# Patient Record
Sex: MALE | Race: WHITE | NOT HISPANIC OR LATINO | Employment: OTHER | URBAN - METROPOLITAN AREA
[De-identification: names, ages, dates, MRNs, and addresses within clinical notes are randomized per-mention and may not be internally consistent; named-entity substitution may affect disease eponyms.]

---

## 2017-01-02 ENCOUNTER — TELEPHONE (OUTPATIENT)
Dept: FAMILY MEDICINE | Facility: CLINIC | Age: 82
End: 2017-01-02

## 2017-01-02 NOTE — TELEPHONE ENCOUNTER
Fax from [facility][id number] requesting     Furosemide 20mg    See TE 11/8/16 - patient was told by dermatology to stop furosemide due to rash.  Medication now on allergy list.    Needs triage - did patient start taking medication again?  Or was this auto-fax from pharmacy?  Perhaps pharmacy needs to be notified that medication was discontinued?    LOV 11-25-16 Dena, 10-7-16 Mely    BP Readings from Last 6 Encounters:   11/25/16 122/66   11/22/16 116/70   10/07/16 159/73   10/05/16 138/70   09/28/16 130/62   09/02/16 128/74       Tran Cortes RT (R)

## 2017-01-02 NOTE — TELEPHONE ENCOUNTER
Called patient's [facility] .    Confirmed RX Furosemide was an automated refill request.    Informed pharmacy that the med has been DC'd and to add to patient's Allergy list.     Evie Mckeon RN, BSN

## 2017-01-04 ENCOUNTER — ANTICOAGULATION THERAPY VISIT (OUTPATIENT)
Dept: NURSING | Facility: CLINIC | Age: 82
End: 2017-01-04
Payer: COMMERCIAL

## 2017-01-04 ENCOUNTER — OFFICE VISIT (OUTPATIENT)
Dept: FAMILY MEDICINE | Facility: CLINIC | Age: 82
End: 2017-01-04
Payer: COMMERCIAL

## 2017-01-04 VITALS
WEIGHT: 182 LBS | HEIGHT: 70 IN | OXYGEN SATURATION: 95 % | SYSTOLIC BLOOD PRESSURE: 123 MMHG | BODY MASS INDEX: 26.05 KG/M2 | TEMPERATURE: 97.4 F | DIASTOLIC BLOOD PRESSURE: 70 MMHG | HEART RATE: 54 BPM

## 2017-01-04 DIAGNOSIS — D12.6 BENIGN NEOPLASM OF COLON, UNSPECIFIED PART OF COLON: ICD-10-CM

## 2017-01-04 DIAGNOSIS — Z79.01 LONG-TERM (CURRENT) USE OF ANTICOAGULANTS: Primary | ICD-10-CM

## 2017-01-04 DIAGNOSIS — I26.99 PULMONARY EMBOLISM, BILATERAL (H): ICD-10-CM

## 2017-01-04 DIAGNOSIS — I48.0 PAROXYSMAL ATRIAL FIBRILLATION (H): ICD-10-CM

## 2017-01-04 DIAGNOSIS — E87.79 OTHER HYPERVOLEMIA: ICD-10-CM

## 2017-01-04 DIAGNOSIS — D62 ANEMIA DUE TO BLOOD LOSS, ACUTE: Primary | ICD-10-CM

## 2017-01-04 DIAGNOSIS — R60.0 BILATERAL EDEMA OF LOWER EXTREMITY: ICD-10-CM

## 2017-01-04 LAB
ERYTHROCYTE [DISTWIDTH] IN BLOOD BY AUTOMATED COUNT: 14.5 % (ref 10–15)
HCT VFR BLD AUTO: 42 % (ref 40–53)
HGB BLD-MCNC: 13.4 G/DL (ref 13.3–17.7)
INR POINT OF CARE: 1.6 (ref 0.86–1.14)
MCH RBC QN AUTO: 28.1 PG (ref 26.5–33)
MCHC RBC AUTO-ENTMCNC: 31.9 G/DL (ref 31.5–36.5)
MCV RBC AUTO: 88 FL (ref 78–100)
PLATELET # BLD AUTO: 205 10E9/L (ref 150–450)
RBC # BLD AUTO: 4.77 10E12/L (ref 4.4–5.9)
WBC # BLD AUTO: 9 10E9/L (ref 4–11)

## 2017-01-04 PROCEDURE — 99207 ZZC NO CHARGE NURSE ONLY: CPT

## 2017-01-04 PROCEDURE — 36416 COLLJ CAPILLARY BLOOD SPEC: CPT

## 2017-01-04 PROCEDURE — 85610 PROTHROMBIN TIME: CPT | Mod: QW

## 2017-01-04 PROCEDURE — 85027 COMPLETE CBC AUTOMATED: CPT | Performed by: INTERNAL MEDICINE

## 2017-01-04 PROCEDURE — 99214 OFFICE O/P EST MOD 30 MIN: CPT | Performed by: INTERNAL MEDICINE

## 2017-01-04 PROCEDURE — 80048 BASIC METABOLIC PNL TOTAL CA: CPT | Performed by: INTERNAL MEDICINE

## 2017-01-04 PROCEDURE — 82728 ASSAY OF FERRITIN: CPT | Performed by: INTERNAL MEDICINE

## 2017-01-04 RX ORDER — BUMETANIDE 0.5 MG/1
0.5 TABLET ORAL DAILY PRN
Qty: 20 TABLET | Refills: 3 | Status: SHIPPED | OUTPATIENT
Start: 2017-01-04 | End: 2017-01-17

## 2017-01-04 RX ORDER — FERROUS SULFATE 325(65) MG
325 TABLET ORAL DAILY
Qty: 90 TABLET | Refills: 1 | Status: CANCELLED | OUTPATIENT
Start: 2017-01-04

## 2017-01-04 NOTE — PROGRESS NOTES
"[facility]  CLINIC PROGRESS NOTE    Subjective:  Skin Rash   Hermilo Velasquez has continue to use steroid cream to help with the rash.  He is not clear if there is a medication reaction, but suspects possibly iron tablets could be causing this.  He previously had taken lasix but that seemed to trigger a larger rash.  Anemia due to blood loss, acute   His last hemoglobin was a bit low, no bleeding identified.  He is taking warfarin.  Bilateral edema of lower extremity   He has worsening swelling at times.  He has cut down his sodium very low.  He is not taking any lasix currently.     Past medical history, medications, allergies, social history, family history reviewed and updated in [facility] as of 1/4/2017 .    ROS  CONSTITUTIONAL: no fatigue, no unexpected change in weight  SKIN: as above   EYES: no acute vision problems or changes  ENT: no ear problems, no mouth problems, no throat problems  RESP: no significant cough, no shortness of breath  CV: no chest pain, no palpitations, no new or worsening peripheral edema  GI: no nausea, no vomiting, no constipation, no diarrhea  : no frequency, no dysuria, no hematuria  MS: knee pain bilaterally  PSYCHIATRIC: no changes in mood or affect      Objective:  Vitals  /70 mmHg  Pulse 54  Temp(Src) 97.4  F (36.3  C) (Oral)  Ht 5' 10\" (1.778 m)  Wt 182 lb (82.555 kg)  BMI 26.11 kg/m2  SpO2 95%  GEN: Alert Oriented x3 NAD  HEENT: Atraumatic, normocephalic, neck supple, no thyromegaly, negative cervical adenopathy  TM: TM bilaterally pearly and grey with normal light reflex  CV: RRR 1/6 systolic murmur  PULM: CTA no wheezes or crackles  ABD: Soft, nontender nondistended, no hepatosplenomegally  SKIN: erythematous rash on upper back  EXT: 1+ edema bilateral lower extremities  NEURO: Gait and station deferred, No focal neurologic deficits  PSYCH: Mood good, affect mood congruent    Results for orders placed or performed in visit on 01/04/17 (from the past 24 " hour(s))   CBC with platelets   Result Value Ref Range    WBC 9.0 4.0 - 11.0 10e9/L    RBC Count 4.77 4.4 - 5.9 10e12/L    Hemoglobin 13.4 13.3 - 17.7 g/dL    Hematocrit 42.0 40.0 - 53.0 %    MCV 88 78 - 100 fl    MCH 28.1 26.5 - 33.0 pg    MCHC 31.9 31.5 - 36.5 g/dL    RDW 14.5 10.0 - 15.0 %    Platelet Count 205 150 - 450 10e9/L       Assessment/Plan:  Patient Instructions   (D62) Anemia due to blood loss, acute  (primary encounter diagnosis)  Comment: We will check complete blood counts today and check iron levels  Plan: CBC with platelets, Ferritin, GASTROENTEROLOGY         ADULT REFERRAL +/- PROCEDURE            (R60.0) Bilateral edema of lower extremity  Comment: check basic metabolic panel today   Plan: Basic metabolic panel            (E87.79) Other hypervolemia  Comment: we will check labs today and start a new medication of bumex - monitor for the onset of a rash as this is closely related to lasix  Plan: bumetanide (BUMEX) 0.5 MG tablet, Basic         metabolic panel            (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 As a result, there may be errors in the script that have gone undetected. Please consider this when interpreting information found in this chart.    Bucky Boone MD  [phone]

## 2017-01-04 NOTE — PATIENT INSTRUCTIONS
(D62) Anemia due to blood loss, acute  (primary encounter diagnosis)  Comment: We will check complete blood counts today and check iron levels  Plan: CBC with platelets, Ferritin, GASTROENTEROLOGY         ADULT REFERRAL +/- PROCEDURE            (R60.0) Bilateral edema of lower extremity  Comment: check basic metabolic panel today   Plan: Basic metabolic panel            (E87.79) Other hypervolemia  Comment: we will check labs today and start a new medication of bumex - monitor for the onset of a rash as this is closely related to lasix  Plan: bumetanide (BUMEX) 0.5 MG tablet, Basic         metabolic panel            (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

## 2017-01-04 NOTE — PROGRESS NOTES
ANTICOAGULATION FOLLOW-UP CLINIC VISIT    Patient Name:  Hermilo Velasquez  Date:  1/4/2017  Contact Type:  Face to Face    SUBJECTIVE:     Patient Findings     Positives Intentional hold of therapy    Comments Had hip injected so held Warfarin X 3 days.           OBJECTIVE    INR PROTIME   Date Value Ref Range Status   01/04/2017 1.6* 0.86 - 1.14 Final       ASSESSMENT / PLAN  INR assessment SUB    Recheck INR In: 2 WEEKS    INR Location Clinic      Anticoagulation Summary as of 1/4/2017     INR goal 2.0-3.0   Selected INR 1.6! (1/4/2017)   Maintenance plan 7.5 mg (5 mg x 1.5) on Mon, Wed, Fri; 5 mg (5 mg x 1) all other days   Full instructions 1/4: 7.5 mg; Otherwise 7.5 mg on Mon, Wed, Fri; 5 mg all other days   Weekly total 42.5 mg   Weekly max dose 45 mg   Plan last modified Mireya Otto RN (1/4/2017)   Next INR check 1/18/2017   Priority INR   Target end date Indefinite    Indications   Long-term (current) use of anticoagulants [Z79.01] [Z79.01]  Pulmonary embolism  bilateral (H) [I26.99]  Paroxysmal atrial fibrillation (H) [I48.0]         Anticoagulation Episode Summary     INR check location [facility]    Preferred lab     Send INR reminders to [facility] ANTICOAGULATION    Comments       Anticoagulation Care Providers     Provider Role Specialty Phone number    StephanieramoneBucky douglas MD [facility] Internal Medicine [phone]            See the Encounter Report to view Anticoagulation Flowsheet and Dosing Calendar (Go to Encounters tab in chart review, and find the Anticoagulation Therapy Visit)    Had hip injected so held Warfarin X 3 days.    Dosage adjustment made based on physician directed care plan.    Mireya Otto RN

## 2017-01-04 NOTE — NURSING NOTE
"Chief Complaint   Patient presents with     Derm Problem     Recheck Medication       Initial /89 mmHg  Pulse 54  Temp(Src) 97.4  F (36.3  C) (Oral)  Ht 5' 10\" (1.778 m)  Wt 182 lb (82.555 kg)  BMI 26.11 kg/m2  SpO2 95% Estimated body mass index is 26.11 kg/(m^2) as calculated from the following:    Height as of this encounter: 5' 10\" (1.778 m).    Weight as of this encounter: 182 lb (82.555 kg).  BP completed using cuff size: regular right arm  Jessica Bluffton- CMA      "

## 2017-01-04 NOTE — MR AVS SNAPSHOT
Hermilo MADRID Eva   1/4/2017 11:15 AM   Anticoagulation Therapy Visit    Description:  84 year old male   Provider:   ANTICOAGULATION CLINIC   Department:   Nurse           INR as of 1/4/2017     Selected INR 1.6! (1/4/2017)      Anticoagulation Summary as of 1/4/2017     INR goal 2.0-3.0   Selected INR 1.6! (1/4/2017)   Full instructions 1/4: 7.5 mg; Otherwise 7.5 mg on Mon, Wed, Fri; 5 mg all other days   Next INR check 1/18/2017    Indications   Long-term (current) use of anticoagulants [Z79.01] [Z79.01]  Pulmonary embolism  bilateral (H) [I26.99]  Paroxysmal atrial fibrillation (H) [I48.0]         Your next Anticoagulation Clinic appointment(s)     Jan 18, 2017 11:15 AM   Anticoagulation Visit with  ANTICOAGULATION CLINIC   [facility] [place] ([facility])    [address]  [place] [address]   [phone]              Contact Numbers     Clinic Number:         January 2017 Details    Sun Mon Tue Wed Thu Fri Sat     1               2               3               4      7.5 mg   See details      5      5 mg         6      7.5 mg         7      5 mg           8      5 mg         9      7.5 mg         10      5 mg         11      7.5 mg         12      5 mg         13      7.5 mg         14      5 mg           15      5 mg         16      7.5 mg         17      5 mg         18            19               20               21                 22               23               24               25               26               27               28                 29               30               31                    Date Details   01/04 This INR check       Date of next INR:  1/18/2017         How to take your warfarin dose     To take:  5 mg Take 1 of the 5 mg tablets.    To take:  7.5 mg Take 1.5 of the 5 mg tablets.

## 2017-01-04 NOTE — MR AVS SNAPSHOT
After Visit Summary   1/4/2017    Hermilo Velasquez    MRN: [id number]           Patient Information     Date Of Birth          11/3/1932        Visit Information        Provider Department      1/4/2017 12:00 PM Bucky Boone MD [facility]        Today's Diagnoses     Anemia due to blood loss, acute    -  1     Bilateral edema of lower extremity         Other hypervolemia         Benign neoplasm of colon, unspecified part of colon           Care Instructions    (D62) Anemia due to blood loss, acute  (primary encounter diagnosis)  Comment: We will check complete blood counts today and check iron levels  Plan: CBC with platelets, Ferritin, GASTROENTEROLOGY         ADULT REFERRAL +/- PROCEDURE            (R60.0) Bilateral edema of lower extremity  Comment: check basic metabolic panel today   Plan: Basic metabolic panel            (E87.79) Other hypervolemia  Comment: we will check labs today and start a new medication of bumex - monitor for the onset of a rash as this is closely related to lasix  Plan: bumetanide (BUMEX) 0.5 MG tablet, Basic         metabolic panel            (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    English    Procedure/Referral: PROCEDURE ONLY - COLONOSCOPY - Reason for procedure: Iron Deficiency Anemia  FHN: [facility] (for [place] and [facility], please use the selections above) - [place] [phone]   [email or web address]      Please be aware that coverage of these services is subject to the terms and limitations of your health insurance plan.  Call member services at your health plan with any benefit or coverage questions.  Any procedures must be performed at a [facility]  "facility OR coordinated by your clinic's referral office.    Please bring the following with you to your appointment:    (1) Any X-Rays, CTs or MRIs which have been performed.  Contact the facility where they were done to arrange for  prior to your scheduled appointment.    (2) List of current medications   (3) This referral request   (4) Any documents/labs given to you for this referral                  Your next 10 appointments already scheduled     Jan 18, 2017 11:15 AM   An[facility]    [address] [facility] [id number] [phone]            Feb 13, 2017 10:45 AM   Return Visit with Mario Kelly MD   [facility] PHYSICIANS [facility] [place] ([facility])    [id number]  [facility] [id number] [phone]              Who to contact     If you have questions or need follow up information about today's clinic visit or your schedule please contact [facility] directly at [phone].  Normal or non-critical lab and imaging results will be communicated to you by StoreAgehart, letter or phone within 4 business days after the clinic has received the results. If you do not hear from us within 7 days, please contact the clinic through StoreAgehart or phone. If you have a critical or abnormal lab result, we will notify you by phone as soon as possible.  Submit refill requests through PassKit or call your pharmacy and they will forward the refill request to us. Please allow 3 business days for your refill to be completed.          Additional Information About Your Visit        MyChart Information     PassKit lets you send messages to your doctor, view your test results, renew your prescriptions, schedule appointments and more. To sign up, go to [email or web address] . Click on \"Log in\" on the left side of the screen, which will take you to the Welcome page. Then click on " "\"Sign up Now\" on the right side of the page.     You will be asked to enter the access code listed below, as well as some personal information. Please follow the directions to create your username and password.     Your access code is: [email or web address]  Expires: 2017 12:51 PM     Your access code will  in 90 days. If you need help or a new code, please call your [place] clinic or [phone].        Care EveryWhere ID     This is your Care EveryWhere ID. This could be used by other organizations to access your [place] medical records  [id number]        Your Vitals Were     Pulse Temperature Height BMI (Body Mass Index) Pulse Oximetry       54 97.4  F (36.3  C) (Oral) 5' 10\" (1.778 m) 26.11 kg/m2 95%        Blood Pressure from Last 3 Encounters:   17 123/70   16 122/66   16 116/70    Weight from Last 3 Encounters:   17 182 lb (82.555 kg)   16 179 lb (81.194 kg)   16 183 lb (83.008 kg)              We Performed the Following     Basic metabolic panel     CBC with platelets     Ferritin     GASTROENTEROLOGY ADULT REFERRAL +/- PROCEDURE          Today's Medication Changes          These changes are accurate as of: 17 12:51 PM.  If you have any questions, ask your nurse or doctor.               Start taking these medicines.        Dose/Directions    bumetanide 0.5 MG tablet   Commonly known as:  BUMEX   Used for:  Other hypervolemia   Started by:  Bucky Boone MD        Dose:  0.5 mg   Take 1 tablet (0.5 mg) by mouth daily as needed If lower extremity swelling   Quantity:  20 tablet   Refills:  3         Stop taking these medicines if you haven't already. Please contact your care team if you have questions.     ferrous sulfate 325 (65 FE) MG tablet   Commonly known as:  IRON   Stopped by:  Bucky Boone MD                Where to get your medicines      These medications were sent to [id number] - [facility] [address] [facility]" [address] [facility] [address]     Phone:  [phone]    - bumetanide 0.5 MG tablet             Primary Care Provider Office Phone # Fax #    Bucky Boone [phone]       [facility] [address]  [facility] [address]        Thank you!     Thank you for choosing [facility]  for your care. Our goal is always to provide you with excellent care. Hearing back from our patients is one way we can continue to improve our services. Please take a few minutes to complete the written survey that you may receive in the mail after your visit with us. Thank you!             Your Updated Medication List - Protect others around you: Learn how to safely use, store and throw away your medicines at www.disposemymeds.org.          This list is accurate as of: 1/4/17 12:51 PM.  Always use your most recent med list.                   Brand Name Dispense Instructions for use    atorvastatin 10 MG tablet    LIPITOR    90 tablet    Take 1 tablet (10 mg) by mouth daily       bumetanide 0.5 MG tablet    BUMEX    20 tablet    Take 1 tablet (0.5 mg) by mouth daily as needed If lower extremity swelling       cetirizine 10 MG tablet    zyrTEC    30 tablet    Take 1 tablet (10 mg) by mouth every evening       fluticasone 50 MCG/ACT spray    FLONASE     Spray 1 spray into both nostrils daily       metoprolol 100 MG 24 hr tablet    TOPROL-XL    180 tablet    Take 1 tablet (100 mg) by mouth daily       OMEPRAZOLE PO      Take 20 mg by mouth daily       predniSONE 20 MG tablet    DELTASONE    5 tablet    Take 1 tablet (20 mg) by mouth daily       sodium chloride 0.65 % nasal spray    OCEAN     Spray 1 spray into both nostrils daily       warfarin 5 MG tablet    COUMADIN    100 tablet    Take 1-1.5 tablets (5-7.5 mg) by mouth See Admin Instructions Take as prescribed by INR Clinic

## 2017-01-05 LAB
ANION GAP SERPL CALCULATED.3IONS-SCNC: 8 MMOL/L (ref 3–14)
BUN SERPL-MCNC: 14 MG/DL (ref 7–30)
CALCIUM SERPL-MCNC: 8.9 MG/DL (ref 8.5–10.1)
CHLORIDE SERPL-SCNC: 105 MMOL/L (ref 94–109)
CO2 SERPL-SCNC: 31 MMOL/L (ref 20–32)
CREAT SERPL-MCNC: 0.81 MG/DL (ref 0.66–1.25)
FERRITIN SERPL-MCNC: 59 NG/ML (ref 26–388)
GFR SERPL CREATININE-BSD FRML MDRD: NORMAL ML/MIN/1.7M2
GLUCOSE SERPL-MCNC: 95 MG/DL (ref 70–99)
POTASSIUM SERPL-SCNC: 4.5 MMOL/L (ref 3.4–5.3)
SODIUM SERPL-SCNC: 144 MMOL/L (ref 133–144)

## 2017-01-16 ENCOUNTER — TELEPHONE (OUTPATIENT)
Dept: FAMILY MEDICINE | Facility: CLINIC | Age: 82
End: 2017-01-16

## 2017-01-16 DIAGNOSIS — L29.9 CHRONIC PRURITUS: Primary | ICD-10-CM

## 2017-01-16 RX ORDER — HYDROXYZINE HYDROCHLORIDE 25 MG/1
25-50 TABLET, FILM COATED ORAL EVERY 6 HOURS PRN
Qty: 120 TABLET | Refills: 3 | Status: SHIPPED | OUTPATIENT
Start: 2017-01-16 | End: 2017-06-06

## 2017-01-17 ENCOUNTER — TELEPHONE (OUTPATIENT)
Dept: FAMILY MEDICINE | Facility: CLINIC | Age: 82
End: 2017-01-17

## 2017-01-17 ENCOUNTER — ANTICOAGULATION THERAPY VISIT (OUTPATIENT)
Dept: NURSING | Facility: CLINIC | Age: 82
End: 2017-01-17
Payer: COMMERCIAL

## 2017-01-17 ENCOUNTER — OFFICE VISIT (OUTPATIENT)
Dept: FAMILY MEDICINE | Facility: CLINIC | Age: 82
End: 2017-01-17
Payer: COMMERCIAL

## 2017-01-17 VITALS
DIASTOLIC BLOOD PRESSURE: 73 MMHG | OXYGEN SATURATION: 93 % | SYSTOLIC BLOOD PRESSURE: 136 MMHG | HEIGHT: 70 IN | HEART RATE: 67 BPM | BODY MASS INDEX: 26.48 KG/M2 | WEIGHT: 185 LBS | TEMPERATURE: 97.2 F

## 2017-01-17 DIAGNOSIS — I48.0 PAROXYSMAL ATRIAL FIBRILLATION (H): ICD-10-CM

## 2017-01-17 DIAGNOSIS — L24.0 CONTACT DERMATITIS AND ECZEMA DUE TO DETERGENTS: ICD-10-CM

## 2017-01-17 DIAGNOSIS — Z79.01 LONG-TERM (CURRENT) USE OF ANTICOAGULANTS: Primary | ICD-10-CM

## 2017-01-17 DIAGNOSIS — I26.99 PULMONARY EMBOLISM, BILATERAL (H): ICD-10-CM

## 2017-01-17 DIAGNOSIS — K21.9 GASTROESOPHAGEAL REFLUX DISEASE WITHOUT ESOPHAGITIS: ICD-10-CM

## 2017-01-17 DIAGNOSIS — R21 RASH AND NONSPECIFIC SKIN ERUPTION: Primary | ICD-10-CM

## 2017-01-17 LAB — INR BLD: 2.2 (ref 0.86–1.14)

## 2017-01-17 PROCEDURE — 36416 COLLJ CAPILLARY BLOOD SPEC: CPT | Performed by: INTERNAL MEDICINE

## 2017-01-17 PROCEDURE — 85610 PROTHROMBIN TIME: CPT | Mod: QW | Performed by: INTERNAL MEDICINE

## 2017-01-17 PROCEDURE — 99213 OFFICE O/P EST LOW 20 MIN: CPT | Performed by: INTERNAL MEDICINE

## 2017-01-17 PROCEDURE — 99207 ZZC NO CHARGE NURSE ONLY: CPT | Performed by: INTERNAL MEDICINE

## 2017-01-17 RX ORDER — CETIRIZINE HYDROCHLORIDE 10 MG/1
10 TABLET ORAL EVERY EVENING
Qty: 30 TABLET | Refills: 11 | Status: SHIPPED | OUTPATIENT
Start: 2017-01-17 | End: 2017-06-06

## 2017-01-17 RX ORDER — TRIAMCINOLONE ACETONIDE 1 MG/G
CREAM TOPICAL 2 TIMES DAILY
COMMUNITY
End: 2018-01-10

## 2017-01-17 NOTE — TELEPHONE ENCOUNTER
Attempted to reach patient. He is not home at the moment.  Spoke with wife Roberta.  I received a copy of patient's INR result that was drawn in lab today 1/17/17.  INR was 2.2 from lab draw after seeing Dr. Boone.  I started Anticoagulation Encounter for INR RN to finish once patient calls back to discuss dosing instructions  Patient is scheduled to come back in tomorrow for INR Clinic. Since INR was drawn today; this appt can be cancelled when patient calls back.     When patient calls back, INR RN please complete the Anticoagulation Encounter that was started today 1/17/17.   Then cancel the patient's scheduled INR appt for tomorrow since it is not needed.     Alisha Meza RN

## 2017-01-17 NOTE — MR AVS SNAPSHOT
After Visit Summary   1/17/2017    Hermilo Velasquez    MRN: [id number]           Patient Information     Date Of Birth          11/3/1932        Visit Information        Provider Department      1/17/2017 2:30 PM Bucky Boone MD [facility] [place]        Today's Diagnoses     Rash and nonspecific skin eruption    -  1     Gastroesophageal reflux disease without esophagitis           Care Instructions    (R21) Rash and nonspecific skin eruption  (primary encounter diagnosis)  Comment: We will continue to avoid diuretic.  We can hold omeprazole and substitute with zantac ( over the counter).  I would recommend follow up in dermatology.  Refill for hydroxyzine given.  Plan: SKIN CARE REFERRAL                    Follow-ups after your visit        Additional Services     SKIN CARE REFERRAL       Your provider has referred you to: [facility]: [facility] [place] [phone]   [email or web address]     Please be aware that coverage of these services is subject to the terms and limitations of your health insurance plan.  Please check with your insurance prior to the appointment to ensure appropriate coverage for any services considered cosmetic in nature or not medically necessary.    Please bring the following with you to your appointment:    (1) Any X-Rays, CTs or MRIs which have been performed.  Contact the facility where they were done to arrange for  prior to your scheduled appointment.  Any new CT, MRI or other procedures ordered by your specialist must be performed at a [facility] facility or coordinated by your clinic's referral office.  (2) List of current medications  (3) This referral request   (4) Any documents/labs given to you for this referral                  Your next 10 appointments already scheduled     Jan 18, 2017 11:15 AM   Anticoagulation Visit with  ANTICOAGULATION CLINIC   [facility] [place] ([place]  [facility])    [address]  [place] [id number] [phone]            2017 10:45 AM   Return Visit with Mario Kelly MD   [facility] [place] ([facility])    [address]  [place] [id number] [phone]              Who to contact     If you have questions or need follow up information about today's clinic visit or your schedule please contact [facility] directly at [phone].  Normal or non-critical lab and imaging results will be communicated to you by M-Fileshart, letter or phone within 4 business days after the clinic has received the results. If you do not hear from us within 7 days, please contact the clinic through M-Fileshart or phone. If you have a critical or abnormal lab result, we will notify you by phone as soon as possible.  Submit refill requests through Picocent or call your pharmacy and they will forward the refill request to us. Please allow 3 business days for your refill to be completed.          Additional Information About Your Visit        MyChart Information     Picocent lets you send messages to your doctor, view your test results, renew your prescriptions, schedule appointments and more. To sign up, go to [email or web address] . Click on \"Log in\" on the left side of the screen, which will take you to the Welcome page. Then click on \"Sign up Now\" on the right side of the page.     You will be asked to enter the access code listed below, as well as some personal information. Please follow the directions to create your username and password.     Your access code is: [email or web address]  Expires: 2017 12:51 PM     Your access code will  in 90 days. If you need help or a new code, please call your [facility] or [phone].        Care EveryWhere ID     This is your Care EveryWhere ID. This could be used by other organizations to access your [facility] medical records  [id number]        Your " "Vitals Were     Pulse Temperature Height BMI (Body Mass Index) Pulse Oximetry       67 97.2  F (36.2  C) 5' 10\" (1.778 m) 26.54 kg/m2 93%        Blood Pressure from Last 3 Encounters:   01/17/17 136/73   01/04/17 123/70   11/25/16 122/66    Weight from Last 3 Encounters:   01/17/17 185 lb (83.915 kg)   01/04/17 182 lb (82.555 kg)   11/25/16 179 lb (81.194 kg)              We Performed the Following     SKIN CARE REFERRAL          Today's Medication Changes          These changes are accurate as of: 1/17/17  3:08 PM.  If you have any questions, ask your nurse or doctor.               Start taking these medicines.        Dose/Directions    ranitidine 150 MG tablet   Commonly known as:  ZANTAC   Used for:  Gastroesophageal reflux disease without esophagitis   Started by:  Bucky Boone MD        Dose:  150 mg   Take 1 tablet (150 mg) by mouth 2 times daily   Quantity:  60 tablet   Refills:  3         Stop taking these medicines if you haven't already. Please contact your care team if you have questions.     bumetanide 0.5 MG tablet   Commonly known as:  BUMEX   Stopped by:  Bucky Boone MD           OMEPRAZOLE PO   Stopped by:  Bucky Boone MD                Where to get your medicines      These medications were sent to [id number] - [place], [place] - [address]  [address] [address]     Phone:  [phone]    - ranitidine 150 MG tablet             Primary Care Provider Office Phone # Fax #    Bucky Boone [phone]       [facility] [address]  [facility] [address]        Thank you!     Thank you for choosing [facility]  for your care. Our goal is always to provide you with excellent care. Hearing back from our patients is one way we can continue to improve our services. Please take a few minutes to complete the written survey that you may receive in the mail after your visit " with us. Thank you!             Your Updated Medication List - Protect others around you: Learn how to safely use, store and throw away your medicines at www.disposemymeds.org.          This list is accurate as of: 1/17/17  3:08 PM.  Always use your most recent med list.                   Brand Name Dispense Instructions for use    atorvastatin 10 MG tablet    LIPITOR    90 tablet    Take 1 tablet (10 mg) by mouth daily       cetirizine 10 MG tablet    zyrTEC    30 tablet    Take 1 tablet (10 mg) by mouth every evening       fluticasone 50 MCG/ACT spray    FLONASE     Spray 1 spray into both nostrils daily       hydrOXYzine 25 MG tablet    ATARAX    120 tablet    Take 1-2 tablets (25-50 mg) by mouth every 6 hours as needed for itching       metoprolol 100 MG 24 hr tablet    TOPROL-XL    180 tablet    Take 1 tablet (100 mg) by mouth daily       ranitidine 150 MG tablet    ZANTAC    60 tablet    Take 1 tablet (150 mg) by mouth 2 times daily       sodium chloride 0.65 % nasal spray    OCEAN     Spray 1 spray into both nostrils daily       warfarin 5 MG tablet    COUMADIN    100 tablet    Take 1-1.5 tablets (5-7.5 mg) by mouth See Admin Instructions Take as prescribed by INR Clinic

## 2017-01-17 NOTE — PATIENT INSTRUCTIONS
(R21) Rash and nonspecific skin eruption  (primary encounter diagnosis)  Comment: We will continue to avoid diuretic.  We can hold omeprazole and substitute with zantac ( over the counter).  I would recommend follow up in dermatology.  Refill for hydroxyzine given.  Plan: SKIN CARE REFERRAL

## 2017-01-17 NOTE — NURSING NOTE
"Chief Complaint   Patient presents with     Derm Problem     on right side of trunk       Initial /73 mmHg  Pulse 67  Temp(Src) 97.2  F (36.2  C)  Ht 5' 10\" (1.778 m)  Wt 185 lb (83.915 kg)  BMI 26.54 kg/m2  SpO2 93% Estimated body mass index is 26.54 kg/(m^2) as calculated from the following:    Height as of this encounter: 5' 10\" (1.778 m).    Weight as of this encounter: 185 lb (83.915 kg).  BP completed using cuff size: haylee BUI CMA      "

## 2017-01-18 NOTE — PROGRESS NOTES
"  ANTICOAGULATION FOLLOW-UP CLINIC VISIT    Patient Name:  Hermilo Velasquez  Date:  1/18/2017  Contact Type:  Telephone    SUBJECTIVE:     Patient Findings     Positives No Problem Findings           OBJECTIVE    INR POINT OF CARE   Date Value Ref Range Status   01/17/2017 2.2* 0.86 - 1.14 Final     Comment:     This test is intended for monitoring Coumadin therapy.  Results are not   accurate   in patients with prolonged INR due to factor deficiency.         ASSESSMENT / PLAN  INR assessment THER    Recheck INR In: 4 WEEKS    INR Location Clinic      Anticoagulation Summary as of 1/17/2017     INR goal 2.0-3.0   Selected INR 2.2 (1/17/2017)   Maintenance plan 7.5 mg (5 mg x 1.5) on Mon, Wed, Fri; 5 mg (5 mg x 1) all other days   Full instructions 7.5 mg on Mon, Wed, Fri; 5 mg all other days   Weekly total 42.5 mg   Weekly max dose 45 mg   No change documented Alisha Meza RN   Plan last modified Mireya Otto RN (1/4/2017)   Next INR check 2/15/2017   Priority INR   Target end date Indefinite    Indications   Long-term (current) use of anticoagulants [Z79.01] [Z79.01]  Pulmonary embolism  bilateral (H) [I26.99]  Paroxysmal atrial fibrillation (H) [I48.0]         Anticoagulation Episode Summary     INR check location [facility]    Preferred lab     Send INR reminders to CS ANTICOAGULATION    Comments       Anticoagulation Care Providers     Provider Role Specialty Phone number    Mely Bucky Munoz MD Responsible Internal Medicine [phone]            See the Encounter Report to view Anticoagulation Flowsheet and Dosing Calendar (Go to Encounters tab in chart review, and find the Anticoagulation Therapy Visit)    Dosage adjustment made based on physician directed care plan. Reached patient.  States he is \"back on track\".  INR was done at office visit yesterday. Continue same dosing and return in 3 weeks. (He insists on 4 week check due to conflicting schedule)    Analisa Vigil RN                 "

## 2017-01-20 NOTE — PROGRESS NOTES
"[facility]  CLINIC PROGRESS NOTE    Subjective:   Rash and nonspecific skin eruption   Hermilo Velasquez presents to the clinic for evaluation of a recent rash. He has had this rash ongoing for approximately 6-12 months. It was initially thought that the rash may be secondary to diuretic and his thiazide diuretic as well as his furosemide have been discontinued he was prescribed a course of Bumex which she has not been taking as he has not had any additional swelling. His rash has continued and he has seen dermatology in the past and was prescribed a topical steroid medication for this. He intends to consider follow-up for a biopsy of this rashes and has not regressed despite his treatment course.  Paroxysmal atrial fibrillation (H)   He has not had any issues with this condition and continues on anticoagulation without any bleeding concerns.      Past medical history, medications, allergies, social history, family history reviewed and updated in [facility] as of 1/20/2017 .    ROS  CONSTITUTIONAL: no fatigue, no unexpected change in weight  SKIN: as above   EYES: no acute vision problems or changes  ENT: no ear problems, no mouth problems, no throat problems  RESP: no significant cough, no shortness of breath  CV:  no new or worsening peripheral edema  GI: no nausea, no vomiting, no constipation, no diarrhea  : no frequency, no dysuria, no hematuria  MS: no claudication, no myalgias, no joint aches  PSYCHIATRIC: no changes in mood or affect      Objective:  Vitals  /73 mmHg  Pulse 67  Temp(Src) 97.2  F (36.2  C)  Ht 5' 10\" (1.778 m)  Wt 185 lb (83.915 kg)  BMI 26.54 kg/m2  SpO2 93%  GEN: Alert Oriented x3 NAD  HEENT: Atraumatic, normocephalic, neck supple, no thyromegaly, negative cervical adenopathy  TM: TM bilaterally pearly and grey with normal light reflex  CV: RRR no murmurs or rubs  PULM: CTA no wheezes or crackles  ABD: Soft, nontender nondistended, no hepatosplenomegally  SKIN: No " visible skin lesion or ulcerations  EXT: trace edema bilateral lower extremities  NEURO: Gait and station deferred, No focal neurologic deficits  PSYCH: Mood good, affect mood congruent    No results found for this or any previous visit (from the past 24 hour(s)).    Assessment/Plan:  Patient Instructions   (R21) Rash and nonspecific skin eruption  (primary encounter diagnosis)  Comment: We will continue to avoid diuretic.  We can hold omeprazole and substitute with zantac ( over the counter).  I would recommend follow up in dermatology.  Refill for hydroxyzine given.  Plan: SKIN CARE REFERRAL                15 minutes spent with patient.  Over 50% of time counseling   Follow up in dermatology    Disclaimer: This note consists of symbols derived from keyboarding, dictation and/or voice recognition software. As a result, there may be errors in the script that have gone undetected. Please consider this when interpreting information found in this chart.    Bucky Boone MD  [phone]

## 2017-01-26 ENCOUNTER — TELEPHONE (OUTPATIENT)
Dept: FAMILY MEDICINE | Facility: CLINIC | Age: 82
End: 2017-01-26

## 2017-01-26 NOTE — TELEPHONE ENCOUNTER
Reason for Call:  Medication or medication refill:    Do you use a [facility]?  Name of the pharmacy and phone number for the current request:    Will need a PA for the medicaiton    Name of the medication requested: hydrOXYzine (ATARAX) 25 MG tablet    Other request: the pharmacy told him that a PA is needed for this medication    Can we leave a detailed message on this number? YES    Phone number patient can be reached at: Home number on file [phone] (home)    Best Time: anytime    Call taken on 1/26/2017 at 2:02 PM by Ember Sloan

## 2017-01-26 NOTE — TELEPHONE ENCOUNTER
KEEP ENCOUNTER OPEN UNTIL PA APPROVED/DENIED FROM INSURANCE  Patient's Insurance Co:   ID #   Phone:   Fax:     Medication and dose requested: Hydroxyzine(atarax) 25mg  Medications patient has tried and failed :       PA request form filed, faxed and sent to [employer] for scanning.   Await approval or denial.      KEEP ENCOUNTER OPEN UNTIL PA APPROVED/DENIED FROM INSURANCE  Patient's Insurance Co: cetirizine (Zyrtec) 10mg  ID #   Phone:   Fax:     Medication and dose requested:   Medications patient has tried and failed :       PA request form filed, faxed and sent to [facility] for [facility].   Await approval or denial.

## 2017-01-30 ENCOUNTER — PRE VISIT (OUTPATIENT)
Dept: CARDIOLOGY | Facility: CLINIC | Age: 82
End: 2017-01-30

## 2017-01-30 DIAGNOSIS — E78.00 PURE HYPERCHOLESTEROLEMIA: Primary | ICD-10-CM

## 2017-01-30 NOTE — Clinical Note
January 30, 2017       TO: Hermilo Velasquez  [address]  [facility] [facility] [address]       Dear Hermilo Velasquez,    We are preparing your chart for your OV 2/13/17 at 10:45 AM with Dr. Kelly. The last lab work for your cholesterol is from 2014. We can try to add a lipid panel to your visit on the same day if you can arrive a hour early; or you can set up another day to just do the fasting lab work.    Please call our clinic at [phone] to schedule your appointment as soon as possible.    If you have any questions, you can call the Team 2 nurse phone at [phone].    Thank you,    Team 2 nurses  [facility]

## 2017-01-30 NOTE — TELEPHONE ENCOUNTER
Patient is taking lipitor, attempted to contact patient to set up lipids with OV 2/13/17. No answer on patient's phone. Mailed a letter to patient's home address requesting that he call to add labs to his visit.

## 2017-02-03 NOTE — TELEPHONE ENCOUNTER
Cetirizine PA not possible, patient will need to pay cash for this. I completed PA for the hydroxyzine and was able to get this approved through [employer] from 1/4/17 - 2/3/18. I called the pharmacy.    Valentin Brian, CMA

## 2017-02-12 ENCOUNTER — HOSPITAL ENCOUNTER (EMERGENCY)
Facility: CLINIC | Age: 82
Discharge: HOME | End: 2017-02-12
Attending: EMERGENCY MEDICINE | Admitting: EMERGENCY MEDICINE
Payer: COMMERCIAL

## 2017-02-12 VITALS
TEMPERATURE: 98.3 F | WEIGHT: 185 LBS | DIASTOLIC BLOOD PRESSURE: 83 MMHG | SYSTOLIC BLOOD PRESSURE: 146 MMHG | BODY MASS INDEX: 26.54 KG/M2 | OXYGEN SATURATION: 94 % | RESPIRATION RATE: 18 BRPM

## 2017-02-12 DIAGNOSIS — M54.6 THORACIC BACK PAIN, UNSPECIFIED BACK PAIN LATERALITY, UNSPECIFIED CHRONICITY: ICD-10-CM

## 2017-02-12 DIAGNOSIS — Z79.01 LONG TERM CURRENT USE OF ANTICOAGULANT THERAPY: ICD-10-CM

## 2017-02-12 DIAGNOSIS — M25.511 BILATERAL SHOULDER PAIN, UNSPECIFIED CHRONICITY: ICD-10-CM

## 2017-02-12 DIAGNOSIS — M25.512 BILATERAL SHOULDER PAIN, UNSPECIFIED CHRONICITY: ICD-10-CM

## 2017-02-12 LAB
INR PPP: 2.76
INR PPP: 2.76 (ref 0.86–1.14)

## 2017-02-12 PROCEDURE — 99283 EMERGENCY DEPT VISIT LOW MDM: CPT

## 2017-02-12 PROCEDURE — 85610 PROTHROMBIN TIME: CPT | Performed by: EMERGENCY MEDICINE

## 2017-02-12 PROCEDURE — 25000132 ZZH RX MED GY IP 250 OP 250 PS 637: Performed by: EMERGENCY MEDICINE

## 2017-02-12 RX ORDER — CYCLOBENZAPRINE HCL 10 MG
10 TABLET ORAL ONCE
Status: COMPLETED | OUTPATIENT
Start: 2017-02-12 | End: 2017-02-12

## 2017-02-12 RX ORDER — CYCLOBENZAPRINE HCL 10 MG
10 TABLET ORAL 3 TIMES DAILY PRN
Qty: 15 TABLET | Refills: 0 | Status: SHIPPED | OUTPATIENT
Start: 2017-02-12 | End: 2017-02-17

## 2017-02-12 RX ORDER — OXYCODONE HYDROCHLORIDE 5 MG/1
5 TABLET ORAL ONCE
Status: COMPLETED | OUTPATIENT
Start: 2017-02-12 | End: 2017-02-12

## 2017-02-12 RX ADMIN — OXYCODONE HYDROCHLORIDE 5 MG: 5 TABLET ORAL at 17:36

## 2017-02-12 RX ADMIN — CYCLOBENZAPRINE HYDROCHLORIDE 10 MG: 10 TABLET, FILM COATED ORAL at 19:51

## 2017-02-12 NOTE — ED PROVIDER NOTES
History     Chief Complaint:  Shoulder pain    HPI   Hermilo Velasquez is a 84 year old male with a history of aortic valve replacement in 2016 on coumadin and left shoulder rotator cuff repair who presents with spouse to the emergency department today for evaluation of shoulder and back pain. Mr. Velasquez reports three days prior he gradually began to experience bilateral upper back pain radiating to bilateral shoulders. No reported falls or injury. Symptoms have gradually worsened and today, patient has limited range of motion prompting visit. Patient took Tylenol with Codeine at 08:00 today with little improvement.  Patient scheduled with cardiologist tomorrow.  He thinks this is a muscular problem.    Lab on 001/04/17:  INR: 1.6 (A)    Allergies:  Lasix  Penicillins    Medications:    Ranitidine (Zantac) 150 Mg Tablet  Cetirizine (Zyrtec) 10 Mg Tablet  Triamcinolone (Kenalog) 0.1 % Cream  Hydroxyzine (Atarax) 25 Mg Tablet  Warfarin (Coumadin) 5 Mg Tablet  Metoprolol (Toprol-Xl) 100 Mg 24 Hr Tablet  Fluticasone (Flonase) 50 Mcg/Act Nasal Spray  Sodium Chloride (Ocean) 0.65 % Nasal Spray  Atorvastatin (Lipitor) 10 Mg Tablet     Past Medical History:    Lower extremity edema   Leg edema, left   S/P total knee arthroplasty   Drainage from wound: left Knee   Rash   Aftercare following left knee joint replacement surgery   Primary osteoarthritis of left knee   Essential hypertension   Non-Hodgkin's lymphoma  Anticoagulated on Coumadin   Other chronic pulmonary embolism   Epistaxis   Status post total left knee   Benign neoplasm of colon   Benign neoplasm of colon, unspecified part of colon   Pulmonary nodules   Long-term (current) use of anticoagulants   S/P IVC filter   Pulmonary embolism, bilateral   Primary osteoarthritis of both knees   Primary osteoarthritis of left hip  Gastrointestinal hemorrhage with melena   Need for SBE (subacute bacterial endocarditis) prophylaxis   RBBB (right bundle branch  block)   Paroxysmal atrial fibrillation (  Anemia due to blood loss, acute   History of colonic polyps   ACP (advance care planning)   Neuropathy   Microscopic hematuria   Non Hodgkin's lymphoma   Nonrheumatic aortic valve stenosis   GERD (gastroesophageal reflux disease)   Ventral hernia   Low back pain     Past Surgical History:    Appendectomy  Tonsillectomy  Knee surgery  Rotator cuff repair   Turp Hernia repair   Spine surgery  Repair ligament ankle  Herniorrhaphy Ventral  As total knee arthroplasty  EGD combined    Family History:    CAD   Emphysema    Social History:  Marital Status:   [2]  Tobacco: Former Smoker  Alcohol: Negative  Presents with spouse.   PCP: Bucky Boone    Review of Systems   Musculoskeletal:        Positive for left shoulder pain.    All other systems reviewed and are negative.    Physical Exam   First Vitals:  BP: 144/71  Heart Rate: 66  Temp: 98.3  F (36.8  C)  Resp: 18  Weight: 83.9 kg (185 lb)  SpO2: 92 %    Physical Exam  General: male sitting upright, wife at bedside  HENT: MMM, no signs of facial trauma  CV:  regular rate, soft compartments in BUE, cap refill normal, normal bilateral radial pulses  Resp: normal effort, clear throughout  GI: abdomen soft,  nontender  MSK:  Spine: no midline tenderness  Moderate reproducible tenderness to bilateral upper back and shoulders with slightly decreased ROM bilateral shoulders. FROM neck.  Good ROM bilat elbows and wrists and fingers.  Skin:   No rash to back, shoulders, or upper chest  Neuro: awake, alert, GCS 15, responds appropriately to commands, good strength in all 4 extremities, ambulatory  Psych: cooperative      Emergency Department Course   Laboratory:  INR: 2.76 (H)    Interventions:  17:36 Oxycodone  5 mg Oral  19:51 Flexeril 10 mg Oral    Emergency Department Course:  Nursing notes and vitals reviewed. I reviewed patient's medical history per EPIC.     17:06 I performed an exam of the patient as documented  above.    17:42 Blood drawn. This was sent to the lab for further testing, results above.    17:36 Oxycodone  5 mg Oral    I looked up this patient's record in the [facility] and found regular Percocet Rx from Dr. You.    19:23 Recheck patient. I personally reviewed the laboratory results with the Patient and spouse and answered all related questions prior to discharge.     19:33 Recheck patient. Plan explained to the Patient and spouse. Patient discharged home with instructions regarding supportive care, medications, and reasons to return. The importance of close follow-up was reviewed.    19:51 Flexeril 10 mg Oral  Impression & Plan    Medical Decision Making:  Herimlo Velasquez is a 84 year old male whose discomfort is very convincingly reproducible and I strongly suspect a benign musculoskeletal cause, as does the patient. He has no neurological findings to suggest acute compression of the cord, such as epidural abscess or hematoma. I considered aortic pathology, but think it is unlikely based on current findings. He is on oral opioids and should continue these. He inquired about injections, which I stated we do not perform in the ED. He specifically requested a muscle relaxer, which I thought was reasonable and this was prescribed. I advised close patient follow-up and return here for any unexpected deterioration.    Diagnosis:    ICD-10-CM    1. Thoracic back pain, unspecified back pain laterality, unspecified chronicity M54.6    2. Bilateral shoulder pain, unspecified chronicity M25.511     M25.512    3. Long term current use of anticoagulant therapy Z79.01        Disposition:  discharged to home    Discharge Medications:  Discharge Medication List as of 2/12/2017  7:52 PM      START taking these medications    Details   cyclobenzaprine (FLEXERIL) 10 MG tablet Take 1 tablet (10 mg) by mouth 3 times daily as needed for muscle spasms, Disp-15 tablet, R-0, Local Print             Scribe Disclosure:  Micki MONK  Oj, am serving as a scribe at 05:06 PM on 2/12/2017 to document services personally performed by Juno Loving MD, based on my observations and the provider's statements to me.  Micki Mcwilliams  2/12/2017    EMERGENCY DEPARTMENT       Juno Loving MD  02/13/17 [id number]

## 2017-02-12 NOTE — ED AVS SNAPSHOT
Emergency Department    [address] [place] [address]    Phone:  [phone]    Fax:  [phone]                                       Hermilo Velasquez   MRN: [id number]    Department:   Emergency Department   Date of Visit:  2/12/2017           After Visit Summary Signature Page     I have received my discharge instructions, and my questions have been answered. I have discussed any challenges I see with this plan with the nurse or doctor.    ..........................................................................................................................................  Patient/Patient Representative Signature      ..........................................................................................................................................  Patient Representative Print Name and Relationship to Patient    ..................................................               ................................................  Date                                            Time    ..........................................................................................................................................  Reviewed by Signature/Title    ...................................................              ..............................................  Date                                                            Time

## 2017-02-12 NOTE — ED AVS SNAPSHOT
Emergency Department    [address] [facility] [address]    Phone:  [phone]    Fax:  [phone]                                       Hermilo Velasquez   MRN: [id number]    Department:   Emergency Department   Date of Visit:  2/12/2017           Patient Information     Date Of Birth          11/3/1932        Your diagnoses for this visit were:     Thoracic back pain, unspecified back pain laterality, unspecified chronicity     Bilateral shoulder pain, unspecified chronicity     Long term current use of anticoagulant therapy        You were seen by Juno Loving MD.      Follow-up Information     Follow up with Bucky Boone MD. Call in 1 day.    Specialty:  Internal Medicine    Contact information:    [facility]  [address] [facility] [address]  [facility] [address] [phone]          Follow up with  Emergency Department.    Specialty:  EMERGENCY MEDICINE    Why:  As needed, If symptoms worsen    Contact information:    [address] [facility] [address] [phone]        Discharge Instructions         Discharge Instructions  Back Pain  You were seen today for back pain. Back pain can have many causes, but most will get better without surgery or other specific treatment. Sometimes there is a herniated ( slipped ) disc. We don t usually do MRI scans to look for these right away, since most herniated discs will get better on their own with time.  Today, we did not find any evidence that your back pain was caused by a serious condition, such as an infection, fracture, or tumor. However, sometimes symptoms develop over time and cannot be found during an emergency visit, so it is very important that you follow up with your primary doctor.  Return to the Emergency Department if:    You develop a fever with your back pain.     You have weakness or change in sensation in one or both legs.    You lose control of your bowels or bladder, or can t  empty your bladder.    Your pain gets much worse.     Follow-up with your doctor:    Unless your pain has completely gone away, please make an appointment with your doctor within one week.  You may need further management of your back pain, such as more pain medication, imaging such as an X-ray or MRI, or physical therapy.    What can I do to help myself?    Remain Active -- People are often afraid that they will hurt their back further or delay recovery by remaining active, but this is one of the best things you can do for your back. In fact, prolonged bed rest is not recommended. Studies have shown that people with low back pain recover faster when they remain active. Movement helps to bring blood flow to the muscles and relieve muscle spasms as well as preventing loss of muscle strength.    Heat -- Using a heating pad can help with low back pain during the first few weeks. Do not sleep with a heating pad, as you can be burned.     Pain medications - You may take a pain medication such as Tylenol  (acetaminophen), Advil , Nuprin  (ibuprofen) or Aleve  (naproxen).  If you have been given a narcotic such as Vicodin  (hydrocodone with acetaminophen), Percocet  (oxycodone with acetaminophen), codeine, or a muscle relaxant such as Flexeril  (cyclobenzaprine) or Soma  (carisoprodol), do not drive for four hours after you have taken it. If the narcotic contains Tylenol  (acetaminophen), do not take Tylenol  with it. All narcotics will cause constipation, so eat a high fiber diet.   If you were given a prescription for medicine here today, be sure to read all of the information (including the package insert) that comes with your prescription.  This will include important information about the medicine, its side effects, and any warnings that you need to know about.  The pharmacist who fills the prescription can provide more information and answer questions you may have about the medicine.  If you have questions or  concerns that the pharmacist cannot address, please call or return to the Emergency Department.   Opioid Medication Information    Pain medications are among the most commonly prescribed medicines, so we are including this information for all our patients. If you did not receive pain medication or get a prescription for pain medicine, you can ignore it.     You may have been given a prescription for an opioid (narcotic) pain medicine and/or have received a pain medicine while here in the Emergency Department. These medicines can make you drowsy or impaired. You must not drive, operate dangerous equipment, or engage in any other dangerous activities while taking these medications. If you drive while taking these medications, you could be arrested for DUI, or driving under the influence. Do not drink any alcohol while you are taking these medications.     Opioid pain medications can cause addiction. If you have a history of chemical dependency of any type, you are at a higher risk of becoming addicted to pain medications.  Only take these prescribed medications to treat your pain when all other options have been tried. Take it for as short a time and as few doses as possible. Store your pain pills in a secure place, as they are frequently stolen and provide a dangerous opportunity for children or visitors in your house to start abusing these powerful medications. We will not replace any lost or stolen medicine.  As soon as your pain is better, you should flush all your remaining medication.     Many prescription pain medications contain Tylenol  (acetaminophen), including Vicodin , Tylenol #3 , Norco , Lortab , and Percocet .  You should not take any extra pills of Tylenol  if you are using these prescription medications or you can get very sick.  Do not ever take more than 3000 mg of acetaminophen in any 24 hour period.    All opioids tend to cause constipation. Drink plenty of water and eat foods that have a lot of  fiber, such as fruits, vegetables, prune juice, apple juice and high fiber cereal.  Take a laxative if you don t move your bowels at least every other day. Miralax , Milk of Magnesia, Colace , or Senna  can be used to keep you regular.      Remember that you can always come back to the Emergency Department if you are not able to see your regular doctor in the amount of time listed above, if you get any new symptoms, or if there is anything that worries you.        Future Appointments        Provider Department Dept Phone Center    2/13/2017 9:50 AM DAMIEN [place] [facility] [place] [phone] [facility]    2/13/2017 10:45 AM Mario Kelly MD [facility] [place] [phone] [facility]    2/15/2017 11:00 AM [facility] [phone]       24 Hour Appointment Hotline       To make an appointment at any [facility], call [phone] ([phone]). If you don't have a family doctor or clinic, we will help you find one. [facility] are conveniently located to serve the needs of you and your family.             Review of your medicines      START taking        Dose / Directions Last dose taken    cyclobenzaprine 10 MG tablet   Commonly known as:  FLEXERIL   Dose:  10 mg   Quantity:  15 tablet        Take 1 tablet (10 mg) by mouth 3 times daily as needed for muscle spasms   Refills:  0          Our records show that you are taking the medicines listed below. If these are incorrect, please call your family doctor or clinic.        Dose / Directions Last dose taken    atorvastatin 10 MG tablet   Commonly known as:  LIPITOR   Dose:  10 mg   Quantity:  90 tablet        Take 1 tablet (10 mg) by mouth daily   Refills:  3        cetirizine 10 MG tablet   Commonly known as:  zyrTEC   Dose:  10 mg   Quantity:  30 tablet        Take 1 tablet (10 mg) by mouth every evening   Refills:  11         fluticasone 50 MCG/ACT spray   Commonly known as:  FLONASE   Dose:  1 spray        Spray 1 spray into both nostrils daily   Refills:  0        hydrOXYzine 25 MG tablet   Commonly known as:  ATARAX   Dose:  25-50 mg   Quantity:  120 tablet        Take 1-2 tablets (25-50 mg) by mouth every 6 hours as needed for itching   Refills:  3        metoprolol 100 MG 24 hr tablet   Commonly known as:  TOPROL-XL   Dose:  100 mg   Quantity:  180 tablet        Take 1 tablet (100 mg) by mouth daily   Refills:  3        ranitidine 150 MG tablet   Commonly known as:  ZANTAC   Dose:  150 mg   Quantity:  60 tablet        Take 1 tablet (150 mg) by mouth 2 times daily   Refills:  3        sodium chloride 0.65 % nasal spray   Commonly known as:  OCEAN   Dose:  1 spray        Spray 1 spray into both nostrils daily   Refills:  0        triamcinolone 0.1 % cream   Commonly known as:  KENALOG        Apply topically 2 times daily   Refills:  0        warfarin 5 MG tablet   Commonly known as:  COUMADIN   Dose:  5-7.5 mg   Quantity:  100 tablet        Take 1-1.5 tablets (5-7.5 mg) by mouth See Admin Instructions Take as prescribed by INR Clinic   Refills:  0                Prescriptions were sent or printed at these locations (1 Prescription)                   Other Prescriptions                Printed at Department/Unit printer (1 of 1)         cyclobenzaprine (FLEXERIL) 10 MG tablet                Procedures and tests performed during your visit     INR      Orders Needing Specimen Collection     None      Pending Results     No orders found from 2/10/2017 to 2/13/2017.            Pending Culture Results     No orders found from 2/10/2017 to 2/13/2017.             Test Results from your hospital stay     2/12/2017  6:24 PM - Interface, [employer] Results      Component Results     Component Value Ref Range & Units Status    INR 2.76 (H) 0.86 - 1.14 Final                Clinical Quality Measure: Blood Pressure Screening     Your blood pressure  "was checked while you were in the emergency department today. The last reading we obtained was  BP: 146/83 . Please read the guidelines below about what these numbers mean and what you should do about them.  If your systolic blood pressure (the top number) is less than 120 and your diastolic blood pressure (the bottom number) is less than 80, then your blood pressure is normal. There is nothing more that you need to do about it.  If your systolic blood pressure (the top number) is 120-139 or your diastolic blood pressure (the bottom number) is 80-89, your blood pressure may be higher than it should be. You should have your blood pressure rechecked within a year by a primary care provider.  If your systolic blood pressure (the top number) is 140 or greater or your diastolic blood pressure (the bottom number) is 90 or greater, you may have high blood pressure. High blood pressure is treatable, but if left untreated over time it can put you at risk for heart attack, stroke, or kidney failure. You should have your blood pressure rechecked by a primary care provider within the next 4 weeks.  If your provider in the emergency department today gave you specific instructions to follow-up with your doctor or provider even sooner than that, you should follow that instruction and not wait for up to 4 weeks for your follow-up visit.        Thank you for choosing [facility]       Thank you for choosing [facility] for your care. Our goal is always to provide you with excellent care. Hearing back from our patients is one way we can continue to improve our services. Please take a few minutes to complete the written survey that you may receive in the mail after you visit with us. Thank you!        [employer] Information     [employer] lets you send messages to your doctor, view your test results, renew your prescriptions, schedule appointments and more. To sign up, go to [email or web address] . Click on \"Log in\" on the left side of the " "screen, which will take you to the Welcome page. Then click on \"Sign up Now\" on the right side of the page.     You will be asked to enter the access code listed below, as well as some personal information. Please follow the directions to create your username and password.     Your access code is: [email or web address]  Expires: 2017 12:51 PM     Your access code will  in 90 days. If you need help or a new code, please call your [facility] clinic or [phone].        Care EveryWhere ID     This is your Care EveryWhere ID. This could be used by other organizations to access your [facility] medical records  [id number]        After Visit Summary       This is your record. Keep this with you and show to your community pharmacist(s) and doctor(s) at your next visit.                  "

## 2017-02-13 ENCOUNTER — OFFICE VISIT (OUTPATIENT)
Dept: CARDIOLOGY | Facility: CLINIC | Age: 82
End: 2017-02-13
Attending: INTERNAL MEDICINE
Payer: COMMERCIAL

## 2017-02-13 VITALS
WEIGHT: 180 LBS | DIASTOLIC BLOOD PRESSURE: 60 MMHG | SYSTOLIC BLOOD PRESSURE: 110 MMHG | HEART RATE: 68 BPM | BODY MASS INDEX: 25.77 KG/M2 | HEIGHT: 70 IN

## 2017-02-13 DIAGNOSIS — I48.0 PAROXYSMAL ATRIAL FIBRILLATION (H): Primary | ICD-10-CM

## 2017-02-13 DIAGNOSIS — E78.00 PURE HYPERCHOLESTEROLEMIA: ICD-10-CM

## 2017-02-13 DIAGNOSIS — Z95.2 S/P AVR: ICD-10-CM

## 2017-02-13 LAB
ALT SERPL W P-5'-P-CCNC: <5 U/L (ref 5–30)
CHOLEST SERPL-MCNC: 126 MG/DL
HDLC SERPL-MCNC: 44 MG/DL
LDLC SERPL CALC-MCNC: 69 MG/DL
NONHDLC SERPL-MCNC: 82 MG/DL
TRIGL SERPL-MCNC: 67 MG/DL

## 2017-02-13 PROCEDURE — 80061 LIPID PANEL: CPT | Performed by: INTERNAL MEDICINE

## 2017-02-13 PROCEDURE — 84460 ALANINE AMINO (ALT) (SGPT): CPT | Performed by: INTERNAL MEDICINE

## 2017-02-13 PROCEDURE — 36415 COLL VENOUS BLD VENIPUNCTURE: CPT | Performed by: INTERNAL MEDICINE

## 2017-02-13 PROCEDURE — 99214 OFFICE O/P EST MOD 30 MIN: CPT | Performed by: INTERNAL MEDICINE

## 2017-02-13 NOTE — PROGRESS NOTES
"HPI and Plan:   See dictation    Orders Placed This Encounter   Procedures     Follow-Up with Cardiologist       No orders of the defined types were placed in this encounter.      There are no discontinued medications.      Encounter Diagnoses   Name Primary?     S/P AVR      Paroxysmal atrial fibrillation (H) Yes       CURRENT MEDICATIONS:  Current Outpatient Prescriptions   Medication Sig Dispense Refill     cyclobenzaprine (FLEXERIL) 10 MG tablet Take 1 tablet (10 mg) by mouth 3 times daily as needed for muscle spasms 15 tablet 0     ranitidine (ZANTAC) 150 MG tablet Take 1 tablet (150 mg) by mouth 2 times daily 60 tablet 3     cetirizine (ZYRTEC) 10 MG tablet Take 1 tablet (10 mg) by mouth every evening 30 tablet 11     triamcinolone (KENALOG) 0.1 % cream Apply topically 2 times daily       hydrOXYzine (ATARAX) 25 MG tablet Take 1-2 tablets (25-50 mg) by mouth every 6 hours as needed for itching 120 tablet 3     warfarin (COUMADIN) 5 MG tablet Take 1-1.5 tablets (5-7.5 mg) by mouth See Admin Instructions Take as prescribed by INR Clinic 100 tablet 0     metoprolol (TOPROL-XL) 100 MG 24 hr tablet Take 1 tablet (100 mg) by mouth daily 180 tablet 3     fluticasone (FLONASE) 50 MCG/ACT nasal spray Spray 1 spray into both nostrils daily       sodium chloride (OCEAN) 0.65 % nasal spray Spray 1 spray into both nostrils daily       atorvastatin (LIPITOR) 10 MG tablet Take 1 tablet (10 mg) by mouth daily 90 tablet 3       ALLERGIES     Allergies   Allergen Reactions     Lasix [Furosemide] Rash     Penicillins Rash     \"broke out from injection\" 60 yrs ago         PAST MEDICAL HISTORY:  Past Medical History   Diagnosis Date     Aortic stenosis      Severe AS, 9/2015 AVR - [facility] TRIFECTA Bovine bioprosthesis 25MM TF-25A     Atrial fibrillation (H)      9/2015 Paroxysmal post op Afib - discharged on Warfarin and a beta blocker     Deep vein thrombosis (H)      GERD (gastroesophageal reflux disease)      Heart murmur      " Monoclonal gammopathy      plasmacyte prominent causing monoclonal gammopathy     Need for SBE (subacute bacterial endocarditis) prophylaxis      Neuropathy (H)      Other and unspecified hyperlipidemia      Other malignant lymphomas      non hodgkin's lymphoma     RBBB (right bundle branch block)      Severe sepsis with acute organ dysfunction (H) 11/16/2015     Unspecified hereditary and idiopathic peripheral neuropathy        PAST SURGICAL HISTORY:  Past Surgical History   Procedure Laterality Date     Appendectomy       Tonsillectomy       Knee surgery       arthroscopic right knee surgery      Rotator cuff repair rt/lt       bilateral     Turp       Hernia repair  2006     Spine surgery       3 spine surgeries     Repair ligament ankle  2/23/2012     Procedure:REPAIR LIGAMENT ANKLE; LEFT TARSAL TUNNEL RELEASE OF KNOT OF ARIEL RELEASE; Surgeon:SAUL PUENTE; Location: OR     Herniorrhaphy ventral  4/17/2013     Procedure: HERNIORRHAPHY VENTRAL;  VENTRAL HERNIA REPAIR WITH MESH;  Surgeon: Patel Guzman MD;  Location:  OR     Replace valve aortic N/A 9/3/2015     Procedure: REPLACE VALVE AORTIC;  Surgeon: Antonino Mitchell MD;  Location:  OR     Esophagoscopy, gastroscopy, duodenoscopy (egd), combined N/A 11/28/2015     Procedure: COMBINED ESOPHAGOSCOPY, GASTROSCOPY, DUODENOSCOPY (EGD);  Surgeon: Danis Castillo MD;  Location:  GI     As total knee arthroplasty         FAMILY HISTORY:  Family History   Problem Relation Age of Onset     C.A.D. Father      Emphysema Father        SOCIAL HISTORY:  Social History     Social History     Marital status:      Spouse name: N/A     Number of children: N/A     Years of education: N/A     Social History Main Topics     Smoking status: Former Smoker     Packs/day: 2.00     Years: 55.00     Quit date: 1/22/1998     Smokeless tobacco: Never Used     Alcohol use 0.0 oz/week     0 Standard drinks or equivalent per week      Comment:  "occassionaly     Drug use: No     Sexual activity: Not Currently     Partners: Female     Other Topics Concern     Caffeine Concern No     occ     Special Diet No     Exercise No     Seat Belt Yes     Social History Narrative    , [place]    Retired  - self employed       Review of Systems:  Skin:  Negative       Eyes:  Positive for glasses    ENT:  Negative      Respiratory:  Negative dyspnea on exertion     Cardiovascular:  Negative for;palpitations;chest pain Positive for;edema better  Gastroenterology: Negative nausea;vomiting;poor appetite    Genitourinary:  not assessed      Musculoskeletal:  Positive for arthritis;joint stiffness;joint pain    Neurologic:  Negative numbness or tingling of feet    Psychiatric:  Negative      Heme/Lymph/Imm:  Negative allergies    Endocrine:  Negative        Physical Exam:  Vitals: /60  Pulse 68  Ht 1.778 m (5' 10\")  Wt 81.6 kg (180 lb)  BMI 25.83 kg/m2    Constitutional:  cooperative;in no acute distress        Skin:  warm and dry to the touch        Head:  normocephalic        Eyes:  sclera white        ENT:  no pallor or cyanosis        Neck:  no stiffness        Chest:  clear to auscultation          Cardiac: regular rhythm       systolic murmur          Abdomen:  abdomen soft        Vascular: pulses full and equal                                        Extremities and Back:  no edema              Neurological:  affect appropriate              CC  Mario Kelly MD   [facility]  [address]  [place] [address]              "

## 2017-02-13 NOTE — PROGRESS NOTES
HISTORY OF PRESENT ILLNESS:  Mr. Velasquez is a pleasant 84-year-old gentleman with a history of minimal coronary artery disease, aortic valvular stenosis for which he underwent aortic valve replacement with a bioprosthetic valve in 2015, paroxysmal atrial fibrillation, history of a GI bleed with an AVM and colonic polyps for which he was not initially anticoagulated, history of bilateral pulmonary emboli secondary to a DVT of the left lower extremity for which he underwent IVC placement and was eventually placed on Coumadin who returns in close clinical followup.      The patient was seen by Evelin Brambila in November and has been doing well since then.  Evelin at that time had clarified his beta blocker dose which was unclear.      The patient has been feeling well from a cardiovascular standpoint, denying any chest pain, chest pressure, shortness of breath, orthopnea or paroxysmal nocturnal dyspnea.      His most recent transthoracic echocardiogram was performed in July and showed a visual ejection fraction of 50-55% with a mildly dilated RV with normal RV function.  The aortic valve was not well seen, but was functioning normally.      Please see my separate note with his full physical examination.       IMPRESSION AND PLAN:  Mr. Velasquez is a pleasant 84-year-old gentleman with a history of a bioprosthetic aortic valve replacement in 2015, minimal coronary artery disease detected at the time of his preoperative evaluation, history of DVT and PE, along with paroxysmal atrial fibrillation, and a history of a GI bleed complicated by AVN and colonic polyps who has been doing well from a cardiovascular standpoint.  He has had no bleeding difficulties on his warfarin.  At this time, given his history of a PE and paroxysmal atrial fibrillation with a CHADS-VASc score of at least 3, I would recommend continued anticoagulation with warfarin.  His blood pressure is well controlled and I will continue his beta  blocker dose unchanged at the present time.  On exam, he appears to be in sinus rhythm.  He has been tolerating his low high-dose atorvastatin well and his most recent lipids that were performed today show an LDL of 69.  I have asked the patient to come back and see me in routine followup in 1 year.         DAMIAN APONTE MD             D: 2017 11:22   T: 2017 13:56   MT: KENNEDY      Name:     CLYDE RODRIGUEZ   MRN:      [id number]        Account:      [id number]   :      1932           Service Date: 2017      Document: [id number]

## 2017-02-13 NOTE — MR AVS SNAPSHOT
"              After Visit Summary   2/13/2017    Hermilo Velasquez    MRN: [id number]           Patient Information     Date Of Birth          11/3/1932        Visit Information        Provider Department      2/13/2017 10:45 AM Mario Kelly MD [facility]        Today's Diagnoses     Paroxysmal atrial fibrillation (H)    -  1    S/P AVR           Follow-ups after your visit        Additional Services     Follow-Up with Cardiologist                 Your next 10 appointments already scheduled     Feb 15, 2017 11:00 AM CST   Anticoagulation Visit with  [facility] CLINIC   [facility]    [address]  [place] [address]   [phone]              Future tests that were ordered for you today     Open Future Orders        Priority Expected Expires Ordered    Follow-Up with Cardiologist Routine 2/13/2018 6/28/2018 2/13/2017            Who to contact     If you have questions or need follow up information about today's clinic visit or your schedule please contact [facility] directly at [phone].  Normal or non-critical lab and imaging results will be communicated to you by MyChart, letter or phone within 4 business days after the clinic has received the results. If you do not hear from us within 7 days, please contact the clinic through [employer] or phone. If you have a critical or abnormal lab result, we will notify you by phone as soon as possible.  Submit refill requests through [employer] or call your pharmacy and they will forward the refill request to us. Please allow 3 business days for your refill to be completed.          Additional Information About Your Visit        [employer] Information     [employer] lets you send messages to your doctor, view your test results, renew your prescriptions, schedule appointments and more. To sign up, go to [email or web address] . Click on \"Log in\" on " "the left side of the screen, which will take you to the Welcome page. Then click on \"Sign up Now\" on the right side of the page.     You will be asked to enter the access code listed below, as well as some personal information. Please follow the directions to create your username and password.     Your access code is: [email or web address]  Expires: 2017 12:51 PM     Your access code will  in 90 days. If you need help or a new code, please call your [place] clinic or [phone].        Care EveryWhere ID     This is your Care EveryWhere ID. This could be used by other organizations to access your [place] medical records  [id number]        Your Vitals Were     Pulse Height BMI (Body Mass Index)             68 1.778 m (5' 10\") 25.83 kg/m2          Blood Pressure from Last 3 Encounters:   17 110/60   17 146/83   17 136/73    Weight from Last 3 Encounters:   17 81.6 kg (180 lb)   17 83.9 kg (185 lb)   17 83.9 kg (185 lb)              We Performed the Following     Follow-Up with Cardiologist        Primary Care Provider Office Phone # Fax #    Bucky Boone [phone]       [facility] [address]  [facility] [address]        Thank you!     Thank you for choosing [facility] [place]  for your care. Our goal is always to provide you with excellent care. Hearing back from our patients is one way we can continue to improve our services. Please take a few minutes to complete the written survey that you may receive in the mail after your visit with us. Thank you!             Your Updated Medication List - Protect others around you: Learn how to safely use, store and throw away your medicines at www.disposemymeds.org.          This list is accurate as of: 17 11:02 AM.  Always use your most recent med list.                   Brand Name Dispense Instructions for use    atorvastatin 10 MG tablet    " LIPITOR    90 tablet    Take 1 tablet (10 mg) by mouth daily       cetirizine 10 MG tablet    zyrTEC    30 tablet    Take 1 tablet (10 mg) by mouth every evening       cyclobenzaprine 10 MG tablet    FLEXERIL    15 tablet    Take 1 tablet (10 mg) by mouth 3 times daily as needed for muscle spasms       fluticasone 50 MCG/ACT spray    FLONASE     Spray 1 spray into both nostrils daily       hydrOXYzine 25 MG tablet    ATARAX    120 tablet    Take 1-2 tablets (25-50 mg) by mouth every 6 hours as needed for itching       metoprolol 100 MG 24 hr tablet    TOPROL-XL    180 tablet    Take 1 tablet (100 mg) by mouth daily       ranitidine 150 MG tablet    ZANTAC    60 tablet    Take 1 tablet (150 mg) by mouth 2 times daily       sodium chloride 0.65 % nasal spray    OCEAN     Spray 1 spray into both nostrils daily       triamcinolone 0.1 % cream    KENALOG     Apply topically 2 times daily       warfarin 5 MG tablet    COUMADIN    100 tablet    Take 1-1.5 tablets (5-7.5 mg) by mouth See Admin Instructions Take as prescribed by INR Clinic

## 2017-02-15 ENCOUNTER — TELEPHONE (OUTPATIENT)
Dept: FAMILY MEDICINE | Facility: CLINIC | Age: 82
End: 2017-02-15

## 2017-02-15 ENCOUNTER — ANTICOAGULATION THERAPY VISIT (OUTPATIENT)
Dept: FAMILY MEDICINE | Facility: CLINIC | Age: 82
End: 2017-02-15
Payer: COMMERCIAL

## 2017-02-15 DIAGNOSIS — Z79.01 LONG-TERM (CURRENT) USE OF ANTICOAGULANTS: ICD-10-CM

## 2017-02-15 DIAGNOSIS — I26.99 PULMONARY EMBOLISM, BILATERAL (H): ICD-10-CM

## 2017-02-15 DIAGNOSIS — I48.0 PAROXYSMAL ATRIAL FIBRILLATION (H): ICD-10-CM

## 2017-02-15 PROCEDURE — 99207 ZZC NO CHARGE NURSE ONLY: CPT | Performed by: INTERNAL MEDICINE

## 2017-02-15 NOTE — PROGRESS NOTES
ANTICOAGULATION FOLLOW-UP CLINIC VISIT    Patient Name:  Hermilo Velasquez  Date:  2/15/2017  Contact Type:  Telephone/ Dose instructions given to patient    SUBJECTIVE:     Patient Findings     Comments Patient seen in ER 2/12 for neck and shoulder pain.  Rx - Flexeril.  INR 2.76.  Follow up with PCP 2/17.           OBJECTIVE    INR   Date Value Ref Range Status   02/12/2017 2.76 (H) 0.86 - 1.14 Final       ASSESSMENT / PLAN  INR assessment THER    Recheck INR In: 4 WEEKS    INR Location Outside lab      Anticoagulation Summary as of 2/15/2017     INR goal 2.0-3.0   Today's INR 2.76 (2/12/2017)   Maintenance plan 7.5 mg (5 mg x 1.5) on Mon, Wed, Fri; 5 mg (5 mg x 1) all other days   Full instructions 7.5 mg on Mon, Wed, Fri; 5 mg all other days   Weekly total 42.5 mg   Weekly max dose 45 mg   No change documented Lin Hood RN   Plan last modified Mireya Otto RN (1/4/2017)   Next INR check 3/13/2017   Priority INR   Target end date Indefinite    Indications   Long-term (current) use of anticoagulants [Z79.01] [Z79.01]  Pulmonary embolism  bilateral (H) [I26.99]  Paroxysmal atrial fibrillation (H) [I48.0]         Anticoagulation Episode Summary     INR check location [facility]    Preferred lab     Send INR reminders to [facility]AG[facility]    Comments       Anticoagulation Care Providers     Provider Role Specialty Phone number    Bucky Boone MD [facility] [phone]            See the Encounter Report to view Anticoagulation Flowsheet and Dosing Calendar (Go to Encounters tab in chart review, and find the Anticoagulation Therapy Visit)    Dosage adjustment made based on physician directed care plan.    Lin Hood, CECILIA

## 2017-02-15 NOTE — TELEPHONE ENCOUNTER
Reason for Call:  INR    Who is calling?  patient    Phone number:      Fax number:      Name of caller:     INR Value: patient called and cancelled INR appointment today, INR  2.76 was drawn  On 2/13 at [facility], patient went in due to pain in neck and shoulders, patient scheduled follow up appt [facility] Dr Boone [facility]  Are there any other concerns:      Can we leave a detailed message on this number? YES    Phone number patient can be reached at: Home number on file [phone] (home)      Call taken on 2/15/2017 at 9:21 AM by Queta Shaikh

## 2017-02-15 NOTE — MR AVS SNAPSHOT
Hermilo Velasquez   2/15/2017   Anticoagulation Therapy Visit    Description:  84 year old male   Provider:  Bucky Boone MD   Department:  [facility]           INR as of 2/15/2017     Today's INR 2.76 (2/12/2017)      Anticoagulation Summary as of 2/15/2017     INR goal 2.0-3.0   Today's INR 2.76 (2/12/2017)   Full instructions 7.5 mg on Mon, Wed, Fri; 5 mg all other days   Next INR check 3/13/2017    Indications   Long-term (current) use of anticoagulants [Z79.01] [Z79.01]  Pulmonary embolism  bilateral (H) [I26.99]  Paroxysmal atrial fibrillation (H) [I48.0]         Your next Anticoagulation Clinic appointment(s)     Mar 13, 2017 11:00 AM CDT   Anticoagulation Visit with  ANTICOAGULATION CLINIC   [facility]    [address]  [place] [address]   [phone]              February 2017 Details    Sun Mon Tue Wed Thu Fri Sat        1               2               3               4                 5               6               7               8               9               10               11                 12               13               14               15      7.5 mg   See details      16      5 mg         17      7.5 mg         18      5 mg           19      5 mg         20      7.5 mg         21      5 mg         22      7.5 mg         23      5 mg         24      7.5 mg         25      5 mg           26      5 mg         27      7.5 mg         28      5 mg              Date Details   02/15 This INR check               How to take your warfarin dose     To take:  5 mg Take 1 of the 5 mg tablets.    To take:  7.5 mg Take 1.5 of the 5 mg tablets.           March 2017 Details    Sun Mon Tue Wed Thu Fri Sat        1      7.5 mg         2      5 mg         3      7.5 mg         4      5 mg           5      5 mg         6      7.5 mg         7      5 mg         8      7.5 mg         9      5 mg         10      7.5 mg         11      5 mg            12      5 mg         13            14               15               16               17               18                 19               20               21               22               23               24               25                 26               27               28               29               30               31                 Date Details   No additional details    Date of next INR:  3/13/2017         How to take your warfarin dose     To take:  5 mg Take 1 of the 5 mg tablets.    To take:  7.5 mg Take 1.5 of the 5 mg tablets.

## 2017-02-17 ENCOUNTER — OFFICE VISIT (OUTPATIENT)
Dept: FAMILY MEDICINE | Facility: CLINIC | Age: 82
End: 2017-02-17
Payer: COMMERCIAL

## 2017-02-17 VITALS
HEART RATE: 97 BPM | DIASTOLIC BLOOD PRESSURE: 81 MMHG | OXYGEN SATURATION: 93 % | TEMPERATURE: 98.3 F | WEIGHT: 185 LBS | SYSTOLIC BLOOD PRESSURE: 147 MMHG | HEIGHT: 70 IN | BODY MASS INDEX: 26.48 KG/M2

## 2017-02-17 DIAGNOSIS — M48.02 CERVICAL STENOSIS OF SPINAL CANAL: Primary | ICD-10-CM

## 2017-02-17 PROCEDURE — 99214 OFFICE O/P EST MOD 30 MIN: CPT | Performed by: INTERNAL MEDICINE

## 2017-02-17 RX ORDER — CYCLOBENZAPRINE HCL 10 MG
10 TABLET ORAL 3 TIMES DAILY PRN
Qty: 90 TABLET | Refills: 0 | Status: SHIPPED | OUTPATIENT
Start: 2017-02-17 | End: 2017-06-06

## 2017-02-17 NOTE — PROGRESS NOTES
SUBJECTIVE:                                                      Patient presents for Hospital Followup Visit:    Hospital:  [facility]      Date of Admission: 02/12/17  Date of Discharge:   Reason(s) for Admission: thoracic back pain            Problems taking medications regularly:  None       Medication changes since discharge: None       Problems adhering to non-medication therapy:  None  Sophia Rivera MA    Subjective  Cervical stenosis  Shoulder Pain   Hermilo Velasquez was recently seen in the emergency room on February twelfth for upper back and shoulder pain which was ultimately felt to be musculoskeletal in nature. He was treated with oral opiates and was given a prescription for a muscle relaxant and recommended follow-up in the clinic here today.    He has been taking the muscle relaxant medications, but not feeling to be too helpful.  He is still limited in his ability to turn his neck from side to side.  He has a history of neck stiffness in the past and was treated with cervical spine epidural.     He has no fever or chills.  He has some stiffness in his shoulders and upper arms.      Past medical history, medications, allergies, social history, family history reviewed and updated in [facility] as of 2/17/2017 .    ROS  CONSTITUTIONAL: no fatigue, no unexpected change in weight  SKIN: no worrisome rashes, no worrisome moles, no worrisome lesions  EYES: no acute vision problems or changes  ENT: no ear problems, no mouth problems, no throat problems  RESP: no significant cough, no shortness of breath  CV: no chest pain, no palpitations, no new or worsening peripheral edema  GI: no nausea, no vomiting, no constipation, no diarrhea  : no frequency, no dysuria, no hematuria  MS: no claudication, no myalgias, no joint aches  PSYCHIATRIC: no changes in mood or affect      Objective:  Vitals  /81 (BP Location: Right arm, Cuff Size: Adult Large)  Pulse 97  Temp 98.3  F (36.8  C)  "(Tympanic)  Ht 5' 10\" (1.778 m)  Wt 185 lb (83.9 kg)  SpO2 93%  BMI 26.54 kg/m2  GEN: Alert Oriented x3 NAD  HEENT: Atraumatic, normocephalic, neck supple, no thyromegaly, negative cervical adenopathy  SKIN: No visible skin lesion or ulcerations  EXT: No edema bilateral lower extremities  NEURO: Strength upper extremity 5/5 and sensation normal upper extremity   PSYCH: Mood good, affect mood congruent    No results found for this or any previous visit (from the past 24 hour(s)).    Assessment/Plan:  Patient Instructions   (M48.02) Cervical stenosis of spinal canal  (primary encounter diagnosis)  Comment: After speaking to Dr. You in neurology it was recommended to pursue an MRI of the cervical spine given previous history of spinal stenosis and we can compare your current MRI to your previous and then determine appropriate course of action.  [phone]  Plan: MR Cervical Spine w/o Contrast              25 minutes spent with patient.  Over 50% of time counseling     Follow up pending MRI    Disclaimer: This note consists of symbols derived from keyboarding, dictation and/or voice recognition software. As a result, there may be errors in the script that have gone undetected. Please consider this when interpreting information found in this chart.    Bucky Boone MD  [phone]        "

## 2017-02-17 NOTE — MR AVS SNAPSHOT
After Visit Summary   2/17/2017    Hermilo Velasquez    MRN: [id number]           Patient Information     Date Of Birth          11/3/1932        Visit Information        Provider Department      2/17/2017 3:30 PM Bucky Boone MD [facility]        Today's Diagnoses     Cervical stenosis of spinal canal    -  1      Care Instructions    (M48.02) Cervical stenosis of spinal canal  (primary encounter diagnosis)  Comment: After speaking to Dr. You in neurology it was recommended to pursue an MRI of the cervical spine given previous history of spinal stenosis and we can compare your current MRI to your previous and then determine appropriate course of action.  [phone]  Plan: MR Cervical Spine w/o Contrast                    Follow-ups after your visit        Your next 10 appointments already scheduled     Mar 13, 2017 11:00 AM CDT   Anticoagulation Visit with [facility] ANTICOAGULATION CLINIC   [facility] [place] ([facility])    [address]  [place] [address] [phone]              Future tests that were ordered for you today     Open Future Orders        Priority Expected Expires Ordered    MR Cervical Spine w/o Contrast Routine  2/17/2018 2/17/2017            Who to contact     If you have questions or need follow up information about today's clinic visit or your schedule please contact [facility] directly at [phone].  Normal or non-critical lab and imaging results will be communicated to you by MyChart, letter or phone within 4 business days after the clinic has received the results. If you do not hear from us within 7 days, please contact the clinic through MyChart or phone. If you have a critical or abnormal lab result, we will notify you by phone as soon as possible.  Submit refill requests through [employer] or call your pharmacy and they will forward the refill request to us. Please allow 3 business days for your refill to be  "completed.          Additional Information About Your Visit        [employer] Information     [employer] lets you send messages to your doctor, view your test results, renew your prescriptions, schedule appointments and more. To sign up, go to [email or web address] . Click on \"Log in\" on the left side of the screen, which will take you to the Welcome page. Then click on \"Sign up Now\" on the right side of the page.     You will be asked to enter the access code listed below, as well as some personal information. Please follow the directions to create your username and password.     Your access code is: [email or web address]  Expires: 2017 12:51 PM     Your access code will  in 90 days. If you need help or a new code, please call your [place] clinic or [phone].        Care EveryWhere ID     This is your [facility] EveryWhere ID. This could be used by other organizations to access your [place] medical records  [id number]        Your Vitals Were     Pulse Temperature Height Pulse Oximetry BMI (Body Mass Index)       97 98.3  F (36.8  C) (Tympanic) 5' 10\" (1.778 m) 93% 26.54 kg/m2        Blood Pressure from Last 3 Encounters:   17 147/81   17 110/60   17 146/83    Weight from Last 3 Encounters:   17 185 lb (83.9 kg)   17 180 lb (81.6 kg)   17 185 lb (83.9 kg)               Primary Care Provider Office Phone # Fax #    Bucky Boone [phone]       [facility] [address]  [facility] [address]        Thank you!     Thank you for choosing [facility]  for your care. Our goal is always to provide you with excellent care. Hearing back from our patients is one way we can continue to improve our services. Please take a few minutes to complete the written survey that you may receive in the mail after your visit with us. Thank you!             Your Updated Medication List - Protect others around you: Learn how to safely use, store and " throw away your medicines at www.disposemymeds.org.          This list is accurate as of: 2/17/17  4:42 PM.  Always use your most recent med list.                   Brand Name Dispense Instructions for use    atorvastatin 10 MG tablet    LIPITOR    90 tablet    Take 1 tablet (10 mg) by mouth daily       cetirizine 10 MG tablet    zyrTEC    30 tablet    Take 1 tablet (10 mg) by mouth every evening       cyclobenzaprine 10 MG tablet    FLEXERIL    15 tablet    Take 1 tablet (10 mg) by mouth 3 times daily as needed for muscle spasms       fluticasone 50 MCG/ACT spray    FLONASE     Spray 1 spray into both nostrils daily       hydrOXYzine 25 MG tablet    ATARAX    120 tablet    Take 1-2 tablets (25-50 mg) by mouth every 6 hours as needed for itching       metoprolol 100 MG 24 hr tablet    TOPROL-XL    180 tablet    Take 1 tablet (100 mg) by mouth daily       ranitidine 150 MG tablet    ZANTAC    60 tablet    Take 1 tablet (150 mg) by mouth 2 times daily       sodium chloride 0.65 % nasal spray    OCEAN     Spray 1 spray into both nostrils daily       triamcinolone 0.1 % cream    KENALOG     Apply topically 2 times daily       warfarin 5 MG tablet    COUMADIN    100 tablet    Take 1-1.5 tablets (5-7.5 mg) by mouth See Admin Instructions Take as prescribed by INR Clinic

## 2017-02-17 NOTE — PATIENT INSTRUCTIONS
(M48.02) Cervical stenosis of spinal canal  (primary encounter diagnosis)  Comment: After speaking to Dr. You in neurology it was recommended to pursue an MRI of the cervical spine given previous history of spinal stenosis and we can compare your current MRI to your previous and then determine appropriate course of action.  [phone]  Plan: MR Cervical Spine w/o Contrast

## 2017-02-17 NOTE — NURSING NOTE
"Chief Complaint   Patient presents with     ER F/U       Initial /81 (BP Location: Right arm, Cuff Size: Adult Large)  Pulse 97  Temp 98.3  F (36.8  C) (Tympanic)  Ht 5' 10\" (1.778 m)  Wt 185 lb (83.9 kg)  SpO2 93%  BMI 26.54 kg/m2 Estimated body mass index is 26.54 kg/(m^2) as calculated from the following:    Height as of this encounter: 5' 10\" (1.778 m).    Weight as of this encounter: 185 lb (83.9 kg).  Medication Reconciliation: complete   Sophia Rivera MA    "

## 2017-02-22 ENCOUNTER — HOSPITAL ENCOUNTER (OUTPATIENT)
Dept: MRI IMAGING | Facility: CLINIC | Age: 82
Discharge: HOME | End: 2017-02-22
Attending: INTERNAL MEDICINE | Admitting: INTERNAL MEDICINE
Payer: COMMERCIAL

## 2017-02-22 DIAGNOSIS — M48.02 CERVICAL STENOSIS OF SPINAL CANAL: ICD-10-CM

## 2017-02-22 PROCEDURE — 72141 MRI NECK SPINE W/O DYE: CPT

## 2017-02-25 ENCOUNTER — TELEPHONE (OUTPATIENT)
Dept: NURSING | Facility: CLINIC | Age: 82
End: 2017-02-25

## 2017-02-26 NOTE — TELEPHONE ENCOUNTER
Call Type: Triage Call    Presenting Problem: Patient calling to report increased nosebleeds  today. Nosebleeds have stopped now but patient on coumadin and  wondering if he should decrease his dose. Explained he would need  INR check first to see if his dose should be adjusted. Reviewed  urgent symptoms and when to call back and transferred to scheduling  to set up INR for Monday.  Triage Note:  Guideline Title: Nosebleed  Recommended Disposition: See Provider within 72 Hours  Original Inclination: Wanted to speak with a nurse  Override Disposition:  Intended Action: Follow advice given  Physician Contacted: No  Nosebleed is now controlled AND individual is taking blood thinning medications OR  has an underlying condition affecting blood clotting ?  YES  Lacerations on nose or face ? NO  New or worsening signs and symptoms that may indicate shock ? NO  Unexplained new bruising on other parts of body or other unexplained bleeding  (from gums, in urine or stool) ? NO  Unconscious now or within last hour OR for more than 5 minutes at time of injury,  after major trauma to head, neck or face ? NO  Recurrent bleeding (3 or more episodes in last week) that stops with 10 minutes of  direct pressure or stops spontaneously ? NO  Nasal bleeding not controlled after 2 attempts of constant direct pressure for a  full 10 minutes by the clock (each attempt) ? NO  Bleeding from one or both nostrils initially controlled with direct pressure AND  another episode of bleeding the same day ? NO  Unbearable headache ? NO  Self measured blood pressure 180/120 or higher ? NO  Blunt and/or penetrating injury to face involving more than one facial structure ?  NO  Nosebleed as a result of trauma to the nose/face ? NO  Blood is draining down back of throat causing choking sensation, difficulty  breathing, or coughing up or vomiting blood ? NO  Unable to control nosebleed (2 attempts of constant direct pressure for a full 10  minutes by a  clock) AND individual is taking blood thinning medications OR has an  underlying condition affecting blood clotting ? NO  Known foreign body in nose and unsuccessful removal attempt ? NO  Physician Instructions:  Care Advice: Call provider if symptoms continue, worsen, or new symptoms  develop.  CAUTIONS  HEALTH PROMOTION / MAINTENANCE  Post Nosebleed Care: * Avoid blowing nose for 12 hours after bleeding  episode. * Do not pick nose. * Do not strain or bend down to lift anything  heavy for 3 days. * Use a cool mist humidifier to moisten room air. *  Elevate head on 3 pillows when lying down for the next 3 days. * Consider  use of nonprescription nasal mucus membrane ointment or spray per label or  pharmacist's instructions

## 2017-02-27 ENCOUNTER — ANTICOAGULATION THERAPY VISIT (OUTPATIENT)
Dept: NURSING | Facility: CLINIC | Age: 82
End: 2017-02-27
Payer: COMMERCIAL

## 2017-02-27 DIAGNOSIS — Z79.01 LONG-TERM (CURRENT) USE OF ANTICOAGULANTS: ICD-10-CM

## 2017-02-27 DIAGNOSIS — I26.99 PULMONARY EMBOLISM, BILATERAL (H): ICD-10-CM

## 2017-02-27 DIAGNOSIS — I48.0 PAROXYSMAL ATRIAL FIBRILLATION (H): ICD-10-CM

## 2017-02-27 LAB — INR POINT OF CARE: 3.3 (ref 0.86–1.14)

## 2017-02-27 PROCEDURE — 99207 ZZC NO CHARGE NURSE ONLY: CPT

## 2017-02-27 PROCEDURE — 36416 COLLJ CAPILLARY BLOOD SPEC: CPT

## 2017-02-27 PROCEDURE — 85610 PROTHROMBIN TIME: CPT | Mod: QW

## 2017-02-27 NOTE — MR AVS SNAPSHOT
Hermilo MADRID Eva   2/27/2017 2:45 PM   Anticoagulation Therapy Visit    Description:  84 year old male   Provider:   ANTICOAGULATION CLINIC   Department:   Nurse           INR as of 2/27/2017     Today's INR 3.3!      Anticoagulation Summary as of 2/27/2017     INR goal 2.0-3.0   Today's INR 3.3!   Full instructions 2/27: 5 mg; Otherwise 7.5 mg on Mon, Wed, Fri; 5 mg all other days   Next INR check 3/13/2017    Indications   Long-term (current) use of anticoagulants [Z79.01] [Z79.01]  Pulmonary embolism  bilateral (H) [I26.99]  Paroxysmal atrial fibrillation (H) [I48.0]         Your next Anticoagulation Clinic appointment(s)     Mar 13, 2017 11:00 AM CDT   Anticoagulation [facility] with  [facility] CLINIC   [facility]    [address]  [place] [address]   [phone]            Mar 13, 2017  2:45 PM CDT   Anticoagulation Visit with  ANTICOAGULATION CLINIC   [facility]    [address]  [place] [address]   [phone]              Contact Numbers     Clinic Number:         February 2017 Details    Sun Mon Tue Wed Thu Fri Sat        1               2               3               4                 5               6               7               8               9               10               11                 12               13               14               15               16               17               18                 19               20               21               22               23               24               25                 26               27      5 mg   See details      28      5 mg              Date Details   02/27 This INR check               How to take your warfarin dose     To take:  5 mg Take 1 of the 5 mg tablets.           March 2017 Details    Sun Mon Tue Wed Thu Fri Sat        1      7.5 mg         2      5 mg         3      7.5 mg         4      5 mg           5      5 mg         6       7.5 mg         7      5 mg         8      7.5 mg         9      5 mg         10      7.5 mg         11      5 mg           12      5 mg         13            14               15               16               17               18                 19               20               21               22               23               24               25                 26               27               28               29               30               31                 Date Details   No additional details    Date of next INR:  3/13/2017         How to take your warfarin dose     To take:  5 mg Take 1 of the 5 mg tablets.    To take:  7.5 mg Take 1.5 of the 5 mg tablets.

## 2017-02-27 NOTE — PROGRESS NOTES
ANTICOAGULATION FOLLOW-UP CLINIC VISIT    Patient Name:  Hermilo Velasquez  Date:  2/27/2017  Contact Type:  Face to Face    SUBJECTIVE:     Patient Findings     Positives Nose bleeds, Bruising    Comments Patient states he had 2 bloody noses a few days ago and also noted extra bruising.           OBJECTIVE    INR Protime   Date Value Ref Range Status   02/27/2017 3.3 (A) 0.86 - 1.14 Final       ASSESSMENT / PLAN  INR assessment SUPRA    Recheck INR In: 2 WEEKS    INR Location Clinic      Anticoagulation Summary as of 2/27/2017     INR goal 2.0-3.0   Today's INR 3.3!   Maintenance plan 7.5 mg (5 mg x 1.5) on Mon, Wed, Fri; 5 mg (5 mg x 1) all other days   Full instructions 2/27: 5 mg; Otherwise 7.5 mg on Mon, Wed, Fri; 5 mg all other days   Weekly total 42.5 mg   Weekly max dose 45 mg   Plan last modified Mireya Otto RN (1/4/2017)   Next INR check 3/13/2017   Priority INR   Target end date Indefinite    Indications   Long-term (current) use of anticoagulants [Z79.01] [Z79.01]  Pulmonary embolism  bilateral (H) [I26.99]  Paroxysmal atrial fibrillation (H) [I48.0]         Anticoagulation Episode Summary     [facility]    Preferred lab     Send INR reminders to [facility] ANTICOAGULATION    Comments       Anticoagulation Care Providers     Provider Role Specialty Phone number    Bucky Boone MD [facility] Internal Medicine [phone]            See the Encounter Report to view Anticoagulation Flowsheet and Dosing Calendar (Go to Encounters tab in chart review, and find the Anticoagulation Therapy Visit)    Patient states he had 2 bloody noses about 2 days ago.  Was able to stop bleeding pretty quickly.  Also states extra bruising noted on his arm.  INR Today is 3.3.  Recommended 1 x coumadin reduction then resume normal dosing. If he noted additional bloody noses or increased bruising to let us know right away.  Patient agrees with plan. Dosing based on [facility] Protocol and Provider  directed care plan.      Janneth Allen RN

## 2017-02-28 ENCOUNTER — TELEPHONE (OUTPATIENT)
Dept: FAMILY MEDICINE | Facility: CLINIC | Age: 82
End: 2017-02-28

## 2017-02-28 NOTE — TELEPHONE ENCOUNTER
Reason for Call:  Request for results:    Name of test or procedure: MR Cervical Spine w/o Contrast    Date of test of procedure: 2/22/17    Location of the test or procedure: [facility].    OK to leave the result message on voice mail or with a family member? YES    Phone number Patient can be reached at:  Home number on file [phone] (home)    Additional comments: Hermilo Velasquez called in wishing to have a call back to advise of the results of the above listed procedure. Please call the patient back to advise. Thank you.    Call taken on 2/28/2017 at 12:03 PM by Therese Fall

## 2017-03-01 ENCOUNTER — TRANSFERRED RECORDS (OUTPATIENT)
Dept: HEALTH INFORMATION MANAGEMENT | Facility: CLINIC | Age: 82
End: 2017-03-01

## 2017-03-01 NOTE — TELEPHONE ENCOUNTER
I phoned patient and also spoke with Dr. You.  Dr. You will be setting up physical therapy in his clinic.    Bucky Boone MD

## 2017-03-07 DIAGNOSIS — I65.29 STENOSIS OF CAROTID ARTERY, UNSPECIFIED LATERALITY: ICD-10-CM

## 2017-03-07 RX ORDER — ATORVASTATIN CALCIUM 10 MG/1
10 TABLET, FILM COATED ORAL DAILY
Qty: 90 TABLET | Refills: 3 | Status: SHIPPED | OUTPATIENT
Start: 2017-03-07 | End: 2018-01-10

## 2017-03-09 ENCOUNTER — ANTICOAGULATION THERAPY VISIT (OUTPATIENT)
Dept: NURSING | Facility: CLINIC | Age: 82
End: 2017-03-09
Payer: COMMERCIAL

## 2017-03-09 DIAGNOSIS — Z79.01 LONG-TERM (CURRENT) USE OF ANTICOAGULANTS: ICD-10-CM

## 2017-03-09 DIAGNOSIS — I26.99 PULMONARY EMBOLISM, BILATERAL (H): ICD-10-CM

## 2017-03-09 DIAGNOSIS — I48.0 PAROXYSMAL ATRIAL FIBRILLATION (H): ICD-10-CM

## 2017-03-09 LAB — INR POINT OF CARE: 3.8 (ref 0.86–1.14)

## 2017-03-09 PROCEDURE — 85610 PROTHROMBIN TIME: CPT | Mod: QW

## 2017-03-09 PROCEDURE — 99207 ZZC NO CHARGE NURSE ONLY: CPT

## 2017-03-09 PROCEDURE — 36416 COLLJ CAPILLARY BLOOD SPEC: CPT

## 2017-03-09 NOTE — PROGRESS NOTES
ANTICOAGULATION FOLLOW-UP CLINIC VISIT    Patient Name:  Hermilo Velasquez  Date:  3/9/2017  Contact Type:  Face to Face    SUBJECTIVE:     Patient Findings     Positives Nose bleeds, Bruising    Comments Patient reports of getting bloody nose on Monday and Wednesday this week.  Has complaint of small bruises showing up on arms as well.            OBJECTIVE    INR Protime   Date Value Ref Range Status   03/09/2017 3.8 (A) 0.86 - 1.14 Final       ASSESSMENT / PLAN  INR assessment SUPRA    Recheck INR In: 2 WEEKS    INR Location Clinic      Anticoagulation Summary as of 3/9/2017     INR goal 2.0-3.0   Today's INR 3.8!   Maintenance plan 7.5 mg (5 mg x 1.5) on Mon, Fri; 5 mg (5 mg x 1) all other days   Full instructions 7.5 mg on Mon, Fri; 5 mg all other days   Weekly total 40 mg   Weekly max dose 45 mg   Plan last modified Alisha Meza RN (3/9/2017)   Next INR check 3/22/2017   Priority INR   Target end date Indefinite    Indications   Long-term (current) use of anticoagulants [Z79.01] [Z79.01]  Pulmonary embolism  bilateral (H) [I26.99]  Paroxysmal atrial fibrillation (H) [I48.0]         Anticoagulation Episode Summary     [facility]    Preferred lab     Send INR reminders to [facility] ANTICOAGUL[facility]    Comments       Anticoagulation Care Providers     Provider Role Specialty Phone number    Bucky Boone MD [facility] Internal Medicine [phone]            See the Encounter Report to view Anticoagulation Flowsheet and Dosing Calendar (Go to Encounters tab in chart review, and find the Anticoagulation Therapy Visit)    Dosage adjustment made based on physician directed care plan.  Patient was added on INR schedule due to complaint of nose bleeds.   I asked about how long they take to stop bleeding and he said that they don't go more than 10 minutes.   Small bruises noted on anterior surface of forearms.   INR slightly elevated. Will bring down maintenance dose by 5%.   7.5 mg Mon,  Fri; 5 mg all other days.   Recheck in 2 weeks.    Alisha Meza RN

## 2017-03-09 NOTE — MR AVS SNAPSHOT
Hermilo Velasquez   3/9/2017 2:30 PM   Anticoagulation Therapy Visit    Description:  84 year old male   Provider:   ANTICOAGULATION CLINIC   Department:   Nurse           INR as of 3/9/2017     Today's INR 3.8!      Anticoagulation Summary as of 3/9/2017     INR goal 2.0-3.0   Today's INR 3.8!   Full instructions 7.5 mg on Mon, Fri; 5 mg all other days   Next INR check 3/22/2017    Indications   Long-term (current) use of anticoagulants [Z79.01] [Z79.01]  Pulmonary embolism  bilateral (H) [I26.99]  Paroxysmal atrial fibrillation (H) [I48.0]         Your next Anticoagulation Clinic appointment(s)     Mar 09, 2017  2:30 PM CST   Anticoagulation Visit with  [facility]ULATION CLINIC   [facility]    [address]  [place] [address]   [phone]            Mar 22, 2017 10:00 AM CDT   Anticoagulation Visit with  ANTICOAGULATION CLINIC   [facility]    [address]  [place] [address]   [phone]              Contact Numbers     Clinic Number:         March 2017 Details    Sun Mon Tue Wed Thu Fri Sat        1               2               3               4                 5               6               7               8               9      5 mg   See details      10      7.5 mg         11      5 mg           12      5 mg         13      7.5 mg         14      5 mg         15      5 mg         16      5 mg         17      7.5 mg         18      5 mg           19      5 mg         20      7.5 mg         21      5 mg         22            23               24               25                 26               27               28               29               30               31                 Date Details   03/09 This INR check       Date of next INR:  3/22/2017         How to take your warfarin dose     To take:  5 mg Take 1 of the 5 mg tablets.    To take:  7.5 mg Take 1.5 of the 5 mg tablets.

## 2017-03-16 ENCOUNTER — TELEPHONE (OUTPATIENT)
Dept: FAMILY MEDICINE | Facility: CLINIC | Age: 82
End: 2017-03-16

## 2017-03-16 NOTE — TELEPHONE ENCOUNTER
Reason for Call: Request for an order or referral:    Order or referral being requested: [facility] Test [id number], [id number]     Date needed: as soon as possible    Has the patient been seen by the PCP for this problem? YES    Additional comments: needing to get these test ran     Phone number Patient can be reached at:  [phone] , Ext [phone] Tommy     Best Time:  Anytime     Can we leave a detailed message on this number?  YES    Call taken on 3/16/2017 at 2:47 PM by Fazal Osborne

## 2017-03-17 NOTE — TELEPHONE ENCOUNTER
Spoke to Patrue, she said the wrong provider was called and to disregard the inquiry.   Sophia Rivera MA

## 2017-03-17 NOTE — TELEPHONE ENCOUNTER
I spoke to patient, he said [facility] is asking for these tests.  Dr. Sinha's office. I put a call into Patrue at [facility].     Sophia Rivera MA

## 2017-03-22 ENCOUNTER — ANTICOAGULATION THERAPY VISIT (OUTPATIENT)
Dept: NURSING | Facility: CLINIC | Age: 82
End: 2017-03-22
Payer: COMMERCIAL

## 2017-03-22 DIAGNOSIS — Z79.01 LONG-TERM (CURRENT) USE OF ANTICOAGULANTS: ICD-10-CM

## 2017-03-22 DIAGNOSIS — I26.99 PULMONARY EMBOLISM, BILATERAL (H): ICD-10-CM

## 2017-03-22 DIAGNOSIS — K92.1 GASTROINTESTINAL HEMORRHAGE WITH MELENA: ICD-10-CM

## 2017-03-22 DIAGNOSIS — M79.673 PAIN OF FOOT, UNSPECIFIED LATERALITY: Primary | ICD-10-CM

## 2017-03-22 DIAGNOSIS — I48.0 PAROXYSMAL ATRIAL FIBRILLATION (H): ICD-10-CM

## 2017-03-22 LAB — INR POINT OF CARE: 2.6 (ref 0.86–1.14)

## 2017-03-22 PROCEDURE — 85610 PROTHROMBIN TIME: CPT | Mod: QW

## 2017-03-22 PROCEDURE — 84550 ASSAY OF BLOOD/URIC ACID: CPT

## 2017-03-22 PROCEDURE — 99207 ZZC NO CHARGE NURSE ONLY: CPT

## 2017-03-22 PROCEDURE — 36416 COLLJ CAPILLARY BLOOD SPEC: CPT

## 2017-03-22 RX ORDER — WARFARIN SODIUM 5 MG/1
5-7.5 TABLET ORAL DAILY
Qty: 140 TABLET | Refills: 0 | Status: SHIPPED | OUTPATIENT
Start: 2017-03-22 | End: 2017-08-17

## 2017-03-22 RX ORDER — WARFARIN SODIUM 5 MG/1
5-7.5 TABLET ORAL SEE ADMIN INSTRUCTIONS
Qty: 140 TABLET | Refills: 0 | Status: SHIPPED | OUTPATIENT
Start: 2017-03-22 | End: 2017-03-22

## 2017-03-22 RX ORDER — NICOTINE POLACRILEX 4 MG/1
20 GUM, CHEWING ORAL DAILY
Qty: 90 TABLET | Refills: 1 | Status: SHIPPED | OUTPATIENT
Start: 2017-03-22 | End: 2017-04-03

## 2017-03-22 NOTE — PROGRESS NOTES
ANTICOAGULATION FOLLOW-UP CLINIC VISIT    Patient Name:  Hermilo Velasquez  Date:  3/22/2017  Contact Type:  Face to Face    SUBJECTIVE:     Patient Findings     Positives No Problem Findings           OBJECTIVE    INR Protime   Date Value Ref Range Status   03/22/2017 2.6 (A) 0.86 - 1.14 Final       ASSESSMENT / PLAN  INR assessment THER    Recheck INR In: 4 WEEKS    INR Location Clinic      Anticoagulation Summary as of 3/22/2017     INR goal 2.0-3.0   Today's INR 2.6   Maintenance plan 7.5 mg (5 mg x 1.5) on Mon, Fri; 5 mg (5 mg x 1) all other days   Full instructions 7.5 mg on Mon, Fri; 5 mg all other days   Weekly total 40 mg   Weekly max dose 45 mg   No change documented Lin Hood RN   Plan last modified Alisha Meza RN (3/9/2017)   Next INR check 4/19/2017   Priority INR   Target end date Indefinite    Indications   Long-term (current) use of anticoagulants [Z79.01] [Z79.01]  Pulmonary embolism  bilateral (H) [I26.99]  Paroxysmal atrial fibrillation (H) [I48.0]         Anticoagulation Episode Summary     [facility]    Preferred lab     Send INR reminders to [facility] ANTICOAGULATION    Comments       Anticoagulation Care Providers     Provider Role Specialty Phone number    Bucky Boone MD [facility] Internal Medicine [phone]            See the Encounter Report to view Anticoagulation Flowsheet and Dosing Calendar (Go to Encounters tab in chart review, and find the Anticoagulation Therapy Visit)    Dosage adjustment made based on physician directed care plan.    Lin Hood RN

## 2017-03-22 NOTE — TELEPHONE ENCOUNTER
Dr. Boone,  Patient in to clinic for INR today (2.6).  States he would like to go back on Omeprazole 20 mg daily.  States Omeprazole was put on hold to see if it  was the cause of a rash but he found out it wasn't the medication.  The Ranitidine is not helping, having heartburn since switching.    Note from 1/17/17 OV:  R21) Rash and nonspecific skin eruption (primary encounter diagnosis)  Comment: We will continue to avoid diuretic. We can hold omeprazole and substitute with zantac ( over the counter). I would recommend follow up in dermatology. Refill for hydroxyzine given.    Order T'd up from medication history list.  Thanks, Janett Hood RN

## 2017-03-22 NOTE — MR AVS SNAPSHOT
Hermilo MADRID Eva   3/22/2017 10:00 AM   Anticoagulation Therapy Visit    Description:  84 year old male   Provider:   ANTICOAGULATION CLINIC   Department:  [facility] Nurse           INR as of 3/22/2017     Today's INR 2.6      Anticoagulation Summary as of 3/22/2017     INR goal 2.0-3.0   Today's INR 2.6   Full instructions 7.5 mg on Mon, Fri; 5 mg all other days   Next INR check 4/19/2017    Indications   Long-term (current) use of anticoagulants [Z79.01] [Z79.01]  Pulmonary embolism  bilateral (H) [I26.99]  Paroxysmal atrial fibrillation (H) [I48.0]         Your next Anticoagulation Clinic appointment(s)     Apr 19, 2017 11:00 AM CDT   Anticoagulation Visit with  ANTICOAGULATION CLINIC   [facility] [place] ([facility])    [address]  [place] [address]   [phone]              Contact Numbers     Clinic Number:         March 2017 Details    Sun Mon Tue Wed Thu Fri Sat        1               2               3               4                 5               6               7               8               9               10               11                 12               13               14               15               16               17               18                 19               20               21               22      5 mg   See details      23      5 mg         24      7.5 mg         25      5 mg           26      5 mg         27      7.5 mg         28      5 mg         29      5 mg         30      5 mg         31      7.5 mg           Date Details   03/22 This INR check               How to take your warfarin dose     To take:  5 mg Take 1 of the 5 mg tablets.    To take:  7.5 mg Take 1.5 of the 5 mg tablets.           April 2017 Details    Sun Mon Tue Wed Thu Fri Sat           1      5 mg           2      5 mg         3      7.5 mg         4      5 mg         5      5 mg         6      5 mg         7      7.5 mg         8      5 mg           9      5 mg         10       7.5 mg         11      5 mg         12      5 mg         13      5 mg         14      7.5 mg         15      5 mg           16      5 mg         17      7.5 mg         18      5 mg         19            20               21               22                 23               24               25               26               27               28               29                 30                      Date Details   No additional details    Date of next INR:  4/19/2017         How to take your warfarin dose     To take:  5 mg Take 1 of the 5 mg tablets.    To take:  7.5 mg Take 1.5 of the 5 mg tablets.

## 2017-03-23 LAB — URATE SERPL-MCNC: 4.2 MG/DL (ref 3.5–7.2)

## 2017-03-29 ENCOUNTER — TRANSFERRED RECORDS (OUTPATIENT)
Dept: HEALTH INFORMATION MANAGEMENT | Facility: CLINIC | Age: 82
End: 2017-03-29

## 2017-03-30 ENCOUNTER — TRANSFERRED RECORDS (OUTPATIENT)
Dept: HEALTH INFORMATION MANAGEMENT | Facility: CLINIC | Age: 82
End: 2017-03-30

## 2017-04-03 ENCOUNTER — TRANSFERRED RECORDS (OUTPATIENT)
Dept: HEALTH INFORMATION MANAGEMENT | Facility: CLINIC | Age: 82
End: 2017-04-03

## 2017-04-03 RX ORDER — NICOTINE POLACRILEX 4 MG/1
40 GUM, CHEWING ORAL DAILY
Qty: 180 TABLET | Refills: 1 | Status: SHIPPED | OUTPATIENT
Start: 2017-04-03 | End: 2017-10-14

## 2017-04-03 NOTE — TELEPHONE ENCOUNTER
Routing refill request to provider for review/approval because:  Patient wanting to take higher dose than previously RX'd.      Historically in epic---Omeprazole 20mg daily.   Patient says he's taking 40mg.  If OK---do you want him to take 20mg BID---or 40mg daily?    Please review and complete RX, if OK .     Thank you,  Evie Mckeon RN, BSN

## 2017-04-03 NOTE — TELEPHONE ENCOUNTER
The patient called after picking up his Rx and said that he is supposed to take 2 tablets daily and   That he still gets Heart burn on 1 tablet  He will run out sooner because he is going to take 2 tablets daily

## 2017-04-04 ENCOUNTER — TELEPHONE (OUTPATIENT)
Dept: FAMILY MEDICINE | Facility: CLINIC | Age: 82
End: 2017-04-04

## 2017-04-04 NOTE — TELEPHONE ENCOUNTER
ON 4/3/17, Dr. Prakash signed the Rx for 180 tablets, refill 1.  Called pt and let him know that he should contact the pharmacy to find out why they only dispensed 90    Sophia Rivera MA

## 2017-04-04 NOTE — TELEPHONE ENCOUNTER
Reason for Call:  Medication or medication refill:  [id number] - [place], [place] - [address] [facility]  Do you use a [facility]?  Name of the pharmacy and phone number for the current request:      Name of the medication requested:  omeprazole 20 MG tablet 180 tablet 1 4/3/2017  No   Sig: Take 2 tablets (40 mg) by mouth daily   Class: E-Prescribe   Route: Oral   Order: [id number]   E-Prescribing Status: Receipt confirmed by pharmacy (4/3/2017  5:23 PM CDT)         Other request:  He stated that the [facility] pharmacy only gave him 90 tablets yesterday and he is need of 90 more tablets   He would like a phone call from the nurse letting him know that it was sent to the pharmacy     Can we leave a detailed message on this number? YES    Phone number patient can be reached at: Home number on file [phone] (home)    Best Time: anytime    Call taken on 4/4/2017 at 12:21 PM by Nikki Corcoran

## 2017-04-17 ENCOUNTER — TRANSFERRED RECORDS (OUTPATIENT)
Dept: HEALTH INFORMATION MANAGEMENT | Facility: CLINIC | Age: 82
End: 2017-04-17

## 2017-04-19 ENCOUNTER — ANTICOAGULATION THERAPY VISIT (OUTPATIENT)
Dept: NURSING | Facility: CLINIC | Age: 82
End: 2017-04-19
Payer: COMMERCIAL

## 2017-04-19 DIAGNOSIS — I26.99 PULMONARY EMBOLISM, BILATERAL (H): ICD-10-CM

## 2017-04-19 DIAGNOSIS — Z79.01 LONG-TERM (CURRENT) USE OF ANTICOAGULANTS: ICD-10-CM

## 2017-04-19 DIAGNOSIS — I48.0 PAROXYSMAL ATRIAL FIBRILLATION (H): ICD-10-CM

## 2017-04-19 LAB — INR POINT OF CARE: 3.8 (ref 0.86–1.14)

## 2017-04-19 PROCEDURE — 85610 PROTHROMBIN TIME: CPT | Mod: QW

## 2017-04-19 PROCEDURE — 36416 COLLJ CAPILLARY BLOOD SPEC: CPT

## 2017-04-19 PROCEDURE — 99207 ZZC NO CHARGE NURSE ONLY: CPT

## 2017-04-19 NOTE — PROGRESS NOTES
ANTICOAGULATION FOLLOW-UP CLINIC VISIT    Patient Name:  Hermilo Velasquez  Date:  4/19/2017  Contact Type:  Face to Face    SUBJECTIVE:     Patient Findings     Positives Change in medications (Dermatologist started patient on Methylprednisolone. Took first dose yesterday.  Will take x 5 days   Will taper down through Saturday 4/22)           OBJECTIVE    INR Protime   Date Value Ref Range Status   04/19/2017 3.8 (A) 0.86 - 1.14 Final       ASSESSMENT / PLAN  INR assessment SUPRA    Recheck INR In: 5 DAYS    INR Location Clinic      Anticoagulation Summary as of 4/19/2017     INR goal 2.0-3.0   Today's INR 3.8!   Maintenance plan 7.5 mg (5 mg x 1.5) on Mon, Fri; 5 mg (5 mg x 1) all other days   Full instructions 4/19: Hold; 4/20: 2.5 mg; 4/21: 5 mg; Otherwise 7.5 mg on Mon, Fri; 5 mg all other days   Weekly total 40 mg   Weekly max dose 45 mg   Plan last modified Alisha Meza RN (3/9/2017)   Next INR check 4/24/2017   Priority INR   Target end date Indefinite    Indications   Long-term (current) use of anticoagulants [Z79.01] [Z79.01]  Pulmonary embolism  bilateral (H) [I26.99]  Paroxysmal atrial fibrillation (H) [I48.0]         Anticoagulation Episode Summary     [facility]    Preferred lab     Send INR reminders to [facility]    Comments       Anticoagulation Care Providers     Provider Role Specialty Phone number    Bucky Boone MD Responsible Internal Medicine [phone]            See the Encounter Report to view Anticoagulation Flowsheet and Dosing Calendar (Go to Encounters tab in chart review, and find the Anticoagulation Therapy Visit)    Dosage adjustment made based on physician directed care plan.    Lin Hood RN

## 2017-04-19 NOTE — MR AVS SNAPSHOT
Hermilo Velasquez   4/19/2017 11:00 AM   Anticoagulation Therapy Visit    Description:  84 year old male   Provider:   ANTICOAGULATION CLINIC   Department:   Nurse           INR as of 4/19/2017     Today's INR 3.8!      Anticoagulation Summary as of 4/19/2017     INR goal 2.0-3.0   Today's INR 3.8!   Full instructions 4/19: Hold; 4/20: 2.5 mg; 4/21: 5 mg; Otherwise 7.5 mg on Mon, Fri; 5 mg all other days   Next INR check 4/24/2017    Indications   Long-term (current) use of anticoagulants [Z79.01] [Z79.01]  Pulmonary embolism  bilateral (H) [I26.99]  Paroxysmal atrial fibrillation (H) [I48.0]         Your next Anticoagulation Clinic appointment(s)     Apr 24, 2017 11:00 AM CDT   Anticoagulation Visit with  ANTICOAGULATION CLINIC   [facility]    [address]  [place] [address]   [phone]              Contact Numbers     Clinic Number:         April 2017 Details    Sun Mon Tue Wed Thu Fri Sat           1                 2               3               4               5               6               7               8                 9               10               11               12               13               14               15                 16               17               18               19      Hold   See details      20      2.5 mg         21      5 mg         22      5 mg           23      5 mg         24            25               26               27               28               29                 30                      Date Details   04/19 This INR check       Date of next INR:  4/24/2017         How to take your warfarin dose     To take:  2.5 mg Take 0.5 of a 5 mg tablet.    To take:  5 mg Take 1 of the 5 mg tablets.    To take:  7.5 mg Take 1.5 of the 5 mg tablets.    Hold Do not take your warfarin dose. See the Details table to the right for additional instructions.

## 2017-04-24 ENCOUNTER — TRANSFERRED RECORDS (OUTPATIENT)
Dept: HEALTH INFORMATION MANAGEMENT | Facility: CLINIC | Age: 82
End: 2017-04-24

## 2017-04-24 ENCOUNTER — ANTICOAGULATION THERAPY VISIT (OUTPATIENT)
Dept: NURSING | Facility: CLINIC | Age: 82
End: 2017-04-24
Payer: COMMERCIAL

## 2017-04-24 ENCOUNTER — RADIANT APPOINTMENT (OUTPATIENT)
Dept: GENERAL RADIOLOGY | Facility: CLINIC | Age: 82
End: 2017-04-24
Attending: INTERNAL MEDICINE
Payer: COMMERCIAL

## 2017-04-24 ENCOUNTER — OFFICE VISIT (OUTPATIENT)
Dept: FAMILY MEDICINE | Facility: CLINIC | Age: 82
End: 2017-04-24
Payer: COMMERCIAL

## 2017-04-24 ENCOUNTER — TELEPHONE (OUTPATIENT)
Dept: FAMILY MEDICINE | Facility: CLINIC | Age: 82
End: 2017-04-24

## 2017-04-24 VITALS
HEIGHT: 70 IN | WEIGHT: 182 LBS | SYSTOLIC BLOOD PRESSURE: 140 MMHG | OXYGEN SATURATION: 91 % | DIASTOLIC BLOOD PRESSURE: 83 MMHG | TEMPERATURE: 97.1 F | BODY MASS INDEX: 26.05 KG/M2 | HEART RATE: 55 BPM

## 2017-04-24 DIAGNOSIS — I26.99 PULMONARY EMBOLISM, BILATERAL (H): ICD-10-CM
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
## 2017-04-24 DIAGNOSIS — C85.90 NON-HODGKIN'S LYMPHOMA, UNSPECIFIED BODY REGION, UNSPECIFIED NON-HODGKIN LYMPHOMA TYPE (H): ICD-10-CM

## 2017-04-24 DIAGNOSIS — R31.29 MICROSCOPIC HEMATURIA: ICD-10-CM

## 2017-04-24 DIAGNOSIS — G62.9 NEUROPATHY: ICD-10-CM

## 2017-04-24 DIAGNOSIS — D62 ANEMIA DUE TO BLOOD LOSS, ACUTE: ICD-10-CM

## 2017-04-24 DIAGNOSIS — Z79.01 LONG-TERM (CURRENT) USE OF ANTICOAGULANTS: ICD-10-CM

## 2017-04-24 DIAGNOSIS — S09.90XA HEAD TRAUMA, INITIAL ENCOUNTER: ICD-10-CM

## 2017-04-24 DIAGNOSIS — K21.9 GASTROESOPHAGEAL REFLUX DISEASE WITHOUT ESOPHAGITIS: ICD-10-CM

## 2017-04-24 DIAGNOSIS — I48.0 PAROXYSMAL ATRIAL FIBRILLATION (H): ICD-10-CM

## 2017-04-24 DIAGNOSIS — D12.6 BENIGN NEOPLASM OF COLON, UNSPECIFIED PART OF COLON: Primary | Chronic | ICD-10-CM

## 2017-04-24 LAB — INR POINT OF CARE: 2 (ref 0.86–1.14)

## 2017-04-24 PROCEDURE — 71101 X-RAY EXAM UNILAT RIBS/CHEST: CPT | Mod: RT

## 2017-04-24 PROCEDURE — 99207 ZZC NO CHARGE NURSE ONLY: CPT

## 2017-04-24 PROCEDURE — 36416 COLLJ CAPILLARY BLOOD SPEC: CPT

## 2017-04-24 PROCEDURE — 85610 PROTHROMBIN TIME: CPT | Mod: QW

## 2017-04-24 PROCEDURE — 72072 X-RAY EXAM THORAC SPINE 3VWS: CPT

## 2017-04-24 PROCEDURE — 99215 OFFICE O/P EST HI 40 MIN: CPT | Performed by: INTERNAL MEDICINE

## 2017-04-24 NOTE — TELEPHONE ENCOUNTER
Reason for Call:  Other appointment    Detailed comments: Hermilo Velasquez called in requesting a same day appointment with his PCP after a fall he had last Friday. Please be advised. Thank you.    Phone Number Patient can be reached at: Home number on file [phone] (home)    Best Time: Before his 1pm dentist appointment     Can we leave a detailed message on this number? YES    Call taken on 4/24/2017 at 9:46 AM by Therese Fall

## 2017-04-24 NOTE — PROGRESS NOTES
SUBJECTIVE:                                                    Hermilo Velasquez is a 84 year old male who presents to clinic today for the following health issues:    Chief Complaint   Patient presents with     Fall     fell on Friday in a parking lot     Patient with history of neuropathy presents to the clinic to follow up on a fall he had on Friday. He states that he was wheeling palettes around to load them onto his truck, when he states that he tripped on something and fell flat on his back and also struck the back of his head. Patient states that a couple hours later he had nausea but that has resolved. He denies losing consciousness, dizziness, confusion, and headaches. He states since the fall has achiness in his right side under the axilla that radiates down to the middle of his back. He states that he took percocet for his pain. Patient states that he does have numbness and tingling in his legs that radiates to his feet, but that it is the same as normal.    Medications reconciled and are unchanged.    Problem list and histories reviewed & adjusted, as indicated.  Additional history: as documented    Current Outpatient Prescriptions   Medication Sig Dispense Refill     omeprazole 20 MG tablet Take 2 tablets (40 mg) by mouth daily 180 tablet 1     warfarin (COUMADIN) 5 MG tablet Take 1-1.5 tablets (5-7.5 mg) by mouth daily Take as prescribed by INR Clinic.  Current dose is 7.5 mg x 2 days and 5 mg x 5 days 140 tablet 0     atorvastatin (LIPITOR) 10 MG tablet Take 1 tablet (10 mg) by mouth daily 90 tablet 3     cyclobenzaprine (FLEXERIL) 10 MG tablet Take 1 tablet (10 mg) by mouth 3 times daily as needed for muscle spasms 90 tablet 0     ranitidine (ZANTAC) 150 MG tablet Take 1 tablet (150 mg) by mouth 2 times daily 60 tablet 3     cetirizine (ZYRTEC) 10 MG tablet Take 1 tablet (10 mg) by mouth every evening 30 tablet 11     triamcinolone (KENALOG) 0.1 % cream Apply topically 2 times daily        "hydrOXYzine (ATARAX) 25 MG tablet Take 1-2 tablets (25-50 mg) by mouth every 6 hours as needed for itching 120 tablet 3     metoprolol (TOPROL-XL) 100 MG 24 hr tablet Take 1 tablet (100 mg) by mouth daily 180 tablet 3     fluticasone (FLONASE) 50 MCG/ACT nasal spray Spray 1 spray into both nostrils daily       sodium chloride (OCEAN) 0.65 % nasal spray Spray 1 spray into both nostrils daily       Allergies   Allergen Reactions     Lasix [Furosemide] Rash     Penicillins Rash     \"broke out from injection\" 60 yrs ago         Reviewed and updated as needed this visit by clinical staff  Tobacco  Allergies  Meds  Soc Hx      ROS:  Constitutional, HEENT, cardiovascular, pulmonary, gi and gu systems are negative, except as otherwise noted.    This document serves as a record of the services and decisions personally performed and made by Willy Harvey MD. It was created on his/her behalf by Pam Aragon, a trained medical scribe. The creation of this document is based the provider's statements to the medical scribe.  Scribpuja Aragon 1:12 PM, April 24, 2017    OBJECTIVE:                                                    /83  Pulse 55  Temp 97.1  F (36.2  C)  Ht 1.778 m (5' 10\")  Wt 82.6 kg (182 lb)  SpO2 91%  BMI 26.11 kg/m2  Body mass index is 26.11 kg/(m^2).   INR 2.0    M[employer], N.B. Previous cataracts surgery, pupils relatively equal, but not quite normal, posterior occipital silver dollar sized contusion, skull was palpably non tender  Neck was supple without adenopathy or thyromegaly his carotids were normal without bruits  Chest clear to auscultation and percussion, tenderness in about the right third rib area  Cardiovascular S1 and S2 are irregularly irregular without murmurs or gallops  Abdomen bowel sounds were normal.  There is no palpable mass or organomegaly  Back he has point tenderness at approximately T12  Deep tendon reflexes patellar normal, achilles were absent, " babinskis were down going  Romberg was negative  Extremities nontender without any edema, contusion of the right knee and left great toe  Pulses pedal pulses are as described otherwise his pulses are bilaterally symmetrical throughout without bruits  Skin without significant abnormality    Diagnostic Test Results:  Results for orders placed or performed in visit on 04/24/17 (from the past 24 hour(s))   INR point of care   Result Value Ref Range    INR Protime 2.0 (A) 0.86 - 1.14     Xray - Preliminary reports negative, CT scan plan for this evening     ASSESSMENT/PLAN:                                                    1. Benign neoplasm of colon, unspecified part of colon    2. Pulmonary embolism, bilateral (H)    3. Paroxysmal atrial fibrillation (H)  - INR    4. Neuropathy (H)  No significant change    5. Non-Hodgkin's lymphoma, unspecified body region, unspecified non-Hodgkin lymphoma type (H)    6. Gastroesophageal reflux disease without esophagitis    7. Microscopic hematuria    8. Anemia due to blood loss, acute    9. Head trauma, initial encounter  - CT Head w/o Contrast; Future    10. Fall, initial encounter  - XR Ribs & Chest Right G/E 3 Views; Future  - XR Thoracic Spine 3 Views; Future    Patient is receiving    The information in this document, created by the medical scribe for me, accurately reflects the services I personally performed and the decisions made by me. I have reviewed and approved this document for accuracy prior to leaving the patient care area.  Willy Harvey MD  11:55 AM, 04/24/17    Willy Harvey MD  [facility]

## 2017-04-24 NOTE — PROGRESS NOTES
ANTICOAGULATION FOLLOW-UP CLINIC VISIT    Patient Name:  Hermilo Velasquez  Date:  4/24/2017  Contact Type:  Face to Face    SUBJECTIVE:     Patient Findings     Positives No Problem Findings    Comments Patient had a recent fall.  Was not seen in ER.  Seeing provider today.  Denies signs of bleeding/bruising.           OBJECTIVE    INR Protime   Date Value Ref Range Status   04/24/2017 2.0 (A) 0.86 - 1.14 Final       ASSESSMENT / PLAN  INR assessment THER    Recheck INR In: 2 WEEKS    INR Location Clinic      Anticoagulation Summary as of 4/24/2017     INR goal 2.0-3.0   Today's INR 2.0   Maintenance plan 7.5 mg (5 mg x 1.5) on Mon, Fri; 5 mg (5 mg x 1) all other days   Full instructions 7.5 mg on Mon, Fri; 5 mg all other days   Weekly total 40 mg   Weekly max dose 45 mg   No change documented Janneth Allen RN   Plan last modified Alisha Meza RN (3/9/2017)   Next INR check 5/8/2017   Priority INR   Target end date Indefinite    Indications   Long-term (current) use of anticoagulants [Z79.01] [Z79.01]  Pulmonary embolism  bilateral (H) [I26.99]  Paroxysmal atrial fibrillation (H) [I48.0]         Anticoagulation Episode Summary     INR check location [facility]    Preferred lab     Send INR reminders to [facility]    Comments       Anticoagulation Care Providers     Provider Role Specialty Phone number    LisaajBucky douglas MD [facility] Internal Medicine [phone]            See the Encounter Report to view Anticoagulation Flowsheet and Dosing Calendar (Go to Encounters tab in chart review, and find the Anticoagulation Therapy Visit)    Recent fall. Denies signs of bruising or bleeding.  Hurt shoulder and right ribs. Seeing provider today.  Dosing based on [facility] Protocol and Provider directed care plan.      Janneth Allen, RN

## 2017-04-24 NOTE — MR AVS SNAPSHOT
Hermilo MADRID Eva   4/24/2017 11:00 AM   Anticoagulation Therapy Visit    Description:  84 year old male   Provider:   ANTICOAGULATION CLINIC   Department:  Cs Nurse           INR as of 4/24/2017     Today's INR 2.0      Anticoagulation Summary as of 4/24/2017     INR goal 2.0-3.0   Today's INR 2.0   Full instructions 7.5 mg on Mon, Fri; 5 mg all other days   Next INR check 5/8/2017    Indications   Long-term (current) use of anticoagulants [Z79.01] [Z79.01]  Pulmonary embolism  bilateral (H) [I26.99]  Paroxysmal atrial fibrillation (H) [I48.0]         Your next Anticoagulation Clinic appointment(s)     May 08, 2017 11:15 AM CDT   Anticoagulation Visit with  ANTICOAGULATION CLINIC   [facility] ([facility])    [address]  [place] [address]   [phone]              Contact Numbers     Clinic Number:         April 2017 Details    Sun Mon Tue Wed Thu Fri Sat           1                 2               3               4               5               6               7               8                 9               10               11               12               13               14               15                 16               17               18               19               20               21               22                 23               24      7.5 mg   See details      25      5 mg         26      5 mg         27      5 mg         28      7.5 mg         29      5 mg           30      5 mg                Date Details   04/24 This INR check               How to take your warfarin dose     To take:  5 mg Take 1 of the 5 mg tablets.    To take:  7.5 mg Take 1.5 of the 5 mg tablets.           May 2017 Details    Sun Mon Tue Wed Thu Fri Sat      1      7.5 mg         2      5 mg         3      5 mg         4      5 mg         5      7.5 mg         6      5 mg           7      5 mg         8            9               10               11               12                13                 14               15               16               17               18               19               20                 21               22               23               24               25               26               27                 28               29               30               31                   Date Details   No additional details    Date of next INR:  5/8/2017         How to take your warfarin dose     To take:  5 mg Take 1 of the 5 mg tablets.    To take:  7.5 mg Take 1.5 of the 5 mg tablets.

## 2017-04-24 NOTE — TELEPHONE ENCOUNTER
On Friday:  Pt was Loading stuff on pickup. Tripped, went right backwards, hit head, had a couple of cuts. Pt on coumadin  Shoulder blade is painful today, but denies any other sx such as blurred vision, n/v, headache, confusion,etc.  Did have nausea for 2 hours immediately after injury on Friday.   Today sx reported: pain in right shoulder.  Head has scabs on it.    Pt reports did not know he needed to go to ER right after fall. Informed pt of this and pt agrees for future falls to go to ER for eval.    PCP - Pt has INR at 11am, would you want to work pt in for fall follow up after this?  Please advise.  Dione Giron RN

## 2017-04-24 NOTE — MR AVS SNAPSHOT
After Visit Summary   4/24/2017    Hermilo Velasquez    MRN: [id number]           Patient Information     Date Of Birth          11/3/1932        Visit Information        Provider Department      4/24/2017 11:30 AM Willy Harvey MD [facility]        Today's Diagnoses     Benign neoplasm of colon, unspecified part of colon    -  1    Pulmonary embolism, bilateral (H)        Paroxysmal atrial fibrillation (H)        Neuropathy (H)        Non-Hodgkin's lymphoma, unspecified body region, unspecified non-Hodgkin lymphoma type (H)        Gastroesophageal reflux disease without esophagitis        Microscopic hematuria        Anemia due to blood loss, acute        Head trauma, initial encounter        Fall, initial encounter           Follow-ups after your visit        Your next 10 appointments already scheduled     Apr 25, 2017  7:30 AM CDT   CT HEAD W/O CONTRAST with SHCT1   [facility] ([facility])    [address] [facility] [id number] [phone]           Please bring any scans or X-rays taken at other hospitals, if similar tests were done. Also bring a list of your medicines, including vitamins, minerals and over-the-counter drugs. It is safest to leave personal items at home.  Be sure to tell your doctor:   If you have any allergies.   If there s any chance you are pregnant.   If you are breastfeeding.   If you have any special needs.  You do not need to do anything special to prepare.  Please wear loose clothing, such as a sweat suit or jogging clothes. Avoid snaps, zippers and other metal. We may ask you to undress and put on a hospital gown.            May 08, 2017 11:15 AM CDT   Anticoagulation Visit with  ANTICOAGULATION CLINIC   [facility] ([facility])    [address]  [facility] [id number] [phone]              Future tests that were ordered for you today     Open Future Orders        Priority Expected  "Expires Ordered    CT Head w/o Contrast ASAP  2018            Who to contact     If you have questions or need follow up information about today's clinic visit or your schedule please contact [facility] directly at [phone].  Normal or non-critical lab and imaging results will be communicated to you by MyChart, letter or phone within 4 business days after the clinic has received the results. If you do not hear from us within 7 days, please contact the clinic through MyChart or phone. If you have a critical or abnormal lab result, we will notify you by phone as soon as possible.  Submit refill requests through [employer] or call your pharmacy and they will forward the refill request to us. Please allow 3 business days for your refill to be completed.          Additional Information About Your Visit        MyChart Information     [employer] lets you send messages to your doctor, view your test results, renew your prescriptions, schedule appointments and more. To sign up, go to [email or web address] . Click on \"Log in\" on the left side of the screen, which will take you to the Welcome page. Then click on \"Sign up Now\" on the right side of the page.     You will be asked to enter the access code listed below, as well as some personal information. Please follow the directions to create your username and password.     Your access code is: [email or web address]  Expires: 2017  4:40 AM     Your access code will  in 90 days. If you need help or a new code, please call your [facility] or [phone].        Care EveryWhere ID     This is your Care EveryWhere ID. This could be used by other organizations to access your [place] medical records  [id number]        Your Vitals Were     Pulse Temperature Height Pulse Oximetry BMI (Body Mass Index)       55 97.1  F (36.2  C) 5' 10\" (1.778 m) 91% 26.11 kg/m2        Blood Pressure from Last 3 Encounters:   17 140/83   17 147/81 "   02/13/17 110/60    Weight from Last 3 Encounters:   04/24/17 182 lb (82.6 kg)   02/17/17 185 lb (83.9 kg)   02/13/17 180 lb (81.6 kg)               Primary Care Provider Office Phone # Fax #    Bucky Boone [phone]       [facility] [address]  [facility] [address]        Thank you!     Thank you for choosing [facility]  for your care. Our goal is always to provide you with excellent care. Hearing back from our patients is one way we can continue to improve our services. Please take a few minutes to complete the written survey that you may receive in the mail after your visit with us. Thank you!             Your Updated Medication List - Protect others around you: Learn how to safely use, store and throw away your medicines at www.disposemymeds.org.          This list is accurate as of: 4/24/17 11:59 PM.  Always use your most recent med list.                   Brand Name Dispense Instructions for use    atorvastatin 10 MG tablet    LIPITOR    90 tablet    Take 1 tablet (10 mg) by mouth daily       cetirizine 10 MG tablet    zyrTEC    30 tablet    Take 1 tablet (10 mg) by mouth every evening       cyclobenzaprine 10 MG tablet    FLEXERIL    90 tablet    Take 1 tablet (10 mg) by mouth 3 times daily as needed for muscle spasms       fluticasone 50 MCG/ACT spray    FLONASE     Spray 1 spray into both nostrils daily       hydrOXYzine 25 MG tablet    ATARAX    120 tablet    Take 1-2 tablets (25-50 mg) by mouth every 6 hours as needed for itching       metoprolol 100 MG 24 hr tablet    TOPROL-XL    180 tablet    Take 1 tablet (100 mg) by mouth daily       omeprazole 20 MG tablet     180 tablet    Take 2 tablets (40 mg) by mouth daily       ranitidine 150 MG tablet    ZANTAC    60 tablet    Take 1 tablet (150 mg) by mouth 2 times daily       sodium chloride 0.65 % nasal spray    OCEAN     Spray 1 spray into both nostrils daily       triamcinolone 0.1 % cream     KENALOG     Apply topically 2 times daily       warfarin 5 MG tablet    COUMADIN    140 tablet    Take 1-1.5 tablets (5-7.5 mg) by mouth daily Take as prescribed by INR Clinic.  Current dose is 7.5 mg x 2 days and 5 mg x 5 days

## 2017-04-25 ENCOUNTER — TELEPHONE (OUTPATIENT)
Dept: FAMILY MEDICINE | Facility: CLINIC | Age: 82
End: 2017-04-25

## 2017-04-25 NOTE — TELEPHONE ENCOUNTER
Follow-up reports: CT of the head showed no osseous changes no evidence of subdural or any other pathological processes.  Patient was warned to continue to follow for changing signs or symptoms to report if he develops any new changes.

## 2017-05-05 ENCOUNTER — TRANSFERRED RECORDS (OUTPATIENT)
Dept: HEALTH INFORMATION MANAGEMENT | Facility: CLINIC | Age: 82
End: 2017-05-05

## 2017-05-05 ENCOUNTER — ANTICOAGULATION THERAPY VISIT (OUTPATIENT)
Dept: NURSING | Facility: CLINIC | Age: 82
End: 2017-05-05
Payer: COMMERCIAL

## 2017-05-05 DIAGNOSIS — Z79.01 LONG-TERM (CURRENT) USE OF ANTICOAGULANTS: ICD-10-CM

## 2017-05-05 DIAGNOSIS — I26.99 PULMONARY EMBOLISM, BILATERAL (H): ICD-10-CM

## 2017-05-05 DIAGNOSIS — I48.0 PAROXYSMAL ATRIAL FIBRILLATION (H): ICD-10-CM

## 2017-05-05 LAB — INR POINT OF CARE: 4.1 (ref 0.86–1.14)

## 2017-05-05 PROCEDURE — 99207 ZZC NO CHARGE NURSE ONLY: CPT

## 2017-05-05 PROCEDURE — 36416 COLLJ CAPILLARY BLOOD SPEC: CPT

## 2017-05-05 PROCEDURE — 85610 PROTHROMBIN TIME: CPT | Mod: QW

## 2017-05-05 NOTE — PROGRESS NOTES
ANTICOAGULATION FOLLOW-UP CLINIC VISIT    Patient Name:  Hermilo Velasquez  Date:  5/5/2017  Contact Type:  Face to Face    SUBJECTIVE:     Patient Findings     Positives Change in medications, Bruising, Inflammation, Antibiotic use or infection    Comments Patient is currently seeing Dermatology for a skin rash and has been on Prednisone and an antibiotic he doesn't know the name of. Has some bruising on left arm a bit more than normal but states he hit his arm on the cupboard. Asked him to get notes from Dermatology for us to review medication changes.           OBJECTIVE    INR Protime   Date Value Ref Range Status   05/05/2017 4.1 (A) 0.86 - 1.14 Final       ASSESSMENT / PLAN  INR assessment SUPRA    Recheck INR In: 10 DAYS    INR Location Clinic      Anticoagulation Summary as of 5/5/2017     INR goal 2.0-3.0   Today's INR 4.1!   Maintenance plan 7.5 mg (5 mg x 1.5) on Mon; 5 mg (5 mg x 1) all other days   Full instructions 5/5: Hold; Otherwise 7.5 mg on Mon; 5 mg all other days   Weekly total 37.5 mg   Weekly max dose 45 mg   Plan last modified Mireya Otto, RN (5/5/2017)   Next INR check 5/15/2017   Priority INR   Target end date Indefinite    Indications   Long-term (current) use of anticoagulants [Z79.01] [Z79.01]  Pulmonary embolism  bilateral (H) [I26.99]  Paroxysmal atrial fibrillation (H) [I48.0]         Anticoagulation Episode Summary     INR check location [facility]    Preferred lab     Send INR reminders to [facility] ANTICOAG[facility]    Comments       Anticoagulation Care Providers     Provider Role Specialty Phone number    Bucky Boone MD [facility] Internal Medicine [phone]            See the Encounter Report to view Anticoagulation Flowsheet and Dosing Calendar (Go to Encounters tab in chart review, and find the Anticoagulation Therapy Visit)    Patient is currently seeing Dermatology for a skin rash and has been on Prednisone and an antibiotic he doesn't know the name of.  Has some bruising on left arm a bit more than normal but states he hit his arm on the cupboard. Asked him to get notes from Dermatology for us to review medication changes.    Dosage adjustment made based on physician directed care plan.    Mireya Otto RN

## 2017-05-05 NOTE — MR AVS SNAPSHOT
Hermilo MADRID Eva   5/5/2017 9:15 AM   Anticoagulation Therapy Visit    Description:  84 year old male   Provider:   ANTICOAGULATION CLINIC   Department:   Nurse           INR as of 5/5/2017     Today's INR 4.1!      Anticoagulation Summary as of 5/5/2017     INR goal 2.0-3.0   Today's INR 4.1!   Full instructions 5/5: Hold; Otherwise 7.5 mg on Mon; 5 mg all other days   Next INR check 5/15/2017    Indications   Long-term (current) use of anticoagulants [Z79.01] [Z79.01]  Pulmonary embolism  bilateral (H) [I26.99]  Paroxysmal atrial fibrillation (H) [I48.0]         Your next Anticoagulation Clinic appointment(s)     May 15, 2017 11:00 AM CDT   Anticoagulation Visit with  ANTICOAGULATION CLINIC   [facility] [place] ([facility])    [address]  [place] [address]   [phone]              Contact Numbers     Clinic Number:         May 2017 Details    Sun Mon Tue Wed Thu Fri Sat      1               2               3               4               5      Hold   See details      6      5 mg           7      5 mg         8      7.5 mg         9      5 mg         10      5 mg         11      5 mg         12      5 mg         13      5 mg           14      5 mg         15            16               17               18               19               20                 21               22               23               24               25               26               27                 28               29               30               31                   Date Details   05/05 This INR check       Date of next INR:  5/15/2017         How to take your warfarin dose     To take:  5 mg Take 1 of the 5 mg tablets.    To take:  7.5 mg Take 1.5 of the 5 mg tablets.    Hold Do not take your warfarin dose. See the Details table to the right for additional instructions.

## 2017-05-09 ENCOUNTER — TELEPHONE (OUTPATIENT)
Dept: FAMILY MEDICINE | Facility: CLINIC | Age: 82
End: 2017-05-09

## 2017-05-09 NOTE — TELEPHONE ENCOUNTER
Reason for Call:  Prior Auth    Detailed comments: Pt states when he went to  Prednisone  He was charged full price- which he paid.     [phone] [facility] phone number for override    Phone Number Patient can be reached at: [phone]    Best Time: anytime    Can we leave a detailed message on this number? YES    Call taken on 5/9/2017 at 11:19 AM by Sophia Sweeney

## 2017-05-10 NOTE — TELEPHONE ENCOUNTER
I do not see we prescribed this for him recently, in fact the last time it was prescribed was back in Nov 2016 for 5 tablets. I left a detailed message for the patient to call me back on this.    Valentin Brian, CMA

## 2017-05-18 ENCOUNTER — ANTICOAGULATION THERAPY VISIT (OUTPATIENT)
Dept: NURSING | Facility: CLINIC | Age: 82
End: 2017-05-18
Payer: COMMERCIAL

## 2017-05-18 DIAGNOSIS — I48.0 PAROXYSMAL ATRIAL FIBRILLATION (H): ICD-10-CM

## 2017-05-18 DIAGNOSIS — Z79.01 LONG-TERM (CURRENT) USE OF ANTICOAGULANTS: ICD-10-CM

## 2017-05-18 DIAGNOSIS — I26.99 PULMONARY EMBOLISM, BILATERAL (H): ICD-10-CM

## 2017-05-18 LAB — INR POINT OF CARE: 5.3 (ref 0.86–1.14)

## 2017-05-18 PROCEDURE — 36416 COLLJ CAPILLARY BLOOD SPEC: CPT

## 2017-05-18 PROCEDURE — 85610 PROTHROMBIN TIME: CPT | Mod: QW

## 2017-05-18 PROCEDURE — 99207 ZZC NO CHARGE NURSE ONLY: CPT

## 2017-05-18 NOTE — MR AVS SNAPSHOT
Hermilo Velasquez   5/18/2017 11:00 AM   Anticoagulation Therapy Visit    Description:  84 year old male   Provider:   ANTICOAGULATION CLINIC   Department:   Nurse           INR as of 5/18/2017     Today's INR 5.3!      Anticoagulation Summary as of 5/18/2017     INR goal 2.0-3.0   Today's INR 5.3!   Full instructions 5/18: Hold; 5/22: 5 mg; Otherwise 7.5 mg on Mon; 5 mg all other days   Next INR check 5/26/2017    Indications   Long-term (current) use of anticoagulants [Z79.01] [Z79.01]  Pulmonary embolism  bilateral (H) [I26.99]  Paroxysmal atrial fibrillation (H) [I48.0]         Your next Anticoagulation Clinic appointment(s)     May 26, 2017 10:30 AM CDT   Anticoagulation Visit with  ANTICOAGULATION CLINIC   [facility] [place] ([facility])    [address]  [place] [address]   [phone]              Contact Numbers     Clinic Number:         May 2017 Details    Sun Mon Tue Wed Thu Fri Sat      1               2               3               4               5               6                 7               8               9               10               11               12               13                 14               15               16               17               18      Hold   See details      19      5 mg         20      5 mg           21      5 mg         22      5 mg         23      5 mg         24      5 mg         25      5 mg         26            27                 28               29               30               31                   Date Details   05/18 This INR check       Date of next INR:  5/26/2017         How to take your warfarin dose     To take:  5 mg Take 1 of the 5 mg tablets.    Hold Do not take your warfarin dose. See the Details table to the right for additional instructions.

## 2017-05-18 NOTE — PROGRESS NOTES
ANTICOAGULATION FOLLOW-UP CLINIC VISIT    Patient Name:  Hermilo Velasquez  Date:  5/18/2017  Contact Type:  Face to Face    SUBJECTIVE:     Patient Findings     Positives Bruising    Comments Patient reports dermatology RX'd Methylprednisonlone x 14 days: has1 day remaining:  Concurrent use of METHYLPREDNISOLONE and WARFARIN may result in increased risk of bleeding or diminished effects of warfarin.  Taking for itchy skin.   Derm Dx:  Bullous Pemphisoid  Fairly new RX---Omeprazole:  Concurrent use of WARFARIN and OMEPRAZOLE may result in elevations of International Normalized Ratio serum values and potentiation of anticoagulant effects           OBJECTIVE    INR Protime   Date Value Ref Range Status   05/18/2017 5.3 (A) 0.86 - 1.14 Final       ASSESSMENT / PLAN  INR assessment SUPRA    Recheck INR In: 8 DAYS    INR Location Clinic      Anticoagulation Summary as of 5/18/2017     INR goal 2.0-3.0   Today's INR 5.3!   Maintenance plan 7.5 mg (5 mg x 1.5) on Mon; 5 mg (5 mg x 1) all other days   Full instructions 5/18: Hold; 5/22: 5 mg; Otherwise 7.5 mg on Mon; 5 mg all other days   Weekly total 37.5 mg   Weekly max dose 45 mg   Plan last modified Mireya Otto, RN (5/5/2017)   Next INR check 5/26/2017   Priority INR   Target end date Indefinite    Indications   Long-term (current) use of anticoagulants [Z79.01] [Z79.01]  Pulmonary embolism  bilateral (H) [I26.99]  Paroxysmal atrial fibrillation (H) [I48.0]         Anticoagulation Episode Summary     [facility]    Preferred lab     Send INR reminders to  ANTICOAGULATION    Comments       Anticoagulation Care Providers     Provider Role Specialty Phone number    Bucky Boone MD [facility] Internal Medicine [phone]            See the Encounter Report to view Anticoagulation Flowsheet and Dosing Calendar (Go to Encounters tab in chart review, and find the Anticoagulation Therapy Visit)    Dosage adjustment made based on  physician directed care plan.    Evie Mckeon RN

## 2017-05-25 ENCOUNTER — TELEPHONE (OUTPATIENT)
Dept: FAMILY MEDICINE | Facility: CLINIC | Age: 82
End: 2017-05-25

## 2017-05-25 ENCOUNTER — ANTICOAGULATION THERAPY VISIT (OUTPATIENT)
Dept: NURSING | Facility: CLINIC | Age: 82
End: 2017-05-25
Payer: COMMERCIAL

## 2017-05-25 DIAGNOSIS — Z79.01 LONG-TERM (CURRENT) USE OF ANTICOAGULANTS: ICD-10-CM

## 2017-05-25 DIAGNOSIS — I26.99 PULMONARY EMBOLISM, BILATERAL (H): ICD-10-CM

## 2017-05-25 DIAGNOSIS — I48.0 PAROXYSMAL ATRIAL FIBRILLATION (H): ICD-10-CM

## 2017-05-25 LAB — INR POINT OF CARE: 3.8 (ref 0.86–1.14)

## 2017-05-25 PROCEDURE — 85610 PROTHROMBIN TIME: CPT | Mod: QW

## 2017-05-25 PROCEDURE — 99207 ZZC NO CHARGE NURSE ONLY: CPT

## 2017-05-25 PROCEDURE — 36416 COLLJ CAPILLARY BLOOD SPEC: CPT

## 2017-05-25 RX ORDER — MYCOPHENOLATE MOFETIL 500 MG/1
500 TABLET ORAL 4 TIMES DAILY
Status: CANCELLED | COMMUNITY
Start: 2017-05-25

## 2017-05-25 RX ORDER — MYCOPHENOLATE MOFETIL 500 MG/1
500 TABLET ORAL 4 TIMES DAILY
COMMUNITY
Start: 2017-05-25 | End: 2018-01-10

## 2017-05-25 NOTE — PROGRESS NOTES
"  ANTICOAGULATION FOLLOW-UP CLINIC VISIT    Patient Name:  Hermilo Velasquez  Date:  5/25/2017  Contact Type:  Face to Face    SUBJECTIVE:     Patient Findings     Comments See anticoag note from 5-18-17:  Patient has continued to take Prednisone from derm for itchy skin.  States he'll cont for \"at least another month\".    Patient reports also taking another RX from Derm---500mg QID---however doesn't know name of that med.    Patient also complaining of increase back pain today---however reports chronic problem.    Preferred to have INR checked again x 1 wk---due to decrease in Warfarin dose going forward for now.  Patient will be out of town.  Scheduled next INR x 10 days from now when back in town.              OBJECTIVE    INR Protime   Date Value Ref Range Status   05/25/2017 3.8 (A) 0.86 - 1.14 Final       ASSESSMENT / PLAN  INR assessment SUPRA    Recheck INR In: 10 DAYS    INR Location Clinic      Anticoagulation Summary as of 5/25/2017     INR goal 2.0-3.0   Today's INR 3.8!   Maintenance plan 5 mg (5 mg x 1) on Mon, Wed, Fri; 2.5 mg (5 mg x 0.5) all other days   Full instructions 5 mg on Mon, Wed, Fri; 2.5 mg all other days   Weekly total 25 mg   Weekly max dose 45 mg   Plan last modified Evie Mckeon RN (5/25/2017)   Next INR check 6/6/2017   Priority INR   Target end date Indefinite    Indications   Long-term (current) use of anticoagulants [Z79.01] [Z79.01]  Pulmonary embolism  bilateral (H) [I26.99]  Paroxysmal atrial fibrillation (H) [I48.0]         Anticoagulation Episode Summary     [facility]    Preferred lab     Send INR reminders to [facility] ANT[facility]AG[facility]    Comments       Anticoagulation Care Providers     Provider Role Specialty Phone number    StephanieramoneBucky douglas MD Responsible Internal Medicine [phone]            See the Encounter Report to view Anticoagulation Flowsheet and Dosing Calendar (Go to Encounters tab in chart review, and find the Anticoagulation " Therapy Visit)    Dosage adjustment made based on physician directed care plan.    Evie Mckeon RN

## 2017-05-25 NOTE — TELEPHONE ENCOUNTER
Called and left message for patient, thanking him for medication information.   Epic med list updated.    Per [employer]---no interaction between Warfarin and Mycophenolate .      Evie Mckeon RN, BSN

## 2017-05-25 NOTE — TELEPHONE ENCOUNTER
Reason for Call:  Other FYI    Detailed comments: Patient calling to give information on medication Mycophenolate 500 mg 4 times per day    Phone Number Patient can be reached at: Home number on file [phone] (home)    Best Time: anytime    Can we leave a detailed message on this number? YES    Call taken on 5/25/2017 at 11:17 AM by Sherley Flores

## 2017-05-25 NOTE — MR AVS SNAPSHOT
Hermilo MADRID Eva   5/25/2017 10:15 AM   Anticoagulation Therapy Visit    Description:  84 year old male   Provider:   ANTICOAGULATION CLINIC   Department:  [facility] Nurse           INR as of 5/25/2017     Today's INR 3.8!      Anticoagulation Summary as of 5/25/2017     INR goal 2.0-3.0   Today's INR 3.8!   Full instructions 5 mg on Mon, Wed, Fri; 2.5 mg all other days   Next INR check 6/6/2017    Indications   Long-term (current) use of anticoagulants [Z79.01] [Z79.01]  Pulmonary embolism  bilateral (H) [I26.99]  Paroxysmal atrial fibrillation (H) [I48.0]         Your next Anticoagulation Clinic appointment(s)     Jun 06, 2017 11:30 AM CDT   Anticoagulation [facility] with  [facility] CLINIC   [facility]    [address]  [place] [address]   [phone]              Contact Numbers     Clinic Number:         May 2017 Details    Sun Mon Tue Wed Thu Fri Sat      1               2               3               4               5               6                 7               8               9               10               11               12               13                 14               15               16               17               18               19               20                 21               22               23               24               25      2.5 mg   See details      26      5 mg         27      2.5 mg           28      2.5 mg         29      5 mg         30      2.5 mg         31      5 mg             Date Details   05/25 This INR check               How to take your warfarin dose     To take:  2.5 mg Take 0.5 of a 5 mg tablet.    To take:  5 mg Take 1 of the 5 mg tablets.           June 2017 Details    Sun Mon Tue Wed Thu Fri Sat         1      2.5 mg         2      5 mg         3      2.5 mg           4      2.5 mg         5      5 mg         6            7               8               9               10                 11               12                13               14               15               16               17                 18               19               20               21               22               23               24                 25               26               27               28               29               30                 Date Details   No additional details    Date of next INR:  6/6/2017         How to take your warfarin dose     To take:  2.5 mg Take 0.5 of a 5 mg tablet.    To take:  5 mg Take 1 of the 5 mg tablets.

## 2017-06-06 ENCOUNTER — RADIANT APPOINTMENT (OUTPATIENT)
Dept: GENERAL RADIOLOGY | Facility: CLINIC | Age: 82
End: 2017-06-06
Attending: INTERNAL MEDICINE
Payer: COMMERCIAL

## 2017-06-06 ENCOUNTER — OFFICE VISIT (OUTPATIENT)
Dept: FAMILY MEDICINE | Facility: CLINIC | Age: 82
End: 2017-06-06
Payer: COMMERCIAL

## 2017-06-06 ENCOUNTER — ANTICOAGULATION THERAPY VISIT (OUTPATIENT)
Dept: NURSING | Facility: CLINIC | Age: 82
End: 2017-06-06
Payer: COMMERCIAL

## 2017-06-06 ENCOUNTER — TELEPHONE (OUTPATIENT)
Dept: FAMILY MEDICINE | Facility: CLINIC | Age: 82
End: 2017-06-06

## 2017-06-06 VITALS
WEIGHT: 170.4 LBS | HEART RATE: 77 BPM | SYSTOLIC BLOOD PRESSURE: 116 MMHG | BODY MASS INDEX: 24.39 KG/M2 | OXYGEN SATURATION: 99 % | HEIGHT: 70 IN | DIASTOLIC BLOOD PRESSURE: 72 MMHG

## 2017-06-06 DIAGNOSIS — M25.551 HIP PAIN, RIGHT: ICD-10-CM

## 2017-06-06 DIAGNOSIS — M25.551 HIP PAIN, RIGHT: Primary | ICD-10-CM

## 2017-06-06 DIAGNOSIS — M48.02 CERVICAL STENOSIS OF SPINAL CANAL: ICD-10-CM

## 2017-06-06 DIAGNOSIS — Z79.01 LONG-TERM (CURRENT) USE OF ANTICOAGULANTS: ICD-10-CM

## 2017-06-06 DIAGNOSIS — I26.99 PULMONARY EMBOLISM, BILATERAL (H): ICD-10-CM

## 2017-06-06 DIAGNOSIS — I48.0 PAROXYSMAL ATRIAL FIBRILLATION (H): ICD-10-CM

## 2017-06-06 LAB — INR POINT OF CARE: 5.4 (ref 0.86–1.14)

## 2017-06-06 PROCEDURE — 72100 X-RAY EXAM L-S SPINE 2/3 VWS: CPT

## 2017-06-06 PROCEDURE — 73502 X-RAY EXAM HIP UNI 2-3 VIEWS: CPT

## 2017-06-06 PROCEDURE — 85610 PROTHROMBIN TIME: CPT | Mod: QW

## 2017-06-06 PROCEDURE — 99213 OFFICE O/P EST LOW 20 MIN: CPT | Performed by: INTERNAL MEDICINE

## 2017-06-06 PROCEDURE — 99207 ZZC NO CHARGE NURSE ONLY: CPT

## 2017-06-06 PROCEDURE — 36416 COLLJ CAPILLARY BLOOD SPEC: CPT

## 2017-06-06 RX ORDER — CYCLOBENZAPRINE HCL 10 MG
10 TABLET ORAL 3 TIMES DAILY PRN
Qty: 90 TABLET | Refills: 0 | Status: SHIPPED | OUTPATIENT
Start: 2017-06-06 | End: 2018-01-10

## 2017-06-06 NOTE — PROGRESS NOTES
"[facility]  CLINIC PROGRESS NOTE    Subjective:  Fall   Hermilo Velasquez was recently picking up a wooden pallet and tripped backward and fell back.  He did hit the back of his head, but did not lose consciousness.  He did see a few starts, no black out.  He did have some bleeding.  He did have some right sided arm/shoulder pain and did see a chiropractor.   He has also had some residual right sided hip pain.  He is able to walk, but gait has been impaired.  He has had a history of degenerative joint disease hip.  He would like to have X-ray hip before preceding with any further therapy.       Past medical history, medications, allergies, social history, family history reviewed and updated in [facility] as of 6/6/2017 .    ROS  CONSTITUTIONAL: no fatigue, no unexpected change in weight  SKIN: no worrisome rashes, no worrisome moles, no worrisome lesions  EYES: no acute vision problems or changes  ENT: no ear problems, no mouth problems, no throat problems  RESP: no significant cough, no shortness of breath  CV: no chest pain, no palpitations, no new or worsening peripheral edema  GI: no nausea, no vomiting, no constipation, no diarrhea  : no frequency, no dysuria, no hematuria  MS: as above   PSYCHIATRIC: no changes in mood or affect      Objective:  Vitals  /72 (BP Location: Right arm, Patient Position: Chair, Cuff Size: Adult Regular)  Pulse 77  Ht 5' 10\" (1.778 m)  Wt 170 lb 6.4 oz (77.3 kg)  SpO2 99%  BMI 24.45 kg/m2  GEN: Alert Oriented x3 NAD  HEENT: Atraumatic, normocephalic, neck supple,    SKIN: No visible skin lesion or ulcerations  EXT: trace edema bilateral lower extremities  NEURO: Gait and station normal, No focal neurologic deficits  MS: Gait antalgic with limited range of motion of the right hip, flexion and extension limited of the hip  PSYCH: Mood good, affect mood congruent    Results for orders placed or performed in visit on 06/06/17 (from the past 24 hour(s))   INR point of " care   Result Value Ref Range    INR Protime 5.4 (A) 0.86 - 1.14       Assessment/Plan:  Patient Instructions   (M25.551) Hip pain, right  (primary encounter diagnosis)  Comment: We will request X-ray of the lumbar spine and hip today and plan to resume chiropractic pain.  If not improving with chiropractic then we will plan to follow up in orthopedics   Plan: XR Lumbar Spine 2/3 Views, XR Pelvis and Hip         Right 1 View            (M48.02) Cervical stenosis of spinal canal  Comment: OK to use flexeril   Plan: cyclobenzaprine (FLEXERIL) 10 MG tablet             15 minutes spent with patient.  Over 50% of time counseling    Follow up in orthopedics    Disclaimer: This note consists of symbols derived from keyboarding, dictation and/or voice recognition software. As a result, there may be errors in the script that have gone undetected. Please consider this when interpreting information found in this chart.    Bucky Boone MD  [phone]

## 2017-06-06 NOTE — PROGRESS NOTES
ANTICOAGULATION FOLLOW-UP CLINIC VISIT    Patient Name:  Hermilo Velasquez  Date:  6/6/2017  Contact Type:  Face to Face    SUBJECTIVE:     Patient Findings     Positives Change in medications, Other complaints    Comments Continues to take Prednisone. He reports that he is taking Prednisone 10 mg BID. Patient also reports that he [place] 2 [place] I advised to try and cut back in alcohol consumption if he can as that may also be affecting INR result. INR elevated today. Will have patient hold X2 nights and then take 2.5 mg Thursday. Recheck on Friday.            OBJECTIVE    INR Protime   Date Value Ref Range Status   06/06/2017 5.4 (A) 0.86 - 1.14 Final       ASSESSMENT / PLAN  INR assessment SUPRA    Recheck INR In: 3 DAYS    INR Location Clinic      Anticoagulation Summary as of 6/6/2017     INR goal 2.0-3.0   Today's INR 5.4!   Maintenance plan 5 mg (5 mg x 1) on Mon, Wed, Fri; 2.5 mg (5 mg x 0.5) all other days   Full instructions 6/6: Hold; 6/7: Hold; Otherwise 5 mg on Mon, Wed, Fri; 2.5 mg all other days   Weekly total 25 mg   Weekly max dose 45 mg   Plan last modified Evie Mckeon RN (5/25/2017)   Next INR check 6/9/2017   Priority INR   Target end date Indefinite    Indications   Long-term (current) use of anticoagulants [Z79.01] [Z79.01]  Pulmonary embolism  bilateral (H) [I26.99]  Paroxysmal atrial fibrillation (H) [I48.0]         Anticoagulation Episode Summary     [facility]    Preferred lab     Send INR reminders to [facility]AG[facility]    Comments       Anticoagulation Care Providers     Provider Role Specialty Phone number    Bucky Boone MD [facility] Internal Medicine [phone]            See the Encounter Report to view Anticoagulation Flowsheet and Dosing Calendar (Go to Encounters tab in chart review, and find the Anticoagulation Therapy Visit)    Dosage adjustment made based on physician directed care  plan.  Continues to take Prednisone. He reports that he is taking Prednisone 10 mg BID.   Patient also reports that he [place] 2 [facility] [facility]   I advised to try and cut back in alcohol consumption if he can as that may also be affecting INR result.   INR elevated today. Will have patient hold X2 nights and then take 2.5 mg Thursday. Recheck on Friday.     Alisha Meza RN

## 2017-06-06 NOTE — MR AVS SNAPSHOT
Hermilo Velasquez   6/6/2017 11:30 AM   Anticoagulation Therapy Visit    Description:  84 year old male   Provider:   ANTICOAGULATION CLINIC   Department:   Nurse           INR as of 6/6/2017     Today's INR 5.4!      Anticoagulation Summary as of 6/6/2017     INR goal 2.0-3.0   Today's INR 5.4!   Full instructions 6/6: Hold; 6/7: Hold; Otherwise 5 mg on Mon, Wed, Fri; 2.5 mg all other days   Next INR check 6/9/2017    Indications   Long-term (current) use of anticoagulants [Z79.01] [Z79.01]  Pulmonary embolism  bilateral (H) [I26.99]  Paroxysmal atrial fibrillation (H) [I48.0]         Your next Anticoagulation Clinic appointment(s)     Jun 09, 2017 11:00 AM CDT   Anticoagulation Visit with  ANTICOAGULATION CLINIC   [facility]    [address]  [place] [address]   [phone]              Contact Numbers     Clinic Number:         June 2017 Details    Sun Mon Tue Wed Thu Fri Sat         1               2               3                 4               5               6      Hold   See details      7      Hold         8      2.5 mg         9            10                 11               12               13               14               15               16               17                 18               19               20               21               22               23               24                 25               26               27               28               29               30                 Date Details   06/06 This INR check       Date of next INR:  6/9/2017         How to take your warfarin dose     To take:  2.5 mg Take 0.5 of a 5 mg tablet.    To take:  5 mg Take 1 of the 5 mg tablets.    Hold Do not take your warfarin dose. See the Details table to the right for additional instructions.

## 2017-06-07 NOTE — TELEPHONE ENCOUNTER
Patient has been informed of below.    He see's Dr. You already.      Radiology:  Patient will need copies of Xray of Lumbar spine and Hips to take with to Dr. You.  Patient will  at  when ready.  Thank you.  Janneth Allen RN

## 2017-06-07 NOTE — TELEPHONE ENCOUNTER
Can we call Hermilo Velasquez and let him know that     Your X-ray does show worsening degenerative disc disease throughout the lumbar spine and I would recommend we refer you to follow up in neurology - Dr. You - as I suspect that your pain is stemming from lumbar spine issue.    Bucky Boone MD

## 2017-06-09 ENCOUNTER — ANTICOAGULATION THERAPY VISIT (OUTPATIENT)
Dept: NURSING | Facility: CLINIC | Age: 82
End: 2017-06-09
Payer: COMMERCIAL

## 2017-06-09 DIAGNOSIS — I26.99 PULMONARY EMBOLISM, BILATERAL (H): ICD-10-CM

## 2017-06-09 DIAGNOSIS — Z79.01 LONG-TERM (CURRENT) USE OF ANTICOAGULANTS: ICD-10-CM

## 2017-06-09 DIAGNOSIS — I48.0 PAROXYSMAL ATRIAL FIBRILLATION (H): ICD-10-CM

## 2017-06-09 LAB — INR POINT OF CARE: 1.6 (ref 0.86–1.14)

## 2017-06-09 PROCEDURE — 85610 PROTHROMBIN TIME: CPT | Mod: QW

## 2017-06-09 PROCEDURE — 99207 ZZC NO CHARGE NURSE ONLY: CPT

## 2017-06-09 PROCEDURE — 36416 COLLJ CAPILLARY BLOOD SPEC: CPT

## 2017-06-09 NOTE — PROGRESS NOTES
ANTICOAGULATION FOLLOW-UP CLINIC VISIT    Patient Name:  Hermilo Velasquez  Date:  6/9/2017  Contact Type:  Face to Face    SUBJECTIVE:     Patient Findings     Positives Intentional hold of therapy    Comments On Prednisone 10 mg bid.           OBJECTIVE    INR Protime   Date Value Ref Range Status   06/09/2017 1.6 (A) 0.86 - 1.14 Final       ASSESSMENT / PLAN  INR assessment SUB    Recheck INR In: 10 DAYS    INR Location Clinic      Anticoagulation Summary as of 6/9/2017     INR goal 2.0-3.0   Today's INR 1.6!   Maintenance plan 5 mg (5 mg x 1) on Mon, Wed, Fri; 2.5 mg (5 mg x 0.5) all other days   Full instructions 5 mg on Mon, Wed, Fri; 2.5 mg all other days   Weekly total 25 mg   Weekly max dose 45 mg   No change documented Analisa Vigil RN   Plan last modified Evie Mckeon RN (5/25/2017)   Next INR check 6/20/2017   Priority INR   Target end date Indefinite    Indications   Long-term (current) use of anticoagulants [Z79.01] [Z79.01]  Pulmonary embolism  bilateral (H) [I26.99]  Paroxysmal atrial fibrillation (H) [I48.0]         Anticoagulation Episode Summary     IN[facility]    Preferred lab     Send INR reminders to [facility]    Comments       Anticoagulation Care Providers     Provider Role Specialty Phone number    Lisaafrica Bucky Munoz MD [facility] Internal Medicine [phone]            See the Encounter Report to view Anticoagulation Flowsheet and Dosing Calendar (Go to Encounters tab in chart review, and find the Anticoagulation Therapy Visit)    Dosage adjustment made based on physician directed care plan. Recent consecutive supra readings with intentional holds.  INR 1.6 today.  Resume 5 mg MWF and 2.5 mg ROW.  Recheck upon return from [facility] on 6/20/17.  Will call  today to find out what his Prednisone tapering orders should be.    Analisa Vigil, CECILIA

## 2017-06-09 NOTE — MR AVS SNAPSHOT
Hermilo MADRID [place]   6/9/2017 11:00 AM   Anticoagulation Therapy Visit    Description:  84 year old male   Provider:   ANTICOAGULATION CLINIC   Department:   Nurse           INR as of 6/9/2017     Today's INR 1.6!      Anticoagulation Summary as of 6/9/2017     INR goal 2.0-3.0   Today's INR 1.6!   Full instructions 5 mg on Mon, Wed, Fri; 2.5 mg all other days   Next INR check 6/20/2017    Indications   Long-term (current) use of anticoagulants [Z79.01] [Z79.01]  Pulmonary embolism  bilateral (H) [I26.99]  Paroxysmal atrial fibrillation (H) [I48.0]         Description     Unable to return for recheck until 6/20      Your next Anticoagulation Clinic appointment(s)     Jun 20, 2017 11:30 AM CDT   Anticoagulation Visit with  ANTICOAGULATION CLINIC   [facility] [place] ([facility])    [address]  [place] [address]   [phone]              Contact Numbers     Clinic Number:         June 2017 Details    Sun Mon Tue Wed Thu Fri Sat         1               2               3                 4               5               6               7               8               9      5 mg   See details      10      2.5 mg           11      2.5 mg         12      5 mg         13      2.5 mg         14      5 mg         15      2.5 mg         16      5 mg         17      2.5 mg           18      2.5 mg         19      5 mg         20            21               22               23               24                 25               26               27               28               29               30                 Date Details   06/09 This INR check       Date of next INR:  6/20/2017         How to take your warfarin dose     To take:  2.5 mg Take 0.5 of a 5 mg tablet.    To take:  5 mg Take 1 of the 5 mg tablets.

## 2017-06-20 ENCOUNTER — ANTICOAGULATION THERAPY VISIT (OUTPATIENT)
Dept: NURSING | Facility: CLINIC | Age: 82
End: 2017-06-20
Payer: COMMERCIAL

## 2017-06-20 DIAGNOSIS — Z79.01 LONG-TERM (CURRENT) USE OF ANTICOAGULANTS: ICD-10-CM

## 2017-06-20 DIAGNOSIS — I26.99 PULMONARY EMBOLISM, BILATERAL (H): ICD-10-CM

## 2017-06-20 DIAGNOSIS — I48.0 PAROXYSMAL ATRIAL FIBRILLATION (H): ICD-10-CM

## 2017-06-20 LAB — INR POINT OF CARE: 1.6 (ref 0.86–1.14)

## 2017-06-20 PROCEDURE — 85610 PROTHROMBIN TIME: CPT | Mod: QW

## 2017-06-20 PROCEDURE — 36416 COLLJ CAPILLARY BLOOD SPEC: CPT

## 2017-06-20 PROCEDURE — 99207 ZZC NO CHARGE NURSE ONLY: CPT

## 2017-06-20 NOTE — MR AVS SNAPSHOT
Hermilo MADRID Eva   6/20/2017 11:30 AM   Anticoagulation Therapy Visit    Description:  84 year old male   Provider:   ANTICOAGULATION CLINIC   Department:   Nurse           INR as of 6/20/2017     Today's INR 1.6!      Anticoagulation Summary as of 6/20/2017     INR goal 2.0-3.0   Today's INR 1.6!   Full instructions 6/25: 5 mg; 7/2: 5 mg; Otherwise 5 mg on Mon, Wed, Fri; 2.5 mg all other days   Next INR check 6/29/2017    Indications   Long-term (current) use of anticoagulants [Z79.01] [Z79.01]  Pulmonary embolism  bilateral (H) [I26.99]  Paroxysmal atrial fibrillation (H) [I48.0]         Your next Anticoagulation Clinic appointment(s)     Jun 29, 2017 11:15 AM CDT   Anticoagulation Visit with  ANTICOAGULATION CLINIC   [facility] [place] ([facility])    [address]  [place] [address]   [phone]              Contact Numbers     Clinic Number:         June 2017 Details    Sun Mon Tue Wed Thu Fri Sat         1               2               3                 4               5               6               7               8               9               10                 11               12               13               14               15               16               17                 18               19               20      2.5 mg   See details      21      5 mg         22      2.5 mg         23      5 mg         24      2.5 mg           25      5 mg         26      5 mg         27      2.5 mg         28      5 mg         29            30                 Date Details   06/20 This INR check       Date of next INR:  6/29/2017         How to take your warfarin dose     To take:  2.5 mg Take 0.5 of a 5 mg tablet.    To take:  5 mg Take 1 of the 5 mg tablets.

## 2017-06-20 NOTE — PROGRESS NOTES
ANTICOAGULATION FOLLOW-UP CLINIC VISIT    Patient Name:  Hermilo Velasquez  Date:  6/20/2017  Contact Type:  Face to Face    SUBJECTIVE:     Patient Findings     Positives Change in medications, Inflammation    Comments 6 days left of Prednisone. [place]. There are 2 large scabs formed around right elbow. Skin is bruised. Scheduled patient with Dr. Boone 6/27/17 for evaluation. Patient declined appt with TEAM at this time. Patient has not followed up with Dr. Espinal (Derm) as instructed from appointment notes with Dr. Espinal on 5/5/17. Patient has 6 tablets prednisone left. Recheck INR 6/29/17.            OBJECTIVE    INR Protime   Date Value Ref Range Status   06/20/2017 1.6 (A) 0.86 - 1.14 Final       ASSESSMENT / PLAN  INR assessment SUB    Recheck INR In: 1 WEEK    INR Location Clinic      Anticoagulation Summary as of 6/20/2017     INR goal 2.0-3.0   Today's INR 1.6!   Maintenance plan 5 mg (5 mg x 1) on Mon, Wed, Fri; 2.5 mg (5 mg x 0.5) all other days   Full instructions 6/25: 5 mg; 7/2: 5 mg; Otherwise 5 mg on Mon, Wed, Fri; 2.5 mg all other days   Weekly total 25 mg   Weekly max dose 45 mg   Plan last modified Alisha Meza RN (6/20/2017)   Next INR check 6/29/2017   Priority INR   Target end date Indefinite    Indications   Long-term (current) use of anticoagulants [Z79.01] [Z79.01]  Pulmonary embolism  bilateral (H) [I26.99]  Paroxysmal atrial fibrillation (H) [I48.0]         Anticoagulation Episode Summary     IN[facility]    Preferred lab     Send INR reminders to [facility] ANTICOAG[facility]ATION    Comments       Anticoagulation Care Providers     Provider Role Specialty Phone number    Bucky Boone MD Responsible Internal Medicine [phone]            See the Encounter Report to view Anticoagulation Flowsheet and Dosing Calendar (Go to Encounters tab in chart review, and find the Anticoagulation Therapy Visit)    Dosage  adjustment made based on physician directed care plan.  6 days left of Prednisone. [place] There are 2 large scabs formed around right elbow. Skin is bruised. Scheduled patient with Dr. Boone 6/27/17 for evaluation. Patient declined appt with TEAM at this time. Patient has not followed up with Dr. Espinal (Derm) as instructed from appointment notes with Dr. Espinal on 5/5/17. Patient has 6 tablets prednisone left. Recheck INR 6/29/17.     Alisha Meza RN

## 2017-06-29 ENCOUNTER — ANTICOAGULATION THERAPY VISIT (OUTPATIENT)
Dept: NURSING | Facility: CLINIC | Age: 82
End: 2017-06-29
Payer: COMMERCIAL

## 2017-06-29 DIAGNOSIS — I26.99 PULMONARY EMBOLISM, BILATERAL (H): ICD-10-CM

## 2017-06-29 DIAGNOSIS — Z79.01 LONG-TERM (CURRENT) USE OF ANTICOAGULANTS: ICD-10-CM

## 2017-06-29 DIAGNOSIS — I48.0 PAROXYSMAL ATRIAL FIBRILLATION (H): ICD-10-CM

## 2017-06-29 LAB — INR POINT OF CARE: 1.5 (ref 0.86–1.14)

## 2017-06-29 PROCEDURE — 85610 PROTHROMBIN TIME: CPT | Mod: QW

## 2017-06-29 PROCEDURE — 99207 ZZC NO CHARGE NURSE ONLY: CPT

## 2017-06-29 PROCEDURE — 36416 COLLJ CAPILLARY BLOOD SPEC: CPT

## 2017-06-29 NOTE — PROGRESS NOTES
ANTICOAGULATION FOLLOW-UP CLINIC VISIT    Patient Name:  Hermilo Velasquez  Date:  6/29/2017  Contact Type:  Face to Face    SUBJECTIVE:     Patient Findings     Positives No Problem Findings           OBJECTIVE    INR Protime   Date Value Ref Range Status   06/29/2017 1.5 (A) 0.86 - 1.14 Final       ASSESSMENT / PLAN  INR assessment SUB    Recheck INR In: 4 DAYS    INR Location Clinic      Anticoagulation Summary as of 6/29/2017     INR goal 2.0-3.0   Today's INR 1.5!   Maintenance plan 5 mg (5 mg x 1) on Mon, Wed, Fri; 2.5 mg (5 mg x 0.5) all other days   Full instructions 6/29: 5 mg; 7/1: 5 mg; 7/2: 5 mg; 7/6: 5 mg; 7/8: 5 mg; 7/9: 5 mg; Otherwise 5 mg on Mon, Wed, Fri; 2.5 mg all other days   Weekly total 25 mg   Weekly max dose 45 mg   Plan last modified Alisha Meza RN (6/20/2017)   Next INR check 7/3/2017   Priority INR   Target end date Indefinite    Indications   Long-term (current) use of anticoagulants [Z79.01] [Z79.01]  Pulmonary embolism  bilateral (H) [I26.99]  Paroxysmal atrial fibrillation (H) [I48.0]         Anticoagulation Episode Summary     [facility]    Preferred lab     Send INR reminders to [facility]    Comments       Anticoagulation Care Providers     Provider Role Specialty Phone number    Mely Bucky Munoz MD Responsible Internal Medicine [phone]            See the Encounter Report to view Anticoagulation Flowsheet and Dosing Calendar (Go to Encounters tab in chart review, and find the Anticoagulation Therapy Visit)    Dosage adjustment made based on physician directed care plan. Patient believes he took 5 mg Sun, but not absolutely sure.  He is leaving town on Monday and agrees to INR BEFORE leaving.  Take 5 mg daily and recheck. He had 27.5 mg/7 days.  On Monday, his weekly dose will be 32.5 mg.    Analisa Vigil RN

## 2017-06-29 NOTE — MR AVS SNAPSHOT
Hermilo Velasquez   6/29/2017 11:15 AM   Anticoagulation Therapy Visit    Description:  84 year old male   Provider:   ANTICOAGULATION CLINIC   Department:   Nurse           INR as of 6/29/2017     Today's INR 1.5!      Anticoagulation Summary as of 6/29/2017     INR goal 2.0-3.0   Today's INR 1.5!   Full instructions 6/29: 5 mg; 7/1: 5 mg; 7/2: 5 mg; 7/6: 5 mg; 7/8: 5 mg; 7/9: 5 mg; Otherwise 5 mg on Mon, Wed, Fri; 2.5 mg all other days   Next INR check 7/3/2017    Indications   Long-term (current) use of anticoagulants [Z79.01] [Z79.01]  Pulmonary embolism  bilateral (H) [I26.99]  Paroxysmal atrial fibrillation (H) [I48.0]         Description     Patient is leaving town and will NOT come back untile 7/12/17.  He knows this is too long to wait.      Your next Anticoagulation Clinic appointment(s)     Jul 03, 2017 11:15 AM CDT   Anticoagulation [facility]  [facility] CLINIC   [facility]    [address] [facility] [address]   [phone]              Contact Numbers     Clinic Number:         June 2017 Details    Sun Mon Tue Wed Thu Fri Sat         1               2               3                 4               5               6               7               8               9               10                 11               12               13               14               15               16               17                 18               19               20               21               22               23               24                 25               26               27               28               29      5 mg   See details      30      5 mg           Date Details   06/29 This INR check               How to take your warfarin dose     To take:  5 mg Take 1 of the 5 mg tablets.           July 2017 Details    Sun Mon Tue Wed Thu Fri Sat           1      5 mg           2      5 mg         3            4               5               6                7               8                 9               10               11               12               13               14               15                 16               17               18               19               20               21               22                 23               24               25               26               27               28               29                 30               31                     Date Details   No additional details    Date of next INR:  7/3/2017         How to take your warfarin dose     To take:  5 mg Take 1 of the 5 mg tablets.

## 2017-07-03 ENCOUNTER — ANTICOAGULATION THERAPY VISIT (OUTPATIENT)
Dept: NURSING | Facility: CLINIC | Age: 82
End: 2017-07-03
Payer: COMMERCIAL

## 2017-07-03 DIAGNOSIS — Z79.01 LONG-TERM (CURRENT) USE OF ANTICOAGULANTS: ICD-10-CM

## 2017-07-03 DIAGNOSIS — I26.99 PULMONARY EMBOLISM, BILATERAL (H): ICD-10-CM

## 2017-07-03 DIAGNOSIS — I48.0 PAROXYSMAL ATRIAL FIBRILLATION (H): ICD-10-CM

## 2017-07-03 LAB — INR POINT OF CARE: 1.6 (ref 0.86–1.14)

## 2017-07-03 PROCEDURE — 85610 PROTHROMBIN TIME: CPT | Mod: QW

## 2017-07-03 PROCEDURE — 99207 ZZC NO CHARGE NURSE ONLY: CPT

## 2017-07-03 PROCEDURE — 36416 COLLJ CAPILLARY BLOOD SPEC: CPT

## 2017-07-03 NOTE — PROGRESS NOTES
ANTICOAGULATION FOLLOW-UP CLINIC VISIT    Patient Name:  Hermilo Velasquez  Date:  7/3/2017  Contact Type:  Face to Face    SUBJECTIVE:     Patient Findings     Positives Unexplained INR or factor level change           OBJECTIVE    INR Protime   Date Value Ref Range Status   07/03/2017 1.6 (A) 0.86 - 1.14 Final       ASSESSMENT / PLAN  INR assessment SUB    Recheck INR In: 2 WEEKS    INR Location Clinic      Anticoagulation Summary as of 7/3/2017     INR goal 2.0-3.0   Today's INR 1.6!   Maintenance plan 5 mg (5 mg x 1) on Mon, Wed, Fri; 2.5 mg (5 mg x 0.5) all other days   Full instructions 7/4: 5 mg; 7/6: 5 mg; 7/8: 5 mg; 7/9: 5 mg; 7/11: 5 mg; 7/13: 5 mg; 7/15: 5 mg; 7/16: 5 mg; Otherwise 5 mg on Mon, Wed, Fri; 2.5 mg all other days   Weekly total 25 mg   Weekly max dose 45 mg   Plan last modified Alisha Meza RN (6/20/2017)   Next INR check 7/18/2017   Priority INR   Target end date Indefinite    Indications   Long-term (current) use of anticoagulants [Z79.01] [Z79.01]  Pulmonary embolism  bilateral (H) [I26.99]  Paroxysmal atrial fibrillation (H) [I48.0]         Anticoagulation Episode Summary     [facility]    Preferred lab     Send INR reminders to [facility]    Comments       Anticoagulation Care Providers     Provider Role Specialty Phone number    Mely Bucky Munoz MD [facility] Internal Medicine [phone]            See the Encounter Report to view Anticoagulation Flowsheet and Dosing Calendar (Go to Encounters tab in chart review, and find the Anticoagulation Therapy Visit)    Patient will be out of town for next 2 weeks.  Unknown reason why patient is sub.  Denies changes with diet, exercise, OTCs, etc.  Dosing based on [facility] Protocol and Provider directed care plan.      Janneth Allen RN

## 2017-07-03 NOTE — MR AVS SNAPSHOT
Hermilo Velasquez   7/3/2017 11:15 AM   Anticoagulation Therapy Visit    Description:  84 year old male   Provider:   ANTICOAGULATION CLINIC   Department:   Nurse           INR as of 7/3/2017     Today's INR 1.6!      Anticoagulation Summary as of 7/3/2017     INR goal 2.0-3.0   Today's INR 1.6!   Full instructions 7/4: 5 mg; 7/6: 5 mg; 7/8: 5 mg; 7/9: 5 mg; 7/11: 5 mg; 7/13: 5 mg; 7/15: 5 mg; 7/16: 5 mg; Otherwise 5 mg on Mon, Wed, Fri; 2.5 mg all other days   Next INR check 7/18/2017    Indications   Long-term (current) use of anticoagulants [Z79.01] [Z79.01]  Pulmonary embolism  bilateral (H) [I26.99]  Paroxysmal atrial fibrillation (H) [I48.0]         Your next Anticoagulation Clinic appointment(s)     Jul 18, 2017 11:30 AM CDT   Anticoagulation Visit with  ANTICOAGULATION CLINIC   [facility] [place] ([facility])    [address]  [place] [address]   [phone]              Contact Numbers     Clinic Number:         July 2017 Details    Sun Mon Tue Wed Thu Fri Sat           1                 2               3      5 mg   See details      4      5 mg         5      5 mg         6      5 mg         7      5 mg         8      5 mg           9      5 mg         10      5 mg         11      5 mg         12      5 mg         13      5 mg         14      5 mg         15      5 mg           16      5 mg         17      5 mg         18            19               20               21               22                 23               24               25               26               27               28               29                 30               31                     Date Details   07/03 This INR check       Date of next INR:  7/18/2017         How to take your warfarin dose     To take:  2.5 mg Take 0.5 of a 5 mg tablet.    To take:  5 mg Take 1 of the 5 mg tablets.

## 2017-07-17 ENCOUNTER — TRANSFERRED RECORDS (OUTPATIENT)
Dept: HEALTH INFORMATION MANAGEMENT | Facility: CLINIC | Age: 82
End: 2017-07-17

## 2017-07-18 ENCOUNTER — TELEPHONE (OUTPATIENT)
Dept: FAMILY MEDICINE | Facility: CLINIC | Age: 82
End: 2017-07-18

## 2017-07-18 ENCOUNTER — ANTICOAGULATION THERAPY VISIT (OUTPATIENT)
Dept: NURSING | Facility: CLINIC | Age: 82
End: 2017-07-18
Payer: COMMERCIAL

## 2017-07-18 DIAGNOSIS — I26.99 PULMONARY EMBOLISM, BILATERAL (H): ICD-10-CM

## 2017-07-18 DIAGNOSIS — L29.9 CHRONIC PRURITUS: ICD-10-CM

## 2017-07-18 DIAGNOSIS — Z79.01 LONG-TERM (CURRENT) USE OF ANTICOAGULANTS: ICD-10-CM

## 2017-07-18 DIAGNOSIS — I48.0 PAROXYSMAL ATRIAL FIBRILLATION (H): ICD-10-CM

## 2017-07-18 LAB — INR POINT OF CARE: 3.2 (ref 0.86–1.14)

## 2017-07-18 PROCEDURE — 85610 PROTHROMBIN TIME: CPT | Mod: QW

## 2017-07-18 PROCEDURE — 36416 COLLJ CAPILLARY BLOOD SPEC: CPT

## 2017-07-18 PROCEDURE — 99207 ZZC NO CHARGE NURSE ONLY: CPT

## 2017-07-18 RX ORDER — HYDROXYZINE HYDROCHLORIDE 25 MG/1
25-50 TABLET, FILM COATED ORAL EVERY 6 HOURS PRN
Qty: 120 TABLET | Refills: 3 | COMMUNITY
Start: 2017-07-18 | End: 2017-10-09

## 2017-07-18 NOTE — MR AVS SNAPSHOT
Hermilo MADRID Eva   7/18/2017 11:30 AM   Anticoagulation Therapy Visit    Description:  84 year old male   Provider:   ANTICOAGULATION CLINIC   Department:  [facility] Nurse           INR as of 7/18/2017     Today's INR 3.2!      Anticoagulation Summary as of 7/18/2017     INR goal 2.0-3.0   Today's INR 3.2!   Full instructions 2.5 mg on Tue, Sat; 5 mg all other days   Next INR check 8/1/2017    Indications   Long-term (current) use of anticoagulants [Z79.01] [Z79.01]  Pulmonary embolism  bilateral (H) [I26.99]  Paroxysmal atrial fibrillation (H) [I48.0]         Your next Anticoagulation Clinic appointment(s)     Aug 02, 2017 11:30 AM CDT   Anticoagulation Visit with  ANTICOAGULATION CLINIC   [facility] [place] ([facility])    [address]  [place] [address]   [phone]              Contact Numbers     Clinic Number:         July 2017 Details    Sun Mon Tue Wed Thu Fri Sat           1                 2               3               4               5               6               7               8                 9               10               11               12               13               14               15                 16               17               18      2.5 mg   See details      19      5 mg         20      5 mg         21      5 mg         22      2.5 mg           23      5 mg         24      5 mg         25      2.5 mg         26      5 mg         27      5 mg         28      5 mg         29      2.5 mg           30      5 mg         31      5 mg               Date Details   07/18 This INR check               How to take your warfarin dose     To take:  2.5 mg Take 0.5 of a 5 mg tablet.    To take:  5 mg Take 1 of the 5 mg tablets.           August 2017 Details    Sun Mon Tue Wed Thu Fri Sat       1            2               3               4               5                 6               7               8               9               10               11                12                 13               14               15               16               17               18               19                 20               21               22               23               24               25               26                 27               28               29               30               31                  Date Details   No additional details    Date of next INR:  8/1/2017         How to take your warfarin dose     To take:  2.5 mg Take 0.5 of a 5 mg tablet.

## 2017-07-18 NOTE — TELEPHONE ENCOUNTER
Reason for Call:  Other Med update    Detailed comments: The patient called to let Dr. Boone know that he has been taking  hydrOXYzine (ATARAX) 25 MG tablet because he has started itching again but he is only taking 2 a day    Phone Number Patient can be reached at: Home number on file [phone] (home)    Best Time: anytime    Can we leave a detailed message on this number? YES    Call taken on 7/18/2017 at 10:37 AM by Neha Darling

## 2017-07-18 NOTE — PROGRESS NOTES
ANTICOAGULATION FOLLOW-UP CLINIC VISIT    Patient Name:  Hermilo Velasquez  Date:  7/18/2017  Contact Type:  Face to Face    SUBJECTIVE:     Patient Findings     Positives Missed doses    Comments Missed 5 mg 3 days ago           OBJECTIVE    INR Protime   Date Value Ref Range Status   07/18/2017 3.2 (A) 0.86 - 1.14 Final       ASSESSMENT / PLAN  INR assessment SUPRA    Recheck INR In: 2 WEEKS    INR Location Clinic      Anticoagulation Summary as of 7/18/2017     INR goal 2.0-3.0   Today's INR 3.2!   Maintenance plan 2.5 mg (5 mg x 0.5) on Tue, Sat; 5 mg (5 mg x 1) all other days   Full instructions 2.5 mg on Tue, Sat; 5 mg all other days   Weekly total 30 mg   Weekly max dose 45 mg   Plan last modified Analisa Vigil RN (7/18/2017)   Next INR check    Priority INR   Target end date Indefinite    Indications   Long-term (current) use of anticoagulants [Z79.01] [Z79.01]  Pulmonary embolism  bilateral (H) [I26.99]  Paroxysmal atrial fibrillation (H) [I48.0]         Anticoagulation Episode Summary     [facility]    Preferred lab     Send INR reminders to [facility]    Comments       Anticoagulation Care Providers     Provider Role Specialty Phone number    Bucky Boone MD Responsible Internal Medicine [phone]            See the Encounter Report to view Anticoagulation Flowsheet and Dosing Calendar (Go to Encounters tab in chart review, and find the Anticoagulation Therapy Visit)    Dosage adjustment made based on physician directed care plan. Recent sub readings.  Dose increase 2 weeks ago. Missed 5 mg this week, so has had 30 mg/7 days. Begin 2.5 mg Tu,Sa and 5 mg ROW. Recheck 2 weeks.    Analisa Vigil RN

## 2017-08-02 ENCOUNTER — ANTICOAGULATION THERAPY VISIT (OUTPATIENT)
Dept: NURSING | Facility: CLINIC | Age: 82
End: 2017-08-02
Payer: COMMERCIAL

## 2017-08-02 DIAGNOSIS — I48.0 PAROXYSMAL ATRIAL FIBRILLATION (H): ICD-10-CM

## 2017-08-02 DIAGNOSIS — Z79.01 LONG-TERM (CURRENT) USE OF ANTICOAGULANTS: ICD-10-CM

## 2017-08-02 DIAGNOSIS — I26.99 PULMONARY EMBOLISM, BILATERAL (H): ICD-10-CM

## 2017-08-02 LAB — INR POINT OF CARE: 1.4 (ref 0.86–1.14)

## 2017-08-02 PROCEDURE — 99207 ZZC NO CHARGE NURSE ONLY: CPT

## 2017-08-02 PROCEDURE — 36416 COLLJ CAPILLARY BLOOD SPEC: CPT

## 2017-08-02 PROCEDURE — 85610 PROTHROMBIN TIME: CPT | Mod: QW

## 2017-08-02 NOTE — PROGRESS NOTES
ANTICOAGULATION FOLLOW-UP CLINIC VISIT    Patient Name:  Hermilo Velasquez  Date:  8/2/2017  Contact Type:  Face to Face    SUBJECTIVE:     Patient Findings     Comments Patient denies any changes or missing any doses even though the  Warfarin was increased, the INR went from 3.0 to 1.4.  Will need to pull him in more frequently.  Not sure about compliance.  He has a valve so does not qualify for NOAC's. (Aortic valve replacement 2015)           OBJECTIVE    INR Protime   Date Value Ref Range Status   08/02/2017 1.4 (A) 0.86 - 1.14 Final       ASSESSMENT / PLAN  INR assessment SUB    Recheck INR In: 5 DAYS    INR Location Clinic      Anticoagulation Summary as of 8/2/2017     INR goal 2.0-3.0   Today's INR 1.4!   Maintenance plan 2.5 mg (5 mg x 0.5) on Tue, Sat; 5 mg (5 mg x 1) all other days   Full instructions 8/2: 10 mg; 8/3: 7.5 mg; Otherwise 2.5 mg on Tue, Sat; 5 mg all other days   Weekly total 30 mg   Weekly max dose 45 mg   Plan last modified Analisa Vigil RN (7/18/2017)   Next INR check 8/7/2017   Priority INR   Target end date Indefinite    Indications   Long-term (current) use of anticoagulants [Z79.01] [Z79.01]  Pulmonary embolism  bilateral (H) [I26.99]  Paroxysmal atrial fibrillation (H) [I48.0]         Anticoagulation Episode Summary     [facility]    Preferred lab     Send INR reminders to [facility]    Comments       Anticoagulation Care Providers     Provider Role Specialty Phone number    Bucky Boone MD [facility] Internal Medicine [phone]            See the Encounter Report to view Anticoagulation Flowsheet and Dosing Calendar (Go to Encounters tab in chart review, and find the Anticoagulation Therapy Visit)    Dosage adjustment made based on physician directed care plan.    Mireya Otto RN

## 2017-08-02 NOTE — MR AVS SNAPSHOT
Hermilo Velasquez   8/2/2017 11:30 AM   Anticoagulation Therapy Visit    Description:  84 year old male   Provider:   ANTICOAGULATION CLINIC   Department:   Nurse           INR as of 8/2/2017     Today's INR 1.4!      Anticoagulation Summary as of 8/2/2017     INR goal 2.0-3.0   Today's INR 1.4!   Full instructions 8/2: 10 mg; 8/3: 7.5 mg; Otherwise 2.5 mg on Tue, Sat; 5 mg all other days   Next INR check 8/7/2017    Indications   Long-term (current) use of anticoagulants [Z79.01] [Z79.01]  Pulmonary embolism  bilateral (H) [I26.99]  Paroxysmal atrial fibrillation (H) [I48.0]         Description     Patient denies any changes or missing any doses even though the  Warfarin was increased, the INR went from 3.0 to 1.4.  Will need to pull him in more frequently.  Not sure about compliance.  He has a valve so does not qualify for NOAC's.      Your next Anticoagulation Clinic appointment(s)     Aug 07, 2017 11:30 AM CDT   Anticoagulation Visit with  ANTICOAGULATION CLINIC   [facility]    [address]  [place] [address]   [phone]              Contact Numbers     Clinic Number:         August 2017 Details    Sun Mon Tue Wed Thu Fri Sat       1               2      10 mg   See details      3      7.5 mg         4      5 mg         5      2.5 mg           6      5 mg         7            8               9               10               11               12                 13               14               15               16               17               18               19                 20               21               22               23               24               25               26                 27               28               29               30               31                  Date Details   08/02 This INR check       Date of next INR:  8/7/2017         How to take your warfarin dose     To take:  2.5 mg Take 0.5 of a 5 mg tablet.    To take:  5 mg  Take 1 of the 5 mg tablets.    To take:  7.5 mg Take 1.5 of the 5 mg tablets.    To take:  10 mg Take 2 of the 5 mg tablets.

## 2017-08-07 ENCOUNTER — TELEPHONE (OUTPATIENT)
Dept: FAMILY MEDICINE | Facility: CLINIC | Age: 82
End: 2017-08-07

## 2017-08-07 ENCOUNTER — ANTICOAGULATION THERAPY VISIT (OUTPATIENT)
Dept: NURSING | Facility: CLINIC | Age: 82
End: 2017-08-07
Payer: COMMERCIAL

## 2017-08-07 DIAGNOSIS — Z79.01 LONG-TERM (CURRENT) USE OF ANTICOAGULANTS: Primary | ICD-10-CM

## 2017-08-07 DIAGNOSIS — I48.0 PAROXYSMAL ATRIAL FIBRILLATION (H): ICD-10-CM

## 2017-08-07 DIAGNOSIS — Z79.01 LONG-TERM (CURRENT) USE OF ANTICOAGULANTS: ICD-10-CM

## 2017-08-07 DIAGNOSIS — I26.99 PULMONARY EMBOLISM, BILATERAL (H): ICD-10-CM

## 2017-08-07 LAB — INR POINT OF CARE: 1.5 (ref 0.86–1.14)

## 2017-08-07 PROCEDURE — 99207 ZZC NO CHARGE NURSE ONLY: CPT

## 2017-08-07 PROCEDURE — 85610 PROTHROMBIN TIME: CPT | Mod: QW

## 2017-08-07 PROCEDURE — 36416 COLLJ CAPILLARY BLOOD SPEC: CPT

## 2017-08-07 NOTE — MR AVS SNAPSHOT
eHrmilo MADRID Eva   8/7/2017 11:30 AM   Anticoagulation Therapy Visit    Description:  84 year old male   Provider:   ANTICOAGULATION CLINIC   Department:   Nurse           INR as of 8/7/2017     Today's INR 1.5!      Anticoagulation Summary as of 8/7/2017     INR goal 2.0-3.0   Today's INR 1.5!   Full instructions 8/7: 10 mg; 8/8: 10 mg; Otherwise 2.5 mg on Tue, Sat; 5 mg all other days   Next INR check 8/10/2017    Indications   Long-term (current) use of anticoagulants [Z79.01] [Z79.01]  Pulmonary embolism  bilateral (H) [I26.99]  Paroxysmal atrial fibrillation (H) [I48.0]         Your next Anticoagulation Clinic appointment(s)     Aug 10, 2017 11:45 AM CDT   Anticoagulation Visit with  ANTICOAGULATION CLINIC   [facility]    [address]  [place] [address]   [phone]              Contact Numbers     Clinic Number:         August 2017 Details    Sun Mon Tue Wed Thu Fri Sat       1               2               3               4               5                 6               7      10 mg   See details      8      10 mg         9      5 mg         10            11               12                 13               14               15               16               17               18               19                 20               21               22               23               24               25               26                 27               28               29               30               31                  Date Details   08/07 This INR check       Date of next INR:  8/10/2017         How to take your warfarin dose     To take:  5 mg Take 1 of the 5 mg tablets.    To take:  10 mg Take 2 of the 5 mg tablets.

## 2017-08-07 NOTE — PROGRESS NOTES
ANTICOAGULATION FOLLOW-UP CLINIC VISIT    Patient Name:  Hermilo Velasquez  Date:  8/7/2017  Contact Type:  Face to Face    SUBJECTIVE:     Patient Findings     Comments Pt denies any missed doses or change in plans.  Pt has reported in past INR enct missed doses.  Pt does not use pill box and advised pt to use pill box. Pt states will try this. Pt also admits to drinking 1 drink (whiskey) a night, but sometimes doesn't drink whole drink. Denies more than this.               OBJECTIVE    INR Protime   Date Value Ref Range Status   08/07/2017 1.5 (A) 0.86 - 1.14 Final       ASSESSMENT / PLAN  INR assessment SUB    Recheck INR In: 3 DAYS    INR Location Clinic      Anticoagulation Summary as of 8/7/2017     INR goal 2.0-3.0   Today's INR 1.5!   Maintenance plan 2.5 mg (5 mg x 0.5) on Tue, Sat; 5 mg (5 mg x 1) all other days   Full instructions 8/7: 10 mg; 8/8: 10 mg; Otherwise 2.5 mg on Tue, Sat; 5 mg all other days   Weekly total 30 mg   Weekly max dose 45 mg   Plan last modified Analisa Vigil RN (7/18/2017)   Next INR check 8/10/2017   Priority INR   Target end date Indefinite    Indications   Long-term (current) use of anticoagulants [Z79.01] [Z79.01]  Pulmonary embolism  bilateral (H) [I26.99]  Paroxysmal atrial fibrillation (H) [I48.0]         Anticoagulation Episode Summary     [facility]    Preferred lab     Send INR reminders to [facility]    Comments       Anticoagulation Care Providers     Provider Role Specialty Phone number    Bucky Boone MD [facility] Internal Medicine [phone]            See the Encounter Report to view Anticoagulation Flowsheet and Dosing Calendar (Go to Encounters tab in chart review, and find the Anticoagulation Therapy Visit)    Dosage adjustment made based on physician directed care plan.    Dione Giron, RN

## 2017-08-07 NOTE — TELEPHONE ENCOUNTER
PCP,    Pended future CMP for review.   Per [place] today, please place correct hepatic labs if appropriate.  Pt does report having 1 drink of whiskey daily at INR today.    Concern that this may be higher due to pt's consistant low INR's.   INR nurse will have pt complete labs Thursday when here in office.    Will also ask pt to switch coumadin to AM dosing.    No need to route if ordered.  Dione Giron RN

## 2017-08-07 NOTE — TELEPHONE ENCOUNTER
Yes, OK for comprehensive metabolic panel  - would recommend he abstain from alcohol for two weeks to see how this affects his INR.  Bucky Boone MD

## 2017-08-10 ENCOUNTER — ANTICOAGULATION THERAPY VISIT (OUTPATIENT)
Dept: NURSING | Facility: CLINIC | Age: 82
End: 2017-08-10
Payer: COMMERCIAL

## 2017-08-10 DIAGNOSIS — Z79.01 LONG-TERM (CURRENT) USE OF ANTICOAGULANTS: ICD-10-CM

## 2017-08-10 DIAGNOSIS — I48.0 PAROXYSMAL ATRIAL FIBRILLATION (H): ICD-10-CM

## 2017-08-10 DIAGNOSIS — I26.99 PULMONARY EMBOLISM, BILATERAL (H): ICD-10-CM

## 2017-08-10 LAB — INR POINT OF CARE: 2.5 (ref 0.86–1.14)

## 2017-08-10 PROCEDURE — 80053 COMPREHEN METABOLIC PANEL: CPT | Performed by: INTERNAL MEDICINE

## 2017-08-10 PROCEDURE — 36416 COLLJ CAPILLARY BLOOD SPEC: CPT

## 2017-08-10 PROCEDURE — 85610 PROTHROMBIN TIME: CPT | Mod: QW

## 2017-08-10 NOTE — PROGRESS NOTES
ANTICOAGULATION FOLLOW-UP CLINIC VISIT    Patient Name:  Hermilo Velasquez  Date:  8/10/2017  Contact Type:  Face to Face    SUBJECTIVE:     Patient Findings     Positives No Problem Findings           OBJECTIVE    INR Protime   Date Value Ref Range Status   08/10/2017 2.5 (A) 0.86 - 1.14 Final       ASSESSMENT / PLAN  INR assessment THER    Recheck INR In: 2 WEEKS    INR Location Clinic      Anticoagulation Summary as of 8/10/2017     INR goal 2.0-3.0   Today's INR 2.5   Maintenance plan 2.5 mg (5 mg x 0.5) on Tue, Sat; 5 mg (5 mg x 1) all other days   Full instructions 8/12: 5 mg; 8/15: 5 mg; 8/19: 5 mg; Otherwise 2.5 mg on Tue, Sat; 5 mg all other days   Weekly total 30 mg   Weekly max dose 45 mg   Plan last modified Analisa Vigil RN (7/18/2017)   Next INR check 8/22/2017   Priority INR   Target end date Indefinite    Indications   Long-term (current) use of anticoagulants [Z79.01] [Z79.01]  Pulmonary embolism  bilateral (H) [I26.99]  Paroxysmal atrial fibrillation (H) [I48.0]         Anticoagulation Episode Summary     [facility]    Preferred lab     Send INR reminders to [facility]    Comments       Anticoagulation Care Providers     Provider Role Specialty Phone number    [place]isa[place] Bucky Munoz MD [facility] Internal Medicine [phone]            See the Encounter Report to view Anticoagulation Flowsheet and Dosing Calendar (Go to Encounters tab in chart review, and find the Anticoagulation Therapy Visit)    Patient [place] to [place] cabin for the next 2 weeks.  He will have INR drawn upon return on 8/22.  He will change and take Coumadin in AM rather than PM.  Dosing based on [facility] Protocol and Provider directed care plan.      Janneth Allen, RN

## 2017-08-10 NOTE — MR AVS SNAPSHOT
Hermilo MADRID Eva   8/10/2017 11:45 AM   Anticoagulation Therapy Visit    Description:  84 year old male   Provider:   ANTICOAGULATION CLINIC   Department:   Nurse           INR as of 8/10/2017     Today's INR 2.5      Anticoagulation Summary as of 8/10/2017     INR goal 2.0-3.0   Today's INR 2.5   Full instructions 8/12: 5 mg; 8/15: 5 mg; 8/19: 5 mg; Otherwise 2.5 mg on Tue, Sat; 5 mg all other days   Next INR check 8/22/2017    Indications   Long-term (current) use of anticoagulants [Z79.01] [Z79.01]  Pulmonary embolism  bilateral (H) [I26.99]  Paroxysmal atrial fibrillation (H) [I48.0]         Your next Anticoagulation Clinic appointment(s)     Aug 22, 2017 11:45 AM CDT   Anticoagulation Visit with  ANTICOAGULATION CLINIC   [facility] [place] ([facility])    [address]  [place] [address]   [phone]              Contact Numbers     Clinic Number:         August 2017 Details    Sun Mon Tue Wed Thu Fri Sat       1               2               3               4               5                 6               7               8               9               10      5 mg   See details      11      5 mg         12      5 mg           13      5 mg         14      5 mg         15      5 mg         16      5 mg         17      5 mg         18      5 mg         19      5 mg           20      5 mg         21      5 mg         22            23               24               25               26                 27               28               29               30               31                  Date Details   08/10 This INR check       Date of next INR:  8/22/2017         How to take your warfarin dose     To take:  2.5 mg Take 0.5 of a 5 mg tablet.    To take:  5 mg Take 1 of the 5 mg tablets.

## 2017-08-11 LAB
ALBUMIN SERPL-MCNC: 3.1 G/DL (ref 3.4–5)
ALP SERPL-CCNC: 91 U/L (ref 40–150)
ALT SERPL W P-5'-P-CCNC: 16 U/L (ref 0–70)
ANION GAP SERPL CALCULATED.3IONS-SCNC: 5 MMOL/L (ref 3–14)
AST SERPL W P-5'-P-CCNC: 16 U/L (ref 0–45)
BILIRUB SERPL-MCNC: 0.4 MG/DL (ref 0.2–1.3)
BUN SERPL-MCNC: 17 MG/DL (ref 7–30)
CALCIUM SERPL-MCNC: 8.6 MG/DL (ref 8.5–10.1)
CHLORIDE SERPL-SCNC: 105 MMOL/L (ref 94–109)
CO2 SERPL-SCNC: 32 MMOL/L (ref 20–32)
CREAT SERPL-MCNC: 0.83 MG/DL (ref 0.66–1.25)
GFR SERPL CREATININE-BSD FRML MDRD: 88 ML/MIN/1.7M2
GLUCOSE SERPL-MCNC: 98 MG/DL (ref 70–99)
POTASSIUM SERPL-SCNC: 3.8 MMOL/L (ref 3.4–5.3)
PROT SERPL-MCNC: 6.3 G/DL (ref 6.8–8.8)
SODIUM SERPL-SCNC: 142 MMOL/L (ref 133–144)

## 2017-08-17 DIAGNOSIS — I26.99 PULMONARY EMBOLISM, BILATERAL (H): ICD-10-CM

## 2017-08-17 DIAGNOSIS — I48.0 PAROXYSMAL ATRIAL FIBRILLATION (H): ICD-10-CM

## 2017-08-17 DIAGNOSIS — Z79.01 LONG-TERM (CURRENT) USE OF ANTICOAGULANTS: ICD-10-CM

## 2017-08-17 RX ORDER — WARFARIN SODIUM 5 MG/1
TABLET ORAL
Qty: 140 TABLET | Refills: 0 | Status: SHIPPED | OUTPATIENT
Start: 2017-08-17 | End: 2017-12-28

## 2017-08-17 NOTE — TELEPHONE ENCOUNTER
Prescription approved per [facility] Refill Protocol.  Janneth Allen RN

## 2017-08-17 NOTE — TELEPHONE ENCOUNTER
warfarin (COUMADIN) 5 MG tablet    Last Written Prescription Date: 3/22/17  Last Fill Qty: 140, # refills: 0  Last Office Visit with [facility] prescribing provider: 6/6/17       Date and Result of Last PT/INR:   Lab Results   Component Value Date    INR 2.5 08/10/2017    INR 1.5 08/07/2017    INR 2.76 02/12/2017    INR 2.76 02/12/2017          Thao ARNDT(R)

## 2017-08-22 ENCOUNTER — ANTICOAGULATION THERAPY VISIT (OUTPATIENT)
Dept: NURSING | Facility: CLINIC | Age: 82
End: 2017-08-22
Payer: COMMERCIAL

## 2017-08-22 DIAGNOSIS — I26.99 PULMONARY EMBOLISM, BILATERAL (H): ICD-10-CM

## 2017-08-22 DIAGNOSIS — I48.0 PAROXYSMAL ATRIAL FIBRILLATION (H): ICD-10-CM

## 2017-08-22 DIAGNOSIS — Z79.01 LONG-TERM (CURRENT) USE OF ANTICOAGULANTS: ICD-10-CM

## 2017-08-22 LAB — INR POINT OF CARE: 1.9 (ref 0.86–1.14)

## 2017-08-22 PROCEDURE — 99207 ZZC NO CHARGE NURSE ONLY: CPT

## 2017-08-22 PROCEDURE — 85610 PROTHROMBIN TIME: CPT | Mod: QW

## 2017-08-22 PROCEDURE — 36416 COLLJ CAPILLARY BLOOD SPEC: CPT

## 2017-08-22 NOTE — PROGRESS NOTES
ANTICOAGULATION FOLLOW-UP CLINIC VISIT    Patient Name:  Hermilo Velasquez  Date:  8/22/2017  Contact Type:  Face to Face    SUBJECTIVE:     Patient Findings     Positives No Problem Findings           OBJECTIVE    INR Protime   Date Value Ref Range Status   08/22/2017 1.9 (A) 0.86 - 1.14 Final       ASSESSMENT / PLAN  INR assessment SUB    Recheck INR In: 3 WEEKS    INR Location Clinic      Anticoagulation Summary as of 8/22/2017     INR goal 2.0-3.0   Today's INR 1.9!   Maintenance plan 5 mg (5 mg x 1) every day   Full instructions 8/22: 7.5 mg; 8/26: 5 mg; 8/29: 5 mg; 9/5: 5 mg; Otherwise 5 mg every day   Weekly total 35 mg   Weekly max dose 45 mg   Plan last modified Janneth Allen RN (8/22/2017)   Next INR check 9/12/2017   Priority INR   Target end date Indefinite    Indications   Long-term (current) use of anticoagulants [Z79.01] [Z79.01]  Pulmonary embolism  bilateral (H) [I26.99]  Paroxysmal atrial fibrillation (H) [I48.0]         Anticoagulation Episode Summary     [facility]    Preferred lab     Send INR reminders to [facility]    Comments       Anticoagulation Care Providers     Provider Role Specialty Phone number    Bucky Boone MD Responsible Internal Medicine [phone]            See the Encounter Report to view Anticoagulation Flowsheet and Dosing Calendar (Go to Encounters tab in chart review, and find the Anticoagulation Therapy Visit)    Dosing based on [facility] Protocol and Provider directed care plan.      Janneth Allen RN

## 2017-08-22 NOTE — MR AVS SNAPSHOT
Hermilo MADRID Leakevin   8/22/2017 11:45 AM   Anticoagulation Therapy Visit    Description:  84 year old male   Provider:   ANTICOAGULATION CLINIC   Department:   Nurse           INR as of 8/22/2017     Today's INR 1.9!      Anticoagulation Summary as of 8/22/2017     INR goal 2.0-3.0   Today's INR 1.9!   Full instructions 8/22: 7.5 mg; 8/26: 5 mg; 8/29: 5 mg; 9/5: 5 mg; Otherwise 5 mg every day   Next INR check 9/12/2017    Indications   Long-term (current) use of anticoagulants [Z79.01] [Z79.01]  Pulmonary embolism  bilateral (H) [I26.99]  Paroxysmal atrial fibrillation (H) [I48.0]         Your next Anticoagulation Clinic appointment(s)     Sep 12, 2017 11:45 AM CDT   Anticoagulation Visit with  ANTICOAGULATION CLINIC   [facility]    [address]  [place] [address]   [phone]              Contact Numbers     Clinic Number:         August 2017 Details    Sun Mon Tue Wed Thu Fri Sat       1               2               3               4               5                 6               7               8               9               10               11               12                 13               14               15               16               17               18               19                 20               21               22      7.5 mg   See details      23      5 mg         24      5 mg         25      5 mg         26      5 mg           27      5 mg         28      5 mg         29      5 mg         30      5 mg         31      5 mg            Date Details   08/22 This INR check               How to take your warfarin dose     To take:  5 mg Take 1 of the 5 mg tablets.    To take:  7.5 mg Take 1.5 of the 5 mg tablets.           September 2017 Details    Sun Mon Tue Wed Thu Fri Sat          1      5 mg         2      5 mg           3      5 mg         4      5 mg         5      5 mg         6      5 mg         7      5 mg         8      5 mg          9      5 mg           10      5 mg         11      5 mg         12            13               14               15               16                 17               18               19               20               21               22               23                 24               25               26               27               28               29               30                Date Details   No additional details    Date of next INR:  9/12/2017         How to take your warfarin dose     To take:  5 mg Take 1 of the 5 mg tablets.

## 2017-08-23 ENCOUNTER — TRANSFERRED RECORDS (OUTPATIENT)
Dept: HEALTH INFORMATION MANAGEMENT | Facility: CLINIC | Age: 82
End: 2017-08-23

## 2017-08-28 ENCOUNTER — ANTICOAGULATION THERAPY VISIT (OUTPATIENT)
Dept: NURSING | Facility: CLINIC | Age: 82
End: 2017-08-28
Payer: COMMERCIAL

## 2017-08-28 DIAGNOSIS — I48.0 PAROXYSMAL ATRIAL FIBRILLATION (H): ICD-10-CM

## 2017-08-28 DIAGNOSIS — I26.99 PULMONARY EMBOLISM, BILATERAL (H): ICD-10-CM

## 2017-08-28 DIAGNOSIS — Z79.01 LONG-TERM (CURRENT) USE OF ANTICOAGULANTS: ICD-10-CM

## 2017-08-28 LAB — INR POINT OF CARE: 1.2 (ref 0.86–1.14)

## 2017-08-28 PROCEDURE — 99207 ZZC NO CHARGE NURSE ONLY: CPT

## 2017-08-28 PROCEDURE — 85610 PROTHROMBIN TIME: CPT | Mod: QW

## 2017-08-28 PROCEDURE — 36416 COLLJ CAPILLARY BLOOD SPEC: CPT

## 2017-08-28 NOTE — PROGRESS NOTES
ANTICOAGULATION FOLLOW-UP CLINIC VISIT    Patient Name:  Hermilo Velasquez  Date:  8/28/2017  Contact Type:  Face to Face    SUBJECTIVE:     Patient Findings     Positives Other complaints, Intentional hold of therapy    Comments Will be having back injection today so held dose x 4 days, will restart tonight           OBJECTIVE    INR Protime   Date Value Ref Range Status   08/28/2017 1.2 (A) 0.86 - 1.14 Final       ASSESSMENT / PLAN  INR assessment SUB    Recheck INR In: 2 WEEKS    INR Location Clinic      Anticoagulation Summary as of 8/28/2017     INR goal 2.0-3.0   Today's INR 1.2!   Maintenance plan 5 mg (5 mg x 1) every day   Full instructions 8/29: 5 mg; 9/5: 5 mg; Otherwise 5 mg every day   Weekly total 35 mg   Weekly max dose 45 mg   Plan last modified Janneth Allen RN (8/22/2017)   Next INR check 9/12/2017   Target end date Indefinite    Indications   Long-term (current) use of anticoagulants [Z79.01] [Z79.01]  Pulmonary embolism  bilateral (H) [I26.99]  Paroxysmal atrial fibrillation (H) [I48.0]         Anticoagulation Episode Summary     [facility]    Preferred lab     Send INR reminders to [facility]    Comments       Anticoagulation Care Providers     Provider Role Specialty Phone number    Bucky Boone MD Responsible Internal Medicine [phone]            See the Encounter Report to view Anticoagulation Flowsheet and Dosing Calendar (Go to Encounters tab in chart review, and find the Anticoagulation Therapy Visit)    Dosage adjustment made based on physician directed care plan.  Pt had intentional hold for spinal injection. Barring any complications, pt will restart coumadin tonight and go back to regular coumadin schedule.       Dione Giron RN

## 2017-08-28 NOTE — MR AVS SNAPSHOT
Hermilo Velasquez   8/28/2017 3:15 PM   Anticoagulation Therapy Visit    Description:  84 year old male   Provider:   ANTICOAGULATION CLINIC   Department:   Nurse           INR as of 8/28/2017     Today's INR 1.2!      Anticoagulation Summary as of 8/28/2017     INR goal 2.0-3.0   Today's INR 1.2!   Full instructions 8/29: 5 mg; 9/5: 5 mg; Otherwise 5 mg every day   Next INR check 9/12/2017    Indications   Long-term (current) use of anticoagulants [Z79.01] [Z79.01]  Pulmonary embolism  bilateral (H) [I26.99]  Paroxysmal atrial fibrillation (H) [I48.0]         Your next Anticoagulation Clinic appointment(s)     Aug 28, 2017  3:15 PM CDT   Anticoagulation Visit with  ANTICOAGULATION CLINIC   [facility]    [address]  [place] [address]   [phone]            Sep 12, 2017 11:45 AM CDT   Anticoagulation Visit with  ANTICOAGULATION CLINIC   [facility]    [address]  [place] [address]   [phone]              Contact Numbers     Clinic Number:         August 2017 Details    Sun Mon Tue Wed Thu Fri Sat       1               2               3               4               5                 6               7               8               9               10               11               12                 13               14               15               16               17               18               19                 20               21               22               23               24               25               26                 27               28      5 mg   See details      29      5 mg         30      5 mg         31      5 mg            Date Details   08/28 This INR check               How to take your warfarin dose     To take:  5 mg Take 1 of the 5 mg tablets.           September 2017 Details    Sun Mon Tue Wed Thu Fri Sat          1      5 mg         2      5 mg           3      5 mg         4       5 mg         5      5 mg         6      5 mg         7      5 mg         8      5 mg         9      5 mg           10      5 mg         11      5 mg         12            13               14               15               16                 17               18               19               20               21               22               23                 24               25               26               27               28               29               30                Date Details   No additional details    Date of next INR:  9/12/2017         How to take your warfarin dose     To take:  5 mg Take 1 of the 5 mg tablets.

## 2017-09-12 ENCOUNTER — ANTICOAGULATION THERAPY VISIT (OUTPATIENT)
Dept: NURSING | Facility: CLINIC | Age: 82
End: 2017-09-12
Payer: COMMERCIAL

## 2017-09-12 DIAGNOSIS — I48.0 PAROXYSMAL ATRIAL FIBRILLATION (H): ICD-10-CM

## 2017-09-12 DIAGNOSIS — I26.99 PULMONARY EMBOLISM, BILATERAL (H): ICD-10-CM

## 2017-09-12 DIAGNOSIS — Z79.01 LONG-TERM (CURRENT) USE OF ANTICOAGULANTS: ICD-10-CM

## 2017-09-12 LAB — INR POINT OF CARE: 1.8 (ref 0.86–1.14)

## 2017-09-12 PROCEDURE — 99207 ZZC NO CHARGE NURSE ONLY: CPT

## 2017-09-12 PROCEDURE — 85610 PROTHROMBIN TIME: CPT | Mod: QW

## 2017-09-12 PROCEDURE — 36416 COLLJ CAPILLARY BLOOD SPEC: CPT

## 2017-09-12 NOTE — PROGRESS NOTES
ANTICOAGULATION FOLLOW-UP CLINIC VISIT    Patient Name:  Hermilo Velasquez  Date:  9/12/2017  Contact Type:  Face to Face    SUBJECTIVE:     Patient Findings     Comments Patient recently held 4 days for procedure. INR is slow to rise. Will increase dose 7.5 Tues; 5 mg AOD for next 2 weeks.   Recheck in 2 weeks.            OBJECTIVE    INR Protime   Date Value Ref Range Status   09/12/2017 1.8 (A) 0.86 - 1.14 Final       ASSESSMENT / PLAN  INR assessment SUB    Recheck INR In: 2 WEEKS    INR Location Clinic      Anticoagulation Summary as of 9/12/2017     INR goal 2.0-3.0   Today's INR 1.8!   Maintenance plan 5 mg (5 mg x 1) every day   Full instructions 9/12: 7.5 mg; 9/19: 7.5 mg; Otherwise 5 mg every day   Weekly total 35 mg   Weekly max dose 45 mg   Plan last modified Janneth Allen RN (8/22/2017)   Next INR check 9/26/2017   Target end date Indefinite    Indications   Long-term (current) use of anticoagulants [Z79.01] [Z79.01]  Pulmonary embolism  bilateral (H) [I26.99]  Paroxysmal atrial fibrillation (H) [I48.0]         Anticoagulation Episode Summary     [facility]    Preferred lab     Send INR reminders to [facility]    Comments       Anticoagulation Care Providers     Provider Role Specialty Phone number    Bucky Boone MD [facility] Internal Medicine [phone]            See the Encounter Report to view Anticoagulation Flowsheet and Dosing Calendar (Go to Encounters tab in chart review, and find the Anticoagulation Therapy Visit)    Dosage adjustment made based on physician directed care plan.  Patient recently held 4 days for procedure. INR is slow to rise. Will increase dose 7.5 Tues; 5 mg AOD for next 2 weeks.   Recheck in 2 weeks.     Alisha Meza, RN

## 2017-09-12 NOTE — MR AVS SNAPSHOT
Hermilo MADRID Eva   9/12/2017 11:45 AM   Anticoagulation Therapy Visit    Description:  84 year old male   Provider:   ANTICOAGULATION CLINIC   Department:   Nurse           INR as of 9/12/2017     Today's INR 1.8!      Anticoagulation Summary as of 9/12/2017     INR goal 2.0-3.0   Today's INR 1.8!   Full instructions 9/12: 7.5 mg; 9/19: 7.5 mg; Otherwise 5 mg every day   Next INR check 9/26/2017    Indications   Long-term (current) use of anticoagulants [Z79.01] [Z79.01]  Pulmonary embolism  bilateral (H) [I26.99]  Paroxysmal atrial fibrillation (H) [I48.0]         Your next Anticoagulation Clinic appointment(s)     Sep 26, 2017  1:30 PM CDT   Anticoagulation Visit with  ANTICOAGULATION CLINIC   [facility]    [address]  [place] [address]   [phone]              Contact Numbers     Clinic Number:         September 2017 Details    Sun Mon Tue Wed Thu Fri Sat          1               2                 3               4               5               6               7               8               9                 10               11               12      7.5 mg   See details      13      5 mg         14      5 mg         15      5 mg         16      5 mg           17      5 mg         18      5 mg         19      7.5 mg         20      5 mg         21      5 mg         22      5 mg         23      5 mg           24      5 mg         25      5 mg         26            27               28               29               30                Date Details   09/12 This INR check       Date of next INR:  9/26/2017         How to take your warfarin dose     To take:  5 mg Take 1 of the 5 mg tablets.    To take:  7.5 mg Take 1.5 of the 5 mg tablets.

## 2017-09-26 ENCOUNTER — ANTICOAGULATION THERAPY VISIT (OUTPATIENT)
Dept: NURSING | Facility: CLINIC | Age: 82
End: 2017-09-26
Payer: COMMERCIAL

## 2017-09-26 DIAGNOSIS — Z79.01 LONG-TERM (CURRENT) USE OF ANTICOAGULANTS: ICD-10-CM

## 2017-09-26 DIAGNOSIS — I26.99 PULMONARY EMBOLISM, BILATERAL (H): ICD-10-CM

## 2017-09-26 DIAGNOSIS — I48.0 PAROXYSMAL ATRIAL FIBRILLATION (H): ICD-10-CM

## 2017-09-26 LAB — INR POINT OF CARE: 2.2 (ref 0.86–1.14)

## 2017-09-26 PROCEDURE — 85610 PROTHROMBIN TIME: CPT | Mod: QW

## 2017-09-26 PROCEDURE — 99207 ZZC NO CHARGE NURSE ONLY: CPT

## 2017-09-26 PROCEDURE — 36416 COLLJ CAPILLARY BLOOD SPEC: CPT

## 2017-09-26 NOTE — PROGRESS NOTES
ANTICOAGULATION FOLLOW-UP CLINIC VISIT    Patient Name:  Hermilo Velasquez  Date:  9/26/2017  Contact Type:  Face to Face    SUBJECTIVE:     Patient Findings     Positives Missed doses    Comments 9/19/17 missed dose. Patient took 7.5 mg the following day after missed dose. INR therapeutic today. Will try again for patient to take 7.5 mg Tuesdays and 5 mg AOD             OBJECTIVE    INR Protime   Date Value Ref Range Status   09/26/2017 2.2 (A) 0.86 - 1.14 Final       ASSESSMENT / PLAN  INR assessment THER    Recheck INR In: 3 WEEKS    INR Location Clinic      Anticoagulation Summary as of 9/26/2017     INR goal 2.0-3.0   Today's INR 2.2   Maintenance plan 5 mg (5 mg x 1) every day   Full instructions 9/26: 7.5 mg; 10/3: 7.5 mg; 10/10: 7.5 mg; Otherwise 5 mg every day   Weekly total 35 mg   Weekly max dose 45 mg   Plan last modified Janneth Allen RN (8/22/2017)   Next INR check 10/17/2017   Target end date Indefinite    Indications   Long-term (current) use of anticoagulants [Z79.01] [Z79.01]  Pulmonary embolism  bilateral (H) [I26.99]  Paroxysmal atrial fibrillation (H) [I48.0]         Anticoagulation Episode Summary     [facility]    Preferred lab     Send INR reminders to [facility]    Comments       Anticoagulation Care Providers     Provider Role Specialty Phone number    Lisaafrica Bucky Munoz MD [facility] Internal Medicine [phone]            See the Encounter Report to view Anticoagulation Flowsheet and Dosing Calendar (Go to Encounters tab in chart review, and find the Anticoagulation Therapy Visit)    Dosage adjustment made based on physician directed care plan.    Alisha Meza, RN

## 2017-09-26 NOTE — MR AVS SNAPSHOT
Hermilo Velasquez   9/26/2017 1:30 PM   Anticoagulation Therapy Visit    Description:  84 year old male   Provider:   ANTICOAGULATION CLINIC   Department:   Nurse           INR as of 9/26/2017     Today's INR 2.2      Anticoagulation Summary as of 9/26/2017     INR goal 2.0-3.0   Today's INR 2.2   Full instructions 9/26: 7.5 mg; 10/3: 7.5 mg; 10/10: 7.5 mg; Otherwise 5 mg every day   Next INR check 10/17/2017    Indications   Long-term (current) use of anticoagulants [Z79.01] [Z79.01]  Pulmonary embolism  bilateral (H) [I26.99]  Paroxysmal atrial fibrillation (H) [I48.0]         Your next Anticoagulation Clinic appointment(s)     Oct 18, 2017  1:45 PM CDT   Anticoagulation Visit with  ANTICOAGULATION CLINIC   [facility]    [address]  [place] [address]   [phone]              Contact Numbers     Clinic Number:         September 2017 Details    Sun Mon Tue Wed Thu Fri Sat          1               2                 3               4               5               6               7               8               9                 10               11               12               13               14               15               16                 17               18               19               20               21               22               23                 24               25               26      7.5 mg   See details      27      5 mg         28      5 mg         29      5 mg         30      5 mg          Date Details   09/26 This INR check               How to take your warfarin dose     To take:  5 mg Take 1 of the 5 mg tablets.    To take:  7.5 mg Take 1.5 of the 5 mg tablets.           October 2017 Details    Sun Mon Tue Wed Thu Fri Sat     1      5 mg         2      5 mg         3      7.5 mg         4      5 mg         5      5 mg         6      5 mg         7      5 mg           8      5 mg         9      5 mg         10      7.5 mg         11       5 mg         12      5 mg         13      5 mg         14      5 mg           15      5 mg         16      5 mg         17            18               19               20               21                 22               23               24               25               26               27               28                 29               30               31                    Date Details   No additional details    Date of next INR:  10/17/2017         How to take your warfarin dose     To take:  5 mg Take 1 of the 5 mg tablets.    To take:  7.5 mg Take 1.5 of the 5 mg tablets.

## 2017-10-09 ENCOUNTER — TELEPHONE (OUTPATIENT)
Dept: FAMILY MEDICINE | Facility: CLINIC | Age: 82
End: 2017-10-09

## 2017-10-09 ENCOUNTER — ANTICOAGULATION THERAPY VISIT (OUTPATIENT)
Dept: NURSING | Facility: CLINIC | Age: 82
End: 2017-10-09
Payer: COMMERCIAL

## 2017-10-09 ENCOUNTER — OFFICE VISIT (OUTPATIENT)
Dept: FAMILY MEDICINE | Facility: CLINIC | Age: 82
End: 2017-10-09
Payer: COMMERCIAL

## 2017-10-09 VITALS
WEIGHT: 170 LBS | HEIGHT: 70 IN | DIASTOLIC BLOOD PRESSURE: 74 MMHG | TEMPERATURE: 97.8 F | BODY MASS INDEX: 24.34 KG/M2 | HEART RATE: 58 BPM | SYSTOLIC BLOOD PRESSURE: 137 MMHG | OXYGEN SATURATION: 96 %

## 2017-10-09 DIAGNOSIS — I26.99 PULMONARY EMBOLISM, BILATERAL (H): ICD-10-CM

## 2017-10-09 DIAGNOSIS — Z79.01 LONG-TERM (CURRENT) USE OF ANTICOAGULANTS: ICD-10-CM

## 2017-10-09 DIAGNOSIS — I48.0 PAROXYSMAL ATRIAL FIBRILLATION (H): ICD-10-CM

## 2017-10-09 DIAGNOSIS — L29.9 CHRONIC PRURITUS: ICD-10-CM

## 2017-10-09 DIAGNOSIS — I27.82 OTHER CHRONIC PULMONARY EMBOLISM WITHOUT ACUTE COR PULMONALE (H): ICD-10-CM

## 2017-10-09 DIAGNOSIS — L24.0 CONTACT DERMATITIS AND ECZEMA DUE TO DETERGENTS: Primary | ICD-10-CM

## 2017-10-09 DIAGNOSIS — I48.0 PAROXYSMAL ATRIAL FIBRILLATION (H): Primary | ICD-10-CM

## 2017-10-09 LAB — INR POINT OF CARE: 2.6 (ref 0.86–1.14)

## 2017-10-09 PROCEDURE — 99207 ZZC NO CHARGE NURSE ONLY: CPT

## 2017-10-09 PROCEDURE — 85610 PROTHROMBIN TIME: CPT | Mod: QW

## 2017-10-09 PROCEDURE — 36416 COLLJ CAPILLARY BLOOD SPEC: CPT

## 2017-10-09 PROCEDURE — 99213 OFFICE O/P EST LOW 20 MIN: CPT | Performed by: INTERNAL MEDICINE

## 2017-10-09 RX ORDER — PREDNISONE 10 MG/1
TABLET ORAL
Qty: 18 TABLET | Refills: 0 | Status: SHIPPED | OUTPATIENT
Start: 2017-10-09 | End: 2018-01-10

## 2017-10-09 RX ORDER — HYDROXYZINE HYDROCHLORIDE 25 MG/1
25-50 TABLET, FILM COATED ORAL EVERY 6 HOURS PRN
Qty: 120 TABLET | Refills: 3 | Status: SHIPPED | OUTPATIENT
Start: 2017-10-09 | End: 2018-01-10

## 2017-10-09 NOTE — PROGRESS NOTES
ANTICOAGULATION FOLLOW-UP CLINIC VISIT    Patient Name:  Hermilo Velasquez  Date:  10/9/2017  Contact Type:  Face to Face    SUBJECTIVE:        OBJECTIVE    INR Protime   Date Value Ref Range Status   10/09/2017 2.6 (A) 0.86 - 1.14 Final       ASSESSMENT / PLAN  INR assessment THER    Recheck INR In: 3 WEEKS    INR Location Clinic      Anticoagulation Summary as of 10/9/2017     INR goal 2.0-3.0   Today's INR 2.6   Maintenance plan 5 mg (5 mg x 1) every day   Full instructions 5 mg every day   Weekly total 35 mg   Weekly max dose 45 mg   Plan last modified Janneth Allen RN (8/22/2017)   Next INR check 10/30/2017   Target end date Indefinite    Indications   Long-term (current) use of anticoagulants [Z79.01] [Z79.01]  Pulmonary embolism  bilateral (H) [I26.99]  Paroxysmal atrial fibrillation (H) [I48.0]         Anticoagulation Episode Summary     [facility]    Preferred lab     Send INR reminders to [facility]    Comments       Anticoagulation Care Providers     Provider Role Specialty Phone number    Bucky Boone MD [facility] Internal Medicine [phone]            See the Encounter Report to view Anticoagulation Flowsheet and Dosing Calendar (Go to Encounters tab in chart review, and find the Anticoagulation Therapy Visit)    Patient placed on prednisone for reaction/rash from lidocaine cream. Dosing based on [facility] Protocol and Provider directed care plan.      Janneth Allen, RN

## 2017-10-09 NOTE — NURSING NOTE
"Chief Complaint   Patient presents with     RECHECK     blisters from lido ointment on feet       Initial /74 (BP Location: Right arm, Patient Position: Sitting, Cuff Size: Adult Regular)  Pulse 58  Temp 97.8  F (36.6  C) (Tympanic)  Ht 5' 10\" (1.778 m)  Wt 170 lb (77.1 kg)  SpO2 96%  BMI 24.39 kg/m2 Estimated body mass index is 24.39 kg/(m^2) as calculated from the following:    Height as of this encounter: 5' 10\" (1.778 m).    Weight as of this encounter: 170 lb (77.1 kg).  Medication Reconciliation: complete   Jessica Rodriguez- MAGDY      "

## 2017-10-09 NOTE — PROGRESS NOTES
"[facility]  CLINIC PROGRESS NOTE    Subjective:  Peripheral Neuropathy    Hermilo Velasquez was recently treated in the pain clinic for degenerative disc disease lumbar spine.  He was also given lidocaine ointment for treatment of his neuropathy and foot pain.  He noticed within two days that he had a severe reaction with blistering all over the feet.  He has had some improvement since stopping the medication about 8 days ago.  He then followed up with dermatology and was recommended to take a steroid cream which soothes the itching and pain.  He did also take hydroxyzine which helped.      Past medical history, medications, allergies, social history, family history reviewed and updated in [facility] as of 10/9/2017 .    ROS  CONSTITUTIONAL: no fatigue, no unexpected change in weight  SKIN: as above   EYES: no acute vision problems or changes  ENT: no ear problems, no mouth problems, no throat problems  RESP: no significant cough, no shortness of breath  CV: no chest pain, no palpitations, no new or worsening peripheral edema  GI: no nausea, no vomiting, no constipation, no diarrhea  : no frequency, no dysuria, no hematuria  MS: no claudication, no myalgias, no joint aches  PSYCHIATRIC: no changes in mood or affect      Objective:  Vitals  /74 (BP Location: Right arm, Patient Position: Sitting, Cuff Size: Adult Regular)  Pulse 58  Temp 97.8  F (36.6  C) (Tympanic)  Ht 5' 10\" (1.778 m)  Wt 170 lb (77.1 kg)  SpO2 96%  BMI 24.39 kg/m2  GEN: Alert Oriented x3 NAD  HEENT: Atraumatic, normocephalic, neck supple, no thyromegaly, negative cervical adenopathy  CV: RRR 2/6 systolic murmur  PULM: CTA no wheezes or crackles  ABD: Soft, nontender nondistende   SKIN: diffuse papular red rash on both feet.  EXT: 2+  edema bilateral lower extremities  NEURO: Gait and station normal, No focal neurologic deficits  PSYCH: Mood good, affect mood congruent    No results found for this or any previous visit (from the " past 24 hour(s)).    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  Monitor for any upset stomach - there is a risk of bleeding peptic ulcer with this medication and taking warfarin.  Plan: predniSONE (DELTASONE) 10 MG tablet                   Follow up as needed     Disclaimer: This note consists of symbols derived from keyboarding, dictation and/or voice recognition software. As a result, there may be errors in the script that have gone undetected. Please consider this when interpreting information found in this chart.    Bucky Boone MD  [phone]

## 2017-10-09 NOTE — PATIENT INSTRUCTIONS
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  Monitor for any upset stomach - there is a risk of bleeding peptic ulcer with this medication and taking warfarin.  Plan: predniSONE (DELTASONE) 10 MG tablet

## 2017-10-09 NOTE — TELEPHONE ENCOUNTER
Reason for Call:  Other Medication question    Detailed comments: Pt started Lidocaine on 9/29 and has blisters on foot. Pt states they are popping.    Phone Number Patient can be reached at: Home number on file [phone] (home)    Best Time: Any    Can we leave a detailed message on this number? YES    Call taken on 10/9/2017 at 11:24 AM by Caroline Scott

## 2017-10-09 NOTE — MR AVS SNAPSHOT
Hermilo MERCY Velasquez   10/9/2017 2:30 PM   Anticoagulation Therapy Visit    Description:  84 year old male   Provider:   ANTICOAGULATION CLINIC   Department:   Nurse           INR as of 10/9/2017     Today's INR 2.6      Anticoagulation Summary as of 10/9/2017     INR goal 2.0-3.0   Today's INR 2.6   Full instructions 5 mg every day   Next INR check 10/30/2017    Indications   Long-term (current) use of anticoagulants [Z79.01] [Z79.01]  Pulmonary embolism  bilateral (H) [I26.99]  Paroxysmal atrial fibrillation (H) [I48.0]         Your next Anticoagulation Clinic appointment(s)     Oct 18, 2017  1:45 PM CDT   An[facility]  [facility]    [address]  [place] [address]   [phone]            Oct 30, 2017 11:00 AM CDT   Anticoagulation Visit with  [facility]ATION CLINIC   [facility]    [address]  [place] [address]   [phone]              Contact Numbers     Clinic Number:         October 2017 Details    Sun Mon Tue Wed Thu Fri Sat     1               2               3               4               5               6               7                 8               9      5 mg   See details      10      5 mg         11      5 mg         12      5 mg         13      5 mg         14      5 mg           15      5 mg         16      5 mg         17      5 mg         18      5 mg         19      5 mg         20      5 mg         21      5 mg           22      5 mg         23      5 mg         24      5 mg         25      5 mg         26      5 mg         27      5 mg         28      5 mg           29      5 mg         30            31                    Date Details   10/09 This INR check       Date of next INR:  10/30/2017         How to take your warfarin dose     To take:  5 mg Take 1 of the 5 mg tablets.

## 2017-10-09 NOTE — TELEPHONE ENCOUNTER
To PCP:  Patient's INR Clinic Referral has .  Please see new INR clinic referral above.  Please sign if appropriate.  Thank you.  Janneth Allen RN

## 2017-10-09 NOTE — MR AVS SNAPSHOT
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  Monitor for any upset stomach - there is a risk of bleeding peptic ulcer with this medication and taking warfarin.  Plan: predniSONE (DELTASONE) 10 MG tablet                     Follow-ups after your visit        Your next 10 appointments already scheduled     Oct 09, 2017  4:30 PM CDT   [facility] Visit with Bucky Boone [facility]   [facility]    [address] [facility] [id number] [phone]           Bring a current list of meds and any records pertaining to this visit. For Physicals, please bring immunization records and any forms needing to be filled out. Please arrive 10 minutes early to complete paperwork.            Oct 18, 2017  1:45 PM CDT   Anticoagulation Visit with [facility]   [facility]    [address]  [facility] [id number] [phone]              Who to contact     If you have questions or need follow up information about today's clinic visit or your schedule please contact [facility] directly at [phone].  Normal or non-critical lab and imaging results will be communicated to you by MyChart, letter or phone within 4 business days after the clinic has received the results. If you do not hear from us within 7 days, please contact the clinic through  "[employer] or phone. If you have a critical or abnormal lab result, we will notify you by phone as soon as possible.  Submit refill requests through [facility] or call your pharmacy and they will forward the refill request to us. Please allow 3 business days for your refill to be completed.          Additional Information About Your Visit        [employer] Information     [facility] lets you send messages to your doctor, view your test results, renew your prescriptions, schedule appointments and more. To sign up, go to [email or web address] . Click on \"Log in\" on the left side of the screen, which will take you to the Welcome page. Then click on \"Sign up Now\" on the right side of the page.     You will be asked to enter the access code listed below, as well as some personal information. Please follow the directions to create your username and password.     Your access code is: [email or web address]  Expires: 2018  2:21 PM     Your access code will  in 90 days. If you need help or a new code, please call your [facility] clinic or [phone].        Care EveryWhere ID     This is your Care EveryWhere ID. This could be used by other organizations to access your [facility] medical records  [id number]        Your Vitals Were     Pulse Temperature Height Pulse Oximetry BMI (Body Mass Index)       58 97.8  F (36.6  C) (Tympanic) 5' 10\" (1.778 m) 96% 24.39 kg/m2        Blood Pressure from Last 3 Encounters:   10/09/17 137/74   17 116/72   17 140/83    Weight from Last 3 Encounters:   10/09/17 170 lb (77.1 kg)   17 170 lb 6.4 oz (77.3 kg)   17 182 lb (82.6 kg)              Today, you had the following     No orders found for display         Today's Medication Changes          These changes are accurate as of: 10/9/17  2:21 PM.  If you have any questions, ask your nurse or doctor.               Start taking these medicines.        Dose/Directions    predniSONE 10 MG tablet   Commonly known as:  DELTASONE   Used " for:  Contact dermatitis and eczema due to detergents   Started by:  Bucky Boone MD        3 po qd x 3 days then 2 po qd x 3 days then 1 qd x 3 days   Quantity:  18 tablet   Refills:  0            Where to get your medicines      These medications were [facility] to [id number] - [place] [place] [facility] [address] [facility]  [address] [facility] [place] [place] [address]     Phone:  [phone]     predniSONE 10 MG tablet                Primary Care Provider Office Phone # Fax #    Bucky Boone [phone] [address]  [facility] [address]        Equal Access to Services     [place] ValleyAUBRIE : Hadii hakeem mancera hadasho Soomaali, waaxda luqadaha, qaybta kaalmada adeegyada, jf salazar . So [facility] [phone].    ATENCIÓN: Si habla español, tiene a randle disposición servicios gratuitos de asistencia lingüística. Llame al [phone].    We comply with applicable federal civil rights laws and [place] laws. We do not discriminate on the basis of race, color, national origin, age, disability, sex, sexual orientation, or gender identity.            Thank you!     Thank you for choosing [facility]  for your care. Our goal is always to provide you with excellent care. Hearing back from our patients is one way we can continue to improve our services. Please take a few minutes to complete the written survey that you may receive in the mail after your visit with us. Thank you!             Your Updated Medication List - Protect others around you: Learn how to safely use, store and throw away your medicines at www.disposemymeds.org.          This list is accurate as of: 10/9/17  2:21 PM.  Always use your most recent med list.                   Brand Name Dispense Instructions for use Diagnosis    atorvastatin 10 MG tablet    LIPITOR    90 tablet    Take 1 tablet (10 mg) by mouth daily    Stenosis of carotid artery, unspecified laterality        cyclobenzaprine 10 MG tablet    FLEXERIL    90 tablet    Take 1 tablet (10 mg) by mouth 3 times daily as needed for muscle spasms    Cervical stenosis of spinal canal       hydrOXYzine 25 MG tablet    ATARAX    120 tablet    Take 1-2 tablets (25-50 mg) by mouth every 6 hours as needed for itching    Chronic pruritus       metoprolol 100 MG 24 hr tablet    TOPROL-XL    180 tablet    Take 1 tablet (100 mg) by mouth daily    Benign essential hypertension       mycophenolate 500 MG tablet    GENERIC EQUIVALENT     Take 1 tablet (500 mg) by mouth 4 times daily        omeprazole 20 MG tablet     180 tablet    Take 2 tablets (40 mg) by mouth daily    Gastrointestinal hemorrhage with melena       predniSONE 10 MG tablet    DELTASONE    18 tablet    3 po qd x 3 days then 2 po qd x 3 days then 1 qd x 3 days    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 TAKE AS PRESCRIBED BY INR CLINIC. CURRENT DOSE IS 7.5 MG FOR 2 DAYS AND 5 MG FOR 5 DAYS.    Long-term (current) use of anticoagulants, Pulmonary embolism, bilateral (H), Paroxysmal atrial fibrillation (H)

## 2017-10-12 ENCOUNTER — TELEPHONE (OUTPATIENT)
Dept: FAMILY MEDICINE | Facility: CLINIC | Age: 82
End: 2017-10-12

## 2017-10-12 NOTE — TELEPHONE ENCOUNTER
Patient is agreeable to seeing derm again () if he is able to get in tomorrow.  I called to expedite appointment; OFFICE IS CLOSED until a.m.    Call early a.m to 's office: [phone] and try to get patient in to see someone in derm Friday.   Patient is aware that we are working on this. Will cancel appt with PCP if we can get him in to derm.  He definitely needs to be seen.    Analisa Vigil RN

## 2017-10-12 NOTE — TELEPHONE ENCOUNTER
Reason for call:  Patient reporting a symptom    Symptom or request: Pt getting blisters, just started medication for foot.    Duration (how long have symptoms been present): A week    Have you been treated for this before? Yes    Additional comments: Pt would like to be contacted to discuss    Phone Number patient can be reached at:  Home number on file [phone] (home)    Best Time:  Anytime    Can we leave a detailed message on this number:  YES    Call taken on 10/12/2017 at 11:53 AM by Caroline Scott

## 2017-10-12 NOTE — TELEPHONE ENCOUNTER
Yes, there is opportunity for infection if blisters are open - and he should bandage these and use topical bacitracin.      I would recommend team office visit either today or tomorrow    Bucky Boone MD

## 2017-10-12 NOTE — TELEPHONE ENCOUNTER
This sounds like something that he might benefit from being seen in the skin care clinic - I think that would make more sense.    Bucky Boone MD

## 2017-10-12 NOTE — TELEPHONE ENCOUNTER
"Spoke with pt:   Saw PCP 10/9/17 for contact dermatitis and eczema - started prednisone  Also Soaking foot with Epson salt 30 mins  Blisters and weeping still present   Sores are wide open on left foot on all toes - blisters with raw skin/drainage   Left foot is worse than right foot   Using Triamcinolone and Gold Bond creams   Only improvement is \"pimples\" have cleared up  Pt is worried about area getting infected because sores are wide open and is wondering if he should be using a topical antibiotic? Also wondering what to do as toes/feet do not seem to be improving.     Please advise.     Yamilex EVERETT RN    "

## 2017-10-12 NOTE — TELEPHONE ENCOUNTER
"Reached patient. He ONLY wants to see  so that he can \"compare\" to his recent OV. (I added on to a.m.schedule)    I advised NO Triamcinolone cream at this time (never use on fragile/open skin). He has Bacitracin per PCP recommendation. Also avoid 30 minute soaks today, as it sounds like the skin is already very friable.  Wrap legs or use clean cotton socks (whatever allows him to wear shoes and walk safely)  Appointment tomorrow.  Analisa Vigil RN      "

## 2017-10-13 ENCOUNTER — TRANSFERRED RECORDS (OUTPATIENT)
Dept: HEALTH INFORMATION MANAGEMENT | Facility: CLINIC | Age: 82
End: 2017-10-13

## 2017-10-13 NOTE — TELEPHONE ENCOUNTER
Patient will see  at 11:15 am today!    Cancelled appointment with PCP.  He states he is marginally better today; feels that stopping the foot soaks have allowed the skin to firm up a bit.    ZAHRA Hernandez.    Analisa Vigil RN

## 2017-10-13 NOTE — TELEPHONE ENCOUNTER
I spoke with Malia at 's office.  She is checking with him about working this patient into schedule today. Await call back.  If they can see him, we will cancel appointment today with .  Patient will need to be notified of NEW appointment by either Malia or [place] nurse.  Analisa Vigil RN

## 2017-10-13 NOTE — TELEPHONE ENCOUNTER
Attempted to reach derm clinic.   Clinic does not open until 8:30am-4:30pm    RECALL    Alisha Meza RN

## 2017-10-14 DIAGNOSIS — K92.1 GASTROINTESTINAL HEMORRHAGE WITH MELENA: ICD-10-CM

## 2017-10-14 NOTE — TELEPHONE ENCOUNTER
omeprazole 20 MG tablet      Last Written Prescription Date: 4/03/17  Last Fill Quantity: 180,  # refills: 1   Last Office Visit with [facility], [facility] or [facility] prescribing provider: 10/09/17                                                 Thao HINTON)

## 2017-10-30 ENCOUNTER — ANTICOAGULATION THERAPY VISIT (OUTPATIENT)
Dept: NURSING | Facility: CLINIC | Age: 82
End: 2017-10-30
Payer: COMMERCIAL

## 2017-10-30 DIAGNOSIS — I48.0 PAROXYSMAL ATRIAL FIBRILLATION (H): ICD-10-CM

## 2017-10-30 DIAGNOSIS — I26.99 PULMONARY EMBOLISM, BILATERAL (H): ICD-10-CM

## 2017-10-30 DIAGNOSIS — Z79.01 LONG-TERM (CURRENT) USE OF ANTICOAGULANTS: ICD-10-CM

## 2017-10-30 LAB — INR POINT OF CARE: 2.5 (ref 0.86–1.14)

## 2017-10-30 PROCEDURE — 36416 COLLJ CAPILLARY BLOOD SPEC: CPT

## 2017-10-30 PROCEDURE — 99207 ZZC NO CHARGE NURSE ONLY: CPT

## 2017-10-30 PROCEDURE — 85610 PROTHROMBIN TIME: CPT | Mod: QW

## 2017-10-30 NOTE — MR AVS SNAPSHOT
Hermilo MADRID Eva   10/30/2017 11:00 AM   Anticoagulation Therapy Visit    Description:  84 year old male   Provider:   ANTICOAGULATION CLINIC   Department:  [facility] Nurse           INR as of 10/30/2017     Today's INR 2.5      Anticoagulation Summary as of 10/30/2017     INR goal 2.0-3.0   Today's INR 2.5   Full instructions 5 mg every day   Next INR check 11/29/2017    Indications   Long-term (current) use of anticoagulants [Z79.01] [Z79.01]  Pulmonary embolism  bilateral (H) [I26.99]  Paroxysmal atrial fibrillation (H) [I48.0]         Your next Anticoagulation Clinic appointment(s)     Nov 29, 2017 11:30 AM CST   Anticoagulation Visit with  ANTICOAGULATION CLINIC   [facility]    [address]  [place] [address]   [phone]              Contact Numbers     Clinic Number:         October 2017 Details    Sun Mon Tue Wed Thu Fri Sat     1               2               3               4               5               6               7                 8               9               10               11               12               13               14                 15               16               17               18               19               20               21                 22               23               24               25               26               27               28                 29               30      5 mg   See details      31      5 mg              Date Details   10/30 This INR check               How to take your warfarin dose     To take:  5 mg Take 1 of the 5 mg tablets.           November 2017 Details    Sun Mon Tue Wed Thu Fri Sat        1      5 mg         2      5 mg         3      5 mg         4      5 mg           5      5 mg         6      5 mg         7      5 mg         8      5 mg         9      5 mg         10      5 mg         11      5 mg           12      5 mg         13      5 mg         14      5 mg         15      5 mg          16      5 mg         17      5 mg         18      5 mg           19      5 mg         20      5 mg         21      5 mg         22      5 mg         23      5 mg         24      5 mg         25      5 mg           26      5 mg         27      5 mg         28      5 mg         29            30                  Date Details   No additional details    Date of next INR:  11/29/2017         How to take your warfarin dose     To take:  5 mg Take 1 of the 5 mg tablets.

## 2017-10-30 NOTE — PROGRESS NOTES
ANTICOAGULATION FOLLOW-UP CLINIC VISIT    Patient Name:  Hermilo Velasquez  Date:  10/30/2017  Contact Type:  Face to Face    SUBJECTIVE:     Patient Findings     Positives No Problem Findings           OBJECTIVE    INR Protime   Date Value Ref Range Status   10/30/2017 2.5 (A) 0.86 - 1.14 Final       ASSESSMENT / PLAN  INR assessment THER    Recheck INR In: 4 WEEKS    INR Location Clinic      Anticoagulation Summary as of 10/30/2017     INR goal 2.0-3.0   Today's INR 2.5   Maintenance plan 5 mg (5 mg x 1) every day   Full instructions 5 mg every day   Weekly total 35 mg   Weekly max dose 45 mg   Plan last modified Janneth Allen RN (8/22/2017)   Next INR check 11/29/2017   Target end date Indefinite    Indications   Long-term (current) use of anticoagulants [Z79.01] [Z79.01]  Pulmonary embolism  bilateral (H) [I26.99]  Paroxysmal atrial fibrillation (H) [I48.0]         Anticoagulation Episode Summary     INR check location [facility]    Preferred lab     Send INR reminders to [facility] ANTICOAGULATION    Comments       Anticoagulation Care Providers     Provider Role Specialty Phone number    Bucky Boone MD [facility] Internal Medicine [phone]            See the Encounter Report to view Anticoagulation Flowsheet and Dosing Calendar (Go to Encounters tab in chart review, and find the Anticoagulation Therapy Visit)    Dosage adjustment made based on physician directed care plan. INR 2.5.  Continue same dosing and recheck 4 weeks.    Analisa Vigil RN

## 2017-11-24 ENCOUNTER — OFFICE VISIT (OUTPATIENT)
Dept: FAMILY MEDICINE | Facility: CLINIC | Age: 82
End: 2017-11-24
Payer: COMMERCIAL

## 2017-11-24 VITALS
TEMPERATURE: 98.2 F | BODY MASS INDEX: 25.41 KG/M2 | HEART RATE: 62 BPM | HEIGHT: 70 IN | WEIGHT: 177.5 LBS | OXYGEN SATURATION: 93 % | SYSTOLIC BLOOD PRESSURE: 113 MMHG | DIASTOLIC BLOOD PRESSURE: 64 MMHG

## 2017-11-24 DIAGNOSIS — L12.0 BULLOUS PEMPHIGOID (H): Primary | ICD-10-CM

## 2017-11-24 DIAGNOSIS — Z79.01 LONG-TERM (CURRENT) USE OF ANTICOAGULANTS: ICD-10-CM

## 2017-11-24 DIAGNOSIS — I27.82 OTHER CHRONIC PULMONARY EMBOLISM WITHOUT ACUTE COR PULMONALE (H): ICD-10-CM

## 2017-11-24 PROCEDURE — 99214 OFFICE O/P EST MOD 30 MIN: CPT | Performed by: INTERNAL MEDICINE

## 2017-11-24 RX ORDER — PREDNISONE 20 MG/1
TABLET ORAL
Qty: 25 TABLET | Refills: 0 | Status: SHIPPED | OUTPATIENT
Start: 2017-11-24 | End: 2018-01-10

## 2017-11-24 RX ORDER — DOXYCYCLINE 100 MG/1
100 CAPSULE ORAL 2 TIMES DAILY
Qty: 60 CAPSULE | Refills: 3 | Status: SHIPPED | OUTPATIENT
Start: 2017-11-24 | End: 2018-01-10

## 2017-11-24 NOTE — PROGRESS NOTES
SUBJECTIVE:   Hermilo Velasquez is a 85 year old male who presents to clinic today for the following health issues:      Rash      Duration: 6 months    Description  Location: bilateral side and back  Itching: moderate    Intensity:  moderate    Accompanying signs and symptoms: None    History (similar episodes/previous evaluation): been going on for awhile     Precipitating or alleviating factors:  New exposures:  None  Recent travel: no      Therapies tried and outcome: Triamcinolone Cream      This is an 85-year-old man who has had a skin rash for 6 months. He has seen several physicians regarding this rash and tried several treatments. He did have a skin biopsy performed by  suggesting bullous pemphigoid. The patient states that he did get symptom relief when taking CellCept for this condition. However, he did not tolerate taking CellCept due to dizziness and other nonspecific side effects. He stopped taking CellCept several months ago. His rash has worsened. Currently, he is treating his rash with triamcinolone cream. The triamcinolone cream improved symptoms, but does not adequately address symptoms. The rash is severely pruritic. The patient denies any oral mucosal involvement of his rash. Rash is localized to his chest, bilateral flanks and back. He remembers trying doxycycline for the rash in the past, but cannot recall why he stopped that drug. He states that he did not have any side effects from the doxycycline. He states that he got the most symptom relief when he took prednisone for his rash. He also saw another dermatologist (Mitzy), who did not feel that his rash was consistent with bullous pemphigoid, and suggested topical steroids for symptom management. He was to follow-up with Dr. Forrester, but he has trouble getting in to see Dr. Forrester.   Of note, the patient is on Coumadin for history of pulmonary embolism.    Problem list and histories reviewed & adjusted, as indicated.  Additional  history: as documented    Patient Active Problem List   Diagnosis     Low back pain     Ventral hernia     Nonrheumatic aortic valve stenosis     GERD (gastroesophageal reflux disease)     Non Hodgkin's lymphoma (H): 2013     Microscopic hematuria     Health Care Home     CARDIOVASCULAR SCREENING; LDL GOAL LESS THAN 130     History of colonic polyps     Neuropathy     Anemia due to blood loss, acute     Physical deconditioning     Paroxysmal atrial fibrillation (H)     Need for SBE (subacute bacterial endocarditis) prophylaxis     RBBB (right bundle branch block)     Gastrointestinal hemorrhage with melena     Primary osteoarthritis of both knees     Primary osteoarthritis of left hip     Pulmonary embolism, bilateral (H)     S/P IVC filter     Long-term (current) use of anticoagulants [Z79.01]     Benign neoplasm of colon, unspecified part of colon     Pulmonary nodules     Benign neoplasm of colon     Primary osteoarthritis of left knee     Essential hypertension with goal blood pressure less than 140/90     Non-Hodgkin's lymphoma, unspecified body region, unspecified non-Hodgkin lymphoma type (H)     Anticoagulated on Coumadin     Other chronic pulmonary embolism without acute cor pulmonale (H)     Epistaxis     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     Aftercare following left knee joint replacement surgery     Rash     Drainage from wound: left Knee     Lower extremity edema     Leg edema, left     S/P total knee arthroplasty     ACP (advance care planning)     Past Surgical History:   Procedure Laterality Date     APPENDECTOMY       AS TOTAL KNEE ARTHROPLASTY       ESOPHAGOSCOPY, GASTROSCOPY, DUODENOSCOPY (EGD), COMBINED N/A 11/28/2015    Procedure: COMBINED ESOPHAGOSCOPY, GASTROSCOPY, DUODENOSCOPY (EGD);  Surgeon: Danis Castillo MD;  Location:  GI     HERNIA REPAIR  2006     HERNIORRHAPHY VENTRAL  4/17/2013    Procedure: HERNIORRHAPHY VENTRAL;  VENTRAL HERNIA REPAIR WITH MESH;  Surgeon:  Patel Guzman MD;  Location: [facility] OR     KNEE SURGERY      arthroscopic right knee surgery      REPAIR LIGAMENT ANKLE  2/23/2012    Procedure:REPAIR LIGAMENT ANKLE; LEFT TARSAL TUNNEL RELEASE OF KNOT OF ARIEL RELEASE; Surgeon:SAUL PUENTE; Location:[facility] OR     REPLACE VALVE AORTIC N/A 9/3/2015    Procedure: REPLACE VALVE AORTIC;  Surgeon: Antonino Mitchell MD;  Location: [facility] OR     ROTATOR CUFF REPAIR RT/LT      bilateral     SPINE SURGERY      3 spine surgeries     TONSILLECTOMY       TURP         Social History   Substance Use Topics     Smoking status: Former Smoker     Packs/day: 2.00     Years: 55.00     Quit date: 1/22/1998     Smokeless tobacco: Never Used     Alcohol use 0.0 oz/week     0 Standard drinks or equivalent per week      Comment: occassionaly     Family History   Problem Relation Age of Onset     C.A.D. Father      Emphysema Father          Current Outpatient Prescriptions   Medication Sig Dispense Refill     predniSONE (DELTASONE) 20 MG tablet 2 tablet once daily for 1 week, then 1 tablet once daily for 1 week, the 0.5 tablets once daily for one week. 25 tablet 0     doxycycline (VIBRAMYCIN) 100 MG capsule Take 1 capsule (100 mg) by mouth 2 times daily 60 capsule 3     omeprazole (PRILOSEC) 20 MG CR capsule TAKE TWO CAPSULES BY MOUTH DAILY  180 capsule 2     predniSONE (DELTASONE) 10 MG tablet 3 po qd x 3 days then 2 po qd x 3 days then 1 qd x 3 days 18 tablet 0     hydrOXYzine (ATARAX) 25 MG tablet Take 1-2 tablets (25-50 mg) by mouth every 6 hours as needed for itching 120 tablet 3     warfarin (COUMADIN) 5 MG tablet TAKE 1 TO 1.5 TABLETS (5 TO 7.5 MG) BY MOUTH DAILY. TAKE AS PRESCRIBED BY INR CLINIC. CURRENT DOSE IS 7.5 MG FOR 2 DAYS AND 5 MG FOR 5 DAYS. 140 tablet 0     cyclobenzaprine (FLEXERIL) 10 MG tablet Take 1 tablet (10 mg) by mouth 3 times daily as needed for muscle spasms 90 tablet 0     mycophenolate (CELLCEPT - GENERIC EQUIVALENT) 500 MG tablet Take 1 tablet  "(500 mg) by mouth 4 times daily       atorvastatin (LIPITOR) 10 MG tablet Take 1 tablet (10 mg) by mouth daily 90 tablet 3     triamcinolone (KENALOG) 0.1 % cream Apply topically 2 times daily       metoprolol (TOPROL-XL) 100 MG 24 hr tablet Take 1 tablet (100 mg) by mouth daily 180 tablet 3     Allergies   Allergen Reactions     Lidocaine Blisters     Allergy to lidocaine ointment     Lasix [Furosemide] Rash     Penicillins Rash     \"broke out from injection\" 60 yrs ago           Reviewed and updated as needed this visit by clinical staff     Reviewed and updated as needed this visit by Provider         ROS:  No chest pains cough, shortness of breath, blood in stools, blood in urine    OBJECTIVE:     /64 (BP Location: Right arm, Cuff Size: Adult Regular)  Pulse 62  Temp 98.2  F (36.8  C) (Oral)  Ht 5' 10\" (1.778 m)  Wt 177 lb 8 oz (80.5 kg)  SpO2 93%  BMI 25.47 kg/m2  Body mass index is 25.47 kg/(m^2).  Gen.: This is a well-appearing elderly man familiar to me from my previous practice. He does not appear toxic. He appears quite comfortable. Skin: There is a diffuse erythematous macular/papular eruption over the chest, back, bilateral flanks. There are no bobby bullea that I can appreciate. I do not see any mucosal involvement.      ASSESSMENT/PLAN:         ICD-10-CM    1. 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 Long-term (current) use of anticoagulants [Z79.01] Z79.01 CANCELED: INR   3. Other chronic pulmonary embolism without acute cor pulmonale (H) I27.82 CANCELED: INR     This is an 85-year-old man with a rash that has been present for 6 months. He has a skin biopsy was consistent with bullous pemphigoid. His rash is not typical for bullous pemphigoid as there are no obvious bullae on exam. However, his symptoms did improve with treatments for bullous pemphigoid in the past. Unfortunately, he does not tolerate mycophenolate. After " some discussion, we decided to restart prednisone for immediate symptom relief given that his symptoms are currently severe. In addition, we decided to restart doxycycline which she tolerated well in the past. Hopefully, after he tapers off prednisone, the doxycycline will provide management of the bullous pemphigoid disease that is necessary upon stopping prednisone. He will follow-up with me in 4-5 weeks to assess therapy, sooner if symptoms worsen. The doxycycline will cause his INR to elevate, as such, I recommended that he have an INR check on Monday and make a dose adjustment to his Coumadin accordingly. He understood these directions. If the doxycycline doesn't help enough, I do think we will need to reinvolve a dermatologist in the management of his bullous pemphigoid disease or to reconfirm the diagnosis and or rule out other diagnoses to explain his persistent rash.    Total time greater than 28 minutes majority in face-to-face contact coordinating care.      FUTURE APPOINTMENTS:       - INR check on Monday, office visit appointment with me in 4-5 weeks or sooner if needed    Karson Bishop MD  [facility]

## 2017-11-24 NOTE — MR AVS SNAPSHOT
After Visit Summary   11/24/2017    Hermilo Velasquez    MRN: [id number]           Patient Information     Date Of Birth          11/3/1932        Visit Information        Provider Department      11/24/2017 3:30 PM Karson Bishop MD [facility]        Today's Diagnoses     Bullous pemphigoid    -  1    Long-term (current) use of anticoagulants [Z79.01]        Other chronic pulmonary embolism without acute cor pulmonale (H)           Follow-ups after your visit        Your next 10 appointments already scheduled     Nov 27, 2017 11:45 AM CST   Anticoagulation Visit with [facility]   [facility] ([facility]    [address] [id number] [phone]            Jak 10, 2018 11:30 AM CST   Office Visit with Karson Bishop MD   [facility] ([facility])    [address] [facility] [id number] [phone]           Bring a current list of meds and any records pertaining to this visit. For Physicals, please bring immunization records and any forms needing to be filled out. Please arrive 10 minutes early to complete paperwork.              Who to contact     If you have questions or need follow up information about today's clinic visit or your schedule please contact [facility] directly at [phone].  Normal or non-critical lab and imaging results will be communicated to you by MyChart, letter or phone within 4 business days after the clinic has received the results. If you do not hear from us within 7 days, please contact the clinic through MyChart or phone. If you have a critical or abnormal lab result, we will notify you by phone as soon as possible.  Submit refill requests through [employer] or call your pharmacy and they will forward the refill request to us. Please allow 3 business days for your refill to be completed.          Additional Information About Your Visit        MyChart Information      "[employer] lets you send messages to your doctor, view your test results, renew your prescriptions, schedule appointments and more. To sign up, go to [email or web address] . Click on \"Log in\" on the left side of the screen, which will take you to the Welcome page. Then click on \"Sign up Now\" on the right side of the page.     You will be asked to enter the access code listed below, as well as some personal information. Please follow the directions to create your username and password.     Your access code is: [email or web address]  Expires: 2018  1:21 PM     Your access code will  in 90 days. If you need help or a new code, please call your [facility] clinic or [phone].        Care EveryWhere ID     This is your Care EveryWhere ID. This could be used by other organizations to access your [facility] medical records  [id number]        Your Vitals Were     Pulse Temperature Height Pulse Oximetry BMI (Body Mass Index)       62 98.2  F (36.8  C) (Oral) 5' 10\" (1.778 m) 93% 25.47 kg/m2        Blood Pressure from Last 3 Encounters:   17 113/64   10/09/17 137/74   17 116/72    Weight from Last 3 Encounters:   17 177 lb 8 oz (80.5 kg)   10/09/17 170 lb (77.1 kg)   17 170 lb 6.4 oz (77.3 kg)              Today, you had the following     No orders found for display         Today's Medication Changes          These changes are accurate as of: 17  4:18 PM.  If you have any questions, ask your nurse or doctor.               Start taking these medicines.        Dose/Directions    doxycycline 100 MG capsule   Commonly known as:  VIBRAMYCIN   Used for:  Bullous pemphigoid   Started by:  Karson Bishop MD        Dose:  100 mg   Take 1 capsule (100 mg) by mouth 2 times daily   Quantity:  60 capsule   Refills:  3         These medicines have changed or have updated prescriptions.        Dose/Directions    * predniSONE 10 MG tablet   Commonly known as:  DELTASONE   This may have changed:  Another " medication with the same name was added. Make sure you understand how and when to take each.   Used for:  Contact dermatitis and eczema due to detergents   Changed by:  Bucky Boone MD        3 po qd x 3 days then 2 po qd x 3 days then 1 qd x 3 days   Quantity:  18 tablet   Refills:  0       * predniSONE 20 MG tablet   Commonly known as:  DELTASONE   This may have changed:  You were already taking a medication with the same name, and this prescription was added. Make sure you understand how and when to take each.   Used for:  Bullous pemphigoid   Changed by:  Karson Bishop MD        2 tablet once daily for 1 week, then 1 tablet once daily for 1 week, the 0.5 tablets once daily for one week.   Quantity:  25 tablet   Refills:  0       * Notice:  This list has 2 medication(s) that are the same as other medications prescribed for you. Read the directions carefully, and ask your doctor or other care provider to review them with you.         Where to get your medicines      These medications were sent to [id number] - [place], [place] - [address]  [address] [address]     Phone:  [phone]     doxycycline 100 MG capsule    predniSONE 20 MG tablet                Primary Care Provider Office Phone # Fax #    Bucky Boone [phone] [address] [address]        Equal Access to Services     [facility] : Hadii hakeem ku hadasho Soahmetali, waaxda luqadaha, qaybta kaalmada adecamilayada, jf salazar . So [facility] [phone].    ATENCIÓN: Si habla español, tiene a randle disposición servicios gratuitos de asistencia lingüística. Swati al [phone].    We comply with applicable federal civil rights laws and [place] laws. We do not discriminate on the basis of race, color, national origin, age, disability, sex, sexual orientation, or gender identity.            Thank you!     Thank you for  choosing [facility]  for your care. Our goal is always to provide you with excellent care. Hearing back from our patients is one way we can continue to improve our services. Please take a few minutes to complete the written survey that you may receive in the mail after your visit with us. Thank you!             Your Updated Medication List - Protect others around you: Learn how to safely use, store and throw away your medicines at www.disposemymeds.org.          This list is accurate as of: 11/24/17  4:18 PM.  Always use your most recent med list.                   Brand Name Dispense Instructions for use Diagnosis    atorvastatin 10 MG tablet    LIPITOR    90 tablet    Take 1 tablet (10 mg) by mouth daily    Stenosis of carotid artery, unspecified laterality       cyclobenzaprine 10 MG tablet    FLEXERIL    90 tablet    Take 1 tablet (10 mg) by mouth 3 times daily as needed for muscle spasms    Cervical stenosis of spinal canal       doxycycline 100 MG capsule    VIBRAMYCIN    60 capsule    Take 1 capsule (100 mg) by mouth 2 times daily    Bullous pemphigoid       hydrOXYzine 25 MG tablet    ATARAX    120 tablet    Take 1-2 tablets (25-50 mg) by mouth every 6 hours as needed for itching    Chronic pruritus       metoprolol 100 MG 24 hr tablet    TOPROL-XL    180 tablet    Take 1 tablet (100 mg) by mouth daily    Benign essential hypertension       mycophenolate 500 MG tablet    GENERIC EQUIVALENT     Take 1 tablet (500 mg) by mouth 4 times daily        omeprazole 20 MG CR capsule    priLOSEC    180 capsule    TAKE TWO CAPSULES BY MOUTH DAILY    Gastrointestinal hemorrhage with melena       * predniSONE 10 MG tablet    DELTASONE    18 tablet    3 po qd x 3 days then 2 po qd x 3 days then 1 qd x 3 days    Contact dermatitis and eczema due to detergents       * predniSONE 20 MG tablet    DELTASONE    25 tablet    2 tablet once daily for 1 week, then 1 tablet once daily for 1 week, the 0.5 tablets once  daily for one week.    Bullous pemphigoid       triamcinolone 0.1 % cream    KENALOG     Apply topically 2 times daily        warfarin 5 MG tablet    COUMADIN    140 tablet    TAKE 1 TO 1.5 TABLETS (5 TO 7.5 MG) BY MOUTH DAILY. TAKE AS PRESCRIBED BY INR CLINIC. CURRENT DOSE IS 7.5 MG FOR 2 DAYS AND 5 MG FOR 5 DAYS.    Long-term (current) use of anticoagulants, Pulmonary embolism, bilateral (H), Paroxysmal atrial fibrillation (H)       * Notice:  This list has 2 medication(s) that are the same as other medications prescribed for you. Read the directions carefully, and ask your doctor or other care provider to review them with you.

## 2017-11-24 NOTE — NURSING NOTE
"Chief Complaint   Patient presents with     Derm Problem       Initial /64 (BP Location: Right arm, Cuff Size: Adult Regular)  Pulse 62  Temp 98.2  F (36.8  C) (Oral)  Ht 5' 10\" (1.778 m)  Wt 177 lb 8 oz (80.5 kg)  SpO2 93%  BMI 25.47 kg/m2 Estimated body mass index is 25.47 kg/(m^2) as calculated from the following:    Height as of this encounter: 5' 10\" (1.778 m).    Weight as of this encounter: 177 lb 8 oz (80.5 kg).  Medication Reconciliation: complete     NAVEED Arzate      "

## 2017-11-27 ENCOUNTER — ANTICOAGULATION THERAPY VISIT (OUTPATIENT)
Dept: NURSING | Facility: CLINIC | Age: 82
End: 2017-11-27
Payer: COMMERCIAL

## 2017-11-27 DIAGNOSIS — I48.0 PAROXYSMAL ATRIAL FIBRILLATION (H): ICD-10-CM

## 2017-11-27 DIAGNOSIS — I26.99 PULMONARY EMBOLISM, BILATERAL (H): ICD-10-CM

## 2017-11-27 DIAGNOSIS — Z79.01 LONG-TERM (CURRENT) USE OF ANTICOAGULANTS: ICD-10-CM

## 2017-11-27 LAB — INR POINT OF CARE: 2.1 (ref 0.86–1.14)

## 2017-11-27 PROCEDURE — 85610 PROTHROMBIN TIME: CPT | Mod: QW

## 2017-11-27 PROCEDURE — 99207 ZZC NO CHARGE NURSE ONLY: CPT

## 2017-11-27 PROCEDURE — 36416 COLLJ CAPILLARY BLOOD SPEC: CPT

## 2017-11-27 NOTE — PROGRESS NOTES
ANTICOAGULATION FOLLOW-UP CLINIC VISIT    Patient Name:  Hermilo Velasquez  Date:  11/27/2017  Contact Type:  Face to Face    SUBJECTIVE:     Patient Findings     Comments Started new meds: Doxycycline and Prednisone 11-24-17 for DX: Bullous pemphigoid   Will recheck INR 12-19-17.   Also scheduled future INR appointment 1-10-18 to coordinate with Dr Edna GALDAMEZ.            OBJECTIVE    INR Protime   Date Value Ref Range Status   11/27/2017 2.1 (A) 0.86 - 1.14 Final       ASSESSMENT / PLAN  INR assessment THER    Recheck INR In: 3 WEEKS    INR Location Clinic      Anticoagulation Summary as of 11/27/2017     INR goal 2.0-3.0   Today's INR 2.1   Maintenance plan 5 mg (5 mg x 1) every day   Full instructions 5 mg every day   Weekly total 35 mg   Weekly max dose 45 mg   No change documented Evie Mckeon RN   Plan last modified Janneth Allen RN (8/22/2017)   Next INR check 12/19/2017   Target end date Indefinite    Indications   Long-term (current) use of anticoagulants [Z79.01] [Z79.01]  Pulmonary embolism  bilateral (H) [I26.99]  Paroxysmal atrial fibrillation (H) [I48.0]         Anticoagulation Episode Summary     INR check location [facility]    Preferred lab     Send INR reminders to [facility]    Comments       Anticoagulation Care Providers     Provider Role Specialty Phone number    Bucky Boone MD [facility] Internal Medicine [phone]            See the Encounter Report to view Anticoagulation Flowsheet and Dosing Calendar (Go to Encounters tab in chart review, and find the Anticoagulation Therapy Visit)    Dosage adjustment made based on physician directed care plan.    Evie Mckeon RN

## 2017-11-27 NOTE — MR AVS SNAPSHOT
Hermilo MERCY Velasquez   11/27/2017 11:45 AM   Anticoagulation Therapy Visit    Description:  85 year old male   Provider:   ANTICOAGULATION CLINIC   Department:   Nurse           INR as of 11/27/2017     Today's INR 2.1      Anticoagulation Summary as of 11/27/2017     INR goal 2.0-3.0   Today's INR 2.1   Full instructions 5 mg every day   Next INR check 12/19/2017    Indications   Long-term (current) use of anticoagulants [Z79.01] [Z79.01]  Pulmonary embolism  bilateral (H) [I26.99]  Paroxysmal atrial fibrillation (H) [I48.0]         Your next Anticoagulation Clinic appointment(s)     Dec 19, 2017 11:15 AM CST   Anticoagulation Visit with  ANT[facility]AGULATION CLINIC   [facility]    [address]  [place] [address]   [phone]            Jak 10, 2018 11:00 AM CST   Anticoagulation Visit with  ANTICOAGULATION CLINIC   [facility]    [address]  [place] [address]   [phone]              Contact Numbers     Clinic Number:         November 2017 Details    Sun Mon Tue Wed Thu Fri Sat        1               2               3               4                 5               6               7               8               9               10               11                 12               13               14               15               16               17               18                 19               20               21               22               23               24               25                 26               27      5 mg   See details      28      5 mg         29      5 mg         30      5 mg            Date Details   11/27 This INR check               How to take your warfarin dose     To take:  5 mg Take 1 of the 5 mg tablets.           December 2017 Details    Sun Mon Tue Wed Thu Fri Sat          1      5 mg         2      5 mg           3      5 mg         4      5 mg         5      5 mg         6      5  mg         7      5 mg         8      5 mg         9      5 mg           10      5 mg         11      5 mg         12      5 mg         13      5 mg         14      5 mg         15      5 mg         16      5 mg           17      5 mg         18      5 mg         19            20               21               22               23                 24               25               26               27               28               29               30                 31                      Date Details   No additional details    Date of next INR:  12/19/2017         How to take your warfarin dose     To take:  5 mg Take 1 of the 5 mg tablets.

## 2017-12-12 ENCOUNTER — TELEPHONE (OUTPATIENT)
Dept: FAMILY MEDICINE | Facility: CLINIC | Age: 82
End: 2017-12-12

## 2017-12-12 DIAGNOSIS — L12.9 PEMPHIGOID (H): Primary | ICD-10-CM

## 2017-12-12 RX ORDER — PREDNISONE 20 MG/1
20 TABLET ORAL DAILY
Qty: 30 TABLET | Refills: 1 | Status: SHIPPED | OUTPATIENT
Start: 2017-12-12 | End: 2018-01-10

## 2017-12-12 NOTE — TELEPHONE ENCOUNTER
Patient has been informed and agreeable to plan.  Last office visit and referral faxed to Dr. Alexander's office.  Janneth Allen RN

## 2017-12-12 NOTE — TELEPHONE ENCOUNTER
Reason for call:  Patient reporting a symptom    Symptom or request: patient has had a bad rash.  Has been taking prednisone and rash improved.  He is now tapering down the medication (1/2 tablet per day), and   The rash is getting worse again.  Wondering if he should increase his daily dosage?    Duration (how long have symptoms been present): ongoing-over 6 months    Have you been treated for this before? Yes    Additional comments: please call to advise    Phone Number patient can be reached at:  Home number on file [phone] (home)    Best Time:  anytime    Can we leave a detailed message on this number:  YES    Call taken on 12/12/2017 at 11:58 AM by Karen Moraes  .

## 2017-12-12 NOTE — TELEPHONE ENCOUNTER
FYI:  Patient is still taking doxy.  He is agreeable to see Dr. Alexander.  Do you want him to continue Doxy until seen by Dr. Alexander?  Thank you.  Janneth Allen RN

## 2017-12-12 NOTE — TELEPHONE ENCOUNTER
To Dr. Hunter:  Patient saw you last for his rash.  Since tapering down to 1/2 tab (10 mg) of prednisone, Rash flared again.  Mostly on patient's trunk.  Very itchy.  Patient is asking if Prednisone should be increased again.  Also, should he see Dermatologist at the [facility]?  Please advise.  Thank you.  Janneth Allen, RN

## 2017-12-12 NOTE — TELEPHONE ENCOUNTER
Is he taking the doxycycline?    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  Usually, medications like cellcept are administered by a specialist.  He could go back to Dr. Forrester or Dr. Sinha, another option is Dr. Rosalia Alexander in our building.  Dr. Alexander is a [facility]  quality dermatologist without the parking hassle of going to the  of .  I placed a referral to Dr. Alexander, you can give him contact information.  Until he sees dermatology, he can stay on prednisone 20mg daily.  Rx sent.  He has an appointment to see me in early January.

## 2017-12-19 ENCOUNTER — ANTICOAGULATION THERAPY VISIT (OUTPATIENT)
Dept: NURSING | Facility: CLINIC | Age: 82
End: 2017-12-19
Payer: COMMERCIAL

## 2017-12-19 DIAGNOSIS — Z79.01 LONG-TERM (CURRENT) USE OF ANTICOAGULANTS: ICD-10-CM

## 2017-12-19 DIAGNOSIS — I26.99 PULMONARY EMBOLISM, BILATERAL (H): ICD-10-CM

## 2017-12-19 DIAGNOSIS — I48.0 PAROXYSMAL ATRIAL FIBRILLATION (H): ICD-10-CM

## 2017-12-19 LAB — INR POINT OF CARE: 2 (ref 0.86–1.14)

## 2017-12-19 PROCEDURE — 85610 PROTHROMBIN TIME: CPT | Mod: QW

## 2017-12-19 PROCEDURE — 36416 COLLJ CAPILLARY BLOOD SPEC: CPT

## 2017-12-19 PROCEDURE — 99207 ZZC NO CHARGE NURSE ONLY: CPT

## 2017-12-19 NOTE — MR AVS SNAPSHOT
Hermilo MADRID Eva   12/19/2017 11:15 AM   Anticoagulation Therapy Visit    Description:  85 year old male   Provider:   ANTICOAGULATION CLINIC   Department:   Nurse           INR as of 12/19/2017     Today's INR 2.0      Anticoagulation Summary as of 12/19/2017     INR goal 2.0-3.0   Today's INR 2.0   Full instructions 5 mg every day   Next INR check 1/16/2018    Indications   Long-term (current) use of anticoagulants [Z79.01] [Z79.01]  Pulmonary embolism  bilateral (H) [I26.99]  Paroxysmal atrial fibrillation (H) [I48.0]         Your next Anticoagulation Clinic appointment(s)     Jak 10, 2018 11:00 AM CST   An[facility]    [address]  [place] [address]   [phone]            Jan 16, 2018 11:15 AM CST   Anticoagulation Visit with  [facility] CLINIC   [facility]    [address]  [place] [address]   [phone]              Contact Numbers     Clinic Number:         December 2017 Details    Sun Mon Tue Wed Thu Fri Sat          1               2                 3               4               5               6               7               8               9                 10               11               12               13               14               15               16                 17               18               19      5 mg   See details      20      5 mg         21      5 mg         22      5 mg         23      5 mg           24      5 mg         25      5 mg         26      5 mg         27      5 mg         28      5 mg         29      5 mg         30      5 mg           31      5 mg                Date Details   12/19 This INR check               How to take your warfarin dose     To take:  5 mg Take 1 of the 5 mg tablets.           January 2018 Details    Sun Mon Tue Wed Thu Fri Sat      1      5 mg         2      5 mg         3      5 mg          4      5 mg         5      5 mg         6      5 mg           7      5 mg         8      5 mg         9      5 mg         10      5 mg         11      5 mg         12      5 mg         13      5 mg           14      5 mg         15      5 mg         16            17               18               19               20                 21               22               23               24               25               26               27                 28               29               30               31                   Date Details   No additional details    Date of next INR:  1/16/2018         How to take your warfarin dose     To take:  5 mg Take 1 of the 5 mg tablets.

## 2017-12-19 NOTE — PROGRESS NOTES
ANTICOAGULATION FOLLOW-UP CLINIC VISIT    Patient Name:  Hermilo Velasquez  Date:  12/19/2017  Contact Type:  Face to Face    SUBJECTIVE:        OBJECTIVE    INR Protime   Date Value Ref Range Status   12/19/2017 2.0 (A) 0.86 - 1.14 Final       ASSESSMENT / PLAN  INR assessment THER    Recheck INR In: 4 WEEKS    INR Location Clinic      Anticoagulation Summary as of 12/19/2017     INR goal 2.0-3.0   Today's INR 2.0   Maintenance plan 5 mg (5 mg x 1) every day   Full instructions 5 mg every day   Weekly total 35 mg   Weekly max dose 45 mg   Plan last modified Janneth Allen RN (8/22/2017)   Next INR check 1/16/2018   Target end date Indefinite    Indications   Long-term (current) use of anticoagulants [Z79.01] [Z79.01]  Pulmonary embolism  bilateral (H) [I26.99]  Paroxysmal atrial fibrillation (H) [I48.0]         Anticoagulation Episode Summary     INR check location [facility]    Preferred lab     Send INR reminders to [facility]    Comments       Anticoagulation Care Providers     Provider Role Specialty Phone number    Bucky Boone MD [facility] [phone]            See the Encounter Report to view Anticoagulation Flowsheet and Dosing Calendar (Go to Encounters tab in chart review, and find the Anticoagulation Therapy Visit)    Patient still on 20 mg of prednisone daily for rash.  Also missed 1 dose of coumadin. Dosing based on [facility] Protocol and Provider directed care plan.      Janneth Allen RN

## 2017-12-22 ENCOUNTER — APPOINTMENT (OUTPATIENT)
Age: 82
Setting detail: DERMATOLOGY
End: 2018-01-05

## 2017-12-22 ENCOUNTER — TRANSFERRED RECORDS (OUTPATIENT)
Dept: HEALTH INFORMATION MANAGEMENT | Facility: CLINIC | Age: 82
End: 2017-12-22

## 2017-12-22 DIAGNOSIS — L12.0 BULLOUS PEMPHIGOID: ICD-10-CM

## 2017-12-22 DIAGNOSIS — L82.0 INFLAMED SEBORRHEIC KERATOSIS: ICD-10-CM

## 2017-12-22 PROBLEM — L30.9 DERMATITIS, UNSPECIFIED: Status: ACTIVE | Noted: 2017-12-22

## 2017-12-22 PROCEDURE — 99203 OFFICE O/P NEW LOW 30 MIN: CPT | Mod: 25

## 2017-12-22 PROCEDURE — OTHER DEFER: OTHER

## 2017-12-22 PROCEDURE — 11101: CPT

## 2017-12-22 PROCEDURE — OTHER BIOPSY BY PUNCH METHOD: OTHER

## 2017-12-22 PROCEDURE — OTHER TREATMENT REGIMEN: OTHER

## 2017-12-22 PROCEDURE — 11100: CPT

## 2017-12-22 PROCEDURE — OTHER MIPS QUALITY: OTHER

## 2017-12-22 PROCEDURE — OTHER COUNSELING: OTHER

## 2017-12-22 ASSESSMENT — LOCATION ZONE DERM
LOCATION ZONE: TRUNK
LOCATION ZONE: NECK

## 2017-12-22 ASSESSMENT — LOCATION DETAILED DESCRIPTION DERM
LOCATION DETAILED: RIGHT CLAVICULAR NECK
LOCATION DETAILED: RIGHT LATERAL ABDOMEN
LOCATION DETAILED: RIGHT INFERIOR MEDIAL MIDBACK
LOCATION DETAILED: LEFT LATERAL ABDOMEN
LOCATION DETAILED: LEFT CLAVICULAR NECK

## 2017-12-22 ASSESSMENT — LOCATION SIMPLE DESCRIPTION DERM
LOCATION SIMPLE: RIGHT ANTERIOR NECK
LOCATION SIMPLE: RIGHT LOWER BACK
LOCATION SIMPLE: LEFT ANTERIOR NECK
LOCATION SIMPLE: ABDOMEN

## 2017-12-22 NOTE — HPI: SECONDARY COMPLAINT
How Severe Is This Condition?: moderate
Additional History: The patient notes that Dr. Lewis froze \"a bunch of these, but they did not go away.\"  His concern is that these are frequently itchy and irritated.

## 2017-12-22 NOTE — PROCEDURE: MIPS QUALITY
Quality 265: Biopsy Follow-Up: Biopsy results reviewed, communicated, tracked, and documented
Detail Level: Detailed
Quality 226: Preventive Care And Screening: Tobacco Use: Screening And Cessation Intervention: Patient screened for tobacco and is an ex-smoker
Quality 110: Preventive Care And Screening: Influenza Immunization: Influenza Immunization previously received during influenza season
Quality 130: Documentation Of Current Medications In The Medical Record: Current Medications Documented
Quality 431: Preventive Care And Screening: Unhealthy Alcohol Use - Screening: Patient screened for unhealthy alcohol use using a single question and scores less than 2 times per year
Quality 131: Pain Assessment And Follow-Up: Pain assessment using a standardized tool is documented as negative, no follow-up plan required

## 2017-12-22 NOTE — HPI: RASH
How Severe Is Your Rash?: moderate
Is This A New Presentation, Or A Follow-Up?: Rash
Additional History: He is on warfarin for a heart valve repair.

## 2017-12-22 NOTE — PROCEDURE: TREATMENT REGIMEN
Detail Level: Simple
Continue Regimen: Continue QD Prednisone 20mg\\nDoxycycline 100mg\\nBID use of Triamcinolone lotion

## 2017-12-22 NOTE — PROCEDURE: BIOPSY BY PUNCH METHOD
Anesthesia Type: 0.5% lidocaine with 1:400,000 epinephrine
Hemostasis: Ligature
Wound Care: Vaseline
Patient Will Remove Sutures At Home?: No
Home Suture Removal Text: Patient was provided a home suture removal kit and will remove their sutures at home.  If they have any questions or difficulties they will call the office.
Render Post-Care Instructions In Note?: yes
Number Of Epidermal Sutures (Optional): 2
Notification Instructions: Patient will be notified of biopsy results. However, patient instructed to call the office if not contacted within 2 weeks.
Suture Removal: 14 days
Post-Care Instructions: I reviewed with the patient in detail post-care instructions. Patient is to keep the biopsy site dry overnight, and then apply H2O2 and vaseline daily until healed.
Anesthesia Volume In Cc (Will Not Render If 0): 0.3
Biopsy Type: H and E
Size Of Lesion In Cm (Optional): 0
Consent: Written consent was obtained and risks were reviewed including but not limited to scarring, infection, bleeding, scabbing, incomplete removal, nerve damage and allergy to anesthesia.
Punch Size In Mm: 3
Epidermal Sutures: 5-0 Monosof
Biopsy Type: DIF
Path Notes (To The Dermatopathologist): Currently on 20mg Prednisone a day.
Billing Type: Third-Party Bill
Path Notes (To The Dermatopathologist): Currently on 20mg Prednisone a day
Body Location Override (Optional - Billing Will Still Be Based On Selected Body Map Location If Applicable): R lateral abdomen
Detail Level: Detailed
Body Location Override (Optional - Billing Will Still Be Based On Selected Body Map Location If Applicable): Right flank
Dressing: pressure dressing with telfa
Post-Care Instructions: I reviewed with the patient in detail post-care instructions. Patient is to keep the biopsy site dry overnight, and then apply H2O2 and vaseline  daily until healed.

## 2017-12-28 DIAGNOSIS — I26.99 PULMONARY EMBOLISM, BILATERAL (H): ICD-10-CM

## 2017-12-28 DIAGNOSIS — Z79.01 LONG-TERM (CURRENT) USE OF ANTICOAGULANTS: ICD-10-CM

## 2017-12-28 DIAGNOSIS — I10 BENIGN ESSENTIAL HYPERTENSION: ICD-10-CM

## 2017-12-28 DIAGNOSIS — I48.0 PAROXYSMAL ATRIAL FIBRILLATION (H): ICD-10-CM

## 2017-12-28 RX ORDER — METOPROLOL SUCCINATE 100 MG/1
100 TABLET, EXTENDED RELEASE ORAL
Qty: 180 TABLET | Refills: 0 | Status: SHIPPED | OUTPATIENT
Start: 2017-12-28 | End: 2018-01-10

## 2017-12-28 NOTE — TELEPHONE ENCOUNTER
Pt calling to check on status of Rx. Informed pt we received request from pharmacy 25 minutes ago. Pt states he only has two pills left.

## 2017-12-28 NOTE — TELEPHONE ENCOUNTER
RN called patient to verify he is taking Metoprolol Succinate  mg two times daily. Patient verified that is the dosing. RN will fill script for 90 days.

## 2017-12-29 NOTE — TELEPHONE ENCOUNTER
Requested Prescriptions   Pending Prescriptions Disp Refills     warfarin (COUMADIN) 5 MG tablet [Pharmacy Med Name: Warfarin Sodium Oral Tablet 5 MG] 140 tablet 0     Sig: take 1 to 1.5 tablets (5 to 7.5mg) by mouth daily as directed by INR clinic. Current dose is 7.5mg for 2 daays and 5mg for 5 days per week.    Vitamin K Antagonists Failed    12/28/2017  1:26 PM       Failed - INR is within goal in the past 6 weeks    Confirm INR is within goal in the past 6 weeks.     Recent Labs   Lab Test 12/19/17   INR  2.0*                      Passed - Recent or future visit with authorizing provider's specialty    Patient had office visit in the last year or has a visit in the next 30 days with authorizing provider.  See chart review.              Passed - Patient is 18 years of age or older        warfarin (COUMADIN) 5 MG tablet 140 tablet 0 8/17/2017     Last Written Prescription Date:  08/17/2017  Last Fill Quantity: 140,  # refills: 0   Last Office Visit with [facility] or [facility] prescribing provider:  11/24/2017   Future Office Visit:  01/10/2018  Next 5 appointments (look out 90 days)     Jak 10, 2018 11:30 AM CST   [facility] Visit with Karson Bishop MD   [facility]    [address] [id number] [phone]            Feb 07, 2018 10:45 AM CST   Return Visit with Tab Grijalva MD   [facility] ([facility]    [address]  [place] [facility] [place] [facility] [address] [phone]

## 2017-12-30 DIAGNOSIS — Z79.01 LONG-TERM (CURRENT) USE OF ANTICOAGULANTS: ICD-10-CM

## 2017-12-30 DIAGNOSIS — I26.99 PULMONARY EMBOLISM, BILATERAL (H): ICD-10-CM

## 2017-12-30 DIAGNOSIS — I48.0 PAROXYSMAL ATRIAL FIBRILLATION (H): ICD-10-CM

## 2017-12-30 DIAGNOSIS — L12.0 BULLOUS PEMPHIGOID (H): ICD-10-CM

## 2017-12-30 RX ORDER — DOXYCYCLINE 100 MG/1
100 CAPSULE ORAL 2 TIMES DAILY
Qty: 60 CAPSULE | Refills: 3 | Status: CANCELLED | OUTPATIENT
Start: 2017-12-30

## 2017-12-30 RX ORDER — WARFARIN SODIUM 5 MG/1
TABLET ORAL
Qty: 90 TABLET | Refills: 0 | Status: SHIPPED | OUTPATIENT
Start: 2017-12-30 | End: 2018-01-10

## 2017-12-30 NOTE — TELEPHONE ENCOUNTER
Requested Prescriptions   Pending Prescriptions Disp Refills     warfarin (COUMADIN) 5 MG tablet  Patient is requesting 90 day supply.     Last Written Prescription Date:  8/17/17  Last Fill Quantity: 140 tablet,  # refills: 0   Last Office Visit with [facility] or [facility] prescribing provider:  11/24/17   Future Office Visit:    Next 5 appointments (look out 90 days)     Jak 10, 2018 11:30 AM CST   Office Visit with Karson Bishop MD   [facility]    [address] [address]   [phone]            Feb 07, 2018 10:45 AM CST   Return Visit with Tab Grijalva MD   [facility] ([facility]    [address] [address] [phone]                140 tablet 0    Vitamin K Antagonists Failed    12/30/2017  9:17 AM       Failed - INR is within goal in the past 6 weeks    Confirm INR is within goal in the past 6 weeks.     Recent Labs   Lab Test 12/19/17   INR  2.0*          Passed - Recent or future visit with authorizing provider's specialty    Patient had office visit in the last year or has a visit in the next 30 days with authorizing provider.  See chart review.          Passed - Patient is 18 years of age or older        doxycycline (VIBRAMYCIN) 100 MG capsule  Patient is requesting 90 day supply.         Last Written Prescription Date:  11/24/17  Last Fill Quantity: 60 capsule,   # refills: 3  Last Office Visit: 11/24/17  Future Office visit:    Next 5 appointments (look out 90 days)     Jak 10, 2018 11:30 AM CST   Office Visit with Karson Bishop MD   [facility]    [address] [address] [phone]            Feb 07, 2018 10:45 AM CST   Return Visit with Tab Grijalva MD   [facility] ([facility])    [id number]  [facility] [address] [phone]                    Routing refill request to provider for review/approval because:  Drug not on the [facility] or [facility] refill protocol or controlled substance   60 capsule 3     Sig: Take 1 capsule (100 mg) by mouth 2 times daily    There is no refill protocol information for this order

## 2018-01-02 RX ORDER — WARFARIN SODIUM 5 MG/1
TABLET ORAL
Qty: 50 TABLET | Refills: 0 | Status: SHIPPED | OUTPATIENT
Start: 2018-01-02 | End: 2018-01-10

## 2018-01-02 NOTE — TELEPHONE ENCOUNTER
The patient called and said that 90 is not enough medication   He needs the 140 tables that we normally send for   The patient did  the 90 and he needs it to be dated for the same date in 2017 for the insurance   Please send the rest of the 140 tablets

## 2018-01-02 NOTE — TELEPHONE ENCOUNTER
BI  Pt would like to get additional 50 tabs of coumadin, even though the last few months he has only been taking 5 mg daily. Has already picked up 90 tabs, would like the additional 50.    Thank you,  Miracle Moraes RN

## 2018-01-04 ENCOUNTER — TRANSFERRED RECORDS (OUTPATIENT)
Dept: HEALTH INFORMATION MANAGEMENT | Facility: CLINIC | Age: 83
End: 2018-01-04

## 2018-01-04 ENCOUNTER — APPOINTMENT (OUTPATIENT)
Age: 83
Setting detail: DERMATOLOGY
End: 2018-01-05

## 2018-01-04 DIAGNOSIS — Z48.02 ENCOUNTER FOR REMOVAL OF SUTURES: ICD-10-CM

## 2018-01-04 DIAGNOSIS — L27.0 GENERALIZED SKIN ERUPTION DUE TO DRUGS AND MEDICAMENTS TAKEN INTERNALLY: ICD-10-CM

## 2018-01-04 PROCEDURE — OTHER SUTURE REMOVAL (GLOBAL PERIOD): OTHER

## 2018-01-04 PROCEDURE — OTHER COUNSELING: OTHER

## 2018-01-04 PROCEDURE — 99213 OFFICE O/P EST LOW 20 MIN: CPT

## 2018-01-04 PROCEDURE — 99024 POSTOP FOLLOW-UP VISIT: CPT

## 2018-01-04 PROCEDURE — OTHER MIPS QUALITY: OTHER

## 2018-01-04 ASSESSMENT — LOCATION ZONE DERM: LOCATION ZONE: TRUNK

## 2018-01-04 ASSESSMENT — BSA RASH: BSA RASH: 5

## 2018-01-04 ASSESSMENT — LOCATION SIMPLE DESCRIPTION DERM
LOCATION SIMPLE: RIGHT LOWER BACK
LOCATION SIMPLE: ABDOMEN

## 2018-01-04 ASSESSMENT — LOCATION DETAILED DESCRIPTION DERM
LOCATION DETAILED: RIGHT LATERAL ABDOMEN
LOCATION DETAILED: RIGHT SUPERIOR LATERAL LOWER BACK

## 2018-01-04 ASSESSMENT — PAIN INTENSITY VAS: HOW INTENSE IS YOUR PAIN 0 BEING NO PAIN, 10 BEING THE MOST SEVERE PAIN POSSIBLE?: NO PAIN

## 2018-01-04 ASSESSMENT — SEVERITY ASSESSMENT: SEVERITY: MILD

## 2018-01-04 NOTE — TELEPHONE ENCOUNTER
This is too soon for refill, and additional qty not needed- as already ran through insurance, too late to change.  Pt takes 5mg day so 90 day rx is appropriate.  Per pharmacy, Pt paid for this 90 day rx.  Next rx would be due in 90 days for 90 tablets.  Explained this to pt, and that if we needed to, we can adjust rx in future, but would not be appropriate to add 50 tabs as has been denied by insurance.  Pt reported cost for 3 month supply was $300, but then upon request for clarification x3, pt states it's $14 for 90 tablets.    No further questions at this time, pt understands plan.  Dione Giron RN

## 2018-01-04 NOTE — PROCEDURE: SUTURE REMOVAL (GLOBAL PERIOD)
Add 33306 Cpt? (Important Note: In 2017 The Use Of 24624 Is Being Tracked By Cms To Determine Future Global Period Reimbursement For Global Periods): yes
Detail Level: Simple

## 2018-01-04 NOTE — TELEPHONE ENCOUNTER
So if possible can an RN call and give a verbal for the Rx from 1/2 to have the same date as the first Rx for Coumadin 12/30/2017  This is for his insurance otherwise this will cost a lot of money out of pocket for the 2nd Rx

## 2018-01-09 RX ORDER — WARFARIN SODIUM 5 MG/1
TABLET ORAL
Qty: 140 TABLET | Refills: 0 | Status: CANCELLED | OUTPATIENT
Start: 2018-01-09

## 2018-01-09 NOTE — TELEPHONE ENCOUNTER
Doxycycline 90 day Rx pended   Routing refill request to provider for review/approval because:  Drug not on the [facility] refill protocol       Yamilex EVERETT RN

## 2018-01-09 NOTE — PROGRESS NOTES
SUBJECTIVE:   Hermilo Velasquez is a 85 year old male who presents to clinic today for the following health issues:      Follow up Rash      Saw Dr. 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  Additional history: as documented    Patient Active Problem List   Diagnosis     Low back pain     Ventral hernia     Nonrheumatic aortic valve stenosis     GERD (gastroesophageal reflux disease)     Non Hodgkin's lymphoma (H): 2013     Microscopic hematuria     Health Care Home     CARDIOVASCULAR SCREENING; LDL GOAL LESS THAN 130     History of colonic polyps     Neuropathy     Anemia due to blood loss, acute     Physical deconditioning     Paroxysmal atrial fibrillation (H)     Need for SBE (subacute bacterial endocarditis) prophylaxis     RBBB (right bundle branch block)     Gastrointestinal hemorrhage with melena     Primary osteoarthritis of both knees     Primary osteoarthritis of left hip     Pulmonary embolism, bilateral (H)     S/P IVC filter     Long-term (current) use of anticoagulants [Z79.01]     Benign neoplasm of colon, unspecified part of colon     Pulmonary nodules     Benign neoplasm of colon     Primary osteoarthritis of left knee     Essential hypertension with goal blood pressure less than 140/90     Non-Hodgkin's lymphoma, unspecified body region, unspecified non-Hodgkin lymphoma type (H)     Anticoagulated on Coumadin     Other chronic pulmonary embolism without acute cor pulmonale (H)     Epistaxis     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     Aftercare following left knee joint replacement surgery     Rash     Drainage from wound: left Knee     Lower extremity edema     Leg edema, left     S/P total knee arthroplasty     ACP (advance care planning)     Hyperlipidemia  LDL goal <130     Past Surgical History:   Procedure Laterality Date     APPENDECTOMY       AS TOTAL KNEE ARTHROPLASTY       ESOPHAGOSCOPY, GASTROSCOPY, DUODENOSCOPY (EGD), COMBINED N/A 11/28/2015    Procedure: COMBINED ESOPHAGOSCOPY, GASTROSCOPY, DUODENOSCOPY (EGD);  Surgeon: Danis Castillo MD;  Location:  GI     HERNIA REPAIR  2006     HERNIORRHAPHY VENTRAL  4/17/2013    Procedure: HERNIORRHAPHY VENTRAL;  VENTRAL HERNIA REPAIR WITH MESH;  Surgeon: Patel Guzman MD;  Location:  OR     KNEE SURGERY      arthroscopic right knee surgery      REPAIR LIGAMENT ANKLE  2/23/2012    Procedure:REPAIR LIGAMENT ANKLE; LEFT TARSAL TUNNEL RELEASE OF KNOT OF ARIEL RELEASE; Surgeon:SAUL PUENTE; Location: OR     REPLACE VALVE AORTIC N/A 9/3/2015    Procedure: REPLACE VALVE AORTIC;  Surgeon: Antonino Mitchell MD;  Location:  OR     ROTATOR CUFF REPAIR RT/LT      bilateral     SPINE SURGERY      3 spine surgeries     TONSILLECTOMY       TURP         Social History   Substance Use Topics     Smoking status: Former Smoker     Packs/day: 2.00     Years: 55.00     Quit date: 1/22/1998     Smokeless tobacco: Never Used     Alcohol use 0.0 oz/week     0 Standard drinks or equivalent per week      Comment: occassionaly     Family History   Problem Relation Age of Onset     C.A.D. Father      Emphysema Father          Current Outpatient Prescriptions   Medication Sig Dispense Refill     metoprolol (TOPROL-XL) 100 MG 24 hr tablet Take 1 tablet (100 mg) by mouth daily 90 tablet 3     warfarin (COUMADIN) 5 MG tablet TAKE 1 TO 1.5 TABLETS (5 TO 7.5 MG) BY MOUTH DAILY. TAKE AS PRESCRIBED BY INR CLINIC. CURRENT DOSE IS 7.5 MG FOR 2 DAYS AND 5 MG FOR 5 DAYS. 140 tablet 0     omeprazole (PRILOSEC) 20 MG CR capsule TAKE TWO CAPSULES BY MOUTH DAILY  180 capsule 2     [DISCONTINUED] warfarin (COUMADIN) 5 MG tablet TAKE 1 TO 1.5 TABLETS (5 TO 7.5 MG) BY MOUTH DAILY. TAKE AS PRESCRIBED BY INR CLINIC. CURRENT  "DOSE IS 7.5 MG FOR 2 DAYS AND 5 MG FOR 5 DAYS. 50 tablet 0     [DISCONTINUED] warfarin (COUMADIN) 5 MG tablet Current dose in 5 mg daily. Dose adjusted per INR 90 tablet 0     [DISCONTINUED] metoprolol (TOPROL-XL) 100 MG 24 hr tablet Take 1 tablet (100 mg) by mouth 2 times daily 180 tablet 0     Allergies   Allergen Reactions     Lidocaine Blisters     Allergy to lidocaine ointment     Lasix [Furosemide] Rash     Penicillins Rash     \"broke out from injection\" 60 yrs ago           Reviewed and updated as needed this visit by clinical staff       Reviewed and updated as needed this visit by Provider         ROS:      He feels dizziness within 20 minutes of taking metoprolol every day  No falls or focal weakness or true vertigo symptoms    Constitutional, HEENT, cardiovascular, pulmonary, gi and gu systems are negative, except as otherwise noted.      OBJECTIVE:   /68 (BP Location: Left arm, Patient Position: Chair, Cuff Size: Adult Regular)  Pulse 58  Temp 97.3  F (36.3  C) (Tympanic)  Ht 5' 10\" (1.778 m)  Wt 186 lb (84.4 kg)  SpO2 93%  BMI 26.69 kg/m2  Body mass index is 26.69 kg/(m^2).  GENERAL: healthy, alert and no distress  SKIN: Improving rash on trunk    Diagnostic Test Results:  none     ASSESSMENT/PLAN:         ICD-10-CM    1. Drug eruption L27.0    2. Benign essential hypertension I10 metoprolol (TOPROL-XL) 100 MG 24 hr tablet   3. Paroxysmal atrial fibrillation (H) I48.0 warfarin (COUMADIN) 5 MG tablet   4. Hyperlipidemia LDL goal <130 E78.5        Patient Instructions   Since your rash did not improve with stopping omeprazole and your heartburn is not controlled with ranitidine (Zantac), I recommend that you restart omeprazole (Prilosec).    Since stopping omeprazole (Prilosec) did not resolve your rash, I think you should try stopping your cholesterol pill atorvastatin (Lipitor) to see if this improves your rash.    Since you feel dizziness after taking metoprolol, I recommend that you cut " your metoprolol dose in half from 100 mg twice daily to 100 mg once daily only.    Return to see me in 4 weeks to recheck your blood pressure and rash after making the above medication changes.      Consider alternative statin if rash improves with stopping atorvastatin  Or perhaps given his age, consider no statin therapy for primary prevention      Karson Bishop MD  [facility]

## 2018-01-10 ENCOUNTER — ANTICOAGULATION THERAPY VISIT (OUTPATIENT)
Dept: NURSING | Facility: CLINIC | Age: 83
End: 2018-01-10
Payer: COMMERCIAL

## 2018-01-10 ENCOUNTER — OFFICE VISIT (OUTPATIENT)
Dept: FAMILY MEDICINE | Facility: CLINIC | Age: 83
End: 2018-01-10
Payer: COMMERCIAL

## 2018-01-10 VITALS
HEART RATE: 58 BPM | SYSTOLIC BLOOD PRESSURE: 129 MMHG | DIASTOLIC BLOOD PRESSURE: 68 MMHG | OXYGEN SATURATION: 93 % | TEMPERATURE: 97.3 F | WEIGHT: 186 LBS | BODY MASS INDEX: 26.63 KG/M2 | HEIGHT: 70 IN

## 2018-01-10 DIAGNOSIS — I10 BENIGN ESSENTIAL HYPERTENSION: ICD-10-CM

## 2018-01-10 DIAGNOSIS — I48.0 PAROXYSMAL ATRIAL FIBRILLATION (H): ICD-10-CM

## 2018-01-10 DIAGNOSIS — E78.5 HYPERLIPIDEMIA LDL GOAL <130: ICD-10-CM

## 2018-01-10 DIAGNOSIS — Z79.01 LONG-TERM (CURRENT) USE OF ANTICOAGULANTS: ICD-10-CM

## 2018-01-10 DIAGNOSIS — L27.0 DRUG ERUPTION: Primary | ICD-10-CM

## 2018-01-10 DIAGNOSIS — I26.99 PULMONARY EMBOLISM, BILATERAL (H): ICD-10-CM

## 2018-01-10 LAB — INR POINT OF CARE: 2.5 (ref 0.86–1.14)

## 2018-01-10 PROCEDURE — 85610 PROTHROMBIN TIME: CPT | Mod: QW

## 2018-01-10 PROCEDURE — 99214 OFFICE O/P EST MOD 30 MIN: CPT | Performed by: INTERNAL MEDICINE

## 2018-01-10 PROCEDURE — 36416 COLLJ CAPILLARY BLOOD SPEC: CPT

## 2018-01-10 PROCEDURE — 99207 ZZC NO CHARGE NURSE ONLY: CPT

## 2018-01-10 RX ORDER — DOXYCYCLINE 100 MG/1
100 CAPSULE ORAL 2 TIMES DAILY
Qty: 180 CAPSULE | Refills: 0 | OUTPATIENT
Start: 2018-01-10

## 2018-01-10 RX ORDER — WARFARIN SODIUM 5 MG/1
TABLET ORAL
Qty: 140 TABLET | Refills: 0 | Status: SHIPPED | OUTPATIENT
Start: 2017-12-30 | End: 2018-07-23

## 2018-01-10 RX ORDER — METOPROLOL SUCCINATE 100 MG/1
100 TABLET, EXTENDED RELEASE ORAL DAILY
Qty: 90 TABLET | Refills: 3 | Status: ON HOLD | OUTPATIENT
Start: 2018-01-10 | End: 2018-07-30

## 2018-01-10 NOTE — NURSING NOTE
"Chief Complaint   Patient presents with     Follow up        Initial /68 (BP Location: Left arm, Patient Position: Chair, Cuff Size: Adult Regular)  Pulse 58  Temp 97.3  F (36.3  C) (Tympanic)  Ht 5' 10\" (1.778 m)  Wt 186 lb (84.4 kg)  SpO2 93%  BMI 26.69 kg/m2 Estimated body mass index is 26.69 kg/(m^2) as calculated from the following:    Height as of this encounter: 5' 10\" (1.778 m).    Weight as of this encounter: 186 lb (84.4 kg)..    BP completed using cuff size: regular  MEDICATIONS REVIEWED  SOCIAL AND FAMILY HX REVIEWED  Kusum Sandoval CMA  "

## 2018-01-10 NOTE — PATIENT INSTRUCTIONS
Since your rash did not improve with stopping omeprazole and your heartburn is not controlled with ranitidine (Zantac), I recommend that you restart omeprazole (Prilosec).    Since stopping omeprazole (Prilosec) did not resolve your rash, I think you should try stopping your cholesterol pill atorvastatin (Lipitor) to see if this improves your rash.    Since you feel dizziness after taking metoprolol, I recommend that you cut your metoprolol dose in half from 100 mg twice daily to 100 mg once daily only.    Return to see me in 4 weeks to recheck your blood pressure and rash after making the above medication changes.

## 2018-01-10 NOTE — TELEPHONE ENCOUNTER
"Notes below on \"90 day refills\" were regarding warfarin - doxy was added to this encounter to clarify.  Tried to call but phone busy, will recall later.  Dione Giron RN    "

## 2018-01-10 NOTE — MR AVS SNAPSHOT
Hermilo Velasquez   1/10/2018 11:15 AM   Anticoagulation Therapy Visit    Description:  85 year old male   Provider:   ANTICOAGULATION CLINIC   Department:  [facility] Nurse           INR as of 1/10/2018     Today's INR 2.5      Anticoagulation Summary as of 1/10/2018     INR goal 2.0-3.0   Today's INR 2.5   Full instructions 5 mg every day   Next INR check 2/21/2018    Indications   Long-term (current) use of anticoagulants [Z79.01] [Z79.01]  Pulmonary embolism  bilateral (H) [I26.99]  Paroxysmal atrial fibrillation (H) [I48.0]         Your next Anticoagulation Clinic appointment(s)     Feb 21, 2018 11:15 AM CST   Anticoagulation Visit with  ANTICOAGULATION CLINIC   [facility]    [address]  [place] [address]   [phone]              Contact Numbers     Clinic Number:         January 2018 Details    Sun Mon Tue Wed Thu Fri Sat      1               2               3               4               5               6                 7               8               9               10      5 mg   See details      11      5 mg         12      5 mg         13      5 mg           14      5 mg         15      5 mg         16      5 mg         17      5 mg         18      5 mg         19      5 mg         20      5 mg           21      5 mg         22      5 mg         23      5 mg         24      5 mg         25      5 mg         26      5 mg         27      5 mg           28      5 mg         29      5 mg         30      5 mg         31      5 mg             Date Details   01/10 This INR check               How to take your warfarin dose     To take:  5 mg Take 1 of the 5 mg tablets.           February 2018 Details    Sun Mon Tue Wed Thu Fri Sat         1      5 mg         2      5 mg         3      5 mg           4      5 mg         5      5 mg         6      5 mg         7      5 mg         8      5 mg         9      5 mg         10      5 mg           11      5 mg          12      5 mg         13      5 mg         14      5 mg         15      5 mg         16      5 mg         17      5 mg           18      5 mg         19      5 mg         20      5 mg         21            22               23               24                 25               26               27               28                   Date Details   No additional details    Date of next INR:  2/21/2018         How to take your warfarin dose     To take:  5 mg Take 1 of the 5 mg tablets.

## 2018-01-10 NOTE — MR AVS SNAPSHOT
After Visit Summary   1/10/2018    Hermilo Velasquez    MRN: [id number]           Patient Information     Date Of Birth          11/3/1932        Visit Information        Provider Department      1/10/2018 11:30 AM Karson Bishop MD [facility]        Today's Diagnoses     Benign essential hypertension    -  1    Drug eruption        Chronic vasomotor rhinitis        Paroxysmal atrial fibrillation (H)        Hyperlipidemia LDL goal <130          Care Instructions    Since your rash did not improve with stopping omeprazole and your heartburn is not controlled with ranitidine (Zantac), I recommend that you restart omeprazole (Prilosec).    Since stopping omeprazole (Prilosec) did not resolve your rash, I think you should try stopping your cholesterol pill atorvastatin (Lipitor) to see if this improves your rash.    Since you feel dizziness after taking metoprolol, I recommend that you cut your metoprolol dose in half from 100 mg twice daily to 100 mg once daily only.    Return to see me in 4 weeks to recheck your blood pressure and rash after making the above medication changes.            Follow-ups after your visit        Follow-up notes from your care team     Return in about 4 weeks (around 2/7/2018).      Your next 10 appointments already scheduled     Feb 07, 2018 10:45 AM CST   Return Visit with Tab Grijalva MD   [facility] ([facility])    [address] [place], [place] [address]  [place] [id number] [phone]            Feb 21, 2018 11:15 AM CST   Anticoagulation Visit with  ANTICOAGULATION CLINIC   [facility] [place] ([facility])    [address]  [place] [id number] [phone]              Who to contact     If you have questions or need follow up information about today's clinic visit or your schedule please contact [facility] directly at [phone].  Normal or non-critical lab and imaging results  "will be communicated to you by MyChart, letter or phone within 4 business days after the clinic has received the results. If you do not hear from us within 7 days, please contact the clinic through Honey or phone. If you have a critical or abnormal lab result, we will notify you by phone as soon as possible.  Submit refill requests through Honey or call your pharmacy and they will forward the refill request to us. Please allow 3 business days for your refill to be completed.          Additional Information About Your Visit        Honey Information     Honey lets you send messages to your doctor, view your test results, renew your prescriptions, schedule appointments and more. To sign up, go to [email or web address] . Click on \"Log in\" on the left side of the screen, which will take you to the Welcome page. Then click on \"Sign up Now\" on the right side of the page.     You will be asked to enter the access code listed below, as well as some personal information. Please follow the directions to create your username and password.     Your access code is: [email or web address]  Expires: 4/10/2018 12:04 PM     Your access code will  in 90 days. If you need help or a new code, please call your [facility] or [phone].        Care EveryWhere ID     This is your Care EveryWhere ID. This could be used by other organizations to access your [facility] medical records  [id number]        Your Vitals Were     Pulse Temperature Height Pulse Oximetry BMI (Body Mass Index)       58 97.3  F (36.3  C) (Tympanic) 5' 10\" (1.778 m) 93% 26.69 kg/m2        Blood Pressure from Last 3 Encounters:   01/10/18 129/68   17 113/64   10/09/17 137/74    Weight from Last 3 Encounters:   01/10/18 186 lb (84.4 kg)   17 177 lb 8 oz (80.5 kg)   10/09/17 170 lb (77.1 kg)              Today, you had the following     No orders found for display         Today's Medication Changes          These changes are accurate as of: 1/10/18 " 12:04 PM.  If you have any questions, ask your nurse or doctor.               These medicines have changed or have updated prescriptions.        Dose/Directions    metoprolol 100 MG 24 hr tablet   Commonly known as:  TOPROL-XL   This may have changed:  when to take this   Used for:  Benign essential hypertension   Changed by:  [place] Bishop MD        Dose:  100 mg   Take 1 tablet (100 mg) by mouth daily   Quantity:  90 tablet   Refills:  3       warfarin 5 MG tablet   Commonly known as:  COUMADIN   This may have changed:  Another medication with the same name was removed. Continue taking this medication, and follow the directions you see here.   Used for:  Long-term (current) use of anticoagulants, Pulmonary embolism, bilateral (H), Paroxysmal atrial fibrillation (H)   Changed by:  Bucky Boone MD        TAKE 1 TO 1.5 TABLETS (5 TO 7.5 MG) BY MOUTH DAILY. TAKE AS PRESCRIBED BY INR CLINIC. CURRENT DOSE IS 7.5 MG FOR 2 DAYS AND 5 MG FOR 5 DAYS.   Quantity:  50 tablet   Refills:  0         Stop taking these medicines if you haven't already. Please contact your care team if you have questions.     atorvastatin 10 MG tablet   Commonly known as:  LIPITOR   Stopped by:  [place]arson Bishop MD           cyclobenzaprine 10 MG tablet   Commonly known as:  FLEXERIL   Stopped by:  [place] Bishop MD           hydrOXYzine 25 MG tablet   Commonly known as:  ATARAX   Stopped by:  Karson Bishop MD           predniSONE 10 MG tablet   Commonly known as:  DELTASONE   Stopped by:  Karson Bishop MD           predniSONE 20 MG tablet   Commonly known as:  DELTASONE   Stopped by:  Karson Bishop MD           triamcinolone 0.1 % cream   Commonly known as:  KENALOG   Stopped by:  Karson Bishop MD                Where to get your medicines      These medications were sent to [id number] - [place], [place] - [address]  [address] [address]     Phone:  [phone]      metoprolol 100 MG 24 hr tablet                Primary Care Provider Office Phone # Fax #    Bucky Boone [phone] [address] [address]        Equal Access to Services     YUE CORDERO : Hadii hakeem ku hadwillo Soomaali, waaxda luqadaha, qaybta kaalmada adeegyada, jf demetrisin hayaan braincamila booker laParulisha . So [facility] [phone].    ATENCIÓN: Si habla español, tiene a randle disposición servicios gratuitos de asistencia lingüística. Llame al [phone].    We comply with applicable federal civil rights laws and [place] laws. We do not discriminate on the basis of race, color, national origin, age, disability, sex, sexual orientation, or gender identity.            Thank you!     Thank you for choosing [facility]  for your care. Our goal is always to provide you with excellent care. Hearing back from our patients is one way we can continue to improve our services. Please take a few minutes to complete the written survey that you may receive in the mail after your visit with us. Thank you!             Your Updated Medication List - Protect others around you: Learn how to safely use, store and throw away your medicines at www.disposemymeds.org.          This list is accurate as of: 1/10/18 12:04 PM.  Always use your most recent med list.                   Brand Name Dispense Instructions for use Diagnosis    metoprolol 100 MG 24 hr tablet    TOPROL-XL    90 tablet    Take 1 tablet (100 mg) by mouth daily    Benign essential hypertension       omeprazole 20 MG CR capsule    priLOSEC    180 capsule    TAKE TWO CAPSULES BY MOUTH DAILY    Gastrointestinal hemorrhage with melena       warfarin 5 MG tablet    COUMADIN    50 tablet    TAKE 1 TO 1.5 TABLETS (5 TO 7.5 MG) BY MOUTH DAILY. TAKE AS PRESCRIBED BY INR CLINIC. CURRENT DOSE IS 7.5 MG FOR 2 DAYS AND 5 MG FOR 5 DAYS.    Long-term (current) use of anticoagulants, Pulmonary embolism, bilateral (H), Paroxysmal  atrial fibrillation (H)

## 2018-01-10 NOTE — TELEPHONE ENCOUNTER
Dr. 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

## 2018-01-10 NOTE — PROGRESS NOTES
ANTICOAGULATION FOLLOW-UP CLINIC VISIT    Patient Name:  Hermilo Velasquez  Date:  1/10/2018  Contact Type:  Face to Face    SUBJECTIVE:     Patient Findings     Positives No Problem Findings           OBJECTIVE    INR Protime   Date Value Ref Range Status   01/10/2018 2.5 (A) 0.86 - 1.14 Final       ASSESSMENT / PLAN  INR assessment THER    Recheck INR In: 6 WEEKS    INR Location Clinic      Anticoagulation Summary as of 1/10/2018     INR goal 2.0-3.0   Today's INR 2.5   Maintenance plan 5 mg (5 mg x 1) every day   Full instructions 5 mg every day   Weekly total 35 mg   Weekly max dose 45 mg   No change documented Lin Hood, RN   Plan last modified Janneth Allen RN (8/22/2017)   Next INR check 2/21/2018   Target end date Indefinite    Indications   Long-term (current) use of anticoagulants [Z79.01] [Z79.01]  Pulmonary embolism  bilateral (H) [I26.99]  Paroxysmal atrial fibrillation (H) [I48.0]         Anticoagulation Episode Summary     INR check location [facility]    Preferred lab     Send INR reminders to [facility] ANTICOAGULATION    Comments       Anticoagulation Care Providers     Provider Role Specialty Phone number    Bucky Boone MD Responsible Internal Medicine [phone]            See the Encounter Report to view Anticoagulation Flowsheet and Dosing Calendar (Go to Encounters tab in chart review, and find the Anticoagulation Therapy Visit)    Dosage adjustment made based on physician directed care plan.    Lin Hood, CECILIA

## 2018-01-11 NOTE — TELEPHONE ENCOUNTER
Patient was seen by PCP yesterday.  Questions regarding doxy have already been addressed.  See notes for details.  Janneth Allen RN

## 2018-01-17 ENCOUNTER — HOSPITAL ENCOUNTER (OUTPATIENT)
Dept: ULTRASOUND IMAGING | Facility: CLINIC | Age: 83
Discharge: HOME | End: 2018-01-17
Attending: ORTHOPAEDIC SURGERY | Admitting: ORTHOPAEDIC SURGERY
Payer: COMMERCIAL

## 2018-01-17 DIAGNOSIS — M25.375 OTHER INSTABILITY, LEFT FOOT: ICD-10-CM

## 2018-01-17 DIAGNOSIS — S93.492S SPRAIN OF OTHER LIGAMENT OF LEFT ANKLE, SEQUELA: ICD-10-CM

## 2018-01-17 DIAGNOSIS — M25.372 OTHER INSTABILITY, LEFT ANKLE: ICD-10-CM

## 2018-01-17 DIAGNOSIS — M72.2 PLANTAR FASCIAL FIBROMATOSIS: ICD-10-CM

## 2018-01-17 DIAGNOSIS — M77.50 ENTHESOPATHY OF ANKLE AND TARSUS: ICD-10-CM

## 2018-01-17 DIAGNOSIS — S93.612S: ICD-10-CM

## 2018-01-17 DIAGNOSIS — M67.02 SHORT ACHILLES TENDON (ACQUIRED), LEFT ANKLE: ICD-10-CM

## 2018-01-17 DIAGNOSIS — M62.472 CONTRACTURE OF MUSCLE, LEFT ANKLE AND FOOT: ICD-10-CM

## 2018-01-17 PROCEDURE — 93924 LWR XTR VASC STDY BILAT: CPT

## 2018-01-25 ENCOUNTER — TELEPHONE (OUTPATIENT)
Dept: FAMILY MEDICINE | Facility: CLINIC | Age: 83
End: 2018-01-25

## 2018-01-25 NOTE — TELEPHONE ENCOUNTER
Reason for call:  Patient reporting a symptom    Symptom or request: Runny nose, fever, sore throat, mucus, slight fever    Duration (how long have symptoms been present): 4 days    Have you been treated for this before? No    Additional comments:     Phone Number patient can be reached at:  Home number on file [phone] (home)    Best Time:  Any    Can we leave a detailed message on this number:  YES    Call taken on 1/25/2018 at 1:39 PM by Mi Reyes

## 2018-01-25 NOTE — TELEPHONE ENCOUNTER
Spoke with patient.  Does not appear to be flu like symptoms.  I explained it may be URI/cold symptoms  Advised Mucinex, fluids, saline nasal spray, warm liquids.   Denies SOB and wheezing  Explained can take 14 days for symptoms to resolve.  Patient was hard to triage as he seemed confused by some of the questions I was asking.   Explained that if symptoms worsens or he develops fever to call clinic back.     Patient wanted me to still send message to Dr. Bishop or Dr. Boone for recommendation.   Anything additional that you would like to add to above recommendations.    Alisha Meza, RN          Influenza-Like Illness (JENNIFER) Protocol    Hermilo Velasquez      Age: 85 year old     YOB: 1932    Are you currently sick or have you had close contact with someone who is currently sick?   Yes, this patient is currently sick. 3-4 days ago    Adult Clinical Evaluation      Does the patient have any of the following?  Measured fever > 100 degrees Does not feel feverish. Does not own thermometer   Chills or feels very warm to the touch No   Cough Yes coughing yellow phlegm with nasal drainage   Sore throat Yes   Muscle/ body aches No   Headaches No   Fatigue (tiredness) Yes         Nursing Plan  Routed chart to provider    Provided home care instructions    General home care instruction:      Avoid contact with people in your household who are at increased risk for more severe complications of influenza (such as pregnant women or people who have a chronic health condition, for example diabetes, heart disease, asthma, or emphysema).    Stay home from work, school, childcare or other public places until your fever (37.8 degrees Celsius [100 degrees Fahrenheit]) has been gone for at least 24 hours, except to seek medical care. (Fever should be gone without the use of fever-reducing medications.) Use a surgical mask if available, or cover your mouth and nose with a tissue if possible if you need to seek medical  care. Contact your work place, school, or  as they may have longer exclusion times.    You may continue to shed virus after your fever is gone. Limit your contact with high-risk individuals for 10 days after your symptoms started and be especially careful to cover your coughs/sneezes and wash your hands.    Cover your cough and wash your hands often, and especially after coughing, sneezing, blowing your nose.    Drink plenty of fluids (such as water, broth, sports drinks, electrolyte beverages for children) to prevent dehydration.    Avoid tobacco and second hand smoke.    Get plenty of rest.    Use over-the-counter pain relievers as needed per  instructions.    Do not give aspirin (acetylsalicylic acid) or products that contain aspirin (e.g. bismuth subsalicylate - Pepto Bismol) to children or teenagers 18 years or younger.    A small number of people with influenza do not have fever. If you have respiratory symptoms and are at increased risk for complications of influenza, contact your health care provider to discuss these symptoms.    For parents of infants:    If possible, only family members who are not sick should care for infants.    Wash your hands with soap and water, or an alcohol-based hand rub (if your hands are not visibly soiled) before caring for your infant.    Cover your mouth and nose with a tissue when coughing or sneezing, and clean your hands.    Contact a health care provider to discuss your illness within 1-2 days if you are    Pregnant    Immunocompromised    Call 911 if you experience:    Difficulty breathing or shortness of breath    Pain or pressure in the chest    Confusion or less responsive than normal    Seizure activity: ongoing or stopped    Severe dehydration or signs of shock     Blue or dusky lips, skin, or nail beds    If further questions/concerns or if new symptoms develop, call your PCP or [facility] as soon as possible.    When to seek medical  attention    Contact your health care provider right away if you experience:    A painful sore throat accompanied by fever persists for more than 48 hours    Ear pain, sinus pain, persistent vomiting and/or diarrhea    Oral temperature greater than 104  Fahrenheit (40  Celsius)    Dehydration (e.g., mouth feeling dry, dizzy when sitting/standing, decreased urine output)    Severe or persistent vomiting; unable to keep fluids down    Improvement in flu-like symptoms (fever and cough or sore throat) but then return of fever and worse cough or sore throat    Not drinking enough fluid    Any other concerns not stated above      Additional educational resources include:    http://www.[employer].com    http://www.cdc.gov/flu/  Alisha Meza

## 2018-01-26 NOTE — TELEPHONE ENCOUNTER
I would agree with conservative treatment and antibiotics may be recommended if sinus infection is accompanied by a high temperature or persists longer than 2 weeks.      Bucky Boone MD

## 2018-01-26 NOTE — TELEPHONE ENCOUNTER
I received a PA request on the hydroxyzine hcl from pharmacy. I see this was d/c on 1/10/18 by you.    Should I just leave this one alone and let the pharmacy know this medication was discontinued? I did not see any note about this drug on the most recent visit note.    Thanks,    Valentin Brian, MAGDY

## 2018-02-06 DIAGNOSIS — L29.9 CHRONIC PRURITUS: ICD-10-CM

## 2018-02-06 RX ORDER — HYDROXYZINE HYDROCHLORIDE 25 MG/1
TABLET, FILM COATED ORAL
Qty: 120 TABLET | Refills: 2 | Status: SHIPPED | OUTPATIENT
Start: 2018-02-06 | End: 2018-07-04

## 2018-02-06 NOTE — TELEPHONE ENCOUNTER
"Requested Prescriptions   Pending Prescriptions Disp Refills     hydrOXYzine (ATARAX) 25 MG tablet [Pharmacy Med Name: HydrOXYzine HCl Oral Tablet 25 MG] 120 tablet 2     Sig: Take 1 to 2 tablets (25-50 mg) by mouth every 6 hours as needed for itching    Antihistamines Protocol Failed    2/6/2018 11:53 AM       Failed - Patient is 3-64 years of age    Apply weight-based dosing for peds patients age 3 - 12 years of age.    Forward request to provider for patients under the age of 3 or over the age of 64.         Passed - Recent or future visit with authorizing provider's specialty    Patient had office visit in the last year or has a visit in the next 30 days with authorizing provider.  See \"Patient Info\" tab in inbasket, or \"Choose Columns\" in Meds & Orders section of the refill encounter.                   Last Written Prescription Date:  ?  Last Fill Quantity: ?,   # refills: ?  Last Office Visit: 1/10/18 Edna  Future Office visit:    Next 5 appointments (look out 90 days)     Feb 07, 2018 10:45 AM CST   Return Visit with Tab Grijalva MD   [facility] ([facility])    [address] [place] [id number] [phone]                   Routing refill request to provider for review/approval because:  Drug not active on patient's medication list    "

## 2018-02-07 ENCOUNTER — OFFICE VISIT (OUTPATIENT)
Dept: CARDIOLOGY | Facility: CLINIC | Age: 83
End: 2018-02-07
Attending: INTERNAL MEDICINE
Payer: COMMERCIAL

## 2018-02-07 VITALS
WEIGHT: 182 LBS | BODY MASS INDEX: 26.05 KG/M2 | HEIGHT: 70 IN | HEART RATE: 71 BPM | DIASTOLIC BLOOD PRESSURE: 70 MMHG | SYSTOLIC BLOOD PRESSURE: 131 MMHG

## 2018-02-07 DIAGNOSIS — I48.0 PAROXYSMAL ATRIAL FIBRILLATION (H): ICD-10-CM

## 2018-02-07 DIAGNOSIS — Z95.2 S/P AVR: ICD-10-CM

## 2018-02-07 PROCEDURE — 99213 OFFICE O/P EST LOW 20 MIN: CPT | Performed by: INTERNAL MEDICINE

## 2018-02-07 NOTE — LETTER
2/7/2018      Karson Bishop MD  [facility] [address]  [facility] [address]      RE: Hermilo Velasquez       Dear Colleague,    I had the pleasure of seeing Hermilo Velasquez in the [facility].    Service Date: 02/07/2018      REASON FOR CLINIC VISIT:  Followup AVR.      HISTORY OF PRESENT ILLNESS:  Mr. Velasquez is a very pleasant 85-year-old gentleman with history of aortic valve stenosis, status post aortic valve replacement with a bioprosthetic valve in 2015 with pre-AVR coronary angiogram showing minimal coronary artery disease with paroxysmal atrial fibrillation, now on Coumadin with history of GI bleed in the past, but has been tolerating Coumadin quite well with no recurrence of GI bleed.  He also has history of bilateral PE, DVT.  At the time of PE, his RV function was reduced and subsequent echocardiogram showed RV function normalized.  The last echocardiogram in 07/2016 showed LVEF of 50-55% with mildly dilated RV, normal RV systolic function with normal gradients across the bioprosthetic aortic valve and no aortic regurgitation.  To be noted, the patient sees Dr. Kelly in our Cardiology Clinic.  I have seen him once in 07/2016 for a perioperative cardiovascular risk assessment for knee surgery.  Today, he is coming to see me for routine followup.  The patient tells me health wise he is feeling well.  He is recovering from an URI.  He denies any chest discomfort or shortness of breath at rest or with physical activity.  He is retired but he is fairly active at home.  He climbs stairs, about 20 steps several times a day with no symptoms.  He is on Toprol- mg daily.  I think the dose was recently decreased by his primary care physician due to dizziness.  He no longer feels dizzy.  His blood pressure is well controlled.  He is on Coumadin, which he is tolerating quite well.  Rhythm appears regular today on exam.  He is aware of predental workup  antibiotic prophylaxis.  He recently had an LEANDRA which was normal.      PHYSICAL EXAMINATION:   VITAL SIGNS:  Blood pressure 131/70, heart rate 71 and regular, weight 182 pounds, BMI 26.17.   GENERAL:  The patient appears pleasant, comfortable.   NECK:  Normal JVP, no bruit.   CARDIOVASCULAR SYSTEM:  S1, S2 normal, no murmur, rub or gallop.   RESPIRATORY SYSTEM:  Clear to auscultation bilaterally.     ABDOMEN:  Soft, nontender.   EXTREMITIES:  No pitting pedal edema.   NEUROLOGICAL:  Alert and oriented x3.   PSYCHIATRIC:  Normal affect.   SKIN:  No obvious rash.     HEENT:  No pallor or icterus.      IMPRESSION AND PLAN:  A pleasant 85-year-old gentleman with history of bioprosthetic aortic valve replacement in 2015 with pre-AVR coronary angiogram showing minimal coronary artery disease.  In fact, angiogram films were today personally reviewed by me as I was unable to find an official cath report.  Cardiac status-wise, he is doing quite well without any symptoms of angina or congestive heart failure.  Cardiac auscultation was benign.  He is tolerating Coumadin quite well.  He has a CHADS2-VASc score of 3 (age, hypertension).  He is aware of predental workup antibiotic prophylaxis.  If he continues to feel well, cardiac status-wise, he can be seen in the Cardiology Clinic in 1 year, sooner if he notices any change in clinical status, especially if any exertional related symptoms.         SANIYA ZEPEDA MD             D: 2018   T: 2018   MT: monty      Name:     CLYDE RODRIGUEZ   MRN:      [id number]        Account:      [id number]   :      1932           Service Date: 2018      Document: [id number]         Outpatient Encounter Prescriptions as of 2018   Medication Sig Dispense Refill     hydrOXYzine (ATARAX) 25 MG tablet Take 1 to 2 tablets (25-50 mg) by mouth every 6 hours as needed for itching 120 tablet 2     metoprolol (TOPROL-XL) 100 MG 24 hr tablet Take 1 tablet (100 mg) by mouth  daily 90 tablet 3     warfarin (COUMADIN) 5 MG tablet TAKE 1 TO 1.5 TABLETS (5 TO 7.5 MG) BY MOUTH DAILY. TAKE AS PRESCRIBED BY INR CLINIC. CURRENT DOSE IS 7.5 MG FOR 2 DAYS AND 5 MG FOR 5 DAYS. 140 tablet 0     omeprazole (PRILOSEC) 20 MG CR capsule TAKE TWO CAPSULES BY MOUTH DAILY  180 capsule 2     No facility-administered encounter medications on file as of 2/7/2018.        Again, thank you for allowing me to participate in the care of your patient.      Sincerely,    Tab Grijalva MD     [facility]

## 2018-02-07 NOTE — PROGRESS NOTES
"HPI and Plan:   See dictation(#[id number])    Orders Placed This Encounter   Procedures     Follow-Up with Cardiologist       No orders of the defined types were placed in this encounter.      There are no discontinued medications.      Encounter Diagnoses   Name Primary?     S/P AVR      Paroxysmal atrial fibrillation (H)        CURRENT MEDICATIONS:  Current Outpatient Prescriptions   Medication Sig Dispense Refill     hydrOXYzine (ATARAX) 25 MG tablet Take 1 to 2 tablets (25-50 mg) by mouth every 6 hours as needed for itching 120 tablet 2     metoprolol (TOPROL-XL) 100 MG 24 hr tablet Take 1 tablet (100 mg) by mouth daily 90 tablet 3     warfarin (COUMADIN) 5 MG tablet TAKE 1 TO 1.5 TABLETS (5 TO 7.5 MG) BY MOUTH DAILY. TAKE AS PRESCRIBED BY INR CLINIC. CURRENT DOSE IS 7.5 MG FOR 2 DAYS AND 5 MG FOR 5 DAYS. 140 tablet 0     omeprazole (PRILOSEC) 20 MG CR capsule TAKE TWO CAPSULES BY MOUTH DAILY  180 capsule 2       ALLERGIES     Allergies   Allergen Reactions     Lidocaine Blisters     Allergy to lidocaine ointment     Lasix [Furosemide] Rash     Penicillins Rash     \"broke out from injection\" 60 yrs ago         PAST MEDICAL HISTORY:  Past Medical History:   Diagnosis Date     Aortic stenosis     Severe AS, 9/2015 AVR - [facility] TRIFECTA Bovine bioprosthesis 25MM TF-25A     Atrial fibrillation (H)     9/2015 Paroxysmal post op Afib - discharged on Warfarin and a beta blocker     Deep vein thrombosis (H)      GERD (gastroesophageal reflux disease)      Heart murmur      Monoclonal gammopathy     plasmacyte prominent causing monoclonal gammopathy     Need for SBE (subacute bacterial endocarditis) prophylaxis      Neuropathy      Other and unspecified hyperlipidemia      Other malignant lymphomas     non hodgkin's lymphoma     RBBB (right bundle branch block)      Severe sepsis with acute organ dysfunction (H) 11/16/2015     Unspecified hereditary and idiopathic peripheral neuropathy        PAST SURGICAL HISTORY:  Past " Surgical History:   Procedure Laterality Date     APPENDECTOMY       AS TOTAL KNEE ARTHROPLASTY       ESOPHAGOSCOPY, GASTROSCOPY, DUODENOSCOPY (EGD), COMBINED N/A 11/28/2015    Procedure: COMBINED ESOPHAGOSCOPY, GASTROSCOPY, DUODENOSCOPY (EGD);  Surgeon: Danis Castillo MD;  Location:  GI     HERNIA REPAIR  2006     HERNIORRHAPHY VENTRAL  4/17/2013    Procedure: HERNIORRHAPHY VENTRAL;  VENTRAL HERNIA REPAIR WITH MESH;  Surgeon: Patel Guzman MD;  Location:  OR     KNEE SURGERY      arthroscopic right knee surgery      REPAIR LIGAMENT ANKLE  2/23/2012    Procedure:REPAIR LIGAMENT ANKLE; LEFT TARSAL TUNNEL RELEASE OF KNOT OF ARIEL RELEASE; Surgeon:SAUL PUENTE; Location: OR     REPLACE VALVE AORTIC N/A 9/3/2015    Procedure: REPLACE VALVE AORTIC;  Surgeon: Antonino Mitchell MD;  Location:  OR     ROTATOR CUFF REPAIR RT/LT      bilateral     SPINE SURGERY      3 spine surgeries     TONSILLECTOMY       TURP         FAMILY HISTORY:  Family History   Problem Relation Age of Onset     C.A.D. Father      Emphysema Father        SOCIAL HISTORY:  Social History     Social History     Marital status:      Spouse name: N/A     Number of children: N/A     Years of education: N/A     Social History Main Topics     Smoking status: Former Smoker     Packs/day: 2.00     Years: 55.00     Quit date: 1/22/1998     Smokeless tobacco: Never Used     Alcohol use 0.0 oz/week     0 Standard drinks or equivalent per week      Comment: occassionaly     Drug use: No     Sexual activity: Not Currently     Partners: Female     Other Topics Concern     Caffeine Concern No     occ     Special Diet No     Exercise No     Seat Belt Yes     Social History Narrative    , [place]    Retired  - self employed       Review of Systems:  Skin:  Negative       Eyes:  Positive for glasses    ENT:  Negative      Respiratory:  Negative dyspnea on exertion    "  Cardiovascular:  Negative for;palpitations;chest pain Positive for;edema better  Gastroenterology: Negative nausea;vomiting;poor appetite    Genitourinary:  not assessed      Musculoskeletal:  Positive for arthritis;joint stiffness;joint pain    Neurologic:  Negative numbness or tingling of feet    Psychiatric:  Negative      Heme/Lymph/Imm:  Negative allergies    Endocrine:  Negative        Physical Exam:  Vitals: /70  Pulse 71  Ht 1.778 m (5' 10\")  Wt 82.6 kg (182 lb)  BMI 26.11 kg/m2    Constitutional:           Skin:             Head:           Eyes:           Lymph:      ENT:           Neck:           Respiratory:            Cardiac:                                                           GI:           Extremities and Muscular Skeletal:                 Neurological:           Psych:             CC  Mario Kelly MD  [address]  [place] [address]                  "

## 2018-02-07 NOTE — MR AVS SNAPSHOT
"              After Visit Summary   2/7/2018    Hermilo Velasquez    MRN: [id number]           Patient Information     Date Of Birth          11/3/1932        Visit Information        Provider Department      2/7/2018 10:45 AM Tab Grijalva MD [facility]        Today's Diagnoses     S/P AVR        Paroxysmal atrial fibrillation (H)           Follow-ups after your visit        Additional Services     Follow-Up with Cardiologist                 Your next 10 appointments already scheduled     Feb 21, 2018 11:15 AM CST   Anticoagulation Visit with  ANTICOAGULATION CLINIC   [facility]    [address]  [facility] [id number] [phone]              Future tests that were ordered for you today     Open Future Orders        Priority Expected Expires Ordered    Follow-Up with Cardiologist Routine 2/7/2019 2/8/2019 2/7/2018            Who to contact     If you have questions or need follow up information about today's clinic visit or your schedule please contact [facility] directly at [phone].  Normal or non-critical lab and imaging results will be communicated to you by [employer], letter or phone within 4 business days after the clinic has received the results. If you do not hear from us within 7 days, please contact the clinic through [employer] or phone. If you have a critical or abnormal lab result, we will notify you by phone as soon as possible.  Submit refill requests through [employer] or call your pharmacy and they will forward the refill request to us. Please allow 3 business days for your refill to be completed.          Additional Information About Your Visit        MyChart Information     [employer] lets you send messages to your doctor, view your test results, renew your prescriptions, schedule appointments and more. To sign up, go to [email or web address] . Click on \"Log in\" on the left side " "of the screen, which will take you to the Welcome page. Then click on \"Sign up Now\" on the right side of the page.     You will be asked to enter the access code listed below, as well as some personal information. Please follow the directions to create your username and password.     Your access code is: [email or web address]  Expires: 4/10/2018 12:04 PM     Your access code will  in 90 days. If you need help or a new code, please call your [facility] clinic or [phone].        Care EveryWhere ID     This is your Care EveryWhere ID. This could be used by other organizations to access your [facility] medical records  [id number]        Your Vitals Were     Pulse Height BMI (Body Mass Index)             71 1.778 m (5' 10\") 26.11 kg/m2          Blood Pressure from Last 3 Encounters:   18 131/70   01/10/18 129/68   17 113/64    Weight from Last 3 Encounters:   18 82.6 kg (182 lb)   01/10/18 84.4 kg (186 lb)   17 80.5 kg (177 lb 8 oz)              We Performed the Following     Follow-Up with Cardiologist        Primary Care Provider Office Phone # Fax #    Karson Bishop [phone]       [facility] [address]  [facility] [address]        Equal Access to Services     [facility]: Hadii aad ku hadasho Soomaali, waaxda luqadaha, qaybta kaalmada adeegyada, jf salazar . So [facility] [phone].    ATENCIÓN: Si habla español, tiene a randle disposición servicios gratuitos de asistencia lingüística. Llame al [phone].    We comply with applicable federal civil rights laws and [place] laws. We do not discriminate on the basis of race, color, national origin, age, disability, sex, sexual orientation, or gender identity.            Thank you!     Thank you for choosing [facility]  for your care. Our goal is always to provide you with excellent care. Hearing back from our patients is one way we can " continue to improve our services. Please take a few minutes to complete the written survey that you may receive in the mail after your visit with us. Thank you!             Your Updated Medication List - Protect others around you: Learn how to safely use, store and throw away your medicines at www.disposemymeds.org.          This list is accurate as of 2/7/18 11:13 AM.  Always use your most recent med list.                   Brand Name Dispense Instructions for use Diagnosis    hydrOXYzine 25 MG tablet    ATARAX    120 tablet    Take 1 to 2 tablets (25-50 mg) by mouth every 6 hours as needed for itching    Chronic pruritus       metoprolol succinate 100 MG 24 hr tablet    TOPROL-XL    90 tablet    Take 1 tablet (100 mg) by mouth daily    Benign essential hypertension       omeprazole 20 MG CR capsule    priLOSEC    180 capsule    TAKE TWO CAPSULES BY MOUTH DAILY    Gastrointestinal hemorrhage with melena       warfarin 5 MG tablet    COUMADIN    140 tablet    TAKE 1 TO 1.5 TABLETS (5 TO 7.5 MG) BY MOUTH DAILY. TAKE AS PRESCRIBED BY INR CLINIC. CURRENT DOSE IS 7.5 MG FOR 2 DAYS AND 5 MG FOR 5 DAYS.    Paroxysmal atrial fibrillation (H)

## 2018-02-07 NOTE — PROGRESS NOTES
Service Date: 02/07/2018      REASON FOR CLINIC VISIT:  Followup AVR.      HISTORY OF PRESENT ILLNESS:  Mr. Velasquez is a very pleasant 85-year-old gentleman with history of aortic valve stenosis, status post aortic valve replacement with a bioprosthetic valve in 2015 with pre-AVR coronary angiogram showing minimal coronary artery disease with paroxysmal atrial fibrillation, now on Coumadin with history of GI bleed in the past, but has been tolerating Coumadin quite well with no recurrence of GI bleed.  He also has history of bilateral PE, DVT.  At the time of PE, his RV function was reduced and subsequent echocardiogram showed RV function normalized.  The last echocardiogram in 07/2016 showed LVEF of 50-55% with mildly dilated RV, normal RV systolic function with normal gradients across the bioprosthetic aortic valve and no aortic regurgitation.  To be noted, the patient sees Dr. Kelly in our Cardiology Clinic.  I have seen him once in 07/2016 for a perioperative cardiovascular risk assessment for knee surgery.  Today, he is coming to see me for routine followup.  The patient tells me health wise he is feeling well.  He is recovering from an URI.  He denies any chest discomfort or shortness of breath at rest or with physical activity.  He is retired but he is fairly active at home.  He climbs stairs, about 20 steps several times a day with no symptoms.  He is on Toprol- mg daily.  I think the dose was recently decreased by his primary care physician due to dizziness.  He no longer feels dizzy.  His blood pressure is well controlled.  He is on Coumadin, which he is tolerating quite well.  Rhythm appears regular today on exam.  He is aware of predental workup antibiotic prophylaxis.  He recently had an LEANDRA which was normal.      PHYSICAL EXAMINATION:   VITAL SIGNS:  Blood pressure 131/70, heart rate 71 and regular, weight 182 pounds, BMI 26.17.   GENERAL:  The patient appears pleasant, comfortable.   NECK:   Normal JVP, no bruit.   CARDIOVASCULAR SYSTEM:  S1, S2 normal, no murmur, rub or gallop.   RESPIRATORY SYSTEM:  Clear to auscultation bilaterally.     ABDOMEN:  Soft, nontender.   EXTREMITIES:  No pitting pedal edema.   NEUROLOGICAL:  Alert and oriented x3.   PSYCHIATRIC:  Normal affect.   SKIN:  No obvious rash.     HEENT:  No pallor or icterus.      IMPRESSION AND PLAN:  A pleasant 85-year-old gentleman with history of bioprosthetic aortic valve replacement in  with pre-AVR coronary angiogram showing minimal coronary artery disease.  In fact, angiogram films were today personally reviewed by me as I was unable to find an official cath report.  Cardiac status-wise, he is doing quite well without any symptoms of angina or congestive heart failure.  Cardiac auscultation was benign.  He is tolerating Coumadin quite well.  He has a CHADS2-VASc score of 3 (age, hypertension).  He is aware of predental workup antibiotic prophylaxis.  If he continues to feel well, cardiac status-wise, he can be seen in the Cardiology Clinic in 1 year, sooner if he notices any change in clinical status, especially if any exertional related symptoms.         SANIYA ZEPEDA MD             D: 2018   T: 2018   MT: monty      Name:     CLYDE RODRIGUEZ   MRN:      [id number]        Account:      [id number]   :      1932           Service Date: 2018      Document: [id number]

## 2018-02-07 NOTE — LETTER
"2/7/2018    Karson Bishop MD  [facility] [address]  [place] [address]    RE: Hermilo Velasquez       Dear Colleague,    I had the pleasure of seeing Hermilo Velasquez in the [facility].    HPI and Plan:   See dictation(#[id number])    Orders Placed This Encounter   Procedures     Follow-Up with Cardiologist       No orders of the defined types were placed in this encounter.      There are no discontinued medications.      Encounter Diagnoses   Name Primary?     S/P AVR      Paroxysmal atrial fibrillation (H)        CURRENT MEDICATIONS:  Current Outpatient Prescriptions   Medication Sig Dispense Refill     hydrOXYzine (ATARAX) 25 MG tablet Take 1 to 2 tablets (25-50 mg) by mouth every 6 hours as needed for itching 120 tablet 2     metoprolol (TOPROL-XL) 100 MG 24 hr tablet Take 1 tablet (100 mg) by mouth daily 90 tablet 3     warfarin (COUMADIN) 5 MG tablet TAKE 1 TO 1.5 TABLETS (5 TO 7.5 MG) BY MOUTH DAILY. TAKE AS PRESCRIBED BY INR CLINIC. CURRENT DOSE IS 7.5 MG FOR 2 DAYS AND 5 MG FOR 5 DAYS. 140 tablet 0     omeprazole (PRILOSEC) 20 MG CR capsule TAKE TWO CAPSULES BY MOUTH DAILY  180 capsule 2       ALLERGIES     Allergies   Allergen Reactions     Lidocaine Blisters     Allergy to lidocaine ointment     Lasix [Furosemide] Rash     Penicillins Rash     \"broke out from injection\" 60 yrs ago         PAST MEDICAL HISTORY:  Past Medical History:   Diagnosis Date     Aortic stenosis     Severe AS, 9/2015 AVR - [facility] TRIFECTA Bovine bioprosthesis 25MM TF-25A     Atrial fibrillation (H)     9/2015 Paroxysmal post op Afib - discharged on Warfarin and a beta blocker     Deep vein thrombosis (H)      GERD (gastroesophageal reflux disease)      Heart murmur      Monoclonal gammopathy     plasmacyte prominent causing monoclonal gammopathy     Need for SBE (subacute bacterial endocarditis) prophylaxis      Neuropathy      Other and unspecified hyperlipidemia      " Other malignant lymphomas     non hodgkin's lymphoma     RBBB (right bundle branch block)      Severe sepsis with acute organ dysfunction (H) 11/16/2015     Unspecified hereditary and idiopathic peripheral neuropathy        PAST SURGICAL HISTORY:  Past Surgical History:   Procedure Laterality Date     APPENDECTOMY       AS TOTAL KNEE ARTHROPLASTY       ESOPHAGOSCOPY, GASTROSCOPY, DUODENOSCOPY (EGD), COMBINED N/A 11/28/2015    Procedure: COMBINED ESOPHAGOSCOPY, GASTROSCOPY, DUODENOSCOPY (EGD);  Surgeon: Danis Castillo MD;  Location:  GI     HERNIA REPAIR  2006     HERNIORRHAPHY VENTRAL  4/17/2013    Procedure: HERNIORRHAPHY VENTRAL;  VENTRAL HERNIA REPAIR WITH MESH;  Surgeon: Patel Guzman MD;  Location:  OR     KNEE SURGERY      arthroscopic right knee surgery      REPAIR LIGAMENT ANKLE  2/23/2012    Procedure:REPAIR LIGAMENT ANKLE; LEFT TARSAL TUNNEL RELEASE OF KNOT OF ARIEL RELEASE; Surgeon:SAUL PUENTE; Location: OR     REPLACE VALVE AORTIC N/A 9/3/2015    Procedure: REPLACE VALVE AORTIC;  Surgeon: Antonino Mitchell MD;  Location:  OR     ROTATOR CUFF REPAIR RT/LT      bilateral     SPINE SURGERY      3 spine surgeries     TONSILLECTOMY       TURP         FAMILY HISTORY:  Family History   Problem Relation Age of Onset     C.A.D. Father      Emphysema Father        SOCIAL HISTORY:  Social History     Social History     Marital status:      Spouse name: N/A     Number of children: N/A     Years of education: N/A     Social History Main Topics     Smoking status: Former Smoker     Packs/day: 2.00     Years: 55.00     Quit date: 1/22/1998     Smokeless tobacco: Never Used     Alcohol use 0.0 oz/week     0 Standard drinks or equivalent per week      Comment: occassionaly     Drug use: No     Sexual activity: Not Currently     Partners: Female     Other Topics Concern     Caffeine Concern No     occ     Special Diet No     Exercise No     Seat Belt Yes     Social History  "Narrative    , 2 adult [place]    Retired  - self employed       Review of Systems:  Skin:  Negative       Eyes:  Positive for glasses    ENT:  Negative      Respiratory:  Negative dyspnea on exertion     Cardiovascular:  Negative for;palpitations;chest pain Positive for;edema better  Gastroenterology: Negative nausea;vomiting;poor appetite    Genitourinary:  not assessed      Musculoskeletal:  Positive for arthritis;joint stiffness;joint pain    Neurologic:  Negative numbness or tingling of feet    Psychiatric:  Negative      Heme/Lymph/Imm:  Negative allergies    Endocrine:  Negative        Physical Exam:  Vitals: /70  Pulse 71  Ht 1.778 m (5' 10\")  Wt 82.6 kg (182 lb)  BMI 26.11 kg/m2    Constitutional:           Skin:             Head:           Eyes:           Lymph:      ENT:           Neck:           Respiratory:            Cardiac:                                                           GI:           Extremities and Muscular Skeletal:                 Neurological:           Psych:             CC  Mario Kelly MD  [address]  [place] [address]                    Thank you for allowing me to participate in the care of your patient.      Sincerely,     Tab Grijalva MD     [facility]    cc:   Mario Kelly MD  [address]  [place] [address]        "

## 2018-02-09 ENCOUNTER — TELEPHONE (OUTPATIENT)
Dept: CARDIOLOGY | Facility: CLINIC | Age: 83
End: 2018-02-09

## 2018-02-09 DIAGNOSIS — E78.2 MIXED HYPERLIPIDEMIA: Primary | ICD-10-CM

## 2018-02-09 NOTE — TELEPHONE ENCOUNTER
Call from [facility] pharmacy [phone] asking about refill for lipitor 10mg daily. Reviewed with pharmacy that lipitor was stopped by PCP Dr. Bishop for a rash concern on 1/10/18.      Message to Dr. Grijalva saw patient  on 2-7-2018  Dr. Bishop held statin due to a rash and found out it was omeprazole do you want to restart lipitor 10mg?

## 2018-02-12 ENCOUNTER — OFFICE VISIT (OUTPATIENT)
Dept: FAMILY MEDICINE | Facility: CLINIC | Age: 83
End: 2018-02-12
Payer: COMMERCIAL

## 2018-02-12 ENCOUNTER — RADIANT APPOINTMENT (OUTPATIENT)
Dept: GENERAL RADIOLOGY | Facility: CLINIC | Age: 83
End: 2018-02-12
Attending: INTERNAL MEDICINE
Payer: COMMERCIAL

## 2018-02-12 VITALS
WEIGHT: 188 LBS | BODY MASS INDEX: 26.92 KG/M2 | TEMPERATURE: 97.8 F | HEART RATE: 71 BPM | OXYGEN SATURATION: 95 % | HEIGHT: 70 IN | DIASTOLIC BLOOD PRESSURE: 60 MMHG | SYSTOLIC BLOOD PRESSURE: 116 MMHG

## 2018-02-12 DIAGNOSIS — J06.9 UPPER RESPIRATORY TRACT INFECTION, UNSPECIFIED TYPE: Primary | ICD-10-CM

## 2018-02-12 DIAGNOSIS — J06.9 UPPER RESPIRATORY TRACT INFECTION, UNSPECIFIED TYPE: ICD-10-CM

## 2018-02-12 DIAGNOSIS — Z79.01 LONG-TERM (CURRENT) USE OF ANTICOAGULANTS: ICD-10-CM

## 2018-02-12 LAB
ERYTHROCYTE [DISTWIDTH] IN BLOOD BY AUTOMATED COUNT: 15.2 % (ref 10–15)
HCT VFR BLD AUTO: 36.5 % (ref 40–53)
HGB BLD-MCNC: 11.1 G/DL (ref 13.3–17.7)
MCH RBC QN AUTO: 27.1 PG (ref 26.5–33)
MCHC RBC AUTO-ENTMCNC: 30.4 G/DL (ref 31.5–36.5)
MCV RBC AUTO: 89 FL (ref 78–100)
PLATELET # BLD AUTO: 246 10E9/L (ref 150–450)
RBC # BLD AUTO: 4.1 10E12/L (ref 4.4–5.9)
WBC # BLD AUTO: 12.5 10E9/L (ref 4–11)

## 2018-02-12 PROCEDURE — 71046 X-RAY EXAM CHEST 2 VIEWS: CPT

## 2018-02-12 PROCEDURE — 85027 COMPLETE CBC AUTOMATED: CPT | Performed by: INTERNAL MEDICINE

## 2018-02-12 PROCEDURE — 80048 BASIC METABOLIC PNL TOTAL CA: CPT | Performed by: INTERNAL MEDICINE

## 2018-02-12 PROCEDURE — 36415 COLL VENOUS BLD VENIPUNCTURE: CPT | Performed by: INTERNAL MEDICINE

## 2018-02-12 PROCEDURE — 99214 OFFICE O/P EST MOD 30 MIN: CPT | Performed by: INTERNAL MEDICINE

## 2018-02-12 RX ORDER — ALBUTEROL SULFATE 90 UG/1
1-2 AEROSOL, METERED RESPIRATORY (INHALATION) EVERY 6 HOURS PRN
Qty: 1 INHALER | Refills: 3 | Status: SHIPPED | OUTPATIENT
Start: 2018-02-12 | End: 2018-03-26

## 2018-02-12 RX ORDER — AZITHROMYCIN 250 MG/1
TABLET, FILM COATED ORAL
Qty: 6 TABLET | Refills: 0 | Status: SHIPPED | OUTPATIENT
Start: 2018-02-12 | End: 2018-03-19

## 2018-02-12 NOTE — PATIENT INSTRUCTIONS
(J06.9) Upper respiratory tract infection, unspecified type  (primary encounter diagnosis)  Comment: we will check chest x-ray and labs.  I would recommend use of azithromycin antibiotic to treat empirically and also use of QVar 2 puffs twice per day scheduled for the next two weeks.  Continue albuterol as needed.  Follow up in INR clinic on Thursday  Plan: albuterol (PROAIR HFA/PROVENTIL HFA/VENTOLIN         HFA) 108 (90 BASE) MCG/ACT Inhaler,         beclomethasone (QVAR) 40 MCG/ACT Inhaler,         azithromycin (ZITHROMAX) 250 MG tablet, XR         Chest 2 Views, CBC with platelets, Basic         metabolic panel            (Z79.01) Long-term (current) use of anticoagulants [Z79.01]  Comment: Follow up in INR clinic Thursday   Plan:

## 2018-02-12 NOTE — MR AVS SNAPSHOT
After Visit Summary   2/12/2018    Hermilo Velasquez    MRN: [id number]           Patient Information     Date Of Birth          11/3/1932        Visit Information        Provider Department      2/12/2018 1:30 PM Bucky Boone MD [facility]        Today's Diagnoses     Upper respiratory tract infection, unspecified type    -  1    Long-term (current) use of anticoagulants [Z79.01]          Care Instructions    (J06.9) Upper respiratory tract infection, unspecified type  (primary encounter diagnosis)  Comment: we will check chest x-ray and labs.  I would recommend use of azithromycin antibiotic to treat empirically and also use of QVar 2 puffs twice per day scheduled for the next two weeks.  Continue albuterol as needed.  Follow up in INR clinic on Thursday  Plan: albuterol (PROAIR HFA/PROVENTIL HFA/VENTOLIN         HFA) 108 (90 BASE) MCG/ACT Inhaler,         beclomethasone (QVAR) 40 MCG/ACT Inhaler,         azithromycin (ZITHROMAX) 250 MG tablet, XR         Chest 2 Views, CBC with platelets, Basic         metabolic panel            (Z79.01) Long-term (current) use of anticoagulants [Z79.01]  Comment: Follow up in INR clinic Thursday   Plan:              Follow-ups after your visit        Your next 10 appointments already scheduled     Feb 21, 2018 11:15 AM CST   Anticoagulation Visit with  ANTICOAGULATION CLINIC   [facility] ([facility])    [address]  [place] [id number] [phone]              Future tests that were ordered for you today     Open Future Orders        Priority Expected Expires Ordered    XR Chest 2 Views Routine 2/12/2018 2/12/2019 2/12/2018            Who to contact     If you have questions or need follow up information about today's clinic visit or your schedule please contact [facility] directly at [phone].  Normal or non-critical lab and imaging results will be communicated to you by MyChart, letter or  "phone within 4 business days after the clinic has received the results. If you do not hear from us within 7 days, please contact the clinic through [employer] or phone. If you have a critical or abnormal lab result, we will notify you by phone as soon as possible.  Submit refill requests through [employer] or call your pharmacy and they will forward the refill request to us. Please allow 3 business days for your refill to be completed.          Additional Information About Your Visit        [employer] Information     [employer] lets you send messages to your doctor, view your test results, renew your prescriptions, schedule appointments and more. To sign up, go to [email or web address] . Click on \"Log in\" on the left side of the screen, which will take you to the Welcome page. Then click on \"Sign up Now\" on the right side of the page.     You will be asked to enter the access code listed below, as well as some personal information. Please follow the directions to create your username and password.     Your access code is: [email or web address]  Expires: 4/10/2018 12:04 PM     Your access code will  in 90 days. If you need help or a new code, please call your [place] clinic or [phone].        Care EveryWhere ID     This is your Care EveryWhere ID. This could be used by other organizations to access your [place] medical records  [id number]        Your Vitals Were     Pulse Temperature Height Pulse Oximetry BMI (Body Mass Index)       71 97.8  F (36.6  C) 5' 10\" (1.778 m) 95% 26.98 kg/m2        Blood Pressure from Last 3 Encounters:   18 116/60   18 131/70   01/10/18 129/68    Weight from Last 3 Encounters:   18 188 lb (85.3 kg)   18 182 lb (82.6 kg)   01/10/18 186 lb (84.4 kg)              We Performed the Following     Basic metabolic panel     CBC with platelets          Today's Medication Changes          These changes are accurate as of 18  2:02 PM.  If you have any questions, ask your " nurse or doctor.               Start taking these medicines.        Dose/Directions    albuterol 108 (90 BASE) MCG/ACT Inhaler   Commonly known as:  PROAIR HFA/PROVENTIL HFA/VENTOLIN HFA   Used for:  Upper respiratory tract infection, unspecified type   Started by:  Bucky Boone MD        Dose:  1-2 puff   Inhale 1-2 puffs into the lungs every 6 hours as needed for shortness of breath / dyspnea or wheezing   Quantity:  1 Inhaler   Refills:  3       azithromycin 250 MG tablet   Commonly known as:  ZITHROMAX   Used for:  Upper respiratory tract infection, unspecified type   Started by:  Bucky Boone MD        Two tablets first day, then one tablet daily for four days.   Quantity:  6 tablet   Refills:  0       beclomethasone 40 MCG/ACT Inhaler   Commonly known as:  QVAR   Used for:  Upper respiratory tract infection, unspecified type   Started by:  Bucky Boone MD        Dose:  2 puff   Inhale 2 puffs into the lungs 2 times daily   Quantity:  1 Inhaler   Refills:  3            Where to get your medicines      These medications were sent to [id number] - [facility] [address]  [address] [facility] [address]     Phone:  [phone]     albuterol 108 (90 BASE) MCG/ACT Inhaler    azithromycin 250 MG tablet    beclomethasone 40 MCG/ACT Inhaler                Primary Care Provider Office Phone # Fax #    Karson Bishop [phone]       [address] [address]        Equal Access to Services     [facility]: Hadii aad ku hadasho Soomaali, waaxda luqadaha, qaybta kaalmada adeegyada, waxay demetrisin mignon salazar . So [facility] [phone].    ATENCIÓN: Si habla español, tiene a randle disposición servicios gratuitos de asistencia lingüística. Llame al [phone].    We comply with applicable federal civil rights laws and [place] laws. We do not discriminate on the basis of race,  color, national origin, age, disability, sex, sexual orientation, or gender identity.            Thank you!     Thank you for choosing [facility]  for your care. Our goal is always to provide you with excellent care. Hearing back from our patients is one way we can continue to improve our services. Please take a few minutes to complete the written survey that you may receive in the mail after your visit with us. Thank you!             Your Updated Medication List - Protect others around you: Learn how to safely use, store and throw away your medicines at www.disposemymeds.org.          This list is accurate as of 2/12/18  2:02 PM.  Always use your most recent med list.                   Brand Name Dispense Instructions for use Diagnosis    albuterol 108 (90 BASE) MCG/ACT Inhaler    PROAIR HFA/PROVENTIL HFA/VENTOLIN HFA    1 Inhaler    Inhale 1-2 puffs into the lungs every 6 hours as needed for shortness of breath / dyspnea or wheezing    Upper respiratory tract infection, unspecified type       azithromycin 250 MG tablet    ZITHROMAX    6 tablet    Two tablets first day, then one tablet daily for four days.    Upper respiratory tract infection, unspecified type       beclomethasone 40 MCG/ACT Inhaler    QVAR    1 Inhaler    Inhale 2 puffs into the lungs 2 times daily    Upper respiratory tract infection, unspecified type       hydrOXYzine 25 MG tablet    ATARAX    120 tablet    Take 1 to 2 tablets (25-50 mg) by mouth every 6 hours as needed for itching    Chronic pruritus       metoprolol succinate 100 MG 24 hr tablet    TOPROL-XL    90 tablet    Take 1 tablet (100 mg) by mouth daily    Benign essential hypertension       omeprazole 20 MG CR capsule    priLOSEC    180 capsule    TAKE TWO CAPSULES BY MOUTH DAILY    Gastrointestinal hemorrhage with melena       warfarin 5 MG tablet    COUMADIN    140 tablet    TAKE 1 TO 1.5 TABLETS (5 TO 7.5 MG) BY MOUTH DAILY. TAKE AS PRESCRIBED BY INR CLINIC. CURRENT  DOSE IS 7.5 MG FOR 2 DAYS AND 5 MG FOR 5 DAYS.    Paroxysmal atrial fibrillation (H)

## 2018-02-12 NOTE — NURSING NOTE
"Chief Complaint   Patient presents with     Cough       Initial /60 (BP Location: Left arm, Patient Position: Chair, Cuff Size: Adult Regular)  Pulse 71  Temp 97.8  F (36.6  C)  Ht 5' 10\" (1.778 m)  Wt 188 lb (85.3 kg)  SpO2 95%  BMI 26.98 kg/m2 Estimated body mass index is 26.98 kg/(m^2) as calculated from the following:    Height as of this encounter: 5' 10\" (1.778 m).    Weight as of this encounter: 188 lb (85.3 kg).  Medication Reconciliation: complete   Mesha Hallman MA     "

## 2018-02-12 NOTE — PROGRESS NOTES
"  SUBJECTIVE:   Hermilo Velasquez is a 85 year old male who presents to clinic today for the following health issues:      Acute Illness   Acute illness concerns: Cough   Onset: x 2 weeks     Fever: no    Chills/Sweats: YES- Chills     Headache (location?): no    Sinus Pressure:YES    Conjunctivitis:  YES: left    Ear Pain: no    Rhinorrhea: no    Congestion: YES    Sore Throat: no     Cough: YES    Wheeze: YES    Decreased Appetite: no    Nausea: no    Vomiting: no    Diarrhea:  no    Dysuria/Freq.: no    Fatigue/Achiness: no    Sick/Strep Exposure: no     Therapies Tried and outcome: Mucinex - shows mild improvement     [facility]  CLINIC PROGRESS NOTE    Subjective:  Cough   Hermilo Velasquez presents today for symptoms of cough with large mucus production.   Occasional shortness of breath.   He is using an albuterol inhaler which has helped a little bit.  He uses it twice per day.  He has not taken any antibiotics recently.   No fevers.        Past medical history, medications, allergies, social history, family history reviewed and updated in [facility] as of 2/12/2018 .    ROS  CONSTITUTIONAL: no fatigue, no unexpected change in weight  SKIN: no worrisome rashes, no worrisome moles, no worrisome lesions  EYES: no acute vision problems or changes  ENT: no ear problems, no mouth problems, no throat problems  RESP: as above   CV: no chest pain, no palpitations, no new or worsening peripheral edema  GI: no nausea, no vomiting, no constipation, no diarrhea  : no frequency, no dysuria, no hematuria  MS: stable back pain   PSYCHIATRIC: no changes in mood or affect      Objective:  Vitals  /60 (BP Location: Left arm, Patient Position: Chair, Cuff Size: Adult Regular)  Pulse 71  Temp 97.8  F (36.6  C)  Ht 5' 10\" (1.778 m)  Wt 188 lb (85.3 kg)  SpO2 95%  BMI 26.98 kg/m2  GEN: Alert Oriented x3 NAD  HEENT: Atraumatic, normocephalic, neck supple,  TM: TM bilaterally pearly and grey with normal light " reflex  CV: RRR no murmurs or rubs  PULM: Course breath sounds + wheezing  ABD: Soft, nontender nondistended, no hepatosplenomegally  SKIN: No visible skin lesion or ulcerations  EXT: 2+ dorsal pedis pulses, no edema bilateral lower extremities  NEURO: Gait and station deferred, No focal neurologic deficits  PSYCH: Mood good, affect mood congruent    No images are attached to the encounter.    No results found for this or any previous visit (from the past 24 hour(s)).    Assessment/Plan:  Patient Instructions   (J06.9) Upper respiratory tract infection, unspecified type  (primary encounter diagnosis)  Comment: we will check chest x-ray and labs.  I would recommend use of azithromycin antibiotic to treat empirically and also use of QVar 2 puffs twice per day scheduled for the next two weeks.  Continue albuterol as needed.  Follow up in INR clinic on Thursday  Plan: albuterol (PROAIR HFA/PROVENTIL HFA/VENTOLIN         HFA) 108 (90 BASE) MCG/ACT Inhaler,         beclomethasone (QVAR) 40 MCG/ACT Inhaler,         azithromycin (ZITHROMAX) 250 MG tablet, XR         Chest 2 Views, CBC with platelets, Basic         metabolic panel            (Z79.01) Long-term (current) use of anticoagulants [Z79.01]  Comment: Follow up in INR clinic Thursday   Plan:        Follow up as needed     Disclaimer: This note consists of symbols derived from keyboarding, dictation and/or voice recognition software. As a result, there may be errors in the script that have gone undetected. Please consider this when interpreting information found in this chart.    Bucky Boone MD  [phone]

## 2018-02-12 NOTE — LETTER
[facility]  [address]  [address]  [place]  [address]  Tel: [phone]    February 13, 2018    Hermilo MADRID Leakevin  [address] [facility] [address]        Dear Brennan Eva,    I have had the opportunity to review your recent labs and they are as follows:    Your labs do show evidence of elevated white blood cell count consistent with acute respiratory infection     Your basic metabolic panel show stable kidney function.  If you have any further questions or problems, please contact our office.      Sincerely,    Bucky Boone MD / elizabeth          Enclosure: Lab Results    Resulted Orders   CBC with platelets   Result Value Ref Range    WBC 12.5 (H) 4.0 - 11.0 10e9/L      Comment:      NEUTROPHILIA SEEN ,VSR    RBC Count 4.10 (L) 4.4 - 5.9 10e12/L    Hemoglobin 11.1 (L) 13.3 - 17.7 g/dL    Hematocrit 36.5 (L) 40.0 - 53.0 %    MCV 89 78 - 100 fl    MCH 27.1 26.5 - 33.0 pg    MCHC 30.4 (L) 31.5 - 36.5 g/dL    RDW 15.2 (H) 10.0 - 15.0 %    Platelet Count 246 150 - 450 10e9/L   Basic metabolic panel   Result Value Ref Range    Sodium 142 133 - 144 mmol/L    Potassium 4.2 3.4 - 5.3 mmol/L    Chloride 106 94 - 109 mmol/L    Carbon Dioxide 32 20 - 32 mmol/L    Anion Gap 4 3 - 14 mmol/L    Glucose 142 (H) 70 - 99 mg/dL    Urea Nitrogen 23 7 - 30 mg/dL    Creatinine 0.93 0.66 - 1.25 mg/dL    GFR Estimate 77 >60 mL/min/1.7m2      Comment:      Non  GFR Calc    GFR Estimate If Black >90 >60 mL/min/1.7m2      Comment:       GFR Calc    Calcium 8.5 8.5 - 10.1 mg/dL

## 2018-02-13 LAB
ANION GAP SERPL CALCULATED.3IONS-SCNC: 4 MMOL/L (ref 3–14)
BUN SERPL-MCNC: 23 MG/DL (ref 7–30)
CALCIUM SERPL-MCNC: 8.5 MG/DL (ref 8.5–10.1)
CHLORIDE SERPL-SCNC: 106 MMOL/L (ref 94–109)
CO2 SERPL-SCNC: 32 MMOL/L (ref 20–32)
CREAT SERPL-MCNC: 0.93 MG/DL (ref 0.66–1.25)
GFR SERPL CREATININE-BSD FRML MDRD: 77 ML/MIN/1.7M2
GLUCOSE SERPL-MCNC: 142 MG/DL (ref 70–99)
POTASSIUM SERPL-SCNC: 4.2 MMOL/L (ref 3.4–5.3)
SODIUM SERPL-SCNC: 142 MMOL/L (ref 133–144)

## 2018-02-13 RX ORDER — ATORVASTATIN CALCIUM 10 MG/1
10 TABLET, FILM COATED ORAL DAILY
Qty: 90 TABLET | Refills: 3 | Status: SHIPPED | OUTPATIENT
Start: 2018-02-13 | End: 2019-02-15

## 2018-02-13 NOTE — TELEPHONE ENCOUNTER
New script placed for Lipitor 10 mg to take daily at bedtime per Dr. Grijalva.     Patient called and updated. Stated he is seeing Dr. Bishop on Thursday 2/15/18 and felt he would review this matter with Dr. Bishop.

## 2018-02-15 ENCOUNTER — TELEPHONE (OUTPATIENT)
Dept: FAMILY MEDICINE | Facility: CLINIC | Age: 83
End: 2018-02-15

## 2018-02-15 ENCOUNTER — ANTICOAGULATION THERAPY VISIT (OUTPATIENT)
Dept: NURSING | Facility: CLINIC | Age: 83
End: 2018-02-15
Payer: COMMERCIAL

## 2018-02-15 ENCOUNTER — OFFICE VISIT (OUTPATIENT)
Dept: FAMILY MEDICINE | Facility: CLINIC | Age: 83
End: 2018-02-15
Payer: COMMERCIAL

## 2018-02-15 VITALS
DIASTOLIC BLOOD PRESSURE: 62 MMHG | SYSTOLIC BLOOD PRESSURE: 104 MMHG | OXYGEN SATURATION: 92 % | WEIGHT: 188 LBS | BODY MASS INDEX: 26.92 KG/M2 | TEMPERATURE: 97.1 F | HEART RATE: 87 BPM | HEIGHT: 70 IN

## 2018-02-15 DIAGNOSIS — Z79.01 LONG-TERM (CURRENT) USE OF ANTICOAGULANTS: ICD-10-CM

## 2018-02-15 DIAGNOSIS — D72.829 LEUKOCYTOSIS, UNSPECIFIED TYPE: ICD-10-CM

## 2018-02-15 DIAGNOSIS — I26.99 PULMONARY EMBOLISM, BILATERAL (H): ICD-10-CM

## 2018-02-15 DIAGNOSIS — C85.90 NON-HODGKIN'S LYMPHOMA, UNSPECIFIED BODY REGION, UNSPECIFIED NON-HODGKIN LYMPHOMA TYPE (H): ICD-10-CM

## 2018-02-15 DIAGNOSIS — I48.0 PAROXYSMAL ATRIAL FIBRILLATION (H): ICD-10-CM

## 2018-02-15 DIAGNOSIS — J18.9 PNEUMONIA DUE TO INFECTIOUS ORGANISM, UNSPECIFIED LATERALITY, UNSPECIFIED PART OF LUNG: Primary | ICD-10-CM

## 2018-02-15 LAB
ERYTHROCYTE [DISTWIDTH] IN BLOOD BY AUTOMATED COUNT: 15.5 % (ref 10–15)
HCT VFR BLD AUTO: 33.6 % (ref 40–53)
HGB BLD-MCNC: 10.2 G/DL (ref 13.3–17.7)
INR POINT OF CARE: 2.9 (ref 0.86–1.14)
MCH RBC QN AUTO: 27.4 PG (ref 26.5–33)
MCHC RBC AUTO-ENTMCNC: 30.4 G/DL (ref 31.5–36.5)
MCV RBC AUTO: 90 FL (ref 78–100)
PLATELET # BLD AUTO: 209 10E9/L (ref 150–450)
RBC # BLD AUTO: 3.72 10E12/L (ref 4.4–5.9)
WBC # BLD AUTO: 10.5 10E9/L (ref 4–11)

## 2018-02-15 PROCEDURE — 99207 ZZC NO CHARGE NURSE ONLY: CPT

## 2018-02-15 PROCEDURE — 36416 COLLJ CAPILLARY BLOOD SPEC: CPT

## 2018-02-15 PROCEDURE — 85610 PROTHROMBIN TIME: CPT | Mod: QW

## 2018-02-15 PROCEDURE — 85027 COMPLETE CBC AUTOMATED: CPT | Performed by: INTERNAL MEDICINE

## 2018-02-15 PROCEDURE — 99214 OFFICE O/P EST MOD 30 MIN: CPT | Performed by: INTERNAL MEDICINE

## 2018-02-15 RX ORDER — METHYLPREDNISOLONE 4 MG
TABLET, DOSE PACK ORAL
Qty: 21 TABLET | Refills: 0 | Status: SHIPPED | OUTPATIENT
Start: 2018-02-15 | End: 2018-03-19

## 2018-02-15 NOTE — MR AVS SNAPSHOT
After Visit Summary   2/15/2018    Hermilo Velasquez    MRN: [id number]           Patient Information     Date Of Birth          11/3/1932        Visit Information        Provider Department      2/15/2018 11:00 AM Karson Bishop MD [facility]        Today's Diagnoses     Pneumonia due to infectious organism, unspecified laterality, unspecified part of lung    -  1    Leukocytosis, unspecified type        Paroxysmal atrial fibrillation (H)        Non-Hodgkin's lymphoma, unspecified body region, unspecified non-Hodgkin lymphoma type (H)          Care Instructions    I think you are coughing from a mild pneumonia that did not appear on your chest x-ray.    The Azithromycin (Z-pack) should kill the bacteria that is causing your pneumonia.    You should use the prescription inhaler albuterol every 4-6 hours to help clear the mucous out of your air ways.  You should use this until your cough resolves.    You can also use the over the counter medication guaifenesin (chest congestion relief) as directed to help thin your mucous.    The chlorpheniramine medication that I sent to your pharmacy will help you cough less and improve your congestion.    I also sent an prescription for Medrol dose pack that will help your chest congestion.    I am rechecking your white blood cell count today.  You also need your INR rechecked today.            Follow-ups after your visit        Your next 10 appointments already scheduled     Feb 21, 2018 11:15 AM CST   Anticoagulation Visit with  ANTICOAGULATION CLINIC   [facility] [place] ([facility])    [address]  [place] [id number] [phone]              Who to contact     If you have questions or need follow up information about today's clinic visit or your schedule please contact [facility] directly at [phone].  Normal or non-critical lab and imaging results will be communicated to you by MyChart, letter or phone  "within 4 business days after the clinic has received the results. If you do not hear from us within 7 days, please contact the clinic through [facility] or phone. If you have a critical or abnormal lab result, we will notify you by phone as soon as possible.  Submit refill requests through [facility] or call your pharmacy and they will forward the refill request to us. Please allow 3 business days for your refill to be completed.          Additional Information About Your Visit        [facility] Information     [facility] lets you send messages to your doctor, view your test results, renew your prescriptions, schedule appointments and more. To sign up, go to [email or web address] . Click on \"Log in\" on the left side of the screen, which will take you to the Welcome page. Then click on \"Sign up Now\" on the right side of the page.     You will be asked to enter the access code listed below, as well as some personal information. Please follow the directions to create your username and password.     Your access code is: [email or web address]  Expires: 4/10/2018 12:04 PM     Your access code will  in 90 days. If you need help or a new code, please call your [place] clinic or [phone].        Care EveryWhere ID     This is your Care EveryWhere ID. This could be used by other organizations to access your [place] medical records  [id number]        Your Vitals Were     Pulse Temperature Height Pulse Oximetry BMI (Body Mass Index)       87 97.1  F (36.2  C) (Oral) 5' 10\" (1.778 m) 92% 26.98 kg/m2        Blood Pressure from Last 3 Encounters:   02/15/18 104/62   18 116/60   18 131/70    Weight from Last 3 Encounters:   02/15/18 188 lb (85.3 kg)   18 188 lb (85.3 kg)   18 182 lb (82.6 kg)              We Performed the Following     CBC with platelets     INR          Today's Medication Changes          These changes are accurate as of 2/15/18 11:37 AM.  If you have any questions, ask your nurse or doctor.    "            Start taking these medicines.        Dose/Directions    Chlorpheniramine-DM 4-30 MG Tabs   Used for:  Pneumonia due to infectious organism, unspecified laterality, unspecified part of lung   Started by:  Karson Bishop MD        Dose:  1 tablet   Take 1 tablet by mouth every 6 hours as needed COUGH   Quantity:  56 tablet   Refills:  3       methylPREDNISolone 4 MG tablet   Commonly known as:  MEDROL DOSEPAK   Used for:  Pneumonia due to infectious organism, unspecified laterality, unspecified part of lung   Started by:  Karson Bishop MD        Follow package instructions   Quantity:  21 tablet   Refills:  0            Where to get your medicines      These medications were sent to [id number] - [place], [place] - [address]  [address] [facility] [address]     Phone:  [phone]     Chlorpheniramine-DM 4-30 MG Tabs    methylPREDNISolone 4 MG tablet                Primary Care Provider Office Phone # Fax #    Karson Bishop [phone]       [facility] [address] [address]        Equal Access to Services     [facility]: Hadii aad ku hadasho Soomaali, waaxda luqadaha, qaybta kaalmada adeegyada, waxay idiin haytedn trae salazar . So [facility] [place] [phone].    ATENCIÓN: Si habla español, tiene a randle disposición servicios gratuitos de asistencia lingüística. Llame al [phone].    We comply with applicable federal civil rights laws and [place] laws. We do not discriminate on the basis of race, color, national origin, age, disability, sex, sexual orientation, or gender identity.            Thank you!     Thank you for choosing [facility]  for your care. Our goal is always to provide you with excellent care. Hearing back from our patients is one way we can continue to improve our services. Please take a few minutes to complete the written survey that you may receive in the mail after your visit with  us. Thank you!             Your Updated Medication List - Protect others around you: Learn how to safely use, store and throw away your medicines at www.disposemymeds.org.          This list is accurate as of 2/15/18 11:37 AM.  Always use your most recent med list.                   Brand Name Dispense Instructions for use Diagnosis    albuterol 108 (90 BASE) MCG/ACT Inhaler    PROAIR HFA/PROVENTIL HFA/VENTOLIN HFA    1 Inhaler    Inhale 1-2 puffs into the lungs every 6 hours as needed for shortness of breath / dyspnea or wheezing    Upper respiratory tract infection, unspecified type       atorvastatin 10 MG tablet    LIPITOR    90 tablet    Take 1 tablet (10 mg) by mouth daily    Mixed hyperlipidemia       azithromycin 250 MG tablet    ZITHROMAX    6 tablet    Two tablets first day, then one tablet daily for four days.    Upper respiratory tract infection, unspecified type       beclomethasone 40 MCG/ACT Inhaler    QVAR    1 Inhaler    Inhale 2 puffs into the lungs 2 times daily    Upper respiratory tract infection, unspecified type       Chlorpheniramine-DM 4-30 MG Tabs     56 tablet    Take 1 tablet by mouth every 6 hours as needed COUGH    Pneumonia due to infectious organism, unspecified laterality, unspecified part of lung       hydrOXYzine 25 MG tablet    ATARAX    120 tablet    Take 1 to 2 tablets (25-50 mg) by mouth every 6 hours as needed for itching    Chronic pruritus       methylPREDNISolone 4 MG tablet    MEDROL DOSEPAK    21 tablet    Follow package instructions    Pneumonia due to infectious organism, unspecified laterality, unspecified part of lung       metoprolol succinate 100 MG 24 hr tablet    TOPROL-XL    90 tablet    Take 1 tablet (100 mg) by mouth daily    Benign essential hypertension       omeprazole 20 MG CR capsule    priLOSEC    180 capsule    TAKE TWO CAPSULES BY MOUTH DAILY    Gastrointestinal hemorrhage with melena       warfarin 5 MG tablet    COUMADIN    140 tablet    TAKE 1 TO 1.5  TABLETS (5 TO 7.5 MG) BY MOUTH DAILY. TAKE AS PRESCRIBED BY INR CLINIC. CURRENT DOSE IS 7.5 MG FOR 2 DAYS AND 5 MG FOR 5 DAYS.    Paroxysmal atrial fibrillation (H)

## 2018-02-15 NOTE — PATIENT INSTRUCTIONS
I think you are coughing from a mild pneumonia that did not appear on your chest x-ray.    The Azithromycin (Z-pack) should kill the bacteria that is causing your pneumonia.    You should use the prescription inhaler albuterol every 4-6 hours to help clear the mucous out of your air ways.  You should use this until your cough resolves.    You can also use the over the counter medication guaifenesin (chest congestion relief) as directed to help thin your mucous.    The chlorpheniramine medication that I sent to your pharmacy will help you cough less and improve your congestion.    I also sent an prescription for Medrol dose pack that will help your chest congestion.    I am rechecking your white blood cell count today.  You also need your INR rechecked today.

## 2018-02-15 NOTE — PROGRESS NOTES
SUBJECTIVE:   Hermilo Velasquez is a 85 year old male who presents to clinic today for the following health issues:      Chief Complaint   Patient presents with     URI recheck     patient coming in to follow-up on URI sx that presented about 2 weeks ago, patient reporting today that there is still no improvement perhaps a little worse even         Pleasant 85-year-old man with a 55-pack-year smoking history presented several days ago with symptoms of cough lasting 2 weeks.  He was started on an azithromycin pack.  Chest x-ray showed no infiltrate.  White blood cell count was noted to be 12.5.  Patient is feeling a little better on the azithromycin pack, but continues to complain of severe cough, thick greenish sputum production.  He denies dyspnea, fatigue or malaise.  He does have a history of non-Hodgkin's lymphoma.  He is followed at [facility] in the past.  However, I do not think he has had recent follow-up.  He has a history of atrial fibrillation and pulmonary emboli for which he is on Coumadin.    Problem list and histories reviewed & adjusted, as indicated.  Additional history: as documented    Patient Active Problem List   Diagnosis     Low back pain     Ventral hernia     Nonrheumatic aortic valve stenosis     GERD (gastroesophageal reflux disease)     Non Hodgkin's lymphoma (H): 2013     Microscopic hematuria     Health Care Home     CARDIOVASCULAR SCREENING; LDL GOAL LESS THAN 130     History of colonic polyps     Neuropathy     Anemia due to blood loss, acute     Physical deconditioning     Paroxysmal atrial fibrillation (H)     Need for SBE (subacute bacterial endocarditis) prophylaxis     RBBB (right bundle branch block)     Gastrointestinal hemorrhage with melena     Primary osteoarthritis of both knees     Primary osteoarthritis of left hip     Pulmonary embolism, bilateral (H)     S/P IVC filter     Long-term (current) use of anticoagulants [Z79.01]     Benign neoplasm of colon,  unspecified part of colon     Pulmonary nodules     Benign neoplasm of colon     Primary osteoarthritis of left knee     Essential hypertension with goal blood pressure less than 140/90     Non-Hodgkin's lymphoma, unspecified body region, unspecified non-Hodgkin lymphoma type (H)     Anticoagulated on Coumadin     Other chronic pulmonary embolism without acute cor pulmonale (H)     Epistaxis     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     Aftercare following left knee joint replacement surgery     Rash     Drainage from wound: left Knee     Lower extremity edema     Leg edema, left     S/P total knee arthroplasty     ACP (advance care planning)     Hyperlipidemia LDL goal <130     Past Surgical History:   Procedure Laterality Date     APPENDECTOMY       AS TOTAL KNEE ARTHROPLASTY       ESOPHAGOSCOPY, GASTROSCOPY, DUODENOSCOPY (EGD), COMBINED N/A 11/28/2015    Procedure: COMBINED ESOPHAGOSCOPY, GASTROSCOPY, DUODENOSCOPY (EGD);  Surgeon: Danis Castillo MD;  Location:  GI     HERNIA REPAIR  2006     HERNIORRHAPHY VENTRAL  4/17/2013    Procedure: HERNIORRHAPHY VENTRAL;  VENTRAL HERNIA REPAIR WITH MESH;  Surgeon: Patel Guzman MD;  Location:  OR     KNEE SURGERY      arthroscopic right knee surgery      REPAIR LIGAMENT ANKLE  2/23/2012    Procedure:REPAIR LIGAMENT ANKLE; LEFT TARSAL TUNNEL RELEASE OF KNOT OF ARIEL RELEASE; Surgeon:SAUL PUENTE; Location: OR     REPLACE VALVE AORTIC N/A 9/3/2015    Procedure: REPLACE VALVE AORTIC;  Surgeon: Antonino Mitchell MD;  Location:  OR     ROTATOR CUFF REPAIR RT/LT      bilateral     SPINE SURGERY      3 spine surgeries     TONSILLECTOMY       TURP         Social History   Substance Use Topics     Smoking status: Former Smoker     Packs/day: 2.00     Years: 55.00     Quit date: 1/22/1998     Smokeless tobacco: Never Used     Alcohol use 0.0 oz/week     0 Standard drinks or equivalent per week      Comment: occassionaly     Family  "History   Problem Relation Age of Onset     C.A.D. Father      Emphysema Father          Current Outpatient Prescriptions   Medication Sig Dispense Refill     methylPREDNISolone (MEDROL DOSEPAK) 4 MG tablet Follow package instructions 21 tablet 0     Chlorpheniramine-DM 4-30 MG TABS Take 1 tablet by mouth every 6 hours as needed COUGH 56 tablet 3     atorvastatin (LIPITOR) 10 MG tablet Take 1 tablet (10 mg) by mouth daily 90 tablet 3     albuterol (PROAIR HFA/PROVENTIL HFA/VENTOLIN HFA) 108 (90 BASE) MCG/ACT Inhaler Inhale 1-2 puffs into the lungs every 6 hours as needed for shortness of breath / dyspnea or wheezing 1 Inhaler 3     beclomethasone (QVAR) 40 MCG/ACT Inhaler Inhale 2 puffs into the lungs 2 times daily 1 Inhaler 3     azithromycin (ZITHROMAX) 250 MG tablet Two tablets first day, then one tablet daily for four days. 6 tablet 0     hydrOXYzine (ATARAX) 25 MG tablet Take 1 to 2 tablets (25-50 mg) by mouth every 6 hours as needed for itching 120 tablet 2     metoprolol (TOPROL-XL) 100 MG 24 hr tablet Take 1 tablet (100 mg) by mouth daily 90 tablet 3     warfarin (COUMADIN) 5 MG tablet TAKE 1 TO 1.5 TABLETS (5 TO 7.5 MG) BY MOUTH DAILY. TAKE AS PRESCRIBED BY INR CLINIC. CURRENT DOSE IS 7.5 MG FOR 2 DAYS AND 5 MG FOR 5 DAYS. 140 tablet 0     omeprazole (PRILOSEC) 20 MG CR capsule TAKE TWO CAPSULES BY MOUTH DAILY  180 capsule 2     Allergies   Allergen Reactions     Lidocaine Blisters     Allergy to lidocaine ointment     Lasix [Furosemide] Rash     Penicillins Rash     \"broke out from injection\" 60 yrs ago         Reviewed and updated as needed this visit by clinical staff       Reviewed and updated as needed this visit by Provider         ROS:  Constitutional, HEENT, cardiovascular, pulmonary, gi and gu systems are negative, except as otherwise noted.    Fortunately his rash and pruritus have improved    OBJECTIVE:     /62  Pulse 87  Temp 97.1  F (36.2  C) (Oral)  Ht 5' 10\" (1.778 m)  Wt 188 lb " (85.3 kg)  SpO2 92%  BMI 26.98 kg/m2  Body mass index is 26.98 kg/(m^2).  GENERAL: healthy, alert and no distress  EYES: Eyes grossly normal to inspection, PERRL and conjunctivae and sclerae normal  HENT: ear canals and TM's normal, nose and mouth without ulcers or lesions  NECK: no adenopathy, no asymmetry, masses, or scars and thyroid normal to palpation  RESP: Scattered mild wheezes and rhonchi in all fields of both lungs, good air movement, no use of accessory muscles, breathing is not labored, no dullness to percussion.  CV: The heart has an irregularly irregular rhythm with a normal rate.   ABDOMEN: soft, nontender, no hepatosplenomegaly, no masses and bowel sounds normal  MS: no gross musculoskeletal defects noted, no edema  NEURO: Normal strength and tone, mentation intact and speech normal  PSYCH: mentation appears normal, affect normal/bright    Diagnostic Test Results:  Results for orders placed or performed in visit on 02/12/18   XR Chest 2 Views    Narrative    CHEST TWO VIEWS February 12, 2018 2:18 PM     HISTORY: Upper respiratory tract infection, unspecified type.    COMPARISON: 4/24/2017.     FINDINGS: There are no acute infiltrates. The cardiac silhouette is  not enlarged. Pulmonary vasculature is unremarkable. There is a  sternotomy and vascular clips consistent with CABG. Artificial valve  also noted.      Impression    IMPRESSION: No acute disease.    FUENTES SIMON MD       ASSESSMENT/PLAN:         ICD-10-CM    1. Pneumonia due to infectious organism, unspecified laterality, unspecified part of lung J18.9 methylPREDNISolone (MEDROL DOSEPAK) 4 MG tablet     Chlorpheniramine-DM 4-30 MG TABS   2. Leukocytosis, unspecified type D72.829 CBC with platelets   3. Paroxysmal atrial fibrillation (H) I48.0 INR   4. Non-Hodgkin's lymphoma, unspecified body region, unspecified non-Hodgkin lymphoma type (H) C85.90      Given his leukocytosis, productive cough, I think that he probably has pneumonia.  There  is no obvious infiltrate on chest x-ray.  The pneumonia is probably mild.  I think that the azithromycin was a good choice.  There is the potential for interaction with Coumadin and he will have his INR checked today because of that.  Leukocytosis might be related to his underlying lymphoma as well, if it remains persistent, then follow-up with hematology/oncology might be necessary.  C patient instructions regarding symptomatic interventions.  He does have a long smoking history, he also has a prescription for inhaled steroids and bronchodilators.  He almost certainly has COPD.  There is likely an element of COPD exacerbation at play here as well given the wheezing on exam.  As such, start Medrol Dosepak.  Formal spirometry might be helpful if symptoms are recurrent.  He was educated on the difference between COPD controlling medications (Qvar) and rescue inhalers albuterol.  It might be helpful to use his rescue inhaler on a scheduled basis until his cough improves to help with airway clearance and I did my best to explain this to him.    Total time more than 26 minutes the majority of which was spent counseling and coordinating care.      Patient Instructions   I think you are coughing from a mild pneumonia that did not appear on your chest x-ray.    The Azithromycin (Z-pack) should kill the bacteria that is causing your pneumonia.    You should use the prescription inhaler albuterol every 4-6 hours to help clear the mucous out of your air ways.  You should use this until your cough resolves.    You can also use the over the counter medication guaifenesin (chest congestion relief) as directed to help thin your mucous.    The chlorpheniramine medication that I sent to your pharmacy will help you cough less and improve your congestion.    I also sent an prescription for Medrol dose pack that will help your chest congestion.    I am rechecking your white blood cell count today.  You also need your INR rechecked  today.        He will return if his symptoms are persistent after 2-3 weeks, sooner if new symptoms develop or symptoms worsen    Karson Bishop MD  [facility]

## 2018-02-15 NOTE — MR AVS SNAPSHOT
Hermilo MADRID Eva   2/15/2018 11:45 AM   Anticoagulation Therapy Visit    Description:  85 year old male   Provider:   ANTICOAGULATION CLINIC   Department:   Nurse           INR as of 2/15/2018     Today's INR 2.9      Anticoagulation Summary as of 2/15/2018     INR goal 2.0-3.0   Today's INR 2.9   Full instructions 5 mg every day   Next INR check 3/29/2018    Indications   Long-term (current) use of anticoagulants [Z79.01] [Z79.01]  Pulmonary embolism  bilateral (H) [I26.99]  Paroxysmal atrial fibrillation (H) [I48.0]         Your next Anticoagulation Clinic appointment(s)     Mar 29, 2018  1:30 PM CDT   Anticoagulation Visit with  ANTICOAGULATION CLINIC   [facility] ([facility]    [address]  [place] [address]   [phone]              Contact Numbers     Clinic Number:         February 2018 Details    Sun Mon Tue Wed Thu Fri Sat         1               2               3                 4               5               6               7               8               9               10                 11               12               13               14               15      5 mg   See details      16      5 mg         17      5 mg           18      5 mg         19      5 mg         20      5 mg         21      5 mg         22      5 mg         23      5 mg         24      5 mg           25      5 mg         26      5 mg         27      5 mg         28      5 mg             Date Details   02/15 This INR check               How to take your warfarin dose     To take:  5 mg Take 1 of the 5 mg tablets.           March 2018 Details    Sun Mon Tue Wed Thu Fri Sat         1      5 mg         2      5 mg         3      5 mg           4      5 mg         5      5 mg         6      5 mg         7      5 mg         8      5 mg         9      5 mg         10      5 mg           11      5 mg         12      5 mg         13      5 mg         14      5 mg         15      5 mg          16      5 mg         17      5 mg           18      5 mg         19      5 mg         20      5 mg         21      5 mg         22      5 mg         23      5 mg         24      5 mg           25      5 mg         26      5 mg         27      5 mg         28      5 mg         29            30               31                Date Details   No additional details    Date of next INR:  3/29/2018         How to take your warfarin dose     To take:  5 mg Take 1 of the 5 mg tablets.

## 2018-02-15 NOTE — LETTER
[facility]  [address] [address]  [address]  [place]  [address]  Tel: [phone]    February 19, 2018    Hermilo MADRID [place]  [address] [facility] [address]        Dear Mr. Velasquez,    The following letter pertains to your most recent diagnostic tests:     Hermilo, the good news is that your white blood cell count is better, which means your lung infection is responding to the azithromycin antibiotic that Dr. Boone prescribed.  I think your cough should get better quickly with the additional medications that I prescribed.       We incidentally discovered new mild anemia (low hemoglobin).  This is not dangerous but needs to be followed up.  When you come in next for your INR (coumadin blood test) (around 3/29/18), I would also have the lab draw another hemoglobin level.  If the hemoglobin stays persistently low, then we may need to look in to it further and treat the anemia.     If you have any further questions or problems, please contact our office.      Sincerely,    Karson Bishop MD/boris          Enclosure: Lab Results                            Results for orders placed or performed in visit on 02/15/18   CBC with platelets   Result Value Ref Range    WBC 10.5 4.0 - 11.0 10e9/L    RBC Count 3.72 (L) 4.4 - 5.9 10e12/L    Hemoglobin 10.2 (L) 13.3 - 17.7 g/dL    Hematocrit 33.6 (L) 40.0 - 53.0 %    MCV 90 78 - 100 fl    MCH 27.4 26.5 - 33.0 pg    MCHC 30.4 (L) 31.5 - 36.5 g/dL    RDW 15.5 (H) 10.0 - 15.0 %    Platelet Count 209 150 - 450 10e9/L

## 2018-02-15 NOTE — PROGRESS NOTES
ANTICOAGULATION FOLLOW-UP CLINIC VISIT    Patient Name:  Hermilo Velasquez  Date:  2/15/2018  Contact Type:  Face to Face    SUBJECTIVE:     Patient Findings     Positives No Problem Findings           OBJECTIVE    INR Protime   Date Value Ref Range Status   02/15/2018 2.9 (A) 0.86 - 1.14 Final       ASSESSMENT / PLAN  INR assessment THER    Recheck INR In: 6 WEEKS    INR Location Clinic      Anticoagulation Summary as of 2/15/2018     INR goal 2.0-3.0   Today's INR 2.9   Maintenance plan 5 mg (5 mg x 1) every day   Full instructions 5 mg every day   Weekly total 35 mg   Weekly max dose 45 mg   No change documented Mireya Otto RN   Plan last modified Janneth Allen RN (8/22/2017)   Next INR check 3/29/2018   Target end date Indefinite    Indications   Long-term (current) use of anticoagulants [Z79.01] [Z79.01]  Pulmonary embolism  bilateral (H) [I26.99]  Paroxysmal atrial fibrillation (H) [I48.0]         Anticoagulation Episode Summary     INR check location [facility]    Preferred lab     Send INR reminders to [facility]    Comments       Anticoagulation Care Providers     Provider Role Specialty Phone number    LisaafricaBucky MD Responsible Internal Medicine [phone]            See the Encounter Report to view Anticoagulation Flowsheet and Dosing Calendar (Go to Encounters tab in chart review, and find the Anticoagulation Therapy Visit)    Dosage adjustment made based on physician directed care plan.    Mireya Otto RN

## 2018-02-15 NOTE — TELEPHONE ENCOUNTER
Prior Authorization Retail Medication Request  Medication/Dose: hydrOXYzine (ATARAX) 25 MG tablet  Diagnosis and ICD code: Chronic pruritus [L29.9]   New/Renewal/Insurance Change PA: New  Previously Tried and Failed Therapies: cetirizine and levocetirizine    Insurance ID (if provided): [id number]  Insurance Phone (if provided): [phone]    Any additional info from fax request:     If you received a fax notification from an outside Pharmacy:  Pharmacy Name:[facility]  Pharmacy #:[phone]  Pharmacy Fax:[phone]

## 2018-02-16 NOTE — TELEPHONE ENCOUNTER
PA Initiation    Medication: hydrOXYzine (ATARAX) 25 MG tablet  Insurance Company: [employer] - Phone [phone] Fax [phone]  Pharmacy Filling the Rx: [id number] - [place], [place] - [address] [facility]  Filling Pharmacy Phone: [phone]  Filling Pharmacy Fax: [phone]  Start Date: 2/16/2018    Central Prior Authorization Team   Phone: [phone]

## 2018-02-18 NOTE — PROGRESS NOTES
The following letter pertains to your most recent diagnostic tests:    Hermilo, the good news is that your white blood cell count is better, which means your lung infection is responding to the azithromycin antibiotic that Dr. Boone prescribed.  I think your cough should get better quickly with the additional medications that I prescribed.      We incidentally discovered new mild anemia (low hemoglobin).  This is not dangerous but needs to be followed up.  When you come in next for your INR (coumadin blood test) (around 3/29/18), I would also have the lab draw another hemoglobin level.  If the hemoglobin stays persistently low, then we may need to look in to it further and treat the anemia.       Sincerely,    Dr. Bishop

## 2018-02-18 NOTE — PROGRESS NOTES
Can you call this patient and review the contents of my lab letter with him?  he may need a little explanation

## 2018-02-19 ENCOUNTER — TRANSFERRED RECORDS (OUTPATIENT)
Dept: HEALTH INFORMATION MANAGEMENT | Facility: CLINIC | Age: 83
End: 2018-02-19

## 2018-02-19 ENCOUNTER — TELEPHONE (OUTPATIENT)
Dept: FAMILY MEDICINE | Facility: CLINIC | Age: 83
End: 2018-02-19

## 2018-02-19 NOTE — TELEPHONE ENCOUNTER
Prior Authorization Approval    Authorization Effective Date: 1/17/2018  Authorization Expiration Date: 2/16/2019  Medication: hydrOXYzine (ATARAX) 25 MG tablet- APPROVED  Approved Dose/Quantity: 120 per 15 days  Reference #: [id number]   Insurance Company: [employer] - Phone [phone] Fax [phone]  Expected CoPay: $20.91     CoPay Card Available:      Foundation Assistance Needed:    Which Pharmacy is filling the prescription (Not needed for infusion/clinic administered): [id number] - [place], [place] [facility] [address]  Pharmacy Notified: YesComment:  per tech medication was already sold and picked up for a $20.91 copay  Patient Notified: Yes

## 2018-02-19 NOTE — TELEPHONE ENCOUNTER
Left VM for patient to call clinic back to discuss most recent result notes (below) from Dr. Bishop.    Alisha Meza, RN      Notes Recorded by Alisha Meza RN on 2/19/2018 at 4:46 PM  Left VM for patient to call clinic back.   Started Telephone Encounter.    Alisha Meza RN    ------    Notes Recorded by Yen Oropeza CMA on 2/19/2018 at 10:49 AM  Letter sent to patient with lab results.    Yen Oropeza MA  ------    Notes Recorded by Karson Bishop MD on 2/18/2018 at 6:18 AM  Can you call this patient and review the contents of my lab letter with him?  he may need a little explanation  ------    Notes Recorded by Karson Bishop MD on 2/18/2018 at 6:17 AM  The following letter pertains to your most recent diagnostic tests:    Hemrilo, the good news is that your white blood cell count is better, which means your lung infection is responding to the azithromycin antibiotic that Dr. Boone prescribed.  I think your cough should get better quickly with the additional medications that I prescribed.      We incidentally discovered new mild anemia (low hemoglobin).  This is not dangerous but needs to be followed up.  When you come in next for your INR (coumadin blood test) (around 3/29/18), I would also have the lab draw another hemoglobin level.  If the hemoglobin stays persistently low, then we may need to look in to it further and treat the anemia.       Sincerely,    Dr. Bishop

## 2018-02-20 NOTE — TELEPHONE ENCOUNTER
Dr Bishop is not in clinic this week but as covering DOD I'm sure that Dr Bishop would support the podiatrists recommendation for additional prednisone therapy

## 2018-02-20 NOTE — TELEPHONE ENCOUNTER
Spoke with patient:     Relayed message below. Pt understands and agrees with plan to recheck HGB when he returns in March for his INR.       To PCP: Podiatry (outside provider) prescribed Prednisone yesterday for foot infection (also having ESR and CRP draw). Patient is concerned because he just completed Medrol Dose Eusebio per Dr. Bishop for PNA. Hoping for an 'OK' to take the Prednisone Rx from Podiatrist from PCP/Covering Provider because he feels it may be too much steroid in a row. Please advise    Thank you,   Chrissy ALICEA RN

## 2018-02-20 NOTE — TELEPHONE ENCOUNTER
I called patient and spoke with him.   Explained that additional prednisone therapy is appropriate per Podiatry.   I did explain that he should have INR drawn tomorrow when he is in for Lab appointment  Scheduled patient for INR check at 11:45am following lab draw at 11:00am.    Patient agreed with plan.     Alisha Meza RN

## 2018-02-21 ENCOUNTER — ANTICOAGULATION THERAPY VISIT (OUTPATIENT)
Dept: NURSING | Facility: CLINIC | Age: 83
End: 2018-02-21
Payer: COMMERCIAL

## 2018-02-21 ENCOUNTER — RESULTS ONLY (OUTPATIENT)
Dept: OTHER | Facility: CLINIC | Age: 83
End: 2018-02-21

## 2018-02-21 DIAGNOSIS — M12.572 TRAUMATIC ARTHROPATHY OF ANKLE AND FOOT, LEFT: ICD-10-CM

## 2018-02-21 DIAGNOSIS — M67.02 SHORTNESS, TENDON, ACHILLES ACQUIRED, LEFT: Primary | ICD-10-CM

## 2018-02-21 DIAGNOSIS — S93.492S SPRAIN OF ANTERIOR TALOFIBULAR LIGAMENT, LEFT, SEQUELA: ICD-10-CM

## 2018-02-21 DIAGNOSIS — S93.612S: ICD-10-CM

## 2018-02-21 DIAGNOSIS — M25.375 INSTABILITY OF FOOT JOINT, LEFT: ICD-10-CM

## 2018-02-21 DIAGNOSIS — M25.372 INSTABILITY OF ANKLE, LEFT: ICD-10-CM

## 2018-02-21 DIAGNOSIS — M62.472 CONTRACTURE OF MUSCLE OF LEFT FOOT: ICD-10-CM

## 2018-02-21 LAB
CRP SERPL-MCNC: 12 MG/L (ref 0–8)
ERYTHROCYTE [DISTWIDTH] IN BLOOD BY AUTOMATED COUNT: 14.9 % (ref 10–15)
ERYTHROCYTE [SEDIMENTATION RATE] IN BLOOD BY WESTERGREN METHOD: 32 MM/H (ref 0–20)
HCT VFR BLD AUTO: 35.4 % (ref 40–53)
HGB BLD-MCNC: 10.7 G/DL (ref 13.3–17.7)
INR POINT OF CARE: 2.5 (ref 0.86–1.14)
MCH RBC QN AUTO: 27.2 PG (ref 26.5–33)
MCHC RBC AUTO-ENTMCNC: 30.2 G/DL (ref 31.5–36.5)
MCV RBC AUTO: 90 FL (ref 78–100)
PLATELET # BLD AUTO: 230 10E9/L (ref 150–450)
RBC # BLD AUTO: 3.93 10E12/L (ref 4.4–5.9)
WBC # BLD AUTO: 10.3 10E9/L (ref 4–11)

## 2018-02-21 PROCEDURE — 86431 RHEUMATOID FACTOR QUANT: CPT | Performed by: ORTHOPAEDIC SURGERY

## 2018-02-21 PROCEDURE — 86140 C-REACTIVE PROTEIN: CPT | Performed by: ORTHOPAEDIC SURGERY

## 2018-02-21 PROCEDURE — 84439 ASSAY OF FREE THYROXINE: CPT | Performed by: ORTHOPAEDIC SURGERY

## 2018-02-21 PROCEDURE — 84550 ASSAY OF BLOOD/URIC ACID: CPT | Performed by: ORTHOPAEDIC SURGERY

## 2018-02-21 PROCEDURE — 81374 HLA I TYPING 1 ANTIGEN LR: CPT | Performed by: ORTHOPAEDIC SURGERY

## 2018-02-21 PROCEDURE — 85610 PROTHROMBIN TIME: CPT | Mod: QW

## 2018-02-21 PROCEDURE — 86038 ANTINUCLEAR ANTIBODIES: CPT | Performed by: ORTHOPAEDIC SURGERY

## 2018-02-21 PROCEDURE — 85652 RBC SED RATE AUTOMATED: CPT | Performed by: ORTHOPAEDIC SURGERY

## 2018-02-21 PROCEDURE — 85027 COMPLETE CBC AUTOMATED: CPT | Performed by: ORTHOPAEDIC SURGERY

## 2018-02-21 PROCEDURE — 36416 COLLJ CAPILLARY BLOOD SPEC: CPT

## 2018-02-21 PROCEDURE — 84443 ASSAY THYROID STIM HORMONE: CPT | Performed by: ORTHOPAEDIC SURGERY

## 2018-02-21 NOTE — PROGRESS NOTES
ANTICOAGULATION FOLLOW-UP CLINIC VISIT    Patient Name:  Hermilo Velasquez  Date:  2/21/2018  Contact Type:  Face to Face    SUBJECTIVE:     Patient Findings     Positives No Problem Findings           OBJECTIVE    INR Protime   Date Value Ref Range Status   02/21/2018 2.5 (A) 0.86 - 1.14 Final       ASSESSMENT / PLAN  INR assessment THER    Recheck INR In: 4 WEEKS    INR Location Clinic      Anticoagulation Summary as of 2/21/2018     INR goal 2.0-3.0   Today's INR 2.5   Maintenance plan 5 mg (5 mg x 1) every day   Full instructions 5 mg every day   Weekly total 35 mg   Weekly max dose 45 mg   No change documented Analisa Vigil RN   Plan last modified Janneth Allen RN (8/22/2017)   Next INR check 3/29/2018   Target end date Indefinite    Indications   Long-term (current) use of anticoagulants [Z79.01] [Z79.01]  Pulmonary embolism  bilateral (H) [I26.99]  Paroxysmal atrial fibrillation (H) [I48.0]         Anticoagulation Episode Summary     INR check location [facility]    Preferred lab     Send INR reminders to [facility]    Comments       Anticoagulation Care Providers     Provider Role Specialty Phone number    Lisaafrica Bucky Munoz MD Responsible Internal Medicine [phone]            See the Encounter Report to view Anticoagulation Flowsheet and Dosing Calendar (Go to Encounters tab in chart review, and find the Anticoagulation Therapy Visit)    Dosage adjustment made based on physician directed care plan. INR 2.5. No change    Analisa Vigil, CECILIA

## 2018-02-21 NOTE — MR AVS SNAPSHOT
Hermilo MADRID Eva   2/21/2018 11:45 AM   Anticoagulation Therapy Visit    Description:  85 year old male   Provider:   ANTICOAGULATION CLINIC   Department:   Nurse           INR as of 2/21/2018     Today's INR 2.5      Anticoagulation Summary as of 2/21/2018     INR goal 2.0-3.0   Today's INR 2.5   Full instructions 5 mg every day   Next INR check 3/29/2018    Indications   Long-term (current) use of anticoagulants [Z79.01] [Z79.01]  Pulmonary embolism  bilateral (H) [I26.99]  Paroxysmal atrial fibrillation (H) [I48.0]         Your next Anticoagulation Clinic appointment(s)     Mar 29, 2018  1:30 PM CDT   Anticoagulation Visit with  ANTICOAGULATION CLINIC   [facility]    [address]  [place] [address]   [phone]              Contact Numbers     Clinic Number:         February 2018 Details    Sun Mon Tue Wed Thu Fri Sat         1               2               3                 4               5               6               7               8               9               10                 11               12               13               14               15               16               17                 18               19               20               21      5 mg   See details      22      5 mg         23      5 mg         24      5 mg           25      5 mg         26      5 mg         27      5 mg         28      5 mg             Date Details   02/21 This INR check               How to take your warfarin dose     To take:  5 mg Take 1 of the 5 mg tablets.           March 2018 Details    Sun Mon Tue Wed Thu Fri Sat         1      5 mg         2      5 mg         3      5 mg           4      5 mg         5      5 mg         6      5 mg         7      5 mg         8      5 mg         9      5 mg         10      5 mg           11      5 mg         12      5 mg         13      5 mg         14      5 mg         15      5 mg         16      5 mg         17       5 mg           18      5 mg         19      5 mg         20      5 mg         21      5 mg         22      5 mg         23      5 mg         24      5 mg           25      5 mg         26      5 mg         27      5 mg         28      5 mg         29            30               31                Date Details   No additional details    Date of next INR:  3/29/2018         How to take your warfarin dose     To take:  5 mg Take 1 of the 5 mg tablets.

## 2018-02-22 LAB
ANA SER QL IF: NEGATIVE
HLA-B27 QL NAA+PROBE: NORMAL
RHEUMATOID FACT SER NEPH-ACNC: <20 IU/ML (ref 0–20)
T4 FREE SERPL-MCNC: 1.21 NG/DL (ref 0.76–1.46)
TSH SERPL DL<=0.005 MIU/L-ACNC: 2.38 MU/L (ref 0.4–4)
URATE SERPL-MCNC: 4.8 MG/DL (ref 3.5–7.2)

## 2018-02-26 LAB
B LOCUS: NORMAL
B27TEST METHOD: NORMAL

## 2018-03-07 ENCOUNTER — TELEPHONE (OUTPATIENT)
Dept: FAMILY MEDICINE | Facility: CLINIC | Age: 83
End: 2018-03-07

## 2018-03-07 DIAGNOSIS — G62.9 PERIPHERAL POLYNEUROPATHY: Primary | ICD-10-CM

## 2018-03-07 RX ORDER — GABAPENTIN 100 MG/1
CAPSULE ORAL
Qty: 270 CAPSULE | Refills: 3 | Status: SHIPPED | OUTPATIENT
Start: 2018-03-07 | End: 2018-03-19

## 2018-03-07 NOTE — TELEPHONE ENCOUNTER
See below, requesting lyrica and reports this has been discussed with PCP.  Last seen 2/15/18     If ordered, pended pharmacy, and please advise how soon for F/U (1 mo?) or with next INR at end of month? .  Or please advise if needing to be seen for RX sooner.    Dione Giron RN

## 2018-03-07 NOTE — TELEPHONE ENCOUNTER
Prescription for gabapentin sent to pharmacy  He can start medication  Recommend follow up visit in 2-3 weeks to assess therapy and titrate dose if needed

## 2018-03-07 NOTE — TELEPHONE ENCOUNTER
Pt has discussed taking Lyrica with  recent OV's and   would like to try it now as his neurapathy has increased.  He's tried everything and pain is getting worse.  [facility] [address] [place].  He may be reached at [phone], ok to leave a message.

## 2018-03-08 NOTE — TELEPHONE ENCOUNTER
I called patient and spoke with him.   Explained that script was sent into pharmacy  Scheduled patient for 3 week med follow-up with Dr. Bishop when patient comes into clinic for INR.     Alisha Meza RN

## 2018-03-19 ENCOUNTER — OFFICE VISIT (OUTPATIENT)
Dept: FAMILY MEDICINE | Facility: CLINIC | Age: 83
End: 2018-03-19
Payer: COMMERCIAL

## 2018-03-19 ENCOUNTER — TELEPHONE (OUTPATIENT)
Dept: FAMILY MEDICINE | Facility: CLINIC | Age: 83
End: 2018-03-19

## 2018-03-19 VITALS
TEMPERATURE: 98.6 F | SYSTOLIC BLOOD PRESSURE: 123 MMHG | OXYGEN SATURATION: 96 % | HEART RATE: 72 BPM | DIASTOLIC BLOOD PRESSURE: 59 MMHG | HEIGHT: 70 IN | WEIGHT: 190 LBS | RESPIRATION RATE: 18 BRPM | BODY MASS INDEX: 27.2 KG/M2

## 2018-03-19 DIAGNOSIS — D64.9 ANEMIA, UNSPECIFIED TYPE: ICD-10-CM

## 2018-03-19 DIAGNOSIS — G62.9 NEUROPATHY: ICD-10-CM

## 2018-03-19 DIAGNOSIS — R60.9 EDEMA, UNSPECIFIED TYPE: Primary | ICD-10-CM

## 2018-03-19 PROCEDURE — 99214 OFFICE O/P EST MOD 30 MIN: CPT | Performed by: INTERNAL MEDICINE

## 2018-03-19 RX ORDER — DULOXETIN HYDROCHLORIDE 30 MG/1
CAPSULE, DELAYED RELEASE ORAL
Qty: 60 CAPSULE | Refills: 2 | Status: SHIPPED | OUTPATIENT
Start: 2018-03-19 | End: 2018-07-23

## 2018-03-19 NOTE — PROGRESS NOTES
SUBJECTIVE:                                                    Hermilo Velasquez is a 85 year old male who presents to clinic today for the following health issues:        Edema      Duration: 3 days    Description (location/character/radiation): bilateral feet-up to knee; Pain in both feet    Intensity:  moderate    Accompanying signs and symptoms: Swelling; Pain    History (similar episodes/previous evaluation): None    Precipitating or alleviating factors: None    Therapies tried and outcome: None       85 year old man with long history of peripheral neuropathy  Evaluation for reversible causes in my previous clinic revealed diagnosis of lymphoma   He contacted me by phone requesting medication to help with symptoms  Gabapentin was prescribed   The gabapentin did not help his symptoms   He noted swelling several in both feet several days after starting  Swelling is now severe   No associated orthopnea, PND or new edema   He is on coumadin for atrial fibrillation and history of DVT/PE        Problem list and histories reviewed & adjusted, as indicated.  Additional history: as documented    Patient Active Problem List   Diagnosis     Low back pain     Ventral hernia     Nonrheumatic aortic valve stenosis     GERD (gastroesophageal reflux disease)     Non Hodgkin's lymphoma (H): 2013     Microscopic hematuria     Health Care Home     CARDIOVASCULAR SCREENING; LDL GOAL LESS THAN 130     History of colonic polyps     Neuropathy     Anemia due to blood loss, acute     Physical deconditioning     Paroxysmal atrial fibrillation (H)     Need for SBE (subacute bacterial endocarditis) prophylaxis     RBBB (right bundle branch block)     Gastrointestinal hemorrhage with melena     Primary osteoarthritis of both knees     Primary osteoarthritis of left hip     Pulmonary embolism, bilateral (H)     S/P IVC filter     Long-term (current) use of anticoagulants [Z79.01]     Benign neoplasm of colon, unspecified part of colon      Pulmonary nodules     Benign neoplasm of colon     Primary osteoarthritis of left knee     Essential hypertension with goal blood pressure less than 140/90     Non-Hodgkin's lymphoma, unspecified body region, unspecified non-Hodgkin lymphoma type (H)     Anticoagulated on Coumadin     Other chronic pulmonary embolism without acute cor pulmonale (H)     Epistaxis     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     Aftercare following left knee joint replacement surgery     Rash     Drainage from wound: left Knee     Lower extremity edema     Leg edema, left     S/P total knee arthroplasty     ACP (advance care planning)     Hyperlipidemia LDL goal <130     Past Surgical History:   Procedure Laterality Date     APPENDECTOMY       AS TOTAL KNEE ARTHROPLASTY       ESOPHAGOSCOPY, GASTROSCOPY, DUODENOSCOPY (EGD), COMBINED N/A 11/28/2015    Procedure: COMBINED ESOPHAGOSCOPY, GASTROSCOPY, DUODENOSCOPY (EGD);  Surgeon: Danis Castillo MD;  Location:  GI     HERNIA REPAIR  2006     HERNIORRHAPHY VENTRAL  4/17/2013    Procedure: HERNIORRHAPHY VENTRAL;  VENTRAL HERNIA REPAIR WITH MESH;  Surgeon: Patel Guzman MD;  Location:  OR     KNEE SURGERY      arthroscopic right knee surgery      REPAIR LIGAMENT ANKLE  2/23/2012    Procedure:REPAIR LIGAMENT ANKLE; LEFT TARSAL TUNNEL RELEASE OF KNOT OF ARIEL RELEASE; Surgeon:SAUL PUENTE; Location: OR     REPLACE VALVE AORTIC N/A 9/3/2015    Procedure: REPLACE VALVE AORTIC;  Surgeon: Antonino Mitchell MD;  Location:  OR     ROTATOR CUFF REPAIR RT/LT      bilateral     SPINE SURGERY      3 spine surgeries     TONSILLECTOMY       TURP         Social History   Substance Use Topics     Smoking status: Former Smoker     Packs/day: 2.00     Years: 55.00     Quit date: 1/22/1998     Smokeless tobacco: Never Used     Alcohol use 0.0 oz/week     0 Standard drinks or equivalent per week      Comment: occassionaly     Family History   Problem Relation  "Age of Onset     C.A.D. Father      Emphysema Father          Current Outpatient Prescriptions   Medication Sig Dispense Refill     DULoxetine (CYMBALTA) 30 MG EC capsule One capsule once daily for one week then increase to two capsules once daily (for neuropathy). 60 capsule 2     Chlorpheniramine-DM 4-30 MG TABS Take 1 tablet by mouth every 6 hours as needed COUGH 56 tablet 3     atorvastatin (LIPITOR) 10 MG tablet Take 1 tablet (10 mg) by mouth daily 90 tablet 3     albuterol (PROAIR HFA/PROVENTIL HFA/VENTOLIN HFA) 108 (90 BASE) MCG/ACT Inhaler Inhale 1-2 puffs into the lungs every 6 hours as needed for shortness of breath / dyspnea or wheezing 1 Inhaler 3     beclomethasone (QVAR) 40 MCG/ACT Inhaler Inhale 2 puffs into the lungs 2 times daily 1 Inhaler 3     hydrOXYzine (ATARAX) 25 MG tablet Take 1 to 2 tablets (25-50 mg) by mouth every 6 hours as needed for itching 120 tablet 2     metoprolol (TOPROL-XL) 100 MG 24 hr tablet Take 1 tablet (100 mg) by mouth daily 90 tablet 3     warfarin (COUMADIN) 5 MG tablet TAKE 1 TO 1.5 TABLETS (5 TO 7.5 MG) BY MOUTH DAILY. TAKE AS PRESCRIBED BY INR CLINIC. CURRENT DOSE IS 7.5 MG FOR 2 DAYS AND 5 MG FOR 5 DAYS. 140 tablet 0     omeprazole (PRILOSEC) 20 MG CR capsule TAKE TWO CAPSULES BY MOUTH DAILY  180 capsule 2     Allergies   Allergen Reactions     Gabapentin      Swelling       Lidocaine Blisters     Allergy to lidocaine ointment     Lasix [Furosemide] Rash     Penicillins Rash     \"broke out from injection\" 60 yrs ago         ROS:  Constitutional, HEENT, cardiovascular, pulmonary, gi and gu systems are negative, except as otherwise noted.    OBJECTIVE:     /59 (BP Location: Right arm, Patient Position: Chair, Cuff Size: Adult Large)  Pulse 72  Temp 98.6  F (37  C) (Tympanic)  Resp 18  Ht 5' 10\" (1.778 m)  Wt 190 lb (86.2 kg)  SpO2 96%  BMI 27.26 kg/m2  Body mass index is 27.26 kg/(m^2).  GENERAL: healthy, alert and no distress  RESP: lungs clear to " auscultation - no rales, rhonchi or wheezes  CV: The heart has an irregularly irregular rhythm with a normal rate  MS: 2+ edema in both legs to just below the knee   NEURO: Normal strength and tone, mentation intact and speech normal  PSYCH: mentation appears normal, affect normal/bright    Diagnostic Test Results:  none     ASSESSMENT/PLAN:       1. Edema, unspecified type  This is probably a gabapentin side effects superimposed on underlying venous insufficiency due to history of previous DVTs  Doubt active DVT due to the fact that he is anticoagulated   Doubt CHF due to no other signs or symptoms of decompensated heart failuer   Stop gabapentin  Both feet wrapped in ACE compression wraps  Educated patient on how to wrap legs daily in AM and remove at bedtime or when he can elevate his legs  Advised elevating legs as much as possible  Advised avoiding diet salt     2. Neuropathy  He would like to try something else for the neuropathy symptom   Discussed options  Will try duloxetine   Side effects and risks discussed  Return in 3 weeks to assess therapy and titrate dose   - DULoxetine (CYMBALTA) 30 MG EC capsule; One capsule once daily for one week then increase to two capsules once daily (for neuropathy).  Dispense: 60 capsule; Refill: 2    Anemia- he does not want to have his blood rechecked today.  He would consider it when he returns to follow up on the cymbalata    Total time > 25 minutes mostly counseling and coordinating care     Follow up in 3 weeks or sooner as needed     Karson Bishop MD  [facility]

## 2018-03-19 NOTE — NURSING NOTE
"Chief Complaint   Patient presents with     Edema       Initial /59 (BP Location: Right arm, Patient Position: Chair, Cuff Size: Adult Large)  Pulse 72  Temp 98.6  F (37  C) (Tympanic)  Resp 18  Ht 5' 10\" (1.778 m)  Wt 190 lb (86.2 kg)  SpO2 96%  BMI 27.26 kg/m2 Estimated body mass index is 27.26 kg/(m^2) as calculated from the following:    Height as of this encounter: 5' 10\" (1.778 m).    Weight as of this encounter: 190 lb (86.2 kg).  Medication Reconciliation: complete   Reyna Ray CMA (AAMA)      "

## 2018-03-19 NOTE — MR AVS SNAPSHOT
"              After Visit Summary   3/19/2018    Hermilo Velasquez    MRN: [id number]           Patient Information     Date Of Birth          11/3/1932        Visit Information        Provider Department      3/19/2018 2:30 PM Karson Bishop MD [facility]        Today's Diagnoses     Edema, unspecified type    -  1    Neuropathy        Anemia, unspecified type           Follow-ups after your visit        Your next 10 appointments already scheduled     Mar 29, 2018  1:30 PM CDT   Anticoagulation Visit with [facility]ULATION [facility]IC   [facility]    [address]  [place] [address] [phone]              Who to contact     If you have questions or need follow up information about today's clinic visit or your schedule please contact [facility] directly at [phone].  Normal or non-critical lab and imaging results will be communicated to you by MyChart, letter or phone within 4 business days after the clinic has received the results. If you do not hear from us within 7 days, please contact the clinic through MyChart or phone. If you have a critical or abnormal lab result, we will notify you by phone as soon as possible.  Submit refill requests through [employer] or call your pharmacy and they will forward the refill request to us. Please allow 3 business days for your refill to be completed.          Additional Information About Your Visit        MyChart Information     [employer] lets you send messages to your doctor, view your test results, renew your prescriptions, schedule appointments and more. To sign up, go to [email or web address] . Click on \"Log in\" on the left side of the screen, which will take you to the Welcome page. Then click on \"Sign up Now\" on the right side of the page.     You will be asked to enter the access code listed below, as well as some personal information. Please follow the directions to create your username and " "password.     Your access code is: [email or web address]  Expires: 4/10/2018  1:04 PM     Your access code will  in 90 days. If you need help or a new code, please call your [facility] or [phone].        Care EveryWhere ID     This is your Care EveryWhere ID. This could be used by other organizations to access your [facility] medical records  [id number]        Your Vitals Were     Pulse Temperature Respirations Height Pulse Oximetry BMI (Body Mass Index)    72 98.6  F (37  C) (Tympanic) 18 5' 10\" (1.778 m) 96% 27.26 kg/m2       Blood Pressure from Last 3 Encounters:   18 123/59   02/15/18 104/62   18 116/60    Weight from Last 3 Encounters:   18 190 lb (86.2 kg)   02/15/18 188 lb (85.3 kg)   18 188 lb (85.3 kg)              Today, you had the following     No orders found for display         Today's Medication Changes          These changes are accurate as of 3/19/18  3:10 PM.  If you have any questions, ask your nurse or doctor.               Start taking these medicines.        Dose/Directions    DULoxetine 30 MG EC capsule   Commonly known as:  CYMBALTA   Used for:  Neuropathy   Started by:  Karson Bishop MD        One capsule once daily for one week then increase to two capsules once daily (for neuropathy).   Quantity:  60 capsule   Refills:  2         Stop taking these medicines if you haven't already. Please contact your care team if you have questions.     gabapentin 100 MG capsule   Commonly known as:  NEURONTIN   Stopped by:  Karson Bishop MD                Where to get your medicines      These medications were sent to [id number] - [place], [place] - [address]  [address] [facility] [address]     Phone:  [phone]     DULoxetine 30 MG EC capsule                Primary Care Provider Office Phone # Fax #    Karson Bishop [phone]       [facility] [place] [address]  [facility] [address]        Equal " Access to Services     [facility]: Hadii aad ku hadwillcornelia Hany, wamaryluda luqadaha, qaybta kaalmaangeles cummings, jf marinelli. So [facility] [phone].    ATENCIÓN: Si habla español, tiene a randle disposición servicios gratuitos de asistencia lingüística. Llame al [phone].    We comply with applicable federal civil rights laws and [place] laws. We do not discriminate on the basis of race, color, national origin, age, disability, sex, sexual orientation, or gender identity.            Thank you!     Thank you for choosing [facility]  for your care. Our goal is always to provide you with excellent care. Hearing back from our patients is one way we can continue to improve our services. Please take a few minutes to complete the written survey that you may receive in the mail after your visit with us. Thank you!             Your Updated Medication List - Protect others around you: Learn how to safely use, store and throw away your medicines at www.disposemymeds.org.          This list is accurate as of 3/19/18  3:10 PM.  Always use your most recent med list.                   Brand Name Dispense Instructions for use Diagnosis    albuterol 108 (90 BASE) MCG/ACT Inhaler    PROAIR HFA/PROVENTIL HFA/VENTOLIN HFA    1 Inhaler    Inhale 1-2 puffs into the lungs every 6 hours as needed for shortness of breath / dyspnea or wheezing    Upper respiratory tract infection, unspecified type       atorvastatin 10 MG tablet    LIPITOR    90 tablet    Take 1 tablet (10 mg) by mouth daily    Mixed hyperlipidemia       beclomethasone 40 MCG/ACT Inhaler    QVAR    1 Inhaler    Inhale 2 puffs into the lungs 2 times daily    Upper respiratory tract infection, unspecified type       Chlorpheniramine-DM 4-30 MG Tabs     56 tablet    Take 1 tablet by mouth every 6 hours as needed COUGH    Pneumonia due to infectious organism, unspecified laterality, unspecified part of lung       DULoxetine 30 MG EC  capsule    CYMBALTA    60 capsule    One capsule once daily for one week then increase to two capsules once daily (for neuropathy).    Neuropathy       hydrOXYzine 25 MG tablet    ATARAX    120 tablet    Take 1 to 2 tablets (25-50 mg) by mouth every 6 hours as needed for itching    Chronic pruritus       metoprolol succinate 100 MG 24 hr tablet    TOPROL-XL    90 tablet    Take 1 tablet (100 mg) by mouth daily    Benign essential hypertension       omeprazole 20 MG CR capsule    priLOSEC    180 capsule    TAKE TWO CAPSULES BY MOUTH DAILY    Gastrointestinal hemorrhage with melena       warfarin 5 MG tablet    COUMADIN    140 tablet    TAKE 1 TO 1.5 TABLETS (5 TO 7.5 MG) BY MOUTH DAILY. TAKE AS PRESCRIBED BY INR CLINIC. CURRENT DOSE IS 7.5 MG FOR 2 DAYS AND 5 MG FOR 5 DAYS.    Paroxysmal atrial fibrillation (H)

## 2018-03-19 NOTE — TELEPHONE ENCOUNTER
Patient needs a follow up with PCP after starting Gabapentin.  Previously scheduled next week. Rescheduled to this afternoon with PCP due to feel swelling.  Analisa Vigil RN

## 2018-03-19 NOTE — TELEPHONE ENCOUNTER
Reason for call:  Patient reporting a symptom    Symptom or request: Swollen Feet (both), not hot to the touch    Duration (how long have symptoms been present): started Wed 3/14    Have you been treated for this before? No    Additional comments: call to discuss    Phone Number patient can be reached at:  Home number on file [phone] (home)    Best Time:  anytime    Can we leave a detailed message on this number:  YES    Call taken on 3/19/2018 at 9:21 AM by Prakash Jones

## 2018-03-26 ENCOUNTER — OFFICE VISIT (OUTPATIENT)
Dept: FAMILY MEDICINE | Facility: CLINIC | Age: 83
End: 2018-03-26
Payer: COMMERCIAL

## 2018-03-26 ENCOUNTER — TELEPHONE (OUTPATIENT)
Dept: FAMILY MEDICINE | Facility: CLINIC | Age: 83
End: 2018-03-26

## 2018-03-26 VITALS
BODY MASS INDEX: 26.92 KG/M2 | HEART RATE: 70 BPM | WEIGHT: 188 LBS | OXYGEN SATURATION: 90 % | DIASTOLIC BLOOD PRESSURE: 62 MMHG | HEIGHT: 70 IN | SYSTOLIC BLOOD PRESSURE: 128 MMHG | TEMPERATURE: 98 F

## 2018-03-26 DIAGNOSIS — R06.00 DYSPNEA, UNSPECIFIED TYPE: Primary | ICD-10-CM

## 2018-03-26 DIAGNOSIS — M17.11 PRIMARY OSTEOARTHRITIS OF RIGHT KNEE: ICD-10-CM

## 2018-03-26 DIAGNOSIS — R60.9 EDEMA, UNSPECIFIED TYPE: ICD-10-CM

## 2018-03-26 DIAGNOSIS — J44.9 CHRONIC OBSTRUCTIVE PULMONARY DISEASE, UNSPECIFIED COPD TYPE (H): ICD-10-CM

## 2018-03-26 LAB
FEF 25/75: NORMAL
FEV-1: NORMAL
FEV1/FVC: NORMAL
FVC: NORMAL

## 2018-03-26 PROCEDURE — 94010 BREATHING CAPACITY TEST: CPT | Performed by: INTERNAL MEDICINE

## 2018-03-26 PROCEDURE — 20610 DRAIN/INJ JOINT/BURSA W/O US: CPT | Performed by: INTERNAL MEDICINE

## 2018-03-26 PROCEDURE — 99214 OFFICE O/P EST MOD 30 MIN: CPT | Mod: 25 | Performed by: INTERNAL MEDICINE

## 2018-03-26 RX ORDER — ALBUTEROL SULFATE 90 UG/1
1-2 AEROSOL, METERED RESPIRATORY (INHALATION) EVERY 6 HOURS PRN
Qty: 1 INHALER | Refills: 11 | Status: SHIPPED | OUTPATIENT
Start: 2018-03-26 | End: 2018-05-10

## 2018-03-26 RX ORDER — TIOTROPIUM BROMIDE 18 UG/1
18 CAPSULE ORAL; RESPIRATORY (INHALATION) DAILY
Qty: 90 CAPSULE | Refills: 3 | Status: SHIPPED | OUTPATIENT
Start: 2018-03-26 | End: 2018-05-10

## 2018-03-26 NOTE — TELEPHONE ENCOUNTER
Reason for call:  Patient reporting a symptom    Symptom or request: Feet are still so swollen he cant put his shoes on, should see doctor again has an INR appt on Thursday, 3/29    Duration (how long have symptoms been present): Weeks    Have you been treated for this before? Yes    Additional comments: Feels he should see doctor again as swollen feet have not gone down.    Phone Number patient can be reached at:  Home number on file [phone] (home)    Best Time:  anytime    Can we leave a detailed message on this number:  NO    Call taken on 3/26/2018 at 9:37 AM by Mae Ha

## 2018-03-26 NOTE — TELEPHONE ENCOUNTER
Returned patient's call:     S: Increased swelling in BLE, F/U call from appointment last week    B: March 19th-Last OV     A:   Left foot > Right foot  Right leg> left leg   Denies pain at rest, discomfort with touch   C/o Shortness of Breath, especially with exertion. O2 level- 89%-91%   Denies orthopnea  Denies CP      BP yesterday- 101/39, , then 95/45  BP Today- 120/56, HR 72    Takes 20mg Lasix once/day- This is not active on his medication list. He had left overs from a previous prescription     Appetite is poor. Pt states he has cut back on salt.     R: No available appointments today. ED/Urgent Care with O2 levels?

## 2018-03-26 NOTE — PROGRESS NOTES
"  SUBJECTIVE:   Hermilo Velasquez is a 85 year old male who presents to clinic today for the following health issues:                  {additional problems for provider to add:202466}    Problem list and histories reviewed & adjusted, as indicated.  Additional history: {NONE - AS DOCUMENTED:294422::\"as documented\"}    {HIST REVIEW/ LINKS 2:566726}    Reviewed and updated as needed this visit by clinical staff       Reviewed and updated as needed this visit by Provider         {PROVIDER CHARTING PREFERENCE:430884}    "

## 2018-03-26 NOTE — MR AVS SNAPSHOT
After Visit Summary   3/26/2018    Hermilo Velasquez    MRN: [id number]           Patient Information     Date Of Birth          11/3/1932        Visit Information        Provider Department      3/26/2018 4:45 PM Karson Bishop MD [facility]        Today's Diagnoses     Dyspnea, unspecified type    -  1    Edema, unspecified type        Chronic obstructive pulmonary disease, unspecified COPD type (H)        Primary osteoarthritis of right knee           Follow-ups after your visit        Follow-up notes from your care team     Return in about 4 weeks (around 4/23/2018).      Your next 10 appointments already scheduled     Mar 29, 2018  1:30 PM CDT   Anticoagulation [facility] with [facility]   [facility]    [address]  [place] [address] [phone]              Future tests that were ordered for you today     Open Future Orders        Priority Expected Expires Ordered    **Hemoglobin FUTURE anytime Routine 3/26/2018 3/26/2019 3/26/2018    General PFT Lab (Please always keep checked) Routine  3/26/2019 3/26/2018    Pulmonary Function Test Routine  3/26/2019 3/26/2018            Who to contact     If you have questions or need follow up information about today's clinic visit or your schedule please contact [facility] directly at [phone].  Normal or non-critical lab and imaging results will be communicated to you by MyChart, letter or phone within 4 business days after the clinic has received the results. If you do not hear from us within 7 days, please contact the clinic through MyChart or phone. If you have a critical or abnormal lab result, we will notify you by phone as soon as possible.  Submit refill requests through appening or call your pharmacy and they will forward the refill request to us. Please allow 3 business days for your refill to be completed.          Additional Information About Your Visit       "  [employer] Information     [employer] lets you send messages to your doctor, view your test results, renew your prescriptions, schedule appointments and more. To sign up, go to [email or web address] . Click on \"Log in\" on the left side of the screen, which will take you to the Welcome page. Then click on \"Sign up Now\" on the right side of the page.     You will be asked to enter the access code listed below, as well as some personal information. Please follow the directions to create your username and password.     Your access code is: [email or web address]  Expires: 4/10/2018  1:04 PM     Your access code will  in 90 days. If you need help or a new code, please call your [place] clinic or [phone].        Care EveryWhere ID     This is your Care EveryWhere ID. This could be used by other organizations to access your [facility] medical records  [id number]        Your Vitals Were     Pulse Temperature Height Pulse Oximetry BMI (Body Mass Index)       70 98  F (36.7  C) (Oral) 5' 10\" (1.778 m) 90% 26.98 kg/m2        Blood Pressure from Last 3 Encounters:   18 128/62   18 123/59   02/15/18 104/62    Weight from Last 3 Encounters:   18 188 lb (85.3 kg)   18 190 lb (86.2 kg)   02/15/18 188 lb (85.3 kg)              We Performed the Following     Spirometry, Breathing Capacity: Normal Order, Clinic Performed          Today's Medication Changes          These changes are accurate as of 3/26/18 11:22 PM.  If you have any questions, ask your nurse or doctor.               Start taking these medicines.        Dose/Directions    tiotropium 18 MCG capsule   Commonly known as:  SPIRIVA HANDIHALER   Used for:  Chronic obstructive pulmonary disease, unspecified COPD type (H)   Started by:  Karson Bishop MD        Dose:  18 mcg   Inhale 1 capsule (18 mcg) into the lungs daily   Quantity:  90 capsule   Refills:  3            Where to get your medicines      These medications were sent to [id number] - [facility] [address]  [address] [facility] [address]     Phone:  [phone]     albuterol 108 (90 BASE) MCG/ACT Inhaler    tiotropium 18 MCG capsule                Primary Care Provider Office Phone # Fax #    Karson Bishop [phone]       [facility] [address]  [facility] [address]        Equal Access to Services     YUE CORDERO : Hadii aad ku hadasho Soomaali, waaxda luqadaha, qaybta kaalmada adeegyada, waxay idiin hayaan adeeg kharash la'aan . So [facility] [phone].    ATENCIÓN: Si habla español, tiene a randle disposición servicios gratuitos de asistencia lingüística. [facility] [phone].    We comply with applicable federal civil rights laws and [place] laws. We do not discriminate on the basis of race, color, national origin, age, disability, sex, sexual orientation, or gender identity.            Thank you!     Thank you for choosing [facility]  for your care. Our goal is always to provide you with excellent care. Hearing back from our patients is one way we can continue to improve our services. Please take a few minutes to complete the written survey that you may receive in the mail after your visit with us. Thank you!             Your Updated Medication List - Protect others around you: Learn how to safely use, store and throw away your medicines at www.disposemymeds.org.          This list is accurate as of 3/26/18 11:22 PM.  Always use your most recent med list.                   Brand Name Dispense Instructions for use Diagnosis    albuterol 108 (90 BASE) MCG/ACT Inhaler    PROAIR HFA/PROVENTIL HFA/VENTOLIN HFA    1 Inhaler    Inhale 1-2 puffs into the lungs every 6 hours as needed for shortness of breath / dyspnea or wheezing        atorvastatin 10 MG tablet    LIPITOR    90 tablet    Take 1 tablet (10 mg) by mouth daily    Mixed hyperlipidemia       beclomethasone 40 MCG/ACT Inhaler    QVAR    1 Inhaler     Inhale 2 puffs into the lungs 2 times daily    Upper respiratory tract infection, unspecified type       Chlorpheniramine-DM 4-30 MG Tabs     56 tablet    Take 1 tablet by mouth every 6 hours as needed COUGH    Pneumonia due to infectious organism, unspecified laterality, unspecified part of lung       DULoxetine 30 MG EC capsule    CYMBALTA    60 capsule    One capsule once daily for one week then increase to two capsules once daily (for neuropathy).    Neuropathy       hydrOXYzine 25 MG tablet    ATARAX    120 tablet    Take 1 to 2 tablets (25-50 mg) by mouth every 6 hours as needed for itching    Chronic pruritus       metoprolol succinate 100 MG 24 hr tablet    TOPROL-XL    90 tablet    Take 1 tablet (100 mg) by mouth daily    Benign essential hypertension       omeprazole 20 MG CR capsule    priLOSEC    180 capsule    TAKE TWO CAPSULES BY MOUTH DAILY    Gastrointestinal hemorrhage with melena       tiotropium 18 MCG capsule    SPIRIVA HANDIHALER    90 capsule    Inhale 1 capsule (18 mcg) into the lungs daily    Chronic obstructive pulmonary disease, unspecified COPD type (H)       warfarin 5 MG tablet    COUMADIN    140 tablet    TAKE 1 TO 1.5 TABLETS (5 TO 7.5 MG) BY MOUTH DAILY. TAKE AS PRESCRIBED BY INR CLINIC. CURRENT DOSE IS 7.5 MG FOR 2 DAYS AND 5 MG FOR 5 DAYS.    Paroxysmal atrial fibrillation (H)

## 2018-03-26 NOTE — LETTER
My COPD Action Plan     Name: Hermilo Velasquez    YOB: 1932   Date: 3/26/2018    My doctor: Karson Bishop MD   My clinic: [address] [address] [phone]  My Controller Medicine: Tiotropium (Spiriva)   Dose: 1 puff once daily EVERY DAY      My Rescue Medicine: Albuterol (Proair/Ventolin/Proventil) inhaler   Dose: 1-2 puffs up to every 4 hours as needed for breathlessness, coughing       My COPD Severity: Severe = FeV1 < 30%-49%      Use of Oxygen: Oxygen Not Prescribed      Make sure you've had your pneumonia   vaccines.          GREEN ZONE       Doing well today      Usual level of activity and exercise    Usual amount of cough and mucus    No shortness of breath    Usual level of health (thinking clearly, sleeping well, feel like eating) Actions:      Take daily medicines    Use oxygen as prescribed    Follow regular exercise and diet plan    Avoid cigarette smoke and other irritants that harm the lungs           YELLOW ZONE          Having a bad day or flare up      Short of breath more than usual    A lot more sputum (mucus) than usual    Sputum looks yellow, green, tan, brown or bloody    More coughing or wheezing    Fever or chills    Less energy; trouble completing activities    Trouble thinking or focusing    Using quick relief inhaler or nebulizer more often    Poor sleep; symptoms wake me up    Do not feel like eating Actions:      Get plenty of rest    Take daily medicines    Use quick relief inhaler every 4 hours    If you use oxygen, call you doctor to see if you should adjust your oxygen    Do breathing exercises or other things to help you relax    Let a loved one, friend or neighbor know you are feeling worse    Call your care team if you have 2 or more symptoms.  Start taking steroids or antibiotics if directed by your care team           RED ZONE       Need medical care now      Severe shortness of breath (feel you can't  breathe)    Fever, chills    Not enough breath to do any activity    Trouble coughing up mucus, walking or talking    Blood in mucus    Frequent coughing   Rescue medicines are not working    Not able to sleep because of breathing    Feel confused or drowsy    Chest pain    Actions:      Call your health care team.  If you cannot reach your care team, call 911 or go to the emergency room.        Annual Reminders:  Meet with Care Team, Flu Shot every Fall  Pharmacy:    [id number] - [place], [place] - [address] [id number] - [facility] [address] [facility]

## 2018-03-26 NOTE — PROGRESS NOTES
"    SUBJECTIVE:                                                    Hermilo Velasquez is a 85 year old male who presents to clinic today for the following health issues:  {Provider please address medication reconciliation discrepancies--rooming staff please delete if no med/rec issues}    {Superlists:305942}    {additional problems for provider to add:440850}    Problem list and histories reviewed & adjusted, as indicated.  Additional history: {NONE - AS DOCUMENTED:269320::\"as documented\"}    {HIST REVIEW/ LINKS 2:337188}    {PROVIDER CHARTING PREFERENCE:208535}      "

## 2018-03-26 NOTE — PROGRESS NOTES
"  SUBJECTIVE:   Hermilo Velasquez is a 85 year old male who presents to clinic today for the following health issues:      Chief Complaint   Patient presents with     Follow Up For     Edema, unspecified type      His edema has not completely resolved from last visit but it has improved  He complained of dyspnea to triage RN  Home sats have been low  With further questioning of patient, dyspnea has been improving since pneumonia in Feb steadily  No current cough or fever  No orthopnea, PND  He stopped using the compression wraps, he is trying to keep his legs elevated  He took several doses of furosemide   He complains of severe right knee pain present for years  His pain has been controlled with knee joint injections   His last injection was about a year ago by his orthopedic surgeon  He requests a knee injection today because his last injection has \"about worn off\" and the knee feels very painful and unstable   He tells me that he has already tried icing his knee and using APAP, he does not want to wait to return to his orthopedist and he demands and injection today   He has a > 100 pack year smoking hx     Problem list and histories reviewed & adjusted, as indicated.  Additional history: as documented    Patient Active Problem List   Diagnosis     Low back pain     Ventral hernia     Nonrheumatic aortic valve stenosis     GERD (gastroesophageal reflux disease)     Non Hodgkin's lymphoma (H): 2013     Microscopic hematuria     Health Care Home     CARDIOVASCULAR SCREENING; LDL GOAL LESS THAN 130     History of colonic polyps     Neuropathy     Anemia due to blood loss, acute     Physical deconditioning     Paroxysmal atrial fibrillation (H)     Need for SBE (subacute bacterial endocarditis) prophylaxis     RBBB (right bundle branch block)     Gastrointestinal hemorrhage with melena     Primary osteoarthritis of both knees     Primary osteoarthritis of left hip     Pulmonary embolism, bilateral (H)     S/P IVC " filter     Long-term (current) use of anticoagulants [Z79.01]     Benign neoplasm of colon, unspecified part of colon     Pulmonary nodules     Benign neoplasm of colon     Primary osteoarthritis of left knee     Essential hypertension with goal blood pressure less than 140/90     Non-Hodgkin's lymphoma, unspecified body region, unspecified non-Hodgkin lymphoma type (H)     Anticoagulated on Coumadin     Other chronic pulmonary embolism without acute cor pulmonale (H)     Epistaxis     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     Aftercare following left knee joint replacement surgery     Rash     Drainage from wound: left Knee     Lower extremity edema     Leg edema, left     S/P total knee arthroplasty     ACP (advance care planning)     Hyperlipidemia LDL goal <130     Chronic obstructive pulmonary disease, unspecified COPD type (H)     Past Surgical History:   Procedure Laterality Date     APPENDECTOMY       AS TOTAL KNEE ARTHROPLASTY       ESOPHAGOSCOPY, GASTROSCOPY, DUODENOSCOPY (EGD), COMBINED N/A 11/28/2015    Procedure: COMBINED ESOPHAGOSCOPY, GASTROSCOPY, DUODENOSCOPY (EGD);  Surgeon: Danis Castillo MD;  Location:  GI     HERNIA REPAIR  2006     HERNIORRHAPHY VENTRAL  4/17/2013    Procedure: HERNIORRHAPHY VENTRAL;  VENTRAL HERNIA REPAIR WITH MESH;  Surgeon: Patel Guzman MD;  Location:  OR     KNEE SURGERY      arthroscopic right knee surgery      REPAIR LIGAMENT ANKLE  2/23/2012    Procedure:REPAIR LIGAMENT ANKLE; LEFT TARSAL TUNNEL RELEASE OF KNOT OF ARIEL RELEASE; Surgeon:SAUL PUENTE; Location: OR     REPLACE VALVE AORTIC N/A 9/3/2015    Procedure: REPLACE VALVE AORTIC;  Surgeon: Antonino Mitchell MD;  Location:  OR     ROTATOR CUFF REPAIR RT/LT      bilateral     SPINE SURGERY      3 spine surgeries     TONSILLECTOMY       TURP         Social History   Substance Use Topics     Smoking status: Former Smoker     Packs/day: 2.00     Years: 55.00     Quit  date: 1/22/1998     Smokeless tobacco: Never Used     Alcohol use 0.0 oz/week     0 Standard drinks or equivalent per week      Comment: occassionaly     Family History   Problem Relation Age of Onset     C.A.D. Father      Emphysema Father          Current Outpatient Prescriptions   Medication Sig Dispense Refill     tiotropium (SPIRIVA HANDIHALER) 18 MCG capsule Inhale 1 capsule (18 mcg) into the lungs daily 90 capsule 3     albuterol (PROAIR HFA/PROVENTIL HFA/VENTOLIN HFA) 108 (90 BASE) MCG/ACT Inhaler Inhale 1-2 puffs into the lungs every 6 hours as needed for shortness of breath / dyspnea or wheezing 1 Inhaler 11     DULoxetine (CYMBALTA) 30 MG EC capsule One capsule once daily for one week then increase to two capsules once daily (for neuropathy). 60 capsule 2     Chlorpheniramine-DM 4-30 MG TABS Take 1 tablet by mouth every 6 hours as needed COUGH 56 tablet 3     atorvastatin (LIPITOR) 10 MG tablet Take 1 tablet (10 mg) by mouth daily 90 tablet 3     beclomethasone (QVAR) 40 MCG/ACT Inhaler Inhale 2 puffs into the lungs 2 times daily 1 Inhaler 3     hydrOXYzine (ATARAX) 25 MG tablet Take 1 to 2 tablets (25-50 mg) by mouth every 6 hours as needed for itching 120 tablet 2     metoprolol (TOPROL-XL) 100 MG 24 hr tablet Take 1 tablet (100 mg) by mouth daily 90 tablet 3     warfarin (COUMADIN) 5 MG tablet TAKE 1 TO 1.5 TABLETS (5 TO 7.5 MG) BY MOUTH DAILY. TAKE AS PRESCRIBED BY INR CLINIC. CURRENT DOSE IS 7.5 MG FOR 2 DAYS AND 5 MG FOR 5 DAYS. 140 tablet 0     omeprazole (PRILOSEC) 20 MG CR capsule TAKE TWO CAPSULES BY MOUTH DAILY  180 capsule 2     [DISCONTINUED] albuterol (PROAIR HFA/PROVENTIL HFA/VENTOLIN HFA) 108 (90 BASE) MCG/ACT Inhaler Inhale 1-2 puffs into the lungs every 6 hours as needed for shortness of breath / dyspnea or wheezing 1 Inhaler 3     Allergies   Allergen Reactions     Gabapentin      Swelling       Lidocaine Blisters     Allergy to lidocaine ointment     Lasix [Furosemide] Rash      "Penicillins Rash     \"broke out from injection\" 60 yrs ago         Reviewed and updated as needed this visit by clinical staff       Reviewed and updated as needed this visit by Provider         ROS:  Constitutional, HEENT, cardiovascular, pulmonary, gi and gu systems are negative, except as otherwise noted.    OBJECTIVE:     /62 (BP Location: Right arm, Cuff Size: Adult Regular)  Pulse 70  Temp 98  F (36.7  C) (Oral)  Ht 5' 10\" (1.778 m)  Wt 188 lb (85.3 kg)  SpO2 90%  BMI 26.98 kg/m2  Body mass index is 26.98 kg/(m^2).     O2 sat drops to 87% with walking, improved to 90% with rest   GENERAL: healthy, alert and no distress  NECK: no adenopathy, no asymmetry, masses, or scars and thyroid normal to palpation  RESP: lungs clear to auscultation - no rales, rhonchi or wheezes  CV: Heart with regular rate and rhythm.   ABDOMEN: soft, nontender, no hepatosplenomegaly, no masses and bowel sounds normal  MS: Improving now very mild edema in both distal lower extremities; right knee joint line tenderness noted, no effusion, full ROM, ligament testing intact   NEURO: Normal strength and tone, mentation intact and speech normal  PSYCH: mentation appears normal, affect normal/bright    Diagnostic Test Results:  Spirometry shows severe obstructive lung disease     ASSESSMENT/PLAN:     1. Dyspnea, unspecified type  He needs a follow up hemoglobin to exclude anemia as cause of dyspnea  He did not get it drawn today  Hopefully we can pick that up at a future visit  His dyspnea is most likely from severe COPD which was diagnosed today  Start tiotropium  I recommended home oxygent based on his low sats with ambulation, he refused, understanding the risks that I explained to him   Hopefully as he gets some distance on his recent URI, his oxygenation improves, the spiriva might help too  Short term follow up in 4 week to reassess oxygen sats with ambulation  - General PFT Lab (Please always keep checked); Future  - " Pulmonary Function Test; Future  - Spirometry, Breathing Capacity: Normal Order, Clinic Performed  - **Hemoglobin FUTURE anytime; Future    2. Edema, unspecified type  I don't think this is from heart failure  This is from the gabapentin and seems to be improving now that he is off that drug  He does not need to keep taking furosemide  He was counseled to keep his legs elevated and watch diet salt and use compression hosiery     3. Chronic obstructive pulmonary disease, unspecified COPD type (H)    - tiotropium (SPIRIVA HANDIHALER) 18 MCG capsule; Inhale 1 capsule (18 mcg) into the lungs daily  Dispense: 90 capsule; Refill: 3    4. Primary osteoarthritis of right knee    Right knee joint cortisone injection  After discussion of risks and benefits of the procedure including bleeding and infection, verbal informed consent was obtained.  Area prepped and draped in sterile fashion.  Local anesthesia was obtained with 1% lidocaine.   The suprapatellar pouch was entered using a lateral approach.  1cc of K40 was injected into the joint space.  The procedure was tolerated well and after care was discussed.          FUTURE APPOINTMENTS:       - Follow-up visit in 4 weeks or sooner as needed     Karson Bishop MD  [facility]

## 2018-03-26 NOTE — NURSING NOTE
"Chief Complaint   Patient presents with     Follow Up For     Edema, unspecified type        Initial /62 (BP Location: Right arm, Cuff Size: Adult Regular)  Pulse 70  Temp 98  F (36.7  C) (Oral)  Ht 5' 10\" (1.778 m)  Wt 188 lb (85.3 kg)  SpO2 90%  BMI 26.98 kg/m2 Estimated body mass index is 26.98 kg/(m^2) as calculated from the following:    Height as of this encounter: 5' 10\" (1.778 m).    Weight as of this encounter: 188 lb (85.3 kg).  Medication Reconciliation: complete    "

## 2018-03-26 NOTE — TELEPHONE ENCOUNTER
Huddled with provider: double book today at 1645. Pt will check in at 1630.     Called patient and he plans to attend appointment.     Chrissy ALICEA RN

## 2018-03-27 ENCOUNTER — TRANSFERRED RECORDS (OUTPATIENT)
Dept: HEALTH INFORMATION MANAGEMENT | Facility: CLINIC | Age: 83
End: 2018-03-27

## 2018-03-29 ENCOUNTER — ANTICOAGULATION THERAPY VISIT (OUTPATIENT)
Dept: NURSING | Facility: CLINIC | Age: 83
End: 2018-03-29
Payer: COMMERCIAL

## 2018-03-29 DIAGNOSIS — I48.0 PAROXYSMAL ATRIAL FIBRILLATION (H): ICD-10-CM

## 2018-03-29 DIAGNOSIS — I26.99 PULMONARY EMBOLISM, BILATERAL (H): ICD-10-CM

## 2018-03-29 DIAGNOSIS — Z79.01 LONG-TERM (CURRENT) USE OF ANTICOAGULANTS: ICD-10-CM

## 2018-03-29 LAB — INR POINT OF CARE: 3.4 (ref 0.86–1.14)

## 2018-03-29 PROCEDURE — 36416 COLLJ CAPILLARY BLOOD SPEC: CPT

## 2018-03-29 PROCEDURE — 99207 ZZC NO CHARGE NURSE ONLY: CPT

## 2018-03-29 PROCEDURE — 85610 PROTHROMBIN TIME: CPT | Mod: QW

## 2018-03-29 NOTE — PROGRESS NOTES
ANTICOAGULATION FOLLOW-UP CLINIC VISIT    Patient Name:  Hermilo Velasquez  Date:  3/29/2018  Contact Type:  Face to Face    SUBJECTIVE:     Patient Findings     Comments Recent Rx abx and Prednisone.  Finished meds.             OBJECTIVE    INR Protime   Date Value Ref Range Status   03/29/2018 3.4 (A) 0.86 - 1.14 Final       ASSESSMENT / PLAN  INR assessment SUPRA    Recheck INR In: 4 WEEKS    INR Location Clinic      Anticoagulation Summary as of 3/29/2018     INR goal 2.0-3.0   Today's INR 3.4!   Maintenance plan 5 mg (5 mg x 1) every day   Full instructions 3/29: 2.5 mg; Otherwise 5 mg every day   Weekly total 35 mg   Weekly max dose 45 mg   Plan last modified Janneth Allen RN (8/22/2017)   Next INR check 4/25/2018   Target end date Indefinite    Indications   Long-term (current) use of anticoagulants [Z79.01] [Z79.01]  Pulmonary embolism  bilateral (H) [I26.99]  Paroxysmal atrial fibrillation (H) [I48.0]         Anticoagulation Episode Summary     INR check location [facility]    Preferred lab     Send INR reminders to  ANTICOAGULATION    Comments       Anticoagulation Care Providers     Provider Role Specialty Phone number    Bucky Boone MD Responsible Internal Medicine [phone]            See the Encounter Report to view Anticoagulation Flowsheet and Dosing Calendar (Go to Encounters tab in chart review, and find the Anticoagulation Therapy Visit)    Dosage adjustment made based on physician directed care plan.    Evie Mckeon RN

## 2018-03-29 NOTE — MR AVS SNAPSHOT
Hermilo MADRID Eva   3/29/2018 1:30 PM   Anticoagulation Therapy Visit    Description:  85 year old male   Provider:   ANTICOAGULATION CLINIC   Department:  Cs Nurse           INR as of 3/29/2018     Today's INR 3.4!      Anticoagulation Summary as of 3/29/2018     INR goal 2.0-3.0   Today's INR 3.4!   Full instructions 3/29: 2.5 mg; Otherwise 5 mg every day   Next INR check 4/25/2018    Indications   Long-term (current) use of anticoagulants [Z79.01] [Z79.01]  Pulmonary embolism  bilateral (H) [I26.99]  Paroxysmal atrial fibrillation (H) [I48.0]         Your next Anticoagulation Clinic appointment(s)     Apr 25, 2018 11:30 AM CDT   Anticoagulation Visit with  ANTICOAGULATION CLINIC   [facility] ([facility]    [address]  [place] [address]   [phone]              Contact Numbers     Clinic Number:         March 2018 Details    Sun Mon Tue Wed Thu Fri Sat         1               2               3                 4               5               6               7               8               9               10                 11               12               13               14               15               16               17                 18               19               20               21               22               23               24                 25               26               27               28               29      2.5 mg   See details      30      5 mg         31      5 mg          Date Details   03/29 This INR check               How to take your warfarin dose     To take:  2.5 mg Take 0.5 of a 5 mg tablet.    To take:  5 mg Take 1 of the 5 mg tablets.           April 2018 Details    Sun Mon Tue Wed Thu Fri Sat     1      5 mg         2      5 mg         3      5 mg         4      5 mg         5      5 mg         6      5 mg         7      5 mg           8      5 mg         9      5 mg         10      5 mg         11      5 mg         12      5 mg          13      5 mg         14      5 mg           15      5 mg         16      5 mg         17      5 mg         18      5 mg         19      5 mg         20      5 mg         21      5 mg           22      5 mg         23      5 mg         24      5 mg         25            26               27               28                 29               30                     Date Details   No additional details    Date of next INR:  4/25/2018         How to take your warfarin dose     To take:  5 mg Take 1 of the 5 mg tablets.

## 2018-04-18 ENCOUNTER — APPOINTMENT (OUTPATIENT)
Age: 83
Setting detail: DERMATOLOGY
End: 2018-04-19

## 2018-04-18 ENCOUNTER — TRANSFERRED RECORDS (OUTPATIENT)
Dept: HEALTH INFORMATION MANAGEMENT | Facility: CLINIC | Age: 83
End: 2018-04-18

## 2018-04-18 DIAGNOSIS — L27.0 GENERALIZED SKIN ERUPTION DUE TO DRUGS AND MEDICAMENTS TAKEN INTERNALLY: ICD-10-CM

## 2018-04-18 PROCEDURE — 99213 OFFICE O/P EST LOW 20 MIN: CPT

## 2018-04-18 PROCEDURE — OTHER TREATMENT REGIMEN: OTHER

## 2018-04-18 PROCEDURE — OTHER PRESCRIPTION: OTHER

## 2018-04-18 PROCEDURE — OTHER MIPS QUALITY: OTHER

## 2018-04-18 PROCEDURE — OTHER COUNSELING: OTHER

## 2018-04-18 RX ORDER — TRIAMCINOLONE ACETONIDE 1 MG/G
0.1% CREAM TOPICAL BID
Qty: 1 | Refills: 3 | Status: ERX | COMMUNITY
Start: 2018-04-18

## 2018-04-18 ASSESSMENT — LOCATION SIMPLE DESCRIPTION DERM
LOCATION SIMPLE: LOWER BACK
LOCATION SIMPLE: RIGHT UPPER BACK
LOCATION SIMPLE: RIGHT UPPER ARM
LOCATION SIMPLE: LEFT UPPER ARM

## 2018-04-18 ASSESSMENT — LOCATION ZONE DERM
LOCATION ZONE: TRUNK
LOCATION ZONE: ARM

## 2018-04-18 ASSESSMENT — LOCATION DETAILED DESCRIPTION DERM
LOCATION DETAILED: RIGHT PROXIMAL POSTERIOR UPPER ARM
LOCATION DETAILED: SUPERIOR LUMBAR SPINE
LOCATION DETAILED: LEFT PROXIMAL POSTERIOR UPPER ARM
LOCATION DETAILED: RIGHT SUPERIOR MEDIAL UPPER BACK

## 2018-04-18 NOTE — PROCEDURE: TREATMENT REGIMEN
Plan: Recheck area in 6-8 weeks; patient was instructed to bring all medications to future appointments
Discontinue Regimen: Omeprazole
Initiate Treatment: QAM:\\nOTC Claritin- one tablet \\nOTC Zantac- one tablet \\nApply Triamcinolone cream to back and flanks \\n\\nQHS:\\nHydroxazine or Benadryl \\nApply Triamcinolone cream to back and flanks
Continue Regimen: Triamcinolone cream 0.1% cream BID to rash
Detail Level: Simple

## 2018-04-18 NOTE — PROCEDURE: MIPS QUALITY
Detail Level: Detailed
Quality 110: Preventive Care And Screening: Influenza Immunization: Influenza Immunization previously received during influenza season
Quality 226: Preventive Care And Screening: Tobacco Use: Screening And Cessation Intervention: Patient screened for tobacco and is an ex-smoker
Quality 130: Documentation Of Current Medications In The Medical Record: Current Medications Documented
Quality 431: Preventive Care And Screening: Unhealthy Alcohol Use - Screening: Patient screened for unhealthy alcohol use using a single question and scores less than 2 times per year

## 2018-04-25 ENCOUNTER — ANTICOAGULATION THERAPY VISIT (OUTPATIENT)
Dept: NURSING | Facility: CLINIC | Age: 83
End: 2018-04-25
Payer: COMMERCIAL

## 2018-04-25 DIAGNOSIS — Z79.01 LONG-TERM (CURRENT) USE OF ANTICOAGULANTS: ICD-10-CM

## 2018-04-25 DIAGNOSIS — I48.0 PAROXYSMAL ATRIAL FIBRILLATION (H): ICD-10-CM

## 2018-04-25 DIAGNOSIS — I26.99 PULMONARY EMBOLISM, BILATERAL (H): ICD-10-CM

## 2018-04-25 LAB — INR POINT OF CARE: 3 (ref 0.86–1.14)

## 2018-04-25 PROCEDURE — 85610 PROTHROMBIN TIME: CPT | Mod: QW

## 2018-04-25 PROCEDURE — 36416 COLLJ CAPILLARY BLOOD SPEC: CPT

## 2018-04-25 PROCEDURE — 99207 ZZC NO CHARGE NURSE ONLY: CPT

## 2018-04-25 NOTE — MR AVS SNAPSHOT
Hermilo MADRID Eva   4/25/2018 11:30 AM   Anticoagulation Therapy Visit    Description:  85 year old male   Provider:   ANTICOAGULATION CLINIC   Department:   Nurse           INR as of 4/25/2018     Today's INR 3.0      Anticoagulation Summary as of 4/25/2018     INR goal 2.0-3.0   Today's INR 3.0   Full instructions 5 mg every day   Next INR check 6/6/2018    Indications   Long-term (current) use of anticoagulants [Z79.01] [Z79.01]  Pulmonary embolism  bilateral (H) [I26.99]  Paroxysmal atrial fibrillation (H) [I48.0]         Your next Anticoagulation Clinic appointment(s)     Jun 06, 2018 11:15 AM CDT   Anticoagulation Visit with  ANTICOAGULATION CLINIC   [facility] ([facility])    [address]  [place] [address]   [phone]              Contact Numbers     Clinic Number:         April 2018 Details    Sun Mon Tue Wed Thu Fri Sat     1               2               3               4               5               6               7                 8               9               10               11               12               13               14                 15               16               17               18               19               20               21                 22               23               24               25      5 mg   See details      26      5 mg         27      5 mg         28      5 mg           29      5 mg         30      5 mg               Date Details   04/25 This INR check               How to take your warfarin dose     To take:  5 mg Take 1 of the 5 mg tablets.           May 2018 Details    Sun Mon Tue Wed Thu Fri Sat       1      5 mg         2      5 mg         3      5 mg         4      5 mg         5      5 mg           6      5 mg         7      5 mg         8      5 mg         9      5 mg         10      5 mg         11      5 mg         12      5 mg           13      5 mg         14      5 mg         15      5 mg         16       5 mg         17      5 mg         18      5 mg         19      5 mg           20      5 mg         21      5 mg         22      5 mg         23      5 mg         24      5 mg         25      5 mg         26      5 mg           27      5 mg         28      5 mg         29      5 mg         30      5 mg         31      5 mg            Date Details   No additional details            How to take your warfarin dose     To take:  5 mg Take 1 of the 5 mg tablets.           June 2018 Details    Sun Mon Tue Wed Thu Fri Sat          1      5 mg         2      5 mg           3      5 mg         4      5 mg         5      5 mg         6            7               8               9                 10               11               12               13               14               15               16                 17               18               19               20               21               22               23                 24               25               26               27               28               29               30                Date Details   No additional details    Date of next INR:  6/6/2018         How to take your warfarin dose     To take:  5 mg Take 1 of the 5 mg tablets.

## 2018-04-25 NOTE — PROGRESS NOTES
ANTICOAGULATION FOLLOW-UP CLINIC VISIT    Patient Name:  Hermilo Velasquez  Date:  4/25/2018  Contact Type:  Face to Face    SUBJECTIVE:     Patient Findings     Positives No Problem Findings           OBJECTIVE    INR Protime   Date Value Ref Range Status   04/25/2018 3.0 (A) 0.86 - 1.14 Final       ASSESSMENT / PLAN  INR assessment THER    Recheck INR In: 6 WEEKS    INR Location Clinic      Anticoagulation Summary as of 4/25/2018     INR goal 2.0-3.0   Today's INR 3.0   Maintenance plan 5 mg (5 mg x 1) every day   Full instructions 5 mg every day   Weekly total 35 mg   Weekly max dose 45 mg   No change documented Lin Hood RN   Plan last modified Janneth Allen RN (8/22/2017)   Next INR check 6/6/2018   Target end date Indefinite    Indications   Long-term (current) use of anticoagulants [Z79.01] [Z79.01]  Pulmonary embolism  bilateral (H) [I26.99]  Paroxysmal atrial fibrillation (H) [I48.0]         Anticoagulation Episode Summary     INR check location [facility]    Preferred lab     Send INR reminders to CS ANTICOAGULATION    Comments       Anticoagulation Care Providers     Provider Role Specialty Phone number    Karson Bishop MD Responsible Internal Medicine [phone]            See the Encounter Report to view Anticoagulation Flowsheet and Dosing Calendar (Go to Encounters tab in chart review, and find the Anticoagulation Therapy Visit)    Dosage adjustment made based on physician directed care plan.    Lin Hood, CECILIA

## 2018-05-01 RX ORDER — LIDOCAINE HYDROCHLORIDE 10 MG/ML
1 INJECTION, SOLUTION INFILTRATION; PERINEURAL ONCE
Qty: 1 ML | Refills: 0 | OUTPATIENT
Start: 2018-05-01 | End: 2018-05-01

## 2018-05-01 RX ORDER — TRIAMCINOLONE ACETONIDE 40 MG/ML
40 INJECTION, SUSPENSION INTRA-ARTICULAR; INTRAMUSCULAR ONCE
Qty: 1 ML | Refills: 0 | OUTPATIENT
Start: 2018-05-01 | End: 2018-05-01

## 2018-05-02 NOTE — TELEPHONE ENCOUNTER
Medication request for Hydroxyzine disp 180 for 90 days -    I don't see medication listed in chart as current or historical.    Please advise on medication request    Maile Ray, CMA    
OK to refill hydroxyzine     Bucky Boone MD   
Reason for Call:  Medication or medication refill:    Do you use a [facility]?  Name of the pharmacy and phone number for the current request:       [id number] - [place], [place] - [address]    Name of the medication requested: Hydroxyzine  25mg 1-2 QD  Patient would like Qty #180/90ds    Other request: Please call once approved    Can we leave a detailed message on this number? YES    Phone number patient can be reached at: Cell number on file:    Telephone Information:   Mobile [phone]       Best Time: Anytime (re-read this phone # back to him)    Call taken on 1/16/2017 at 10:16 AM by Prakash Teran  
yes

## 2018-05-09 DIAGNOSIS — J44.9 CHRONIC OBSTRUCTIVE PULMONARY DISEASE, UNSPECIFIED COPD TYPE (H): Primary | ICD-10-CM

## 2018-05-10 DIAGNOSIS — J44.9 CHRONIC OBSTRUCTIVE PULMONARY DISEASE, UNSPECIFIED COPD TYPE (H): ICD-10-CM

## 2018-05-10 RX ORDER — ALBUTEROL SULFATE 90 UG/1
1-2 AEROSOL, METERED RESPIRATORY (INHALATION) EVERY 6 HOURS PRN
Qty: 1 INHALER | Refills: 11 | Status: SHIPPED | OUTPATIENT
Start: 2018-05-10 | End: 2018-05-15

## 2018-05-10 RX ORDER — TIOTROPIUM BROMIDE 18 UG/1
18 CAPSULE ORAL; RESPIRATORY (INHALATION) DAILY
Qty: 90 CAPSULE | Refills: 3 | Status: SHIPPED | OUTPATIENT
Start: 2018-05-10 | End: 2020-09-18

## 2018-05-10 NOTE — TELEPHONE ENCOUNTER
Prescription approved per [facility] Refill Protocol.  Janneth Allen RN

## 2018-05-10 NOTE — TELEPHONE ENCOUNTER
"Requested Prescriptions   Pending Prescriptions Disp Refills     tiotropium (SPIRIVA HANDIHALER) 18 MCG capsule 90 capsule 3     Sig: Inhale 1 capsule (18 mcg) into the lungs daily    Asthma Maintenance Inhalers - Anticholinergics Passed    5/10/2018 12:29 PM       Passed - Patient is age 12 years or older       Passed - Recent (12 mo) or future (30 days) visit within the authorizing provider's specialty    Patient had office visit in the last 12 months or has a visit in the next 30 days with authorizing provider or within the authorizing provider's specialty.  See \"Patient Info\" tab in inbasket, or \"Choose Columns\" in Meds & Orders section of the refill encounter.            No flowsheet data found.    "

## 2018-05-10 NOTE — TELEPHONE ENCOUNTER
Patient would like sent to [facility]-was sent to [facility] in March  albuterol (PROAIR HFA/PROVENTIL HFA/VENTOLIN HFA) 108 (90 BASE) MCG/ACT Inhaler  Last Written Prescription Date:  3/26/18  Last Fill Quantity: 1 inh,  # refills: 11   Last office visit: 3/26/2018 with prescribing provider:  Edna Rios Office Visit:    Requested Prescriptions   Pending Prescriptions Disp Refills     albuterol (PROAIR HFA/PROVENTIL HFA/VENTOLIN HFA) 108 (90 Base) MCG/ACT Inhaler 1 Inhaler 11     Sig: Inhale 1-2 puffs into the lungs every 6 hours as needed for shortness of breath / dyspnea or wheezing    There is no refill protocol information for this order          Routing refill request to provider for review/approval because:  Drug not on the [facility], [facility] or [facility] refill protocol or controlled substance

## 2018-05-10 NOTE — TELEPHONE ENCOUNTER
Fax from [facility] requesting refill Spiriva Handihaler.      We had sent to local [facility]  In March with a year of refills          Disp Refills Start End CHRIS   tiotropium (SPIRIVA HANDIHALER) 18 MCG capsule 90 capsule 3 3/26/2018  --   Sig: Inhale 1 capsule (18 mcg) into the lungs daily   Class: E-Prescribe   Pharmacy   [id number] - [place], [place] - [address]

## 2018-05-15 ENCOUNTER — TELEPHONE (OUTPATIENT)
Dept: FAMILY MEDICINE | Facility: CLINIC | Age: 83
End: 2018-05-15

## 2018-05-15 DIAGNOSIS — J44.9 CHRONIC OBSTRUCTIVE PULMONARY DISEASE, UNSPECIFIED COPD TYPE (H): ICD-10-CM

## 2018-05-15 DIAGNOSIS — R21 RASH: Primary | ICD-10-CM

## 2018-05-15 RX ORDER — ALBUTEROL SULFATE 90 UG/1
1-2 AEROSOL, METERED RESPIRATORY (INHALATION) EVERY 6 HOURS PRN
Qty: 3 INHALER | Refills: 5 | Status: SHIPPED | OUTPATIENT
Start: 2018-05-15 | End: 2020-09-18

## 2018-05-15 NOTE — TELEPHONE ENCOUNTER
"Pt called back   Red blotches on back Itchy for 6 months   Saw dermatologists (Dr. Alexander, Dr. Forrester, Dr. Sinha) for this - unable to determine cause - tried changing medications. States he is no longer following up with them as \"they all tell me the same thing\"   Did not do any diet changes   On \"salve ointment\" - Triamcinolone 1%   Lately rash is sticking around more   Taking tablets for Hydroxyzine itch - causes sleepiness   States the only thing that was effective for this was prednisone     Pt is wondering if there is anything else he can try? Or someone else he should follow up with?     Please advise     Yamilex EVERETT RN    "

## 2018-05-15 NOTE — TELEPHONE ENCOUNTER
Patient called in stating that Spiriva inhaler and his albuterol inhaler needed prior auths. I will need to call the pharmacy to see what brand of albuterol inhaler is being dispensed. Often times insurance should be covering at least 1 of the three albuterol inhalers available.    As for the Spiriva, insurance usually seems to prefer the Incruse Ellipta for anticholinergic inhalers. I will check further with insurance on this as well.    Valentin Brian, CMA

## 2018-05-15 NOTE — TELEPHONE ENCOUNTER
After talking with insurance I found that the Spiriva Handihaler 18 mcg caps are covered by insurance and do not require a PA. In fact, they tell me that the mail order pharmacy processed a successful claim for this on 5/9/18 so a 90-day supply should be on its way to the patient.     The albuterol inhaler, however, is causing issues with insurance. Looks like the mail order pharmacy is trying to process the Proventil HFA inhaler and insurance does not cover this brand. Proair HFA is the preferred brand.    Discussed all of this with Hermilo so he knows.    Valentin Brian, [facility]

## 2018-05-15 NOTE — TELEPHONE ENCOUNTER
[facility] requires us to send another e-script in for the albuterol inhaler, specifying the Proair brand. I have this pending for signature to [facility].    Valentin Brian, CMA

## 2018-05-15 NOTE — TELEPHONE ENCOUNTER
Reason for Call:  Other call back    Detailed comments: pt called states [facility] is telling him he needs a Prior Auth for tiotropium (SPIRIVA HANDIHALER) 18 MCG capsule and albuterol (PROAIR HFA/PROVENTIL HFA/VENTOLIN HFA) 108 (90 Base) MCG/ACT Inhaler// He is also requesting to get back on Predisone for a rash that he is has had for 6 months    Phone Number Patient can be reached at: Home number on file [phone] (home)    Best Time: anytime    Can we leave a detailed message on this number? YES    Call taken on 5/15/2018 at 10:53 AM by Rosa M Lott

## 2018-05-16 NOTE — TELEPHONE ENCOUNTER
Called pt and discussed PCP's recommendation.     He inquired about an RX for prednisone, advised he call to make an appt with the Dermatologist to discuss per PCP's recommendation.     Pt agrees with plan,   Chrissy ALICEA RN

## 2018-05-16 NOTE — TELEPHONE ENCOUNTER
He could try the [facility] dermatology department  I sent a referral, they may be able to figure this out for him

## 2018-05-18 ENCOUNTER — OFFICE VISIT (OUTPATIENT)
Dept: DERMATOLOGY | Facility: CLINIC | Age: 83
End: 2018-05-18
Payer: COMMERCIAL

## 2018-05-18 VITALS — HEART RATE: 56 BPM | SYSTOLIC BLOOD PRESSURE: 131 MMHG | DIASTOLIC BLOOD PRESSURE: 69 MMHG

## 2018-05-18 DIAGNOSIS — L29.9 PRURITUS: Primary | ICD-10-CM

## 2018-05-18 RX ORDER — FEXOFENADINE HCL 60 MG/1
180 TABLET, FILM COATED ORAL DAILY
Qty: 60 TABLET | Refills: 1 | Status: SHIPPED | OUTPATIENT
Start: 2018-05-18 | End: 2018-07-23

## 2018-05-18 ASSESSMENT — PAIN SCALES - GENERAL: PAINLEVEL: NO PAIN (0)

## 2018-05-18 NOTE — MR AVS SNAPSHOT
After Visit Summary   5/18/2018    Hermilo Velasquez    MRN: [id number]           Patient Information     Date Of Birth          11/3/1932        Visit Information        Provider Department      5/18/2018 1:30 PM En Aviles MD [facility] Dermatology        Today's Diagnoses     Pruritus    -  1      Care Instructions    - Take fexofenadine in the morning  - Take hydoxyzine at night  - Use vanicream lotion twice a day  - Use vanicream soap  - Follow up in 1 month    For wife: minoxidil cream           Follow-ups after your visit        Follow-up notes from your care team     Return in about 4 weeks (around 6/15/2018).      Your next 10 appointments already scheduled     Jun 06, 2018 11:15 AM CDT   Anticoagulation Visit with  ANTICOAGULATION CLINIC   [facility] ([facility])    [address]  [facility] [address]   [phone]            Jun 18, 2018 11:30 AM CDT   (Arrive by 11:15 AM)   Return Visit with En Aviles MD   [facility] Dermatology ([facility])    [address] [place] [address] [phone]              Who to contact     Please call your clinic at [phone] to:    Ask questions about your health    Make or cancel appointments    Discuss your medicines    Learn about your test results    Speak to your doctor            Additional Information About Your Visit        MyChart Information     [employer] is an electronic gateway that provides easy, online access to your medical records. With [employer], you can request a clinic appointment, read your test results, renew a prescription or communicate with your care team.     To sign up for [employer] visit the website at [email or web address]   You will be asked to enter the access code listed below, as well as some personal information. Please follow the directions to create your username and password.     Your access code is: [email or web address]  Expires:  8/15/2018  6:31 AM     Your access code will  in 90 days. If you need help or a new code, please contact your [facility] or call [phone] for assistance.        Care EveryWhere ID     This is your Care EveryWhere ID. This could be used by other organizations to access your [facility] medical records  [id number]        Your Vitals Were     Pulse                   56            Blood Pressure from Last 3 Encounters:   18 131/69   18 128/62   18 123/59    Weight from Last 3 Encounters:   18 85.3 kg (188 lb)   18 86.2 kg (190 lb)   02/15/18 85.3 kg (188 lb)              Today, you had the following     No orders found for display       Primary Care Provider Office Phone # Fax #    Karson Bishop [phone] [address]  [place] [address]        Equal Access to Services     [facility]: Hadii aad ku hadasho Soomaali, waaxda luqadaha, qaybta kaalmada adeegyada, waxay idiin haytedn trae salazar . So [facility] [phone].    ATENCIÓN: Si habla español, tiene a randle disposición servicios gratuitos de asistencia lingüística. Llame al [phone].    We comply with applicable federal civil rights laws and [place] laws. We do not discriminate on the basis of race, color, national origin, age, disability, sex, sexual orientation, or gender identity.            Thank you!     Thank you for choosing [facility] DERMATOLOG[facility]  for your care. Our goal is always to provide you with excellent care. Hearing back from our patients is one way we can continue to improve our services. Please take a few minutes to complete the written survey that you may receive in the mail after your visit with us. Thank you!             Your Updated Medication List - Protect others around you: Learn how to safely use, store and throw away your medicines at www.disposemymeds.org.          This list is accurate as of 18  2:43 PM.  Always use  your most recent med list.                   Brand Name Dispense Instructions for use Diagnosis    albuterol 108 (90 Base) MCG/ACT Inhaler    PROAIR HFA/PROVENTIL HFA/VENTOLIN HFA    3 Inhaler    Inhale 1-2 puffs into the lungs every 6 hours as needed for shortness of breath / dyspnea or wheezing    Chronic obstructive pulmonary disease, unspecified COPD type (H)       atorvastatin 10 MG tablet    LIPITOR    90 tablet    Take 1 tablet (10 mg) by mouth daily    Mixed hyperlipidemia       beclomethasone 40 MCG/ACT Inhaler    QVAR    1 Inhaler    Inhale 2 puffs into the lungs 2 times daily    Upper respiratory tract infection, unspecified type       Chlorpheniramine-DM 4-30 MG Tabs     56 tablet    Take 1 tablet by mouth every 6 hours as needed COUGH    Pneumonia due to infectious organism, unspecified laterality, unspecified part of lung       DULoxetine 30 MG EC capsule    CYMBALTA    60 capsule    One capsule once daily for one week then increase to two capsules once daily (for neuropathy).    Neuropathy       hydrOXYzine 25 MG tablet    ATARAX    120 tablet    Take 1 to 2 tablets (25-50 mg) by mouth every 6 hours as needed for itching    Chronic pruritus       metoprolol succinate 100 MG 24 hr tablet    TOPROL-XL    90 tablet    Take 1 tablet (100 mg) by mouth daily    Benign essential hypertension       omeprazole 20 MG CR capsule    priLOSEC    180 capsule    TAKE TWO CAPSULES BY MOUTH DAILY    Gastrointestinal hemorrhage with melena       tiotropium 18 MCG capsule    SPIRIVA HANDIHALER    90 capsule    Inhale 1 capsule (18 mcg) into the lungs daily    Chronic obstructive pulmonary disease, unspecified COPD type (H)       warfarin 5 MG tablet    COUMADIN    140 tablet    TAKE 1 TO 1.5 TABLETS (5 TO 7.5 MG) BY MOUTH DAILY. TAKE AS PRESCRIBED BY INR CLINIC. CURRENT DOSE IS 7.5 MG FOR 2 DAYS AND 5 MG FOR 5 DAYS.    Paroxysmal atrial fibrillation (H)

## 2018-05-18 NOTE — NURSING NOTE
Dermatology Rooming Note    Hermilo Velasquez's goals for this visit include:   Chief Complaint   Patient presents with     Derm Problem     Hermilo is here to discuss a rash that is on his back. He states that he noticed it about 6 months ago.

## 2018-05-18 NOTE — LETTER
5/18/2018       RE: Hermilo Velasquez  [address]  [facility] [address]     Dear Colleague,    Thank you for referring your patient, Hermilo Velasquez, to the [facility] DERMATOLOGY at [facility]. Please see a copy of my visit note below.    [facility] Dermatology Note      Dermatology Problem List:  1. Pruritus    Encounter Date: May 18, 2018    CC:   Chief Complaint   Patient presents with     Derm Problem     Hermilo is here to discuss a rash that is on his back. He states that he noticed it about 6 months ago.     History of Present Illness:  Mr. Hermilo Velasquez is a 85 year old male who presents for evaluation of pruritus.  Patient says he has had this for 6 months.  No further chart review it appears that patient at one point was on mycophenolate and prednisone for diagnosis of bullous pemphigoid.  He says he has been tapered off of both of those medications as a dermatologist told him he didn't have BP.  He has seen at least 3 different dermatologists in the past and has had multiple skin biopsies but did not bring any record.    He says he is always itchy.  Itching worse after showers.     Extensive PMH and medication hx reviewed: prosthetic AVR, neuropathy, pAfib, hx of PE, on warfarin.     He has tried stopping soap, stopping omeprazole, and stopping atorvastatin at some point in the past without much improvement in itching.     Physical exam:  Vitals: /69 (BP Location: Right arm, Patient Position: Chair, Cuff Size: Adult Regular)  Pulse 56  GEN: This is a well developed, well-nourished male in no acute distress, in a pleasant mood.    SKIN: Focused examination of the upper body was performed.  -There are 100s of waxy stuck on tan to brown papules on the upper body.  - There's dermatographism on the back  - Bruises on arms and legs  -No other lesions of concern on areas examined.     Impression/Plan:  # Generalized pruritus  -  Carries a previous diagnosis of bullous pemphigoid but no evidence of that currently on exam.  Currently there's evidence of 100s of SKs that may be causing some pruritus, some evidence of dermatographism, and some xerosis.  Elevated eosinophil in CBC with diff 2016 (6.5%), will obtain workup for systemic cause of pruritus with CBC with diff, TSH, and hepatic panel.  Will obtain outside biopsy records.  - Use vanicream soap and use vanicream lotion BID  - Use triamcinolone once a day or once every other day  - Start fexofenadine 180mg QAM for itching  - Continue hydroxyzine 25mg QHS for itching    CC Dr. Bishop on close of this encounter.  Follow-up in 4 weeks, earlier for new or changing lesions.     Dr. Aviles staffed the patient.  Staff Physician Comments:  I saw and evaluated the patient with the resident and I agree with the assessment and plan. I was present for the key portions of the above major procedure and examination.    En Aviles MD  Professor  Head of Dermato-Allergy Division  Department of Dermatology  [facility]    Staff Involved:  Resident(Thao Geller)/Staff(as above)

## 2018-05-18 NOTE — PATIENT INSTRUCTIONS
- Take fexofenadine in the morning  - Take hydoxyzine at night  - Use vanicream lotion twice a day  - Use vanicream soap  - Follow up in 1 month    For wife: minoxidil cream

## 2018-05-18 NOTE — PROGRESS NOTES
[facility] Dermatology Note      Dermatology Problem List:  1. Pruritus    Encounter Date: May 18, 2018    CC:   Chief Complaint   Patient presents with     Derm Problem     Hermilo is here to discuss a rash that is on his back. He states that he noticed it about 6 months ago.     History of Present Illness:  Mr. Hermilo Velasquez is a 85 year old male who presents for evaluation of pruritus.  Patient says he has had this for 6 months.  No further chart review it appears that patient at one point was on mycophenolate and prednisone for diagnosis of bullous pemphigoid.  He says he has been tapered off of both of those medications as a dermatologist told him he didn't have BP.  He has seen at least 3 different dermatologists in the past and has had multiple skin biopsies but did not bring any record.    He says he is always itchy.  Itching worse after showers.     Extensive PMH and medication hx reviewed: prosthetic AVR, neuropathy, pAfib, hx of PE, on warfarin.     He has tried stopping soap, stopping omeprazole, and stopping atorvastatin at some point in the past without much improvement in itching.     Physical exam:  Vitals: /69 (BP Location: Right arm, Patient Position: Chair, Cuff Size: Adult Regular)  Pulse 56  GEN: This is a well developed, well-nourished male in no acute distress, in a pleasant mood.    SKIN: Focused examination of the upper body was performed.  -There are 100s of waxy stuck on tan to brown papules on the upper body.  - There's dermatographism on the back  - Bruises on arms and legs  -No other lesions of concern on areas examined.     Impression/Plan:  # Generalized pruritus  - Carries a previous diagnosis of bullous pemphigoid but no evidence of that currently on exam.  Currently there's evidence of 100s of SKs that may be causing some pruritus, some evidence of dermatographism, and some xerosis.  Elevated eosinophil in CBC with diff 2016 (6.5%), will obtain workup  for systemic cause of pruritus with CBC with diff, TSH, and hepatic panel.  Will obtain outside biopsy records.  - Use vanicream soap and use vanicream lotion BID  - Use triamcinolone once a day or once every other day  - Start fexofenadine 180mg QAM for itching  - Continue hydroxyzine 25mg QHS for itching    CC Dr. Bishop on close of this encounter.  Follow-up in 4 weeks, earlier for new or changing lesions.     Dr. Aviles staffed the patient.  Staff Physician Comments:  I saw and evaluated the patient with the resident and I agree with the assessment and plan. I was present for the key portions of the above major procedure and examination.    En Aviles MD  Professor  Head of Dermato-Allergy Division  Department of Dermatology  [facility], [place], [place]    Staff Involved:  Resident(Thao Geller)/Staff(as above)

## 2018-05-29 ENCOUNTER — TELEPHONE (OUTPATIENT)
Dept: DERMATOLOGY | Facility: CLINIC | Age: 83
End: 2018-05-29

## 2018-05-29 DIAGNOSIS — L29.9 PRURITUS: Primary | ICD-10-CM

## 2018-05-29 NOTE — TELEPHONE ENCOUNTER
[facility] Call Center    Phone Message    May a detailed message be left on voicemail: yes    Reason for Call: Other: Pt states that the Rx prescribed to him by Traci is not helping. Pt has been using cream and ointments on his itch and nothing has helped. Pt would like a call back from the clinic to discuss what other options he has and advise pt how to proceed. Please leave detailed vm if pt does not .      Action Taken: Message routed to:  [facility] ([facility]ULT [facility]

## 2018-05-29 NOTE — TELEPHONE ENCOUNTER
Received below message as the resident on call. Called patient to discuss his concerns, as Dr. Aviles is out of town currently.   He doesn't feel like any of the treatments Dr. Aviles started at his last visit are working (dry skin care with vanicream soap and moisturizer, triamcinolone ointment, Allegra, and he had already been previously on hydroxyzine as needed). He wonders if the itching is being caused by one of his chronic medications, such as warfarin. I said I would pass this thought on to Dr. Aviles, and it could be discussed further at his next visit with him.   Discussed with patient that it is still early since he just started these measures <2 weeks ago, and he should continue to give all the above therapies a chance. Will also add camphor-menthol 0.5%-0.5% lotion - patient may apply this as often as desired. Sent prescription to his pharmacy. Discussed that if too expensive to fill, he could also try Sarna OTC which might be cheaper. I also reminded patient to go to any [facility] lab to have his blood drawn, as Dr. Aviles ordered LFTs, HgbA1C, TSH/T4, and CBC and these have not been done yet.   Patient expressed understanding. He has follow up scheduled with Dr. Aviles for July.     Brandee Cuevas MD  Dermatology Resident PGY2

## 2018-06-06 ENCOUNTER — ANTICOAGULATION THERAPY VISIT (OUTPATIENT)
Dept: NURSING | Facility: CLINIC | Age: 83
End: 2018-06-06
Payer: COMMERCIAL

## 2018-06-06 DIAGNOSIS — Z79.01 LONG-TERM (CURRENT) USE OF ANTICOAGULANTS: ICD-10-CM

## 2018-06-06 DIAGNOSIS — I26.99 PULMONARY EMBOLISM, BILATERAL (H): ICD-10-CM

## 2018-06-06 DIAGNOSIS — I48.0 PAROXYSMAL ATRIAL FIBRILLATION (H): ICD-10-CM

## 2018-06-06 LAB — INR POINT OF CARE: 3.2 (ref 0.86–1.14)

## 2018-06-06 PROCEDURE — 99207 ZZC NO CHARGE NURSE ONLY: CPT

## 2018-06-06 PROCEDURE — 85610 PROTHROMBIN TIME: CPT | Mod: QW

## 2018-06-06 PROCEDURE — 36416 COLLJ CAPILLARY BLOOD SPEC: CPT

## 2018-06-06 NOTE — PROGRESS NOTES
ANTICOAGULATION FOLLOW-UP CLINIC VISIT    Patient Name:  Hermilo Velasquez  Date:  6/6/2018  Contact Type:  Face to Face    SUBJECTIVE:     Patient Findings     Positives No Problem Findings           OBJECTIVE    INR Protime   Date Value Ref Range Status   06/06/2018 3.2 (A) 0.86 - 1.14 Final       ASSESSMENT / PLAN  INR assessment SUPRA    Recheck INR In: 4 WEEKS    INR Location Clinic      Anticoagulation Summary as of 6/6/2018     INR goal 2.0-3.0   Today's INR 3.2!   Warfarin maintenance plan 5 mg (5 mg x 1) every day   Full warfarin instructions 5 mg every day   Weekly warfarin total 35 mg   Weekly max warfarin dose 45 mg   No change documented Fernanda Kenny RN   Plan last modified Janneth Allen RN (8/22/2017)   Next INR check 7/5/2018   Target end date Indefinite    Indications   Long-term (current) use of anticoagulants [Z79.01] [Z79.01]  Pulmonary embolism  bilateral (H) [I26.99]  Paroxysmal atrial fibrillation (H) [I48.0]         Anticoagulation Episode Summary     [facility] check [facility]    Preferred lab     Send INR reminders to  ANTICOAGULATION    Comments       Anticoagulation Care Providers     Provider Role Specialty Phone number    Karson Bishop MD Responsible Internal Medicine [phone]            See the Encounter Report to view Anticoagulation Flowsheet and Dosing Calendar (Go to Encounters tab in chart review, and find the Anticoagulation Therapy Visit)    Dosage adjustment made based on physician directed care plan.  Patient unsure why INRs have been SUPRA  Advised a salad or some type of greens today    Fernanda Kenny RN

## 2018-06-06 NOTE — MR AVS SNAPSHOT
Hermilo MADRID Leakevin   6/6/2018 11:15 AM   Anticoagulation Therapy Visit    Description:  85 year old male   Provider:   ANTICOAGULATION CLINIC   Department:   Nurse           INR as of 6/6/2018     Today's INR 3.2!      Anticoagulation Summary as of 6/6/2018     INR goal 2.0-3.0   Today's INR 3.2!   Full warfarin instructions 5 mg every day   Next INR check 7/5/2018    Indications   Long-term (current) use of anticoagulants [Z79.01] [Z79.01]  Pulmonary embolism  bilateral (H) [I26.99]  Paroxysmal atrial fibrillation (H) [I48.0]         Your next Anticoagulation Clinic appointment(s)     Jun 06, 2018 11:15 AM CDT   Anticoagulation Visit with  ANT[facility]AGULATION CLINIC   [facility]    [address]  [place] [address]   [phone]            Jul 05, 2018 11:30 AM CDT   Anticoagulation Visit with  ANTICOAGULATION CLINIC   [facility]    [address]  [place] [address]   [phone]              Contact Numbers     Clinic Number:         June 2018 Details    Sun Mon Tue Wed Thu Fri Sat          1               2                 3               4               5               6      5 mg   See details      7      5 mg         8      5 mg         9      5 mg           10      5 mg         11      5 mg         12      5 mg         13      5 mg         14      5 mg         15      5 mg         16      5 mg           17      5 mg         18      5 mg         19      5 mg         20      5 mg         21      5 mg         22      5 mg         23      5 mg           24      5 mg         25      5 mg         26      5 mg         27      5 mg         28      5 mg         29      5 mg         30      5 mg          Date Details   06/06 This INR check               How to take your warfarin dose     To take:  5 mg Take 1 of the 5 mg tablets.           July 2018 Details    Sun Mon Tue Wed Thu Fri Sat     1      5 mg         2      5 mg          3      5 mg         4      5 mg         5            6               7                 8               9               10               11               12               13               14                 15               16               17               18               19               20               21                 22               23               24               25               26               27               28                 29               30               31                    Date Details   No additional details    Date of next INR:  7/5/2018         How to take your warfarin dose     To take:  5 mg Take 1 of the 5 mg tablets.

## 2018-06-15 ENCOUNTER — TELEPHONE (OUTPATIENT)
Dept: DERMATOLOGY | Facility: CLINIC | Age: 83
End: 2018-06-15

## 2018-06-15 NOTE — TELEPHONE ENCOUNTER
[facility] Call Center    Phone Message    May a detailed message be left on voicemail: yes    Reason for Call: Other: Pt called to cancel his priti for 6/18 @ 11:45 w/ Dr. Aviles. Pt stated he was expecting follow up from Dr. Aviles regarding his itching. Pt stated he was told to stop taking an acid pill, yet he is still itching. Pt did not wish to reschedule the appt at this time as I could not guarantee a call back time from the clinic for him, and requested someone call him back.     Action Taken: Message routed to:  [facility] Derm

## 2018-07-04 DIAGNOSIS — K92.1 GASTROINTESTINAL HEMORRHAGE WITH MELENA: ICD-10-CM

## 2018-07-05 ENCOUNTER — ANTICOAGULATION THERAPY VISIT (OUTPATIENT)
Dept: NURSING | Facility: CLINIC | Age: 83
End: 2018-07-05
Payer: COMMERCIAL

## 2018-07-05 DIAGNOSIS — I48.0 PAROXYSMAL ATRIAL FIBRILLATION (H): ICD-10-CM

## 2018-07-05 DIAGNOSIS — I26.99 PULMONARY EMBOLISM, BILATERAL (H): ICD-10-CM

## 2018-07-05 DIAGNOSIS — Z79.01 LONG-TERM (CURRENT) USE OF ANTICOAGULANTS: ICD-10-CM

## 2018-07-05 LAB — INR POINT OF CARE: 2.1 (ref 0.86–1.14)

## 2018-07-05 PROCEDURE — 99207 ZZC NO CHARGE NURSE ONLY: CPT

## 2018-07-05 PROCEDURE — 85610 PROTHROMBIN TIME: CPT | Mod: QW

## 2018-07-05 PROCEDURE — 36416 COLLJ CAPILLARY BLOOD SPEC: CPT

## 2018-07-05 NOTE — TELEPHONE ENCOUNTER
"omeprazole (PRILOSEC) 20 MG CR capsule 180 capsule 2 10/16/2017     Last Written Prescription Date:  10/16/17  Last Fill Quantity: 180,  # refills: 2   Last office visit: 3/26/2018 with prescribing provider:  Edna   Future Office Visit:  none    Requested Prescriptions   Pending Prescriptions Disp Refills     omeprazole (PRILOSEC) 20 MG CR capsule [Pharmacy Med Name: Omeprazole Oral Capsule Delayed Release 20 MG] 180 capsule 1     Sig: TAKE TWO CAPSULES BY MOUTH DAILY    PPI Protocol Passed    7/4/2018 10:50 AM       Passed - Not on Clopidogrel (unless Pantoprazole ordered)       Passed - No diagnosis of osteoporosis on record       Passed - Recent (12 mo) or future (30 days) visit within the authorizing provider's specialty    Patient had office visit in the last 12 months or has a visit in the next 30 days with authorizing provider or within the authorizing provider's specialty.  See \"Patient Info\" tab in inbasket, or \"Choose Columns\" in Meds & Orders section of the refill encounter.           Passed - Patient is age 18 or older        No flowsheet data found.  "

## 2018-07-05 NOTE — PROGRESS NOTES
ANTICOAGULATION FOLLOW-UP CLINIC VISIT    Patient Name:  Hermilo Velasquez  Date:  7/5/2018  Contact Type:  Face to Face    SUBJECTIVE:     Patient Findings     Positives No Problem Findings           OBJECTIVE    INR Protime   Date Value Ref Range Status   07/05/2018 2.1 (A) 0.86 - 1.14 Final       ASSESSMENT / PLAN  INR assessment THER    Recheck INR In: 6 WEEKS    INR Location Clinic      Anticoagulation Summary as of 7/5/2018     INR goal 2.0-3.0   Today's INR 2.1   Warfarin maintenance plan 5 mg (5 mg x 1) every day   Full warfarin instructions 5 mg every day   Weekly warfarin total 35 mg   Weekly max warfarin dose 45 mg   No change documented Evie Mckeon RN   Plan last modified Janneth Allen RN (8/22/2017)   Next INR check 8/15/2018   Target end date Indefinite    Indications   Long-term (current) use of anticoagulants [Z79.01] [Z79.01]  Pulmonary embolism  bilateral (H) [I26.99]  Paroxysmal atrial fibrillation (H) [I48.0]         Anticoagulation Episode Summary     [facility]    Preferred lab     Send INR reminders to  ANTICOAGULATION    Comments       Anticoagulation Care Providers     Provider Role Specialty Phone number    Karson Bishop MD Responsible Internal Medicine [phone]            See the Encounter Report to view Anticoagulation Flowsheet and Dosing Calendar (Go to Encounters tab in chart review, and find the Anticoagulation Therapy Visit)    Dosage adjustment made based on physician directed care plan.    Evie Mckeon, RN

## 2018-07-05 NOTE — MR AVS SNAPSHOT
Hermilo MADRID Eva   7/5/2018 11:30 AM   Anticoagulation Therapy Visit    Description:  85 year old male   Provider:   ANTICOAGULATION CLINIC   Department:  [facility] Nurse           INR as of 7/5/2018     Today's INR 2.1      Anticoagulation Summary as of 7/5/2018     INR goal 2.0-3.0   Today's INR 2.1   Full warfarin instructions 5 mg every day   Next INR check 8/15/2018    Indications   Long-term (current) use of anticoagulants [Z79.01] [Z79.01]  Pulmonary embolism  bilateral (H) [I26.99]  Paroxysmal atrial fibrillation (H) [I48.0]         Your next Anticoagulation Clinic appointment(s)     Aug 15, 2018 11:30 AM CDT   Anticoagulation Visit with  ANTICOAGULATION CLINIC   [facility] [place] ([facility])    [address]  [place] [address]   [phone]              Contact Numbers     Clinic Number:         July 2018 Details    Sun Mon Tue Wed Thu Fri Sat     1               2               3               4               5      5 mg   See details      6      5 mg         7      5 mg           8      5 mg         9      5 mg         10      5 mg         11      5 mg         12      5 mg         13      5 mg         14      5 mg           15      5 mg         16      5 mg         17      5 mg         18      5 mg         19      5 mg         20      5 mg         21      5 mg           22      5 mg         23      5 mg         24      5 mg         25      5 mg         26      5 mg         27      5 mg         28      5 mg           29      5 mg         30      5 mg         31      5 mg              Date Details   07/05 This INR check               How to take your warfarin dose     To take:  5 mg Take 1 of the 5 mg tablets.           August 2018 Details    Sun Mon Tue Wed Thu Fri Sat        1      5 mg         2      5 mg         3      5 mg         4      5 mg           5      5 mg         6      5 mg         7      5 mg         8      5 mg         9      5 mg         10      5 mg         11       5 mg           12      5 mg         13      5 mg         14      5 mg         15            16               17               18                 19               20               21               22               23               24               25                 26               27               28               29               30               31                 Date Details   No additional details    Date of next INR:  8/15/2018         How to take your warfarin dose     To take:  5 mg Take 1 of the 5 mg tablets.

## 2018-07-06 NOTE — TELEPHONE ENCOUNTER
Prescription approved per [facility] Refill Protocol.    Tu MADRID RN

## 2018-07-23 ENCOUNTER — TELEPHONE (OUTPATIENT)
Dept: FAMILY MEDICINE | Facility: CLINIC | Age: 83
End: 2018-07-23

## 2018-07-23 ENCOUNTER — OFFICE VISIT (OUTPATIENT)
Dept: FAMILY MEDICINE | Facility: CLINIC | Age: 83
End: 2018-07-23
Payer: COMMERCIAL

## 2018-07-23 ENCOUNTER — APPOINTMENT (OUTPATIENT)
Age: 83
Setting detail: DERMATOLOGY
End: 2018-07-24

## 2018-07-23 ENCOUNTER — HOSPITAL ENCOUNTER (INPATIENT)
Facility: CLINIC | Age: 83
LOS: 7 days | Discharge: HOME | DRG: 812 | End: 2018-07-30
Attending: EMERGENCY MEDICINE | Admitting: HOSPITALIST
Payer: COMMERCIAL

## 2018-07-23 ENCOUNTER — APPOINTMENT (OUTPATIENT)
Dept: GENERAL RADIOLOGY | Facility: CLINIC | Age: 83
DRG: 812 | End: 2018-07-23
Attending: EMERGENCY MEDICINE
Payer: COMMERCIAL

## 2018-07-23 VITALS
DIASTOLIC BLOOD PRESSURE: 30 MMHG | HEART RATE: 35 BPM | HEIGHT: 70 IN | WEIGHT: 174 LBS | SYSTOLIC BLOOD PRESSURE: 90 MMHG | OXYGEN SATURATION: 97 % | BODY MASS INDEX: 24.91 KG/M2 | TEMPERATURE: 97.8 F

## 2018-07-23 DIAGNOSIS — D64.9 SYMPTOMATIC ANEMIA: ICD-10-CM

## 2018-07-23 DIAGNOSIS — R42 DIZZINESS: ICD-10-CM

## 2018-07-23 DIAGNOSIS — I95.1 ORTHOSTATIC HYPOTENSION: ICD-10-CM

## 2018-07-23 DIAGNOSIS — R42 LIGHTHEADEDNESS: ICD-10-CM

## 2018-07-23 DIAGNOSIS — M25.561 RIGHT KNEE PAIN, UNSPECIFIED CHRONICITY: Primary | ICD-10-CM

## 2018-07-23 DIAGNOSIS — D50.9 IRON DEFICIENCY ANEMIA, UNSPECIFIED IRON DEFICIENCY ANEMIA TYPE: ICD-10-CM

## 2018-07-23 DIAGNOSIS — R00.1 BRADYCARDIA: ICD-10-CM

## 2018-07-23 DIAGNOSIS — L82.0 INFLAMED SEBORRHEIC KERATOSIS: ICD-10-CM

## 2018-07-23 DIAGNOSIS — L27.0 GENERALIZED SKIN ERUPTION DUE TO DRUGS AND MEDICAMENTS TAKEN INTERNALLY: ICD-10-CM

## 2018-07-23 DIAGNOSIS — I10 BENIGN ESSENTIAL HYPERTENSION: ICD-10-CM

## 2018-07-23 DIAGNOSIS — R00.1 BRADYCARDIA: Primary | ICD-10-CM

## 2018-07-23 LAB
ABO + RH BLD: NORMAL
ABO + RH BLD: NORMAL
ANION GAP SERPL CALCULATED.3IONS-SCNC: 7 MMOL/L (ref 3–14)
BASOPHILS # BLD AUTO: 0 10E9/L (ref 0–0.2)
BASOPHILS NFR BLD AUTO: 0.3 %
BLD GP AB SCN SERPL QL: NORMAL
BLD PROD TYP BPU: NORMAL
BLOOD BANK CMNT PATIENT-IMP: NORMAL
BUN SERPL-MCNC: 24 MG/DL (ref 7–30)
CALCIUM SERPL-MCNC: 8.2 MG/DL (ref 8.5–10.1)
CHLORIDE SERPL-SCNC: 107 MMOL/L (ref 94–109)
CO2 SERPL-SCNC: 26 MMOL/L (ref 20–32)
CREAT SERPL-MCNC: 1.42 MG/DL (ref 0.66–1.25)
DIFFERENTIAL METHOD BLD: ABNORMAL
ELLIPTOCYTES BLD QL SMEAR: SLIGHT
EOSINOPHIL # BLD AUTO: 0.8 10E9/L (ref 0–0.7)
EOSINOPHIL NFR BLD AUTO: 10.6 %
ERYTHROCYTE [DISTWIDTH] IN BLOOD BY AUTOMATED COUNT: 17.4 % (ref 10–15)
FERRITIN SERPL-MCNC: 8 NG/ML (ref 26–388)
GFR SERPL CREATININE-BSD FRML MDRD: 47 ML/MIN/1.7M2
GLUCOSE SERPL-MCNC: 109 MG/DL (ref 70–99)
HCT VFR BLD AUTO: 27.3 % (ref 40–53)
HGB BLD-MCNC: 7.8 G/DL (ref 13.3–17.7)
IMM GRANULOCYTES # BLD: 0 10E9/L (ref 0–0.4)
IMM GRANULOCYTES NFR BLD: 0.1 %
INR PPP: 2.43 (ref 0.86–1.14)
INTERPRETATION ECG - MUSE: NORMAL
IRON SATN MFR SERPL: 4 % (ref 15–46)
IRON SERPL-MCNC: 15 UG/DL (ref 35–180)
LYMPHOCYTES # BLD AUTO: 1 10E9/L (ref 0.8–5.3)
LYMPHOCYTES NFR BLD AUTO: 13.4 %
MAGNESIUM SERPL-MCNC: 2.1 MG/DL (ref 1.6–2.3)
MCH RBC QN AUTO: 19.1 PG (ref 26.5–33)
MCHC RBC AUTO-ENTMCNC: 28.6 G/DL (ref 31.5–36.5)
MCV RBC AUTO: 67 FL (ref 78–100)
MONOCYTES # BLD AUTO: 1 10E9/L (ref 0–1.3)
MONOCYTES NFR BLD AUTO: 13.4 %
NEUTROPHILS # BLD AUTO: 4.8 10E9/L (ref 1.6–8.3)
NEUTROPHILS NFR BLD AUTO: 62.2 %
NRBC # BLD AUTO: 0 10*3/UL
NRBC BLD AUTO-RTO: 0 /100
NUM BPU REQUESTED: 1
OVALOCYTES BLD QL SMEAR: SLIGHT
PHOSPHATE SERPL-MCNC: 3.9 MG/DL (ref 2.5–4.5)
PLATELET # BLD AUTO: 202 10E9/L (ref 150–450)
PLATELET # BLD EST: ABNORMAL 10*3/UL
POTASSIUM SERPL-SCNC: 4.4 MMOL/L (ref 3.4–5.3)
RBC # BLD AUTO: 4.09 10E12/L (ref 4.4–5.9)
RBC INCLUSIONS BLD: SLIGHT
SODIUM SERPL-SCNC: 140 MMOL/L (ref 133–144)
SPECIMEN EXP DATE BLD: NORMAL
TIBC SERPL-MCNC: 368 UG/DL (ref 240–430)
TROPONIN I SERPL-MCNC: <0.015 UG/L (ref 0–0.04)
WBC # BLD AUTO: 7.7 10E9/L (ref 4–11)

## 2018-07-23 PROCEDURE — 99213 OFFICE O/P EST LOW 20 MIN: CPT

## 2018-07-23 PROCEDURE — 85025 COMPLETE CBC W/AUTO DIFF WBC: CPT | Performed by: EMERGENCY MEDICINE

## 2018-07-23 PROCEDURE — 83550 IRON BINDING TEST: CPT | Performed by: EMERGENCY MEDICINE

## 2018-07-23 PROCEDURE — OTHER DIAGNOSIS COMMENT: OTHER

## 2018-07-23 PROCEDURE — 99285 EMERGENCY DEPT VISIT HI MDM: CPT | Mod: 25

## 2018-07-23 PROCEDURE — 93005 ELECTROCARDIOGRAM TRACING: CPT

## 2018-07-23 PROCEDURE — 25000125 ZZHC RX 250: Performed by: HOSPITALIST

## 2018-07-23 PROCEDURE — OTHER MIPS QUALITY: OTHER

## 2018-07-23 PROCEDURE — 83540 ASSAY OF IRON: CPT | Performed by: EMERGENCY MEDICINE

## 2018-07-23 PROCEDURE — 99214 OFFICE O/P EST MOD 30 MIN: CPT | Performed by: INTERNAL MEDICINE

## 2018-07-23 PROCEDURE — 86901 BLOOD TYPING SEROLOGIC RH(D): CPT | Performed by: EMERGENCY MEDICINE

## 2018-07-23 PROCEDURE — 80048 BASIC METABOLIC PNL TOTAL CA: CPT | Performed by: EMERGENCY MEDICINE

## 2018-07-23 PROCEDURE — 86923 COMPATIBILITY TEST ELECTRIC: CPT | Performed by: EMERGENCY MEDICINE

## 2018-07-23 PROCEDURE — 85610 PROTHROMBIN TIME: CPT | Performed by: EMERGENCY MEDICINE

## 2018-07-23 PROCEDURE — 84484 ASSAY OF TROPONIN QUANT: CPT | Performed by: EMERGENCY MEDICINE

## 2018-07-23 PROCEDURE — 71046 X-RAY EXAM CHEST 2 VIEWS: CPT

## 2018-07-23 PROCEDURE — 86900 BLOOD TYPING SEROLOGIC ABO: CPT | Performed by: EMERGENCY MEDICINE

## 2018-07-23 PROCEDURE — OTHER TREATMENT REGIMEN: OTHER

## 2018-07-23 PROCEDURE — OTHER COUNSELING: OTHER

## 2018-07-23 PROCEDURE — 82728 ASSAY OF FERRITIN: CPT | Performed by: EMERGENCY MEDICINE

## 2018-07-23 PROCEDURE — 25000132 ZZH RX MED GY IP 250 OP 250 PS 637: Performed by: HOSPITALIST

## 2018-07-23 PROCEDURE — 12000000 ZZH R&B MED SURG/OB

## 2018-07-23 PROCEDURE — 25000128 H RX IP 250 OP 636: Performed by: HOSPITALIST

## 2018-07-23 PROCEDURE — 86850 RBC ANTIBODY SCREEN: CPT | Performed by: EMERGENCY MEDICINE

## 2018-07-23 PROCEDURE — 84100 ASSAY OF PHOSPHORUS: CPT | Performed by: EMERGENCY MEDICINE

## 2018-07-23 PROCEDURE — 99207 ZZC CDG-MDM COMPONENT: MEETS MODERATE - UP CODED: CPT | Performed by: HOSPITALIST

## 2018-07-23 PROCEDURE — 99223 1ST HOSP IP/OBS HIGH 75: CPT | Mod: AI | Performed by: HOSPITALIST

## 2018-07-23 PROCEDURE — 83735 ASSAY OF MAGNESIUM: CPT | Performed by: EMERGENCY MEDICINE

## 2018-07-23 RX ORDER — TRIAMCINOLONE ACETONIDE 1 MG/G
CREAM TOPICAL 2 TIMES DAILY PRN
COMMUNITY
End: 2020-04-13

## 2018-07-23 RX ORDER — HYDROXYZINE HYDROCHLORIDE 25 MG/1
25 TABLET, FILM COATED ORAL 3 TIMES DAILY PRN
Status: DISCONTINUED | OUTPATIENT
Start: 2018-07-23 | End: 2018-07-30 | Stop reason: HOSPADM

## 2018-07-23 RX ORDER — ALBUTEROL SULFATE 90 UG/1
1-2 AEROSOL, METERED RESPIRATORY (INHALATION) EVERY 6 HOURS PRN
Status: DISCONTINUED | OUTPATIENT
Start: 2018-07-23 | End: 2018-07-30 | Stop reason: HOSPADM

## 2018-07-23 RX ORDER — ONDANSETRON 4 MG/1
4 TABLET, ORALLY DISINTEGRATING ORAL EVERY 6 HOURS PRN
Status: DISCONTINUED | OUTPATIENT
Start: 2018-07-23 | End: 2018-07-30 | Stop reason: HOSPADM

## 2018-07-23 RX ORDER — NALOXONE HYDROCHLORIDE 0.4 MG/ML
.1-.4 INJECTION, SOLUTION INTRAMUSCULAR; INTRAVENOUS; SUBCUTANEOUS
Status: DISCONTINUED | OUTPATIENT
Start: 2018-07-23 | End: 2018-07-30 | Stop reason: HOSPADM

## 2018-07-23 RX ORDER — TRIAMCINOLONE ACETONIDE 1 MG/G
CREAM TOPICAL 2 TIMES DAILY PRN
Status: DISCONTINUED | OUTPATIENT
Start: 2018-07-23 | End: 2018-07-30 | Stop reason: HOSPADM

## 2018-07-23 RX ORDER — ATORVASTATIN CALCIUM 10 MG/1
10 TABLET, FILM COATED ORAL AT BEDTIME
Status: DISCONTINUED | OUTPATIENT
Start: 2018-07-23 | End: 2018-07-30 | Stop reason: HOSPADM

## 2018-07-23 RX ORDER — ONDANSETRON 2 MG/ML
4 INJECTION INTRAMUSCULAR; INTRAVENOUS EVERY 6 HOURS PRN
Status: DISCONTINUED | OUTPATIENT
Start: 2018-07-23 | End: 2018-07-30 | Stop reason: HOSPADM

## 2018-07-23 RX ORDER — ACETAMINOPHEN 325 MG/1
650 TABLET ORAL EVERY 4 HOURS PRN
Status: DISCONTINUED | OUTPATIENT
Start: 2018-07-23 | End: 2018-07-26

## 2018-07-23 RX ORDER — SODIUM CHLORIDE 9 MG/ML
INJECTION, SOLUTION INTRAVENOUS CONTINUOUS
Status: DISCONTINUED | OUTPATIENT
Start: 2018-07-23 | End: 2018-07-27

## 2018-07-23 RX ADMIN — ATORVASTATIN CALCIUM 10 MG: 10 TABLET, FILM COATED ORAL at 21:34

## 2018-07-23 RX ADMIN — FAMOTIDINE 20 MG: 10 INJECTION INTRAVENOUS at 21:34

## 2018-07-23 RX ADMIN — TRIAMCINOLONE ACETONIDE: 1 CREAM TOPICAL at 22:16

## 2018-07-23 RX ADMIN — HYDROXYZINE HYDROCHLORIDE 25 MG: 25 TABLET, FILM COATED ORAL at 21:34

## 2018-07-23 RX ADMIN — SODIUM CHLORIDE: 9 INJECTION, SOLUTION INTRAVENOUS at 20:45

## 2018-07-23 ASSESSMENT — LOCATION DETAILED DESCRIPTION DERM
LOCATION DETAILED: LEFT PROXIMAL POSTERIOR UPPER ARM
LOCATION DETAILED: RIGHT SUPERIOR MEDIAL UPPER BACK
LOCATION DETAILED: INFERIOR THORACIC SPINE
LOCATION DETAILED: SUPERIOR LUMBAR SPINE
LOCATION DETAILED: RIGHT PROXIMAL POSTERIOR UPPER ARM

## 2018-07-23 ASSESSMENT — LOCATION SIMPLE DESCRIPTION DERM
LOCATION SIMPLE: RIGHT UPPER ARM
LOCATION SIMPLE: UPPER BACK
LOCATION SIMPLE: RIGHT UPPER BACK
LOCATION SIMPLE: LOWER BACK
LOCATION SIMPLE: LEFT UPPER ARM

## 2018-07-23 ASSESSMENT — ENCOUNTER SYMPTOMS
HEADACHES: 0
SHORTNESS OF BREATH: 1
VOMITING: 0
COUGH: 0
DIZZINESS: 0
WEAKNESS: 0
CHEST TIGHTNESS: 0
NAUSEA: 0
COLOR CHANGE: 0

## 2018-07-23 ASSESSMENT — ACTIVITIES OF DAILY LIVING (ADL): ADLS_ACUITY_SCORE: 14

## 2018-07-23 ASSESSMENT — LOCATION ZONE DERM
LOCATION ZONE: ARM
LOCATION ZONE: TRUNK

## 2018-07-23 NOTE — TELEPHONE ENCOUNTER
Reason for call:  Patient reporting a symptom    Symptom or request: Low BP Concerns      Duration (how long have symptoms been present): Since yesterday 92/56, 88/48 later yesterday it was 120/50  To day it has been 94/36 and 91/47  Patient also felt Dizzy yesterday     Have you been treated for this before? No    Additional comments: please call to discuss patient wants to see Dr Bishop    Phone Number patient can be reached at:  Home number on file [phone] (home)    Best Time:  anytime    Can we leave a detailed message on this number:  YES    Call taken on 7/23/2018 at 9:52 AM by Neha Darling

## 2018-07-23 NOTE — PROCEDURE: MIPS QUALITY
Detail Level: Detailed
Quality 226: Preventive Care And Screening: Tobacco Use: Screening And Cessation Intervention: Patient screened for tobacco and is an ex-smoker
Quality 130: Documentation Of Current Medications In The Medical Record: Current Medications Documented
Quality 431: Preventive Care And Screening: Unhealthy Alcohol Use - Screening: Patient screened for unhealthy alcohol use using a single question and scores less than 2 times per year
Quality 110: Preventive Care And Screening: Influenza Immunization: Influenza Immunization previously received during influenza season

## 2018-07-23 NOTE — LETTER
Transition Communication Hand-off for Care Transitions to Next Level of Care Provider    Name: Hermilo Velasquez  : 11/3/1932  MRN #: [id number]  Primary Care Provider: Karson Bishop     Primary Clinic: [address]  [place] [address]     Reason for Hospitalization:  Lightheadedness [R42]  Bradycardia [R00.1]  Symptomatic anemia [D64.9]  Admit Date/Time: 2018  5:04 PM  Discharge Date: 18  Payor Source: Payor: UCARE / Plan: [employer] FOR [employer] / Product Type: HMO /     Readmission Assessment Measure (GIOVANNY) Risk Score/category: elevated           Reason for Communication Hand-off Referral: Other Elevated GIOVANNY    Discharge Plan:  Discharge Plan:       Most Recent Value    Concerns To Be Addressed all concerns addressed in this encounter           Concern for non-adherence with plan of care:   Y/N No  Discharge Needs Assessment:  Needs       Most Recent Value    Anticipated Changes Related to Illness none    Equipment Currently Used at Home cane, straight    Transportation Available car          Already enrolled in Tele-monitoring program and name of program: NA  Follow-up plan:  Future Appointments  Date Time Provider Department Center                 2018 11:00 AM CS LAB [facility]   8/3/2018 2:30 PM Karson Bishop MD [facility]    8/15/2018 11:30 AM CS ANTICOAGULATION CLINIC [facility]        Any outstanding tests or procedures:        Referrals     Future Labs/Procedures    MED THERAPY MANAGE REFERRAL     Comments:    Patient has diagnosis of Diabetes, Heart Failure, COPD, or AMI and is on more than 5 medications            Key Recommendations:  Discharged 18. Admitted with hypotension and anemia.  His Hgb was 7.8 on admit and 7.1 on hospital discharge.  He was directed to have his Hgb and INR drawn .  He will need follow up on biopsies from colonoscopy.  He was having issues with R Knee Pain and R great toe pain, it was suspected gout/pseudogout.  Pt's strength greatly improved to  allow safe discharge home instead of rehab placement.  His Metoprolol was discontinued.  Pt is oriented and lives with his spouse.    Thank-you,    Ale Gonsales  RN Care Coordinator  [facility]    AVS/Discharge Summary is the source of truth; this is a helpful guide for improved communication of patient story

## 2018-07-23 NOTE — MR AVS SNAPSHOT
"              After Visit Summary   7/23/2018    Hermilo Velasquez    MRN: [id number]           Patient Information     Date Of Birth          11/3/1932        Visit Information        Provider Department      7/23/2018 4:20 PM Liv Mc MD [facility]        Today's Diagnoses     Bradycardia    -  1    Orthostatic hypotension        Dizziness           Follow-ups after your visit        Your next 10 appointments already scheduled     Aug 15, 2018 11:30 AM CDT   Anticoagulation Visit with [facility]ULATION CLINIC   [facility]    [address]  [place] [address] [phone]              Who to contact     If you have questions or need follow up information about today's clinic visit or your schedule please contact [facility] directly at [phone].  Normal or non-critical lab and imaging results will be communicated to you by MyChart, letter or phone within 4 business days after the clinic has received the results. If you do not hear from us within 7 days, please contact the clinic through MyChart or phone. If you have a critical or abnormal lab result, we will notify you by phone as soon as possible.  Submit refill requests through [employer] or call your pharmacy and they will forward the refill request to us. Please allow 3 business days for your refill to be completed.          Additional Information About Your Visit        Care EveryWhere ID     This is your Care EveryWhere ID. This could be used by other organizations to access your [facility] medical records  [id number]        Your Vitals Were     Pulse Temperature Height Pulse Oximetry BMI (Body Mass Index)       35 97.8  F (36.6  C) (Oral) 5' 10\" (1.778 m) 97% 24.97 kg/m2        Blood Pressure from Last 3 Encounters:   07/23/18 (!) 90/30   05/18/18 131/69   03/26/18 128/62    Weight from Last 3 Encounters:   07/23/18 174 lb (78.9 kg)   03/26/18 188 lb (85.3 kg)   03/19/18 190 lb " (86.2 kg)              Today, you had the following     No orders found for display       Primary Care Provider Office Phone # Fax #    Karson Bishop [phone] [address] [address]        Equal Access to Services     [facility] CenterAUBRIE : Hadii aad ku hadasho Soomaali, waaxda luqadaha, qaybta kaalmada adeegyada, waxay idiin hayaan adeeg ade laParultedmartha . So [facility] [phone].    ATENCIÓN: Si habla español, tiene a randle disposición servicios gratuitos de asistencia lingüística. Llame al [phone].    We comply with applicable federal civil rights laws and [place] laws. We do not discriminate on the basis of race, color, national origin, age, disability, sex, sexual orientation, or gender identity.            Thank you!     Thank you for choosing [facility]  for your care. Our goal is always to provide you with excellent care. Hearing back from our patients is one way we can continue to improve our services. Please take a few minutes to complete the written survey that you may receive in the mail after your visit with us. Thank you!             Your Updated Medication List - Protect others around you: Learn how to safely use, store and throw away your medicines at www.disposemymeds.org.          This list is accurate as of 7/23/18  4:55 PM.  Always use your most recent med list.                   Brand Name Dispense Instructions for use Diagnosis    albuterol 108 (90 Base) MCG/ACT Inhaler    PROAIR HFA/PROVENTIL HFA/VENTOLIN HFA    3 Inhaler    Inhale 1-2 puffs into the lungs every 6 hours as needed for shortness of breath / dyspnea or wheezing    Chronic obstructive pulmonary disease, unspecified COPD type (H)       atorvastatin 10 MG tablet    LIPITOR    90 tablet    Take 1 tablet (10 mg) by mouth daily    Mixed hyperlipidemia       camphor-menthol 0.5-0.5 % Lotn    DERMASARRA    444 mL    Apply a thin layer to itchy areas as often as desired    Pruritus        fexofenadine 60 MG tablet    ALLEGRA    60 tablet    Take 3 tablets (180 mg) by mouth daily    Pruritus       hydrOXYzine 25 MG tablet    ATARAX    120 tablet    Take 1 to 2 tablets (25-50 mg) by mouth every 6 hours as needed for itching    Chronic pruritus       metoprolol succinate 100 MG 24 hr tablet    TOPROL-XL    90 tablet    Take 1 tablet (100 mg) by mouth daily    Benign essential hypertension       omeprazole 20 MG CR capsule    priLOSEC    180 capsule    TAKE TWO CAPSULES BY MOUTH DAILY    Gastrointestinal hemorrhage with melena       tiotropium 18 MCG capsule    SPIRIVA HANDIHALER    90 capsule    Inhale 1 capsule (18 mcg) into the lungs daily    Chronic obstructive pulmonary disease, unspecified COPD type (H)       warfarin 5 MG tablet    COUMADIN    140 tablet    TAKE 1 TO 1.5 TABLETS (5 TO 7.5 MG) BY MOUTH DAILY. TAKE AS PRESCRIBED BY INR CLINIC. CURRENT DOSE IS 7.5 MG FOR 2 DAYS AND 5 MG FOR 5 DAYS.    Paroxysmal atrial fibrillation (H)

## 2018-07-23 NOTE — IP AVS SNAPSHOT
MRN:[id number]                      After Visit Summary   7/23/2018    Hermilo Velasquez    MRN: [id number]           Thank you!     Thank you for choosing [facility] for your care. Our goal is always to provide you with excellent care. Hearing back from our patients is one way we can continue to improve our services. Please take a few minutes to complete the written survey that you may receive in the mail after you visit with us. Thank you!        Patient Information     Date Of Birth          11/3/1932        Designated Caregiver       Most Recent Value    Caregiver    Will someone help with your care after discharge? yes    Name of designated caregiver Roberta Velasquez     Phone number of caregiver [phone]    Caregiver address same as patient.      About your hospital stay     You were admitted on:  July 23, 2018 You last received care in the:  [facility] [facility] [address] [facility]    You were discharged on:  July 30, 2018        Reason for your hospital stay       Evaluation of your low blood counts and dizziness. Additionally, you were noted to develop swelling and pain in your knee and foot which was thought to be due to gout and improved with steroids.                  Who to Call     For medical emergencies, please call 911.  For non-urgent questions about your medical care, please call your primary care provider or clinic, [phone]  For questions related to your surgery, please call your surgery clinic        Attending Provider     Provider Specialty    Trierweiler, Chad A, MD Emergency Medicine    Camron Welsh DO HOSPITALIST       Primary Care Provider Office Phone # Fax #    Karson Bishop [phone]      After Care Instructions     Activity       Your activity upon discharge: activity as tolerated            Diet       Follow this diet upon discharge: Regular                  Follow-up Appointments     Follow-up and recommended labs and tests         1. INR and hemoglobin check at Dr. Bishop's office on 7/31/18.  A lab appointment has been scheduled for you at 11:00am.  2. Follow up with Dr. Bishop in the next week.  An appointment was scheduled for you on Friday 8/3/18 at 2:30pm with Dr Bishop at [facility].                  Your next 10 appointments already scheduled     Jul 31, 2018 11:00 AM CDT   LAB with  LAB   [facility]    [address] [facility] [address] [phone]           Please do not eat 10-12 hours before your appointment if you are coming in fasting for labs on lipids, cholesterol, or glucose (sugar). This does not apply to pregnant women. Water, hot tea and black coffee (with nothing added) are okay. Do not drink other fluids, diet soda or chew gum.            Aug 03, 2018  2:30 PM CDT   Office Visit with Karson Bishop MD   [facility]    [address] [facility] [address] [phone]           Bring a current list of meds and any records pertaining to this visit. For Physicals, please bring immunization records and any forms needing to be filled out. Please arrive 10 minutes early to complete paperwork.            Aug 15, 2018 11:30 AM CDT   Anticoagulation Visit with  ANTICOAGULATION CLINIC   [facility]    [address] [facility] [address] [phone]              Additional Services     MED THERAPY MANAGE REFERRAL       Patient has diagnosis of Diabetes, Heart Failure, COPD, or AMI and is on more than 5 medications                  Warfarin Instruction     You have started taking a medicine called warfarin. This is a blood-thinning medicine (anticoagulant). It helps prevent and treat blood clots.      Before leaving the hospital, make sure you know how much to take and how long to take it.      You will need regular blood tests to make sure your blood is clotting safely. It  "is very important to see your doctor for regular blood tests.    Talk to your doctor before taking any new medicine (this includes over-the-counter drugs and herbal products). Many medicines can interact with warfarin. This may cause more bleeding or too much clotting.     Eating a lot of vitamin K--found in green, leafy vegetables--can change the way warfarin works in your body. Do NOT avoid these foods. Instead, try to eat the same amount each day.     Bleeding is the most common side-effect of warfarin. You may notice bleeding gums, a bloody nose, bruises and bleeding longer when you cut yourself. See a doctor at once if:   o You cough up blood  o You find blood in your stool (poop)  o You have a deep cut, or a cut that bleeds longer than 10 minutes   o You have a bad cut, hard fall, accident or hit your head (go to urgent care or the emergency room).    For women who can get pregnant: This medicine can harm an unborn baby. Be very careful not to get pregnant while taking this medicine. If you think you might be pregnant, call your doctor right away.    For more information, read \"Guide to Warfarin Therapy,  the booklet you received in the hospital.        Pending Results     No orders found from 7/21/2018 to 7/24/2018.            Statement of Approval     Ordered          07/30/18 0936  I have reviewed and agree with all the recommendations and orders detailed in this document.  EFFECTIVE NOW     Approved and electronically signed by:  Echo Pineda DO             Admission Information     Date & Time Provider Department Dept. Phone    7/23/2018 Camron Welsh DO [place] [address] [facility] [phone]      Your Vitals Were     Blood Pressure Pulse Temperature Respirations Height Weight    110/47 108 98.8  F (37.1  C) (Oral) 20 1.778 m (5' 10\") 61.3 kg (135 lb 2.3 oz)    Pulse Oximetry BMI (Body Mass Index)                94% 19.39 kg/m2          Care EveryWhere ID     " This is your Care EveryWhere ID. This could be used by other organizations to access your [facility] medical records  [id number]        Equal Access to Services     YUE CORDERO : Hadii hakeem Leach, portillo miller, johnnycarlie reesemyla cummings, jf demetrisin hayaamartha de la pazcamila tan So [facility] [phone].    ATENCIÓN: Si habla español, tiene a randle disposición servicios gratuitos de asistencia lingüística. Llame al [phone].    We comply with applicable federal civil rights laws and [place] laws. We do not discriminate on the basis of race, color, national origin, age, disability, sex, sexual orientation, or gender identity.               Review of your medicines      START taking        Dose / Directions    acetaminophen 325 MG tablet   Commonly known as:  TYLENOL   Used for:  Right knee pain, unspecified chronicity        Dose:  650 mg   Take 2 tablets (650 mg) by mouth every 4 hours as needed for mild pain   Quantity:  100 tablet   Refills:  0       ferrous sulfate 325 (65 Fe) MG tablet   Commonly known as:  IRON        Dose:  325 mg   Take 1 tablet (325 mg) by mouth 2 times daily   Quantity:  60 tablet   Refills:  0       pantoprazole 40 MG EC tablet   Commonly known as:  PROTONIX        Dose:  40 mg   Start taking on:  7/31/2018   Take 1 tablet (40 mg) by mouth every morning (before breakfast)   Quantity:  30 tablet   Refills:  0       predniSONE 10 MG tablet   Commonly known as:  DELTASONE   Used for:  Right knee pain, unspecified chronicity   Notes to Patient:  Follow directions for tapering dose         Take 20mg (2 tabs) po daily for 5 days, then decrease to 10mg (1 tab) po daily for 5 days, then stop.   Quantity:  15 tablet   Refills:  0         CONTINUE these medicines which may have CHANGED, or have new prescriptions. If we are uncertain of the size of tablets/capsules you have at home, strength may be listed as something that might have changed.        Dose / Directions    metoprolol  succinate 100 MG 24 hr tablet   Commonly known as:  TOPROL-XL   This may have changed:  how much to take   Used for:  Benign essential hypertension        Dose:  50 mg   Take 0.5 tablets (50 mg) by mouth daily   Quantity:  90 tablet   Refills:  3         CONTINUE these medicines which have NOT CHANGED        Dose / Directions    albuterol 108 (90 Base) MCG/ACT Inhaler   Commonly known as:  PROAIR HFA/PROVENTIL HFA/VENTOLIN HFA   Used for:  Chronic obstructive pulmonary disease, unspecified COPD type (H)        Dose:  1-2 puff   Inhale 1-2 puffs into the lungs every 6 hours as needed for shortness of breath / dyspnea or wheezing   Quantity:  3 Inhaler   Refills:  5       atorvastatin 10 MG tablet   Commonly known as:  LIPITOR   Used for:  Mixed hyperlipidemia        Dose:  10 mg   Take 1 tablet (10 mg) by mouth daily   Quantity:  90 tablet   Refills:  3       camphor-menthol 0.5-0.5 % Lotn   Commonly known as:  DERMASARRA   Used for:  Pruritus        Apply a thin layer to itchy areas as often as desired   Quantity:  444 mL   Refills:  11       hydrOXYzine 25 MG tablet   Commonly known as:  ATARAX   Used for:  Chronic pruritus        Take 1 to 2 tablets (25-50 mg) by mouth every 6 hours as needed for itching   Quantity:  120 tablet   Refills:  1       tiotropium 18 MCG capsule   Commonly known as:  SPIRIVA HANDIHALER   Used for:  Chronic obstructive pulmonary disease, unspecified COPD type (H)        Dose:  18 mcg   Inhale 1 capsule (18 mcg) into the lungs daily   Quantity:  90 capsule   Refills:  3       triamcinolone 0.1 % cream   Commonly known as:  KENALOG        Apply topically 2 times daily as needed for irritation   Refills:  0       WARFARIN SODIUM PO        Dose:  5 mg   Take 5 mg by mouth daily   Refills:  0         STOP taking     RANITIDINE HCL PO                Where to get your medicines      These medications were sent to [id number] - [facility] [address] [facility]  [address]  [facility] [address]     Phone:  [phone]     ferrous sulfate 325 (65 Fe) MG tablet    metoprolol succinate 100 MG 24 hr tablet    pantoprazole 40 MG EC tablet    predniSONE 10 MG tablet         Some of these will need a paper prescription and others can be bought over the counter. Ask your nurse if you have questions.     You don't need a prescription for these medications     acetaminophen 325 MG tablet                Protect others around you: Learn how to safely use, store and throw away your medicines at www.disposemymeds.org.             Medication List: This is a list of all your medications and when to take them. Check marks below indicate your daily home schedule. Keep this list as a reference.      Medications           Morning Afternoon Evening Bedtime As Needed    acetaminophen 325 MG tablet   Commonly known as:  TYLENOL   Take 2 tablets (650 mg) by mouth every 4 hours as needed for mild pain   Last time this was given:  650 mg on 7/28/2018  2:06 PM                                   albuterol 108 (90 Base) MCG/ACT Inhaler   Commonly known as:  PROAIR HFA/PROVENTIL HFA/VENTOLIN HFA   Inhale 1-2 puffs into the lungs every 6 hours as needed for shortness of breath / dyspnea or wheezing                                   atorvastatin 10 MG tablet   Commonly known as:  LIPITOR   Take 1 tablet (10 mg) by mouth daily   Last time this was given:  10 mg on 7/29/2018  9:06 PM                                   camphor-menthol 0.5-0.5 % Lotn   Commonly known as:  DERMASARRA   Apply a thin layer to itchy areas as often as desired   Last time this was given:  7/24/2018 11:48 AM                                   ferrous sulfate 325 (65 Fe) MG tablet   Commonly known as:  IRON   Take 1 tablet (325 mg) by mouth 2 times daily                                      hydrOXYzine 25 MG tablet   Commonly known as:  ATARAX   Take 1 to 2 tablets (25-50 mg) by mouth every 6 hours as needed for itching   Last time this was  given:  25 mg on 7/30/2018  9:07 AM                                   metoprolol succinate 100 MG 24 hr tablet   Commonly known as:  TOPROL-XL   Take 0.5 tablets (50 mg) by mouth daily   Last time this was given:  50 mg on 7/30/2018  9:14 AM                                   pantoprazole 40 MG EC tablet   Commonly known as:  PROTONIX   Take 1 tablet (40 mg) by mouth every morning (before breakfast)   Start taking on:  7/31/2018   Last time this was given:  40 mg on 7/30/2018  6:31 AM                                   predniSONE 10 MG tablet   Commonly known as:  DELTASONE   Take 20mg (2 tabs) po daily for 5 days, then decrease to 10mg (1 tab) po daily for 5 days, then stop.   Last time this was given:  20 mg on 7/30/2018  9:07 AM   Notes to Patient:  Follow directions for tapering dose                                    tiotropium 18 MCG capsule   Commonly known as:  SPIRIVA HANDIHALER   Inhale 1 capsule (18 mcg) into the lungs daily                                   triamcinolone 0.1 % cream   Commonly known as:  KENALOG   Apply topically 2 times daily as needed for irritation   Last time this was given:  7/30/2018  9:04 AM                                   WARFARIN SODIUM PO   Take 5 mg by mouth daily   Last time this was given:  5 mg on 7/29/2018  7:00 PM

## 2018-07-23 NOTE — ED PROVIDER NOTES
"  History     Chief Complaint:  Bradycardia     HPI   Hermilo Velasquez is a 85 year old male who presents with bradycardia. The patient reports that he has been feeling \"woozy\" for the past few days as well as experiencing occasional hot flashes when standing. Patient decided to chest his vital signs at home today because of these symptoms and noticed that his vitals were abnormal with low heart rate in the 30's and hypotension at 38 systolic. Patient went to his primary clinic for evaluation of this, and when these vitals were confirmed, patient was sent here for evaluation and treatment. Patient reports that it has been more difficult to breathe as of late, but denies any recent chest pain, shortness of breath, palpations, leg swelling, syncope, lightheadedness, abdominal pain, or any other symptoms.     Allergies:  Gabapentin  Lidocaine  Lasix [Furosemide]  Penicillins     Medications:    Albuterol  Atorvastatin  Dermasarra  Allegra  Hydroxyzine  Metoprolol  Omeprazole  Prilosec  Spirivia  Coumadin     Past Medical History:    Aortic stenosis  Atrial fibrillation  Deep vein thrombosis  GERD  Heart murmur  Monoclonal gammopathy  Neuropathy  Hyperlipidemia  Malignant lymphomas  Right bundle branch block  Severe sepsis with acute organ dysfunction    Past Surgical History:    Appendectomy  Total knee arthroplasty  Esophagoscopy, Gastroscopy, Duodenoscopy, Combined  Herniorrhaphy  Right knee arthroscopy  Left tarsal ligament repair  Replace valve aortic  Bilateral rotator cuff repair  Spine surgery (x3)  Tonsillectomy  TURP    Family History:    Father-coronary artery disease, emphysema    Social History:  Smoking status: Former smoker  Alcohol use: Yes (occasionally)  Marital Status:        Review of Systems   Respiratory: Positive for shortness of breath. Negative for cough and chest tightness.    Cardiovascular: Negative for chest pain.   Gastrointestinal: Negative for nausea and vomiting.   Skin: " "Negative for color change.   Neurological: Negative for dizziness, weakness and headaches.   All other systems reviewed and are negative.    Physical Exam     Patient Vitals for the past 24 hrs:   BP Temp Temp src Pulse Heart Rate Resp SpO2 Height Weight   07/23/18 1946 131/57 97.6  F (36.4  C) Oral - 56 16 91 % 1.778 m (5' 10\") 61.3 kg (135 lb 2.3 oz)   07/23/18 1930 122/69 - - - 51 15 94 % - -   07/23/18 1900 125/74 - - - 64 30 94 % - -   07/23/18 1845 123/61 - - - 58 12 92 % - -   07/23/18 1830 113/88 - - - 52 18 95 % - -   07/23/18 1800 117/68 - - - 56 16 97 % - -   07/23/18 1745 147/79 - - - 50 12 97 % - -   07/23/18 1741 143/66 - - - 46 16 96 % - -   07/23/18 1730 - - - - 57 17 - - -   07/23/18 1715 - - - - 47 16 - - -   07/23/18 1711 - 97.6  F (36.4  C) Oral - - - - - 79.4 kg (175 lb)   07/23/18 1709 115/71 98.2  F (36.8  C) Oral 60 59 22 97 % 1.778 m (5' 10\") 78.5 kg (173 lb)         Physical Exam  Eye:  Pupils are equal, round, and reactive.  Extraocular movements intact.    ENT:  No rhinorrhea.  Moist mucus membranes.  Normal tongue and tonsil.    Cardiac:  Bradycardic with frequent PVCs.  No murmurs, gallops, or rubs.    Pulmonary:  Clear to auscultation bilaterally.  No wheezes, rales, or rhonchi.    Abdomen:  Positive bowel sounds.  Abdomen is soft and non-distended, without focal tenderness.    Musculoskeletal:  Normal movement of all extremities without evidence for deficit.  Minimal lower extremity edema.    Skin:  Warm and dry without rashes.    Neurologic:  Non-focal exam without asymmetric weakness or numbness.     Psychiatric:  Normal affect with appropriate interaction with examiner.      Emergency Department Course   ECG:  @ 1710  Indication: bradycardia  Vent. Rate 52 bpm. GA interval 184 ms. QRS duration 148 ms. QT/QTc 510/474 ms. P-R-T axis 73 6 50.   Sinus bradycardia with premature complexes with aberrant conduction. Right bundle branch block. Abnormal ECG.  No significant change when " compared to previous ECG from 8/8/16   Read @ 1710 by Dr. Trierweiler..    Imaging:  XR Chest 2 views  IMPRESSION: Flattening of the diaphragms. Sternal wires. Otherwise normal. No change.  Report per radiology.     Laboratory:  CBC:  WBC 7.7, HGB 7.8 (L), ,  BMP: Glucose 109 (H), Creatinine 1.42 (H), GFR 47 (L), Calcium 8.2 (L), otherwise WNL  (1715) Troponin I: <0.015  (1715) INR: 2.43 (H)  (1715) Magnesium: 2.1  (1715) Phosphorous: 3.9  (1715) Ferritin: 8 (L)  (1715) Iron and Iron binding capacity: Iron 15 (L), Iron Binding Cap 368, Iron Saturation 4 (L)    ABO/Rh type and screen: A+    Occult blood stool: negative     Emergency Department Course:  Nursing notes and vitals reviewed.  (1709) I performed an exam of the patient as documented above.    (1711) EKG take  (1711) Oral temperature 97.6F    EKG was done, interpretation as above.  Blood was drawn from the patient. This was sent for laboratory testing, findings above.   The patient was sent for a x-ray while in the emergency department, findings above.   Stool sample was obtained and sent for laboratory analysis, findings above.    Findings and plan explained to the patient who consents to admission.   (1829) I discussed the patient with Dr. Castro of the hospitalist service, who will admit the patient to a [facility] bed for further monitoring, evaluation, and treatment.    Impression & Plan    Medical Decision Making:  This delightful 85-year-old presents to us from his primary care clinic with concerns for unstable vital signs including severe bradycardia and hypotension.  The patient notes that for the last several days, he has felt very lightheaded, especially with standing.  He is also felt that his breathing has been somewhat compromised.  However, he notes that when he is at rest that he feels well.  He denies having any black stools.    On exam, the patient has a slower heart rate that ranges between the high 40s to the low 50s.  He does  have frequent PVCs.  He was never significantly hypotensive here.  He denies any chest pain.  His troponin is negative and I do not feel that there is any concern here for ischemia.  However, I do find that he is significantly anemic with a 3 point hemoglobin drop over the last 6 months.  He is anticoagulated for issues of a heart valve and a history of DVTs.    The patient has symptomatic anemia versus symptomatic bradycardia versus a combination of both.  The patient requires admission, and with this I spoke with  of the hospitalist service will admit the patient under inpatient status.  We elected to give the patient a unit of blood considering his low hemoglobin and history of coronary artery disease along with symptomatic anemia.  In the meantime, further workup will be needed to determine the cause of his hemoglobin dropped.  He is on metoprolol, and may simply need to have this stopped.  He will need close monitoring on telemetry overnight.  The patient is amenable to this plan and questions are answered.    Diagnosis:    ICD-10-CM   1. Symptomatic anemia D64.9   2. Bradycardia R00.1   3. Lightheadedness R42       Disposition:  Patient is admitted to a [facility] bed under the care of Dr. Castro .            I, Arturo Acevedo, am serving as a scribe on 7/23/2018 at 5:09 PM to personally document services performed by Dr. Trierweiler based on my observations and the provider's statements to me.       Arturo Acevedo  7/23/2018    EMERGENCY DEPARTMENT       Trierweiler, Chad A, MD  07/24/18 0045

## 2018-07-23 NOTE — IP AVS SNAPSHOT
[facility] [address] [facility]    [address] [place] [address]    Phone:  [phone]                                       After Visit Summary   7/23/2018    Hermilo Velasquez    MRN: [id number]           After Visit Summary Signature Page     I have received my discharge instructions, and my questions have been answered. I have discussed any challenges I see with this plan with the nurse or doctor.    ..........................................................................................................................................  Patient/Patient Representative Signature      ..........................................................................................................................................  Patient Representative Print Name and Relationship to Patient    ..................................................               ................................................  Date                                            Time    ..........................................................................................................................................  Reviewed by Signature/Title    ...................................................              ..............................................  Date                                                            Time

## 2018-07-23 NOTE — PROCEDURE: TREATMENT REGIMEN
Plan: Stressed importance that patient refrain from taking Omeprazole. Instead he should take Zantac (ranitidine) 2 pills twice daily for acid reflux symptoms.
Detail Level: Simple

## 2018-07-23 NOTE — ED NOTES
"[facility]  ED Nurse Handoff Report    ED Chief complaint: Bradycardia (Pt reports self checked HR at home to be in 30s, feels \"hot\" when he gets up and somewhat \"whoosy\".)      ED Diagnosis:   Final diagnoses:   Symptomatic anemia   Bradycardia   Lightheadedness       Code Status: Full Code    Allergies:   Allergies   Allergen Reactions     Gabapentin      Swelling       Lidocaine Blisters     Allergy to lidocaine ointment     Lasix [Furosemide] Rash     Penicillins Rash     \"broke out from injection\" 60 yrs ago         Activity level - Baseline/Home:  Independent    Activity Level - Current:   Stand with Assist     Needed?: No    Isolation: No  Infection: Not Applicable  Bariatric?: No    Vital Signs:   Vitals:    07/23/18 1741 07/23/18 1745 07/23/18 1800 07/23/18 1830   BP: 143/66 147/79 117/68 113/88   Pulse:       Resp: 16 12 16 18   Temp:       TempSrc:       SpO2: 96% 97% 97% 95%   Weight:       Height:           Cardiac Rhythm: ,   Cardiac  Cardiac Rhythm: Sinus bradycardia;Other (Comment) (Occassional PVC)    Pain level: 0-10 Pain Scale: 0    Is this patient confused?: No   [place] - Suicide Severity Rating Scale Completed?  Yes  If yes, what color did the patient score?  White    Patient Report: Initial Complaint:  Bradycardia  Focused Assessment: Patient complaints of feeling lightheaded, \"whoozy\" over last few days.  Today, checked heart rate at home, was in the 30s with BP also low.  Increased fatigue.  Heart rate in ED sinus kapil, rate 45-58; mainly appearing to stay in around 58.    Tests Performed:  Labs, EKG, Chest XR  Abnormal Results:  HGB 7.8  Treatments provided:  IV insertion, no medications or fluids given at this time.     Family Comments:  Spouse with patient.     OBS brochure/video discussed/provided to patient: N/A    ED Medications: Medications - No data to display    Drips infusing?:  No    For the majority of the shift this patient was Green. "   Interventions performed were monitor, provide comfort, reassess.    Severe Sepsis OR Septic Shock Diagnosis Present: No      ED NURSE PHONE NUMBER: [phone]

## 2018-07-23 NOTE — TELEPHONE ENCOUNTER
"Returned call to patient.     Symptoms:  Low BP x 24 hours, feeling \"hazy\".  Vomited x 1 this morning.   Denies pain, fever, diarrhea.   Denies weakness, confusion.   Patient took all morning meds today, including Rx Metoprolol.   Recent BP per [facility] records:  BP Readings from Last 3 Encounters:   05/18/18 131/69   03/26/18 128/62   03/19/18 123/59       Advised patient drink clear liquids.    Scheduled with the only available provider in clinic today.   Advised ED if symptoms worsen prior to appointment.     Patient stated understanding and agreement with advise/plan.    Evie VREA RN,BSN         "

## 2018-07-23 NOTE — PROGRESS NOTES
"  SUBJECTIVE:   Hermilo Velasquez is a 85 year old male who presents to clinic today for the following health issues:      Hypotension Follow-up      Outpatient blood pressures are being checked at home.  Results are 89/50.    Low Salt Diet: not monitoring salt    Amount of exercise or physical activity: 6-7 days/week for an average of 45-60 minutes- stays active with various activities    Problems taking medications regularly: No    Medication side effects: none    Diet: regular (no restrictions)        Current Medications:     Current Outpatient Prescriptions   Medication Sig Dispense Refill     albuterol (PROAIR HFA/PROVENTIL HFA/VENTOLIN HFA) 108 (90 Base) MCG/ACT Inhaler Inhale 1-2 puffs into the lungs every 6 hours as needed for shortness of breath / dyspnea or wheezing 3 Inhaler 5     atorvastatin (LIPITOR) 10 MG tablet Take 1 tablet (10 mg) by mouth daily 90 tablet 3     camphor-menthol (DERMASARRA) 0.5-0.5 % LOTN Apply a thin layer to itchy areas as often as desired 444 mL 11     fexofenadine (ALLEGRA) 60 MG tablet Take 3 tablets (180 mg) by mouth daily 60 tablet 1     hydrOXYzine (ATARAX) 25 MG tablet Take 1 to 2 tablets (25-50 mg) by mouth every 6 hours as needed for itching 120 tablet 1     metoprolol (TOPROL-XL) 100 MG 24 hr tablet Take 1 tablet (100 mg) by mouth daily 90 tablet 3     omeprazole (PRILOSEC) 20 MG CR capsule TAKE TWO CAPSULES BY MOUTH DAILY 180 capsule 0     tiotropium (SPIRIVA HANDIHALER) 18 MCG capsule Inhale 1 capsule (18 mcg) into the lungs daily 90 capsule 3     warfarin (COUMADIN) 5 MG tablet TAKE 1 TO 1.5 TABLETS (5 TO 7.5 MG) BY MOUTH DAILY. TAKE AS PRESCRIBED BY INR CLINIC. CURRENT DOSE IS 7.5 MG FOR 2 DAYS AND 5 MG FOR 5 DAYS. 140 tablet 0         Allergies:      Allergies   Allergen Reactions     Gabapentin      Swelling       Lidocaine Blisters     Allergy to lidocaine ointment     Lasix [Furosemide] Rash     Penicillins Rash     \"broke out from injection\" 60 yrs ago        "       Past Medical History:     Past Medical History:   Diagnosis Date     Aortic stenosis     Severe AS, 9/2015 AVR - [facility] TRIFECTA Bovine bioprosthesis 25MM TF-25A     Atrial fibrillation (H)     9/2015 Paroxysmal post op Afib - discharged on Warfarin and a beta blocker     Deep vein thrombosis (H)      GERD (gastroesophageal reflux disease)      Heart murmur      Monoclonal gammopathy     plasmacyte prominent causing monoclonal gammopathy     Need for SBE (subacute bacterial endocarditis) prophylaxis      Neuropathy      Other and unspecified hyperlipidemia      Other malignant lymphomas     non hodgkin's lymphoma     RBBB (right bundle branch block)      Severe sepsis with acute organ dysfunction (H) 11/16/2015     Unspecified hereditary and idiopathic peripheral neuropathy          Past Surgical History:     Past Surgical History:   Procedure Laterality Date     APPENDECTOMY       AS TOTAL KNEE ARTHROPLASTY       ESOPHAGOSCOPY, GASTROSCOPY, DUODENOSCOPY (EGD), COMBINED N/A 11/28/2015    Procedure: COMBINED ESOPHAGOSCOPY, GASTROSCOPY, DUODENOSCOPY (EGD);  Surgeon: Danis Castillo MD;  Location:  GI     HERNIA REPAIR  2006     HERNIORRHAPHY VENTRAL  4/17/2013    Procedure: HERNIORRHAPHY VENTRAL;  VENTRAL HERNIA REPAIR WITH MESH;  Surgeon: Patel Guzman MD;  Location:  OR     KNEE SURGERY      arthroscopic right knee surgery      REPAIR LIGAMENT ANKLE  2/23/2012    Procedure:REPAIR LIGAMENT ANKLE; LEFT TARSAL TUNNEL RELEASE OF KNOT OF ARIEL RELEASE; Surgeon:SAUL PUENTE; Location: OR     REPLACE VALVE AORTIC N/A 9/3/2015    Procedure: REPLACE VALVE AORTIC;  Surgeon: Antonino Mitchell MD;  Location:  OR     ROTATOR CUFF REPAIR RT/LT      bilateral     SPINE SURGERY      3 spine surgeries     TONSILLECTOMY       TURP           Family Medical History:     Family History   Problem Relation Age of Onset     C.A.D. Father      Emphysema Father      Melanoma No family hx of   "    Skin Cancer No family hx of          Social History:     Social History     Social History     Marital status:      Spouse name: N/A     Number of children: N/A     Years of education: N/A     Occupational History     Not on file.     Social History Main Topics     Smoking status: Former Smoker     Packs/day: 2.00     Years: 55.00     Quit date: 1/22/1998     Smokeless tobacco: Never Used     Alcohol use 0.0 oz/week     0 Standard drinks or equivalent per week      Comment: occassionaly     Drug use: No     Sexual activity: Not Currently     Partners: Female     Other Topics Concern     Caffeine Concern No     occ     Special Diet No     Exercise No     Seat Belt Yes     Social History Narrative    , [place]    Retired  - self employed           Review of System:     Constitutional: Negative for fever or chills  Skin: Negative for rashes  Ears/Nose/Throat: Negative for nasal congestion, sore throat  Respiratory: No shortness of breath, dyspnea on exertion, cough, or hemoptysis  Cardiovascular: Negative for chest pain, positive for bradycardia   Gastrointestinal: Negative for nausea, vomiting  Genitourinary: Negative for dysuria, hematuria  Musculoskeletal: Negative for myalgias  Neurologic: Negative for headaches, positive for hearing loss, positive for dizziness  Psychiatric: Negative for depression, anxiety  Hematologic/Lymphatic/Immunologic: Negative  Endocrine: Negative  Behavioral: Negative for tobacco use       Physical Exam:   BP (!) 90/30 (BP Location: Left arm, Patient Position: Sitting, Cuff Size: Adult Regular)  Pulse (!) 35  Temp 97.8  F (36.6  C) (Oral)  Ht 5' 10\" (1.778 m)  Wt 174 lb (78.9 kg)  SpO2 97%  BMI 24.97 kg/m2    GENERAL: chronically ill appearing elderly male, alert and no distress  EYES: eyes grossly normal to inspection, and conjunctivae and sclerae normal  HENT: Normocephalic atraumatic. Nose and mouth without ulcers or " lesions  NECK: supple  RESP: lungs clear to auscultation   CV: bradycardiac  LYMPH: no peripheral edema   ABDOMEN: nondistended  MS: no gross musculoskeletal defects noted  SKIN: no suspicious lesions or rashes  NEURO: Alert & Oriented x 3. Patient is very hard of hearing.  PSYCH: mentation appears normal, affect normal        Diagnostic Test Results:     Diagnostic Test Results:  Results for orders placed or performed in visit on 07/05/18   INR point of care   Result Value Ref Range    INR Protime 2.1 (A) 0.86 - 1.14       ASSESSMENT/PLAN:     (R42) Dizziness  (R00.1) Bradycardia  (primary encounter diagnosis)  (I95.1) Orthostatic hypotension  Comment: patient's vital signs are not stable with hypotension and bradycardia  Plan: Given the patient's current hypotension and bradycardia with dizziness, I have decided to transfer the patient to the [facility] ER today for further evaluation and management going forward.      Follow Up Plan:     Patient will be transferred from the Internal Medicine clinic to the [facility] ER today for further evaluation and management going forward.        Liv Mc MD  Internal Medicine  [facility]

## 2018-07-23 NOTE — PROCEDURE: COUNSELING
Detail Level: Zone
Patient Specific Counseling (Will Not Stick From Patient To Patient): Drug rash appears resolved. Remaining itch on back is most likely caused by multiple seborrheic keratoses.
Patient Specific Counseling (Will Not Stick From Patient To Patient): He may continue Claritin once daily, hydroxyzine at night and use of Triamcinolone cream twice daily as needed for itch.

## 2018-07-24 ENCOUNTER — APPOINTMENT (OUTPATIENT)
Dept: CARDIOLOGY | Facility: CLINIC | Age: 83
DRG: 812 | End: 2018-07-24
Attending: HOSPITALIST
Payer: COMMERCIAL

## 2018-07-24 LAB
ANION GAP SERPL CALCULATED.3IONS-SCNC: 8 MMOL/L (ref 3–14)
ANISOCYTOSIS BLD QL SMEAR: SLIGHT
BASOPHILS # BLD AUTO: 0 10E9/L (ref 0–0.2)
BASOPHILS NFR BLD AUTO: 0.3 %
BUN SERPL-MCNC: 17 MG/DL (ref 7–30)
CALCIUM SERPL-MCNC: 7.8 MG/DL (ref 8.5–10.1)
CHLORIDE SERPL-SCNC: 111 MMOL/L (ref 94–109)
CO2 SERPL-SCNC: 24 MMOL/L (ref 20–32)
CREAT SERPL-MCNC: 1.26 MG/DL (ref 0.66–1.25)
DIFFERENTIAL METHOD BLD: ABNORMAL
ELLIPTOCYTES BLD QL SMEAR: SLIGHT
EOSINOPHIL # BLD AUTO: 0.8 10E9/L (ref 0–0.7)
EOSINOPHIL NFR BLD AUTO: 10.8 %
ERYTHROCYTE [DISTWIDTH] IN BLOOD BY AUTOMATED COUNT: 17.3 % (ref 10–15)
GFR SERPL CREATININE-BSD FRML MDRD: 54 ML/MIN/1.7M2
GLUCOSE SERPL-MCNC: 83 MG/DL (ref 70–99)
HCT VFR BLD AUTO: 24.4 % (ref 40–53)
HGB BLD-MCNC: 7.2 G/DL (ref 13.3–17.7)
HGB BLD-MCNC: 7.6 G/DL (ref 13.3–17.7)
IMM GRANULOCYTES # BLD: 0 10E9/L (ref 0–0.4)
IMM GRANULOCYTES NFR BLD: 0.1 %
INR PPP: 2.34 (ref 0.86–1.14)
LYMPHOCYTES # BLD AUTO: 1 10E9/L (ref 0.8–5.3)
LYMPHOCYTES NFR BLD AUTO: 14.9 %
MCH RBC QN AUTO: 19.7 PG (ref 26.5–33)
MCHC RBC AUTO-ENTMCNC: 29.5 G/DL (ref 31.5–36.5)
MCV RBC AUTO: 67 FL (ref 78–100)
MONOCYTES # BLD AUTO: 0.7 10E9/L (ref 0–1.3)
MONOCYTES NFR BLD AUTO: 10.3 %
NEUTROPHILS # BLD AUTO: 4.4 10E9/L (ref 1.6–8.3)
NEUTROPHILS NFR BLD AUTO: 63.6 %
NRBC # BLD AUTO: 0 10*3/UL
NRBC BLD AUTO-RTO: 0 /100
NT-PROBNP SERPL-MCNC: 4717 PG/ML (ref 0–1800)
PLATELET # BLD AUTO: 201 10E9/L (ref 150–450)
PLATELET # BLD EST: ABNORMAL 10*3/UL
POTASSIUM SERPL-SCNC: 4.4 MMOL/L (ref 3.4–5.3)
RBC # BLD AUTO: 3.66 10E12/L (ref 4.4–5.9)
RBC INCLUSIONS BLD: SLIGHT
SODIUM SERPL-SCNC: 143 MMOL/L (ref 133–144)
TARGETS BLD QL SMEAR: SLIGHT
WBC # BLD AUTO: 7 10E9/L (ref 4–11)

## 2018-07-24 PROCEDURE — 85018 HEMOGLOBIN: CPT | Performed by: HOSPITALIST

## 2018-07-24 PROCEDURE — 25000132 ZZH RX MED GY IP 250 OP 250 PS 637: Performed by: HOSPITALIST

## 2018-07-24 PROCEDURE — 99207 ZZC CDG-MDM COMPONENT: MEETS LOW - DOWN CODED: CPT | Performed by: HOSPITALIST

## 2018-07-24 PROCEDURE — 36415 COLL VENOUS BLD VENIPUNCTURE: CPT | Performed by: HOSPITALIST

## 2018-07-24 PROCEDURE — 80048 BASIC METABOLIC PNL TOTAL CA: CPT | Performed by: HOSPITALIST

## 2018-07-24 PROCEDURE — 83880 ASSAY OF NATRIURETIC PEPTIDE: CPT | Performed by: HOSPITALIST

## 2018-07-24 PROCEDURE — 85610 PROTHROMBIN TIME: CPT | Performed by: HOSPITALIST

## 2018-07-24 PROCEDURE — 93306 TTE W/DOPPLER COMPLETE: CPT

## 2018-07-24 PROCEDURE — 25000125 ZZHC RX 250: Performed by: HOSPITALIST

## 2018-07-24 PROCEDURE — 25000125 ZZHC RX 250: Performed by: PHYSICIAN ASSISTANT

## 2018-07-24 PROCEDURE — 25000128 H RX IP 250 OP 636: Performed by: HOSPITALIST

## 2018-07-24 PROCEDURE — 99232 SBSQ HOSP IP/OBS MODERATE 35: CPT | Performed by: HOSPITALIST

## 2018-07-24 PROCEDURE — 85025 COMPLETE CBC W/AUTO DIFF WBC: CPT | Performed by: HOSPITALIST

## 2018-07-24 PROCEDURE — 25500064 ZZH RX 255 OP 636: Performed by: HOSPITALIST

## 2018-07-24 PROCEDURE — 93306 TTE W/DOPPLER COMPLETE: CPT | Mod: 26 | Performed by: INTERNAL MEDICINE

## 2018-07-24 PROCEDURE — 12000000 ZZH R&B MED SURG/OB

## 2018-07-24 RX ADMIN — TRIAMCINOLONE ACETONIDE: 1 CREAM TOPICAL at 09:13

## 2018-07-24 RX ADMIN — ATORVASTATIN CALCIUM 10 MG: 10 TABLET, FILM COATED ORAL at 21:13

## 2018-07-24 RX ADMIN — TRIAMCINOLONE ACETONIDE: 1 CREAM TOPICAL at 21:13

## 2018-07-24 RX ADMIN — SODIUM CHLORIDE: 9 INJECTION, SOLUTION INTRAVENOUS at 19:55

## 2018-07-24 RX ADMIN — HYDROXYZINE HYDROCHLORIDE 25 MG: 25 TABLET, FILM COATED ORAL at 10:03

## 2018-07-24 RX ADMIN — HYDROXYZINE HYDROCHLORIDE 25 MG: 25 TABLET, FILM COATED ORAL at 16:46

## 2018-07-24 RX ADMIN — MENTHOL, CAMPHOR: 1.11; 1.11 LOTION TOPICAL at 11:48

## 2018-07-24 RX ADMIN — SODIUM CHLORIDE: 9 INJECTION, SOLUTION INTRAVENOUS at 07:20

## 2018-07-24 RX ADMIN — UMECLIDINIUM 62.5 MCG: 62.5 AEROSOL, POWDER ORAL at 09:16

## 2018-07-24 RX ADMIN — HUMAN ALBUMIN MICROSPHERES AND PERFLUTREN 9 ML: 10; .22 INJECTION, SOLUTION INTRAVENOUS at 11:22

## 2018-07-24 RX ADMIN — FAMOTIDINE 20 MG: 10 INJECTION INTRAVENOUS at 10:03

## 2018-07-24 RX ADMIN — PANTOPRAZOLE SODIUM 40 MG: 40 INJECTION, POWDER, FOR SOLUTION INTRAVENOUS at 11:39

## 2018-07-24 ASSESSMENT — ACTIVITIES OF DAILY LIVING (ADL)
ADLS_ACUITY_SCORE: 9

## 2018-07-24 NOTE — PLAN OF CARE
Problem: Patient Care Overview  Goal: Plan of Care/Patient Progress Review  Outcome: No Change  VSS on RA, still bradycardic but BP stable. On tele. No complaints overnight. No signs of bleeding either. Last Hb 7.8. GI consult pending. Echo also ordered. Nursing will continue to monitor.

## 2018-07-24 NOTE — PLAN OF CARE
Problem: Patient Care Overview  Goal: Plan of Care/Patient Progress Review  Outcome: No Change  Pt is A&Ox4, up with SBA, VSS on RA, denied pain, c/o itchiness to rash on back, PRN Atarax and lotions given with some relief. GI consulted, Plan for NPO at 0000 for possible EGD tomorrow, INR will need to be below 1.5. Hgb 7.2 this AM, recheck at 1800. Blood consent signed, in chart. Tele SB BBB w/ 1* AVB. No BM this shift, Occult lab test cancelled. BS+, Flatus+, Nontender abdomen. Discharge 1-2 days.

## 2018-07-24 NOTE — PROGRESS NOTES
[facility]    Hospitalist Progress Note  Name: Hermilo Velasquez    MRN: [id number]  Provider:  Alonso Mares DO, MPH  Date of Service: 07/24/2018    Summary of Stay: Hermilo Velasquez is a 85 year old male who presents with dizziness for 1 week, found to have low blood pressure and acute worsening of his anemia     Relative hypotension, bradycardia: Underlying rhythm is sinus bradycardia. Improved with fluids and no obvious infection present.  - Hold blood presssure medications  - Work up anemia  - Hold metoprolol  - Telemetry and electrolyte evaluations     Acute on chronic anemia from iron deficiency: Drop from 10.7 and now down to 7s, acute portion likely hemodilution from IVF and volume expansion. Has become microcytic, iron studies consistent with Fe deficiency. No obvious source of bleeding but he is on warfarin. Presumably chronic smolderin GIB, last colonoscopy was 5 years ago and had polyps. Also has severe heartburn, they have been discontinued on the PPI because of allergy by the allergist and so has been taking zantac.  - Type and screen  - Consult GI to consider scope, consider upper given the reflex symptoms  - Allergy to lansoprazole per pateint, severe skin itching so will schedule pepcid IV  - Does not appear actively bleeding, does not need PPI infusion at this time  - Conditional PRBC unit if Hb <7, recheck at 1800 (already consented)     Acute kidney injury: Appears prerenal as improving with IVF  - AM BMP     History of DVT/PE  - Hold coumadin, consider reversal if GI considering EGD or colonoscopy  - will not reverse at this time     H/o Aortive valve replacement, bioprosthetic     GERD  - H2 blocker     History of COPD: CXR clear. No fever. No exacerbation, lungs clear on exam.  - TTE  - BNP  - continue home meds     Pruritus and chronic back rash:  - Resumed creams and vistaril  - Outpatient derm referral    DVT Prophylaxis: Pneumatic Compression Devices  Code Status: Full  Code  Disposition: Expected discharge in 1-2 days to home. Goals prior to discharge include work up as noted above.   Incidental Findings: None.  Family updated today: No     Interval History   Assumed care from previous hospitalist. The history was fully reviewed.  The patient reports doing well. No dizziness. No chest pain or shortness of breath. No nausea, vomiting, diarrhea, constipation. No fevers. No other specific complaints identified.     -Data reviewed today: I personally reviewed all new labs and imaging results over the last 24 hours.     Physical Exam   Temp: 98.4  F (36.9  C) Temp src: Oral BP: 137/66 Pulse: 60 Heart Rate: 54 Resp: 16 SpO2: 92 % O2 Device: None (Room air)    Vitals:    07/23/18 1709 07/23/18 1711 07/23/18 1946   Weight: 78.5 kg (173 lb) 79.4 kg (175 lb) 61.3 kg (135 lb 2.3 oz)     Vital Signs with Ranges  Temp:  [97.6  F (36.4  C)-98.4  F (36.9  C)] 98.4  F (36.9  C)  Pulse:  [35-60] 60  Heart Rate:  [46-64] 54  Resp:  [12-30] 16  BP: ()/(30-88) 137/66  SpO2:  [91 %-97 %] 92 %  I/O last 3 completed shifts:  In: 240 [P.O.:240]  Out: 1075 [Urine:1075]    GENERAL: No apparent distress. Awake, alert, and fully oriented.  HEENT: Normocephalic, atraumatic. Extraocular movements intact.  CARDIOVASCULAR: Regular rate and rhythm without murmurs or rubs. No S3.  PULMONARY: Clear bilaterally.  GASTROINTESTINAL: Soft, non-tender, non-distended. Bowel sounds normoactive.   EXTREMITIES: No cyanosis or clubbing. No edema.  NEUROLOGICAL: CN 2-12 grossly intact, no focal neurological deficits.  DERMATOLOGICAL: No rash, ulcer, bruising, nor jaundice.     Medications     sodium chloride 100 mL/hr at 07/24/18 0720       atorvastatin  10 mg Oral At Bedtime     famotidine  20 mg Intravenous Q12H     umeclidinium  62.5 mcg Inhalation Daily     Data     Laboratory:    Recent Labs  Lab 07/24/18  0805 07/23/18  1715   WBC 7.0 7.7   HGB 7.2* 7.8*   HCT 24.4* 27.3*   MCV 67* 67*    202       Recent  Labs  Lab 07/24/18  0805 07/23/18  1715    140   POTASSIUM 4.4 4.4   CHLORIDE 111* 107   CO2 24 26   ANIONGAP 8 7   GLC 83 109*   BUN 17 24   CR 1.26* 1.42*   GFRESTIMATED 54* 47*   GFRESTBLACK 66 57*   AMRITA 7.8* 8.2*     No results for input(s): CULT in the last 168 hours.    Imaging:  Recent Results (from the past 24 hour(s))   XR Chest 2 Views    Narrative    CHEST TWO VIEWS  7/23/2018 5:39 PM     HISTORY: Chest pain.    COMPARISON: 2/12/2018      Impression    IMPRESSION: Flattening of the diaphragms. Sternal wires. Otherwise  normal. No change.    MD Alonso VENEGAS DO MPH  [facility] Hospitalist  [address] [place] [address]  Pager: [phone]  07/24/2018

## 2018-07-24 NOTE — H&P
[facility]    History and Physical  Hospitalist       Date of Admission:  7/23/2018    Assessment & Plan   Hermilo Velasquez is a 85 year old male who presents with dizziness for 1 week, found to have low blood pressure and acute worsening of his anemia    Relative hypotension, bradycardia  Underlying rhythm is sinus bradycardia  Improved with fluids\no obvious infection present  - hold blood presssure medications  - work up anemia  - hold metoprolol  - telemetry and electrolyte evaluations    Acute on chronic anemia from iron deficiency  Drop from 10.7   Has become microcytic, iron studies consistent with fe deficiency  No obvious source of bleeding, on warfarin  Presumably GI, last colon was 5 years ago and had polyps  Also has severe heartburn, they have been discontinued on the PPI because of allergy by the allergist and so has been taking zantac  - type and screen, hemoccult  - consult GI to consider scope, consider upper given the reflex sxs  - allergy to lansoprazole per pateint, severe skin itching so will schedule pepcid IV  - does not appear actively bleeding, does not need PPI infusion at this time    Acute kidney injury  Likely prerenal  - fluids and recheck in the AM    History of DVT/PE  - will hold coumadin, consider reversal if GI considering EGD or colonoscopy  - will not reverse at this time    H/o Aortive valve replacement, bioprosthetic  - noted    GERD  - zantac    Cough  History of COPD  CXR clear  - echocardiogram, bnp  - monitor  - continue home meds    Pruritus  - resumed home medications      DVT Prophylaxis: Pneumatic Compression Devices  Code Status: Full Code    Disposition: Expected discharge in 2-3 days once workup complete.    Camron Welsh DO    Primary Care Physician   Karson Bishop    Chief Complaint   dizziness    History is obtained from the patient and wife    History of Present Illness   Hermilo Velasquez is a 85 year old male who presents with  lightheadedness, low heart rate, and low blood pressure. He has been feeling this way for a week, domenico with activty, rest makes it better. Seems to be worsening as time goes on. He went to PCP today and was sent to the ED for low blood pressure. Has been having a cough and some swelling in his extremities. He denies any other symptoms, no med changes. No blood loss that he knows of. Uses a cane to walk around, lives independent with wife.    In the ED found to have BP of 90/30 and SB into the 50s. Improved with fluids. Found to have 3 gram drop of his hemoglobin. He is on warfairn.     Past Medical History    I have reviewed this patient's medical history and updated it with pertinent information if needed.   Past Medical History:   Diagnosis Date     Aortic stenosis     Severe AS, 9/2015 AVR - [facility] TRIFECTA Bovine bioprosthesis 25MM TF-25A     Atrial fibrillation (H)     9/2015 Paroxysmal post op Afib - discharged on Warfarin and a beta blocker     Deep vein thrombosis (H)      GERD (gastroesophageal reflux disease)      Heart murmur      Monoclonal gammopathy     plasmacyte prominent causing monoclonal gammopathy     Need for SBE (subacute bacterial endocarditis) prophylaxis      Neuropathy      Other and unspecified hyperlipidemia      Other malignant lymphomas     non hodgkin's lymphoma     RBBB (right bundle branch block)      Severe sepsis with acute organ dysfunction (H) 11/16/2015     Unspecified hereditary and idiopathic peripheral neuropathy        Past Surgical History   I have reviewed this patient's surgical history and updated it with pertinent information if needed.  Past Surgical History:   Procedure Laterality Date     APPENDECTOMY       AS TOTAL KNEE ARTHROPLASTY       ESOPHAGOSCOPY, GASTROSCOPY, DUODENOSCOPY (EGD), COMBINED N/A 11/28/2015    Procedure: COMBINED ESOPHAGOSCOPY, GASTROSCOPY, DUODENOSCOPY (EGD);  Surgeon: Danis Castillo MD;  Location:  GI     HERNIA REPAIR  2006      HERNIORRHAPHY VENTRAL  4/17/2013    Procedure: HERNIORRHAPHY VENTRAL;  VENTRAL HERNIA REPAIR WITH MESH;  Surgeon: Patel Guzman MD;  Location: [facility] OR     KNEE SURGERY      arthroscopic right knee surgery      REPAIR LIGAMENT ANKLE  2/23/2012    Procedure:REPAIR LIGAMENT ANKLE; LEFT TARSAL TUNNEL RELEASE OF KNOT OF ARIEL RELEASE; Surgeon:SAUL PUENTE; Location:[facility] OR     REPLACE VALVE AORTIC N/A 9/3/2015    Procedure: REPLACE VALVE AORTIC;  Surgeon: Antonino Mitchell MD;  Location: [facility] OR     ROTATOR CUFF REPAIR RT/LT      bilateral     SPINE SURGERY      3 spine surgeries     TONSILLECTOMY       TURP         Prior to Admission Medications   Prior to Admission Medications   Prescriptions Last Dose Informant Patient Reported? Taking?   RANITIDINE HCL PO 7/23/2018 at AM Self Yes Yes   Sig: Take 150 mg by mouth 2 times daily   WARFARIN SODIUM PO 7/23/2018 at AM Self Yes Yes   Sig: Take 5 mg by mouth daily   albuterol (PROAIR HFA/PROVENTIL HFA/VENTOLIN HFA) 108 (90 Base) MCG/ACT Inhaler PRN Self No Yes   Sig: Inhale 1-2 puffs into the lungs every 6 hours as needed for shortness of breath / dyspnea or wheezing   atorvastatin (LIPITOR) 10 MG tablet 7/22/2018 at HS Self No Yes   Sig: Take 1 tablet (10 mg) by mouth daily   camphor-menthol (DERMASARRA) 0.5-0.5 % LOTN PRN Self No Yes   Sig: Apply a thin layer to itchy areas as often as desired   hydrOXYzine (ATARAX) 25 MG tablet PRN Self No Yes   Sig: Take 1 to 2 tablets (25-50 mg) by mouth every 6 hours as needed for itching   metoprolol (TOPROL-XL) 100 MG 24 hr tablet 7/23/2018 at AM Self No Yes   Sig: Take 1 tablet (100 mg) by mouth daily   tiotropium (SPIRIVA HANDIHALER) 18 MCG capsule 7/23/2018 at AM Self No Yes   Sig: Inhale 1 capsule (18 mcg) into the lungs daily   triamcinolone (KENALOG) 0.1 % cream PRN Self Yes Yes   Sig: Apply topically 2 times daily as needed for irritation      Facility-Administered Medications: None     Allergies  "  Allergies   Allergen Reactions     Gabapentin      Swelling       Lidocaine Blisters     Allergy to lidocaine ointment     Lasix [Furosemide] Rash     Penicillins Rash     \"broke out from injection\" 60 yrs ago         Social History   I have reviewed this patient's social history and updated it with pertinent information if needed. Hermilo Velasquez  reports that he quit smoking about 20 years ago. He has a 110.00 pack-year smoking history. He has never used smokeless tobacco. He reports that he drinks alcohol. He reports that he does not use illicit drugs.    Family History   I have reviewed this patient's family history and updated it with pertinent information if needed.   Family History   Problem Relation Age of Onset     C.A.D. Father      Emphysema Father      Melanoma No family hx of      Skin Cancer No family hx of        Review of Systems   The 10 point Review of Systems is negative other than noted in the HPI or here.     Physical Exam   Temp: 97.6  F (36.4  C) Temp src: Oral BP: 131/57 Pulse: 60 Heart Rate: 56 Resp: 16 SpO2: 91 % O2 Device: None (Room air)    Vital Signs with Ranges  Temp:  [97.6  F (36.4  C)-98.2  F (36.8  C)] 97.6  F (36.4  C)  Pulse:  [35-60] 60  Heart Rate:  [46-64] 56  Resp:  [12-30] 16  BP: ()/(30-88) 131/57  SpO2:  [91 %-97 %] 91 %  135 lbs 2.27 oz    Constitutional: Awake, alert, cooperative, no apparent distress.  Eyes: Conjunctiva and pupils examined and normal.  HEENT: Moist mucous membranes, normal dentition.  Respiratory: Clear to auscultation bilaterally, no crackles or wheezing. Sternal scar noted  Cardiovascular: Regular rate and rhythm, normal S1 and S2, and no murmur noted.  GI: Soft, non-distended, non-tender, normal bowel sounds.  Lymph/Hematologic: No anterior cervical or supraclavicular adenopathy.  Skin: No rashes, no cyanosis, no edema.  Musculoskeletal: No joint swelling, erythema or tenderness.  Psychiatric: Alert, oriented to person, place and time, no " obvious anxiety or depression.    Data   Data reviewed today:  I personally reviewed the EKG tracing showing sinus kapil wtih bigemity and the chest x-ray image(s) showing flattened diaphargm but clear lungs.    Recent Labs  Lab 07/23/18  1715   WBC 7.7   HGB 7.8*   MCV 67*      INR 2.43*      POTASSIUM 4.4   CHLORIDE 107   CO2 26   BUN 24   CR 1.42*   ANIONGAP 7   AMRITA 8.2*   *   TROPI <0.015       Imaging:  Recent Results (from the past 24 hour(s))   XR Chest 2 Views    Narrative    CHEST TWO VIEWS  7/23/2018 5:39 PM     HISTORY: Chest pain.    COMPARISON: 2/12/2018      Impression    IMPRESSION: Flattening of the diaphragms. Sternal wires. Otherwise  normal. No change.    RAI BLOOD MD

## 2018-07-24 NOTE — ED NOTES
Per Dr Trierweiler, patient ok to go up to admission room with ERT only (no RN) & no cardiac monitoring required for transfer.

## 2018-07-24 NOTE — CONSULTS
GASTROENTEROLOGY CONSULTATION     Hermilo Velasquez   [address] [id number]   85 year old male   Admission Date/Time: 7/23/2018   Encounter Date: 7/24/2018  Primary Care Provider: Karson Bishop     Referring / Attending Physician: Camron Welsh   We were asked to see the patient in consultation by Dr. Welsh for evaluation of iron deficiency anemia.     HPI: Hermilo Velasquez is a 85 year old male with a past medical history significant for severe aortic stenosis status post bovine bioprosthesis in 2015 paroxysmal A. fib, DVT, GERD, non-Hodgkin's lymphoma and monoclonal gammopathy who presented to his primary care provider for evaluation of weakness and dizziness.  He was found to have hypotension so he was directed to the emergency department.  In the emergency department he was found to be bradycardic but had normal blood pressures.  He was noted to have a hemoglobin of 7.8 which is down about 3 g over the last 6 months.  The studies were checked which showed serum iron of 15 with an iron-binding capacity of 368 and iron saturation of 4.  INR was therapeutic at 2.48.  The patient was admitted to the floor for further evaluation.  The patient denies any change in bowel habits.  The patient denies any constipation, diarrhea, hematochezia or melena.  His appetite has been slightly decreased but he denies any weight loss.  He states that his Prevacid was discontinued earlier this year because he developed hives and pimples on his back.  He states that he had been on this medication for about a decade but one of his physicians thought this was a potential allergic reaction.  He has since been taking omeprazole over-the-counter.  He has noticed an increase in heartburn and regurgitation since the Prevacid was discontinued.  He denies any dysphagia or odynophagia.    Past Medical History  Past Medical History:   Diagnosis Date     Aortic stenosis     Severe AS, 9/2015 AVR - [facility]  TRIFECTA Bovine bioprosthesis 25MM TF-25A     Atrial fibrillation (H)     9/2015 Paroxysmal post op Afib - discharged on Warfarin and a beta blocker     Deep vein thrombosis (H)      GERD (gastroesophageal reflux disease)      Heart murmur      Monoclonal gammopathy     plasmacyte prominent causing monoclonal gammopathy     Need for SBE (subacute bacterial endocarditis) prophylaxis      Neuropathy      Other and unspecified hyperlipidemia      Other malignant lymphomas     non hodgkin's lymphoma     RBBB (right bundle branch block)      Severe sepsis with acute organ dysfunction (H) 11/16/2015     Unspecified hereditary and idiopathic peripheral neuropathy        Past Surgical History  Past Surgical History:   Procedure Laterality Date     APPENDECTOMY       AS TOTAL KNEE ARTHROPLASTY       ESOPHAGOSCOPY, GASTROSCOPY, DUODENOSCOPY (EGD), COMBINED N/A 11/28/2015    Procedure: COMBINED ESOPHAGOSCOPY, GASTROSCOPY, DUODENOSCOPY (EGD);  Surgeon: Danis Castillo MD;  Location:  GI     HERNIA REPAIR  2006     HERNIORRHAPHY VENTRAL  4/17/2013    Procedure: HERNIORRHAPHY VENTRAL;  VENTRAL HERNIA REPAIR WITH MESH;  Surgeon: Patel Guzman MD;  Location:  OR     KNEE SURGERY      arthroscopic right knee surgery      REPAIR LIGAMENT ANKLE  2/23/2012    Procedure:REPAIR LIGAMENT ANKLE; LEFT TARSAL TUNNEL RELEASE OF KNOT OF ARIEL RELEASE; Surgeon:SAUL PUENTE; Location: OR     REPLACE VALVE AORTIC N/A 9/3/2015    Procedure: REPLACE VALVE AORTIC;  Surgeon: Antonino Mitchell MD;  Location:  OR     ROTATOR CUFF REPAIR RT/LT      bilateral     SPINE SURGERY      3 spine surgeries     TONSILLECTOMY       TURP         Family History  Family History   Problem Relation Age of Onset     C.A.D. Father      Emphysema Father      Melanoma No family hx of      Skin Cancer No family hx of        Social History  Social History     Social History     Marital status:      Spouse name: N/A     Number  of children: N/A     Years of education: N/A     Occupational History     Not on file.     Social History Main Topics     Smoking status: Former Smoker     Packs/day: 2.00     Years: 55.00     Quit date: 1/22/1998     Smokeless tobacco: Never Used     Alcohol use 0.0 oz/week     0 Standard drinks or equivalent per week      Comment: occassionaly     Drug use: No     Sexual activity: Not Currently     Partners: Female     Other Topics Concern     Caffeine Concern No     occ     Special Diet No     Exercise No     Seat Belt Yes     Social History Narrative    , [place]    Retired  - self employed       Medications  Prior to Admission medications    Medication Sig Start Date End Date Taking? Authorizing Provider   albuterol (PROAIR HFA/PROVENTIL HFA/VENTOLIN HFA) 108 (90 Base) MCG/ACT Inhaler Inhale 1-2 puffs into the lungs every 6 hours as needed for shortness of breath / dyspnea or wheezing 5/15/18  Yes Karson Bishop MD   atorvastatin (LIPITOR) 10 MG tablet Take 1 tablet (10 mg) by mouth daily 2/13/18  Yes Tab Grijalva MD   camphor-menthol (DERMASARRA) 0.5-0.5 % LOTN Apply a thin layer to itchy areas as often as desired 5/29/18  Yes En Aviles MD   hydrOXYzine (ATARAX) 25 MG tablet Take 1 to 2 tablets (25-50 mg) by mouth every 6 hours as needed for itching 7/6/18  Yes Karson iBshop MD   metoprolol (TOPROL-XL) 100 MG 24 hr tablet Take 1 tablet (100 mg) by mouth daily 1/10/18  Yes Karson Bishop MD   RANITIDINE HCL PO Take 150 mg by mouth 2 times daily   Yes Unknown, Entered By History   tiotropium (SPIRIVA HANDIHALER) 18 MCG capsule Inhale 1 capsule (18 mcg) into the lungs daily 5/10/18  Yes Karson Bishop MD   triamcinolone (KENALOG) 0.1 % cream Apply topically 2 times daily as needed for irritation   Yes Unknown, Entered By History   WARFARIN SODIUM PO Take 5 mg by mouth daily   Yes Unknown, Entered By History       Allergies:  Gabapentin; Lidocaine;  "Lasix [furosemide]; and Penicillins    ROS: A ten point review of systems was obtained and negative other than the symptoms noted above in the HPI.     Physical Exam:   /66 (BP Location: Left arm)  Pulse 60  Temp 98.4  F (36.9  C) (Oral)  Resp 16  Ht 1.778 m (5' 10\")  Wt 61.3 kg (135 lb 2.3 oz)  SpO2 92%  BMI 19.39 kg/m2   Constitutional: age appropriate, up in chair, alert, no acute distress  Cardiovascular: regular rate and rhythm, no murmurs,rubs or gallops  Respiratory: clear to auscultation bilaterally  Psychiatric: normal pleasant affect  Head: atraumatic, normocephalic  Neck: supple, no thyromegaly  ENT: mucous membranes are moist, no oral lesions are noted  Abdomen: soft, non-tender, non-distended, normally active bowel sound. No masses or hepatosplenomegaly is appreciated. No rebound tenderness or guarding  Neuro: Neurologically intact grossly  Skin: warm, dry, no rashes are noted    ADDITIONAL COMMENTS:   I reviewed the patient's new clinical lab test results.   Recent Labs   Lab Test  07/24/18   0805 07/23/18   1715 07/05/18 02/21/18   1124   WBC  7.0  7.7   --    --   10.3   HGB  7.2*  7.8*   --    --   10.7*   MCV  67*  67*   --    --   90   PLT  201  202   --    --   230   INR  2.34*  2.43*  2.1*   < >   --     < > = values in this interval not displayed.      Recent Labs   Lab Test  07/24/18   0805  07/23/18   1715  02/12/18   1434   NA  143  140  142   POTASSIUM  4.4  4.4  4.2   CHLORIDE  111*  107  106   CO2  24  26  32   BUN  17  24  23   CR  1.26*  1.42*  0.93   ANIONGAP  8  7  4   AMRITA  7.8*  8.2*  8.5      Recent Labs   Lab Test  08/10/17   1220  02/13/17   0956  05/31/16   1553 03/02/16 02/09/16   2300   09/03/15   0550   07/30/15   1545   05/13/14   1006   ALBUMIN  3.1*   --   2.9*   --   2.9*   < >   --    < >   --    < >  3.7   BILITOTAL  0.4   --   0.4   --   0.3   < >   --    < >   --    < >  0.3   ALT  16  <5*  26  12  21   < >   --    < >   --    < >  14   AST  16   --   " 17  15  12   < >   --    < >   --    < >  18   ALKPHOS  91   --   64   --   92   < >   --    < >   --    < >  92   PROTEIN   --    --    --    --    --    --   Negative   --   10*   --   Negative    < > = values in this interval not displayed.     5/22/12 Colonoscopy ([facility])  Findings:  Polyp location: ascending/transverse colon.  Quantity: 5.  Size:  3-5 mm.  Polyp shape:  sessile.         Maneuver: polypectomy was performed with a cold biopsy forceps and hot snare.       Removal:  complete.  Retrieval: complete.  Bleeding: none.  Polyp location: sigmoid.  Quantity: 1.  Size: 5 mm.  Polyp shape: sessile.         Maneuver: polypectomy was performed with a  hot snare .       Removal: complete.  Retrieval: complete.  Bleeding: none.  1 transverse colon polyp at the site of the previously placed tattoo (removed as described above).  Diverticulosis.  Location: - sigmoid.    Description:  moderate.    Size:  medium.    Quantity:  several.  No inflammation present.  Angioectasia.  Location - ascending.   Number:  few.  No bleeding.     11/27/15 EGD ([place])  Findings:        The esophagus was normal. The z-line was located at 38cm.        The stomach was normal.        The examined duodenum was normal. There were a few traces of red blood        in the duodenum but no identifiable efects.       6/7/16 CT Colonography  IMPRESSION:  1. There are four very small potential colonic polyps identified as  detailed above. The largest is 0.5 cm at the hepatic flexure. The  provided clinical history indicates melena/gastrointestinal  hemorrhage. Please note that CT colonoscopy is intended for screening,  and not intended for diagnostic workup in the setting of a symptomatic  patient. Recommend consideration of colonoscopy, and other modalities  for small bowel workup.  2. An indeterminant right lower lobe base 1.2 cm groundglass nodule.  The overall size is likely not substantially changed since 7/31/2015,  though its internal  density has increased. Therefore, cannot exclude  some degree of progression of a potential pulmonary neoplasm.  Recommend further oncologic workup. If no intervention is to be  performed, recommend short interval followup CT chest in three months.  3. Borderline infrarenal abdominal aortic aneurysm measuring 3 cm.    Assessment: 85-year-old male presenting with weakness found to have new microcytic anemia.  He has not had any visible signs of GI blood loss.  He does have risk factors for peptic ulcer disease.  An AVM or dieulafoy lesion are also within the differential.  He does have known colonic polyps based on the CT colonoscopy from 2016.  These potentially could have grown in size and could be bleeding given his underlying need for anticoagulation, however I would expect to see some visible bleeding if that was the case.  He remains hemodynamically stable at this time.    Plan:   -Regular diet  -Follow hgb and stool output  -Could consider EGD and colonoscopy but will need INR reversed and may require heparin bridging. Will need to discuss with IM  -He has been taking Omeprazole so I doubt that he has an allergy to a PPI and I cannot find documentation about rash/medication reaction so will restart a PPI daily now  -GI following     I discussed the patient's findings and plan with Dr. Toyb Dong who will also independently see and examine the patient.     Charlie Viveros PA-C  [facility]  Cell:  [phone] Monday through Friday 9023-7518  Office: [phone]

## 2018-07-24 NOTE — PLAN OF CARE
Problem: Patient Care Overview  Goal: Plan of Care/Patient Progress Review  Outcome: No Change  Admission    Patient arrives to room 610 via cart from ED   Care plan note: vss, except for bradycardic. Denied dizziness with position changes. Alert x4, denied pain. +BS, lungs clear, on RA. SBA x1. GI consult ordered. Tolerating clear liquid diet. PIV patent and infusing. Clarified MD, no blood transfusion needed unless active bleeding. Current Hgb 7.8. Stool sample pending for occult blood. Tele SB with 1st degree AVB and and occasional PACs. Preexisting rash on upper chest and back. MD aware, pt was already seeing a dermatologist outpatient. Resume PTA meds for itchiness. Calm and cooperative with care. Monitor.     Inpatient nursing criteria listed below were met:    PCD's Documented: Yes  Skin issues/needs documented :Yes  Isolation education started/completed NA  Patient allergies verified with patient: Yes  Verified completion of [place] Risk Assessment Tool:  Yes  Verified completion of Guardianship screening tool: Yes  Fall Prevention: Care plan updated, Education given and documented Yes  Care Plan initiated: Yes  Home medications documented in belongings flowsheet: NA  Patient belongings documented in belongings flowsheet: Yes  Reminder note (belongings/ medications) placed in discharge instructions:Yes  Admission profile/ required documentation complete: Yes

## 2018-07-24 NOTE — PHARMACY-ADMISSION MEDICATION HISTORY
Admission medication history interview status for the 7/23/2018  admission is complete. See EPIC admission navigator for prior to admission medications     Medication history source reliability:Good    Actions taken by pharmacist (provider contacted, etc):Interviewed pt and spouse - Pt did have a written medication list in his wallet     Additional medication history information not noted on PTA med list :None    Medication reconciliation/reorder completed by provider prior to medication history? No    Time spent in this activity: 20 minutes    Prior to Admission medications    Medication Sig Last Dose Taking? Auth Provider   albuterol (PROAIR HFA/PROVENTIL HFA/VENTOLIN HFA) 108 (90 Base) MCG/ACT Inhaler Inhale 1-2 puffs into the lungs every 6 hours as needed for shortness of breath / dyspnea or wheezing PRN Yes Karson Bishop MD   atorvastatin (LIPITOR) 10 MG tablet Take 1 tablet (10 mg) by mouth daily 7/22/2018 at HS Yes Tab Grijalva MD   camphor-menthol (DERMASARRA) 0.5-0.5 % LOTN Apply a thin layer to itchy areas as often as desired PRN Yes En Aviles MD   hydrOXYzine (ATARAX) 25 MG tablet Take 1 to 2 tablets (25-50 mg) by mouth every 6 hours as needed for itching PRN Yes Karson Bishop MD   metoprolol (TOPROL-XL) 100 MG 24 hr tablet Take 1 tablet (100 mg) by mouth daily 7/23/2018 at AM Yes Karson Bishop MD   RANITIDINE HCL PO Take 150 mg by mouth 2 times daily 7/23/2018 at AM Yes Unknown, Entered By History   tiotropium (SPIRIVA HANDIHALER) 18 MCG capsule Inhale 1 capsule (18 mcg) into the lungs daily 7/23/2018 at AM Yes Karson Bishop MD   triamcinolone (KENALOG) 0.1 % cream Apply topically 2 times daily as needed for irritation PRN Yes Unknown, Entered By History   WARFARIN SODIUM PO Take 5 mg by mouth daily 7/23/2018 at AM Yes Unknown, Entered By History

## 2018-07-24 NOTE — PLAN OF CARE
Problem: Anemia (Adult)  Goal: Symptom Improvement  Patient will demonstrate the desired outcomes by discharge/transition of care.   Outcome: No Change  Hgb 7.2 this AM, recheck at 1800, no s/sx of active bleeding.

## 2018-07-24 NOTE — PLAN OF CARE
Problem: Patient Care Overview  Goal: Plan of Care/Patient Progress Review  Outcome: Improving  Pt A/O x4. VSS on RA. Tele SB + BBB. Up with SBA. Pt denies pain. C/O itching (rash on back/chest), PRN atarax and cream administered with relief. Regular diet, tolerated, will be NPO at midnight for possible EGD tomorrow. Voiding adequately. IVF infusing 50ml/hr. Hbg recheck was 7.6. Nursing continue to monitor.

## 2018-07-25 LAB
ANION GAP SERPL CALCULATED.3IONS-SCNC: 4 MMOL/L (ref 3–14)
BASOPHILS # BLD AUTO: 0 10E9/L (ref 0–0.2)
BASOPHILS NFR BLD AUTO: 0.4 %
BUN SERPL-MCNC: 14 MG/DL (ref 7–30)
CALCIUM SERPL-MCNC: 8 MG/DL (ref 8.5–10.1)
CHLORIDE SERPL-SCNC: 112 MMOL/L (ref 94–109)
CO2 SERPL-SCNC: 26 MMOL/L (ref 20–32)
CREAT SERPL-MCNC: 1.13 MG/DL (ref 0.66–1.25)
DIFFERENTIAL METHOD BLD: ABNORMAL
EOSINOPHIL # BLD AUTO: 0.8 10E9/L (ref 0–0.7)
EOSINOPHIL NFR BLD AUTO: 12.1 %
ERYTHROCYTE [DISTWIDTH] IN BLOOD BY AUTOMATED COUNT: 17.6 % (ref 10–15)
GFR SERPL CREATININE-BSD FRML MDRD: 62 ML/MIN/1.7M2
GLUCOSE SERPL-MCNC: 92 MG/DL (ref 70–99)
HCT VFR BLD AUTO: 26.3 % (ref 40–53)
HGB BLD-MCNC: 7.5 G/DL (ref 13.3–17.7)
IMM GRANULOCYTES # BLD: 0 10E9/L (ref 0–0.4)
IMM GRANULOCYTES NFR BLD: 0.4 %
INR PPP: 2.02 (ref 0.86–1.14)
LYMPHOCYTES # BLD AUTO: 1.1 10E9/L (ref 0.8–5.3)
LYMPHOCYTES NFR BLD AUTO: 16.2 %
MCH RBC QN AUTO: 19.4 PG (ref 26.5–33)
MCHC RBC AUTO-ENTMCNC: 28.5 G/DL (ref 31.5–36.5)
MCV RBC AUTO: 68 FL (ref 78–100)
MONOCYTES # BLD AUTO: 0.8 10E9/L (ref 0–1.3)
MONOCYTES NFR BLD AUTO: 11.3 %
NEUTROPHILS # BLD AUTO: 4.1 10E9/L (ref 1.6–8.3)
NEUTROPHILS NFR BLD AUTO: 59.6 %
NRBC # BLD AUTO: 0 10*3/UL
NRBC BLD AUTO-RTO: 0 /100
PLATELET # BLD AUTO: 174 10E9/L (ref 150–450)
POTASSIUM SERPL-SCNC: 4.3 MMOL/L (ref 3.4–5.3)
RBC # BLD AUTO: 3.86 10E12/L (ref 4.4–5.9)
SODIUM SERPL-SCNC: 142 MMOL/L (ref 133–144)
WBC # BLD AUTO: 6.9 10E9/L (ref 4–11)

## 2018-07-25 PROCEDURE — 25000125 ZZHC RX 250: Performed by: PHYSICIAN ASSISTANT

## 2018-07-25 PROCEDURE — 25000128 H RX IP 250 OP 636: Performed by: HOSPITALIST

## 2018-07-25 PROCEDURE — 12000000 ZZH R&B MED SURG/OB

## 2018-07-25 PROCEDURE — 85025 COMPLETE CBC W/AUTO DIFF WBC: CPT | Performed by: HOSPITALIST

## 2018-07-25 PROCEDURE — 85610 PROTHROMBIN TIME: CPT | Performed by: HOSPITALIST

## 2018-07-25 PROCEDURE — 36415 COLL VENOUS BLD VENIPUNCTURE: CPT | Performed by: HOSPITALIST

## 2018-07-25 PROCEDURE — 80048 BASIC METABOLIC PNL TOTAL CA: CPT | Performed by: HOSPITALIST

## 2018-07-25 PROCEDURE — 99232 SBSQ HOSP IP/OBS MODERATE 35: CPT | Performed by: INTERNAL MEDICINE

## 2018-07-25 PROCEDURE — 25000132 ZZH RX MED GY IP 250 OP 250 PS 637: Performed by: HOSPITALIST

## 2018-07-25 PROCEDURE — 25000132 ZZH RX MED GY IP 250 OP 250 PS 637: Performed by: PHYSICIAN ASSISTANT

## 2018-07-25 RX ORDER — POLYETHYLENE GLYCOL 3350 17 G/17G
238 POWDER, FOR SOLUTION ORAL ONCE
Status: COMPLETED | OUTPATIENT
Start: 2018-07-25 | End: 2018-07-25

## 2018-07-25 RX ORDER — MAGNESIUM CARB/ALUMINUM HYDROX 105-160MG
296 TABLET,CHEWABLE ORAL ONCE
Status: COMPLETED | OUTPATIENT
Start: 2018-07-26 | End: 2018-07-26

## 2018-07-25 RX ORDER — MAGNESIUM CARB/ALUMINUM HYDROX 105-160MG
296 TABLET,CHEWABLE ORAL ONCE
Status: DISCONTINUED | OUTPATIENT
Start: 2018-07-26 | End: 2018-07-25

## 2018-07-25 RX ADMIN — TRIAMCINOLONE ACETONIDE: 1 CREAM TOPICAL at 21:51

## 2018-07-25 RX ADMIN — POLYETHYLENE GLYCOL 3350 238 G: 17 POWDER, FOR SOLUTION ORAL at 18:21

## 2018-07-25 RX ADMIN — HYDROXYZINE HYDROCHLORIDE 25 MG: 25 TABLET, FILM COATED ORAL at 10:28

## 2018-07-25 RX ADMIN — PANTOPRAZOLE SODIUM 40 MG: 40 INJECTION, POWDER, FOR SOLUTION INTRAVENOUS at 09:57

## 2018-07-25 RX ADMIN — HYDROXYZINE HYDROCHLORIDE 25 MG: 25 TABLET, FILM COATED ORAL at 16:59

## 2018-07-25 RX ADMIN — TRIAMCINOLONE ACETONIDE: 1 CREAM TOPICAL at 10:03

## 2018-07-25 RX ADMIN — UMECLIDINIUM 62.5 MCG: 62.5 AEROSOL, POWDER ORAL at 10:01

## 2018-07-25 RX ADMIN — ATORVASTATIN CALCIUM 10 MG: 10 TABLET, FILM COATED ORAL at 21:51

## 2018-07-25 RX ADMIN — SODIUM CHLORIDE: 9 INJECTION, SOLUTION INTRAVENOUS at 17:22

## 2018-07-25 ASSESSMENT — ACTIVITIES OF DAILY LIVING (ADL)
ADLS_ACUITY_SCORE: 9

## 2018-07-25 NOTE — PROVIDER NOTIFICATION
Call out for PA Edstrom with GI to clarify order for prep for colonoscopy.  Only mag citrate is currently ordered.

## 2018-07-25 NOTE — PROGRESS NOTES
[facility]    Hospitalist Progress Note    Assessment & Plan   Hermilo Velasquez is a 85 year old male who presents with dizziness for 1 week, found to have low blood pressure and acute worsening of his anemia     Relative hypotension, bradycardia  Underlying rhythm is sinus bradycardia  Improved with fluids\no obvious infection present  - hold blood presssure medications       Acute on chronic anemia from iron deficiency  Drop from 10.7   Has become microcytic, iron studies consistent with fe deficiency  No obvious source of bleeding, on warfarin  Presumably GI, last colon was 5 years ago and had polyps  Also has severe heartburn, they have been discontinued on the PPI because of allergy by the allergist and so has been taking zantac  - appreciate GI input  For colonoscopy tomorrow   Monitor hemoglobin  and transfuse as needed.     Acute kidney injury  Likely prerenal,improving   -  recheck in the AM     History of DVT/PE  - will hold coumadin, consider reversal if GI considering EGD or colonoscopy  - wont need reversal it seems      H/o Aortive valve replacement, bioprosthetic  - noted     GERD  - zantac     Cough  History of COPD  CXR clear  Diastolic heart failure   - echocardiogram noted  - careful with hydration   - continue home meds     Pruritus  - resumed home medications    DVT Prophylaxis: international normalized ratio  2 ,warfarin on hold   Code Status: Full Code     Disposition: Expected discharge following scope     Aishwarya Beaver MD  Text Page   (7am to 6pm)    Interval History   Had 2 BM overnight, no bleeding noted, planning to prep for colonoscopy, international normalized ratio  Is close to 2 , deep vein thrombosis was 2 years ago.    -Data reviewed today: I reviewed all new labs and imaging results over the last 24 hours. I personally reviewed     Physical Exam   Temp: 98.9  F (37.2  C) Temp src: Oral BP: 124/53   Heart Rate: 75 Resp: 16 SpO2: 92 % O2 Device: None (Room air)     Vitals:    07/23/18 1709 07/23/18 1711 07/23/18 1946   Weight: 78.5 kg (173 lb) 79.4 kg (175 lb) 61.3 kg (135 lb 2.3 oz)     Vital Signs with Ranges  Temp:  [97.8  F (36.6  C)-98.9  F (37.2  C)] 98.9  F (37.2  C)  Heart Rate:  [62-75] 75  Resp:  [16] 16  BP: (124-134)/(53-68) 124/53  SpO2:  [92 %-95 %] 92 %  I/O last 3 completed shifts:  In: 3214 [P.O.:1020; I.V.:2194]  Out: 1825 [Urine:1825]    Constitutional:Awake, alert, cooperative, no apparent distress  Respiratory: Clear to auscultation bilaterally, no crackles or wheezing  Cardiovascular: Regular rate and rhythm, normal S1 and S2, and no murmur noted  GI: Normal bowel sounds, soft, non-distended, non-tender  Skin/Integumen: No rashes, no cyanosis, no edema  Neuro : moving all 4 extremities, no focal deficit noted     Medications     sodium chloride 50 mL/hr at 07/24/18 1955       atorvastatin  10 mg Oral At Bedtime     pantoprazole (PROTONIX) IV  40 mg Intravenous Daily with breakfast     umeclidinium  62.5 mcg Inhalation Daily       Data     Recent Labs  Lab 07/25/18  0803 07/24/18  1805 07/24/18  0805 07/23/18  1715   WBC 6.9  --  7.0 7.7   HGB 7.5* 7.6* 7.2* 7.8*   MCV 68*  --  67* 67*     --  201 202   INR 2.02*  --  2.34* 2.43*     --  143 140   POTASSIUM 4.3  --  4.4 4.4   CHLORIDE 112*  --  111* 107   CO2 26  --  24 26   BUN 14  --  17 24   CR 1.13  --  1.26* 1.42*   ANIONGAP 4  --  8 7   AMRITA 8.0*  --  7.8* 8.2*   GLC 92  --  83 109*   TROPI  --   --   --  <0.015       Recent Labs  Lab 07/25/18  0803 07/24/18  0805 07/23/18  1715   [facility] 92 83 109*       Imaging:   No results found for this or any previous visit (from the past 24 hour(s)).

## 2018-07-25 NOTE — PROGRESS NOTES
"GASTROENTEROLOGY PROGRESS NOTE     SUBJECTIVE:  Had 2 brown formed BMs yesterday. Denies nausea, vomiting, abdominal pain, melena, rectal bleeding.      OBJECTIVE:  /53 (BP Location: Left arm)  Pulse 60  Temp 98.9  F (37.2  C) (Oral)  Resp 16  Ht 1.778 m (5' 10\")  Wt 61.3 kg (135 lb 2.3 oz)  SpO2 92%  BMI 19.39 kg/m2  Temp (24hrs), Av.3  F (36.8  C), Min:97.8  F (36.6  C), Max:98.9  F (37.2  C)    Patient Vitals for the past 72 hrs:   Weight   18 1946 61.3 kg (135 lb 2.3 oz)   18 1711 79.4 kg (175 lb)   18 1709 78.5 kg (173 lb)       Intake/Output Summary (Last 24 hours) at 18 1031  Last data filed at 18 0800   Gross per 24 hour   Intake             1697 ml   Output             1850 ml   Net             -153 ml        PHYSICAL EXAM  Gen: alert, oriented, NAD  CV: RRR  Lungs: clear  Abd: soft, NT/ND, +BS     Additional Comments:  ROS, FH, SH: See initial GI consult for details.     I have reviewed the patient's new clinical lab results:     Recent Labs   Lab Test  18   0803  18   1805  18   0805  18   1715   WBC  6.9   --   7.0  7.7   HGB  7.5*  7.6*  7.2*  7.8*   MCV  68*   --   67*  67*   PLT  174   --   201  202   INR  2.02*   --   2.34*  2.43*     Recent Labs   Lab Test  18   0803  18   0805  18   1715   POTASSIUM  4.3  4.4  4.4   CHLORIDE  112*  111*  107   CO2  26  24  26   BUN  14  17  24   ANIONGAP  4  8  7     Recent Labs   Lab Test  08/10/17   1220  17   0956  16   1553 16   2300   09/03/15   0550   07/30/15   1545   14   1006   ALBUMIN  3.1*   --   2.9*   --   2.9*   < >   --    < >   --    < >  3.7   BILITOTAL  0.4   --   0.4   --   0.3   < >   --    < >   --    < >  0.3   ALT  16  <5*  26  12  21   < >   --    < >   --    < >  14   AST  16   --   17  15  12   < >   --    < >   --    < >  18   PROTEIN   --    --    --    --    --    --   Negative   --   10*   --   Negative    < > = " values in this interval not displayed.     /22/12 Colonoscopy ([facility])  Findings:  Polyp location: ascending/transverse colon.  Quantity: 5.  Size:  3-5 mm.  Polyp shape:  sessile.         Maneuver: polypectomy was performed with a cold biopsy forceps and hot snare.       Removal:  complete.  Retrieval: complete.  Bleeding: none.  Polyp location: sigmoid.  Quantity: 1.  Size: 5 mm.  Polyp shape: sessile.         Maneuver: polypectomy was performed with a  hot snare .       Removal: complete.  Retrieval: complete.  Bleeding: none.  1 transverse colon polyp at the site of the previously placed tattoo (removed as described above).  Diverticulosis.  Location: - sigmoid.    Description:  moderate.    Size:  medium.    Quantity:  several.  No inflammation present.  Angioectasia.  Location - ascending.   Number:  few.  No bleeding.     11/27/15 EGD ([facility])  Findings:        The esophagus was normal. The z-line was located at 38cm.        The stomach was normal.        The examined duodenum was normal. There were a few traces of red blood        in the duodenum but no identifiable efects.         6/7/16 CT Colonography  IMPRESSION:  1. There are four very small potential colonic polyps identified as  detailed above. The largest is 0.5 cm at the hepatic flexure. The  provided clinical history indicates melena/gastrointestinal  hemorrhage. Please note that CT colonoscopy is intended for screening,  and not intended for diagnostic workup in the setting of a symptomatic  patient. Recommend consideration of colonoscopy, and other modalities  for small bowel workup.  2. An indeterminant right lower lobe base 1.2 cm groundglass nodule.  The overall size is likely not substantially changed since 7/31/2015,  though its internal density has increased. Therefore, cannot exclude  some degree of progression of a potential pulmonary neoplasm.  Recommend further oncologic workup. If no intervention is to be  performed, recommend short  interval followup CT chest in three months.  3. Borderline infrarenal abdominal aortic aneurysm measuring 3 cm.    Assessment:  85 year old male with past medical history significant for severe aortic stenosis s/p AVR bioprosthesis 2015, paroxysmal A fib, DVT, GERD, non-Hodgkin's lymphoma, and monoclonal gammopathy presenting with weakness/dizziness and found to be hypotensive and declining HGB.     1. Microcytic anemia. HGB down 3 grams over past 6 months, 7.8 on admission. INR initially therapeutic at 2.48. Iron studies consistent with iron deficiency. No clinical signs of active bleeding. HGB stable. Prior CT colonography 6/2016 showing 4 potential colonic polyps, largest at hepatic flexure. Polyps possible source of anemia, otherwise unclear. Most recent colonoscopy 2012, 6 tubular adenomas removed. EGD 2015 normal. INR today 2.02. Needs to be <2 for polypectomy per Dr. Dong.   --Clear liquids, no reds today.   --Likely colon prep this afternoon for EGD/Colon tomorrow.   --NPO midnight.   --INR ordered for tomorrow am.   --Will review with Dr. Dong.     Britany Asencio PA-C  [facility]

## 2018-07-25 NOTE — PLAN OF CARE
Problem: Patient Care Overview  Goal: Plan of Care/Patient Progress Review  Outcome: Improving  A/O x4. VSS on ra. SBA. NPO plans for EGD today. No s/s of weakness overnight. Adequate uo overnight, no bm. NS at 50/hr. Nursing will continue to follow.

## 2018-07-25 NOTE — PROVIDER NOTIFICATION
Paged Dr Dong to clarify order for colonoscopy prep as only mag citrate is ordered.    Addendum: Spoke with HEMAL Asencio and Dr. Dong, Miralax and gatorade order to be placed and started now. Will wait for order to be placed and nurse will start prep ASAP.

## 2018-07-25 NOTE — PLAN OF CARE
Problem: Patient Care Overview  Goal: Plan of Care/Patient Progress Review  Outcome: No Change  NPO this morning until plan determined, then has been on clear liquids and tolerating.  Plan is for NPO after midnight with EGD and Colonoscopy tomorrow.  Continues on IVF at 50/hr.  Voiding per urinal.  Up with SBA to chair; declined walk in bartlett.  PCD's on when in bed, declines at times, rationale for PCD's explained.  Tele SR/BBB/PVC.  Hgb 7.5 stable.  INR 2.02 today.

## 2018-07-26 ENCOUNTER — ANESTHESIA EVENT (OUTPATIENT)
Dept: GASTROENTEROLOGY | Facility: CLINIC | Age: 83
DRG: 812 | End: 2018-07-26
Payer: COMMERCIAL

## 2018-07-26 ENCOUNTER — SURGERY (OUTPATIENT)
Age: 83
End: 2018-07-26

## 2018-07-26 ENCOUNTER — ANESTHESIA (OUTPATIENT)
Dept: GASTROENTEROLOGY | Facility: CLINIC | Age: 83
DRG: 812 | End: 2018-07-26
Payer: COMMERCIAL

## 2018-07-26 LAB
ANION GAP SERPL CALCULATED.3IONS-SCNC: 9 MMOL/L (ref 3–14)
BASOPHILS # BLD AUTO: 0 10E9/L (ref 0–0.2)
BASOPHILS NFR BLD AUTO: 0.3 %
BLD PROD TYP BPU: NORMAL
BLD UNIT ID BPU: 0
BLOOD PRODUCT CODE: NORMAL
BPU ID: NORMAL
BUN SERPL-MCNC: 7 MG/DL (ref 7–30)
CALCIUM SERPL-MCNC: 8.3 MG/DL (ref 8.5–10.1)
CHLORIDE SERPL-SCNC: 109 MMOL/L (ref 94–109)
CO2 SERPL-SCNC: 25 MMOL/L (ref 20–32)
COLONOSCOPY: NORMAL
CREAT SERPL-MCNC: 1.06 MG/DL (ref 0.66–1.25)
DIFFERENTIAL METHOD BLD: ABNORMAL
ELLIPTOCYTES BLD QL SMEAR: SLIGHT
EOSINOPHIL # BLD AUTO: 1 10E9/L (ref 0–0.7)
EOSINOPHIL NFR BLD AUTO: 13.7 %
ERYTHROCYTE [DISTWIDTH] IN BLOOD BY AUTOMATED COUNT: 17.4 % (ref 10–15)
GFR SERPL CREATININE-BSD FRML MDRD: 66 ML/MIN/1.7M2
GLUCOSE SERPL-MCNC: 94 MG/DL (ref 70–99)
HCT VFR BLD AUTO: 26 % (ref 40–53)
HGB BLD-MCNC: 7.6 G/DL (ref 13.3–17.7)
IMM GRANULOCYTES # BLD: 0 10E9/L (ref 0–0.4)
IMM GRANULOCYTES NFR BLD: 0 %
INR PPP: 1.73 (ref 0.86–1.14)
LYMPHOCYTES # BLD AUTO: 0.9 10E9/L (ref 0.8–5.3)
LYMPHOCYTES NFR BLD AUTO: 11.3 %
MCH RBC QN AUTO: 19.4 PG (ref 26.5–33)
MCHC RBC AUTO-ENTMCNC: 29.2 G/DL (ref 31.5–36.5)
MCV RBC AUTO: 67 FL (ref 78–100)
MONOCYTES # BLD AUTO: 1.1 10E9/L (ref 0–1.3)
MONOCYTES NFR BLD AUTO: 14.4 %
NEUTROPHILS # BLD AUTO: 4.5 10E9/L (ref 1.6–8.3)
NEUTROPHILS NFR BLD AUTO: 60.3 %
NRBC # BLD AUTO: 0 10*3/UL
NRBC BLD AUTO-RTO: 0 /100
OVALOCYTES BLD QL SMEAR: SLIGHT
PLATELET # BLD AUTO: 210 10E9/L (ref 150–450)
PLATELET # BLD EST: ABNORMAL 10*3/UL
POTASSIUM SERPL-SCNC: 3.9 MMOL/L (ref 3.4–5.3)
RBC # BLD AUTO: 3.91 10E12/L (ref 4.4–5.9)
RBC INCLUSIONS BLD: SLIGHT
SODIUM SERPL-SCNC: 143 MMOL/L (ref 133–144)
TRANSFUSION STATUS PATIENT QL: NORMAL
TRANSFUSION STATUS PATIENT QL: NORMAL
UPPER GI ENDOSCOPY: NORMAL
WBC # BLD AUTO: 7.5 10E9/L (ref 4–11)

## 2018-07-26 PROCEDURE — 88305 TISSUE EXAM BY PATHOLOGIST: CPT | Mod: 26 | Performed by: INTERNAL MEDICINE

## 2018-07-26 PROCEDURE — 88305 TISSUE EXAM BY PATHOLOGIST: CPT | Performed by: INTERNAL MEDICINE

## 2018-07-26 PROCEDURE — 0DBL8ZZ EXCISION OF TRANSVERSE COLON, VIA NATURAL OR ARTIFICIAL OPENING ENDOSCOPIC: ICD-10-PCS | Performed by: INTERNAL MEDICINE

## 2018-07-26 PROCEDURE — 25000128 H RX IP 250 OP 636: Performed by: NURSE ANESTHETIST, CERTIFIED REGISTERED

## 2018-07-26 PROCEDURE — 37000008 ZZH ANESTHESIA TECHNICAL FEE, 1ST 30 MIN: Performed by: INTERNAL MEDICINE

## 2018-07-26 PROCEDURE — 12000000 ZZH R&B MED SURG/OB

## 2018-07-26 PROCEDURE — 25000132 ZZH RX MED GY IP 250 OP 250 PS 637: Performed by: PHYSICIAN ASSISTANT

## 2018-07-26 PROCEDURE — 85025 COMPLETE CBC W/AUTO DIFF WBC: CPT | Performed by: HOSPITALIST

## 2018-07-26 PROCEDURE — 25000128 H RX IP 250 OP 636: Performed by: HOSPITALIST

## 2018-07-26 PROCEDURE — 36415 COLL VENOUS BLD VENIPUNCTURE: CPT | Performed by: HOSPITALIST

## 2018-07-26 PROCEDURE — 85610 PROTHROMBIN TIME: CPT | Performed by: HOSPITALIST

## 2018-07-26 PROCEDURE — 40000010 ZZH STATISTIC ANES STAT CODE-CRNA PER MINUTE: Performed by: INTERNAL MEDICINE

## 2018-07-26 PROCEDURE — 80048 BASIC METABOLIC PNL TOTAL CA: CPT | Performed by: HOSPITALIST

## 2018-07-26 PROCEDURE — 0DJ08ZZ INSPECTION OF UPPER INTESTINAL TRACT, VIA NATURAL OR ARTIFICIAL OPENING ENDOSCOPIC: ICD-10-PCS | Performed by: INTERNAL MEDICINE

## 2018-07-26 PROCEDURE — 43235 EGD DIAGNOSTIC BRUSH WASH: CPT | Performed by: INTERNAL MEDICINE

## 2018-07-26 PROCEDURE — 25000132 ZZH RX MED GY IP 250 OP 250 PS 637: Performed by: HOSPITALIST

## 2018-07-26 PROCEDURE — 99232 SBSQ HOSP IP/OBS MODERATE 35: CPT | Performed by: INTERNAL MEDICINE

## 2018-07-26 PROCEDURE — 45385 COLONOSCOPY W/LESION REMOVAL: CPT | Performed by: INTERNAL MEDICINE

## 2018-07-26 PROCEDURE — 37000009 ZZH ANESTHESIA TECHNICAL FEE, EACH ADDTL 15 MIN: Performed by: INTERNAL MEDICINE

## 2018-07-26 PROCEDURE — 25000125 ZZHC RX 250: Performed by: PHYSICIAN ASSISTANT

## 2018-07-26 RX ORDER — PROPOFOL 10 MG/ML
INJECTION, EMULSION INTRAVENOUS CONTINUOUS PRN
Status: DISCONTINUED | OUTPATIENT
Start: 2018-07-26 | End: 2018-07-26

## 2018-07-26 RX ORDER — LIDOCAINE 40 MG/G
CREAM TOPICAL
Status: DISCONTINUED | OUTPATIENT
Start: 2018-07-26 | End: 2018-07-26

## 2018-07-26 RX ORDER — ONDANSETRON 2 MG/ML
INJECTION INTRAMUSCULAR; INTRAVENOUS PRN
Status: DISCONTINUED | OUTPATIENT
Start: 2018-07-26 | End: 2018-07-26

## 2018-07-26 RX ORDER — SODIUM CHLORIDE, SODIUM LACTATE, POTASSIUM CHLORIDE, CALCIUM CHLORIDE 600; 310; 30; 20 MG/100ML; MG/100ML; MG/100ML; MG/100ML
INJECTION, SOLUTION INTRAVENOUS CONTINUOUS PRN
Status: DISCONTINUED | OUTPATIENT
Start: 2018-07-26 | End: 2018-07-26

## 2018-07-26 RX ADMIN — HYDROXYZINE HYDROCHLORIDE 25 MG: 25 TABLET, FILM COATED ORAL at 09:25

## 2018-07-26 RX ADMIN — PROPOFOL 100 MCG/KG/MIN: 10 INJECTION, EMULSION INTRAVENOUS at 13:20

## 2018-07-26 RX ADMIN — TRIAMCINOLONE ACETONIDE: 1 CREAM TOPICAL at 19:28

## 2018-07-26 RX ADMIN — SODIUM CHLORIDE, POTASSIUM CHLORIDE, SODIUM LACTATE AND CALCIUM CHLORIDE: 600; 310; 30; 20 INJECTION, SOLUTION INTRAVENOUS at 13:20

## 2018-07-26 RX ADMIN — SODIUM CHLORIDE: 9 INJECTION, SOLUTION INTRAVENOUS at 14:49

## 2018-07-26 RX ADMIN — MAGESIUM CITRATE 296 ML: 1.75 LIQUID ORAL at 05:12

## 2018-07-26 RX ADMIN — UMECLIDINIUM 62.5 MCG: 62.5 AEROSOL, POWDER ORAL at 14:51

## 2018-07-26 RX ADMIN — PANTOPRAZOLE SODIUM 40 MG: 40 INJECTION, POWDER, FOR SOLUTION INTRAVENOUS at 09:12

## 2018-07-26 RX ADMIN — ATORVASTATIN CALCIUM 10 MG: 10 TABLET, FILM COATED ORAL at 22:08

## 2018-07-26 RX ADMIN — ONDANSETRON 4 MG: 2 INJECTION INTRAMUSCULAR; INTRAVENOUS at 13:43

## 2018-07-26 RX ADMIN — HYDROXYZINE HYDROCHLORIDE 25 MG: 25 TABLET, FILM COATED ORAL at 17:06

## 2018-07-26 ASSESSMENT — ACTIVITIES OF DAILY LIVING (ADL)
ADLS_ACUITY_SCORE: 9

## 2018-07-26 ASSESSMENT — COPD QUESTIONNAIRES: COPD: 1

## 2018-07-26 NOTE — PLAN OF CARE
Problem: Patient Care Overview  Goal: Plan of Care/Patient Progress Review  Outcome: Improving  Pt A/O x4. VSS on RA, ex bradycardic. Tele SB with BBB. Up with SBA to bathroom/chair. Pt denies pain. Clear liquid diet, tolerated. Bowel prep initiated for EGD/colonoscopy for tomorrow. Voiding adequately. 2 watery brown BMs this shift. IVF infusing at 50ml/hr. Hbg stable at 7.5.  Nursing continue to monitor.

## 2018-07-26 NOTE — ANESTHESIA CARE TRANSFER NOTE
Patient: Hermilo Velasquez    Procedure(s):  colonoscopy/gastroscopy - Wound Class: II-Clean Contaminated   - Wound Class: II-Clean Contaminated    Diagnosis: anemia  Diagnosis Additional Information: No value filed.    Anesthesia Type:   MAC     Note:  Airway :Nasal Cannula  Patient transferred to:PACU  Comments: At end of procedure, spontaneous respirations, patient alert to voice, able to follow commands. Oxygen via nasal cannula at 2 liters per minute to PACU. Oxygen tubing connected to wall O2 in PACU, SpO2, NiBP, and EKG monitors and alarms on and functioning, Raymond Hugger warmer connected to patient gown, report on patient's clinical status given to PACU RN, RN questions answered.Handoff Report: Identifed the Patient, Identified the Reponsible Provider, Reviewed the pertinent medical history, Discussed the surgical course, Reviewed Intra-OP anesthesia mangement and issues during anesthesia, Set expectations for post-procedure period and Allowed opportunity for questions and acknowledgement of understanding      Vitals: (Last set prior to Anesthesia Care Transfer)    CRNA VITALS  7/26/2018 1329 - 7/26/2018 1402      7/26/2018             NIBP: 120/65    Pulse: 87    NIBP Mean: 89    Ht Rate: 87    SpO2: 90 %    Resp Rate (set): 10                Electronically Signed By: ILYA Bang CRNA  July 26, 2018  2:02 PM

## 2018-07-26 NOTE — ANESTHESIA POSTPROCEDURE EVALUATION
Patient: Hermilo Velasquez    Procedure(s):  colonoscopy/gastroscopy - Wound Class: II-Clean Contaminated   - Wound Class: II-Clean Contaminated    Diagnosis:anemia  Diagnosis Additional Information: No value filed.    Anesthesia Type:  MAC    Note:  Anesthesia Post Evaluation    Patient location during evaluation: PACU  Patient participation: Able to fully participate in evaluation  Level of consciousness: awake and alert  Pain management: adequate  Airway patency: patent  Cardiovascular status: acceptable  Respiratory status: acceptable and unassisted  Hydration status: acceptable  PONV: none             Last vitals:  Vitals:    07/26/18 1420 07/26/18 1430 07/26/18 1445   BP: 137/70 144/87 126/63   Pulse:      Resp: 18 8 18   Temp:   36.9  C (98.4  F)   SpO2: 94% 92% 90%         Electronically Signed By: Chuy Franco MD  July 26, 2018  3:02 PM

## 2018-07-26 NOTE — PLAN OF CARE
Problem: Fall Risk (Adult)  Goal: Absence of Falls  Patient will demonstrate the desired outcomes by discharge/transition of care.   Outcome: No Change  Pt alert, oriented x4.   Returned from Colonoscopy at 1440 on 2L O2 (O2 not previously required).  Vitals otherwise stable.  Up with SBA to bathroom, Voids well, BM's clear due to bowel prep prior.  Denies pain, itchiness managed with atarax PRN.  NPO, PM shift to clarify diet post-colonoscopy.

## 2018-07-26 NOTE — ANESTHESIA PREPROCEDURE EVALUATION
Anesthesia Evaluation     . Pt has had prior anesthetic.     No history of anesthetic complications          ROS/MED HX    ENT/Pulmonary:     (+)COPD, , . .   (-) sleep apnea   Neurologic:     (+)neuropathy     Cardiovascular:     (+) hypertension----. : . . . :. valvular problems/murmurs type: AS s/p AVR:. Previous cardiac testing Echodate:results:The left ventricle is normal in size.  The visual ejection fraction is estimated at 50-55%.  Diastolic Doppler findings (E/E' ratio and/or other parameters) suggest left  ventricular filling pressures are increased.  No regional wall motion abnormalities noted.  The right ventricle is moderately dilated.  Mildly decreased right ventricular systolic function  There is a bioprosthetic aortic valve.  The prosthetic aortic valve appears to open well.  The gradient is normal for this prosthetic aortic valve.date: results: date: results: date: results:          METS/Exercise Tolerance:     Hematologic: Comments: Hb drop from 10 -> 7.6    (+) History of blood clots pt is anticoagulated, Anemia, Other Hematologic Disorder-mul;tiple myeloma      Musculoskeletal:         GI/Hepatic:     (+) GERD Asymptomatic on medication,       Renal/Genitourinary: Comment: ERIKA improved    (+) chronic renal disease, type: ARF,       Endo:     (+) type II DM .      Psychiatric:         Infectious Disease:         Malignancy:         Other:                     Physical Exam      Airway   Mallampati: II  TM distance: >3 FB  Neck ROM: full    Dental   (+) upper dentures    Cardiovascular   Rhythm and rate: regular      Pulmonary (+) rales                       Anesthesia Plan      History & Physical Review  History and physical reviewed and following examination; no interval change.    ASA Status:  3 .    NPO Status:  > 8 hours    Plan for MAC Reason for MAC:  Deep or markedly invasive procedure (G8)  PONV prophylaxis:  Ondansetron (or other 5HT-3)       Postoperative Care  Postoperative pain  management:  IV analgesics.      Consents  Anesthetic plan, risks, benefits and alternatives discussed with:  Patient..                          .

## 2018-07-26 NOTE — OR NURSING
Report called post procedure, pt O2 sat hovering 85 %. Anesthesiologist visits pt, pt expectorates phlegm and oxygen in the 93% range. Transferred with 2LO2.

## 2018-07-26 NOTE — PROGRESS NOTES
[facility]    Hospitalist Progress Note    Assessment & Plan   Hermilo Velasquez is a 85 year old male who presents with dizziness for 1 week, found to have low blood pressure and acute worsening of his anemia     Relative hypotension, bradycardia  Underlying rhythm is sinus bradycardia  Improved with fluids\no obvious infection present  - hold blood presssure medications  -blood pressures continue to be soft still       Acute on chronic anemia from iron deficiency  Drop from 10.7   Has become microcytic, iron studies consistent with fe deficiency  No obvious source of bleeding, on warfarin  Presumably GI, last colon was 5 years ago and had polyps  Also has severe heartburn, they have been discontinued on the PPI because of allergy by the allergist and so has been taking zantac  - appreciate GI input  For colonoscopy today  Monitor hemoglobin  and transfuse as needed.     Acute kidney injury  Likely prerenal,resolved     History of DVT/PE  - will hold coumadin, consider reversal if GI considering EGD or colonoscopy  - wont need reversal  -restart warfarin depending on colonoscopy findings      H/o Aortive valve replacement, bioprosthetic  - noted     GERD  - zantac     Cough  History of COPD  CXR clear  Diastolic heart failure   - echocardiogram noted  - careful with hydration   - continue home meds     Pruritus  - resumed home medications    DVT Prophylaxis: international normalized ratio  2 ,warfarin on hold   Code Status: Full Code     Disposition: Expected discharge following scope     Aishwarya Beaver MD  Text Page   (7am to 6pm)    Interval History   Undergoing bowel prep for procedure today , no other issues today, feeling tired.    -Data reviewed today: I reviewed all new labs and imaging results over the last 24 hours. I personally reviewed     Physical Exam   Temp: 98.6  F (37  C) Temp src: Oral BP: 127/75 Pulse: 61 Heart Rate: 87 Resp: 18 SpO2: 93 % O2 Device: None (Room air)    Vitals:     07/23/18 1709 07/23/18 1711 07/23/18 1946   Weight: 78.5 kg (173 lb) 79.4 kg (175 lb) 61.3 kg (135 lb 2.3 oz)     Vital Signs with Ranges  Temp:  [98  F (36.7  C)-98.6  F (37  C)] 98.6  F (37  C)  Pulse:  [61] 61  Heart Rate:  [] 87  Resp:  [16-18] 18  BP: (118-127)/(72-77) 127/75  SpO2:  [91 %-95 %] 93 %  I/O last 3 completed shifts:  In: 1619 [P.O.:580; I.V.:1039]  Out: 750 [Urine:750]    Constitutional:Awake, alert, cooperative, no apparent distress  Respiratory: Clear to auscultation bilaterally, no crackles or wheezing  Cardiovascular: Regular rate and rhythm, normal S1 and S2, and no murmur noted  GI: Normal bowel sounds, soft, non-distended, non-tender  Skin/Integumen: No rashes, no cyanosis, no edema  Neuro : moving all 4 extremities, no focal deficit noted     Medications     - MEDICATION INSTRUCTIONS -       sodium chloride 50 mL/hr at 07/25/18 1722       atorvastatin  10 mg Oral At Bedtime     pantoprazole (PROTONIX) IV  40 mg Intravenous Daily with breakfast     sodium chloride (PF)  3 mL Intracatheter Q8H     umeclidinium  62.5 mcg Inhalation Daily       Data     Recent Labs  Lab 07/26/18  0808 07/25/18  0803 07/24/18  1805 07/24/18  0805 07/23/18  1715   WBC 7.5 6.9  --  7.0 7.7   HGB 7.6* 7.5* 7.6* 7.2* 7.8*   MCV 67* 68*  --  67* 67*    174  --  201 202   INR 1.73* 2.02*  --  2.34* 2.43*    142  --  143 140   POTASSIUM 3.9 4.3  --  4.4 4.4   CHLORIDE 109 112*  --  111* 107   CO2 25 26  --  24 26   BUN 7 14  --  17 24   CR 1.06 1.13  --  1.26* 1.42*   ANIONGAP 9 4  --  8 7   AMRITA 8.3* 8.0*  --  7.8* 8.2*   GLC 94 92  --  83 109*   TROPI  --   --   --   --  <0.015       Recent Labs  Lab 07/26/18  0808 07/25/18  0803 07/24/18  0805 07/23/18  1715   GLC 94 92 83 109*       Imaging:   No results found for this or any previous visit (from the past 24 hour(s)).

## 2018-07-26 NOTE — PLAN OF CARE
Problem: Patient Care Overview  Goal: Plan of Care/Patient Progress Review  Outcome: Improving  Patient A/OX4. Up SBA to the bathroom. VSS except tachy at times, BP improved and stable. On room air denies SOB, pain or nausea. NPO after midnight. Bowel prep started last evening, stools watery and light brown. NS running at 50ml/hr. EGD/combined colonoscopy scheduled for today. Hgb stable at 7.5. Tele SR with PVC. Discharge pending results of EGD. Nursing continue to monitor.

## 2018-07-27 LAB
ANION GAP SERPL CALCULATED.3IONS-SCNC: 7 MMOL/L (ref 3–14)
BASOPHILS # BLD AUTO: 0 10E9/L (ref 0–0.2)
BASOPHILS NFR BLD AUTO: 0.1 %
BUN SERPL-MCNC: 7 MG/DL (ref 7–30)
CALCIUM SERPL-MCNC: 7.9 MG/DL (ref 8.5–10.1)
CHLORIDE SERPL-SCNC: 108 MMOL/L (ref 94–109)
CO2 SERPL-SCNC: 24 MMOL/L (ref 20–32)
COPATH REPORT: NORMAL
CREAT SERPL-MCNC: 1.12 MG/DL (ref 0.66–1.25)
DIFFERENTIAL METHOD BLD: ABNORMAL
EOSINOPHIL # BLD AUTO: 1.2 10E9/L (ref 0–0.7)
EOSINOPHIL NFR BLD AUTO: 11.3 %
ERYTHROCYTE [DISTWIDTH] IN BLOOD BY AUTOMATED COUNT: 17.5 % (ref 10–15)
GFR SERPL CREATININE-BSD FRML MDRD: 62 ML/MIN/1.7M2
GLUCOSE SERPL-MCNC: 131 MG/DL (ref 70–99)
HCT VFR BLD AUTO: 25.8 % (ref 40–53)
HGB BLD-MCNC: 7.4 G/DL (ref 13.3–17.7)
IMM GRANULOCYTES # BLD: 0 10E9/L (ref 0–0.4)
IMM GRANULOCYTES NFR BLD: 0.2 %
INR PPP: 1.56 (ref 0.86–1.14)
LYMPHOCYTES # BLD AUTO: 0.9 10E9/L (ref 0.8–5.3)
LYMPHOCYTES NFR BLD AUTO: 8.9 %
MCH RBC QN AUTO: 19.5 PG (ref 26.5–33)
MCHC RBC AUTO-ENTMCNC: 28.7 G/DL (ref 31.5–36.5)
MCV RBC AUTO: 68 FL (ref 78–100)
MONOCYTES # BLD AUTO: 1.1 10E9/L (ref 0–1.3)
MONOCYTES NFR BLD AUTO: 10.6 %
NEUTROPHILS # BLD AUTO: 7.1 10E9/L (ref 1.6–8.3)
NEUTROPHILS NFR BLD AUTO: 68.9 %
NRBC # BLD AUTO: 0 10*3/UL
NRBC BLD AUTO-RTO: 0 /100
PLATELET # BLD AUTO: 205 10E9/L (ref 150–450)
POTASSIUM SERPL-SCNC: 4.2 MMOL/L (ref 3.4–5.3)
RBC # BLD AUTO: 3.79 10E12/L (ref 4.4–5.9)
SODIUM SERPL-SCNC: 139 MMOL/L (ref 133–144)
WBC # BLD AUTO: 10.3 10E9/L (ref 4–11)

## 2018-07-27 PROCEDURE — 85025 COMPLETE CBC W/AUTO DIFF WBC: CPT | Performed by: HOSPITALIST

## 2018-07-27 PROCEDURE — 36415 COLL VENOUS BLD VENIPUNCTURE: CPT | Performed by: INTERNAL MEDICINE

## 2018-07-27 PROCEDURE — 25000128 H RX IP 250 OP 636: Performed by: INTERNAL MEDICINE

## 2018-07-27 PROCEDURE — 80048 BASIC METABOLIC PNL TOTAL CA: CPT | Performed by: HOSPITALIST

## 2018-07-27 PROCEDURE — 36415 COLL VENOUS BLD VENIPUNCTURE: CPT | Performed by: HOSPITALIST

## 2018-07-27 PROCEDURE — 99232 SBSQ HOSP IP/OBS MODERATE 35: CPT | Performed by: INTERNAL MEDICINE

## 2018-07-27 PROCEDURE — 25000132 ZZH RX MED GY IP 250 OP 250 PS 637: Performed by: HOSPITALIST

## 2018-07-27 PROCEDURE — 85610 PROTHROMBIN TIME: CPT | Performed by: INTERNAL MEDICINE

## 2018-07-27 PROCEDURE — 12000000 ZZH R&B MED SURG/OB

## 2018-07-27 PROCEDURE — 25000125 ZZHC RX 250: Performed by: PHYSICIAN ASSISTANT

## 2018-07-27 RX ORDER — HEPARIN SODIUM 5000 [USP'U]/.5ML
5000 INJECTION, SOLUTION INTRAVENOUS; SUBCUTANEOUS EVERY 12 HOURS
Status: DISCONTINUED | OUTPATIENT
Start: 2018-07-27 | End: 2018-07-30 | Stop reason: HOSPADM

## 2018-07-27 RX ORDER — PANTOPRAZOLE SODIUM 40 MG/1
40 TABLET, DELAYED RELEASE ORAL
Status: DISCONTINUED | OUTPATIENT
Start: 2018-07-28 | End: 2018-07-30 | Stop reason: HOSPADM

## 2018-07-27 RX ORDER — WARFARIN SODIUM 7.5 MG/1
7.5 TABLET ORAL
Status: COMPLETED | OUTPATIENT
Start: 2018-07-27 | End: 2018-07-27

## 2018-07-27 RX ADMIN — PANTOPRAZOLE SODIUM 40 MG: 40 INJECTION, POWDER, FOR SOLUTION INTRAVENOUS at 08:25

## 2018-07-27 RX ADMIN — UMECLIDINIUM 62.5 MCG: 62.5 AEROSOL, POWDER ORAL at 11:09

## 2018-07-27 RX ADMIN — HEPARIN SODIUM 5000 UNITS: 5000 INJECTION, SOLUTION INTRAVENOUS; SUBCUTANEOUS at 16:09

## 2018-07-27 RX ADMIN — WARFARIN SODIUM 7.5 MG: 7.5 TABLET ORAL at 18:09

## 2018-07-27 RX ADMIN — ATORVASTATIN CALCIUM 10 MG: 10 TABLET, FILM COATED ORAL at 21:33

## 2018-07-27 RX ADMIN — HYDROXYZINE HYDROCHLORIDE 25 MG: 25 TABLET, FILM COATED ORAL at 14:02

## 2018-07-27 RX ADMIN — IRON SUCROSE 300 MG: 20 INJECTION, SOLUTION INTRAVENOUS at 12:08

## 2018-07-27 ASSESSMENT — ACTIVITIES OF DAILY LIVING (ADL)
ADLS_ACUITY_SCORE: 9
ADLS_ACUITY_SCORE: 11
ADLS_ACUITY_SCORE: 12
ADLS_ACUITY_SCORE: 12
ADLS_ACUITY_SCORE: 9
ADLS_ACUITY_SCORE: 9

## 2018-07-27 NOTE — PHARMACY-ANTICOAGULATION SERVICE
Clinical Pharmacy - Warfarin Dosing Consult     Pharmacy has been consulted to manage this patient s warfarin therapy.  Indication: Atrial Fibrillation  Therapy Goal: INR 2-3  Warfarin Prior to Admission: Yes  Warfarin PTA Regimen: 5 mg daily  Recent documented change in oral intake/nutrition: Unknown    INR   Date Value Ref Range Status   07/27/2018 1.56 (H) 0.86 - 1.14 Final   07/26/2018 1.73 (H) 0.86 - 1.14 Final       Recommend warfarin 7.5 mg today.  Pharmacy will monitor Hermilo MADRID Leakevin daily and order warfarin doses to achieve specified goal.      Please contact pharmacy as soon as possible if the warfarin needs to be held for a procedure or if the warfarin goals change.

## 2018-07-27 NOTE — PROGRESS NOTES
Pt A/O x4. LS coarse and wheeze. SBA to bathroom/chair. O2 at 95% with 2L. IVF infusing at 50ml/hr. Denies pain.Vitals stable.Tolerated good regular diet. Hbg stable at 7.5. had kenalog cream on his back for itching. Skin dry and cool. Numbness and tingling on feet. Calm and cooperative with care. Continue to monitor.   Loan Wong

## 2018-07-27 NOTE — PLAN OF CARE
Problem: Patient Care Overview  Goal: Plan of Care/Patient Progress Review  Outcome: No Change  Patient A/Ox4. VSS except tachycardic at times. Up SBA.  LS clear, reports no SOB. On 2LO2 originally dropping down to the mid 80's bumped up to 3LO2 during the night tolerating in the lower 90's . Tele NSR. NS running at 50ml/hr. Tolerating regular diet. Denies pain N/V. BS present. Dry rash on back, no complaints of itchiness this shift. PCD's on. HGB stable at 7.6. No signs of active bleeding. INR 1.73 making improvement. Discharge pending normalized INR level. Nursing continue to monitor.

## 2018-07-27 NOTE — PROGRESS NOTES
Called HEMAL Sharma to clarify when coumadin can be restarted.  He stated it could be restarted today.  Text paged Dr. Beaver with this update.

## 2018-07-27 NOTE — PLAN OF CARE
Problem: Patient Care Overview  Goal: Plan of Care/Patient Progress Review  Outcome: Improving  Requiring oxygen this morning.  Activity increased, walked in bartlett and up to chair most of the day.  Crackles heard on lungs bases.  Denies SOB even with activity.  IVF d/c'd.  Use of incentive spirometer hourly.  Oxygen weaned off and room air sat 90-92%.  Hgb stable at 7.4.  Will restart coumadin this evening and start heparin injection this afternoon.  Good intake.  No BM, is passing flatus.  Denies pain.  Received iron sucrose, BP stable.  Tele SR with BBB.  PT and OT ordered.  Possible discharge tomorrow.

## 2018-07-27 NOTE — PROGRESS NOTES
[facility]    Hospitalist Progress Note    Assessment & Plan   Hermilo Velasquez is a 85 year old male who presents with dizziness for 1 week, found to have low blood pressure and acute worsening of his anemia     Relative hypotension, bradycardia  Underlying rhythm is sinus bradycardia  Improved with fluids\no obvious infection present  - hold blood presssure medications  -blood pressures continue to be soft still       Acute on chronic anemia from iron deficiency  Drop from 10.7   Has become microcytic, iron studies consistent with fe deficiency  No obvious source of bleeding, on warfarin  Presumably GI, last colon was 5 years ago and had polyps  Also has severe heartburn, they have been discontinued on the PPI because of allergy by the allergist and so has been taking zantac  - appreciate GI input  -status post colonoscopy and esophagogastroduodenoscopy  , multiple spots of angiectasia noted, possibly causing bleeding, no active bleeding noted   Monitor hemoglobin  and transfuse as needed.  -cbc in am , will give iv venofer today      Acute kidney injury  Likely prerenal,resolved     History of DVT/PE  - due to recent gastrointestinal blood loss will restart warfarin with out bridging   -continue heparin deep vein thrombosis px dose for now      H/o Aortive valve replacement, bioprosthetic  - noted     GERD  - zantac     Cough  History of COPD  CXR clear  Diastolic heart failure   - echocardiogram noted  - stop hydration ,monitor closely due to recent fluid shift with bowel prep etc   - continue home meds     Pruritus  - resumed home medications    DVT Prophylaxis: will start back on warfarin , international normalized ratio  1.5 today   Code Status: Full Code     Disposition: Expected discharge possibly home 7/28 after  physical therapy/ot evaluation.      Aishwarya Beaver MD  Text Page   (7am to 6pm)    Interval History   Had esophagogastroduodenoscopy  And colonoscopy yesterday, tired and had  mild shortness of breath  Today, hemoglobin  Stable, he is insisting on going home, physical therapy  Evaluation pending, he lives at home with wife .no bleeding overnight, tolerating diet well. few areas of angiectasia noted in scope.     -Data reviewed today: I reviewed all new labs and imaging results over the last 24 hours. I personally reviewed     Physical Exam   Temp: 99.7  F (37.6  C) Temp src: Axillary BP: 116/58   Heart Rate: 99 Resp: 16 SpO2: 90 % O2 Device: None (Room air) Oxygen Delivery: 3 LPM  Vitals:    07/23/18 1709 07/23/18 1711 07/23/18 1946   Weight: 78.5 kg (173 lb) 79.4 kg (175 lb) 61.3 kg (135 lb 2.3 oz)     Vital Signs with Ranges  Temp:  [97.8  F (36.6  C)-99.7  F (37.6  C)] 99.7  F (37.6  C)  Heart Rate:  [] 99  Resp:  [8-24] 16  BP: (115-144)/(58-93) 116/58  SpO2:  [82 %-95 %] 90 %  I/O last 3 completed shifts:  In: 1384.17 [I.V.:1384.17]  Out: 250 [Urine:250]    Constitutional:Awake, alert, cooperative, no apparent distress  Respiratory: Clear to auscultation bilaterally, no crackles or wheezing  Cardiovascular: Regular rate and rhythm, normal S1 and S2, and no murmur noted  GI: Normal bowel sounds, soft, non-distended, non-tender  Skin/Integumen: No rashes, no cyanosis, no edema  Neuro : moving all 4 extremities, no focal deficit noted     Medications     sodium chloride Stopped (07/27/18 0830)     Warfarin Therapy Reminder         atorvastatin  10 mg Oral At Bedtime     [START ON 7/28/2018] pantoprazole  40 mg Oral QAM AC     umeclidinium  62.5 mcg Inhalation Daily       Data     Recent Labs  Lab 07/27/18  0910 07/26/18  0808 07/25/18  0803  07/24/18  0805 07/23/18  1715   WBC 10.3 7.5 6.9  --  7.0 7.7   HGB 7.4* 7.6* 7.5*  < > 7.2* 7.8*   MCV 68* 67* 68*  --  67* 67*    210 174  --  201 202   INR  --  1.73* 2.02*  --  2.34* 2.43*    143 142  --  143 140   POTASSIUM 4.2 3.9 4.3  --  4.4 4.4   CHLORIDE 108 109 112*  --  111* 107   CO2 24 25 26  --  24 26   BUN 7 7 14   --  17 24   CR 1.12 1.06 1.13  --  1.26* 1.42*   ANIONGAP 7 9 4  --  8 7   AMRITA 7.9* 8.3* 8.0*  --  7.8* 8.2*   * 94 92  --  83 109*   TROPI  --   --   --   --   --  <0.015   < > = values in this interval not displayed.    Recent Labs  Lab 07/27/18  0910 07/26/18  0808 07/25/18  0803 07/24/18  0805 07/23/18  1715   * 94 92 83 109*       Imaging:   No results found for this or any previous visit (from the past 24 hour(s)).

## 2018-07-27 NOTE — PLAN OF CARE
Problem: Patient Care Overview  Goal: Plan of Care/Patient Progress Review  Pt A/O x4. VSS on RA. Up with SBA. Pt denies pain. Regular diet. Activity encouraged, ambulated unit x1. I/S encouraged. LS coarse at bases with exp wheeze posteriorly. Voiding adequately. IV SL. Hbg 7.4 today. Warfarin restarted. Anticipated D/C per MD tomorrow after PT eval. Nursing continue to monitor.    Addendum: Placed on 2L of O2 as pt was at 88% while sleeping.

## 2018-07-27 NOTE — PLAN OF CARE
Problem: Patient Care Overview  Goal: Plan of Care/Patient Progress Review  Outcome: Improving  Pt A/O x4. VSS on 2L. LS exp coarse/wheeze. Up with SBA to bathroom/chair for dinner. Pt denies pain/SOB/nausea. Regular diet, tolerating. Voiding adequately. IVF infusing @50ml/hr.Hbg is stable at 7.5. Colonoscopy/EGD completed today. 1930 upon entering room pt has O2 NC off, O2 down to 80%, pt improved back up to 95% on 2L. PRN atarax and Rx cream for itching rash on back, with relief. Nursing continue to monitor.

## 2018-07-27 NOTE — PROGRESS NOTES
"GASTROENTEROLOGY PROGRESS NOTE     SUBJECTIVE: Feeling well. No signs of bleeding overnight. Tolerating diet.      OBJECTIVE:   /58 (BP Location: Left arm)  Pulse 61  Temp 99.7  F (37.6  C) (Axillary)  Resp 16  Ht 1.778 m (5' 10\")  Wt 61.3 kg (135 lb 2.3 oz)  SpO2 94%  BMI 19.39 kg/m2   Temp (24hrs), Av.8  F (37.1  C), Min:97.8  F (36.6  C), Max:99.7  F (37.6  C)     No data found.     Intake/Output Summary (Last 24 hours) at 18 0849  Last data filed at 18 0700   Gross per 24 hour   Intake          1846.17 ml   Output              250 ml   Net          1596.17 ml      PHYSICAL EXAM   Constitutional: Age appropriate, no acute distress  Abdomen: Soft, non-tender, non-distended, normally active bowel sounds. No masses or hepatosplenomegaly appreciated. No guarding or rebound tenderness.    Additional Comments:   ROS, FH, SH: See initial GI consult for details.     I have reviewed the patient's new clinical lab results:   Recent Labs   Lab Test  18   0808  18   0803  18   1805  18   0805   WBC  7.5  6.9   --   7.0   HGB  7.6*  7.5*  7.6*  7.2*   MCV  67*  68*   --   67*   PLT  210  174   --   201   INR  1.73*  2.02*   --   2.34*      Recent Labs   Lab Test  18   0808  18   0803  18   0805   NA  143  142  143   POTASSIUM  3.9  4.3  4.4   CHLORIDE  109  112*  111*   CO2     BUN  7  14  17   CR  1.06  1.13  1.26*   ANIONGAP  9  4  8   AMRITA  8.3*  8.0*  7.8*      Recent Labs   Lab Test  08/10/17   1220  17   0956  16   1553 16   2300   09/03/15   0550   07/30/15   1545   14   1006   ALBUMIN  3.1*   --   2.9*   --   2.9*   < >   --    < >   --    < >  3.7   BILITOTAL  0.4   --   0.4   --   0.3   < >   --    < >   --    < >  0.3   ALT  16  <5*  26  12  21   < >   --    < >   --    < >  14   AST  16   --   17  15  12   < >   --    < >   --    < >  18   ALKPHOS  91   --   64   --   92   < >   --    < >   --    < >  " 92   PROTEIN   --    --    --    --    --    --   Negative   --   10*   --   Negative    < > = values in this interval not displayed.   5/22/12 Colonoscopy ([facility])  Findings:  Polyp location: ascending/transverse colon.  Quantity: 5.  Size:  3-5 mm.  Polyp shape:  sessile.         Maneuver: polypectomy was performed with a cold biopsy forceps and hot snare.       Removal:  complete.  Retrieval: complete.  Bleeding: none.  Polyp location: sigmoid.  Quantity: 1.  Size: 5 mm.  Polyp shape: sessile.         Maneuver: polypectomy was performed with a  hot snare .       Removal: complete.  Retrieval: complete.  Bleeding: none.  1 transverse colon polyp at the site of the previously placed tattoo (removed as described above).  Diverticulosis.  Location: - sigmoid.    Description:  moderate.    Size:  medium.    Quantity:  several.  No inflammation present.  Angioectasia.  Location - ascending.   Number:  few.  No bleeding.     11/27/15 EGD ([facility])  Findings:        The esophagus was normal. The z-line was located at 38cm.        The stomach was normal.        The examined duodenum was normal. There were a few traces of red blood        in the duodenum but no identifiable efects.         6/7/16 CT Colonography  IMPRESSION:  1. There are four very small potential colonic polyps identified as  detailed above. The largest is 0.5 cm at the hepatic flexure. The  provided clinical history indicates melena/gastrointestinal  hemorrhage. Please note that CT colonoscopy is intended for screening,  and not intended for diagnostic workup in the setting of a symptomatic  patient. Recommend consideration of colonoscopy, and other modalities  for small bowel workup.  2. An indeterminant right lower lobe base 1.2 cm groundglass nodule.  The overall size is likely not substantially changed since 7/31/2015,  though its internal density has increased. Therefore, cannot exclude  some degree of progression of a potential pulmonary  neoplasm.  Recommend further oncologic workup. If no intervention is to be  performed, recommend short interval followup CT chest in three months.  3. Borderline infrarenal abdominal aortic aneurysm measuring 3 cm.     7/26/18 Colonoscopy ([facility])  Findings:        Three small angioectasias without bleeding were found in the cecum.        Five sessile polyps were found in the transverse colon. The polyps were        4 to 5 mm in size. These polyps were removed with a cold snare.        Resection and retrieval were complete.        Two sessile polyps were found in the sigmoid colon. The polyps were 5 to        8 mm in size.        The exam was otherwise without abnormality.     7/26/18 Upper Endoscopy ([facility])  Findings:        The esophagus was normal.        A few 1 to 2 mm no bleeding angioectasias were found in the stomach.        Two 2 to 3 mm angioectasias without bleeding were found in the second        portion of the duodenum.     Assessment:  85 year old male with dizziness and hypotension found to have slightly worsening anemia. Hgb stable, no signs of active bleeding. EGD/Colonoscopy as above. Pt found to have multiple angioectasias that were non bleeding but likely are contributing to his anemia since he requires anticoagulation. Would not treat them unless they are actively bleeding.     Plan:    -Diet as tolerated  -IV iron PRN  -PPI daily  -If active bleeding is seen would suggest tagged RBC scan  -Will contact pt with the pathology when available  -Discharge planning?  -No further GI recommendations at this time. Will sign off. Please call with any questions.    Charlie Viveros PA-C  [facility]  Cell:  [phone] Monday through Friday 2844-8256  Office: [phone]

## 2018-07-27 NOTE — PROVIDER NOTIFICATION
Text paged Dr. Beaver.  Patient has been requiring oxygen through the night.  Room air sat this morning is the mid 80's.  Now on 3 lpm nasal cannula with sats low 90's.  Lungs have crackles in bases.  I stopped IVF until clarified.  He is eating and drinking.  Will increase activity and give IS.  Have patient up in chair and will teach IS use.

## 2018-07-28 ENCOUNTER — APPOINTMENT (OUTPATIENT)
Dept: GENERAL RADIOLOGY | Facility: CLINIC | Age: 83
DRG: 812 | End: 2018-07-28
Attending: INTERNAL MEDICINE
Payer: COMMERCIAL

## 2018-07-28 ENCOUNTER — APPOINTMENT (OUTPATIENT)
Dept: OCCUPATIONAL THERAPY | Facility: CLINIC | Age: 83
DRG: 812 | End: 2018-07-28
Attending: INTERNAL MEDICINE
Payer: COMMERCIAL

## 2018-07-28 LAB
ANION GAP SERPL CALCULATED.3IONS-SCNC: 6 MMOL/L (ref 3–14)
BUN SERPL-MCNC: 9 MG/DL (ref 7–30)
CALCIUM SERPL-MCNC: 7.9 MG/DL (ref 8.5–10.1)
CHLORIDE SERPL-SCNC: 106 MMOL/L (ref 94–109)
CO2 SERPL-SCNC: 26 MMOL/L (ref 20–32)
CREAT SERPL-MCNC: 1.02 MG/DL (ref 0.66–1.25)
ERYTHROCYTE [DISTWIDTH] IN BLOOD BY AUTOMATED COUNT: 17.7 % (ref 10–15)
GFR SERPL CREATININE-BSD FRML MDRD: 69 ML/MIN/1.7M2
GLUCOSE SERPL-MCNC: 117 MG/DL (ref 70–99)
HCT VFR BLD AUTO: 24.1 % (ref 40–53)
HGB BLD-MCNC: 7.1 G/DL (ref 13.3–17.7)
INR PPP: 1.4 (ref 0.86–1.14)
MCH RBC QN AUTO: 19.8 PG (ref 26.5–33)
MCHC RBC AUTO-ENTMCNC: 29.5 G/DL (ref 31.5–36.5)
MCV RBC AUTO: 67 FL (ref 78–100)
PLATELET # BLD AUTO: 193 10E9/L (ref 150–450)
POTASSIUM SERPL-SCNC: 4.4 MMOL/L (ref 3.4–5.3)
RBC # BLD AUTO: 3.59 10E12/L (ref 4.4–5.9)
SODIUM SERPL-SCNC: 138 MMOL/L (ref 133–144)
URATE SERPL-MCNC: 4.4 MG/DL (ref 3.5–7.2)
WBC # BLD AUTO: 11 10E9/L (ref 4–11)

## 2018-07-28 PROCEDURE — 80048 BASIC METABOLIC PNL TOTAL CA: CPT | Performed by: HOSPITALIST

## 2018-07-28 PROCEDURE — 99207 ZZC CDG-MDM COMPONENT: MEETS LOW - DOWN CODED: CPT | Performed by: INTERNAL MEDICINE

## 2018-07-28 PROCEDURE — 84550 ASSAY OF BLOOD/URIC ACID: CPT | Performed by: HOSPITALIST

## 2018-07-28 PROCEDURE — 94640 AIRWAY INHALATION TREATMENT: CPT

## 2018-07-28 PROCEDURE — 94640 AIRWAY INHALATION TREATMENT: CPT | Mod: 76

## 2018-07-28 PROCEDURE — 25000132 ZZH RX MED GY IP 250 OP 250 PS 637: Performed by: HOSPITALIST

## 2018-07-28 PROCEDURE — 25000132 ZZH RX MED GY IP 250 OP 250 PS 637: Performed by: INTERNAL MEDICINE

## 2018-07-28 PROCEDURE — 85027 COMPLETE CBC AUTOMATED: CPT | Performed by: HOSPITALIST

## 2018-07-28 PROCEDURE — 40000275 ZZH STATISTIC RCP TIME EA 10 MIN

## 2018-07-28 PROCEDURE — 73660 X-RAY EXAM OF TOE(S): CPT | Mod: RT

## 2018-07-28 PROCEDURE — 97530 THERAPEUTIC ACTIVITIES: CPT | Mod: GO

## 2018-07-28 PROCEDURE — 40000133 ZZH STATISTIC OT WARD VISIT

## 2018-07-28 PROCEDURE — 99232 SBSQ HOSP IP/OBS MODERATE 35: CPT | Performed by: INTERNAL MEDICINE

## 2018-07-28 PROCEDURE — 71046 X-RAY EXAM CHEST 2 VIEWS: CPT

## 2018-07-28 PROCEDURE — 73562 X-RAY EXAM OF KNEE 3: CPT | Mod: RT

## 2018-07-28 PROCEDURE — 12000000 ZZH R&B MED SURG/OB

## 2018-07-28 PROCEDURE — 97166 OT EVAL MOD COMPLEX 45 MIN: CPT | Mod: GO

## 2018-07-28 PROCEDURE — 25000128 H RX IP 250 OP 636: Performed by: INTERNAL MEDICINE

## 2018-07-28 PROCEDURE — 25000132 ZZH RX MED GY IP 250 OP 250 PS 637

## 2018-07-28 PROCEDURE — 85610 PROTHROMBIN TIME: CPT | Performed by: HOSPITALIST

## 2018-07-28 PROCEDURE — 36415 COLL VENOUS BLD VENIPUNCTURE: CPT | Performed by: HOSPITALIST

## 2018-07-28 PROCEDURE — 25000125 ZZHC RX 250: Performed by: INTERNAL MEDICINE

## 2018-07-28 RX ORDER — IPRATROPIUM BROMIDE AND ALBUTEROL SULFATE 2.5; .5 MG/3ML; MG/3ML
3 SOLUTION RESPIRATORY (INHALATION) EVERY 4 HOURS PRN
Status: DISCONTINUED | OUTPATIENT
Start: 2018-07-28 | End: 2018-07-30 | Stop reason: HOSPADM

## 2018-07-28 RX ORDER — WARFARIN SODIUM 5 MG/1
5 TABLET ORAL
Status: COMPLETED | OUTPATIENT
Start: 2018-07-28 | End: 2018-07-28

## 2018-07-28 RX ORDER — METHYLPREDNISOLONE SODIUM SUCCINATE 125 MG/2ML
60 INJECTION, POWDER, LYOPHILIZED, FOR SOLUTION INTRAMUSCULAR; INTRAVENOUS ONCE
Status: COMPLETED | OUTPATIENT
Start: 2018-07-28 | End: 2018-07-28

## 2018-07-28 RX ORDER — ACETAMINOPHEN 325 MG/1
650 TABLET ORAL EVERY 4 HOURS PRN
Status: DISCONTINUED | OUTPATIENT
Start: 2018-07-28 | End: 2018-07-30 | Stop reason: HOSPADM

## 2018-07-28 RX ORDER — ACETAMINOPHEN 650 MG/1
650 SUPPOSITORY RECTAL EVERY 4 HOURS PRN
Status: DISCONTINUED | OUTPATIENT
Start: 2018-07-28 | End: 2018-07-30 | Stop reason: HOSPADM

## 2018-07-28 RX ORDER — HYDROCODONE BITARTRATE AND ACETAMINOPHEN 5; 325 MG/1; MG/1
1 TABLET ORAL EVERY 6 HOURS PRN
Status: DISCONTINUED | OUTPATIENT
Start: 2018-07-28 | End: 2018-07-30 | Stop reason: HOSPADM

## 2018-07-28 RX ADMIN — METHYLPREDNISOLONE SODIUM SUCCINATE 62.5 MG: 125 INJECTION, POWDER, FOR SOLUTION INTRAMUSCULAR; INTRAVENOUS at 18:01

## 2018-07-28 RX ADMIN — HEPARIN SODIUM 5000 UNITS: 5000 INJECTION, SOLUTION INTRAVENOUS; SUBCUTANEOUS at 04:35

## 2018-07-28 RX ADMIN — ACETAMINOPHEN 650 MG: 325 TABLET, FILM COATED ORAL at 09:09

## 2018-07-28 RX ADMIN — WARFARIN SODIUM 5 MG: 5 TABLET ORAL at 18:01

## 2018-07-28 RX ADMIN — PANTOPRAZOLE SODIUM 40 MG: 40 TABLET, DELAYED RELEASE ORAL at 06:33

## 2018-07-28 RX ADMIN — HYDROCODONE BITARTRATE AND ACETAMINOPHEN 1 TABLET: 5; 325 TABLET ORAL at 21:31

## 2018-07-28 RX ADMIN — IPRATROPIUM BROMIDE AND ALBUTEROL SULFATE 3 ML: .5; 3 SOLUTION RESPIRATORY (INHALATION) at 11:58

## 2018-07-28 RX ADMIN — ACETAMINOPHEN 650 MG: 325 TABLET, FILM COATED ORAL at 01:31

## 2018-07-28 RX ADMIN — TRIAMCINOLONE ACETONIDE: 1 CREAM TOPICAL at 11:21

## 2018-07-28 RX ADMIN — HEPARIN SODIUM 5000 UNITS: 5000 INJECTION, SOLUTION INTRAVENOUS; SUBCUTANEOUS at 16:03

## 2018-07-28 RX ADMIN — ATORVASTATIN CALCIUM 10 MG: 10 TABLET, FILM COATED ORAL at 21:28

## 2018-07-28 RX ADMIN — HYDROCODONE BITARTRATE AND ACETAMINOPHEN 1 TABLET: 5; 325 TABLET ORAL at 11:22

## 2018-07-28 RX ADMIN — HYDROXYZINE HYDROCHLORIDE 25 MG: 25 TABLET, FILM COATED ORAL at 09:10

## 2018-07-28 RX ADMIN — HYDROCODONE BITARTRATE AND ACETAMINOPHEN 1 TABLET: 5; 325 TABLET ORAL at 05:09

## 2018-07-28 RX ADMIN — ACETAMINOPHEN 650 MG: 325 TABLET, FILM COATED ORAL at 14:06

## 2018-07-28 RX ADMIN — UMECLIDINIUM 62.5 MCG: 62.5 AEROSOL, POWDER ORAL at 09:08

## 2018-07-28 ASSESSMENT — ACTIVITIES OF DAILY LIVING (ADL)
PREVIOUS_RESPONSIBILITIES: MEAL PREP;HOUSEKEEPING;LAUNDRY;SHOPPING;YARDWORK;MEDICATION MANAGEMENT;FINANCES;DRIVING
ADLS_ACUITY_SCORE: 13
ADLS_ACUITY_SCORE: 11
ADLS_ACUITY_SCORE: 13
ADLS_ACUITY_SCORE: 12
ADLS_ACUITY_SCORE: 12
ADLS_ACUITY_SCORE: 13

## 2018-07-28 NOTE — PLAN OF CARE
Problem: Patient Care Overview  Goal: Plan of Care/Patient Progress Review  PT:  Orders received, hold PT eval. Pt is going to be seen by ortho for R leg pain, not appropriate for PT eval at this time.

## 2018-07-28 NOTE — PLAN OF CARE
Problem: Patient Care Overview  Goal: Plan of Care/Patient Progress Review  Outcome: No Change  A/O x4. VSS on 2L nc overnight. Tele SR with BBB. C/o right great toe pain, pt stated that he stubbed his toe while walking the halls of the unit yesterday. Tylenol and cold pack given and pt slept well for awhile however pain worsened later this morning (see previous note). MD was notified and an xray was done with results pending now. Norco given with adequate relief. On a different note, pt's last Hb 7.4. No signs of bleeding. GI has signed off. Possibly home today after PT and OT see. Nursing will continue to monitor.

## 2018-07-28 NOTE — PROGRESS NOTES
[facility]    Hospitalist Progress Note    Assessment & Plan   Hermilo Velasquez is a 85 year old male who presents with dizziness for 1 week, found to have low blood pressure and acute worsening of his anemia      Relative hypotension, bradycardia  Underlying rhythm is sinus bradycardia  Improved with fluids\no obvious infection present  - hold blood presssure medications  -blood pressures continue to be soft still         Acute on chronic anemia from iron deficiency  Drop from 10.7   Has become microcytic, iron studies consistent with fe deficiency  No obvious source of bleeding, on warfarin  Presumably GI, last colon was 5 years ago and had polyps  Also has severe heartburn, they have been discontinued on the PPI because of allergy by the allergist and so has been taking zantac  - appreciate GI input  -status post colonoscopy and esophagogastroduodenoscopy, multiple spots of angiectasia noted, possibly causing bleeding, no active bleeding noted   Monitor hemoglobin  and transfuse as needed. Hb has been stable in 7 range since asmission  -received dose of IV Venofer 300mg X 1 on 7/27  -close f/u PCP with follow CBC, seek medical attention if bloody stools  - restarted coumadin without bridging  -per GI: If active bleeding is seen in future would suggest tagged RBC scan      Acute kidney injury  Likely prerenal,resolved      History of DVT/PE  - due to recent gastrointestinal blood loss will restart warfarin with out bridging  -INR 1.4 today   -continue heparin deep vein thrombosis px dose for now       H/o Aortive valve replacement, bioprosthetic  - noted      GERD  - zantac      Cough  History of COPD  CXR clear  Diastolic heart failure   - echocardiogram noted: 7/24  The left ventricle is normal in size.  The visual ejection fraction is estimated at 50-55%.  Diastolic Doppler findings (E/E' ratio and/or other parameters) suggest left  ventricular filling pressures are increased.  No regional  wall motion abnormalities noted.  The right ventricle is moderately dilated.  Mildly decreased right ventricular systolic function  There is a bioprosthetic aortic valve.  The prosthetic aortic valve appears to open well.  The gradient is normal for this prosthetic aortic valve.    - stopped hydration ,monitor closely due to recent fluid shift with bowel prep etc   -CXR clear 7/23  - continue home meds      Acute R knee pain  R great toe pain  Imaging XR of R knee and R foot without fracture  + R knee effusion noted  Wbc up but nl. Afebrile. Not appear toxic/ill.  Discussed with ortho  Suspect gout/pseudogout. Possible component of hemarthrosis  inr 1.4. Ortho not feel arthrocentesis indicated at this time  Proceed with steroid Rx for gout/pseudogout  -solumedrol 60mg iv X 1 now then reassess in am. Likely tapering 7-10 course  wbat  PT, OT  Ice tid to qid  Elevate RLE  Disposition per PT, OT. Unable to dc home today given acute knee pain and toe pain    Pruritus  - resumed home medications     DVT Prophylaxis: will start back on warfarin , international normalized ratio  1.4 today     Code Status: Full Code      Dispo: unable to dc home at this time per OT. Has walker at home.       Chris Alvarado MD  Text Page  (7am to 6pm)  Interval History   Stubbed R toe walking yesterday. No fall but had to catch himself and may have turned his right knee  Acute R knee pain with swelling last night and new R great toe pain.   No fever,chills, no abd pain.   No hx of gout  Feeling well otherwise.   Seen by OT  No bleeding.     -Data reviewed today: I reviewed all new labs and imaging results over the last 24 hours. I personally reviewed labs since admission and imaging    Physical Exam   Temp: 98.5  F (36.9  C) Temp src: Oral BP: 111/59   Heart Rate: 77 Resp: 18 SpO2: 92 % O2 Device: Nasal cannula Oxygen Delivery: 2 LPM  Vitals:    07/23/18 1709 07/23/18 1711 07/23/18 1946   Weight: 78.5 kg (173 lb) 79.4 kg (175 lb) 61.3 kg  (135 lb 2.3 oz)     Vital Signs with Ranges  Temp:  [98.2  F (36.8  C)-99.4  F (37.4  C)] 98.5  F (36.9  C)  Heart Rate:  [77-98] 77  Resp:  [16-20] 18  BP: (103-128)/(50-61) 111/59  SpO2:  [90 %-93 %] 92 %  I/O last 3 completed shifts:  In: 940 [P.O.:420; I.V.:520]  Out: 1420 [Urine:1420]    Constitutional: Nad, nontoxic, sitting up in chair, appears comfortable  Respiratory: Distant BS bilaterally, trace wheeze, no crackles.   Cardiovascular: RRR no r/g/m  GI: soft, nt, nd. Nl BS  Skin/Integumen: no rash or edema  Neuro: alert, oriented, nl speech  MSK: R knee with effusion, pain with passive rom  R great toe with tenderness to palplation- red and swollen. No pain with int/ext rotation R hip or rom of ankle or other toes.     Medications     Warfarin Therapy Reminder         atorvastatin  10 mg Oral At Bedtime     heparin  5,000 Units Subcutaneous Q12H     pantoprazole  40 mg Oral QAM AC     umeclidinium  62.5 mcg Inhalation Daily     warfarin  5 mg Oral ONCE at 18:00       Data     Recent Labs  Lab 07/28/18  0851 07/27/18  1119 07/27/18  0910 07/26/18  0808  07/23/18  1715   WBC 11.0  --  10.3 7.5  < > 7.7   HGB 7.1*  --  7.4* 7.6*  < > 7.8*   MCV 67*  --  68* 67*  < > 67*     --  205 210  < > 202   INR 1.40* 1.56*  --  1.73*  < > 2.43*     --  139 143  < > 140   POTASSIUM 4.4  --  4.2 3.9  < > 4.4   CHLORIDE 106  --  108 109  < > 107   CO2 26  --  24 25  < > 26   BUN 9  --  7 7  < > 24   CR 1.02  --  1.12 1.06  < > 1.42*   ANIONGAP 6  --  7 9  < > 7   AMRITA 7.9*  --  7.9* 8.3*  < > 8.2*   *  --  131* 94  < > 109*   TROPI  --   --   --   --   --  <0.015   < > = values in this interval not displayed.    Imaging:   Recent Results (from the past 24 hour(s))   XR Toe Right G/E 2 Views    Narrative    RIGHT FIRST TOE THREE VIEWS   7/28/2018 6:22 AM     HISTORY: Right great toe pain.    COMPARISON: None.      Impression    IMPRESSION: Mild degenerative changes are noted involving the right  first MTP  joint. No evidence for acute fracture or dislocation in the  right great toe. No convincing radiographic evidence for  osteomyelitis.

## 2018-07-28 NOTE — CONSULTS
[facility] Orthopedic Consultation    Hermilo Velasquez MRN# [id number]   Age: 85 year old YOB: 1932     Date of Admission:  7/23/2018    Reason for consult: R knee pain       Requesting physician: Dr. Alvarado                   Assessment and Plan:   Assessment:   - R knee pain and effusion, possible hemarthrosis, likely 2/2 injury, possibly due to pseudogout. ROM limited 2/2 effusion.  - Low suspicion for infection      Plan:   - Recommend prednisone for anti-inflammatory give GI bleed, maintain INR at normal levels to decrease chance of further hemarthrosis   - PT/OT for mobilization and ROM/strengthening  - WBAT  - ice, elevate  - can f/u with me as outpatient. can call my care coordinator Alisha at [phone] to schedule appointment           Chief Complaint:   R knee pain         History of Present Illness:   This patient is a 85 year old male who presents with the following condition requiring a hospital admission:    86 yo M who is admitted to the hospital with GI bleed. On coumadin but INR at 1.5 now. Yesterday he was walking the hallway when his R knee buckled on him. Since then he has developed swelling and pain in his R knee as well as difficulty with ROM. He denies any fevers, chills, night sweats or signs of infection. The patient is not sure if he stubbed his toe at the same time but he also has pain in his 1st MTP of his R great toe with some swelling and erythema since yesterday. No history of gout. Pain improved with ice.           Past Medical History:     Past Medical History:   Diagnosis Date     Aortic stenosis     Severe AS, 9/2015 AVR - [facility] TRIFECTA Bovine bioprosthesis 25MM TF-25A     Atrial fibrillation (H)     9/2015 Paroxysmal post op Afib - discharged on Warfarin and a beta blocker     Deep vein thrombosis (H)      GERD (gastroesophageal reflux disease)      Heart murmur      Monoclonal gammopathy     plasmacyte prominent causing monoclonal gammopathy     Need  for SBE (subacute bacterial endocarditis) prophylaxis      Neuropathy      Other and unspecified hyperlipidemia      Other malignant lymphomas     non hodgkin's lymphoma     RBBB (right bundle branch block)      Severe sepsis with acute organ dysfunction (H) 11/16/2015     Unspecified hereditary and idiopathic peripheral neuropathy              Past Surgical History:     Past Surgical History:   Procedure Laterality Date     APPENDECTOMY       AS TOTAL KNEE ARTHROPLASTY       BACK SURGERY       ESOPHAGOSCOPY, GASTROSCOPY, DUODENOSCOPY (EGD), COMBINED N/A 11/28/2015    Procedure: COMBINED ESOPHAGOSCOPY, GASTROSCOPY, DUODENOSCOPY (EGD);  Surgeon: Danis Castillo MD;  Location:  GI     ESOPHAGOSCOPY, GASTROSCOPY, DUODENOSCOPY (EGD), COMBINED N/A 7/26/2018    Procedure: COMBINED ESOPHAGOSCOPY, GASTROSCOPY, DUODENOSCOPY (EGD);;  Surgeon: Toby Dong DO;  Location:  GI     HERNIA REPAIR  2006     HERNIORRHAPHY VENTRAL  4/17/2013    Procedure: HERNIORRHAPHY VENTRAL;  VENTRAL HERNIA REPAIR WITH MESH;  Surgeon: Patel Guzman MD;  Location:  OR     KNEE SURGERY      arthroscopic right knee surgery      REPAIR LIGAMENT ANKLE  2/23/2012    Procedure:REPAIR LIGAMENT ANKLE; LEFT TARSAL TUNNEL RELEASE OF KNOT OF ARIEL RELEASE; Surgeon:SAUL PUENTE; Location: OR     REPLACE VALVE AORTIC N/A 9/3/2015    Procedure: REPLACE VALVE AORTIC;  Surgeon: Antonino Mitchell MD;  Location:  OR     ROTATOR CUFF REPAIR RT/LT      bilateral     SPINE SURGERY      3 spine surgeries     TONSILLECTOMY       TURP               Social History:     Social History   Substance Use Topics     Smoking status: Former Smoker     Packs/day: 2.00     Years: 55.00     Quit date: 1/22/1998     Smokeless tobacco: Never Used     Alcohol use 0.0 oz/week     0 Standard drinks or equivalent per week      Comment: occassionaly             Family History:     Family History   Problem Relation Age of Onset     C.A.D.  "Father      Emphysema Father      Melanoma No family hx of      Skin Cancer No family hx of              Immunizations:     VACCINE/DOSE   Diptheria   DPT   DTAP   HBIG   Hepatitis A   Hepatitis B   HIB   Influenza   Measles   Meningococcal   MMR   Mumps   Pneumococcal   Polio   Rubella   Small Pox   TDAP   Varicella   Zoster             Allergies:     Allergies   Allergen Reactions     Gabapentin      Swelling       Lidocaine Blisters     Allergy to lidocaine ointment     Lasix [Furosemide] Rash     Penicillins Rash     \"broke out from injection\" 60 yrs ago               Medications:     Current Facility-Administered Medications   Medication     acetaminophen (TYLENOL) tablet 650 mg    Or     acetaminophen (TYLENOL) Suppository 650 mg     albuterol (PROAIR HFA/PROVENTIL HFA/VENTOLIN HFA) Inhaler 1-2 puff     atorvastatin (LIPITOR) tablet 10 mg     camphor-menthol (DERMASARRA) lotion     heparin sodium PF injection 5,000 Units     HYDROcodone-acetaminophen (NORCO) 5-325 MG per tablet 1 tablet     hydrOXYzine (ATARAX) tablet 25 mg     ipratropium - albuterol 0.5 mg/2.5 mg/3 mL (DUONEB) neb solution 3 mL     melatonin tablet 1 mg     naloxone (NARCAN) injection 0.1-0.4 mg     ondansetron (ZOFRAN-ODT) ODT tab 4 mg    Or     ondansetron (ZOFRAN) injection 4 mg     pantoprazole (PROTONIX) EC tablet 40 mg     triamcinolone (KENALOG) 0.1 % cream     umeclidinium (INCRUSE ELLIPTA) 62.5 MCG/INH oral inhaler 62.5 mcg     warfarin (COUMADIN) tablet 5 mg     Warfarin Therapy Reminder (Check START DATE - warfarin may be starting in the FUTURE)             Review of Systems:   All review of systems was performed and was negative except as per hpi            Physical Exam:   All vitals have been reviewed  Patient Vitals for the past 24 hrs:   BP Temp Temp src Heart Rate Resp SpO2   07/28/18 1406 - - - - - 94 %   07/28/18 1158 - - - - - 96 %   07/28/18 1126 - - - - - 90 %   07/28/18 0735 111/59 98.5  F (36.9  C) Oral 77 18 92 % "   07/28/18 0545 - - - - 16 -   07/28/18 0509 - - - - 16 -   07/28/18 0046 - - - - - 92 %   07/28/18 0045 123/61 98.2  F (36.8  C) Oral 94 20 90 %   07/27/18 2223 - - - - - 92 %   07/27/18 1540 108/57 99.4  F (37.4  C) Oral 90 16 92 %       Intake/Output Summary (Last 24 hours) at 07/28/18 1426  Last data filed at 07/28/18 1406   Gross per 24 hour   Intake              420 ml   Output             1145 ml   Net             -725 ml     NAD  nonlabored breathing  No peripheral edema  Skin intact  White sclera  RLE:  +effusion of R knee  Diffuse tenderness to palpation about knee superiorly, medially, laterally, and posteriorly  Stable to v/v stress  ROM   Evaluation of toe demonstrates swelling, erythema, and ttp over the 1st MTP of the R toe  Pain with ROM of toe  Rest of foot wwp            Data:   All laboratory data reviewed  Results for orders placed or performed during the hospital encounter of 07/23/18   UPPER GI ENDOSCOPY   Result Value Ref Range    Upper GI Endoscopy       [facility] Endoscopy Department  _______________________________________________________________________________  Patient Name: Hermilo Velasquez        Procedure Date: 7/26/2018 1:15 PM  MRN: [id number]                       Account Number: [id number]  YOB: 1932              Admit Type: Inpatient  Age: 85                               Room: special procedure room #1  Note Status: Finalized                Attending MD: Toby Dong DO  Total Sedation Time:                  Instrument Name: 314 GIF- Gastroscope  _______________________________________________________________________________     Procedure:                Upper GI endoscopy  Indications:              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   _______________________________________________________________________________  Procedure:                Pre-Anesthesia Assessment:                            - The risks and benefits of the procedure and the                             sedation options and risks were discussed with the                             patient. All questions were answered and informed                             consent was obtained.                            - Patient identification and proposed procedure                             were verified prior to the procedure by the                             physician, the nurse and the anesthetist. The                             procedure was verified in the procedure room.                            After obtaining informed consent, the endoscope was                             passed under direct vision. Throughout the                             procedure, the patient's blood pressure, pulse, and                             oxygen saturations were mon itored continuously. The                             Endoscope was introduced through the mouth, and                             advanced to the second part of duodenum. The upper                             GI endoscopy was accomplished without difficulty.                             The patient tolerated the procedure well.                                                                                   Findings:       The esophagus was normal.       A few 1 to 2 mm no bleeding angioectasias were found in the stomach.       Two 2 to 3 mm angioectasias without bleeding were found in the second        portion of the duodenum.                                                                                   Impression:               - Angioectasias, perhaps contributing to anemia.  Procedure Code(s):       --- Professional ---       36956, Esophagogastroduodenoscopy,  flexible, transoral; diagnostic,        including collection of specimen(s) by brushing or washing, when        performe d (separate procedure)  Diagnosis Code(s):       --- Professional ---       K31.819, Angiodysplasia of stomach and duodenum without bleeding       D50.0, Iron deficiency anemia secondary to blood loss (chronic)    CPT copyright 2017 [employer]. All rights reserved.    The codes documented in this report are preliminary and upon  review may   be revised to meet current compliance requirements.    Toby Dong DO  _________________  Toby Dong DO  7/26/2018 2:02:14 PM  I was physically present for the entire viewing portion of the exam.  Toby Dong DO  Number of Addenda: 0    Note Initiated On: 7/26/2018 1:15 PM  MRN:                      [id number]  Procedure Date:           7/26/2018 1:15:38 PM  Total Procedure Duration: 0 hours 2 minutes 38 seconds   Estimated Blood Loss:       Scope In: 1:25:14 PM  Scope Out: 1:27:52 PM     COLONOSCOPY   Result Value Ref Range    COLONOSCOPY       [facility] Endoscopy Department  _______________________________________________________________________________  Patient Name: Hermilo Velasquez        Procedure Date: 7/26/2018 1:14 PM  MRN: [id number]                       Account Number: [id number]  YOB: 1932              Admit Type: Inpatient  Age: 85                               Room: special procedure room #1  Note Status: Finalized                Attending MD: Toby Dong DO  Total Sedation Time:                  Instrument Name: 323 [id number]   AdultColonoscope  _______________________________________________________________________________     Procedure:                Colonoscopy  Indications:              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 Dunn,  RN  Referring MD:               Medicines:                Monitored Anesthesia Care  Complications:             No immediate complications.  _______________________________________________________________________________  Procedure:                After obtaining informed consent, the colonoscope                             was passed under direct vision. Throughout the                             procedure, the patient's blood pressure, pulse, and                             oxygen saturations were monitored continuously. The                             Colonoscope was introduced through the anus and                             advanced to the cecum, identified by appendiceal                             orifice and ileocecal valve. The colonoscopy was                             performed without difficulty. The patient tolerated                             the procedure well. The quality of the bowel                             preparation was good.                                                                                   Findings:       Three small angioectasias without bleeding wer e found in the cecum.       Five sessile polyps were found in the transverse colon. The polyps were        4 to 5 mm in size. These polyps were removed with a cold snare.        Resection and retrieval were complete.       Two sessile polyps were found in the sigmoid colon. The polyps were 5 to        8 mm in size.       The exam was otherwise without abnormality.                                                                                   Impression:               - Three non-bleeding colonic angioectasias.                            - Five 4 to 5 mm polyps in the transverse colon,                             removed with a cold snare. Resected and retrieved.                            - Two 5 to 8 mm polyps in the sigmoid colon.                            - The examination was otherwise normal.  Recommendation:            - Await biopsy results.                            - Return to hospital [facility], AD[facility].                            - Further colonoscopies are not recommended for                              this patient.                            - Please call with questions. Will sign off.                                                                                   Procedure Code(s):       --- Professional ---       62361, Colonoscopy, flexible; with removal of tumor(s), polyp(s), or        other lesion(s) by snare technique  Diagnosis Code(s):       --- Professional ---       D50.0, Iron deficiency anemia secondary to blood loss (chronic)       D12.5, Benign neoplasm of sigmoid colon       D12.3, Benign neoplasm of transverse colon (hepatic flexure or splenic        flexure)       K55.20, Angiodysplasia of colon without hemorrhage    CPT copyright 2017 [employer] Association. All rights reserved.    The codes documented in this report are preliminary and upon  review may   be revised to meet current compliance requirements.    Toby Dong DO  _________________  Toby Dong DO  7/26/2018 2:08:05 PM  I was physically present for the enti re viewing portion of the exam.  Toby Dong DO  Number of Addenda: 0    Note Initiated On: 7/26/2018 1:14 PM  MRN:                      [id number]  Procedure Date:           7/26/2018 1:14:44 PM  Scope Withdrawal Time: 0 hours 18 minutes 10 seconds   Total Procedure Duration: 0 hours 22 minutes 11 seconds   Estimated Blood Loss:       Scope In: 1:36:22 PM  Scope Out: 1:58:33 PM     XR Chest 2 Views    Narrative    CHEST TWO VIEWS  7/23/2018 5:39 PM     HISTORY: Chest pain.    COMPARISON: 2/12/2018      Impression    IMPRESSION: Flattening of the diaphragms. Sternal wires. Otherwise  normal. No change.    RAI BLOOD MD   XR Toe Right G/E 2 Views    Narrative    RIGHT FIRST TOE THREE VIEWS   7/28/2018 6:22 AM     HISTORY: Right great toe pain.    COMPARISON: None.       Impression    IMPRESSION: Mild degenerative changes are noted involving the right  first MTP joint. No evidence for acute fracture or dislocation in the  right great toe. No convincing radiographic evidence for  osteomyelitis.    GREG GRAJEDA MD   XR Knee Right 3 Views    Narrative    RIGHT KNEE THREE VIEWS  7/28/2018 1:44 PM     HISTORY: Knee pain and swelling. Rule out fracture. Appears to have  effusion.     COMPARISON: None.      Impression    IMPRESSION: Moderate tricompartmental degenerative changes with  marginal osteophyte formation. Moderate joint effusion. Vascular  calcifications. No evidence of acute fracture or subluxation. Small  ossicles adjacent to the tibial tubercle and superior patellar likely  soft tissue calcifications or related to prior injury.    URSULA BOTELLO MD   XR Chest 2 Views    Narrative    CHEST TWO VIEWS  7/28/2018 1:44 PM     HISTORY: History of COPD with mild hypoxia, rule out infiltrate.     COMPARISON: Chest x-ray from 7/23/2018.      Impression    IMPRESSION: Heart size is normal. Pulmonary vasculature is not  distended. Evidence of previous sternotomy and valve surgery. No focal  infiltrates or evidence of pleural fluid. No acute disease.   CBC with platelets differential   Result Value Ref Range    WBC 7.7 4.0 - 11.0 10e9/L    RBC Count 4.09 (L) 4.4 - 5.9 10e12/L    Hemoglobin 7.8 (L) 13.3 - 17.7 g/dL    Hematocrit 27.3 (L) 40.0 - 53.0 %    MCV 67 (L) 78 - 100 fl    MCH 19.1 (L) 26.5 - 33.0 pg    MCHC 28.6 (L) 31.5 - 36.5 g/dL    RDW 17.4 (H) 10.0 - 15.0 %    Platelet Count 202 150 - 450 10e9/L    Diff Method Automated Method     % Neutrophils 62.2 %    % Lymphocytes 13.4 %    % Monocytes 13.4 %    % Eosinophils 10.6 %    % Basophils 0.3 %    % Immature Granulocytes 0.1 %    Nucleated RBCs 0 0 /100    Absolute Neutrophil 4.8 1.6 - 8.3 10e9/L    Absolute Lymphocytes 1.0 0.8 - 5.3 10e9/L    Absolute Monocytes 1.0 0.0 - 1.3 10e9/L    Absolute Eosinophils 0.8 (H) 0.0 - 0.7  10e9/L    Absolute Basophils 0.0 0.0 - 0.2 10e9/L    Abs Immature Granulocytes 0.0 0 - 0.4 10e9/L    Absolute Nucleated RBC 0.0     RBC Fragments Slight     Ovalocytes Slight     Elliptocytes Slight     Platelet Estimate       Automated count confirmed.  Platelet morphology is normal.   Basic metabolic panel   Result Value Ref Range    Sodium 140 133 - 144 mmol/L    Potassium 4.4 3.4 - 5.3 mmol/L    Chloride 107 94 - 109 mmol/L    Carbon Dioxide 26 20 - 32 mmol/L    Anion Gap 7 3 - 14 mmol/L    Glucose 109 (H) 70 - 99 mg/dL    Urea Nitrogen 24 7 - 30 mg/dL    Creatinine 1.42 (H) 0.66 - 1.25 mg/dL    GFR Estimate 47 (L) >60 mL/min/1.7m2    GFR Estimate If Black 57 (L) >60 mL/min/1.7m2    Calcium 8.2 (L) 8.5 - 10.1 mg/dL   Troponin I   Result Value Ref Range    Troponin I ES <0.015 0.000 - 0.045 ug/L   INR   Result Value Ref Range    INR 2.43 (H) 0.86 - 1.14   Ferritin   Result Value Ref Range    Ferritin 8 (L) 26 - 388 ng/mL   Iron and iron binding capacity   Result Value Ref Range    Iron 15 (L) 35 - 180 ug/dL    Iron Binding Cap 368 240 - 430 ug/dL    Iron Saturation Index 4 (L) 15 - 46 %   Magnesium   Result Value Ref Range    Magnesium 2.1 1.6 - 2.3 mg/dL   Phosphorus   Result Value Ref Range    Phosphorus 3.9 2.5 - 4.5 mg/dL   CBC with platelets differential   Result Value Ref Range    WBC 7.0 4.0 - 11.0 10e9/L    RBC Count 3.66 (L) 4.4 - 5.9 10e12/L    Hemoglobin 7.2 (L) 13.3 - 17.7 g/dL    Hematocrit 24.4 (L) 40.0 - 53.0 %    MCV 67 (L) 78 - 100 fl    MCH 19.7 (L) 26.5 - 33.0 pg    MCHC 29.5 (L) 31.5 - 36.5 g/dL    RDW 17.3 (H) 10.0 - 15.0 %    Platelet Count 201 150 - 450 10e9/L    Diff Method Automated Method     % Neutrophils 63.6 %    % Lymphocytes 14.9 %    % Monocytes 10.3 %    % Eosinophils 10.8 %    % Basophils 0.3 %    % Immature Granulocytes 0.1 %    Nucleated RBCs 0 0 /100    Absolute Neutrophil 4.4 1.6 - 8.3 10e9/L    Absolute Lymphocytes 1.0 0.8 - 5.3 10e9/L    Absolute Monocytes 0.7 0.0 - 1.3  10e9/L    Absolute Eosinophils 0.8 (H) 0.0 - 0.7 10e9/L    Absolute Basophils 0.0 0.0 - 0.2 10e9/L    Abs Immature Granulocytes 0.0 0 - 0.4 10e9/L    Absolute Nucleated RBC 0.0     Anisocytosis Slight     RBC Fragments Slight     Elliptocytes Slight     Target Cells Slight     Platelet Estimate       Automated count confirmed.  Platelet morphology is normal.   Basic metabolic panel   Result Value Ref Range    Sodium 143 133 - 144 mmol/L    Potassium 4.4 3.4 - 5.3 mmol/L    Chloride 111 (H) 94 - 109 mmol/L    Carbon Dioxide 24 20 - 32 mmol/L    Anion Gap 8 3 - 14 mmol/L    Glucose 83 70 - 99 mg/dL    Urea Nitrogen 17 7 - 30 mg/dL    Creatinine 1.26 (H) 0.66 - 1.25 mg/dL    GFR Estimate 54 (L) >60 mL/min/1.7m2    GFR Estimate If Black 66 >60 mL/min/1.7m2    Calcium 7.8 (L) 8.5 - 10.1 mg/dL   INR   Result Value Ref Range    INR 2.34 (H) 0.86 - 1.14   Nt probnp inpatient   Result Value Ref Range    N-Terminal Pro BNP Inpatient 4717 (H) 0 - 1800 pg/mL   Hemoglobin   Result Value Ref Range    Hemoglobin 7.6 (L) 13.3 - 17.7 g/dL   CBC with platelets differential   Result Value Ref Range    WBC 6.9 4.0 - 11.0 10e9/L    RBC Count 3.86 (L) 4.4 - 5.9 10e12/L    Hemoglobin 7.5 (L) 13.3 - 17.7 g/dL    Hematocrit 26.3 (L) 40.0 - 53.0 %    MCV 68 (L) 78 - 100 fl    MCH 19.4 (L) 26.5 - 33.0 pg    MCHC 28.5 (L) 31.5 - 36.5 g/dL    RDW 17.6 (H) 10.0 - 15.0 %    Platelet Count 174 150 - 450 10e9/L    Diff Method Automated Method     % Neutrophils 59.6 %    % Lymphocytes 16.2 %    % Monocytes 11.3 %    % Eosinophils 12.1 %    % Basophils 0.4 %    % Immature Granulocytes 0.4 %    Nucleated RBCs 0 0 /100    Absolute Neutrophil 4.1 1.6 - 8.3 10e9/L    Absolute Lymphocytes 1.1 0.8 - 5.3 10e9/L    Absolute Monocytes 0.8 0.0 - 1.3 10e9/L    Absolute Eosinophils 0.8 (H) 0.0 - 0.7 10e9/L    Absolute Basophils 0.0 0.0 - 0.2 10e9/L    Abs Immature Granulocytes 0.0 0 - 0.4 10e9/L    Absolute Nucleated RBC 0.0    Basic metabolic panel   Result  Value Ref Range    Sodium 142 133 - 144 mmol/L    Potassium 4.3 3.4 - 5.3 mmol/L    Chloride 112 (H) 94 - 109 mmol/L    Carbon Dioxide 26 20 - 32 mmol/L    Anion Gap 4 3 - 14 mmol/L    Glucose 92 70 - 99 mg/dL    Urea Nitrogen 14 7 - 30 mg/dL    Creatinine 1.13 0.66 - 1.25 mg/dL    GFR Estimate 62 >60 mL/min/1.7m2    GFR Estimate If Black 75 >60 mL/min/1.7m2    Calcium 8.0 (L) 8.5 - 10.1 mg/dL   INR   Result Value Ref Range    INR 2.02 (H) 0.86 - 1.14   CBC with platelets differential   Result Value Ref Range    WBC 7.5 4.0 - 11.0 10e9/L    RBC Count 3.91 (L) 4.4 - 5.9 10e12/L    Hemoglobin 7.6 (L) 13.3 - 17.7 g/dL    Hematocrit 26.0 (L) 40.0 - 53.0 %    MCV 67 (L) 78 - 100 fl    MCH 19.4 (L) 26.5 - 33.0 pg    MCHC 29.2 (L) 31.5 - 36.5 g/dL    RDW 17.4 (H) 10.0 - 15.0 %    Platelet Count 210 150 - 450 10e9/L    Diff Method Automated Method     % Neutrophils 60.3 %    % Lymphocytes 11.3 %    % Monocytes 14.4 %    % Eosinophils 13.7 %    % Basophils 0.3 %    % Immature Granulocytes 0.0 %    Nucleated RBCs 0 0 /100    Absolute Neutrophil 4.5 1.6 - 8.3 10e9/L    Absolute Lymphocytes 0.9 0.8 - 5.3 10e9/L    Absolute Monocytes 1.1 0.0 - 1.3 10e9/L    Absolute Eosinophils 1.0 (H) 0.0 - 0.7 10e9/L    Absolute Basophils 0.0 0.0 - 0.2 10e9/L    Abs Immature Granulocytes 0.0 0 - 0.4 10e9/L    Absolute Nucleated RBC 0.0     RBC Fragments Slight     Ovalocytes Slight     Elliptocytes Slight     Platelet Estimate       Automated count confirmed.  Platelet morphology is normal.   Basic metabolic panel   Result Value Ref Range    Sodium 143 133 - 144 mmol/L    Potassium 3.9 3.4 - 5.3 mmol/L    Chloride 109 94 - 109 mmol/L    Carbon Dioxide 25 20 - 32 mmol/L    Anion Gap 9 3 - 14 mmol/L    Glucose 94 mg/dL    Urea Nitrogen 7 7 - 30 mg/dL    Creatinine 1.06 0.66 - 1.25 mg/dL    GFR Estimate 66 >60 mL/min/1.7m2    GFR Estimate If Black 80 >60 mL/min/1.7m2    Calcium 8.3 (L) 8.5 - 10.1 mg/dL     *Note: Due to a large number of results  and/or encounters for the requested time period, some results have not been displayed. A complete set of results can be found in Results Review.          Des Aguilar MD

## 2018-07-28 NOTE — PLAN OF CARE
Problem: Patient Care Overview  Goal: Plan of Care/Patient Progress Review  Discharge Planner OT   Patient plan for discharge: go home  Current status: Eval received and completed. Pt presents after having 1 week of dizziness, found to have low HgB, now has extreme R knee pain. At baseline Pt is independent, has a supportive wife, does have a walker at home he can use. Currently Pt can hardly bare any weight on R knee, was MOD A to get to the chair, unsafe with walker and transfer. Completed grooming sitting up in chair, he was able to reach down and don/doff socks. At this time he is unsafe to go home  Barriers to return to prior living situation: decreased mobility, balance and safety  Recommendations for discharge: rehab, if able to ambulate more home with assist and out patient therapy  Rationale for recommendations: Unsafe to return home at this time.        Entered by: Zain Holguin 07/28/2018 10:47 AM

## 2018-07-28 NOTE — PROGRESS NOTES
Xcoverage: paged by RN because pt c/o right big toe pain (new); Tylenol helped for a while, now has severe pain again; no h/o gout; will check Xray right big toe; Norco 1 tab po q6h prn ordered.    Tammy Membreno MD

## 2018-07-28 NOTE — PROGRESS NOTES
07/28/18 1034   Quick Adds   Type of Visit Initial Occupational Therapy Evaluation   Living Environment   Lives With spouse   Living Arrangements house   Home Accessibility bed and bath are not on the first floor   Number of Stairs to Enter Home 1   Number of Stairs Within Home 10   Stair Railings at Home inside, present on right side   Transportation Available car   Living Environment Comment Lives with spouse, one step to enter house. Pt    Self-Care   Dominant Hand right   Usual Activity Tolerance good   Current Activity Tolerance fair   Regular Exercise no   Equipment Currently Used at Home shower chair;raised toilet   Activity/Exercise/Self-Care Comment has a cane   Functional Level Prior   Ambulation 0-->independent   Transferring 0-->independent   Toileting 0-->independent   Bathing 0-->independent   Dressing 0-->independent   Eating 0-->independent   Communication 0-->understands/communicates without difficulty   Swallowing 0-->swallows foods/liquids without difficulty   Cognition 0 - no cognition issues reported   Fall history within last six months no   Prior Functional Level Comment Independent with ADLs prior to admission   General Information   Onset of Illness/Injury or Date of Surgery - Date 07/23/18   Referring Physician Aishwarya Beaver MD   Patient/Family Goals Statement Return home   Additional Occupational Profile Info/Pertinent History of Current Problem Came in with dizzyness and low BP, stubbed his toe, Pt now has severe pain in knee   Precautions/Limitations fall precautions   General Observations Recieved in bed, agreed to therapy.    Cognitive Status Examination   Orientation orientation to person, place and time   Visual Perception   Visual Perception Comments no deficits noted   Pain Assessment   Patient Currently in Pain Yes, see Vital Sign flowsheet   Range of Motion (ROM)   ROM Comment Full range in BUE   Strength   Strength Comments Full strength in BUE   Hand Strength   Hand  Strength Comments good  strength   Bed Mobility Skill: Rolling/Turning   Level of [place] - Bed Mobility Skill Rolling Turning contact guard   Physical Assist/Nonphysical Assist 1 person assist   Bed Mobility Skill: Supine to Sit   Level of [place]: Supine/Sit contact guard   Physical Assist/Nonphysical Assist: Supine/Sit 1 person assist   Transfer Skill: Bed to Chair/Chair to Bed   Level of [place]: Bed to Chair moderate assist (50% patients effort)   Physical Assist/Nonphysical Assist: Bed to Chair 1 person assist   Assistive Device - Transfer Skill Bed to Chair Chair to Bed Rehab Eval standard walker   Transfer Skill: Sit to Stand   Level of [place]: Sit/Stand moderate assist (50% patients effort)   Physical Assist/Nonphysical Assist: Sit/Stand 1 person assist   Assistive Device for Transfer: Sit/Stand standard walker   Transfer Skill: Toilet Transfer   Level of [place]: Toilet moderate assist (50% patients effort)   Balance   Balance Comments Able to sit eob with CGA, could reach down to his feet. Unsafe while standing, R knee buckling-min assist to maintain static standing   Bathing   Level of [place] minimum assist (75% patients effort)   Upper Body Dressing   Level of [place]: Dress Upper Body independent   Lower Body Dressing   Level of [place]: Dress Lower Body minimum assist (75% patients effort)   Grooming   Level of [place]: Grooming stand-by assist   Physical Assist/Nonphysical Assist: Grooming set-up required   Instrumental Activities of Daily Living (IADL)   Previous Responsibilities meal prep;housekeeping;laundry;shopping;yardwork;medication management;finances;driving   Activities of Daily Living Analysis   Impairments Contributing to Impaired Activities of Daily Living balance impaired;strength decreased   General Therapy Interventions   Planned Therapy Interventions ADL retraining;strengthening;transfer training   Clinical Impression   Criteria for  "Skilled Therapeutic Interventions Met yes, treatment indicated   OT Diagnosis Decits in ADLs   Influenced by the following impairments decreased strength, mobility, and pain   Assessment of Occupational Performance 3-5 Performance Deficits   Identified Performance Deficits toileting, bathing, dressing   Clinical Decision Making (Complexity) Moderate complexity   Therapy Frequency daily   Predicted Duration of Therapy Intervention (days/wks) 5   Anticipated Discharge Disposition Acute Rehabilitation Facility;Home with Assist  (pending progress)   Risks and Benefits of Treatment have been explained. Yes   Patient, Family & other staff in agreement with plan of care Yes   [facility] AM-PAC  \"6 Clicks\" Daily Activity Inpatient Short Form   1. Putting on and taking off regular lower body clothing? 3 - A Little   2. Bathing (including washing, rinsing, drying)? 2 - A Lot   3. Toileting, which includes using toilet, bedpan or urinal? 2 - A Lot   4. Putting on and taking off regular upper body clothing? 4 - None   5. Taking care of personal grooming such as brushing teeth? 3 - A Little   6. Eating meals? 4 - None   Daily Activity Raw Score (Score out of 24.Lower scores equate to lower levels of function) 18   Total Evaluation Time   Total Evaluation Time (Minutes) 10     "

## 2018-07-28 NOTE — PROVIDER NOTIFICATION
MD Notification    Notified Person: MD    Notified Person Name: Dr. Membreno    Notification Date/Time: 7/28/18 at 0500    Notification Interaction: Phone conversation.    Purpose of Notification: Pt's pain in right great toe is now getting worse. Pain now from knee down. Wondering if xray needs to be done, and if pt can get something stronger than Tylenol for pain management.    Orders Received: PRN Norco, and xray ordered.    Comments: Thanks!

## 2018-07-28 NOTE — PROGRESS NOTES
07/28/18 1034   Quick Adds   Type of Visit Initial Occupational Therapy Evaluation   Living Environment   Lives With spouse   Living Arrangements house   Home Accessibility bed and bath are not on the first floor   Number of Stairs to Enter Home 1   Number of Stairs Within Home 10   Stair Railings at Home inside, present on right side   Transportation Available car   Living Environment Comment Lives with spouse, one step to enter house. Pt    Self-Care   Dominant Hand right   Usual Activity Tolerance good   Current Activity Tolerance fair   Regular Exercise no   Equipment Currently Used at Home shower chair;raised toilet   Activity/Exercise/Self-Care Comment has a cane   Functional Level Prior   Ambulation 0-->independent   Transferring 0-->independent   Toileting 0-->independent   Bathing 0-->independent   Dressing 0-->independent   Eating 0-->independent   Communication 0-->understands/communicates without difficulty   Swallowing 0-->swallows foods/liquids without difficulty   Cognition 0 - no cognition issues reported   Fall history within last six months no   Prior Functional Level Comment Independent with ADLs prior to admission   General Information   Onset of Illness/Injury or Date of Surgery - Date 07/23/18   Referring Physician Aishwarya Beaver MD   Patient/Family Goals Statement Return home   Additional Occupational Profile Info/Pertinent History of Current Problem Came in with dizzyness and low BP, stubbed his toe, Pt now has severe pain in knee   Precautions/Limitations fall precautions   General Observations Recieved in bed, agreed to therapy.    Cognitive Status Examination   Orientation orientation to person, place and time   Visual Perception   Visual Perception Comments no deficits noted   Pain Assessment   Patient Currently in Pain Yes, see Vital Sign flowsheet   Range of Motion (ROM)   ROM Comment Full range in BUE   Strength   Strength Comments Full strength in BUE   Hand Strength   Hand  Strength Comments good  strength   Bed Mobility Skill: Rolling/Turning   Level of [place] - Bed Mobility Skill Rolling Turning contact guard   Physical Assist/Nonphysical Assist 1 person assist   Bed Mobility Skill: Supine to Sit   Level of [place]: Supine/Sit contact guard   Physical Assist/Nonphysical Assist: Supine/Sit 1 person assist   Transfer Skill: Bed to Chair/Chair to Bed   Level of [place]: Bed to Chair moderate assist (50% patients effort)   Physical Assist/Nonphysical Assist: Bed to Chair 1 person assist   Assistive Device - Transfer Skill Bed to Chair Chair to Bed Rehab Eval standard walker   Transfer Skill: Sit to Stand   Level of [place]: Sit/Stand moderate assist (50% patients effort)   Physical Assist/Nonphysical Assist: Sit/Stand 1 person assist   Assistive Device for Transfer: Sit/Stand standard walker   Transfer Skill: Toilet Transfer   Level of [place]: Toilet moderate assist (50% patients effort)   Balance   Balance Comments Able to sit eob with CGA, could reach down to his feet. Unsafe while standing, R knee buckling-min assist to maintain static standing   Bathing   Level of [place] minimum assist (75% patients effort)   Upper Body Dressing   Level of [place]: Dress Upper Body independent   Lower Body Dressing   Level of [place]: Dress Lower Body minimum assist (75% patients effort)   Grooming   Level of [place]: Grooming stand-by assist   Physical Assist/Nonphysical Assist: Grooming set-up required   Instrumental Activities of Daily Living (IADL)   Previous Responsibilities meal prep;housekeeping;laundry;shopping;yardwork;medication management;finances;driving   Activities of Daily Living Analysis   Impairments Contributing to Impaired Activities of Daily Living balance impaired;strength decreased   General Therapy Interventions   Planned Therapy Interventions ADL retraining;strengthening;transfer training   Clinical Impression   Criteria for  "Skilled Therapeutic Interventions Met yes, treatment indicated   OT Diagnosis Decits in ADLs   Influenced by the following impairments decreased strength, mobility, and pain   Assessment of Occupational Performance 3-5 Performance Deficits   Identified Performance Deficits toileting, bathing, dressing   Clinical Decision Making (Complexity) Moderate complexity   Therapy Frequency daily   Predicted Duration of Therapy Intervention (days/wks) 5   Anticipated Discharge Disposition Acute Rehabilitation Facility;Home with Assist  (pending progress)   Risks and Benefits of Treatment have been explained. Yes   Patient, Family & other staff in agreement with plan of care Yes   [facility] AM-PAC  \"6 Clicks\" Daily Activity Inpatient Short Form   1. Putting on and taking off regular lower body clothing? 3 - A Little   2. Bathing (including washing, rinsing, drying)? 2 - A Lot   3. Toileting, which includes using toilet, bedpan or urinal? 2 - A Lot   4. Putting on and taking off regular upper body clothing? 4 - None   5. Taking care of personal grooming such as brushing teeth? 3 - A Little   6. Eating meals? 4 - None   Daily Activity Raw Score (Score out of 24.Lower scores equate to lower levels of function) 18   Total Evaluation Time   Total Evaluation Time (Minutes) 10     "

## 2018-07-28 NOTE — PLAN OF CARE
Problem: Patient Care Overview  Goal: Plan of Care/Patient Progress Review  Outcome: No Change  Unable to bear much weight on RLE today due to knee and great toe swelling and pain.  Up to chair with assist/GB/walker.  Xray of foot and knee completed; Ortho consult pending.  Tylenol, 1 tab Norco and ice application with little relief.  Off oxygen with sats in low 90's dipping below 90 early today, improved with being up and using incentive spirometer frequently.  INR 1.40; on heparin subcutaneous.

## 2018-07-29 ENCOUNTER — APPOINTMENT (OUTPATIENT)
Dept: OCCUPATIONAL THERAPY | Facility: CLINIC | Age: 83
DRG: 812 | End: 2018-07-29
Payer: COMMERCIAL

## 2018-07-29 ENCOUNTER — APPOINTMENT (OUTPATIENT)
Dept: PHYSICAL THERAPY | Facility: CLINIC | Age: 83
DRG: 812 | End: 2018-07-29
Attending: INTERNAL MEDICINE
Payer: COMMERCIAL

## 2018-07-29 LAB
ANION GAP SERPL CALCULATED.3IONS-SCNC: 9 MMOL/L (ref 3–14)
BUN SERPL-MCNC: 18 MG/DL (ref 7–30)
CALCIUM SERPL-MCNC: 8.3 MG/DL (ref 8.5–10.1)
CHLORIDE SERPL-SCNC: 103 MMOL/L (ref 94–109)
CO2 SERPL-SCNC: 24 MMOL/L (ref 20–32)
CREAT SERPL-MCNC: 1.07 MG/DL (ref 0.66–1.25)
GFR SERPL CREATININE-BSD FRML MDRD: 66 ML/MIN/1.7M2
GLUCOSE SERPL-MCNC: 312 MG/DL (ref 70–99)
INR PPP: 1.55 (ref 0.86–1.14)
POTASSIUM SERPL-SCNC: 4 MMOL/L (ref 3.4–5.3)
SODIUM SERPL-SCNC: 136 MMOL/L (ref 133–144)
URATE SERPL-MCNC: 4.6 MG/DL (ref 3.5–7.2)

## 2018-07-29 PROCEDURE — 40000193 ZZH STATISTIC PT WARD VISIT

## 2018-07-29 PROCEDURE — 94640 AIRWAY INHALATION TREATMENT: CPT

## 2018-07-29 PROCEDURE — 84550 ASSAY OF BLOOD/URIC ACID: CPT | Performed by: HOSPITALIST

## 2018-07-29 PROCEDURE — 25000128 H RX IP 250 OP 636: Performed by: INTERNAL MEDICINE

## 2018-07-29 PROCEDURE — 25000132 ZZH RX MED GY IP 250 OP 250 PS 637

## 2018-07-29 PROCEDURE — 94640 AIRWAY INHALATION TREATMENT: CPT | Mod: 76

## 2018-07-29 PROCEDURE — 97530 THERAPEUTIC ACTIVITIES: CPT | Mod: GO

## 2018-07-29 PROCEDURE — 80048 BASIC METABOLIC PNL TOTAL CA: CPT | Performed by: HOSPITALIST

## 2018-07-29 PROCEDURE — 25000132 ZZH RX MED GY IP 250 OP 250 PS 637: Performed by: INTERNAL MEDICINE

## 2018-07-29 PROCEDURE — 97110 THERAPEUTIC EXERCISES: CPT | Mod: GP

## 2018-07-29 PROCEDURE — 36415 COLL VENOUS BLD VENIPUNCTURE: CPT | Performed by: HOSPITALIST

## 2018-07-29 PROCEDURE — 97161 PT EVAL LOW COMPLEX 20 MIN: CPT | Mod: GP

## 2018-07-29 PROCEDURE — 40000275 ZZH STATISTIC RCP TIME EA 10 MIN

## 2018-07-29 PROCEDURE — 97535 SELF CARE MNGMENT TRAINING: CPT | Mod: GO

## 2018-07-29 PROCEDURE — 85610 PROTHROMBIN TIME: CPT | Performed by: HOSPITALIST

## 2018-07-29 PROCEDURE — 25000125 ZZHC RX 250: Performed by: INTERNAL MEDICINE

## 2018-07-29 PROCEDURE — 25000132 ZZH RX MED GY IP 250 OP 250 PS 637: Performed by: HOSPITALIST

## 2018-07-29 PROCEDURE — 12000000 ZZH R&B MED SURG/OB

## 2018-07-29 PROCEDURE — 40000133 ZZH STATISTIC OT WARD VISIT

## 2018-07-29 PROCEDURE — 99233 SBSQ HOSP IP/OBS HIGH 50: CPT | Performed by: INTERNAL MEDICINE

## 2018-07-29 RX ORDER — IPRATROPIUM BROMIDE AND ALBUTEROL SULFATE 2.5; .5 MG/3ML; MG/3ML
3 SOLUTION RESPIRATORY (INHALATION)
Status: DISCONTINUED | OUTPATIENT
Start: 2018-07-29 | End: 2018-07-30

## 2018-07-29 RX ORDER — PREDNISONE 20 MG/1
20 TABLET ORAL DAILY
Status: DISCONTINUED | OUTPATIENT
Start: 2018-07-30 | End: 2018-07-30 | Stop reason: HOSPADM

## 2018-07-29 RX ORDER — METOPROLOL SUCCINATE 50 MG/1
50 TABLET, EXTENDED RELEASE ORAL DAILY
Status: DISCONTINUED | OUTPATIENT
Start: 2018-07-29 | End: 2018-07-30 | Stop reason: HOSPADM

## 2018-07-29 RX ORDER — SENNOSIDES 8.6 MG
1-2 TABLET ORAL 2 TIMES DAILY PRN
Status: DISCONTINUED | OUTPATIENT
Start: 2018-07-29 | End: 2018-07-30 | Stop reason: HOSPADM

## 2018-07-29 RX ORDER — WARFARIN SODIUM 5 MG/1
5 TABLET ORAL
Status: COMPLETED | OUTPATIENT
Start: 2018-07-29 | End: 2018-07-29

## 2018-07-29 RX ORDER — METHYLPREDNISOLONE SODIUM SUCCINATE 40 MG/ML
40 INJECTION, POWDER, LYOPHILIZED, FOR SOLUTION INTRAMUSCULAR; INTRAVENOUS ONCE
Status: COMPLETED | OUTPATIENT
Start: 2018-07-29 | End: 2018-07-29

## 2018-07-29 RX ADMIN — HYDROCODONE BITARTRATE AND ACETAMINOPHEN 1 TABLET: 5; 325 TABLET ORAL at 08:43

## 2018-07-29 RX ADMIN — METOPROLOL SUCCINATE 50 MG: 50 TABLET, EXTENDED RELEASE ORAL at 10:25

## 2018-07-29 RX ADMIN — TRIAMCINOLONE ACETONIDE: 1 CREAM TOPICAL at 06:44

## 2018-07-29 RX ADMIN — WARFARIN SODIUM 5 MG: 5 TABLET ORAL at 19:00

## 2018-07-29 RX ADMIN — HEPARIN SODIUM 5000 UNITS: 5000 INJECTION, SOLUTION INTRAVENOUS; SUBCUTANEOUS at 05:24

## 2018-07-29 RX ADMIN — METHYLPREDNISOLONE SODIUM SUCCINATE 40 MG: 40 INJECTION, POWDER, FOR SOLUTION INTRAMUSCULAR; INTRAVENOUS at 08:43

## 2018-07-29 RX ADMIN — SENNOSIDES 2 TABLET: 8.6 TABLET, FILM COATED ORAL at 21:07

## 2018-07-29 RX ADMIN — HYDROXYZINE HYDROCHLORIDE 25 MG: 25 TABLET, FILM COATED ORAL at 06:39

## 2018-07-29 RX ADMIN — HEPARIN SODIUM 5000 UNITS: 5000 INJECTION, SOLUTION INTRAVENOUS; SUBCUTANEOUS at 15:57

## 2018-07-29 RX ADMIN — IPRATROPIUM BROMIDE AND ALBUTEROL SULFATE 3 ML: .5; 3 SOLUTION RESPIRATORY (INHALATION) at 07:20

## 2018-07-29 RX ADMIN — PANTOPRAZOLE SODIUM 40 MG: 40 TABLET, DELAYED RELEASE ORAL at 06:39

## 2018-07-29 RX ADMIN — IPRATROPIUM BROMIDE AND ALBUTEROL SULFATE 3 ML: .5; 3 SOLUTION RESPIRATORY (INHALATION) at 15:12

## 2018-07-29 RX ADMIN — IPRATROPIUM BROMIDE AND ALBUTEROL SULFATE 3 ML: .5; 3 SOLUTION RESPIRATORY (INHALATION) at 19:35

## 2018-07-29 RX ADMIN — SENNOSIDES 1 TABLET: 8.6 TABLET, FILM COATED ORAL at 14:27

## 2018-07-29 RX ADMIN — ATORVASTATIN CALCIUM 10 MG: 10 TABLET, FILM COATED ORAL at 21:06

## 2018-07-29 ASSESSMENT — ACTIVITIES OF DAILY LIVING (ADL)
ADLS_ACUITY_SCORE: 14
ADLS_ACUITY_SCORE: 13
ADLS_ACUITY_SCORE: 12
ADLS_ACUITY_SCORE: 12
ADLS_ACUITY_SCORE: 14
ADLS_ACUITY_SCORE: 14

## 2018-07-29 NOTE — PLAN OF CARE
Problem: Patient Care Overview  Goal: Plan of Care/Patient Progress Review  Discharge Planner OT   Patient plan for discharge: go home  Current status: Pt much improved today with ambulation and ADLs, Pt completed toileting adl with CGA, completed grooming at sink with supervision. Ambulated 300 feet with SBA using walker. Pt does not want to go to rehab, with this improvement, discharge home is a possible. Son home to assist with one step  Barriers to return to prior living situation: weakness, decreased mobility   Recommendations for discharge: home with assist, oopt  Rationale for recommendations: Pt does not want to go to rehab. Much improved today, has help at home        Entered by: Zain Holguin 07/29/2018 2:33 PM

## 2018-07-29 NOTE — PLAN OF CARE
Problem: Patient Care Overview  Goal: Plan of Care/Patient Progress Review  Outcome: No Change  Patient is oriented. VSS on 2L nc overnight. Tried to leave on RA but desats consistently into 80s. Up with A1. No c/o pain. Icing and elevating right knee which is swollen. Last Hb 7.1. Last INR 1.40. Discharge pending, OT deemed pt unsafe to go home at this time. TCU placement? RN will continue to monitor.

## 2018-07-29 NOTE — PROGRESS NOTES
SPIRITUAL HEALTH SERVICES Progress Note  [facility] 66    , LOS visit. The patient reported having good support from children and grand children. The patient talked about different experiences of life - [employer], career in construction, marriage, heritage of grand and great grand children. The patient talked about he and his spouse being of different traditions, spouse was Mandaen and he is Jew.  The patient process some scary and troubling experiences when in serving  service in the [facility].      provided non- mental refelctive listening, care in presence, and offered a singing blessing.     Coping with family support.      has no further plans.         Charles Castanon  Chaplain Resident

## 2018-07-29 NOTE — PROGRESS NOTES
07/29/18 0945   Quick Adds   Type of Visit Initial PT Evaluation   Living Environment   Lives With spouse   Living Arrangements house   Home Accessibility stairs to enter home   Number of Stairs to Enter Home 1   Number of Stairs Within Home 10  (to basement, but all living needs on main level)   Stair Railings at Home (none outside)   Self-Care   Usual Activity Tolerance moderate   Current Activity Tolerance fair   Equipment Currently Used at Home cane, straight   Functional Level Prior   Ambulation 1-->assistive equipment   Transferring 1-->assistive equipment   Fall history within last six months no   Which of the above functional risks had a recent onset or change? ambulation;transferring   General Information   Onset of Illness/Injury or Date of Surgery - Date 07/23/18   Referring Physician Aishwarya Beaver MD   Patient/Family Goals Statement return home   Pertinent History of Current Problem (include personal factors and/or comorbidities that impact the POC) Pt admitted due to dizziness and found low blood pressure and acute worsening anemia. PMH includes: aortic stenosis, atrial fibrillation, DVT, GERD, heart murmur, monoclonal gammopathy, neuropathy, hyperlipidemia, malignant lymphomas, right bundle branch block, severe sepsis with acute organ dysfunction   Precautions/Limitations fall precautions   Weight-Bearing Status - LLE full weight-bearing   Weight-Bearing Status - RLE weight-bearing as tolerated   Cognitive Status Examination   Orientation orientation to person, place and time   Level of Consciousness alert   Follows Commands and Answers Questions 100% of the time;able to follow single-step instructions   Personal Safety and Judgment intact   Pain Assessment   Patient Currently in Pain Yes, see Vital Sign flowsheet  (knee pain)   Posture    Posture Kyphosis;Forward head position   Range of Motion (ROM)   ROM Quick Adds No deficits were identified   Strength   Strength Comments hip flexion 3+/5  "bilaterally, knee extension L 4/5, cannot fully assess R knee extension due to pain, ankle dorsiflexion 4+/5   Bed Mobility   Bed Mobility Comments did not assess   Transfer Skills   Transfer Comments sit <> stand with CGA and FWW upon standing   Gait   Gait Comments Pt ambulated 10' in room with CGA and FWW. Pt demonstrated antalgic gait, step-to gait pattern, and heavy UE support on walker. Pt is WBAT on R LE.    Balance   Balance Comments no unsteadiness noted   General Therapy Interventions   Planned Therapy Interventions bed mobility training;gait training;strengthening;transfer training   Clinical Impression   Criteria for Skilled Therapeutic Intervention yes, treatment indicated   PT Diagnosis difficulty ambulating   Influenced by the following impairments decreased strength, pain   Functional limitations due to impairments ambulation, transfers   Clinical Presentation Stable/Uncomplicated   Clinical Presentation Rationale medically stable   Clinical Decision Making (Complexity) Low complexity   Therapy Frequency` daily   Predicted Duration of Therapy Intervention (days/wks) 3 days   Anticipated Discharge Disposition Transitional Care Facility   Risk & Benefits of therapy have been explained Yes   Patient, Family & other staff in agreement with plan of care Yes   [facility] [employer] TM \"6 Clicks\"   2016, Trustees of [facility], under license to [employer].  All rights reserved.   6 Clicks Short Forms Basic Mobility Inpatient Short Form   [facility] AM-PAC  \"6 Clicks\" V.2 Basic Mobility Inpatient Short Form   1. Turning from your back to your side while in a flat bed without using bedrails? 3 - A Little   2. Moving from lying on your back to sitting on the side of a flat bed without using bedrails? 3 - A Little   3. Moving to and from a bed to a chair (including a wheelchair)? 3 - A Little   4. Standing up from a chair using your arms (e.g., wheelchair, or bedside chair)? 3 - A Little   5. " To walk in hospital room? 3 - A Little   6. Climbing 3-5 steps with a railing? 2 - A Lot   Basic Mobility Raw Score (Score out of 24.Lower scores equate to lower levels of function) 17   Total Evaluation Time   Total Evaluation Time (Minutes) 10

## 2018-07-29 NOTE — PROVIDER NOTIFICATION
HR has been in the 110-120's this morning.  BP currently 104/56.  Text page to Dr. Alvarado questioning whether he should restart his PTA Toprol XL.    Addendum:  MD restarted medication

## 2018-07-29 NOTE — PROGRESS NOTES
[facility]    Hospitalist Progress Note    Assessment & Plan   Hermilo Velasquez is a 85 year old male who presents with dizziness for 1 week, found to have low blood pressure and acute worsening of his anemia      Relative hypotension, bradycardia  Underlying rhythm is sinus bradycardia  Improved with fluids\no obvious infection present  - hold blood presssure medications  -blood pressures continue to be soft still         Acute on chronic anemia from iron deficiency  Drop from 10.7   Has become microcytic, iron studies consistent with fe deficiency  No obvious source of bleeding, on warfarin  Presumably GI, last colon was 5 years ago and had polyps  Also has severe heartburn, they have been discontinued on the PPI because of allergy by the allergist and so has been taking zantac  - appreciate GI input  -status post colonoscopy and esophagogastroduodenoscopy, multiple spots of angiectasia noted, possibly causing bleeding, no active bleeding noted   Monitor hemoglobin  and transfuse as needed. Hb has been stable in 7 range since asmission  -received dose of IV Venofer 300mg X 1 on 7/27  -close f/u PCP with follow CBC, seek medical attention if bloody stools  - restarted coumadin without bridging  -per GI: If active bleeding is seen in future would suggest tagged RBC scan  - see pcp in clinic one week with CBC   - will need f/u INR. Discuss with pharmD in am regarding dc dose and timing of next INR.   Unclear if tcu vs home with home PT. OT at this time- making progress toward dc home  Follow up with GI      Acute kidney injury  Likely prerenal,resolved      History of DVT/PE  - due to recent gastrointestinal blood loss will restart warfarin with out bridging  -INR 1.5 today   -am inr  -continue heparin deep vein thrombosis px dose for now       H/o Aortive valve replacement, bioprosthetic  - noted      GERD  - zantac      Cough  History of COPD  CXR clear  Diastolic heart failure   - echocardiogram  noted: 7/24  The left ventricle is normal in size.  The visual ejection fraction is estimated at 50-55%.  Diastolic Doppler findings (E/E' ratio and/or other parameters) suggest left  ventricular filling pressures are increased.  No regional wall motion abnormalities noted.  The right ventricle is moderately dilated.  Mildly decreased right ventricular systolic function  There is a bioprosthetic aortic valve.  The prosthetic aortic valve appears to open well.  The gradient is normal for this prosthetic aortic valve.    - stopped hydration ,monitor closely due to recent fluid shift with bowel prep etc   -CXR clear 7/23    mildy hypoxic.  Long heavy smoking hx.   Not on Rx.  Was prescribed spiriva    Plan:   - IS  - duonebs qid in hospital and wean off oxygen.  This afternoon satting 93% RA.   - discharge on Spiriva and prn albuterol vs qid combivent  - continue home meds      Acute R knee pain  R great toe pain  Imaging XR of R knee and R foot without fracture  + R knee effusion noted  Wbc up but nl. Afebrile. Not appear toxic/ill.  Discussed with ortho  Suspect gout/pseudogout. Possible component of hemarthrosis  inr 1.4.   Ortho not feel arthrocentesis indicated at this time  Proceed with steroid Rx for gout/pseudogout  -solumedrol 60mg iv X 1 on 7/28 with notable improvement in swelling and pain.  -solumedrol 40mg IV X 1 7/29  Prednisone 20mg every day started for 7/30- taper  Likely tapering 7-10 course of prednisone recommended  TCU vs home - to be determine tomorrow  wbat  PT, OT  Ice tid to qid  Elevate RLE  Disposition per PT, OT. Unable to dc home today given acute knee pain and toe pain  Can consider R knee brace (neoprene) if needed at time of discharge (to help reduce pain)    Pruritus  - resumed home medications     DVT Prophylaxis: will start back on warfarin , international normalized ratio  1.55 today     Code Status: Full Code      Dispo: unable to dc home at this time per OT. Has walker at home.    Improved with PT this afternoon.   Will need to see PT in am (discussed with PT today) to reeval home vs TCU. Improved today given less pain in R knee and R great toe.       Chris Alvarado MD  Text Page  (7am to 6pm)  Interval History    Seen this am  No new issues  Pain in R great toe much improved. Pain in R knee also improved  Did much better with PT this afternoon  No change in breathing with steroids  No sob.   Smoked 3 ppd for decades. On spiriva awhile back but stopped after 1 week as not seem to help.     -Data reviewed today: I reviewed all new labs and imaging results over the last 24 hours. I personally reviewed labs since admission and imaging    Physical Exam   Temp: 97.1  F (36.2  C) Temp src: Oral BP: 99/56   Heart Rate: 97 Resp: 16 SpO2: 93 % O2 Device: None (Room air) Oxygen Delivery: 2 LPM  Vitals:    07/23/18 1709 07/23/18 1711 07/23/18 1946   Weight: 78.5 kg (173 lb) 79.4 kg (175 lb) 61.3 kg (135 lb 2.3 oz)     Vital Signs with Ranges  Temp:  [97.1  F (36.2  C)-98.8  F (37.1  C)] 97.1  F (36.2  C)  Heart Rate:  [] 97  Resp:  [16-20] 16  BP: ()/(56-60) 99/56  SpO2:  [88 %-96 %] 93 %  I/O last 3 completed shifts:  In: 690 [P.O.:690]  Out: 1230 [Urine:1230]    Constitutional: Nad, nontoxic, sitting up in chair, appears comfortable  Respiratory: Distant BS bilaterally, trace wheeze, no crackles.   Cardiovascular: RRR no r/g/m  GI: soft, nt, nd. Nl BS  Skin/Integumen: no rash or edema  Neuro: alert, oriented, nl speech  MSK: R knee with effusion-smaller today, pain with passive rom but less today. Less pain with rom of R knee.  R great toe with less redness and swelling- NT today      Medications     Warfarin Therapy Reminder         atorvastatin  10 mg Oral At Bedtime     heparin  5,000 Units Subcutaneous Q12H     ipratropium - albuterol 0.5 mg/2.5 mg/3 mL  3 mL Nebulization 4x daily     metoprolol succinate  50 mg Oral Daily     pantoprazole  40 mg Oral QAM AC     [START ON  7/30/2018] predniSONE  20 mg Oral Daily     warfarin  5 mg Oral ONCE at 18:00       Data     Recent Labs  Lab 07/29/18  1050 07/28/18  0851 07/27/18  1119 07/27/18  0910 07/26/18  0808  07/23/18  1715   WBC  --  11.0  --  10.3 7.5  < > 7.7   HGB  --  7.1*  --  7.4* 7.6*  < > 7.8*   MCV  --  67*  --  68* 67*  < > 67*   PLT  --  193  --  205 210  < > 202   INR 1.55* 1.40* 1.56*  --  1.73*  < > 2.43*    138  --  139 143  < > 140   POTASSIUM 4.0 4.4  --  4.2 3.9  < > 4.4   CHLORIDE 103 106  --  108 109  < > 107   CO2 24 26  --  24 25  < > 26   BUN 18 9  --  7 7  < > 24   CR 1.07 1.02  --  1.12 1.06  < > 1.42*   ANIONGAP 9 6  --  7 9  < > 7   AMRITA 8.3* 7.9*  --  7.9* 8.3*  < > 8.2*   * 117*  --  131* 94  < > 109*   TROPI  --   --   --   --   --   --  <0.015   < > = values in this interval not displayed.    Imaging:   No results found for this or any previous visit (from the past 24 hour(s)).

## 2018-07-29 NOTE — PLAN OF CARE
Problem: Patient Care Overview  Goal: Plan of Care/Patient Progress Review  PT:  Discharge Planner PT   Patient plan for discharge: home  Current status: Orders received, eval completed, treatment initiated. Pt admitted due to dizziness and found to have low blood pressure and acute worsening anemia. Prior to admission pt was living at home with his wife and independent with all mobility. Pt currently transfers with CGA and FWW and ambulated 10' with CGA and FWW. Gait is antalgic and requires heavy UE use on FWW for WBAT on R LE. Pt is limited in ambulation and exercises due to pain. Pt completed seated exercises with tactile and verbal cuing.   Barriers to return to prior living situation: falls risk  Recommendations for discharge: TCU  Rationale for recommendations: Pt will continue to benefit from skilled therapy services in order to increase his strength, activity tolerance, and improve his independence and safety with ambulation and transfers prior to returning home.        Entered by: Sabrina Jacobo 07/29/2018 10:04 AM

## 2018-07-29 NOTE — PLAN OF CARE
Problem: Patient Care Overview  Goal: Plan of Care/Patient Progress Review  Outcome: Improving  Pain and swelling decreasing to right knee and foot with IV steroid, ice application and Norco x 1 for pain.  Unable to walk more than 10 feet this am with PT, this afternoon was able to ambulate in the [place] and OT recommended home with help.  HR 90's after resuming Metorprolol XL with prior -120's.  No BM since colonoscopy prep on the 26th, senna given; states is passing flatus.  Good intake.  Voiding per urinal.  INR 1.55.   Off oxygen, room air sat 95%; using incentive spirometer frequently; lungs sound clear today; also has scheduled nebs.   Discharge is anticipated tomorrow if continues to improve.

## 2018-07-29 NOTE — PLAN OF CARE
Problem: Patient Care Overview  Goal: Plan of Care/Patient Progress Review  Outcome: No Change  A+Ox4, up with 1 assist GB + walker. Tolerating regular diet. Uses IS. LS course crackles. Denies SOB. Hgb 7.1 Norco x1 and ice packs for right knee pain with relief. BS+. Tele: SR with BBB and occasional PVC's. VSS, on RA. Nursing will continue to monitor. Discharge pending TCU placement? D/t therapy needs r/t right knee.

## 2018-07-30 ENCOUNTER — APPOINTMENT (OUTPATIENT)
Dept: PHYSICAL THERAPY | Facility: CLINIC | Age: 83
DRG: 812 | End: 2018-07-30
Payer: COMMERCIAL

## 2018-07-30 ENCOUNTER — DOCUMENTATION ONLY (OUTPATIENT)
Dept: LAB | Facility: CLINIC | Age: 83
End: 2018-07-30

## 2018-07-30 VITALS
HEART RATE: 108 BPM | DIASTOLIC BLOOD PRESSURE: 47 MMHG | RESPIRATION RATE: 20 BRPM | BODY MASS INDEX: 19.35 KG/M2 | OXYGEN SATURATION: 94 % | SYSTOLIC BLOOD PRESSURE: 110 MMHG | WEIGHT: 135.14 LBS | TEMPERATURE: 98.8 F | HEIGHT: 70 IN

## 2018-07-30 DIAGNOSIS — I26.99 PULMONARY EMBOLISM, BILATERAL (H): Primary | ICD-10-CM

## 2018-07-30 PROCEDURE — 97116 GAIT TRAINING THERAPY: CPT | Mod: GP

## 2018-07-30 PROCEDURE — 25000132 ZZH RX MED GY IP 250 OP 250 PS 637: Performed by: HOSPITALIST

## 2018-07-30 PROCEDURE — 40000275 ZZH STATISTIC RCP TIME EA 10 MIN

## 2018-07-30 PROCEDURE — 25000125 ZZHC RX 250: Performed by: INTERNAL MEDICINE

## 2018-07-30 PROCEDURE — 25000132 ZZH RX MED GY IP 250 OP 250 PS 637: Performed by: INTERNAL MEDICINE

## 2018-07-30 PROCEDURE — 99239 HOSP IP/OBS DSCHRG MGMT >30: CPT | Performed by: INTERNAL MEDICINE

## 2018-07-30 PROCEDURE — 40000193 ZZH STATISTIC PT WARD VISIT

## 2018-07-30 PROCEDURE — 25000128 H RX IP 250 OP 636: Performed by: INTERNAL MEDICINE

## 2018-07-30 PROCEDURE — 94640 AIRWAY INHALATION TREATMENT: CPT

## 2018-07-30 RX ORDER — ACETAMINOPHEN 325 MG/1
650 TABLET ORAL EVERY 4 HOURS PRN
Qty: 100 TABLET | Status: ON HOLD | COMMUNITY
Start: 2018-07-30 | End: 2019-03-29

## 2018-07-30 RX ORDER — METOPROLOL SUCCINATE 100 MG/1
50 TABLET, EXTENDED RELEASE ORAL DAILY
Qty: 90 TABLET | Refills: 3 | Status: SHIPPED | OUTPATIENT
Start: 2018-07-30 | End: 2019-11-06

## 2018-07-30 RX ORDER — FERROUS SULFATE 325(65) MG
325 TABLET ORAL 2 TIMES DAILY
Qty: 60 TABLET | Refills: 0 | Status: SHIPPED | OUTPATIENT
Start: 2018-07-30 | End: 2018-08-03

## 2018-07-30 RX ORDER — PANTOPRAZOLE SODIUM 40 MG/1
40 TABLET, DELAYED RELEASE ORAL
Qty: 30 TABLET | Refills: 0 | Status: SHIPPED | OUTPATIENT
Start: 2018-07-31 | End: 2018-08-03

## 2018-07-30 RX ORDER — IPRATROPIUM BROMIDE AND ALBUTEROL SULFATE 2.5; .5 MG/3ML; MG/3ML
3 SOLUTION RESPIRATORY (INHALATION) EVERY 4 HOURS PRN
Status: DISCONTINUED | OUTPATIENT
Start: 2018-07-30 | End: 2018-07-30

## 2018-07-30 RX ORDER — PREDNISONE 10 MG/1
TABLET ORAL
Qty: 15 TABLET | Refills: 0 | Status: SHIPPED | OUTPATIENT
Start: 2018-07-30 | End: 2018-08-13

## 2018-07-30 RX ORDER — BISACODYL 10 MG
10 SUPPOSITORY, RECTAL RECTAL DAILY PRN
Status: DISCONTINUED | OUTPATIENT
Start: 2018-07-30 | End: 2018-07-30 | Stop reason: HOSPADM

## 2018-07-30 RX ADMIN — PREDNISONE 20 MG: 20 TABLET ORAL at 09:07

## 2018-07-30 RX ADMIN — HYDROXYZINE HYDROCHLORIDE 25 MG: 25 TABLET, FILM COATED ORAL at 09:07

## 2018-07-30 RX ADMIN — TRIAMCINOLONE ACETONIDE: 1 CREAM TOPICAL at 09:04

## 2018-07-30 RX ADMIN — PANTOPRAZOLE SODIUM 40 MG: 40 TABLET, DELAYED RELEASE ORAL at 06:31

## 2018-07-30 RX ADMIN — METOPROLOL SUCCINATE 50 MG: 50 TABLET, EXTENDED RELEASE ORAL at 09:14

## 2018-07-30 RX ADMIN — SENNOSIDES 2 TABLET: 8.6 TABLET, FILM COATED ORAL at 09:29

## 2018-07-30 RX ADMIN — IPRATROPIUM BROMIDE AND ALBUTEROL SULFATE 3 ML: .5; 3 SOLUTION RESPIRATORY (INHALATION) at 07:17

## 2018-07-30 RX ADMIN — HEPARIN SODIUM 5000 UNITS: 5000 INJECTION, SOLUTION INTRAVENOUS; SUBCUTANEOUS at 06:30

## 2018-07-30 ASSESSMENT — ACTIVITIES OF DAILY LIVING (ADL)
ADLS_ACUITY_SCORE: 14

## 2018-07-30 NOTE — PLAN OF CARE
Problem: Patient Care Overview  Goal: Plan of Care/Patient Progress Review  PT:  Discharge Planner PT   Patient plan for discharge: Home  Current status: Sit<>stand transfers completed with SBA and use of FWW. Patient ambulated 250 feet with use of FWW and CGA to SBA. Patient with decreased antalgic gait pattern this date and demonstrating near normal ashok; overall steady with use of FWW. Patient states he has a single threshold step to navigate to get into the house and then everything he needs is on the main level; he also utilizes a FWW for all mobility. Patient able to complete 10 standing marches with FWW and SBA; good standing balance noted with AD. Patient sitting in chair at end of session with all needs within reach and chair alarm on.   Barriers to return to prior living situation: weakness  Recommendations for discharge: Home with assistance from spouse  Rationale for recommendations: Patient demonstrating increased ambulation distance this session with good use of FWW and SBA to CGA. Anticipate patient will be safe to discharge home with assistance from his spouse for household tasks. Discussed home set up with patient; patient states he has a single threshold step to navigate to get into the house and then everything he needs is on the main level; he also utilizes a FWW for all mobility. Patient states his son will also be present when he goes home and both his son and his spouse can stand on each side of him and assist in completing single threshold step into the house.        Entered by: Lexi Navarro 07/30/2018 10:54 AM        Physical Therapy Discharge Summary    Reason for therapy discharge:    Discharged to home.    Progress towards therapy goal(s). See goals on Care Plan in [facility] electronic health record for goal details.  Goals not met.  Barriers to achieving goals:   discharge from facility.    Therapy recommendation(s):    No further therapy is recommended.

## 2018-07-30 NOTE — PLAN OF CARE
Spoke with patient about doing some outpatient PT after discharge. Pt states that he is not at all interested in any PT and that he does not need it. RN ambulated with pt this morning and he did very well with walker. Good ROM in LLE, knee still swollen but no pain. Checked O2 at rest on RA (92%) and when ambulating (88%). Pt has COPD. Will continue to monitor.

## 2018-07-30 NOTE — PROGRESS NOTES
Pt is on room air with Sp02 94%. Lung sounds diminished throughout. Neb tx given as schedule.   7/29/2018  Doris Quispe

## 2018-07-30 NOTE — PLAN OF CARE
"Problem: Patient Care Overview  Goal: Plan of Care/Patient Progress Review  OT: pt refused all OT attempts to engage in OT session, pt stated \"I can do all that\" when asked to participate with ADL task. Pt stated he will have lots of help at home for ADLS as needed.       "

## 2018-07-30 NOTE — PLAN OF CARE
Problem: Patient Care Overview  Goal: Plan of Care/Patient Progress Review  Outcome: Adequate for Discharge Date Met: 07/30/18  Discharge    Patient discharged to home via own transporation with wife  Care plan note: vss, alert x4, denied pain. +BS, lungs clear, on RA. IND in room. No BM for 3 days, soap enema given with relief. Pt is to follow up with PCP for INR and Hgb checks. Appointments made. Wife at bedside, aware of discharge plan and instruction.     Listed belongings gathered and returned to patient. Yes  Care Plan and Patient education resolved: Yes  Prescriptions if needed, hard copies sent with patient  NA  Home and hospital acquired medications returned to patient: Yes  Medication Bin checked and emptied on discharge Yes  Follow up appointment made for patient: Yes

## 2018-07-30 NOTE — PLAN OF CARE
Problem: Patient Care Overview  Goal: Plan of Care/Patient Progress Review  Outcome: Improving  Pt slept well. VSS on RA. No c/o pain. Possible discharge today after PT re-eval. Home vs. TCU. IV is SL. INR recheck in am. Last Hb 7.1. No signs of bleeding. Nursing will continue to monitor.

## 2018-07-30 NOTE — DISCHARGE SUMMARY
[facility]    Discharge Summary  Hospitalist    Date of Admission:  7/23/2018  Date of Discharge:  7/30/2018  Discharging Provider: Echo Pineda    Discharge Diagnoses   Relative hypotension and bradycardia: Improved  Acute on Chronic Anemia  Hx of DVT/PE  Hx of aortic stenosis, s/p bioprosthetic AV  Cough  Hx of COPD  Hx of Diastolic HF  GERD  Acute R Knee Pain and R Great Toe Pain dt suspected gout/pseudogout    History of Present Illness   Hermilo Velasquez is a 85 year old male with PMHx of bioprosthetic AV replacement in 2015, hx of DVT/PE on chronic anticoagulation with warfarin, COPD, GERD, monoclonal gammopathy, hx of Non Hodgkins lymphoma and peripheral neuropathy who was admitted on 7/23/2018 with dizziness and findings of hypotension and worsening anemia.    Hospital Course   Hermilo Velasquez was admitted on 7/23/2018.  The following problems were addressed during his hospitalization:    Relative hypotension and bradycardia: Improved  Sinus bradycardia on EKG. HR/BPs improved with fluid boluses this stay. No s/sx of infection. Echo this stay showed intact EF. Metoprolol XL initially held, resumed at decreased dose of 50mg daily (from 100mg daily)     Acute on Chronic Anemia:  Hgb 7.8 on admission. Baseline hgb seems to be around 10-11. Has noted microcytosis, workup consistent with iron deficiency anemia. On chronic anticoagulation with warfarin. No s/sx of GI bleeding noted on admission. GI consulted, underwent evaluation per GI this stay ([facility]). Underwent further evaluation with EGD and colonoscopy this stay (on 7/26). Had multiple spots of angiectasia noted but otherwise no active source of bleeding. Given IV Venofer this stay. Hgb has remained stable around 7 this stay. Changed from H2 blocker to PPI. See some mention of possible allergy to PPI (pruritis?) but not listed in the chart and patient didn't relay this information. Tolerated PPI well this stay. Per GI,  recommended tagged RBC scan if bleeding were to recur. Recommend initiation of oral iron supplement. Should follow up with PCP, if concerns for allergy could change back to H2 blocker.    Hx of DVT/PE:  Chronically anticoagulated with warfarin. INR 2.43 on admission. Allowed to drift down during stay while pursuing GI workup. Warfarin resumed w/o bridging on 7/27 and managed per pharmacy this stay. INR 1.55 on 7/29. Will cont taking warfarin 5mg daily. Next INR check ordered for 7/31 through PCP's clinic.      Hx of Bioprosthetic AV:  Chronic and stable. Echocardiogram this stay showed intact EF 50-55% and no significant valve dysfunction.      Cough  Hx of COPD  Hx of Diastolic HF:  Has longstanding hx of tobacco use. Not on tx for COPD but was reportedly prescribed Spiriva in the past. ProBNP 4700 this stay. 7/23 CXR unrevealing. Briefly required O2 during his stay but was subsequently weaned back to room air. Discharged with plans to continue Spiriva as per prior to admission.      GERD:   Chronic and stable on PPI this stay.     Acute R Knee Pain and R Great Toe Pain dt suspected gout/pseudogout:  Endorsed pain during hospitalization. Xrays of the knee and foot were neg for fracture. Had noted R knee effusion. Afebrile and not acutely ill appearing but had mild leukocytosis. Evaluated per ortho -- did not feel arthrocentesis was needed, suspected gout vs pseudogout, possible hemarthrosis in the setting of anticoagulation. Initiated on treatment for gout/pseudogout with IV steroids. Given solumedrol 60mg IV x1 on 7/28 with noted improvement, tapered to 40mg IV x1 on 7/29. Placed on prednisone 20mg daily with taper on 7/30 (20mg daily x5d, then 10mg daily x5d then stop). Pain and mobility improved with steroids. Initially concerned TCU stay may be needed. Declined PT/OT assessments the day of discharge and elected to dc home, has walker and assistance from his wife. Was not requiring regular use of pain meds on  the day of discharge -- recommend Tylenol prn. Will follow up with PCP.      FRED: Resolved.  Cr 1.4 on admission. Normalized after IVFs this stay.     Pruritis:  Chronic and stable on home meds.     Discharge Pain Plan:  - During his hospitalization, Hermilo experienced pain due to suspected gout vs pseudogout.  The pain plan for discharge was discussed with Hermilo and the plan was created in a collaborative fashion.    - Pharmacologic adjuvants:  Acetaminophen and prednisone    Echo Pineda    Significant Results and Procedures   7/24/18 Echocardiogram:  Interpretation Summary     The left ventricle is normal in size.  The visual ejection fraction is estimated at 50-55%.  Diastolic Doppler findings (E/E' ratio and/or other parameters) suggest left ventricular filling pressures are increased.  No regional wall motion abnormalities noted.  The right ventricle is moderately dilated.  Mildly decreased right ventricular systolic function  There is a bioprosthetic aortic valve.  The prosthetic aortic valve appears to open well.  The gradient is normal for this prosthetic aortic valve.    7/26/18 EGD/Colonoscopy:  EGD:  Findings:        The esophagus was normal.        A few 1 to 2 mm no bleeding angioectasias were found in the stomach.        Two 2 to 3 mm angioectasias without bleeding were found in the second portion of the duodenum.                                                                                   Impression:                 - Angioectasias, perhaps contributing to anemia.       Colonoscopy:  Findings:        Three small angioectasias without bleeding were found in the cecum.        Five sessile polyps were found in the transverse colon. The polyps were 4 to 5 mm in size. These polyps were removed with a cold snare.        Resection and retrieval were complete.        Two sessile polyps were found in the sigmoid colon. The polyps were 5 to 8 mm in size.        The exam was otherwise without  abnormality.                                                                                     Impression:                 - Three non-bleeding colonic angioectasias.   - Five 4 to 5 mm polyps in the transverse colon, removed with a cold snare. Resected and retrieved.   - Two 5 to 8 mm polyps in the sigmoid colon.   - The examination was otherwise normal.     Recommendation:             - Await biopsy results.   - Further colonoscopies are not recommended for this patient.    Code Status   Full Code       Primary Care Physician   Karson Bishop    Physical Exam   Temp: 98.8  F (37.1  C) Temp src: Oral BP: 110/47 Pulse: 108 Heart Rate: 87 Resp: 20 SpO2: 94 % O2 Device: None (Room air)    Vitals:    07/23/18 1709 07/23/18 1711 07/23/18 1946   Weight: 78.5 kg (173 lb) 79.4 kg (175 lb) 61.3 kg (135 lb 2.3 oz)     Vital Signs with Ranges  Temp:  [97.1  F (36.2  C)-98.8  F (37.1  C)] 98.8  F (37.1  C)  Pulse:  [108] 108  Heart Rate:  [] 87  Resp:  [16-20] 20  BP: ()/(47-57) 110/47  SpO2:  [88 %-95 %] 94 %  I/O last 3 completed shifts:  In: 1030 [P.O.:1030]  Out: 845 [Urine:845]      General: Resting comfortably, alert, conversive, NAD  CVS: HRRR, no MGR, no LE edema  Respiratory: CTAB, no wheeze/rales/rhonchi, no increased work of breathing  GI: S, NT, ND, +BS  Skin: Warm/dry, no rashes    Discharge Disposition   Discharged to home  Condition at discharge: Stable    Consultations This Hospital Stay   GASTROENTEROLOGY IP CONSULT  ORTHOPEDIC SURGERY IP CONSULT    Time Spent on this Encounter   I, Echo Pineda, personally saw the patient today and spent greater than 30 minutes discharging this patient.    Discharge Orders     Reason for your hospital stay   Evaluation of your low blood counts and dizziness. Additionally, you were noted to develop swelling and pain in your knee and foot which was thought to be due to gout and improved with steroids.     Follow-up and recommended labs and tests    1.  INR and hemoglobin check at Dr. Bishop's office on 7/31/18.   2. Follow up with Dr. Bishop in the next week.     Activity   Your activity upon discharge: activity as tolerated     Diet   Follow this diet upon discharge: Regular       Discharge Medications   Current Discharge Medication List      START taking these medications    Details   acetaminophen (TYLENOL) 325 MG tablet Take 2 tablets (650 mg) by mouth every 4 hours as needed for mild pain  Qty: 100 tablet    Associated Diagnoses: Right knee pain, unspecified chronicity      ferrous sulfate (IRON) 325 (65 Fe) MG tablet Take 1 tablet (325 mg) by mouth 2 times daily  Qty: 60 tablet, Refills: 0    Associated Diagnoses: Iron deficiency anemia, unspecified iron deficiency anemia type      pantoprazole (PROTONIX) 40 MG EC tablet Take 1 tablet (40 mg) by mouth every morning (before breakfast)  Qty: 30 tablet, Refills: 0    Associated Diagnoses: Iron deficiency anemia, unspecified iron deficiency anemia type      predniSONE (DELTASONE) 10 MG tablet Take 20mg (2 tabs) po daily for 5 days, then decrease to 10mg (1 tab) po daily for 5 days, then stop.  Qty: 15 tablet, Refills: 0    Associated Diagnoses: Right knee pain, unspecified chronicity         CONTINUE these medications which have CHANGED    Details   metoprolol succinate (TOPROL-XL) 100 MG 24 hr tablet Take 0.5 tablets (50 mg) by mouth daily  Qty: 90 tablet, Refills: 3    Associated Diagnoses: Benign essential hypertension         CONTINUE these medications which have NOT CHANGED    Details   albuterol (PROAIR HFA/PROVENTIL HFA/VENTOLIN HFA) 108 (90 Base) MCG/ACT Inhaler Inhale 1-2 puffs into the lungs every 6 hours as needed for shortness of breath / dyspnea or wheezing  Qty: 3 Inhaler, Refills: 5    Comments: PLEASE DISPENSE PROAIR HFA, this is preferred by insurance  Associated Diagnoses: Chronic obstructive pulmonary disease, unspecified COPD type (H)      atorvastatin (LIPITOR) 10 MG tablet Take 1 tablet (10  "mg) by mouth daily  Qty: 90 tablet, Refills: 3    Associated Diagnoses: Mixed hyperlipidemia      camphor-menthol (DERMASARRA) 0.5-0.5 % LOTN Apply a thin layer to itchy areas as often as desired  Qty: 444 mL, Refills: 11    Associated Diagnoses: Pruritus      hydrOXYzine (ATARAX) 25 MG tablet Take 1 to 2 tablets (25-50 mg) by mouth every 6 hours as needed for itching  Qty: 120 tablet, Refills: 1    Associated Diagnoses: Chronic pruritus      tiotropium (SPIRIVA HANDIHALER) 18 MCG capsule Inhale 1 capsule (18 mcg) into the lungs daily  Qty: 90 capsule, Refills: 3    Associated Diagnoses: Chronic obstructive pulmonary disease, unspecified COPD type (H)      triamcinolone (KENALOG) 0.1 % cream Apply topically 2 times daily as needed for irritation      WARFARIN SODIUM PO Take 5 mg by mouth daily         STOP taking these medications       RANITIDINE HCL PO Comments:   Reason for Stopping:             Allergies   Allergies   Allergen Reactions     Gabapentin      Swelling       Lidocaine Blisters     Allergy to lidocaine ointment     Lasix [Furosemide] Rash     Penicillins Rash     \"broke out from injection\" 60 yrs ago       Data   Most Recent 3 CBC's:  Recent Labs   Lab Test  07/28/18   0851  07/27/18   0910  07/26/18   0808   WBC  11.0  10.3  7.5   HGB  7.1*  7.4*  7.6*   MCV  67*  68*  67*   PLT  193  205  210      Most Recent 3 BMP's:  Recent Labs   Lab Test  07/29/18   1050  07/28/18   0851  07/27/18   0910   NA  136  138  139   POTASSIUM  4.0  4.4  4.2   CHLORIDE  103  106  108   CO2  24  26  24   BUN  18  9  7   CR  1.07  1.02  1.12   ANIONGAP  9  6  7   AMRITA  8.3*  7.9*  7.9*   GLC  312*  117*  131*     Most Recent INR's and Anticoagulation Dosing History:  Anticoagulation Dose History     Recent Dosing and Labs Latest Ref Rng & Units 7/23/2018 7/24/2018 7/25/2018 7/26/2018 7/27/2018 7/28/2018 7/29/2018    Warfarin 5 mg - - - - - - 5 mg 5 mg    Warfarin 7.5 mg - - - - - 7.5 mg - -    INR 0.86 - 1.14 2.43(H) " 2.34(H) 2.02(H) 1.73(H) 1.56(H) 1.40(H) 1.55(H)    INR 0.86 - 1.14 - - - - - - -    INR Point of Care 0.86 - 1.14 - - - - - - -        Most Recent TSH, T4 and A1c Labs:  Recent Labs   Lab Test  02/21/18   1124  09/04/15   0407   TSH  2.38   --    T4  1.21   --    A1C   --   5.5     Results for orders placed or performed during the hospital encounter of 07/23/18   XR Chest 2 Views    Narrative    CHEST TWO VIEWS  7/23/2018 5:39 PM     HISTORY: Chest pain.    COMPARISON: 2/12/2018      Impression    IMPRESSION: Flattening of the diaphragms. Sternal wires. Otherwise  normal. No change.    RAI BLOOD MD   XR Toe Right G/E 2 Views    Narrative    RIGHT FIRST TOE THREE VIEWS   7/28/2018 6:22 AM     HISTORY: Right great toe pain.    COMPARISON: None.      Impression    IMPRESSION: Mild degenerative changes are noted involving the right  first MTP joint. No evidence for acute fracture or dislocation in the  right great toe. No convincing radiographic evidence for  osteomyelitis.    GREG GRAJEDA MD   XR Knee Right 3 Views    Narrative    RIGHT KNEE THREE VIEWS  7/28/2018 1:44 PM     HISTORY: Knee pain and swelling. Rule out fracture. Appears to have  effusion.     COMPARISON: None.      Impression    IMPRESSION: Moderate tricompartmental degenerative changes with  marginal osteophyte formation. Moderate joint effusion. Vascular  calcifications. No evidence of acute fracture or subluxation. Small  ossicles adjacent to the tibial tubercle and superior patellar likely  soft tissue calcifications or related to prior injury.    URSULA BOTELLO MD   XR Chest 2 Views    Narrative    CHEST TWO VIEWS  7/28/2018 1:44 PM     HISTORY: History of COPD with mild hypoxia, rule out infiltrate.     COMPARISON: Chest x-ray from 7/23/2018.      Impression    IMPRESSION: Heart size is normal. Pulmonary vasculature is not  distended. Evidence of previous sternotomy and valve surgery. No focal  infiltrates or evidence of pleural fluid. No  acute disease.    URSULA BOTELLO MD

## 2018-07-30 NOTE — PROGRESS NOTES
Patient has lab appointment for INR tomorrow, but no orders are in.  Please place orders for lab, as an INR only in order to be entered in to the system.   Thank you Lab,

## 2018-07-30 NOTE — CONSULTS
"Care Transition Initial Assessment - RN        Met with: Patient.  DATA   Active Problems:    Anemia       Cognitive Status: awake, alert and oriented.        Contact information and PCP information verified: Yes  Lives With: spouse  Living Arrangements: house     Insurance concerns: No Insurance issues identified  ASSESSMENT  Patient currently receives the following services:  No services        Identified issues/concerns regarding health management: No issues have been identified.  Pt has ambulated the halls this AM with good tolerance.  He is no longer having dizziness.  His Hgb and INR are low. Pt is scheduled to have these labs rechecked in his clinic tomorrow.     PLAN  Financial costs for the patient include NA.  Patient given options and choices for discharge: Discussed if he had any other needs that we could help him with, he reported \"No\".  Patient/family is agreeable to the plan?  Yes: Home today.  His spouse will provide  Transportation.  .  Patient anticipates needs for home equipment: No  Plan/Disposition: Home     Appointments:     7/31/18 labs  8/3/18 PCP Dr Bishop    Care  (CTS) will continue to follow as needed.          "

## 2018-07-30 NOTE — PLAN OF CARE
Problem: Patient Care Overview  Goal: Plan of Care/Patient Progress Review  Outcome: Improving  A+Ox4, up with SBA in room. Tolerating regular diet. No BM this shift, senna x1 given. Right knee pain resolved with ice packs. Hgb and INR check in 7/30 AM. Tele: NSR. VSS, on LS clear. RA, denies SOB, n/v. Nursing will continue to monitor. Possible discharge tomorrow 7/30 to home with home PT vs TCU.

## 2018-07-31 ENCOUNTER — TELEPHONE (OUTPATIENT)
Dept: DERMATOLOGY | Facility: CLINIC | Age: 83
End: 2018-07-31

## 2018-07-31 ENCOUNTER — TELEPHONE (OUTPATIENT)
Dept: FAMILY MEDICINE | Facility: CLINIC | Age: 83
End: 2018-07-31

## 2018-07-31 ENCOUNTER — ANTICOAGULATION THERAPY VISIT (OUTPATIENT)
Dept: FAMILY MEDICINE | Facility: CLINIC | Age: 83
End: 2018-07-31

## 2018-07-31 DIAGNOSIS — I26.99 PULMONARY EMBOLISM, BILATERAL (H): ICD-10-CM

## 2018-07-31 DIAGNOSIS — I48.0 PAROXYSMAL ATRIAL FIBRILLATION (H): ICD-10-CM

## 2018-07-31 DIAGNOSIS — Z79.01 LONG-TERM (CURRENT) USE OF ANTICOAGULANTS: ICD-10-CM

## 2018-07-31 DIAGNOSIS — R06.00 DYSPNEA, UNSPECIFIED TYPE: ICD-10-CM

## 2018-07-31 DIAGNOSIS — L29.9 PRURITUS: ICD-10-CM

## 2018-07-31 LAB
ALBUMIN SERPL-MCNC: 2.6 G/DL (ref 3.4–5)
ALP SERPL-CCNC: 65 U/L (ref 40–150)
ALT SERPL W P-5'-P-CCNC: 14 U/L (ref 0–70)
AST SERPL W P-5'-P-CCNC: 11 U/L (ref 0–45)
BASOPHILS # BLD AUTO: 0 10E9/L (ref 0–0.2)
BASOPHILS NFR BLD AUTO: 0.2 %
BILIRUB DIRECT SERPL-MCNC: 0.1 MG/DL (ref 0–0.2)
BILIRUB SERPL-MCNC: 0.3 MG/DL (ref 0.2–1.3)
DIFFERENTIAL METHOD BLD: ABNORMAL
EOSINOPHIL # BLD AUTO: 0.1 10E9/L (ref 0–0.7)
EOSINOPHIL NFR BLD AUTO: 1 %
ERYTHROCYTE [DISTWIDTH] IN BLOOD BY AUTOMATED COUNT: 19.9 % (ref 10–15)
HBA1C MFR BLD: 5.8 % (ref 0–5.6)
HCT VFR BLD AUTO: 25.3 % (ref 40–53)
HGB BLD-MCNC: 7.5 G/DL (ref 13.3–17.7)
INR PPP: 1.9 (ref 0.86–1.14)
LYMPHOCYTES # BLD AUTO: 1 10E9/L (ref 0.8–5.3)
LYMPHOCYTES NFR BLD AUTO: 8.4 %
MCH RBC QN AUTO: 19.9 PG (ref 26.5–33)
MCHC RBC AUTO-ENTMCNC: 29.6 G/DL (ref 31.5–36.5)
MCV RBC AUTO: 67 FL (ref 78–100)
MONOCYTES # BLD AUTO: 1.3 10E9/L (ref 0–1.3)
MONOCYTES NFR BLD AUTO: 10.8 %
NEUTROPHILS # BLD AUTO: 9.2 10E9/L (ref 1.6–8.3)
NEUTROPHILS NFR BLD AUTO: 79.6 %
PLATELET # BLD AUTO: 280 10E9/L (ref 150–450)
PROT SERPL-MCNC: 6.3 G/DL (ref 6.8–8.8)
RBC # BLD AUTO: 3.77 10E12/L (ref 4.4–5.9)
TSH SERPL DL<=0.005 MIU/L-ACNC: 2.88 MU/L (ref 0.4–4)
WBC # BLD AUTO: 11.6 10E9/L (ref 4–11)

## 2018-07-31 PROCEDURE — 85610 PROTHROMBIN TIME: CPT | Performed by: DERMATOLOGY

## 2018-07-31 PROCEDURE — 99207 ZZC NO CHARGE NURSE ONLY: CPT | Performed by: INTERNAL MEDICINE

## 2018-07-31 PROCEDURE — 84443 ASSAY THYROID STIM HORMONE: CPT | Performed by: DERMATOLOGY

## 2018-07-31 PROCEDURE — 36415 COLL VENOUS BLD VENIPUNCTURE: CPT | Performed by: DERMATOLOGY

## 2018-07-31 PROCEDURE — 80076 HEPATIC FUNCTION PANEL: CPT | Performed by: DERMATOLOGY

## 2018-07-31 PROCEDURE — 83036 HEMOGLOBIN GLYCOSYLATED A1C: CPT | Performed by: DERMATOLOGY

## 2018-07-31 PROCEDURE — 85025 COMPLETE CBC W/AUTO DIFF WBC: CPT | Performed by: DERMATOLOGY

## 2018-07-31 NOTE — TELEPHONE ENCOUNTER
The patient was just discharged from the hospital for anemia and that the discharge hemoglobin was 7.1 so 7.5 is a stable hemoglobin, hopefully I will see the patient soon in hospital follow up

## 2018-07-31 NOTE — TELEPHONE ENCOUNTER
Spoke with Criselda BROOKS with [facility]- Dr. Aviles's office   They rom labs today and wanted to notify PCP of critical low Hgb value today    Routing to both PCP and DOD (PCP out of office today)     CBC RESULTS:   Recent Labs   Lab Test  07/31/18   1104   WBC  11.6*   RBC  3.77*   HGB  7.5*   HCT  25.3*   MCV  67*   MCH  19.9*   MCHC  29.6*   RDW  19.9*   PLT  280       Yamilex EVERETT RN

## 2018-07-31 NOTE — TELEPHONE ENCOUNTER
Noted. Patient is scheduled for a hospital follow up with Dr. Bishop   Next 5 appointments (look out 90 days)     Aug 03, 2018  2:30 PM CDT   [facility]   [facility]    [address] [facility] [address]   [phone]                Yamilex EVERETT RN

## 2018-07-31 NOTE — TELEPHONE ENCOUNTER
Symptomatic Anemia, Right Knee Pain, Unspecified Chronicity 07/30/2018 6 mo Ed/IP 0/1  [phone] (home)     Hosp f/u 8/3/18 with PCP

## 2018-07-31 NOTE — PLAN OF CARE
Problem: Patient Care Overview  Goal: Plan of Care/Patient Progress Review  Occupational Therapy Discharge Summary    Reason for therapy discharge:    Discharged to home.    Progress towards therapy goal(s). See goals on Care Plan in [facility] electronic health record for goal details.  Goals not met.  Barriers to achieving goals:   discharge from facility.    Therapy recommendation(s):    No further therapy is recommended.

## 2018-07-31 NOTE — TELEPHONE ENCOUNTER
"INR   Date Value Ref Range Status   07/31/2018 1.90 (H) 0.86 - 1.14 Final     Comment:     This test is intended for monitoring Coumadin therapy.  Results are not   accurate in patients with prolonged INR due to factor deficiency.  Performed at Point of Care              Hospital/TCU/ED for chronic condition Discharge Protocol    \"Hi, my name is Dione Bree, a registered nurse, and I am calling from [facility].  I am calling to follow up and see how things are going for you after your recent emergency visit/hospital/TCU stay.\"    Tell me how you are doing now that you are home?\" Doing good      Discharge Instructions    \"Let's review your discharge instructions.  What is/are the follow-up recommendations?  Pt. Response: Reviewed meds, has appt scheduled, reviewed and dosed INR, no further questions    \"Has an appointment with your primary care provider been scheduled?\"   Yes. (confirm)    \"When you see the provider, I would recommend that you bring your medications with you.\"    Medications    \"Tell me what changed about your medicines when you discharged?\"    Changes to chronic meds?    0-1    \"What questions do you have about your medications?\"    INR dosing     New diagnoses of heart failure, COPD, diabetes, or MI?    No          On warfarin: \"Were you given any recommendations for follow-up with the anticoagulation clinic?\" Yes - need to schedule/reschedule Anticoagulation clinic appointment    Medication reconciliation completed? Yes  Was MTM referral placed (*Make sure to put transitions as reason for referral)?   No    Call Summary    \"What questions or concerns do you have about your recent visit and your follow-up care?\"     none    \"If you have questions or things don't continue to improve, we encourage you contact us through the main clinic number (give number).  Even if the clinic is not open, triage nurses are available 24/7 to help you.     We would like you to know that our clinic has extended " "hours (provide information).  We also have urgent care (provide details on closest location and hours/contact info)\"      \"Thank you for your time and take care!\"             "

## 2018-07-31 NOTE — TELEPHONE ENCOUNTER
[facility] Call Center    Phone Message    May a detailed message be left on voicemail: yes    Reason for Call: Other: Vannessa from  [facility] has a critical value for hemoglobin that she needs to report. Pt's hemoglobin is at 7.5. Vannessa says this was ordered by Dr Aviles.     Action Taken: Message routed to:  [facility] ([facility]

## 2018-07-31 NOTE — TELEPHONE ENCOUNTER
ED / Discharge Outreach Protocol    Patient Contact    Attempt # 1    Was call answered?  No.  Left message on voicemail with information to call me back.  Ana Cardenas RN- Triage [employer]

## 2018-07-31 NOTE — PROGRESS NOTES
ANTICOAGULATION FOLLOW-UP CLINIC VISIT    Patient Name:  Hermilo Velasquez  Date:  7/31/2018  Contact Type:  Telephone/ with pt    SUBJECTIVE:     Patient Findings     Positives Hospital admission    Comments Recent hospitalization, with held coumadin in hospitalization, updated flowsheet with current dosing. Recheck Friday after Dr. Bishop appt.               OBJECTIVE    INR   Date Value Ref Range Status   07/31/2018 1.90 (H) 0.86 - 1.14 Final     Comment:     This test is intended for monitoring Coumadin therapy.  Results are not   accurate in patients with prolonged INR due to factor deficiency.  Performed at Point of Care         ASSESSMENT / PLAN  INR assessment THER    Recheck INR In: 3 DAYS    INR Location Clinic      Anticoagulation Summary as of 7/31/2018     INR goal 2.0-3.0   Today's INR 1.90!   Warfarin maintenance plan 5 mg (5 mg x 1) every day   Full warfarin instructions 5 mg every day   Weekly warfarin total 35 mg   Weekly max warfarin dose 45 mg   No change documented Dione Giron RN   Plan last modified Janneth Allen RN (8/22/2017)   Next INR check 8/3/2018   Target end date Indefinite    Indications   Long-term (current) use of anticoagulants [Z79.01] [Z79.01]  Pulmonary embolism  bilateral (H) [I26.99]  Paroxysmal atrial fibrillation (H) [I48.0]         Anticoagulation Episode Summary     INR check location [facility]    Preferred lab     Send INR reminders to [facility] ANTICOAGULATION    Comments       Anticoagulation Care Providers     Provider Role Specialty Phone number    Karson Bishop MD [facility] [phone]            See the Encounter Report to view Anticoagulation Flowsheet and Dosing Calendar (Go to Encounters tab in chart review, and find the Anticoagulation Therapy Visit)    Dosage adjustment made based on physician directed care plan.    Dione Giron, RN

## 2018-07-31 NOTE — TELEPHONE ENCOUNTER
"\"This patient came in May for evaluation of the pruritus and it seems that the blood tests were only now done. I havent seen the patient since May.     Most important is in this situation that the GP is informed about the low Hb and he will decide if further actions are necessary. It seems this is done.     If necessary we see the patient for his pruritus in the next weeks and go with him through the blood results.     Kind regards     PB\"        Called Dr. Bishop's PCP office and spoke with Yamilex BROOKS. Informed her of Hemaglobin 7.5 g/dL.  She will notify the provider Dr. Bishop.    Criselda Romano RN     "

## 2018-07-31 NOTE — MR AVS SNAPSHOT
Hermilo MADRID Leakevin   7/31/2018   Anticoagulation Therapy Visit    Description:  85 year old male   Provider:  Karson Bishop MD   Department:  [facility]           INR as of 7/31/2018     Today's INR 1.90!      Anticoagulation Summary as of 7/31/2018     INR goal 2.0-3.0   Today's INR 1.90!   Full warfarin instructions 5 mg every day   Next INR check 8/3/2018    Indications   Long-term (current) use of anticoagulants [Z79.01] [Z79.01]  Pulmonary embolism  bilateral (H) [I26.99]  Paroxysmal atrial fibrillation (H) [I48.0]         Your next Anticoagulation Clinic appointment(s)     Aug 03, 2018  3:00 PM CDT   Anticoagulation Visit with  ANTICOAGULATION CLINIC   [facility]    [address]  [place] [address]   [phone]            Aug 15, 2018 11:30 AM CDT   Anticoagulation Visit with  ANTICOAGULATION CLINIC   [facility]    [address]  [place] [address]   [phone]              July 2018 Details    Sun Mon Tue Wed Thu Fri Sat     1               2               3               4               5               6               7                 8               9               10               11               12               13               14                 15               16               17               18               19               20               21                 22               23               24               25               26               27               28                 29               30               31      5 mg   See details           Date Details   07/31 This INR check               How to take your warfarin dose     To take:  5 mg Take 1 of the 5 mg tablets.           August 2018 Details    Sun Mon Tue Wed Thu Fri Sat        1      5 mg         2      5 mg         3            4                 5               6               7               8               9               10                11                 12               13               14               15               16               17               18                 19               20               21               22               23               24               25                 26               27               28               29               30               31                 Date Details   No additional details    Date of next INR:  8/3/2018         How to take your warfarin dose     To take:  5 mg Take 1 of the 5 mg tablets.

## 2018-08-01 ENCOUNTER — PATIENT OUTREACH (OUTPATIENT)
Dept: CARE COORDINATION | Facility: CLINIC | Age: 83
End: 2018-08-01

## 2018-08-01 ASSESSMENT — ACTIVITIES OF DAILY LIVING (ADL): DEPENDENT_IADLS:: INDEPENDENT

## 2018-08-01 NOTE — TELEPHONE ENCOUNTER
" \"This patient came in May for evaluation of the pruritus and it seems that the blood tests were only now done. I havent seen the patient since May.     Most important is in this situation that the GP is informed about the low Hb and he will decide if further actions are necessary. It seems this is done.     If necessary we see the patient for his pruritus in the next weeks and go with him through the blood results.     Kind regards     PB \"    Patient being followed for lab work.   "

## 2018-08-01 NOTE — PROGRESS NOTES
"Clinic Care Coordination Contact    Clinic Care Coordination Contact  OUTREACH    Referral Information:  Referral Source: IP Handoff    Primary Diagnosis: Hematological Disorder    Chief Complaint   Patient presents with     Clinic Care Coordination - Post Hospital     CTS        Universal Utilization:   Difficulty keeping appointments:: No  Utilization    Last refreshed: 7/31/2018  7:04 PM:  No Show Count (past year) 0       Last refreshed: 7/31/2018  7:04 PM:  ED visits 0       Last refreshed: 7/31/2018  7:04 PM:  Hospital admissions 1          Current as of: 7/31/2018  7:04 PM             Clinical Concerns:  Current Medical Concerns:  Patient was recently seen in the hospital for relative hypotension/bradycardia and acute on chronic anemia. Patient was treated and discharged home.    Spoke with the patient today who stated is he feeling \"good\". Patient denied any fatigue or increased weakness. Patient able to discuss medication changes and reported his blood pressure today to writer. Patient denied any concerns. Patient will attend his follow up appointment on Friday 8/3.     Current Behavioral Concerns: No concerns at this time.     Education Provided to patient: Care Coordination.      Pain  Chronic pain (GOAL):: No  Health Maintenance Reviewed: Due/Overdue     Medication Management:  Reviewed medications with the patient. Patient denied any concerns. Patient adhering to his regimen.     Functional Status:  Dependent ADLs:: Ambulation-cane  Dependent IADLs:: Independent  Mobility Status: Independent w/Device  Fallen 2 or more times in the past year?: No    Living Situation:  Current living arrangement:: I live in a private home with spouse    Diet/Exercise/Sleep:  Diet:: No added salt, Low cholesterol, Low saturated fat  Inadequate nutrition (GOAL):: No  Food Insecurity: No  Tube Feeding: No  Exercise:: Currently not exercising  Inadequate activity/exercise (GOAL):: No  Significant changes in sleep pattern " (GOAL): No    Transportation:  Transportation concerns (GOAL):: No  Transportation means:: Regular car, Family     Psychosocial:  Amish or spiritual beliefs that impact treatment:: No  Mental health DX:: No  Mental health management concern (GOAL):: No  Informal Support system:: Family     Financial/Insurance: UCARE for Seniors   Financial/Insurance concerns (GOAL):: No       Resources and Interventions:  Community Resources: None  Supplies used at home:: None  Equipment Currently Used at Home: cane, straight    Advance Care Plan/Directive  Advanced Care Plans/Directives on file:: Yes  Type Advanced Care Plans/Directives: POLST    Referrals Placed: None     Patient/Caregiver understanding: Patient verbalized understanding, engaged in AIDET communication behavior during encounter.         Future Appointments              In 2 days Karson Bishop MD [facility] [place]    [facility]    In 2 weeks  ANTICOAGULATION CLINIC [facility] [place]          Plan:   Patient to attend his follow up appointment with his provider. Patient will call the clinic should any concerns arise. RNCC will do no further outreaches at this time as the patient declined any concerns.     Elsy Sinclair RN  Clinic Care Coordinator  [phone]  [email or web address]

## 2018-08-02 ENCOUNTER — ALLIED HEALTH/NURSE VISIT (OUTPATIENT)
Dept: PHARMACY | Facility: CLINIC | Age: 83
End: 2018-08-02
Attending: INTERNAL MEDICINE
Payer: COMMERCIAL

## 2018-08-02 DIAGNOSIS — I48.0 PAROXYSMAL ATRIAL FIBRILLATION (H): Primary | ICD-10-CM

## 2018-08-02 DIAGNOSIS — D50.9 IRON DEFICIENCY ANEMIA, UNSPECIFIED IRON DEFICIENCY ANEMIA TYPE: ICD-10-CM

## 2018-08-02 DIAGNOSIS — M25.561 RIGHT KNEE PAIN, UNSPECIFIED CHRONICITY: ICD-10-CM

## 2018-08-02 DIAGNOSIS — J44.9 CHRONIC OBSTRUCTIVE PULMONARY DISEASE, UNSPECIFIED COPD TYPE (H): ICD-10-CM

## 2018-08-02 DIAGNOSIS — L29.9 ITCHING: ICD-10-CM

## 2018-08-02 DIAGNOSIS — K21.9 GASTROESOPHAGEAL REFLUX DISEASE WITHOUT ESOPHAGITIS: ICD-10-CM

## 2018-08-02 DIAGNOSIS — Z86.718 PERSONAL HISTORY OF DVT (DEEP VEIN THROMBOSIS): ICD-10-CM

## 2018-08-02 DIAGNOSIS — E78.5 HYPERLIPIDEMIA LDL GOAL <130: ICD-10-CM

## 2018-08-02 DIAGNOSIS — I10 ESSENTIAL HYPERTENSION WITH GOAL BLOOD PRESSURE LESS THAN 140/90: ICD-10-CM

## 2018-08-02 PROCEDURE — 99607 MTMS BY PHARM ADDL 15 MIN: CPT | Performed by: PHARMACIST

## 2018-08-02 PROCEDURE — 99605 MTMS BY PHARM NP 15 MIN: CPT | Performed by: PHARMACIST

## 2018-08-02 RX ORDER — LORATADINE 10 MG/1
10 TABLET ORAL DAILY PRN
Status: ON HOLD | COMMUNITY
End: 2020-08-15

## 2018-08-02 NOTE — LETTER
[facility]  [address] [place] [id number]  [phone]      August 2, 2018    Hermilo Velasquez                                                                                                                [address]  [facility] [address]      Dear Hermilo,    It was nice to speak with you on 8/2/18.  I hope I was able to give you some useful information during our Medication Therapy Management (MTM) visit. The purpose of this visit with a clinical pharmacist was to review the medicines you received when discharged from the hospital. We want to make sure that you know which medicines to take and what they are for. We also want to make sure all your medicines are working, safe, and as easy to take as possible.    Enclosed is a summary of the suggestions we talked about and any other thoughts I had. There is also a list of your medicines included. This information has also been shared with your primary care provider.    Feel free to call if you have any questions or concerns. By working together with you and your doctor, I hope to help you feel confident managing your medicines and improving your quality of life.       Best wishes,         Cruz Ramirez, PharmD, [facility]  Medication Therapy Management Pharmacist  Pager: [phone]

## 2018-08-02 NOTE — PATIENT INSTRUCTIONS
Recommendations from today's MTM visit:                                                    MTM (medication therapy management) is a service provided by a clinical pharmacist designed to help you get the most of out of your medicines.   Today we reviewed what your medicines are for, how to know if they are working, that your medicines are safe and how to make your medicine regimen as easy as possible.     1. You will likely receive an explanation of benefits/notice of services not covered by your [facility] insurance. This is not a bill. This visit was covered today, but charges were submitted to [facility] in order for [facility] to then write them off. You will not be charged for this visit and should not receive a bill.    2. Continue to take your medications as prescribed.     Next MTM visit: As Needed    To schedule another MTM appointment, please call the clinic directly or you may call the MTM scheduling line at [phone] or toll-free at [phone].     My Clinical Pharmacist's contact information:                                                      It was a pleasure talking to you today!  Please feel free to contact me with any questions or concerns you have.      Cruz Ramirez, PharmD, [facility]  Medication Therapy Management Pharmacist  Pager: [phone]    You may receive a survey about the MTM services you received.  I would appreciate your feedback to help me serve you better in the future. Please fill it out and return it when you can. Your comments will be anonymous.

## 2018-08-02 NOTE — MR AVS SNAPSHOT
After Visit Summary   8/2/2018    Hermilo Velasquez    MRN: [id number]           Patient Information     Date Of Birth          11/3/1932        Visit Information        Provider Department      8/2/2018 2:30 PM Giuliano Ramirez, [facility] Instructions    Recommendations from today's MTM visit:                                                    MTM (medication therapy management) is a service provided by a clinical pharmacist designed to help you get the most of out of your medicines.   Today we reviewed what your medicines are for, how to know if they are working, that your medicines are safe and how to make your medicine regimen as easy as possible.     1. You will likely receive an explanation of benefits/notice of services not covered by your [facility] insurance. This is not a bill. This visit was covered today, but charges were submitted to [facility] in order for [facility] to then write them off. You will not be charged for this visit and should not receive a bill.    2. Continue to take your medications as prescribed.     Next MTM visit: As Needed    To schedule another MTM appointment, please call the clinic directly or you may call the MTM scheduling line at [phone] or toll-free at [phone].     My Clinical Pharmacist's contact information:                                                      It was a pleasure talking to you today!  Please feel free to contact me with any questions or concerns you have.      Cruz Ramirez, PharmD, [facility]  Medication Therapy Management Pharmacist  Pager: [phone]    You may receive a survey about the MTM services you received.  I would appreciate your feedback to help me serve you better in the future. Please fill it out and return it when you can. Your comments will be anonymous.                  Follow-ups after your visit        Your next 10 appointments already scheduled     Aug 03, 2018  2:30 PM CDT   Office Visit  with Karson Bishop MD   [facility]    [address] [place] [id number] [phone]           Bring a current list of meds and any records pertaining to this visit. For Physicals, please bring immunization records and any forms needing to be filled out. Please arrive 10 minutes early to complete paperwork.            Aug 03, 2018  3:00 PM CDT   Anticoagulation Visit with CS ANTICOAGULATION CLINIC   [facility] ([facility]    [address]  [place] [address] [phone]            Aug 15, 2018 11:30 AM CDT   Anticoagulation Visit with CS ANTICOAGULATION CLINIC   [facility]    [address]  [place] [address] [phone]              Who to contact     If you have questions or need follow up information about today's clinic visit or your schedule please contact [facility] directly at [phone].  Normal or non-critical lab and imaging results will be communicated to you by MyChart, letter or phone within 4 business days after the clinic has received the results. If you do not hear from us within 7 days, please contact the clinic through MyChart or phone. If you have a critical or abnormal lab result, we will notify you by phone as soon as possible.  Submit refill requests through [employer] or call your pharmacy and they will forward the refill request to us. Please allow 3 business days for your refill to be completed.          Additional Information About Your Visit        Care EveryWhere ID     This is your Care EveryWhere ID. This could be used by other organizations to access your [facility] medical records  [id number]         Blood Pressure from Last 3 Encounters:   07/30/18 110/47   07/23/18 (!) 90/30   05/18/18 131/69    Weight from Last 3 Encounters:   07/23/18 135 lb 2.3 oz (61.3 kg)   07/23/18 174 lb (78.9 kg)   03/26/18 188 lb (85.3 kg)              Today, you had  the following     No orders found for display       Primary Care Provider Office Phone # Fax #    Karson Bishop [phone] [address] [address]        Equal Access to Services     YUE CORDERO : Hadii aad ku hadwillo Soomaali, waaxda luqadaha, qaybta kaalmada adeegyada, waxsamson idiin hayaan trae booker ladominguezmartha . So [facility] [phone].    ATENCIÓN: Si habla español, tiene a randle disposición servicios gratuitos de asistencia lingüística. Llame al [phone].    We comply with applicable federal civil rights laws and [place] laws. We do not discriminate on the basis of race, color, national origin, age, disability, sex, sexual orientation, or gender identity.            Thank you!     Thank you for choosing [facility]  for your care. Our goal is always to provide you with excellent care. Hearing back from our patients is one way we can continue to improve our services. Please take a few minutes to complete the written survey that you may receive in the mail after your visit with us. Thank you!             Your Updated Medication List - Protect others around you: Learn how to safely use, store and throw away your medicines at www.disposemymeds.org.          This list is accurate as of 8/2/18  3:31 PM.  Always use your most recent med list.                   Brand Name Dispense Instructions for use Diagnosis    acetaminophen 325 MG tablet    TYLENOL    100 tablet    Take 2 tablets (650 mg) by mouth every 4 hours as needed for mild pain    Right knee pain, unspecified chronicity       albuterol 108 (90 Base) MCG/ACT Inhaler    PROAIR HFA/PROVENTIL HFA/VENTOLIN HFA    3 Inhaler    Inhale 1-2 puffs into the lungs every 6 hours as needed for shortness of breath / dyspnea or wheezing    Chronic obstructive pulmonary disease, unspecified COPD type (H)       atorvastatin 10 MG tablet    LIPITOR    90 tablet    Take 1 tablet (10 mg) by mouth daily    Mixed  hyperlipidemia       camphor-menthol 0.5-0.5 % Lotn    DERMASARRA    444 mL    Apply a thin layer to itchy areas as often as desired    Pruritus       ferrous sulfate 325 (65 Fe) MG tablet    IRON    60 tablet    Take 1 tablet (325 mg) by mouth 2 times daily    Iron deficiency anemia, unspecified iron deficiency anemia type       hydrOXYzine 25 MG tablet    ATARAX    120 tablet    Take 1 to 2 tablets (25-50 mg) by mouth every 6 hours as needed for itching    Chronic pruritus       loratadine 10 MG tablet    CLARITIN     Take 10 mg by mouth daily        metoprolol succinate 100 MG 24 hr tablet    TOPROL-XL    90 tablet    Take 0.5 tablets (50 mg) by mouth daily    Benign essential hypertension       pantoprazole 40 MG EC tablet    PROTONIX    30 tablet    Take 1 tablet (40 mg) by mouth every morning (before breakfast)    Iron deficiency anemia, unspecified iron deficiency anemia type       predniSONE 10 MG tablet    DELTASONE    15 tablet    Take 20mg (2 tabs) po daily for 5 days, then decrease to 10mg (1 tab) po daily for 5 days, then stop.    Right knee pain, unspecified chronicity       tiotropium 18 MCG capsule    SPIRIVA HANDIHALER    90 capsule    Inhale 1 capsule (18 mcg) into the lungs daily    Chronic obstructive pulmonary disease, unspecified COPD type (H)       triamcinolone 0.1 % cream    KENALOG     Apply topically 2 times daily as needed for irritation        WARFARIN SODIUM PO      Take 5 mg by mouth daily

## 2018-08-02 NOTE — PROGRESS NOTES
SUBJECTIVE/OBJECTIVE:                Hermilo Velasquez is a 85 year old male called for a transitions of care visit.  He was discharged from [facility] on 7/30/18 for hypotension/bradycardia/anemia.     Chief Complaint: Hospital Follow-Up.    Allergies/ADRs: Reviewed in Epic  Tobacco: History of tobacco dependence - quit 1998   Alcohol: 4-6 beverages / week  Caffeine: 2 cups/day of coffee  Activity: Good activity level since home  PMH: Reviewed in Epic    Medication Adherence/Access:  Patient takes medications directly from bottles.  Patient takes medications 2 time(s) per day.  Per patient, misses medication 0 times per week.   Medication barriers: none.   The patient fills medications at [facility]: NO, fills medications at [facility].    AFib/Hx of DVT/Hx of PE/Hypertension: Current medications include metoprolol XL 50 mg daily and warfarin managed by the  anticoagulation service.  Last INR was 1.9. Patient does self-monitor BP. Home BP monitoring in range of 's systolic over 50's diastolic. Pulses have been in the 60 bpm. Patient reports no current medication side effects - dizziness has improved.  Has follow-up with PCP tomorrow.    Anemia/GERD: Current medications include: Protonix (pantoprazole) 40 mg once daily and ferrous sulfate 325 mg twice daily. Pt c/o no current symptoms. Did develop some itchiness on omeprazole Patient feels that current regimen is effective.  Hemoglobin   Date Value Ref Range Status   07/31/2018 7.5 (L) 13.3 - 17.7 g/dL Final     Comment:     Reviewed: OK with previous  Critical Value called to and read back by  GIVEN TO OSMANI ON 7.31.18 AT 1156 BY ARM       COPD: Current medications: Spiriva Handihaler 18 mcg daily and albuterol HFA PRN (rarely).   Pt is not experiencing side effects.   Pt reports the following symptoms: none.  Pt does have an COPD Action Plan on file.   Has spirometry been completed: Yes     Hyperlipidemia: Current therapy includes atorvastatin 10 mg  once daily.  Pt reports no significant myalgias or other side effects.    Itching: Currently taking hydroxyzine 25 mg every 6 hours PRN, triamcinolone 0.1% cream PRN, Camphor-menthol lotion PRN, and loratadine 10 mg daily. Pt finds this to be effective for itching and prevents him from itching skin to the point of bleeding. Pt reports no issues.     Knee Pain: Currently taking prednisone 20 mg for two more days and then decrease to 10 mg daily for five days. Has APAP available for PRN use, but does not take regularly. Pt finds this to be effective. Pt reports denies any issues.     Today's Vitals: There were no vitals taken for this visit. - telephone encounter, no vitals    BP Readings from Last 3 Encounters:   07/30/18 110/47   07/23/18 (!) 90/30   05/18/18 131/69     Last Comprehensive Metabolic Panel:  Sodium   Date Value Ref Range Status   07/29/2018 136 133 - 144 mmol/L Final     Potassium   Date Value Ref Range Status   07/29/2018 4.0 3.4 - 5.3 mmol/L Final     Chloride   Date Value Ref Range Status   07/29/2018 103 94 - 109 mmol/L Final     Carbon Dioxide   Date Value Ref Range Status   07/29/2018 24 20 - 32 mmol/L Final     Anion Gap   Date Value Ref Range Status   07/29/2018 9 3 - 14 mmol/L Final     Glucose   Date Value Ref Range Status   07/29/2018 312 (H) 70 - 99 mg/dL Final     Urea Nitrogen   Date Value Ref Range Status   07/29/2018 18 7 - 30 mg/dL Final     Creatinine   Date Value Ref Range Status   07/29/2018 1.07 0.66 - 1.25 mg/dL Final     GFR Estimate   Date Value Ref Range Status   07/29/2018 66 >60 mL/min/1.7m2 Final     Comment:     Non  GFR Calc     Calcium   Date Value Ref Range Status   07/29/2018 8.3 (L) 8.5 - 10.1 mg/dL Final     Lab Results   Component Value Date    A1C 5.8 07/31/2018    A1C 5.5 09/04/2015    A1C 5.8 07/30/2015     ASSESSMENT:                 Current medications were reviewed today.      Medication Adherence: excellent, no issues identified    AFib/Hx of  DVT/Hx of PE/Hypertension: Improved. Pt's BP is still lower based upon home readings, but pulse and blood pressure is up and patient is not having symptoms. Follow-up in place.     Anemia/GERD: Improved. Plan for follow-up in place.     COPD: Stable.     Hyperlipidemia: Stable. Pt is on moderate intensity statin which is indicated based on 2013 ACC/AHA guidelines for lipid management.      Itching: Improved.     Knee Pain: Improved.    PLAN:                  1. Continue current therapy.    I spent 30 minutes with this patient today. A copy of the visit note was provided to the patient's primary care provider.    Will follow up in 2-4 weeks.    The patient was mailed a summary of these recommendations as an after visit summary.    Cruz Ramirez, EmeliD, BCACP  Medication Therapy Management Pharmacist  Pager: [phone]

## 2018-08-03 ENCOUNTER — OFFICE VISIT (OUTPATIENT)
Dept: FAMILY MEDICINE | Facility: CLINIC | Age: 83
End: 2018-08-03
Payer: COMMERCIAL

## 2018-08-03 ENCOUNTER — ANTICOAGULATION THERAPY VISIT (OUTPATIENT)
Dept: NURSING | Facility: CLINIC | Age: 83
End: 2018-08-03
Payer: COMMERCIAL

## 2018-08-03 VITALS
HEIGHT: 70 IN | TEMPERATURE: 97.8 F | HEART RATE: 62 BPM | BODY MASS INDEX: 25.04 KG/M2 | SYSTOLIC BLOOD PRESSURE: 128 MMHG | DIASTOLIC BLOOD PRESSURE: 67 MMHG | OXYGEN SATURATION: 96 % | WEIGHT: 174.9 LBS

## 2018-08-03 DIAGNOSIS — M25.561 CHRONIC PAIN OF RIGHT KNEE: ICD-10-CM

## 2018-08-03 DIAGNOSIS — K21.9 GASTROESOPHAGEAL REFLUX DISEASE WITHOUT ESOPHAGITIS: ICD-10-CM

## 2018-08-03 DIAGNOSIS — I48.0 PAROXYSMAL ATRIAL FIBRILLATION (H): ICD-10-CM

## 2018-08-03 DIAGNOSIS — G89.29 CHRONIC PAIN OF RIGHT KNEE: ICD-10-CM

## 2018-08-03 DIAGNOSIS — D50.9 IRON DEFICIENCY ANEMIA, UNSPECIFIED IRON DEFICIENCY ANEMIA TYPE: Primary | ICD-10-CM

## 2018-08-03 DIAGNOSIS — I26.99 PULMONARY EMBOLISM, BILATERAL (H): ICD-10-CM

## 2018-08-03 LAB — INR POINT OF CARE: 2.4 (ref 0.86–1.14)

## 2018-08-03 PROCEDURE — 99495 TRANSJ CARE MGMT MOD F2F 14D: CPT | Performed by: INTERNAL MEDICINE

## 2018-08-03 PROCEDURE — 36416 COLLJ CAPILLARY BLOOD SPEC: CPT

## 2018-08-03 PROCEDURE — 85610 PROTHROMBIN TIME: CPT | Mod: QW

## 2018-08-03 PROCEDURE — 99207 ZZC NO CHARGE NURSE ONLY: CPT

## 2018-08-03 RX ORDER — FERROUS SULFATE 325(65) MG
325 TABLET ORAL 2 TIMES DAILY
Qty: 180 TABLET | Refills: 3 | Status: SHIPPED | OUTPATIENT
Start: 2018-08-03 | End: 2018-08-28

## 2018-08-03 RX ORDER — PANTOPRAZOLE SODIUM 40 MG/1
40 TABLET, DELAYED RELEASE ORAL
Qty: 90 TABLET | Refills: 3 | Status: SHIPPED | OUTPATIENT
Start: 2018-08-03 | End: 2018-08-13

## 2018-08-03 NOTE — MR AVS SNAPSHOT
Hermilo MADRID Eva   8/3/2018 3:00 PM   Anticoagulation Therapy Visit    Description:  85 year old male   Provider:   ANTICOAGULATION CLINIC   Department:   Nurse           INR as of 8/3/2018     Today's INR 2.4      Anticoagulation Summary as of 8/3/2018     INR goal 2.0-3.0   Today's INR 2.4   Full warfarin instructions 5 mg every day   Next INR check 8/22/2018    Indications   Long-term (current) use of anticoagulants [Z79.01] [Z79.01]  Pulmonary embolism  bilateral (H) [I26.99]  Paroxysmal atrial fibrillation (H) [I48.0]         Your next Anticoagulation Clinic appointment(s)     Aug 22, 2018  2:45 PM CDT   Anticoagulation Visit with  ANTICOAGULATION CLINIC   [facility] [place] ([facility])    [address]  [place] [address]   [phone]              Contact Numbers     Clinic Number:         August 2018 Details    Sun Mon Tue Wed Thu Fri Sat        1               2               3      5 mg   See details      4      5 mg           5      5 mg         6      5 mg         7      5 mg         8      5 mg         9      5 mg         10      5 mg         11      5 mg           12      5 mg         13      5 mg         14      5 mg         15      5 mg         16      5 mg         17      5 mg         18      5 mg           19      5 mg         20      5 mg         21      5 mg         22            23               24               25                 26               27               28               29               30               31                 Date Details   08/03 This INR check       Date of next INR:  8/22/2018         How to take your warfarin dose     To take:  5 mg Take 1 of the 5 mg tablets.

## 2018-08-03 NOTE — MR AVS SNAPSHOT
After Visit Summary   8/3/2018    Hermilo Velasquez    MRN: [id number]           Patient Information     Date Of Birth          11/3/1932        Visit Information        Provider Department      8/3/2018 2:30 PM Karson Bishop MD [facility]        Today's Diagnoses     Iron deficiency anemia, unspecified iron deficiency anemia type    -  1    Paroxysmal atrial fibrillation (H)        Pulmonary embolism, bilateral (H)        Gastroesophageal reflux disease without esophagitis        Chronic pain of right knee           Follow-ups after your visit        Your next 10 appointments already scheduled     Aug 22, 2018  2:45 PM CDT   Anticoagulation Visit with [facility]   [facility]    [address] [facility] [id number] [phone]            Sep 07, 2018  1:30 PM CDT   Office Visit with Karson Bishop MD   [facility] ([facility]    [address] [facility] [id number] [phone]           Bring a current list of meds and any records pertaining to this visit. For Physicals, please bring immunization records and any forms needing to be filled out. Please arrive 10 minutes early to complete paperwork.              Who to contact     If you have questions or need follow up information about today's clinic visit or your schedule please contact [facility] directly at [phone].  Normal or non-critical lab and imaging results will be communicated to you by MyChart, letter or phone within 4 business days after the clinic has received the results. If you do not hear from us within 7 days, please contact the clinic through MyChart or phone. If you have a critical or abnormal lab result, we will notify you by phone as soon as possible.  Submit refill requests through [employer] or call your pharmacy and they will forward the refill request to us. Please allow 3 business days for your refill to be  "completed.          Additional Information About Your Visit        Care EveryWhere ID     This is your Care EveryWhere ID. This could be used by other organizations to access your [facility] medical records  [id number]        Your Vitals Were     Pulse Temperature Height Pulse Oximetry BMI (Body Mass Index)       62 97.8  F (36.6  C) (Oral) 5' 10\" (1.778 m) 96% 25.1 kg/m2        Blood Pressure from Last 3 Encounters:   08/03/18 128/67   07/30/18 110/47   07/23/18 (!) 90/30    Weight from Last 3 Encounters:   08/03/18 174 lb 14.4 oz (79.3 kg)   07/23/18 135 lb 2.3 oz (61.3 kg)   07/23/18 174 lb (78.9 kg)              We Performed the Following     PAF COMPLETED          Where to get your medicines      These medications were sent to [facility]  [address] [address]     Phone:  [phone]     ferrous sulfate 325 (65 Fe) MG tablet    pantoprazole 40 MG EC tablet          Primary Care Provider Office Phone # Fax #    Karson Bishop [phone] [address] [address]        Equal Access to Services     [facility] : Hadii aad ku hadasho Soomaali, waaxda luqadaha, qaybta kaalmada adeegyada, waxay idiin haytedn trae salazar . So [facility] [phone].    ATENCIÓN: Si habla español, tiene a randle disposición servicios gratuitos de asistencia lingüística. Swati al [phone].    We comply with applicable federal civil rights laws and [place] laws. We do not discriminate on the basis of race, color, national origin, age, disability, sex, sexual orientation, or gender identity.            Thank you!     Thank you for choosing [facility]  for your care. Our goal is always to provide you with excellent care. Hearing back from our patients is one way we can continue to improve our services. Please take a few minutes to complete the written survey that you may receive in the mail after " your visit with us. Thank you!             Your Updated Medication List - Protect others around you: Learn how to safely use, store and throw away your medicines at www.disposemymeds.org.          This list is accurate as of 8/3/18  6:13 PM.  Always use your most recent med list.                   Brand Name Dispense Instructions for use Diagnosis    acetaminophen 325 MG tablet    TYLENOL    100 tablet    Take 2 tablets (650 mg) by mouth every 4 hours as needed for mild pain    Right knee pain, unspecified chronicity       albuterol 108 (90 Base) MCG/ACT Inhaler    PROAIR HFA/PROVENTIL HFA/VENTOLIN HFA    3 Inhaler    Inhale 1-2 puffs into the lungs every 6 hours as needed for shortness of breath / dyspnea or wheezing    Chronic obstructive pulmonary disease, unspecified COPD type (H)       atorvastatin 10 MG tablet    LIPITOR    90 tablet    Take 1 tablet (10 mg) by mouth daily    Mixed hyperlipidemia       camphor-menthol 0.5-0.5 % Lotn    DERMASARRA    444 mL    Apply a thin layer to itchy areas as often as desired    Pruritus       ferrous sulfate 325 (65 Fe) MG tablet    IRON    180 tablet    Take 1 tablet (325 mg) by mouth 2 times daily    Iron deficiency anemia, unspecified iron deficiency anemia type       hydrOXYzine 25 MG tablet    ATARAX    120 tablet    Take 1 to 2 tablets (25-50 mg) by mouth every 6 hours as needed for itching    Chronic pruritus       loratadine 10 MG tablet    CLARITIN     Take 10 mg by mouth daily        metoprolol succinate 100 MG 24 hr tablet    TOPROL-XL    90 tablet    Take 0.5 tablets (50 mg) by mouth daily    Benign essential hypertension       pantoprazole 40 MG EC tablet    PROTONIX    90 tablet    Take 1 tablet (40 mg) by mouth every morning (before breakfast)    Iron deficiency anemia, unspecified iron deficiency anemia type       predniSONE 10 MG tablet    DELTASONE    15 tablet    Take 20mg (2 tabs) po daily for 5 days, then decrease to 10mg (1 tab) po daily for 5 days,  then stop.    Right knee pain, unspecified chronicity       tiotropium 18 MCG capsule    SPIRIVA HANDIHALER    90 capsule    Inhale 1 capsule (18 mcg) into the lungs daily    Chronic obstructive pulmonary disease, unspecified COPD type (H)       triamcinolone 0.1 % cream    KENALOG     Apply topically 2 times daily as needed for irritation        WARFARIN SODIUM PO      Take 5 mg by mouth daily

## 2018-08-03 NOTE — PROGRESS NOTES
SUBJECTIVE:   Hermilo Velasquez is a 85 year old male who presents to clinic today for the following health issues:          Hospital Follow-up Visit:    Hospital/Nursing Home/IP Rehab Facility: [facility]  Date of Admission: 7-  Date of Discharge: 7-  Reason(s) for Admission: Relative hypotension and bradycardia: Improved            Problems taking medications regularly:  None       Medication changes since discharge: None       Problems adhering to non-medication therapy:  None    Summary of hospitalization:  [facility] discharge summary reviewed  Diagnostic Tests/Treatments reviewed.  Follow up needed: none  Other Healthcare Providers Involved in Patient s Care:         None  Update since discharge: improved.     Post Discharge Medication Reconciliation: discharge medications reconciled, continue medications without change.  Plan of care communicated with patient     Coding guidelines for this visit:  Type of Medical   Decision Making Face-to-Face Visit       within 7 Days of discharge Face-to-Face Visit        within 14 days of discharge   Moderate Complexity [id number]   High Complexity [phone] [id number]              Pleasant 85-year-old man with multiple medical problems including lymphoma, atrial fibrillation, history of pulmonary embolism, gastroesophageal reflux disease, hyperlipidemia, peripheral neuropathy, monoclonal gammopathy, osteoarthritis.  He lives in a private home with his wife.  He is a non-smoker.  He was hospitalized for hypotension and bradycardia.  He was found to have significant anemia.  He underwent an upper endoscopy and colonoscopy and was found to have angiectasia without evidence of recent bleeding but this was thought to be his blood loss source.  No other dangerous lesions were identified.  He received intravenous iron and was started on oral iron.  It was recommended that a tagged red blood cell scan be obtained if he has a hemoglobin drop in the  future.  No subsequent colonoscopies were recommended for this patient.  Since hospital discharge, he has been feeling well.  He was restarted on a proton pump inhibitor, but thankfully, the Protonix is not exacerbating his skin condition.  He was also started on prednisone for swelling and stiffness in his right knee that has now resolved.    Problem list and histories reviewed & adjusted, as indicated.  Additional history: as documented    Patient Active Problem List   Diagnosis     Low back pain     Ventral hernia     Nonrheumatic aortic valve stenosis     GERD (gastroesophageal reflux disease)     Non Hodgkin's lymphoma (H): 2013     Microscopic hematuria     [facility]     CARDIOVASCULAR SCREENING; LDL GOAL LESS THAN 130     History of colonic polyps     Neuropathy     Anemia due to blood loss, acute     Physical deconditioning     Paroxysmal atrial fibrillation (H)     Need for SBE (subacute bacterial endocarditis) prophylaxis     RBBB (right bundle branch block)     Gastrointestinal hemorrhage with melena     Primary osteoarthritis of both knees     Primary osteoarthritis of left hip     Pulmonary embolism, bilateral (H)     S/P IVC filter     Long-term (current) use of anticoagulants [Z79.01]     Benign neoplasm of colon, unspecified part of colon     Pulmonary nodules     Benign neoplasm of colon     Primary osteoarthritis of left knee     Essential hypertension with goal blood pressure less than 140/90     Non-Hodgkin's lymphoma, unspecified body region, unspecified non-Hodgkin lymphoma type (H)     Anticoagulated on Coumadin     Other chronic pulmonary embolism without acute cor pulmonale (H)     Epistaxis     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     Aftercare following left knee joint replacement surgery     Rash     Drainage from wound: left Knee     Lower extremity edema     Leg edema, left     S/P total knee arthroplasty     ACP (advance care planning)     Hyperlipidemia LDL goal <130      Chronic obstructive pulmonary disease, unspecified COPD type (H)     Anemia     Past Surgical History:   Procedure Laterality Date     APPENDECTOMY       AS TOTAL KNEE ARTHROPLASTY       BACK SURGERY       ESOPHAGOSCOPY, GASTROSCOPY, DUODENOSCOPY (EGD), COMBINED N/A 11/28/2015    Procedure: COMBINED ESOPHAGOSCOPY, GASTROSCOPY, DUODENOSCOPY (EGD);  Surgeon: Danis Castillo MD;  Location:  GI     ESOPHAGOSCOPY, GASTROSCOPY, DUODENOSCOPY (EGD), COMBINED N/A 7/26/2018    Procedure: COMBINED ESOPHAGOSCOPY, GASTROSCOPY, DUODENOSCOPY (EGD);;  Surgeon: Toby Dong DO;  Location:  GI     HERNIA REPAIR  2006     HERNIORRHAPHY VENTRAL  4/17/2013    Procedure: HERNIORRHAPHY VENTRAL;  VENTRAL HERNIA REPAIR WITH MESH;  Surgeon: Patel Guzman MD;  Location:  OR     KNEE SURGERY      arthroscopic right knee surgery      REPAIR LIGAMENT ANKLE  2/23/2012    Procedure:REPAIR LIGAMENT ANKLE; LEFT TARSAL TUNNEL RELEASE OF KNOT OF ARIEL RELEASE; Surgeon:SAUL PUENTE; Location: OR     REPLACE VALVE AORTIC N/A 9/3/2015    Procedure: REPLACE VALVE AORTIC;  Surgeon: Antonino Mitchell MD;  Location:  OR     ROTATOR CUFF REPAIR RT/LT      bilateral     SPINE SURGERY      3 spine surgeries     TONSILLECTOMY       TURP         Social History   Substance Use Topics     Smoking status: Former Smoker     Packs/day: 2.00     Years: 55.00     Quit date: 1/22/1998     Smokeless tobacco: Never Used     Alcohol use 0.0 oz/week     0 Standard drinks or equivalent per week      Comment: 1 drink per day     Family History   Problem Relation Age of Onset     C.A.D. Father      Emphysema Father      Melanoma No family hx of      Skin Cancer No family hx of          Current Outpatient Prescriptions   Medication Sig Dispense Refill     acetaminophen (TYLENOL) 325 MG tablet Take 2 tablets (650 mg) by mouth every 4 hours as needed for mild pain 100 tablet      albuterol (PROAIR HFA/PROVENTIL HFA/VENTOLIN  "HFA) 108 (90 Base) MCG/ACT Inhaler Inhale 1-2 puffs into the lungs every 6 hours as needed for shortness of breath / dyspnea or wheezing 3 Inhaler 5     atorvastatin (LIPITOR) 10 MG tablet Take 1 tablet (10 mg) by mouth daily 90 tablet 3     camphor-menthol (DERMASARRA) 0.5-0.5 % LOTN Apply a thin layer to itchy areas as often as desired 444 mL 11     ferrous sulfate (IRON) 325 (65 Fe) MG tablet Take 1 tablet (325 mg) by mouth 2 times daily 180 tablet 3     hydrOXYzine (ATARAX) 25 MG tablet Take 1 to 2 tablets (25-50 mg) by mouth every 6 hours as needed for itching 120 tablet 1     loratadine (CLARITIN) 10 MG tablet Take 10 mg by mouth daily       metoprolol succinate (TOPROL-XL) 100 MG 24 hr tablet Take 0.5 tablets (50 mg) by mouth daily 90 tablet 3     pantoprazole (PROTONIX) 40 MG EC tablet Take 1 tablet (40 mg) by mouth every morning (before breakfast) 90 tablet 3     predniSONE (DELTASONE) 10 MG tablet Take 20mg (2 tabs) po daily for 5 days, then decrease to 10mg (1 tab) po daily for 5 days, then stop. 15 tablet 0     tiotropium (SPIRIVA HANDIHALER) 18 MCG capsule Inhale 1 capsule (18 mcg) into the lungs daily 90 capsule 3     triamcinolone (KENALOG) 0.1 % cream Apply topically 2 times daily as needed for irritation       WARFARIN SODIUM PO Take 5 mg by mouth daily       Allergies   Allergen Reactions     Gabapentin      Swelling       Lidocaine Blisters     Allergy to lidocaine ointment     Lasix [Furosemide] Rash     Penicillins Rash     \"broke out from injection\" 60 yrs ago         Reviewed and updated as needed this visit by clinical staff       Reviewed and updated as needed this visit by Provider         ROS:  Constitutional, HEENT, cardiovascular, pulmonary, gi and gu systems are negative, except as otherwise noted.    OBJECTIVE:     /67 (BP Location: Right arm, Cuff Size: Adult Regular)  Pulse 62  Temp 97.8  F (36.6  C) (Oral)  Ht 5' 10\" (1.778 m)  Wt 174 lb 14.4 oz (79.3 kg)  SpO2 96%  BMI " 25.1 kg/m2  Body mass index is 25.1 kg/(m^2).  GENERAL: healthy, alert and no distress  NECK: no adenopathy, no asymmetry, masses, or scars and thyroid normal to palpation  RESP: lungs clear to auscultation - no rales, rhonchi or wheezes  CV: Heart with regular rate and rhythm.   ABDOMEN: soft, nontender, no hepatosplenomegaly, no masses and bowel sounds normal  MS: There is no right knee effusion, the right knee joint has full range of motion, there is moderate joint line tenderness in the right knee.  NEURO: Normal strength and tone, mentation intact and speech normal  PSYCH: mentation appears normal, affect normal/bright    Diagnostic Test Results:  Results for orders placed or performed in visit on 07/31/18   CBC with platelets differential   Result Value Ref Range    WBC 11.6 (H) 4.0 - 11.0 10e9/L    RBC Count 3.77 (L) 4.4 - 5.9 10e12/L    Hemoglobin 7.5 (L) 13.3 - 17.7 g/dL    Hematocrit 25.3 (L) 40.0 - 53.0 %    MCV 67 (L) 78 - 100 fl    MCH 19.9 (L) 26.5 - 33.0 pg    MCHC 29.6 (L) 31.5 - 36.5 g/dL    RDW 19.9 (H) 10.0 - 15.0 %    Platelet Count 280 150 - 450 10e9/L    Diff Method Automated Method     % Neutrophils 79.6 %    % Lymphocytes 8.4 %    % Monocytes 10.8 %    % Eosinophils 1.0 %    % Basophils 0.2 %    Absolute Neutrophil 9.2 (H) 1.6 - 8.3 10e9/L    Absolute Lymphocytes 1.0 0.8 - 5.3 10e9/L    Absolute Monocytes 1.3 0.0 - 1.3 10e9/L    Absolute Eosinophils 0.1 0.0 - 0.7 10e9/L    Absolute Basophils 0.0 0.0 - 0.2 10e9/L   TSH with free T4 reflex   Result Value Ref Range    TSH 2.88 0.40 - 4.00 mU/L   Hemoglobin A1c   Result Value Ref Range    Hemoglobin A1C 5.8 (H) 0 - 5.6 %   Hepatic panel   Result Value Ref Range    Bilirubin Direct 0.1 0.0 - 0.2 mg/dL    Bilirubin Total 0.3 0.2 - 1.3 mg/dL    Albumin 2.6 (L) 3.4 - 5.0 g/dL    Protein Total 6.3 (L) 6.8 - 8.8 g/dL    Alkaline Phosphatase 65 40 - 150 U/L    ALT 14 0 - 70 U/L    AST 11 0 - 45 U/L   INR   Result Value Ref Range    INR 1.90 (H) 0.86 -  1.14       ASSESSMENT/PLAN:               ICD-10-CM    1. Iron deficiency anemia, unspecified iron deficiency anemia type D50.9 pantoprazole (PROTONIX) 40 MG EC tablet     ferrous sulfate (IRON) 325 (65 Fe) MG tablet   2. Paroxysmal atrial fibrillation (H) I48.0    3. Pulmonary embolism, bilateral (H) I26.99    4. Gastroesophageal reflux disease without esophagitis K21.9    5. Chronic pain of right knee M25.561     G89.29        Pleasant man who was observed to have severe iron deficiency anemia probably from slow gastrointestinal blood loss from angiectasia.  He does have a strong indication for anticoagulation given his history of atrial fibrillation and pulmonary emboli.  As such, he will continue on Coumadin but start chronic oral iron replacement.  Again, he received IV iron replacement as well.  He did have a hemoglobin check in his dermatologist office after discharge which demonstrated a hemoglobin that was trending upward.  I told him to return in about 4-6 weeks to recheck the hemoglobin to ensure that it continues to trend upward.  We will check his iron stores at that time to to make sure that he would not benefit from additional intravenous iron if the hemoglobin is not coming up as expected.  With respect to his right knee pain, I think that this is from right knee osteoarthritis.  He did receive some temporary symptom relief with a cortisone injection in March.  I told him that if his knee is still bothering him when he returns in a month, we should do an injection at that time as well.  If he begins to see blood in his stool or dark tarry sticky stool between then and now, he will inform me.  Also, if the dizziness or shortness of breath reoccurs, he will inform me before he sees me in 4 weeks as well.        Karson Bishop MD  [facility]

## 2018-08-03 NOTE — PROGRESS NOTES
"  ANTICOAGULATION FOLLOW-UP CLINIC VISIT    Patient Name:  Hermilo Velasquez  Date:  8/3/2018  Contact Type:  Face to Face    SUBJECTIVE:        OBJECTIVE    INR Protime   Date Value Ref Range Status   08/03/2018 2.4 (A) 0.86 - 1.14 Final       ASSESSMENT / PLAN  INR assessment THER    Recheck INR In: 2 WEEKS    INR Location Clinic      Anticoagulation Summary as of 8/3/2018     INR goal 2.0-3.0   Today's INR 2.4   Warfarin maintenance plan 5 mg (5 mg x 1) every day   Full warfarin instructions 5 mg every day   Weekly warfarin total 35 mg   Weekly max warfarin dose 45 mg   Plan last modified Janneth Allen RN (8/22/2017)   Next INR check 8/22/2018   Target end date Indefinite    Indications   Long-term (current) use of anticoagulants [Z79.01] [Z79.01]  Pulmonary embolism  bilateral (H) [I26.99]  Paroxysmal atrial fibrillation (H) [I48.0]         Anticoagulation Episode Summary     INR check location [facility]    Preferred lab     Send INR reminders to [facility] ANTICOAGULATION    Comments       Anticoagulation Care Providers     Provider Role Specialty Phone number    Karson Bishop MD [facility] Internal Medicine [phone]            See the Encounter Report to view Anticoagulation Flowsheet and Dosing Calendar (Go to Encounters tab in chart review, and find the Anticoagulation Therapy Visit)    Dosage adjustment made based on physician directed care plan. INR 2.4 today after recent hospitalization. Saw PCP and states \"everything is back on track\".  He is leaving for [facility] tomorrow.  Requests INR recheck when back in [facility]. Scheduled for 8/22/18.    Analisa Vigil RN               "

## 2018-08-21 DIAGNOSIS — I48.0 PAROXYSMAL ATRIAL FIBRILLATION (H): ICD-10-CM

## 2018-08-22 ENCOUNTER — ANTICOAGULATION THERAPY VISIT (OUTPATIENT)
Dept: NURSING | Facility: CLINIC | Age: 83
End: 2018-08-22
Payer: COMMERCIAL

## 2018-08-22 DIAGNOSIS — I26.99 PULMONARY EMBOLISM, BILATERAL (H): ICD-10-CM

## 2018-08-22 DIAGNOSIS — L29.9 CHRONIC PRURITUS: ICD-10-CM

## 2018-08-22 DIAGNOSIS — Z79.01 LONG-TERM (CURRENT) USE OF ANTICOAGULANTS: ICD-10-CM

## 2018-08-22 DIAGNOSIS — I48.0 PAROXYSMAL ATRIAL FIBRILLATION (H): ICD-10-CM

## 2018-08-22 LAB — INR POINT OF CARE: 5.3 (ref 0.86–1.14)

## 2018-08-22 PROCEDURE — 85610 PROTHROMBIN TIME: CPT | Mod: QW

## 2018-08-22 PROCEDURE — 99207 ZZC NO CHARGE NURSE ONLY: CPT

## 2018-08-22 PROCEDURE — 36416 COLLJ CAPILLARY BLOOD SPEC: CPT

## 2018-08-22 RX ORDER — WARFARIN SODIUM 5 MG/1
5 TABLET ORAL DAILY
Qty: 90 TABLET | Refills: 1 | Status: SHIPPED | OUTPATIENT
Start: 2018-08-22 | End: 2019-02-14

## 2018-08-22 RX ORDER — HYDROXYZINE HYDROCHLORIDE 25 MG/1
TABLET, FILM COATED ORAL
Qty: 120 TABLET | Refills: 1 | Status: SHIPPED | OUTPATIENT
Start: 2018-08-22 | End: 2018-12-31

## 2018-08-22 NOTE — MR AVS SNAPSHOT
Hermilo Velasquez   8/22/2018 2:45 PM   Anticoagulation Therapy Visit    Description:  85 year old male   Provider:   ANTICOAGULATION CLINIC   Department:   Nurse           INR as of 8/22/2018     Today's INR 5.3!      Anticoagulation Summary as of 8/22/2018     INR goal 2.0-3.0   Today's INR 5.3!   Full warfarin instructions 8/22: Hold; 8/23: 2.5 mg; Otherwise 5 mg every day   Next INR check 8/24/2018    Indications   Long-term (current) use of anticoagulants [Z79.01] [Z79.01]  Pulmonary embolism  bilateral (H) [I26.99]  Paroxysmal atrial fibrillation (H) [I48.0]         Your next Anticoagulation Clinic appointment(s)     Aug 24, 2018 11:30 AM CDT   Anticoagulation Visit with  ANTICOAGULATION CLINIC   [facility]    [address]  [place] [address]   [phone]              Contact Numbers     Clinic Number:         August 2018 Details    Sun Mon Tue Wed Thu Fri Sat        1               2               3               4                 5               6               7               8               9               10               11                 12               13               14               15               16               17               18                 19               20               21               22      Hold   See details      23      2.5 mg         24            25                 26               27               28               29               30               31                 Date Details   08/22 This INR check       Date of next INR:  8/24/2018         How to take your warfarin dose     To take:  2.5 mg Take 0.5 of a 5 mg tablet.    To take:  5 mg Take 1 of the 5 mg tablets.    Hold Do not take your warfarin dose. See the Details table to the right for additional instructions.

## 2018-08-22 NOTE — PROGRESS NOTES
ANTICOAGULATION FOLLOW-UP CLINIC VISIT    Patient Name:  Hermilo Velasquez  Date:  8/22/2018  Contact Type:  Face to Face    SUBJECTIVE:     Patient Findings     Positives Bruising (Multiple bruises on hands/arms. Right hand worse than left. Denies any other signs of bleeding)           OBJECTIVE    INR Protime   Date Value Ref Range Status   08/22/2018 5.3 (A) 0.86 - 1.14 Final       ASSESSMENT / PLAN  INR assessment SUPRA    Recheck INR In: 2 DAYS    INR Location Clinic      Anticoagulation Summary as of 8/22/2018     INR goal 2.0-3.0   Today's INR 5.3!   Warfarin maintenance plan 5 mg (5 mg x 1) every day   Full warfarin instructions 8/22: Hold; 8/23: 2.5 mg; Otherwise 5 mg every day   Weekly warfarin total 35 mg   Weekly max warfarin dose 45 mg   Plan last modified Janneth Allen RN (8/22/2017)   Next INR check 8/24/2018   Target end date Indefinite    Indications   Long-term (current) use of anticoagulants [Z79.01] [Z79.01]  Pulmonary embolism  bilateral (H) [I26.99]  Paroxysmal atrial fibrillation (H) [I48.0]         Anticoagulation Episode Summary     INR check location [facility]    Preferred lab     Send INR reminders to [facility] ANTICOAG[facility]    Comments       Anticoagulation Care Providers     Provider Role Specialty Phone number    Karson Bishop MD [facility] Internal Medicine [phone]            See the Encounter Report to view Anticoagulation Flowsheet and Dosing Calendar (Go to Encounters tab in chart review, and find the Anticoagulation Therapy Visit)    Dosage adjustment made based on physician directed care plan.    Lin Hood RN

## 2018-08-22 NOTE — TELEPHONE ENCOUNTER
"WARFARIN SODIUM PO     --      Sig - Route: Take 5 mg by mouth daily      Last Written Prescription Date:  uncertain  Last Fill Quantity: ?,  # refills: ?   Last office visit: 8/3/2018 with prescribing provider:  Edna   Future Office Visit:   Next 5 appointments (look out 90 days)     Sep 07, 2018  1:30 PM CDT   Office Visit with Karson Bishop MD   [facility]    [address] [facility] [id number] [phone]                 Requested Prescriptions   Pending Prescriptions Disp Refills     warfarin (COUMADIN) 5 MG tablet [Pharmacy Med Name: Warfarin Sodium Oral Tablet 5 MG] 140 tablet 0     Sig: TAKE 1 TO 1.5 TABLETS (5 TO 7.5 MG) BY MOUTH DAILY. TAKE AS PRESCRIBED BY INR CLINIC. CURRENT DOSE IS 7.5 MG FOR 2 DAYS AND 5 MG FOR 5 DAYS.    Vitamin K Antagonists Failed    8/21/2018  5:10 PM       Failed - INR is within goal in the past 6 weeks    Confirm INR is within goal in the past 6 weeks.     Recent Labs   Lab Test 08/03/18   INR  2.4*                      Passed - Recent (12 mo) or future (30 days) visit within the authorizing provider's specialty    Patient had office visit in the last 12 months or has a visit in the next 30 days with authorizing provider or within the authorizing provider's specialty.  See \"Patient Info\" tab in inbasket, or \"Choose Columns\" in Meds & Orders section of the refill encounter.           Passed - Patient is 18 years of age or older        No flowsheet data found.  "

## 2018-08-23 RX ORDER — WARFARIN SODIUM 5 MG/1
5 TABLET ORAL DAILY
Qty: 90 TABLET | Refills: 0 | Status: ON HOLD | OUTPATIENT
Start: 2018-08-23 | End: 2019-03-26

## 2018-08-23 NOTE — TELEPHONE ENCOUNTER
Prescription approved per [facility] Refill Protocol.  Yamilex EVERETT RN

## 2018-08-24 ENCOUNTER — ANTICOAGULATION THERAPY VISIT (OUTPATIENT)
Dept: NURSING | Facility: CLINIC | Age: 83
End: 2018-08-24
Payer: COMMERCIAL

## 2018-08-24 DIAGNOSIS — Z79.01 LONG-TERM (CURRENT) USE OF ANTICOAGULANTS: ICD-10-CM

## 2018-08-24 DIAGNOSIS — I26.99 PULMONARY EMBOLISM, BILATERAL (H): ICD-10-CM

## 2018-08-24 DIAGNOSIS — I48.0 PAROXYSMAL ATRIAL FIBRILLATION (H): ICD-10-CM

## 2018-08-24 LAB — INR POINT OF CARE: 2.4 (ref 0.86–1.14)

## 2018-08-24 PROCEDURE — 36416 COLLJ CAPILLARY BLOOD SPEC: CPT

## 2018-08-24 PROCEDURE — 85610 PROTHROMBIN TIME: CPT | Mod: QW

## 2018-08-24 NOTE — PROGRESS NOTES
ANTICOAGULATION FOLLOW-UP CLINIC VISIT    Patient Name:  Hermilo Velasquez  Date:  8/24/2018  Contact Type:  Face to Face    SUBJECTIVE:     Patient Findings     Positives Unexplained INR or factor level change           OBJECTIVE    INR Protime   Date Value Ref Range Status   08/24/2018 2.4 (A) 0.86 - 1.14 Final       ASSESSMENT / PLAN  INR assessment THER    Recheck INR In: 1 WEEK    INR Location Clinic      Anticoagulation Summary as of 8/24/2018     INR goal 2.0-3.0   Today's INR 2.4   Warfarin maintenance plan 5 mg (5 mg x 1) every day   Full warfarin instructions 8/26: 2.5 mg; 8/29: 2.5 mg; Otherwise 5 mg every day   Weekly warfarin total 35 mg   Weekly max warfarin dose 45 mg   Plan last modified Janneth Allen RN (8/22/2017)   Next INR check 8/30/2018   Target end date Indefinite    Indications   Long-term (current) use of anticoagulants [Z79.01] [Z79.01]  Pulmonary embolism  bilateral (H) [I26.99]  Paroxysmal atrial fibrillation (H) [I48.0]         Anticoagulation Episode Summary     INR check location [facility]    Preferred lab     Send INR reminders to [facility] ANTICOAGULATION    Comments       Anticoagulation Care Providers     Provider Role Specialty Phone number    Karson Bishop MD Responsible Internal Medicine [phone]            See the Encounter Report to view Anticoagulation Flowsheet and Dosing Calendar (Go to Encounters tab in chart review, and find the Anticoagulation Therapy Visit)    Dosage adjustment made based on physician directed care plan.  Unexplained INR change Wed, 7.5 mg held, then pt back within theraputic range.  Gave pt 5 mg more held spread over next week, and recheck Thursday.    Dione Giron RN

## 2018-08-24 NOTE — MR AVS SNAPSHOT
Hermilo MADRID Eva   8/24/2018 11:30 AM   Anticoagulation Therapy Visit    Description:  85 year old male   Provider:   ANTICOAGULATION CLINIC   Department:   Nurse           INR as of 8/24/2018     Today's INR 2.4      Anticoagulation Summary as of 8/24/2018     INR goal 2.0-3.0   Today's INR 2.4   Full warfarin instructions 8/26: 2.5 mg; 8/29: 2.5 mg; Otherwise 5 mg every day   Next INR check 8/30/2018    Indications   Long-term (current) use of anticoagulants [Z79.01] [Z79.01]  Pulmonary embolism  bilateral (H) [I26.99]  Paroxysmal atrial fibrillation (H) [I48.0]         Your next Anticoagulation Clinic appointment(s)     Aug 30, 2018 10:15 AM CDT   Anticoagulation Visit with  ANTICOAGULATION CLINIC   [facility]    [address]  [place] [address]   [phone]              Contact Numbers     Clinic Number:         August 2018 Details    Sun Mon Tue Wed Thu Fri Sat        1               2               3               4                 5               6               7               8               9               10               11                 12               13               14               15               16               17               18                 19               20               21               22               23               24      5 mg   See details      25      5 mg           26      2.5 mg         27      5 mg         28      5 mg         29      2.5 mg         30            31                 Date Details   08/24 This INR check       Date of next INR:  8/30/2018         How to take your warfarin dose     To take:  2.5 mg Take 0.5 of a 5 mg tablet.    To take:  5 mg Take 1 of the 5 mg tablets.

## 2018-08-28 ENCOUNTER — TELEPHONE (OUTPATIENT)
Dept: FAMILY MEDICINE | Facility: CLINIC | Age: 83
End: 2018-08-28

## 2018-08-28 DIAGNOSIS — D50.9 IRON DEFICIENCY ANEMIA, UNSPECIFIED IRON DEFICIENCY ANEMIA TYPE: ICD-10-CM

## 2018-08-28 RX ORDER — FERROUS SULFATE 325(65) MG
325 TABLET ORAL 2 TIMES DAILY
Qty: 180 TABLET | Refills: 3 | Status: SHIPPED | OUTPATIENT
Start: 2018-08-28 | End: 2019-03-20

## 2018-08-28 NOTE — TELEPHONE ENCOUNTER
Reason for Call:  Medication questin    Do you use a [facility]?  Name of the pharmacy and phone number for the current request:         [facility]  [facility] [place], [place] - [address] [place] [address].    Name of the medication requested: ferrous sulfate (IRON) 325 (65 Fe) MG tablet    Other request: patient is wondering If Dr. Bishop wants him to continue taking the iron pills?    If yes, he does need a refill.  He only has 2 pills left.    Please call to advise    Can we leave a detailed message on this number? YES    Phone number patient can be reached at: Home number on file [phone] (home)    Best Time: anytime    Call taken on 8/28/2018 at 11:33 AM by Karen Moraes  .

## 2018-08-28 NOTE — TELEPHONE ENCOUNTER
Rx for iron called in to [facility] as requested. [facility] notified and instructed to cancel the script for iron which was sent electronically.

## 2018-08-30 ENCOUNTER — ANTICOAGULATION THERAPY VISIT (OUTPATIENT)
Dept: NURSING | Facility: CLINIC | Age: 83
End: 2018-08-30
Payer: COMMERCIAL

## 2018-08-30 ENCOUNTER — OFFICE VISIT (OUTPATIENT)
Dept: FAMILY MEDICINE | Facility: CLINIC | Age: 83
End: 2018-08-30
Payer: COMMERCIAL

## 2018-08-30 VITALS
SYSTOLIC BLOOD PRESSURE: 112 MMHG | OXYGEN SATURATION: 96 % | BODY MASS INDEX: 24.62 KG/M2 | HEIGHT: 70 IN | DIASTOLIC BLOOD PRESSURE: 58 MMHG | HEART RATE: 55 BPM | WEIGHT: 172 LBS | TEMPERATURE: 96.6 F

## 2018-08-30 DIAGNOSIS — D50.0 IRON DEFICIENCY ANEMIA DUE TO CHRONIC BLOOD LOSS: Primary | ICD-10-CM

## 2018-08-30 DIAGNOSIS — I26.99 PULMONARY EMBOLISM, BILATERAL (H): ICD-10-CM

## 2018-08-30 DIAGNOSIS — I48.0 PAROXYSMAL ATRIAL FIBRILLATION (H): ICD-10-CM

## 2018-08-30 DIAGNOSIS — M17.11 PRIMARY OSTEOARTHRITIS OF RIGHT KNEE: ICD-10-CM

## 2018-08-30 DIAGNOSIS — Z79.01 LONG-TERM (CURRENT) USE OF ANTICOAGULANTS: ICD-10-CM

## 2018-08-30 DIAGNOSIS — K21.9 GASTROESOPHAGEAL REFLUX DISEASE WITHOUT ESOPHAGITIS: ICD-10-CM

## 2018-08-30 PROBLEM — D64.9 ANEMIA: Status: RESOLVED | Noted: 2018-07-23 | Resolved: 2018-08-30

## 2018-08-30 LAB
ERYTHROCYTE [DISTWIDTH] IN BLOOD BY AUTOMATED COUNT: ABNORMAL % (ref 10–15)
HCT VFR BLD AUTO: 35.5 % (ref 40–53)
HGB BLD-MCNC: 10.6 G/DL (ref 13.3–17.7)
INR POINT OF CARE: 1.2 (ref 0.86–1.14)
MCH RBC QN AUTO: 24.1 PG (ref 26.5–33)
MCHC RBC AUTO-ENTMCNC: 29.9 G/DL (ref 31.5–36.5)
MCV RBC AUTO: 81 FL (ref 78–100)
PLATELET # BLD AUTO: ABNORMAL 10E9/L (ref 150–450)
RBC # BLD AUTO: 4.4 10E12/L (ref 4.4–5.9)
WBC # BLD AUTO: 5.9 10E9/L (ref 4–11)

## 2018-08-30 PROCEDURE — 85610 PROTHROMBIN TIME: CPT | Mod: QW

## 2018-08-30 PROCEDURE — 83540 ASSAY OF IRON: CPT | Performed by: INTERNAL MEDICINE

## 2018-08-30 PROCEDURE — 99213 OFFICE O/P EST LOW 20 MIN: CPT | Mod: 25 | Performed by: INTERNAL MEDICINE

## 2018-08-30 PROCEDURE — 83550 IRON BINDING TEST: CPT | Performed by: INTERNAL MEDICINE

## 2018-08-30 PROCEDURE — 36416 COLLJ CAPILLARY BLOOD SPEC: CPT

## 2018-08-30 PROCEDURE — 99207 ZZC NO CHARGE NURSE ONLY: CPT

## 2018-08-30 PROCEDURE — 85027 COMPLETE CBC AUTOMATED: CPT | Performed by: INTERNAL MEDICINE

## 2018-08-30 PROCEDURE — 20610 DRAIN/INJ JOINT/BURSA W/O US: CPT | Performed by: INTERNAL MEDICINE

## 2018-08-30 NOTE — LETTER
[facility]  [address] [facility]  [address]  [place]  [address]  Tel: [phone]    September 4, 2018    Hermilo M Eva  [address] [facility] [address]        Dear Brennan Tatekevin,    The following letter pertains to your most recent diagnostic tests:    Good news! Your iron tests are normal.  You hemoglobin is improving as we would expect.  The combination of leaky blood vessels in your stomach and intestines and the coumadin causes you to have low iron levels and anemia if you don't take an iron supplement.  Therefore, I would recommend that you continue taking your iron supplement as you are.        Sincerely,    Dr. Bishop / ignacio     Results for orders placed or performed in visit on 08/30/18   Iron and iron binding capacity   Result Value Ref Range    Iron 86 35 - 180 ug/dL    Iron Binding Cap 313 240 - 430 ug/dL    Iron Saturation Index 27 15 - 46 %   CBC with platelets   Result Value Ref Range    WBC 5.9 4.0 - 11.0 10e9/L    RBC Count 4.40 4.4 - 5.9 10e12/L    Hemoglobin 10.6 (L) 13.3 - 17.7 g/dL    Hematocrit 35.5 (L) 40.0 - 53.0 %    MCV 81 78 - 100 fl    MCH 24.1 (L) 26.5 - 33.0 pg    MCHC 29.9 (L) 31.5 - 36.5 g/dL    RDW Not Calculated 10.0 - 15.0 %    Platelet Count Verified by smear review 150 - 450 10e9/L

## 2018-08-30 NOTE — PROGRESS NOTES
SUBJECTIVE:   Hermilo Velasquez is a 85 year old male who presents to clinic today for the following health issues:      Patient presents with:  RECHECK: iron and INR      Pleasant man recently hospitalized for iron deficiency anemia.  His dyspnea symptoms have been improving since he received IV iron infusions and is now on an oral iron supplement.  He is tolerating the oral iron supplement with minimal constipation that is remedied by over-the-counter stool softeners.  He complained of knee pain at his last visit.  His right knee pain remains severe and limiting.  He denies new knee swelling or locking or catching.  An x-ray of his right knee recently showed osteoarthritis changes.  Because he gets pruritus with proton pump inhibitors, he was switched to an H2 blocker, but still has heartburn symptoms on 150 mg of ranitidine twice daily.  He denies dysphagia, odynophagia or weight loss.      Problem list and histories reviewed & adjusted, as indicated.  Additional history: as documented    Patient Active Problem List   Diagnosis     Low back pain     Ventral hernia     Nonrheumatic aortic valve stenosis     GERD (gastroesophageal reflux disease)     Non Hodgkin's lymphoma (H): 2013     Microscopic hematuria     Health Care Home     CARDIOVASCULAR SCREENING; LDL GOAL LESS THAN 130     History of colonic polyps     Neuropathy     Anemia due to blood loss, acute     Physical deconditioning     Paroxysmal atrial fibrillation (H)     Need for SBE (subacute bacterial endocarditis) prophylaxis     RBBB (right bundle branch block)     Gastrointestinal hemorrhage with melena     Primary osteoarthritis of both knees     Primary osteoarthritis of left hip     Pulmonary embolism, bilateral (H)     S/P IVC filter     Long-term (current) use of anticoagulants [Z79.01]     Benign neoplasm of colon, unspecified part of colon     Pulmonary nodules     Benign neoplasm of colon     Primary osteoarthritis of left knee      Essential hypertension with goal blood pressure less than 140/90     Non-Hodgkin's lymphoma, unspecified body region, unspecified non-Hodgkin lymphoma type (H)     Anticoagulated on Coumadin     Other chronic pulmonary embolism without acute cor pulmonale (H)     Epistaxis     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     Aftercare following left knee joint replacement surgery     Rash     Drainage from wound: left Knee     Lower extremity edema     Leg edema, left     S/P total knee arthroplasty     ACP (advance care planning)     Hyperlipidemia LDL goal <130     Chronic obstructive pulmonary disease, unspecified COPD type (H)     Iron deficiency anemia due to chronic blood loss     Past Surgical History:   Procedure Laterality Date     APPENDECTOMY       AS TOTAL KNEE ARTHROPLASTY       BACK SURGERY       ESOPHAGOSCOPY, GASTROSCOPY, DUODENOSCOPY (EGD), COMBINED N/A 11/28/2015    Procedure: COMBINED ESOPHAGOSCOPY, GASTROSCOPY, DUODENOSCOPY (EGD);  Surgeon: Danis Castillo MD;  Location:  GI     ESOPHAGOSCOPY, GASTROSCOPY, DUODENOSCOPY (EGD), COMBINED N/A 7/26/2018    Procedure: COMBINED ESOPHAGOSCOPY, GASTROSCOPY, DUODENOSCOPY (EGD);;  Surgeon: Toby Dong DO;  Location:  GI     HERNIA REPAIR  2006     HERNIORRHAPHY VENTRAL  4/17/2013    Procedure: HERNIORRHAPHY VENTRAL;  VENTRAL HERNIA REPAIR WITH MESH;  Surgeon: Patel Guzman MD;  Location:  OR     KNEE SURGERY      arthroscopic right knee surgery      REPAIR LIGAMENT ANKLE  2/23/2012    Procedure:REPAIR LIGAMENT ANKLE; LEFT TARSAL TUNNEL RELEASE OF KNOT OF ARIEL RELEASE; Surgeon:SAUL PUENTE; Location: OR     REPLACE VALVE AORTIC N/A 9/3/2015    Procedure: REPLACE VALVE AORTIC;  Surgeon: Antonino Mitchell MD;  Location:  OR     ROTATOR CUFF REPAIR RT/LT      bilateral     SPINE SURGERY      3 spine surgeries     TONSILLECTOMY       TURP         Social History   Substance Use Topics     Smoking status:  Former Smoker     Packs/day: 2.00     Years: 55.00     Quit date: 1/22/1998     Smokeless tobacco: Never Used     Alcohol use 0.0 oz/week     0 Standard drinks or equivalent per week      Comment: 1 drink per day     Family History   Problem Relation Age of Onset     C.A.D. Father      Emphysema Father      Melanoma No family hx of      Skin Cancer No family hx of          Current Outpatient Prescriptions   Medication Sig Dispense Refill     acetaminophen (TYLENOL) 325 MG tablet Take 2 tablets (650 mg) by mouth every 4 hours as needed for mild pain 100 tablet      albuterol (PROAIR HFA/PROVENTIL HFA/VENTOLIN HFA) 108 (90 Base) MCG/ACT Inhaler Inhale 1-2 puffs into the lungs every 6 hours as needed for shortness of breath / dyspnea or wheezing 3 Inhaler 5     atorvastatin (LIPITOR) 10 MG tablet Take 1 tablet (10 mg) by mouth daily 90 tablet 3     camphor-menthol (DERMASARRA) 0.5-0.5 % LOTN Apply a thin layer to itchy areas as often as desired 444 mL 11     ferrous sulfate (IRON) 325 (65 Fe) MG tablet Take 1 tablet (325 mg) by mouth 2 times daily 180 tablet 3     hydrOXYzine (ATARAX) 25 MG tablet Take 1 to 2 tablets (25-50 mg) by mouth every 6 hours as needed for itching 120 tablet 1     loratadine (CLARITIN) 10 MG tablet Take 10 mg by mouth daily       metoprolol succinate (TOPROL-XL) 100 MG 24 hr tablet Take 0.5 tablets (50 mg) by mouth daily 90 tablet 3     ranitidine (ZANTAC) 300 MG tablet Take 1 tablet (300 mg) by mouth 2 times daily 180 tablet 3     tiotropium (SPIRIVA HANDIHALER) 18 MCG capsule Inhale 1 capsule (18 mcg) into the lungs daily 90 capsule 3     triamcinolone (KENALOG) 0.1 % cream Apply topically 2 times daily as needed for irritation       warfarin (COUMADIN) 5 MG tablet Take 1 tablet (5 mg) by mouth daily Or as directed by INR clinic 90 tablet 0     warfarin (COUMADIN) 5 MG tablet Take 1 tablet (5 mg) by mouth daily 90 tablet 1     WARFARIN SODIUM PO Take 5 mg by mouth daily       Allergies  "  Allergen Reactions     Gabapentin      Swelling       Lidocaine Blisters     Allergy to lidocaine ointment     Omeprazole Itching     Pantoprazole Itching     Prevacid [Lansoprazole] Itching     Lasix [Furosemide] Rash     Penicillins Rash     \"broke out from injection\" 60 yrs ago         Reviewed and updated as needed this visit by clinical staff       Reviewed and updated as needed this visit by Provider         ROS:  Constitutional, HEENT, cardiovascular, pulmonary, gi and gu systems are negative, except as otherwise noted.    OBJECTIVE:     /58 (BP Location: Left arm, Patient Position: Sitting, Cuff Size: Adult Regular)  Pulse 55  Temp 96.6  F (35.9  C) (Oral)  Ht 5' 10\" (1.778 m)  Wt 172 lb (78 kg)  SpO2 96%  BMI 24.68 kg/m2  Body mass index is 24.68 kg/(m^2).  General: This is a well-appearing man in no acute distress.  He appears improved from his previous visit.  Knee: There is no knee effusion, there is joint line tenderness laterally on the right knee, there is full knee range of motion and ligament and meniscus testing is intact.    Diagnostic Test Results:  CBC and iron studies are pending    ASSESSMENT/PLAN:       1. Iron deficiency anemia due to chronic blood loss  Recheck labs, consider additional IV iron if needed.  Continue oral iron supplement indefinitely.  - Iron and iron binding capacity  - CBC with platelets    2. Gastroesophageal reflux disease without esophagitis  Try increasing the dose of the H2 blocker  - ranitidine (ZANTAC) 300 MG tablet; Take 1 tablet (300 mg) by mouth 2 times daily  Dispense: 180 tablet; Refill: 3    3. Primary osteoarthritis of right knee    Right knee cortisone injection:  After discussion of risks and benefits of the procedure including bleeding and infection, verbal informed consent was obtained.  Area prepped and draped in sterile fashion.  Local anesthesia was obtained with 1% lidocaine.   The suprapatellar pouch was entered using a lateral approach. "  1cc of K40 was injected into the joint space.  The procedure was tolerated well and after care was discussed.      - Large Joint/Bursa injection and/or drainage (Shoulder, Knee)  - Kenalog 40 MG  []  - Lidocaine 1% or 2%     []    FUTURE APPOINTMENTS:       -Pending symptoms and labs    Karson Bishop MD  [facility]

## 2018-08-30 NOTE — MR AVS SNAPSHOT
"              After Visit Summary   8/30/2018    Hermilo Velasquez    MRN: [id number]           Patient Information     Date Of Birth          11/3/1932        Visit Information        Provider Department      8/30/2018 11:00 AM Karson Bishop MD [facility]        Today's Diagnoses     Iron deficiency anemia due to chronic blood loss    -  1    Gastroesophageal reflux disease without esophagitis        Primary osteoarthritis of right knee           Follow-ups after your visit        Your next 10 appointments already scheduled     Sep 12, 2018  1:30 PM CDT   Anticoagulation Visit with  ANTICOAGULATION CLINIC   [facility]    [address]  [place] [id number] [phone]              Who to contact     If you have questions or need follow up information about today's clinic visit or your schedule please contact [facility] directly at [phone].  Normal or non-critical lab and imaging results will be communicated to you by MyChart, letter or phone within 4 business days after the clinic has received the results. If you do not hear from us within 7 days, please contact the clinic through MyChart or phone. If you have a critical or abnormal lab result, we will notify you by phone as soon as possible.  Submit refill requests through [employer] or call your pharmacy and they will forward the refill request to us. Please allow 3 business days for your refill to be completed.          Additional Information About Your Visit        Care EveryWhere ID     This is your Care EveryWhere ID. This could be used by other organizations to access your [facility] medical records  [id number]        Your Vitals Were     Pulse Temperature Height Pulse Oximetry BMI (Body Mass Index)       55 96.6  F (35.9  C) (Oral) 5' 10\" (1.778 m) 96% 24.68 kg/m2        Blood Pressure from Last 3 Encounters:   08/30/18 112/58   08/03/18 128/67   07/30/18 110/47    Weight from Last 3 " Encounters:   08/30/18 172 lb (78 kg)   08/03/18 174 lb 14.4 oz (79.3 kg)   07/23/18 135 lb 2.3 oz (61.3 kg)              We Performed the Following     CBC with platelets     Iron and iron binding capacity     Kenalog 40 MG  []     Large Joint/Bursa injection and/or drainage (Shoulder, Knee)     Lidocaine 1% or 2%     []          Today's Medication Changes          These changes are accurate as of 8/30/18 12:43 PM.  If you have any questions, ask your nurse or doctor.               These medicines have changed or have updated prescriptions.        Dose/Directions    ranitidine 300 MG tablet   Commonly known as:  ZANTAC   This may have changed:    - medication strength  - how much to take   Used for:  Gastroesophageal reflux disease without esophagitis   Changed by:  Karson Bishop MD        Dose:  300 mg   Take 1 tablet (300 mg) by mouth 2 times daily   Quantity:  180 tablet   Refills:  3            Where to get your medicines      [id number] [address] [facility]  [address] [address]     Phone:  [phone]     ranitidine 300 MG tablet                Primary Care Provider Office Phone # Fax #    Karson Bishop [phone] [address] [address]        Equal Access to Services     [facility] Am[facility] HospitalAUBRIE : Hadii aad ku hadasho Soomaali, waaxda luqadaha, qaybta kaalmada adeegyada, waxay aubrey hayisha salazar . So [facility] [phone].    ATENCIÓN: Si habla español, tiene a randle disposición servicios gratuitos de asistencia lingüística. Swati al [phone].    We comply with applicable federal civil rights laws and [place] laws. We do not discriminate on the basis of race, color, national origin, age, disability, sex, sexual orientation, or gender identity.            Thank you!     Thank you for choosing [facility]  for your care. Our goal is always to provide you  with excellent care. Hearing back from our patients is one way we can continue to improve our services. Please take a few minutes to complete the written survey that you may receive in the mail after your visit with us. Thank you!             Your Updated Medication List - Protect others around you: Learn how to safely use, store and throw away your medicines at www.disposemymeds.org.          This list is accurate as of 8/30/18 12:43 PM.  Always use your most recent med list.                   Brand Name Dispense Instructions for use Diagnosis    acetaminophen 325 MG tablet    TYLENOL    100 tablet    Take 2 tablets (650 mg) by mouth every 4 hours as needed for mild pain    Right knee pain, unspecified chronicity       albuterol 108 (90 Base) MCG/ACT inhaler    PROAIR HFA/PROVENTIL HFA/VENTOLIN HFA    3 Inhaler    Inhale 1-2 puffs into the lungs every 6 hours as needed for shortness of breath / dyspnea or wheezing    Chronic obstructive pulmonary disease, unspecified COPD type (H)       atorvastatin 10 MG tablet    LIPITOR    90 tablet    Take 1 tablet (10 mg) by mouth daily    Mixed hyperlipidemia       camphor-menthol 0.5-0.5 % Lotn    DERMASARRA    444 mL    Apply a thin layer to itchy areas as often as desired    Pruritus       ferrous sulfate 325 (65 Fe) MG tablet    IRON    180 tablet    Take 1 tablet (325 mg) by mouth 2 times daily    Iron deficiency anemia, unspecified iron deficiency anemia type       hydrOXYzine 25 MG tablet    ATARAX    120 tablet    Take 1 to 2 tablets (25-50 mg) by mouth every 6 hours as needed for itching    Chronic pruritus       loratadine 10 MG tablet    CLARITIN     Take 10 mg by mouth daily        metoprolol succinate 100 MG 24 hr tablet    TOPROL-XL    90 tablet    Take 0.5 tablets (50 mg) by mouth daily    Benign essential hypertension       ranitidine 300 MG tablet    ZANTAC    180 tablet    Take 1 tablet (300 mg) by mouth 2 times daily    Gastroesophageal reflux disease  without esophagitis       tiotropium 18 MCG capsule    SPIRIVA HANDIHALER    90 capsule    Inhale 1 capsule (18 mcg) into the lungs daily    Chronic obstructive pulmonary disease, unspecified COPD type (H)       triamcinolone 0.1 % cream    KENALOG     Apply topically 2 times daily as needed for irritation        * WARFARIN SODIUM PO      Take 5 mg by mouth daily        * warfarin 5 MG tablet    COUMADIN    90 tablet    Take 1 tablet (5 mg) by mouth daily    Pulmonary embolism, bilateral (H), Long-term (current) use of anticoagulants, Paroxysmal atrial fibrillation (H)       * warfarin 5 MG tablet    COUMADIN    90 tablet    Take 1 tablet (5 mg) by mouth daily Or as directed by INR clinic    Paroxysmal atrial fibrillation (H)       * Notice:  This list has 3 medication(s) that are the same as other medications prescribed for you. Read the directions carefully, and ask your doctor or other care provider to review them with you.

## 2018-08-30 NOTE — MR AVS SNAPSHOT
Hermilo MADRID Eva   8/30/2018 10:15 AM   Anticoagulation Therapy Visit    Description:  85 year old male   Provider:  [facility]   Department:  [facility]           INR as of 8/30/2018     Today's INR 1.2!      Anticoagulation Summary as of 8/30/2018     INR goal 2.0-3.0   Today's INR 1.2!   Full warfarin instructions 8/30: 10 mg; Otherwise 5 mg every day   Next INR check     Indications   Long-term (current) use of anticoagulants [Z79.01] [Z79.01]  Pulmonary embolism  bilateral (H) [I26.99]  Paroxysmal atrial fibrillation (H) [I48.0]         Contact Numbers     Clinic Number:         August 2018 Details    Sun Mon Tue Wed Thu Fri Sat        1               2               3               4                 5               6               7               8               9               10               11                 12               13               14               15               16               17               18                 19               20               21               22               23               24               25                 26               27               28               29               30      10 mg   See details      31      5 mg           Date Details   08/30 This INR check      Date of next INR: No date specified         How to take your warfarin dose     To take:  5 mg Take 1 of the 5 mg tablets.    To take:  10 mg Take 2 of the 5 mg tablets.

## 2018-08-30 NOTE — PROGRESS NOTES
ANTICOAGULATION FOLLOW-UP CLINIC VISIT    Patient Name:  Hermilo Velasquez  Date:  2018  Contact Type:  Face to Face    SUBJECTIVE:     Patient Findings     Comments Warfarin dose was lowered last INR visit, and patient reports eating a lot of spinach lately.   Advised patient to eat dark greens in moderation.   Advising higher dose today due to today's INR 1.2 result today.   Then resume previous Warfarin dosinmg daily.              OBJECTIVE    INR Protime   Date Value Ref Range Status   2018 1.2 (A) 0.86 - 1.14 Final       ASSESSMENT / PLAN  INR assessment SUB    Recheck INR In: 2 WEEKS due to pt being out of town   INR Location Clinic      Anticoagulation Summary as of 2018     INR goal 2.0-3.0   Today's INR 1.2!   Warfarin maintenance plan 5 mg (5 mg x 1) every day   Full warfarin instructions : 10 mg; Otherwise 5 mg every day   Weekly warfarin total 35 mg   Weekly max warfarin dose 45 mg   Plan last modified Janneth Allen RN (2017)   Next INR check    Target end date Indefinite    Indications   Long-term (current) use of anticoagulants [Z79.01] [Z79.01]  Pulmonary embolism  bilateral (H) [I26.99]  Paroxysmal atrial fibrillation (H) [I48.0]         Anticoagulation Episode Summary     [facility]    Preferred lab     Send INR reminders to [facility]    Comments       Anticoagulation Care Providers     Provider Role Specialty Phone number    Karson Bishop MD Responsible Internal Medicine [phone]            See the Encounter Report to view Anticoagulation Flowsheet and Dosing Calendar (Go to Encounters tab in chart review, and find the Anticoagulation Therapy Visit)    Dosage adjustment made based on physician directed care plan.  Pt aware if signs of clotting (pain, tenderness, swelling, color change in leg or arm, SOB) and bleeding occur (blood in stool, urine, large bruising, bleeding gums, nosebleeds) to have INR check sooner. If sx  severe report to ER or concerned for stroke call 911. If general questions or concerns arise, call clinic.      Evie Mckeon RN

## 2018-08-31 LAB
IRON SATN MFR SERPL: 27 % (ref 15–46)
IRON SERPL-MCNC: 86 UG/DL (ref 35–180)
TIBC SERPL-MCNC: 313 UG/DL (ref 240–430)

## 2018-09-01 NOTE — PROGRESS NOTES
The following letter pertains to your most recent diagnostic tests:    Good news! Your iron tests are normal.  You hemoglobin is improving as we would expect.  The combination of leaky blood vessels in your stomach and intestines and the coumadin causes you to have low iron levels and anemia if you don't take an iron supplement.  Therefore, I would recommend that you continue taking your iron supplement as you are.        Sincerely,    Dr. Bishop

## 2018-09-12 ENCOUNTER — ANTICOAGULATION THERAPY VISIT (OUTPATIENT)
Dept: NURSING | Facility: CLINIC | Age: 83
End: 2018-09-12
Payer: COMMERCIAL

## 2018-09-12 DIAGNOSIS — Z79.01 LONG-TERM (CURRENT) USE OF ANTICOAGULANTS: ICD-10-CM

## 2018-09-12 DIAGNOSIS — I48.0 PAROXYSMAL ATRIAL FIBRILLATION (H): ICD-10-CM

## 2018-09-12 DIAGNOSIS — I26.99 PULMONARY EMBOLISM, BILATERAL (H): ICD-10-CM

## 2018-09-12 LAB — INR POINT OF CARE: 1.8 (ref 0.86–1.14)

## 2018-09-12 PROCEDURE — 36416 COLLJ CAPILLARY BLOOD SPEC: CPT

## 2018-09-12 PROCEDURE — 85610 PROTHROMBIN TIME: CPT | Mod: QW

## 2018-09-12 PROCEDURE — 99207 ZZC NO CHARGE NURSE ONLY: CPT

## 2018-09-12 NOTE — PROGRESS NOTES
ANTICOAGULATION FOLLOW-UP CLINIC VISIT    Patient Name:  Hermilo Velasquez  Date:  9/12/2018  Contact Type:  Face to Face    SUBJECTIVE:     Patient Findings     Positives Change in diet/appetite (Eating more greens that usual - thought that would bring his INR up since last INR 1.2 - explained to pt greens make INR lower)           OBJECTIVE    INR Protime   Date Value Ref Range Status   09/12/2018 1.8 (A) 0.86 - 1.14 Final       ASSESSMENT / PLAN  INR assessment SUB    Recheck INR In: 2 WEEKS    INR Location Clinic      Anticoagulation Summary as of 9/12/2018     INR goal 2.0-3.0   Today's INR 1.8!   Warfarin maintenance plan 5 mg (5 mg x 1) every day   Full warfarin instructions 9/12: 7.5 mg; 9/19: 7.5 mg; Otherwise 5 mg every day   Weekly warfarin total 35 mg   Weekly max warfarin dose 45 mg   Plan last modified Janneth Allen RN (8/22/2017)   Next INR check 9/26/2018   Target end date Indefinite    Indications   Long-term (current) use of anticoagulants [Z79.01] [Z79.01]  Pulmonary embolism  bilateral (H) [I26.99]  Paroxysmal atrial fibrillation (H) [I48.0]         Anticoagulation Episode Summary     [facility]    Preferred lab     Send INR reminders to [facility]    Comments       Anticoagulation Care Providers     Provider Role Specialty Phone number    Karson Bishop MD [facility] Internal Medicine [phone]            See the Encounter Report to view Anticoagulation Flowsheet and Dosing Calendar (Go to Encounters tab in chart review, and find the Anticoagulation Therapy Visit)    Dosage adjustment made based on physician directed care plan.    Fernanda Kenny RN

## 2018-09-12 NOTE — MR AVS SNAPSHOT
Hermilo Velasquez   9/12/2018 1:30 PM   Anticoagulation Therapy Visit    Description:  85 year old male   Provider:   [facility]ATION [facility]INIC   Department:   Nurse           INR as of 9/12/2018     Today's INR 1.8!      Anticoagulation Summary as of 9/12/2018     INR goal 2.0-3.0   Today's INR 1.8!   Full warfarin instructions 9/12: 7.5 mg; 9/19: 7.5 mg; Otherwise 5 mg every day   Next INR check 9/26/2018    Indications   Long-term (current) use of anticoagulants [Z79.01] [Z79.01]  Pulmonary embolism  bilateral (H) [I26.99]  Paroxysmal atrial fibrillation (H) [I48.0]         Your next Anticoagulation Clinic appointment(s)     Sep 26, 2018  1:45 PM CDT   Anticoagulation Visit with  ANTICOAGULATION CLINIC   [facility] ([facility])    [address]  [place] [address]   [phone]              Contact Numbers     Clinic Number:         September 2018 Details    Sun Mon Tue Wed Thu Fri Sat           1                 2               3               4               5               6               7               8                 9               10               11               12      7.5 mg   See details      13      5 mg         14      5 mg         15      5 mg           16      5 mg         17      5 mg         18      5 mg         19      7.5 mg         20      5 mg         21      5 mg         22      5 mg           23      5 mg         24      5 mg         25      5 mg         26            27               28               29                 30                      Date Details   09/12 This INR check       Date of next INR:  9/26/2018         How to take your warfarin dose     To take:  5 mg Take 1 of the 5 mg tablets.    To take:  7.5 mg Take 1.5 of the 5 mg tablets.

## 2018-09-26 ENCOUNTER — ANTICOAGULATION THERAPY VISIT (OUTPATIENT)
Dept: NURSING | Facility: CLINIC | Age: 83
End: 2018-09-26
Payer: COMMERCIAL

## 2018-09-26 DIAGNOSIS — I26.99 PULMONARY EMBOLISM, BILATERAL (H): ICD-10-CM

## 2018-09-26 DIAGNOSIS — Z79.01 LONG-TERM (CURRENT) USE OF ANTICOAGULANTS: ICD-10-CM

## 2018-09-26 DIAGNOSIS — I48.0 PAROXYSMAL ATRIAL FIBRILLATION (H): ICD-10-CM

## 2018-09-26 LAB — INR POINT OF CARE: 3.8 (ref 0.86–1.14)

## 2018-09-26 PROCEDURE — 36416 COLLJ CAPILLARY BLOOD SPEC: CPT

## 2018-09-26 PROCEDURE — 99207 ZZC NO CHARGE NURSE ONLY: CPT

## 2018-09-26 PROCEDURE — 85610 PROTHROMBIN TIME: CPT | Mod: QW

## 2018-09-26 NOTE — MR AVS SNAPSHOT
Hermilo MADRID Leakevin   9/26/2018 1:45 PM   Anticoagulation Therapy Visit    Description:  85 year old male   Provider:   ANTICOAGULATION CLINIC   Department:   Nurse           INR as of 9/26/2018     Today's INR 3.8!      Anticoagulation Summary as of 9/26/2018     INR goal 2.0-3.0   Today's INR 3.8!   Full warfarin instructions 5 mg every day   Next INR check 10/3/2018    Indications   Long-term (current) use of anticoagulants [Z79.01] [Z79.01]  Pulmonary embolism  bilateral (H) [I26.99]  Paroxysmal atrial fibrillation (H) [I48.0]         Your next Anticoagulation Clinic appointment(s)     Oct 03, 2018  1:45 PM CDT   Anticoagulation Visit with  ANTICOAGULATION CLINIC   [facility] ([facility])    [address]  [place] [address]   [phone]              Contact Numbers     Clinic Number:         September 2018 Details    Sun Mon Tue Wed Thu Fri Sat           1                 2               3               4               5               6               7               8                 9               10               11               12               13               14               15                 16               17               18               19               20               21               22                 23               24               25               26      5 mg   See details      27      5 mg         28      5 mg         29      5 mg           30      5 mg                Date Details   09/26 This INR check               How to take your warfarin dose     To take:  5 mg Take 1 of the 5 mg tablets.           October 2018 Details    Sun Mon Tue Wed Thu Fri Sat      1      5 mg         2      5 mg         3            4               5               6                 7               8               9               10               11               12               13                 14               15               16               17               18                19               20                 21               22               23               24               25               26               27                 28               29               30               31                   Date Details   No additional details    Date of next INR:  10/3/2018         How to take your warfarin dose     To take:  5 mg Take 1 of the 5 mg tablets.

## 2018-10-03 ENCOUNTER — ANTICOAGULATION THERAPY VISIT (OUTPATIENT)
Dept: NURSING | Facility: CLINIC | Age: 83
End: 2018-10-03
Payer: COMMERCIAL

## 2018-10-03 DIAGNOSIS — I48.0 PAROXYSMAL ATRIAL FIBRILLATION (H): ICD-10-CM

## 2018-10-03 DIAGNOSIS — I26.99 PULMONARY EMBOLISM, BILATERAL (H): ICD-10-CM

## 2018-10-03 LAB — INR POINT OF CARE: 3.5 (ref 0.86–1.14)

## 2018-10-03 PROCEDURE — 36416 COLLJ CAPILLARY BLOOD SPEC: CPT

## 2018-10-03 PROCEDURE — 85610 PROTHROMBIN TIME: CPT | Mod: QW

## 2018-10-03 PROCEDURE — 99207 ZZC NO CHARGE NURSE ONLY: CPT

## 2018-10-03 NOTE — PROGRESS NOTES
ANTICOAGULATION FOLLOW-UP CLINIC VISIT    Patient Name:  Hermilo Velasquez  Date:  10/3/2018  Contact Type:  Face to Face    SUBJECTIVE:     Patient Findings     Positives No Problem Findings           OBJECTIVE    INR Protime   Date Value Ref Range Status   10/03/2018 3.5 (A) 0.86 - 1.14 Final       ASSESSMENT / PLAN  INR assessment SUPRA    Recheck INR In: 2 WEEKS    INR Location Clinic      Anticoagulation Summary as of 10/3/2018     INR goal 2.0-3.0   Today's INR 3.5!   Warfarin maintenance plan 5 mg (5 mg x 1) every day   Full warfarin instructions 10/3: 2.5 mg; Otherwise 5 mg every day   Weekly warfarin total 35 mg   Weekly max warfarin dose 45 mg   Plan last modified Janneth Allen RN (8/22/2017)   Next INR check 10/17/2018   Target end date Indefinite    Indications   Long-term (current) use of anticoagulants [Z79.01] [Z79.01]  Pulmonary embolism  bilateral (H) [I26.99]  Paroxysmal atrial fibrillation (H) [I48.0]         Anticoagulation Episode Summary     [facility]    Preferred lab     Send INR reminders to [facility]    Comments       Anticoagulation Care Providers     Provider Role Specialty Phone number    Karson Bishop MD Responsible Internal Medicine [phone]            See the Encounter Report to view Anticoagulation Flowsheet and Dosing Calendar (Go to Encounters tab in chart review, and find the Anticoagulation Therapy Visit)    Dosage adjustment made based on physician directed care plan. INR 3.5.  Advised 2.5 mg today only, then resume 5 mg daily.  Lengthy discussion re: canned spinach, which he eats once in awhile.  Will be careful with this.    Analisa Vigil RN

## 2018-10-03 NOTE — MR AVS SNAPSHOT
Hermilo MADRID Eva   10/3/2018 1:45 PM   Anticoagulation Therapy Visit    Description:  85 year old male   Provider:   ANTICOAGULATION CLINIC   Department:   Nurse           INR as of 10/3/2018     Today's INR 3.5!      Anticoagulation Summary as of 10/3/2018     INR goal 2.0-3.0   Today's INR 3.5!   Full warfarin instructions 10/3: 2.5 mg; Otherwise 5 mg every day   Next INR check 10/24/2018    Indications   Long-term (current) use of anticoagulants [Z79.01] [Z79.01]  Pulmonary embolism  bilateral (H) [I26.99]  Paroxysmal atrial fibrillation (H) [I48.0]         Your next Anticoagulation Clinic appointment(s)     Oct 24, 2018  1:45 PM CDT   Anticoagulation Visit with  ANTICOAGULATION CLINIC   [facility] [place] ([facility])    [address]  [place] [address]   [phone]              Contact Numbers     Clinic Number:         October 2018 Details    Sun Mon Tue Wed Thu Fri Sat      1               2               3      2.5 mg   See details      4      5 mg         5      5 mg         6      5 mg           7      5 mg         8      5 mg         9      5 mg         10      5 mg         11      5 mg         12      5 mg         13      5 mg           14      5 mg         15      5 mg         16      5 mg         17      5 mg         18      5 mg         19      5 mg         20      5 mg           21      5 mg         22      5 mg         23      5 mg         24            25               26               27                 28               29               30               31                   Date Details   10/03 This INR check       Date of next INR:  10/24/2018         How to take your warfarin dose     To take:  2.5 mg Take 0.5 of a 5 mg tablet.    To take:  5 mg Take 1 of the 5 mg tablets.

## 2018-10-24 ENCOUNTER — ANTICOAGULATION THERAPY VISIT (OUTPATIENT)
Dept: NURSING | Facility: CLINIC | Age: 83
End: 2018-10-24
Payer: COMMERCIAL

## 2018-10-24 ENCOUNTER — TELEPHONE (OUTPATIENT)
Dept: FAMILY MEDICINE | Facility: CLINIC | Age: 83
End: 2018-10-24

## 2018-10-24 DIAGNOSIS — I48.0 PAROXYSMAL ATRIAL FIBRILLATION (H): ICD-10-CM

## 2018-10-24 DIAGNOSIS — I26.99 PULMONARY EMBOLISM, BILATERAL (H): ICD-10-CM

## 2018-10-24 DIAGNOSIS — I27.82 OTHER CHRONIC PULMONARY EMBOLISM WITHOUT ACUTE COR PULMONALE (H): Primary | ICD-10-CM

## 2018-10-24 LAB — INR POINT OF CARE: 1.7 (ref 0.86–1.14)

## 2018-10-24 PROCEDURE — 85610 PROTHROMBIN TIME: CPT | Mod: QW

## 2018-10-24 PROCEDURE — 99207 ZZC NO CHARGE NURSE ONLY: CPT

## 2018-10-24 PROCEDURE — 36416 COLLJ CAPILLARY BLOOD SPEC: CPT

## 2018-10-24 NOTE — PROGRESS NOTES
ANTICOAGULATION FOLLOW-UP CLINIC VISIT    Patient Name:  Hermilo Velasquez  Date:  10/24/2018  Contact Type:  Face to Face    SUBJECTIVE:     Patient Findings     Positives Unexplained INR or factor level change           OBJECTIVE    INR Protime   Date Value Ref Range Status   10/24/2018 1.7 (A) 0.86 - 1.14 Final       ASSESSMENT / PLAN  INR assessment SUB    Recheck INR In: 1 WEEK    INR Location Clinic      Anticoagulation Summary as of 10/24/2018     INR goal 2.0-3.0   Today's INR 1.7!   Warfarin maintenance plan 5 mg (5 mg x 1) every day   Full warfarin instructions 10/24: 7.5 mg; 10/27: 7.5 mg; Otherwise 5 mg every day   Weekly warfarin total 35 mg   Weekly max warfarin dose 45 mg   Plan last modified Janneth Allen RN (8/22/2017)   Next INR check 10/31/2018   Target end date Indefinite    Indications   Long-term (current) use of anticoagulants [Z79.01] [Z79.01]  Pulmonary embolism  bilateral (H) [I26.99]  Paroxysmal atrial fibrillation (H) [I48.0]         Anticoagulation Episode Summary     [facility]    Preferred lab     Send INR reminders to [facility]    Comments       Anticoagulation Care Providers     Provider Role Specialty Phone number    Karson Bishop MD Responsible Internal Medicine [phone]            See the Encounter Report to view Anticoagulation Flowsheet and Dosing Calendar (Go to Encounters tab in chart review, and find the Anticoagulation Therapy Visit)    Dosage adjustment made based on physician directed care plan.    Dione Giron RN

## 2018-10-24 NOTE — MR AVS SNAPSHOT
Hermilo MADRID Le[place]vin   10/24/2018 1:45 PM   Anticoagulation Therapy Visit    Description:  85 year old male   Provider:   ANTICOAGULATION CLINIC   Department:   Nurse           INR as of 10/24/2018     Today's INR 1.7!      Anticoagulation Summary as of 10/24/2018     INR goal 2.0-3.0   Today's INR 1.7!   Full warfarin instructions 10/24: 7.5 mg; 10/27: 7.5 mg; Otherwise 5 mg every day   Next INR check 10/31/2018    Indications   Long-term (current) use of anticoagulants [Z79.01] [Z79.01]  Pulmonary embolism  bilateral (H) [I26.99]  Paroxysmal atrial fibrillation (H) [I48.0]         Your next Anticoagulation Clinic appointment(s)     Oct 31, 2018  1:45 PM CDT   Anticoagulation Visit with  ANTICOAGULATION CLINIC   [facility] [place] ([facility])    [address]  [place] [address]   [phone]              Contact Numbers     Clinic Number:         October 2018 Details    Sun Mon Tue Wed Thu Fri Sat      1               2               3               4               5               6                 7               8               9               10               11               12               13                 14               15               16               17               18               19               20                 21               22               23               24      7.5 mg   See details      25      5 mg         26      5 mg         27      7.5 mg           28      5 mg         29      5 mg         30      5 mg         31                Date Details   10/24 This INR check       Date of next INR:  10/31/2018         How to take your warfarin dose     To take:  5 mg Take 1 of the 5 mg tablets.    To take:  7.5 mg Take 1.5 of the 5 mg tablets.

## 2018-10-26 ENCOUNTER — APPOINTMENT (OUTPATIENT)
Age: 83
Setting detail: DERMATOLOGY
End: 2018-10-27

## 2018-10-26 DIAGNOSIS — L29.8 OTHER PRURITUS: ICD-10-CM

## 2018-10-26 DIAGNOSIS — L30.9 DERMATITIS, UNSPECIFIED: ICD-10-CM

## 2018-10-26 PROCEDURE — OTHER PRESCRIPTION: OTHER

## 2018-10-26 PROCEDURE — OTHER BIOPSY BY PUNCH METHOD: OTHER

## 2018-10-26 PROCEDURE — OTHER COUNSELING: OTHER

## 2018-10-26 PROCEDURE — 99214 OFFICE O/P EST MOD 30 MIN: CPT | Mod: 25

## 2018-10-26 PROCEDURE — 11100: CPT

## 2018-10-26 PROCEDURE — OTHER MIPS QUALITY: OTHER

## 2018-10-26 RX ORDER — PREDNISONE 20 MG/1
20 MG TABLET ORAL QD
Qty: 10 | Refills: 0 | Status: ERX

## 2018-10-26 ASSESSMENT — LOCATION SIMPLE DESCRIPTION DERM
LOCATION SIMPLE: CHEST
LOCATION SIMPLE: ABDOMEN
LOCATION SIMPLE: RIGHT UPPER BACK

## 2018-10-26 ASSESSMENT — LOCATION DETAILED DESCRIPTION DERM
LOCATION DETAILED: EPIGASTRIC SKIN
LOCATION DETAILED: RIGHT SUPERIOR UPPER BACK
LOCATION DETAILED: RIGHT LATERAL SUPERIOR CHEST

## 2018-10-26 ASSESSMENT — LOCATION ZONE DERM: LOCATION ZONE: TRUNK

## 2018-10-26 ASSESSMENT — SEVERITY ASSESSMENT: SEVERITY: MODERATE

## 2018-10-26 ASSESSMENT — BSA RASH: BSA RASH: 37

## 2018-10-26 NOTE — PROCEDURE: BIOPSY BY PUNCH METHOD
Consent: Written consent was obtained and risks were reviewed including but not limited to scarring, infection, bleeding, scabbing, incomplete removal, nerve damage and allergy to anesthesia.
Post-Care Instructions: I reviewed with the patient in detail post-care instructions. Patient is to keep the biopsy site dry overnight, and then apply bacitracin twice daily until healed. Patient may apply hydrogen peroxide soaks to remove any crusting.
Hemostasis: Pressure
Body Location Override (Optional - Billing Will Still Be Based On Selected Body Map Location If Applicable): right mid upper chest
Size Of Lesion In Cm (Optional): 0
Bill 53578 For Specimen Handling/Conveyance To Laboratory?: no
Punch Size In Mm: 4
Dressing: pressure dressing with telfa
Epidermal Sutures: 5-0 Monosof
Biopsy Type: H and E
Home Suture Removal Text: Patient was provided a home suture removal kit and will remove their sutures at home.  If they have any questions or difficulties they will call the office.
Notification Instructions: Patient will be notified of biopsy results. However, patient instructed to call the office if not contacted within 2 weeks.
Anesthesia Volume In Cc (Will Not Render If 0): 1
Anesthesia Type: 1% lidocaine with epinephrine
Detail Level: Simple
Wound Care: Petrolatum
Suture Removal: 14 days
Billing Type: Third-Party Bill
Render Post-Care Instructions In Note?: yes

## 2018-10-26 NOTE — PROCEDURE: MIPS QUALITY
Quality 265: Biopsy Follow-Up: Biopsy results reviewed, communicated, tracked, and documented
Quality 131: Pain Assessment And Follow-Up: Pain assessment using a standardized tool is documented as negative, no follow-up plan required
Detail Level: Detailed
Quality 431: Preventive Care And Screening: Unhealthy Alcohol Use - Screening: Patient screened for unhealthy alcohol use using a single question and scores less than 2 times per year
Quality 130: Documentation Of Current Medications In The Medical Record: Current Medications Documented
Quality 226: Preventive Care And Screening: Tobacco Use: Screening And Cessation Intervention: Patient screened for tobacco and is an ex-smoker
Quality 110: Preventive Care And Screening: Influenza Immunization: Influenza immunization was not ordered or administered, reason not given

## 2018-10-30 DIAGNOSIS — K92.1 GASTROINTESTINAL HEMORRHAGE WITH MELENA: ICD-10-CM

## 2018-10-30 NOTE — TELEPHONE ENCOUNTER
"Last Written Prescription Date:    Last Fill Quantity: ,  # refills:    Last office visit: 8/30/2018 with prescribing provider:  NOT ON PATIENTS MEDS LIST.   Future Office Visit:      Requested Prescriptions   Pending Prescriptions Disp Refills     omeprazole (PRILOSEC) 20 MG CR capsule [Pharmacy Med Name: Omeprazole Oral Capsule Delayed Release 20 MG] 180 capsule 0     Sig: TAKE TWO CAPSULES BY MOUTH DAILY. PLEASE SCHEDULE OFFICE VISIT TO DISCUSS FURTHER REFILLS -[phone].    PPI Protocol Passed    10/30/2018 10:09 AM       Passed - Not on Clopidogrel (unless Pantoprazole ordered)       Passed - No diagnosis of osteoporosis on record       Passed - Recent (12 mo) or future (30 days) visit within the authorizing provider's specialty    Patient had office visit in the last 12 months or has a visit in the next 30 days with authorizing provider or within the authorizing provider's specialty.  See \"Patient Info\" tab in inbasket, or \"Choose Columns\" in Meds & Orders section of the refill encounter.             Passed - Patient is age 18 or older          "

## 2018-10-31 ENCOUNTER — ANTICOAGULATION THERAPY VISIT (OUTPATIENT)
Dept: NURSING | Facility: CLINIC | Age: 83
End: 2018-10-31
Payer: COMMERCIAL

## 2018-10-31 DIAGNOSIS — I48.0 PAROXYSMAL ATRIAL FIBRILLATION (H): ICD-10-CM

## 2018-10-31 DIAGNOSIS — I26.99 PULMONARY EMBOLISM, BILATERAL (H): ICD-10-CM

## 2018-10-31 LAB — INR POINT OF CARE: 2.1 (ref 0.86–1.14)

## 2018-10-31 PROCEDURE — 85610 PROTHROMBIN TIME: CPT | Mod: QW

## 2018-10-31 PROCEDURE — 99207 ZZC NO CHARGE NURSE ONLY: CPT

## 2018-10-31 PROCEDURE — 36416 COLLJ CAPILLARY BLOOD SPEC: CPT

## 2018-10-31 NOTE — MR AVS SNAPSHOT
Hermilo Velasquez   10/31/2018 1:45 PM   Anticoagulation Therapy Visit    Description:  85 year old male   Provider:   ANTICOAGULATION CLINIC   Department:   Nurse           INR as of 10/31/2018     Today's INR 2.1      Anticoagulation Summary as of 10/31/2018     INR goal 2.0-3.0   Today's INR 2.1   Full warfarin instructions 7.5 mg on Wed, Sat; 5 mg all other days   Next INR check 11/20/2018    Indications   Long-term (current) use of anticoagulants [Z79.01] [Z79.01]  Pulmonary embolism  bilateral (H) [I26.99]  Paroxysmal atrial fibrillation (H) [I48.0]         Your next Anticoagulation Clinic appointment(s)     Nov 20, 2018  2:00 PM CST   Anticoagulation Visit with  ANTICOAGULATION CLINIC   [facility] ([facility])    [address]  [place] [address]   [phone]              Contact Numbers     Clinic Number:         October 2018 Details    Sun Mon Tue Wed Thu Fri Sat      1               2               3               4               5               6                 7               8               9               10               11               12               13                 14               15               16               17               18               19               20                 21               22               23               24               25               26               27                 28               29               30               31      7.5 mg   See details          Date Details   10/31 This INR check               How to take your warfarin dose     To take:  7.5 mg Take 1.5 of the 5 mg tablets.           November 2018 Details    Sun Mon Tue Wed Thu Fri Sat         1      5 mg         2      5 mg         3      7.5 mg           4      5 mg         5      5 mg         6      5 mg         7      7.5 mg         8      5 mg         9      5 mg         10      7.5 mg           11      5 mg         12      5 mg         13      5 mg          14      7.5 mg         15      5 mg         16      5 mg         17      7.5 mg           18      5 mg         19      5 mg         20            21               22               23               24                 25               26               27               28               29               30                 Date Details   No additional details    Date of next INR:  11/20/2018         How to take your warfarin dose     To take:  5 mg Take 1 of the 5 mg tablets.    To take:  7.5 mg Take 1.5 of the 5 mg tablets.

## 2018-10-31 NOTE — TELEPHONE ENCOUNTER
Pt in for INR today.  PT reports this was originally discontinued back in July by Derm due to itching, but pt is still itching to same level.  Pt restarted 1 week ago with OTC supply, but is requesting refill as too costly.  Repended and routing to PCP for review.  Dione Giron RN

## 2018-10-31 NOTE — PROGRESS NOTES
ANTICOAGULATION FOLLOW-UP CLINIC VISIT    Patient Name:  Hermilo Velasquez  Date:  10/31/2018  Contact Type:  Face to Face    SUBJECTIVE:     Patient Findings     Positives No Problem Findings           OBJECTIVE    INR Protime   Date Value Ref Range Status   10/31/2018 2.1 (A) 0.86 - 1.14 Final       ASSESSMENT / PLAN  INR assessment THER    Recheck INR In: 3 WEEKS    INR Location Clinic      Anticoagulation Summary as of 10/31/2018     INR goal 2.0-3.0   Today's INR 2.1   Warfarin maintenance plan 7.5 mg (5 mg x 1.5) on Wed, Sat; 5 mg (5 mg x 1) all other days   Full warfarin instructions 7.5 mg on Wed, Sat; 5 mg all other days   Weekly warfarin total 40 mg   Weekly max warfarin dose 45 mg   Plan last modified Dione Giron RN (10/31/2018)   Next INR check 11/20/2018   Target end date Indefinite    Indications   Long-term (current) use of anticoagulants [Z79.01] [Z79.01]  Pulmonary embolism  bilateral (H) [I26.99]  Paroxysmal atrial fibrillation (H) [I48.0]         Anticoagulation Episode Summary     [facility] check [facility]    Preferred lab     Send INR reminders to [facility]    Comments       Anticoagulation Care Providers     Provider Role Specialty Phone number    Karson Bishop MD Responsible Internal Medicine [phone]            See the Encounter Report to view Anticoagulation Flowsheet and Dosing Calendar (Go to Encounters tab in chart review, and find the Anticoagulation Therapy Visit)    Dosage adjustment made based on physician directed care plan.    Advised sooner recheck but [place] and will not be able to come here or elsewhere for recheck sooner than scheduled.   Pt aware if signs of clotting (pain, tenderness, swelling, color change in leg or arm, SOB) and bleeding occur (blood in stool, urine, large bruising, bleeding gums, nosebleeds) to have INR check sooner. If sx severe report to ER or concerned for stroke call 911. If general questions  or concerns arise, call clinic.         Dione Giron RN

## 2018-11-01 ENCOUNTER — APPOINTMENT (OUTPATIENT)
Age: 83
Setting detail: DERMATOLOGY
End: 2018-11-04

## 2018-11-01 DIAGNOSIS — L738 OTHER SPECIFIED DISEASES OF HAIR AND HAIR FOLLICLES: ICD-10-CM

## 2018-11-01 DIAGNOSIS — L663 OTHER SPECIFIED DISEASES OF HAIR AND HAIR FOLLICLES: ICD-10-CM

## 2018-11-01 PROBLEM — L02.92 FURUNCLE, UNSPECIFIED: Status: ACTIVE | Noted: 2018-11-01

## 2018-11-01 PROCEDURE — OTHER PRESCRIPTION: OTHER

## 2018-11-01 PROCEDURE — OTHER COUNSELING: OTHER

## 2018-11-01 RX ORDER — DOXYCYCLINE 100 MG/1
100 MG CAPSULE ORAL BID
Qty: 60 | Refills: 0 | Status: ERX | COMMUNITY
Start: 2018-11-01

## 2018-11-01 ASSESSMENT — SEVERITY ASSESSMENT: SEVERITY: MILD TO MODERATE

## 2018-11-01 ASSESSMENT — BSA RASH: BSA RASH: 50

## 2018-11-08 ENCOUNTER — APPOINTMENT (OUTPATIENT)
Age: 83
Setting detail: DERMATOLOGY
End: 2018-12-10

## 2018-11-08 DIAGNOSIS — L663 OTHER SPECIFIED DISEASES OF HAIR AND HAIR FOLLICLES: ICD-10-CM

## 2018-11-08 DIAGNOSIS — L738 OTHER SPECIFIED DISEASES OF HAIR AND HAIR FOLLICLES: ICD-10-CM

## 2018-11-08 DIAGNOSIS — Z48.02 ENCOUNTER FOR REMOVAL OF SUTURES: ICD-10-CM

## 2018-11-08 PROBLEM — L02.222 FURUNCLE OF BACK [ANY PART, EXCEPT BUTTOCK]: Status: ACTIVE | Noted: 2018-11-08

## 2018-11-08 PROBLEM — L02.221 FURUNCLE OF ABDOMINAL WALL: Status: ACTIVE | Noted: 2018-11-08

## 2018-11-08 PROCEDURE — OTHER DIAGNOSIS COMMENT: OTHER

## 2018-11-08 PROCEDURE — 99213 OFFICE O/P EST LOW 20 MIN: CPT

## 2018-11-08 PROCEDURE — OTHER MIPS QUALITY: OTHER

## 2018-11-08 PROCEDURE — OTHER COUNSELING: OTHER

## 2018-11-08 PROCEDURE — OTHER SUTURE REMOVAL (GLOBAL PERIOD): OTHER

## 2018-11-08 ASSESSMENT — LOCATION SIMPLE DESCRIPTION DERM
LOCATION SIMPLE: RIGHT UPPER BACK
LOCATION SIMPLE: UPPER BACK
LOCATION SIMPLE: CHEST
LOCATION SIMPLE: ABDOMEN
LOCATION SIMPLE: ABDOMEN

## 2018-11-08 ASSESSMENT — LOCATION DETAILED DESCRIPTION DERM
LOCATION DETAILED: EPIGASTRIC SKIN
LOCATION DETAILED: INFERIOR THORACIC SPINE
LOCATION DETAILED: RIGHT INFERIOR MEDIAL UPPER BACK
LOCATION DETAILED: EPIGASTRIC SKIN
LOCATION DETAILED: RIGHT LATERAL SUPERIOR CHEST

## 2018-11-08 ASSESSMENT — LOCATION ZONE DERM
LOCATION ZONE: TRUNK
LOCATION ZONE: TRUNK

## 2018-11-08 ASSESSMENT — SEVERITY ASSESSMENT: SEVERITY: MODERATE

## 2018-11-08 ASSESSMENT — BSA RASH: BSA RASH: 20

## 2018-11-08 NOTE — PROCEDURE: MIPS QUALITY
Quality 131: Pain Assessment And Follow-Up: Pain assessment using a standardized tool is documented as negative, no follow-up plan required
Quality 431: Preventive Care And Screening: Unhealthy Alcohol Use - Screening: Patient screened for unhealthy alcohol use using a single question and scores less than 2 times per year
Detail Level: Detailed
Quality 130: Documentation Of Current Medications In The Medical Record: Current Medications Documented
Quality 110: Preventive Care And Screening: Influenza Immunization: Influenza immunization was not ordered or administered, reason not given
Quality 226: Preventive Care And Screening: Tobacco Use: Screening And Cessation Intervention: Patient screened for tobacco and is an ex-smoker

## 2018-11-08 NOTE — PROCEDURE: SUTURE REMOVAL (GLOBAL PERIOD)
Add 34972 Cpt? (Important Note: In 2017 The Use Of 88681 Is Being Tracked By Cms To Determine Future Global Period Reimbursement For Global Periods): no
Body Location Override (Optional - Billing Will Still Be Based On Selected Body Map Location If Applicable): right mid upper chest
Detail Level: Detailed

## 2018-11-08 NOTE — PROCEDURE: COUNSELING
Detail Level: Zone
Detail Level: Detailed
Patient Specific Counseling (Will Not Stick From Patient To Patient): Patient will continue antibiotic (doxycycline 100mg BID) for full 30 day course. \\n\\Jake the 30 day sommer patient will call clinic to update on if rash is still present or resolved. If present discussed continuing antibiotic for an additional 30days. \\n\\nContinue benzoyl peroxide wash to chest and back. \\nApply Sarna anti itch moisturizer BID to entire body.\\nApply Triamcinolone cream to extremely persistent itchy areas BID.\\n\\nAdvised: avoiding chocolate before bed as it causes his acid reflux, but can consume at other times of the time.

## 2018-11-20 ENCOUNTER — ANTICOAGULATION THERAPY VISIT (OUTPATIENT)
Dept: NURSING | Facility: CLINIC | Age: 83
End: 2018-11-20
Payer: COMMERCIAL

## 2018-11-20 DIAGNOSIS — I26.99 PULMONARY EMBOLISM, BILATERAL (H): ICD-10-CM

## 2018-11-20 DIAGNOSIS — I48.0 PAROXYSMAL ATRIAL FIBRILLATION (H): ICD-10-CM

## 2018-11-20 LAB — INR POINT OF CARE: 2.9 (ref 0.86–1.14)

## 2018-11-20 PROCEDURE — 99207 ZZC NO CHARGE NURSE ONLY: CPT

## 2018-11-20 PROCEDURE — 85610 PROTHROMBIN TIME: CPT | Mod: QW

## 2018-11-20 PROCEDURE — 36416 COLLJ CAPILLARY BLOOD SPEC: CPT

## 2018-11-20 NOTE — PROGRESS NOTES
ANTICOAGULATION FOLLOW-UP CLINIC VISIT    Patient Name:  Hermilo Velasquez  Date:  11/20/2018  Contact Type:  Face to Face    SUBJECTIVE:     Patient Findings     Positives No Problem Findings           OBJECTIVE    INR Protime   Date Value Ref Range Status   11/20/2018 2.9 (A) 0.86 - 1.14 Final       ASSESSMENT / PLAN  INR assessment THER    Recheck INR In: 4 WEEKS    INR Location Clinic      Anticoagulation Summary as of 11/20/2018     INR goal 2.0-3.0   Today's INR 2.9   Warfarin maintenance plan 7.5 mg (5 mg x 1.5) on Wed, Sat; 5 mg (5 mg x 1) all other days   Full warfarin instructions 7.5 mg on Wed, Sat; 5 mg all other days   Weekly warfarin total 40 mg   Weekly max warfarin dose 45 mg   No change documented Analisa Vigil RN   Plan last modified Dione Giron RN (10/31/2018)   Next INR check 12/18/2018   Target end date Indefinite    Indications   Long-term (current) use of anticoagulants [Z79.01] [Z79.01]  Pulmonary embolism  bilateral (H) [I26.99]  Paroxysmal atrial fibrillation (H) [I48.0]         Anticoagulation Episode Summary     [facility]    Preferred lab     Send INR reminders to [facility]    Comments       Anticoagulation Care Providers     Provider Role Specialty Phone number    Karson Bishop MD [facility] Internal Medicine [phone]            See the Encounter Report to view Anticoagulation Flowsheet and Dosing Calendar (Go to Encounters tab in chart review, and find the Anticoagulation Therapy Visit)    Dosage adjustment made based on physician directed care plan. INR 2.9;  Continue same and recheck 4 weeks.    Analisa Vigil RN

## 2018-11-20 NOTE — MR AVS SNAPSHOT
Hermilo MADRID Eva   11/20/2018 2:00 PM   Anticoagulation Therapy Visit    Description:  86 year old male   Provider:   ANTICOAGULATION CLINIC   Department:  [facility] Nurse           INR as of 11/20/2018     Today's INR 2.9      Anticoagulation Summary as of 11/20/2018     INR goal 2.0-3.0   Today's INR 2.9   Full warfarin instructions 7.5 mg on Wed, Sat; 5 mg all other days   Next INR check 12/18/2018    Indications   Long-term (current) use of anticoagulants [Z79.01] [Z79.01]  Pulmonary embolism  bilateral (H) [I26.99]  Paroxysmal atrial fibrillation (H) [I48.0]         Your next Anticoagulation Clinic appointment(s)     Dec 18, 2018  1:30 PM CST   Anticoagulation Visit with  ANTICOAGULATION CLINIC   [facility] ([facility])    [address]  [place] [address]   [phone]              Contact Numbers     Clinic Number:         November 2018 Details    Sun Mon Tue Wed Thu Fri Sat         1               2               3                 4               5               6               7               8               9               10                 11               12               13               14               15               16               17                 18               19               20      5 mg   See details      21      7.5 mg         22      5 mg         23      5 mg         24      7.5 mg           25      5 mg         26      5 mg         27      5 mg         28      7.5 mg         29      5 mg         30      5 mg           Date Details   11/20 This INR check               How to take your warfarin dose     To take:  5 mg Take 1 of the 5 mg tablets.    To take:  7.5 mg Take 1.5 of the 5 mg tablets.           December 2018 Details    Sun Mon Tue Wed Thu Fri Sat           1      7.5 mg           2      5 mg         3      5 mg         4      5 mg         5      7.5 mg         6      5 mg         7      5 mg         8      7.5 mg           9      5 mg          10      5 mg         11      5 mg         12      7.5 mg         13      5 mg         14      5 mg         15      7.5 mg           16      5 mg         17      5 mg         18            19               20               21               22                 23               24               25               26               27               28               29                 30               31                     Date Details   No additional details    Date of next INR:  12/18/2018         How to take your warfarin dose     To take:  5 mg Take 1 of the 5 mg tablets.    To take:  7.5 mg Take 1.5 of the 5 mg tablets.

## 2018-12-04 ENCOUNTER — APPOINTMENT (OUTPATIENT)
Age: 83
Setting detail: DERMATOLOGY
End: 2018-12-30

## 2018-12-04 DIAGNOSIS — L29.8 OTHER PRURITUS: ICD-10-CM

## 2018-12-04 PROCEDURE — OTHER COUNSELING: OTHER

## 2018-12-04 ASSESSMENT — LOCATION ZONE DERM
LOCATION ZONE: TRUNK
LOCATION ZONE: ARM

## 2018-12-04 ASSESSMENT — LOCATION DETAILED DESCRIPTION DERM
LOCATION DETAILED: RIGHT SUPERIOR MEDIAL UPPER BACK
LOCATION DETAILED: LEFT DISTAL POSTERIOR UPPER ARM
LOCATION DETAILED: PERIUMBILICAL SKIN

## 2018-12-04 ASSESSMENT — LOCATION SIMPLE DESCRIPTION DERM
LOCATION SIMPLE: LEFT UPPER ARM
LOCATION SIMPLE: ABDOMEN
LOCATION SIMPLE: RIGHT UPPER BACK

## 2018-12-04 ASSESSMENT — ITCH INTENSITY: HOW SEVERE IS YOUR ITCHING?: 5

## 2018-12-10 ENCOUNTER — APPOINTMENT (OUTPATIENT)
Age: 83
Setting detail: DERMATOLOGY
End: 2018-12-30

## 2018-12-10 DIAGNOSIS — L663 OTHER SPECIFIED DISEASES OF HAIR AND HAIR FOLLICLES: ICD-10-CM

## 2018-12-10 DIAGNOSIS — L738 OTHER SPECIFIED DISEASES OF HAIR AND HAIR FOLLICLES: ICD-10-CM

## 2018-12-10 DIAGNOSIS — L20.89 OTHER ATOPIC DERMATITIS: ICD-10-CM

## 2018-12-10 DIAGNOSIS — L29.8 OTHER PRURITUS: ICD-10-CM

## 2018-12-10 DIAGNOSIS — L85.3 XEROSIS CUTIS: ICD-10-CM

## 2018-12-10 PROBLEM — L02.92 FURUNCLE, UNSPECIFIED: Status: ACTIVE | Noted: 2018-12-10

## 2018-12-10 PROBLEM — L20.84 INTRINSIC (ALLERGIC) ECZEMA: Status: ACTIVE | Noted: 2018-12-10

## 2018-12-10 PROCEDURE — OTHER VENIPUNCTURE: OTHER

## 2018-12-10 PROCEDURE — 99214 OFFICE O/P EST MOD 30 MIN: CPT | Mod: 25

## 2018-12-10 PROCEDURE — OTHER COUNSELING: OTHER

## 2018-12-10 PROCEDURE — OTHER MIPS QUALITY: OTHER

## 2018-12-10 PROCEDURE — OTHER ORDER TESTS: OTHER

## 2018-12-10 PROCEDURE — 36415 COLL VENOUS BLD VENIPUNCTURE: CPT

## 2018-12-10 ASSESSMENT — LOCATION DETAILED DESCRIPTION DERM
LOCATION DETAILED: RIGHT ANTECUBITAL SKIN
LOCATION DETAILED: RIGHT LATERAL DISTAL PRETIBIAL REGION
LOCATION DETAILED: RIGHT ANTERIOR LATERAL PROXIMAL THIGH

## 2018-12-10 ASSESSMENT — LOCATION ZONE DERM
LOCATION ZONE: LEG
LOCATION ZONE: ARM

## 2018-12-10 ASSESSMENT — LOCATION SIMPLE DESCRIPTION DERM
LOCATION SIMPLE: RIGHT THIGH
LOCATION SIMPLE: RIGHT PRETIBIAL REGION
LOCATION SIMPLE: RIGHT ELBOW

## 2018-12-10 ASSESSMENT — ITCH INTENSITY: HOW SEVERE IS YOUR ITCHING?: 5

## 2018-12-10 ASSESSMENT — SEVERITY ASSESSMENT: SEVERITY: CLEAR

## 2018-12-10 NOTE — PROCEDURE: MIPS QUALITY
Quality 131: Pain Assessment And Follow-Up: Pain assessment using a standardized tool is documented as negative, no follow-up plan required
Quality 431: Preventive Care And Screening: Unhealthy Alcohol Use - Screening: Patient screened for unhealthy alcohol use using a single question and scores less than 2 times per year
Detail Level: Detailed
Quality 226: Preventive Care And Screening: Tobacco Use: Screening And Cessation Intervention: Patient screened for tobacco and is an ex-smoker
Quality 110: Preventive Care And Screening: Influenza Immunization: Influenza Immunization previously received during influenza season
Quality 130: Documentation Of Current Medications In The Medical Record: Current Medications Documented

## 2018-12-10 NOTE — PROCEDURE: VENIPUNCTURE
Bill For Individual Tests Below?: no
Venipuncture Paragraph: An alcohol pad was applied to the venipuncture site. Venipuncture was performed using a butterfly needle. Pressure and a bandaid was applied to the site. No complications were noted.
Detail Level: Detailed
Number Of Tubes Drawn: 3

## 2018-12-14 ENCOUNTER — OFFICE VISIT (OUTPATIENT)
Dept: FAMILY MEDICINE | Facility: CLINIC | Age: 83
End: 2018-12-14
Payer: COMMERCIAL

## 2018-12-14 VITALS
WEIGHT: 177 LBS | BODY MASS INDEX: 25.34 KG/M2 | HEIGHT: 70 IN | OXYGEN SATURATION: 94 % | DIASTOLIC BLOOD PRESSURE: 67 MMHG | HEART RATE: 90 BPM | TEMPERATURE: 97 F | SYSTOLIC BLOOD PRESSURE: 120 MMHG

## 2018-12-14 DIAGNOSIS — M54.2 NECK PAIN: Primary | ICD-10-CM

## 2018-12-14 PROCEDURE — 99213 OFFICE O/P EST LOW 20 MIN: CPT | Performed by: NURSE PRACTITIONER

## 2018-12-14 ASSESSMENT — MIFFLIN-ST. JEOR: SCORE: 1489.12

## 2018-12-14 NOTE — PROGRESS NOTES
HPI    SUBJECTIVE:   Hermilo Velasquez is a 86 year old male who presents to clinic today for the following health issues:      Neck Pain      Duration: One week    Description:  Location: Neck  Radiation: none    Intensity:  moderate    Accompanying signs and symptoms: No    History (similar episodes/previous evaluation): None    Precipitating or alleviating factors: None    Therapies tried and outcome: None      Stiff neck for 1 week. Woke up with it and kept getting worse  Really hard to lay down   Had shots in the neck over 10 years ago   No radicular pain or paresthesias     Past Medical History:   Diagnosis Date     Aortic stenosis     Severe AS, 9/2015 AVR - [facility] TRIFECTA Bovine bioprosthesis 25MM TF-25A     Atrial fibrillation (H)     9/2015 Paroxysmal post op Afib - discharged on Warfarin and a beta blocker     Deep vein thrombosis (H)      GERD (gastroesophageal reflux disease)      Heart murmur      Monoclonal gammopathy     plasmacyte prominent causing monoclonal gammopathy     Need for SBE (subacute bacterial endocarditis) prophylaxis      Neuropathy      Other and unspecified hyperlipidemia      Other malignant lymphomas     non hodgkin's lymphoma     RBBB (right bundle branch block)      Severe sepsis with acute organ dysfunction (H) 11/16/2015     Unspecified hereditary and idiopathic peripheral neuropathy      Family History   Problem Relation Age of Onset     C.A.D. Father      Emphysema Father      Melanoma No family hx of      Skin Cancer No family hx of      Past Surgical History:   Procedure Laterality Date     APPENDECTOMY       AS TOTAL KNEE ARTHROPLASTY       BACK SURGERY       ESOPHAGOSCOPY, GASTROSCOPY, DUODENOSCOPY (EGD), COMBINED N/A 11/28/2015    Procedure: COMBINED ESOPHAGOSCOPY, GASTROSCOPY, DUODENOSCOPY (EGD);  Surgeon: Danis Castillo MD;  Location:  GI     ESOPHAGOSCOPY, GASTROSCOPY, DUODENOSCOPY (EGD), COMBINED N/A 7/26/2018    Procedure: COMBINED ESOPHAGOSCOPY,  GASTROSCOPY, DUODENOSCOPY (EGD);;  Surgeon: Toby Dong DO;  Location:  GI     HERNIA REPAIR  2006     HERNIORRHAPHY VENTRAL  2013    Procedure: HERNIORRHAPHY VENTRAL;  VENTRAL HERNIA REPAIR WITH MESH;  Surgeon: Patel Guzman MD;  Location:  OR     KNEE SURGERY      arthroscopic right knee surgery      REPAIR LIGAMENT ANKLE  2012    Procedure:REPAIR LIGAMENT ANKLE; LEFT TARSAL TUNNEL RELEASE OF KNOT OF ARIEL RELEASE; Surgeon:SAUL PUENTE; Location: OR     REPLACE VALVE AORTIC N/A 9/3/2015    Procedure: REPLACE VALVE AORTIC;  Surgeon: Antonino Mitchell MD;  Location:  OR     ROTATOR CUFF REPAIR RT/LT      bilateral     SPINE SURGERY      3 spine surgeries     TONSILLECTOMY       TURP       Social History     Tobacco Use     Smoking status: Former Smoker     Packs/day: 2.00     Years: 55.00     Pack years: 110.00     Last attempt to quit: 1998     Years since quittin.9     Smokeless tobacco: Never Used   Substance Use Topics     Alcohol use: Yes     Alcohol/week: 0.0 oz     Comment: 1 drink per day     Current Outpatient Medications   Medication Sig Dispense Refill     acetaminophen (TYLENOL) 325 MG tablet Take 2 tablets (650 mg) by mouth every 4 hours as needed for mild pain 100 tablet      albuterol (PROAIR HFA/PROVENTIL HFA/VENTOLIN HFA) 108 (90 Base) MCG/ACT Inhaler Inhale 1-2 puffs into the lungs every 6 hours as needed for shortness of breath / dyspnea or wheezing 3 Inhaler 5     atorvastatin (LIPITOR) 10 MG tablet Take 1 tablet (10 mg) by mouth daily 90 tablet 3     camphor-menthol (DERMASARRA) 0.5-0.5 % LOTN Apply a thin layer to itchy areas as often as desired 444 mL 11     ferrous sulfate (IRON) 325 (65 Fe) MG tablet Take 1 tablet (325 mg) by mouth 2 times daily 180 tablet 3     hydrOXYzine (ATARAX) 25 MG tablet Take 1 to 2 tablets (25-50 mg) by mouth every 6 hours as needed for itching 120 tablet 1     loratadine (CLARITIN) 10 MG tablet Take  "10 mg by mouth daily       metoprolol succinate (TOPROL-XL) 100 MG 24 hr tablet Take 0.5 tablets (50 mg) by mouth daily 90 tablet 3     omeprazole (PRILOSEC) 20 MG CR capsule TAKE TWO CAPSULES BY MOUTH DAILY 180 capsule 0     ranitidine (ZANTAC) 300 MG tablet Take 1 tablet (300 mg) by mouth 2 times daily 180 tablet 3     tiotropium (SPIRIVA HANDIHALER) 18 MCG capsule Inhale 1 capsule (18 mcg) into the lungs daily 90 capsule 3     triamcinolone (KENALOG) 0.1 % cream Apply topically 2 times daily as needed for irritation       warfarin (COUMADIN) 5 MG tablet Take 1 tablet (5 mg) by mouth daily Or as directed by INR clinic 90 tablet 0     warfarin (COUMADIN) 5 MG tablet Take 1 tablet (5 mg) by mouth daily 90 tablet 1     WARFARIN SODIUM PO Take 5 mg by mouth daily       Allergies   Allergen Reactions     Gabapentin      Swelling       Lidocaine Blisters     Allergy to lidocaine ointment     Omeprazole Itching     Pantoprazole Itching     Prevacid [Lansoprazole] Itching     Lasix [Furosemide] Rash     Penicillins Rash     \"broke out from injection\" 60 yrs ago         Reviewed and updated as needed this visit by clinical staff and provider     ROS  Detailed as above     /67 (BP Location: Left arm, Patient Position: Sitting, Cuff Size: Adult Regular)   Pulse 90   Temp 97  F (36.1  C) (Oral)   Ht 1.778 m (5' 10\")   Wt 80.3 kg (177 lb)   SpO2 94%   BMI 25.40 kg/m        Physical Exam   Constitutional: He appears well-developed.   HENT:   Head: Normocephalic.   Eyes: Conjunctivae are normal.   Neck:   Severely limited ROM of neck in all axes  Significant muscle spasm of bilateral paraspinal muscles   Pulmonary/Chest: Effort normal.   Neurological: He is alert.   Skin: Skin is warm and dry.   Psychiatric: He has a normal mood and affect. Judgment normal.       Assessment and Plan:       ICD-10-CM    1. Neck pain M54.2 [facility] PT, HAND, AND CHIROPRACTIC REFERRAL       Recommend PT based on findings today. He needs to " work on mobility   Use heat and Tylenol prn   Call or rtc prn       Nelson Mao APRN, CNP  [facility]

## 2018-12-17 ENCOUNTER — ANTICOAGULATION THERAPY VISIT (OUTPATIENT)
Dept: NURSING | Facility: CLINIC | Age: 83
End: 2018-12-17
Payer: COMMERCIAL

## 2018-12-17 ENCOUNTER — OFFICE VISIT (OUTPATIENT)
Dept: FAMILY MEDICINE | Facility: CLINIC | Age: 83
End: 2018-12-17
Payer: COMMERCIAL

## 2018-12-17 VITALS
SYSTOLIC BLOOD PRESSURE: 136 MMHG | OXYGEN SATURATION: 93 % | TEMPERATURE: 97.3 F | DIASTOLIC BLOOD PRESSURE: 76 MMHG | BODY MASS INDEX: 25.34 KG/M2 | HEIGHT: 70 IN | HEART RATE: 81 BPM | WEIGHT: 177 LBS

## 2018-12-17 DIAGNOSIS — M54.2 NECK PAIN: Primary | ICD-10-CM

## 2018-12-17 LAB — INR POINT OF CARE: 4.8 (ref 0.86–1.14)

## 2018-12-17 PROCEDURE — 85610 PROTHROMBIN TIME: CPT | Mod: QW

## 2018-12-17 PROCEDURE — 99213 OFFICE O/P EST LOW 20 MIN: CPT | Performed by: INTERNAL MEDICINE

## 2018-12-17 PROCEDURE — 36416 COLLJ CAPILLARY BLOOD SPEC: CPT

## 2018-12-17 ASSESSMENT — MIFFLIN-ST. JEOR: SCORE: 1489.12

## 2018-12-17 NOTE — PROGRESS NOTES
SUBJECTIVE:   Hermilo Velasquez is a 86 year old male who presents to clinic today for the following health issues:      Chief Complaint   Patient presents with     Follow Up     for Neck Pain         HPI:   Patient Hermilo Velasquez is a very pleasant 86 year old male with history of mechanical neck pains who presents to Internal Medicine clinic today for evaluation of poorly controlled chronic mechanical neck pains. Regarding the patient's neck pains, the patient denies any recnet new trauma, injuries or falls. He is requesting a referral for possible PT and/or chiropractic therapy. He denies any chest pain, headaches, fever or chills.        Current Medications:     Current Outpatient Medications   Medication Sig Dispense Refill     acetaminophen (TYLENOL) 325 MG tablet Take 2 tablets (650 mg) by mouth every 4 hours as needed for mild pain 100 tablet      albuterol (PROAIR HFA/PROVENTIL HFA/VENTOLIN HFA) 108 (90 Base) MCG/ACT Inhaler Inhale 1-2 puffs into the lungs every 6 hours as needed for shortness of breath / dyspnea or wheezing 3 Inhaler 5     atorvastatin (LIPITOR) 10 MG tablet Take 1 tablet (10 mg) by mouth daily 90 tablet 3     camphor-menthol (DERMASARRA) 0.5-0.5 % LOTN Apply a thin layer to itchy areas as often as desired 444 mL 11     ferrous sulfate (IRON) 325 (65 Fe) MG tablet Take 1 tablet (325 mg) by mouth 2 times daily 180 tablet 3     hydrOXYzine (ATARAX) 25 MG tablet Take 1 to 2 tablets (25-50 mg) by mouth every 6 hours as needed for itching 120 tablet 1     loratadine (CLARITIN) 10 MG tablet Take 10 mg by mouth daily       metoprolol succinate (TOPROL-XL) 100 MG 24 hr tablet Take 0.5 tablets (50 mg) by mouth daily 90 tablet 3     omeprazole (PRILOSEC) 20 MG CR capsule TAKE TWO CAPSULES BY MOUTH DAILY 180 capsule 0     ranitidine (ZANTAC) 300 MG tablet Take 1 tablet (300 mg) by mouth 2 times daily 180 tablet 3     tiotropium (SPIRIVA HANDIHALER) 18 MCG capsule Inhale 1 capsule (18 mcg) into  "the lungs daily 90 capsule 3     triamcinolone (KENALOG) 0.1 % cream Apply topically 2 times daily as needed for irritation       warfarin (COUMADIN) 5 MG tablet Take 1 tablet (5 mg) by mouth daily Or as directed by INR clinic 90 tablet 0     warfarin (COUMADIN) 5 MG tablet Take 1 tablet (5 mg) by mouth daily 90 tablet 1         Allergies:      Allergies   Allergen Reactions     Gabapentin      Swelling       Lidocaine Blisters     Allergy to lidocaine ointment     Omeprazole Itching     Pantoprazole Itching     Prevacid [Lansoprazole] Itching     Lasix [Furosemide] Rash     Penicillins Rash     \"broke out from injection\" 60 yrs ago              Past Medical History:     Past Medical History:   Diagnosis Date     Aortic stenosis     Severe AS, 9/2015 AVR - [facility] TRIFECTA Bovine bioprosthesis 25MM TF-25A     Atrial fibrillation (H)     9/2015 Paroxysmal post op Afib - discharged on Warfarin and a beta blocker     Deep vein thrombosis (H)      GERD (gastroesophageal reflux disease)      Heart murmur      Monoclonal gammopathy     plasmacyte prominent causing monoclonal gammopathy     Need for SBE (subacute bacterial endocarditis) prophylaxis      Neuropathy      Other and unspecified hyperlipidemia      Other malignant lymphomas     non hodgkin's lymphoma     RBBB (right bundle branch block)      Severe sepsis with acute organ dysfunction (H) 11/16/2015     Unspecified hereditary and idiopathic peripheral neuropathy          Past Surgical History:     Past Surgical History:   Procedure Laterality Date     APPENDECTOMY       AS TOTAL KNEE ARTHROPLASTY       BACK SURGERY       ESOPHAGOSCOPY, GASTROSCOPY, DUODENOSCOPY (EGD), COMBINED N/A 11/28/2015    Procedure: COMBINED ESOPHAGOSCOPY, GASTROSCOPY, DUODENOSCOPY (EGD);  Surgeon: Danis Casitllo MD;  Location: [facility]     ESOPHAGOSCOPY, GASTROSCOPY, DUODENOSCOPY (EGD), COMBINED N/A 7/26/2018    Procedure: COMBINED ESOPHAGOSCOPY, GASTROSCOPY, DUODENOSCOPY (EGD);;  " Surgeon: Toby Dong DO;  Location:  GI     HERNIA REPAIR  2006     HERNIORRHAPHY VENTRAL  2013    Procedure: HERNIORRHAPHY VENTRAL;  VENTRAL HERNIA REPAIR WITH MESH;  Surgeon: Patel Guzman MD;  Location:  OR     KNEE SURGERY      arthroscopic right knee surgery      REPAIR LIGAMENT ANKLE  2012    Procedure:REPAIR LIGAMENT ANKLE; LEFT TARSAL TUNNEL RELEASE OF KNOT OF ARIEL RELEASE; Surgeon:SAUL PUENTE; Location: OR     REPLACE VALVE AORTIC N/A 9/3/2015    Procedure: REPLACE VALVE AORTIC;  Surgeon: Antonino Mitchell MD;  Location:  OR     ROTATOR CUFF REPAIR RT/LT      bilateral     SPINE SURGERY      3 spine surgeries     TONSILLECTOMY       TURP           Family Medical History:     Family History   Problem Relation Age of Onset     C.A.D. Father      Emphysema Father      Melanoma No family hx of      Skin Cancer No family hx of          Social History:     Social History     Socioeconomic History     Marital status:      Spouse name: Not on file     Number of children: Not on file     Years of education: Not on file     Highest education level: Not on file   Social Needs     Financial resource strain: Not on file     Food insecurity - worry: Not on file     Food insecurity - inability: Not on file     Transportation needs - medical: Not on file     Transportation needs - non-medical: Not on file   Occupational History     Not on file   Tobacco Use     Smoking status: Former Smoker     Packs/day: 2.00     Years: 55.00     Pack years: 110.00     Last attempt to quit: 1998     Years since quittin.9     Smokeless tobacco: Never Used   Substance and Sexual Activity     Alcohol use: Yes     Alcohol/week: 0.0 oz     Comment: 1 drink per day     Drug use: No     Sexual activity: Not Currently     Partners: Female   Other Topics Concern     Parent/sibling w/ CABG, MI or angioplasty before 65F 55M? Not Asked      Service Not Asked     Blood  "Transfusions Not Asked     Caffeine Concern No     Comment: occ     Occupational Exposure Not Asked     Hobby Hazards Not Asked     Sleep Concern Not Asked     Stress Concern Not Asked     Weight Concern Not Asked     Special Diet No     Back Care Not Asked     Exercise No     Bike Helmet Not Asked     Seat Belt Yes     Self-Exams Not Asked   Social History Narrative    , [place]    Retired  - self employed           Review of System:     Constitutional: Negative for fever or chills  Skin: Negative for rashes  Ears/Nose/Throat: Negative for nasal congestion, sore throat  Respiratory: No shortness of breath, dyspnea on exertion, cough, or hemoptysis  Cardiovascular: Negative for chest pain  Gastrointestinal: Negative for nausea, vomiting  Genitourinary: Negative for dysuria, hematuria  Musculoskeletal: Positive for mechanical neck pains  Neurologic: Negative for headaches  Psychiatric: Negative for depression, anxiety  Hematologic/Lymphatic/Immunologic: Negative  Endocrine: Negative  Behavioral: Negative for tobacco use       Physical Exam:   /76 (BP Location: Left arm, Patient Position: Sitting, Cuff Size: Adult Regular)   Pulse 81   Temp 97.3  F (36.3  C) (Oral)   Ht 1.778 m (5' 10\")   Wt 80.3 kg (177 lb)   SpO2 93%   BMI 25.40 kg/m      GENERAL: alert and no distress  EYES: eyes grossly normal to inspection, and conjunctivae and sclerae normal  HENT: Normocephalic atraumatic. Nose and mouth without ulcers or lesions  NECK: supple  RESP: lungs clear to auscultation   CV: regular rate and rhythm, normal S1 S2  LYMPH: no peripheral edema   ABDOMEN: nondistended  MS: mechanical neck pains noted without any signs of trauma, injuries  SKIN: no suspicious lesions or rashes  NEURO: Alert & Oriented x 3.   PSYCH: mentation appears normal, affect normal        Diagnostic Test Results:     Diagnostic Test Results:  Results for orders placed or performed in visit on " 11/20/18   INR point of care   Result Value Ref Range    INR Protime 2.9 (A) 0.86 - 1.14       ASSESSMENT/PLAN:       (M54.2) Neck pain  (primary encounter diagnosis)  Comment: chronic mechanical neck pains not well controlled, without any new trauma or injuries or falls  Plan: [facility] PT, HAND, AND CHIROPRACTIC REFERRAL      Follow Up Plan:     Patient is instructed to return to Internal Medicine clinic for follow-up visit in 1 month.        Liv Mc MD  Internal Medicine  [facility]

## 2018-12-17 NOTE — PROGRESS NOTES
ANTICOAGULATION FOLLOW-UP CLINIC VISIT    Patient Name:  Hermilo Velasquez  Date:  2018  Contact Type:  Face to Face    SUBJECTIVE:     Patient Findings     Positives:   Extra doses           OBJECTIVE    INR Protime   Date Value Ref Range Status   2018 4.8 (A) 0.86 - 1.14 Final       ASSESSMENT / PLAN  INR assessment SUPRA    Recheck INR In: 3 DAYS    INR Location Clinic      Anticoagulation Summary  As of 2018    INR goal:   2.0-3.0   TTR:   47.6 % (2.8 y)   INR used for dosin.8! (2018)   Warfarin maintenance plan:   7.5 mg (5 mg x 1.5) every Wed, Sat; 5 mg (5 mg x 1) all other days   Full warfarin instructions:   : Hold; : 2.5 mg; : 2.5 mg; Otherwise 7.5 mg every Wed, Sat; 5 mg all other days   Weekly warfarin total:   40 mg   Weekly max warfarin dose:   45 mg   Plan last modified:   Dione Giron RN (10/31/2018)   Next INR check:   2018   Target end date:   Indefinite    Indications    Long-term (current) use of anticoagulants [Z79.01] [Z79.01]  Pulmonary embolism  bilateral (H) [I26.99]  Paroxysmal atrial fibrillation (H) [I48.0]             Anticoagulation Episode Summary     INR check location:   [facility]    Preferred lab:       Send INR reminders to:   [facility]AG[facility]    Comments:         Anticoagulation Care Providers     Provider Role Specialty Phone number    Karson Bishop MD [facility] Internal Medicine [phone]            See the Encounter Report to view Anticoagulation Flowsheet and Dosing Calendar (Go to Encounters tab in chart review, and find the Anticoagulation Therapy Visit)    Pt is 4.8 today. Pt reports that he may have gotten mixed up on Saturday 12/15/18. Pt thinks that her took his 7.5 mg then forgot if he took it so he took an extra 2.5 mg which equals 10 mg. Pt also started on a new allergy medication BID X 2 weeks. Pt states that his dermatologist told him to do this. Pt advised to HOLD his warfarin tonight 18 and  take 2.5 mg on Tuesday 12/18/18 and 2.5 mg on Wednesday 12/19/18. Recheck on Thursday 12/20/18.  Nomi aware if signs of clotting (pain, tenderness, swelling, color change in leg or arm, SOB) and bleeding occur (blood in stool, urine, large bruising, bleeding gums, nosebleeds) to have INR check sooner. If sx severe report to ER or concerned for stroke call 911. If general questions or concerns arise, call clinic.         Mesha Bah RN

## 2018-12-18 ENCOUNTER — THERAPY VISIT (OUTPATIENT)
Dept: PHYSICAL THERAPY | Facility: CLINIC | Age: 83
End: 2018-12-18
Payer: COMMERCIAL

## 2018-12-18 DIAGNOSIS — M43.6 NECK STIFFNESS: ICD-10-CM

## 2018-12-18 DIAGNOSIS — M54.2 CERVICAL PAIN: ICD-10-CM

## 2018-12-18 PROCEDURE — G8982 BODY POS GOAL STATUS: HCPCS | Mod: GP | Performed by: PHYSICAL THERAPIST

## 2018-12-18 PROCEDURE — G8981 BODY POS CURRENT STATUS: HCPCS | Mod: GP | Performed by: PHYSICAL THERAPIST

## 2018-12-18 PROCEDURE — 97161 PT EVAL LOW COMPLEX 20 MIN: CPT | Mod: GP | Performed by: PHYSICAL THERAPIST

## 2018-12-18 PROCEDURE — 97140 MANUAL THERAPY 1/> REGIONS: CPT | Mod: GP | Performed by: PHYSICAL THERAPIST

## 2018-12-18 NOTE — PROGRESS NOTES
[facility] Initial Evaluation  Subjective:Hermilo Velasquez is a 86 year old male.    Patient s chief complaints: neck pain on both sides of neck. Awoke with the pain after sleeping with 2 pillows due to acid reflux.  Neuropathy in both feet. 2 to 3 operation in low back.   Condition occurred due to sleeping incorrectly.  Date of Onset: 12/14/18  Location of symptoms is neck .  Symptoms other than pain include: sharp, clicking,    Quality of pain is sharp, aching, throbbing and frequency is constant.    Pain dependence on time of day is: no.   Pain rating is: 7/10.    Symptoms are exacerbated by: movement.    Symptoms are relieved by:  Pain pills with codeine.    Progression of symptoms is that symptoms are:  Thinks he is turning his neck better.  Imaging/Special tests include: none   Previous treatments include: chiropractic many years ago.   Patient reports that general health is: good.   Pertinent medical history includes:  Numbness/tingling feet due to long standing neuropathy.    Medical allergies includes: penicillin.   Surgical history includes: low back surgery many years ago - no hardware.  Current medications include: muscle relaxant and tylenol  Current occupation is: retired from [employer]  Work status is: retired  Primary job tasks include: none  Barriers include: non  Red flags include: none    Patient's expectations for therapy include: Be able to perform everyday activities without neck pain    HPI                    Objective:  System              Cervical/Thoracic Evaluation    AROM:  AROM Cervical:    Flexion:            30 pulling on right  Extension:       40  Rotation:         Left: 20 degrees pulling on right     Right: 20 degrees pain middle of neck  Side Bend:      Left: 15 pulling on right     Right:  20 pulling on left  AROM Thoracic:    Flexion:              Extension:           Rotation:            Left: 10 degrees      Right: 10 degrees      Headaches:  none  Cervical Myotomes:    C1-2 (Neck Flex): Left:  5    Right: 5  C3 (neck side bend): Left: 5    Right: 5  C4 (shrug):  Left: 5    Right: 5  C5 (Deltoid):  Left: 3    Right: 5  C6 (Biceps):  Left: 5    Right: 3  C7 (Triceps):  Left: 5    Right: 5  C8 (Thumb Ext): Left: 5    Right: 5  T1 (Intrinsics): Left: 5    Right: 5    Neural Tension:    Left side:  Radial; Ulnar and Median positive.  Right side:  Radial; Ulnar and Median positive.    Cervical Palpation:    Tenderness present at Left:    Sternocleidomstoid; Scalenes; Rhomboids; Upper Trap; Levator; Erector Spinae and Suboccipitals  Tenderness present at Right:    Sternocleidomstoid; Scalenes; Rhomboids; Upper Trap; Levator; Erector Spinae and Suboccipitals      Spinal Segmental Conclusions:  C3,4,5 rotated to the right.                                                  General     ROS    Assessment/Plan:    Patient is a 86 year old male with cervical complaints.    Patient has the following significant findings with corresponding treatment plan.                Diagnosis 1:  Cervical pain  Pain -  hot/cold therapy, manual therapy, self management, education and home program  Decreased ROM/flexibility - manual therapy, therapeutic exercise, therapeutic activity and home program  Decreased strength - therapeutic exercise, therapeutic activities and home program  Impaired posture - neuro re-education, therapeutic activities and home program    Therapy Evaluation Codes:   1) History comprised of:   Personal factors that impact the plan of care:      Past/current experiences and Time since onset of symptoms.    Comorbidity factors that impact the plan of care are:      None.     Medications impacting care: Muscle relaxant.  2) Examination of Body Systems comprised of:   Body structures and functions that impact the plan of care:      Cervical spine and Lumbar spine.   Activity limitations that impact the plan of care are:      Driving, Lifting, Reading/Computer  work, Sports, Working and Sleeping.  3) Clinical presentation characteristics are:   Stable/Uncomplicated.  4) Decision-Making    Low complexity using standardized patient assessment instrument and/or measureable assessment of functional outcome.  Cumulative Therapy Evaluation is: Low complexity.    Previous and current functional limitations:  (See Goal Flow Sheet for this information)    Short term and Long term goals: (See Goal Flow Sheet for this information)     Communication ability:  Patient appears to be able to clearly communicate and understand verbal and written communication and follow directions correctly.  Treatment Explanation - The following has been discussed with the patient:   RX ordered/plan of care  Anticipated outcomes  Possible risks and side effects  This patient would benefit from PT intervention to resume normal activities.   Rehab potential is excellent.    Frequency:  1 X week, once daily  Duration:  for 8 weeks  Discharge Plan:  Achieve all LTG.  Independent in home treatment program.  Reach maximal therapeutic benefit.    Please refer to the daily flowsheet for treatment today, total treatment time and time spent performing 1:1 timed codes.

## 2018-12-20 ENCOUNTER — ANTICOAGULATION THERAPY VISIT (OUTPATIENT)
Dept: NURSING | Facility: CLINIC | Age: 83
End: 2018-12-20
Payer: COMMERCIAL

## 2018-12-20 DIAGNOSIS — I26.99 PULMONARY EMBOLISM, BILATERAL (H): ICD-10-CM

## 2018-12-20 DIAGNOSIS — I48.0 PAROXYSMAL ATRIAL FIBRILLATION (H): ICD-10-CM

## 2018-12-20 LAB — INR POINT OF CARE: 2.5 (ref 0.86–1.14)

## 2018-12-20 PROCEDURE — 85610 PROTHROMBIN TIME: CPT | Mod: QW

## 2018-12-20 PROCEDURE — 99207 ZZC NO CHARGE NURSE ONLY: CPT

## 2018-12-20 PROCEDURE — 36416 COLLJ CAPILLARY BLOOD SPEC: CPT

## 2018-12-20 NOTE — PROGRESS NOTES
ANTICOAGULATION FOLLOW-UP CLINIC VISIT    Patient Name:  Hermilo Velasquez  Date:  2018  Contact Type:  Face to Face    SUBJECTIVE:     Patient Findings     Positives:   No Problem Findings           OBJECTIVE    INR Protime   Date Value Ref Range Status   2018 2.5 (A) 0.86 - 1.14 Final       ASSESSMENT / PLAN  INR assessment THER    Recheck INR In: 3 WEEKS    INR Location Clinic      Anticoagulation Summary  As of 2018    INR goal:   2.0-3.0   TTR:   47.5 % (2.8 y)   INR used for dosin.5 (2018)   Warfarin maintenance plan:   7.5 mg (5 mg x 1.5) every Wed, Sat; 5 mg (5 mg x 1) all other days   Full warfarin instructions:   7.5 mg every Wed, Sat; 5 mg all other days   Weekly warfarin total:   40 mg   Weekly max warfarin dose:   45 mg   No change documented:   Evie Mckeon RN   Plan last modified:   Dione Giron RN (10/31/2018)   Next INR check:   2019   Target end date:   Indefinite    Indications    Long-term (current) use of anticoagulants [Z79.01] [Z79.01]  Pulmonary embolism  bilateral (H) [I26.99]  Paroxysmal atrial fibrillation (H) [I48.0]             Anticoagulation Episode Summary     INR check location:   [facility]    Preferred lab:       Send INR reminders to:   [facility] ANT[facility]AG[facility]    Comments:         Anticoagulation Care Providers     Provider Role Specialty Phone number    Karson Bishop MD [facility] Internal Medicine [phone]            See the Encounter Report to view Anticoagulation Flowsheet and Dosing Calendar (Go to Encounters tab in chart review, and find the Anticoagulation Therapy Visit)    Dosage adjustment made based on physician directed care plan.    Evie Mckeon, RN

## 2018-12-26 ENCOUNTER — THERAPY VISIT (OUTPATIENT)
Dept: PHYSICAL THERAPY | Facility: CLINIC | Age: 83
End: 2018-12-26
Payer: COMMERCIAL

## 2018-12-26 DIAGNOSIS — M54.2 NECK PAIN: ICD-10-CM

## 2018-12-26 DIAGNOSIS — M43.6 NECK STIFFNESS: ICD-10-CM

## 2018-12-26 DIAGNOSIS — M54.2 CERVICAL PAIN: ICD-10-CM

## 2018-12-26 PROCEDURE — 97035 APP MDLTY 1+ULTRASOUND EA 15: CPT | Mod: GP | Performed by: PHYSICAL THERAPIST

## 2018-12-26 PROCEDURE — 97140 MANUAL THERAPY 1/> REGIONS: CPT | Mod: GP | Performed by: PHYSICAL THERAPIST

## 2018-12-26 PROCEDURE — 97110 THERAPEUTIC EXERCISES: CPT | Mod: GP | Performed by: PHYSICAL THERAPIST

## 2018-12-31 DIAGNOSIS — L29.9 CHRONIC PRURITUS: ICD-10-CM

## 2018-12-31 RX ORDER — HYDROXYZINE HYDROCHLORIDE 25 MG/1
TABLET, FILM COATED ORAL
Qty: 120 TABLET | Refills: 0 | Status: SHIPPED | OUTPATIENT
Start: 2018-12-31 | End: 2019-02-18

## 2019-01-03 ENCOUNTER — TELEPHONE (OUTPATIENT)
Dept: FAMILY MEDICINE | Facility: CLINIC | Age: 84
End: 2019-01-03

## 2019-01-03 NOTE — TELEPHONE ENCOUNTER
Reason for Call: appointment    Detailed comments: PT has apt for 1/10 @4:30 for cortisone shot- neck- is looking for sooner apt please     Phone Number Patient can be reached at: Home number on file [phone] (home)    Best Time: Any     Can we leave a detailed message on this number? YES    Call taken on 1/3/2019 at 11:51 AM by Ailin Canales

## 2019-01-05 NOTE — TELEPHONE ENCOUNTER
He did see Marcio, and Dr. Mc for neck pain, he was referred to physical therapy, I do not see any neck imaging, since I have not clinically evaluated this problem, I am not even certain that an injection is the appropriate treatment particularly since I do not see any imaging results.  The best I can do for this patient is after him same-day slot to see me to evaluate his neck pain.  If the RN could please triage for any dangerous neurological symptoms that might require more prompt evaluation that would be most appropriate.

## 2019-01-07 NOTE — TELEPHONE ENCOUNTER
"Spoke to patient.  He had 2 PT sessions (most recent one week ago).  \"Could have more sessions, but they don't really do much\".Advised that sometimes 1-2 sessions is not enough, unless he is carrying out the exercises recommended. He agrees.    States he had \"imaging\" about 10 years ago. Realizes he is due for more evaluation.  He is not expecting a cortisone shot in office. \"I know I'd have to go to a radiologist for that.\"      He denies any associated symptoms with his neck \"stiffness\".  Feels it started when switched to TWO pillows to elevate his head a few weeks/month ago to alleviate GI/reflux symptoms.    No arm, shoulder, facial tingling or numbness.  No visual changes. Denies weakness, dizziness, other.  No change in his ADL's.      Prefers to keep OV with PCP on 1/10/19.  Rescheduled INR visit to coordinate same day appts.  He is appreciative of call.   Analisa Vigil RN    "

## 2019-01-10 ENCOUNTER — OFFICE VISIT (OUTPATIENT)
Dept: FAMILY MEDICINE | Facility: CLINIC | Age: 84
End: 2019-01-10
Payer: COMMERCIAL

## 2019-01-10 ENCOUNTER — ANCILLARY PROCEDURE (OUTPATIENT)
Dept: GENERAL RADIOLOGY | Facility: CLINIC | Age: 84
End: 2019-01-10
Payer: COMMERCIAL

## 2019-01-10 ENCOUNTER — ANTICOAGULATION THERAPY VISIT (OUTPATIENT)
Dept: NURSING | Facility: CLINIC | Age: 84
End: 2019-01-10
Payer: COMMERCIAL

## 2019-01-10 VITALS
TEMPERATURE: 97 F | HEIGHT: 70 IN | BODY MASS INDEX: 25.62 KG/M2 | HEART RATE: 48 BPM | DIASTOLIC BLOOD PRESSURE: 68 MMHG | SYSTOLIC BLOOD PRESSURE: 114 MMHG | WEIGHT: 179 LBS | OXYGEN SATURATION: 95 %

## 2019-01-10 DIAGNOSIS — I48.0 PAROXYSMAL ATRIAL FIBRILLATION (H): ICD-10-CM

## 2019-01-10 DIAGNOSIS — M17.11 PRIMARY OSTEOARTHRITIS OF RIGHT KNEE: ICD-10-CM

## 2019-01-10 DIAGNOSIS — M54.2 NECK PAIN: Primary | ICD-10-CM

## 2019-01-10 DIAGNOSIS — I26.99 PULMONARY EMBOLISM, BILATERAL (H): ICD-10-CM

## 2019-01-10 LAB
ERYTHROCYTE [SEDIMENTATION RATE] IN BLOOD BY WESTERGREN METHOD: 33 MM/H (ref 0–20)
INR POINT OF CARE: 4.1 (ref 0.86–1.14)

## 2019-01-10 PROCEDURE — 85610 PROTHROMBIN TIME: CPT | Mod: QW

## 2019-01-10 PROCEDURE — 20610 DRAIN/INJ JOINT/BURSA W/O US: CPT | Performed by: INTERNAL MEDICINE

## 2019-01-10 PROCEDURE — 85652 RBC SED RATE AUTOMATED: CPT | Performed by: INTERNAL MEDICINE

## 2019-01-10 PROCEDURE — 72040 X-RAY EXAM NECK SPINE 2-3 VW: CPT

## 2019-01-10 PROCEDURE — 36416 COLLJ CAPILLARY BLOOD SPEC: CPT

## 2019-01-10 PROCEDURE — 99213 OFFICE O/P EST LOW 20 MIN: CPT | Mod: 25 | Performed by: INTERNAL MEDICINE

## 2019-01-10 PROCEDURE — 99207 ZZC NO CHARGE NURSE ONLY: CPT

## 2019-01-10 RX ORDER — LIDOCAINE HYDROCHLORIDE 10 MG/ML
1 INJECTION, SOLUTION INFILTRATION; PERINEURAL ONCE
Status: DISCONTINUED | OUTPATIENT
Start: 2019-01-10 | End: 2019-03-26

## 2019-01-10 RX ORDER — TRIAMCINOLONE ACETONIDE 40 MG/ML
40 INJECTION, SUSPENSION INTRA-ARTICULAR; INTRAMUSCULAR ONCE
Status: DISCONTINUED | OUTPATIENT
Start: 2019-01-10 | End: 2019-03-26

## 2019-01-10 ASSESSMENT — MIFFLIN-ST. JEOR: SCORE: 1498.19

## 2019-01-10 NOTE — PROGRESS NOTES
SUBJECTIVE:   Hermilo Velasquez is a 86 year old male who presents to clinic today for the following health issues:      Stiff neck ongoing for 2 weeks, decreased range of motion  Possible knee injection    This is a pleasant 86-year-old man with multiple medical problems including atrial fibrillation, history of pulmonary embolism, COPD, lymphoma.  He is a non-smoker.  He has osteoarthritis of the left knee status post replacement and recent troubles with osteoarthritis pain in the right knee.  He has derived significant symptom relief with cortisone injections as recently as 3 months ago.  However, his right knee symptoms have reoccurred over the last several weeks.  The symptoms are now severe and he requests another cortisone injection in the right knee.  He does not want to consider right knee total joint arthroplasty.  Also, he complains of a several week history of pain in his neck.  He describes the pain as a stiffness that is worse in the mornings and improves as the day passes.  The pain does not radiate down his arms and there is no new arm numbness or weakness.  There is no significant antecedent injury or trauma.  He did have a MRI of the cervical spine in 2017 demonstrating severe and extensive degenerative changes in the spine but no focal disc herniations.  He does describe what sounds like epidural steroid injections in the cervical spine that occurred over 10 years ago.    Problem list and histories reviewed & adjusted, as indicated.  Additional history: as documented    Patient Active Problem List   Diagnosis     Low back pain     Ventral hernia     Nonrheumatic aortic valve stenosis     GERD (gastroesophageal reflux disease)     Non Hodgkin's lymphoma (H): 2013     Microscopic hematuria     Health Care Home     CARDIOVASCULAR SCREENING; LDL GOAL LESS THAN 130     History of colonic polyps     Neuropathy     Anemia due to blood loss, acute     Physical deconditioning     Paroxysmal atrial  fibrillation (H)     Need for SBE (subacute bacterial endocarditis) prophylaxis     RBBB (right bundle branch block)     Gastrointestinal hemorrhage with melena     Primary osteoarthritis of both knees     Primary osteoarthritis of left hip     Pulmonary embolism, bilateral (H)     S/P IVC filter     Long-term (current) use of anticoagulants [Z79.01]     Benign neoplasm of colon, unspecified part of colon     Pulmonary nodules     Benign neoplasm of colon     Primary osteoarthritis of left knee     Essential hypertension with goal blood pressure less than 140/90     Non-Hodgkin's lymphoma, unspecified body region, unspecified non-Hodgkin lymphoma type (H)     Anticoagulated on Coumadin     Other chronic pulmonary embolism without acute cor pulmonale (H)     Epistaxis     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     Aftercare following left knee joint replacement surgery     Rash     Drainage from wound: left Knee     Lower extremity edema     Leg edema, left     S/P total knee arthroplasty     ACP (advance care planning)     Hyperlipidemia LDL goal <130     Chronic obstructive pulmonary disease, unspecified COPD type (H)     Iron deficiency anemia due to chronic blood loss     Cervical pain     Neck stiffness     Past Surgical History:   Procedure Laterality Date     APPENDECTOMY       AS TOTAL KNEE ARTHROPLASTY       BACK SURGERY       ESOPHAGOSCOPY, GASTROSCOPY, DUODENOSCOPY (EGD), COMBINED N/A 11/28/2015    Procedure: COMBINED ESOPHAGOSCOPY, GASTROSCOPY, DUODENOSCOPY (EGD);  Surgeon: Danis Castillo MD;  Location:  GI     ESOPHAGOSCOPY, GASTROSCOPY, DUODENOSCOPY (EGD), COMBINED N/A 7/26/2018    Procedure: COMBINED ESOPHAGOSCOPY, GASTROSCOPY, DUODENOSCOPY (EGD);;  Surgeon: Toby oDng DO;  Location:  GI     HERNIA REPAIR  2006     HERNIORRHAPHY VENTRAL  4/17/2013    Procedure: HERNIORRHAPHY VENTRAL;  VENTRAL HERNIA REPAIR WITH MESH;  Surgeon: Patel Guzman MD;  Location:  OR      KNEE SURGERY      arthroscopic right knee surgery      REPAIR LIGAMENT ANKLE  2012    Procedure:REPAIR LIGAMENT ANKLE; LEFT TARSAL TUNNEL RELEASE OF KNOT OF ARIEL RELEASE; Surgeon:SAUL PUENTE; Location:[facility] OR     REPLACE VALVE AORTIC N/A 9/3/2015    Procedure: REPLACE VALVE AORTIC;  Surgeon: Antonino Mitchell MD;  Location: [facility] OR     ROTATOR CUFF REPAIR RT/LT      bilateral     SPINE SURGERY      3 spine surgeries     TONSILLECTOMY       TURP         Social History     Tobacco Use     Smoking status: Former Smoker     Packs/day: 2.00     Years: 55.00     Pack years: 110.00     Last attempt to quit: 1998     Years since quittin.9     Smokeless tobacco: Never Used   Substance Use Topics     Alcohol use: Yes     Alcohol/week: 0.0 oz     Comment: 1 drink per day     Family History   Problem Relation Age of Onset     C.A.D. Father      Emphysema Father      Melanoma No family hx of      Skin Cancer No family hx of          Current Outpatient Medications   Medication Sig Dispense Refill     acetaminophen (TYLENOL) 325 MG tablet Take 2 tablets (650 mg) by mouth every 4 hours as needed for mild pain 100 tablet      albuterol (PROAIR HFA/PROVENTIL HFA/VENTOLIN HFA) 108 (90 Base) MCG/ACT Inhaler Inhale 1-2 puffs into the lungs every 6 hours as needed for shortness of breath / dyspnea or wheezing 3 Inhaler 5     atorvastatin (LIPITOR) 10 MG tablet Take 1 tablet (10 mg) by mouth daily 90 tablet 3     camphor-menthol (DERMASARRA) 0.5-0.5 % LOTN Apply a thin layer to itchy areas as often as desired 444 mL 11     ferrous sulfate (IRON) 325 (65 Fe) MG tablet Take 1 tablet (325 mg) by mouth 2 times daily 180 tablet 3     hydrOXYzine (ATARAX) 25 MG tablet Take 1 to 2 tablets (25-50 mg) by mouth every 6 hours as needed for itching  120 tablet 0     loratadine (CLARITIN) 10 MG tablet Take 10 mg by mouth daily       metoprolol succinate (TOPROL-XL) 100 MG 24 hr tablet Take 0.5 tablets (50 mg) by mouth  "daily 90 tablet 3     omeprazole (PRILOSEC) 20 MG CR capsule TAKE TWO CAPSULES BY MOUTH DAILY 180 capsule 0     ranitidine (ZANTAC) 300 MG tablet Take 1 tablet (300 mg) by mouth 2 times daily 180 tablet 3     tiotropium (SPIRIVA HANDIHALER) 18 MCG capsule Inhale 1 capsule (18 mcg) into the lungs daily 90 capsule 3     triamcinolone (KENALOG) 0.1 % cream Apply topically 2 times daily as needed for irritation       warfarin (COUMADIN) 5 MG tablet Take 1 tablet (5 mg) by mouth daily Or as directed by INR clinic 90 tablet 0     warfarin (COUMADIN) 5 MG tablet Take 1 tablet (5 mg) by mouth daily 90 tablet 1     Allergies   Allergen Reactions     Gabapentin      Swelling       Lidocaine Blisters     Allergy to lidocaine ointment     Omeprazole Itching     Pantoprazole Itching     Prevacid [Lansoprazole] Itching     Lasix [Furosemide] Rash     Penicillins Rash     \"broke out from injection\" 60 yrs ago         Reviewed and updated as needed this visit by clinical staff       Reviewed and updated as needed this visit by Provider         ROS:  He denies jaw claudication or vision change    OBJECTIVE:     /68 (BP Location: Left arm, Patient Position: Sitting, Cuff Size: Adult Regular)   Pulse (!) 48   Temp 97  F (36.1  C) (Oral)   Ht 1.778 m (5' 10\")   Wt 81.2 kg (179 lb)   SpO2 95%   BMI 25.68 kg/m    Body mass index is 25.68 kg/m .  General: This is a well-appearing man in no acute distress.  He appears quite comfortable at rest.  Neck: There is no midline cervical spine tenderness, there is full passive range of motion of the neck, there is no meningeal signs, there is symmetric and normal strength in the upper extremities, there are normal upper extremity deep tendon reflexes that are symmetric, sensation to light touch in dermatomes of the upper extremities is normal.  The right knee has no knee effusion, there is crepitus with passive range of motion and joint line tenderness.  There is no Baker's cyst.  " Following the knee joint injection, he describes significant symptom relief in the knee joint.        ASSESSMENT/PLAN:           1. Primary osteoarthritis of right knee    Discussed options for management of right knee pain from osteoarthritis including referral to orthopedic surgery to discuss total knee arthroplasty.  He would like to proceed with a cortisone injection given that his pain is refractory to Tylenol and icing and other conservative interventions.      Right knee joint cortisone injection  After discussion of risks and benefits of the procedure including bleeding and infection, verbal informed consent was obtained.  Area prepped and draped in sterile fashion.  Local anesthesia was obtained with 1% lidocaine.   The suprapatellar pouch was entered using a lateral approach.  1cc of K40 was injected into the joint space.  The procedure was tolerated well and after care was discussed.        - Large Joint/Bursa injection and/or drainage (Shoulder, Knee)  - triamcinolone (KENALOG-40) injection 40 mg; 1 mL (40 mg) by INTRA-ARTICULAR route once  - lidocaine 1 % injection 1 mL; 1 mL by Other route once    2. Neck pain  The etiology of this patient's neck pain is unclear, degenerative joint disease is the probable risk factor for neck pain although we cannot exclude polymyalgia rheumatica.  Since he does not have radicular symptoms, I am doubtful of a new focal disc herniation syndrome.  I did obtain an x-ray to exclude metastatic disease or fracture.  If the x-ray is negative and the sed rate is negative, consider a referral to sports medicine to see if they have any management strategies for what might be myofascial neck pain such as trigger point injections?  If the sed rate is high, consider trial of low-dose prednisone with short-term follow-up.  - XR Cervical Spine 2/3 Views; Future  - ESR: Erythrocyte sedimentation rate        Karson Bishop MD  [facility]

## 2019-01-10 NOTE — LETTER
[facility]  [address] [facility]  [address]  [place]  [address]  Tel: [phone]    January 14, 2019    Hermilo MER[facility] Eva  [address] [facility] [address]        Dear Mr. Velasquez,      The following letter pertains to your most recent diagnostic tests:     Your neck x-ray shows degenerative changes in the spine which is a risk factor for neck pain.  I would recommend that you consult with Dr. Azul who is a sports medicine specialist in our clinic who may be able to help you with your persistent pain with trigger point injections or other interventions that might improve symptoms.  You can call [phone] to schedule an appointment with Dr. Azul.     Your Sed rate blood test is stable and probably normal for someone in your age group.  I think this blood test excludes a muscle inflammatory condition called polymyalgia rheumatica causing your neck pain.    Sincerely,     Dr. Bishop  / ignacio     Component      Latest Ref Rng & Units 1/10/2019   Sed Rate      0 - 20 mm/h 33 (H)       Results for orders placed or performed in visit on 01/10/19   XR Cervical Spine 2/3 Views    Narrative    CERVICAL SPINE 2-3 VIEWS 1/10/2019 5:31 PM     HISTORY: Osteoarthritis. Neck pain.    COMPARISON: MR scan dated 2/22/2017    FINDINGS: There is loss of disc space height at C2-3 and C3-4. Mild  retrolisthesis at the C3-4 level. This is slightly more prominent than  on the MR scan. There is also loss of disc space height at C5-6 and  C6-7. Small anterior osteophytes are seen at these levels..  No  fractures are identified. No soft tissue swelling is identified.      Impression    IMPRESSION:  1. Multilevel degenerative change. Mild retrolisthesis of C3 on C4 due  to the degenerative changes. This is slightly more prominent than on  the MR scan of 2/22/2017.  2. No fractures are identified on these views.    SABA ARREOLA MD

## 2019-01-11 NOTE — PROGRESS NOTES
ANTICOAGULATION FOLLOW-UP CLINIC VISIT    Patient Name:  Hermilo Velasquez  Date:  1/10/2019  Contact Type:  Face to Face    SUBJECTIVE:     Patient Findings     Comments:   Recent knee infection             OBJECTIVE    INR Protime   Date Value Ref Range Status   01/10/2019 4.1 (A) 0.86 - 1.14 Final       ASSESSMENT / PLAN  INR assessment SUPRA    Recheck INR In: 1 WEEK    INR Location Clinic      Anticoagulation Summary  As of 1/10/2019    INR goal:   2.0-3.0   TTR:   47.2 % (2.8 y)   INR used for dosin.1! (1/10/2019)   Warfarin maintenance plan:   7.5 mg (5 mg x 1.5) every Wed, Sat; 5 mg (5 mg x 1) all other days   Full warfarin instructions:   1/10: 2.5 mg; : 5 mg; : 5 mg; Otherwise 7.5 mg every Wed, Sat; 5 mg all other days   Weekly warfarin total:   40 mg   Weekly max warfarin dose:   45 mg   Plan last modified:   Dione Giron RN (10/31/2018)   Next INR check:   2019   Target end date:   Indefinite    Indications    Long-term (current) use of anticoagulants [Z79.01] [Z79.01]  Pulmonary embolism  bilateral (H) [I26.99]  Paroxysmal atrial fibrillation (H) [I48.0]             Anticoagulation Episode Summary     INR check location:   [facility]    Preferred lab:       Send INR reminders to:   [facility] ANTICOAGUL[facility]    Comments:         Anticoagulation Care Providers     Provider Role Specialty Phone number    Karson Bishop MD [facility] [phone]            See the Encounter Report to view Anticoagulation Flowsheet and Dosing Calendar (Go to Encounters tab in chart review, and find the Anticoagulation Therapy Visit)    Dosage adjustment made based on physician directed care plan.  PT running supratheraputic today, does have some signs/symptoms of bleeding/bruising.  Advised pt to hold 2 half doses this week and return on Wed for recheck.  May need to adjust baseline doses as similar number 1 month ago.    Pt aware if signs of clotting (pain, tenderness, swelling,  color change in leg or arm, SOB) and bleeding occur (blood in stool, urine, large bruising, bleeding gums, nosebleeds) to have INR check sooner. If sx severe report to ER or concerned for stroke call 911. If general questions or concerns arise, call clinic.         Dione Giron RN

## 2019-01-13 NOTE — RESULT ENCOUNTER NOTE
The following letter pertains to your most recent diagnostic tests:    Your Sed rate blood test is stable and probably normal for someone in your age group.  I think this blood test excludes a muscle inflammatory condition called polymyalgia rheumatica causing your neck pain.      Sincerely,    Dr. Bishop

## 2019-01-13 NOTE — RESULT ENCOUNTER NOTE
The following letter pertains to your most recent diagnostic tests:    Your neck x-ray shows degenerative changes in the spine which is a risk factor for neck pain.  I would recommend that you consult with Dr. Azul who is a sports medicine specialist in our clinic who may be able to help you with your persistent pain with trigger point injections or other interventions that might improve symptoms.  You can call [phone] to schedule an appointment with Dr. Azul.       Sincerely,    Dr. Bishop

## 2019-01-13 NOTE — RESULT ENCOUNTER NOTE
Can you call Chapito and inform him of the results of his x-ray and blood test and help him set up an appointment with Dr. jones

## 2019-01-14 ENCOUNTER — TELEPHONE (OUTPATIENT)
Dept: FAMILY MEDICINE | Facility: CLINIC | Age: 84
End: 2019-01-14

## 2019-01-14 NOTE — TELEPHONE ENCOUNTER
Karson Bishop MD  [facility] Triage             Can you call Chapito and inform him of the results of his x-ray and blood test and help him set up an appointment with Dr. jones      Result Notes for ESR: Erythrocyte sedimentation rate     Notes recorded by Rabia Bernal CMA on 1/14/2019 at 8:32 AM CST  Letter w/ both lab and imaging results sent to pt.   Lucero COOLEY MA  ------    Notes recorded by Karson Bishop MD on 1/12/2019 at 7:21 PM CST  The following letter pertains to your most recent diagnostic tests:    Your Sed rate blood test is stable and probably normal for someone in your age group.  I think this blood test excludes a muscle inflammatory condition called polymyalgia rheumatica causing your neck pain.      Sincerely,    Dr. Bishop     Study Result     CERVICAL SPINE 2-3 VIEWS 1/10/2019 5:31 PM      HISTORY: Osteoarthritis. Neck pain.     COMPARISON: MR scan dated 2/22/2017     FINDINGS: There is loss of disc space height at C2-3 and C3-4. Mild  retrolisthesis at the C3-4 level. This is slightly more prominent than  on the MR scan. There is also loss of disc space height at C5-6 and  C6-7. Small anterior osteophytes are seen at these levels..  No  fractures are identified. No soft tissue swelling is identified.                                                                      IMPRESSION:  1. Multilevel degenerative change. Mild retrolisthesis of C3 on C4 due  to the degenerative changes. This is slightly more prominent than on  the MR scan of 2/22/2017.  2. No fractures are identified on these views.     SABA ARREOLA MD

## 2019-01-14 NOTE — TELEPHONE ENCOUNTER
Called patient and notified of below results per PCP    Forwarding encounter to pain clinic to schedule patient with Dr. Azul:     [facility] EP Triage ([id number])   Could not locate pool: [facility] ([id number])

## 2019-01-15 NOTE — TELEPHONE ENCOUNTER
The patient did not want to schedule with Dr Azul at this time.     He will call back if he changes his mind.

## 2019-01-16 ENCOUNTER — ANTICOAGULATION THERAPY VISIT (OUTPATIENT)
Dept: NURSING | Facility: CLINIC | Age: 84
End: 2019-01-16
Payer: COMMERCIAL

## 2019-01-16 DIAGNOSIS — I48.0 PAROXYSMAL ATRIAL FIBRILLATION (H): ICD-10-CM

## 2019-01-16 DIAGNOSIS — I26.99 PULMONARY EMBOLISM, BILATERAL (H): ICD-10-CM

## 2019-01-16 LAB — INR POINT OF CARE: 2.3 (ref 0.86–1.14)

## 2019-01-16 PROCEDURE — 99207 ZZC NO CHARGE NURSE ONLY: CPT

## 2019-01-16 PROCEDURE — 36416 COLLJ CAPILLARY BLOOD SPEC: CPT

## 2019-01-16 PROCEDURE — 85610 PROTHROMBIN TIME: CPT | Mod: QW

## 2019-01-16 NOTE — PROGRESS NOTES
ANTICOAGULATION FOLLOW-UP CLINIC VISIT    Patient Name:  Hermilo Velasquez  Date:  2019  Contact Type:  Face to Face    SUBJECTIVE:     Patient Findings     Positives:   No Problem Findings           OBJECTIVE    INR Protime   Date Value Ref Range Status   2019 2.3 (A) 0.86 - 1.14 Final       ASSESSMENT / PLAN  INR assessment THER    Recheck INR In: 2 WEEKS    INR Location Clinic      Anticoagulation Summary  As of 2019    INR goal:   2.0-3.0   TTR:   47.1 % (2.8 y)   INR used for dosin.3 (2019)   Warfarin maintenance plan:   5 mg (5 mg x 1) every day   Full warfarin instructions:   : 5 mg; Otherwise 5 mg every day   Weekly warfarin total:   35 mg   Weekly max warfarin dose:   45 mg   Plan last modified:   Dione Giron RN (2019)   Next INR check:   2019   Target end date:   Indefinite    Indications    Long-term (current) use of anticoagulants [Z79.01] [Z79.01]  Pulmonary embolism  bilateral (H) [I26.99]  Paroxysmal atrial fibrillation (H) [I48.0]             Anticoagulation Episode Summary     [facility] location[facility]   [facility]    Preferred lab:       Send INR reminders to:   [facility] ANTICOAGULATION    Comments:         Anticoagulation Care Providers     Provider Role Specialty Phone number    Karson Bishop MD [facility] [phone]            See the Encounter Report to view Anticoagulation Flowsheet and Dosing Calendar (Go to Encounters tab in chart review, and find the Anticoagulation Therapy Visit)    Dosage adjustment made based on physician directed care plan.    Dione Giron RN

## 2019-01-30 ENCOUNTER — ANTICOAGULATION THERAPY VISIT (OUTPATIENT)
Dept: NURSING | Facility: CLINIC | Age: 84
End: 2019-01-30
Payer: COMMERCIAL

## 2019-01-30 DIAGNOSIS — I48.0 PAROXYSMAL ATRIAL FIBRILLATION (H): ICD-10-CM

## 2019-01-30 DIAGNOSIS — I26.99 PULMONARY EMBOLISM, BILATERAL (H): ICD-10-CM

## 2019-01-30 LAB — INR POINT OF CARE: 2.2 (ref 0.86–1.14)

## 2019-01-30 PROCEDURE — 85610 PROTHROMBIN TIME: CPT | Mod: QW

## 2019-01-30 PROCEDURE — 36416 COLLJ CAPILLARY BLOOD SPEC: CPT

## 2019-01-30 PROCEDURE — 99207 ZZC NO CHARGE NURSE ONLY: CPT

## 2019-01-30 NOTE — PROGRESS NOTES
ANTICOAGULATION FOLLOW-UP CLINIC VISIT    Patient Name:  Hermilo Velasquez  Date:  2019  Contact Type:  Face to Face    SUBJECTIVE:     Patient Findings     Positives:   No Problem Findings           OBJECTIVE    INR Protime   Date Value Ref Range Status   2019 2.2 (A) 0.86 - 1.14 Final       ASSESSMENT / PLAN  INR assessment THER    Recheck INR In: 3 WEEKS    INR Location Clinic      Anticoagulation Summary  As of 2019    INR goal:   2.0-3.0   TTR:   47.8 % (2.9 y)   INR used for dosin.2 (2019)   Warfarin maintenance plan:   5 mg (5 mg x 1) every day   Full warfarin instructions:   5 mg every day   Weekly warfarin total:   35 mg   Weekly max warfarin dose:   45 mg   No change documented:   Analisa Vigil RN   Plan last modified:   Dione Giron RN (2019)   Next INR check:   2019   Target end date:   Indefinite    Indications    Long-term (current) use of anticoagulants [Z79.01] [Z79.01]  Pulmonary embolism  bilateral (H) [I26.99]  Paroxysmal atrial fibrillation (H) [I48.0]             Anticoagulation Episode Summary     INR check location:   [facility]    Preferred lab:       Send INR reminders to:   [facility] ANTICOAGULATION    Comments:         Anticoagulation Care Providers     Provider Role Specialty Phone number    Karson Bishop MD [facility] [phone]            See the Encounter Report to view Anticoagulation Flowsheet and Dosing Calendar (Go to Encounters tab in chart review, and find the Anticoagulation Therapy Visit)    Dosage adjustment made based on physician directed care plan. INR 2.2.  Continue 5 mg daily and recheck 3 weeks.    Analisa Vigil RN

## 2019-02-05 ENCOUNTER — TELEPHONE (OUTPATIENT)
Dept: FAMILY MEDICINE | Facility: CLINIC | Age: 84
End: 2019-02-05

## 2019-02-06 NOTE — TELEPHONE ENCOUNTER
Prior Authorization Retail Medication Request    Medication/Dose: Hydroxyzine HCl 25  ICD code (if different than what is on RX):  L29.9  Previously Tried and Failed:  None  Rationale:  Patient stable on current medication    Insurance Name:  [facility]  Insurance ID:  [id number]      Pharmacy Information (if different than what is on RX)  Name:  [id number]  Phone:  [phone]

## 2019-02-08 ENCOUNTER — APPOINTMENT (OUTPATIENT)
Age: 84
Setting detail: DERMATOLOGY
End: 2019-02-20

## 2019-02-08 DIAGNOSIS — L27.0 GENERALIZED SKIN ERUPTION DUE TO DRUGS AND MEDICAMENTS TAKEN INTERNALLY: ICD-10-CM

## 2019-02-08 DIAGNOSIS — L82.0 INFLAMED SEBORRHEIC KERATOSIS: ICD-10-CM

## 2019-02-08 PROBLEM — L30.9 DERMATITIS, UNSPECIFIED: Status: ACTIVE | Noted: 2019-02-08

## 2019-02-08 PROCEDURE — OTHER MIPS QUALITY: OTHER

## 2019-02-08 PROCEDURE — OTHER DIAGNOSIS COMMENT: OTHER

## 2019-02-08 PROCEDURE — OTHER BIOPSY BY PUNCH METHOD: OTHER

## 2019-02-08 PROCEDURE — OTHER COUNSELING: OTHER

## 2019-02-08 PROCEDURE — 11104 PUNCH BX SKIN SINGLE LESION: CPT | Mod: 59

## 2019-02-08 PROCEDURE — OTHER LIQUID NITROGEN: OTHER

## 2019-02-08 PROCEDURE — OTHER TREATMENT REGIMEN: OTHER

## 2019-02-08 PROCEDURE — 17111 DESTRUCT LESION 15 OR MORE: CPT

## 2019-02-08 ASSESSMENT — LOCATION SIMPLE DESCRIPTION DERM
LOCATION SIMPLE: CHEST
LOCATION SIMPLE: RIGHT ANTERIOR NECK
LOCATION SIMPLE: RIGHT UPPER BACK
LOCATION SIMPLE: RIGHT LOWER BACK
LOCATION SIMPLE: LEFT UPPER BACK
LOCATION SIMPLE: LEFT SHOULDER
LOCATION SIMPLE: LEFT ANTERIOR NECK
LOCATION SIMPLE: ABDOMEN
LOCATION SIMPLE: LEFT LOWER BACK
LOCATION SIMPLE: POSTERIOR NECK
LOCATION SIMPLE: LEFT CLAVICULAR SKIN

## 2019-02-08 ASSESSMENT — LOCATION DETAILED DESCRIPTION DERM
LOCATION DETAILED: RIGHT SUPERIOR LATERAL MIDBACK
LOCATION DETAILED: LEFT POSTERIOR SHOULDER
LOCATION DETAILED: RIGHT LATERAL ABDOMEN
LOCATION DETAILED: LEFT SUPERIOR LATERAL LOWER BACK
LOCATION DETAILED: RIGHT SUPERIOR MEDIAL UPPER BACK
LOCATION DETAILED: LEFT SUPERIOR UPPER BACK
LOCATION DETAILED: LEFT SUPERIOR LATERAL UPPER BACK
LOCATION DETAILED: LEFT LATERAL TRAPEZIAL NECK
LOCATION DETAILED: RIGHT LATERAL SUPERIOR CHEST
LOCATION DETAILED: LEFT CLAVICULAR SKIN
LOCATION DETAILED: LEFT LATERAL SUPERIOR CHEST
LOCATION DETAILED: LEFT CLAVICULAR NECK
LOCATION DETAILED: LEFT SUPERIOR MEDIAL MIDBACK
LOCATION DETAILED: RIGHT CLAVICULAR NECK
LOCATION DETAILED: RIGHT LATERAL UPPER BACK
LOCATION DETAILED: RIGHT SUPERIOR LATERAL UPPER BACK
LOCATION DETAILED: RIGHT SUPERIOR UPPER BACK

## 2019-02-08 ASSESSMENT — LOCATION ZONE DERM
LOCATION ZONE: TRUNK
LOCATION ZONE: NECK
LOCATION ZONE: ARM

## 2019-02-08 NOTE — PROCEDURE: LIQUID NITROGEN
Render Post-Care Instructions In Note?: yes
Add 52 Modifier (Optional): no
Post-Care Instructions: I reviewed with the patient in detail post-care instructions. Patient is to wear sunprotection, and avoid picking at any of the treated lesions. Pt may apply Vaseline to crusted or scabbing areas.
Duration Of Freeze Thaw-Cycle (Seconds): 15-20
Consent: The patient's consent was obtained including but not limited to risks of crusting, scabbing, blistering, scarring, darker or lighter pigmentary change, recurrence, incomplete removal and infection.
Medical Necessity Clause: This procedure was medically necessary because the lesions that were treated were:
Detail Level: Simple
Number Of Freeze-Thaw Cycles: 1 freeze-thaw cycle
Medical Necessity Information: It is in your best interest to select a reason for this procedure from the list below. All of these items fulfill various CMS LCD requirements except the new and changing color options.

## 2019-02-08 NOTE — PROCEDURE: MIPS QUALITY
Detail Level: Detailed
Quality 265: Biopsy Follow-Up: Biopsy results reviewed, communicated, tracked, and documented
Quality 431: Preventive Care And Screening: Unhealthy Alcohol Use - Screening: Patient screened for unhealthy alcohol use using a single question and scores less than 2 times per year
Quality 131: Pain Assessment And Follow-Up: Pain assessment using a standardized tool is documented as negative, no follow-up plan required
Quality 226: Preventive Care And Screening: Tobacco Use: Screening And Cessation Intervention: Patient screened for tobacco and is an ex-smoker
Quality 110: Preventive Care And Screening: Influenza Immunization: Influenza Immunization previously received during influenza season
Quality 130: Documentation Of Current Medications In The Medical Record: Current Medications Documented

## 2019-02-08 NOTE — PROCEDURE: TREATMENT REGIMEN
Detail Level: Detailed
Modify Regimen: Zyrtec 2-3times a day\\nHydroxyzine QHS \\nTriamcinolone cream Twice a day.\\nWear a clean (new daily) Cotton t shirt underneath.\\nTrim/file fingernails

## 2019-02-08 NOTE — PROCEDURE: BIOPSY BY PUNCH METHOD
Was A Bandage Applied: Yes
Patient Will Remove Sutures At Home?: No
X Depth Of Punch In Cm (Optional): 0
Suture Removal: 12 days
Consent: Written consent was obtained and risks were reviewed including but not limited to scarring, infection, bleeding, scabbing, incomplete removal, nerve damage and allergy to anesthesia.
Billing Type: Third-Party Bill
Epidermal Sutures: 4-0 Monosof
Home Suture Removal Text: Patient was provided a home suture removal kit and will remove their sutures at home.  If they have any questions or difficulties they will call the office.
Hemostasis: Pressure
Anesthesia Type: 1% lidocaine with epinephrine and a 1:10 solution of 8.4% sodium bicarbonate
Detail Level: Detailed
Number Of Epidermal Sutures (Optional): 2
Anesthesia Volume In Cc (Will Not Render If 0): 0.4
Body Location Override (Optional - Billing Will Still Be Based On Selected Body Map Location If Applicable): mid upper back
Dressing: Telfa and Tegaderm
Biopsy Type: H and E
Notification Instructions: Patient will be notified of biopsy results. However, patient instructed to call the office if not contacted within 2 weeks.
Punch Size In Mm: 3
Wound Care: Petrolatum
Post-Care Instructions: I reviewed with the patient in detail post-care instructions. Patient is to keep the biopsy site dry overnight, and then apply bacitracin twice daily until healed. Patient may apply hydrogen peroxide soaks to remove any crusting.

## 2019-02-11 NOTE — TELEPHONE ENCOUNTER
Prior Authorization Approval    Authorization Effective Date: 1/12/2019  Authorization Expiration Date: 2/11/2020  Medication: Hydroxyzine HCl 25 - APPROVED  Approved Dose/Quantity: 120 FOR 15  Reference #:     Insurance Company: [employer] - Phone [phone] Fax [phone]  Expected CoPay:       CoPay Card Available:      Foundation Assistance Needed:    Which Pharmacy is filling the prescription (Not needed for infusion/clinic administered): [id number] - [place], [place] - [address] [facility]  Pharmacy Notified: Yes  Patient Notified: Yes

## 2019-02-15 DIAGNOSIS — E78.2 MIXED HYPERLIPIDEMIA: ICD-10-CM

## 2019-02-15 RX ORDER — ATORVASTATIN CALCIUM 10 MG/1
10 TABLET, FILM COATED ORAL DAILY
Qty: 90 TABLET | Refills: 0 | Status: SHIPPED | OUTPATIENT
Start: 2019-02-15 | End: 2019-07-24

## 2019-02-18 ENCOUNTER — APPOINTMENT (OUTPATIENT)
Age: 84
Setting detail: DERMATOLOGY
End: 2019-02-18

## 2019-02-18 DIAGNOSIS — L27.0 GENERALIZED SKIN ERUPTION DUE TO DRUGS AND MEDICAMENTS TAKEN INTERNALLY: ICD-10-CM

## 2019-02-18 DIAGNOSIS — L29.9 CHRONIC PRURITUS: ICD-10-CM

## 2019-02-18 NOTE — TELEPHONE ENCOUNTER
Reason for Call:  Medication or medication refill:    Do you use a [facility]?  Name of the pharmacy and phone number for the current request:   [id number] - [place], [place] - [address]        Name of the medication requested: a substitute for hydrOXYzine (ATARAX) 25 MG tablet    Other request:  Pt states the cost for the HyDroxyzine is too high     Can we leave a detailed message on this number? YES    Phone number patient can be reached at: Home number on file [phone] (home)    Best Time: any    Call taken on 2/18/2019 at 10:54 AM by Sophia Sweeney

## 2019-02-18 NOTE — TELEPHONE ENCOUNTER
"hydrOXYzine (ATARAX) 25 MG tablet 120 tablet 0 12/31/2018  No   Sig: Take 1 to 2 tablets (25-50 mg) by mouth every 6 hours as needed for itching      Last Written Prescription Date:  12/31/2018  Last Fill Quantity: 120,  # refills:  0  Last office visit: 1/10/2019 with prescribing provider:    Future Office Visit:      Requested Prescriptions   Pending Prescriptions Disp Refills     hydrOXYzine (ATARAX) 25 MG tablet 120 tablet 0    Antihistamines Protocol Passed - 2/18/2019 11:14 AM       Passed - Recent (12 mo) or future (30 days) visit within the authorizing provider's specialty    Patient had office visit in the last 12 months or has a visit in the next 30 days with authorizing provider or within the authorizing provider's specialty.  See \"Patient Info\" tab in inbasket, or \"Choose Columns\" in Meds & Orders section of the refill encounter.             Passed - Patient is age 3 or older    Apply age and/or weight-based dosing for peds patients age 3 and older.    Forward request to provider for patients under the age of 3.         Passed - Medication is active on med list            "

## 2019-02-20 RX ORDER — HYDROXYZINE HYDROCHLORIDE 25 MG/1
50 TABLET, FILM COATED ORAL EVERY 6 HOURS PRN
Qty: 120 TABLET | Refills: 3 | Status: SHIPPED | OUTPATIENT
Start: 2019-02-20 | End: 2019-05-13

## 2019-02-20 NOTE — TELEPHONE ENCOUNTER
Routing refill request to provider for review/approval because:  Patient requesting a substitute for hydroxyzine due to cost.    ARCADIO BloomN, RN  [employer] Triage

## 2019-02-22 ENCOUNTER — TRANSFERRED RECORDS (OUTPATIENT)
Dept: HEALTH INFORMATION MANAGEMENT | Facility: CLINIC | Age: 84
End: 2019-02-22

## 2019-02-22 ENCOUNTER — APPOINTMENT (OUTPATIENT)
Age: 84
Setting detail: DERMATOLOGY
End: 2019-02-23

## 2019-02-22 DIAGNOSIS — L82.0 INFLAMED SEBORRHEIC KERATOSIS: ICD-10-CM

## 2019-02-22 DIAGNOSIS — T50.905 ADVERSE EFFECT OF UNSPECIFIED DRUGS, MEDICAMENTS AND BIOLOGICAL SUBSTANCES: ICD-10-CM

## 2019-02-22 PROBLEM — T50.905A ADVERSE EFFECT OF UNSPECIFIED DRUGS, MEDICAMENTS AND BIOLOGICAL SUBSTANCES, INITIAL ENCOUNTER: Status: ACTIVE | Noted: 2019-02-22

## 2019-02-22 PROCEDURE — OTHER PRESCRIPTION: OTHER

## 2019-02-22 PROCEDURE — 17111 DESTRUCT LESION 15 OR MORE: CPT

## 2019-02-22 PROCEDURE — OTHER LIQUID NITROGEN: OTHER

## 2019-02-22 PROCEDURE — 99213 OFFICE O/P EST LOW 20 MIN: CPT | Mod: 25

## 2019-02-22 PROCEDURE — OTHER MIPS QUALITY: OTHER

## 2019-02-22 PROCEDURE — OTHER COUNSELING: OTHER

## 2019-02-22 RX ORDER — TRIAMCINOLONE ACETONIDE 1 MG/G
0.1% CREAM TOPICAL BID
Qty: 454 | Refills: 2 | Status: ERX

## 2019-02-22 ASSESSMENT — LOCATION DETAILED DESCRIPTION DERM
LOCATION DETAILED: LEFT LATERAL UPPER BACK
LOCATION DETAILED: LEFT MEDIAL SUPERIOR CHEST
LOCATION DETAILED: RIGHT INFERIOR UPPER BACK
LOCATION DETAILED: LEFT INFERIOR MEDIAL MIDBACK
LOCATION DETAILED: RIGHT MID-UPPER BACK
LOCATION DETAILED: LEFT SUPERIOR LATERAL MIDBACK
LOCATION DETAILED: LEFT INFERIOR LATERAL UPPER BACK
LOCATION DETAILED: RIGHT INFERIOR LATERAL MIDBACK
LOCATION DETAILED: RIGHT SUPERIOR UPPER BACK
LOCATION DETAILED: RIGHT LATERAL UPPER BACK
LOCATION DETAILED: LEFT SUPERIOR MEDIAL MIDBACK
LOCATION DETAILED: LEFT INFERIOR UPPER BACK
LOCATION DETAILED: LEFT MID-UPPER BACK

## 2019-02-22 ASSESSMENT — LOCATION SIMPLE DESCRIPTION DERM
LOCATION SIMPLE: CHEST
LOCATION SIMPLE: RIGHT LOWER BACK
LOCATION SIMPLE: RIGHT UPPER BACK
LOCATION SIMPLE: LEFT LOWER BACK
LOCATION SIMPLE: LEFT UPPER BACK

## 2019-02-22 ASSESSMENT — LOCATION ZONE DERM: LOCATION ZONE: TRUNK

## 2019-02-22 NOTE — PROCEDURE: LIQUID NITROGEN
Detail Level: Simple
Post-Care Instructions: I reviewed with the patient in detail post-care instructions. Patient is to wear sunprotection, and avoid picking at any of the treated lesions. Pt may apply Vaseline to crusted or scabbing areas.
Medical Necessity Clause: This procedure was medically necessary because the lesions that were treated were:
Add 52 Modifier (Optional): no
Duration Of Freeze Thaw-Cycle (Seconds): 15-20
Medical Necessity Information: It is in your best interest to select a reason for this procedure from the list below. All of these items fulfill various CMS LCD requirements except the new and changing color options.
Consent: The patient's consent was obtained including but not limited to risks of crusting, scabbing, blistering, scarring, darker or lighter pigmentary change, recurrence, incomplete removal and infection.
Number Of Freeze-Thaw Cycles: 1 freeze-thaw cycle
Render Post-Care Instructions In Note?: yes

## 2019-02-25 ENCOUNTER — ANTICOAGULATION THERAPY VISIT (OUTPATIENT)
Dept: NURSING | Facility: CLINIC | Age: 84
End: 2019-02-25
Payer: COMMERCIAL

## 2019-02-25 LAB — INR POINT OF CARE: 2.9 (ref 0.86–1.14)

## 2019-02-25 PROCEDURE — 36416 COLLJ CAPILLARY BLOOD SPEC: CPT

## 2019-02-25 PROCEDURE — 99207 ZZC NO CHARGE NURSE ONLY: CPT

## 2019-02-25 PROCEDURE — 85610 PROTHROMBIN TIME: CPT | Mod: QW

## 2019-02-25 NOTE — PROGRESS NOTES
ANTICOAGULATION FOLLOW-UP CLINIC VISIT    Patient Name:  Hermilo Velasquez  Date:  2019  Contact Type:  Face to Face    SUBJECTIVE:     Patient Findings     Positives:   No Problem Findings           OBJECTIVE    INR Protime   Date Value Ref Range Status   2019 2.9 (A) 0.86 - 1.14 Final       ASSESSMENT / PLAN  INR assessment THER    Recheck INR In: 4 WEEKS    INR Location Clinic      Anticoagulation Summary  As of 2019    INR goal:   2.0-3.0   TTR:   49.1 % (3 y)   INR used for dosin.9 (2019)   Warfarin maintenance plan:   5 mg (5 mg x 1) every day   Full warfarin instructions:   5 mg every day   Weekly warfarin total:   35 mg   Weekly max warfarin dose:   45 mg   No change documented:   Mesha Bah RN   Plan last modified:   Dione Giron RN (2019)   Next INR check:   3/25/2019   Target end date:   Indefinite    Indications    Long-term (current) use of anticoagulants [Z79.01] [Z79.01]  Pulmonary embolism  bilateral (H) [I26.99]  Paroxysmal atrial fibrillation (H) [I48.0]             Anticoagulation Episode Summary     [facility] check location[facility]   [facility]    Preferred lab:       Send INR reminders to:   [facility] ANTICOAG[facility]    Comments:         Anticoagulation Care Providers     Provider Role Specialty Phone number    Karson Bishop MD [facility] [phone]            See the Encounter Report to view Anticoagulation Flowsheet and Dosing Calendar (Go to Encounters tab in chart review, and find the Anticoagulation Therapy Visit)    Pt is 2.9 today. Will have pt continue maintenance dose of 5 mg daily and recheck in 4 weeks. Pt states that he has been having dry and itchy skin. Pt advised to use body lotion especially after bathing. Pt states that he does use lotion. Suggested that pt try a different brand. Pt denies any changes in health,diet,activity or medication. Hermilo aware if signs of clotting (pain, tenderness, swelling, color change in leg or arm,  SOB) and bleeding occur (blood in stool, urine, large bruising, bleeding gums, nosebleeds) to have INR check sooner. If sx severe report to ER or concerned for stroke call 911. If general questions or concerns arise, call clinic.         Mesha aBh RN

## 2019-03-01 ENCOUNTER — OFFICE VISIT (OUTPATIENT)
Dept: FAMILY MEDICINE | Facility: CLINIC | Age: 84
End: 2019-03-01
Payer: COMMERCIAL

## 2019-03-01 VITALS
HEART RATE: 60 BPM | HEIGHT: 70 IN | SYSTOLIC BLOOD PRESSURE: 132 MMHG | TEMPERATURE: 97.1 F | WEIGHT: 177.8 LBS | OXYGEN SATURATION: 96 % | BODY MASS INDEX: 25.45 KG/M2 | DIASTOLIC BLOOD PRESSURE: 62 MMHG

## 2019-03-01 DIAGNOSIS — G62.9 PERIPHERAL POLYNEUROPATHY: ICD-10-CM

## 2019-03-01 DIAGNOSIS — I26.99 PULMONARY EMBOLISM, BILATERAL (H): ICD-10-CM

## 2019-03-01 DIAGNOSIS — J44.9 CHRONIC OBSTRUCTIVE PULMONARY DISEASE, UNSPECIFIED COPD TYPE (H): ICD-10-CM

## 2019-03-01 DIAGNOSIS — L30.8 SPONGIOTIC DERMATITIS: Primary | ICD-10-CM

## 2019-03-01 DIAGNOSIS — R91.8 PULMONARY NODULES: ICD-10-CM

## 2019-03-01 DIAGNOSIS — D50.0 IRON DEFICIENCY ANEMIA DUE TO CHRONIC BLOOD LOSS: ICD-10-CM

## 2019-03-01 DIAGNOSIS — I48.0 PAROXYSMAL ATRIAL FIBRILLATION (H): ICD-10-CM

## 2019-03-01 DIAGNOSIS — C85.90 NON-HODGKIN'S LYMPHOMA, UNSPECIFIED BODY REGION, UNSPECIFIED NON-HODGKIN LYMPHOMA TYPE (H): ICD-10-CM

## 2019-03-01 LAB
ALBUMIN SERPL-MCNC: 3.1 G/DL (ref 3.4–5)
ALP SERPL-CCNC: 91 U/L (ref 40–150)
ALT SERPL W P-5'-P-CCNC: 15 U/L (ref 0–70)
ANION GAP SERPL CALCULATED.3IONS-SCNC: 6 MMOL/L (ref 3–14)
AST SERPL W P-5'-P-CCNC: 20 U/L (ref 0–45)
BILIRUB SERPL-MCNC: 0.2 MG/DL (ref 0.2–1.3)
BUN SERPL-MCNC: 22 MG/DL (ref 7–30)
CALCIUM SERPL-MCNC: 8.8 MG/DL (ref 8.5–10.1)
CHLORIDE SERPL-SCNC: 109 MMOL/L (ref 94–109)
CO2 SERPL-SCNC: 27 MMOL/L (ref 20–32)
CREAT SERPL-MCNC: 1.07 MG/DL (ref 0.66–1.25)
ERYTHROCYTE [DISTWIDTH] IN BLOOD BY AUTOMATED COUNT: 14.2 % (ref 10–15)
FERRITIN SERPL-MCNC: 36 NG/ML (ref 26–388)
GFR SERPL CREATININE-BSD FRML MDRD: 62 ML/MIN/{1.73_M2}
GLUCOSE SERPL-MCNC: 86 MG/DL (ref 70–99)
HCT VFR BLD AUTO: 41.1 % (ref 40–53)
HGB BLD-MCNC: 12.7 G/DL (ref 13.3–17.7)
MCH RBC QN AUTO: 28 PG (ref 26.5–33)
MCHC RBC AUTO-ENTMCNC: 30.9 G/DL (ref 31.5–36.5)
MCV RBC AUTO: 91 FL (ref 78–100)
PLATELET # BLD AUTO: 182 10E9/L (ref 150–450)
POTASSIUM SERPL-SCNC: 4.4 MMOL/L (ref 3.4–5.3)
PROT SERPL-MCNC: 6.5 G/DL (ref 6.8–8.8)
RBC # BLD AUTO: 4.54 10E12/L (ref 4.4–5.9)
SODIUM SERPL-SCNC: 142 MMOL/L (ref 133–144)
WBC # BLD AUTO: 8.1 10E9/L (ref 4–11)

## 2019-03-01 PROCEDURE — 99214 OFFICE O/P EST MOD 30 MIN: CPT | Performed by: INTERNAL MEDICINE

## 2019-03-01 PROCEDURE — 80053 COMPREHEN METABOLIC PANEL: CPT | Performed by: INTERNAL MEDICINE

## 2019-03-01 PROCEDURE — 36415 COLL VENOUS BLD VENIPUNCTURE: CPT | Performed by: INTERNAL MEDICINE

## 2019-03-01 PROCEDURE — 85027 COMPLETE CBC AUTOMATED: CPT | Performed by: INTERNAL MEDICINE

## 2019-03-01 PROCEDURE — 82728 ASSAY OF FERRITIN: CPT | Performed by: INTERNAL MEDICINE

## 2019-03-01 ASSESSMENT — MIFFLIN-ST. JEOR: SCORE: 1492.75

## 2019-03-01 NOTE — PROGRESS NOTES
[facility]  CLINIC PROGRESS NOTE    Subjective:  Spongiotic Dermatitis with Prominent Eosinophils   Hermilo Velasquez has been following with dermatology and recent biopsy showed spongiotic dermatitis.  He has tried reducing medications.  Most recently he stopped omeprazole 20 mg and was switched to ranitidine.  He was getting much more relief from gastroesophageal reflux when he was taking omeprazole.  He does not feel that the itching has improved much either.  He is also taking Tums to help with the symptoms.   Lung nodule   Hermilo Velasquez was noted to have a lung nodule on CT abdomen back in 2016 and was referred to see Dr. Vasques in oncology.  We have no records of his follow up appointment, but he believes he may have had a follow up in oncology as he also has history of Non-hodgkins lymphoma.  He is not experiencing any shortness of breath or chest pain at this time.    Paroxysmal atrial fibrillation (H)   He is taking warfarin and has had occasional bruising.   Pulmonary embolism, bilateral (H)   As above - no concerns.    Chronic obstructive pulmonary disease, unspecified COPD type (H)   He is using the albuterol as needed and did have good results with Spiriva, but not taking this now.    Non-Hodgkin's lymphoma, unspecified body region, unspecified non-Hodgkin lymphoma type (H)   He has been recommended to follow up      Past medical history, medications, allergies, social history, family history reviewed and updated in [facility] as of 3/1/2019 .    ROS  CONSTITUTIONAL: + fatigue, no unexpected change in weight  SKIN: dermatitis as above -   EYES: no acute vision problems or changes  ENT: no ear problems, no mouth problems, no throat problems  RESP: no significant cough, no shortness of breath  CV: no chest pain, no palpitations, no new or worsening peripheral edema  GI: no nausea, no vomiting, no constipation, no diarrhea  : no frequency, no dysuria, no hematuria  MS: stable neuropathy -  "using gabapentin as needed   PSYCHIATRIC: no changes in mood or affect      Objective:  Vitals  /62 (BP Location: Right arm, Cuff Size: Adult Regular)   Pulse 60   Temp 97.1  F (36.2  C) (Oral)   Ht 1.778 m (5' 10\")   Wt 80.6 kg (177 lb 12.8 oz)   SpO2 96%   BMI 25.51 kg/m    GEN: Alert Oriented x3 NAD  HEENT: Atraumatic, normocephalic, neck supple, no thyromegaly, negative cervical adenopathy  TM: TM bilaterally pearly and grey with normal light reflex  CV: RRR no murmurs or rubs  PULM: CTA no wheezes or crackles  ABD: Soft, nontender nondistended, no hepatosplenomegally  SKIN: No visible skin lesion or ulcerations  EXT: 2+ dorsal pedis pulses, no edema bilateral lower extremities  NEURO: Gait and station deferred, No focal neurologic deficits  PSYCH: Mood good, affect mood congruent          Results for orders placed or performed in visit on 03/01/19 (from the past 24 hour(s))   CBC with platelets   Result Value Ref Range    WBC 8.1 4.0 - 11.0 10e9/L    RBC Count 4.54 4.4 - 5.9 10e12/L    Hemoglobin 12.7 (L) 13.3 - 17.7 g/dL    Hematocrit 41.1 40.0 - 53.0 %    MCV 91 78 - 100 fl    MCH 28.0 26.5 - 33.0 pg    MCHC 30.9 (L) 31.5 - 36.5 g/dL    RDW 14.2 10.0 - 15.0 %    Platelet Count 182 150 - 450 10e9/L       Assessment/Plan:  Patient Instructions   (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  Continue to hold omeprazole and treat gastroesophageal reflux with only Ranitidine.  We will ask pharmacist in medical therapeutic management whether it would be reasonable to try adding a medication such as Carafate.  I would also recommend holding gabapentin as this could cause the rash too.  We will also check labs again today as you could possibly stop taking iron replacement.     Plan: CBC with platelets, Comprehensive metabolic         panel, Ferritin            (I48.0) Paroxysmal atrial fibrillation (H)  Comment: Continue warfarin for now " and consider follow up in cardiology   Plan:     (I26.99) Pulmonary embolism, bilateral (H)  Comment: as above   Plan:     (J44.9) Chronic obstructive pulmonary disease, unspecified COPD type (H)  Comment: Continue albuterol.  Spiriva will remain available at the pharmacy, but not filled today  Plan:     (C85.90) Non-Hodgkin's lymphoma, unspecified body region, unspecified non-Hodgkin lymphoma type (H)  Comment: check complete blood counts and comprehensive metabolic panel today   Plan: CBC with platelets, Comprehensive metabolic         panel            (R91.8) Pulmonary nodules  Comment: Noted that it does not appear that you have followed up in oncology for this nodule and a follow up CT scan is recommended - [facility] phone #[phone]   Plan: CT Chest w/o Contrast            (D50.0) Iron deficiency anemia due to chronic blood loss  Comment: Check iron levels and complete blood counts today  Plan: Ferritin            (G62.9) Peripheral polyneuropathy  Comment: Hold gabapentin for rash  Plan:        Follow up in 1 month    Disclaimer: This note consists of symbols derived from keyboarding, dictation and/or voice recognition software. As a result, there may be errors in the script that have gone undetected. Please consider this when interpreting information found in this chart.    Bucky Boone MD  [phone]

## 2019-03-01 NOTE — LETTER
[facility]  [address]  [address]  [place]  [address]  Tel: [phone]    March 4, 2019    Hermilo MADRID Eva  [address] [facility] [address]        Dear Mr. Velasquez,    I had the opportunity to review your recent labs and a summary of your labs reads as follows:    Your complete blood counts show stable anemia, normal white blood cell count and platelets.  Your comprehensive metabolic panel showed normal renal function, normal liver function, and normal fasting blood glucose indicating no evidence of diabetes mellitus.  Your iron levels returned stable - I would recommend continuing your iron replacement    Sincerely,    Bucky Boone MD/SOHA          Enclosure: Lab Results  Results for orders placed or performed in visit on 03/01/19   CBC with platelets   Result Value Ref Range    WBC 8.1 4.0 - 11.0 10e9/L    RBC Count 4.54 4.4 - 5.9 10e12/L    Hemoglobin 12.7 (L) 13.3 - 17.7 g/dL    Hematocrit 41.1 40.0 - 53.0 %    MCV 91 78 - 100 fl    MCH 28.0 26.5 - 33.0 pg    MCHC 30.9 (L) 31.5 - 36.5 g/dL    RDW 14.2 10.0 - 15.0 %    Platelet Count 182 150 - 450 10e9/L   Comprehensive metabolic panel   Result Value Ref Range    Sodium 142 133 - 144 mmol/L    Potassium 4.4 3.4 - 5.3 mmol/L    Chloride 109 94 - 109 mmol/L    Carbon Dioxide 27 20 - 32 mmol/L    Anion Gap 6 3 - 14 mmol/L    Glucose 86 70 - 99 mg/dL    Urea Nitrogen 22 7 - 30 mg/dL    Creatinine 1.07 0.66 - 1.25 mg/dL    GFR Estimate 62 >60 mL/min/[1.73_m2]    GFR Estimate If Black 72 >60 mL/min/[1.73_m2]    Calcium 8.8 8.5 - 10.1 mg/dL    Bilirubin Total 0.2 0.2 - 1.3 mg/dL    Albumin 3.1 (L) 3.4 - 5.0 g/dL    Protein Total 6.5 (L) 6.8 - 8.8 g/dL    Alkaline Phosphatase 91 40 - 150 U/L    ALT 15 0 - 70 U/L    AST 20 0 - 45 U/L   Ferritin   Result Value Ref Range    Ferritin 36 26 - 388 ng/mL

## 2019-03-01 NOTE — PATIENT INSTRUCTIONS
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  Continue to hold omeprazole and treat gastroesophageal reflux with only Ranitidine.  We will ask pharmacist in medical therapeutic management whether it would be reasonable to try adding a medication such as Carafate.  I would also recommend holding gabapentin as this could cause the rash too.  We will also check labs again today as you could possibly stop taking iron replacement.     Plan: CBC with platelets, Comprehensive metabolic         panel, Ferritin            (I48.0) Paroxysmal atrial fibrillation (H)  Comment: Continue warfarin for now and consider follow up in cardiology   Plan:     (I26.99) Pulmonary embolism, bilateral (H)  Comment: as above   Plan:     (J44.9) Chronic obstructive pulmonary disease, unspecified COPD type (H)  Comment: Continue albuterol.  Spiriva will remain available at the pharmacy, but not filled today  Plan:     (C85.90) Non-Hodgkin's lymphoma, unspecified body region, unspecified non-Hodgkin lymphoma type (H)  Comment: check complete blood counts and comprehensive metabolic panel today   Plan: CBC with platelets, Comprehensive metabolic         panel            (R91.8) Pulmonary nodules  Comment: Noted that it does not appear that you have followed up in oncology for this nodule and a follow up CT scan is recommended - [facility] phone #[phone]   Plan: CT Chest w/o Contrast            (D50.0) Iron deficiency anemia due to chronic blood loss  Comment: Check iron levels and complete blood counts today  Plan: Ferritin            (G62.9) Peripheral polyneuropathy  Comment: Hold gabapentin for rash  Plan:

## 2019-03-04 ENCOUNTER — HOSPITAL ENCOUNTER (OUTPATIENT)
Dept: CT IMAGING | Facility: CLINIC | Age: 84
Discharge: HOME | End: 2019-03-04
Attending: INTERNAL MEDICINE | Admitting: INTERNAL MEDICINE
Payer: COMMERCIAL

## 2019-03-04 DIAGNOSIS — R91.8 PULMONARY NODULES: ICD-10-CM

## 2019-03-04 PROCEDURE — 71250 CT THORAX DX C-: CPT

## 2019-03-05 ENCOUNTER — TELEPHONE (OUTPATIENT)
Dept: FAMILY MEDICINE | Facility: CLINIC | Age: 84
End: 2019-03-05

## 2019-03-05 NOTE — TELEPHONE ENCOUNTER
I spoke to Hermilo MERCY Velasquez and informed him of the CT finding of the lung opacity and I also recommended that he be followed up in oncology with Dr. Bradley.  He was agreeable to this plan.  I then phoned the oncology office and asked them to schedule him which they agreed.    He also was concerned about increased itching and asked for a course of steroids which I declined at this time.  I will call his dermatologist and asked them to speak to him and prescribe prednisone if they feel it is appropriate.    Bucky Boone MD

## 2019-03-18 ENCOUNTER — TRANSFERRED RECORDS (OUTPATIENT)
Dept: HEALTH INFORMATION MANAGEMENT | Facility: CLINIC | Age: 84
End: 2019-03-18

## 2019-03-20 ENCOUNTER — MEDICAL CORRESPONDENCE (OUTPATIENT)
Dept: HEALTH INFORMATION MANAGEMENT | Facility: CLINIC | Age: 84
End: 2019-03-20

## 2019-03-20 ENCOUNTER — OFFICE VISIT (OUTPATIENT)
Dept: FAMILY MEDICINE | Facility: CLINIC | Age: 84
End: 2019-03-20
Payer: COMMERCIAL

## 2019-03-20 VITALS
HEART RATE: 66 BPM | TEMPERATURE: 98.5 F | OXYGEN SATURATION: 91 % | HEIGHT: 70 IN | WEIGHT: 180 LBS | SYSTOLIC BLOOD PRESSURE: 137 MMHG | BODY MASS INDEX: 25.77 KG/M2 | DIASTOLIC BLOOD PRESSURE: 66 MMHG

## 2019-03-20 DIAGNOSIS — D47.2 MONOCLONAL PARAPROTEINEMIA: ICD-10-CM

## 2019-03-20 DIAGNOSIS — R91.8 PULMONARY NODULES: ICD-10-CM

## 2019-03-20 DIAGNOSIS — D50.9 IRON DEFICIENCY ANEMIA, UNSPECIFIED IRON DEFICIENCY ANEMIA TYPE: ICD-10-CM

## 2019-03-20 DIAGNOSIS — Z01.818 PREOP GENERAL PHYSICAL EXAM: Primary | ICD-10-CM

## 2019-03-20 LAB — HGB BLD-MCNC: 12.6 G/DL (ref 13.3–17.7)

## 2019-03-20 PROCEDURE — 00000402 ZZHCL STATISTIC TOTAL PROTEIN: Performed by: NURSE PRACTITIONER

## 2019-03-20 PROCEDURE — 85018 HEMOGLOBIN: CPT | Performed by: NURSE PRACTITIONER

## 2019-03-20 PROCEDURE — 93000 ELECTROCARDIOGRAM COMPLETE: CPT | Performed by: NURSE PRACTITIONER

## 2019-03-20 PROCEDURE — 99215 OFFICE O/P EST HI 40 MIN: CPT | Performed by: NURSE PRACTITIONER

## 2019-03-20 PROCEDURE — 82784 ASSAY IGA/IGD/IGG/IGM EACH: CPT | Performed by: NURSE PRACTITIONER

## 2019-03-20 PROCEDURE — 82784 ASSAY IGA/IGD/IGG/IGM EACH: CPT | Mod: 59 | Performed by: NURSE PRACTITIONER

## 2019-03-20 PROCEDURE — 84165 PROTEIN E-PHORESIS SERUM: CPT | Performed by: NURSE PRACTITIONER

## 2019-03-20 PROCEDURE — 36415 COLL VENOUS BLD VENIPUNCTURE: CPT | Performed by: NURSE PRACTITIONER

## 2019-03-20 RX ORDER — FERROUS SULFATE 325(65) MG
325 TABLET ORAL EVERY OTHER DAY
Qty: 60 TABLET | Refills: 3 | Status: SHIPPED | OUTPATIENT
Start: 2019-03-20 | End: 2020-04-13

## 2019-03-20 ASSESSMENT — MIFFLIN-ST. JEOR: SCORE: 1502.72

## 2019-03-20 NOTE — PATIENT INSTRUCTIONS
Before Your Surgery      Call your surgeon if there is any change in your health. This includes signs of a cold or flu (such as a sore throat, runny nose, cough, rash or fever).    Do not smoke, drink alcohol or take over the counter medicine (unless your surgeon or primary care doctor tells you to) for the 24 hours before and after surgery.    If you take prescribed drugs: Follow your doctor s orders about which medicines to take and which to stop until after surgery.    Eating and drinking prior to surgery: follow the instructions from your surgeon    Take a shower or bath the night before surgery. Use the soap your surgeon gave you to gently clean your skin. If you do not have soap from your surgeon, use your regular soap. Do not shave or scrub the surgery site.  Wear clean pajamas and have clean sheets on your bed.     Take the iron 1 pill every other day. This is a change.     Hold your supplements morning of surgery

## 2019-03-20 NOTE — PROGRESS NOTES
[facility]  [address] [place] [id number]  [phone]  Dept: [phone]    PRE-OP EVALUATION:  Today's date: 3/20/2019    Hermilo Velasquez (: 11/3/1932) presents for pre-operative evaluation assessment as requested by Dr. Vasques.  He requires evaluation and anesthesia risk assessment prior to undergoing surgery/procedure for treatment of lump on the lung .    Proposed Surgery/ Procedure: RIGHT THORACOTOMY, WEDGE RESECTION, RIGHT LOWER LOBE LUNG NODULE, POSS RIGHT LOWER LOBECTOMY  Date of Surgery/ Procedure: 3/26/19  Time of Surgery/ Procedure: 9:00am  Hospital/Surgical Facility: [facility]  Fax number for surgical facility: Viewable in epic  Primary Physician: Karson Bishop  Type of Anesthesia Anticipated: General    Patient has a Health Care Directive or Living Will:  NO    1. NO - Do you have a history of heart attack, stroke, stent, bypass or surgery on an artery in the head, neck, heart or legs?  2. NO - Do you ever have any pain or discomfort in your chest?  3. NO - Do you have a history of  Heart Failure?  4. NO - Are you troubled by shortness of breath when: walking on the level, up a slight hill or at night?  5. NO - Do you currently have a cold, bronchitis or other respiratory infection?  6. NO - Do you have a cough, shortness of breath or wheezing?  7. NO - Do you sometimes get pains in the calves of your legs when you walk?  8. NO - Do you or anyone in your family have previous history of blood clots?  9. NO - Do you or does anyone in your family have a serious bleeding problem such as prolonged bleeding following surgeries or cuts?  10. YES - Have you ever had problems with anemia or been told to take iron pills? Currently on iron.  11. NO - Have you had any abnormal blood loss such as black, tarry or bloody stools, or abnormal vaginal bleeding?  12. YES - Have you ever had a blood transfusion? Roughly 3 years ago  13. NO - Have you or any of your relatives ever had  problems with anesthesia?  14. NO - Do you have sleep apnea, excessive snoring or daytime drowsiness?  15. YES - Do you have any prosthetic heart valves? Aortic valve   16. NO - Do you have prosthetic joints?  17. NO - Is there any chance that you may be pregnant?      HPI:     HPI related to upcoming procedure: recently found lung nodule. Going for resection.   Feeling well lately without copmlaints. Breathing is at baseline.       See problem list for active medical problems.  Problems all longstanding and stable, except as noted/documented.  See ROS for pertinent symptoms related to these conditions.                                                                                                                                                          .    MEDICAL HISTORY:     Patient Active Problem List    Diagnosis Date Noted     Spongiotic dermatitis 03/01/2019     Priority: Medium     Cervical pain 12/26/2018     Priority: Medium     Neck stiffness 12/26/2018     Priority: Medium     Iron deficiency anemia due to chronic blood loss 08/30/2018     Priority: Medium     Chronic obstructive pulmonary disease, unspecified COPD type (H) 03/26/2018     Priority: Medium     Hyperlipidemia LDL goal <130 01/10/2018     Priority: Medium     ACP (advance care planning) 10/24/2016     Priority: Medium     Advance Care Planning 10/24/2016: Receipt of ACP document:  Received: Resuscitation Guidelines order which was signed and dated by provider on 8-22-16.  Document previously scanned on 8-22-16.  Order reviewed and found to be valid.  Code Status reflects choices in most recent ACP document.  Confirmed/documented designated decision maker(s).  Added by Heather Reyes RN Advance Care Planning Liaison with [employer]             Lower extremity edema 10/07/2016     Priority: Medium     Leg edema, left 10/07/2016     Priority: Medium     S/P total knee arthroplasty 10/07/2016     Priority: Medium     Drainage from wound:  left Knee 08/30/2016     Priority: Medium     Rash 08/29/2016     Priority: Medium     Aftercare following left knee joint replacement surgery 08/22/2016     Priority: Medium     Primary osteoarthritis of left knee 08/19/2016     Priority: Medium     Essential hypertension with goal blood pressure less than 140/90 08/19/2016     Priority: Medium     Non-Hodgkin's lymphoma, unspecified body region, unspecified non-Hodgkin lymphoma type (H) 08/19/2016     Priority: Medium     Anticoagulated on Coumadin 08/19/2016     Priority: Medium     Other chronic pulmonary embolism without acute cor pulmonale (H) 08/19/2016     Priority: Medium     Epistaxis 08/19/2016     Priority: Medium     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 08/19/2016     Priority: Medium     Benign neoplasm of colon 06/14/2016     Priority: Medium     Benign neoplasm of colon, unspecified part of colon 06/10/2016     Priority: Medium     Pulmonary nodules 06/10/2016     Priority: Medium     Long-term (current) use of anticoagulants [Z79.01] 02/24/2016     Priority: Medium     S/P IVC filter 02/16/2016     Priority: Medium     Removed 10/2016       Pulmonary embolism, bilateral (H) 02/10/2016     Priority: Medium     Primary osteoarthritis of both knees 01/12/2016     Priority: Medium     Primary osteoarthritis of left hip 01/12/2016     Priority: Medium     Gastrointestinal hemorrhage with melena 11/27/2015     Priority: Medium     Need for SBE (subacute bacterial endocarditis) prophylaxis      Priority: Medium     RBBB (right bundle branch block)      Priority: Medium     Physical deconditioning 09/14/2015     Priority: Medium     Paroxysmal atrial fibrillation (H) 09/14/2015     Priority: Medium     Post-op 2015       Anemia due to blood loss, acute 09/11/2015     Priority: Medium     Neuropathy 07/06/2015     Priority: Medium     History of colonic polyps 10/17/2014     Priority: Medium     CARDIOVASCULAR SCREENING; LDL GOAL LESS THAN 130  05/17/2014     Priority: Medium     Health Care Home 08/21/2013     Priority: Medium     Tiana Beatty BS, RN, PHN  [facility] Care Coordinator  [phone]                        Microscopic hematuria 08/19/2013     Priority: Medium     C[facility] 08/2013       Non Hodgkin's lymphoma (H): 2013 06/10/2013     Priority: Medium     Dr. Bradley       Nonrheumatic aortic valve stenosis      Priority: Medium      9/2015 AVR - [facility] TRIFECTA Bovine bioprosthesis 25MM TF-25A         GERD (gastroesophageal reflux disease)      Priority: Medium     Ventral hernia 04/17/2013     Priority: Medium     Low back pain 12/11/2008     Priority: Medium     Diagnosis updated by automated process. Provider to review and confirm.        Past Medical History:   Diagnosis Date     Aortic stenosis     Severe AS, 9/2015 AVR - [facility] TRIFECTA Bovine bioprosthesis 25MM TF-25A     Atrial fibrillation (H)     9/2015 Paroxysmal post op Afib - discharged on Warfarin and a beta blocker     Deep vein thrombosis (H)      GERD (gastroesophageal reflux disease)      Heart murmur      Monoclonal gammopathy     plasmacyte prominent causing monoclonal gammopathy     Need for SBE (subacute bacterial endocarditis) prophylaxis      Neuropathy      Other and unspecified hyperlipidemia      Other malignant lymphomas     non hodgkin's lymphoma     RBBB (right bundle branch block)      Severe sepsis with acute organ dysfunction (H) 11/16/2015     Unspecified hereditary and idiopathic peripheral neuropathy      Past Surgical History:   Procedure Laterality Date     APPENDECTOMY       AS TOTAL KNEE ARTHROPLASTY       BACK SURGERY       ESOPHAGOSCOPY, GASTROSCOPY, DUODENOSCOPY (EGD), COMBINED N/A 11/28/2015    Procedure: COMBINED ESOPHAGOSCOPY, GASTROSCOPY, DUODENOSCOPY (EGD);  Surgeon: Danis Castillo MD;  Location: [facility]     ESOPHAGOSCOPY, GASTROSCOPY, DUODENOSCOPY (EGD), COMBINED N/A 7/26/2018    Procedure: COMBINED ESOPHAGOSCOPY,  GASTROSCOPY, DUODENOSCOPY (EGD);;  Surgeon: Toby Dong DO;  Location:  GI     HERNIA REPAIR  2006     HERNIORRHAPHY VENTRAL  4/17/2013    Procedure: HERNIORRHAPHY VENTRAL;  VENTRAL HERNIA REPAIR WITH MESH;  Surgeon: Patel Guzman MD;  Location:  OR     KNEE SURGERY      arthroscopic right knee surgery      REPAIR LIGAMENT ANKLE  2/23/2012    Procedure:REPAIR LIGAMENT ANKLE; LEFT TARSAL TUNNEL RELEASE OF KNOT OF ARIEL RELEASE; Surgeon:SAUL PEUNTE; Location: OR     REPLACE VALVE AORTIC N/A 9/3/2015    Procedure: REPLACE VALVE AORTIC;  Surgeon: Antonino Mitchell MD;  Location:  OR     ROTATOR CUFF REPAIR RT/LT      bilateral     SPINE SURGERY      3 spine surgeries     TONSILLECTOMY       TURP       Current Outpatient Medications   Medication Sig Dispense Refill     acetaminophen (TYLENOL) 325 MG tablet Take 2 tablets (650 mg) by mouth every 4 hours as needed for mild pain 100 tablet      albuterol (PROAIR HFA/PROVENTIL HFA/VENTOLIN HFA) 108 (90 Base) MCG/ACT Inhaler Inhale 1-2 puffs into the lungs every 6 hours as needed for shortness of breath / dyspnea or wheezing 3 Inhaler 5     atorvastatin (LIPITOR) 10 MG tablet Take 1 tablet (10 mg) by mouth daily 90 tablet 0     camphor-menthol (DERMASARRA) 0.5-0.5 % LOTN Apply a thin layer to itchy areas as often as desired 444 mL 11     ferrous sulfate (IRON) 325 (65 Fe) MG tablet Take 1 tablet (325 mg) by mouth 2 times daily 180 tablet 3     hydrOXYzine (ATARAX) 25 MG tablet Take 2 tablets (50 mg) by mouth every 6 hours as needed for itching 120 tablet 3     loratadine (CLARITIN) 10 MG tablet Take 10 mg by mouth daily       metoprolol succinate (TOPROL-XL) 100 MG 24 hr tablet Take 0.5 tablets (50 mg) by mouth daily 90 tablet 3     ranitidine (ZANTAC) 300 MG tablet Take 1 tablet (300 mg) by mouth 2 times daily 180 tablet 3     tiotropium (SPIRIVA HANDIHALER) 18 MCG capsule Inhale 1 capsule (18 mcg) into the lungs daily 90 capsule  "3     triamcinolone (KENALOG) 0.1 % cream Apply topically 2 times daily as needed for irritation       warfarin (COUMADIN) 5 MG tablet TAKE ONE TABLET BY MOUTH ONE TIME DAILY  90 tablet 0     warfarin (COUMADIN) 5 MG tablet Take 1 tablet (5 mg) by mouth daily Or as directed by INR clinic 90 tablet 0     OTC products: None, except as noted above    Allergies   Allergen Reactions     Gabapentin      Swelling       Lidocaine Blisters     Allergy to lidocaine ointment     Omeprazole Itching     Pantoprazole Itching     Prevacid [Lansoprazole] Itching     Lasix [Furosemide] Rash     Penicillins Rash     \"broke out from injection\" 60 yrs ago        Latex Allergy: NO    Social History     Tobacco Use     Smoking status: Former Smoker     Packs/day: 2.00     Years: 55.00     Pack years: 110.00     Last attempt to quit: 1998     Years since quittin.1     Smokeless tobacco: Never Used   Substance Use Topics     Alcohol use: Yes     Alcohol/week: 0.0 oz     Comment: 1 drink per day     History   Drug Use No       REVIEW OF SYSTEMS:   Constitutional, neuro, ENT, endocrine, pulmonary, cardiac, gastrointestinal, genitourinary, musculoskeletal, integument and psychiatric systems are negative, except as otherwise noted.    EXAM:   /66 (BP Location: Right arm, Patient Position: Chair, Cuff Size: Adult Regular)   Pulse 66   Temp 98.5  F (36.9  C) (Tympanic)   Ht 1.778 m (5' 10\")   Wt 81.6 kg (180 lb)   SpO2 91%   BMI 25.83 kg/m      GENERAL APPEARANCE: healthy, alert and no distress     EYES: EOMI,  PERRL     NECK: no adenopathy, no asymmetry, masses, or scars and thyroid normal to palpation     RESP:diminished throughout with faint scattered crackles     CV: regular rates and rhythm, normal S1 S2, no S3 or S4 and no murmur, click or rub     MS: extremities normal- no gross deformities noted, no evidence of inflammation in joints, FROM in all extremities.     SKIN: no suspicious lesions or rashes     NEURO: " Normal strength and tone, sensory exam grossly normal, mentation intact and speech normal     PSYCH: mentation appears normal. and affect normal/bright     LYMPHATICS: No cervical adenopathy    DIAGNOSTICS:     EKG: Normal Sinus Rhythm, Right Bundle Branch Block, unchanged from previous tracings  Labs Resulted Today:   Results for orders placed or performed in visit on 03/20/19   Hemoglobin   Result Value Ref Range    Hemoglobin 12.6 (L) 13.3 - 17.7 g/dL   Immunoglobulins A G and M   Result Value Ref Range     695 - 1,620 mg/dL     70 - 380 mg/dL    IGM 74 60 - 265 mg/dL   Protein electrophoresis   Result Value Ref Range    Albumin Fraction 3.0 (L) 3.7 - 5.1 g/dL    Alpha 1 Fraction 0.3 0.2 - 0.4 g/dL    Alpha 2 Fraction 0.7 0.5 - 0.9 g/dL    Beta Fraction 0.7 0.6 - 1.0 g/dL    Gamma Fraction 0.7 0.7 - 1.6 g/dL    Monoclonal Peak 0.0 0.0 g/dL    ELP Interpretation:       Hypoalbuminemia. No monoclonal protein seen. Pathologic significance requires clinical   correlation.  KB Walters M.D., Ph.D., Pathologist.          Recent Labs   Lab Test 03/01/19  0928 02/25/19 01/30/19 08/30/18  1207  07/31/18  1104 07/29/18  1050  09/04/15  0407   HGB 12.7*  --   --   --  10.6*  --  7.5*  --    < > 9.4*     --   --   --  Verified by smear review  --  280  --    < > 104*   INR  --  2.9* 2.2*   < >  --    < > 1.90* 1.55*   < >  --      --   --   --   --   --   --  136   < > 143   POTASSIUM 4.4  --   --   --   --   --   --  4.0   < > 4.1   CR 1.07  --   --   --   --   --   --  1.07   < > 0.78   A1C  --   --   --   --   --   --  5.8*  --   --  5.5    < > = values in this interval not displayed.        IMPRESSION:   Reason for surgery/procedure: lung nodule  Diagnosis/reason for consult: medical preop    The proposed surgical procedure is considered INTERMEDIATE risk.    REVISED CARDIAC RISK INDEX  The patient has the following serious cardiovascular risks for perioperative complications such as (MI,  PE, VFib and 3  AV Block):  No serious cardiac risks  INTERPRETATION: 0 risks: Class I (very low risk - 0.4% complication rate)    The patient has the following additional risks for perioperative complications:  No identified additional risks      ICD-10-CM    1. Preop general physical exam Z01.818 EKG 12-lead complete w/read - Clinics     Hemoglobin     Immunoglobulins A G and M     Protein electrophoresis     CANCELED: IgG     CANCELED: IgA   2. Pulmonary nodules R91.8    3. Iron deficiency anemia, unspecified iron deficiency anemia type D50.9 ferrous sulfate (FEROSUL) 325 (65 Fe) MG tablet     Immunoglobulins A G and M     Protein electrophoresis     CANCELED: IgG     CANCELED: IgA   4. 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       --Patient is to take all scheduled medications on the day of surgery EXCEPT for modifications listed below.    Anticoagulant or Antiplatelet Medication Use  WARFARIN: Stopped yesterday 3/19     Hold supplements morning of surgery         APPROVAL GIVEN to proceed with proposed procedure, without further diagnostic evaluation       Signed Electronically by: ILYA Lopez CNP    Copy of this evaluation report is provided to requesting physician.    [facility] Preop Guidelines    Revised Cardiac Risk Index

## 2019-03-21 ENCOUNTER — HOSPITAL ENCOUNTER (OUTPATIENT)
Dept: CARDIAC REHAB | Facility: CLINIC | Age: 84
End: 2019-03-21
Attending: THORACIC SURGERY (CARDIOTHORACIC VASCULAR SURGERY)
Payer: COMMERCIAL

## 2019-03-21 DIAGNOSIS — R06.00 DYSPNEA, UNSPECIFIED TYPE: ICD-10-CM

## 2019-03-21 LAB
ALBUMIN SERPL ELPH-MCNC: 3 G/DL (ref 3.7–5.1)
ALPHA1 GLOB SERPL ELPH-MCNC: 0.3 G/DL (ref 0.2–0.4)
ALPHA2 GLOB SERPL ELPH-MCNC: 0.7 G/DL (ref 0.5–0.9)
B-GLOBULIN SERPL ELPH-MCNC: 0.7 G/DL (ref 0.6–1)
GAMMA GLOB SERPL ELPH-MCNC: 0.7 G/DL (ref 0.7–1.6)
IGA SERPL-MCNC: 194 MG/DL (ref 70–380)
IGG SERPL-MCNC: 758 MG/DL (ref 695–1620)
IGM SERPL-MCNC: 74 MG/DL (ref 60–265)
M PROTEIN SERPL ELPH-MCNC: 0 G/DL
PROT PATTERN SERPL ELPH-IMP: ABNORMAL

## 2019-03-21 PROCEDURE — 94060 EVALUATION OF WHEEZING: CPT

## 2019-03-21 PROCEDURE — 40001038 ZZH STATISTIC RESPIRATORY TESTING VISIT

## 2019-03-21 PROCEDURE — 94726 PLETHYSMOGRAPHY LUNG VOLUMES: CPT

## 2019-03-21 PROCEDURE — 94729 DIFFUSING CAPACITY: CPT

## 2019-03-25 ENCOUNTER — ANTICOAGULATION THERAPY VISIT (OUTPATIENT)
Dept: NURSING | Facility: CLINIC | Age: 84
End: 2019-03-25
Payer: COMMERCIAL

## 2019-03-25 DIAGNOSIS — I48.0 PAROXYSMAL ATRIAL FIBRILLATION (H): ICD-10-CM

## 2019-03-25 DIAGNOSIS — I26.99 PULMONARY EMBOLISM, BILATERAL (H): ICD-10-CM

## 2019-03-25 LAB
DLCOCOR-%PRED-PRE: 48 %
DLCOCOR-PRE: 10.33 ML/MIN/MMHG
DLCOUNC-%PRED-PRE: 45 %
DLCOUNC-PRE: 9.7 ML/MIN/MMHG
DLCOUNC-PRED: 21.12 ML/MIN/MMHG
ERV-%PRED-PRE: 92 %
ERV-PRE: 0.52 L
ERV-PRED: 0.56 L
EXPTIME-PRE: 7.35 SEC
FEF2575-%PRED-POST: 65 %
FEF2575-%PRED-PRE: 36 %
FEF2575-POST: 1.11 L/SEC
FEF2575-PRE: 0.61 L/SEC
FEF2575-PRED: 1.68 L/SEC
FEFMAX-%PRED-PRE: 67 %
FEFMAX-PRE: 3.84 L/SEC
FEFMAX-PRED: 5.67 L/SEC
FEV1-%PRED-PRE: 59 %
FEV1-PRE: 1.44 L
FEV1FEV6-PRE: 56 %
FEV1FEV6-PRED: 75 %
FEV1FVC-PRE: 54 %
FEV1FVC-PRED: 75 %
FEV1SVC-PRE: 48 %
FEV1SVC-PRED: 64 %
FIFMAX-PRE: 2.55 L/SEC
FRCPLETH-%PRED-PRE: 119 %
FRCPLETH-PRE: 4.36 L
FRCPLETH-PRED: 3.64 L
FVC-%PRED-PRE: 81 %
FVC-PRE: 2.68 L
FVC-PRED: 3.27 L
IC-%PRED-PRE: 77 %
IC-PRE: 2.51 L
IC-PRED: 3.22 L
INR POINT OF CARE: 1.1 (ref 0.86–1.14)
RVPLETH-%PRED-PRE: 133 %
RVPLETH-PRE: 3.84 L
RVPLETH-PRED: 2.87 L
TLCPLETH-%PRED-PRE: 107 %
TLCPLETH-PRE: 6.87 L
TLCPLETH-PRED: 6.42 L
VA-%PRED-PRE: 98 %
VA-PRE: 5.38 L
VC-%PRED-PRE: 80 %
VC-PRE: 3.03 L
VC-PRED: 3.78 L

## 2019-03-25 PROCEDURE — 99207 ZZC NO CHARGE NURSE ONLY: CPT

## 2019-03-25 PROCEDURE — 85610 PROTHROMBIN TIME: CPT | Mod: QW

## 2019-03-25 PROCEDURE — 36416 COLLJ CAPILLARY BLOOD SPEC: CPT

## 2019-03-25 RX ORDER — GABAPENTIN 100 MG/1
100 CAPSULE ORAL 2 TIMES DAILY
COMMUNITY
End: 2019-03-25

## 2019-03-25 NOTE — PROGRESS NOTES
ANTICOAGULATION FOLLOW-UP CLINIC VISIT    Patient Name:  Hermilo Velasquez  Date:  3/25/2019  Contact Type:  Face to Face    SUBJECTIVE:     Patient Findings     Positives:   Upcoming invasive procedure (Right lung surgery upcoming - holding warfarin  )           OBJECTIVE    INR Protime   Date Value Ref Range Status   2019 1.1 0.86 - 1.14 Final       ASSESSMENT / PLAN  INR assessment SUB    Recheck INR In: 10 DAYS    INR Location Clinic      Anticoagulation Summary  As of 3/25/2019    INR goal:   2.0-3.0   TTR:   49.1 % (3 y)   INR used for dosin.1! (3/25/2019)   Warfarin maintenance plan:   5 mg (5 mg x 1) every day   Full warfarin instructions:   5 mg every day   Weekly warfarin total:   35 mg   Weekly max warfarin dose:   45 mg   Plan last modified:   Dione Giron RN (2019)   Next INR check:   4/3/2019   Target end date:   Indefinite    Indications    Long-term (current) use of anticoagulants [Z79.01] [Z79.01]  Pulmonary embolism  bilateral (H) [I26.99]  Paroxysmal atrial fibrillation (H) [I48.0]             Anticoagulation Episode Summary     INR check location:   [facility]    Preferred lab:       Send INR reminders to:   [facility] ANTICOAGULATION    Comments:         Anticoagulation Care Providers     Provider Role Specialty Phone number    Karson Bishop MD [facility] [phone]            See the Encounter Report to view Anticoagulation Flowsheet and Dosing Calendar (Go to Encounters tab in chart review, and find the Anticoagulation Therapy Visit)    HOLDING for upcoming lung surgery for tomorrow. Pt anticipates he will be in the hospital post-procedure for several days. If needs to reschedule his INR, he will call.     Pt aware if signs of clotting (pain, tenderness, swelling, color change in leg or arm, SOB) and bleeding occur (blood in stool, urine, large bruising, bleeding gums, nosebleeds) to have INR check sooner. If sx severe report to ER or  concerned for stroke call 911. If general questions or concerns arise, call clinic.    Yamilex Gutierrez RN

## 2019-03-26 ENCOUNTER — HOSPITAL ENCOUNTER (INPATIENT)
Facility: CLINIC | Age: 84
LOS: 3 days | Discharge: HOME | DRG: 164 | End: 2019-03-29
Attending: THORACIC SURGERY (CARDIOTHORACIC VASCULAR SURGERY) | Admitting: THORACIC SURGERY (CARDIOTHORACIC VASCULAR SURGERY)
Payer: COMMERCIAL

## 2019-03-26 ENCOUNTER — APPOINTMENT (OUTPATIENT)
Dept: GENERAL RADIOLOGY | Facility: CLINIC | Age: 84
DRG: 164 | End: 2019-03-26
Attending: THORACIC SURGERY (CARDIOTHORACIC VASCULAR SURGERY)
Payer: COMMERCIAL

## 2019-03-26 ENCOUNTER — ANESTHESIA EVENT (OUTPATIENT)
Dept: SURGERY | Facility: CLINIC | Age: 84
DRG: 164 | End: 2019-03-26
Payer: COMMERCIAL

## 2019-03-26 ENCOUNTER — ANESTHESIA (OUTPATIENT)
Dept: SURGERY | Facility: CLINIC | Age: 84
DRG: 164 | End: 2019-03-26
Payer: COMMERCIAL

## 2019-03-26 DIAGNOSIS — M25.561 RIGHT KNEE PAIN, UNSPECIFIED CHRONICITY: ICD-10-CM

## 2019-03-26 DIAGNOSIS — G89.18 ACUTE POST-OPERATIVE PAIN: Primary | ICD-10-CM

## 2019-03-26 DIAGNOSIS — R91.1 NODULE OF LOWER LOBE OF RIGHT LUNG: ICD-10-CM

## 2019-03-26 LAB
ABO + RH BLD: NORMAL
ABO + RH BLD: NORMAL
ANION GAP SERPL CALCULATED.3IONS-SCNC: 6 MMOL/L (ref 3–14)
BLD GP AB SCN SERPL QL: NORMAL
BLOOD BANK CMNT PATIENT-IMP: NORMAL
BUN SERPL-MCNC: 23 MG/DL (ref 7–30)
CALCIUM SERPL-MCNC: 9.1 MG/DL (ref 8.5–10.1)
CHLORIDE SERPL-SCNC: 111 MMOL/L (ref 94–109)
CO2 SERPL-SCNC: 26 MMOL/L (ref 20–32)
CREAT SERPL-MCNC: 1.08 MG/DL (ref 0.66–1.25)
ERYTHROCYTE [DISTWIDTH] IN BLOOD BY AUTOMATED COUNT: 14.1 % (ref 10–15)
GFR SERPL CREATININE-BSD FRML MDRD: 62 ML/MIN/{1.73_M2}
GLUCOSE BLDC GLUCOMTR-MCNC: 125 MG/DL (ref 70–99)
GLUCOSE SERPL-MCNC: 97 MG/DL (ref 70–99)
HCT VFR BLD AUTO: 39.3 % (ref 40–53)
HGB BLD-MCNC: 12.7 G/DL (ref 13.3–17.7)
INR PPP: 1.02 (ref 0.86–1.14)
MCH RBC QN AUTO: 28.7 PG (ref 26.5–33)
MCHC RBC AUTO-ENTMCNC: 32.3 G/DL (ref 31.5–36.5)
MCV RBC AUTO: 89 FL (ref 78–100)
PLATELET # BLD AUTO: 151 10E9/L (ref 150–450)
POTASSIUM SERPL-SCNC: 4.2 MMOL/L (ref 3.4–5.3)
RBC # BLD AUTO: 4.42 10E12/L (ref 4.4–5.9)
SODIUM SERPL-SCNC: 143 MMOL/L (ref 133–144)
SPECIMEN EXP DATE BLD: NORMAL
WBC # BLD AUTO: 8.3 10E9/L (ref 4–11)

## 2019-03-26 PROCEDURE — 71000012 ZZH RECOVERY PHASE 1 LEVEL 1 FIRST HR: Performed by: THORACIC SURGERY (CARDIOTHORACIC VASCULAR SURGERY)

## 2019-03-26 PROCEDURE — 12000000 ZZH R&B MED SURG/OB

## 2019-03-26 PROCEDURE — 85610 PROTHROMBIN TIME: CPT | Performed by: THORACIC SURGERY (CARDIOTHORACIC VASCULAR SURGERY)

## 2019-03-26 PROCEDURE — 36415 COLL VENOUS BLD VENIPUNCTURE: CPT | Performed by: THORACIC SURGERY (CARDIOTHORACIC VASCULAR SURGERY)

## 2019-03-26 PROCEDURE — 80048 BASIC METABOLIC PNL TOTAL CA: CPT | Performed by: THORACIC SURGERY (CARDIOTHORACIC VASCULAR SURGERY)

## 2019-03-26 PROCEDURE — 25000125 ZZHC RX 250: Performed by: PHYSICIAN ASSISTANT

## 2019-03-26 PROCEDURE — 40000170 ZZH STATISTIC PRE-PROCEDURE ASSESSMENT II: Performed by: THORACIC SURGERY (CARDIOTHORACIC VASCULAR SURGERY)

## 2019-03-26 PROCEDURE — 99232 SBSQ HOSP IP/OBS MODERATE 35: CPT | Performed by: PHYSICIAN ASSISTANT

## 2019-03-26 PROCEDURE — 88331 PATH CONSLTJ SURG 1 BLK 1SPC: CPT | Performed by: THORACIC SURGERY (CARDIOTHORACIC VASCULAR SURGERY)

## 2019-03-26 PROCEDURE — 94799 UNLISTED PULMONARY SVC/PX: CPT

## 2019-03-26 PROCEDURE — 27110038 ZZH RX 271: Performed by: THORACIC SURGERY (CARDIOTHORACIC VASCULAR SURGERY)

## 2019-03-26 PROCEDURE — 88307 TISSUE EXAM BY PATHOLOGIST: CPT | Mod: 26 | Performed by: THORACIC SURGERY (CARDIOTHORACIC VASCULAR SURGERY)

## 2019-03-26 PROCEDURE — 71045 X-RAY EXAM CHEST 1 VIEW: CPT

## 2019-03-26 PROCEDURE — 36000063 ZZH SURGERY LEVEL 4 EA 15 ADDTL MIN: Performed by: THORACIC SURGERY (CARDIOTHORACIC VASCULAR SURGERY)

## 2019-03-26 PROCEDURE — 25800030 ZZH RX IP 258 OP 636: Performed by: NURSE ANESTHETIST, CERTIFIED REGISTERED

## 2019-03-26 PROCEDURE — 25000132 ZZH RX MED GY IP 250 OP 250 PS 637: Performed by: PHYSICIAN ASSISTANT

## 2019-03-26 PROCEDURE — 99207 ZZC CDG-HISTORY COMP: MEETS EXP. PROBLEM FOCUSED - DOWN CODED LACK OF HPI: CPT | Performed by: PHYSICIAN ASSISTANT

## 2019-03-26 PROCEDURE — 86901 BLOOD TYPING SEROLOGIC RH(D): CPT | Performed by: THORACIC SURGERY (CARDIOTHORACIC VASCULAR SURGERY)

## 2019-03-26 PROCEDURE — 25000128 H RX IP 250 OP 636: Performed by: NURSE ANESTHETIST, CERTIFIED REGISTERED

## 2019-03-26 PROCEDURE — 25000125 ZZHC RX 250: Performed by: NURSE ANESTHETIST, CERTIFIED REGISTERED

## 2019-03-26 PROCEDURE — 85027 COMPLETE CBC AUTOMATED: CPT | Performed by: THORACIC SURGERY (CARDIOTHORACIC VASCULAR SURGERY)

## 2019-03-26 PROCEDURE — 37000008 ZZH ANESTHESIA TECHNICAL FEE, 1ST 30 MIN: Performed by: THORACIC SURGERY (CARDIOTHORACIC VASCULAR SURGERY)

## 2019-03-26 PROCEDURE — 25000566 ZZH SEVOFLURANE, EA 15 MIN: Performed by: THORACIC SURGERY (CARDIOTHORACIC VASCULAR SURGERY)

## 2019-03-26 PROCEDURE — 27210794 ZZH OR GENERAL SUPPLY STERILE: Performed by: THORACIC SURGERY (CARDIOTHORACIC VASCULAR SURGERY)

## 2019-03-26 PROCEDURE — 00000146 ZZHCL STATISTIC GLUCOSE BY METER IP

## 2019-03-26 PROCEDURE — 25000128 H RX IP 250 OP 636: Performed by: ANESTHESIOLOGY

## 2019-03-26 PROCEDURE — 25800030 ZZH RX IP 258 OP 636: Performed by: PHYSICIAN ASSISTANT

## 2019-03-26 PROCEDURE — 86850 RBC ANTIBODY SCREEN: CPT | Performed by: THORACIC SURGERY (CARDIOTHORACIC VASCULAR SURGERY)

## 2019-03-26 PROCEDURE — 25000128 H RX IP 250 OP 636: Performed by: THORACIC SURGERY (CARDIOTHORACIC VASCULAR SURGERY)

## 2019-03-26 PROCEDURE — 25800030 ZZH RX IP 258 OP 636: Performed by: ANESTHESIOLOGY

## 2019-03-26 PROCEDURE — 40000275 ZZH STATISTIC RCP TIME EA 10 MIN

## 2019-03-26 PROCEDURE — 88331 PATH CONSLTJ SURG 1 BLK 1SPC: CPT | Mod: 26 | Performed by: THORACIC SURGERY (CARDIOTHORACIC VASCULAR SURGERY)

## 2019-03-26 PROCEDURE — 88307 TISSUE EXAM BY PATHOLOGIST: CPT | Performed by: THORACIC SURGERY (CARDIOTHORACIC VASCULAR SURGERY)

## 2019-03-26 PROCEDURE — 25000125 ZZHC RX 250: Performed by: THORACIC SURGERY (CARDIOTHORACIC VASCULAR SURGERY)

## 2019-03-26 PROCEDURE — 25000128 H RX IP 250 OP 636: Performed by: PHYSICIAN ASSISTANT

## 2019-03-26 PROCEDURE — 86900 BLOOD TYPING SEROLOGIC ABO: CPT | Performed by: THORACIC SURGERY (CARDIOTHORACIC VASCULAR SURGERY)

## 2019-03-26 PROCEDURE — 37000009 ZZH ANESTHESIA TECHNICAL FEE, EACH ADDTL 15 MIN: Performed by: THORACIC SURGERY (CARDIOTHORACIC VASCULAR SURGERY)

## 2019-03-26 PROCEDURE — 36000093 ZZH SURGERY LEVEL 4 1ST 30 MIN: Performed by: THORACIC SURGERY (CARDIOTHORACIC VASCULAR SURGERY)

## 2019-03-26 PROCEDURE — 0BBF0ZZ EXCISION OF RIGHT LOWER LUNG LOBE, OPEN APPROACH: ICD-10-PCS | Performed by: THORACIC SURGERY (CARDIOTHORACIC VASCULAR SURGERY)

## 2019-03-26 PROCEDURE — 71000013 ZZH RECOVERY PHASE 1 LEVEL 1 EA ADDTL HR: Performed by: THORACIC SURGERY (CARDIOTHORACIC VASCULAR SURGERY)

## 2019-03-26 RX ORDER — ACETAMINOPHEN 500 MG
1000 TABLET ORAL ONCE
Status: DISCONTINUED | OUTPATIENT
Start: 2019-03-26 | End: 2019-03-26

## 2019-03-26 RX ORDER — FENTANYL CITRATE 50 UG/ML
25-50 INJECTION, SOLUTION INTRAMUSCULAR; INTRAVENOUS
Status: DISCONTINUED | OUTPATIENT
Start: 2019-03-26 | End: 2019-03-26 | Stop reason: HOSPADM

## 2019-03-26 RX ORDER — EPHEDRINE SULFATE 50 MG/ML
INJECTION, SOLUTION INTRAMUSCULAR; INTRAVENOUS; SUBCUTANEOUS PRN
Status: DISCONTINUED | OUTPATIENT
Start: 2019-03-26 | End: 2019-03-26

## 2019-03-26 RX ORDER — BISACODYL 5 MG
15 TABLET, DELAYED RELEASE (ENTERIC COATED) ORAL DAILY PRN
Status: DISCONTINUED | OUTPATIENT
Start: 2019-03-26 | End: 2019-03-29 | Stop reason: HOSPADM

## 2019-03-26 RX ORDER — FERROUS SULFATE 325(65) MG
325 TABLET ORAL AT BEDTIME
Status: DISCONTINUED | OUTPATIENT
Start: 2019-03-26 | End: 2019-03-29 | Stop reason: HOSPADM

## 2019-03-26 RX ORDER — METOPROLOL SUCCINATE 50 MG/1
50 TABLET, EXTENDED RELEASE ORAL DAILY
Status: DISCONTINUED | OUTPATIENT
Start: 2019-03-27 | End: 2019-03-29 | Stop reason: HOSPADM

## 2019-03-26 RX ORDER — KETOROLAC TROMETHAMINE 15 MG/ML
15 INJECTION, SOLUTION INTRAMUSCULAR; INTRAVENOUS ONCE
Status: COMPLETED | OUTPATIENT
Start: 2019-03-26 | End: 2019-03-26

## 2019-03-26 RX ORDER — NALOXONE HYDROCHLORIDE 0.4 MG/ML
.1-.4 INJECTION, SOLUTION INTRAMUSCULAR; INTRAVENOUS; SUBCUTANEOUS
Status: DISCONTINUED | OUTPATIENT
Start: 2019-03-26 | End: 2019-03-26

## 2019-03-26 RX ORDER — ONDANSETRON 2 MG/ML
4 INJECTION INTRAMUSCULAR; INTRAVENOUS EVERY 6 HOURS PRN
Status: DISCONTINUED | OUTPATIENT
Start: 2019-03-26 | End: 2019-03-29 | Stop reason: HOSPADM

## 2019-03-26 RX ORDER — CLINDAMYCIN PHOSPHATE 900 MG/50ML
900 INJECTION, SOLUTION INTRAVENOUS SEE ADMIN INSTRUCTIONS
Status: DISCONTINUED | OUTPATIENT
Start: 2019-03-26 | End: 2019-03-26 | Stop reason: HOSPADM

## 2019-03-26 RX ORDER — BISACODYL 5 MG
5 TABLET, DELAYED RELEASE (ENTERIC COATED) ORAL DAILY PRN
Status: DISCONTINUED | OUTPATIENT
Start: 2019-03-26 | End: 2019-03-29 | Stop reason: HOSPADM

## 2019-03-26 RX ORDER — CALCIUM CARBONATE 500 MG/1
1000 TABLET, CHEWABLE ORAL 4 TIMES DAILY PRN
Status: DISCONTINUED | OUTPATIENT
Start: 2019-03-26 | End: 2019-03-29 | Stop reason: HOSPADM

## 2019-03-26 RX ORDER — PROCHLORPERAZINE MALEATE 5 MG
5 TABLET ORAL EVERY 6 HOURS PRN
Status: DISCONTINUED | OUTPATIENT
Start: 2019-03-26 | End: 2019-03-29 | Stop reason: HOSPADM

## 2019-03-26 RX ORDER — BISACODYL 10 MG
10 SUPPOSITORY, RECTAL RECTAL DAILY PRN
Status: DISCONTINUED | OUTPATIENT
Start: 2019-03-26 | End: 2019-03-29 | Stop reason: HOSPADM

## 2019-03-26 RX ORDER — NALOXONE HYDROCHLORIDE 0.4 MG/ML
.1-.4 INJECTION, SOLUTION INTRAMUSCULAR; INTRAVENOUS; SUBCUTANEOUS
Status: DISCONTINUED | OUTPATIENT
Start: 2019-03-26 | End: 2019-03-29 | Stop reason: HOSPADM

## 2019-03-26 RX ORDER — AMOXICILLIN 250 MG
2 CAPSULE ORAL 2 TIMES DAILY
Status: DISCONTINUED | OUTPATIENT
Start: 2019-03-26 | End: 2019-03-29 | Stop reason: HOSPADM

## 2019-03-26 RX ORDER — SODIUM CHLORIDE, SODIUM LACTATE, POTASSIUM CHLORIDE, CALCIUM CHLORIDE 600; 310; 30; 20 MG/100ML; MG/100ML; MG/100ML; MG/100ML
INJECTION, SOLUTION INTRAVENOUS CONTINUOUS
Status: DISCONTINUED | OUTPATIENT
Start: 2019-03-26 | End: 2019-03-26 | Stop reason: HOSPADM

## 2019-03-26 RX ORDER — ONDANSETRON 2 MG/ML
INJECTION INTRAMUSCULAR; INTRAVENOUS PRN
Status: DISCONTINUED | OUTPATIENT
Start: 2019-03-26 | End: 2019-03-26

## 2019-03-26 RX ORDER — GABAPENTIN 100 MG/1
100 CAPSULE ORAL 2 TIMES DAILY
Status: DISCONTINUED | OUTPATIENT
Start: 2019-03-26 | End: 2019-03-29 | Stop reason: HOSPADM

## 2019-03-26 RX ORDER — ONDANSETRON 4 MG/1
4 TABLET, ORALLY DISINTEGRATING ORAL EVERY 6 HOURS PRN
Status: DISCONTINUED | OUTPATIENT
Start: 2019-03-26 | End: 2019-03-29 | Stop reason: HOSPADM

## 2019-03-26 RX ORDER — NITROGLYCERIN 0.4 MG/1
0.4 TABLET SUBLINGUAL EVERY 5 MIN PRN
Status: DISCONTINUED | OUTPATIENT
Start: 2019-03-26 | End: 2019-03-27

## 2019-03-26 RX ORDER — HYDROMORPHONE HYDROCHLORIDE 1 MG/ML
.2-.3 INJECTION, SOLUTION INTRAMUSCULAR; INTRAVENOUS; SUBCUTANEOUS
Status: DISCONTINUED | OUTPATIENT
Start: 2019-03-26 | End: 2019-03-29 | Stop reason: HOSPADM

## 2019-03-26 RX ORDER — PROPOFOL 10 MG/ML
INJECTION, EMULSION INTRAVENOUS PRN
Status: DISCONTINUED | OUTPATIENT
Start: 2019-03-26 | End: 2019-03-26

## 2019-03-26 RX ORDER — LIDOCAINE HYDROCHLORIDE 20 MG/ML
INJECTION, SOLUTION INFILTRATION; PERINEURAL PRN
Status: DISCONTINUED | OUTPATIENT
Start: 2019-03-26 | End: 2019-03-26

## 2019-03-26 RX ORDER — DEXAMETHASONE SODIUM PHOSPHATE 4 MG/ML
INJECTION, SOLUTION INTRA-ARTICULAR; INTRALESIONAL; INTRAMUSCULAR; INTRAVENOUS; SOFT TISSUE PRN
Status: DISCONTINUED | OUTPATIENT
Start: 2019-03-26 | End: 2019-03-26

## 2019-03-26 RX ORDER — ONDANSETRON 4 MG/1
4 TABLET, ORALLY DISINTEGRATING ORAL EVERY 30 MIN PRN
Status: DISCONTINUED | OUTPATIENT
Start: 2019-03-26 | End: 2019-03-26 | Stop reason: HOSPADM

## 2019-03-26 RX ORDER — CLINDAMYCIN PHOSPHATE 900 MG/50ML
900 INJECTION, SOLUTION INTRAVENOUS
Status: COMPLETED | OUTPATIENT
Start: 2019-03-26 | End: 2019-03-26

## 2019-03-26 RX ORDER — ACETAMINOPHEN 325 MG/1
650 TABLET ORAL EVERY 4 HOURS PRN
Status: DISCONTINUED | OUTPATIENT
Start: 2019-03-29 | End: 2019-03-29 | Stop reason: HOSPADM

## 2019-03-26 RX ORDER — LORATADINE 10 MG/1
10 TABLET ORAL EVERY MORNING
Status: DISCONTINUED | OUTPATIENT
Start: 2019-03-26 | End: 2019-03-29 | Stop reason: HOSPADM

## 2019-03-26 RX ORDER — HYDROMORPHONE HYDROCHLORIDE 1 MG/ML
.3-.5 INJECTION, SOLUTION INTRAMUSCULAR; INTRAVENOUS; SUBCUTANEOUS EVERY 5 MIN PRN
Status: DISCONTINUED | OUTPATIENT
Start: 2019-03-26 | End: 2019-03-26 | Stop reason: HOSPADM

## 2019-03-26 RX ORDER — HYDROXYZINE HYDROCHLORIDE 25 MG/1
50 TABLET, FILM COATED ORAL AT BEDTIME
Status: DISCONTINUED | OUTPATIENT
Start: 2019-03-26 | End: 2019-03-29 | Stop reason: HOSPADM

## 2019-03-26 RX ORDER — SODIUM CHLORIDE 9 MG/ML
INJECTION, SOLUTION INTRAVENOUS CONTINUOUS
Status: DISCONTINUED | OUTPATIENT
Start: 2019-03-26 | End: 2019-03-29 | Stop reason: HOSPADM

## 2019-03-26 RX ORDER — VECURONIUM BROMIDE 1 MG/ML
INJECTION, POWDER, LYOPHILIZED, FOR SOLUTION INTRAVENOUS PRN
Status: DISCONTINUED | OUTPATIENT
Start: 2019-03-26 | End: 2019-03-26

## 2019-03-26 RX ORDER — AMOXICILLIN 250 MG
1 CAPSULE ORAL 2 TIMES DAILY
Status: DISCONTINUED | OUTPATIENT
Start: 2019-03-26 | End: 2019-03-29 | Stop reason: HOSPADM

## 2019-03-26 RX ORDER — BUPIVACAINE HYDROCHLORIDE 5 MG/ML
INJECTION, SOLUTION PERINEURAL PRN
Status: DISCONTINUED | OUTPATIENT
Start: 2019-03-26 | End: 2019-03-26 | Stop reason: HOSPADM

## 2019-03-26 RX ORDER — ACETAMINOPHEN 325 MG/1
975 TABLET ORAL EVERY 8 HOURS
Status: COMPLETED | OUTPATIENT
Start: 2019-03-26 | End: 2019-03-29

## 2019-03-26 RX ORDER — ONDANSETRON 2 MG/ML
4 INJECTION INTRAMUSCULAR; INTRAVENOUS EVERY 30 MIN PRN
Status: DISCONTINUED | OUTPATIENT
Start: 2019-03-26 | End: 2019-03-26 | Stop reason: HOSPADM

## 2019-03-26 RX ORDER — GABAPENTIN 100 MG/1
100 CAPSULE ORAL 2 TIMES DAILY
COMMUNITY
End: 2019-05-13

## 2019-03-26 RX ORDER — ALBUTEROL SULFATE 0.83 MG/ML
2.5 SOLUTION RESPIRATORY (INHALATION)
Status: DISCONTINUED | OUTPATIENT
Start: 2019-03-26 | End: 2019-03-29 | Stop reason: HOSPADM

## 2019-03-26 RX ORDER — IBUPROFEN 600 MG/1
600 TABLET, FILM COATED ORAL 4 TIMES DAILY
Status: DISCONTINUED | OUTPATIENT
Start: 2019-03-26 | End: 2019-03-26

## 2019-03-26 RX ORDER — ALBUTEROL SULFATE 90 UG/1
1-2 AEROSOL, METERED RESPIRATORY (INHALATION) EVERY 6 HOURS PRN
Status: DISCONTINUED | OUTPATIENT
Start: 2019-03-26 | End: 2019-03-29 | Stop reason: HOSPADM

## 2019-03-26 RX ORDER — ATORVASTATIN CALCIUM 10 MG/1
10 TABLET, FILM COATED ORAL DAILY
Status: DISCONTINUED | OUTPATIENT
Start: 2019-03-26 | End: 2019-03-29 | Stop reason: HOSPADM

## 2019-03-26 RX ORDER — GINSENG 100 MG
CAPSULE ORAL 3 TIMES DAILY
Status: DISCONTINUED | OUTPATIENT
Start: 2019-03-26 | End: 2019-03-29 | Stop reason: HOSPADM

## 2019-03-26 RX ORDER — FENTANYL CITRATE 50 UG/ML
INJECTION, SOLUTION INTRAMUSCULAR; INTRAVENOUS PRN
Status: DISCONTINUED | OUTPATIENT
Start: 2019-03-26 | End: 2019-03-26

## 2019-03-26 RX ORDER — BISACODYL 5 MG
10 TABLET, DELAYED RELEASE (ENTERIC COATED) ORAL DAILY PRN
Status: DISCONTINUED | OUTPATIENT
Start: 2019-03-26 | End: 2019-03-29 | Stop reason: HOSPADM

## 2019-03-26 RX ORDER — POLYETHYLENE GLYCOL 3350 17 G/17G
17 POWDER, FOR SOLUTION ORAL DAILY PRN
Status: DISCONTINUED | OUTPATIENT
Start: 2019-03-26 | End: 2019-03-29 | Stop reason: HOSPADM

## 2019-03-26 RX ADMIN — HYDROMORPHONE HYDROCHLORIDE 0.5 MG: 1 INJECTION, SOLUTION INTRAMUSCULAR; INTRAVENOUS; SUBCUTANEOUS at 10:36

## 2019-03-26 RX ADMIN — HYDROMORPHONE HYDROCHLORIDE 2 MG: 2 TABLET ORAL at 18:12

## 2019-03-26 RX ADMIN — ONDANSETRON 4 MG: 2 INJECTION INTRAMUSCULAR; INTRAVENOUS at 09:54

## 2019-03-26 RX ADMIN — DEXMEDETOMIDINE HYDROCHLORIDE 8 MCG: 100 INJECTION, SOLUTION INTRAVENOUS at 09:24

## 2019-03-26 RX ADMIN — CALCIUM CARBONATE (ANTACID) CHEW TAB 500 MG 1000 MG: 500 CHEW TAB at 20:28

## 2019-03-26 RX ADMIN — PHENYLEPHRINE HYDROCHLORIDE 100 MCG: 10 INJECTION, SOLUTION INTRAMUSCULAR; INTRAVENOUS; SUBCUTANEOUS at 09:14

## 2019-03-26 RX ADMIN — HYDROMORPHONE HYDROCHLORIDE 1 MG: 2 TABLET ORAL at 15:06

## 2019-03-26 RX ADMIN — DEXAMETHASONE SODIUM PHOSPHATE 4 MG: 4 INJECTION, SOLUTION INTRA-ARTICULAR; INTRALESIONAL; INTRAMUSCULAR; INTRAVENOUS; SOFT TISSUE at 09:10

## 2019-03-26 RX ADMIN — SENNOSIDES AND DOCUSATE SODIUM 1 TABLET: 8.6; 5 TABLET ORAL at 20:28

## 2019-03-26 RX ADMIN — PROPOFOL 50 MG: 10 INJECTION, EMULSION INTRAVENOUS at 09:06

## 2019-03-26 RX ADMIN — SUGAMMADEX 100 MG: 100 INJECTION, SOLUTION INTRAVENOUS at 10:06

## 2019-03-26 RX ADMIN — SODIUM CHLORIDE, POTASSIUM CHLORIDE, SODIUM LACTATE AND CALCIUM CHLORIDE: 600; 310; 30; 20 INJECTION, SOLUTION INTRAVENOUS at 11:18

## 2019-03-26 RX ADMIN — ACETAMINOPHEN 975 MG: 325 TABLET, FILM COATED ORAL at 13:09

## 2019-03-26 RX ADMIN — Medication 5 MG: at 09:50

## 2019-03-26 RX ADMIN — SODIUM CHLORIDE, POTASSIUM CHLORIDE, SODIUM LACTATE AND CALCIUM CHLORIDE: 600; 310; 30; 20 INJECTION, SOLUTION INTRAVENOUS at 08:17

## 2019-03-26 RX ADMIN — LIDOCAINE HYDROCHLORIDE 100 MG: 20 INJECTION, SOLUTION INFILTRATION; PERINEURAL at 08:59

## 2019-03-26 RX ADMIN — VECURONIUM BROMIDE 2 MG: 1 INJECTION, POWDER, LYOPHILIZED, FOR SOLUTION INTRAVENOUS at 09:13

## 2019-03-26 RX ADMIN — UMECLIDINIUM 1 PUFF: 62.5 AEROSOL, POWDER ORAL at 15:05

## 2019-03-26 RX ADMIN — KETOROLAC TROMETHAMINE 15 MG: 15 INJECTION, SOLUTION INTRAMUSCULAR; INTRAVENOUS at 10:46

## 2019-03-26 RX ADMIN — FENTANYL CITRATE 50 MCG: 50 INJECTION, SOLUTION INTRAMUSCULAR; INTRAVENOUS at 08:53

## 2019-03-26 RX ADMIN — SUGAMMADEX 50 MG: 100 INJECTION, SOLUTION INTRAVENOUS at 10:02

## 2019-03-26 RX ADMIN — SODIUM CHLORIDE: 9 INJECTION, SOLUTION INTRAVENOUS at 13:10

## 2019-03-26 RX ADMIN — GABAPENTIN 100 MG: 100 CAPSULE ORAL at 20:27

## 2019-03-26 RX ADMIN — SENNOSIDES AND DOCUSATE SODIUM 1 TABLET: 8.6; 5 TABLET ORAL at 13:09

## 2019-03-26 RX ADMIN — DEXMEDETOMIDINE HYDROCHLORIDE 4 MCG: 100 INJECTION, SOLUTION INTRAVENOUS at 09:26

## 2019-03-26 RX ADMIN — CLINDAMYCIN PHOSPHATE 900 MG: 18 INJECTION, SOLUTION INTRAVENOUS at 09:07

## 2019-03-26 RX ADMIN — HYDROMORPHONE HYDROCHLORIDE 2 MG: 2 TABLET ORAL at 21:33

## 2019-03-26 RX ADMIN — ROCURONIUM BROMIDE 10 MG: 10 INJECTION INTRAVENOUS at 09:13

## 2019-03-26 RX ADMIN — PROPOFOL 100 MG: 10 INJECTION, EMULSION INTRAVENOUS at 09:00

## 2019-03-26 RX ADMIN — FENTANYL CITRATE 50 MCG: 50 INJECTION, SOLUTION INTRAMUSCULAR; INTRAVENOUS at 09:16

## 2019-03-26 RX ADMIN — LORATADINE 10 MG: 10 TABLET ORAL at 13:09

## 2019-03-26 RX ADMIN — ROCURONIUM BROMIDE 40 MG: 10 INJECTION INTRAVENOUS at 09:01

## 2019-03-26 RX ADMIN — ATORVASTATIN CALCIUM 10 MG: 10 TABLET, FILM COATED ORAL at 21:32

## 2019-03-26 RX ADMIN — GABAPENTIN 100 MG: 100 CAPSULE ORAL at 13:09

## 2019-03-26 RX ADMIN — SUGAMMADEX 50 MG: 100 INJECTION, SOLUTION INTRAVENOUS at 10:05

## 2019-03-26 RX ADMIN — ACETAMINOPHEN 975 MG: 325 TABLET, FILM COATED ORAL at 21:32

## 2019-03-26 RX ADMIN — RANITIDINE 150 MG: 150 TABLET ORAL at 20:28

## 2019-03-26 RX ADMIN — HYDROXYZINE HYDROCHLORIDE 50 MG: 25 TABLET, FILM COATED ORAL at 21:32

## 2019-03-26 RX ADMIN — FERROUS SULFATE TAB 325 MG (65 MG ELEMENTAL FE) 325 MG: 325 (65 FE) TAB at 21:32

## 2019-03-26 RX ADMIN — BACITRACIN: 500 OINTMENT TOPICAL at 21:37

## 2019-03-26 ASSESSMENT — MIFFLIN-ST. JEOR: SCORE: 1470.97

## 2019-03-26 ASSESSMENT — COPD QUESTIONNAIRES: COPD: 1

## 2019-03-26 ASSESSMENT — ACTIVITIES OF DAILY LIVING (ADL)
ADLS_ACUITY_SCORE: 12
ADLS_ACUITY_SCORE: 12

## 2019-03-26 ASSESSMENT — ENCOUNTER SYMPTOMS
ORTHOPNEA: 0
DYSRHYTHMIAS: 1

## 2019-03-26 NOTE — PROGRESS NOTES
"Admission medication history interview status for the 3/26/2019  admission is complete. See EPIC admission navigator for prior to admission medications     Medication history source reliability:Poor    Medication history interview source(s):Patient    Medication history resources (including written lists, pill bottles, clinic record): mailed in list    Primary pharmacy. [facility] [address]    Additional medication history information not noted on PTA med list :    Called [facility] pharmacy to confirm Zantac dose. Zantac 300mg was last filled in August 2018 and pt said he picked up 150mg from [facility] pharmacy. Couldn't confirm dose     Pt taking Gabapentin 100mg twice a day however, it is listed as an allergy. Pt claims he is fine when he takes it and said \"Doctors sometimes tell you not to take meds and then call back to tell you its okay to take\"    Pt taking differently           Tylenol 325mg every 4 hours prn  (Instead of Tylenol 650mg every 4 hours prn)            Ferrous Sulfate 325mg every evening (instead of Ferrous Sulfate 325mg every other day)            Hydroxyzine 50mg every evening (instead of 50mg every 6 hours)           Ranitidine 150mg every morning ( instead of 300mg twice a day)    Time spent in this activity: 45 mins    Prior to Admission medications    Medication Sig Last Dose Taking? Auth Provider   acetaminophen (TYLENOL) 325 MG tablet Take 2 tablets (650 mg) by mouth every 4 hours as needed for mild pain  Patient taking differently: Take 325 mg by mouth every 4 hours as needed for mild pain  a month ago at prn Yes Echo Pineda,    albuterol (PROAIR HFA/PROVENTIL HFA/VENTOLIN HFA) 108 (90 Base) MCG/ACT Inhaler Inhale 1-2 puffs into the lungs every 6 hours as needed for shortness of breath / dyspnea or wheezing 3/26/2019 at 0530 Yes Karson Bishop MD   atorvastatin (LIPITOR) 10 MG tablet Take 1 tablet (10 mg) by mouth daily 3/25/2019 at 2200 Yes Tab Grijalva MD   camphor-menthol " (DERMASARRA) 0.5-0.5 % LOTN Apply a thin layer to itchy areas as often as desired  Patient taking differently: every morning Apply a thin layer to itchy areas as often as desired 3/26/2019 at 0530 Yes En Aviles MD   ferrous sulfate (FEROSUL) 325 (65 Fe) MG tablet Take 1 tablet (325 mg) by mouth every other day  Patient taking differently: Take 325 mg by mouth At Bedtime  3/25/2019 at 2000 Yes Nelson Mao APRN CNP   gabapentin (NEURONTIN) 100 MG capsule Take 100 mg by mouth 2 times daily 3/25/2019 at 2000 Yes Reported, Patient   hydrOXYzine (ATARAX) 25 MG tablet Take 2 tablets (50 mg) by mouth every 6 hours as needed for itching  Patient taking differently: Take 50 mg by mouth At Bedtime  3/25/2019 at 2000 Yes Karson Bishop MD   loratadine (CLARITIN) 10 MG tablet Take 10 mg by mouth every morning  3/25/2019 at 0800 Yes Reported, Patient   metoprolol succinate (TOPROL-XL) 100 MG 24 hr tablet Take 0.5 tablets (50 mg) by mouth daily 3/26/2019 at 0530 Yes Echo Pineda DO   ranitidine (ZANTAC) 300 MG tablet Take 1 tablet (300 mg) by mouth 2 times daily  Patient taking differently: Take 150 mg by mouth daily  3/26/2019 at 0530 Yes Karson Bishop MD   tiotropium (SPIRIVA HANDIHALER) 18 MCG capsule Inhale 1 capsule (18 mcg) into the lungs daily 3/12/2019 Yes Karson Bishop MD   triamcinolone (KENALOG) 0.1 % cream Apply topically 2 times daily as needed for irritation 3/12/2019 Yes Unknown, Entered By History   warfarin (COUMADIN) 5 MG tablet TAKE ONE TABLET BY MOUTH ONE TIME DAILY  3/19/2019 Yes Karson Bishop MD

## 2019-03-26 NOTE — ANESTHESIA PREPROCEDURE EVALUATION
Anesthesia Pre-Procedure Evaluation    Patient: Hermilo Velasquez   MRN: [id number] : 11/3/1932          Preoperative Diagnosis: RIGHT LOWER LOBE LUNG NODULE    Procedure(s):  RIGHT THORACOTOMY, WEDGE RESECTION, RIGHT LOWER LOBE LUNG NODULE, POSS RIGHT LOWER LOBECTOMY  POSSIBLE LOBECTOMY LUNG    Past Medical History:   Diagnosis Date     Aortic stenosis     Severe AS, 2015 AVR - [facility] TRIFECTA Bovine bioprosthesis 25MM TF-25A     Atrial fibrillation (H)     2015 Paroxysmal post op Afib - discharged on Warfarin and a beta blocker     Deep vein thrombosis (H)      GERD (gastroesophageal reflux disease)      Heart murmur      Monoclonal gammopathy     plasmacyte prominent causing monoclonal gammopathy     Need for SBE (subacute bacterial endocarditis) prophylaxis      Neuropathy      Other and unspecified hyperlipidemia      Other malignant lymphomas     non hodgkin's lymphoma     RBBB (right bundle branch block)      Severe sepsis with acute organ dysfunction (H) 2015     Unspecified hereditary and idiopathic peripheral neuropathy      Past Surgical History:   Procedure Laterality Date     APPENDECTOMY       AS TOTAL KNEE ARTHROPLASTY       BACK SURGERY       ESOPHAGOSCOPY, GASTROSCOPY, DUODENOSCOPY (EGD), COMBINED N/A 2015    Procedure: COMBINED ESOPHAGOSCOPY, GASTROSCOPY, DUODENOSCOPY (EGD);  Surgeon: Danis Castillo MD;  Location:  GI     ESOPHAGOSCOPY, GASTROSCOPY, DUODENOSCOPY (EGD), COMBINED N/A 2018    Procedure: COMBINED ESOPHAGOSCOPY, GASTROSCOPY, DUODENOSCOPY (EGD);;  Surgeon: Toby Dong DO;  Location:  GI     HERNIA REPAIR  2006     HERNIORRHAPHY VENTRAL  2013    Procedure: HERNIORRHAPHY VENTRAL;  VENTRAL HERNIA REPAIR WITH MESH;  Surgeon: Patel Guzman MD;  Location:  OR     KNEE SURGERY      arthroscopic right knee surgery      REPAIR LIGAMENT ANKLE  2012    Procedure:REPAIR LIGAMENT ANKLE; LEFT TARSAL TUNNEL RELEASE OF KNOT OF ARIEL  RELEASE; Surgeon:SAUL PUENTE; Location: OR     REPLACE VALVE AORTIC N/A 9/3/2015    Procedure: REPLACE VALVE AORTIC;  Surgeon: Antonino Mitchell MD;  Location:  OR     ROTATOR CUFF REPAIR RT/LT      bilateral     SPINE SURGERY      3 spine surgeries     TONSILLECTOMY       TURP         EKG - SR, RBBB, LAD    Echo 7/2018  Interpretation Summary     The left ventricle is normal in size.  The visual ejection fraction is estimated at 50-55%.  Diastolic Doppler findings (E/E' ratio and/or other parameters) suggest left  ventricular filling pressures are increased.  No regional wall motion abnormalities noted.  The right ventricle is moderately dilated.  Mildly decreased right ventricular systolic function  There is a bioprosthetic aortic valve.  The prosthetic aortic valve appears to open well.  The gradient is normal for this prosthetic aortic valve.  _____________________________________________________________________________  __        Left Ventricle  The left ventricle is normal in size. There is normal left ventricular wall  thickness. The visual ejection fraction is estimated at 50-55%. Diastolic  Doppler findings (E/E' ratio and/or other parameters) suggest left ventricular  filling pressures are increased. No regional wall motion abnormalities noted.     Right Ventricle  The right ventricle is moderately dilated. Mildly decreased right ventricular  systolic function.     Atria  The left atrium is severely dilated. The right atrium is moderately dilated.  There is no color Doppler evidence of an atrial shunt.     Mitral Valve  The mitral valve leaflets are moderately thickened. There is mild mitral  annular calcification. There is mild (1+) mitral regurgitation.        Tricuspid Valve  There is trace tricuspid regurgitation. Normal IVC (1.5-2.5cm) with >50%  respiratory collapse; right atrial pressure is estimated at 5-10mmHg.     Aortic Valve  There is trace aortic regurgitation. The mean  AoV pressure gradient is 11.2  mmHg. 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 The prosthetic aortic valve  appears to open well. The gradient is normal for this prosthetic aortic valve.     Pulmonic Valve  There is trace pulmonic valvular regurgitation.     Vessels  Normal size aorta. The aortic root is normal size.     Pericardium  There is no pericardial effusion.        Rhythm  Sinus rhythm was noted. The patient exhibited frequent PVCs.  _____________________________________________________________________________  Anesthesia Evaluation     . Pt has had prior anesthetic.     No history of anesthetic complications          ROS/MED HX    ENT/Pulmonary: Comment: Oxygen sat 91% on preop hx and physical    Significant response to bronchodilators on PFT's    (+)COPD, , . Other pulmonary disease right lower lung nodule.   (-) sleep apnea   Neurologic:     (+)neuropathy     Cardiovascular: Comment: S/p AVR for aortic stenosis 9/2015 with bovine bioprothesis    Lower extremity edema    RBBB    (+) Dyslipidemia, hypertension----. Taking blood thinners : . . . :. dysrhythmias a-fib, valvular problems/murmurs type: AS s/p AVR:. Previous cardiac testing Echodate:results:The left ventricle is normal in size.  The visual ejection fraction is estimated at 50-55%.  Diastolic Doppler findings (E/E' ratio and/or other parameters) suggest left  ventricular filling pressures are increased.  No regional wall motion abnormalities noted.  The right ventricle is moderately dilated.  Mildly decreased right ventricular systolic function  There is a bioprosthetic aortic valve.  The prosthetic aortic valve appears to open well.  The gradient is normal for this prosthetic aortic valve.date: results: date: results: date: results:         (-) CHF and orthopnea/PND   METS/Exercise Tolerance:     Hematologic: Comments: S/p IVC filter    (+) History of blood clots (DVT, and bilateral PEs) pt is anticoagulated, Anemia, Other Hematologic  "Disorder-mul;tiple myeloma      Musculoskeletal: Comment: Neck pain/ stiffness  (+) arthritis, , , -       GI/Hepatic: Comment: Hx of GI bleed    (+) GERD Asymptomatic on medication,       Renal/Genitourinary: Comment: ERIKA improved    (+) chronic renal disease, type: ARF,       Endo:     (+) type II DM .      Psychiatric:  - neg psychiatric ROS       Infectious Disease:         Malignancy:         Other:                          Physical Exam  Normal systems: cardiovascular and pulmonary    Airway   Mallampati: II  TM distance: >3 FB  Neck ROM: full    Dental   (+) upper dentures  Comment: Poor dentition lower jaw    Cardiovascular   Rhythm and rate: regular and normal      Pulmonary    breath sounds clear to auscultation            Lab Results   Component Value Date    WBC 8.1 03/01/2019    HGB 12.6 (L) 03/20/2019    HCT 41.1 03/01/2019     03/01/2019    CRP 12.0 (H) 02/21/2018    SED 33 (H) 01/10/2019     03/01/2019    POTASSIUM 4.4 03/01/2019    CHLORIDE 109 03/01/2019    CO2 27 03/01/2019    BUN 22 03/01/2019    CR 1.07 03/01/2019    GLC 86 03/01/2019    AMRITA 8.8 03/01/2019    PHOS 3.9 07/23/2018    MAG 2.1 07/23/2018    ALBUMIN 3.1 (L) 03/01/2019    PROTTOTAL 6.5 (L) 03/01/2019    ALT 15 03/01/2019    AST 20 03/01/2019    ALKPHOS 91 03/01/2019    BILITOTAL 0.2 03/01/2019    PTT 40 (H) 10/05/2016    INR 1.1 03/25/2019    FIBR 252 09/03/2015    TSH 2.88 07/31/2018    T4 1.21 02/21/2018       Preop Vitals  BP Readings from Last 3 Encounters:   03/20/19 137/66   03/01/19 132/62   01/10/19 114/68    Pulse Readings from Last 3 Encounters:   03/20/19 66   03/01/19 60   01/10/19 (!) 48      Resp Readings from Last 3 Encounters:   07/30/18 20   03/19/18 18   02/12/17 18    SpO2 Readings from Last 3 Encounters:   03/20/19 91%   03/01/19 96%   01/10/19 95%      Temp Readings from Last 1 Encounters:   03/20/19 36.9  C (98.5  F) (Tympanic)    Ht Readings from Last 1 Encounters:   03/20/19 1.778 m (5' 10\")    " "  Wt Readings from Last 1 Encounters:   03/20/19 81.6 kg (180 lb)    Estimated body mass index is 25.83 kg/m  as calculated from the following:    Height as of 3/20/19: 1.778 m (5' 10\").    Weight as of 3/20/19: 81.6 kg (180 lb).       Anesthesia Plan      History & Physical Review  History and physical reviewed and following examination; no interval change.    ASA Status:  3 .    NPO Status:  > 8 hours    Plan for General and ETT with Propofol induction. Maintenance will be Balanced.    PONV prophylaxis:  Ondansetron (or other 5HT-3)  Additional equipment: Double Lumen ETT and Fiberoptic bronchoscope      Postoperative Care  Postoperative pain management:  IV analgesics.      Consents  Anesthetic plan, risks, benefits and alternatives discussed with:  Patient..                 Alexander Shook MD  "

## 2019-03-26 NOTE — OP NOTE
Procedure Date: 03/26/2019      SURGEON:  Jose A Vasques MD.       FIRST ASSISTANT:  Olena Hernandez PA-C.       PREOPERATIVE DIAGNOSIS:  Right lower lobe lung nodule.      POSTOPERATIVE DIAGNOSIS:  Nonsmall cell carcinoma, right lower lobe lung.      PROCEDURE:  Limited right thoracotomy with wedge resection of right lower lobe lung nodule.      ANESTHESIA:  General with double-lumen endotracheal tube.      INDICATIONS:  An 86-year-old gentleman with a history of lymphoma.  He has some comorbidity.  He was found to have an enlarging nodule at the base of the right lower lobe lung laterally.  This nodule is suspicious for malignancy.  Options, diagnostic procedure and treatment were reviewed.  It was elected to proceed with limited thoracotomy and wedge resection.      DESCRIPTION OF PROCEDURE:  The patient was brought to the OR and placed in supine position.  Under general anesthesia with double-lumen endotracheal tube, the patient was placed in a left lateral decubitus position.  The right chest was prepared and draped in the usual fashion using ChloraPrep.  Ventilation of the right lung was discontinued.  Limited right posterolateral thoracotomy was made sparing the serratus anterior muscle, entering the pleural space in the fifth intercostal space preserving the integrity of the ribs.  There were some filmy adhesions that were taken down.  The lung was examined and the nodule was clearly identified.  Using multiple applications of Fisher 60 gold stapling device, a generous wedge resection with excellent margin was performed.  Staple line was hemostatic.  The specimen was submitted for frozen section and this revealed a non-small cell carcinoma.  The hilum was dissected posteriorly.  There was no evidence of adenopathy in the subcarinal space or at the inferior pulmonary ligament lymph nodes.  Because of the patient's comorbidity and the more than adequate wedge resection, it was elected not to proceed  any further with a right lower lobectomy.  Hemostasis was verified and was excellent.  The On-Q catheter was placed and 30 mL of Marcaine 0.5% without epinephrine was injected as intercostal block.  Then, through a separate stab wound, a 24-Yi Bjorn drain was placed and sutured to the skin with #2 silk suture.  The incision was closed with pericostal suture of Vicryl #1 and running #1 Vicryl in muscular layer, running 2-0 Vicryl in the subcutaneous tissues, closed with Insorb staples.  Estimated blood loss minimal.  Needle and count was correct.      Olena Hernandez PA-C, was the first assistant.  During the procedure, her role as first assistant was essential and necessary in accomplishing the steps of the procedure as described above, providing exposure and retraction.         BLANQUITA EMMANUEL MD             D: 2019   T: 2019   MT:       Name:     CLYDE RODRIGUEZ   MRN:      [id number]        Account:        [id number]   :      1932           Procedure Date: 2019      Document: [id number]

## 2019-03-26 NOTE — ANESTHESIA POSTPROCEDURE EVALUATION
Patient: Hermilo Velasquez    Procedure(s):  RIGHT THORACOTOMY, WEDGE RESECTION, RIGHT LOWER LOBE LUNG NODULE,  POSSIBLE LOBECTOMY LUNG    Diagnosis:RIGHT LOWER LOBE LUNG NODULE  Diagnosis Additional Information: No value filed.    Anesthesia Type:  General    Note:  Anesthesia Post Evaluation    Patient location during evaluation: PACU  Patient participation: Able to fully participate in evaluation  Level of consciousness: awake  Pain management: adequate  Airway patency: patent  Cardiovascular status: acceptable  Respiratory status: acceptable  Hydration status: acceptable  PONV: none     Anesthetic complications: None          Last vitals:  Vitals:    03/26/19 1600 03/26/19 1800 03/26/19 1812   BP: 103/69  122/64   Pulse: 59 57 59   Resp: 22 20 18   Temp:      SpO2: 94% 94% 95%         Electronically Signed By: Alexander Shook MD  March 26, 2019  6:23 PM

## 2019-03-26 NOTE — ANESTHESIA CARE TRANSFER NOTE
Patient: Hermilo Velasquez    Procedure(s):  RIGHT THORACOTOMY, WEDGE RESECTION, RIGHT LOWER LOBE LUNG NODULE, POSS RIGHT LOWER LOBECTOMY  POSSIBLE LOBECTOMY LUNG    Diagnosis: RIGHT LOWER LOBE LUNG NODULE  Diagnosis Additional Information: No value filed.    Anesthesia Type:   General     Note:  Airway :Face Mask  Patient transferred to:PACU  Comments: Neuromuscular blockade reversed after TOF 4/4, spontaneous respirations, adequate tidal volumes, followed commands to voice, oropharynx suctioned with soft flexible catheter, extubated atraumatically, extubated with suction, airway patent after extubation.  Oxygen via facemask at 10 liters per minute to PACU. Oxygen tubing connected to wall O2 in PACU, SpO2, NiBP, and EKG monitors and alarms on and functioning, Raymond Hugger warmer connected to patient gown, report on patient's clinical status given to PACU RN, RN questions answered. Handoff Report: Identifed the Patient, Identified the Reponsible Provider, Reviewed the pertinent medical history, Discussed the surgical course, Reviewed Intra-OP anesthesia mangement and issues during anesthesia, Set expectations for post-procedure period and Allowed opportunity for questions and acknowledgement of understanding      Vitals: (Last set prior to Anesthesia Care Transfer)    CRNA VITALS  3/26/2019 0943 - 3/26/2019 1018      3/26/2019             Resp Rate (observed):  16    EKG:  Sinus rhythm                Electronically Signed By: ILYA Olmedo CRNA  March 26, 2019  10:18 AM

## 2019-03-26 NOTE — CONSULTS
Consult Date:  03/26/2019      REQUESTING PROVIDER:  Olena Hernandez PA-C      REASON FOR CONSULTATION:  Medical comanagement after surgery.      HISTORY OF PRESENT ILLNESS:  Hermilo Velasquez is an 86-year-old male with a past medical history significant for neuropathy, COPD, a history of severe aortic stenosis, status post bovine bioprosthetic aortic valve replacement, non-Hodgkin's lymphoma with a history of DVT and PE, a history of paroxysmal atrial fibrillation, history of GERD and GI bleed, chronic kidney disease, CHF, and monoclonal gammopathy, who is postoperative day 0 status post a right thoracotomy with wedge resection of a right lower lobe lung nodule.  The patient was noted to have a lung nodule on CT back in 2016.  Imaging repeated earlier this month showed an enlarging right lower lobe nodule, prompting the above procedure.  The procedure was performed by Dr. Vasques under general anesthesia with an estimated blood loss of 25 mL.  No intraoperative complications have been identified.  Frozen section revealed adenocarcinoma.  The patient is presently evaluated in his room on the surgical floor.  He reports he is doing well after surgery.  He reports pain is presently well controlled and denies any shortness of breath.  He was started on a clear liquid diet and has not had much intake thus far.  He denies nausea or vomiting.  He denies dizziness or palpitations.      REVIEW OF SYSTEMS:  A 10-point review of systems was conducted and is negative aside from the information in the HPI.      PAST MEDICAL HISTORY:   1.  Neuropathy.   2.  Right bundle branch block.   3.  History of non-Hodgkin lymphoma, status post chemotherapy.   4.  Dyslipidemia.   5.  History of iron deficiency anemia.   6.  Monoclonal gammopathy.   7.  GERD.   8.  History of GI bleed, thought likely to be related to AV malformation.  Notably, he is ALLERGIC TO PPI and is on Zantac.   9.  History of DVT and PE, 2016, on chronic warfarin.    10.  History of postop AFib after an aortic valve replacement.   11.  History of severe aortic stenosis, status post [employer]sen bovine bioprosthetic aortic valve replacement in 2015.   12.  Chronic obstructive pulmonary disease.   13.  Congestive heart failure.  Last echocardiogram in 07/2018 showed EF of 50%-55% with mildly decreased right ventricular systolic function.      PAST SURGICAL HISTORY:    Past Surgical History:   Procedure Laterality Date     APPENDECTOMY       AS TOTAL KNEE ARTHROPLASTY       BACK SURGERY       ESOPHAGOSCOPY, GASTROSCOPY, DUODENOSCOPY (EGD), COMBINED N/A 11/28/2015    Procedure: COMBINED ESOPHAGOSCOPY, GASTROSCOPY, DUODENOSCOPY (EGD);  Surgeon: Danis Castillo MD;  Location:  GI     ESOPHAGOSCOPY, GASTROSCOPY, DUODENOSCOPY (EGD), COMBINED N/A 7/26/2018    Procedure: COMBINED ESOPHAGOSCOPY, GASTROSCOPY, DUODENOSCOPY (EGD);;  Surgeon: Toby Dong DO;  Location:  GI     HERNIA REPAIR  2006     HERNIORRHAPHY VENTRAL  4/17/2013    Procedure: HERNIORRHAPHY VENTRAL;  VENTRAL HERNIA REPAIR WITH MESH;  Surgeon: Patel Guzman MD;  Location:  OR     KNEE SURGERY      arthroscopic right knee surgery      LOBECTOMY LUNG Right 3/26/2019    Procedure: POSSIBLE LOBECTOMY LUNG;  Surgeon: Jose A Vasques MD;  Location:  OR     REPAIR LIGAMENT ANKLE  2/23/2012    Procedure:REPAIR LIGAMENT ANKLE; LEFT TARSAL TUNNEL RELEASE OF KNOT OF ARIEL RELEASE; Surgeon:SAUL PUENTE; Location: OR     REPLACE VALVE AORTIC N/A 9/3/2015    Procedure: REPLACE VALVE AORTIC;  Surgeon: Antonino Mitchell MD;  Location:  OR     ROTATOR CUFF REPAIR RT/LT      bilateral     SPINE SURGERY      3 spine surgeries     THORACOTOMY, WEDGE RESECTION LUNG, COMBINED Right 3/26/2019    Procedure: RIGHT THORACOTOMY, WEDGE RESECTION, RIGHT LOWER LOBE LUNG NODULE,;  Surgeon: Jose A Vasques MD;  Location:  OR     TONSILLECTOMY       TURP           ALLERGIES:   1.   GABAPENTIN.   2.  LIDOCAINE.   3.  OMEPRAZOLE.   4.  PANTOPRAZOLE.   5.  PREVACID.   6.  LASIX.   7.  PENICILLIN.      [id number] MEDICATIONS:    Prior to Admission medications    Medication Sig Last Dose Taking? Auth Provider   acetaminophen (TYLENOL) 325 MG tablet Take 2 tablets (650 mg) by mouth every 4 hours as needed for mild pain  Patient taking differently: Take 325 mg by mouth every 4 hours as needed for mild pain  a month ago at prn Yes Echo Pineda DO   albuterol (PROAIR HFA/PROVENTIL HFA/VENTOLIN HFA) 108 (90 Base) MCG/ACT Inhaler Inhale 1-2 puffs into the lungs every 6 hours as needed for shortness of breath / dyspnea or wheezing 3/26/2019 at 0530 Yes Karson Bishop MD   atorvastatin (LIPITOR) 10 MG tablet Take 1 tablet (10 mg) by mouth daily 3/25/2019 at 2200 Yes Tab Grijalva MD   camphor-menthol (DERMASARRA) 0.5-0.5 % LOTN Apply a thin layer to itchy areas as often as desired  Patient taking differently: every morning Apply a thin layer to itchy areas as often as desired 3/26/2019 at 0530 Yes En Aviles MD   ferrous sulfate (FEROSUL) 325 (65 Fe) MG tablet Take 1 tablet (325 mg) by mouth every other day  Patient taking differently: Take 325 mg by mouth At Bedtime  3/25/2019 at 2000 Yes Nelson Mao APRN CNP   gabapentin (NEURONTIN) 100 MG capsule Take 100 mg by mouth 2 times daily 3/25/2019 at 2000 Yes Reported, Patient   hydrOXYzine (ATARAX) 25 MG tablet Take 2 tablets (50 mg) by mouth every 6 hours as needed for itching  Patient taking differently: Take 50 mg by mouth At Bedtime  3/25/2019 at 2000 Yes Karson Bishop MD   loratadine (CLARITIN) 10 MG tablet Take 10 mg by mouth every morning  3/25/2019 at 0800 Yes Reported, Patient   metoprolol succinate (TOPROL-XL) 100 MG 24 hr tablet Take 0.5 tablets (50 mg) by mouth daily 3/26/2019 at 0530 Yes Echo Pineda DO   ranitidine (ZANTAC) 300 MG tablet Take 1 tablet (300 mg) by mouth 2 times  daily  Patient taking differently: Take 150 mg by mouth daily  3/26/2019 at 0530 Yes Karson Bishop MD   tiotropium (SPIRIVA HANDIHALER) 18 MCG capsule Inhale 1 capsule (18 mcg) into the lungs daily 3/12/2019 Yes Karson Bishop MD   triamcinolone (KENALOG) 0.1 % cream Apply topically 2 times daily as needed for irritation 3/12/2019 Yes Unknown, Entered By History   warfarin (COUMADIN) 5 MG tablet TAKE ONE TABLET BY MOUTH ONE TIME DAILY  3/19/2019 Yes Karson Bishop MD         SOCIAL HISTORY:  The patient reports rare alcohol use, denies drug use.  He is a former smoker.  He says he smoked 3 packs a day for probably 30 years and quit in 1985.  He lives in a house in [place] and uses a cane at baseline to ambulate.      FAMILY HISTORY:  Father had a history of COPD and coronary artery disease.      LABORATORY DATA:  BMP preop notable for creatinine of 1.08.  His GFR was estimated at 62.  White blood cell count 8.3, hemoglobin 12.7, hematocrit 39.3 and platelets of 151,000.  INR is 1.02.      PHYSICAL EXAMINATION:   VITAL SIGNS:  Temperature 98 degrees, heart rate 60, blood pressure 103/51, respiratory rate 14, oxygen saturation is 92% on 2 liters nasal cannula.   GENERAL:  Alert and oriented gentleman, sitting up in bed, appears comfortable and is appropriately conversant, slightly hard of hearing.   HEENT:  Pupils are equal and reactive to light, EOMI.  Mucous membranes are moist.   CARDIOVASCULAR:  Regular rate and rhythm, no murmurs appreciated.  A chest tube in place on the right.   RESPIRATORY:  Lungs are clear in anterior fields, diminished.  No increased work of breathing or wheezing.   GASTROINTESTINAL:  Positive bowel sounds.   ABDOMEN:  Soft and nontender.   SKIN:  Warm and dry.  Incision is dressed without dressing saturation.     MUSCULOSKELETAL:  The patient moves all 4 extremities.   NEUROLOGICAL:  Cranial nerves II-XII are grossly intact.  No focal deficits.   EXTREMITIES:  Warm, trace pedal  edema.      ASSESSMENT AND PLAN:  Hermilo Velasquez is an 86-year-old gentleman with a history of neuropathy, non-Hodgkin's lymphoma, dyslipidemia, a history of paroxysmal atrial fibrillation, history of deep vein thrombosis and PE, a history of severe aortic stenosis status post aortic valve replacement, chronic obstructive pulmonary disease, a history of gastrointestinal bleed, chronic kidney disease, and congestive heart failure, who is postoperative day 0 status post right thoracotomy with wedge resection for a right lower lobe lung nodule.  The Hospitalist Service was consulted for postoperative medical co-management.   1.  Postoperative day 0 status post limited right thoracotomy with wedge resection of a right lower lobe lung nodule.  Procedure was performed under general anesthesia with an EBL of 25 mL.  Frozen section showing adenocarcinoma.  There were no intraoperative complications.  The patient is hemodynamically stable and pain is well controlled.   -- Primary management per Thoracic Surgery including DVT prophylaxis and pain control.   -- Recommend resumption of warfarin when safe per Surgery.   -- Lovenox 40 mg every 24 hours scheduled to begin 03/27/2019.   -- Ibuprofen discontinued due to CKD and history of GI bleed.   -- NS 75 mL per hour, this should be discontinued when the patient is tolerating adequate p.o. intake.   -- Chest tube management per Surgery.   -- BMP and hemoglobin in the a.m.   2.  History of deep vein thrombosis and PE.  The patient has been holding warfarin prior to surgery as instructed.  Recommend that this be resumed when okay with Surgery.   3.  History of paroxysmal atrial fibrillation, noted postoperatively after the valve replacement surgery.  He was on metoprolol 50 mg daily prior to admission.   -- Continue metoprolol.   -- Telemetry for 24 hours.   -- Resume warfarin when okay with Surgery.   4.  Chronic obstructive pulmonary disease, non-oxygen dependent.  The patient  denies recent shortness of breath or wheezing.  We will continue his njzsj-cn-yyccxgyly Spiriva with albuterol p.r.n.   5.  History of gastrointestinal bleed, gastroesophageal reflux disease.  The patient denies recent melena.  He was on Zantac xthpm-ek-lsmanduyy due to PPI ALLERGY.  I have discontinued scheduled ibuprofen and recommend avoiding further Toradol.  Continue kinub-tt-bgtayufmt Zantac.   6.  History of non-Hodgkin's lymphoma.  Follow up with Oncology as directed.   7.  Neuropathy.  Continue wvugd-ji-afpumzjtr gabapentin.   8.  Dyslipidemia.  Continue qlfgx-qm-ugaktxiza atorvastatin.   9.  History of severe aortic stenosis, status post aortic valve replacement with [employer]sen bovine bioprosthetic valve.  Follow up with Cardiology in the outpatient setting.      The Hospitalist Service will continue to follow along.  We appreciate the consultation.      The patient was seen and examined with Dr. Jaleesa Rodríguez, who agrees with the above plan.         JALEESA RODRÍGUEZ, DO       As dictated by KELLIE SY PA-C            D: 2019   T: 2019   MT: [email or web address]      Name:     CLYDE RODRIGUEZ   MRN:      [id number]        Account:       [id number]   :      1932           Consult Date:  2019      Document: [id number]       cc: KRANTHI RECIO

## 2019-03-26 NOTE — PLAN OF CARE
A&O x4. VSS ex bradycardic on 1 LPM NC. Tele SB w/BBB. CMS intact, baseline neuropathy in left foot. Lungs diminished and coarse w/crackles. CT x1 to suction, intermittent air leak, MD aware, no crepitus, dressing CDI. Thoracotomy dressing w/marked drainage. ON-Q in place, reinforced w/ tegaderm, clamps open, sensors taped. BS+, BM-, flatus-. Tolerating regular diet. Denies N/V. Voiding in urinal. Scheduled Tylenol and PO dilaudid for pain. Up w/2, in chair most of evening.

## 2019-03-26 NOTE — BRIEF OP NOTE
[facility]    Brief Operative Note    Pre-operative diagnosis: RIGHT LOWER LOBE LUNG NODULE  Post-operative diagnosis adenocarcinoma right lower lobe lung  Procedure:   Right thoracotomy, wedge resection right lower lobe lung nodule  Surgeon: Surgeon(s) and Role:     * Jose A Vasques MD - Primary     * Olena Hernandez PA-C - Assisting  Anesthesia: General   Estimated blood loss: 25 cc  Drains: One 24 Bjorn chest tube to right pleural space  Specimens:   ID Type Source Tests Collected by Time Destination   A : right lower lobe lung nodule Tissue Lung, Right Lower Lobe SURGICAL PATHOLOGY EXAM Jose A Vasques MD 3/26/2019  9:32 AM      Findings:   Frozen section of RLL lung nodule revealed adenocarcinoma- await final path and staging  Complications: None.  Implants: None.

## 2019-03-27 ENCOUNTER — APPOINTMENT (OUTPATIENT)
Dept: GENERAL RADIOLOGY | Facility: CLINIC | Age: 84
DRG: 164 | End: 2019-03-27
Attending: PHYSICIAN ASSISTANT
Payer: COMMERCIAL

## 2019-03-27 LAB
ANION GAP SERPL CALCULATED.3IONS-SCNC: 4 MMOL/L (ref 3–14)
BUN SERPL-MCNC: 25 MG/DL (ref 7–30)
CALCIUM SERPL-MCNC: 8.4 MG/DL (ref 8.5–10.1)
CHLORIDE SERPL-SCNC: 109 MMOL/L (ref 94–109)
CO2 SERPL-SCNC: 27 MMOL/L (ref 20–32)
COPATH REPORT: NORMAL
CREAT SERPL-MCNC: 1.06 MG/DL (ref 0.66–1.25)
GFR SERPL CREATININE-BSD FRML MDRD: 63 ML/MIN/{1.73_M2}
GLUCOSE BLDC GLUCOMTR-MCNC: 122 MG/DL (ref 70–99)
GLUCOSE SERPL-MCNC: 112 MG/DL (ref 70–99)
HGB BLD-MCNC: 11.2 G/DL (ref 13.3–17.7)
POTASSIUM SERPL-SCNC: 4.9 MMOL/L (ref 3.4–5.3)
SODIUM SERPL-SCNC: 140 MMOL/L (ref 133–144)

## 2019-03-27 PROCEDURE — 36415 COLL VENOUS BLD VENIPUNCTURE: CPT | Performed by: PHYSICIAN ASSISTANT

## 2019-03-27 PROCEDURE — 85018 HEMOGLOBIN: CPT | Performed by: PHYSICIAN ASSISTANT

## 2019-03-27 PROCEDURE — 25000132 ZZH RX MED GY IP 250 OP 250 PS 637: Performed by: PHYSICIAN ASSISTANT

## 2019-03-27 PROCEDURE — 25000125 ZZHC RX 250: Performed by: PHYSICIAN ASSISTANT

## 2019-03-27 PROCEDURE — 80048 BASIC METABOLIC PNL TOTAL CA: CPT | Performed by: PHYSICIAN ASSISTANT

## 2019-03-27 PROCEDURE — 00000146 ZZHCL STATISTIC GLUCOSE BY METER IP

## 2019-03-27 PROCEDURE — 25000132 ZZH RX MED GY IP 250 OP 250 PS 637

## 2019-03-27 PROCEDURE — 12000000 ZZH R&B MED SURG/OB

## 2019-03-27 PROCEDURE — 71045 X-RAY EXAM CHEST 1 VIEW: CPT

## 2019-03-27 PROCEDURE — 25000128 H RX IP 250 OP 636: Performed by: PHYSICIAN ASSISTANT

## 2019-03-27 PROCEDURE — 25800030 ZZH RX IP 258 OP 636: Performed by: PHYSICIAN ASSISTANT

## 2019-03-27 PROCEDURE — 99232 SBSQ HOSP IP/OBS MODERATE 35: CPT | Performed by: INTERNAL MEDICINE

## 2019-03-27 RX ORDER — DIPHENHYDRAMINE HYDROCHLORIDE, ZINC ACETATE 2; .1 G/100G; G/100G
CREAM TOPICAL 3 TIMES DAILY PRN
Status: DISCONTINUED | OUTPATIENT
Start: 2019-03-27 | End: 2019-03-27 | Stop reason: CLARIF

## 2019-03-27 RX ADMIN — GABAPENTIN 100 MG: 100 CAPSULE ORAL at 20:20

## 2019-03-27 RX ADMIN — HYDROMORPHONE HYDROCHLORIDE 2 MG: 2 TABLET ORAL at 06:34

## 2019-03-27 RX ADMIN — GABAPENTIN 100 MG: 100 CAPSULE ORAL at 08:14

## 2019-03-27 RX ADMIN — HYDROMORPHONE HYDROCHLORIDE 2 MG: 2 TABLET ORAL at 13:36

## 2019-03-27 RX ADMIN — ACETAMINOPHEN 975 MG: 325 TABLET, FILM COATED ORAL at 06:34

## 2019-03-27 RX ADMIN — METOPROLOL SUCCINATE 50 MG: 50 TABLET, EXTENDED RELEASE ORAL at 08:14

## 2019-03-27 RX ADMIN — ATORVASTATIN CALCIUM 10 MG: 10 TABLET, FILM COATED ORAL at 21:05

## 2019-03-27 RX ADMIN — SODIUM CHLORIDE: 9 INJECTION, SOLUTION INTRAVENOUS at 02:32

## 2019-03-27 RX ADMIN — HYDROMORPHONE HYDROCHLORIDE 2 MG: 2 TABLET ORAL at 17:19

## 2019-03-27 RX ADMIN — LORATADINE 10 MG: 10 TABLET ORAL at 08:14

## 2019-03-27 RX ADMIN — CALCIUM CARBONATE (ANTACID) CHEW TAB 500 MG 1000 MG: 500 CHEW TAB at 19:02

## 2019-03-27 RX ADMIN — HYDROXYZINE HYDROCHLORIDE 50 MG: 25 TABLET, FILM COATED ORAL at 21:05

## 2019-03-27 RX ADMIN — SENNOSIDES AND DOCUSATE SODIUM 1 TABLET: 8.6; 5 TABLET ORAL at 08:14

## 2019-03-27 RX ADMIN — ENOXAPARIN SODIUM 40 MG: 40 INJECTION SUBCUTANEOUS at 08:14

## 2019-03-27 RX ADMIN — HYDROMORPHONE HYDROCHLORIDE 2 MG: 2 TABLET ORAL at 20:20

## 2019-03-27 RX ADMIN — ACETAMINOPHEN 975 MG: 325 TABLET, FILM COATED ORAL at 13:36

## 2019-03-27 RX ADMIN — Medication: at 18:43

## 2019-03-27 RX ADMIN — HYDROMORPHONE HYDROCHLORIDE 2 MG: 2 TABLET ORAL at 02:23

## 2019-03-27 RX ADMIN — BACITRACIN: 500 OINTMENT TOPICAL at 17:15

## 2019-03-27 RX ADMIN — ACETAMINOPHEN 975 MG: 325 TABLET, FILM COATED ORAL at 21:05

## 2019-03-27 RX ADMIN — BACITRACIN: 500 OINTMENT TOPICAL at 08:22

## 2019-03-27 RX ADMIN — RANITIDINE 150 MG: 150 TABLET ORAL at 19:02

## 2019-03-27 RX ADMIN — CAMPHOR AND MENTHOL: .6; .6 LOTION TOPICAL at 08:20

## 2019-03-27 RX ADMIN — SENNOSIDES AND DOCUSATE SODIUM 1 TABLET: 8.6; 5 TABLET ORAL at 20:21

## 2019-03-27 RX ADMIN — BACITRACIN: 500 OINTMENT TOPICAL at 21:34

## 2019-03-27 RX ADMIN — FERROUS SULFATE TAB 325 MG (65 MG ELEMENTAL FE) 325 MG: 325 (65 FE) TAB at 21:05

## 2019-03-27 RX ADMIN — POLYETHYLENE GLYCOL 3350 17 G: 17 POWDER, FOR SOLUTION ORAL at 17:25

## 2019-03-27 RX ADMIN — UMECLIDINIUM 1 PUFF: 62.5 AEROSOL, POWDER ORAL at 08:18

## 2019-03-27 RX ADMIN — RANITIDINE 150 MG: 150 TABLET ORAL at 08:14

## 2019-03-27 ASSESSMENT — ACTIVITIES OF DAILY LIVING (ADL)
ADLS_ACUITY_SCORE: 14
ADLS_ACUITY_SCORE: 12
ADLS_ACUITY_SCORE: 14
ADLS_ACUITY_SCORE: 12

## 2019-03-27 NOTE — PROGRESS NOTES
THORACIC SURGERY POD # 1    Doing well  AVSS on RA  Discussed findings and procedure  No air leak  No bleeding    CXR ok    Satisfactory    CT to water seal  Ambulate  resp care ++    BLANQUITA EMMANUEL MD [facility] ONCOLOGY THORACIC SURGERY  CELL:  [phone]  OFFICE: [phone]

## 2019-03-27 NOTE — PLAN OF CARE
VSS ex. bradycardic. A/Ox4. CT Incision dressing changed x2 during shift d/t leakage, output 50mL. CT with minimal air leak, MD aware. Ct tube water seal per MD order. OnQ pump sensors taped, clamps open, incision dressing with minimal drainage. Pain controlled with dilaudid. Up with assist x1, ambulated hallways. Regular diet. BS +, negative flatus. Voiding adequate. LS diminished. IS and Acapella encouraged.

## 2019-03-27 NOTE — PROGRESS NOTES
[facility]    Medicine Progress Note - Hospitalist Service       Date of Admission:  3/26/2019  Assessment & Plan   Hermilo Velasquez is an 86-year-old gentleman with a history of neuropathy, non-Hodgkin's lymphoma, dyslipidemia, a history of paroxysmal atrial fibrillation, history of deep vein thrombosis and PE, a history of severe aortic stenosis status post aortic valve replacement, chronic obstructive pulmonary disease, a history of gastrointestinal bleed, chronic kidney disease, and congestive heart failure, who is postoperative day 0 status post right thoracotomy with wedge resection for a right lower lobe lung nodule.  The Hospitalist Service was consulted for postoperative medical co-management.     Status post limited right thoracotomy with wedge resection of a right lower lobe lung nodule. POD #1  Procedure was performed under general anesthesia with an EBL of 25 mL.  Frozen section showing adenocarcinoma.  There were no intraoperative complications.  The patient is hemodynamically stable and pain is well controlled.   - Primary management per Thoracic Surgery including DVT prophylaxis and pain control    Contact dermatitis  Patient complaining of itching to left shoulder blade.  On exam there is a linear area of erythema with questionable vesicle that appears consistent with contact dermatitis.  This is likely due to adhesives used for sterile dressing from yesterday.   - Topical benadryl    History of paroxysmal atrial fibrillation  Noted postoperatively after the valve replacement surgery.  He was on metoprolol 50 mg daily prior to admission.   - Continue metoprolol  - Resume warfarin when okay with Surgery.     History of deep vein thrombosis and PE  The patient has been holding warfarin prior to surgery as instructed.  Recommend that this be resumed when okay with Surgery.     COPD, non-oxygen dependent  The patient denies recent shortness of breath or wheezing. - Continue PTA Spiriva  with albuterol PRN    History of gastrointestinal bleed  GERD  Patient denies recent melena.  He was on Zantac tqojf-cg-ufgpjgggv due to PPI allergy.   - Avoid NSAIDS  - PTA Zantac     History of non-Hodgkin's lymphoma    - Follow up with Oncology as directed    Neuropathy, Unspecified  - Continue qevvj-zo-julsoluim gabapentin    HLP   - Continue aivlb-zh-haezrnxbz atorvastatin    History of severe aortic stenosis, status post aortic valve replacement with [employer]sen bovine bioprosthetic valve    - Follow up with Cardiology in the outpatient setting      Diet: Advance Diet as Tolerated: Regular Diet Adult    DVT Prophylaxis: Defer to primary service  Suazo Catheter: not present      Disposition Plan   Expected discharge: Defer to primary service   Entered: Jose Miguel Rodríguez DO 03/27/2019, 9:28 AM       The patient's care was discussed with the Patient.    Jose Miguel Rodríguez DO  Hospitalist Service  [facility]    ______________________________________________________________________    Interval History   Patient seen and examined.  Having some post op pain but notes it is fairly well controlled.  Currently the patient is most concerned with itching to his left scapula.  No chest pain or SOB.     Data reviewed today: I reviewed all medications, new labs and imaging results over the last 24 hours. I personally reviewed no images or EKG's today.    Physical Exam   Vital Signs: Temp: 98.6  F (37  C) Temp src: Oral BP: 100/49 Pulse: 52 Heart Rate: 58 Resp: 16 SpO2: 90 % O2 Device: None (Room air) Oxygen Delivery: 1 LPM  Weight: 173 lbs 0 oz  General Appearance: Resting comfortably in chair.  NAD   Respiratory: Clear to auscultation.  No respiratory distress on RA   Cardiovascular: Bradycardiac.  No murmurs  GI: Bowel sounds present.  No tenderness  Skin: Linear line of erythema with questionable vesicles over left scapula near the incision site consistent with contact dermatitis   Other: No edema.  Chest  tube in place     Data   Recent Labs   Lab 03/26/19  0757 03/25/19   WBC 8.3  --    HGB 12.7*  --    MCV 89  --      --    INR 1.02 1.1     --    POTASSIUM 4.2  --    CHLORIDE 111*  --    CO2 26  --    BUN 23  --    CR 1.08  --    ANIONGAP 6  --    AMRITA 9.1  --    GLC 97  --      Recent Results (from the past 24 hour(s))   XR Chest Port 1 View    Narrative    CHEST PORTABLE ONE VIEW March 27, 2019 4:48 AM     HISTORY: Status post right thoracotomy.     COMPARISON: 3/26/2019.        Impression    IMPRESSION: Right-sided chest tube remains in place. Trace right-sided  pneumothorax is present. Right lung appears relatively clear.  Persistent small left pleural effusion and left basilar opacity.  Subcutaneous emphysema seen over the right chest wall.     LAURY NGUYEN MD

## 2019-03-27 NOTE — PLAN OF CARE
Pt is alert and oriented x 4, AVSS on room air. Incision covered dressing CDI. Chest tube has air leak. Pain control with tylenol and dilaudid.

## 2019-03-28 ENCOUNTER — APPOINTMENT (OUTPATIENT)
Dept: GENERAL RADIOLOGY | Facility: CLINIC | Age: 84
DRG: 164 | End: 2019-03-28
Attending: PHYSICIAN ASSISTANT
Payer: COMMERCIAL

## 2019-03-28 LAB — GLUCOSE BLDC GLUCOMTR-MCNC: 83 MG/DL (ref 70–99)

## 2019-03-28 PROCEDURE — 00000146 ZZHCL STATISTIC GLUCOSE BY METER IP

## 2019-03-28 PROCEDURE — 99231 SBSQ HOSP IP/OBS SF/LOW 25: CPT | Performed by: INTERNAL MEDICINE

## 2019-03-28 PROCEDURE — 25000132 ZZH RX MED GY IP 250 OP 250 PS 637: Performed by: PHYSICIAN ASSISTANT

## 2019-03-28 PROCEDURE — 71045 X-RAY EXAM CHEST 1 VIEW: CPT

## 2019-03-28 PROCEDURE — 12000000 ZZH R&B MED SURG/OB

## 2019-03-28 PROCEDURE — 25000128 H RX IP 250 OP 636: Performed by: PHYSICIAN ASSISTANT

## 2019-03-28 RX ADMIN — BACITRACIN: 500 OINTMENT TOPICAL at 05:38

## 2019-03-28 RX ADMIN — SENNOSIDES AND DOCUSATE SODIUM 1 TABLET: 8.6; 5 TABLET ORAL at 08:24

## 2019-03-28 RX ADMIN — BACITRACIN: 500 OINTMENT TOPICAL at 20:12

## 2019-03-28 RX ADMIN — METOPROLOL SUCCINATE 50 MG: 50 TABLET, EXTENDED RELEASE ORAL at 08:25

## 2019-03-28 RX ADMIN — POLYETHYLENE GLYCOL 3350 17 G: 17 POWDER, FOR SOLUTION ORAL at 15:30

## 2019-03-28 RX ADMIN — CAMPHOR AND MENTHOL: .6; .6 LOTION TOPICAL at 08:33

## 2019-03-28 RX ADMIN — ATORVASTATIN CALCIUM 10 MG: 10 TABLET, FILM COATED ORAL at 22:13

## 2019-03-28 RX ADMIN — RANITIDINE 150 MG: 150 TABLET ORAL at 08:24

## 2019-03-28 RX ADMIN — ACETAMINOPHEN 975 MG: 325 TABLET, FILM COATED ORAL at 05:35

## 2019-03-28 RX ADMIN — GABAPENTIN 100 MG: 100 CAPSULE ORAL at 20:05

## 2019-03-28 RX ADMIN — ACETAMINOPHEN 975 MG: 325 TABLET, FILM COATED ORAL at 14:01

## 2019-03-28 RX ADMIN — ENOXAPARIN SODIUM 40 MG: 40 INJECTION SUBCUTANEOUS at 08:29

## 2019-03-28 RX ADMIN — LORATADINE 10 MG: 10 TABLET ORAL at 08:25

## 2019-03-28 RX ADMIN — UMECLIDINIUM 1 PUFF: 62.5 AEROSOL, POWDER ORAL at 08:33

## 2019-03-28 RX ADMIN — RANITIDINE 150 MG: 150 TABLET ORAL at 20:05

## 2019-03-28 RX ADMIN — GABAPENTIN 100 MG: 100 CAPSULE ORAL at 08:25

## 2019-03-28 RX ADMIN — BACITRACIN: 500 OINTMENT TOPICAL at 10:32

## 2019-03-28 RX ADMIN — HYDROXYZINE HYDROCHLORIDE 50 MG: 25 TABLET, FILM COATED ORAL at 22:14

## 2019-03-28 RX ADMIN — ACETAMINOPHEN 975 MG: 325 TABLET, FILM COATED ORAL at 22:13

## 2019-03-28 RX ADMIN — FERROUS SULFATE TAB 325 MG (65 MG ELEMENTAL FE) 325 MG: 325 (65 FE) TAB at 22:13

## 2019-03-28 ASSESSMENT — ACTIVITIES OF DAILY LIVING (ADL)
ADLS_ACUITY_SCORE: 14

## 2019-03-28 ASSESSMENT — MIFFLIN-ST. JEOR: SCORE: 1489.25

## 2019-03-28 NOTE — PLAN OF CARE
Pt vitally stable on room air. Pain 3-7/10 controlled with oral dilaudid. Alert and orientedx 4. Chest tube -20H20 with air leak (MD is aware). Crepitus present. Lung sounds diminished. Dressing saturated with serosang drainage (changed x1). Patient ambulated in hallway x1 on eves and used breathing devices. Pt voiding and tolerating diet. Miralax given to patient. Tpump in place secured with clamps open. Thoracotomy site is C/D/I and open to air. Benadryl used on back for itching.

## 2019-03-28 NOTE — PROGRESS NOTES
THORACIC SURGERY POD # 2    Doing well  avss on RA  Wound OK  No air leak  No bleeding    CXR looks good    Discussed path report  Plan to clamp CT megan EMMANUEL MD [facility] ONCOLOGY THORACIC SURGERY  CELL:  [phone]  OFFICE: [phone]

## 2019-03-28 NOTE — PLAN OF CARE
A&OX4, VSS, denies pain.   Thoracic incision CD&I with steristrips.  OnQ intact, sensors taped, clamps open.  Denies pain.  Up ambulating often this shift.  Up in chair all shift.  CT to water seal with intermittent small AL with cough only. Lungs CTA, denies SOB.  Encouraged frequent use of IS and flutter valve, patient compliant.  Adequate I&Os.

## 2019-03-28 NOTE — PLAN OF CARE
VSS. A/Ox4. CT on water seal with 90mL output. - air leak, - crepitus. CT dressing changed, CDI. Right thoracotomy incision with steri strips, CDI. OnQ pump infusing, clamps opened and sensors taped. Incision. Pain controlled with scheduled tylenol. Up with SBA, ambulated hallways x3. Regular diet. BS active in all quadrants, Flatus +. Voiding adequate. LS diminished. Increased productive cough, encourage. IS to 1500 on 10 reps. PIV removed d/t infiltration.

## 2019-03-28 NOTE — PROGRESS NOTES
[facility]    Medicine Progress Note - Hospitalist Service       Date of Admission:  3/26/2019  Assessment & Plan      Hermilo Velasquez is an 86-year-old gentleman with a history of neuropathy, non-Hodgkin's lymphoma, dyslipidemia, a history of paroxysmal atrial fibrillation, history of deep vein thrombosis and PE, a history of severe aortic stenosis status post aortic valve replacement, chronic obstructive pulmonary disease, a history of gastrointestinal bleed, chronic kidney disease, and congestive heart failure, who is postoperative day 0 status post right thoracotomy with wedge resection for a right lower lobe lung nodule.  The Hospitalist Service was consulted for postoperative medical co-management.     Status post limited right thoracotomy with wedge resection of a right lower lobe lung nodule. POD #2  Procedure was performed under general anesthesia with an EBL of 25 mL.  Frozen section showing adenocarcinoma.  There were no intraoperative complications.  The patient is hemodynamically stable and pain is well controlled.   - Primary management per Thoracic Surgery including DVT prophylaxis and pain control    Contact dermatitis.  Resolved  Patient complaining of itching to left shoulder blade.  On exam there is a linear area of erythema with questionable vesicle that appears consistent with contact dermatitis.  This is likely due to adhesives used for sterile dressing from yesterday.   - Topical benadryl PRN     History of paroxysmal atrial fibrillation  Noted postoperatively after the valve replacement surgery.  He was on metoprolol 50 mg daily prior to admission.   - Continue metoprolol  - Resume warfarin when okay with Surgery.     History of deep vein thrombosis and PE  The patient has been holding warfarin prior to surgery as instructed.  Recommend that this be resumed when okay with Surgery.     COPD, non-oxygen dependent  The patient denies recent shortness of breath or wheezing. -  Continue PTA Spiriva with albuterol PRN    History of gastrointestinal bleed  GERD  Patient denies recent melena.  He was on Zantac vyrnc-xd-ozozccfdw due to PPI allergy.   - Avoid NSAIDS  - PTA Zantac     History of non-Hodgkin's lymphoma    - Follow up with Oncology as directed    Neuropathy, Unspecified  - Continue nousp-rz-vqgyojsxu gabapentin    HLP   - Continue vajrs-nb-wrdwyvwmb atorvastatin    History of severe aortic stenosis, status post aortic valve replacement with [employer] bovine bioprosthetic valve    - Follow up with Cardiology in the outpatient setting        Diet: Advance Diet as Tolerated: Regular Diet Adult    DVT Prophylaxis: Defer to primary service  Suazo Catheter: not present    Disposition Plan   Expected discharge: Defer to primary service.  Likely once chest tube removed.  From medical stand point patient is okay for discharge.   Entered: Jose Miguel Rodríguez DO 03/28/2019, 10:14 AM       The patient's care was discussed with the Patient.    Jose Miguel Rodríguez DO  Hospitalist Service  [facility]    ______________________________________________________________________    Interval History   Patient seen and examined. States his itching has improved. No pain currently.  No difficulty breathing.  No fevers or chills.    Data reviewed today: I reviewed all medications, new labs and imaging results over the last 24 hours. I personally reviewed no images or EKG's today.    Physical Exam   Vital Signs: Temp: 97.3  F (36.3  C) Temp src: Oral BP: 105/59 Pulse: 61 Heart Rate: 72 Resp: 16 SpO2: 93 % O2 Device: None (Room air)    Weight: 177 lbs .47 oz  General Appearance: Resting comfortably.  NAD   Respiratory: Clear to auscultation.  No respiratory distress  Cardiovascular: RRR.  No murmurs  GI: Bowel sounds present  Skin: Contact dermatitis appears to have resolved  Other: Chest tube in place     Data   Recent Labs   Lab 03/27/19  1319 03/26/19  0757 03/25/19   WBC  --  8.3  --     HGB 11.2* 12.7*  --    MCV  --  89  --    PLT  --  151  --    INR  --  1.02 1.1    143  --    POTASSIUM 4.9 4.2  --    CHLORIDE 109 111*  --    CO2 27 26  --    BUN 25 23  --    CR 1.06 1.08  --    ANIONGAP 4 6  --    AMRITA 8.4* 9.1  --    * 97  --      Recent Results (from the past 24 hour(s))   XR Chest Port 1 View    Narrative    CHEST ONE VIEW PORTABLE  3/28/2019 5:35 AM     HISTORY: Status post right thoracotomy.    COMPARISON: 3/27/2019      Impression    IMPRESSION: No significant change. Bilateral basilar opacities likely  represent atelectasis, left worse than right. Right chest tube is  unchanged. Tiny right apical pneumothorax is unchanged. Heart size is  unchanged.    RAI VILLEGAS MD

## 2019-03-29 ENCOUNTER — APPOINTMENT (OUTPATIENT)
Dept: GENERAL RADIOLOGY | Facility: CLINIC | Age: 84
DRG: 164 | End: 2019-03-29
Attending: PHYSICIAN ASSISTANT
Payer: COMMERCIAL

## 2019-03-29 VITALS
TEMPERATURE: 98.5 F | HEIGHT: 70 IN | DIASTOLIC BLOOD PRESSURE: 66 MMHG | RESPIRATION RATE: 16 BRPM | BODY MASS INDEX: 25.38 KG/M2 | SYSTOLIC BLOOD PRESSURE: 133 MMHG | HEART RATE: 69 BPM | WEIGHT: 177.25 LBS | OXYGEN SATURATION: 98 %

## 2019-03-29 LAB
CREAT SERPL-MCNC: 1.05 MG/DL (ref 0.66–1.25)
GFR SERPL CREATININE-BSD FRML MDRD: 64 ML/MIN/{1.73_M2}
PLATELET # BLD AUTO: 178 10E9/L (ref 150–450)

## 2019-03-29 PROCEDURE — 25000128 H RX IP 250 OP 636: Performed by: PHYSICIAN ASSISTANT

## 2019-03-29 PROCEDURE — 71045 X-RAY EXAM CHEST 1 VIEW: CPT

## 2019-03-29 PROCEDURE — 36415 COLL VENOUS BLD VENIPUNCTURE: CPT | Performed by: PHYSICIAN ASSISTANT

## 2019-03-29 PROCEDURE — 85049 AUTOMATED PLATELET COUNT: CPT | Performed by: PHYSICIAN ASSISTANT

## 2019-03-29 PROCEDURE — 25000125 ZZHC RX 250: Performed by: PHYSICIAN ASSISTANT

## 2019-03-29 PROCEDURE — 25000132 ZZH RX MED GY IP 250 OP 250 PS 637: Performed by: PHYSICIAN ASSISTANT

## 2019-03-29 PROCEDURE — 99231 SBSQ HOSP IP/OBS SF/LOW 25: CPT | Performed by: INTERNAL MEDICINE

## 2019-03-29 PROCEDURE — 82565 ASSAY OF CREATININE: CPT | Performed by: PHYSICIAN ASSISTANT

## 2019-03-29 RX ORDER — HYDROCODONE BITARTRATE AND ACETAMINOPHEN 5; 325 MG/1; MG/1
1 TABLET ORAL EVERY 6 HOURS PRN
Qty: 28 TABLET | Refills: 0 | Status: SHIPPED | OUTPATIENT
Start: 2019-03-29 | End: 2019-10-01

## 2019-03-29 RX ORDER — ACETAMINOPHEN 325 MG/1
650 TABLET ORAL EVERY 6 HOURS PRN
Qty: 100 TABLET | COMMUNITY
Start: 2019-03-29 | End: 2019-05-13

## 2019-03-29 RX ADMIN — GABAPENTIN 100 MG: 100 CAPSULE ORAL at 09:00

## 2019-03-29 RX ADMIN — BACITRACIN: 500 OINTMENT TOPICAL at 03:11

## 2019-03-29 RX ADMIN — BACITRACIN: 500 OINTMENT TOPICAL at 09:01

## 2019-03-29 RX ADMIN — METOPROLOL SUCCINATE 50 MG: 50 TABLET, EXTENDED RELEASE ORAL at 09:00

## 2019-03-29 RX ADMIN — Medication: at 03:10

## 2019-03-29 RX ADMIN — BACITRACIN: 500 OINTMENT TOPICAL at 05:46

## 2019-03-29 RX ADMIN — SENNOSIDES AND DOCUSATE SODIUM 2 TABLET: 8.6; 5 TABLET ORAL at 09:00

## 2019-03-29 RX ADMIN — Medication 10 MG: at 10:36

## 2019-03-29 RX ADMIN — HYDROMORPHONE HYDROCHLORIDE 2 MG: 2 TABLET ORAL at 09:04

## 2019-03-29 RX ADMIN — ENOXAPARIN SODIUM 40 MG: 40 INJECTION SUBCUTANEOUS at 09:00

## 2019-03-29 RX ADMIN — LORATADINE 10 MG: 10 TABLET ORAL at 09:00

## 2019-03-29 RX ADMIN — RANITIDINE 150 MG: 150 TABLET ORAL at 09:00

## 2019-03-29 RX ADMIN — UMECLIDINIUM 1 PUFF: 62.5 AEROSOL, POWDER ORAL at 09:01

## 2019-03-29 RX ADMIN — CAMPHOR AND MENTHOL: .6; .6 LOTION TOPICAL at 09:01

## 2019-03-29 RX ADMIN — ACETAMINOPHEN 975 MG: 325 TABLET, FILM COATED ORAL at 05:45

## 2019-03-29 ASSESSMENT — MIFFLIN-ST. JEOR: SCORE: 1490.25

## 2019-03-29 ASSESSMENT — ACTIVITIES OF DAILY LIVING (ADL)
ADLS_ACUITY_SCORE: 14

## 2019-03-29 NOTE — PLAN OF CARE
A&Ox4. VSS on RA. Up SBA. Tolerating regular diet. Chest tube clamped at midnight, unclamp post AM xray. No crepitus, intermittent air leak present, md aware. CT drsg changed x3, serous/serosang drainage. Thoracotomy site with steri strips and dried drainage. On Q pump infusing, sensors taped clamps open. Pain controlled with scheduled tylenol. Benadryl applied for back itching. Encouraged to do AROM for shoulder soreness. Voiding adequately/frequently. +flatus. No IV access, md aware.

## 2019-03-29 NOTE — PLAN OF CARE
A/O x4. VSS. Pain managed with PRN PO dilaudid. Discussed discharge instructions and medications with pt, verbalized understanding. Pt discharged to home with belongings and pain medication script per patient request. Transportation provided by spouse.

## 2019-03-29 NOTE — PROGRESS NOTES
[facility]    Medicine Progress Note - Hospitalist Service       Date of Admission:  3/26/2019  Assessment & Plan   Hermilo Velasquez is an 86-year-old gentleman with a history of neuropathy, non-Hodgkin's lymphoma, dyslipidemia, a history of paroxysmal atrial fibrillation, history of deep vein thrombosis and PE, a history of severe aortic stenosis status post aortic valve replacement, chronic obstructive pulmonary disease, a history of gastrointestinal bleed, chronic kidney disease, and congestive heart failure, who is postoperative day 0 status post right thoracotomy with wedge resection for a right lower lobe lung nodule.  The Hospitalist Service was consulted for postoperative medical co-management.     Status post limited right thoracotomy with wedge resection of a right lower lobe lung nodule. POD #3  Procedure was performed under general anesthesia with an EBL of 25 mL.  Frozen section showing adenocarcinoma.  There were no intraoperative complications.  The patient is hemodynamically stable and pain is well controlled.   - Primary management per Thoracic Surgery including DVT prophylaxis and pain control    Contact dermatitis.  Resolved  Patient complaining of itching to left shoulder blade.  On exam there is a linear area of erythema with questionable vesicle that appears consistent with contact dermatitis.  This is likely due to adhesives used for sterile dressing from yesterday.   - Topical benadryl PRN     History of paroxysmal atrial fibrillation  Noted postoperatively after the valve replacement surgery.  He was on metoprolol 50 mg daily prior to admission.   - Continue metoprolol  - Resume warfarin when okay with Surgery.     History of deep vein thrombosis and PE  The patient has been holding warfarin prior to surgery as instructed.  Recommend that this be resumed when okay with Surgery.     COPD, non-oxygen dependent  The patient denies recent shortness of breath or wheezing. -  Continue PTA Spiriva with albuterol PRN    History of gastrointestinal bleed  GERD  Patient denies recent melena.  He was on Zantac izxfq-or-zwyrozbwr due to PPI allergy.   - Avoid NSAIDS  - PTA Zantac     History of non-Hodgkin's lymphoma    - Follow up with Oncology as directed    Neuropathy, Unspecified  - Continue fjyxf-yi-aeqmcwmov gabapentin    HLP   - Continue ourkt-ch-snyycbduy atorvastatin    History of severe aortic stenosis, status post aortic valve replacement with [employer] bovine bioprosthetic valve    - Follow up with Cardiology in the outpatient setting        Diet: Advance Diet as Tolerated: Regular Diet Adult  Diet    DVT Prophylaxis: Defer to primary service  Suazo Catheter: not present    Disposition Plan   Expected discharge: Defer to primary   Entered: Jose Miguel Rodríguez DO 03/29/2019, 12:15 PM       The patient's care was discussed with the Patient.    Jose Miguel Rodríguez DO  Hospitalist Service  [facility]    ______________________________________________________________________    Interval History   Patient seen and examined.  Chest tube removed this AM without difficulty.  No pain currently  No fevers.     Data reviewed today: I reviewed all medications, new labs and imaging results over the last 24 hours. I personally reviewed no images or EKG's today.    Physical Exam   Vital Signs: Temp: 98.5  F (36.9  C) Temp src: Oral BP: 133/66 Pulse: 69 Heart Rate: 65 Resp: 16 SpO2: 98 % O2 Device: None (Room air)    Weight: 177 lbs 4 oz  General Appearance: Resting comfortably.  NAD   Respiratory: Clear to auscultation.  No respiratory distress  Cardiovascular: RRR.  No murmurs  GI: Bowel sounds present.  No tenderness  Skin: No rashes  Other: No cyanosis      Data   Recent Labs   Lab 03/29/19  0720 03/27/19  1319 03/26/19  0757 03/25/19   WBC  --   --  8.3  --    HGB  --  11.2* 12.7*  --    MCV  --   --  89  --      --  151  --    INR  --   --  1.02 1.1   NA  --  140 143  --     POTASSIUM  --  4.9 4.2  --    CHLORIDE  --  109 111*  --    CO2  --  27 26  --    BUN  --  25 23  --    CR 1.05 1.06 1.08  --    ANIONGAP  --  4 6  --    AMRITA  --  8.4* 9.1  --    GLC  --  112* 97  --      Recent Results (from the past 24 hour(s))   XR Chest Port 1 View    Narrative    CHEST ONE VIEW PORTABLE  3/29/2019 5:35 AM     HISTORY: Status post right thoracotomy.    COMPARISON: 3/25/2019      Impression    IMPRESSION: Left lower lobe aeration has improved. No other  significant change. Right chest tube is unchanged. No significant  pneumothorax. Heart size is unchanged.    RAI VILLEGAS MD

## 2019-03-29 NOTE — DISCHARGE INSTRUCTIONS
"[facility]  Discharge Orders & Follow-up Care  Video-Assisted Thoracoscopy or Thoracotomy      Call Ofe at Dr. Vasques  office at [phone] to make a return appointment with a chest x-ray on_Monday, April 8_.  Your appointment will be with either Olena Hernandez PA-C, who is Dr. Vasques Physician Assistant.        A. Patient Care:  Call Dr Vasques  office @ [phone] if you experience:  *Severe chills or a fever or 101 F or higher on two occasions  *Increased incisional pain that cannot be relieved with rest or pain medications  *Presence of unusual incisional or chest tube site drainage that is odorous, green or yellow in color, or if your incision is warm, red or swollen  *Coughing up bright red blood or greenish-yellow secretions  *Chest pain that gets worse with deep breathing or a significant increase in shortness of breath  *Inability to urinate or have a bowel movement  *New pain or swelling in your legs    In an emergency, call 911 or have someone drive you to the nearest Emergency Department    Pain Relief:  You may have been given a prescription for narcotic pain medicine called Norco (hydrocodone/acetaminophen).  You may also take acetaminophen over the counter.  Recommended dosage is 650 mg of Acetaminophen every 6 hours as needed.  Many patients get good pain relief by \"staggering\" these medications. Do not take more than 4000 mg of acetaminophen in a 24 hour period.    Constipation:  Narcotic pain medication, general anesthesia, and time in the hospital with less activity than normal can all cause constipation. Please take a stool softener (what you have at home or one that was prescribed during hospital discharge, such as Senokot-S, docusate sodium, Miralax, Milk of Magnesia) while you are taking narcotics to prevent constipation. Stop taking the stool softener once you are done taking narcotics or if you begin having loose stools/diarrhea. Please call our clinic nurse, Yamilex, " at [phone] if you are not having success (not having BMs) with your current stool softener.     No driving while on narcotics.     Activity:  _XXX__ No heavy lifting greater than 8-10 pounds on the operative side for 4-6 weeks for a thoracotomy    Wound Care:  *You should look at your incision each day and keep it clean while it heals  *Do not apply any creams, salves such as Bacitracin, or ointments on the incision while it is healing  * Steri strips (thin white paper strips) will be present on the incision(s) and they will peel off as your incision heals-- otherwise, they will be removed at your post-op appointment.    *Remove the dressings covering your chest tube site 48 hours after your discharge from the hospital-- on Sunday, March 31. You may then shower.  Wash the incision and chest tube site(s) daily with soap and water. No bathing or immersing incision underwater for approximately 2 weeks or until the chest tube sites are completely healed.     Place a dry gauze dressing (and tape) over the chest tube site because it is normal to have some drainage for a few days after the chest tube is removed.  Do not be alarmed if a large amount of fluid drains (should be pink or yellow) either spontaneously or with coughing or exertion. If this happens, just place a larger dry gauze dressing over the chest tube site-- it will stop and scab over in about a week or so. Once the drainage stops, you can stop covering the chest tube site with a dressing. Call our office if the drainage is milky or green in color or foul-smelling.    B. Respiratory:  _XXX__ Utilize Incentive spirometer and flutter valve/acapella (if you received one) 10 times in a row every few hours while awake for a few weeks after discharging home from the hospital    C. Activity:  It may take a few months to regain your normal energy level/stamina. It is important during your recovery to get regular physical activity:  *walk each day at a  comfortable pace  *climb stairs as tolerated  *take some rest periods each day but try not to take too many long naps, as this can affect your sleep at night      Revised November 2018

## 2019-03-29 NOTE — PROGRESS NOTES
"Thoracic Surgery POD #3:  /66 (BP Location: Right arm)   Pulse 69   Temp 98.5  F (36.9  C) (Oral)   Resp 16   Ht 1.778 m (5' 10\")   Wt 80.4 kg (177 lb 4 oz)   SpO2 98%   BMI 25.43 kg/m    CXR: no PTX, improved atelectasis, OK  CT: minimal serous output, no air leak  S: No complaints- no SOB- pain controlled- passing gas- eating well. Dicussed pain control, wound care, follow up and reviewed final path once again.  O: Inc.: steris intact, dry, no erythema  CT: DCed without complication.  Occlusive dressing applied.  On-Q: DCed without complicaiton  P: OK to discharge home today- needs to see me with CXR on April 8  Please fill Norco Rx here  OK to resume coumadin now that Chest tube is out    Olena Hernandez PA-C with Dr. [facility]  Cell [phone]      "

## 2019-04-03 ENCOUNTER — ANTICOAGULATION THERAPY VISIT (OUTPATIENT)
Dept: NURSING | Facility: CLINIC | Age: 84
End: 2019-04-03
Payer: COMMERCIAL

## 2019-04-03 DIAGNOSIS — I48.0 PAROXYSMAL ATRIAL FIBRILLATION (H): ICD-10-CM

## 2019-04-03 DIAGNOSIS — I26.99 PULMONARY EMBOLISM, BILATERAL (H): ICD-10-CM

## 2019-04-03 LAB — INR POINT OF CARE: 1.4 (ref 0.86–1.14)

## 2019-04-03 PROCEDURE — 99207 ZZC NO CHARGE NURSE ONLY: CPT

## 2019-04-03 PROCEDURE — 85610 PROTHROMBIN TIME: CPT | Mod: QW

## 2019-04-03 PROCEDURE — 36416 COLLJ CAPILLARY BLOOD SPEC: CPT

## 2019-04-03 NOTE — PROGRESS NOTES
ANTICOAGULATION FOLLOW-UP CLINIC VISIT    Patient Name:  Hermilo Velasquez  Date:  4/3/2019  Contact Type:  Face to Face    SUBJECTIVE:     Patient Findings     Positives:   Hospital admission (Hospitalized for thoracotomy 3/26-3/29. hospital notes indicate pt was given Lovenox but no warfarin dosing indicated)           OBJECTIVE    INR Protime   Date Value Ref Range Status   2019 1.4 (A) 0.86 - 1.14 Final       ASSESSMENT / PLAN  INR assessment SUB    Recheck INR In: 1 WEEK    INR Location Clinic      Anticoagulation Summary  As of 4/3/2019    INR goal:   2.0-3.0   TTR:   49.1 % (3 y)   INR used for dosin.4! (4/3/2019)   Warfarin maintenance plan:   5 mg (5 mg x 1) every day   Full warfarin instructions:   4/3: 7.5 mg; : 7.5 mg; Otherwise 5 mg every day   Weekly warfarin total:   35 mg   Weekly max warfarin dose:   45 mg   Plan last modified:   Dione iGron RN (2019)   Next INR check:   4/10/2019   Target end date:   Indefinite    Indications    Long-term (current) use of anticoagulants [Z79.01] [Z79.01]  Pulmonary embolism  bilateral (H) [I26.99]  Paroxysmal atrial fibrillation (H) [I48.0]             Anticoagulation Episode Summary     INR check location:   [facility]    Preferred lab:       Send INR reminders to:   [facility] ANTICOAGULATION    Comments:         Anticoagulation Care Providers     Provider Role Specialty Phone number    Karson Bishop MD [facility] Internal Medicine [phone]            See the Encounter Report to view Anticoagulation Flowsheet and Dosing Calendar (Go to Encounters tab in chart review, and find the Anticoagulation Therapy Visit)    Recently hospitalized for thoracotomy procedure. ER notes indicate pt was given Lovenox during his hospital stay but hospital [facility] notes do have any warfarin dosing listed. Pt reports he resumed 5mg daily when he got home and was not instructed to do Lovenox at home, only during hospital stay. Dosage adjustment made based on  physician directed care plan. Recheck INR 1 week, sooner if concerns.     Pt aware if signs of clotting (pain, tenderness, swelling, color change in leg or arm, SOB) and bleeding occur (blood in stool, urine, large bruising, bleeding gums, nosebleeds) to have INR check sooner. If sx severe report to ER or concerned for stroke call 911. If general questions or concerns arise, call clinic.    Yamilex Gutierrez RN

## 2019-04-04 ENCOUNTER — TELEPHONE (OUTPATIENT)
Dept: FAMILY MEDICINE | Facility: CLINIC | Age: 84
End: 2019-04-04

## 2019-04-04 NOTE — TELEPHONE ENCOUNTER
Chief Complaint: Nodule Of Lower Lobe Of Right Lung, Right Lower Lobe Lung Nodule,FRI 29-MAR-2019,ed/ip  0 / 1    [phone] (home)

## 2019-04-05 NOTE — TELEPHONE ENCOUNTER
"ED/Discharge Protocol    \"Hi, my name is Fernanda Kenny, a registered nurse, and I am calling on behalf of Dr. Bishop's office at [facility].  I am calling to follow up and see how things are going for you after your recent visit.\"    \"I see that you were in the (ER/UC/IP) on 3/26/2019-3/29/2019.    How are you doing now that you are home?\" patient states he's doing ok - having some pain - has pain pills though - \"not taking them too often.\"    Is patient experiencing symptoms that may require a hospital visit?  No    Discharge Instructions    \"Let's review your discharge instructions.  What is/are the follow-up recommendations?  Pt. Response: F/U with surgeon    \"Were you instructed to make a follow-up appointment?\"  Pt. Response: No.       \"When you see the provider, I would recommend that you bring your discharge instructions with you.    Medications    \"How many new medications are you on since your hospitalization/ED visit?\"    0-1  \"How many of your current medicines changed (dose, timing, name, etc.) while you were in the hospital/ED visit?\"   0-1  \"Do you have questions about your medications?\"   No  \"Were you newly diagnosed with heart failure, COPD, diabetes or did you have a heart attack?\"   No  For patients on insulin: \"Did you start on insulin in the hospital or did you have your insulin dose changed?\"   No    Medication reconciliation completed? Yes    Was MTM referral placed (*Make sure to put transitions as reason for referral)?   No    Call Summary    \"Do you have any questions or concerns about your condition or care plan at the moment?\"    No  Triage nurse advice given: F/U with surgeon as scheduled, callback to f/u with PCP soon too (declined PCP visit at this time)    Patient was in ER two times in the past year (assess appropriateness of ER visits.)      \"If you have questions or things don't continue to improve, we encourage you contact us through the main clinic number,  [phone].  Even if " "the clinic is not open, triage nurses are available 24/7 to help you.     We would like you to know that our clinic has extended hours (provide information).  We also have urgent care (provide details on closest location and hours/contact info)\"      \"Thank you for your time and take care!\"    Fernanda MITCHELL RN        "

## 2019-04-05 NOTE — DISCHARGE SUMMARY
THORACIC SURGERY HOSPITAL DISCHARGE SUMMARY  [facility] ONCOLOGY - THORACIC SURGERY  [address], [address]  [place] [address]  Phone [phone]  [email or web address]    4/5/2019     Karson Bishop   [address] / [place] [address]  Phone: [phone]   Fax: [phone]        Re: Hermilo Velasquez             11/3/1932             [id number]              Dates of Hospitalization: 3/26/2019 - 3/29/2019   Date of Service (when I saw the patient): 3/29/10    Dear Dr. Bishop:     As you are aware, we had the pleasure of caring for your patient,  Hermilo Velasquez here at [facility].  He is an 86-year-old gentleman with a history of lymphoma.  He has some comorbidity.  He was found to have an enlarging nodule at the base of the right lower lobe lung laterally.  This nodule is suspicious for malignancy.  Options, diagnostic procedure and treatment were reviewed.  It was elected to proceed with limited thoracotomy and wedge resection        On 3/26/2019, Dr. Jose A Vasques performed the following:    Procedure/Surgery Information   Procedure: Limited right thoracotomy with wedge resection of right lower lobe lung nodule.    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 2.5 cm in greatest dimension. Surgical margin negative for tumor. JAMILAH present. Final pathologic stage [id number], Final surgical stage IA3.    His post-operative course was unremarkable.      Consultations This Hospital Stay   HOSPITALIST IP CONSULT    Hermilo Velasquez has otherwise  recovered sufficiently to be discharged to home today, 3/29/2019, on post-operative day number three for further convalescence.  His incisions are healing well with no signs or symptoms of infection.  His bowels have moved sufficiently and he is tolerating diet and activity, ambulating and transferring independently.  He is currently afebrile with stable vital signs.     Below, you will find a full discharge medication list and instructions.  We have arranged for Hermilo Velasquez to follow-up with us in our [facility] in 7-10 days with a Chest X-ray prior to that appointment.  We thank you for allowing us to participate in the care of Hermilo Velasquez here at [facility].  Please feel free to contact our office at [phone] with any questions or concerns or if we can be of any further assistance in the care of this patient.    Sincerely,    Dr. Jose A Vasques MD    D/C Summary Prepared by: Olena Hernandez PA-C    Discharge Medications:  Discharge Medication List as of 3/29/2019 12:22 PM      START taking these medications    Details   HYDROcodone-acetaminophen (NORCO) 5-325 MG tablet Take 1 tablet by mouth every 6 hours as needed for moderate to severe pain, Disp-28 tablet, R-0, Local Print         CONTINUE these medications which have CHANGED    Details   acetaminophen (TYLENOL) 325 MG tablet Take 2 tablets (650 mg) by mouth every 6 hours as needed for mild pain, Disp-100 tablet, Historical         CONTINUE these medications which have NOT CHANGED    Details   albuterol (PROAIR HFA/PROVENTIL HFA/VENTOLIN HFA) 108 (90 Base) MCG/ACT Inhaler Inhale 1-2 puffs into the lungs every 6 hours as needed for shortness of breath / dyspnea or wheezing, Disp-3 Inhaler, R-5, E-PrescribePLEASE DISPENSE PROAIR HFA, this is preferred by insurance      atorvastatin (LIPITOR) 10 MG tablet Take 1 tablet (10 mg) by mouth daily, Disp-90 tablet, R-0, E-PrescribeMD visit required for further refills       camphor-menthol (DERMASARRA) 0.5-0.5 % LOTN Apply a thin layer to itchy areas as often as desired, Disp-444 mL, R-11, E-Prescribe      ferrous sulfate (FEROSUL) 325 (65 Fe) MG tablet Take 1 tablet (325 mg) by mouth every other day, Disp-60 tablet, R-3, E-PrescribeDose change      gabapentin (NEURONTIN) 100 MG capsule Take 100 mg by mouth 2 times daily, Historical      hydrOXYzine (ATARAX) 25 MG tablet Take 2 tablets (50 mg) by mouth every 6 hours as needed for itching, Disp-120 tablet, R-3, E-Prescribe      loratadine (CLARITIN) 10 MG tablet Take 10 mg by mouth every morning , Historical      metoprolol succinate (TOPROL-XL) 100 MG 24 hr tablet Take 0.5 tablets (50 mg) by mouth daily, Disp-90 tablet, R-3, E-Prescribe      ranitidine (ZANTAC) 300 MG tablet Take 1 tablet (300 mg) by mouth 2 times daily, Disp-180 tablet, R-3, E-Prescribe      tiotropium (SPIRIVA HANDIHALER) 18 MCG capsule Inhale 1 capsule (18 mcg) into the lungs daily, Disp-90 capsule, R-3, E-Prescribe      triamcinolone (KENALOG) 0.1 % cream Apply topically 2 times daily as needed for irritationHistorical      warfarin (COUMADIN) 5 MG tablet TAKE ONE TABLET BY MOUTH ONE TIME DAILY , Disp-90 tablet, R-0, E-Prescribe               Discharge Instructions:  1) Remove chest tube dressing on 3/31/19 and then it is Ok to shower.  Please wash both incision and chest tube site daily with soap and water.  You may cover the chest tube site daily with a clean band-aid or dry gauze if it continues to drain.  Once it stops draining, leave the site open to air and it will form a scab.  2) Steri-strips can be removed in 1 week or they will fall off when they are ready.  3) Continue daily use of your Incentive Spirometer, set of 10x in a row, every 1-2 hours while you are awake during the day. Also use your flutter valve if you received one during your stay.   4) No lifting, pushing or pulling >8-10 lbs for 4-6 weeks from the day of your surgery.  No driving while  on narcotic pain medications.    Follow-Up Care:  1) Follow up with Olena Hernandez PA-C/Dr. Vasques at the [place] [facility] in [place] ([address], [address], [place] [address]).  Call [facility] at [phone] to schedule the appointment.  2) Follow up with Primary Care Provider, Karson Bishop within 1 month of discharge for routine post-surgical care, wound check and follow up.  Please call [phone] to arrange this appointment.       CC  Patient Care Team:  Karson Bishop MD as PCP - General (Internal Medicine)  Eduardo Aguirre MD as MD (Orthopedics)  Karson Bishop MD as Assigned PCP

## 2019-04-08 ENCOUNTER — TRANSFERRED RECORDS (OUTPATIENT)
Dept: HEALTH INFORMATION MANAGEMENT | Facility: CLINIC | Age: 84
End: 2019-04-08

## 2019-04-10 ENCOUNTER — ANTICOAGULATION THERAPY VISIT (OUTPATIENT)
Dept: NURSING | Facility: CLINIC | Age: 84
End: 2019-04-10
Payer: COMMERCIAL

## 2019-04-10 DIAGNOSIS — I48.0 PAROXYSMAL ATRIAL FIBRILLATION (H): ICD-10-CM

## 2019-04-10 DIAGNOSIS — I26.99 PULMONARY EMBOLISM, BILATERAL (H): ICD-10-CM

## 2019-04-10 LAB — INR POINT OF CARE: 1.9 (ref 0.86–1.14)

## 2019-04-10 PROCEDURE — 36416 COLLJ CAPILLARY BLOOD SPEC: CPT

## 2019-04-10 PROCEDURE — 85610 PROTHROMBIN TIME: CPT | Mod: QW

## 2019-04-10 NOTE — PROGRESS NOTES
ANTICOAGULATION FOLLOW-UP CLINIC VISIT    Patient Name:  Hermilo Velasquez  Date:  4/10/2019  Contact Type:  Face to Face    SUBJECTIVE:        OBJECTIVE    INR Protime   Date Value Ref Range Status   04/10/2019 1.9 (A) 0.86 - 1.14 Final       ASSESSMENT / PLAN  INR assessment THER    Recheck INR In: 1 WEEK    INR Location Clinic      Anticoagulation Summary  As of 4/10/2019    INR goal:   2.0-3.0   TTR:   48.8 % (3.1 y)   INR used for dosin.9! (4/10/2019)   Warfarin maintenance plan:   5 mg (5 mg x 1) every day   Full warfarin instructions:   4/10: 7.5 mg; : 7.5 mg; Otherwise 5 mg every day   Weekly warfarin total:   35 mg   Weekly max warfarin dose:   45 mg   Plan last modified:   Dione Giron RN (2019)   Next INR check:   2019   Target end date:   Indefinite    Indications    Long-term (current) use of anticoagulants [Z79.01] [Z79.01]  Pulmonary embolism  bilateral (H) [I26.99]  Paroxysmal atrial fibrillation (H) [I48.0]             Anticoagulation Episode Summary     INR check location:   Anticoagulation Clinic    Preferred lab:       Send INR reminders to:   CS ANTICOAGULATION    Comments:         Anticoagulation Care Providers     Provider Role Specialty Phone number    Karson Bishop MD [facility] Internal Medicine [phone]            See the Encounter Report to view Anticoagulation Flowsheet and Dosing Calendar (Go to Encounters tab in chart review, and find the Anticoagulation Therapy Visit)    Dosage adjustment made based on physician directed care plan.    Dione Giron RN

## 2019-04-17 ENCOUNTER — ANTICOAGULATION THERAPY VISIT (OUTPATIENT)
Dept: NURSING | Facility: CLINIC | Age: 84
End: 2019-04-17
Payer: COMMERCIAL

## 2019-04-17 DIAGNOSIS — I48.0 PAROXYSMAL ATRIAL FIBRILLATION (H): ICD-10-CM

## 2019-04-17 DIAGNOSIS — I26.99 PULMONARY EMBOLISM, BILATERAL (H): ICD-10-CM

## 2019-04-17 LAB — INR POINT OF CARE: 3.1 (ref 0.86–1.14)

## 2019-04-17 PROCEDURE — 99207 ZZC NO CHARGE NURSE ONLY: CPT

## 2019-04-17 PROCEDURE — 85610 PROTHROMBIN TIME: CPT | Mod: QW

## 2019-04-17 PROCEDURE — 36416 COLLJ CAPILLARY BLOOD SPEC: CPT

## 2019-04-17 NOTE — PROGRESS NOTES
ANTICOAGULATION FOLLOW-UP CLINIC VISIT    Patient Name:  Hermilo Velasquez  Date:  4/17/2019  Contact Type:  Face to Face    SUBJECTIVE:     Patient Findings     Comments:   The patient was assessed for diet, medication, and activity level changes, missed or extra doses, bruising or bleeding, with no problem findings.             OBJECTIVE    INR Protime   Date Value Ref Range Status   04/17/2019 3.1 (A) 0.86 - 1.14 Final       ASSESSMENT / PLAN  INR assessment THER    Recheck INR In: 2 WEEKS    INR Location Clinic      Anticoagulation Summary  As of 4/17/2019    INR goal:   2.0-3.0   TTR:   49.0 % (3.1 y)   INR used for dosing:   3.1! (4/17/2019)   Warfarin maintenance plan:   7.5 mg (5 mg x 1.5) every Sat; 5 mg (5 mg x 1) all other days   Full warfarin instructions:   7.5 mg every Sat; 5 mg all other days   Weekly warfarin total:   37.5 mg   Weekly max warfarin dose:   45 mg   Plan last modified:   Analisa Vigil RN (4/17/2019)   Next INR check:   5/1/2019   Target end date:   Indefinite    Indications    Long-term (current) use of anticoagulants [Z79.01] [Z79.01]  Pulmonary embolism  bilateral (H) [I26.99]  Paroxysmal atrial fibrillation (H) [I48.0]             Anticoagulation Episode Summary     INR check location:   Anticoagulation Clinic    Preferred lab:       Send INR reminders to:   CS ANTICOAGULATION    Comments:         Anticoagulation Care Providers     Provider Role Specialty Phone number    Karson Bishop MD [facility] Internal Medicine [phone]            See the Encounter Report to view Anticoagulation Flowsheet and Dosing Calendar (Go to Encounters tab in chart review, and find the Anticoagulation Therapy Visit)    Dosage adjustment made based on physician directed care plan. INR 3.1 after recent sub(s) following surgery and intentional hold.  Advised 7.5 mg Sa, 5 mg ROW and recheck INR 2 weeks. He insists on 3 weeks; I strongly encouraged two    Analisa Vigil RN

## 2019-05-03 ENCOUNTER — TRANSFERRED RECORDS (OUTPATIENT)
Dept: HEALTH INFORMATION MANAGEMENT | Facility: CLINIC | Age: 84
End: 2019-05-03

## 2019-05-09 ENCOUNTER — ANTICOAGULATION THERAPY VISIT (OUTPATIENT)
Dept: NURSING | Facility: CLINIC | Age: 84
End: 2019-05-09
Payer: COMMERCIAL

## 2019-05-09 DIAGNOSIS — I26.99 PULMONARY EMBOLISM, BILATERAL (H): ICD-10-CM

## 2019-05-09 DIAGNOSIS — I48.0 PAROXYSMAL ATRIAL FIBRILLATION (H): ICD-10-CM

## 2019-05-09 LAB — INR POINT OF CARE: 3.3 (ref 0.86–1.14)

## 2019-05-09 PROCEDURE — 36416 COLLJ CAPILLARY BLOOD SPEC: CPT

## 2019-05-09 PROCEDURE — 99207 ZZC NO CHARGE NURSE ONLY: CPT

## 2019-05-09 PROCEDURE — 85610 PROTHROMBIN TIME: CPT | Mod: QW

## 2019-05-09 NOTE — PROGRESS NOTES
ANTICOAGULATION FOLLOW-UP CLINIC VISIT    Patient Name:  Hermilo Velasquez  Date:  5/9/2019  Contact Type:  Face to Face    SUBJECTIVE:  Patient Findings     Comments:   Patient denies any identifiable changes that caused the supratherapeutic INR.         Clinical Outcomes     Comments:   Patient denies any identifiable changes that caused the supratherapeutic INR.            OBJECTIVE    INR Protime   Date Value Ref Range Status   05/09/2019 3.3 (A) 0.86 - 1.14 Final       ASSESSMENT / PLAN  INR assessment SUPRA    Recheck INR In: 4 WEEKS    INR Location Clinic      Anticoagulation Summary  As of 5/9/2019    INR goal:   2.0-3.0   TTR:   48.1 % (3.1 y)   INR used for dosing:   3.3! (5/9/2019)   Warfarin maintenance plan:   7.5 mg (5 mg x 1.5) every Sat; 5 mg (5 mg x 1) all other days   Full warfarin instructions:   7.5 mg every Sat; 5 mg all other days   Weekly warfarin total:   37.5 mg   Weekly max warfarin dose:   45 mg   Plan last modified:   Analisa Vigil RN (4/17/2019)   Next INR check:   6/5/2019   Target end date:   Indefinite    Indications    Long-term (current) use of anticoagulants [Z79.01] [Z79.01]  Pulmonary embolism  bilateral (H) [I26.99]  Paroxysmal atrial fibrillation (H) [I48.0]             Anticoagulation Episode Summary     INR check location:   Anticoagulation Clinic    Preferred lab:       Send INR reminders to:    ANTICOAGULATION    Comments:         Anticoagulation Care Providers     Provider Role Specialty Phone number    Karson Bishop MD [facility] Internal Medicine [phone]            See the Encounter Report to view Anticoagulation Flowsheet and Dosing Calendar (Go to Encounters tab in chart review, and find the Anticoagulation Therapy Visit)    Dosage adjustment made based on physician directed care plan.    Patient will increase dark greens for the next few days, then resume normal diet.      Some signs and symptoms of bleeding include: Nose bleed or cut that does not stop  bleeding in 10 minutes, bleeding of the gums, vomiting (will look like coffee grounds) or coughing up blood, unusual, easy or large areas of bruising, increased or unexpected vaginal bleeding or increased menstrual flow, red or black stools, red or orange urine, prolonged or severe headache, pale skin, unusual or constant tiredness.  If you have these please call 911 or seek medical care immediately.       Evie Mckeon RN

## 2019-05-13 ENCOUNTER — APPOINTMENT (OUTPATIENT)
Dept: GENERAL RADIOLOGY | Facility: CLINIC | Age: 84
DRG: 872 | End: 2019-05-13
Attending: EMERGENCY MEDICINE
Payer: COMMERCIAL

## 2019-05-13 ENCOUNTER — HOSPITAL ENCOUNTER (INPATIENT)
Facility: CLINIC | Age: 84
LOS: 4 days | Discharge: HOME | DRG: 872 | End: 2019-05-17
Attending: EMERGENCY MEDICINE | Admitting: INTERNAL MEDICINE
Payer: COMMERCIAL

## 2019-05-13 DIAGNOSIS — L03.113 CELLULITIS OF HAND, RIGHT: ICD-10-CM

## 2019-05-13 DIAGNOSIS — L03.113 CELLULITIS OF RIGHT UPPER EXTREMITY: Primary | ICD-10-CM

## 2019-05-13 DIAGNOSIS — M25.461 EFFUSION OF RIGHT KNEE: ICD-10-CM

## 2019-05-13 PROBLEM — L03.90 CELLULITIS: Status: ACTIVE | Noted: 2019-05-13

## 2019-05-13 LAB
ANION GAP SERPL CALCULATED.3IONS-SCNC: 5 MMOL/L (ref 3–14)
APPEARANCE FLD: NORMAL
BASOPHILS # BLD AUTO: 0 10E9/L (ref 0–0.2)
BASOPHILS NFR BLD AUTO: 0.1 %
BUN SERPL-MCNC: 21 MG/DL (ref 7–30)
CALCIUM SERPL-MCNC: 8.6 MG/DL (ref 8.5–10.1)
CHLORIDE SERPL-SCNC: 105 MMOL/L (ref 94–109)
CO2 SERPL-SCNC: 28 MMOL/L (ref 20–32)
COLOR FLD: YELLOW
CREAT SERPL-MCNC: 1.23 MG/DL (ref 0.66–1.25)
CRYSTALS SNV MICRO: NORMAL
DIFFERENTIAL METHOD BLD: ABNORMAL
EOSINOPHIL # BLD AUTO: 0.3 10E9/L (ref 0–0.7)
EOSINOPHIL NFR BLD AUTO: 2.2 %
ERYTHROCYTE [DISTWIDTH] IN BLOOD BY AUTOMATED COUNT: 13.6 % (ref 10–15)
GFR SERPL CREATININE-BSD FRML MDRD: 53 ML/MIN/{1.73_M2}
GLUCOSE FLD-MCNC: 6 MG/DL
GLUCOSE SERPL-MCNC: 107 MG/DL (ref 70–99)
GRAM STN SPEC: NORMAL
HCT VFR BLD AUTO: 37.6 % (ref 40–53)
HGB BLD-MCNC: 12.1 G/DL (ref 13.3–17.7)
IMM GRANULOCYTES # BLD: 0 10E9/L (ref 0–0.4)
IMM GRANULOCYTES NFR BLD: 0.2 %
INR PPP: 2.46 (ref 0.86–1.14)
LACTATE SERPL-SCNC: 0.9 MMOL/L (ref 0.4–2)
LYMPHOCYTES # BLD AUTO: 1.3 10E9/L (ref 0.8–5.3)
LYMPHOCYTES NFR BLD AUTO: 10.3 %
LYMPHOCYTES NFR FLD MANUAL: 3 %
MCH RBC QN AUTO: 27.7 PG (ref 26.5–33)
MCHC RBC AUTO-ENTMCNC: 32.2 G/DL (ref 31.5–36.5)
MCV RBC AUTO: 86 FL (ref 78–100)
MONOCYTES # BLD AUTO: 1.3 10E9/L (ref 0–1.3)
MONOCYTES NFR BLD AUTO: 10.2 %
MONOS+MACROS NFR FLD MANUAL: 1 %
NEUTROPHILS # BLD AUTO: 9.6 10E9/L (ref 1.6–8.3)
NEUTROPHILS NFR BLD AUTO: 77 %
NEUTS BAND NFR FLD MANUAL: 96 %
NRBC # BLD AUTO: 0 10*3/UL
NRBC BLD AUTO-RTO: 0 /100
PLATELET # BLD AUTO: 259 10E9/L (ref 150–450)
POTASSIUM SERPL-SCNC: 4.3 MMOL/L (ref 3.4–5.3)
PROT FLD-MCNC: 2.6 G/DL
RBC # BLD AUTO: 4.37 10E12/L (ref 4.4–5.9)
SODIUM SERPL-SCNC: 138 MMOL/L (ref 133–144)
SPECIMEN SOURCE FLD: NORMAL
SPECIMEN SOURCE SNV: NORMAL
SPECIMEN SOURCE: NORMAL
WBC # BLD AUTO: 12.4 10E9/L (ref 4–11)
WBC # FLD AUTO: NORMAL /UL

## 2019-05-13 PROCEDURE — 82945 GLUCOSE OTHER FLUID: CPT | Performed by: EMERGENCY MEDICINE

## 2019-05-13 PROCEDURE — 25000128 H RX IP 250 OP 636: Performed by: INTERNAL MEDICINE

## 2019-05-13 PROCEDURE — 85025 COMPLETE CBC W/AUTO DIFF WBC: CPT | Performed by: EMERGENCY MEDICINE

## 2019-05-13 PROCEDURE — 99223 1ST HOSP IP/OBS HIGH 75: CPT | Mod: AI | Performed by: INTERNAL MEDICINE

## 2019-05-13 PROCEDURE — 25000132 ZZH RX MED GY IP 250 OP 250 PS 637: Performed by: INTERNAL MEDICINE

## 2019-05-13 PROCEDURE — 83605 ASSAY OF LACTIC ACID: CPT | Performed by: EMERGENCY MEDICINE

## 2019-05-13 PROCEDURE — 80048 BASIC METABOLIC PNL TOTAL CA: CPT | Performed by: EMERGENCY MEDICINE

## 2019-05-13 PROCEDURE — 87205 SMEAR GRAM STAIN: CPT | Performed by: EMERGENCY MEDICINE

## 2019-05-13 PROCEDURE — 87040 BLOOD CULTURE FOR BACTERIA: CPT | Performed by: EMERGENCY MEDICINE

## 2019-05-13 PROCEDURE — 12000000 ZZH R&B MED SURG/OB

## 2019-05-13 PROCEDURE — 0S9C3ZX DRAINAGE OF RIGHT KNEE JOINT, PERCUTANEOUS APPROACH, DIAGNOSTIC: ICD-10-PCS | Performed by: EMERGENCY MEDICINE

## 2019-05-13 PROCEDURE — 73562 X-RAY EXAM OF KNEE 3: CPT | Mod: RT

## 2019-05-13 PROCEDURE — 99285 EMERGENCY DEPT VISIT HI MDM: CPT | Mod: 25

## 2019-05-13 PROCEDURE — 20611 DRAIN/INJ JOINT/BURSA W/US: CPT

## 2019-05-13 PROCEDURE — 25000128 H RX IP 250 OP 636: Performed by: EMERGENCY MEDICINE

## 2019-05-13 PROCEDURE — 89051 BODY FLUID CELL COUNT: CPT | Performed by: EMERGENCY MEDICINE

## 2019-05-13 PROCEDURE — 85610 PROTHROMBIN TIME: CPT | Performed by: EMERGENCY MEDICINE

## 2019-05-13 PROCEDURE — 96374 THER/PROPH/DIAG INJ IV PUSH: CPT

## 2019-05-13 PROCEDURE — 87015 SPECIMEN INFECT AGNT CONCNTJ: CPT | Performed by: EMERGENCY MEDICINE

## 2019-05-13 PROCEDURE — 96361 HYDRATE IV INFUSION ADD-ON: CPT

## 2019-05-13 PROCEDURE — 84157 ASSAY OF PROTEIN OTHER: CPT | Performed by: EMERGENCY MEDICINE

## 2019-05-13 PROCEDURE — 87070 CULTURE OTHR SPECIMN AEROBIC: CPT | Performed by: EMERGENCY MEDICINE

## 2019-05-13 PROCEDURE — 73130 X-RAY EXAM OF HAND: CPT | Mod: RT

## 2019-05-13 PROCEDURE — 89060 EXAM SYNOVIAL FLUID CRYSTALS: CPT | Performed by: EMERGENCY MEDICINE

## 2019-05-13 RX ORDER — OXYCODONE HYDROCHLORIDE 5 MG/1
5-10 TABLET ORAL
Status: DISCONTINUED | OUTPATIENT
Start: 2019-05-13 | End: 2019-05-17 | Stop reason: HOSPADM

## 2019-05-13 RX ORDER — CEFAZOLIN SODIUM 2 G/100ML
2 INJECTION, SOLUTION INTRAVENOUS ONCE
Status: COMPLETED | OUTPATIENT
Start: 2019-05-13 | End: 2019-05-13

## 2019-05-13 RX ORDER — DOXEPIN HYDROCHLORIDE 50 MG/1
50 CAPSULE ORAL DAILY
Status: DISCONTINUED | OUTPATIENT
Start: 2019-05-13 | End: 2019-05-17 | Stop reason: HOSPADM

## 2019-05-13 RX ORDER — WARFARIN SODIUM 5 MG/1
TABLET ORAL
COMMUNITY
End: 2019-05-28

## 2019-05-13 RX ORDER — DOXEPIN HYDROCHLORIDE 50 MG/1
50 CAPSULE ORAL DAILY
COMMUNITY
End: 2019-12-22

## 2019-05-13 RX ORDER — ONDANSETRON 4 MG/1
4 TABLET, ORALLY DISINTEGRATING ORAL EVERY 6 HOURS PRN
Status: DISCONTINUED | OUTPATIENT
Start: 2019-05-13 | End: 2019-05-17 | Stop reason: HOSPADM

## 2019-05-13 RX ORDER — HYDROXYZINE HYDROCHLORIDE 25 MG/1
50 TABLET, FILM COATED ORAL
COMMUNITY
End: 2019-08-28

## 2019-05-13 RX ORDER — GABAPENTIN 300 MG/1
300 CAPSULE ORAL 2 TIMES DAILY
Status: DISCONTINUED | OUTPATIENT
Start: 2019-05-13 | End: 2019-05-17 | Stop reason: HOSPADM

## 2019-05-13 RX ORDER — PANTOPRAZOLE SODIUM 40 MG/1
40 TABLET, DELAYED RELEASE ORAL DAILY
Status: DISCONTINUED | OUTPATIENT
Start: 2019-05-13 | End: 2019-05-17 | Stop reason: HOSPADM

## 2019-05-13 RX ORDER — ACETAMINOPHEN 650 MG/1
650 SUPPOSITORY RECTAL EVERY 4 HOURS PRN
Status: DISCONTINUED | OUTPATIENT
Start: 2019-05-13 | End: 2019-05-17 | Stop reason: HOSPADM

## 2019-05-13 RX ORDER — HYDROXYZINE HYDROCHLORIDE 25 MG/1
50 TABLET, FILM COATED ORAL
Status: DISCONTINUED | OUTPATIENT
Start: 2019-05-13 | End: 2019-05-17 | Stop reason: HOSPADM

## 2019-05-13 RX ORDER — NALOXONE HYDROCHLORIDE 0.4 MG/ML
.1-.4 INJECTION, SOLUTION INTRAMUSCULAR; INTRAVENOUS; SUBCUTANEOUS
Status: DISCONTINUED | OUTPATIENT
Start: 2019-05-13 | End: 2019-05-17 | Stop reason: HOSPADM

## 2019-05-13 RX ORDER — CEFAZOLIN SODIUM 1 G/3ML
1 INJECTION, POWDER, FOR SOLUTION INTRAMUSCULAR; INTRAVENOUS EVERY 8 HOURS
Status: DISCONTINUED | OUTPATIENT
Start: 2019-05-13 | End: 2019-05-14

## 2019-05-13 RX ORDER — TIOTROPIUM BROMIDE 18 UG/1
18 CAPSULE ORAL; RESPIRATORY (INHALATION) DAILY
Status: DISCONTINUED | OUTPATIENT
Start: 2019-05-13 | End: 2019-05-17 | Stop reason: HOSPADM

## 2019-05-13 RX ORDER — ONDANSETRON 2 MG/ML
4 INJECTION INTRAMUSCULAR; INTRAVENOUS EVERY 6 HOURS PRN
Status: DISCONTINUED | OUTPATIENT
Start: 2019-05-13 | End: 2019-05-17 | Stop reason: HOSPADM

## 2019-05-13 RX ORDER — AMOXICILLIN 250 MG
2 CAPSULE ORAL 2 TIMES DAILY PRN
Status: DISCONTINUED | OUTPATIENT
Start: 2019-05-13 | End: 2019-05-17 | Stop reason: HOSPADM

## 2019-05-13 RX ORDER — ACETAMINOPHEN 325 MG/1
650 TABLET ORAL EVERY 4 HOURS PRN
Status: DISCONTINUED | OUTPATIENT
Start: 2019-05-13 | End: 2019-05-17 | Stop reason: HOSPADM

## 2019-05-13 RX ORDER — PANTOPRAZOLE SODIUM 40 MG/1
40 TABLET, DELAYED RELEASE ORAL DAILY
COMMUNITY
End: 2019-11-29

## 2019-05-13 RX ORDER — ATORVASTATIN CALCIUM 10 MG/1
10 TABLET, FILM COATED ORAL DAILY
Status: DISCONTINUED | OUTPATIENT
Start: 2019-05-13 | End: 2019-05-17 | Stop reason: HOSPADM

## 2019-05-13 RX ORDER — GABAPENTIN 300 MG/1
300 CAPSULE ORAL DAILY
COMMUNITY
End: 2020-03-22

## 2019-05-13 RX ORDER — AMOXICILLIN 250 MG
1 CAPSULE ORAL 2 TIMES DAILY PRN
Status: DISCONTINUED | OUTPATIENT
Start: 2019-05-13 | End: 2019-05-17 | Stop reason: HOSPADM

## 2019-05-13 RX ORDER — METOPROLOL SUCCINATE 50 MG/1
50 TABLET, EXTENDED RELEASE ORAL DAILY
Status: DISCONTINUED | OUTPATIENT
Start: 2019-05-13 | End: 2019-05-17 | Stop reason: HOSPADM

## 2019-05-13 RX ADMIN — ATORVASTATIN CALCIUM 10 MG: 10 TABLET, FILM COATED ORAL at 17:35

## 2019-05-13 RX ADMIN — OXYCODONE HYDROCHLORIDE 5 MG: 5 TABLET ORAL at 16:10

## 2019-05-13 RX ADMIN — TIOTROPIUM BROMIDE 18 MCG: 18 CAPSULE ORAL; RESPIRATORY (INHALATION) at 19:33

## 2019-05-13 RX ADMIN — GABAPENTIN 300 MG: 300 CAPSULE ORAL at 21:47

## 2019-05-13 RX ADMIN — RANITIDINE 150 MG: 150 TABLET ORAL at 17:35

## 2019-05-13 RX ADMIN — METOPROLOL SUCCINATE 50 MG: 50 TABLET, EXTENDED RELEASE ORAL at 17:35

## 2019-05-13 RX ADMIN — PANTOPRAZOLE SODIUM 40 MG: 40 TABLET, DELAYED RELEASE ORAL at 17:35

## 2019-05-13 RX ADMIN — OXYCODONE HYDROCHLORIDE 5 MG: 5 TABLET ORAL at 19:32

## 2019-05-13 RX ADMIN — CEFAZOLIN 1 G: 1 INJECTION, POWDER, FOR SOLUTION INTRAMUSCULAR; INTRAVENOUS at 19:33

## 2019-05-13 RX ADMIN — DOXEPIN HYDROCHLORIDE 50 MG: 50 CAPSULE ORAL at 19:32

## 2019-05-13 RX ADMIN — CEFAZOLIN SODIUM 2 G: 2 INJECTION, SOLUTION INTRAVENOUS at 11:50

## 2019-05-13 RX ADMIN — SODIUM CHLORIDE 1000 ML: 9 INJECTION, SOLUTION INTRAVENOUS at 11:50

## 2019-05-13 ASSESSMENT — ENCOUNTER SYMPTOMS
FEVER: 0
WOUND: 1
SHORTNESS OF BREATH: 0
WEAKNESS: 0
COLOR CHANGE: 1
ARTHRALGIAS: 1
VOMITING: 0

## 2019-05-13 ASSESSMENT — ACTIVITIES OF DAILY LIVING (ADL)
RETIRED_COMMUNICATION: 0-->UNDERSTANDS/COMMUNICATES WITHOUT DIFFICULTY
TOILETING: 0-->INDEPENDENT
ADLS_ACUITY_SCORE: 14
ADLS_ACUITY_SCORE: 14
SWALLOWING: 0-->SWALLOWS FOODS/LIQUIDS WITHOUT DIFFICULTY
BATHING: 0-->INDEPENDENT
WHICH_OF_THE_ABOVE_FUNCTIONAL_RISKS_HAD_A_RECENT_ONSET_OR_CHANGE?: AMBULATION
DRESS: 0-->INDEPENDENT
RETIRED_EATING: 0-->INDEPENDENT

## 2019-05-13 ASSESSMENT — MIFFLIN-ST. JEOR: SCORE: 1502.72

## 2019-05-13 NOTE — ED PROVIDER NOTES
"  History     Chief Complaint:  Finger pain    HPI   Hermilo Velasquez is a right handed 86 year old male, with a history of COPD, HLD, PE, atrial fibrillation, and Non-hodgkin's lymphoma, who is on Warfarin, who presents with his son to the emergency department for evaluation of right index finger pain. The patient reports he was cutting metal gutters using tin snips three days prior to evaluation and received a small wound in the webbing between his right index and middle fingers. He now reports warmth, pain, and redness to his index finger. He does not believe there is metal in the actual wound. He also reports right knee pain that he has been experiencing for \"50 years\". He notes he is now unable to walk on his right leg due to pain. He denies any chest pain, shortness of breath, nausea, fever, or weakness.    Allergies:  Lidocaine: blisters  Omeprazole: itching  Pantoprazole: itching  Prevacid: itching  Lasix: rash  Penicillins: rash     Medications:    Albuterol  Lipitor  Gabapentin  Hydroxyzine  Metoprolol  Ranitidine  Spiriva  Warfarin    Past Medical History:    Spongiotic dermatitis  Anemia  COPD  HLD  HTN  Non-hodgkin's lymphoma  PE  Osteoarthritis  Atrial fibrillation  GERD  Hernia  Aortic stenosis  DVT  Heart murmur  Monoclonal gammopathy  Neuropathy  Malignant lymphomas  RBBB  Severe sepsis with acute organ dysfunction    Past Surgical History:    Appendectomy  Back surgery  Hernia repair  Herniorrhaphy  Lobectomy  Left ankle ligament repair  Replace aortic valve  Bilateral rotator cuff repair  Spine surgery x3  Thoracotomy  Tonsillectomy  TURP    Family History:    CAD  Emphysema    Social History:  Smoking status: Former smoker of 2.0 ppd for 55 years. Quit date: 1/22/1998  Alcohol use: Yes, daily  The patient presents to the emergency department with his son.  Marital Status:   [2]     Review of Systems   Constitutional: Negative for fever.   Respiratory: Negative for shortness of breath.  " "  Cardiovascular: Negative for chest pain.   Gastrointestinal: Negative for vomiting.   Musculoskeletal: Positive for arthralgias.   Skin: Positive for color change and wound.   Neurological: Negative for weakness.   All other systems reviewed and are negative.      Physical Exam   Patient Vitals for the past 24 hrs:   BP Temp Temp src Pulse Resp SpO2 Height Weight   05/13/19 1022 99/53 98.8  F (37.1  C) Oral 102 16 99 % 1.778 m (5' 10\") 81.6 kg (180 lb)     Physical Exam  General: Resting comfortably on the gurney  Head:  The scalp, face, and head appear normal  Eyes:  The pupils are equal, round, and reactive to light    There is no nystagmus    Extraocular muscles are intact    Conjunctivae and sclerae are normal  ENT:    The nose is normal    Pinnae are normal    The oropharynx is normal    Uvula is in the midline  Neck:  Normal range of motion    There is no rigidity noted    There is no midline cervical spine pain/tenderness    Trachea is in the midline    No mass is detected  CV:  Regular rate     Normal S1/S2    No pathological murmur detected  Resp:  Lungs are clear    There is no tachypnea    Non-labored    No rales    Trace chronic expiratory wheezing.  GI:  Abdomen is soft, there is no rigidity    No distension    No tympani    No rebound tenderness     Non-surgical without peritoneal features  MS:  Normal muscular tone    Symmetric motor strength    Extensive swelling to right index finger.     No definitive septic joint    Appears to be cellulitis to index finger dorsum of the hand and 1/3 of the dorsal radial distal forearm.    Right knee no prior surgery. Tenderness with movement and mildly warm, possible small palpable effusion. No redness noted, the knee appears arthritic.    No asymmetric leg swelling, no calf tenderness  Skin:  Right Hand: Skin is tender, red and hot. No clear lymphangitic streaking up right arm.     Prior left knee surgical scar totally healed  Neuro: Speech is normal and " fluent  Psych:  Awake. Alert.      Normal affect.  Appropriate interactions.  Lymph: No anterior cervical lymphadenopathy noted    Emergency Department Course   Imaging:  Radiographic findings were communicated with the patient who voiced understanding of the findings.    XR Knee Right 3 Views  IMPRESSION: Degenerative change. No acute abnormality.  As read by Radiology.    XR Hand Right G/E 3 Views  IMPRESSION: No radiopaque foreign body.    Laboratory:  CBC: WBC 12.4 (H), HGB 12.1 (L) o/w WNL ()  BMP: Glucose 107 (H), GFR Estimate 53 (L) o/w WNL (Creatinine 1.23)  Blood cultures: pending    Lactic Acid (1128): 0.9   INR: 2.46 (H)    Labs Ordered and Resulted from Time of ED Arrival Up to the Time of Departure from the ED   CBC WITH PLATELETS DIFFERENTIAL - Abnormal; Notable for the following components:       Result Value    WBC 12.4 (*)     RBC Count 4.37 (*)     Hemoglobin 12.1 (*)     Hematocrit 37.6 (*)     Absolute Neutrophil 9.6 (*)     All other components within normal limits   BASIC METABOLIC PANEL - Abnormal; Notable for the following components:    Glucose 107 (*)     GFR Estimate 53 (*)     All other components within normal limits   INR - Abnormal; Notable for the following components:    INR 2.46 (*)     All other components within normal limits   Cell count involving the right knee revealed 27,000 white blood cells with 96% neutrophils.  Glucose is 6.  Gram stain negative.  Culture pending.  Preliminary crystals are negative.    Procedures:  Arthrocentesis  After consent was obtained, using sterile technique the right knee was prepped and plain 0.5% bupivacaine plain was used as local anesthetic. The joint was entered and 18 cc's of yellow colored fluid was withdrawn and sent for culture. The procedure was well tolerated.  All fluid was sent to the laboratory for analysis.    Interventions:  1150 NS 1L IV Bolus  1150 Ancef 2 g IV    Emergency Department Course:  Past medical records, nursing  notes, and vitals reviewed.  1047: I performed an exam of the patient and obtained history, as documented above.     IV inserted and blood drawn.    The patient was sent for a knee and hand xrays while in the emergency department, findings above.    1239: I performed an arthrocentesis, as stated above.     Findings and plan explained to the Patient who consents to admission.     1309: Discussed the patient with Dr. Rodríguez, who will admit the patient to a medical bed for further monitoring, evaluation, and treatment.   Impression & Plan    Medical Decision Making:  This patient presents with a red warm tender right hand and in particular the right index finger is significantly swollen.  There is no definitive extensor or flexor involvement.  The finger and hand appear significantly cellulitic.  There is no definitive lymphangitic streaking up the arm.  In the several hours in the emergency department, the infection did seem to advance slightly.  There is low likelihood of deep venous thrombosis, the patient is anticoagulated.  Patient was also noted to have a right knee effusion with new onset pain.  He does have a history of osteoarthritis involving the right knee and history of left total knee arthroplasty.  There was increased pain with motion on the knee and palpable effusion.  The effusion was seen radiographically.  The joint itself is not red.  I decided to perform an arthrocentesis of the right knee in case this had become seeded hematogenously from the right hand infection.  Patient is noted to have significant inflammatory changes to the joint fluid in the range of 20,000 white blood cells with a left shift.  This could indicate inflammatory arthropathy or early bacterial infection.  Culture is pending at this time and empiric antibiotics have been administered for strep and staph.  Patient will be admitted to the hospital at this time for intravenous antibiotics to cover the finger and hand and also to  empirically cover the knee.  Infectious disease consultation is suggested.  The left total knee arthroplasty, although asymptomatic at this time, will need to be monitored closely.        Diagnosis:    ICD-10-CM   1. Cellulitis of hand, right L03.113   2. Effusion of right knee M25.461     Disposition:  Admitted to a medical bed.  Ella Taylor  5/13/2019    EMERGENCY DEPARTMENT  Scribe Disclosure:  I, Ella Taylor, am serving as a scribe at 10:47 AM on 5/13/2019 to document services personally performed by Alexander Charlton MD based on my observations and the provider's statements to me.      Alexander Charlton MD  05/13/19 1953

## 2019-05-13 NOTE — H&P
[facility]    History and Physical - Hospitalist Service       Date of Admission:  5/13/2019    Assessment & Plan   Hermilo Velasquez is a 86 year old male with a history of COPD, atrial fibrillation, PE and non-hodgkin's lymphoma who presented to the ED on 5/13/2019 with right hand swelling, erythema and pain concerning for cellulitis     Right hand cellulitis  Sepsis   On Friday patient was working with tin cutters when he cut himself on some metal at the 2nd webspace on the right.  Since then he has had worsening swelling, erythema and pain to the right index finger extending up in to the hand and forearm.    Patient did meet 2/4 SIRS criteria with leukocytosis and tachycardiac on presentation.  Fortunately his lactate was normal and no evidence of organ dysfunction to suggest severe sepsis.  Cultures were obtained and the patient was started on IV Ancef  - Admission to medical bed  - Skin outlined and monitor  - Follow cultures   - Continue IV Ancef  - Orthopedic surgery consulted and appreciate their assistance     Right knee osteoarthritis   Also worsening right knee pain over the weekend.  Has known osteoarthritis of the knee and actually had a steroid injection approximately 6 months ago.  In the ED the joint was aspirated  - Awaiting fluid studies  - Follow cultures   - Orthopedic surgery consult     H/o Paroxysmal atrial fibrillation   Prior PE   H/o AS s/p bioprosthetic AV  On Coumadin chronically and INR therapeutic today.    - PTA Metoprolol for rate control   - Holding Coumadin due to surgical evaluation    H/o COPD  Not currently in exacerbation   - PTA Tiotropium     Non-Hodgkins lymphoma   No issues currently  - Follow up as outpatient      Diet: Regular diet   DVT Prophylaxis: Pneumatic Compression Devices  Sauzo Catheter: not present  Code Status: Full Code     Disposition Plan   Expected discharge: 2 - 3 days, recommended to prior living arrangement once SIRS/Sepsis  treated.  Entered: Jose Miguel Rodríguez DO 05/13/2019, 1:29 PM     The patient's care was discussed with the Patient, Patient's Family and ED staff.    Jose Miguel RodríguezDO  [facility]    ______________________________________________________________________    Chief Complaint   Right hand swelling and erythema     History is obtained from the patient    History of Present Illness   Hermilo Velasquez is a 86 year old male who presented with right hand swelling, erythema and pain.  On Friday patient was working with tin cutters when he cut himself on some metal at the 2nd webspace on the right.  Since then he has had worsening swelling, erythema, warmth and pain to the right index finger extending up in to the hand and forearm.  He denies any fevers, chills, diaphoresis.  No chest pain, SOB, abdominal pain, N/V/D, problems with urination, leg pain or leg swelling.      He also does note worsening right knee pain.  He states that this has been present for years and Dr. Bishop actually did a steroid injection 6 months ago with good relief up until recently.  He has already had his left knee replaced    Review of Systems    The 10 point Review of Systems is negative other than noted in the HPI    Past Medical History    I have reviewed this patient's medical history and updated it with pertinent information if needed.   Past Medical History:   Diagnosis Date     Aortic stenosis     Severe AS, 9/2015 AVR - [facility] TRIFECTA Bovine bioprosthesis 25MM TF-25A     Atrial fibrillation (H)     9/2015 Paroxysmal post op Afib - discharged on Warfarin and a beta blocker     Deep vein thrombosis (H)      GERD (gastroesophageal reflux disease)      Heart murmur      Monoclonal gammopathy     plasmacyte prominent causing monoclonal gammopathy     Need for SBE (subacute bacterial endocarditis) prophylaxis      Neuropathy      Other and unspecified hyperlipidemia      Other malignant lymphomas     non hodgkin's  lymphoma     RBBB (right bundle branch block)      Severe sepsis with acute organ dysfunction (H) 11/16/2015     Unspecified hereditary and idiopathic peripheral neuropathy        Past Surgical History   I have reviewed this patient's surgical history and updated it with pertinent information if needed.  Past Surgical History:   Procedure Laterality Date     APPENDECTOMY       AS TOTAL KNEE ARTHROPLASTY       BACK SURGERY       ESOPHAGOSCOPY, GASTROSCOPY, DUODENOSCOPY (EGD), COMBINED N/A 11/28/2015    Procedure: COMBINED ESOPHAGOSCOPY, GASTROSCOPY, DUODENOSCOPY (EGD);  Surgeon: Danis Castillo MD;  Location:  GI     ESOPHAGOSCOPY, GASTROSCOPY, DUODENOSCOPY (EGD), COMBINED N/A 7/26/2018    Procedure: COMBINED ESOPHAGOSCOPY, GASTROSCOPY, DUODENOSCOPY (EGD);;  Surgeon: Toby Dong DO;  Location:  GI     HERNIA REPAIR  2006     HERNIORRHAPHY VENTRAL  4/17/2013    Procedure: HERNIORRHAPHY VENTRAL;  VENTRAL HERNIA REPAIR WITH MESH;  Surgeon: Patel Guzman MD;  Location:  OR     KNEE SURGERY      arthroscopic right knee surgery      LOBECTOMY LUNG Right 3/26/2019    Procedure: POSSIBLE LOBECTOMY LUNG;  Surgeon: Jose A Vasques MD;  Location:  OR     REPAIR LIGAMENT ANKLE  2/23/2012    Procedure:REPAIR LIGAMENT ANKLE; LEFT TARSAL TUNNEL RELEASE OF KNOT OF ARIEL RELEASE; Surgeon:SAUL PUENTE; Location: OR     REPLACE VALVE AORTIC N/A 9/3/2015    Procedure: REPLACE VALVE AORTIC;  Surgeon: Antonino Mitchell MD;  Location:  OR     ROTATOR CUFF REPAIR RT/LT      bilateral     SPINE SURGERY      3 spine surgeries     THORACOTOMY, WEDGE RESECTION LUNG, COMBINED Right 3/26/2019    Procedure: RIGHT THORACOTOMY, WEDGE RESECTION, RIGHT LOWER LOBE LUNG NODULE,;  Surgeon: Jose A Vasques MD;  Location:  OR     TONSILLECTOMY       TURP         Social History   I have reviewed this patient's social history and updated it with pertinent information if needed.  Social  History     Tobacco Use     Smoking status: Former Smoker     Packs/day: 2.00     Years: 55.00     Pack years: 110.00     Last attempt to quit: 1998     Years since quittin.3     Smokeless tobacco: Never Used   Substance Use Topics     Alcohol use: Yes     Alcohol/week: 0.0 oz     Comment: 1 drink per day     Drug use: No       Family History   I have reviewed this patient's family history and updated it with pertinent information if needed.   Family History   Problem Relation Age of Onset     C.A.D. Father      Emphysema Father      Melanoma No family hx of      Skin Cancer No family hx of        Prior to Admission Medications   Prior to Admission Medications   Prescriptions Last Dose Informant Patient Reported? Taking?   HYDROcodone-acetaminophen (NORCO) 5-325 MG tablet   No No   Sig: Take 1 tablet by mouth every 6 hours as needed for moderate to severe pain   acetaminophen (TYLENOL) 325 MG tablet   Yes No   Sig: Take 2 tablets (650 mg) by mouth every 6 hours as needed for mild pain   albuterol (PROAIR HFA/PROVENTIL HFA/VENTOLIN HFA) 108 (90 Base) MCG/ACT Inhaler  Self No No   Sig: Inhale 1-2 puffs into the lungs every 6 hours as needed for shortness of breath / dyspnea or wheezing   atorvastatin (LIPITOR) 10 MG tablet  Self No No   Sig: Take 1 tablet (10 mg) by mouth daily   camphor-menthol (DERMASARRA) 0.5-0.5 % LOTN  Self No No   Sig: Apply a thin layer to itchy areas as often as desired   Patient taking differently: every morning Apply a thin layer to itchy areas as often as desired   ferrous sulfate (FEROSUL) 325 (65 Fe) MG tablet  Self No No   Sig: Take 1 tablet (325 mg) by mouth every other day   Patient taking differently: Take 325 mg by mouth At Bedtime    gabapentin (NEURONTIN) 100 MG capsule  Self Yes No   Sig: Take 100 mg by mouth 2 times daily   hydrOXYzine (ATARAX) 25 MG tablet  Self No No   Sig: Take 2 tablets (50 mg) by mouth every 6 hours as needed for itching   Patient taking  "differently: Take 50 mg by mouth At Bedtime    loratadine (CLARITIN) 10 MG tablet  Self Yes No   Sig: Take 10 mg by mouth every morning    metoprolol succinate (TOPROL-XL) 100 MG 24 hr tablet  Self No No   Sig: Take 0.5 tablets (50 mg) by mouth daily   ranitidine (ZANTAC) 300 MG tablet  Self No No   Sig: Take 1 tablet (300 mg) by mouth 2 times daily   Patient taking differently: Take 150 mg by mouth daily    tiotropium (SPIRIVA HANDIHALER) 18 MCG capsule  Self No No   Sig: Inhale 1 capsule (18 mcg) into the lungs daily   triamcinolone (KENALOG) 0.1 % cream  Self Yes No   Sig: Apply topically 2 times daily as needed for irritation   warfarin (COUMADIN) 5 MG tablet  Self No No   Sig: TAKE ONE TABLET BY MOUTH ONE TIME DAILY       Facility-Administered Medications: None     Allergies   Allergies   Allergen Reactions     Lidocaine Blisters     Allergy to lidocaine ointment     Omeprazole Itching     Pantoprazole Itching     Prevacid [Lansoprazole] Itching     Lasix [Furosemide] Rash     Penicillins Rash     \"broke out from injection\" 60 yrs ago         Physical Exam   Vital Signs: Temp: 98.8  F (37.1  C) Temp src: Oral BP: 99/53 Pulse: 102   Resp: 16 SpO2: 99 % O2 Device: None (Room air)    Weight: 180 lbs 0 oz    General Appearance: Resting comfortably.  NAD  Eyes: EOMI.  Normal conjunctiva  HEENT: NC/AT.  Moist mucous membranes  Respiratory: Clear to auscultation.  No respiratory distress  Cardiovascular: RRR.  No obvious murmurs  GI: Bowel sounds present.  No tenderness   Skin: Right index finger is erythematous, swollen and warm.  The erythema extends up the dorsum of the hand to mid forearm  Musculoskeletal: No lower extremity edema.  No calf tenderness  Neurologic: No focal deficits.   CN intact  Psychiatric: Alert and oriented    Data   Data reviewed today: I reviewed all medications, new labs and imaging results over the last 24 hours. I personally reviewed   X-ray right knee:  Changes consistent with " osteoarthritis  X-ray right hand:  No obvious foreign bodies     Recent Labs   Lab 05/13/19  1128 05/09/19   WBC 12.4*  --    HGB 12.1*  --    MCV 86  --      --    INR 2.46* 3.3*     --    POTASSIUM 4.3  --    CHLORIDE 105  --    CO2 28  --    BUN 21  --    CR 1.23  --    ANIONGAP 5  --    AMRITA 8.6  --    *  --      Recent Results (from the past 24 hour(s))   XR Hand Right G/E 3 Views    Narrative    XR HAND RT G/E 3 VW 5/13/2019 11:43 AM    HISTORY: Possible metallic foreign body in the thumb or index finger.  Cellulitis.    COMPARISON: None.    FINDINGS: No radiopaque foreign body. Chronic arthropathy throughout  the hand and radial aspect of the wrist. Second finger soft tissue  swelling. No focal cortical erosion to specifically suggest  osteomyelitis.      Impression    IMPRESSION: No radiopaque foreign body.    URSULA BRANHAM MD   XR Knee Right 3 Views    Narrative    XR KNEE RT 3 VW 5/13/2019 11:43 AM    HISTORY: Pain, joint effusion.    COMPARISON: 7/28/2018    FINDINGS: Prominent joint effusion on the lateral view. Moderate  degenerative changes characterized by loss of joint space and moderate  marginal osteophyte formation. Chondrocalcinosis in the tibiofemoral  compartment. Patchy vascular calcification.      Impression    IMPRESSION: Degenerative change. No acute abnormality.    URSULA BRANHAM MD

## 2019-05-13 NOTE — CONSULTS
"[facility]    Orthopedic Consultation    Hermilo Velasquez MRN# [id number]   Age: 86 year old YOB: 1932     Date of Admission: 5/13/2019    Reason for consult: Right hand cellulitis  Right knee osteoarthritis       Requesting physician: Jose Miguel Rodríguez DO       Level of consult: Consult, follow and place orders           Assessment and Plan:   Assessment:   Right hand cellulitis involving the right index finger  Right knee advanced medial compartment osteoarthritis       Plan:   Continue IV antibiotics, Ancef as ordered in hopes this will resolve without surgical intervention.   Elevate Right arm above chest height when patient resting in bed  Will place patient NPO at midnight and recheck in am  Hold evening coumadin dose.     In regard to the right knee, this does not appear to be a septic joint.    Will continue to monitor culture results.  Out-patient cortisone injection would likely be beneficial once hand infection has resolved.    Ice right knee as needed  Minimize use of narcotic for knee pain.            Chief Complaint:   Right hand pain and right knee pain         History of Present Illness:   Hermilo Velasquez is a right handed 86 year old male, with a history of COPD, HLD, PE, atrial fibrillation, and Non-hodgkin's lymphoma, who is on Warfarin, who presented to the emergency department for evaluation of right index finger pain.   The patient reports he was cutting metal gutters using tin snips three days prior to evaluation and received a small wound in the webbing between his right index and middle fingers. He now reports warmth, pain, and redness to his index finger. He does not believe there is metal in the actual wound but does note that he did have a small amount of bleeding between the web of his index and long finger approximately 3 days ago.     He also reports right knee pain that he has been experiencing for \"50 years\". He notes he is now unable to walk on his " "right leg due to pain. The knee was aspirated in the emergency department and sent for cultures.  Patient states he has been getting injections in his knee for \"years\" by her primary provider.    Orthopedics was consulted for evaluation of his right hand and right knee.    Synovial fluid Cell count:  [id number]  WBC 12.4  INR 2.46          Past Medical History:     Past Medical History:   Diagnosis Date     Aortic stenosis     Severe AS, 9/2015 AVR - [facility] TRIFECTA Bovine bioprosthesis 25MM TF-25A     Atrial fibrillation (H)     9/2015 Paroxysmal post op Afib - discharged on Warfarin and a beta blocker     Deep vein thrombosis (H)      GERD (gastroesophageal reflux disease)      Heart murmur      Monoclonal gammopathy     plasmacyte prominent causing monoclonal gammopathy     Need for SBE (subacute bacterial endocarditis) prophylaxis      Neuropathy      Other and unspecified hyperlipidemia      Other malignant lymphomas     non hodgkin's lymphoma     RBBB (right bundle branch block)      Severe sepsis with acute organ dysfunction (H) 11/16/2015     Unspecified hereditary and idiopathic peripheral neuropathy              Past Surgical History:     Past Surgical History:   Procedure Laterality Date     APPENDECTOMY       AS TOTAL KNEE ARTHROPLASTY       BACK SURGERY       ESOPHAGOSCOPY, GASTROSCOPY, DUODENOSCOPY (EGD), COMBINED N/A 11/28/2015    Procedure: COMBINED ESOPHAGOSCOPY, GASTROSCOPY, DUODENOSCOPY (EGD);  Surgeon: Danis Castillo MD;  Location:  GI     ESOPHAGOSCOPY, GASTROSCOPY, DUODENOSCOPY (EGD), COMBINED N/A 7/26/2018    Procedure: COMBINED ESOPHAGOSCOPY, GASTROSCOPY, DUODENOSCOPY (EGD);;  Surgeon: Toby Dong DO;  Location:  GI     HERNIA REPAIR  2006     HERNIORRHAPHY VENTRAL  4/17/2013    Procedure: HERNIORRHAPHY VENTRAL;  VENTRAL HERNIA REPAIR WITH MESH;  Surgeon: Patel Guzman MD;  Location:  OR     KNEE SURGERY      arthroscopic right knee surgery      LOBECTOMY LUNG Right " "3/26/2019    Procedure: POSSIBLE LOBECTOMY LUNG;  Surgeon: Jose A Vasques MD;  Location: [facility] OR     REPAIR LIGAMENT ANKLE  2012    Procedure:REPAIR LIGAMENT ANKLE; LEFT TARSAL TUNNEL RELEASE OF KNOT OF ARIEL RELEASE; Surgeon:SAUL PUENTE; Location:[facility] OR     REPLACE VALVE AORTIC N/A 9/3/2015    Procedure: REPLACE VALVE AORTIC;  Surgeon: Antonino Mitchell MD;  Location: [facility] OR     ROTATOR CUFF REPAIR RT/LT      bilateral     SPINE SURGERY      3 spine surgeries     THORACOTOMY, WEDGE RESECTION LUNG, COMBINED Right 3/26/2019    Procedure: RIGHT THORACOTOMY, WEDGE RESECTION, RIGHT LOWER LOBE LUNG NODULE,;  Surgeon: Jose A Vasques MD;  Location: [facility] OR     TONSILLECTOMY       TURP               Social History:     Social History     Tobacco Use     Smoking status: Former Smoker     Packs/day: 2.00     Years: 55.00     Pack years: 110.00     Last attempt to quit: 1998     Years since quittin.3     Smokeless tobacco: Never Used   Substance Use Topics     Alcohol use: Yes     Alcohol/week: 0.0 oz     Comment: 1 drink per day             Family History:     Family History   Problem Relation Age of Onset     C.A.D. Father      Emphysema Father      Melanoma No family hx of      Skin Cancer No family hx of              Immunizations:     VACCINE/DOSE   Diptheria   DPT   DTAP   HBIG   Hepatitis A   Hepatitis B   HIB   Influenza   Measles   Meningococcal   MMR   Mumps   Pneumococcal   Polio   Rubella   Small Pox   TDAP   Varicella   Zoster             Allergies:     Allergies   Allergen Reactions     Lidocaine Blisters     Allergy to lidocaine ointment     Omeprazole Itching     Pantoprazole Itching     Prevacid [Lansoprazole] Itching     Lasix [Furosemide] Rash     Penicillins Rash     \"broke out from injection\" 60 yrs ago               Medications:     Current Facility-Administered Medications   Medication     acetaminophen (TYLENOL) Suppository 650 mg     acetaminophen " "(TYLENOL) tablet 650 mg     ceFAZolin (ANCEF) 1 g vial to attach to  ml bag for ADULT or 50 ml bag for PEDS     melatonin tablet 1 mg     naloxone (NARCAN) injection 0.1-0.4 mg     ondansetron (ZOFRAN-ODT) ODT tab 4 mg    Or     ondansetron (ZOFRAN) injection 4 mg     oxyCODONE (ROXICODONE) tablet 5-10 mg     senna-docusate (SENOKOT-S/PERICOLACE) 8.6-50 MG per tablet 1 tablet    Or     senna-docusate (SENOKOT-S/PERICOLACE) 8.6-50 MG per tablet 2 tablet             Review of Systems:   CV: NEGATIVE for chest pain, palpitations or peripheral edema  C: NEGATIVE for fever, chills, change in weight  E/M: NEGATIVE for ear, mouth and throat problems  R: NEGATIVE for significant cough or SOB          Physical Exam:   All vitals have been reviewed  Patient Vitals for the past 24 hrs:   BP Temp Temp src Pulse Resp SpO2 Height Weight   05/13/19 1447 139/69 98.7  F (37.1  C) Oral 102 20 92 % -- --   05/13/19 1022 99/53 98.8  F (37.1  C) Oral 102 16 99 % 1.778 m (5' 10\") 81.6 kg (180 lb)     No intake or output data in the 24 hours ending 05/13/19 5959    On physical exam the patient's right hand, he does have global erythema of the right index finger and along the dorsal aspect of hand and medial wrist.  Joint deformities noted from arthritic change at the DIP and PIP of the index finger.  He has mild pain with passive wrist flexion and extension.  Patient is tender to palpation along the extensor tendon of the index finger.  There is no tenderness along the flexor tendon.  Limited motion due to pain and swelling.  No pain with thumb flexion or extension.  Distal pulses are intact and equal bilaterally.  Capillary refill is less than 2 seconds.    On physical exam the patient's right knee, he does have a moderate joint effusion present.  There is no erythema noted.  He denies pain with passive flexion and extension of his knee.  Denies pain with hip rotation.  Patient is neurovascularly intact in both lower extremities " with distal pulses present.  Stable with valgus and varus stress testing and anterior drawer.          Data:   All laboratory data reviewed  Results for orders placed or performed during the hospital encounter of 05/13/19   XR Hand Right G/E 3 Views    Narrative    XR HAND RT G/E 3 VW 5/13/2019 11:43 AM    HISTORY: Possible metallic foreign body in the thumb or index finger.  Cellulitis.    COMPARISON: None.    FINDINGS: No radiopaque foreign body. Chronic arthropathy throughout  the hand and radial aspect of the wrist. Second finger soft tissue  swelling. No focal cortical erosion to specifically suggest  osteomyelitis.      Impression    IMPRESSION: No radiopaque foreign body.    URSULA BRANHAM MD   XR Knee Right 3 Views    Narrative    XR KNEE RT 3 VW 5/13/2019 11:43 AM    HISTORY: Pain, joint effusion.    COMPARISON: 7/28/2018    FINDINGS: Prominent joint effusion on the lateral view. Moderate  degenerative changes characterized by loss of joint space and moderate  marginal osteophyte formation. Chondrocalcinosis in the tibiofemoral  compartment. Patchy vascular calcification.      Impression    IMPRESSION: Degenerative change. No acute abnormality.    URSULA BRANHAM MD   CBC with platelets differential   Result Value Ref Range    WBC 12.4 (H) 4.0 - 11.0 10e9/L    RBC Count 4.37 (L) 4.4 - 5.9 10e12/L    Hemoglobin 12.1 (L) 13.3 - 17.7 g/dL    Hematocrit 37.6 (L) 40.0 - 53.0 %    MCV 86 78 - 100 fl    MCH 27.7 26.5 - 33.0 pg    MCHC 32.2 31.5 - 36.5 g/dL    RDW 13.6 10.0 - 15.0 %    Platelet Count 259 150 - 450 10e9/L    Diff Method Automated Method     % Neutrophils 77.0 %    % Lymphocytes 10.3 %    % Monocytes 10.2 %    % Eosinophils 2.2 %    % Basophils 0.1 %    % Immature Granulocytes 0.2 %    Nucleated RBCs 0 0 /100    Absolute Neutrophil 9.6 (H) 1.6 - 8.3 10e9/L    Absolute Lymphocytes 1.3 0.8 - 5.3 10e9/L    Absolute Monocytes 1.3 0.0 - 1.3 10e9/L    Absolute Eosinophils 0.3 0.0 - 0.7 10e9/L    Absolute  Basophils 0.0 0.0 - 0.2 10e9/L    Abs Immature Granulocytes 0.0 0 - 0.4 10e9/L    Absolute Nucleated RBC 0.0    Basic metabolic panel   Result Value Ref Range    Sodium 138 133 - 144 mmol/L    Potassium 4.3 3.4 - 5.3 mmol/L    Chloride 105 94 - 109 mmol/L    Carbon Dioxide 28 20 - 32 mmol/L    Anion Gap 5 3 - 14 mmol/L    Glucose 107 (H) 70 - 99 mg/dL    Urea Nitrogen 21 7 - 30 mg/dL    Creatinine 1.23 0.66 - 1.25 mg/dL    GFR Estimate 53 (L) >60 mL/min/[1.73_m2]    GFR Estimate If Black 61 >60 mL/min/[1.73_m2]    Calcium 8.6 8.5 - 10.1 mg/dL   Lactic acid   Result Value Ref Range    Lactic Acid 0.9 0.4 - 2.0 mmol/L   INR   Result Value Ref Range    INR 2.46 (H) 0.86 - 1.14   Blood culture   Result Value Ref Range    Specimen Description Blood Left Arm     Special Requests Aerobic and anaerobic bottles received     Culture Micro No growth after 1 hour    Blood culture   Result Value Ref Range    Specimen Description Blood Left Arm     Special Requests Aerobic and anaerobic bottles received     Culture Micro No growth after 1 hour    Cell count with differential fluid   Result Value Ref Range    Body Fluid Analysis Source Right Knee     % Neutrophils Fluid 96 %    % Lymphocytes Fluid 3 %    % Mono/Macro Fluid 1 %    Color Fluid Yellow     Appearance Fluid Cloudy     WBC Fluid 27205 /uL   Protein fluid   Result Value Ref Range    Protein Total Fluid Source Right Knee     Protein Total Fluid 2.6 g/dL   Gram stain   Result Value Ref Range    Specimen Description      Gram Stain No organisms seen     Gram Stain       Gram stain result is preliminary and awaits review of Microbiology Staff.    Gram Stain       Preliminary Gram stain report called to and read back by  [facility] AT 1442.SO     Crystal ID Synovial Fluid   Result Value Ref Range    Synovial Fluid Source Right Knee     Crystal Analysis No clincally significant crystals seen.  NOCRYS^No clincally significant crystals seen.   Glucose fluid   Result Value Ref  Range    Glucose Fluid Source Right Knee     Glucose Fluid 6 mg/dL          Attestation:  I have reviewed today's vital signs, notes, medications, labs and imaging with Dr. Srivastava.  Amount of time performed on this consult: 30 minutes.    Alisha Zelaya PA-C

## 2019-05-13 NOTE — PHARMACY-ADMISSION MEDICATION HISTORY
Admission medication history interview status for the 5/13/2019  admission is complete. See EPIC admission navigator for prior to admission medications     Medication history source reliability:Moderate    Actions taken by pharmacist (provider contacted, etc):  Spoke w/ patient.  Reviewed recent fill history through [facility].       Additional medication history information not noted on PTA med list :   - Patient states that he is taking Metoprolol Succinate 100 mg PO daily; however, prescription written for 50 mg PO daily.  Patient states that he does not cut any of his medications in half.     - Of note patient has not filled Metoprolol Succinate since 12/31/2018 which was filled for a 90 day supply.      Medication reconciliation/reorder completed by provider prior to medication history? No    Time spent in this activity: 20 minutes    Prior to Admission medications    Medication Sig Last Dose Taking? Auth Provider   albuterol (PROAIR HFA/PROVENTIL HFA/VENTOLIN HFA) 108 (90 Base) MCG/ACT Inhaler Inhale 1-2 puffs into the lungs every 6 hours as needed for shortness of breath / dyspnea or wheezing  at prn Yes Karson Bishop MD   atorvastatin (LIPITOR) 10 MG tablet Take 1 tablet (10 mg) by mouth daily 5/12/2019 at Unknown time Yes Tab Grijalva MD   camphor-menthol (DERMASARRA) 0.5-0.5 % LOTN Apply a thin layer to itchy areas as often as desired  at prn Yes En Aviles MD   doxepin (SINEQUAN) 50 MG capsule Take 50 mg by mouth daily 5/12/2019 at Unknown time Yes Unknown, Entered By History   ferrous sulfate (FEROSUL) 325 (65 Fe) MG tablet Take 1 tablet (325 mg) by mouth every other day 5/12/2019 at Unknown time Yes Nelson Mao APRN CNP   gabapentin (NEURONTIN) 300 MG capsule Take 300 mg by mouth 2 times daily 5/12/2019 at pm Yes Unknown, Entered By History   HYDROcodone-acetaminophen (NORCO) 5-325 MG tablet Take 1 tablet by mouth every 6 hours as needed for moderate to severe pain  at prn Yes  Olena Hernandez, PA-C   hydrOXYzine (ATARAX) 25 MG tablet Take 50 mg by mouth nightly as needed (sleep)  at prn Yes Unknown, Entered By History   loratadine (CLARITIN) 10 MG tablet Take 10 mg by mouth daily as needed for allergies   at prn Yes Reported, Patient   metoprolol succinate (TOPROL-XL) 100 MG 24 hr tablet Take 0.5 tablets (50 mg) by mouth daily Past Month at Unknown time Yes Echo Pineda,    pantoprazole (PROTONIX) 40 MG EC tablet Take 40 mg by mouth daily 5/12/2019 at Unknown time Yes Unknown, Entered By History   ranitidine (ZANTAC) 150 MG tablet Take 150 mg by mouth daily 5/12/2019 at Unknown time Yes Unknown, Entered By History   tiotropium (SPIRIVA HANDIHALER) 18 MCG capsule Inhale 1 capsule (18 mcg) into the lungs daily 5/12/2019 at Unknown time Yes Karson Bishop MD   triamcinolone (KENALOG) 0.1 % cream Apply topically 2 times daily as needed for irritation  at prn Yes Unknown, Entered By History   warfarin (COUMADIN) 5 MG tablet 7.5 mg Saturday  5 mg AOD 5/12/2019 at pm Yes Unknown, Entered By History     Brenda Aviles, PharmD, BCPS

## 2019-05-13 NOTE — ED NOTES
"[facility]  ED Nurse Handoff Report    ED Chief complaint: Hand Pain (right hand pain and swelling - since friday after cutting metal - ) and Knee Pain (right knee pain and swelling since friday)      ED Diagnosis:   Final diagnoses:   Cellulitis of hand, right   Effusion of right knee       Code Status: Full Code    Allergies:   Allergies   Allergen Reactions     Lidocaine Blisters     Allergy to lidocaine ointment     Omeprazole Itching     Pantoprazole Itching     Prevacid [Lansoprazole] Itching     Lasix [Furosemide] Rash     Penicillins Rash     \"broke out from injection\" 60 yrs ago         Activity level - Baseline/Home:  Independent    Activity Level - Current:   Independent     Needed?: No    Isolation: No  Infection: Not Applicable  Bariatric?: No    Vital Signs:   Vitals:    05/13/19 1022   BP: 99/53   Pulse: 102   Resp: 16   Temp: 98.8  F (37.1  C)   TempSrc: Oral   SpO2: 99%   Weight: 81.6 kg (180 lb)   Height: 1.778 m (5' 10\")       Cardiac Rhythm: ,        Pain level: 0-10 Pain Scale: 8    Is this patient confused?: No   Does this patient have a guardian?  No         If yes, is there guardianship documents in the Epic \"Code/ACP\" activity?  N/A         Guardian Notified?  N/A  [place] - Suicide Severity Rating Scale Completed?  Yes  If yes, what color did the patient score?  White    Patient Report: Initial Complaint: Redness and swelling of Right hand and arm  Focused Assessment: Pt has been working at home cutting metal pipes and cleaning out gutters. Pt noticed redness swelling and very painful. Pt was started on IV antibiotics and fluids.   Tests Performed: imaging labs  Abnormal Results: see results  Treatments provided: IV fluids and antibiotics    Family Comments: son in law at bedside but no additional information.     OBS brochure/video discussed/provided to patient/family: N/A              Name of person given brochure if not patient: na              Relationship " to patient: na    ED Medications:   Medications   ceFAZolin (ANCEF) intermittent infusion 2 g in 100 mL dextrose PRE-MIX (2 g Intravenous Given 5/13/19 1150)   0.9% sodium chloride BOLUS (0 mLs Intravenous Stopped 5/13/19 1253)       Drips infusing?:  No    For the majority of the shift this patient was Green.   Interventions performed were none.    Severe Sepsis OR Septic Shock Diagnosis Present: No    To be done/followed up on inpatient unit:  continue to monitor infection for redness. Treat with antibiotics and fluids    ED NURSE PHONE NUMBER: [phone]

## 2019-05-14 LAB
CRP SERPL-MCNC: 182 MG/L (ref 0–8)
ERYTHROCYTE [DISTWIDTH] IN BLOOD BY AUTOMATED COUNT: 13.8 % (ref 10–15)
ERYTHROCYTE [SEDIMENTATION RATE] IN BLOOD BY WESTERGREN METHOD: 68 MM/H (ref 0–20)
HCT VFR BLD AUTO: 33.7 % (ref 40–53)
HGB BLD-MCNC: 10.8 G/DL (ref 13.3–17.7)
INR PPP: 2.42 (ref 0.86–1.14)
MCH RBC QN AUTO: 27.6 PG (ref 26.5–33)
MCHC RBC AUTO-ENTMCNC: 32 G/DL (ref 31.5–36.5)
MCV RBC AUTO: 86 FL (ref 78–100)
PLATELET # BLD AUTO: 248 10E9/L (ref 150–450)
RBC # BLD AUTO: 3.92 10E12/L (ref 4.4–5.9)
WBC # BLD AUTO: 10.8 10E9/L (ref 4–11)

## 2019-05-14 PROCEDURE — 25000128 H RX IP 250 OP 636: Performed by: PHYSICIAN ASSISTANT

## 2019-05-14 PROCEDURE — 36415 COLL VENOUS BLD VENIPUNCTURE: CPT | Performed by: INTERNAL MEDICINE

## 2019-05-14 PROCEDURE — 25800030 ZZH RX IP 258 OP 636: Performed by: INTERNAL MEDICINE

## 2019-05-14 PROCEDURE — 12000000 ZZH R&B MED SURG/OB

## 2019-05-14 PROCEDURE — 85652 RBC SED RATE AUTOMATED: CPT | Performed by: PHYSICIAN ASSISTANT

## 2019-05-14 PROCEDURE — 85610 PROTHROMBIN TIME: CPT | Performed by: PHYSICIAN ASSISTANT

## 2019-05-14 PROCEDURE — 25000132 ZZH RX MED GY IP 250 OP 250 PS 637: Performed by: INTERNAL MEDICINE

## 2019-05-14 PROCEDURE — 85027 COMPLETE CBC AUTOMATED: CPT | Performed by: INTERNAL MEDICINE

## 2019-05-14 PROCEDURE — 86140 C-REACTIVE PROTEIN: CPT | Performed by: PHYSICIAN ASSISTANT

## 2019-05-14 PROCEDURE — 25000125 ZZHC RX 250: Performed by: PHYSICIAN ASSISTANT

## 2019-05-14 PROCEDURE — 94640 AIRWAY INHALATION TREATMENT: CPT

## 2019-05-14 PROCEDURE — 40000275 ZZH STATISTIC RCP TIME EA 10 MIN

## 2019-05-14 PROCEDURE — 25000128 H RX IP 250 OP 636: Performed by: INTERNAL MEDICINE

## 2019-05-14 PROCEDURE — 36415 COLL VENOUS BLD VENIPUNCTURE: CPT | Performed by: PHYSICIAN ASSISTANT

## 2019-05-14 PROCEDURE — 99232 SBSQ HOSP IP/OBS MODERATE 35: CPT | Performed by: PHYSICIAN ASSISTANT

## 2019-05-14 RX ORDER — IPRATROPIUM BROMIDE AND ALBUTEROL SULFATE 2.5; .5 MG/3ML; MG/3ML
3 SOLUTION RESPIRATORY (INHALATION) ONCE
Status: COMPLETED | OUTPATIENT
Start: 2019-05-14 | End: 2019-05-14

## 2019-05-14 RX ORDER — CEFAZOLIN SODIUM 1 G/50ML
1250 SOLUTION INTRAVENOUS EVERY 24 HOURS
Status: DISCONTINUED | OUTPATIENT
Start: 2019-05-14 | End: 2019-05-15

## 2019-05-14 RX ORDER — ALBUTEROL SULFATE 0.83 MG/ML
2.5 SOLUTION RESPIRATORY (INHALATION)
Status: DISCONTINUED | OUTPATIENT
Start: 2019-05-14 | End: 2019-05-17 | Stop reason: HOSPADM

## 2019-05-14 RX ADMIN — PANTOPRAZOLE SODIUM 40 MG: 40 TABLET, DELAYED RELEASE ORAL at 09:40

## 2019-05-14 RX ADMIN — ACETAMINOPHEN 650 MG: 325 TABLET, FILM COATED ORAL at 18:53

## 2019-05-14 RX ADMIN — METOPROLOL SUCCINATE 50 MG: 50 TABLET, EXTENDED RELEASE ORAL at 09:40

## 2019-05-14 RX ADMIN — CEFAZOLIN 1 G: 1 INJECTION, POWDER, FOR SOLUTION INTRAMUSCULAR; INTRAVENOUS at 12:22

## 2019-05-14 RX ADMIN — ATORVASTATIN CALCIUM 10 MG: 10 TABLET, FILM COATED ORAL at 09:39

## 2019-05-14 RX ADMIN — DOXEPIN HYDROCHLORIDE 50 MG: 50 CAPSULE ORAL at 09:39

## 2019-05-14 RX ADMIN — RANITIDINE 150 MG: 150 TABLET ORAL at 09:40

## 2019-05-14 RX ADMIN — GABAPENTIN 300 MG: 300 CAPSULE ORAL at 09:39

## 2019-05-14 RX ADMIN — VANCOMYCIN HYDROCHLORIDE 1250 MG: 5 INJECTION, POWDER, LYOPHILIZED, FOR SOLUTION INTRAVENOUS at 15:04

## 2019-05-14 RX ADMIN — CEFAZOLIN 1 G: 1 INJECTION, POWDER, FOR SOLUTION INTRAMUSCULAR; INTRAVENOUS at 03:58

## 2019-05-14 RX ADMIN — ACETAMINOPHEN 650 MG: 325 TABLET, FILM COATED ORAL at 23:58

## 2019-05-14 RX ADMIN — TIOTROPIUM BROMIDE 18 MCG: 18 CAPSULE ORAL; RESPIRATORY (INHALATION) at 09:50

## 2019-05-14 RX ADMIN — GABAPENTIN 300 MG: 300 CAPSULE ORAL at 21:01

## 2019-05-14 RX ADMIN — SODIUM CHLORIDE 1000 ML: 9 INJECTION, SOLUTION INTRAVENOUS at 20:19

## 2019-05-14 RX ADMIN — IPRATROPIUM BROMIDE AND ALBUTEROL SULFATE 3 ML: .5; 3 SOLUTION RESPIRATORY (INHALATION) at 18:03

## 2019-05-14 RX ADMIN — OXYCODONE HYDROCHLORIDE 5 MG: 5 TABLET ORAL at 09:49

## 2019-05-14 ASSESSMENT — ACTIVITIES OF DAILY LIVING (ADL)
ADLS_ACUITY_SCORE: 16
ADLS_ACUITY_SCORE: 15
ADLS_ACUITY_SCORE: 14
ADLS_ACUITY_SCORE: 16

## 2019-05-14 NOTE — PHARMACY-VANCOMYCIN DOSING SERVICE
Pharmacy Vancomycin Initial Note  Date of Service May 14, 2019  Patient's  11/3/1932  86 year old, male  ABW = 81.6 kg    Indication: R hand cellulitis    Current estimated CrCl = Estimated Creatinine Clearance: 49.8 mL/min (based on SCr of 1.23 mg/dL).    Creatinine for last 3 days  2019: 11:28 AM Creatinine 1.23 mg/dL    Recent Vancomycin Level(s) for last 3 days  No results found for requested labs within last 72 hours.      Vancomycin IV Administrations (past 72 hours)      No vancomycin orders with administrations in past 72 hours.                Nephrotoxins and other renal medications (From now, onward)    Start     Dose/Rate Route Frequency Ordered Stop    19 1500  vancomycin (VANCOCIN) 1,250 mg in sodium chloride 0.9 % 250 mL intermittent infusion      1,250 mg  over 90 Minutes Intravenous EVERY 24 HOURS 19 1423            Contrast Orders - past 72 hours (72h ago, onward)    None                Plan:  1.  Start vancomycin  1250 mg IV q24h.   2.  Goal Trough Level: 10-15 mg/L   3.  Pharmacy will check trough levels as appropriate in 1-3 Days or sooner if SCr worsens.  4. Serum creatinine levels will be ordered a minimum of twice weekly.    5. Welch method utilized to dose vancomycin therapy: Method 1    Garima Carrasquillo, EmeliD

## 2019-05-14 NOTE — PROVIDER NOTIFICATION
MD Notification    Notified Person: JESÚS    Notified Person Name: Alisha Kellerkadenlorenbarbara JESÚS    Notification Date/Time: 5/14/2019 at 1115    Notification Interaction: Phone call    Purpose of Notification: CRP, sed rate, and INR results are available for viewing and are abnormal.    Orders Received: Per Alisha, will continue to trend these labs.    Comments:

## 2019-05-14 NOTE — PROVIDER NOTIFICATION
MD Notification    Notified Person: JESÚS    Notified Person Name: Zeenat CansecoJESÚS rainey    Notification Date/Time: 5/14/2019 at 1359    Notification Interaction: Web page    Purpose of Notification: O2 increased from 1L to 2L via NC. O2 sat 87-91%. LS diminished with expiratory wheezing at times. Do you want to order PRN neb? Hx COPD.    Orders Received: See order for one time dose neb tx.    Comments:

## 2019-05-14 NOTE — PROVIDER NOTIFICATION
MD Notification    Notified Person: JESÚS    Notified Person Name: Joanna Barthell, JESÚS    Notification Date/Time: 5/14/2019 at 1745    Notification Interaction: Web page    Purpose of Notification: Please place order for supplemental O2 with parameters.    Orders Received:    Comments:

## 2019-05-14 NOTE — PROGRESS NOTES
[facility]  Hospitalist Progress Note    Assessment & Plan   Hermilo Velasquez is a 86 year old male with a history of non-Hodgkins lymphoma, COPD, chronic PE, and pAF who presented with right hand swelling/erythema/pain found to have cellulitis concerning for infectious tenosynovitis.     Sepsis 2/2 to right hand cellulitis.  Accidentally cut himself with tin cutters on some metal at the 2nd webspace on the right 3 days PTA.  Progressive swelling/erythema/pain to right index finger extending up in to the hand and forearm. SIRS criteria with leukocytosis and tachycardiac on adm; lactate wnl and no evidence organ dysfunction to suggest severe sepsis. CRP and ESR elevated 182 and 68 respectively. Last tdap 2012.  - Monitor demarcated skin.  - Blood ctx 5/13 NGTD.  - Ancef changed to vancomycin for MRSA coverage per ortho request, abx initiated 5/13.  - Appreciate Orthopedic surgery consultation -- at present no surgical plans and continue to monitor, WBAT RUE and RLE, elevate RUE when in bed.  - NPO at midnight.     Chronic right knee pain/osteoarthritis.  Knee pain x 50 years, worse over wknd. Last steroid injection approx 6 months ago. Aspirated in ED, does not suggest infection or crystalopathy. Clinically does not appear infected.  - Fluid ctx 5/13 NGTD.  - Consider cortisone injection when infection fully resolved.  - APAP PRN and ice.     Paroxysmal atrial fibrillation. Believe in setting AVR 2015. No hx CVA.  Hx PE (2016) on chronic anticoagulation.  Severe AS s/p bioprosthetic AV (2015).  Rate controlled.  - Continue PTA metoprolol.  - Hold PTA coumadin in event of surgical intervention.  - INR 2.42.     COPD. PFTs 3/2019 indicated moderate airflow obstruction mixed asthmatic/copd. Former 2 pack per day smoker for many years, quit in 1990s. Denies SOB/LAROSE, not on O2 at baseline. Mild diffuse exp wheezing.  - Continue PTA Tiotropium.  - PRN albuterol nebs available.     Non-Hodgkins lymphoma.  No issues currently.  - Follow as outpatient.    Diet: Regular Diet Adult  Suazo Catheter: not present    DVT Prophylaxis: Pneumatic Compression Devices  Code Status: Full Code    This patient was discussed with Dr. Stevenson of the Hospitalist Service who agrees with current plans as outlined above.    Disposition: Expected discharge in >2 days pending ortho recs and ctx results.    JoAnna K. Barthell, PA-C    Interval History   Pain controlled. Redness reportedly improved compared to yesterday.    -Data reviewed today: I reviewed all new labs and imaging results over the last 24 hours. I personally reviewed no images or EKG's today.    Physical Exam   Temp: 98.7  F (37.1  C) Temp src: Oral BP: 112/59 Pulse: 81 Heart Rate: 89 Resp: 16 SpO2: 95 % O2 Device: Nasal cannula Oxygen Delivery: 1 LPM  Vitals:    05/13/19 1022   Weight: 81.6 kg (180 lb)   Constitutional: Appears stated age, no acute distress.  Respiratory: Breath sounds mild diffuse expiratory wheezing. No increased work of breathing on 1L NC.  Cardiovascular: RRR, no rub or murmur. No peripheral edema.  GI: Soft, non-tender, non-distended. Bowel sounds present.  Skin: Warm, dry. Right hand moderately swollen and erythematous, mild TTP. Right index with marked arthritis deformity, ROM intact. Erythema demarcated. Right knee mildly swollen and tender; no erythema.    Medications       atorvastatin  10 mg Oral Daily     ceFAZolin  1 g Intravenous Q8H     doxepin  50 mg Oral Daily     gabapentin  300 mg Oral BID     metoprolol succinate ER  50 mg Oral Daily     pantoprazole  40 mg Oral Daily     ranitidine  150 mg Oral Daily     tiotropium  18 mcg Inhalation Daily       Data   Recent Labs   Lab 05/14/19  0605 05/13/19  1128 05/09/19   WBC 10.8 12.4*  --    HGB 10.8* 12.1*  --    MCV 86 86  --     259  --    INR  --  2.46* 3.3*   NA  --  138  --    POTASSIUM  --  4.3  --    CHLORIDE  --  105  --    CO2  --  28  --    BUN  --  21  --    CR  --  1.23  --     ANIONGAP  --  5  --    AMRITA  --  8.6  --    GLC  --  107*  --        Imaging:  Recent Results (from the past 24 hour(s))   XR Hand Right G/E 3 Views    Narrative    XR HAND RT G/E 3 VW 5/13/2019 11:43 AM    HISTORY: Possible metallic foreign body in the thumb or index finger.  Cellulitis.    COMPARISON: None.    FINDINGS: No radiopaque foreign body. Chronic arthropathy throughout  the hand and radial aspect of the wrist. Second finger soft tissue  swelling. No focal cortical erosion to specifically suggest  osteomyelitis.      Impression    IMPRESSION: No radiopaque foreign body.    URSULA BRANHAM MD   XR Knee Right 3 Views    Narrative    XR KNEE RT 3 VW 5/13/2019 11:43 AM    HISTORY: Pain, joint effusion.    COMPARISON: 7/28/2018    FINDINGS: Prominent joint effusion on the lateral view. Moderate  degenerative changes characterized by loss of joint space and moderate  marginal osteophyte formation. Chondrocalcinosis in the tibiofemoral  compartment. Patchy vascular calcification.      Impression    IMPRESSION: Degenerative change. No acute abnormality.    URSULA BRANHAM MD

## 2019-05-14 NOTE — PLAN OF CARE
A&Ox1. Disoriented to time, place, and situation at times. Unclear if patient is [place]. Garbled/incoherent speech at times, may be due to dentures not fitting properly. VSS on 2L O2 via. O2 sat 88-94%. C/o R leg pain. Given PRN oxycodone x 1 and Tylenol with decrease in pain. RUE with edema, redness (unchanged), warmth. CMS intact otherwise. RUE elevated with pillows. Continues on IV abx. LS diminished with slight expiratory wheezing. Given one time neb tx. No complaints of SOB. Up with assist of 1. Did not get OOB with nursing staff this shift, patient refused. Sat at edge of bed. Voiding. Regular diet, poor appetite. Needs encouragement to eat. RLE with mild edema, elevated with pillows. IV SL. Nursing will continue to monitor.

## 2019-05-14 NOTE — PROGRESS NOTES
Orthopedic Surgery  Hermilo Velasquez  2019  Admit Date:  2019  Right hand cellulitis  Right knee osteoarthritis    Alert and orient to person, place, and time.  Patient resting comfortably in bed with arm down at side.   Reports slight improvement in pain within right hand.   Tolerating oral intake.    Denies nausea or vomiting  Denies chest pain or shortness of breath    Vital Sign Ranges  Temperature Temp  Av.7  F (37.1  C)  Min: 98.4  F (36.9  C)  Max: 99  F (37.2  C)   Blood pressure Systolic (24hrs), Av , Min:99 , Max:139        Diastolic (24hrs), Av, Min:47, Max:69      Pulse Pulse  Av.1  Min: 67  Max: 102   Respirations Resp  Av.4  Min: 16  Max: 20   Pulse oximetry SpO2  Av %  Min: 92 %  Max: 99 %       On physical exam the patient's right hand, there is a slight decrease of the global erythema and swelling of the right index finger and along the dorsal aspect of hand and medial wrist.  Joint deformities noted from arthritic change at the DIP and PIP of the index finger and long finger PIP joint.  He has decreased pain with passive wrist flexion and extension.  Patient remains tender to palpation along the extensor tendon of the index finger, however less pain with passive flexion and extension vs yesterday.  There is no tenderness along the flexor tendon of the index finger.  No pain with thumb flexion or extension.  Distal pulses are intact and equal bilaterally.  Capillary refill is less than 2 seconds.     On physical exam the patient's right knee, he continues to have a moderate joint effusion present.  There is no erythema noted.  He denies pain with passive flexion and extension of his knee.  Denies pain with hip rotation.  Patient is neurovascularly intact in both lower extremities with distal pulses present.    Labs:  Recent Labs   Lab Test 19  1128 19  1319 19  0757   POTASSIUM 4.3 4.9 4.2     Recent Labs   Lab Test 19  0605  05/13/19  1128 03/27/19  1319   HGB 10.8* 12.1* 11.2*     Recent Labs   Lab Test 05/13/19  1128 05/09/19 04/17/19   INR 2.46* 3.3* 3.1*     Recent Labs   Lab Test 05/14/19  0605 05/13/19  1128 03/29/19  0720    259 178     WBC:  12.4 ---> 10.8  Knee aspirate:  No crystals, Cell count [id number], Gram Stain:  No orgs    A/P  1. PLAN:   Recommend continue to monitor right hand without surgical intervention as it is slightly improved today.    Will hold coumadin again overnight pending possible I&D.     Will again have patient be NPO effective midnight   ESR and CRP ordered   WBAT RUE and RLE.     Continue current pain regiment.   Order placed to continue right UE elevation when in bed   Recheck hand in am.          In regard to the right knee, this does not appear to be a septic joint.         Will continue to monitor culture results.         Out-patient cortisone injection would likely be beneficial once hand infection has resolved.         Ice right knee as needed    2. Disposition   Anticipate d/c to home when medically cleared and progressing in PT.    Alisha Zelaya PA-C

## 2019-05-14 NOTE — PLAN OF CARE
Pt's (R) hand/lower arm is hot and swollen that is marked. (R) knee is sensitive and mildly swollen. Pain managed with oxycodone. Up and stood at edge of the bed with quite difficulty. Po intake and voiding (urinal) fair amt. Will be NPO after midnight. Remains afebrile. Will cont to monitor.

## 2019-05-15 LAB
APPEARANCE FLD: NORMAL
COLOR FLD: YELLOW
CREAT SERPL-MCNC: 1 MG/DL (ref 0.66–1.25)
CRP SERPL-MCNC: 184 MG/L (ref 0–8)
ERYTHROCYTE [SEDIMENTATION RATE] IN BLOOD BY WESTERGREN METHOD: 83 MM/H (ref 0–20)
GFR SERPL CREATININE-BSD FRML MDRD: 68 ML/MIN/{1.73_M2}
INR PPP: 2.72 (ref 0.86–1.14)
LYMPHOCYTES NFR FLD MANUAL: 5 %
MONOS+MACROS NFR FLD MANUAL: 9 %
NEUTS BAND NFR FLD MANUAL: 86 %
SPECIMEN SOURCE FLD: NORMAL
WBC # FLD AUTO: 5770 /UL

## 2019-05-15 PROCEDURE — 85652 RBC SED RATE AUTOMATED: CPT | Performed by: PHYSICIAN ASSISTANT

## 2019-05-15 PROCEDURE — 99207 ZZC CDG-MDM COMPONENT: MEETS LOW - DOWN CODED: CPT | Performed by: INTERNAL MEDICINE

## 2019-05-15 PROCEDURE — 25000132 ZZH RX MED GY IP 250 OP 250 PS 637: Performed by: INTERNAL MEDICINE

## 2019-05-15 PROCEDURE — 89051 BODY FLUID CELL COUNT: CPT | Performed by: PHYSICIAN ASSISTANT

## 2019-05-15 PROCEDURE — 82565 ASSAY OF CREATININE: CPT | Performed by: PHYSICIAN ASSISTANT

## 2019-05-15 PROCEDURE — 36415 COLL VENOUS BLD VENIPUNCTURE: CPT | Performed by: PHYSICIAN ASSISTANT

## 2019-05-15 PROCEDURE — 0S9C3ZZ DRAINAGE OF RIGHT KNEE JOINT, PERCUTANEOUS APPROACH: ICD-10-PCS | Performed by: PHYSICIAN ASSISTANT

## 2019-05-15 PROCEDURE — 87070 CULTURE OTHR SPECIMN AEROBIC: CPT | Performed by: PHYSICIAN ASSISTANT

## 2019-05-15 PROCEDURE — 12000000 ZZH R&B MED SURG/OB

## 2019-05-15 PROCEDURE — 86140 C-REACTIVE PROTEIN: CPT | Performed by: PHYSICIAN ASSISTANT

## 2019-05-15 PROCEDURE — 25000128 H RX IP 250 OP 636: Performed by: INTERNAL MEDICINE

## 2019-05-15 PROCEDURE — 99232 SBSQ HOSP IP/OBS MODERATE 35: CPT | Performed by: INTERNAL MEDICINE

## 2019-05-15 PROCEDURE — 85610 PROTHROMBIN TIME: CPT | Performed by: PHYSICIAN ASSISTANT

## 2019-05-15 RX ORDER — CEFAZOLIN SODIUM 2 G/100ML
2 INJECTION, SOLUTION INTRAVENOUS EVERY 8 HOURS
Status: DISCONTINUED | OUTPATIENT
Start: 2019-05-15 | End: 2019-05-17

## 2019-05-15 RX ORDER — BUPIVACAINE HYDROCHLORIDE 2.5 MG/ML
5 INJECTION, SOLUTION INFILTRATION; PERINEURAL ONCE
Status: DISCONTINUED | OUTPATIENT
Start: 2019-05-15 | End: 2019-05-17 | Stop reason: HOSPADM

## 2019-05-15 RX ADMIN — PANTOPRAZOLE SODIUM 40 MG: 40 TABLET, DELAYED RELEASE ORAL at 08:39

## 2019-05-15 RX ADMIN — ACETAMINOPHEN 650 MG: 325 TABLET, FILM COATED ORAL at 04:22

## 2019-05-15 RX ADMIN — ACETAMINOPHEN 650 MG: 325 TABLET, FILM COATED ORAL at 08:40

## 2019-05-15 RX ADMIN — ATORVASTATIN CALCIUM 10 MG: 10 TABLET, FILM COATED ORAL at 08:39

## 2019-05-15 RX ADMIN — DOXEPIN HYDROCHLORIDE 50 MG: 50 CAPSULE ORAL at 08:39

## 2019-05-15 RX ADMIN — GABAPENTIN 300 MG: 300 CAPSULE ORAL at 08:40

## 2019-05-15 RX ADMIN — TIOTROPIUM BROMIDE 18 MCG: 18 CAPSULE ORAL; RESPIRATORY (INHALATION) at 08:44

## 2019-05-15 RX ADMIN — RANITIDINE 150 MG: 150 TABLET ORAL at 08:40

## 2019-05-15 RX ADMIN — CEFAZOLIN SODIUM 2 G: 2 INJECTION, SOLUTION INTRAVENOUS at 16:36

## 2019-05-15 RX ADMIN — GABAPENTIN 300 MG: 300 CAPSULE ORAL at 20:58

## 2019-05-15 ASSESSMENT — ACTIVITIES OF DAILY LIVING (ADL)
ADLS_ACUITY_SCORE: 15
ADLS_ACUITY_SCORE: 16

## 2019-05-15 NOTE — PHARMACY-ANTICOAGULATION SERVICE
Clinical Pharmacy - Warfarin Dosing Consult     Pharmacy has been consulted to manage this patient s warfarin therapy.  Indication: Atrial Fibrillation  Therapy Goal: INR 2-3  Warfarin Prior to Admission: Yes  Warfarin PTA Regimen: 7.5 mg Sat: 5 mg AOD  Recent documented change in oral intake/nutrition: Unknown    INR   Date Value Ref Range Status   05/15/2019 2.72 (H) 0.86 - 1.14 Final   05/14/2019 2.42 (H) 0.86 - 1.14 Final       Recommend warfarin 0 mg today.  Pharmacy will monitor Hermilo MADRID Eva daily and order warfarin doses to achieve specified goal.      Please contact pharmacy as soon as possible if the warfarin needs to be held for a procedure or if the warfarin goals change.

## 2019-05-15 NOTE — PROGRESS NOTES
Orthopedic Surgery  Hermilo Velasquez  5/15/2019  Admit Date:  2019  Right hand cellulitis  Right knee effusion with osteoarthritis    Answering questions appropriately this am  Patient resting comfortably in bed.    Pain improving in right hand.  Increasing pain in right knee with motion and WB.    Tolerating oral intake.    Denies nausea or vomiting  Denies chest pain or shortness of breath    Vital Sign Ranges  Temperature Temp  Av  F (36.7  C)  Min: 97.5  F (36.4  C)  Max: 98.3  F (36.8  C)   Blood pressure Systolic (24hrs), Av , Min:81 , Max:115        Diastolic (24hrs), Av, Min:42, Max:59      Pulse No data recorded   Respirations Resp  Av.5  Min: 16  Max: 18   Pulse oximetry SpO2  Av.6 %  Min: 89 %  Max: 94 %       On physical exam the patient's right hand, decreasing global erythema and swelling of the right index finger and along the dorsal aspect of hand and medial wrist.  Joint deformities noted from arthritic change at the DIP and PIP of the index finger and long finger PIP joint.  He has decreased pain with passive wrist flexion and extension.  Patient has decreased tenderness along the extensor tendon of the index finger and denies pain with passive motion this am.  There is no tenderness along the flexor tendon of the index finger.  No pain with thumb flexion or extension.  Distal pulses are intact and equal bilaterally.  Capillary refill is less than 2 seconds.     On physical exam the patient's right knee, larger joint effusion present today.  There is no erythema noted.  He denies pain with passive flexion and extension of his knee but motion is limited due to effusion.  Denies pain with hip rotation.  Patient is neurovascularly intact in both lower extremities with distal pulses present.          Labs:  Recent Labs   Lab Test 19  1128 19  1319 19  0757   POTASSIUM 4.3 4.9 4.2     Recent Labs   Lab Test 19  0605 19  1128 19  1319    HGB 10.8* 12.1* 11.2*     Recent Labs   Lab Test 05/14/19  1021 05/13/19  1128 05/09/19   INR 2.42* 2.46* 3.3*     Recent Labs   Lab Test 05/14/19  0605 05/13/19  1128 03/29/19  0720    259 178     Knee aspirate:  No growth to date, no crystals, cell count [id number]  ESR:  68--->83  CRP:  182 ---> 184    A/P  1. PLAN:   Continue Vancomycin    Hand is improving daily.  Much improved vs 2 days ago.  Not recommending I&D   Unsure of source for increasing ESR, CRP   WBAT RUE and RLE   Elevation of RUE     Right knee does not appear septic, no growth on cultures   Will discuss re-aspirate with trauma surgeon today to relieve pressure and pain in knee.  This is likely due to osteoarthritis not infection   Out-patient cortisone injection once discharge and infection resolved   Ice right knee as needed    2. Disposition   Continue cares.     Alisha Zelaya PA-C

## 2019-05-15 NOTE — PROGRESS NOTES
"Orthopedics:    Following sterile skin prep with Betadine and obtaining the patient's consent, patient's right knee was injected with 3 ml of 0.25% Marcaine.  A 20-gauge needle was introduced and 85 cc of clear yellow synovial fluid was aspirated.  No complications with the procedure.  The fluid will be sent to the lab for repeat cultures.    The right knee was then wrapped with a 6\" ace. Able to remove per patient request.     Alisha Zelaya PA-C on 5/15/2019 at 12:44 PM      "

## 2019-05-15 NOTE — PLAN OF CARE
A/Ox3-4, can be forgetful at times. Soft BPs, 1L fluid bolus given. Other VSS on 2L per nasal cannula. Redness blanchable, +2 edema, and pain to RUE, borders improving. R knee pain, +2 edema, no discoloration. Keeping RUE and RLE elevated. Baseline neuropathy BLE. Tylenol PRN given for pain. Up with 2A/gb/walker to bedside commode, needs encouragement to get out of bed. L PIV S/L. NPO since 0000. Tele ordered for low BPs, NSR with occasional PVCs. CRP, sed rate, INR elevated. Plan pending. Continue to monitor.

## 2019-05-15 NOTE — PROGRESS NOTES
Cross cover:    Paged regarding hypotension.  BP of 81/51 and 84/48.    --Telemetry due to history of paroxysmal atrial fib.    --IVF bolus 1 L over 2 hour.    --Hold parameters placed for Toprol-XL.  --Monitor.      Allan Bullock PA-C

## 2019-05-15 NOTE — PROGRESS NOTES
[facility]  Hospitalist Progress Note    Assessment & Plan   Hermilo Velasquez is a 86 year old male with a history of non-Hodgkins lymphoma, COPD, chronic PE, and pAF who presented with right hand swelling/erythema/pain found to have cellulitis concerning for infectious tenosynovitis.     Sepsis 2/2 to right hand cellulitis.  Accidentally cut himself with tin cutters on some metal at the 2nd webspace on the right 3 days PTA.  Progressive swelling/erythema/pain to right index finger extending up in to the hand and forearm. SIRS criteria with leukocytosis and tachycardiac on adm; lactate wnl and no evidence organ dysfunction to suggest severe sepsis. CRP and ESR elevated 182 and 68 respectively. Last tdap 2012.  - Monitor demarcated skin.cellulitis much improved from yesterday   - Blood ctx 5/13 NGTD.  - stop vancomycin and start IV cefazolin today   No need for I&D per Ortho today      Chronic right knee pain/osteoarthritis.  Knee pain x 50 years, worse over wknd. Last steroid injection approx 6 months ago. Aspirated in ED, does not suggest infection or crystalopathy. Clinically does not appear infected.  - Fluid ctx 5/13 NGTD.  -fluid aspirated from right knee by Ortho today   - APAP PRN and ice.     Paroxysmal atrial fibrillation. Believe in setting AVR 2015. No hx CVA.  Hx PE (2016) on chronic anticoagulation.  Severe AS s/p bioprosthetic AV (2015).  Rate controlled.  - Continue PTA metoprolol.  Restart coumadin today as no surgical plans per ORtho   - INR 2.42.     COPD. PFTs 3/2019 indicated moderate airflow obstruction mixed asthmatic/copd. Former 2 pack per day smoker for many years, quit in 1990s. Denies SOB/LAROSE, not on O2 at baseline. Mild diffuse exp wheezing.  - Continue PTA Tiotropium.  - PRN albuterol nebs available.     Non-Hodgkins lymphoma. No issues currently.  - Follow as outpatient.    Diet: Regular Diet Adult  Suazo Catheter: not present    DVT Prophylaxis: Pneumatic Compression  Devices  Code Status: Full Code        Disposition: Expected discharge in 1-2 days     Deana Stevenson MD   Pager [phone]    Interval History   Pain controlled. Right hand cellulitis improving. Right knee effusion aspirated by Ortho today     -Data reviewed today: I reviewed all new labs and imaging results over the last 24 hours. I personally reviewed no images or EKG's today.    Physical Exam   Temp: 97.5  F (36.4  C) Temp src: Oral BP: 102/54   Heart Rate: 60 Resp: 16 SpO2: 94 % O2 Device: Nasal cannula Oxygen Delivery: 2 LPM  Vitals:    05/13/19 1022   Weight: 81.6 kg (180 lb)   Constitutional: Appears stated age, no acute distress.  Respiratory: Breath sounds CTA today . No increased work of breathing on 1L NC.  Cardiovascular: RRR, no rub or murmur. No peripheral edema.  GI: Soft, non-tender, non-distended. Bowel sounds present.  Skin: Warm, dry. Right hand cellulitis improving     Medications     Warfarin Therapy Reminder         atorvastatin  10 mg Oral Daily     bupivacaine  5 mL INTRA-ARTICULAR Once     ceFAZolin  2 g Intravenous Q8H     doxepin  50 mg Oral Daily     gabapentin  300 mg Oral BID     metoprolol succinate ER  50 mg Oral Daily     pantoprazole  40 mg Oral Daily     ranitidine  150 mg Oral Daily     tiotropium  18 mcg Inhalation Daily     warfarin-No DOSE today  1 each Does not apply no dose today (warfarin)       Data   Recent Labs   Lab 05/15/19  1312 05/15/19  0700 05/14/19  1021 05/14/19  0605 05/13/19  1128   WBC  --   --   --  10.8 12.4*   HGB  --   --   --  10.8* 12.1*   MCV  --   --   --  86 86   PLT  --   --   --  248 259   INR 2.72*  --  2.42*  --  2.46*   NA  --   --   --   --  138   POTASSIUM  --   --   --   --  4.3   CHLORIDE  --   --   --   --  105   CO2  --   --   --   --  28   BUN  --   --   --   --  21   CR  --  1.00  --   --  1.23   ANIONGAP  --   --   --   --  5   AMRITA  --   --   --   --  8.6   GLC  --   --   --   --  107*       Imaging:  No results found for this or any  previous visit (from the past 24 hour(s)).

## 2019-05-15 NOTE — PLAN OF CARE
A&OX4.  Turned and repositioned in bed.  Voiding using urinal.  Oxygen 93% on 2L via nasal cannula.  2+ edema to right hand/redness noted, elevated on pillows and swelling has improved throughout the day.  RIght knee 2+ edema, Ortho aspirated fluid from the right knee today and applied ace wrap.  Patient has Denied pain or has stated minimal pain since aspiration.  PRN tylenol given X1.  Tolerating regular diet.  Vanco discontinued , first dose of Ancef given.  Plan for PT consult tomorrow.

## 2019-05-15 NOTE — PROVIDER NOTIFICATION
MD Notification    Notified Person: PA    Notified Person Name: Rajinder Bullock    Notification Date/Time: 5/14/19 8:00 PM    Notification Interaction: Phone call    Purpose of Notification: BP 81/51    Orders Received: Telemetry, IVF    Comments: Thank you!

## 2019-05-16 ENCOUNTER — APPOINTMENT (OUTPATIENT)
Dept: PHYSICAL THERAPY | Facility: CLINIC | Age: 84
DRG: 872 | End: 2019-05-16
Attending: INTERNAL MEDICINE
Payer: COMMERCIAL

## 2019-05-16 LAB
CRP SERPL-MCNC: 127 MG/L (ref 0–8)
ERYTHROCYTE [DISTWIDTH] IN BLOOD BY AUTOMATED COUNT: 13.6 % (ref 10–15)
ERYTHROCYTE [SEDIMENTATION RATE] IN BLOOD BY WESTERGREN METHOD: 89 MM/H (ref 0–20)
HCT VFR BLD AUTO: 34.6 % (ref 40–53)
HGB BLD-MCNC: 11 G/DL (ref 13.3–17.7)
INR PPP: 2.2 (ref 0.86–1.14)
MCH RBC QN AUTO: 27.4 PG (ref 26.5–33)
MCHC RBC AUTO-ENTMCNC: 31.8 G/DL (ref 31.5–36.5)
MCV RBC AUTO: 86 FL (ref 78–100)
PLATELET # BLD AUTO: 266 10E9/L (ref 150–450)
RBC # BLD AUTO: 4.02 10E12/L (ref 4.4–5.9)
WBC # BLD AUTO: 9.2 10E9/L (ref 4–11)

## 2019-05-16 PROCEDURE — 85652 RBC SED RATE AUTOMATED: CPT | Performed by: PHYSICIAN ASSISTANT

## 2019-05-16 PROCEDURE — 99232 SBSQ HOSP IP/OBS MODERATE 35: CPT | Performed by: INTERNAL MEDICINE

## 2019-05-16 PROCEDURE — 85027 COMPLETE CBC AUTOMATED: CPT | Performed by: PHYSICIAN ASSISTANT

## 2019-05-16 PROCEDURE — 99207 ZZC CDG-MDM COMPONENT: MEETS LOW - DOWN CODED: CPT | Performed by: INTERNAL MEDICINE

## 2019-05-16 PROCEDURE — 25000132 ZZH RX MED GY IP 250 OP 250 PS 637: Performed by: INTERNAL MEDICINE

## 2019-05-16 PROCEDURE — 86140 C-REACTIVE PROTEIN: CPT | Performed by: PHYSICIAN ASSISTANT

## 2019-05-16 PROCEDURE — 97116 GAIT TRAINING THERAPY: CPT | Mod: GP

## 2019-05-16 PROCEDURE — 25000128 H RX IP 250 OP 636: Performed by: INTERNAL MEDICINE

## 2019-05-16 PROCEDURE — 25000132 ZZH RX MED GY IP 250 OP 250 PS 637: Performed by: PHYSICIAN ASSISTANT

## 2019-05-16 PROCEDURE — 85610 PROTHROMBIN TIME: CPT | Performed by: PHYSICIAN ASSISTANT

## 2019-05-16 PROCEDURE — 36415 COLL VENOUS BLD VENIPUNCTURE: CPT | Performed by: PHYSICIAN ASSISTANT

## 2019-05-16 PROCEDURE — 12000000 ZZH R&B MED SURG/OB

## 2019-05-16 PROCEDURE — 97161 PT EVAL LOW COMPLEX 20 MIN: CPT | Mod: GP

## 2019-05-16 PROCEDURE — 97530 THERAPEUTIC ACTIVITIES: CPT | Mod: GP

## 2019-05-16 RX ORDER — TRIAMCINOLONE ACETONIDE 1 MG/G
CREAM TOPICAL 2 TIMES DAILY PRN
Status: DISCONTINUED | OUTPATIENT
Start: 2019-05-16 | End: 2019-05-17 | Stop reason: HOSPADM

## 2019-05-16 RX ORDER — WARFARIN SODIUM 5 MG/1
5 TABLET ORAL
Status: COMPLETED | OUTPATIENT
Start: 2019-05-16 | End: 2019-05-16

## 2019-05-16 RX ADMIN — RANITIDINE 150 MG: 150 TABLET ORAL at 08:31

## 2019-05-16 RX ADMIN — METOPROLOL SUCCINATE 50 MG: 50 TABLET, EXTENDED RELEASE ORAL at 08:30

## 2019-05-16 RX ADMIN — GABAPENTIN 300 MG: 300 CAPSULE ORAL at 08:30

## 2019-05-16 RX ADMIN — ATORVASTATIN CALCIUM 10 MG: 10 TABLET, FILM COATED ORAL at 08:30

## 2019-05-16 RX ADMIN — TIOTROPIUM BROMIDE 18 MCG: 18 CAPSULE ORAL; RESPIRATORY (INHALATION) at 08:35

## 2019-05-16 RX ADMIN — GABAPENTIN 300 MG: 300 CAPSULE ORAL at 20:47

## 2019-05-16 RX ADMIN — CEFAZOLIN SODIUM 2 G: 2 INJECTION, SOLUTION INTRAVENOUS at 16:09

## 2019-05-16 RX ADMIN — ACETAMINOPHEN 650 MG: 325 TABLET, FILM COATED ORAL at 08:31

## 2019-05-16 RX ADMIN — PANTOPRAZOLE SODIUM 40 MG: 40 TABLET, DELAYED RELEASE ORAL at 08:30

## 2019-05-16 RX ADMIN — DOXEPIN HYDROCHLORIDE 50 MG: 50 CAPSULE ORAL at 08:30

## 2019-05-16 RX ADMIN — CEFAZOLIN SODIUM 2 G: 2 INJECTION, SOLUTION INTRAVENOUS at 08:30

## 2019-05-16 RX ADMIN — WARFARIN SODIUM 5 MG: 5 TABLET ORAL at 20:47

## 2019-05-16 RX ADMIN — CEFAZOLIN SODIUM 2 G: 2 INJECTION, SOLUTION INTRAVENOUS at 01:18

## 2019-05-16 ASSESSMENT — ACTIVITIES OF DAILY LIVING (ADL)
ADLS_ACUITY_SCORE: 15
ADLS_ACUITY_SCORE: 14
ADLS_ACUITY_SCORE: 13
ADLS_ACUITY_SCORE: 15

## 2019-05-16 NOTE — PROGRESS NOTES
"   05/16/19 0900   Quick Adds   Type of Visit Initial PT Evaluation   Living Environment   Lives With spouse   Living Arrangements house   Home Accessibility stairs to enter home   Number of Stairs, Main Entrance 1  (may be large enough to fit walker)   Stair Railings, Main Entrance railing on right side (ascending)  (pt reports he has a \"handicap handle\" installed on R)   Transportation Anticipated family or friend will provide   Living Environment Comment all needs met main level; bathroom set up includes tub shower- has shower chair   Self-Care   Usual Activity Tolerance good   Current Activity Tolerance moderate   Activity/Exercise/Self-Care Comment Pt reports he has an exercise bike he uses \"when I feel like it\"   Functional Level Prior   Ambulation 0-->independent   Transferring 0-->independent   Toileting 0-->independent   Bathing 1-->assistive equipment  (shower chair, grab bar)   Fall history within last six months no   General Information   Onset of Illness/Injury or Date of Surgery - Date 05/13/19   Referring Physician Deana Stevenson MD   Patient/Family Goals Statement to go home   Pertinent History of Current Problem (include personal factors and/or comorbidities that impact the POC)  Pt is 86 y.o. M admitted 5/13 with R hand cellulitis concerning for tenosynovitis, also with increased R knee pain- aspirated in ED and 5/15. PMH includes non Hodgkins lymphoma, COPD, chronic PE, and pAF.   Precautions/Limitations fall precautions;oxygen therapy device and L/min   Weight-Bearing Status - LUE full weight-bearing   Weight-Bearing Status - RUE weight-bearing as tolerated   Weight-Bearing Status - LLE full weight-bearing   Weight-Bearing Status - RLE weight-bearing as tolerated   General Info Comments Activity: Up ad delmy   Cognitive Status Examination   Orientation orientation to person, place and time   Level of Consciousness alert   Follows Commands and Answers Questions 100% of the time;able to follow " multistep instructions   Personal Safety and Judgment intact   Pain Assessment   Patient Currently in Pain Yes, see Vital Sign flowsheet  (4/10 R knee, denies hand/wrist pain)   Integumentary/Edema   Integumentary/Edema Comments Some R knee edema noted. R knee wrapped.    Posture    Posture Forward head position;Protracted shoulders   Posture Comments flexed posture   Range of Motion (ROM)   ROM Comment R Knee lacking full extension, R knee flexion appears at least 90 with transfers and sitting in chair. L LE ROM WFL   Strength   Strength Comments 4/5 B hip flexion, 4+/5 L knee extension, R knee extension not tested due to pain. 4+/5 B knee flexion, ankle PF/DF WFL   Bed Mobility   Bed Mobility Comments CGA supine>sit, flat bed with no rail   Transfer Skills   Transfer Comments Sit>stand CGA with FWW, uses momentum to propel into standing   Gait   Gait Comments Ambulated 10' with FWW and CGA, antalgic R with partial step-to pattern, short step length, increased reliance on UE support due to painful WB R   Balance   Balance Comments Good balance with UE support on FWW   Sensory Examination   Sensory Perception Comments Pt reports baseline neuropathy B feet/ankles   Modality Interventions   Planned Modality Interventions Cryotherapy   General Therapy Interventions   Planned Therapy Interventions balance training;bed mobility training;gait training;neuromuscular re-education;ROM;strengthening;stretching;transfer training;home program guidelines;progressive activity/exercise   Clinical Impression   Criteria for Skilled Therapeutic Intervention yes, treatment indicated   PT Diagnosis impaired gait   Influenced by the following impairments pain, decreased R knee extension, decreased strength   Functional limitations due to impairments difficulty with bed mobility, transfers, and ambulation   Clinical Presentation Stable/Uncomplicated   Clinical Presentation Rationale clinical judgement   Clinical Decision Making  "(Complexity) Low complexity   Therapy Frequency` daily   Predicted Duration of Therapy Intervention (days/wks) 3 days   Anticipated Equipment Needs at Discharge   (none, pt owns FWW)   Anticipated Discharge Disposition Home with Assist  (SBA of wife for step to enter home)   Risk & Benefits of therapy have been explained Yes   Patient, Family & other staff in agreement with plan of care Yes   Clinical Impression Comments Anticipate that patient will progress with continued therapy in order for safe discharge home.    [facility] [employer] TM \"6 Clicks\"   2016, Trustees of [facility], under license to [employer].  All rights reserved.   6 Clicks Short Forms Basic Mobility Inpatient Short Form   [facility] AM-PAC  \"6 Clicks\" V.2 Basic Mobility Inpatient Short Form   1. Turning from your back to your side while in a flat bed without using bedrails? 4 - None   2. Moving from lying on your back to sitting on the side of a flat bed without using bedrails? 3 - A Little   3. Moving to and from a bed to a chair (including a wheelchair)? 3 - A Little   4. Standing up from a chair using your arms (e.g., wheelchair, or bedside chair)? 3 - A Little   5. To walk in hospital room? 3 - A Little   6. Climbing 3-5 steps with a railing? 3 - A Little   Basic Mobility Raw Score (Score out of 24.Lower scores equate to lower levels of function) 19     "

## 2019-05-16 NOTE — PROGRESS NOTES
[facility]  Hospitalist Progress Note    Assessment & Plan   Hermilo Velasquez is a 86 year old male with a history of non-Hodgkins lymphoma, COPD, chronic PE, and pAF who presented with right hand swelling/erythema/pain found to have cellulitis concerning for infectious tenosynovitis.     Sepsis 2/2 to right hand cellulitis.  Accidentally cut himself with tin cutters on some metal at the 2nd webspace on the right 3 days PTA.  Progressive swelling/erythema/pain to right index finger extending up in to the hand and forearm. SIRS criteria with leukocytosis and tachycardiac on adm; lactate wnl and no evidence organ dysfunction to suggest severe sepsis. CRP and ESR elevated 182 and 68 respectively. Last tdap 2012.  - Monitor demarcated skin.cellulitis much improved from yesterday   - Blood ctx 5/13 NGTD.  - stop vancomycin and started IV cefazolin on 5/15    No need for I&D per Ortho      Chronic right knee pain/osteoarthritis.  Knee pain x 50 years, worse over wknd. Last steroid injection approx 6 months ago. Aspirated in ED, does not suggest infection or crystalopathy. Clinically does not appear infected.  - Fluid ctx 5/13 NGTD.  -fluid aspirated from right knee by Ortho on 5/15. Able to ambulate on the knee today   - APAP PRN and ice.     Paroxysmal atrial fibrillation. Believe in setting AVR 2015. No hx CVA.  Hx PE (2016) on chronic anticoagulation.  Severe AS s/p bioprosthetic AV (2015).  Rate controlled.  - Continue PTA metoprolol.  Restarted coumadin     COPD. PFTs 3/2019 indicated moderate airflow obstruction mixed asthmatic/copd. Former 2 pack per day smoker for many years, quit in 1990s. Denies SOB/LAROSE, not on O2 at baseline. Mild diffuse exp wheezing.  - Continue PTA Tiotropium.  - PRN albuterol nebs available.     Non-Hodgkins lymphoma. No issues currently.  - Follow as outpatient.    Diet: Regular Diet Adult  Suazo Catheter: not present    DVT Prophylaxis: Pneumatic Compression  Devices  Code Status: Full Code        Disposition: Expected discharge tomorrow   Deana Stevenson MD   Pager [phone]    Interval History   Pain controlled. Right hand cellulitis improving. Right knee effusion much improved and able to ambulate with PT today   -Data reviewed today: I reviewed all new labs and imaging results over the last 24 hours. I personally reviewed no images or EKG's today.    Physical Exam   Temp: 98.1  F (36.7  C) Temp src: Oral BP: 103/61 Pulse: 115 Heart Rate: 82 Resp: 16 SpO2: 93 % O2 Device: Nasal cannula Oxygen Delivery: 1 LPM  Vitals:    05/13/19 1022   Weight: 81.6 kg (180 lb)   Constitutional: Appears stated age, no acute distress.  Respiratory: Breath sounds CTA today . No increased work of breathing on 1L NC.  Cardiovascular: RRR, no rub or murmur. No peripheral edema.  GI: Soft, non-tender, non-distended. Bowel sounds present.  Skin: Warm, dry. Right hand cellulitis improving     Medications     Warfarin Therapy Reminder         atorvastatin  10 mg Oral Daily     bupivacaine  5 mL INTRA-ARTICULAR Once     ceFAZolin  2 g Intravenous Q8H     doxepin  50 mg Oral Daily     gabapentin  300 mg Oral BID     metoprolol succinate ER  50 mg Oral Daily     pantoprazole  40 mg Oral Daily     ranitidine  150 mg Oral Daily     tiotropium  18 mcg Inhalation Daily     warfarin  5 mg Oral ONCE at 18:00       Data   Recent Labs   Lab 05/16/19  0641 05/15/19  1312 05/15/19  0700 05/14/19  1021 05/14/19  0605 05/13/19  1128   WBC 9.2  --   --   --  10.8 12.4*   HGB 11.0*  --   --   --  10.8* 12.1*   MCV 86  --   --   --  86 86     --   --   --  248 259   INR 2.20* 2.72*  --  2.42*  --  2.46*   NA  --   --   --   --   --  138   POTASSIUM  --   --   --   --   --  4.3   CHLORIDE  --   --   --   --   --  105   CO2  --   --   --   --   --  28   BUN  --   --   --   --   --  21   CR  --   --  1.00  --   --  1.23   ANIONGAP  --   --   --   --   --  5   AMRITA  --   --   --   --   --  8.6   GLC  --   --    --   --   --  107*       Imaging:  No results found for this or any previous visit (from the past 24 hour(s)).

## 2019-05-16 NOTE — PROGRESS NOTES
Orthopedic Surgery  Hermilo Velasquez  2019  Admit Date:  2019  Right hand cellulites and right knee osteoarthritis with effusion    Alert and orient to person, place, and time.  Patient resting comfortably in chair.  States right hand and knee pain improved. Able to walk more comfortably.   Pain controlled.  Tolerating oral intake.    Denies nausea or vomiting  Remains on O2 cannula    Vital Sign Ranges  Temperature Temp  Av.8  F (36.6  C)  Min: 97.4  F (36.3  C)  Max: 98.1  F (36.7  C)   Blood pressure Systolic (24hrs), Av , Min:108 , Max:127        Diastolic (24hrs), Av, Min:61, Max:71      Pulse Pulse  Av.5  Min: 73  Max: 115   Respirations Resp  Av  Min: 16  Max: 16   Pulse oximetry SpO2  Av.8 %  Min: 90 %  Max: 95 %       On physical exam the patient's right hand, continues decrease of global erythema and swelling of the right index finger and along the dorsal aspect of hand and medial wrist.  Joint deformities noted from arthritic change at the DIP and PIP of the index finger and long finger PIP joint.  He has decreased pain with passive wrist flexion and extension.  Patient has decreased tenderness along the extensor tendon of the index finger and denies pain with passive motion this am.  Improved AROM of fingers. There is no tenderness along the flexor tendon of the index finger.  No pain with thumb flexion or extension.  Distal pulses are intact and equal bilaterally.  Capillary refill is less than 2 seconds.     On physical exam the patient's right knee, joint effusion decreased today.  There is no erythema noted.  He denies pain with passive flexion and extension of his knee but motion is limited due to effusion.  Denies pain with hip rotation.  Patient is neurovascularly intact in both lower extremities with distal pulses present.          Labs:  Recent Labs   Lab Test 19  1128 19  1319 19  0757   POTASSIUM 4.3 4.9 4.2     Recent Labs   Lab Test  05/16/19  0641 05/14/19  0605 05/13/19  1128   HGB 11.0* 10.8* 12.1*     Recent Labs   Lab Test 05/16/19  0641 05/15/19  1312 05/14/19  1021   INR 2.20* 2.72* 2.42*     Recent Labs   Lab Test 05/16/19  0641 05/14/19  0605 05/13/19  1128    248 259     Knee aspirate from 5/15:  Cell count 5770  ESR:  68--->83--->89  CRP:  182--->184--->127    A/P  1. PLAN:   Continue recommended antibiotics per hospitalist              Hand is improving daily.  Much improved vs 3 days ago.  Not recommending surgical intervention              WBAT RUE and RLE              Elevation of RUE                 Right knee does not appear septic, no growth on cultures              Out-patient cortisone injection once discharge and infection resolved and off antibiotics (2-3 weeks)              Ice right knee as needed    2. Disposition   Anticipate d/c when medically cleared and progressing in PT.  Ok from ortho standpoint.     Alisha Zelaya PA-C

## 2019-05-16 NOTE — PLAN OF CARE
"Discharge Planner PT   Patient plan for discharge: home  Current status: PT orders received, eval completed, treatment initiated. Pt is 86 y.o. M admitted 5/13 with R hand cellulitis concerning for tenosynovitis, also with increased R knee pain- aspirated in ED and 5/15. PMH includes non Hodgkins lymphoma, COPD, chronic PE, and pAF. Patient lives with wife in house with 1 step to enter. Pt previously independent, does own FWW and SEC but does not use. Bathroom set up includes tub shower with grab bar and shower chair- pt does use.     Currently, pt rec supine in bed, states he is expecting his lunch to arrive soon but is agreeable to PT until lunch arrives. Pt on 1L O2 initially with SpO2 noted to be 87; no shortness of breath noted and patient removes NC with SpO2 remaining at 87. Supine>sit CGA from flat bed without rail. Sit>stand with FWW and CGA, pt uses momentum to proprel into standing. Ambulated 160' with FWW and CGA-SBA; antalgic R with short step length and partial step-through pattern initially, progressing to step through pattern with normal step length. Increased reliance on UE support with ambulation due to painful R knee. Stand>sit at WC with FWW and CGA, cues for arm placement. Some shortness of breath noted with ambulation, SpO2 82 seated post gait on RA, recovering to 90 within 30\" with 1 L O2 applied. Pt states his baseline O2 sat at home is around  upper 80s-90 at rest, 85 after ambulating. Initiated LE exercises for strengthening. Pt seated in WC in room eating lunch upon departure of PT.  Barriers to return to prior living situation: Current level of assist, stairs- not yet assessed.   Recommendations for discharge: Home   Rationale for recommendations: Anticipate that patient will progress with continued therapy in order for safe discharge home.        Entered by: Rubina Wolf 05/16/2019 9:41 AM       "

## 2019-05-16 NOTE — PLAN OF CARE
A/Ox4. Up Ax2 with Gaitbelt and walker. Voiding adequately per urinal. VSS on 2L O2. Tele. CMS intact with baseline neuropathy. Minimal swelling and redness to RUE. R knee swollen. Denies pain. ACE wrap in place around R knee. Progressing per POC. Will cont to monitor.

## 2019-05-17 ENCOUNTER — APPOINTMENT (OUTPATIENT)
Dept: PHYSICAL THERAPY | Facility: CLINIC | Age: 84
DRG: 872 | End: 2019-05-17
Payer: COMMERCIAL

## 2019-05-17 VITALS
HEIGHT: 70 IN | RESPIRATION RATE: 16 BRPM | WEIGHT: 180 LBS | HEART RATE: 115 BPM | TEMPERATURE: 98.5 F | SYSTOLIC BLOOD PRESSURE: 109 MMHG | OXYGEN SATURATION: 91 % | DIASTOLIC BLOOD PRESSURE: 78 MMHG | BODY MASS INDEX: 25.77 KG/M2

## 2019-05-17 LAB — INR PPP: 1.6 (ref 0.86–1.14)

## 2019-05-17 PROCEDURE — 25000132 ZZH RX MED GY IP 250 OP 250 PS 637: Performed by: PHYSICIAN ASSISTANT

## 2019-05-17 PROCEDURE — 85610 PROTHROMBIN TIME: CPT | Performed by: INTERNAL MEDICINE

## 2019-05-17 PROCEDURE — 97116 GAIT TRAINING THERAPY: CPT | Mod: GP

## 2019-05-17 PROCEDURE — 36415 COLL VENOUS BLD VENIPUNCTURE: CPT | Performed by: INTERNAL MEDICINE

## 2019-05-17 PROCEDURE — 97530 THERAPEUTIC ACTIVITIES: CPT | Mod: GP

## 2019-05-17 PROCEDURE — 25000128 H RX IP 250 OP 636: Performed by: INTERNAL MEDICINE

## 2019-05-17 PROCEDURE — 25000132 ZZH RX MED GY IP 250 OP 250 PS 637: Performed by: INTERNAL MEDICINE

## 2019-05-17 PROCEDURE — 99239 HOSP IP/OBS DSCHRG MGMT >30: CPT | Performed by: INTERNAL MEDICINE

## 2019-05-17 RX ORDER — CEPHALEXIN 500 MG/1
500 CAPSULE ORAL EVERY 6 HOURS SCHEDULED
Status: DISCONTINUED | OUTPATIENT
Start: 2019-05-17 | End: 2019-05-17 | Stop reason: HOSPADM

## 2019-05-17 RX ORDER — CEPHALEXIN 500 MG/1
500 CAPSULE ORAL EVERY 6 HOURS
Qty: 28 CAPSULE | Refills: 0 | Status: SHIPPED | OUTPATIENT
Start: 2019-05-17 | End: 2019-08-28

## 2019-05-17 RX ADMIN — CEFAZOLIN SODIUM 2 G: 2 INJECTION, SOLUTION INTRAVENOUS at 00:11

## 2019-05-17 RX ADMIN — PANTOPRAZOLE SODIUM 40 MG: 40 TABLET, DELAYED RELEASE ORAL at 09:00

## 2019-05-17 RX ADMIN — CEPHALEXIN 500 MG: 500 CAPSULE ORAL at 11:51

## 2019-05-17 RX ADMIN — TIOTROPIUM BROMIDE 18 MCG: 18 CAPSULE ORAL; RESPIRATORY (INHALATION) at 09:07

## 2019-05-17 RX ADMIN — RANITIDINE 150 MG: 150 TABLET ORAL at 09:00

## 2019-05-17 RX ADMIN — ATORVASTATIN CALCIUM 10 MG: 10 TABLET, FILM COATED ORAL at 09:00

## 2019-05-17 RX ADMIN — GABAPENTIN 300 MG: 300 CAPSULE ORAL at 09:01

## 2019-05-17 RX ADMIN — METOPROLOL SUCCINATE 50 MG: 50 TABLET, EXTENDED RELEASE ORAL at 09:00

## 2019-05-17 RX ADMIN — CEFAZOLIN SODIUM 2 G: 2 INJECTION, SOLUTION INTRAVENOUS at 09:01

## 2019-05-17 RX ADMIN — DOXEPIN HYDROCHLORIDE 50 MG: 50 CAPSULE ORAL at 09:00

## 2019-05-17 ASSESSMENT — ACTIVITIES OF DAILY LIVING (ADL)
ADLS_ACUITY_SCORE: 14
ADLS_ACUITY_SCORE: 14
ADLS_ACUITY_SCORE: 15
ADLS_ACUITY_SCORE: 14

## 2019-05-17 NOTE — DISCHARGE SUMMARY
[facility]  Discharge Summary        Hermilo Velasquez MRN# [id number]   YOB: 1932 Age: 86 year old     Date of Admission:  5/13/2019  Date of Discharge:  5/17/2019  Admitting Physician:  Jose Miguel Rodríguez DO  Discharge Physician: Deana Stevenson MD  Discharging Service: Hospitalist     Primary Provider: Karson Bishop  Primary Care Physician Phone Number: [phone]         Discharge Diagnoses/Problem Oriented Hospital Course (Providers):    Hermilo Velasquez was admitted on 5/13/2019 by Jose Miguel Rodríguez DO and I would refer you to their history and physical.  The following problems were addressed during his hospitalization:  Assessment & Plan     Hermilo Velasquez is a 86 year old male with a history of non-Hodgkins lymphoma, COPD, chronic PE, and pAF who presented with right hand swelling/erythema/pain found to have cellulitis concerning for infectious tenosynovitis.     Sepsis 2/2 to right hand cellulitis.  Accidentally cut himself with tin cutters on some metal at the 2nd webspace on the right 3 days PTA.  Progressive swelling/erythema/pain to right index finger extending up in to the hand and forearm. SIRS criteria with leukocytosis and tachycardiac on adm; lactate wnl and no evidence organ dysfunction to suggest severe sepsis. CRP and ESR elevated 182 and 68 respectively. Last tdap 2012.  - Monitor demarcated skin.cellulitis much improved from yesterday   - Blood ctx 5/13 NGTD.  - stop vancomycin and started IV cefazolin on 5/15    No need for I&D per Ortho   Will discharge with oral keflex      Chronic right knee pain/osteoarthritis.  Knee pain x 50 years, worse over wknd. Last steroid injection approx 6 months ago. Aspirated in ED, does not suggest infection or crystalopathy. Clinically does not appear infected.  - Fluid ctx 5/13 NGTD.  -fluid aspirated from right knee by Ortho on 5/15. Able to ambulate on the knee today   - APAP PRN and ice.   knee fluid  cultures remain negative   Paroxysmal atrial fibrillation. Believe in setting AVR 2015. No hx CVA.  Hx PE (2016) on chronic anticoagulation.  Severe AS s/p bioprosthetic AV (2015).  Rate controlled.  - Continue PTA metoprolol.  Restarted coumadin      COPD. PFTs 3/2019 indicated moderate airflow obstruction mixed asthmatic/copd. Former 2 pack per day smoker for many years, quit in 1990s. Denies SOB/LAROSE, not on O2 at baseline. Mild diffuse exp wheezing.  - Continue PTA Tiotropium.  - PRN albuterol nebs available.     Non-Hodgkins lymphoma. No issues currently.  - Follow as outpatient.     Diet: Regular Diet Adult  Suazo Catheter: not present     DVT Prophylaxis: Pneumatic Compression Devices  Code Status: Full Code           Disposition home today   Deana Stevenson MD   Pager [phone]                 Code Status:      Full Code        Brief Hospital Stay Summary Sent Home With Patient in AVS:        Reason for your hospital stay      Right hand infection and Right knee pain                 Important Results:      See below         Pending Results:        Unresulted Labs Ordered in the Past 30 Days of this Admission     Date and Time Order Name Status Description    5/15/2019 1242 Fluid Culture Aerobic Bacterial Preliminary     5/13/2019 1254 Fluid Culture Aerobic Bacterial Preliminary     5/13/2019 1100 Blood culture Preliminary     5/13/2019 1100 Blood culture Preliminary             Discharge Instructions and Follow-Up:      Follow-up Appointments     Follow-up and recommended labs and tests       Follow up with primary care provider, Karson Bishop, within 7 days for   hospital follow- up. INR recheck on Monday               Discharge Disposition:      Discharged to home        Discharge Medications:        Current Discharge Medication List      START taking these medications    Details   cephALEXin (KEFLEX) 500 MG capsule Take 1 capsule (500 mg) by mouth every 6 hours for 7 days  Qty: 28 capsule, Refills: 0     Associated Diagnoses: Cellulitis of right upper extremity         CONTINUE these medications which have NOT CHANGED    Details   albuterol (PROAIR HFA/PROVENTIL HFA/VENTOLIN HFA) 108 (90 Base) MCG/ACT Inhaler Inhale 1-2 puffs into the lungs every 6 hours as needed for shortness of breath / dyspnea or wheezing  Qty: 3 Inhaler, Refills: 5    Comments: PLEASE DISPENSE PROAIR HFA, this is preferred by insurance  Associated Diagnoses: Chronic obstructive pulmonary disease, unspecified COPD type (H)      atorvastatin (LIPITOR) 10 MG tablet Take 1 tablet (10 mg) by mouth daily  Qty: 90 tablet, Refills: 0    Comments: MD visit required for further refills  Associated Diagnoses: Mixed hyperlipidemia      camphor-menthol (DERMASARRA) 0.5-0.5 % LOTN Apply a thin layer to itchy areas as often as desired  Qty: 444 mL, Refills: 11    Associated Diagnoses: Pruritus      doxepin (SINEQUAN) 50 MG capsule Take 50 mg by mouth daily      ferrous sulfate (FEROSUL) 325 (65 Fe) MG tablet Take 1 tablet (325 mg) by mouth every other day  Qty: 60 tablet, Refills: 3    Comments: Dose change  Associated Diagnoses: Iron deficiency anemia, unspecified iron deficiency anemia type      gabapentin (NEURONTIN) 300 MG capsule Take 300 mg by mouth 2 times daily      HYDROcodone-acetaminophen (NORCO) 5-325 MG tablet Take 1 tablet by mouth every 6 hours as needed for moderate to severe pain  Qty: 28 tablet, Refills: 0    Associated Diagnoses: Acute post-operative pain; Nodule of lower lobe of right lung      hydrOXYzine (ATARAX) 25 MG tablet Take 50 mg by mouth nightly as needed (sleep)      loratadine (CLARITIN) 10 MG tablet Take 10 mg by mouth daily as needed for allergies       metoprolol succinate (TOPROL-XL) 100 MG 24 hr tablet Take 0.5 tablets (50 mg) by mouth daily  Qty: 90 tablet, Refills: 3    Associated Diagnoses: Benign essential hypertension      pantoprazole (PROTONIX) 40 MG EC tablet Take 40 mg by mouth daily      ranitidine (ZANTAC) 150  "MG tablet Take 150 mg by mouth daily      tiotropium (SPIRIVA HANDIHALER) 18 MCG capsule Inhale 1 capsule (18 mcg) into the lungs daily  Qty: 90 capsule, Refills: 3    Associated Diagnoses: Chronic obstructive pulmonary disease, unspecified COPD type (H)      triamcinolone (KENALOG) 0.1 % cream Apply topically 2 times daily as needed for irritation      warfarin (COUMADIN) 5 MG tablet 7.5 mg Saturday  5 mg AOD               Allergies:         Allergies   Allergen Reactions     Lidocaine Blisters     Allergy to lidocaine ointment     Omeprazole Itching     Pantoprazole Itching     Prevacid [Lansoprazole] Itching     Lasix [Furosemide] Rash     Penicillins Rash     \"broke out from injection\" 60 yrs ago             Consultations This Hospital Stay:      Consultation during this admission received from orthopedics        Condition and Physical on Discharge:      Discharge condition: Stable   Vitals: Blood pressure 109/78, pulse 115, temperature 98.5  F (36.9  C), temperature source Oral, resp. rate 16, height 1.778 m (5' 10\"), weight 81.6 kg (180 lb), SpO2 91 %.     Constitutional: Alert and awake    Lungs: CTA   Cardiovascular: RRR   Abdomen: Soft, NT, ND, BS+   Skin: RIght hand cellulitis improved    Other:          Discharge Time:      Greater than 30 minutes.        Image Results From This Hospital Stay (For Non-EPIC Providers):        Results for orders placed or performed during the hospital encounter of 05/13/19   XR Hand Right G/E 3 Views    Narrative    XR HAND RT G/E 3 VW 5/13/2019 11:43 AM    HISTORY: Possible metallic foreign body in the thumb or index finger.  Cellulitis.    COMPARISON: None.    FINDINGS: No radiopaque foreign body. Chronic arthropathy throughout  the hand and radial aspect of the wrist. Second finger soft tissue  swelling. No focal cortical erosion to specifically suggest  osteomyelitis.      Impression    IMPRESSION: No radiopaque foreign body.    URSULA BRANHAM MD   XR Knee Right 3 Views "    Narrative    XR KNEE RT 3 VW 5/13/2019 11:43 AM    HISTORY: Pain, joint effusion.    COMPARISON: 7/28/2018    FINDINGS: Prominent joint effusion on the lateral view. Moderate  degenerative changes characterized by loss of joint space and moderate  marginal osteophyte formation. Chondrocalcinosis in the tibiofemoral  compartment. Patchy vascular calcification.      Impression    IMPRESSION: Degenerative change. No acute abnormality.    URSULA BRANHAM MD             Most Recent Lab Results In EPIC (For Non-EPIC Providers):    Most Recent 3 CBC's:  Recent Labs   Lab Test 05/16/19  0641 05/14/19  0605 05/13/19  1128   WBC 9.2 10.8 12.4*   HGB 11.0* 10.8* 12.1*   MCV 86 86 86    248 259      Most Recent 3 BMP's:  Recent Labs   Lab Test 05/15/19  0700 05/13/19  1128 03/29/19  0720 03/27/19  1319 03/26/19  0757   NA  --  138  --  140 143   POTASSIUM  --  4.3  --  4.9 4.2   CHLORIDE  --  105  --  109 111*   CO2  --  28  --  27 26   BUN  --  21  --  25 23   CR 1.00 1.23 1.05 1.06 1.08   ANIONGAP  --  5  --  4 6   AMRITA  --  8.6  --  8.4* 9.1   GLC  --  107*  --  112* 97     Most Recent 3 Troponin's:  Recent Labs   Lab Test 07/23/18  1715 02/10/16  1042 02/09/16  2300   TROPI <0.015 0.054* 0.061*     Most Recent 3 INR's:  Recent Labs   Lab Test 05/17/19  0734 05/16/19  0641 05/15/19  1312   INR 1.60* 2.20* 2.72*     Most Recent 2 LFT's:  Recent Labs   Lab Test 03/01/19  0928 07/31/18  1104   AST 20 11   ALT 15 14   ALKPHOS 91 65   BILITOTAL 0.2 0.3     Most Recent Cholesterol Panel:  Recent Labs   Lab Test 02/13/17  0956   CHOL 126   LDL 69   HDL 44   TRIG 67     Most Recent 6 Bacteria Isolates From Any Culture (See EPIC Reports for Culture Details):  Recent Labs   Lab Test 05/15/19  1237 05/13/19  1250 05/13/19  1128 05/13/19  1106 11/24/15  1043 11/24/15  1036   CULT Culture negative monitoring continues Culture negative monitoring continues No growth after 4 days No growth after 4 days No growth No growth     Most  Recent TSH, T4 and HgbA1c:   Recent Labs   Lab Test 07/31/18  1104 02/21/18  1124   TSH 2.88 2.38   T4  --  1.21   A1C 5.8*  --

## 2019-05-17 NOTE — PLAN OF CARE
A&OX4. VSS. Rtele- SR with PVCs, but sinus tachy with PVCs at times. Oxygen stat dropped down to 86% at bedtime. 92% with 2L O2. Numbness present with baseline neuropathy. Denies any pain. R hand- redness blanchable- stayed within marked border. R Knee swelling improving, dressing CDI. Continue to monitor.

## 2019-05-17 NOTE — PLAN OF CARE
"Pt ding well today, up with SBA of 1 and walker.  Pt states that his baseline CMS \"I have neuropothy!\"  Gave pt his discharge instructions and pt is anxious to go home.  Medication reviewed and medication given to pt.  Pt understands how to take his medicines.  Pt's wife came in and picked pt up.  Pt has met all of his goals.    "

## 2019-05-17 NOTE — PLAN OF CARE
A&OX4.  Up with 1 and walker.  Voiding using the urinal.  Right hand has improved, trace edema and still some redness noted.  Ace-wrap to right knee, edema has improved from yesterday after they aspirated fluid.  Patient denies pain.  PRN tylenol given X1.  Plan to discharge tomorrow.  Oxygen 88% on room air, this is baseline for patient and he denies SOB.

## 2019-05-17 NOTE — PROGRESS NOTES
Orthopedic Surgery  Hermilo Velasquez  2019  Admit Date:  2019  Right hand cellulitis   Right knee osteoarthritis with effusion      Alert and orient to person, place, and time.  Patient resting comfortably in chair.    Pain continues to improve  Tolerating oral intake.    Denies nausea or vomiting  Denies chest pain or shortness of breath:  Remains on O2 cannula    Vital Sign Ranges  Temperature Temp  Av  F (36.7  C)  Min: 97.7  F (36.5  C)  Max: 98.5  F (36.9  C)   Blood pressure Systolic (24hrs), Av , Min:103 , Max:121        Diastolic (24hrs), Av, Min:61, Max:80      Pulse No data recorded   Respirations Resp  Av  Min: 16  Max: 16   Pulse oximetry SpO2  Av.8 %  Min: 86 %  Max: 93 %       On physical exam the patient's right hand, much improved.  Patient grabbing grapes in a cup this am with nearly full return of motion.  continued decrease of global erythema and swelling of the right index finger and along the dorsal aspect of hand and medial wrist.  Joint deformities noted from arthritic change at the DIP and PIP of the index finger and long finger PIP joint.    Patient has denies tenderness along the extensor tendon of the index finger and denies pain with passive motion.  Improved AROM of fingers. There is no tenderness along the flexor tendon of the index finger.  No pain with thumb flexion or extension.  Distal pulses are intact and equal bilaterally.  Capillary refill is less than 2 seconds.     On physical exam the patient's right knee, joint effusion decreased today.  There is no erythema noted.  He denies pain with passive flexion and extension of his knee but motion is limited due to effusion.  Denies pain with hip rotation.  Patient is neurovascularly intact in both lower extremities with distal pulses present.          Labs:  Recent Labs   Lab Test 19  1128 19  1319 19  0757   POTASSIUM 4.3 4.9 4.2     Recent Labs   Lab Test 19  0641  05/14/19  0605 05/13/19  1128   HGB 11.0* 10.8* 12.1*     Recent Labs   Lab Test 05/17/19  0734 05/16/19  0641 05/15/19  1312   INR 1.60* 2.20* 2.72*     Recent Labs   Lab Test 05/16/19  0641 05/14/19  0605 05/13/19  1128    248 259       A/P  1. PLAN:   Continue recommended antibiotics per hospitalist              WBAT RUE and RLE              Elevation of RUE                 Right knee does not appear septic, no growth on cultures              Out-patient cortisone injection once discharge and infection resolved and off antibiotics (2-3 weeks)              Ice and ace wrap right knee as needed    2. Disposition   Anticipate d/c to home today if medically cleared and progressing in PT.    Alisha Zelaya PA-C

## 2019-05-18 LAB
BACTERIA SPEC CULT: NO GROWTH
SPECIMEN SOURCE: NORMAL

## 2019-05-19 LAB
BACTERIA SPEC CULT: NO GROWTH
BACTERIA SPEC CULT: NO GROWTH
Lab: NORMAL
Lab: NORMAL
SPECIMEN SOURCE: NORMAL
SPECIMEN SOURCE: NORMAL

## 2019-05-20 ENCOUNTER — TELEPHONE (OUTPATIENT)
Dept: FAMILY MEDICINE | Facility: CLINIC | Age: 84
End: 2019-05-20

## 2019-05-20 ENCOUNTER — ANTICOAGULATION THERAPY VISIT (OUTPATIENT)
Dept: NURSING | Facility: CLINIC | Age: 84
End: 2019-05-20
Payer: COMMERCIAL

## 2019-05-20 DIAGNOSIS — I48.0 PAROXYSMAL ATRIAL FIBRILLATION (H): ICD-10-CM

## 2019-05-20 DIAGNOSIS — I26.99 PULMONARY EMBOLISM, BILATERAL (H): ICD-10-CM

## 2019-05-20 LAB
BACTERIA SPEC CULT: NO GROWTH
INR POINT OF CARE: 2.6 (ref 0.86–1.14)
SPECIMEN SOURCE: NORMAL

## 2019-05-20 PROCEDURE — 85610 PROTHROMBIN TIME: CPT | Mod: QW

## 2019-05-20 PROCEDURE — 99207 ZZC NO CHARGE NURSE ONLY: CPT

## 2019-05-20 PROCEDURE — 36416 COLLJ CAPILLARY BLOOD SPEC: CPT

## 2019-05-20 NOTE — PROGRESS NOTES
ANTICOAGULATION FOLLOW-UP CLINIC VISIT    Patient Name:  Hermilo Velasquez  Date:  2019  Contact Type:  Face to Face    SUBJECTIVE:  Patient Findings     Positives:   Emergency department visit (was in ER for cellulitis -on Keflex for this)        Clinical Outcomes     Negatives:   Major bleeding event, Thromboembolic event, Anticoagulation-related hospital admission, Anticoagulation-related ED visit, Anticoagulation-related fatality           OBJECTIVE    INR Protime   Date Value Ref Range Status   2019 2.6 (A) 0.86 - 1.14 Final       ASSESSMENT / PLAN  INR assessment THER    Recheck INR In: 2 WEEKS    INR Location Clinic      Anticoagulation Summary  As of 2019    INR goal:   2.0-3.0   TTR:   48.1 % (3.1 y)   INR used for dosin.6 (2019)   Warfarin maintenance plan:   7.5 mg (5 mg x 1.5) every Sat; 5 mg (5 mg x 1) all other days   Full warfarin instructions:   7.5 mg every Sat; 5 mg all other days   Weekly warfarin total:   37.5 mg   Weekly max warfarin dose:   45 mg   No change documented:   Yamilex Gutierrez RN   Plan last modified:   Analisa Vigil RN (2019)   Next INR check:   2019   Target end date:   Indefinite    Indications    Long-term (current) use of anticoagulants [Z79.01] [Z79.01]  Pulmonary embolism  bilateral (H) [I26.99]  Paroxysmal atrial fibrillation (H) [I48.0]             Anticoagulation Episode Summary     INR check location:   Anticoagulation Clinic    Preferred lab:       Send INR reminders to:    ANTICOAGULATION    Comments:         Anticoagulation Care Providers     Provider Role Specialty Phone number    Karson Bishop MD [facility] [phone]            See the Encounter Report to view Anticoagulation Flowsheet and Dosing Calendar (Go to Encounters tab in chart review, and find the Anticoagulation Therapy Visit)    Pt recently hospitalized for cellulitis and started on Keflex. Dosage adjustment made based on physician directed  care plan.     Pt aware if signs of clotting (pain, tenderness, swelling, color change in leg or arm, SOB) and bleeding occur (blood in stool, urine, large bruising, bleeding gums, nosebleeds) to have INR check sooner. If sx severe report to ER or concerned for stroke call 911. If general questions or concerns arise, call clinic.    Yamilex Gutierrez RN

## 2019-05-20 NOTE — TELEPHONE ENCOUNTER
Chief Complaint: Cellulitis Of Right Upper Extremity, Cellulitis Of Hand, Right,FRI 17-MAY-2019 ,ed/ip 0 / 2    [phone] (home)

## 2019-05-21 ENCOUNTER — PATIENT OUTREACH (OUTPATIENT)
Dept: CARE COORDINATION | Facility: CLINIC | Age: 84
End: 2019-05-21

## 2019-05-21 ASSESSMENT — ACTIVITIES OF DAILY LIVING (ADL): DEPENDENT_IADLS:: INDEPENDENT

## 2019-05-21 NOTE — LETTER
[facility]  Complex Care Plan  About Me:    Patient Name:  Hermilo Velasquez    YOB: 1932  Age:         86 year old   [place] MRN:    [id number] Telephone Information:  Home Phone [phone]   Mobile none       Address:  [address]  [facility] [address] Email address:  No e-mail address on record      Emergency Contact(s)    Name Relationship Lgl Grd Work Phone Home Phone Mobile Phone   1. YVES, RAND Spouse No  [phone]   2. JENNIFER, MARC Son No  [phone]           Primary language:  English     needed? No   [place] [employer]:  [phone] op. 1  Other communication barriers: None  Preferred Method of Communication:  Mail  Current living arrangement: I live in a private home   Mobility Status/ Medical Equipment: Independent w/Device    Health Maintenance  Health Maintenance Reviewed: Not assessed    My Access Plan  Medical Emergency 911   Primary Clinic Line [facility] - [phone]   24 Hour Appointment Line [phone] or  [phone]) (toll-free)   24 Hour Nurse Line [phone] (toll-free)   Preferred Urgent Care [facility], [phone]   Preferred Hospital [facility]  [phone]   Preferred Pharmacy [id number] - [place], [place] - [address] Behavioral Health Crisis Line The National Suicide Prevention Lifeline at 1-601.957.1873 or 911     My Care Team Members  Patient Care Team       Relationship Specialty Notifications Start End    Karson Bishop MD PCP - General Internal Medicine  2/6/18     Phone: [phone] Pager: [phone] Fax: [phone] [address] [place] [address]    Margarita Aguirre MD MD Orthopedics  8/30/16     Phone: [phone] Fax: [phone]         MARGARITA AGUIRRE MD [address] [place] [address]    Karson Bishop MD Assigned PCP    "4/26/19     Phone: [phone] Pager: [phone] Fax: [phone] [address] [place] [address]    Charisse Carrero, RN Lead Care Coordinator Primary Care - CC  5/21/19             My Care Plans  Self Management and Treatment Plan  Goals and (Comments)  Goals        General    1. Medical (pt-stated)     Notes - Note edited  5/21/2019 12:14 PM by Charisse Carrero RN    Goal Statement: \"I need help getting a brace for my knee.\"  Measure of Success: Patient will have a knee brace to use within a month.  Supportive Steps to Achieve:     RN Care Coordinator will collaborate with PCP to have an order in place.    RN Care Coordinator will provide resources to patient.    Patient will obtain the durable medical equipment.    RN CC will assist patient throughout along the way, as needed.  Barriers: None.  Strengths: Patient is pro-active.  Date to Achieve By: 6/21/2019  Patient expressed understanding of goal: Yes.               Advance Care Plans/Directives Type:   Type Advanced Care Plans/Directives: POLST    My Medical and Care Information  Problem List   Patient Active Problem List   Diagnosis     Low back pain     Ventral hernia     Nonrheumatic aortic valve stenosis     GERD (gastroesophageal reflux disease)     Non Hodgkin's lymphoma (H): 2013     Microscopic hematuria     Health Care Home     CARDIOVASCULAR SCREENING; LDL GOAL LESS THAN 130     History of colonic polyps     Neuropathy     Anemia due to blood loss, acute     Physical deconditioning     Paroxysmal atrial fibrillation (H)     Need for SBE (subacute bacterial endocarditis) prophylaxis     RBBB (right bundle branch block)     Gastrointestinal hemorrhage with melena     Primary osteoarthritis of both knees     Primary osteoarthritis of left hip     Pulmonary embolism, bilateral (H)     S/P IVC filter     Long-term (current) use of anticoagulants [Z79.01]     Benign neoplasm of colon, unspecified part of colon     Pulmonary nodules "     Benign neoplasm of colon     Primary osteoarthritis of left knee     Essential hypertension with goal blood pressure less than 140/90     Non-Hodgkin's lymphoma, unspecified body region, unspecified non-Hodgkin lymphoma type (H)     Anticoagulated on Coumadin     Other chronic pulmonary embolism without acute cor pulmonale (H)     Epistaxis     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     Aftercare following left knee joint replacement surgery     Rash     Drainage from wound: left Knee     Lower extremity edema     Leg edema, left     S/P total knee arthroplasty     ACP (advance care planning)     Hyperlipidemia LDL goal <130     Chronic obstructive pulmonary disease, unspecified COPD type (H)     Iron deficiency anemia due to chronic blood loss     Cervical pain     Neck stiffness     Spongiotic dermatitis     Nodule of lower lobe of right lung     Cellulitis      Current Medications and Allergies:  See printed Medication Report.    Care Coordination Start Date: 5/21/2019   Frequency of Care Coordination: monthly   Form Last Updated: 05/29/2019

## 2019-05-21 NOTE — LETTER
[facility] CARE COORDINATION  [address] [facility] [address]  [place] [facility] [address]    May 21, 2019    Hermilo Velasquez  [address]  [place] [address]      Dear Hermilo,    I am a clinic care coordinator who works at [facility] [employer]. I wanted to thank you for spending time to talk to me today. I also wanted to provide you with my contact information so that you can call me with questions or concerns about your health care. Below is a description of clinic care coordination and how I can further assist you.     The goal of clinic care coordination is to help you manage your health and improve access to the [facility] system in the most efficient manner. The registered nurse can assist you in meeting your health care goals by providing education, coordinating services, and strengthening the communication among your providers. The  can assist you with financial, behavioral, psychosocial, chemical dependency, counseling, and/or psychiatric resources.    Please feel free to contact me at [phone]. We at [facility] are focused on providing you with the highest-quality healthcare experience possible and that all starts with you.     Sincerely,         Charisse Carrero RN Care Coordinator  [facility]  Phone:  [phone] (Mondays, Wednesdays & Fridays)  Phone:  [phone] (Tuesdays & Thursdays)  Email: [email or web address]      Enclosed: I have enclosed a copy of the Access Care & Complex Care Plans. These have helpful information and goals that we have talked about. Please keep this in an easy to access place to use as needed.

## 2019-05-21 NOTE — PROGRESS NOTES
Clinic Care Coordination Contact  OUTREACH    Referral Information:  Referral Source: IP Report    Primary Diagnosis: Orthopedic    Chief Complaint   Patient presents with     Clinic Care Coordination - Post Hospital     Discharge follow-up     RN Care Coordinator contacted patient and engaged in AIDET communication during encounter.     Universal Utilization: Patient was hospitalized at [facility] from 5/13/19 to 5/17/19 for right hand swelling/erythema/pain.  Clinic Utilization  Difficulty keeping appointments:: No  Compliance Concerns: No  No-Show Concerns: No  No PCP office visit in Past Year: No  Utilization    Last refreshed: 5/20/2019  8:51 PM:  Hospital Admissions 3           Last refreshed: 5/20/2019  8:51 PM:  ED Visits 0           Last refreshed: 5/20/2019  8:51 PM:  No Show Count (past year) 0              Current as of: 5/20/2019  8:51 PM            Clinical Concerns:  Current Medical Concerns:  Patient was diagnosed with sepsis 2/2 to right hand cellulitis and given antibiotics. He was discharged with oral Keflex and instructions to follow up with PCP within 7 days.  Other medical history at discharge:    Chronic right knee pain/osteoarthritis    COPD    Non Hodgkins lymphoma  Current Behavioral Concerns: None. Patient was alert, had bright affect and appropriate responses to questions.    Education Provided to patient: about care coordination role.      Health Maintenance Reviewed: Not assessed  Clinical Pathway: None    Medication Management:  No questions or concerns about antibiotic prescribed at discharge.    Functional Status:  Dependent ADLs:: Ambulation-cane; walker  Dependent IADLs:: Independent  Bed or wheelchair confined:: No  Mobility Status: Independent w/Device  Fallen 2 or more times in the past year?: No  Any fall with injury in the past year?: No    Living Situation:  Current living arrangement:: I live in a private home with spouse  Type of residence:: Private home -  "stairs    Diet/Exercise/Sleep:  Diet:: No added salt, Low cholesterol, Low saturated fat  Inadequate nutrition (GOAL):: No  Food Insecurity: No  Tube Feeding: No  Exercise:: Currently not exercising  Inadequate activity/exercise (GOAL):: No  Significant changes in sleep pattern (GOAL): No    Transportation:  Transportation concerns (GOAL):: No  Transportation means:: Regular car, Family     Psychosocial:  Tenriism or spiritual beliefs that impact treatment:: No  Mental health DX:: No  Mental health management concern (GOAL):: No  Informal Support system:: Family, Spouse     Financial/Insurance: UCARE/MEDICARE  Financial/Insurance concerns (GOAL):: No     Resources and Interventions:  Community Resources: None  Supplies used at home:: None  Equipment Currently Used at Home: cane, straight; walker.    Advance Care Plan/Directive  Advanced Care Plans/Directives on file:: Yes  Type Advanced Care Plans/Directives: POLST  Advanced Care Plan/Directive Status: Not Applicable    Referrals Placed: Other (DME)     Goals:   Goals        General    1. Medical (pt-stated)     Notes - Note edited  5/21/2019 12:14 PM by Charisse Carrero RN    Goal Statement: \"I need help getting a brace for my knee.\"  Measure of Success: Patient will have a knee brace to use within a month.  Supportive Steps to Achieve:     RN Care Coordinator will collaborate with PCP to have an order in place.    RN Care Coordinator will provide resources to patient.    Patient will obtain the durable medical equipment.    RN CC will assist patient throughout along the way, as needed.  Barriers: None.  Strengths: Patient is pro-active.  Date to Achieve By: 6/21/2019  Patient expressed understanding of goal: Yes.            Patient/Caregiver understanding: Yes    Outreach Frequency: monthly  Future Appointments              In 3 days CS ANTICOAGULATION CLINIC [facility] Foster    In 3 days Karson Bishop MD [facility]    In 2 weeks CS " ANTICOAGULATION CLINIC [facility]           Plan: As stated on the above goal.  RN Clinic Care Coordinator will mail care coordination introduction letter, Access Care Plan and current medication list to patient. RN Clinic Care Coordinator will follow up with patient in a month.    Charisse Carrero RN Care Coordinator  [facility]  Phone:  [phone] (Mondays, Wednesdays & Fridays)  Phone:  [phone] (Tuesdays & Thursdays)  Email: [email or web address]

## 2019-05-24 ENCOUNTER — ANTICOAGULATION THERAPY VISIT (OUTPATIENT)
Dept: NURSING | Facility: CLINIC | Age: 84
End: 2019-05-24
Payer: COMMERCIAL

## 2019-05-24 ENCOUNTER — OFFICE VISIT (OUTPATIENT)
Dept: FAMILY MEDICINE | Facility: CLINIC | Age: 84
End: 2019-05-24
Payer: COMMERCIAL

## 2019-05-24 VITALS
WEIGHT: 176.8 LBS | TEMPERATURE: 97.8 F | DIASTOLIC BLOOD PRESSURE: 54 MMHG | BODY MASS INDEX: 25.31 KG/M2 | SYSTOLIC BLOOD PRESSURE: 104 MMHG | OXYGEN SATURATION: 93 % | HEIGHT: 70 IN | HEART RATE: 65 BPM

## 2019-05-24 DIAGNOSIS — M25.561 CHRONIC PAIN OF RIGHT KNEE: Primary | ICD-10-CM

## 2019-05-24 DIAGNOSIS — I48.0 PAROXYSMAL ATRIAL FIBRILLATION (H): ICD-10-CM

## 2019-05-24 DIAGNOSIS — I26.99 PULMONARY EMBOLISM, BILATERAL (H): ICD-10-CM

## 2019-05-24 DIAGNOSIS — G89.29 CHRONIC PAIN OF RIGHT KNEE: Primary | ICD-10-CM

## 2019-05-24 LAB — INR POINT OF CARE: 3 (ref 0.86–1.14)

## 2019-05-24 PROCEDURE — 99207 ZZC NO CHARGE NURSE ONLY: CPT

## 2019-05-24 PROCEDURE — 36416 COLLJ CAPILLARY BLOOD SPEC: CPT

## 2019-05-24 PROCEDURE — 20610 DRAIN/INJ JOINT/BURSA W/O US: CPT | Performed by: INTERNAL MEDICINE

## 2019-05-24 PROCEDURE — 99495 TRANSJ CARE MGMT MOD F2F 14D: CPT | Mod: 25 | Performed by: INTERNAL MEDICINE

## 2019-05-24 PROCEDURE — 85610 PROTHROMBIN TIME: CPT | Mod: QW

## 2019-05-24 RX ORDER — TRIAMCINOLONE ACETONIDE 40 MG/ML
40 INJECTION, SUSPENSION INTRA-ARTICULAR; INTRAMUSCULAR ONCE
Status: DISCONTINUED | OUTPATIENT
Start: 2019-05-24 | End: 2019-11-06 | Stop reason: CLARIF

## 2019-05-24 ASSESSMENT — MIFFLIN-ST. JEOR: SCORE: 1488.21

## 2019-05-24 NOTE — PROGRESS NOTES
Subjective     Hermilo Velasquez is a 86 year old male who presents to clinic today for the following health issues:    HPI       Hospital Follow-up Visit:    Hospital/Nursing Home/IP Rehab Facility: [facility]  Date of Admission: 5-  Date of Discharge: 5-  Reason(s) for Admission: Sepsis 2/2 to right hand cellulitis.            Problems taking medications regularly:  None       Medication changes since discharge: None       Problems adhering to non-medication therapy:  None    Summary of hospitalization:  [facility] discharge summary reviewed  Diagnostic Tests/Treatments reviewed.  Follow up needed: none  Other Healthcare Providers Involved in Patient s Care:         None  Update since discharge: improved.     Post Discharge Medication Reconciliation: discharge medications reconciled, continue medications without change.  Plan of care communicated with patient     Coding guidelines for this visit:  Type of Medical   Decision Making Face-to-Face Visit       within 7 Days of discharge Face-to-Face Visit        within 14 days of discharge   Moderate Complexity [id number]   High Complexity 55980 [id number]            86-year-old former smoker who I am have known for many years now has a history of atrial fibrillation, lymphoma, recently excised early stage lung cancer, pulmonary embolism, multi articular osteoarthritis, hyperlipidemia, peripheral neuropathy, chronic pruritic rash.  He was hospitalized for cellulitis of the right wrist occurring after he cut his skin with a tool.  He also complained of a swollen, stiff, painful right knee at the time.  He has had chronic knee pain probably from osteoarthritis that has been managed with intermittent cortisone injections for several years now.  He was treated with initially IV and then oral antibiotics for his wrist cellulitis.  He was evaluated by orthopedic surgery who felt that an incision and drainage was not needed.  His wrist  "redness, pain and swelling has resolved completely.  He is completing the course of oral antibiotics as directed.  He had a knee arthrocentesis and had a large volume of joint fluid aspirated.  The fluid was negative for gout crystals and the culture remain negative.  He had a subsequent arthrocentesis by orthopedic surgery during his hospital stay and the joint fluid aspirated was culture negative again.  He complains of severe pain and stiffness in the right knee without redness, fevers, chills.  He states that the aspirations helped a little, but he requests a cortisone injection in the knee joint today.  He states that he can \"barely walk\".  In the past, for symptoms like this, a cortisone injection had been very helpful for many months.  He does not wish to have knee surgery given that he is only recently recovering from his lung cancer resection.    Patient Active Problem List   Diagnosis     Low back pain     Ventral hernia     Nonrheumatic aortic valve stenosis     GERD (gastroesophageal reflux disease)     Non Hodgkin's lymphoma (H): 2013     Microscopic hematuria     Health Care Home     CARDIOVASCULAR SCREENING; LDL GOAL LESS THAN 130     History of colonic polyps     Neuropathy     Anemia due to blood loss, acute     Physical deconditioning     Paroxysmal atrial fibrillation (H)     Need for SBE (subacute bacterial endocarditis) prophylaxis     RBBB (right bundle branch block)     Gastrointestinal hemorrhage with melena     Primary osteoarthritis of both knees     Primary osteoarthritis of left hip     Pulmonary embolism, bilateral (H)     S/P IVC filter     Long-term (current) use of anticoagulants [Z79.01]     Benign neoplasm of colon, unspecified part of colon     Pulmonary nodules     Benign neoplasm of colon     Primary osteoarthritis of left knee     Essential hypertension with goal blood pressure less than 140/90     Non-Hodgkin's lymphoma, unspecified body region, unspecified non-Hodgkin lymphoma " type (H)     Anticoagulated on Coumadin     Other chronic pulmonary embolism without acute cor pulmonale (H)     Epistaxis     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     Aftercare following left knee joint replacement surgery     Rash     Drainage from wound: left Knee     Lower extremity edema     Leg edema, left     S/P total knee arthroplasty     ACP (advance care planning)     Hyperlipidemia LDL goal <130     Chronic obstructive pulmonary disease, unspecified COPD type (H)     Iron deficiency anemia due to chronic blood loss     Cervical pain     Neck stiffness     Spongiotic dermatitis     Nodule of lower lobe of right lung     Cellulitis     Past Surgical History:   Procedure Laterality Date     APPENDECTOMY       AS TOTAL KNEE ARTHROPLASTY       BACK SURGERY       ESOPHAGOSCOPY, GASTROSCOPY, DUODENOSCOPY (EGD), COMBINED N/A 11/28/2015    Procedure: COMBINED ESOPHAGOSCOPY, GASTROSCOPY, DUODENOSCOPY (EGD);  Surgeon: Danis Castillo MD;  Location:  GI     ESOPHAGOSCOPY, GASTROSCOPY, DUODENOSCOPY (EGD), COMBINED N/A 7/26/2018    Procedure: COMBINED ESOPHAGOSCOPY, GASTROSCOPY, DUODENOSCOPY (EGD);;  Surgeon: Toby Dong DO;  Location:  GI     HERNIA REPAIR  2006     HERNIORRHAPHY VENTRAL  4/17/2013    Procedure: HERNIORRHAPHY VENTRAL;  VENTRAL HERNIA REPAIR WITH MESH;  Surgeon: Patel Guzman MD;  Location:  OR     KNEE SURGERY      arthroscopic right knee surgery      LOBECTOMY LUNG Right 3/26/2019    Procedure: POSSIBLE LOBECTOMY LUNG;  Surgeon: Jose A Vasques MD;  Location:  OR     REPAIR LIGAMENT ANKLE  2/23/2012    Procedure:REPAIR LIGAMENT ANKLE; LEFT TARSAL TUNNEL RELEASE OF KNOT OF ARIEL RELEASE; Surgeon:SAUL PUENTE; Location: OR     REPLACE VALVE AORTIC N/A 9/3/2015    Procedure: REPLACE VALVE AORTIC;  Surgeon: Antonino Mitchell MD;  Location:  OR     ROTATOR CUFF REPAIR RT/LT      bilateral     SPINE SURGERY      3 spine surgeries      THORACOTOMY, WEDGE RESECTION LUNG, COMBINED Right 3/26/2019    Procedure: RIGHT THORACOTOMY, WEDGE RESECTION, RIGHT LOWER LOBE LUNG NODULE,;  Surgeon: Jose A Vasques MD;  Location: [facility] OR     TONSILLECTOMY       TURP         Social History     Tobacco Use     Smoking status: Former Smoker     Packs/day: 2.00     Years: 55.00     Pack years: 110.00     Last attempt to quit: 1998     Years since quittin.3     Smokeless tobacco: Never Used   Substance Use Topics     Alcohol use: Yes     Alcohol/week: 0.0 oz     Comment: 1 drink per day     Family History   Problem Relation Age of Onset     C.A.D. Father      Emphysema Father      Melanoma No family hx of      Skin Cancer No family hx of          Current Outpatient Medications   Medication Sig Dispense Refill     albuterol (PROAIR HFA/PROVENTIL HFA/VENTOLIN HFA) 108 (90 Base) MCG/ACT Inhaler Inhale 1-2 puffs into the lungs every 6 hours as needed for shortness of breath / dyspnea or wheezing 3 Inhaler 5     atorvastatin (LIPITOR) 10 MG tablet Take 1 tablet (10 mg) by mouth daily 90 tablet 0     camphor-menthol (DERMASARRA) 0.5-0.5 % LOTN Apply a thin layer to itchy areas as often as desired 444 mL 11     cephALEXin (KEFLEX) 500 MG capsule Take 1 capsule (500 mg) by mouth every 6 hours for 7 days 28 capsule 0     doxepin (SINEQUAN) 50 MG capsule Take 50 mg by mouth daily       ferrous sulfate (FEROSUL) 325 (65 Fe) MG tablet Take 1 tablet (325 mg) by mouth every other day 60 tablet 3     gabapentin (NEURONTIN) 300 MG capsule Take 300 mg by mouth 2 times daily       hydrOXYzine (ATARAX) 25 MG tablet Take 50 mg by mouth nightly as needed (sleep)       loratadine (CLARITIN) 10 MG tablet Take 10 mg by mouth daily as needed for allergies        metoprolol succinate (TOPROL-XL) 100 MG 24 hr tablet Take 0.5 tablets (50 mg) by mouth daily 90 tablet 3     order for DME Equipment being ordered: Right neoprene knee brace 1 each 0     pantoprazole (PROTONIX) 40 MG  "EC tablet Take 40 mg by mouth daily       ranitidine (ZANTAC) 150 MG tablet Take 150 mg by mouth daily       tiotropium (SPIRIVA HANDIHALER) 18 MCG capsule Inhale 1 capsule (18 mcg) into the lungs daily 90 capsule 3     triamcinolone (KENALOG) 0.1 % cream Apply topically 2 times daily as needed for irritation       warfarin (COUMADIN) 5 MG tablet 7.5 mg Saturday  5 mg AOD       HYDROcodone-acetaminophen (NORCO) 5-325 MG tablet Take 1 tablet by mouth every 6 hours as needed for moderate to severe pain (Patient not taking: Reported on 5/24/2019) 28 tablet 0     Allergies   Allergen Reactions     Lidocaine Blisters     Allergy to lidocaine ointment     Omeprazole Itching     Pantoprazole Itching     Prevacid [Lansoprazole] Itching     Lasix [Furosemide] Rash     Penicillins Rash     \"broke out from injection\" 60 yrs ago           Reviewed and updated as needed this visit by Provider         Review of Systems   ROS COMP: Constitutional, HEENT, cardiovascular, pulmonary, gi and gu systems are negative, except as otherwise noted.      Objective    /54 (BP Location: Right arm, Patient Position: Sitting, Cuff Size: Adult Regular)   Pulse 65   Temp 97.8  F (36.6  C) (Oral)   Ht 1.778 m (5' 10\")   Wt 80.2 kg (176 lb 12.8 oz)   SpO2 93%   BMI 25.37 kg/m    Body mass index is 25.37 kg/m .  Physical Exam   General: This is a well-appearing man in no acute distress.  He does not appear toxic.  Musculoskeletal: There is no swelling or redness notable on the right wrist or distal upper extremity.  There is normal  strength.  There are well-healing laceration scars in the webspaces of the finger.  The right knee has full range of motion, there is no warmth to touch, there is no significant knee effusion, no Baker's cyst, there is severe joint line tenderness, the patient is walking with a cane and using a neoprene knee brace.    Diagnostic Test Results:  Labs reviewed in Epic        Assessment & Plan     1. Chronic " pain of right knee  The patient demands a cortisone injection in the right knee joint today.  We discussed options for managing his chronic right knee pain.  I suggested that he could see an orthopedic surgeon, because there may be more than just osteoarthritis contributing to his persistent knee pain and his large knee effusion that was aspirated in the emergency department.  There is no evidence for gout based on crystal analysis.  There is no evidence for infection based on culture data after 2 separate aspirations.  As such, I do not think there is any contraindication to a cortisone injection and if there is a soft tissue abnormality such as a meniscal tear contributing to his chronic pain, the cortisone injection may provide temporary relief.  An MRI of the knee might provide more information, but since the patient is reluctant to consider any surgical intervention, it may not change the management strategy in any meaningful way.  He really does not want to go see a orthopedic surgeon regarding his knee pain.  He would much prefer to just have a cortisone injection today and see if it addresses his symptoms.  After discussion of potential risks including the risk of infection and bleeding, we decided to proceed with a intra-articular cortisone injection today.    Right knee cortisone injection  After discussion of risks and benefits of the procedure including bleeding and infection, verbal informed consent was obtained.  Area prepped and draped in sterile fashion.  Local anesthesia was obtained with 1% lidocaine.   The suprapatellar pouch was entered using a lateral approach.  1cc of K40 was injected into the joint space.  The procedure was tolerated well and after care was discussed.    Following the procedure, the patient reported a significant reduction in knee pain and a significant improvement in his ability to walk.      - order for DME; Equipment being ordered: Right neoprene knee brace  Dispense: 1  "each; Refill: 0    His upper extremity cellulitis seems to be resolved.  He will complete the course of Keflex as directed.  BMI:   Estimated body mass index is 25.37 kg/m  as calculated from the following:    Height as of this encounter: 1.778 m (5' 10\").    Weight as of this encounter: 80.2 kg (176 lb 12.8 oz).   Weight management plan: Discussed healthy diet and exercise guidelines            Return in about 3 months (around 8/24/2019) for Pain Check.    Karson Bishop MD  [facility]      "

## 2019-05-24 NOTE — PROGRESS NOTES
ANTICOAGULATION FOLLOW-UP CLINIC VISIT    Patient Name:  Hermilo Velasquez  Date:  5/24/2019  Contact Type:  Face to Face    SUBJECTIVE:         OBJECTIVE    INR Protime   Date Value Ref Range Status   05/24/2019 3.0 (A) 0.86 - 1.14 Final       ASSESSMENT / PLAN  INR assessment THER    Recheck INR In: 2 WEEKS    INR Location Clinic      Anticoagulation Summary  As of 5/24/2019    INR goal:   2.0-3.0   TTR:   48.3 % (3.2 y)   INR used for dosing:   3.0 (5/24/2019)   Warfarin maintenance plan:   7.5 mg (5 mg x 1.5) every Sat; 5 mg (5 mg x 1) all other days   Full warfarin instructions:   7.5 mg every Sat; 5 mg all other days   Weekly warfarin total:   37.5 mg   Weekly max warfarin dose:   45 mg   No change documented:   Analisa Vigil RN   Plan last modified:   Analisa Vigil RN (4/17/2019)   Next INR check:   6/5/2019   Target end date:   Indefinite    Indications    Long-term (current) use of anticoagulants [Z79.01] [Z79.01]  Pulmonary embolism  bilateral (H) [I26.99]  Paroxysmal atrial fibrillation (H) [I48.0]             Anticoagulation Episode Summary     INR check location:   Anticoagulation Clinic    Preferred lab:       Send INR reminders to:   CS ANTICOAGULATION    Comments:         Anticoagulation Care Providers     Provider Role Specialty Phone number    Karson Bishop MD [facility] [phone]            See the Encounter Report to view Anticoagulation Flowsheet and Dosing Calendar (Go to Encounters tab in chart review, and find the Anticoagulation Therapy Visit)    Dosage adjustment made based on physician directed care plan.  INR 3.0.  Continue same plan and recheck INR 2 weeks.    Analisa Vigil RN

## 2019-05-28 ENCOUNTER — PATIENT OUTREACH (OUTPATIENT)
Dept: CARE COORDINATION | Facility: CLINIC | Age: 84
End: 2019-05-28

## 2019-05-28 ENCOUNTER — TELEPHONE (OUTPATIENT)
Dept: FAMILY MEDICINE | Facility: CLINIC | Age: 84
End: 2019-05-28

## 2019-05-28 DIAGNOSIS — I27.82 OTHER CHRONIC PULMONARY EMBOLISM WITHOUT ACUTE COR PULMONALE (H): Primary | ICD-10-CM

## 2019-05-28 RX ORDER — WARFARIN SODIUM 5 MG/1
5-7.5 TABLET ORAL DAILY
Qty: 135 TABLET | Refills: 0 | Status: SHIPPED | OUTPATIENT
Start: 2019-05-28 | End: 2019-10-12

## 2019-05-28 NOTE — PROGRESS NOTES
Clinic Care Coordination Contact  [facility]/Voicemail       Clinical Data: Care Coordinator Outreach  Patient is enrolled in Care Coordination. He called and left a message inquiring about the knee brace.    Outreach attempted x 1.  Left message on voicemail with call back information and requested return call. Left information on voicemail that Dr. Bishop ordered the brace to his pharmacy of preference ([facility] [address]).    Plan: Care Coordinator will try to reach patient again in 1-2 business days.    Charisse Carrero RN Care Coordinator  [facility]  Phone:  [phone] (Mondays, Wednesdays & Fridays)  Phone:  [phone] (Tuesdays & Thursdays)  Email: [email or web address]

## 2019-05-28 NOTE — TELEPHONE ENCOUNTER
Prescription approved per [facility] Refill Protocol.  Yamilex EVERETT RN

## 2019-05-28 NOTE — TELEPHONE ENCOUNTER
Reason for Call:  Medication or medication refill:    Do you use a [facility]?  Name of the pharmacy and phone number for the current request:     [id number] [place], [place] - [address]    Name of the medication requested: warfarin (COUMADIN) 7.5 MG tablet on Saturdays  warfarin (COUMADIN) 5 MG tablet for Sun-Fri    Other request: is requesting a 3 month supply    Can we leave a detailed message on this number? YES    Phone number patient can be reached at: Home number on file [phone] (home)    Best Time: any    Call taken on 5/28/2019 at 10:47 AM by Candace Hernandez

## 2019-05-29 ASSESSMENT — ACTIVITIES OF DAILY LIVING (ADL): DEPENDENT_IADLS:: INDEPENDENT

## 2019-05-29 NOTE — PROGRESS NOTES
"Clinic Care Coordination Contact  OUTREACH    Referral Information: Report   Primary Diagnosis: Orthopedic    Chief Complaint   Patient presents with     Clinic Care Coordination - Follow-up     DME order follow-up     RN Care Coordinator called patient and engaged in AIDET communication. Patient stated he did not listen to writer's voicemail. RN CC informed patient that Dr. Bishop had sent the knee brace order to his pharmacy.  He wanted to know if his insurance will cover. RN CC instructed patient to call pharmacy to verify that. RN CC also told patient that if it does not cover it, he should call writer back so we can verify other resources available.    Financial/Insurance: [employer] MEDICARE  Financial/Insurance concerns (GOAL):: No     Resources:  Equipment Currently Used at Home: cane, knee brace (old one)       Goals:   Goals        General    1. Medical (pt-stated)     Notes - Note edited  5/21/2019 12:14 PM by Charisse Carrero RN    Goal Statement: \"I need help getting a brace for my knee.\"  Measure of Success: Patient will have a knee brace to use within a month.  Supportive Steps to Achieve:     RN Care Coordinator will collaborate with PCP to have an order in place.    RN Care Coordinator will provide resources to patient.    Patient will obtain the durable medical equipment.    RN CC will assist patient throughout along the way, as needed.  Barriers: None.  Strengths: Patient is pro-active.  Date to Achieve By: 6/21/2019  Patient expressed understanding of goal: Yes.            As of today's date 5/29/2019 goal is met at 26 - 50%.   Goal Status:  Showing progress    Outreach Frequency: monthly    Plan: Patient will contact his pharmacy of choice to verify insurance coverage for knee brace that was prescribed.  RN Care Coordinator will follow up with patient in 3 weeks.    Charisse Carrero RN Care Coordinator  [facility]  Phone:  [phone] (Mondays, Wednesdays & " Fridays)  Phone:  [phone] (Tuesdays & Thursdays)  Email: [email or web address]

## 2019-06-04 ENCOUNTER — PATIENT OUTREACH (OUTPATIENT)
Dept: CARE COORDINATION | Facility: CLINIC | Age: 84
End: 2019-06-04

## 2019-06-04 NOTE — LETTER
June 4, 2019      Hermilo Velasquez  [address] [place] [address]        Dear Hermilo,       According to our phone conversation today, your local pharmacy does not have the neoprene knee brace Dr. Bishop has given you a prescription for.    Please find enclosed information on three other resources for Durable Medical Equipment (DME), which is what the knee brace is considered:    1. [facility]  2. [facility]  3. [facility]    I hope one of those will be able to supply you the knee brace. You will have to contact them with your insurance information to see about coverage.    Please let me know if you need further assistance from me.    Sincerely,        Charisse Carrero RN Care Coordinator  [facility]  Phone:  [phone] (Mondays, Wednesdays & Fridays)  Phone:  [phone] (Tuesdays & Thursdays)  Email: [email or web address]        Enclosed: 6 pages with information on the DME suppliers mentioned above.

## 2019-06-05 ENCOUNTER — ANTICOAGULATION THERAPY VISIT (OUTPATIENT)
Dept: NURSING | Facility: CLINIC | Age: 84
End: 2019-06-05
Payer: COMMERCIAL

## 2019-06-05 DIAGNOSIS — I26.99 PULMONARY EMBOLISM, BILATERAL (H): ICD-10-CM

## 2019-06-05 DIAGNOSIS — I48.0 PAROXYSMAL ATRIAL FIBRILLATION (H): ICD-10-CM

## 2019-06-05 LAB — INR POINT OF CARE: 4.3 (ref 0.86–1.14)

## 2019-06-05 PROCEDURE — 36416 COLLJ CAPILLARY BLOOD SPEC: CPT

## 2019-06-05 PROCEDURE — 85610 PROTHROMBIN TIME: CPT | Mod: QW

## 2019-06-05 PROCEDURE — 99207 ZZC NO CHARGE NURSE ONLY: CPT

## 2019-06-05 NOTE — PROGRESS NOTES
ANTICOAGULATION FOLLOW-UP CLINIC VISIT    Patient Name:  Hermilo Velasquez  Date:  2019  Contact Type:  Face to Face    SUBJECTIVE:  Patient Findings     Comments:   Patient denies any identifiable changes that caused the Supratherapeutic INR.           Clinical Outcomes     Negatives:   Major bleeding event, Thromboembolic event, Anticoagulation-related hospital admission, Anticoagulation-related ED visit, Anticoagulation-related fatality    Comments:   Patient denies any identifiable changes that caused the Supratherapeutic INR.              OBJECTIVE    INR Protime   Date Value Ref Range Status   2019 4.3 (A) 0.86 - 1.14 Final       ASSESSMENT / PLAN  No question data found.  Anticoagulation Summary  As of 2019    INR goal:   2.0-3.0   TTR:   47.8 % (3.2 y)   INR used for dosin.3! (2019)   Warfarin maintenance plan:   7.5 mg (5 mg x 1.5) every Sat; 5 mg (5 mg x 1) all other days   Full warfarin instructions:   : Hold; Otherwise 7.5 mg every Sat; 5 mg all other days   Weekly warfarin total:   37.5 mg   Weekly max warfarin dose:   45 mg   Plan last modified:   Analisa Vigil RN (2019)   Next INR check:   2019   Target end date:   Indefinite    Indications    Long-term (current) use of anticoagulants [Z79.01] [Z79.01]  Pulmonary embolism  bilateral (H) [I26.99]  Paroxysmal atrial fibrillation (H) [I48.0]             Anticoagulation Episode Summary     INR check location:   Anticoagulation Clinic    Preferred lab:       Send INR reminders to:   [facility] ANT[facility]AG[facility]    Comments:         Anticoagulation Care Providers     Provider Role Specialty Phone number    Karson Bishop MD [facility] Internal Medicine [phone]            See the Encounter Report to view Anticoagulation Flowsheet and Dosing Calendar (Go to Encounters tab in chart review, and find the Anticoagulation Therapy Visit)        Tammy Peters RN

## 2019-06-11 ENCOUNTER — TRANSFERRED RECORDS (OUTPATIENT)
Dept: HEALTH INFORMATION MANAGEMENT | Facility: CLINIC | Age: 84
End: 2019-06-11

## 2019-06-17 ENCOUNTER — TELEPHONE (OUTPATIENT)
Dept: FAMILY MEDICINE | Facility: CLINIC | Age: 84
End: 2019-06-17

## 2019-06-17 NOTE — TELEPHONE ENCOUNTER
Last INR check was on 6/5/19. Pt was 4.3. Pt canceled his appointment for recheck on 6/19/19 and rescheduled for 6/24/19.

## 2019-06-24 ENCOUNTER — ANTICOAGULATION THERAPY VISIT (OUTPATIENT)
Dept: NURSING | Facility: CLINIC | Age: 84
End: 2019-06-24
Payer: COMMERCIAL

## 2019-06-24 LAB — INR POINT OF CARE: 3.5 (ref 0.86–1.14)

## 2019-06-24 PROCEDURE — 85610 PROTHROMBIN TIME: CPT | Mod: QW

## 2019-06-24 PROCEDURE — 36416 COLLJ CAPILLARY BLOOD SPEC: CPT

## 2019-06-24 NOTE — PROGRESS NOTES
ANTICOAGULATION FOLLOW-UP CLINIC VISIT    Patient Name:  Hermilo Velasquez  Date:  6/24/2019  Contact Type:  Face to Face    SUBJECTIVE:  Patient Findings     Comments:   Patient denies any identifiable changes that caused the increase in INR.         Clinical Outcomes     Comments:   Patient denies any identifiable changes that caused the increase in INR.            OBJECTIVE    INR Protime   Date Value Ref Range Status   06/24/2019 3.5 (A) 0.86 - 1.14 Final       ASSESSMENT / PLAN  INR assessment SUPRA    Recheck INR In: 2 WEEKS    INR Location Clinic      Anticoagulation Summary  As of 6/24/2019    INR goal:   2.0-3.0   TTR:   47.1 % (3.2 y)   INR used for dosing:   3.5! (6/24/2019)   Warfarin maintenance plan:   5 mg (5 mg x 1) every day   Full warfarin instructions:   5 mg every day   Weekly warfarin total:   35 mg   Weekly max warfarin dose:   45 mg   Plan last modified:   Mesha Bah RN (6/24/2019)   Next INR check:   7/10/2019   Target end date:   Indefinite    Indications    Long-term (current) use of anticoagulants [Z79.01] [Z79.01]  Pulmonary embolism  bilateral (H) [I26.99]  Paroxysmal atrial fibrillation (H) [I48.0]             Anticoagulation Episode Summary     INR check location:   Anticoagulation Clinic    Preferred lab:       Send INR reminders to:   TERRIE JACKSON    Comments:         Anticoagulation Care Providers     Provider Role Specialty Phone number    Karson Bishop MD [facility] Internal Medicine [phone]            See the Encounter Report to view Anticoagulation Flowsheet and Dosing Calendar (Go to Encounters tab in chart review, and find the Anticoagulation Therapy Visit)    Pt INR is 3.5 today. Pt INR was elevated 2 weeks ago as well. Pt denies any changes in diet,activity or medication that could have caused this. Maintenance dose decreased. Pt advised to take 5 mg daily. Recheck in 2 weeks. Pt mentioned that he fell about 1 week ago and got a skin tear on his right  forearm. Pt has been dressing it with a pad and gauze. Pt has been keeping it clean and dry. The wound looks good. No drainage. A tegaderm applied to the healing wound. Pt advised to keep it on for a few days then it will start to peel off. Pt to remove it while in the shower or while the dressing is underwater. Pt given extra tegaderms for reapplication as needed. Hermilo aware if signs of clotting (pain, tenderness, swelling, color change in leg or arm, SOB) and bleeding occur (blood in stool, urine, large bruising, bleeding gums, nosebleeds) to have INR check sooner. If sx severe report to ER or concerned for stroke call 911. If general questions or concerns arise, call clinic.         Mesha Bah RN

## 2019-06-25 ENCOUNTER — TELEPHONE (OUTPATIENT)
Dept: FAMILY MEDICINE | Facility: CLINIC | Age: 84
End: 2019-06-25

## 2019-06-25 DIAGNOSIS — M25.561 CHRONIC PAIN OF RIGHT KNEE: ICD-10-CM

## 2019-06-25 DIAGNOSIS — G89.29 CHRONIC PAIN OF RIGHT KNEE: ICD-10-CM

## 2019-06-25 NOTE — TELEPHONE ENCOUNTER
Reason for Call:  Medication or medication refill:    Do you use a [facility]?  Name of the pharmacy and phone number for the current request:  [facility] ph.[phone]      Name of the medication requested: Sig: Equipment being ordered: Right neoprene knee brace    Other request: pt needs rx faxed to this dme to get his knee brace . Please fax to [phone]    Can we leave a detailed message on this number? YES    Phone number patient can be reached at: Home number on file [phone] (home)    Best Time: any    Call taken on 6/25/2019 at 12:42 PM by Jona Stauffer

## 2019-06-25 NOTE — TELEPHONE ENCOUNTER
Knee brace Rx was printed at 5-24-19 office visit   but pended again to print and fax to [facility]  fax to [phone].       (not sure why fax # so different from ph #)

## 2019-07-03 ENCOUNTER — TRANSFERRED RECORDS (OUTPATIENT)
Dept: HEALTH INFORMATION MANAGEMENT | Facility: CLINIC | Age: 84
End: 2019-07-03

## 2019-07-08 ENCOUNTER — ANTICOAGULATION THERAPY VISIT (OUTPATIENT)
Dept: NURSING | Facility: CLINIC | Age: 84
End: 2019-07-08
Payer: COMMERCIAL

## 2019-07-08 LAB — INR POINT OF CARE: 1.7 (ref 0.86–1.14)

## 2019-07-08 PROCEDURE — 85610 PROTHROMBIN TIME: CPT | Mod: QW

## 2019-07-08 PROCEDURE — 36416 COLLJ CAPILLARY BLOOD SPEC: CPT

## 2019-07-08 PROCEDURE — 99207 ZZC NO CHARGE NURSE ONLY: CPT

## 2019-07-08 NOTE — PROGRESS NOTES
ANTICOAGULATION FOLLOW-UP CLINIC VISIT    Patient Name:  Hermilo Velasquez  Date:  2019  Contact Type:  Face to Face    SUBJECTIVE:  Patient Findings     Positives:   Missed doses    Comments:   Pt states that he may have missed a dose on 19. Pt denies a cause for the fluctuation is INR.        Clinical Outcomes     Comments:   Pt states that he may have missed a dose on 19. Pt denies a cause for the fluctuation is INR.           OBJECTIVE    INR Protime   Date Value Ref Range Status   2019 1.7 (A) 0.86 - 1.14 Final       ASSESSMENT / PLAN  INR assessment SUB    Recheck INR In: 2 WEEKS    INR Location Clinic      Anticoagulation Summary  As of 2019    INR goal:   2.0-3.0   TTR:   47.2 % (3.3 y)   INR used for dosin.7! (2019)   Warfarin maintenance plan:   5 mg (5 mg x 1) every day   Full warfarin instructions:   : 7.5 mg; : 7.5 mg; Otherwise 5 mg every day   Weekly warfarin total:   35 mg   Weekly max warfarin dose:   45 mg   Plan last modified:   Mesha Bah RN (2019)   Next INR check:   2019   Target end date:   Indefinite    Indications    Long-term (current) use of anticoagulants [Z79.01] [Z79.01]  Pulmonary embolism  bilateral (H) [I26.99]  Paroxysmal atrial fibrillation (H) [I48.0]             Anticoagulation Episode Summary     INR check location:   Anticoagulation Clinic    Preferred lab:       Send INR reminders to:   TERRIE JACKSON    Comments:         Anticoagulation Care Providers     Provider Role Specialty Phone number    Karson Bishop MD [facility] Internal Medicine [phone]            See the Encounter Report to view Anticoagulation Flowsheet and Dosing Calendar (Go to Encounters tab in chart review, and find the Anticoagulation Therapy Visit)    Pt INR is 1.7 today. Pt sates that he may have missed a dose on 19. Pt states that he has about 1 alcoholic beverage a day. Pt denies having more than that which can cause fluctuation in INR.  Pt advised to take 7.5 mg today 7/8/19 then 7.5 mg next week on 7/17/19 then 5 mg all the other days. Recheck in 2 weeks. Hermilo aware if signs of clotting (pain, tenderness, swelling, color change in leg or arm, SOB) and bleeding occur (blood in stool, urine, large bruising, bleeding gums, nosebleeds) to have INR check sooner. If sx severe report to ER or concerned for stroke call 911. If general questions or concerns arise, call clinic.         Mesha Bah RN

## 2019-07-11 ENCOUNTER — PATIENT OUTREACH (OUTPATIENT)
Dept: CARE COORDINATION | Facility: CLINIC | Age: 84
End: 2019-07-11

## 2019-07-11 ASSESSMENT — ACTIVITIES OF DAILY LIVING (ADL): DEPENDENT_IADLS:: INDEPENDENT

## 2019-07-11 NOTE — PROGRESS NOTES
"Clinic Care Coordination Contact    [facility]/Voicemail  Pro-Active Outreach/Care Coordinator Outreach    Clinical Data:   Patient has a medical history of:    Chronic right knee pain/osteoarthritis    COPD    Non Hodgkins lymphoma    Assessment:  Outreach attempted x 2.  Left message on voicemail with new RN Care Coordination information for patient to call back as needed.        Goals:   Goals        General    1. Medical (pt-stated)     Notes - Note edited  5/21/2019 12:14 PM by Charisse Carrero RN    Goal Statement: \"I need help getting a brace for my knee.\"  Measure of Success: Patient will have a knee brace to use within a month.  Supportive Steps to Achieve:     RN Care Coordinator will collaborate with PCP to have an order in place.    RN Care Coordinator will provide resources to patient.    Patient will obtain the durable medical equipment.    RN CC will assist patient throughout along the way, as needed.  Barriers: None.  Strengths: Patient is pro-active.  Date to Achieve By: 6/21/2019  Patient expressed understanding of goal: Yes.            As of today's date 7/11/2019 goal is met at 76 - 100%.   Goal Status:  Complete    Plan:   Patient will follow up with his scheduled appointments.  Future Appointments              In 1 week  ANTICOAGULATION CLINIC [facility]     In 1 month Karson Bishop MD [facility]        Care Coordinator will do no further outreaches at this time.    Charisse Carrero RN Care Coordinator  [facility]  Phone:  [phone] (Mondays, Wednesdays & Fridays)  Phone:  [phone] (Tuesdays & Thursdays)  Email: [email or web address]        "

## 2019-07-22 ENCOUNTER — ANTICOAGULATION THERAPY VISIT (OUTPATIENT)
Dept: NURSING | Facility: CLINIC | Age: 84
End: 2019-07-22
Payer: COMMERCIAL

## 2019-07-22 LAB — INR POINT OF CARE: 3.3 (ref 0.86–1.14)

## 2019-07-22 PROCEDURE — 36416 COLLJ CAPILLARY BLOOD SPEC: CPT

## 2019-07-22 PROCEDURE — 99207 ZZC NO CHARGE NURSE ONLY: CPT

## 2019-07-22 PROCEDURE — 85610 PROTHROMBIN TIME: CPT | Mod: QW

## 2019-07-22 NOTE — PROGRESS NOTES
ANTICOAGULATION FOLLOW-UP CLINIC VISIT    Patient Name:  Hermilo Velasquez  Date:  7/22/2019  Contact Type:  Face to Face    SUBJECTIVE:  Patient Findings     Positives:   Change in health    Comments:   Pt reports right knee pain. Pt seeing  (ortho) on 7/23/19        Clinical Outcomes     Comments:   Pt reports right knee pain. Pt seeing  (ortho) on 7/23/19           OBJECTIVE    INR Protime   Date Value Ref Range Status   07/22/2019 3.3 (A) 0.86 - 1.14 Final       ASSESSMENT / PLAN  INR assessment SUPRA    Recheck INR In: 3 WEEKS    INR Location Clinic      Anticoagulation Summary  As of 7/22/2019    INR goal:   2.0-3.0   TTR:   47.3 % (3.3 y)   INR used for dosing:   3.3! (7/22/2019)   Warfarin maintenance plan:   5 mg (5 mg x 1) every day   Full warfarin instructions:   5 mg every day   Weekly warfarin total:   35 mg   Weekly max warfarin dose:   45 mg   Plan last modified:   Mesha Bah RN (7/8/2019)   Next INR check:   8/13/2019   Target end date:   Indefinite    Indications    Long-term (current) use of anticoagulants [Z79.01] [Z79.01]  Pulmonary embolism  bilateral (H) [I26.99]  Paroxysmal atrial fibrillation (H) [I48.0]             Anticoagulation Episode Summary     INR check location:   Anticoagulation Clinic    Preferred lab:       Send INR reminders to:   TERRIE JACKSON    Comments:         Anticoagulation Care Providers     Provider Role Specialty Phone number    Karson Bishop MD [facility] Internal Medicine [phone]            See the Encounter Report to view Anticoagulation Flowsheet and Dosing Calendar (Go to Encounters tab in chart review, and find the Anticoagulation Therapy Visit)    Pt INR is 3.3 today. Pt INR has been fluctuating. Pt advised to take 5 mg daily. Pt reports that he has alcohol about once a day but sometimes not at all. Pt states that he would like to recheck in 3 weeks so [place] longer. Pt scheduled for INR recheck on 8/13/19.  Hermilo aware if signs of clotting (pain, tenderness, swelling, color change in leg or arm, SOB) and bleeding occur (blood in stool, urine, large bruising, bleeding gums, nosebleeds) to have INR check sooner. If sx severe report to ER or concerned for stroke call 911. If general questions or concerns arise, call clinic.         Mesha Bah RN

## 2019-07-23 ENCOUNTER — TRANSFERRED RECORDS (OUTPATIENT)
Dept: HEALTH INFORMATION MANAGEMENT | Facility: CLINIC | Age: 84
End: 2019-07-23

## 2019-07-23 ENCOUNTER — TELEPHONE (OUTPATIENT)
Dept: CARDIOLOGY | Facility: CLINIC | Age: 84
End: 2019-07-23

## 2019-07-23 DIAGNOSIS — E78.2 MIXED HYPERLIPIDEMIA: ICD-10-CM

## 2019-07-23 NOTE — LETTER
July 23, 2019       TO: Hermilo Velasquez  [address]  [place] [address]       Dear Hermilo Velasquez,    You have requested a medication refill and our records indicate that you have not been seen by one of our providers for your annual office visit.  We sent you a letter with your last refill in February, 2019, informing you that you are overdue for an office visit and encouraging you to schedule an appointment.    For your safety, we cannot authorize further refills at this time until you schedule an office visit.     Please call our clinic at [phone] to schedule your appointment as soon as possible.    Thank you,    [facility]

## 2019-07-23 NOTE — TELEPHONE ENCOUNTER
"Last Written Prescription Date:  2/15/19  Last Fill Quantity: 90 tablet,  # refills: 0   Last office visit: 5/24/2019 with prescribing provider:  Edna   Future Office Visit:   Next 5 appointments (look out 90 days)    Aug 28, 2019  1:30 PM CDT  [facility] Visit with Karson Bishop MD  [facility] [address] [facility] [address] [phone]         Requested Prescriptions   Pending Prescriptions Disp Refills     atorvastatin (LIPITOR) 10 MG tablet 90 tablet 0     Sig: Take 1 tablet (10 mg) by mouth daily       Statins Protocol Failed - 7/23/2019 11:02 AM        Failed - LDL on file in past 12 months     Recent Labs   Lab Test 02/13/17  0956   LDL 69             Passed - No abnormal creatine kinase in past 12 months     No lab results found.             Passed - Recent (12 mo) or future (30 days) visit within the authorizing provider's specialty     Patient had office visit in the last 12 months or has a visit in the next 30 days with authorizing provider or within the authorizing provider's specialty.  See \"Patient Info\" tab in inbasket, or \"Choose Columns\" in Meds & Orders section of the refill encounter.              Passed - Medication is active on med list        Passed - Patient is age 18 or older          "

## 2019-07-23 NOTE — TELEPHONE ENCOUNTER
Previously Rx'd by cardiology - they have redirected to Dr Bishop as patient has not followed up with them.     Tran Cortes, RT (R)

## 2019-07-23 NOTE — TELEPHONE ENCOUNTER
Received fax from [facility] in [place] requesting refill on atorvastatin 10 mg tabs. Patient overdue for OV (as of 2/2019). Letter sent with last refill 2/2019.     Attempted to call patient, number on file is out of service.    At this time, refill is not authorized until patient schedules OV with cardiology. Letter sent and [facility] notified via return fax.   Yanira Cohen RN Care Coordinator

## 2019-07-24 DIAGNOSIS — E78.2 MIXED HYPERLIPIDEMIA: ICD-10-CM

## 2019-07-24 RX ORDER — ATORVASTATIN CALCIUM 10 MG/1
10 TABLET, FILM COATED ORAL DAILY
Qty: 90 TABLET | Refills: 0 | Status: SHIPPED | OUTPATIENT
Start: 2019-07-24 | End: 2019-07-24

## 2019-07-24 RX ORDER — ATORVASTATIN CALCIUM 10 MG/1
10 TABLET, FILM COATED ORAL DAILY
Qty: 30 TABLET | Refills: 0 | Status: CANCELLED | OUTPATIENT
Start: 2019-07-24

## 2019-07-24 RX ORDER — ATORVASTATIN CALCIUM 10 MG/1
10 TABLET, FILM COATED ORAL DAILY
Qty: 90 TABLET | Refills: 0 | Status: SHIPPED | OUTPATIENT
Start: 2019-07-24 | End: 2019-11-06

## 2019-07-24 NOTE — TELEPHONE ENCOUNTER
Routing refill request to provider for review/approval because:  Labs not current:    LDL Cholesterol Calculated   Date Value Ref Range Status   02/13/2017 69 <100 mg/dL Final     Comment:     Desirable:       <100 mg/dl     Evie VERA RN,BSN

## 2019-07-31 DIAGNOSIS — J44.9 CHRONIC OBSTRUCTIVE PULMONARY DISEASE, UNSPECIFIED COPD TYPE (H): ICD-10-CM

## 2019-07-31 NOTE — TELEPHONE ENCOUNTER
"tiotropium (SPIRIVA HANDIHALER) 18 MCG capsule 90 capsule 3 5/10/2018     Last Written Prescription Date:  5/10/2018  Last Fill Quantity: 90,  # refills: 3   Last office visit: 5/24/2019 with prescribing provider: REECE Bishop   Future Office Visit:   Next 5 appointments (look out 90 days)    Aug 28, 2019  1:30 PM CDT  Office Visit with Karson Bishop MD  [facility] ([facility] [address] [facility] [id number] [phone]         Requested Prescriptions   Pending Prescriptions Disp Refills     SPIRIVA HANDIHALER 18 MCG inhaled capsule [Pharmacy Med Name: Spiriva HandiHaler Inhalation Capsule 18 MCG] 30 capsule 2     Sig: Inhale 1 capsule (18 mcg) into the lungs daily       Asthma Maintenance Inhalers - Anticholinergics Passed - 7/31/2019 11:13 AM        Passed - Patient is age 12 years or older        Passed - Recent (12 mo) or future (30 days) visit within the authorizing provider's specialty     Patient had office visit in the last 12 months or has a visit in the next 30 days with authorizing provider or within the authorizing provider's specialty.  See \"Patient Info\" tab in inbasket, or \"Choose Columns\" in Meds & Orders section of the refill encounter.              Passed - Medication is active on med list         "

## 2019-08-01 RX ORDER — TIOTROPIUM BROMIDE 18 UG/1
CAPSULE ORAL; RESPIRATORY (INHALATION)
Qty: 90 CAPSULE | Refills: 2 | Status: SHIPPED | OUTPATIENT
Start: 2019-08-01 | End: 2019-11-06

## 2019-08-13 ENCOUNTER — ANTICOAGULATION THERAPY VISIT (OUTPATIENT)
Dept: NURSING | Facility: CLINIC | Age: 84
End: 2019-08-13
Payer: COMMERCIAL

## 2019-08-13 LAB — INR POINT OF CARE: 1.8 (ref 0.86–1.14)

## 2019-08-13 PROCEDURE — 36416 COLLJ CAPILLARY BLOOD SPEC: CPT

## 2019-08-13 PROCEDURE — 99207 ZZC NO CHARGE NURSE ONLY: CPT

## 2019-08-13 PROCEDURE — 85610 PROTHROMBIN TIME: CPT | Mod: QW

## 2019-08-13 NOTE — PROGRESS NOTES
ANTICOAGULATION FOLLOW-UP CLINIC VISIT    Patient Name:  Hermilo Velasquez  Date:  2019  Contact Type:  Face to Face    SUBJECTIVE:  Patient Findings     Positives:   Missed doses    Comments:   Pt states that he missed a dose on 19.         Clinical Outcomes     Comments:   Pt states that he missed a dose on 19.            OBJECTIVE    INR Protime   Date Value Ref Range Status   2019 1.8 (A) 0.86 - 1.14 Final       ASSESSMENT / PLAN  INR assessment SUB    Recheck INR In: 2 WEEKS    INR Location Clinic      Anticoagulation Summary  As of 2019    INR goal:   2.0-3.0   TTR:   47.7 % (3.4 y)   INR used for dosin.8! (2019)   Warfarin maintenance plan:   5 mg (5 mg x 1) every day   Full warfarin instructions:   : 7.5 mg; : 7.5 mg; Otherwise 5 mg every day   Weekly warfarin total:   35 mg   Weekly max warfarin dose:   45 mg   Plan last modified:   Mesha Bah RN (2019)   Next INR check:   2019   Target end date:   Indefinite    Indications    Long-term (current) use of anticoagulants [Z79.01] [Z79.01]  Pulmonary embolism  bilateral (H) [I26.99]  Paroxysmal atrial fibrillation (H) [I48.0]             Anticoagulation Episode Summary     INR check location:   Anticoagulation Clinic    Preferred lab:       Send INR reminders to:   TERRIE JACKSON    Comments:         Anticoagulation Care Providers     Provider Role Specialty Phone number    Karson Bishop MD [facility] Internal Medicine [phone]            See the Encounter Report to view Anticoagulation Flowsheet and Dosing Calendar (Go to Encounters tab in chart review, and find the Anticoagulation Therapy Visit)    Pt INR is 1.8 today. Pt reports that he missed a dose of warfarin on 19. Pt advised to take 7.5 mg today 19 and 7.5 mg tomorrow 19 then 5 mg daily. Pt will recheck INR on 19 before his appointment with  that day. Pt reports that his right knee  pain is better. He had a cortisone shot and wears a knee brace which helps. Pt denies any changes in health, diet, activity or medication. Hermilo aware if signs of clotting (pain, tenderness, swelling, color change in leg or arm, SOB) and bleeding occur (blood in stool, urine, large bruising, bleeding gums, nosebleeds) to have INR check sooner. If sx severe report to ER or concerned for stroke call 911. If general questions or concerns arise, call clinic.         Mesha Bah RN

## 2019-08-28 ENCOUNTER — OFFICE VISIT (OUTPATIENT)
Dept: FAMILY MEDICINE | Facility: CLINIC | Age: 84
End: 2019-08-28
Payer: COMMERCIAL

## 2019-08-28 ENCOUNTER — ANTICOAGULATION THERAPY VISIT (OUTPATIENT)
Dept: NURSING | Facility: CLINIC | Age: 84
End: 2019-08-28
Payer: COMMERCIAL

## 2019-08-28 VITALS
HEART RATE: 48 BPM | HEIGHT: 70 IN | WEIGHT: 166.4 LBS | OXYGEN SATURATION: 96 % | SYSTOLIC BLOOD PRESSURE: 153 MMHG | DIASTOLIC BLOOD PRESSURE: 68 MMHG | BODY MASS INDEX: 23.82 KG/M2 | TEMPERATURE: 97.6 F

## 2019-08-28 DIAGNOSIS — C34.90 MALIGNANT NEOPLASM OF LUNG, UNSPECIFIED LATERALITY, UNSPECIFIED PART OF LUNG (H): ICD-10-CM

## 2019-08-28 DIAGNOSIS — L29.9 CHRONIC PRURITUS: ICD-10-CM

## 2019-08-28 DIAGNOSIS — R42 VERTIGO: Primary | ICD-10-CM

## 2019-08-28 DIAGNOSIS — I26.99 PULMONARY EMBOLISM, BILATERAL (H): ICD-10-CM

## 2019-08-28 DIAGNOSIS — I48.0 PAROXYSMAL ATRIAL FIBRILLATION (H): ICD-10-CM

## 2019-08-28 DIAGNOSIS — Z79.01 LONG TERM CURRENT USE OF ANTICOAGULANT THERAPY: ICD-10-CM

## 2019-08-28 LAB — INR POINT OF CARE: 2.1 (ref 0.86–1.14)

## 2019-08-28 PROCEDURE — 99207 C PAF COMPLETED  NO CHARGE: CPT | Performed by: INTERNAL MEDICINE

## 2019-08-28 PROCEDURE — 85610 PROTHROMBIN TIME: CPT | Mod: QW

## 2019-08-28 PROCEDURE — 99214 OFFICE O/P EST MOD 30 MIN: CPT | Performed by: INTERNAL MEDICINE

## 2019-08-28 PROCEDURE — 99207 ZZC NO CHARGE NURSE ONLY: CPT

## 2019-08-28 PROCEDURE — 36416 COLLJ CAPILLARY BLOOD SPEC: CPT

## 2019-08-28 RX ORDER — HYDROXYZINE HYDROCHLORIDE 25 MG/1
50 TABLET, FILM COATED ORAL EVERY 6 HOURS PRN
Qty: 360 TABLET | Refills: 3 | Status: ON HOLD | OUTPATIENT
Start: 2019-08-28 | End: 2020-09-14

## 2019-08-28 ASSESSMENT — MIFFLIN-ST. JEOR: SCORE: 1441.04

## 2019-08-28 NOTE — PROGRESS NOTES
Subjective     Hermilo Velasquez is a 86 year old male who presents to clinic today for the following health issues:    HPI   Chief Complaint   Patient presents with     Recheck Medication      Pleasant 86-year-old man with multiple problems including lymphoma, lung cancer, osteoarthritis, hypertension, chronic pruritus, COPD, hyperlipidemia, gastroesophageal reflux disease, history of pulmonary embolism and atrial fibrillation.  He presents for routine follow-up of these conditions.  He feels well.  He did get a knee brace from his knee orthopedic surgeon which is helping a lot.  He needs a refill on his hydroxyzine that he takes multiple times per day to help with chronic pruritus for which she has seen multiple dermatologists.  He complains of a several week history of dizziness reminiscent of the dizziness that he experienced prior to visiting the [facility] in the past.  His past similar dizziness improved with canalith repositioning maneuvers that were performed there.  His current symptoms are moderate to severe and seem to improve when he takes meclizine.  Again, he underwent a partial pneumonectomy for stage I lung adenocarcinoma last spring.    Patient Active Problem List   Diagnosis     Low back pain     Ventral hernia     Nonrheumatic aortic valve stenosis     GERD (gastroesophageal reflux disease)     Non Hodgkin's lymphoma (H): 2013     Microscopic hematuria     Health Care Home     CARDIOVASCULAR SCREENING; LDL GOAL LESS THAN 130     History of colonic polyps     Neuropathy     Anemia due to blood loss, acute     Physical deconditioning     Paroxysmal atrial fibrillation (H)     Need for SBE (subacute bacterial endocarditis) prophylaxis     RBBB (right bundle branch block)     Gastrointestinal hemorrhage with melena     Primary osteoarthritis of both knees     Primary osteoarthritis of left hip     Pulmonary embolism, bilateral (H)     S/P IVC filter     Long-term (current)  use of anticoagulants [Z79.01]     Benign neoplasm of colon, unspecified part of colon     Pulmonary nodules     Benign neoplasm of colon     Primary osteoarthritis of left knee     Essential hypertension with goal blood pressure less than 140/90     Non-Hodgkin's lymphoma, unspecified body region, unspecified non-Hodgkin lymphoma type (H)     Anticoagulated on Coumadin     Other chronic pulmonary embolism without acute cor pulmonale (H)     Epistaxis     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     Aftercare following left knee joint replacement surgery     Rash     Drainage from wound: left Knee     Lower extremity edema     Leg edema, left     S/P total knee arthroplasty     ACP (advance care planning)     Hyperlipidemia LDL goal <130     Chronic obstructive pulmonary disease, unspecified COPD type (H)     Iron deficiency anemia due to chronic blood loss     Cervical pain     Neck stiffness     Spongiotic dermatitis     Nodule of lower lobe of right lung     Cellulitis     Past Surgical History:   Procedure Laterality Date     APPENDECTOMY       AS TOTAL KNEE ARTHROPLASTY       BACK SURGERY       ESOPHAGOSCOPY, GASTROSCOPY, DUODENOSCOPY (EGD), COMBINED N/A 11/28/2015    Procedure: COMBINED ESOPHAGOSCOPY, GASTROSCOPY, DUODENOSCOPY (EGD);  Surgeon: Danis Castillo MD;  Location:  GI     ESOPHAGOSCOPY, GASTROSCOPY, DUODENOSCOPY (EGD), COMBINED N/A 7/26/2018    Procedure: COMBINED ESOPHAGOSCOPY, GASTROSCOPY, DUODENOSCOPY (EGD);;  Surgeon: Toby Dong DO;  Location:  GI     HERNIA REPAIR  2006     HERNIORRHAPHY VENTRAL  4/17/2013    Procedure: HERNIORRHAPHY VENTRAL;  VENTRAL HERNIA REPAIR WITH MESH;  Surgeon: Patel Guzman MD;  Location:  OR     KNEE SURGERY      arthroscopic right knee surgery      LOBECTOMY LUNG Right 3/26/2019    Procedure: POSSIBLE LOBECTOMY LUNG;  Surgeon: Jose A Vasques MD;  Location:  OR     REPAIR LIGAMENT ANKLE  2/23/2012    Procedure:REPAIR  LIGAMENT ANKLE; LEFT TARSAL TUNNEL RELEASE OF KNOT OF ARIEL RELEASE; Surgeon:SAUL PUENTE; Location:[facility] OR     REPLACE VALVE AORTIC N/A 9/3/2015    Procedure: REPLACE VALVE AORTIC;  Surgeon: Antonino Mitchell MD;  Location: [facility] OR     ROTATOR CUFF REPAIR RT/LT      bilateral     SPINE SURGERY      3 spine surgeries     THORACOTOMY, WEDGE RESECTION LUNG, COMBINED Right 3/26/2019    Procedure: RIGHT THORACOTOMY, WEDGE RESECTION, RIGHT LOWER LOBE LUNG NODULE,;  Surgeon: Jose A Vasques MD;  Location: [facility] OR     TONSILLECTOMY       TURP         Social History     Tobacco Use     Smoking status: Former Smoker     Packs/day: 2.00     Years: 55.00     Pack years: 110.00     Last attempt to quit: 1998     Years since quittin.6     Smokeless tobacco: Never Used   Substance Use Topics     Alcohol use: Yes     Alcohol/week: 0.0 oz     Comment: 1 drink per day     Family History   Problem Relation Age of Onset     C.A.D. Father      Emphysema Father      Melanoma No family hx of      Skin Cancer No family hx of          Current Outpatient Medications   Medication Sig Dispense Refill     atorvastatin (LIPITOR) 10 MG tablet Take 1 tablet (10 mg) by mouth daily 90 tablet 0     ferrous sulfate (FEROSUL) 325 (65 Fe) MG tablet Take 1 tablet (325 mg) by mouth every other day 60 tablet 3     gabapentin (NEURONTIN) 300 MG capsule Take 300 mg by mouth 2 times daily       hydrOXYzine (ATARAX) 25 MG tablet Take 2 tablets (50 mg) by mouth every 6 hours as needed 360 tablet 3     metoprolol succinate (TOPROL-XL) 100 MG 24 hr tablet Take 0.5 tablets (50 mg) by mouth daily 90 tablet 3     ranitidine (ZANTAC) 150 MG tablet Take 150 mg by mouth daily       warfarin (COUMADIN) 5 MG tablet Take 1-1.5 tablets (5-7.5 mg) by mouth daily Or as directed by INR nurses 135 tablet 0     albuterol (PROAIR HFA/PROVENTIL HFA/VENTOLIN HFA) 108 (90 Base) MCG/ACT Inhaler Inhale 1-2 puffs into the lungs every 6 hours as needed  "for shortness of breath / dyspnea or wheezing (Patient not taking: Reported on 8/28/2019) 3 Inhaler 5     doxepin (SINEQUAN) 50 MG capsule Take 50 mg by mouth daily       HYDROcodone-acetaminophen (NORCO) 5-325 MG tablet Take 1 tablet by mouth every 6 hours as needed for moderate to severe pain (Patient not taking: Reported on 5/24/2019) 28 tablet 0     loratadine (CLARITIN) 10 MG tablet Take 10 mg by mouth daily as needed for allergies        order for DME Equipment being ordered: Right neoprene knee brace.  ( Fax to [facility] fax  [phone]) (Patient not taking: Reported on 8/28/2019) 1 each 0     pantoprazole (PROTONIX) 40 MG EC tablet Take 40 mg by mouth daily       SPIRIVA HANDIHALER 18 MCG inhaled capsule Inhale 1 capsule (18 mcg) into the lungs daily  (Patient not taking: Reported on 8/28/2019) 90 capsule 2     tiotropium (SPIRIVA HANDIHALER) 18 MCG capsule Inhale 1 capsule (18 mcg) into the lungs daily (Patient not taking: Reported on 8/28/2019) 90 capsule 3     triamcinolone (KENALOG) 0.1 % cream Apply topically 2 times daily as needed for irritation       Allergies   Allergen Reactions     Lidocaine Blisters     Allergy to lidocaine ointment     Omeprazole Itching     Pantoprazole Itching     Prevacid [Lansoprazole] Itching     Lasix [Furosemide] Rash     Penicillins Rash     \"broke out from injection\" 60 yrs ago         Reviewed and updated as needed this visit by Provider         Review of Systems   ROS COMP: Constitutional, HEENT, cardiovascular, pulmonary, gi and gu systems are negative, except as otherwise noted.      Objective    BP (!) 153/68 (BP Location: Right arm, Patient Position: Sitting, Cuff Size: Adult Regular)   Pulse (!) 48   Temp 97.6  F (36.4  C) (Oral)   Ht 1.778 m (5' 10\")   Wt 75.5 kg (166 lb 6.4 oz)   SpO2 96%   BMI 23.88 kg/m    Body mass index is 23.88 kg/m .  Physical Exam   General: This is an unchanged appearing, comfortable appearing elderly man in no acute " distress.  He fails to get up and go test, but he uses a cane, his Romberg test is negative, he has symmetric strength, cranial nerves II to XII appear grossly intact, no pronator drift, I did not perform a Clayton-Hallpike maneuver            Assessment & Plan     1. Vertigo  Because of his history of lung cancer, we decided to obtain an MRI of the brain to make sure that he does not have metastatic disease.  If PET scan is negative, [facility] maneuvers that were successful to treat similar symptoms in the past.  - NEUROLOGY ADULT REFERRAL  - MR Brain w/o & w Contrast; Future    2. Malignant neoplasm of lung, unspecified laterality, unspecified part of lung (H)      3. Chronic pruritus  Refill hydroxyzine  - hydrOXYzine (ATARAX) 25 MG tablet; Take 2 tablets (50 mg) by mouth every 6 hours as needed  Dispense: 360 tablet; Refill: 3           Return in about 3 months (around 11/28/2019) for recheck .  Or return sooner pending MRI scan findings or symptoms    Karson Bishop MD  [facility]

## 2019-08-28 NOTE — PROGRESS NOTES
ANTICOAGULATION FOLLOW-UP CLINIC VISIT    Patient Name:  Hermilo Velasquez  Date:  2019  Contact Type:  Face to Face    SUBJECTIVE:  Patient Findings     Comments:   The patient was assessed for diet, medication, and activity level changes, missed or extra doses, bruising or bleeding, with no problem findings.          Clinical Outcomes     Negatives:   Major bleeding event, Thromboembolic event, Anticoagulation-related hospital admission, Anticoagulation-related ED visit, Anticoagulation-related fatality    Comments:   The patient was assessed for diet, medication, and activity level changes, missed or extra doses, bruising or bleeding, with no problem findings.             OBJECTIVE    INR Protime   Date Value Ref Range Status   2019 2.1 (A) 0.86 - 1.14 Final       ASSESSMENT / PLAN  No question data found.  Anticoagulation Summary  As of 2019    INR goal:   2.0-3.0   TTR:   47.5 % (3.4 y)   INR used for dosin.1 (2019)   Warfarin maintenance plan:   5 mg (5 mg x 1) every day   Full warfarin instructions:   5 mg every day   Weekly warfarin total:   35 mg   Weekly max warfarin dose:   45 mg   Plan last modified:   Mesha Bah RN (2019)   Next INR check:   2019   Target end date:   Indefinite    Indications    Long-term (current) use of anticoagulants [Z79.01] [Z79.01]  Pulmonary embolism  bilateral (H) [I26.99]  Paroxysmal atrial fibrillation (H) [I48.0]             Anticoagulation Episode Summary     INR check location:   Anticoagulation Clinic    Preferred lab:       Send INR reminders to:   TERRIE JACKSON    Comments:         Anticoagulation Care Providers     Provider Role Specialty Phone number    Karson Bishop MD [facility] [phone]            See the Encounter Report to view Anticoagulation Flowsheet and Dosing Calendar (Go to Encounters tab in chart review, and find the Anticoagulation Therapy Visit)    Patient is having 2 teeth extracted  on 9/5/19 and dentist is requesting a copy of patients last 3 INR.  Results faxed to Dr. Rowe [phone].  Patient will contact [facility] if dentist requires any further directions with warfarin.      Tammy Peters RN

## 2019-08-30 ENCOUNTER — TRANSFERRED RECORDS (OUTPATIENT)
Dept: HEALTH INFORMATION MANAGEMENT | Facility: CLINIC | Age: 84
End: 2019-08-30

## 2019-09-12 ENCOUNTER — TELEPHONE (OUTPATIENT)
Dept: FAMILY MEDICINE | Facility: CLINIC | Age: 84
End: 2019-09-12

## 2019-09-12 ENCOUNTER — TRANSFERRED RECORDS (OUTPATIENT)
Dept: HEALTH INFORMATION MANAGEMENT | Facility: CLINIC | Age: 84
End: 2019-09-12

## 2019-09-12 NOTE — TELEPHONE ENCOUNTER
Reason for Call:  Request for results:    Name of test or procedure: MRI    Date of test of procedure: 8/30    Location of the test [facility]     OK to leave the result message on voice mail or with a family member? YES    Phone number Patient can be reached at:  Home number on file [phone] (home)    Additional comments: Please call with MRI results    Call taken on 9/12/2019 at 1:10 PM by Neha Darling

## 2019-09-12 NOTE — TELEPHONE ENCOUNTER
Can you call Chapito and tell him that his MRI looked fine.  No evidence of for cancer in the brain.  No evidence of for strokes or bleeds in the brain.  Normal brain MRI for a loren in his age group.  Nothing to explain his vertigo symptoms.  They are probably coming from his inner ear and not dangerous, just annoying.

## 2019-09-13 ENCOUNTER — ANTICOAGULATION THERAPY VISIT (OUTPATIENT)
Dept: NURSING | Facility: CLINIC | Age: 84
End: 2019-09-13
Payer: COMMERCIAL

## 2019-09-13 DIAGNOSIS — I48.0 PAROXYSMAL ATRIAL FIBRILLATION (H): ICD-10-CM

## 2019-09-13 DIAGNOSIS — Z79.01 LONG TERM CURRENT USE OF ANTICOAGULANT THERAPY: ICD-10-CM

## 2019-09-13 DIAGNOSIS — I26.99 PULMONARY EMBOLISM, BILATERAL (H): ICD-10-CM

## 2019-09-13 LAB — INR POINT OF CARE: 1.9 (ref 0.86–1.14)

## 2019-09-13 PROCEDURE — 36416 COLLJ CAPILLARY BLOOD SPEC: CPT

## 2019-09-13 PROCEDURE — 99207 ZZC NO CHARGE NURSE ONLY: CPT

## 2019-09-13 PROCEDURE — 85610 PROTHROMBIN TIME: CPT | Mod: QW

## 2019-09-13 NOTE — PROGRESS NOTES
ANTICOAGULATION FOLLOW-UP CLINIC VISIT    Patient Name:  Hermilo Velasquez  Date:  2019  Contact Type:  Face to Face    SUBJECTIVE:  Patient Findings     Comments:   Bleeding Signs/Symptoms: NO  Thromboembolic Signs/Symptoms: NO     Medication Changes:  NO  Dietary Changes:  NO  Bacterial/Viral Infection: NO     Missed Coumadin Doses: NO  Other Concerns:  NO          Clinical Outcomes     Negatives:   Major bleeding event, Thromboembolic event, Anticoagulation-related hospital admission, Anticoagulation-related ED visit, Anticoagulation-related fatality    Comments:   Bleeding Signs/Symptoms: NO  Thromboembolic Signs/Symptoms: NO     Medication Changes:  NO  Dietary Changes:  NO  Bacterial/Viral Infection: NO     Missed Coumadin Doses: NO  Other Concerns:  NO             OBJECTIVE    INR Protime   Date Value Ref Range Status   2019 1.9 (A) 0.86 - 1.14 Final       ASSESSMENT / PLAN  No question data found.  Anticoagulation Summary  As of 2019    INR goal:   2.0-3.0   TTR:   47.5 % (3.5 y)   INR used for dosin.9! (2019)   Warfarin maintenance plan:   5 mg (5 mg x 1) every day   Full warfarin instructions:   5 mg every day   Weekly warfarin total:   35 mg   Weekly max warfarin dose:   45 mg   Plan last modified:   Mesha Bah RN (2019)   Next INR check:   10/2/2019   Target end date:   Indefinite    Indications    Long-term (current) use of anticoagulants [Z79.01] [Z79.01]  Pulmonary embolism  bilateral (H) [I26.99]  Paroxysmal atrial fibrillation (H) [I48.0]             Anticoagulation Episode Summary     INR check location:   Anticoagulation Clinic    Preferred lab:       Send INR reminders to:   TERRIE JACKSON    Comments:         Anticoagulation Care Providers     Provider Role Specialty Phone number    Karson Bishop MD [facility] [phone]            See the Encounter Report to view Anticoagulation Flowsheet and Dosing Calendar (Go to Encounters tab in  chart review, and find the Anticoagulation Therapy Visit)        Cris Sharif RN

## 2019-09-13 NOTE — PATIENT INSTRUCTIONS
Anticoagulation Clinic:  Please call [phone] with any problems or questions regarding your Warfarin therapy.

## 2019-09-23 PROBLEM — M54.2 CERVICAL PAIN: Status: RESOLVED | Noted: 2018-12-26 | Resolved: 2019-09-23

## 2019-09-23 PROBLEM — M43.6 NECK STIFFNESS: Status: RESOLVED | Noted: 2018-12-26 | Resolved: 2019-09-23

## 2019-09-26 DIAGNOSIS — K21.9 GASTROESOPHAGEAL REFLUX DISEASE WITHOUT ESOPHAGITIS: ICD-10-CM

## 2019-09-26 NOTE — TELEPHONE ENCOUNTER
"ranitidine (ZANTAC) 150 MG tablet    Last Written Prescription Date:  Unknown  Last Fill Quantity: Unknown,  # refills: Unknown    Last office visit: 8/28/2019 with prescribing provider: REECE Bishop    Future Office Visit:   Next 5 appointments (look out 90 days)    Nov 06, 2019  9:45 AM CST  Return Visit with Tab Grijalva MD  [facility] ([facility]) [address]  [facility] [address] [phone] OPT 2   Nov 29, 2019  1:30 PM CST  Office Visit with Karson Bishop MD  [facility] [address] [id number] [phone]         Requested Prescriptions   Pending Prescriptions Disp Refills     ranitidine (ZANTAC) 150 MG tablet [Pharmacy Med Name: RANITIDINE TABS 150MG] 180 tablet 4     Sig: TAKE 1 TABLET TWICE A DAY       H2 Blockers Protocol Passed - 9/26/2019 12:41 PM        Passed - Patient is age 12 or older        Passed - Recent (12 mo) or future (30 days) visit within the authorizing provider's specialty     Patient had office visit in the last 12 months or has a visit in the next 30 days with authorizing provider or within the authorizing provider's specialty.  See \"Patient Info\" tab in inbasket, or \"Choose Columns\" in Meds & Orders section of the refill encounter.              Passed - Medication is active on med list          "

## 2019-09-27 NOTE — TELEPHONE ENCOUNTER
Routing refill request to provider for review/approval because:    Last Rx 8/13/18, #180 with 1 refill - 1 tablet BID     Rx is now historical on med list as 150mg every day     Yamilex EVERETT RN

## 2019-09-30 DIAGNOSIS — K21.9 GASTROESOPHAGEAL REFLUX DISEASE WITHOUT ESOPHAGITIS: ICD-10-CM

## 2019-10-01 ENCOUNTER — ANCILLARY PROCEDURE (OUTPATIENT)
Dept: GENERAL RADIOLOGY | Facility: CLINIC | Age: 84
End: 2019-10-01
Attending: INTERNAL MEDICINE
Payer: COMMERCIAL

## 2019-10-01 ENCOUNTER — OFFICE VISIT (OUTPATIENT)
Dept: FAMILY MEDICINE | Facility: CLINIC | Age: 84
End: 2019-10-01
Payer: COMMERCIAL

## 2019-10-01 ENCOUNTER — ANTICOAGULATION THERAPY VISIT (OUTPATIENT)
Dept: NURSING | Facility: CLINIC | Age: 84
End: 2019-10-01
Payer: COMMERCIAL

## 2019-10-01 VITALS
DIASTOLIC BLOOD PRESSURE: 78 MMHG | TEMPERATURE: 97 F | WEIGHT: 166 LBS | SYSTOLIC BLOOD PRESSURE: 143 MMHG | HEART RATE: 68 BPM | BODY MASS INDEX: 23.77 KG/M2 | OXYGEN SATURATION: 97 % | HEIGHT: 70 IN
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
## 2019-10-01 DIAGNOSIS — S49.92XA INJURY OF LEFT SHOULDER, INITIAL ENCOUNTER: ICD-10-CM

## 2019-10-01 DIAGNOSIS — R07.81 RIB PAIN ON LEFT SIDE: ICD-10-CM

## 2019-10-01 DIAGNOSIS — M25.522 LEFT ELBOW PAIN: ICD-10-CM

## 2019-10-01 DIAGNOSIS — I26.99 PULMONARY EMBOLISM, BILATERAL (H): ICD-10-CM

## 2019-10-01 DIAGNOSIS — Z79.01 LONG TERM CURRENT USE OF ANTICOAGULANT THERAPY: ICD-10-CM

## 2019-10-01 DIAGNOSIS — I48.0 PAROXYSMAL ATRIAL FIBRILLATION (H): ICD-10-CM
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
## 2019-10-01 LAB — INR POINT OF CARE: 2.1 (ref 0.86–1.14)

## 2019-10-01 PROCEDURE — 36416 COLLJ CAPILLARY BLOOD SPEC: CPT

## 2019-10-01 PROCEDURE — 99207 ZZC NO CHARGE NURSE ONLY: CPT

## 2019-10-01 PROCEDURE — 71101 X-RAY EXAM UNILAT RIBS/CHEST: CPT | Mod: LT

## 2019-10-01 PROCEDURE — 85610 PROTHROMBIN TIME: CPT | Mod: QW

## 2019-10-01 PROCEDURE — 73070 X-RAY EXAM OF ELBOW: CPT | Mod: LT

## 2019-10-01 PROCEDURE — 73030 X-RAY EXAM OF SHOULDER: CPT | Mod: LT

## 2019-10-01 PROCEDURE — 99214 OFFICE O/P EST MOD 30 MIN: CPT | Performed by: INTERNAL MEDICINE

## 2019-10-01 RX ORDER — HYDROCODONE BITARTRATE AND ACETAMINOPHEN 5; 325 MG/1; MG/1
1 TABLET ORAL EVERY 6 HOURS PRN
Qty: 18 TABLET | Refills: 0 | Status: SHIPPED | OUTPATIENT
Start: 2019-10-01 | End: 2019-10-07

## 2019-10-01 ASSESSMENT — ENCOUNTER SYMPTOMS
VOMITING: 0
LIGHT-HEADEDNESS: 0
ABDOMINAL PAIN: 0
NUMBNESS: 0
SHORTNESS OF BREATH: 0
DIZZINESS: 0
NAUSEA: 0
PALPITATIONS: 0
NECK PAIN: 0
BACK PAIN: 0
WEAKNESS: 0
HEADACHES: 0

## 2019-10-01 ASSESSMENT — MIFFLIN-ST. JEOR: SCORE: 1439.22

## 2019-10-01 NOTE — PROGRESS NOTES
"Subjective     Hermilo Velasquez is a 86 year old male who presents to clinic today for the following health issues:    HPI     4 days prior to consultation, the patient was at the patio at his home when he felt his \"knee give way\" and he subsequently fell on his left side hitting his left shoulder, elbow, and chest on the concrete.  He did not hit his head.  There was no loss of consciousness.  Patient subsequently developed pain at the left shoulder, elbow, and left lower ribs.  He rates the pain as a 7/10 at rest and can be close to 10/10 with certain movements/positions.  The pain disrupts his sleep.  Has tried over-the-counter Tylenol without sufficient relief; he has had a prescription for Norco in the past and it was reportedly effective and was tolerated.  Denies numbness or tingling at the left arm.  No severe swelling at the affected areas.  Denies syncope or lightheadedness.  He denies shortness of breath other than the chronic shortness of breath that he has from his COPD.  He is on anticoagulation treatment with warfarin for history of pulmonary embolism.      Past Medical History:   Diagnosis Date     Aortic stenosis     Severe AS, 9/2015 AVR - [facility] TRIFECTA Bovine bioprosthesis 25MM TF-25A     Atrial fibrillation (H)     9/2015 Paroxysmal post op Afib - discharged on Warfarin and a beta blocker     Deep vein thrombosis (H)      GERD (gastroesophageal reflux disease)      Heart murmur      Monoclonal gammopathy     plasmacyte prominent causing monoclonal gammopathy     Need for SBE (subacute bacterial endocarditis) prophylaxis      Neuropathy      Other and unspecified hyperlipidemia      Other malignant lymphomas     non hodgkin's lymphoma     RBBB (right bundle branch block)      Severe sepsis with acute organ dysfunction (H) 11/16/2015     Unspecified hereditary and idiopathic peripheral neuropathy        Review of Systems   Respiratory: Negative for shortness of breath.    Cardiovascular: " "Negative for palpitations.   Gastrointestinal: Negative for abdominal pain, nausea and vomiting.   Musculoskeletal: Negative for back pain and neck pain.   Neurological: Negative for dizziness, syncope, weakness, light-headedness, numbness and headaches.       BP (!) 143/78   Pulse 68   Temp 97  F (36.1  C) (Oral)   Ht 1.778 m (5' 10\")   Wt 75.3 kg (166 lb)   SpO2 97%   BMI 23.82 kg/m        Physical Exam  Vitals signs reviewed.   Constitutional:       General: He is not in acute distress.  HENT:      Head: Atraumatic.   Neck:      Musculoskeletal: Normal range of motion and neck supple.   Cardiovascular:      Rate and Rhythm: Normal rate and regular rhythm.      Heart sounds: Normal heart sounds.   Pulmonary:      Effort: Pulmonary effort is normal. No respiratory distress.      Comments: Bilateral tight air entry but otherwise normal breath sounds  Abdominal:      Palpations: Abdomen is soft.      Tenderness: There is no tenderness.   Musculoskeletal:         General: Tenderness (On palpation of posterior lateral aspect of left shoulder, point of left elbow, and left lower ribs more towards the lateral side ) present. No swelling or deformity.   Skin:     Comments: Small uninfected skin tear at point of left elbow   Neurological:      Mental Status: He is alert and oriented to person, place, and time.      Sensory: No sensory deficit.      Motor: No weakness.      Coordination: Coordination normal.   Psychiatric:         Mood and Affect: Mood normal.         Behavior: Behavior normal.         ICD-10-CM    1. Fall, initial encounter W19.XXXA XR Shoulder Left G/E 3 Views     XR Ribs & Chest Left G/E 3 Views     XR Elbow Left 2 Views   2. Injury of left shoulder, initial encounter S49.92XA XR Shoulder Left G/E 3 Views     order for [facility] [place]     ORTHOPEDICS ADULT REFERRAL     HYDROcodone-acetaminophen (NORCO) 5-325 MG tablet   3. Rib pain on left side R07.81 XR Ribs & Chest Left G/E 3 Views     " HYDROcodone-acetaminophen (NORCO) 5-325 MG tablet   4. Left elbow pain M25.522 XR Elbow Left 2 Views     ORTHOPEDICS ADULT REFERRAL     HYDROcodone-acetaminophen (NORCO) 5-325 MG tablet       Patient Instructions   May take Norco as needed for severe pain.  CAUTION: may cause sedation and constipation.  Call doctor if you develop any side effects from the medication.    Wear shoulder sling as directed.    Follow-up with Dr. Azul (orthopedics).    Seek immediate medical attention if you develop worsening pain or numbness/weakness at the left arm.

## 2019-10-01 NOTE — TELEPHONE ENCOUNTER
"ranitidine (ZANTAC) 150 MG tablet     --   Sig - Route: Take 150 mg by mouth daily - Oral   Class: Historical     Last Written Prescription Date:  Uncertain  Last Fill Quantity: ?,  # refills: ?   Last office visit: 8/28/2019 with prescribing provider:  Edna   Future Office Visit:   Next 5 appointments (look out 90 days)    Nov 06, 2019  9:45 AM CST  Return Visit with Tab Grijalva MD  [facility] ([facility]) [address] [id number] [phone] OPT 2   Nov 29, 2019  1:30 PM CST  Office Visit with Karson Bishop MD  [facility] ([facility]) [address] [id number] [phone]         HISTORICAL    Requested Prescriptions   Pending Prescriptions Disp Refills     ranitidine (ZANTAC) 300 MG tablet [Pharmacy Med Name: raNITIdine HCl Oral Tablet 300 MG] 180 tablet 2     Sig: Take 1 tablet (300 mg) by mouth 2 times daily       H2 Blockers Protocol Passed - 9/30/2019  5:14 PM        Passed - Patient is age 12 or older        Passed - Recent (12 mo) or future (30 days) visit within the authorizing provider's specialty     Patient has had an office visit with the authorizing provider or a provider within the authorizing providers department within the previous 12 mos or has a future within next 30 days. See \"Patient Info\" tab in inbasket, or \"Choose Columns\" in Meds & Orders section of the refill encounter.              Passed - Medication is active on med list        No flowsheet data found.    "

## 2019-10-01 NOTE — PROGRESS NOTES
ANTICOAGULATION FOLLOW-UP CLINIC VISIT    Patient Name:  Hermilo Velasquez  Date:  10/1/2019  Contact Type:  Face to Face    SUBJECTIVE:  Patient Findings     Comments:   Patient fell 4 days ago.         Clinical Outcomes     Negatives:   Major bleeding event, Thromboembolic event, Anticoagulation-related hospital admission, Anticoagulation-related ED visit, Anticoagulation-related fatality    Comments:   Patient fell 4 days ago.            OBJECTIVE    INR Protime   Date Value Ref Range Status   10/01/2019 2.1 (A) 0.86 - 1.14 Final       ASSESSMENT / PLAN  No question data found.  Anticoagulation Summary  As of 10/1/2019    INR goal:   2.0-3.0   TTR:   47.6 % (3.5 y)   INR used for dosin.1 (10/1/2019)   Warfarin maintenance plan:   5 mg (5 mg x 1) every day   Full warfarin instructions:   5 mg every day   Weekly warfarin total:   35 mg   Weekly max warfarin dose:   45 mg   No change documented:   Cris Sharif RN   Plan last modified:   Mesha Bah RN (2019)   Next INR check:   10/29/2019   Target end date:   Indefinite    Indications    Long-term (current) use of anticoagulants [Z79.01] [Z79.01]  Pulmonary embolism  bilateral (H) [I26.99]  Paroxysmal atrial fibrillation (H) [I48.0]             Anticoagulation Episode Summary     INR check location:   Anticoagulation Clinic    Preferred lab:       Send INR reminders to:   TERRIE JACKSON    Comments:         Anticoagulation Care Providers     Provider Role Specialty Phone number    Karson Bishop MD [facility] Internal Medicine [phone]            See the Encounter Report to view Anticoagulation Flowsheet and Dosing Calendar (Go to Encounters tab in chart review, and find the Anticoagulation Therapy Visit)        Cris Sharif RN

## 2019-10-01 NOTE — PATIENT INSTRUCTIONS
Anticoagulation Clinic:  Please call [phone] with any problems or questions regarding your Warfarin therapy.

## 2019-10-01 NOTE — PATIENT INSTRUCTIONS
May take Norco as needed for severe pain.  CAUTION: may cause sedation and constipation.  Call doctor if you develop any side effects from the medication.    Wear shoulder sling as directed.    Follow-up with Dr. Azul (orthopedics).    Seek immediate medical attention if you develop worsening pain or numbness/weakness at the left arm.

## 2019-10-07 ENCOUNTER — TELEPHONE (OUTPATIENT)
Dept: FAMILY MEDICINE | Facility: CLINIC | Age: 84
End: 2019-10-07

## 2019-10-07 DIAGNOSIS — S49.92XA INJURY OF LEFT SHOULDER, INITIAL ENCOUNTER: ICD-10-CM

## 2019-10-07 DIAGNOSIS — R07.81 RIB PAIN ON LEFT SIDE: ICD-10-CM

## 2019-10-07 DIAGNOSIS — M25.522 LEFT ELBOW PAIN: ICD-10-CM

## 2019-10-07 RX ORDER — HYDROCODONE BITARTRATE AND ACETAMINOPHEN 5; 325 MG/1; MG/1
1 TABLET ORAL EVERY 6 HOURS PRN
Qty: 18 TABLET | Refills: 0 | Status: ON HOLD | OUTPATIENT
Start: 2019-10-07 | End: 2020-08-15

## 2019-10-07 NOTE — TELEPHONE ENCOUNTER
"TO PCP:     Pt saw Dr. Dennis on 10/1/19 post-fall, was given #18 [place] tabs     States his shoulder pain is much better, but still having quite a bit of rib pain, went through all the Norco tabs     APAP does \"nothing\" - can't taking other OTC pain meds due to warfarin therapy     Iced area yesterday, didn't help. Discussed using warmth. Pt has a hot pad he can try     Pt asking if PCP has any recommendations for medication he can take other than the APAP?     Please advise   Yamilex EVERETT RN    "

## 2019-10-07 NOTE — TELEPHONE ENCOUNTER
Reason for Call:  Other call back    Detailed comments:   Pt took a fall about two weeks ago.  He is on coumadin and he is experiencing rib pain.  He is inquiring about what can be used to relieve the rib pain over the counter.      Phone Number Patient can be reached at: Home number on file [phone] (home)    Best Time: Any     Call taken on 10/7/2019 at 10:22 AM by Jeannette Brunson

## 2019-10-11 ENCOUNTER — TELEPHONE (OUTPATIENT)
Dept: FAMILY MEDICINE | Facility: CLINIC | Age: 84
End: 2019-10-11

## 2019-10-11 ENCOUNTER — OFFICE VISIT (OUTPATIENT)
Dept: FAMILY MEDICINE | Facility: CLINIC | Age: 84
End: 2019-10-11
Payer: COMMERCIAL

## 2019-10-11 VITALS
HEIGHT: 70 IN | TEMPERATURE: 96.8 F | HEART RATE: 65 BPM | WEIGHT: 167.4 LBS | BODY MASS INDEX: 23.96 KG/M2 | OXYGEN SATURATION: 98 % | DIASTOLIC BLOOD PRESSURE: 76 MMHG | SYSTOLIC BLOOD PRESSURE: 133 MMHG

## 2019-10-11 DIAGNOSIS — Z23 NEED FOR PROPHYLACTIC VACCINATION AND INOCULATION AGAINST INFLUENZA: ICD-10-CM

## 2019-10-11 DIAGNOSIS — J44.9 CHRONIC OBSTRUCTIVE PULMONARY DISEASE, UNSPECIFIED COPD TYPE (H): Primary | ICD-10-CM

## 2019-10-11 DIAGNOSIS — S93.402A MILD SPRAIN OF LEFT ANKLE, INITIAL ENCOUNTER: ICD-10-CM

## 2019-10-11 PROCEDURE — 90662 IIV NO PRSV INCREASED AG IM: CPT | Performed by: NURSE PRACTITIONER

## 2019-10-11 PROCEDURE — 99213 OFFICE O/P EST LOW 20 MIN: CPT | Mod: 25 | Performed by: NURSE PRACTITIONER

## 2019-10-11 PROCEDURE — G0008 ADMIN INFLUENZA VIRUS VAC: HCPCS | Performed by: NURSE PRACTITIONER

## 2019-10-11 RX ORDER — TIOTROPIUM BROMIDE 18 UG/1
18 CAPSULE ORAL; RESPIRATORY (INHALATION) DAILY
Qty: 1 CAPSULE | Refills: 3 | Status: SHIPPED | OUTPATIENT
Start: 2019-10-11 | End: 2019-11-06

## 2019-10-11 RX ORDER — TIOTROPIUM BROMIDE 18 UG/1
18 CAPSULE ORAL; RESPIRATORY (INHALATION) DAILY
Qty: 90 CAPSULE | Refills: 3 | Status: SHIPPED | OUTPATIENT
Start: 2019-10-11 | End: 2019-11-06

## 2019-10-11 ASSESSMENT — MIFFLIN-ST. JEOR: SCORE: 1445.57

## 2019-10-11 NOTE — TELEPHONE ENCOUNTER
"WORK-IN APPT TODAY with PCP ?  No TEAM appointments available in clinic today.         Symptoms:  Swollen. \" left ankle (foot and lower end of calf also).  Slight redness.  Difficulty wearing shoes---\"feels tight on top of foot\".    Denies fever.      Onset:  3 days.     Patient takes Warfarin for A Fib and Hx of PE  Last INR 2.1 on 10-1-19.     All Uric Acid levels in epic are within normal limits.      Please advise.     Evie VERA RN,BSN        "

## 2019-10-11 NOTE — TELEPHONE ENCOUNTER
Reason for call:  Patient reporting a symptom    Symptom or request: swollen ankle     Duration (how long have symptoms been present): 3 days     Have you been treated for this before? Yes    Additional comments: Pt has had this issue before and took prednisone. Pt would like to know if he should take it again for this    Phone Number patient can be reached at:  Home number on file [phone] (home)    Best Time:  Any     Can we leave a detailed message on this number:  YES    Call taken on 10/11/2019 at 10:02 AM by Arun So

## 2019-10-11 NOTE — PROGRESS NOTES
Subjective     Hermilo Velasquez is a 86 year old male who presents to clinic today for the following health issues:    HPI   Musculoskeletal problem/pain      Duration: 3 days    Description  Location: left ankle swelling but no pain - does have neuropathy, may have twisted it 2 weeks ago ;     Intensity:  moderate    Accompanying signs and symptoms: swelling    History  Previous similar problem: YES  Previous evaluation:  x-ray    Precipitating or alleviating factors: fell on 9/26 because the right knee gave out and he sustained left sided injuries including ribs and flank and twisted the ankle; was given rx for norco #18 for the pain of the ribs and flank    Is on coumadin  Trauma or overuse: YES- has been out doing some painting and on his feet a lot   Aggravating factors include: walking    Therapies tried and outcome: ice    Patient Active Problem List   Diagnosis     Ventral hernia     Nonrheumatic aortic valve stenosis     GERD (gastroesophageal reflux disease)     Non Hodgkin's lymphoma (H): 2013     Microscopic hematuria     Health Care Home     CARDIOVASCULAR SCREENING; LDL GOAL LESS THAN 130     History of colonic polyps     Neuropathy     Anemia due to blood loss, acute     Physical deconditioning     Paroxysmal atrial fibrillation (H)     Need for SBE (subacute bacterial endocarditis) prophylaxis     RBBB (right bundle branch block)     Gastrointestinal hemorrhage with melena     Primary osteoarthritis of both knees     Primary osteoarthritis of left hip     Pulmonary embolism, bilateral (H)     S/P IVC filter     Long-term (current) use of anticoagulants [Z79.01]     Benign neoplasm of colon, unspecified part of colon     Pulmonary nodules     Benign neoplasm of colon     Primary osteoarthritis of left knee     Essential hypertension with goal blood pressure less than 140/90     Non-Hodgkin's lymphoma, unspecified body region, unspecified non-Hodgkin lymphoma type (H)     Anticoagulated on Coumadin      Other chronic pulmonary embolism without acute cor pulmonale (H)     Epistaxis     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     Aftercare following left knee joint replacement surgery     Rash     Drainage from wound: left Knee     Lower extremity edema     Leg edema, left     S/P total knee arthroplasty     ACP (advance care planning)     Hyperlipidemia LDL goal <130     Chronic obstructive pulmonary disease, unspecified COPD type (H)     Iron deficiency anemia due to chronic blood loss     Spongiotic dermatitis     Nodule of lower lobe of right lung     Cellulitis     Past Surgical History:   Procedure Laterality Date     APPENDECTOMY       AS TOTAL KNEE ARTHROPLASTY       BACK SURGERY       ESOPHAGOSCOPY, GASTROSCOPY, DUODENOSCOPY (EGD), COMBINED N/A 11/28/2015    Procedure: COMBINED ESOPHAGOSCOPY, GASTROSCOPY, DUODENOSCOPY (EGD);  Surgeon: Danis Castillo MD;  Location:  GI     ESOPHAGOSCOPY, GASTROSCOPY, DUODENOSCOPY (EGD), COMBINED N/A 7/26/2018    Procedure: COMBINED ESOPHAGOSCOPY, GASTROSCOPY, DUODENOSCOPY (EGD);;  Surgeon: Toby Dong DO;  Location:  GI     HERNIA REPAIR  2006     HERNIORRHAPHY VENTRAL  4/17/2013    Procedure: HERNIORRHAPHY VENTRAL;  VENTRAL HERNIA REPAIR WITH MESH;  Surgeon: Patel Guzman MD;  Location:  OR     KNEE SURGERY      arthroscopic right knee surgery      LOBECTOMY LUNG Right 3/26/2019    Procedure: POSSIBLE LOBECTOMY LUNG;  Surgeon: Jose A Vasques MD;  Location:  OR     REPAIR LIGAMENT ANKLE  2/23/2012    Procedure:REPAIR LIGAMENT ANKLE; LEFT TARSAL TUNNEL RELEASE OF KNOT OF ARIEL RELEASE; Surgeon:SAUL PUENTE; Location: OR     REPLACE VALVE AORTIC N/A 9/3/2015    Procedure: REPLACE VALVE AORTIC;  Surgeon: Antonino Mitchell MD;  Location:  OR     ROTATOR CUFF REPAIR RT/LT      bilateral     SPINE SURGERY      3 spine surgeries     THORACOTOMY, WEDGE RESECTION LUNG, COMBINED Right 3/26/2019    Procedure:  RIGHT THORACOTOMY, WEDGE RESECTION, RIGHT LOWER LOBE LUNG NODULE,;  Surgeon: Jose A Vasques MD;  Location: [facility] OR     TONSILLECTOMY       TURP         Social History     Tobacco Use     Smoking status: Former Smoker     Packs/day: 2.00     Years: 55.00     Pack years: 110.00     Last attempt to quit: 1998     Years since quittin.7     Smokeless tobacco: Never Used   Substance Use Topics     Alcohol use: Yes     Alcohol/week: 0.0 standard drinks     Comment: 1 drink per day     Family History   Problem Relation Age of Onset     C.A.D. Father      Emphysema Father      Melanoma No family hx of      Skin Cancer No family hx of          Current Outpatient Medications   Medication Sig Dispense Refill     albuterol (PROAIR HFA/PROVENTIL HFA/VENTOLIN HFA) 108 (90 Base) MCG/ACT Inhaler Inhale 1-2 puffs into the lungs every 6 hours as needed for shortness of breath / dyspnea or wheezing 3 Inhaler 5     atorvastatin (LIPITOR) 10 MG tablet Take 1 tablet (10 mg) by mouth daily 90 tablet 0     doxepin (SINEQUAN) 50 MG capsule Take 50 mg by mouth daily       ferrous sulfate (FEROSUL) 325 (65 Fe) MG tablet Take 1 tablet (325 mg) by mouth every other day 60 tablet 3     gabapentin (NEURONTIN) 300 MG capsule Take 300 mg by mouth 2 times daily       HYDROcodone-acetaminophen (NORCO) 5-325 MG tablet Take 1 tablet by mouth every 6 hours as needed for pain 18 tablet 0     hydrOXYzine (ATARAX) 25 MG tablet Take 2 tablets (50 mg) by mouth every 6 hours as needed 360 tablet 3     loratadine (CLARITIN) 10 MG tablet Take 10 mg by mouth daily as needed for allergies        metoprolol succinate (TOPROL-XL) 100 MG 24 hr tablet Take 0.5 tablets (50 mg) by mouth daily 90 tablet 3     order for DME Equipment being ordered: shoulder sling, use as directed 1 Device 0     order for DME Equipment being ordered: Right neoprene knee brace.  ( Fax to [facility] fax  [phone]) 1 each 0     pantoprazole (PROTONIX) 40 MG EC tablet  "Take 40 mg by mouth daily       ranitidine (ZANTAC) 150 MG tablet Take 1 tablet (150 mg) by mouth daily 90 tablet 3     ranitidine (ZANTAC) 150 MG tablet Take 150 mg by mouth daily       SPIRIVA HANDIHALER 18 MCG inhaled capsule Inhale 1 capsule (18 mcg) into the lungs daily  90 capsule 2     tiotropium (SPIRIVA HANDIHALER) 18 MCG capsule Inhale 1 capsule (18 mcg) into the lungs daily 90 capsule 3     tiotropium (SPIRIVA) 18 MCG inhaled capsule Inhale 1 capsule (18 mcg) into the lungs daily 1 capsule 3     tiotropium (SPIRIVA) 18 MCG inhaled capsule Inhale 1 capsule (18 mcg) into the lungs daily 90 capsule 3     triamcinolone (KENALOG) 0.1 % cream Apply topically 2 times daily as needed for irritation       warfarin (COUMADIN) 5 MG tablet Take 1-1.5 tablets (5-7.5 mg) by mouth daily Or as directed by INR nurses 135 tablet 0     Allergies   Allergen Reactions     Lidocaine Blisters     Allergy to lidocaine ointment     Omeprazole Itching     Pantoprazole Itching     Prevacid [Lansoprazole] Itching     Lasix [Furosemide] Rash     Penicillins Rash     \"broke out from injection\" 60 yrs ago         Reviewed and updated as needed this visit by Provider  Allergies         Review of Systems   ROS COMP: Constitutional, HEENT, cardiovascular, pulmonary, gi and gu systems are negative, except as otherwise noted.      Objective    /76 (BP Location: Left arm, Patient Position: Sitting, Cuff Size: Adult Regular)   Pulse 65   Temp 96.8  F (36  C) (Oral)   Ht 1.778 m (5' 10\")   Wt 75.9 kg (167 lb 6.4 oz)   SpO2 98%   BMI 24.02 kg/m      Physical Exam   GENERAL:  alert and no distress  EYES: Eyes grossly normal to inspection, PERRL and conjunctivae and sclerae normal  RESP: lungs; very little air movement in lower 2/3 of lungs  CV: regular rate and rhythm, normal S1 S2, no S3 or S4, no murmur, click or rub, no significant peripheral edema, feet cool and cyanotic  ABDOMEN: soft, nontender, no hepatosplenomegaly, no " masses and bowel sounds normal  MS: no gross musculoskeletal defects noted, no edema, mild lateral ankle swelling without discoloration , even tandem gait with full weight bearing, no calf tenderness   SKIN: no suspicious rashes, ecchymosis of left lateral flank with mild tenderness  PSYCH: mentation appears normal, affect normal/bright    Diagnostic Test Results:  Labs reviewed in Epic        Assessment & Plan       ICD-10-CM    1. Chronic obstructive pulmonary disease, unspecified COPD type (H) J44.9 tiotropium (SPIRIVA) 18 MCG inhaled capsule     tiotropium (SPIRIVA) 18 MCG inhaled capsule   2. Need for prophylactic vaccination and inoculation against influenza Z23 INFLUENZA (HIGH DOSE) 3 VALENT VACCINE [56622]     Vaccine Administration, Initial [87723]     ADMIN INFLUENZA (For MEDICARE Patients ONLY) []   3. Mild sprain of left ankle, initial encounter S93.402A      ILYA Ceja [facility] ([facility]

## 2019-10-12 DIAGNOSIS — K21.9 GASTROESOPHAGEAL REFLUX DISEASE WITHOUT ESOPHAGITIS: ICD-10-CM

## 2019-10-12 NOTE — TELEPHONE ENCOUNTER
"Last Written Prescription Date:  9/27/19  Last Fill Quantity: 90 tablet,  # refills: 3   Last office visit: 8/28/2019 with prescribing provider:  Edna   Future Office Visit:   Next 5 appointments (look out 90 days)    Nov 06, 2019  9:45 AM CST  Return Visit with Tab Grijalva MD  [facility] ([facility]) [address]  [place] [id number] [phone] OPT 2   Nov 29, 2019  1:30 PM CST  Office Visit with Karson Bishop MD  [facility] ([facility]) [address] [id number] [phone]         Requested Prescriptions   Pending Prescriptions Disp Refills     ranitidine (ZANTAC) 300 MG tablet [Pharmacy Med Name: raNITIdine HCl Oral Tablet 300 MG] 180 tablet 2     Sig: Take 1 tablet (300 mg) by mouth 2 times daily       H2 Blockers Protocol Passed - 10/12/2019 11:40 AM        Passed - Patient is age 12 or older        Passed - Recent (12 mo) or future (30 days) visit within the authorizing provider's specialty     Patient has had an office visit with the authorizing provider or a provider within the authorizing providers department within the previous 12 mos or has a future within next 30 days. See \"Patient Info\" tab in inbasket, or \"Choose Columns\" in Meds & Orders section of the refill encounter.              Passed - Medication is active on med list          "

## 2019-10-25 DIAGNOSIS — L29.9 PRURITUS: ICD-10-CM

## 2019-10-28 NOTE — TELEPHONE ENCOUNTER
Last Clinic Visit:    5/18/18   NV: NONE  Scheduling has been notified to contact the pt for appointment.

## 2019-10-29 ENCOUNTER — TELEPHONE (OUTPATIENT)
Dept: FAMILY MEDICINE | Facility: CLINIC | Age: 84
End: 2019-10-29

## 2019-10-29 ENCOUNTER — ANTICOAGULATION THERAPY VISIT (OUTPATIENT)
Dept: NURSING | Facility: CLINIC | Age: 84
End: 2019-10-29
Payer: COMMERCIAL

## 2019-10-29 DIAGNOSIS — I27.82 OTHER CHRONIC PULMONARY EMBOLISM WITHOUT ACUTE COR PULMONALE (H): Primary | ICD-10-CM

## 2019-10-29 LAB — INR POINT OF CARE: 1.5 (ref 0.86–1.14)

## 2019-10-29 PROCEDURE — 36416 COLLJ CAPILLARY BLOOD SPEC: CPT

## 2019-10-29 PROCEDURE — 99207 ZZC NO CHARGE NURSE ONLY: CPT

## 2019-10-29 PROCEDURE — 85610 PROTHROMBIN TIME: CPT | Mod: QW

## 2019-10-29 NOTE — PROGRESS NOTES
ANTICOAGULATION FOLLOW-UP CLINIC VISIT    Patient Name:  Hermilo Velasquez  Date:  10/29/2019  Contact Type:  Face to Face    SUBJECTIVE:  Patient Findings     Positives:   Missed doses    Comments:   Pt states that he missed a dose a couple weeks ago but denies missing any doses recently.        Clinical Outcomes     Comments:   Pt states that he missed a dose a couple weeks ago but denies missing any doses recently.           OBJECTIVE    INR Protime   Date Value Ref Range Status   10/29/2019 1.5 (A) 0.86 - 1.14 Final       ASSESSMENT / PLAN  INR assessment SUB    Recheck INR In: 2 WEEKS    INR Location Clinic      Anticoagulation Summary  As of 10/29/2019    INR goal:   2.0-3.0   TTR:   46.9 % (3.6 y)   INR used for dosin.5! (10/29/2019)   Warfarin maintenance plan:   5 mg (5 mg x 1) every day   Full warfarin instructions:   10/29: 10 mg; Otherwise 5 mg every day   Weekly warfarin total:   35 mg   Weekly max warfarin dose:   45 mg   Plan last modified:   Mesha Bah RN (2019)   Next INR check:   2019   Target end date:   Indefinite    Indications    Long-term (current) use of anticoagulants [Z79.01] [Z79.01]  Pulmonary embolism  bilateral (H) [I26.99]  Paroxysmal atrial fibrillation (H) [I48.0]             Anticoagulation Episode Summary     INR check location:   Anticoagulation Clinic    Preferred lab:       Send INR reminders to:   TERRIE JACKSON    Comments:         Anticoagulation Care Providers     Provider Role Specialty Phone number    Karson Bishop MD [facility] Internal Medicine [phone]            See the Encounter Report to view Anticoagulation Flowsheet and Dosing Calendar (Go to Encounters tab in chart review, and find the Anticoagulation Therapy Visit)    Pt INR is 1.5 today. Pt denies any changes in health,diet,activity or medication. Sometimes he misses a dose here and there. Pt advised to take 10 mg today 10/29/19 then continue 5 mg daily. Recheck INR in 2 weeks. If  pt is sub-therapeutic then without any missed doses, then may need to increase the maintenance dose. Hermilo aware if signs of clotting (pain, tenderness, swelling, color change in leg or arm, SOB) and bleeding occur (blood in stool, urine, large bruising, bleeding gums, nosebleeds) to have INR check sooner. If sx severe report to ER or concerned for stroke call 911. If general questions or concerns arise, call clinic.         Mesha Bah RN

## 2019-10-29 NOTE — TELEPHONE ENCOUNTER
Patient will call back to schedule appointment.      is strictly seeing Allergy patients, pt can see any MD.

## 2019-11-06 ENCOUNTER — OFFICE VISIT (OUTPATIENT)
Dept: CARDIOLOGY | Facility: CLINIC | Age: 84
End: 2019-11-06
Payer: COMMERCIAL

## 2019-11-06 VITALS
HEIGHT: 70 IN | BODY MASS INDEX: 24.62 KG/M2 | DIASTOLIC BLOOD PRESSURE: 72 MMHG | SYSTOLIC BLOOD PRESSURE: 109 MMHG | HEART RATE: 69 BPM | WEIGHT: 172 LBS

## 2019-11-06 DIAGNOSIS — I10 BENIGN ESSENTIAL HYPERTENSION: ICD-10-CM

## 2019-11-06 DIAGNOSIS — Z95.2 S/P AVR: ICD-10-CM

## 2019-11-06 DIAGNOSIS — E78.2 MIXED HYPERLIPIDEMIA: Primary | ICD-10-CM

## 2019-11-06 DIAGNOSIS — I48.0 PAROXYSMAL ATRIAL FIBRILLATION (H): ICD-10-CM

## 2019-11-06 PROCEDURE — 99213 OFFICE O/P EST LOW 20 MIN: CPT | Performed by: INTERNAL MEDICINE

## 2019-11-06 RX ORDER — METOPROLOL SUCCINATE 100 MG/1
50 TABLET, EXTENDED RELEASE ORAL DAILY
Qty: 45 TABLET | Refills: 3 | Status: ON HOLD | OUTPATIENT
Start: 2019-11-06 | End: 2020-04-02

## 2019-11-06 RX ORDER — ATORVASTATIN CALCIUM 10 MG/1
10 TABLET, FILM COATED ORAL DAILY
Qty: 90 TABLET | Refills: 3 | Status: SHIPPED | OUTPATIENT
Start: 2019-11-06 | End: 2020-11-16

## 2019-11-06 ASSESSMENT — MIFFLIN-ST. JEOR: SCORE: 1461.44

## 2019-11-06 NOTE — LETTER
11/6/2019    Karson Bishop MD  [address]  [place] [address]    RE: Hermilo Velasquez       Dear Colleague,    I had the pleasure of seeing Hermilo Velasquez in the [facility].    HPI and Plan:   See dictation(#[id number])    Orders Placed This Encounter   Procedures     Lipid Profile     ALT     Follow-Up with Cardiologist     Echocardiogram Complete       Orders Placed This Encounter   Medications     atorvastatin (LIPITOR) 10 MG tablet     Sig: Take 1 tablet (10 mg) by mouth daily     Dispense:  90 tablet     Refill:  3     metoprolol succinate ER (TOPROL-XL) 100 MG 24 hr tablet     Sig: Take 0.5 tablets (50 mg) by mouth daily     Dispense:  45 tablet     Refill:  3       Medications Discontinued During This Encounter   Medication Reason     ranitidine (ZANTAC) 150 MG tablet Medication Reconciliation Clean Up     SPIRIVA HANDIHALER 18 MCG inhaled capsule Medication Reconciliation Clean Up     tiotropium (SPIRIVA) 18 MCG inhaled capsule Medication Reconciliation Clean Up     tiotropium (SPIRIVA) 18 MCG inhaled capsule Medication Reconciliation Clean Up     triamcinolone (KENALOG-40) injection 40 mg Error     atorvastatin (LIPITOR) 10 MG tablet Reorder     metoprolol succinate (TOPROL-XL) 100 MG 24 hr tablet Reorder         Encounter Diagnoses   Name Primary?     Mixed hyperlipidemia Yes     Paroxysmal atrial fibrillation (H)      Benign essential hypertension      S/P AVR        CURRENT MEDICATIONS:  Current Outpatient Medications   Medication Sig Dispense Refill     albuterol (PROAIR HFA/PROVENTIL HFA/VENTOLIN HFA) 108 (90 Base) MCG/ACT Inhaler Inhale 1-2 puffs into the lungs every 6 hours as needed for shortness of breath / dyspnea or wheezing 3 Inhaler 5     atorvastatin (LIPITOR) 10 MG tablet Take 1 tablet (10 mg) by mouth daily 90 tablet 3     ferrous sulfate (FEROSUL) 325 (65 Fe) MG tablet Take 1 tablet (325 mg) by mouth every other day 60 tablet 3     gabapentin  "(NEURONTIN) 300 MG capsule Take 300 mg by mouth 2 times daily       hydrOXYzine (ATARAX) 25 MG tablet Take 2 tablets (50 mg) by mouth every 6 hours as needed 360 tablet 3     loratadine (CLARITIN) 10 MG tablet Take 10 mg by mouth daily as needed for allergies        metoprolol succinate ER (TOPROL-XL) 100 MG 24 hr tablet Take 0.5 tablets (50 mg) by mouth daily 45 tablet 3     pantoprazole (PROTONIX) 40 MG EC tablet Take 40 mg by mouth daily       ranitidine (ZANTAC) 300 MG tablet Take 1 tablet (300 mg) by mouth 2 times daily  180 tablet 2     tiotropium (SPIRIVA HANDIHALER) 18 MCG capsule Inhale 1 capsule (18 mcg) into the lungs daily 90 capsule 3     warfarin ANTICOAGULANT (COUMADIN) 5 MG tablet Take 1 tablet (5mg) daily Or as directed by INR nurses 90 tablet 0     doxepin (SINEQUAN) 50 MG capsule Take 50 mg by mouth daily       HYDROcodone-acetaminophen (NORCO) 5-325 MG tablet Take 1 tablet by mouth every 6 hours as needed for pain (Patient not taking: Reported on 11/6/2019) 18 tablet 0     order for DME Equipment being ordered: shoulder sling, use as directed 1 Device 0     order for DME Equipment being ordered: Right neoprene knee brace.  ( Fax to [facility] fax  [phone]) 1 each 0     triamcinolone (KENALOG) 0.1 % cream Apply topically 2 times daily as needed for irritation         ALLERGIES     Allergies   Allergen Reactions     Lidocaine Blisters     Allergy to lidocaine ointment     Omeprazole Itching     Pantoprazole Itching     Prevacid [Lansoprazole] Itching     Lasix [Furosemide] Rash     Penicillins Rash     \"broke out from injection\" 60 yrs ago         PAST MEDICAL HISTORY:  Past Medical History:   Diagnosis Date     Aortic stenosis     Severe AS, 9/2015 AVR - [facility] TRIFECTA Bovine bioprosthesis 25MM TF-25A     Atrial fibrillation (H)     9/2015 Paroxysmal post op Afib - discharged on Warfarin and a beta blocker     Deep vein thrombosis (H)      GERD (gastroesophageal reflux disease)      " Heart murmur      Monoclonal gammopathy     plasmacyte prominent causing monoclonal gammopathy     Need for SBE (subacute bacterial endocarditis) prophylaxis      Neuropathy      Other and unspecified hyperlipidemia      Other malignant lymphomas     non hodgkin's lymphoma     RBBB (right bundle branch block)      Severe sepsis with acute organ dysfunction (H) 11/16/2015     Unspecified hereditary and idiopathic peripheral neuropathy        PAST SURGICAL HISTORY:  Past Surgical History:   Procedure Laterality Date     APPENDECTOMY       AS TOTAL KNEE ARTHROPLASTY       BACK SURGERY       ESOPHAGOSCOPY, GASTROSCOPY, DUODENOSCOPY (EGD), COMBINED N/A 11/28/2015    Procedure: COMBINED ESOPHAGOSCOPY, GASTROSCOPY, DUODENOSCOPY (EGD);  Surgeon: Danis Castillo MD;  Location:  GI     ESOPHAGOSCOPY, GASTROSCOPY, DUODENOSCOPY (EGD), COMBINED N/A 7/26/2018    Procedure: COMBINED ESOPHAGOSCOPY, GASTROSCOPY, DUODENOSCOPY (EGD);;  Surgeon: Toby Dong DO;  Location:  GI     HERNIA REPAIR  2006     HERNIORRHAPHY VENTRAL  4/17/2013    Procedure: HERNIORRHAPHY VENTRAL;  VENTRAL HERNIA REPAIR WITH MESH;  Surgeon: Patel Guzman MD;  Location:  OR     KNEE SURGERY      arthroscopic right knee surgery      LOBECTOMY LUNG Right 3/26/2019    Procedure: POSSIBLE LOBECTOMY LUNG;  Surgeon: Jose A Vasques MD;  Location:  OR     REPAIR LIGAMENT ANKLE  2/23/2012    Procedure:REPAIR LIGAMENT ANKLE; LEFT TARSAL TUNNEL RELEASE OF KNOT OF ARIEL RELEASE; Surgeon:SAUL PUENTE; Location: OR     REPLACE VALVE AORTIC N/A 9/3/2015    Procedure: REPLACE VALVE AORTIC;  Surgeon: Antonino Mitchell MD;  Location:  OR     ROTATOR CUFF REPAIR RT/LT      bilateral     SPINE SURGERY      3 spine surgeries     THORACOTOMY, WEDGE RESECTION LUNG, COMBINED Right 3/26/2019    Procedure: RIGHT THORACOTOMY, WEDGE RESECTION, RIGHT LOWER LOBE LUNG NODULE,;  Surgeon: Jose A Vasques MD;  Location:   OR     TONSILLECTOMY       TURP         FAMILY HISTORY:  Family History   Problem Relation Age of Onset     C.A.D. Father      Emphysema Father      Melanoma No family hx of      Skin Cancer No family hx of        SOCIAL HISTORY:  Social History     Socioeconomic History     Marital status:      Spouse name: None     Number of children: None     Years of education: None     Highest education level: None   Occupational History     None   Social Needs     Financial resource strain: None     Food insecurity:     Worry: None     Inability: None     Transportation needs:     Medical: None     Non-medical: None   Tobacco Use     Smoking status: Former Smoker     Packs/day: 2.00     Years: 55.00     Pack years: 110.00     Last attempt to quit: 1998     Years since quittin.8     Smokeless tobacco: Never Used   Substance and Sexual Activity     Alcohol use: Yes     Alcohol/week: 0.0 standard drinks     Comment: 1 drink per day     Drug use: No     Sexual activity: Not Currently     Partners: Female   Lifestyle     Physical activity:     Days per week: None     Minutes per session: None     Stress: None   Relationships     Social connections:     Talks on phone: None     Gets together: None     Attends Worship service: None     Active member of club or organization: None     Attends meetings of clubs or organizations: None     Relationship status: None     Intimate partner violence:     Fear of current or ex partner: None     Emotionally abused: None     Physically abused: None     Forced sexual activity: None   Other Topics Concern     Parent/sibling w/ CABG, MI or angioplasty before 65F 55M? Not Asked      Service Not Asked     Blood Transfusions Not Asked     Caffeine Concern No     Comment: occ     Occupational Exposure Not Asked     Hobby Hazards Not Asked     Sleep Concern Not Asked     Stress Concern Not Asked     Weight Concern Not Asked     Special Diet No     Back Care Not Asked      "Exercise No     Bike Helmet Not Asked     Seat Belt Yes     Self-Exams Not Asked   Social History Narrative    , [place]    Retired  - self employed       Review of Systems:  Skin:  Negative       Eyes:  Positive for glasses    ENT:  Negative      Respiratory:  Negative       Cardiovascular:    Positive for;dizziness;lightheadedness;edema    Gastroenterology: Negative      Genitourinary:  Negative      Musculoskeletal:  Positive for arthritis    Neurologic:  Negative      Psychiatric:  Negative      Heme/Lymph/Imm:  Positive for allergies    Endocrine:  Negative        Physical Exam:  Vitals: /72   Pulse 69   Ht 1.778 m (5' 10\")   Wt 78 kg (172 lb)   BMI 24.68 kg/m       Constitutional:           Skin:             Head:           Eyes:           Lymph:      ENT:           Neck:           Respiratory:            Cardiac:                                                           GI:           Extremities and Muscular Skeletal:                 Neurological:           Psych:             CC  Karson Bishop MD  [address]  [place] [address]                    Thank you for allowing me to participate in the care of your patient.      Sincerely,     Tab Grijalva MD     [facility]    cc:   Karson Bishop MD  [address]  [place] [address]        "

## 2019-11-06 NOTE — PROGRESS NOTES
HPI and Plan:   See dictation(#[id number])    Orders Placed This Encounter   Procedures     Lipid Profile     ALT     Follow-Up with Cardiologist     Echocardiogram Complete       Orders Placed This Encounter   Medications     atorvastatin (LIPITOR) 10 MG tablet     Sig: Take 1 tablet (10 mg) by mouth daily     Dispense:  90 tablet     Refill:  3     metoprolol succinate ER (TOPROL-XL) 100 MG 24 hr tablet     Sig: Take 0.5 tablets (50 mg) by mouth daily     Dispense:  45 tablet     Refill:  3       Medications Discontinued During This Encounter   Medication Reason     ranitidine (ZANTAC) 150 MG tablet Medication Reconciliation Clean Up     SPIRIVA HANDIHALER 18 MCG inhaled capsule Medication Reconciliation Clean Up     tiotropium (SPIRIVA) 18 MCG inhaled capsule Medication Reconciliation Clean Up     tiotropium (SPIRIVA) 18 MCG inhaled capsule Medication Reconciliation Clean Up     triamcinolone (KENALOG-40) injection 40 mg Error     atorvastatin (LIPITOR) 10 MG tablet Reorder     metoprolol succinate (TOPROL-XL) 100 MG 24 hr tablet Reorder         Encounter Diagnoses   Name Primary?     Mixed hyperlipidemia Yes     Paroxysmal atrial fibrillation (H)      Benign essential hypertension      S/P AVR        CURRENT MEDICATIONS:  Current Outpatient Medications   Medication Sig Dispense Refill     albuterol (PROAIR HFA/PROVENTIL HFA/VENTOLIN HFA) 108 (90 Base) MCG/ACT Inhaler Inhale 1-2 puffs into the lungs every 6 hours as needed for shortness of breath / dyspnea or wheezing 3 Inhaler 5     atorvastatin (LIPITOR) 10 MG tablet Take 1 tablet (10 mg) by mouth daily 90 tablet 3     ferrous sulfate (FEROSUL) 325 (65 Fe) MG tablet Take 1 tablet (325 mg) by mouth every other day 60 tablet 3     gabapentin (NEURONTIN) 300 MG capsule Take 300 mg by mouth 2 times daily       hydrOXYzine (ATARAX) 25 MG tablet Take 2 tablets (50 mg) by mouth every 6 hours as needed 360 tablet 3     loratadine (CLARITIN) 10 MG tablet Take 10 mg by mouth  "daily as needed for allergies        metoprolol succinate ER (TOPROL-XL) 100 MG 24 hr tablet Take 0.5 tablets (50 mg) by mouth daily 45 tablet 3     pantoprazole (PROTONIX) 40 MG EC tablet Take 40 mg by mouth daily       ranitidine (ZANTAC) 300 MG tablet Take 1 tablet (300 mg) by mouth 2 times daily  180 tablet 2     tiotropium (SPIRIVA HANDIHALER) 18 MCG capsule Inhale 1 capsule (18 mcg) into the lungs daily 90 capsule 3     warfarin ANTICOAGULANT (COUMADIN) 5 MG tablet Take 1 tablet (5mg) daily Or as directed by INR nurses 90 tablet 0     doxepin (SINEQUAN) 50 MG capsule Take 50 mg by mouth daily       HYDROcodone-acetaminophen (NORCO) 5-325 MG tablet Take 1 tablet by mouth every 6 hours as needed for pain (Patient not taking: Reported on 11/6/2019) 18 tablet 0     order for DME Equipment being ordered: shoulder sling, use as directed 1 Device 0     order for DME Equipment being ordered: Right neoprene knee brace.  ( Fax to [facility] [employer] fax  [phone]) 1 each 0     triamcinolone (KENALOG) 0.1 % cream Apply topically 2 times daily as needed for irritation         ALLERGIES     Allergies   Allergen Reactions     Lidocaine Blisters     Allergy to lidocaine ointment     Omeprazole Itching     Pantoprazole Itching     Prevacid [Lansoprazole] Itching     Lasix [Furosemide] Rash     Penicillins Rash     \"broke out from injection\" 60 yrs ago         PAST MEDICAL HISTORY:  Past Medical History:   Diagnosis Date     Aortic stenosis     Severe AS, 9/2015 AVR - [facility] TRIFECTA Bovine bioprosthesis 25MM TF-25A     Atrial fibrillation (H)     9/2015 Paroxysmal post op Afib - discharged on Warfarin and a beta blocker     Deep vein thrombosis (H)      GERD (gastroesophageal reflux disease)      Heart murmur      Monoclonal gammopathy     plasmacyte prominent causing monoclonal gammopathy     Need for SBE (subacute bacterial endocarditis) prophylaxis      Neuropathy      Other and unspecified hyperlipidemia      Other " malignant lymphomas     non hodgkin's lymphoma     RBBB (right bundle branch block)      Severe sepsis with acute organ dysfunction (H) 11/16/2015     Unspecified hereditary and idiopathic peripheral neuropathy        PAST SURGICAL HISTORY:  Past Surgical History:   Procedure Laterality Date     APPENDECTOMY       AS TOTAL KNEE ARTHROPLASTY       BACK SURGERY       ESOPHAGOSCOPY, GASTROSCOPY, DUODENOSCOPY (EGD), COMBINED N/A 11/28/2015    Procedure: COMBINED ESOPHAGOSCOPY, GASTROSCOPY, DUODENOSCOPY (EGD);  Surgeon: Danis Castillo MD;  Location:  GI     ESOPHAGOSCOPY, GASTROSCOPY, DUODENOSCOPY (EGD), COMBINED N/A 7/26/2018    Procedure: COMBINED ESOPHAGOSCOPY, GASTROSCOPY, DUODENOSCOPY (EGD);;  Surgeon: Toby Dong DO;  Location:  GI     HERNIA REPAIR  2006     HERNIORRHAPHY VENTRAL  4/17/2013    Procedure: HERNIORRHAPHY VENTRAL;  VENTRAL HERNIA REPAIR WITH MESH;  Surgeon: Patel Guzman MD;  Location:  OR     KNEE SURGERY      arthroscopic right knee surgery      LOBECTOMY LUNG Right 3/26/2019    Procedure: POSSIBLE LOBECTOMY LUNG;  Surgeon: Jose A Vasques MD;  Location:  OR     REPAIR LIGAMENT ANKLE  2/23/2012    Procedure:REPAIR LIGAMENT ANKLE; LEFT TARSAL TUNNEL RELEASE OF KNOT OF ARIEL RELEASE; Surgeon:SAUL PUENTE; Location: OR     REPLACE VALVE AORTIC N/A 9/3/2015    Procedure: REPLACE VALVE AORTIC;  Surgeon: Antonino Mitchell MD;  Location:  OR     ROTATOR CUFF REPAIR RT/LT      bilateral     SPINE SURGERY      3 spine surgeries     THORACOTOMY, WEDGE RESECTION LUNG, COMBINED Right 3/26/2019    Procedure: RIGHT THORACOTOMY, WEDGE RESECTION, RIGHT LOWER LOBE LUNG NODULE,;  Surgeon: Jose A Vasques MD;  Location:  OR     TONSILLECTOMY       TURP         FAMILY HISTORY:  Family History   Problem Relation Age of Onset     C.A.D. Father      Emphysema Father      Melanoma No family hx of      Skin Cancer No family hx of        SOCIAL  HISTORY:  Social History     Socioeconomic History     Marital status:      Spouse name: None     Number of children: None     Years of education: None     Highest education level: None   Occupational History     None   Social Needs     Financial resource strain: None     Food insecurity:     Worry: None     Inability: None     Transportation needs:     Medical: None     Non-medical: None   Tobacco Use     Smoking status: Former Smoker     Packs/day: 2.00     Years: 55.00     Pack years: 110.00     Last attempt to quit: 1998     Years since quittin.8     Smokeless tobacco: Never Used   Substance and Sexual Activity     Alcohol use: Yes     Alcohol/week: 0.0 standard drinks     Comment: 1 drink per day     Drug use: No     Sexual activity: Not Currently     Partners: Female   Lifestyle     Physical activity:     Days per week: None     Minutes per session: None     Stress: None   Relationships     Social connections:     Talks on phone: None     Gets together: None     Attends Denominational service: None     Active member of club or organization: None     Attends meetings of clubs or organizations: None     Relationship status: None     Intimate partner violence:     Fear of current or ex partner: None     Emotionally abused: None     Physically abused: None     Forced sexual activity: None   Other Topics Concern     Parent/sibling w/ CABG, MI or angioplasty before 65F 55M? Not Asked      Service Not Asked     Blood Transfusions Not Asked     Caffeine Concern No     Comment: occ     Occupational Exposure Not Asked     Hobby Hazards Not Asked     Sleep Concern Not Asked     Stress Concern Not Asked     Weight Concern Not Asked     Special Diet No     Back Care Not Asked     Exercise No     Bike Helmet Not Asked     Seat Belt Yes     Self-Exams Not Asked   Social History Narrative    , [place]    Retired  - self employed       Review of Systems:  Skin:   "Negative       Eyes:  Positive for glasses    ENT:  Negative      Respiratory:  Negative       Cardiovascular:    Positive for;dizziness;lightheadedness;edema    Gastroenterology: Negative      Genitourinary:  Negative      Musculoskeletal:  Positive for arthritis    Neurologic:  Negative      Psychiatric:  Negative      Heme/Lymph/Imm:  Positive for allergies    Endocrine:  Negative        Physical Exam:  Vitals: /72   Pulse 69   Ht 1.778 m (5' 10\")   Wt 78 kg (172 lb)   BMI 24.68 kg/m      Constitutional:           Skin:             Head:           Eyes:           Lymph:      ENT:           Neck:           Respiratory:            Cardiac:                                                           GI:           Extremities and Muscular Skeletal:                 Neurological:           Psych:             CC  Karson Bishop MD  [address]  [place] [address]                  "

## 2019-11-06 NOTE — PROGRESS NOTES
Service Date: 11/06/2019      OFFICE PROGRESS NOTE       REASON FOR CLINIC VISIT:  Followup AVR.      HISTORY OF PRESENT ILLNESS:  Mr. Velasquez is a very pleasant 87-year-old gentleman with history of aortic valve stenosis status post bioprosthetic aortic valve replacement in 2015 with pre-AVR coronary angiogram showing minimal coronary artery disease with history of paroxysmal atrial fibrillation, on Coumadin, with history of GI bleed in the past but has been tolerating Coumadin quite well without any recurrence of GI bleed with also history of bilateral PE and DVT.  Today, he is coming for routine followup.  The patient has no specific cardiac complaints.  He denies any chest discomfort or shortness of breath, dizziness, presyncope or syncope.  He had an echocardiogram done last year that showed LVEF of 50%-55%, normal functioning bioprosthetic aortic valve.  He does have history of decreased RV function and increased size since the time of PE.  Last echocardiogram also showed mildly decreased RV systolic function.  Clinically, he has never been in congestive heart failure or either right or left-sided.  The patient is tolerating Coumadin quite well without any issues.  He is on low-dose statin and beta blocker.  Historically, LDL has been well controlled.      PHYSICAL EXAMINATION:   VITAL SIGNS:  Blood pressure 109/72, heart rate 69 and regular, weight 172 pounds, BMI 24.68.   GENERAL:  The patient appears pleasant, comfortable.   NECK:  Normal JVP, no bruit.   CARDIOVASCULAR SYSTEM:  S1, S2 normal, no murmur, rub or gallop.   RESPIRATORY SYSTEM:  Clear to auscultation bilaterally.   GASTROINTESTINAL SYSTEM:  Abdomen soft, nontender.   EXTREMITIES:  No pitting pedal edema.   NEUROLOGICAL:  Alert, oriented x3.   PSYCHIATRIC:  Normal affect.   SKIN:  No obvious rash.    HEENT:  No pallor or icterus.      IMPRESSION AND PLAN:  A very pleasant 87-year-old gentleman with history of bioprosthetic aortic valve,  history of paroxysmal atrial fibrillation, CHADS2-VASc score of 3, on Coumadin for stroke prophylaxis, with echocardiogram showing LVEF around 50%-55% last year with normal functioning bioprosthetic valve.  Cardiac auscultation was benign today.  Overall, I think he is doing quite well without any symptoms of angina or congestive heart failure.  We discussed importance of predental workup antibiotic prophylaxis.  If he continues to feel stable cardiac status-wise, we can see him back in 1 year, sooner if he notes any change in clinical status, especially if any exertional-related symptoms.         SANIYA ZEPEDA MD             D: 2019   T: 2019   MT: DULCE      Name:     CLYDE RODRIGUEZ   MRN:      [id number]        Account:      [id number]   :      1932           Service Date: 2019      Document: [id number]

## 2019-11-06 NOTE — LETTER
11/6/2019      Karson Bishpo MD  [address]  [facility] [address]      RE: Hermilo Tatekevin       Dear Colleague,    I had the pleasure of seeing Hermilo Velasquez in the [facility].    Service Date: 11/06/2019      OFFICE PROGRESS NOTE       REASON FOR CLINIC VISIT:  Followup AVR.      HISTORY OF PRESENT ILLNESS:  Mr. Velasquez is a very pleasant 87-year-old gentleman with history of aortic valve stenosis status post bioprosthetic aortic valve replacement in 2015 with pre-AVR coronary angiogram showing minimal coronary artery disease with history of paroxysmal atrial fibrillation, on Coumadin, with history of GI bleed in the past but has been tolerating Coumadin quite well without any recurrence of GI bleed with also history of bilateral PE and DVT.  Today, he is coming for routine followup.  The patient has no specific cardiac complaints.  He denies any chest discomfort or shortness of breath, dizziness, presyncope or syncope.  He had an echocardiogram done last year that showed LVEF of 50%-55%, normal functioning bioprosthetic aortic valve.  He does have history of decreased RV function and increased size since the time of PE.  Last echocardiogram also showed mildly decreased RV systolic function.  Clinically, he has never been in congestive heart failure or either right or left-sided.  The patient is tolerating Coumadin quite well without any issues.  He is on low-dose statin and beta blocker.  Historically, LDL has been well controlled.      PHYSICAL EXAMINATION:   VITAL SIGNS:  Blood pressure 109/72, heart rate 69 and regular, weight 172 pounds, BMI 24.68.   GENERAL:  The patient appears pleasant, comfortable.   NECK:  Normal JVP, no bruit.   CARDIOVASCULAR SYSTEM:  S1, S2 normal, no murmur, rub or gallop.   RESPIRATORY SYSTEM:  Clear to auscultation bilaterally.   GASTROINTESTINAL SYSTEM:  Abdomen soft, nontender.   EXTREMITIES:  No pitting pedal edema.    NEUROLOGICAL:  Alert, oriented x3.   PSYCHIATRIC:  Normal affect.   SKIN:  No obvious rash.    HEENT:  No pallor or icterus.      IMPRESSION AND PLAN:  A very pleasant 87-year-old gentleman with history of bioprosthetic aortic valve, history of paroxysmal atrial fibrillation, CHADS2-VASc score of 3, on Coumadin for stroke prophylaxis, with echocardiogram showing LVEF around 50%-55% last year with normal functioning bioprosthetic valve.  Cardiac auscultation was benign today.  Overall, I think he is doing quite well without any symptoms of angina or congestive heart failure.  We discussed importance of predental workup antibiotic prophylaxis.  If he continues to feel stable cardiac status-wise, we can see him back in 1 year, sooner if he notes any change in clinical status, especially if any exertional-related symptoms.         SANIYA ZEPEDA MD             D: 2019   T: 2019   MT: DULCE      Name:     CLYDE RODRIGUEZ   MRN:      [id number]        Account:      [id number]   :      1932           Service Date: 2019      Document: [id number]         Outpatient Encounter Medications as of 2019   Medication Sig Dispense Refill     albuterol (PROAIR HFA/PROVENTIL HFA/VENTOLIN HFA) 108 (90 Base) MCG/ACT Inhaler Inhale 1-2 puffs into the lungs every 6 hours as needed for shortness of breath / dyspnea or wheezing 3 Inhaler 5     atorvastatin (LIPITOR) 10 MG tablet Take 1 tablet (10 mg) by mouth daily 90 tablet 3     ferrous sulfate (FEROSUL) 325 (65 Fe) MG tablet Take 1 tablet (325 mg) by mouth every other day 60 tablet 3     gabapentin (NEURONTIN) 300 MG capsule Take 300 mg by mouth 2 times daily       hydrOXYzine (ATARAX) 25 MG tablet Take 2 tablets (50 mg) by mouth every 6 hours as needed 360 tablet 3     loratadine (CLARITIN) 10 MG tablet Take 10 mg by mouth daily as needed for allergies        metoprolol succinate ER (TOPROL-XL) 100 MG 24 hr tablet Take 0.5 tablets (50 mg) by mouth  daily 45 tablet 3     pantoprazole (PROTONIX) 40 MG EC tablet Take 40 mg by mouth daily       ranitidine (ZANTAC) 300 MG tablet Take 1 tablet (300 mg) by mouth 2 times daily  180 tablet 2     tiotropium (SPIRIVA HANDIHALER) 18 MCG capsule Inhale 1 capsule (18 mcg) into the lungs daily 90 capsule 3     warfarin ANTICOAGULANT (COUMADIN) 5 MG tablet Take 1 tablet (5mg) daily Or as directed by INR nurses 90 tablet 0     doxepin (SINEQUAN) 50 MG capsule Take 50 mg by mouth daily       HYDROcodone-acetaminophen (NORCO) 5-325 MG tablet Take 1 tablet by mouth every 6 hours as needed for pain (Patient not taking: Reported on 11/6/2019) 18 tablet 0     order for DME Equipment being ordered: shoulder sling, use as directed 1 Device 0     order for DME Equipment being ordered: Right neoprene knee brace.  ( Fax to [facility] fax  [phone]) 1 each 0     triamcinolone (KENALOG) 0.1 % cream Apply topically 2 times daily as needed for irritation       [DISCONTINUED] atorvastatin (LIPITOR) 10 MG tablet Take 1 tablet (10 mg) by mouth daily 90 tablet 0     [DISCONTINUED] metoprolol succinate (TOPROL-XL) 100 MG 24 hr tablet Take 0.5 tablets (50 mg) by mouth daily 90 tablet 3     [DISCONTINUED] ranitidine (ZANTAC) 150 MG tablet Take 1 tablet (150 mg) by mouth daily 90 tablet 3     [DISCONTINUED] SPIRIVA HANDIHALER 18 MCG inhaled capsule Inhale 1 capsule (18 mcg) into the lungs daily  90 capsule 2     [DISCONTINUED] tiotropium (SPIRIVA) 18 MCG inhaled capsule Inhale 1 capsule (18 mcg) into the lungs daily 1 capsule 3     [DISCONTINUED] tiotropium (SPIRIVA) 18 MCG inhaled capsule Inhale 1 capsule (18 mcg) into the lungs daily 90 capsule 3     [DISCONTINUED] triamcinolone (KENALOG-40) injection 40 mg        No facility-administered encounter medications on file as of 11/6/2019.            Again, thank you for allowing me to participate in the care of your patient.      Sincerely,    Tab Grijalva MD     [facility]  [facility]

## 2019-11-12 ENCOUNTER — ANTICOAGULATION THERAPY VISIT (OUTPATIENT)
Dept: NURSING | Facility: CLINIC | Age: 84
End: 2019-11-12
Payer: COMMERCIAL

## 2019-11-12 LAB — INR POINT OF CARE: 2.9 (ref 0.86–1.14)

## 2019-11-12 PROCEDURE — 36416 COLLJ CAPILLARY BLOOD SPEC: CPT

## 2019-11-12 PROCEDURE — 85610 PROTHROMBIN TIME: CPT | Mod: QW

## 2019-11-12 PROCEDURE — 99207 ZZC NO CHARGE NURSE ONLY: CPT

## 2019-11-12 NOTE — PROGRESS NOTES
ANTICOAGULATION FOLLOW-UP CLINIC VISIT    Patient Name:  Hermilo Velasquez  Date:  2019  Contact Type:  Face to Face    SUBJECTIVE:  Patient Findings     Comments:   The patient was assessed for diet, medication, and activity level changes, missed or extra doses, bruising or bleeding, with no problem findings.        Clinical Outcomes     Negatives:   Major bleeding event, Thromboembolic event, Anticoagulation-related hospital admission, Anticoagulation-related ED visit, Anticoagulation-related fatality    Comments:   The patient was assessed for diet, medication, and activity level changes, missed or extra doses, bruising or bleeding, with no problem findings.           OBJECTIVE    INR Protime   Date Value Ref Range Status   2019 2.9 (A) 0.86 - 1.14 Final       ASSESSMENT / PLAN  INR assessment THER    Recheck INR In: 4 WEEKS    INR Location Clinic      Anticoagulation Summary  As of 2019    INR goal:   2.0-3.0   TTR:   47.1 % (3.6 y)   INR used for dosin.9 (2019)   Warfarin maintenance plan:   5 mg (5 mg x 1) every day   Full warfarin instructions:   5 mg every day   Weekly warfarin total:   35 mg   Weekly max warfarin dose:   45 mg   No change documented:   Mesha Bah RN   Plan last modified:   Mesha Bah RN (2019)   Next INR check:   12/10/2019   Target end date:   Indefinite    Indications    Long-term (current) use of anticoagulants [Z79.01] [Z79.01]  Pulmonary embolism  bilateral (H) [I26.99]  Paroxysmal atrial fibrillation (H) [I48.0]             Anticoagulation Episode Summary     INR check location:   Anticoagulation Clinic    Preferred lab:       Send INR reminders to:   TERRIE JACKSON    Comments:         Anticoagulation Care Providers     Provider Role Specialty Phone number    Karson Bishop MD [facility] Internal Medicine [phone]            See the Encounter Report to view Anticoagulation Flowsheet and Dosing Calendar (Go to Encounters tab in  chart review, and find the Anticoagulation Therapy Visit)    Pt INR is 2.9 today. Pt did not miss any doses. Pt advised to continue taking 5 mg daily. Recheck INR in 4 weeks or sooner as needed. Hermilo aware if signs of clotting (pain, tenderness, swelling, color change in leg or arm, SOB) and bleeding occur (blood in stool, urine, large bruising, bleeding gums, nosebleeds) to have INR check sooner. If sx severe report to ER or concerned for stroke call 911. If general questions or concerns arise, call clinic.         Mesha Bah RN

## 2019-11-29 ENCOUNTER — OFFICE VISIT (OUTPATIENT)
Dept: FAMILY MEDICINE | Facility: CLINIC | Age: 84
End: 2019-11-29
Payer: COMMERCIAL

## 2019-11-29 VITALS
DIASTOLIC BLOOD PRESSURE: 60 MMHG | HEIGHT: 70 IN | WEIGHT: 175.5 LBS | HEART RATE: 55 BPM | SYSTOLIC BLOOD PRESSURE: 126 MMHG | TEMPERATURE: 96.8 F | BODY MASS INDEX: 25.13 KG/M2 | OXYGEN SATURATION: 93 %

## 2019-11-29 DIAGNOSIS — I10 ESSENTIAL HYPERTENSION WITH GOAL BLOOD PRESSURE LESS THAN 140/90: ICD-10-CM

## 2019-11-29 DIAGNOSIS — K21.9 GASTROESOPHAGEAL REFLUX DISEASE WITHOUT ESOPHAGITIS: ICD-10-CM

## 2019-11-29 DIAGNOSIS — M25.512 ACUTE PAIN OF LEFT SHOULDER: Primary | ICD-10-CM

## 2019-11-29 PROCEDURE — 99213 OFFICE O/P EST LOW 20 MIN: CPT | Performed by: INTERNAL MEDICINE

## 2019-11-29 RX ORDER — FAMOTIDINE 40 MG/1
40 TABLET, FILM COATED ORAL 2 TIMES DAILY PRN
Qty: 180 TABLET | Refills: 3 | Status: SHIPPED | OUTPATIENT
Start: 2019-11-29 | End: 2020-03-12

## 2019-11-29 ASSESSMENT — MIFFLIN-ST. JEOR: SCORE: 1477.31

## 2019-11-29 NOTE — PROGRESS NOTES
Subjective     Hermilo Velasquez is a 87 year old male who presents to clinic today for the following health issues:    HPI     Fall on out streched left arm in September   Moderate to severe associated pain with rasing left arm since then   X-rays in September did not show fracture  Remote history of bilateral rotator cuff repairs with Dr. Wills  Needs an alternative to ranitidine to manage gastroesophageal reflux disease symptoms.  Has frequent heartburn, denies weight loss, odynophagia, dysphagia, blood in stools or melena.  Symptoms have been unchanged for many years.  EGD in 2017 was normal.        Patient Active Problem List   Diagnosis     Ventral hernia     Nonrheumatic aortic valve stenosis     GERD (gastroesophageal reflux disease)     Non Hodgkin's lymphoma (H): 2013     Microscopic hematuria     Health Care Home     CARDIOVASCULAR SCREENING; LDL GOAL LESS THAN 130     History of colonic polyps     Neuropathy     Anemia due to blood loss, acute     Physical deconditioning     Paroxysmal atrial fibrillation (H)     Need for SBE (subacute bacterial endocarditis) prophylaxis     RBBB (right bundle branch block)     Gastrointestinal hemorrhage with melena     Primary osteoarthritis of both knees     Primary osteoarthritis of left hip     Pulmonary embolism, bilateral (H)     S/P IVC filter     Long-term (current) use of anticoagulants [Z79.01]     Benign neoplasm of colon, unspecified part of colon     Pulmonary nodules     Benign neoplasm of colon     Primary osteoarthritis of left knee     Essential hypertension with goal blood pressure less than 140/90     Non-Hodgkin's lymphoma, unspecified body region, unspecified non-Hodgkin lymphoma type (H)     Anticoagulated on Coumadin     Other chronic pulmonary embolism without acute cor pulmonale (H)     Epistaxis     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     Aftercare following left knee joint replacement surgery     Rash     Drainage from wound:  left Knee     Lower extremity edema     Leg edema, left     S/P total knee arthroplasty     ACP (advance care planning)     Hyperlipidemia LDL goal <130     Chronic obstructive pulmonary disease, unspecified COPD type (H)     Iron deficiency anemia due to chronic blood loss     Spongiotic dermatitis     Nodule of lower lobe of right lung     Cellulitis     Past Surgical History:   Procedure Laterality Date     APPENDECTOMY       AS TOTAL KNEE ARTHROPLASTY       BACK SURGERY       ESOPHAGOSCOPY, GASTROSCOPY, DUODENOSCOPY (EGD), COMBINED N/A 11/28/2015    Procedure: COMBINED ESOPHAGOSCOPY, GASTROSCOPY, DUODENOSCOPY (EGD);  Surgeon: Danis Castillo MD;  Location:  GI     ESOPHAGOSCOPY, GASTROSCOPY, DUODENOSCOPY (EGD), COMBINED N/A 7/26/2018    Procedure: COMBINED ESOPHAGOSCOPY, GASTROSCOPY, DUODENOSCOPY (EGD);;  Surgeon: Toby Dong DO;  Location:  GI     HERNIA REPAIR  2006     HERNIORRHAPHY VENTRAL  4/17/2013    Procedure: HERNIORRHAPHY VENTRAL;  VENTRAL HERNIA REPAIR WITH MESH;  Surgeon: Patel Guzman MD;  Location:  OR     KNEE SURGERY      arthroscopic right knee surgery      LOBECTOMY LUNG Right 3/26/2019    Procedure: POSSIBLE LOBECTOMY LUNG;  Surgeon: Jose A Vasques MD;  Location:  OR     REPAIR LIGAMENT ANKLE  2/23/2012    Procedure:REPAIR LIGAMENT ANKLE; LEFT TARSAL TUNNEL RELEASE OF KNOT OF ARIEL RELEASE; Surgeon:SAUL PUENTE; Location: OR     REPLACE VALVE AORTIC N/A 9/3/2015    Procedure: REPLACE VALVE AORTIC;  Surgeon: Antonino Mitchell MD;  Location:  OR     ROTATOR CUFF REPAIR RT/LT      bilateral     SPINE SURGERY      3 spine surgeries     THORACOTOMY, WEDGE RESECTION LUNG, COMBINED Right 3/26/2019    Procedure: RIGHT THORACOTOMY, WEDGE RESECTION, RIGHT LOWER LOBE LUNG NODULE,;  Surgeon: Jose A Vasques MD;  Location:  OR     TONSILLECTOMY       TURP         Social History     Tobacco Use     Smoking status: Former Smoker      Packs/day: 2.00     Years: 55.00     Pack years: 110.00     Last attempt to quit: 1998     Years since quittin.8     Smokeless tobacco: Never Used   Substance Use Topics     Alcohol use: Yes     Alcohol/week: 0.0 standard drinks     Comment: 1 drink per day     Family History   Problem Relation Age of Onset     C.A.D. Father      Emphysema Father      Melanoma No family hx of      Skin Cancer No family hx of          Current Outpatient Medications   Medication Sig Dispense Refill     albuterol (PROAIR HFA/PROVENTIL HFA/VENTOLIN HFA) 108 (90 Base) MCG/ACT Inhaler Inhale 1-2 puffs into the lungs every 6 hours as needed for shortness of breath / dyspnea or wheezing 3 Inhaler 5     atorvastatin (LIPITOR) 10 MG tablet Take 1 tablet (10 mg) by mouth daily 90 tablet 3     doxepin (SINEQUAN) 50 MG capsule Take 50 mg by mouth daily       famotidine (PEPCID) 40 MG tablet Take 1 tablet (40 mg) by mouth 2 times daily as needed (heartburn) 180 tablet 3     ferrous sulfate (FEROSUL) 325 (65 Fe) MG tablet Take 1 tablet (325 mg) by mouth every other day 60 tablet 3     gabapentin (NEURONTIN) 300 MG capsule Take 300 mg by mouth 2 times daily       HYDROcodone-acetaminophen (NORCO) 5-325 MG tablet Take 1 tablet by mouth every 6 hours as needed for pain 18 tablet 0     hydrOXYzine (ATARAX) 25 MG tablet Take 2 tablets (50 mg) by mouth every 6 hours as needed 360 tablet 3     loratadine (CLARITIN) 10 MG tablet Take 10 mg by mouth daily as needed for allergies        metoprolol succinate ER (TOPROL-XL) 100 MG 24 hr tablet Take 0.5 tablets (50 mg) by mouth daily 45 tablet 3     order for DME Equipment being ordered: Right neoprene knee brace.  ( Fax to [facility] fax  [phone]) 1 each 0     tiotropium (SPIRIVA HANDIHALER) 18 MCG capsule Inhale 1 capsule (18 mcg) into the lungs daily 90 capsule 3     triamcinolone (KENALOG) 0.1 % cream Apply topically 2 times daily as needed for irritation       warfarin ANTICOAGULANT  "(COUMADIN) 5 MG tablet Take 1 tablet (5mg) daily Or as directed by INR nurses 90 tablet 0     Allergies   Allergen Reactions     Lidocaine Blisters     Allergy to lidocaine ointment     Omeprazole Itching     Pantoprazole Itching     Prevacid [Lansoprazole] Itching     Lasix [Furosemide] Rash     Penicillins Rash     \"broke out from injection\" 60 yrs ago           Reviewed and updated as needed this visit by Provider         Review of Systems   ROS COMP: Constitutional, HEENT, cardiovascular, pulmonary, gi and gu systems are negative, except as otherwise noted.      Objective    /60 (BP Location: Right arm, Cuff Size: Adult Regular)   Pulse 55   Temp 96.8  F (36  C) (Oral)   Ht 1.778 m (5' 10\")   Wt 79.6 kg (175 lb 8 oz)   SpO2 93%   BMI 25.18 kg/m    Body mass index is 25.18 kg/m .  Physical Exam   General: This is a well-appearing, comfortable appearing man in no acute distress.  Shoulder: There is an impingement sign on the left side, there is good strength of all the muscles of the rotator cuff on both shoulders, there are scars from previous shoulder surgeries on both shoulders            Assessment & Plan     1. Acute pain of left shoulder  Suspect rotator cuff injury without rotator cuff rupture contributing to left shoulder pain, might benefit from physical therapy preceded by a cortisone injection, I recommended an ultrasound-guided injection with Dr. Azul.  Hopefully, he can be seen next week.  - SPORTS MEDICINE REFERRAL    2. Gastroesophageal reflux disease without esophagitis  Can try Pepcid in lieu of ranitidine for heartburn management  - famotidine (PEPCID) 40 MG tablet; Take 1 tablet (40 mg) by mouth 2 times daily as needed (heartburn)  Dispense: 180 tablet; Refill: 3    3. Essential hypertension with goal blood pressure less than 140/90  Blood pressure under good control, continue current medications                Return in about 1 week (around 12/6/2019) for consult with Dr." Jorge Alberto .    Karson Bishop MD  [facility]

## 2019-12-04 ENCOUNTER — TELEPHONE (OUTPATIENT)
Dept: FAMILY MEDICINE | Facility: CLINIC | Age: 84
End: 2019-12-04

## 2019-12-04 ENCOUNTER — TELEPHONE (OUTPATIENT)
Dept: ORTHOPEDICS | Facility: CLINIC | Age: 84
End: 2019-12-04

## 2019-12-04 ENCOUNTER — OFFICE VISIT (OUTPATIENT)
Dept: ORTHOPEDICS | Facility: CLINIC | Age: 84
End: 2019-12-04
Attending: INTERNAL MEDICINE
Payer: COMMERCIAL

## 2019-12-04 VITALS
HEIGHT: 70 IN | DIASTOLIC BLOOD PRESSURE: 54 MMHG | BODY MASS INDEX: 25.05 KG/M2 | WEIGHT: 175 LBS | SYSTOLIC BLOOD PRESSURE: 116 MMHG

## 2019-12-04 DIAGNOSIS — M25.512 ACUTE SHOULDER PAIN DUE TO TRAUMA, LEFT: Primary | ICD-10-CM

## 2019-12-04 DIAGNOSIS — M67.911 ROTATOR CUFF DYSFUNCTION, RIGHT: ICD-10-CM

## 2019-12-04 DIAGNOSIS — Z98.890 S/P ROTATOR CUFF SURGERY: ICD-10-CM

## 2019-12-04 DIAGNOSIS — G89.11 ACUTE SHOULDER PAIN DUE TO TRAUMA, LEFT: Primary | ICD-10-CM

## 2019-12-04 PROCEDURE — 99204 OFFICE O/P NEW MOD 45 MIN: CPT | Mod: 25 | Performed by: FAMILY MEDICINE

## 2019-12-04 PROCEDURE — 20610 DRAIN/INJ JOINT/BURSA W/O US: CPT | Mod: RT | Performed by: FAMILY MEDICINE

## 2019-12-04 RX ADMIN — LIDOCAINE HYDROCHLORIDE 4 ML: 10 INJECTION, SOLUTION INFILTRATION; PERINEURAL at 12:51

## 2019-12-04 RX ADMIN — TRIAMCINOLONE ACETONIDE 40 MG: 40 INJECTION, SUSPENSION INTRA-ARTICULAR; INTRAMUSCULAR at 12:51

## 2019-12-04 RX ADMIN — LIDOCAINE HYDROCHLORIDE 5 ML: 10 INJECTION, SOLUTION INFILTRATION; PERINEURAL at 12:51

## 2019-12-04 ASSESSMENT — MIFFLIN-ST. JEOR: SCORE: 1475.04

## 2019-12-04 NOTE — PROGRESS NOTES
[facility]   Clinic Visit s Dec 4, 2019    PCP: Karson Bishop      Hermilo is a 87 year old male who is seen in consultation at the request of Dr. Bishop for   Chief Complaint   Patient presents with     Left Shoulder - Pain       Injury: he fell in Sept 2019     Right hand dominant    Location of Pain: left shoulder anterior , nonradiating   Duration of Pain: acute, 3 week(s),   Rating of Pain at worst: 7/10  Rating of Pain Currently: 3/10  Pain is better with: activity avoidance   Pain is worse with: ADLs:  all activities, lifting and overhead movement  Treatment so far consists of: tylenol  Associated symptoms: no distal numbness or tingling; denies swelling or warmth  Recent imaging completed: X-rays completed 10/1/19.  Prior History of related problems: RTC surgery to bilateral shoulder     He goes on to state he is got pain in both shoulders, the left is worse than the right.    Social History: retired     Past Medical History:   Diagnosis Date     Aortic stenosis     Severe AS, 9/2015 AVR - [facility] TRIFECTA Bovine bioprosthesis 25MM TF-25A     Atrial fibrillation (H)     9/2015 Paroxysmal post op Afib - discharged on Warfarin and a beta blocker     Deep vein thrombosis (H)      GERD (gastroesophageal reflux disease)      Heart murmur      Monoclonal gammopathy     plasmacyte prominent causing monoclonal gammopathy     Need for SBE (subacute bacterial endocarditis) prophylaxis      Neuropathy      Other and unspecified hyperlipidemia      Other malignant lymphomas     non hodgkin's lymphoma     RBBB (right bundle branch block)      Severe sepsis with acute organ dysfunction (H) 11/16/2015     Unspecified hereditary and idiopathic peripheral neuropathy        Patient Active Problem List    Diagnosis Date Noted     Cellulitis 05/13/2019     Priority: Medium     Nodule of lower lobe of right lung 03/26/2019     Priority: Medium     Spongiotic dermatitis 03/01/2019     Priority:  Medium     Iron deficiency anemia due to chronic blood loss 08/30/2018     Priority: Medium     Chronic obstructive pulmonary disease, unspecified COPD type (H) 03/26/2018     Priority: Medium     Hyperlipidemia LDL goal <130 01/10/2018     Priority: Medium     ACP (advance care planning) 10/24/2016     Priority: Medium     Advance Care Planning 10/24/2016: Receipt of ACP document:  Received: Resuscitation Guidelines order which was signed and dated by provider on 8-22-16.  Document previously scanned on 8-22-16.  Order reviewed and found to be valid.  Code Status reflects choices in most recent ACP document.  Confirmed/documented designated decision maker(s).  Added by Heather Reyes RN Advance Care Planning Liaison with [employer]             Lower extremity edema 10/07/2016     Priority: Medium     Leg edema, left 10/07/2016     Priority: Medium     S/P total knee arthroplasty 10/07/2016     Priority: Medium     Drainage from wound: left Knee 08/30/2016     Priority: Medium     Rash 08/29/2016     Priority: Medium     Aftercare following left knee joint replacement surgery 08/22/2016     Priority: Medium     Primary osteoarthritis of left knee 08/19/2016     Priority: Medium     Essential hypertension with goal blood pressure less than 140/90 08/19/2016     Priority: Medium     Non-Hodgkin's lymphoma, unspecified body region, unspecified non-Hodgkin lymphoma type (H) 08/19/2016     Priority: Medium     Anticoagulated on Coumadin 08/19/2016     Priority: Medium     Other chronic pulmonary embolism without acute cor pulmonale (H) 08/19/2016     Priority: Medium     Epistaxis 08/19/2016     Priority: Medium     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 08/19/2016     Priority: Medium     Benign neoplasm of colon 06/14/2016     Priority: Medium     Benign neoplasm of colon, unspecified part of colon 06/10/2016     Priority: Medium     Pulmonary nodules 06/10/2016     Priority: Medium     Long-term  (current) use of anticoagulants [Z79.01] 02/24/2016     Priority: Medium     S/P IVC filter 02/16/2016     Priority: Medium     Removed 10/2016       Pulmonary embolism, bilateral (H) 02/10/2016     Priority: Medium     Primary osteoarthritis of both knees 01/12/2016     Priority: Medium     Primary osteoarthritis of left hip 01/12/2016     Priority: Medium     Gastrointestinal hemorrhage with melena 11/27/2015     Priority: Medium     Need for SBE (subacute bacterial endocarditis) prophylaxis      Priority: Medium     RBBB (right bundle branch block)      Priority: Medium     Physical deconditioning 09/14/2015     Priority: Medium     Paroxysmal atrial fibrillation (H) 09/14/2015     Priority: Medium     Post-op 2015       Anemia due to blood loss, acute 09/11/2015     Priority: Medium     Neuropathy 07/06/2015     Priority: Medium     History of colonic polyps 10/17/2014     Priority: Medium     CARDIOVASCULAR SCREENING; LDL GOAL LESS THAN 130 05/17/2014     Priority: Medium     Health Care Home 08/21/2013     Priority: Medium     Tiana Beatty BS, RN, PHN  [facility] Care Coordinator  [phone]                        Microscopic hematuria 08/19/2013     Priority: Medium     [facility] 08/2013       Non Hodgkin's lymphoma (H): 2013 06/10/2013     Priority: Medium     Dr. Bradley       Nonrheumatic aortic valve stenosis      Priority: Medium      9/2015 AVR - [facility] TRIFECTA Bovine bioprosthesis 25MM TF-25A         GERD (gastroesophageal reflux disease)      Priority: Medium     Ventral hernia 04/17/2013     Priority: Medium       Family History   Problem Relation Age of Onset     C.A.D. Father      Emphysema Father      Melanoma No family hx of      Skin Cancer No family hx of        Social History     Socioeconomic History     Marital status:      Spouse name: Not on file     Number of children: Not on file     Years of education: Not on file     Highest education level: Not on file    Occupational History     Not on file   Social Needs     Financial resource strain: Not on file     Food insecurity:     Worry: Not on file     Inability: Not on file     Transportation needs:     Medical: Not on file     Non-medical: Not on file   Tobacco Use     Smoking status: Former Smoker     Packs/day: 2.00     Years: 55.00     Pack years: 110.00     Last attempt to quit: 1998     Years since quittin.8     Smokeless tobacco: Never Used   Substance and Sexual Activity     Alcohol use: Yes     Alcohol/week: 0.0 standard drinks     Comment: 1 drink per day     Drug use: No     Sexual activity: Not Currently     Partners: Female   Lifestyle     Physical activity:     Days per week: Not on file     Minutes per session: Not on file     Stress: Not on file   Relationships     Social connections:     Talks on phone: Not on file     Gets together: Not on file     Attends [employer] service: Not on file     Active member of club or organization: Not on file     Attends meetings of clubs or organizations: Not on file     Relationship status: Not on file     Intimate partner violence:     Fear of current or ex partner: Not on file     Emotionally abused: Not on file     Physically abused: Not on file     Forced sexual activity: Not on file   Other Topics Concern     Parent/sibling w/ CABG, MI or angioplasty before 65F 55M? Not Asked      Service Not Asked     Blood Transfusions Not Asked     Caffeine Concern No     Comment: occ     Occupational Exposure Not Asked     Hobby Hazards Not Asked     Sleep Concern Not Asked     Stress Concern Not Asked     Weight Concern Not Asked     Special Diet No     Back Care Not Asked     Exercise No     Bike Helmet Not Asked     Seat Belt Yes     Self-Exams Not Asked   Social History Narrative    , [place]    Retired  - self employed       Past Surgical History:   Procedure Laterality Date     APPENDECTOMY       AS TOTAL KNEE  "ARTHROPLASTY       BACK SURGERY       ESOPHAGOSCOPY, GASTROSCOPY, DUODENOSCOPY (EGD), COMBINED N/A 11/28/2015    Procedure: COMBINED ESOPHAGOSCOPY, GASTROSCOPY, DUODENOSCOPY (EGD);  Surgeon: Danis Castillo MD;  Location:  GI     ESOPHAGOSCOPY, GASTROSCOPY, DUODENOSCOPY (EGD), COMBINED N/A 7/26/2018    Procedure: COMBINED ESOPHAGOSCOPY, GASTROSCOPY, DUODENOSCOPY (EGD);;  Surgeon: Toby Dong DO;  Location:  GI     HERNIA REPAIR  2006     HERNIORRHAPHY VENTRAL  4/17/2013    Procedure: HERNIORRHAPHY VENTRAL;  VENTRAL HERNIA REPAIR WITH MESH;  Surgeon: Patel Guzman MD;  Location:  OR     KNEE SURGERY      arthroscopic right knee surgery      LOBECTOMY LUNG Right 3/26/2019    Procedure: POSSIBLE LOBECTOMY LUNG;  Surgeon: Jose A Vasques MD;  Location:  OR     REPAIR LIGAMENT ANKLE  2/23/2012    Procedure:REPAIR LIGAMENT ANKLE; LEFT TARSAL TUNNEL RELEASE OF KNOT OF ARIEL RELEASE; Surgeon:SAUL PUENTE; Location: OR     REPLACE VALVE AORTIC N/A 9/3/2015    Procedure: REPLACE VALVE AORTIC;  Surgeon: Antonino Mitchell MD;  Location:  OR     ROTATOR CUFF REPAIR RT/LT      bilateral     SPINE SURGERY      3 spine surgeries     THORACOTOMY, WEDGE RESECTION LUNG, COMBINED Right 3/26/2019    Procedure: RIGHT THORACOTOMY, WEDGE RESECTION, RIGHT LOWER LOBE LUNG NODULE,;  Surgeon: Jose A Vasques MD;  Location:  OR     TONSILLECTOMY       TURP             Review of Systems   Musculoskeletal: Positive for joint pain.   All other systems reviewed and are negative.        Physical Exam  /54   Ht 1.778 m (5' 10\")   Wt 79.4 kg (175 lb)   BMI 25.11 kg/m    Constitutional:well-developed, well-nourished, and in no distress.   Cardiovascular: Intact distal pulses.    Neurological: alert. Gait Normal:   Gait, station, stance, and balance appear normal for age  Skin: Skin is warm and dry.   Psychiatric: Mood and affect normal.   Respiratory: unlabored, speaks in " full sentences  Lymph: no LAD, no lymphangitis        Right Shoulder Exam     Tenderness   The patient is experiencing no tenderness.    Range of Motion   Active abduction: 170   Passive abduction: 170   Extension: normal   External rotation: normal   Forward flexion: 170   Internal rotation 0 degrees: Sacrum     Muscle Strength   Abduction: 5/5   Internal rotation: 5/5   External rotation: 5/5   Supraspinatus: 5/5   Subscapularis: 5/5   Biceps: 5/5     Tests   Gaviria test: positive  Impingement: positive  Drop arm: negative    Other   Erythema: absent  Scars: present  Sensation: normal  Pulse: present      Left Shoulder Exam     Tenderness   The patient is experiencing no tenderness.     Range of Motion   Active abduction: 160   Passive abduction: 170   Extension: normal   External rotation: normal   Forward flexion: 160   Internal rotation 0 degrees: Sacrum     Muscle Strength   Abduction: 4/5   Internal rotation: 5/5   External rotation: 3/5   Supraspinatus: 3/5   Subscapularis: 5/5   Biceps: 5/5     Tests   Gaviria test: positive  Impingement: positive  Drop arm: positive    Other   Erythema: absent  Scars: absent  Sensation: normal  Pulse: present           X-ray images Previously done and independently reviewed by me in the office today with the patient. X-ray shows:    LEFT SHOULDER THREE VIEWS October 1, 2019 9:49 AM     HISTORY: Left shoulder pain after falling.     COMPARISON: None.     FINDINGS: No fracture or acute abnormality is seen. There is a  surgical suture anchor in the humeral head. There are moderate  degenerative changes of the acromioclavicular joint. Irregularity of  the distal clavicle may be related to an old injury. There are mild  degenerative changes of the glenohumeral articulation. No other  abnormality is seen.                                                                      IMPRESSION: Surgical and degenerative changes with no acute  abnormality seen.      PENNY MAK,  MD      ASSESSMENT/PLAN    ICD-10-CM    1. Acute shoulder pain due to trauma, left M25.512 MR Shoulder Left w/o Contrast    G89.11    2. S/P rotator cuff surgery Z98.890 MR Shoulder Left w/o Contrast   3. Rotator cuff dysfunction, right M67.911 Large Joint Injection/Arthocentesis: R subacromial bursa     Significant pain and weakness about the left shoulder, raises concern for rotator cuff tear.  Potential surgical options exist therefore MRI recommended.  Patient agrees to proceed.  Right shoulder is painful but has reasonable strength, therefore a subacromial injection is offered for pain control patient readily agreed.  He will return after the shoulder MRI to review results and determine next labs.      Large Joint Injection/Arthocentesis: R subacromial bursa  Date/Time: 12/4/2019 12:51 PM  Performed by: Eliot Azul MD  Authorized by: Eliot Azul MD     Indications:  Pain  Needle Size:  25 G  Guidance: landmark guided    Approach:  Posterolateral  Location:  Shoulder      Site:  R subacromial bursa  Medications:  5 mL lidocaine 1 %; 4 mL lidocaine 1 %; 40 mg triamcinolone 40 MG/ML  Outcome:  Tolerated well, no immediate complications  Procedure discussed: discussed risks, benefits, and alternatives    Timeout: timeout called immediately prior to procedure    Prep: patient was prepped and draped in usual sterile fashion

## 2019-12-04 NOTE — TELEPHONE ENCOUNTER
Reason for Call:  Other     Detailed comments: [facility]     Phone Number Patient can be reached at: Home number on file [phone] (home)    Best Time: Anytime     Can we leave a detailed message on this number? YES    Call taken on 12/4/2019 at 1:38 PM by Joshua Martinez

## 2019-12-04 NOTE — TELEPHONE ENCOUNTER
LVM requesting his MRI order be sent to [facility]. It would be more convenient for him. Would like a call back.    Radha Fisher ATC

## 2019-12-04 NOTE — LETTER
12/4/2019         RE: Hermilo Velasquez  [address]  [facility] [address]        Dear Colleague,    Thank you for referring your patient, Hermilo Velasquez, to the  [facility]. Please see a copy of my visit note below.    [facility]   Clinic Visit s Dec 4, 2019    PCP: Karson Bishop      Hermilo is a 87 year old male who is seen in consultation at the request of Dr. Bishpo for   Chief Complaint   Patient presents with     Left Shoulder - Pain       Injury: he fell in Sept 2019     Right hand dominant    Location of Pain: left shoulder anterior , nonradiating   Duration of Pain: acute, 3 week(s),   Rating of Pain at worst: 7/10  Rating of Pain Currently: 3/10  Pain is better with: activity avoidance   Pain is worse with: ADLs:  all activities, lifting and overhead movement  Treatment so far consists of: tylenol  Associated symptoms: no distal numbness or tingling; denies swelling or warmth  Recent imaging completed: X-rays completed 10/1/19.  Prior History of related problems: RTC surgery to bilateral shoulder     He goes on to state he is got pain in both shoulders, the left is worse than the right.    Social History: retired     Past Medical History:   Diagnosis Date     Aortic stenosis     Severe AS, 9/2015 AVR - [facility] TRIFECTA Bovine bioprosthesis 25MM TF-25A     Atrial fibrillation (H)     9/2015 Paroxysmal post op Afib - discharged on Warfarin and a beta blocker     Deep vein thrombosis (H)      GERD (gastroesophageal reflux disease)      Heart murmur      Monoclonal gammopathy     plasmacyte prominent causing monoclonal gammopathy     Need for SBE (subacute bacterial endocarditis) prophylaxis      Neuropathy      Other and unspecified hyperlipidemia      Other malignant lymphomas     non hodgkin's lymphoma     RBBB (right bundle branch block)      Severe sepsis with acute organ dysfunction (H) 11/16/2015     Unspecified hereditary and idiopathic  peripheral neuropathy        Patient Active Problem List    Diagnosis Date Noted     Cellulitis 05/13/2019     Priority: Medium     Nodule of lower lobe of right lung 03/26/2019     Priority: Medium     Spongiotic dermatitis 03/01/2019     Priority: Medium     Iron deficiency anemia due to chronic blood loss 08/30/2018     Priority: Medium     Chronic obstructive pulmonary disease, unspecified COPD type (H) 03/26/2018     Priority: Medium     Hyperlipidemia LDL goal <130 01/10/2018     Priority: Medium     ACP (advance care planning) 10/24/2016     Priority: Medium     Advance Care Planning 10/24/2016: Receipt of ACP document:  Received: Resuscitation Guidelines order which was signed and dated by provider on 8-22-16.  Document previously scanned on 8-22-16.  Order reviewed and found to be valid.  Code Status reflects choices in most recent ACP document.  Confirmed/documented designated decision maker(s).  Added by Heather Reyes RN Advance Care Planning Liaison with [employer]             Lower extremity edema 10/07/2016     Priority: Medium     Leg edema, left 10/07/2016     Priority: Medium     S/P total knee arthroplasty 10/07/2016     Priority: Medium     Drainage from wound: left Knee 08/30/2016     Priority: Medium     Rash 08/29/2016     Priority: Medium     Aftercare following left knee joint replacement surgery 08/22/2016     Priority: Medium     Primary osteoarthritis of left knee 08/19/2016     Priority: Medium     Essential hypertension with goal blood pressure less than 140/90 08/19/2016     Priority: Medium     Non-Hodgkin's lymphoma, unspecified body region, unspecified non-Hodgkin lymphoma type (H) 08/19/2016     Priority: Medium     Anticoagulated on Coumadin 08/19/2016     Priority: Medium     Other chronic pulmonary embolism without acute cor pulmonale (H) 08/19/2016     Priority: Medium     Epistaxis 08/19/2016     Priority: Medium     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  08/19/2016     Priority: Medium     Benign neoplasm of colon 06/14/2016     Priority: Medium     Benign neoplasm of colon, unspecified part of colon 06/10/2016     Priority: Medium     Pulmonary nodules 06/10/2016     Priority: Medium     Long-term (current) use of anticoagulants [Z79.01] 02/24/2016     Priority: Medium     S/P IVC filter 02/16/2016     Priority: Medium     Removed 10/2016       Pulmonary embolism, bilateral (H) 02/10/2016     Priority: Medium     Primary osteoarthritis of both knees 01/12/2016     Priority: Medium     Primary osteoarthritis of left hip 01/12/2016     Priority: Medium     Gastrointestinal hemorrhage with melena 11/27/2015     Priority: Medium     Need for SBE (subacute bacterial endocarditis) prophylaxis      Priority: Medium     RBBB (right bundle branch block)      Priority: Medium     Physical deconditioning 09/14/2015     Priority: Medium     Paroxysmal atrial fibrillation (H) 09/14/2015     Priority: Medium     Post-op 2015       Anemia due to blood loss, acute 09/11/2015     Priority: Medium     Neuropathy 07/06/2015     Priority: Medium     History of colonic polyps 10/17/2014     Priority: Medium     CARDIOVASCULAR SCREENING; LDL GOAL LESS THAN 130 05/17/2014     Priority: Medium     Health Care Home 08/21/2013     Priority: Medium     Tiana Beatty, BS, RN, PHN  hospitals Care Coordinator  [phone]                        Microscopic hematuria 08/19/2013     Priority: Medium     [facility] 08/2013       Non Hodgkin's lymphoma (H): 2013 06/10/2013     Priority: Medium     Dr. Bradley       Nonrheumatic aortic valve stenosis      Priority: Medium      9/2015 AVR - [facility] TRIFECTA Bovine bioprosthesis 25MM TF-25A         GERD (gastroesophageal reflux disease)      Priority: Medium     Ventral hernia 04/17/2013     Priority: Medium       Family History   Problem Relation Age of Onset     C.A.D. Father      Emphysema Father      Melanoma No family hx of       Skin Cancer No family hx of        Social History     Socioeconomic History     Marital status:      Spouse name: Not on file     Number of children: Not on file     Years of education: Not on file     Highest education level: Not on file   Occupational History     Not on file   Social Needs     Financial resource strain: Not on file     Food insecurity:     Worry: Not on file     Inability: Not on file     Transportation needs:     Medical: Not on file     Non-medical: Not on file   Tobacco Use     Smoking status: Former Smoker     Packs/day: 2.00     Years: 55.00     Pack years: 110.00     Last attempt to quit: 1998     Years since quittin.8     Smokeless tobacco: Never Used   Substance and Sexual Activity     Alcohol use: Yes     Alcohol/week: 0.0 standard drinks     Comment: 1 drink per day     Drug use: No     Sexual activity: Not Currently     Partners: Female   Lifestyle     Physical activity:     Days per week: Not on file     Minutes per session: Not on file     Stress: Not on file   Relationships     Social connections:     Talks on phone: Not on file     Gets together: Not on file     Attends Gnosticism service: Not on file     Active member of club or organization: Not on file     Attends meetings of clubs or organizations: Not on file     Relationship status: Not on file     Intimate partner violence:     Fear of current or ex partner: Not on file     Emotionally abused: Not on file     Physically abused: Not on file     Forced sexual activity: Not on file   Other Topics Concern     Parent/sibling w/ CABG, MI or angioplasty before 65F 55M? Not Asked      Service Not Asked     Blood Transfusions Not Asked     Caffeine Concern No     Comment: occ     Occupational Exposure Not Asked     Hobby Hazards Not Asked     Sleep Concern Not Asked     Stress Concern Not Asked     Weight Concern Not Asked     Special Diet No     Back Care Not Asked     Exercise No     Bike Helmet Not Asked      "Seat Belt Yes     Self-Exams Not Asked   Social History Narrative    , [place]    Retired  - self employed       Past Surgical History:   Procedure Laterality Date     APPENDECTOMY       AS TOTAL KNEE ARTHROPLASTY       BACK SURGERY       ESOPHAGOSCOPY, GASTROSCOPY, DUODENOSCOPY (EGD), COMBINED N/A 11/28/2015    Procedure: COMBINED ESOPHAGOSCOPY, GASTROSCOPY, DUODENOSCOPY (EGD);  Surgeon: Danis Castillo MD;  Location:  GI     ESOPHAGOSCOPY, GASTROSCOPY, DUODENOSCOPY (EGD), COMBINED N/A 7/26/2018    Procedure: COMBINED ESOPHAGOSCOPY, GASTROSCOPY, DUODENOSCOPY (EGD);;  Surgeon: Toby Dong DO;  Location:  GI     HERNIA REPAIR  2006     HERNIORRHAPHY VENTRAL  4/17/2013    Procedure: HERNIORRHAPHY VENTRAL;  VENTRAL HERNIA REPAIR WITH MESH;  Surgeon: Patel Guzman MD;  Location:  OR     KNEE SURGERY      arthroscopic right knee surgery      LOBECTOMY LUNG Right 3/26/2019    Procedure: POSSIBLE LOBECTOMY LUNG;  Surgeon: Jose A Vasques MD;  Location:  OR     REPAIR LIGAMENT ANKLE  2/23/2012    Procedure:REPAIR LIGAMENT ANKLE; LEFT TARSAL TUNNEL RELEASE OF KNOT OF ARIEL RELEASE; Surgeon:SAUL PUENTE; Location: OR     REPLACE VALVE AORTIC N/A 9/3/2015    Procedure: REPLACE VALVE AORTIC;  Surgeon: Antonino Mitchell MD;  Location:  OR     ROTATOR CUFF REPAIR RT/LT      bilateral     SPINE SURGERY      3 spine surgeries     THORACOTOMY, WEDGE RESECTION LUNG, COMBINED Right 3/26/2019    Procedure: RIGHT THORACOTOMY, WEDGE RESECTION, RIGHT LOWER LOBE LUNG NODULE,;  Surgeon: Jose A Vasques MD;  Location:  OR     TONSILLECTOMY       TURP             Review of Systems   Musculoskeletal: Positive for joint pain.   All other systems reviewed and are negative.        Physical Exam  /54   Ht 1.778 m (5' 10\")   Wt 79.4 kg (175 lb)   BMI 25.11 kg/m     Constitutional:well-developed, well-nourished, and in no " distress.   Cardiovascular: Intact distal pulses.    Neurological: alert. Gait Normal:   Gait, station, stance, and balance appear normal for age  Skin: Skin is warm and dry.   Psychiatric: Mood and affect normal.   Respiratory: unlabored, speaks in full sentences  Lymph: no LAD, no lymphangitis        Right Shoulder Exam     Tenderness   The patient is experiencing no tenderness.    Range of Motion   Active abduction: 170   Passive abduction: 170   Extension: normal   External rotation: normal   Forward flexion: 170   Internal rotation 0 degrees: Sacrum     Muscle Strength   Abduction: 5/5   Internal rotation: 5/5   External rotation: 5/5   Supraspinatus: 5/5   Subscapularis: 5/5   Biceps: 5/5     Tests   Gaviria test: positive  Impingement: positive  Drop arm: negative    Other   Erythema: absent  Scars: present  Sensation: normal  Pulse: present      Left Shoulder Exam     Tenderness   The patient is experiencing no tenderness.     Range of Motion   Active abduction: 160   Passive abduction: 170   Extension: normal   External rotation: normal   Forward flexion: 160   Internal rotation 0 degrees: Sacrum     Muscle Strength   Abduction: 4/5   Internal rotation: 5/5   External rotation: 3/5   Supraspinatus: 3/5   Subscapularis: 5/5   Biceps: 5/5     Tests   Gaviria test: positive  Impingement: positive  Drop arm: positive    Other   Erythema: absent  Scars: absent  Sensation: normal  Pulse: present           X-ray images Previously done and independently reviewed by me in the office today with the patient. X-ray shows:    LEFT SHOULDER THREE VIEWS October 1, 2019 9:49 AM     HISTORY: Left shoulder pain after falling.     COMPARISON: None.     FINDINGS: No fracture or acute abnormality is seen. There is a  surgical suture anchor in the humeral head. There are moderate  degenerative changes of the acromioclavicular joint. Irregularity of  the distal clavicle may be related to an old injury. There are  mild  degenerative changes of the glenohumeral articulation. No other  abnormality is seen.                                                                      IMPRESSION: Surgical and degenerative changes with no acute  abnormality seen.      PENNY MAK MD      ASSESSMENT/PLAN    ICD-10-CM    1. Acute shoulder pain due to trauma, left M25.512 MR Shoulder Left w/o Contrast    G89.11    2. S/P rotator cuff surgery Z98.890 MR Shoulder Left w/o Contrast   3. Rotator cuff dysfunction, right M67.911 Large Joint Injection/Arthocentesis: R subacromial bursa     Significant pain and weakness about the left shoulder, raises concern for rotator cuff tear.  Potential surgical options exist therefore MRI recommended.  Patient agrees to proceed.  Right shoulder is painful but has reasonable strength, therefore a subacromial injection is offered for pain control patient readily agreed.  He will return after the shoulder MRI to review results and determine next labs.      Large Joint Injection/Arthocentesis: R subacromial bursa  Date/Time: 12/4/2019 12:51 PM  Performed by: Eliot Azul MD  Authorized by: Eliot Azul MD     Indications:  Pain  Needle Size:  25 G  Guidance: landmark guided    Approach:  Posterolateral  Location:  Shoulder      Site:  R subacromial bursa  Medications:  5 mL lidocaine 1 %; 4 mL lidocaine 1 %; 40 mg triamcinolone 40 MG/ML  Outcome:  Tolerated well, no immediate complications  Procedure discussed: discussed risks, benefits, and alternatives    Timeout: timeout called immediately prior to procedure    Prep: patient was prepped and draped in usual sterile fashion            Again, thank you for allowing me to participate in the care of your patient.        Sincerely,        Eliot Azul MD

## 2019-12-04 NOTE — TELEPHONE ENCOUNTER
Spoke with patient - he said this has been taken care of by Dr. Azul's team - he ordered the imaging   Yamilex Gutierrez RN on 12/4/2019 at 3:04 PM

## 2019-12-04 NOTE — PATIENT INSTRUCTIONS
Plan:  - Today's Plan of Care:  Steroid injection of the right shoulder: subacromial space was performed today in clinic    MRI at [facility]: call [phone] to schedule  and Please make a follow up appointment in the clinic at least 2 days following your MRI by calling [phone].    Follow Up: Please make a follow up appointment in the clinic at least 2 days following your MRI by calling [phone].     If you have any further questions for your physician or physician s care team you can call [phone] and use option 2 then 3 to leave a voice message.

## 2019-12-08 RX ORDER — LIDOCAINE HYDROCHLORIDE 10 MG/ML
5 INJECTION, SOLUTION INFILTRATION; PERINEURAL
Status: DISCONTINUED | OUTPATIENT
Start: 2019-12-04 | End: 2019-12-22

## 2019-12-08 RX ORDER — LIDOCAINE HYDROCHLORIDE 10 MG/ML
4 INJECTION, SOLUTION INFILTRATION; PERINEURAL
Status: DISCONTINUED | OUTPATIENT
Start: 2019-12-04 | End: 2019-12-22

## 2019-12-08 RX ORDER — TRIAMCINOLONE ACETONIDE 40 MG/ML
40 INJECTION, SUSPENSION INTRA-ARTICULAR; INTRAMUSCULAR
Status: DISCONTINUED | OUTPATIENT
Start: 2019-12-04 | End: 2019-12-22

## 2019-12-09 ENCOUNTER — TRANSFERRED RECORDS (OUTPATIENT)
Dept: HEALTH INFORMATION MANAGEMENT | Facility: CLINIC | Age: 84
End: 2019-12-09

## 2019-12-10 ENCOUNTER — ANTICOAGULATION THERAPY VISIT (OUTPATIENT)
Dept: NURSING | Facility: CLINIC | Age: 84
End: 2019-12-10
Payer: COMMERCIAL

## 2019-12-10 LAB — INR POINT OF CARE: 2.5 (ref 0.86–1.14)

## 2019-12-10 PROCEDURE — 36416 COLLJ CAPILLARY BLOOD SPEC: CPT

## 2019-12-10 PROCEDURE — 99207 ZZC NO CHARGE NURSE ONLY: CPT

## 2019-12-10 PROCEDURE — 85610 PROTHROMBIN TIME: CPT | Mod: QW

## 2019-12-10 NOTE — PROGRESS NOTES
ANTICOAGULATION FOLLOW-UP CLINIC VISIT    Patient Name:  Hermilo Velasquez  Date:  12/10/2019  Contact Type:  Face to Face    SUBJECTIVE:  Patient Findings     Comments:   The patient was assessed for diet, medication, and activity level changes, missed or extra doses, bruising or bleeding, with no problem findings.        Clinical Outcomes     Negatives:   Major bleeding event, Thromboembolic event, Anticoagulation-related hospital admission, Anticoagulation-related ED visit, Anticoagulation-related fatality    Comments:   The patient was assessed for diet, medication, and activity level changes, missed or extra doses, bruising or bleeding, with no problem findings.           OBJECTIVE    INR Protime   Date Value Ref Range Status   12/10/2019 2.5 (A) 0.86 - 1.14 Final       ASSESSMENT / PLAN  INR assessment THER    Recheck INR In: 6 WEEKS    INR Location Clinic      Anticoagulation Summary  As of 12/10/2019    INR goal:   2.0-3.0   TTR:   49.0 % (11.6 mo)   INR used for dosin.5 (12/10/2019)   Warfarin maintenance plan:   5 mg (5 mg x 1) every day   Full warfarin instructions:   5 mg every day   Weekly warfarin total:   35 mg   Weekly max warfarin dose:   45 mg   No change documented:   Mesha Bah RN   Plan last modified:   Mesha Bah RN (2019)   Next INR check:   2020   Target end date:   Indefinite    Indications    Long-term (current) use of anticoagulants [Z79.01] [Z79.01]  Pulmonary embolism  bilateral (H) [I26.99]  Paroxysmal atrial fibrillation (H) [I48.0]             Anticoagulation Episode Summary     INR check location:   Anticoagulation Clinic    Preferred lab:       Send INR reminders to:   TERRIE JACKSON    Comments:         Anticoagulation Care Providers     Provider Role Specialty Phone number    Karson Bishop MD [facility] Internal Medicine [phone]            See the Encounter Report to view Anticoagulation Flowsheet and Dosing Calendar (Go to Encounters tab in  chart review, and find the Anticoagulation Therapy Visit)    Pt INR is 2.5 today. Pt advised to continue taking 5 mg daily. Recheck INR in 6 weeks or sooner as needed. Hermilo aware if signs of clotting (pain, tenderness, swelling, color change in leg or arm, SOB) and bleeding occur (blood in stool, urine, large bruising, bleeding gums, nosebleeds) to have INR check sooner. If sx severe report to ER or concerned for stroke call 911. If general questions or concerns arise, call clinic.         Mesha Bah RN

## 2019-12-16 ENCOUNTER — OFFICE VISIT (OUTPATIENT)
Dept: ORTHOPEDICS | Facility: CLINIC | Age: 84
End: 2019-12-16
Payer: COMMERCIAL

## 2019-12-16 VITALS
WEIGHT: 175 LBS | BODY MASS INDEX: 25.05 KG/M2 | HEIGHT: 70 IN | SYSTOLIC BLOOD PRESSURE: 127 MMHG | DIASTOLIC BLOOD PRESSURE: 70 MMHG

## 2019-12-16 DIAGNOSIS — M75.82 ROTATOR CUFF TENDINITIS, LEFT: Primary | ICD-10-CM

## 2019-12-16 DIAGNOSIS — Z98.890 S/P ROTATOR CUFF SURGERY: ICD-10-CM

## 2019-12-16 PROCEDURE — 20610 DRAIN/INJ JOINT/BURSA W/O US: CPT | Mod: LT | Performed by: FAMILY MEDICINE

## 2019-12-16 PROCEDURE — 99213 OFFICE O/P EST LOW 20 MIN: CPT | Mod: 25 | Performed by: FAMILY MEDICINE

## 2019-12-16 RX ADMIN — LIDOCAINE HYDROCHLORIDE 5 ML: 10 INJECTION, SOLUTION INFILTRATION; PERINEURAL at 14:27

## 2019-12-16 RX ADMIN — TRIAMCINOLONE ACETONIDE 40 MG: 40 INJECTION, SUSPENSION INTRA-ARTICULAR; INTRAMUSCULAR at 14:27

## 2019-12-16 RX ADMIN — LIDOCAINE HYDROCHLORIDE 4 ML: 10 INJECTION, SOLUTION INFILTRATION; PERINEURAL at 14:27

## 2019-12-16 ASSESSMENT — MIFFLIN-ST. JEOR: SCORE: 1475.04

## 2019-12-16 NOTE — LETTER
12/16/2019         RE: Hermilo Velasquez  [address]  [facility] [address]        Dear Colleague,    Thank you for referring your patient, Hermilo Velasquez, to the  [facility]. Please see a copy of my visit note below.    Musculoskeletal Problem          [facility]   Follow-up Visit s Dec 16, 2019    PCP: Karson Bishop      Subjective:  Hermilo is a 87 year old male who is seen in follow up for evaluation of   Chief Complaint   Patient presents with     Left Shoulder - Pain     His last visit was on 12/4/2019.  Since that time, symptoms have been better than before in right shoulder, same in left shoulder. Hermilo Velasquez is accompanied today by self.       Patient had a shoulder MRI since last visit.      Patient had a right shoulder: subacromial space Cortisone injection on 12/4/2019 that provided 100% relief.      Patient has noticed improved symptoms with injection treatment.    Pain is located left shoulder, persistent.  Patient is using cane.    Patient denies any new injuries.    Patient's past medical, surgical, social and family histories are reviewed today.    History from previous visit on 12/4/2019  Injury: he fell in Sept 2019     Right hand dominant    Location of Pain: left shoulder anterior , nonradiating   Duration of Pain: acute, 3 week(s),   Rating of Pain at worst: 7/10  Rating of Pain Currently: 3/10  Pain is better with: activity avoidance   Pain is worse with: ADLs:  all activities, lifting and overhead movement  Treatment so far consists of: tylenol  Associated symptoms: no distal numbness or tingling; denies swelling or warmth  Recent imaging completed: X-rays completed 10/1/19.  Prior History of related problems: RTC surgery to bilateral shoulder     He goes on to state he is got pain in both shoulders, the left is worse than the right.    Social History: retired     Past Medical History:   Diagnosis Date     Aortic stenosis     Severe  AS, 9/2015 AVR - [facility] TRIFECTA Bovine bioprosthesis 25MM TF-25A     Atrial fibrillation (H)     9/2015 Paroxysmal post op Afib - discharged on Warfarin and a beta blocker     Deep vein thrombosis (H)      GERD (gastroesophageal reflux disease)      Heart murmur      Monoclonal gammopathy     plasmacyte prominent causing monoclonal gammopathy     Need for SBE (subacute bacterial endocarditis) prophylaxis      Neuropathy      Other and unspecified hyperlipidemia      Other malignant lymphomas     non hodgkin's lymphoma     RBBB (right bundle branch block)      Severe sepsis with acute organ dysfunction (H) 11/16/2015     Unspecified hereditary and idiopathic peripheral neuropathy        Patient Active Problem List    Diagnosis Date Noted     Cellulitis 05/13/2019     Priority: Medium     Nodule of lower lobe of right lung 03/26/2019     Priority: Medium     Spongiotic dermatitis 03/01/2019     Priority: Medium     Iron deficiency anemia due to chronic blood loss 08/30/2018     Priority: Medium     Chronic obstructive pulmonary disease, unspecified COPD type (H) 03/26/2018     Priority: Medium     Hyperlipidemia LDL goal <130 01/10/2018     Priority: Medium     ACP (advance care planning) 10/24/2016     Priority: Medium     Advance Care Planning 10/24/2016: Receipt of ACP document:  Received: Resuscitation Guidelines order which was signed and dated by provider on 8-22-16.  Document previously scanned on 8-22-16.  Order reviewed and found to be valid.  Code Status reflects choices in most recent ACP document.  Confirmed/documented designated decision maker(s).  Added by Heather Reyes RN Advance Care Planning Liaison with [employer]             Lower extremity edema 10/07/2016     Priority: Medium     Leg edema, left 10/07/2016     Priority: Medium     S/P total knee arthroplasty 10/07/2016     Priority: Medium     Drainage from wound: left Knee 08/30/2016     Priority: Medium     Rash 08/29/2016     Priority:  Medium     Aftercare following left knee joint replacement surgery 08/22/2016     Priority: Medium     Primary osteoarthritis of left knee 08/19/2016     Priority: Medium     Essential hypertension with goal blood pressure less than 140/90 08/19/2016     Priority: Medium     Non-Hodgkin's lymphoma, unspecified body region, unspecified non-Hodgkin lymphoma type (H) 08/19/2016     Priority: Medium     Anticoagulated on Coumadin 08/19/2016     Priority: Medium     Other chronic pulmonary embolism without acute cor pulmonale (H) 08/19/2016     Priority: Medium     Epistaxis 08/19/2016     Priority: Medium     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 08/19/2016     Priority: Medium     Benign neoplasm of colon 06/14/2016     Priority: Medium     Benign neoplasm of colon, unspecified part of colon 06/10/2016     Priority: Medium     Pulmonary nodules 06/10/2016     Priority: Medium     Long-term (current) use of anticoagulants [Z79.01] 02/24/2016     Priority: Medium     S/P IVC filter 02/16/2016     Priority: Medium     Removed 10/2016       Pulmonary embolism, bilateral (H) 02/10/2016     Priority: Medium     Primary osteoarthritis of both knees 01/12/2016     Priority: Medium     Primary osteoarthritis of left hip 01/12/2016     Priority: Medium     Gastrointestinal hemorrhage with melena 11/27/2015     Priority: Medium     Need for SBE (subacute bacterial endocarditis) prophylaxis      Priority: Medium     RBBB (right bundle branch block)      Priority: Medium     Physical deconditioning 09/14/2015     Priority: Medium     Paroxysmal atrial fibrillation (H) 09/14/2015     Priority: Medium     Post-op 2015       Anemia due to blood loss, acute 09/11/2015     Priority: Medium     Neuropathy 07/06/2015     Priority: Medium     History of colonic polyps 10/17/2014     Priority: Medium     CARDIOVASCULAR SCREENING; LDL GOAL LESS THAN 130 05/17/2014     Priority: Medium     Health Care Home 08/21/2013     Priority:  Medium     ARCADIO Cabral, RN, PHN  A Care Coordinator  [phone]                        Microscopic hematuria 2013     Priority: Medium     [facility] 2013       Non Hodgkin's lymphoma (H): 2013 06/10/2013     Priority: Medium     Dr. Bradley       Nonrheumatic aortic valve stenosis      Priority: Medium      2015 AVR - [facility] TRIFECTA Bovine bioprosthesis 25MM TF-25A         GERD (gastroesophageal reflux disease)      Priority: Medium     Ventral hernia 2013     Priority: Medium       Family History   Problem Relation Age of Onset     C.A.D. Father      Emphysema Father      Melanoma No family hx of      Skin Cancer No family hx of        Social History     Socioeconomic History     Marital status:      Spouse name: Not on file     Number of children: Not on file     Years of education: Not on file     Highest education level: Not on file   Occupational History     Not on file   Social Needs     Financial resource strain: Not on file     Food insecurity:     Worry: Not on file     Inability: Not on file     Transportation needs:     Medical: Not on file     Non-medical: Not on file   Tobacco Use     Smoking status: Former Smoker     Packs/day: 2.00     Years: 55.00     Pack years: 110.00     Last attempt to quit: 1998     Years since quittin.8     Smokeless tobacco: Never Used   Substance and Sexual Activity     Alcohol use: Yes     Alcohol/week: 0.0 standard drinks     Comment: 1 drink per day     Drug use: No     Sexual activity: Not Currently     Partners: Female   Lifestyle     Physical activity:     Days per week: Not on file     Minutes per session: Not on file     Stress: Not on file   Relationships     Social connections:     Talks on phone: Not on file     Gets together: Not on file     Attends Jewish service: Not on file     Active member of club or organization: Not on file     Attends meetings of clubs or organizations: Not on file      Relationship status: Not on file     Intimate partner violence:     Fear of current or ex partner: Not on file     Emotionally abused: Not on file     Physically abused: Not on file     Forced sexual activity: Not on file   Other Topics Concern     Parent/sibling w/ CABG, MI or angioplasty before 65F 55M? Not Asked      Service Not Asked     Blood Transfusions Not Asked     Caffeine Concern No     Comment: occ     Occupational Exposure Not Asked     Hobby Hazards Not Asked     Sleep Concern Not Asked     Stress Concern Not Asked     Weight Concern Not Asked     Special Diet No     Back Care Not Asked     Exercise No     Bike Helmet Not Asked     Seat Belt Yes     Self-Exams Not Asked   Social History Narrative    , [place]    Retired  - self employed       Past Surgical History:   Procedure Laterality Date     APPENDECTOMY       AS TOTAL KNEE ARTHROPLASTY       BACK SURGERY       ESOPHAGOSCOPY, GASTROSCOPY, DUODENOSCOPY (EGD), COMBINED N/A 11/28/2015    Procedure: COMBINED ESOPHAGOSCOPY, GASTROSCOPY, DUODENOSCOPY (EGD);  Surgeon: Danis Castillo MD;  Location:  GI     ESOPHAGOSCOPY, GASTROSCOPY, DUODENOSCOPY (EGD), COMBINED N/A 7/26/2018    Procedure: COMBINED ESOPHAGOSCOPY, GASTROSCOPY, DUODENOSCOPY (EGD);;  Surgeon: Toby Dong DO;  Location:  GI     HERNIA REPAIR  2006     HERNIORRHAPHY VENTRAL  4/17/2013    Procedure: HERNIORRHAPHY VENTRAL;  VENTRAL HERNIA REPAIR WITH MESH;  Surgeon: Patel Guzman MD;  Location:  OR     KNEE SURGERY      arthroscopic right knee surgery      LOBECTOMY LUNG Right 3/26/2019    Procedure: POSSIBLE LOBECTOMY LUNG;  Surgeon: Jose A Vasques MD;  Location:  OR     REPAIR LIGAMENT ANKLE  2/23/2012    Procedure:REPAIR LIGAMENT ANKLE; LEFT TARSAL TUNNEL RELEASE OF KNOT OF ARIEL RELEASE; Surgeon:SAUL PUENTE; Location: OR     REPLACE VALVE AORTIC N/A 9/3/2015    Procedure: REPLACE VALVE  "AORTIC;  Surgeon: Antonino Mitchell MD;  Location: [facility] OR     ROTATOR CUFF REPAIR RT/LT      bilateral     SPINE SURGERY      3 spine surgeries     THORACOTOMY, WEDGE RESECTION LUNG, COMBINED Right 3/26/2019    Procedure: RIGHT THORACOTOMY, WEDGE RESECTION, RIGHT LOWER LOBE LUNG NODULE,;  Surgeon: Jose A Vasques MD;  Location: [facility] OR     TONSILLECTOMY       TURP             Review of Systems   Musculoskeletal: Positive for joint pain.   All other systems reviewed and are negative.        Physical Exam  /70   Ht 1.778 m (5' 10\")   Wt 79.4 kg (175 lb)   BMI 25.11 kg/m     Constitutional:well-developed, well-nourished, and in no distress.   Cardiovascular: Intact distal pulses.    Neurological: alert. Gait Normal:   Gait, station, stance, and balance appear normal for age  Skin: Skin is warm and dry.   Psychiatric: Mood and affect normal.   Respiratory: unlabored, speaks in full sentences  Lymph: no LAD, no lymphangitis        Left Shoulder Exam     Tenderness   The patient is experiencing no tenderness.     Range of Motion   Active abduction: 160   Passive abduction: 170   Extension: normal   External rotation: normal   Forward flexion: 160   Internal rotation 0 degrees: Sacrum     Muscle Strength   Abduction: 4/5   Internal rotation: 5/5   External rotation: 3/5   Supraspinatus: 3/5   Subscapularis: 5/5   Biceps: 5/5     Tests   Gaviria test: positive  Impingement: positive  Drop arm: positive    Other   Erythema: absent  Scars: absent  Sensation: normal  Pulse: present             Final Report  MR MRI SHOULDER WITHOUT CONTRAST    Show Printer-Friendly Version   Patient Name: Hermilo Velasquez  :   ID: [id number]([facility])  Study Date: Dec- 14:53  EXAM: MRI SHOULDER WITHOUT CONTRAST LEFT  LOCATION: [facility]  DATE/TIME: 2019 3:10 PM     INDICATION: Acute left shoulder pain due to trauma. Prior rotator   cuff repair. Evaluate for re-tear of rotator " cuff.  COMPARISON: Radiographs on 10/01/2019.  TECHNIQUE: Unenhanced.     FINDINGS:     ROTATOR CUFF:  There are surgical changes of a rotator cuff repair involving the   anterior aspect of the supraspinatus tendon. A portion of the   adjacent subscapularis is obscured by surgical artifact and there may   have been some repair of this portion of the tendon is well.   Increased signal within the adjacent tendons may be related to   surgical change or mild tendinosis. No current rotator cuff tear is   seen. No fatty atrophy of the musculature is demonstrated.     CORACOACROMIAL ARCH: There is been a subacromial decompression. No   subacromial spurring. No superior subluxation of the humeral head. No   fluid in the subacromial subdeltoid bursa.     ACROMIOCLAVICULAR JOINT: Surgical changes with mild arthrosis.      BICEPS TENDON: Proximal long head biceps tendon intact. No   tendinopathy or tenosynovitis. No subluxation.     GLENOHUMERAL JOINT AND LABRUM: No labral tear identified. Mild   degenerative changes of the glenohumeral articulation. No effusion.     BONES: There are surgical changes of a suture anchor in the lateral   aspect of the humeral head. No other abnormal marrow signal intensity   is seen.     SOFT TISSUES: Normal.     IMPRESSION:  1.  Surgical changes of a prior rotator cuff repair. Mild increased   signal within the supraspinatus and subscapularis may be related to   surgery or mild tendinosis. No current rotator cuff tear is seen.  2.  Subacromial decompression.  3.  Mild degenerative changes of the acromioclavicular joint and   glenohumeral joint.       ASSESSMENT/PLAN    ICD-10-CM    1. Rotator cuff tendinitis, left M75.82    2. S/P rotator cuff surgery Z98.890      Reassurance, rotator cuff intact.  Weakness on exam apparently related to pain with tendinopathy.  He requests left shoulder injection, having had good relief on his right shoulder last week.  Declines physical therapy.  I advised him  that therapy may be needed to achieve full relief.  He deferred for now, opting instead just for the injection for pain control.    Large Joint Injection/Arthocentesis: L subacromial bursa  Date/Time: 12/16/2019 2:27 PM  Performed by: Eliot Azul MD  Authorized by: Eliot Azul MD     Indications:  Pain  Needle Size:  25 G  Guidance: landmark guided    Approach:  Posterolateral  Location:  Shoulder      Site:  L subacromial bursa  Medications:  4 mL lidocaine 1 %; 5 mL lidocaine 1 %; 40 mg triamcinolone 40 MG/ML  Outcome:  Tolerated well, no immediate complications  Procedure discussed: discussed risks, benefits, and alternatives    Timeout: timeout called immediately prior to procedure    Prep: patient was prepped and draped in usual sterile fashion            Again, thank you for allowing me to participate in the care of your patient.        Sincerely,        Eliot Azul MD

## 2019-12-16 NOTE — PROGRESS NOTES
Musculoskeletal Problem          [facility]   Follow-up Visit s Dec 16, 2019    PCP: Karson Bishop      Subjective:  Hermilo is a 87 year old male who is seen in follow up for evaluation of   Chief Complaint   Patient presents with     Left Shoulder - Pain     His last visit was on 12/4/2019.  Since that time, symptoms have been better than before in right shoulder, same in left shoulder. Hermilo Velasquez is accompanied today by self.       Patient had a shoulder MRI since last visit.      Patient had a right shoulder: subacromial space Cortisone injection on 12/4/2019 that provided 100% relief.      Patient has noticed improved symptoms with injection treatment.    Pain is located left shoulder, persistent.  Patient is using cane.    Patient denies any new injuries.    Patient's past medical, surgical, social and family histories are reviewed today.    History from previous visit on 12/4/2019  Injury: he fell in Sept 2019     Right hand dominant    Location of Pain: left shoulder anterior , nonradiating   Duration of Pain: acute, 3 week(s),   Rating of Pain at worst: 7/10  Rating of Pain Currently: 3/10  Pain is better with: activity avoidance   Pain is worse with: ADLs:  all activities, lifting and overhead movement  Treatment so far consists of: tylenol  Associated symptoms: no distal numbness or tingling; denies swelling or warmth  Recent imaging completed: X-rays completed 10/1/19.  Prior History of related problems: RTC surgery to bilateral shoulder     He goes on to state he is got pain in both shoulders, the left is worse than the right.    Social History: retired     Past Medical History:   Diagnosis Date     Aortic stenosis     Severe AS, 9/2015 AVR - [facility] TRIFECTA Bovine bioprosthesis 25MM TF-25A     Atrial fibrillation (H)     9/2015 Paroxysmal post op Afib - discharged on Warfarin and a beta blocker     Deep vein thrombosis (H)      GERD (gastroesophageal reflux disease)       Heart murmur      Monoclonal gammopathy     plasmacyte prominent causing monoclonal gammopathy     Need for SBE (subacute bacterial endocarditis) prophylaxis      Neuropathy      Other and unspecified hyperlipidemia      Other malignant lymphomas     non hodgkin's lymphoma     RBBB (right bundle branch block)      Severe sepsis with acute organ dysfunction (H) 11/16/2015     Unspecified hereditary and idiopathic peripheral neuropathy        Patient Active Problem List    Diagnosis Date Noted     Cellulitis 05/13/2019     Priority: Medium     Nodule of lower lobe of right lung 03/26/2019     Priority: Medium     Spongiotic dermatitis 03/01/2019     Priority: Medium     Iron deficiency anemia due to chronic blood loss 08/30/2018     Priority: Medium     Chronic obstructive pulmonary disease, unspecified COPD type (H) 03/26/2018     Priority: Medium     Hyperlipidemia LDL goal <130 01/10/2018     Priority: Medium     ACP (advance care planning) 10/24/2016     Priority: Medium     Advance Care Planning 10/24/2016: Receipt of ACP document:  Received: Resuscitation Guidelines order which was signed and dated by provider on 8-22-16.  Document previously scanned on 8-22-16.  Order reviewed and found to be valid.  Code Status reflects choices in most recent ACP document.  Confirmed/documented designated decision maker(s).  Added by Heather Reyes RN Advance Care Planning Liaison with [employer]             Lower extremity edema 10/07/2016     Priority: Medium     Leg edema, left 10/07/2016     Priority: Medium     S/P total knee arthroplasty 10/07/2016     Priority: Medium     Drainage from wound: left Knee 08/30/2016     Priority: Medium     Rash 08/29/2016     Priority: Medium     Aftercare following left knee joint replacement surgery 08/22/2016     Priority: Medium     Primary osteoarthritis of left knee 08/19/2016     Priority: Medium     Essential hypertension with goal blood pressure less than 140/90  08/19/2016     Priority: Medium     Non-Hodgkin's lymphoma, unspecified body region, unspecified non-Hodgkin lymphoma type (H) 08/19/2016     Priority: Medium     Anticoagulated on Coumadin 08/19/2016     Priority: Medium     Other chronic pulmonary embolism without acute cor pulmonale (H) 08/19/2016     Priority: Medium     Epistaxis 08/19/2016     Priority: Medium     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 08/19/2016     Priority: Medium     Benign neoplasm of colon 06/14/2016     Priority: Medium     Benign neoplasm of colon, unspecified part of colon 06/10/2016     Priority: Medium     Pulmonary nodules 06/10/2016     Priority: Medium     Long-term (current) use of anticoagulants [Z79.01] 02/24/2016     Priority: Medium     S/P IVC filter 02/16/2016     Priority: Medium     Removed 10/2016       Pulmonary embolism, bilateral (H) 02/10/2016     Priority: Medium     Primary osteoarthritis of both knees 01/12/2016     Priority: Medium     Primary osteoarthritis of left hip 01/12/2016     Priority: Medium     Gastrointestinal hemorrhage with melena 11/27/2015     Priority: Medium     Need for SBE (subacute bacterial endocarditis) prophylaxis      Priority: Medium     RBBB (right bundle branch block)      Priority: Medium     Physical deconditioning 09/14/2015     Priority: Medium     Paroxysmal atrial fibrillation (H) 09/14/2015     Priority: Medium     Post-op 2015       Anemia due to blood loss, acute 09/11/2015     Priority: Medium     Neuropathy 07/06/2015     Priority: Medium     History of colonic polyps 10/17/2014     Priority: Medium     CARDIOVASCULAR SCREENING; LDL GOAL LESS THAN 130 05/17/2014     Priority: Medium     Health Care Home 08/21/2013     Priority: Medium     ARCADIO Cabral, RN, PHN  [facility] Care Coordinator  [phone]                        Microscopic hematuria 08/19/2013     Priority: Medium     [facility] 08/2013       Non Hodgkin's lymphoma (H): 2013  06/10/2013     Priority: Medium     Dr. Bradley       Nonrheumatic aortic valve stenosis      Priority: Medium      2015 AVR - [facility] TRIFECTA Bovine bioprosthesis 25MM TF-25A         GERD (gastroesophageal reflux disease)      Priority: Medium     Ventral hernia 2013     Priority: Medium       Family History   Problem Relation Age of Onset     C.A.D. Father      Emphysema Father      Melanoma No family hx of      Skin Cancer No family hx of        Social History     Socioeconomic History     Marital status:      Spouse name: Not on file     Number of children: Not on file     Years of education: Not on file     Highest education level: Not on file   Occupational History     Not on file   Social Needs     Financial resource strain: Not on file     Food insecurity:     Worry: Not on file     Inability: Not on file     Transportation needs:     Medical: Not on file     Non-medical: Not on file   Tobacco Use     Smoking status: Former Smoker     Packs/day: 2.00     Years: 55.00     Pack years: 110.00     Last attempt to quit: 1998     Years since quittin.8     Smokeless tobacco: Never Used   Substance and Sexual Activity     Alcohol use: Yes     Alcohol/week: 0.0 standard drinks     Comment: 1 drink per day     Drug use: No     Sexual activity: Not Currently     Partners: Female   Lifestyle     Physical activity:     Days per week: Not on file     Minutes per session: Not on file     Stress: Not on file   Relationships     Social connections:     Talks on phone: Not on file     Gets together: Not on file     Attends Taoism service: Not on file     Active member of club or organization: Not on file     Attends meetings of clubs or organizations: Not on file     Relationship status: Not on file     Intimate partner violence:     Fear of current or ex partner: Not on file     Emotionally abused: Not on file     Physically abused: Not on file     Forced sexual activity: Not on file   Other  Topics Concern     Parent/sibling w/ CABG, MI or angioplasty before 65F 55M? Not Asked      Service Not Asked     Blood Transfusions Not Asked     Caffeine Concern No     Comment: occ     Occupational Exposure Not Asked     Hobby Hazards Not Asked     Sleep Concern Not Asked     Stress Concern Not Asked     Weight Concern Not Asked     Special Diet No     Back Care Not Asked     Exercise No     Bike Helmet Not Asked     Seat Belt Yes     Self-Exams Not Asked   Social History Narrative    , [place]    Retired  - self employed       Past Surgical History:   Procedure Laterality Date     APPENDECTOMY       AS TOTAL KNEE ARTHROPLASTY       BACK SURGERY       ESOPHAGOSCOPY, GASTROSCOPY, DUODENOSCOPY (EGD), COMBINED N/A 11/28/2015    Procedure: COMBINED ESOPHAGOSCOPY, GASTROSCOPY, DUODENOSCOPY (EGD);  Surgeon: Danis Castillo MD;  Location:  GI     ESOPHAGOSCOPY, GASTROSCOPY, DUODENOSCOPY (EGD), COMBINED N/A 7/26/2018    Procedure: COMBINED ESOPHAGOSCOPY, GASTROSCOPY, DUODENOSCOPY (EGD);;  Surgeon: Toby Dong DO;  Location:  GI     HERNIA REPAIR  2006     HERNIORRHAPHY VENTRAL  4/17/2013    Procedure: HERNIORRHAPHY VENTRAL;  VENTRAL HERNIA REPAIR WITH MESH;  Surgeon: Patel Guzman MD;  Location:  OR     KNEE SURGERY      arthroscopic right knee surgery      LOBECTOMY LUNG Right 3/26/2019    Procedure: POSSIBLE LOBECTOMY LUNG;  Surgeon: Jose A Vasques MD;  Location:  OR     REPAIR LIGAMENT ANKLE  2/23/2012    Procedure:REPAIR LIGAMENT ANKLE; LEFT TARSAL TUNNEL RELEASE OF KNOT OF ARIEL RELEASE; Surgeon:SAUL PUENTE; Location: OR     REPLACE VALVE AORTIC N/A 9/3/2015    Procedure: REPLACE VALVE AORTIC;  Surgeon: Antonino Mitchell MD;  Location:  OR     ROTATOR CUFF REPAIR RT/LT      bilateral     SPINE SURGERY      3 spine surgeries     THORACOTOMY, WEDGE RESECTION LUNG, COMBINED Right 3/26/2019    Procedure:  "RIGHT THORACOTOMY, WEDGE RESECTION, RIGHT LOWER LOBE LUNG NODULE,;  Surgeon: Jose A Vasques MD;  Location: [facility] OR     TONSILLECTOMY       TURP             Review of Systems   Musculoskeletal: Positive for joint pain.   All other systems reviewed and are negative.        Physical Exam  /70   Ht 1.778 m (5' 10\")   Wt 79.4 kg (175 lb)   BMI 25.11 kg/m    Constitutional:well-developed, well-nourished, and in no distress.   Cardiovascular: Intact distal pulses.    Neurological: alert. Gait Normal:   Gait, station, stance, and balance appear normal for age  Skin: Skin is warm and dry.   Psychiatric: Mood and affect normal.   Respiratory: unlabored, speaks in full sentences  Lymph: no LAD, no lymphangitis        Left Shoulder Exam     Tenderness   The patient is experiencing no tenderness.     Range of Motion   Active abduction: 160   Passive abduction: 170   Extension: normal   External rotation: normal   Forward flexion: 160   Internal rotation 0 degrees: Sacrum     Muscle Strength   Abduction: 4/5   Internal rotation: 5/5   External rotation: 3/5   Supraspinatus: 3/5   Subscapularis: 5/5   Biceps: 5/5     Tests   Gaviria test: positive  Impingement: positive  Drop arm: positive    Other   Erythema: absent  Scars: absent  Sensation: normal  Pulse: present             Final Report  MR MRI SHOULDER WITHOUT CONTRAST    Show Printer-Friendly Version   Patient Name: Hermilo Velasquez  :   ID: [id number]([facility])  Study Date: Dec- 14:53  EXAM: MRI SHOULDER WITHOUT CONTRAST LEFT  LOCATION: [facility]  DATE/TIME: 2019 3:10 PM     INDICATION: Acute left shoulder pain due to trauma. Prior rotator   cuff repair. Evaluate for re-tear of rotator cuff.  COMPARISON: Radiographs on 10/01/2019.  TECHNIQUE: Unenhanced.     FINDINGS:     ROTATOR CUFF:  There are surgical changes of a rotator cuff repair involving the   anterior aspect of the supraspinatus tendon. A portion of the "   adjacent subscapularis is obscured by surgical artifact and there may   have been some repair of this portion of the tendon is well.   Increased signal within the adjacent tendons may be related to   surgical change or mild tendinosis. No current rotator cuff tear is   seen. No fatty atrophy of the musculature is demonstrated.     CORACOACROMIAL ARCH: There is been a subacromial decompression. No   subacromial spurring. No superior subluxation of the humeral head. No   fluid in the subacromial subdeltoid bursa.     ACROMIOCLAVICULAR JOINT: Surgical changes with mild arthrosis.      BICEPS TENDON: Proximal long head biceps tendon intact. No   tendinopathy or tenosynovitis. No subluxation.     GLENOHUMERAL JOINT AND LABRUM: No labral tear identified. Mild   degenerative changes of the glenohumeral articulation. No effusion.     BONES: There are surgical changes of a suture anchor in the lateral   aspect of the humeral head. No other abnormal marrow signal intensity   is seen.     SOFT TISSUES: Normal.     IMPRESSION:  1.  Surgical changes of a prior rotator cuff repair. Mild increased   signal within the supraspinatus and subscapularis may be related to   surgery or mild tendinosis. No current rotator cuff tear is seen.  2.  Subacromial decompression.  3.  Mild degenerative changes of the acromioclavicular joint and   glenohumeral joint.       ASSESSMENT/PLAN    ICD-10-CM    1. Rotator cuff tendinitis, left M75.82    2. S/P rotator cuff surgery Z98.890      Reassurance, rotator cuff intact.  Weakness on exam apparently related to pain with tendinopathy.  He requests left shoulder injection, having had good relief on his right shoulder last week.  Declines physical therapy.  I advised him that therapy may be needed to achieve full relief.  He deferred for now, opting instead just for the injection for pain control.    Large Joint Injection/Arthocentesis: L subacromial bursa  Date/Time: 12/16/2019 2:27 PM  Performed  by: Eliot Azul MD  Authorized by: Eliot Azul MD     Indications:  Pain  Needle Size:  25 G  Guidance: landmark guided    Approach:  Posterolateral  Location:  Shoulder      Site:  L subacromial bursa  Medications:  4 mL lidocaine 1 %; 5 mL lidocaine 1 %; 40 mg triamcinolone 40 MG/ML  Outcome:  Tolerated well, no immediate complications  Procedure discussed: discussed risks, benefits, and alternatives    Timeout: timeout called immediately prior to procedure    Prep: patient was prepped and draped in usual sterile fashion

## 2019-12-17 PROBLEM — M75.82 ROTATOR CUFF TENDINITIS, LEFT: Status: ACTIVE | Noted: 2019-12-17

## 2019-12-17 PROBLEM — Z98.890 S/P ROTATOR CUFF SURGERY: Status: ACTIVE | Noted: 2019-12-17

## 2019-12-17 RX ORDER — LIDOCAINE HYDROCHLORIDE 10 MG/ML
4 INJECTION, SOLUTION INFILTRATION; PERINEURAL
Status: DISCONTINUED | OUTPATIENT
Start: 2019-12-16 | End: 2019-12-22

## 2019-12-17 RX ORDER — TRIAMCINOLONE ACETONIDE 40 MG/ML
40 INJECTION, SUSPENSION INTRA-ARTICULAR; INTRAMUSCULAR
Status: DISCONTINUED | OUTPATIENT
Start: 2019-12-16 | End: 2019-12-22

## 2019-12-17 RX ORDER — LIDOCAINE HYDROCHLORIDE 10 MG/ML
5 INJECTION, SOLUTION INFILTRATION; PERINEURAL
Status: DISCONTINUED | OUTPATIENT
Start: 2019-12-16 | End: 2019-12-22

## 2019-12-22 ENCOUNTER — HOSPITAL ENCOUNTER (INPATIENT)
Facility: CLINIC | Age: 84
LOS: 3 days | Discharge: HOME | DRG: 193 | End: 2019-12-25
Attending: EMERGENCY MEDICINE | Admitting: STUDENT IN AN ORGANIZED HEALTH CARE EDUCATION/TRAINING PROGRAM
Payer: COMMERCIAL

## 2019-12-22 ENCOUNTER — APPOINTMENT (OUTPATIENT)
Dept: GENERAL RADIOLOGY | Facility: CLINIC | Age: 84
DRG: 193 | End: 2019-12-22
Attending: EMERGENCY MEDICINE
Payer: COMMERCIAL

## 2019-12-22 DIAGNOSIS — A41.9 SEPSIS WITHOUT ACUTE ORGAN DYSFUNCTION, DUE TO UNSPECIFIED ORGANISM (H): ICD-10-CM

## 2019-12-22 DIAGNOSIS — J18.9 PNEUMONIA OF BOTH LUNGS DUE TO INFECTIOUS ORGANISM, UNSPECIFIED PART OF LUNG: ICD-10-CM

## 2019-12-22 PROBLEM — L03.90 CELLULITIS: Status: RESOLVED | Noted: 2019-05-13 | Resolved: 2019-12-22

## 2019-12-22 LAB
ALBUMIN SERPL-MCNC: 2.8 G/DL (ref 3.4–5)
ALBUMIN UR-MCNC: 10 MG/DL
ALP SERPL-CCNC: 86 U/L (ref 40–150)
ALT SERPL W P-5'-P-CCNC: 11 U/L (ref 0–70)
ANION GAP SERPL CALCULATED.3IONS-SCNC: <1 MMOL/L (ref 3–14)
APPEARANCE UR: CLEAR
AST SERPL W P-5'-P-CCNC: 11 U/L (ref 0–45)
BASOPHILS # BLD AUTO: 0 10E9/L (ref 0–0.2)
BASOPHILS NFR BLD AUTO: 0.1 %
BILIRUB SERPL-MCNC: 0.7 MG/DL (ref 0.2–1.3)
BILIRUB UR QL STRIP: NEGATIVE
BUN SERPL-MCNC: 19 MG/DL (ref 7–30)
CALCIUM SERPL-MCNC: 8.3 MG/DL (ref 8.5–10.1)
CHLORIDE SERPL-SCNC: 111 MMOL/L (ref 94–109)
CO2 BLDCOV-SCNC: 29 MMOL/L (ref 21–28)
CO2 SERPL-SCNC: 33 MMOL/L (ref 20–32)
COLOR UR AUTO: YELLOW
CREAT SERPL-MCNC: 0.95 MG/DL (ref 0.66–1.25)
DIFFERENTIAL METHOD BLD: ABNORMAL
EOSINOPHIL # BLD AUTO: 0.4 10E9/L (ref 0–0.7)
EOSINOPHIL NFR BLD AUTO: 1.6 %
ERYTHROCYTE [DISTWIDTH] IN BLOOD BY AUTOMATED COUNT: 14 % (ref 10–15)
FLUAV+FLUBV AG SPEC QL: NEGATIVE
FLUAV+FLUBV AG SPEC QL: NEGATIVE
GFR SERPL CREATININE-BSD FRML MDRD: 72 ML/MIN/{1.73_M2}
GLUCOSE SERPL-MCNC: 108 MG/DL (ref 70–99)
GLUCOSE UR STRIP-MCNC: NEGATIVE MG/DL
HCT VFR BLD AUTO: 37.7 % (ref 40–53)
HGB BLD-MCNC: 11.9 G/DL (ref 13.3–17.7)
HGB UR QL STRIP: NEGATIVE
IMM GRANULOCYTES # BLD: 0.1 10E9/L (ref 0–0.4)
IMM GRANULOCYTES NFR BLD: 0.2 %
INR PPP: 1.9 (ref 0.86–1.14)
KETONES UR STRIP-MCNC: NEGATIVE MG/DL
LACTATE BLD-SCNC: 0.8 MMOL/L (ref 0.7–2.1)
LEUKOCYTE ESTERASE UR QL STRIP: NEGATIVE
LYMPHOCYTES # BLD AUTO: 0.7 10E9/L (ref 0.8–5.3)
LYMPHOCYTES NFR BLD AUTO: 2.7 %
MCH RBC QN AUTO: 28.7 PG (ref 26.5–33)
MCHC RBC AUTO-ENTMCNC: 31.6 G/DL (ref 31.5–36.5)
MCV RBC AUTO: 91 FL (ref 78–100)
MONOCYTES # BLD AUTO: 2.2 10E9/L (ref 0–1.3)
MONOCYTES NFR BLD AUTO: 8.9 %
MUCOUS THREADS #/AREA URNS LPF: PRESENT /LPF
NEUTROPHILS # BLD AUTO: 21.4 10E9/L (ref 1.6–8.3)
NEUTROPHILS NFR BLD AUTO: 86.5 %
NITRATE UR QL: NEGATIVE
PCO2 BLDV: 59 MM HG (ref 40–50)
PH BLDV: 7.29 PH (ref 7.32–7.43)
PH UR STRIP: 5.5 PH (ref 5–7)
PLATELET # BLD AUTO: 177 10E9/L (ref 150–450)
PO2 BLDV: 18 MM HG (ref 25–47)
POTASSIUM SERPL-SCNC: 4.4 MMOL/L (ref 3.4–5.3)
PROT SERPL-MCNC: 5.9 G/DL (ref 6.8–8.8)
RBC # BLD AUTO: 4.15 10E12/L (ref 4.4–5.9)
RBC #/AREA URNS AUTO: <1 /HPF (ref 0–2)
SAO2 % BLDV FROM PO2: 21 %
SODIUM SERPL-SCNC: 142 MMOL/L (ref 133–144)
SOURCE: ABNORMAL
SP GR UR STRIP: 1.02 (ref 1–1.03)
SPECIMEN SOURCE: NORMAL
SQUAMOUS #/AREA URNS AUTO: <1 /HPF (ref 0–1)
UROBILINOGEN UR STRIP-MCNC: NORMAL MG/DL (ref 0–2)
WBC # BLD AUTO: 24.7 10E9/L (ref 4–11)
WBC #/AREA URNS AUTO: <1 /HPF (ref 0–5)

## 2019-12-22 PROCEDURE — 83605 ASSAY OF LACTIC ACID: CPT

## 2019-12-22 PROCEDURE — 25000128 H RX IP 250 OP 636: Performed by: EMERGENCY MEDICINE

## 2019-12-22 PROCEDURE — 87040 BLOOD CULTURE FOR BACTERIA: CPT | Performed by: EMERGENCY MEDICINE

## 2019-12-22 PROCEDURE — 96375 TX/PRO/DX INJ NEW DRUG ADDON: CPT

## 2019-12-22 PROCEDURE — 25800030 ZZH RX IP 258 OP 636: Performed by: STUDENT IN AN ORGANIZED HEALTH CARE EDUCATION/TRAINING PROGRAM

## 2019-12-22 PROCEDURE — 87077 CULTURE AEROBIC IDENTIFY: CPT | Performed by: EMERGENCY MEDICINE

## 2019-12-22 PROCEDURE — 99223 1ST HOSP IP/OBS HIGH 75: CPT | Mod: AI | Performed by: STUDENT IN AN ORGANIZED HEALTH CARE EDUCATION/TRAINING PROGRAM

## 2019-12-22 PROCEDURE — 71046 X-RAY EXAM CHEST 2 VIEWS: CPT

## 2019-12-22 PROCEDURE — 25800030 ZZH RX IP 258 OP 636: Performed by: EMERGENCY MEDICINE

## 2019-12-22 PROCEDURE — 80053 COMPREHEN METABOLIC PANEL: CPT | Performed by: EMERGENCY MEDICINE

## 2019-12-22 PROCEDURE — 25000132 ZZH RX MED GY IP 250 OP 250 PS 637: Performed by: STUDENT IN AN ORGANIZED HEALTH CARE EDUCATION/TRAINING PROGRAM

## 2019-12-22 PROCEDURE — 85025 COMPLETE CBC W/AUTO DIFF WBC: CPT | Performed by: EMERGENCY MEDICINE

## 2019-12-22 PROCEDURE — 96361 HYDRATE IV INFUSION ADD-ON: CPT

## 2019-12-22 PROCEDURE — 85610 PROTHROMBIN TIME: CPT | Performed by: EMERGENCY MEDICINE

## 2019-12-22 PROCEDURE — 87804 INFLUENZA ASSAY W/OPTIC: CPT | Performed by: EMERGENCY MEDICINE

## 2019-12-22 PROCEDURE — 87186 SC STD MICRODIL/AGAR DIL: CPT | Performed by: EMERGENCY MEDICINE

## 2019-12-22 PROCEDURE — 87800 DETECT AGNT MULT DNA DIREC: CPT | Performed by: EMERGENCY MEDICINE

## 2019-12-22 PROCEDURE — 81001 URINALYSIS AUTO W/SCOPE: CPT | Performed by: EMERGENCY MEDICINE

## 2019-12-22 PROCEDURE — 99285 EMERGENCY DEPT VISIT HI MDM: CPT | Mod: 25

## 2019-12-22 PROCEDURE — 25000128 H RX IP 250 OP 636: Performed by: STUDENT IN AN ORGANIZED HEALTH CARE EDUCATION/TRAINING PROGRAM

## 2019-12-22 PROCEDURE — 12000000 ZZH R&B MED SURG/OB

## 2019-12-22 PROCEDURE — 82803 BLOOD GASES ANY COMBINATION: CPT

## 2019-12-22 PROCEDURE — 96365 THER/PROPH/DIAG IV INF INIT: CPT

## 2019-12-22 RX ORDER — SODIUM CHLORIDE, SODIUM LACTATE, POTASSIUM CHLORIDE, CALCIUM CHLORIDE 600; 310; 30; 20 MG/100ML; MG/100ML; MG/100ML; MG/100ML
INJECTION, SOLUTION INTRAVENOUS CONTINUOUS
Status: ACTIVE | OUTPATIENT
Start: 2019-12-22 | End: 2019-12-23

## 2019-12-22 RX ORDER — GABAPENTIN 300 MG/1
600 CAPSULE ORAL AT BEDTIME
COMMUNITY
End: 2020-03-22

## 2019-12-22 RX ORDER — ONDANSETRON 2 MG/ML
4 INJECTION INTRAMUSCULAR; INTRAVENOUS EVERY 6 HOURS PRN
Status: DISCONTINUED | OUTPATIENT
Start: 2019-12-22 | End: 2019-12-25 | Stop reason: HOSPADM

## 2019-12-22 RX ORDER — ATORVASTATIN CALCIUM 10 MG/1
10 TABLET, FILM COATED ORAL DAILY
Status: DISCONTINUED | OUTPATIENT
Start: 2019-12-23 | End: 2019-12-25 | Stop reason: HOSPADM

## 2019-12-22 RX ORDER — GABAPENTIN 600 MG/1
600 TABLET ORAL EVERY EVENING
Status: DISCONTINUED | OUTPATIENT
Start: 2019-12-22 | End: 2019-12-25 | Stop reason: HOSPADM

## 2019-12-22 RX ORDER — WARFARIN SODIUM 5 MG/1
5 TABLET ORAL
Status: COMPLETED | OUTPATIENT
Start: 2019-12-22 | End: 2019-12-22

## 2019-12-22 RX ORDER — ACETAMINOPHEN 325 MG/1
650 TABLET ORAL EVERY 4 HOURS PRN
Status: DISCONTINUED | OUTPATIENT
Start: 2019-12-22 | End: 2019-12-25 | Stop reason: HOSPADM

## 2019-12-22 RX ORDER — FAMOTIDINE 20 MG/1
40 TABLET, FILM COATED ORAL 2 TIMES DAILY PRN
Status: DISCONTINUED | OUTPATIENT
Start: 2019-12-22 | End: 2019-12-25 | Stop reason: HOSPADM

## 2019-12-22 RX ORDER — AZITHROMYCIN 500 MG/1
500 INJECTION, POWDER, LYOPHILIZED, FOR SOLUTION INTRAVENOUS EVERY 24 HOURS
Status: DISCONTINUED | OUTPATIENT
Start: 2019-12-23 | End: 2019-12-25 | Stop reason: HOSPADM

## 2019-12-22 RX ORDER — GABAPENTIN 300 MG/1
300 CAPSULE ORAL EVERY MORNING
Status: DISCONTINUED | OUTPATIENT
Start: 2019-12-23 | End: 2019-12-25 | Stop reason: HOSPADM

## 2019-12-22 RX ORDER — METOPROLOL SUCCINATE 50 MG/1
50 TABLET, EXTENDED RELEASE ORAL DAILY
Status: DISCONTINUED | OUTPATIENT
Start: 2019-12-23 | End: 2019-12-25 | Stop reason: HOSPADM

## 2019-12-22 RX ORDER — LIDOCAINE 40 MG/G
CREAM TOPICAL
Status: DISCONTINUED | OUTPATIENT
Start: 2019-12-22 | End: 2019-12-22

## 2019-12-22 RX ORDER — NALOXONE HYDROCHLORIDE 0.4 MG/ML
.1-.4 INJECTION, SOLUTION INTRAMUSCULAR; INTRAVENOUS; SUBCUTANEOUS
Status: DISCONTINUED | OUTPATIENT
Start: 2019-12-22 | End: 2019-12-25 | Stop reason: HOSPADM

## 2019-12-22 RX ORDER — AZITHROMYCIN 500 MG/1
500 INJECTION, POWDER, LYOPHILIZED, FOR SOLUTION INTRAVENOUS ONCE
Status: COMPLETED | OUTPATIENT
Start: 2019-12-22 | End: 2019-12-22

## 2019-12-22 RX ORDER — CEFTRIAXONE 1 G/1
1 INJECTION, POWDER, FOR SOLUTION INTRAMUSCULAR; INTRAVENOUS EVERY 24 HOURS
Status: DISCONTINUED | OUTPATIENT
Start: 2019-12-22 | End: 2019-12-25 | Stop reason: HOSPADM

## 2019-12-22 RX ORDER — ONDANSETRON 4 MG/1
4 TABLET, ORALLY DISINTEGRATING ORAL EVERY 6 HOURS PRN
Status: DISCONTINUED | OUTPATIENT
Start: 2019-12-22 | End: 2019-12-25 | Stop reason: HOSPADM

## 2019-12-22 RX ADMIN — CEFTRIAXONE SODIUM 1 G: 1 INJECTION, POWDER, FOR SOLUTION INTRAMUSCULAR; INTRAVENOUS at 17:29

## 2019-12-22 RX ADMIN — GABAPENTIN 600 MG: 600 TABLET, FILM COATED ORAL at 20:07

## 2019-12-22 RX ADMIN — WARFARIN SODIUM 5 MG: 5 TABLET ORAL at 17:29

## 2019-12-22 RX ADMIN — SODIUM CHLORIDE, POTASSIUM CHLORIDE, SODIUM LACTATE AND CALCIUM CHLORIDE 500 ML: 600; 310; 30; 20 INJECTION, SOLUTION INTRAVENOUS at 14:30

## 2019-12-22 RX ADMIN — AZITHROMYCIN MONOHYDRATE 500 MG: 500 INJECTION, POWDER, LYOPHILIZED, FOR SOLUTION INTRAVENOUS at 16:09

## 2019-12-22 RX ADMIN — SODIUM CHLORIDE, POTASSIUM CHLORIDE, SODIUM LACTATE AND CALCIUM CHLORIDE: 600; 310; 30; 20 INJECTION, SOLUTION INTRAVENOUS at 17:29

## 2019-12-22 RX ADMIN — CEFEPIME HYDROCHLORIDE 2 G: 2 INJECTION, POWDER, FOR SOLUTION INTRAVENOUS at 15:18

## 2019-12-22 ASSESSMENT — ENCOUNTER SYMPTOMS
SPEECH DIFFICULTY: 1
SHORTNESS OF BREATH: 0
VOMITING: 0
DIFFICULTY URINATING: 0
FATIGUE: 1
TREMORS: 1
WEAKNESS: 1
DIARRHEA: 0
FEVER: 1

## 2019-12-22 ASSESSMENT — ACTIVITIES OF DAILY LIVING (ADL): ADLS_ACUITY_SCORE: 18

## 2019-12-22 ASSESSMENT — MIFFLIN-ST. JEOR: SCORE: 1497.72

## 2019-12-22 NOTE — ED PROVIDER NOTES
History     Chief Complaint:  Fever     HPI  Hermilo Velasquez is a 87 year old male with history of atrial fibrillation on Coumadin, non-Hodgkin's lymphoma, COPD who presents today with a fever. The patient states this morning at 0830 he had trouble getting out of bed secondary to weakness and tremors of extremities. His wife states that his speech was slurred, but here in the ED it has improved following supplemental oxygen.  He notes left leg swelling and a productive cough in the last week. Here in the ED he has a fever of 100.5F. He states prior to bed last night he was baseline. He denies chest pain, shortness of breath, vomiting, urine issues or diarrhea. He states he received a flu shot this year.    Allergies:  Lidocaine  Omeprazole  Pantoprazole  Prevacid [Lansoprazole]  Lasix [Furosemide]  Penicillins     Medications:    Albuterol  Lipitor  Gabapentin  Hydroxyzine  Metoprolol  Ranitidine  Spiriva  Warfarin     Past Medical History:    Spongiotic dermatitis  Anemia  COPD  HLD  HTN  Non-hodgkin's lymphoma  PE  Osteoarthritis  Atrial fibrillation  GERD  Hernia  Aortic stenosis  DVT  Heart murmur  Monoclonal gammopathy  Neuropathy  Malignant lymphomas  RBBB  Severe sepsis with acute organ dysfunction     Past Surgical History:    Appendectomy  Back surgery  Hernia repair  Herniorrhaphy  Lobectomy  Left ankle ligament repair  Replace aortic valve  Bilateral rotator cuff repair  Spine surgery x3  Thoracotomy  Tonsillectomy  TURP     Family History:    Father - CAD, emphysema    Social History:  The patient was accompanied to the ED with his wife.  Smoking Status: Former  Smokeless Tobacco: Never  Alcohol Use: Yes   Drug Use: No   PCP: Karson Bishop   Marital Status:     Review of Systems   Constitutional: Positive for fatigue and fever.   Respiratory: Negative for shortness of breath.    Cardiovascular: Negative for chest pain.   Gastrointestinal: Negative for diarrhea and vomiting.   Genitourinary:  "Negative for difficulty urinating.   Neurological: Positive for tremors, speech difficulty and weakness.   All other systems reviewed and are negative.       Physical Exam     Patient Vitals for the past 24 hrs:   BP Temp Temp src Pulse Heart Rate Resp SpO2 Height Weight   12/22/19 1615 -- 99.8  F (37.7  C) Oral -- -- -- -- -- --   12/22/19 1600 117/57 -- -- 89 -- -- 93 % -- --   12/22/19 1500 114/50 -- -- 76 -- -- 100 % -- --   12/22/19 1400 -- -- -- -- -- -- 97 % -- --   12/22/19 1335 -- -- -- -- -- -- 95 % -- --   12/22/19 1330 109/74 100.5  F (38.1  C) Oral 85 85 20 (!) 87 % 1.778 m (5' 10\") 81.6 kg (180 lb)        Physical Exam     Gen: Pleasant, elderly and chronically ill male.  Accompanied by wife.    Eye:   Pupils are equal, round, and reactive.     Sclera non-injected.    ENT:   Dry mucus membranes.     Normal tongue.    Oropharynx without lesions.    Cardiac:     Normal rate and irregular rhythm.    No murmurs, gallops, or rubs.    Pulmonary:     Clear to auscultation bilaterally.    No wheezes, rales, or rhonchi.    Abdomen:     Normal active bowel sounds.     Abdomen is soft and non-distended, without focal tenderness.    Musculoskeletal:     Normal movement of all extremities without evidence for deficit.   Calves are non-tender.    Extremities:    No edema.  Appear symmetric bilaterally.    Skin:   Warm and dry.    Neurologic:    Non-focal exam without asymmetric weakness or numbness.    Normal tone   Speech sounds slurred, though baseline per wife.    Psychiatric:     Normal affect with appropriate interaction with examiner.    Emergency Department Course     Imaging:  Radiology results were communicated with the patient who voiced understanding of the findings.    XR Chest 2 views:   IMPRESSION: Suggestion of new patchy bibasilar opacities. Pneumonia is  a possibility. Hypoinflated lungs. Stable sternotomy. Cardiac  silhouette appears mildly larger, as per radiology.    Laboratory:  Laboratory " results were communicated with the patient who voiced understanding of the findings.    Blood cultures: pending x2    Influenza A/B Antigen: A/B negative    CBC: WBC: 24.7 (H), HGB: 11.9 (L), PLT: 177  BMP: Glucose: 108 (H), Chloride: 111 (H), CO2: 33 (H), Anion Gap: <1 (L), calcium: 8.3 (L), o/w WNL (Creatinine: 0.95)  INR: 1.90 (H)    ISTAT VBG: pH 7.29 (H) / PCO2 59 (H) / PO2 18 (L) / Bicarb 29 (H) / O2 sat 21 / lactic acid 0.8    Interventions:  1430 Lactated Bolus 500 mLs IV   1518 Maxipime 2 g IV    Emergency Department Course:  Nursing notes and vitals reviewed. (1:39 PM) I performed an exam of the patient as documented above.     IV inserted, blood and Influenza swab sent to the lab for further testing, results above.     Medicine administered as documented above.    The patient was sent for XR while in the emergency department, results above.      1524 I consulted with Dr. Fajardo of the hospitalist services. They are in agreement to accept the patient for admission.    1616 I rechecked the patient and discussed the results of their workup thus far.    Findings and plan explained to the Patient who consents to admission. Discussed the patient with Dr. Fajardo, who will admit the patient to a med/surg bed for further monitoring, evaluation, and treatment.    Impression & Plan       Medical Decision Making:  Hermilo Tyson is an 87-year-old male with history of atrial fibrillation on Coumadin, non-Hodgkin's lymphoma, COPD who presents today with difficulty breathing, hypoxia, and generalized weakness.  On arrival to the ED, he was febrile to 100.5 with hypoxia.  He has not been hospitalized for the last 3 months.  Chest radiograph demonstrates bilateral infiltrates continue consistent with pneumonia.  He has a normal lactate, however oxygen on his VBG was quite low.  He has a market leukocytosis with left shift consistent with a bacterial pneumonia.  He was started on broad-spectrum antibiotics with cefepime and  azithromycin for atypical isms.  He is remained hemodynamically stable here in the ED.  Given normal lactate, he was not aggressively fluid resuscitated.  Blood pressures have remained stable.  He is saturating well on 3-1/2 L.  He is tachypneic but otherwise not with severe the increased work of breathing.  At this point, I do not think BiPAP is indicated.  He will be brought in for continued resuscitation, IV antibiotics, and supplemental oxygen.    Diagnosis:    ICD-10-CM    1. Pneumonia of both lungs due to infectious organism, unspecified part of lung J18.9 Influenza A/B antigen     Comprehensive Panel Addition     INR   2. Sepsis without acute organ dysfunction, due to unspecified organism (H) A41.9       Disposition:  Admitted to Dr. Fajardo.     Scribe Disclosure:  I, Kenan Dobbins, am serving as a scribe at 1:39 PM on 12/22/2019 to document services personally performed by Monica Russell MD based on my observations and the provider's statements to me.      12/22/2019    EMERGENCY DEPARTMENT       Monica Russell MD  12/22/19 [id number]

## 2019-12-22 NOTE — PHARMACY-ADMISSION MEDICATION HISTORY
Pharmacy Medication History  Admission medication history interview status for the 12/22/2019  admission is complete. See EPIC admission navigator for prior to admission medications     Medication history sources: Patient and Surescripts  Medication history source reliability: Poor, patient and his wife disagreed on many of the medications.  The patient answered yes to multiple questions.  Prescriptions were reviewed as best we could with clinic notes.   Adherence assessment: Poor patient did not have insight into his medications.     Significant changes made to the medication list:  - Doxepin was not recognized by either the wife or the patient so it was discontinued.  Removed from the med list, last dispense was 5/2019.       Additional medication history information:   - Patient takes warfarin 5 mg every day.   - Clarification of the dose of gabapentin was difficult. Patient states he takes 300 mg in the morning and 600 mg in the afternoon, wife states that she thinks that he takes 900 at bedtime.     Medication reconciliation completed by provider prior to medication history? No    Time spent in this activity: 45 minutes    Prior to Admission medications    Medication Sig Last Dose Taking? Auth Provider   albuterol (PROAIR HFA/PROVENTIL HFA/VENTOLIN HFA) 108 (90 Base) MCG/ACT Inhaler Inhale 1-2 puffs into the lungs every 6 hours as needed for shortness of breath / dyspnea or wheezing Past Month at Unknown time Yes Karson Bishop MD   atorvastatin (LIPITOR) 10 MG tablet Take 1 tablet (10 mg) by mouth daily 12/22/2019 at Unknown time Yes Tab Grijalva MD   famotidine (PEPCID) 40 MG tablet Take 1 tablet (40 mg) by mouth 2 times daily as needed (heartburn) 12/22/2019 at Unknown time Yes Karson Bishop MD   ferrous sulfate (FEROSUL) 325 (65 Fe) MG tablet Take 1 tablet (325 mg) by mouth every other day 12/22/2019 at Unknown time Yes Nelson Mao APRN CNP   gabapentin (NEURONTIN) 300 MG capsule Take 600  mg by mouth At Bedtime Takes 300 mg in the morning and 600 in the evening 12/21/2019 at Unknown time Yes Unknown, Entered By History   gabapentin (NEURONTIN) 300 MG capsule Take 300 mg by mouth daily Takes 300 mg in the morning and 600 in the evening 12/22/2019 at Unknown time Yes Unknown, Entered By History   HYDROcodone-acetaminophen (NORCO) 5-325 MG tablet Take 1 tablet by mouth every 6 hours as needed for pain Past Week at Unknown time Yes Karson Bishop MD   hydrOXYzine (ATARAX) 25 MG tablet Take 2 tablets (50 mg) by mouth every 6 hours as needed Past Week at Unknown time Yes Karson Bishop MD   loratadine (CLARITIN) 10 MG tablet Take 10 mg by mouth daily as needed for allergies  12/22/2019 at Unknown time Yes Reported, Patient   metoprolol succinate ER (TOPROL-XL) 100 MG 24 hr tablet Take 0.5 tablets (50 mg) by mouth daily 12/22/2019 at Unknown time Yes Tab Grijalva MD   tiotropium (SPIRIVA HANDIHALER) 18 MCG capsule Inhale 1 capsule (18 mcg) into the lungs daily 12/22/2019 at Unknown time Yes Karson Bishop MD   triamcinolone (KENALOG) 0.1 % cream Apply topically 2 times daily as needed for irritation Past Month at Unknown time Yes Unknown, Entered By History   warfarin ANTICOAGULANT (COUMADIN) 5 MG tablet Take 1 tablet (5mg) daily Or as directed by INR nurses 12/21/2019 at Unknown time Yes Karson Bishop MD   order for DME Equipment being ordered: Right neoprene knee brace.  ( Fax to [facility] fax  [phone]) DME at [facility] [place]  Karson Bishop MD

## 2019-12-22 NOTE — H&P
[facility]    History and Physical - Hospitalist Service       Date of Admission:  12/22/2019    Assessment & Plan   Hermilo Velasquez is a 87 year old male admitted on 12/22/2019. He presents with fever.       CAP    Acute Hypoxic Respiratory Failure    Chronic obstructive pulmonary disease, unspecified COPD type (H)    Assessment: presents with fever 100.5F in ED, along with generalized weakness. CXR   shows new patchy bibasilar opacities, likely new pneumonia. Hemodynamics stable. No wheezing on exam.     Plan:   - admit to inpatient  - IV Ceftriaxone and Azithromycin  - Supplemental O2 as needed  - Follow vitals/temp  - Follow CBC/fever curve  - Broaden antibiotics as needed  - Duonebs QID and as needed      GERD (gastroesophageal reflux disease)    Assessment/Plan: PPI as needed      Non Hodgkin's lymphoma (H): 2013    Assessment/Plan: stable, follow as outpatient      Paroxysmal atrial fibrillation (H)    Other chronic pulmonary embolism without acute cor pulmonale (H)    Assessment/Plan: continue warfarin, pharmacy to dose. Continue PTA BB      Essential hypertension with goal blood pressure less than 140/90    Assessment: PTA patient is on BB    Plan:   - Continue PTA BB in AM      Hyperlipidemia LDL goal <130    Assessment/Plan: continue PTA Lipitor        Diet: Combination Diet Regular Diet Adult    DVT Prophylaxis: Warfarin  Suaoz Catheter: not present  Code Status: Full Code      Disposition Plan   Expected discharge: 2 - 3 days, recommended to prior living arrangement once antibiotic plan established and O2 use less than 2 liters/minute.  Entered: Quintin Fajardo MD 12/22/2019, 4:57 PM     The patient's care was discussed with the Patient and ED Provider.    Quintin Fajardo MD  [facility]    ______________________________________________________________________    Chief Complaint     Fever    History is obtained from the patient    History of Present Illness      Hermilo MADRID  Eva is a 87 year old male with past medical history of non-Hodgkin's lymphoma, COPD, atrial fibrillation on warfarin, hypertension, hyperlipidemia who presents for evaluation of fever and generalized weakness.    Patient ports that on the morning admission, he had significant generalized weakness that prohibited him from getting out of bed.  He also reports of developing chills and rigors.  His wife noticed that his speech was slurred, thus brought the patient into the emergency department for further evaluation.  He does endorse a productive cough that has worsened over the past week.  He otherwise denies any chest pain.  He denies any shortness of breath actually.  He denies any recent diarrhea, blood in stool, weight loss or night sweats.  He denies any recent sick contacts.  Denies any history of dysphasia or recent episodes of aspiration.  He denies any worsening lower extremity edema, he does have baseline left lower extremity edema.  He denies any PND/orthopnea.  He is not on supplemental oxygen at baseline.  He denies any wheezing.  He denies any dysuria, urinary urgency or frequency.  He otherwise has no other complaints this time.    Review of Systems    The 10 point Review of Systems is negative other than noted in the HPI or here.     Past Medical History    I have reviewed this patient's medical history and updated it with pertinent information if needed.   Past Medical History:   Diagnosis Date     Aortic stenosis     Severe AS, 9/2015 AVR - [facility] TRIFECTA Bovine bioprosthesis 25MM TF-[id number]     Atrial fibrillation (H)     9/2015 Paroxysmal post op Afib - discharged on Warfarin and a beta blocker     Deep vein thrombosis (H)      GERD (gastroesophageal reflux disease)      Heart murmur      Monoclonal gammopathy     plasmacyte prominent causing monoclonal gammopathy     Need for SBE (subacute bacterial endocarditis) prophylaxis      Neuropathy      Other and unspecified hyperlipidemia      Other  malignant lymphomas     non hodgkin's lymphoma     RBBB (right bundle branch block)      Severe sepsis with acute organ dysfunction (H) 11/16/2015     Unspecified hereditary and idiopathic peripheral neuropathy        Past Surgical History   I have reviewed this patient's surgical history and updated it with pertinent information if needed.  Past Surgical History:   Procedure Laterality Date     APPENDECTOMY       AS TOTAL KNEE ARTHROPLASTY       BACK SURGERY       ESOPHAGOSCOPY, GASTROSCOPY, DUODENOSCOPY (EGD), COMBINED N/A 11/28/2015    Procedure: COMBINED ESOPHAGOSCOPY, GASTROSCOPY, DUODENOSCOPY (EGD);  Surgeon: Danis Castillo MD;  Location:  GI     ESOPHAGOSCOPY, GASTROSCOPY, DUODENOSCOPY (EGD), COMBINED N/A 7/26/2018    Procedure: COMBINED ESOPHAGOSCOPY, GASTROSCOPY, DUODENOSCOPY (EGD);;  Surgeon: Toby Dong DO;  Location:  GI     HERNIA REPAIR  2006     HERNIORRHAPHY VENTRAL  4/17/2013    Procedure: HERNIORRHAPHY VENTRAL;  VENTRAL HERNIA REPAIR WITH MESH;  Surgeon: Patel Guzman MD;  Location:  OR     KNEE SURGERY      arthroscopic right knee surgery      LOBECTOMY LUNG Right 3/26/2019    Procedure: POSSIBLE LOBECTOMY LUNG;  Surgeon: Jose A Vasques MD;  Location:  OR     REPAIR LIGAMENT ANKLE  2/23/2012    Procedure:REPAIR LIGAMENT ANKLE; LEFT TARSAL TUNNEL RELEASE OF KNOT OF ARIEL RELEASE; Surgeon:SAUL PUENTE; Location: OR     REPLACE VALVE AORTIC N/A 9/3/2015    Procedure: REPLACE VALVE AORTIC;  Surgeon: Antonino Mitchell MD;  Location:  OR     ROTATOR CUFF REPAIR RT/LT      bilateral     SPINE SURGERY      3 spine surgeries     THORACOTOMY, WEDGE RESECTION LUNG, COMBINED Right 3/26/2019    Procedure: RIGHT THORACOTOMY, WEDGE RESECTION, RIGHT LOWER LOBE LUNG NODULE,;  Surgeon: Jose A Vasques MD;  Location:  OR     TONSILLECTOMY       TURP         Social History   I have reviewed this patient's social history and updated it with  pertinent information if needed.  Social History     Tobacco Use     Smoking status: Former Smoker     Packs/day: 2.00     Years: 55.00     Pack years: 110.00     Last attempt to quit: 1998     Years since quittin.9     Smokeless tobacco: Never Used   Substance Use Topics     Alcohol use: Yes     Alcohol/week: 0.0 standard drinks     Comment: 1 drink per day     Drug use: No       Family History   I have reviewed this patient's family history and updated it with pertinent information if needed.   Family History   Problem Relation Age of Onset     C.A.D. Father      Emphysema Father      Melanoma No family hx of      Skin Cancer No family hx of      Prior to Admission Medications   Prior to Admission Medications   Prescriptions Last Dose Informant Patient Reported? Taking?   HYDROcodone-acetaminophen (NORCO) 5-325 MG tablet Past Week at Unknown time  No Yes   Sig: Take 1 tablet by mouth every 6 hours as needed for pain   albuterol (PROAIR HFA/PROVENTIL HFA/VENTOLIN HFA) 108 (90 Base) MCG/ACT Inhaler Past Month at Unknown time Self No Yes   Sig: Inhale 1-2 puffs into the lungs every 6 hours as needed for shortness of breath / dyspnea or wheezing   atorvastatin (LIPITOR) 10 MG tablet 2019 at Unknown time  No Yes   Sig: Take 1 tablet (10 mg) by mouth daily   famotidine (PEPCID) 40 MG tablet 2019 at Unknown time  No Yes   Sig: Take 1 tablet (40 mg) by mouth 2 times daily as needed (heartburn)   ferrous sulfate (FEROSUL) 325 (65 Fe) MG tablet 2019 at Unknown time Self No Yes   Sig: Take 1 tablet (325 mg) by mouth every other day   gabapentin (NEURONTIN) 300 MG capsule 2019 at Unknown time Self Yes Yes   Sig: Take 300 mg by mouth daily Takes 300 mg in the morning and 600 in the evening   gabapentin (NEURONTIN) 300 MG capsule 2019 at Unknown time  Yes Yes   Sig: Take 600 mg by mouth At Bedtime Takes 300 mg in the morning and 600 in the evening   hydrOXYzine (ATARAX) 25 MG tablet Past  "Week at Unknown time  No Yes   Sig: Take 2 tablets (50 mg) by mouth every 6 hours as needed   loratadine (CLARITIN) 10 MG tablet 12/22/2019 at Unknown time Self Yes Yes   Sig: Take 10 mg by mouth daily as needed for allergies    metoprolol succinate ER (TOPROL-XL) 100 MG 24 hr tablet 12/22/2019 at Unknown time  No Yes   Sig: Take 0.5 tablets (50 mg) by mouth daily   order for DME DME at DME  No No   Sig: Equipment being ordered: Right neoprene knee brace.  ( Fax to [facility] fax  [phone])   tiotropium (SPIRIVA HANDIHALER) 18 MCG capsule 12/22/2019 at Unknown time Self No Yes   Sig: Inhale 1 capsule (18 mcg) into the lungs daily   triamcinolone (KENALOG) 0.1 % cream Past Month at Unknown time Self Yes Yes   Sig: Apply topically 2 times daily as needed for irritation   warfarin ANTICOAGULANT (COUMADIN) 5 MG tablet 12/21/2019 at Unknown time  No Yes   Sig: Take 1 tablet (5mg) daily Or as directed by INR nurses      Facility-Administered Medications: None     Allergies   Allergies   Allergen Reactions     Lidocaine Blisters     Allergy to lidocaine ointment     Omeprazole Itching     Pantoprazole Itching     Prevacid [Lansoprazole] Itching     Lasix [Furosemide] Rash     Penicillins Rash     \"broke out from injection\" 60 yrs ago         Physical Exam   Vital Signs: Temp: 98.9  F (37.2  C) Temp src: Oral BP: 108/54 Pulse: 80 Heart Rate: 85 Resp: 16 SpO2: 93 % O2 Device: Nasal cannula Oxygen Delivery: 3 LPM  Weight: 180 lbs 0 oz    Constitutional: awake, alert, cooperative, no apparent distress.   Eyes: Lids and lashes normal, pupils equal, round and reactive to light   ENT: Normocephalic, without obvious abnormality, atraumatic, sinuses nontender on palpation   Hematologic / Lymphatic: no cervical lymphadenopathy   Respiratory: diminished at bases, otherwise clear.   Cardiovascular: RRR with no m/r/g   GI: Normal bowel sounds, soft, non-distended, non-tender.   Skin: normal skin color, texture, turgor "   Musculoskeletal: There is no redness, warmth, or swelling of the joints. Full range of motion noted.   Neurologic: Awake, alert, oriented to name, place and time. Cranial nerves II-XII are grossly intact. Motor is 5 out of 5 bilaterally. Sensory is intact.   Neuropsychiatric: normal mood and affect    Data   Data reviewed today: I reviewed all medications, new labs and imaging results over the last 24 hours. I personally reviewed the chest x-ray image(s) showing see below.    Most Recent 3 CBC's:  Recent Labs   Lab Test 12/22/19  1342 05/16/19  0641 05/14/19  0605   WBC 24.7* 9.2 10.8   HGB 11.9* 11.0* 10.8*   MCV 91 86 86    266 248     Most Recent 3 BMP's:  Recent Labs   Lab Test 12/22/19  1342 05/15/19  0700 05/13/19  1128  03/27/19  1319     --  138  --  140   POTASSIUM 4.4  --  4.3  --  4.9   CHLORIDE 111*  --  105  --  109   CO2 33*  --  28  --  27   BUN 19  --  21  --  25   CR 0.95 1.00 1.23   < > 1.06   ANIONGAP <1*  --  5  --  4   AMRITA 8.3*  --  8.6  --  8.4*   *  --  107*  --  112*    < > = values in this interval not displayed.     Recent Results (from the past 24 hour(s))   XR Chest 2 Views    Narrative    XR CHEST TWO VIEWS   12/22/2019 2:23 PM     HISTORY: Fever.    COMPARISON: 10/1/2019.      Impression    IMPRESSION: Suggestion of new patchy bibasilar opacities. Pneumonia is  a possibility. Hypoinflated lungs. Stable sternotomy. Cardiac  silhouette appears mildly larger.    VERONIKA FREY MD

## 2019-12-22 NOTE — ED NOTES
Bed: ED04  Expected date:   Expected time:   Means of arrival:   Comments:  448-87m Weakness/Low Bp

## 2019-12-22 NOTE — ED NOTES
"[facility]  ED Nurse Handoff Report    ED Chief complaint: Fever      ED Diagnosis:   Final diagnoses:   Pneumonia of both lungs due to infectious organism, unspecified part of lung   Sepsis without acute organ dysfunction, due to unspecified organism (H)       Code Status: Full Code    Allergies:   Allergies   Allergen Reactions     Lidocaine Blisters     Allergy to lidocaine ointment     Omeprazole Itching     Pantoprazole Itching     Prevacid [Lansoprazole] Itching     Lasix [Furosemide] Rash     Penicillins Rash     \"broke out from injection\" 60 yrs ago         Activity level - Baseline/Home:  Independent  Activity Level - Current:   Stand with Assist    Patient's Preferred language: english   Needed?: No    Isolation: No  Infection: Not Applicable  Bariatric?: No    Vital Signs:   Vitals:    12/22/19 1330 12/22/19 1335 12/22/19 1400 12/22/19 1500   BP: 109/74   114/50   Pulse: 85   76   Resp: 20      Temp: 100.5  F (38.1  C)      TempSrc: Oral      SpO2: (!) 87% 95% 97% 100%   Weight: 81.6 kg (180 lb)      Height: 1.778 m (5' 10\")          Cardiac Rhythm: ,        Pain level:      Is this patient confused?: No   Does this patient have a guardian?  No         If yes, is there guardianship documents in the Epic \"Code/ACP\" activity?  N/A         Guardian Notified?  N/A  [place] - Suicide Severity Rating Scale Completed?  Yes  If yes, what color did the patient score?  White    Patient Report: Initial Complaint: fever /sob  Focused Assessment: 87 year old male with history of atrial fibrillation on Coumadin, non-Hodgkin's lymphoma, COPD who presents today with a fever. The patient states this morning at 0830 he had trouble getting out of bed secondary to weakness and tremors of extremities. His wife states that his speech was slurred, but here in the ED it is improved. He states left leg swelling and a productive cough in the last week. Here in the ED he has a fever of 100.5.Has needed " 3L NC to keep sats above 92%; normally does not use oxygen.  A&O   Tests Performed: labs, xray  Abnormal Results:   Results for orders placed or performed during the hospital encounter of 12/22/19   CBC with platelets differential     Status: Abnormal   Result Value Ref Range    WBC 24.7 (H) 4.0 - 11.0 10e9/L    RBC Count 4.15 (L) 4.4 - 5.9 10e12/L    Hemoglobin 11.9 (L) 13.3 - 17.7 g/dL    Hematocrit 37.7 (L) 40.0 - 53.0 %    MCV 91 78 - 100 fl    MCH 28.7 26.5 - 33.0 pg    MCHC 31.6 31.5 - 36.5 g/dL    RDW 14.0 10.0 - 15.0 %    Platelet Count 177 150 - 450 10e9/L    Diff Method Automated Method     % Neutrophils 86.5 %    % Lymphocytes 2.7 %    % Monocytes 8.9 %    % Eosinophils 1.6 %    % Basophils 0.1 %    % Immature Granulocytes 0.2 %    Absolute Neutrophil 21.4 (H) 1.6 - 8.3 10e9/L    Absolute Lymphocytes 0.7 (L) 0.8 - 5.3 10e9/L    Absolute Monocytes 2.2 (H) 0.0 - 1.3 10e9/L    Absolute Eosinophils 0.4 0.0 - 0.7 10e9/L    Absolute Basophils 0.0 0.0 - 0.2 10e9/L    Abs Immature Granulocytes 0.1 0 - 0.4 10e9/L   Basic metabolic panel     Status: Abnormal   Result Value Ref Range    Sodium 142 133 - 144 mmol/L    Potassium 4.4 3.4 - 5.3 mmol/L    Chloride 111 (H) 94 - 109 mmol/L    Carbon Dioxide 33 (H) 20 - 32 mmol/L    Anion Gap <1 (L) 3 - 14 mmol/L    Glucose 108 (H) 70 - 99 mg/dL    Urea Nitrogen 19 7 - 30 mg/dL    Creatinine 0.95 0.66 - 1.25 mg/dL    GFR Estimate 72 >60 mL/min/[1.73_m2]    GFR Estimate If Black 83 >60 mL/min/[1.73_m2]    Calcium 8.3 (L) 8.5 - 10.1 mg/dL   Comprehensive Panel Addition     Status: Abnormal   Result Value Ref Range    Bilirubin Total 0.7 0.2 - 1.3 mg/dL    ALT 11 0 - 70 U/L    AST 11 0 - 45 U/L    Protein Total 5.9 (L) 6.8 - 8.8 g/dL    Alkaline Phosphatase 86 40 - 150 U/L    Albumin 2.8 (L) 3.4 - 5.0 g/dL   INR     Status: Abnormal   Result Value Ref Range    INR 1.90 (H) 0.86 - 1.14   ISTAT gases lactate tito POCT     Status: Abnormal   Result Value Ref Range    Ph Venous  7.29 (L) 7.32 - 7.43 pH    PCO2 Venous 59 (H) 40 - 50 mm Hg    PO2 Venous 18 (L) 25 - 47 mm Hg    Bicarbonate Venous 29 (H) 21 - 28 mmol/L    O2 Sat Venous 21 %    Lactic Acid 0.8 0.7 - 2.1 mmol/L   Influenza A/B antigen     Status: None   Result Value Ref Range    Influenza A/B Agn Specimen Nasopharyngeal     Influenza A Negative NEG^Negative    Influenza B Negative NEG^Negative       Treatments provided: bolus LR, cefepime,   Family Comments: spouse    OBS brochure/video discussed/provided to patient/family: N/A              Name of person given brochure if not patient: x              Relationship to patient: x    ED Medications:   Medications   sodium chloride (PF) 0.9% PF flush 3 mL (has no administration in time range)   sodium chloride (PF) 0.9% PF flush 3 mL (has no administration in time range)   azithromycin (ZITHROMAX) 500 mg vial to attach to  mL bag (has no administration in time range)   lactated ringers BOLUS 500 mL (0 mLs Intravenous Stopped 12/22/19 1525)   ceFEPIme (MAXIPIME) 2 g vial to attach to  ml bag for ADULTS or 50 ml bag for PEDS (2 g Intravenous New Bag 12/22/19 1518)       Drips infusing?:  No    For the majority of the shift this patient was Green.   Interventions performed were x.    Severe Sepsis OR Septic Shock Diagnosis Present: No    To be done/followed up on inpatient unit:  x    ED NURSE PHONE NUMBER: x

## 2019-12-22 NOTE — PHARMACY-ANTICOAGULATION SERVICE
Clinical Pharmacy - Warfarin Dosing Consult     Pharmacy has been consulted to manage this patient s warfarin therapy.  Indication: Atrial Fibrillation  Therapy Goal: INR 2-3  Warfarin Prior to Admission: Yes  Warfarin PTA Regimen: 5mg daily  Significant drug interactions: Azithromycin    INR   Date Value Ref Range Status   12/22/2019 1.90 (H) 0.86 - 1.14 Final     INR Protime   Date Value Ref Range Status   12/10/2019 2.5 (A) 0.86 - 1.14 Final       Recommend warfarin 5 mg today.  Pharmacy will monitor Hermilo MADRID Leakevin daily and order warfarin doses to achieve specified goal.      Please contact pharmacy as soon as possible if the warfarin needs to be held for a procedure or if the warfarin goals change.

## 2019-12-22 NOTE — ED TRIAGE NOTES
Too weak to get out of bed this am.  Wheezing.  Hypoxic; 80% RA for EMS, improved with O2 mask.  Family thought speech somewhat slurred.  Febrile on arrival

## 2019-12-22 NOTE — PROGRESS NOTES
RECEIVING UNIT ED HANDOFF REVIEW    ED Nurse Handoff Report was reviewed by: Tiana Quintana RN on December 22, 2019 at 4:14 PM

## 2019-12-23 ENCOUNTER — APPOINTMENT (OUTPATIENT)
Dept: PHYSICAL THERAPY | Facility: CLINIC | Age: 84
DRG: 193 | End: 2019-12-23
Attending: STUDENT IN AN ORGANIZED HEALTH CARE EDUCATION/TRAINING PROGRAM
Payer: COMMERCIAL

## 2019-12-23 LAB
ANION GAP SERPL CALCULATED.3IONS-SCNC: 3 MMOL/L (ref 3–14)
BUN SERPL-MCNC: 15 MG/DL (ref 7–30)
CALCIUM SERPL-MCNC: 8 MG/DL (ref 8.5–10.1)
CHLORIDE SERPL-SCNC: 108 MMOL/L (ref 94–109)
CO2 SERPL-SCNC: 28 MMOL/L (ref 20–32)
CREAT SERPL-MCNC: 0.93 MG/DL (ref 0.66–1.25)
ERYTHROCYTE [DISTWIDTH] IN BLOOD BY AUTOMATED COUNT: 14.1 % (ref 10–15)
GFR SERPL CREATININE-BSD FRML MDRD: 73 ML/MIN/{1.73_M2}
GLUCOSE SERPL-MCNC: 95 MG/DL (ref 70–99)
HCT VFR BLD AUTO: 35.2 % (ref 40–53)
HGB BLD-MCNC: 10.8 G/DL (ref 13.3–17.7)
INR PPP: 2.09 (ref 0.86–1.14)
MCH RBC QN AUTO: 28.3 PG (ref 26.5–33)
MCHC RBC AUTO-ENTMCNC: 30.7 G/DL (ref 31.5–36.5)
MCV RBC AUTO: 92 FL (ref 78–100)
PLATELET # BLD AUTO: 169 10E9/L (ref 150–450)
POTASSIUM SERPL-SCNC: 4.2 MMOL/L (ref 3.4–5.3)
RBC # BLD AUTO: 3.82 10E12/L (ref 4.4–5.9)
SODIUM SERPL-SCNC: 139 MMOL/L (ref 133–144)
WBC # BLD AUTO: 15.5 10E9/L (ref 4–11)

## 2019-12-23 PROCEDURE — 85027 COMPLETE CBC AUTOMATED: CPT | Performed by: STUDENT IN AN ORGANIZED HEALTH CARE EDUCATION/TRAINING PROGRAM

## 2019-12-23 PROCEDURE — 25800030 ZZH RX IP 258 OP 636: Performed by: STUDENT IN AN ORGANIZED HEALTH CARE EDUCATION/TRAINING PROGRAM

## 2019-12-23 PROCEDURE — 12000000 ZZH R&B MED SURG/OB

## 2019-12-23 PROCEDURE — 25000128 H RX IP 250 OP 636: Performed by: STUDENT IN AN ORGANIZED HEALTH CARE EDUCATION/TRAINING PROGRAM

## 2019-12-23 PROCEDURE — 36415 COLL VENOUS BLD VENIPUNCTURE: CPT | Performed by: STUDENT IN AN ORGANIZED HEALTH CARE EDUCATION/TRAINING PROGRAM

## 2019-12-23 PROCEDURE — 97161 PT EVAL LOW COMPLEX 20 MIN: CPT | Mod: GP | Performed by: PHYSICAL THERAPIST

## 2019-12-23 PROCEDURE — 25000132 ZZH RX MED GY IP 250 OP 250 PS 637: Performed by: STUDENT IN AN ORGANIZED HEALTH CARE EDUCATION/TRAINING PROGRAM

## 2019-12-23 PROCEDURE — 85610 PROTHROMBIN TIME: CPT | Performed by: STUDENT IN AN ORGANIZED HEALTH CARE EDUCATION/TRAINING PROGRAM

## 2019-12-23 PROCEDURE — 99232 SBSQ HOSP IP/OBS MODERATE 35: CPT | Performed by: STUDENT IN AN ORGANIZED HEALTH CARE EDUCATION/TRAINING PROGRAM

## 2019-12-23 PROCEDURE — 97530 THERAPEUTIC ACTIVITIES: CPT | Mod: GP | Performed by: PHYSICAL THERAPIST

## 2019-12-23 PROCEDURE — 80048 BASIC METABOLIC PNL TOTAL CA: CPT | Performed by: STUDENT IN AN ORGANIZED HEALTH CARE EDUCATION/TRAINING PROGRAM

## 2019-12-23 PROCEDURE — 97116 GAIT TRAINING THERAPY: CPT | Mod: GP | Performed by: PHYSICAL THERAPIST

## 2019-12-23 RX ORDER — WARFARIN SODIUM 5 MG/1
5 TABLET ORAL
Status: COMPLETED | OUTPATIENT
Start: 2019-12-23 | End: 2019-12-23

## 2019-12-23 RX ADMIN — AZITHROMYCIN MONOHYDRATE 500 MG: 500 INJECTION, POWDER, LYOPHILIZED, FOR SOLUTION INTRAVENOUS at 16:31

## 2019-12-23 RX ADMIN — METOPROLOL SUCCINATE 50 MG: 50 TABLET, EXTENDED RELEASE ORAL at 08:38

## 2019-12-23 RX ADMIN — GABAPENTIN 300 MG: 300 CAPSULE ORAL at 08:38

## 2019-12-23 RX ADMIN — WARFARIN SODIUM 5 MG: 5 TABLET ORAL at 17:52

## 2019-12-23 RX ADMIN — CEFTRIAXONE SODIUM 1 G: 1 INJECTION, POWDER, FOR SOLUTION INTRAMUSCULAR; INTRAVENOUS at 15:57

## 2019-12-23 RX ADMIN — GABAPENTIN 600 MG: 600 TABLET, FILM COATED ORAL at 20:13

## 2019-12-23 RX ADMIN — SODIUM CHLORIDE, POTASSIUM CHLORIDE, SODIUM LACTATE AND CALCIUM CHLORIDE: 600; 310; 30; 20 INJECTION, SOLUTION INTRAVENOUS at 03:39

## 2019-12-23 RX ADMIN — ATORVASTATIN CALCIUM 10 MG: 10 TABLET, FILM COATED ORAL at 08:38

## 2019-12-23 RX ADMIN — UMECLIDINIUM 1 PUFF: 62.5 AEROSOL, POWDER ORAL at 08:38

## 2019-12-23 ASSESSMENT — ACTIVITIES OF DAILY LIVING (ADL)
ADLS_ACUITY_SCORE: 17
ADLS_ACUITY_SCORE: 18

## 2019-12-23 ASSESSMENT — MIFFLIN-ST. JEOR
SCORE: 1038.68
SCORE: 1489.56

## 2019-12-23 NOTE — PROGRESS NOTES
12/23/19 1532   Quick Adds   Type of Visit Initial PT Evaluation   Living Environment   Lives With spouse   Living Arrangements house   Home Accessibility stairs to enter home;stairs within home   Number of Stairs, Main Entrance 1   Stair Railings, Main Entrance   (Grab bar)   Number of Stairs, Within Home, Primary   (10)   Stair Railings, Within Home, Primary railing on left side (ascending)   Transportation Anticipated car, drives self   Self-Care   Usual Activity Tolerance good   Current Activity Tolerance moderate   Regular Exercise No   Equipment Currently Used at Home cane, straight   Activity/Exercise/Self-Care Comment Pt owns FWW.   Functional Level Prior   Ambulation 1-->assistive equipment   Transferring 1-->assistive equipment   Fall history within last six months yes   Number of times patient has fallen within last six months 1   General Information   Onset of Illness/Injury or Date of Surgery - Date 12/22/19   Referring Physician Quintin Fajardo MD   Patient/Family Goals Statement Return home.   Pertinent History of Current Problem (include personal factors and/or comorbidities that impact the POC) 86 y/o male admitted with fever, found to have CAP. PMH including GERD, non Hodgkin's lymphoma, paroxysmal atrial fibrillation, hyperlipidemia.    Precautions/Limitations fall precautions   General Observations Pt in supine upon arrival of therapist on RA.    General Info Comments Up with assist.    Cognitive Status Examination   Orientation person;place   Level of Consciousness alert   Follows Commands and Answers Questions 100% of the time   Personal Safety and Judgment intact   Pain Assessment   Patient Currently in Pain No   Integumentary/Edema   Integumentary/Edema Comments No deficits noted.    Posture    Posture Comments Noted forward head and shoulder posture upon sitting EOB and standing at FWW.    Range of Motion (ROM)   ROM Comment B LEs WFL.    Strength   Strength Comments Not formally assessed,  "pt demonstrated at least 3/5 grossly in B LEs with functional mobility.    Bed Mobility   Bed Mobility Comments Supine <> sit with SBA.    Transfer Skills   Transfer Comments Sit <> stand with FWW and CGA.    Gait   Gait Comments Pt amb 5' with FWW and CGA.    Balance   Balance Comments Noted good seated and standing balance at FWW.    Sensory Examination   Sensory Perception Comments Pt denies numbness/tingling in B LEs and UEs.    General Therapy Interventions   Planned Therapy Interventions bed mobility training;gait training;ROM;strengthening;transfer training   Clinical Impression   Criteria for Skilled Therapeutic Intervention yes, treatment indicated   PT Diagnosis Difficulty with gait.   Influenced by the following impairments Generalized weakness, SOB, Decreased activity tolerance   Functional limitations due to impairments Limited functional mobility requiring AD and assist.    Clinical Presentation Stable/Uncomplicated   Clinical Presentation Rationale Based on PMH, current presentation, and social support.    Clinical Decision Making (Complexity) Low complexity   Therapy Frequency Daily   Predicted Duration of Therapy Intervention (days/wks) 3 days   Anticipated Discharge Disposition Home with Assist   Risk & Benefits of therapy have been explained Yes   Patient, Family & other staff in agreement with plan of care Yes   [facility] [employer] TM \"6 Clicks\"   2016, Trustees of [facility], under license to [employer].  All rights reserved.   6 Clicks Short Forms Basic Mobility Inpatient Short Form   [facility]  \"6 Clicks\" V.2 Basic Mobility Inpatient Short Form   1. Turning from your back to your side while in a flat bed without using bedrails? 4 - None   2. Moving from lying on your back to sitting on the side of a flat bed without using bedrails? 4 - None   3. Moving to and from a bed to a chair (including a wheelchair)? 3 - A Little   4. Standing up from a chair using your arms " (e.g., wheelchair, or bedside chair)? 3 - A Little   5. To walk in hospital room? 3 - A Little   6. Climbing 3-5 steps with a railing? 3 - A Little   Basic Mobility Raw Score (Score out of 24.Lower scores equate to lower levels of function) 20   Total Evaluation Time   Total Evaluation Time (Minutes) 5

## 2019-12-23 NOTE — PLAN OF CARE
Discharge Planner PT   Patient plan for discharge: Return home.  Current status: Orders received, evaluation completed, and treatment initiated. Patient is a 86 y/o male admitted with a fever, found to have CAP. Pt lives with spouse, 1 step to enter/exit and a flight of stairs to access bedroom. Pt is independent at baseline with use of SEC. Pt owns FWW. SpO2 on RA at rest >90%. Pt performed supine <> sit with SBA. Sit <> stand with FWW and CGA. Noted SpO2 on RA upon sitting EOB <90% and pt unable to maintain >90% with cues for PLB. SpO2 improved to >90% with 2 L/min. Pt ambulated 175' with FWW and CGA on 2 L/min, SpO2: 88-90%. Pt returned to supine at end of session.  Barriers to return to prior living situation: Decreased activity tolerance, SOB  Recommendations for discharge: Return home with assist from spouse for household tasks until SOB improves  Rationale for recommendations: Anticipate pt will continue to progress towards independence in order to safely return home.       Entered by: Amaya Pulido 12/23/2019 4:15 PM

## 2019-12-23 NOTE — PLAN OF CARE
Arrived to floor around 1645. A/Ox4. VSS on 3L O2, satting low 90s. Denies SOB but slightly dyspneic with exertion. Regular diet, good appetite. L PIV infusing LR 100ml/hr, intermittent abx. Last BM 12/21. Noted some hesitancy with urination, but voiding adequately. Has not ambulated yet, reports weakness and has not ambulated since going to bed last night. Anticipating Ax1-2 gb/walker. Continue to monitor respiratory status.

## 2019-12-23 NOTE — PROGRESS NOTES
[facility]    Medicine Progress Note - Hospitalist Service       Date of Admission:  12/22/2019  Date of Service: 12/23/2019    Assessment & Plan   Hermilo Velasquez is a 87 year old male admitted on 12/22/2019. He presents with fever.       CAP    Acute Hypoxic Respiratory Failure    Chronic obstructive pulmonary disease, unspecified COPD type (H)    Assessment: presents with fever 100.5F in ED, along with generalized weakness. CXR   shows new patchy bibasilar opacities, likely new pneumonia. Hemodynamics stable. No wheezing on exam. Improving, wbc normalized.     Plan:   - IV Ceftriaxone and Azithromycin  - Supplemental O2 as needed  - Follow vitals/temp  - Follow CBC/fever curve  - Broaden antibiotics as needed  - Duonebs QID and as needed      GERD (gastroesophageal reflux disease)    Assessment/Plan: PPI as needed      Non Hodgkin's lymphoma (H): 2013    Assessment/Plan: stable, follow as outpatient      Paroxysmal atrial fibrillation (H)    Other chronic pulmonary embolism without acute cor pulmonale (H)    Assessment/Plan: continue warfarin, pharmacy to dose. Continue PTA BB      Essential hypertension with goal blood pressure less than 140/90    Assessment: PTA patient is on BB    Plan:   - Continue PTA BB in AM      Hyperlipidemia LDL goal <130    Assessment/Plan: continue PTA Lipitor        Diet: Combination Diet Regular Diet Adult    DVT Prophylaxis: Low Risk/Ambulatory with no VTE prophylaxis indicated  Suazo Catheter: not present  Code Status: Full Code      Disposition Plan   Expected discharge: Tomorrow, recommended to prior living arrangement once antibiotic plan established.  Entered: Quintin Fajardo MD 12/23/2019, 1:34 PM       The patient's care was discussed with the Bedside Nurse and Patient.    Quintin Fajardo MD  Hospitalist Service  [facility]    ______________________________________________________________________    Interval History     Feels improved, no fevers  or chills,  Still needed supplemental oxygen  No nausea/vomiting or abdominal pain  No new complaints today.     Data reviewed today: I reviewed all medications, new labs and imaging results over the last 24 hours. I personally reviewed no images or EKG's today.    Physical Exam   Vital Signs: Temp: 98  F (36.7  C) Temp src: Oral BP: 130/51 Pulse: 79 Heart Rate: 63 Resp: 18 SpO2: 90 % O2 Device: Nasal cannula Oxygen Delivery: 2 LPM  Weight: 178 lbs 3.2 oz     Constitutional: awake, alert, cooperative, no apparent distress.   Respiratory: diminished at bases, otherwise clear.   Cardiovascular: RRR with no m/r/g   GI: Normal bowel sounds, soft, non-distended, non-tender.   Neurologic: Awake, alert, oriented to name, place and time. Moving all extremities.   Neuropsychiatric: normal mood and affect  Data   Recent Labs   Lab 12/23/19  0717 12/22/19  1342   WBC 15.5* 24.7*   HGB 10.8* 11.9*   MCV 92 91    177   INR 2.09* 1.90*    142   POTASSIUM 4.2 4.4   CHLORIDE 108 111*   CO2 28 33*   BUN 15 19   CR 0.93 0.95   ANIONGAP 3 <1*   AMRITA 8.0* 8.3*   GLC 95 108*   ALBUMIN  --  2.8*   PROTTOTAL  --  5.9*   BILITOTAL  --  0.7   ALKPHOS  --  86   ALT  --  11   AST  --  11     Recent Results (from the past 24 hour(s))   XR Chest 2 Views    Narrative    XR CHEST TWO VIEWS   12/22/2019 2:23 PM     HISTORY: Fever.    COMPARISON: 10/1/2019.      Impression    IMPRESSION: Suggestion of new patchy bibasilar opacities. Pneumonia is  a possibility. Hypoinflated lungs. Stable sternotomy. Cardiac  silhouette appears mildly larger.    VERONIKA FREY MD     Medications     - MEDICATION INSTRUCTIONS -       Warfarin Therapy Reminder         atorvastatin  10 mg Oral Daily     azithromycin  500 mg Intravenous Q24H     cefTRIAXone  1 g Intravenous Q24H     gabapentin  300 mg Oral QAM     gabapentin  600 mg Oral QPM     metoprolol succinate ER  50 mg Oral Daily     sodium chloride (PF)  3 mL Intracatheter Q8H     umeclidinium  1 puff  Inhalation Daily     warfarin ANTICOAGULANT  5 mg Oral ONCE at 18:00

## 2019-12-23 NOTE — PLAN OF CARE
A&Ox4. VSS - weaned O2 to 1L (low 90s). Denies pain. LS diminished. Encouraging use of IS - up to 500. IVF infusing, intermittent iv abx. Productive cough at times. Regular diet. Has not ambulated this shift- pt reports that he used to use a walker to ambulate. Voiding in urinal. Baseline neuropathy. Continue to monitor.

## 2019-12-24 LAB
ANION GAP SERPL CALCULATED.3IONS-SCNC: 5 MMOL/L (ref 3–14)
BUN SERPL-MCNC: 14 MG/DL (ref 7–30)
CALCIUM SERPL-MCNC: 8.4 MG/DL (ref 8.5–10.1)
CHLORIDE SERPL-SCNC: 104 MMOL/L (ref 94–109)
CO2 SERPL-SCNC: 28 MMOL/L (ref 20–32)
CREAT SERPL-MCNC: 0.84 MG/DL (ref 0.66–1.25)
ERYTHROCYTE [DISTWIDTH] IN BLOOD BY AUTOMATED COUNT: 13.6 % (ref 10–15)
GFR SERPL CREATININE-BSD FRML MDRD: 78 ML/MIN/{1.73_M2}
GLUCOSE SERPL-MCNC: 95 MG/DL (ref 70–99)
HCT VFR BLD AUTO: 35.4 % (ref 40–53)
HGB BLD-MCNC: 11 G/DL (ref 13.3–17.7)
INR PPP: 1.8 (ref 0.86–1.14)
MCH RBC QN AUTO: 28.3 PG (ref 26.5–33)
MCHC RBC AUTO-ENTMCNC: 31.1 G/DL (ref 31.5–36.5)
MCV RBC AUTO: 91 FL (ref 78–100)
NT-PROBNP SERPL-MCNC: 3392 PG/ML (ref 0–1800)
PLATELET # BLD AUTO: 184 10E9/L (ref 150–450)
POTASSIUM SERPL-SCNC: 4.1 MMOL/L (ref 3.4–5.3)
RBC # BLD AUTO: 3.89 10E12/L (ref 4.4–5.9)
SODIUM SERPL-SCNC: 137 MMOL/L (ref 133–144)
WBC # BLD AUTO: 13.4 10E9/L (ref 4–11)

## 2019-12-24 PROCEDURE — 25000132 ZZH RX MED GY IP 250 OP 250 PS 637: Performed by: STUDENT IN AN ORGANIZED HEALTH CARE EDUCATION/TRAINING PROGRAM

## 2019-12-24 PROCEDURE — 36415 COLL VENOUS BLD VENIPUNCTURE: CPT | Performed by: STUDENT IN AN ORGANIZED HEALTH CARE EDUCATION/TRAINING PROGRAM

## 2019-12-24 PROCEDURE — 12000000 ZZH R&B MED SURG/OB

## 2019-12-24 PROCEDURE — 85610 PROTHROMBIN TIME: CPT | Performed by: STUDENT IN AN ORGANIZED HEALTH CARE EDUCATION/TRAINING PROGRAM

## 2019-12-24 PROCEDURE — 99232 SBSQ HOSP IP/OBS MODERATE 35: CPT | Performed by: STUDENT IN AN ORGANIZED HEALTH CARE EDUCATION/TRAINING PROGRAM

## 2019-12-24 PROCEDURE — 85027 COMPLETE CBC AUTOMATED: CPT | Performed by: STUDENT IN AN ORGANIZED HEALTH CARE EDUCATION/TRAINING PROGRAM

## 2019-12-24 PROCEDURE — 25000128 H RX IP 250 OP 636: Performed by: STUDENT IN AN ORGANIZED HEALTH CARE EDUCATION/TRAINING PROGRAM

## 2019-12-24 PROCEDURE — 80048 BASIC METABOLIC PNL TOTAL CA: CPT | Performed by: STUDENT IN AN ORGANIZED HEALTH CARE EDUCATION/TRAINING PROGRAM

## 2019-12-24 PROCEDURE — 83880 ASSAY OF NATRIURETIC PEPTIDE: CPT | Performed by: STUDENT IN AN ORGANIZED HEALTH CARE EDUCATION/TRAINING PROGRAM

## 2019-12-24 RX ORDER — BUMETANIDE 0.25 MG/ML
1 INJECTION INTRAMUSCULAR; INTRAVENOUS ONCE
Status: COMPLETED | OUTPATIENT
Start: 2019-12-24 | End: 2019-12-24

## 2019-12-24 RX ADMIN — UMECLIDINIUM 1 PUFF: 62.5 AEROSOL, POWDER ORAL at 07:51

## 2019-12-24 RX ADMIN — ACETAMINOPHEN 650 MG: 325 TABLET, FILM COATED ORAL at 17:12

## 2019-12-24 RX ADMIN — AZITHROMYCIN MONOHYDRATE 500 MG: 500 INJECTION, POWDER, LYOPHILIZED, FOR SOLUTION INTRAVENOUS at 18:31

## 2019-12-24 RX ADMIN — ACETAMINOPHEN 650 MG: 325 TABLET, FILM COATED ORAL at 11:06

## 2019-12-24 RX ADMIN — GABAPENTIN 300 MG: 300 CAPSULE ORAL at 07:51

## 2019-12-24 RX ADMIN — CEFTRIAXONE SODIUM 1 G: 1 INJECTION, POWDER, FOR SOLUTION INTRAMUSCULAR; INTRAVENOUS at 16:28

## 2019-12-24 RX ADMIN — METOPROLOL SUCCINATE 50 MG: 50 TABLET, EXTENDED RELEASE ORAL at 07:51

## 2019-12-24 RX ADMIN — GABAPENTIN 600 MG: 600 TABLET, FILM COATED ORAL at 19:43

## 2019-12-24 RX ADMIN — ATORVASTATIN CALCIUM 10 MG: 10 TABLET, FILM COATED ORAL at 07:51

## 2019-12-24 RX ADMIN — BUMETANIDE 1 MG: 0.25 INJECTION INTRAMUSCULAR; INTRAVENOUS at 12:21

## 2019-12-24 RX ADMIN — WARFARIN SODIUM 7.5 MG: 2.5 TABLET ORAL at 17:12

## 2019-12-24 ASSESSMENT — ACTIVITIES OF DAILY LIVING (ADL)
ADLS_ACUITY_SCORE: 18
ADLS_ACUITY_SCORE: 18
ADLS_ACUITY_SCORE: 19
ADLS_ACUITY_SCORE: 19
ADLS_ACUITY_SCORE: 18
ADLS_ACUITY_SCORE: 18

## 2019-12-24 ASSESSMENT — MIFFLIN-ST. JEOR: SCORE: 1478.22

## 2019-12-24 NOTE — PLAN OF CARE
A&O. VSS on 2.5L O2, LAROSE. LS diminished. IS encouraged. Pt reports right knee pain, decreased with tylenol and knee brace from home. Regular diet, pt does not like the hospital food, wife brought lunch. Up with A1 GB and walker. Voiding in urinal. IV SL. Discharge postponed until tomorrow for diuresis. IV bumex today.

## 2019-12-24 NOTE — PLAN OF CARE
A&Ox4. VSS on 2L O2, increased d/t desating while sleeping. Denies SOB but is LAROSE. Denied pain. Regular diet. Assist of 1+gb+w. Voiding in urinal, adequate output. PIV SL+intermittent antibiotics. Possible discharge back home today, possibly with O2.

## 2019-12-24 NOTE — PROGRESS NOTES
[facility]    Medicine Progress Note - Hospitalist Service       Date of Admission:  12/22/2019  Date of Service: 12/24/2019    Assessment & Plan   Hermilo Velasquez is a 87 year old male admitted on 12/22/2019. He presents with fever.       CAP    Acute Hypoxic Respiratory Failure    Chronic obstructive pulmonary disease, unspecified COPD type (H)    Assessment: presents with fever 100.5F in ED, along with generalized weakness. CXR   shows new patchy bibasilar opacities, likely new pneumonia. Hemodynamics stable. No wheezing on exam. Improving, wbc trending down.     Plan:   - IV Ceftriaxone and Azithromycin  - Supplemental O2 as needed  - Follow vitals/temp  - Follow CBC/fever curve  - Broaden antibiotics as needed  - Duonebs QID and as needed    CHF  Assessment: pro BNP elevated with trace crackles at bases. Chest pain free. Has allergy to Lasix.   Plan:  - Bumex 1 mg today  - Follow daily weights  - ECHO as outpatient      GERD (gastroesophageal reflux disease)    Assessment/Plan: PPI as needed      Non Hodgkin's lymphoma (H): 2013    Assessment/Plan: stable, follow as outpatient      Paroxysmal atrial fibrillation (H)    Other chronic pulmonary embolism without acute cor pulmonale (H)    Assessment/Plan: continue warfarin, pharmacy to dose. Continue PTA BB      Essential hypertension with goal blood pressure less than 140/90    Assessment: PTA patient is on BB    Plan:   - Continue PTA BB in AM      Hyperlipidemia LDL goal <130    Assessment/Plan: continue PTA Lipitor        Diet: Combination Diet Regular Diet Adult  Diet    DVT Prophylaxis: Low Risk/Ambulatory with no VTE prophylaxis indicated  Suazo Catheter: not present  Code Status: Full Code      Disposition Plan   Expected discharge: Tomorrow, recommended to prior living arrangement once antibiotic plan established.  Entered: Quintin Fajardo MD 12/24/2019, 10:23 AM       The patient's care was discussed with the Bedside Nurse and  Patient.    Quintin Fajardo MD  Hospitalist Service  [facility]    ______________________________________________________________________    Interval History     Feels unchanged today, no fevers or chills,  Still needing supplemental oxygen  No nausea/vomiting or abdominal pain  No new complaints today.     Data reviewed today: I reviewed all medications, new labs and imaging results over the last 24 hours. I personally reviewed no images or EKG's today.    Physical Exam   Vital Signs: Temp: 97.5  F (36.4  C) Temp src: Oral BP: 110/57 Pulse: 94 Heart Rate: 77 Resp: 18 SpO2: 92 % O2 Device: Nasal cannula Oxygen Delivery: 2.5 LPM  Weight: 175 lbs 11.2 oz     Constitutional: awake, alert, cooperative, no apparent distress.   Respiratory: diminished at bases with some trace crackles, otherwise clear.   Cardiovascular: RRR with no m/r/g   GI: Normal bowel sounds, soft, non-distended, non-tender.   Neurologic: Awake, alert, oriented to name, place and time. Moving all extremities.   Neuropsychiatric: normal mood and affect  Data   Recent Labs   Lab 12/24/19  0721 12/23/19  0717 12/22/19  1342   WBC 13.4* 15.5* 24.7*   HGB 11.0* 10.8* 11.9*   MCV 91 92 91    169 177   INR 1.80* 2.09* 1.90*    139 142   POTASSIUM 4.1 4.2 4.4   CHLORIDE 104 108 111*   CO2 28 28 33*   BUN 14 15 19   CR 0.84 0.93 0.95   ANIONGAP 5 3 <1*   AMRITA 8.4* 8.0* 8.3*   GLC 95 95 108*   ALBUMIN  --   --  2.8*   PROTTOTAL  --   --  5.9*   BILITOTAL  --   --  0.7   ALKPHOS  --   --  86   ALT  --   --  11   AST  --   --  11     No results found for this or any previous visit (from the past 24 hour(s)).  Medications     - MEDICATION INSTRUCTIONS -       Warfarin Therapy Reminder         atorvastatin  10 mg Oral Daily     azithromycin  500 mg Intravenous Q24H     bumetanide  1 mg Intravenous Once     cefTRIAXone  1 g Intravenous Q24H     gabapentin  300 mg Oral QAM     gabapentin  600 mg Oral QPM     metoprolol succinate ER  50 mg  Oral Daily     sodium chloride (PF)  3 mL Intracatheter Q8H     umeclidinium  1 puff Inhalation Daily

## 2019-12-24 NOTE — CONSULTS
"Care Coordination:    Care Transition Initial Assessment - RN        Met with: Patient.  DATA   Active Problems:    GERD (gastroesophageal reflux disease)    Non Hodgkin's lymphoma (H): 2013    Paroxysmal atrial fibrillation (H)    Essential hypertension with goal blood pressure less than 140/90    Other chronic pulmonary embolism without acute cor pulmonale (H)    Hyperlipidemia LDL goal <130    Chronic obstructive pulmonary disease, unspecified COPD type (H)    CAP (community acquired pneumonia)       Cognitive Status: awake, alert and oriented.  [facility] Name: [facility]  Primary Care MD Name: Edna  Contact information and PCP information verified: Yes  Lives With: spouse   Living Arrangements: house                 Insurance concerns: No Insurance issues identified  ASSESSMENT  Patient currently receives the following services:  Pt lives with wife. Indep.  Uses inhalers, reports he has saturation machine.  Pt reports he was on oxygen \"many years ago\"  Pt drives, Indep in home, sees Dr. Bishop        Identified issues/concerns regarding health management: Pt admitted with pneumonia, fever.  Pt on IV abx and o2.  Nursing attempting to wean.  PT recommends home with family.  Met with patient and explained role. Pt realizes he may need oxygen at home.  Wants to go with [place].  MD now states that he is going to diuresis patient today and hoping patient will not need oxygen at discharge. Plan for discharge 12/25 once o2 needs known and antibiotic plan established.  Patient does not feel he needs help at home.     PLAN  Financial costs for the patient include TBD .  Patient given options and choices for discharge yes .  Patient/family is agreeable to the plan?  Yes:   Patient anticipates discharging to home .        Patient anticipates needs for home equipment: No  Transportation/person available to transport on day of discharge  is wife and have they been notified/set up call  Plan/Disposition: Home "   Appointments: Dec 30, 2019  3:30 PM CST  Office Visit with Karson Bishop MD  [facility] ([facility])    Added to AVS  Care  (CTS) will continue to follow as needed.      Paz Ceja RN BSN  Inpatient Care Coordination  [facility]  [phone]

## 2019-12-24 NOTE — PLAN OF CARE
PT: Patient declined participating in PT session. Patient reports he has increased knee pain and wants to wait until his wife brings his knee brace. Encouraged patient to mobilize with nursing this PM once brace arrives.

## 2019-12-24 NOTE — PROVIDER NOTIFICATION
Provider notification: Positive blood culture from 12/22 right arm, gram positive cocci in clusters

## 2019-12-25 VITALS
SYSTOLIC BLOOD PRESSURE: 116 MMHG | BODY MASS INDEX: 24.51 KG/M2 | DIASTOLIC BLOOD PRESSURE: 61 MMHG | TEMPERATURE: 98.1 F | RESPIRATION RATE: 18 BRPM | OXYGEN SATURATION: 90 % | WEIGHT: 171.2 LBS | HEIGHT: 70 IN | HEART RATE: 72 BPM

## 2019-12-25 LAB
ANION GAP SERPL CALCULATED.3IONS-SCNC: 1 MMOL/L (ref 3–14)
BUN SERPL-MCNC: 14 MG/DL (ref 7–30)
CALCIUM SERPL-MCNC: 8.7 MG/DL (ref 8.5–10.1)
CHLORIDE SERPL-SCNC: 104 MMOL/L (ref 94–109)
CO2 SERPL-SCNC: 33 MMOL/L (ref 20–32)
CREAT SERPL-MCNC: 0.82 MG/DL (ref 0.66–1.25)
ERYTHROCYTE [DISTWIDTH] IN BLOOD BY AUTOMATED COUNT: 13.3 % (ref 10–15)
GFR SERPL CREATININE-BSD FRML MDRD: 79 ML/MIN/{1.73_M2}
GLUCOSE SERPL-MCNC: 112 MG/DL (ref 70–99)
HCT VFR BLD AUTO: 34.9 % (ref 40–53)
HGB BLD-MCNC: 11.1 G/DL (ref 13.3–17.7)
INR PPP: 2.24 (ref 0.86–1.14)
MCH RBC QN AUTO: 28.7 PG (ref 26.5–33)
MCHC RBC AUTO-ENTMCNC: 31.8 G/DL (ref 31.5–36.5)
MCV RBC AUTO: 90 FL (ref 78–100)
PLATELET # BLD AUTO: 174 10E9/L (ref 150–450)
POTASSIUM SERPL-SCNC: 4.3 MMOL/L (ref 3.4–5.3)
RBC # BLD AUTO: 3.87 10E12/L (ref 4.4–5.9)
SODIUM SERPL-SCNC: 138 MMOL/L (ref 133–144)
WBC # BLD AUTO: 11.1 10E9/L (ref 4–11)

## 2019-12-25 PROCEDURE — 25000128 H RX IP 250 OP 636: Performed by: STUDENT IN AN ORGANIZED HEALTH CARE EDUCATION/TRAINING PROGRAM

## 2019-12-25 PROCEDURE — 85027 COMPLETE CBC AUTOMATED: CPT | Performed by: STUDENT IN AN ORGANIZED HEALTH CARE EDUCATION/TRAINING PROGRAM

## 2019-12-25 PROCEDURE — 25000132 ZZH RX MED GY IP 250 OP 250 PS 637: Performed by: STUDENT IN AN ORGANIZED HEALTH CARE EDUCATION/TRAINING PROGRAM

## 2019-12-25 PROCEDURE — 99239 HOSP IP/OBS DSCHRG MGMT >30: CPT | Performed by: STUDENT IN AN ORGANIZED HEALTH CARE EDUCATION/TRAINING PROGRAM

## 2019-12-25 PROCEDURE — 80048 BASIC METABOLIC PNL TOTAL CA: CPT | Performed by: STUDENT IN AN ORGANIZED HEALTH CARE EDUCATION/TRAINING PROGRAM

## 2019-12-25 PROCEDURE — 36415 COLL VENOUS BLD VENIPUNCTURE: CPT | Performed by: STUDENT IN AN ORGANIZED HEALTH CARE EDUCATION/TRAINING PROGRAM

## 2019-12-25 PROCEDURE — 85610 PROTHROMBIN TIME: CPT | Performed by: STUDENT IN AN ORGANIZED HEALTH CARE EDUCATION/TRAINING PROGRAM

## 2019-12-25 RX ORDER — AZITHROMYCIN 250 MG/1
250 TABLET, FILM COATED ORAL DAILY
Qty: 4 TABLET | Refills: 0 | Status: SHIPPED | OUTPATIENT
Start: 2019-12-25 | End: 2020-01-13

## 2019-12-25 RX ORDER — WARFARIN SODIUM 5 MG/1
5 TABLET ORAL
Status: DISCONTINUED | OUTPATIENT
Start: 2019-12-25 | End: 2019-12-25 | Stop reason: HOSPADM

## 2019-12-25 RX ORDER — BUMETANIDE 0.25 MG/ML
1 INJECTION INTRAMUSCULAR; INTRAVENOUS ONCE
Status: COMPLETED | OUTPATIENT
Start: 2019-12-25 | End: 2019-12-25

## 2019-12-25 RX ORDER — CEFUROXIME AXETIL 500 MG/1
500 TABLET ORAL 2 TIMES DAILY
Qty: 14 TABLET | Refills: 0 | Status: SHIPPED | OUTPATIENT
Start: 2019-12-25 | End: 2020-01-13

## 2019-12-25 RX ADMIN — ATORVASTATIN CALCIUM 10 MG: 10 TABLET, FILM COATED ORAL at 09:34

## 2019-12-25 RX ADMIN — BUMETANIDE 1 MG: 0.25 INJECTION INTRAMUSCULAR; INTRAVENOUS at 12:52

## 2019-12-25 RX ADMIN — GABAPENTIN 300 MG: 300 CAPSULE ORAL at 09:34

## 2019-12-25 RX ADMIN — METOPROLOL SUCCINATE 50 MG: 50 TABLET, EXTENDED RELEASE ORAL at 09:34

## 2019-12-25 RX ADMIN — UMECLIDINIUM 1 PUFF: 62.5 AEROSOL, POWDER ORAL at 09:40

## 2019-12-25 ASSESSMENT — MIFFLIN-ST. JEOR: SCORE: 1457.81

## 2019-12-25 ASSESSMENT — ACTIVITIES OF DAILY LIVING (ADL)
ADLS_ACUITY_SCORE: 18

## 2019-12-25 NOTE — PROGRESS NOTES
LUIS  I: LUIS called  [facility] to arrange Home O2 for patient at discharge. LUIS awaits a c/b from the on-call staff to help assist with obtaining a new Home O2 set-up. LUIS will update patient once O2 is arranged. [facility] will provide portable tank for patient to discharge home with.     ADDENDUM  I: LU[facility] [place] [facility] and [facility] will [facility] shortly once order is placed. LUIS updated MD and patient.       P: LUIS will continue to follow and assist as needed.    Yecenia Prado, PIPO, LGSW  [phone]  [facility]

## 2019-12-25 NOTE — PLAN OF CARE
Pt A/Ox4.  Attempted to wean off O2, but desatted to mid-80's on RA at rest.  Requires 2L O2 at rest and 4L O2 with activity.  Discharged home with home O2, instructions given to pt and wife.  Medications filled at this pharmacy and given to pt.  All questions were answered at this time.  Pt discharged off unit via wheelchair with belongings.

## 2019-12-25 NOTE — PLAN OF CARE
Pt is A/Ox4. VSS, on 2L O2 via NC. C/o pain to right knee (brace in place), tylenol and ice effective. LS diminished, LAROSE, using IS, encouraging ambulation. BNP elevated: IV bumex given today, voiding frequently in urinal. Regular diet, eats food from home. Right PIV SL w/ intermittent abx. Up w/ assist x1 + GB/walker. PT following. Will likely discharge to home tomorrow.

## 2019-12-25 NOTE — DISCHARGE INSTRUCTIONS
You will need 2L O2 while at rest and 4L O2 with activity.  If you are using your pulse oximetry sensor at home, your oxygen should be at least 90%.

## 2019-12-25 NOTE — PROGRESS NOTES
Patient has been assessed for Home Oxygen needs. Oxygen readings:    *Pulse oximetry (SpO2) = 86% on room air at rest while awake.    *SpO2 improved to 92% on 2liters/minute at rest.    *SpO2 = 84% on room air during activity/with exercise.    *SpO2 improved to  91% on 4liters/minute during activity/with exercise.

## 2019-12-25 NOTE — PLAN OF CARE
VSS, 2.5L O2 via NC. A&Ox4. [place]. LS diminished, LAROSE. Voiding per urinal with AUO. Assist of 1 with walker/GB. Regular diet. Knee brace on RLE, has some mild pain managed with prn tylenol & ice. Possible discharge home today.

## 2019-12-25 NOTE — DISCHARGE SUMMARY
[facility]  Hospitalist Discharge Summary       Date of Admission:  12/22/2019  Date of Discharge:  12/25/2019  Discharging Provider: Quintin Fajardo MD      Discharge Diagnoses     CAP  Acute Hypoxic Respiratory Failure  Chronic obstructive pulmonary disease, unspecified COPD type (H)    Follow-ups Needed After Discharge   Follow-up Appointments     Follow-up and recommended labs and tests       Follow up with primary care provider, Karson Bishop, within 7 days for   hospital follow- up.  The following labs/tests are recommended: None.           Unresulted Labs Ordered in the Past 30 Days of this Admission     Date and Time Order Name Status Description    12/22/2019 1400 Blood culture Preliminary     12/22/2019 1400 Blood culture Preliminary         Discharge Disposition   Discharged to home  Condition at discharge: Stable    Hospital Course   Hermilo Velasquez is a 87 year old male admitted on 12/22/2019. He presents with fever.       CAP    Acute Hypoxic Respiratory Failure    Chronic obstructive pulmonary disease, unspecified COPD type (H)    Assessment: presents with fever 100.5F in ED, along with generalized weakness. CXR   shows new patchy bibasilar opacities, likely new pneumonia. Hemodynamics stable. No wheezing on exam. Improving, wbc trending down. Also improved with gentle diuresis.     Plan:   - Ceftin with Azithromycin at discharge  - Supplemental O2 as needed  - Follow up WBC with PCP      GERD (gastroesophageal reflux disease)    Assessment/Plan: PPI as needed      Non Hodgkin's lymphoma (H): 2013    Assessment/Plan: stable, follow as outpatient      Paroxysmal atrial fibrillation (H)    Other chronic pulmonary embolism without acute cor pulmonale (H)    Assessment/Plan: continue warfarin, pharmacy to dose. Continue PTA BB      Essential hypertension with goal blood pressure less than 140/90    Assessment: PTA patient is on BB    Plan:   - Continue PTA BB       Hyperlipidemia LDL  goal <130    Assessment/Plan: continue PTA Lipitor     Consultations This Hospital Stay   PHARMACY TO DOSE WARFARIN  PHYSICAL THERAPY ADULT IP CONSULT  CARE TRANSITION RN/SW IP CONSULT    Code Status   Full Code    Time Spent on this Encounter   I, Quintin Fajardo MD, personally saw the patient today and spent greater than 30 minutes discharging this patient.       Quintin Fajardo MD  [facility]  ______________________________________________________________________    Physical Exam   Vital Signs: Temp: 98.1  F (36.7  C) Temp src: Oral BP: 116/61 Pulse: 72 Heart Rate: 92 Resp: 18 SpO2: 90 % O2 Device: Nasal cannula Oxygen Delivery: 2.5 LPM  Weight: 171 lbs 3.2 oz    Primary Care Physician   Karson Bishop    Discharge Orders      Reason for your hospital stay    You had a fever from likely pneumonia.     Follow-up and recommended labs and tests     Follow up with primary care provider, Karson Bishop, within 7 days for hospital follow- up.  The following labs/tests are recommended: None.     Activity    Your activity upon discharge: activity as tolerated     Oxygen Adult    [place] [facility] Order 2.5 liter(s) by nasal cannula continuously with use of portable tank. Expected treatment length is indefinite (99 months).. Test on conserving device as applicable.    Patients who qualify for home O2 coverage under the CMS guidelines require ABG tests or O2 sat readings obtained closest to, but no earlier than 2 days prior to the discharge, as evidence of the need for home oxygen therapy. Testing must be performed while patient is in the chronic stable state. See notes for O2 sats.    I certify that this patient, Hermilo Velasquez has been under my care and that I, or a nurse practitioner or physician's assistant working with me, had a face-to-face encounter that meets the face-to-face encounter requirements with this patient on 12/24/2019. The patient, Hermilo Velasquez was evaluated or treated in whole, or in  part, for the following medical condition, which necessitates the use of the ordered oxygen. Treatment Diagnosis: COPD    Attending Provider: Quintin Fajardo MD  Physician signature: See electronic signature associated with these discharge orders  Date of Order: December 24, 2019     Diet    Follow this diet upon discharge: Orders Placed This Encounter      Combination Diet Regular Diet Adult       Significant Results and Procedures   Most Recent 3 CBC's:  Recent Labs   Lab Test 12/25/19  0757 12/24/19  0721 12/23/19  0717   WBC 11.1* 13.4* 15.5*   HGB 11.1* 11.0* 10.8*   MCV 90 91 92    184 169     Most Recent 3 BMP's:  Recent Labs   Lab Test 12/25/19  0757 12/24/19  0721 12/23/19  0717    137 139   POTASSIUM 4.3 4.1 4.2   CHLORIDE 104 104 108   CO2 33* 28 28   BUN 14 14 15   CR 0.82 0.84 0.93   ANIONGAP 1* 5 3   AMRITA 8.7 8.4* 8.0*   * 95 95     Most Recent 2 LFT's:  Recent Labs   Lab Test 12/22/19  1342 03/01/19  0928   AST 11 20   ALT 11 15   ALKPHOS 86 91   BILITOTAL 0.7 0.2     Most Recent 3 Troponin's:  Recent Labs   Lab Test 07/23/18  1715 02/10/16  1042 02/09/16  2300   TROPI <0.015 0.054* 0.061*     Most Recent 3 BNP's:  Recent Labs   Lab Test 12/24/19  0721 07/24/18  0805 11/27/15  1545  10/17/14  1655   NTBNPI 3,392* 4,717* 1,568   < >  --    NTBNP  --   --   --   --  1,363*    < > = values in this interval not displayed.   ,   Results for orders placed or performed during the hospital encounter of 12/22/19   XR Chest 2 Views    Narrative    XR CHEST TWO VIEWS   12/22/2019 2:23 PM     HISTORY: Fever.    COMPARISON: 10/1/2019.      Impression    IMPRESSION: Suggestion of new patchy bibasilar opacities. Pneumonia is  a possibility. Hypoinflated lungs. Stable sternotomy. Cardiac  silhouette appears mildly larger.    VERONIKA FREY MD       Discharge Medications   Current Discharge Medication List      START taking these medications    Details   azithromycin (ZITHROMAX) 250 MG tablet Take 1 tablet  (250 mg) by mouth daily for 4 days  Qty: 4 tablet, Refills: 0    Associated Diagnoses: Pneumonia of both lungs due to infectious organism, unspecified part of lung      cefuroxime (CEFTIN) 500 MG tablet Take 1 tablet (500 mg) by mouth 2 times daily for 7 days  Qty: 14 tablet, Refills: 0    Associated Diagnoses: Pneumonia of both lungs due to infectious organism, unspecified part of lung         CONTINUE these medications which have NOT CHANGED    Details   albuterol (PROAIR HFA/PROVENTIL HFA/VENTOLIN HFA) 108 (90 Base) MCG/ACT Inhaler Inhale 1-2 puffs into the lungs every 6 hours as needed for shortness of breath / dyspnea or wheezing  Qty: 3 Inhaler, Refills: 5    Comments: PLEASE DISPENSE PROAIR HFA, this is preferred by insurance  Associated Diagnoses: Chronic obstructive pulmonary disease, unspecified COPD type (H)      atorvastatin (LIPITOR) 10 MG tablet Take 1 tablet (10 mg) by mouth daily  Qty: 90 tablet, Refills: 3    Associated Diagnoses: Mixed hyperlipidemia      famotidine (PEPCID) 40 MG tablet Take 1 tablet (40 mg) by mouth 2 times daily as needed (heartburn)  Qty: 180 tablet, Refills: 3    Associated Diagnoses: Gastroesophageal reflux disease without esophagitis      ferrous sulfate (FEROSUL) 325 (65 Fe) MG tablet Take 1 tablet (325 mg) by mouth every other day  Qty: 60 tablet, Refills: 3    Comments: Dose change  Associated Diagnoses: Iron deficiency anemia, unspecified iron deficiency anemia type      !! gabapentin (NEURONTIN) 300 MG capsule Take 600 mg by mouth At Bedtime Takes 300 mg in the morning and 600 in the evening      !! gabapentin (NEURONTIN) 300 MG capsule Take 300 mg by mouth daily Takes 300 mg in the morning and 600 in the evening      HYDROcodone-acetaminophen (NORCO) 5-325 MG tablet Take 1 tablet by mouth every 6 hours as needed for pain  Qty: 18 tablet, Refills: 0    Associated Diagnoses: Injury of left shoulder, initial encounter; Rib pain on left side; Left elbow pain     "  hydrOXYzine (ATARAX) 25 MG tablet Take 2 tablets (50 mg) by mouth every 6 hours as needed  Qty: 360 tablet, Refills: 3    Associated Diagnoses: Chronic pruritus      loratadine (CLARITIN) 10 MG tablet Take 10 mg by mouth daily as needed for allergies       metoprolol succinate ER (TOPROL-XL) 100 MG 24 hr tablet Take 0.5 tablets (50 mg) by mouth daily  Qty: 45 tablet, Refills: 3    Associated Diagnoses: Benign essential hypertension      tiotropium (SPIRIVA HANDIHALER) 18 MCG capsule Inhale 1 capsule (18 mcg) into the lungs daily  Qty: 90 capsule, Refills: 3    Associated Diagnoses: Chronic obstructive pulmonary disease, unspecified COPD type (H)      triamcinolone (KENALOG) 0.1 % cream Apply topically 2 times daily as needed for irritation      warfarin ANTICOAGULANT (COUMADIN) 5 MG tablet Take 1 tablet (5mg) daily Or as directed by INR nurses  Qty: 90 tablet, Refills: 0    Comments: 5mg daily is the most recent dose Hermilo has been taking for several months.  Associated Diagnoses: Other chronic pulmonary embolism without acute cor pulmonale (H)      order for DME Equipment being ordered: Right neoprene knee brace.  ( Fax to [facility] fax  [phone])  Qty: 1 each, Refills: 0    Associated Diagnoses: Chronic pain of right knee       !! - Potential duplicate medications found. Please discuss with provider.        Allergies   Allergies   Allergen Reactions     Lidocaine Blisters     Allergy to lidocaine ointment     Omeprazole Itching     Pantoprazole Itching     Prevacid [Lansoprazole] Itching     Lasix [Furosemide] Rash     Penicillins Rash     \"broke out from injection\" 60 yrs ago       "

## 2019-12-26 ENCOUNTER — TELEPHONE (OUTPATIENT)
Dept: FAMILY MEDICINE | Facility: CLINIC | Age: 84
End: 2019-12-26

## 2019-12-26 LAB
BACTERIA SPEC CULT: ABNORMAL
Lab: ABNORMAL
SPECIMEN SOURCE: ABNORMAL

## 2019-12-26 NOTE — PLAN OF CARE
Physical Therapy Discharge Summary    Reason for therapy discharge:    Discharged to home.    Progress towards therapy goal(s). See goals on Care Plan in [facility] electronic health record for goal details.  Goals not met.  Barriers to achieving goals:   discharge from facility.    Therapy recommendation(s):    No further therapy is recommended.

## 2019-12-26 NOTE — TELEPHONE ENCOUNTER
"Next 5 appointments (look out 90 days)    Dec 30, 2019  3:30 PM CST  Office Visit with Karson Bishop MD  [facility] [address] [facility] [id number] [phone]        ED / Discharge Outreach Protocol    Patient Contact    Attempt # 1    Was call answered?  Yes.  \"May I please speak with <patient name>\"  Is patient available?   Yes    ED/Discharge Protocol    \"Hi, my name is Yamilex Gutierrez RN, a registered nurse, and I am calling on behalf of Dr. Bishop's office at [facility].  I am calling to follow up and see how things are going for you after your recent visit.\"    \"I see that you were in the (ER/UC/IP) on 12/22-12/25.    How are you doing now that you are home?\" doing okay     Is patient experiencing symptoms that may require a hospital visit?  No     Discharge Instructions    \"Let's review your discharge instructions.  What is/are the follow-up recommendations?  Pt. Response: follow up with PCP     \"Were you instructed to make a follow-up appointment?\"  Pt. Response: Yes.  Has appointment been made?   Yes      \"When you see the provider, I would recommend that you bring your discharge instructions with you.    Medications    \"How many new medications are you on since your hospitalization/ED visit?\"    0-1  \"How many of your current medicines changed (dose, timing, name, etc.) while you were in the hospital/ED visit?\"   0-1  \"Do you have questions about your medications?\"   No  \"Were you newly diagnosed with heart failure, COPD, diabetes or did you have a heart attack?\"   No  For patients on insulin: \"Did you start on insulin in the hospital or did you have your insulin dose changed?\"   No  Post Discharge Medication Reconciliation Status: discharge medications reconciled, continue medications without change.    Was MTM referral placed (*Make sure to put transitions as reason for referral)?   No    Call Summary    \"Do you have any questions or concerns about your " "condition or care plan at the moment?\"    No  Triage nurse advice given: n/a    Patient was in ER 3x in the past year (assess appropriateness of ER visits.)      \"If you have questions or things don't continue to improve, we encourage you contact us through the main clinic number,  ([phone]).  Even if the clinic is not open, triage nurses are available 24/7 to help you.     We would like you to know that our clinic has extended hours (provide information).  We also have urgent care (provide details on closest location and hours/contact info)\"      \"Thank you for your time and take care!\"    Yamilex EVERETT RN      "

## 2019-12-26 NOTE — TELEPHONE ENCOUNTER
Chief Complaint: Pneumonia Of Both Lungs Due To Infectious Organism, Unspecified Part Of Lung,  WED 25-DEC-2019  0 / 1    [phone] (home)

## 2019-12-28 LAB
BACTERIA SPEC CULT: NO GROWTH
Lab: NORMAL
SPECIMEN SOURCE: NORMAL

## 2020-01-13 ENCOUNTER — OFFICE VISIT (OUTPATIENT)
Dept: FAMILY MEDICINE | Facility: CLINIC | Age: 85
End: 2020-01-13
Payer: COMMERCIAL

## 2020-01-13 ENCOUNTER — ANTICOAGULATION THERAPY VISIT (OUTPATIENT)
Dept: NURSING | Facility: CLINIC | Age: 85
End: 2020-01-13
Payer: COMMERCIAL

## 2020-01-13 VITALS
WEIGHT: 180 LBS | OXYGEN SATURATION: 93 % | SYSTOLIC BLOOD PRESSURE: 131 MMHG | HEIGHT: 70 IN | HEART RATE: 62 BPM | DIASTOLIC BLOOD PRESSURE: 65 MMHG | TEMPERATURE: 98.4 F | BODY MASS INDEX: 25.77 KG/M2

## 2020-01-13 DIAGNOSIS — J18.9 COMMUNITY ACQUIRED PNEUMONIA, UNSPECIFIED LATERALITY: ICD-10-CM

## 2020-01-13 DIAGNOSIS — I48.0 PAROXYSMAL ATRIAL FIBRILLATION (H): ICD-10-CM

## 2020-01-13 DIAGNOSIS — I10 ESSENTIAL HYPERTENSION WITH GOAL BLOOD PRESSURE LESS THAN 140/90: ICD-10-CM

## 2020-01-13 DIAGNOSIS — C34.90 MALIGNANT NEOPLASM OF LUNG, UNSPECIFIED LATERALITY, UNSPECIFIED PART OF LUNG (H): ICD-10-CM

## 2020-01-13 DIAGNOSIS — J44.9 CHRONIC OBSTRUCTIVE PULMONARY DISEASE, UNSPECIFIED COPD TYPE (H): ICD-10-CM

## 2020-01-13 DIAGNOSIS — I27.82 OTHER CHRONIC PULMONARY EMBOLISM WITHOUT ACUTE COR PULMONALE (H): ICD-10-CM

## 2020-01-13 DIAGNOSIS — J96.01 ACUTE RESPIRATORY FAILURE WITH HYPOXIA (H): Primary | ICD-10-CM

## 2020-01-13 PROBLEM — I50.9 HEART FAILURE, UNSPECIFIED HF CHRONICITY, UNSPECIFIED HEART FAILURE TYPE (H): Status: ACTIVE | Noted: 2020-01-13

## 2020-01-13 PROBLEM — I50.9 HEART FAILURE, UNSPECIFIED HF CHRONICITY, UNSPECIFIED HEART FAILURE TYPE (H): Status: RESOLVED | Noted: 2020-01-13 | Resolved: 2020-01-13

## 2020-01-13 LAB — INR POINT OF CARE: 1.9 (ref 0.86–1.14)

## 2020-01-13 PROCEDURE — 85610 PROTHROMBIN TIME: CPT | Mod: QW

## 2020-01-13 PROCEDURE — 99214 OFFICE O/P EST MOD 30 MIN: CPT | Performed by: INTERNAL MEDICINE

## 2020-01-13 PROCEDURE — 36416 COLLJ CAPILLARY BLOOD SPEC: CPT

## 2020-01-13 PROCEDURE — 99207 ZZC NO CHARGE NURSE ONLY: CPT

## 2020-01-13 RX ORDER — WARFARIN SODIUM 5 MG/1
TABLET ORAL
Qty: 100 TABLET | Refills: 0 | Status: SHIPPED | OUTPATIENT
Start: 2020-01-13 | End: 2020-03-22

## 2020-01-13 ASSESSMENT — MIFFLIN-ST. JEOR: SCORE: 1497.72

## 2020-01-13 NOTE — PROGRESS NOTES
ANTICOAGULATION FOLLOW-UP CLINIC VISIT    Patient Name:  Hermilo Velasquez  Date:  2020  Contact Type:  Face to Face    SUBJECTIVE:  Patient Findings     Positives:   Change in activity, Hospital admission    Comments:   Pt was recently hospitalized with pneumonia. Pt saw  today and the portable oxygen was discontinued. INR is slightly low today at 1.9. Pt may need more warfarin as he feels better.        Clinical Outcomes     Comments:   Pt was recently hospitalized with pneumonia. Pt saw  today and the portable oxygen was discontinued. INR is slightly low today at 1.9. Pt may need more warfarin as he feels better.           OBJECTIVE    INR Protime   Date Value Ref Range Status   2020 1.9 (A) 0.86 - 1.14 Final       ASSESSMENT / PLAN  INR assessment SUB    Recheck INR In: 10 DAYS    INR Location Clinic      Anticoagulation Summary  As of 2020    INR goal:   2.0-3.0   TTR:   53.2 % (10.9 mo)   INR used for dosin.9! (2020)   Warfarin maintenance plan:   5 mg (5 mg x 1) every day   Full warfarin instructions:   : 7.5 mg; Otherwise 5 mg every day   Weekly warfarin total:   35 mg   Weekly max warfarin dose:   45 mg   Plan last modified:   Mesha Bah RN (2019)   Next INR check:   2020   Target end date:   Indefinite    Indications    Long-term (current) use of anticoagulants [Z79.01] [Z79.01]  Pulmonary embolism  bilateral (H) [I26.99]  Paroxysmal atrial fibrillation (H) [I48.0]             Anticoagulation Episode Summary     INR check location:   Anticoagulation Clinic    Preferred lab:       Send INR reminders to:   TERRIE JACKSON    Comments:         Anticoagulation Care Providers     Provider Role Specialty Phone number    Karson Bishop MD [facility] Internal Medicine [phone]            See the Encounter Report to view Anticoagulation Flowsheet and Dosing Calendar (Go to Encounters tab in chart review, and find the Anticoagulation Therapy  Visit)    Pt INR is 1.9 today. See findings. Pt advised to take 7.5 mg today 1/13/20 then 5 mg daily. Recheck INR in 10 days or sooner as needed.Refill of warfarin sent to pt's preferred pharmacy. Hermilo aware if signs of clotting (pain, tenderness, swelling, color change in leg or arm, SOB) and bleeding occur (blood in stool, urine, large bruising, bleeding gums, nosebleeds) to have INR check sooner. If sx severe report to ER or concerned for stroke call 911. If general questions or concerns arise, call clinic.         Mesha Bah RN

## 2020-01-13 NOTE — PROGRESS NOTES
Subjective     Hermilo Velasquez is a 87 year old male who presents to clinic today for the following health issues:    HPI       Hospital Follow-up Visit:    Hospital/Nursing Home/IP Rehab Facility: [facility]  Date of Admission: 12/22/2019  Date of Discharge: 12/25/2019  Reason(s) for Admission:     CAP  Acute Hypoxic Respiratory Failure  Chronic obstructive pulmonary disease, unspecified COPD type (H)            Problems taking medications regularly:  None       Medication changes since discharge: Finished antibiotics       Problems adhering to non-medication therapy:  None    Summary of hospitalization:  [facility] discharge summary reviewed  Diagnostic Tests/Treatments reviewed.  Follow up needed: none  Other Healthcare Providers Involved in Patient s Care:         None  Update since discharge: improved.     Post Discharge Medication Reconciliation: discharge medications reconciled, continue medications without change.  Plan of care communicated with patient     Coding guidelines for this visit:  Type of Medical   Decision Making Face-to-Face Visit       within 7 Days of discharge Face-to-Face Visit        within 14 days of discharge   Moderate Complexity [id number]   High Complexity [id number]            Hospitalized with hypoxia and bibasilar opacities  Treated for pneumonia with azithromycin and Ceftin  No current cough / fevers or chills  Feels well and no complaint     Patient Active Problem List   Diagnosis     Ventral hernia     Nonrheumatic aortic valve stenosis     GERD (gastroesophageal reflux disease)     Non Hodgkin's lymphoma (H): 2013     Microscopic hematuria     Health Care Home     CARDIOVASCULAR SCREENING; LDL GOAL LESS THAN 130     History of colonic polyps     Neuropathy     Anemia due to blood loss, acute     Physical deconditioning     Paroxysmal atrial fibrillation (H)     Need for SBE (subacute bacterial endocarditis) prophylaxis     RBBB (right bundle branch  block)     Gastrointestinal hemorrhage with melena     Primary osteoarthritis of both knees     Primary osteoarthritis of left hip     Pulmonary embolism, bilateral (H)     S/P IVC filter     Long-term (current) use of anticoagulants [Z79.01]     Benign neoplasm of colon, unspecified part of colon     Pulmonary nodules     Benign neoplasm of colon     Primary osteoarthritis of left knee     Essential hypertension with goal blood pressure less than 140/90     Non-Hodgkin's lymphoma, unspecified body region, unspecified non-Hodgkin lymphoma type (H)     Anticoagulated on Coumadin     Other chronic pulmonary embolism without acute cor pulmonale (H)     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     Aftercare following left knee joint replacement surgery     Drainage from wound: left Knee     Lower extremity edema     Leg edema, left     S/P total knee arthroplasty     ACP (advance care planning)     Hyperlipidemia LDL goal <130     Chronic obstructive pulmonary disease, unspecified COPD type (H)     Iron deficiency anemia due to chronic blood loss     Spongiotic dermatitis     Nodule of lower lobe of right lung     Rotator cuff tendinitis, left     S/P rotator cuff surgery     CAP (community acquired pneumonia)     Malignant neoplasm of lung, unspecified laterality, unspecified part of lung (H)     Acute respiratory failure with hypoxia (H)     Past Surgical History:   Procedure Laterality Date     APPENDECTOMY       AS TOTAL KNEE ARTHROPLASTY       BACK SURGERY       ESOPHAGOSCOPY, GASTROSCOPY, DUODENOSCOPY (EGD), COMBINED N/A 11/28/2015    Procedure: COMBINED ESOPHAGOSCOPY, GASTROSCOPY, DUODENOSCOPY (EGD);  Surgeon: Danis Castillo MD;  Location:  GI     ESOPHAGOSCOPY, GASTROSCOPY, DUODENOSCOPY (EGD), COMBINED N/A 7/26/2018    Procedure: COMBINED ESOPHAGOSCOPY, GASTROSCOPY, DUODENOSCOPY (EGD);;  Surgeon: Toby Dong DO;  Location:  GI     HERNIA REPAIR  2006     HERNIORRHAPHY VENTRAL  4/17/2013     Procedure: HERNIORRHAPHY VENTRAL;  VENTRAL HERNIA REPAIR WITH MESH;  Surgeon: Patel Guzman MD;  Location: [facility] OR     KNEE SURGERY      arthroscopic right knee surgery      LOBECTOMY LUNG Right 3/26/2019    Procedure: POSSIBLE LOBECTOMY LUNG;  Surgeon: Jose A Vasques MD;  Location: [facility] OR     REPAIR LIGAMENT ANKLE  2012    Procedure:REPAIR LIGAMENT ANKLE; LEFT TARSAL TUNNEL RELEASE OF KNOT OF ARIEL RELEASE; Surgeon:SAUL PUENTE; Location:[facility] OR     REPLACE VALVE AORTIC N/A 9/3/2015    Procedure: REPLACE VALVE AORTIC;  Surgeon: Antonino Mitchell MD;  Location: [facility] OR     ROTATOR CUFF REPAIR RT/LT      bilateral     SPINE SURGERY      3 spine surgeries     THORACOTOMY, WEDGE RESECTION LUNG, COMBINED Right 3/26/2019    Procedure: RIGHT THORACOTOMY, WEDGE RESECTION, RIGHT LOWER LOBE LUNG NODULE,;  Surgeon: Jose A Vasques MD;  Location: [facility] OR     TONSILLECTOMY       TURP         Social History     Tobacco Use     Smoking status: Former Smoker     Packs/day: 2.00     Years: 55.00     Pack years: 110.00     Last attempt to quit: 1998     Years since quittin.9     Smokeless tobacco: Never Used   Substance Use Topics     Alcohol use: Yes     Alcohol/week: 0.0 standard drinks     Comment: 1 drink per day     Family History   Problem Relation Age of Onset     C.A.D. Father      Emphysema Father      Melanoma No family hx of      Skin Cancer No family hx of          Current Outpatient Medications   Medication Sig Dispense Refill     albuterol (PROAIR HFA/PROVENTIL HFA/VENTOLIN HFA) 108 (90 Base) MCG/ACT Inhaler Inhale 1-2 puffs into the lungs every 6 hours as needed for shortness of breath / dyspnea or wheezing 3 Inhaler 5     atorvastatin (LIPITOR) 10 MG tablet Take 1 tablet (10 mg) by mouth daily 90 tablet 3     famotidine (PEPCID) 40 MG tablet Take 1 tablet (40 mg) by mouth 2 times daily as needed (heartburn) 180 tablet 3     ferrous sulfate (FEROSUL) 325 (65 Fe)  "MG tablet Take 1 tablet (325 mg) by mouth every other day 60 tablet 3     gabapentin (NEURONTIN) 300 MG capsule Take 600 mg by mouth At Bedtime Takes 300 mg in the morning and 600 in the evening       gabapentin (NEURONTIN) 300 MG capsule Take 300 mg by mouth daily Takes 300 mg in the morning and 600 in the evening       HYDROcodone-acetaminophen (NORCO) 5-325 MG tablet Take 1 tablet by mouth every 6 hours as needed for pain 18 tablet 0     hydrOXYzine (ATARAX) 25 MG tablet Take 2 tablets (50 mg) by mouth every 6 hours as needed 360 tablet 3     loratadine (CLARITIN) 10 MG tablet Take 10 mg by mouth daily as needed for allergies        metoprolol succinate ER (TOPROL-XL) 100 MG 24 hr tablet Take 0.5 tablets (50 mg) by mouth daily 45 tablet 3     order for DME Equipment being ordered: Right neoprene knee brace.  ( Fax to [facility] [employer] fax  [phone]) 1 each 0     tiotropium (SPIRIVA HANDIHALER) 18 MCG capsule Inhale 1 capsule (18 mcg) into the lungs daily 90 capsule 3     triamcinolone (KENALOG) 0.1 % cream Apply topically 2 times daily as needed for irritation       warfarin ANTICOAGULANT (COUMADIN) 5 MG tablet Take 1 tablet (5mg) daily Or as directed by INR nurses 90 tablet 0     Allergies   Allergen Reactions     Lidocaine Blisters     Allergy to lidocaine ointment     Omeprazole Itching     Pantoprazole Itching     Prevacid [Lansoprazole] Itching     Lasix [Furosemide] Rash     Penicillins Rash     \"broke out from injection\" 60 yrs ago         Reviewed and updated as needed this visit by Provider         Review of Systems   ROS COMP: Constitutional, HEENT, cardiovascular, pulmonary, gi and gu systems are negative, except as otherwise noted.      Objective    /65 (BP Location: Right arm, Cuff Size: Adult Regular)   Pulse 62   Temp 98.4  F (36.9  C) (Tympanic)   Ht 1.778 m (5' 10\")   Wt 81.6 kg (180 lb)   SpO2 93%   BMI 25.83 kg/m    Body mass index is 25.83 kg/m .     O2 sat remained > 91 % with " ambulation in the hallway of the clinic with Dr. Bishop      Physical Exam   GENERAL: healthy, alert and no distress  RESP: lungs clear to auscultation - no rales, rhonchi or wheezes; right thoracotomy changes noted   CV: The heart has an irregularly irregular rhythm with a normal rate.   MS: no gross musculoskeletal defects noted, no edema  NEURO: Normal strength and tone, mentation intact and speech normal  PSYCH: mentation appears normal, affect normal/bright    Labs reviewed in Epic         Assessment & Plan     1. Acute respiratory failure with hypoxia (H)  Resolved, no need for supplement oxygen, this can be discontinued     2. Malignant neoplasm of lung, unspecified laterality, unspecified part of lung (H)  Has been stable since thoracotomy; follow up with CT surgery and oncology as directed     3. Chronic obstructive pulmonary disease, unspecified COPD type (H)  Stable     4. Other chronic pulmonary embolism without acute cor pulmonale (H)  On coumadin     5. Paroxysmal atrial fibrillation (H)  On coumadin , rate controlled     6. Community acquired pneumonia, unspecified laterality  Completed antibiotics; clinically improve d    7. Essential hypertension with goal blood pressure less than 140/90  Well controlled           Return in about 6 months (around 7/13/2020) for Recheck .    Karson Bishop MD  [facility]

## 2020-01-15 ENCOUNTER — TRANSFERRED RECORDS (OUTPATIENT)
Dept: HEALTH INFORMATION MANAGEMENT | Facility: CLINIC | Age: 85
End: 2020-01-15

## 2020-01-23 ENCOUNTER — ANTICOAGULATION THERAPY VISIT (OUTPATIENT)
Dept: NURSING | Facility: CLINIC | Age: 85
End: 2020-01-23
Payer: COMMERCIAL

## 2020-01-23 DIAGNOSIS — I26.99 PULMONARY EMBOLISM, BILATERAL (H): ICD-10-CM

## 2020-01-23 DIAGNOSIS — Z79.01 LONG TERM CURRENT USE OF ANTICOAGULANT THERAPY: ICD-10-CM

## 2020-01-23 DIAGNOSIS — I48.0 PAROXYSMAL ATRIAL FIBRILLATION (H): ICD-10-CM

## 2020-01-23 LAB — INR POINT OF CARE: 1.7 (ref 0.86–1.14)

## 2020-01-23 PROCEDURE — 36416 COLLJ CAPILLARY BLOOD SPEC: CPT

## 2020-01-23 PROCEDURE — 85610 PROTHROMBIN TIME: CPT | Mod: QW

## 2020-01-23 PROCEDURE — 99207 ZZC NO CHARGE NURSE ONLY: CPT

## 2020-02-06 ENCOUNTER — ANTICOAGULATION THERAPY VISIT (OUTPATIENT)
Dept: NURSING | Facility: CLINIC | Age: 85
End: 2020-02-06
Payer: COMMERCIAL

## 2020-02-06 DIAGNOSIS — Z79.01 LONG TERM CURRENT USE OF ANTICOAGULANT THERAPY: ICD-10-CM

## 2020-02-06 DIAGNOSIS — I48.0 PAROXYSMAL ATRIAL FIBRILLATION (H): ICD-10-CM

## 2020-02-06 DIAGNOSIS — I26.99 PULMONARY EMBOLISM, BILATERAL (H): ICD-10-CM

## 2020-02-06 LAB — INR POINT OF CARE: 2.1 (ref 0.86–1.14)

## 2020-02-06 PROCEDURE — 99207 ZZC NO CHARGE NURSE ONLY: CPT

## 2020-02-06 PROCEDURE — 36416 COLLJ CAPILLARY BLOOD SPEC: CPT

## 2020-02-06 PROCEDURE — 85610 PROTHROMBIN TIME: CPT | Mod: QW

## 2020-02-12 NOTE — LETTER
----- Message from Candida Chaudhari sent at 2/12/2020 12:36 PM CST -----  Contact: Omid [phone]  Would like to receive medical advice.     Would they like a call back or a response via [facility]:  Call back    Additional information:  Calling to speak with the nurse regarding pt flu test results. Omid is requesting a call back with results.       2/13/2017    Bucky Boone MD  [facility]   [address]  [place] [address]    RE: Hermilo Tatekevin       Dear Colleague,    I had the pleasure of seeing Hermilo Velasquez in the [facility].    Mr. Velasquez is a pleasant 84-year-old gentleman with a history of minimal coronary artery disease, aortic valvular stenosis for which he underwent aortic valve replacement with a bioprosthetic valve in 2015, paroxysmal atrial fibrillation, history of a GI bleed with an AVM and colonic polyps for which he was not initially anticoagulated, history of bilateral pulmonary emboli secondary to a DVT of the left lower extremity for which he underwent IVC placement and was eventually placed on Coumadin who returns in close clinical followup.      The patient was seen by Evelin Brambila in November and has been doing well since then.  Evelin at that time had clarified his beta blocker dose which was unclear.      The patient has been feeling well from a cardiovascular standpoint, denying any chest pain, chest pressure, shortness of breath, orthopnea or paroxysmal nocturnal dyspnea.      His most recent transthoracic echocardiogram was performed in July and showed a visual ejection fraction of 50-55% with a mildly dilated RV with normal RV function.  The aortic valve was not well seen, but was functioning normally.      Please see my separate note with his full physical examination.      Outpatient Encounter Prescriptions as of 2/13/2017   Medication Sig Dispense Refill     [DISCONTINUED] cyclobenzaprine (FLEXERIL) 10 MG tablet Take 1 tablet (10 mg) by mouth 3 times daily as needed for muscle spasms 15 tablet 0     ranitidine (ZANTAC) 150 MG tablet Take 1 tablet (150 mg) by mouth 2 times daily 60 tablet 3     cetirizine (ZYRTEC) 10 MG tablet Take 1 tablet (10 mg) by mouth every evening 30 tablet 11     triamcinolone (KENALOG) 0.1 % cream Apply topically 2 times  daily       hydrOXYzine (ATARAX) 25 MG tablet Take 1-2 tablets (25-50 mg) by mouth every 6 hours as needed for itching 120 tablet 3     warfarin (COUMADIN) 5 MG tablet Take 1-1.5 tablets (5-7.5 mg) by mouth See Admin Instructions Take as prescribed by INR Clinic 100 tablet 0     metoprolol (TOPROL-XL) 100 MG 24 hr tablet Take 1 tablet (100 mg) by mouth daily 180 tablet 3     fluticasone (FLONASE) 50 MCG/ACT nasal spray Spray 1 spray into both nostrils daily       sodium chloride (OCEAN) 0.65 % nasal spray Spray 1 spray into both nostrils daily       [DISCONTINUED] atorvastatin (LIPITOR) 10 MG tablet Take 1 tablet (10 mg) by mouth daily 90 tablet 3     No facility-administered encounter medications on file as of 2/13/2017.       IMPRESSION AND PLAN:  Mr. Velasquez is a pleasant 84-year-old gentleman with a history of a bioprosthetic aortic valve replacement in 2015, minimal coronary artery disease detected at the time of his preoperative evaluation, history of DVT and PE, along with paroxysmal atrial fibrillation, and a history of a GI bleed complicated by AVN and colonic polyps who has been doing well from a cardiovascular standpoint.  He has had no bleeding difficulties on his warfarin.  At this time, given his history of a PE and paroxysmal atrial fibrillation with a CHADS-VASc score of at least 3, I would recommend continued anticoagulation with warfarin.  His blood pressure is well controlled and I will continue his beta blocker dose unchanged at the present time.  On exam, he appears to be in sinus rhythm.  He has been tolerating his low high-dose atorvastatin well and his most recent lipids that were performed today show an LDL of 69.  I have asked the patient to come back and see me in routine followup in 1 year.     Again, thank you for allowing me to participate in the care of your patient.      Sincerely,    Mario Kelly MD     [facility]

## 2020-02-27 ENCOUNTER — ANTICOAGULATION THERAPY VISIT (OUTPATIENT)
Dept: NURSING | Facility: CLINIC | Age: 85
End: 2020-02-27
Payer: COMMERCIAL

## 2020-02-27 DIAGNOSIS — I26.99 PULMONARY EMBOLISM, BILATERAL (H): ICD-10-CM

## 2020-02-27 DIAGNOSIS — I48.0 PAROXYSMAL ATRIAL FIBRILLATION (H): ICD-10-CM

## 2020-02-27 DIAGNOSIS — Z79.01 LONG TERM CURRENT USE OF ANTICOAGULANT THERAPY: ICD-10-CM

## 2020-02-27 LAB — INR POINT OF CARE: 2 (ref 0.86–1.14)

## 2020-02-27 PROCEDURE — 85610 PROTHROMBIN TIME: CPT | Mod: QW

## 2020-02-27 PROCEDURE — 36416 COLLJ CAPILLARY BLOOD SPEC: CPT

## 2020-02-27 PROCEDURE — 99207 ZZC NO CHARGE NURSE ONLY: CPT

## 2020-02-27 NOTE — PROGRESS NOTES
ANTICOAGULATION FOLLOW-UP CLINIC VISIT    Patient Name:  Hermilo Velasquez  Date:  2020  Contact Type:  Face to Face    SUBJECTIVE:  Patient Findings     Comments:   The patient was assessed for diet, medication, and activity level changes, missed or extra doses, bruising or bleeding, with no problem findings.          Clinical Outcomes     Negatives:   Major bleeding event, Thromboembolic event, Anticoagulation-related hospital admission, Anticoagulation-related ED visit, Anticoagulation-related fatality    Comments:   The patient was assessed for diet, medication, and activity level changes, missed or extra doses, bruising or bleeding, with no problem findings.             OBJECTIVE    INR Protime   Date Value Ref Range Status   2020 2.0 (A) 0.86 - 1.14 Final       ASSESSMENT / PLAN  No question data found.  Anticoagulation Summary  As of 2020    INR goal:   2.0-3.0   TTR:   47.1 % (10.9 mo)   INR used for dosin.0 (2020)   Warfarin maintenance plan:   7.5 mg (5 mg x 1.5) every Thu; 5 mg (5 mg x 1) all other days   Full warfarin instructions:   7.5 mg every Thu; 5 mg all other days   Weekly warfarin total:   37.5 mg   Weekly max warfarin dose:   45 mg   Plan last modified:   Tammy Peters RN (2020)   Next INR check:   3/26/2020   Priority:   Maintenance   Target end date:   Indefinite    Indications    Long-term (current) use of anticoagulants [Z79.01] [Z79.01]  Pulmonary embolism  bilateral (H) [I26.99]  Paroxysmal atrial fibrillation (H) [I48.0]             Anticoagulation Episode Summary     INR check location:   Anticoagulation Clinic    Preferred lab:       Send INR reminders to:   TERRIE JACKSON    Comments:         Anticoagulation Care Providers     Provider Role Specialty Phone number    Karson Bishop MD Responsible Internal Medicine [phone]            See the Encounter Report to view Anticoagulation Flowsheet and Dosing Calendar (Go to Encounters tab in chart  review, and find the Anticoagulation Therapy Visit)    Dosage adjustment made based on physician directed care plan.    Patient states that he thinks that he may have missed his dose on Monday.  Will continue same dosing and recheck in 4 weeks    Tammy Peters RN

## 2020-03-11 DIAGNOSIS — K21.9 GASTROESOPHAGEAL REFLUX DISEASE WITHOUT ESOPHAGITIS: ICD-10-CM

## 2020-03-11 NOTE — TELEPHONE ENCOUNTER
"famotidine (PEPCID) 40 MG tablet  180 tablet  3  11/29/2019          Last Written Prescription Date:  11/29/2019  Last Fill Quantity: 180,  # refills: 3   Last office visit: 1/13/2020 with prescribing provider:     Future Office Visit:  Unknown    Requested Prescriptions   Pending Prescriptions Disp Refills     famotidine (PEPCID) 40 MG tablet 180 tablet 3     Sig: Take 1 tablet (40 mg) by mouth 2 times daily as needed (heartburn)       H2 Blockers Protocol Passed - 3/11/2020 10:35 AM        Passed - Patient is age 12 or older        Passed - Recent (12 mo) or future (30 days) visit within the authorizing provider's specialty     Patient has had an office visit with the authorizing provider or a provider within the authorizing providers department within the previous 12 mos or has a future within next 30 days. See \"Patient Info\" tab in inbasket, or \"Choose Columns\" in Meds & Orders section of the refill encounter.              Passed - Medication is active on med list             "

## 2020-03-11 NOTE — TELEPHONE ENCOUNTER
Reason for Call:  Other prescription    Detailed comments: will like doctor to verify rx for famotidine (PEPCID) 40 MG tablet. Please call [phone]    Phone Number Patient can be reached at: Home number on file [phone] (home)    Best Time: any    Can we leave a detailed message on this number? YES    Call taken on 3/11/2020 at 10:18 AM by Rose Davis

## 2020-03-12 RX ORDER — FAMOTIDINE 40 MG/1
40 TABLET, FILM COATED ORAL 2 TIMES DAILY PRN
Qty: 180 TABLET | Refills: 1 | Status: ON HOLD | OUTPATIENT
Start: 2020-03-12 | End: 2020-08-18

## 2020-03-18 ENCOUNTER — OFFICE VISIT (OUTPATIENT)
Dept: FAMILY MEDICINE | Facility: CLINIC | Age: 85
End: 2020-03-18
Payer: COMMERCIAL

## 2020-03-18 ENCOUNTER — TELEPHONE (OUTPATIENT)
Dept: FAMILY MEDICINE | Facility: CLINIC | Age: 85
End: 2020-03-18

## 2020-03-18 VITALS
HEART RATE: 75 BPM | DIASTOLIC BLOOD PRESSURE: 54 MMHG | WEIGHT: 181 LBS | BODY MASS INDEX: 25.91 KG/M2 | TEMPERATURE: 98.6 F | OXYGEN SATURATION: 93 % | SYSTOLIC BLOOD PRESSURE: 155 MMHG | HEIGHT: 70 IN

## 2020-03-18 DIAGNOSIS — S51.012A SKIN TEAR OF LEFT ELBOW WITHOUT COMPLICATION, INITIAL ENCOUNTER: Primary | ICD-10-CM

## 2020-03-18 DIAGNOSIS — L03.114 CELLULITIS OF LEFT UPPER EXTREMITY: ICD-10-CM

## 2020-03-18 PROCEDURE — 99214 OFFICE O/P EST MOD 30 MIN: CPT | Performed by: INTERNAL MEDICINE

## 2020-03-18 RX ORDER — CEPHALEXIN 500 MG/1
500 CAPSULE ORAL 2 TIMES DAILY
Qty: 14 CAPSULE | Refills: 0 | Status: ON HOLD | OUTPATIENT
Start: 2020-03-18 | End: 2020-04-02

## 2020-03-18 ASSESSMENT — MIFFLIN-ST. JEOR: SCORE: 1502.26

## 2020-03-18 NOTE — PROGRESS NOTES
"Subjective     Hermilo Velasquez is a 87 year old male who presents to clinic today for the following health issues:    HPI       LEFT ARM INJURY/SKIN TEAR/POSSIBLE INFECTION    Patient reports he fell out of bed on Saturday and hit left elbow on dresser.   5\" x 3\" tear on elbow.      Symptoms today:  From left elbow to left hand---red, swollen, hot to touch, mildly painful.  Tear on elbow draining yellow pus.      Patient not sure if he has fever, however states \" I feel lousy\".                   Patient Active Problem List   Diagnosis     Ventral hernia     Nonrheumatic aortic valve stenosis     GERD (gastroesophageal reflux disease)     Non Hodgkin's lymphoma (H): 2013     Microscopic hematuria     Health Care Home     CARDIOVASCULAR SCREENING; LDL GOAL LESS THAN 130     History of colonic polyps     Neuropathy     Anemia due to blood loss, acute     Physical deconditioning     Paroxysmal atrial fibrillation (H)     Need for SBE (subacute bacterial endocarditis) prophylaxis     RBBB (right bundle branch block)     Gastrointestinal hemorrhage with melena     Primary osteoarthritis of both knees     Primary osteoarthritis of left hip     Pulmonary embolism, bilateral (H)     S/P IVC filter     Long-term (current) use of anticoagulants [Z79.01]     Benign neoplasm of colon, unspecified part of colon     Pulmonary nodules     Benign neoplasm of colon     Primary osteoarthritis of left knee     Essential hypertension with goal blood pressure less than 140/90     Non-Hodgkin's lymphoma, unspecified body region, unspecified non-Hodgkin lymphoma type (H)     Anticoagulated on Coumadin     Other chronic pulmonary embolism without acute cor pulmonale (H)     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     Aftercare following left knee joint replacement surgery     Drainage from wound: left Knee     Lower extremity edema     Leg edema, left     S/P total knee arthroplasty     ACP (advance care planning)     " Hyperlipidemia LDL goal <130     Chronic obstructive pulmonary disease, unspecified COPD type (H)     Iron deficiency anemia due to chronic blood loss     Spongiotic dermatitis     Nodule of lower lobe of right lung     Rotator cuff tendinitis, left     S/P rotator cuff surgery     CAP (community acquired pneumonia)     Malignant neoplasm of lung, unspecified laterality, unspecified part of lung (H)     Acute respiratory failure with hypoxia (H)     Past Surgical History:   Procedure Laterality Date     APPENDECTOMY       AS TOTAL KNEE ARTHROPLASTY       BACK SURGERY       ESOPHAGOSCOPY, GASTROSCOPY, DUODENOSCOPY (EGD), COMBINED N/A 11/28/2015    Procedure: COMBINED ESOPHAGOSCOPY, GASTROSCOPY, DUODENOSCOPY (EGD);  Surgeon: Danis Castillo MD;  Location:  GI     ESOPHAGOSCOPY, GASTROSCOPY, DUODENOSCOPY (EGD), COMBINED N/A 7/26/2018    Procedure: COMBINED ESOPHAGOSCOPY, GASTROSCOPY, DUODENOSCOPY (EGD);;  Surgeon: Toby Dong DO;  Location:  GI     HERNIA REPAIR  2006     HERNIORRHAPHY VENTRAL  4/17/2013    Procedure: HERNIORRHAPHY VENTRAL;  VENTRAL HERNIA REPAIR WITH MESH;  Surgeon: Patel Guzman MD;  Location:  OR     KNEE SURGERY      arthroscopic right knee surgery      LOBECTOMY LUNG Right 3/26/2019    Procedure: POSSIBLE LOBECTOMY LUNG;  Surgeon: Jose A Vasques MD;  Location:  OR     REPAIR LIGAMENT ANKLE  2/23/2012    Procedure:REPAIR LIGAMENT ANKLE; LEFT TARSAL TUNNEL RELEASE OF KNOT OF ARIEL RELEASE; Surgeon:SAUL PUENTE; Location: OR     REPLACE VALVE AORTIC N/A 9/3/2015    Procedure: REPLACE VALVE AORTIC;  Surgeon: Antonino Mitchell MD;  Location:  OR     ROTATOR CUFF REPAIR RT/LT      bilateral     SPINE SURGERY      3 spine surgeries     THORACOTOMY, WEDGE RESECTION LUNG, COMBINED Right 3/26/2019    Procedure: RIGHT THORACOTOMY, WEDGE RESECTION, RIGHT LOWER LOBE LUNG NODULE,;  Surgeon: Jose A Vasques MD;  Location:  OR      TONSILLECTOMY       TURP         Social History     Tobacco Use     Smoking status: Former Smoker     Packs/day: 2.00     Years: 55.00     Pack years: 110.00     Last attempt to quit: 1998     Years since quittin.1     Smokeless tobacco: Never Used   Substance Use Topics     Alcohol use: Yes     Alcohol/week: 0.0 standard drinks     Comment: 1 drink per day     Family History   Problem Relation Age of Onset     C.A.D. Father      Emphysema Father      Melanoma No family hx of      Skin Cancer No family hx of          Current Outpatient Medications   Medication Sig Dispense Refill     albuterol (PROAIR HFA/PROVENTIL HFA/VENTOLIN HFA) 108 (90 Base) MCG/ACT Inhaler Inhale 1-2 puffs into the lungs every 6 hours as needed for shortness of breath / dyspnea or wheezing 3 Inhaler 5     atorvastatin (LIPITOR) 10 MG tablet Take 1 tablet (10 mg) by mouth daily 90 tablet 3     cephALEXin (KEFLEX) 500 MG capsule Take 1 capsule (500 mg) by mouth 2 times daily 14 capsule 0     famotidine (PEPCID) 40 MG tablet Take 1 tablet (40 mg) by mouth 2 times daily as needed (heartburn) 180 tablet 1     ferrous sulfate (FEROSUL) 325 (65 Fe) MG tablet Take 1 tablet (325 mg) by mouth every other day 60 tablet 3     gabapentin (NEURONTIN) 300 MG capsule Take 600 mg by mouth At Bedtime Takes 300 mg in the morning and 600 in the evening       gabapentin (NEURONTIN) 300 MG capsule Take 300 mg by mouth daily Takes 300 mg in the morning and 600 in the evening       HYDROcodone-acetaminophen (NORCO) 5-325 MG tablet Take 1 tablet by mouth every 6 hours as needed for pain 18 tablet 0     hydrOXYzine (ATARAX) 25 MG tablet Take 2 tablets (50 mg) by mouth every 6 hours as needed 360 tablet 3     loratadine (CLARITIN) 10 MG tablet Take 10 mg by mouth daily as needed for allergies        metoprolol succinate ER (TOPROL-XL) 100 MG 24 hr tablet Take 0.5 tablets (50 mg) by mouth daily 45 tablet 3     order for DME Equipment being ordered: Right  "neoprene knee brace.  ( Fax to [facility] fax  [phone]) 1 each 0     tiotropium (SPIRIVA HANDIHALER) 18 MCG capsule Inhale 1 capsule (18 mcg) into the lungs daily 90 capsule 3     triamcinolone (KENALOG) 0.1 % cream Apply topically 2 times daily as needed for irritation       warfarin ANTICOAGULANT (COUMADIN) 5 MG tablet Take 1 tablet (5mg) daily Or as directed by INR nurses 100 tablet 0     Allergies   Allergen Reactions     Lidocaine Blisters     Allergy to lidocaine ointment     Omeprazole Itching     Pantoprazole Itching     Prevacid [Lansoprazole] Itching     Lasix [Furosemide] Rash     Penicillins Rash     \"broke out from injection\" 60 yrs ago         Reviewed and updated as needed this visit by Provider         Review of Systems   ROS COMP: no fevers or chills, no numbness or tingling in hand/fingers      Objective    BP (!) 155/54 (BP Location: Right arm, Cuff Size: Adult Regular)   Pulse 75   Temp 98.6  F (37  C) (Tympanic)   Ht 1.778 m (5' 10\")   Wt 82.1 kg (181 lb)   SpO2 93%   BMI 25.97 kg/m    Body mass index is 25.97 kg/m .  Physical Exam   General: This is a nontoxic-appearing elderly man in no acute distress.  Skin: There is a large skin tear overlying the left elbow with a small area of mildly tender erythema extending from the margins of the skin tear both proximally and distally.  There is no areas of fluctuance.  There is full range of motion of the elbow joint and no bony tenderness.  Distal circulation, sensation and motor function is intact.          Assessment & Plan       ICD-10-CM    1. Skin tear of left elbow without complication, initial encounter  S51.012A    2. Cellulitis of left upper extremity  L03.114 cephALEXin (KEFLEX) 500 MG capsule        Skin tear with associated mild cellulitis, skin tear dressed with Vaseline gauze and patient educated on how to do daily dressing changes on himself, start oral antibiotics because of mild surrounding cellulitis, call if symptoms " persist beyond 7 days of antibiotics, call sooner if symptoms worsen despite antibiotics.  Otherwise, return in July for regularly scheduled preventive exam provided that the viral pandemic is more stabilized then.        Return in about 4 months (around 7/18/2020) for Preventive Visit.    Karson Bishop MD  [facility]

## 2020-03-18 NOTE — TELEPHONE ENCOUNTER
Reason for call:  Symptom   Symptom or request:Pt has 3 x 5 inch scrape on left elbow from fall Johnny 3/15/2020. Red, yellow pus.Appt scheduled with  today at 4:30 but informed pt we may have to change the time or another provider.    Duration (how long have symptoms been present): last Sunday  Have you been treated for this before? No    Additional comments: Pt thinks it may be infected    Phone number to reach patient:  Home number on file [phone] (home)    Best Time:  anytime    Can we leave a detailed message on this number?  YES

## 2020-03-18 NOTE — TELEPHONE ENCOUNTER
"Called and spoke with patient.     Patient reports he fell out of bed on Saturday and hit left elbow on dresser.   5\" x 3\" tear on elbow.     Symptoms today:  From left elbow to left hand---red, swollen, hot to touch, painful.  Tear on elbow draining yellow pus.     Patient not sure if he has fever, however states \" I feel lousy\".      Currently scheduled with Dr Bishop in clinic today at 4:30pm.      Will huddle with PCP.     Evie VERA RN,BSN      "

## 2020-03-22 ENCOUNTER — APPOINTMENT (OUTPATIENT)
Dept: CT IMAGING | Facility: CLINIC | Age: 85
DRG: 871 | End: 2020-03-22
Attending: NURSE PRACTITIONER
Payer: COMMERCIAL

## 2020-03-22 ENCOUNTER — HOSPITAL ENCOUNTER (INPATIENT)
Facility: CLINIC | Age: 85
LOS: 11 days | Discharge: HOME HEALTH | DRG: 871 | End: 2020-04-02
Attending: EMERGENCY MEDICINE | Admitting: HOSPITALIST
Payer: COMMERCIAL

## 2020-03-22 DIAGNOSIS — N17.9 ACUTE RENAL FAILURE, UNSPECIFIED ACUTE RENAL FAILURE TYPE (H): ICD-10-CM

## 2020-03-22 DIAGNOSIS — I10 BENIGN ESSENTIAL HYPERTENSION: ICD-10-CM

## 2020-03-22 DIAGNOSIS — I21.4 NSTEMI (NON-ST ELEVATED MYOCARDIAL INFARCTION) (H): ICD-10-CM

## 2020-03-22 DIAGNOSIS — D62 ANEMIA DUE TO BLOOD LOSS, ACUTE: ICD-10-CM

## 2020-03-22 DIAGNOSIS — A41.9 SEPTIC SHOCK (H): ICD-10-CM

## 2020-03-22 DIAGNOSIS — K92.2 GASTROINTESTINAL HEMORRHAGE, UNSPECIFIED GASTROINTESTINAL HEMORRHAGE TYPE: ICD-10-CM

## 2020-03-22 DIAGNOSIS — I26.99 PULMONARY EMBOLISM, BILATERAL (H): Primary | ICD-10-CM

## 2020-03-22 DIAGNOSIS — R65.21 SEPTIC SHOCK (H): ICD-10-CM

## 2020-03-22 DIAGNOSIS — J44.9 CHRONIC OBSTRUCTIVE PULMONARY DISEASE, UNSPECIFIED COPD TYPE (H): ICD-10-CM

## 2020-03-22 PROBLEM — K92.0 HEMATEMESIS: Status: ACTIVE | Noted: 2020-03-22

## 2020-03-22 LAB
ABO + RH BLD: NORMAL
ABO + RH BLD: NORMAL
ALBUMIN SERPL-MCNC: 2.6 G/DL (ref 3.4–5)
ALBUMIN UR-MCNC: 10 MG/DL
ALP SERPL-CCNC: 80 U/L (ref 40–150)
ALT SERPL W P-5'-P-CCNC: 27 U/L (ref 0–70)
ANION GAP SERPL CALCULATED.3IONS-SCNC: 9 MMOL/L (ref 3–14)
APPEARANCE UR: CLEAR
APTT PPP: 30 SEC (ref 22–37)
AST SERPL W P-5'-P-CCNC: 101 U/L (ref 0–45)
BASOPHILS # BLD AUTO: 0 10E9/L (ref 0–0.2)
BASOPHILS NFR BLD AUTO: 0 %
BILIRUB SERPL-MCNC: 0.6 MG/DL (ref 0.2–1.3)
BILIRUB UR QL STRIP: NEGATIVE
BLD GP AB SCN SERPL QL: NORMAL
BLD PROD TYP BPU: NORMAL
BLD PROD TYP BPU: NORMAL
BLD UNIT ID BPU: 0
BLOOD BANK CMNT PATIENT-IMP: NORMAL
BLOOD PRODUCT CODE: NORMAL
BPU ID: NORMAL
BUN SERPL-MCNC: 33 MG/DL (ref 7–30)
CALCIUM SERPL-MCNC: 8.4 MG/DL (ref 8.5–10.1)
CHLORIDE SERPL-SCNC: 103 MMOL/L (ref 94–109)
CO2 SERPL-SCNC: 26 MMOL/L (ref 20–32)
COLOR UR AUTO: YELLOW
CREAT SERPL-MCNC: 2.57 MG/DL (ref 0.66–1.25)
CREAT UR-MCNC: 107 MG/DL
DIFFERENTIAL METHOD BLD: ABNORMAL
EOSINOPHIL # BLD AUTO: 0 10E9/L (ref 0–0.7)
EOSINOPHIL NFR BLD AUTO: 0 %
ERYTHROCYTE [DISTWIDTH] IN BLOOD BY AUTOMATED COUNT: 14.1 % (ref 10–15)
FLUAV+FLUBV AG SPEC QL: NEGATIVE
FLUAV+FLUBV AG SPEC QL: NEGATIVE
FRACT EXCRET NA UR+SERPL-RTO: 1.2 %
GFR SERPL CREATININE-BSD FRML MDRD: 21 ML/MIN/{1.73_M2}
GLUCOSE BLDC GLUCOMTR-MCNC: 117 MG/DL (ref 70–99)
GLUCOSE SERPL-MCNC: 147 MG/DL (ref 70–99)
GLUCOSE UR STRIP-MCNC: NEGATIVE MG/DL
HCT VFR BLD AUTO: 41.3 % (ref 40–53)
HEMOCCULT STL QL: POSITIVE
HGB BLD-MCNC: 12.3 G/DL (ref 13.3–17.7)
HGB BLD-MCNC: 12.8 G/DL (ref 13.3–17.7)
HGB UR QL STRIP: NEGATIVE
INR PPP: 1.93 (ref 0.86–1.14)
INTERPRETATION ECG - MUSE: NORMAL
KETONES UR STRIP-MCNC: NEGATIVE MG/DL
LACTATE BLD-SCNC: 3.8 MMOL/L (ref 0.7–2)
LACTATE BLD-SCNC: 8.1 MMOL/L (ref 0.7–2)
LEUKOCYTE ESTERASE UR QL STRIP: NEGATIVE
LIPASE SERPL-CCNC: 54 U/L (ref 73–393)
LYMPHOCYTES # BLD AUTO: 0.6 10E9/L (ref 0.8–5.3)
LYMPHOCYTES NFR BLD AUTO: 2 %
MCH RBC QN AUTO: 27.6 PG (ref 26.5–33)
MCHC RBC AUTO-ENTMCNC: 31 G/DL (ref 31.5–36.5)
MCV RBC AUTO: 89 FL (ref 78–100)
METAMYELOCYTES # BLD: 2.2 10E9/L
METAMYELOCYTES NFR BLD MANUAL: 7 %
MONOCYTES # BLD AUTO: 1.3 10E9/L (ref 0–1.3)
MONOCYTES NFR BLD AUTO: 4 %
MUCOUS THREADS #/AREA URNS LPF: PRESENT /LPF
NEUTROPHILS # BLD AUTO: 27.9 10E9/L (ref 1.6–8.3)
NEUTROPHILS NFR BLD AUTO: 87 %
NITRATE UR QL: NEGATIVE
NUM BPU REQUESTED: 1
PH UR STRIP: 6 PH (ref 5–7)
PLATELET # BLD AUTO: 214 10E9/L (ref 150–450)
PLATELET # BLD EST: ABNORMAL 10*3/UL
POTASSIUM SERPL-SCNC: 4.7 MMOL/L (ref 3.4–5.3)
PROT SERPL-MCNC: 6.2 G/DL (ref 6.8–8.8)
RBC # BLD AUTO: 4.63 10E12/L (ref 4.4–5.9)
RBC #/AREA URNS AUTO: <1 /HPF (ref 0–2)
RBC MORPH BLD: ABNORMAL
SODIUM SERPL-SCNC: 138 MMOL/L (ref 133–144)
SODIUM UR-SCNC: 68 MMOL/L
SOURCE: ABNORMAL
SP GR UR STRIP: 1.01 (ref 1–1.03)
SPECIMEN EXP DATE BLD: NORMAL
SPECIMEN SOURCE: NORMAL
TOXIC GRANULES BLD QL SMEAR: PRESENT
TRANSFUSION STATUS PATIENT QL: NORMAL
TRANSFUSION STATUS PATIENT QL: NORMAL
TROPONIN I SERPL-MCNC: 0.58 UG/L (ref 0–0.04)
UROBILINOGEN UR STRIP-MCNC: NORMAL MG/DL (ref 0–2)
WBC # BLD AUTO: 32.1 10E9/L (ref 4–11)
WBC #/AREA URNS AUTO: <1 /HPF (ref 0–5)

## 2020-03-22 PROCEDURE — 96368 THER/DIAG CONCURRENT INF: CPT

## 2020-03-22 PROCEDURE — 86900 BLOOD TYPING SEROLOGIC ABO: CPT | Performed by: NURSE PRACTITIONER

## 2020-03-22 PROCEDURE — 82272 OCCULT BLD FECES 1-3 TESTS: CPT | Performed by: NURSE PRACTITIONER

## 2020-03-22 PROCEDURE — 83605 ASSAY OF LACTIC ACID: CPT | Performed by: NURSE PRACTITIONER

## 2020-03-22 PROCEDURE — 86923 COMPATIBILITY TEST ELECTRIC: CPT | Performed by: NURSE PRACTITIONER

## 2020-03-22 PROCEDURE — 25000128 H RX IP 250 OP 636: Performed by: HOSPITALIST

## 2020-03-22 PROCEDURE — 83605 ASSAY OF LACTIC ACID: CPT | Performed by: EMERGENCY MEDICINE

## 2020-03-22 PROCEDURE — 96375 TX/PRO/DX INJ NEW DRUG ADDON: CPT

## 2020-03-22 PROCEDURE — 93005 ELECTROCARDIOGRAM TRACING: CPT

## 2020-03-22 PROCEDURE — 84300 ASSAY OF URINE SODIUM: CPT | Performed by: EMERGENCY MEDICINE

## 2020-03-22 PROCEDURE — 86901 BLOOD TYPING SEROLOGIC RH(D): CPT | Performed by: NURSE PRACTITIONER

## 2020-03-22 PROCEDURE — 25000128 H RX IP 250 OP 636: Performed by: NURSE PRACTITIONER

## 2020-03-22 PROCEDURE — C9113 INJ PANTOPRAZOLE SODIUM, VIA: HCPCS | Performed by: NURSE PRACTITIONER

## 2020-03-22 PROCEDURE — 00000146 ZZHCL STATISTIC GLUCOSE BY METER IP

## 2020-03-22 PROCEDURE — 96374 THER/PROPH/DIAG INJ IV PUSH: CPT | Mod: 59

## 2020-03-22 PROCEDURE — U0001 2019-NCOV DIAGNOSTIC P: HCPCS | Performed by: NURSE PRACTITIONER

## 2020-03-22 PROCEDURE — 87804 INFLUENZA ASSAY W/OPTIC: CPT | Performed by: NURSE PRACTITIONER

## 2020-03-22 PROCEDURE — 85025 COMPLETE CBC W/AUTO DIFF WBC: CPT | Performed by: NURSE PRACTITIONER

## 2020-03-22 PROCEDURE — 36430 TRANSFUSION BLD/BLD COMPNT: CPT

## 2020-03-22 PROCEDURE — 20000003 ZZH R&B ICU

## 2020-03-22 PROCEDURE — 51702 INSERT TEMP BLADDER CATH: CPT

## 2020-03-22 PROCEDURE — 84484 ASSAY OF TROPONIN QUANT: CPT | Performed by: NURSE PRACTITIONER

## 2020-03-22 PROCEDURE — 96365 THER/PROPH/DIAG IV INF INIT: CPT

## 2020-03-22 PROCEDURE — P9016 RBC LEUKOCYTES REDUCED: HCPCS | Performed by: NURSE PRACTITIONER

## 2020-03-22 PROCEDURE — 99223 1ST HOSP IP/OBS HIGH 75: CPT | Mod: AI | Performed by: HOSPITALIST

## 2020-03-22 PROCEDURE — 81001 URINALYSIS AUTO W/SCOPE: CPT | Performed by: EMERGENCY MEDICINE

## 2020-03-22 PROCEDURE — 85730 THROMBOPLASTIN TIME PARTIAL: CPT | Performed by: NURSE PRACTITIONER

## 2020-03-22 PROCEDURE — 87086 URINE CULTURE/COLONY COUNT: CPT | Performed by: EMERGENCY MEDICINE

## 2020-03-22 PROCEDURE — 87800 DETECT AGNT MULT DNA DIREC: CPT | Performed by: NURSE PRACTITIONER

## 2020-03-22 PROCEDURE — 83690 ASSAY OF LIPASE: CPT | Performed by: NURSE PRACTITIONER

## 2020-03-22 PROCEDURE — 25000128 H RX IP 250 OP 636: Performed by: EMERGENCY MEDICINE

## 2020-03-22 PROCEDURE — 84145 PROCALCITONIN (PCT): CPT | Performed by: HOSPITALIST

## 2020-03-22 PROCEDURE — 96366 THER/PROPH/DIAG IV INF ADDON: CPT

## 2020-03-22 PROCEDURE — 25800030 ZZH RX IP 258 OP 636: Performed by: HOSPITALIST

## 2020-03-22 PROCEDURE — 99292 CRITICAL CARE ADDL 30 MIN: CPT

## 2020-03-22 PROCEDURE — 85018 HEMOGLOBIN: CPT | Performed by: HOSPITALIST

## 2020-03-22 PROCEDURE — 82570 ASSAY OF URINE CREATININE: CPT | Performed by: EMERGENCY MEDICINE

## 2020-03-22 PROCEDURE — 80053 COMPREHEN METABOLIC PANEL: CPT | Performed by: NURSE PRACTITIONER

## 2020-03-22 PROCEDURE — 87186 SC STD MICRODIL/AGAR DIL: CPT | Performed by: NURSE PRACTITIONER

## 2020-03-22 PROCEDURE — 99291 CRITICAL CARE FIRST HOUR: CPT | Mod: 25

## 2020-03-22 PROCEDURE — 85610 PROTHROMBIN TIME: CPT | Performed by: NURSE PRACTITIONER

## 2020-03-22 PROCEDURE — 87040 BLOOD CULTURE FOR BACTERIA: CPT | Performed by: NURSE PRACTITIONER

## 2020-03-22 PROCEDURE — 36415 COLL VENOUS BLD VENIPUNCTURE: CPT | Performed by: HOSPITALIST

## 2020-03-22 PROCEDURE — 87077 CULTURE AEROBIC IDENTIFY: CPT | Performed by: NURSE PRACTITIONER

## 2020-03-22 PROCEDURE — 25800030 ZZH RX IP 258 OP 636: Performed by: NURSE PRACTITIONER

## 2020-03-22 PROCEDURE — 86850 RBC ANTIBODY SCREEN: CPT | Performed by: NURSE PRACTITIONER

## 2020-03-22 PROCEDURE — 71250 CT THORAX DX C-: CPT

## 2020-03-22 RX ORDER — METHYLPREDNISOLONE SODIUM SUCCINATE 125 MG/2ML
125 INJECTION, POWDER, LYOPHILIZED, FOR SOLUTION INTRAMUSCULAR; INTRAVENOUS ONCE
Status: COMPLETED | OUTPATIENT
Start: 2020-03-22 | End: 2020-03-22

## 2020-03-22 RX ORDER — SODIUM CHLORIDE 9 MG/ML
INJECTION, SOLUTION INTRAVENOUS CONTINUOUS
Status: DISCONTINUED | OUTPATIENT
Start: 2020-03-22 | End: 2020-03-24

## 2020-03-22 RX ORDER — GABAPENTIN 300 MG/1
CAPSULE ORAL SEE ADMIN INSTRUCTIONS
Status: ON HOLD | COMMUNITY
End: 2020-09-15

## 2020-03-22 RX ORDER — ONDANSETRON 2 MG/ML
4 INJECTION INTRAMUSCULAR; INTRAVENOUS EVERY 6 HOURS PRN
Status: DISCONTINUED | OUTPATIENT
Start: 2020-03-22 | End: 2020-04-02 | Stop reason: HOSPADM

## 2020-03-22 RX ORDER — ALBUTEROL SULFATE 90 UG/1
1-2 AEROSOL, METERED RESPIRATORY (INHALATION) EVERY 4 HOURS PRN
Status: DISCONTINUED | OUTPATIENT
Start: 2020-03-22 | End: 2020-03-24

## 2020-03-22 RX ORDER — WARFARIN SODIUM 5 MG/1
5 TABLET ORAL DAILY
Status: ON HOLD | COMMUNITY
End: 2020-04-01

## 2020-03-22 RX ORDER — GABAPENTIN 300 MG/1
300 CAPSULE ORAL EVERY MORNING
Status: ON HOLD | COMMUNITY
End: 2020-08-15

## 2020-03-22 RX ORDER — ONDANSETRON 4 MG/1
4 TABLET, ORALLY DISINTEGRATING ORAL EVERY 6 HOURS PRN
Status: DISCONTINUED | OUTPATIENT
Start: 2020-03-22 | End: 2020-04-02 | Stop reason: HOSPADM

## 2020-03-22 RX ORDER — NALOXONE HYDROCHLORIDE 0.4 MG/ML
.1-.4 INJECTION, SOLUTION INTRAMUSCULAR; INTRAVENOUS; SUBCUTANEOUS
Status: DISCONTINUED | OUTPATIENT
Start: 2020-03-22 | End: 2020-04-02 | Stop reason: HOSPADM

## 2020-03-22 RX ORDER — WARFARIN SODIUM 5 MG/1
7.5 TABLET ORAL WEEKLY
Status: ON HOLD | COMMUNITY
End: 2020-04-01

## 2020-03-22 RX ORDER — CEFEPIME HYDROCHLORIDE 1 G/1
1 INJECTION, POWDER, FOR SOLUTION INTRAMUSCULAR; INTRAVENOUS EVERY 24 HOURS
Status: DISCONTINUED | OUTPATIENT
Start: 2020-03-23 | End: 2020-03-26

## 2020-03-22 RX ORDER — ACETAMINOPHEN 650 MG/1
650 SUPPOSITORY RECTAL EVERY 4 HOURS PRN
Status: DISCONTINUED | OUTPATIENT
Start: 2020-03-22 | End: 2020-04-02 | Stop reason: HOSPADM

## 2020-03-22 RX ORDER — DIPHENHYDRAMINE HYDROCHLORIDE 50 MG/ML
25 INJECTION INTRAMUSCULAR; INTRAVENOUS ONCE
Status: COMPLETED | OUTPATIENT
Start: 2020-03-22 | End: 2020-03-22

## 2020-03-22 RX ADMIN — DIPHENHYDRAMINE HYDROCHLORIDE 25 MG: 50 INJECTION, SOLUTION INTRAMUSCULAR; INTRAVENOUS at 20:10

## 2020-03-22 RX ADMIN — METHYLPREDNISOLONE SODIUM SUCCINATE 125 MG: 125 INJECTION, POWDER, FOR SOLUTION INTRAMUSCULAR; INTRAVENOUS at 20:08

## 2020-03-22 RX ADMIN — SODIUM CHLORIDE 80 MG: 9 INJECTION, SOLUTION INTRAVENOUS at 20:12

## 2020-03-22 RX ADMIN — PHYTONADIONE 2 MG: 2 INJECTION, EMULSION INTRAMUSCULAR; INTRAVENOUS; SUBCUTANEOUS at 23:56

## 2020-03-22 RX ADMIN — SODIUM CHLORIDE: 9 INJECTION, SOLUTION INTRAVENOUS at 23:56

## 2020-03-22 RX ADMIN — VANCOMYCIN HYDROCHLORIDE 2000 MG: 5 INJECTION, POWDER, LYOPHILIZED, FOR SOLUTION INTRAVENOUS at 20:46

## 2020-03-22 RX ADMIN — CEFEPIME HYDROCHLORIDE 2 G: 2 INJECTION, POWDER, FOR SOLUTION INTRAVENOUS at 20:08

## 2020-03-22 RX ADMIN — SODIUM CHLORIDE 1000 ML: 9 INJECTION, SOLUTION INTRAVENOUS at 19:51

## 2020-03-22 RX ADMIN — SODIUM CHLORIDE 1000 ML: 9 INJECTION, SOLUTION INTRAVENOUS at 20:08

## 2020-03-22 RX ADMIN — SODIUM CHLORIDE 8 MG/HR: 9 INJECTION, SOLUTION INTRAVENOUS at 20:26

## 2020-03-22 ASSESSMENT — ENCOUNTER SYMPTOMS
DIARRHEA: 1
BLOOD IN STOOL: 1
FEVER: 0
SHORTNESS OF BREATH: 0
ABDOMINAL PAIN: 0
VOMITING: 1
COUGH: 0

## 2020-03-22 ASSESSMENT — MIFFLIN-ST. JEOR
SCORE: 1513.25
SCORE: 1497.72

## 2020-03-23 LAB
ALBUMIN SERPL-MCNC: 2.2 G/DL (ref 3.4–5)
ALP SERPL-CCNC: 55 U/L (ref 40–150)
ALT SERPL W P-5'-P-CCNC: 30 U/L (ref 0–70)
ANION GAP SERPL CALCULATED.3IONS-SCNC: 5 MMOL/L (ref 3–14)
AST SERPL W P-5'-P-CCNC: 126 U/L (ref 0–45)
BACTERIA SPEC CULT: NO GROWTH
BILIRUB SERPL-MCNC: 0.6 MG/DL (ref 0.2–1.3)
BUN SERPL-MCNC: 42 MG/DL (ref 7–30)
CALCIUM SERPL-MCNC: 7.5 MG/DL (ref 8.5–10.1)
CHLORIDE SERPL-SCNC: 111 MMOL/L (ref 94–109)
CO2 SERPL-SCNC: 23 MMOL/L (ref 20–32)
CREAT SERPL-MCNC: 2.03 MG/DL (ref 0.66–1.25)
ERYTHROCYTE [DISTWIDTH] IN BLOOD BY AUTOMATED COUNT: 14.3 % (ref 10–15)
GFR SERPL CREATININE-BSD FRML MDRD: 29 ML/MIN/{1.73_M2}
GLUCOSE BLDC GLUCOMTR-MCNC: 131 MG/DL (ref 70–99)
GLUCOSE SERPL-MCNC: 148 MG/DL (ref 70–99)
HCT VFR BLD AUTO: 36.8 % (ref 40–53)
HGB BLD-MCNC: 11.6 G/DL (ref 13.3–17.7)
INR PPP: 1.68 (ref 0.86–1.14)
LACTATE BLD-SCNC: 1.1 MMOL/L (ref 0.7–2)
Lab: NORMAL
MCH RBC QN AUTO: 27.4 PG (ref 26.5–33)
MCHC RBC AUTO-ENTMCNC: 31.5 G/DL (ref 31.5–36.5)
MCV RBC AUTO: 87 FL (ref 78–100)
MRSA DNA SPEC QL NAA+PROBE: NEGATIVE
PLATELET # BLD AUTO: 165 10E9/L (ref 150–450)
POTASSIUM SERPL-SCNC: 5 MMOL/L (ref 3.4–5.3)
PROCALCITONIN SERPL-MCNC: 84.35 NG/ML
PROT SERPL-MCNC: 5.4 G/DL (ref 6.8–8.8)
RBC # BLD AUTO: 4.24 10E12/L (ref 4.4–5.9)
SODIUM SERPL-SCNC: 139 MMOL/L (ref 133–144)
SPECIMEN SOURCE: NORMAL
SPECIMEN SOURCE: NORMAL
TROPONIN I SERPL-MCNC: 0.74 UG/L (ref 0–0.04)
WBC # BLD AUTO: 28.1 10E9/L (ref 4–11)

## 2020-03-23 PROCEDURE — 99233 SBSQ HOSP IP/OBS HIGH 50: CPT | Performed by: HOSPITALIST

## 2020-03-23 PROCEDURE — 25800030 ZZH RX IP 258 OP 636: Performed by: HOSPITALIST

## 2020-03-23 PROCEDURE — 84484 ASSAY OF TROPONIN QUANT: CPT | Performed by: HOSPITALIST

## 2020-03-23 PROCEDURE — 87640 STAPH A DNA AMP PROBE: CPT | Performed by: HOSPITALIST

## 2020-03-23 PROCEDURE — C9113 INJ PANTOPRAZOLE SODIUM, VIA: HCPCS | Performed by: HOSPITALIST

## 2020-03-23 PROCEDURE — 25000128 H RX IP 250 OP 636: Performed by: HOSPITALIST

## 2020-03-23 PROCEDURE — 83605 ASSAY OF LACTIC ACID: CPT | Performed by: HOSPITALIST

## 2020-03-23 PROCEDURE — 36415 COLL VENOUS BLD VENIPUNCTURE: CPT | Performed by: HOSPITALIST

## 2020-03-23 PROCEDURE — 87641 MR-STAPH DNA AMP PROBE: CPT | Performed by: HOSPITALIST

## 2020-03-23 PROCEDURE — 00000146 ZZHCL STATISTIC GLUCOSE BY METER IP

## 2020-03-23 PROCEDURE — 85027 COMPLETE CBC AUTOMATED: CPT | Performed by: HOSPITALIST

## 2020-03-23 PROCEDURE — 80053 COMPREHEN METABOLIC PANEL: CPT | Performed by: HOSPITALIST

## 2020-03-23 PROCEDURE — 25000125 ZZHC RX 250: Performed by: HOSPITALIST

## 2020-03-23 PROCEDURE — 25000132 ZZH RX MED GY IP 250 OP 250 PS 637: Performed by: HOSPITALIST

## 2020-03-23 PROCEDURE — 12000000 ZZH R&B MED SURG/OB

## 2020-03-23 PROCEDURE — 85610 PROTHROMBIN TIME: CPT | Performed by: HOSPITALIST

## 2020-03-23 RX ORDER — CLINDAMYCIN PHOSPHATE 600 MG/50ML
600 INJECTION, SOLUTION INTRAVENOUS EVERY 8 HOURS
Status: DISCONTINUED | OUTPATIENT
Start: 2020-03-23 | End: 2020-03-26

## 2020-03-23 RX ADMIN — CEFEPIME HYDROCHLORIDE 1 G: 1 INJECTION, POWDER, FOR SOLUTION INTRAMUSCULAR; INTRAVENOUS at 18:46

## 2020-03-23 RX ADMIN — SODIUM CHLORIDE: 9 INJECTION, SOLUTION INTRAVENOUS at 09:03

## 2020-03-23 RX ADMIN — CLINDAMYCIN PHOSPHATE 600 MG: 600 INJECTION, SOLUTION INTRAVENOUS at 20:46

## 2020-03-23 RX ADMIN — PHYTONADIONE 2 MG: 2 INJECTION, EMULSION INTRAMUSCULAR; INTRAVENOUS; SUBCUTANEOUS at 13:40

## 2020-03-23 RX ADMIN — UMECLIDINIUM 1 PUFF: 62.5 AEROSOL, POWDER ORAL at 13:39

## 2020-03-23 RX ADMIN — PANTOPRAZOLE SODIUM 40 MG: 40 INJECTION, POWDER, FOR SOLUTION INTRAVENOUS at 09:02

## 2020-03-23 RX ADMIN — CLINDAMYCIN PHOSPHATE 600 MG: 600 INJECTION, SOLUTION INTRAVENOUS at 14:15

## 2020-03-23 RX ADMIN — PANTOPRAZOLE SODIUM 40 MG: 40 INJECTION, POWDER, FOR SOLUTION INTRAVENOUS at 20:46

## 2020-03-23 ASSESSMENT — MIFFLIN-ST. JEOR: SCORE: 1513.25

## 2020-03-23 ASSESSMENT — ACTIVITIES OF DAILY LIVING (ADL)
ADLS_ACUITY_SCORE: 18
ADLS_ACUITY_SCORE: 21
ADLS_ACUITY_SCORE: 18
ADLS_ACUITY_SCORE: 18

## 2020-03-23 NOTE — H&P
[facility]    History and Physical - Hospitalist Service       Date of Admission:  3/22/2020    Assessment & Plan   Hermilo Velasquez is a 87 year old male admitted on 3/22/2020 with septic vs hypovolemic shock likely secondary to hematemesis and aspiration. He is improving thus far and will be admitted to the ICU for tonight. Gastroenterology consultation is requested in addition to intensivist given his initial lactic of 8 and SBP in the 70s. He is at high risk for deterioration and may require pressors overnight.    Hematemesis   Concern of UGIB - in setting of anticoagulation with warfarin   Patient's wife found him in bed covered in dark emesis with coffee-ground appearance and also had diarrhea.  Hemoglobin 12.8 in ED.  Patient was given 1 unit PRBC in ED.  Of note he had a upper endoscopy July 2018 with normal esophagus, a few 1 to 2 mm nonbleeding angiectasias in the stomach as well as two 2 to 3 mm angiectasias in the second portion of the duodenum.  Colonoscopy at that time also showed non-bleeding angioid ectasias and several polyps.  - typed and crossed in ED, status post 1 unit.  C  - PPI IV twice daily (Protonix drip was started in the ED -when this bag runs out can be stopped)  - Maintain large-bore IV access   - recheck hemoglobin in a.m., check sooner if emesis or other signs of bleeding occur  - [facility] consultation  - N.p.o. at midnight  - INR 1.93 on admission, will reverse with 2 mg iv VIT K since he has improved considerably and no active bleeding is obvious  - ADDENDUM - 22:22 - informed by RN of large coffee ground emesis - will place NG order and recheck hgb    Shock, likely septic vs hypovolemic - improving  Lactic acidosis - improving  Aspiration pneumonia, bilateral  COVID rule out (seems less likely)  May be due to aspiration pneumonia given CT chest findings of bilateral opacities (not felt to be congruent with Covid 19) vs GI bleed although his hgb is stable (may  be falsely high due to hemoconcentration). Initial systolic blood pressure in the 70s, stabilized with 30 mg/kg IV fluid bolus and now steady in the low 100s.  Initial lactic 8, improved to 3.8 with IVF resuscitation.  WBC 32, blood cultures and urine cultures are pending, procalcitonin pending. UA not indicative of UTI. Influenza and covid were also sent from the ED. Afebrile on presentation and denies recent F/C/SOB prior to today, though he is a poor historian.  - Cefepime and vancomycin started in ED, continued empirically for now  - Contact and droplet precaution given Covid rule out - airborne if needed per protocol or if nebs given    Acute kidney injury  Baseline creatinine appears normal, on admission creatinine 2.67.  - Status post sepsis IV fluid resuscitation, will continue maintenance fluid overnight  - Recheck metabolic panel in the morning    NSTEMI, likely type 2 - demand ischemia  Patient denies any recent chest pain, palpitations, or shortness of breath however he is a poor historian.  Per his wife he had been feeling at baseline up until this morning.  He reports compliance with his home medications.  Initial troponin 0.58, EKG without signs of overt ischemia.  -Trend troponin  -Telemetry monitoring  - Likely secondary to acute illness therefore will monitor clinically and trend enzymes -we will not consult cardiology at this point    Paroxysmal atrial fibrillation on warfarin  - HR is now nearly WNL after fluid resuscitation  - INR 1.9 on adm, hold warfarin given concern of bleeding, reverse with 2 mg IV Vit K  - PTA metoprolol to continue when BP improves with holding parameters    COPD  Stable on RA currently, other findings as above  - PTA albuterol and spiriva to continue  - prn albuterol inhaler - can switch to nebs if necessary but will then need to switch to airborne precautions due to covid rule out    Non-Hodgkins Lymphoma  - no current treatment - follow as  outpatient    Hyperlipidemia  PTA statin to continue    History of GERD  On famotidine at home, getting PPI for now    Chronic pruritus  PTA atarax held for now      Diet: NPO for Medical/Clinical Reasons Except for: No Exceptions    DVT Prophylaxis: Pneumatic Compression Devices  Suazo Catheter: in place, indication:    Code Status: Full Code    Disposition Plan   Expected discharge: 3+ days pending improvement, covid results, stabilization, GI eval/scopes  Entered: Charlie Taylor MD 03/22/2020, 9:32 PM     The patient's care was discussed with the Bedside Nurse, Patient and ED MD.    Charlie Taylor MD  [facility]    ______________________________________________________________________    Chief Complaint   Hematemesis    History is obtained from the patient and ED MD and spouse    History of Present Illness   Hermilo Velasquez is a 87 year old male with history of atrial fibrillation on Coumadin, PE/DVT, non-Hodgkin's lymphoma, COPD, chronic pruritus, and prior GI bleed who presented to the ED via EMS with concern of hematemesis and melena.  Patient is a poor historian and is unable to relay the events of today.  However his wife states that he was at his baseline until this morning when she found him in bed covered in dark emesis as well as diarrhea.  There was apparently also appear to be blood on the wall and all over the floor.  He was lethargic and unable to get out of bed.  Prior to this he seems to have been at baseline and taking his medications as directed.  From what I can gather he had not been experiencing any symptoms before this morning-no fevers, chills, shortness breath, chest pain, abdominal pain, nausea, vomiting, or diarrhea.    In the ED he was seen by Dr. Pineda and Ana M Brown CNP with whom I discussed the case.  Patient is afebrile, currently hemodynamically stable with SBP in the low 100s but initially in the 70s, mildly tachycardic in the low 100s, saturating  normally on room air.  Multiple lab abnormalities are noted-initial lactic acid 8.1 this is improved to 3.8 after IV fluid resuscitation.  Is a leukocytosis of 32 and hemoglobin of 12.8.  Metabolic panel notable for creatinine of 2.67 (baseline appears to be normal) and BUN of 33.  , ALT 27, total bili 0.6.  Lipase 54.  Occult blood positive.  Initial troponin 0.580.  INR 1.93.  He was typed and crossed and given the history ER staff transfused 1 unit PRBC.  UA not indicative of infection, blood cultures urine culture, procalcitonin, influenza, and Covid testing pending.  He is at high risk for deterioration and although he has stabilized and somewhat improved will admit him to the ICU.  I have consulted gastroenterology as well as intensivist service.    Review of Systems    The 10 point Review of Systems is negative other than noted in the HPI or here.     Past Medical History    I have reviewed this patient's medical history and updated it with pertinent information if needed.   Past Medical History:   Diagnosis Date     Aortic stenosis     Severe AS, 9/2015 AVR - [facility] TRIFECTA Bovine bioprosthesis 25MM [id number]     Atrial fibrillation (H)     9/2015 Paroxysmal post op Afib - discharged on Warfarin and a beta blocker     Deep vein thrombosis (H)      GERD (gastroesophageal reflux disease)      Heart murmur      Monoclonal gammopathy     plasmacyte prominent causing monoclonal gammopathy     Need for SBE (subacute bacterial endocarditis) prophylaxis      Neuropathy      Other and unspecified hyperlipidemia      Other malignant lymphomas     non hodgkin's lymphoma     RBBB (right bundle branch block)      Severe sepsis with acute organ dysfunction (H) 11/16/2015     Unspecified hereditary and idiopathic peripheral neuropathy        Past Surgical History   I have reviewed this patient's surgical history and updated it with pertinent information if needed.  Past Surgical History:   Procedure Laterality Date      APPENDECTOMY       AS TOTAL KNEE ARTHROPLASTY       BACK SURGERY       ESOPHAGOSCOPY, GASTROSCOPY, DUODENOSCOPY (EGD), COMBINED N/A 2015    Procedure: COMBINED ESOPHAGOSCOPY, GASTROSCOPY, DUODENOSCOPY (EGD);  Surgeon: Danis Castillo MD;  Location:  GI     ESOPHAGOSCOPY, GASTROSCOPY, DUODENOSCOPY (EGD), COMBINED N/A 2018    Procedure: COMBINED ESOPHAGOSCOPY, GASTROSCOPY, DUODENOSCOPY (EGD);;  Surgeon: Toby Dong DO;  Location:  GI     HERNIA REPAIR  2006     HERNIORRHAPHY VENTRAL  2013    Procedure: HERNIORRHAPHY VENTRAL;  VENTRAL HERNIA REPAIR WITH MESH;  Surgeon: Patel Guzman MD;  Location:  OR     KNEE SURGERY      arthroscopic right knee surgery      LOBECTOMY LUNG Right 3/26/2019    Procedure: POSSIBLE LOBECTOMY LUNG;  Surgeon: Jose A Vasques MD;  Location:  OR     REPAIR LIGAMENT ANKLE  2012    Procedure:REPAIR LIGAMENT ANKLE; LEFT TARSAL TUNNEL RELEASE OF KNOT OF ARIEL RELEASE; Surgeon:SAUL PUENTE; Location: OR     REPLACE VALVE AORTIC N/A 9/3/2015    Procedure: REPLACE VALVE AORTIC;  Surgeon: Antonino Mitchell MD;  Location:  OR     ROTATOR CUFF REPAIR RT/LT      bilateral     SPINE SURGERY      3 spine surgeries     THORACOTOMY, WEDGE RESECTION LUNG, COMBINED Right 3/26/2019    Procedure: RIGHT THORACOTOMY, WEDGE RESECTION, RIGHT LOWER LOBE LUNG NODULE,;  Surgeon: Jose A Vasques MD;  Location:  OR     TONSILLECTOMY       TURP         Social History   I have reviewed this patient's social history and updated it with pertinent information if needed.  Social History     Tobacco Use     Smoking status: Former Smoker     Packs/day: 2.00     Years: 55.00     Pack years: 110.00     Last attempt to quit: 1998     Years since quittin.1     Smokeless tobacco: Never Used   Substance Use Topics     Alcohol use: Yes     Alcohol/week: 0.0 standard drinks     Comment: 1 drink per day     Drug use: No        Family History   I have reviewed this patient's family history and updated it with pertinent information if needed.   Family History   Problem Relation Age of Onset     C.A.D. Father      Emphysema Father      Melanoma No family hx of      Skin Cancer No family hx of        Prior to Admission Medications   Prior to Admission Medications   Prescriptions Last Dose Informant Patient Reported? Taking?   HYDROcodone-acetaminophen (NORCO) 5-325 MG tablet prn at prn  No Yes   Sig: Take 1 tablet by mouth every 6 hours as needed for pain   albuterol (PROAIR HFA/PROVENTIL HFA/VENTOLIN HFA) 108 (90 Base) MCG/ACT Inhaler prn at prn Self No Yes   Sig: Inhale 1-2 puffs into the lungs every 6 hours as needed for shortness of breath / dyspnea or wheezing   atorvastatin (LIPITOR) 10 MG tablet 3/21/2020 at pm  No Yes   Sig: Take 1 tablet (10 mg) by mouth daily   cephALEXin (KEFLEX) 500 MG capsule 3/21/2020 at Unknown time  No Yes   Sig: Take 1 capsule (500 mg) by mouth 2 times daily   famotidine (PEPCID) 40 MG tablet 3/21/2020 at Unknown time  No Yes   Sig: Take 1 tablet (40 mg) by mouth 2 times daily as needed (heartburn)   ferrous sulfate (FEROSUL) 325 (65 Fe) MG tablet 3/21/2020 at Unknown time Self No Yes   Sig: Take 1 tablet (325 mg) by mouth every other day   gabapentin (NEURONTIN) 300 MG capsule 3/21/2020 at am  Yes Yes   Sig: Take 300 mg by mouth every morning   gabapentin (NEURONTIN) 300 MG capsule 3/21/2020 at pm  Yes Yes   Sig: Take 900 mg by mouth At Bedtime   hydrOXYzine (ATARAX) 25 MG tablet Past Week at Unknown time  No Yes   Sig: Take 2 tablets (50 mg) by mouth every 6 hours as needed   loratadine (CLARITIN) 10 MG tablet unknown at unknown Self Yes Yes   Sig: Take 10 mg by mouth daily as needed for allergies    metoprolol succinate ER (TOPROL-XL) 100 MG 24 hr tablet 3/21/2020 at Unknown time  No Yes   Sig: Take 0.5 tablets (50 mg) by mouth daily   order for DME dme at dme  No Yes   Sig: Equipment being ordered:  "Right neoprene knee brace.  ( Fax to [facility] fax  [phone])   tiotropium (SPIRIVA HANDIHALER) 18 MCG capsule 3/21/2020 at Unknown time Self No Yes   Sig: Inhale 1 capsule (18 mcg) into the lungs daily   triamcinolone (KENALOG) 0.1 % cream prn at prn Self Yes Yes   Sig: Apply topically 2 times daily as needed for irritation   warfarin ANTICOAGULANT (COUMADIN) 5 MG tablet unknown at unknown  Yes Yes   Sig: Take 7.5 mg by mouth in the morning, Mon and Thur   warfarin ANTICOAGULANT (COUMADIN) 5 MG tablet unknown at prn  Yes Yes   Sig: Take 5 mg by mouth daily All other days      Facility-Administered Medications: None     Allergies   Allergies   Allergen Reactions     Lidocaine Blisters     Allergy to lidocaine ointment     Omeprazole Itching     Pantoprazole Itching     Prevacid [Lansoprazole] Itching     Lasix [Furosemide] Rash     Penicillins Rash     \"broke out from injection\" 60 yrs ago         Physical Exam   Vital Signs: Temp: 98.6  F (37  C) Temp src: Temporal BP: 106/60 Pulse: 104 Heart Rate: 101 Resp: 25 SpO2: 99 % O2 Device: None (Room air)    Weight: 180 lbs 0 oz    Gen: NAD, pleasant, ill and elderly  HEENT: Normocephalic, EOMI, MMM  Resp: no crackles,  no wheezes, no increased work of resp  CV: S1S2 heard, irreg irreg rhythm, mildly tachy rate, no pedal edema  Abdo: soft, nontender, nondistended, bowel sounds present  Ext: calves nontender, well perfused  Neuro: AAOx3, CN grossly intact, no facial asymmetry, able to commands    Data   Data reviewed today: I reviewed all medications, new labs and imaging results over the last 24 hours. I personally reviewed no images or EKG's today.    Recent Labs   Lab 03/22/20  1914   WBC 32.1*   HGB 12.8*   MCV 89      INR 1.93*      POTASSIUM 4.7   CHLORIDE 103   CO2 26   BUN 33*   CR 2.57*   ANIONGAP 9   AMRITA 8.4*   *   ALBUMIN 2.6*   PROTTOTAL 6.2*   BILITOTAL 0.6   ALKPHOS 80   ALT 27   *   LIPASE 54*   TROPI 0.580*     Recent " Results (from the past 24 hour(s))   CT Chest Abdomen Pelvis w/o Contrast    Narrative    CT CHEST ABDOMEN PELVIS W/O CONTRAST 3/22/2020 8:09 PM    CLINICAL HISTORY: Sepsis    TECHNIQUE: CT scan of the chest, abdomen, and pelvis was performed  without IV contrast. Multiplanar reformats were obtained. Dose  reduction techniques were used.   CONTRAST: None.    COMPARISON: CT chest 3/4/2019, 3/25/2016    FINDINGS:     Evaluation is somewhat limited due to motion artifact.    LUNGS AND PLEURA: Nonocclusive secretions in the trachea. Subsegmental  mucus impaction in the left lower lobe and lingula. There are  postsurgical changes of a peripheral right lower lobe wedge resection.  Moderate centrilobular emphysema. There are reticular, groundglass and  nodular opacities in the left upper and left lower lobe with a few  tiny groundglass and nodular opacities in the dependent right upper  lobe and right lower lobes. Small right pleural effusion. No left  pleural effusion or pneumothorax.    MEDIASTINUM/AXILLAE: Heart is normal without significant pericardial  effusion. Median sternotomy and aortic valvular repair. Coronary  artery calcifications are noted. The thoracic aorta is normal in  caliber for age with mild calcified atheromatous plaque. No axillary,  mediastinal or obvious hilar lymphadenopathy, though evaluation is  limited in the absence of intravenous contrast. There is a mildly  patulous fluid-filled esophagus.    HEPATOBILIARY: Stable subcentimeter hypodensity in the inferior right  lobe of the liver since at least 2016 and of doubtful clinical  significance. Gallbladder grossly unremarkable.    PANCREAS: Mild fatty atrophy.    SPLEEN: Normal.    ADRENAL GLANDS: Normal.    KIDNEYS/BLADDER: No hydronephrosis. Urinary bladder decompressed about  a Suazo catheter.    BOWEL: No bowel obstruction and.    LYMPH NODES: No abdominal or pelvic lymphadenopathy.    VASCULATURE: Increase in left projecting saccular  infrarenal aortic  aneurysm measuring 3.6 x 2.5 cm. Moderate calcified atheromatous  plaque of the abdominal aorta and major branch vessels.    PELVIC ORGANS: Grossly unremarkable.    OTHER: No free fluid or pneumoperitoneum.    MUSCULOSKELETAL: Osteopenia. Mild L5 compression deformity without  significant bony retropulsion. Anterior flowing thoracic osteophytes  of the thoracic spine consistent with DISH.      Impression    IMPRESSION:  1.  Bilateral reticular, groundglass and nodular opacities consistent  with multifocal infectious inflammatory pneumonitis. The distribution  is commonly seen with aspiration. Findings would be considered  uncommon for a novel Coronavirus (COVID-19).   2.  Patulous esophagus.  3.  Trace bilateral pleural effusions  4.  No acute abdominal or pelvic process.  5.  Slight increase in saccular infrarenal abdominal aortic aneurysm  measuring up to 3.6 cm.    KULWINDER SALVADOR MD

## 2020-03-23 NOTE — PHARMACY-ADMISSION MEDICATION HISTORY
Pharmacy Medication History  Admission medication history interview status for the 3/22/2020  admission is complete. See EPIC admission navigator for prior to admission medications     Medication history sources: Patient's family/friend (wife)  Medication history source reliability: Moderate  Adherence assessment: n/a    Significant changes made to the medication list:  Changed Gabapentin dose, clarified warfarin dose.      Additional medication history information:   Per MD patient is confused. Called pt's wife, she happened to be next to his medication counter and was able to provide some information. She said pt does his own medications and that he last took his meds yesterday 03/21/20. She said he started Keflex Friday for an accidental cut on his arm/hand. She recollects he took 1 capsule Friday and then took two on Saturday. She also read off a slightly different SIG for gabapentin( per wife it is 1 cap po qam and 3cap po at bedtime) per INR clinic (last 02/27/20) patient is to take 7.5mg warfarin every Thursday and 5mg on all other days. Patient to be reassessed at the clinic 4 weeks following the last visit 02/27/20. Wife is a somewhat a poor historian. She did mention they gave the med list to paramedics that they wrote down, but there are no records outside pt's room.     Medication reconciliation completed by provider prior to medication history? No    Time spent in this activity: 35min      Prior to Admission medications    Medication Sig Last Dose Taking? Auth Provider   albuterol (PROAIR HFA/PROVENTIL HFA/VENTOLIN HFA) 108 (90 Base) MCG/ACT Inhaler Inhale 1-2 puffs into the lungs every 6 hours as needed for shortness of breath / dyspnea or wheezing prn at prn Yes Karson Bishop MD   atorvastatin (LIPITOR) 10 MG tablet Take 1 tablet (10 mg) by mouth daily 3/21/2020 at pm Yes Tab Grijalva MD   cephALEXin (KEFLEX) 500 MG capsule Take 1 capsule (500 mg) by mouth 2 times daily 3/21/2020 at Unknown time  Yes Karson Bishop MD   famotidine (PEPCID) 40 MG tablet Take 1 tablet (40 mg) by mouth 2 times daily as needed (heartburn) 3/21/2020 at Unknown time Yes Karson Bishop MD   ferrous sulfate (FEROSUL) 325 (65 Fe) MG tablet Take 1 tablet (325 mg) by mouth every other day 3/21/2020 at Unknown time Yes Nelson Mao, ILYA BLEVINS   gabapentin (NEURONTIN) 300 MG capsule Take 300 mg by mouth every morning 3/21/2020 at am Yes Unknown, Entered By History   gabapentin (NEURONTIN) 300 MG capsule Take 900 mg by mouth At Bedtime 3/21/2020 at pm Yes Unknown, Entered By History   HYDROcodone-acetaminophen (NORCO) 5-325 MG tablet Take 1 tablet by mouth every 6 hours as needed for pain prn at prn Yes Karson Bishop MD   hydrOXYzine (ATARAX) 25 MG tablet Take 2 tablets (50 mg) by mouth every 6 hours as needed Past Week at Unknown time Yes Karson Bishop MD   loratadine (CLARITIN) 10 MG tablet Take 10 mg by mouth daily as needed for allergies  unknown at unknown Yes Reported, Patient   metoprolol succinate ER (TOPROL-XL) 100 MG 24 hr tablet Take 0.5 tablets (50 mg) by mouth daily 3/21/2020 at Unknown time Yes Tab Grijalva MD   order for DME Equipment being ordered: Right neoprene knee brace.  ( Fax to [facility] fax  [phone]) dme at dme Yes Karson Bishop MD   tiotropium (SPIRIVA HANDIHALER) 18 MCG capsule Inhale 1 capsule (18 mcg) into the lungs daily 3/21/2020 at Unknown time Yes Karson Bishop MD   triamcinolone (KENALOG) 0.1 % cream Apply topically 2 times daily as needed for irritation prn at prn Yes Unknown, Entered By History   warfarin ANTICOAGULANT (COUMADIN) 5 MG tablet Take 7.5 mg by mouth once weekly on Thursday unknown at unknown Yes Unknown, Entered By History   warfarin ANTICOAGULANT (COUMADIN) 5 MG tablet Take 5 mg by mouth daily All other days unknown at prn Yes Unknown, Entered By History

## 2020-03-23 NOTE — ED NOTES
Emergency Department Attending Supervision Note  3/22/2020  7:20 PM      I evaluated this patient in conjunction with Ana M Brown DNP.    Briefly, the patient is a 87-year-old male with a history of atrial fibrillation on chronic anticoagulation with Coumadin, non-Hodgkin's lymphoma, COPD, and prior GI bleeds who presents with hematemesis and melanotic stools.  Patient is a poor historian, although wife notes that he had an episode of vomiting coffee-ground appearing material this morning as well as loose diarrheal stools.  Stools did appear slightly darkened in nature.  Patient denies chest pain or shortness of breath but has significant wheezing.  He denies any fevers.    On my exam,   General: Alert, appears elderly, critically ill. Cooperative.     In mild distress  HEENT:  Head:  Atraumatic  Ears:  External ears are normal  Mouth/Throat:  Oropharynx is without erythema or exudate and mucous membranes are dry.   Eyes:   Conjunctivae normal and EOM are normal. No scleral icterus.  CV:  Tachycardic rate, regular rhythm, normal heart sounds and radial pulses are 2+ and symmetric.   Resp:  Breath sounds are coarse bilaterally with diffuse expiratory wheezing.     Mild laboring, no retractions or accessory muscle use  GI:  Abdomen is soft, no distension, no tenderness. No rebound or guarding.  No CVA tenderness bilaterally  MS:  Normal range of motion. Trace edema.    Normal strength in all 4 extremities.     Back atraumatic.  Skin:  Warm and dry.  No rash or lesions noted.  Neuro:  Alert. Normal strength.  GCS: 15  Psych:  Normal mood and affect.    ECG (19:09:56):  Rate 108 bpm. ID interval 186. QRS duration 130. QT/QTc 364/487. P-R-T axes 70 -33 68. Sinus tachycardia. Left axis deviation. Right bundle branch block. Interpreted at 1920 by Herrera Pineda MD.    Results:  Labs Ordered and Resulted from Time of ED Arrival Up to the Time of Departure from the ED   CBC WITH PLATELETS DIFFERENTIAL - Abnormal; Notable  for the following components:       Result Value    WBC 32.1 (*)     Hemoglobin 12.8 (*)     MCHC 31.0 (*)     Absolute Neutrophil 27.9 (*)     Absolute Lymphocytes 0.6 (*)     Absolute Metamyelocytes 2.2 (*)     All other components within normal limits   COMPREHENSIVE METABOLIC PANEL - Abnormal; Notable for the following components:    Glucose 147 (*)     Urea Nitrogen 33 (*)     Creatinine 2.57 (*)     GFR Estimate 21 (*)     GFR Estimate If Black 25 (*)     Calcium 8.4 (*)     Albumin 2.6 (*)     Protein Total 6.2 (*)      (*)     All other components within normal limits   LIPASE - Abnormal; Notable for the following components:    Lipase 54 (*)     All other components within normal limits   TROPONIN I - Abnormal; Notable for the following components:    Troponin I ES 0.580 (*)     All other components within normal limits   LACTIC ACID WHOLE BLOOD - Abnormal; Notable for the following components:    Lactic Acid 8.1 (*)     All other components within normal limits   INR - Abnormal; Notable for the following components:    INR 1.93 (*)     All other components within normal limits   OCCULT BLOOD STOOL - Abnormal; Notable for the following components:    Occult Blood Positive (*)     All other components within normal limits   ROUTINE UA WITH MICROSCOPIC - Abnormal; Notable for the following components:    Protein Albumin Urine 10 (*)     Mucous Urine Present (*)     All other components within normal limits   LACTIC ACID WHOLE BLOOD - Abnormal; Notable for the following components:    Lactic Acid 3.8 (*)     All other components within normal limits   PARTIAL THROMBOPLASTIN TIME   FRACTIONAL EXCRETION OF SODIUM   CARDIAC CONTINUOUS MONITORING   PERIPHERAL IV CATHETER   PERIPHERAL IV CATHETER   ABO/RH TYPE AND SCREEN   RED BLOOD CELL PREPARE ORDER UNIT   COVID-19 VIRUS (CORONAVIRUS) PCR TO [place] DEPT [facility]   INFLUENZA A/B ANTIGEN     CT Chest Abdomen Pelvis w/o Contrast   Final Result   IMPRESSION:   1.   Bilateral reticular, groundglass and nodular opacities consistent   with multifocal infectious inflammatory pneumonitis. The distribution   is commonly seen with aspiration. Findings would be considered   uncommon for a novel Coronavirus (COVID-19).    2.  Patulous esophagus.   3.  Trace bilateral pleural effusions   4.  No acute abdominal or pelvic process.   5.  Slight increase in saccular infrarenal abdominal aortic aneurysm   measuring up to 3.6 cm.      KULWINDER SALVADOR MD        My impression is:  Patient is 87-year-old male with a complex past medical history who presents with concerns for hematemesis, and melanotic stools.  Patient may have a small GI bleed component for his presentation, but much more concerning for septic shock with significant lactate elevation and leukocytosis on arrival.  I believe the patient presents with a mixed picture.      We resuscitated the patient with 2 L of IV fluids as well as 1 unit of PRBCs.  Additionally patient received Protonix bolus and a subsequent drip.  Due to prior history of itching with Protonix he was given a dose of Benadryl.  The patient does have wheezing bilaterally and CT imaging concerning for bilateral reticular ground glass and nodular opacities consistent with multifocal infectious inflammatory pneumonitis.  He was given broad-spectrum antibiotics after initial lactate returned elevated confirming septic shock.  He was given cefepime and vancomycin.  Patient's blood pressure improved significantly after IV fluid resuscitation.  We also provided a single unit of PRBCs due to the reported history from wife that he had vomited coffee-ground emesis and has had dark stools over the recent days.  Patient's labs concerning for acute renal failure with a creatinine of 2.57.  His baseline is a normal function at 0.8.  Patient also had a mild troponin elevation likely secondary to demand ischemia because there are no concerning new ischemic changes on his EKG in  comparison with prior EKGs or NSTEMI.  Did not provide heparin at this time due to concern of GI bleed confounding the above picture.  His INR is 1.93, near baseline for history of Coumadin use.  As he is more stable at this time, would not immediately reverse coumadin.  Will defer to inpatient team for further management.  Patient remains critically ill and due to the mixed picture of suspected bilateral community-acquired pneumonia and potential GI bleed and acute renal failure he will be admitted to the ICU.  We spoke with Dr. Taylor of the hospital service who agreed to admission.    Of note patient does have a COVID 19 swab in process at time of admission.     Critical Care time was 45 minutes for this patient excluding procedures.    Diagnosis    ICD-10-CM    1. Septic shock (H)  A41.9 CBC with platelets differential    R65.21 ABO/Rh type and screen     Occult blood stool (ED Lab POCT Only 3-11)     Blood culture     Blood culture     UA with Microscopic     Urine Culture     COVID-19 Virus (Coronavirus) PCR to [place] [facility] Nasopharyngeal (NP) Swab in [facility]     Influenza A & B Antigen     Fractional excretion of sodium     Blood component     Blood component     Lactic acid     Glucose by meter     Glucose by meter     Hemoglobin     Methicillin Resist/Sens S. aureus PCR     Procalcitonin     Procalcitonin     CANCELED: Procalcitonin   2. NSTEMI (non-ST elevated myocardial infarction) (H)  I21.4    3. Gastrointestinal hemorrhage, unspecified gastrointestinal hemorrhage type  K92.2    4. Acute renal failure, unspecified acute renal failure type (H)  N17.9          Edwin Silverman MD      Scribe Disclosure:  Amira MONK, am serving as a scribe at 7:20 PM on 3/22/2020 to document services personally performed by Herrera Pineda MD, based on my observations and the provider's statements to me.         Herrera Pineda MD  03/23/20 0106

## 2020-03-23 NOTE — PLAN OF CARE
Pt arrived in ICU from ED @ 2215. A&Ox4, slightly [place]. Upper dentures reportedly at home, hard to understand at times. -115's. BP stable overnight. Pt remained on 5L oxymask, LS diminished. Denies SOB, but is LAROSE. 300mL black emesis x1. NG placed LIS, 500mL black output. NPO. Glucose 117-148. Suazo patent with adequate UOP. Plan for GI consult.

## 2020-03-23 NOTE — PHARMACY-VANCOMYCIN DOSING SERVICE
Pharmacy Vancomycin Initial Note  Date of Service 2020  Patient's  11/3/1932  87 year old, male    Indication: Pneumonia    Current estimated CrCl = Estimated Creatinine Clearance: 23.8 mL/min (A) (based on SCr of 2.57 mg/dL (H)).    Creatinine for last 3 days  3/22/2020:  7:14 PM Creatinine 2.57 mg/dL    Recent Vancomycin Level(s) for last 3 days  No results found for requested labs within last 72 hours.      Vancomycin IV Administrations (past 72 hours)                   vancomycin 2000 mg in 0.9% NaCl 500 ml intermittent infusion 2,000 mg (mg) 2,000 mg Given 20                Nephrotoxins and other renal medications (From now, onward)    None          Contrast Orders - past 72 hours (72h ago, onward)    None                Plan:  1.  Received vancomycin  2000 mg IV once in ED - continue intermittent dosing per levels.   2.  Goal Trough Level: 15-20 mg/L   3.  Pharmacy will check trough levels as appropriate in 24 hr.    4. Serum creatinine levels will be ordered daily for the first week of therapy and at least twice weekly for subsequent weeks.    5. Rising Star method utilized to dose vancomycin therapy: Method 2    Rabia Crespo, [facility]

## 2020-03-23 NOTE — ED NOTES
Bed: ED25  Expected date:   Expected time:   Means of arrival:   Comments:  [facility] [place] [facility] [place] 87 [address]

## 2020-03-23 NOTE — CONSULTS
GASTROENTEROLOGY CONSULTATION     Hermilo Velasquez  [address] [facility] [id number]  87 year old male    Admission Date/Time: 3/22/2020  Primary Care Provider: Karson Bishop  Referring / Attending Physician:  Tesha    We were asked to see the patient in consultation by Dr. Rodney  for evaluation of Hematemesis.  He is also being evaluated for pneumonia COVID vs aspiration vs community acquired.          PAST MEDICAL HISTORY:  Patient Active Problem List    Diagnosis Date Noted     Hematemesis 03/22/2020     Priority: Medium     Malignant neoplasm of lung, unspecified laterality, unspecified part of lung (H) 01/13/2020     Priority: Medium     Acute respiratory failure with hypoxia (H) 01/13/2020     Priority: Medium     CAP (community acquired pneumonia) 12/22/2019     Priority: Medium     Rotator cuff tendinitis, left 12/17/2019     Priority: Medium     S/P rotator cuff surgery 12/17/2019     Priority: Medium     Nodule of lower lobe of right lung 03/26/2019     Priority: Medium     Spongiotic dermatitis 03/01/2019     Priority: Medium     Iron deficiency anemia due to chronic blood loss 08/30/2018     Priority: Medium     Chronic obstructive pulmonary disease, unspecified COPD type (H) 03/26/2018     Priority: Medium     Hyperlipidemia LDL goal <130 01/10/2018     Priority: Medium     ACP (advance care planning) 10/24/2016     Priority: Medium     Advance Care Planning 10/24/2016: Receipt of ACP document:  Received: Resuscitation Guidelines order which was signed and dated by provider on 8-22-16.  Document previously scanned on 8-22-16.  Order reviewed and found to be valid.  Code Status reflects choices in most recent ACP document.  Confirmed/documented designated decision maker(s).  Added by Heather Reyes RN Advance Care Planning Liaison with [employer]             Lower extremity edema 10/07/2016     Priority: Medium     Leg edema, left 10/07/2016     Priority: Medium     S/P total  knee arthroplasty 10/07/2016     Priority: Medium     Drainage from wound: left Knee 08/30/2016     Priority: Medium     Aftercare following left knee joint replacement surgery 08/22/2016     Priority: Medium     Primary osteoarthritis of left knee 08/19/2016     Priority: Medium     Essential hypertension with goal blood pressure less than 140/90 08/19/2016     Priority: Medium     Non-Hodgkin's lymphoma, unspecified body region, unspecified non-Hodgkin lymphoma type (H) 08/19/2016     Priority: Medium     Anticoagulated on Coumadin 08/19/2016     Priority: Medium     Other chronic pulmonary embolism without acute cor pulmonale (H) 08/19/2016     Priority: Medium     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 08/19/2016     Priority: Medium     Benign neoplasm of colon 06/14/2016     Priority: Medium     Benign neoplasm of colon, unspecified part of colon 06/10/2016     Priority: Medium     Pulmonary nodules 06/10/2016     Priority: Medium     Long-term (current) use of anticoagulants [Z79.01] 02/24/2016     Priority: Medium     S/P IVC filter 02/16/2016     Priority: Medium     Removed 10/2016       Pulmonary embolism, bilateral (H) 02/10/2016     Priority: Medium     Primary osteoarthritis of both knees 01/12/2016     Priority: Medium     Primary osteoarthritis of left hip 01/12/2016     Priority: Medium     Gastrointestinal hemorrhage with melena 11/27/2015     Priority: Medium     Need for SBE (subacute bacterial endocarditis) prophylaxis      Priority: Medium     RBBB (right bundle branch block)      Priority: Medium     Physical deconditioning 09/14/2015     Priority: Medium     Paroxysmal atrial fibrillation (H) 09/14/2015     Priority: Medium     Post-op 2015       Anemia due to blood loss, acute 09/11/2015     Priority: Medium     Neuropathy 07/06/2015     Priority: Medium     History of colonic polyps 10/17/2014     Priority: Medium     CARDIOVASCULAR SCREENING; LDL GOAL LESS THAN 130 05/17/2014      "Priority: Medium     Health Care Home 2013     Priority: Medium     Tiana Beatty BS, RN, PHN  \[facility]\"" Care Coordinator  [phone]                        Microscopic hematuria 2013     Priority: Medium     C[facility] 2013       Non Hodgkin's lymphoma (H): 2013 06/10/2013     Priority: Medium     Dr. [facility]       Nonrheumatic aortic valve stenosis      Priority: Medium      2015 AVR - [facility] TRIFECTA Bovine bioprosthesis 25MM TF-25A         GERD (gastroesophageal reflux disease)      Priority: Medium     Ventral hernia 2013     Priority: Medium     HPI:  Hermilo Velasquez is a 87 year old male whose  wife found him in bed covered with coffee ground emesis and diarrhea . He was found in the ER to have hypovolemia and pneumonia.    Patient has had a longstanding iron deficiency anemia with last EGD and colonoscopy in  significant for AVM's . Normal hemoglobin runs in the 11-12 range. He had been on a PPI for reflux symptoms .  In late  he was transitioned to a H2 blocker.  PMH is also significant for a new diagnosis of malung malignancy with lung resection in 2020    ROS: A ten point review of systems was obtained by review of chart and negative other than the symptoms noted above in the HPI.      MEDICATIONS:   No current outpatient medications on file.       ALLERGIES:   Allergies   Allergen Reactions     Lidocaine Blisters     Allergy to lidocaine ointment     Omeprazole Itching     Pantoprazole Itching     Prevacid [Lansoprazole] Itching     Lasix [Furosemide] Rash     Penicillins Rash     \"broke out from injection\" 60 yrs ago         SOCIAL HISTORY:  Social History     Tobacco Use     Smoking status: Former Smoker     Packs/day: 2.00     Years: 55.00     Pack years: 110.00     Last attempt to quit: 1998     Years since quittin.1     Smokeless tobacco: Never Used   Substance Use Topics     Alcohol use: Yes     Alcohol/week: 0.0 standard " "drinks     Comment: 1 drink per day     Drug use: No       FAMILY HISTORY:  Family History   Problem Relation Age of Onset     C.A.D. Father      Emphysema Father      Melanoma No family hx of      Skin Cancer No family hx of        PHYSICAL EXAM: No physical contact with patient because he is being evaluated for COVID 19  General  WN gentleman   /66   Pulse 114   Temp 98.5  F (36.9  C) (Oral)   Resp 14   Ht 1.778 m (5' 10\")   Wt 83.2 kg (183 lb 6.8 oz)   SpO2 (!) 89%   BMI 26.32 kg/m      PHYSICAL EXAM:  Per primary care exam      ADDITIONAL COMMENTS:   I reviewed the patient's new clinical lab test results.   Recent Labs   Lab Test 03/23/20 0459 03/22/20 2328 03/22/20 1914 02/27/20 12/25/19  0757   WBC 28.1*  --  32.1*  --   --  11.1*   HGB 11.6* 12.3* 12.8*  --   --  11.1*   MCV 87  --  89  --   --  90     --  214  --   --  174   INR 1.68*  --  1.93* 2.0*   < > 2.24*    < > = values in this interval not displayed.     Recent Labs   Lab Test 03/23/20 0459 03/22/20 1914 12/25/19  0757   POTASSIUM 5.0 4.7 4.3   CHLORIDE 111* 103 104   CO2 23 26 33*   BUN 42* 33* 14   ANIONGAP 5 9 1*     Recent Labs   Lab Test 03/23/20 0459 03/22/20  1950 03/22/20 1914 12/22/19  1614 12/22/19  1342  09/03/15  0550   ALBUMIN 2.2*  --  2.6*  --  2.8*   < >  --    BILITOTAL 0.6  --  0.6  --  0.7   < >  --    ALT 30  --  27  --  11   < >  --    *  --  101*  --  11   < >  --    PROTEIN  --  10*  --  10*  --   --  Negative   LIPASE  --   --  54*  --   --   --   --     < > = values in this interval not displayed.       I reviewed the patient's new imaging results.        CONSULTATION ASSESSMENT AND PLAN:    Active Problems:    Hematemesis    Assessment/   The differential diagnosis of this patient's hematemesis has to include AVM, shon wei tear , acid peptic disease and  metastatic small call cancer of the lung.   His hemoglobin is not really changed from baseline and he is not having any further " hematemesis nor black stools.    Plan/  Okay to wait on EGD until pneumonia better and COVID 19 results return.  Continue PPI IV. Clear liquids  No need to further correct INR would observe for now   Will continue to follow      Hina Page MD  [facility]  Office

## 2020-03-23 NOTE — ED NOTES
DATE:  3/22/2020   TIME OF RECEIPT FROM LAB:  7:27 PM  LAB TEST:  Lactate: 8.1  LAB VALUE:  See above  RESULTS GIVEN WITH READ-BACK TO (PROVIDER):  Ana M Brown CNP  TIME LAB VALUE REPORTED TO PROVIDER:   7:27 PM

## 2020-03-23 NOTE — PROGRESS NOTES
Pt oriented x3-4, but is forgetful about situation. [place], needs glasses and speech is garbled without dentures.   Tele ST. -130s. AFebrile, denies pain, SOB, dizziness.   4L NC. Coarse LS.   NG removed, no output this shift. No stools, advanced diet. Tolerated jello, water and pudding with no nausea/vomiting.   Heavy assist x1, or assist x2. Some baseline tremors noted.   Wife updated via telephone.     Sent to 66 with belongings (clothes/glasses/dentures)

## 2020-03-23 NOTE — PROVIDER NOTIFICATION
"MD Notification    Notified Person: MD    Notified Person Name: Dr Rodney    Notification Date/Time: 3/23/2020 1728    Notification Interaction: Sent text message    Purpose of Notification: Requested \"suave\" for back itching    Orders Received:    Comments:  "

## 2020-03-23 NOTE — PROGRESS NOTES
[facility]    Medicine Progress Note - Hospitalist Service       Date of Admission:  3/22/2020  Assessment & Plan     Hermilo Velasquez is a 87 year old male admitted to the intensive care unit on 3/22/2020 with septic vs hypovolemic shock likely secondary to hematemesis and aspiration pneumonia.     Hypovolemic shock   Lactic acidosis   Resolved with intravenous fluid and packed red blood cells    Hematemesis due to acute upper gastrointestinal hemorrhage in the setting of anticoagulation with warfarin   Acute blood loss anemia   History of gastroesophageal reflux disease, gastric and duodenal angiectasias   Patient's wife found him in bed covered in dark emesis with coffee-ground appearance and also had diarrhea.  Hemoglobin 12.8 in ED.  Patient was given 1 unit PRBC in ED.  He also received vitamin K and intravenous proton pump inhibitor.  Of note he had a upper endoscopy July 2018 with normal esophagus, a few 1 to 2 mm nonbleeding angiectasias in the stomach as well as two 2 to 3 mm angiectasias in the second portion of the duodenum.  Colonoscopy at that time also showed non-bleeding angioid ectasias and several polyps.  Hemoglobin is stable and there is no blood coming from the nasogastric tube or per rectum.    Discussed with [facility].    Remove nasogastric tube     Clear liquids     Continue intravenous proton pump inhibitor bid     Monitor hemoglobin     Intravenous vitamin K again today for elevated INR    Deferring esophagogastroduodenoscopy until stable from a respiratory point of view so long as he does not re-bleed.    Likely aspiration pneumonia, bilateral, rule out COVID-19  Acute respiratory failure, hypoxic   Chronic obstructive lung disease   Respiratory status is stable. Pro-calcitonin was 85.    Wean oxygen as able     Continue cefepime and add clindamycin for anaerobic coverage (PCN allergy)    Follow up COVID-19 PCR  Continue isolation    Inhaled bronchodilators as needed;  continue umeclidinium    Acute kidney injury with probable stage 2 chronic kidney disease   Creatinine   Date Value Ref Range Status   03/23/2020 2.03 (H) 0.66 - 1.25 mg/dL Final     Continue Suazo     Monitor urine output, renal function     NSTEMI, likely type 2 - demand ischemia  Hypertension on metoprolol succinate  Patient denies any recent chest pain, palpitations, or shortness of breath however he is a poor historian.  Per his wife he had been feeling at baseline up until this morning.  He reports compliance with his home medications.  Initial troponin 0.58, EKG without signs of overt ischemia.    Continue telemetry monitoring    Transthoracic echocardiogram when out of isolation     Resume metoprolol with the short-acting formulation for now     Paroxysmal atrial fibrillation on warfarin  INR   Date Value Ref Range Status   03/23/2020 1.68 (H) 0.86 - 1.14 Final     Continue to hold warfarin     Intravenous vitamin K today    Hyperlipidemia  Continue PTA statin     Chronic pruritus    PTA Atarax held for now    History of lung cancer and NHL in remission     Diet: Clear Liquid Diet    DVT Prophylaxis: Pneumatic Compression Devices  Suazo Catheter: in place, indication: Strict 1-2 Hour I&O  Code Status: Full Code      Disposition Plan    Transfer out of the intensive care unit.  Expected discharge: 3-5 days to home versus transitional care unit when hemoglobin stable and respiratory status has improved.  Entered: Bennie Rodney MD 03/23/2020, 3:02 PM       The patient's care was discussed with the Patient and nurse     Bennie Rodney MD  Hospitalist Service  [facility]    ______________________________________________________________________    Interval History       Data reviewed today: I reviewed all medications, new labs and imaging results over the last 24 hours. I personally reviewed no images or EKG's today.    Physical Exam   Vital Signs: Temp: 98.4  F (36.9  C) Temp  src: Oral BP: 135/69 Pulse: 108 Heart Rate: 107 Resp: 30 SpO2: 92 % O2 Device: Nasal cannula Oxygen Delivery: 4 LPM  Weight: 183 lbs 6.76 oz  Constitutional: awake, alert, cooperative, no apparent distress  ENT: Normocephalic, without obvious abnormality, atraumatic; + nasogastric tube   Respiratory: No increased work of breathing, good air exchange, clear to auscultation bilaterally, no crackles or wheezing  Cardiovascular: tachycardic, normal S1 and S2, no S3 or S4, and no murmur noted  GI: normal bowel sounds, soft, non-distended, non-tender, no masses palpated  Skin: no rashes and no jaundice  Neuropsychiatric: General: normal, calm and normal eye contact    Data   Recent Labs   Lab 03/23/20  0459 03/22/20  2328 03/22/20  1914   WBC 28.1*  --  32.1*   HGB 11.6* 12.3* 12.8*   MCV 87  --  89     --  214   INR 1.68*  --  1.93*     --  138   POTASSIUM 5.0  --  4.7   CHLORIDE 111*  --  103   CO2 23  --  26   BUN 42*  --  33*   CR 2.03*  --  2.57*   ANIONGAP 5  --  9   AMRITA 7.5*  --  8.4*   *  --  147*   ALBUMIN 2.2*  --  2.6*   PROTTOTAL 5.4*  --  6.2*   BILITOTAL 0.6  --  0.6   ALKPHOS 55  --  80   ALT 30  --  27   *  --  101*   LIPASE  --   --  54*   TROPI 0.738*  --  0.580*     Recent Results (from the past 24 hour(s))   CT Chest Abdomen Pelvis w/o Contrast    Narrative    CT CHEST ABDOMEN PELVIS W/O CONTRAST 3/22/2020 8:09 PM    CLINICAL HISTORY: Sepsis    TECHNIQUE: CT scan of the chest, abdomen, and pelvis was performed  without IV contrast. Multiplanar reformats were obtained. Dose  reduction techniques were used.   CONTRAST: None.    COMPARISON: CT chest 3/4/2019, 3/25/2016    FINDINGS:     Evaluation is somewhat limited due to motion artifact.    LUNGS AND PLEURA: Nonocclusive secretions in the trachea. Subsegmental  mucus impaction in the left lower lobe and lingula. There are  postsurgical changes of a peripheral right lower lobe wedge resection.  Moderate centrilobular emphysema.  There are reticular, groundglass and  nodular opacities in the left upper and left lower lobe with a few  tiny groundglass and nodular opacities in the dependent right upper  lobe and right lower lobes. Small right pleural effusion. No left  pleural effusion or pneumothorax.    MEDIASTINUM/AXILLAE: Heart is normal without significant pericardial  effusion. Median sternotomy and aortic valvular repair. Coronary  artery calcifications are noted. The thoracic aorta is normal in  caliber for age with mild calcified atheromatous plaque. No axillary,  mediastinal or obvious hilar lymphadenopathy, though evaluation is  limited in the absence of intravenous contrast. There is a mildly  patulous fluid-filled esophagus.    HEPATOBILIARY: Stable subcentimeter hypodensity in the inferior right  lobe of the liver since at least 2016 and of doubtful clinical  significance. Gallbladder grossly unremarkable.    PANCREAS: Mild fatty atrophy.    SPLEEN: Normal.    ADRENAL GLANDS: Normal.    KIDNEYS/BLADDER: No hydronephrosis. Urinary bladder decompressed about  a Suazo catheter.    BOWEL: No bowel obstruction and.    LYMPH NODES: No abdominal or pelvic lymphadenopathy.    VASCULATURE: Increase in left projecting saccular infrarenal aortic  aneurysm measuring 3.6 x 2.5 cm. Moderate calcified atheromatous  plaque of the abdominal aorta and major branch vessels.    PELVIC ORGANS: Grossly unremarkable.    OTHER: No free fluid or pneumoperitoneum.    MUSCULOSKELETAL: Osteopenia. Mild L5 compression deformity without  significant bony retropulsion. Anterior flowing thoracic osteophytes  of the thoracic spine consistent with DISH.      Impression    IMPRESSION:  1.  Bilateral reticular, groundglass and nodular opacities consistent  with multifocal infectious inflammatory pneumonitis. The distribution  is commonly seen with aspiration. Findings would be considered  uncommon for a novel Coronavirus (COVID-19).   2.  Patulous esophagus.  3.   Trace bilateral pleural effusions  4.  No acute abdominal or pelvic process.  5.  Slight increase in saccular infrarenal abdominal aortic aneurysm  measuring up to 3.6 cm.    KULWINDER SALVADOR MD     Medications     sodium chloride 75 mL/hr at 03/23/20 1226       ceFEPIme (MAXIPIME) IV  1 g Intravenous Q24H     clindamycin  600 mg Intravenous Q8H     pantoprazole (PROTONIX) IV  40 mg Intravenous BID     umeclidinium  1 puff Inhalation Daily

## 2020-03-23 NOTE — PROVIDER NOTIFICATION
Paged Dr Mitchell (hospitalist) for critical lab: Procal 84.35    No new orders, continue to monitor.

## 2020-03-23 NOTE — ED PROVIDER NOTES
"  History     Chief Complaint:  Rectal Bleeding      HPI   Hermilo Velasquez is a 87 year old male, currently on Coumadin, with a history of Non Hodgkin's lymphoma, COPD, and GI bleed who presents with rectal bleeding. The patient is a somewhat poor historian, which limits history. He reports that prior to arrival today, he had an episode of coffee-ground emesis as well as diarrhea. This prompted his arrival in the ED. The patient reports a history of these symptoms previously, most recently two months ago. He denies fever, abdominal pain, SOB, cough, or fever. The patient denies any recent NSAID use.     Further history was obtained from the wife via telephone. She reported that the patient was doing \"ok\" until this morning, when he couldn't get out of bed. She reports finding him in bed with a large amount of dark emesis covering him. This was also in the bed and had shot onto the bedroom wall She reported seeing diarrhea in the bed as well but reports that this was not black/bloody.     Of note, review of the patient's chart shows that his INR was last checked on 2/27/2020 and was found to be 2.0. He had a cardiac ECHO completed on 7/23/2018, and his EF was 50-55% at this time. Additional review shows he has undergone an upper endoscopy and colonoscopy previously, results as below.     Upper GI Endoscopy, 7/23/0218:  Findings: The esophagus was normal. A few 1 to 2 mm no bleeding angioectasias were found in the stomach. Two 2 to 3 mm angioectasias without bleeding were found in the second portion of the duodenum.     Colonoscopy 7/23/2018:  Impression: Three non-bleeding colonic angioectasias. Five 4 to 5 mm polyps in the transverse colon, removed with a cold snare. Resected and retrieved. Two 5 to 8 mm polyps in the sigmoid colon. The examination was otherwise normal.     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   -Due to " peripheral neuropathy, he received single agent Rituximab given weekly for four doses every six months. Completed in June 2012.   -A Chest XR today, 1/15/2020, revealed a small right pleural effusion without malignancy.   -We will plan serial observation with a chest CT every six months for three years, then annually.       Allergies:  Lidocaine  Omeprazole  Pantoprazole  Prevacid   Lasix   Penicillins    Medications:    Albuterol  Lipitor  Keflex  Iron  Pepcid  Gabapentin  Norco  Hydroxyzine  Claritin   Metoprolol  Spiriva  Coumadin     Past Medical History:    Aortic stenosis   Atrial fibrillation   DVT  GERD  Heart murmur  Monoclonal gammopathy   Neuropathy   Hyperlipidemia  Right bundle branch block  Severe sepsis   Ventral hernia   Non rheumatic aortic valve stenosis   Non Hodgkin's lymphoma   Microscopic hematuria   GI hemorrhage with melena  Osteoarthritis  PE  Pulmonary nodules   Benign neoplasm of colon   Lower extremity edema   COPD   Nodule of lower lobe of right lung   Rotator cuff tendinitis, left  Spongiotic dermatitis   Iron deficiency anemia     Past Surgical History:    Appendectomy   Total Knee Arthroplasty   EGD  Hernia Repair  Right knee arthroscopy   Lung lobectomy   Ankle surgery   Aortic valve replacement  Rotator cuff repair, bilateral  Spine surgery   Thoracotomy   Tonsillectomy   TURP    Family History:    The patient reports a paternal history of CAD and emphysema. The patient denies any other relevant family history.     Social History:  The patient is  and lives at home with his wife. He reports former tobacco use, 2.00 packs per day, and alcohol use of one drink per day. He denies drug use.     Review of Systems   Constitutional: Negative for fever.   Respiratory: Negative for cough and shortness of breath.    Gastrointestinal: Positive for blood in stool, diarrhea and vomiting. Negative for abdominal pain.   All other systems reviewed and are negative.    Physical Exam  "  Vitals:  Patient Vitals for the past 24 hrs:   BP Temp Temp src Pulse Heart Rate Resp SpO2 Height Weight   03/22/20 2031 107/57 -- -- 105 104 27 100 % -- --   03/22/20 2030 108/53 -- -- 104 102 24 100 % -- --   03/22/20 2027 97/56 98.6  F (37  C) -- 105 105 12 100 % -- --   03/22/20 2025 -- -- -- -- 104 (!) 35 100 % -- --   03/22/20 2020 -- -- -- 108 108 21 99 % -- --   03/22/20 1915 (!) 73/44 98.8  F (37.1  C) Temporal 105 105 -- 91 % 1.778 m (5' 10\") 81.6 kg (180 lb)        Physical Exam  Nursing notes reviewed. Vitals reviewed.  General: Appears critically ill.  Follows commands. Black dry body fluid noted on beard.   Eyes:  Conjunctiva non-injected, non-icteric.  Neck/Throat: dry mucous membranes  Normal voice.  Cardiac: tachycardic, regular rate. 2+ radial pulses. Trace lower extremity edema.   Pulmonary: bilateral expiratory wheezing course breath sounds.  Abdomen: Soft. Non-distended. Non-tender to palpation. No masses. No guarding or rebound.  Musculoskeletal: Normal gross range of motion of all 4 extremities.    Neurological: Alert and oriented to person only.  Skin: Warm and dry without purpura or petechiae.  Psych: answers questions.  Good eye contact.     Emergency Department Course   ECG:  Indication: Rectal Bleeding  Findings: sinus tachycardia. Left axis deviation. Right bundle branch block. ECG read at 7:20PM by Dr. Pineda.  Ventricular rate: 108 bpm  AL Interval: 186 ms  QRS duration: 130 ms  QT/Qtc:364/487 ms   R-wave axis: -33     Imaging:  Radiographic findings were communicated with the patient who voiced understanding of the findings.    CT Abdomen Pelvis w/ contrast: IMPRESSION: 1. Bilateral reticular, groundglass and nodular opacities consistent with multifocal infectious inflammatory pneumonitis. The distribution  is commonly seen with aspiration. Findings would be considered  uncommon for a novel Coronavirus (COVID-19).  2.  Patulous esophagus. 3.  Trace bilateral pleural effusions 4.  No " acute abdominal or pelvic process. 5.  Slight increase in saccular infrarenal abdominal aortic aneurysm measuring up to 3.6 cm. Report per radiology.     Laboratory:  CBC: WBC 32.1 (high), HGB 12.8 (low) o/w WNL. ()   CMP: Glucose 147 (high), BUN 33 (low), Creatinine 2.57 (high), GFR 21 (low), Calcium 8.4 (low), Albumin 2.6 (low), Protein Total 6.2 (low),  (high) o/w WNL   INR: 1.93 (high)  PTT: 30  Lipase: 54 (low)  Troponin I (7:14 PM): 0.580 (high)  Lactic Acid (7:14 PM): 8.1 (high)  Lactic Acid (8:55 PM): 3.8 (high)  Influenza A/B Antigen: Negative  Occult blood, stool: Positive  UA: Clear, yellow urine. Protein Albumin 10. Mucous present, o/w WNL.   Fractional Excretion of Sodium: WNL    Urine Culture: Pending  COVID 19 PCR: Pending   Blood Cultures: Pending x2    Interventions:  Normal Saline 1L IV injection x2  Benadryl 25 mg IV injection   Vancomycin 2000 mg IV injection   Protonix 80 mg IV injection   Protonix 80 mg IV infusion   Cefepime 2 g IV injection   Solu-Medrol 125 mg IV injection   Red Blood Cells, 1 Unit     Emergency Department Course:  Nursing notes and vitals reviewed. I performed an exam of the patient as documented above.   IV inserted and blood drawn. The patient was placed on continuous cardiac monitoring and pulse oximetry.  7:13 PM Discussed the case with Dr. Pineda, who agrees to staff the patient in the ED.   7:51 PM The patient was given NLS IV, dosage as noted above.    8:08 PM The patient was given additional NLS IV, Cefepime IV, and Solu-Medrol IV, dosages as noted above.    8:09 PM Placed a call to the patient's spouse, Roberta. Discussed patient's condition. Additional history obtained. She notes he was feeling normal at bedtime but did not get up this morning and when she went to check on him he had dark coffee ground emesis all over his face and clothing and on the wall opposite his bed.  She notes he was also incontinent of dark diarrhea.  She states he was unable to  get out of bed due to generalized weakness.   8:10 PM The patient was given Benadryl IV, dosage as noted above.    8:12 PM Patient was given a Protonix IV injection, dosage as above, and was subsequently started on a Protonix IV infusion.  8:19 PM Patient received one unit of blood in the ED.    8:23 PM Discussed the case with Dr. Taylor, on call for hospitalist services.   8:25 PM CT scan obtained.  Results as above.    8:46 PM The patient was given Vancomycin IV, dosage as noted above.    Findings and plan explained to the Patient and spouse via telephone who consents to admission. Discussed the patient with Dr. Taylor, who will admit the patient for further monitoring, evaluation, and treatment.     Impression & Plan    CMS Diagnoses:   The patient has signs of Septic Shock The patient has signs of septic shock as evidenced by:  1. Presence of Sepsis, AND  2. Lactic Acid level greater than or equal to 4    Time septic shock diagnosis confirmed = 7:14 PM  03/22/20 as this was the time when Lactate was resulted and the level was greater than or equal to 4    3 Hour Septic Shock Bundle Completion:  1. Initial Lactic Acid Result:   Recent Labs   Lab Test 03/22/20 2055 03/22/20 1914 12/22/19  1339   LACT 3.8* 8.1* 0.8     2. Blood Cultures before Antibiotics: Yes  3. Broad Spectrum Antibiotics Administered:  Yes       Anti-infectives (From admission through now)    Start     Dose/Rate Route Frequency Ordered Stop    03/22/20 2013  vancomycin 2000 mg in 0.9% NaCl 500 ml intermittent infusion 2,000 mg      2,000 mg  over 90 Minutes Intravenous ONCE 03/22/20 2012 03/22/20 2156 03/1935  ceFEPIme (MAXIPIME) 2 g vial to attach to  ml bag for ADULTS or 50 ml bag for PEDS      2 g  over 30 Minutes Intravenous ONCE 03/22/20 1934 03/22/20 2038          4. IF patient is in septic shock within 6 hours of time zero, as defined by:  -Initial Hypotension:  2 low BP readings (SBP <90, MAP <65, or decrease > 40 from  baseline due to infection) within 3 hrs of each other during the time period of 6hrs before and 6 hrs  after time zero  -Lactate > or = 4  THEN: Full 30 mL/kg bolus given (see amount below).    BMI Readings from Last 1 Encounters:   03/22/20 26.32 kg/m      30 mL/kg fluids based on weight: 2,450 mL  30 mL/kg fluids based on IBW (must be >= 60 inches tall): 2,190 mL     2055: repeat lactate: 3.8  2055: I performed a repeat perfusion exam and the patient was improved as demonstrated by 107/57 and decreased confusion.    Medical Decision Making:    Hermilo Velasquez is a 87 year old male with a history significant for Non Hodgkin's lymphoma currently in remission, aortic bovine prosthesis in 2015, paroxysmal atrial fibrillation, anticoagulated on Warfarin, gastrointestinal hemorrhage, pulmonary embolism, COPD, and recent hospitalization in 12/2019 for acute hypoxic respiratory failure secondary to community-acquired pneumonia. On exam, the patient is oriented to person but otherwise has confusion to place and situation. He does answer questions appropriately and can follow commands. He has no focal weakness of any extremities. He was noted to be initially hypotensive with a blood pressure of 73/44 mmHg. IV fluids were immediately initiated, and blood was administered. On auscultation, he has bilateral expiratory wheezing. Oxygen saturations are 91% on room air and went up to 99% two liters nasal cannula. Initial WBC returns at 32.1 with a lactate 8.1. The patient was immediately given a second liter of IV fluids and was started on Cefepime, Vancomycin, and Solu-Medrol. With initial IV fluids, his blood pressure did improve to 97/56 mmHg. Hemoglobin is stable at 12.8 with an INR of 1.93. His occult is positive. He has had no stools or hematemesis while in the ED. He also has an acute kidney injury with a creatinine of 2.57, up from a baseline of 0.82. ECG shows sinus tachycardia with a rate of 108 bpm and a right  bundle branch block. Troponin does return elevated at 0.580, likely secondary due to demand ischemia. I spoke with the patient's wife regarding his condition and discussed that he would be admitted. She is in agreement with this plan. CT returned showing ground gross and nodular opacities consistent with inflammatory infectious pneumonitis. This is consistent with aspiration. Findings are uncommon for coronavirus. COVID 19 and influenza testing was sent, and influenza was negative. He does have a slight increase in the saccular AAA, measuring up to 3.6 cm. I spoke with Dr. Taylor, hospitalist, who will admit the patient to the ICU. At this time, with no active bleeding, we will not reverse his anticoagulation. He was started on a Protonix bolus and drip.     Diagnosis:    ICD-10-CM   1. Septic shock (H)  A41.9    R65.21   2. NSTEMI (non-ST elevated myocardial infarction) (H)  I21.4   3. Acute kidney injury (H)  N17.9       Disposition:  Admitted to Dr. Taylor    I, Aurelia Thompson, am serving as a scribe at 7:05 PM on 3/22/2020 to document services personally performed by Ana M Brown CNP, based on my observations and the provider's statements to me.         Ana M Brown CNP  03/22/20 7014

## 2020-03-24 ENCOUNTER — APPOINTMENT (OUTPATIENT)
Dept: CARDIOLOGY | Facility: CLINIC | Age: 85
DRG: 871 | End: 2020-03-24
Attending: HOSPITALIST
Payer: COMMERCIAL

## 2020-03-24 LAB
ANION GAP SERPL CALCULATED.3IONS-SCNC: 5 MMOL/L (ref 3–14)
BASOPHILS # BLD AUTO: 0 10E9/L (ref 0–0.2)
BASOPHILS NFR BLD AUTO: 0.1 %
BUN SERPL-MCNC: 32 MG/DL (ref 7–30)
CALCIUM SERPL-MCNC: 7.8 MG/DL (ref 8.5–10.1)
CHLORIDE SERPL-SCNC: 112 MMOL/L (ref 94–109)
CO2 SERPL-SCNC: 26 MMOL/L (ref 20–32)
CREAT SERPL-MCNC: 1.16 MG/DL (ref 0.66–1.25)
DIFFERENTIAL METHOD BLD: ABNORMAL
EOSINOPHIL # BLD AUTO: 0 10E9/L (ref 0–0.7)
EOSINOPHIL NFR BLD AUTO: 0.1 %
ERYTHROCYTE [DISTWIDTH] IN BLOOD BY AUTOMATED COUNT: 14.5 % (ref 10–15)
GFR SERPL CREATININE-BSD FRML MDRD: 56 ML/MIN/{1.73_M2}
GLUCOSE BLDC GLUCOMTR-MCNC: 120 MG/DL (ref 70–99)
GLUCOSE BLDC GLUCOMTR-MCNC: 121 MG/DL (ref 70–99)
GLUCOSE SERPL-MCNC: 104 MG/DL (ref 70–99)
HCT VFR BLD AUTO: 32.9 % (ref 40–53)
HGB BLD-MCNC: 10.4 G/DL (ref 13.3–17.7)
IMM GRANULOCYTES # BLD: 0 10E9/L (ref 0–0.4)
IMM GRANULOCYTES NFR BLD: 0.2 %
INR PPP: 1.28 (ref 0.86–1.14)
LYMPHOCYTES # BLD AUTO: 0.5 10E9/L (ref 0.8–5.3)
LYMPHOCYTES NFR BLD AUTO: 2.6 %
MCH RBC QN AUTO: 27.6 PG (ref 26.5–33)
MCHC RBC AUTO-ENTMCNC: 31.6 G/DL (ref 31.5–36.5)
MCV RBC AUTO: 87 FL (ref 78–100)
MONOCYTES # BLD AUTO: 1.4 10E9/L (ref 0–1.3)
MONOCYTES NFR BLD AUTO: 7.5 %
NEUTROPHILS # BLD AUTO: 17 10E9/L (ref 1.6–8.3)
NEUTROPHILS NFR BLD AUTO: 89.5 %
NRBC # BLD AUTO: 0 10*3/UL
NRBC BLD AUTO-RTO: 0 /100
PLATELET # BLD AUTO: 145 10E9/L (ref 150–450)
POTASSIUM SERPL-SCNC: 4.4 MMOL/L (ref 3.4–5.3)
PROCALCITONIN SERPL-MCNC: 54.51 NG/ML
RBC # BLD AUTO: 3.77 10E12/L (ref 4.4–5.9)
SODIUM SERPL-SCNC: 143 MMOL/L (ref 133–144)
WBC # BLD AUTO: 19 10E9/L (ref 4–11)

## 2020-03-24 PROCEDURE — 85610 PROTHROMBIN TIME: CPT | Performed by: HOSPITALIST

## 2020-03-24 PROCEDURE — 25800030 ZZH RX IP 258 OP 636: Performed by: HOSPITALIST

## 2020-03-24 PROCEDURE — 36415 COLL VENOUS BLD VENIPUNCTURE: CPT | Performed by: HOSPITALIST

## 2020-03-24 PROCEDURE — 25000125 ZZHC RX 250: Performed by: HOSPITALIST

## 2020-03-24 PROCEDURE — 00000146 ZZHCL STATISTIC GLUCOSE BY METER IP

## 2020-03-24 PROCEDURE — 99232 SBSQ HOSP IP/OBS MODERATE 35: CPT | Performed by: HOSPITALIST

## 2020-03-24 PROCEDURE — 25000132 ZZH RX MED GY IP 250 OP 250 PS 637: Performed by: HOSPITALIST

## 2020-03-24 PROCEDURE — 84145 PROCALCITONIN (PCT): CPT | Performed by: HOSPITALIST

## 2020-03-24 PROCEDURE — 85025 COMPLETE CBC W/AUTO DIFF WBC: CPT | Performed by: HOSPITALIST

## 2020-03-24 PROCEDURE — 80048 BASIC METABOLIC PNL TOTAL CA: CPT | Performed by: HOSPITALIST

## 2020-03-24 PROCEDURE — 12000000 ZZH R&B MED SURG/OB

## 2020-03-24 PROCEDURE — 93306 TTE W/DOPPLER COMPLETE: CPT | Mod: 26 | Performed by: INTERNAL MEDICINE

## 2020-03-24 PROCEDURE — 25000128 H RX IP 250 OP 636: Performed by: HOSPITALIST

## 2020-03-24 PROCEDURE — C9113 INJ PANTOPRAZOLE SODIUM, VIA: HCPCS | Performed by: HOSPITALIST

## 2020-03-24 PROCEDURE — 25500064 ZZH RX 255 OP 636: Performed by: HOSPITALIST

## 2020-03-24 PROCEDURE — 93306 TTE W/DOPPLER COMPLETE: CPT

## 2020-03-24 RX ORDER — METOPROLOL TARTRATE 25 MG/1
25 TABLET, FILM COATED ORAL 2 TIMES DAILY
Status: DISCONTINUED | OUTPATIENT
Start: 2020-03-24 | End: 2020-03-26

## 2020-03-24 RX ORDER — FAMOTIDINE 20 MG/1
40 TABLET, FILM COATED ORAL 2 TIMES DAILY PRN
Status: DISCONTINUED | OUTPATIENT
Start: 2020-03-24 | End: 2020-04-02 | Stop reason: HOSPADM

## 2020-03-24 RX ORDER — GABAPENTIN 300 MG/1
300 CAPSULE ORAL EVERY MORNING
Status: DISCONTINUED | OUTPATIENT
Start: 2020-03-24 | End: 2020-04-02 | Stop reason: HOSPADM

## 2020-03-24 RX ORDER — ATORVASTATIN CALCIUM 10 MG/1
10 TABLET, FILM COATED ORAL DAILY
Status: DISCONTINUED | OUTPATIENT
Start: 2020-03-24 | End: 2020-04-02 | Stop reason: HOSPADM

## 2020-03-24 RX ORDER — GABAPENTIN 300 MG/1
900 CAPSULE ORAL AT BEDTIME
Status: DISCONTINUED | OUTPATIENT
Start: 2020-03-24 | End: 2020-04-02 | Stop reason: HOSPADM

## 2020-03-24 RX ADMIN — SODIUM CHLORIDE: 9 INJECTION, SOLUTION INTRAVENOUS at 04:15

## 2020-03-24 RX ADMIN — PANTOPRAZOLE SODIUM 40 MG: 40 INJECTION, POWDER, FOR SOLUTION INTRAVENOUS at 09:02

## 2020-03-24 RX ADMIN — HUMAN ALBUMIN MICROSPHERES AND PERFLUTREN 9 ML: 10; .22 INJECTION, SOLUTION INTRAVENOUS at 15:00

## 2020-03-24 RX ADMIN — UMECLIDINIUM 1 PUFF: 62.5 AEROSOL, POWDER ORAL at 09:03

## 2020-03-24 RX ADMIN — PANTOPRAZOLE SODIUM 40 MG: 40 INJECTION, POWDER, FOR SOLUTION INTRAVENOUS at 21:52

## 2020-03-24 RX ADMIN — CLINDAMYCIN PHOSPHATE 600 MG: 600 INJECTION, SOLUTION INTRAVENOUS at 04:17

## 2020-03-24 RX ADMIN — METOPROLOL TARTRATE 25 MG: 25 TABLET ORAL at 09:02

## 2020-03-24 RX ADMIN — CEFEPIME HYDROCHLORIDE 1 G: 1 INJECTION, POWDER, FOR SOLUTION INTRAMUSCULAR; INTRAVENOUS at 19:10

## 2020-03-24 RX ADMIN — METOPROLOL TARTRATE 25 MG: 25 TABLET ORAL at 21:52

## 2020-03-24 RX ADMIN — CLINDAMYCIN PHOSPHATE 600 MG: 600 INJECTION, SOLUTION INTRAVENOUS at 19:46

## 2020-03-24 RX ADMIN — GABAPENTIN 300 MG: 300 CAPSULE ORAL at 09:02

## 2020-03-24 RX ADMIN — GABAPENTIN 900 MG: 300 CAPSULE ORAL at 21:52

## 2020-03-24 RX ADMIN — GABAPENTIN 900 MG: 300 CAPSULE ORAL at 01:55

## 2020-03-24 RX ADMIN — ATORVASTATIN CALCIUM 10 MG: 10 TABLET, FILM COATED ORAL at 09:02

## 2020-03-24 RX ADMIN — CLINDAMYCIN PHOSPHATE 600 MG: 600 INJECTION, SOLUTION INTRAVENOUS at 11:43

## 2020-03-24 RX ADMIN — Medication 2 MG: at 00:34

## 2020-03-24 ASSESSMENT — ACTIVITIES OF DAILY LIVING (ADL)
ADLS_ACUITY_SCORE: 18

## 2020-03-24 ASSESSMENT — MIFFLIN-ST. JEOR: SCORE: 1509.25

## 2020-03-24 NOTE — PROGRESS NOTES
DATE & TIME: 3/23 0438-7011    Cognitive Concerns/ Orientation : A&Ox4; forgetful   BEHAVIOR & AGGRESSION TOOL COLOR: green  CIWA SCORE: na   ABNL VS/O2: VSS on 4L; tachy at times  MOBILITY: In bed throughout shift, per report A1 with GB  PAIN MANAGMENT: denied on shift   DIET: clear liquids   BOWEL/BLADDER: Suazo, no BM on shift   ABNL LAB/BG: creat 2.03 Trop 0.738 WBC 28.1 Procal 84.35   DRAIN/DEVICES: Suazo, PIV infusing NS  TELEMETRY RHYTHM: SR  SKIN: bruises, tear on L arm, mepilex replaced; CDI  TESTS/PROCEDURES: na  D/C DAY/GOALS/PLACE: 3-5 days when hemoglobin stable and respiratory status improves per MD note  OTHER IMPORTANT INFO: Covid-19 negative. BLE neuropathy

## 2020-03-24 NOTE — CONSULTS
"Care Transition Initial Assessment - RN        Met with: Patient and Spoke with wife Roberta via phone.  DATA   Active Problems:    Hematemesis       Cognitive Status: alert and oriented.  Primary Care Clinic Name: [facility]  Primary Care MD Name:  Karson Bishop  Contact information and PCP information verified: Yes  Lives With: spouse      Quality of Family Relationships: supportive, helpful              Insurance concerns: No Insurance issues identified  ASSESSMENT  Patient currently receives the following services:  None.        Identified issues/concerns regarding health management: Safe discharge to home.  Pt is currently requiring assistance of one.    Also pt is requiring O2 and having slow improvement of his respiratory status.  Both Hermilo and his wife Roberta want him to go home and neither of them want to have home care.  When I explained to Hermilo we are obligated to create a safe discharge plan he stated \"oh okay I understand that.\"  PLAN  Financial costs for the patient include co-pays .  Patient given options and choices for discharge:  Both pt and his wife do not want to discuss home care and choices unless it is recommended.    Patient/family is agreeable to the plan?  Both the pt and his wife do not want the pt to go to TCU and neither of them are keen on the idea of home care services.  PT/OT orders are in for them to evaluate the pt.  Patient anticipates discharging to hopefully home/TBD .        Patient anticipates needs for home equipment: No, pt states he has a cane, walker, and grab bars in the bathroom at home.   Transportation/person available to transport on day of discharge  is family and have they been notified/set up TBD  Plan/Disposition: TBD   Appointments: TBD      Care  (CTS) will continue to follow as needed.        Ember Bourgeois RN, BSN Care Coordinator  [facility]  Mobile: [phone]    "

## 2020-03-24 NOTE — PLAN OF CARE
DATE & TIME: 3/24/220 8110-7017                    Cognitive Concerns/ Orientation : A&Ox4; forgetful   BEHAVIOR & AGGRESSION TOOL COLOR: Green  CIWA SCORE: na      ABNL VS/O2: VSS on 4L NC  MOBILITY: Up with 1 gb and walker  PAIN MANAGMENT: Denies  DIET: Mech soft  BOWEL/BLADDER: Continent, urinal at bedside  ABNL LAB/BG: WBC 19.0 Hgb 10.4 Plt 145 Procal 54.51 MD aware INR 1.28  DRAIN/DEVICES: PIV SL  TELEMETRY RHYTHM: NSR  SKIN: bruises, skin tear on L arm, mepilex  CDI  TESTS/PROCEDURES: N/A  D/C DAY/GOALS/PLACE: Pending progress  OTHER IMPORTANT INFO: BLE neuropathy. PT/OT consulted. GI following.

## 2020-03-24 NOTE — PLAN OF CARE
DATE & TIME: 3/23/2020 8976-8486    Cognitive Concerns/ Orientation : A+Ox4   BEHAVIOR & AGGRESSION TOOL COLOR: Green; calm & cooperative  CIWA SCORE: NA   ABNL VS/O2: RA   MOBILITY: Assist x1 GB  PAIN MANAGMENT: Denies  DIET: Clears  BOWEL/BLADDER: Suazo, bathroom for BM  ABNL LAB/BG: Lactate, Hgb  DRAIN/DEVICES: Suazo  TELEMETRY RHYTHM: R BBB  SKIN: Bruises, tear on L arm  TESTS/PROCEDURES: None  D/C DAY/GOALS/PLACE: TBD  OTHER IMPORTANT INFO: COVID neg

## 2020-03-24 NOTE — PROGRESS NOTES
[facility]    Medicine Progress Note - Hospitalist Service       Date of Admission:  3/22/2020  Assessment & Plan     Hermilo Velasquez is a 87 year old male admitted to the intensive care unit on 3/22/2020 with septic vs hypovolemic shock likely secondary to hematemesis and aspiration pneumonia.     Hypovolemic shock   Lactic acidosis   Resolved with intravenous fluid and packed red blood cells    Hematemesis due to acute upper gastrointestinal hemorrhage in the setting of anticoagulation with warfarin   Acute blood loss anemia   History of gastroesophageal reflux disease, gastric and duodenal angiectasias   Patient's wife found him in bed covered in dark emesis with coffee-ground appearance and also had diarrhea.  Hemoglobin 12.8 in ED.  Patient was given 1 unit PRBC in ED.  He also received vitamin K and intravenous proton pump inhibitor.  Of note he had a upper endoscopy July 2018 with normal esophagus, a few 1 to 2 mm nonbleeding angiectasias in the stomach as well as two 2 to 3 mm angiectasias in the second portion of the duodenum.  Colonoscopy at that time also showed non-bleeding angioid ectasias and several polyps.  Hemoglobin down 1 gram overnight but no overt bleeding.    Advance diet     Continue intravenous proton pump inhibitor bid     Monitor hemoglobin     INR today     Deferring esophagogastroduodenoscopy per GI     Likely aspiration pneumonia, bilateral, rule out COVID-19  Acute respiratory failure, hypoxic   Chronic obstructive lung disease   COVID PCR negative.  Respiratory status is stable. Pro-calcitonin was 85 and is down to 50's today.  He has no fever but remains on supplemental oxygen.    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  Per his wife he had been feeling at baseline up until this morning.  He reports compliance with his home medications.  Initial troponin 0.58, EKG without signs of overt ischemia.    Continue telemetry monitoring    Transthoracic echocardiogram    Continue bid metoprolol    No aspirin due to upper gastrointestinal hemorrhage      Paroxysmal atrial fibrillation on warfarin  INR   Date Value Ref Range Status   03/23/2020 1.68 (H) 0.86 - 1.14 Final   He has received 2 doses of vitamin K.  INR today    Continue to hold warfarin- would not resume unless we have an endoscopic diagnosis    Hyperlipidemia    Continue PTA statin     Chronic pruritus    PTA Atarax held for now    History of lung cancer and NHL in remission     Diet: Mechanical/Dental Soft Diet    DVT Prophylaxis: Pneumatic Compression Devices  Suazo Catheter: in place, indication: Retention  Code Status: Full Code      Disposition Plan    Transfer out of the intensive care unit.  Expected discharge: 2-3 days to home versus transitional care unit when hemoglobin stable and respiratory status has improved.  Entered: Bennie Rodney MD 03/24/2020, 11:55 AM       The patient's care was discussed with the Patient and nurse     Bennie Rodney MD  Hospitalist Service  [facility]    ______________________________________________________________________    Interval History   Stable overnight. No pain or complaints.  He is hungry- no abdominal pain    Data reviewed today: I reviewed all medications, new labs and imaging results over the last 24 hours. I personally reviewed no images or EKG's today.    Physical Exam   Vital Signs: Temp: 97.8   F (36.6  C) Temp src: Oral BP: 119/58 Pulse: 108 Heart Rate: 77 Resp: 18 SpO2: 91 % O2 Device: Nasal cannula Oxygen Delivery: 5 LPM  Weight: 182 lbs 8.65 oz  Constitutional: awake, alert, cooperative, no apparent distress  ENT: Normocephalic, without obvious abnormality, atraumatic  Respiratory: No increased work of breathing, good air exchange, clear to auscultation bilaterally, no crackles or wheezing  Cardiovascular: RR normal S1 and S2, no S3 or S4, and no murmur noted  GI: normal bowel sounds, soft, non-distended, non-tender, no masses palpated  Skin: no rashes and no jaundice  Neuropsychiatric: General: normal, calm and normal eye contact    Data   Recent Labs   Lab 03/24/20  0854 03/23/20  0459 03/22/20  2328 03/22/20  1914   WBC 19.0* 28.1*  --  32.1*   HGB 10.4* 11.6* 12.3* 12.8*   MCV 87 87  --  89   * 165  --  214   INR  --  1.68*  --  1.93*    139  --  138   POTASSIUM 4.4 5.0  --  4.7   CHLORIDE 112* 111*  --  103   CO2 26 23  --  26   BUN 32* 42*  --  33*   CR 1.16 2.03*  --  2.57*   ANIONGAP 5 5  --  9   AMRITA 7.8* 7.5*  --  8.4*   * 148*  --  147*   ALBUMIN  --  2.2*  --  2.6*   PROTTOTAL  --  5.4*  --  6.2*   BILITOTAL  --  0.6  --  0.6   ALKPHOS  --  55  --  80   ALT  --  30  --  27   AST  --  126*  --  101*   LIPASE  --   --   --  54*   TROPI  --  0.738*  --  0.580*     No results found for this or any previous visit (from the past 24 hour(s)).  Medications     sodium chloride 75 mL/hr at 03/24/20 0415       atorvastatin  10 mg Oral Daily     ceFEPIme (MAXIPIME) IV  1 g Intravenous Q24H     clindamycin  600 mg Intravenous Q8H     gabapentin  300 mg Oral QAM     gabapentin  900 mg Oral At Bedtime     metoprolol tartrate  25 mg Oral BID     pantoprazole (PROTONIX) IV  40 mg Intravenous BID     umeclidinium  1 puff Inhalation Daily

## 2020-03-24 NOTE — PROGRESS NOTES
"GASTROENTEROLOGY PROGRESS NOTE    SUBJECTIVE: No complaints. Does not remember ever vomiting. Per nurse, stool on bedpan today was like \"melted chocolate\". No melena or red blood. Pt denies abdominal pain, chest pain, and SOB.    OBJECTIVE: Sitting in chair. Disheveled. On 2L oxygen.  /58 (BP Location: Right arm)   Pulse 108   Temp 97.8  F (36.6  C) (Oral)   Resp 18   Ht 1.778 m (5' 10\")   Wt 82.8 kg (182 lb 8.7 oz)   SpO2 91%   BMI 26.19 kg/m    Temp (24hrs), Av.9  F (36.6  C), Min:97.5  F (36.4  C), Max:98.7  F (37.1  C)    Patient Vitals for the past 72 hrs:   Weight   20 0558 82.8 kg (182 lb 8.7 oz)   20 0400 83.2 kg (183 lb 6.8 oz)   20 83.2 kg (183 lb 6.8 oz)   20 81.6 kg (180 lb)       Intake/Output Summary (Last 24 hours) at 3/24/2020 1241  Last data filed at 3/23/2020 2041  Gross per 24 hour   Intake 800.83 ml   Output 550 ml   Net 250.83 ml     PHYSICAL EXAM  Constitutional: NAD, comfortable  Cardiovascular: Tachy, no edema  Abdomen: Soft, non-tender, nondistended  Skin: Scattered bruising, no jaundice    I have reviewed the patient's new clinical lab results:    Recent Labs   Lab Test 20  0854 20   WBC 19.0* 28.1*  --  32.1*  --    HGB 10.4* 11.6* 12.3* 12.8*  --    MCV 87 87  --  89  --    * 165  --  214  --    INR  --  1.68*  --  1.93* 2.0*     Recent Labs   Lab Test 20  0854 20    139 138   POTASSIUM 4.4 5.0 4.7   CHLORIDE 112* 111* 103   CO2 26 23 26   BUN 32* 42* 33*   CR 1.16 2.03* 2.57*   ANIONGAP 5 5 9   AMRITA 7.8* 7.5* 8.4*     Recent Labs   Lab Test 20  0459 20  1950 20  1914 19  1614 19  1342  09/03/15  0550   ALBUMIN 2.2*  --  2.6*  --  2.8*   < >  --    BILITOTAL 0.6  --  0.6  --  0.7   < >  --    ALT 30  --  27  --  11   < >  --    *  --  101*  --  11   < >  --    ALKPHOS 55  --  80  --  86   < >  --  "   PROTEIN  --  10*  --  10*  --   --  Negative   LIPASE  --   --  54*  --   --   --   --     < > = values in this interval not displayed.     Active Problems:  COFFEE GROUND EMESIS    Assessment: 87 year old male with afib on Coumadin, lung cancer s/p resection 1/2020, and non hodgkin's lymphoma admitted after his wife found him laying in dark emesis and stool. Suspected aspiration pneumonia. Troponin 0.7. Procalcitonin 84. Cr 2.5. INR ~2. Hgb 12-->10.    Last EGD and colonoscopy in 2018 (for PATIENCE) significant for AVMs. Normal hemoglobin runs in the 11-12 range.He has been on a PPI for reflux symptoms. In late 2019, he was transitioned to a H2 blocker.    Differential diagnosis includes AVM, esophagitis/gastritis, alethea lesions, shon wei tear, PUD, malignancy.               Hgb has drifted a bit, but stool is brown so no EGD planned (risk is greater than benefit).      Plan:   -Continue PPI BID x 4 weeks then daily  -H2 blocker prn for break through refulx  -ADAT  -No plans for EGD unless there is obvious bleeding with hgb drop    GI will sign off. Please call if we can be of assistance.    Mara Valdez PA-C  Cell: [phone]  [facility]  Office: [phone]

## 2020-03-25 ENCOUNTER — APPOINTMENT (OUTPATIENT)
Dept: OCCUPATIONAL THERAPY | Facility: CLINIC | Age: 85
DRG: 871 | End: 2020-03-25
Attending: HOSPITALIST
Payer: COMMERCIAL

## 2020-03-25 ENCOUNTER — APPOINTMENT (OUTPATIENT)
Dept: PHYSICAL THERAPY | Facility: CLINIC | Age: 85
DRG: 871 | End: 2020-03-25
Attending: HOSPITALIST
Payer: COMMERCIAL

## 2020-03-25 LAB
ANION GAP SERPL CALCULATED.3IONS-SCNC: 2 MMOL/L (ref 3–14)
BASOPHILS # BLD AUTO: 0 10E9/L (ref 0–0.2)
BASOPHILS NFR BLD AUTO: 0.1 %
BUN SERPL-MCNC: 27 MG/DL (ref 7–30)
CALCIUM SERPL-MCNC: 8.2 MG/DL (ref 8.5–10.1)
CHLORIDE SERPL-SCNC: 109 MMOL/L (ref 94–109)
CO2 SERPL-SCNC: 27 MMOL/L (ref 20–32)
CREAT SERPL-MCNC: 0.94 MG/DL (ref 0.66–1.25)
DIFFERENTIAL METHOD BLD: ABNORMAL
EOSINOPHIL # BLD AUTO: 0.6 10E9/L (ref 0–0.7)
EOSINOPHIL NFR BLD AUTO: 4.3 %
ERYTHROCYTE [DISTWIDTH] IN BLOOD BY AUTOMATED COUNT: 14.3 % (ref 10–15)
GFR SERPL CREATININE-BSD FRML MDRD: 72 ML/MIN/{1.73_M2}
GLUCOSE SERPL-MCNC: 114 MG/DL (ref 70–99)
HCT VFR BLD AUTO: 33.8 % (ref 40–53)
HGB BLD-MCNC: 10.6 G/DL (ref 13.3–17.7)
IMM GRANULOCYTES # BLD: 0 10E9/L (ref 0–0.4)
IMM GRANULOCYTES NFR BLD: 0.2 %
LYMPHOCYTES # BLD AUTO: 0.6 10E9/L (ref 0.8–5.3)
LYMPHOCYTES NFR BLD AUTO: 4.6 %
MCH RBC QN AUTO: 27.5 PG (ref 26.5–33)
MCHC RBC AUTO-ENTMCNC: 31.4 G/DL (ref 31.5–36.5)
MCV RBC AUTO: 88 FL (ref 78–100)
MONOCYTES # BLD AUTO: 1.1 10E9/L (ref 0–1.3)
MONOCYTES NFR BLD AUTO: 8.1 %
NEUTROPHILS # BLD AUTO: 10.8 10E9/L (ref 1.6–8.3)
NEUTROPHILS NFR BLD AUTO: 82.7 %
NRBC # BLD AUTO: 0 10*3/UL
NRBC BLD AUTO-RTO: 0 /100
PLATELET # BLD AUTO: 134 10E9/L (ref 150–450)
POTASSIUM SERPL-SCNC: 4.3 MMOL/L (ref 3.4–5.3)
PROCALCITONIN SERPL-MCNC: 32.08 NG/ML
RBC # BLD AUTO: 3.86 10E12/L (ref 4.4–5.9)
SODIUM SERPL-SCNC: 138 MMOL/L (ref 133–144)
WBC # BLD AUTO: 13.1 10E9/L (ref 4–11)

## 2020-03-25 PROCEDURE — 25000128 H RX IP 250 OP 636: Performed by: HOSPITALIST

## 2020-03-25 PROCEDURE — 25000125 ZZHC RX 250: Performed by: HOSPITALIST

## 2020-03-25 PROCEDURE — 80048 BASIC METABOLIC PNL TOTAL CA: CPT | Performed by: HOSPITALIST

## 2020-03-25 PROCEDURE — 97165 OT EVAL LOW COMPLEX 30 MIN: CPT | Mod: GO

## 2020-03-25 PROCEDURE — G0463 HOSPITAL OUTPT CLINIC VISIT: HCPCS

## 2020-03-25 PROCEDURE — 85025 COMPLETE CBC W/AUTO DIFF WBC: CPT | Performed by: HOSPITALIST

## 2020-03-25 PROCEDURE — 25000132 ZZH RX MED GY IP 250 OP 250 PS 637: Performed by: HOSPITALIST

## 2020-03-25 PROCEDURE — C9113 INJ PANTOPRAZOLE SODIUM, VIA: HCPCS | Performed by: HOSPITALIST

## 2020-03-25 PROCEDURE — 36415 COLL VENOUS BLD VENIPUNCTURE: CPT | Performed by: HOSPITALIST

## 2020-03-25 PROCEDURE — 97535 SELF CARE MNGMENT TRAINING: CPT | Mod: GO

## 2020-03-25 PROCEDURE — 12000000 ZZH R&B MED SURG/OB

## 2020-03-25 PROCEDURE — 97162 PT EVAL MOD COMPLEX 30 MIN: CPT | Mod: GP

## 2020-03-25 PROCEDURE — 97116 GAIT TRAINING THERAPY: CPT | Mod: GP

## 2020-03-25 PROCEDURE — 99233 SBSQ HOSP IP/OBS HIGH 50: CPT | Performed by: INTERNAL MEDICINE

## 2020-03-25 PROCEDURE — 84145 PROCALCITONIN (PCT): CPT | Performed by: HOSPITALIST

## 2020-03-25 RX ADMIN — GABAPENTIN 900 MG: 300 CAPSULE ORAL at 20:19

## 2020-03-25 RX ADMIN — CLINDAMYCIN PHOSPHATE 600 MG: 600 INJECTION, SOLUTION INTRAVENOUS at 20:19

## 2020-03-25 RX ADMIN — METOPROLOL TARTRATE 25 MG: 25 TABLET ORAL at 08:29

## 2020-03-25 RX ADMIN — CLINDAMYCIN PHOSPHATE 600 MG: 600 INJECTION, SOLUTION INTRAVENOUS at 11:49

## 2020-03-25 RX ADMIN — ATORVASTATIN CALCIUM 10 MG: 10 TABLET, FILM COATED ORAL at 08:29

## 2020-03-25 RX ADMIN — METOPROLOL TARTRATE 25 MG: 25 TABLET ORAL at 20:21

## 2020-03-25 RX ADMIN — PANTOPRAZOLE SODIUM 40 MG: 40 INJECTION, POWDER, FOR SOLUTION INTRAVENOUS at 20:22

## 2020-03-25 RX ADMIN — UMECLIDINIUM 1 PUFF: 62.5 AEROSOL, POWDER ORAL at 08:29

## 2020-03-25 RX ADMIN — PANTOPRAZOLE SODIUM 40 MG: 40 INJECTION, POWDER, FOR SOLUTION INTRAVENOUS at 08:29

## 2020-03-25 RX ADMIN — CEFEPIME HYDROCHLORIDE 1 G: 1 INJECTION, POWDER, FOR SOLUTION INTRAMUSCULAR; INTRAVENOUS at 18:13

## 2020-03-25 RX ADMIN — GABAPENTIN 300 MG: 300 CAPSULE ORAL at 08:29

## 2020-03-25 RX ADMIN — CLINDAMYCIN PHOSPHATE 600 MG: 600 INJECTION, SOLUTION INTRAVENOUS at 04:13

## 2020-03-25 ASSESSMENT — ACTIVITIES OF DAILY LIVING (ADL)
ADLS_ACUITY_SCORE: 18
ADLS_ACUITY_SCORE: 18
ADLS_ACUITY_SCORE: 17
ADLS_ACUITY_SCORE: 17
ADLS_ACUITY_SCORE: 18
ADLS_ACUITY_SCORE: 17

## 2020-03-25 ASSESSMENT — MIFFLIN-ST. JEOR: SCORE: 1516.25

## 2020-03-25 NOTE — PROGRESS NOTES
03/25/20 0853   Quick Adds   Type of Visit Initial Occupational Therapy Evaluation   Living Environment   Lives With spouse   Living Arrangements house  (Two story home )   Home Accessibility stairs to enter home;stairs within home   Transportation Anticipated family or friend will provide  (Pt typically drives )   Living Environment Comment Spouse can assist as needed at home.    Self-Care   Usual Activity Tolerance good   Current Activity Tolerance moderate   Regular Exercise No   Equipment Currently Used at Home cane, straight   Activity/Exercise/Self-Care Comment At baseline: Pt reported independence in mobility within the home. Uses a cane outside of the home.    Functional Level   Ambulation 1-->assistive equipment   Transferring 1-->assistive equipment   Toileting 0-->independent   Bathing 0-->independent   Dressing 0-->independent   Fall history within last six months yes   Number of times patient has fallen within last six months 1   General Information   Onset of Illness/Injury or Date of Surgery - Date 03/22/20   Referring Physician Bennie Rodney MD    Patient/Family Goals Statement Home    Additional Occupational Profile Info/Pertinent History of Current Problem Per chart: Hermilo Velasquez is a 87 year old male admitted to the intensive care unit on 3/22/2020 with septic vs hypovolemic shock likely secondary to hematemesis and aspiration pneumonia   Cognitive Status Examination   Orientation orientation to person, place and time   Level of Consciousness alert   Pain Assessment   Patient Currently in Pain Yes, see Vital Sign flowsheet   Transfer Skills   Transfer Transfer Skill: Stand to Sit;Transfer Safety Analysis Bed/Chair;Transfer Safety Analysis Sit/Stand   Transfer Skill: Bed to Chair/Chair to Bed   Level of [place]: Bed to Chair contact guard   Physical Assist/Nonphysical Assist: Bed to Chair verbal cues;set-up required;1 person assist   Transfer Skill: Sit to Stand   Level of  "[place]: Sit/Stand contact guard   Physical Assist/Nonphysical Assist: Sit/Stand set-up required;verbal cues;1 person assist   Toilet Transfer   Toilet Transfer Toilet Transfer Skill;Toilet Transfer Safety Analysis   Transfer Skill: Toilet Transfer   Level of [place]: Toilet minimum assist (75% patients effort)   Physical Assist/Nonphysical Assist: Toilet set-up required;verbal cues;1 person assist   Lower Body Dressing   Level of [place]: Dress Lower Body contact guard   Physical Assist/Nonphysical Assist: Dress Lower Body set-up required;1 person assist;verbal cues   Grooming   Level of [place]: Grooming minimum assist (75% patients effort)   Physical Assist/Nonphysical Assist: Grooming set-up required;verbal cues;1 person assist   Instrumental Activities of Daily Living (IADL)   Previous Responsibilities meal prep;housekeeping;laundry;yardwork;finances;medication management;driving   General Therapy Interventions   Planned Therapy Interventions IADL retraining;ADL retraining;cognition;strengthening;transfer training   Clinical Impression   Criteria for Skilled Therapeutic Interventions Met yes, treatment indicated   OT Diagnosis Decreased independence in I/ADLs   Influenced by the following impairments Decreased independence in I/ADLs   Assessment of Occupational Performance 1-3 Performance Deficits   Identified Performance Deficits Decreased independence in I/ADLs (Dressing, bathing, toileting)   Clinical Decision Making (Complexity) Low complexity   Therapy Frequency Daily   Predicted Duration of Therapy Intervention (days/wks) 4 days   Anticipated Discharge Disposition Transitional Care Facility   Risks and Benefits of Treatment have been explained. Yes   Patient, Family & other staff in agreement with plan of care Yes   [facility] AM-PAC TM \"6 Clicks\"   2016, Trustees of [facility], under license to [employer].  All rights reserved.   6 Clicks Short Forms Daily Activity " "Inpatient Short Form   [facility] AM-PAC  \"6 Clicks\" Daily Activity Inpatient Short Form   1. Putting on and taking off regular lower body clothing? 3 - A Little   2. Bathing (including washing, rinsing, drying)? 2 - A Lot   3. Toileting, which includes using toilet, bedpan or urinal? 3 - A Little   4. Putting on and taking off regular upper body clothing? 4 - None   5. Taking care of personal grooming such as brushing teeth? 3 - A Little   6. Eating meals? 4 - None   Daily Activity Raw Score (Score out of 24.Lower scores equate to lower levels of function) 19   Total Evaluation Time   Total Evaluation Time (Minutes) 8     "

## 2020-03-25 NOTE — PLAN OF CARE
DATE & TIME: 3/24/20 1790-1103                   Cognitive Concerns/ Orientation : A&Ox4; forgetful   BEHAVIOR & AGGRESSION TOOL COLOR: Green  CIWA SCORE: na      ABNL VS/O2: VSS on 4L NC  MOBILITY: Ax1 with gb and walker. Up in chair for some time this afternoon.   PAIN MANAGMENT: Denies  DIET: [facility] soft  BOWEL/BLADDER: Continent, urinal at bedside  ABNL LAB/BG:  and 120, WBC 19.0,  Hgb 10.4,  Plt 145, Procal 54.51 MD aware INR 1.28  DRAIN/DEVICES: PIV SL  TELEMETRY RHYTHM: NSR  SKIN: Bruises. Skin tear on L arm that I scabbing, pink/red, sanguineous drainage,  mepilex  dressing changed. WOC consulted.   TESTS/PROCEDURES: Echo today and showed no new changes since his last Echo in 2018.   D/C DAY/GOALS/PLACE: 2-3 days pending progress  OTHER IMPORTANT INFO: BLE neuropathy. PT/OT consulted. GI following.

## 2020-03-25 NOTE — PLAN OF CARE
OT- Evaluation and treatment initiated. Pt lives in a multi level home with spouse. Prior pt independent in all I/ADLs.   Discharge Planner OT   Patient plan for discharge: Home   Current status: Pt ambulated to the bathroom with CGA-minimum assist and walker and transferred to/from the toilet with minimum assist. Pt completed 1 hygiene task while standing at the sink with CGA-minimum assist. Pt demonstrated unsteadiness with functional mobility, transfers, and ADLs during session. Oxygen destats with activity 85% on nasal canula (see vitals flow sheet for details).   Barriers to return to prior living situation: Current level of A for I/ADLs and functional mobility   Recommendations for discharge: TCU  Rationale for recommendations: Pt is below baseline in I/ADLs and functional mobility. Pt will benefit from skilled therapy to address safety and independence in I/ADLs. If pt returns home, pt will need increased assist in all I/ADLs and home OT.        Entered by: Beatriz Delcid 03/25/2020 9:43 AM

## 2020-03-25 NOTE — PROGRESS NOTES
03/25/20 1330   Quick Adds   Type of Visit Initial PT Evaluation   Living Environment   Lives With spouse   Living Arrangements house   Home Accessibility stairs to enter home;stairs within home   Number of Stairs, Main Entrance 3   Number of Stairs, Within Home, Primary 10   Stair Railings, Within Home, Primary railing on right side (ascending)   Self-Care   Usual Activity Tolerance good   Current Activity Tolerance moderate   Regular Exercise No   Equipment Currently Used at Home cane, straight   Functional Level Prior   Ambulation 1-->assistive equipment   Transferring 1-->assistive equipment   Fall history within last six months yes   Number of times patient has fallen within last six months 1   Which of the above functional risks had a recent onset or change? ambulation;transferring   General Information   Onset of Illness/Injury or Date of Surgery - Date 03/22/20   Referring Physician Bennie Rodney MD   Patient/Family Goals Statement Return home   Pertinent History of Current Problem (include personal factors and/or comorbidities that impact the POC) Pt admitted with hematemesis, septic shock, B pneumonia, FRED, NSTEMI thought to be due to demand ischemia, anemia, acute hypoxic respiratory failure. PMH: COPD, afib, non-Hodgkins lymphoma   Precautions/Limitations fall precautions;oxygen therapy device and L/min  (none baseline, 2.5L currently)   Weight-Bearing Status - LLE full weight-bearing   Weight-Bearing Status - RLE full weight-bearing   Cognitive Status Examination   Orientation orientation to person, place and time   Level of Consciousness alert   Follows Commands and Answers Questions 100% of the time;able to follow single-step instructions   Personal Safety and Judgment intact   Pain Assessment   Patient Currently in Pain No   Posture    Posture Forward head position;Protracted shoulders   Range of Motion (ROM)   ROM Quick Adds No deficits were identified   Strength   Strength Comments B hip  "flex 3+/5, knee ext 5/5, DF 4+/5   Bed Mobility   Bed Mobility Comments NT, pt in the chair   Transfer Skills   Transfer Comments Sit to stand with SBA and FWW   Gait   Gait Comments Pt ambulated 10 ft wtih FWW and CGA, dec in balance noted   Balance   Balance Comments Balance dec with mobility wtih FWW.   Sensory Examination   Sensory Perception Comments Baseline neuropathy in feet and ankles. States both numbness and pins and needles sensation   General Therapy Interventions   Planned Therapy Interventions bed mobility training;gait training;transfer training;neuromuscular re-education;strengthening   Clinical Impression   Criteria for Skilled Therapeutic Intervention yes, treatment indicated   PT Diagnosis Difficulty ambulating   Influenced by the following impairments Dec strength, balance, activity tolearnce, SOB   Functional limitations due to impairments Difficulty ambulating and transferring   Clinical Presentation Stable/Uncomplicated   Clinical Presentation Rationale medically improving   Clinical Decision Making (Complexity) Moderate complexity   Therapy Frequency Daily   Predicted Duration of Therapy Intervention (days/wks) 4 days   Anticipated Discharge Disposition Transitional Care Facility   Risk & Benefits of therapy have been explained Yes   Patient, Family & other staff in agreement with plan of care Yes   [facility] CATASYS TM \"6 Clicks\"   2016, Trustees of [facility], under license to [employer].  All rights reserved.   6 Clicks Short Forms Basic Mobility Inpatient Short Form   [facility] [employer]  \"6 Clicks\" V.2 Basic Mobility Inpatient Short Form   1. Turning from your back to your side while in a flat bed without using bedrails? 3 - A Little   2. Moving from lying on your back to sitting on the side of a flat bed without using bedrails? 3 - A Little   3. Moving to and from a bed to a chair (including a wheelchair)? 3 - A Little   4. Standing up from a chair using your arms " (e.g., wheelchair, or bedside chair)? 3 - A Little   5. To walk in hospital room? 3 - A Little   6. Climbing 3-5 steps with a railing? 3 - A Little   Basic Mobility Raw Score (Score out of 24.Lower scores equate to lower levels of function) 18   Total Evaluation Time   Total Evaluation Time (Minutes) 15

## 2020-03-25 NOTE — PROGRESS NOTES
[facility]  Hospitalist Progress Note  En Malagon MD  03/25/2020    Assessment & Plan   Hermilo Velasquez is a 87 year old male admitted to the intensive care unit on 3/22/2020 with septic vs hypovolemic shock likely secondary to hematemesis and aspiration pneumonia.     Hypovolemic shock   Lactic acidosis   -- Resolved with intravenous fluid and packed red blood cells  -- stable bp and not tachycardic     Hematemesis due to acute upper gastrointestinal hemorrhage in the setting of anticoagulation with warfarin   Acute blood loss anemia   History of gastroesophageal reflux disease, gastric and duodenal angiectasias   Patient's wife found him in bed covered in dark emesis with coffee-ground appearance and also had diarrhea.  Hemoglobin 12.8 in ED.  Patient was given 1 unit PRBC in ED.  He also received vitamin K and intravenous proton pump inhibitor.  Of note he had a upper endoscopy July 2018 with normal esophagus, a few 1 to 2 mm nonbleeding angiectasias in the stomach as well as two 2 to 3 mm angiectasias in the second portion of the duodenum.  Colonoscopy at that time also showed non-bleeding angioid ectasias and several polyps.  Hemoglobin down 1 gram overnight but no overt bleeding.    Tolerating regular diet    Change to po proton pump inhibitor bid for 4 weeks than daily, prn H2 blocker    Monitor hemoglobin, stabilized around mid 10 range    INR 1.28    Deferring esophagogastroduodenoscopy per GI      Likely aspiration pneumonia, bilateral, rule out COVID-19  Acute respiratory failure, hypoxic   Chronic obstructive lung disease   COVID PCR negative.  Respiratory status is stable. Pro-calcitonin was 85 and is down to 50's today.  He has no fever but remains on supplemental oxygen.    Wean oxygen as able     Continue intravenous cefepime and clindamycin (PCN allergy)    Inhaled bronchodilators as needed; continue umeclidinium     Acute kidney injury     Remove Suazo    Creatinine  "improved 2 > 1.1.6 > 0.94     NSTEMI, likely type 2 - demand ischemia  Hypertension on metoprolol succinate prior to admission   Patient denies any recent chest pain, palpitations, or shortness of breath however he is a poor historian.  Per his wife he had been feeling at baseline up until this morning.  He reports compliance with his home medications.  Initial troponin 0.58, EKG without signs of overt ischemia.    discontinue telemetry monitoring    Transthoracic echocardiogram shows normal LVEF 55-60%, milld LVH, mild LAE    Continue bid metoprolol    No aspirin due to upper gastrointestinal hemorrhage      Paroxysmal atrial fibrillation on warfarin    Continue to hold warfarin- would not resume unless we have an endoscopic diagnosis    INR 1.29     Hyperlipidemia    Continue PTA statin     Chronic pruritus    PTA Atarax       History of lung cancer and NHL in remission      Diet: Mechanical/Dental Soft Diet    DVT Prophylaxis: Pneumatic Compression Devices  Suazo Catheter: in place, indication: Retention  Code Status: Full Code        Disposition Plan  -- therapies working, recommending TCU, but patient wants to go home with wife and refusing home care.  -- anticipate on 3/26      Interval History   -- chart reviewed  -- discussed with RN  -- all labs are improving and hgb stable    -Data reviewed today: I reviewed all new labs and imaging over the last 24 hours. I personally reviewed no images or EKG's today.    Physical Exam   Heart Rate: 58, Blood pressure 112/58, pulse 77, temperature 97.4  F (36.3  C), temperature source Oral, resp. rate 18, height 1.778 m (5' 10\"), weight 83.5 kg (184 lb 1.4 oz), SpO2 (!) 85 %.  Vitals:    03/23/20 0400 03/24/20 0558 03/25/20 0615   Weight: 83.2 kg (183 lb 6.8 oz) 82.8 kg (182 lb 8.7 oz) 83.5 kg (184 lb 1.4 oz)     Vital Signs with Ranges  Temp:  [97.4  F (36.3  C)-97.9  F (36.6  C)] 97.4  F (36.3  C)  Pulse:  [77] 77  Heart Rate:  [58-77] 58  Resp:  [16-18] 18  BP: " (100-120)/(49-71) 112/58  SpO2:  [85 %-94 %] 85 %  I/O's Last 24 hours  I/O last 3 completed shifts:  In: 0   Out: 1350 [Urine:1350]    Constitutional: Awake, alert, cooperative, no apparent distress  Respiratory: Clear to auscultation bilaterally, no crackles or wheezing  Cardiovascular: Regular rate and rhythm, normal S1 and S2, and no murmur noted  GI: Normal bowel sounds, soft, non-distended, non-tender  Skin/Integumen: No rashes, no cyanosis, no edema  Other:      Medications   All medications were reviewed.      atorvastatin  10 mg Oral Daily     ceFEPIme (MAXIPIME) IV  1 g Intravenous Q24H     clindamycin  600 mg Intravenous Q8H     gabapentin  300 mg Oral QAM     gabapentin  900 mg Oral At Bedtime     metoprolol tartrate  25 mg Oral BID     pantoprazole (PROTONIX) IV  40 mg Intravenous BID     umeclidinium  1 puff Inhalation Daily        Data   Recent Labs   Lab 03/25/20  0740 03/24/20  1201 03/24/20  0854 03/23/20  0459  03/22/20  1914   WBC 13.1*  --  19.0* 28.1*  --  32.1*   HGB 10.6*  --  10.4* 11.6*   < > 12.8*   MCV 88  --  87 87  --  89   *  --  145* 165  --  214   INR  --  1.28*  --  1.68*  --  1.93*     --  143 139  --  138   POTASSIUM 4.3  --  4.4 5.0  --  4.7   CHLORIDE 109  --  112* 111*  --  103   CO2 27  --  26 23  --  26   BUN 27  --  32* 42*  --  33*   CR 0.94  --  1.16 2.03*  --  2.57*   ANIONGAP 2*  --  5 5  --  9   AMRITA 8.2*  --  7.8* 7.5*  --  8.4*   *  --  104* 148*  --  147*   ALBUMIN  --   --   --  2.2*  --  2.6*   PROTTOTAL  --   --   --  5.4*  --  6.2*   BILITOTAL  --   --   --  0.6  --  0.6   ALKPHOS  --   --   --  55  --  80   ALT  --   --   --  30  --  27   AST  --   --   --  126*  --  101*   LIPASE  --   --   --   --   --  54*   TROPI  --   --   --  0.738*  --  0.580*    < > = values in this interval not displayed.       Recent Results (from the past 24 hour(s))   Echocardiogram Complete    Narrative     [id number]  Formerly [facility]  [id number]  [id number]^CK^MARIA G^D           [facility]  Echocardiography Laboratory  [address] [place] [address]        Name: CLYDE RODRIGUEZ  MRN: [id number]  : 1932  Study Date: 2020 01:44 PM  Age: 87 yrs  Gender: Male  Patient Location: [facility]  Reason For Study: SOB  Ordering Physician: MARIA G STOVER  Referring Physician: MARIA G STOVER  Performed By: JORDYN Holland     BSA: 2.0 m2  Height: 70 in  Weight: 182 lb  HR: 69  BP: 119/58 mmHg  _____________________________________________________________________________  __        Procedure  Complete Portable Echo Adult. Optison (NDC #[id number]) given intravenously.  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  The gradient is normal for this prosthetic aortic valve.  Left ventricular systolic function is normal.  The visual ejection fraction is estimated at 55-60%.  There is mild concentric left ventricular hypertrophy.  The left ventricle is normal in size.  The left atrium is mild to moderately dilated.     No significant change since 2018.     _____________________________________________________________________________  __        Left Ventricle  The left ventricle is normal in size. There is mild concentric left  ventricular hypertrophy. Left ventricular systolic function is normal. The  visual ejection fraction is estimated at 55-60%. Grade II or moderate  diastolic dysfunction. No regional wall motion abnormalities noted. There is  no thrombus seen in the left ventricle.     Right Ventricle  The right ventricle is normal in structure, function and size. There is no  mass or thrombus in the right ventricle.     Atria  The left atrium is mild to moderately dilated. Right atrial size is normal.  There is no atrial shunt seen. The left atrial appendage is not  well  visualized.     Mitral Valve  There is moderate mitral annular calcification. There is no mitral  regurgitation noted. There is no mitral valve stenosis.        Tricuspid Valve  Normal tricuspid valve. No tricuspid regurgitation. Right ventricular systolic  pressure could not be approximated due to inadequate tricuspid regurgitation.  There is no tricuspid stenosis.     Aortic Valve  No aortic regurgitation is present. The mean AoV pressure gradient is 12.2  mmHg. 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 The gradient is normal for this  prosthetic aortic valve.     Pulmonic Valve  The pulmonic valve is not well visualized. There is no pulmonic valvular  regurgitation. There is no pulmonic valvular stenosis.     Vessels  The aortic root is normal size. Normal size ascending aorta. Inferior vena  cava not well visualized for estimation of right atrial pressure. The  pulmonary artery is normal size.     Pericardium  The pericardium appears normal. There is no pleural effusion.        Rhythm  Sinus rhythm was noted.  _____________________________________________________________________________  __  MMode/2D Measurements & Calculations  IVSd: 1.3 cm     LVIDd: 4.4 cm  LVIDs: 3.2 cm  LVPWd: 1.4 cm  FS: 26.7 %  LV mass(C)d: 236.7 grams  LV mass(C)dI: 118.0 grams/m2  Ao root diam: 3.1 cm  LA dimension: 4.6 cm  asc Aorta Diam: 3.3 cm  LA/Ao: 1.5  LVOT diam: 2.2 cm  LVOT area: 3.9 cm2  LA Volume (BP): 77.9 ml  LA Volume Indexed (AL/bp): 36.3 ml/m2  RWT: 0.66           Doppler Measurements & Calculations  MV E max earlene: 93.4 cm/sec  MV A max earlene: 65.1 cm/sec  MV E/A: 1.4  MV max P.3 mmHg  MV mean P.5 mmHg  MV V2 VTI: 38.3 cm  MVA(VTI): 2.0 cm2  MV dec time: 0.25 sec  Ao V2 max: 229.3 cm/sec  Ao max P.0 mmHg  Ao V2 mean: 166.7 cm/sec  Ao mean P.2 mmHg  Ao V2 VTI: 50.5 cm  АННА(I,D): 1.5 cm2  АННА(V,D): 1.5 cm2  LV V1 max PG: 3.3 mmHg  LV V1 max: 90.4 cm/sec  LV V1 VTI: 19.4 cm  SV(LVOT): 74.9 ml  SI(LVOT):  37.4 ml/m2  PA acc time: 0.12 sec  AV Santi Ratio (DI): 0.39  АННА Index (cm2/m2): 0.74  E/E' avg: 15.5  Lateral E/e': 8.9  Medial E/e': 22.2              _____________________________________________________________________________  __        Report approved by: Dr. Alexander Moses 03/24/2020 04:33 PM          En Malagon MD  Text Page  (7am to 6pm)

## 2020-03-25 NOTE — PLAN OF CARE
"PT:  Discharge Planner PT   Patient plan for discharge: Return home  Current status: Currently pt requires SBA sit to/from stand with FWW, CGA for gait of 250 ft with FWW with dec in balance noted, weaving gait and antalgic due to R knee pain. Pt endorses feeling unsteady. O2 sats on 2.5L 93% pre and 82% post on 3L. Pt demonstrates SOB, dec strength, balance, activity tolerance and difficulty ambulating and transferring and would benefit from skilled PT services in order to improve this.  Barriers to return to prior living situation: Falls risk, needs assist with mobility, new O2 need, needing a more supportive AD than baseline  Recommendations for discharge: TCU  Rationale for recommendations: Pt is currently refusing TCU and states he plans to return home, will \"think about\" having home PT. Pt would benefit from continued PT while admitted and at discharge to improve strength, balance, mobility to increase independence, reduce falls risk before returning home. If pt continues to refuse TCU, recommend discharge to home with home PT and use of FWW, supervision for stairs.       Entered by: Lin Qureshi 03/25/2020 2:15 PM       "

## 2020-03-25 NOTE — PLAN OF CARE
DATE & TIME: 3/25 AM                   Cognitive Concerns/ Orientation : A&Ox4; forgetful   BEHAVIOR & AGGRESSION TOOL COLOR: green  CIWA SCORE: na      ABNL VS/O2: VSS on 4L NC, was on 2L for awhile today but did have to be turned back up  MOBILITY: Ax1 with gb and walker, independently repositioning in bed   PAIN MANAGMENT: denied on shift   DIET: [facility] soft   BOWEL/BLADDER: continent of B/B, medium BM on shift no signs of bleeding - using urinal at bedside, good output  ABNL LAB/BG: see lab results  DRAIN/DEVICES: PIV SL  TELEMETRY RHYTHM: NSR, now discontinued  SKIN: Skin tear on L arm, mepilex CDI - WOC consulted. Intact, scattered bruising.   TESTS/PROCEDURES: Echo 3/24 no new changes since last echo in 2018  D/C DAY/GOALS/PLACE: 2-3 days pending progress  OTHER IMPORTANT INFO: BLE neuropathy.

## 2020-03-25 NOTE — PROGRESS NOTES
DATE & TIME: 3/24 2526-4845    Cognitive Concerns/ Orientation : A&Ox4; forgetful   BEHAVIOR & AGGRESSION TOOL COLOR: green  CIWA SCORE: na   ABNL VS/O2: VSS on 4L NC  MOBILITY: Ax1 with gb and walker, independently repositioning in bed   PAIN MANAGMENT: denied on shift   DIET: [facility] soft   BOWEL/BLADDER: continent of B/B, no BM on shift - using urinal at bedside, good output  ABNL LAB/BG: WBC 19.0,  Hgb 10.4,  Plt 145, Procal 54.51, INR 1.28  DRAIN/DEVICES: PIV SL  TELEMETRY RHYTHM: NSR  SKIN: Skin tear on L arm, mepilex CDI - WOC consulted. Intact, scattered bruising.   TESTS/PROCEDURES: Echo 3/24 no new changes since last echo in 2018  D/C DAY/GOALS/PLACE: 2-3 days pending progress  OTHER IMPORTANT INFO: BLE neuropathy. PT/OT consulted. GI following.

## 2020-03-26 ENCOUNTER — APPOINTMENT (OUTPATIENT)
Dept: GENERAL RADIOLOGY | Facility: CLINIC | Age: 85
DRG: 871 | End: 2020-03-26
Attending: INTERNAL MEDICINE
Payer: COMMERCIAL

## 2020-03-26 ENCOUNTER — APPOINTMENT (OUTPATIENT)
Dept: PHYSICAL THERAPY | Facility: CLINIC | Age: 85
DRG: 871 | End: 2020-03-26
Payer: COMMERCIAL

## 2020-03-26 ENCOUNTER — APPOINTMENT (OUTPATIENT)
Dept: OCCUPATIONAL THERAPY | Facility: CLINIC | Age: 85
DRG: 871 | End: 2020-03-26
Payer: COMMERCIAL

## 2020-03-26 LAB
ANION GAP SERPL CALCULATED.3IONS-SCNC: 4 MMOL/L (ref 3–14)
BUN SERPL-MCNC: 20 MG/DL (ref 7–30)
CALCIUM SERPL-MCNC: 8.5 MG/DL (ref 8.5–10.1)
CHLORIDE SERPL-SCNC: 106 MMOL/L (ref 94–109)
CO2 SERPL-SCNC: 27 MMOL/L (ref 20–32)
CREAT SERPL-MCNC: 0.93 MG/DL (ref 0.66–1.25)
ERYTHROCYTE [DISTWIDTH] IN BLOOD BY AUTOMATED COUNT: 14.1 % (ref 10–15)
GFR SERPL CREATININE-BSD FRML MDRD: 74 ML/MIN/{1.73_M2}
GLUCOSE SERPL-MCNC: 106 MG/DL (ref 70–99)
HCT VFR BLD AUTO: 37.5 % (ref 40–53)
HGB BLD-MCNC: 11.6 G/DL (ref 13.3–17.7)
LACTATE BLD-SCNC: 0.9 MMOL/L (ref 0.7–2)
MCH RBC QN AUTO: 27 PG (ref 26.5–33)
MCHC RBC AUTO-ENTMCNC: 30.9 G/DL (ref 31.5–36.5)
MCV RBC AUTO: 87 FL (ref 78–100)
PLATELET # BLD AUTO: 142 10E9/L (ref 150–450)
POTASSIUM SERPL-SCNC: 4.3 MMOL/L (ref 3.4–5.3)
PROCALCITONIN SERPL-MCNC: 11.41 NG/ML
RBC # BLD AUTO: 4.29 10E12/L (ref 4.4–5.9)
SODIUM SERPL-SCNC: 137 MMOL/L (ref 133–144)
WBC # BLD AUTO: 9.7 10E9/L (ref 4–11)

## 2020-03-26 PROCEDURE — 25000132 ZZH RX MED GY IP 250 OP 250 PS 637: Performed by: HOSPITALIST

## 2020-03-26 PROCEDURE — 85027 COMPLETE CBC AUTOMATED: CPT | Performed by: INTERNAL MEDICINE

## 2020-03-26 PROCEDURE — 25000128 H RX IP 250 OP 636: Performed by: HOSPITALIST

## 2020-03-26 PROCEDURE — 99232 SBSQ HOSP IP/OBS MODERATE 35: CPT | Performed by: INTERNAL MEDICINE

## 2020-03-26 PROCEDURE — 71045 X-RAY EXAM CHEST 1 VIEW: CPT

## 2020-03-26 PROCEDURE — 25800030 ZZH RX IP 258 OP 636: Performed by: INTERNAL MEDICINE

## 2020-03-26 PROCEDURE — 25000128 H RX IP 250 OP 636: Performed by: INTERNAL MEDICINE

## 2020-03-26 PROCEDURE — 25000131 ZZH RX MED GY IP 250 OP 636 PS 637: Performed by: INTERNAL MEDICINE

## 2020-03-26 PROCEDURE — 12000000 ZZH R&B MED SURG/OB

## 2020-03-26 PROCEDURE — 99207 ZZC CDG-MDM COMPONENT: MEETS LOW - DOWN CODED: CPT | Performed by: INTERNAL MEDICINE

## 2020-03-26 PROCEDURE — 97530 THERAPEUTIC ACTIVITIES: CPT | Mod: GP

## 2020-03-26 PROCEDURE — C9113 INJ PANTOPRAZOLE SODIUM, VIA: HCPCS | Performed by: HOSPITALIST

## 2020-03-26 PROCEDURE — 97116 GAIT TRAINING THERAPY: CPT | Mod: GP

## 2020-03-26 PROCEDURE — 25000132 ZZH RX MED GY IP 250 OP 250 PS 637: Performed by: INTERNAL MEDICINE

## 2020-03-26 PROCEDURE — 25000125 ZZHC RX 250: Performed by: INTERNAL MEDICINE

## 2020-03-26 PROCEDURE — 97535 SELF CARE MNGMENT TRAINING: CPT | Mod: GO | Performed by: OCCUPATIONAL THERAPY ASSISTANT

## 2020-03-26 PROCEDURE — 80048 BASIC METABOLIC PNL TOTAL CA: CPT | Performed by: INTERNAL MEDICINE

## 2020-03-26 PROCEDURE — 84145 PROCALCITONIN (PCT): CPT | Performed by: INTERNAL MEDICINE

## 2020-03-26 PROCEDURE — 25000125 ZZHC RX 250: Performed by: HOSPITALIST

## 2020-03-26 PROCEDURE — 36415 COLL VENOUS BLD VENIPUNCTURE: CPT | Performed by: INTERNAL MEDICINE

## 2020-03-26 PROCEDURE — 83605 ASSAY OF LACTIC ACID: CPT | Performed by: INTERNAL MEDICINE

## 2020-03-26 RX ORDER — CEFEPIME HYDROCHLORIDE 1 G/1
1 INJECTION, POWDER, FOR SOLUTION INTRAMUSCULAR; INTRAVENOUS EVERY 24 HOURS
Status: DISCONTINUED | OUTPATIENT
Start: 2020-03-26 | End: 2020-03-28

## 2020-03-26 RX ORDER — PANTOPRAZOLE SODIUM 40 MG/1
40 TABLET, DELAYED RELEASE ORAL
Status: DISCONTINUED | OUTPATIENT
Start: 2020-03-26 | End: 2020-04-02 | Stop reason: HOSPADM

## 2020-03-26 RX ORDER — PREDNISONE 20 MG/1
20 TABLET ORAL DAILY
Status: DISCONTINUED | OUTPATIENT
Start: 2020-03-26 | End: 2020-04-02 | Stop reason: HOSPADM

## 2020-03-26 RX ORDER — LEVOFLOXACIN 500 MG/1
500 TABLET, FILM COATED ORAL DAILY
Status: DISCONTINUED | OUTPATIENT
Start: 2020-03-26 | End: 2020-03-26

## 2020-03-26 RX ORDER — IPRATROPIUM BROMIDE AND ALBUTEROL SULFATE 2.5; .5 MG/3ML; MG/3ML
3 SOLUTION RESPIRATORY (INHALATION)
Status: DISCONTINUED | OUTPATIENT
Start: 2020-03-26 | End: 2020-04-02 | Stop reason: HOSPADM

## 2020-03-26 RX ORDER — CLINDAMYCIN PHOSPHATE 900 MG/50ML
900 INJECTION, SOLUTION INTRAVENOUS EVERY 8 HOURS
Status: DISCONTINUED | OUTPATIENT
Start: 2020-03-26 | End: 2020-03-28

## 2020-03-26 RX ADMIN — CLINDAMYCIN PHOSPHATE 900 MG: 900 INJECTION, SOLUTION INTRAVENOUS at 20:20

## 2020-03-26 RX ADMIN — CLINDAMYCIN PHOSPHATE 900 MG: 900 INJECTION, SOLUTION INTRAVENOUS at 14:58

## 2020-03-26 RX ADMIN — IPRATROPIUM BROMIDE AND ALBUTEROL SULFATE 3 ML: .5; 3 SOLUTION RESPIRATORY (INHALATION) at 13:01

## 2020-03-26 RX ADMIN — PREDNISONE 20 MG: 20 TABLET ORAL at 12:41

## 2020-03-26 RX ADMIN — IPRATROPIUM BROMIDE AND ALBUTEROL SULFATE 3 ML: .5; 3 SOLUTION RESPIRATORY (INHALATION) at 20:20

## 2020-03-26 RX ADMIN — GABAPENTIN 300 MG: 300 CAPSULE ORAL at 08:22

## 2020-03-26 RX ADMIN — UMECLIDINIUM 1 PUFF: 62.5 AEROSOL, POWDER ORAL at 08:22

## 2020-03-26 RX ADMIN — SODIUM CHLORIDE 500 ML: 9 INJECTION, SOLUTION INTRAVENOUS at 12:34

## 2020-03-26 RX ADMIN — PANTOPRAZOLE SODIUM 40 MG: 40 INJECTION, POWDER, FOR SOLUTION INTRAVENOUS at 08:22

## 2020-03-26 RX ADMIN — GABAPENTIN 900 MG: 300 CAPSULE ORAL at 20:21

## 2020-03-26 RX ADMIN — ATORVASTATIN CALCIUM 10 MG: 10 TABLET, FILM COATED ORAL at 08:22

## 2020-03-26 RX ADMIN — METOPROLOL TARTRATE 12.5 MG: 25 TABLET, FILM COATED ORAL at 20:21

## 2020-03-26 RX ADMIN — CLINDAMYCIN PHOSPHATE 600 MG: 600 INJECTION, SOLUTION INTRAVENOUS at 05:22

## 2020-03-26 RX ADMIN — PANTOPRAZOLE SODIUM 40 MG: 40 TABLET, DELAYED RELEASE ORAL at 17:23

## 2020-03-26 RX ADMIN — CEFEPIME HYDROCHLORIDE 1 G: 1 INJECTION, POWDER, FOR SOLUTION INTRAMUSCULAR; INTRAVENOUS at 12:38

## 2020-03-26 ASSESSMENT — ACTIVITIES OF DAILY LIVING (ADL)
ADLS_ACUITY_SCORE: 17

## 2020-03-26 ASSESSMENT — MIFFLIN-ST. JEOR: SCORE: 1513.6

## 2020-03-26 NOTE — PLAN OF CARE
Cognitive Concerns/ Orientation : A&Ox4; forgetful   BEHAVIOR & AGGRESSION TOOL COLOR: green  ABNL VS/O2: increase oxygen to 8 L Oxymask then weaned back down to 5 L NC by 1300.   MOBILITY: Ax1 with gb and walker, up in the chair, encouraging shifting eight while in bed  PAIN MANAGMENT: denies  DIET: regular-good appetite  BOWEL/BLADDER: continent of B/B, no BM on shift - using urinal at bedside, good output  ABNL LAB/BG: WBC 9, Hgb 11.6, Procal 11, lactic acid 0.9  DRAIN/DEVICES: PIV SL  TELEMETRY RHYTHM: ST  SKIN: Skin tear on L arm, mepilex CDI - WOC consulted. Intact, scattered bruising.   TESTS/PROCEDURES: none  D/C DAY/GOALS/PLACE: 2 days pending progress  OTHER IMPORTANT INFO: BLE edema. Diminished lung sounds. Occasional productive cough. LAROSE. Started prednisone, nebs, changed abx and gave 500c bolus.

## 2020-03-26 NOTE — PLAN OF CARE
Discharge Planner OT   Patient plan for discharge: Home   Current status: pt completed supine to sit EOB SBA, Yodit sit to stand, amb with FWW CGA/Yodit to/from bathroom, toilet transfer with grab bars min/mod A as toilet is lower. At sink ADLS CGA. Pt on 6L with pt destat to 78% asymptomatic, ~ 1 min recovery to >90% with PLB.  Completed cognitive assessment SLUMS with pt score of 11/30 indicates dementia level, impairments noted with orientation, STM, attention and executive functions.   Barriers to return to prior living situation: Current level of A for I/ADLs and functional mobility   Recommendations for discharge: TCU per plan established by the Occupational Therapist  Rationale for recommendations: Pt is below baseline in I/ADLs and functional mobility. Pt will benefit from skilled therapy to address safety and independence in I/ADLs. If pt returns home, pt will need increased assist in all I/ADLs and home OT.        Entered by: Radha Almendarez 03/26/2020 8:27 AM

## 2020-03-26 NOTE — PROVIDER NOTIFICATION
Paged Dr Malagon regarding an update on blood cultures, results are showing a staph species. Needing increased oxygen needs, Continue to monitor.

## 2020-03-26 NOTE — PROVIDER NOTIFICATION
MD Notification    Notified Person: MD    Notified Person Name: Dr Stephen Garcia     Notification Date/Time: 03/26 at 0311 am     Notification Interaction: Text [paged and spoke with Dr Mitchell via phone.     Purpose of Notification: Blood culture on 3/22 at 1914 from R arm Gram + Cocci in cluster.     Orders Received: No new order recommended at this time.     Comments: Pt currently receiving Clindamycin and Cefepime that will ganna cover.

## 2020-03-26 NOTE — PROGRESS NOTES
WOC Focused Assessment    S: WOC consulted for wound to L elbow/forearm    B: Hermilo Velasquez is a 87 year old male admitted to the intensive care unit on 3/22/2020 with septic vs hypovolemic shock likely secondary to hematemesis and aspiration pneumonia    A: Pt has a partial thickness wound from skin tear to L elbow. Wound with no remaining skin flap and lots of dried crusty serosang drainage crusted onto the wound edges. Pt reports he  Just wants to leave it open so has been taking it bandage off. WOC educated on benefits of keeping area covered to promote faster healing. Pt has 5.3cm x 2.2cm x 0.1cm area, wound base with pink moist tissue significant adherent crusting at edges. No acute signs or symptoms of infection. Will use polymem to help clean area up and promote healing.    R:  Wound care to R Elbow: Every other day (odd days)  1. Cleanse with saline or cleanser  2. Apply Polymem foam to wound bed  3. Time/Date/Initial       WOC nurse signing off      Cristhian MOORE

## 2020-03-26 NOTE — PLAN OF CARE
DATE & TIME: 3/25 from 1900 to 0730    Cognitive Concerns/ Orientation : A&O x 4, forgetful  BEHAVIOR & AGGRESSION TOOL COLOR: Green, calm and cooperative for cares  CIWA SCORE: N/A   ABNL VS/O2: VSS on 6 litters oxymask saturating low 90's , at times dips to 88%   MOBILITY: Up x 1 assist with walker/GB  PAIN MANAGMENT: Denied  DIET: Mechanical soft diet  BOWEL/BLADDER: Continent, uses urinal at bedside, voiding well  ABNL LAB/BG: Prolactin 32.08, , WBC 13.1, Hgb 10.6, Platelet 134, INR 1.28, Coumadin on hold.   DRAIN/DEVICES: PIV saline lock, intermittent IV abx  TELEMETRY RHYTHM: N/A  SKIN: Red/Isak with scattered bruising, abrasion/skin tear on on left elbow, dressing CDI. WOC consulted   TESTS/PROCEDURES: N/A  D/C DAY/GOALS/PLACE: Discharge 2 - 3 days pending in progress for weaning oxygen and respiratory status improvement.   OTHER IMPORTANT INFO: Lung sound diminished throughout, LAROSE, unable to wean oxygen. BLE neuropathy. Hgb 10.6, No sign of bleeding observed.

## 2020-03-26 NOTE — PROGRESS NOTES
Critical lab result taken for primary RN. Procalcitonin level 11.41. Dr. Malagon at nursing station and verbally notified of result. Primary RN updated.

## 2020-03-26 NOTE — PROGRESS NOTES
[facility]  Hospitalist Progress Note  En Malagon MD  03/26/2020    Assessment & Plan   Hermilo Velasquez is a 87 year old male admitted to the intensive care unit on 3/22/2020 with septic vs hypovolemic shock likely secondary to hematemesis and aspiration pneumonia.     Hypovolemic shock   Soft blood pressures   Lactic acidosis   -- Resolved with intravenous fluid and packed red blood cells  -- stable bp and not tachycardic  -- will give NS bolus 500 cc     Hematemesis due to acute upper gastrointestinal hemorrhage in the setting of anticoagulation with warfarin   Acute blood loss anemia   History of gastroesophageal reflux disease, gastric and duodenal angiectasias   Patient's wife found him in bed covered in dark emesis with coffee-ground appearance and also had diarrhea.  Hemoglobin 12.8 in ED.  Patient was given 1 unit PRBC in ED.  He also received vitamin K and intravenous proton pump inhibitor.  Of note he had a upper endoscopy July 2018 with normal esophagus, a few 1 to 2 mm nonbleeding angiectasias in the stomach as well as two 2 to 3 mm angiectasias in the second portion of the duodenum.  Colonoscopy at that time also showed non-bleeding angioid ectasias and several polyps.  Hemoglobin down 1 gram overnight but no overt bleeding.    Advance to regular diet    Change to po proton pump inhibitor bid for 4 weeks than daily, prn H2 blocker    Monitor hemoglobin, stabilized and improving 10.6 >> 11.6    INR 1.28, will repeat    GI Deferring esophagogastroduodenoscopy if patient's GI bleed improving.     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  Respiratory status is stable. Pro-calcitonin was 85 and is down to 50's today.  He has no fever but remains on supplemental oxygen.    Wean oxygen as able, his oxygen requirement increased to 8L, will  check CXR    Was treated with intravenous cefepime and clindamycin (PCN allergy) for past 4 days, will consider discontinuing tomorrow    Blood cultures 1/2 CNS    Inhaled bronchodilators as needed; continue umeclidinium    Start prednisone, scheduled nebs,       Acute kidney injury     Remove Suazo    Creatinine improved 2 > 1.1.6 > 0.94     NSTEMI, likely type 2 - demand ischemia  Hypertension on metoprolol succinate prior to admission   Patient denies any recent chest pain, palpitations, or shortness of breath however he is a poor historian.  Per his wife he had been feeling at baseline up until this morning.  He reports compliance with his home medications.  Initial troponin 0.58, EKG without signs of overt ischemia.    Transthoracic echocardiogram shows normal LVEF 55-60%, milld LVH, mild LAE    Continue bid metoprolol, will decrease dose with soft bp    No aspirin due to upper gastrointestinal hemorrhage      Paroxysmal atrial fibrillation on warfarin    Continue to hold warfarin temporarily    would not resume unless we have an endoscopic diagnosis, he has hx of AVM    INR 1.29     Hyperlipidemia    Continue PTA statin     Chronic pruritus    PTA Atarax       History of lung cancer and NHL in remission      Diet: advance to regular  DVT Prophylaxis: Pneumatic Compression Devices  Suazo Catheter: in place, indication: Retention  Code Status: Full Code        Disposition Plan  -- therapies working, recommending TCU, but patient wants to go home with wife and refusing home care.  -- anticipate on 3/28      Interval History   -- noted softer blood pressures  -- increased oxygen requirements  -- prior CT of chest abd pelvis reviewed    -Data reviewed today: I reviewed all new labs and imaging over the last 24 hours. I personally reviewed no images or EKG's today.    Physical Exam   Heart Rate: 105, Blood pressure (!) 88/52, pulse 83, temperature 98  F (36.7  C), temperature source Axillary, resp. rate 18, height  "1.778 m (5' 10\"), weight 83.2 kg (183 lb 8 oz), SpO2 92 %.  Vitals:    03/24/20 0558 03/25/20 0615 03/26/20 0600   Weight: 82.8 kg (182 lb 8.7 oz) 83.5 kg (184 lb 1.4 oz) 83.2 kg (183 lb 8 oz)     Vital Signs with Ranges  Temp:  [97.9  F (36.6  C)-98  F (36.7  C)] 98  F (36.7  C)  Pulse:  [83] 83  Heart Rate:  [] 105  Resp:  [18] 18  BP: ()/(47-68) 88/52  SpO2:  [86 %-92 %] 92 %  I/O's Last 24 hours  I/O last 3 completed shifts:  In: 480 [P.O.:480]  Out: 1275 [Urine:1275]    Constitutional: Awake, alert, cooperative, no apparent distress  Respiratory: Clear to auscultation bilaterally, no crackles or wheezing  Cardiovascular: Regular rate and rhythm, normal S1 and S2, and no murmur noted  GI: Normal bowel sounds, soft, non-distended, non-tender  Skin/Integumen: No rashes, no cyanosis, no edema  Other:      Medications   All medications were reviewed.      atorvastatin  10 mg Oral Daily     gabapentin  300 mg Oral QAM     gabapentin  900 mg Oral At Bedtime     ipratropium - albuterol 0.5 mg/2.5 mg/3 mL  3 mL Nebulization 4x daily     levofloxacin  500 mg Oral Daily     metoprolol tartrate  12.5 mg Oral BID     pantoprazole  40 mg Oral BID AC     predniSONE  20 mg Oral Daily     umeclidinium  1 puff Inhalation Daily        Data   Recent Labs   Lab 03/26/20  0812 03/25/20  0740 03/24/20  1201 03/24/20  0854 03/23/20  0459  03/22/20  1914   WBC 9.7 13.1*  --  19.0* 28.1*  --  32.1*   HGB 11.6* 10.6*  --  10.4* 11.6*   < > 12.8*   MCV 87 88  --  87 87  --  89   * 134*  --  145* 165  --  214   INR  --   --  1.28*  --  1.68*  --  1.93*    138  --  143 139  --  138   POTASSIUM 4.3 4.3  --  4.4 5.0  --  4.7   CHLORIDE 106 109  --  112* 111*  --  103   CO2 27 27  --  26 23  --  26   BUN 20 27  --  32* 42*  --  33*   CR 0.93 0.94  --  1.16 2.03*  --  2.57*   ANIONGAP 4 2*  --  5 5  --  9   AMRITA 8.5 8.2*  --  7.8* 7.5*  --  8.4*   * 114*  --  104* 148*  --  147*   ALBUMIN  --   --   --   --  2.2*  " --  2.6*   PROTTOTAL  --   --   --   --  5.4*  --  6.2*   BILITOTAL  --   --   --   --  0.6  --  0.6   ALKPHOS  --   --   --   --  55  --  80   ALT  --   --   --   --  30  --  27   AST  --   --   --   --  126*  --  101*   LIPASE  --   --   --   --   --   --  54*   TROPI  --   --   --   --  0.738*  --  0.580*    < > = values in this interval not displayed.       No results found for this or any previous visit (from the past 24 hour(s)).    En Malagon MD  Text Page  (7am to 6pm)

## 2020-03-26 NOTE — PROGRESS NOTES
Antimicrobial Stewardship Team Note    Antimicrobial Stewardship Program - A joint venture between [facility] and [facility] to optimize antibiotic management.     Patient: Hermilo Velasquez  MRN: [id number]  Allergies: Lidocaine; Omeprazole; Pantoprazole; Prevacid [lansoprazole]; Lasix [furosemide]; and Penicillins    Brief Summary: 87 year old male admitted to the intensive care unit on 3/22/2020 with septic vs hypovolemic shock likely secondary to hematemesis and aspiration pneumonia.    Likely aspiration pneumonia, bilateral, rule out COVID-19  Acute respiratory failure, hypoxic   Chronic obstructive lung disease   COVID PCR negative.  Respiratory status is stable. Pro-calcitonin was 85 and is down to 30's.  He has no fever but remains on supplemental oxygen.  Wean oxygen as able   On intravenous cefepime and clindamycin (PCN allergy)       Active Anti-infective Medications   (From admission, onward)                Start     Stop    03/22/20   ceFEPIme  1 g,   Intravenous,   EVERY 24 HOURS     Aspiration Pneumonia, Sepsis        --    03/22/20   clindamycin  600 mg,   Intravenous,   100 mL/hr,   EVERY 8 HOURS     Aspiration Pneumonia        --          Assessment: Consider discontinuation of all antibiotics (cefepime and clindamycin).  Today is day 5 of therapy and would consider treatment course completed for a potential aspiration pneumonia.    Recommendations:  Medication Change: discontinue one or more antibiotic(s) -  .cefepime and clindamycin.  Consult Recommendation:     Recommended labs:     Other Recommendation(s): define duration of therapy -      Pharmacy took the following actions: Sticky note reminder created, Electronic note created.    Discussed with ID Staff Dr. Carmelo Kapoor, PharmD, BCPS    Vital Signs/Clinical Features:  Vitals         03/24 0700  -  03/25 0659 03/25 0700  -  03/26 0659 03/26 0700  -  03/26 1209   Most Recent    Temp ( F)  97.8 -  97.9    97.4 -  98    97.9 -  98     98 (36.7)    Pulse   77      83       83    Heart Rate 69 -  82    58 -  83    54 -  110     95    Resp 16 -  18      18      18     18    /49 -  120/66    106/56 -  145/68    82/47 -  96/58     96/58    SpO2 (%) 86 -  94    85 -  92    89 -  93     93            Labs  Estimated Creatinine Clearance: 65.9 mL/min (based on SCr of 0.93 mg/dL).  Recent Labs   Lab Test 12/25/19  0757 03/22/20  1914 03/23/20  0459 03/24/20  0854 03/25/20  0740 03/26/20  0812   CR 0.82 2.57* 2.03* 1.16 0.94 0.93       Recent Labs   Lab Test 07/31/18  1104  05/13/19  1128  12/22/19  1342  12/25/19  0757 03/22/20  1914 03/22/20  2328 03/23/20  0459 03/24/20  0854 03/25/20  0740 03/26/20  0812   WBC 11.6*   < > 12.4*   < > 24.7*   < > 11.1* 32.1*  --  28.1* 19.0* 13.1* 9.7   ANEU 9.2*  --  9.6*  --  21.4*  --   --  27.9*  --   --  17.0* 10.8*  --    ALYM 1.0  --  1.3  --  0.7*  --   --  0.6*  --   --  0.5* 0.6*  --    KODY 1.3  --  1.3  --  2.2*  --   --  1.3  --   --  1.4* 1.1  --    AEOS 0.1  --  0.3  --  0.4  --   --  0.0  --   --  0.0 0.6  --    HGB 7.5*   < > 12.1*   < > 11.9*   < > 11.1* 12.8* 12.3* 11.6* 10.4* 10.6* 11.6*   HCT 25.3*   < > 37.6*   < > 37.7*   < > 34.9* 41.3  --  36.8* 32.9* 33.8* 37.5*   MCV 67*   < > 86   < > 91   < > 90 89  --  87 87 88 87      < > 259   < > 177   < > 174 214  --  165 145* 134* 142*    < > = values in this interval not displayed.       Recent Labs   Lab Test 08/10/17  1220 07/31/18  1104 03/01/19  0928 12/22/19  1342 03/22/20  1914 03/23/20  0459   BILITOTAL 0.4 0.3 0.2 0.7 0.6 0.6   ALKPHOS 91 65 91 86 80 55   ALBUMIN 3.1* 2.6* 3.1* 2.8* 2.6* 2.2*   AST 16 11 20 11 101* 126*   ALT 16 14 15 11 27 30       Recent Labs   Lab Test 11/17/15  0538  11/24/15  2225 02/21/18  1124 01/10/19  1731 05/13/19  1128 05/14/19  1021 05/15/19  0700 05/16/19  0641 12/22/19  1339 03/22/20  1914 03/22/20  2055 03/22/20  2328 03/23/20  0459 03/24/20  0854  03/25/20  0740   PCAL 5.24  --   --   --   --   --   --   --   --   --   --   --  84.35*  --  54.51* 32.08*   LACT  --    < > 0.6  --   --  0.9  --   --   --  0.8 8.1* 3.8*  --  1.1  --   --    CRP  --   --   --  12.0*  --   --  182.0* 184.0* 127.0*  --   --   --   --   --   --   --    SED  --   --   --  32* 33*  --  68* 83* 89*  --   --   --   --   --   --   --     < > = values in this interval not displayed.       Recent Labs   Lab Test 11/24/15  1036   VANCOMYCIN 19.8       Culture Results:  7-Day Micro Results       Procedure Component Value Units Date/Time    Methicillin Resist/Sens S. aureus PCR [[id number]] Collected:  03/23/20 0033    Order Status:  Completed Lab Status:  Final result Updated:  03/23/20 0346    Specimen:  Nares      Specimen Description Nares     Methicillin Resist/Sens S. aureus PCR Negative     Comment: MRSA Negative: SA Negative  MRSA and Staphylococcus aureus target DNA not   detected, presumed negative for MRSA and SA colonization or the number of   bacteria present may be below the limit of detection for the assay. FDA   approved assay performed using [employer] GeneXpert(R) real-time PCR.         Urine Culture [[id number]] Collected:  03/22/20 1950    Order Status:  Completed Lab Status:  Final result Updated:  03/23/20 2156    Specimen:  Catheterized Urine      Specimen Description Catheterized Urine     Special Requests Specimen received in preservative     Culture Micro No growth    COVID-19 Virus (Coronavirus) PCR to [place] [facility] Nasopharyngeal [[id number]] Collected:  03/22/20 1943    Order Status:  Completed Lab Status:  Final result Updated:  03/24/20 0648    Specimen:  Nasopharyngeal      Lab Scanned Result COVID-19 VIRUS PCR-Scanned    Influenza A & B Antigen [[id number]] Collected:  03/22/20 1943    Order Status:  Completed Lab Status:  Edited Result - FINAL Updated:  03/22/20 2019    Specimen:  Nasopharyngeal      Influenza A/B Agn Specimen Nasopharyngeal     Influenza A Negative      Influenza B Negative     Comment: Test results must be correlated with clinical data. If necessary, results   should be confirmed by a molecular assay or viral culture.         Blood culture [[id number]]  (Abnormal) Collected:  03/22/20 1941    Order Status:  Completed Lab Status:  Preliminary result Updated:  03/26/20 0727    Specimen:  Blood      Specimen Description Blood Right Arm     Culture Micro Cultured on the 4th day of incubation:  Gram positive cocci in clusters        Critical Value/Significant Value, preliminary result only, called to and read back by  ANNABELLE CABRERA RN  03.26.20 CF        (Note)  POSITIVE for Staphylococci other than S.aureus, S.epidermidis and  S.lugdunensis, by [employer] multiplex nucleic acid test.  Coagulase-negative staphylococci are the most common venipuncture or  collection associated skin CONTAMINANTS grown in blood cultures.  Final identification and antimicrobial susceptibility testing will be  verified by standard methods.    Specimen tested with Verigene multiplex, gram-positive blood culture  nucleic acid test for the following targets: Staph aureus, Staph  epidermidis, Staph lugdunensis, other Staph species, Enterococcus  faecalis, Enterococcus faecium, Streptococcus species, S. agalactiae,  S. anginosus grp., S. pneumoniae, S. pyogenes, Listeria sp., mecA  (methicillin resistance) and Samanta/B (vancomycin resistance).    Critical Value/Significant Value called to and read back by Doreen Silverman RN on 3.26.20 at 0727.       Blood culture [[id number]] Collected:  03/1935    Order Status:  Completed Lab Status:  Preliminary result Updated:  03/26/20 0042    Specimen:  Blood      Specimen Description Blood Left Arm     Culture Micro No growth after 4 days            Recent Labs   Lab Test 05/13/14  1006 07/30/15  1545 09/03/15  0550 12/22/19  1614 03/22/20  1950   URINEPH 6.0 5.5 5.5 5.5 6.0   NITRITE Negative Negative Negative Negative Negative   LEUKEST Negative  Negative Negative Negative Negative   WBCU O - 2 1 2 <1 <1                         Imaging: Echocardiogram Complete    Result Date: 3/24/2020  [id number]^CK^MARIA G^D    [facility] [address] [place] [address]   Name: CLYDE RODRIGUEZ MRN: [id number] : 1932 Study Date: 2020 01:44 PM Age: 87 yrs Gender: Male Patient Location: [facility] Reason For Study: SOB Ordering Physician: MARIA G STOVER Referring Physician: MARIA G STOVER Performed By: MORIS Holland  BSA: 2.0 m2 Height: 70 in Weight: 182 lb HR: 69 BP: 119/58 mmHg _____________________________________________________________________________ __   Procedure Complete Portable Echo Adult. Optison (NDC #[id number]) given intravenously. 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 The gradient is normal for this prosthetic aortic valve. Left ventricular systolic function is normal. The visual ejection fraction is estimated at 55-60%. There is mild concentric left ventricular hypertrophy. The left ventricle is normal in size. The left atrium is mild to moderately dilated.  No significant change since 2018.  _____________________________________________________________________________ __   Left Ventricle The left ventricle is normal in size. There is mild concentric left ventricular hypertrophy. Left ventricular systolic function is normal. The visual ejection fraction is estimated at 55-60%. Grade II or moderate diastolic dysfunction. No regional wall motion abnormalities noted. There is no thrombus seen in the left ventricle.  Right Ventricle The right ventricle is normal in structure, function and size. There is no mass or thrombus in the right ventricle.  Atria The left atrium is mild to moderately dilated. Right atrial size is normal. There is no  atrial shunt seen. The left atrial appendage is not well visualized.  Mitral Valve There is moderate mitral annular calcification. There is no mitral regurgitation noted. There is no mitral valve stenosis.   Tricuspid Valve Normal tricuspid valve. No tricuspid regurgitation. Right ventricular systolic pressure could not be approximated due to inadequate tricuspid regurgitation. There is no tricuspid stenosis.  Aortic Valve No aortic regurgitation is present. The mean AoV pressure gradient is 12.2 mmHg. 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 The gradient is normal for this prosthetic aortic valve.  Pulmonic Valve The pulmonic valve is not well visualized. There is no pulmonic valvular regurgitation. There is no pulmonic valvular stenosis.  Vessels The aortic root is normal size. Normal size ascending aorta. Inferior vena cava not well visualized for estimation of right atrial pressure. The pulmonary artery is normal size.  Pericardium The pericardium appears normal. There is no pleural effusion.   Rhythm Sinus rhythm was noted. _____________________________________________________________________________ __ MMode/2D Measurements & Calculations IVSd: 1.3 cm  LVIDd: 4.4 cm LVIDs: 3.2 cm LVPWd: 1.4 cm FS: 26.7 % LV mass(C)d: 236.7 grams LV mass(C)dI: 118.0 grams/m2 Ao root diam: 3.1 cm LA dimension: 4.6 cm asc Aorta Diam: 3.3 cm LA/Ao: 1.5 LVOT diam: 2.2 cm LVOT area: 3.9 cm2 LA Volume (BP): 77.9 ml LA Volume Indexed (AL/bp): 36.3 ml/m2 RWT: 0.66    Doppler Measurements & Calculations MV E max santi: 93.4 cm/sec MV A max santi: 65.1 cm/sec MV E/A: 1.4 MV max P.3 mmHg MV mean P.5 mmHg MV V2 VTI: 38.3 cm MVA(VTI): 2.0 cm2 MV dec time: 0.25 sec Ao V2 max: 229.3 cm/sec Ao max P.0 mmHg Ao V2 mean: 166.7 cm/sec Ao mean P.2 mmHg Ao V2 VTI: 50.5 cm АННА(I,D): 1.5 cm2 АННА(V,D): 1.5 cm2 LV V1 max PG: 3.3 mmHg LV V1 max: 90.4 cm/sec LV V1 VTI: 19.4 cm SV(LVOT): 74.9 ml SI(LVOT): 37.4 ml/m2 PA acc time: 0.12 sec AV Santi  Ratio (DI): 0.39 АННА Index (cm2/m2): 0.74 E/E' avg: 15.5 Lateral E/e': 8.9 Medial E/e': 22.2     _____________________________________________________________________________ __   Report approved by: Dr. Alexander Moses 03/24/2020 04:33 PM      Ct Chest Abdomen Pelvis W/o Contrast    Result Date: 3/22/2020  CT CHEST ABDOMEN PELVIS W/O CONTRAST 3/22/2020 8:09 PM CLINICAL HISTORY: Sepsis TECHNIQUE: CT scan of the chest, abdomen, and pelvis was performed without IV contrast. Multiplanar reformats were obtained. Dose reduction techniques were used. CONTRAST: None. COMPARISON: CT chest 3/4/2019, 3/25/2016 FINDINGS: Evaluation is somewhat limited due to motion artifact. LUNGS AND PLEURA: Nonocclusive secretions in the trachea. Subsegmental mucus impaction in the left lower lobe and lingula. There are postsurgical changes of a peripheral right lower lobe wedge resection. Moderate centrilobular emphysema. There are reticular, groundglass and nodular opacities in the left upper and left lower lobe with a few tiny groundglass and nodular opacities in the dependent right upper lobe and right lower lobes. Small right pleural effusion. No left pleural effusion or pneumothorax. MEDIASTINUM/AXILLAE: Heart is normal without significant pericardial effusion. Median sternotomy and aortic valvular repair. Coronary artery calcifications are noted. The thoracic aorta is normal in caliber for age with mild calcified atheromatous plaque. No axillary, mediastinal or obvious hilar lymphadenopathy, though evaluation is limited in the absence of intravenous contrast. There is a mildly patulous fluid-filled esophagus. HEPATOBILIARY: Stable subcentimeter hypodensity in the inferior right lobe of the liver since at least 2016 and of doubtful clinical significance. Gallbladder grossly unremarkable. PANCREAS: Mild fatty atrophy. SPLEEN: Normal. ADRENAL GLANDS: Normal. KIDNEYS/BLADDER: No hydronephrosis. Urinary bladder decompressed about a Suazo  catheter. BOWEL: No bowel obstruction and. LYMPH NODES: No abdominal or pelvic lymphadenopathy. VASCULATURE: Increase in left projecting saccular infrarenal aortic aneurysm measuring 3.6 x 2.5 cm. Moderate calcified atheromatous plaque of the abdominal aorta and major branch vessels. PELVIC ORGANS: Grossly unremarkable. OTHER: No free fluid or pneumoperitoneum. MUSCULOSKELETAL: Osteopenia. Mild L5 compression deformity without significant bony retropulsion. Anterior flowing thoracic osteophytes of the thoracic spine consistent with DISH.     IMPRESSION: 1.  Bilateral reticular, groundglass and nodular opacities consistent with multifocal infectious inflammatory pneumonitis. The distribution is commonly seen with aspiration. Findings would be considered uncommon for a novel Coronavirus (COVID-19). 2.  Patulous esophagus. 3.  Trace bilateral pleural effusions 4.  No acute abdominal or pelvic process. 5.  Slight increase in saccular infrarenal abdominal aortic aneurysm measuring up to 3.6 cm. KULWINDER SALVADOR MD

## 2020-03-26 NOTE — PROVIDER NOTIFICATION
Paged Dr Malagon regarding BP 82/47, , increased oxygen to 8 L Oxymask, sepsis protocal was alerted. Waiting for return call and lactic acid results.

## 2020-03-26 NOTE — PROVIDER NOTIFICATION
MD Notification    Notified Person: MD    Notified Person Name: Dr. Malagon    Notification Date/Time: 3/26/2020 1218    Notification Interaction: In person notification. MD at station 66 nursing station.     Purpose of Notification: Critical lab result, procalcitonin level 11.41    Orders Received: NA    Comments:

## 2020-03-26 NOTE — PLAN OF CARE
Discharge Planner PT   Patient plan for discharge: Home  Current status:     Pt on 5L supp O2 via NC at rest, sats stable. STS with FWW and CGA x 2. Braces BLE against chair for improved stability. Pt Ambulated 80' in hallway with FWW on 6L supp O2, sats dropped to low 80's. Upon seated rest sats increased > 90% in < 30 seconds. HR increased 115-130 bpm during ambulation. Pt denied fatigue or SOB during session. Reviewed seated exercises     Barriers to return to prior living situation: Level of assist, activity tolerance, fall risk   Recommendations for discharge: tcu  Rationale for recommendations: Pt will benefit from continued skilled PT at TCU to improve strength, balance, gait, endurance to improve functional mobility prior to return home as pt currently below baseline for functional mobility    If pt declines TCU and DCs to home recommend A x 1 with FWW transfers, gait. HHPT          Entered by: Valrey Corona 03/26/2020 4:26 PM

## 2020-03-27 ENCOUNTER — APPOINTMENT (OUTPATIENT)
Dept: OCCUPATIONAL THERAPY | Facility: CLINIC | Age: 85
DRG: 871 | End: 2020-03-27
Payer: COMMERCIAL

## 2020-03-27 ENCOUNTER — APPOINTMENT (OUTPATIENT)
Dept: PHYSICAL THERAPY | Facility: CLINIC | Age: 85
DRG: 871 | End: 2020-03-27
Payer: COMMERCIAL

## 2020-03-27 LAB
ANION GAP SERPL CALCULATED.3IONS-SCNC: 2 MMOL/L (ref 3–14)
BUN SERPL-MCNC: 20 MG/DL (ref 7–30)
CALCIUM SERPL-MCNC: 8 MG/DL (ref 8.5–10.1)
CHLORIDE SERPL-SCNC: 109 MMOL/L (ref 94–109)
CO2 SERPL-SCNC: 29 MMOL/L (ref 20–32)
CREAT SERPL-MCNC: 0.86 MG/DL (ref 0.66–1.25)
ERYTHROCYTE [DISTWIDTH] IN BLOOD BY AUTOMATED COUNT: 14 % (ref 10–15)
GFR SERPL CREATININE-BSD FRML MDRD: 78 ML/MIN/{1.73_M2}
GLUCOSE SERPL-MCNC: 106 MG/DL (ref 70–99)
HCT VFR BLD AUTO: 34.4 % (ref 40–53)
HGB BLD-MCNC: 10.5 G/DL (ref 13.3–17.7)
MCH RBC QN AUTO: 26.7 PG (ref 26.5–33)
MCHC RBC AUTO-ENTMCNC: 30.5 G/DL (ref 31.5–36.5)
MCV RBC AUTO: 88 FL (ref 78–100)
PLATELET # BLD AUTO: 139 10E9/L (ref 150–450)
POTASSIUM SERPL-SCNC: 4.6 MMOL/L (ref 3.4–5.3)
RBC # BLD AUTO: 3.93 10E12/L (ref 4.4–5.9)
SODIUM SERPL-SCNC: 140 MMOL/L (ref 133–144)
WBC # BLD AUTO: 10.7 10E9/L (ref 4–11)

## 2020-03-27 PROCEDURE — 94640 AIRWAY INHALATION TREATMENT: CPT

## 2020-03-27 PROCEDURE — 25000128 H RX IP 250 OP 636: Performed by: INTERNAL MEDICINE

## 2020-03-27 PROCEDURE — 99232 SBSQ HOSP IP/OBS MODERATE 35: CPT | Performed by: INTERNAL MEDICINE

## 2020-03-27 PROCEDURE — 25000132 ZZH RX MED GY IP 250 OP 250 PS 637: Performed by: INTERNAL MEDICINE

## 2020-03-27 PROCEDURE — 25000125 ZZHC RX 250: Performed by: INTERNAL MEDICINE

## 2020-03-27 PROCEDURE — 12000000 ZZH R&B MED SURG/OB

## 2020-03-27 PROCEDURE — 97116 GAIT TRAINING THERAPY: CPT | Mod: GP

## 2020-03-27 PROCEDURE — 97110 THERAPEUTIC EXERCISES: CPT | Mod: GO

## 2020-03-27 PROCEDURE — 97530 THERAPEUTIC ACTIVITIES: CPT | Mod: GP

## 2020-03-27 PROCEDURE — 97535 SELF CARE MNGMENT TRAINING: CPT | Mod: GO

## 2020-03-27 PROCEDURE — 80048 BASIC METABOLIC PNL TOTAL CA: CPT | Performed by: INTERNAL MEDICINE

## 2020-03-27 PROCEDURE — 94640 AIRWAY INHALATION TREATMENT: CPT | Mod: 76

## 2020-03-27 PROCEDURE — 25000132 ZZH RX MED GY IP 250 OP 250 PS 637: Performed by: HOSPITALIST

## 2020-03-27 PROCEDURE — 85027 COMPLETE CBC AUTOMATED: CPT | Performed by: INTERNAL MEDICINE

## 2020-03-27 PROCEDURE — 40000275 ZZH STATISTIC RCP TIME EA 10 MIN

## 2020-03-27 PROCEDURE — 25000131 ZZH RX MED GY IP 250 OP 636 PS 637: Performed by: INTERNAL MEDICINE

## 2020-03-27 PROCEDURE — 36415 COLL VENOUS BLD VENIPUNCTURE: CPT | Performed by: INTERNAL MEDICINE

## 2020-03-27 RX ADMIN — ATORVASTATIN CALCIUM 10 MG: 10 TABLET, FILM COATED ORAL at 08:36

## 2020-03-27 RX ADMIN — PANTOPRAZOLE SODIUM 40 MG: 40 TABLET, DELAYED RELEASE ORAL at 16:22

## 2020-03-27 RX ADMIN — IPRATROPIUM BROMIDE AND ALBUTEROL SULFATE 3 ML: .5; 3 SOLUTION RESPIRATORY (INHALATION) at 19:27

## 2020-03-27 RX ADMIN — IPRATROPIUM BROMIDE AND ALBUTEROL SULFATE 3 ML: .5; 3 SOLUTION RESPIRATORY (INHALATION) at 11:21

## 2020-03-27 RX ADMIN — METOPROLOL TARTRATE 12.5 MG: 25 TABLET, FILM COATED ORAL at 08:36

## 2020-03-27 RX ADMIN — UMECLIDINIUM 1 PUFF: 62.5 AEROSOL, POWDER ORAL at 12:29

## 2020-03-27 RX ADMIN — CLINDAMYCIN PHOSPHATE 900 MG: 900 INJECTION, SOLUTION INTRAVENOUS at 04:00

## 2020-03-27 RX ADMIN — METOPROLOL TARTRATE 12.5 MG: 25 TABLET, FILM COATED ORAL at 20:09

## 2020-03-27 RX ADMIN — GABAPENTIN 300 MG: 300 CAPSULE ORAL at 08:36

## 2020-03-27 RX ADMIN — PANTOPRAZOLE SODIUM 40 MG: 40 TABLET, DELAYED RELEASE ORAL at 06:37

## 2020-03-27 RX ADMIN — PREDNISONE 20 MG: 20 TABLET ORAL at 08:36

## 2020-03-27 RX ADMIN — IPRATROPIUM BROMIDE AND ALBUTEROL SULFATE 3 ML: .5; 3 SOLUTION RESPIRATORY (INHALATION) at 16:22

## 2020-03-27 RX ADMIN — GABAPENTIN 900 MG: 300 CAPSULE ORAL at 20:09

## 2020-03-27 RX ADMIN — CLINDAMYCIN PHOSPHATE 900 MG: 900 INJECTION, SOLUTION INTRAVENOUS at 11:43

## 2020-03-27 RX ADMIN — CEFEPIME HYDROCHLORIDE 1 G: 1 INJECTION, POWDER, FOR SOLUTION INTRAMUSCULAR; INTRAVENOUS at 12:29

## 2020-03-27 RX ADMIN — CLINDAMYCIN PHOSPHATE 900 MG: 900 INJECTION, SOLUTION INTRAVENOUS at 18:56

## 2020-03-27 ASSESSMENT — ACTIVITIES OF DAILY LIVING (ADL)
ADLS_ACUITY_SCORE: 17

## 2020-03-27 ASSESSMENT — MIFFLIN-ST. JEOR: SCORE: 1514.25

## 2020-03-27 NOTE — PLAN OF CARE
DATE & TIME: 3/26 marlin    Cognitive Concerns/ Orientation : A&Ox4; forgetful   BEHAVIOR & AGGRESSION TOOL COLOR: green, calm/coop  ABNL VS/O2: 5L  per Oxymask  MOBILITY: Ax1 with GB/walker up in the chair  PAIN MANAGMENT: denies  DIET: regular-good appetite  BOWEL/BLADDER: continent of B/B, no BM tonight, using urinal at bedside, good output  ABNL LAB/BG: WBC 9  Hgb 11, Procal 11/trending down, lactic acid 0.9  DRAIN/DEVICES: PIV SL  SKIN: Skin tear on L arm, mepilex CDI -WOC consulted, scattered bruising.   TESTS/PROCEDURES: none  D/C DAY/GOALS/PLACE: to TCU in 1-2 days pending progress/placement  OTHER IMPORTANT INFO: BLE trace edema, LS-diminished, occasional productive cough, LAROSE, continues on IV Abxs/Nebs.

## 2020-03-27 NOTE — PLAN OF CARE
Discharge Planner PT   Patient plan for discharge: Home   Current status: Patient on 4L O2 this date, completing transfers with FWW and CGA, bracing legs on chair for support. Patient ambulated 150 feet with FWW and CGA to SBA, some path deviations present but no overt LOB. Patient requiring 2 standing rest breaks due to SOB. SpO2 after gait noted as 83%, recovering to 91% within 1 minute with cues for PLB. Patient in chair at end of session, all needs in reach. Recommended continued walking with nursing staff throughout the day, patient in agreement.   Barriers to return to prior living situation: Current level of A, decreased activity tolerance, stairs, falls risk   Recommendations for discharge: TCU  Rationale for recommendations: Patient requiring increased level of A for all mobility/ambulation, continues to be limited by decreased tolerance to activity, increased O2 needs. Patient would benefit from continued skilled therapy to further improve strength, balance, and independence with mobility and ambulation to address functional limitations and decrease falls risk.      If patient declines TCU, would require A x 1 with FWW for all mobility/gait with HH PT to further improve above functional limitations. Patient qualifies for HH PT as he requires use of AD to leave the home.        Entered by: Lexi Navarro 03/27/2020 10:07 AM

## 2020-03-27 NOTE — PLAN OF CARE
Discharge Planner OT   Patient plan for discharge: home. reports being open to home therapy  Current status: Pt educated re: adaptive equipment recommendations, fall prevention strategies, energy conservation strategies, pursed lip breathing technique and was given educational handouts. Donned depends and donned/doffed socks with SBA. Bluish feet, pt reports this is baseline. Brushed teeth standing at sink, dropped toothbrush. Ambulated 100 ft in [place] with FWW and cues for posture using 4L supplemental O2. O2 sat at 85% despite cues for PLB.  Barriers to return to prior living situation: Current level of A for I/ADLs and functional mobility, fall risk, decreased activity tolerance, impaired insight, decreased cognition (11/30 on SLUMS)  Recommendations for discharge: TCU   Rationale for recommendations: Pt is below baseline in I/ADLs and functional mobility. Pt will benefit from skilled therapy to address safety and independence in I/ADLs. If pt returns home, pt will need increased assist in all I/ADLs and home OT.        Entered by: Lin Aragon 03/27/2020 3:49 PM

## 2020-03-27 NOTE — PROGRESS NOTES
DATE & TIME: 3/26 7494-8424    Cognitive Concerns/ Orientation : A&Ox4; forgetful   BEHAVIOR & AGGRESSION TOOL COLOR: green  CIWA SCORE: na   ABNL VS/O2: 4 L nasal cannula   MOBILITY: Ax1 with gb and walker  PAIN MANAGMENT: denied   DIET: regular, tolerating   BOWEL/BLADDER: continent of B/B - no BM on shift. Using urinal at bedside   ABNL LAB/BG: Hgb 11.6, Procal 11.41 - trending down.   DRAIN/DEVICES: PIV SL  TELEMETRY RHYTHM: na  SKIN: Skin tear on L arm, mepilex CDI. Scattered bruising.   TESTS/PROCEDURES: na  D/C DAY/GOALS/PLACE: TCU 1-2 days pending progress and treatment   OTHER IMPORTANT INFO: LS diminished. IV antibiotics. LAROSE.

## 2020-03-27 NOTE — PROGRESS NOTES
[facility]  Hospitalist Progress Note  En Malagon MD  03/27/2020    Assessment & Plan   Hermilo Velasquez is a 87 year old male admitted to the intensive care unit on 3/22/2020 with septic vs hypovolemic shock likely secondary to hematemesis and aspiration pneumonia.     Hypovolemic shock   Soft blood pressures   Lactic acidosis   -- Resolved with intravenous fluid and packed red blood cells  -- stable bp and not tachycardic  -- will give NS bolus 500 cc     Hematemesis due to acute upper gastrointestinal hemorrhage in the setting of anticoagulation with warfarin   Acute blood loss anemia   History of gastroesophageal reflux disease, gastric and duodenal angiectasias   Patient's wife found him in bed covered in dark emesis with coffee-ground appearance and also had diarrhea.  Hemoglobin 12.8 in ED.  Patient was given 1 unit PRBC in ED.  He also received vitamin K and intravenous proton pump inhibitor.  Of note he had a upper endoscopy July 2018 with normal esophagus, a few 1 to 2 mm nonbleeding angiectasias in the stomach as well as two 2 to 3 mm angiectasias in the second portion of the duodenum.  Colonoscopy at that time also showed non-bleeding angioid ectasias and several polyps.  Hemoglobin down 1 gram overnight but no overt bleeding.    Advance to regular diet    Change to po proton pump inhibitor bid for 4 weeks than daily, prn H2 blocker    Monitor hemoglobin, stabilized and improving 10.6 >> 11.6    INR 1.28, will repeat    GI Deferring esophagogastroduodenoscopy if patient's GI bleed improving.     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  Respiratory status is stable. Pro-calcitonin was 85 and is down to 50's today.  He has no fever but remains on supplemental oxygen.    Was treated with intravenous cefepime and clindamycin (PCN  allergy) for past 4 days, will consider discontinuing tomorrow    Blood cultures 1/2 CNS    Inhaled bronchodilators as needed; continue umeclidinium    continue prednisone, scheduled nebs, oxygen requirements down to 4L from 8L      Repeat cxr 3/26 showed RLL atelectasis vs infiltrate, continue spirometry     Acute kidney injury     Remove Suazo    Creatinine improved 2 > 1.1.6 > 0.94     NSTEMI, likely type 2 - demand ischemia  Hypertension on metoprolol succinate prior to admission   Patient denies any recent chest pain, palpitations, or shortness of breath however he is a poor historian.  Per his wife he had been feeling at baseline up until this morning.  He reports compliance with his home medications.  Initial troponin 0.58, EKG without signs of overt ischemia.    Transthoracic echocardiogram shows normal LVEF 55-60%, milld LVH, mild LAE    Continue bid metoprolol, will decrease dose with soft bp    No aspirin due to upper gastrointestinal hemorrhage      Paroxysmal atrial fibrillation on warfarin    Continue to hold warfarin temporarily    would not resume unless we have an endoscopic diagnosis, he has hx of AVM    INR 1.29     Hyperlipidemia    Continue PTA statin     Chronic pruritus    PTA Atarax       History of lung cancer and NHL in remission      Diet: advance to regular  DVT Prophylaxis: Pneumatic Compression Devices  Suazo Catheter: in place, indication: Retention  Code Status: Full Code        Disposition Plan  -- therapies working, recommending TCU, but patient wants to go home with wife and refusing home care.  -- anticipate on 3/28 vs 3/29      Interval History   -- improved blood pressures  -- now decreasing oxygen requirements  -- eating better    -Data reviewed today: I reviewed all new labs and imaging over the last 24 hours. I personally reviewed no images or EKG's today.    Physical Exam   Heart Rate: 84, Blood pressure 122/58, pulse 93, temperature 97.8  F (36.6  C), temperature source  "Oral, resp. rate 16, height 1.778 m (5' 10\"), weight 83.3 kg (183 lb 10.3 oz), SpO2 92 %.  Vitals:    03/25/20 0615 03/26/20 0600 03/27/20 0500   Weight: 83.5 kg (184 lb 1.4 oz) 83.2 kg (183 lb 8 oz) 83.3 kg (183 lb 10.3 oz)     Vital Signs with Ranges  Temp:  [97.8  F (36.6  C)-98.2  F (36.8  C)] 97.8  F (36.6  C)  Pulse:  [93-96] 93  Heart Rate:  [84-96] 84  Resp:  [16-18] 16  BP: ()/(51-60) 122/58  SpO2:  [92 %-97 %] 92 %  I/O's Last 24 hours  I/O last 3 completed shifts:  In: 1340 [P.O.:1340]  Out: 1445 [Urine:1445]    Constitutional: Awake, alert, cooperative, no apparent distress  Respiratory: Right lung base diminished, improved wheezing  Cardiovascular: Regular rate and rhythm, normal S1 and S2, and no murmur noted  GI: Normal bowel sounds, soft, non-distended, non-tender  Skin/Integumen: No rashes, no cyanosis, no edema  Other:      Medications   All medications were reviewed.      atorvastatin  10 mg Oral Daily     ceFEPIme (MAXIPIME) IV  1 g Intravenous Q24H     clindamycin  900 mg Intravenous Q8H     gabapentin  300 mg Oral QAM     gabapentin  900 mg Oral At Bedtime     ipratropium - albuterol 0.5 mg/2.5 mg/3 mL  3 mL Nebulization 4x daily     metoprolol tartrate  12.5 mg Oral BID     pantoprazole  40 mg Oral BID AC     predniSONE  20 mg Oral Daily     umeclidinium  1 puff Inhalation Daily        Data   Recent Labs   Lab 03/27/20  0804 03/26/20  0812 03/25/20  0740 03/24/20  1201  03/23/20  0459  03/22/20  1914   WBC 10.7 9.7 13.1*  --    < > 28.1*  --  32.1*   HGB 10.5* 11.6* 10.6*  --    < > 11.6*   < > 12.8*   MCV 88 87 88  --    < > 87  --  89   * 142* 134*  --    < > 165  --  214   INR  --   --   --  1.28*  --  1.68*  --  1.93*    137 138  --    < > 139  --  138   POTASSIUM 4.6 4.3 4.3  --    < > 5.0  --  4.7   CHLORIDE 109 106 109  --    < > 111*  --  103   CO2 29 27 27  --    < > 23  --  26   BUN 20 20 27  --    < > 42*  --  33*   CR 0.86 0.93 0.94  --    < > 2.03*  --  2.57* "   ANIONGAP 2* 4 2*  --    < > 5  --  9   AMRITA 8.0* 8.5 8.2*  --    < > 7.5*  --  8.4*   * 106* 114*  --    < > 148*  --  147*   ALBUMIN  --   --   --   --   --  2.2*  --  2.6*   PROTTOTAL  --   --   --   --   --  5.4*  --  6.2*   BILITOTAL  --   --   --   --   --  0.6  --  0.6   ALKPHOS  --   --   --   --   --  55  --  80   ALT  --   --   --   --   --  30  --  27   AST  --   --   --   --   --  126*  --  101*   LIPASE  --   --   --   --   --   --   --  54*   TROPI  --   --   --   --   --  0.738*  --  0.580*    < > = values in this interval not displayed.       Recent Results (from the past 24 hour(s))   XR Chest Port 1 View    Narrative    CHEST ONE VIEW  3/26/2020 12:20 PM     HISTORY: Increase oxygen requirement.    COMPARISON: December 22, 2019      Impression    IMPRESSION: Mild left lower lobe atelectasis and/or infiltrate. Right  lung clear.    MD En MENEZES MD  Text Page  (7am to 6pm)

## 2020-03-27 NOTE — PLAN OF CARE
Cognitive Concerns/ Orientation : A&Ox4; forgetful   BEHAVIOR & AGGRESSION TOOL COLOR: green  CIWA SCORE: na   ABNL VS/O2: 4 L nasal cannula attempting to wean  MOBILITY: Ax1 with gb and walker  PAIN MANAGMENT: denied   DIET: regular, tolerating   BOWEL/BLADDER: continent of B/B   ABNL LAB/BG:   DRAIN/DEVICES: PIV SL  TELEMETRY RHYTHM: na  SKIN: Skin tear on L arm, mepilex CDI. Scattered bruising.   TESTS/PROCEDURES: na  D/C DAY/GOALS/PLACE: discharge possible tomorrow  OTHER IMPORTANT INFO: LS diminished. Minimal nonproductive cough. Using IS.IV antibiotics. LAROSE. desats on room air to 76%, recovered in 2 minutes to 91% on 3 L NC.

## 2020-03-28 ENCOUNTER — APPOINTMENT (OUTPATIENT)
Dept: ULTRASOUND IMAGING | Facility: CLINIC | Age: 85
DRG: 871 | End: 2020-03-28
Attending: INTERNAL MEDICINE
Payer: COMMERCIAL

## 2020-03-28 ENCOUNTER — APPOINTMENT (OUTPATIENT)
Dept: CT IMAGING | Facility: CLINIC | Age: 85
DRG: 871 | End: 2020-03-28
Attending: INTERNAL MEDICINE
Payer: COMMERCIAL

## 2020-03-28 ENCOUNTER — APPOINTMENT (OUTPATIENT)
Dept: PHYSICAL THERAPY | Facility: CLINIC | Age: 85
DRG: 871 | End: 2020-03-28
Payer: COMMERCIAL

## 2020-03-28 LAB
ANION GAP SERPL CALCULATED.3IONS-SCNC: 1 MMOL/L (ref 3–14)
BACTERIA SPEC CULT: ABNORMAL
BACTERIA SPEC CULT: NO GROWTH
BUN SERPL-MCNC: 17 MG/DL (ref 7–30)
CALCIUM SERPL-MCNC: 8.2 MG/DL (ref 8.5–10.1)
CHLORIDE SERPL-SCNC: 105 MMOL/L (ref 94–109)
CO2 SERPL-SCNC: 30 MMOL/L (ref 20–32)
CREAT SERPL-MCNC: 0.87 MG/DL (ref 0.66–1.25)
ERYTHROCYTE [DISTWIDTH] IN BLOOD BY AUTOMATED COUNT: 14.1 % (ref 10–15)
GFR SERPL CREATININE-BSD FRML MDRD: 77 ML/MIN/{1.73_M2}
GLUCOSE SERPL-MCNC: 95 MG/DL (ref 70–99)
HCT VFR BLD AUTO: 32.4 % (ref 40–53)
HGB BLD-MCNC: 10.2 G/DL (ref 13.3–17.7)
INR PPP: 1.03 (ref 0.86–1.14)
MCH RBC QN AUTO: 27.3 PG (ref 26.5–33)
MCHC RBC AUTO-ENTMCNC: 31.5 G/DL (ref 31.5–36.5)
MCV RBC AUTO: 87 FL (ref 78–100)
PLATELET # BLD AUTO: 192 10E9/L (ref 150–450)
POTASSIUM SERPL-SCNC: 4.4 MMOL/L (ref 3.4–5.3)
RADIOLOGIST FLAGS: ABNORMAL
RBC # BLD AUTO: 3.73 10E12/L (ref 4.4–5.9)
SODIUM SERPL-SCNC: 136 MMOL/L (ref 133–144)
SPECIMEN SOURCE: ABNORMAL
SPECIMEN SOURCE: NORMAL
WBC # BLD AUTO: 11.3 10E9/L (ref 4–11)

## 2020-03-28 PROCEDURE — 25000132 ZZH RX MED GY IP 250 OP 250 PS 637: Performed by: INTERNAL MEDICINE

## 2020-03-28 PROCEDURE — 12000000 ZZH R&B MED SURG/OB

## 2020-03-28 PROCEDURE — 99233 SBSQ HOSP IP/OBS HIGH 50: CPT | Performed by: INTERNAL MEDICINE

## 2020-03-28 PROCEDURE — 85027 COMPLETE CBC AUTOMATED: CPT | Performed by: INTERNAL MEDICINE

## 2020-03-28 PROCEDURE — 36415 COLL VENOUS BLD VENIPUNCTURE: CPT | Performed by: INTERNAL MEDICINE

## 2020-03-28 PROCEDURE — 97530 THERAPEUTIC ACTIVITIES: CPT | Mod: GP

## 2020-03-28 PROCEDURE — 25000125 ZZHC RX 250: Performed by: INTERNAL MEDICINE

## 2020-03-28 PROCEDURE — 94640 AIRWAY INHALATION TREATMENT: CPT | Mod: 76

## 2020-03-28 PROCEDURE — 25000132 ZZH RX MED GY IP 250 OP 250 PS 637: Performed by: HOSPITALIST

## 2020-03-28 PROCEDURE — 93970 EXTREMITY STUDY: CPT

## 2020-03-28 PROCEDURE — 80048 BASIC METABOLIC PNL TOTAL CA: CPT | Performed by: INTERNAL MEDICINE

## 2020-03-28 PROCEDURE — 25000125 ZZHC RX 250: Performed by: HOSPITALIST

## 2020-03-28 PROCEDURE — 94640 AIRWAY INHALATION TREATMENT: CPT

## 2020-03-28 PROCEDURE — 25000131 ZZH RX MED GY IP 250 OP 636 PS 637: Performed by: INTERNAL MEDICINE

## 2020-03-28 PROCEDURE — 71260 CT THORAX DX C+: CPT

## 2020-03-28 PROCEDURE — 40000275 ZZH STATISTIC RCP TIME EA 10 MIN

## 2020-03-28 PROCEDURE — 25000128 H RX IP 250 OP 636: Performed by: HOSPITALIST

## 2020-03-28 PROCEDURE — 85610 PROTHROMBIN TIME: CPT | Performed by: INTERNAL MEDICINE

## 2020-03-28 PROCEDURE — 97116 GAIT TRAINING THERAPY: CPT | Mod: GP

## 2020-03-28 RX ORDER — CEFEPIME HYDROCHLORIDE 1 G/1
1 INJECTION, POWDER, FOR SOLUTION INTRAMUSCULAR; INTRAVENOUS EVERY 12 HOURS
Status: DISCONTINUED | OUTPATIENT
Start: 2020-03-28 | End: 2020-03-28

## 2020-03-28 RX ORDER — IOPAMIDOL 755 MG/ML
80 INJECTION, SOLUTION INTRAVASCULAR ONCE
Status: COMPLETED | OUTPATIENT
Start: 2020-03-28 | End: 2020-03-28

## 2020-03-28 RX ADMIN — PANTOPRAZOLE SODIUM 40 MG: 40 TABLET, DELAYED RELEASE ORAL at 18:26

## 2020-03-28 RX ADMIN — IOPAMIDOL 80 ML: 755 INJECTION, SOLUTION INTRAVENOUS at 12:06

## 2020-03-28 RX ADMIN — IPRATROPIUM BROMIDE AND ALBUTEROL SULFATE 3 ML: .5; 3 SOLUTION RESPIRATORY (INHALATION) at 18:58

## 2020-03-28 RX ADMIN — GABAPENTIN 900 MG: 300 CAPSULE ORAL at 21:18

## 2020-03-28 RX ADMIN — IPRATROPIUM BROMIDE AND ALBUTEROL SULFATE 3 ML: .5; 3 SOLUTION RESPIRATORY (INHALATION) at 15:41

## 2020-03-28 RX ADMIN — SODIUM CHLORIDE 80 ML: 9 INJECTION, SOLUTION INTRAVENOUS at 12:06

## 2020-03-28 RX ADMIN — PANTOPRAZOLE SODIUM 40 MG: 40 TABLET, DELAYED RELEASE ORAL at 06:38

## 2020-03-28 RX ADMIN — Medication 1 SPRAY: at 17:55

## 2020-03-28 RX ADMIN — GABAPENTIN 300 MG: 300 CAPSULE ORAL at 08:24

## 2020-03-28 RX ADMIN — UMECLIDINIUM 1 PUFF: 62.5 AEROSOL, POWDER ORAL at 08:26

## 2020-03-28 RX ADMIN — IPRATROPIUM BROMIDE AND ALBUTEROL SULFATE 3 ML: .5; 3 SOLUTION RESPIRATORY (INHALATION) at 11:21

## 2020-03-28 RX ADMIN — IPRATROPIUM BROMIDE AND ALBUTEROL SULFATE 3 ML: .5; 3 SOLUTION RESPIRATORY (INHALATION) at 07:24

## 2020-03-28 RX ADMIN — PREDNISONE 20 MG: 20 TABLET ORAL at 08:24

## 2020-03-28 RX ADMIN — ATORVASTATIN CALCIUM 10 MG: 10 TABLET, FILM COATED ORAL at 08:24

## 2020-03-28 RX ADMIN — METOPROLOL TARTRATE 12.5 MG: 25 TABLET, FILM COATED ORAL at 21:18

## 2020-03-28 RX ADMIN — CLINDAMYCIN PHOSPHATE 900 MG: 900 INJECTION, SOLUTION INTRAVENOUS at 03:27

## 2020-03-28 ASSESSMENT — ACTIVITIES OF DAILY LIVING (ADL)
ADLS_ACUITY_SCORE: 17
ADLS_ACUITY_SCORE: 18
ADLS_ACUITY_SCORE: 18
ADLS_ACUITY_SCORE: 17

## 2020-03-28 NOTE — PROGRESS NOTES
DATE & TIME: 3/27 5063-3227   Cognitive Concerns/ Orientation : A&Ox4; forgetful   BEHAVIOR & AGGRESSION TOOL COLOR: green  ABNL VS/O2: VSS on 4L, bumped to 4L during night shift due to desatting while sleeping   MOBILITY: Ax1 gb and walker  PAIN MANAGMENT: denied   DIET: regular, tolerating well  BOWEL/BLADDER: continent of B/B using urinal at bedside   ABNL LAB/BG: Platelet 139 Hgb 10.5   DRAIN/DEVICES: PIV SL - L arm; intermittent IV abx   TELEMETRY RHYTHM: na  SKIN: Skin tear L arm, CDI. Scattered bruising   TESTS/PROCEDURES: na  D/C DAY/GOALS/PLACE: possibly today or tomorrow, wanting to return home with wife and refusing home care per MD note   OTHER IMPORTANT INFO: RAMIREZ barba. LAROSE.

## 2020-03-28 NOTE — PROGRESS NOTES
MD Notification    Notified Person: MD    Notified Person Name: Dr. Malagon     Notification Date/Time: 03/28/20 1230    Notification Interaction: Web page     Purpose of Notification: Call from Radiologist PE RLL    Orders Received: Spoke verbally w/ MD, plan to US legs.     Comments:

## 2020-03-28 NOTE — PROGRESS NOTES
[facility]  Hospitalist Progress Note  En Malagon MD  03/28/2020    Assessment & Plan   Hermilo Velasquez is a 87 year old male admitted to the intensive care unit on 3/22/2020 with septic vs hypovolemic shock likely secondary to hematemesis and aspiration pneumonia.     Persistent hypoxemia with hx of COPD, NHL and lung cancer  RLE PE  -- has hx of COPD  -- oxygen requirements remain around 5 liters  -- cxr shows mild basilar atelcectasis  -- CT of chest + RLL PE  -- will check bilateral LE US and if + may need temporary IVC filter in setting of severe GI bleed and hx of AVM and angioectasia  -- set up for home oxygen     Hypovolemic shock   Soft blood pressures   Lactic acidosis   -- Resolved with intravenous fluid and packed red blood cells  -- stable bp and not tachycardic  -- will give NS bolus 500 ml  -- s/p treatment with cefepime and clindamycin for poss aspiration pneumonia     Hematemesis due to acute upper gastrointestinal hemorrhage in the setting of anticoagulation with warfarin   Acute blood loss anemia   History of gastroesophageal reflux disease, gastric and duodenal angiectasias   Patient's wife found him in bed covered in dark emesis with coffee-ground appearance and also had diarrhea.  Hemoglobin 12.8 in ED.  Patient was given 1 unit PRBC in ED.  He also received vitamin K and intravenous proton pump inhibitor.  Of note he had a upper endoscopy July 2018 with normal esophagus, a few 1 to 2 mm nonbleeding angiectasias in the stomach as well as two 2 to 3 mm angiectasias in the second portion of the duodenum.  Colonoscopy at that time also showed non-bleeding angioid ectasias and several polyps.  Hemoglobin down 1 gram overnight but no overt bleeding.    Advance to regular diet    Change to po proton pump inhibitor bid for 4 weeks than daily, prn H2 blocker    Monitor hemoglobin, stabilized and improving 10.6 >> 11.6 > 10.2    INR 1.28,  Repeat INR 1.03    GI Deferring  esophagogastroduodenoscopy if patient's GI bleed improving.     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  Respiratory status is stable. Pro-calcitonin was 85 and is down to 50's today.  He has no fever but remains on supplemental oxygen.    Was treated with intravenous cefepime and clindamycin (PCN allergy) for past 5 days, will discontinue    Blood cultures 1/2 CNS    Inhaled bronchodilators as needed; continue umeclidinium    continue prednisone, scheduled nebs, oxygen requirements down to 4L from 8L      Repeat cxr 3/26 showed RLL atelectasis vs infiltrate, continue spirometry     Acute kidney injury     Remove Suazo    Creatinine improved 2 > 1.1.6 > 0.94     NSTEMI, likely type 2 - demand ischemia  Hypertension on metoprolol succinate prior to admission   Patient denies any recent chest pain, palpitations, or shortness of breath however he is a poor historian.  Per his wife he had been feeling at baseline up until this morning.  He reports compliance with his home medications.  Initial troponin 0.58, EKG without signs of overt ischemia.    Transthoracic echocardiogram shows normal LVEF 55-60%, milld LVH, mild LAE    Continue bid metoprolol, will decrease dose with soft bp    No aspirin due to upper gastrointestinal hemorrhage      Paroxysmal atrial fibrillation on warfarin    Continue to hold warfarin temporarily    would not resume unless we have an endoscopic diagnosis, he has hx of AVM    INR 1.06     Hyperlipidemia    Continue PTA statin     Chronic pruritus    PTA Atarax       History of lung cancer and NHL in remission      Diet: advance to regular  DVT Prophylaxis: Pneumatic Compression Devices  Suazo Catheter: in place, indication: Retention  Code Status: Full Code        Disposition Plan  -- therapies working, recommending TCU, but patient wants to go home  "with wife and refusing home care.  -- anticipate on  3/29 with home oxygen      Interval History   -- now decreasing oxygen requirements  -- eating better    -Data reviewed today: I reviewed all new labs and imaging over the last 24 hours. I personally reviewed no images or EKG's today.    Physical Exam   Heart Rate: 91, Blood pressure 97/56, pulse 100, temperature 98.1  F (36.7  C), temperature source Oral, resp. rate 16, height 1.778 m (5' 10\"), weight 83.3 kg (183 lb 10.3 oz), SpO2 90 %.  Vitals:    03/25/20 0615 03/26/20 0600 03/27/20 0500   Weight: 83.5 kg (184 lb 1.4 oz) 83.2 kg (183 lb 8 oz) 83.3 kg (183 lb 10.3 oz)     Vital Signs with Ranges  Temp:  [98.1  F (36.7  C)-98.2  F (36.8  C)] 98.1  F (36.7  C)  Pulse:  [100] 100  Heart Rate:  [] 91  Resp:  [16] 16  BP: ()/(52-60) 97/56  SpO2:  [76 %-95 %] 90 %  I/O's Last 24 hours  I/O last 3 completed shifts:  In: 2320 [P.O.:2320]  Out: 2250 [Urine:2250]    Constitutional: Awake, alert, cooperative, no apparent distress  Respiratory: Right lung base diminished, improved wheezing  Cardiovascular: Regular rate and rhythm, normal S1 and S2, and no murmur noted  GI: Normal bowel sounds, soft, non-distended, non-tender  Skin/Integumen: No rashes, no cyanosis, no edema  Other:      Medications   All medications were reviewed.      atorvastatin  10 mg Oral Daily     gabapentin  300 mg Oral QAM     gabapentin  900 mg Oral At Bedtime     ipratropium - albuterol 0.5 mg/2.5 mg/3 mL  3 mL Nebulization 4x daily     metoprolol tartrate  12.5 mg Oral BID     pantoprazole  40 mg Oral BID AC     predniSONE  20 mg Oral Daily     umeclidinium  1 puff Inhalation Daily        Data   Recent Labs   Lab 03/28/20  0727 03/27/20  0804 03/26/20  0812  03/24/20  1201  03/23/20  0459  03/22/20  1914   WBC 11.3* 10.7 9.7   < >  --    < > 28.1*  --  32.1*   HGB 10.2* 10.5* 11.6*   < >  --    < > 11.6*   < > 12.8*   MCV 87 88 87   < >  --    < > 87  --  89    139* 142*   < > "  --    < > 165  --  214   INR 1.03  --   --   --  1.28*  --  1.68*  --  1.93*    140 137   < >  --    < > 139  --  138   POTASSIUM 4.4 4.6 4.3   < >  --    < > 5.0  --  4.7   CHLORIDE 105 109 106   < >  --    < > 111*  --  103   CO2 30 29 27   < >  --    < > 23  --  26   BUN 17 20 20   < >  --    < > 42*  --  33*   CR 0.87 0.86 0.93   < >  --    < > 2.03*  --  2.57*   ANIONGAP 1* 2* 4   < >  --    < > 5  --  9   AMRITA 8.2* 8.0* 8.5   < >  --    < > 7.5*  --  8.4*   GLC 95 106* 106*   < >  --    < > 148*  --  147*   ALBUMIN  --   --   --   --   --   --  2.2*  --  2.6*   PROTTOTAL  --   --   --   --   --   --  5.4*  --  6.2*   BILITOTAL  --   --   --   --   --   --  0.6  --  0.6   ALKPHOS  --   --   --   --   --   --  55  --  80   ALT  --   --   --   --   --   --  30  --  27   AST  --   --   --   --   --   --  126*  --  101*   LIPASE  --   --   --   --   --   --   --   --  54*   TROPI  --   --   --   --   --   --  0.738*  --  0.580*    < > = values in this interval not displayed.       No results found for this or any previous visit (from the past 24 hour(s)).    En Malagon MD  Text Page  (7am to 6pm)

## 2020-03-28 NOTE — PLAN OF CARE
OT: Attempted intervention. Pt initially on the phone. Pt then with nursing discussing current medical plan with new PE discovered. Pt awaiting RAMYA HUNT. RN in agreement to wait with OT intervention at this time.

## 2020-03-28 NOTE — PLAN OF CARE
Cognitive Concerns/ Orientation : A&Ox4; forgetful   BEHAVIOR & AGGRESSION TOOL COLOR: green  CIWA SCORE: na         ABNL VS/O2: 3 L nasal cannula from 4,  @ 95% O2 saturation while upright in chair  MOBILITY: Ax1 with gb and walker  PAIN MANAGMENT: denied   DIET: regular, 100% intake for dinner  BOWEL/BLADDER: continent of B/B , voiding adequately   ABNL LAB/BG: Platelet 139, Hgb 10.5, Calcium 8  DRAIN/DEVICES: PIV SL, Left arm   TELEMETRY RHYTHM: na  SKIN: Skin tear on L arm, CDI. bruised skin   TESTS/PROCEDURES: na  D/C DAY/GOALS/PLACE: For possible discharge  tomorrow with [facility]  OTHER IMPORTANT INFO: LS diminished. Minimal nonproductive cough. Using IS.IV antibiotics. LAROSE.     Ambulated patient in hallway, @ 2ltrs O2, saturation went down to 81%. Recovered and improved to 93% 2ltrs O2 @ rest, while sitting upright  in less than 1 minute.     With baseline neuropathy BLE  I and O 1 to 2 hrs  Declined PCDs  Needed to increase O2 supplement from to 2 to 3 ltrs when patient was in bed saturating at low 90's

## 2020-03-28 NOTE — PLAN OF CARE
"DATE & TIME: 03/28/20 7-3 pm  Cognitive Concerns/ Orientation : A & O x3, disorientated to situation. Forgetful. [place].    BEHAVIOR & AGGRESSION TOOL COLOR: Green   ABNL VS/O2: VSS on 5L nasal cannula, soft BP this am Metoprolol held.   MOBILITY: SBA w/ gait belt and walker.   PAIN MANAGMENT: Denies  DIET: Regular  BOWEL/BLADDER: continent of B/B using urinal at bedside. No BM \" in a couple days\" per pt report.   ABNL LAB/BG: WBC 11.2, Hgb 10.2, and INR 1.03.   DRAIN/DEVICES: PIV SL - L arm  TELEMETRY RHYTHM: N/A  SKIN: Skin tear L arm, CDI. Scattered bruising. Flushed/warm skin.   TESTS/PROCEDURES: na  D/C DAY/GOALS/PLACE: Pending   OTHER IMPORTANT INFO: LS diminished. Denies chest pain/SOB. PE discovered in RLL, denies any discomfort. US completed of BLE. Ambulated in hallway x1, oxygen saturation drops w/ activity. Pt does not appear dyspneic.   "

## 2020-03-28 NOTE — PLAN OF CARE
Discharge Planner PT   Patient plan for discharge: Home with spouse   Current status: Patient demos improved gait and tolerance to activity this date. Patient on 5L O2 at rest with SpO2 noted as 94%. Completing sit<>stand transfers with SBA and FWW, gait for 300 feet with FWW and SBA/supervision. Patient on 6L O2 for gait (no 5L setting on portable tank). SpO2 throughout gait dipping to below 90% after 100 feet, standing rest breaks with cues for PLB with patient able to recover in under 1 minute each time. Completed standing marches with FWW in room to simulate navigation of threshold step. Patient states he has 1 step to enter and then all needs met on single level of home. Has FWW for at home use. Patient in chair at end of session, all needs in reach and chair alarm on.   Barriers to return to prior living situation: Increased O2 needs, decreased tolerance to activity   Recommendations for discharge: Home with A from spouse for household tasks, use of FWW, HH PT   Rationale for recommendations: Patient mobilizing/ambulating well with FWW and SBA/supervision, limited most be decreased tolerance to activity and increased O2 needs. Patient safe to discharge home, once medically able, with A from spouse for household tasks and use of FWW at all times. Patient wound benefit from HH PT to further progress activity tolerance and improve strength. Patient qualifies for HH PT as he requires use of AD to leave the home.        Entered by: Lexi Navarro 03/28/2020 9:27 AM

## 2020-03-29 ENCOUNTER — APPOINTMENT (OUTPATIENT)
Dept: OCCUPATIONAL THERAPY | Facility: CLINIC | Age: 85
DRG: 871 | End: 2020-03-29
Payer: COMMERCIAL

## 2020-03-29 ENCOUNTER — APPOINTMENT (OUTPATIENT)
Dept: PHYSICAL THERAPY | Facility: CLINIC | Age: 85
DRG: 871 | End: 2020-03-29
Payer: COMMERCIAL

## 2020-03-29 LAB
ANION GAP SERPL CALCULATED.3IONS-SCNC: 1 MMOL/L (ref 3–14)
BUN SERPL-MCNC: 17 MG/DL (ref 7–30)
CALCIUM SERPL-MCNC: 8.8 MG/DL (ref 8.5–10.1)
CHLORIDE SERPL-SCNC: 106 MMOL/L (ref 94–109)
CO2 SERPL-SCNC: 33 MMOL/L (ref 20–32)
CREAT SERPL-MCNC: 0.8 MG/DL (ref 0.66–1.25)
ERYTHROCYTE [DISTWIDTH] IN BLOOD BY AUTOMATED COUNT: 14.2 % (ref 10–15)
GFR SERPL CREATININE-BSD FRML MDRD: 80 ML/MIN/{1.73_M2}
GLUCOSE BLDC GLUCOMTR-MCNC: 103 MG/DL (ref 70–99)
GLUCOSE SERPL-MCNC: 91 MG/DL (ref 70–99)
HCT VFR BLD AUTO: 35 % (ref 40–53)
HGB BLD-MCNC: 10.9 G/DL (ref 13.3–17.7)
MCH RBC QN AUTO: 27.1 PG (ref 26.5–33)
MCHC RBC AUTO-ENTMCNC: 31.1 G/DL (ref 31.5–36.5)
MCV RBC AUTO: 87 FL (ref 78–100)
PLATELET # BLD AUTO: 220 10E9/L (ref 150–450)
POTASSIUM SERPL-SCNC: 4.5 MMOL/L (ref 3.4–5.3)
RBC # BLD AUTO: 4.02 10E12/L (ref 4.4–5.9)
SODIUM SERPL-SCNC: 140 MMOL/L (ref 133–144)
WBC # BLD AUTO: 11 10E9/L (ref 4–11)

## 2020-03-29 PROCEDURE — 97535 SELF CARE MNGMENT TRAINING: CPT | Mod: GO

## 2020-03-29 PROCEDURE — 94640 AIRWAY INHALATION TREATMENT: CPT | Mod: 76

## 2020-03-29 PROCEDURE — 97116 GAIT TRAINING THERAPY: CPT | Mod: GP | Performed by: PHYSICAL THERAPIST

## 2020-03-29 PROCEDURE — 80048 BASIC METABOLIC PNL TOTAL CA: CPT | Performed by: INTERNAL MEDICINE

## 2020-03-29 PROCEDURE — 94640 AIRWAY INHALATION TREATMENT: CPT

## 2020-03-29 PROCEDURE — 40000275 ZZH STATISTIC RCP TIME EA 10 MIN

## 2020-03-29 PROCEDURE — 99233 SBSQ HOSP IP/OBS HIGH 50: CPT | Performed by: INTERNAL MEDICINE

## 2020-03-29 PROCEDURE — 12000000 ZZH R&B MED SURG/OB

## 2020-03-29 PROCEDURE — 85027 COMPLETE CBC AUTOMATED: CPT | Performed by: INTERNAL MEDICINE

## 2020-03-29 PROCEDURE — 00000146 ZZHCL STATISTIC GLUCOSE BY METER IP

## 2020-03-29 PROCEDURE — 36415 COLL VENOUS BLD VENIPUNCTURE: CPT | Performed by: INTERNAL MEDICINE

## 2020-03-29 PROCEDURE — 25000131 ZZH RX MED GY IP 250 OP 636 PS 637: Performed by: INTERNAL MEDICINE

## 2020-03-29 PROCEDURE — 25000132 ZZH RX MED GY IP 250 OP 250 PS 637: Performed by: INTERNAL MEDICINE

## 2020-03-29 PROCEDURE — 25000125 ZZHC RX 250: Performed by: INTERNAL MEDICINE

## 2020-03-29 PROCEDURE — 25000132 ZZH RX MED GY IP 250 OP 250 PS 637: Performed by: HOSPITALIST

## 2020-03-29 PROCEDURE — 97530 THERAPEUTIC ACTIVITIES: CPT | Mod: GP | Performed by: PHYSICAL THERAPIST

## 2020-03-29 RX ORDER — HYDROXYZINE HYDROCHLORIDE 25 MG/1
25 TABLET, FILM COATED ORAL EVERY 6 HOURS PRN
Status: DISCONTINUED | OUTPATIENT
Start: 2020-03-29 | End: 2020-04-02 | Stop reason: HOSPADM

## 2020-03-29 RX ADMIN — HYDROXYZINE HYDROCHLORIDE 25 MG: 25 TABLET, FILM COATED ORAL at 18:23

## 2020-03-29 RX ADMIN — PREDNISONE 20 MG: 20 TABLET ORAL at 09:30

## 2020-03-29 RX ADMIN — IPRATROPIUM BROMIDE AND ALBUTEROL SULFATE 3 ML: .5; 3 SOLUTION RESPIRATORY (INHALATION) at 14:55

## 2020-03-29 RX ADMIN — IPRATROPIUM BROMIDE AND ALBUTEROL SULFATE 3 ML: .5; 3 SOLUTION RESPIRATORY (INHALATION) at 19:12

## 2020-03-29 RX ADMIN — IPRATROPIUM BROMIDE AND ALBUTEROL SULFATE 3 ML: .5; 3 SOLUTION RESPIRATORY (INHALATION) at 07:03

## 2020-03-29 RX ADMIN — PANTOPRAZOLE SODIUM 40 MG: 40 TABLET, DELAYED RELEASE ORAL at 07:09

## 2020-03-29 RX ADMIN — IPRATROPIUM BROMIDE AND ALBUTEROL SULFATE 3 ML: .5; 3 SOLUTION RESPIRATORY (INHALATION) at 11:04

## 2020-03-29 RX ADMIN — GABAPENTIN 300 MG: 300 CAPSULE ORAL at 09:30

## 2020-03-29 RX ADMIN — METOPROLOL TARTRATE 12.5 MG: 25 TABLET, FILM COATED ORAL at 20:11

## 2020-03-29 RX ADMIN — GABAPENTIN 900 MG: 300 CAPSULE ORAL at 20:10

## 2020-03-29 RX ADMIN — METOPROLOL TARTRATE 12.5 MG: 25 TABLET, FILM COATED ORAL at 09:30

## 2020-03-29 RX ADMIN — PANTOPRAZOLE SODIUM 40 MG: 40 TABLET, DELAYED RELEASE ORAL at 17:16

## 2020-03-29 RX ADMIN — UMECLIDINIUM 1 PUFF: 62.5 AEROSOL, POWDER ORAL at 09:31

## 2020-03-29 RX ADMIN — ATORVASTATIN CALCIUM 10 MG: 10 TABLET, FILM COATED ORAL at 09:30

## 2020-03-29 ASSESSMENT — ACTIVITIES OF DAILY LIVING (ADL)
ADLS_ACUITY_SCORE: 17

## 2020-03-29 ASSESSMENT — MIFFLIN-ST. JEOR: SCORE: 1497.72

## 2020-03-29 NOTE — PLAN OF CARE
Patient arrived to unit from station 66 ~1600. A&Ox4, forgetful. On 5L O2 n/c. Up with assist of 1 and walker. Adequate I/O. Denies pain.

## 2020-03-29 NOTE — PROGRESS NOTES
[facility]  Hospitalist Progress Note  En Malagon MD  03/29/2020    Assessment & Plan   Hermilo Velasquez is a 87 year old male admitted to the intensive care unit on 3/22/2020 with septic vs hypovolemic shock likely secondary to hematemesis and aspiration pneumonia.     Persistent hypoxemia with hx of COPD, NHL and lung cancer  RLE PE  -- has hx of COPD  -- oxygen requirements remain around 5 - 6 liters  -- cxr shows mild basilar atelcectasis  -- CT of chest + RLL PE  -- bilateral LE US negative for DVT  -- hold anticoagulation in setting of recent severe GI bleed.  -- will consult vascular medicine for assistance on timing to resume anticoagulation    Hypovolemic shock   Soft blood pressures   Lactic acidosis   -- Resolved with intravenous fluid and packed red blood cells  -- stable bp and not tachycardic  -- will give NS bolus 500 ml  -- s/p treatment with cefepime and clindamycin for poss aspiration pneumonia     Hematemesis due to acute upper gastrointestinal hemorrhage in the setting of anticoagulation with warfarin   Acute blood loss anemia   History of gastroesophageal reflux disease, gastric and duodenal angiectasias   Patient's wife found him in bed covered in dark emesis with coffee-ground appearance and also had diarrhea.  Hemoglobin 12.8 in ED.  Patient was given 1 unit PRBC in ED.  He also received vitamin K and intravenous proton pump inhibitor.  Of note he had a upper endoscopy July 2018 with normal esophagus, a few 1 to 2 mm nonbleeding angiectasias in the stomach as well as two 2 to 3 mm angiectasias in the second portion of the duodenum.  Colonoscopy at that time also showed non-bleeding angioid ectasias and several polyps.  Hemoglobin down 1 gram overnight but no overt bleeding.    Advance to regular diet    Change to po proton pump inhibitor bid for 4 weeks than daily, prn H2 blocker    Monitor hemoglobin, stabilized and improving 10.6 >> 11.6 > 10.2    INR 1.28,   Repeat INR 1.03    GI Deferring esophagogastroduodenoscopy as patient's GI bleed improving.     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  Respiratory status is stable. Pro-calcitonin was 85 and is down to 50's today.  He has no fever but remains on supplemental oxygen.    Was treated with intravenous cefepime and clindamycin (PCN allergy) for past 5 days, will discontinue    Blood cultures 1/2 CNS    Inhaled bronchodilators as needed; continue umeclidinium    continue prednisone, scheduled nebs, oxygen requirements down to 4L from 8L      Repeat cxr 3/26 showed RLL atelectasis vs infiltrate, continue spirometry     Acute kidney injury     Remove Suazo    Creatinine improved 2 > 1.1.6 > 0.94     NSTEMI, likely type 2 - demand ischemia  Hypertension on metoprolol succinate prior to admission   Patient denies any recent chest pain, palpitations, or shortness of breath however he is a poor historian.  Per his wife he had been feeling at baseline up until this morning.  He reports compliance with his home medications.  Initial troponin 0.58, EKG without signs of overt ischemia.    Transthoracic echocardiogram shows normal LVEF 55-60%, milld LVH, mild LAE    Continue bid metoprolol, will decrease dose with soft bp    No aspirin due to upper gastrointestinal hemorrhage      Paroxysmal atrial fibrillation on warfarin    Continue to hold warfarin temporarily    would not resume unless we have an endoscopic diagnosis, he has hx of AVM    INR 1.06     Hyperlipidemia    Continue PTA statin     Chronic pruritus    PTA Atarax       History of lung cancer and NHL in remission      Diet: advance to regular  DVT Prophylaxis: Pneumatic Compression Devices  Suazo Catheter: in place, indication: Retention  Code Status: Full Code        Disposition Plan  -- therapies working, recommending  "TCU, but patient wants to go home with wife and refusing home care.  -- anticipate on  3/30 or 3/31 with home oxygen      Interval History   -- needing 5-6L oxygen  -- has some dyspnea with ambulation  -- working with therapies    -Data reviewed today: I reviewed all new labs and imaging over the last 24 hours. I personally reviewed no images or EKG's today.    Physical Exam   Heart Rate: 102, Blood pressure 117/68, pulse 95, temperature 98.1  F (36.7  C), temperature source Oral, resp. rate 20, height 1.778 m (5' 10\"), weight 81.6 kg (180 lb), SpO2 95 %.  Vitals:    03/26/20 0600 03/27/20 0500 03/29/20 0654   Weight: 83.2 kg (183 lb 8 oz) 83.3 kg (183 lb 10.3 oz) 81.6 kg (180 lb)     Vital Signs with Ranges  Temp:  [97.9  F (36.6  C)-98.2  F (36.8  C)] 98.1  F (36.7  C)  Pulse:  [86-95] 95  Heart Rate:  [] 102  Resp:  [20-22] 20  BP: (117-134)/(66-75) 117/68  SpO2:  [93 %-98 %] 95 %  I/O's Last 24 hours  I/O last 3 completed shifts:  In: 980 [P.O.:980]  Out: 2610 [Urine:2610]    Constitutional: Awake, alert, cooperative, no apparent distress  Respiratory: Right lung base diminished   Cardiovascular: Regular rate and rhythm, normal S1 and S2, and no murmur noted  GI: Normal bowel sounds, soft, non-distended, non-tender  Skin/Integumen: No rashes, no cyanosis, no edema  Other:      Medications   All medications were reviewed.      atorvastatin  10 mg Oral Daily     gabapentin  300 mg Oral QAM     gabapentin  900 mg Oral At Bedtime     ipratropium - albuterol 0.5 mg/2.5 mg/3 mL  3 mL Nebulization 4x daily     metoprolol tartrate  12.5 mg Oral BID     pantoprazole  40 mg Oral BID AC     predniSONE  20 mg Oral Daily     umeclidinium  1 puff Inhalation Daily        Data   Recent Labs   Lab 03/29/20  0611 03/28/20  0727 03/27/20  0804  03/24/20  1201  03/23/20  0459  03/22/20  1914   WBC 11.0 11.3* 10.7   < >  --    < > 28.1*  --  32.1*   HGB 10.9* 10.2* 10.5*   < >  --    < > 11.6*   < > 12.8*   MCV 87 87 88   < >  " --    < > 87  --  89    192 139*   < >  --    < > 165  --  214   INR  --  1.03  --   --  1.28*  --  1.68*  --  1.93*    136 140   < >  --    < > 139  --  138   POTASSIUM 4.5 4.4 4.6   < >  --    < > 5.0  --  4.7   CHLORIDE 106 105 109   < >  --    < > 111*  --  103   CO2 33* 30 29   < >  --    < > 23  --  26   BUN 17 17 20   < >  --    < > 42*  --  33*   CR 0.80 0.87 0.86   < >  --    < > 2.03*  --  2.57*   ANIONGAP 1* 1* 2*   < >  --    < > 5  --  9   AMRITA 8.8 8.2* 8.0*   < >  --    < > 7.5*  --  8.4*   GLC 91 95 106*   < >  --    < > 148*  --  147*   ALBUMIN  --   --   --   --   --   --  2.2*  --  2.6*   PROTTOTAL  --   --   --   --   --   --  5.4*  --  6.2*   BILITOTAL  --   --   --   --   --   --  0.6  --  0.6   ALKPHOS  --   --   --   --   --   --  55  --  80   ALT  --   --   --   --   --   --  30  --  27   AST  --   --   --   --   --   --  126*  --  101*   LIPASE  --   --   --   --   --   --   --   --  54*   TROPI  --   --   --   --   --   --  0.738*  --  0.580*    < > = values in this interval not displayed.       Recent Results (from the past 24 hour(s))   US Lower Extremity Venous Duplex Bilateral    Narrative    ULTRASOUND BILATERAL LOWER EXTREMITY VENOUS DUPLEX  3/28/2020 1:21 PM     HISTORY: Positive PE on chest CT. Evaluate for source of thrombus.     FINDINGS: The deep veins in the right and left lower extremity are  compressible throughout. The deep veins demonstrate normal venous  augmentation, waveforms and color Doppler flow. No evidence of  superficial thrombophlebitis.      Impression    IMPRESSION: No evidence of right or left lower extremity DVT.    MD En SURESH MD  Text Page  (7am to 6pm)

## 2020-03-29 NOTE — PLAN OF CARE
Patient up with assist of 1 and walker, moving well. Unable to wean patient off 5L O2. Adequate I/O. Denies pain.    Arabic

## 2020-03-29 NOTE — PROGRESS NOTES
SP[facility] Progress Note  [facility]    Initiated visit due to LOS.  Pt was in room alone.  Pt declined visit/prayer at this time.  SH remains available upon pt request.      Lenny Elder  Chaplain Resident

## 2020-03-29 NOTE — PLAN OF CARE
[id number] Pt A/Ox4. VSS on 5 L NC. LS diminished, RLL wheezy. IS encouraged.  Baseline numbness in BLE. Denies pain. Continue to monitor.

## 2020-03-29 NOTE — PLAN OF CARE
Discharge Planner OT   Patient plan for discharge: Home tomorrow    Current status: Pt required SBA with FWW for morning ADL routine standing at sink, retrieval of ADL items within room, toileting task, and functional transfers. O2 sats dropped to 86% on 5L during activity. Slow recovery with seated rest and breathing techniques.     Barriers to return to prior living situation: Stairs; Fall risk; Supplemental O2 needs; Bathtub    Recommendations for discharge: Home with A from wife for bathing and IADLs, [place] transportation, home RN, and home OT    Rationale for recommendations: Pt making gains with daily OT intervention; however, still limited by impaired cognition, safety, balance, and activity tolerance. If level of A is not available at home, pt will need TCU stay.        Entered by: Chuck Armando 03/29/2020 12:42 PM

## 2020-03-29 NOTE — PLAN OF CARE
A&Ox4, forgetful at times. VSS, 5L NC with humidifier, changed to oxymask 5L when sleeping due to mouth breather. Lung sounds diminished, RLL wheezing. PIV SL. A1 with GB and walker. Denied pain. Regular diet. Discharge TBD.

## 2020-03-29 NOTE — PLAN OF CARE
Discharge Planner PT   Patient plan for discharge: Pt did not state  Current status: Pt seated in chair using 5L O2 with sats in 94%. Pt stood from chair wth SBA and used FWW for support. Pt ambulated in bartlett with FWW and SBA using 6L O2 ( no setting at 5L). Pt O2 sats ranged from 86-94%. Pt ambulated 300-350ft with no deviations or LOB. Pt tolerated well. O2 sats 87% after gait and rapidly recovered to 92%. Pt completed seated ex and repeated sit to stand with O2 87-92 with rests to breathe.  Barriers to return to prior living situation: Decreased endurance with high supplemental O2 needs,   Recommendations for discharge: TCU  Rationale for recommendations:Pt continues to have O2 drop with activity while on 6L O2 . Pt needs continued rehab to increase pulmonary endurance to be functional and independent at home. If pt declines TCU, pt needs the necessary support for all mobiltiy and ADLs and uses walker regularly as well as home PT       Entered by: Rabia Urbina 03/29/2020 4:33 PM

## 2020-03-29 NOTE — PROGRESS NOTES
"BRIEF NUTRITION ASSESSMENT    REASON FOR ASSESSMENT:  Hermilo Velasquez is a 87 year old male seen by Registered Dietitian for LOS    NUTRITION HISTORY:  - Called into room per current departmental protocol during COVID-19 Pandemic  - Patient lives at home w/ spouse. She does the cooking. He normally eats meals TID. No issues w/ appetite changes or weight changes.       CURRENT DIET AND INTAKE:  Diet: Regular       Eating % of adequate meals since 3/25. Per review or meals, pt has received 6789-9396 kcal and 77-97 g pro daily over the past three days.     ANTHROPOMETRICS:  Height: 5' 10\"  Weight:  180 lbs 0 oz (81.6 kg)   Body mass index is 25.83 kg/m .   Weight Status: Overweight BMI 25-29.9  IBW:  75.5 kg  %IBW: 108%  Weight History:   Wt Readings from Last 10 Encounters:   03/29/20 81.6 kg (180 lb)   03/18/20 82.1 kg (181 lb)   01/13/20 81.6 kg (180 lb)   12/25/19 77.7 kg (171 lb 3.2 oz)   12/16/19 79.4 kg (175 lb)   12/04/19 79.4 kg (175 lb)   11/29/19 79.6 kg (175 lb 8 oz)   11/06/19 78 kg (172 lb)   10/11/19 75.9 kg (167 lb 6.4 oz)   10/01/19 75.3 kg (166 lb)     LABS:  Reviewed    MALNUTRITION:  Visual Nutrition Focused Physical Assessment (NFPA) deferred. Patient does not meet two of the criteria necessary for diagnosing malnutrition.     NUTRITION INTERVENTION:  Nutrition Diagnosis:  No nutrition diagnosis at this time.    Implementation:  Nutrition Education:  None needed. Pt declined.     FOLLOW UP/MONITORING:   Will re-evaluate in 7 - 10 days, or sooner, if re-consulted.      Nessa Sutton RD, [facility], [address], [facility]   Pager: [phone]  Weekend Pager: [phone]      "

## 2020-03-29 NOTE — PROGRESS NOTES
Patient is on a 5L NC with SpO2 in the low to mid 90's. BS diminished. All nebs were given as ordered.  Will cont to follow.  3/29/2020  Maria C Blunt, RT

## 2020-03-30 ENCOUNTER — APPOINTMENT (OUTPATIENT)
Dept: PHYSICAL THERAPY | Facility: CLINIC | Age: 85
DRG: 871 | End: 2020-03-30
Payer: COMMERCIAL

## 2020-03-30 ENCOUNTER — APPOINTMENT (OUTPATIENT)
Dept: OCCUPATIONAL THERAPY | Facility: CLINIC | Age: 85
DRG: 871 | End: 2020-03-30
Payer: COMMERCIAL

## 2020-03-30 ENCOUNTER — APPOINTMENT (OUTPATIENT)
Dept: CT IMAGING | Facility: CLINIC | Age: 85
DRG: 871 | End: 2020-03-30
Attending: INTERNAL MEDICINE
Payer: COMMERCIAL

## 2020-03-30 LAB
D DIMER PPP FEU-MCNC: 4.6 UG/ML FEU (ref 0–0.5)
NT-PROBNP SERPL-MCNC: 1470 PG/ML (ref 0–1800)

## 2020-03-30 PROCEDURE — 12000000 ZZH R&B MED SURG/OB

## 2020-03-30 PROCEDURE — 25000125 ZZHC RX 250: Performed by: HOSPITALIST

## 2020-03-30 PROCEDURE — 83880 ASSAY OF NATRIURETIC PEPTIDE: CPT | Performed by: INTERNAL MEDICINE

## 2020-03-30 PROCEDURE — 36415 COLL VENOUS BLD VENIPUNCTURE: CPT | Performed by: INTERNAL MEDICINE

## 2020-03-30 PROCEDURE — 94640 AIRWAY INHALATION TREATMENT: CPT

## 2020-03-30 PROCEDURE — 94640 AIRWAY INHALATION TREATMENT: CPT | Mod: 76

## 2020-03-30 PROCEDURE — 97535 SELF CARE MNGMENT TRAINING: CPT | Mod: GO | Performed by: OCCUPATIONAL THERAPY ASSISTANT

## 2020-03-30 PROCEDURE — 25000128 H RX IP 250 OP 636: Performed by: HOSPITALIST

## 2020-03-30 PROCEDURE — 40000275 ZZH STATISTIC RCP TIME EA 10 MIN

## 2020-03-30 PROCEDURE — 85379 FIBRIN DEGRADATION QUANT: CPT | Performed by: INTERNAL MEDICINE

## 2020-03-30 PROCEDURE — 99223 1ST HOSP IP/OBS HIGH 75: CPT | Performed by: INTERNAL MEDICINE

## 2020-03-30 PROCEDURE — 82784 ASSAY IGA/IGD/IGG/IGM EACH: CPT | Performed by: INTERNAL MEDICINE

## 2020-03-30 PROCEDURE — 83883 ASSAY NEPHELOMETRY NOT SPEC: CPT | Performed by: INTERNAL MEDICINE

## 2020-03-30 PROCEDURE — 70470 CT HEAD/BRAIN W/O & W/DYE: CPT

## 2020-03-30 PROCEDURE — 25000125 ZZHC RX 250: Performed by: INTERNAL MEDICINE

## 2020-03-30 PROCEDURE — 97116 GAIT TRAINING THERAPY: CPT | Mod: GP | Performed by: PHYSICAL THERAPY ASSISTANT

## 2020-03-30 PROCEDURE — 97530 THERAPEUTIC ACTIVITIES: CPT | Mod: GO | Performed by: OCCUPATIONAL THERAPY ASSISTANT

## 2020-03-30 PROCEDURE — 99233 SBSQ HOSP IP/OBS HIGH 50: CPT | Performed by: INTERNAL MEDICINE

## 2020-03-30 PROCEDURE — 25000132 ZZH RX MED GY IP 250 OP 250 PS 637: Performed by: HOSPITALIST

## 2020-03-30 PROCEDURE — 97530 THERAPEUTIC ACTIVITIES: CPT | Mod: GP | Performed by: PHYSICAL THERAPY ASSISTANT

## 2020-03-30 PROCEDURE — 86334 IMMUNOFIX E-PHORESIS SERUM: CPT | Performed by: INTERNAL MEDICINE

## 2020-03-30 PROCEDURE — 25000131 ZZH RX MED GY IP 250 OP 636 PS 637: Performed by: INTERNAL MEDICINE

## 2020-03-30 PROCEDURE — 25000132 ZZH RX MED GY IP 250 OP 250 PS 637: Performed by: INTERNAL MEDICINE

## 2020-03-30 RX ORDER — IOPAMIDOL 755 MG/ML
50 INJECTION, SOLUTION INTRAVASCULAR ONCE
Status: COMPLETED | OUTPATIENT
Start: 2020-03-30 | End: 2020-03-30

## 2020-03-30 RX ADMIN — PANTOPRAZOLE SODIUM 40 MG: 40 TABLET, DELAYED RELEASE ORAL at 16:32

## 2020-03-30 RX ADMIN — GABAPENTIN 900 MG: 300 CAPSULE ORAL at 20:33

## 2020-03-30 RX ADMIN — PREDNISONE 20 MG: 20 TABLET ORAL at 08:03

## 2020-03-30 RX ADMIN — SODIUM CHLORIDE 60 ML: 9 INJECTION, SOLUTION INTRAVENOUS at 18:57

## 2020-03-30 RX ADMIN — ATORVASTATIN CALCIUM 10 MG: 10 TABLET, FILM COATED ORAL at 08:03

## 2020-03-30 RX ADMIN — UMECLIDINIUM 1 PUFF: 62.5 AEROSOL, POWDER ORAL at 08:04

## 2020-03-30 RX ADMIN — METOPROLOL TARTRATE 12.5 MG: 25 TABLET, FILM COATED ORAL at 08:03

## 2020-03-30 RX ADMIN — IPRATROPIUM BROMIDE AND ALBUTEROL SULFATE 3 ML: .5; 3 SOLUTION RESPIRATORY (INHALATION) at 11:29

## 2020-03-30 RX ADMIN — IPRATROPIUM BROMIDE AND ALBUTEROL SULFATE 3 ML: .5; 3 SOLUTION RESPIRATORY (INHALATION) at 07:11

## 2020-03-30 RX ADMIN — IPRATROPIUM BROMIDE AND ALBUTEROL SULFATE 3 ML: .5; 3 SOLUTION RESPIRATORY (INHALATION) at 19:35

## 2020-03-30 RX ADMIN — GABAPENTIN 300 MG: 300 CAPSULE ORAL at 08:03

## 2020-03-30 RX ADMIN — METOPROLOL TARTRATE 12.5 MG: 25 TABLET, FILM COATED ORAL at 20:30

## 2020-03-30 RX ADMIN — IOPAMIDOL 50 ML: 755 INJECTION, SOLUTION INTRAVENOUS at 18:57

## 2020-03-30 RX ADMIN — IPRATROPIUM BROMIDE AND ALBUTEROL SULFATE 3 ML: .5; 3 SOLUTION RESPIRATORY (INHALATION) at 15:20

## 2020-03-30 RX ADMIN — PANTOPRAZOLE SODIUM 40 MG: 40 TABLET, DELAYED RELEASE ORAL at 06:59

## 2020-03-30 ASSESSMENT — ACTIVITIES OF DAILY LIVING (ADL)
ADLS_ACUITY_SCORE: 17

## 2020-03-30 ASSESSMENT — MIFFLIN-ST. JEOR: SCORE: 1504.53

## 2020-03-30 NOTE — PROVIDER NOTIFICATION
Spoke to GI Dr. Israel he would like Dr. Luque to call him to discuss plan of care for patient.  His number [phone]    Paged Dr. Luque to update with the above information.

## 2020-03-30 NOTE — PLAN OF CARE
"DATE & TIME: 3/29-30/2020 9016-8662  Cognitive Concerns/ Orientation : A & Ox4  Forgetful. [place].    BEHAVIOR & AGGRESSION TOOL COLOR: Green   ABNL VS/O2: VSS on 3.5-4L NC and oxy mask overnight, mouth breather when sleeping  MOBILITY: SBA w/ gait belt and walker.   PAIN MANAGMENT: Denies  DIET: Regular  BOWEL/BLADDER: continent of B/B using urinal at bedside. No BM \" in a couple days\" per pt report.   ABNL LAB/BG:   DRAIN/DEVICES: PIV SL - L arm  TELEMETRY RHYTHM: N/A  SKIN: Skin tear L arm, CDI. Scattered bruising. Flushed/warm skin.   TESTS/PROCEDURES: na  D/C DAY/GOALS/PLACE: Pending   OTHER IMPORTANT INFO: LS diminished. Denies chest pain/SOB.     Shift Note:  Pt A&Ox4, forgetful at times. VSS on 3-4L NC/Oxymask, ex tachycardic at times and htn. Lung sounds diminished with ex wheezing. PIV SL. Tolerating Regular diet. Up A+1 with GB/W. Denies pain. Continent of B/B. Discharge pending. Continue to monitor.         "

## 2020-03-30 NOTE — PROGRESS NOTES
Shift summar  Hermilo Velasquez with COPD, NHL and lung cancer. 3/28-RLL P.E per chest CT.   Respiratory history: see kasey  Currently on  5 lpm LMP, 02 sats - 97-98%, RR-16.   LUNGS:  Diminished, course on the left    A/p  - continue nebs as ordered   - monitor and wean 02 as tolerated   Christoph Rosnethal, RT

## 2020-03-30 NOTE — PROGRESS NOTES
"Care Coordination:    Following for discharge planning.  Per notes indicates recommendations for TCU vs home w/ home care.  Met with patient and explained role and recommendation. Pt refuses TCU and home care.  \"My wife and I can manage.\"  GI and vascular medicine still following and working on anticoagulation plan. Pt still on oxygen. May need o2 at discharge. Pt is agreeable to this if needed.    CC to follow for oxygen needs at discharge.   Pt voices no other concerns for discharge.     Paz Ceja RN BSN  Inpatient Care Coordination  [facility]    "

## 2020-03-30 NOTE — PROGRESS NOTES
Update from CT that they are aware of patient's active order for head CT, unable to take patient now but will call when able.

## 2020-03-30 NOTE — PROGRESS NOTES
[facility]    Hospitalist Progress Note    Interval History   - Feeling well today, denies any complaints other than slight dyspnea on exertion. Requires O2 at rest and with activity. Denies any bloody or black stools. Denies any pain. He is wanting to return home if possibly.    Assessment & Plan   Summary: Hermilo Velasquez is a 87 year old male with PMH paroxysmal atrial fibrillation, , hx NHL, HLD, GERD, who was admitted on 3/22/2020 with septic versus hypovolemic shock, hematemesis. GI was consulted and this was treated conservatively without EGD as patient had brown stools. Patient treated for aspiration pneumonia as well. COVID-19 was ruled out. Patient with persistent hypoxia and underwent a CT on 3/29/2020 which showed a RLL pulmonary embolism.    Hematemesis due to acute upper GI bleed in setting of anticoagulation with warfarin  Acute blood loss anemia  Hypovolemic shock, resolved  Lactic acidosis, resolved  Patient's wife found him in bed covered in dark emesis with coffee-ground appearance and also had diarrhea.  Hemoglobin 12.8 in ED.  Patient was given 1 unit PRBC in ED.  He also received vitamin K and intravenous proton pump inhibitor.  Of note he had a upper endoscopy July 2018 with normal esophagus, a few 1 to 2 mm nonbleeding angiectasias in the stomach as well as two 2 to 3 mm angiectasias in the second portion of the duodenum.  Colonoscopy at that time also showed non-bleeding angioid ectasias and several polyps. GI consulted this stay, they recommended against repeat EGD. Hgb stable ~11.  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 CT 3/28/2020 showed prior RLL resection, persistent LLL consolidation. Unable to wean off oxygen as of 3/30, probably combination of pneumonia and  pulmonary embolism.Pro-calcitonin was 85, now downtrending. Completed 7 day course of Cefepime, [place] from 3/22 to 3/28.  - PTA prednisone  - Wean O2 as able    RLL pulmonary embolism: Seen on CT 3/28/2020. Was on warfarin prior to this admission which was stopped due to his presentation for UGIB. He did not get an EGD. Risk of rebleed seems high without knowing etiology of UGIB.  - Will ask GI to see patient again given new PE  - Vascular medicine consult pending  - Consider IVC filter    Chronic/Stable/Resolved    NSTEMI, likely type 2 - demand ischemia  Hypertension on metoprolol succinate prior to admission   Patient denies any recent chest pain, palpitations, or shortness of breath however he is a poor historian.  Per his wife he had been feeling at baseline up until this morning.  He reports compliance with his home medications.  Initial troponin 0.58, EKG without signs of overt ischemia. Echo shows normal LVEF 55-60%, milld LVH, mild LAE.  - Metoprolol  - Avoid ASA due to UGIB     Acute kidney injury, resolved.    Paroxysmal atrial fibrillation on warfarin: See above for management  Hyperlipidemia: Continue PTA statin  Chronic pruritus: PTA Atarax    History of lung cancer and NHL in remission      DVT Prophylaxis: Pneumatic Compression Devices  Code Status: Full Code  PT/OT: ordered--rec'd TCU, patient wants to go home  Diet: Regular Diet Adult      Disposition: Expected discharge home in 1-2 days pending anticoagulation decisions    Cecil Luque MD  Text Page  (7am to 6pm)  -Data reviewed today: I reviewed all new labs and imaging results over the last 24 hours.    Physical Exam   Temp: 98.1  F (36.7  C) Temp src: Oral BP: 116/68 Pulse: 92 Heart Rate: 69 Resp: 18 SpO2: 90 % O2 Device: Nasal cannula Oxygen Delivery: 6 LPM  Vitals:    03/27/20 0500 03/29/20 0654 03/30/20 0542   Weight: 83.3 kg (183 lb 10.3 oz) 81.6 kg (180 lb) 82.3 kg (181 lb 8 oz)     Vital Signs with Ranges  Temp:  [97.6  F (36.4   C)-98.1  F (36.7  C)] 98.1  F (36.7  C)  Pulse:  [92] 92  Heart Rate:  [69-96] 69  Resp:  [18] 18  BP: (110-150)/(56-83) 116/68  SpO2:  [86 %-98 %] 90 %  I/O last 3 completed shifts:  In: 640 [P.O.:640]  Out: 1150 [Urine:1150]  O2 requirements: yes    Constitutional: Male in NAD  HEENT: Eyes nonicteric, oral mucosa moist  Cardiovascular: RRR, normal S1/2, no m/r/g  Respiratory: Decreased LLL lung sounds, mild basilar crackles  Vascular: No LE pitting edema  GI: Normoactive bowel sounds, nontender  Skin/Integumen: No rashes  Neuro/Psych: Appropriate affect and mood. A&Ox3, moves all extremities    Medications       atorvastatin  10 mg Oral Daily     gabapentin  300 mg Oral QAM     gabapentin  900 mg Oral At Bedtime     ipratropium - albuterol 0.5 mg/2.5 mg/3 mL  3 mL Nebulization 4x daily     metoprolol tartrate  12.5 mg Oral BID     pantoprazole  40 mg Oral BID AC     predniSONE  20 mg Oral Daily     umeclidinium  1 puff Inhalation Daily       Data   Recent Labs   Lab 03/29/20  0611 03/28/20  0727 03/27/20  0804  03/24/20  1201   WBC 11.0 11.3* 10.7   < >  --    HGB 10.9* 10.2* 10.5*   < >  --    MCV 87 87 88   < >  --     192 139*   < >  --    INR  --  1.03  --   --  1.28*    136 140   < >  --    POTASSIUM 4.5 4.4 4.6   < >  --    CHLORIDE 106 105 109   < >  --    CO2 33* 30 29   < >  --    BUN 17 17 20   < >  --    CR 0.80 0.87 0.86   < >  --    ANIONGAP 1* 1* 2*   < >  --    AMRITA 8.8 8.2* 8.0*   < >  --    GLC 91 95 106*   < >  --     < > = values in this interval not displayed.       Imaging:   No results found for this or any previous visit (from the past 24 hour(s)).

## 2020-03-30 NOTE — PROGRESS NOTES
GI     Called by Dr. Luque with the thoughtful question about timing of EGD for this patient, currently requiring supplemental O2, h/o possible GIB, known diminutive AVMs in the stomach and duodenum in 2018 and recent dx of RLL PE 3/28.  When he presented (3/22) to the hospital there was question of acute GIB with coffee grd emesis without melena, however my colleague at the time did not recommend EGD given lack of clear GIB and pulm status.  Presented on warfarin with INR of 1.9 and since has dropped Hgb ~ 2gm.  Plan for PE currently includes attempt at anticoagulation vs filter placement.  I spoke with Dr. Suazo at the request of Dr. Luque and at least initially, we agree starting anticoagulation trial while inpt would be preferable.  EGD could then be performed if acute/subacute bleed develops.  The unfortunate issue with endoscopic therapy, presumably to treat AVMs, is we would need MAC vs general anesthesia in the setting of PE/increase FiO2 and dramatically increase risk for bleeding with thermal therapy for at least a period of 72 hours.  Dr. Suazo plans to formally review the chart today and we remain available for additional input and will follow to reassess need/appropriateness of EGD.      Please call with questions.     Toby Dong,    [facility]  Cell [phone]

## 2020-03-30 NOTE — PROVIDER NOTIFICATION
Paged GI per hospitalist request, he is asking GI to reaccess if the patient needs EGD before discharging, d/t PE in RLL, talk of IVC filter being placed.  Awaiting call back.

## 2020-03-30 NOTE — PLAN OF CARE
Discharge Planner PT   Patient plan for discharge: Prefers to discharge home  Current status: Pt performed sit to/from stand transfer with CGA and cues for safety.  Gait training x 200 ft using wheeled walker and SBA.  Cues for pacing.  Mild SOB noted following gait but pt reported feeling good.  Pt's O2 sats were 90% pre-gait, dropped to 87% during gait, and recovered to 90% post-gait within 1 minute of resting.  Pt was on 6L O2 throughout session.  Barriers to return to prior living situation: Decreased endurance with high supplemental O2 needs  Recommendations for discharge: TCU per plan established by the PT.   Rationale for recommendations:Pt continues to have O2 drop with activity while on 6L O2 . Pt needs continued rehab to increase pulmonary endurance to be functional and independent at home. If pt declines TCU, pt needs the necessary support for all mobiltiy and ADLs and uses walker regularly as well as home PT       Entered by: Crissy Loaiza 03/30/2020 10:59 AM

## 2020-03-30 NOTE — CONSULTS
Consult Date:  03/30/2020      REQUESTING PHYSICIAN:  Dr. Luque.      REASON FOR VASCULAR MEDICINE CONSULT REQUEST:  Evaluation and management of anticoagulation versus interventional (filter placement) approach in a patient with his second lifetime VTE presenting as recurrent pulmonary emboli without currently documented bilateral lower extremity DVT on current duplex without obvious signs of right heart strain, who has a lifelong indication for anticoagulation secondary to paroxysmal atrial fibrillation who presented with bilateral pulmonary emboli while barely subtherapeutically anticoagulated with warfarin and who has hypercoagulability of malignancy secondary to non-small cell lung carcinoma (theoretically entirely resected) as well as lymphoplasmacytic lymphoma, and additionally has 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  Further complicating matters is the fact that the patient has a bioprosthetic aortic valve replacement, and was admitted with symptoms of GI bleeding but has maintained a stable hgb in the 12 range..      IMPRESSION:     1.  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      The patient was admitted 03/22/2020.  He was ruled out for influenza A, influenza B, as well as coronavirus.  He had persistent hypoxia.  CT of the chest was done.  This revealed, on 03/28/2020, a right lower lobe pulmonary embolism.  He has no evidence of right heart strain on CT scan.  Echocardiogram obtained on 03/24 (when the patient did likely have pulmonary embolus) revealed no evidence of right heart strain.  Venous duplex undertaken on 03/28/2020 reveals absence of DVT bilaterally.  In 2016 the patient had bilateral lower extremity DVT leading to pulmonary embolus.  The  patient was admitted with hematemesis.  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  His hemoglobin has been stable.  GI has consulted on the patient.  Endoscopic therapy to treat AVMs would need MAC versus general anesthesia, which is not optimal in the setting of his current PE.  This would also dramatically increase his risk for bleeding from the thermal therapy for a period of 72 hours.  At present, I therefore would favor the anticoagulation.      My plan is as follows:       a.  Overnight tonight, do not anticoagulate the patient.     b.  Check CT scan of head with and without contrast to rule out intracranial metastases.  An MRI of his brain done 7 months ago apparently revealed no evidence of metastatic disease.  Intracranial bleeding would be much more difficult to deal with than gastrointestinal bleeding.  As such, I favor holding anticoagulation until intracranial metastases could be ruled out.  Once intracranial metastases are ruled out, I would favor anticoagulation.  If the patient develops acute gastrointestinal bleeding then at that time he would have forced our hand clinically to perform endoscopy.  Given the fact that there is a not insignificant risk of recurrent bleeding with thermal therapy, I would prefer to avoid this if at all possible.  Furthermore, clinically, the patient has not shown signs of acute gastrointestinal hemorrhage.  While he has been here for over 6 days, his hemoglobin has been stable in the 12 range.       c.  The choice of anticoagulation is difficult with this patient.  He has already developed a PE while mildly subtherapeutically anticoagulated on warfarin.  The ideal anticoagulant of choice in individuals with hypercoagulability of malignancy is Lovenox.  Recently, Xarelto and Eliquis have been mentioned as reasonable alternatives to Lovenox by the International Society of Thrombosis and Hemostasis as well as the National  [employer].  However, of those 2 NOACs, Xarelto is the preferred agent of choice in hypercoagulability of malignancy.  However, Xarelto causes higher rates of gastrointestinal bleeding compared to warfarin.  In an individual predisposed to gastrointestinal bleeding, I would prefer to have the patient have a lower risk of gastrointestinal bleeding.  Eliquis when compared directly to warfarin has lower rates of gastrointestinal bleeding.  Eliquis is also superior to warfarin in preventing strokes in individuals with nonvalvular AFib.  This patient, however, has a biomechanical prosthetic aortic valve.  This would make Xarelto or Eliquis a less optimal choice compared to Lovenox.  I have not had the opportunity to discuss with the patient whether he would agree to injectable Lovenox.  If he declines injectable Lovenox, then given the multiple comorbidities at play in the same patient simultaneously, I would favor anticoagulation with Eliquis.  Anticoagulating the patient is, however, predicated upon absence of intracranial metastases.  Further statements will be made as to the choice of anticoagulation in this patient when he is seen tomorrow.     d.  This patient does not have DVT presently.  He furthermore has no evidence of right heart strain, presently.  I would therefore not recommend IVC filter placement unless while on anticoagulation as delineated above, the patient sustains acute gastrointestinal hemorrhage.  Also, it should be noted that the apical surface of IVC filter is itself thrombogenic.  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      CHIEF COMPLAINT:  Shortness of breath.      HISTORY OF PRESENT ILLNESS:  As delineated above, the patient who has a history of bioprosthetic aortic valve and chronic atrial fibrillation had been anticoagulated with  warfarin for his paroxysmal atrial fibrillation.  He also has a history of bilateral lower extremity DVTs, leading to PEs in 2016.  Nonetheless, he was maintained on anticoagulation because of his atrial fibrillation.  He presented subtherapeutically anticoagulated with shortness of breath.  He was tested for and ruled out for influenza A, influenza B, and coronavirus.  His shortness of breath persisted.  He was therefore CT scanned and he was found to have a small right lower lobe pulmonary embolus without evidence of right heart strain.  Our input was sought regarding anticoagulation versus IVC filter placement.        REVIEW OF SYSTEMS:  The patient currently denies chest pain, shortness of breath, nausea, vomiting or diarrhea while seated.  The remainder of his 14-point review of systems is within normal limits.      PREVIOUS MEDICAL HISTORY:   1.  Monoclonal gammopathy of unknown significance.   2.  Non-Hodgkin's lymphoma, stage lymphoplasmacytic lymphoma.   3.  Non-small cell lung cancer, stage I Status post pulmonary wedge resection 03/2019.     4.  PE 2016 secondary to bilateral DVTs.   5.  Peripheral neuropathy.   6.  Severe aortic stenosis, status post [employer] Trifecta bovine bioprosthetic 25 mm valve replacement.   7.  Chronic atrial fibrillation.      PREVIOUS SURGICAL HISTORY:   1.  Appendectomy.   2.  Total knee arthroplasty.   3.  Back surgery.   4.  EGD 11/2015, AVMs not cauterized.   5.  EGD 07/26/2018, AVMs not cauterized.   6.  Ventral herniorrhaphy 2013 with mesh.   7.  Right knee arthroscopic surgery.   8.  Right lung lobectomy 03/26/2019.   9.  Ligament repair left tarsal tunnel release 2012.   10.  Aortic valve replacement with bioprosthetic valve 09/2015.   11.  Tonsillectomy.   12.  TURP.      SOCIAL HISTORY:  The patient is a former smoker with a 110-pack-year smoking history who quit smoking in 1998.  He drinks approximately 2 whiskeys per day at home.      FAMILY HISTORY:  Father with  coronary disease and emphysema.      MEDICATIONS PRIOR TO ADMISSION:   1.  Norco.   2.  Albuterol.   3.  Lipitor.   4.  Keflex.   5.  Pepcid.   6.  Iron sulfate.   7.  Neurontin.   8.  Atarax.   9.  Claritin.   10.  Toprol-XL.   11.  Spiriva.   12.  Triamcinolone.   13.  Warfarin.      ALLERGIES:  LIDOCAINE CAUSES BLISTERS, OMEPRAZOLE AND PANTOPRAZOLE CAUSE ITCHING AS DOES PREVACID.  LASIX CAUSES A RASH.  PENICILLIN CAUSES A RASH      PHYSICAL EXAMINATION:   GENERAL:  The patient is awake, alert and oriented.   VITAL SIGNS:  Blood pressure is 116/68, heart rate is 69 and irregularly irregular.  Respiratory rate is 18, pulse ox is 92% on 5 liters OxyMask.  Temperature is 98.1 degrees Fahrenheit.   HEENT:  Oropharynx is within normal limits.   NECK:  No JVD, thyromegaly, lymphadenopathy, or carotid bruits.   LUNGS:  Clear to auscultation bilaterally without rales, wheezes or rhonchi.   HEART:  Irregularly irregular, 1/6 bioprosthetic aortic valve murmur.   GASTROINTESTINAL:  Normoactive bowel sounds, soft, nondistended, nontender.   EXTREMITIES:  Without cyanosis, clubbing or edema.   NEUROLOGIC EXAMINATION:  Nonfocal.   PSYCHIATRIC:  Appropriate mood and affect.      LABORATORY DATA:  D-dimer and N-terminal proBNP currently pending.  Basic metabolic panel with normal electrolytes, normal glucose, normal BUN and creatinine.  White count 11, hemoglobin 10.9, MCV 87, platelet count 220.  INR 1.03.      ASSESMENT AND PLAN:  Please see recommendations as above.         MARIA G CAUSEY MD             D: 2020   T: 2020   MT: JARRED      Name:     CLYDE RODRIGUEZ   MRN:      [id number]        Account:       [id number]   :      1932           Consult Date:  2020      Document: [id number]

## 2020-03-30 NOTE — PLAN OF CARE
Discharge Planner OT   Patient plan for discharge: Home     Current status: pt completed sit to stands and amb with FWW SBA to/from bathroom, toilet transfer with RTS SBA, SBA at sink ADLS. Pt on 6L with O2 destat to 82% with activity, quick recovery to 90% with PLB ~ 20 sec.    Barriers to return to prior living situation: Stairs; Fall risk; Supplemental O2 needs; Bathtub     Recommendations for discharge: Home with A from wife for bathing and IADLs, [place] transportation, home RN, and home OT per plan established by the Occupational Therapist     Rationale for recommendations: Pt making gains with daily OT intervention; however, still limited by impaired cognition, safety, balance, and activity tolerance. If level of A is not available at home, pt will need TCU stay.        Entered by: Radha Almendarez 03/30/2020 11:50 AM

## 2020-03-31 ENCOUNTER — APPOINTMENT (OUTPATIENT)
Dept: PHYSICAL THERAPY | Facility: CLINIC | Age: 85
DRG: 871 | End: 2020-03-31
Payer: COMMERCIAL

## 2020-03-31 ENCOUNTER — APPOINTMENT (OUTPATIENT)
Dept: OCCUPATIONAL THERAPY | Facility: CLINIC | Age: 85
DRG: 871 | End: 2020-03-31
Payer: COMMERCIAL

## 2020-03-31 LAB
IGA SERPL-MCNC: 270 MG/DL (ref 84–499)
IGG SERPL-MCNC: 871 MG/DL (ref 610–1616)
IGM SERPL-MCNC: 85 MG/DL (ref 35–242)
KAPPA LC UR-MCNC: 3.7 MG/DL (ref 0.33–1.94)
KAPPA LC/LAMBDA SER: 1.91 {RATIO} (ref 0.26–1.65)
LAMBDA LC SERPL-MCNC: 1.94 MG/DL (ref 0.57–2.63)
PROT PATTERN SERPL IFE-IMP: NORMAL

## 2020-03-31 PROCEDURE — 25000128 H RX IP 250 OP 636: Performed by: INTERNAL MEDICINE

## 2020-03-31 PROCEDURE — 94640 AIRWAY INHALATION TREATMENT: CPT | Mod: 76

## 2020-03-31 PROCEDURE — 12000000 ZZH R&B MED SURG/OB

## 2020-03-31 PROCEDURE — 99232 SBSQ HOSP IP/OBS MODERATE 35: CPT | Performed by: INTERNAL MEDICINE

## 2020-03-31 PROCEDURE — 25000125 ZZHC RX 250: Performed by: INTERNAL MEDICINE

## 2020-03-31 PROCEDURE — 25000131 ZZH RX MED GY IP 250 OP 636 PS 637: Performed by: INTERNAL MEDICINE

## 2020-03-31 PROCEDURE — 97110 THERAPEUTIC EXERCISES: CPT | Mod: GP | Performed by: PHYSICAL THERAPY ASSISTANT

## 2020-03-31 PROCEDURE — 99233 SBSQ HOSP IP/OBS HIGH 50: CPT | Performed by: INTERNAL MEDICINE

## 2020-03-31 PROCEDURE — 97535 SELF CARE MNGMENT TRAINING: CPT | Mod: GO

## 2020-03-31 PROCEDURE — 94640 AIRWAY INHALATION TREATMENT: CPT

## 2020-03-31 PROCEDURE — 25000132 ZZH RX MED GY IP 250 OP 250 PS 637: Performed by: INTERNAL MEDICINE

## 2020-03-31 PROCEDURE — 25000132 ZZH RX MED GY IP 250 OP 250 PS 637: Performed by: HOSPITALIST

## 2020-03-31 PROCEDURE — 36415 COLL VENOUS BLD VENIPUNCTURE: CPT | Performed by: INTERNAL MEDICINE

## 2020-03-31 PROCEDURE — 97116 GAIT TRAINING THERAPY: CPT | Mod: GP | Performed by: PHYSICAL THERAPY ASSISTANT

## 2020-03-31 PROCEDURE — 85810 BLOOD VISCOSITY EXAMINATION: CPT | Performed by: INTERNAL MEDICINE

## 2020-03-31 PROCEDURE — 40000275 ZZH STATISTIC RCP TIME EA 10 MIN

## 2020-03-31 RX ADMIN — PREDNISONE 20 MG: 20 TABLET ORAL at 08:50

## 2020-03-31 RX ADMIN — UMECLIDINIUM 1 PUFF: 62.5 AEROSOL, POWDER ORAL at 08:51

## 2020-03-31 RX ADMIN — IPRATROPIUM BROMIDE AND ALBUTEROL SULFATE 3 ML: .5; 3 SOLUTION RESPIRATORY (INHALATION) at 07:12

## 2020-03-31 RX ADMIN — GABAPENTIN 900 MG: 300 CAPSULE ORAL at 21:03

## 2020-03-31 RX ADMIN — PANTOPRAZOLE SODIUM 40 MG: 40 TABLET, DELAYED RELEASE ORAL at 06:22

## 2020-03-31 RX ADMIN — ENOXAPARIN SODIUM 80 MG: 80 INJECTION SUBCUTANEOUS at 16:26

## 2020-03-31 RX ADMIN — ATORVASTATIN CALCIUM 10 MG: 10 TABLET, FILM COATED ORAL at 08:50

## 2020-03-31 RX ADMIN — IPRATROPIUM BROMIDE AND ALBUTEROL SULFATE 3 ML: .5; 3 SOLUTION RESPIRATORY (INHALATION) at 19:45

## 2020-03-31 RX ADMIN — PANTOPRAZOLE SODIUM 40 MG: 40 TABLET, DELAYED RELEASE ORAL at 16:26

## 2020-03-31 RX ADMIN — IPRATROPIUM BROMIDE AND ALBUTEROL SULFATE 3 ML: .5; 3 SOLUTION RESPIRATORY (INHALATION) at 15:06

## 2020-03-31 RX ADMIN — METOPROLOL TARTRATE 12.5 MG: 25 TABLET, FILM COATED ORAL at 21:03

## 2020-03-31 RX ADMIN — METOPROLOL TARTRATE 12.5 MG: 25 TABLET, FILM COATED ORAL at 08:50

## 2020-03-31 RX ADMIN — GABAPENTIN 300 MG: 300 CAPSULE ORAL at 08:50

## 2020-03-31 RX ADMIN — IPRATROPIUM BROMIDE AND ALBUTEROL SULFATE 3 ML: .5; 3 SOLUTION RESPIRATORY (INHALATION) at 11:24

## 2020-03-31 ASSESSMENT — ACTIVITIES OF DAILY LIVING (ADL)
ADLS_ACUITY_SCORE: 18
ADLS_ACUITY_SCORE: 17
ADLS_ACUITY_SCORE: 18
ADLS_ACUITY_SCORE: 18

## 2020-03-31 ASSESSMENT — MIFFLIN-ST. JEOR: SCORE: 1503.17

## 2020-03-31 NOTE — PLAN OF CARE
Discharge Planner OT   Patient plan for discharge: Home   Current status: Pt completed clothing item retrieval with SBA/CGA and walker. Pt completed LE dressing with SBA. Educated in depth on energy conservation techniques for I/ADls.   Barriers to return to prior living situation: Cognition, current level of A for I/ADLs  Recommendations for discharge:  Home with 24/7 A/supervision for all I/ADLs (including medication management, bathing, transportation etc) and home OT and home RN.    Rationale for recommendations: Pt is limited by impaired cognition, safety, and activity tolerance. If level of recommended A is not available at home, TCU should be considered.        Entered by: Beatriz Delcid 03/31/2020 3:54 PM

## 2020-03-31 NOTE — PROGRESS NOTES
"[facility] Progress Note     IMPRESSION:  Anemia, established diminutive AVMs in the stomach, duodenum on EGD 2018, possible source of hematemesis last week in the setting of INR 1.9.  Since presentation, was found to have RLL PE, without associated DVT.  Appreciate thoughtful note from Dr. Suazo yesterday regarding LMWH.  Dr. Suazo and I spoke about the patient yesterday, agreeing that we should proceed with anticoagulation for PE, and deal with any bleeding, rather than proceed with endoscopic therapy presumably for AVMs which would further delay anticoagulation for at least another 72 hours.    Therefore, we are following the patient for any evidence of active GI bleeding.     Please also see full consultation from Dr. Page, 3/23.      RECOMMENDATIONS:  Continue diet, PPI, LMWH, check Hgb in the AM    Toby Dong DO   [facility]  Cell [phone]  ________________________________________________________________________      SUBJECTIVE:    Seated, eating lunch.  Feeling well without abd pain or other complaints.  No stool today.  Normal BM yesterday.  Down to 3L FiO2.       OBJECTIVE:  /53 (BP Location: Left arm)   Pulse 92   Temp 97.8  F (36.6  C) (Oral)   Resp 18   Ht 1.778 m (5' 10\")   Wt 82.2 kg (181 lb 3.2 oz)   SpO2 96%   BMI 26.00 kg/m    Temp (24hrs), Av.8  F (36.6  C), Min:97.5  F (36.4  C), Max:98  F (36.7  C)    Patient Vitals for the past 72 hrs:   Weight   20 0654 82.2 kg (181 lb 3.2 oz)   20 0542 82.3 kg (181 lb 8 oz)   20 0654 81.6 kg (180 lb)       Intake/Output Summary (Last 24 hours) at 3/31/2020 1324  Last data filed at 3/31/2020 0852  Gross per 24 hour   Intake 360 ml   Output 1025 ml   Net -665 ml        PHYSICAL EXAM  GEN: Alert, oriented x3, communicative and in NAD.    ABD: ND, +BS, no guarding or pain to palpation, no rebound, no HSM.    Additional Data:  I have reviewed the patient's new clinical lab results:     Recent Labs "   Lab Test 03/29/20  0611 03/28/20  0727 03/27/20  0804  03/24/20  1201  03/23/20  0459   WBC 11.0 11.3* 10.7   < >  --    < > 28.1*   HGB 10.9* 10.2* 10.5*   < >  --    < > 11.6*   MCV 87 87 88   < >  --    < > 87    192 139*   < >  --    < > 165   INR  --  1.03  --   --  1.28*  --  1.68*    < > = values in this interval not displayed.     Recent Labs   Lab Test 03/29/20  0611 03/28/20 0727 03/27/20  0804   POTASSIUM 4.5 4.4 4.6   CHLORIDE 106 105 109   CO2 33* 30 29   BUN 17 17 20   ANIONGAP 1* 1* 2*     Recent Labs   Lab Test 03/23/20  0459 03/22/20  1950 03/22/20  1914 12/22/19  1614 12/22/19  1342  09/03/15  0550   ALBUMIN 2.2*  --  2.6*  --  2.8*   < >  --    BILITOTAL 0.6  --  0.6  --  0.7   < >  --    ALT 30  --  27  --  11   < >  --    *  --  101*  --  11   < >  --    PROTEIN  --  10*  --  10*  --   --  Negative   LIPASE  --   --  54*  --   --   --   --     < > = values in this interval not displayed.

## 2020-03-31 NOTE — PLAN OF CARE
Discharge Planner PT   Patient plan for discharge: Prefers to discharge home  Current status: Pt performed sit to/from stand transfers with SBA and cues for safe hand placement.  Gait training x 450 ft using wheeled walker and SBA.  Mild SOB noted with activity but pt reports feeling good.  O2 sats were 96% at rest.  Unable to get an accurate reading post gait due to lack of signal with the sensor.  Pt was on 3L O2 and appeared to tolerate activity well.  Discussed with nurse.  Barriers to return to prior living situation: Continues to require O2  Recommendations for discharge: Changed to Home with Home PT, after collaboration with the PT.  Rationale for recommendations: Pt is progressing well with mobility and tolerance to activity.  Pt would benefit from Home PT to progress strength and activity tolerance.  Pt requires use of assistive device and would be taxing effort for pt to go out of the house at this point.       Entered by: Crissy Loaiza 03/31/2020 10:05 AM

## 2020-03-31 NOTE — PROGRESS NOTES
[facility]  Vascular Medicine Progress Note         1.  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       Comments:    -Ruled out for influenza A, influenza B, as well as coronavirus.     -CT chest 03/28/2020: RLL pulmonary embolism. No evidence of Rt heart strain on CT scan or TTE obtained 03/24/2020. No biochemical evidence of RT heart strain as BNP is normal. Venous duplex 03/28/2020: absence of DVT bilaterally.      -In 2016 the patient had bilateral lower extremity DVT leading to pulmonary embolus.     -Admitted with hematemesis.  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  Hgb since admission has been stable.  GI has consulted on the patient. Endoscopic therapy to treat AVMs would need MAC versus general anesthesia, which is not optimal in the setting of his current PE.  This would also dramatically increase his risk for bleeding from the thermal therapy for a period of 72 hours.     -CT Head yesterday evening done b/o h/o NSCLCa revealed no intracranial mets.      Plan:     -At present, risk/benefit ratio favors anticoagulation.     A. He understands given recurrent thrombosis in setting of hypercoagulability of two malignancies (lymphoma, NSCLCa), and potential hyperviscosity of MGUS he is likely to thrombose again if not anticoagulated.   B. He furthermore understands that if ACd, he has an increased risk of bleeding from as yet undetermined GI source. He understands his hgb has been stable, and that procedural risks of current EGD with thermal treatment itself places at increased risk of bleeding from the procedure itself, and would also require MAC or GETA which would be suboptimal to put a recent PE patient  through.  C. He also understands that if he develops acute UGIB while ACd, then at that time he would have forced our hand clinically to perform endoscopy.  been here for over 6 days, his hemoglobin has been stable in the 12 range.        -Choice of anticoagulation is difficult with this patient.      A. Ideal AC of choice in individuals with HCOM is Lovenox.  Recently, Xarelto and Eliquis have been mentioned as reasonable alternatives to Lovenox by the ISTH and NCCN.  However, of those 2 NOACs, Xarelto is the preferred agent of choice in HCOM.  However, Xarelto causes higher rates of gastrointestinal bleeding compared to warfarin. Eliquis when compared directly to warfarin has lower rates of gastrointestinal bleeding.  Eliquis is also superior to warfarin in preventing strokes in individuals with nonvalvular AFib.  This patient, however, has a biomechanical prosthetic aortic valve.  This would make Xarelto or Eliquis a less optimal choice compared to Lovenox.    B.He agrees to injectable Lovenox.  Start now, monitor over next 24 hours. If hgb stable , and patient feels well and is functional, could discharge to home tomorrow.    -Duration of AC:    A. Lifelong as long as he is not bleeding. This is due to PAF as well as recurrent VTE. B. Subsequent therapeutic AC if bleeds int the future will have to be tailored to specific risks of rebleeding if ACd based upon characteristics of identified future bleeding sources.     -Regarding IVC filter placement:    A.This patient does not have DVT presently.    B. He does not have right heart strain.    C. I would therefore not recommend IVC filter placement unless while on anticoagulation as delineated above, the patient sustains acute gastrointestinal hemorrhage such that future risk of subsequent bleeding form AC exceeds antithrombotic benefit of AC.  Also, it should be noted that the apical surface of IVC filter is itself thrombogenic.  I would be reluctant in somebody  that has hypercoagulability of 2 malignancies and hyperviscosity secondary to a paraproteinemia to place an IVC filter unless absolutely necessary.                      Interval History:   doing well; no cp, sob, n/v/d, or abd pain, wants to leave .              Review of Systems:   The 10 point Review of Systems is negative other than noted in the HPI             Medications:       atorvastatin  10 mg Oral Daily     gabapentin  300 mg Oral QAM     gabapentin  900 mg Oral At Bedtime     ipratropium - albuterol 0.5 mg/2.5 mg/3 mL  3 mL Nebulization 4x daily     metoprolol tartrate  12.5 mg Oral BID     pantoprazole  40 mg Oral BID AC     predniSONE  20 mg Oral Daily     umeclidinium  1 puff Inhalation Daily                  Physical Exam:     Patient Vitals for the past 24 hrs:   BP Temp Temp src Heart Rate Resp SpO2 Weight   03/31/20 0712 -- -- -- -- -- 96 % --   03/31/20 0654 -- -- -- -- -- -- 181 lb 3.2 oz (82.2 kg)   03/31/20 0104 138/83 97.5  F (36.4  C) Oral 100 16 97 % --   03/30/20 2030 116/52 -- -- 93 -- -- --   03/30/20 1555 128/56 98  F (36.7  C) Oral 69 16 94 % --   03/30/20 0814 -- -- -- -- -- 90 % --   03/30/20 0813 -- -- -- -- -- (!) 89 % --   03/30/20 0812 -- -- -- -- -- (!) 86 % --     Wt Readings from Last 4 Encounters:   03/31/20 181 lb 3.2 oz (82.2 kg)   03/18/20 181 lb (82.1 kg)   01/13/20 180 lb (81.6 kg)   12/25/19 171 lb 3.2 oz (77.7 kg)       Intake/Output Summary (Last 24 hours) at 3/31/2020 4136  Last data filed at 3/31/2020 042  Gross per 24 hour   Intake 1000 ml   Output 1350 ml   Net -350 ml     Constitutional: normal  Eyes: normal  ENT: normal  Neck: Supple, symmetrical, trachea midline, no adenopathy, thyroid symmetric, not enlarged and no tenderness, skin normal.  Hematologic / Lymphatic: normal  Back: normal  Lungs: No increased work of breathing, good air exchange, clear to auscultation bilaterally, no crackles or wheezing.  Cardiovascular: Regular rate and rhythm, normal S1 and  S2, no S3 or S4, and no murmur noted.  Chest / Breast: normal  Abdomen: normal  Genitourinary: normal  Musculoskeletal: No redness, warmth, or swelling of the joints.  Full range of motion noted.  Motor strength is 5 out of 5 all extremities bilaterally.  Tone is normal.  Neurologic: normal  Neuropsychiatric: normal  Skin: normal           Data:     Results for orders placed or performed during the hospital encounter of 03/22/20 (from the past 24 hour(s))   D dimer quantitative   Result Value Ref Range    D Dimer 4.6 (H) 0.0 - 0.50 ug/ml FEU   NT proBNP inpatient and ED   Result Value Ref Range    N-Terminal Pro BNP Inpatient 1,470 0 - 1,800 pg/mL   CT Head w/o & w Contrast    Narrative    CT SCAN OF THE HEAD WITHOUT AND WITH CONTRAST   3/30/2020 7:30 PM     HISTORY: Twelve months status post RLL resection for stage I NSCLCa.  Now has PE; evaluate for new intracranial mets.    TECHNIQUE:  Axial images of the head and coronal reformations without  and with 50mL Isovue-370. Radiation dose for this scan was reduced  using automated exposure control, adjustment of the mA and/or kV  according to patient size, or iterative reconstruction technique.    COMPARISON: None.    FINDINGS: There is no evidence of intracranial hemorrhage, mass, acute  infarct or anomaly. The ventricles are normal in size, shape and  configuration. Mild diffuse parenchymal volume loss. Mild patchy  periventricular white matter hypodensities which are nonspecific, but  likely related to chronic microvascular ischemic disease.     No abnormal contrast enhancement appreciated.     The visualized portions of the sinuses and mastoids appear normal. The  bony calvarium and bones of the skull base appear intact.       Impression    IMPRESSION:     1. No evidence of acute intracranial hemorrhage, mass, or herniation.  2. Mild diffuse parenchymal volume loss and white matter changes  likely due to chronic microvascular ischemic disease.   3. No abnormal  enhancing intracranial lesions to suggest intracranial  metastatic disease.     LAURY NGUYEN MD

## 2020-03-31 NOTE — PLAN OF CARE
A&OX4.  Up with 1 and walker.  Dyspnea on exertion.  Unable to wean O2 today, currently on 5 liters via nasal cannula ranging from 87% to 95%, he fluctuates frequently.  Incentive spirometer used frequently throughout the day.  States he has very mild SOB at rest.  Patient had head CT this evening, results pending.  Plan to restart anticoagulant tomorrow pending CT results.

## 2020-03-31 NOTE — PLAN OF CARE
DATE & TIME: 3/30/2020 7993-2742    Cognitive Concerns/ Orientation : AOx4  Forgetful. [place].    BEHAVIOR & AGGRESSION TOOL COLOR: Green   ABNL VS/O2: VSS on 6L NC and oxy mask overnight, mouth breather when sleeping  MOBILITY: SBA w/ gait belt and walker.   PAIN MANAGMENT: Denies  DIET: Regular  BOWEL/BLADDER: continent of B/B using urinal at bedside.  ABNL LAB/BG:   DRAIN/DEVICES: PIV SL - L arm  TELEMETRY RHYTHM: N/A  SKIN: Skin tear L arm, CDI. Scattered bruising. Flushed/warm skin.   TESTS/PROCEDURES: CT today (3/30)  D/C DAY/GOALS/PLACE: Pending, pt wants to go home, has oxygen needs    OTHER IMPORTANT INFO: LS diminished. Denies chest pain/SOB. Aox4. Pt slept all night, VSS. Was on 6L oxy max at night sating 97%. SBA with walker. Contient BB using urinal overnight.

## 2020-03-31 NOTE — PROGRESS NOTES
[facility]    Hospitalist Progress Note    Interval History   - No acute complaints today, oxygen requirements are down to 3L  - Patient okay with starting Lovenox, will defer starting to Vascular Medicine, monitor for one day, consider discharge if remains stable. Addendum: Lovenox started    Assessment & Plan   Summary: Hermilo Velasquez is a 87 year old male with PMH paroxysmal atrial fibrillation, , hx NHL, HLD, GERD, who was admitted on 3/22/2020 with septic versus hypovolemic shock, hematemesis. GI was consulted and this was treated conservatively without EGD as patient had brown stools. Patient treated for aspiration pneumonia as well. COVID-19 was ruled out. Patient with persistent hypoxia and underwent a CT on 3/29/2020 which showed a RLL pulmonary embolism.    Hematemesis due to acute upper GI bleed in setting of anticoagulation with warfarin  Acute blood loss anemia  Hypovolemic shock, resolved  Lactic acidosis, resolved  Patient's wife found him in bed covered in dark emesis with coffee-ground appearance and also had diarrhea. Hemoglobin 12.8 in ED.  Patient was given 1 unit PRBC in ED.  He also received vitamin K and intravenous proton pump inhibitor.  Of note he had a upper endoscopy July 2018 with normal esophagus, a few 1 to 2 mm nonbleeding angiectasias in the stomach as well as two 2 to 3 mm angiectasias in the second portion of the duodenum.  Colonoscopy at that time also showed non-bleeding angioid ectasias and several polyps. GI consulted this stay, they recommended against repeat EGD. Hgb stable ~11.  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 CT 3/28/2020 showed prior RLL resection, persistent LLL consolidation. Unable to wean off oxygen as of 3/30, probably combination of  pneumonia and pulmonary embolism. Pro-calcitonin was 85, now downtrending. Completed 7 day course of Cefepime, [place] from 3/22 to 3/28.  - PTA prednisone  - Wean O2 as able    RLL pulmonary embolism: Seen on CT 3/28/2020. Was on warfarin prior to this admission which was stopped due to his presentation for UGIB. Has hx of malignancy and bioprosthetic valve which complicates his anticoagulation options. Vascular medicine was consulted and they are recommending Lovenox first, Eliquis as a backup. GI was reconsulted, they think his risk for rebleeding and current O2 needs to high to reconsider EGD.  - Appreciate vascular medicine consult   - Possibly start Lovenox today defer to them    Chronic/Stable/Resolved    NSTEMI, likely type 2 - demand ischemia  Hypertension on metoprolol succinate prior to admission   Patient denies any recent chest pain, palpitations, or shortness of breath however he is a poor historian.  Per his wife he had been feeling at baseline up until this morning.  He reports compliance with his home medications.  Initial troponin 0.58, EKG without signs of overt ischemia. Echo shows normal LVEF 55-60%, milld LVH, mild LAE.  - Metoprolol  - Avoid ASA due to recent bleed     Acute kidney injury, resolved.    Paroxysmal atrial fibrillation on warfarin: See above for management  Hyperlipidemia: Continue PTA statin  Chronic pruritus: PTA Atarax    History of lung cancer and NHL in remission      DVT Prophylaxis: Pneumatic Compression Devices  Code Status: Full Code  PT/OT: ordered--rec'd TCU, patient wants to go home  Diet: Regular Diet Adult      Disposition: Expected discharge home in 1-2 days pending anticoagulation decisions, monitoring    Cecil Luque MD  Text Page  (7am to 6pm)  -Data reviewed today: I reviewed all new labs and imaging results over the last 24 hours.    Physical Exam   Temp: 97.8  F (36.6  C) Temp src: Oral BP: 102/53   Heart Rate: 93 Resp: 18 SpO2: 96 % O2 Device: Nasal  cannula with humidification Oxygen Delivery: 3 LPM  Vitals:    03/29/20 0654 03/30/20 0542 03/31/20 0654   Weight: 81.6 kg (180 lb) 82.3 kg (181 lb 8 oz) 82.2 kg (181 lb 3.2 oz)     Vital Signs with Ranges  Temp:  [97.5  F (36.4  C)-98  F (36.7  C)] 97.8  F (36.6  C)  Heart Rate:  [] 93  Resp:  [16-18] 18  BP: (102-138)/(52-83) 102/53  SpO2:  [94 %-97 %] 96 %  I/O last 3 completed shifts:  In: 1000 [P.O.:1000]  Out: 1845 [Urine:1845]  O2 requirements: yes    Constitutional: Male in NAD  HEENT: Eyes nonicteric, oral mucosa moist  Cardiovascular: RRR, normal S1/2, no m/r/g  Respiratory: Decreased LLL lung sounds, mild basilar crackles  Vascular: 1+ pedal pitting edema  GI: Normoactive bowel sounds, nontender  Skin/Integumen: No rashes  Neuro/Psych: Appropriate affect and mood. A&Ox3, moves all extremities    Medications       atorvastatin  10 mg Oral Daily     gabapentin  300 mg Oral QAM     gabapentin  900 mg Oral At Bedtime     ipratropium - albuterol 0.5 mg/2.5 mg/3 mL  3 mL Nebulization 4x daily     metoprolol tartrate  12.5 mg Oral BID     pantoprazole  40 mg Oral BID AC     predniSONE  20 mg Oral Daily     umeclidinium  1 puff Inhalation Daily       Data   Recent Labs   Lab 03/29/20  0611 03/28/20  0727 03/27/20  0804  03/24/20  1201   WBC 11.0 11.3* 10.7   < >  --    HGB 10.9* 10.2* 10.5*   < >  --    MCV 87 87 88   < >  --     192 139*   < >  --    INR  --  1.03  --   --  1.28*    136 140   < >  --    POTASSIUM 4.5 4.4 4.6   < >  --    CHLORIDE 106 105 109   < >  --    CO2 33* 30 29   < >  --    BUN 17 17 20   < >  --    CR 0.80 0.87 0.86   < >  --    ANIONGAP 1* 1* 2*   < >  --    AMRITA 8.8 8.2* 8.0*   < >  --    GLC 91 95 106*   < >  --     < > = values in this interval not displayed.       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  Now has PE; evaluate for new intracranial mets.    TECHNIQUE:  Axial images of the head and coronal reformations without  and with 50mL Isovue-370. Radiation dose for this scan was reduced  using automated exposure control, adjustment of the mA and/or kV  according to patient size, or iterative reconstruction technique.    COMPARISON: None.    FINDINGS: There is no evidence of intracranial hemorrhage, mass, acute  infarct or anomaly. The ventricles are normal in size, shape and  configuration. Mild diffuse parenchymal volume loss. Mild patchy  periventricular white matter hypodensities which are nonspecific, but  likely related to chronic microvascular ischemic disease.     No abnormal contrast enhancement appreciated.     The visualized portions of the sinuses and mastoids appear normal. The  bony calvarium and bones of the skull base appear intact.       Impression    IMPRESSION:     1. No evidence of acute intracranial hemorrhage, mass, or herniation.  2. Mild diffuse parenchymal volume loss and white matter changes  likely due to chronic microvascular ischemic disease.   3. No abnormal enhancing intracranial lesions to suggest intracranial  metastatic disease.     LAURY NGUYEN MD

## 2020-04-01 ENCOUNTER — DOCUMENTATION ONLY (OUTPATIENT)
Dept: MEDSURG UNIT | Facility: CLINIC | Age: 85
End: 2020-04-01

## 2020-04-01 ENCOUNTER — APPOINTMENT (OUTPATIENT)
Dept: OCCUPATIONAL THERAPY | Facility: CLINIC | Age: 85
DRG: 871 | End: 2020-04-01
Payer: COMMERCIAL

## 2020-04-01 ENCOUNTER — APPOINTMENT (OUTPATIENT)
Dept: PHYSICAL THERAPY | Facility: CLINIC | Age: 85
DRG: 871 | End: 2020-04-01
Payer: COMMERCIAL

## 2020-04-01 LAB
ANION GAP SERPL CALCULATED.3IONS-SCNC: <1 MMOL/L (ref 3–14)
BASOPHILS # BLD AUTO: 0 10E9/L (ref 0–0.2)
BASOPHILS NFR BLD AUTO: 0.2 %
BUN SERPL-MCNC: 25 MG/DL (ref 7–30)
CALCIUM SERPL-MCNC: 8.3 MG/DL (ref 8.5–10.1)
CHLORIDE SERPL-SCNC: 106 MMOL/L (ref 94–109)
CO2 SERPL-SCNC: 32 MMOL/L (ref 20–32)
COVID-19 VIRUS PCR RESULT FROM MDH: NEGATIVE
CREAT SERPL-MCNC: 0.96 MG/DL (ref 0.66–1.25)
DIFFERENTIAL METHOD BLD: ABNORMAL
EOSINOPHIL # BLD AUTO: 0.5 10E9/L (ref 0–0.7)
EOSINOPHIL NFR BLD AUTO: 4.3 %
ERYTHROCYTE [DISTWIDTH] IN BLOOD BY AUTOMATED COUNT: 14.5 % (ref 10–15)
GFR SERPL CREATININE-BSD FRML MDRD: 71 ML/MIN/{1.73_M2}
GLUCOSE SERPL-MCNC: 96 MG/DL (ref 70–99)
HCT VFR BLD AUTO: 33.1 % (ref 40–53)
HGB BLD-MCNC: 10.3 G/DL (ref 13.3–17.7)
IMM GRANULOCYTES # BLD: 0.3 10E9/L (ref 0–0.4)
IMM GRANULOCYTES NFR BLD: 2.2 %
LAB SCANNED RESULT: NORMAL
LYMPHOCYTES # BLD AUTO: 1.2 10E9/L (ref 0.8–5.3)
LYMPHOCYTES NFR BLD AUTO: 10.8 %
MCH RBC QN AUTO: 27.2 PG (ref 26.5–33)
MCHC RBC AUTO-ENTMCNC: 31.1 G/DL (ref 31.5–36.5)
MCV RBC AUTO: 88 FL (ref 78–100)
MONOCYTES # BLD AUTO: 1.4 10E9/L (ref 0–1.3)
MONOCYTES NFR BLD AUTO: 12.4 %
NEUTROPHILS # BLD AUTO: 7.9 10E9/L (ref 1.6–8.3)
NEUTROPHILS NFR BLD AUTO: 70.1 %
NRBC # BLD AUTO: 0 10*3/UL
NRBC BLD AUTO-RTO: 0 /100
PLATELET # BLD AUTO: 277 10E9/L (ref 150–450)
POTASSIUM SERPL-SCNC: 4.2 MMOL/L (ref 3.4–5.3)
RBC # BLD AUTO: 3.78 10E12/L (ref 4.4–5.9)
SODIUM SERPL-SCNC: 138 MMOL/L (ref 133–144)
VISC SER: 1.5 CP (ref 1.4–1.8)
WBC # BLD AUTO: 11.3 10E9/L (ref 4–11)

## 2020-04-01 PROCEDURE — 36415 COLL VENOUS BLD VENIPUNCTURE: CPT | Performed by: INTERNAL MEDICINE

## 2020-04-01 PROCEDURE — 97530 THERAPEUTIC ACTIVITIES: CPT | Mod: GO | Performed by: OCCUPATIONAL THERAPY ASSISTANT

## 2020-04-01 PROCEDURE — 99233 SBSQ HOSP IP/OBS HIGH 50: CPT | Performed by: INTERNAL MEDICINE

## 2020-04-01 PROCEDURE — 25000125 ZZHC RX 250: Performed by: INTERNAL MEDICINE

## 2020-04-01 PROCEDURE — 85025 COMPLETE CBC W/AUTO DIFF WBC: CPT | Performed by: INTERNAL MEDICINE

## 2020-04-01 PROCEDURE — 97116 GAIT TRAINING THERAPY: CPT | Mod: GP | Performed by: PHYSICAL THERAPY ASSISTANT

## 2020-04-01 PROCEDURE — 25000128 H RX IP 250 OP 636: Performed by: INTERNAL MEDICINE

## 2020-04-01 PROCEDURE — 80048 BASIC METABOLIC PNL TOTAL CA: CPT | Performed by: INTERNAL MEDICINE

## 2020-04-01 PROCEDURE — 94640 AIRWAY INHALATION TREATMENT: CPT

## 2020-04-01 PROCEDURE — 97535 SELF CARE MNGMENT TRAINING: CPT | Mod: GO | Performed by: OCCUPATIONAL THERAPY ASSISTANT

## 2020-04-01 PROCEDURE — 97110 THERAPEUTIC EXERCISES: CPT | Mod: GP | Performed by: PHYSICAL THERAPY ASSISTANT

## 2020-04-01 PROCEDURE — 25000132 ZZH RX MED GY IP 250 OP 250 PS 637: Performed by: INTERNAL MEDICINE

## 2020-04-01 PROCEDURE — 94640 AIRWAY INHALATION TREATMENT: CPT | Mod: 76

## 2020-04-01 PROCEDURE — 40000275 ZZH STATISTIC RCP TIME EA 10 MIN

## 2020-04-01 PROCEDURE — 12000000 ZZH R&B MED SURG/OB

## 2020-04-01 PROCEDURE — 25000131 ZZH RX MED GY IP 250 OP 636 PS 637: Performed by: INTERNAL MEDICINE

## 2020-04-01 PROCEDURE — 25000132 ZZH RX MED GY IP 250 OP 250 PS 637: Performed by: HOSPITALIST

## 2020-04-01 RX ADMIN — ENOXAPARIN SODIUM 80 MG: 80 INJECTION SUBCUTANEOUS at 04:57

## 2020-04-01 RX ADMIN — IPRATROPIUM BROMIDE AND ALBUTEROL SULFATE 3 ML: .5; 3 SOLUTION RESPIRATORY (INHALATION) at 07:04

## 2020-04-01 RX ADMIN — IPRATROPIUM BROMIDE AND ALBUTEROL SULFATE 3 ML: .5; 3 SOLUTION RESPIRATORY (INHALATION) at 10:56

## 2020-04-01 RX ADMIN — GABAPENTIN 300 MG: 300 CAPSULE ORAL at 08:32

## 2020-04-01 RX ADMIN — ENOXAPARIN SODIUM 80 MG: 80 INJECTION SUBCUTANEOUS at 17:16

## 2020-04-01 RX ADMIN — ATORVASTATIN CALCIUM 10 MG: 10 TABLET, FILM COATED ORAL at 08:32

## 2020-04-01 RX ADMIN — PREDNISONE 20 MG: 20 TABLET ORAL at 08:32

## 2020-04-01 RX ADMIN — IPRATROPIUM BROMIDE AND ALBUTEROL SULFATE 3 ML: .5; 3 SOLUTION RESPIRATORY (INHALATION) at 15:18

## 2020-04-01 RX ADMIN — IPRATROPIUM BROMIDE AND ALBUTEROL SULFATE 3 ML: .5; 3 SOLUTION RESPIRATORY (INHALATION) at 19:16

## 2020-04-01 RX ADMIN — METOPROLOL TARTRATE 12.5 MG: 25 TABLET, FILM COATED ORAL at 08:32

## 2020-04-01 RX ADMIN — PANTOPRAZOLE SODIUM 40 MG: 40 TABLET, DELAYED RELEASE ORAL at 07:14

## 2020-04-01 RX ADMIN — PANTOPRAZOLE SODIUM 40 MG: 40 TABLET, DELAYED RELEASE ORAL at 17:16

## 2020-04-01 RX ADMIN — UMECLIDINIUM 1 PUFF: 62.5 AEROSOL, POWDER ORAL at 08:33

## 2020-04-01 RX ADMIN — METOPROLOL TARTRATE 12.5 MG: 25 TABLET, FILM COATED ORAL at 20:05

## 2020-04-01 RX ADMIN — GABAPENTIN 900 MG: 300 CAPSULE ORAL at 20:05

## 2020-04-01 ASSESSMENT — ACTIVITIES OF DAILY LIVING (ADL)
ADLS_ACUITY_SCORE: 18
ADLS_ACUITY_SCORE: 17
ADLS_ACUITY_SCORE: 18

## 2020-04-01 NOTE — PLAN OF CARE
Discharge Planner PT   Patient plan for discharge: Home  Current status: Pt performed sit to/from stand transfers with SBA.  Cues for safe hand placement needed.  Gait training x 450 ft using wheeled walker and SBA.  Step length and velocity improving.  Mild SOB noted but pt reports feeling good following gait.  Declined further distance.  Pt's O2 sats were 96% pre-gait, dropped to 89% post-gait, and recovered to 91% after about 1 minute of resting with PLB.    Barriers to return to prior living situation: Continues to require O2  Recommendations for discharge: Home with Home PT, after collaboration with the PT.  Rationale for recommendations: Pt is progressing well with mobility and tolerance to activity.  Pt would benefit from Home PT to progress strength and activity tolerance.  Pt requires use of assistive device and would be taxing effort for pt to go out of the house at this point.       Entered by: Crissy Loaiza 04/01/2020 10:28 AM

## 2020-04-01 NOTE — PROGRESS NOTES
Patient has been assessed for Home Oxygen needs. Oxygen readings:    *Pulse oximetry (SpO2) = 90% on room air at rest while awake.    *SpO2 improved to 96% on 3liters/minute at rest.    *SpO2 = 85% on room air during activity/with exercise.    *SpO2 improved to 92% on 6liters/minute during activity/with exercise.

## 2020-04-01 NOTE — PROGRESS NOTES
[facility]  Hospitalist Progress Note  En Malagon MD  04/01/2020    Assessment & Plan   Hermilo Velasquez is a 87 year old male admitted to the intensive care unit on 3/22/2020 with septic vs hypovolemic shock likely secondary to hematemesis and aspiration pneumonia.     Persistent hypoxemia with hx of COPD, NHL and lung cancer  RLE PE  -- has hx of COPD  -- oxygen requirements coming down to 3L at rest from a few days  -- cxr shows mild basilar atelcectasis  -- CT of chest + RLL PE  -- bilateral LE US negative for DVT  -- seen by vascular medicine, in setting of prior DVT, malignancy, and bioprosthetic heart valve, he has been started on lovenox on 3/31  -- will monitor for blood in stools and serial hgb next 24-48 hrs  He is having brown stools currently       Hematemesis due to acute upper gastrointestinal hemorrhage in the setting of anticoagulation with warfarin   Acute blood loss anemia   History of gastroesophageal reflux disease, gastric and duodenal angiectasias   Patient's wife found him in bed covered in dark emesis with coffee-ground appearance and also had diarrhea.  Hemoglobin 12.8 in ED.  Patient was given 1 unit PRBC in ED.  He also received vitamin K and intravenous proton pump inhibitor.  Of note he had a upper endoscopy July 2018 with normal esophagus, a few 1 to 2 mm nonbleeding angiectasias in the stomach as well as two 2 to 3 mm angiectasias in the second portion of the duodenum.  Colonoscopy at that time also showed non-bleeding angioid ectasias and several polyps.  Hemoglobin down 1 gram overnight but no overt bleeding.    on regular diet    on po proton pump inhibitor bid for 4 weeks than daily, prn H2 blocker    Monitor hemoglobin, stabilized around 10 with brown stools    INR 1.28,  Repeat INR normalized    GI Deferring esophagogastroduodenoscopy as patient's GI bleed improving.     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  Respiratory status is stable. Pro-calcitonin was 85 and is down to 50's today.  He has no fever but remains on supplemental oxygen.    Was treated with intravenous cefepime and clindamycin (PCN allergy) for past 5 days, will discontinue    Blood cultures 1/2 CNS    Inhaled bronchodilators as needed; continue umeclidinium    continue prednisone, scheduled nebs, oxygen requirements down to 3L      Repeat cxr 3/26 showed RLL atelectasis vs infiltrate, CT showed PE     Acute kidney injury     Remove Suazo    Creatinine improved 2 > 1.1.6 > 0.94     NSTEMI, likely type 2 - demand ischemia  Hypertension on metoprolol succinate prior to admission   Patient denies any recent chest pain, palpitations, or shortness of breath however he is a poor historian.  Per his wife he had been feeling at baseline up until this morning.  He reports compliance with his home medications.  Initial troponin 0.58, EKG without signs of overt ischemia.    Transthoracic echocardiogram shows normal LVEF 55-60%, milld LVH, mild LAE    Continue bid metoprolol, will decrease dose with soft bp     Hx of PE  Paroxysmal atrial fibrillation on warfarin    Now changed to lovenox     Hyperlipidemia    Continue PTA statin     Chronic pruritus    PTA Atarax       History of lung cancer and NHL in remission      Diet: advance to regular  DVT Prophylaxis: on Lovenox  Suazo Catheter: in place, indication: Retention  Code Status: Full Code        Disposition Plan  -- started on lovenox  -- monitor hgb for stability and stools  -- anticipate discharge on 4/2 vs 4/3      Interval History   -- chart reviewed  -- GI, vasc medicine consuls reviewed  -- patient ambulating well with walker   -- on 3L at rest.    -Data reviewed today: I reviewed all new labs and imaging over the last 24 hours. I personally reviewed no images or EKG's today.    Physical Exam  "  Heart Rate: 95, Blood pressure 104/63, pulse 96, temperature 98.8  F (37.1  C), temperature source Oral, resp. rate 18, height 1.778 m (5' 10\"), weight 82.2 kg (181 lb 3.2 oz), SpO2 97 %.  Vitals:    03/29/20 0654 03/30/20 0542 03/31/20 0654   Weight: 81.6 kg (180 lb) 82.3 kg (181 lb 8 oz) 82.2 kg (181 lb 3.2 oz)     Vital Signs with Ranges  Temp:  [97.4  F (36.3  C)-98.8  F (37.1  C)] 98.8  F (37.1  C)  Pulse:  [96] 96  Heart Rate:  [82-97] 95  Resp:  [18] 18  BP: ()/(51-63) 104/63  SpO2:  [85 %-98 %] 97 %  I/O's Last 24 hours  I/O last 3 completed shifts:  In: 960 [P.O.:960]  Out: 100 [Urine:100]    Constitutional: Awake, alert, cooperative, no apparent distress  Respiratory: Right lung base diminished   Cardiovascular: Regular rate and rhythm, normal S1 and S2, and no murmur noted  GI: Normal bowel sounds, soft, non-distended, non-tender  Skin/Integumen: No rashes, no cyanosis, no edema  Other:      Medications   All medications were reviewed.      atorvastatin  10 mg Oral Daily     enoxaparin ANTICOAGULANT  1 mg/kg Subcutaneous Q12H     gabapentin  300 mg Oral QAM     gabapentin  900 mg Oral At Bedtime     ipratropium - albuterol 0.5 mg/2.5 mg/3 mL  3 mL Nebulization 4x daily     metoprolol tartrate  12.5 mg Oral BID     pantoprazole  40 mg Oral BID AC     predniSONE  20 mg Oral Daily     umeclidinium  1 puff Inhalation Daily        Data   Recent Labs   Lab 04/01/20  0710 03/29/20  0611 03/28/20  0727   WBC 11.3* 11.0 11.3*   HGB 10.3* 10.9* 10.2*   MCV 88 87 87    220 192   INR  --   --  1.03    140 136   POTASSIUM 4.2 4.5 4.4   CHLORIDE 106 106 105   CO2 32 33* 30   BUN 25 17 17   CR 0.96 0.80 0.87   ANIONGAP <1* 1* 1*   AMRITA 8.3* 8.8 8.2*   GLC 96 91 95       No results found for this or any previous visit (from the past 24 hour(s)).    En Malagon MD  Text Page  (7am to 6pm)       "

## 2020-04-01 NOTE — PROVIDER NOTIFICATION
"Care Coordination:    Continuing discharge planning for this patient.  He has refused TCU and Home Care.  Noted he remains on 3L O2, and has not previously required home oxygen.  Potentially could discharge today.  Sent message to Hospitalist, \"Please add Home Oxygen orders if expected to discharge today, so that this can be arranged in a timely manner. Thank you!\"  And requested \".HomeO2\" documentation from nursing.      ADDENDUM: per MD pt to discharge in 1-2 days.     Provided heads up to:    [facility]   [place] at [address] [place] [address]  Phone: [phone] Toll-free: [phone]        Yamilex Springer RN, BSN, PHN  [facility]   Mobile: [phone]    "

## 2020-04-01 NOTE — PROGRESS NOTES
Received intake for home O2 at 11:31AM from my coworker Cristhian. I called patient's care coordinator Yamilex at 11:40am to let her know I would need F2F and signed order. She told me patient is not discharging until tomorrow, and that she will have provider put them in. I told her I would keep an eye on patient chart to watch for documentation.

## 2020-04-01 NOTE — PLAN OF CARE
Discharge Planner OT   Patient plan for discharge: Home   Current status: pt completed sit to stand SBA, amb with FWW SBA to/from bathroom, toilet transfer with RTS mod I, clothing management with toileting independent, pt stood at sink with SBA, a little unsteady when eyes closed to wash face but no LOB noted. Pt able to doff/don slipper socks independent. Pt tolerated activity well today.   Barriers to return to prior living situation: Cognition, current level of A for I/ADLs  Recommendations for discharge:  Home with 24/7 A/supervision for all I/ADLs (including medication management, bathing, transportation etc) and home OT and home RN per plan established by the Occupational Therapist  Rationale for recommendations: Pt is limited by impaired cognition, safety, and activity tolerance. If level of recommended A is not available at home, TCU should be considered       Entered by: Radha Almendarez 04/01/2020 9:56 AM

## 2020-04-01 NOTE — PROGRESS NOTES
"[facility]  [facility]  Gastroenterology Progress note    Interval History:    No acute events.  Hemoglobin stable at 10.3.  BM without signs of bleeding.    Vital Signs:      /63 (BP Location: Left arm)   Pulse 96   Temp 98.8  F (37.1  C) (Oral)   Resp 18   Ht 1.778 m (5' 10\")   Wt 82.2 kg (181 lb 3.2 oz)   SpO2 96%   BMI 26.00 kg/m    Temp (24hrs), Av.1  F (36.7  C), Min:97.4  F (36.3  C), Max:98.8  F (37.1  C)    Patient Vitals for the past 72 hrs:   Weight   20 0654 82.2 kg (181 lb 3.2 oz)   20 0542 82.3 kg (181 lb 8 oz)       Intake/Output Summary (Last 24 hours) at 2020 0822  Last data filed at 3/31/2020 1520  Gross per 24 hour   Intake 960 ml   Output 100 ml   Net 860 ml         Constitutional: NAD, comfortable  Cardiovascular: RRR, normal S1, S2   Respiratory: CTAB  Abdomen: soft, non-tender, nondistended    Additional Comments:  ROS, FH, SH: See initial GI consult for details.    Laboratory Data:  Recent Labs   Lab Test 20  0710 20  0611 20  0727  20  1201  20  0459   WBC 11.3* 11.0 11.3*   < >  --    < > 28.1*   HGB 10.3* 10.9* 10.2*   < >  --    < > 11.6*   MCV 88 87 87   < >  --    < > 87    220 192   < >  --    < > 165   INR  --   --  1.03  --  1.28*  --  1.68*    < > = values in this interval not displayed.     Recent Labs   Lab Test 20  0710 20  0611 20  0727    140 136   POTASSIUM 4.2 4.5 4.4   CHLORIDE 106 106 105   CO2 32 33* 30   BUN 25 17 17   CR 0.96 0.80 0.87   ANIONGAP <1* 1* 1*   AMRITA 8.3* 8.8 8.2*     Recent Labs   Lab Test 20  0459 20  1950 20  1914 19  1614 19  1342  18  1104  09/03/15  0550 07/30/15  1551   ALBUMIN 2.2*  --  2.6*  --  2.8*   < > 2.6*   < >  --  3.4   BILITOTAL 0.6  --  0.6  --  0.7   < > 0.3   < >  --  0.3   DBIL  --   --   --   --   --   --  0.1  --   --  <0.1   ALT 30  --  27  --  11   < > 14   < >  --  " 23   *  --  101*  --  11   < > 11   < >  --  11   ALKPHOS 55  --  80  --  86   < > 65   < >  --  84   PROTEIN  --  10*  --  10*  --   --   --   --  Negative  --    LIPASE  --   --  54*  --   --   --   --   --   --   --     < > = values in this interval not displayed.         Assessment:  86 yo male with anemia and established history of gastric and duodenal arteriovenous malformations on EGD 2018.  Episode of hematemesis last week in setting of INR 1.9.    Patient found to have RLL PE without associated DVT on presentation.  Decision made to proceed with anticoagulation for PE and watch for signs of active gastrointestinal bleeding.    Plan:  -  Diet as tolerated.  -  Continue PPI.  -  LMWH for treatment of PE.  -  Monitor H/H.  Currently stable.      MARK Carlton  [facility]  Office:  [phone] call if needed after 5PM  Cell:  [phone], not available after 5PM at this number

## 2020-04-01 NOTE — PLAN OF CARE
Pt A/Ox4, forgetful. VSS on 3L NC. Denies pain. Up w  SBA+ Walker. Voiding in urinal.will continue to monitor

## 2020-04-01 NOTE — PROGRESS NOTES
[facility]  Vascular Medicine Progress Note         1.  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       Comments:    -Ruled out for influenza A, influenza B, as well as coronavirus.     -CT chest 03/28/2020: RLL pulmonary embolism. No evidence of Rt heart strain on CT scan or TTE obtained 03/24/2020. No biochemical evidence of RT heart strain as BNP is normal. Venous duplex 03/28/2020: absence of DVT bilaterally.      -In 2016 the patient had bilateral lower extremity DVT leading to pulmonary embolus.     -Admitted with hematemesis.  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  Hgb since admission has been stable.  GI has consulted on the patient. Endoscopic therapy to treat AVMs would need MAC versus general anesthesia, which is not optimal in the setting of his current PE.  This would also dramatically increase his risk for bleeding from the thermal therapy for a period of 72 hours.     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      Plan:     -At present, risk/benefit ratio favors anticoagulation.     A. He understands given recurrent thrombosis in setting of hypercoagulability of two malignancies (lymphoma, NSCLCa), and potential hyperviscosity of MGUS he is likely to thrombose again if not anticoagulated.   B. He furthermore understands that if ACd, he has an increased risk of bleeding from as yet undetermined GI source. He understands his hgb has been stable, and that procedural risks of current EGD with thermal treatment itself places at increased risk of bleeding from the procedure itself, and would also require MAC or GETA which would be suboptimal to put a recent PE patient through.  C. He  also understands that if he develops acute UGIB while ACd, then at that time he would have forced our hand clinically to perform endoscopy.  been here for over 6 days, his hemoglobin has been stable in the 10 range. Currently within that range since starting Lovenox.       -Stable for discharge from my perspective. Appropriate to monitor in hospital one more day to follow hgb. He should see me in the [facility] in June. Order in discharge navigator. We will sign off. Please call [phone] if our input would be needed again.     -Duration of AC:    A. Lifelong as long as he is not bleeding due to PAF as well as recurrent VTE.        -IVC filter placement not presently indicated.                       Interval History:   doing well; no cp, sob, n/v/d, or abd pain, wants to leave .              Review of Systems:   The 10 point Review of Systems is negative other than noted in the HPI             Medications:       atorvastatin  10 mg Oral Daily     enoxaparin ANTICOAGULANT  1 mg/kg Subcutaneous Q12H     gabapentin  300 mg Oral QAM     gabapentin  900 mg Oral At Bedtime     ipratropium - albuterol 0.5 mg/2.5 mg/3 mL  3 mL Nebulization 4x daily     metoprolol tartrate  12.5 mg Oral BID     pantoprazole  40 mg Oral BID AC     predniSONE  20 mg Oral Daily     umeclidinium  1 puff Inhalation Daily                  Physical Exam:     Patient Vitals for the past 24 hrs:   BP Temp Temp src Pulse Heart Rate Resp SpO2   04/01/20 0809 104/63 98.8  F (37.1  C) Oral 96 95 18 96 %   04/01/20 0704 -- -- -- -- -- -- 92 %   04/01/20 0100 113/61 97.4  F (36.3  C) Oral -- 97 18 95 %   03/31/20 1945 -- -- -- -- -- -- 96 %   03/31/20 1940 109/62 98  F (36.7  C) Oral -- 92 18 94 %   03/31/20 1518 95/51 98.5  F (36.9  C) Oral -- 82 18 98 %   03/31/20 1506 -- -- -- -- -- -- 93 %   03/31/20 1124 -- -- -- -- -- -- 96 %   03/31/20 0854 -- -- -- -- -- -- 96 %   03/31/20 0849 102/53 97.8  F (36.6  C) Oral -- 93 18 --     Wt Readings from Last 4  Encounters:   03/31/20 82.2 kg (181 lb 3.2 oz)   03/18/20 82.1 kg (181 lb)   01/13/20 81.6 kg (180 lb)   12/25/19 77.7 kg (171 lb 3.2 oz)       Intake/Output Summary (Last 24 hours) at 3/31/2020 0756  Last data filed at 3/31/2020 0429  Gross per 24 hour   Intake 1000 ml   Output 1350 ml   Net -350 ml     Constitutional: normal  Eyes: normal  ENT: normal  Neck: Supple, symmetrical, trachea midline, no adenopathy, thyroid symmetric, not enlarged and no tenderness, skin normal.  Hematologic / Lymphatic: normal  Back: normal  Lungs: No increased work of breathing, good air exchange, clear to auscultation bilaterally, no crackles or wheezing.  Cardiovascular: Regular rate and rhythm, normal S1 and S2, no S3 or S4, and no murmur noted.  Chest / Breast: normal  Abdomen: normal  Genitourinary: normal  Musculoskeletal: No redness, warmth, or swelling of the joints.  Full range of motion noted.  Motor strength is 5 out of 5 all extremities bilaterally.  Tone is normal.  Neurologic: normal  Neuropsychiatric: normal  Skin: normal           Data:     Results for orders placed or performed during the hospital encounter of 03/22/20 (from the past 24 hour(s))   CBC with platelets differential   Result Value Ref Range    WBC 11.3 (H) 4.0 - 11.0 10e9/L    RBC Count 3.78 (L) 4.4 - 5.9 10e12/L    Hemoglobin 10.3 (L) 13.3 - 17.7 g/dL    Hematocrit 33.1 (L) 40.0 - 53.0 %    MCV 88 78 - 100 fl    MCH 27.2 26.5 - 33.0 pg    MCHC 31.1 (L) 31.5 - 36.5 g/dL    RDW 14.5 10.0 - 15.0 %    Platelet Count 277 150 - 450 10e9/L    Diff Method Automated Method     % Neutrophils 70.1 %    % Lymphocytes 10.8 %    % Monocytes 12.4 %    % Eosinophils 4.3 %    % Basophils 0.2 %    % Immature Granulocytes 2.2 %    Nucleated RBCs 0 0 /100    Absolute Neutrophil 7.9 1.6 - 8.3 10e9/L    Absolute Lymphocytes 1.2 0.8 - 5.3 10e9/L    Absolute Monocytes 1.4 (H) 0.0 - 1.3 10e9/L    Absolute Eosinophils 0.5 0.0 - 0.7 10e9/L    Absolute Basophils 0.0 0.0 - 0.2  10e9/L    Abs Immature Granulocytes 0.3 0 - 0.4 10e9/L    Absolute Nucleated RBC 0.0    Basic metabolic panel   Result Value Ref Range    Sodium 138 133 - 144 mmol/L    Potassium 4.2 3.4 - 5.3 mmol/L    Chloride 106 94 - 109 mmol/L    Carbon Dioxide 32 20 - 32 mmol/L    Anion Gap <1 (L) 3 - 14 mmol/L    Glucose 96 70 - 99 mg/dL    Urea Nitrogen 25 7 - 30 mg/dL    Creatinine 0.96 0.66 - 1.25 mg/dL    GFR Estimate 71 >60 mL/min/[1.73_m2]    GFR Estimate If Black 82 >60 mL/min/[1.73_m2]    Calcium 8.3 (L) 8.5 - 10.1 mg/dL

## 2020-04-02 ENCOUNTER — APPOINTMENT (OUTPATIENT)
Dept: OCCUPATIONAL THERAPY | Facility: CLINIC | Age: 85
DRG: 871 | End: 2020-04-02
Payer: COMMERCIAL

## 2020-04-02 ENCOUNTER — APPOINTMENT (OUTPATIENT)
Dept: PHYSICAL THERAPY | Facility: CLINIC | Age: 85
DRG: 871 | End: 2020-04-02
Payer: COMMERCIAL

## 2020-04-02 ENCOUNTER — DOCUMENTATION ONLY (OUTPATIENT)
Dept: MEDSURG UNIT | Facility: CLINIC | Age: 85
End: 2020-04-02

## 2020-04-02 ENCOUNTER — DOCUMENTATION ONLY (OUTPATIENT)
Dept: FAMILY MEDICINE | Facility: CLINIC | Age: 85
End: 2020-04-02

## 2020-04-02 VITALS
BODY MASS INDEX: 26.24 KG/M2 | HEART RATE: 89 BPM | SYSTOLIC BLOOD PRESSURE: 109 MMHG | OXYGEN SATURATION: 94 % | HEIGHT: 70 IN | TEMPERATURE: 97.7 F | RESPIRATION RATE: 16 BRPM | WEIGHT: 183.3 LBS | DIASTOLIC BLOOD PRESSURE: 64 MMHG

## 2020-04-02 LAB — HGB BLD-MCNC: 10.1 G/DL (ref 13.3–17.7)

## 2020-04-02 PROCEDURE — 25000128 H RX IP 250 OP 636: Performed by: INTERNAL MEDICINE

## 2020-04-02 PROCEDURE — 85018 HEMOGLOBIN: CPT | Performed by: INTERNAL MEDICINE

## 2020-04-02 PROCEDURE — 25000131 ZZH RX MED GY IP 250 OP 636 PS 637: Performed by: INTERNAL MEDICINE

## 2020-04-02 PROCEDURE — 25000125 ZZHC RX 250: Performed by: INTERNAL MEDICINE

## 2020-04-02 PROCEDURE — 97110 THERAPEUTIC EXERCISES: CPT | Mod: GP | Performed by: PHYSICAL THERAPIST

## 2020-04-02 PROCEDURE — 36415 COLL VENOUS BLD VENIPUNCTURE: CPT | Performed by: INTERNAL MEDICINE

## 2020-04-02 PROCEDURE — 99239 HOSP IP/OBS DSCHRG MGMT >30: CPT | Performed by: INTERNAL MEDICINE

## 2020-04-02 PROCEDURE — 94640 AIRWAY INHALATION TREATMENT: CPT

## 2020-04-02 PROCEDURE — 40000275 ZZH STATISTIC RCP TIME EA 10 MIN

## 2020-04-02 PROCEDURE — 97535 SELF CARE MNGMENT TRAINING: CPT | Mod: GO | Performed by: OCCUPATIONAL THERAPY ASSISTANT

## 2020-04-02 PROCEDURE — 97116 GAIT TRAINING THERAPY: CPT | Mod: GP | Performed by: PHYSICAL THERAPIST

## 2020-04-02 PROCEDURE — 25000132 ZZH RX MED GY IP 250 OP 250 PS 637: Performed by: HOSPITALIST

## 2020-04-02 PROCEDURE — 25000132 ZZH RX MED GY IP 250 OP 250 PS 637: Performed by: INTERNAL MEDICINE

## 2020-04-02 RX ORDER — PREDNISONE 5 MG/1
TABLET ORAL
Qty: 25 TABLET | Refills: 0 | Status: SHIPPED | OUTPATIENT
Start: 2020-04-03 | End: 2020-04-13

## 2020-04-02 RX ORDER — PANTOPRAZOLE SODIUM 40 MG/1
40 TABLET, DELAYED RELEASE ORAL
Qty: 60 TABLET | Refills: 1 | Status: SHIPPED | OUTPATIENT
Start: 2020-04-02 | End: 2020-09-18

## 2020-04-02 RX ORDER — METOPROLOL SUCCINATE 25 MG/1
12.5 TABLET, EXTENDED RELEASE ORAL DAILY
Qty: 30 TABLET | Refills: 0 | Status: SHIPPED | OUTPATIENT
Start: 2020-04-02 | End: 2020-09-18

## 2020-04-02 RX ADMIN — UMECLIDINIUM 1 PUFF: 62.5 AEROSOL, POWDER ORAL at 08:39

## 2020-04-02 RX ADMIN — PANTOPRAZOLE SODIUM 40 MG: 40 TABLET, DELAYED RELEASE ORAL at 06:39

## 2020-04-02 RX ADMIN — ENOXAPARIN SODIUM 80 MG: 80 INJECTION SUBCUTANEOUS at 04:57

## 2020-04-02 RX ADMIN — ATORVASTATIN CALCIUM 10 MG: 10 TABLET, FILM COATED ORAL at 08:39

## 2020-04-02 RX ADMIN — METOPROLOL TARTRATE 12.5 MG: 25 TABLET, FILM COATED ORAL at 08:39

## 2020-04-02 RX ADMIN — PREDNISONE 20 MG: 20 TABLET ORAL at 08:39

## 2020-04-02 RX ADMIN — GABAPENTIN 300 MG: 300 CAPSULE ORAL at 08:39

## 2020-04-02 RX ADMIN — IPRATROPIUM BROMIDE AND ALBUTEROL SULFATE 3 ML: .5; 3 SOLUTION RESPIRATORY (INHALATION) at 07:58

## 2020-04-02 ASSESSMENT — ACTIVITIES OF DAILY LIVING (ADL)
ADLS_ACUITY_SCORE: 17

## 2020-04-02 ASSESSMENT — MIFFLIN-ST. JEOR: SCORE: 1512.69

## 2020-04-02 NOTE — PLAN OF CARE
Discharge Planner OT   Patient plan for discharge: Home   Current status:  pt participated with assessment of medication management. Pt was able to complete 6/6 correctly with extra time to read instructions on pill bottle, double check to ensure accuracy with placing pills in correct slot with pill cases.   Barriers to return to prior living situation: Cognition, current level of A for I/ADLs  Recommendations for discharge:  after collaboration with OTR Home with assist as needed for I/ADLs   Rationale for recommendations: pt spouse available to assist pt as needed with ADLS/IADLS. Pt doing well and safe to discharge home with spouse         Entered by: Radha Almendarez 04/02/2020 10:51 AM      Pt to discharge home today, GOALS MET, see discharge summary

## 2020-04-02 NOTE — PLAN OF CARE
VSS on 3L NC. A/Ox4. CMS intact ex doppler bilateral DP pulses. Denies pain. Up with SBA and walker. Tolerating regular diet. Denies N&V. +gas. Voiding adequately. LS diminished. Will continue to monitor.

## 2020-04-02 NOTE — PROGRESS NOTES
Oxygen Documentation:   I certify that this patient, Hermilo Velasquez has been under my care (or a nurse practitioner or physican's assistant working with me). This is the face-to-face encounter for oxygen medical necessity.      Hermilo Velasquez is now in a chronic stable state and continues to require supplemental oxygen. Patient has continued oxygen desaturation due to COPD J44.9.    Alternative treatment(s) tried or considered and deemed clinically infective for treatment of COPD J44.9 include nebulizers, inhalers and steroids.  If portability is ordered, is the patient mobile within the home? yes    **Patients who qualify for home O2 coverage under the CMS guidelines require ABG tests or O2 sat readings obtained closest to, but no earlier than 2 days prior to the discharge, as evidence of the need for home oxygen therapy. Testing must be performed while patient is in the chronic stable state. See notes for O2 sats.**

## 2020-04-02 NOTE — PROGRESS NOTES
"[facility]  [facility]  Gastroenterology Progress note    Interval History:    Tolerating regular diet.  No pain, nausea, vomiting.  No signs of bleeding.    Vital Signs:      /64 (BP Location: Left arm)   Pulse 89   Temp 97.7  F (36.5  C) (Oral)   Resp 16   Ht 1.778 m (5' 10\")   Wt 83.1 kg (183 lb 4.8 oz)   SpO2 94%   BMI 26.30 kg/m    Temp (24hrs), Av.9  F (36.6  C), Min:97.7  F (36.5  C), Max:98.1  F (36.7  C)    Patient Vitals for the past 72 hrs:   Weight   20 0615 83.1 kg (183 lb 4.8 oz)   20 0654 82.2 kg (181 lb 3.2 oz)       Intake/Output Summary (Last 24 hours) at 2020 0847  Last data filed at 2020 0834  Gross per 24 hour   Intake 480 ml   Output 1725 ml   Net -1245 ml         Constitutional: NAD, comfortable  Cardiovascular: RRR, normal S1, S2   Respiratory: CTAB  Abdomen: soft, non-tender, nondistended    Additional Comments:  ROS, FH, SH: See initial GI consult for details.    Laboratory Data:  Recent Labs   Lab Test 20  0702 20  0710 20  0611 20  0727  20  1201  20  0459   WBC  --  11.3* 11.0 11.3*   < >  --    < > 28.1*   HGB 10.1* 10.3* 10.9* 10.2*   < >  --    < > 11.6*   MCV  --  88 87 87   < >  --    < > 87   PLT  --  277 220 192   < >  --    < > 165   INR  --   --   --  1.03  --  1.28*  --  1.68*    < > = values in this interval not displayed.     Recent Labs   Lab Test 20  0710 03/29/20  0611 20  0727    140 136   POTASSIUM 4.2 4.5 4.4   CHLORIDE 106 106 105   CO2 32 33* 30   BUN 25 17 17   CR 0.96 0.80 0.87   ANIONGAP <1* 1* 1*   AMRITA 8.3* 8.8 8.2*     Recent Labs   Lab Test 20  0459 20  1950 20  1914 19  1614 19  1342  18  1104  09/03/15  0550 07/30/15  1551   ALBUMIN 2.2*  --  2.6*  --  2.8*   < > 2.6*   < >  --  3.4   BILITOTAL 0.6  --  0.6  --  0.7   < > 0.3   < >  --  0.3   DBIL  --   --   --   --   --   --  0.1  --   --  <0.1 "   ALT 30  --  27  --  11   < > 14   < >  --  23   *  --  101*  --  11   < > 11   < >  --  11   ALKPHOS 55  --  80  --  86   < > 65   < >  --  84   PROTEIN  --  10*  --  10*  --   --   --   --  Negative  --    LIPASE  --   --  54*  --   --   --   --   --   --   --     < > = values in this interval not displayed.         Assessment:  88 yo male with anemia and established history of gastric and duodenal arteriovenous malformations on EGD 2018.  Episode of hematemesis last week in setting of INR 1.9.    Patient found to have RLL PE without associated DVT on presentation.  Decision made to proceed with anticoagulation for PE and watch for signs of active gastrointestinal bleeding.    No further signs of gastrointestinal bleeding.  Hemoglobin remains stable at 10.1.    Plan:  -  Diet as tolerated.  -  Continue PPI.  -  LMWH for treatment of PE.  -  Monitor H/H.  Currently stable.  -  Will sign off.  Patient should follow up in GI clinic as outpatient for further steps.  Our office will call to arrange.      MARK Carlton  [facility]  Office:  [phone] call if needed after 5PM  Cell:  [phone], not available after 5PM at this number

## 2020-04-02 NOTE — PLAN OF CARE
VSS. 3L NC baseline. A&Ox4. Denies pain. Up SBA walker. Tolerating diet. Some LAROSE, much improved. discharge instructions explained and discharge meds given to pt. discharge home with family at 1410.

## 2020-04-02 NOTE — PROGRESS NOTES
Care Coordination:    Following for discharge planning. Per MD Pt is now agreeable to Home care.   Pt/family was provided with the Medicare Compare list for Home Care.  Discussed associated Medicare star ratings to assist with choice for referrals/discharge planning Yes    Education was given to pt/family that star ratings are updated/maintained by Medicare and can be reviewed by visiting www.medicare.gov Yes    Noted patient has discharge orders/plans for home care including Home (RN/OT/PT)  Patient was given choice in selecting homecare and chose [facility]  Referral sent (4/2) and confirmed.  Provided contact information on AVS for pt to call if they have questions for ([facility] [phone])    Patient also needs home oxygen.  [facility] following for oxygen.  Signed order and face 2 face verified by Jeannette from [facility].  She will be delivering oxygen to the room later today.    Following appointments made and added to AVS.     Apr 13, 2020  2:00 PM CDT   [facility]phone [facility]   [facility]  [address] [address] [phone]    Note: this is not an onsite visit; there is no need to come to the facility.     [facility] at [phone] to schedule a hospital f/u appointment with Dr. Suazo for after June 1, 2020. His office will call you to set.  Talked with Ember at [facility] who will put in to contact patient for follow up at [facility].    Per notes GI will contact patient to arrange follow up.  Numbers added to AVS.    Pt voices no other needs for discharge. Family to  when oxygen arrives.  Bedside nurse updated.       Paz Ceja, RN BSN  Inpatient Care Coordination  [facility]  [phone]

## 2020-04-02 NOTE — PLAN OF CARE
Discharge Planner PT   Patient plan for discharge: Home with HHPT  Current status: pt is independent with transfers, mod independence with gait x350 feet on 4L O2, desats post gait and needs to sit and perform PLB to rebound back to the low 90's. Able to navigate 1 step with a rail to enter home.   Barriers to return to prior living situation: stairs, will now need O2  Recommendations for discharge: Home with HHPT  Rationale for recommendations: Benefit from skilled therapist to assess home environment and manage O2 Lines and assist with monitoring sats.       Entered by: Roxann Lazcano 04/02/2020 10:41 AM      Physical Therapy Discharge Summary    Reason for therapy discharge:    Discharged to home with home therapy.    Progress towards therapy goal(s). See goals on Care Plan in [facility] electronic health record for goal details.  Goals met    Therapy recommendation(s):    Continued therapy is recommended.  Rationale/Recommendations:  continue to monitor activity and O2 sats, will be new to wearing O2 at home.

## 2020-04-02 NOTE — DISCHARGE SUMMARY
[facility]    Discharge Summary  Hospitalist    Date of Admission:  3/22/2020  Date of Discharge:  4/2/2020  Discharging Provider: Charlie Mñuiz  Date of Service (when I saw the patient): 04/02/20      History of Present Illness  From admission H&P  Hermilo Velasquez is a 87 year old male with history of atrial fibrillation on Coumadin, PE/DVT, non-Hodgkin's lymphoma, COPD, chronic pruritus, and prior GI bleed who presented to the ED via EMS with concern of hematemesis and melena.  Patient is a poor historian and is unable to relay the events of today.  However his wife states that he was at his baseline until this morning when she found him in bed covered in dark emesis as well as diarrhea.  There was apparently also appear to be blood on the wall and all over the floor.  He was lethargic and unable to get out of bed.  Prior to this he seems to have been at baseline and taking his medications as directed.  From what I can gather he had not been experiencing any symptoms before this morning-no fevers, chills, shortness breath, chest pain, abdominal pain, nausea, vomiting, or diarrhea.     In the ED he was seen by Dr. Pineda and Ana M Brown CNP with whom I discussed the case.  Patient is afebrile, currently hemodynamically stable with SBP in the low 100s but initially in the 70s, mildly tachycardic in the low 100s, saturating normally on room air.  Multiple lab abnormalities are noted-initial lactic acid 8.1 this is improved to 3.8 after IV fluid resuscitation.  Is a leukocytosis of 32 and hemoglobin of 12.8.  Metabolic panel notable for creatinine of 2.67 (baseline appears to be normal) and BUN of 33.  , ALT 27, total bili 0.6.  Lipase 54.  Occult blood positive.  Initial troponin 0.580.  INR 1.93.  He was typed and crossed and given the history ER staff transfused 1 unit PRBC.  UA not indicative of infection, blood cultures urine culture, procalcitonin, influenza, and Covid testing  pending.  He is at high risk for deterioration and although he has stabilized and somewhat improved will admit him to the ICU.  I have consulted gastroenterology as well as intensivist service.    Hospital Course   Hermilo Velasquez was admitted on 3/22/2020.  The following problems were addressed during his hospitalization:    Persistent hypoxemia with hx of COPD, NHL and lung cancer  RLL PE  Confirmed with CT of the chest, now on O2 given this acute issue and underlying COPE.  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   COVID PCR negative.  compeleted a course of cefepime and clindamycin.  Newly needing supplemental oxygen due to the acute PE and his underlying COPD.   -will discharge on 3L O2 via NC - follow up with PCP  -prednisone taper, 10mg daily x 5 days, then 5mg daily x 5 days then stop  -continue PTA inhalers     Hematemesis due to acute upper gastrointestinal hemorrhage likely due to AVMs and chronic anticoagulation with coumadin  Acute blood loss anemia   History of gastroesophageal reflux disease, gastric and duodenal angiectasias   Hypovolemic shock due to GI hemorrhage  Patient's wife found him in bed covered in dark emesis with coffee-ground appearance and also had diarrhea.  Hemoglobin 12.8 in ED.  Patient was given 1 unit PRBC in ED.  He also received vitamin K and intravenous proton pump inhibitor.  Of note he had a upper endoscopy July 2018 with normal esophagus, a few  1 to 2 mm nonbleeding angiectasias in the stomach as well as two 2 to 3 mm angiectasias in the second portion of the duodenum.  Colonoscopy at that time also showed non-bleeding angioid ectasias and several polyps.  GI Deferring esophagogastroduodenoscopy as patient's GI bleed improving  -on po proton pump inhibitor bid for 4 weeks than daily, prn H2 blocker  -follow up with PCP next week     Acute kidney injury creatinine returned to baseline of 0.9.  Peak creatinine of 2.57      NSTEMI, likely type 2 - demand ischemia  Hypertension on metoprolol succinate prior to admission   Patient denies any recent chest pain, palpitations, or shortness of breath however he is a poor historian.  He reports compliance with his home medications.  Initial troponin 0.58, EKG without signs of overt ischemia.Transthoracic echocardiogram shows normal LVEF 55-60%, milld LVH, mild LAE  -Continue bid metoprolol at reduced dose of 12.5 mg BID     Paroxysmal atrial fibrillation on warfarin  -Now changed to lovenox given PE and GI bleed above  -continue metoprolol     Hyperlipidemia: Continue PTA statin     Chronic pruritus :PTA Atarax       History of lung cancer and NHL in remission     Charlie Muñiz, DO        Pending Results   Unresulted Labs Ordered in the Past 30 Days of this Admission     No orders found from 2/21/2020 to 3/23/2020.          Code Status   Full Code       Primary Care Physician   Karson Bishop    General: sitting in chair, appears comfortable  Cardiovascular: RRR  Pulmonary: minimal wheeze at the bases  GI: +BS, soft, NT/ND  Lymphatics: no edema  Skin: no rash    Discharge Disposition   Discharged to home  Condition at discharge: Stable    Consultations This Hospital Stay   PHARMACY TO DOSE VANCO  INTENSIVIST IP CONSULT  PHARMACY TO DOSE VANCO  GASTROENTEROLOGY IP CONSULT  PHYSICAL THERAPY ADULT IP CONSULT  OCCUPATIONAL THERAPY ADULT IP CONSULT  CARE TRANSITION RN/SW IP CONSULT  WOUND OSTOMY CONTINENCE NURSE   "IP CONSULT  [facility] IP CONSULT    Time Spent on this Encounter   I, Charlie Muñzi DO, personally saw the patient today and spent greater than 30 minutes discharging this patient.    Discharge Orders      Home care nursing referral      Home Care PT Referral for Hospital Discharge      Home Care OT Referral for Hospital Discharge      Discharge Instructions    Prior to discharge, discharging RN to please call [facility] at [phone] to schedule a hospital f/u appointment with Dr. Suazo for after June 1, 2020. If Dr. Suazo's RN is unable to schedule the above presently due to COVID-19 restrictions, please ask that Dr. Suazo's RN fill out an \"orange sheet\" so that the patient is scheduled for the above once COVID-19 restrictions are lifted.     Reason for your hospital stay    Admitted with respiratory issues thought due to pneumonia as well as a new pulmonary embolism.  You were treated with antibiotics and started on blood thinners.  You also had anemia and were seen by GI with recommendations for an acid blocker to continue for at least the next 2 months.     Follow-up and recommended labs and tests     Follow up with PCP in 1 week ++ GI will call to schedule outpatient follow up     Activity    Your activity upon discharge: activity as tolerated     When to contact your care team    If you have worsening shortness of breath, start developing bloody or black stool, throwing up blood.     MD face to face encounter    Documentation of Face to Face and Certification for Home Health Services    I certify that patient: Hermilo Velasquez is under my care and that I, or a nurse practitioner or physician's assistant working with me, had a face-to-face encounter that meets the physician face-to-face encounter requirements with this patient on: 4/2/2020.    This encounter with the patient was in whole, or in part, for the following medical condition, which is the primary reason for home health " care: COPD, PE.    I certify that, based on my findings, the following services are medically necessary home health services: Nursing, Occupational Therapy and Physical Therapy.    My clinical findings support the need for the above services because: Nurse is needed: To assess blood pressure and oxygenation after changes in medications or other medical regimen.., Occupational Therapy Services are needed to assess and treat cognitive ability and address ADL safety due to impairment in cognition and safety. and Physical Therapy Services are needed to assess and treat the following functional impairments: deconditioning.    Further, I certify that my clinical findings support that this patient is homebound (i.e. absences from home require considerable and taxing effort and are for medical reasons or [employer] services or infrequently or of short duration when for other reasons) because: Requires assistance of another person or specialized equipment to access medical services because patient: Is unable to exit home safely on own due to: needing O2...    Based on the above findings. I certify that this patient is confined to the home and needs intermittent skilled nursing care, physical therapy and/or speech therapy.  The patient is under my care, and I have initiated the establishment of the plan of care.  This patient will be followed by a physician who will periodically review the plan of care.  Physician/Provider to provide follow up care: Karson Bishop    Attending hospital physician (the Medicare certified [facility] provider): Charlie Muñiz DO  Physician Signature: See electronic signature associated with these discharge orders.  Date: 4/2/2020     Full Code     Oxygen Adult/Peds    Oxygen Documentation:   I certify that this patient, Hermilo Velasquez has been under my care (or a nurse practitioner or physican's assistant working with me). This is the face-to-face encounter for oxygen medical necessity.       Hermilo Velasquez is now in a chronic stable state and continues to require supplemental oxygen. Patient has continued oxygen desaturation due to COPD J44.9.    Alternative treatment(s) tried or considered and deemed clinically infective for treatment of COPD J44.9 include nebulizers, inhalers and steroids.  If portability is ordered, is the patient mobile within the home? yes    **Patients who qualify for home O2 coverage under the CMS guidelines require ABG tests or O2 sat readings obtained closest to, but no earlier than 2 days prior to the discharge, as evidence of the need for home oxygen therapy. Testing must be performed while patient is in the chronic stable state. See notes for O2 sats.**     Diet    Follow this diet upon discharge: Orders Placed This Encounter      Regular Diet Adult     Discharge Medications   Current Discharge Medication List      START taking these medications    Details   enoxaparin ANTICOAGULANT (LOVENOX) 80 MG/0.8ML syringe Inject 0.8 mLs (80 mg) Subcutaneous every 12 hours  Qty: 60 Syringe, Refills: 3    Associated Diagnoses: Pulmonary embolism, bilateral (H)      pantoprazole (PROTONIX) 40 MG EC tablet Take 1 tablet (40 mg) by mouth 2 times daily (before meals)  Qty: 60 tablet, Refills: 1    Associated Diagnoses: Anemia due to blood loss, acute      predniSONE (DELTASONE) 5 MG tablet Take 2 tablets (10 mg) by mouth daily for 5 days, THEN 1 tablet (5 mg) daily for 5 days.  Qty: 25 tablet, Refills: 0    Associated Diagnoses: Chronic obstructive pulmonary disease, unspecified COPD type (H)         CONTINUE these medications which have CHANGED    Details   metoprolol succinate ER (TOPROL-XL) 25 MG 24 hr tablet Take 0.5 tablets (12.5 mg) by mouth daily  Qty: 30 tablet, Refills: 0    Associated Diagnoses: Benign essential hypertension         CONTINUE these medications which have NOT CHANGED    Details   albuterol (PROAIR HFA/PROVENTIL HFA/VENTOLIN HFA) 108 (90 Base) MCG/ACT Inhaler  Inhale 1-2 puffs into the lungs every 6 hours as needed for shortness of breath / dyspnea or wheezing  Qty: 3 Inhaler, Refills: 5    Comments: PLEASE DISPENSE PROAIR HFA, this is preferred by insurance  Associated Diagnoses: Chronic obstructive pulmonary disease, unspecified COPD type (H)      atorvastatin (LIPITOR) 10 MG tablet Take 1 tablet (10 mg) by mouth daily  Qty: 90 tablet, Refills: 3    Associated Diagnoses: Mixed hyperlipidemia      famotidine (PEPCID) 40 MG tablet Take 1 tablet (40 mg) by mouth 2 times daily as needed (heartburn)  Qty: 180 tablet, Refills: 1    Associated Diagnoses: Gastroesophageal reflux disease without esophagitis      ferrous sulfate (FEROSUL) 325 (65 Fe) MG tablet Take 1 tablet (325 mg) by mouth every other day  Qty: 60 tablet, Refills: 3    Comments: Dose change  Associated Diagnoses: Iron deficiency anemia, unspecified iron deficiency anemia type      !! gabapentin (NEURONTIN) 300 MG capsule Take 300 mg by mouth every morning      !! gabapentin (NEURONTIN) 300 MG capsule Take 900 mg by mouth At Bedtime      HYDROcodone-acetaminophen (NORCO) 5-325 MG tablet Take 1 tablet by mouth every 6 hours as needed for pain  Qty: 18 tablet, Refills: 0    Associated Diagnoses: Injury of left shoulder, initial encounter; Rib pain on left side; Left elbow pain      hydrOXYzine (ATARAX) 25 MG tablet Take 2 tablets (50 mg) by mouth every 6 hours as needed  Qty: 360 tablet, Refills: 3    Associated Diagnoses: Chronic pruritus      loratadine (CLARITIN) 10 MG tablet Take 10 mg by mouth daily as needed for allergies       order for DME Equipment being ordered: Right neoprene knee brace.  ( Fax to [facility] fax  [phone])  Qty: 1 each, Refills: 0    Associated Diagnoses: Chronic pain of right knee      tiotropium (SPIRIVA HANDIHALER) 18 MCG capsule Inhale 1 capsule (18 mcg) into the lungs daily  Qty: 90 capsule, Refills: 3    Associated Diagnoses: Chronic obstructive pulmonary disease,  "unspecified COPD type (H)      triamcinolone (KENALOG) 0.1 % cream Apply topically 2 times daily as needed for irritation       !! - Potential duplicate medications found. Please discuss with provider.      STOP taking these medications       cephALEXin (KEFLEX) 500 MG capsule Comments:   Reason for Stopping:         warfarin ANTICOAGULANT (COUMADIN) 5 MG tablet Comments:   Reason for Stopping:         warfarin ANTICOAGULANT (COUMADIN) 5 MG tablet Comments:   Reason for Stopping:             Allergies   Allergies   Allergen Reactions     Lidocaine Blisters     Allergy to lidocaine ointment     Omeprazole Itching     Pantoprazole Itching     Prevacid [Lansoprazole] Itching     Lasix [Furosemide] Rash     Penicillins Rash     \"broke out from injection\" 60 yrs ago       Data   Most Recent 3 CBC's:  Recent Labs   Lab Test 04/02/20  0702 04/01/20  0710 03/29/20  0611 03/28/20  0727   WBC  --  11.3* 11.0 11.3*   HGB 10.1* 10.3* 10.9* 10.2*   MCV  --  88 87 87   PLT  --  277 220 192      Most Recent 3 BMP's:  Recent Labs   Lab Test 04/01/20  0710 03/29/20  0611 03/28/20  0727    140 136   POTASSIUM 4.2 4.5 4.4   CHLORIDE 106 106 105   CO2 32 33* 30   BUN 25 17 17   CR 0.96 0.80 0.87   ANIONGAP <1* 1* 1*   AMRITA 8.3* 8.8 8.2*   GLC 96 91 95     Most Recent 2 LFT's:  Recent Labs   Lab Test 03/23/20  0459 03/22/20  1914   * 101*   ALT 30 27   ALKPHOS 55 80   BILITOTAL 0.6 0.6     Most Recent INR's and Anticoagulation Dosing History:  Anticoagulation Dose History     Recent Dosing and Labs Latest Ref Rng & Units 1/23/2020 2/6/2020 2/27/2020 3/22/2020 3/23/2020 3/24/2020 3/28/2020    INR 0.86 - 1.14 - - - 1.93(H) 1.68(H) 1.28(H) 1.03    INR 0.86 - 1.14 1.7(A) 2.1(A) 2.0(A) - - - -    INR Point of Care 0.86 - 1.14 - - - - - - -        Most Recent 3 Troponin's:  Recent Labs   Lab Test 03/23/20  0459 03/22/20  1914 07/23/18  1715   TROPI 0.738* 0.580* <0.015     Most Recent Cholesterol Panel:  Recent Labs   Lab Test " 02/13/17  0956   CHOL 126   LDL 69   HDL 44   TRIG 67     Most Recent 6 Bacteria Isolates From Any Culture (See EPIC Reports for Culture Details):  Recent Labs   Lab Test 03/22/20  1950 03/22/20  1941 03/22/20  1935/19  1428 12/22/19  1342 05/15/19  1237   CULT No growth Cultured on the 4th day of incubation:  Staphylococcus capitis  Identification obtained by MALDI-TOF mass spectrometry research use only database. Test   characteristics determined and verified by the [facility]   ([facility]) [place], [place].  *  Critical Value/Significant Value, preliminary result only, called to and read back by  ANNABELLE CABRERA RN  03.26.20 CF    (Note)  POSITIVE for Staphylococci other than S.aureus, S.epidermidis and  S.lugdunensis, by Verigene multiplex nucleic acid test.  Coagulase-negative staphylococci are the most common venipuncture or  collection associated skin CONTAMINANTS grown in blood cultures.  Final identification and antimicrobial susceptibility testing will be  verified by standard methods.    Specimen tested with Verigene multiplex, gram-positive blood culture  nucleic acid test for the following targets: Staph aureus, Staph  epidermidis, Staph lugdunensis, other Staph species, Enterococcus  faecalis, Enterococcus faecium, Streptococcus species, S. agalactiae,  S. anginosus grp., S. pneumoniae, S. pyogenes, Listeria sp., mecA  (methicillin resistance) and Samanta/B (vancomycin resistance).    Critical Value/Significant Value called to and read back by Doreen Silverman RN on 3.26.20 at 0727. bw   No growth No growth Cultured on the 2nd day of incubation:  Staphylococcus hominis  *  Critical Value/Significant Value, preliminary result only, called to and read back by  DAVID GARCIA RN @1019 12/24/19. SCG    (Note)  POSITIVE for Staphylococci other than S.aureus, S.epidermidis and  S.lugdunensis, by Verigene multiplex nucleic acid test.  Coagulase-negative staphylococci  are the most common venipuncture or  collection associated skin CONTAMINANTS grown in blood cultures.  Final identification and antimicrobial susceptibility testing will be  verified by standard methods.    Specimen tested with Verigene multiplex, gram-positive blood culture  nucleic acid test for the following targets: Staph aureus, Staph  epidermidis, Staph lugdunensis, other Staph species, Enterococcus  faecalis, Enterococcus faecium, Streptococcus species, S. agalactiae,  S. anginosus grp., S. pneumoniae, S. pyogenes, Listeria sp., mecA  (methicillin resistance) and Samanta/B (vancomycin resistance).    Critical Value/Significant Value called to and read back by Aurora Giron RN at 1305 12.24.19.DK   No growth     Most Recent TSH, T4 and A1c Labs:  Recent Labs   Lab Test 07/31/18  1104 02/21/18  1124   TSH 2.88 2.38   T4  --  1.21   A1C 5.8*  --      Results for orders placed or performed during the hospital encounter of 03/22/20   CT Chest Abdomen Pelvis w/o Contrast    Narrative    CT CHEST ABDOMEN PELVIS W/O CONTRAST 3/22/2020 8:09 PM    CLINICAL HISTORY: Sepsis    TECHNIQUE: CT scan of the chest, abdomen, and pelvis was performed  without IV contrast. Multiplanar reformats were obtained. Dose  reduction techniques were used.   CONTRAST: None.    COMPARISON: CT chest 3/4/2019, 3/25/2016    FINDINGS:     Evaluation is somewhat limited due to motion artifact.    LUNGS AND PLEURA: Nonocclusive secretions in the trachea. Subsegmental  mucus impaction in the left lower lobe and lingula. There are  postsurgical changes of a peripheral right lower lobe wedge resection.  Moderate centrilobular emphysema. There are reticular, groundglass and  nodular opacities in the left upper and left lower lobe with a few  tiny groundglass and nodular opacities in the dependent right upper  lobe and right lower lobes. Small right pleural effusion. No left  pleural effusion or pneumothorax.    MEDIASTINUM/AXILLAE: Heart is normal  without significant pericardial  effusion. Median sternotomy and aortic valvular repair. Coronary  artery calcifications are noted. The thoracic aorta is normal in  caliber for age with mild calcified atheromatous plaque. No axillary,  mediastinal or obvious hilar lymphadenopathy, though evaluation is  limited in the absence of intravenous contrast. There is a mildly  patulous fluid-filled esophagus.    HEPATOBILIARY: Stable subcentimeter hypodensity in the inferior right  lobe of the liver since at least 2016 and of doubtful clinical  significance. Gallbladder grossly unremarkable.    PANCREAS: Mild fatty atrophy.    SPLEEN: Normal.    ADRENAL GLANDS: Normal.    KIDNEYS/BLADDER: No hydronephrosis. Urinary bladder decompressed about  a Suazo catheter.    BOWEL: No bowel obstruction and.    LYMPH NODES: No abdominal or pelvic lymphadenopathy.    VASCULATURE: Increase in left projecting saccular infrarenal aortic  aneurysm measuring 3.6 x 2.5 cm. Moderate calcified atheromatous  plaque of the abdominal aorta and major branch vessels.    PELVIC ORGANS: Grossly unremarkable.    OTHER: No free fluid or pneumoperitoneum.    MUSCULOSKELETAL: Osteopenia. Mild L5 compression deformity without  significant bony retropulsion. Anterior flowing thoracic osteophytes  of the thoracic spine consistent with DISH.      Impression    IMPRESSION:  1.  Bilateral reticular, groundglass and nodular opacities consistent  with multifocal infectious inflammatory pneumonitis. The distribution  is commonly seen with aspiration. Findings would be considered  uncommon for a novel Coronavirus (COVID-19).   2.  Patulous esophagus.  3.  Trace bilateral pleural effusions  4.  No acute abdominal or pelvic process.  5.  Slight increase in saccular infrarenal abdominal aortic aneurysm  measuring up to 3.6 cm.    KULWINDER SALVADOR MD   XR Chest Port 1 View    Narrative    CHEST ONE VIEW  3/26/2020 12:20 PM     HISTORY: Increase oxygen  requirement.    COMPARISON: December 22, 2019      Impression    IMPRESSION: Mild left lower lobe atelectasis and/or infiltrate. Right  lung clear.    FUENTES SIMON MD   CT Chest w Contrast     Value    Radiologist flags Pulmonary embolism (AA)    Narrative    CT CHEST WITH CONTRAST  3/28/2020 12:17 PM     HISTORY: Dyspnea, chronic, negative or nondiagnostic x-ray.    COMPARISON: Noncontrast CT chest 3/22/2020.    TECHNIQUE: Thin section axial images are performed from the thoracic  inlet to the lung bases utilizing 79 mL of Isovue 370 IV contrast  without adverse event. Coronal reformatted images are also generated.  Radiation dose for this scan was reduced using automated exposure  control, adjustment of the mA and/or kV according to patient size, or  iterative reconstruction technique.    FINDINGS:  Emphysema is present. Postsurgical changes right lung base  are noted. Posterior right lung base consolidation is noted.  Previously noted left upper lobe and left lower lobe infiltrate have  otherwise improved. Right lung remains clear. Small right and trace  left pleural effusions are stable. No pericardial fluid. Heart is  normal in size. Prior aortic valve replacement. No enlarged axillary  lymph nodes. A few small mediastinal nodes are present but are not  enlarged by CT criteria. Thoracic aorta demonstrates calcified plaque  without aneurysm or dissection. Right lower lobe pulmonary artery  filling defect is consistent with right lower lobe pulmonary embolism.  No other emboli are appreciated. Limited images upper abdomen  demonstrate a few indeterminate subcentimeter low-attenuation liver  lesions, probably cysts but are too small to definitively  characterize. Upper abdomen is otherwise unremarkable. Degenerative  spine changes are present. Bones appear osteopenic.      Impression    IMPRESSION:  1. Right lower lobe pulmonary embolism. No other emboli are  appreciated. Thoracic aorta is unremarkable.  2.  Postop changes related to partial right lower lobe lung resection  and aortic valve replacement.  3. Persistent posterior left lung base consolidation with significant  improvement in the left lung infiltrate seen on prior CT. Small right  and trace left pleural effusions are stable.  4. Indeterminate subcentimeter low-attenuation liver lesions, probably  cysts but too small to characterize on CT.    [Critical Result: Pulmonary embolism]    Finding was identified on 3/28/2020 12:18 PM.     The patient's nurse Tessie was contacted by me on 3/28/2020 12:25 PM and  verbalized understanding of the critical result.     JOE HEART MD   US Lower Extremity Venous Duplex Bilateral    Narrative    ULTRASOUND BILATERAL LOWER EXTREMITY VENOUS DUPLEX  3/28/2020 1:21 PM     HISTORY: Positive PE on chest CT. Evaluate for source of thrombus.     FINDINGS: The deep veins in the right and left lower extremity are  compressible throughout. The deep veins demonstrate normal venous  augmentation, waveforms and color Doppler flow. No evidence of  superficial thrombophlebitis.      Impression    IMPRESSION: No evidence of right or left lower extremity DVT.    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  Now has PE; evaluate for new intracranial mets.    TECHNIQUE:  Axial images of the head and coronal reformations without  and with 50mL Isovue-370. Radiation dose for this scan was reduced  using automated exposure control, adjustment of the mA and/or kV  according to patient size, or iterative reconstruction technique.    COMPARISON: None.    FINDINGS: There is no evidence of intracranial hemorrhage, mass, acute  infarct or anomaly. The ventricles are normal in size, shape and  configuration. Mild diffuse parenchymal volume loss. Mild patchy  periventricular white matter hypodensities which  are nonspecific, but  likely related to chronic microvascular ischemic disease.     No abnormal contrast enhancement appreciated.     The visualized portions of the sinuses and mastoids appear normal. The  bony calvarium and bones of the skull base appear intact.       Impression    IMPRESSION:     1. No evidence of acute intracranial hemorrhage, mass, or herniation.  2. Mild diffuse parenchymal volume loss and white matter changes  likely due to chronic microvascular ischemic disease.   3. No abnormal enhancing intracranial lesions to suggest intracranial  metastatic disease.     LAURY NGUYEN MD   Echocardiogram Complete    Narrative    [id number]  [id number]  [id number]^CK^MARIA G^ОЛЬГА           [facility]  Echocardiography Laboratory  [address] [place] [address]        Name: CLYDE RODRIGUEZ  MRN: [id number]  : 1932  Study Date: 2020 01:44 PM  Age: 87 yrs  Gender: Male  Patient Location: [facility]  Reason For Study: SOB  Ordering Physician: MARIA G STOVER  Referring Physician: MARIA G STOVER  Performed By: JORDYN Holland     BSA: 2.0 m2  Height: 70 in  Weight: 182 lb  HR: 69  BP: 119/58 mmHg  _____________________________________________________________________________  __        Procedure  Complete Portable Echo Adult. Optison (NDC #[id number]) given intravenously.  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  The gradient is normal for this prosthetic aortic valve.  Left ventricular systolic function is normal.  The visual ejection fraction is estimated at 55-60%.  There is mild concentric left ventricular hypertrophy.  The left ventricle is normal in size.  The left atrium is mild to moderately dilated.     No significant change since 2018.     _____________________________________________________________________________  __         Left Ventricle  The left ventricle is normal in size. There is mild concentric left  ventricular hypertrophy. Left ventricular systolic function is normal. The  visual ejection fraction is estimated at 55-60%. Grade II or moderate  diastolic dysfunction. No regional wall motion abnormalities noted. There is  no thrombus seen in the left ventricle.     Right Ventricle  The right ventricle is normal in structure, function and size. There is no  mass or thrombus in the right ventricle.     Atria  The left atrium is mild to moderately dilated. Right atrial size is normal.  There is no atrial shunt seen. The left atrial appendage is not well  visualized.     Mitral Valve  There is moderate mitral annular calcification. There is no mitral  regurgitation noted. There is no mitral valve stenosis.        Tricuspid Valve  Normal tricuspid valve. No tricuspid regurgitation. Right ventricular systolic  pressure could not be approximated due to inadequate tricuspid regurgitation.  There is no tricuspid stenosis.     Aortic Valve  No aortic regurgitation is present. The mean AoV pressure gradient is 12.2  mmHg. 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 The gradient is normal for this  prosthetic aortic valve.     Pulmonic Valve  The pulmonic valve is not well visualized. There is no pulmonic valvular  regurgitation. There is no pulmonic valvular stenosis.     Vessels  The aortic root is normal size. Normal size ascending aorta. Inferior vena  cava not well visualized for estimation of right atrial pressure. The  pulmonary artery is normal size.     Pericardium  The pericardium appears normal. There is no pleural effusion.        Rhythm  Sinus rhythm was noted.  _____________________________________________________________________________  __  MMode/2D Measurements & Calculations  IVSd: 1.3 cm     LVIDd: 4.4 cm  LVIDs: 3.2 cm  LVPWd: 1.4 cm  FS: 26.7 %  LV mass(C)d: 236.7 grams  LV mass(C)dI: 118.0 grams/m2  Ao root diam: 3.1  cm  LA dimension: 4.6 cm  asc Aorta Diam: 3.3 cm  LA/Ao: 1.5  LVOT diam: 2.2 cm  LVOT area: 3.9 cm2  LA Volume (BP): 77.9 ml  LA Volume Indexed (AL/bp): 36.3 ml/m2  RWT: 0.66           Doppler Measurements & Calculations  MV E max santi: 93.4 cm/sec  MV A max santi: 65.1 cm/sec  MV E/A: 1.4  MV max P.3 mmHg  MV mean P.5 mmHg  MV V2 VTI: 38.3 cm  MVA(VTI): 2.0 cm2  MV dec time: 0.25 sec  Ao V2 max: 229.3 cm/sec  Ao max P.0 mmHg  Ao V2 mean: 166.7 cm/sec  Ao mean P.2 mmHg  Ao V2 VTI: 50.5 cm  АННА(I,D): 1.5 cm2  АННА(V,D): 1.5 cm2  LV V1 max PG: 3.3 mmHg  LV V1 max: 90.4 cm/sec  LV V1 VTI: 19.4 cm  SV(LVOT): 74.9 ml  SI(LVOT): 37.4 ml/m2  PA acc time: 0.12 sec  AV Santi Ratio (DI): 0.39  АННА Index (cm2/m2): 0.74  E/E' avg: 15.5  Lateral E/e': 8.9  Medial E/e': 22.2              _____________________________________________________________________________  __        Report approved by: Dr. Alexander Moses 2020 04:33 PM

## 2020-04-02 NOTE — DISCHARGE INSTRUCTIONS
Oxygen Provider:  Arranged through [employer], contact number [phone]. If you have any questions or concerns please call the oxygen company directly.

## 2020-04-02 NOTE — PROGRESS NOTES
ANTICOAGULATION  MANAGEMENT: Discharge Review    Hermilo Velasquez chart reviewed for anticoagulation continuity of care    Hospital Admission on 3/27/20 for pneumonia and PE .    Discharge disposition: Home with Home Care    Results:    Recent Labs   Lab Test 03/28/20  0727   INR 1.03       Anticoagulation inpatient management:     held warfarin due to GI bleed      Anticoagulation discharge instructions:     Warfarin dosing: warfarin discontinued   Bridging: bridging with enoxaparin (Lovenox)   INR goal change: Yes: taken of warfarin at this time       Medication changes affecting anticoagulation: No    Additional factors affecting anticoagulation: No    Plan     No adjustment to anticoagulation plan needed    Patient not contacted    patient is off warfarin at this time due to GI Bleed and PE    Tammy Peters RN

## 2020-04-02 NOTE — PROGRESS NOTES
Called patient's care coordinator Aymilex who I spoke to yesterday about home O2 intake at 9:44AM. She said she needed to track patient down because he was transferred to a different floor.  10:17AM- Yamilex called my direct line back and said patient's care coordinator for today is Paz. I called Paz and lvm for her to call my direct line back to check in about documentation needed to bill for O2   10:22AM- Paz called back, we discussed F2F and signed order being needed.  10:51AM- All documentation is in patient chart, confirmed patient discharge for today.   10:55AM- Called patient to offer choice, he is ok with [employer] [facility] setting him up. He chose portable oxygen concentrator. I told patient I would be to bedside within the hour to deliver POC.  11:45AM- POC delivered to patient at bedside.

## 2020-04-03 ENCOUNTER — TELEPHONE (OUTPATIENT)
Dept: FAMILY MEDICINE | Facility: CLINIC | Age: 85
End: 2020-04-03

## 2020-04-03 NOTE — TELEPHONE ENCOUNTER
Chief Complaint: Septic Shock (H), Pulmonary Embolism, Bilateral (H),  U 02-APR-2020 1 / 2    [phone] (home)

## 2020-04-03 NOTE — TELEPHONE ENCOUNTER
"ED / Discharge Outreach Protocol    Patient Contact    Attempt # 1    Was call answered?  No.  Unable to leave message. Phone just rings.     Fernanda MITCHELL, RN    Discharge Orders            Home care nursing referral       Home Care PT Referral for Hospital Discharge       Home Care OT Referral for Hospital Discharge       Discharge Instructions     Prior to discharge, discharging RN to please call [facility] at [phone] to schedule a hospital f/u appointment with Dr. Suazo for after June 1, 2020. If Dr. Suazo's RN is unable to schedule the above presently due to COVID-19 restrictions, please ask that Dr. Suazo's RN fill out an \"orange sheet\" so that the patient is scheduled for the above once COVID-19 restrictions are lifted.          Reason for your hospital stay     Admitted with respiratory issues thought due to pneumonia as well as a new pulmonary embolism.  You were treated with antibiotics and started on blood thinners.  You also had anemia and were seen by GI with recommendations for an acid blocker to continue for at least the next 2 months.          Follow-up and recommended labs and tests      Follow up with PCP in 1 week ++ GI will call to schedule outpatient follow up          Activity     Your activity upon discharge: activity as tolerated          When to contact your care team     If you have worsening shortness of breath, start developing bloody or black stool, throwing up blood.          MD face to face encounter     Documentation of Face to Face and Certification for Home Health Services     I certify that patient: Hermilo Velasquez is under my care and that I, or a nurse practitioner or physician's assistant working with me, had a face-to-face encounter that meets the physician face-to-face encounter requirements with this patient on: 4/2/2020.     This encounter with the patient was in whole, or in part, for the following medical condition, which is the primary reason for home health care: COPD, " PE.     I certify that, based on my findings, the following services are medically necessary home health services: Nursing, Occupational Therapy and Physical Therapy.     My clinical findings support the need for the above services because: Nurse is needed: To assess blood pressure and oxygenation after changes in medications or other medical regimen.., Occupational Therapy Services are needed to assess and treat cognitive ability and address ADL safety due to impairment in cognition and safety. and Physical Therapy Services are needed to assess and treat the following functional impairments: deconditioning.     Further, I certify that my clinical findings support that this patient is homebound (i.e. absences from home require considerable and taxing effort and are for medical reasons or Worship services or infrequently or of short duration when for other reasons) because: Requires assistance of another person or specialized equipment to access medical services because patient: Is unable to exit home safely on own due to: needing O2...     Based on the above findings. I certify that this patient is confined to the home and needs intermittent skilled nursing care, physical therapy and/or speech therapy.  The patient is under my care, and I have initiated the establishment of the plan of care.  This patient will be followed by a physician who will periodically review the plan of care.  Physician/Provider to provide follow up care: Karson Bishop     Attending hospital physician (the Medicare certified [facility] provider): Charlie Muñiz DO  Physician Signature: See electronic signature associated with these discharge orders.  Date: 4/2/2020      Full Code          Oxygen Adult/Peds     Oxygen Documentation:   I certify that this patient, Hermilo Velasquez has been under my care (or a nurse practitioner or physican's assistant working with me). This is the face-to-face encounter for oxygen medical necessity.        Hermilo Velasquez is now in a chronic stable state and continues to require supplemental oxygen. Patient has continued oxygen desaturation due to COPD J44.9.     Alternative treatment(s) tried or considered and deemed clinically infective for treatment of COPD J44.9 include nebulizers, inhalers and steroids.  If portability is ordered, is the patient mobile within the home? yes     **Patients who qualify for home O2 coverage under the CMS guidelines require ABG tests or O2 sat readings obtained closest to, but no earlier than 2 days prior to the discharge, as evidence of the need for home oxygen therapy. Testing must be performed while patient is in the chronic stable state. See notes for O2 sats.**          Diet     Follow this diet upon discharge: Orders Placed This Encounter      Regular Diet Adult        Discharge Medications           Current Discharge Medication List             START taking these medications     Details   enoxaparin ANTICOAGULANT (LOVENOX) 80 MG/0.8ML syringe Inject 0.8 mLs (80 mg) Subcutaneous every 12 hours  Qty: 60 Syringe, Refills: 3     Associated Diagnoses: Pulmonary embolism, bilateral (H)       pantoprazole (PROTONIX) 40 MG EC tablet Take 1 tablet (40 mg) by mouth 2 times daily (before meals)  Qty: 60 tablet, Refills: 1     Associated Diagnoses: Anemia due to blood loss, acute       predniSONE (DELTASONE) 5 MG tablet Take 2 tablets (10 mg) by mouth daily for 5 days, THEN 1 tablet (5 mg) daily for 5 days.  Qty: 25 tablet, Refills: 0     Associated Diagnoses: Chronic obstructive pulmonary disease, unspecified COPD type (H)

## 2020-04-03 NOTE — TELEPHONE ENCOUNTER
Called Maximilian with  HC to provide verbal orders for SN, PT, and OT as requested    Tu MADRID RN

## 2020-04-03 NOTE — TELEPHONE ENCOUNTER
Reason for Call:  Home Health Care    Maximilian with [facility] called regarding (reason for call):     Orders are needed for this patient.     PT: OT: eval, treat    Skilled Nursing: 2x2 weeks, 1x3 weeks, 4 prn     Pt Provider: Pt is on Lovenox and requesting an end date (was on warfarin)    Phone Number Homecare Nurse can be reached at: [phone]    Can we leave a detailed message on this number? YES    Best Time: any    Call taken on 4/3/2020 at 1:50 PM by Candace Hernandez

## 2020-04-06 ENCOUNTER — TELEPHONE (OUTPATIENT)
Dept: FAMILY MEDICINE | Facility: CLINIC | Age: 85
End: 2020-04-06

## 2020-04-06 NOTE — TELEPHONE ENCOUNTER
PCP,    Pt prescribed Protonix after hospital discharge for GI bleed (see hospital admission note). This is on his allergy list as itching. He is not having any side effect symptoms yet. Would you like him to continue or switch to something else? Or does the benefit outweigh the risk?    Please advise    Thank you,  Tu MADRID RN

## 2020-04-06 NOTE — TELEPHONE ENCOUNTER
ED / Discharge Outreach Protocol    Patient Contact    Attempt # 2    Was call answered?  No.  Unable to leave message.    Fernanda MITCHELL RN

## 2020-04-06 NOTE — TELEPHONE ENCOUNTER
Reason for Call:  Medication or medication refill:    Do you use a [facility]?  Name of the pharmacy and phone number for the current request:     [facility] - [place], [place] - [address]    Name of the medication requested: enoxaparin ANTICOAGULANT (LOVENOX) 80 MG/0.8ML syringe    Other request: Pt was started on this at the hospital    Can we leave a detailed message on this number? YES    Phone number patient can be reached at: Home number on file [phone] (home)    Best Time: any    Call taken on 4/6/2020 at 4:13 PM by Candace Hernandez

## 2020-04-06 NOTE — TELEPHONE ENCOUNTER
I would recommend he stays on PPI due to benefits outweigh risk with his recent GI bleed AND Hospitalization, especially he is not having symptoms now since he has been taking medication from 3/22,   It is listed patient is  allergic to omeprazole , lansoprazole , and pantoprazole  all causing itching, and also has h/o chronic pruritis [itching] ,    Would re-consider changing medication [pantoprazole] if he starts having any symptoms [related to medication including rash or itching.. ]and patient/caregiver is advised  to notify PCP immediately for any such symptoms if they occur .

## 2020-04-06 NOTE — TELEPHONE ENCOUNTER
Reason for Call:  Medication or medication refill:    Do you use a [facility]?  Name of the pharmacy and phone number for the current request:  NA    Name of the medication requested: pantoprazole (PROTONIX) 40 MG EC tablet     Other request: Home Care states that pantoprazole is on pt's allergy list but it was prescribed in the hospital. Pt is not exhibiting Sx yet. Home care would like to know if they should continue to administer.     Can we leave a detailed message on this number? YES    Phone number Maximilian - [facility] can be reached at: [phone]    Best Time: any    Call taken on 4/6/2020 at 1:50 PM by Candace Hernandez

## 2020-04-06 NOTE — TELEPHONE ENCOUNTER
[facility] and the pharmacy technician stated a profile is not showing up for this patient in their system. CAlled patient and he states he was [facility].  Patient states he [facility] not [facility] before, which is why [facility] tech did not have information on the patient.    Advised patient to call the [facility] tomorrow morning, phone number given as well as the  pharmacy the current prescription for lovenox is at.  Patient stated understanding and was agreeable with plan.    KEIRA Bloom, RN  [employer] [facility]

## 2020-04-07 NOTE — TELEPHONE ENCOUNTER
Called [facility] home care RN:     Left a detailed VM for RN with message from Provider below.     Contact back with any questions/concerns and immediately with concerns for reaction to medication     Chrissy ALICEA RN

## 2020-04-13 ENCOUNTER — VIRTUAL VISIT (OUTPATIENT)
Dept: FAMILY MEDICINE | Facility: CLINIC | Age: 85
End: 2020-04-13
Payer: COMMERCIAL

## 2020-04-13 VITALS
WEIGHT: 180 LBS | SYSTOLIC BLOOD PRESSURE: 119 MMHG | OXYGEN SATURATION: 94 % | HEART RATE: 76 BPM | BODY MASS INDEX: 25.83 KG/M2 | DIASTOLIC BLOOD PRESSURE: 68 MMHG

## 2020-04-13 DIAGNOSIS — L29.9 CHRONIC PRURITUS: ICD-10-CM

## 2020-04-13 DIAGNOSIS — I26.99 PULMONARY EMBOLISM, BILATERAL (H): ICD-10-CM

## 2020-04-13 DIAGNOSIS — J18.9 PNEUMONIA OF BOTH LUNGS DUE TO INFECTIOUS ORGANISM, UNSPECIFIED PART OF LUNG: ICD-10-CM

## 2020-04-13 DIAGNOSIS — K92.2 UPPER GI BLEEDING: Primary | ICD-10-CM

## 2020-04-13 DIAGNOSIS — J96.01 ACUTE RESPIRATORY FAILURE WITH HYPOXIA (H): ICD-10-CM

## 2020-04-13 PROCEDURE — 99214 OFFICE O/P EST MOD 30 MIN: CPT | Mod: 95 | Performed by: INTERNAL MEDICINE

## 2020-04-13 RX ORDER — ACETAMINOPHEN 325 MG/1
2 TABLET ORAL EVERY 4 HOURS PRN
Status: ON HOLD | COMMUNITY
End: 2020-09-14

## 2020-04-13 RX ORDER — TRIAMCINOLONE ACETONIDE 1 MG/G
CREAM TOPICAL 2 TIMES DAILY PRN
Qty: 453.6 G | Refills: 3 | Status: SHIPPED | OUTPATIENT
Start: 2020-04-13 | End: 2022-07-22

## 2020-04-13 NOTE — PROGRESS NOTES
"Hermilo Velasquez is a 87 year old male who is being evaluated via a billable telephone visit.      The patient has been notified of following:     \"This telephone visit will be conducted via a call between you and your physician/provider. We have found that certain health care needs can be provided without the need for a physical exam.  This service lets us provide the care you need with a short phone conversation.  If a prescription is necessary we can send it directly to your pharmacy.  If lab work is needed we can place an order for that and you can then stop by our lab to have the test done at a later time.    Telephone visits are billed at different rates depending on your insurance coverage. During this emergency period, for some insurers they may be billed the same as an in-person visit.  Please reach out to your insurance provider with any questions.    If during the course of the call the physician/provider feels a telephone visit is not appropriate, you will not be charged for this service.\"    Patient has given verbal consent for Telephone visit?  Yes    How would you like to obtain your AVS? 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, Acute right lower lobe PE, Hematemesis due to acute upper gastrointestinal hemorrhage likely due to AVMs and chronic anticoagulation with coumadin  Acute blood loss anemia, History of gastroesophageal reflux disease, gastric and duodenal " angiectasias, Hypovolemic shock due to GI hemorrhage,Acute kidney injury,NSTEMI, likely type 2 - demand ischemia, Hypertension on metoprolol succinate prior to admission,            Problems taking medications regularly:  None       Medication changes since discharge: None       Problems adhering to non-medication therapy:  None    Summary of hospitalization:  [facility] discharge summary reviewed  Diagnostic Tests/Treatments reviewed.  Follow up needed: vascular medicine  Other Healthcare Providers Involved in Patient s Care:         Homecare  Update since discharge: improved.     Post Discharge Medication Reconciliation: discharge medications reconciled and changed, per note/orders (see AVS).  Plan of care communicated with patient     Coding guidelines for this visit:  Type of Medical   Decision Making Face-to-Face Visit       within 7 Days of discharge Face-to-Face Visit        within 14 days of discharge   Moderate Complexity [id number]   High Complexity 37386 [id number]            Hospitalized for hematemesis and melena  Found to have bilateral pneumonia and pulmonary embolism  Coumadin stopped   Lovenox BID started    He feels better  No dyspnea   No cough  No fevers  Stools are brown  No nausea or vomiting    He does not want to give himself twice daily lovenox injections for the rest of his life   He remains on supplemental oxygen  Home RN is visiting      Patient Active Problem List   Diagnosis     Ventral hernia     Nonrheumatic aortic valve stenosis     GERD (gastroesophageal reflux disease)     Non Hodgkin's lymphoma (H): 2013     Microscopic hematuria     Health Care Home     CARDIOVASCULAR SCREENING; LDL GOAL LESS THAN 130     History of colonic polyps     Neuropathy     Anemia due to blood loss, acute     Physical deconditioning     Paroxysmal atrial fibrillation (H)     Need for SBE (subacute bacterial endocarditis) prophylaxis     RBBB (right bundle branch block)     Gastrointestinal hemorrhage  with melena     Primary osteoarthritis of both knees     Primary osteoarthritis of left hip     Pulmonary embolism, bilateral (H)     S/P IVC filter     Long-term (current) use of anticoagulants [Z79.01]     Benign neoplasm of colon, unspecified part of colon     Pulmonary nodules     Benign neoplasm of colon     Primary osteoarthritis of left knee     Essential hypertension with goal blood pressure less than 140/90     Non-Hodgkin's lymphoma, unspecified body region, unspecified non-Hodgkin lymphoma type (H)     Anticoagulated on Coumadin     Other chronic pulmonary embolism without acute cor pulmonale (H)     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     Aftercare following left knee joint replacement surgery     Drainage from wound: left Knee     Lower extremity edema     Leg edema, left     S/P total knee arthroplasty     ACP (advance care planning)     Hyperlipidemia LDL goal <130     Chronic obstructive pulmonary disease, unspecified COPD type (H)     Iron deficiency anemia due to chronic blood loss     Spongiotic dermatitis     Nodule of lower lobe of right lung     Rotator cuff tendinitis, left     S/P rotator cuff surgery     CAP (community acquired pneumonia)     Malignant neoplasm of lung, unspecified laterality, unspecified part of lung (H)     Acute respiratory failure with hypoxia (H)     Hematemesis     Past Surgical History:   Procedure Laterality Date     APPENDECTOMY       AS TOTAL KNEE ARTHROPLASTY       BACK SURGERY       ESOPHAGOSCOPY, GASTROSCOPY, DUODENOSCOPY (EGD), COMBINED N/A 11/28/2015    Procedure: COMBINED ESOPHAGOSCOPY, GASTROSCOPY, DUODENOSCOPY (EGD);  Surgeon: Danis Castillo MD;  Location:  GI     ESOPHAGOSCOPY, GASTROSCOPY, DUODENOSCOPY (EGD), COMBINED N/A 7/26/2018    Procedure: COMBINED ESOPHAGOSCOPY, GASTROSCOPY, DUODENOSCOPY (EGD);;  Surgeon: Toby Dong DO;  Location:  GI     HERNIA REPAIR  2006     HERNIORRHAPHY VENTRAL  4/17/2013    Procedure:  HERNIORRHAPHY VENTRAL;  VENTRAL HERNIA REPAIR WITH MESH;  Surgeon: Patel Guzman MD;  Location: [facility] OR     KNEE SURGERY      arthroscopic right knee surgery      LOBECTOMY LUNG Right 3/26/2019    Procedure: POSSIBLE LOBECTOMY LUNG;  Surgeon: Jose A Vasques MD;  Location: [facility] OR     REPAIR LIGAMENT ANKLE  2012    Procedure:REPAIR LIGAMENT ANKLE; LEFT TARSAL TUNNEL RELEASE OF KNOT OF ARIEL RELEASE; Surgeon:SAUL PUENTE; Location:[facility] OR     REPLACE VALVE AORTIC N/A 9/3/2015    Procedure: REPLACE VALVE AORTIC;  Surgeon: Antonino Mitchell MD;  Location: [facility] OR     ROTATOR CUFF REPAIR RT/LT      bilateral     SPINE SURGERY      3 spine surgeries     THORACOTOMY, WEDGE RESECTION LUNG, COMBINED Right 3/26/2019    Procedure: RIGHT THORACOTOMY, WEDGE RESECTION, RIGHT LOWER LOBE LUNG NODULE,;  Surgeon: Jose A Vasques MD;  Location: [facility] OR     TONSILLECTOMY       TURP         Social History     Tobacco Use     Smoking status: Former Smoker     Packs/day: 2.00     Years: 55.00     Pack years: 110.00     Last attempt to quit: 1998     Years since quittin.2     Smokeless tobacco: Never Used   Substance Use Topics     Alcohol use: Yes     Alcohol/week: 0.0 standard drinks     Comment: 1 drink per day     Family History   Problem Relation Age of Onset     C.A.D. Father      Emphysema Father      Melanoma No family hx of      Skin Cancer No family hx of          Current Outpatient Medications   Medication Sig Dispense Refill     albuterol (PROAIR HFA/PROVENTIL HFA/VENTOLIN HFA) 108 (90 Base) MCG/ACT Inhaler Inhale 1-2 puffs into the lungs every 6 hours as needed for shortness of breath / dyspnea or wheezing 3 Inhaler 5     atorvastatin (LIPITOR) 10 MG tablet Take 1 tablet (10 mg) by mouth daily 90 tablet 3     enoxaparin ANTICOAGULANT (LOVENOX) 120 MG/0.8ML syringe Inject 0.8 mLs (120 mg) Subcutaneous daily 90 Syringe 3     enoxaparin ANTICOAGULANT (LOVENOX) 80 MG/0.8ML  "syringe Inject 0.8 mLs (80 mg) Subcutaneous every 12 hours 60 Syringe 3     famotidine (PEPCID) 40 MG tablet Take 1 tablet (40 mg) by mouth 2 times daily as needed (heartburn) 180 tablet 1     gabapentin (NEURONTIN) 300 MG capsule Take 300 mg by mouth every morning       gabapentin (NEURONTIN) 300 MG capsule Take 900 mg by mouth At Bedtime       HYDROcodone-acetaminophen (NORCO) 5-325 MG tablet Take 1 tablet by mouth every 6 hours as needed for pain 18 tablet 0     hydrOXYzine (ATARAX) 25 MG tablet Take 2 tablets (50 mg) by mouth every 6 hours as needed 360 tablet 3     loratadine (CLARITIN) 10 MG tablet Take 10 mg by mouth daily as needed for allergies        metoprolol succinate ER (TOPROL-XL) 25 MG 24 hr tablet Take 0.5 tablets (12.5 mg) by mouth daily 30 tablet 0     order for DME Equipment being ordered: Right neoprene knee brace.  ( Fax to [facility] fax  [phone]) 1 each 0     pantoprazole (PROTONIX) 40 MG EC tablet Take 1 tablet (40 mg) by mouth 2 times daily (before meals) 60 tablet 1     tiotropium (SPIRIVA HANDIHALER) 18 MCG capsule Inhale 1 capsule (18 mcg) into the lungs daily 90 capsule 3     triamcinolone (KENALOG) 0.1 % external cream Apply topically 2 times daily as needed for irritation 453.6 g 3     acetaminophen (TYLENOL) 500 MG tablet Take 2 tablets by mouth every 4 hours       Allergies   Allergen Reactions     Lidocaine Blisters     Allergy to lidocaine ointment     Omeprazole Itching     Pantoprazole Itching     Prevacid [Lansoprazole] Itching     Lasix [Furosemide] Rash     Penicillins Rash     \"broke out from injection\" 60 yrs ago         Reviewed and updated as needed this visit by Provider         Review of Systems   ROS COMP: Constitutional, HEENT, cardiovascular, pulmonary, gi and gu systems are negative, except as otherwise noted.       Objective           Assessment/Plan:  1. Upper GI bleeding  GI elected to not do endoscopies, but bleeding seems to have stabilized  Now back " on anticoagulants with brown stools and no signs of ongoing bleeding    2. Pulmonary embolism, bilateral (H)  Needs to be anticoagulated for this   Presumed coumadin failure  Not apparently a candidate for NOACs due to valvular heart disease?  However, a NOAC may be a better long term solution rather than lifetime BID lovenox injections?  Patient does not think he can do twice daily injections for the rest of his life  As a temporizing measure we could change to once daily 1.5mg/kg dosing to at least require only one shot per day rather than two; patient wants to do this as soon as possible   Regarding long -term management, I think he should reconvene with vascular medicine specialist to discuss long-term anticoagulation strategy now that he seems to be stable from his GI bleed; this could be done by telephone; will send vascular medicine consult      Sent prescription for 1.5mg/kg lovenox syringes to be dosed once daily; he will stay on 1mg/kg BID dosing until he gets the new syringes     3. Acute respiratory failure with hypoxia (H)  Still on oxygen; hopefully this can weaned with help of home RN     4. Pneumonia of both lungs due to infectious organism, unspecified part of lung  Completed antibiotics and without reports of fevers or cough     5. Chronic pruritus  He needed refill on his topical steroid   - triamcinolone (KENALOG) 0.1 % external cream; Apply topically 2 times daily as needed for irritation  Dispense: 453.6 g; Refill: 3    Return in about 3 weeks (around 5/4/2020) for Recheck GI bleed, recheck need for oxygen, recheck anticoagulants .      Phone call duration:  28 minutes    Karson Bishop MD

## 2020-04-14 ENCOUNTER — TELEPHONE (OUTPATIENT)
Dept: OTHER | Facility: CLINIC | Age: 85
End: 2020-04-14

## 2020-04-14 NOTE — TELEPHONE ENCOUNTER
"Pt referred to [facility] by Dr. Bishop for bilateral PE, discuss long-term anticoagulation.    Per Dr. Bishop's 4/13/20 note:  \"2. Pulmonary embolism, bilateral (H)  Needs to be anticoagulated for this   Presumed coumadin failure  Not apparently a candidate for NOACs due to valvular heart disease?  However, a NOAC may be a better long term solution rather than lifetime BID lovenox injections?  Patient does not think he can do twice daily injections for the rest of his life  As a temporizing measure we could change to once daily 1.5mg/kg dosing to at least require only one shot per day rather than two\"...    Discussed with Dr. Ospina.    Will route to scheduling to contact pt and coordinate evisit/telephone consult with Dr. Ospina at next available.    Amirah Lew, ARCADION, RN-[facility]      "

## 2020-04-14 NOTE — TELEPHONE ENCOUNTER
April 14, 2020    Patient is scheduled for New Patient Telephone Consult appointment on 4/20/2020 with Dr. Ospina.     [facility] EdmarThe [facility] Hospital[facility]    [facility]  Office: [phone]  Fax [phone]

## 2020-04-16 ENCOUNTER — TELEPHONE (OUTPATIENT)
Dept: FAMILY MEDICINE | Facility: CLINIC | Age: 85
End: 2020-04-16

## 2020-04-16 DIAGNOSIS — I26.99 PULMONARY EMBOLISM, BILATERAL (H): ICD-10-CM

## 2020-04-16 NOTE — TELEPHONE ENCOUNTER
Spoke with patient and questions answered.  He states that the RX needs to be faxed to [phone].  Would PCP be able to print script and sign and manually fax to listed fax number?    Routed to PCP.

## 2020-04-16 NOTE — TELEPHONE ENCOUNTER
I faxed prescription for enoxaparin (lovenox) 120 mg/0.8 ml syringe to [phone] per Qiana Fernandes, original placed in [facility]. Leanne Jacobo, CMA

## 2020-04-16 NOTE — TELEPHONE ENCOUNTER
Reason for Call:  Medication or medication refill:    Do you use a [facility]?  Name of the pharmacy and phone number for the current request:  NA    Name of the medication requested: enoxaparin ANTICOAGULANT (LOVENOX) 120     Other request: will like a call back on correct amount to take    Can we leave a detailed message on this number? YES    Phone number patient can be reached at: Home number on file [phone] (home)    Best Time: Any    Call taken on 4/16/2020 at 1:34 PM by Rose Davis

## 2020-04-20 ENCOUNTER — VIRTUAL VISIT (OUTPATIENT)
Dept: OTHER | Facility: CLINIC | Age: 85
End: 2020-04-20
Attending: INTERNAL MEDICINE
Payer: COMMERCIAL

## 2020-04-20 VITALS
DIASTOLIC BLOOD PRESSURE: 60 MMHG | WEIGHT: 180 LBS | BODY MASS INDEX: 25.77 KG/M2 | HEIGHT: 70 IN | SYSTOLIC BLOOD PRESSURE: 106 MMHG

## 2020-04-20 DIAGNOSIS — I26.99 ACUTE PULMONARY EMBOLISM WITHOUT ACUTE COR PULMONALE, UNSPECIFIED PULMONARY EMBOLISM TYPE (H): Primary | ICD-10-CM

## 2020-04-20 PROCEDURE — 99213 OFFICE O/P EST LOW 20 MIN: CPT | Mod: 95 | Performed by: INTERNAL MEDICINE

## 2020-04-20 ASSESSMENT — MIFFLIN-ST. JEOR: SCORE: 1497.72

## 2020-04-20 NOTE — PROGRESS NOTES
Vascular Medicine Progress Note     Hermilo Velasquez is a 87 year old male who was interviewed via billable telephone consult for follow-up status post discharge from the hospital for PE, patient was anticoagulated by Lovenox patient has history of lung cancer and non-Hodgkin lymphoma    Interval History   When asked about the history of lung cancer and non-Hodgkin lymphoma patient mentioned that the last treatment he received was about a year ago and that the cancer was taken out completely    Patient was discharged on Lovenox dose was weight-based and he takes the treatment twice a day he has what is enough for 26 days    Patient reports no complication from treatment no history of bleeding, no signs or symptoms suggestive of upper or lower GI bleed    Thrombosis/VTE risks outweighs bleeding risk so decision was to continue with anticoagulation    Physical Exam       BP: 106/60              Vitals:    04/20/20 1148   Weight: 81.6 kg (180 lb)     Vital Signs with Ranges  BP: (106)/(60) 106/60  [unfilled]        Medications         Data   No results found for this or any previous visit (from the past 24 hour(s)).    Assessment & Plan   No diagnosis found.      Summary: Continue with current anticoagulation which is Lovenox weight-based    Check CBC with differential count    Continue with same current medications    Follow-up with the patient in 3 weeks from now with CBC with differential count    Jamie Ospina MD

## 2020-04-20 NOTE — PROGRESS NOTES
"Hermilo Velasquez is a 87 year old male who is being evaluated via a billable telephone visit.      The patient has been notified of following:     \"This telephone visit will be conducted via a call between you and your physician/provider. We have found that certain health care needs can be provided without the need for a physical exam.  This service lets us provide the care you need with a short phone conversation.  If a prescription is necessary we can send it directly to your pharmacy.  If lab work is needed we can place an order for that and you can then stop by our lab to have the test done at a later time.    Telephone visits are billed at different rates depending on your insurance coverage. During this emergency period, for some insurers they may be billed the same as an in-person visit.  Please reach out to your insurance provider with any questions.    If during the course of the call the physician/provider feels a telephone visit is not appropriate, you will not be charged for this service.\"    Patient has given verbal consent for Telephone visit?  Yes   "

## 2020-05-01 ENCOUNTER — TELEPHONE (OUTPATIENT)
Dept: FAMILY MEDICINE | Facility: CLINIC | Age: 85
End: 2020-05-01

## 2020-05-01 NOTE — TELEPHONE ENCOUNTER
Reason for call:  Patient reporting a symptom    Symptom or request: hypotension    Duration (how long have symptoms been present): 05.01.2020    Have you been treated for this before? No    Additional comments: Pt reports a BP readings of   108/44; pulse 81  94/48; pulse 76    Phone Number patient can be reached at:  Home number on file [phone] (home)    Best Time:  any    Can we leave a detailed message on this number:  YES    Call taken on 5/1/2020 at 11:44 AM by Candace Hernandez

## 2020-05-01 NOTE — TELEPHONE ENCOUNTER
I would start by stopping tamsulosin, schedule virtual visit if that does not address symptoms adequately

## 2020-05-01 NOTE — TELEPHONE ENCOUNTER
Called pt    He feels lightheaded when getting out of bed  Denies dizziness or lightheadedness at the moment  [facility]- Started last night  Last couple mornings he's felt dizzy when getting up  BP typically 100s/60s before starting tamsulosin    He's also states he's more tired lately  He has been taking gabapentin for a while he states and thinks the drowsiness could be from that  He takes 900 mg at night and 300 mg during the day    Please advise if dose changes.  Pended pharm    Thank you,  Tu MADRID, RN

## 2020-05-01 NOTE — TELEPHONE ENCOUNTER
Called pt    He has appt scheduled Monday- he will stop taking tamsulosin over the weekend and update PCP on Monday    Tu MADRID RN

## 2020-05-04 ENCOUNTER — VIRTUAL VISIT (OUTPATIENT)
Dept: FAMILY MEDICINE | Facility: CLINIC | Age: 85
End: 2020-05-04
Payer: COMMERCIAL

## 2020-05-04 DIAGNOSIS — C85.90 NON-HODGKIN'S LYMPHOMA, UNSPECIFIED BODY REGION, UNSPECIFIED NON-HODGKIN LYMPHOMA TYPE (H): ICD-10-CM

## 2020-05-04 DIAGNOSIS — R35.1 NOCTURIA: ICD-10-CM

## 2020-05-04 DIAGNOSIS — G62.9 NEUROPATHY: ICD-10-CM

## 2020-05-04 DIAGNOSIS — C34.90 MALIGNANT NEOPLASM OF LUNG, UNSPECIFIED LATERALITY, UNSPECIFIED PART OF LUNG (H): ICD-10-CM

## 2020-05-04 DIAGNOSIS — I27.82 OTHER CHRONIC PULMONARY EMBOLISM WITHOUT ACUTE COR PULMONALE (H): Primary | ICD-10-CM

## 2020-05-04 PROCEDURE — 99214 OFFICE O/P EST MOD 30 MIN: CPT | Mod: 95 | Performed by: INTERNAL MEDICINE

## 2020-05-04 RX ORDER — TAMSULOSIN HYDROCHLORIDE 0.4 MG/1
0.4 CAPSULE ORAL DAILY
Status: ON HOLD | COMMUNITY
End: 2020-08-15

## 2020-05-04 NOTE — PROGRESS NOTES
"Hermilo Velasquez is a 87 year old male who is being evaluated via a billable telephone visit.      The patient has been notified of following:     \"This telephone visit will be conducted via a call between you and your physician/provider. We have found that certain health care needs can be provided without the need for a physical exam.  This service lets us provide the care you need with a short phone conversation.  If a prescription is necessary we can send it directly to your pharmacy.  If lab work is needed we can place an order for that and you can then stop by our lab to have the test done at a later time.    Telephone visits are billed at different rates depending on your insurance coverage. During this emergency period, for some insurers they may be billed the same as an in-person visit.  Please reach out to your insurance provider with any questions.    If during the course of the call the physician/provider feels a telephone visit is not appropriate, you will not be charged for this service.\"    Patient has given verbal consent for Telephone visit?  Yes    What phone number would you like to be contacted at? [phone]    How would you like to obtain your AVS? Mail a copy    Subjective     Hermilo Velasquez is a 87 year old male who presents to clinic today for the following health issues:    HPI    Follow up GIB, neuropathy, BPH with nocturia, Lung cancer, Lymphoma, history of PE, valvular heart disease, former smoker  Gabapentin makes him feel too sleepy during the day  [facility] started flomax, but it makes him feel dizziness  Prior to starting he had 1X per night nocturia   He is using 1 liter of supplemental O2 and sats drop to 88% with walking up staris  He otherwise feels well  No dyspnea complaints  Stools are brown, no hematemesis or hemoptysis          Patient Active Problem List   Diagnosis     Ventral hernia     Nonrheumatic aortic valve stenosis     GERD (gastroesophageal reflux disease)     Non " Hodgkin's lymphoma (H): 2013     Microscopic hematuria     Health Care Home     CARDIOVASCULAR SCREENING; LDL GOAL LESS THAN 130     History of colonic polyps     Neuropathy     Anemia due to blood loss, acute     Physical deconditioning     Paroxysmal atrial fibrillation (H)     Need for SBE (subacute bacterial endocarditis) prophylaxis     RBBB (right bundle branch block)     Gastrointestinal hemorrhage with melena     Primary osteoarthritis of both knees     Primary osteoarthritis of left hip     Pulmonary embolism, bilateral (H)     S/P IVC filter     Long-term (current) use of anticoagulants [Z79.01]     Benign neoplasm of colon, unspecified part of colon     Pulmonary nodules     Benign neoplasm of colon     Primary osteoarthritis of left knee     Essential hypertension with goal blood pressure less than 140/90     Non-Hodgkin's lymphoma, unspecified body region, unspecified non-Hodgkin lymphoma type (H)     Anticoagulated on Coumadin     Other chronic pulmonary embolism without acute cor pulmonale (H)     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     Aftercare following left knee joint replacement surgery     Drainage from wound: left Knee     Lower extremity edema     Leg edema, left     S/P total knee arthroplasty     ACP (advance care planning)     Hyperlipidemia LDL goal <130     Chronic obstructive pulmonary disease, unspecified COPD type (H)     Iron deficiency anemia due to chronic blood loss     Spongiotic dermatitis     Nodule of lower lobe of right lung     Rotator cuff tendinitis, left     S/P rotator cuff surgery     CAP (community acquired pneumonia)     Malignant neoplasm of lung, unspecified laterality, unspecified part of lung (H)     Acute respiratory failure with hypoxia (H)     Hematemesis     Past Surgical History:   Procedure Laterality Date     APPENDECTOMY       AS TOTAL KNEE ARTHROPLASTY       BACK SURGERY       ESOPHAGOSCOPY, GASTROSCOPY, DUODENOSCOPY (EGD), COMBINED N/A  2015    Procedure: COMBINED ESOPHAGOSCOPY, GASTROSCOPY, DUODENOSCOPY (EGD);  Surgeon: Danis Castillo MD;  Location:  GI     ESOPHAGOSCOPY, GASTROSCOPY, DUODENOSCOPY (EGD), COMBINED N/A 2018    Procedure: COMBINED ESOPHAGOSCOPY, GASTROSCOPY, DUODENOSCOPY (EGD);;  Surgeon: Toby Dong DO;  Location:  GI     HERNIA REPAIR  2006     HERNIORRHAPHY VENTRAL  2013    Procedure: HERNIORRHAPHY VENTRAL;  VENTRAL HERNIA REPAIR WITH MESH;  Surgeon: Patel Guzman MD;  Location:  OR     KNEE SURGERY      arthroscopic right knee surgery      LOBECTOMY LUNG Right 3/26/2019    Procedure: POSSIBLE LOBECTOMY LUNG;  Surgeon: Jose A Vasques MD;  Location:  OR     REPAIR LIGAMENT ANKLE  2012    Procedure:REPAIR LIGAMENT ANKLE; LEFT TARSAL TUNNEL RELEASE OF KNOT OF ARIEL RELEASE; Surgeon:SAUL PUENTE; Location: OR     REPLACE VALVE AORTIC N/A 9/3/2015    Procedure: REPLACE VALVE AORTIC;  Surgeon: Antonino Mitchell MD;  Location:  OR     ROTATOR CUFF REPAIR RT/LT      bilateral     SPINE SURGERY      3 spine surgeries     THORACOTOMY, WEDGE RESECTION LUNG, COMBINED Right 3/26/2019    Procedure: RIGHT THORACOTOMY, WEDGE RESECTION, RIGHT LOWER LOBE LUNG NODULE,;  Surgeon: Jose A Vasques MD;  Location:  OR     TONSILLECTOMY       TURP         Social History     Tobacco Use     Smoking status: Former Smoker     Packs/day: 2.00     Years: 55.00     Pack years: 110.00     Last attempt to quit: 1998     Years since quittin.2     Smokeless tobacco: Never Used   Substance Use Topics     Alcohol use: Yes     Alcohol/week: 0.0 standard drinks     Comment: 1 drink per day     Family History   Problem Relation Age of Onset     C.A.D. Father      Emphysema Father      Melanoma No family hx of      Skin Cancer No family hx of          Current Outpatient Medications   Medication Sig Dispense Refill     acetaminophen (TYLENOL) 500 MG tablet Take 2  tablets by mouth every 4 hours       albuterol (PROAIR HFA/PROVENTIL HFA/VENTOLIN HFA) 108 (90 Base) MCG/ACT Inhaler Inhale 1-2 puffs into the lungs every 6 hours as needed for shortness of breath / dyspnea or wheezing 3 Inhaler 5     atorvastatin (LIPITOR) 10 MG tablet Take 1 tablet (10 mg) by mouth daily 90 tablet 3     enoxaparin ANTICOAGULANT (LOVENOX) 120 MG/0.8ML syringe Inject 0.8 mLs (120 mg) Subcutaneous daily 90 Syringe 3     enoxaparin ANTICOAGULANT (LOVENOX) 80 MG/0.8ML syringe Inject 0.8 mLs (80 mg) Subcutaneous every 12 hours 60 Syringe 3     famotidine (PEPCID) 40 MG tablet Take 1 tablet (40 mg) by mouth 2 times daily as needed (heartburn) 180 tablet 1     gabapentin (NEURONTIN) 300 MG capsule Take 300 mg by mouth every morning       gabapentin (NEURONTIN) 300 MG capsule Take 900 mg by mouth At Bedtime       HYDROcodone-acetaminophen (NORCO) 5-325 MG tablet Take 1 tablet by mouth every 6 hours as needed for pain 18 tablet 0     hydrOXYzine (ATARAX) 25 MG tablet Take 2 tablets (50 mg) by mouth every 6 hours as needed 360 tablet 3     loratadine (CLARITIN) 10 MG tablet Take 10 mg by mouth daily as needed for allergies        metoprolol succinate ER (TOPROL-XL) 25 MG 24 hr tablet Take 0.5 tablets (12.5 mg) by mouth daily 30 tablet 0     order for DME Equipment being ordered: Right neoprene knee brace.  ( Fax to [facility] fax  [phone]) 1 each 0     pantoprazole (PROTONIX) 40 MG EC tablet Take 1 tablet (40 mg) by mouth 2 times daily (before meals) 60 tablet 1     tamsulosin (FLOMAX) 0.4 MG capsule Take 0.4 mg by mouth daily       tiotropium (SPIRIVA HANDIHALER) 18 MCG capsule Inhale 1 capsule (18 mcg) into the lungs daily 90 capsule 3     triamcinolone (KENALOG) 0.1 % external cream Apply topically 2 times daily as needed for irritation 453.6 g 3     Allergies   Allergen Reactions     Lidocaine Blisters     Allergy to lidocaine ointment     Omeprazole Itching     Pantoprazole Itching     Prevacid  "[Lansoprazole] Itching     Lasix [Furosemide] Rash     Penicillins Rash     \"broke out from injection\" 60 yrs ago         Reviewed and updated as needed this visit by Provider         Review of Systems   ROS COMP: Constitutional, HEENT, cardiovascular, pulmonary, gi and gu systems are negative, except as otherwise noted.       Objective   Reported vitals:  There were no vitals taken for this visit.     PSYCH: Alert and oriented times 3; coherent speech, normal   rate and volume, able to articulate logical thoughts, able   to abstract reason, no tangential thoughts, no hallucinations   or delusions  His affect is normal  RESP: No cough, no audible wheezing, able to talk in full sentences  Remainder of exam unable to be completed due to telephone visits            Assessment/Plan:  1. Other chronic pulmonary embolism without acute cor pulmonale (H)  Vascular medicine did not offer an oral alternative to long term use of lovenox  He is now taking it once daily at 1.5mg/kg dose    2. Malignant neoplasm of lung, unspecified laterality, unspecified part of lung (H)      3. Non-Hodgkin's lymphoma, unspecified body region, unspecified non-Hodgkin lymphoma type (H)      4. Neuropathy  Stop gabapentin during the day due to sleepiness side effect, can use at night as needed     5. Nocturia  Stop flomax and monitor nocturia symptoms; restart if needed; short term follow up to assess     Goal O2 sat is > 88 %; if he maintains this oxygen saturation with activity monitoring with his home meter, then he can stop the last 1 liter of supplemental O2; he will check occassionally and we will touch base about his home reading in 2 weeks     Return in about 2 weeks (around 5/18/2020) for recheck.  Patient instructed to return to clinic or contact us sooner if symptoms worsen or new symptoms develop.       Phone call duration:  18:30 minutes    Karson Bishop MD        "

## 2020-05-08 ENCOUNTER — PATIENT OUTREACH (OUTPATIENT)
Dept: CARE COORDINATION | Facility: CLINIC | Age: 85
End: 2020-05-08

## 2020-05-08 DIAGNOSIS — K92.1 GASTROINTESTINAL HEMORRHAGE WITH MELENA: ICD-10-CM

## 2020-05-08 DIAGNOSIS — I26.99 PULMONARY EMBOLISM, BILATERAL (H): Primary | ICD-10-CM

## 2020-05-08 NOTE — LETTER
[facility] Home  Complex Care Plan  About Me:    Patient Name:  Hermilo Velasquez    YOB: 1932  Age:         87 year old   [place] MRN:    [id number] Telephone Information:  Home Phone [phone]   Mobile none       Address:  [address]  [facility] [address] Email address:  [email or web address]      Emergency Contact(s)    Name Relationship Lgl Grd Work Phone Home Phone Mobile Phone   1. RAND MARINELLI Spouse No   [phone]   2. NERIS, MARC Son No   [phone]           Primary language:  English     needed? No   [place] [employer]:  [phone] op. 1  Other communication barriers: None  Preferred Method of Communication:  Mail  Current living arrangement: I live in a private home with spouse  Mobility Status/ Medical Equipment: Independent w/Device    Health Maintenance  Health Maintenance Reviewed: Due/Overdue   Health Maintenance Due   Topic Date Due     URINE DRUG SCREEN  11/03/1932     DTAP/TDAP/TD IMMUNIZATION (1 - Tdap) 11/03/1957     MEDICARE ANNUAL WELLNESS VISIT  11/03/1997     OP ANNUAL INR REFERRAL  10/24/2019       My Access Plan  Medical Emergency 911   Primary Clinic Line [facility] - [phone]   24 Hour Appointment Line [phone] or  [phone]) (toll-free)   24 Hour Nurse Line [phone] (toll-free)   Preferred Urgent Care [facility], [phone]   Preferred Hospital [facility]  [phone]   Preferred Pharmacy [facility] [place], [place] - [address] Behavioral Health Crisis Line The National Suicide Prevention Lifeline at 1-150.634.1282 or 911             My Care Team Members  Patient Care Team       Relationship Specialty Notifications Start End    Karson Bishop MD PCP - General Internal Medicine  2/6/18     Phone: [phone] Pager: [phone] Fax: [phone] [address] [facility] [address]     Margarita Lisa MD MD Orthopedics  8/30/16     Phone: [phone] Fax: [phone]         MARGARITA LISA MD [address] [place] [address]    Karson Bishop MD Assigned PCP   2/18/18     Phone: [phone] Pager: [phone] Fax: [phone] [address] [place] [address]    Augusta Swanson, RN Lead Care Coordinator Primary Care - CC Admissions 5/8/20     Phone: [phone]                 My Care Plans  Self Management and Treatment Plan  Goals and (Comments)  Goals        General    Monitoring (pt-stated)     Notes - Note created  5/12/2020  2:04 PM by Augusta Swanson, RN    Goal Statement: I will seek medical help if I have blood in my stool  Date Goal set: 5/12/2020  Barriers: None identified   Strengths: Agrees with the plan  Date to Achieve By: 6/12/2020  Patient expressed understanding of goal: Yes  Action steps to achieve this goal:  1. I will make a future appointment with the Gastroenterologist   2. Care coordinator will follow up in 1-2 weeks   As of today's date 5/12/2020 goal is met at 0 - 25%.   Goal Status:  Active              Action Plans on File:         COPD             Advance Care Plans/Directives Type:   Type Advanced Care Plans/Directives: (Resucandi laughlin)    My Medical and Care Information  Problem List   Patient Active Problem List   Diagnosis     Ventral hernia     Nonrheumatic aortic valve stenosis     GERD (gastroesophageal reflux disease)     Non Hodgkin's lymphoma (H): 2013     Microscopic hematuria     Health Care Home     CARDIOVASCULAR SCREENING; LDL GOAL LESS THAN 130     History of colonic polyps     Neuropathy     Anemia due to blood loss, acute     Physical deconditioning     Paroxysmal atrial fibrillation (H)     Need for SBE (subacute bacterial endocarditis) prophylaxis     RBBB (right bundle branch block)     Gastrointestinal hemorrhage with melena     Primary osteoarthritis of both knees     Primary osteoarthritis of left hip      Pulmonary embolism, bilateral (H)     S/P IVC filter     Long-term (current) use of anticoagulants [Z79.01]     Benign neoplasm of colon, unspecified part of colon     Pulmonary nodules     Benign neoplasm of colon     Primary osteoarthritis of left knee     Essential hypertension with goal blood pressure less than 140/90     Non-Hodgkin's lymphoma, unspecified body region, unspecified non-Hodgkin lymphoma type (H)     Anticoagulated on Coumadin     Other chronic pulmonary embolism without acute cor pulmonale (H)     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     Aftercare following left knee joint replacement surgery     Drainage from wound: left Knee     Lower extremity edema     Leg edema, left     S/P total knee arthroplasty     ACP (advance care planning)     Hyperlipidemia LDL goal <130     Chronic obstructive pulmonary disease, unspecified COPD type (H)     Iron deficiency anemia due to chronic blood loss     Spongiotic dermatitis     Nodule of lower lobe of right lung     Rotator cuff tendinitis, left     S/P rotator cuff surgery     CAP (community acquired pneumonia)     Malignant neoplasm of lung, unspecified laterality, unspecified part of lung (H)     Acute respiratory failure with hypoxia (H)     Hematemesis      Current Medications and Allergies:    Current Outpatient Medications   Medication     acetaminophen (TYLENOL) 500 MG tablet     albuterol (PROAIR HFA/PROVENTIL HFA/VENTOLIN HFA) 108 (90 Base) MCG/ACT Inhaler     atorvastatin (LIPITOR) 10 MG tablet     enoxaparin ANTICOAGULANT (LOVENOX) 120 MG/0.8ML syringe     enoxaparin ANTICOAGULANT (LOVENOX) 80 MG/0.8ML syringe     famotidine (PEPCID) 40 MG tablet     gabapentin (NEURONTIN) 300 MG capsule     gabapentin (NEURONTIN) 300 MG capsule     HYDROcodone-acetaminophen (NORCO) 5-325 MG tablet     hydrOXYzine (ATARAX) 25 MG tablet     loratadine (CLARITIN) 10 MG tablet     metoprolol succinate ER (TOPROL-XL) 25 MG 24 hr tablet     order for  DME     pantoprazole (PROTONIX) 40 MG EC tablet     tamsulosin (FLOMAX) 0.4 MG capsule     tiotropium (SPIRIVA HANDIHALER) 18 MCG capsule     triamcinolone (KENALOG) 0.1 % external cream     No current facility-administered medications for this visit.    .    Care Coordination Start Date: 5/12/2020   Frequency of Care Coordination: 2 weeks   Form Last Updated: 05/12/2020

## 2020-05-08 NOTE — LETTER
[facility] CARE COORDINATION  [address] [facility] [address]  [facility] [address]    May 12, 2020    Hermilo Velasquez  [address]  [facility] [address]      Dear Hermilo,    I am a clinic care coordinator who works with Karson Bishop MD at [facility]. I wanted to thank you for spending the time to talk with me.  Below is a description of clinic care coordination and how I can further assist you.      The clinic care coordination team is made up of a registered nurse,  and community health worker who understand the health care system. The goal of clinic care coordination is to help you manage your health and improve access to the health care system in the most efficient manner. The team can assist you in meeting your health care goals by providing education, coordinating services, strengthening the communication among your providers and supporting you with any resource needs.    Please feel free to contact me at [phone] with any questions or concerns. We are focused on providing you with the highest-quality healthcare experience possible and that all starts with you.     Sincerely,     [facility]     Augusta Swanson RN Care Coordinator   [facility] / [facility]   Phone: [phone]  Email :  [email or web address]      Enclosed: I have enclosed a copy of the Complex Care Plan. This has helpful information and goals that we have talked about. Please keep this in an easy to access place to use as needed.

## 2020-05-08 NOTE — PROGRESS NOTES
Clinic Care Coordination Contact  [facility]/Voicemail    Referral Source: Home Care discharge follow up  [facility]     Discharge Summary  Hospitalist     Date of Admission:  3/22/2020  Date of Discharge:  4/2/2020  Discharging Provider: Charlie Muñiz  Date of Service (when I saw the patient): 04/02/20  Prior GI bleed hematemesis and melena  Admitted with respiratory issues thought due to pneumonia as well as a new pulmonary embolism.  You were treated with antibiotics and started on blood thinners.  You also had anemia and were seen by GI with recommendations for an acid blocker to continue for at least the next 2 months.  5/4/2020 PCP visit     Clinical Data: Care Coordinator Outreach  Outreach attempted x 1.  Unable to hear patient due to crackling sound on the phone.  I informed patient CC will call back next week.    Plan: . Care Coordinator will try to reach patient again in 1-2 business days.  [facility]     Augusta Swanson RN Care Coordinator   [facility] / [facility]   Phone: [phone]  Email :  [email or web address]

## 2020-05-12 SDOH — SOCIAL STABILITY: SOCIAL NETWORK: HOW OFTEN DO YOU ATTENT MEETINGS OF THE CLUB OR ORGANIZATION YOU BELONG TO?: NEVER

## 2020-05-12 SDOH — ECONOMIC STABILITY: FOOD INSECURITY: WITHIN THE PAST 12 MONTHS, THE FOOD YOU BOUGHT JUST DIDN'T LAST AND YOU DIDN'T HAVE MONEY TO GET MORE.: NEVER TRUE

## 2020-05-12 SDOH — ECONOMIC STABILITY: INCOME INSECURITY: HOW HARD IS IT FOR YOU TO PAY FOR THE VERY BASICS LIKE FOOD, HOUSING, MEDICAL CARE, AND HEATING?: NOT HARD AT ALL

## 2020-05-12 SDOH — ECONOMIC STABILITY: TRANSPORTATION INSECURITY
IN THE PAST 12 MONTHS, HAS LACK OF TRANSPORTATION KEPT YOU FROM MEETINGS, WORK, OR FROM GETTING THINGS NEEDED FOR DAILY LIVING?: NO

## 2020-05-12 SDOH — SOCIAL STABILITY: SOCIAL INSECURITY: WITHIN THE LAST YEAR, HAVE YOU BEEN AFRAID OF YOUR PARTNER OR EX-PARTNER?: NO

## 2020-05-12 SDOH — SOCIAL STABILITY: SOCIAL INSECURITY
WITHIN THE LAST YEAR, HAVE YOU BEEN KICKED, HIT, SLAPPED, OR OTHERWISE PHYSICALLY HURT BY YOUR PARTNER OR EX-PARTNER?: NO

## 2020-05-12 SDOH — SOCIAL STABILITY: SOCIAL NETWORK: HOW OFTEN DO YOU ATTEND CHURCH OR RELIGIOUS SERVICES?: NEVER

## 2020-05-12 SDOH — HEALTH STABILITY: PHYSICAL HEALTH: ON AVERAGE, HOW MANY MINUTES DO YOU ENGAGE IN EXERCISE AT THIS LEVEL?: 0 MIN

## 2020-05-12 SDOH — ECONOMIC STABILITY: TRANSPORTATION INSECURITY
IN THE PAST 12 MONTHS, HAS THE LACK OF TRANSPORTATION KEPT YOU FROM MEDICAL APPOINTMENTS OR FROM GETTING MEDICATIONS?: NO

## 2020-05-12 SDOH — HEALTH STABILITY: MENTAL HEALTH
STRESS IS WHEN SOMEONE FEELS TENSE, NERVOUS, ANXIOUS, OR CAN'T SLEEP AT NIGHT BECAUSE THEIR MIND IS TROUBLED. HOW STRESSED ARE YOU?: NOT AT ALL

## 2020-05-12 SDOH — SOCIAL STABILITY: SOCIAL NETWORK: HOW OFTEN DO YOU GET TOGETHER WITH FRIENDS OR RELATIVES?: TWICE A WEEK

## 2020-05-12 SDOH — SOCIAL STABILITY: SOCIAL NETWORK: IN A TYPICAL WEEK, HOW MANY TIMES DO YOU TALK ON THE PHONE WITH FAMILY, FRIENDS, OR NEIGHBORS?: TWICE A WEEK

## 2020-05-12 SDOH — SOCIAL STABILITY: SOCIAL INSECURITY: WITHIN THE LAST YEAR, HAVE YOU BEEN HUMILIATED OR EMOTIONALLY ABUSED IN OTHER WAYS BY YOUR PARTNER OR EX-PARTNER?: NO

## 2020-05-12 SDOH — SOCIAL STABILITY: SOCIAL NETWORK
DO YOU BELONG TO ANY CLUBS OR ORGANIZATIONS SUCH AS CHURCH GROUPS UNIONS, FRATERNAL OR ATHLETIC GROUPS, OR SCHOOL GROUPS?: NO

## 2020-05-12 SDOH — ECONOMIC STABILITY: FOOD INSECURITY: WITHIN THE PAST 12 MONTHS, YOU WORRIED THAT YOUR FOOD WOULD RUN OUT BEFORE YOU GOT MONEY TO BUY MORE.: NEVER TRUE

## 2020-05-12 SDOH — SOCIAL STABILITY: SOCIAL NETWORK: ARE YOU MARRIED, WIDOWED, DIVORCED, SEPARATED, NEVER MARRIED, OR LIVING WITH A PARTNER?: MARRIED

## 2020-05-12 SDOH — SOCIAL STABILITY: SOCIAL INSECURITY
WITHIN THE LAST YEAR, HAVE TO BEEN RAPED OR FORCED TO HAVE ANY KIND OF SEXUAL ACTIVITY BY YOUR PARTNER OR EX-PARTNER?: NO

## 2020-05-12 SDOH — HEALTH STABILITY: PHYSICAL HEALTH: ON AVERAGE, HOW MANY DAYS PER WEEK DO YOU ENGAGE IN MODERATE TO STRENUOUS EXERCISE (LIKE A BRISK WALK)?: 0 DAYS

## 2020-05-12 ASSESSMENT — ACTIVITIES OF DAILY LIVING (ADL): DEPENDENT_IADLS:: INDEPENDENT

## 2020-05-12 NOTE — PROGRESS NOTES
Clinic Care Coordination Contact    Clinic Care Coordination Contact  OUTREACH    Referral Information:  Referral Source: Home Care    Primary Diagnosis: Respiratory Disorders - other    Chief Complaint   Patient presents with     Clinic Care Coordination - Initial     Clinic Care Coordination RN        [place] Utilization: [facility]     Discharge Summary  Hospitalist     Date of Admission:  3/22/2020  Date of Discharge:  4/2/2020  Discharging Provider: Charlie Muñiz  Date of Service (when I saw the patient): 04/02/20  Prior GI bleed hematemesis and melena  Admitted with respiratory issues thought due to pneumonia as well as a new pulmonary embolism.  You were treated with antibiotics and started on blood thinners.  You also had anemia and were seen by GI with recommendations for an acid blocker to continue for at least the next 2 months.  Clinic Utilization  Difficulty keeping appointments:: No  Compliance Concerns: No  No-Show Concerns: No  No PCP office visit in Past Year: No  Utilization    Last refreshed: 5/12/2020  1:47 PM:  Hospital Admissions 3           Last refreshed: 5/12/2020  1:47 PM:  ED Visits 3           Last refreshed: 5/12/2020  1:47 PM:  No Show Count (past year) 0              Current as of: 5/12/2020  1:47 PM              Clinical Concerns:  Current Medical Concerns:  Patient denies any further blood in the stool  Patient uses oxygen at night at 2 liters  Oximeter can read 89% in the morning but quickly goes over 90%   Current Behavioral Concerns: Not discussed   Education Provided to patient: CC introductory letter and care plan e-mailed to patient     Health Maintenance Reviewed: Not assessed  Clinical Pathway: None    Medication Management:  Medication reconciliation status: Medications reviewed and reconciled.  Changed medications per patient report.     Functional Status:  Dependent ADLs:: Ambulation-cane  Bed or wheelchair confined:: No  Mobility Status:  Independent w/Device    Living Situation:  Current living arrangement:: I live in a private home with spouse    Lifestyle & Psychosocial Needs:  Lifestyle     Physical activity     Days per week: 0 days     Minutes per session: 0 min     Stress: Not at all     Social Needs     Financial resource strain: Not hard at all     Food insecurity     Worry: Never true     Inability: Never true     Transportation needs     Medical: No     Non-medical: No              Mental health DX:: No  Mental health management concern (GOAL):: No  Informal Support system:: Spouse   Socioeconomic History     Marital status:      Spouse name: Not on file     Number of children: Not on file     Years of education: Not on file     Highest education level: Not on file   Relationships     Social connections     Talks on phone: Twice a week     Gets together: Twice a week     Attends Sabianist service: Never     Active member of club or organization: No     Attends meetings of clubs or organizations: Never     Relationship status:      Intimate partner violence     Fear of current or ex partner: No     Emotionally abused: No     Physically abused: No     Forced sexual activity: No     Tobacco Use     Smoking status: Former Smoker     Packs/day: 2.00     Years: 55.00     Pack years: 110.00     Last attempt to quit: 1998     Years since quittin.3     Smokeless tobacco: Never Used   Substance and Sexual Activity     Alcohol use: Yes     Alcohol/week: 0.0 standard drinks     Comment: 1 drink per day     Drug use: No     Sexual activity: Not Currently     Partners: Female             Resources and Interventions:  Current Resources:            Equipment Currently Used at Home: cane, straight    Advance Care Plan/Directive  Advanced Care Plans/Directives on file:: Yes  Type Advanced Care Plans/Directives: (June laughlin)  Advanced Care Plan/Directive Status: Not Applicable    Referrals Placed: None     Goals:   Goals         General    Monitoring (pt-stated)     Notes - Note created  5/12/2020  2:04 PM by Augusta Swanson, RN    Goal Statement: I will seek medical help if I have blood in my stool  Date Goal set: 5/12/2020  Barriers: None identified   Strengths: Agrees with the plan  Date to Achieve By: 6/12/2020  Patient expressed understanding of goal: Yes  Action steps to achieve this goal:  1. I will make a future appointment with the Gastroenterologist   2. Care coordinator will follow up in 1-2 weeks   As of today's date 5/12/2020 goal is met at 0 - 25%.   Goal Status:  Active             Patient/Caregiver understanding: Expresses good understanding of discharge instructions     Outreach Frequency: 2 weeks  Future Appointments              In 6 days Karson Bishop MD [facility],           Plan:   Patient will call if experiencing blood in the stool  Patient will make a future GI appointment  CC will follow up in 1-2 weeks  [facility]     Augusta Swanson RN Care Coordinator   [facility] / [facility]   Phone: [phone]  Email :  [email or web address]

## 2020-05-13 ENCOUNTER — TELEPHONE (OUTPATIENT)
Dept: OTHER | Facility: CLINIC | Age: 85
End: 2020-05-13

## 2020-05-13 NOTE — TELEPHONE ENCOUNTER
Patient called stating that he would prefer to wait another week or so before scheduling the lab and virtual visit due to COVID-19. I asked that he continue on Lovenox which he reports he has enough of. He will call back and schedule in a week or so.    Petra CROCKETT, RN    [facility]  [facility]  Office: [phone]  Fax: [phone]

## 2020-05-18 ENCOUNTER — VIRTUAL VISIT (OUTPATIENT)
Dept: FAMILY MEDICINE | Facility: CLINIC | Age: 85
End: 2020-05-18
Payer: COMMERCIAL

## 2020-05-18 DIAGNOSIS — I27.82 OTHER CHRONIC PULMONARY EMBOLISM WITHOUT ACUTE COR PULMONALE (H): ICD-10-CM

## 2020-05-18 DIAGNOSIS — G62.9 NEUROPATHY: ICD-10-CM

## 2020-05-18 DIAGNOSIS — J96.01 ACUTE RESPIRATORY FAILURE WITH HYPOXIA (H): Primary | ICD-10-CM

## 2020-05-18 DIAGNOSIS — R42 DIZZINESS: ICD-10-CM

## 2020-05-18 PROCEDURE — 99214 OFFICE O/P EST MOD 30 MIN: CPT | Mod: 95 | Performed by: INTERNAL MEDICINE

## 2020-05-18 NOTE — PROGRESS NOTES
"Hermilo Velasquez is a 87 year old male who is being evaluated via a billable telephone visit.      The patient has been notified of following:     \"This telephone visit will be conducted via a call between you and your physician/provider. We have found that certain health care needs can be provided without the need for a physical exam.  This service lets us provide the care you need with a short phone conversation.  If a prescription is necessary we can send it directly to your pharmacy.  If lab work is needed we can place an order for that and you can then stop by our lab to have the test done at a later time.    Telephone visits are billed at different rates depending on your insurance coverage. During this emergency period, for some insurers they may be billed the same as an in-person visit.  Please reach out to your insurance provider with any questions.    If during the course of the call the physician/provider feels a telephone visit is not appropriate, you will not be charged for this service.\"    Patient has given verbal consent for Telephone visit?  Yes    What phone number would you like to be contacted at? [phone]    How would you like to obtain your AVS? Mail a copy    Subjective     Hermilo Velasquez is a 87 year old male who presents via phone visit today for the following health issues:    HPI    Follow up pulmonary emboli, neuropathy, BPH, dizziness, chronic hypoxic respiratory failure     He is doing OK off flomax  Improved dizziness symptoms  No significant worsening of urinary symptoms    Neuropathic pain is manageable off daytime gabapentin and less fatigue    He continues with lovenox injections, but hopes to be able to switch to oral anticoagulants at some point.  He delayed his follow up with vascular medicine due to COVID which I think is reasonable    He continues to have peripheral oxygen saturation drop below 88 with ambulation, so he continues require supplemental " oxygen    Patient Active Problem List   Diagnosis     Ventral hernia     Nonrheumatic aortic valve stenosis     GERD (gastroesophageal reflux disease)     Non Hodgkin's lymphoma (H): 2013     Microscopic hematuria     Health Care Home     CARDIOVASCULAR SCREENING; LDL GOAL LESS THAN 130     History of colonic polyps     Neuropathy     Anemia due to blood loss, acute     Physical deconditioning     Paroxysmal atrial fibrillation (H)     Need for SBE (subacute bacterial endocarditis) prophylaxis     RBBB (right bundle branch block)     Gastrointestinal hemorrhage with melena     Primary osteoarthritis of both knees     Primary osteoarthritis of left hip     Pulmonary embolism, bilateral (H)     S/P IVC filter     Long-term (current) use of anticoagulants [Z79.01]     Benign neoplasm of colon, unspecified part of colon     Pulmonary nodules     Benign neoplasm of colon     Primary osteoarthritis of left knee     Essential hypertension with goal blood pressure less than 140/90     Non-Hodgkin's lymphoma, unspecified body region, unspecified non-Hodgkin lymphoma type (H)     Anticoagulated on Coumadin     Other chronic pulmonary embolism without acute cor pulmonale (H)     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     Aftercare following left knee joint replacement surgery     Drainage from wound: left Knee     Lower extremity edema     Leg edema, left     S/P total knee arthroplasty     ACP (advance care planning)     Hyperlipidemia LDL goal <130     Chronic obstructive pulmonary disease, unspecified COPD type (H)     Iron deficiency anemia due to chronic blood loss     Spongiotic dermatitis     Nodule of lower lobe of right lung     Rotator cuff tendinitis, left     S/P rotator cuff surgery     CAP (community acquired pneumonia)     Malignant neoplasm of lung, unspecified laterality, unspecified part of lung (H)     Acute respiratory failure with hypoxia (H)     Hematemesis     Past Surgical History:   Procedure  Laterality Date     APPENDECTOMY       AS TOTAL KNEE ARTHROPLASTY       BACK SURGERY       ESOPHAGOSCOPY, GASTROSCOPY, DUODENOSCOPY (EGD), COMBINED N/A 2015    Procedure: COMBINED ESOPHAGOSCOPY, GASTROSCOPY, DUODENOSCOPY (EGD);  Surgeon: Danis Castillo MD;  Location:  GI     ESOPHAGOSCOPY, GASTROSCOPY, DUODENOSCOPY (EGD), COMBINED N/A 2018    Procedure: COMBINED ESOPHAGOSCOPY, GASTROSCOPY, DUODENOSCOPY (EGD);;  Surgeon: Toby Dong DO;  Location:  GI     HERNIA REPAIR  2006     HERNIORRHAPHY VENTRAL  2013    Procedure: HERNIORRHAPHY VENTRAL;  VENTRAL HERNIA REPAIR WITH MESH;  Surgeon: Patel Guzman MD;  Location:  OR     KNEE SURGERY      arthroscopic right knee surgery      LOBECTOMY LUNG Right 3/26/2019    Procedure: POSSIBLE LOBECTOMY LUNG;  Surgeon: Jose A Vasques MD;  Location:  OR     REPAIR LIGAMENT ANKLE  2012    Procedure:REPAIR LIGAMENT ANKLE; LEFT TARSAL TUNNEL RELEASE OF KNOT OF ARIEL RELEASE; Surgeon:SAUL PUENTE; Location: OR     REPLACE VALVE AORTIC N/A 9/3/2015    Procedure: REPLACE VALVE AORTIC;  Surgeon: Antonino Mitchell MD;  Location:  OR     ROTATOR CUFF REPAIR RT/LT      bilateral     SPINE SURGERY      3 spine surgeries     THORACOTOMY, WEDGE RESECTION LUNG, COMBINED Right 3/26/2019    Procedure: RIGHT THORACOTOMY, WEDGE RESECTION, RIGHT LOWER LOBE LUNG NODULE,;  Surgeon: Jose A Vasques MD;  Location:  OR     TONSILLECTOMY       TURP         Social History     Tobacco Use     Smoking status: Former Smoker     Packs/day: 2.00     Years: 55.00     Pack years: 110.00     Last attempt to quit: 1998     Years since quittin.3     Smokeless tobacco: Never Used   Substance Use Topics     Alcohol use: Yes     Alcohol/week: 0.0 standard drinks     Comment: 1 drink per day     Family History   Problem Relation Age of Onset     C.A.D. Father      Emphysema Father      Melanoma No family hx of       Skin Cancer No family hx of          Current Outpatient Medications   Medication Sig Dispense Refill     acetaminophen (TYLENOL) 500 MG tablet Take 2 tablets by mouth every 4 hours       albuterol (PROAIR HFA/PROVENTIL HFA/VENTOLIN HFA) 108 (90 Base) MCG/ACT Inhaler Inhale 1-2 puffs into the lungs every 6 hours as needed for shortness of breath / dyspnea or wheezing 3 Inhaler 5     atorvastatin (LIPITOR) 10 MG tablet Take 1 tablet (10 mg) by mouth daily 90 tablet 3     enoxaparin ANTICOAGULANT (LOVENOX) 120 MG/0.8ML syringe Inject 0.8 mLs (120 mg) Subcutaneous daily 90 Syringe 3     enoxaparin ANTICOAGULANT (LOVENOX) 80 MG/0.8ML syringe Inject 0.8 mLs (80 mg) Subcutaneous every 12 hours 60 Syringe 3     famotidine (PEPCID) 40 MG tablet Take 1 tablet (40 mg) by mouth 2 times daily as needed (heartburn) 180 tablet 1     gabapentin (NEURONTIN) 300 MG capsule Take 300 mg by mouth every morning       gabapentin (NEURONTIN) 300 MG capsule Take 900 mg by mouth At Bedtime       HYDROcodone-acetaminophen (NORCO) 5-325 MG tablet Take 1 tablet by mouth every 6 hours as needed for pain 18 tablet 0     hydrOXYzine (ATARAX) 25 MG tablet Take 2 tablets (50 mg) by mouth every 6 hours as needed 360 tablet 3     loratadine (CLARITIN) 10 MG tablet Take 10 mg by mouth daily as needed for allergies        metoprolol succinate ER (TOPROL-XL) 25 MG 24 hr tablet Take 0.5 tablets (12.5 mg) by mouth daily 30 tablet 0     order for DME Equipment being ordered: Right neoprene knee brace.  ( Fax to [facility] fax  [phone]) 1 each 0     pantoprazole (PROTONIX) 40 MG EC tablet Take 1 tablet (40 mg) by mouth 2 times daily (before meals) 60 tablet 1     tamsulosin (FLOMAX) 0.4 MG capsule Take 0.4 mg by mouth daily       tiotropium (SPIRIVA HANDIHALER) 18 MCG capsule Inhale 1 capsule (18 mcg) into the lungs daily 90 capsule 3     triamcinolone (KENALOG) 0.1 % external cream Apply topically 2 times daily as needed for irritation 453.6 g 3  "    Allergies   Allergen Reactions     Lidocaine Blisters     Allergy to lidocaine ointment     Omeprazole Itching     Pantoprazole Itching     Prevacid [Lansoprazole] Itching     Lasix [Furosemide] Rash     Penicillins Rash     \"broke out from injection\" 60 yrs ago         Reviewed and updated as needed this visit by Provider         Review of Systems          Objective   Reported vitals:  There were no vitals taken for this visit.     PSYCH: Alert and oriented times 3; a little forgetful, coherent speech, normal   rate and volume, able to articulate logical thoughts.  His affect is normal  RESP: No cough, no audible wheezing, able to talk in full sentences  Remainder of exam unable to be completed due to telephone visits            Assessment/Plan:  1. Acute respiratory failure with hypoxia (H)  Continue to try to wean supplemental oxygen   He may require supplemental oxygen indefinitely due to his pneumonectomy, it will be easier to determine his need for oxygen when it is safer to see him face to face and check with and without oxygen sats with rest and ambulation.  Will schedule him for a face to face office visit as soon as it safer with respect to COVID; addressing COPD may be an actionable target for decreasing supplemental oxygen requirement     2. Other chronic pulmonary embolism without acute cor pulmonale (H)  Meet with vascular medicine as soon as safer re COVID to determine if he is a candidate for oral anticoagulants     3. Neuropathy  Improved fatigue and dizziness symptoms off day time gabapentin ; symptoms tolerable     4. Dizziness  Improved of Flomax without worsening or intolerable LUTS      Return in about 2 months (around 7/18/2020) for recheck.  Patient instructed to return to clinic or contact us sooner if symptoms worsen or new symptoms develop.     Phone call duration:  18 minutes    Karson Bishop MD        "

## 2020-05-29 ENCOUNTER — MEDICAL CORRESPONDENCE (OUTPATIENT)
Dept: HEALTH INFORMATION MANAGEMENT | Facility: CLINIC | Age: 85
End: 2020-05-29

## 2020-06-17 ENCOUNTER — PATIENT OUTREACH (OUTPATIENT)
Dept: CARE COORDINATION | Facility: CLINIC | Age: 85
End: 2020-06-17

## 2020-06-17 DIAGNOSIS — K92.0 HEMATEMESIS: Primary | ICD-10-CM

## 2020-06-17 DIAGNOSIS — K92.1 GASTROINTESTINAL HEMORRHAGE WITH MELENA: ICD-10-CM

## 2020-06-17 ASSESSMENT — ACTIVITIES OF DAILY LIVING (ADL): DEPENDENT_IADLS:: INDEPENDENT

## 2020-06-17 NOTE — PROGRESS NOTES
Clinic Care Coordination Contact  [facility]/Voicemail    Referral Source: Home Care  [place] Utilization: [facility]     Discharge Summary  Hospitalist     Date of Admission:  3/22/2020  Date of Discharge:  4/2/2020  Discharging Provider: Charlie Muñiz  Date of Service (when I saw the patient): 04/02/20  Prior GI bleed hematemesis and melena  Admitted with respiratory issues thought due to pneumonia as well as a new pulmonary embolism.  You were treated with antibiotics and started on blood thinners.  You also had anemia and were seen by GI with recommendations for an acid blocker to continue for at least the next 2 months.    Clinical Data: Care Coordinator Outreach  Outreach attempted x 1.  No answer and no VM to leave a message .  Plan:  Care Coordinator will try to reach patient again in 3-5 business days.  [facility]     Augusta Swanson RN Care Coordinator   [facility] / [facility] -[facility]   Phone: [phone]  Email :  [email or web address]

## 2020-06-18 DIAGNOSIS — E78.2 MIXED HYPERLIPIDEMIA: ICD-10-CM

## 2020-06-18 DIAGNOSIS — I26.99 ACUTE PULMONARY EMBOLISM WITHOUT ACUTE COR PULMONALE, UNSPECIFIED PULMONARY EMBOLISM TYPE (H): ICD-10-CM

## 2020-06-18 DIAGNOSIS — Z95.2 S/P AVR: ICD-10-CM

## 2020-06-18 LAB
BASOPHILS # BLD AUTO: 0 10E9/L (ref 0–0.2)
BASOPHILS NFR BLD AUTO: 0.4 %
DIFFERENTIAL METHOD BLD: ABNORMAL
EOSINOPHIL # BLD AUTO: 1.7 10E9/L (ref 0–0.7)
EOSINOPHIL NFR BLD AUTO: 21.1 %
ERYTHROCYTE [DISTWIDTH] IN BLOOD BY AUTOMATED COUNT: 14.7 % (ref 10–15)
HCT VFR BLD AUTO: 30.2 % (ref 40–53)
HGB BLD-MCNC: 9.1 G/DL (ref 13.3–17.7)
LYMPHOCYTES # BLD AUTO: 1.1 10E9/L (ref 0.8–5.3)
LYMPHOCYTES NFR BLD AUTO: 13.6 %
MCH RBC QN AUTO: 24.5 PG (ref 26.5–33)
MCHC RBC AUTO-ENTMCNC: 30.1 G/DL (ref 31.5–36.5)
MCV RBC AUTO: 81 FL (ref 78–100)
MONOCYTES # BLD AUTO: 1 10E9/L (ref 0–1.3)
MONOCYTES NFR BLD AUTO: 13.2 %
NEUTROPHILS # BLD AUTO: 4.1 10E9/L (ref 1.6–8.3)
NEUTROPHILS NFR BLD AUTO: 51.7 %
PLATELET # BLD AUTO: 281 10E9/L (ref 150–450)
RBC # BLD AUTO: 3.72 10E12/L (ref 4.4–5.9)
WBC # BLD AUTO: 7.9 10E9/L (ref 4–11)

## 2020-06-18 PROCEDURE — 36415 COLL VENOUS BLD VENIPUNCTURE: CPT | Performed by: INTERNAL MEDICINE

## 2020-06-18 PROCEDURE — 85025 COMPLETE CBC W/AUTO DIFF WBC: CPT | Performed by: INTERNAL MEDICINE

## 2020-06-18 PROCEDURE — 84460 ALANINE AMINO (ALT) (SGPT): CPT | Performed by: INTERNAL MEDICINE

## 2020-06-18 PROCEDURE — 80061 LIPID PANEL: CPT | Performed by: INTERNAL MEDICINE

## 2020-06-19 ENCOUNTER — TELEPHONE (OUTPATIENT)
Dept: CARDIOLOGY | Facility: CLINIC | Age: 85
End: 2020-06-19

## 2020-06-19 LAB
ALT SERPL W P-5'-P-CCNC: 14 U/L (ref 0–70)
CHOLEST SERPL-MCNC: 107 MG/DL
HDLC SERPL-MCNC: 32 MG/DL
LDLC SERPL CALC-MCNC: 53 MG/DL
NONHDLC SERPL-MCNC: 75 MG/DL
TRIGL SERPL-MCNC: 109 MG/DL

## 2020-06-19 NOTE — TELEPHONE ENCOUNTER
Left message with patient that his lipid profile from yesterday came back and his LDL of 53 on Lipitor 10 MG is at goal. I asked that he call us back to discuss and answer any questions he might have. He has no scheduled return visit.

## 2020-06-19 NOTE — TELEPHONE ENCOUNTER
Patient returned call and I explained to him that his cholesterol numbers look good ( LDL 53) on Lipitor 10 MG. He states that he is feeling good. This coming November he will have an echocardiogram and a visit with Dr. Grijalva. All questions answered and I will mail copy of report to him today.       Ref Range & Units  1d ago 3yr ago 6yr ago   Cholesterol  <200 mg/dL  107   126   152 CM     Triglycerides  <150 mg/dL  109   67 CM   71 R     HDL Cholesterol  >39 mg/dL  32Low     44   48 R     LDL Cholesterol Calculated  <100 mg/dL  53   69 CM   90 R, CM     Comment: Desirable:       <100 mg/dl    Non HDL Cholesterol  <130 mg/dL  75   82      Resulting Agency   [facility]

## 2020-06-26 ENCOUNTER — VIRTUAL VISIT (OUTPATIENT)
Dept: OTHER | Facility: CLINIC | Age: 85
End: 2020-06-26
Attending: INTERNAL MEDICINE
Payer: COMMERCIAL

## 2020-06-26 DIAGNOSIS — I26.99 ACUTE PULMONARY EMBOLISM WITHOUT ACUTE COR PULMONALE, UNSPECIFIED PULMONARY EMBOLISM TYPE (H): ICD-10-CM

## 2020-06-26 PROCEDURE — 99213 OFFICE O/P EST LOW 20 MIN: CPT | Mod: 95 | Performed by: INTERNAL MEDICINE

## 2020-06-26 RX ORDER — WARFARIN SODIUM 5 MG/1
1 TABLET ORAL EVERY 24 HOURS
COMMUNITY
End: 2020-06-26 | Stop reason: ALTCHOICE

## 2020-06-26 NOTE — PROGRESS NOTES
"Hermilo Velasquez is a 87 year old male who is being evaluated via a billable telephone visit.      The patient has been notified of following:     \"This telephone visit will be conducted via a call between you and your physician/provider. We have found that certain health care needs can be provided without the need for a physical exam.  This service lets us provide the care you need with a short phone conversation.  If a prescription is necessary we can send it directly to your pharmacy.  If lab work is needed we can place an order for that and you can then stop by our lab to have the test done at a later time.    Telephone visits are billed at different rates depending on your insurance coverage. During this emergency period, for some insurers they may be billed the same as an in-person visit.  Please reach out to your insurance provider with any questions.    If during the course of the call the physician/provider feels a telephone visit is not appropriate, you will not be charged for this service.\"    Patient has given verbal consent for Telephone visit?  Yes    What phone number would you like to be contacted at?  [phone]    How would you like to obtain your AVS? Mail a copy    Patient does not take vitals at home      "

## 2020-06-26 NOTE — PROGRESS NOTES
Vascular Medicine Progress Note     Hermilo Velasquez is a 87 year old male who interviewed over the phone for 12 minutes    Interval History   Patient is here for or was interviewed over the phone for follow-up on recently diagnosed acute PE, patient has a longstanding history of lung CA, last treatment was received almost a year ago, patient has no evidence of metastases or new lesions, patient was started on Lovenox and has been on Lovenox for a long time he has not been able to follow-up secondary to COVID-19 situation  Patient has no evidence of bleeding, bruising, no evidence of GI bleed  We will switch the patient to Xarelto 20 mg p.o. daily with food will continue for another 3 months  Follow-up in 3 months  We will check CBC with differential count prior to starting the Xarelto then will follow-up with another CBC with differential count for fear of occult GI bleed   Physical Exam                      There were no vitals filed for this visit.  Vital Signs with Ranges  BP: ()/()   Arterial Line BP: ()/()   [unfilled]    .     Medications         Data   No results found for this or any previous visit (from the past 24 hour(s)).    Assessment & Plan   (I26.99) Acute pulmonary embolism without acute cor pulmonale, unspecified pulmonary embolism type (H)  Comment: Xarelto 20 mg p.o. daily          Summary: Follow-up in 3 months    Jamie Ospina MD

## 2020-06-29 ENCOUNTER — TELEPHONE (OUTPATIENT)
Dept: OTHER | Facility: CLINIC | Age: 85
End: 2020-06-29

## 2020-06-29 NOTE — TELEPHONE ENCOUNTER
[facility]    Who is the name of the provider?:  Bhavesh      What is the location you see this provider at?: [place]    Reason for call:  Concern re Xarelto.  Rx info states not to use if you have a valve replacement, which he does.     Can we leave a detailed message on this number?  YES

## 2020-06-29 NOTE — TELEPHONE ENCOUNTER
Patient has history bioprosthetic aortic valve.  Discussed with Dr. Ospina and he states patient should be on Xarelto and his valve is prosthetic not mechanical which would require coumadin.  He verbalized understanding.     Petra CROCKETT, RN    [facility]  Office: [phone]  Fax: [phone]

## 2020-06-30 DIAGNOSIS — I26.99 ACUTE PULMONARY EMBOLISM WITHOUT ACUTE COR PULMONALE, UNSPECIFIED PULMONARY EMBOLISM TYPE (H): ICD-10-CM

## 2020-06-30 NOTE — TELEPHONE ENCOUNTER
Patient called requesting his Xarelto RX be faxed to [phone] along with last office note. RX faxed.    Petra CROCKETT, RN    [facility]  Office: [phone]  Fax: [phone]

## 2020-07-01 ENCOUNTER — PATIENT OUTREACH (OUTPATIENT)
Dept: CARE COORDINATION | Facility: CLINIC | Age: 85
End: 2020-07-01

## 2020-07-01 ASSESSMENT — ACTIVITIES OF DAILY LIVING (ADL): DEPENDENT_IADLS:: INDEPENDENT

## 2020-07-01 NOTE — PROGRESS NOTES
Clinic Care Coordination Contact  [facility]/Voicemail    Date of Admission:  3/22/2020  Date of Discharge:  4/2/2020  Discharging Provider: Charlie Muñiz  Date of Service (when I saw the patient): 04/02/20  Prior GI bleed hematemesis and melena  Admitted with respiratory issues thought due to pneumonia as well as a new pulmonary embolism.  You were treated with antibiotics and started on blood thinners.  You also had anemia and were seen by GI with recommendations for an acid blocker to continue for at least the next 2 months.    Referral Source: Home Care    Previous goal  Goal Statement: I will seek medical help if I have blood in my stool  Date Goal set: 5/12/2020  Barriers: None identified   Strengths: Agrees with the plan  Date to Achieve By: 8/12/2020  Patient expressed understanding of goal: Yes  Action steps to achieve this goal:  1. I will make a future appointment with the Gastroenterologist   2. Care coordinator will follow up in 1-2 weeks   As of today's date 5/12/2020 goal is met at 0 - 25%.   Goal Status:  Active     Clinical Data: Care Coordinator Outreach  Outreach attempted x 1.  Left message on Wife voicemail with call back information and requested return call.  Plan: Care Coordinator will await a return call from the patient  [facility] Nory Swanson RN Care Coordinator   [facility] Nory / [facility] -[facility]   Phone: [phone]  Email :  [email or web address]

## 2020-07-02 ENCOUNTER — PATIENT OUTREACH (OUTPATIENT)
Dept: NURSING | Facility: CLINIC | Age: 85
End: 2020-07-02
Payer: COMMERCIAL

## 2020-07-02 DIAGNOSIS — K92.0 HEMATEMESIS: Primary | ICD-10-CM

## 2020-07-02 ASSESSMENT — ACTIVITIES OF DAILY LIVING (ADL): DEPENDENT_IADLS:: INDEPENDENT

## 2020-07-02 NOTE — LETTER
"[place] CARE COORDINATION  [address]  [place] [address]    July 2, 2020    Hermilo Velasquez  [address]  [place] [address]    Dear Hermilo,  Your Care Team congratulates you on your journey to maintain wellness. This document will help guide you on your journey to maintain a healthy lifestyle.  You can use this to help you overcome any barriers you may encounter.  If you should have any questions or concerns, you can contact the members of your Care Team or contact your Primary Care Clinic for assistance.     Health Maintenance  Health Maintenance Reviewed: Not assessed    My Access Plan  Medical Emergency 911   Primary Clinic Line [facility] - [phone]   24 Hour Appointment Line [phone] or  [phone]) (toll-free)   24 Hour Nurse Line [phone] (toll-free)   Preferred Urgent Care [facility], [phone]   Preferred Hospital [facility] [phone]   Preferred Pharmacy [id number] - [place], [place] - [address] Behavioral Health Crisis Line The National Suicide Prevention Lifeline at 1-597.961.8971 or 911     My Care Team Members  Patient Care Team       Relationship Specialty Notifications Start End    Karson Bishop MD PCP - General Internal Medicine  2/6/18     Phone: [phone] Pager: [phone] Fax: [phone] [address] [place] [address]    Margarita Aguirre MD MD Orthopedics  8/30/16     Phone: [phone] Fax: [phone]         MARGARITA AGUIRRE MD [address] [place] [address]    Karson Bishop MD Assigned PCP   2/18/18     Phone: [phone] Pager: [phone] Fax: [phone] [address] [place] [address]              Goals       COMPLETED: Medical (pt-stated)      \"I would like to learn more about fluid overload and ways to improve fatigue\"    I will work with care coordination to review signs and symptoms " of fluid overload.  We will discuss diet, exercise, lifestyle and medication management.    As of today's date 1/27/2016 goal is met at 76 - 100%.   Goal Status:  Discontinued               Goal Statement: I will seek medical help if I have blood in my stool  Date Goal set: 5/12/2020  Barriers: None identified   Strengths: Agrees with the plan  Date to Achieve By: 8/12/2020  Patient expressed understanding of goal: Yes  Action steps to achieve this goal:  1. I will make a future appointment with the Gastroenterologist   2. Care coordinator will follow up in 1-2 weeks   As of today's date 7/2/2020 goal is met at 76 - 100%.   Goal Status:  Complete     Advance Care Plans/Directives Type:   Type Advanced Care Plans/Directives: (June laughlin)  Thank you for providing us with your Advance Directive. We will keep this on file and recommend that it be updated every 2 years, if your health situation changes, if there is a death of one of your health care agents, and/or if there are changes in your marital status.    It has been your Clinic Care Team's pleasure to work with you on your goals.    Regards,  Your Clinic Care Team  [facility] Swanson RN Care Coordinator   [facility] / [facility] -[facility]   Phone: [phone]  Email :  [email or web address]

## 2020-07-02 NOTE — PROGRESS NOTES
Clinic Care Coordination Contact    Follow Up Progress Note  Home care discharge call previously  [place] Utilization: [facility]     Discharge Summary  Hospitalist     Date of Admission:  3/22/2020  Date of Discharge:  4/2/2020  Discharging Provider: Charlie Muñiz  Date of Service (when I saw the patient): 04/02/20  Prior GI bleed hematemesis and melena  Admitted with respiratory issues thought due to pneumonia as well as a new pulmonary embolism.  You were treated with antibiotics and started on blood thinners.  You also had anemia and were seen by GI with recommendations for an acid blocker to continue for at least the next 2 months.      Assessment: Denies any further episodes of blood in the stool or Hematemesis since he switched from Lovenox to Xarelto   Patient will make a future appointment with PCP at the end of the month.  Using oxygen at 2 liters as needed.  Pacing activity    Goals addressed this encounter:   Goals Addressed                 This Visit's Progress      Monitoring (pt-stated)        Goal Statement: I will seek medical help if I have blood in my stool  Date Goal set: 5/12/2020  Barriers: None identified   Strengths: Agrees with the plan  Date to Achieve By: 8/12/2020  Patient expressed understanding of goal: Yes  Action steps to achieve this goal:  1. I will make a future appointment with the Gastroenterologist   2. Care coordinator will follow up in 1-2 weeks   As of today's date 7/2/2020 goal is met at 76 - 100%.   Goal Status:  Complete              Social Determinants of Health      Tobacco Use  Medium Risk     Alcohol Use  Not on file        Financial Resource Strain  Low Risk      Food Insecurity  No Food Insecurity        Transportation Needs  No Transportation Needs     Physical Activity  Inactive        Stress  No Stress Concern Present     Social Connections  Somewhat Isolated        Intimate Partner Violence  Not At Risk     Depression  Not at risk         Housing Stability  Not on file             Find community resources          Intervention/Education provided during outreach: Graduation letter e-mailed to patient           Plan:   No unmet needs,no further care coordination needed at this time   [facility]     Augusta Swanson  RN Care Coordinator   [facility] / [facility]   Phone: [phone]  Email :  [email or web address]

## 2020-07-07 ENCOUNTER — TRANSFERRED RECORDS (OUTPATIENT)
Dept: HEALTH INFORMATION MANAGEMENT | Facility: CLINIC | Age: 85
End: 2020-07-07

## 2020-07-07 LAB
ALT SERPL-CCNC: 8 U/L
AST SERPL-CCNC: 18 U/L (ref 17–59)
CREAT SERPL-MCNC: 1.37 MG/DL (ref 0.66–1.25)
GFR SERPL CREATININE-BSD FRML MDRD: 49 ML/MIN/1.73M2
GLUCOSE SERPL-MCNC: 139 MG/DL (ref 75–100)
POTASSIUM SERPL-SCNC: 4.6 MMOL/L (ref 3.5–5.1)

## 2020-07-08 ENCOUNTER — TELEPHONE (OUTPATIENT)
Dept: OTHER | Facility: CLINIC | Age: 85
End: 2020-07-08

## 2020-07-08 DIAGNOSIS — I26.99 PULMONARY EMBOLISM, BILATERAL (H): Primary | ICD-10-CM

## 2020-07-08 NOTE — TELEPHONE ENCOUNTER
Returned call to [facility]  Anticoagulation Clinic [phone].   [facility] states they are recommending changing the patient from Xarelto to Eliquis due to patients hx of GI bleed. I stated I will discuss with Dr. Ospina Friday and call back at that time.    Petra CROCKETT, RN    [facility]  Vascular [facility]  Office: [phone]  Fax: [phone]

## 2020-07-08 NOTE — TELEPHONE ENCOUNTER
[facility]    Who is the name of the provider?:  Bhavesh      What is the location you see this provider at?: [place]    Reason for call:  Question re Xarelto, can they substitute Eliquis instead due to recent GI bleed.    Can we leave a detailed message on this number?  YES

## 2020-07-13 ENCOUNTER — TELEPHONE (OUTPATIENT)
Dept: FAMILY MEDICINE | Facility: CLINIC | Age: 85
End: 2020-07-13

## 2020-07-13 NOTE — TELEPHONE ENCOUNTER
Called Pt to discuss and switch to telephone visit tomorrow (from in clinic appt) at 2:30pm- appt has been changed.     No answer, phone rings and rings    Marking RECALL

## 2020-07-13 NOTE — TELEPHONE ENCOUNTER
Reason for call:  Patient reporting a symptom    Symptom or request: feet swelling post hospitalization    Duration (how long have symptoms been present):  6 days    Have you been treated for this before? No    Additional comments: started having feet swelling while in the [facility] [place] [facility] is working on changing some medication    Phone Number patient can be reached at:  Home number on file [phone] (home)    Best Time:  any    Can we leave a detailed message on this number:  YES    Call taken on 7/13/2020 at 11:44 AM by Haydee Canales

## 2020-07-13 NOTE — TELEPHONE ENCOUNTER
"To PCP: Please see below- pt is concerned he won't get his shoes on for Hosp F/u Visit tomorrow- states he may have to come in his \"stockings' No means to do Video Visit    Please advise, if there are recommendations today, other than to elevate.     Thank you,   Chrissy ALICEA RN      Spoke with Pt:     S: Edema noted after hospital stay     Likely fluid-overload (see BNP)     B:  7/7- 7/10  Hospitalization for acute blood loss anemia     Received 2 units PRBCs, iron, abx for pna, likely quite a bit of IVF.     PRO-BNP (07/09/2020 6:50 AM CDT)  Only the most recent of 2 results within the time period is included.    PRO-BNP (07/09/2020 6:50 AM CDT)   Component Value Ref Range Performed At Pathologist Signature   PRO-BNP [facility] 5,050 (H) 5 - 450 pg/mL [facility] LABORATORY      Also had CT for SOB - Small right pleural effusion with associated compressive atelectasis.    A:   LE Edema  Onset: after hospital stay     R>L (there is note of a right foot xray during hospital stay- \"marked soft tissue swelling over the forefoot\" \"no acute osseous injury\"     Feels slightly numb \"used to pins and needles\"     Breathing is OK, now, at home was able to lie flat to sleep last night    Denies redness/warmth   Denies fever     We discussed that Pt likely received quite a bit of fluids in the hospital    R: Hosp f/u with PCP scheduled 7/14/2020- ELEVATE legs as much as able. Call with worsening symptoms.         Other notes about hospital stay/follow-up (Cardiology)     -They are not sure where the blood loss was coming from-- needs repeat colonoscopy \"as soon as I call them\"     - Switched from Xarelto to Eliquis by Cardiology on F/U  ([facility] and then he will begin to take Eliquis and STOP Xarelto)                "

## 2020-07-14 ENCOUNTER — VIRTUAL VISIT (OUTPATIENT)
Dept: FAMILY MEDICINE | Facility: CLINIC | Age: 85
End: 2020-07-14
Payer: COMMERCIAL

## 2020-07-14 VITALS — HEART RATE: 64 BPM | SYSTOLIC BLOOD PRESSURE: 120 MMHG | DIASTOLIC BLOOD PRESSURE: 57 MMHG

## 2020-07-14 DIAGNOSIS — E87.70 HYPERVOLEMIA, UNSPECIFIED HYPERVOLEMIA TYPE: Primary | ICD-10-CM

## 2020-07-14 DIAGNOSIS — J18.9 PNEUMONIA DUE TO INFECTIOUS ORGANISM, UNSPECIFIED LATERALITY, UNSPECIFIED PART OF LUNG: ICD-10-CM

## 2020-07-14 DIAGNOSIS — D50.9 IRON DEFICIENCY ANEMIA, UNSPECIFIED IRON DEFICIENCY ANEMIA TYPE: ICD-10-CM

## 2020-07-14 PROCEDURE — 99214 OFFICE O/P EST MOD 30 MIN: CPT | Mod: 95 | Performed by: INTERNAL MEDICINE

## 2020-07-14 RX ORDER — CEFDINIR 300 MG/1
CAPSULE ORAL
Status: ON HOLD | COMMUNITY
Start: 2020-07-11 | End: 2020-08-15

## 2020-07-14 RX ORDER — TORSEMIDE 10 MG/1
10 TABLET ORAL DAILY
Qty: 7 TABLET | Refills: 0 | Status: SHIPPED | OUTPATIENT
Start: 2020-07-14 | End: 2020-07-22

## 2020-07-14 NOTE — PROGRESS NOTES
"Hermilo Velasquez is a 87 year old male who is being evaluated via a billable telephone visit.      The patient has been notified of following:     \"This telephone visit will be conducted via a call between you and your physician/provider. We have found that certain health care needs can be provided without the need for a physical exam.  This service lets us provide the care you need with a short phone conversation.  If a prescription is necessary we can send it directly to your pharmacy.  If lab work is needed we can place an order for that and you can then stop by our lab to have the test done at a later time.    Telephone visits are billed at different rates depending on your insurance coverage. During this emergency period, for some insurers they may be billed the same as an in-person visit.  Please reach out to your insurance provider with any questions.    If during the course of the call the physician/provider feels a telephone visit is not appropriate, you will not be charged for this service.\"    Patient has given verbal consent for Telephone visit?  Yes    What phone number would you like to be contacted at? [phone]     How would you like to obtain your AVS? Mail a copy    Subjective     Hermilo Velasquez is a 87 year old male who presents via phone visit today for the following health issues:    [facility]    Hospital Follow-up Visit:    Hospital/Nursing Home/ Rehab Facility: [facility]  Date of Admission: 7/7/2020  Date of Discharge: 7/10/2020  Reason(s) for Admission: edema in both feet      Was your hospitalization related to COVID-19? No   Problems taking medications regularly:  None  Medication changes since discharge: None  Problems adhering to non-medication therapy:  None    Summary of hospitalization:  [facility] hospital discharge summary reviewed  Diagnostic Tests/Treatments reviewed.  Follow up needed: possible additional iron infusions recommended  Other Healthcare Providers " Involved in Patient s Care:         None  Update since discharge: improved. Post Discharge Medication Reconciliation: discharge medications reconciled and changed, per note/orders (see AVS).  Plan of care communicated with patient               Hospitalized for weakness and fever  Diagnosed with pneumonia  His fevers are better, he does not have a cough  He was noted to have anemia  His EGD was negative  He was restarted on Eliquis  He feels OK  He has swollen feet  He denies dyspnea, orthopnea, or PND  He got a lot of IVFs      Patient Active Problem List   Diagnosis     Ventral hernia     Nonrheumatic aortic valve stenosis     GERD (gastroesophageal reflux disease)     Non Hodgkin's lymphoma (H): 2013     Microscopic hematuria     Health Care Home     CARDIOVASCULAR SCREENING; LDL GOAL LESS THAN 130     History of colonic polyps     Neuropathy     Anemia due to blood loss, acute     Physical deconditioning     Paroxysmal atrial fibrillation (H)     Need for SBE (subacute bacterial endocarditis) prophylaxis     RBBB (right bundle branch block)     Gastrointestinal hemorrhage with melena     Primary osteoarthritis of both knees     Primary osteoarthritis of left hip     Pulmonary embolism, bilateral (H)     S/P IVC filter     Long-term (current) use of anticoagulants [Z79.01]     Benign neoplasm of colon, unspecified part of colon     Pulmonary nodules     Benign neoplasm of colon     Primary osteoarthritis of left knee     Essential hypertension with goal blood pressure less than 140/90     Non-Hodgkin's lymphoma, unspecified body region, unspecified non-Hodgkin lymphoma type (H)     Anticoagulated on Coumadin     Other chronic pulmonary embolism without acute cor pulmonale (H)     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     Aftercare following left knee joint replacement surgery     Drainage from wound: left Knee     Lower extremity edema     Leg edema, left     S/P total knee arthroplasty     ACP (advance  care planning)     Hyperlipidemia LDL goal <130     Chronic obstructive pulmonary disease, unspecified COPD type (H)     Iron deficiency anemia due to chronic blood loss     Spongiotic dermatitis     Nodule of lower lobe of right lung     Rotator cuff tendinitis, left     S/P rotator cuff surgery     CAP (community acquired pneumonia)     Malignant neoplasm of lung, unspecified laterality, unspecified part of lung (H)     Acute respiratory failure with hypoxia (H)     Hematemesis     Past Surgical History:   Procedure Laterality Date     APPENDECTOMY       AS TOTAL KNEE ARTHROPLASTY       BACK SURGERY       ESOPHAGOSCOPY, GASTROSCOPY, DUODENOSCOPY (EGD), COMBINED N/A 11/28/2015    Procedure: COMBINED ESOPHAGOSCOPY, GASTROSCOPY, DUODENOSCOPY (EGD);  Surgeon: Danis Castillo MD;  Location:  GI     ESOPHAGOSCOPY, GASTROSCOPY, DUODENOSCOPY (EGD), COMBINED N/A 7/26/2018    Procedure: COMBINED ESOPHAGOSCOPY, GASTROSCOPY, DUODENOSCOPY (EGD);;  Surgeon: Toby Dong DO;  Location:  GI     HERNIA REPAIR  2006     HERNIORRHAPHY VENTRAL  4/17/2013    Procedure: HERNIORRHAPHY VENTRAL;  VENTRAL HERNIA REPAIR WITH MESH;  Surgeon: Patel Guzman MD;  Location:  OR     KNEE SURGERY      arthroscopic right knee surgery      LOBECTOMY LUNG Right 3/26/2019    Procedure: POSSIBLE LOBECTOMY LUNG;  Surgeon: Jose A Vasques MD;  Location:  OR     REPAIR LIGAMENT ANKLE  2/23/2012    Procedure:REPAIR LIGAMENT ANKLE; LEFT TARSAL TUNNEL RELEASE OF KNOT OF ARIEL RELEASE; Surgeon:SAUL PUENTE; Location: OR     REPLACE VALVE AORTIC N/A 9/3/2015    Procedure: REPLACE VALVE AORTIC;  Surgeon: Antonino Mitchell MD;  Location:  OR     ROTATOR CUFF REPAIR RT/LT      bilateral     SPINE SURGERY      3 spine surgeries     THORACOTOMY, WEDGE RESECTION LUNG, COMBINED Right 3/26/2019    Procedure: RIGHT THORACOTOMY, WEDGE RESECTION, RIGHT LOWER LOBE LUNG NODULE,;  Surgeon: Jose A Vasques,  MD;  Location: [facility] OR     TONSILLECTOMY       TURP         Social History     Tobacco Use     Smoking status: Former Smoker     Packs/day: 2.00     Years: 55.00     Pack years: 110.00     Last attempt to quit: 1998     Years since quittin.4     Smokeless tobacco: Never Used   Substance Use Topics     Alcohol use: Yes     Alcohol/week: 0.0 standard drinks     Comment: 1 drink per day     Family History   Problem Relation Age of Onset     C.A.D. Father      Emphysema Father      Melanoma No family hx of      Skin Cancer No family hx of          Current Outpatient Medications   Medication Sig Dispense Refill     acetaminophen (TYLENOL) 500 MG tablet Take 2 tablets by mouth every 4 hours       albuterol (PROAIR HFA/PROVENTIL HFA/VENTOLIN HFA) 108 (90 Base) MCG/ACT Inhaler Inhale 1-2 puffs into the lungs every 6 hours as needed for shortness of breath / dyspnea or wheezing 3 Inhaler 5     apixaban ANTICOAGULANT (ELIQUIS ANTICOAGULANT) 5 MG tablet 20 mg daily       apixaban ANTICOAGULANT (ELIQUIS ANTICOAGULANT) 5 MG tablet Take 1 tablet (5 mg) by mouth 2 times daily 60 tablet 11     atorvastatin (LIPITOR) 10 MG tablet Take 1 tablet (10 mg) by mouth daily 90 tablet 3     cefdinir (OMNICEF) 300 MG capsule Take 1 capsule by mouth every 12 hours for 7 days. Indications: a lower respiratory infection       famotidine (PEPCID) 40 MG tablet Take 1 tablet (40 mg) by mouth 2 times daily as needed (heartburn) 180 tablet 1     gabapentin (NEURONTIN) 300 MG capsule Take 300 mg by mouth every morning       gabapentin (NEURONTIN) 300 MG capsule Take 900 mg by mouth At Bedtime       HYDROcodone-acetaminophen (NORCO) 5-325 MG tablet Take 1 tablet by mouth every 6 hours as needed for pain 18 tablet 0     hydrOXYzine (ATARAX) 25 MG tablet Take 2 tablets (50 mg) by mouth every 6 hours as needed 360 tablet 3     loratadine (CLARITIN) 10 MG tablet Take 10 mg by mouth daily as needed for allergies        metoprolol succinate ER  "(TOPROL-XL) 25 MG 24 hr tablet Take 0.5 tablets (12.5 mg) by mouth daily 30 tablet 0     omeprazole (PRILOSEC) 20 MG DR capsule Take 1 capsule by mouth every 24 hours       order for DME Equipment being ordered: Right neoprene knee brace.  ( Fax to [facility] fax  [phone]) 1 each 0     pantoprazole (PROTONIX) 40 MG EC tablet Take 1 tablet (40 mg) by mouth 2 times daily (before meals) 60 tablet 1     rivaroxaban ANTICOAGULANT (XARELTO ANTICOAGULANT) 20 MG TABS tablet Take 20 mg by mouth       tamsulosin (FLOMAX) 0.4 MG capsule Take 0.4 mg by mouth daily       tiotropium (SPIRIVA HANDIHALER) 18 MCG capsule Inhale 1 capsule (18 mcg) into the lungs daily 90 capsule 3     torsemide (DEMADEX) 10 MG tablet Take 1 tablet (10 mg) by mouth daily 7 tablet 0     triamcinolone (KENALOG) 0.1 % external cream Apply topically 2 times daily as needed for irritation 453.6 g 3     Allergies   Allergen Reactions     Lidocaine Blisters     Allergy to lidocaine ointment     Omeprazole Itching     Pantoprazole Itching     Prevacid [Lansoprazole] Itching     Lasix [Furosemide] Rash     Penicillins Rash     \"broke out from injection\" 60 yrs ago         Reviewed and updated as needed this visit by Provider         Review of Systems   Constitutional, HEENT, cardiovascular, pulmonary, gi and gu systems are negative, except as otherwise noted.       Objective   Reported vitals:  /57   Pulse 64    healthy, alert and no distress  PSYCH: Alert and oriented times 3; coherent speech, normal   rate and volume, able to articulate logical thoughts, able   to abstract reason, no tangential thoughts, no hallucinations   or delusions  His affect is normal  RESP: No cough, no audible wheezing, able to talk in full sentences  Remainder of exam unable to be completed due to telephone visits    Diagnostic Test Results:  Labs reviewed in Epic        Assessment/Plan:  1. Hypervolemia, unspecified hypervolemia type  Probably iatrogenic, hard to " know for sure by phone  Try one week of demadex, risks discussed ; furosemide allergy (rash), so instructed to stop demedex and inform us if rash ensues   Short term follow up to make sure it worker   - torsemide (DEMADEX) 10 MG tablet; Take 1 tablet (10 mg) by mouth daily  Dispense: 7 tablet; Refill: 0    2. Pneumonia due to infectious organism, unspecified laterality, unspecified part of lung  Complete antibiotics as prescribed, he seems to be responding    3. Iron deficiency anemia, unspecified iron deficiency anemia type  If we can get his swelling improved to the point were he can wear shoes and come in, we can recheck iron stores and hemoglobin and refer for additional IV iron if needed; again we will follow up next week  I agree with xarelto to Eliquis switch, less risk for GI blood loss      Return in about 1 week (around 7/21/2020) for telephone follow up swelling .      Phone call duration:  15 minutes    Karson Bishop MD

## 2020-07-16 ENCOUNTER — TELEPHONE (OUTPATIENT)
Dept: FAMILY MEDICINE | Facility: CLINIC | Age: 85
End: 2020-07-16

## 2020-07-16 DIAGNOSIS — K90.89 POOR IRON ABSORPTION: ICD-10-CM

## 2020-07-16 DIAGNOSIS — D50.9 IRON DEFICIENCY ANEMIA, UNSPECIFIED IRON DEFICIENCY ANEMIA TYPE: Primary | ICD-10-CM

## 2020-07-16 NOTE — TELEPHONE ENCOUNTER
"TO PCP:     Per visit notes 7/14/2020     \"Iron deficiency anemia, unspecified iron deficiency anemia type  If we can get his swelling improved to the point were he can wear shoes and come in, we can recheck iron stores and hemoglobin and refer for additional IV iron if needed; again we will follow up next week  I agree with xarelto to Eliquis switch, less risk for GI blood loss\"     Called patient - states he is feeling better - shoes fitting better  He read over the discharge paperwork and wanted to make sure PCP addressed his iron deficiency  Does not want to come in this week, but thinks he could come in for lab check early next week     Please advise - should we schedule lab-only visit for early next week?   Pt would need lab orders placed    Thank you  Yamilex EVERETT RN      "

## 2020-07-16 NOTE — TELEPHONE ENCOUNTER
Reason for Call: Additional Discharge ordrs    Order or referral being requested: Additional orders from Dr Davis for Iron infusion that he forgot to mention during his visit on 7/14/2020    Date needed: as soon as possible    Has the patient been seen by the PCP for this problem? Not Applicable    Additional comments: Patient wonders if this is something he should still be planning to do, where would he go?    Phone number Patient can be reached at:  Home number on file [phone] (home)    Best Time:  any    Can we leave a detailed message on this number?  YES    Call taken on 7/16/2020 at 1:28 PM by Haydee Canales

## 2020-07-20 ENCOUNTER — TELEPHONE (OUTPATIENT)
Dept: FAMILY MEDICINE | Facility: CLINIC | Age: 85
End: 2020-07-20

## 2020-07-20 DIAGNOSIS — D50.9 IRON DEFICIENCY ANEMIA, UNSPECIFIED IRON DEFICIENCY ANEMIA TYPE: ICD-10-CM

## 2020-07-20 LAB — HGB BLD-MCNC: 9.8 G/DL (ref 13.3–17.7)

## 2020-07-20 PROCEDURE — 36415 COLL VENOUS BLD VENIPUNCTURE: CPT | Performed by: INTERNAL MEDICINE

## 2020-07-20 PROCEDURE — 83550 IRON BINDING TEST: CPT | Performed by: INTERNAL MEDICINE

## 2020-07-20 PROCEDURE — 85018 HEMOGLOBIN: CPT | Performed by: INTERNAL MEDICINE

## 2020-07-20 PROCEDURE — 83540 ASSAY OF IRON: CPT | Performed by: INTERNAL MEDICINE

## 2020-07-20 NOTE — TELEPHONE ENCOUNTER
,    Can you give the [facility] clinic an update on this pt? Is this pt off warfarin indefinitely? Shall we discharge him from the [facility] clinic? Please advise      Thank you

## 2020-07-21 ENCOUNTER — TELEPHONE (OUTPATIENT)
Dept: FAMILY MEDICINE | Facility: CLINIC | Age: 85
End: 2020-07-21

## 2020-07-21 DIAGNOSIS — K90.89 POOR IRON ABSORPTION: ICD-10-CM

## 2020-07-21 DIAGNOSIS — D50.9 IRON DEFICIENCY ANEMIA, UNSPECIFIED IRON DEFICIENCY ANEMIA TYPE: Primary | ICD-10-CM

## 2020-07-21 LAB
IRON SATN MFR SERPL: 6 % (ref 15–46)
IRON SERPL-MCNC: 22 UG/DL (ref 35–180)
TIBC SERPL-MCNC: 354 UG/DL (ref 240–430)

## 2020-07-21 RX ORDER — HEPARIN SODIUM (PORCINE) LOCK FLUSH IV SOLN 100 UNIT/ML 100 UNIT/ML
5 SOLUTION INTRAVENOUS
Status: CANCELLED | OUTPATIENT
Start: 2020-07-21

## 2020-07-21 RX ORDER — HEPARIN SODIUM,PORCINE 10 UNIT/ML
5 VIAL (ML) INTRAVENOUS
Status: CANCELLED | OUTPATIENT
Start: 2020-07-21

## 2020-07-21 NOTE — RESULT ENCOUNTER NOTE
The iron stores remain low  I would recommend an additional IV iron infusion   Can orders for this be placed and I will sign them  And can we contact [facility] and set up an appointment for him to receive the infusion      Also can we set up a hemoglobin and iron study  Lab appointment 1-2 week after infusion   Lab orders placed

## 2020-07-21 NOTE — TELEPHONE ENCOUNTER
To PCP:     Admit/Therapy plan is pended under Admit/Therapy Plan tab     Please notify Triage when signed.     Thank you,   Chrissy ALICEA RN          ----- Message from Karson Bishop MD sent at 7/21/2020  1:07 PM CDT -----  The iron stores remain low  I would recommend an additional IV iron infusion   Can orders for this be placed and I will sign them  And can we contact [facility] and set up an appointment for him to receive the infusion      Also can we set up a hemoglobin and iron study  Lab appointment 1-2 week after infusion   Lab orders placed

## 2020-07-21 NOTE — TELEPHONE ENCOUNTER
ANTICOAGULATION  MANAGEMENT    Hermilo Velasquez is being discharged from the [facility] Anticoagulation Management Program ([facility]).    Reason for discharge: warfarin replaced by alternate therapy, Camila    Anticoagulation episode resolved, [facility] referral closed and INR Standing order discontinued    If patient needs warfarin management in the future, please send a new referral

## 2020-07-22 ENCOUNTER — VIRTUAL VISIT (OUTPATIENT)
Dept: FAMILY MEDICINE | Facility: CLINIC | Age: 85
End: 2020-07-22
Payer: COMMERCIAL

## 2020-07-22 VITALS — DIASTOLIC BLOOD PRESSURE: 58 MMHG | SYSTOLIC BLOOD PRESSURE: 127 MMHG

## 2020-07-22 DIAGNOSIS — D50.9 IRON DEFICIENCY ANEMIA, UNSPECIFIED IRON DEFICIENCY ANEMIA TYPE: ICD-10-CM

## 2020-07-22 DIAGNOSIS — E87.70 HYPERVOLEMIA, UNSPECIFIED HYPERVOLEMIA TYPE: ICD-10-CM

## 2020-07-22 DIAGNOSIS — D50.9 IRON DEFICIENCY ANEMIA, UNSPECIFIED IRON DEFICIENCY ANEMIA TYPE: Primary | ICD-10-CM

## 2020-07-22 PROCEDURE — 99214 OFFICE O/P EST MOD 30 MIN: CPT | Mod: 95 | Performed by: INTERNAL MEDICINE

## 2020-07-22 RX ORDER — FERROUS SULFATE 325(65) MG
325 TABLET, DELAYED RELEASE (ENTERIC COATED) ORAL
Qty: 36 TABLET | Refills: 3 | Status: SHIPPED | OUTPATIENT
Start: 2020-07-22 | End: 2020-07-22

## 2020-07-22 RX ORDER — FERROUS SULFATE 325(65) MG
325 TABLET, DELAYED RELEASE (ENTERIC COATED) ORAL
Qty: 36 TABLET | Refills: 3 | Status: ON HOLD | OUTPATIENT
Start: 2020-07-22 | End: 2020-09-15

## 2020-07-22 NOTE — TELEPHONE ENCOUNTER
Catalina-     Are you willing to please sign the injectafer orders per PCP's message below?     Thank you,  Chrissy ALICEA RN

## 2020-07-22 NOTE — TELEPHONE ENCOUNTER
To PCP:     There really is no good way to print a therapy plan from [facility]    However, found the order for 750mg Ferric Carboxymaltose  (INJECTAFER) IV Piggy back in 115ml NS in database     This is pended    Please review/sign (or OK for DOD to sign in clinic) we will [facility] this [facility] and [facility]     They can then enter the therapy plan per their protocol, but will have the order from us     Please revise sig if Pt is to have 1 infusion of 750mg and then 7 days have a 2nd infusion of 750mg (recommended dosing for >50kg)       Please review,   Thank you,   Chrissy ALICEA RN

## 2020-07-22 NOTE — TELEPHONE ENCOUNTER
Spoke to patient - provided [facility] fax # for orders.    [facility]  Fax# [phone]  Provider: MD Keisha Wood MA on 7/22/2020 at 2:22 PM

## 2020-07-22 NOTE — TELEPHONE ENCOUNTER
Pt is going to check on copay, as [facility].  Pt will  Let us know this afternoon if he needs [facility] faxed to [facility].  Dione Mcgregor RN

## 2020-07-22 NOTE — PROGRESS NOTES
"Hemrilo Velasquez is a 87 year old male who is being evaluated via a billable telephone visit.      The patient has been notified of following:     \"This telephone visit will be conducted via a call between you and your physician/provider. We have found that certain health care needs can be provided without the need for a physical exam.  This service lets us provide the care you need with a short phone conversation.  If a prescription is necessary we can send it directly to your pharmacy.  If lab work is needed we can place an order for that and you can then stop by our lab to have the test done at a later time.    Telephone visits are billed at different rates depending on your insurance coverage. During this emergency period, for some insurers they may be billed the same as an in-person visit.  Please reach out to your insurance provider with any questions.    If during the course of the call the physician/provider feels a telephone visit is not appropriate, you will not be charged for this service.\"    Patient has given verbal consent for Telephone visit?  Yes    What phone number would you like to be contacted at? [phone]    How would you like to obtain your AVS? Mail a copy    Subjective     Hermilo Velasquez is a 87 year old male who presents via phone visit today for the following health issues:    HPI    Chief Complaint   Patient presents with     Follow Up     Edema             CC:  Follow up edema  We scheduled telephone follow up for his leg edema that I presumed was from IVFs from when he was hospitalized in [place] for GI  Bleeding  His swelling is better after taking 7 days of torsemide  He used up all the pills  He had many questions about his iron infusions  I think he is concerned about the $20 copay if he has the infusion at [facility]  [facility] the [facility]  He denies blood in stools or melena      Patient Active Problem List   Diagnosis     Ventral hernia     " Nonrheumatic aortic valve stenosis     GERD (gastroesophageal reflux disease)     Non Hodgkin's lymphoma (H): 2013     Microscopic hematuria     Health Care Home     CARDIOVASCULAR SCREENING; LDL GOAL LESS THAN 130     History of colonic polyps     Neuropathy     Anemia due to blood loss, acute     Physical deconditioning     Paroxysmal atrial fibrillation (H)     Need for SBE (subacute bacterial endocarditis) prophylaxis     RBBB (right bundle branch block)     Gastrointestinal hemorrhage with melena     Primary osteoarthritis of both knees     Primary osteoarthritis of left hip     Pulmonary embolism, bilateral (H)     S/P IVC filter     Long-term (current) use of anticoagulants [Z79.01]     Benign neoplasm of colon, unspecified part of colon     Pulmonary nodules     Benign neoplasm of colon     Primary osteoarthritis of left knee     Essential hypertension with goal blood pressure less than 140/90     Non-Hodgkin's lymphoma, unspecified body region, unspecified non-Hodgkin lymphoma type (H)     Anticoagulated on Coumadin     Other chronic pulmonary embolism without acute cor pulmonale (H)     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     Aftercare following left knee joint replacement surgery     Drainage from wound: left Knee     Lower extremity edema     Leg edema, left     S/P total knee arthroplasty     ACP (advance care planning)     Hyperlipidemia LDL goal <130     Chronic obstructive pulmonary disease, unspecified COPD type (H)     Iron deficiency anemia due to chronic blood loss     Spongiotic dermatitis     Nodule of lower lobe of right lung     Rotator cuff tendinitis, left     S/P rotator cuff surgery     CAP (community acquired pneumonia)     Malignant neoplasm of lung, unspecified laterality, unspecified part of lung (H)     Acute respiratory failure with hypoxia (H)     Hematemesis     Past Surgical History:   Procedure Laterality Date     APPENDECTOMY       AS TOTAL KNEE ARTHROPLASTY        BACK SURGERY       ESOPHAGOSCOPY, GASTROSCOPY, DUODENOSCOPY (EGD), COMBINED N/A 2015    Procedure: COMBINED ESOPHAGOSCOPY, GASTROSCOPY, DUODENOSCOPY (EGD);  Surgeon: Danis Castillo MD;  Location:  GI     ESOPHAGOSCOPY, GASTROSCOPY, DUODENOSCOPY (EGD), COMBINED N/A 2018    Procedure: COMBINED ESOPHAGOSCOPY, GASTROSCOPY, DUODENOSCOPY (EGD);;  Surgeon: Toby Dong DO;  Location:  GI     HERNIA REPAIR  2006     HERNIORRHAPHY VENTRAL  2013    Procedure: HERNIORRHAPHY VENTRAL;  VENTRAL HERNIA REPAIR WITH MESH;  Surgeon: Patel Guzman MD;  Location:  OR     KNEE SURGERY      arthroscopic right knee surgery      LOBECTOMY LUNG Right 3/26/2019    Procedure: POSSIBLE LOBECTOMY LUNG;  Surgeon: Jose A Vasques MD;  Location:  OR     REPAIR LIGAMENT ANKLE  2012    Procedure:REPAIR LIGAMENT ANKLE; LEFT TARSAL TUNNEL RELEASE OF KNOT OF ARIEL RELEASE; Surgeon:SAUL PUENTE; Location: OR     REPLACE VALVE AORTIC N/A 9/3/2015    Procedure: REPLACE VALVE AORTIC;  Surgeon: Antonino Mitchell MD;  Location:  OR     ROTATOR CUFF REPAIR RT/LT      bilateral     SPINE SURGERY      3 spine surgeries     THORACOTOMY, WEDGE RESECTION LUNG, COMBINED Right 3/26/2019    Procedure: RIGHT THORACOTOMY, WEDGE RESECTION, RIGHT LOWER LOBE LUNG NODULE,;  Surgeon: Jose A Vasques MD;  Location:  OR     TONSILLECTOMY       TURP         Social History     Tobacco Use     Smoking status: Former Smoker     Packs/day: 2.00     Years: 55.00     Pack years: 110.00     Last attempt to quit: 1998     Years since quittin.5     Smokeless tobacco: Never Used   Substance Use Topics     Alcohol use: Yes     Alcohol/week: 0.0 standard drinks     Comment: 1 drink per day     Family History   Problem Relation Age of Onset     C.A.D. Father      Emphysema Father      Melanoma No family hx of      Skin Cancer No family hx of          Current Outpatient Medications    Medication Sig Dispense Refill     acetaminophen (TYLENOL) 500 MG tablet Take 2 tablets by mouth every 4 hours       albuterol (PROAIR HFA/PROVENTIL HFA/VENTOLIN HFA) 108 (90 Base) MCG/ACT Inhaler Inhale 1-2 puffs into the lungs every 6 hours as needed for shortness of breath / dyspnea or wheezing 3 Inhaler 5     apixaban ANTICOAGULANT (ELIQUIS ANTICOAGULANT) 5 MG tablet 20 mg daily       apixaban ANTICOAGULANT (ELIQUIS ANTICOAGULANT) 5 MG tablet Take 1 tablet (5 mg) by mouth 2 times daily 60 tablet 11     atorvastatin (LIPITOR) 10 MG tablet Take 1 tablet (10 mg) by mouth daily 90 tablet 3     cefdinir (OMNICEF) 300 MG capsule Take 1 capsule by mouth every 12 hours for 7 days. Indications: a lower respiratory infection       famotidine (PEPCID) 40 MG tablet Take 1 tablet (40 mg) by mouth 2 times daily as needed (heartburn) 180 tablet 1     ferrous sulfate (FE TABS) 325 (65 Fe) MG EC tablet Take 1 tablet (325 mg) by mouth three times a week 36 tablet 3     gabapentin (NEURONTIN) 300 MG capsule Take 300 mg by mouth every morning       gabapentin (NEURONTIN) 300 MG capsule Take 900 mg by mouth At Bedtime       HYDROcodone-acetaminophen (NORCO) 5-325 MG tablet Take 1 tablet by mouth every 6 hours as needed for pain 18 tablet 0     hydrOXYzine (ATARAX) 25 MG tablet Take 2 tablets (50 mg) by mouth every 6 hours as needed 360 tablet 3     loratadine (CLARITIN) 10 MG tablet Take 10 mg by mouth daily as needed for allergies        metoprolol succinate ER (TOPROL-XL) 25 MG 24 hr tablet Take 0.5 tablets (12.5 mg) by mouth daily 30 tablet 0     omeprazole (PRILOSEC) 20 MG DR capsule Take 1 capsule by mouth every 24 hours       order for DME Equipment being ordered: Right neoprene knee brace.  ( Fax to [facility] fax  [phone]) 1 each 0     pantoprazole (PROTONIX) 40 MG EC tablet Take 1 tablet (40 mg) by mouth 2 times daily (before meals) 60 tablet 1     rivaroxaban ANTICOAGULANT (XARELTO ANTICOAGULANT) 20 MG TABS  "tablet Take 20 mg by mouth       tamsulosin (FLOMAX) 0.4 MG capsule Take 0.4 mg by mouth daily       tiotropium (SPIRIVA HANDIHALER) 18 MCG capsule Inhale 1 capsule (18 mcg) into the lungs daily 90 capsule 3     triamcinolone (KENALOG) 0.1 % external cream Apply topically 2 times daily as needed for irritation 453.6 g 3     Allergies   Allergen Reactions     Lidocaine Blisters     Allergy to lidocaine ointment     Omeprazole Itching     Pantoprazole Itching     Prevacid [Lansoprazole] Itching     Lasix [Furosemide] Rash     Penicillins Rash     \"broke out from injection\" 60 yrs ago         Reviewed and updated as needed this visit by Provider         Review of Systems   Constitutional, HEENT, cardiovascular, pulmonary, gi and gu systems are negative, except as otherwise noted.       Objective   Reported vitals:  /58    healthy, alert and no distress  PSYCH: Alert and oriented times 3; coherent speech, normal   rate and volume, able to articulate logical thoughts, able   to abstract reason, no tangential thoughts, no hallucinations   or delusions  His affect is normal  RESP: No cough, no audible wheezing, able to talk in full sentences  Remainder of exam unable to be completed due to telephone visits    Diagnostic Test Results:  Labs reviewed in Epic        Assessment/Plan:    1. Iron deficiency anemia, unspecified iron deficiency anemia type  After a lengthy discussion, we finally decided to try a trial of oral iron rather than proceeding straight to IV iron due to concerns over costs as stated above  We discussed that oral iron can cause constipation and nausea  We will try it  He [facility]  We will arrange for follow up iron and hemoglobin testing in 2 months   - ferrous sulfate (FE TABS) 325 (65 Fe) MG EC tablet; Take 1 tablet (325 mg) by mouth three times a week  Dispense: 36 tablet; Refill: 3    2. Hypervolemia, unspecified hypervolemia type  Edema improved " after short term course of oral torsemide       Return in about 2 months (around 9/22/2020) for telephone appt with Dr. Bishop 2-3 days after non-fasting lab appt to recheck iron studies and hgb.      Phone call duration:  21 minutes    Karson Bishop MD

## 2020-07-22 NOTE — TELEPHONE ENCOUNTER
It is ok for DOD to sign  I am not sure this will work  [facility] can provide [facility], but I am not sure about infusions  Chapito is worried about the $20 copay for the infusions  We discussed oral iron as an alternative if the [facility] cannot provide the infusions

## 2020-07-24 NOTE — TELEPHONE ENCOUNTER
Karson Bishop MD  P [facility] Triage               See note, cancel IV iron infusion, we are trying oral iron      Cancelled Iron infusion in chart    Yamilex EVERETT RN

## 2020-08-11 ENCOUNTER — OFFICE VISIT (OUTPATIENT)
Dept: FAMILY MEDICINE | Facility: CLINIC | Age: 85
End: 2020-08-11
Payer: COMMERCIAL

## 2020-08-11 ENCOUNTER — TELEPHONE (OUTPATIENT)
Dept: OTHER | Facility: CLINIC | Age: 85
End: 2020-08-11

## 2020-08-11 VITALS
HEIGHT: 70 IN | BODY MASS INDEX: 25.01 KG/M2 | OXYGEN SATURATION: 96 % | TEMPERATURE: 97.1 F | DIASTOLIC BLOOD PRESSURE: 50 MMHG | WEIGHT: 174.7 LBS | SYSTOLIC BLOOD PRESSURE: 97 MMHG | HEART RATE: 74 BPM

## 2020-08-11 DIAGNOSIS — K21.9 GASTROESOPHAGEAL REFLUX DISEASE WITHOUT ESOPHAGITIS: ICD-10-CM

## 2020-08-11 DIAGNOSIS — I26.99 PULMONARY EMBOLISM, BILATERAL (H): Primary | ICD-10-CM

## 2020-08-11 DIAGNOSIS — I48.0 PAROXYSMAL ATRIAL FIBRILLATION (H): ICD-10-CM

## 2020-08-11 DIAGNOSIS — J44.9 CHRONIC OBSTRUCTIVE PULMONARY DISEASE, UNSPECIFIED COPD TYPE (H): ICD-10-CM

## 2020-08-11 DIAGNOSIS — C85.90 NON-HODGKIN'S LYMPHOMA, UNSPECIFIED BODY REGION, UNSPECIFIED NON-HODGKIN LYMPHOMA TYPE (H): ICD-10-CM

## 2020-08-11 DIAGNOSIS — D50.0 IRON DEFICIENCY ANEMIA DUE TO CHRONIC BLOOD LOSS: ICD-10-CM

## 2020-08-11 DIAGNOSIS — I50.33 ACUTE ON CHRONIC DIASTOLIC CONGESTIVE HEART FAILURE (H): ICD-10-CM

## 2020-08-11 DIAGNOSIS — Z95.828 S/P IVC FILTER: ICD-10-CM

## 2020-08-11 DIAGNOSIS — M17.11 PRIMARY OSTEOARTHRITIS OF RIGHT KNEE: ICD-10-CM

## 2020-08-11 DIAGNOSIS — I87.8 VENOUS STASIS: ICD-10-CM

## 2020-08-11 DIAGNOSIS — Z96.652 STATUS POST TOTAL LEFT KNEE REPLACEMENT: ICD-10-CM

## 2020-08-11 DIAGNOSIS — C34.90 MALIGNANT NEOPLASM OF LUNG, UNSPECIFIED LATERALITY, UNSPECIFIED PART OF LUNG (H): ICD-10-CM

## 2020-08-11 LAB
ERYTHROCYTE [DISTWIDTH] IN BLOOD BY AUTOMATED COUNT: 17.3 % (ref 10–15)
HCT VFR BLD AUTO: 24.7 % (ref 40–53)
HGB BLD-MCNC: 7.4 G/DL (ref 13.3–17.7)
MCH RBC QN AUTO: 24.8 PG (ref 26.5–33)
MCHC RBC AUTO-ENTMCNC: 30 G/DL (ref 31.5–36.5)
MCV RBC AUTO: 83 FL (ref 78–100)
NT-PROBNP SERPL-MCNC: 1593 PG/ML (ref 0–450)
PLATELET # BLD AUTO: 255 10E9/L (ref 150–450)
RBC # BLD AUTO: 2.98 10E12/L (ref 4.4–5.9)
WBC # BLD AUTO: 7.5 10E9/L (ref 4–11)

## 2020-08-11 PROCEDURE — 99214 OFFICE O/P EST MOD 30 MIN: CPT | Mod: 25 | Performed by: INTERNAL MEDICINE

## 2020-08-11 PROCEDURE — 83550 IRON BINDING TEST: CPT | Performed by: INTERNAL MEDICINE

## 2020-08-11 PROCEDURE — 85027 COMPLETE CBC AUTOMATED: CPT | Performed by: INTERNAL MEDICINE

## 2020-08-11 PROCEDURE — 83880 ASSAY OF NATRIURETIC PEPTIDE: CPT | Performed by: INTERNAL MEDICINE

## 2020-08-11 PROCEDURE — 36415 COLL VENOUS BLD VENIPUNCTURE: CPT | Performed by: INTERNAL MEDICINE

## 2020-08-11 PROCEDURE — 83540 ASSAY OF IRON: CPT | Performed by: INTERNAL MEDICINE

## 2020-08-11 PROCEDURE — 20610 DRAIN/INJ JOINT/BURSA W/O US: CPT | Performed by: INTERNAL MEDICINE

## 2020-08-11 PROCEDURE — 80048 BASIC METABOLIC PNL TOTAL CA: CPT | Performed by: INTERNAL MEDICINE

## 2020-08-11 RX ORDER — TORSEMIDE 20 MG/1
20 TABLET ORAL DAILY
Qty: 30 TABLET | Refills: 3 | Status: ON HOLD | OUTPATIENT
Start: 2020-08-11 | End: 2020-08-15

## 2020-08-11 RX ORDER — FAMOTIDINE 40 MG/1
40 TABLET, FILM COATED ORAL 2 TIMES DAILY
Qty: 60 TABLET | Refills: 1 | Status: SHIPPED | OUTPATIENT
Start: 2020-08-11 | End: 2020-09-18

## 2020-08-11 ASSESSMENT — MIFFLIN-ST. JEOR: SCORE: 1473.68

## 2020-08-11 NOTE — PROGRESS NOTES
Subjective     Hermilo Velasquez is a 87 year old male who presents to clinic today for the following health issues:    HPI       Musculoskeletal problem/pain      Duration: 2 weeks on and off    Description  Location: bilateral feet=    Intensity:  mild    Accompanying signs and symptoms: numbness, warmth and swelling    History  Previous similar problem: YES  Previous evaluation:  none    Precipitating or alleviating factors:  Trauma or overuse: no   Aggravating factors include: walking    Therapies tried and outcome: heat, ice and massage    Patient was in his usual state of health managing his COPD until approximately 1 month ago when he was hospitalized at the [facility] for upper GI bleeding.  His routine Coumadin therapy was discontinued.  His upper GI bleeding was reportedly stabilized and as he was discharged his hemoglobin was 9.8.  He was bridged with Lovenox and started on Eliquis.  History of pulmonary embolus on 618.    After his hospitalization he was treated with iron infusion and has been having noted increased leg swelling that was improved with Demadex for 1 week however the swelling has returned and he presents for reevaluation.    His medications were reviewed with?'s and therapy especially for his GI bleeding prophylaxis.    Ongoing pain in his right knee treated with a knee brace and a cane.  Patient complains of ongoing associated pain with subsequent instability.  He notes that in the past.  Injections by Dr. Bishop have been helpful    Current Outpatient Medications   Medication Sig Dispense Refill     acetaminophen (TYLENOL) 500 MG tablet Take 2 tablets by mouth every 4 hours       albuterol (PROAIR HFA/PROVENTIL HFA/VENTOLIN HFA) 108 (90 Base) MCG/ACT Inhaler Inhale 1-2 puffs into the lungs every 6 hours as needed for shortness of breath / dyspnea or wheezing 3 Inhaler 5     apixaban ANTICOAGULANT (ELIQUIS ANTICOAGULANT) 5 MG tablet 20 mg daily       apixaban ANTICOAGULANT (ELIQUIS  ANTICOAGULANT) 5 MG tablet Take 1 tablet (5 mg) by mouth 2 times daily 60 tablet 11     atorvastatin (LIPITOR) 10 MG tablet Take 1 tablet (10 mg) by mouth daily 90 tablet 3     cefdinir (OMNICEF) 300 MG capsule Take 1 capsule by mouth every 12 hours for 7 days. Indications: a lower respiratory infection       famotidine (PEPCID) 40 MG tablet Take 1 tablet (40 mg) by mouth 2 times daily 60 tablet 1     famotidine (PEPCID) 40 MG tablet Take 1 tablet (40 mg) by mouth 2 times daily as needed (heartburn) 180 tablet 1     ferrous sulfate (FE TABS) 325 (65 Fe) MG EC tablet Take 1 tablet (325 mg) by mouth three times a week 36 tablet 3     gabapentin (NEURONTIN) 300 MG capsule Take 300 mg by mouth every morning       gabapentin (NEURONTIN) 300 MG capsule Take 900 mg by mouth At Bedtime       HYDROcodone-acetaminophen (NORCO) 5-325 MG tablet Take 1 tablet by mouth every 6 hours as needed for pain 18 tablet 0     hydrOXYzine (ATARAX) 25 MG tablet Take 2 tablets (50 mg) by mouth every 6 hours as needed 360 tablet 3     loratadine (CLARITIN) 10 MG tablet Take 10 mg by mouth daily as needed for allergies        metoprolol succinate ER (TOPROL-XL) 25 MG 24 hr tablet Take 0.5 tablets (12.5 mg) by mouth daily 30 tablet 0     omeprazole (PRILOSEC) 20 MG DR capsule Take 1 capsule by mouth every 24 hours       order for DME Equipment being ordered: Right neoprene knee brace.  ( Fax to [facility] fax  [phone]) 1 each 0     pantoprazole (PROTONIX) 40 MG EC tablet Take 1 tablet (40 mg) by mouth 2 times daily (before meals) 60 tablet 1     rivaroxaban ANTICOAGULANT (XARELTO ANTICOAGULANT) 20 MG TABS tablet Take 20 mg by mouth       tamsulosin (FLOMAX) 0.4 MG capsule Take 0.4 mg by mouth daily       tiotropium (SPIRIVA HANDIHALER) 18 MCG capsule Inhale 1 capsule (18 mcg) into the lungs daily 90 capsule 3     torsemide (DEMADEX) 20 MG tablet Take 1 tablet (20 mg) by mouth daily 30 tablet 3     triamcinolone (KENALOG) 0.1 % external  "cream Apply topically 2 times daily as needed for irritation 453.6 g 3     Allergies   Allergen Reactions     Lidocaine Blisters     Allergy to lidocaine ointment     Omeprazole Itching     Pantoprazole Itching     Prevacid [Lansoprazole] Itching     Lasix [Furosemide] Rash     Penicillins Rash     \"broke out from injection\" 60 yrs ago         Reviewed and updated as needed this visit by Provider         Review of Systems   Constitutional, HEENT, cardiovascular, pulmonary, GI, , musculoskeletal, neuro, skin, endocrine and psych systems are negative, except as otherwise noted.      Objective    There were no vitals taken for this visit.  There is no height or weight on file to calculate BMI.  Physical Exam BP 97/50 (BP Location: Right arm, Patient Position: Sitting, Cuff Size: Adult Regular)   Pulse 74   Temp 97.1  F (36.2  C) (Temporal)   Ht 1.778 m (5' 10\")   Wt 79.2 kg (174 lb 11.2 oz)   SpO2 96%   BMI 25.07 kg/m      GENERAL: healthy, alert and no distress  NECK: no adenopathy, no asymmetry, masses, or scars and thyroid normal to palpation  RESP: lungs clear to auscultation, faint expiratory wheezing in the supine position- no rales, rhonchi   CV: regular rate and rhythm, normal S1 S2, no S3 or S4, no murmur, click or rub, no peripheral edema and peripheral pulses strong  ABDOMEN: soft, nontender, no hepatosplenomegaly, no masses and bowel sounds normal  MS: no gross musculoskeletal defects noted, left knee replacement, right knee with brace in place minor knee effusion with no significant Baker's cyst, 2+ pretibial edema on the right 1+ on the left no palpable cords or tenderness  Positive HJR    Procedure right knee:  The lateral superior landmarks were marked on his skin the skin was prepped with Betadine and alcohol and a 21-gauge 1-1/2 inch needle was inserted into the knee joint without difficulty.  Synovial fluid was present but there was no significant fluid extracted and Medrol 60 mg with " "lidocaine and Marcaine were injected without difficulty.        Assessment & Plan     1. Pulmonary embolism, bilateral (H)  Continue Eliquis    2. Paroxysmal atrial fibrillation (H)  Rate controlled on chronic Eliquis    3. Non-Hodgkin's lymphoma, unspecified body region, unspecified non-Hodgkin lymphoma type (H)      4. Chronic obstructive pulmonary disease, unspecified COPD type (H)      5. Malignant neoplasm of lung, unspecified laterality, unspecified part of lung (H)      6. S/P IVC filter      7. 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 Iron deficiency anemia due to chronic blood loss    - CBC with platelets  - Basic metabolic panel  - Iron and iron binding capacity  - famotidine (PEPCID) 40 MG tablet; Take 1 tablet (40 mg) by mouth 2 times daily  Dispense: 60 tablet; Refill: 1    9. Gastroesophageal reflux disease without esophagitis  No ongoing symptoms    10. Venous stasis  Resume torsemide and evaluate for right-sided heart failure versus ongoing venous stasis with a history of recent pulmonary embolus  - torsemide (DEMADEX) 20 MG tablet; Take 1 tablet (20 mg) by mouth daily  Dispense: 30 tablet; Refill: 3    11. Acute on chronic diastolic congestive heart failure (H)  As noted above  - BNP-N terminal pro     BMI:   Estimated body mass index is 25.07 kg/m  as calculated from the following:    Height as of this encounter: 1.778 m (5' 10\").    Weight as of this encounter: 79.2 kg (174 lb 11.2 oz).           FUTURE APPOINTMENTS:       - Follow-up visit in 1  wk    No follow-ups on file.    Willy Harvey MD  [facility]  Addendum: Lab reports returned with his hemoglobin at 7.4 down from 9.8 at discharge from recent hospitalization.  The patient was called at home to we review his medications.  He reportedly is taking his Protonix twice daily as recommended and it is unclear at this time but if he is taking his Pepcid as well.    Patient will call back to review his medications and " the possible need for a Pepcid prescription.  Depending on his condition will be seen either later this week and definitely next week.

## 2020-08-11 NOTE — LETTER
August 13, 2020      Hermilo Velasquez  [address] [place] [facility] [address]        Dear ,    We are writing to inform you of your test results.    Susie     Enclosed your lab reports from your recent office exam.     The BNP test is a test to help us to evaluate the fluids in your system.  It tells us that the fluid retention and swelling that you have in your lower legs is not related to congestive heart failure which is good news.   The basic metabolic panel showed that your minerals and electrolytes and kidney function tests were all normal.     As we had discussed your CBC showed your hemoglobin was lower than it was when you were discharged from the hospital.  Associated with this your iron stores are low and it looks like you need another iron infusion.  I will arrange for this next week after I see you on Tuesday.     Resulted Orders   CBC with platelets   Result Value Ref Range    WBC 7.5 4.0 - 11.0 10e9/L    RBC Count 2.98 (L) 4.4 - 5.9 10e12/L    Hemoglobin 7.4 (LL) 13.3 - 17.7 g/dL      Comment:      Critical Value called to and read back by  GIVEN TO BRUCE AT 1155 BY ARM ON 8.11.2020  Results confirmed by repeat test      Hematocrit 24.7 (L) 40.0 - 53.0 %    MCV 83 78 - 100 fl    MCH 24.8 (L) 26.5 - 33.0 pg    MCHC 30.0 (L) 31.5 - 36.5 g/dL    RDW 17.3 (H) 10.0 - 15.0 %    Platelet Count 255 150 - 450 10e9/L   Basic metabolic panel   Result Value Ref Range    Sodium 140 133 - 144 mmol/L    Potassium 4.4 3.4 - 5.3 mmol/L    Chloride 108 94 - 109 mmol/L    Carbon Dioxide 24 20 - 32 mmol/L    Anion Gap 8 3 - 14 mmol/L    Glucose 98 70 - 99 mg/dL    Urea Nitrogen 18 7 - 30 mg/dL    Creatinine 1.17 0.66 - 1.25 mg/dL    GFR Estimate 55 (L) >60 mL/min/[1.73_m2]      Comment:      Non  GFR Calc  Starting 12/18/2018, serum creatinine based estimated GFR (eGFR) will be   calculated using the Chronic Kidney Disease Epidemiology Collaboration   (CKD-EPI) equation.      GFR  Estimate If Black 64 >60 mL/min/[1.73_m2]      Comment:       GFR Calc  Starting 12/18/2018, serum creatinine based estimated GFR (eGFR) will be   calculated using the Chronic Kidney Disease Epidemiology Collaboration   (CKD-EPI) equation.      Calcium 8.7 8.5 - 10.1 mg/dL   Iron and iron binding capacity   Result Value Ref Range    Iron 16 (L) 35 - 180 ug/dL    Iron Binding Cap 322 240 - 430 ug/dL    Iron Saturation Index 5 (L) 15 - 46 %   BNP-N terminal pro   Result Value Ref Range    N-Terminal Pro Bnp 1,593 (H) 0 - 450 pg/mL      Comment:         Reference range shown and results flagged as abnormal are for the outpatient,   non acute settings. Establishing a baseline value for each individual patient   is useful for follow-up.  Suggested inpatient cut points for confirming diagnosis of CHF in an acute   setting are:   >450 pg/mL (age 18 to less than 50)   >900 pg/mL (age 50 to less than 75)   >1800 pg/mL (75 yrs and older)  An inpatient or emergency department NT-proPBNP <300 pg/mL effectively rules   out acute CHF, with 99% negative predictive value.          If you have any questions or concerns, please call the clinic at the number listed above.       Sincerely,        Willy Harvey MD

## 2020-08-12 LAB
ANION GAP SERPL CALCULATED.3IONS-SCNC: 8 MMOL/L (ref 3–14)
BUN SERPL-MCNC: 18 MG/DL (ref 7–30)
CALCIUM SERPL-MCNC: 8.7 MG/DL (ref 8.5–10.1)
CHLORIDE SERPL-SCNC: 108 MMOL/L (ref 94–109)
CO2 SERPL-SCNC: 24 MMOL/L (ref 20–32)
CREAT SERPL-MCNC: 1.17 MG/DL (ref 0.66–1.25)
GFR SERPL CREATININE-BSD FRML MDRD: 55 ML/MIN/{1.73_M2}
GLUCOSE SERPL-MCNC: 98 MG/DL (ref 70–99)
IRON SATN MFR SERPL: 5 % (ref 15–46)
IRON SERPL-MCNC: 16 UG/DL (ref 35–180)
POTASSIUM SERPL-SCNC: 4.4 MMOL/L (ref 3.4–5.3)
SODIUM SERPL-SCNC: 140 MMOL/L (ref 133–144)
TIBC SERPL-MCNC: 322 UG/DL (ref 240–430)

## 2020-08-13 ENCOUNTER — TELEPHONE (OUTPATIENT)
Dept: FAMILY MEDICINE | Facility: CLINIC | Age: 85
End: 2020-08-13

## 2020-08-13 RX ORDER — HEPARIN SODIUM (PORCINE) LOCK FLUSH IV SOLN 100 UNIT/ML 100 UNIT/ML
5 SOLUTION INTRAVENOUS
Status: CANCELLED | OUTPATIENT
Start: 2020-08-19

## 2020-08-13 RX ORDER — HEPARIN SODIUM,PORCINE 10 UNIT/ML
5 VIAL (ML) INTRAVENOUS
Status: CANCELLED | OUTPATIENT
Start: 2020-08-19

## 2020-08-13 NOTE — TELEPHONE ENCOUNTER
Verbal order .  Patient needs Injectafer infusion after 8/18/20.    PENDED therapy plan for  review and sign.  Notify RNs when signed; we will contact infusion center.   Thank you,  Analisa Vigil RN on 8/13/2020 at 8:16 AM      Recent Results (from the past 240 hour(s))   CBC with platelets    Collection Time: 08/11/20 11:41 AM   Result Value Ref Range    WBC 7.5 4.0 - 11.0 10e9/L    RBC Count 2.98 (L) 4.4 - 5.9 10e12/L    Hemoglobin 7.4 (LL) 13.3 - 17.7 g/dL    Hematocrit 24.7 (L) 40.0 - 53.0 %    MCV 83 78 - 100 fl    MCH 24.8 (L) 26.5 - 33.0 pg    MCHC 30.0 (L) 31.5 - 36.5 g/dL    RDW 17.3 (H) 10.0 - 15.0 %    Platelet Count 255 150 - 450 10e9/L   Basic metabolic panel    Collection Time: 08/11/20 11:41 AM   Result Value Ref Range    Sodium 140 133 - 144 mmol/L    Potassium 4.4 3.4 - 5.3 mmol/L    Chloride 108 94 - 109 mmol/L    Carbon Dioxide 24 20 - 32 mmol/L    Anion Gap 8 3 - 14 mmol/L    Glucose 98 70 - 99 mg/dL    Urea Nitrogen 18 7 - 30 mg/dL    Creatinine 1.17 0.66 - 1.25 mg/dL    GFR Estimate 55 (L) >60 mL/min/[1.73_m2]    GFR Estimate If Black 64 >60 mL/min/[1.73_m2]    Calcium 8.7 8.5 - 10.1 mg/dL   Iron and iron binding capacity    Collection Time: 08/11/20 11:41 AM   Result Value Ref Range    Iron 16 (L) 35 - 180 ug/dL    Iron Binding Cap 322 240 - 430 ug/dL    Iron Saturation Index 5 (L) 15 - 46 %   BNP-N terminal pro    Collection Time: 08/11/20 12:47 PM   Result Value Ref Range    N-Terminal Pro Bnp 1,593 (H) 0 - 450 pg/mL

## 2020-08-13 NOTE — TELEPHONE ENCOUNTER
PCP signed order - asked that infusion be scheduled after this coming Tuesday     Thank you  Yamilex EVERETT RN

## 2020-08-14 NOTE — TELEPHONE ENCOUNTER
Per review of [facility], infusion is scheduled 8/20/20.    See new encounter re: low BP.  aware and has responded in that encounter.    Analisa Vigil RN on 8/14/2020 at 1:10 PM

## 2020-08-15 ENCOUNTER — HOSPITAL ENCOUNTER (INPATIENT)
Facility: CLINIC | Age: 85
LOS: 3 days | Discharge: HOME | DRG: 811 | End: 2020-08-18
Attending: PHYSICIAN ASSISTANT | Admitting: STUDENT IN AN ORGANIZED HEALTH CARE EDUCATION/TRAINING PROGRAM
Payer: COMMERCIAL

## 2020-08-15 ENCOUNTER — NURSE TRIAGE (OUTPATIENT)
Dept: NURSING | Facility: CLINIC | Age: 85
End: 2020-08-15

## 2020-08-15 ENCOUNTER — APPOINTMENT (OUTPATIENT)
Dept: GENERAL RADIOLOGY | Facility: CLINIC | Age: 85
DRG: 811 | End: 2020-08-15
Attending: PHYSICIAN ASSISTANT
Payer: COMMERCIAL

## 2020-08-15 DIAGNOSIS — R19.5 OCCULT BLOOD POSITIVE STOOL: ICD-10-CM

## 2020-08-15 DIAGNOSIS — R53.1 GENERALIZED WEAKNESS: ICD-10-CM

## 2020-08-15 DIAGNOSIS — D64.9 ANEMIA: ICD-10-CM

## 2020-08-15 PROBLEM — Z95.4 HISTORY OF AORTIC VALVE REPLACEMENT WITH TISSUE GRAFT: Status: ACTIVE | Noted: 2020-08-15

## 2020-08-15 PROBLEM — J43.2 CENTRILOBULAR EMPHYSEMA (H): Status: ACTIVE | Noted: 2020-07-07

## 2020-08-15 PROBLEM — J18.9 CAP (COMMUNITY ACQUIRED PNEUMONIA): Status: RESOLVED | Noted: 2019-12-22 | Resolved: 2020-08-15

## 2020-08-15 PROBLEM — L12.0 BULLOUS PEMPHIGOID (H): Status: ACTIVE | Noted: 2017-05-22

## 2020-08-15 PROBLEM — K92.0 HEMATEMESIS: Status: RESOLVED | Noted: 2020-03-22 | Resolved: 2020-08-15

## 2020-08-15 PROBLEM — M25.519 SHOULDER JOINT PAIN: Status: ACTIVE | Noted: 2020-08-15

## 2020-08-15 PROBLEM — D50.0 IRON DEFICIENCY ANEMIA DUE TO CHRONIC BLOOD LOSS: Status: ACTIVE | Noted: 2018-08-30

## 2020-08-15 PROBLEM — J96.01 ACUTE RESPIRATORY FAILURE WITH HYPOXIA (H): Status: RESOLVED | Noted: 2020-01-13 | Resolved: 2020-08-15

## 2020-08-15 PROBLEM — C34.91: Status: ACTIVE | Noted: 2020-08-15

## 2020-08-15 PROBLEM — Z85.72 HISTORY OF NON-HODGKIN'S LYMPHOMA: Status: ACTIVE | Noted: 2020-08-15

## 2020-08-15 LAB
ABO + RH BLD: NORMAL
ABO + RH BLD: NORMAL
ALBUMIN SERPL-MCNC: 2.9 G/DL (ref 3.4–5)
ALP SERPL-CCNC: 100 U/L (ref 40–150)
ALT SERPL W P-5'-P-CCNC: 10 U/L (ref 0–70)
ANION GAP SERPL CALCULATED.3IONS-SCNC: 4 MMOL/L (ref 3–14)
AST SERPL W P-5'-P-CCNC: 7 U/L (ref 0–45)
BASOPHILS # BLD AUTO: 0 10E9/L (ref 0–0.2)
BASOPHILS NFR BLD AUTO: 0.1 %
BILIRUB SERPL-MCNC: 0.4 MG/DL (ref 0.2–1.3)
BLD GP AB SCN SERPL QL: NORMAL
BLD PROD TYP BPU: NORMAL
BLD PROD TYP BPU: NORMAL
BLD UNIT ID BPU: 0
BLOOD BANK CMNT PATIENT-IMP: NORMAL
BLOOD PRODUCT CODE: NORMAL
BPU ID: NORMAL
BUN SERPL-MCNC: 28 MG/DL (ref 7–30)
CALCIUM SERPL-MCNC: 8.2 MG/DL (ref 8.5–10.1)
CHLORIDE SERPL-SCNC: 101 MMOL/L (ref 94–109)
CO2 SERPL-SCNC: 34 MMOL/L (ref 20–32)
CREAT SERPL-MCNC: 1.71 MG/DL (ref 0.66–1.25)
DIFFERENTIAL METHOD BLD: ABNORMAL
EOSINOPHIL # BLD AUTO: 0.8 10E9/L (ref 0–0.7)
EOSINOPHIL NFR BLD AUTO: 9.1 %
ERYTHROCYTE [DISTWIDTH] IN BLOOD BY AUTOMATED COUNT: 17.1 % (ref 10–15)
GFR SERPL CREATININE-BSD FRML MDRD: 35 ML/MIN/{1.73_M2}
GLUCOSE SERPL-MCNC: 133 MG/DL (ref 70–99)
HCT VFR BLD AUTO: 24.9 % (ref 40–53)
HEMOCCULT STL QL: POSITIVE
HGB BLD-MCNC: 7.4 G/DL (ref 13.3–17.7)
IMM GRANULOCYTES # BLD: 0 10E9/L (ref 0–0.4)
IMM GRANULOCYTES NFR BLD: 0.2 %
INTERPRETATION ECG - MUSE: NORMAL
LACTATE BLD-SCNC: 1.2 MMOL/L (ref 0.7–2)
LYMPHOCYTES # BLD AUTO: 1.2 10E9/L (ref 0.8–5.3)
LYMPHOCYTES NFR BLD AUTO: 13.4 %
MCH RBC QN AUTO: 23.7 PG (ref 26.5–33)
MCHC RBC AUTO-ENTMCNC: 29.7 G/DL (ref 31.5–36.5)
MCV RBC AUTO: 80 FL (ref 78–100)
MONOCYTES # BLD AUTO: 1 10E9/L (ref 0–1.3)
MONOCYTES NFR BLD AUTO: 10.8 %
NEUTROPHILS # BLD AUTO: 6.1 10E9/L (ref 1.6–8.3)
NEUTROPHILS NFR BLD AUTO: 66.4 %
NUM BPU REQUESTED: 1
PLATELET # BLD AUTO: 294 10E9/L (ref 150–450)
POTASSIUM SERPL-SCNC: 3 MMOL/L (ref 3.4–5.3)
POTASSIUM SERPL-SCNC: 3.4 MMOL/L (ref 3.4–5.3)
PROT SERPL-MCNC: 6.7 G/DL (ref 6.8–8.8)
RBC # BLD AUTO: 3.12 10E12/L (ref 4.4–5.9)
SODIUM SERPL-SCNC: 139 MMOL/L (ref 133–144)
SPECIMEN EXP DATE BLD: NORMAL
TRANSFUSION STATUS PATIENT QL: NORMAL
TRANSFUSION STATUS PATIENT QL: NORMAL
TROPONIN I SERPL-MCNC: <0.015 UG/L (ref 0–0.04)
WBC # BLD AUTO: 9.2 10E9/L (ref 4–11)

## 2020-08-15 PROCEDURE — 86901 BLOOD TYPING SEROLOGIC RH(D): CPT | Performed by: PHYSICIAN ASSISTANT

## 2020-08-15 PROCEDURE — 96374 THER/PROPH/DIAG INJ IV PUSH: CPT

## 2020-08-15 PROCEDURE — 86850 RBC ANTIBODY SCREEN: CPT | Performed by: PHYSICIAN ASSISTANT

## 2020-08-15 PROCEDURE — 99285 EMERGENCY DEPT VISIT HI MDM: CPT | Mod: 25

## 2020-08-15 PROCEDURE — P9016 RBC LEUKOCYTES REDUCED: HCPCS | Performed by: PHYSICIAN ASSISTANT

## 2020-08-15 PROCEDURE — 99223 1ST HOSP IP/OBS HIGH 75: CPT | Mod: AI | Performed by: STUDENT IN AN ORGANIZED HEALTH CARE EDUCATION/TRAINING PROGRAM

## 2020-08-15 PROCEDURE — U0003 INFECTIOUS AGENT DETECTION BY NUCLEIC ACID (DNA OR RNA); SEVERE ACUTE RESPIRATORY SYNDROME CORONAVIRUS 2 (SARS-COV-2) (CORONAVIRUS DISEASE [COVID-19]), AMPLIFIED PROBE TECHNIQUE, MAKING USE OF HIGH THROUGHPUT TECHNOLOGIES AS DESCRIBED BY CMS-2020-01-R: HCPCS | Performed by: PHYSICIAN ASSISTANT

## 2020-08-15 PROCEDURE — 83605 ASSAY OF LACTIC ACID: CPT | Performed by: PHYSICIAN ASSISTANT

## 2020-08-15 PROCEDURE — 25800030 ZZH RX IP 258 OP 636: Performed by: PHYSICIAN ASSISTANT

## 2020-08-15 PROCEDURE — C9113 INJ PANTOPRAZOLE SODIUM, VIA: HCPCS | Performed by: PHYSICIAN ASSISTANT

## 2020-08-15 PROCEDURE — 71046 X-RAY EXAM CHEST 2 VIEWS: CPT

## 2020-08-15 PROCEDURE — 12000000 ZZH R&B MED SURG/OB

## 2020-08-15 PROCEDURE — 25800030 ZZH RX IP 258 OP 636: Performed by: STUDENT IN AN ORGANIZED HEALTH CARE EDUCATION/TRAINING PROGRAM

## 2020-08-15 PROCEDURE — 84484 ASSAY OF TROPONIN QUANT: CPT | Performed by: PHYSICIAN ASSISTANT

## 2020-08-15 PROCEDURE — 85025 COMPLETE CBC W/AUTO DIFF WBC: CPT | Performed by: PHYSICIAN ASSISTANT

## 2020-08-15 PROCEDURE — 82272 OCCULT BLD FECES 1-3 TESTS: CPT | Performed by: PHYSICIAN ASSISTANT

## 2020-08-15 PROCEDURE — 86923 COMPATIBILITY TEST ELECTRIC: CPT | Performed by: PHYSICIAN ASSISTANT

## 2020-08-15 PROCEDURE — 25000132 ZZH RX MED GY IP 250 OP 250 PS 637: Performed by: PHYSICIAN ASSISTANT

## 2020-08-15 PROCEDURE — 36415 COLL VENOUS BLD VENIPUNCTURE: CPT | Performed by: STUDENT IN AN ORGANIZED HEALTH CARE EDUCATION/TRAINING PROGRAM

## 2020-08-15 PROCEDURE — C9803 HOPD COVID-19 SPEC COLLECT: HCPCS

## 2020-08-15 PROCEDURE — 84132 ASSAY OF SERUM POTASSIUM: CPT | Performed by: STUDENT IN AN ORGANIZED HEALTH CARE EDUCATION/TRAINING PROGRAM

## 2020-08-15 PROCEDURE — 80053 COMPREHEN METABOLIC PANEL: CPT | Performed by: PHYSICIAN ASSISTANT

## 2020-08-15 PROCEDURE — 25000128 H RX IP 250 OP 636: Performed by: PHYSICIAN ASSISTANT

## 2020-08-15 PROCEDURE — 87040 BLOOD CULTURE FOR BACTERIA: CPT | Performed by: PHYSICIAN ASSISTANT

## 2020-08-15 PROCEDURE — 86900 BLOOD TYPING SEROLOGIC ABO: CPT | Performed by: PHYSICIAN ASSISTANT

## 2020-08-15 PROCEDURE — 96375 TX/PRO/DX INJ NEW DRUG ADDON: CPT

## 2020-08-15 PROCEDURE — 93005 ELECTROCARDIOGRAM TRACING: CPT

## 2020-08-15 PROCEDURE — 96361 HYDRATE IV INFUSION ADD-ON: CPT

## 2020-08-15 RX ORDER — ACETAMINOPHEN 500 MG
1000 TABLET ORAL EVERY 4 HOURS PRN
Status: DISCONTINUED | OUTPATIENT
Start: 2020-08-15 | End: 2020-08-18 | Stop reason: HOSPADM

## 2020-08-15 RX ORDER — POTASSIUM CHLORIDE 7.45 MG/ML
10 INJECTION INTRAVENOUS
Status: DISCONTINUED | OUTPATIENT
Start: 2020-08-15 | End: 2020-08-18 | Stop reason: HOSPADM

## 2020-08-15 RX ORDER — POTASSIUM CHLORIDE 1.5 G/1.58G
20-40 POWDER, FOR SOLUTION ORAL
Status: DISCONTINUED | OUTPATIENT
Start: 2020-08-15 | End: 2020-08-18 | Stop reason: HOSPADM

## 2020-08-15 RX ORDER — GABAPENTIN 300 MG/1
300 CAPSULE ORAL EVERY MORNING
Status: DISCONTINUED | OUTPATIENT
Start: 2020-08-16 | End: 2020-08-15 | Stop reason: CLARIF

## 2020-08-15 RX ORDER — POTASSIUM CL/LIDO/0.9 % NACL 10MEQ/0.1L
10 INTRAVENOUS SOLUTION, PIGGYBACK (ML) INTRAVENOUS
Status: DISCONTINUED | OUTPATIENT
Start: 2020-08-15 | End: 2020-08-18 | Stop reason: HOSPADM

## 2020-08-15 RX ORDER — POTASSIUM CHLORIDE 1.5 G/1.58G
20 POWDER, FOR SOLUTION ORAL ONCE
Status: COMPLETED | OUTPATIENT
Start: 2020-08-15 | End: 2020-08-15

## 2020-08-15 RX ORDER — PROCHLORPERAZINE MALEATE 5 MG
5 TABLET ORAL EVERY 6 HOURS PRN
Status: DISCONTINUED | OUTPATIENT
Start: 2020-08-15 | End: 2020-08-18 | Stop reason: HOSPADM

## 2020-08-15 RX ORDER — GABAPENTIN 300 MG/1
900 CAPSULE ORAL AT BEDTIME
Status: DISCONTINUED | OUTPATIENT
Start: 2020-08-15 | End: 2020-08-18 | Stop reason: HOSPADM

## 2020-08-15 RX ORDER — ONDANSETRON 2 MG/ML
4 INJECTION INTRAMUSCULAR; INTRAVENOUS EVERY 6 HOURS PRN
Status: DISCONTINUED | OUTPATIENT
Start: 2020-08-15 | End: 2020-08-18 | Stop reason: HOSPADM

## 2020-08-15 RX ORDER — POTASSIUM CHLORIDE 1500 MG/1
20-40 TABLET, EXTENDED RELEASE ORAL
Status: DISCONTINUED | OUTPATIENT
Start: 2020-08-15 | End: 2020-08-18 | Stop reason: HOSPADM

## 2020-08-15 RX ORDER — SODIUM CHLORIDE, SODIUM LACTATE, POTASSIUM CHLORIDE, CALCIUM CHLORIDE 600; 310; 30; 20 MG/100ML; MG/100ML; MG/100ML; MG/100ML
INJECTION, SOLUTION INTRAVENOUS CONTINUOUS
Status: ACTIVE | OUTPATIENT
Start: 2020-08-15 | End: 2020-08-16

## 2020-08-15 RX ORDER — HYDROXYZINE HYDROCHLORIDE 25 MG/1
50 TABLET, FILM COATED ORAL EVERY 6 HOURS PRN
Status: DISCONTINUED | OUTPATIENT
Start: 2020-08-15 | End: 2020-08-18 | Stop reason: HOSPADM

## 2020-08-15 RX ORDER — ONDANSETRON 4 MG/1
4 TABLET, ORALLY DISINTEGRATING ORAL EVERY 6 HOURS PRN
Status: DISCONTINUED | OUTPATIENT
Start: 2020-08-15 | End: 2020-08-18 | Stop reason: HOSPADM

## 2020-08-15 RX ORDER — PROCHLORPERAZINE 25 MG
12.5 SUPPOSITORY, RECTAL RECTAL EVERY 12 HOURS PRN
Status: DISCONTINUED | OUTPATIENT
Start: 2020-08-15 | End: 2020-08-18 | Stop reason: HOSPADM

## 2020-08-15 RX ORDER — ATORVASTATIN CALCIUM 10 MG/1
10 TABLET, FILM COATED ORAL DAILY
Status: DISCONTINUED | OUTPATIENT
Start: 2020-08-16 | End: 2020-08-18 | Stop reason: HOSPADM

## 2020-08-15 RX ORDER — LIDOCAINE 40 MG/G
CREAM TOPICAL
Status: DISCONTINUED | OUTPATIENT
Start: 2020-08-15 | End: 2020-08-18 | Stop reason: HOSPADM

## 2020-08-15 RX ORDER — PANTOPRAZOLE SODIUM 40 MG/1
40 TABLET, DELAYED RELEASE ORAL
Status: DISCONTINUED | OUTPATIENT
Start: 2020-08-16 | End: 2020-08-15

## 2020-08-15 RX ORDER — NALOXONE HYDROCHLORIDE 0.4 MG/ML
.1-.4 INJECTION, SOLUTION INTRAMUSCULAR; INTRAVENOUS; SUBCUTANEOUS
Status: DISCONTINUED | OUTPATIENT
Start: 2020-08-15 | End: 2020-08-18 | Stop reason: HOSPADM

## 2020-08-15 RX ORDER — POTASSIUM CHLORIDE 29.8 MG/ML
20 INJECTION INTRAVENOUS
Status: DISCONTINUED | OUTPATIENT
Start: 2020-08-15 | End: 2020-08-18 | Stop reason: HOSPADM

## 2020-08-15 RX ORDER — TAMSULOSIN HYDROCHLORIDE 0.4 MG/1
0.4 CAPSULE ORAL DAILY
Status: DISCONTINUED | OUTPATIENT
Start: 2020-08-16 | End: 2020-08-15

## 2020-08-15 RX ORDER — HYDROXYZINE HYDROCHLORIDE 25 MG/1
50 TABLET, FILM COATED ORAL AT BEDTIME
Status: ON HOLD | COMMUNITY
End: 2020-09-14

## 2020-08-15 RX ORDER — POTASSIUM CHLORIDE 7.45 MG/ML
10 INJECTION INTRAVENOUS ONCE
Status: COMPLETED | OUTPATIENT
Start: 2020-08-15 | End: 2020-08-15

## 2020-08-15 RX ADMIN — SODIUM CHLORIDE, POTASSIUM CHLORIDE, SODIUM LACTATE AND CALCIUM CHLORIDE: 600; 310; 30; 20 INJECTION, SOLUTION INTRAVENOUS at 19:48

## 2020-08-15 RX ADMIN — SODIUM CHLORIDE 80 MG: 9 INJECTION, SOLUTION INTRAVENOUS at 17:58

## 2020-08-15 RX ADMIN — POTASSIUM CHLORIDE 10 MEQ: 7.46 INJECTION, SOLUTION INTRAVENOUS at 15:57

## 2020-08-15 RX ADMIN — POTASSIUM CHLORIDE 20 MEQ: 1.5 POWDER, FOR SOLUTION ORAL at 15:55

## 2020-08-15 RX ADMIN — SODIUM CHLORIDE 1000 ML: 9 INJECTION, SOLUTION INTRAVENOUS at 14:55

## 2020-08-15 ASSESSMENT — ENCOUNTER SYMPTOMS
FEVER: 0
COUGH: 1
LIGHT-HEADEDNESS: 1
ABDOMINAL PAIN: 0
WEAKNESS: 1
DYSURIA: 0
ROS GI COMMENTS: BLACK STOOLS
SPEECH DIFFICULTY: 0
HEADACHES: 0
FATIGUE: 1
SHORTNESS OF BREATH: 0

## 2020-08-15 ASSESSMENT — ACTIVITIES OF DAILY LIVING (ADL): ADLS_ACUITY_SCORE: 17

## 2020-08-15 NOTE — PROGRESS NOTES
RECEIVING UNIT ED HANDOFF REVIEW    ED Nurse Handoff Report was reviewed by: Carol Dawson RN on August 15, 2020 at 6:00 PM

## 2020-08-15 NOTE — TELEPHONE ENCOUNTER
S: Hypotensive.  B: About one month ago was hospitalized for a GI bleed.  Today B/P 87/44 HR 56.   8/11 Hbg 7.4. At times is dizzy sits down and the dizziness goes away.  A: Per guidelines to go to ED.  R: Will go to [facility] ED immediately. .  Elva Rodriguez RN, [facility]      Reason for Disposition    [1] Systolic BP < 90 AND [2] dizzy, lightheaded, or weak    Protocols used: LOW BLOOD PRESSURE-A-AH    COVID 19 Nurse Triage Plan/Patient Instructions    Please be aware that novel coronavirus (COVID-19) may be circulating in the community. If you develop symptoms such as fever, cough, or SOB or if you have concerns about the presence of another infection including coronavirus (COVID-19), please contact your health care provider or visit [email or web address].     Disposition/Instructions    ED Visit recommended. Follow protocol based instructions.     Bring Your Own Device:  Please also bring your smart device(s) (smart phones, tablets, laptops) and their charging cables for your personal use and to communicate with your care team during your visit.    Thank you for taking steps to prevent the spread of this virus.  o Limit your contact with others.  o Wear a simple mask to cover your cough.  o Wash your hands well and often.    Resources    [facility]: About COVID-19: [email or web address]    CDC: What to Do If You're Sick: www.cdc.gov/coronavirus/2019-ncov/about/steps-when-sick.html    CDC: Ending Home Isolation: www.cdc.gov/coronavirus/2019-ncov/hcp/disposition-in-home-patients.html     CDC: Caring for Someone: www.cdc.gov/coronavirus/2019-ncov/if-you-are-sick/care-for-someone.html     [facility]: Interim Guidance for Hospital Discharge to Home: www.health.[facility].mn.us/diseases/coronavirus/hcp/hospdischarge.pdf    [facility] clinical trials (COVID-19 research studies): [email or web address]-[email or web address]-trials     Below are the COVID-19 hotlines at the [facility]  Health ([facility]). Interpreters are available.   o For health questions: Call [phone] or [phone] (7 a.m. to 7 p.m.)  o For questions about schools and childcare: Call [phone] or 1-166.715.7506 (7 a.m. to 7 p.m.)

## 2020-08-15 NOTE — ED PROVIDER NOTES
"  History     Chief Complaint:  Generalized Weakness       The history is provided by the patient. History limited by: poor historian.      Hermilo Velasquez is a 87 year old male with a history of hypertension, chronic iron deficiency anemia due to chronic blood loss, and chronic PE, Non-Hodgkin's lymphoma, right lung cancer, paroxysmal atrial fibrillation, anticoagulated on Eliquis, who presents for evaluation of generalized weakness. Per chart review, the patient was hospitalized at [facility] in [place], [place] from 7/7/2020-7/10/2020 for weakness and fever. He was found to have pneumonia of the right middle lobe, and was placed on IV ceftriaxone and azithromycin. He had questionable black stool with a positive heoccult at admission. His hemoglobin decreased went down to 5.9, which increased to 8.7 after a RBC transfusion. An EGD was pursued, although no source of bleeding was found. He then got iron and antibiotics. His COVID-19 results were negative at this time.     This morning, the patient had a blood pressure of 86/44 and was feeling light-headed. He notes he has been feeling generally weak and fatigued for about one week. He also endorses black stools. The patient states he \"had a mild cough the other day,\" but this has subsided. He denies any abdominal pain, chest pain, shortness of breath, dysuria, fever, headaches, speech difficulty, or one-sided weakness. He states he usually takes his blood pressure daily, and runs around 119 systolic. The patient is not on chemotherapy. He was previously on Lasix, although he was taken off because his blood pressure was dropping.       Allergies:  Lidocaine  Omeprazole  Pantoprazole  Prevacid [Lansoprazole]  Lasix [Furosemide]  Penicillins    Medications:   Albuterol inhaler  Lipitor  Eliquis  Omnicef  Pepcid  Gabapentin  [place]  Atarax  Claritin  Toprol-XL  Omeprazole  Flomax  Demadex   Spiriva handihaler   Protonix     Past Medical History:  "   Aortic stenosis  Paroxysmal atrial fibrillation   Deep vein thrombosis  GERD  Heart murmur  Monoclonal gammopathy  Neuropathy   Hypertension   Hyperlipidemia  Right bundle branch block   Severe sepsis   Idiopathic peripheral neuropathy  Ventral hernia  Non Hodgkins lymphoma   Anemia due to chronic blood loss   Right bundle branch block   Benign neoplasm of colon   Gastrointestinal hemorrhage with melena  Osteoarthritis of bilateral knees   Leg edema, left  Chronic obstructive pulmonary disease  Nodule of lower lobe of right lung   Community acquired pneumonia  Malignant neoplasm of lung  Acute respiratory failure with hypoxia   Chronic PE    Past Surgical History:    Appendectomy   Total knee arthroplasty, right  Back surgery  EGD x2  Herniorrhaphy ventral  Lobectomy lung, right  Repair ligament ankle, left  Replace valve aortic   Rotator cuff repair bilateral  3 spine surgeries  Thoracotomy, wedge resection lung, combined, right  Tonsillectomy   TURP   IVC filter    Family History:    Father: CAD, emphysema    Social History:  The patient presents unaccompanied to the ED.  PCP: Karson Bishop  Smoking status: Former Smoker, quit 22.5 years ago  Smokeless tobacco: Never Used  Alcohol use: Positive, 1 drink per day  Drug use: Negative  Lives with his wife    Review of Systems   Constitutional: Positive for fatigue. Negative for fever.   Respiratory: Positive for cough. Negative for shortness of breath.    Cardiovascular: Negative for chest pain.   Gastrointestinal: Negative for abdominal pain.        Black stools   Genitourinary: Negative for dysuria.   Neurological: Positive for weakness and light-headedness. Negative for speech difficulty and headaches.   All other systems reviewed and are negative.      Physical Exam     Patient Vitals for the past 24 hrs:   BP Temp Temp src Pulse Heart Rate Resp SpO2   08/15/20 1730 130/73 -- -- 83 -- -- (!) 77 %   08/15/20 1722 116/64 97.5  F (36.4  C) Oral -- 82 16 96 %    08/15/20 1700 131/70 -- -- 73 -- -- --   08/15/20 1654 131/70 97.8  F (36.6  C) -- 73 -- 16 --   08/15/20 1407 124/54 98  F (36.7  C) Temporal -- 83 18 95 %     Orthostatics (taken at 1617)  Lying Orthostatic BP: 120/68   Lying Orthostatic Pulse: 70 bpm    Sitting Orthostatic BP: 114/62   Sitting Orthostatic Pulse: 79 bpm    Standing Orthostatic BP: 101/50   Standing Orthostatic Pulse: 100 bpm    Physical Exam  General: Well appearing, well nourished. Normal mood and affect.  Skin: Good turgor, no rash, no unusual bruising or prominent lesions.  HEENT: Head: Normocephalic, atraumatic, no visible masses.   Eyes: Conjunctiva clear.  Throat/pharynx: Mucous membranes moist, no mucosal lesions.   Cardiac: Normal rate and regular rhythm, no murmur or gallop.   Lungs: Clear to auscultation.  Abdomen: Abdomen soft, non-tender. No rebound tenderness of guarding.   Musculoskeletal: Normal gait and station. No calf tenderness or swelling.   Neurologic: Oriented x 3. GCS: 15.  Psychiatric: Intact recent and remote memory, judgment and insight, normal mood and affect.    Rectal: Normal sphincter tone, no hemorrhoids or masses palpable.  No obvious melena.    Emergency Department Course   ECG   Time: 1510  Vent. Rate 68 bpm. MS interval 204. QRS duration 142. QT/QTc 446/474. P-R-T axis 37 15 50.  Normal sinus rhythm   Right bundle branch block  Abnormal ECG  Read time: 1510    Imaging:  Radiographic findings were communicated with the patient who voiced understanding of the findings.    XR Chest 2 Views  IMPRESSION: Minimal pleural fluid or pleural thickening on the right.  Stable minimal interstitial changes. Left lung clear. The cardiac  silhouette is not enlarged. Pulmonary vasculature is unremarkable. Reading per radiology.     Laboratory:  Laboratory findings were communicated with the patient who voiced understanding of the findings.    CBC: RBC 3.12 (L), HGB 7.4 (L), Hematocrit 7.4 (L), MCH 23.7 (L), MCHC 29.7 (L), RDW  17.1 (H), Absolute Eosinophils 0.8 (H), o/w WNL. (WBC 9.2, )    CMP: Potassium 3.0 (L), Carbon Dioxide 34 (H), Glucose 133 (H), Creatinine 1.71 (H), GFR Estimate 35 (L), Calcium 8.2 (L), Albumin 2.9 (L), Protein Total 6.7 (L), o/w WNL    Lactic Acid (1451): 1.2     Troponin (Collected 1450): <0.015    ABO/Rh type and screen: Units Ordered 1, ABO A, RH (D) Pos, Antibody Screen Negative    UA: pending     Occult Blood stool: positive    Asymptomatic COVID-19 Virus (Coronavirus) by PCR: pending    Interventions:  1455 NS 1000 mL IV Bolus   1555 Potassium chloride 20 mEq PO  1557 Potassium chloride 10 mEq IV     Emergency Department Course:  Past medical records, nursing notes, and vitals reviewed.   1431 I performed an exam of the patient and obtained history, as documented above.    EKG obtained in the ED, see results above.      IV was inserted and blood was drawn for laboratory testing, results above.     The patient was sent for a XR Chest while in the emergency department, results above.      1532 I performed a rectal exam of the patient with a male chaperone.     The patient provided a stool sample here in the emergency department. This was sent for laboratory testing, findings above.     1613 I rechecked and explained findings to the patient.    1641 I rechecked and updated the patient.     I shared service with Dr. Montiel, who agreed to see this patient; see his note.     1707 I spoke with Dr. Locke, hospitalist, who agreed to admit the patient.     Findings and plan explained to the patient who consents to admission. Discussed the patient with Dr. Fajardo, who will admit the patient to a Adult Med/Surg bed for further monitoring, evaluation, and treatment.    I personally reviewed the laboratory and imaging results with the patient and answered all related questions prior to admission.     Impression & Plan      Medical Decision Making:  Hermilo Velasquez is a 87 year old male who presents the ED today  for evaluation of generalized weakness and concern for low hemoglobin.  Details of the patient's history can be noted in the HPI.  Differentials considered included GI bleed, anemia due to blood loss, anemia due to iron deficiency, sepsis, pneumonia, COPD exacerbation, electrolyte disturbance, arrhythmia, ACS, PE, amongst others.  On my exam, patient who appeared well and was neurologically intact.  Basic labs obtained showing no leukocytosis and lactate within normal limits.  Anemia noted at 7.4.  Mild hypokalemia at 3.  Creatinine mildly elevated from baseline at 1.71.  This was supplemented IV and orally.  Very minimal pleural fluid noted on right side of chest x-ray.  Troponin within normal limits.  ECG shows no change from previous.  Orthostatics completed, showing increase in heart rate and decrease in blood pressure.  He was mildly symptomatic with this as well.  Stool occult positive.      Given his symptomatic anemia and complex past medical history, I do feel that he warrants admission for further monitoring and care.  We have blood type and screened him and have ordered blood products here.  Protonix also initiated.  He would would benefit from GI consult and monitoring of hemoglobin.  Patient notes that he has been taking his PPI at home.  Of note, he had an upper endoscopy within the last month that did not show any active source of bleeding, but has had GI bleed within the last year when he was on Coumadin.  Previous colonoscopy (2018) has shown non-bleeding angioid ectasias and several polyps.  Given his presentation, I did consult with Dr. Locke who agreed to admit the patient into the hospital for further monitoring and care.  All questions were answered prior to admission.  Patient was in agreement with the treatment plan as stated above.    Diagnosis:    ICD-10-CM    1. Anemia  D64.9 Blood component     Asymptomatic COVID-19 Virus (Coronavirus) by PCR   2. Generalized weakness  R53.1    3. Occult  blood positive stool  R19.5       Disposition:  Admitted to Med/Surg.      Scribe Disclosure:  I, Bina Harry, am serving as a scribe at 2:31 PM on 8/15/2020 to document services personally performed by Holly Malagon PA based on my observations and the provider's statements to me.     This was created at least in part with a voice recognition software. Mistakes/typos may be present.      8/15/2020   Holly Culver PA  08/15/20 1805

## 2020-08-15 NOTE — ED PROVIDER NOTES
Emergency Department Attending Supervision Note  8/15/2020  4:01 PM      I evaluated this patient in conjunction with HEMAL Quevedo       Briefly, the patient with a recent history of GI bleed requiring transfusion presented with weakness    On my exam,  General: Alert, No distress. Nontoxic appearance  Head: No signs of trauma.   Mouth/Throat: Oropharynx moist.   Eyes: Conjunctivae are normal. Pupils are equal..   Neck: Normal range of motion.    CV: Appears well perfused.  Resp:No respiratory distress.   MSK: Normal range of motion. No obvious deformity.   Neuro: The patient is alert and interactive. FINN. Speech normal. GCS 15  Skin: No lesion or sign of trauma noted.   Psych: normal mood and affect. behavior is normal.     Results:  ECG   Time: 1510  Vent. Rate 68 bpm. HI interval 204. QRS duration 142. QT/QTc 446/474. P-R-T axis 37 15 50.  Normal sinus rhythm , Right bundle branch block   Imaging:  XR Chest 2 Views  IMPRESSION: Minimal pleural fluid or pleural thickening on the right. Stable minimal interstitial changes. Left lung clear. The cardiac silhouette is not enlarged. Pulmonary vasculature is unremarkable.      Laboratory:  CBC: RBC 3.12 (L), HGB 7.4 (L), Hematocrit 7.4 (L), MCH 23.7 (L), MCHC 29.7 (L), RDW 17.1 (H), Absolute Eosinophils 0.8 (H), o/w WNL. (WBC 9.2, )    CMP: Potassium 3.0 (L), Carbon Dioxide 34 (H), Glucose 133 (H), Creatinine 1.71 (H), GFR Estimate 35 (L), Calcium 8.2 (L), Albumin 2.9 (L), Protein Total 6.7 (L), o/w WNL  Lactic Acid (1451): 1.2   Troponin (Collected 1450): <0.015  ABO/Rh type and screen: Units Ordered 1, ABO A, RH (D) Pos, Antibody Screen Negative  UA: pending      Occult Blood stool: positive     Asymptomatic COVID-19 Virus (Coronavirus) by PCR: pending    ED course:  1455    NS 1000 mL IV Bolus   1555    Potassium chloride 20 mEq PO  1557    Potassium chloride 10 mEq IV     My impression is   Diagnosis    ICD-10-CM    1. Anemia  D64.9 Blood culture      Blood culture     Blood component     Asymptomatic COVID-19 Virus (Coronavirus) by PCR     Potassium   2. Generalized weakness  R53.1    3. Occult blood positive stool  R19.5      Plan: Admit     Emery Montiel MD  08/15/20 0816

## 2020-08-15 NOTE — ED NOTES
"[facility]  ED Nurse Handoff Report    ED Chief complaint: Generalized Weakness      ED Diagnosis:   Final diagnoses:   Anemia   Generalized weakness   Occult blood positive stool       Code Status: Full Code    Allergies:   Allergies   Allergen Reactions     Lidocaine Blisters     Allergy to lidocaine ointment     Omeprazole Itching     Pantoprazole Itching     Prevacid [Lansoprazole] Itching     Lasix [Furosemide] Rash     Penicillins Rash     \"broke out from injection\" 60 yrs ago         Patient Story: 87 year old male  anticoagulated on Eliquis, who presents for evaluation of generalized weakness. Was hospitalized in early July with pneumonia and low Hgb. His COVID-19 results were negative at that time.      This morning, the patient had a blood pressure of 86/44 and was feeling light-headed. He notes he has been feeling generally weak and fatigued for about one week. He also endorses black stools. He denies any abdominal pain, chest pain, shortness of breath, dysuria, fever, headaches, speech difficulty, or one-sided   .  Focused Assessment:  A/O x4, very [place], ambulates with a cane, no complaints of pain, gets slightly symptomatic with position changes,no focal weakness    Treatments and/or interventions provided:   Medications   potassium chloride 10 mEq in 100 mL sterile water intermittent infusion (premix) (10 mEq Intravenous New Bag 8/15/20 1557)   0.9% sodium chloride BOLUS (0 mLs Intravenous Stopped 8/15/20 1536)   potassium chloride (KLOR-CON) Packet 20 mEq (20 mEq Oral Given 8/15/20 1555)     Ordered dinner while waiting for blood and waiting for room placement    Patient's response to treatments and/or interventions: resting quietly    To be done/followed up on inpatient unit:  see in-patient orders    Does this patient have any cognitive concerns?: none    Activity level - Baseline/Home:  Cane  Activity Level - Current:   Cane    Patient's Preferred language: English   " Needed?: No    Isolation: None. Asymptomatic covid swab sent to lab  Infection: Not Applicable  Bariatric?: No    Vital Signs:   Vitals:    08/15/20 1407   BP: 124/54   Resp: 18   Temp: 98  F (36.7  C)   TempSrc: Temporal   SpO2: 95%       Cardiac Rhythm:     Was the PSS-3 completed:   Yes  What interventions are required if any?               Family Comments: none present  OBS brochure/video discussed/provided to patient/family: Yes              Name of person given brochure if not patient:               Relationship to patient:     For the majority of the shift this patient's behavior was Green.   Behavioral interventions performed were.    ED NURSE PHONE NUMBER: *[phone]

## 2020-08-15 NOTE — H&P
[facility]    History and Physical - Hospitalist Service       Date of Admission:  8/15/2020    Assessment & Plan   Hermilo Velasquez is a 87 year old male admitted on 8/15/2020. He presents with generalized weakness.     Acute Blood Loss Anemia  History of gastroesophageal reflux disease, gastric and duodenal angiectasias   Assessment: Presents with increasing generalized weakness and fatigue, his hemoglobin roughly 1 month prior to mission was 9.8, on admission it is currently at 7.4.  No direct endorsements of melena or bright red blood per rectum, but do worry that given his history of AVMs, gastric and duodenal angiectasia's, and he is anticoagulated that he is having a possible acute GI bleed.  He is otherwise hemodynamically stable, overall nontoxic-appearing.  Plan:  - admit to inpatient  - Follow vitals/temp  - [facility] consult  - hold PTA Eliquis  - Transfuse hgb < 7.0  - N.p.o. at midnight    Hx of RLL PE  Assessment: PTA on Eliquis  Plan:  - hold eliquis.      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 On admission Cr 1.71, suspect this is pre-renal in etiology.   Plan:   - IVF  - Follow Cr/electrolytes     CAD  Patient denies any recent chest pain, palpitations, or shortness of breath. He reports compliance with his home medications.   Plan:  -Continue bid metoprolol at 12.5 mg BID     Paroxysmal atrial fibrillation on Eliquis   Assessment/Plan: continue metoprolol and hold Eliquis     Hyperlipidemia: Continue PTA statin     Chronic pruritus :PTA Atarax       History of lung cancer and NHL in remission     Bullous pemphigoid  Assessment/Plan: stable.            Diet: Clear Liquid diet  DVT Prophylaxis: Pneumatic Compression Devices  Suazo Catheter: not present  Code Status: FULL CODE         Disposition Plan   Expected discharge: 2 - 3 days, recommended  to prior living arrangement once hemoglobin stable.  Entered: Quintin Fajardo MD 08/15/2020, 5:22 PM     The patient's care was discussed with the Patient and ED Provider.    Quintin Fajardo MD  [facility]    ______________________________________________________________________    Chief Complaint     Generalized Weakness    History is obtained from the patient    History of Present Illness   Hermilo Velasquez is a 87 year old male with past medical history of AVMs, peptic ulcer disease, coronary disease, paroxysmal A. fib on Eliquis, PE, hyperlipidemia, hypertension who presents for evaluation of generalized weakness.    Patient was admitted TO [facility] in [place], [place] from 7/7/2020-7/10/2020 for weakness and fever. He was found to have pneumonia of the right middle lobe, and was started on IV ceftriaxone and azithromycin. He had questionable black stool with a positive heoccult at admission. His hemoglobin decreased went down to 5.9, which increased to 8.7 after a RBC transfusion. An EGD was pursued, but no source of bleeding was found. His COVID-19 results were negative at this time.     Patient reports that he woke up on the morning of admission feeling somewhat lightheaded, and he reports over the last week, he said increasing generalized weakness and fatigue.  He denies any bright red blood, but does notice that his stools have been darker.  He denies any abdominal pain, he denies any nausea/vomiting, hemoptysis or hematemesis.  He denies any chest pain or shortness of breath.  He denies any fevers or chills, no cough, no headaches, no slurred speech or localized weakness or numbness of extremities.  He is not currently on any chemotherapy, he is no longer on a diuretic due to lower blood pressures in the past.  He reports that he was told by his primary care provider that his hemoglobin is dropping on a recent blood test and he was recommended to seek further evaluation  emergency department.  He otherwise denies any hematuria, he denies any dysuria.  He is currently on Eliquis.  He otherwise has no other complaints this time.    Review of Systems    The 10 point Review of Systems is negative other than noted in the HPI or here.     Past Medical History    I have reviewed this patient's medical history and updated it with pertinent information if needed.   Past Medical History:   Diagnosis Date     Aortic stenosis     Severe AS, 9/2015 AVR - [facility] TRIFECTA Bovine bioprosthesis 25MM TF-25A     Atrial fibrillation (H)     9/2015 Paroxysmal post op Afib - discharged on Warfarin and a beta blocker     Deep vein thrombosis (H)      GERD (gastroesophageal reflux disease)      Heart murmur      Monoclonal gammopathy     plasmacyte prominent causing monoclonal gammopathy     Need for SBE (subacute bacterial endocarditis) prophylaxis      Neuropathy      Other and unspecified hyperlipidemia      Other malignant lymphomas     non hodgkin's lymphoma     RBBB (right bundle branch block)      Severe sepsis with acute organ dysfunction (H) 11/16/2015     Unspecified hereditary and idiopathic peripheral neuropathy        Past Surgical History   I have reviewed this patient's surgical history and updated it with pertinent information if needed.  Past Surgical History:   Procedure Laterality Date     APPENDECTOMY       AS TOTAL KNEE ARTHROPLASTY       BACK SURGERY       ESOPHAGOSCOPY, GASTROSCOPY, DUODENOSCOPY (EGD), COMBINED N/A 11/28/2015    Procedure: COMBINED ESOPHAGOSCOPY, GASTROSCOPY, DUODENOSCOPY (EGD);  Surgeon: Danis Castillo MD;  Location:  GI     ESOPHAGOSCOPY, GASTROSCOPY, DUODENOSCOPY (EGD), COMBINED N/A 7/26/2018    Procedure: COMBINED ESOPHAGOSCOPY, GASTROSCOPY, DUODENOSCOPY (EGD);;  Surgeon: Toby Dong DO;  Location:  GI     HERNIA REPAIR  2006     HERNIORRHAPHY VENTRAL  4/17/2013    Procedure: HERNIORRHAPHY VENTRAL;  VENTRAL HERNIA REPAIR WITH MESH;  Surgeon:  Patel Guzman MD;  Location:  OR     KNEE SURGERY      arthroscopic right knee surgery      LOBECTOMY LUNG Right 3/26/2019    Procedure: POSSIBLE LOBECTOMY LUNG;  Surgeon: Jose A Vasques MD;  Location:  OR     REPAIR LIGAMENT ANKLE  2012    Procedure:REPAIR LIGAMENT ANKLE; LEFT TARSAL TUNNEL RELEASE OF KNOT OF ARIEL RELEASE; Surgeon:SAUL PUENTE; Location: OR     REPLACE VALVE AORTIC N/A 9/3/2015    Procedure: REPLACE VALVE AORTIC;  Surgeon: Antonino Mitchell MD;  Location:  OR     ROTATOR CUFF REPAIR RT/LT      bilateral     SPINE SURGERY      3 spine surgeries     THORACOTOMY, WEDGE RESECTION LUNG, COMBINED Right 3/26/2019    Procedure: RIGHT THORACOTOMY, WEDGE RESECTION, RIGHT LOWER LOBE LUNG NODULE,;  Surgeon: Jose A Vasques MD;  Location:  OR     TONSILLECTOMY       TURP         Social History   I have reviewed this patient's social history and updated it with pertinent information if needed.  Social History     Tobacco Use     Smoking status: Former Smoker     Packs/day: 2.00     Years: 55.00     Pack years: 110.00     Last attempt to quit: 1998     Years since quittin.5     Smokeless tobacco: Never Used   Substance Use Topics     Alcohol use: Yes     Alcohol/week: 0.0 standard drinks     Comment: 1 drink per day     Drug use: No       Family History   I have reviewed this patient's family history and updated it with pertinent information if needed.  Family History   Problem Relation Age of Onset     C.A.D. Father      Emphysema Father      Melanoma No family hx of      Skin Cancer No family hx of        Prior to Admission Medications   Prior to Admission Medications   Prescriptions Last Dose Informant Patient Reported? Taking?   HYDROcodone-acetaminophen (NORCO) 5-325 MG tablet   No No   Sig: Take 1 tablet by mouth every 6 hours as needed for pain   acetaminophen (TYLENOL) 500 MG tablet   Yes No   Sig: Take 2 tablets by mouth every 4  hours   albuterol (PROAIR HFA/PROVENTIL HFA/VENTOLIN HFA) 108 (90 Base) MCG/ACT Inhaler  Self No No   Sig: Inhale 1-2 puffs into the lungs every 6 hours as needed for shortness of breath / dyspnea or wheezing   apixaban ANTICOAGULANT (ELIQUIS ANTICOAGULANT) 5 MG tablet   No No   Sig: Take 1 tablet (5 mg) by mouth 2 times daily   apixaban ANTICOAGULANT (ELIQUIS ANTICOAGULANT) 5 MG tablet   Yes No   Si mg daily   atorvastatin (LIPITOR) 10 MG tablet   No No   Sig: Take 1 tablet (10 mg) by mouth daily   cefdinir (OMNICEF) 300 MG capsule   Yes No   Sig: Take 1 capsule by mouth every 12 hours for 7 days. Indications: a lower respiratory infection   famotidine (PEPCID) 40 MG tablet   No No   Sig: Take 1 tablet (40 mg) by mouth 2 times daily as needed (heartburn)   famotidine (PEPCID) 40 MG tablet   No No   Sig: Take 1 tablet (40 mg) by mouth 2 times daily   ferrous sulfate (FE TABS) 325 (65 Fe) MG EC tablet   No No   Sig: Take 1 tablet (325 mg) by mouth three times a week   gabapentin (NEURONTIN) 300 MG capsule   Yes No   Sig: Take 300 mg by mouth every morning   gabapentin (NEURONTIN) 300 MG capsule   Yes No   Sig: Take 900 mg by mouth At Bedtime   hydrOXYzine (ATARAX) 25 MG tablet   No No   Sig: Take 2 tablets (50 mg) by mouth every 6 hours as needed   loratadine (CLARITIN) 10 MG tablet  Self Yes No   Sig: Take 10 mg by mouth daily as needed for allergies    metoprolol succinate ER (TOPROL-XL) 25 MG 24 hr tablet   No No   Sig: Take 0.5 tablets (12.5 mg) by mouth daily   omeprazole (PRILOSEC) 20 MG DR capsule   Yes No   Sig: Take 1 capsule by mouth every 24 hours   order for DME   No No   Sig: Equipment being ordered: Right neoprene knee brace.  ( Fax to [facility] fax  [phone])   pantoprazole (PROTONIX) 40 MG EC tablet   No No   Sig: Take 1 tablet (40 mg) by mouth 2 times daily (before meals)   rivaroxaban ANTICOAGULANT (XARELTO ANTICOAGULANT) 20 MG TABS tablet   Yes No   Sig: Take 20 mg by mouth  "  tamsulosin (FLOMAX) 0.4 MG capsule   Yes No   Sig: Take 0.4 mg by mouth daily   tiotropium (SPIRIVA HANDIHALER) 18 MCG capsule  Self No No   Sig: Inhale 1 capsule (18 mcg) into the lungs daily   torsemide (DEMADEX) 20 MG tablet   No No   Sig: Take 1 tablet (20 mg) by mouth daily   triamcinolone (KENALOG) 0.1 % external cream   No No   Sig: Apply topically 2 times daily as needed for irritation      Facility-Administered Medications: None     Allergies   Allergies   Allergen Reactions     Lidocaine Blisters     Allergy to lidocaine ointment     Omeprazole Itching     Pantoprazole Itching     Prevacid [Lansoprazole] Itching     Lasix [Furosemide] Rash     Penicillins Rash     \"broke out from injection\" 60 yrs ago         Physical Exam   Vital Signs: Temp: 97.8  F (36.6  C) Temp src: Temporal BP: 131/70 Pulse: 73 Heart Rate: 83 Resp: 16 SpO2: 95 % O2 Device: None (Room air)    Weight: 0 lbs 0 oz  Constitutional: awake, alert, cooperative, no apparent distress.   Eyes: Lids and lashes normal, pupils equal, round and reactive to light   ENT: Normocephalic, without obvious abnormality, atraumatic, sinuses nontender on palpation   Hematologic / Lymphatic: no cervical lymphadenopathy   Respiratory: CTABL   Cardiovascular: RRR with no m/r/g   GI: Normal bowel sounds, soft, non-distended, non-tender. Skin: normal skin color, texture, turgor   Musculoskeletal: There is no redness, warmth, or swelling of the joints. Full range of motion noted.   Neurologic: Awake, alert, oriented to name, place and time. Cranial nerves II-XII are grossly intact. Motor is 5 out of 5 bilaterally. Sensory is intact.   Neuropsychiatric: normal mood and affect      Data   Data reviewed today: I reviewed all medications, new labs and imaging results over the last 24 hours. I personally reviewed the EKG tracing showing NSR with RBBB and the chest x-ray image(s) showing see below.    Most Recent 3 CBC's:  Recent Labs   Lab Test 08/15/20  1450 " 08/11/20  1141 07/20/20  1327 06/18/20  1144   WBC 9.2 7.5  --  7.9   HGB 7.4* 7.4* 9.8* 9.1*   MCV 80 83  --  81    255  --  281     Most Recent 3 BMP's:  Recent Labs   Lab Test 08/15/20  1450 08/11/20  1141 07/07/20 04/01/20  0710    140  --  138   POTASSIUM 3.0* 4.4 4.6 4.2   CHLORIDE 101 108  --  106   CO2 34* 24  --  32   BUN 28 18  --  25   CR 1.71* 1.17 1.37* 0.96   ANIONGAP 4 8  --  <1*   AMRITA 8.2* 8.7  --  8.3*   * 98 139* 96     Most Recent 2 LFT's:  Recent Labs   Lab Test 08/15/20  1450 07/07/20 03/23/20  0459   AST 7 18  --  126*   ALT 10 8   < > 30   ALKPHOS 100  --   --  55   BILITOTAL 0.4  --   --  0.6    < > = values in this interval not displayed.     Most Recent 3 INR's:  Recent Labs   Lab Test 03/28/20  0727 03/24/20  1201 03/23/20  0459   INR 1.03 1.28* 1.68*     Recent Results (from the past 24 hour(s))   XR Chest 2 Views    Narrative    CHEST TWO VIEWS 8/15/2020 3:10 PM     HISTORY: Weakness.    COMPARISON: 3/26/2020       Impression    IMPRESSION: Minimal pleural fluid or pleural thickening on the right.  Stable minimal interstitial changes. Left lung clear. The cardiac  silhouette is not enlarged. Pulmonary vasculature is unremarkable.    FUENTES SIMON MD

## 2020-08-16 ENCOUNTER — APPOINTMENT (OUTPATIENT)
Dept: PHYSICAL THERAPY | Facility: CLINIC | Age: 85
DRG: 811 | End: 2020-08-16
Attending: STUDENT IN AN ORGANIZED HEALTH CARE EDUCATION/TRAINING PROGRAM
Payer: COMMERCIAL

## 2020-08-16 ENCOUNTER — APPOINTMENT (OUTPATIENT)
Dept: CT IMAGING | Facility: CLINIC | Age: 85
DRG: 811 | End: 2020-08-16
Attending: INTERNAL MEDICINE
Payer: COMMERCIAL

## 2020-08-16 LAB
ALBUMIN SERPL-MCNC: 2.3 G/DL (ref 3.4–5)
ALBUMIN UR-MCNC: NEGATIVE MG/DL
ALP SERPL-CCNC: 82 U/L (ref 40–150)
ALT SERPL W P-5'-P-CCNC: 8 U/L (ref 0–70)
ANION GAP SERPL CALCULATED.3IONS-SCNC: 1 MMOL/L (ref 3–14)
APPEARANCE UR: CLEAR
AST SERPL W P-5'-P-CCNC: 7 U/L (ref 0–45)
BILIRUB DIRECT SERPL-MCNC: 0.2 MG/DL (ref 0–0.2)
BILIRUB SERPL-MCNC: 0.5 MG/DL (ref 0.2–1.3)
BILIRUB UR QL STRIP: NEGATIVE
BUN SERPL-MCNC: 18 MG/DL (ref 7–30)
CALCIUM SERPL-MCNC: 8 MG/DL (ref 8.5–10.1)
CHLORIDE SERPL-SCNC: 110 MMOL/L (ref 94–109)
CO2 SERPL-SCNC: 32 MMOL/L (ref 20–32)
COLOR UR AUTO: YELLOW
CREAT SERPL-MCNC: 1.2 MG/DL (ref 0.66–1.25)
ERYTHROCYTE [DISTWIDTH] IN BLOOD BY AUTOMATED COUNT: 16.7 % (ref 10–15)
GFR SERPL CREATININE-BSD FRML MDRD: 54 ML/MIN/{1.73_M2}
GLUCOSE SERPL-MCNC: 104 MG/DL (ref 70–99)
GLUCOSE UR STRIP-MCNC: NEGATIVE MG/DL
HCT VFR BLD AUTO: 25.7 % (ref 40–53)
HGB BLD-MCNC: 7.5 G/DL (ref 13.3–17.7)
HGB UR QL STRIP: NEGATIVE
INR PPP: 1.3 (ref 0.86–1.14)
KETONES UR STRIP-MCNC: NEGATIVE MG/DL
LEUKOCYTE ESTERASE UR QL STRIP: NEGATIVE
MCH RBC QN AUTO: 23.7 PG (ref 26.5–33)
MCHC RBC AUTO-ENTMCNC: 29.2 G/DL (ref 31.5–36.5)
MCV RBC AUTO: 81 FL (ref 78–100)
NITRATE UR QL: NEGATIVE
PH UR STRIP: 7 PH (ref 5–7)
PLATELET # BLD AUTO: 272 10E9/L (ref 150–450)
POTASSIUM SERPL-SCNC: 3.8 MMOL/L (ref 3.4–5.3)
PROT SERPL-MCNC: 5.3 G/DL (ref 6.8–8.8)
RBC # BLD AUTO: 3.17 10E12/L (ref 4.4–5.9)
RBC #/AREA URNS AUTO: 0 /HPF (ref 0–2)
SODIUM SERPL-SCNC: 143 MMOL/L (ref 133–144)
SOURCE: NORMAL
SP GR UR STRIP: 1.01 (ref 1–1.03)
UROBILINOGEN UR STRIP-MCNC: NORMAL MG/DL (ref 0–2)
WBC # BLD AUTO: 9.5 10E9/L (ref 4–11)
WBC #/AREA URNS AUTO: <1 /HPF (ref 0–5)

## 2020-08-16 PROCEDURE — 12000000 ZZH R&B MED SURG/OB

## 2020-08-16 PROCEDURE — 97161 PT EVAL LOW COMPLEX 20 MIN: CPT | Mod: GP

## 2020-08-16 PROCEDURE — 99232 SBSQ HOSP IP/OBS MODERATE 35: CPT | Performed by: INTERNAL MEDICINE

## 2020-08-16 PROCEDURE — 25000132 ZZH RX MED GY IP 250 OP 250 PS 637: Performed by: STUDENT IN AN ORGANIZED HEALTH CARE EDUCATION/TRAINING PROGRAM

## 2020-08-16 PROCEDURE — 85027 COMPLETE CBC AUTOMATED: CPT | Performed by: STUDENT IN AN ORGANIZED HEALTH CARE EDUCATION/TRAINING PROGRAM

## 2020-08-16 PROCEDURE — 81001 URINALYSIS AUTO W/SCOPE: CPT | Performed by: STUDENT IN AN ORGANIZED HEALTH CARE EDUCATION/TRAINING PROGRAM

## 2020-08-16 PROCEDURE — 80048 BASIC METABOLIC PNL TOTAL CA: CPT | Performed by: STUDENT IN AN ORGANIZED HEALTH CARE EDUCATION/TRAINING PROGRAM

## 2020-08-16 PROCEDURE — 36415 COLL VENOUS BLD VENIPUNCTURE: CPT | Performed by: STUDENT IN AN ORGANIZED HEALTH CARE EDUCATION/TRAINING PROGRAM

## 2020-08-16 PROCEDURE — 85610 PROTHROMBIN TIME: CPT | Performed by: STUDENT IN AN ORGANIZED HEALTH CARE EDUCATION/TRAINING PROGRAM

## 2020-08-16 PROCEDURE — 99207 ZZC MOONLIGHTING INDICATOR: CPT | Performed by: INTERNAL MEDICINE

## 2020-08-16 PROCEDURE — 97110 THERAPEUTIC EXERCISES: CPT | Mod: GP

## 2020-08-16 PROCEDURE — 99207 ZZC CDG-MDM COMPONENT: MEETS LOW - DOWN CODED: CPT | Performed by: INTERNAL MEDICINE

## 2020-08-16 PROCEDURE — C9113 INJ PANTOPRAZOLE SODIUM, VIA: HCPCS | Performed by: STUDENT IN AN ORGANIZED HEALTH CARE EDUCATION/TRAINING PROGRAM

## 2020-08-16 PROCEDURE — 80076 HEPATIC FUNCTION PANEL: CPT | Performed by: STUDENT IN AN ORGANIZED HEALTH CARE EDUCATION/TRAINING PROGRAM

## 2020-08-16 PROCEDURE — 25000128 H RX IP 250 OP 636: Performed by: STUDENT IN AN ORGANIZED HEALTH CARE EDUCATION/TRAINING PROGRAM

## 2020-08-16 PROCEDURE — 74176 CT ABD & PELVIS W/O CONTRAST: CPT

## 2020-08-16 RX ADMIN — GABAPENTIN 900 MG: 300 CAPSULE ORAL at 20:25

## 2020-08-16 RX ADMIN — GABAPENTIN 900 MG: 300 CAPSULE ORAL at 00:14

## 2020-08-16 RX ADMIN — METOPROLOL SUCCINATE 12.5 MG: 25 TABLET, EXTENDED RELEASE ORAL at 09:30

## 2020-08-16 RX ADMIN — PANTOPRAZOLE SODIUM 40 MG: 40 INJECTION, POWDER, FOR SOLUTION INTRAVENOUS at 20:25

## 2020-08-16 RX ADMIN — PANTOPRAZOLE SODIUM 40 MG: 40 INJECTION, POWDER, FOR SOLUTION INTRAVENOUS at 00:16

## 2020-08-16 RX ADMIN — ATORVASTATIN CALCIUM 10 MG: 10 TABLET, FILM COATED ORAL at 09:31

## 2020-08-16 RX ADMIN — UMECLIDINIUM 1 PUFF: 62.5 AEROSOL, POWDER ORAL at 09:31

## 2020-08-16 RX ADMIN — PANTOPRAZOLE SODIUM 40 MG: 40 INJECTION, POWDER, FOR SOLUTION INTRAVENOUS at 09:31

## 2020-08-16 ASSESSMENT — ACTIVITIES OF DAILY LIVING (ADL)
ADLS_ACUITY_SCORE: 17

## 2020-08-16 NOTE — CONSULTS
Consult Date:  08/16/2020      An 87-year-old man who we are asked to see because of anemia by history.  He has known prior AVMs in the intestinal tract, as well as coronary artery disease, paroxysmal A-fib, hyperlipidemia, high blood pressure, as well as prior lung cancer, having had surgery in the past.  He had last been seen through our office in the hospital 2 years ago when endoscopy had revealed evidence of AVMs, both in the upper GI tract, as well as lower GI tract.  At that time, it was felt that he was not an ideal candidate for further colonoscopy.      He states he had been doing reasonably well, but was readmitted this time with increasing weakness.  He was found to have a hemoglobin down to 5.9 and with this, we are seeing him.      He relates that he was evaluated in [place].  An upper endoscopy performed 1 month ago showed no abnormality.      MEDICATIONS:  He is taking Eliquis.  He is also on Tylenol, atorvastatin, famotidine, iron, hydroxyzine, metoprolol, pantoprazole, inhalers, gabapentin.      PAST MEDICAL HISTORY:  Reviewed.  Medical problems include paroxysmal A-fib, DVT, reflux, monoclonal gammopathy, neuropathy, lymphoma.      PRIOR SURGERIES:  Include aortic valve replacement, thoracotomy for lung cancer, several orthopedic procedures, ventral hernia repair, back surgery, and appendectomy.        SOCIAL HISTORY:  The patient is not a smoker or drinker, having quit in the past.      FAMILY HISTORY:  Noncontributory.      REVIEW OF SYSTEMS:  He had had increasing shortness of breath.  No fevers or chills.      PHYSICAL EXAMINATION:   GENERAL:  He is a pleasant man, appearing his stated 87 years of age.     SKIN:  He has pallor, no jaundice.   HEENT:  Pharynx is clear.   NECK:  There is no adenopathy in the neck.   LUNGS:  Occasional wheeze bilaterally.   HEART:  S1, S2, slightly irregular.  There is a slight murmur.   ABDOMEN:  Flat, soft, nontender.  His liver is enlarged, felt a few  centimeters with inspiration below the right costal margin.  He does not have liver stigmata.   EXTREMITIES:  No peripheral edema.   NEUROLOGIC:  He is oriented to person, place and time.      LABORATORY DATA:  From admission, his electrolytes were intact.  Creatinine was 1.7.  LFTs were normal.  Albumin was 2.9, total protein was 6.7.  BNP was elevated at 1593.  His hemoglobin is 7.4, MCV is 80.      IMPRESSION:  This is a patient with multiple medical problems, including lung cancer, question monoclonal gammopathy, question lymphoma, who has had arteriovenous malformations throughout the GI tract in the past.  At this point, the etiology for his anemia seems multifactorial and he has not had overt clinical bleeding.      Still concern regarding the gammopathy.  Also of note is that his liver is increased on exam and it is noted that his PSA is elevated as well.        At the present time, he is not an ideal candidate for endoscopy in that he still has some wheezes and it may be safer to proceed with this tomorrow.      I would recommend a CAT scan today because of the liver enlargement, looking for evidence of possible neoplasia.  He can have clear liquids and we can decide on endoscopy in the morning.      Ultimately, the risks of ongoing anticoagulation may be higher than the benefits to him, given the recurrent anemia.  This, however, would be a question that would have to be answered in conjunction with Cardiology, given the valve and atrial arrhythmia.      These findings will be discussed with the Hospitalist Service.  We appreciate assisting in this gentleman's care.         BROOKLYN WORTHINGTON MD             D: 2020   T: 2020   MT: RAMIREZ      Name:     CLYDE RODRIGUEZ   MRN:      [id number]        Account:       [id number]   :      1932           Consult Date:  2020      Document: [id number]

## 2020-08-16 NOTE — PLAN OF CARE
DATE & TIME: 08/16/2020 0831-1009   Cognitive Concerns/ Orientation : Alert/Oriented x 4. Hard of Hearing. Dentures. Glasses at bedside.  BEHAVIOR & AGGRESSION TOOL COLOR: green  CIWA SCORE: n/a   ABNL VS/O2: VSS, room air  MOBILITY: Assist-1 cane, gait belt  PAIN MANAGMENT: Denies this shift  DIET: clear liquids. NPO at midnight  BOWEL/BLADDER: Using urinal at bedside. No BM this shift  ABNL LAB/BG: Hgb 7.5, hematocrit 25.7, INR 1.3, GFR 54.  DRAIN/DEVICES: L PIV SL, R PIV SL  TELEMETRY RHYTHM: n/a  SKIN: pale, bruised, scabs  TESTS/PROCEDURES: GI consulted. CT of abdomen completed today. EGD on Monday 8/17.  D/C DAY/GOALS/PLACE: Pending Hgb stability  OTHER IMPORTANT INFO: Eliquis held.   MD/RN ROUNDING SIGNED OFF D/E SHIFT: No  COMMIT TO SIT DONE AND SIGNED OFF YES

## 2020-08-16 NOTE — PROGRESS NOTES
08/16/20 1500   Quick Adds   Type of Visit Initial PT Evaluation   Living Environment   Lives With spouse   Living Arrangements house   Home Accessibility stairs to enter home   Number of Stairs, Main Entrance 1   Stair Railings, Main Entrance railing on right side (ascending)   Self-Care   Usual Activity Tolerance moderate   Current Activity Tolerance moderate   Equipment Currently Used at Home cane, straight;walker, rolling   Activity/Exercise/Self-Care Comment uses can primarily   Functional Level Prior   Ambulation 1-->assistive equipment   Transferring 1-->assistive equipment   Toileting 0-->independent   Bathing 0-->independent   Fall history within last six months no   Which of the above functional risks had a recent onset or change? ambulation   General Information   Onset of Illness/Injury or Date of Surgery - Date 08/15/20   Referring Physician Quintin Fajardo MD    Patient/Family Goals Statement home with his wife. discussed option of possible pt in OP or HH setting after hospital and he declines any therapy ouside of hospital stay   Pertinent History of Current Problem (include personal factors and/or comorbidities that impact the POC) Pt presented to [facility] with generalized weakness and low Hgb.  He has history of COPD and peripheral neuropathy. Other PMH t oinclude:  prior AVMs in the intestinal tract, as well as coronary artery disease, paroxysmal A-fib, hyperlipidemia, high blood pressure, as well as prior lung cancer, having had surgery in the past.    Precautions/Limitations fall precautions   General Observations slightly [place]   Cognitive Status Examination   Orientation orientation to person, place and time   Level of Consciousness alert   Follows Commands and Answers Questions 100% of the time   Personal Safety and Judgment intact  (demonstrated appropraite safety during assessment today)   Memory intact   Pain Assessment   Patient Currently in Pain No   Integumentary/Edema    Integumentary/Edema no deficits were identifed   Posture    Posture Forward head position   Range of Motion (ROM)   ROM Comment WNL BLE/BUEs   Strength   Strength Comments MMT 4+/5 grossly BLEs   Bed Mobility   Bed Mobility Comments IND supine<>sit flat bed   Transfer Skills   Transfer Comments SBA with cane from chair and EOB   Gait   Gait Comments CGA with straight cane 100ft with unsteady gait, but likely unsteady at baseline due to neuropathy   Balance   Balance Comments decreased stability in standing requiring AD for safety   Sensory Examination   Sensory Perception Comments neuropathy in both feet   General Therapy Interventions   Planned Therapy Interventions gait training;strengthening   Clinical Impression   Criteria for Skilled Therapeutic Intervention yes, treatment indicated   PT Diagnosis impaired gait   Influenced by the following impairments functional weakness, impaired balance, neuropathy   Functional limitations due to impairments decreased IND and safety with gait   Clinical Presentation Stable/Uncomplicated   Clinical Decision Making (Complexity) Low complexity   Therapy Frequency Daily   Predicted Duration of Therapy Intervention (days/wks) 3days   Anticipated Discharge Disposition Home with Assist  (from wife)   Risk & Benefits of therapy have been explained Yes   Patient, Family & other staff in agreement with plan of care Yes   Total Evaluation Time   Total Evaluation Time (Minutes) 15

## 2020-08-16 NOTE — PLAN OF CARE
DATE & TIME: 08/16/2020 8222-4012  Cognitive Concerns/ Orientation : Alert/Oriented x 4. [place]. Dentures and glasses at bedside.  BEHAVIOR & AGGRESSION TOOL COLOR: Green  CIWA SCORE: n/a   ABNL VS/O2: VSS on room air  MOBILITY: Assistx1 cane, gait belt  PAIN MANAGMENT: Denies  DIET: Clear liquids. NPO at midnight  BOWEL/BLADDER: Using urinal at bedside. No BM this shift  ABNL LAB/BG: Hgb 7.5, hematocrit 25.7, INR 1.3, GFR 54.  DRAIN/DEVICES: L PIV SL, R PIV SL  TELEMETRY RHYTHM: n/a  SKIN: pale, bruised, scabs  TESTS/PROCEDURES: GI consulted. CT of abdomen completed today. EGD on Monday 8/17.  D/C DAY/GOALS/PLACE: Pending Hgb stability  OTHER IMPORTANT INFO: Eliquis held. Transfuse if Hgb <7. NPO at midnight for EGD 8/17.   COMMIT TO SIT DONE AND SIGNED OFF: YES

## 2020-08-16 NOTE — PROVIDER NOTIFICATION
MD Notification    Notified Person: MD    Notified Person Name: Rebel    Notification Date/Time: 8/15/20 10:38 pm    Notification Interaction: Page    Purpose of Notification: Lab called, need new urine sample order if want one. Can't use order from ED.     Orders Received:    Comments:

## 2020-08-16 NOTE — PHARMACY-ADMISSION MEDICATION HISTORY
Pharmacy Medication History  Admission medication history interview status for the 8/15/2020  admission is complete. See EPIC admission navigator for prior to admission medications     Medication history sources: Patient and sure scripts   Medication history source reliability: Moderate  Adherence assessment: Moderate    Significant changes made to the medication list:  Discontinued: omeprazole, torsemide, tamsulosin   New:  Changed: gabapentin         Additional medication history information:       Medication reconciliation completed by provider prior to medication history? Yes    Time spent in this activity: 30min       Prior to Admission medications    Medication Sig Last Dose Taking? Auth Provider   acetaminophen (TYLENOL) 325 MG tablet Take 2 tablets by mouth every 4 hours as needed for mild pain  Past Month at Unknown time Yes Reported, Patient   albuterol (PROAIR HFA/PROVENTIL HFA/VENTOLIN HFA) 108 (90 Base) MCG/ACT Inhaler Inhale 1-2 puffs into the lungs every 6 hours as needed for shortness of breath / dyspnea or wheezing Past Month at Unknown time Yes Karson Bishop MD   apixaban ANTICOAGULANT (ELIQUIS ANTICOAGULANT) 5 MG tablet Take 1 tablet (5 mg) by mouth 2 times daily 8/15/2020 at Unknown time Yes Jamie Ospina MD   atorvastatin (LIPITOR) 10 MG tablet Take 1 tablet (10 mg) by mouth daily 8/15/2020 at Unknown time Yes Tab Grijalva MD   famotidine (PEPCID) 40 MG tablet Take 1 tablet (40 mg) by mouth 2 times daily 8/15/2020 at Unknown time Yes Willy Harvey MD   famotidine (PEPCID) 40 MG tablet Take 1 tablet (40 mg) by mouth 2 times daily as needed (heartburn) Past Month at Unknown time Yes Karson Bishop MD   ferrous sulfate (FE TABS) 325 (65 Fe) MG EC tablet Take 1 tablet (325 mg) by mouth three times a week 8/15/2020 at Unknown time Yes Catalina Leahy PA-C   hydrOXYzine (ATARAX) 25 MG tablet Take 50 mg by mouth At Bedtime  Yes Unknown, Entered By History   metoprolol succinate ER  (TOPROL-XL) 25 MG 24 hr tablet Take 0.5 tablets (12.5 mg) by mouth daily 8/15/2020 at Unknown time Yes Charlie Muñiz, DO   pantoprazole (PROTONIX) 40 MG EC tablet Take 1 tablet (40 mg) by mouth 2 times daily (before meals) 8/15/2020 at Unknown time Yes Charlie Muñiz, DO   tiotropium (SPIRIVA HANDIHALER) 18 MCG capsule Inhale 1 capsule (18 mcg) into the lungs daily 8/15/2020 at Unknown time Yes Karson Bishop MD   triamcinolone (KENALOG) 0.1 % external cream Apply topically 2 times daily as needed for irritation Past Month at Unknown time Yes Karson Bishop MD   gabapentin (NEURONTIN) 300 MG capsule Take 900 mg by mouth At Bedtime   Unknown, Entered By History   hydrOXYzine (ATARAX) 25 MG tablet Take 2 tablets (50 mg) by mouth every 6 hours as needed   Karson Bishop MD   order for DME Equipment being ordered: Right neoprene knee brace.  ( Fax to [facility] fax  [phone])   Karson Bishop MD Marci Jacobson PharmD

## 2020-08-16 NOTE — PROVIDER NOTIFICATION
MD Notification    Notified Person: MD    Notified Person Name: Dr. Rudd (GI)    Notification Date/Time: 8/16/2020 8350    Notification Interaction: telephone call to pager number    Purpose of Notification: GFR was trending down yesterday, CT called before today's labs resulted and asked if Dr. Rudd still wants a CT with contrast. Lab came and the GFR today was 54. Notified Dr. Rudd about the result still being below normal range.     Orders Received: Dr. Rudd said to do the CT without contrast.     Comments: Notified CT to do the CT without contrast. CT said they will change the order, and they did.

## 2020-08-16 NOTE — PROGRESS NOTES
Admission    Patient arrives to room 615-2 via cart from ED.  Care plan note: Concern for GI bleed, low Hgb    Inpatient nursing criteria listed below were met:    PCD's Documented: Yes  Skin issues/needs documented :Yes  Isolation education started/completed NA  Patient allergies verified with patient: Yes  Verified completion of [place] Risk Assessment Tool:  Yes  Verified completion of Guardianship screening tool: Yes  Fall Prevention: Care plan updated, Education given and documented Yes  Care Plan initiated: Yes  Home medications documented in belongings flowsheet: NA  Patient belongings documented in belongings flowsheet: Yes  Reminder note (belongings/ medications) placed in discharge instructions:NA  Admission profile/ required documentation complete: Yes  Bedside Report Letter given and explained to patient Yes

## 2020-08-16 NOTE — CONSULTS
GI dictated    Complicated 88 yo with OHD(AS with valve), ? 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 Admitted with anemia.. No clinical bleeding but suspect GI as well as other causes. P/E now with wheezes and liver enlargement. Would ideally--> EGD but defer to Monday as safer with anesthesia back up. Plz keep on clears and NPO at [place]. Will check CT with increase liver(not noted in past)      Francisco Javier Rudd MD  [facility]   Office: [phone]   Pager: [phone]  Monday-Thursday 8am to 5pm, Fridays 8am to 4pm

## 2020-08-16 NOTE — PROGRESS NOTES
DATE & TIME: 08/15/2020 Night    Cognitive Concerns/ Orientation : Alert/Oriented x 4. Hard of Hearing. Dentures at bedside   BEHAVIOR & AGGRESSION TOOL COLOR: green  CIWA SCORE: n/a   ABNL VS/O2: VSS, room air  MOBILITY: Assist-1 cane, gait belt  PAIN MANAGMENT: Denies this shift  DIET: NPO since midnight  BOWEL/BLADDER: Using urinal at bedside. No BM this shift  ABNL LAB/BG: Potassium 3.4 (replaced in ED); Hgb 7.4 (Replaced in ED-recheck in am); Hematocrit 24.9  DRAIN/DEVICES: L PIV infusing Lactated Ringers at 75 mL/hour; R PIV saline locked  TELEMETRY RHYTHM: n/a  SKIN: pale, bruised, scabs  TESTS/PROCEDURES: Urine sample sent to lab; GI consult   D/C DAY/GOALS/PLACE: Pending Hgb stability  OTHER IMPORTANT INFO: Eliquis held.   MD/RN ROUNDING SIGNED OFF D/E SHIFT: n/a  COMMIT TO SIT DONE AND SIGNED OFF YES

## 2020-08-16 NOTE — PROGRESS NOTES
[facility]    Hospitalist Progress Note    Assessment & Plan   Hermilo Velasquez is a 87 year old male admitted on 8/15/2020. He presents with generalized weakness.      Acute Blood Loss Anemia  History of gastroesophageal reflux disease, gastric and duodenal angiectasias   Assessment: Presents with increasing generalized weakness and fatigue, his hemoglobin roughly 1 month prior to mission was 9.8, on admission it is currently at 7.4.  No direct endorsements of melena or bright red blood per rectum, but do worry that given his history of AVMs, gastric and duodenal angiectasia's, and he is anticoagulated that he is having a possible acute GI bleed.  He is otherwise hemodynamically stable, overall nontoxic-appearing.  Plan:  - Follow vitals/temp  - [facility] consult - Would ideally--> EGD but defer to Monday as safer with anesthesia back up. Will keep on clears and NPO at [place]. Ordered CT of abdomen.   - hold PTA Eliquis  - Transfuse hgb < 7.0  - N.p.o. at midnight     Hx of RLL PE  Assessment: PTA on Eliquis  Plan:  - hold eliquis.      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 On admission Cr 1.71, suspect this is pre-renal in etiology.   Plan:   - IVF  - Follow Cr/electrolytes     CAD  Patient denies any recent chest pain, palpitations, or shortness of breath. He reports compliance with his home medications.   Plan:  -Continue bid metoprolol at 12.5 mg BID     Paroxysmal atrial fibrillation on Eliquis   Assessment/Plan: continue metoprolol and hold Eliquis     Hyperlipidemia: Continue PTA statin     Chronic pruritus :PTA Atarax       History of lung cancer and NHL in remission      Bullous pemphigoid  Assessment/Plan: stable.     Jules Bowden MD   Text Page (7am to 6pm)    Interval History   Plan for EGD likely tomm. CT scan ordered of abdomen. Seen by GI. Unchanged  symptoms.     -Data reviewed today: I reviewed all new labs and imaging results over the last 24 hours. I personally reviewed no images or EKG's today.    Physical Exam   Temp: 98.4  F (36.9  C) Temp src: Oral BP: 110/61 Pulse: 87 Heart Rate: 86 Resp: 18 SpO2: 90 % O2 Device: None (Room air)    Vitals:    08/16/20 0527   Weight: 75.7 kg (166 lb 14.2 oz)     Vital Signs with Ranges  Temp:  [97.2  F (36.2  C)-98.4  F (36.9  C)] 98.4  F (36.9  C)  Pulse:  [73-92] 87  Heart Rate:  [82-95] 86  Resp:  [16-18] 18  BP: (101-135)/(54-74) 110/61  SpO2:  [77 %-96 %] 90 %  I/O last 3 completed shifts:  In: 2133 [I.V.:833; IV Piggyback:1000]  Out: 750 [Urine:750]    Constitutional: awake, alert, cooperative, no apparent distress.   Eyes: Lids and lashes normal, pupils equal, round and reactive to light   ENT: Normocephalic, without obvious abnormality, atraumatic, sinuses nontender on palpation   Hematologic / Lymphatic: no cervical lymphadenopathy   Respiratory: CTABL   Cardiovascular: RRR with no m/r/g   GI: Normal bowel sounds, soft, non-distended, non-tender. Skin: normal skin color, texture, turgor   Musculoskeletal: There is no redness, warmth, or swelling of the joints. Full range of motion noted.   Neurologic: Awake, alert, oriented to name, place and time. Cranial nerves II-XII are grossly intact. Motor is 5 out of 5 bilaterally. Sensory is intact.   Neuropsychiatric: normal mood and affect    Medications       atorvastatin  10 mg Oral Daily     gabapentin  900 mg Oral At Bedtime     metoprolol succinate ER  12.5 mg Oral Daily     pantoprazole (PROTONIX) IV  40 mg Intravenous BID     sodium chloride (PF)  3 mL Intracatheter Q8H     umeclidinium  1 puff Inhalation Daily       Data   Recent Labs   Lab 08/16/20  0829 08/15/20  2109 08/15/20  1450 08/11/20  1141   WBC 9.5  --  9.2 7.5   HGB 7.5*  --  7.4* 7.4*   MCV 81  --  80 83     --  294 255   INR 1.30*  --   --   --      --  139 140   POTASSIUM 3.8 3.4  3.0* 4.4   CHLORIDE 110*  --  101 108   CO2 32  --  34* 24   BUN 18  --  28 18   CR 1.20  --  1.71* 1.17   ANIONGAP 1*  --  4 8   AMRITA 8.0*  --  8.2* 8.7   *  --  133* 98   ALBUMIN 2.3*  --  2.9*  --    PROTTOTAL 5.3*  --  6.7*  --    BILITOTAL 0.5  --  0.4  --    ALKPHOS 82  --  100  --    ALT 8  --  10  --    AST 7  --  7  --    TROPI  --   --  <0.015  --        Imaging:   Recent Results (from the past 24 hour(s))   XR Chest 2 Views    Narrative    CHEST TWO VIEWS 8/15/2020 3:10 PM     HISTORY: Weakness.    COMPARISON: 3/26/2020       Impression    IMPRESSION: Minimal pleural fluid or pleural thickening on the right.  Stable minimal interstitial changes. Left lung clear. The cardiac  silhouette is not enlarged. Pulmonary vasculature is unremarkable.    FUENTES SIMON MD

## 2020-08-16 NOTE — PLAN OF CARE
Summary:     DATE & TIME: 8/15/20 4018-1417  Cognitive Concerns/ Orientation : A&Ox4   BEHAVIOR & AGGRESSION TOOL COLOR: Green  CIWA SCORE: NA   ABNL VS/O2: VSS on room air  MOBILITY: Ax1, GB, cane  PAIN MANAGMENT: Denies  DIET: Clears, NPO at midnight   BOWEL/BLADDER: Continent, up to BR and urinal at bedside   ABNL LAB/BG: K 3.0, replaced in ED, recheck 3.4; Hgb 7.4, 1 unit PRBS given; Pro BNP 1593; Cr 1.71; WBC 9.2  DRAIN/DEVICES: One PIV SL, other infusing LR @ 75 ml/hr  TELEMETRY RHYTHM: NA  SKIN: Bruised, scabs  TESTS/PROCEDURES: Might need a urine sample?  D/C DAY/GOALS/PLACE: 2-3 days once Hgb stable   OTHER IMPORTANT INFO: [facility] consulted, denies any melena or bight red blood in stool, will continue to monitor stool given history, Eliquis held. NPO at midnight.

## 2020-08-16 NOTE — PLAN OF CARE
Discharge Planner PT   Patient plan for discharge: home with wife  Current status: Chart reviewed and eval completed with treatment initiated.  Pt presented with generalized weakness and low Hgb.  He reports living with his wife in a home with 1 step to enter and no steps inside the home.  He has a cane and a walker, but primarily uses the cane.  He has history of neuropathy in both feet and reports impaired sensation which appears to affect his baseline balance/stability with gait, but denies any recent falling.  Pt is IND with bed mobility, SBA for sit<>stand transfers, and CGA during ambulation with a cane.  1 episode of knee buckling and unsteady gait noted throughout.  He has adequate MMT, but appears to have decreased functional strength limiting his safety with mobility along with his balance deficits.  Pt instructed in seated LE strengthening exercises completing about 15reps each (see flowsheet) and was encouraged to try them again later tonight.  Barriers to return to prior living situation: fall risk, stair to enter not yet assessed.  Recommendations for discharge: home with wife  Rationale for recommendations: Pt has unsteady gait but may not be far off baseline therefore would be able to return home.  No fall history despite unsteady gait.  He will benefit from continued skilled PT in the IP setting to improve safety with mobility and strengthen with HEP to promote safety in the home.       Entered by: Crissy Hampton 08/16/2020 4:37 PM

## 2020-08-17 ENCOUNTER — ANESTHESIA (OUTPATIENT)
Dept: GASTROENTEROLOGY | Facility: CLINIC | Age: 85
DRG: 811 | End: 2020-08-17
Payer: COMMERCIAL

## 2020-08-17 ENCOUNTER — APPOINTMENT (OUTPATIENT)
Dept: ULTRASOUND IMAGING | Facility: CLINIC | Age: 85
DRG: 811 | End: 2020-08-17
Attending: INTERNAL MEDICINE
Payer: COMMERCIAL

## 2020-08-17 ENCOUNTER — ANESTHESIA EVENT (OUTPATIENT)
Dept: GASTROENTEROLOGY | Facility: CLINIC | Age: 85
DRG: 811 | End: 2020-08-17
Payer: COMMERCIAL

## 2020-08-17 LAB
ANION GAP SERPL CALCULATED.3IONS-SCNC: 1 MMOL/L (ref 3–14)
BUN SERPL-MCNC: 12 MG/DL (ref 7–30)
CALCIUM SERPL-MCNC: 8.3 MG/DL (ref 8.5–10.1)
CHLORIDE SERPL-SCNC: 108 MMOL/L (ref 94–109)
CO2 SERPL-SCNC: 31 MMOL/L (ref 20–32)
CREAT SERPL-MCNC: 1.09 MG/DL (ref 0.66–1.25)
ERYTHROCYTE [DISTWIDTH] IN BLOOD BY AUTOMATED COUNT: 17 % (ref 10–15)
GFR SERPL CREATININE-BSD FRML MDRD: 60 ML/MIN/{1.73_M2}
GLUCOSE SERPL-MCNC: 92 MG/DL (ref 70–99)
HCT VFR BLD AUTO: 26.8 % (ref 40–53)
HGB BLD-MCNC: 7.7 G/DL (ref 13.3–17.7)
INR PPP: 1.21 (ref 0.86–1.14)
MCH RBC QN AUTO: 23.5 PG (ref 26.5–33)
MCHC RBC AUTO-ENTMCNC: 28.7 G/DL (ref 31.5–36.5)
MCV RBC AUTO: 82 FL (ref 78–100)
PLATELET # BLD AUTO: 273 10E9/L (ref 150–450)
POTASSIUM SERPL-SCNC: 3.9 MMOL/L (ref 3.4–5.3)
RBC # BLD AUTO: 3.28 10E12/L (ref 4.4–5.9)
SARS-COV-2 RNA SPEC QL NAA+PROBE: NOT DETECTED
SODIUM SERPL-SCNC: 140 MMOL/L (ref 133–144)
SPECIMEN SOURCE: NORMAL
UPPER GI ENDOSCOPY: NORMAL
WBC # BLD AUTO: 8.9 10E9/L (ref 4–11)

## 2020-08-17 PROCEDURE — 99233 SBSQ HOSP IP/OBS HIGH 50: CPT | Performed by: HOSPITALIST

## 2020-08-17 PROCEDURE — 25000128 H RX IP 250 OP 636: Performed by: HOSPITALIST

## 2020-08-17 PROCEDURE — 40000010 ZZH STATISTIC ANES STAT CODE-CRNA PER MINUTE: Performed by: INTERNAL MEDICINE

## 2020-08-17 PROCEDURE — 36415 COLL VENOUS BLD VENIPUNCTURE: CPT | Performed by: NURSE PRACTITIONER

## 2020-08-17 PROCEDURE — 99221 1ST HOSP IP/OBS SF/LOW 40: CPT | Performed by: INTERNAL MEDICINE

## 2020-08-17 PROCEDURE — C9113 INJ PANTOPRAZOLE SODIUM, VIA: HCPCS | Performed by: STUDENT IN AN ORGANIZED HEALTH CARE EDUCATION/TRAINING PROGRAM

## 2020-08-17 PROCEDURE — 25800030 ZZH RX IP 258 OP 636: Performed by: NURSE ANESTHETIST, CERTIFIED REGISTERED

## 2020-08-17 PROCEDURE — 85027 COMPLETE CBC AUTOMATED: CPT | Performed by: NURSE PRACTITIONER

## 2020-08-17 PROCEDURE — 12000000 ZZH R&B MED SURG/OB

## 2020-08-17 PROCEDURE — 25800030 ZZH RX IP 258 OP 636: Performed by: HOSPITALIST

## 2020-08-17 PROCEDURE — 80048 BASIC METABOLIC PNL TOTAL CA: CPT | Performed by: NURSE PRACTITIONER

## 2020-08-17 PROCEDURE — 25000132 ZZH RX MED GY IP 250 OP 250 PS 637: Performed by: STUDENT IN AN ORGANIZED HEALTH CARE EDUCATION/TRAINING PROGRAM

## 2020-08-17 PROCEDURE — 43235 EGD DIAGNOSTIC BRUSH WASH: CPT | Performed by: INTERNAL MEDICINE

## 2020-08-17 PROCEDURE — 85610 PROTHROMBIN TIME: CPT | Performed by: NURSE PRACTITIONER

## 2020-08-17 PROCEDURE — 25000128 H RX IP 250 OP 636: Performed by: NURSE ANESTHETIST, CERTIFIED REGISTERED

## 2020-08-17 PROCEDURE — 0DJ08ZZ INSPECTION OF UPPER INTESTINAL TRACT, VIA NATURAL OR ARTIFICIAL OPENING ENDOSCOPIC: ICD-10-PCS | Performed by: INTERNAL MEDICINE

## 2020-08-17 PROCEDURE — 37000008 ZZH ANESTHESIA TECHNICAL FEE, 1ST 30 MIN: Performed by: INTERNAL MEDICINE

## 2020-08-17 PROCEDURE — 25000128 H RX IP 250 OP 636: Performed by: STUDENT IN AN ORGANIZED HEALTH CARE EDUCATION/TRAINING PROGRAM

## 2020-08-17 PROCEDURE — 93970 EXTREMITY STUDY: CPT

## 2020-08-17 RX ORDER — FLUMAZENIL 0.1 MG/ML
0.2 INJECTION, SOLUTION INTRAVENOUS
Status: ACTIVE | OUTPATIENT
Start: 2020-08-17 | End: 2020-08-18

## 2020-08-17 RX ORDER — NALOXONE HYDROCHLORIDE 0.4 MG/ML
.1-.4 INJECTION, SOLUTION INTRAMUSCULAR; INTRAVENOUS; SUBCUTANEOUS
Status: CANCELLED | OUTPATIENT
Start: 2020-08-17 | End: 2020-08-18

## 2020-08-17 RX ORDER — SODIUM CHLORIDE, SODIUM LACTATE, POTASSIUM CHLORIDE, CALCIUM CHLORIDE 600; 310; 30; 20 MG/100ML; MG/100ML; MG/100ML; MG/100ML
INJECTION, SOLUTION INTRAVENOUS CONTINUOUS PRN
Status: DISCONTINUED | OUTPATIENT
Start: 2020-08-17 | End: 2020-08-17

## 2020-08-17 RX ORDER — LIDOCAINE HYDROCHLORIDE 20 MG/ML
INJECTION, SOLUTION INFILTRATION; PERINEURAL PRN
Status: DISCONTINUED | OUTPATIENT
Start: 2020-08-17 | End: 2020-08-17

## 2020-08-17 RX ORDER — DEXAMETHASONE SODIUM PHOSPHATE 4 MG/ML
4 INJECTION, SOLUTION INTRA-ARTICULAR; INTRALESIONAL; INTRAMUSCULAR; INTRAVENOUS; SOFT TISSUE
Status: CANCELLED | OUTPATIENT
Start: 2020-08-17

## 2020-08-17 RX ORDER — ONDANSETRON 4 MG/1
4 TABLET, ORALLY DISINTEGRATING ORAL EVERY 30 MIN PRN
Status: CANCELLED | OUTPATIENT
Start: 2020-08-17

## 2020-08-17 RX ORDER — FENTANYL CITRATE 50 UG/ML
25-50 INJECTION, SOLUTION INTRAMUSCULAR; INTRAVENOUS
Status: CANCELLED | OUTPATIENT
Start: 2020-08-17

## 2020-08-17 RX ORDER — NALOXONE HYDROCHLORIDE 0.4 MG/ML
.1-.4 INJECTION, SOLUTION INTRAMUSCULAR; INTRAVENOUS; SUBCUTANEOUS
Status: ACTIVE | OUTPATIENT
Start: 2020-08-17 | End: 2020-08-18

## 2020-08-17 RX ORDER — ONDANSETRON 2 MG/ML
4 INJECTION INTRAMUSCULAR; INTRAVENOUS EVERY 30 MIN PRN
Status: CANCELLED | OUTPATIENT
Start: 2020-08-17

## 2020-08-17 RX ORDER — SODIUM CHLORIDE, SODIUM LACTATE, POTASSIUM CHLORIDE, CALCIUM CHLORIDE 600; 310; 30; 20 MG/100ML; MG/100ML; MG/100ML; MG/100ML
INJECTION, SOLUTION INTRAVENOUS CONTINUOUS
Status: CANCELLED | OUTPATIENT
Start: 2020-08-17

## 2020-08-17 RX ORDER — PROPOFOL 10 MG/ML
INJECTION, EMULSION INTRAVENOUS CONTINUOUS PRN
Status: DISCONTINUED | OUTPATIENT
Start: 2020-08-17 | End: 2020-08-17

## 2020-08-17 RX ORDER — HYDROMORPHONE HYDROCHLORIDE 1 MG/ML
.3-.5 INJECTION, SOLUTION INTRAMUSCULAR; INTRAVENOUS; SUBCUTANEOUS EVERY 5 MIN PRN
Status: CANCELLED | OUTPATIENT
Start: 2020-08-17

## 2020-08-17 RX ORDER — PROPOFOL 10 MG/ML
INJECTION, EMULSION INTRAVENOUS PRN
Status: DISCONTINUED | OUTPATIENT
Start: 2020-08-17 | End: 2020-08-17

## 2020-08-17 RX ORDER — ALBUTEROL SULFATE 0.83 MG/ML
2.5 SOLUTION RESPIRATORY (INHALATION) EVERY 6 HOURS PRN
Status: DISCONTINUED | OUTPATIENT
Start: 2020-08-17 | End: 2020-08-18 | Stop reason: HOSPADM

## 2020-08-17 RX ORDER — SODIUM CHLORIDE 9 MG/ML
INJECTION, SOLUTION INTRAVENOUS CONTINUOUS
Status: DISCONTINUED | OUTPATIENT
Start: 2020-08-17 | End: 2020-08-17

## 2020-08-17 RX ADMIN — PANTOPRAZOLE SODIUM 40 MG: 40 INJECTION, POWDER, FOR SOLUTION INTRAVENOUS at 21:11

## 2020-08-17 RX ADMIN — PROPOFOL 125 MCG/KG/MIN: 10 INJECTION, EMULSION INTRAVENOUS at 12:38

## 2020-08-17 RX ADMIN — ATORVASTATIN CALCIUM 10 MG: 10 TABLET, FILM COATED ORAL at 08:32

## 2020-08-17 RX ADMIN — SODIUM CHLORIDE: 9 INJECTION, SOLUTION INTRAVENOUS at 08:35

## 2020-08-17 RX ADMIN — UMECLIDINIUM 1 PUFF: 62.5 AEROSOL, POWDER ORAL at 09:22

## 2020-08-17 RX ADMIN — PANTOPRAZOLE SODIUM 40 MG: 40 INJECTION, POWDER, FOR SOLUTION INTRAVENOUS at 08:32

## 2020-08-17 RX ADMIN — SODIUM CHLORIDE, POTASSIUM CHLORIDE, SODIUM LACTATE AND CALCIUM CHLORIDE: 600; 310; 30; 20 INJECTION, SOLUTION INTRAVENOUS at 12:34

## 2020-08-17 RX ADMIN — METOPROLOL SUCCINATE 12.5 MG: 25 TABLET, EXTENDED RELEASE ORAL at 08:32

## 2020-08-17 RX ADMIN — IRON SUCROSE 300 MG: 20 INJECTION, SOLUTION INTRAVENOUS at 15:22

## 2020-08-17 RX ADMIN — GABAPENTIN 900 MG: 300 CAPSULE ORAL at 21:11

## 2020-08-17 RX ADMIN — PROPOFOL 20 MG: 10 INJECTION, EMULSION INTRAVENOUS at 12:38

## 2020-08-17 ASSESSMENT — COPD QUESTIONNAIRES: COPD: 1

## 2020-08-17 ASSESSMENT — ACTIVITIES OF DAILY LIVING (ADL)
ADLS_ACUITY_SCORE: 17

## 2020-08-17 ASSESSMENT — LIFESTYLE VARIABLES: TOBACCO_USE: 0

## 2020-08-17 ASSESSMENT — ENCOUNTER SYMPTOMS: DYSRHYTHMIAS: 1

## 2020-08-17 NOTE — PROVIDER NOTIFICATION
MD Notification    Notified Person: MD Johnson    Notified Person Name:    Notification Date/Time: 8/17/2020 0756    Notification Interaction: text page     Purpose of Notification: Good morning Dr pt is NPO and does not have any peripheral fluids infusing. Is that something you would like him to have to maintain hydration? Please advise, thanks!    Orders Received:    Update: orders received to infuse fluids    Comments:

## 2020-08-17 NOTE — CONSULTS
"[facility]    Vascular Medicine Progress Note     Hermilo Velasquez is a 87 year old male who was admitted on 8/15/2020 for generalized weakness which turned out to be secondary to acute blood loss anemia, patient is on Eliquis for recurrent PE    Interval History        Hermilo Velasquez is a 87 year old male with a history of hypertension, chronic iron deficiency anemia due to chronic blood loss, and chronic PE, Non-Hodgkin's lymphoma, right lung cancer, paroxysmal atrial fibrillation, anticoagulated on Eliquis, who presents for evaluation of generalized weakness. Per chart review, the patient was hospitalized at [facility] in [place], [place] from 7/7/2020-7/10/2020 for weakness and fever. He was found to have pneumonia of the right middle lobe, and was placed on IV ceftriaxone and azithromycin. He had questionable black stool with a positive heoccult at admission. His hemoglobin decreased went down to 5.9, which increased to 8.7 after a RBC transfusion. An EGD was pursued, although no source of bleeding was found. He then got iron and antibiotics. His COVID-19 results were negative at this time.      Patient had a blood pressure of 86/44 and was feeling light-headed. He notes he has been feeling generally weak and fatigued for about one week. He also endorses black stools. The patient states he \"had a mild cough the other day,\" but this has subsided. He denies any abdominal pain, chest pain, shortness of breath, dysuria, fever, headaches, speech difficulty, or one-sided weakness. He states he usually takes his blood pressure daily, and runs around 119 systolic. The patient is not on chemotherapy. He was previously on Lasix, although he was taken off because his blood pressure was dropping.     Recurrent second lifetime VTE presenting as bilateral subsegmental pulmonary emboli without hemodynamic or respiratory compromise presently in the setting of hypercoagulability of " malignancy as referenced above who is additionally having an undefined source of gastrointestinal bleeding.      Patient has past medical history of lung cancer and non-Hodgkin lymphoma patient mentioned that the last treatment he received was about a year and a half ago and that the cancer was taken out completely     Patient was discharged on Lovenox dose was weight-based and he takes the treatment twice a day.     CT chest 03/28/2020: RLL pulmonary embolism. No evidence of Rt heart strain on CT scan or TTE obtained 03/24/2020. No biochemical evidence of RT heart strain as BNP is normal. Venous duplex 03/28/2020: absence of DVT bilaterally.       In 2016 the patient had bilateral lower extremity DVT leading to pulmonary embolus.      Admitted back in April hematemesis.  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  Hgb since admission has been stable.  GI has consulted on the patient. Endoscopic therapy to treat AVMs would need MAC versus general anesthesia, which is not optimal in the setting of his PE.        Physical Exam   Temp: 98  F (36.7  C) Temp src: Oral BP: 115/59 Pulse: 97 Heart Rate: 90 Resp: 18 SpO2: 95 % O2 Device: None (Room air) Oxygen Delivery: 2 LPM  Vitals:    08/16/20 0527 08/17/20 0651   Weight: 75.7 kg (166 lb 14.2 oz) 75.6 kg (166 lb 10.7 oz)     Vital Signs with Ranges  Temp:  [97.4  F (36.3  C)-98.3  F (36.8  C)] 98  F (36.7  C)  Pulse:  [82-97] 97  Heart Rate:  [81-95] 90  Resp:  [16-40] 18  BP: (103-120)/(56-64) 115/59  SpO2:  [85 %-95 %] 95 %  I/O last 3 completed shifts:  In: 100 [I.V.:100]  Out: 1200 [Urine:1200]    Constitutional: awake, alert, cooperative, no apparent distress, and appears stated age  Eyes: Lids and lashes normal, pupils equal, round and reactive to light, extra ocular muscles intact, sclera clear, conjunctiva normal  ENT: normocepalic, without obvious abnormality, oropharynx pink and moist  Hematologic / Lymphatic: no  lymphadenopathy  Respiratory: No increased work of breathing, good air exchange, clear to auscultation bilaterally, no crackles or wheezing  Cardiovascular: regular rate and rhythm, normal S1 and S2 and no murmur noted  GI: Normal bowel sounds, soft, non-distended, non-tender  Skin: no redness, warmth, or swelling, no rashes and no lesions  Musculoskeletal: There is no redness, warmth, or swelling of the joints.  Full range of motion noted.  Motor strength is 5 out of 5 all extremities bilaterally.  Tone is normal.  Neurologic: Awake, alert, oriented to name, place and time.  Cranial nerves II-XII are grossly intact.  Motor is 5 out of 5 bilaterally.    Neuropsychiatric:  Normal affect, memory, insight.  Pulses: Intact. No carotid bruits appreciated.     Medications     - MEDICATION INSTRUCTIONS -         atorvastatin  10 mg Oral Daily     gabapentin  900 mg Oral At Bedtime     metoprolol succinate ER  12.5 mg Oral Daily     pantoprazole (PROTONIX) IV  40 mg Intravenous BID     sodium chloride (PF)  3 mL Intracatheter Q8H     umeclidinium  1 puff Inhalation Daily       Data   No results found for this or any previous visit (from the past 24 hour(s)).    Assessment & Plan   (D64.9) Anemia and generalized weakness  Comment: Patient hemoglobin was around 10 about a month ago on admission and currently it is 7.4 no evidence of lower GI bleed but given the fact that the patient has a history of AVMs gastric and duodenal and  angiectasia's patient had GI bleed ( Heyde Syndrome)  Plan: Continue with hemoglobin and hematocrit monitoring  IV iron on intermittent basis based on his hemoglobin and hematocrit  We will resume Eliquis as the patient has extensive history of VTE (PE x2 and history of DVT)  Follow-up with vascular medicine clinic within a month from discharge          Summary: 87-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

## 2020-08-17 NOTE — PROGRESS NOTES
[facility] note    Discussed EGD with Dr. Johnson, we do not recommend any further GI work-up at this time. Would consider regular hgb checks and IV iron intermittent or scheduled and if Eliquis can be stopped, though does have strong indications.    We will sign off, please call with any questions or concerns.    Herrera Henry MD   [facility]  Cell [phone]  After 5 PM, please call [phone]

## 2020-08-17 NOTE — PROGRESS NOTES
GASTROENTEROLOGY PROGRESS NOTE     SUBJECTIVE: Feels well. No abd pain, nausea, vomiting, melena, or hematochezia. Labs pending today.      OBJECTIVE:   /56 (BP Location: Right arm)   Pulse 82   Temp 98  F (36.7  C) (Oral)   Resp 18   Wt 75.6 kg (166 lb 10.7 oz)   SpO2 92%   BMI 23.91 kg/m     Temp (24hrs), Av.9  F (36.6  C), Min:97.4  F (36.3  C), Max:98.3  F (36.8  C)     Patient Vitals for the past 72 hrs:   Weight   20 0651 75.6 kg (166 lb 10.7 oz)   20 0527 75.7 kg (166 lb 14.2 oz)        Intake/Output Summary (Last 24 hours) at 2020 1025  Last data filed at 2020 0749  Gross per 24 hour   Intake 600 ml   Output 1175 ml   Net -575 ml      PHYSICAL EXAM   Constitutional: Sitting up in the chair, no acute distress  Cardiovascular: Regular rate and rhythm  Respiratory: Clear to auscultation bilaterally  Abdomen: Soft, non-tender, non-distended, normally active bowel sounds. No guarding or rebound tenderness.    ROS, FH, SH: See initial GI consult for details.     I have reviewed the patient's new clinical lab results:   Recent Labs   Lab Test 20  0829 08/15/20  1450 20  1141  20  0727  20  1201   WBC 9.5 9.2 7.5   < > 11.3*   < >  --    HGB 7.5* 7.4* 7.4*   < > 10.2*   < >  --    MCV 81 80 83   < > 87   < >  --     294 255   < > 192   < >  --    INR 1.30*  --   --   --  1.03  --  1.28*    < > = values in this interval not displayed.      Recent Labs   Lab Test 20  0829 08/15/20  2109 08/15/20  1450 20  1141     --  139 140   POTASSIUM 3.8 3.4 3.0* 4.4   CHLORIDE 110*  --  101 108   CO2 32  --  34* 24   BUN 18  --  28 18   CR 1.20  --  1.71* 1.17   ANIONGAP 1*  --  4 8   AMRITA 8.0*  --  8.2* 8.7      Recent Labs   Lab Test 20  0829 20  0400 08/15/20  1450 20  0459 20  1950 20  1914 19  1614   ALBUMIN 2.3*  --  2.9*  --   --  2.2*  --  2.6*  --    BILITOTAL 0.5  --  0.4  --   --  0.6  --   0.6  --    ALT 8  --  10 8   < > 30  --  27  --    AST 7  --  7 18  --  126*  --  101*  --    ALKPHOS 82  --  100  --   --  55  --  80  --    PROTEIN  --  Negative  --   --   --   --  10*  --  10*   LIPASE  --   --   --   --   --   --   --  54*  --     < > = values in this interval not displayed.      IMAGING   CT 8/16/2020  IMPRESSION:  1. No acute pathology in the abdomen or pelvis.  2. Small right pleural effusion and associated basilar  atelectasis/consolidation.  3. Cholelithiasis without CT evidence of acute cholecystitis.  4. Scattered subcentimeter hypoattenuating foci in the liver are too  small to characterize on this noncontrast CT. No intra or extrahepatic  biliary dilatation.    ENDOSCOPIC WORKUP  EGD 7/23/2018  Findings:        The esophagus was normal.        A few 1 to 2 mm no bleeding angioectasias were found in the stomach.        Two 2 to 3 mm angioectasias without bleeding were found in the second        portion of the duodenum.                                                                                     Impression:               - Angioectasias, perhaps contributing to anemia.     Colonoscopy 7/23/2018   Impression:               - Three non-bleeding colonic angioectasias.                             - Five 4 to 5 mm polyps in the transverse colon,                             removed with a cold snare. Resected and retrieved.                             - Two 5 to 8 mm polyps in the sigmoid colon.                             - The examination was otherwise normal.   Recommendation:           - Await biopsy results.                             - Return to hospital [facility], [facility].                             - Further colonoscopies are not recommended for                             this patient.                         IMPRESSION: 87 year old with multiple medical problems, including COPD, lung cancer, CAD, a-fib on Eliquis, hyperlipidemia, question monoclonal gammopathy, question lymphoma,  who has had arteriovenous malformations throughout the GI tract in the past.  At this point, the etiology for his anemia seems multifactorial suspect slow bleeding from known AVMs in setting of chronic anticoagulation. +occult positive. Labs pending today. EGD x1 month ago at outside facility.     PLAN/RECOMMENDATIONS:    -Keep NPO  -COVID 19 pending  -Monitor hgb, transfuse as needed hgb >7  -Monitor stools     Addendum: COVID 19 negative. Will proceed with EGD with MAC this afternoon.     Catalina Rosa CNP  [facility]   Cell: [phone]   Office: [phone]

## 2020-08-17 NOTE — PROGRESS NOTES
[facility]    Hospitalist Progress Note    Assessment & Plan   Hermilo Velasquez is a 87 year old male admitted on 8/15/2020. He presents with generalized weakness.      Acute Blood Loss Anemia  Chronic iron deficiency anemia  History of gastroesophageal reflux disease, gastric and duodenal angiectasias   Assessment: Presents with increasing generalized weakness and fatigue, his hemoglobin roughly 1 month prior to mission was 9.8, on admission it is currently at 7.4.  No direct endorsements of melena or bright red blood per rectum, but do worry that given his history of AVMs, gastric and duodenal angiectasia's, and he is anticoagulated that he is having a possible acute GI bleed.  He is otherwise hemodynamically stable, overall nontoxic-appearing.    Appears patient was hospitalized elsewhere in July with pneumonia as well as concern for GI bleed with black stools.  He did have a EGD and colon at that time and received IV iron infusion as well as PRBC transfusion in the hospital.  Resumed anticoagulation at discharge.    Plan:  -Patient kept n.p.o. with IV fluids.  [facility] consulted.  Patient underwent EGD on 8/17 with no active bleeding identified.  A single nonbleeding angiectasia seen in the duodenum.  Discussed with GI provider, Dr. Henry.  Likely source of anemia is AVMs in the GI tract, seen on prior EGD/colon and likely has small bowel AVMs as well.  Discussed ability to stop anticoagulation.  However patient has strong indications for lifelong anticoagulation.  [See below].  Per GI, patient could have hemoglobin checked perhaps monthly and pursue IV iron infusions to keep his hemoglobin up.  If this strategy fails, then refer back to [facility].  - hold PTA Eliquis at this time.  Will consult vascular medicine for further recommendations.  - Transfuse hgb < 7.0  -Iron stores rechecked on 8/11 and are still low.  Will order Venofer infusion.     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  Patient had second PE in March this year.  Hospitalized with GI bleed in April.  Was seen by vascular medicine at that time.  Was discharged on Lovenox.  Lifelong anticoagulation recommended.  Switched later on to Eliquis.  Plan:  - hold eliquis.   Consult vascular medicine for opinion regarding anticoagulation,?  Any role of IVC filter.     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  On admission Cr 1.71, suspect this is pre-renal in etiology.   Plan:   -FRED resolved with IV fluids.  - Follow Cr/electrolytes     CAD  Patient denies any recent chest pain, palpitations, or shortness of breath. He reports compliance with his home medications.   Plan:  -Continue bid metoprolol at 12.5 mg BID     Paroxysmal atrial fibrillation on Eliquis   Assessment/Plan: continue metoprolol and hold Eliquis     Hyperlipidemia: Continue PTA statin     Chronic pruritus :PTA Atarax       Bullous pemphigoid  Assessment/Plan: stable.     DVT prophylaxis: PCD's  CODE STATUS: Full code  Disposition: Possible discharge home tomorrow if hemoglobin stable, vascular medicine consult is pending.      Megan Johnson MD   Text Page (7am to 6pm)    Interval History   Stable overnight.  No overt melena or hematemesis.  No abdominal pain.  He is very impatiently waiting for EGD.  Frustrated that \"his time is being wasted\".  He does not seem to have a very good understanding of his medical issues.  No nausea or vomiting.    -Data reviewed today: I reviewed all new labs and imaging results over the last 24 hours. I personally reviewed no images or EKG's today.    Physical Exam   Temp: 98  F (36.7  C) Temp src: Oral BP: 108/57 Pulse: 97 Heart Rate: 86 Resp: 28 SpO2: 92 % O2 Device: Nasal cannula Oxygen Delivery: 2 LPM  Vitals:    08/16/20 " 0527 08/17/20 0651   Weight: 75.7 kg (166 lb 14.2 oz) 75.6 kg (166 lb 10.7 oz)     Vital Signs with Ranges  Temp:  [97.4  F (36.3  C)-98.3  F (36.8  C)] 98  F (36.7  C)  Pulse:  [82-97] 97  Heart Rate:  [81-95] 86  Resp:  [16-40] 28  BP: (103-120)/(56-64) 108/57  SpO2:  [85 %-93 %] 92 %  I/O last 3 completed shifts:  In: 600 [P.O.:600]  Out: 1175 [Urine:1175]    Constitutional: awake, alert, cooperative, no apparent distress.   ENT: Normocephalic, without obvious abnormality  Respiratory: CTABL   Cardiovascular: RRR with no m/r/g   GI: Normal bowel sounds, soft, non-distended, non-tender.   Skin: normal skin color, texture, turgor   Neurologic: Awake, alert, oriented to name, place and time.  Moving all extremities.  Neuropsychiatric: normal mood and affect    Medications     - MEDICATION INSTRUCTIONS -       - MEDICATION INSTRUCTIONS -       sodium chloride 75 mL/hr at 08/17/20 0835       atorvastatin  10 mg Oral Daily     gabapentin  900 mg Oral At Bedtime     metoprolol succinate ER  12.5 mg Oral Daily     pantoprazole (PROTONIX) IV  40 mg Intravenous BID     sodium chloride (PF)  3 mL Intracatheter Q8H     sodium chloride (PF)  3 mL Intracatheter Q8H     umeclidinium  1 puff Inhalation Daily       Data   Recent Labs   Lab 08/17/20  0948 08/16/20  0829 08/15/20  2109 08/15/20  1450   WBC 8.9 9.5  --  9.2   HGB 7.7* 7.5*  --  7.4*   MCV 82 81  --  80    272  --  294   INR 1.21* 1.30*  --   --     143  --  139   POTASSIUM 3.9 3.8 3.4 3.0*   CHLORIDE 108 110*  --  101   CO2 31 32  --  34*   BUN 12 18  --  28   CR 1.09 1.20  --  1.71*   ANIONGAP 1* 1*  --  4   AMRITA 8.3* 8.0*  --  8.2*   GLC 92 104*  --  133*   ALBUMIN  --  2.3*  --  2.9*   PROTTOTAL  --  5.3*  --  6.7*   BILITOTAL  --  0.5  --  0.4   ALKPHOS  --  82  --  100   ALT  --  8  --  10   AST  --  7  --  7   TROPI  --   --   --  <0.015       Imaging:   No results found for this or any previous visit (from the past 24 hour(s)).

## 2020-08-17 NOTE — ANESTHESIA PREPROCEDURE EVALUATION
Anesthesia Pre-Procedure Evaluation    Patient: Hemrilo Velasquez   MRN: [id number] : 11/3/1932          Preoperative Diagnosis: Anemia [D64.9]    Procedure(s):  ESOPHAGOGASTRODUODENOSCOPY (EGD)    Past Medical History:   Diagnosis Date     Aortic stenosis     Severe AS, 2015 AVR - [facility] TRIFECTA Bovine bioprosthesis 25MM TF-25A     Atrial fibrillation (H)     2015 Paroxysmal post op Afib - discharged on Warfarin and a beta blocker     Deep vein thrombosis (H)      GERD (gastroesophageal reflux disease)      Heart murmur      Monoclonal gammopathy     plasmacyte prominent causing monoclonal gammopathy     Need for SBE (subacute bacterial endocarditis) prophylaxis      Neuropathy      Other and unspecified hyperlipidemia      Other malignant lymphomas     non hodgkin's lymphoma     RBBB (right bundle branch block)      Severe sepsis with acute organ dysfunction (H) 2015     Unspecified hereditary and idiopathic peripheral neuropathy      Past Surgical History:   Procedure Laterality Date     APPENDECTOMY       AS TOTAL KNEE ARTHROPLASTY       BACK SURGERY       ESOPHAGOSCOPY, GASTROSCOPY, DUODENOSCOPY (EGD), COMBINED N/A 2015    Procedure: COMBINED ESOPHAGOSCOPY, GASTROSCOPY, DUODENOSCOPY (EGD);  Surgeon: Danis Castillo MD;  Location:  GI     ESOPHAGOSCOPY, GASTROSCOPY, DUODENOSCOPY (EGD), COMBINED N/A 2018    Procedure: COMBINED ESOPHAGOSCOPY, GASTROSCOPY, DUODENOSCOPY (EGD);;  Surgeon: Toby Dong DO;  Location:  GI     HERNIA REPAIR  2006     HERNIORRHAPHY VENTRAL  2013    Procedure: HERNIORRHAPHY VENTRAL;  VENTRAL HERNIA REPAIR WITH MESH;  Surgeon: Patel Guzman MD;  Location:  OR     KNEE SURGERY      arthroscopic right knee surgery      LOBECTOMY LUNG Right 3/26/2019    Procedure: POSSIBLE LOBECTOMY LUNG;  Surgeon: Jose A Vasques MD;  Location:  OR     REPAIR LIGAMENT ANKLE  2012    Procedure:REPAIR LIGAMENT ANKLE; LEFT TARSAL TUNNEL  RELEASE OF KNOT OF ARIEL RELEASE; Surgeon:SAUL PUENTE; Location: OR     REPLACE VALVE AORTIC N/A 9/3/2015    Procedure: REPLACE VALVE AORTIC;  Surgeon: Antonino Mitchell MD;  Location:  OR     ROTATOR CUFF REPAIR RT/LT      bilateral     SPINE SURGERY      3 spine surgeries     THORACOTOMY, WEDGE RESECTION LUNG, COMBINED Right 3/26/2019    Procedure: RIGHT THORACOTOMY, WEDGE RESECTION, RIGHT LOWER LOBE LUNG NODULE,;  Surgeon: Jose A Vasques MD;  Location:  OR     TONSILLECTOMY       TURP         Anesthesia Evaluation     . Pt has had prior anesthetic. Type: General    No history of anesthetic complications          ROS/MED HX    ENT/Pulmonary:     (+)COPD (low resting sats 90-92%), , . .   (-) tobacco use and asthma   Neurologic:     (+)neuropathy    (-) CVA and TIA   Cardiovascular: Comment: S/p AVR 2015    (+) hypertension----. : . . . :. dysrhythmias a-fib, .      (-) CAD, irregular heartbeat/palpitations and stent   METS/Exercise Tolerance:     Hematologic:        (-) anemia   Musculoskeletal:         GI/Hepatic:     (+) GERD      (-) liver disease   Renal/Genitourinary:      (-) renal disease   Endo:      (-) Type I DM, Type II DM and thyroid disease   Psychiatric:         Infectious Disease:  - neg infectious disease ROS       Malignancy:         Other:                          Physical Exam  Normal systems: cardiovascular, pulmonary and dental    Airway   Mallampati: II  TM distance: >3 FB  Neck ROM: full    Dental     Cardiovascular       Pulmonary (+) wheezes               Lab Results   Component Value Date    WBC 8.9 08/17/2020    HGB 7.7 (L) 08/17/2020    HCT 26.8 (L) 08/17/2020     08/17/2020    .0 (H) 05/16/2019    SED 89 (H) 05/16/2019     08/17/2020    POTASSIUM 3.9 08/17/2020    CHLORIDE 108 08/17/2020    CO2 31 08/17/2020    BUN 12 08/17/2020    CR 1.09 08/17/2020    GLC 92 08/17/2020    AMRITA 8.3 (L) 08/17/2020    PHOS 3.9 07/23/2018    MAG 2.1  "07/23/2018    ALBUMIN 2.3 (L) 08/16/2020    PROTTOTAL 5.3 (L) 08/16/2020    ALT 8 08/16/2020    AST 7 08/16/2020    ALKPHOS 82 08/16/2020    BILITOTAL 0.5 08/16/2020    LIPASE 54 (L) 03/22/2020    PTT 30 03/22/2020    INR 1.21 (H) 08/17/2020    FIBR 252 09/03/2015    TSH 2.88 07/31/2018    T4 1.21 02/21/2018       Preop Vitals  BP Readings from Last 3 Encounters:   08/17/20 118/64   08/11/20 97/50   07/22/20 127/58    Pulse Readings from Last 3 Encounters:   08/17/20 82   08/11/20 74   07/14/20 64      Resp Readings from Last 3 Encounters:   08/17/20 16   04/02/20 16   12/25/19 18    SpO2 Readings from Last 3 Encounters:   08/17/20 92%   08/11/20 96%   04/13/20 94%      Temp Readings from Last 1 Encounters:   08/17/20 36.7  C (98  F) (Oral)    Ht Readings from Last 1 Encounters:   08/11/20 1.778 m (5' 10\")      Wt Readings from Last 1 Encounters:   08/17/20 75.6 kg (166 lb 10.7 oz)    Estimated body mass index is 23.91 kg/m  as calculated from the following:    Height as of 8/11/20: 1.778 m (5' 10\").    Weight as of this encounter: 75.6 kg (166 lb 10.7 oz).       Anesthesia Plan      History & Physical Review  History and physical reviewed and following examination; no interval change.    ASA Status:  3 .    NPO Status:  > 6 hours    Plan for MAC (with GA backup) with Intravenous induction. Maintenance will be TIVA.        Neb prior to procedure        Postoperative Care  Postoperative pain management:  Oral pain medications.      Consents  Anesthetic plan, risks, benefits and alternatives discussed with:  Patient (Including possibility of intraoperative awareness or recall.)..                 Lam Marina MD  "

## 2020-08-17 NOTE — PLAN OF CARE
Pt here with generalized weakness/anemia. A&O. VSS. Clear liquid diet, NPO at [facility]. Up with A1, GB, cane. Denies pain. Pt scoring green on the Aggression Stop Light Tool. Plan for EGD tomorrow.

## 2020-08-17 NOTE — ANESTHESIA POSTPROCEDURE EVALUATION
Patient: Hermilo Velasquez    Procedure(s):  ESOPHAGOGASTRODUODENOSCOPY (EGD)    Diagnosis:Anemia [D64.9]  Diagnosis Additional Information: No value filed.    Anesthesia Type:  MAC    Note:  Anesthesia Post Evaluation    Patient location during evaluation: Phase 2 and Endoscopy Recovery  Patient participation: Able to fully participate in evaluation  Level of consciousness: awake and alert  Pain management: adequate  Airway patency: patent  Cardiovascular status: acceptable  Respiratory status: acceptable  Hydration status: acceptable  PONV: none             Last vitals:  Vitals:    08/17/20 1305 08/17/20 1312 08/17/20 1336   BP:  111/63 115/59   Pulse:      Resp: 28 28 22   Temp:      SpO2: 92% 92% 95%         Electronically Signed By: Lam Marina MD  August 17, 2020  3:02 PM

## 2020-08-17 NOTE — PLAN OF CARE
POD #0 from an EGD. A&Ox4, [employer] [place]  [employer] [place], glasses and dentures with patient. CMS baseline numbness and tingling. Bowel sounds audible, passing flatus, voiding adequately in urinal, tolerating 2g Na/low fat diet. VSS. Up with assist x1 with patient's own cane. Denies chest pain, SOB. Pt scoring green on the Aggression Stop Light Tool. Plan pending bilateral lower extremity ultrasound.

## 2020-08-17 NOTE — PROGRESS NOTES
"DATE & TIME: 08/16/2020 Night    Cognitive Concerns/ Orientation : Alert/Oriented x 4. Dentures/Glasses at bedside   BEHAVIOR & AGGRESSION TOOL COLOR: Green  CIWA SCORE: n/a   ABNL VS/O2: VSS, room air  MOBILITY: Assist-1 cane, gait belt  PAIN MANAGMENT: Denies pain this shift  DIET: NPO since midnight  BOWEL/BLADDER: Urinal at bedside. No BM this shift  ABNL LAB/BG: Hgb 7.5, Hematocrit 25.7  DRAIN/DEVICES: L and R PIVs saline locked  TELEMETRY RHYTHM: n/a  SKIN: pale, bruised, scabs  TESTS/PROCEDURES: EGD on Monday 8/14. Abdominal CT negative for \"acute pathology.\"  D/C DAY/GOALS/PLACE: Pending  OTHER IMPORTANT INFO: Eliquis held. Transfuse if Hgb < 7.  MD/RN ROUNDING SIGNED OFF D/E SHIFT: n/a  COMMIT TO SIT DONE AND SIGNED OFF YES        "

## 2020-08-17 NOTE — ANESTHESIA CARE TRANSFER NOTE
Patient: Hermilo Velasquez    Procedure(s):  ESOPHAGOGASTRODUODENOSCOPY (EGD)    Diagnosis: Anemia [D64.9]  Diagnosis Additional Information: No value filed.    Anesthesia Type:   MAC     Note:  Airway :Nasal Cannula  Patient transferred to:Phase II  Comments: At end of procedure, spontaneous respirations, patient alert to voice, able to follow commands. Oxygen via nasal cannula at 2 liters per minute to PACU. Oxygen tubing connected to wall O2 in PACU, SpO2, NiBP, and EKG monitors and alarms on and functioning, report on patient's clinical status given to PACU RN, RN questions answered.Handoff Report: Identifed the Patient, Identified the Reponsible Provider, Reviewed the pertinent medical history, Discussed the surgical course, Reviewed Intra-OP anesthesia mangement and issues during anesthesia, Set expectations for post-procedure period and Allowed opportunity for questions and acknowledgement of understanding      Vitals: (Last set prior to Anesthesia Care Transfer)    CRNA VITALS  8/17/2020 1221 - 8/17/2020 1254      8/17/2020             Resp Rate (set):  10                Electronically Signed By: ILYA Barnes CRNA  August 17, 2020  12:54 PM

## 2020-08-18 VITALS
TEMPERATURE: 97.4 F | DIASTOLIC BLOOD PRESSURE: 54 MMHG | WEIGHT: 173.1 LBS | RESPIRATION RATE: 24 BRPM | BODY MASS INDEX: 24.84 KG/M2 | OXYGEN SATURATION: 95 % | SYSTOLIC BLOOD PRESSURE: 103 MMHG | HEART RATE: 91 BPM

## 2020-08-18 LAB — HGB BLD-MCNC: 7.9 G/DL (ref 13.3–17.7)

## 2020-08-18 PROCEDURE — 25000128 H RX IP 250 OP 636: Performed by: STUDENT IN AN ORGANIZED HEALTH CARE EDUCATION/TRAINING PROGRAM

## 2020-08-18 PROCEDURE — 25800030 ZZH RX IP 258 OP 636: Performed by: HOSPITALIST

## 2020-08-18 PROCEDURE — C9113 INJ PANTOPRAZOLE SODIUM, VIA: HCPCS | Performed by: STUDENT IN AN ORGANIZED HEALTH CARE EDUCATION/TRAINING PROGRAM

## 2020-08-18 PROCEDURE — 36415 COLL VENOUS BLD VENIPUNCTURE: CPT | Performed by: HOSPITALIST

## 2020-08-18 PROCEDURE — 99239 HOSP IP/OBS DSCHRG MGMT >30: CPT | Performed by: HOSPITALIST

## 2020-08-18 PROCEDURE — 25000132 ZZH RX MED GY IP 250 OP 250 PS 637: Performed by: HOSPITALIST

## 2020-08-18 PROCEDURE — 25000128 H RX IP 250 OP 636: Performed by: HOSPITALIST

## 2020-08-18 PROCEDURE — 25000132 ZZH RX MED GY IP 250 OP 250 PS 637: Performed by: STUDENT IN AN ORGANIZED HEALTH CARE EDUCATION/TRAINING PROGRAM

## 2020-08-18 PROCEDURE — 85018 HEMOGLOBIN: CPT | Performed by: HOSPITALIST

## 2020-08-18 RX ADMIN — IRON SUCROSE 300 MG: 20 INJECTION, SOLUTION INTRAVENOUS at 11:19

## 2020-08-18 RX ADMIN — METOPROLOL SUCCINATE 12.5 MG: 25 TABLET, EXTENDED RELEASE ORAL at 08:53

## 2020-08-18 RX ADMIN — PANTOPRAZOLE SODIUM 40 MG: 40 INJECTION, POWDER, FOR SOLUTION INTRAVENOUS at 08:53

## 2020-08-18 RX ADMIN — ATORVASTATIN CALCIUM 10 MG: 10 TABLET, FILM COATED ORAL at 08:53

## 2020-08-18 RX ADMIN — APIXABAN 5 MG: 5 TABLET, FILM COATED ORAL at 11:16

## 2020-08-18 RX ADMIN — UMECLIDINIUM 1 PUFF: 62.5 AEROSOL, POWDER ORAL at 08:53

## 2020-08-18 ASSESSMENT — ACTIVITIES OF DAILY LIVING (ADL)
ADLS_ACUITY_SCORE: 17

## 2020-08-18 NOTE — PROVIDER NOTIFICATION
MD Notification    Notified Person: MD Johnson     Notified Person Name:    Notification Date/Time: 8/18/20 1132    Notification Interaction: text page     Purpose of Notification: Hi Dr, O2: 91% room air at rest,mid 70's-80's% room air ambulating. Rest few minutes increases to 91% room air    Orders Received:    Comments:

## 2020-08-18 NOTE — PLAN OF CARE
Physical Therapy Discharge Summary    Reason for therapy discharge:    Discharged to home.    Progress towards therapy goal(s). See goals on Care Plan in [facility] electronic health record for goal details.  Goals partially met.  Barriers to achieving goals:   discharge from facility.    Therapy recommendation(s):    No further therapy is recommended.

## 2020-08-18 NOTE — PLAN OF CARE
Pt given discharge instructions. Questions answered. Discharge medications given and reviewed with patient. Follow up appointment recommended, pt aware. Pt to DC home with all personal belongings at time of discharge.

## 2020-08-18 NOTE — DISCHARGE SUMMARY
Discharge Summary  Hospitalist    Date of Admission:  8/15/2020  Date of Discharge:  8/18/2020  Discharging Provider: Megan Johnson MD  Date of Service (when I saw the patient): 08/18/20    Discharge Diagnoses   Acute Blood Loss Anemia, most likely due to slow GI bleed  Chronic iron deficiency anemia  History of gastroesophageal reflux disease, gastric and duodenal angiectasias    History of lung cancer and NHL in remission   History of recurrent PE  Chronic obstructive lung disease/emphysema on chronic oxygen at night  Acute kidney injury, resolved  CAD  Paroxysmal atrial fibrillation on Eliquis     History of Present Illness   Please refer H & P for details.      Hospital Course   Hermilo Velasquez is a 87 year old male admitted on 8/15/2020. He presents with generalized weakness.      Acute Blood Loss Anemia  Chronic iron deficiency anemia  History of gastroesophageal reflux disease, gastric and duodenal angiectasias   Assessment: Presents with increasing generalized weakness and fatigue, his hemoglobin roughly 1 month prior to mission was 9.8, on admission it is currently at 7.4.  No direct endorsements of melena or bright red blood per rectum, but do worry that given his history of AVMs, gastric and duodenal angiectasia's, and he is anticoagulated that he is having a possible acute GI bleed.  He is otherwise hemodynamically stable, overall nontoxic-appearing.     Appears patient was hospitalized elsewhere in July with pneumonia as well as concern for GI bleed with black stools.  He did have a EGD and colon at that time and received IV iron infusion as well as PRBC transfusion in the hospital.  Resumed anticoagulation at discharge.    Plan:  -Patient kept n.p.o. with IV fluids.  Eliquis held. [facility] consulted.  Patient underwent EGD on 8/17 with no active bleeding identified.  A single nonbleeding angiectasia seen in the duodenum.  Discussed with GI provider, Dr. Henry.  Likely source of anemia is AVMs  in the GI tract, seen on prior EGD/colon and likely has small bowel AVMs as well.  Discussed ability to stop anticoagulation.  However patient has strong indications for lifelong anticoagulation.  [See below].  Per GI, patient could have hemoglobin checked perhaps monthly and pursue IV iron infusions to keep his hemoglobin up.  If this strategy fails, then refer back to [facility].  -Vascular medicine consulted.  Recommended to resume Eliquis given several risk factors for clotting.  -Eliquis restarted.  Given Venofer infusion x2.  Hemoglobin remained stable between 7-7.5 without need for any PRBC transfusion.  -Resumed oral iron supplement at discharge.      History of lung cancer and NHL in remission   History of recurrent PE  Assessment: PTA on Eliquis.  Patient had second PE in March this year.  Hospitalized with GI bleed in April.  Was seen by vascular medicine at that time.  Was discharged on Lovenox.  Lifelong anticoagulation recommended.  Switched later on to Eliquis.  Plan:  -Held Eliquis initially.  See above.  Vascular medicine consulted.  Resumed Eliquis after EGD.  Bilateral lower extremity ultrasound negative for DVT.  No indication for IVC filter at this time.    Dusky appearance of left foot noted, not painful.  He has noticed this intermittently over few weeks/months.  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  Bilateral DP pulses palpable.  Feet are warm.  He has vascular medicine follow-up in 1 month.  Advised patient to discuss further with them in case of worsening appearance of foot or pain.     Chronic obstructive lung disease/emphysema on chronic oxygen at night  Assessment:   Plan:  -continue PTA inhalers  -Patient states that he uses 1 L oxygen at night but not consistently.  He was noted to desat to mid 70s to 80s with ambulation but recovered within a few minutes to low 90s at rest.  Hence advised to continue oxygen use as previously prescribed at home.  No changes made.     Acute  kidney injury, resolved  Assessment: Normal creatinine at baseline.  On admission Cr 1.71, suspect this is pre-renal in etiology.   Plan:   -FRED resolved with IV fluids.  - Follow Cr/electrolytes     CAD  Patient denies any recent chest pain, palpitations, or shortness of breath. He reports compliance with his home medications.   Plan:  -Continue bid metoprolol at 12.5 mg BID     Paroxysmal atrial fibrillation on Eliquis   Assessment/Plan: continue metoprolol and hold Eliquis     Hyperlipidemia: Continue PTA statin     Chronic pruritus :PTA Atarax       Bullous pemphigoid  Assessment/Plan: stable.       Stable at discharge home.  Follow-up with PCP and vascular medicine.    Megan Johnson MD, MD      Pending Results   These results will be followed up by Hospitalist team.  Unresulted Labs Ordered in the Past 30 Days of this Admission     Date and Time Order Name Status Description    8/15/2020 1423 Blood culture Preliminary     8/15/2020 1423 Blood culture Preliminary           Code Status   Full Code       Primary Care Physician   Karson Bishop    Follow-ups Needed After Discharge   Follow-up Appointments     Follow-up and recommended labs and tests       Follow up with primary care provider, Karson Bishop, within 7 days for   hospital follow- up.  The following labs/tests are recommended: Hemoglobin   in 1 week.  Follow-up with vascular medicine clinic in 1 month.             Physical Exam   Temp: 97.4  F (36.3  C) Temp src: Oral BP: 103/54 Pulse: 91 Heart Rate: 104 Resp: 24 SpO2: 95 % O2 Device: Nasal cannula Oxygen Delivery: 1 LPM  Vitals:    08/16/20 0527 08/17/20 0651 08/18/20 0618   Weight: 75.7 kg (166 lb 14.2 oz) 75.6 kg (166 lb 10.7 oz) 78.5 kg (173 lb 1.6 oz)     Vital Signs with Ranges  Temp:  [97.3  F (36.3  C)-98.2  F (36.8  C)] 97.4  F (36.3  C)  Pulse:  [80-97] 91  Heart Rate:  [] 104  Resp:  [18-40] 24  BP: ()/(50-63) 103/54  SpO2:  [80 %-97 %] 95 %  I/O last 3 completed  shifts:  In: 103 [I.V.:103]  Out: 1275 [Urine:1275]      Constitutional: awake, alert, cooperative, no apparent distress.   ENT: Normocephalic, without obvious abnormality  Respiratory: CTABL   Cardiovascular: RRR with no m/r/g   GI: Normal bowel sounds, soft, non-distended, non-tender.   Skin: normal skin color, texture, turgor   Neurologic: Awake, alert, oriented to name, place and time.  Moving all extremities.  Neuropsychiatric: normal mood and affect  MSK: Left foot noted to be dusky in appearance compared to the right.  Foot is warm, DP pulses palpable.  Nails on both feet with onychomycosis    Discharge Disposition   Discharged to home  Condition at discharge: Stable    Consultations This Hospital Stay   PHYSICAL THERAPY ADULT IP CONSULT  GASTROENTEROLOGY IP CONSULT  [facility] IP CONSULT    Time Spent on this Encounter   IMegan MD, personally saw the patient today and spent greater than 30 minutes discharging this patient.    Discharge Orders      Reason for your hospital stay    You were     Follow-up and recommended labs and tests     Follow up with primary care provider, Karson Bishop, within 7 days for hospital follow- up.  The following labs/tests are recommended: Hemoglobin in 1 week.  Follow-up with vascular medicine clinic in 1 month.     Activity    Your activity upon discharge: activity as tolerated     When to contact your care team    Call your primary doctor if you have any of the following: Worsening fatigue, dizziness, shortness of breath, bloody stools, bleeding issues, fever, leg swelling or pain, abdominal pain     Full Code     Diet    Follow this diet upon discharge: Orders Placed This Encounter      Low Saturated Fat Na <2400 mg     Discharge Medications   Current Discharge Medication List      CONTINUE these medications which have NOT CHANGED    Details   acetaminophen (TYLENOL) 325 MG tablet Take 2 tablets by mouth every 4 hours as needed for mild pain        albuterol (PROAIR HFA/PROVENTIL HFA/VENTOLIN HFA) 108 (90 Base) MCG/ACT Inhaler Inhale 1-2 puffs into the lungs every 6 hours as needed for shortness of breath / dyspnea or wheezing  Qty: 3 Inhaler, Refills: 5    Comments: PLEASE DISPENSE PROAIR HFA, this is preferred by insurance  Associated Diagnoses: Chronic obstructive pulmonary disease, unspecified COPD type (H)      apixaban ANTICOAGULANT (ELIQUIS ANTICOAGULANT) 5 MG tablet Take 1 tablet (5 mg) by mouth 2 times daily  Qty: 60 tablet, Refills: 11    Associated Diagnoses: Pulmonary embolism, bilateral (H)      atorvastatin (LIPITOR) 10 MG tablet Take 1 tablet (10 mg) by mouth daily  Qty: 90 tablet, Refills: 3    Associated Diagnoses: Mixed hyperlipidemia      famotidine (PEPCID) 40 MG tablet Take 1 tablet (40 mg) by mouth 2 times daily  Qty: 60 tablet, Refills: 1    Associated Diagnoses: Iron deficiency anemia due to chronic blood loss      ferrous sulfate (FE TABS) 325 (65 Fe) MG EC tablet Take 1 tablet (325 mg) by mouth three times a week  Qty: 36 tablet, Refills: 3    Associated Diagnoses: Iron deficiency anemia, unspecified iron deficiency anemia type      !! hydrOXYzine (ATARAX) 25 MG tablet Take 50 mg by mouth At Bedtime      metoprolol succinate ER (TOPROL-XL) 25 MG 24 hr tablet Take 0.5 tablets (12.5 mg) by mouth daily  Qty: 30 tablet, Refills: 0    Associated Diagnoses: Benign essential hypertension      pantoprazole (PROTONIX) 40 MG EC tablet Take 1 tablet (40 mg) by mouth 2 times daily (before meals)  Qty: 60 tablet, Refills: 1    Associated Diagnoses: Anemia due to blood loss, acute      tiotropium (SPIRIVA HANDIHALER) 18 MCG capsule Inhale 1 capsule (18 mcg) into the lungs daily  Qty: 90 capsule, Refills: 3    Associated Diagnoses: Chronic obstructive pulmonary disease, unspecified COPD type (H)      triamcinolone (KENALOG) 0.1 % external cream Apply topically 2 times daily as needed for irritation  Qty: 453.6 g, Refills: 3    Associated  "Diagnoses: Chronic pruritus      gabapentin (NEURONTIN) 300 MG capsule Take 900 mg by mouth At Bedtime      !! hydrOXYzine (ATARAX) 25 MG tablet Take 2 tablets (50 mg) by mouth every 6 hours as needed  Qty: 360 tablet, Refills: 3    Associated Diagnoses: Chronic pruritus      order for DME Equipment being ordered: Right neoprene knee brace.  ( Fax to [facility] fax  [phone])  Qty: 1 each, Refills: 0    Associated Diagnoses: Chronic pain of right knee       !! - Potential duplicate medications found. Please discuss with provider.        Allergies   Allergies   Allergen Reactions     Lidocaine Blisters     Allergy to lidocaine ointment     Omeprazole Itching     Pantoprazole Itching     Prevacid [Lansoprazole] Itching     Lasix [Furosemide] Rash     Penicillins Rash     \"broke out from injection\" 60 yrs ago       Data   Most Recent 3 CBC's:  Recent Labs   Lab Test 08/18/20  0829 08/17/20  0948 08/16/20  0829 08/15/20  1450   WBC  --  8.9 9.5 9.2   HGB 7.9* 7.7* 7.5* 7.4*   MCV  --  82 81 80   PLT  --  273 272 294      Most Recent 3 BMP's:  Recent Labs   Lab Test 08/17/20  0948 08/16/20  0829 08/15/20  2109 08/15/20  1450    143  --  139   POTASSIUM 3.9 3.8 3.4 3.0*   CHLORIDE 108 110*  --  101   CO2 31 32  --  34*   BUN 12 18  --  28   CR 1.09 1.20  --  1.71*   ANIONGAP 1* 1*  --  4   AMRITA 8.3* 8.0*  --  8.2*   GLC 92 104*  --  133*     Most Recent 2 LFT's:  Recent Labs   Lab Test 08/16/20  0829 08/15/20  1450   AST 7 7   ALT 8 10   ALKPHOS 82 100   BILITOTAL 0.5 0.4     Most Recent INR's and Anticoagulation Dosing History:  Anticoagulation Dose History     Recent Dosing and Labs Latest Ref Rng & Units 2/27/2020 3/22/2020 3/23/2020 3/24/2020 3/28/2020 8/16/2020 8/17/2020    INR 0.86 - 1.14 - 1.93(H) 1.68(H) 1.28(H) 1.03 1.30(H) 1.21(H)    INR 0.86 - 1.14 2.0(A) - - - - - -    INR Point of Care 0.86 - 1.14 - - - - - - -        Most Recent 3 Troponin's:  Recent Labs   Lab Test 08/15/20  1450 03/23/20  0459 " 03/22/20  1914   TROPI <0.015 0.738* 0.580*     Most Recent Cholesterol Panel:  Recent Labs   Lab Test 06/18/20  1144   CHOL 107   LDL 53   HDL 32*   TRIG 109     Most Recent 6 Bacteria Isolates From Any Culture (See EPIC Reports for Culture Details):  Recent Labs   Lab Test 08/15/20  1451 03/22/20  1950 03/22/20  1941 03/22/20  1935/19  1428 12/22/19  1342   CULT No growth after 3 days  No growth after 3 days No growth Cultured on the 4th day of incubation:  Staphylococcus capitis  Identification obtained by MALDI-TOF mass spectrometry research use only database. Test   characteristics determined and verified by the [facility]   ([facility]) [place], [place].  *  Critical Value/Significant Value, preliminary result only, called to and read back by  ANNABELLE CABRERA RN  03.26.20 CF    (Note)  POSITIVE for Staphylococci other than S.aureus, S.epidermidis and  S.lugdunensis, by Verigene multiplex nucleic acid test.  Coagulase-negative staphylococci are the most common venipuncture or  collection associated skin CONTAMINANTS grown in blood cultures.  Final identification and antimicrobial susceptibility testing will be  verified by standard methods.    Specimen tested with Verigene multiplex, gram-positive blood culture  nucleic acid test for the following targets: Staph aureus, Staph  epidermidis, Staph lugdunensis, other Staph species, Enterococcus  faecalis, Enterococcus faecium, Streptococcus species, S. agalactiae,  S. anginosus grp., S. pneumoniae, S. pyogenes, Listeria sp., mecA  (methicillin resistance) and Samanta/B (vancomycin resistance).    Critical Value/Significant Value called to and read back by Doreen Silverman RN on 3.26.20 at 0727. bw   No growth No growth Cultured on the 2nd day of incubation:  Staphylococcus hominis  *  Critical Value/Significant Value, preliminary result only, called to and read back by  DAVID GARCIA RN @1019 12/24/19. [facility]     (Note)  POSITIVE for Staphylococci other than S.aureus, S.epidermidis and  S.lugdunensis, by Verigene multiplex nucleic acid test.  Coagulase-negative staphylococci are the most common venipuncture or  collection associated skin CONTAMINANTS grown in blood cultures.  Final identification and antimicrobial susceptibility testing will be  verified by standard methods.    Specimen tested with Verigene multiplex, gram-positive blood culture  nucleic acid test for the following targets: Staph aureus, Staph  epidermidis, Staph lugdunensis, other Staph species, Enterococcus  faecalis, Enterococcus faecium, Streptococcus species, S. agalactiae,  S. anginosus grp., S. pneumoniae, S. pyogenes, Listeria sp., mecA  (methicillin resistance) and Samanta/B (vancomycin resistance).    Critical Value/Significant Value called to and read back by Aurora Giron RN at 1305 12.24.19.DK       Most Recent TSH, T4 and A1c Labs:  Recent Labs   Lab Test 07/31/18  1104 02/21/18  1124   TSH 2.88 2.38   T4  --  1.21   A1C 5.8*  --        Results for orders placed or performed during the hospital encounter of 08/15/20   XR Chest 2 Views    Narrative    CHEST TWO VIEWS 8/15/2020 3:10 PM     HISTORY: Weakness.    COMPARISON: 3/26/2020       Impression    IMPRESSION: Minimal pleural fluid or pleural thickening on the right.  Stable minimal interstitial changes. Left lung clear. The cardiac  silhouette is not enlarged. Pulmonary vasculature is unremarkable.    FUENTES SIMON MD   CT Abdomen Pelvis w/o Contrast    Narrative    CT ABDOMEN AND PELVIS WITHOUT CONTRAST   8/16/2020 10:55 AM     HISTORY: Abdominal distension; increased liver on exam.    TECHNIQUE: Noncontrast CT abdomen and pelvis was performed. Radiation  dose for this scan was reduced using automated exposure control,  adjustment of the mA and/or kV according to patient size, or iterative  reconstruction technique.    COMPARISON: CT chest/abdomen/pelvis on 3/22/2020.    FINDINGS:   Lower  chest: Small right pleural effusion and associated basilar  atelectasis/consolidation. Mild left basilar atelectasis.  Hypoattenuating appearance of the intracardiac blood, likely due to  low hemoglobin.    Abdomen/pelvis: Limited evaluation of the abdominal organs due to lack  of IV contrast. Cholelithiasis without CT evidence of acute  cholecystitis. A few scattered subcentimeter hypoattenuating foci in  the liver, for example, in the inferior aspect of the right hepatic  lobe (series 4 image 51), are too small to characterize. The  unenhanced spleen, pancreas and adrenal glands are grossly  unremarkable. No radiodense kidney stones or hydronephrosis in either  kidney.    No abnormally dilated bowel loops. Moderate amount of stool throughout  the colon. No significant free fluid in the abdomen or pelvis. No free  peritoneal or portal venous gas. Post surgical changes of TURP.  Moderate atherosclerotic vascular calcification predominantly of the  abdominal aorta. 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    Bones and soft tissue: Multilevel degenerative changes of the spine.  No suspicious osseous lesion.      Impression    IMPRESSION:  1. No acute pathology in the abdomen or pelvis.  2. Small right pleural effusion and associated basilar  atelectasis/consolidation.  3. Cholelithiasis without CT evidence of acute cholecystitis.  4. Scattered subcentimeter hypoattenuating foci in the liver are too  small to characterize on this noncontrast CT. No intra or extrahepatic  biliary dilatation.    KIRSTY HOLDEN MD   US Lower Extremity Venous Duplex Bilateral    Narrative    EXAM: US LOWER EXTREMITY VENOUS DUPLEX BILATERAL  LOCATION: [facility]  DATE/TIME: 8/17/2020 10:04 PM    INDICATION: Bilateral lower extremity swelling and history of pulmonary embolism.  COMPARISON:  None.  TECHNIQUE: Venous Duplex ultrasound of bilateral lower extremities with and without compression, augmentation and duplex. Color flow and spectral Doppler with waveform analysis performed.    FINDINGS: Exam includes the common femoral, femoral, popliteal veins as well as segmentally visualized deep calf veins and greater saphenous vein.     RIGHT: No deep vein thrombosis. No superficial thrombophlebitis. No popliteal cyst.    LEFT: No deep vein thrombosis. No superficial thrombophlebitis. No popliteal cyst.      Impression    IMPRESSION:  1.  No deep venous thrombosis in the bilateral lower extremities.

## 2020-08-18 NOTE — PLAN OF CARE
DATE & TIME: 08/17/2020 1900-2330    Cognitive Concerns/ Orientation : Alert/Oriented x 4. Dentures/Glasses at bedside   BEHAVIOR & AGGRESSION TOOL COLOR: Green  CIWA SCORE: n/a   ABNL VS/O2: VSS, room air  MOBILITY: Assist-1 cane, gait belt  PAIN MANAGMENT: Denies pain this shift  DIET: 2g Na.   BOWEL/BLADDER: Urinal at bedside. 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 Transfuse if Hgb < 7.0, VSS on RA, Up with GB and cane, denies pain, will continue to monitor

## 2020-08-18 NOTE — PLAN OF CARE
DATE & TIME: 08/18 2300-0730  Cognitive Concerns/ Orientation : A&O x 4 Dentures/Glasses at bedside   BEHAVIOR & AGGRESSION TOOL COLOR: Green  CIWA SCORE: n/a   ABNL VS/O2: O2 sat low 80's, placed on 3L NC , tachypneic, tachycardic, LAROSE  MOBILITY: Assist-1 cane, gait belt  PAIN MANAGMENT: Denies   DIET: 2g Na.  Low sat fat.   BOWEL/BLADDER: Urinal at bedside. 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   D/C DAY/GOALS/PLACE: Pending  OTHER IMPORTANT INFO: Transfuse if Hgb < 7.0

## 2020-08-18 NOTE — PLAN OF CARE
POD #1 from a EGD. A&Ox4, [place], glasses and dentures with patient. CMS baseline numbness and tingling. Bowel sounds audible, passing flatus, tolerating 2g Na/low fat diet. VS-O2 1L NC, O2 dips to mid 70's-80's with ambulating-provider notified, pt has home O2. Up with assist x1 with gb and own cane. Restarted Eliquis. Denies chest pain. Pt scoring green on the Aggression Stop Light Tool. Plan discharge home this afternoon.

## 2020-08-19 ENCOUNTER — TELEPHONE (OUTPATIENT)
Dept: FAMILY MEDICINE | Facility: CLINIC | Age: 85
End: 2020-08-19

## 2020-08-19 ENCOUNTER — PATIENT OUTREACH (OUTPATIENT)
Dept: NURSING | Facility: CLINIC | Age: 85
End: 2020-08-19
Payer: COMMERCIAL

## 2020-08-19 DIAGNOSIS — K92.2 GI BLEED: ICD-10-CM

## 2020-08-19 DIAGNOSIS — R53.1 GENERALIZED WEAKNESS: Primary | ICD-10-CM

## 2020-08-19 DIAGNOSIS — R91.1 NODULE OF LOWER LOBE OF RIGHT LUNG: ICD-10-CM

## 2020-08-19 ASSESSMENT — ACTIVITIES OF DAILY LIVING (ADL): DEPENDENT_IADLS:: INDEPENDENT

## 2020-08-19 NOTE — LETTER
[place] CARE COORDINATION  [address] [facility] [address]  [facility] [address]    August 19, 2020    Hermilo Velasquez  [address]  [place] [address]      Dear Hermilo,    I am a clinic care coordinator who works with Karson Bishop MD at [facility] . I wanted to thank you for spending the time to talk with me.  Below is a description of clinic care coordination and how I can further assist you.      The clinic care coordination team is made up of a registered nurse,  and community health worker who understand the health care system. The goal of clinic care coordination is to help you manage your health and improve access to the health care system in the most efficient manner. The team can assist you in meeting your health care goals by providing education, coordinating services, strengthening the communication among your providers and supporting you with any resource needs.    Please feel free to contact me at [phone] with any questions or concerns. We are focused on providing you with the highest-quality healthcare experience possible and that all starts with you.     Sincerely,     [facility]     Augusta Swanson RN Care Coordinator   [facility] / [facility]   Phone: [phone]  Email :  [email or web address]      Enclosed: I have enclosed a copy of the Complex Care Plan. This has helpful information and goals that we have talked about. Please keep this in an easy to access place to use as needed.

## 2020-08-19 NOTE — PROGRESS NOTES
Clinic Care Coordination Contact    Clinic Care Coordination Contact  OUTREACH    Referral Information:  Referral Source: IP Report    Primary Diagnosis: GI Disorders    Chief Complaint   Patient presents with     Clinic Care Coordination - Post Hospital     Clinic Care CoordinationRN     Clinic Care Coordination - Initial        Universal Utilization: Hospital admission 8/15-8/18/2020   Discharge Diagnoses     Acute Blood Loss Anemia, most likely due to slow GI bleed  Chronic iron deficiency anemia  History of gastroesophageal reflux disease, gastric and duodenal angiectasias    History of lung cancer and NHL in remission   History of recurrent PE  Chronic obstructive lung disease/emphysema on chronic oxygen at night  Acute kidney injury, resolved  CAD  Paroxysmal atrial fibrillation on Eliquis   Clinic Utilization  Difficulty keeping appointments:: No  Compliance Concerns: No  No-Show Concerns: No  No PCP office visit in Past Year: No  Utilization    Last refreshed: 8/19/2020  9:48 AM:  Hospital Admissions 3           Last refreshed: 8/19/2020  9:48 AM:  ED Visits 3           Last refreshed: 8/19/2020  9:48 AM:  No Show Count (past year) 1              Current as of: 8/19/2020  9:48 AM              Clinical Concerns:  Current Medical Concerns:  Patient reports he is feeling stronger since he as been taking the iron. Denies any blood in the stool  Patient agrees to call if experiencing ,increased weakness,blood in the stool, SOB, fever or abdominal pain   Uses Oxygen at 1 liter at night as needed  Current Behavioral Concerns: Not discussed Education Provided to patient: CC introductory letter and care plan sent via e-mail   Pain  Pain (GOAL):: No  Health Maintenance Reviewed: Due/Overdue   Health Maintenance Due   Topic Date Due     HF ACTION PLAN  11/03/1932     URINE DRUG SCREEN  11[birth year]03/1932     DTAP/TDAP/TD IMMUNIZATION (1 - Tdap) 11/03/1957     MEDICARE ANNUAL WELLNESS VISIT  11/03/1997     Clinical Pathway:  None    Medication Management:  Medication reconciliation status: Medications reviewed and reconciled.  Changed medications per patient report.    Functional Status:  Dependent ADLs:: Ambulation-cane, Ambulation-walker  Dependent IADLs:: Independent  Bed or wheelchair confined:: No  Mobility Status: Independent w/Device    Living Situation:  Current living arrangement:: I live in a private home with spouse    Lifestyle & Psychosocial Needs:  Lifestyle     Physical activity     Days per week: 0 days     Minutes per session: 0 min     Stress: Not at all     Social Needs     Financial resource strain: Not hard at all     Food insecurity     Worry: Never true     Inability: Never true     Transportation needs     Medical: No     Non-medical: No     Diet:: Regular  Inadequate nutrition (GOAL):: No  Tube Feeding: No  Inadequate activity/exercise (GOAL):: No  Significant changes in sleep pattern (GOAL): No  Transportation means:: Accessible car     Tenriism or spiritual beliefs that impact treatment:: No  Mental health DX:: No  Mental health management concern (GOAL):: No  Informal Support system:: Spouse   Socioeconomic History     Marital status:      Spouse name: Not on file     Number of children: Not on file     Years of education: Not on file     Highest education level: Not on file   Relationships     Social connections     Talks on phone: Twice a week     Gets together: Twice a week     Attends Voodoo service: Never     Active member of club or organization: No     Attends meetings of clubs or organizations: Never     Relationship status:      Intimate partner violence     Fear of current or ex partner: No     Emotionally abused: No     Physically abused: No     Forced sexual activity: No     Tobacco Use     Smoking status: Former Smoker     Packs/day: 2.00     Years: 55.00     Pack years: 110.00     Last attempt to quit: 1998     Years since quittin.5     Smokeless tobacco: Never Used    Substance and Sexual Activity     Alcohol use: Yes     Alcohol/week: 0.0 standard drinks     Comment: 1 drink per day     Drug use: No     Sexual activity: Not Currently     Partners: Female        Resources and Interventions:  Current Resources:      Community Resources: None  Supplies used at home:: Oxygen Tubing/Supplies  Equipment Currently Used at Home: cane, straight, walker, rolling    Advance Care Plan/Directive  Advanced Care Plans/Directives on file:: Yes  Type Advanced Care Plans/Directives: (Resuscitation guideline)  Advanced Care Plan/Directive Status: Not Applicable    Referrals Placed: None     Goals:   Goals        General    Monitoring (pt-stated)     Notes - Note created  8/19/2020  9:58 AM by Augusta Swanson RN    Goal Statement: I will seek medical help if experiencing ,Dizzines,shortness of breath,blood in stool ,abdominal pain or fever   Date Goal set: 8/19/2020  Barriers: None identified   Strengths: feeling stronger now that he is on iron   Date to Achieve By: 10/19/2020  Patient expressed understanding of goal: Yes  Action steps to achieve this goal:  1. I will *take iron as directed   2. I will keep future appointments with the Gastroenterologist   3. CC will follow up in 2-4 weeks             Patient/Caregiver understanding: Expresses good understanding of discharge instructions     Outreach Frequency: monthly  Future Appointments              Tomorrow  [facility]    In 3 weeks CS LAB [facility] MORIS Foster    In 1 month Karson Bishop MD [facility] MORIS Foster          Plan: Patient will continue to take iron as directed  Patient will make a future appointment with the GI provider in 1 month and keep   PCP hospital follow up 9/18  Agrees to call with increased symptoms of concern such as blood in stool,fever,SOB or  abdominal pain  CC will follow up in 2-4 weeks   [facility]     Augusta Swanson RN Care  Coordinator   [facility]   Phone: [phone]  Email :  [email or web address]

## 2020-08-19 NOTE — LETTER
Called by monitor room with sustaining HR in the 150s.  In to check on patient, patient standing, talking on phone seemed upset.  Asked patient to stop conversation, which he did, sat down, HR coming down in the 140s.  Patient denied shortness of breath, lightheadedness, chest discomfort.  Advised to call for RN if any of the aboe symptoms occures.  Dr. Joyner at bedside and made aware.   [facility]  Complex Care Plan  About Me:    Patient Name:  Hermilo Rodriguez    YOB: 1932  Age:         87 year old   [place] MRN:    [id number] Telephone Information:  Home Phone [phone]   Mobile [phone]       Address:  [address]  [facility] [address] Email address:  [email or web address]      Emergency Contact(s)    Name Relationship Lgl Grd Work Phone Home Phone Mobile Phone   1. RAND MARINELLI Spouse No   [phone]   2. MARC RODRIGUEZ Son No   [phone]           Primary language:  English     needed? No   [place] [employer]:  [phone] op. 1  Other communication barriers: None  Preferred Method of Communication:  Mail  Current living arrangement: I live in a private home with spouse  Mobility Status/ Medical Equipment: Independent w/Device    Health Maintenance  Health Maintenance Reviewed: Due/Overdue   Health Maintenance Due   Topic Date Due     HF ACTION PLAN  11/03/1932     URINE DRUG SCREEN  11/03/1932     DTAP/TDAP/TD IMMUNIZATION (1 - Tdap) 11/03/1957     MEDICARE ANNUAL WELLNESS VISIT  11/03/1997       My Access Plan  Medical Emergency 911   Primary Clinic Line [facility] - [phone]   24 Hour Appointment Line [phone] or  [phone]) (toll-free)   24 Hour Nurse Line [phone] (toll-free)   Preferred Urgent Care [facility], [phone]   Preferred Hospital [facility] [place] [phone]   Preferred Pharmacy [id number] [place], [place] - [address] Behavioral Health Crisis Line The National Suicide Prevention Lifeline at 1-653.366.9029 or 911       My Care Team Members  Patient Care Team       Relationship Specialty Notifications Start End    Karson Bishop MD PCP - General Internal Medicine  2/6/18     Phone: [phone] Pager: [phone] Fax: [phone] [address] [place] [address]    Carla  Margarita Hope MD MD Orthopedics  8/30/16     Phone: [phone] Fax: [phone]         MARGARITA LISA MD [address] [place] [address]    Karson Bishop MD Assigned PCP   2/18/18     Phone: [phone] Pager: [phone] Fax: [phone] [address] [place] [address]    Augusta Swanson, RN Lead Care Coordinator Primary Care - CC Admissions 8/19/20     Phone: [phone]                 My Care Plans  Self Management and Treatment Plan  Goals and (Comments)  Goals        General    Monitoring (pt-stated)     Notes - Note created  8/19/2020  9:58 AM by Augusta Swanson, RN    Goal Statement: I will seek medical help if experiencing ,Dizzines,shortness of breath,blood in stool ,abdominal pain or fever   Date Goal set: 8/19/2020  Barriers: None identified   Strengths: feeling stronger now that he is on iron   Date to Achieve By: 10/19/2020  Patient expressed understanding of goal: Yes  Action steps to achieve this goal:  1. I will *take iron as directed   2. I will keep future appointments with the Gastroenterologist   3. CC will follow up in 2-4 weeks              Action Plans on File:      COPD      Advance Care Plans/Directives Type:   Type Advanced Care Plans/Directives: (Resucitation truman)    My Medical and Care Information  Problem List   Patient Active Problem List   Diagnosis     Ventral hernia     Nonrheumatic aortic valve stenosis     GERD (gastroesophageal reflux disease)     Non Hodgkin's lymphoma (H): 2013     Microscopic hematuria     Health Care Home     CARDIOVASCULAR SCREENING; LDL GOAL LESS THAN 130     History of colonic polyps     Neuropathy     Anemia due to blood loss, acute     Paroxysmal atrial fibrillation (H)     Need for SBE (subacute bacterial endocarditis) prophylaxis     RBBB (right bundle branch block)     Gastrointestinal hemorrhage with melena     Primary osteoarthritis of both knees     Primary osteoarthritis of left hip     Pulmonary embolism,  bilateral (H)     S/P IVC filter     Long-term (current) use of anticoagulants [Z79.01]     Benign neoplasm of colon, unspecified part of colon     Pulmonary nodules     Benign neoplasm of colon     Primary osteoarthritis of left knee     Essential hypertension with goal blood pressure less than 140/90     Non-Hodgkin's lymphoma, unspecified body region, unspecified non-Hodgkin lymphoma type (H)     Other chronic pulmonary embolism without acute cor pulmonale (H)     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     Aftercare following left knee joint replacement surgery     Drainage from wound: left Knee     Lower extremity edema     Leg edema, left     S/P total knee arthroplasty     ACP (advance care planning)     Hyperlipidemia LDL goal <130     Chronic obstructive pulmonary disease, unspecified COPD type (H)     Iron deficiency anemia due to chronic blood loss     Spongiotic dermatitis     Nodule of lower lobe of right lung     Rotator cuff tendinitis, left     S/P rotator cuff surgery     Malignant neoplasm of lung, unspecified laterality, unspecified part of lung (H)     Bullous pemphigoid     Centrilobular emphysema (H)     Heart murmur, systolic     History of non-Hodgkin's lymphoma     Left hip pain     Primary malignant neoplasm of right lung (H)     History of aortic valve replacement with tissue graft     Shoulder joint pain     Generalized weakness      Current Medications and Allergies:    Current Outpatient Medications   Medication     acetaminophen (TYLENOL) 325 MG tablet     albuterol (PROAIR HFA/PROVENTIL HFA/VENTOLIN HFA) 108 (90 Base) MCG/ACT Inhaler     apixaban ANTICOAGULANT (ELIQUIS ANTICOAGULANT) 5 MG tablet     atorvastatin (LIPITOR) 10 MG tablet     famotidine (PEPCID) 40 MG tablet     ferrous sulfate (FE TABS) 325 (65 Fe) MG EC tablet     gabapentin (NEURONTIN) 300 MG capsule     hydrOXYzine (ATARAX) 25 MG tablet     hydrOXYzine (ATARAX) 25 MG tablet     metoprolol succinate ER  (TOPROL-XL) 25 MG 24 hr tablet     order for DME     pantoprazole (PROTONIX) 40 MG EC tablet     tiotropium (SPIRIVA HANDIHALER) 18 MCG capsule     triamcinolone (KENALOG) 0.1 % external cream     No current facility-administered medications for this visit.        Care Coordination Start Date: 8/19/2020   Frequency of Care Coordination: monthly   Form Last Updated: 08/19/2020

## 2020-08-19 NOTE — TELEPHONE ENCOUNTER
See care coordination encounter.  This encounter closed  [facility]     Augusta Swanson  RN Care Coordinator   [facility] [place]   Phone: [phone]  Email :  [email or web address]

## 2020-08-20 ENCOUNTER — INFUSION THERAPY VISIT (OUTPATIENT)
Dept: INFUSION THERAPY | Facility: CLINIC | Age: 85
End: 2020-08-20
Attending: INTERNAL MEDICINE
Payer: COMMERCIAL

## 2020-08-20 VITALS
DIASTOLIC BLOOD PRESSURE: 52 MMHG | HEART RATE: 78 BPM | TEMPERATURE: 98.2 F | OXYGEN SATURATION: 94 % | RESPIRATION RATE: 20 BRPM | SYSTOLIC BLOOD PRESSURE: 105 MMHG

## 2020-08-20 DIAGNOSIS — D50.0 IRON DEFICIENCY ANEMIA DUE TO CHRONIC BLOOD LOSS: Primary | ICD-10-CM

## 2020-08-20 DIAGNOSIS — D62 ANEMIA DUE TO BLOOD LOSS, ACUTE: ICD-10-CM

## 2020-08-20 PROCEDURE — 96365 THER/PROPH/DIAG IV INF INIT: CPT

## 2020-08-20 PROCEDURE — 25800030 ZZH RX IP 258 OP 636: Performed by: INTERNAL MEDICINE

## 2020-08-20 PROCEDURE — 25000128 H RX IP 250 OP 636: Performed by: INTERNAL MEDICINE

## 2020-08-20 RX ORDER — HEPARIN SODIUM,PORCINE 10 UNIT/ML
5 VIAL (ML) INTRAVENOUS
Status: CANCELLED | OUTPATIENT
Start: 2020-08-27

## 2020-08-20 RX ORDER — HEPARIN SODIUM (PORCINE) LOCK FLUSH IV SOLN 100 UNIT/ML 100 UNIT/ML
5 SOLUTION INTRAVENOUS
Status: CANCELLED | OUTPATIENT
Start: 2020-08-27

## 2020-08-20 RX ADMIN — FERRIC CARBOXYMALTOSE INJECTION 750 MG: 50 INJECTION, SOLUTION INTRAVENOUS at 14:22

## 2020-08-20 RX ADMIN — SODIUM CHLORIDE 250 ML: 9 INJECTION, SOLUTION INTRAVENOUS at 14:22

## 2020-08-20 ASSESSMENT — PAIN SCALES - GENERAL: PAINLEVEL: NO PAIN (0)

## 2020-08-20 NOTE — PROGRESS NOTES
Infusion Nursing Note:  Hermilo [facility] presents today for Injectafer.    Patient seen by provider today: No   present during visit today: Not Applicable.    Note: N/A.    Intravenous Access:  Peripheral IV placed.    Treatment Conditions:  Not Applicable.      Post Infusion Assessment:  Patient tolerated infusion without incident.  Patient observed for 30 minutes post Injectafer per protocol.  Blood return noted pre and post infusion.  Site patent and intact, free from redness, edema or discomfort.  No evidence of extravasations.  Access discontinued per protocol.       Discharge Plan:   Patient declined prescription refills.  Discharge instructions reviewed with: Patient.  Patient verbalized understanding of discharge instructions and all questions answered.  Copy of AVS reviewed with patient.  Patient will return as scheduled for next appointment.  Patient discharged in stable condition accompanied by: self.  Departure Mode: Ambulatory.    Crissy May RN

## 2020-08-21 LAB
BACTERIA SPEC CULT: NO GROWTH
BACTERIA SPEC CULT: NO GROWTH
SPECIMEN SOURCE: NORMAL
SPECIMEN SOURCE: NORMAL

## 2020-08-25 ENCOUNTER — TELEPHONE (OUTPATIENT)
Dept: OTHER | Facility: CLINIC | Age: 85
End: 2020-08-25

## 2020-08-25 NOTE — TELEPHONE ENCOUNTER
Patient called stating that he is continuing to experience bilateral lower extremity leg swelling. He is not complaining of pain. He states he is not wearing his compression stockings but is elevate his legs. I asked that he wear his compression stockings and continue to elevate his legs and call back with increased concerns of pain.    Petra CROCKETT, RN    [facility]  [facility]ascular [facility]  Office: [phone]  Fax: [phone]

## 2020-08-26 NOTE — TELEPHONE ENCOUNTER
Hermilo called back with increased concerns of leg swelling.  Discussed with Evi WHITMAN PA-C who is willing to see patient Friday to assess leg swelling. Patient is scheduled.     Petra CROCKETT, RN    [facility]  Vascular [facility]  Office: [phone]  Fax: [phone]

## 2020-08-28 ENCOUNTER — OFFICE VISIT (OUTPATIENT)
Dept: OTHER | Facility: CLINIC | Age: 85
End: 2020-08-28
Attending: PHYSICIAN ASSISTANT
Payer: COMMERCIAL

## 2020-08-28 ENCOUNTER — TELEPHONE (OUTPATIENT)
Dept: NURSING | Facility: CLINIC | Age: 85
End: 2020-08-28

## 2020-08-28 ENCOUNTER — HOSPITAL ENCOUNTER (OUTPATIENT)
Dept: LAB | Facility: CLINIC | Age: 85
End: 2020-08-28
Attending: PHYSICIAN ASSISTANT
Payer: COMMERCIAL

## 2020-08-28 VITALS
HEIGHT: 70 IN | SYSTOLIC BLOOD PRESSURE: 109 MMHG | DIASTOLIC BLOOD PRESSURE: 65 MMHG | WEIGHT: 172 LBS | BODY MASS INDEX: 24.62 KG/M2 | OXYGEN SATURATION: 93 % | RESPIRATION RATE: 14 BRPM | HEART RATE: 56 BPM

## 2020-08-28 DIAGNOSIS — I26.99 ACUTE PULMONARY EMBOLISM WITHOUT ACUTE COR PULMONALE, UNSPECIFIED PULMONARY EMBOLISM TYPE (H): ICD-10-CM

## 2020-08-28 DIAGNOSIS — D50.9 IRON DEFICIENCY ANEMIA, UNSPECIFIED IRON DEFICIENCY ANEMIA TYPE: ICD-10-CM

## 2020-08-28 DIAGNOSIS — I89.0 LYMPHEDEMA: Primary | ICD-10-CM

## 2020-08-28 DIAGNOSIS — K90.89 POOR IRON ABSORPTION: ICD-10-CM

## 2020-08-28 DIAGNOSIS — I89.0 LYMPHEDEMA: ICD-10-CM

## 2020-08-28 LAB
ANISOCYTOSIS BLD QL SMEAR: ABNORMAL
DIFFERENTIAL METHOD BLD: ABNORMAL
ELLIPTOCYTES BLD QL SMEAR: SLIGHT
EOSINOPHIL # BLD AUTO: 0.9 10E9/L (ref 0–0.7)
EOSINOPHIL NFR BLD AUTO: 10 %
ERYTHROCYTE [DISTWIDTH] IN BLOOD BY AUTOMATED COUNT: 20.7 % (ref 10–15)
HCT VFR BLD AUTO: 29.1 % (ref 40–53)
HGB BLD-MCNC: 8.5 G/DL (ref 13.3–17.7)
IRON SATN MFR SERPL: 26 % (ref 15–46)
IRON SERPL-MCNC: 70 UG/DL (ref 35–180)
LYMPHOCYTES # BLD AUTO: 1.1 10E9/L (ref 0.8–5.3)
LYMPHOCYTES NFR BLD AUTO: 12 %
MCH RBC QN AUTO: 25.4 PG (ref 26.5–33)
MCHC RBC AUTO-ENTMCNC: 29.2 G/DL (ref 31.5–36.5)
MCV RBC AUTO: 87 FL (ref 78–100)
MONOCYTES # BLD AUTO: 0.9 10E9/L (ref 0–1.3)
MONOCYTES NFR BLD AUTO: 10 %
NEUTROPHILS # BLD AUTO: 6.1 10E9/L (ref 1.6–8.3)
NEUTROPHILS NFR BLD AUTO: 68 %
NT-PROBNP SERPL-MCNC: 1233 PG/ML (ref 0–450)
PLATELET # BLD AUTO: 248 10E9/L (ref 150–450)
RBC # BLD AUTO: 3.34 10E12/L (ref 4.4–5.9)
RBC INCLUSIONS BLD: SLIGHT
TIBC SERPL-MCNC: 265 UG/DL (ref 240–430)
WBC # BLD AUTO: 9 10E9/L (ref 4–11)

## 2020-08-28 PROCEDURE — 83540 ASSAY OF IRON: CPT | Performed by: INTERNAL MEDICINE

## 2020-08-28 PROCEDURE — 83880 ASSAY OF NATRIURETIC PEPTIDE: CPT | Performed by: INTERNAL MEDICINE

## 2020-08-28 PROCEDURE — 83550 IRON BINDING TEST: CPT | Performed by: INTERNAL MEDICINE

## 2020-08-28 PROCEDURE — 99214 OFFICE O/P EST MOD 30 MIN: CPT | Mod: ZP | Performed by: PHYSICIAN ASSISTANT

## 2020-08-28 PROCEDURE — 36415 COLL VENOUS BLD VENIPUNCTURE: CPT | Performed by: INTERNAL MEDICINE

## 2020-08-28 PROCEDURE — G0463 HOSPITAL OUTPT CLINIC VISIT: HCPCS

## 2020-08-28 PROCEDURE — 85025 COMPLETE CBC W/AUTO DIFF WBC: CPT | Performed by: INTERNAL MEDICINE

## 2020-08-28 RX ORDER — TORSEMIDE 10 MG/1
10 TABLET ORAL DAILY
Qty: 30 TABLET | Refills: 0 | Status: SHIPPED | OUTPATIENT
Start: 2020-08-28 | End: 2020-09-01 | Stop reason: SINTOL

## 2020-08-28 ASSESSMENT — MIFFLIN-ST. JEOR: SCORE: 1461.44

## 2020-08-28 NOTE — PROGRESS NOTES
"[facility] VASCULAR [facility] CENTER  VASCULAR MEDICINE     FOLLOW-UP VISIT      PRIMARY HEALTH CARE PROVIDER:  Karson Bishop      REASON FOR VISIT:  Bilateral lower extremity edema      HPI: Hermilo Velasquez is a 87 year old male who has followed in our [facility] for some time now due to history of bilateral lower extremity DVT/PE. 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 Given his history of recurrent PE, with his most recent being in 3/2020, it was felt that his risk from PE was much greater than a slow GI bleed and his Eliquis was resumed. He has been doing well and is tolerating his Eliquis with no signs of bleeding.     He was due to follow up with Dr. Ospina of our Vascular Medicine team on 8/31/20. However, he called in wanting to be seen sooner as he was worried about bilateral lower extremity (mainly feet and ankle) swelling. He states his swelling has been present for the past 6-8 months, relatively unchanged. He was started on Gabapentin for peripheral neuropathy this past year, which he describes as pins and needles and numbness in both feet. He was also prescribed a \"water pill\" by his primary care clinic which had helped the swelling but this was stopped when he developed hypotension. He follows with Dr. Grijalva for Cardiology, last seen 11/2019 and not due to see again until 11/2020. Most recent echocardiogram 3/2020 showed normal EF, good gradient of bioprosthetic aortic valve, and mild LV hypertrophy, all unchanged from 2018. His swelling seems to improve some at night when legs are elevated. He told me he is wearing his compression stockings but told the nurses yesterday that he is not. He has " a history of bilateral lower extremity DVT in 2016.       PAST MEDICAL HISTORY  Past Medical History:   Diagnosis Date     Aortic stenosis     Severe AS, 9/2015 AVR - [facility] TRIFECTA Bovine bioprosthesis 25MM TF-25A     Atrial fibrillation (H)     9/2015 Paroxysmal post op Afib - discharged on Warfarin and a beta blocker     Deep vein thrombosis (H)      GERD (gastroesophageal reflux disease)      Heart murmur      Monoclonal gammopathy     plasmacyte prominent causing monoclonal gammopathy     Need for SBE (subacute bacterial endocarditis) prophylaxis      Neuropathy      Other and unspecified hyperlipidemia      Other malignant lymphomas     non hodgkin's lymphoma     RBBB (right bundle branch block)      Severe sepsis with acute organ dysfunction (H) 11/16/2015     Unspecified hereditary and idiopathic peripheral neuropathy        CURRENT MEDICATIONS  acetaminophen (TYLENOL) 325 MG tablet, Take 2 tablets by mouth every 4 hours as needed for mild pain   albuterol (PROAIR HFA/PROVENTIL HFA/VENTOLIN HFA) 108 (90 Base) MCG/ACT Inhaler, Inhale 1-2 puffs into the lungs every 6 hours as needed for shortness of breath / dyspnea or wheezing  apixaban ANTICOAGULANT (ELIQUIS ANTICOAGULANT) 5 MG tablet, Take 1 tablet (5 mg) by mouth 2 times daily  atorvastatin (LIPITOR) 10 MG tablet, Take 1 tablet (10 mg) by mouth daily  famotidine (PEPCID) 40 MG tablet, Take 1 tablet (40 mg) by mouth 2 times daily  ferrous sulfate (FE TABS) 325 (65 Fe) MG EC tablet, Take 1 tablet (325 mg) by mouth three times a week  gabapentin (NEURONTIN) 300 MG capsule, Take 900 mg by mouth At Bedtime  hydrOXYzine (ATARAX) 25 MG tablet, Take 50 mg by mouth At Bedtime  hydrOXYzine (ATARAX) 25 MG tablet, Take 2 tablets (50 mg) by mouth every 6 hours as needed  metoprolol succinate ER (TOPROL-XL) 25 MG 24 hr tablet, Take 0.5 tablets (12.5 mg) by mouth daily  order for DME, Equipment being ordered: Right neoprene knee brace.  ( Fax to [facility] fax   [phone])  pantoprazole (PROTONIX) 40 MG EC tablet, Take 1 tablet (40 mg) by mouth 2 times daily (before meals)  tiotropium (SPIRIVA HANDIHALER) 18 MCG capsule, Inhale 1 capsule (18 mcg) into the lungs daily  triamcinolone (KENALOG) 0.1 % external cream, Apply topically 2 times daily as needed for irritation    No current facility-administered medications on file prior to visit.       PAST SURGICAL HISTORY:  Past Surgical History:   Procedure Laterality Date     APPENDECTOMY       AS TOTAL KNEE ARTHROPLASTY       BACK SURGERY       ESOPHAGOSCOPY, GASTROSCOPY, DUODENOSCOPY (EGD), COMBINED N/A 11/28/2015    Procedure: COMBINED ESOPHAGOSCOPY, GASTROSCOPY, DUODENOSCOPY (EGD);  Surgeon: Danis Castillo MD;  Location:  GI     ESOPHAGOSCOPY, GASTROSCOPY, DUODENOSCOPY (EGD), COMBINED N/A 7/26/2018    Procedure: COMBINED ESOPHAGOSCOPY, GASTROSCOPY, DUODENOSCOPY (EGD);;  Surgeon: Toby Dong DO;  Location:  GI     ESOPHAGOSCOPY, GASTROSCOPY, DUODENOSCOPY (EGD), COMBINED N/A 8/17/2020    Procedure: ESOPHAGOGASTRODUODENOSCOPY (EGD);  Surgeon: Herrera Henry MD;  Location:  GI     HERNIA REPAIR  2006     HERNIORRHAPHY VENTRAL  4/17/2013    Procedure: HERNIORRHAPHY VENTRAL;  VENTRAL HERNIA REPAIR WITH MESH;  Surgeon: Patel Guzman MD;  Location:  OR     KNEE SURGERY      arthroscopic right knee surgery      LOBECTOMY LUNG Right 3/26/2019    Procedure: POSSIBLE LOBECTOMY LUNG;  Surgeon: Jose A Vasques MD;  Location:  OR     REPAIR LIGAMENT ANKLE  2/23/2012    Procedure:REPAIR LIGAMENT ANKLE; LEFT TARSAL TUNNEL RELEASE OF KNOT OF ARIEL RELEASE; Surgeon:SAUL PUENTE; Location: OR     REPLACE VALVE AORTIC N/A 9/3/2015    Procedure: REPLACE VALVE AORTIC;  Surgeon: Antonino Mitchell MD;  Location:  OR     ROTATOR CUFF REPAIR RT/LT      bilateral     SPINE SURGERY      3 spine surgeries     THORACOTOMY, WEDGE RESECTION LUNG, COMBINED Right 3/26/2019    Procedure:  "RIGHT THORACOTOMY, WEDGE RESECTION, RIGHT LOWER LOBE LUNG NODULE,;  Surgeon: Jose A Vasques MD;  Location: [facility] OR     TONSILLECTOMY       TURP         ALLERGIES     Allergies   Allergen Reactions     Lidocaine Blisters     Allergy to lidocaine ointment     Omeprazole Itching     Pantoprazole Itching     Prevacid [Lansoprazole] Itching     Lasix [Furosemide] Rash     Penicillins Rash     \"broke out from injection\" 60 yrs ago         FAMILY HISTORY  Family History   Problem Relation Age of Onset     C.A.D. Father      Emphysema Father      Melanoma No family hx of      Skin Cancer No family hx of        SOCIAL HISTORY  Social History     Socioeconomic History     Marital status:      Spouse name: Not on file     Number of children: Not on file     Years of education: Not on file     Highest education level: Not on file   Occupational History     Not on file   Social Needs     Financial resource strain: Not hard at all     Food insecurity     Worry: Never true     Inability: Never true     Transportation needs     Medical: No     Non-medical: No   Tobacco Use     Smoking status: Former Smoker     Packs/day: 2.00     Years: 55.00     Pack years: 110.00     Last attempt to quit: 1998     Years since quittin.6     Smokeless tobacco: Never Used   Substance and Sexual Activity     Alcohol use: Yes     Alcohol/week: 0.0 standard drinks     Comment: 1 drink per day     Drug use: No     Sexual activity: Not Currently     Partners: Female   Lifestyle     Physical activity     Days per week: 0 days     Minutes per session: 0 min     Stress: Not at all   Relationships     Social connections     Talks on phone: Twice a week     Gets together: Twice a week     Attends Nondenominational service: Never     Active member of club or organization: No     Attends meetings of clubs or organizations: Never     Relationship status:      Intimate partner violence     Fear of current or ex partner: No     Emotionally " abused: No     Physically abused: No     Forced sexual activity: No   Other Topics Concern     Parent/sibling w/ CABG, MI or angioplasty before 65F 55M? Not Asked      Service Not Asked     Blood Transfusions Not Asked     Caffeine Concern No     Comment: occ     Occupational Exposure Not Asked     Hobby Hazards Not Asked     Sleep Concern Not Asked     Stress Concern Not Asked     Weight Concern Not Asked     Special Diet No     Back Care Not Asked     Exercise No     Bike Helmet Not Asked     Seat Belt Yes     Self-Exams Not Asked   Social History Narrative    , [place]    Retired  - self employed       ROS:   General: No change in weight, sleep or appetite.  Normal energy.  No fever or chills  Eyes: Negative for vision changes or eye problems  ENT: No problems with ears, nose or throat.  No difficulty swallowing.  Resp: No coughing, wheezing or shortness of breath  CV: No chest pains or palpitations  GI: No nausea, vomiting,  heartburn, abdominal pain, diarrhea, constipation or change in bowel habits  : No urinary frequency or dysuria, bladder or kidney problems  Musculoskeletal: No significant muscle or joint pains  Neurologic: No headaches, numbness, tingling, weakness, problems with balance or coordination. Does have numbness and tingling in the bottom of both feet (peripheral neuropathy).   Psychiatric: No problems with anxiety, depression or mental health  Heme/immune/allergy: No history of bleeding or clotting problems or anemia.  No allergies or immune system problems  Endocrine: No history of thyroid disease, diabetes or other endocrine disorders  Skin: No rashes,worrisome lesions or skin problems  Vascular:  No claudication, lifestyle limiting or otherwise; no ischemic rest pain; no non-healing ulcers. No weakness, No loss of sensation    EXAM:  /65 (BP Location: Left arm, Patient Position: Chair, Cuff Size: Adult Regular)   Pulse 56   Resp 14   " Ht 1.778 m (5' 10\")   Wt 78 kg (172 lb)   SpO2 93%   BMI 24.68 kg/m    In general, the patient is a pleasant male in no apparent distress.    HEENT: NC/AT.  PERRLA.  EOMI.  Sclerae white, not injected.  Nares clear.  Pharynx without erythema or exudate.  Dentition intact.    Neck: No adenopathy. Carotids +2/2 bilaterally without bruits.   Heart: RRR. Normal S1, S2 splits physiologically. No murmur, rub, click, or gallop.   Lungs: CTA.  No ronchi, wheezes, rales.    Abdomen: Soft, nontender, nondistended.   Extremities: Bilateral lower extremity/bipedal edema. Stemmer sign bilateral feet. Some discoloration of the feet/legs from chronic venous insufficiency. Palpable DP pulses bilaterally.       Labs:  LIPID RESULTS:  Lab Results   Component Value Date    CHOL 107 06/18/2020    HDL 32 (L) 06/18/2020    LDL 53 06/18/2020    TRIG 109 06/18/2020    CHOLHDLRATIO 3.2 05/13/2014       LIVER ENZYME RESULTS:  Lab Results   Component Value Date    AST 7 08/16/2020    ALT 8 08/16/2020       CBC RESULTS:  Lab Results   Component Value Date    WBC 9.0 08/28/2020    RBC 3.34 (L) 08/28/2020    HGB 8.5 (L) 08/28/2020    HCT 29.1 (L) 08/28/2020    MCV 87 08/28/2020    MCH 25.4 (L) 08/28/2020    MCHC 29.2 (L) 08/28/2020    RDW 20.7 (H) 08/28/2020     08/28/2020       BMP RESULTS:  Lab Results   Component Value Date     08/17/2020    POTASSIUM 3.9 08/17/2020    CHLORIDE 108 08/17/2020    CO2 31 08/17/2020    ANIONGAP 1 (L) 08/17/2020    GLC 92 08/17/2020    BUN 12 08/17/2020    CR 1.09 08/17/2020    GFRESTIMATED 60 (L) 08/17/2020    GFRESTBLACK 70 08/17/2020    AMRITA 8.3 (L) 08/17/2020        A1C RESULTS:  Lab Results   Component Value Date    A1C 5.8 (H) 07/31/2018       THYROID RESULTS:  Lab Results   Component Value Date    TSH 2.88 07/31/2018           Assessment and Plan:   Bilateral lower extremity edema in setting of known history of bilateral lower extremity DVT    Comment:   -Swelling present for at least 6-8 " months.   -This could represent post-thrombotic syndrome changes and resultant lymphedema.   -Last echo 3/2020 unchanged from 2018.   -lower extremity venous duplex 8/17/20 with no DVT.  -BNP elevated to 1,500 on 8/11/20, rechecked today and 1,300.   -Unclear if he is truly wearing compression stockings.   -Responded well to Demedex 10 mg daily for a week 7/14/20-7/22/20, then when restarted on this but at higher dose of 20 mg daily 8/11/15 had to stop from hypotension.     Plan:   -Continue Eliquis.   -Will restart Demadex due to symptomatic relief he did get from this, but at the lower dose he seemed to tolerate of 10 mg daily. He should monitor his blood pressure at home and call us if systolic number is consistently above 100.  -Encouraged him to elevate legs and utilize compression stockings.   -Refer on to Lymphedema clinic.   -Follow up with his PCP Dr. Bishop as already scheduled.   -Will push back his scheduled appointment with Dr. Ospina on 8/31/20 to 9/28/20.    Discussed plan with Dr. Ospina.   Patient was advised to call with any questions or concerns.     Evi Anthony PA-C

## 2020-08-28 NOTE — LETTER
August 31, 2020      Hermilo Velasquez  [address] [facility] [address]        Dear ,    The following letter pertains to your most recent diagnostic tests:     Your iron stores are adequate.  The hemoglobin is improving.   Additional iron infusion are not likely to help.  Your body needs more time to recover the blood cells lost when you were bleeding.  This can take up to 3 months.  Provided your are feeling OK, it would make sense to recheck your hemoglobin in November or December.  I have an appointment with you in few weeks so we can discuss further then too.       Resulted Orders   Iron and iron binding capacity   Result Value Ref Range    Iron 70 35 - 180 ug/dL    Iron Binding Cap 265 240 - 430 ug/dL    Iron Saturation Index 26 15 - 46 %       If you have any questions or concerns, please call the clinic at the number listed above.       Sincerely,     Dr. Bishop

## 2020-08-28 NOTE — PATIENT INSTRUCTIONS
1. Go downstairs to the lab and have your labs drawn. We will call you with results if they need attention.     2. A referral was placed for the lymphedema clinic. Please see them for the swelling in your feet.     3. Start taking Demadex (Torsemide) 10 mg daily for the swelling in your feet. Please continue to monitor your blood pressure at home and call if the top number is consistently below 100.     4. Follow up with Dr. Bishop as scheduled.     5. Follow up with Dr. Ospina on 9/28/20.

## 2020-08-28 NOTE — PROGRESS NOTES
"Hermilo Velasquez is a 87 year old male who presents for:  Chief Complaint   Patient presents with     RECHECK     [id number]; [place] patient to be seen for increased leg swelling. time ok by Evi. Wellness screen complete. 8/26/20 co        Vitals:    Vitals:    08/28/20 1000 08/28/20 1001   BP: 113/52 109/65   BP Location: Right arm Left arm   Patient Position: Chair Chair   Cuff Size: Adult Regular Adult Regular   Pulse: 56    Resp: 14    SpO2: 93%    Weight: 172 lb (78 kg)    Height: 5' 10\" (1.778 m)        BMI:  Estimated body mass index is 24.68 kg/m  as calculated from the following:    Height as of this encounter: 5' 10\" (1.778 m).    Weight as of this encounter: 172 lb (78 kg).    Pain Score:  Data Unavailable        Jazmin Giordano CMA    "

## 2020-08-29 NOTE — RESULT ENCOUNTER NOTE
The following letter pertains to your most recent diagnostic tests:    Your iron stores are adequate.  The hemoglobin is improving.   Additional iron infusion are not likely to help.  Your body needs more time to recover the blood cells lost when you were bleeding.  This can take up to 3 months.  Provided your are feeling OK, it would make sense to recheck your hemoglobin in November or December.  I have an appointment with you in few weeks so we can discuss further then too.      Sincerely,    Dr. Bishop

## 2020-08-29 NOTE — TELEPHONE ENCOUNTER
"Clinic Action Needed:ZAHRA  Reason for Call:\"I was seen today and she wanted me to monitor my blood pressure and call back. Tonight it was 74/43 P 83. I feel fine. I will continue to monitor it.\" See OV note today per epic.  Patient Recommendations/Teaching:Will route message to PCP. No triage was needed  Routed to:PCP  Tran Rondon RN [facility]    COVID 19 Nurse Triage Plan/Patient Instructions    Please be aware that novel coronavirus (COVID-19) may be circulating in the community. If you develop symptoms such as fever, cough, or SOB or if you have concerns about the presence of another infection including coronavirus (COVID-19), please contact your health care provider or visit [email or web address].     Disposition/Instructions    Additional COVID19 information to add for patients.   How can I protect others?  If you have symptoms (fever, cough, body aches or trouble breathing): Stay home and away from others (self-isolate) until:    At least 10 days have passed since your symptoms started. And     You ve had no fever--and no medicine that reduces fever--for 1 full day (24 hours). And      Your other symptoms have resolved (gotten better).     If you don t have symptoms, but a test showed that you have COVID-19 (you tested positive):    Stay home and away from others (self-isolate) until at least 10 days have passed since the date of your first positive COVID-19 test.    During this time:    Stay in your own room, even for meals. Use your own bathroom if you can.     Stay away from others in your home. No hugging, kissing or shaking hands. No visitors.    Don t go to work, school or anywhere else.     Clean  high touch  surfaces often (doorknobs, counters, handles, etc.). Use a household cleaning spray or wipes. You ll find a full list on the EPA website:  www.epa.gov/pesticide-registration/list-n-disinfectants-use-against-sars-cov-2.    Cover your mouth and nose with a mask, tissue or washcloth to " avoid spreading germs.    Wash your hands and face often. Use soap and water.    Caregivers in these groups are at risk for severe illness due to COVID-19:  o People 65 years and older  o People who live in a nursing home or long-term care facility  o People with chronic disease (lung, heart, cancer, diabetes, kidney, liver, immunologic)  o People who have a weakened immune system, including those who:  - Are in cancer treatment  - Take medicine that weakens the immune system, such as corticosteroids  - Had a bone marrow or organ transplant  - Have an immune deficiency  - Have poorly controlled HIV or AIDS  - Are obese (body mass index of 40 or higher)  - Smoke regularly    Caregivers should wear gloves while washing dishes, handling laundry and cleaning bedrooms and bathrooms.    Use caution when washing and drying laundry: Don t shake dirty laundry, and use the warmest water setting that you can.    For more tips, go to www.cdc.gov/coronavirus/2019-ncov/downloads/10Things.pdf.    How can I take care of myself?  1. Get lots of rest. Drink extra fluids (unless a doctor has told you not to).     2. Take Tylenol (acetaminophen) for fever or pain. If you have liver or kidney problems, ask your family doctor if it s okay to take Tylenol.     Adults can take either:     650 mg (two 325 mg pills) every 4 to 6 hours, or     1,000 mg (two 500 mg pills) every 8 hours as needed.     Note: Don t take more than 3,000 mg in one day.   Acetaminophen is found in many medicines (both prescribed and over-the-counter medicines). Read all labels to be sure you don t take too much.     For children, check the Tylenol bottle for the right dose. The dose is based on the child s age or weight.    3. If you have other health problems (like cancer, heart failure, an organ transplant or severe kidney disease): Call your specialty clinic if you don t feel better in the next 2 days.    4. Know when to call 911: Emergency warning signs  include:    Trouble breathing or shortness of breath    Pain or pressure in the chest that doesn t go away    Feeling confused like you haven t felt before, or not being able to wake up    Bluish-colored lips or face    What are the symptoms of COVID-19?     The most common symptoms are cough, fever and trouble breathing.     Less common symptoms include body aches, chills, diarrhea (loose, watery poops), fatigue (feeling very tired), headache, runny nose, sore throat and loss of smell.    COVID-19 can cause severe coughing (bronchitis) and lung infection (pneumonia).    How does it spread?     The virus may spread when a person coughs or sneezes into the air. The virus can travel about 6 feet this way, and it can live on surfaces.      Common  (household disinfectants) will kill the virus.    Who is at risk?  Anyone can catch COVID-19 if they re around someone who has the virus.    How can others protect themselves?     Stay away from people who have COVID-19 (or symptoms of COVID-19).    Wash hands often with soap and water. Or, use hand  with at least 60% alcohol.    Avoid touching the eyes, nose or mouth.     Wear a face mask when you go out in public, when sick or when caring for a sick person.    Where can I get more information?    [facility]: About COVID-19: [email or web address]    CDC: What to Do If You re Sick: www.cdc.gov/coronavirus/2019-ncov/about/steps-when-sick.html    CDC: Ending Home Isolation: www.cdc.gov/coronavirus/2019-ncov/hcp/disposition-in-home-patients.html     CDC: Caring for Someone: www.cdc.gov/coronavirus/2019-ncov/if-you-are-sick/care-for-someone.html     [facility]: Interim Guidance for Hospital Discharge to Home: www.health.[facility].[place].us/diseases/coronavirus/hcp/hospdischarge.pdf    [facility] clinical trials (COVID-19 research studies): clinicalaffairs.[facility]/n-clinical-trials     Below are the COVID-19 hotlines at the [facility]  [facility] ([facility]). Interpreters are available.   o For health questions: Call 690-728-3144 or 1-386.387.4262 (7 a.m. to 7 p.m.)  o For questions about schools and childcare: Call 322-561-6420 or 1-256.984.4743 (7 a.m. to 7 p.m.)          Thank you for taking steps to prevent the spread of this virus.  o Limit your contact with others.  o Wear a simple mask to cover your cough.  o Wash your hands well and often.    Resources    [facility]: About COVID-19: [email or web address]    CDC: What to Do If You're Sick: www.cdc.gov/coronavirus/2019-ncov/about/steps-when-sick.html    CDC: Ending Home Isolation: www.cdc.gov/coronavirus/2019-ncov/hcp/disposition-in-home-patients.html     CDC: Caring for Someone: www.cdc.gov/coronavirus/2019-ncov/if-you-are-sick/care-for-someone.html     [facility]: Interim Guidance for Hospital Discharge to Home: www.health.[facility].mn./diseases/coronavirus/hcp/hospdischarge.pdf    [facility] clinical trials (COVID-19 research studies): clinicalaffairs.[facility]/n-clinical-trials     Below are the COVID-19 hotlines at the [place] [facility] ([facility]). Interpreters are available.   o For health questions: Call 683-267-3396 or 1-496.691.1432 (7 a.m. to 7 p.m.)  o For questions about schools and childcare: Call 874-669-1496 or 1-752.269.6803 (7 a.m. to 7 p.m.)

## 2020-09-01 ENCOUNTER — TELEPHONE (OUTPATIENT)
Dept: OTHER | Facility: CLINIC | Age: 85
End: 2020-09-01

## 2020-09-01 NOTE — TELEPHONE ENCOUNTER
Patient called to report his BP at 11 am was 84/46 and that he has not taken torsemide (DEMADEX) 10 MG tablet today.  He reports is feeling slightly light headed but has not had LOC with no other symptoms. I advised him to present to the ER for worsening symptoms of lightheaded, LOC, nausea, headache or falls. He agrees. We discussed he stop torsemide (DEMADEX) 10 MG tablet and follow up with his PCP as scheduled. He verbalized understanding. I asked he call me tomorrow if his BP remains low.    Petra ERVINN, RN    [facility]  Office: [phone]  Fax: [phone]

## 2020-09-01 NOTE — TELEPHONE ENCOUNTER
Patient had just been restarted on a lower dose of Torsemide of 10 mg daily. 20 mg daily resulted in hypotension. Advised that he hold this, follow all other instructions from the visit, and follow up with Dr. Bishop (PCP) as scheduled.     Evi Anthony PA-C

## 2020-09-02 ENCOUNTER — TRANSFERRED RECORDS (OUTPATIENT)
Dept: HEALTH INFORMATION MANAGEMENT | Facility: CLINIC | Age: 85
End: 2020-09-02

## 2020-09-03 ENCOUNTER — TELEPHONE (OUTPATIENT)
Dept: OTHER | Facility: CLINIC | Age: 85
End: 2020-09-03

## 2020-09-03 NOTE — TELEPHONE ENCOUNTER
Called patient, left message for him to return my call.    History:   hospitalization for GI bleed 8/15/20 and acute blood loss anemia while on on anticoagulation  Demadex stopped 9/2/20 when BP was 84/46.    Per chart review, patient called nurse triage line at Dr. Bishop's office.  His BP was noted to improve to 96/43 after drinking a large glass of water.    He was advised to stay hydrated, monitor BP, and report to ED for any symptoms of progressive hypotension.  He will be calling Dr. Ye's office to obtain results of Hgb drawn yesterday.      Sammie BROOKS BSN  [facility]  [phone]

## 2020-09-03 NOTE — TELEPHONE ENCOUNTER
[facility]    Who is the name of the provider?:  Bhavesh      What is the location you see this provider at?: [place]    Reason for call:  Was told to call if bp remains low, 78/44.    Can we leave a detailed message on this number?  YES

## 2020-09-09 ENCOUNTER — NURSE TRIAGE (OUTPATIENT)
Dept: FAMILY MEDICINE | Facility: CLINIC | Age: 85
End: 2020-09-09

## 2020-09-09 DIAGNOSIS — K92.2 UPPER GI BLEEDING: ICD-10-CM

## 2020-09-09 DIAGNOSIS — D50.0 IRON DEFICIENCY ANEMIA DUE TO CHRONIC BLOOD LOSS: Primary | ICD-10-CM

## 2020-09-09 NOTE — TELEPHONE ENCOUNTER
"Recheck BP: 98/58  HR 64    Denies lightheaded    He inquires about a \"medicine for low blood pressure\"     Discussed that this should be discussed with Dr. Bishop at his visit-- he would like to ask , now since it has been a week since he has been dealing with these low numbers.     Please advise,    Thank you,   Chrissy ALICEA RN          Triage callback: [phone]  "

## 2020-09-09 NOTE — TELEPHONE ENCOUNTER
Patient notified   He will come tomorrow to check BP and lab   Will continue monitoring in the interim     Yamilex EVERETT RN

## 2020-09-09 NOTE — TELEPHONE ENCOUNTER
"To PCP: Pt has ongoing concerns with hypotension, some dizziness when standing/walking- resolved by sitting     With recent hx of GI bleed- would you recommend to return to ER?     BP and symptoms recovered with supine position with legs elevated.     He originally states that he is not taking BP Meds, but then does note that he takes a half a heart pill (Metoprolol 50mg?) took a dose this morning    Thank you,   Chrissy ALICEA RN      S: Hypotension reading before call 78/37    B: Recurrent concerns with hypotension, recent occult positive stool and low hgb levels     A:    Sit in recliner with legs elevated, drink a large glass of water-    Recheck while on the phone   1110: 104/41  HR 60    Recheck 1114  108/48  HR 68    O2 92%      He notes he took his 1/2 tablet of Metoprolol (50mg) this morning     Denies chest pains   Denies SOB     Discussed that his stools remain BLACK- takes Iron M, W, F- stools do not have a maroon or redness on the TP. He did not notice the bleeding the last time     Denies lightheaded/dizziness with sitting     Some lightheadedness when he walked from his table to the recliner     R: sit in recliner with legs elevated, drink a large glass of water- will route to PCP to review     Call with worsening symptoms           Reason for Disposition    Systolic BP < 80 and NOT dizzy, lightheaded or weak (feels normal)    Additional Information    Systolic BP  while taking blood pressure medications and NOT dizzy, lightheaded or weak    Answer Assessment - Initial Assessment Questions  1. BLOOD PRESSURE: \"What is the blood pressure?\" \"Did you take at least two measurements 5 minutes apart?\"        78/37    2. ONSET: \"When did you take your blood pressure?\"      Just now     3. HOW: \"How did you obtain the blood pressure?\" (e.g., visiting nurse, automatic home BP monitor)    Automatic home BP monitor     4. HISTORY: \"Do you have a history of low blood pressure?\" \"What is your blood pressure " "normally?\"       Yes, has been low     5. MEDICATIONS: \"Are you taking any medications for blood pressure?\" If yes: \"Have they been changed recently?\"    None    6. PULSE RATE: \"Do you know what your pulse rate is?\"       7. OTHER SYMPTOMS: \"Have you been sick recently?\" \"Have you had a recent injury?\"      *No Answer*    Protocols used: LOW BLOOD PRESSURE-A-OH    "

## 2020-09-09 NOTE — TELEPHONE ENCOUNTER
If symptoms are severe and persistent, then he should go to ER  If symptoms are intermittent, then he could have a hemoglobin check today or tomorrow   Lab ordered, help him schedule lab appointment

## 2020-09-09 NOTE — TELEPHONE ENCOUNTER
Reason for call:  Patient reporting a symptom    Symptom or request: low BP 78/37    Duration (how long have symptoms been present): 1 week    Have you been treated for this before? No    Additional comments: pt would like to see Dr roberts or know what to do    Phone Number patient can be reached at:  Home number on file [phone] (home)    Best Time:  any    Can we leave a detailed message on this number:  YES    Call taken on 9/9/2020 at 11:00 AM by Arun So

## 2020-09-09 NOTE — TELEPHONE ENCOUNTER
I don't really have a magic pill low blood pressure   I would recommend getting the blood test to make sure that he is not having ongoing blood loss that could be causing the low blood pressure   If the hemoglobin is stable we could consider a trial of fludrocortisone, but we would need to discuss potential side effects and risks   We do have an appointment on 9/18

## 2020-09-10 ENCOUNTER — ALLIED HEALTH/NURSE VISIT (OUTPATIENT)
Dept: NURSING | Facility: CLINIC | Age: 85
End: 2020-09-10
Payer: COMMERCIAL

## 2020-09-10 VITALS — SYSTOLIC BLOOD PRESSURE: 114 MMHG | DIASTOLIC BLOOD PRESSURE: 51 MMHG

## 2020-09-10 DIAGNOSIS — D50.9 IRON DEFICIENCY ANEMIA, UNSPECIFIED IRON DEFICIENCY ANEMIA TYPE: ICD-10-CM

## 2020-09-10 DIAGNOSIS — Z01.30 BP CHECK: Primary | ICD-10-CM

## 2020-09-10 LAB
HGB BLD-MCNC: 8 G/DL (ref 13.3–17.7)
IRON SATN MFR SERPL: 11 % (ref 15–46)
IRON SERPL-MCNC: 31 UG/DL (ref 35–180)
TIBC SERPL-MCNC: 280 UG/DL (ref 240–430)

## 2020-09-10 PROCEDURE — 36415 COLL VENOUS BLD VENIPUNCTURE: CPT | Performed by: INTERNAL MEDICINE

## 2020-09-10 PROCEDURE — 85018 HEMOGLOBIN: CPT | Performed by: INTERNAL MEDICINE

## 2020-09-10 PROCEDURE — 83550 IRON BINDING TEST: CPT | Performed by: INTERNAL MEDICINE

## 2020-09-10 PROCEDURE — 99207 ZZC NO CHARGE NURSE ONLY: CPT

## 2020-09-10 PROCEDURE — 83540 ASSAY OF IRON: CPT | Performed by: INTERNAL MEDICINE

## 2020-09-10 NOTE — LETTER
September 11, 2020      Hermilo Velasquez  [address] [facility] [address]        Dear ,    The following letter pertains to your most recent diagnostic tests:     The hemoglobin has dropped a bit since last check and now the iron stores are dropping again.  I think we should arrange for another iron infusion.  I will have my staff reach out to you to arrange for this.       Bottom line:  When I see you on 9/18 we can discuss strategies on how to stop the hemoglobin from dropping.         Sincerely,     Dr. Bishop     Resulted Orders   Hemoglobin   Result Value Ref Range    Hemoglobin 8.0 (L) 13.3 - 17.7 g/dL   Iron and iron binding capacity   Result Value Ref Range    Iron 31 (L) 35 - 180 ug/dL    Iron Binding Cap 280 240 - 430 ug/dL    Iron Saturation Index 11 (L) 15 - 46 %

## 2020-09-11 ENCOUNTER — TELEPHONE (OUTPATIENT)
Dept: FAMILY MEDICINE | Facility: CLINIC | Age: 85
End: 2020-09-11

## 2020-09-11 DIAGNOSIS — I10 BENIGN ESSENTIAL HYPERTENSION: ICD-10-CM

## 2020-09-11 PROBLEM — D50.9 IRON DEFICIENCY ANEMIA, UNSPECIFIED IRON DEFICIENCY ANEMIA TYPE: Status: ACTIVE | Noted: 2020-09-11

## 2020-09-11 NOTE — RESULT ENCOUNTER NOTE
Difficult scenario:  High risk for recurrent VTE and stroke in setting of atrial fibrillation.  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  I think returning to [facility] to discuss treatment of the AVM's seems like the most logical strategy initially.  If they cannot address the AVM's then we would need to think about the filter and Watchman

## 2020-09-11 NOTE — TELEPHONE ENCOUNTER
Fax from [facility] requesting    Metoprolol Succinate 25mg daily    Chart has 12.5mg daily    See 9/9/20 triage encounter - patient states he's using 50mg daily maybe? Unsure.      Pharmacy needing refill.    Patient was in on 9/10 for labs - he called today 9/11 seeking results.    Next OV 9-18-20 [place]    Need to figure out what dose patient is really taking; should be taking.    Tran Cortes, RT (R)

## 2020-09-11 NOTE — TELEPHONE ENCOUNTER
"To PCP:: Injectafer is pended under \"Therapy Plan 2\"     Please review, authorize and route back to Triage to notify [facility]    Thank you,   Chrissy ALICEA RN    "

## 2020-09-11 NOTE — RESULT ENCOUNTER NOTE
Can we set Chapito up for another iron infusion at [facility], if you pend the orders I will sign them

## 2020-09-11 NOTE — TELEPHONE ENCOUNTER
"4/2/2020 Hosp Discharge notes:  Continue bid metoprolol at reduced dose of 12.5 mg BID     9-3-2020: patient reported to triage nurse, \"not taking Metoprolol lately\"  Dr Weiner's advise was OK for patient to wait for the scheduled appointment with Dr Weinre.      9-9-2020:  \"He originally states that he is not taking BP Meds, but then does note that he takes a half a heart pill (Metoprolol 50mg?) took a dose this morning\"    Called patient to ask him what dose of Metoprolol he is currently taking.   Left message on patient's voice mail to return call to triage to review patient's current dose of Metoprolol.     Evie VERA RN,BSN          "

## 2020-09-11 NOTE — TELEPHONE ENCOUNTER
Reason for Call:  Request for results:    Name of test or procedure: Labs hemoglobin    Date of test of procedure: 9/10/2020    Location of the test or procedure: [facility]    OK to leave the result message on voice mail or with a family member? YES    Phone number Patient can be reached at:  Home number on file [phone] (home)    Additional comments:     Call taken on 9/11/2020 at 11:41 AM by Amira Raphael

## 2020-09-11 NOTE — RESULT ENCOUNTER NOTE
The following letter pertains to your most recent diagnostic tests:    The hemoglobin has dropped a bit since last check and now the iron stores are dropping again.  I think we should arrange for another iron infusion.  I will have my staff reach out to you to arrange for this.      Bottom line:  When I see you on 9/18 we can discuss strategies on how to stop the hemoglobin from dropping.        Sincerely,    Dr. Bishop

## 2020-09-13 ENCOUNTER — NURSE TRIAGE (OUTPATIENT)
Dept: NURSING | Facility: CLINIC | Age: 85
End: 2020-09-13

## 2020-09-13 ENCOUNTER — APPOINTMENT (OUTPATIENT)
Dept: CT IMAGING | Facility: CLINIC | Age: 85
DRG: 193 | End: 2020-09-13
Attending: EMERGENCY MEDICINE
Payer: COMMERCIAL

## 2020-09-13 ENCOUNTER — HOSPITAL ENCOUNTER (INPATIENT)
Facility: CLINIC | Age: 85
LOS: 3 days | Discharge: HOME | DRG: 193 | End: 2020-09-16
Attending: EMERGENCY MEDICINE | Admitting: INTERNAL MEDICINE
Payer: COMMERCIAL

## 2020-09-13 DIAGNOSIS — G62.9 NEUROPATHY: Primary | ICD-10-CM

## 2020-09-13 DIAGNOSIS — D50.9 IRON DEFICIENCY ANEMIA, UNSPECIFIED IRON DEFICIENCY ANEMIA TYPE: ICD-10-CM

## 2020-09-13 DIAGNOSIS — J18.9 PNEUMONIA DUE TO INFECTIOUS ORGANISM, UNSPECIFIED LATERALITY, UNSPECIFIED PART OF LUNG: ICD-10-CM

## 2020-09-13 DIAGNOSIS — J44.1 COPD EXACERBATION (H): ICD-10-CM

## 2020-09-13 PROBLEM — J96.92 RESPIRATORY FAILURE WITH HYPOXIA AND HYPERCAPNIA (H): Status: ACTIVE | Noted: 2020-09-13

## 2020-09-13 PROBLEM — J96.91 RESPIRATORY FAILURE WITH HYPOXIA AND HYPERCAPNIA (H): Status: ACTIVE | Noted: 2020-09-13

## 2020-09-13 LAB
ACANTHOCYTES BLD QL SMEAR: SLIGHT
ALBUMIN SERPL-MCNC: 2.6 G/DL (ref 3.4–5)
ALP SERPL-CCNC: 95 U/L (ref 40–150)
ALT SERPL W P-5'-P-CCNC: 9 U/L (ref 0–70)
ANION GAP SERPL CALCULATED.3IONS-SCNC: 6 MMOL/L (ref 3–14)
AST SERPL W P-5'-P-CCNC: 16 U/L (ref 0–45)
BASE DEFICIT BLDV-SCNC: 0.7 MMOL/L
BASOPHILS # BLD AUTO: 0 10E9/L (ref 0–0.2)
BASOPHILS NFR BLD AUTO: 0 %
BILIRUB SERPL-MCNC: 0.7 MG/DL (ref 0.2–1.3)
BUN SERPL-MCNC: 23 MG/DL (ref 7–30)
CALCIUM SERPL-MCNC: 7.8 MG/DL (ref 8.5–10.1)
CHLORIDE SERPL-SCNC: 109 MMOL/L (ref 94–109)
CO2 SERPL-SCNC: 25 MMOL/L (ref 20–32)
CREAT SERPL-MCNC: 1.55 MG/DL (ref 0.66–1.25)
DIFFERENTIAL METHOD BLD: ABNORMAL
EOSINOPHIL # BLD AUTO: 0.3 10E9/L (ref 0–0.7)
EOSINOPHIL NFR BLD AUTO: 1 %
ERYTHROCYTE [DISTWIDTH] IN BLOOD BY AUTOMATED COUNT: 18 % (ref 10–15)
GFR SERPL CREATININE-BSD FRML MDRD: 39 ML/MIN/{1.73_M2}
GLUCOSE SERPL-MCNC: 119 MG/DL (ref 70–99)
HCO3 BLDV-SCNC: 27 MMOL/L (ref 21–28)
HCT VFR BLD AUTO: 27.9 % (ref 40–53)
HGB BLD-MCNC: 7.9 G/DL (ref 13.3–17.7)
HGB BLD-MCNC: 8.1 G/DL (ref 13.3–17.7)
HGB BLD-MCNC: 8.4 G/DL (ref 13.3–17.7)
LABORATORY COMMENT REPORT: NORMAL
LACTATE BLD-SCNC: 1.7 MMOL/L (ref 0.7–2)
LACTATE BLD-SCNC: 1.9 MMOL/L (ref 0.7–2)
LYMPHOCYTES # BLD AUTO: 1 10E9/L (ref 0.8–5.3)
LYMPHOCYTES NFR BLD AUTO: 3 %
MCH RBC QN AUTO: 27.5 PG (ref 26.5–33)
MCHC RBC AUTO-ENTMCNC: 30.1 G/DL (ref 31.5–36.5)
MCV RBC AUTO: 91 FL (ref 78–100)
MONOCYTES # BLD AUTO: 3.6 10E9/L (ref 0–1.3)
MONOCYTES NFR BLD AUTO: 11 %
NEUTROPHILS # BLD AUTO: 27.5 10E9/L (ref 1.6–8.3)
NEUTROPHILS NFR BLD AUTO: 85 %
O2/TOTAL GAS SETTING VFR VENT: 8 %
OXYHGB MFR BLDV: 51 %
PCO2 BLDV: 59 MM HG (ref 40–50)
PH BLDV: 7.27 PH (ref 7.32–7.43)
PLATELET # BLD AUTO: 223 10E9/L (ref 150–450)
PLATELET # BLD EST: ABNORMAL 10*3/UL
PO2 BLDV: 32 MM HG (ref 25–47)
POTASSIUM SERPL-SCNC: 4.2 MMOL/L (ref 3.4–5.3)
PROT SERPL-MCNC: 6 G/DL (ref 6.8–8.8)
RBC # BLD AUTO: 3.06 10E12/L (ref 4.4–5.9)
SARS-COV-2 RNA SPEC QL NAA+PROBE: NEGATIVE
SARS-COV-2 RNA SPEC QL NAA+PROBE: NORMAL
SODIUM SERPL-SCNC: 140 MMOL/L (ref 133–144)
SPECIMEN SOURCE: NORMAL
SPECIMEN SOURCE: NORMAL
WBC # BLD AUTO: 32.3 10E9/L (ref 4–11)

## 2020-09-13 PROCEDURE — 85025 COMPLETE CBC W/AUTO DIFF WBC: CPT | Performed by: EMERGENCY MEDICINE

## 2020-09-13 PROCEDURE — 25000128 H RX IP 250 OP 636: Performed by: INTERNAL MEDICINE

## 2020-09-13 PROCEDURE — 25800030 ZZH RX IP 258 OP 636: Performed by: EMERGENCY MEDICINE

## 2020-09-13 PROCEDURE — 82805 BLOOD GASES W/O2 SATURATION: CPT | Performed by: EMERGENCY MEDICINE

## 2020-09-13 PROCEDURE — C9803 HOPD COVID-19 SPEC COLLECT: HCPCS

## 2020-09-13 PROCEDURE — 71275 CT ANGIOGRAPHY CHEST: CPT

## 2020-09-13 PROCEDURE — 25000125 ZZHC RX 250: Performed by: EMERGENCY MEDICINE

## 2020-09-13 PROCEDURE — 96361 HYDRATE IV INFUSION ADD-ON: CPT

## 2020-09-13 PROCEDURE — 25000128 H RX IP 250 OP 636: Performed by: EMERGENCY MEDICINE

## 2020-09-13 PROCEDURE — 25800030 ZZH RX IP 258 OP 636: Performed by: INTERNAL MEDICINE

## 2020-09-13 PROCEDURE — 85018 HEMOGLOBIN: CPT | Performed by: INTERNAL MEDICINE

## 2020-09-13 PROCEDURE — C9113 INJ PANTOPRAZOLE SODIUM, VIA: HCPCS | Performed by: INTERNAL MEDICINE

## 2020-09-13 PROCEDURE — 99223 1ST HOSP IP/OBS HIGH 75: CPT | Mod: AI | Performed by: INTERNAL MEDICINE

## 2020-09-13 PROCEDURE — 36415 COLL VENOUS BLD VENIPUNCTURE: CPT | Performed by: INTERNAL MEDICINE

## 2020-09-13 PROCEDURE — 12000011 ZZH R&B MS OVERFLOW

## 2020-09-13 PROCEDURE — 99291 CRITICAL CARE FIRST HOUR: CPT | Mod: 25

## 2020-09-13 PROCEDURE — 96375 TX/PRO/DX INJ NEW DRUG ADDON: CPT

## 2020-09-13 PROCEDURE — 96365 THER/PROPH/DIAG IV INF INIT: CPT | Mod: 59

## 2020-09-13 PROCEDURE — 80053 COMPREHEN METABOLIC PANEL: CPT | Performed by: EMERGENCY MEDICINE

## 2020-09-13 PROCEDURE — 87040 BLOOD CULTURE FOR BACTERIA: CPT | Performed by: EMERGENCY MEDICINE

## 2020-09-13 PROCEDURE — U0003 INFECTIOUS AGENT DETECTION BY NUCLEIC ACID (DNA OR RNA); SEVERE ACUTE RESPIRATORY SYNDROME CORONAVIRUS 2 (SARS-COV-2) (CORONAVIRUS DISEASE [COVID-19]), AMPLIFIED PROBE TECHNIQUE, MAKING USE OF HIGH THROUGHPUT TECHNOLOGIES AS DESCRIBED BY CMS-2020-01-R: HCPCS | Performed by: EMERGENCY MEDICINE

## 2020-09-13 PROCEDURE — 83605 ASSAY OF LACTIC ACID: CPT | Performed by: EMERGENCY MEDICINE

## 2020-09-13 PROCEDURE — 83605 ASSAY OF LACTIC ACID: CPT | Performed by: INTERNAL MEDICINE

## 2020-09-13 PROCEDURE — 25000125 ZZHC RX 250: Performed by: INTERNAL MEDICINE

## 2020-09-13 PROCEDURE — 25000132 ZZH RX MED GY IP 250 OP 250 PS 637: Performed by: INTERNAL MEDICINE

## 2020-09-13 PROCEDURE — 94640 AIRWAY INHALATION TREATMENT: CPT

## 2020-09-13 RX ORDER — ALBUTEROL SULFATE 90 UG/1
1-2 AEROSOL, METERED RESPIRATORY (INHALATION)
Status: DISCONTINUED | OUTPATIENT
Start: 2020-09-13 | End: 2020-09-14

## 2020-09-13 RX ORDER — FERROUS SULFATE 325(65) MG
325 TABLET, DELAYED RELEASE (ENTERIC COATED) ORAL
Status: DISCONTINUED | OUTPATIENT
Start: 2020-09-14 | End: 2020-09-13

## 2020-09-13 RX ORDER — NALOXONE HYDROCHLORIDE 0.4 MG/ML
.1-.4 INJECTION, SOLUTION INTRAMUSCULAR; INTRAVENOUS; SUBCUTANEOUS
Status: DISCONTINUED | OUTPATIENT
Start: 2020-09-13 | End: 2020-09-16 | Stop reason: HOSPADM

## 2020-09-13 RX ORDER — METHYLPREDNISOLONE SODIUM SUCCINATE 125 MG/2ML
125 INJECTION, POWDER, LYOPHILIZED, FOR SOLUTION INTRAMUSCULAR; INTRAVENOUS ONCE
Status: COMPLETED | OUTPATIENT
Start: 2020-09-13 | End: 2020-09-13

## 2020-09-13 RX ORDER — FERROUS SULFATE 325(65) MG
325 TABLET ORAL
Status: DISCONTINUED | OUTPATIENT
Start: 2020-09-14 | End: 2020-09-14

## 2020-09-13 RX ORDER — HYDROXYZINE HYDROCHLORIDE 25 MG/1
50 TABLET, FILM COATED ORAL EVERY 6 HOURS PRN
Status: DISCONTINUED | OUTPATIENT
Start: 2020-09-13 | End: 2020-09-16 | Stop reason: HOSPADM

## 2020-09-13 RX ORDER — POTASSIUM CHLORIDE 1.5 G/1.58G
20-40 POWDER, FOR SOLUTION ORAL
Status: DISCONTINUED | OUTPATIENT
Start: 2020-09-13 | End: 2020-09-14

## 2020-09-13 RX ORDER — ONDANSETRON 2 MG/ML
4 INJECTION INTRAMUSCULAR; INTRAVENOUS EVERY 6 HOURS PRN
Status: DISCONTINUED | OUTPATIENT
Start: 2020-09-13 | End: 2020-09-16 | Stop reason: HOSPADM

## 2020-09-13 RX ORDER — METHYLPREDNISOLONE SODIUM SUCCINATE 125 MG/2ML
60 INJECTION, POWDER, LYOPHILIZED, FOR SOLUTION INTRAMUSCULAR; INTRAVENOUS EVERY 12 HOURS
Status: DISCONTINUED | OUTPATIENT
Start: 2020-09-13 | End: 2020-09-14

## 2020-09-13 RX ORDER — AZITHROMYCIN 500 MG/1
500 INJECTION, POWDER, LYOPHILIZED, FOR SOLUTION INTRAVENOUS ONCE
Status: COMPLETED | OUTPATIENT
Start: 2020-09-13 | End: 2020-09-13

## 2020-09-13 RX ORDER — HYDROXYZINE HYDROCHLORIDE 25 MG/1
50 TABLET, FILM COATED ORAL AT BEDTIME
Status: DISCONTINUED | OUTPATIENT
Start: 2020-09-13 | End: 2020-09-16 | Stop reason: HOSPADM

## 2020-09-13 RX ORDER — AMOXICILLIN 250 MG
2 CAPSULE ORAL 2 TIMES DAILY PRN
Status: DISCONTINUED | OUTPATIENT
Start: 2020-09-13 | End: 2020-09-16 | Stop reason: HOSPADM

## 2020-09-13 RX ORDER — GABAPENTIN 300 MG/1
900 CAPSULE ORAL AT BEDTIME
Status: DISCONTINUED | OUTPATIENT
Start: 2020-09-13 | End: 2020-09-16 | Stop reason: HOSPADM

## 2020-09-13 RX ORDER — FAMOTIDINE 20 MG/1
20 TABLET, FILM COATED ORAL 2 TIMES DAILY
Status: DISCONTINUED | OUTPATIENT
Start: 2020-09-13 | End: 2020-09-16 | Stop reason: HOSPADM

## 2020-09-13 RX ORDER — AZITHROMYCIN 500 MG/1
500 INJECTION, POWDER, LYOPHILIZED, FOR SOLUTION INTRAVENOUS EVERY 24 HOURS
Status: DISCONTINUED | OUTPATIENT
Start: 2020-09-14 | End: 2020-09-14

## 2020-09-13 RX ORDER — POTASSIUM CHLORIDE 29.8 MG/ML
20 INJECTION INTRAVENOUS
Status: DISCONTINUED | OUTPATIENT
Start: 2020-09-13 | End: 2020-09-14

## 2020-09-13 RX ORDER — POTASSIUM CHLORIDE 1500 MG/1
20-40 TABLET, EXTENDED RELEASE ORAL
Status: DISCONTINUED | OUTPATIENT
Start: 2020-09-13 | End: 2020-09-14

## 2020-09-13 RX ORDER — NITROGLYCERIN 0.4 MG/1
0.4 TABLET SUBLINGUAL EVERY 5 MIN PRN
Status: DISCONTINUED | OUTPATIENT
Start: 2020-09-13 | End: 2020-09-16 | Stop reason: HOSPADM

## 2020-09-13 RX ORDER — ONDANSETRON 4 MG/1
4 TABLET, ORALLY DISINTEGRATING ORAL EVERY 6 HOURS PRN
Status: DISCONTINUED | OUTPATIENT
Start: 2020-09-13 | End: 2020-09-16 | Stop reason: HOSPADM

## 2020-09-13 RX ORDER — ACETAMINOPHEN 325 MG/1
650 TABLET ORAL EVERY 4 HOURS PRN
Status: DISCONTINUED | OUTPATIENT
Start: 2020-09-13 | End: 2020-09-16 | Stop reason: HOSPADM

## 2020-09-13 RX ORDER — SODIUM CHLORIDE 9 MG/ML
INJECTION, SOLUTION INTRAVENOUS CONTINUOUS
Status: DISCONTINUED | OUTPATIENT
Start: 2020-09-13 | End: 2020-09-14

## 2020-09-13 RX ORDER — IOPAMIDOL 755 MG/ML
66 INJECTION, SOLUTION INTRAVASCULAR ONCE
Status: COMPLETED | OUTPATIENT
Start: 2020-09-13 | End: 2020-09-13

## 2020-09-13 RX ORDER — POTASSIUM CHLORIDE 7.45 MG/ML
10 INJECTION INTRAVENOUS
Status: DISCONTINUED | OUTPATIENT
Start: 2020-09-13 | End: 2020-09-14

## 2020-09-13 RX ORDER — AMOXICILLIN 250 MG
1 CAPSULE ORAL 2 TIMES DAILY PRN
Status: DISCONTINUED | OUTPATIENT
Start: 2020-09-13 | End: 2020-09-16 | Stop reason: HOSPADM

## 2020-09-13 RX ADMIN — FAMOTIDINE 20 MG: 20 TABLET ORAL at 21:12

## 2020-09-13 RX ADMIN — CEFEPIME HYDROCHLORIDE 2 G: 2 INJECTION, POWDER, FOR SOLUTION INTRAVENOUS at 14:12

## 2020-09-13 RX ADMIN — HYDROXYZINE HYDROCHLORIDE 50 MG: 25 TABLET, FILM COATED ORAL at 21:12

## 2020-09-13 RX ADMIN — GABAPENTIN 900 MG: 300 CAPSULE ORAL at 21:12

## 2020-09-13 RX ADMIN — IPRATROPIUM BROMIDE AND ALBUTEROL 6 PUFF: 20; 100 SPRAY, METERED RESPIRATORY (INHALATION) at 13:06

## 2020-09-13 RX ADMIN — METHYLPREDNISOLONE SODIUM SUCCINATE 125 MG: 125 INJECTION, POWDER, FOR SOLUTION INTRAMUSCULAR; INTRAVENOUS at 13:25

## 2020-09-13 RX ADMIN — IOPAMIDOL 66 ML: 755 INJECTION, SOLUTION INTRAVENOUS at 13:21

## 2020-09-13 RX ADMIN — SODIUM CHLORIDE: 9 INJECTION, SOLUTION INTRAVENOUS at 16:37

## 2020-09-13 RX ADMIN — SODIUM CHLORIDE 1000 ML: 9 INJECTION, SOLUTION INTRAVENOUS at 13:05

## 2020-09-13 RX ADMIN — IPRATROPIUM BROMIDE AND ALBUTEROL 1 PUFF: 20; 100 SPRAY, METERED RESPIRATORY (INHALATION) at 16:36

## 2020-09-13 RX ADMIN — SODIUM CHLORIDE 90 ML: 9 INJECTION, SOLUTION INTRAVENOUS at 13:22

## 2020-09-13 RX ADMIN — METHYLPREDNISOLONE SODIUM SUCCINATE 62.5 MG: 125 INJECTION, POWDER, FOR SOLUTION INTRAMUSCULAR; INTRAVENOUS at 16:37

## 2020-09-13 RX ADMIN — PANTOPRAZOLE SODIUM 40 MG: 40 INJECTION, POWDER, FOR SOLUTION INTRAVENOUS at 21:12

## 2020-09-13 RX ADMIN — AZITHROMYCIN MONOHYDRATE 500 MG: 500 INJECTION, POWDER, LYOPHILIZED, FOR SOLUTION INTRAVENOUS at 14:52

## 2020-09-13 ASSESSMENT — ENCOUNTER SYMPTOMS
WEAKNESS: 1
COUGH: 0
ABDOMINAL PAIN: 0
FEVER: 0
WHEEZING: 1
SHORTNESS OF BREATH: 1

## 2020-09-13 ASSESSMENT — ACTIVITIES OF DAILY LIVING (ADL): ADLS_ACUITY_SCORE: 13

## 2020-09-13 NOTE — ED PROVIDER NOTES
History     Chief Complaint:    Generalized Weakness and Hypotension      The history is provided by the patient.      Hermilo Velasquez is a 87 year old male who presents via EMS from home with generalized weakness. Per EMS, his wife found him upstairs on the bed with his feet on the ground, per the wife's report he had dark emesis nad review of records indicates concern for possible GI bleed. Upon transfer to ambulance he had bilateral lower extremity weakness. The patient is 70% on room air. The patient uses a nasal cannula at home as needed but is not enirely dependent. He was noted to have a low BP en route. The patient was given 1 Duoneb on the way. EMS did not start an IV. The patient denies chest pain, fever, cough, or abdominal pain.     Allergies:  Lidocaine  Omeprazole  Pantoprazole  Prevacid [Lansoprazole]  Lasix [Furosemide]  Penicillins    Medications:    Albuterol  Eliquis  Lipitor  Pepcid  Neurontin  Atarax  Toprol  Protonix  Spiriva    Past Medical History:    Nonrheumatic aortic valve stenosis  Atrial fibrillation  DVT  GERD  Heart murmur  Monoclonal gammopathy  SBE prophylaxis  Neuropathy  Hyperlipidemia  RBBB  Severe sepsis with acute organ dysfunction  Ventral hernia  Non Hodgkin's lymphoma   Colonic polyps  Gastrointestinal hemorrhage with melena  Pulmonary embolism, bilateral  Benign neoplasm of colon  Bullous pemphigoid  Primary malignant neoplasm of right lung  Centrilobular emphysema    Past Surgical History:    Appendectomy  Total knee arthroplasty  EGD combined x3  Herniorrhaphy ventral  Lobectomy lung  Ankle ligament repair  Replace aortic valve  Rotator cuff repair  3 spine surgeries  Thoracotomy  Tonsillectomy  Turp    Family History:    Father: CAD, Emphysema    Social History:  The patient was accompanied to the ED via EMS.  Smoking Status: Former Smoker  Smokeless Tobacco: Never Used  Alcohol Use: Positive  Drug Use: Negative  PCP: Karson Bishop    Marital Status:        Review of Systems   Constitutional: Negative for fever.   Respiratory: Positive for shortness of breath and wheezing. Negative for cough.    Cardiovascular: Negative for chest pain.   Gastrointestinal: Negative for abdominal pain.   Neurological: Positive for weakness.     10 systems reviewed and negative except as above and in HPI.    Physical Exam     Patient Vitals for the past 24 hrs:   BP Temp Temp src Pulse Resp SpO2   09/13/20 1345 95/44 -- -- 96 15 100 %   09/13/20 1330 99/43 -- -- 98 17 100 %   09/13/20 1300 91/49 -- -- 98 28 100 %   09/13/20 1243 99/55 99.3  F (37.4  C) Temporal 101 17 100 %       Physical Exam  General: Resting on the gurney, appears very uncomfortable  Head:  The scalp, face, and head appear normal  Mouth/Throat: Mucus membranes are moist. Speech is somewhat thick but no facial asymmetry  CV:  Regular rate    Normal S1 and S2  No pathological murmur   Resp:  Breath sounds wheezy and coarse, but equal bilaterally    Short of breath with expiratory wheeze  GI:  Abdomen is soft, no rigidity    No tenderness to palpation  MS:  Normal motor assessment of all extremities.    Good capillary refill noted.  Skin:   No rash or lesions noted.  Neuro: Speech is somewhat thick but no facial asymmetry     Strength and sensation intact x4.  Psych:  Awake. Alert.  Normal affect.      Appropriate interactions.    Emergency Department Course     Imaging:  Radiology findings were communicated with the patient and Admitting MD who voiced understanding of the findings.    CT Chest w/ IV contrast - PE protocol:   1.  No lobar or segmental pulmonary emboli. Evaluation for small  peripheral pulmonary emboli at the lung bases is limited by breathing  motion. Resolution of previously seen right lower lobe segmental  pulmonary embolus.  2.  New patchy airspace opacity in the right upper and lower lobes  concerning for developing infection. Stable small right pleural  effusion.  3.  Resolution of left lung  pneumonia and resolved trace left pleural  effusion.  4.  New mildly enlarged 1.2 cm right hilar lymph node. This is  nonspecific, likely reactive. As per radiology.     Laboratory:  Laboratory findings were communicated with the patient and Admitting MD who voiced understanding of the findings.    CBC: WBC: 32.3 (H), HGB: 8.4 (L), PLT: 223    CMP: Glucose 119 (H), Creatinine: 1.55 (H), GFR: 39 (L), Calcium: 7.8 (L), Albumin: 2.6 (L), Protein: 6.0 (L), o/w WNL     VBG: pH: 7.27 (L), PCO2: 59 (H), PO2: 32, Bicarbonate: 27, o/w WNL    1256 Lactic Acid: 1.9    Blood Culture x2: Pending    COVID-19 Virus (Coronavirus), PCR NP Swab: Pending     Sputum Culture Aerobic Bacterial: Ordered     Interventions:  1305 NS 1L IV  1306 Combivent Respimat 6 puffs Inhalation  1325 Solu-Medrol 125 mg IV  1412 Maxipime 2 g IV    Emergency Department Course:  Past medical records, nursing notes, and vitals reviewed.    1239 I performed an exam of the patient as documented above.     IV was inserted and blood was drawn for laboratory testing, results above.    The patient was sent for a Chest CT while in the emergency department, results above.     1401 I rechecked the patient and discussed the results of his workup thus far.     1426 I consulted with Dr. Rodney, Hospitalist, regarding the patient's history and presentation here in the emergency department.    Findings and plan explained to the Patient who consents to admission. Discussed the patient with Dr. Rodney, who will admit the patient to a Medical bed for further monitoring, evaluation, and treatment.    I personally reviewed the laboratory and imaging results with the Patient and answered all related questions prior to admission.     Impression & Plan     Covid-19  Hermilo Velasquez was evaluated during a global COVID-19 pandemic, which necessitated consideration that the patient might be at risk for infection with the SARS-CoV-2 virus that causes COVID-19.   Applicable  protocols for evaluation were followed during the patient's care.   COVID-19 was considered as part of the patient's evaluation. The plan for testing is:  a test was obtained during this visit.      Medical Decision Making:  Hermilo Velasquez is a 87 year old male who presents for evaluation of shortness of breath and generalized weakness. History, physical exam and imaging studies are consistent with pneumonia.  The first dose of antibiotics was given in ED. there was concern for possible GI bleed, however, there is been no evidence of this in her evaluation today, his hemoglobin is stable, and the symptoms are most likely respiratory.  He did look clinically dry and his Cr slightly elevated and therefore was given IV fluids.  Breathing improved with MDI, steroids given as well.  Minimal LE ED, CHF unliekly.  Given hx of CA, CT PE obtained which showed pna, which is being treated to cover resistant organisms as well.    Critical Care Time: was 35 minutes for this patient excluding procedures    Diagnosis:    ICD-10-CM    1. Pneumonia due to infectious organism, unspecified laterality, unspecified part of lung  J18.9        Disposition:  Admitted to Dr. Joe    Scribe Disclosure:  I, Richie Espinoza, am serving as a scribe at 12:47 PM on 9/13/2020 to document services personally performed by Meme Navarro MD based on my observations and the provider's statements to me.        Meme Navarro MD  09/13/20 [id number]

## 2020-09-13 NOTE — PROGRESS NOTES
RECEIVING UNIT ED HANDOFF REVIEW    ED Nurse Handoff Report was reviewed by: Joellen Reyes RN on September 13, 2020 at 3:21 PM

## 2020-09-13 NOTE — H&P
[facility]    History and Physical  Hospitalist       Date of Admission:  9/13/2020  Date of Service (when I saw the patient): 09/13/20    Assessment & Plan   Hermilo Velasquez is a 87 year old male who presents with shortness of breath, dark vomitus    Note patient is somewhat of a poor historian.    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  PTA albuterol prn, spiriva  With weakness and decreased LOC at home. EMS contacted and found to have O2 sats 70% on room air. Hypotensive en route, ED ' systolic (previous discharge /54). Sats 100% initially on 8L, able to decrease to 3L. Afebrile.  Diffuse wheezing on exam but moving air. CT chest on admission with new opacities in R upper and lower lobes concerning for infection. WBC 32.3. lactate normal. VBG 7.27/59/32.   - continue cefepime, azithro (PCN allergy)  - blood and sputum cultures  - IV fluids  - scheduled combivent inhaler q4 hours  - prn albuterol inhaler q2 hours  - solumedrol 60 IV q12 for now  - COVID-19 pending  - PT once cleared from COVID    Acute Blood Loss Anemia, most likely due to slow GI bleed  Chronic iron deficiency anemia  H/o GERD, gastric and duodenal angiectasias   PTA famotidine 40 mg BID, FeSO4 3x weekly, pantoprazole 40 mg BID  On chronic anticoagulation as below. Recently hospitalized for weakness and fatigue with hgb 7.4. Has h/o AVM's and gastric/ duodenal angioectasias. Underwent EGD on 8/17 with single nonbleeding angioectasia seen. Also had recent previous EGD and colonoscopy with AVM's noted. Concern on admission for dark emesis, pt states black but in ED didn't see this on clothes. hgb stable on admission 8.4 (8.0 3 days prior). Recent iron studies with iron deficiency 9/10.   - q6 hour hgb  - continue pantoprazole IV, famotidine   - continue oral iron   -  telemetry  - keep NPO for now, likely can eat in am if stable hgb and no evidence of hematemesis  - will need GI involvement if further emesis    History of recurrent PE  H/o PE x 2, last 3/2020. Currently on Eliquis 5 mg BID. No new PE seen on admission   - hold PTA Eliquis tonight, likely restart in am/ ASAP    Acute kidney injury  Baseline creatinine appears to be ~0.9-1.0. Creatinine 1.5 on admission. Suspect prerenal   - IV fluids  - avoid nephrotoxins  - repeat labs in the am    CAD  Paroxysmal atrial fibrillation  H/o AoVR  HLD  PTA Eliquis, metoprolol 12.5 mg daily  - hold Eliquis for now  - continue metoprolol 12.5 mg daily, needs med rec  - resume statin when med rec    History of lung cancer and NHL in remission   He follows with [facility] for this. diagnosed 5/2010 NHL, treated with rituxan. 3/2019 with thoracotomy for lung nodule revealing 2.5 cm adenocarcinoma, staged [id number]. Plans noted per oncology to monitor lung cancer with serial CT's.   - as per oncology    Chronic pruritus  PTA atarax, continue    DVT Prophylaxis: on apixaban  Code Status: Full Code    Disposition: Expected discharge in 3-4 days pending improvement in breathing    Camron Joe MD  [phone] (P)  Text Page     Primary Care Physician   Dr. Karson Bishop    Chief Complaint   Black vomitus, weakness    History is obtained from the patient and medical records.  Note patient is a relatively poor historian    History of Present Illness   Hermilo Velasquez is a 87 year old male who presents with possible black vomitus and weakness.  He apparently was at home today when he threw up twice.  This report that the vomitus was black.  In the emergency department the vomitus did not appear black.  He apparently also had weakness at home.  Not able to get further history on this from patient.  Here he denies any chest pain or shortness of breath.  Denies abdominal pain.  Denies nausea or vomiting.  Denies black or bloody  stools.    Past Medical History    I have reviewed this patient's medical history and updated it with pertinent information if needed.   Past Medical History:   Diagnosis Date     Aortic stenosis     Severe AS, 9/2015 AVR - [facility] TRIFECTA Bovine bioprosthesis 25MM TF-[id number]     Atrial fibrillation (H)     9/2015 Paroxysmal post op Afib - discharged on Warfarin and a beta blocker     Deep vein thrombosis (H)      GERD (gastroesophageal reflux disease)      Heart murmur      Monoclonal gammopathy     plasmacyte prominent causing monoclonal gammopathy     Need for SBE (subacute bacterial endocarditis) prophylaxis      Neuropathy      Other and unspecified hyperlipidemia      Other malignant lymphomas     non hodgkin's lymphoma     RBBB (right bundle branch block)      Severe sepsis with acute organ dysfunction (H) 11/16/2015     Unspecified hereditary and idiopathic peripheral neuropathy        Past Surgical History   I have reviewed this patient's surgical history and updated it with pertinent information if needed.  Past Surgical History:   Procedure Laterality Date     APPENDECTOMY       AS TOTAL KNEE ARTHROPLASTY       BACK SURGERY       ESOPHAGOSCOPY, GASTROSCOPY, DUODENOSCOPY (EGD), COMBINED N/A 11/28/2015    Procedure: COMBINED ESOPHAGOSCOPY, GASTROSCOPY, DUODENOSCOPY (EGD);  Surgeon: Danis Castillo MD;  Location:  GI     ESOPHAGOSCOPY, GASTROSCOPY, DUODENOSCOPY (EGD), COMBINED N/A 7/26/2018    Procedure: COMBINED ESOPHAGOSCOPY, GASTROSCOPY, DUODENOSCOPY (EGD);;  Surgeon: Toby Dong DO;  Location:  GI     ESOPHAGOSCOPY, GASTROSCOPY, DUODENOSCOPY (EGD), COMBINED N/A 8/17/2020    Procedure: ESOPHAGOGASTRODUODENOSCOPY (EGD);  Surgeon: Herrera Henry MD;  Location:  GI     HERNIA REPAIR  2006     HERNIORRHAPHY VENTRAL  4/17/2013    Procedure: HERNIORRHAPHY VENTRAL;  VENTRAL HERNIA REPAIR WITH MESH;  Surgeon: Patel Guzman MD;  Location:  OR     KNEE SURGERY      arthroscopic  right knee surgery      LOBECTOMY LUNG Right 3/26/2019    Procedure: POSSIBLE LOBECTOMY LUNG;  Surgeon: Jose A Vasques MD;  Location: [facility] OR     REPAIR LIGAMENT ANKLE  2/23/2012    Procedure:REPAIR LIGAMENT ANKLE; LEFT TARSAL TUNNEL RELEASE OF KNOT OF ARIEL RELEASE; Surgeon:SAUL PUENTE; Location:[facility] OR     REPLACE VALVE AORTIC N/A 9/3/2015    Procedure: REPLACE VALVE AORTIC;  Surgeon: Antonino Mitchell MD;  Location: [facility] OR     ROTATOR CUFF REPAIR RT/LT      bilateral     SPINE SURGERY      3 spine surgeries     THORACOTOMY, WEDGE RESECTION LUNG, COMBINED Right 3/26/2019    Procedure: RIGHT THORACOTOMY, WEDGE RESECTION, RIGHT LOWER LOBE LUNG NODULE,;  Surgeon: Jose A Vasques MD;  Location: [facility] OR     TONSILLECTOMY       TURP         Prior to Admission Medications   Prior to Admission Medications   Prescriptions Last Dose Informant Patient Reported? Taking?   acetaminophen (TYLENOL) 325 MG tablet  Self Yes No   Sig: Take 2 tablets by mouth every 4 hours as needed for mild pain    albuterol (PROAIR HFA/PROVENTIL HFA/VENTOLIN HFA) 108 (90 Base) MCG/ACT Inhaler  Self No No   Sig: Inhale 1-2 puffs into the lungs every 6 hours as needed for shortness of breath / dyspnea or wheezing   apixaban ANTICOAGULANT (ELIQUIS ANTICOAGULANT) 5 MG tablet  Self No No   Sig: Take 1 tablet (5 mg) by mouth 2 times daily   atorvastatin (LIPITOR) 10 MG tablet  Self No No   Sig: Take 1 tablet (10 mg) by mouth daily   famotidine (PEPCID) 40 MG tablet  Self No No   Sig: Take 1 tablet (40 mg) by mouth 2 times daily   ferrous sulfate (FE TABS) 325 (65 Fe) MG EC tablet  Self No No   Sig: Take 1 tablet (325 mg) by mouth three times a week   gabapentin (NEURONTIN) 300 MG capsule  Self Yes No   Sig: Take 900 mg by mouth At Bedtime   hydrOXYzine (ATARAX) 25 MG tablet  Self No No   Sig: Take 2 tablets (50 mg) by mouth every 6 hours as needed   hydrOXYzine (ATARAX) 25 MG tablet  Self Yes No   Sig: Take 50 mg by mouth  "At Bedtime   metoprolol succinate ER (TOPROL-XL) 25 MG 24 hr tablet  Self No No   Sig: Take 0.5 tablets (12.5 mg) by mouth daily   order for DME  Self No No   Sig: Equipment being ordered: Right neoprene knee brace.  ( Fax to [facility] fax  [phone])   pantoprazole (PROTONIX) 40 MG EC tablet  Self No No   Sig: Take 1 tablet (40 mg) by mouth 2 times daily (before meals)   tiotropium (SPIRIVA HANDIHALER) 18 MCG capsule  Self No No   Sig: Inhale 1 capsule (18 mcg) into the lungs daily   triamcinolone (KENALOG) 0.1 % external cream  Self No No   Sig: Apply topically 2 times daily as needed for irritation      Facility-Administered Medications: None     Allergies   Allergies   Allergen Reactions     Lidocaine Blisters     Allergy to lidocaine ointment     Omeprazole Itching     Pantoprazole Itching     Prevacid [Lansoprazole] Itching     Lasix [Furosemide] Rash     Penicillins Rash     \"broke out from injection\" 60 yrs ago         Social History   I have reviewed this patient's social history and updated it with pertinent information if needed. Hermilo Velasquez  reports that he quit smoking about 22 years ago. He has a 110.00 pack-year smoking history. He has never used smokeless tobacco. He reports current alcohol use. He reports that he does not use drugs.    Family History   I have reviewed this patient's family history and updated it with pertinent information if needed.   Family History   Problem Relation Age of Onset     C.A.D. Father      Emphysema Father      Melanoma No family hx of      Skin Cancer No family hx of        Review of Systems   The 10 point Review of Systems is negative other than noted in the HPI or here.     Physical Exam   Temp: 99.3  F (37.4  C) Temp src: Temporal BP: 101/44 Pulse: 93   Resp: 15 SpO2: 97 % O2 Device: Oxymask Oxygen Delivery: 3 LPM  Vital Signs with Ranges  0 lbs 0 oz    Constitutional: alert, oriented and in no acute distress  Eyes: EOMI, PERRL  HEENT: OP " clear  Respiratory: diffuse wheezing but appears to be moving air well  Cardiovascular: irregular, distant heart sounds. No edema  GI: soft, nontender, nondistended, no HSM  Lymph/Hematologic: no cervical LAD  Genitourinary: deferred  Skin: no rashes or lesions grossly  Musculoskeletal: no deformities or arthritis  Neurologic: CN II-XII, FINN, sensation grossly intact  Psychiatric: mood and affect wnl    Data   Data reviewed today:  I personally reviewed the chest CT image(s) showing pneumonia.  Recent Labs   Lab 09/13/20  1256 09/10/20  1425   WBC 32.3*  --    HGB 8.4* 8.0*   MCV 91  --      --      --    POTASSIUM 4.2  --    CHLORIDE 109  --    CO2 25  --    BUN 23  --    CR 1.55*  --    ANIONGAP 6  --    AMRITA 7.8*  --    *  --    ALBUMIN 2.6*  --    PROTTOTAL 6.0*  --    BILITOTAL 0.7  --    ALKPHOS 95  --    ALT 9  --    AST 16  --        Recent Results (from the past 24 hour(s))   CT Chest Pulmonary Embolism w Contrast    Narrative    CT CHEST PULMONARY EMBOLISM W CONTRAST 9/13/2020 1:30 PM    CLINICAL HISTORY: sob, lung CA  TECHNIQUE: CT angiogram chest during arterial phase injection IV  contrast. 2D and 3D MIP reconstructions were performed by the CT  technologist. Dose reduction techniques were used.     CONTRAST: 66 mL Isovue-370        COMPARISON: 3/22/2020, 3/28/2020    FINDINGS:  ANGIOGRAM CHEST: No central, lobar, segmental pulmonary emboli or  subsegmental pulmonary emboli. Resolution of previously seen right  lower lobe segmental pulmonary emboli. Evaluation of small peripheral  pulmonary emboli at the lung bases is limited by breathing motion.  Aortic valvular prosthesis. CABG. No thoracic aortic dissection.    LUNGS AND PLEURA: Right lower lobe wedge resection. New patchy and  nodular airspace opacities most prominent in the right upper and lower  lobes concerning for infection. Small right pleural effusion, not  significantly changed. There is a trace left pleural  effusion.  Previously seen left lung airspace opacities have resolved. Mild left  basilar atelectasis. Centrilobular emphysema.    MEDIASTINUM/AXILLAE: Interval enlargement of right hilar lymph node  measuring 1.3 cm (series 5, image 60). Stable small mediastinal lymph  nodes. Small hiatal hernia. No pericardial effusion.    UPPER ABDOMEN: Stable small hypodense lesions in the liver, too small  to characterize but possibly cysts.    MUSCULOSKELETAL: Old healed rib fractures. Degenerative changes in the  spine.      Impression    IMPRESSION:  1.  No lobar or segmental pulmonary emboli. Evaluation for small  peripheral pulmonary emboli at the lung bases is limited by breathing  motion. Resolution of previously seen right lower lobe segmental  pulmonary embolus.  2.  New patchy airspace opacity in the right upper and lower lobes  concerning for developing infection. Stable small right pleural  effusion.  3.  Resolution of left lung pneumonia and resolved trace left pleural  effusion.  4.  New mildly enlarged 1.2 cm right hilar lymph node. This is  nonspecific, likely reactive.    KALIE CLAIRE MD

## 2020-09-13 NOTE — ED TRIAGE NOTES
Pt found lying down in bed, unable to get up. Upon EMS arrival, patient c/o generalized weakness and found to have low O2 sats. Per report, patient uses @home oxygen as needed.

## 2020-09-13 NOTE — ED NOTES
"[facility]  ED Nurse Handoff Report    ED Chief complaint: Generalized Weakness and Hypotension      ED Diagnosis:   Final diagnoses:   Pneumonia due to infectious organism, unspecified laterality, unspecified part of lung       Code Status: Full Code    Allergies:   Allergies   Allergen Reactions     Lidocaine Blisters     Allergy to lidocaine ointment     Omeprazole Itching     Pantoprazole Itching     Prevacid [Lansoprazole] Itching     Lasix [Furosemide] Rash     Penicillins Rash     \"broke out from injection\" 60 yrs ago         Patient Story: Pt was found half in bed this afternoon by wife. Pt was unable to get up unassisted. EMS called. Pt found to have low sats on room air with low BP. Hx of COPD, PE and lung CA.  Focused Assessment:  Pt alert, difficult to understand. Denies CP. C/o mild SOB. Low O2 sats on RA. Improved to 98% on 3L oxymask. CT indicates pneumonia. IV abx started. BP stable after 1L NS bolus.    Treatments and/or interventions provided: see mar  Patient's response to treatments and/or interventions:     To be done/followed up on inpatient unit:      Does this patient have any cognitive concerns?: none    Activity level - Baseline/Home:  Independent  Activity Level - Current:   Total Care    Patient's Preferred language: English   Needed?: No    Isolation: Special COVID-19 precautions  Infection: Not Applicable  COVID r/o and special precautions  Bariatric?: No    Vital Signs:   Vitals:    09/13/20 1330 09/13/20 1345 09/13/20 1400 09/13/20 1415   BP: 99/43 95/44 100/49 101/44   Pulse: 98 96 95 93   Resp: 17 15 15 15   Temp:       TempSrc:       SpO2: 100% 100% 100% 97%       Cardiac Rhythm:Cardiac Rhythm: Sinus tachycardia    Was the PSS-3 completed:   Yes  What interventions are required if any?               Family Comments: n/a  OBS brochure/video discussed/provided to patient/family: No              Name of person given brochure if not patient: n/a              " Relationship to patient: n/a    For the majority of the shift this patient's behavior was Green.   Behavioral interventions performed were n/a.    ED NURSE PHONE NUMBER: *[phone]

## 2020-09-13 NOTE — TELEPHONE ENCOUNTER
Wife calls to says patient can't sit up, has vomited some dark material and O2 is at 73%.  FNA advised she needs should call 911.  Caller verbalizes understanding.        Reason for Disposition    Difficult to awaken or acting confused (e.g., disoriented, slurred speech)    Additional Information    Negative: [1] Breathing stopped AND [2] hasn't returned    Negative: Choking on something    Negative: Severe difficulty breathing (e.g., struggling for each breath, speaks in single words)    Negative: Bluish (or gray) lips or face now    Protocols used: BREATHING DIFFICULTY-A-AH

## 2020-09-13 NOTE — ED NOTES
Bed: [facility]  Expected date: 9/13/20  Expected time: 12:29 PM  Means of arrival: Ambulance  Comments:  418  Low bp, low sats

## 2020-09-14 PROBLEM — M75.82 ROTATOR CUFF TENDINITIS, LEFT: Status: RESOLVED | Noted: 2019-12-17 | Resolved: 2020-09-14

## 2020-09-14 PROBLEM — N17.9 AKI (ACUTE KIDNEY INJURY) (H): Status: ACTIVE | Noted: 2020-09-14

## 2020-09-14 PROBLEM — K31.819 GASTRIC AVM: Status: ACTIVE | Noted: 2020-09-14

## 2020-09-14 PROBLEM — Z98.890 S/P ROTATOR CUFF SURGERY: Status: RESOLVED | Noted: 2019-12-17 | Resolved: 2020-09-14

## 2020-09-14 PROBLEM — M25.519 SHOULDER JOINT PAIN: Status: RESOLVED | Noted: 2020-08-15 | Resolved: 2020-09-14

## 2020-09-14 PROBLEM — R53.1 GENERALIZED WEAKNESS: Status: RESOLVED | Noted: 2020-08-15 | Resolved: 2020-09-14

## 2020-09-14 PROBLEM — J44.1 COPD EXACERBATION (H): Status: ACTIVE | Noted: 2018-03-26

## 2020-09-14 LAB
ANION GAP SERPL CALCULATED.3IONS-SCNC: 3 MMOL/L (ref 3–14)
BUN SERPL-MCNC: 32 MG/DL (ref 7–30)
CALCIUM SERPL-MCNC: 7.5 MG/DL (ref 8.5–10.1)
CHLORIDE SERPL-SCNC: 115 MMOL/L (ref 94–109)
CO2 SERPL-SCNC: 25 MMOL/L (ref 20–32)
CREAT SERPL-MCNC: 1.21 MG/DL (ref 0.66–1.25)
ERYTHROCYTE [DISTWIDTH] IN BLOOD BY AUTOMATED COUNT: 18.2 % (ref 10–15)
GFR SERPL CREATININE-BSD FRML MDRD: 53 ML/MIN/{1.73_M2}
GLUCOSE SERPL-MCNC: 177 MG/DL (ref 70–99)
HCT VFR BLD AUTO: 25.7 % (ref 40–53)
HGB BLD-MCNC: 7.5 G/DL (ref 13.3–17.7)
HGB BLD-MCNC: 7.5 G/DL (ref 13.3–17.7)
MCH RBC QN AUTO: 26.5 PG (ref 26.5–33)
MCHC RBC AUTO-ENTMCNC: 29.2 G/DL (ref 31.5–36.5)
MCV RBC AUTO: 91 FL (ref 78–100)
PLATELET # BLD AUTO: 181 10E9/L (ref 150–450)
POTASSIUM SERPL-SCNC: 4.3 MMOL/L (ref 3.4–5.3)
RBC # BLD AUTO: 2.83 10E12/L (ref 4.4–5.9)
SODIUM SERPL-SCNC: 143 MMOL/L (ref 133–144)
WBC # BLD AUTO: 22.5 10E9/L (ref 4–11)

## 2020-09-14 PROCEDURE — 25000132 ZZH RX MED GY IP 250 OP 250 PS 637: Performed by: INTERNAL MEDICINE

## 2020-09-14 PROCEDURE — 25800030 ZZH RX IP 258 OP 636: Performed by: INTERNAL MEDICINE

## 2020-09-14 PROCEDURE — 25000128 H RX IP 250 OP 636: Performed by: INTERNAL MEDICINE

## 2020-09-14 PROCEDURE — 12000000 ZZH R&B MED SURG/OB

## 2020-09-14 PROCEDURE — 85027 COMPLETE CBC AUTOMATED: CPT | Performed by: INTERNAL MEDICINE

## 2020-09-14 PROCEDURE — C9113 INJ PANTOPRAZOLE SODIUM, VIA: HCPCS | Performed by: INTERNAL MEDICINE

## 2020-09-14 PROCEDURE — 85018 HEMOGLOBIN: CPT | Performed by: INTERNAL MEDICINE

## 2020-09-14 PROCEDURE — 80048 BASIC METABOLIC PNL TOTAL CA: CPT | Performed by: INTERNAL MEDICINE

## 2020-09-14 PROCEDURE — 36415 COLL VENOUS BLD VENIPUNCTURE: CPT | Performed by: INTERNAL MEDICINE

## 2020-09-14 PROCEDURE — 99233 SBSQ HOSP IP/OBS HIGH 50: CPT | Performed by: INTERNAL MEDICINE

## 2020-09-14 RX ORDER — PREDNISONE 20 MG/1
40 TABLET ORAL DAILY
Status: DISCONTINUED | OUTPATIENT
Start: 2020-09-15 | End: 2020-09-16 | Stop reason: HOSPADM

## 2020-09-14 RX ORDER — HYDROCODONE BITARTRATE AND ACETAMINOPHEN 5; 325 MG/1; MG/1
1 TABLET ORAL EVERY 6 HOURS PRN
Status: ON HOLD | COMMUNITY
End: 2020-09-15

## 2020-09-14 RX ORDER — METHYLPREDNISOLONE SODIUM SUCCINATE 40 MG/ML
40 INJECTION, POWDER, LYOPHILIZED, FOR SOLUTION INTRAMUSCULAR; INTRAVENOUS EVERY 12 HOURS
Status: DISCONTINUED | OUTPATIENT
Start: 2020-09-14 | End: 2020-09-14

## 2020-09-14 RX ORDER — TORSEMIDE 10 MG/1
10 TABLET ORAL DAILY
COMMUNITY
End: 2020-11-23

## 2020-09-14 RX ORDER — CARBOXYMETHYLCELLULOSE SODIUM 5 MG/ML
2 SOLUTION/ DROPS OPHTHALMIC
Status: DISCONTINUED | OUTPATIENT
Start: 2020-09-14 | End: 2020-09-16 | Stop reason: HOSPADM

## 2020-09-14 RX ORDER — AZITHROMYCIN 250 MG/1
500 TABLET, FILM COATED ORAL ONCE
Status: COMPLETED | OUTPATIENT
Start: 2020-09-15 | End: 2020-09-15

## 2020-09-14 RX ORDER — FERROUS SULFATE 325(65) MG
325 TABLET ORAL 2 TIMES DAILY
Status: DISCONTINUED | OUTPATIENT
Start: 2020-09-14 | End: 2020-09-16 | Stop reason: HOSPADM

## 2020-09-14 RX ORDER — CEFEPIME HYDROCHLORIDE 1 G/1
1 INJECTION, POWDER, FOR SOLUTION INTRAMUSCULAR; INTRAVENOUS EVERY 12 HOURS
Status: DISCONTINUED | OUTPATIENT
Start: 2020-09-15 | End: 2020-09-15

## 2020-09-14 RX ORDER — PREDNISONE 20 MG/1
60 TABLET ORAL DAILY
Status: DISCONTINUED | OUTPATIENT
Start: 2020-09-15 | End: 2020-09-14

## 2020-09-14 RX ORDER — ALBUTEROL SULFATE 90 UG/1
2 AEROSOL, METERED RESPIRATORY (INHALATION)
Status: DISCONTINUED | OUTPATIENT
Start: 2020-09-14 | End: 2020-09-16 | Stop reason: HOSPADM

## 2020-09-14 RX ORDER — HYDROXYZINE PAMOATE 50 MG/1
50 CAPSULE ORAL EVERY 6 HOURS PRN
Status: ON HOLD | COMMUNITY
End: 2022-04-11

## 2020-09-14 RX ADMIN — FERROUS SULFATE TAB 325 MG (65 MG ELEMENTAL FE) 325 MG: 325 (65 FE) TAB at 06:57

## 2020-09-14 RX ADMIN — PANTOPRAZOLE SODIUM 40 MG: 40 INJECTION, POWDER, FOR SOLUTION INTRAVENOUS at 09:05

## 2020-09-14 RX ADMIN — METHYLPREDNISOLONE SODIUM SUCCINATE 62.5 MG: 125 INJECTION, POWDER, FOR SOLUTION INTRAMUSCULAR; INTRAVENOUS at 03:34

## 2020-09-14 RX ADMIN — HYDROXYZINE HYDROCHLORIDE 50 MG: 25 TABLET, FILM COATED ORAL at 22:57

## 2020-09-14 RX ADMIN — IRON SUCROSE 300 MG: 20 INJECTION, SOLUTION INTRAVENOUS at 14:38

## 2020-09-14 RX ADMIN — CEFEPIME HYDROCHLORIDE 2 G: 2 INJECTION, POWDER, FOR SOLUTION INTRAVENOUS at 00:19

## 2020-09-14 RX ADMIN — SODIUM CHLORIDE: 9 INJECTION, SOLUTION INTRAVENOUS at 12:33

## 2020-09-14 RX ADMIN — FAMOTIDINE 20 MG: 20 TABLET ORAL at 20:30

## 2020-09-14 RX ADMIN — GABAPENTIN 900 MG: 300 CAPSULE ORAL at 22:56

## 2020-09-14 RX ADMIN — IPRATROPIUM BROMIDE AND ALBUTEROL 1 PUFF: 20; 100 SPRAY, METERED RESPIRATORY (INHALATION) at 12:41

## 2020-09-14 RX ADMIN — METOPROLOL SUCCINATE 12.5 MG: 25 TABLET, EXTENDED RELEASE ORAL at 09:06

## 2020-09-14 RX ADMIN — FERROUS SULFATE TAB 325 MG (65 MG ELEMENTAL FE) 325 MG: 325 (65 FE) TAB at 20:30

## 2020-09-14 RX ADMIN — SODIUM CHLORIDE: 9 INJECTION, SOLUTION INTRAVENOUS at 03:22

## 2020-09-14 RX ADMIN — IPRATROPIUM BROMIDE AND ALBUTEROL 1 PUFF: 20; 100 SPRAY, METERED RESPIRATORY (INHALATION) at 09:08

## 2020-09-14 RX ADMIN — IPRATROPIUM BROMIDE AND ALBUTEROL 1 PUFF: 20; 100 SPRAY, METERED RESPIRATORY (INHALATION) at 03:27

## 2020-09-14 RX ADMIN — CEFEPIME HYDROCHLORIDE 2 G: 2 INJECTION, POWDER, FOR SOLUTION INTRAVENOUS at 12:30

## 2020-09-14 ASSESSMENT — ACTIVITIES OF DAILY LIVING (ADL)
ADLS_ACUITY_SCORE: 13

## 2020-09-14 NOTE — TELEPHONE ENCOUNTER
I think matias is in the hospital right now, so this may need to be back burnered until he is discharged pending their findings

## 2020-09-14 NOTE — PLAN OF CARE
IMC status. A/Ox4, sleepy. VSS, ex.3L NC, SBP 90s-100s. Tele SR with 1st degree AVB. CMS intact. LS dim with intermittent expiratory wheezes, LAROSE. Pt not OOB this shift but repositions independently. IVF , IV Abx given x1, Solu-Medrol given x1. Denies pain. NPO ex. Meds and ice chips. Denies nausea, no emesis. Hgb 7.9, q6 next check pending. Lactic fire, result 1.7. Voiding in urinal, adequate UOP. Plan for transfer to  today. Discharge pending 3-4 days. Continue to monitor.

## 2020-09-14 NOTE — TELEPHONE ENCOUNTER
FYI PCP: Pt has been admitted to [facility]     Thank you,   Chrissy ALICEA RN

## 2020-09-14 NOTE — TELEPHONE ENCOUNTER
To PCP:     The infusion center staff has reached out to Triage. The new Injectafer orders are not signed.     Under Therapy Plan 2 (within Admit/Therapy Orders tab)- there is an unsigned order.      Please review and authorize if appropriate,     Thank you,   Chrissy CURIEL RN

## 2020-09-14 NOTE — PROGRESS NOTES
MD Notification    Notified Person: MD    Notified Person Name: Heath    Notification Date/Time: 09/13/2020 at 1929    Notification Interaction: Web page    Purpose of Notification: COVID NEGATIVE. Can we discontinue isolation? If needed can we transfer patient?     Orders Received: Isolation discontinued and IMC orders ordered.    Comments:

## 2020-09-14 NOTE — PROVIDER NOTIFICATION
MD Notification    Notified Person: MD    Notified Person Name: Heath    Notification Date/Time: 09/13/2020 at 1956    Notification Interaction: Web page    Purpose of Notification: Reasoning behind IMC status?    Orders Received:    Comments:

## 2020-09-14 NOTE — PROGRESS NOTES
[facility]    Hospitalist Progress Note    Assessment & Plan   87 year old male who presents with shortness of breath, dark vomitus     Impression:   Principal Problem:    Respiratory failure with hypoxia and hypercapnia       COPD exacerbation       Possible CAP in RUL and RLL    Leukocytosis    -- on Cefepime and Azithromax, and WBC improving       Gastric AVM's -- on EGD 8/17/20     Possible UGI bleeding     Anemia, iron deficiency   -- Suspect ongoing blood loss related to AVM's and Eliquis, will hold Eliquis for 1 week, replace iron, and check hgb in clinic.       FRED (acute kidney injury) -- improved, suspect dehydration     Active Problems:    Paroxysmal atrial fib -- on Eliquis       Hx of Bilateral PE 2016 -- S/P IVC filter      Essential hypertension with goal blood pressure less than 140/90       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        Plan:  Discussed with patient, wean O2 as able, anticipate home in 1-2 days depending on SOB.      DVT Prophylaxis: Eliquis  Code Status: Full Code    Disposition: Expected discharge to home in 1-2 days     Giuliano Sharif MD  Pager [phone]  Cell Phone [phone]  Text Page (7am to 6pm)  (35 min total)    Interval History   Feels better today, has dry cough.  Does feel jittery -- ?related to nebs vs Prednisone.      Physical Exam   Temp: 97.4  F (36.3  C) Temp src: Oral BP: 97/50 Pulse: 77   Resp: 15 SpO2: 94 % O2 Device: Nasal cannula Oxygen Delivery: 3 LPM  There were no vitals filed for this visit.  Vital Signs with Ranges  Temp:  [96.1  F (35.6  C)-97.4  F (36.3  C)] 97.4  F (36.3  C)  Pulse:  [] 77  Resp:  [13-23] 15  BP: ()/(42-57) 97/50  SpO2:  [92 %-100 %] 94 %  I/O last 3 completed shifts:  In: 1758.33 [P.O.:320; I.V.:1438.33]  Out: 700 [Urine:700]    # Pain Assessment:  Current Pain Score 9/14/2020   Patient currently in pain? denies   Pain score (0-10) -   Pain location -   Pain descriptors -    Hermilo s pain level was assessed and he currently denies pain.        Constitutional: Awake, alert, cooperative, no apparent distress  Respiratory: Clear to auscultation bilaterally, mild bilateral expiratory wheezing   Cardiovascular: Regular rate and rhythm, normal S1 and S2, and no murmur noted  GI: Normal bowel sounds, soft, non-distended, non-tender  Extrem: No calf tenderness, trace ankle edema  Neuro: Ox3, no focal motor or sensory deficits    Medications       [START ON 9/15/2020] azithromycin  500 mg Oral Once     [START ON 9/15/2020] ceFEPIme (MAXIPIME) IV  1 g Intravenous Q12H     famotidine  20 mg Oral BID     ferrous sulfate  325 mg Oral BID     gabapentin  900 mg Oral At Bedtime     hydrOXYzine  50 mg Oral At Bedtime     ipratropium-albuterol  1 puff Inhalation Q4H     iron sucrose (VENOFER) intermittent infusion  300 mg Intravenous Daily     metoprolol succinate ER  12.5 mg Oral Daily     [START ON 9/15/2020] predniSONE  40 mg Oral Daily     sodium chloride (PF)  3 mL Intracatheter Q8H     sodium chloride (PF)  3 mL Intracatheter Q8H       Data   Recent Labs   Lab 09/14/20  1153 09/14/20  0606 09/13/20  2249  09/13/20  1256   WBC  --  22.5*  --   --  32.3*   HGB 7.5* 7.5* 7.9*   < > 8.4*   MCV  --  91  --   --  91   PLT  --  181  --   --  223   NA  --  143  --   --  140   POTASSIUM  --  4.3  --   --  4.2   CHLORIDE  --  115*  --   --  109   CO2  --  25  --   --  25   BUN  --  32*  --   --  23   CR  --  1.21  --   --  1.55*   ANIONGAP  --  3  --   --  6   AMRITA  --  7.5*  --   --  7.8*   GLC  --  177*  --   --  119*   ALBUMIN  --   --   --   --  2.6*   PROTTOTAL  --   --   --   --  6.0*   BILITOTAL  --   --   --   --  0.7   ALKPHOS  --   --   --   --  95   ALT  --   --   --   --  9   AST  --   --   --   --  16    < > = values in this interval not displayed.       Imaging:   Recent Results (from the past 24 hour(s))   CT Chest Pulmonary Embolism w Contrast    Narrative    CT CHEST PULMONARY EMBOLISM W  CONTRAST 9/13/2020 1:30 PM    CLINICAL HISTORY: sob, lung CA  TECHNIQUE: CT angiogram chest during arterial phase injection IV  contrast. 2D and 3D MIP reconstructions were performed by the CT  technologist. Dose reduction techniques were used.     CONTRAST: 66 mL Isovue-370        COMPARISON: 3/22/2020, 3/28/2020    FINDINGS:  ANGIOGRAM CHEST: No central, lobar, segmental pulmonary emboli or  subsegmental pulmonary emboli. Resolution of previously seen right  lower lobe segmental pulmonary emboli. Evaluation of small peripheral  pulmonary emboli at the lung bases is limited by breathing motion.  Aortic valvular prosthesis. CABG. No thoracic aortic dissection.    LUNGS AND PLEURA: Right lower lobe wedge resection. New patchy and  nodular airspace opacities most prominent in the right upper and lower  lobes concerning for infection. Small right pleural effusion, not  significantly changed. There is a trace left pleural effusion.  Previously seen left lung airspace opacities have resolved. Mild left  basilar atelectasis. Centrilobular emphysema.    MEDIASTINUM/AXILLAE: Interval enlargement of right hilar lymph node  measuring 1.3 cm (series 5, image 60). Stable small mediastinal lymph  nodes. Small hiatal hernia. No pericardial effusion.    UPPER ABDOMEN: Stable small hypodense lesions in the liver, too small  to characterize but possibly cysts.    MUSCULOSKELETAL: Old healed rib fractures. Degenerative changes in the  spine.      Impression    IMPRESSION:  1.  No lobar or segmental pulmonary emboli. Evaluation for small  peripheral pulmonary emboli at the lung bases is limited by breathing  motion. Resolution of previously seen right lower lobe segmental  pulmonary embolus.  2.  New patchy airspace opacity in the right upper and lower lobes  concerning for developing infection. Stable small right pleural  effusion.  3.  Resolution of left lung pneumonia and resolved trace left pleural  effusion.  4.  New mildly  enlarged 1.2 cm right hilar lymph node. This is  nonspecific, likely reactive.    KALIE CLAIRE MD

## 2020-09-14 NOTE — PHARMACY-ADMISSION MEDICATION HISTORY
"Pharmacy Medication History  Admission medication history interview status for the 9/13/2020  admission is complete. See EPIC admission navigator for prior to admission medications     Medication history sources: Patient's family/friend (Wife, Roberta), Surescripts, Pharmacy ([facility] [address] [place]) and Care Everywhere  Adherence assessment: Unable to Assess    Significant changes made to the medication list:  - Removed hydroxyzine (duplicate)  - Added torsemide, though do not think patient is taking (see below)  - Added Norco - last filled 10/2019, wife says he still takes as needed.    Additional medication history information:   - Gabapentin written for 300 mg every morning and 900 mg HS. Patient's wife does not know how he takes this; she says there's a \"pm\" on the bottle, so he may just take at night.  - Metoprolol succinate 100 mg (take 50 mg daily) written 6/2020 for 90 day supply, but patient's wife only reported that he's taking the 25 mg tablets, 12.5 mg daily (this 25 mg prescription is from the [facility]).  - Torsemide 10 mg daily ordered to be restarted 8/14/2020 per clinic note (20 mg dose was initially stopped due to hypotension). Prescription was last filled 9/7/2020, but wife reports he is not taking this.    Medication reconciliation completed by provider prior to medication history? Yes    Time spent in this activity: 45 minutes      Prior to Admission medications    Medication Sig Last Dose Taking? Auth Provider   albuterol (PROAIR HFA/PROVENTIL HFA/VENTOLIN HFA) 108 (90 Base) MCG/ACT Inhaler Inhale 1-2 puffs into the lungs every 6 hours as needed for shortness of breath / dyspnea or wheezing prn Yes Karson Bishop MD   apixaban ANTICOAGULANT (ELIQUIS ANTICOAGULANT) 5 MG tablet Take 1 tablet (5 mg) by mouth 2 times daily  Yes Jamie Ospina MD   atorvastatin (LIPITOR) 10 MG tablet Take 1 tablet (10 mg) by mouth daily  Yes Tab Grijalva MD   famotidine (PEPCID) 40 MG " tablet Take 1 tablet (40 mg) by mouth 2 times daily  Yes Willy Harvey MD   ferrous sulfate (FE TABS) 325 (65 Fe) MG EC tablet Take 1 tablet (325 mg) by mouth three times a week  Yes Catalina Leahy PA-C   gabapentin (NEURONTIN) 300 MG capsule Take by mouth See Admin Instructions Rx written for 300 mg in the morning and 900 mg at bedtime, but unclear how patient is taking at home (possibly just taking 1 capsule in the evening)  Yes Unknown, Entered By History   HYDROcodone-acetaminophen (NORCO) 5-325 MG tablet Take 1 tablet by mouth every 6 hours as needed for severe pain Last filled 10/7/2019 #18 for 5 Day supply prn Yes Unknown, Entered By History   hydrOXYzine (VISTARIL) 50 MG capsule Take 50 mg by mouth every 6 hours as needed for itching prn Yes Unknown, Entered By History   metoprolol succinate ER (TOPROL-XL) 25 MG 24 hr tablet Take 0.5 tablets (12.5 mg) by mouth daily  Yes Charlie Muñiz,    pantoprazole (PROTONIX) 40 MG EC tablet Take 1 tablet (40 mg) by mouth 2 times daily (before meals)  Yes Charlie Muñiz,    tiotropium (SPIRIVA HANDIHALER) 18 MCG capsule Inhale 1 capsule (18 mcg) into the lungs daily  Yes Karson Bishop MD   triamcinolone (KENALOG) 0.1 % external cream Apply topically 2 times daily as needed for irritation prn Yes Karson Bishop MD   order for DME Equipment being ordered: Right neoprene knee brace.  ( Fax to [facility] fax  [phone])   Karson Bishop MD   torsemide (DEMADEX) 10 MG tablet Take 10 mg by mouth daily Ordered 9/7/2020 for 30 day supply. Stopped by patient. Also had a prescription for torsemide 20 mg daily picked up 8/11/2020 for 30 day supply. STOPPED BY PATIENT  Unknown, Entered By History

## 2020-09-15 LAB
ANION GAP SERPL CALCULATED.3IONS-SCNC: 2 MMOL/L (ref 3–14)
BUN SERPL-MCNC: 35 MG/DL (ref 7–30)
CALCIUM SERPL-MCNC: 7.9 MG/DL (ref 8.5–10.1)
CHLORIDE SERPL-SCNC: 117 MMOL/L (ref 94–109)
CO2 SERPL-SCNC: 25 MMOL/L (ref 20–32)
CREAT SERPL-MCNC: 1.16 MG/DL (ref 0.66–1.25)
ERYTHROCYTE [DISTWIDTH] IN BLOOD BY AUTOMATED COUNT: 18.1 % (ref 10–15)
GFR SERPL CREATININE-BSD FRML MDRD: 56 ML/MIN/{1.73_M2}
GLUCOSE BLDC GLUCOMTR-MCNC: 163 MG/DL (ref 70–99)
GLUCOSE SERPL-MCNC: 148 MG/DL (ref 70–99)
HCT VFR BLD AUTO: 25.2 % (ref 40–53)
HGB BLD-MCNC: 7.4 G/DL (ref 13.3–17.7)
MCH RBC QN AUTO: 26.9 PG (ref 26.5–33)
MCHC RBC AUTO-ENTMCNC: 29.4 G/DL (ref 31.5–36.5)
MCV RBC AUTO: 92 FL (ref 78–100)
PLATELET # BLD AUTO: 164 10E9/L (ref 150–450)
POTASSIUM SERPL-SCNC: 4.3 MMOL/L (ref 3.4–5.3)
RBC # BLD AUTO: 2.75 10E12/L (ref 4.4–5.9)
SODIUM SERPL-SCNC: 144 MMOL/L (ref 133–144)
WBC # BLD AUTO: 21.2 10E9/L (ref 4–11)

## 2020-09-15 PROCEDURE — 25800030 ZZH RX IP 258 OP 636: Performed by: INTERNAL MEDICINE

## 2020-09-15 PROCEDURE — 00000146 ZZHCL STATISTIC GLUCOSE BY METER IP

## 2020-09-15 PROCEDURE — 85027 COMPLETE CBC AUTOMATED: CPT | Performed by: INTERNAL MEDICINE

## 2020-09-15 PROCEDURE — 99232 SBSQ HOSP IP/OBS MODERATE 35: CPT | Performed by: INTERNAL MEDICINE

## 2020-09-15 PROCEDURE — 25000131 ZZH RX MED GY IP 250 OP 636 PS 637: Performed by: INTERNAL MEDICINE

## 2020-09-15 PROCEDURE — 25000128 H RX IP 250 OP 636: Performed by: INTERNAL MEDICINE

## 2020-09-15 PROCEDURE — 12000000 ZZH R&B MED SURG/OB

## 2020-09-15 PROCEDURE — 80048 BASIC METABOLIC PNL TOTAL CA: CPT | Performed by: INTERNAL MEDICINE

## 2020-09-15 PROCEDURE — 25000132 ZZH RX MED GY IP 250 OP 250 PS 637: Performed by: INTERNAL MEDICINE

## 2020-09-15 PROCEDURE — 36415 COLL VENOUS BLD VENIPUNCTURE: CPT | Performed by: INTERNAL MEDICINE

## 2020-09-15 RX ORDER — FERROUS SULFATE 325(65) MG
325 TABLET, DELAYED RELEASE (ENTERIC COATED) ORAL DAILY
Qty: 36 TABLET | Refills: 3
Start: 2020-09-15 | End: 2020-09-25

## 2020-09-15 RX ORDER — CEFUROXIME AXETIL 500 MG/1
500 TABLET ORAL EVERY 12 HOURS SCHEDULED
Status: DISCONTINUED | OUTPATIENT
Start: 2020-09-15 | End: 2020-09-16 | Stop reason: HOSPADM

## 2020-09-15 RX ORDER — GABAPENTIN 300 MG/1
600 CAPSULE ORAL AT BEDTIME
Refills: 0
Start: 2020-09-15 | End: 2020-09-15

## 2020-09-15 RX ORDER — PREDNISONE 10 MG/1
TABLET ORAL
Qty: 18 TABLET | Refills: 0 | Status: SHIPPED | OUTPATIENT
Start: 2020-09-15 | End: 2020-10-15

## 2020-09-15 RX ORDER — CEFUROXIME AXETIL 250 MG/1
500 TABLET ORAL 2 TIMES DAILY
Qty: 14 TABLET | Refills: 0 | Status: SHIPPED | OUTPATIENT
Start: 2020-09-15 | End: 2020-09-22

## 2020-09-15 RX ORDER — GABAPENTIN 300 MG/1
900 CAPSULE ORAL AT BEDTIME
Refills: 0
Start: 2020-09-15 | End: 2020-09-15

## 2020-09-15 RX ORDER — GABAPENTIN 300 MG/1
600 CAPSULE ORAL AT BEDTIME
Refills: 0
Start: 2020-09-15 | End: 2021-04-13

## 2020-09-15 RX ADMIN — FERROUS SULFATE TAB 325 MG (65 MG ELEMENTAL FE) 325 MG: 325 (65 FE) TAB at 21:27

## 2020-09-15 RX ADMIN — PREDNISONE 40 MG: 20 TABLET ORAL at 08:19

## 2020-09-15 RX ADMIN — METOPROLOL SUCCINATE 12.5 MG: 25 TABLET, EXTENDED RELEASE ORAL at 08:19

## 2020-09-15 RX ADMIN — FAMOTIDINE 20 MG: 20 TABLET ORAL at 21:26

## 2020-09-15 RX ADMIN — CEFUROXIME AXETIL 500 MG: 500 TABLET ORAL at 22:11

## 2020-09-15 RX ADMIN — HYDROXYZINE HYDROCHLORIDE 50 MG: 25 TABLET, FILM COATED ORAL at 21:26

## 2020-09-15 RX ADMIN — AZITHROMYCIN MONOHYDRATE 500 MG: 250 TABLET ORAL at 08:19

## 2020-09-15 RX ADMIN — CEFEPIME HYDROCHLORIDE 1 G: 1 INJECTION, POWDER, FOR SOLUTION INTRAMUSCULAR; INTRAVENOUS at 10:58

## 2020-09-15 RX ADMIN — GABAPENTIN 900 MG: 300 CAPSULE ORAL at 21:26

## 2020-09-15 RX ADMIN — CEFEPIME HYDROCHLORIDE 1 G: 1 INJECTION, POWDER, FOR SOLUTION INTRAMUSCULAR; INTRAVENOUS at 01:07

## 2020-09-15 RX ADMIN — IRON SUCROSE 300 MG: 20 INJECTION, SOLUTION INTRAVENOUS at 08:22

## 2020-09-15 RX ADMIN — FERROUS SULFATE TAB 325 MG (65 MG ELEMENTAL FE) 325 MG: 325 (65 FE) TAB at 08:19

## 2020-09-15 RX ADMIN — FAMOTIDINE 20 MG: 20 TABLET ORAL at 08:19

## 2020-09-15 ASSESSMENT — ACTIVITIES OF DAILY LIVING (ADL)
ADLS_ACUITY_SCORE: 13

## 2020-09-15 NOTE — PROGRESS NOTES
Pt AO4, forgetful.  [place].  A1 GB W.  2L NC.  Voiding per urinal.  Intermittent IV antibiotics.  No complaints throughout shift.

## 2020-09-15 NOTE — ED PROVIDER NOTES
A/O x 4 with forgetfulness, vss, a-febrile, denies shortness of breath, on oxygen at 2 L NC, up with one assist, GB and walker to bathroom, uses bed side, tolerated diet well, skin is intact except bruising.

## 2020-09-15 NOTE — PLAN OF CARE
Shift Note: A&O FADIA, A1 GB walker, 2L NC (baseline), LS diminished, LAROSE, IV anbx, voiding per urinal, discharge to home pending SOB

## 2020-09-15 NOTE — PROGRESS NOTES
[facility]    Hospitalist Progress Note    Assessment & Plan   87 year old male who presents with shortness of breath, dark vomitus     Impression:   Principal Problem:    Respiratory failure with hypoxia and hypercapnia       COPD exacerbation -- improving with steroids    -- was on 1 LPM at night prior to admission, suspect will need O2 continuously now      Possible CAP in RUL and RLL    Leukocytosis -- ?partly 2nd to steroids    -- on Cefepime and Azithromax, and WBC improving    -- home with Ceftin 500 mg bid for another week      Gastric AVM's -- on EGD 8/17/20     Possible UGI bleeding     Anemia, iron deficiency   -- Suspect ongoing blood loss related to AVM's and Eliquis, will hold Eliquis for 1 week, replace iron, and check hgb in clinic on follow-up       FRED (acute kidney injury) -- improved, suspect dehydration     Active Problems:    Paroxysmal atrial fib -- on Eliquis    -- will hold 2nd to persistent Iron def anemia and gastric AVM's       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  Need to order O2 for discharge yet .      DVT Prophylaxis: Eliquis  Code Status: Full Code    Disposition: Expected discharge to home tomorrow.     Giuliano Sharif MD  Pager [phone]  Cell Phone [phone]  Text Page (7am to 6pm)  (35 min total)    Interval History   Feels better today, has dry cough.  No longer jittery.      Physical Exam   Temp: 95.9  F (35.5  C) Temp src: Oral BP: 105/52 Pulse: 88   Resp: 18 SpO2: 95 % O2 Device: Nasal cannula Oxygen Delivery: 3 LPM(thought he choked on some food)  There were no vitals filed for this visit.  Vital Signs with Ranges  Temp:  [95.9  F (35.5  C)-98  F (36.7  C)] 95.9  F (35.5  C)  Pulse:  [79-88] 88  Resp:  [18-20] 18  BP:  (100-118)/(48-52) 105/52  SpO2:  [92 %-95 %] 95 %  I/O last 3 completed shifts:  In: 240 [P.O.:240]  Out: 950 [Urine:950]    # Pain Assessment:  Current Pain Score 9/14/2020   Patient currently in pain? denies   Pain score (0-10) -   Pain location -   Pain descriptors -   Hermilo barrios pain level was assessed and he currently denies pain.        Constitutional: Awake, alert, cooperative, no apparent distress  Respiratory: Clear to auscultation bilaterally  Cardiovascular: Regular rate and rhythm, normal S1 and S2, and no murmur noted  GI: Normal bowel sounds, soft, non-distended, non-tender  Extrem: No calf tenderness, trace ankle edema  Neuro: Ox3, no focal motor or sensory deficits    Medications       ceFEPIme (MAXIPIME) IV  1 g Intravenous Q12H     famotidine  20 mg Oral BID     ferrous sulfate  325 mg Oral BID     gabapentin  900 mg Oral At Bedtime     hydrOXYzine  50 mg Oral At Bedtime     ipratropium-albuterol  1 puff Inhalation 4x Daily AC & HS     metoprolol succinate ER  12.5 mg Oral Daily     predniSONE  40 mg Oral Daily     sodium chloride (PF)  3 mL Intracatheter Q8H     sodium chloride (PF)  3 mL Intracatheter Q8H       Data   Recent Labs   Lab 09/15/20  0735 09/14/20  1153 09/14/20  0606  09/13/20  1256   WBC 21.2*  --  22.5*  --  32.3*   HGB 7.4* 7.5* 7.5*   < > 8.4*   MCV 92  --  91  --  91     --  181  --  223     --  143  --  140   POTASSIUM 4.3  --  4.3  --  4.2   CHLORIDE 117*  --  115*  --  109   CO2 25  --  25  --  25   BUN 35*  --  32*  --  23   CR 1.16  --  1.21  --  1.55*   ANIONGAP 2*  --  3  --  6   AMRITA 7.9*  --  7.5*  --  7.8*   *  --  177*  --  119*   ALBUMIN  --   --   --   --  2.6*   PROTTOTAL  --   --   --   --  6.0*   BILITOTAL  --   --   --   --  0.7   ALKPHOS  --   --   --   --  95   ALT  --   --   --   --  9   AST  --   --   --   --  16    < > = values in this interval not displayed.       Imaging:   No results found for this or any previous visit (from the past 24  hour(s)).

## 2020-09-16 VITALS
DIASTOLIC BLOOD PRESSURE: 43 MMHG | TEMPERATURE: 96.8 F | SYSTOLIC BLOOD PRESSURE: 106 MMHG | RESPIRATION RATE: 16 BRPM | OXYGEN SATURATION: 85 % | HEART RATE: 73 BPM

## 2020-09-16 LAB — LACTATE BLD-SCNC: 0.5 MMOL/L (ref 0.7–2)

## 2020-09-16 PROCEDURE — 83605 ASSAY OF LACTIC ACID: CPT | Performed by: HOSPITALIST

## 2020-09-16 PROCEDURE — 36415 COLL VENOUS BLD VENIPUNCTURE: CPT | Performed by: HOSPITALIST

## 2020-09-16 PROCEDURE — 25000132 ZZH RX MED GY IP 250 OP 250 PS 637: Performed by: INTERNAL MEDICINE

## 2020-09-16 PROCEDURE — 25000131 ZZH RX MED GY IP 250 OP 636 PS 637: Performed by: INTERNAL MEDICINE

## 2020-09-16 RX ADMIN — FERROUS SULFATE TAB 325 MG (65 MG ELEMENTAL FE) 325 MG: 325 (65 FE) TAB at 08:05

## 2020-09-16 RX ADMIN — PREDNISONE 40 MG: 20 TABLET ORAL at 08:05

## 2020-09-16 RX ADMIN — CEFUROXIME AXETIL 500 MG: 500 TABLET ORAL at 08:05

## 2020-09-16 RX ADMIN — FAMOTIDINE 20 MG: 20 TABLET ORAL at 08:05

## 2020-09-16 RX ADMIN — METOPROLOL SUCCINATE 12.5 MG: 25 TABLET, EXTENDED RELEASE ORAL at 08:05

## 2020-09-16 ASSESSMENT — ACTIVITIES OF DAILY LIVING (ADL)
ADLS_ACUITY_SCORE: 11
ADLS_ACUITY_SCORE: 13

## 2020-09-16 NOTE — PROGRESS NOTES
Patient has been assessed for Home Oxygen needs. Oxygen readings:    *Pulse oximetry (SpO2) = 89 % on room air at rest while awake.    *SpO2 improved to 92 % on 2 liters/minute at rest.    *SpO2 = 79 % on room air during activity/with exercise.    *SpO2 improved to 85 % on 4 liters/minute during activity/with exercise.

## 2020-09-16 NOTE — PROGRESS NOTES
Writer called the patient as he discharged to home with new O2 needs.  Per patient he stated he has O2 at home and will call his O2 provider if he needs anything else.

## 2020-09-16 NOTE — PLAN OF CARE
A&O.  VSS, 3L NC.  CMS intact.  LS wheezing on exp.  Denies pain.  Up with 1 and walker.  Started on po abx.  Hgb 7.4. iron infused in AM shift, recheck in AM.  Possible discharge tomorrow.

## 2020-09-16 NOTE — DISCHARGE SUMMARY
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      These results will be followed up by none    Discharge Disposition   Discharged to home  Condition at discharge: Stable     Hospital Course   Hermilo Olson is an 87 year old male who presented with shortness of breath, dark vomitus.        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     Possible UGI bleeding     Anemia, iron deficiency              -- Suspect ongoing blood loss related to AVM's and Eliquis, will hold Eliquis for 1 week, replace iron, and check hgb in clinic on follow-up        FRED (acute kidney injury) -- improved, suspect dehydration      Active Problems:    Paroxysmal atrial fib -- on Eliquis               -- Holding secondary to persistent Iron def anemia and gastric AVM's        Hx of Bilateral PE  2016 -- S/P IVC filter       Essential hypertension with goal blood pressure less than 140/90       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       Areli Davis MD  [facility]  ______________________________________________________________________    Physical Exam   Vital Signs:                   Weight: 0 lbs 0 oz    General: AAOx3, up in chair and appears comfortable.  HEENT: PERRLA EOMI. Mucosa moist.   Lungs: diffusely diminished breath sounds, occ wheezing, normal work of breathing.   CVS: S1S2 regular, no tachycardia or murmur.   Abdomen: Soft, NT, ND. BS heard.  MSK: Trace ankle edema or deformities.  Neuro: AAOX3. CN 2-12 normal. Strength symmetrical.  Skin: No rash.          Primary Care Physician   Karson Bishop    Discharge Orders      Reason for your hospital stay    COPD exacerbation and pneumonia.     Follow-up and recommended labs and tests     Follow up with primary care provider, Karson Bishop, in 1 week, check O2 sat on room air at that time, and check CBC     Activity    Your activity upon discharge: activity as tolerated, would be good to walk daily.     Discharge Instructions    Call Dr. Rodriguez if any medical questions at Cell Phone [phone].     Discharge Instructions    To use supplemental oxygen at 2LPM at night and 5 LPM with activity, use pulse oxymetry at home at try to keep SPO2 >88%     Full Code     Diet    Follow this diet upon discharge: Orders Placed This Encounter      3 Gram Sodium Diet       Significant Results and Procedures   Most Recent 3 CBC's:  Recent Labs   Lab Test 09/28/20  1201 09/25/20  1435 09/15/20  0735 09/14/20  0606 09/14/20  0606   WBC 10.5  --  21.2*  --  22.5*   HGB 9.3* 9.2* 7.4*   < > 7.5*    MCV 92  --  92  --  91     --  164  --  181    < > = values in this interval not displayed.     Most Recent 3 BMP's:  Recent Labs   Lab Test 09/15/20  0735 09/14/20  0606 09/13/20  1256    143 140   POTASSIUM 4.3 4.3 4.2   CHLORIDE 117* 115* 109   CO2 25 25 25   BUN 35* 32* 23   CR 1.16 1.21 1.55*   ANIONGAP 2* 3 6   AMRITA 7.9* 7.5* 7.8*   * 177* 119*     Most Recent 2 LFT's:  Recent Labs   Lab Test 09/13/20  1256 08/16/20  0829   AST 16 7   ALT 9 8   ALKPHOS 95 82   BILITOTAL 0.7 0.5     Most Recent 3 INR's:  Recent Labs   Lab Test 08/17/20  0948 08/16/20  0829 03/28/20  0727   INR 1.21* 1.30* 1.03     Most Recent 3 Troponin's:  Recent Labs   Lab Test 08/15/20  1450 03/23/20  0459 03/22/20  1914   TROPI <0.015 0.738* 0.580*     Most Recent 3 BNP's:  Recent Labs   Lab Test 08/28/20  1145 08/11/20  1247 03/30/20  1500 12/24/19  0721 07/24/18  0805 10/17/14  1655 10/17/14  1655   NTBNPI  --   --  1,470 3,392* 4,717*   < >  --    NTBNP 1,233* 1,593*  --   --   --   --  1,363*    < > = values in this interval not displayed.     Most Recent D-dimer:  Recent Labs   Lab Test 03/30/20  1500   DD 4.6*     Most Recent 6 Bacteria Isolates From Any Culture (See EPIC Reports for Culture Details):  Recent Labs   Lab Test 09/13/20  1329 09/13/20  1256 08/15/20  1451 03/22/20  1950 03/22/20  1941 03/22/20  1935   CULT No growth No growth No growth  No growth No growth Cultured on the 4th day of incubation:  Staphylococcus capitis  Identification obtained by MALDI-TOF mass spectrometry research use only database. Test   characteristics determined and verified by the [facility]   ([facility]) [place], [place].  *  Critical Value/Significant Value, preliminary result only, called to and read back by  ANNABELLE CABRERA RN [phone] 03.26.20 CF    (Note)  POSITIVE for Staphylococci other than S.aureus, S.epidermidis and  S.lugdunensis, by [employer] [place] multiplex nucleic acid  test.  Coagulase-negative staphylococci are the most common venipuncture or  collection associated skin CONTAMINANTS grown in blood cultures.  Final identification and antimicrobial susceptibility testing will be  verified by standard methods.    Specimen tested with [employer] multiplex, gram-positive blood culture  nucleic acid test for the following targets: Staph aureus, Staph  epidermidis, Staph lugdunensis, other Staph species, Enterococcus  faecalis, Enterococcus faecium, Streptococcus species, S. agalactiae,  S. anginosus grp., S. pneumoniae, S. pyogenes, Listeria sp., mecA  (methicillin resistance) and Samanta/B (vancomycin resistance).    Critical Value/Significant Value called to and read back by Doreen Silverman RN on 3.26.20 at 0727. bw   No growth     Most Recent TSH and T4:  Recent Labs   Lab Test 07/31/18  1104 02/21/18  1124   TSH 2.88 2.38   T4  --  1.21     Most Recent 6 glucoses:  Recent Labs   Lab Test 09/15/20  0735 09/14/20  0606 09/13/20  1256 08/17/20  0948 08/16/20  0829 08/15/20  1450   * 177* 119* 92 104* 133*     Most Recent Urinalysis:  Recent Labs   Lab Test 08/16/20  0400 05/13/14  1006 05/13/14  1006   COLOR Yellow   < > Yellow   APPEARANCE Clear   < > Clear   URINEGLC Negative   < > Negative   URINEBILI Negative   < > Negative   URINEKETONE Negative   < > Negative   SG 1.011   < > 1.015   UBLD Negative   < > Small*   URINEPH 7.0   < > 6.0   PROTEIN Negative   < > Negative   UROBILINOGEN  --   --  0.2   NITRITE Negative   < > Negative   LEUKEST Negative   < > Negative   RBCU 0   < > O - 2   WBCU <1   < > O - 2    < > = values in this interval not displayed.   ,   Results for orders placed or performed during the hospital encounter of 09/13/20   CT Chest Pulmonary Embolism w Contrast    Narrative    CT CHEST PULMONARY EMBOLISM W CONTRAST 9/13/2020 1:30 PM    CLINICAL HISTORY: sob, lung CA  TECHNIQUE: CT angiogram chest during arterial phase injection IV  contrast. 2D and 3D MIP  reconstructions were performed by the CT  technologist. Dose reduction techniques were used.     CONTRAST: 66 mL Isovue-370        COMPARISON: 3/22/2020, 3/28/2020    FINDINGS:  ANGIOGRAM CHEST: No central, lobar, segmental pulmonary emboli or  subsegmental pulmonary emboli. Resolution of previously seen right  lower lobe segmental pulmonary emboli. Evaluation of small peripheral  pulmonary emboli at the lung bases is limited by breathing motion.  Aortic valvular prosthesis. CABG. No thoracic aortic dissection.    LUNGS AND PLEURA: Right lower lobe wedge resection. New patchy and  nodular airspace opacities most prominent in the right upper and lower  lobes concerning for infection. Small right pleural effusion, not  significantly changed. There is a trace left pleural effusion.  Previously seen left lung airspace opacities have resolved. Mild left  basilar atelectasis. Centrilobular emphysema.    MEDIASTINUM/AXILLAE: Interval enlargement of right hilar lymph node  measuring 1.3 cm (series 5, image 60). Stable small mediastinal lymph  nodes. Small hiatal hernia. No pericardial effusion.    UPPER ABDOMEN: Stable small hypodense lesions in the liver, too small  to characterize but possibly cysts.    MUSCULOSKELETAL: Old healed rib fractures. Degenerative changes in the  spine.      Impression    IMPRESSION:  1.  No lobar or segmental pulmonary emboli. Evaluation for small  peripheral pulmonary emboli at the lung bases is limited by breathing  motion. Resolution of previously seen right lower lobe segmental  pulmonary embolus.  2.  New patchy airspace opacity in the right upper and lower lobes  concerning for developing infection. Stable small right pleural  effusion.  3.  Resolution of left lung pneumonia and resolved trace left pleural  effusion.  4.  New mildly enlarged 1.2 cm right hilar lymph node. This is  nonspecific, likely reactive.    KALIE CLAIRE MD       Discharge Medications   Discharge Medication List as  of 9/16/2020 12:53 PM      START taking these medications    Details   cefuroxime (CEFTIN) 250 MG tablet Take 2 tablets (500 mg) by mouth 2 times daily for 7 days, Disp-14 tablet,R-0, Local Print      predniSONE (DELTASONE) 10 MG tablet 3 pills daily for 3 days, then 2 pills daily for 3 days, then 1 pill daily for 3 days., Disp-18 tablet,R-0, Local Print         CONTINUE these medications which have CHANGED    Details   gabapentin (NEURONTIN) 300 MG capsule Take 2 capsules (600 mg) by mouth At Bedtime Rx written for 300 mg in the morning and 900 mg at bedtime, but unclear how patient is taking at home (possibly just taking 1 capsule in the evening), R-0, No Print Out      ferrous sulfate (FE TABS) 325 (65 Fe) MG EC tablet Take 1 tablet (325 mg) by mouth daily, Disp-36 tablet,R-3, No Print Out         CONTINUE these medications which have NOT CHANGED    Details   atorvastatin (LIPITOR) 10 MG tablet Take 1 tablet (10 mg) by mouth daily, Disp-90 tablet,R-3, E-Prescribe      hydrOXYzine (VISTARIL) 50 MG capsule Take 50 mg by mouth every 6 hours as needed for itching, Historical      triamcinolone (KENALOG) 0.1 % external cream Apply topically 2 times daily as needed for irritationDisp-453.6 g,[id number]      albuterol (PROAIR HFA/PROVENTIL HFA/VENTOLIN HFA) 108 (90 Base) MCG/ACT Inhaler Inhale 1-2 puffs into the lungs every 6 hours as needed for shortness of breath / dyspnea or wheezing, Disp-3 Inhaler,R-5, E-PrescribePLEASE DISPENSE PROAIR HFA, this is preferred by insurance      famotidine (PEPCID) 40 MG tablet Take 1 tablet (40 mg) by mouth 2 times daily, Disp-60 tablet,R-1, E-Prescribe      metoprolol succinate ER (TOPROL-XL) 25 MG 24 hr tablet Take 0.5 tablets (12.5 mg) by mouth daily, Disp-30 tablet,R-0, E-Prescribe      pantoprazole (PROTONIX) 40 MG EC tablet Take 1 tablet (40 mg) by mouth 2 times daily (before meals), Disp-60 tablet,R-1, E-Prescribe      tiotropium (SPIRIVA HANDIHALER) 18 MCG capsule Inhale  "1 capsule (18 mcg) into the lungs daily, Disp-90 capsule,R-3, E-Prescribe      order for DME Equipment being ordered: Right neoprene knee brace.  ( Fax to [facility] fax  [phone])Disp-1 each,R-0, Local Print      torsemide (DEMADEX) 10 MG tablet Take 10 mg by mouth daily Ordered 9/7/2020 for 30 day supply. Stopped by patient., Historical         STOP taking these medications       acetaminophen (TYLENOL) 325 MG tablet Comments:   Reason for Stopping:         apixaban ANTICOAGULANT (ELIQUIS ANTICOAGULANT) 5 MG tablet Comments:   Reason for Stopping:         HYDROcodone-acetaminophen (NORCO) 5-325 MG tablet Comments:   Reason for Stopping:         hydrOXYzine (ATARAX) 25 MG tablet Comments:   Reason for Stopping:         hydrOXYzine (ATARAX) 25 MG tablet Comments:   Reason for Stopping:             Allergies   Allergies   Allergen Reactions     Lidocaine Blisters     Allergy to lidocaine ointment     Omeprazole Itching     Pantoprazole Itching     Prevacid [Lansoprazole] Itching     Lasix [Furosemide] Rash     Penicillins Rash     \"broke out from injection\" 60 yrs ago       "

## 2020-09-16 NOTE — PROGRESS NOTES
Discharge instructions reviewed with pt and questions answered.  IV removed.  Discharge medications given to pt.  Pt left the unit via wheelchair to awaiting private transportation provided by wife at 1430.  Pt denied pain at time of discharge.

## 2020-09-16 NOTE — PLAN OF CARE
A&Ox4. Up w/Ax1 w/GB. 3G sodium diet. VSS on 2L NC which is baseline. Denies pain. RPIV SL. Patient receiving PO ABX currently. HgB check in AM, low HgB likely due to iron levels. Exp wheezes, discharge possible in AM pending possible sputum collection and Hgb levels

## 2020-09-17 ENCOUNTER — PATIENT OUTREACH (OUTPATIENT)
Dept: NURSING | Facility: CLINIC | Age: 85
End: 2020-09-17
Payer: COMMERCIAL

## 2020-09-17 ENCOUNTER — TELEPHONE (OUTPATIENT)
Dept: FAMILY MEDICINE | Facility: CLINIC | Age: 85
End: 2020-09-17

## 2020-09-17 DIAGNOSIS — J44.1 COPD EXACERBATION (H): ICD-10-CM

## 2020-09-17 DIAGNOSIS — D50.9 ANEMIA, IRON DEFICIENCY: Primary | ICD-10-CM

## 2020-09-17 DIAGNOSIS — J18.9 PNEUMONIA: ICD-10-CM

## 2020-09-17 ASSESSMENT — ACTIVITIES OF DAILY LIVING (ADL): DEPENDENT_IADLS:: INDEPENDENT

## 2020-09-17 NOTE — TELEPHONE ENCOUNTER
ED / Discharge Outreach Protocol    Patient Contact    Attempt # 1    Was call answered?  No.  Left message on voicemail with information to call triage back.    Yamilex EVERETT RN

## 2020-09-17 NOTE — PROGRESS NOTES
Clinic Care Coordination Contact    Clinic Care Coordination Contact  OUTREACH    Referral Information:  Referral Source: IP Report         Chief Complaint   Patient presents with     Clinic Care Coordination - Post Hospital     Hospital follow up     Clinic Care Coordination - Initial        [place] Utilization:   [facility]  Hospitalist Discharge Summary       Date of Admission:  9/13/2020  Date of Discharge:  9/16/2020  Discharging Provider: Areli Davis MD           Discharge Diagnoses       Respiratory failure with hypoxia and hypercapnia     COPD exacerbation     Possible CAP in RUL and RLL    Gastric AVM's -- on EGD 8/17/20     Possible UGI bleeding     Anemia, iron deficiency    Acute kidney injury    Paroxysmal A fib    H/o b/l PE s/p IVC filter    Essential HTN    [facility]        Utilization    Last refreshed: 9/17/2020  8:03 AM:  Hospital Admissions 4           Last refreshed: 9/17/2020  8:03 AM:  ED Visits 4           Last refreshed: 9/17/2020  8:03 AM:  No Show Count (past year) 2              Current as of: 9/17/2020  8:03 AM              Clinical Concerns:  Current Medical Concerns:  Patient denies blood in his stool or in emesis  Patient continue to use iron as directed  Patient talked to the GI provider today over the phone   Patient is on 2L of oxygen with a 93% O2 Sat  Patient denies any wheezing or productive cough   Current Behavioral Concerns: Not discussed     Education Provided to patient: Se above      Health Maintenance Reviewed:    Clinical Pathway: Clinic Care Coordination Pneumonia Assessment  Discharge:    Day of hospital discharge: 9/13/20    What recommendations were made for follow up after your recent  hospitalization? Steriod and antibiotic     Have the follow up appointments been scheduled? Yes    If not, can I help you set up these appointments? No         Is there a referral/need for Pulmonary Rehab? No    Is the patient enrolled in [facility]  Tele-Assurance program? No    Symptom Review:    How have you been feeling now that you are home?  Better    Are you having any increased shortness of breath? No    Are you having any fevers? No    When you cough, are you coughing up anything? No  Medications:     Were you started on any new medications?  Yes, Ceftin Delasone  Eliquis stopped for 1 week    Were any of your previous medications changed? Yes, Eliquis stopped     Do you have all of your medications? Yes,     Have you had trouble filling your prescriptions? No    Are your medications effective in controlling your symptoms? Yes,     Are you currently on Prednisone (* does pt understand the tapering instructions)?  Yes    Medication reconciliation completed? Yes    Oxygen/DME    Are you currently on oxygen? Yes     Is this new for you? No     Is it set up at home? Yes     What liter flow were you instructed to use and how often do you use it? 2 L at night 5L with increased activity Oximeter 93% with 2L of oxygen    Review with patient how to use/maintain nebulizers and inhalers: Yes  Activity:    How much activity can you do before you are SOB? Minimal    Have you had to reduce your activities because of dyspnea or other symptoms?  Pacing activity with no concerns    Were you instructed on how to cough and deep breathe? Yes,   Diet:    Are there any current diet restrictions or changes per discharge instructions? 3 Gm sodium  Emotions/Lifestyle:      Do you smoke? reports that he quit smoking about 22 years ago. He has a 110.00 pack-year smoking history. He has never used smokeless tobacco.  Medication Management:  Medication reconciliation status: Medications reviewed and reconciled.  Changed medications per patient report.     Functional Status:  Dependent ADLs:: Ambulation-cane, Ambulation-walker  Dependent IADLs:: Independent  Bed or wheelchair confined:: No  Mobility Status: Independent w/Device    Living Situation:  Current living arrangement:: I  live in a private home with spouse    Lifestyle & Psychosocial Needs:  Lifestyle     Physical activity     Days per week: 0 days     Minutes per session: 0 min     Stress: Not at all     Social Needs     Financial resource strain: Not hard at all     Food insecurity     Worry: Never true     Inability: Never true     Transportation needs     Medical: No     Non-medical: No              Muslim or spiritual beliefs that impact treatment:: No  Mental health DX:: No  Mental health management concern (GOAL):: No  Informal Support system:: Spouse   Socioeconomic History     Marital status:      Spouse name: Not on file     Number of children: Not on file     Years of education: Not on file     Highest education level: Not on file   Relationships     Social connections     Talks on phone: Twice a week     Gets together: Twice a week     Attends Taoism service: Never     Active member of club or organization: No     Attends meetings of clubs or organizations: Never     Relationship status:      Intimate partner violence     Fear of current or ex partner: No     Emotionally abused: No     Physically abused: No     Forced sexual activity: No     Tobacco Use     Smoking status: Former Smoker     Packs/day: 2.00     Years: 55.00     Pack years: 110.00     Last attempt to quit: 1998     Years since quittin.6     Smokeless tobacco: Never Used   Substance and Sexual Activity     Alcohol use: Yes     Alcohol/week: 0.0 standard drinks     Comment: 1 drink per day     Drug use: No     Sexual activity: Not Currently     Partners: Female               Resources and Interventions:  Current Resources:      Community Resources: None  Supplies used at home:: Oxygen Tubing/Supplies  Equipment Currently Used at Home: cane, straight, walker, rolling    Advance Care Plan/Directive  Advanced Care Plans/Directives on file:: Yes  Type Advanced Care Plans/Directives: (Resuscitation guideline)  Advanced Care  Plan/Directive Status: Not Applicable    Referrals Placed: None     Goals:   Goals        General    Improve chronic symptoms (pt-stated)     Notes - Note created  9/17/2020 11:26 AM by Augusta Swanson RN    Goal Statement: I will seek medical help if experiencing increased shortness of breat episodes,fever,wheezing or coughing up a colored sputum  Date Goal set: 9/17/2020  Barriers: Recent hospitalization  Strengths: Supportive wife   Date to Achieve By: 11/17/2020  Patient expressed understanding of goal: *Yes  Action steps to achieve this goal:  1. I will Follow the COPD Action Plan  2. I will finish the antibiotic and Deltisone  3. I will use oxygen at 2 L at night and 5L with increased activity  4. I will keep my oxygen saturation at 88%  CC will follow up in 1-2 weeks            Monitoring (pt-stated)     Notes - Note created  8/19/2020  9:58 AM by Augusta Swanson, CECILIA    Goal Statement: I will seek medical help if experiencing ,Dizzines,shortness of breath,blood in stool ,abdominal pain or fever   Date Goal set: 8/19/2020  Barriers: None identified   Strengths: feeling stronger now that he is on iron   Date to Achieve By: 10/19/2020  Patient expressed understanding of goal: Yes  Action steps to achieve this goal:  1. I will *take iron as directed   2. I will keep future appointments with the Gastroenterologist   3. CC will follow up in 2-4 weeks             Patient/Caregiver understanding: Expresses good understanding of discharge instructions     Outreach Frequency: weekly  Future Appointments              Today Rn, Cs [facility] MORIS Foster    Tomorrow Karson Bishop MD [facility] MORIS Foster    In 1 week Jamie Ospina MD [facility] [place], [facility]          Plan:  Patient will finish course of antibiotic and steroid and stop Eliquis for 1 week   CC will follow up in 3-5 business days

## 2020-09-17 NOTE — TELEPHONE ENCOUNTER
Chief Complaint: Pneumonia Due To Infectious Organism, Unspecified Laterality, Unspecified Part Of Lung, Neuropathy,  WED 16-SEP-2020  1 / 3    [phone] (home)

## 2020-09-18 ENCOUNTER — VIRTUAL VISIT (OUTPATIENT)
Dept: FAMILY MEDICINE | Facility: CLINIC | Age: 85
End: 2020-09-18
Payer: COMMERCIAL

## 2020-09-18 ENCOUNTER — TELEPHONE (OUTPATIENT)
Dept: FAMILY MEDICINE | Facility: CLINIC | Age: 85
End: 2020-09-18

## 2020-09-18 DIAGNOSIS — K31.819 GASTRIC AVM: ICD-10-CM

## 2020-09-18 DIAGNOSIS — D50.9 IRON DEFICIENCY ANEMIA, UNSPECIFIED IRON DEFICIENCY ANEMIA TYPE: ICD-10-CM

## 2020-09-18 DIAGNOSIS — I26.99 PULMONARY EMBOLISM, BILATERAL (H): ICD-10-CM

## 2020-09-18 DIAGNOSIS — D50.0 IRON DEFICIENCY ANEMIA DUE TO CHRONIC BLOOD LOSS: ICD-10-CM

## 2020-09-18 DIAGNOSIS — D62 ANEMIA DUE TO BLOOD LOSS, ACUTE: ICD-10-CM

## 2020-09-18 DIAGNOSIS — J44.1 COPD EXACERBATION (H): ICD-10-CM

## 2020-09-18 DIAGNOSIS — I48.0 PAROXYSMAL ATRIAL FIBRILLATION (H): ICD-10-CM

## 2020-09-18 DIAGNOSIS — J96.02 ACUTE RESPIRATORY FAILURE WITH HYPOXIA AND HYPERCAPNIA (H): Primary | ICD-10-CM

## 2020-09-18 DIAGNOSIS — J96.01 ACUTE RESPIRATORY FAILURE WITH HYPOXIA AND HYPERCAPNIA (H): Primary | ICD-10-CM

## 2020-09-18 DIAGNOSIS — J44.9 CHRONIC OBSTRUCTIVE PULMONARY DISEASE, UNSPECIFIED COPD TYPE (H): ICD-10-CM

## 2020-09-18 PROCEDURE — 99496 TRANSJ CARE MGMT HIGH F2F 7D: CPT | Mod: 95 | Performed by: INTERNAL MEDICINE

## 2020-09-18 RX ORDER — ALBUTEROL SULFATE 90 UG/1
1-2 AEROSOL, METERED RESPIRATORY (INHALATION) EVERY 6 HOURS PRN
Qty: 3 INHALER | Refills: 5 | Status: SHIPPED | OUTPATIENT
Start: 2020-09-18 | End: 2022-07-22

## 2020-09-18 RX ORDER — TIOTROPIUM BROMIDE 18 UG/1
18 CAPSULE ORAL; RESPIRATORY (INHALATION) DAILY
Qty: 90 CAPSULE | Refills: 3 | Status: ON HOLD | OUTPATIENT
Start: 2020-09-18 | End: 2022-04-11

## 2020-09-18 RX ORDER — FAMOTIDINE 40 MG/1
40 TABLET, FILM COATED ORAL 2 TIMES DAILY
Qty: 60 TABLET | Refills: 1 | Status: SHIPPED | OUTPATIENT
Start: 2020-09-18 | End: 2020-12-23

## 2020-09-18 RX ORDER — METOPROLOL SUCCINATE 25 MG/1
12.5 TABLET, EXTENDED RELEASE ORAL DAILY
Qty: 45 TABLET | Refills: 0 | Status: SHIPPED | OUTPATIENT
Start: 2020-09-18 | End: 2022-07-22

## 2020-09-18 RX ORDER — PANTOPRAZOLE SODIUM 40 MG/1
40 TABLET, DELAYED RELEASE ORAL
Qty: 60 TABLET | Refills: 1 | Status: SHIPPED | OUTPATIENT
Start: 2020-09-18 | End: 2020-09-25

## 2020-09-18 NOTE — TELEPHONE ENCOUNTER
FYI PCP:     Pt has been discharged - has phone visit for hospital follow up today    Yamilex EVERETT RN

## 2020-09-18 NOTE — TELEPHONE ENCOUNTER
Routing refill request to provider for review/approval because:  Last Rx from [facility] discharge notes have to take 1/2 tab of 25mg tablet daily     Yamilex EVERETT RN

## 2020-09-18 NOTE — PROGRESS NOTES
"Hermilo Velasquez is a 87 year old male who is being evaluated via a billable telephone visit.      The patient has been notified of following:     \"This telephone visit will be conducted via a call between you and your physician/provider. We have found that certain health care needs can be provided without the need for a physical exam.  This service lets us provide the care you need with a short phone conversation.  If a prescription is necessary we can send it directly to your pharmacy.  If lab work is needed we can place an order for that and you can then stop by our lab to have the test done at a later time.    Telephone visits are billed at different rates depending on your insurance coverage. During this emergency period, for some insurers they may be billed the same as an in-person visit.  Please reach out to your insurance provider with any questions.    If during the course of the call the physician/provider feels a telephone visit is not appropriate, you will not be charged for this service.\"    Patient has given verbal consent for Telephone visit?  Yes    What phone number would you like to be contacted at? [phone]    How would you like to obtain your AVS? Mail a copy    Subjective     Hermilo Velasquez is a 87 year old male who presents via phone visit today for the following health issues:    [facility]      Hospital Follow-up Visit:    Hospital/Nursing Home/IP Rehab Facility: [facility]  Date of Admission: 9-  Date of Discharge: 9-  Reason(s) for Admission: Respiratory failure with hypoxia and hypercapnia       Was your hospitalization related to COVID-19? No   Problems taking medications regularly:  None  Medication changes since discharge: None  Problems adhering to non-medication therapy:  None    Summary of hospitalization:  [facility] discharge summary reviewed  Diagnostic Tests/Treatments reviewed.  Follow up needed: JACOB, Me  Other Healthcare Providers " Involved in Patient s Care:         None  Update since discharge: improved. Post Discharge Medication Reconciliation: discharge medications reconciled and changed, per note/orders.  Plan of care communicated with patient               87-year-old man with multiple complex medical problems with marginal medical literacy who was hospitalized for dyspnea.  He was apparently treated for pneumonia and a COPD exacerbation.  His CT showed some opacities concerning for emerging infection.  He feels a little bit better.  There is no documentation in the discharge summary, but Chapito himself tells me that he received 2 iron infusions while he was in the hospital.  He was to receive an iron infusion from this clinic prior to him presenting to the emergency department.  Of note, he was hospitalized earlier in the summer with GI bleed.  The discharging plan per the gastroenterologist was to try to continue to keep his hemoglobin up with iron infusions if anticoagulation was considered necessary.  The vascular medicine consultants deemed it necessary to continue with anticoagulants given his history of pulmonary embolism and atrial fibrillation.  The hospital physician on his most recent hospitalization suggested holding the oral anticoagulant for 1 week along with oral iron supplementation to support his hemoglobin.  His discharge hemoglobin was 7.5.  He continues to feel dyspnea.  He is taking the antibiotics and prednisone for suspected COPD exacerbation as directed.  He asked that I write prescriptions for his inhalers and [facility].  There was notation in his earlier in the summer hospitalization that if the hemoglobin could not be maintained with iron infusions, that he should have his AVMs which are thought to be the source of his ongoing GI blood loss addressed.  I think that this would be the more prudent plan.  Chapito agrees with me.    Review of Systems    Constitutional, HEENT, cardiovascular, pulmonary, gi and gu systems are negative, except as otherwise noted.       Objective   Vitals - Patient Reported  SpO2 (Patient Reported): 95  Pulse (Patient Reported): 68      Vitals:  No vitals were obtained today due to virtual visit.    healthy, alert and no distress  PSYCH: Alert and oriented times 3; coherent speech, normal   rate and volume, able to articulate logical thoughts, able   to abstract reason, no tangential thoughts, no hallucinations   or delusions  His affect is normal  RESP: No cough, no audible wheezing, able to talk in full sentences  Remainder of exam unable to be completed due to telephone visits    Labs reviewed         Assessment/Plan:    Assessment & Plan     Hermilo was seen today for hospital f/u.    Diagnoses and all orders for this visit:    Acute respiratory failure with hypoxia and hypercapnia (H)    Iron deficiency anemia, unspecified iron deficiency anemia type  -     GASTROENTEROLOGY ADULT REF CONSULT ONLY; Future  -     **Hemoglobin FUTURE anytime; Future  -     **Iron and iron binding capacity FUTURE anytime; Future    Gastric AVM's -- on EGD 8/17/20  -     GASTROENTEROLOGY ADULT REF CONSULT ONLY; Future    Hx of Bilateral PE 2016 -- S/P IVC filter    Paroxysmal atrial fib -- on Eliquis     COPD exacerbation (H)    Anemia due to blood loss, acute  -     pantoprazole (PROTONIX) 40 MG EC tablet; Take 1 tablet (40 mg) by mouth 2 times daily (before meals)    Chronic obstructive pulmonary disease, unspecified COPD type (H)  -     tiotropium (SPIRIVA HANDIHALER) 18 MCG inhaled capsule; Inhale 1 capsule (18 mcg) into the lungs daily  -     albuterol (PROAIR HFA/PROVENTIL HFA/VENTOLIN HFA) 108 (90 Base) MCG/ACT inhaler; Inhale 1-2 puffs into the lungs every 6 hours as needed for shortness of breath / dyspnea or wheezing    Iron deficiency anemia due to chronic blood loss  -     famotidine (PEPCID) 40 MG tablet; Take 1 tablet (40 mg) by mouth 2  times daily      This is quite a complicated situation.  I think he needs to return to [facility] and they need to consider whether to do some definitive therapy on his previously identified AVMs to stop his chronic GI blood loss.  I do not think long-term cessation of his oral anticoagulant is appropriate given his history of pulmonary embolism and atrial fibrillation.  In fact, I advised Hermilo to restart his Eliquis today.  He can continue with oral iron.  I will arrange for him to have his hemoglobin and iron studies rechecked in my clinic next week.  I think it would be a lot to ask Hermilo to navigate the healthcare system and arrange for an appointment at [facility] himself.  I will asked my nursing staff to kindly arrange for an appointment for him to be seen there to address his upper gastrointestinal AVMs.  This will take a fair amount of care coordination.  [facility].        Return in about 1 week (around 9/25/2020) for Non-fasting Lab Only Visit next week for hgb and iron stores; telephone visit with Dr. Bsihop 3-4 wk.  Patient instructed to return to clinic or contact us sooner if symptoms worsen or new symptoms develop.     Karson Bishop MD  [facility]    Phone call duration:  25 minutes

## 2020-09-18 NOTE — TELEPHONE ENCOUNTER
FYI only PCP:     Karson Bishop MD  [facility] Triage               Can we please call [facility] arrange for him to be seen by Dr. Gr or colleague to address ongoing GI blood loss and severe anemia refractory to iron supplementation from gastric AVM's diagnosed at [facility] in August.   This will need to be in the next 2-3 weeks.  I can speak to an MD there if needed.  I placed an order in Epic.      Called [facility] scheduling - they are currently doing visits via phone (or video) - scheduled phone visit for:     Tues Sept 22nd - 4pm -Dr Dipti Lucas - phone visit  They will be calling him From a blocked number - make sure to answer phone     Discussed with patient - he is agreeable to this     Yamilex EVERETT RN

## 2020-09-18 NOTE — Clinical Note
Can we please call [facility] arrange for him to be seen by Dr. Gr or colleague to address ongoing GI blood loss and severe anemia refractory to iron supplementation from gastric AVM's diagnosed at [facility] in August.   This will need to be in the next 2-3 weeks.  I can speak to an MD there if needed.  I placed an order in Epic.

## 2020-09-22 ENCOUNTER — TRANSFERRED RECORDS (OUTPATIENT)
Dept: HEALTH INFORMATION MANAGEMENT | Facility: CLINIC | Age: 85
End: 2020-09-22

## 2020-09-25 ENCOUNTER — TELEPHONE (OUTPATIENT)
Dept: FAMILY MEDICINE | Facility: CLINIC | Age: 85
End: 2020-09-25

## 2020-09-25 DIAGNOSIS — D50.0 IRON DEFICIENCY ANEMIA DUE TO CHRONIC BLOOD LOSS: ICD-10-CM

## 2020-09-25 DIAGNOSIS — D62 ANEMIA DUE TO BLOOD LOSS, ACUTE: ICD-10-CM

## 2020-09-25 DIAGNOSIS — D50.9 IRON DEFICIENCY ANEMIA, UNSPECIFIED IRON DEFICIENCY ANEMIA TYPE: ICD-10-CM

## 2020-09-25 LAB — HGB BLD-MCNC: 9.2 G/DL (ref 13.3–17.7)

## 2020-09-25 PROCEDURE — 83540 ASSAY OF IRON: CPT | Performed by: INTERNAL MEDICINE

## 2020-09-25 PROCEDURE — 85018 HEMOGLOBIN: CPT | Performed by: INTERNAL MEDICINE

## 2020-09-25 PROCEDURE — 83550 IRON BINDING TEST: CPT | Performed by: INTERNAL MEDICINE

## 2020-09-25 PROCEDURE — 36415 COLL VENOUS BLD VENIPUNCTURE: CPT | Performed by: INTERNAL MEDICINE

## 2020-09-25 RX ORDER — FERROUS SULFATE 325(65) MG
325 TABLET, DELAYED RELEASE (ENTERIC COATED) ORAL DAILY
Qty: 36 TABLET | Refills: 3 | Status: SHIPPED | OUTPATIENT
Start: 2020-09-25 | End: 2020-10-13

## 2020-09-25 RX ORDER — PANTOPRAZOLE SODIUM 40 MG/1
40 TABLET, DELAYED RELEASE ORAL
Qty: 180 TABLET | Refills: 3 | Status: ON HOLD | OUTPATIENT
Start: 2020-09-25 | End: 2021-04-16

## 2020-09-25 RX ORDER — PANTOPRAZOLE SODIUM 40 MG/1
40 TABLET, DELAYED RELEASE ORAL
Qty: 60 TABLET | Refills: 1 | Status: SHIPPED | OUTPATIENT
Start: 2020-09-25 | End: 2020-09-25

## 2020-09-25 NOTE — TELEPHONE ENCOUNTER
Reason for Call:  Medication or medication refill:    Do you use a [facility]?  Name of the pharmacy and phone number for the current request:      [facility] -[id number] [place] [address]      Name of the medication requested: Ferous Sulfate (325 mg ec tab)     PANTOPRAZOLE (PROTONIX)     Other request: Hermilo calling in to get meds refilled. He said that he called [facility] too. Couldn't confirm if [facility] will send us refill or if they weren't. He wasn't clear nor answering question.     He's questioning the Ferous Sulfate (325 mg ec tabs) whether he should stick with the Monday, Wed, Friday inake or should take it every day.     Please call him back.    Can we leave a detailed message on this number? YES    Phone number patient can be reached at: Home number on file [phone] (home)    Best Time: any    Call taken on 9/25/2020 at 10:55 AM by Calvin Paiz

## 2020-09-25 NOTE — TELEPHONE ENCOUNTER
Left detailed message on patient's voice mail (permission per patient during incoming call to clinic) with information/advise from Dr Bishop.     Prescriptions approved per [facility].    Evie VERA RN,BSN

## 2020-09-25 NOTE — TELEPHONE ENCOUNTER
Pt is requesting refills of;    Pantoprazole 40 mg    Ferrous Sulfate: Pt is unsure of when to take these. He thinks it may be Mon Wed Fri, but not sure.    [facility] on [address] in [place]    Please call him at:[phone].    Thank you!!!

## 2020-09-25 NOTE — TELEPHONE ENCOUNTER
Routing to Dr Bishop for advise.     Patient asking if he to continue taking Ferrous Sulfate 325 mg tabs Mon,Wed,Fri---or daily?   Per epic med list----take 1 tab daily.     Rx for Ferrous Sulfate pended for 1 tab daily.     Patient also requesting RX Pantoprazole to local pharmacy.  [facility]    Prescription approved per [facility].    Evie VERA RN,BSN

## 2020-09-25 NOTE — LETTER
September 28, 2020      Hermilo Velasquez  [address] [place] [address]        Dear ,    The following letter pertains to your most recent diagnostic tests:     The hemoglobin is improving and the iron stores are now healthy.  This is good news.  If you can take the iron supplement everyday, hopefully these numbers will stay stable.  I recommend another check in 4 weeks.  You can schedule a lab appointment for that purpose.             Sincerely,     Dr. Bishop    Resulted Orders   **Hemoglobin FUTURE anytime   Result Value Ref Range    Hemoglobin 9.2 (L) 13.3 - 17.7 g/dL   **Iron and iron binding capacity FUTURE anytime   Result Value Ref Range    Iron 74 35 - 180 ug/dL    Iron Binding Cap 246 240 - 430 ug/dL    Iron Saturation Index 30 15 - 46 %

## 2020-09-26 LAB
IRON SATN MFR SERPL: 30 % (ref 15–46)
IRON SERPL-MCNC: 74 UG/DL (ref 35–180)
TIBC SERPL-MCNC: 246 UG/DL (ref 240–430)

## 2020-09-28 ENCOUNTER — OFFICE VISIT (OUTPATIENT)
Dept: OTHER | Facility: CLINIC | Age: 85
End: 2020-09-28
Attending: INTERNAL MEDICINE
Payer: COMMERCIAL

## 2020-09-28 ENCOUNTER — HOSPITAL ENCOUNTER (OUTPATIENT)
Dept: LAB | Facility: CLINIC | Age: 85
End: 2020-09-28
Attending: INTERNAL MEDICINE
Payer: COMMERCIAL

## 2020-09-28 VITALS
SYSTOLIC BLOOD PRESSURE: 128 MMHG | WEIGHT: 177 LBS | HEART RATE: 89 BPM | HEIGHT: 70 IN | OXYGEN SATURATION: 91 % | BODY MASS INDEX: 25.34 KG/M2 | RESPIRATION RATE: 16 BRPM | DIASTOLIC BLOOD PRESSURE: 56 MMHG

## 2020-09-28 DIAGNOSIS — D62 ANEMIA DUE TO BLOOD LOSS, ACUTE: Primary | ICD-10-CM

## 2020-09-28 DIAGNOSIS — N18.30 CKD (CHRONIC KIDNEY DISEASE) STAGE 3, GFR 30-59 ML/MIN (H): ICD-10-CM

## 2020-09-28 DIAGNOSIS — I26.99 ACUTE PULMONARY EMBOLISM WITHOUT ACUTE COR PULMONALE, UNSPECIFIED PULMONARY EMBOLISM TYPE (H): ICD-10-CM

## 2020-09-28 DIAGNOSIS — D62 ANEMIA DUE TO BLOOD LOSS, ACUTE: ICD-10-CM

## 2020-09-28 LAB
ERYTHROCYTE [DISTWIDTH] IN BLOOD BY AUTOMATED COUNT: 18.9 % (ref 10–15)
HCT VFR BLD AUTO: 31 % (ref 40–53)
HGB BLD-MCNC: 9.3 G/DL (ref 13.3–17.7)
LDH SERPL L TO P-CCNC: 216 U/L (ref 85–227)
MCH RBC QN AUTO: 27.5 PG (ref 26.5–33)
MCHC RBC AUTO-ENTMCNC: 30 G/DL (ref 31.5–36.5)
MCV RBC AUTO: 92 FL (ref 78–100)
PLATELET # BLD AUTO: 176 10E9/L (ref 150–450)
RBC # BLD AUTO: 3.38 10E12/L (ref 4.4–5.9)
RETICS # AUTO: 74 10E9/L (ref 25–95)
RETICS/RBC NFR AUTO: 2.2 % (ref 0.5–2)
WBC # BLD AUTO: 10.5 10E9/L (ref 4–11)

## 2020-09-28 PROCEDURE — 99214 OFFICE O/P EST MOD 30 MIN: CPT | Mod: ZP | Performed by: INTERNAL MEDICINE

## 2020-09-28 PROCEDURE — 85045 AUTOMATED RETICULOCYTE COUNT: CPT | Performed by: INTERNAL MEDICINE

## 2020-09-28 PROCEDURE — 83615 LACTATE (LD) (LDH) ENZYME: CPT | Performed by: INTERNAL MEDICINE

## 2020-09-28 PROCEDURE — 36415 COLL VENOUS BLD VENIPUNCTURE: CPT | Performed by: INTERNAL MEDICINE

## 2020-09-28 PROCEDURE — G0463 HOSPITAL OUTPT CLINIC VISIT: HCPCS

## 2020-09-28 PROCEDURE — 83010 ASSAY OF HAPTOGLOBIN QUANT: CPT | Performed by: INTERNAL MEDICINE

## 2020-09-28 PROCEDURE — 85027 COMPLETE CBC AUTOMATED: CPT | Performed by: INTERNAL MEDICINE

## 2020-09-28 ASSESSMENT — MIFFLIN-ST. JEOR: SCORE: 1484.12

## 2020-09-28 NOTE — RESULT ENCOUNTER NOTE
The following letter pertains to your most recent diagnostic tests:    The hemoglobin is improving and the iron stores are now healthy.  This is good news.  If you can take the iron supplement everyday, hopefully these numbers will stay stable.  I recommend another check in 4 weeks.  You can schedule a lab appointment for that purpose.          Sincerely,    Dr. Bishop

## 2020-09-28 NOTE — PROGRESS NOTES
"Hermilo Velasquez is a 87 year old male who presents for:  Chief Complaint   Patient presents with     RECHECK     3 month follow up to 6/26/20; apt changed 8/28/2020 per Evi Anthony. *vg        Vitals:    Vitals:    09/28/20 1039 09/28/20 1040   BP: (!) 123/37 128/56   BP Location: Right arm Left arm   Patient Position: Chair Chair   Cuff Size: Adult Regular Adult Regular   Pulse: 89    Resp: 16    SpO2: 91%    Weight: 177 lb (80.3 kg)    Height: 5' 10\" (1.778 m)        BMI:  Estimated body mass index is 25.4 kg/m  as calculated from the following:    Height as of this encounter: 5' 10\" (1.778 m).    Weight as of this encounter: 177 lb (80.3 kg).    Pain Score:  Data Unavailable        Jazmin Giordano [facility]    "

## 2020-09-29 LAB — HAPTOGLOB SERPL-MCNC: 146 MG/DL (ref 32–197)

## 2020-10-01 ENCOUNTER — PATIENT OUTREACH (OUTPATIENT)
Dept: NURSING | Facility: CLINIC | Age: 85
End: 2020-10-01
Payer: COMMERCIAL

## 2020-10-01 DIAGNOSIS — D50.9 ANEMIA, IRON DEFICIENCY: Primary | ICD-10-CM

## 2020-10-01 ASSESSMENT — ACTIVITIES OF DAILY LIVING (ADL): DEPENDENT_IADLS:: INDEPENDENT

## 2020-10-01 NOTE — PROGRESS NOTES
Clinic Care Coordination Contact    Follow Up Progress Note  Date of Admission:  9/13/2020  Date of Discharge:  9/16/2020  Discharging Provider: Areli Davis MD           Discharge Diagnoses       Respiratory failure with hypoxia and hypercapnia     COPD exacerbation     Possible CAP in RUL and RLL    Gastric AVM's -- on EGD 8/17/20     Possible UGI bleeding     Anemia, iron deficiency    Acute kidney injury    Paroxysmal A fib    H/o b/l PE s/p IVC filter    Essential HTN    [facility]         Assessment: Patient denies any further blood in emesis or stool.  Patient continues to take the iron daily.  Patient wants a clarification on directions for iron  PCP instructed Monday Wed Friday and the hospital provider instructed daily  Patient uses oxygen at night at 2 liters and oxygen saturation rate was 93%  Goals addressed this encounter:   Goals Addressed                 This Visit's Progress      Monitoring (pt-stated)   50%     Goal Statement: I will seek medical help if experiencing ,Dizzines,shortness of breath,blood in stool ,abdominal pain or fever   Date Goal set: 8/19/2020  Barriers: None identified   Strengths: feeling stronger now that he is on iron   Date to Achieve By: 11/19/2020  Patient expressed understanding of goal: Yes  Action steps to achieve this goal:  1. I will take iron as directed   2. I will keep future appointments with the Gastroenterologist   3. CC will follow up in 2-4 weeks           Social Determinants of Health      Tobacco Use  Medium Risk     Alcohol Use  Not on file        Financial Resource Strain  Low Risk      Food Insecurity  No Food Insecurity        Transportation Needs  No Transportation Needs     Physical Activity  Inactive        Stress  No Stress Concern Present     Social Connections  Somewhat Isolated        Intimate Partner Violence  Not At Risk     Depression  Not at risk        Housing Stability  Not on file             Find community resources              Intervention/Education provided during outreach: Continue monitoring Oxygen saturation and blood in the stool and call with concerns     Outreach Frequency: 2 weeks    Plan:   Patient will make a future PCP and Gastroenterology appointment in 1 month  Care Coordinator will follow up in 1-2 weeks   [facility] Nory Swanson RN Care Coordinator   [facility] / [facility]   Phone: [phone]  Email :  [email or web address]

## 2020-10-01 NOTE — PROGRESS NOTES
Dr Bishop advises taking iron  daily if no constipation or nausea.  Patient informed and agrees with the plan  [facility]     Augusta Swanson  RN Care Coordinator   [facility] / [facility] -[facility]   Phone: [phone]  Email :  [email or web address]

## 2020-10-12 ENCOUNTER — TELEPHONE (OUTPATIENT)
Dept: FAMILY MEDICINE | Facility: CLINIC | Age: 85
End: 2020-10-12

## 2020-10-12 DIAGNOSIS — J96.02 ACUTE RESPIRATORY FAILURE WITH HYPOXIA AND HYPERCAPNIA (H): Primary | ICD-10-CM

## 2020-10-12 DIAGNOSIS — J96.01 ACUTE RESPIRATORY FAILURE WITH HYPOXIA AND HYPERCAPNIA (H): Primary | ICD-10-CM

## 2020-10-12 NOTE — TELEPHONE ENCOUNTER
PCP,    Pt needs new O2 tank order.  Pended    Please review and authorize if appropriate.    Thank you,   Tu MADRID RN

## 2020-10-12 NOTE — TELEPHONE ENCOUNTER
Reason for Call:  Medication or medication refill:    Do you use a [facility]?  Name of the pharmacy and phone number for the current request:    [facility] [place]     Name of the medication requested:  Pt needs a new rx for o2 tank    Other request:  Pt had pay out of pocket . Please clarify what type of new this is pt wasn't very clear about what it was     Can we leave a detailed message on this number? YES    Phone number patient can be reached at: Home number on file [phone] (home)    Best Time: any    Call taken on 10/12/2020 at 12:48 PM by Jona Stauffer

## 2020-10-13 ENCOUNTER — TELEPHONE (OUTPATIENT)
Dept: FAMILY MEDICINE | Facility: CLINIC | Age: 85
End: 2020-10-13

## 2020-10-13 DIAGNOSIS — J44.9 CHRONIC OBSTRUCTIVE PULMONARY DISEASE, UNSPECIFIED COPD TYPE (H): Primary | ICD-10-CM

## 2020-10-13 DIAGNOSIS — D50.9 IRON DEFICIENCY ANEMIA, UNSPECIFIED IRON DEFICIENCY ANEMIA TYPE: ICD-10-CM

## 2020-10-13 DIAGNOSIS — D62 ANEMIA DUE TO BLOOD LOSS, ACUTE: ICD-10-CM

## 2020-10-13 NOTE — TELEPHONE ENCOUNTER
Reason for Call:  Medication or medication refill:    Do you use a [facility]?  Name of the pharmacy and phone number for the current request:   N/A        Name of the medication requested:   ferrous sulfate (FE TABS) 325 (65 Fe) MG EC tablet 36 tablet       Other request: Patient also called regarding his O2 tank, [facility] Neal fax [phone]    Also would like a nurse to call him regarding the ferrous sulfate, not sure if he needs a refill or clarify dosing    Can we leave a detailed message on this number? YES    Phone number patient can be reached at: Home number on file [phone] (home)    Best Time: any    Call taken on 10/13/2020 at 2:14 PM by Haydee Canales

## 2020-10-13 NOTE — TELEPHONE ENCOUNTER
Spoke with Pt:     1) Oxygen tank RX He has to give back his oxygen through home medical equipment- he bought a portable concentrator from [employer] and needs a prescription that says he has COPD and that he needs an oxygen concentrator     States he needs the RX sent to him and then he can submit to [facility].     He states that on RA he is still dropping to 70-80's-- with 3L O2, he quickly recovers to >90%     Note: Please mail to Pt's home address.     2) Regarding [facility]     [facility]     Please review and authorize if appropriate    Thank you,   Chrissy CURIEL RN

## 2020-10-14 RX ORDER — FERROUS SULFATE 325(65) MG
325 TABLET, DELAYED RELEASE (ENTERIC COATED) ORAL DAILY
Qty: 90 TABLET | Refills: 3 | Status: SHIPPED | OUTPATIENT
Start: 2020-10-14 | End: 2022-07-22

## 2020-10-14 NOTE — TELEPHONE ENCOUNTER
"TO PCP:     Spoke with patient     Forgot to mention - recently bilateral ankle edema/equal, denies SOB or other concerns. Denies recent weight gain. Scheduled with team visit for tomorrow    Pt would like to  O2 orders tomorrow when he come in     He requested new DME order signed that specifically has \"[employer] concentrator\" listed on it    Also needs a discontinuation order for tank for  home medical to     *added note to pended order to  durbin and give concentrator instead     Yamilex EVERETT RN     "

## 2020-10-14 NOTE — TELEPHONE ENCOUNTER
Next 5 appointments (look out 90 days)    Oct 15, 2020  1:30 PM  Office Visit with ILYA Lopez CNP  [facility] ([facility] [address] [facility] [address] [phone]   Oct 26, 2020 11:40 AM  Return Visit with Jamie Ospina MD  [facility] ([facility] [address]  [facility] [id number] [phone]        ZAHRA Damon - patient requesting copy of printed O2 orders (under other orders tab) when he comes in for visit tomorrow     Yamilex EVERETT RN

## 2020-10-15 ENCOUNTER — OFFICE VISIT (OUTPATIENT)
Dept: FAMILY MEDICINE | Facility: CLINIC | Age: 85
End: 2020-10-15
Payer: COMMERCIAL

## 2020-10-15 VITALS
HEIGHT: 70 IN | OXYGEN SATURATION: 97 % | SYSTOLIC BLOOD PRESSURE: 120 MMHG | TEMPERATURE: 96.8 F | DIASTOLIC BLOOD PRESSURE: 57 MMHG | HEART RATE: 68 BPM | WEIGHT: 173 LBS | BODY MASS INDEX: 24.77 KG/M2

## 2020-10-15 DIAGNOSIS — R60.0 BILATERAL LOWER EXTREMITY EDEMA: Primary | ICD-10-CM

## 2020-10-15 DIAGNOSIS — D62 ANEMIA DUE TO BLOOD LOSS, ACUTE: Primary | ICD-10-CM

## 2020-10-15 DIAGNOSIS — D50.0 IRON DEFICIENCY ANEMIA DUE TO CHRONIC BLOOD LOSS: ICD-10-CM

## 2020-10-15 DIAGNOSIS — Z23 NEED FOR PROPHYLACTIC VACCINATION AND INOCULATION AGAINST INFLUENZA: ICD-10-CM

## 2020-10-15 LAB — HGB BLD-MCNC: 6.9 G/DL (ref 13.3–17.7)

## 2020-10-15 PROCEDURE — 90662 IIV NO PRSV INCREASED AG IM: CPT | Performed by: NURSE PRACTITIONER

## 2020-10-15 PROCEDURE — 83550 IRON BINDING TEST: CPT | Performed by: INTERNAL MEDICINE

## 2020-10-15 PROCEDURE — 99213 OFFICE O/P EST LOW 20 MIN: CPT | Mod: 25 | Performed by: NURSE PRACTITIONER

## 2020-10-15 PROCEDURE — 85018 HEMOGLOBIN: CPT | Performed by: INTERNAL MEDICINE

## 2020-10-15 PROCEDURE — G0008 ADMIN INFLUENZA VIRUS VAC: HCPCS | Performed by: NURSE PRACTITIONER

## 2020-10-15 PROCEDURE — 36415 COLL VENOUS BLD VENIPUNCTURE: CPT | Performed by: INTERNAL MEDICINE

## 2020-10-15 PROCEDURE — 83540 ASSAY OF IRON: CPT | Performed by: INTERNAL MEDICINE

## 2020-10-15 ASSESSMENT — MIFFLIN-ST. JEOR: SCORE: 1465.97

## 2020-10-15 NOTE — PROGRESS NOTES
Chapito's Hemoglobin is back down to 6.9  Can we arrange for him to have a transfusion on 10/16 at [facility]  1 unit PRBCs  If you pend orders I will sign them   If they cannot transfuse him tomorrow, then he will have to go to ER, because we cannot have him go over the weekend with a hemoglobin less than 7    Also tell Chapito that I want him to get a second opinion regarding his ongoing stomach bleeding from a different gastroenterologist  I want him to see Dr. Florentino, I placed a referral in Epic   Can you help him make an appointment   We should recheck his hemoglobin next week  He should schedule a lab appointment for that

## 2020-10-15 NOTE — PROGRESS NOTES
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 Vasques      Aortic stenosis     Severe AS, 9/2015 AVR - [facility] TRIFECTA Bovine bioprosthesis 25MM TF-25A     Atrial fibrillation (H)     9/2015 Paroxysmal post op Afib - discharged on Warfarin and a beta blocker     Deep vein thrombosis (H)      GERD (gastroesophageal reflux disease)      Heart murmur      Monoclonal gammopathy     plasmacyte prominent causing monoclonal gammopathy     Need for SBE (subacute bacterial endocarditis) prophylaxis      Neuropathy      Other and unspecified hyperlipidemia      Other malignant lymphomas     non hodgkin's lymphoma     RBBB (right bundle branch block)      Severe sepsis with acute organ dysfunction (H) 11/16/2015     Unspecified hereditary and idiopathic peripheral neuropathy      Family History   Problem Relation Age of Onset     C.A.D. Father      Emphysema Father      Melanoma No family hx of      Skin Cancer No family hx of      Past Surgical History:   Procedure Laterality Date     APPENDECTOMY       AS TOTAL KNEE ARTHROPLASTY       BACK SURGERY       ESOPHAGOSCOPY, GASTROSCOPY, DUODENOSCOPY (EGD), COMBINED N/A 11/28/2015    Procedure: COMBINED ESOPHAGOSCOPY, GASTROSCOPY, DUODENOSCOPY (EGD);  Surgeon: Danis Castillo MD;  Location:  GI     ESOPHAGOSCOPY, GASTROSCOPY, DUODENOSCOPY (EGD), COMBINED N/A 7/26/2018    Procedure: COMBINED ESOPHAGOSCOPY, GASTROSCOPY, " DUODENOSCOPY (EGD);;  Surgeon: Toby Dong DO;  Location:  GI     ESOPHAGOSCOPY, GASTROSCOPY, DUODENOSCOPY (EGD), COMBINED N/A 2020    Procedure: ESOPHAGOGASTRODUODENOSCOPY (EGD);  Surgeon: Herrera Henry MD;  Location:  GI     HERNIA REPAIR  2006     HERNIORRHAPHY VENTRAL  2013    Procedure: HERNIORRHAPHY VENTRAL;  VENTRAL HERNIA REPAIR WITH MESH;  Surgeon: Patel Guzman MD;  Location:  OR     KNEE SURGERY      arthroscopic right knee surgery      LOBECTOMY LUNG Right 3/26/2019    POSSIBLE LOBECTOMY LUNG per Dr. Vasques at [facility]     REPAIR LIGAMENT ANKLE  2012    Procedure:REPAIR LIGAMENT ANKLE; LEFT TARSAL TUNNEL RELEASE OF KNOT OF ARIEL RELEASE; Surgeon:SAUL PUENTE; Location: OR     REPLACE VALVE AORTIC N/A 9/3/2015    Procedure: REPLACE VALVE AORTIC;  Surgeon: Antonino Mitchell MD;  Location:  OR     ROTATOR CUFF REPAIR RT/LT      bilateral     SPINE SURGERY      3 spine surgeries     THORACOTOMY, WEDGE RESECTION LUNG, COMBINED Right 3/26/2019    RIGHT THORACOTOMY, WEDGE RESECTION, RIGHT LOWER LOBE LUNG NODULE,;  Surgeon: Jose A Vasques MD;  Location:  OR     TONSILLECTOMY       TURP       Social History     Tobacco Use     Smoking status: Former Smoker     Packs/day: 2.00     Years: 55.00     Pack years: 110.00     Quit date: 1998     Years since quittin.7     Smokeless tobacco: Never Used   Substance Use Topics     Alcohol use: Yes     Alcohol/week: 0.0 standard drinks     Comment: 1 drink per day     Current Outpatient Medications   Medication Sig Dispense Refill     albuterol (PROAIR HFA/PROVENTIL HFA/VENTOLIN HFA) 108 (90 Base) MCG/ACT inhaler Inhale 1-2 puffs into the lungs every 6 hours as needed for shortness of breath / dyspnea or wheezing 3 Inhaler 5     atorvastatin (LIPITOR) 10 MG tablet Take 1 tablet (10 mg) by mouth daily 90 tablet 3     famotidine (PEPCID) 40 MG tablet Take 1 tablet (40 mg) by mouth 2 times  "daily 60 tablet 1     ferrous sulfate (FE TABS) 325 (65 Fe) MG EC tablet Take 1 tablet (325 mg) by mouth daily 90 tablet 3     gabapentin (NEURONTIN) 300 MG capsule Take 2 capsules (600 mg) by mouth At Bedtime Rx written for 300 mg in the morning and 900 mg at bedtime, but unclear how patient is taking at home (possibly just taking 1 capsule in the evening)  0     hydrOXYzine (VISTARIL) 50 MG capsule Take 50 mg by mouth every 6 hours as needed for itching       metoprolol succinate ER (TOPROL-XL) 25 MG 24 hr tablet Take 0.5 tablets (12.5 mg) by mouth daily 45 tablet 0     order for DME Equipment being ordered: Right neoprene knee brace.  ( Fax to [facility] fax  [phone]) 1 each 0     pantoprazole (PROTONIX) 40 MG EC tablet Take 1 tablet (40 mg) by mouth 2 times daily (before meals) 180 tablet 3     tiotropium (SPIRIVA HANDIHALER) 18 MCG inhaled capsule Inhale 1 capsule (18 mcg) into the lungs daily 90 capsule 3     torsemide (DEMADEX) 10 MG tablet Take 10 mg by mouth daily Ordered 9/7/2020 for 30 day supply. Stopped by patient.       triamcinolone (KENALOG) 0.1 % external cream Apply topically 2 times daily as needed for irritation 453.6 g 3     Allergies   Allergen Reactions     Lidocaine Blisters     Allergy to lidocaine ointment     Omeprazole Itching     Pantoprazole Itching     Prevacid [Lansoprazole] Itching     Lasix [Furosemide] Rash     Penicillins Rash     \"broke out from injection\" 60 yrs ago         Reviewed and updated as needed this visit by clinical staff and provider       Review of Systems   Detailed as above       Objective    /57 (BP Location: Left arm, Patient Position: Sitting, Cuff Size: Adult Regular)   Pulse 68   Temp 96.8  F (36  C) (Temporal)   Ht 1.778 m (5' 10\")   Wt 78.5 kg (173 lb)   SpO2 97%   BMI 24.82 kg/m    There is no height or weight on file to calculate BMI.  Physical Exam  Constitutional:       Appearance: Normal appearance.   HENT:      Head: " Normocephalic.   Eyes:      Conjunctiva/sclera: Conjunctivae normal.   Cardiovascular:      Rate and Rhythm: Normal rate and regular rhythm.      Heart sounds: Normal heart sounds. No murmur.   Pulmonary:      Effort: Pulmonary effort is normal. No respiratory distress.      Breath sounds: Normal breath sounds.   Musculoskeletal:      Right lower leg: Edema present.      Left lower leg: Edema present.      Comments: 1-2+ pitting edema    Skin:     General: Skin is warm and dry.   Neurological:      Mental Status: He is alert.   Psychiatric:         Mood and Affect: Mood normal.                Assessment & Plan     Assessment and Plan:       ICD-10-CM    1. Bilateral lower extremity edema  R60.0    2. Need for prophylactic vaccination and inoculation against influenza  Z23 FLUZONE HIGH DOSE 65+  [95353]     ADMIN INFLUENZA (For MEDICARE Patients ONLY) []       SHANTE for the last month or so. Suspect this is multifactorial: anemia, low albumin, among other possibilities. As his anemia was improved on last check, that is likely why the edema is better today. Will rechecklasb as was previously ordered by his pcp. He has follow-up already scheduled for next week. Instructed to elevate when at home. May consider compression stockings if worsening. Ultimately, addressing the anemia and low albumin will be best, which is being well-managed by his pcp.        ILYA Lopez CNP  [facility]

## 2020-10-15 NOTE — PATIENT INSTRUCTIONS
I think there are a few things causing the swelling today. I think the anemia (the blood loss) is a large part. The swelling will probably     Try to keep your legs elevated when you are at home sitting     If your legs stay swollen, then you can consider compression stockings that you can just  at the pharmacy.

## 2020-10-16 LAB
IRON SATN MFR SERPL: 18 % (ref 15–46)
IRON SERPL-MCNC: 52 UG/DL (ref 35–180)
TIBC SERPL-MCNC: 283 UG/DL (ref 240–430)

## 2020-10-16 NOTE — PROGRESS NOTES
To PCP:     Please see signed order for (NON-irradiated) PRBCs (1 unit)     Please route back to Triage once signed and we will call Infusion Center and Nomi SWAN upon opening     Thank you,   Chrissy ALICEA RN

## 2020-10-20 ENCOUNTER — PATIENT OUTREACH (OUTPATIENT)
Dept: NURSING | Facility: CLINIC | Age: 85
End: 2020-10-20
Payer: COMMERCIAL

## 2020-10-20 ASSESSMENT — ACTIVITIES OF DAILY LIVING (ADL): DEPENDENT_IADLS:: INDEPENDENT

## 2020-10-20 NOTE — PROGRESS NOTES
Clinic Care Coordination Contact    Follow Up Progress Note      Assessment: Denies any further blood in the stool.  Continues to take iron daily.  Patient uses his oxygen at 2 liters at night only   Leg edema has gone down and is elevating legs     Goals addressed this encounter:   Goals Addressed                 This Visit's Progress      Improve chronic symptoms (pt-stated)   50%     Goal Statement: I will seek medical help if experiencing increased shortness of breat episodes,fever,wheezing or coughing up a colored sputum  Date Goal set: 9/17/2020  Barriers: Recent hospitalization  Strengths: Supportive wife   Date to Achieve By: 11/17/2020  Patient expressed understanding of goal: *Yes  Action steps to achieve this goal:  1. I will Follow the COPD Action Plan  2. I will finish the antibiotic and Deltisone  3. I will use oxygen at 2 L at night and 5L with increased activity  4. I will keep my oxygen saturation at 88%  CC will follow up in 1-2 weeks              Monitoring (pt-stated)   60%     Goal Statement: I will seek medical help if experiencing ,Dizzines,shortness of breath,blood in stool ,abdominal pain or fever   Date Goal set: 8/19/2020  Barriers: None identified   Strengths: feeling stronger now that he is on iron   Date to Achieve By: 11/19/2020  Patient expressed understanding of goal: Yes  Action steps to achieve this goal:  1. I will take iron as directed   2. I will keep future appointments with the Gastroenterologist   3. CC will follow up in 2-4 weeks              Intervention/Education provided during outreach: Call CC for any future questions or concerns   Outreach Frequency: 2 weeks    Plan:   Patient will continue to monitor blood in the stool,take iron daily and elevate legs as much as possible   Patient will keep future appointments with PCP and vascular surgeon next week   Care Coordinator will follow up in 1-2 weeks  [facility] Nory Swanson RN Care Coordinator   [facility]  [facility] / [facility]   Phone: [phone]  Email :  [email or web address]

## 2020-10-23 ENCOUNTER — HOSPITAL ENCOUNTER (INPATIENT)
Facility: CLINIC | Age: 85
LOS: 3 days | Discharge: HOME | DRG: 378 | End: 2020-10-26
Attending: EMERGENCY MEDICINE | Admitting: INTERNAL MEDICINE
Payer: COMMERCIAL

## 2020-10-23 ENCOUNTER — VIRTUAL VISIT (OUTPATIENT)
Dept: FAMILY MEDICINE | Facility: CLINIC | Age: 85
End: 2020-10-23
Payer: COMMERCIAL

## 2020-10-23 DIAGNOSIS — D50.0 IRON DEFICIENCY ANEMIA DUE TO CHRONIC BLOOD LOSS: Primary | ICD-10-CM

## 2020-10-23 DIAGNOSIS — D64.9 ANEMIA, UNSPECIFIED TYPE: ICD-10-CM

## 2020-10-23 DIAGNOSIS — K31.819 GASTRIC AVM: ICD-10-CM

## 2020-10-23 DIAGNOSIS — K92.2 GASTROINTESTINAL HEMORRHAGE, UNSPECIFIED GASTROINTESTINAL HEMORRHAGE TYPE: ICD-10-CM

## 2020-10-23 LAB
ABO + RH BLD: NORMAL
ABO + RH BLD: NORMAL
ALBUMIN SERPL-MCNC: 2.6 G/DL (ref 3.4–5)
ALP SERPL-CCNC: 115 U/L (ref 40–150)
ALT SERPL W P-5'-P-CCNC: 10 U/L (ref 0–70)
ANION GAP SERPL CALCULATED.3IONS-SCNC: 5 MMOL/L (ref 3–14)
APTT PPP: 36 SEC (ref 22–37)
AST SERPL W P-5'-P-CCNC: 12 U/L (ref 0–45)
BASOPHILS # BLD AUTO: 0 10E9/L (ref 0–0.2)
BASOPHILS NFR BLD AUTO: 0.1 %
BILIRUB SERPL-MCNC: 0.1 MG/DL (ref 0.2–1.3)
BLD GP AB SCN SERPL QL: NORMAL
BLD PROD TYP BPU: NORMAL
BLOOD BANK CMNT PATIENT-IMP: NORMAL
BUN SERPL-MCNC: 22 MG/DL (ref 7–30)
CALCIUM SERPL-MCNC: 7.9 MG/DL (ref 8.5–10.1)
CHLORIDE SERPL-SCNC: 112 MMOL/L (ref 94–109)
CO2 SERPL-SCNC: 25 MMOL/L (ref 20–32)
CREAT SERPL-MCNC: 1.25 MG/DL (ref 0.66–1.25)
DIFFERENTIAL METHOD BLD: ABNORMAL
EOSINOPHIL # BLD AUTO: 0.7 10E9/L (ref 0–0.7)
EOSINOPHIL NFR BLD AUTO: 8.3 %
ERYTHROCYTE [DISTWIDTH] IN BLOOD BY AUTOMATED COUNT: 15.2 % (ref 10–15)
GFR SERPL CREATININE-BSD FRML MDRD: 51 ML/MIN/{1.73_M2}
GLUCOSE SERPL-MCNC: 95 MG/DL (ref 70–99)
HCT VFR BLD AUTO: 22.5 % (ref 40–53)
HEMOCCULT STL QL: POSITIVE
HGB BLD-MCNC: 6.6 G/DL (ref 13.3–17.7)
IMM GRANULOCYTES # BLD: 0 10E9/L (ref 0–0.4)
IMM GRANULOCYTES NFR BLD: 0.3 %
INR PPP: 1.39 (ref 0.86–1.14)
LABORATORY COMMENT REPORT: NORMAL
LYMPHOCYTES # BLD AUTO: 0.9 10E9/L (ref 0.8–5.3)
LYMPHOCYTES NFR BLD AUTO: 11 %
MCH RBC QN AUTO: 26 PG (ref 26.5–33)
MCHC RBC AUTO-ENTMCNC: 29.3 G/DL (ref 31.5–36.5)
MCV RBC AUTO: 89 FL (ref 78–100)
MONOCYTES # BLD AUTO: 1 10E9/L (ref 0–1.3)
MONOCYTES NFR BLD AUTO: 11.9 %
NEUTROPHILS # BLD AUTO: 5.5 10E9/L (ref 1.6–8.3)
NEUTROPHILS NFR BLD AUTO: 68.4 %
NRBC # BLD AUTO: 0 10*3/UL
NRBC BLD AUTO-RTO: 0 /100
NUM BPU REQUESTED: 2
PLATELET # BLD AUTO: 279 10E9/L (ref 150–450)
POTASSIUM SERPL-SCNC: 3.9 MMOL/L (ref 3.4–5.3)
PROT SERPL-MCNC: 5.8 G/DL (ref 6.8–8.8)
RBC # BLD AUTO: 2.54 10E12/L (ref 4.4–5.9)
SARS-COV-2 RNA SPEC QL NAA+PROBE: NEGATIVE
SARS-COV-2 RNA SPEC QL NAA+PROBE: NORMAL
SODIUM SERPL-SCNC: 142 MMOL/L (ref 133–144)
SPECIMEN EXP DATE BLD: NORMAL
SPECIMEN SOURCE: NORMAL
SPECIMEN SOURCE: NORMAL
WBC # BLD AUTO: 8 10E9/L (ref 4–11)

## 2020-10-23 PROCEDURE — 86901 BLOOD TYPING SEROLOGIC RH(D): CPT | Performed by: EMERGENCY MEDICINE

## 2020-10-23 PROCEDURE — 99285 EMERGENCY DEPT VISIT HI MDM: CPT | Mod: 25

## 2020-10-23 PROCEDURE — 96365 THER/PROPH/DIAG IV INF INIT: CPT

## 2020-10-23 PROCEDURE — 120N000001 HC R&B MED SURG/OB

## 2020-10-23 PROCEDURE — 96366 THER/PROPH/DIAG IV INF ADDON: CPT

## 2020-10-23 PROCEDURE — 86900 BLOOD TYPING SEROLOGIC ABO: CPT | Performed by: EMERGENCY MEDICINE

## 2020-10-23 PROCEDURE — 250N000011 HC RX IP 250 OP 636: Performed by: INTERNAL MEDICINE

## 2020-10-23 PROCEDURE — 86850 RBC ANTIBODY SCREEN: CPT | Performed by: EMERGENCY MEDICINE

## 2020-10-23 PROCEDURE — C9803 HOPD COVID-19 SPEC COLLECT: HCPCS

## 2020-10-23 PROCEDURE — 85730 THROMBOPLASTIN TIME PARTIAL: CPT | Performed by: EMERGENCY MEDICINE

## 2020-10-23 PROCEDURE — C9113 INJ PANTOPRAZOLE SODIUM, VIA: HCPCS | Performed by: EMERGENCY MEDICINE

## 2020-10-23 PROCEDURE — 258N000003 HC RX IP 258 OP 636: Performed by: EMERGENCY MEDICINE

## 2020-10-23 PROCEDURE — 80053 COMPREHEN METABOLIC PANEL: CPT | Performed by: EMERGENCY MEDICINE

## 2020-10-23 PROCEDURE — 258N000003 HC RX IP 258 OP 636: Performed by: INTERNAL MEDICINE

## 2020-10-23 PROCEDURE — P9016 RBC LEUKOCYTES REDUCED: HCPCS | Performed by: EMERGENCY MEDICINE

## 2020-10-23 PROCEDURE — 250N000011 HC RX IP 250 OP 636: Performed by: EMERGENCY MEDICINE

## 2020-10-23 PROCEDURE — U0003 INFECTIOUS AGENT DETECTION BY NUCLEIC ACID (DNA OR RNA); SEVERE ACUTE RESPIRATORY SYNDROME CORONAVIRUS 2 (SARS-COV-2) (CORONAVIRUS DISEASE [COVID-19]), AMPLIFIED PROBE TECHNIQUE, MAKING USE OF HIGH THROUGHPUT TECHNOLOGIES AS DESCRIBED BY CMS-2020-01-R: HCPCS | Performed by: EMERGENCY MEDICINE

## 2020-10-23 PROCEDURE — 86923 COMPATIBILITY TEST ELECTRIC: CPT | Performed by: EMERGENCY MEDICINE

## 2020-10-23 PROCEDURE — 82272 OCCULT BLD FECES 1-3 TESTS: CPT | Performed by: EMERGENCY MEDICINE

## 2020-10-23 PROCEDURE — 36430 TRANSFUSION BLD/BLD COMPNT: CPT

## 2020-10-23 PROCEDURE — C9113 INJ PANTOPRAZOLE SODIUM, VIA: HCPCS | Performed by: INTERNAL MEDICINE

## 2020-10-23 PROCEDURE — 85610 PROTHROMBIN TIME: CPT | Performed by: EMERGENCY MEDICINE

## 2020-10-23 PROCEDURE — 96376 TX/PRO/DX INJ SAME DRUG ADON: CPT

## 2020-10-23 PROCEDURE — 250N000013 HC RX MED GY IP 250 OP 250 PS 637: Performed by: INTERNAL MEDICINE

## 2020-10-23 PROCEDURE — 85025 COMPLETE CBC W/AUTO DIFF WBC: CPT | Performed by: EMERGENCY MEDICINE

## 2020-10-23 PROCEDURE — 99223 1ST HOSP IP/OBS HIGH 75: CPT | Mod: AI | Performed by: INTERNAL MEDICINE

## 2020-10-23 PROCEDURE — 99215 OFFICE O/P EST HI 40 MIN: CPT | Mod: 95 | Performed by: INTERNAL MEDICINE

## 2020-10-23 RX ORDER — NALOXONE HYDROCHLORIDE 0.4 MG/ML
.1-.4 INJECTION, SOLUTION INTRAMUSCULAR; INTRAVENOUS; SUBCUTANEOUS
Status: DISCONTINUED | OUTPATIENT
Start: 2020-10-23 | End: 2020-10-26 | Stop reason: HOSPADM

## 2020-10-23 RX ORDER — ONDANSETRON 2 MG/ML
4 INJECTION INTRAMUSCULAR; INTRAVENOUS EVERY 6 HOURS PRN
Status: DISCONTINUED | OUTPATIENT
Start: 2020-10-23 | End: 2020-10-26 | Stop reason: HOSPADM

## 2020-10-23 RX ORDER — SODIUM CHLORIDE 9 MG/ML
INJECTION, SOLUTION INTRAVENOUS CONTINUOUS
Status: DISCONTINUED | OUTPATIENT
Start: 2020-10-23 | End: 2020-10-25

## 2020-10-23 RX ORDER — HYDROXYZINE PAMOATE 25 MG/1
50 CAPSULE ORAL EVERY 6 HOURS PRN
Status: DISCONTINUED | OUTPATIENT
Start: 2020-10-23 | End: 2020-10-24

## 2020-10-23 RX ORDER — ACETAMINOPHEN 325 MG/1
650 TABLET ORAL EVERY 4 HOURS PRN
Status: DISCONTINUED | OUTPATIENT
Start: 2020-10-23 | End: 2020-10-26 | Stop reason: HOSPADM

## 2020-10-23 RX ORDER — GABAPENTIN 300 MG/1
600 CAPSULE ORAL AT BEDTIME
Status: DISCONTINUED | OUTPATIENT
Start: 2020-10-23 | End: 2020-10-26 | Stop reason: HOSPADM

## 2020-10-23 RX ORDER — ACETAMINOPHEN 650 MG/1
650 SUPPOSITORY RECTAL EVERY 4 HOURS PRN
Status: DISCONTINUED | OUTPATIENT
Start: 2020-10-23 | End: 2020-10-26 | Stop reason: HOSPADM

## 2020-10-23 RX ORDER — FERROUS SULFATE 325(65) MG
325 TABLET ORAL DAILY
Status: DISCONTINUED | OUTPATIENT
Start: 2020-10-24 | End: 2020-10-26 | Stop reason: HOSPADM

## 2020-10-23 RX ORDER — LIDOCAINE 40 MG/G
CREAM TOPICAL
Status: DISCONTINUED | OUTPATIENT
Start: 2020-10-23 | End: 2020-10-26 | Stop reason: HOSPADM

## 2020-10-23 RX ORDER — HYDROCODONE BITARTRATE AND ACETAMINOPHEN 5; 325 MG/1; MG/1
1-2 TABLET ORAL EVERY 4 HOURS PRN
Status: DISCONTINUED | OUTPATIENT
Start: 2020-10-23 | End: 2020-10-24

## 2020-10-23 RX ORDER — HEPARIN SODIUM,PORCINE 10 UNIT/ML
5 VIAL (ML) INTRAVENOUS
Status: CANCELLED | OUTPATIENT
Start: 2020-10-23

## 2020-10-23 RX ORDER — HEPARIN SODIUM (PORCINE) LOCK FLUSH IV SOLN 100 UNIT/ML 100 UNIT/ML
5 SOLUTION INTRAVENOUS
Status: CANCELLED | OUTPATIENT
Start: 2020-10-23

## 2020-10-23 RX ORDER — ATORVASTATIN CALCIUM 10 MG/1
10 TABLET, FILM COATED ORAL DAILY
Status: DISCONTINUED | OUTPATIENT
Start: 2020-10-24 | End: 2020-10-26 | Stop reason: HOSPADM

## 2020-10-23 RX ORDER — MORPHINE SULFATE 2 MG/ML
1 INJECTION, SOLUTION INTRAMUSCULAR; INTRAVENOUS
Status: DISCONTINUED | OUTPATIENT
Start: 2020-10-23 | End: 2020-10-24

## 2020-10-23 RX ORDER — ONDANSETRON 4 MG/1
4 TABLET, ORALLY DISINTEGRATING ORAL EVERY 6 HOURS PRN
Status: DISCONTINUED | OUTPATIENT
Start: 2020-10-23 | End: 2020-10-26 | Stop reason: HOSPADM

## 2020-10-23 RX ORDER — ALBUTEROL SULFATE 90 UG/1
1-2 AEROSOL, METERED RESPIRATORY (INHALATION) EVERY 6 HOURS PRN
Status: DISCONTINUED | OUTPATIENT
Start: 2020-10-23 | End: 2020-10-26 | Stop reason: HOSPADM

## 2020-10-23 RX ADMIN — SODIUM CHLORIDE: 9 INJECTION, SOLUTION INTRAVENOUS at 22:45

## 2020-10-23 RX ADMIN — PANTOPRAZOLE SODIUM 80 MG: 40 INJECTION, POWDER, FOR SOLUTION INTRAVENOUS at 16:57

## 2020-10-23 RX ADMIN — GABAPENTIN 600 MG: 300 CAPSULE ORAL at 21:13

## 2020-10-23 RX ADMIN — PANTOPRAZOLE SODIUM 40 MG: 40 INJECTION, POWDER, FOR SOLUTION INTRAVENOUS at 22:27

## 2020-10-23 RX ADMIN — SODIUM CHLORIDE 8 MG/HR: 9 INJECTION, SOLUTION INTRAVENOUS at 16:57

## 2020-10-23 ASSESSMENT — ENCOUNTER SYMPTOMS
FATIGUE: 1
WEAKNESS: 1
SHORTNESS OF BREATH: 1
LIGHT-HEADEDNESS: 1

## 2020-10-23 NOTE — PROGRESS NOTES
RECEIVING UNIT ED HANDOFF REVIEW    ED Nurse Handoff Report was reviewed by: Arianna Valdez RN on October 23, 2020 at 6:50 PM

## 2020-10-23 NOTE — PROGRESS NOTES
"Hermilo Velasquez is a 87 year old male who is being evaluated via a billable telephone visit.      The patient has been notified of following:     \"This telephone visit will be conducted via a call between you and your physician/provider. We have found that certain health care needs can be provided without the need for a physical exam.  This service lets us provide the care you need with a short phone conversation.  If a prescription is necessary we can send it directly to your pharmacy.  If lab work is needed we can place an order for that and you can then stop by our lab to have the test done at a later time.    Telephone visits are billed at different rates depending on your insurance coverage. During this emergency period, for some insurers they may be billed the same as an in-person visit.  Please reach out to your insurance provider with any questions.    If during the course of the call the physician/provider feels a telephone visit is not appropriate, you will not be charged for this service.\"    Patient has given verbal consent for Telephone visit?  Yes    What phone number would you like to be contacted at? [phone]    How would you like to obtain your AVS? Mail a copy    Subjective     Hermilo Velasquez is a 87 year old male who presents via phone visit today for the following health issues:    HPI     Follow Up     I'm not sure what happened, but he did not get contacted about his hemoglobin last week and  He feels mildly short of breath, no chest pain  His legs are swollen which is his primary concern  He stools are black, but no different than baseline since he is on oral iron    Review of Systems   Constitutional, HEENT, cardiovascular, pulmonary, gi and gu systems are negative, except as otherwise noted.       Objective          Vitals:  No vitals were obtained today due to virtual visit.    healthy, alert and no distress  PSYCH: Alert and oriented times 3; coherent speech, normal   rate and " volume, able to articulate logical thoughts, able   to abstract reason, no tangential thoughts, no hallucinations   or delusions  His affect is normal  RESP: No cough, no audible wheezing, able to talk in full sentences  Remainder of exam unable to be completed due to telephone visits    Again hemoglobin 6.9        Assessment/Plan:    Assessment & Plan     Hermilo was seen today for follow up.    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 Florentino for a second opinion on how to manage his ongoing GI blood loss; this referral was placed last week  Of course the transfusion is the most pressing issue  The swelling can be addressed when the sever anemia is stabilized               Return today (on 10/23/2020) for Transfusion ASAP .    Karson Bishop MD  [facility]    Phone call duration:  11 minutes          I called [facility] infusion [facility]  I asked if they could provide the transfusion tonight  Despite my pressing they said first available is first week of Nov  I called Chapito  I strongly recommended ER tonight for transfusion  Perhaps an inpatient GI consult would be prudent to address the AVMs?

## 2020-10-23 NOTE — ED PROVIDER NOTES
History     Chief Complaint:  Abnormal Labs     HPI:  Hermilo Velasquez is a 87 year old male on eliquis with a history of DVT and iron deficiency anemia due to chronic GI bleeding from gastric AVMs who presents with abnormal labs. Patient was seen at primary last week on 10/15/2020 at which time hemoglobin was noted to be low (6.9). He had a virtual visit today and was advised to come into the ED for a transfusion. Here patient reports weakness, lightheadedness, and mild fatigue for the last few days. Patient also reports some shortness of breath with exertion for the last week, which he states is worse than his COPD flares.    Endoscopy 8/17/2020  IMPRESSION:  - Normal esophagus.   - 3 cm hiatal hernia.    - Erythematous mucosa in the antrum.    - Normal duodenal bulb and second portion of the   duodenum.    - A single non-bleeding angioectasia in the   duodenum.    - No specimens collected.   - Likely source of anemia is AVMs in the GI tract,    seen on prior EGD/Colon and likely has small bowel   AVMs, as well. At this point, if Eliquis can be   stopped the recurrent anemia may stop. Patient   could have hgb checked on a regular basis and    pursue IV iron infusions to try to keep his hgb up.    if unable to keep hgb up with intermittent IV iron,   then contact [facility].     Allergies:  Lidocaine  Omeprazole  Pantoprazole  Prevacid   Lasix  Penicillins  Eliquis     Medications:    Albuterol inhaler  Lipitor  Pepcid  Ferrous sulfate  Gabapentin  Hydroxyzine  Toprol  Protonix  Spiriva inhaler  Demadex  Flomax  Xarelto    Past Medical History:    Adenocarcinoma, right lung  Aortic stenosis  Atrial fibrillation  DVT  GERD  Heart murmur  Monoclonal gammopathy  Neuropathy  Hyperlipidemia  Right bundle branch block  Sepsis  Non Hodgkin's lymphoma  Pulmonary nodules  Hypertension  COPD  Centrilobular emphysema  Bullous pemphigoid  Stage 3 CKD  Gastric AVMs     Past Surgical History:    Appendectomy  TKA  Back  "surgery  EGD x3  Herniorrhaphy  Right knee arthroscopy  Lung lobectomy  Left tarsal tunnel release  Replace aortic valve  Bilateral rotator cuff repair  Spine surgery x3  Thoracotomy, wedge resection lung  Tonsillectomy  TURP     Family History:    CAD  Emphysema     Social History:  Smoking status: former, quit 1998  Alcohol use: yes  Drug use: no  Patient presents alone.  PCP: Karson Bishop    Marital Status:      Review of Systems   Constitutional: Positive for fatigue.   Respiratory: Positive for shortness of breath.    Neurological: Positive for weakness and light-headedness.   All other systems reviewed and are negative.        Physical Exam     Vitals:  Patient Vitals for the past 24 hrs:   BP Temp Temp src Pulse Resp SpO2 Height   10/23/20 1826 -- 97.7  F (36.5  C) Oral -- 17 -- --   10/23/20 1800 109/54 97.4  F (36.3  C) Oral 74 18 100 % --   10/23/20 1730 -- -- -- -- -- 100 % --   10/23/20 1700 106/42 -- -- 69 -- -- --   10/23/20 1630 102/44 -- -- -- -- -- --   10/23/20 1600 109/56 -- -- 94 -- (!) 75 % --   10/23/20 1547 (!) 95/39 97.5  F (36.4  C) Oral 63 20 95 % 1.778 m (5' 10\")       Physical Exam:   General: Alert, interactive in mild distress  Head:  Scalp is atraumatic  Eyes:  The pupils are equal, round, and reactive to light    EOM's intact    No scleral icterus  ENT:      Nose:  The external nose is normal  Ears:  External ears are normal  Mouth/Throat: The oropharynx is normal    Mucus membranes are moist dry      Neck:  Normal range of motion.      There is no rigidity.    Trachea is in the midline         CV:  Regular rate and rhythm    2/6 systolic murmur.    Resp:  Breath sounds are clear bilaterally    Non-labored, no retractions or accessory muscle use      GI:  Abdomen is soft, no distension, no tenderness.       MS:  Normal strength in all 4 extremities, Trace pedal edema.    Skin:  Warm and dry, No rash or lesions noted.  Neuro: Strength 5/5 x4.  Sensation intact  In all 4 " extremities.      Hard of hearing.    Psych:  Awake. Alert.  Normal affect.      Appropriate interactions.    Emergency Department Course     Laboratory:  CBC: WBC 8.0, HGB 6.6 (LL),     CMP: Chloride 112 (H), GFR 51 (L), Calcium 7.9 (L), Bilirubin 0.1 (L), Albumin 2.6 (L), Protein total 5.8 (L) o/w WNL (Creatinine 1.25)    PTT: 36    ABO/RH: ABO A, Rh Pos, Antibody Neg    INR: 1.39 (H)    Occult blood stool: positive (A)    Blood component: RBC, leukocyte reduced    Asymptomatic COVID 19 virus: pending    Interventions:  1657 Protonix, 80 mg, IV  1657 Protonix, 8 mg/hr, IV  pRBC's 2 units    Emergency Department Course:  1554  Past medical records, nursing notes, and vitals reviewed.    I performed an exam of the patient and obtained history, as documented above.    1622 IV was inserted and blood was drawn for laboratory testing, results above.   The patient provided a stool sample here in the emergency department. This was sent for laboratory testing, findings above.    1639 Findings and plan explained to the Patient who consents to admission. Discussed the patient with Dr. Garcia, who will admit the patient to for further monitoring, evaluation, and treatment.    I personally reviewed the laboratory results with the Patient who voiced understanding of the findings. I answered all related questions prior to admission.     Impression & Plan     Covid-19  Hermilo Velasquez was evaluated during a global COVID-19 pandemic, which necessitated consideration that the patient might be at risk for infection with the SARS-CoV-2 virus that causes COVID-19.   Applicable protocols for evaluation were followed during the patient's care.   COVID-19 was considered as part of the patient's evaluation. The plan for testing is:  a test was obtained during this visit.    Medical Decision Making:  Hermilo Velasquez was seen and evaluated. Findings are consistent with a GI bleed, likely a slow, chronic process. He received  Protonix and blood transfusion here. He will likely require endoscopy. He remained hemodynamically stable throughout his stay in the ED. As laboratory workup is otherwise stable from prior, there is no signs of acute infectious etiology. I spoke with Dr. Garcia who is in agreement with admission for further evaluation and treatment.    Diagnosis:    ICD-10-CM   1. Gastrointestinal hemorrhage, unspecified gastrointestinal hemorrhage type  K92.2   2. Anemia, unspecified type  D64.9        Disposition:  Admitted to Dr. Garcia.    Scribe Disclosure:  I, Georgie Baldwin, am serving as a scribe at 3:54 PM on 10/23/2020 to document services personally performed by Lane Moctezuma MD based on my observations and the provider's statements to me.       Georgie Baldwin  10/23/2020     Lane Moctezuma MD  10/23/20 2130       Lane Moctezuma MD  10/23/20 2147

## 2020-10-23 NOTE — ED NOTES
"[facility]  ED Nurse Handoff Report    ED Chief complaint: Abnormal Labs      ED Diagnosis:   Final diagnoses:   Gastrointestinal hemorrhage, unspecified gastrointestinal hemorrhage type   Anemia, unspecified type       Code Status: Full Code    Allergies:   Allergies   Allergen Reactions     Lidocaine Blisters     Allergy to lidocaine ointment     Omeprazole Itching     Pantoprazole Itching     Prevacid [Lansoprazole] Itching     Lasix [Furosemide] Rash     Penicillins Rash     \"broke out from injection\" 60 yrs ago         Patient Story: Pt presents with Low Hgb. Pt has been feeling weak and sob for past few days. Pt went to PCP for appt, HGB 6.9. Sent to ED.   Focused Assessment:  +weakness, SOB with movement, and blood in stool.     Treatments and/or interventions provided: +IVF and start 1 Unit of RBC  Patient's response to treatments and/or interventions: Slight Improvement.     To be done/followed up on inpatient unit:  Cont to Monitor    Does this patient have any cognitive concerns?: NONE, KADIE    Activity level - Baseline/Home:  Independent  Activity Level - Current:   Independent    Patient's Preferred language: English   Needed?: No    Isolation: None  Infection: Not Applicable  Patient tested for COVID 19 prior to admission: YES  Bariatric?: No    Vital Signs:   Vitals:    10/23/20 1547 10/23/20 1600 10/23/20 1800   BP: (!) 95/39 109/56 109/54   Pulse: 63 94 74   Resp: 20  18   Temp: 97.5  F (36.4  C)  97.4  F (36.3  C)   TempSrc: Oral  Oral   SpO2: 95% (!) 75% 100%   Height: 1.778 m (5' 10\")         Cardiac Rhythm:     Was the PSS-3 completed:   Yes  What interventions are required if any?      None required          Family Comments: Wife available by phone  OBS brochure/video discussed/provided to patient/family: N/A              Name of person given brochure if not patient: NA              Relationship to patient: NA    For the majority of the shift this patient's behavior " was Davion.   Behavioral interventions performed were NA.    ED NURSE PHONE NUMBER: [id number]

## 2020-10-24 LAB
ANION GAP SERPL CALCULATED.3IONS-SCNC: 3 MMOL/L (ref 3–14)
BLD PROD TYP BPU: NORMAL
BLD UNIT ID BPU: 0
BLOOD PRODUCT CODE: NORMAL
BPU ID: NORMAL
BUN SERPL-MCNC: 16 MG/DL (ref 7–30)
CALCIUM SERPL-MCNC: 7.9 MG/DL (ref 8.5–10.1)
CHLORIDE SERPL-SCNC: 113 MMOL/L (ref 94–109)
CO2 SERPL-SCNC: 25 MMOL/L (ref 20–32)
CREAT SERPL-MCNC: 1.19 MG/DL (ref 0.66–1.25)
GFR SERPL CREATININE-BSD FRML MDRD: 54 ML/MIN/{1.73_M2}
GLUCOSE SERPL-MCNC: 86 MG/DL (ref 70–99)
HGB BLD-MCNC: 7.3 G/DL (ref 13.3–17.7)
HGB BLD-MCNC: 7.4 G/DL (ref 13.3–17.7)
POTASSIUM SERPL-SCNC: 4.2 MMOL/L (ref 3.4–5.3)
SODIUM SERPL-SCNC: 141 MMOL/L (ref 133–144)
TRANSFUSION STATUS PATIENT QL: NORMAL
TRANSFUSION STATUS PATIENT QL: NORMAL
UPPER GI ENDOSCOPY: NORMAL

## 2020-10-24 PROCEDURE — G0500 MOD SEDAT ENDO SERVICE >5YRS: HCPCS | Performed by: INTERNAL MEDICINE

## 2020-10-24 PROCEDURE — 250N000011 HC RX IP 250 OP 636: Performed by: INTERNAL MEDICINE

## 2020-10-24 PROCEDURE — 99232 SBSQ HOSP IP/OBS MODERATE 35: CPT | Performed by: INTERNAL MEDICINE

## 2020-10-24 PROCEDURE — 43235 EGD DIAGNOSTIC BRUSH WASH: CPT | Performed by: INTERNAL MEDICINE

## 2020-10-24 PROCEDURE — 0DJ08ZZ INSPECTION OF UPPER INTESTINAL TRACT, VIA NATURAL OR ARTIFICIAL OPENING ENDOSCOPIC: ICD-10-PCS | Performed by: INTERNAL MEDICINE

## 2020-10-24 PROCEDURE — 120N000001 HC R&B MED SURG/OB

## 2020-10-24 PROCEDURE — 36415 COLL VENOUS BLD VENIPUNCTURE: CPT | Performed by: INTERNAL MEDICINE

## 2020-10-24 PROCEDURE — 85018 HEMOGLOBIN: CPT | Performed by: INTERNAL MEDICINE

## 2020-10-24 PROCEDURE — 80048 BASIC METABOLIC PNL TOTAL CA: CPT | Performed by: INTERNAL MEDICINE

## 2020-10-24 PROCEDURE — 250N000013 HC RX MED GY IP 250 OP 250 PS 637: Performed by: INTERNAL MEDICINE

## 2020-10-24 PROCEDURE — C9113 INJ PANTOPRAZOLE SODIUM, VIA: HCPCS | Performed by: INTERNAL MEDICINE

## 2020-10-24 RX ORDER — POLYETHYLENE GLYCOL 3350 17 G/17G
238 POWDER, FOR SOLUTION ORAL ONCE
Status: COMPLETED | OUTPATIENT
Start: 2020-10-24 | End: 2020-10-24

## 2020-10-24 RX ORDER — NALOXONE HYDROCHLORIDE 0.4 MG/ML
.1-.4 INJECTION, SOLUTION INTRAMUSCULAR; INTRAVENOUS; SUBCUTANEOUS
Status: DISCONTINUED | OUTPATIENT
Start: 2020-10-24 | End: 2020-10-24

## 2020-10-24 RX ORDER — HYDROXYZINE PAMOATE 25 MG/1
50 CAPSULE ORAL EVERY 8 HOURS PRN
Status: DISCONTINUED | OUTPATIENT
Start: 2020-10-24 | End: 2020-10-26 | Stop reason: HOSPADM

## 2020-10-24 RX ORDER — FENTANYL CITRATE 50 UG/ML
INJECTION, SOLUTION INTRAMUSCULAR; INTRAVENOUS PRN
Status: DISCONTINUED | OUTPATIENT
Start: 2020-10-24 | End: 2020-10-24 | Stop reason: HOSPADM

## 2020-10-24 RX ADMIN — POLYETHYLENE GLYCOL 3350 238 G: 17 POWDER, FOR SOLUTION ORAL at 16:33

## 2020-10-24 RX ADMIN — PANTOPRAZOLE SODIUM 40 MG: 40 INJECTION, POWDER, FOR SOLUTION INTRAVENOUS at 08:24

## 2020-10-24 RX ADMIN — GABAPENTIN 600 MG: 300 CAPSULE ORAL at 21:11

## 2020-10-24 RX ADMIN — ATORVASTATIN CALCIUM 10 MG: 10 TABLET, FILM COATED ORAL at 08:27

## 2020-10-24 RX ADMIN — FERROUS SULFATE TAB 325 MG (65 MG ELEMENTAL FE) 325 MG: 325 (65 FE) TAB at 08:26

## 2020-10-24 RX ADMIN — UMECLIDINIUM 1 PUFF: 62.5 AEROSOL, POWDER ORAL at 08:27

## 2020-10-24 RX ADMIN — PANTOPRAZOLE SODIUM 40 MG: 40 INJECTION, POWDER, FOR SOLUTION INTRAVENOUS at 21:11

## 2020-10-24 ASSESSMENT — ACTIVITIES OF DAILY LIVING (ADL)
ADLS_ACUITY_SCORE: 12

## 2020-10-24 NOTE — CONSULTS
[facility]  Gastroenterology Consultation         Hermilo Velasquez  [address]  [facility] [address]  87 year old male    Admission Date/Time: 10/23/2020  Primary Care Provider: Karson Bishop  Referring / Attending Physician: Dr. Garcia    We were asked to see the patient in consultation by Dr. Garcia for evaluation of acute on chronic anemia.      CC: Acute on chronic anemia.    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  Patient has history of gastroesophageal reflux disease.  Patient is currently on Eliquis.  Patient has history of chronic kidney disease iron deficiency anemia on iron supplements.  Patient does not have any clinical signs of active bleeding patient denies history of hematemesis melena rectal bleeding bruising or any otherSigns or symptoms consistent with GI blood loss.  Patient has previous endoscopy and colonoscopy with findings suggestive of AVMs however no signs of active bleeding.    ROS: A comprehensive ten point review of systems was negative aside from those in mentioned in the HPI.      PAST MED HX:  I have reviewed this patient's medical history and updated it with pertinent information if needed.   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 Vasques      Aortic stenosis     Severe AS, 9/2015 AVR - [facility] TRIFECTA Bovine bioprosthesis 25MM [id number]     Atrial fibrillation (H)     9/2015 Paroxysmal post op Afib -  discharged on Warfarin and a beta blocker     COPD (chronic obstructive pulmonary disease) (H)      Deep vein thrombosis (H)      GERD (gastroesophageal reflux disease)      Heart murmur      Monoclonal gammopathy     plasmacyte prominent causing monoclonal gammopathy     Need for SBE (subacute bacterial endocarditis) prophylaxis      Neuropathy      Other and unspecified hyperlipidemia      Other malignant lymphomas     non hodgkin's lymphoma     RBBB (right bundle branch block)      Severe sepsis with acute organ dysfunction (H) 11/16/2015     Unspecified hereditary and idiopathic peripheral neuropathy        MEDICATIONS:   Prior to Admission Medications   Prescriptions Last Dose Informant Patient Reported? Taking?   albuterol (PROAIR HFA/PROVENTIL HFA/VENTOLIN HFA) 108 (90 Base) MCG/ACT inhaler PRN at PRN Self No No   Sig: Inhale 1-2 puffs into the lungs every 6 hours as needed for shortness of breath / dyspnea or wheezing   atorvastatin (LIPITOR) 10 MG tablet 10/23/2020 at AM Self No Yes   Sig: Take 1 tablet (10 mg) by mouth daily   famotidine (PEPCID) 40 MG tablet 10/23/2020 at AM Self No Yes   Sig: Take 1 tablet (40 mg) by mouth 2 times daily   ferrous sulfate (FE TABS) 325 (65 Fe) MG EC tablet 10/23/2020 at AM Self No Yes   Sig: Take 1 tablet (325 mg) by mouth daily   gabapentin (NEURONTIN) 300 MG capsule 10/22/2020 at HS Self No Yes   Sig: Take 2 capsules (600 mg) by mouth At Bedtime Rx written for 300 mg in the morning and 900 mg at bedtime, but unclear how patient is taking at home (possibly just taking 1 capsule in the evening)   hydrOXYzine (VISTARIL) 50 MG capsule PRN at PRN Self Yes No   Sig: Take 50 mg by mouth every 6 hours as needed for itching   metoprolol succinate ER (TOPROL-XL) 25 MG 24 hr tablet 10/23/2020 at AM Self No Yes   Sig: Take 0.5 tablets (12.5 mg) by mouth daily   order for DME  Self No No   Sig: Equipment being ordered: Right neoprene knee brace.  ( Fax to [facility] "[employer]" fax   "[phone])   pantoprazole (PROTONIX) 40 MG EC tablet 10/23/2020 at AM Self No Yes   Sig: Take 1 tablet (40 mg) by mouth 2 times daily (before meals)   tiotropium (SPIRIVA HANDIHALER) 18 MCG inhaled capsule 10/23/2020 at AM Self No Yes   Sig: Inhale 1 capsule (18 mcg) into the lungs daily   torsemide (DEMADEX) 10 MG tablet 10/23/2020 at AM Self Yes Yes   Sig: Take 10 mg by mouth daily Ordered 2020 for 30 day supply. Stopped by patient.   triamcinolone (KENALOG) 0.1 % external cream PRN at PRN Self No No   Sig: Apply topically 2 times daily as needed for irritation      Facility-Administered Medications: None       ALLERGIES:   Allergies   Allergen Reactions     Lidocaine Blisters     Allergy to lidocaine ointment     Omeprazole Itching     Pantoprazole Itching     Prevacid [Lansoprazole] Itching     Lasix [Furosemide] Rash     Penicillins Rash     \"broke out from injection\" 60 yrs ago         SOCIAL HISTORY:  Social History     Tobacco Use     Smoking status: Former Smoker     Packs/day: 2.00     Years: 55.00     Pack years: 110.00     Quit date: 1998     Years since quittin.7     Smokeless tobacco: Never Used   Substance Use Topics     Alcohol use: Yes     Alcohol/week: 0.0 standard drinks     Comment: 1 drink per day     Drug use: No       FAMILY HISTORY:  Family History   Problem Relation Age of Onset     C.A.D. Father      Emphysema Father      Melanoma No family hx of      Skin Cancer No family hx of        PHYSICAL EXAM:   General awake alert responding appropriately  Vital Signs with Ranges  Temp: 97.2  F (36.2  C) Temp src: Oral BP: 109/54 Pulse: 68   Resp: 16 SpO2: 94 % O2 Device: None (Room air) Oxygen Delivery: 3 LPM  I/O last 3 completed shifts:  In: 620 [P.O.:120]  Out: 425 [Urine:425]    Constitutional: healthy, alert and no distress   Cardiovascular: negative, PMI normal. No lifts, heaves, or thrills. RRR. No murmurs, clicks gallops or rub  Respiratory: negative, Percussion normal. " Good diaphragmatic excursion. Lungs clear  Neck: Neck supple. No adenopathy. Thyroid symmetric, normal size,, Carotids without bruits.  Abdomen: Abdomen soft, non-tender. BS normal. No masses, organomegaly  SKIN: no suspicious lesions or rashes          ADDITIONAL COMMENTS:   I reviewed the patient's new clinical lab test results.   Recent Labs   Lab Test 10/24/20  0823 10/24/20  0037 10/23/20  1617 09/28/20  1201 09/28/20  1201 09/15/20  0735 09/15/20  0735 08/17/20  0948 08/17/20  0948 08/16/20  0829   WBC  --   --  8.0  --  10.5  --  21.2*   < > 8.9 9.5   HGB 7.4* 7.3* 6.6*   < > 9.3*   < > 7.4*   < > 7.7* 7.5*   MCV  --   --  89  --  92  --  92   < > 82 81   PLT  --   --  279  --  176  --  164   < > 273 272   INR  --   --  1.39*  --   --   --   --   --  1.21* 1.30*    < > = values in this interval not displayed.     Recent Labs   Lab Test 10/24/20  0823 10/23/20  1617 09/15/20  0735   POTASSIUM 4.2 3.9 4.3   CHLORIDE 113* 112* 117*   CO2 25 25 25   BUN 16 22 35*   ANIONGAP 3 5 2*     Recent Labs   Lab Test 10/23/20  1617 09/13/20  1256 08/16/20  0829 08/16/20  0400 03/22/20  1950 03/22/20  1950 03/22/20  1914 12/22/19  1614   ALBUMIN 2.6* 2.6* 2.3*  --    < >  --  2.6*  --    BILITOTAL 0.1* 0.7 0.5  --    < >  --  0.6  --    ALT 10 9 8  --    < >  --  27  --    AST 12 16 7  --    < >  --  101*  --    PROTEIN  --   --   --  Negative  --  10*  --  10*   LIPASE  --   --   --   --   --   --  54*  --     < > = values in this interval not displayed.       I reviewed the patient's new imaging results.        CONSULTATION ASSESSMENT AND PLAN:    Principal Problem:    Gastrointestinal hemorrhage, unspecified gastrointestinal hemorrhage type    Assessment:     Very pleasant 87-year-old gentleman with history of complicated multiple chronic health problems in the past including history of acute on chronic anemia chronic kidney disease lung cancer status post wedge resection of right lower lobe along with history of  bilateral PE patient is currently on Eliquis due to A. fib now patient presented again with drop in hemoglobin to 6.6 which was 9.3 in September.  Patient findings are quite suggestive of chronic low-grade GI blood loss most likely due to AVMs.  Possible differential can include neoplastic process chronic kidney disease anemia of chronic disease.  Patient has been on supplement iron.  Patient's last MCV was 90.  Patient has blood transfusion.  I will recommend the patient to be reevaluated with upper GI endoscopy if negative then colonoscopy possible argon treatment for AVMs if any signs of bleeding.  However patient findings are quite worrisome for possible anemia of chronic disease I will recommend patient to be evaluated further in hematology for further evaluation and management.  Patient Eliquis also is probably contributing into his slow chronic blood loss.  Risk-benefit plan discussed in detail with patient.            Vick Florentino MD, [facility]  [facility].  Office: [phone]  Cell :     Chart reviewed.  Acute on chronic anemia.  Serial Hb.  I/V Protonix  Hold [facility]  Plan for EGD in AM.  NPO after midnight.    Vick Florentino GI

## 2020-10-24 NOTE — PROGRESS NOTES
DATE & TIME: 10/24/2020 5461-2913  Cognitive Concerns/ Orientation : A&Ox4  BEHAVIOR & AGGRESSION TOOL COLOR: Green   CIWA SCORE: N/A  ABNL VS/O2: VSS on 2-3L NC  MOBILITY: Ax1 GB/cane  PAIN MANAGMENT: Denies  DIET: Clear liquid, NPO at midnight for colonoscopy tomorrow.  BOWEL/BLADDER: Incontinent at times  ABNL LAB/BG: Hgb 7.4  DRAIN/DEVICES: 2 PIVs. IVF infusing NS at 50 ml/hr. Difficult stick.   TELEMETRY RHYTHM: N/A  SKIN: WDL  TESTS/PROCEDURES: Colonoscopy tomorrow.  D/C DAY/GOALS/PLACE: Pending GI workup.   OTHER IMPORTANT INFO:

## 2020-10-24 NOTE — PROGRESS NOTES
[facility]    Hospitalist Progress Note    Date of Service (when I saw the patient): 10/24/2020    Assessment & Plan   Hermilo Velasquez is a pleasant 87 year old gentleman admitted on 10/23/2020 after presenting to the ED for evaluation of abnormal labs and possible GI bleed.  Current problems include:     Gastrointestinal bleed; unclear source.    - Dr. Florentino GI to consult later this morning  - continue monitoring hemoglobin  - NPO    Anemia due to acute blood loss; stable. Hgb improved.  - transfuse to keep hemoglobin greater than 7 g/dL  - continue protonix 40 mg IV BID    GERD; stable and symptoms controlled  - continue PPI  - GI consult requested    Paroxysmal A-fib; rate controlled.  - holding Eliquis     Non-Hodgkin's lymphoma  Adeno CA Lung; RLL wedge resection  3/26/2019  - followed by Dr. Bradley    Dyslipidemia; stable  - continue atorvastatin    CKD stage 3; stable  - repeat BMP in a.m.      DVT Prophylaxis: Pneumatic Compression Devices and Ambulate every shift  Code Status: Full Code    Disposition: Expected discharge in 1-2 days.      YASIR Hale MD, [facility]     Internal Medicine Hospitalist  Text Page (7am - 6pm)    Interval History   BP's lower this a.m.: 88/48; no new orthostatic Sx. No f/c/SOB; no chest or abdominal pain.    Data reviewed today: I reviewed all new labs and imaging results over the last 24 hours. I personally reviewed all recent labs/imaging results.    Physical Exam   Temp: 98.1  F (36.7  C) Temp src: Oral BP: (!) 88/48 Pulse: 69   Resp: 18 SpO2: 91 % O2 Device: Nasal cannula Oxygen Delivery: 2 LPM  There were no vitals filed for this visit.  Vital Signs with Ranges  Temp:  [97.4  F (36.3  C)-98.1  F (36.7  C)] 98.1  F (36.7  C)  Pulse:  [63-94] 69  Resp:  [16-20] 18  BP: ()/(39-56) 88/48  FiO2 (%):  [2 %] 2 %  SpO2:  [75 %-100 %] 91 %  I/O last 3 completed shifts:  In: 500   Out: 425 [Urine:425]     Constitutional: awake, no apparent distress;  lying in bed  HEENT: sclerae clear; MM's moist  Respiratory: good a/e bilaterally, no wheezing or rhonchi  Cardiovascular: Regular rate and rhythm, S1, S2 noted; no m/r/g  GI: abdomen flat, + bowel sounds; soft, non-tender, non-distended  Skin/Integumen: no rashes, no cyanosis, no jaundice  Musculoskeletal: no edema  Neurologic: follows directions well; no focal deficits      Medications     sodium chloride 50 mL/hr at 10/23/20 2245       atorvastatin  10 mg Oral Daily     ferrous sulfate  325 mg Oral Daily     gabapentin  600 mg Oral At Bedtime     pantoprazole (PROTONIX) IV  40 mg Intravenous Q12H     sodium chloride (PF)  3 mL Intracatheter Q8H     umeclidinium  1 puff Inhalation Daily       Data   Recent Labs   Lab 10/24/20  0823 10/24/20  0037 10/23/20  1617   WBC  --   --  8.0   HGB 7.4* 7.3* 6.6*   MCV  --   --  89   PLT  --   --  279   INR  --   --  1.39*     --  142   POTASSIUM 4.2  --  3.9   CHLORIDE 113*  --  112*   CO2 25  --  25   BUN 16  --  22   CR 1.19  --  1.25   ANIONGAP 3  --  5   AMRITA 7.9*  --  7.9*   GLC 86  --  95   ALBUMIN  --   --  2.6*   PROTTOTAL  --   --  5.8*   BILITOTAL  --   --  0.1*   ALKPHOS  --   --  115   ALT  --   --  10   AST  --   --  12

## 2020-10-24 NOTE — PLAN OF CARE
DATE & TIME: 10/23/2020 0404-7264  Cognitive Concerns/ Orientation : A&Ox4  BEHAVIOR & AGGRESSION TOOL COLOR: Green   CIWA SCORE: N/A  ABNL VS/O2: VSS on 2L NC- Per pt, baseline uses 2L oxygen at night, usually none during the day.   MOBILITY: Ax1 GB/cane  PAIN MANAGMENT: Denies  DIET: NPO since midnight for EGD in AM  BOWEL/BLADDER: Continent  ABNL LAB/BG: Hgb 6.6, one unit RBCs given- recheck 7.3. Next Hgb check 0800.  DRAIN/DEVICES: 2 PIVs. IVF infusing NS at 50 ml/hr. Difficult stick.   TELEMETRY RHYTHM: N/A  SKIN: WDL. +1 edema BLE.   TESTS/PROCEDURES: EGD in AM per GI note  D/C DAY/GOALS/PLACE: Pending GI workup.   OTHER IMPORTANT INFO:

## 2020-10-24 NOTE — PROGRESS NOTES
MD Notification    Notified Person: MD    Notified Person Name:  David     Notification Date/Time: 10/23/2020 2220    Notification Interaction: Page/phone conversation    Purpose of Notification: There's a blood order that states give 2 units of blood (ED order), one unit already infused (started in ED). Recheck Hgb in two hours or give second unit of blood?     Orders Received: Recheck Hgb in 2 hours.     Comments:

## 2020-10-24 NOTE — PROGRESS NOTES
Admission    Patient arrives to room 633-2 via cart from ED.  Care plan note: A&Ox4, VSS on 2L NC. Hgb 6.6, PIV infusing 1 unit RBCs on admission to floor, recheck 0000. Denies pain. Up Ax1 GB/cane. Fall risk, GI bleed, commode at bedside.     Inpatient nursing criteria listed below were met:    PCD's Documented: No  Skin issues/needs documented :Yes  Isolation education started/completed NA  Patient allergies verified with patient: Yes  Verified completion of [place] Risk Assessment Tool:  Yes  Verified completion of Guardianship screening tool: Yes  Fall Prevention: Care plan updated, Education given and documented Yes  Care Plan initiated: Yes  Home medications documented in belongings flowsheet: NA  Patient belongings documented in belongings flowsheet: Yes  Reminder note (belongings/ medications) placed in discharge instructions:Yes  Admission profile/ required documentation complete: Yes  Bedside Report Letter given and explained to patient Yes  Visitor Designated? Yes  If patient is a 72 hour hold/Commitment are belongings removed from room and locked up? NA

## 2020-10-24 NOTE — PHARMACY-ADMISSION MEDICATION HISTORY
Pharmacy Medication History  Admission medication history interview status for the 10/23/2020  admission is complete. See EPIC admission navigator for prior to admission medications       Medication history sources: Patient  Location of interview: Patient Room  Medication history source reliability: Good  Adherence assessment: Good  Medication reconciliation completed by provider prior to medication history? No    Time spent in this activity: 10 minutes      Prior to Admission medications    Medication Sig Last Dose Taking? Auth Provider   atorvastatin (LIPITOR) 10 MG tablet Take 1 tablet (10 mg) by mouth daily 10/23/2020 at AM Yes Tab Grijalva MD   famotidine (PEPCID) 40 MG tablet Take 1 tablet (40 mg) by mouth 2 times daily 10/23/2020 at AM Yes Karson Bishop MD   ferrous sulfate (FE TABS) 325 (65 Fe) MG EC tablet Take 1 tablet (325 mg) by mouth daily 10/23/2020 at AM Yes Karson Bishop MD   gabapentin (NEURONTIN) 300 MG capsule Take 2 capsules (600 mg) by mouth At Bedtime Rx written for 300 mg in the morning and 900 mg at bedtime, but unclear how patient is taking at home (possibly just taking 1 capsule in the evening) 10/22/2020 at HS Yes Giuliano Rodriguez MD   metoprolol succinate ER (TOPROL-XL) 25 MG 24 hr tablet Take 0.5 tablets (12.5 mg) by mouth daily 10/23/2020 at AM Yes Karson Bishop MD   pantoprazole (PROTONIX) 40 MG EC tablet Take 1 tablet (40 mg) by mouth 2 times daily (before meals) 10/23/2020 at AM Yes Karson Bishop MD   tiotropium (SPIRIVA HANDIHALER) 18 MCG inhaled capsule Inhale 1 capsule (18 mcg) into the lungs daily 10/23/2020 at AM Yes Karson Bishop MD   torsemide (DEMADEX) 10 MG tablet Take 10 mg by mouth daily Ordered 9/7/2020 for 30 day supply. Stopped by patient. 10/23/2020 at AM Yes Unknown, Entered By History   albuterol (PROAIR HFA/PROVENTIL HFA/VENTOLIN HFA) 108 (90 Base) MCG/ACT inhaler Inhale 1-2 puffs into the lungs every 6 hours as needed for shortness of  breath / dyspnea or wheezing PRN at PRN  Karson Bishop MD   hydrOXYzine (VISTARIL) 50 MG capsule Take 50 mg by mouth every 6 hours as needed for itching PRN at PRN  Unknown, Entered By History   order for DME Equipment being ordered: Right neoprene knee brace.  ( Fax to [facility] fax  [phone])   Karson Bishop MD   triamcinolone (KENALOG) 0.1 % external cream Apply topically 2 times daily as needed for irritation PRN at PRN  Karson Bishop MD

## 2020-10-24 NOTE — H&P
[facility]    History and Physical - Hospitalist Service       Date of Admission:  10/23/2020    Assessment & Plan   Hermilo Velasquez is a 87 year old male admitted on 10/23/2020. 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  He was recently admitted for GI bleed and follow-up labs showed anemia.  He was directed to come to the emergency department for transfusion and further work-up for GI bleed.      Principal Problem:    Gastrointestinal hemorrhage, unspecified gastrointestinal hemorrhage type    Anemia due to blood loss, acute    Admit as inpatient    Transfuse to maintain hemoglobin greater than or equal to 7 g/dL    Continue proton pump inhibitor    GI consult requested, Dr. Bishop specifically mentions that he requested consultation from Dr. Florentino    Follow serial hemoglobin  Active Problems:    GERD (gastroesophageal reflux disease)    See above, continue PPI    GI consult requested    Paroxysmal atrial fib -- on Eliquis     Eliquis has been suspended due to GI bleed    Hx of Bilateral PE 2016 -- S/P IVC filter    Noted    Non-Hodgkin's lymphoma, unspecified body region, unspecified non-Hodgkin lymphoma type (H)    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    Sees Dr. Bradley    Hyperlipidemia LDL goal <130    Continue statu    Iron deficiency anemia due to chronic blood loss    Continue iron supplements      CKD (chronic kidney disease) stage 3, GFR 30-59 ml/min    Follow urine output, electrolytes and creatinine        Diet: NPO for Medical/Clinical Reasons Except for: No Exceptions    DVT Prophylaxis: Pneumatic Compression Devices  Suazo Catheter: not present  Code Status:   Full         Disposition Plan  "  Expected discharge: 2 days, recommended to prior living arrangement once transfusions complete and GI bleed treated.  Entered: Suha Garcia MD 10/23/2020, 7:03 PM     The patient's care was discussed with the Patient and ED MD.    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    Hermilo went to see his primary doctor on 10/15/2020 complaining of lower extremity edema.  They felt his edema was multifactorial, related to chronic anemia, low albumin and advised elevating the legs, do some labs.  Hemoglobin on 9/28/2020 was 9.3 g/dL and on 10/15/2020 was 6.9 g/dL.  It is unclear why he was not contacted regarding his low hemoglobin but ultimately Dr. Liston called the patient today and advised him to come to the emergency department for transfusion.  Today, hemoglobin is 6.6 g/dL    With regard to GI bleed, he is on PTA famotidine 40 mg BID, FeSO4 3x weekly, pantoprazole 40 mg BI.  Recently hospitalized for weakness and fatigue with hgb 7.4. Has h/o AVM's and gastric/ duodenal angioectasias. Underwent EGD on 8/17 with single nonbleeding angioectasia seen. Also had recent previous EGD and colonoscopy with AVM's noted.    Hermilo complains of a pins-and-needles feeling in his feet, this is chronic.  He feels a bit short of breath with exertion, \"I am always puttering " "around.\"  He denies chest pain.  He is not sure if he has had black stools, \"it is not like I sit there and look\" though he does acknowledge that that he takes iron and this makes his stools black.  He is admitted for further evaluation and treatment of anemia due to GI bleed.    Review of Systems    The 10 point Review of Systems is negative other than noted in the HPI or here.     Past Medical History    I have reviewed this patient's medical history and updated it with pertinent information if needed.   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 Vasques      Aortic stenosis     Severe AS, 9/2015 AVR - [facility] TRIFECTA Bovine bioprosthesis 25MM [id number]     Atrial fibrillation (H)     9/2015 Paroxysmal post op Afib - discharged on Warfarin and a beta blocker     Deep vein thrombosis (H)      GERD (gastroesophageal reflux disease)      Heart murmur      Monoclonal gammopathy     plasmacyte prominent causing monoclonal gammopathy     Need for SBE (subacute bacterial endocarditis) prophylaxis      Neuropathy      Other and unspecified hyperlipidemia      Other malignant lymphomas     non hodgkin's lymphoma     RBBB (right bundle branch block)      Severe sepsis with acute organ dysfunction (H) 11/16/2015     Unspecified hereditary and idiopathic peripheral neuropathy        Past Surgical History   I have reviewed this patient's surgical history and updated it with pertinent information if needed.  Past Surgical History:   Procedure Laterality Date     APPENDECTOMY       AS TOTAL KNEE ARTHROPLASTY       BACK SURGERY       ESOPHAGOSCOPY, GASTROSCOPY, DUODENOSCOPY (EGD), COMBINED N/A 11/28/2015    Procedure: COMBINED ESOPHAGOSCOPY, GASTROSCOPY, DUODENOSCOPY (EGD);  Surgeon: Danis Castillo MD;  Location: [facility]     ESOPHAGOSCOPY, GASTROSCOPY, DUODENOSCOPY (EGD), COMBINED N/A 7/26/2018    Procedure: COMBINED ESOPHAGOSCOPY, GASTROSCOPY, " DUODENOSCOPY (EGD);;  Surgeon: Toby Dong DO;  Location:  GI     ESOPHAGOSCOPY, GASTROSCOPY, DUODENOSCOPY (EGD), COMBINED N/A 2020    Procedure: ESOPHAGOGASTRODUODENOSCOPY (EGD);  Surgeon: Herrera Henry MD;  Location:  GI     HERNIA REPAIR  2006     HERNIORRHAPHY VENTRAL  2013    Procedure: HERNIORRHAPHY VENTRAL;  VENTRAL HERNIA REPAIR WITH MESH;  Surgeon: Patel Guzman MD;  Location:  OR     KNEE SURGERY      arthroscopic right knee surgery      LOBECTOMY LUNG Right 3/26/2019    POSSIBLE LOBECTOMY LUNG per Dr. [facility]ques [facility]     REPAIR LIGAMENT ANKLE  2012    Procedure:REPAIR LIGAMENT ANKLE; LEFT TARSAL TUNNEL RELEASE OF KNOT OF ARIEL RELEASE; Surgeon:SAUL PUENTE; Location: OR     REPLACE VALVE AORTIC N/A 9/3/2015    Procedure: REPLACE VALVE AORTIC;  Surgeon: Antonino Mitchell MD;  Location:  OR     ROTATOR CUFF REPAIR RT/LT      bilateral     SPINE SURGERY      3 spine surgeries     THORACOTOMY, WEDGE RESECTION LUNG, COMBINED Right 3/26/2019    RIGHT THORACOTOMY, WEDGE RESECTION, RIGHT LOWER LOBE LUNG NODULE,;  Surgeon: Jose A Vasquse MD;  Location:  OR     TONSILLECTOMY       TURP         Social History   I have reviewed this patient's social history and updated it with pertinent information if needed.  Social History     Tobacco Use     Smoking status: Former Smoker     Packs/day: 2.00     Years: 55.00     Pack years: 110.00     Quit date: 1998     Years since quittin.7     Smokeless tobacco: Never Used   Substance Use Topics     Alcohol use: Yes     Alcohol/week: 0.0 standard drinks     Comment: 1 drink per day     Drug use: No       Family History   I have reviewed this patient's family history and updated it with pertinent information if needed.  Family History   Problem Relation Age of Onset     C.A.D. Father      Emphysema Father      Melanoma No family hx of      Skin Cancer No family hx of        Prior to  Admission Medications   Prior to Admission Medications   Prescriptions Last Dose Informant Patient Reported? Taking?   albuterol (PROAIR HFA/PROVENTIL HFA/VENTOLIN HFA) 108 (90 Base) MCG/ACT inhaler   No No   Sig: Inhale 1-2 puffs into the lungs every 6 hours as needed for shortness of breath / dyspnea or wheezing   atorvastatin (LIPITOR) 10 MG tablet  Spouse/Significant Other No No   Sig: Take 1 tablet (10 mg) by mouth daily   famotidine (PEPCID) 40 MG tablet   No No   Sig: Take 1 tablet (40 mg) by mouth 2 times daily   ferrous sulfate (FE TABS) 325 (65 Fe) MG EC tablet   No No   Sig: Take 1 tablet (325 mg) by mouth daily   gabapentin (NEURONTIN) 300 MG capsule   No No   Sig: Take 2 capsules (600 mg) by mouth At Bedtime Rx written for 300 mg in the morning and 900 mg at bedtime, but unclear how patient is taking at home (possibly just taking 1 capsule in the evening)   hydrOXYzine (VISTARIL) 50 MG capsule  Spouse/Significant Other Yes No   Sig: Take 50 mg by mouth every 6 hours as needed for itching   metoprolol succinate ER (TOPROL-XL) 25 MG 24 hr tablet   No No   Sig: Take 0.5 tablets (12.5 mg) by mouth daily   order for DME   No No   Sig: Equipment being ordered: Right neoprene knee brace.  ( Fax to [facility] fax  [phone])   pantoprazole (PROTONIX) 40 MG EC tablet   No No   Sig: Take 1 tablet (40 mg) by mouth 2 times daily (before meals)   tiotropium (SPIRIVA HANDIHALER) 18 MCG inhaled capsule   No No   Sig: Inhale 1 capsule (18 mcg) into the lungs daily   torsemide (DEMADEX) 10 MG tablet  Spouse/Significant Other Yes No   Sig: Take 10 mg by mouth daily Ordered 9/7/2020 for 30 day supply. Stopped by patient.   triamcinolone (KENALOG) 0.1 % external cream  Spouse/Significant Other No No   Sig: Apply topically 2 times daily as needed for irritation      Facility-Administered Medications: None     Allergies   Allergies   Allergen Reactions     Lidocaine Blisters     Allergy to lidocaine ointment      "Omeprazole Itching     Pantoprazole Itching     Prevacid [Lansoprazole] Itching     Lasix [Furosemide] Rash     Penicillins Rash     \"broke out from injection\" 60 yrs ago         Physical Exam   Vital Signs: Temp: 97.7  F (36.5  C) Temp src: Oral BP: 109/54 Pulse: 74   Resp: 17 SpO2: 100 % O2 Device: Nasal cannula    Weight: 0 lbs 0 oz    Constitutional: Awake, alert, cooperative, no apparent distress.  Eyes: Conjunctiva and pupils examined and normal.  HEENT: Moist mucous membranes, normal dentition.  Respiratory: Decreased breath sounds throughout.  He has a barrel chest, suggesting emphysema  Cardiovascular: Regular rate and rhythm  GI: Soft, non-distended, non-tender, normal bowel sounds.  Skin: There is profile and, bilateral lower extremity edema.  Musculoskeletal: No joint swelling, erythema or tenderness.  Neurologic: Moves both arms and both legs, exam is nonfocal  Psychiatric: Alert, oriented to person, place and time, no obvious anxiety or depression.  Seems slightly upset at having to be in the hospital.      Data   Data reviewed today: I reviewed all medications, new labs and imaging results over the last 24 hours. I personally reviewed no images or EKG's today.    Recent Labs   Lab 10/23/20  1617   WBC 8.0   HGB 6.6*   MCV 89      INR 1.39*      POTASSIUM 3.9   CHLORIDE 112*   CO2 25   BUN 22   CR 1.25   ANIONGAP 5   AMRITA 7.9*   GLC 95   ALBUMIN 2.6*   PROTTOTAL 5.8*   BILITOTAL 0.1*   ALKPHOS 115   ALT 10   AST 12     No results found for this or any previous visit (from the past 24 hour(s)).  "

## 2020-10-25 LAB
ALBUMIN SERPL-MCNC: 2.6 G/DL (ref 3.4–5)
ALP SERPL-CCNC: 127 U/L (ref 40–150)
ALT SERPL W P-5'-P-CCNC: 11 U/L (ref 0–70)
ANION GAP SERPL CALCULATED.3IONS-SCNC: 4 MMOL/L (ref 3–14)
AST SERPL W P-5'-P-CCNC: 17 U/L (ref 0–45)
BILIRUB SERPL-MCNC: 0.5 MG/DL (ref 0.2–1.3)
BUN SERPL-MCNC: 10 MG/DL (ref 7–30)
CALCIUM SERPL-MCNC: 8.3 MG/DL (ref 8.5–10.1)
CHLORIDE SERPL-SCNC: 111 MMOL/L (ref 94–109)
CO2 SERPL-SCNC: 25 MMOL/L (ref 20–32)
COLONOSCOPY: NORMAL
CREAT SERPL-MCNC: 1.09 MG/DL (ref 0.66–1.25)
ERYTHROCYTE [DISTWIDTH] IN BLOOD BY AUTOMATED COUNT: 14.9 % (ref 10–15)
GFR SERPL CREATININE-BSD FRML MDRD: 60 ML/MIN/{1.73_M2}
GLUCOSE SERPL-MCNC: 87 MG/DL (ref 70–99)
HCT VFR BLD AUTO: 31.6 % (ref 40–53)
HGB BLD-MCNC: 7.5 G/DL (ref 13.3–17.7)
HGB BLD-MCNC: 9.4 G/DL (ref 13.3–17.7)
MCH RBC QN AUTO: 26.9 PG (ref 26.5–33)
MCHC RBC AUTO-ENTMCNC: 29.7 G/DL (ref 31.5–36.5)
MCV RBC AUTO: 91 FL (ref 78–100)
PLATELET # BLD AUTO: 268 10E9/L (ref 150–450)
POTASSIUM SERPL-SCNC: 4.3 MMOL/L (ref 3.4–5.3)
PROT SERPL-MCNC: 6 G/DL (ref 6.8–8.8)
RBC # BLD AUTO: 3.49 10E12/L (ref 4.4–5.9)
SODIUM SERPL-SCNC: 140 MMOL/L (ref 133–144)
WBC # BLD AUTO: 7 10E9/L (ref 4–11)

## 2020-10-25 PROCEDURE — 80053 COMPREHEN METABOLIC PANEL: CPT | Performed by: HOSPITALIST

## 2020-10-25 PROCEDURE — G0500 MOD SEDAT ENDO SERVICE >5YRS: HCPCS | Performed by: INTERNAL MEDICINE

## 2020-10-25 PROCEDURE — 250N000013 HC RX MED GY IP 250 OP 250 PS 637: Performed by: INTERNAL MEDICINE

## 2020-10-25 PROCEDURE — C9113 INJ PANTOPRAZOLE SODIUM, VIA: HCPCS | Performed by: INTERNAL MEDICINE

## 2020-10-25 PROCEDURE — 250N000011 HC RX IP 250 OP 636: Performed by: INTERNAL MEDICINE

## 2020-10-25 PROCEDURE — 99153 MOD SED SAME PHYS/QHP EA: CPT | Performed by: INTERNAL MEDICINE

## 2020-10-25 PROCEDURE — 99232 SBSQ HOSP IP/OBS MODERATE 35: CPT | Performed by: HOSPITALIST

## 2020-10-25 PROCEDURE — 36415 COLL VENOUS BLD VENIPUNCTURE: CPT | Performed by: HOSPITALIST

## 2020-10-25 PROCEDURE — 120N000001 HC R&B MED SURG/OB

## 2020-10-25 PROCEDURE — 85027 COMPLETE CBC AUTOMATED: CPT | Performed by: HOSPITALIST

## 2020-10-25 PROCEDURE — 85018 HEMOGLOBIN: CPT | Performed by: HOSPITALIST

## 2020-10-25 PROCEDURE — 0W3P8ZZ CONTROL BLEEDING IN GASTROINTESTINAL TRACT, VIA NATURAL OR ARTIFICIAL OPENING ENDOSCOPIC: ICD-10-PCS | Performed by: INTERNAL MEDICINE

## 2020-10-25 PROCEDURE — 45388 COLONOSCOPY W/ABLATION: CPT | Performed by: INTERNAL MEDICINE

## 2020-10-25 RX ORDER — DEXAMETHASONE SODIUM PHOSPHATE 4 MG/ML
4 INJECTION, SOLUTION INTRA-ARTICULAR; INTRALESIONAL; INTRAMUSCULAR; INTRAVENOUS; SOFT TISSUE
Status: DISCONTINUED | OUTPATIENT
Start: 2020-10-25 | End: 2020-10-25 | Stop reason: HOSPADM

## 2020-10-25 RX ORDER — FENTANYL CITRATE 50 UG/ML
INJECTION, SOLUTION INTRAMUSCULAR; INTRAVENOUS PRN
Status: DISCONTINUED | OUTPATIENT
Start: 2020-10-25 | End: 2020-10-25 | Stop reason: HOSPADM

## 2020-10-25 RX ORDER — NALOXONE HYDROCHLORIDE 0.4 MG/ML
.1-.4 INJECTION, SOLUTION INTRAMUSCULAR; INTRAVENOUS; SUBCUTANEOUS
Status: ACTIVE | OUTPATIENT
Start: 2020-10-25 | End: 2020-10-26

## 2020-10-25 RX ORDER — FENTANYL CITRATE 50 UG/ML
25-50 INJECTION, SOLUTION INTRAMUSCULAR; INTRAVENOUS
Status: DISCONTINUED | OUTPATIENT
Start: 2020-10-25 | End: 2020-10-25 | Stop reason: HOSPADM

## 2020-10-25 RX ORDER — SODIUM CHLORIDE, SODIUM LACTATE, POTASSIUM CHLORIDE, CALCIUM CHLORIDE 600; 310; 30; 20 MG/100ML; MG/100ML; MG/100ML; MG/100ML
INJECTION, SOLUTION INTRAVENOUS CONTINUOUS
Status: DISCONTINUED | OUTPATIENT
Start: 2020-10-25 | End: 2020-10-25 | Stop reason: HOSPADM

## 2020-10-25 RX ORDER — ONDANSETRON 2 MG/ML
4 INJECTION INTRAMUSCULAR; INTRAVENOUS EVERY 30 MIN PRN
Status: DISCONTINUED | OUTPATIENT
Start: 2020-10-25 | End: 2020-10-25 | Stop reason: HOSPADM

## 2020-10-25 RX ORDER — HYDROMORPHONE HYDROCHLORIDE 1 MG/ML
.3-.5 INJECTION, SOLUTION INTRAMUSCULAR; INTRAVENOUS; SUBCUTANEOUS EVERY 5 MIN PRN
Status: DISCONTINUED | OUTPATIENT
Start: 2020-10-25 | End: 2020-10-25 | Stop reason: HOSPADM

## 2020-10-25 RX ORDER — ONDANSETRON 4 MG/1
4 TABLET, ORALLY DISINTEGRATING ORAL EVERY 30 MIN PRN
Status: DISCONTINUED | OUTPATIENT
Start: 2020-10-25 | End: 2020-10-25 | Stop reason: HOSPADM

## 2020-10-25 RX ORDER — FLUMAZENIL 0.1 MG/ML
0.2 INJECTION, SOLUTION INTRAVENOUS
Status: ACTIVE | OUTPATIENT
Start: 2020-10-25 | End: 2020-10-26

## 2020-10-25 RX ADMIN — ATORVASTATIN CALCIUM 10 MG: 10 TABLET, FILM COATED ORAL at 08:34

## 2020-10-25 RX ADMIN — PANTOPRAZOLE SODIUM 40 MG: 40 INJECTION, POWDER, FOR SOLUTION INTRAVENOUS at 21:00

## 2020-10-25 RX ADMIN — GABAPENTIN 600 MG: 300 CAPSULE ORAL at 21:10

## 2020-10-25 RX ADMIN — FERROUS SULFATE TAB 325 MG (65 MG ELEMENTAL FE) 325 MG: 325 (65 FE) TAB at 08:34

## 2020-10-25 RX ADMIN — PANTOPRAZOLE SODIUM 40 MG: 40 INJECTION, POWDER, FOR SOLUTION INTRAVENOUS at 08:34

## 2020-10-25 RX ADMIN — UMECLIDINIUM 1 PUFF: 62.5 AEROSOL, POWDER ORAL at 08:34

## 2020-10-25 RX ADMIN — HYDROXYZINE PAMOATE 50 MG: 25 CAPSULE ORAL at 17:48

## 2020-10-25 ASSESSMENT — ACTIVITIES OF DAILY LIVING (ADL)
ADLS_ACUITY_SCORE: 14

## 2020-10-25 NOTE — PLAN OF CARE
DATE & TIME: 10/25/2020 7a-3p  Cognitive Concerns/ Orientation : A&Ox4  BEHAVIOR & AGGRESSION TOOL COLOR: Green   CIWA SCORE: N/A  ABNL VS/O2: VSS on 2-3L NC not baseline while awake, wears 2L at bedtime  MOBILITY: Ax1 GB/cane to commode   PAIN MANAGMENT: Denies  DIET: okayed for mech soft diet- tolerated lunch- no nausea.  BOWEL/BLADDER:continent, stool was watery black prior to colonoscopy  ABNL LAB/BG: Hgb 7.4 improved to 9.4  DRAIN/DEVICES: right arm PIV went bad, left hand PIV is working and is SL. IVF now completed.  TELEMETRY RHYTHM: N/A  SKIN: WDL-BLLE edema +1-2  TESTS/PROCEDURES: Colonoscopy today with multiple interventions, see GI note. Pt okayed for mech soft diet.  D/C DAY/GOALS/PLACE: Pending stable hgb and improved oxygen needs.  OTHER IMPORTANT INFO: LS wheezy on expiration at times.

## 2020-10-25 NOTE — PROGRESS NOTES
[facility]  Gastroenterology Progress Note     Hermilo Velasquez MRN# [id number]   YOB: 1932 Age: 87 year old          Assessment and Plan:     Gastrointestinal hemorrhage, unspecified gastrointestinal hemorrhage type    GERD (gastroesophageal reflux disease)    Anemia due to blood loss, acute    Paroxysmal atrial fib -- on Eliquis     Hx of Bilateral PE 2016 -- S/P IVC filter    Non-Hodgkin's lymphoma, unspecified body region, unspecified non-Hodgkin lymphoma type (H)    Hyperlipidemia LDL goal <130    Iron deficiency anemia due to chronic blood loss    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    Gastric AVM's -- on EGD 8/17/20    CKD (chronic kidney disease) stage 3, GFR 30-59 ml/min    Anemia, unspecified type  Patient did well no new GI complaint patient's hemoglobin has improved after transfusion.  Plan for upper GI endoscopy this morning.  EGD was completely unremarkable no signs of AVM or GI blood loss noticed at the time of endoscopy.  Plan to proceed with colonoscopy tomorrow.            Gastrointestinal hemorrhage, unspecified gastrointestinal hemorrhage type  Anemia, unspecified type      Interval History:   doing well; no cp, sob, n/v/d, or abd pain.              Review of Systems:   C: NEGATIVE for fever, chills, change in weight  E/M: NEGATIVE for ear, mouth and throat problems  R: NEGATIVE for significant cough or SOB  CV: NEGATIVE for chest pain, palpitations or peripheral edema             Medications:   I have reviewed this patient's current medications    atorvastatin  10 mg Oral Daily     ferrous sulfate  325 mg Oral Daily     gabapentin  600 mg Oral At Bedtime     pantoprazole (PROTONIX) IV  40 mg Intravenous Q12H     sodium chloride (PF)  3 mL Intracatheter Q8H     umeclidinium  1 puff Inhalation Daily                  Physical Exam:   Vitals were reviewed  Vital Signs with Ranges  Temp:  [97.2  F (36.2  C)-98.2  F (36.8  C)] 97.2  F (36.2   C)  Pulse:  [54-77] 68  Resp:  [13-35] 16  BP: ()/(48-61) 109/54  SpO2:  [88 %-100 %] 94 %  I/O last 3 completed shifts:  In: 620 [P.O.:120]  Out: 425 [Urine:425]  Constitutional: healthy, alert and no distress   Cardiovascular: negative, PMI normal. No lifts, heaves, or thrills. RRR. No murmurs, clicks gallops or rub  Respiratory: negative, Percussion normal. Good diaphragmatic excursion. Lungs clear  Neck: Neck supple. No adenopathy. Thyroid symmetric, normal size,, Carotids without bruits.  Abdomen: Abdomen soft, non-tender. BS normal. No masses, organomegaly  SKIN: no suspicious lesions or rashes             Data:   I reviewed the patient's new clinical lab test results.   Recent Labs   Lab Test 10/24/20  0823 10/24/20  0037 10/23/20  1617 09/28/20  1201 09/28/20  1201 09/15/20  0735 09/15/20  0735 08/17/20  0948 08/17/20  0948 08/16/20  0829   WBC  --   --  8.0  --  10.5  --  21.2*   < > 8.9 9.5   HGB 7.4* 7.3* 6.6*   < > 9.3*   < > 7.4*   < > 7.7* 7.5*   MCV  --   --  89  --  92  --  92   < > 82 81   PLT  --   --  279  --  176  --  164   < > 273 272   INR  --   --  1.39*  --   --   --   --   --  1.21* 1.30*    < > = values in this interval not displayed.     Recent Labs   Lab Test 10/24/20  0823 10/23/20  1617 09/15/20  0735   POTASSIUM 4.2 3.9 4.3   CHLORIDE 113* 112* 117*   CO2 25 25 25   BUN 16 22 35*   ANIONGAP 3 5 2*     Recent Labs   Lab Test 10/23/20  1617 09/13/20  1256 08/16/20  0829 08/16/20  0400 03/22/20  1950 03/22/20  1950 03/22/20  1914 12/22/19  1614   ALBUMIN 2.6* 2.6* 2.3*  --    < >  --  2.6*  --    BILITOTAL 0.1* 0.7 0.5  --    < >  --  0.6  --    ALT 10 9 8  --    < >  --  27  --    AST 12 16 7  --    < >  --  101*  --    PROTEIN  --   --   --  Negative  --  10*  --  10*   LIPASE  --   --   --   --   --   --  54*  --     < > = values in this interval not displayed.       I reviewed the patient's new imaging results.    All laboratory data reviewed  All imaging studies reviewed by  me.    Vick Florentino MD,  10/24/2020  [facility]  Office : [phone]  Cell: [phone]

## 2020-10-25 NOTE — PROGRESS NOTES
[facility]  Hospitalist Progress Note   10/25/2020          Assessment and Plan:       Hermilo Velasquez is a 87 year old male admitted on 10/23/2020 with abnormal labs.    GI bleed.  Gastric AVM's -- on EGD 8/17/20   Acute on chronic iron deficiency anemia.  Patient had been to his PCP on 10/15 with lower extremity edema and hemoglobin was 6.9.  Unclear why he was not contacted regarding his low hemoglobin, recommended by his provider to come into the ED 10/23 with hemoglobin of 6.6.  1 unit PRBC transfusion with which hemoglobin improved to 7.3.  PTA famotidine on hold, started on pantoprazole IV.  To continue.   GI following, EGD unremarkable no signs of AVM or GI blood loss noticed at the time of endoscopy.  Plan for colonoscopy today.  Appreciate recommendation.  Monitor hemoglobin levels every 12 hours   We will transfuse if hemoglobin less than 7 or hemodynamically unstable.    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 Lifelong anticoagulation recommended.   Discussed with Dr. Florentino, hold eliquis atleast for a week, would need to revisit long-term anticoagulation.    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  Continue home oxygen.    CKD stage III.  Creatinine at around baseline, monitor.     CAD  Continue PTA Lipitor.  PTA metoprolol on hold given GI bleed.  Hold PTA Demadex.     Paroxysmal atrial fibrillation on Eliquis   Hold PTA metoprolol and hold Eliquis     Hyperlipidemia:   Continue PTA statin     Chronic pruritus :  PTA Atarax       Bullous pemphigoid  stable.     Physical deconditioning.  Assist of 1 with cane to commode.  PT evaluation prior to discharge disposition.        Orders Placed This Encounter      NPO for Medical/Clinical Reasons Except for: Meds      DVT Prophylaxis: SCD.   Ambulate.  Code Status: Full Code  Disposition: Expected discharge  likely tomorrow pending clinical improvement.    Discussed with patient, bedside RN, gastroenterology Dr. Chadd Bates MD        Interval History:      Sitting up in bed.  No further episodes of bleeding.  Has been having colonoscopy prep, dark-colored stools per RN report.  Has been n.p.o. from midnight.  Denies any chest pain or shortness of breath.  Complains of pain in the pelvic area.         Physical Exam:        Physical Exam   Temp:  [97.2  F (36.2  C)-98.5  F (36.9  C)] 98.5  F (36.9  C)  Pulse:  [54-89] 80  Resp:  [13-35] 18  BP: ()/(52-61) 107/53  SpO2:  [88 %-100 %] 95 %    Intake/Output Summary (Last 24 hours) at 10/25/2020 0821  Last data filed at 10/25/2020 0800  Gross per 24 hour   Intake 466 ml   Output 1175 ml   Net -709 ml     PHYSICAL EXAM  GENERAL: Patient is in no distress. Alert and oriented.  HEART: Regular rate and rhythm. S1S2. No murmurs  LUNGS: Respirations unlabored  NEURO: Moving all extremities, no focal weakness.  Abdomen soft nontender bowel sounds heard, suprapubic tenderness present.  EXTREMITIES: No pedal edema  SKIN: Warm, dry. No rash or bruising.  PSYCHIATRY Cooperative       Medications:          atorvastatin  10 mg Oral Daily     ferrous sulfate  325 mg Oral Daily     gabapentin  600 mg Oral At Bedtime     pantoprazole (PROTONIX) IV  40 mg Intravenous Q12H     sodium chloride (PF)  3 mL Intracatheter Q8H     umeclidinium  1 puff Inhalation Daily     acetaminophen, acetaminophen, albuterol, hydrOXYzine, lidocaine 4%, lidocaine (buffered or not buffered), melatonin, naloxone, ondansetron **OR** ondansetron, sodium chloride (PF)         Data:      All new lab and imaging data was reviewed.

## 2020-10-25 NOTE — PLAN OF CARE
DATE & TIME: 10.24.20 PM    Cognitive Concerns/ Orientation : AOx4   BEHAVIOR & AGGRESSION TOOL COLOR: Green  CIWA SCORE: NA   ABNL VS/O2: VSS on 2L- (2L baseline at night)  MOBILITY: Ax1 w/ BG and cane  PAIN MANAGMENT: Denied any pain this marlin  DIET: Clears. NPO at midnight.   BOWEL/BLADDER: Continent of B/B- BSC   ABNL LAB/BG: NA  DRAIN/DEVICES: RAC PIV infusing and L hand PIV SL  TELEMETRY RHYTHM: NA  SKIN: WDL  TESTS/PROCEDURES: Colonoscopy tomorrow  D/C DAY/GOALS/PLACE: Plan pending pt progress  OTHER IMPORTANT INFO: NA

## 2020-10-25 NOTE — PLAN OF CARE
DATE & TIME: 10/24/2020 2659-3453  Cognitive Concerns/ Orientation : A&Ox4  BEHAVIOR & AGGRESSION TOOL COLOR: Green   CIWA SCORE: N/A  ABNL VS/O2: VSS on 2-3L NC  MOBILITY: Ax1 GB/cane  PAIN MANAGMENT: Denies  DIET: NPO at midnight for colonoscopy today  BOWEL/BLADDER: Incontinent at times, no BM this shift, previous shift reported multiple loose watery stools with some brown   ABNL LAB/BG: Hgb 7.4  DRAIN/DEVICES: 2 PIVs. IVF infusing NS at 50 ml/hr.  TELEMETRY RHYTHM: N/A  SKIN: WDL  TESTS/PROCEDURES: Colonoscopy today  D/C DAY/GOALS/PLACE: Pending GI workup.   OTHER IMPORTANT INFO:

## 2020-10-26 ENCOUNTER — APPOINTMENT (OUTPATIENT)
Dept: PHYSICAL THERAPY | Facility: CLINIC | Age: 85
DRG: 378 | End: 2020-10-26
Attending: HOSPITALIST
Payer: COMMERCIAL

## 2020-10-26 VITALS
TEMPERATURE: 98.1 F | HEIGHT: 70 IN | HEART RATE: 90 BPM | BODY MASS INDEX: 24.82 KG/M2 | OXYGEN SATURATION: 93 % | RESPIRATION RATE: 18 BRPM | DIASTOLIC BLOOD PRESSURE: 53 MMHG | SYSTOLIC BLOOD PRESSURE: 105 MMHG

## 2020-10-26 LAB
ERYTHROCYTE [DISTWIDTH] IN BLOOD BY AUTOMATED COUNT: 14.9 % (ref 10–15)
HCT VFR BLD AUTO: 25.1 % (ref 40–53)
HGB BLD-MCNC: 7.5 G/DL (ref 13.3–17.7)
HGB BLD-MCNC: 8.7 G/DL (ref 13.3–17.7)
MCH RBC QN AUTO: 26.8 PG (ref 26.5–33)
MCHC RBC AUTO-ENTMCNC: 29.9 G/DL (ref 31.5–36.5)
MCV RBC AUTO: 90 FL (ref 78–100)
PLATELET # BLD AUTO: 228 10E9/L (ref 150–450)
RBC # BLD AUTO: 2.8 10E12/L (ref 4.4–5.9)
WBC # BLD AUTO: 9.4 10E9/L (ref 4–11)

## 2020-10-26 PROCEDURE — 97110 THERAPEUTIC EXERCISES: CPT | Mod: GP | Performed by: PHYSICAL THERAPIST

## 2020-10-26 PROCEDURE — 97161 PT EVAL LOW COMPLEX 20 MIN: CPT | Mod: GP | Performed by: PHYSICAL THERAPIST

## 2020-10-26 PROCEDURE — C9113 INJ PANTOPRAZOLE SODIUM, VIA: HCPCS | Performed by: INTERNAL MEDICINE

## 2020-10-26 PROCEDURE — 97530 THERAPEUTIC ACTIVITIES: CPT | Mod: GP | Performed by: PHYSICAL THERAPIST

## 2020-10-26 PROCEDURE — 85027 COMPLETE CBC AUTOMATED: CPT | Performed by: INTERNAL MEDICINE

## 2020-10-26 PROCEDURE — 85018 HEMOGLOBIN: CPT | Performed by: HOSPITALIST

## 2020-10-26 PROCEDURE — 99239 HOSP IP/OBS DSCHRG MGMT >30: CPT | Performed by: HOSPITALIST

## 2020-10-26 PROCEDURE — 250N000011 HC RX IP 250 OP 636: Performed by: INTERNAL MEDICINE

## 2020-10-26 PROCEDURE — 36415 COLL VENOUS BLD VENIPUNCTURE: CPT | Performed by: INTERNAL MEDICINE

## 2020-10-26 PROCEDURE — 36415 COLL VENOUS BLD VENIPUNCTURE: CPT | Performed by: HOSPITALIST

## 2020-10-26 PROCEDURE — 250N000013 HC RX MED GY IP 250 OP 250 PS 637: Performed by: INTERNAL MEDICINE

## 2020-10-26 PROCEDURE — 97116 GAIT TRAINING THERAPY: CPT | Mod: GP | Performed by: PHYSICAL THERAPIST

## 2020-10-26 RX ADMIN — UMECLIDINIUM 1 PUFF: 62.5 AEROSOL, POWDER ORAL at 08:43

## 2020-10-26 RX ADMIN — PANTOPRAZOLE SODIUM 40 MG: 40 INJECTION, POWDER, FOR SOLUTION INTRAVENOUS at 08:42

## 2020-10-26 RX ADMIN — FERROUS SULFATE TAB 325 MG (65 MG ELEMENTAL FE) 325 MG: 325 (65 FE) TAB at 08:42

## 2020-10-26 RX ADMIN — ATORVASTATIN CALCIUM 10 MG: 10 TABLET, FILM COATED ORAL at 08:42

## 2020-10-26 ASSESSMENT — ACTIVITIES OF DAILY LIVING (ADL)
ADLS_ACUITY_SCORE: 14
ADLS_ACUITY_SCORE: 15
ADLS_ACUITY_SCORE: 15
ADLS_ACUITY_SCORE: 14
ADLS_ACUITY_SCORE: 14

## 2020-10-26 NOTE — PLAN OF CARE
Discharge    Patient discharged to Home via w/c with spouse  Care plan note  DATE & TIME: 10/26/2020 4916-9249  Cognitive Concerns/ Orientation : A&Ox4  BEHAVIOR & AGGRESSION TOOL COLOR: Green   CIWA SCORE: N/A  ABNL VS/O2: VSS on 3L O2, sating 93%. Baseline 2-3L at night and using O2 as needed during days. .   MOBILITY: Ax1 GB/cane to commode   PAIN MANAGMENT: Denies  DIET: Mech soft diet. Tolerating well.   BOWEL/BLADDER: Continent. No BM since colonoscopy.   ABNL LAB/BG: Hgb 8.7 (7.5 this AM) no signs of active bleeding  DRAIN/DEVICES: L hand PIV removed  TELEMETRY RHYTHM: N/A  SKIN: WDL-BLLE trace edema   TESTS/PROCEDURES: Colonoscopy 10/26 with multiple interventions, see GI note. Pt okayed for mech soft diet.  D/C DAY/GOALS/PLACE: Discharged home today 10/26/20 at 1650   OTHER IMPORTANT INFO: LS wheezy on expiration at times. BLE neuropathy, baseline. PT recommended discharge home. Went through AVS with the pt, verbalized understanding. No new medications ordered. Per GI order, instructed to hold Eliquis for 1 week until follow up with PCP/GI. Resuming all other PTA meds, clarified with MD prior to discharge. Belongings packed and sent with the pt.    Listed belongings gathered and returned to patient. Yes  Care Plan and Patient education resolved: Yes  Prescriptions if needed, hard copies sent with patient  NA  Home and hospital acquired medications returned to patient: Yes  Medication Bin checked and emptied on discharge Yes  Follow up appointment made for patient: Yes

## 2020-10-26 NOTE — PROGRESS NOTES
10/26/20 1100   Quick Adds   Type of Visit Initial PT Evaluation   Living Environment   People in home spouse   Current Living Arrangements house   Home Accessibility stairs to enter home   Number of Stairs, Main Entrance 2   Stair Railings, Main Entrance railings on both sides of stairs   Transportation Anticipated car, drives self;family or friend will provide   Self-Care   Usual Activity Tolerance good   Current Activity Tolerance good   Regular Exercise Yes   Activity/Exercise Type strength training;walking   Exercise Amount/Frequency 3-5 times/wk   Equipment Currently Used at Home cane, straight   Disability/Function   Hearing Difficulty or Deaf yes   Patient's preferred means of communication verbal   Describe hearing loss bilateral hearing loss   Use of hearing assistive devices none   Walking or Climbing Stairs Difficulty yes   Walking or Climbing Stairs ambulation difficulty, requires equipment;stair climbing difficulty, requires equipment;transferring difficulty, requires equipment   Mobility Management use of SEC for mob, SOB with increased activity on RA with mask   Fall history within last six months no   General Information   Onset of Illness/Injury or Date of Surgery 10/25/20   Referring Physician Cecil Bates   Patient/Family Therapy Goals Statement (PT) Pt plans on dc to home   Pertinent History of Current Problem (include personal factors and/or comorbidities that impact the POC) Hermilo Olson is a pleasant 87 year old male with lower GI bleed. 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 Cauterized. Patient had previously been on Eliquis- currently held.   Existing Precautions/Restrictions oxygen therapy device and L/min  (on 3 LPM, pt states mostly uses at night)   Weight-Bearing Status - LUE full weight-bearing   Weight-Bearing Status - RUE full weight-bearing   Weight-Bearing Status - LLE full weight-bearing   Weight-Bearing Status - RLE full weight-bearing   Heart Disease Risk  Factors Family history   Cognition   Orientation Status (Cognition) oriented x 3   Affect/Mental Status (Cognition) WFL   Follows Commands (Cognition) WFL  (rep for [place])   Pain Assessment   Patient Currently in Pain No   Range of Motion (ROM)   ROM Comment WFL's   Strength   Strength Comments able to lift antigravity, sit to stand with SBA, functionally appears to be at baseline   Bed Mobility   Comment (Bed Mobility) bed mob with SBA    Transfers   Transfer Safety Comments Sit to stand with cues for hand placement for safety, SBA with SEC   Gait/Stairs (Locomotion)   [place] Level (Gait) supervision   Assistive Device (Gait) cane, straight   Distance in Feet (Required for LE Total Joints) 400'   Pattern (Gait) swing-through   Negotiation (Stairs) other (see comments)  (declined)   Comment (Gait/Stairs) declined   Balance   Balance Comments good with SEC, able to complete head turns and change of direction   Clinical Impression   Criteria for Skilled Therapeutic Intervention yes, treatment indicated   PT Diagnosis (PT) decreased act yoli   Influenced by the following impairments SOB, blood loss anemia   Functional limitations due to impairments impaired functional mob yoli and safety   Clinical Presentation Stable/Uncomplicated   Clinical Presentation Rationale clinical judgement   Clinical Decision Making (Complexity) low complexity   Therapy Frequency (PT) One time eval and treatment only   Planned Therapy Interventions (PT) gait training;home exercise program;transfer training   Risk & Benefits of therapy have been explained evaluation/treatment results reviewed;care plan/treatment goals reviewed;risks/benefits reviewed;patient   PT Discharge Planning    PT Discharge Recommendation (DC Rec) home   PT Rationale for DC Rec Pt appears to be at or near baseline for funcitonal mob I and safety, some decreased yoli with SOB on RA and with mask, but pt does use O2 at home   PT Brief overview of current status  Pt mod  "I with bed mob, transfers and amb with SEC   [facility] AM-PAC TM \"6 Clicks\"   2016, Trustees of [facility], under license to [employer].  All rights reserved.   6 Clicks Short Forms Basic Mobility Inpatient Short Form   [facility] AM-PAC  \"6 Clicks\" V.2 Basic Mobility Inpatient Short Form   1. Turning from your back to your side while in a flat bed without using bedrails? 4 - None   2. Moving from lying on your back to sitting on the side of a flat bed without using bedrails? 4 - None   3. Moving to and from a bed to a chair (including a wheelchair)? 4 - None   4. Standing up from a chair using your arms (e.g., wheelchair, or bedside chair)? 4 - None   5. To walk in hospital room? 4 - None   6. Climbing 3-5 steps with a railing? 4 - None   Basic Mobility Raw Score (Score out of 24.Lower scores equate to lower levels of function) 24   Total Evaluation Time   Total Evaluation Time (Minutes) 10     "

## 2020-10-26 NOTE — PLAN OF CARE
Physical Therapy Discharge Summary    Reason for therapy discharge:    All goals and outcomes met, no further needs identified.    Progress towards therapy goal(s). See goals on Care Plan in [facility] electronic health record for goal details.  Goals met    Therapy recommendation(s):    Continue home exercise program. Rec cont amb with nursing supervision in halls while hospitalized

## 2020-10-26 NOTE — PROGRESS NOTES
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  Colonoscopy findings consistent with multiple AVMs in the cecum with 1 AVM actively oozing.  Hemostasis was achieved with argon plasma coagulation.  I will recommend to hold Eliquis for 1 week.  I will recommend further evaluation with video capsule endoscopy as an outpatient.  Restart full liquid diet and advance as tolerated.              Gastrointestinal hemorrhage, unspecified gastrointestinal hemorrhage type  Anemia, unspecified type      Interval History:   doing well; no cp, sob, n/v/d, or abd pain.              Review of Systems:   C: NEGATIVE for fever, chills, change in weight  E/M: NEGATIVE for ear, mouth and throat problems  R: NEGATIVE for significant cough or SOB  CV: NEGATIVE for chest pain, palpitations or peripheral edema             Medications:   I have reviewed this patient's current medications    atorvastatin  10 mg Oral Daily     ferrous sulfate  325 mg Oral Daily     gabapentin  600 mg Oral At Bedtime     pantoprazole (PROTONIX) IV  40 mg Intravenous Q12H     sodium chloride (PF)  3 mL  Intracatheter Q8H     umeclidinium  1 puff Inhalation Daily                  Physical Exam:   Vitals were reviewed  Vital Signs with Ranges  Temp:  [97.6  F (36.4  C)-98.5  F (36.9  C)] 98  F (36.7  C)  Pulse:  [58-89] 58  Resp:  [12-37] 18  BP: (107-132)/(46-77) 114/57  SpO2:  [84 %-100 %] 98 %  I/O last 3 completed shifts:  In: 346 [I.V.:346]  Out: 1175 [Urine:1175]  Constitutional: healthy, alert and no distress   Cardiovascular: negative, PMI normal. No lifts, heaves, or thrills. RRR. No murmurs, clicks gallops or rub  Respiratory: negative, Percussion normal. Good diaphragmatic excursion. Lungs clear  Neck: Neck supple. No adenopathy. Thyroid symmetric, normal size,, Carotids without bruits.  Abdomen: Abdomen soft, non-tender. BS normal. No masses, organomegaly  NEURO: Gait normal. Reflexes normal and symmetric. Sensation grossly WNL.           Data:   I reviewed the patient's new clinical lab test results.   Recent Labs   Lab Test 10/25/20  1145 10/24/20  0823 10/24/20  0037 10/23/20  1617 09/28/20  1201 09/28/20  1201 08/17/20  0948 08/17/20  0948 08/16/20  0829   WBC 7.0  --   --  8.0  --  10.5   < > 8.9 9.5   HGB 9.4* 7.4* 7.3* 6.6*   < > 9.3*   < > 7.7* 7.5*   MCV 91  --   --  89  --  92   < > 82 81     --   --  279  --  176   < > 273 272   INR  --   --   --  1.39*  --   --   --  1.21* 1.30*    < > = values in this interval not displayed.     Recent Labs   Lab Test 10/25/20  1145 10/24/20  0823 10/23/20  1617   POTASSIUM 4.3 4.2 3.9   CHLORIDE 111* 113* 112*   CO2 25 25 25   BUN 10 16 22   ANIONGAP 4 3 5     Recent Labs   Lab Test 10/25/20  1145 10/23/20  1617 09/13/20  1256 08/16/20  0400 08/16/20  0400 03/22/20  1950 03/22/20  1950 03/22/20  1914 12/22/19  1614   ALBUMIN 2.6* 2.6* 2.6*   < >  --    < >  --  2.6*  --    BILITOTAL 0.5 0.1* 0.7   < >  --    < >  --  0.6  --    ALT 11 10 9   < >  --    < >  --  27  --    AST 17 12 16   < >  --    < >  --  101*  --    PROTEIN  --   --   --   --   Negative  --  10*  --  10*   LIPASE  --   --   --   --   --   --   --  54*  --     < > = values in this interval not displayed.       I reviewed the patient's new imaging results.    All laboratory data reviewed  All imaging studies reviewed by me.    Vick Florentino MD,  10/25/2020  [facility]  Office : [phone]  Cell: [phone]

## 2020-10-26 NOTE — PLAN OF CARE
DATE & TIME: 10/25/2020 1847-6803  Cognitive Concerns/ Orientation : A&Ox4  BEHAVIOR & AGGRESSION TOOL COLOR: Green   CIWA SCORE: N/A  ABNL VS/O2: VSS on 3L o2. Baseline for sleeping at home.   MOBILITY: Ax1 GB/cane to commode   PAIN MANAGMENT: Denies  DIET: [facility] soft diet. Tolerating well.   BOWEL/BLADDER: Continent. No BM since colonoscopy.   ABNL LAB/BG: Hgb checks q12h. Last checked 2345, 7.4. 2.0 drop in 12 hours. Scheduled AM draw for 0600 to monitor at an earlier time. 0530 Hgb also 7.5.   DRAIN/DEVICES: L hand PIV SL  TELEMETRY RHYTHM: N/A  SKIN: WDL-BLLE edema +1-2  TESTS/PROCEDURES: Colonoscopy 10/26 with multiple interventions, see GI note. Pt okayed for [facility] soft diet.  D/C DAY/GOALS/PLACE: Pending stable hgb and improved oxygen needs.  OTHER IMPORTANT INFO: LS wheezy on expiration at times. BLE neuropathy, baseline.

## 2020-10-26 NOTE — DISCHARGE SUMMARY
Discharge Summary  Hospitalist    Date of Admission:  10/23/2020  Date of Discharge:  10/26/2020  Discharging Provider: Cat Bates MD    Primary Care Physician   Karson Bishop  Primary Care Provider Phone Number: [phone]  Primary Care Provider Fax Number: [phone]    PRINCIPAL DIAGNOSIS  Acute on chronic GI bleed.  Acute on chronic iron deficiency anemia.    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 Vasques      Aortic stenosis     Severe AS, 9/2015 AVR - [facility] TRIFECTA Bovine bioprosthesis 25MM TF-[id number]     Atrial fibrillation (H)     9/2015 Paroxysmal post op Afib - discharged on Warfarin and a beta blocker     COPD (chronic obstructive pulmonary disease) (H)      Deep vein thrombosis (H)      GERD (gastroesophageal reflux disease)      Heart murmur      Monoclonal gammopathy     plasmacyte prominent causing monoclonal gammopathy     Need for SBE (subacute bacterial endocarditis) prophylaxis      Neuropathy      Other and unspecified hyperlipidemia      Other malignant lymphomas     non hodgkin's lymphoma     RBBB (right bundle branch block)      Severe sepsis with acute organ dysfunction (H) 11/16/2015     Unspecified hereditary and idiopathic peripheral neuropathy        History of Present Illness   Hermilo Velasquez is an 87 year old male who presented with anemia.    Hospital Course   Hermilo Velasquez is a 87 year old male admitted on 10/23/2020 with abnormal labs.     Acute on chronic GI bleed.  Acute on chronic iron deficiency anemia.  Status post colonoscopy 10/25/2020 with AVM.  Gastric AVM's -- on EGD 8/17/20   Patient had been to his PCP on 10/15 with lower extremity edema and hemoglobin was 6.9.  Unclear why he was not contacted regarding his low hemoglobin, recommended by his provider to come into the ED 10/23 with hemoglobin of 6.6.  Received 1 unit blood transfusion.  Hemoglobin has been stable in the  7 range.  Was followed by [facility]. EGD unremarkable no signs of AVM or GI blood loss noticed.  Colonoscopy findings consistent with multiple AVMs in the cecum with 1 AVM actively oozing.  Hemostasis was achieved with argon plasma coagulation.  Was on IV pantoprazole, switched to oral PTA pantoprazole on discharge.  PTA also on famotidine, to continue. Discuss with PCP need for pantoprazole and famotidine.  GI recommend to hold Eliquis for 1 week.  Follow-up with outpatient Dr. Selby gastroenterology in 1 to 2 weeks.  Advance diet as able to.  Hemoglobin level in 1 week.  If symptomatic.     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 lifelong anticoagulation recommended.   Discussed with Dr. Florentino, hold eliquis atleast for a week.  Check hemoglobin level on PCP visit in 1 week and consider restarting anticoagulation.     Paroxysmal atrial fibrillation   Restarted PTA metoprolol on discharge.  Recommended to hold PTA Eliquis until PCP evaluation [lab draw] in 1 week.    CAD  Continue PTA Lipitor.  Restarted PTA metoprolol, Demadex at the time of discharge.     Hyperlipidemia:   Continue PTA statin    Chronic obstructive lung disease/emphysema- chronic oxygen at night  Continue PTA inhalers  Reports uses 2 L of oxygen at night, not consistent.  Continue home oxygen.     CKD stage III.  Creatinine at around baseline, monitor.     Chronic pruritus :  PTA Atarax       Bullous pemphigoid  stable.      Physical deconditioning.  Assist of 1 with cane to commode.  PT recommended discharge home.     Cat Bates MD, MD    Pending Results   Unresulted Labs Ordered in the Past 30 Days of this Admission     No orders found from 9/23/2020 to 10/24/2020.             Physical Exam   There were no vitals filed for this visit.  Vital Signs with Ranges  Temp:  [98  F (36.7  C)-98.7  F (37.1  C)] 98.1  F (36.7  C)  Pulse:  [58-90] 90  Resp:  [18-20] 18  BP:  (105-120)/(53-61) 105/53  SpO2:  [93 %-98 %] 93 %  I/O last 3 completed shifts:  In: 540 [P.O.:540]  Out: 1250 [Urine:1250]  PHYSICAL EXAM  GENERAL: Patient is in no distress. Alert and oriented.  HEART: Regular rate and rhythm. S1S2. No murmurs  LUNGS: Bilateral decreased breath sounds.  No crackles no wheezing.  Respirations unlabored  ABDOMEN: Soft, no abdominal tenderness, bowel sounds heard   NEURO: Moving all extremities, no focal weakness.  EXTREMITIES: No pedal edema.  SKIN: Warm, dry  PSYCHIATRY Cooperativ  )Consultations This Hospital Stay   GASTROENTEROLOGY IP CONSULT  PHYSICAL THERAPY ADULT IP CONSULT  CARE MANAGEMENT / SOCIAL WORK IP CONSULT    Time Spent on this Encounter   ICat MD, personally saw the patient today and spent greater than 30 minutes discharging this patient.  Discussed with patient, bedside RN.    Discharge Orders      Reason for your hospital stay    Been admitted to the hospital with anemia, received a blood transfusion.  Underwent EGD colonoscopy by Dr. Florentino gastroenterology.  Recommend to hold off anticoagulation for 1 week     Follow-up and recommended labs and tests     Follow up with primary care provider, Karson Bishop, within 7 days for hospital follow- up.  The following labs/tests are recommended: CBC, CMP.    Follow up in 1-2 weeks with [facility].  Follow-up with primary care provider and have labs drawn prior to restarting Eliquis.     Discharge Instructions    Hold Eliquis for 1 week from discharge.  Follow-up with primary care provider and have labs drawn prior to restarting Eliquis.  Advance diet gradually as able to tolerate.  Continue home oxygen at nighttime.  Aggressive incentive spirometry.     Activity    Your activity upon discharge: activity as tolerated     Diet    Follow this diet upon discharge: Orders Placed This Encounter      Mechanical/Dental Soft Diet       Discharge Medications   Current Discharge Medication List      CONTINUE these  medications which have NOT CHANGED    Details   atorvastatin (LIPITOR) 10 MG tablet Take 1 tablet (10 mg) by mouth daily  Qty: 90 tablet, Refills: 3    Associated Diagnoses: Mixed hyperlipidemia      famotidine (PEPCID) 40 MG tablet Take 1 tablet (40 mg) by mouth 2 times daily  Qty: 60 tablet, Refills: 1    Associated Diagnoses: Iron deficiency anemia due to chronic blood loss      ferrous sulfate (FE TABS) 325 (65 Fe) MG EC tablet Take 1 tablet (325 mg) by mouth daily  Qty: 90 tablet, Refills: 3    Associated Diagnoses: Iron deficiency anemia, unspecified iron deficiency anemia type; Anemia due to blood loss, acute      gabapentin (NEURONTIN) 300 MG capsule Take 2 capsules (600 mg) by mouth At Bedtime Rx written for 300 mg in the morning and 900 mg at bedtime, but unclear how patient is taking at home (possibly just taking 1 capsule in the evening)  Refills: 0    Associated Diagnoses: Neuropathy      metoprolol succinate ER (TOPROL-XL) 25 MG 24 hr tablet Take 0.5 tablets (12.5 mg) by mouth daily  Qty: 45 tablet, Refills: 0    Associated Diagnoses: Benign essential hypertension      pantoprazole (PROTONIX) 40 MG EC tablet Take 1 tablet (40 mg) by mouth 2 times daily (before meals)  Qty: 180 tablet, Refills: 3    Associated Diagnoses: Anemia due to blood loss, acute      tiotropium (SPIRIVA HANDIHALER) 18 MCG inhaled capsule Inhale 1 capsule (18 mcg) into the lungs daily  Qty: 90 capsule, Refills: 3    Associated Diagnoses: Chronic obstructive pulmonary disease, unspecified COPD type (H)      torsemide (DEMADEX) 10 MG tablet Take 10 mg by mouth daily Ordered 9/7/2020 for 30 day supply. Stopped by patient.      albuterol (PROAIR HFA/PROVENTIL HFA/VENTOLIN HFA) 108 (90 Base) MCG/ACT inhaler Inhale 1-2 puffs into the lungs every 6 hours as needed for shortness of breath / dyspnea or wheezing  Qty: 3 Inhaler, Refills: 5    Comments: Pharmacy may dispense brand covered by insurance (Proair, or proventil or ventolin or  "generic albuterol inhaler)  Associated Diagnoses: Chronic obstructive pulmonary disease, unspecified COPD type (H)      hydrOXYzine (VISTARIL) 50 MG capsule Take 50 mg by mouth every 6 hours as needed for itching      order for DME Equipment being ordered: Right neoprene knee brace.  ( Fax to [facility] fax  [phone])  Qty: 1 each, Refills: 0    Associated Diagnoses: Chronic pain of right knee      triamcinolone (KENALOG) 0.1 % external cream Apply topically 2 times daily as needed for irritation  Qty: 453.6 g, Refills: 3    Associated Diagnoses: Chronic pruritus           Allergies   Allergies   Allergen Reactions     Lidocaine Blisters     Allergy to lidocaine ointment     Omeprazole Itching     Pantoprazole Itching     Prevacid [Lansoprazole] Itching     Lasix [Furosemide] Rash     Penicillins Rash     \"broke out from injection\" 60 yrs ago         Discharge Disposition   Discharged to home  Condition at discharge: Good    DATA  Most Recent 3 CBC's:  Recent Labs   Lab Test 10/26/20  1415 10/26/20  0533 10/25/20  2341 10/25/20  1145 10/23/20  1617 10/23/20  1617   WBC  --  9.4  --  7.0  --  8.0   HGB 8.7* 7.5* 7.5* 9.4*   < > 6.6*   MCV  --  90  --  91  --  89   PLT  --  228  --  268  --  279    < > = values in this interval not displayed.      Most Recent 3 BMP's:  Recent Labs   Lab Test 10/25/20  1145 10/24/20  0823 10/23/20  1617    141 142   POTASSIUM 4.3 4.2 3.9   CHLORIDE 111* 113* 112*   CO2 25 25 25   BUN 10 16 22   CR 1.09 1.19 1.25   ANIONGAP 4 3 5   AMRITA 8.3* 7.9* 7.9*   GLC 87 86 95     Most Recent 2 LFT's:  Recent Labs   Lab Test 10/25/20  1145 10/23/20  1617   AST 17 12   ALT 11 10   ALKPHOS 127 115   BILITOTAL 0.5 0.1*     Most Recent TSH, T4 and A1c Labs:  Recent Labs   Lab Test 07/31/18  1104 02/21/18  1124   TSH 2.88 2.38   T4  --  1.21   A1C 5.8*  --          "

## 2020-10-26 NOTE — CONSULTS
Care Management Assessment and Discharge Consult    General Information  Assessment completed with:: PatientHermilo  Type of CM/SW Visit: Offer D/C Planning  Primary Care Provider verified and updated as needed?: Yes  Readmission Within the Last 30 Days: no previous admission in last 30 days        Advance Care Planning: Advance Care Planning Reviewed: no concerns identified          Communication Assessment  Patient's communication style: spoken language (English or Bilingual)  Hearing Difficulty or Deaf: yes Wear Glasses or Blind: yes    Cognitive  Cognitive/Neuro/Behavioral: WDL                      Living Environment:   People in home: spouse  Name(s) of People in Home: Roberta (wife)  Current living Arrangements: house          Family/Social Support:  Care provided by: self, spouse/significant other  Provides care for:    Marital Status:   Who is your support system?: Wife     Description of Support System: Supportive  Description of Support System: Supportive  Adequate family and caregiver support        Description of Support System: Supportive  Support Assessment: Adequate family and caregiver support    Current Resources:   Skilled Home Care Services:    Community Resources:    Equipment currently used at home: cane, straight  Supplies currently used at home:   None    Employment:  Employment Status: retired        Financial/Environmental Concerns: No concerns identified          Lifestyle & Psychosocial Needs:  Lifestyle     Physical activity     Days per week: 0 days     Minutes per session: 0 min     Stress: Not at all     Social Needs     Financial resource strain: Not hard at all     Food insecurity     Worry: Never true     Inability: Never true     Transportation needs     Medical: No     Non-medical: No     Socioeconomic History     Marital status:      Spouse name: Not on file     Number of children: Not on file     Years of education: Not on file     Highest education level: Not on  file   Relationships     Social connections     Talks on phone: Twice a week     Gets together: Twice a week     Attends Buddhism service: Never     Active member of club or organization: No     Attends meetings of clubs or organizations: Never     Relationship status:      Intimate partner violence     Fear of current or ex partner: No     Emotionally abused: No     Physically abused: No     Forced sexual activity: No     Tobacco Use     Smoking status: Former Smoker     Packs/day: 2.00     Years: 55.00     Pack years: 110.00     Quit date: 1998     Years since quittin.7     Smokeless tobacco: Never Used   Substance and Sexual Activity     Alcohol use: Yes     Alcohol/week: 0.0 standard drinks     Comment: 1 drink per day     Drug use: No     Sexual activity: Not Currently     Partners: Female       Functional Status:  Prior to admission patient needed assistance:          Mental Health Status:  WDL        Values/Beliefs:  Spiritual, Cultural Beliefs, Alevism Practices, Values that affect care:                   Discharge Planning:  Expected Discharge Date: 10/25/20     Concerns to be Addressed: adjustment to diagnosis/illness, care coordination/care conferences, discharge planning(ambulation difficulty, requires equipment;stair climbing difficulty, requires equipment;transferring difficulty, requires equipment)       Anticipated Discharge Disposition:    Anticipated Discharge Services:    Anticipated Discharge DME:      Patient/family educated on Medicare website which has current facility and service quality ratings:    Referrals Placed by CM/SW:    Education Provided on the Discharge Plan:    Patient/Family in Agreement with the Plan:  yes         Selected Continued Care - Admitted Since 10/23/2020    No services have been selected for the patient.         Additional Information: Discussed discharge plans with patient. Patient stating wife could provide transportation. Patient is on oxygen  and satting in the high 90s . Spouse was asked to bring oxygen from home for transportation.     Savannah Sarah RN  Inpatient Care Coordinator  [facility] Nory/Padmini  #[phone]

## 2020-10-26 NOTE — PLAN OF CARE
DATE & TIME: 10/26/2020 9882-6798  Cognitive Concerns/ Orientation : A&Ox4  BEHAVIOR & AGGRESSION TOOL COLOR: Green   CIWA SCORE: N/A  ABNL VS/O2: VSS on 3L O2, sating 93%. Baseline 2-3L at night and using O2 as needed during days. .   MOBILITY: Ax1 GB/cane to commode   PAIN MANAGMENT: Denies  DIET: [facility] soft diet. Tolerating well.   BOWEL/BLADDER: Continent. No BM since colonoscopy.   ABNL LAB/BG: Hgb 7.5 this AM, recheck at 1400.   DRAIN/DEVICES: L hand PIV SL  TELEMETRY RHYTHM: N/A  SKIN: WDL-BLLE trace edema   TESTS/PROCEDURES: Colonoscopy 10/26 with multiple interventions, see GI note. Pt okayed for mech soft diet.  D/C DAY/GOALS/PLACE: possible discharge home later today, pending stable hgb at 1400.   OTHER IMPORTANT INFO: LS wheezy on expiration at times. BLE neuropathy, baseline. PT recommended discharge home.

## 2020-10-26 NOTE — PROGRESS NOTES
[facility]  Gastroenterology Progress Note     Hermilo Velasquez MRN# [id number]   YOB: 1932 Age: 87 year old          Assessment and Plan:   Interval History: Patient has had stable hemoglobin over 2 draws. No further rectal bleeding. Tolerating diet    Hermilo Olson is a pleasant 87 year old male with lower GI bleed. 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 Cauterized. Patient had previously been on Eliquis- currently held.    Anemia  GI bleed- AVM of cecum  Patient had blood in stools. EGD negative for active GI bleed. Colonoscopy noted bleeding AVM of cecum. Hemoglobin stable around 7.5. 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              Review of Systems:   C: NEGATIVE for fever, chills, change in weight  E/M: NEGATIVE for ear, mouth and throat problems  R: NEGATIVE for significant cough or SOB  CV: NEGATIVE for chest pain, palpitations or peripheral edema             Medications:   I have reviewed this patient's current medications    atorvastatin  10 mg Oral Daily     ferrous sulfate  325 mg Oral Daily     gabapentin  600 mg Oral At Bedtime     pantoprazole (PROTONIX) IV  40 mg Intravenous Q12H     sodium chloride (PF)  3 mL Intracatheter Q8H     umeclidinium  1 puff  Inhalation Daily                  Physical Exam:   Vitals were reviewed  Vital Signs with Ranges  Temp:  [97.6  F (36.4  C)-98.7  F (37.1  C)] 98.1  F (36.7  C)  Pulse:  [58-90] 90  Resp:  [12-37] 18  BP: (105-132)/(46-77) 105/53  SpO2:  [84 %-100 %] 93 %  I/O last 3 completed shifts:  In: 180 [P.O.:180]  Out: 1250 [Urine:1250]  Constitutional: healthy, alert and no distress   Cardiovascular: negative, PMI normal. No lifts, heaves, or thrills. RRR. No murmurs, clicks gallops or rub  Respiratory: negative, Percussion normal. Good diaphragmatic excursion. Lungs clear  Head: Normocephalic. No masses, lesions, tenderness or abnormalities  Neck: Neck supple. No adenopathy. Thyroid symmetric, normal size,, Carotids without bruits.  Abdomen: Abdomen soft, non-tender. BS normal. No masses, organomegaly           Data:   I reviewed the patient's new clinical lab test results.   Recent Labs   Lab Test 10/26/20  0533 10/25/20  2341 10/25/20  1145 10/23/20  1617 10/23/20  1617 08/17/20  0948 08/17/20  0948 08/16/20  0829   WBC 9.4  --  7.0  --  8.0   < > 8.9 9.5   HGB 7.5* 7.5* 9.4*   < > 6.6*   < > 7.7* 7.5*   MCV 90  --  91  --  89   < > 82 81     --  268  --  279   < > 273 272   INR  --   --   --   --  1.39*  --  1.21* 1.30*    < > = values in this interval not displayed.     Recent Labs   Lab Test 10/25/20  1145 10/24/20  0823 10/23/20  1617   POTASSIUM 4.3 4.2 3.9   CHLORIDE 111* 113* 112*   CO2 25 25 25   BUN 10 16 22   ANIONGAP 4 3 5     Recent Labs   Lab Test 10/25/20  1145 10/23/20  1617 09/13/20  1256 08/16/20 0400 08/16/20 0400 03/22/20  1950 03/22/20  1950 03/22/20  1914 12/22/19  1614   ALBUMIN 2.6* 2.6* 2.6*   < >  --    < >  --  2.6*  --    BILITOTAL 0.5 0.1* 0.7   < >  --    < >  --  0.6  --    ALT 11 10 9   < >  --    < >  --  27  --    AST 17 12 16   < >  --    < >  --  101*  --    PROTEIN  --   --   --   --  Negative  --  10*  --  10*   LIPASE  --   --   --   --   --   --   --  54*  --     < > =  values in this interval not displayed.       I reviewed the patient's new imaging results.    All laboratory data reviewed  All imaging studies reviewed by me.    Sammie Cox PA-C,  10/26/2020  [facility]  Office : [phone]  Cell: [phone] (Dr. Florentino)  Cell: [phone] (Sammie Cox PA-C)

## 2020-10-27 ENCOUNTER — PATIENT OUTREACH (OUTPATIENT)
Dept: NURSING | Facility: CLINIC | Age: 85
End: 2020-10-27
Payer: COMMERCIAL

## 2020-10-27 ENCOUNTER — TELEPHONE (OUTPATIENT)
Dept: FAMILY MEDICINE | Facility: CLINIC | Age: 85
End: 2020-10-27

## 2020-10-27 DIAGNOSIS — D64.9 ANEMIA: Primary | ICD-10-CM

## 2020-10-27 LAB
BLD PROD TYP BPU: NORMAL
BLD UNIT ID BPU: 0
BLOOD PRODUCT CODE: NORMAL
BPU ID: NORMAL
TRANSFUSION STATUS PATIENT QL: NORMAL
TRANSFUSION STATUS PATIENT QL: NORMAL

## 2020-10-27 ASSESSMENT — ACTIVITIES OF DAILY LIVING (ADL): DEPENDENT_IADLS:: INDEPENDENT

## 2020-10-27 NOTE — TELEPHONE ENCOUNTER
Chief Complaint: Gastrointestinal Hemorrhage, Unspecified Gastrointestinal Hemorrhage Type,  MON 26-OCT-2020  0 / 3    [phone] (home)     
ED / Discharge Outreach Protocol    Patient Contact    Attempt # 1    Was call answered?  No.  Unable to leave message. Eduardo Recall     Yamilex EVERETT RN      
See care coordinator encounter .  This encounter closed     [facility]     Augusta Swanson  RN Care Coordinator   [facility] / [facility]   Phone: [phone]  Email :  [email or web address]    
Benefits, risks, and possible complications of procedure explained to patient/caregiver who verbalized understanding and gave verbal consent.

## 2020-10-27 NOTE — PROGRESS NOTES
Clinic Care Coordination Contact    Clinic Care Coordination Contact  OUTREACH    Referral Information:  Referral Source: IP Report    Primary Diagnosis: GI Disorders    Chief Complaint   Patient presents with     Clinic Care Coordination - Post Hospital     Clinic Care Coordination RN        Universal Utilization:   Discharge Summary  Hospitalist     Date of Admission:  10/23/2020  Date of Discharge:  10/26/2020  Discharging Provider: Cat Bates MD        Primary Care Physician      Karson Bishop  Primary Care Provider Phone Number: [phone]  Primary Care Provider Fax Number: [phone]     PRINCIPAL DIAGNOSIS  Acute on chronic GI bleed.  Acute on chronic iron deficiency anemia.     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 Vasques      Aortic stenosis       Severe AS, 9/2015 AVR - [facility] TRIFECTA Bovine bioprosthesis 25MM TF-25A     Atrial fibrillation (H)       9/2015 Paroxysmal post op Afib - discharged on Warfarin and a beta blocker     COPD (chronic obstructive pulmonary disease) (H)       Deep vein thrombosis (H)       GERD (gastroesophageal reflux disease)       Heart murmur       Monoclonal gammopathy       plasmacyte prominent causing monoclonal gammopathy     Need for SBE (subacute bacterial endocarditis) prophylaxis       Neuropathy       Other and unspecified hyperlipidemia       Other malignant lymphomas       non hodgkin's lymphoma     RBBB (right bundle branch block)       Severe sepsis with acute organ dysfunction (H) 11/16/2015     Unspecified hereditary and idiopathic peripheral neuropathy                    History of Present Illness     Hermilo Velasquez is an 87 year old male who presented with anemia.    Clinic Utilization  Difficulty keeping appointments:: No  Compliance Concerns: No  No-Show Concerns: No  No PCP office visit in Past Year: No  Utilization    Last  refreshed: 10/27/2020  9:31 AM: Hospital Admissions 5           Last refreshed: 10/27/2020  9:31 AM: ED Visits 5           Last refreshed: 10/27/2020  9:31 AM: No Show Count (past year) 2              Current as of: 10/27/2020  9:31 AM              Clinical Concerns:  Current Medical Concerns:  Patient reports he continues to be a little tired due to not enough sleep in the hospital.  Patient will pace his activity through out the day.  Patient has a future appointment with Dr Florentino 11/12/2020  And encouraged to make a hospital follow up appointment with PCP  Patient agrees with the plan     Current Behavioral Concerns: Not discussed     Education Provided to patient: Monitor for any blood in the stool or emesis    Pain  Pain (GOAL):: No  Health Maintenance Reviewed: Due/Overdue   Health Maintenance Due   Topic Date Due     HF ACTION PLAN  11/03/1932     URINE DRUG SCREEN  11/03/1932     MEDICARE ANNUAL WELLNESS VISIT  11/03/1997     Clinical Pathway: None    Medication Management:  Medication reconciliation status: Medications reviewed and reconciled.  Changed medications per patient report.     Functional Status:  Dependent ADLs:: Ambulation-cane, Ambulation-walker  Dependent IADLs:: Independent  Bed or wheelchair confined:: No  Mobility Status: Independent w/Device    Living Situation:  Current living arrangement:: I live in a private home with spouse    Lifestyle & Psychosocial Needs:  Lifestyle     Physical activity     Days per week: 0 days     Minutes per session: 0 min     Stress: Not at all     Social Needs     Financial resource strain: Not hard at all     Food insecurity     Worry: Never true     Inability: Never true     Transportation needs     Medical: No     Non-medical: No     Diet:: Regular  Inadequate nutrition (GOAL):: No  Tube Feeding: No  Inadequate activity/exercise (GOAL):: No  Significant changes in sleep pattern (GOAL): No  Transportation means:: Accessible car     Yarsanism or spiritual  beliefs that impact treatment:: No  Mental health DX:: No  Mental health management concern (GOAL):: No  Chemical Dependency Status: Not Applicable  Informal Support system:: Spouse   Socioeconomic History     Marital status:      Spouse name: Not on file     Number of children: Not on file     Years of education: Not on file     Highest education level: Not on file   Relationships     Social connections     Talks on phone: Twice a week     Gets together: Twice a week     Attends Restorationist service: Never     Active member of club or organization: No     Attends meetings of clubs or organizations: Never     Relationship status:      Intimate partner violence     Fear of current or ex partner: No     Emotionally abused: No     Physically abused: No     Forced sexual activity: No     Tobacco Use     Smoking status: Former Smoker     Packs/day: 2.00     Years: 55.00     Pack years: 110.00     Quit date: 1998     Years since quittin.7     Smokeless tobacco: Never Used   Substance and Sexual Activity     Alcohol use: Yes     Alcohol/week: 0.0 standard drinks     Comment: 1 drink per day     Drug use: No     Sexual activity: Not Currently     Partners: Female               Resources and Interventions:  Current Resources:      Community Resources: None  Supplies used at home:: Oxygen Tubing/Supplies  Equipment Currently Used at Home: cane, straight, walker, rolling  Employment Status: retired)   )    Advance Care Plan/Directive  Advanced Care Plans/Directives on file:: Yes  Type Advanced Care Plans/Directives: (Resuscitation guideline)  Advanced Care Plan/Directive Status: Not Applicable    Referrals Placed: None     Goals:   Goals        General    Improve chronic symptoms (pt-stated)     Notes - Note created  2020 11:26 AM by Augusta Swanson, RN    Goal Statement: I will seek medical help if experiencing increased shortness of breat episodes,fever,wheezing or coughing up a colored sputum  Date  Goal set: 9/17/2020  Barriers: Recent hospitalization  Strengths: Supportive wife   Date to Achieve By: 11/17/2020  Patient expressed understanding of goal: *Yes  Action steps to achieve this goal:  1. I will Follow the COPD Action Plan  2. I will finish the antibiotic and Deltisone  3. I will use oxygen at 2 L at night and 5L with increased activity  4. I will keep my oxygen saturation at 88%  CC will follow up in 1-2 weeks            Monitoring (pt-stated)     Notes - Note edited  10/1/2020 10:59 AM by Augusta Swanson, RN    Goal Statement: I will seek medical help if experiencing ,Dizzines,shortness of breath,blood in stool ,abdominal pain or fever   Date Goal set: 8/19/2020  Barriers: None identified   Strengths: feeling stronger now that he is on iron   Date to Achieve By: 11/19/2020  Patient expressed understanding of goal: Yes  Action steps to achieve this goal:  1. I will take iron as directed   2. I will keep future appointments with the Gastroenterologist   3. CC will follow up in 2-4 weeks             Patient/Caregiver understanding: Expresses good understanding of discharge instructions     Outreach Frequency: 2 weeks      Plan:  Patient will make a hospital follow up with PCP and keep future appointment with Dr. Florentino /Gastroenterologist    CC will follow up in 1-2 weeks   [facility] Nory Swanson  RN Care Coordinator   [facility]   Phone: [phone]  Email :  [email or web address]

## 2020-11-06 ENCOUNTER — VIRTUAL VISIT (OUTPATIENT)
Dept: FAMILY MEDICINE | Facility: CLINIC | Age: 85
End: 2020-11-06
Payer: COMMERCIAL

## 2020-11-06 DIAGNOSIS — I26.99 PULMONARY EMBOLISM, BILATERAL (H): ICD-10-CM

## 2020-11-06 DIAGNOSIS — I48.0 PAROXYSMAL ATRIAL FIBRILLATION (H): ICD-10-CM

## 2020-11-06 DIAGNOSIS — D64.9 ANEMIA, UNSPECIFIED TYPE: ICD-10-CM

## 2020-11-06 DIAGNOSIS — K92.2 GASTROINTESTINAL HEMORRHAGE, UNSPECIFIED GASTROINTESTINAL HEMORRHAGE TYPE: Primary | ICD-10-CM

## 2020-11-06 PROCEDURE — 99214 OFFICE O/P EST MOD 30 MIN: CPT | Mod: 95 | Performed by: INTERNAL MEDICINE

## 2020-11-06 NOTE — PROGRESS NOTES
"Hermilo Velasquez is a 88 year old male who is being evaluated via a billable telephone visit.      The patient has been notified of following:     \"This telephone visit will be conducted via a call between you and your physician/provider. We have found that certain health care needs can be provided without the need for a physical exam.  This service lets us provide the care you need with a short phone conversation.  If a prescription is necessary we can send it directly to your pharmacy.  If lab work is needed we can place an order for that and you can then stop by our lab to have the test done at a later time.    Telephone visits are billed at different rates depending on your insurance coverage. During this emergency period, for some insurers they may be billed the same as an in-person visit.  Please reach out to your insurance provider with any questions.    If during the course of the call the physician/provider feels a telephone visit is not appropriate, you will not be charged for this service.\"    Patient has given verbal consent for Telephone visit?  Yes    What phone number would you like to be contacted at? [phone]    How would you like to obtain your AVS? Mail a copy    Subjective     Hermilo Velasquez is a 88 year old male who presents via phone visit today for the following health issues:    [facility]       Hospital Follow-up Visit:    Hospital/Nursing Home/IP Rehab Facility: [facility]  Date of Admission: 10-  Date of Discharge: 10-  Reason(s) for Admission: Gastrointestinal hemorrhage, unspecified gastrointestinal hemorrhage type      Was your hospitalization related to COVID-19? No   Problems taking medications regularly:  None  Medication changes since discharge: None  Problems adhering to non-medication therapy:  None    Summary of hospitalization:  [facility] discharge summary reviewed  Diagnostic Tests/Treatments reviewed.  Follow up needed: GI   Other " Healthcare Providers Involved in Patient s Care:         None  Update since discharge: improved. Post Discharge Medication Reconciliation: discharge medications reconciled and changed, per note/orders.  Plan of care communicated with patient          No blood in stools  He feels much better  He has not restarted Eliquis , yet  He has a follow up appointment with Dr. Florentino       Review of Systems   Constitutional, HEENT, cardiovascular, pulmonary, gi and gu systems are negative, except as otherwise noted.       Objective   Vitals - Patient Reported  Weight (Patient Reported): 80.3 kg (177 lb)  Temperature (Patient Reported): 98  F (36.7  C)      Vitals:  No vitals were obtained today due to virtual visit.    healthy, alert and no distress  PSYCH: Alert and oriented times 3; coherent speech, normal   rate and volume, able to articulate logical thoughts, able   to abstract reason, no tangential thoughts, no hallucinations   or delusions  His affect is normal  RESP: No cough, no audible wheezing, able to talk in full sentences  Remainder of exam unable to be completed due to telephone visits    Discharge hemoglobin 8.7        Assessment/Plan:    Assessment & Plan     Hermilo was seen today for hospital f/u.    Diagnoses and all orders for this visit:    Gastrointestinal hemorrhage, unspecified gastrointestinal hemorrhage type    Anemia, unspecified type    Paroxysmal atrial fib -- on Eliquis     Hx of Bilateral PE 2016 -- S/P IVC filter  -     apixaban ANTICOAGULANT (ELIQUIS ANTICOAGULANT) 5 MG tablet; Take 1 tablet (5 mg) by mouth 2 times daily    Pulmonary embolism, bilateral (H)  -     apixaban ANTICOAGULANT (ELIQUIS ANTICOAGULANT) 5 MG tablet; Take 1 tablet (5 mg) by mouth 2 times daily          No signs or symptoms of recurrent bleeding  I think he should restart Eliquis and he was advised to do so   He will follow up with Dr. Florentino as directed       Return in about 1 month (around 12/6/2020) for recheck.  Patient  instructed to return to clinic or contact us sooner if symptoms worsen or new symptoms develop.     Karson Bishop MD  [facility]    Phone call duration:  15 minutes

## 2020-11-09 ENCOUNTER — DOCUMENTATION ONLY (OUTPATIENT)
Dept: FAMILY MEDICINE | Facility: CLINIC | Age: 85
End: 2020-11-09

## 2020-11-09 ENCOUNTER — TRANSFERRED RECORDS (OUTPATIENT)
Dept: HEALTH INFORMATION MANAGEMENT | Facility: CLINIC | Age: 85
End: 2020-11-09

## 2020-11-09 DIAGNOSIS — K92.2 GASTROINTESTINAL HEMORRHAGE, UNSPECIFIED GASTROINTESTINAL HEMORRHAGE TYPE: ICD-10-CM

## 2020-11-09 DIAGNOSIS — K92.2 GASTROINTESTINAL HEMORRHAGE, UNSPECIFIED GASTROINTESTINAL HEMORRHAGE TYPE: Primary | ICD-10-CM

## 2020-11-09 PROCEDURE — 85018 HEMOGLOBIN: CPT | Performed by: INTERNAL MEDICINE

## 2020-11-09 PROCEDURE — 36415 COLL VENOUS BLD VENIPUNCTURE: CPT | Performed by: INTERNAL MEDICINE

## 2020-11-09 NOTE — LETTER
November 10, 2020      Hermilo MADRID Leakevin  [address] [facility] [address]        Dear ,    Good news! Your hemoglobin is stable.  You can restart Eliquis (blood thinner).  Please follow up with Dr. Florentino as directed.             Sincerely,     Dr. Bishop     Resulted Orders   **Hemoglobin FUTURE anytime   Result Value Ref Range    Hemoglobin 8.8 (L) 13.3 - 17.7 g/dL

## 2020-11-09 NOTE — PROGRESS NOTES
Patient came to lab and had no orders. Lab rom three tubes (Green Gel, Red Gel, Purple) and urine please place orders as needed.    Patient has an upcomming appointment with you.

## 2020-11-10 ENCOUNTER — TELEPHONE (OUTPATIENT)
Dept: FAMILY MEDICINE | Facility: CLINIC | Age: 85
End: 2020-11-10

## 2020-11-10 LAB — HGB BLD-MCNC: 8.8 G/DL (ref 13.3–17.7)

## 2020-11-10 NOTE — RESULT ENCOUNTER NOTE
The following letter pertains to your most recent diagnostic tests:    Good news! Your hemoglobin is stable.  You can restart Eliquis (blood thinner).  Please follow up with Dr. Florentino as directed.            Sincerely,    Dr. Bishop

## 2020-11-10 NOTE — RESULT ENCOUNTER NOTE
Can you please call Chapito and inform him that his hemoglobin is stable.  He can restart Eliquis and he should follow up with Dr. Florentino as directed.

## 2020-11-10 NOTE — TELEPHONE ENCOUNTER
----- Message from Karson Bishop MD sent at 11/10/2020 12:21 PM CST -----  Can you please call Chapito and inform him that his hemoglobin is stable.  He can restart Eliquis and he should follow up with Dr. Florentino as directed.

## 2020-11-11 NOTE — TELEPHONE ENCOUNTER
Left detailed message on bolded VM number.  To ensure patient got message, I asked that he call back to confirm.  Will leave encounter open and recall him if we don't hear soon    Analisa Vigil RN on 11/11/2020 at 2:00 PM

## 2020-11-12 NOTE — TELEPHONE ENCOUNTER
"Reached patient and discussed message below.  He saw  yesterday.  Is scheduled for a \"pill\" cam procedure in [place].  Hoping to have this done closer to [place], but understands that the test may be limited to certain locations.    Analisa Vigil RN on 11/12/2020 at 10:05 AM    "

## 2020-11-16 DIAGNOSIS — E78.2 MIXED HYPERLIPIDEMIA: ICD-10-CM

## 2020-11-16 RX ORDER — ATORVASTATIN CALCIUM 10 MG/1
10 TABLET, FILM COATED ORAL DAILY
Qty: 90 TABLET | Refills: 0 | Status: SHIPPED | OUTPATIENT
Start: 2020-11-16 | End: 2021-02-01

## 2020-11-19 ENCOUNTER — TELEPHONE (OUTPATIENT)
Dept: FAMILY MEDICINE | Facility: CLINIC | Age: 85
End: 2020-11-19

## 2020-11-19 ENCOUNTER — PATIENT OUTREACH (OUTPATIENT)
Dept: NURSING | Facility: CLINIC | Age: 85
End: 2020-11-19
Payer: COMMERCIAL

## 2020-11-19 DIAGNOSIS — D64.9 ANEMIA, UNSPECIFIED TYPE: Primary | ICD-10-CM

## 2020-11-19 DIAGNOSIS — K92.2 GASTROINTESTINAL HEMORRHAGE, UNSPECIFIED GASTROINTESTINAL HEMORRHAGE TYPE: ICD-10-CM

## 2020-11-19 ASSESSMENT — ACTIVITIES OF DAILY LIVING (ADL): DEPENDENT_IADLS:: INDEPENDENT

## 2020-11-19 NOTE — TELEPHONE ENCOUNTER
Reason for call:  Patient reporting a symptom    Symptom or request: NO Symptom but low BP    Duration (how long have symptoms been present): just this afternoon    Have you been treated for this before?     Additional comments: Pt call and stated the he check his BP this afternoon and the number look low for him     80/40   77/38     Phone Number patient can be reached at:  Cell number on file:    Telephone Information:   Mobile [phone]       Best Time:  anytime    Can we leave a detailed message on this number:  YES     Pass to a Triage Nurse     Call taken on 11/19/2020 at 12:19 PM by Iman Mcrae

## 2020-11-19 NOTE — TELEPHONE ENCOUNTER
Can't stop the los dose of metoprolol due to Afib  Make sure he does not take torsemide   Make sure he takes in plenty of fluids and does not skip meals  If he develops symptoms from the low blood pressure he should go back to ER, but no need for that if he is without symptoms of dizziness/near syncope fevers or chills etc

## 2020-11-19 NOTE — TELEPHONE ENCOUNTER
Left detailed message of below, repeated twice.  Advised pt to call back to clinic with any questions.  If avail, possibly triage could check tomorrow on pt.

## 2020-11-19 NOTE — TELEPHONE ENCOUNTER
"Spoke with Pt:     Calls to report BP Readin/40   77/38       In kitchen initially- inquired if he could make it to his reclining chair    He denied dizziness, even upon standing (carefully) and walking into living room to go to the Recliner     Directed to use reclining chair and bring legs up as much as possible, use pillows on reclining chair- he got his legs \"way up..they're above my heart\" (used a \"sponge\")     Denies shortness of breath \"I am not short of breath\"   Had used oximeter earlier which read 92%     Recheck with legs elevated:   /37  HR 69     Recheck again 5 minutes later:   106/48      Denies signs of bleeding \"no nothing\"     BM: \"it looked good the last time I saw\" 2 days ago   Light brown in color     Takes 1/2 tablet of Metoprolol Xl     Denies taking torsemide     Last Hgb :     Hemoglobin   Date Value Ref Range Status   2020 8.8 (L) 13.3 - 17.7 g/dL Final       Advised to restart Eliquis     Routing to PCP to please advise-- pharmacy is pended, if needed     Chrissy ALICEA RN      "

## 2020-11-19 NOTE — PROGRESS NOTES
Clinic Care Coordination Contact    Follow Up Progress Note    Date of Admission:  10/23/2020  Date of Discharge:  10/26/2020  Anemia   Acute on chronic GI bleed.  Acute on chronic iron deficiency anemia.  Status post colonoscopy 10/25/2020 with AVM.  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  Patient took his blood pressure while we were on the call and it was 80/40  Denies any lightheadedness and no blood seen in his stool.  Patient was instructed to rest in the recliner with his feet up and drink plenty of water.  Patient replaced the batteries in he blood pressure machine about a month ago.  Patient states in the past he has brought meter into the clinic to be checked and it was accurate  Patient is wondering if he might need another blood transfusion   Patient has a follow up appointment with Dr Florentino/Gastroeneterologist in the next 2 weeks     Goals addressed this encounter:   Goals Addressed                 This Visit's Progress      Monitoring (pt-stated)   80%     Goal Statement: I will seek medical help if experiencing ,Dizzines,shortness of breath,blood in stool ,abdominal pain or fever   Date Goal set: 8/19/2020  Barriers: None identified   Strengths: feeling stronger now that he is on iron   Date to Achieve By: 11/19/2020  Patient expressed understanding of goal: Yes  Action steps to achieve this goal:  1. I will take iron as directed   2. I will keep future appointments with the Gastroenterologist   3. I will monitor my blood pressure daily   4,. CC will follow up in 2-4 weeks              Intervention/Education provided during outreach: Continue to monitor blood pressure and to call if lightheaded      Outreach Frequency: 2 weeks    Plan:   Patient awaits a return call from the clinic in regards to his low blood pressure reading   Care Coordinator will follow up in 3=5 business days   [facility]      Augusta Swanson RN Care Coordinator   [facility] / [facility] -[facility]   Phone: [phone]  Email :  [email or web address]

## 2020-11-23 NOTE — TELEPHONE ENCOUNTER
"Reached patient.  His BP remains low, but somewhat better 88/46 most recent.    Admits that he doesn't drink enough water.  Advised filling a 48 oz.container with water and work on drinking that throughout today.    He stopped Torsemide \"quite awhile ago\"  Continues to take 1/2 tablet Metoprolol daily.    No dizziness. Very talkative; states he feels pretty good.    Close observation; wife is present and will monitor for any signs of weakness, dizziness, other.    Call clinic with any concerns.  Analisa Vigil RN on 11/23/2020 at 10:19 AM    "

## 2020-12-07 ENCOUNTER — PATIENT OUTREACH (OUTPATIENT)
Dept: NURSING | Facility: CLINIC | Age: 85
End: 2020-12-07
Payer: COMMERCIAL

## 2020-12-07 ENCOUNTER — VIRTUAL VISIT (OUTPATIENT)
Dept: FAMILY MEDICINE | Facility: CLINIC | Age: 85
End: 2020-12-07
Payer: COMMERCIAL

## 2020-12-07 DIAGNOSIS — M17.11 PRIMARY OSTEOARTHRITIS OF RIGHT KNEE: ICD-10-CM

## 2020-12-07 DIAGNOSIS — K92.2 GASTROINTESTINAL HEMORRHAGE, UNSPECIFIED GASTROINTESTINAL HEMORRHAGE TYPE: Primary | ICD-10-CM

## 2020-12-07 DIAGNOSIS — I10 ESSENTIAL HYPERTENSION WITH GOAL BLOOD PRESSURE LESS THAN 140/90: ICD-10-CM

## 2020-12-07 DIAGNOSIS — D64.9 ANEMIA, UNSPECIFIED TYPE: ICD-10-CM

## 2020-12-07 DIAGNOSIS — I48.0 PAROXYSMAL ATRIAL FIBRILLATION (H): ICD-10-CM

## 2020-12-07 DIAGNOSIS — D64.9 ANEMIA, UNSPECIFIED TYPE: Primary | ICD-10-CM

## 2020-12-07 PROCEDURE — 99443 PR PHYSICIAN TELEPHONE EVALUATION 21-30 MIN: CPT | Mod: 95 | Performed by: INTERNAL MEDICINE

## 2020-12-07 ASSESSMENT — ACTIVITIES OF DAILY LIVING (ADL): DEPENDENT_IADLS:: INDEPENDENT

## 2020-12-07 NOTE — PROGRESS NOTES
"Hermilo Velasquez is a 88 year old male who is being evaluated via a billable telephone visit.      The patient has been notified of following:     \"This telephone visit will be conducted via a call between you and your physician/provider. We have found that certain health care needs can be provided without the need for a physical exam.  This service lets us provide the care you need with a short phone conversation.  If a prescription is necessary we can send it directly to your pharmacy.  If lab work is needed we can place an order for that and you can then stop by our lab to have the test done at a later time.    Telephone visits are billed at different rates depending on your insurance coverage. During this emergency period, for some insurers they may be billed the same as an in-person visit.  Please reach out to your insurance provider with any questions.    If during the course of the call the physician/provider feels a telephone visit is not appropriate, you will not be charged for this service.\"    Patient has given verbal consent for Telephone visit?  Yes    What phone number would you like to be contacted at? [phone]    How would you like to obtain your AVS? Mail a copy    Subjective     Hermilo Velasquez is a 88 year old male who presents via phone visit today for the following health issues:    HPI     Chief Complaint   Patient presents with     Follow Up            Follow up GI bleeding from AVMs, history of pulmonary embolism, atrial fibrillation, acute blood loss anemia  Has cold intolerance for several months mild to moderate  Rare intermittent dizziness dating back for years, but blood pressures checked at home remain at the low end of normal  He had many questions about his oxygen machine  He had questions about a capsule endoscopy recommended by his GI doc  He has had knee pain for years that has responded to cortisone injections  His pain has been worse for several years and he desires " another shot  He is not sure his blood pressure cuff is accurate     Review of Systems          Objective   Vitals - Patient Reported  Systolic (Patient Reported): 100  Diastolic (Patient Reported): 44  Weight (Patient Reported): 80.7 kg (178 lb)  Pulse (Patient Reported): 67      Vitals:  No vitals were obtained today due to virtual visit.    healthy, alert and no distress  PSYCH: Alert and oriented times 3; coherent speech, normal   rate and volume, able to articulate logical thoughts, able   to abstract reason, no tangential thoughts, no hallucinations   or delusions  His affect is normal  RESP: No cough, no audible wheezing, able to talk in full sentences  Remainder of exam unable to be completed due to telephone visits            Assessment/Plan:    Assessment & Plan     Gastrointestinal hemorrhage, unspecified gastrointestinal hemorrhage type  Recheck hemoglobin when he returns     Anemia, unspecified type  Could cause cold intolerance plan to recheck hemoglobin in clinic     Essential hypertension with goal blood pressure less than 140/90  Recheck blood pressure with face to face appointment       Primary osteoarthritis of right knee  Plan for face to face visit to consider injection  Check blood pressure and blood pressure cuff then too along with hemoglobin and TSH for cold intolerance complaints               Return in about 2 weeks (around 12/21/2020) for face to face visit as soon as appt available for knee injection, bp check and labs .  Patient instructed to return to clinic or contact us sooner if symptoms worsen or new symptoms develop.     Karson Bishop MD  [facility]    Phone call duration:  23 minutes

## 2020-12-07 NOTE — PROGRESS NOTES
Clinic Care Coordination Contact    Follow Up Progress Note   Date of Admission:  10/23/2020  Date of Discharge:  10/26/2020  Discharging Provider: Cat Bates MD        Primary Care Physician      Karson Bishop  Primary Care Provider Phone Number: [phone]  Primary Care Provider Fax Number: [phone]     PRINCIPAL DIAGNOSIS  Acute on chronic GI bleed.  Acute on chronic iron deficiency anemia.     Assessment: Brief conversation with the patient today .    Patient bought a new blood pressure machine and his readings continue to be low  Below 100 systolic and 48 diastolic.  Just short periods of intermittent dizziness .  Patient is concentrating on drinking more water and describes as two tall glasses a day   Patient has a telephone visit this afternoon with PCP to discuss further   Goals addressed this encounter:   Goals Addressed                 This Visit's Progress      Improve chronic symptoms (pt-stated)   70%     Goal Statement: I will seek medical help if experiencing increased shortness of breath episodes,fever,wheezing or coughing up a colored sputum  Date Goal set: 9/17/2020  Barriers: Recent hospitalization  Strengths: Supportive wife   Date to Achieve By: 1/7/2021  Patient expressed understanding of goal: *Yes  Action steps to achieve this goal:  1. I will Follow the COPD Action Plan  2. I will finish the antibiotic and Deltisone  3. I will use oxygen at 2 L at night and 5L with increased activity  4. I will keep my oxygen saturation at 88%  5. CC will follow up in monthly              Monitoring (pt-stated)   80%     Goal Statement: I will seek medical help if experiencing ,Dizzines,shortness of breath,blood in stool ,abdominal pain or fever   Date Goal set: 8/19/2020  Barriers: None identified   Strengths: feeling stronger now that he is on iron   Date to Achieve By: 1/7/2021  Patient expressed understanding of goal: Yes  Action steps to achieve this goal:  1. I will take iron as directed    2. I will keep future appointments with the Gastroenterologist   3. I will monitor my blood pressure daily   4.. I will drink 6-8 glasses of water daily   5.. CC will follow up monthly             Intervention/Education provided during outreach: Continue daily blood pressures and continue to hydrate by drinking plenty of water      Outreach Frequency: 2 weeks    Plan:     Care Coordinator will follow up in 1-2 weeks     [facility]     Augusta Swanson RN Care Coordinator   [facility] / [facility] -[facility] -[facility]   Phone: [phone]  Email :  [email or web address]

## 2020-12-16 ENCOUNTER — APPOINTMENT (OUTPATIENT)
Dept: URBAN - METROPOLITAN AREA CLINIC 255 | Age: 85
Setting detail: DERMATOLOGY
End: 2020-12-21

## 2020-12-16 DIAGNOSIS — L85.3 XEROSIS CUTIS: ICD-10-CM

## 2020-12-16 DIAGNOSIS — L82.0 INFLAMED SEBORRHEIC KERATOSIS: ICD-10-CM

## 2020-12-16 PROCEDURE — OTHER LIQUID NITROGEN: OTHER

## 2020-12-16 PROCEDURE — OTHER MIPS QUALITY: OTHER

## 2020-12-16 PROCEDURE — OTHER COUNSELING: OTHER

## 2020-12-16 PROCEDURE — 99213 OFFICE O/P EST LOW 20 MIN: CPT | Mod: 25

## 2020-12-16 PROCEDURE — 17111 DESTRUCT LESION 15 OR MORE: CPT

## 2020-12-16 ASSESSMENT — LOCATION DETAILED DESCRIPTION DERM
LOCATION DETAILED: LEFT INFERIOR LATERAL UPPER BACK
LOCATION DETAILED: RIGHT SUPERIOR LATERAL LOWER BACK
LOCATION DETAILED: RIGHT CLAVICULAR NECK
LOCATION DETAILED: RIGHT SUPERIOR UPPER BACK
LOCATION DETAILED: RIGHT RIB CAGE
LOCATION DETAILED: LEFT INFERIOR UPPER BACK
LOCATION DETAILED: LEFT LATERAL UPPER BACK
LOCATION DETAILED: RIGHT LATERAL ABDOMEN
LOCATION DETAILED: LEFT MEDIAL UPPER BACK
LOCATION DETAILED: LEFT RIB CAGE
LOCATION DETAILED: LEFT MID-UPPER BACK
LOCATION DETAILED: RIGHT MEDIAL UPPER BACK
LOCATION DETAILED: LEFT SUPERIOR LATERAL MIDBACK
LOCATION DETAILED: INFERIOR THORACIC SPINE
LOCATION DETAILED: LEFT INFERIOR LATERAL MIDBACK
LOCATION DETAILED: RIGHT SUPERIOR LATERAL MIDBACK
LOCATION DETAILED: RIGHT INFERIOR LATERAL MIDBACK
LOCATION DETAILED: RIGHT SUPERIOR MEDIAL UPPER BACK
LOCATION DETAILED: LEFT CLAVICULAR NECK
LOCATION DETAILED: LEFT INFERIOR LATERAL NECK
LOCATION DETAILED: RIGHT MID-UPPER BACK
LOCATION DETAILED: RIGHT INFERIOR MEDIAL MIDBACK

## 2020-12-16 ASSESSMENT — LOCATION SIMPLE DESCRIPTION DERM
LOCATION SIMPLE: RIGHT UPPER BACK
LOCATION SIMPLE: ABDOMEN
LOCATION SIMPLE: LEFT UPPER BACK
LOCATION SIMPLE: UPPER BACK
LOCATION SIMPLE: RIGHT LOWER BACK
LOCATION SIMPLE: LEFT ANTERIOR NECK
LOCATION SIMPLE: RIGHT ANTERIOR NECK
LOCATION SIMPLE: LEFT LOWER BACK

## 2020-12-16 ASSESSMENT — LOCATION ZONE DERM
LOCATION ZONE: TRUNK
LOCATION ZONE: NECK

## 2020-12-16 NOTE — PROCEDURE: COUNSELING
Patient Specific Counseling (Will Not Stick From Patient To Patient): Recommended to use Aveeno body wash to cleanse his skin . \\nDiscontinue bar soap. \\nContinue using Triamcinolone cream, can increase to twice a day application.
Detail Level: Simple
Detail Level: Generalized

## 2020-12-17 ENCOUNTER — PATIENT OUTREACH (OUTPATIENT)
Dept: NURSING | Facility: CLINIC | Age: 85
End: 2020-12-17
Payer: COMMERCIAL

## 2020-12-17 DIAGNOSIS — D64.9 ANEMIA, UNSPECIFIED TYPE: Primary | ICD-10-CM

## 2020-12-17 ASSESSMENT — ACTIVITIES OF DAILY LIVING (ADL): DEPENDENT_IADLS:: INDEPENDENT

## 2020-12-17 NOTE — PROGRESS NOTES
Clinic Care Coordination Contact    Follow Up Progress Note   Date of Admission:  10/23/2020  Date of Discharge:  10/26/2020  Acute on chronic GI bleed.  Acute on chronic iron deficiency anemia.  Status post colonoscopy 10/25/2020 with AVM.  Gastric AVM's -- on EGD 8/17/20      Assessment:Patient report his blood pressure reading continue to be low  95/50 Reports Intermittent dizziness .  PCP is aware of theses symptoms and he has a follow up appointment 12/21   Patient continues to wear his oxygen at 2 liter continuously  Pulse oximeter is 91-93%   Patient continues to take iron daily and has a future appointment with the Gastroenterologist 12/28/2020  Patient admits he only drinks 1 gals of water a day.  Agrees to increase water intake.  Recommended 6-8 glasses a day.  This could increased blood pressure readings   Goals addressed this encounter:   Goals Addressed                 This Visit's Progress      Improve chronic symptoms (pt-stated)   80%     Goal Statement: I will seek medical help if experiencing increased shortness of breath episodes,fever,wheezing or coughing up a colored sputum  Date Goal set: 9/17/2020  Barriers: Recent hospitalization  Strengths: Supportive wife   Date to Achieve By: 2/7/2021  Patient expressed understanding of goal: *Yes  Action steps to achieve this goal:  1.  I will Follow the COPD Action Plan  2.. I will use oxygen at 2 L at night and 5L with increased activity  3.. I will keep my oxygen saturation at 90% and above   4.  Care Coordinator will follow up in monthly              Monitoring (pt-stated)   90%     Goal Statement: I will seek medical help if experiencing ,Dizzines,shortness of breath,blood in stool ,abdominal pain or fever   Date Goal set: 8/19/2020  Barriers: None identified   Strengths: feeling stronger now that he is on iron   Date to Achieve By: 2/7/2021  Patient expressed understanding of goal: Yes  Action steps to achieve this goal:  1. I will take iron as  directed   2. I will keep future appointments with the Gastroenterologist   3. I will monitor my blood pressure daily   4.. I will drink 6-8 glasses of water daily   5.. Care Coordinator will follow up monthly             Intervention/Education provided during outreach: CC informed patient he will have a new RN Care Coordinator effective 12/21/2020     Outreach Frequency: monthly    Plan:   Patient will keep future PCP and GI appointments   Patient will continue to monitor blood pressure and pulse oximeter daily and will seek medical help with increase SOB or any other increased symptoms of concern   Care Coordinator will follow up monthly    [facility] Sw[facility]  RN Care Coordinator   [facility] / [facility] -[place]. \[facility].\""   Phone: [phone]  Email :  [email or web address]

## 2020-12-21 ENCOUNTER — TELEPHONE (OUTPATIENT)
Dept: FAMILY MEDICINE | Facility: CLINIC | Age: 85
End: 2020-12-21

## 2020-12-21 ENCOUNTER — OFFICE VISIT (OUTPATIENT)
Dept: FAMILY MEDICINE | Facility: CLINIC | Age: 85
End: 2020-12-21
Payer: COMMERCIAL

## 2020-12-21 VITALS
HEART RATE: 74 BPM | SYSTOLIC BLOOD PRESSURE: 124 MMHG | DIASTOLIC BLOOD PRESSURE: 63 MMHG | WEIGHT: 169.1 LBS | HEIGHT: 70 IN | BODY MASS INDEX: 24.21 KG/M2 | TEMPERATURE: 95.7 F | OXYGEN SATURATION: 92 %

## 2020-12-21 DIAGNOSIS — I10 ESSENTIAL HYPERTENSION WITH GOAL BLOOD PRESSURE LESS THAN 140/90: ICD-10-CM

## 2020-12-21 DIAGNOSIS — K92.2 GASTROINTESTINAL HEMORRHAGE, UNSPECIFIED GASTROINTESTINAL HEMORRHAGE TYPE: ICD-10-CM

## 2020-12-21 DIAGNOSIS — M17.11 PRIMARY OSTEOARTHRITIS OF RIGHT KNEE: Primary | ICD-10-CM

## 2020-12-21 DIAGNOSIS — D50.9 IRON DEFICIENCY ANEMIA, UNSPECIFIED IRON DEFICIENCY ANEMIA TYPE: ICD-10-CM

## 2020-12-21 PROCEDURE — 99214 OFFICE O/P EST MOD 30 MIN: CPT | Mod: 25 | Performed by: INTERNAL MEDICINE

## 2020-12-21 PROCEDURE — 20610 DRAIN/INJ JOINT/BURSA W/O US: CPT | Mod: RT | Performed by: INTERNAL MEDICINE

## 2020-12-21 RX ORDER — TRIAMCINOLONE ACETONIDE 40 MG/ML
40 INJECTION, SUSPENSION INTRA-ARTICULAR; INTRAMUSCULAR ONCE
Status: DISCONTINUED | OUTPATIENT
Start: 2020-12-21 | End: 2021-04-16

## 2020-12-21 ASSESSMENT — MIFFLIN-ST. JEOR: SCORE: 1443.28

## 2020-12-21 NOTE — TELEPHONE ENCOUNTER
Called and spoke to patient - will come at 3:15 pm for knee inj.     Keisha Todd MA on 12/21/2020 at 1:32 PM

## 2020-12-21 NOTE — PROGRESS NOTES
"Subjective     Hermilo Velasquez is a 88 year old male who presents to clinic today for the following health issues:    HPI         Chief Complaint   Patient presents with     Imm/Inj     Knee inj       Hermilo is an 88-year-old man with multiple problems including iron deficiency anemia from chronic gastrointestinal blood loss, history of pulmonary embolism, history of atrial fibrillation, history of non-Hodgkin's lymphoma, history of lung cancer status post lobectomy, hypertension with recent low blood pressure readings, COPD, chronic kidney disease.  He lives independently.  He also has bilateral knee osteoarthritis.  He had a knee replacement of his left knee many years ago.  He has been having more pain in his right knee over the last several months.  He had a cortisone injection many months ago that provided him with about 5 to 6 months of symptomatic relief.  He requests a cortisone injection today.  Also of note, he recently had some low blood pressure readings at home.  These have been improving.  He no longer has any dizziness or lightheadedness.  He wished to know whether his home blood pressure cuff was accurate?  His most recent stools have been brown and his hemoglobin is improving.    Review of Systems   Pertinent +/-'s in HPI       Objective    /63 (BP Location: Right arm, Patient Position: Sitting, Cuff Size: Adult Regular)   Pulse 74   Temp 95.7  F (35.4  C) (Temporal)   Ht 1.778 m (5' 10\")   Wt 76.7 kg (169 lb 1.6 oz)   SpO2 92%   BMI 24.26 kg/m    Body mass index is 24.26 kg/m .  Physical Exam   We compared his home blood pressure cuff to our manual cuff and found that the readings correlated accurately.  General: He appears improved in general appearance from previous exams.  Knee: There is no obvious right knee effusion, there is moderate to severe right knee joint line tenderness, there is a small Baker's cyst in the right knee.  There is full range of motion.            Assessment " & Plan     Primary osteoarthritis of right knee    I think that performing another knee cortisone injection is indicated because he is not a great candidate for knee arthroplasty given his comorbid medical conditions.  We discussed the risks and benefits of the procedure today.    After discussion of risks and benefits of the procedure including bleeding and infection, verbal informed consent was obtained.  Area prepped and draped in sterile fashion.  Local anesthesia was obtained with 1% lidocaine.   The suprapatellar pouch was entered using a lateral approach.  1cc of K40 was injected into the joint space.  The procedure was tolerated well and after care was discussed.        - Drain/Inject Large Joint/Bursa  (Shoulder, Knee)  - triamcinolone (KENALOG-40) injection 40 mg    Gastrointestinal hemorrhage, unspecified gastrointestinal hemorrhage type  This seems to be stabilizing after the procedures performed by gastroenterology recently.  His most recent stools have been brown.  His hemoglobin is trending upward.  Clinically, overall, he appears to be much improved.    Iron deficiency anemia, unspecified iron deficiency anemia type  Continue oral iron supplementation    Essential hypertension with goal blood pressure less than 140/90  Seemingly under good control.  Also, he can rely on his home blood pressure cuff for accurate home readings as it seems to correlate with readings on our manual cuff in clinic.              Return in about 3 months (around 3/21/2021) for recheck w hemoglobin.  Patient instructed to return to clinic or contact us sooner if symptoms worsen or new symptoms develop.     Karson Bishop MD  [facility]

## 2020-12-21 NOTE — TELEPHONE ENCOUNTER
Pt called saying he accidentally missed his appointment/had time wrong.  Was wondering if any options to have work in, as does need to see Dr Bishop for Knee injection    Call back number:     [phone]    Ok to leave detailed message.  Dione Mcgregor RN  [facility] RN Triage Team

## 2020-12-23 DIAGNOSIS — D50.0 IRON DEFICIENCY ANEMIA DUE TO CHRONIC BLOOD LOSS: ICD-10-CM

## 2020-12-23 RX ORDER — FAMOTIDINE 40 MG/1
40 TABLET, FILM COATED ORAL 2 TIMES DAILY
Qty: 60 TABLET | Refills: 10 | Status: SHIPPED | OUTPATIENT
Start: 2020-12-23 | End: 2022-01-07

## 2020-12-28 ENCOUNTER — TRANSFERRED RECORDS (OUTPATIENT)
Dept: HEALTH INFORMATION MANAGEMENT | Facility: CLINIC | Age: 85
End: 2020-12-28

## 2021-01-19 ENCOUNTER — TRANSFERRED RECORDS (OUTPATIENT)
Dept: HEALTH INFORMATION MANAGEMENT | Facility: CLINIC | Age: 86
End: 2021-01-19

## 2021-01-21 ENCOUNTER — OFFICE VISIT (OUTPATIENT)
Dept: FAMILY MEDICINE | Facility: CLINIC | Age: 86
End: 2021-01-21
Payer: COMMERCIAL

## 2021-01-21 VITALS
HEART RATE: 70 BPM | DIASTOLIC BLOOD PRESSURE: 60 MMHG | WEIGHT: 164 LBS | OXYGEN SATURATION: 95 % | SYSTOLIC BLOOD PRESSURE: 127 MMHG | BODY MASS INDEX: 23.48 KG/M2 | HEIGHT: 70 IN | TEMPERATURE: 98.2 F

## 2021-01-21 DIAGNOSIS — Z87.19 H/O: GI BLEED: ICD-10-CM

## 2021-01-21 DIAGNOSIS — D50.0 IRON DEFICIENCY ANEMIA DUE TO CHRONIC BLOOD LOSS: Primary | ICD-10-CM

## 2021-01-21 LAB — HGB BLD-MCNC: 8.7 G/DL (ref 13.3–17.7)

## 2021-01-21 PROCEDURE — 36415 COLL VENOUS BLD VENIPUNCTURE: CPT | Performed by: NURSE PRACTITIONER

## 2021-01-21 PROCEDURE — 85018 HEMOGLOBIN: CPT | Performed by: NURSE PRACTITIONER

## 2021-01-21 PROCEDURE — 99213 OFFICE O/P EST LOW 20 MIN: CPT | Performed by: NURSE PRACTITIONER

## 2021-01-21 ASSESSMENT — MIFFLIN-ST. JEOR: SCORE: 1420.15

## 2021-01-21 NOTE — PROGRESS NOTES
"  Assessment & Plan     Iron deficiency anemia due to chronic blood loss  - Hemoglobin  Reassured Hgb is stable      Qiana Fernandes, ILYA CNP  [facility] RENETTA Akhtar is a 88 year old who presents to clinic today for the following health issues    HPI   Wants to have his hgb checked again  -checking BP at home and reports the lowest reading was 100/42. Has been feeling dizzy off and on which he attributes to gabapentin which he takes for neuropathy  Had colonoscopy in Oct and had 6 recently bleeding lesions ablated; also polypectomy  Seen by PCP in November and hgb trending up with normotensive BP reading  He has not seen any blood in stools and no black stools, no abd pain , no N/V  He is taking iron supplement daily and would like to know his current hgb.   On long term anticoagualtion; hx porcine AVR 5 years ago  No SOB or new exercise tolerance     Review of Systems   Constitutional, HEENT, cardiovascular, pulmonary, gi and gu systems are negative, except as otherwise noted.      Objective    /60 (BP Location: Right arm, Patient Position: Sitting)   Pulse 70   Temp 98.2  F (36.8  C) (Temporal)   Ht 1.778 m (5' 10\")   Wt 74.4 kg (164 lb)   SpO2 95%   BMI 23.53 kg/m    Body mass index is 23.53 kg/m .  Physical Exam   GENERAL: healthy, alert and no distress, seems energetic and color good  EYES: Eyes grossly normal to inspection, PERRL and conjunctivae and sclerae normal  NECK: no adenopathy, no asymmetry, masses, or scars and thyroid normal to palpation  RESP: lungs clear to auscultation - no rales, rhonchi or wheezes  CV: regular rate and rhythm, normal S1 S2, no S3 or S4, no murmur, click or rub, no peripheral edema and peripheral pulses strong  ABDOMEN: soft, nontender, no hepatosplenomegaly, no masses and bowel sounds normal  MS: no gross musculoskeletal defects noted, no edema    Results for orders placed or performed in visit on 01/21/21   Hemoglobin     Status: " Abnormal   Result Value Ref Range    Hemoglobin 8.7 (L) 13.3 - 17.7 g/dL

## 2021-01-21 NOTE — LETTER
January 22, 2021      Hermilo [facility]  [address] [facility] [address] [address]        Dear ,    As we discussed yesterday, Hermilo , the hemoglobin is stable.  You should continue taking the iron supplement every other day with a little bit of orange juice.       Resulted Orders   Hemoglobin   Result Value Ref Range    Hemoglobin 8.7 (L) 13.3 - 17.7 g/dL       If you have any questions or concerns, please call the clinic at the number listed above.       Sincerely,      ILYA Ceja CNP    elizabeth

## 2021-01-22 NOTE — RESULT ENCOUNTER NOTE
As we discussed yesterday, Hermilo , the hemoglobin is stable.  You should continue taking the iron supplement every other day with a little bit of orange juice.

## 2021-01-22 NOTE — RESULT ENCOUNTER NOTE
Letter and lab results mailed to patient.      Sophia Rivera MA     NURSING DISCHARGE NOTE    Discharged Home via Wheelchair. Accompanied by Support staff  Belongings Taken by patient/family. IV dc'd with catheter intact. Tele discontinued. Pt denies CP, SOB, dizziness, numbness/tingling. Pt denies calf tenderness.

## 2021-02-01 ENCOUNTER — TELEPHONE (OUTPATIENT)
Dept: CARDIOLOGY | Facility: CLINIC | Age: 86
End: 2021-02-01

## 2021-02-01 DIAGNOSIS — E78.2 MIXED HYPERLIPIDEMIA: ICD-10-CM

## 2021-02-01 RX ORDER — ATORVASTATIN CALCIUM 10 MG/1
10 TABLET, FILM COATED ORAL DAILY
Qty: 90 TABLET | Refills: 1 | Status: SHIPPED | OUTPATIENT
Start: 2021-02-01 | End: 2021-09-24

## 2021-02-11 ENCOUNTER — PATIENT OUTREACH (OUTPATIENT)
Dept: CARE COORDINATION | Facility: CLINIC | Age: 86
End: 2021-02-11

## 2021-02-11 ASSESSMENT — ACTIVITIES OF DAILY LIVING (ADL): DEPENDENT_IADLS:: INDEPENDENT

## 2021-02-11 NOTE — LETTER
[facility] CARE COORDINATION  [facility]  [address], [place] [address]    February 23, 2021    Hermilo Velasquez  [address]  [facility] [address]      Dear Hermilo,    I have been unsuccessful in reaching you since our last contact. At this time the Care Coordination team will make no further attempts to reach you, however this does not change your ability to continue receiving care from your providers at your primary care clinic. If you need additional support from a care coordinator in the future please contact Bree Read, Community Health Worker, at [phone].    All of us at the [facility] are invested in your health and are here to assist you in meeting your goals.     Sincerely,    Cristhian Mendez, RN  Clinic Care Coordinator  [facility]  [facility]ristin Chery, [facility]  Ph: [phone]

## 2021-02-11 NOTE — PROGRESS NOTES
"Clinic Care Coordination Contact  OUTREACH by patient on 6/4/19    Referral Information: IP Report    Chief Complaint   Patient presents with     Clinic Care Coordination - Follow-up     DME     Clinical Concerns:  Current Medical Concerns:  Patient contacted writer to request assistance with the knee brace purchase.  The pharmacy he usually goes to does not carry the product.  Patient also stated he will be seeing Dr. Bishop on 6/5/19 at the clinic and will bring the prescription/order with him.     Financial/Insurance: [employer]ARE      Resources:  Current Resources: [id number] - [place], [place] - [address]    Interventions:  Writer informed patient that other locations of Durable Medical Equipment will be available for him when he comes to see PCP tomorrow.      Goals        General    1. Medical (pt-stated)     Notes - Note edited  5/21/2019 12:14 PM by Charisse Carrero RN    Goal Statement: \"I need help getting a brace for my knee.\"  Measure of Success: Patient will have a knee brace to use within a month.  Supportive Steps to Achieve:     RN Care Coordinator will collaborate with PCP to have an order in place.    RN Care Coordinator will provide resources to patient.    Patient will obtain the durable medical equipment.    RN CC will assist patient throughout along the way, as needed.  Barriers: None.  Strengths: Patient is pro-active.  Date to Achieve By: 6/21/2019  Patient expressed understanding of goal: Yes.            As of today's date 6/4/2019 goal is met at 26 - 50%.   Goal Status:  Ongoing      Plan: RN Care Coordinator will obtain and print information on three (3) other DME providers, with phone numbers and addresses, to hand in to the patient on 6/5/19.    Charisse Carrero RN Care Coordinator  [facility]  Phone:  [phone] (Mondays, Wednesdays & Fridays)  Phone:  [phone] (Tuesdays & Thursdays)  Email: [email or web address]       "
full weight-bearing

## 2021-02-11 NOTE — PROGRESS NOTES
Clinic Care Coordination Contact  [facility]/Voicemail    Referral Source: IP Report  Clinical Data: Care Coordinator Outreach  CC RN outreach to get updates on patient status, assess goal progress, and provide support and additional resources as needed.    Outreach attempted x 1.  Left message on patient's voicemail with call back information and requested return call.    Plan: Care Coordinator will try to reach patient again in 3-5 business days.    Cristhian Mendez RN  Clinic Care Coordinator  [facility], [place]  Ph: [phone]

## 2021-02-22 ENCOUNTER — OFFICE VISIT (OUTPATIENT)
Dept: FAMILY MEDICINE | Facility: CLINIC | Age: 86
End: 2021-02-22
Payer: COMMERCIAL

## 2021-02-22 VITALS
OXYGEN SATURATION: 94 % | BODY MASS INDEX: 23.05 KG/M2 | SYSTOLIC BLOOD PRESSURE: 129 MMHG | HEIGHT: 70 IN | HEART RATE: 99 BPM | WEIGHT: 161 LBS | TEMPERATURE: 98.2 F | DIASTOLIC BLOOD PRESSURE: 67 MMHG

## 2021-02-22 DIAGNOSIS — H61.22 IMPACTED CERUMEN OF LEFT EAR: Primary | ICD-10-CM

## 2021-02-22 PROCEDURE — 69209 REMOVE IMPACTED EAR WAX UNI: CPT | Mod: LT | Performed by: NURSE PRACTITIONER

## 2021-02-22 ASSESSMENT — MIFFLIN-ST. JEOR: SCORE: 1406.54

## 2021-02-22 NOTE — PROGRESS NOTES
"  Assessment & Plan     Impacted cerumen of left ear    ILYA Ceja CNP  [facility] RENET[facility] Akhtar is a 88 year old who presents for the following health issues      HPI   Plugged Ears; Hermilo has been experiencing decreased hearing and went to audiology.  He was told he needed to have cerumen removed from his left ear canal    He denies ear pain    Review of Systems   Constitutional, HEENT, cardiovascular, pulmonary, gi and gu systems are negative, except as otherwise noted.      Objective    /67 (BP Location: Left arm, Patient Position: Chair, Cuff Size: Adult Regular)   Pulse 99   Temp 98.2  F (36.8  C) (Tympanic)   Ht 1.778 m (5' 10\")   Wt 73 kg (161 lb)   SpO2 94%   BMI 23.10 kg/m    Body mass index is 23.1 kg/m .  Physical Exam   GENERAL: healthy, alert and no distress  EYES: Eyes grossly normal to inspection, PERRL and conjunctivae and sclerae normal  HENT: right ear canal is clear and left canal is cerumen occluded , lavaged by MA and TM's normal, nose and mouth without ulcers or lesions  PSYCH: mentation appears normal, affect normal/bright                "

## 2021-02-23 ASSESSMENT — ACTIVITIES OF DAILY LIVING (ADL): DEPENDENT_IADLS:: INDEPENDENT

## 2021-02-23 NOTE — PROGRESS NOTES
Clinic Care Coordination Contact  [facility]/Voicemail    Referral Source: IP Report  Clinical Data: Care Coordinator Outreach  CC RN outreach to get updates on patient status, assess goal progress, and provide support and additional resources as needed.    Outreach attempted x 2.  Left message on patient's voicemail with call back information and requested return call.    Plan: Care Coordinator will send disenrollment letter with care coordinator contact information via mail. Care Coordinator will do no further outreaches at this time.    Cristhian Mendez, RN  Clinic Care Coordinator  [facility]  [facility]  Ph: [phone]

## 2021-02-24 NOTE — PROGRESS NOTES
Patient identified using two patient identifiers.  Ear exam showing wax occlusion completed by provider.  Solution: warm water was placed in the left ear(s) via Syringe.  Kusum Sandoval CMA

## 2021-03-05 ENCOUNTER — TRANSFERRED RECORDS (OUTPATIENT)
Dept: HEALTH INFORMATION MANAGEMENT | Facility: CLINIC | Age: 86
End: 2021-03-05

## 2021-03-08 DIAGNOSIS — G62.9 NEUROPATHY: ICD-10-CM

## 2021-03-08 RX ORDER — DULOXETIN HYDROCHLORIDE 30 MG/1
CAPSULE, DELAYED RELEASE ORAL
Qty: 60 CAPSULE | Refills: 2 | Status: SHIPPED | OUTPATIENT
Start: 2021-03-08 | End: 2021-04-13

## 2021-03-10 ENCOUNTER — TELEPHONE (OUTPATIENT)
Dept: FAMILY MEDICINE | Facility: CLINIC | Age: 86
End: 2021-03-10

## 2021-03-10 NOTE — LETTER
March 10, 2021      Hermilo Velasquez  [address]  [place] [facility] [address]        Dear Dr. Edna Akhtar has recommended you schedule a Medication Therapy Management (MTM) appointment. MTM is designed to help you get the most of out of your medicines.     During an MTM appointment a specially trained pharmacist will review all of your medicines, both prescription and over-the-counter. They will make sure your medicines are the best choice for you and are safe and convenient for you.  MTM pharmacists work together with you and your doctor to help you understand your medicines, solve any problems related to your medicines and help you get the best results from taking your medicines.     At [facility], we strongly believe in a team approach to health care. We want to help you understand your medicines and health conditions. To learn more about how you might benefit from MTM services, watch the patient video at [email or web address].     To make an appointment, please call the clinic at [phone] or the MTM scheduling line at [phone] (toll-free at [phone]).    We look forward to hearing from you!        Adriane Blanc PharmD, [facility]  Medication Therapy Management Provider  Pager: [phone]     Salina Hernadez PharmD  Medication Therapy Management Resident  Pager: [phone]

## 2021-03-10 NOTE — TELEPHONE ENCOUNTER
Sent referral letter.    Emeli MotleyD, [facility]  Medication Therapy Management Provider  Pager: [phone]

## 2021-03-10 NOTE — TELEPHONE ENCOUNTER
MTM referral from: [facility] visit (referral by provider)    MTM referral outreach attempt #2 on March 10, 2021 at 1:52 PM      Outcome: Patient not reachable after several attempts, will route to MTM Pharmacist/Provider as an FYI. Thank you for the referral.    Ronni Hawley, MTM coordinator

## 2021-03-23 ENCOUNTER — TELEPHONE (OUTPATIENT)
Dept: PHARMACY | Facility: CLINIC | Age: 86
End: 2021-03-23

## 2021-03-23 NOTE — TELEPHONE ENCOUNTER
Patient was scheduled for [facility] visit at 11am today via telephone.  I called him at 11am and again at 11:13 AM - there was no answer either time.  I left him a VM asking him to call the clinic to re-schedule.    Emeli MotleyD, [facility]  Medication Therapy Management Provider  Pager: [phone]

## 2021-04-09 ENCOUNTER — TELEPHONE (OUTPATIENT)
Dept: FAMILY MEDICINE | Facility: CLINIC | Age: 86
End: 2021-04-09

## 2021-04-09 NOTE — TELEPHONE ENCOUNTER
Reason for Call:  Medication or medication refill:    Do you use a [facility]?  Name of the pharmacy and phone number for the current request:  [facility]    Name of the medication requested: portable O2 concentrator     Other request: Pt states it makes it's own O2, has wheels, but is not a tank. His previous one broke and he has no back up    Can we leave a detailed message on this number? YES    Phone number patient can be reached at: Home number on file [phone] (home)    Best Time: any    Call taken on 4/9/2021 at 3:00 PM by Candace Hernandez

## 2021-04-13 ENCOUNTER — HOSPITAL ENCOUNTER (INPATIENT)
Facility: CLINIC | Age: 86
LOS: 3 days | Discharge: HOME HEALTH | DRG: 871 | End: 2021-04-16
Attending: EMERGENCY MEDICINE | Admitting: HOSPITALIST
Payer: COMMERCIAL

## 2021-04-13 ENCOUNTER — APPOINTMENT (OUTPATIENT)
Dept: GENERAL RADIOLOGY | Facility: CLINIC | Age: 86
DRG: 871 | End: 2021-04-13
Attending: EMERGENCY MEDICINE
Payer: COMMERCIAL

## 2021-04-13 ENCOUNTER — NURSE TRIAGE (OUTPATIENT)
Dept: NURSING | Facility: CLINIC | Age: 86
End: 2021-04-13

## 2021-04-13 DIAGNOSIS — R79.89 ELEVATED TROPONIN: ICD-10-CM

## 2021-04-13 DIAGNOSIS — J18.9 PNEUMONIA OF LEFT LOWER LOBE DUE TO INFECTIOUS ORGANISM: ICD-10-CM

## 2021-04-13 DIAGNOSIS — N28.9 RENAL INSUFFICIENCY: ICD-10-CM

## 2021-04-13 DIAGNOSIS — J44.1 COPD EXACERBATION (H): Primary | ICD-10-CM

## 2021-04-13 DIAGNOSIS — J44.0 CHRONIC OBSTRUCTIVE PULMONARY DISEASE WITH ACUTE LOWER RESPIRATORY INFECTION (H): ICD-10-CM

## 2021-04-13 DIAGNOSIS — D64.9 ANEMIA, UNSPECIFIED TYPE: ICD-10-CM

## 2021-04-13 LAB
ALBUMIN SERPL-MCNC: 2.6 G/DL (ref 3.4–5)
ALP SERPL-CCNC: 82 U/L (ref 40–150)
ALT SERPL W P-5'-P-CCNC: 10 U/L (ref 0–70)
ANION GAP SERPL CALCULATED.3IONS-SCNC: 4 MMOL/L (ref 3–14)
AST SERPL W P-5'-P-CCNC: 10 U/L (ref 0–45)
BASOPHILS # BLD AUTO: 0 10E9/L (ref 0–0.2)
BASOPHILS NFR BLD AUTO: 0.2 %
BILIRUB SERPL-MCNC: 0.5 MG/DL (ref 0.2–1.3)
BUN SERPL-MCNC: 21 MG/DL (ref 7–30)
CALCIUM SERPL-MCNC: 8 MG/DL (ref 8.5–10.1)
CHLORIDE SERPL-SCNC: 111 MMOL/L (ref 94–109)
CO2 SERPL-SCNC: 26 MMOL/L (ref 20–32)
CREAT SERPL-MCNC: 1.6 MG/DL (ref 0.66–1.25)
DIFFERENTIAL METHOD BLD: ABNORMAL
EOSINOPHIL # BLD AUTO: 0 10E9/L (ref 0–0.7)
EOSINOPHIL NFR BLD AUTO: 0.2 %
ERYTHROCYTE [DISTWIDTH] IN BLOOD BY AUTOMATED COUNT: 15.1 % (ref 10–15)
FLUAV RNA RESP QL NAA+PROBE: NEGATIVE
FLUBV RNA RESP QL NAA+PROBE: NEGATIVE
GFR SERPL CREATININE-BSD FRML MDRD: 38 ML/MIN/{1.73_M2}
GLUCOSE SERPL-MCNC: 130 MG/DL (ref 70–99)
HCT VFR BLD AUTO: 25.3 % (ref 40–53)
HGB BLD-MCNC: 7 G/DL (ref 13.3–17.7)
IMM GRANULOCYTES # BLD: 0.1 10E9/L (ref 0–0.4)
IMM GRANULOCYTES NFR BLD: 0.4 %
INTERPRETATION ECG - MUSE: NORMAL
LABORATORY COMMENT REPORT: NORMAL
LACTATE BLD-SCNC: 1.1 MMOL/L (ref 0.7–2)
LACTATE BLD-SCNC: 2.6 MMOL/L (ref 0.7–2)
LYMPHOCYTES # BLD AUTO: 0.8 10E9/L (ref 0.8–5.3)
LYMPHOCYTES NFR BLD AUTO: 3.4 %
MCH RBC QN AUTO: 22.4 PG (ref 26.5–33)
MCHC RBC AUTO-ENTMCNC: 27.7 G/DL (ref 31.5–36.5)
MCV RBC AUTO: 81 FL (ref 78–100)
MONOCYTES # BLD AUTO: 1.7 10E9/L (ref 0–1.3)
MONOCYTES NFR BLD AUTO: 6.8 %
NEUTROPHILS # BLD AUTO: 21.9 10E9/L (ref 1.6–8.3)
NEUTROPHILS NFR BLD AUTO: 89 %
NRBC # BLD AUTO: 0 10*3/UL
NRBC BLD AUTO-RTO: 0 /100
PLATELET # BLD AUTO: 290 10E9/L (ref 150–450)
POTASSIUM SERPL-SCNC: 4.3 MMOL/L (ref 3.4–5.3)
PROCALCITONIN SERPL-MCNC: 2.3 NG/ML
PROT SERPL-MCNC: 6.1 G/DL (ref 6.8–8.8)
RBC # BLD AUTO: 3.12 10E12/L (ref 4.4–5.9)
RSV RNA SPEC QL NAA+PROBE: NORMAL
SARS-COV-2 RNA RESP QL NAA+PROBE: NEGATIVE
SODIUM SERPL-SCNC: 141 MMOL/L (ref 133–144)
SPECIMEN SOURCE: NORMAL
TROPONIN I SERPL-MCNC: 0.33 UG/L (ref 0–0.04)
WBC # BLD AUTO: 24.6 10E9/L (ref 4–11)

## 2021-04-13 PROCEDURE — 36415 COLL VENOUS BLD VENIPUNCTURE: CPT

## 2021-04-13 PROCEDURE — C9803 HOPD COVID-19 SPEC COLLECT: HCPCS

## 2021-04-13 PROCEDURE — 84484 ASSAY OF TROPONIN QUANT: CPT | Performed by: EMERGENCY MEDICINE

## 2021-04-13 PROCEDURE — 120N000001 HC R&B MED SURG/OB

## 2021-04-13 PROCEDURE — 83605 ASSAY OF LACTIC ACID: CPT | Performed by: EMERGENCY MEDICINE

## 2021-04-13 PROCEDURE — 99291 CRITICAL CARE FIRST HOUR: CPT | Mod: 25

## 2021-04-13 PROCEDURE — 87040 BLOOD CULTURE FOR BACTERIA: CPT | Performed by: EMERGENCY MEDICINE

## 2021-04-13 PROCEDURE — 99292 CRITICAL CARE ADDL 30 MIN: CPT

## 2021-04-13 PROCEDURE — 87636 SARSCOV2 & INF A&B AMP PRB: CPT | Performed by: EMERGENCY MEDICINE

## 2021-04-13 PROCEDURE — 96367 TX/PROPH/DG ADDL SEQ IV INF: CPT

## 2021-04-13 PROCEDURE — 258N000003 HC RX IP 258 OP 636: Performed by: EMERGENCY MEDICINE

## 2021-04-13 PROCEDURE — 85025 COMPLETE CBC W/AUTO DIFF WBC: CPT | Performed by: EMERGENCY MEDICINE

## 2021-04-13 PROCEDURE — 80053 COMPREHEN METABOLIC PANEL: CPT | Performed by: EMERGENCY MEDICINE

## 2021-04-13 PROCEDURE — 71045 X-RAY EXAM CHEST 1 VIEW: CPT

## 2021-04-13 PROCEDURE — 96365 THER/PROPH/DIAG IV INF INIT: CPT

## 2021-04-13 PROCEDURE — 93005 ELECTROCARDIOGRAM TRACING: CPT

## 2021-04-13 PROCEDURE — 99223 1ST HOSP IP/OBS HIGH 75: CPT | Mod: AI | Performed by: HOSPITALIST

## 2021-04-13 PROCEDURE — 84145 PROCALCITONIN (PCT): CPT | Performed by: EMERGENCY MEDICINE

## 2021-04-13 PROCEDURE — 250N000011 HC RX IP 250 OP 636: Performed by: EMERGENCY MEDICINE

## 2021-04-13 RX ORDER — SODIUM CHLORIDE 9 MG/ML
INJECTION, SOLUTION INTRAVENOUS CONTINUOUS
Status: DISCONTINUED | OUTPATIENT
Start: 2021-04-13 | End: 2021-04-14

## 2021-04-13 RX ORDER — GABAPENTIN 300 MG/1
600 CAPSULE ORAL AT BEDTIME
Status: ON HOLD | COMMUNITY
End: 2022-09-08

## 2021-04-13 RX ORDER — AZITHROMYCIN 500 MG/1
500 INJECTION, POWDER, LYOPHILIZED, FOR SOLUTION INTRAVENOUS ONCE
Status: COMPLETED | OUTPATIENT
Start: 2021-04-13 | End: 2021-04-13

## 2021-04-13 RX ORDER — LIDOCAINE 40 MG/G
CREAM TOPICAL
Status: CANCELLED | OUTPATIENT
Start: 2021-04-13

## 2021-04-13 RX ORDER — TAMSULOSIN HYDROCHLORIDE 0.4 MG/1
0.4 CAPSULE ORAL DAILY
COMMUNITY
End: 2021-07-14

## 2021-04-13 RX ORDER — DULOXETIN HYDROCHLORIDE 60 MG/1
60 CAPSULE, DELAYED RELEASE ORAL DAILY
COMMUNITY
End: 2022-08-23

## 2021-04-13 RX ADMIN — AZITHROMYCIN MONOHYDRATE 500 MG: 500 INJECTION, POWDER, LYOPHILIZED, FOR SOLUTION INTRAVENOUS at 22:28

## 2021-04-13 RX ADMIN — SODIUM CHLORIDE: 9 INJECTION, SOLUTION INTRAVENOUS at 23:26

## 2021-04-13 RX ADMIN — CEFEPIME HYDROCHLORIDE 2 G: 2 INJECTION, POWDER, FOR SOLUTION INTRAVENOUS at 21:54

## 2021-04-13 RX ADMIN — SODIUM CHLORIDE 2325 ML: 9 INJECTION, SOLUTION INTRAVENOUS at 21:32

## 2021-04-13 ASSESSMENT — ENCOUNTER SYMPTOMS
SHORTNESS OF BREATH: 1
DIARRHEA: 0
MYALGIAS: 0
VOMITING: 0

## 2021-04-13 NOTE — TELEPHONE ENCOUNTER
ZAHRA     Spoke with patient     States to disregard request     He had his machine fixed    Yamilex EVERETT RN

## 2021-04-14 ENCOUNTER — APPOINTMENT (OUTPATIENT)
Dept: CARDIOLOGY | Facility: CLINIC | Age: 86
DRG: 871 | End: 2021-04-14
Attending: HOSPITALIST
Payer: COMMERCIAL

## 2021-04-14 LAB
ABO + RH BLD: NORMAL
ABO + RH BLD: NORMAL
ALBUMIN SERPL-MCNC: 2.1 G/DL (ref 3.4–5)
ALP SERPL-CCNC: 62 U/L (ref 40–150)
ALT SERPL W P-5'-P-CCNC: 8 U/L (ref 0–70)
ANION GAP SERPL CALCULATED.3IONS-SCNC: 3 MMOL/L (ref 3–14)
AST SERPL W P-5'-P-CCNC: 7 U/L (ref 0–45)
BACTERIA SPEC CULT: ABNORMAL
BASOPHILS # BLD AUTO: 0 10E9/L (ref 0–0.2)
BASOPHILS NFR BLD AUTO: 0.2 %
BILIRUB SERPL-MCNC: 0.4 MG/DL (ref 0.2–1.3)
BLD GP AB SCN SERPL QL: NORMAL
BLD PROD TYP BPU: NORMAL
BLD UNIT ID BPU: 0
BLD UNIT ID BPU: 0
BLOOD BANK CMNT PATIENT-IMP: NORMAL
BLOOD PRODUCT CODE: NORMAL
BLOOD PRODUCT CODE: NORMAL
BPU ID: NORMAL
BPU ID: NORMAL
BUN SERPL-MCNC: 24 MG/DL (ref 7–30)
CALCIUM SERPL-MCNC: 7 MG/DL (ref 8.5–10.1)
CHLORIDE SERPL-SCNC: 114 MMOL/L (ref 94–109)
CO2 SERPL-SCNC: 25 MMOL/L (ref 20–32)
CREAT SERPL-MCNC: 1.34 MG/DL (ref 0.66–1.25)
DIFFERENTIAL METHOD BLD: ABNORMAL
EOSINOPHIL # BLD AUTO: 0.2 10E9/L (ref 0–0.7)
EOSINOPHIL NFR BLD AUTO: 1.1 %
ERYTHROCYTE [DISTWIDTH] IN BLOOD BY AUTOMATED COUNT: 15.1 % (ref 10–15)
ERYTHROCYTE [DISTWIDTH] IN BLOOD BY AUTOMATED COUNT: 15.2 % (ref 10–15)
FERRITIN SERPL-MCNC: 18 NG/ML (ref 26–388)
GFR SERPL CREATININE-BSD FRML MDRD: 47 ML/MIN/{1.73_M2}
GLUCOSE SERPL-MCNC: 113 MG/DL (ref 70–99)
GRAM STN SPEC: ABNORMAL
HCT VFR BLD AUTO: 20.6 % (ref 40–53)
HCT VFR BLD AUTO: 21.2 % (ref 40–53)
HGB BLD-MCNC: 5.6 G/DL (ref 13.3–17.7)
HGB BLD-MCNC: 5.8 G/DL (ref 13.3–17.7)
HGB BLD-MCNC: 8 G/DL (ref 13.3–17.7)
IMM GRANULOCYTES # BLD: 0.1 10E9/L (ref 0–0.4)
IMM GRANULOCYTES NFR BLD: 0.5 %
IRON SATN MFR SERPL: 2 % (ref 15–46)
IRON SERPL-MCNC: 6 UG/DL (ref 35–180)
LYMPHOCYTES # BLD AUTO: 0.8 10E9/L (ref 0.8–5.3)
LYMPHOCYTES NFR BLD AUTO: 4.7 %
Lab: ABNORMAL
Lab: ABNORMAL
MCH RBC QN AUTO: 22.3 PG (ref 26.5–33)
MCH RBC QN AUTO: 22.3 PG (ref 26.5–33)
MCHC RBC AUTO-ENTMCNC: 27.2 G/DL (ref 31.5–36.5)
MCHC RBC AUTO-ENTMCNC: 27.4 G/DL (ref 31.5–36.5)
MCV RBC AUTO: 82 FL (ref 78–100)
MCV RBC AUTO: 82 FL (ref 78–100)
MONOCYTES # BLD AUTO: 1.2 10E9/L (ref 0–1.3)
MONOCYTES NFR BLD AUTO: 6.7 %
NEUTROPHILS # BLD AUTO: 15.2 10E9/L (ref 1.6–8.3)
NEUTROPHILS NFR BLD AUTO: 86.8 %
NRBC # BLD AUTO: 0 10*3/UL
NRBC BLD AUTO-RTO: 0 /100
NT-PROBNP SERPL-MCNC: 6429 PG/ML (ref 0–1800)
NUM BPU REQUESTED: 2
PLATELET # BLD AUTO: 224 10E9/L (ref 150–450)
PLATELET # BLD AUTO: 244 10E9/L (ref 150–450)
POTASSIUM SERPL-SCNC: 4.2 MMOL/L (ref 3.4–5.3)
PROCALCITONIN SERPL-MCNC: 1.63 NG/ML
PROT SERPL-MCNC: 4.8 G/DL (ref 6.8–8.8)
RBC # BLD AUTO: 2.51 10E12/L (ref 4.4–5.9)
RBC # BLD AUTO: 2.6 10E12/L (ref 4.4–5.9)
SODIUM SERPL-SCNC: 142 MMOL/L (ref 133–144)
SPECIMEN EXP DATE BLD: NORMAL
SPECIMEN SOURCE: ABNORMAL
SPECIMEN SOURCE: ABNORMAL
TIBC SERPL-MCNC: 298 UG/DL (ref 240–430)
TRANSFUSION STATUS PATIENT QL: NORMAL
TROPONIN I SERPL-MCNC: 0.51 UG/L (ref 0–0.04)
TROPONIN I SERPL-MCNC: 0.53 UG/L (ref 0–0.04)
WBC # BLD AUTO: 17.3 10E9/L (ref 4–11)
WBC # BLD AUTO: 17.6 10E9/L (ref 4–11)

## 2021-04-14 PROCEDURE — 86923 COMPATIBILITY TEST ELECTRIC: CPT | Performed by: HOSPITALIST

## 2021-04-14 PROCEDURE — 83540 ASSAY OF IRON: CPT | Performed by: HOSPITALIST

## 2021-04-14 PROCEDURE — 250N000009 HC RX 250: Performed by: INTERNAL MEDICINE

## 2021-04-14 PROCEDURE — 258N000003 HC RX IP 258 OP 636: Performed by: HOSPITALIST

## 2021-04-14 PROCEDURE — 87070 CULTURE OTHR SPECIMN AEROBIC: CPT | Performed by: HOSPITALIST

## 2021-04-14 PROCEDURE — 85025 COMPLETE CBC W/AUTO DIFF WBC: CPT | Performed by: HOSPITALIST

## 2021-04-14 PROCEDURE — 85018 HEMOGLOBIN: CPT | Performed by: INTERNAL MEDICINE

## 2021-04-14 PROCEDURE — 80053 COMPREHEN METABOLIC PANEL: CPT | Performed by: HOSPITALIST

## 2021-04-14 PROCEDURE — 84145 PROCALCITONIN (PCT): CPT | Performed by: HOSPITALIST

## 2021-04-14 PROCEDURE — 85027 COMPLETE CBC AUTOMATED: CPT | Performed by: HOSPITALIST

## 2021-04-14 PROCEDURE — 86850 RBC ANTIBODY SCREEN: CPT | Performed by: HOSPITALIST

## 2021-04-14 PROCEDURE — 99222 1ST HOSP IP/OBS MODERATE 55: CPT | Mod: 25 | Performed by: INTERNAL MEDICINE

## 2021-04-14 PROCEDURE — 94640 AIRWAY INHALATION TREATMENT: CPT | Mod: 76

## 2021-04-14 PROCEDURE — 258N000003 HC RX IP 258 OP 636: Performed by: INTERNAL MEDICINE

## 2021-04-14 PROCEDURE — 250N000011 HC RX IP 250 OP 636: Performed by: INTERNAL MEDICINE

## 2021-04-14 PROCEDURE — 93306 TTE W/DOPPLER COMPLETE: CPT | Mod: 26 | Performed by: INTERNAL MEDICINE

## 2021-04-14 PROCEDURE — 250N000009 HC RX 250: Performed by: HOSPITALIST

## 2021-04-14 PROCEDURE — 83550 IRON BINDING TEST: CPT | Performed by: HOSPITALIST

## 2021-04-14 PROCEDURE — 84484 ASSAY OF TROPONIN QUANT: CPT | Performed by: HOSPITALIST

## 2021-04-14 PROCEDURE — 86901 BLOOD TYPING SEROLOGIC RH(D): CPT | Performed by: HOSPITALIST

## 2021-04-14 PROCEDURE — 99233 SBSQ HOSP IP/OBS HIGH 50: CPT | Performed by: INTERNAL MEDICINE

## 2021-04-14 PROCEDURE — 36415 COLL VENOUS BLD VENIPUNCTURE: CPT | Performed by: HOSPITALIST

## 2021-04-14 PROCEDURE — 250N000011 HC RX IP 250 OP 636: Performed by: HOSPITALIST

## 2021-04-14 PROCEDURE — 36415 COLL VENOUS BLD VENIPUNCTURE: CPT | Performed by: INTERNAL MEDICINE

## 2021-04-14 PROCEDURE — 250N000013 HC RX MED GY IP 250 OP 250 PS 637: Performed by: INTERNAL MEDICINE

## 2021-04-14 PROCEDURE — 93306 TTE W/DOPPLER COMPLETE: CPT

## 2021-04-14 PROCEDURE — 86900 BLOOD TYPING SEROLOGIC ABO: CPT | Performed by: HOSPITALIST

## 2021-04-14 PROCEDURE — 999N000157 HC STATISTIC RCP TIME EA 10 MIN

## 2021-04-14 PROCEDURE — 87205 SMEAR GRAM STAIN: CPT | Performed by: HOSPITALIST

## 2021-04-14 PROCEDURE — 83880 ASSAY OF NATRIURETIC PEPTIDE: CPT | Performed by: HOSPITALIST

## 2021-04-14 PROCEDURE — P9016 RBC LEUKOCYTES REDUCED: HCPCS | Performed by: HOSPITALIST

## 2021-04-14 PROCEDURE — 82728 ASSAY OF FERRITIN: CPT | Performed by: HOSPITALIST

## 2021-04-14 PROCEDURE — 120N000001 HC R&B MED SURG/OB

## 2021-04-14 PROCEDURE — 94640 AIRWAY INHALATION TREATMENT: CPT

## 2021-04-14 RX ORDER — LANOLIN ALCOHOL/MO/W.PET/CERES
3 CREAM (GRAM) TOPICAL
Status: DISCONTINUED | OUTPATIENT
Start: 2021-04-14 | End: 2021-04-16 | Stop reason: HOSPADM

## 2021-04-14 RX ORDER — AMOXICILLIN 250 MG
1 CAPSULE ORAL 2 TIMES DAILY PRN
Status: DISCONTINUED | OUTPATIENT
Start: 2021-04-14 | End: 2021-04-16 | Stop reason: HOSPADM

## 2021-04-14 RX ORDER — ONDANSETRON 2 MG/ML
4 INJECTION INTRAMUSCULAR; INTRAVENOUS EVERY 6 HOURS PRN
Status: DISCONTINUED | OUTPATIENT
Start: 2021-04-14 | End: 2021-04-16 | Stop reason: HOSPADM

## 2021-04-14 RX ORDER — ACETAMINOPHEN 650 MG/1
650 SUPPOSITORY RECTAL EVERY 4 HOURS PRN
Status: DISCONTINUED | OUTPATIENT
Start: 2021-04-14 | End: 2021-04-16 | Stop reason: HOSPADM

## 2021-04-14 RX ORDER — GABAPENTIN 300 MG/1
600 CAPSULE ORAL AT BEDTIME
Status: DISCONTINUED | OUTPATIENT
Start: 2021-04-14 | End: 2021-04-16 | Stop reason: HOSPADM

## 2021-04-14 RX ORDER — AMOXICILLIN 250 MG
2 CAPSULE ORAL 2 TIMES DAILY PRN
Status: DISCONTINUED | OUTPATIENT
Start: 2021-04-14 | End: 2021-04-16 | Stop reason: HOSPADM

## 2021-04-14 RX ORDER — IPRATROPIUM BROMIDE AND ALBUTEROL SULFATE 2.5; .5 MG/3ML; MG/3ML
3 SOLUTION RESPIRATORY (INHALATION)
Status: DISCONTINUED | OUTPATIENT
Start: 2021-04-14 | End: 2021-04-16 | Stop reason: HOSPADM

## 2021-04-14 RX ORDER — ATORVASTATIN CALCIUM 10 MG/1
10 TABLET, FILM COATED ORAL DAILY
Status: DISCONTINUED | OUTPATIENT
Start: 2021-04-14 | End: 2021-04-16 | Stop reason: HOSPADM

## 2021-04-14 RX ORDER — FAMOTIDINE 20 MG/1
40 TABLET, FILM COATED ORAL DAILY
Status: DISCONTINUED | OUTPATIENT
Start: 2021-04-15 | End: 2021-04-15

## 2021-04-14 RX ORDER — ONDANSETRON 4 MG/1
4 TABLET, ORALLY DISINTEGRATING ORAL EVERY 6 HOURS PRN
Status: DISCONTINUED | OUTPATIENT
Start: 2021-04-14 | End: 2021-04-16 | Stop reason: HOSPADM

## 2021-04-14 RX ORDER — ALBUTEROL SULFATE 0.83 MG/ML
3 SOLUTION RESPIRATORY (INHALATION)
Status: DISCONTINUED | OUTPATIENT
Start: 2021-04-14 | End: 2021-04-16 | Stop reason: HOSPADM

## 2021-04-14 RX ORDER — AZITHROMYCIN 500 MG/1
500 INJECTION, POWDER, LYOPHILIZED, FOR SOLUTION INTRAVENOUS EVERY 24 HOURS
Status: DISCONTINUED | OUTPATIENT
Start: 2021-04-14 | End: 2021-04-14

## 2021-04-14 RX ORDER — POLYETHYLENE GLYCOL 3350 17 G/17G
17 POWDER, FOR SOLUTION ORAL DAILY PRN
Status: DISCONTINUED | OUTPATIENT
Start: 2021-04-14 | End: 2021-04-16 | Stop reason: HOSPADM

## 2021-04-14 RX ORDER — TAMSULOSIN HYDROCHLORIDE 0.4 MG/1
0.4 CAPSULE ORAL DAILY
Status: DISCONTINUED | OUTPATIENT
Start: 2021-04-14 | End: 2021-04-16 | Stop reason: HOSPADM

## 2021-04-14 RX ORDER — ALBUTEROL SULFATE 90 UG/1
1-2 AEROSOL, METERED RESPIRATORY (INHALATION) EVERY 6 HOURS PRN
Status: DISCONTINUED | OUTPATIENT
Start: 2021-04-14 | End: 2021-04-16 | Stop reason: HOSPADM

## 2021-04-14 RX ORDER — BISACODYL 10 MG
10 SUPPOSITORY, RECTAL RECTAL DAILY PRN
Status: DISCONTINUED | OUTPATIENT
Start: 2021-04-14 | End: 2021-04-16 | Stop reason: HOSPADM

## 2021-04-14 RX ORDER — ACETAMINOPHEN 325 MG/1
650 TABLET ORAL EVERY 4 HOURS PRN
Status: DISCONTINUED | OUTPATIENT
Start: 2021-04-14 | End: 2021-04-16 | Stop reason: HOSPADM

## 2021-04-14 RX ORDER — FAMOTIDINE 20 MG/1
40 TABLET, FILM COATED ORAL 2 TIMES DAILY
Status: DISCONTINUED | OUTPATIENT
Start: 2021-04-14 | End: 2021-04-14

## 2021-04-14 RX ORDER — HYDROXYZINE PAMOATE 25 MG/1
50 CAPSULE ORAL EVERY 6 HOURS PRN
Status: DISCONTINUED | OUTPATIENT
Start: 2021-04-14 | End: 2021-04-16 | Stop reason: HOSPADM

## 2021-04-14 RX ORDER — DULOXETIN HYDROCHLORIDE 60 MG/1
60 CAPSULE, DELAYED RELEASE ORAL DAILY
Status: DISCONTINUED | OUTPATIENT
Start: 2021-04-14 | End: 2021-04-16 | Stop reason: HOSPADM

## 2021-04-14 RX ADMIN — DULOXETINE HYDROCHLORIDE 60 MG: 60 CAPSULE, DELAYED RELEASE ORAL at 15:52

## 2021-04-14 RX ADMIN — IPRATROPIUM BROMIDE AND ALBUTEROL SULFATE 3 ML: .5; 3 SOLUTION RESPIRATORY (INHALATION) at 01:26

## 2021-04-14 RX ADMIN — FLUTICASONE FUROATE AND VILANTEROL TRIFENATATE 1 PUFF: 200; 25 POWDER RESPIRATORY (INHALATION) at 19:12

## 2021-04-14 RX ADMIN — IPRATROPIUM BROMIDE AND ALBUTEROL SULFATE 3 ML: .5; 3 SOLUTION RESPIRATORY (INHALATION) at 19:50

## 2021-04-14 RX ADMIN — SODIUM CHLORIDE: 9 INJECTION, SOLUTION INTRAVENOUS at 05:31

## 2021-04-14 RX ADMIN — ATORVASTATIN CALCIUM 10 MG: 10 TABLET, FILM COATED ORAL at 15:52

## 2021-04-14 RX ADMIN — CEFEPIME HYDROCHLORIDE 2 G: 2 INJECTION, POWDER, FOR SOLUTION INTRAVENOUS at 21:49

## 2021-04-14 RX ADMIN — FAMOTIDINE 40 MG: 10 INJECTION, SOLUTION INTRAVENOUS at 09:52

## 2021-04-14 RX ADMIN — IRON SUCROSE 300 MG: 20 INJECTION, SOLUTION INTRAVENOUS at 10:35

## 2021-04-14 RX ADMIN — GABAPENTIN 600 MG: 300 CAPSULE ORAL at 21:41

## 2021-04-14 RX ADMIN — CEFEPIME HYDROCHLORIDE 2 G: 2 INJECTION, POWDER, FOR SOLUTION INTRAVENOUS at 09:52

## 2021-04-14 RX ADMIN — METOPROLOL SUCCINATE 12.5 MG: 25 TABLET, EXTENDED RELEASE ORAL at 15:53

## 2021-04-14 RX ADMIN — UMECLIDINIUM 1 PUFF: 62.5 AEROSOL, POWDER ORAL at 19:11

## 2021-04-14 RX ADMIN — TAMSULOSIN HYDROCHLORIDE 0.4 MG: 0.4 CAPSULE ORAL at 15:53

## 2021-04-14 RX ADMIN — AZITHROMYCIN MONOHYDRATE 250 MG: 500 INJECTION, POWDER, LYOPHILIZED, FOR SOLUTION INTRAVENOUS at 20:38

## 2021-04-14 RX ADMIN — IPRATROPIUM BROMIDE AND ALBUTEROL SULFATE 3 ML: .5; 3 SOLUTION RESPIRATORY (INHALATION) at 15:56

## 2021-04-14 RX ADMIN — IPRATROPIUM BROMIDE AND ALBUTEROL SULFATE 3 ML: .5; 3 SOLUTION RESPIRATORY (INHALATION) at 07:04

## 2021-04-14 ASSESSMENT — ACTIVITIES OF DAILY LIVING (ADL)
WALKING_OR_CLIMBING_STAIRS_DIFFICULTY: NO
PREVIOUS_RESPONSIBILITIES: YARDWORK;MEDICATION MANAGEMENT;DRIVING
DOING_ERRANDS_INDEPENDENTLY_DIFFICULTY: NO
VISION_MANAGEMENT: GLASSES
DIFFICULTY_EATING/SWALLOWING: NO
ADLS_ACUITY_SCORE: 16
TOILETING_ISSUES: NO
DRESSING/BATHING_DIFFICULTY: NO
DIFFICULTY_COMMUNICATING: NO
FALL_HISTORY_WITHIN_LAST_SIX_MONTHS: NO
CONCENTRATING,_REMEMBERING_OR_MAKING_DECISIONS_DIFFICULTY: NO
ADLS_ACUITY_SCORE: 14
DEPENDENT_IADLS:: INDEPENDENT
PATIENT_/_FAMILY_COMMUNICATION_STYLE: SPOKEN LANGUAGE (ENGLISH OR BILINGUAL)
ADLS_ACUITY_SCORE: 14
ADLS_ACUITY_SCORE: 14
HEARING_DIFFICULTY_OR_DEAF: NO
WEAR_GLASSES_OR_BLIND: YES
EQUIPMENT_CURRENTLY_USED_AT_HOME: CANE, STRAIGHT
ADLS_ACUITY_SCORE: 14

## 2021-04-14 NOTE — ED PROVIDER NOTES
History   Chief Complaint:  Shortness of Breath     The history is provided by the patient and the EMS personnel.      Hermilo Velasquez is a 88 year old male with a complicated history of aortic stenosis, atrial fibrillation, adenocarcinoma, hyperlipidemia, COPD, and chronic kidney disease who presents via EMS with shortness of breath. EMS reports that the patient called EMS for difficulty breathing and was found with shortness of breath, tremors, and O2 saturation in the low 80s on room air. When EMS took the patient to the ambulance they placed the patient on 15 L of high flow oxygen and his O2 returned to high 80s. EMS says the patient's initial blood pressure was 60/30 and a 20 gauge IV was placed in the right arm where 500 mL of IV fluids was given. His blood glucose was 145 and his temperature was 102 orally en route. EMS states his latest blood pressure was 80/40 and he had clear lung sounds bilaterally. Here, the patient reports he is unsure of how long he has been sick. He reports using 2L of supplemental oxygen at home during the night and albuterol inhalers. Denies pain, vomiting, diarrhea, known COVID-19 exposure, or use of a CPAP machine. He reports getting both doses of the COVID-19 vaccines.     Review of Systems   Respiratory: Positive for shortness of breath.    Gastrointestinal: Negative for diarrhea and vomiting.   Musculoskeletal: Negative for myalgias.   All other systems reviewed and are negative.    Allergies:  Lidocaine  Omeprazole  Pantoprazole  Prevacid   Lasix   Penicillins    Medications:  Lipitor  Neurontin  Metoprolol succinate  Protonix  Flomax  Xarelto  Spiriva  Atarax  Eliquis  Pepcid  Vistaril  Protonix    Past Medical History:    Adenocarcinoma, lung, right   Aortic stenosis   Atrial fibrillation  COPD   Deep vein thrombosis   GERD   Heart murmur   Monoclonal gammopathy   Need for SBE (subacute bacterial endocarditis) prophylaxis   Neuropathy   Hyperlipidemia   Malignant  lymphomas   RBBB    Severe sepsis with acute organ dysfunction    Unspecified hereditary and idiopathic peripheral neuropathy   Anemia  Chronic kidney disease  Centrilobular emphysema  Acute kidney injury  Spongiotic dermatitis  Nodule of right lower lung    Past Surgical History:    Appendectomy  Back surgery  Total knee arthroscopy  EGD  Hernia repair  Knee surgery, right  Replace aortic valve  Bilateral rotator cuff repair  Spinal surgery, x3  Thoracotomy, right lung resection  Tonsillectomy  TURP    Family History:    Coronary artery disease  Emphysema    Social History:  Presents via EMS. Presents alone.     Physical Exam     Patient Vitals for the past 24 hrs:   BP Temp Temp src Pulse Resp SpO2 Weight   04/13/21 2115 105/48 -- -- 87 19 -- --   04/13/21 2110 105/56 -- -- 81 (!) 56 90 % --   04/13/21 2105 92/55 -- -- 82 19 95 % --   04/13/21 2100 95/47 -- -- 86 15 95 % --   04/13/21 2055 (!) 85/48 -- -- 89 19 90 % --   04/13/21 2050 (!) 89/54 -- -- 92 25 100 % --   04/13/21 2045 90/41 -- -- 86 23 94 % --   04/13/21 2040 90/41 -- -- 85 25 95 % --   04/13/21 2035 -- -- -- 90 22 -- --   04/13/21 2034 (!) 80/43 -- -- 93 24 100 % --   04/13/21 2029 110/65 99.2  F (37.3  C) Temporal 96 (!) 31 -- 77.5 kg (170 lb 13.7 oz)       Physical Exam  GENERAL: well developed, pleasant. Looks comfortable.   HEAD: atraumatic  EYES: pupils reactive, extraocular muscles intact, conjunctivae normal  ENT:  mucus membranes moist. Hard of hearing.  NECK:  trachea midline, normal range of motion  RESPIRATORY:. No use of accessory muscles. No wheezing, no significant tachypnea, faint wheezing in the bases, left more than right  CVS: normal S1/S2, no murmurs, intact distal pulses. No peripheral edema.  ABDOMEN: soft, nontender, nondistention  MUSCULOSKELETAL: no deformities  SKIN: warm and dry, no acute rashes or ulceration  NEURO: GCS 15, cranial nerves intact, alert and oriented x3  PSYCH:  Mood/affect normal    Emergency Department  Course   ECG  ECG taken at 2027, ECG read at 2028  Normal sinus rhythm  Right bundle branch block  Abnormal ECG   No significant change as compared to dated 08/15/2020  Rate 91 bpm. HI interval 182 ms. QRS duration 130 ms.   QT/QTc 362/445 ms. P-R-T axes 79 25 68.     Imaging:  XR Chest Port 1 View  Emphysematous appearing lungs. Patchy opacity in the left lower lobe could represent developing pneumonia. No pneumothorax or pleural effusion.  CHRISTINA MCKEON MD  Reading per radiology    Laboratory:  CBC: WBC 24.6(H), HGB 7.0(L),    CMP: Chloride 111(H), Glucose 130(H), Creatinine 1.60(H), GFR 38(L), Calcium 8.0(L), Albumin 2.6(L), Protein Total 6.1(L) o/w WNL   Lactic acid (Resulted: 2057) 2.6(H)  Lactic acid(Resulted 2242): 1.1  Procalcitonin: 2.30  Blood culture x2: In process  Troponin (Collected 2038): 0.331(HH)    Symptomatic Influenza A/B & SARS-CoV2 (COVID19) Virus PCR Multiplex: Negative    Emergency Department Course:    Reviewed:  I reviewed nursing notes, vitals, past medical history and care everywhere    Assessments:  2023 The patient arrived to [facility] via EMS. EMS gave report    2025 I performed an assessment and obtained a history from the patient.     2028 Labs were taken from right peripheral IV placed by the EMS. Temperature reported of 99.2 degrees.     2028 I ordered the patient to be taken off High Flow oxygen    2125 The patient was moved from [facility] to ED15    2132 NS 2,325 mL IV    2154 Maxipime 2 g IV    2207 D/W hospitalist, Dr. Hernandez    2228 Zithromax 500 mg IV    2336  mL/hr IV    Disposition:  The patient was admitted to the hospital under the care of Dr. Hernandez.     Impression & Plan       Medical Decision Making:  Patient presents with fever cough hypoxia and hypotension.  Per records his blood pressure normally runs around 100.  He was given IV fluids with improvement of his blood pressure.  Blood cultures Covid and x-ray were obtained showing possible infiltrate or  pneumonia in the left base.  Certainly broad differential is considered versus pneumonia, COPD exacerbation, acute coronary syndrome, Covid amongst others.  With his elevated white count infiltrate and symptoms looks to be most consistent with pneumonia.  He was given antibiotics.  He was initially in our critical care area but improved with supportive care as above with fluids.  He is currently on 3 L of nasal cannula and looks to be tolerating this well.  Last admission his blood pressure was running around 105 systolic.  Spoke with the hospitalist regarding admission.  Troponin is slight elevation likely stress response.  Lactic acid was slightly elevated but improved and repeat is normal.    Critical Care Time: was 35 minutes for this patient excluding procedures    Covid-19  Hermilo Velasquez was evaluated during a global COVID-19 pandemic, which necessitated consideration that the patient might be at risk for infection with the SARS-CoV-2 virus that causes COVID-19.   Applicable protocols for evaluation were followed during the patient's care. COVID-19 was considered as part of the patient's evaluation. The plan for testing is: a test was obtained during this visit.    Diagnosis:    ICD-10-CM    1. Pneumonia of left lower lobe due to infectious organism  J18.9    2. Anemia, unspecified type  D64.9    3. Renal insufficiency  N28.9    4. Elevated troponin  R77.8      Scribe Disclosure:  Leeann MONK, am serving as a scribe at 8:31 PM on 4/13/2021 to document services personally performed by John West MD based on my observations and the provider's statements to me.              John West MD  04/14/21 0016

## 2021-04-14 NOTE — ED NOTES
Bed: ST  Expected date: 4/13/21  Expected time: 8:25 PM  Means of arrival: Ambulance  Comments:  HEMS 439 88M hypotensive, fever, sob

## 2021-04-14 NOTE — PROGRESS NOTES
[facility]    Hospitalist Progress Note    Date of Service (when I saw the patient): 04/14/2021    Assessment & Plan   Hermilo Velasquez is a 88 year old male who was admitted on 4/13/2021.  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     PLAN     Severe sepsis due to Left  lower lobe pneumonia causing acute COPD exacerbation:   We note that he has a history of lung adencarcinoma status post right lower lobe wedge resection in 2019. He also has COPD and at times in the past has required 2 L O2 to use at night; it is currently unclear to me if he continues to use that. He presents for shaking tremors which could have been rigors. He initially was hypotensive and requires 3 L O2 to keep sats above 90%. He has leukocytosis and elevated Lactate.Overall we suspect severe sepsis due to LLL pneumonia where an infiltrate is seen on CXR, leading to acute COPD exacerbation. IVF has improved the BP and repeate Lactate has normalized.  He is at risk for Pseudomonas.    --  Inpatient. Continue empiric antibiotics with Cefepime and Azithromycin (noting report of prior rash to Penicillins).    Duonebs scheduled q4 hours, Albuterol nebs every 2 hours as needed.. Monitor oxygenation. Robitussin ordered as needed for cough.   Patient is on 3 L of oxygen by nasal cannula today and satting 94 to 98%  Reduce IV fluids to 60 mL/h today  WBC count much improved from 24.6K on admission to 17.3K  Today  Procalcitonin was elevated at 2.3  Lactate improved from  2.6-1.1 today with hydration and antibiotics    Elevated troponin secondary to type II MI from demand ischemia in the setting of severe sepsis and left lower lobe pneumonia  Acute on chronic anemia  : We note that he has a history of TAVR and bioprosthetic valve replacement for Aortic Stenosis. TTE 3/2020 showed preserved LVEF with LVH. His myonecrosis currently could be due to demand ischemia from sepsis.    -- Telemetry, serial enzymes.  Troponins trended up 0.3-0.5  TTE done today showed EF of 55 to 60%.  Right ventricle is normal in structure function and size.  Left atrium is moderately dilated.  There is a bioprosthetic aortic valve valve is poorly visualized.  In comparison with the prior study the gradient across the bioprosthetic aortic valve have increased previously at 12 mm of which he currently increased to 25 mmHg  Cardiology consultation requested  Recommended medical management of the pneumonia and correction of the anemia  Cardiology currently signed off.  Appreciate assistance    Acute on chronic anemia while on anticoagulation with Eliquis;  Iron deficiency anemia with mixed picture  Patient's hemoglobin was low at 7 on admission  Hemoglobin dropped to 5.6 today  2 units of PRBCs ordered in the setting of elevated troponin  Follow hemoglobin closely  Eliquis on hold due to the worsening of anemia during this hospitalization  GI consultation requested  Patient would prefer to avoid EGD and colonoscopy during this hospitalization as per GI discussion with the patient  Patient's iron is low at the 6 ferritin is low at 18 TIBC is normal at 298 with a saturation index of 2  Started on IV iron daily 3 doses during this hospitalization    Suspected FRED: Cr 1.60 and was 1.09 on 10/2020. We suspect this could be due to hypovolemia.  Creatinine improved to 1.34 from 1.60 yesterday     PAF;  With acute anemia Eliquis is on hold  Blood pressure is soft today systolics in the 90s       Recurrent PE:  Noted  Anticoagulation on hold due to the worsening of anemia  Plan on restarting Eliquis tomorrow if hemoglobin stays stable     Dyslipidemia: Resumed Lipitor      Non-Hodgkins Lymphoma and MGUS: I am currently unsure of the status of these conditions. From what I can ascertain from a brief review of his extensive medical records, the cancer might seem currently to be in remission.     Acute on  Chronic anemia: Due to PATIENCE. HGB 7.0 and was 8.7 on 1/21/2021. 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     -- Monitor CBC closely while hospitalized and on DOAC    -- Resume PPI when verified  As noted above      Bullous pemphigoid and chronic pruritus: By history; noted.    Generalized weakness;  PT OT consultations requested     Rule Out COVID-19 infection  This patient was evaluated during a global COVID-19 pandemic, which necessitated consideration that the patient might be at risk for infection with the SARS-CoV-2 virus that causes COVID-19. Applicable protocols for evaluation were followed during the patient's care. Low suspicion for infection.   -negative COVID-19 PCR test result  -no current indication for precautions    DVT Prophylaxis: Pneumatic Compression Devices  Code Status: Full Code    Disposition: Expected discharge in 2 to 3 days after improvement of sepsis and pneumonia and hemoglobin stays stable    Greater than 35 minutes were spent in reviewing the chart today, counseling the patient, collaboration of care    Discussed with bedside RN, patient, gastroenterology today  Deana Stevenson MD  [phone] (P)      Interval History   Patient is resting comfortably in bed.  Denies any shortness of breath or chest pain currently.  Hemoglobin dropped to 5.6 this morning getting 2 units of PRBCs transfusion today.  Echo showed normal EF.   Creatinine is improving.  Start on clear liquid diet to advance as tolerated per GI today    -Data reviewed today: I reviewed all new labs and imaging results over the last 24 hours. I personally reviewed no images or EKG's today.    Physical Exam   Temp: 98  F (36.7  C) Temp src: Oral BP: 120/59 Pulse: 83   Resp: 20 SpO2: 98 % O2 Device: Nasal cannula Oxygen Delivery: 3 LPM  Vitals:    04/13/21 2029   Weight: 77.5 kg (170 lb 13.7 oz)     Vital Signs with Ranges  Temp:  [97.3  F (36.3  C)-99.2  F (37.3  C)] 98  F (36.7  C)  Pulse:  [80-98] 83  Resp:  [15-56] 20  BP: ()/(41-65) 120/59  SpO2:  [90 %-100 %] 98 %  No intake/output data recorded.    Constitutional: Awake, alert, cooperative, no apparent distress  Respiratory: Bilateral occasional wheezing present on exam today  Cardiovascular: Regular rate and rhythm, normal S1 and S2, and no murmur noted  GI: Normal bowel sounds, soft, non-distended, non-tender  Skin/Integumen: No rashes, no cyanosis, no edema  Other:     Medications     sodium chloride 60 mL/hr at 04/14/21 1157       sodium chloride 0.9%  1,000 mL Intravenous Once     azithromycin  250 mg Intravenous Q24H     ceFEPIme (MAXIPIME) IV  2 g Intravenous Q12H     famotidine  40 mg Intravenous Daily     ipratropium - albuterol 0.5 mg/2.5 mg/3 mL  3 mL Nebulization Q4H While awake     iron sucrose (VENOFER) intermittent infusion  300 mg Intravenous Daily     sodium chloride (PF)  3 mL Intracatheter Q8H       Data   Recent Labs   Lab 04/14/21  0604 04/14/21  0506 04/14/21  0039 04/13/21 2038   WBC 17.3* 17.6*  --  24.6*   HGB 5.6* 5.8*  --  7.0*   MCV 82 82  --  81    244  --  290   NA  --  142  --  141   POTASSIUM  --  4.2  --  4.3   CHLORIDE  --  114*  --  111*   CO2  --  25  --  26   BUN  --  24  --  21   CR  --  1.34*  --  1.60*   ANIONGAP  --  3  --  4   AMRITA  --  7.0*  --  8.0*   GLC  --  113*  --  130*   ALBUMIN  --  2.1*  --  2.6*   PROTTOTAL  --  4.8*  --  6.1*   BILITOTAL  --  0.4  --   0.5   ALKPHOS  --  62  --  82   ALT  --  8  --  10   AST  --  7  --  10   TROPI  --  0.513* 0.529* 0.331*       Recent Results (from the past 24 hour(s))   XR Chest Port 1 View    Narrative    CHEST ONE VIEW PORTABLE   2021 9:20 PM     HISTORY: Fever, cough.    COMPARISON: 2020      Impression    IMPRESSION: Emphysematous appearing lungs. Patchy opacity in the left  lower lobe could represent developing pneumonia. No pneumothorax or  pleural effusion.    CHRISTINA MCKEON MD   Echocardiogram Complete    Narrative    [id number]  [id number]  [id number]  [id number]^ADDIE^BRISA^JONATHAN     [facility]  Echocardiography Laboratory  [address] [place] [address]     Name: CLYDE RODRIGUEZ  MRN: [id number]  : 1932  Study Date: 2021 10:59 AM  Age: 88 yrs  Gender: Male  Patient Location: [facility]  Reason For Study: CHF  Ordering Physician: BRISA REAL  Referring Physician: ALISON DOZIER  Performed By: Cinthia Vo     BSA: 1.9 m2  Height: 70 in  Weight: 170 lb  HR: 88  BP: 96/46 mmHg  ______________________________________________________________________________  Procedure  Complete Portable Echo Adult.  ______________________________________________________________________________  Interpretation Summary     1. Left ventricular systolic function is normal. The visual ejection fraction  is estimated at 55-60%.  2. No regional wall motion abnormalities noted.  3. The right ventricle is normal in structure, function and size.  4. The left atrium is moderately dilated.  5. 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 The valve is poorly visualized.  There is increased gradients across the valve consistent with mild to moderate  prosthetic valve stenosis; mean gradient 25 mmHg, Peak velocity 3.3 m/sec,  calculated valve area 1.4 cm2.  7. In comparison with the prior study, the gradients across the bioprosthetic  aortic valve have increased (previously 12  mmHg).  ______________________________________________________________________________  Left Ventricle  The left ventricle is normal in size. There is normal left ventricular wall  thickness. Left ventricular systolic function is normal. The visual ejection  fraction is estimated at 55-60%. Diastolic Doppler findings (E/E' ratio and/or  other parameters) suggest left ventricular filling pressures are increased. No  regional wall motion abnormalities noted.     Right Ventricle  The right ventricle is normal in structure, function and size.     Atria  The left atrium is moderately dilated. The right atrium is moderately dilated.  There is no color Doppler evidence of an atrial shunt.     Mitral Valve  The mitral valve is normal in structure and function.     Tricuspid Valve  The tricuspid valve is normal in structure and function. There is trace  tricuspid regurgitation.     Aortic Valve  The aortic valve is not well visualized. There is a bioprosthetic aortic  valve.     Pulmonic Valve  The pulmonic valve is not well seen, but is grossly normal.     Vessels  Normal size aorta. IVC diameter <2.1 cm collapsing >50% with sniff suggests a  normal RA pressure of 3 mmHg.     Pericardium  There is no pericardial effusion.     ______________________________________________________________________________  MMode/2D Measurements & Calculations  IVSd: 1.1 cm  LVIDd: 4.6 cm  LVIDs: 3.5 cm  LVPWd: 1.1 cm  FS: 24.2 %  LV mass(C)d: 179.0 grams  LV mass(C)dI: 91.9 grams/m2     Ao root diam: 3.6 cm  LA dimension: 4.2 cm  LA/Ao: 1.2  LVOT diam: 2.1 cm  LVOT area: 3.6 cm2  LA Volume (BP): 87.1 ml  LA Volume Index (BP): 44.7 ml/m2  RWT: 0.47     Doppler Measurements & Calculations  MV E max earlene: 140.0 cm/sec  MV A max earlene: 157.3 cm/sec  MV E/A: 0.89  MV max P.2 mmHg  MV mean P.3 mmHg  MV V2 VTI: 36.7 cm  MVA(VTI): 2.7 cm2  MV P1/2t max earlene: 170.7 cm/sec  MV P1/2t: 49.7 msec  MVA(P1/2t): 4.4 cm2  MV dec slope: 1007 cm/sec2  MV  dec time: 0.14 sec     Ao V2 max: 334.5 cm/sec  Ao max P.0 mmHg  Ao V2 mean: 234.1 cm/sec  Ao mean P.0 mmHg  Ao V2 VTI: 70.9 cm  АННА(I,D): 1.4 cm2  АННА(V,D): 1.5 cm2  LV V1 max P.7 mmHg  LV V1 max: 138.9 cm/sec  LV V1 VTI: 27.7 cm  SV(LVOT): 99.1 ml  SI(LVOT): 50.9 ml/m2  PA V2 max: 90.8 cm/sec  PA max PG: 3.3 mmHg  AV Santi Ratio (DI): 0.42  АННА Index (cm2/m2): 0.72  E/E' av.1  Lateral E/e': 11.6  Medial E/e': 20.6     ______________________________________________________________________________  Report approved by: Erika Perez 2021 12:14 PM

## 2021-04-14 NOTE — PROGRESS NOTES
RECEIVING UNIT ED HANDOFF REVIEW    ED Nurse Handoff Report was reviewed by: Maxim Dietrich RN on April 13, 2021 at 11:34 PM

## 2021-04-14 NOTE — PLAN OF CARE
Summary:     DATE & TIME: 4/14/21  Cognitive Concerns/ Orientation : Alert and Oriented x4, Hard of Hearing.     BEHAVIOR & AGGRESSION TOOL COLOR: Green   CIWA SCORE: NA   ABNL VS/O2: VSS except for SBP in 90's improving from 80's in the ED.    MOBILITY: Patient didn't get out of bed, thought to be an assist of one.    PAIN MANAGMENT: Denies   DIET: Low Fat   BOWEL/BLADDER: Continent   ABNL LAB/BG: Trop 0.529 and Hgb 5.8, Dr. Hernandez notified and ordered a recheck.  Recheck was 5.6.    DRAIN/DEVICES: PIV infusing at 150  TELEMETRY RHYTHM: NA   SKIN: Blanchable redness on buttocks.  Bruises scattered.  Dusky lower extremities.    TESTS/PROCEDURES: NA   D/C DAY/GOALS/PLACE: Pending improved O2 need.    OTHER IMPORTANT INFO: Morning Hgb 5.6- consent obtained for blood transfusion.  Dr. Hernandez ordered patient to transfuse one unit of blood and be NPO and for a GI consult.

## 2021-04-14 NOTE — ED NOTES
"[facility]  ED Nurse Handoff Report    ED Chief complaint: Shortness of Breath      ED Diagnosis:   Final diagnoses:   Pneumonia of left lower lobe due to infectious organism   Anemia, unspecified type   Renal insufficiency   Elevated troponin       Code Status: Full Code; per EPIC    Allergies:   Allergies   Allergen Reactions     Lidocaine Blisters     Allergy to lidocaine ointment     Omeprazole Itching     Pantoprazole Itching     Prevacid [Lansoprazole] Itching     Lasix [Furosemide] Rash     Penicillins Rash     \"broke out from injection\" 60 yrs ago         Patient Story: Patient called EMS for dyspnea, EMS reported 80% on room air.  Patient initially placed on NRB 15L and weaned down to NC 3L maintaining at 92-94%.  Productive cough; thick yellow sputum.  Patient has a history of COPD, uses supplemental oxygen and albuterol inhalers at home.  Patient is a poor historian of symptom onset.      Focused Assessment:  Hypoxia, Hypotension; improving while in ED.    Treatments and/or interventions provided: Elevated WBC and elevated lactic acid 2.6; IVF approx 2,300 and Oxygen.  Antibiotics given in ED; see MAR for details.  2 sets of blood cultures obtained in the ED and pending.  Repeat lactic acid pending as of 10:31 PM    Labs Ordered and Resulted from Time of ED Arrival Up to the Time of Departure from the ED   CBC WITH PLATELETS DIFFERENTIAL - Abnormal; Notable for the following components:       Result Value    WBC 24.6 (*)     RBC Count 3.12 (*)     Hemoglobin 7.0 (*)     Hematocrit 25.3 (*)     MCH 22.4 (*)     MCHC 27.7 (*)     RDW 15.1 (*)     Absolute Neutrophil 21.9 (*)     Absolute Monocytes 1.7 (*)     All other components within normal limits   COMPREHENSIVE METABOLIC PANEL - Abnormal; Notable for the following components:    Chloride 111 (*)     Glucose 130 (*)     Creatinine 1.60 (*)     GFR Estimate 38 (*)     GFR Estimate If Black 44 (*)     Calcium 8.0 (*)     All other " components within normal limits   LACTIC ACID WHOLE BLOOD - Abnormal; Notable for the following components:    Lactic Acid 2.6 (*)     All other components within normal limits   PROCALCITONIN   INFLUENZA A/B & SARS-COV2 PCR MULTIPLEX   TROPONIN I   PERIPHERAL IV CATHETER   PULSE OXIMETRY NURSING   BLOOD CULTURE   BLOOD CULTURE     XR Chest Port 1 View   Preliminary Result   IMPRESSION: Emphysematous appearing lungs. Patchy opacity in the left   lower lobe could represent developing pneumonia. No pneumothorax or   pleural effusion.          Patient's response to treatments and/or interventions: Tolerated well    To be done/followed up on inpatient unit:  Inpatient orders.    Does this patient have any cognitive concerns?: Alert et oriented x4.  Very hard of hearing; reports having hearing aides at home.  Patient reports wearing glasses however does not have them in ED.    Activity level - Baseline/Home:  Independent     Activity Level - Current:   Independent    Patient's Preferred language: English      Needed?: No    Isolation: None and COVID r/o and special precautions; Patient has received both covid vaccinations.    Infection: Not Applicable    Patient tested for COVID 19 prior to admission: YES; Swab pending.    Bariatric?: No    Vital Signs:   Vitals:    04/13/21 2100 04/13/21 2105 04/13/21 2110 04/13/21 2115   BP: 95/47 92/55 105/56 105/48   Pulse: 86 82 81 87   Resp: 15 19 (!) 56 19   Temp:       TempSrc:       SpO2: 95% 95% 90%    Weight:           Cardiac Rhythm:Cardiac Rhythm: Normal sinus rhythm.  H/o A-fib (takes warfarin) and RBB.    Was the PSS-3 completed:   Yes    What interventions are required if any?  None Required.    Family Comments: Patient lives at home with wife of 65 years.    OBS brochure/video discussed/provided to patient/family: No              Name of person given brochure if not patient: N/A              Relationship to patient: N/A    For the majority of the shift this  patient's behavior was Green.     Behavioral interventions performed were N/A.    ED NURSE PHONE NUMBER: *[id number]

## 2021-04-14 NOTE — H&P
[facility]    History and Physical  Hospitalist       Date of Admission:  4/13/2021  Date of Service (when I saw the patient): 04/13/21    ASSESSMENT  Hermilo Velasquez is a markedly pleasant 88 year old gentleman former smoker with extensive past medical history that is most notable for COPD, aortic stenosis status post AVR and bioprosthetic valve replacement, paroxysmal atrial fibrillation and recurrent PE on Apixaban, prior lung cancer, and prior Non-Hodgkins Lymphoma, among multiple others; who presents with rigors and dyspnea and is found to have severe sepsis due to right lower lobe pneumonia causing acute COPD exacerbation.    PLAN    Severe sepsis due to right lower lobe pneumonia causing acute COPD exacerbation: We note that he has a history of lung adencarcinoma status post right lower lobe wedge resection in 2019. He also has COPD and at times in the past has required 2 L O2 to use at night; it is currently unclear to me if he continues to use that. He presents for shaking tremors which could have been rigors. He initially was hypotensive and requires 3 L O2 to keep sats above 90%. He has leukocytosis and elevated Lactate.Overall we suspect severe sepsis due to LLL pneumonia where an infiltrate is seen on CXR, leading to acute COPD exacerbation. IVF has improved the BP and repeate Lactate has normalized.  He is at risk for Pseudomonas.    --  Inpatient. Continue empiric antibiotics with Cefepime and Azithromycin (noting report of prior rash to Penicillins). Follow up cultures and check Pro-calcitonin. Duonebs scheduled q4 hours, Albuterol nebs every 2 hours as needed. IV NS continued aggressively overnight 150 ml/hour. Monitor oxygenation. Robitussin ordered as needed for cough.     -- If wheezing persists in AM, could add steroids.    Myonecrosis: We note that he has a history of TAVR and bioprosthetic valve replacement for Aortic Stenosis. TTE 3/2020 showed preserved LVEF with  LVH. His myonecrosis currently could be due to demand ischemia from sepsis.    -- Telemetry, serial enzymes. TTE.    Suspected FRED: Cr 1.60 and was 1.09 on 10/2020. We suspect this could be due to hypovolemia.    -- Repeat labs after IV fluid given overnight    PAF: Resume Apixaban and Toprol when verified    Recurrent PE: Noted    Dyslipidemia: Resume Lipitor when verified    Non-Hodgkins Lymphoma and MGUS: I am currently unsure of the status of these conditions. From what I can ascertain from a brief review of his extensive medical records, the cancer might seem currently to be in remission.    Chronic anemia: Due to PATIENCE. HGB 7.0 and was 8.7 on 1/21/2021. 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     -- Monitor CBC closely while hospitalized and on DOAC    -- Resume PPI when verified    Bullous pemphigoid and chronic pruritus: By history; noted.    Rule Out COVID-19 infection  This patient was evaluated during a global COVID-19 pandemic, which necessitated consideration that the patient might be at risk for infection with the SARS-CoV-2 virus that causes COVID-19. Applicable protocols for evaluation were followed during the patient's care. Low suspicion for infection.   -negative COVID-19 PCR test result  -no current indication for precautions    Chief Complaint   Shaking    History is obtained from the patient and the ED physician whom I have spoken with    History of Present Illness   Hermilo Velasquez is a markedly pleasant 88 year old gentleman who presents with generalized shaking. He says this started earlier today and became progressively worsened through the evening to the point that he called EMS; who found him to have markedly labored breathing and started supplemental oxygen. He says he feels better now. He denies recent cough  "or fever or chest pain, or nausea, or diarrhea, light headedness or sweating, or any other acute complaints other than associated thirst.    In the ED, Blood pressure 105/48, pulse 87, temperature 99.2  F (37.3  C), temperature source Temporal, resp. rate 19, weight 77.5 kg (170 lb 13.7 oz), SpO2 90 %.    Initial BP 80/43 in the ED, improved after 2 L IV fluid given.  He requires 3 liters O2 by nasal cannula to keep sats above 90% in the ED.     CBC was notable for WBC 24.6, HGB 7.0; PLT were 290. Lactate was 2.6. CMP was notable for Cl 111, BUN 21, Cr 1.60, Ca 8.0, alb 2.6, tprot 6.1, otherwise was within the normal reference range. Glucose was 130. Troponin was 0.331. EKG showed NSR with RBBB. COVID and Influenza were negative. Blood cultures were sent.     CXR showed: \"IMPRESSION: Emphysematous appearing lungs. Patchy opacity in the left lower lobe could represent developing pneumonia. No pneumothorax or pleural effusion.\"    He was given Cefepime and Azithromycin in the ED.    PHYSICAL EXAM  Blood pressure 105/48, pulse 87, temperature 99.2  F (37.3  C), temperature source Temporal, resp. rate 19, weight 77.5 kg (170 lb 13.7 oz), SpO2 90 %.  Constitutional: Alert and oriented to person, place; no apparent distress; appears thin and frail  HEENT: normocephalic moist mucus membranes  Respiratory: lungs have loud bilateral wheezing o auscultation bilaterally  Cardiovascular: Irregular S1 S2  GI: abdomen soft non tender non distended bowel sounds positive  Lymph/Hematologic: pale, no cervical lymphadenopathy  Skin: no rash, good turgor  Musculoskeletal: no clubbing, cyanosis or edema  Neurologic: extra-ocular muscles intact; moves all four extremities  Psychiatric: appropriate affect, speech tangential at times     DVT Prophylaxis: DOAC  Code Status: Full Code by POLST from 2016 and confirmed again with him tonight    Disposition: Expected discharge in 2-4 days    Nadeem Hernandez MD    Past Medical History  "   I have reviewed this patient's medical history and updated it with pertinent information if needed.   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 Vasques      Aortic stenosis     Severe AS, 9/2015 AVR - [facility] TRIFECTA Bovine bioprosthesis 25MM TF-25A     Atrial fibrillation (H)     9/2015 Paroxysmal post op Afib - discharged on Warfarin and a beta blocker     COPD (chronic obstructive pulmonary disease) (H)      Deep vein thrombosis (H)      GERD (gastroesophageal reflux disease)      Heart murmur      Monoclonal gammopathy     plasmacyte prominent causing monoclonal gammopathy     Need for SBE (subacute bacterial endocarditis) prophylaxis      Neuropathy      Other and unspecified hyperlipidemia      Other malignant lymphomas     non hodgkin's lymphoma     RBBB (right bundle branch block)      Severe sepsis with acute organ dysfunction (H) 11/16/2015     Unspecified hereditary and idiopathic peripheral neuropathy        Past Surgical History   I have reviewed this patient's surgical history and updated it with pertinent information if needed.  Past Surgical History:   Procedure Laterality Date     APPENDECTOMY       AS TOTAL KNEE ARTHROPLASTY       BACK SURGERY       ESOPHAGOSCOPY, GASTROSCOPY, DUODENOSCOPY (EGD), COMBINED N/A 11/28/2015    Procedure: COMBINED ESOPHAGOSCOPY, GASTROSCOPY, DUODENOSCOPY (EGD);  Surgeon: Danis Castillo MD;  Location:  GI     ESOPHAGOSCOPY, GASTROSCOPY, DUODENOSCOPY (EGD), COMBINED N/A 7/26/2018    Procedure: COMBINED ESOPHAGOSCOPY, GASTROSCOPY, DUODENOSCOPY (EGD);;  Surgeon: Toby Dong DO;  Location:  GI     ESOPHAGOSCOPY, GASTROSCOPY, DUODENOSCOPY (EGD), COMBINED N/A 8/17/2020    Procedure: ESOPHAGOGASTRODUODENOSCOPY (EGD);  Surgeon: Herrera Henry MD;  Location:  GI     ESOPHAGOSCOPY, GASTROSCOPY, DUODENOSCOPY (EGD), COMBINED N/A 10/24/2020    Procedure:  ESOPHAGOGASTRODUODENOSCOPY (EGD);  Surgeon: Vick Florentino MD;  Location:  GI     HERNIA REPAIR  2006     HERNIORRHAPHY VENTRAL  4/17/2013    Procedure: HERNIORRHAPHY VENTRAL;  VENTRAL HERNIA REPAIR WITH MESH;  Surgeon: Patel Guzman MD;  Location:  OR     KNEE SURGERY      arthroscopic right knee surgery      LOBECTOMY LUNG Right 3/26/2019    POSSIBLE LOBECTOMY LUNG per Dr. Vasques at [facility]     REPAIR LIGAMENT ANKLE  2/23/2012    Procedure:REPAIR LIGAMENT ANKLE; LEFT TARSAL TUNNEL RELEASE OF KNOT OF ARIEL RELEASE; Surgeon:SAUL PUENTE; Location: OR     REPLACE VALVE AORTIC N/A 9/3/2015    Procedure: REPLACE VALVE AORTIC;  Surgeon: Antonino Mitchell MD;  Location:  OR     ROTATOR CUFF REPAIR RT/LT      bilateral     SPINE SURGERY      3 spine surgeries     THORACOTOMY, WEDGE RESECTION LUNG, COMBINED Right 3/26/2019    RIGHT THORACOTOMY, WEDGE RESECTION, RIGHT LOWER LOBE LUNG NODULE,;  Surgeon: Jose A Vasques MD;  Location:  OR     TONSILLECTOMY       TURP         Prior to Admission Medications   Prior to Admission Medications   Prescriptions Last Dose Informant Patient Reported? Taking?   DULoxetine (CYMBALTA) 30 MG capsule   No No   Sig: One capsule once daily for one week then increase to two capsules once daily (for neuropathy).   albuterol (PROAIR HFA/PROVENTIL HFA/VENTOLIN HFA) 108 (90 Base) MCG/ACT inhaler  Self No No   Sig: Inhale 1-2 puffs into the lungs every 6 hours as needed for shortness of breath / dyspnea or wheezing   apixaban ANTICOAGULANT (ELIQUIS ANTICOAGULANT) 5 MG tablet   No No   Sig: Take 1 tablet (5 mg) by mouth 2 times daily   atorvastatin (LIPITOR) 10 MG tablet   No No   Sig: Take 1 tablet (10 mg) by mouth daily   famotidine (PEPCID) 40 MG tablet   No No   Sig: Take 1 tablet (40 mg) by mouth 2 times daily   ferrous sulfate (FE TABS) 325 (65 Fe) MG EC tablet  Self No No   Sig: Take 1 tablet (325 mg) by mouth daily   gabapentin (NEURONTIN)  "300 MG capsule  Self No No   Sig: Take 2 capsules (600 mg) by mouth At Bedtime Rx written for 300 mg in the morning and 900 mg at bedtime, but unclear how patient is taking at home (possibly just taking 1 capsule in the evening)   hydrOXYzine (VISTARIL) 50 MG capsule  Self Yes No   Sig: Take 50 mg by mouth every 6 hours as needed for itching   metoprolol succinate ER (TOPROL-XL) 25 MG 24 hr tablet  Self No No   Sig: Take 0.5 tablets (12.5 mg) by mouth daily   order for DME  Self No No   Sig: Equipment being ordered: Right neoprene knee brace.  ( Fax to [facility] fax  [phone])   pantoprazole (PROTONIX) 40 MG EC tablet  Self No No   Sig: Take 1 tablet (40 mg) by mouth 2 times daily (before meals)   tiotropium (SPIRIVA HANDIHALER) 18 MCG inhaled capsule  Self No No   Sig: Inhale 1 capsule (18 mcg) into the lungs daily   triamcinolone (KENALOG) 0.1 % external cream  Self No No   Sig: Apply topically 2 times daily as needed for irritation      Facility-Administered Medications Last Administration Doses Remaining   triamcinolone (KENALOG-40) injection 40 mg None recorded 1        Allergies   Allergies   Allergen Reactions     Lidocaine Blisters     Allergy to lidocaine ointment     Omeprazole Itching     Pantoprazole Itching     Prevacid [Lansoprazole] Itching     Lasix [Furosemide] Rash     Penicillins Rash     \"broke out from injection\" 60 yrs ago         Social History   I have reviewed this patient's social history and updated it with pertinent information if needed. Hermilo Velasquez  reports that he quit smoking about 23 years ago. He has a 110.00 pack-year smoking history. He has never used smokeless tobacco. He reports current alcohol use. He reports that he does not use drugs.    Family History   Family history assessed and, except as above, is non-contributory.    Family History   Problem Relation Age of Onset     C.A.D. Father      Emphysema Father      Melanoma No family hx of      Skin Cancer No " family hx of        Review of Systems   The 10 point Review of Systems is negative other than noted in the HPI or here.     Primary Care Physician   Karson Bishop    Data   Labs Ordered and Resulted from Time of ED Arrival Up to the Time of Departure from the ED   CBC WITH PLATELETS DIFFERENTIAL - Abnormal; Notable for the following components:       Result Value    WBC 24.6 (*)     RBC Count 3.12 (*)     Hemoglobin 7.0 (*)     Hematocrit 25.3 (*)     MCH 22.4 (*)     MCHC 27.7 (*)     RDW 15.1 (*)     Absolute Neutrophil 21.9 (*)     Absolute Monocytes 1.7 (*)     All other components within normal limits   COMPREHENSIVE METABOLIC PANEL - Abnormal; Notable for the following components:    Chloride 111 (*)     Glucose 130 (*)     Creatinine 1.60 (*)     GFR Estimate 38 (*)     GFR Estimate If Black 44 (*)     Calcium 8.0 (*)     Albumin 2.6 (*)     Protein Total 6.1 (*)     All other components within normal limits   LACTIC ACID WHOLE BLOOD - Abnormal; Notable for the following components:    Lactic Acid 2.6 (*)     All other components within normal limits   TROPONIN I - Abnormal; Notable for the following components:    Troponin I ES 0.331 (*)     All other components within normal limits   PROCALCITONIN   INFLUENZA A/B & SARS-COV2 PCR MULTIPLEX   LACTIC ACID WHOLE BLOOD   PERIPHERAL IV CATHETER   PULSE OXIMETRY NURSING   BLOOD CULTURE   BLOOD CULTURE       Data reviewed today:  I personally reviewed the EKG tracing showing NSR with RBBB and the chest x-ray image(s) showing LLL opacification.    Recent Results (from the past 24 hour(s))   XR Chest Port 1 View    Narrative    CHEST ONE VIEW PORTABLE   4/13/2021 9:20 PM     HISTORY: Fever, cough.    COMPARISON: 9/13/2020      Impression    IMPRESSION: Emphysematous appearing lungs. Patchy opacity in the left  lower lobe could represent developing pneumonia. No pneumothorax or  pleural effusion.    CHRISTINA MCKEON MD

## 2021-04-14 NOTE — CONSULTS
[facility]  Gastroenterology Consultation         Hermilo Velasquez  [address] [id number]  88 year old male    Admission Date/Time: 4/13/2021  Primary Care Provider: Karson Bishop  Referring / Attending Physician:  Dr. Nadeem Hernandez    We were asked to see the patient in consultation by Dr. Nadeem Hernandez for evaluation of acute on chronic anemia.      CC: dyspnea and rigors    HPI:  Hermilo Velasquez is a 88 year old male who presents with generalized shaking and chills with c/o shortness of breath. Yesterday he noted shaking became worse throughout day prompting an EMS call. He was noted to have labored breathing and was started on supplemental oxygen. Currently on 3 LPM and symptoms have improved. His workup has included labs and chest xray and noted to have left lobe pneumonia.Hemoglobin on presentation was 7.0 and dropped to 5.6. A unit of blood is currently being transfused.H e denies cough, fever, chest pain, nausea, diarrhea, melena, bright red rectal bleeding, or light headedness.     He has had previous episodes of acute on chronic anemia. His last workup was 5 months ago. He underwent and EGD and colonoscopy in 10/2020. EGD noted no abnormal findings. Colonoscopy noted six recently bleeding colonic angiodysplastic lesions and treated with APC. Noted one 10 mm polyp in sigmoid colon. NO specimens collected. He also had similar presentation in 2018 and underwent EGD and colon noting non bleeding colonic and gastric angioectasias.    Patient has significant h/o COPD, aortic stenosis status post AVR and bioprosthetic valve replacement, paroxysmal atrial fibrillation and recurrent PE on Apixaban, prior lung cancer, and prior Non-Hodgkins Lymphoma.    As noted he has a hemoglobin in mid 5 range. WBC elevated 24.6 and dropped to 17.3. Troponin 0.331->0.529-> 0.513. Lactic acid 1.26->1.1. Creatinine 1.34Sputum culture pending. COVID negative.    ROS: A  comprehensive ten point review of systems was negative aside from those in mentioned in the HPI.      PAST MED HX:  I have reviewed this patient's medical history and updated it with pertinent information if needed.   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 Vasques      Aortic stenosis     Severe AS, 9/2015 AVR - [facility] TRIFECTA Bovine bioprosthesis 25MM TF-25A     Atrial fibrillation (H)     9/2015 Paroxysmal post op Afib - discharged on Warfarin and a beta blocker     COPD (chronic obstructive pulmonary disease) (H)      Deep vein thrombosis (H)      GERD (gastroesophageal reflux disease)      Heart murmur      Monoclonal gammopathy     plasmacyte prominent causing monoclonal gammopathy     Need for SBE (subacute bacterial endocarditis) prophylaxis      Neuropathy      Other and unspecified hyperlipidemia      Other malignant lymphomas     non hodgkin's lymphoma     RBBB (right bundle branch block)      Severe sepsis with acute organ dysfunction (H) 11/16/2015     Unspecified hereditary and idiopathic peripheral neuropathy        MEDICATIONS:   Prior to Admission Medications   Prescriptions Last Dose Informant Patient Reported? Taking?   DULoxetine (CYMBALTA) 30 MG capsule   Yes Yes   Sig: Take 60 mg by mouth daily   albuterol (PROAIR HFA/PROVENTIL HFA/VENTOLIN HFA) 108 (90 Base) MCG/ACT inhaler  Self No Yes   Sig: Inhale 1-2 puffs into the lungs every 6 hours as needed for shortness of breath / dyspnea or wheezing   apixaban ANTICOAGULANT (ELIQUIS ANTICOAGULANT) 5 MG tablet   No Yes   Sig: Take 1 tablet (5 mg) by mouth 2 times daily   atorvastatin (LIPITOR) 10 MG tablet   No Yes   Sig: Take 1 tablet (10 mg) by mouth daily   famotidine (PEPCID) 40 MG tablet   No Yes   Sig: Take 1 tablet (40 mg) by mouth 2 times daily   ferrous sulfate (FE TABS) 325 (65 Fe) MG EC tablet  Self No Yes   Sig: Take 1 tablet (325 mg) by mouth daily  "  gabapentin (NEURONTIN) 300 MG capsule   Yes Yes   Sig: Take 600 mg by mouth At Bedtime   hydrOXYzine (VISTARIL) 50 MG capsule  Self Yes Yes   Sig: Take 50 mg by mouth every 6 hours as needed for itching   metoprolol succinate ER (TOPROL-XL) 25 MG 24 hr tablet  Self No Yes   Sig: Take 0.5 tablets (12.5 mg) by mouth daily   pantoprazole (PROTONIX) 40 MG EC tablet  Self No Yes   Sig: Take 1 tablet (40 mg) by mouth 2 times daily (before meals)   tamsulosin (FLOMAX) 0.4 MG capsule   Yes Yes   Sig: Take 0.4 mg by mouth daily   tiotropium (SPIRIVA HANDIHALER) 18 MCG inhaled capsule  Self No Yes   Sig: Inhale 1 capsule (18 mcg) into the lungs daily   triamcinolone (KENALOG) 0.1 % external cream  Self No Yes   Sig: Apply topically 2 times daily as needed for irritation      Facility-Administered Medications Last Administration Doses Remaining   triamcinolone (KENALOG-40) injection 40 mg None recorded 1          ALLERGIES:   Allergies   Allergen Reactions     Lidocaine Blisters     Allergy to lidocaine ointment     Omeprazole Itching     Pantoprazole Itching     Prevacid [Lansoprazole] Itching     Lasix [Furosemide] Rash     Penicillins Rash     \"broke out from injection\" 60 yrs ago         SOCIAL HISTORY:  Social History     Tobacco Use     Smoking status: Former Smoker     Packs/day: 2.00     Years: 55.00     Pack years: 110.00     Quit date: 1998     Years since quittin.2     Smokeless tobacco: Never Used   Substance Use Topics     Alcohol use: Yes     Alcohol/week: 0.0 standard drinks     Comment: 1 drink per day     Drug use: No       FAMILY HISTORY:  Family History   Problem Relation Age of Onset     C.A.D. Father      Emphysema Father      Melanoma No family hx of      Skin Cancer No family hx of        PHYSICAL EXAM:   General  Alert, oriented and comfortable  Vital Signs with Ranges  Temp: 98  F (36.7  C) Temp src: Oral BP: 101/49 Pulse: 94   Resp: 18 SpO2: 96 % O2 Device: Nasal cannula Oxygen Delivery: 3 " LPM  No intake/output data recorded.    Constitutional: healthy, alert and no distress   Cardiovascular: negative, PMI normal. No lifts, heaves, or thrills. RRR. No murmurs, clicks gallops or rub  Respiratory: negative, Percussion normal. Good diaphragmatic excursion. Lungs clear  Head: Normocephalic. No masses, lesions, tenderness or abnormalities  Neck: Neck supple. No adenopathy. Thyroid symmetric, normal size,, Carotids without bruits.  Abdomen: Abdomen soft, non-tender. BS normal. No masses, organomegaly          ADDITIONAL COMMENTS:   I reviewed the patient's new clinical lab test results.   Recent Labs   Lab Test 04/14/21  0604 04/14/21  0506 04/13/21  2038 10/23/20  1617 10/23/20  1617 08/17/20  0948 08/17/20  0948 08/16/20  0829   WBC 17.3* 17.6* 24.6*   < > 8.0   < > 8.9 9.5   HGB 5.6* 5.8* 7.0*   < > 6.6*   < > 7.7* 7.5*   MCV 82 82 81   < > 89   < > 82 81    244 290   < > 279   < > 273 272   INR  --   --   --   --  1.39*  --  1.21* 1.30*    < > = values in this interval not displayed.     Recent Labs   Lab Test 04/14/21  0506 04/13/21  2038 10/25/20  1145   POTASSIUM 4.2 4.3 4.3   CHLORIDE 114* 111* 111*   CO2 25 26 25   BUN 24 21 10   ANIONGAP 3 4 4     Recent Labs   Lab Test 04/14/21  0506 04/13/21  2038 10/25/20  1145 08/16/20  0400 08/16/20  0400 03/22/20  1950 03/22/20  1950 03/22/20  1914 12/22/19  1614   ALBUMIN 2.1* 2.6* 2.6*   < >  --    < >  --  2.6*  --    BILITOTAL 0.4 0.5 0.5   < >  --    < >  --  0.6  --    ALT 8 10 11   < >  --    < >  --  27  --    AST 7 10 17   < >  --    < >  --  101*  --    PROTEIN  --   --   --   --  Negative  --  10*  --  10*   LIPASE  --   --   --   --   --   --   --  54*  --     < > = values in this interval not displayed.       I reviewed the patient's new imaging results.        CONSULTATION ASSESSMENT AND PLAN:    Hermilo Gan is a 88 year old male with OPD, aortic stenosis status post AVR and bioprosthetic valve replacement, paroxysmal atrial  fibrillation and recurrent PE on Apixaban, prior lung cancer, and prior Non-Hodgkins Lymphoma and has been admitted with left lobe pneumonia, sepsis and acute on chronic anemia. GI consulted on 4/14/21.    Active Problems:    Anemia, unspecified type    Pneumonia of left lower lobe due to infectious organism    Elevated troponin    Patient has no evidence of GI bleed. Stools brown. Has chronic anemia due to multitude of medical problems as well as takes daily Apixiban. Has h/o prior GI bleed on at least 2 occassions. Last in 10/2020 noting bleeding angioectasias in colon.  He is being treated for left lobe pneumonia with zosyn and WBC trending down. Currently requiring 3 LPM. BP  systolic. Troponin elevated.  Patient likely has had drop in hemoglobin due to sepsis vs dilutional vs GI bleed Troponin likely elevated due to blood loss ischemia.  I have discussed with patient that given current pneumonia and elevated troponin would recommend to stabilize him from cardiorespiratory standpoint. I have discussed option of an EGD with possible colonoscopy during this hospitalization. Patient states he would prefer to avoid these procedures.     -- Will recommend to start clear liquid diet and advance as tolerated  -- Serial hemoglobin and transfuse for hemoglobin of 7 or less  -- Continue with IV pantoprazole 40 mg BID  -- Hold Apixiban   -- May consider cards consult with elevated troponin if EGD recommended              [facility].  Office: [phone]  Cell : [phone] (Dr. Florentino)  Cell: [phone] (Sammie Cox PA-C)

## 2021-04-14 NOTE — PLAN OF CARE
DATE & TIME: 4/14/21  Cognitive Concerns/ Orientation : A&O x4, pleasant, [place].    BEHAVIOR & AGGRESSION TOOL COLOR: Green   CIWA SCORE: NA   ABNL VS/O2: VSS x SBP range 's, on 3L. Reports wearing 2L at noc at home.   MOBILITY: Up with 1 with GB/walker, chair x1, turn/repos in bed.   PAIN MANAGMENT: Denies   DIET: ADAT, yoli clear liq, trying full liq for lunch.   BOWEL/BLADDER: Continent. No BM this shift.  ABNL LAB/BG: Hgb 5.8, trop 0.513.  DRAIN/DEVICES: R PIV x2  TELEMETRY RHYTHM: NA   SKIN: Intact, pale, scattered bruising on arms.   TESTS/PROCEDURES: NA   D/C DAY/GOALS/PLACE: Pend progress  OTHER IMPORTANT INFO: IV iron and 2 units PRBC tx, GI following, started on diet, Hgb q8h, no active s/s of bleeding, started on IV pepcid. Cards saw for elevated trop, no cardiac work up needed. LS exp wheeze, productive cough, sputum cancelled d/t contamination, need to re-collet, yoli 3L O2, receiving nebs and IV maxipime.

## 2021-04-14 NOTE — TELEPHONE ENCOUNTER
RN Triage:    Wife calling.    Hermilo has been in bed all day and hasn't eaten or had anything to drink all day.  Has not urinated.  Is weak and can't get out of bed, and can't stay sitting up in bed.  Is usually up and around.  Wife unsure if he is acting confused.  911 advised.    Divina Trevino RN 04/13/21 7:25 PM  [facility] Nurse Advisor        Additional Information    Negative: Severe difficulty breathing (e.g., struggling for each breath, speaks in single words)    Negative: Shock suspected (e.g., cold/pale/clammy skin, too weak to stand, low BP, rapid pulse)    Negative: Difficult to awaken or acting confused (e.g., disoriented, slurred speech)    Negative: Fainted > 15 minutes ago and still feels too weak or dizzy to stand    Negative: SEVERE weakness (i.e., unable to walk or barely able to walk, requires support) and new onset or worsening    Negative: Sounds like a life-threatening emergency to the triager    Negative: Weakness of the face, arm or leg on one side of the body    Negative: Has diabetes and weakness from low blood sugar (i.e., < 60 mg/dL or 3.5 mmol/L)    Negative: Recent heat exposure, suspected cause of weakness    Negative: Vomiting is the main symptom    Negative: Diarrhea is the main symptom    Negative: Difficulty breathing    Negative: Heart beating < 50 beats per minute OR > 140 beats per minute    Negative: Extra heartbeats OR irregular heart beating (i.e., 'palpitations')    Negative: Follows bleeding (e.g., from vomiting, rectum, vagina)    Negative: Bloody, black, or tarry bowel movements    Negative: MODERATE weakness from poor fluid intake with no improvement after 2 hours of rest and fluids    Drinking very little and dehydration suspected (e.g., no urine > 12 hours, very dry mouth, very lightheaded)    Protocols used: WEAKNESS (GENERALIZED) AND FATIGUE-A-OH

## 2021-04-14 NOTE — CONSULTS
[facility]    Cardiology Consultation     Date of Admission:  4/13/2021    Assessment & Plan   Hermilo Velasquez is a 88 year old male who was admitted on 4/13/2021.    1. Pneumonia with possible sepsis    2. COPD exacerbation with hypoxia, oxygen saturations down to 87% at home on room air.    3. Severe anemia, likely due to GI bleed and chronic iron deficiency. Hemoglobin down to 5.6 prior to transfusion.    4. History of bioprosthetic aortic valve replacement 2015. Coronary angiogram prior to surgery was completely normal with no evidence of coronary artery disease whatsoever. Echocardiogram today is normal showing normal bioprosthetic aortic valve function and normal left ventricular function without wall motion abnormality.    5. Minor troponin elevation with stable pattern, not consistent with acute coronary syndrome. Patient has no symptoms of angina. Likely due to severe anemia and hypoxia. Highly doubt underlying coronary artery disease.    6. History of postoperative atrial fibrillation. All recent ECGs on record show sinus rhythm. Based on this patient's chronic GI bleeding, I would not recommend anticoagulation for stroke prevention with atrial fibrillation. However, the patient also has a history of recurrent pulmonary emboli and may require anticoagulation for that indication.    Recommendations:    Transfusion of red blood cells and iron infusion underway. Correction of anemia should resolve the issue related to the minor troponin elevation.    Appropriate medical therapy for blood pressure control is recommended.    No further cardiac work-up recommended    Cardiology will sign off    RAI BOWDEN MD    Primary Care Physician   Karson Bishop    Reason for Consult   Reason for consult: I was asked by Dr. Stevenson to evaluate this patient for elevated troponin in the setting of severe anemia and pneumonia with COPD exacerbation and hypoxia.    History of Present Illness    Hermilo Velasquez is a 88 year old male who presents with generalized weakness, shaking chills, coughing, and decreased oxygen saturations. He normally uses oxygen only at night for treatment of chronic severe COPD. Over the last couple of days he has noticed his oxygen saturation declined from the low 90s on room air down to 87%. During this time he had episodes of shaking chills and was much more tired. He was unable to stand due to generalized weakness. He recalls one episode of black stools recently but generally has not noticed any black tarry stools or bleeding. He denies chest discomfort and lower extremity swelling.    During evaluation in the emergency room he was found to have an elevated procalcitonin consistent with a pneumonia and started on antibiotics. He was actively wheezing consistent with COPD exacerbation. For no clear reason, troponin levels were checked and found to be mildly elevated. However the pattern is flat, with troponin of 0.3, 0.5, and 0.5. ECG shows sinus rhythm with right bundle branch block and no ischemic changes. He has a history of bioprosthetic aortic valve replacement in 2015 but did not require bypass surgery or other revascularization at that time. His coronary angiogram prior to valve replacement showed normal coronary arteries (left dominant system).    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 His admission hemoglobin was 7.0, down from 8.7 in January. His hemoglobin subsequently dropped to 5.6 after several hours, but he still did not have chest pain symptoms. He is now receiving transfusion and iron therapy.    Patient Active Problem List   Diagnosis     Nonrheumatic aortic valve stenosis     GERD (gastroesophageal reflux disease)     Non Hodgkin's lymphoma (H): 2013     Microscopic hematuria     Health Care Home     CARDIOVASCULAR SCREENING; LDL GOAL LESS THAN 130     Anemia due to blood loss, acute     Paroxysmal atrial  fib -- on Eliquis      Need for SBE (subacute bacterial endocarditis) prophylaxis     Hx of Bilateral PE 2016 -- S/P IVC filter     Pulmonary nodules     Essential hypertension with goal blood pressure less than 140/90     Non-Hodgkin's lymphoma, unspecified body region, unspecified non-Hodgkin lymphoma type (H)     Other chronic pulmonary embolism without acute cor pulmonale (H)     Hyperlipidemia LDL goal <130     COPD exacerbation (H)     Iron deficiency anemia due to chronic blood loss     Spongiotic dermatitis     Nodule of lower lobe of right lung     Malignant neoplasm of lung, unspecified laterality, unspecified part of lung (H)     Bullous pemphigoid     Centrilobular emphysema (H)     Heart murmur, systolic     History of non-Hodgkin's lymphoma     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     History of aortic valve replacement with tissue graft     Anemia, iron deficiency     Respiratory failure with hypoxia and hypercapnia (H)     FRED (acute kidney injury) (H)     Gastric AVM's -- on EGD 8/17/20     CKD (chronic kidney disease) stage 3, GFR 30-59 ml/min     Gastrointestinal hemorrhage, unspecified gastrointestinal hemorrhage type     Anemia, unspecified type     Renal insufficiency     Elevated troponin     Pneumonia of left lower lobe due to infectious organism       Past Medical History   I have reviewed this patient's medical history and updated it with pertinent information if needed.   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 Vasques      Aortic stenosis     Severe AS, 9/2015 AVR - [facility] TRIFECTA Bovine bioprosthesis 25MM [id number]     Atrial fibrillation (H)     9/2015 Paroxysmal post op Afib - discharged on Warfarin and a beta blocker     COPD (chronic obstructive pulmonary disease) (H)      Deep vein thrombosis (H)      GERD (gastroesophageal reflux disease)      Heart murmur      Monoclonal gammopathy      plasmacyte prominent causing monoclonal gammopathy     Need for SBE (subacute bacterial endocarditis) prophylaxis      Neuropathy      Other and unspecified hyperlipidemia      Other malignant lymphomas     non hodgkin's lymphoma     RBBB (right bundle branch block)      Severe sepsis with acute organ dysfunction (H) 11/16/2015     Unspecified hereditary and idiopathic peripheral neuropathy        Past Surgical History   I have reviewed this patient's surgical history and updated it with pertinent information if needed.  Past Surgical History:   Procedure Laterality Date     APPENDECTOMY       AS TOTAL KNEE ARTHROPLASTY       BACK SURGERY       ESOPHAGOSCOPY, GASTROSCOPY, DUODENOSCOPY (EGD), COMBINED N/A 11/28/2015    Procedure: COMBINED ESOPHAGOSCOPY, GASTROSCOPY, DUODENOSCOPY (EGD);  Surgeon: Danis Castillo MD;  Location:  GI     ESOPHAGOSCOPY, GASTROSCOPY, DUODENOSCOPY (EGD), COMBINED N/A 7/26/2018    Procedure: COMBINED ESOPHAGOSCOPY, GASTROSCOPY, DUODENOSCOPY (EGD);;  Surgeon: Toby Dong DO;  Location:  GI     ESOPHAGOSCOPY, GASTROSCOPY, DUODENOSCOPY (EGD), COMBINED N/A 8/17/2020    Procedure: ESOPHAGOGASTRODUODENOSCOPY (EGD);  Surgeon: Herrera Henry MD;  Location:  GI     ESOPHAGOSCOPY, GASTROSCOPY, DUODENOSCOPY (EGD), COMBINED N/A 10/24/2020    Procedure: ESOPHAGOGASTRODUODENOSCOPY (EGD);  Surgeon: Vick Florentino MD;  Location:  GI     HERNIA REPAIR  2006     HERNIORRHAPHY VENTRAL  4/17/2013    Procedure: HERNIORRHAPHY VENTRAL;  VENTRAL HERNIA REPAIR WITH MESH;  Surgeon: Patel Guzman MD;  Location:  OR     KNEE SURGERY      arthroscopic right knee surgery      LOBECTOMY LUNG Right 3/26/2019    POSSIBLE LOBECTOMY LUNG per Dr. [facility]ques [facility]     REPAIR LIGAMENT ANKLE  2/23/2012    Procedure:REPAIR LIGAMENT ANKLE; LEFT TARSAL TUNNEL RELEASE OF KNOT OF ARIEL RELEASE; Surgeon:SAUL PUENTE; Location: OR     REPLACE VALVE AORTIC N/A 9/3/2015     Procedure: REPLACE VALVE AORTIC;  Surgeon: Antonino Mitchell MD;  Location: [facility] OR     ROTATOR CUFF REPAIR RT/LT      bilateral     SPINE SURGERY      3 spine surgeries     THORACOTOMY, WEDGE RESECTION LUNG, COMBINED Right 3/26/2019    RIGHT THORACOTOMY, WEDGE RESECTION, RIGHT LOWER LOBE LUNG NODULE,;  Surgeon: Jose A Vasques MD;  Location: [facility] OR     TONSILLECTOMY       TURP         Prior to Admission Medications   Prior to Admission Medications   Prescriptions Last Dose Informant Patient Reported? Taking?   DULoxetine (CYMBALTA) 30 MG capsule   Yes Yes   Sig: Take 60 mg by mouth daily   albuterol (PROAIR HFA/PROVENTIL HFA/VENTOLIN HFA) 108 (90 Base) MCG/ACT inhaler  Self No Yes   Sig: Inhale 1-2 puffs into the lungs every 6 hours as needed for shortness of breath / dyspnea or wheezing   apixaban ANTICOAGULANT (ELIQUIS ANTICOAGULANT) 5 MG tablet   No Yes   Sig: Take 1 tablet (5 mg) by mouth 2 times daily   atorvastatin (LIPITOR) 10 MG tablet   No Yes   Sig: Take 1 tablet (10 mg) by mouth daily   famotidine (PEPCID) 40 MG tablet   No Yes   Sig: Take 1 tablet (40 mg) by mouth 2 times daily   ferrous sulfate (FE TABS) 325 (65 Fe) MG EC tablet  Self No Yes   Sig: Take 1 tablet (325 mg) by mouth daily   gabapentin (NEURONTIN) 300 MG capsule   Yes Yes   Sig: Take 600 mg by mouth At Bedtime   hydrOXYzine (VISTARIL) 50 MG capsule  Self Yes Yes   Sig: Take 50 mg by mouth every 6 hours as needed for itching   metoprolol succinate ER (TOPROL-XL) 25 MG 24 hr tablet  Self No Yes   Sig: Take 0.5 tablets (12.5 mg) by mouth daily   pantoprazole (PROTONIX) 40 MG EC tablet  Self No Yes   Sig: Take 1 tablet (40 mg) by mouth 2 times daily (before meals)   tamsulosin (FLOMAX) 0.4 MG capsule   Yes Yes   Sig: Take 0.4 mg by mouth daily   tiotropium (SPIRIVA HANDIHALER) 18 MCG inhaled capsule  Self No Yes   Sig: Inhale 1 capsule (18 mcg) into the lungs daily   triamcinolone (KENALOG) 0.1 % external cream  Self No Yes   Sig:  "Apply topically 2 times daily as needed for irritation      Facility-Administered Medications Last Administration Doses Remaining   triamcinolone (KENALOG-40) injection 40 mg None recorded 1        Current Facility-Administered Medications   Medication Dose Route Frequency     sodium chloride 0.9%  1,000 mL Intravenous Once     azithromycin  250 mg Intravenous Q24H     ceFEPIme (MAXIPIME) IV  2 g Intravenous Q12H     famotidine  40 mg Intravenous Daily     ipratropium - albuterol 0.5 mg/2.5 mg/3 mL  3 mL Nebulization Q4H While awake     iron sucrose (VENOFER) intermittent infusion  300 mg Intravenous Daily     sodium chloride (PF)  3 mL Intracatheter Q8H     Current Facility-Administered Medications   Medication Last Rate     sodium chloride 60 mL/hr at 04/14/21 1157     Allergies   Allergies   Allergen Reactions     Lidocaine Blisters     Allergy to lidocaine ointment     Omeprazole Itching     Pantoprazole Itching     Prevacid [Lansoprazole] Itching     Lasix [Furosemide] Rash     Penicillins Rash     \"broke out from injection\" 60 yrs ago         Social History    reports that he quit smoking about 23 years ago. He has a 110.00 pack-year smoking history. He has never used smokeless tobacco. He reports current alcohol use. He reports that he does not use drugs.    Family History   Family History   Problem Relation Age of Onset     C.A.D. Father      Emphysema Father      Melanoma No family hx of      Skin Cancer No family hx of        Review of Systems   The comprehensive 10 point Review of Systems is negative other than noted in the HPI or here.     Physical Exam   Vital Signs with Ranges  Temp:  [97.3  F (36.3  C)-99.2  F (37.3  C)] 97.5  F (36.4  C)  Pulse:  [81-98] 97  Resp:  [15-56] 18  BP: ()/(41-65) 102/56  SpO2:  [90 %-100 %] 93 %  Wt Readings from Last 4 Encounters:   04/13/21 77.5 kg (170 lb 13.7 oz)   02/22/21 73 kg (161 lb)   01/21/21 74.4 kg (164 lb)   12/21/20 76.7 kg (169 lb 1.6 oz)     No " intake/output data recorded.      Vitals: /56   Pulse 97   Temp 97.5  F (36.4  C) (Oral)   Resp 18   Wt 77.5 kg (170 lb 13.7 oz)   SpO2 93%   BMI 24.52 kg/m      Constitutional: Awake, alert, cooperative, no apparent distress, and appears stated age.  Eyes: Lids and lashes normal, pupils equal, round and reactive to light, extra ocular muscles intact, sclera clear, conjunctiva normal.  ENT: Normocephalic, without obvious abnormality, atramatic,  external ears without lesions, oral pharynx with moist mucus membranes, , gums normal and good dentition.  Neck: Supple, symmetrical, trachea midline, no adenopathy, skin normal.  Lungs: No increased work of breathing, good air exchange, diffuse expiratory wheezes bilaterally, .  Cardiovascular: Regular rate and rhythm, normal S1 and S2, no S3 or S4, and no murmur noted.  Abdomen: No scars, normal bowel sounds, soft, non-distended, non-tender, no masses palpated, no hepatosplenomegally.  Musculoskeletal: No redness, warmth, or swelling of the joints.   Tone is normal.  Neurologic: Awake, alert, oriented to name, place and time.    Neuropsychiatric: Normal affect, mood, orientation, memory and insight.  Skin: No rashes, erythema, pallor, petechia or purpura.  No LE edema.    Recent Labs   Lab 04/14/21  0506 04/14/21  0039 04/13/21 2038   TROPI 0.513* 0.529* 0.331*       Recent Labs   Lab 04/14/21  0604 04/14/21  0506 04/14/21  0039 04/13/21 2038   WBC 17.3* 17.6*  --  24.6*   HGB 5.6* 5.8*  --  7.0*   MCV 82 82  --  81    244  --  290   NA  --  142  --  141   POTASSIUM  --  4.2  --  4.3   CHLORIDE  --  114*  --  111*   CO2  --  25  --  26   BUN  --  24  --  21   CR  --  1.34*  --  1.60*   GFRESTIMATED  --  47*  --  38*   GFRESTBLACK  --  54*  --  44*   ANIONGAP  --  3  --  4   AMRITA  --  7.0*  --  8.0*   GLC  --  113*  --  130*   ALBUMIN  --  2.1*  --  2.6*   PROTTOTAL  --  4.8*  --  6.1*   BILITOTAL  --  0.4  --  0.5   ALKPHOS  --  62  --  82   ALT  --  8   --  10   AST  --  7  --  10   TROPI  --  0.513* 0.529* 0.331*     Recent Labs   Lab Test 20  1144 17  0956 14  1006   CHOL 107 126 152   HDL 32* 44 48   LDL 53 69 90   TRIG 109 67 71   CHOLHDLRATIO  --   --  3.2     Recent Labs   Lab 21  0604 21  0506 21   WBC 17.3* 17.6* 24.6*   HGB 5.6* 5.8* 7.0*   HCT 20.6* 21.2* 25.3*   MCV 82 82 81    244 290   IRON  --  6*  --    IRONSAT  --  2*  --    FEB  --  298  --      No results for input(s): PH, PHV, PO2, PO2V, SAT, PCO2, PCO2V, HCO3, HCO3V in the last 168 hours.  Recent Labs   Lab 21  0506   NTBNPI 6,429*     No results for input(s): DD in the last 168 hours.  No results for input(s): SED, CRP in the last 168 hours.  Recent Labs   Lab 21  0604 21  0506 21    244 290     No results for input(s): TSH in the last 168 hours.  No results for input(s): COLOR, APPEARANCE, URINEGLC, URINEBILI, URINEKETONE, SG, UBLD, URINEPH, PROTEIN, UROBILINOGEN, NITRITE, LEUKEST, RBCU, WBCU in the last 168 hours.    Imaging:  Recent Results (from the past 48 hour(s))   XR Chest Port 1 View    Narrative    CHEST ONE VIEW PORTABLE   2021 9:20 PM     HISTORY: Fever, cough.    COMPARISON: 2020      Impression    IMPRESSION: Emphysematous appearing lungs. Patchy opacity in the left  lower lobe could represent developing pneumonia. No pneumothorax or  pleural effusion.    CHRISTINA MCKEON MD   Echocardiogram Complete    Narrative    [id number]  [id number]  [id number]  [id number]^ADDIE^BRISA^JONATHAN     [facility]  Echocardiography Laboratory  [address] [place] [id number]     Name: CLYDE RODRIGUEZ  MRN: [id number]  : 1932  Study Date: 2021 10:59 AM  Age: 88 yrs  Gender: Male  Patient Location: [facility]  Reason For Study: CHF  Ordering Physician: BRISA REAL  Referring Physician: ALISON DOZIER  Performed By: Cinthia Vo     BSA: 1.9 m2  Height: 70  in  Weight: 170 lb  HR: 88  BP: 96/46 mmHg  ______________________________________________________________________________  Procedure  Complete Portable Echo Adult.  ______________________________________________________________________________  Interpretation Summary     1. Left ventricular systolic function is normal. The visual ejection fraction  is estimated at 55-60%.  2. No regional wall motion abnormalities noted.  3. The right ventricle is normal in structure, function and size.  4. The left atrium is moderately dilated.  5. 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 The valve is poorly visualized.  There is increased gradients across the valve consistent with mild to moderate  prosthetic valve stenosis; mean gradient 25 mmHg, Peak velocity 3.3 m/sec,  calculated valve area 1.4 cm2.  7. In comparison with the prior study, the gradients across the bioprosthetic  aortic valve have increased (previously 12 mmHg).  ______________________________________________________________________________  Left Ventricle  The left ventricle is normal in size. There is normal left ventricular wall  thickness. Left ventricular systolic function is normal. The visual ejection  fraction is estimated at 55-60%. Diastolic Doppler findings (E/E' ratio and/or  other parameters) suggest left ventricular filling pressures are increased. No  regional wall motion abnormalities noted.     Right Ventricle  The right ventricle is normal in structure, function and size.     Atria  The left atrium is moderately dilated. The right atrium is moderately dilated.  There is no color Doppler evidence of an atrial shunt.     Mitral Valve  The mitral valve is normal in structure and function.     Tricuspid Valve  The tricuspid valve is normal in structure and function. There is trace  tricuspid regurgitation.     Aortic Valve  The aortic valve is not well visualized. There is a bioprosthetic aortic  valve.     Pulmonic Valve  The pulmonic valve is not  well seen, but is grossly normal.     Vessels  Normal size aorta. IVC diameter <2.1 cm collapsing >50% with sniff suggests a  normal RA pressure of 3 mmHg.     Pericardium  There is no pericardial effusion.     ______________________________________________________________________________  MMode/2D Measurements & Calculations  IVSd: 1.1 cm  LVIDd: 4.6 cm  LVIDs: 3.5 cm  LVPWd: 1.1 cm  FS: 24.2 %  LV mass(C)d: 179.0 grams  LV mass(C)dI: 91.9 grams/m2     Ao root diam: 3.6 cm  LA dimension: 4.2 cm  LA/Ao: 1.2  LVOT diam: 2.1 cm  LVOT area: 3.6 cm2  LA Volume (BP): 87.1 ml  LA Volume Index (BP): 44.7 ml/m2  RWT: 0.47     Doppler Measurements & Calculations  MV E max santi: 140.0 cm/sec  MV A max santi: 157.3 cm/sec  MV E/A: 0.89  MV max P.2 mmHg  MV mean P.3 mmHg  MV V2 VTI: 36.7 cm  MVA(VTI): 2.7 cm2  MV P1/2t max santi: 170.7 cm/sec  MV P1/2t: 49.7 msec  MVA(P1/2t): 4.4 cm2  MV dec slope: 1007 cm/sec2  MV dec time: 0.14 sec     Ao V2 max: 334.5 cm/sec  Ao max P.0 mmHg  Ao V2 mean: 234.1 cm/sec  Ao mean P.0 mmHg  Ao V2 VTI: 70.9 cm  АННА(I,D): 1.4 cm2  АННА(V,D): 1.5 cm2  LV V1 max P.7 mmHg  LV V1 max: 138.9 cm/sec  LV V1 VTI: 27.7 cm  SV(LVOT): 99.1 ml  SI(LVOT): 50.9 ml/m2  PA V2 max: 90.8 cm/sec  PA max PG: 3.3 mmHg  AV Santi Ratio (DI): 0.42  АННА Index (cm2/m2): 0.72  E/E' av.1  Lateral E/e': 11.6  Medial E/e': 20.6     ______________________________________________________________________________  Report approved by: Erika Perez 2021 12:14 PM             Echo:  No results found for this or any previous visit (from the past 4320 hour(s)).

## 2021-04-14 NOTE — PHARMACY-ADMISSION MEDICATION HISTORY
Pharmacy Medication History  Admission medication history interview status for the 4/13/2021  admission is complete. See EPIC admission navigator for prior to admission medications     Location of Interview: Patient room  Medication history sources: Surescripts, Care Everywhere and [facility]    Significant changes made to the medication list:  Added tamsulosin    In the past week, patient estimated taking medication this percent of the time: Karthikeyan    Additional medication history information:   Difficult time communicating with patient, unable to hear me. [facility] per [facility] fill history. Unknown last doses or compliance status    Medication reconciliation completed by provider prior to medication history? No    Time spent in this activity: 25 minutes    Prior to Admission medications    Medication Sig Last Dose Taking? Auth Provider   albuterol (PROAIR HFA/PROVENTIL HFA/VENTOLIN HFA) 108 (90 Base) MCG/ACT inhaler Inhale 1-2 puffs into the lungs every 6 hours as needed for shortness of breath / dyspnea or wheezing  Yes Karson Bishop MD   apixaban ANTICOAGULANT (ELIQUIS ANTICOAGULANT) 5 MG tablet Take 1 tablet (5 mg) by mouth 2 times daily  Yes Karson Bishop MD   atorvastatin (LIPITOR) 10 MG tablet Take 1 tablet (10 mg) by mouth daily  Yes Karson Bishop MD   DULoxetine (CYMBALTA) 30 MG capsule Take 60 mg by mouth daily  Yes Unknown, Entered By History   famotidine (PEPCID) 40 MG tablet Take 1 tablet (40 mg) by mouth 2 times daily  Yes Karson Bishop MD   ferrous sulfate (FE TABS) 325 (65 Fe) MG EC tablet Take 1 tablet (325 mg) by mouth daily  Yes Karson Bishop MD   gabapentin (NEURONTIN) 300 MG capsule Take 600 mg by mouth At Bedtime  Yes Unknown, Entered By History   hydrOXYzine (VISTARIL) 50 MG capsule Take 50 mg by mouth every 6 hours as needed for itching  Yes Unknown, Entered By History   metoprolol succinate ER (TOPROL-XL) 25 MG 24 hr tablet Take 0.5 tablets (12.5 mg) by mouth  daily  Yes Karson Bishop MD   pantoprazole (PROTONIX) 40 MG EC tablet Take 1 tablet (40 mg) by mouth 2 times daily (before meals)  Yes Karson Bishop MD   tamsulosin (FLOMAX) 0.4 MG capsule Take 0.4 mg by mouth daily  Yes Unknown, Entered By History   tiotropium (SPIRIVA HANDIHALER) 18 MCG inhaled capsule Inhale 1 capsule (18 mcg) into the lungs daily  Yes Karson Bishop MD   triamcinolone (KENALOG) 0.1 % external cream Apply topically 2 times daily as needed for irritation  Yes Karson Bishop MD       The information provided in this note is only as accurate as the sources available at the time of update(s)     Earlene Munroe, PharmD  Inpatient Clinical Pharmacist  [phone]

## 2021-04-14 NOTE — CONSULTS
Care Management Initial Consult    General Information  Assessment completed with: Patient         Primary Care Provider verified and updated as needed: Yes   Readmission within the last 30 days: no previous admission in last 30 days      Reason for Consult: discharge planning, other (see comments)  Advance Care Planning: Advance Care Planning Reviewed: education/resources on health care directives provided     General Information Comments: High readmission risk    Communication Assessment  Patient's communication style: spoken language (English or Bilingual)    Hearing Difficulty or Deaf: no   Wear Glasses or Blind: yes    Cognitive  Cognitive/Neuro/Behavioral: WDL                      Living Environment:   People in home: spouse     Current living Arrangements: house      Able to return to prior arrangements: other (see comments)(PT/OT consults pending, pt feels home care is not beneficial)       Family/Social Support:  Care provided by: self  Provides care for: no one  Marital Status:   Wife, Children          Description of Support System:   Pt reports his children and grandchildren are very helpful        Current Resources:   Patient receiving home care services: No     Community Resources: None  Equipment currently used at home: cane, straight       Employment/Financial:  Employment Status: retired     Employment/ Comments: Pt had a [employer] company  Financial Concerns: No concerns identified           Lifestyle & Psychosocial Needs:  Lifestyle     Physical activity     Days per week: 0 days     Minutes per session: 0 min     Stress: Not at all     Social Needs     Financial resource strain: Not hard at all     Food insecurity     Worry: Never true     Inability: Never true     Transportation needs     Medical: No     Non-medical: No     Socioeconomic History     Marital status:      Spouse name: Not on file     Number of children: Not on file     Years of education: Not on file      Highest education level: Not on file   Relationships     Social connections     Talks on phone: Twice a week     Gets together: Twice a week     Attends Buddhist service: Never     Active member of club or organization: No     Attends meetings of clubs or organizations: Never     Relationship status:      Intimate partner violence     Fear of current or ex partner: No     Emotionally abused: No     Physically abused: No     Forced sexual activity: No     Tobacco Use     Smoking status: Former Smoker     Packs/day: 2.00     Years: 55.00     Pack years: 110.00     Quit date: 1998     Years since quittin.2     Smokeless tobacco: Never Used   Substance and Sexual Activity     Alcohol use: Yes     Alcohol/week: 0.0 standard drinks     Comment: 1 drink per day     Drug use: No     Sexual activity: Not Currently     Partners: Female       Functional Status:  Prior to admission patient needed assistance:   Dependent ADLs:: Independent  Dependent IADLs:: Independent  Pt and spouse both drive    Mental Health Status:  Mental Health Status: No Current Concerns       Chemical Dependency Status:  Chemical Dependency Status: No Current Concerns             Values/Beliefs:  Spiritual, Cultural Beliefs, Yazidism Practices, Values that affect care:  No               Additional Information:    Will await PT/OT evaluations.  It is doubtful that pt will be open to having any services, as he noted he has had home care in the past and felt it was not of benefit to him.  Pt uses O2 at night via an Inogen.  Writer has checked with [facility], as pt was on there service in the past for home O2.  Pt then purchased an Inogen and wanted to use this instead of the home O2 set-up he had been using for nighttime use.  There was communication with pt from [facility]] concerning the Inogen would not work for mouth breathers and most people are mouth breathers in there sleep.  Pt then ended service with [facility] in October 2020.  Nursing should assess pt's breathing while asleep.  Pt does not have a home nebulizer.  He does feel the neb treatments he is currently receiving are helping.  Pt does not have a Pulmonologist.  He does use a rescue inhaler.    Ale Gonsales, RN   Inpatient Care Management  [phone]

## 2021-04-14 NOTE — PROVIDER NOTIFICATION
MD Notification    Notified Person: MD    Notified Person Name:  David     Notification Date/Time: 4/14/2021 0581    Notification Interaction: Phone     Purpose of Notification: Hgb 5.8 and Trops trending up.      Orders Received:  Recheck the CBC and trops are fairly flat.      Comments:

## 2021-04-14 NOTE — ED NOTES
DATE:  4/13/2021   TIME OF RECEIPT FROM LAB:  2139  LAB TEST:  troponin  LAB VALUE:  0.331  RESULTS GIVEN WITH READ-BACK TO (PROVIDER):  John West MD  TIME LAB VALUE REPORTED TO PROVIDER:   2139

## 2021-04-14 NOTE — ED TRIAGE NOTES
Patient BIBA from home, CC dyspnea.  EMS found patient 80% on room air; placed on NRB @ 15L with saturation improvement to 100% upon arrival.  Initial BP 60/30, improving upon ED arrival with 200ml IVF bolus and laying patient flat.  EMS reports temporal 102.

## 2021-04-14 NOTE — PROGRESS NOTES
Pt to receive first unit of PRBC. Label does not scan in epic, tried all known troubleshooting issues. Called blood bank, no issues noted, instructed to use downtime form. Please see form in chart. First unit transfused without complications.     Second unit PRBC barcodes not recognized. Charting completed on downtime form with first unit PRBC.

## 2021-04-14 NOTE — PROGRESS NOTES
Admission    Patient arrives to room 601-2 via cart from ED.  Care plan note:     Inpatient nursing criteria listed below were met:    PCD's Documented: Yes  Skin issues/needs documented :Yes  Isolation education started/completed NA  Patient allergies verified with patient: Yes  Verified completion of [place] Risk Assessment Tool:  Yes  Verified completion of Guardianship screening tool: Yes  Fall Prevention: Care plan updated, Education given and documented Yes  Care Plan initiated: Yes  Home medications documented in belongings flowsheet: NA  Patient belongings documented in belongings flowsheet: Yes  Reminder note (belongings/ medications) placed in discharge instructions:NA  Admission profile/ required documentation complete: Yes  Bedside Report Letter given and explained to patient No  Visitor Designated? No  If patient is a 72 hour hold/Commitment are belongings removed from room and locked up? NA

## 2021-04-14 NOTE — PROGRESS NOTES
Paged for AM HGB 5.8 in the absence of evident bleeding overnight. Unclear if genuine. Repeat HGB ordered for this AM.    Addendum: Repeat 5.6; will make him NPO, carefully order one unit transfusion packed RBC's, and will request GI consultation for further evaluation today.

## 2021-04-15 ENCOUNTER — APPOINTMENT (OUTPATIENT)
Dept: OCCUPATIONAL THERAPY | Facility: CLINIC | Age: 86
DRG: 871 | End: 2021-04-15
Attending: INTERNAL MEDICINE
Payer: COMMERCIAL

## 2021-04-15 LAB
ANION GAP SERPL CALCULATED.3IONS-SCNC: 2 MMOL/L (ref 3–14)
BUN SERPL-MCNC: 19 MG/DL (ref 7–30)
CALCIUM SERPL-MCNC: 7.8 MG/DL (ref 8.5–10.1)
CHLORIDE SERPL-SCNC: 112 MMOL/L (ref 94–109)
CO2 SERPL-SCNC: 26 MMOL/L (ref 20–32)
CREAT SERPL-MCNC: 0.98 MG/DL (ref 0.66–1.25)
ERYTHROCYTE [DISTWIDTH] IN BLOOD BY AUTOMATED COUNT: 15.5 % (ref 10–15)
GFR SERPL CREATININE-BSD FRML MDRD: 68 ML/MIN/{1.73_M2}
GLUCOSE SERPL-MCNC: 94 MG/DL (ref 70–99)
HCT VFR BLD AUTO: 27 % (ref 40–53)
HGB BLD-MCNC: 7.9 G/DL (ref 13.3–17.7)
MCH RBC QN AUTO: 24 PG (ref 26.5–33)
MCHC RBC AUTO-ENTMCNC: 29.3 G/DL (ref 31.5–36.5)
MCV RBC AUTO: 82 FL (ref 78–100)
PLATELET # BLD AUTO: 209 10E9/L (ref 150–450)
POTASSIUM SERPL-SCNC: 4.3 MMOL/L (ref 3.4–5.3)
RBC # BLD AUTO: 3.29 10E12/L (ref 4.4–5.9)
SODIUM SERPL-SCNC: 140 MMOL/L (ref 133–144)
WBC # BLD AUTO: 11.1 10E9/L (ref 4–11)

## 2021-04-15 PROCEDURE — 94640 AIRWAY INHALATION TREATMENT: CPT | Mod: 76

## 2021-04-15 PROCEDURE — 80048 BASIC METABOLIC PNL TOTAL CA: CPT | Performed by: INTERNAL MEDICINE

## 2021-04-15 PROCEDURE — 36415 COLL VENOUS BLD VENIPUNCTURE: CPT | Performed by: INTERNAL MEDICINE

## 2021-04-15 PROCEDURE — 97165 OT EVAL LOW COMPLEX 30 MIN: CPT | Mod: GO

## 2021-04-15 PROCEDURE — 120N000001 HC R&B MED SURG/OB

## 2021-04-15 PROCEDURE — 999N000157 HC STATISTIC RCP TIME EA 10 MIN

## 2021-04-15 PROCEDURE — 99233 SBSQ HOSP IP/OBS HIGH 50: CPT | Mod: GC | Performed by: INTERNAL MEDICINE

## 2021-04-15 PROCEDURE — 250N000013 HC RX MED GY IP 250 OP 250 PS 637: Performed by: INTERNAL MEDICINE

## 2021-04-15 PROCEDURE — 250N000009 HC RX 250: Performed by: HOSPITALIST

## 2021-04-15 PROCEDURE — 97535 SELF CARE MNGMENT TRAINING: CPT | Mod: GO

## 2021-04-15 PROCEDURE — 94640 AIRWAY INHALATION TREATMENT: CPT

## 2021-04-15 PROCEDURE — 85027 COMPLETE CBC AUTOMATED: CPT | Performed by: INTERNAL MEDICINE

## 2021-04-15 PROCEDURE — 250N000011 HC RX IP 250 OP 636: Performed by: HOSPITALIST

## 2021-04-15 PROCEDURE — 258N000003 HC RX IP 258 OP 636: Performed by: INTERNAL MEDICINE

## 2021-04-15 PROCEDURE — 250N000013 HC RX MED GY IP 250 OP 250 PS 637: Performed by: HOSPITALIST

## 2021-04-15 PROCEDURE — 250N000011 HC RX IP 250 OP 636: Performed by: INTERNAL MEDICINE

## 2021-04-15 RX ORDER — CALCIUM CARBONATE 500 MG/1
1000 TABLET, CHEWABLE ORAL EVERY 4 HOURS PRN
Status: DISCONTINUED | OUTPATIENT
Start: 2021-04-15 | End: 2021-04-16 | Stop reason: HOSPADM

## 2021-04-15 RX ORDER — FAMOTIDINE 20 MG/1
40 TABLET, FILM COATED ORAL 2 TIMES DAILY
Status: DISCONTINUED | OUTPATIENT
Start: 2021-04-15 | End: 2021-04-16 | Stop reason: HOSPADM

## 2021-04-15 RX ORDER — AZITHROMYCIN 250 MG/1
250 TABLET, FILM COATED ORAL DAILY
Status: DISCONTINUED | OUTPATIENT
Start: 2021-04-16 | End: 2021-04-15

## 2021-04-15 RX ORDER — CEFUROXIME AXETIL 500 MG/1
500 TABLET ORAL EVERY 12 HOURS SCHEDULED
Status: DISCONTINUED | OUTPATIENT
Start: 2021-04-15 | End: 2021-04-16 | Stop reason: HOSPADM

## 2021-04-15 RX ORDER — AZITHROMYCIN 250 MG/1
250 TABLET, FILM COATED ORAL DAILY
Status: DISCONTINUED | OUTPATIENT
Start: 2021-04-15 | End: 2021-04-16 | Stop reason: HOSPADM

## 2021-04-15 RX ADMIN — CALCIUM CARBONATE 1000 MG: 500 TABLET ORAL at 17:42

## 2021-04-15 RX ADMIN — APIXABAN 5 MG: 5 TABLET, FILM COATED ORAL at 20:25

## 2021-04-15 RX ADMIN — TAMSULOSIN HYDROCHLORIDE 0.4 MG: 0.4 CAPSULE ORAL at 08:06

## 2021-04-15 RX ADMIN — GABAPENTIN 600 MG: 300 CAPSULE ORAL at 21:21

## 2021-04-15 RX ADMIN — CALCIUM CARBONATE 1000 MG: 500 TABLET ORAL at 22:25

## 2021-04-15 RX ADMIN — AZITHROMYCIN MONOHYDRATE 250 MG: 250 TABLET ORAL at 11:30

## 2021-04-15 RX ADMIN — UMECLIDINIUM 1 PUFF: 62.5 AEROSOL, POWDER ORAL at 09:40

## 2021-04-15 RX ADMIN — Medication 140 MG: at 12:47

## 2021-04-15 RX ADMIN — ATORVASTATIN CALCIUM 10 MG: 10 TABLET, FILM COATED ORAL at 08:05

## 2021-04-15 RX ADMIN — FLUTICASONE FUROATE AND VILANTEROL TRIFENATATE 1 PUFF: 200; 25 POWDER RESPIRATORY (INHALATION) at 09:40

## 2021-04-15 RX ADMIN — IPRATROPIUM BROMIDE AND ALBUTEROL SULFATE 3 ML: .5; 3 SOLUTION RESPIRATORY (INHALATION) at 10:34

## 2021-04-15 RX ADMIN — METOPROLOL SUCCINATE 12.5 MG: 25 TABLET, EXTENDED RELEASE ORAL at 08:06

## 2021-04-15 RX ADMIN — IPRATROPIUM BROMIDE AND ALBUTEROL SULFATE 3 ML: .5; 3 SOLUTION RESPIRATORY (INHALATION) at 15:50

## 2021-04-15 RX ADMIN — FAMOTIDINE 40 MG: 20 TABLET ORAL at 20:25

## 2021-04-15 RX ADMIN — IPRATROPIUM BROMIDE AND ALBUTEROL SULFATE 3 ML: .5; 3 SOLUTION RESPIRATORY (INHALATION) at 20:09

## 2021-04-15 RX ADMIN — CEFUROXIME AXETIL 500 MG: 500 TABLET ORAL at 20:25

## 2021-04-15 RX ADMIN — DULOXETINE HYDROCHLORIDE 60 MG: 60 CAPSULE, DELAYED RELEASE ORAL at 08:06

## 2021-04-15 RX ADMIN — CEFUROXIME AXETIL 500 MG: 500 TABLET ORAL at 11:30

## 2021-04-15 RX ADMIN — FAMOTIDINE 40 MG: 20 TABLET ORAL at 08:05

## 2021-04-15 ASSESSMENT — ACTIVITIES OF DAILY LIVING (ADL)
ADLS_ACUITY_SCORE: 14
ADLS_ACUITY_SCORE: 16
ADLS_ACUITY_SCORE: 15
ADLS_ACUITY_SCORE: 14
ADLS_ACUITY_SCORE: 15
ADLS_ACUITY_SCORE: 14

## 2021-04-15 NOTE — PLAN OF CARE
4/14/21 8830-0288    Cognitive Concerns/ Orientation: Alert and oriented x 4  BEHAVIOR & AGGRESSION TOOL COLOR:  Green  ABNL VS/O2: VSS on 3 L nasal cannula - baseline 2L at home. Expiratory wheezes heard on auscultation  MOBILITY: Up with 1A; GB and walker. Not OOB this shift  PAIN MANAGMENT: Denies pain  DIET: Regular - tolerating well  BOWEL/BLADDER: Continent of B&B; urinal at bedside  ABNL LAB/BG: Trop 0.513 - MD aware, echo completed 4/14, cardiology saw and signed off; hgb 8.0 - recheck in AM  DRAIN/DEVICES: R PIV SL  TELEMETRY RHYTHM: N/A  SKIN: Intact with scattered bruising on BL forearms; blanchable redness on coccyx  TESTS/PROCEDURES: N/A  D/C DAY/GOALS/PLACE: Pending respiratory improvement.  OTHER IMPORTANT INFO: [place]. Dyspnic on exertion; bilateral wheezes - nebs q 4 while awake and IV ABX. GI following. No signs of bleeding. Recollect sputum sample - cup at bedside.

## 2021-04-15 NOTE — PLAN OF CARE
Summary: PNA and anemia   DATE & TIME: 4/14/21 7122-0461  Cognitive Concerns/ Orientation : A&O x4, pleasant, [place].    BEHAVIOR & AGGRESSION TOOL COLOR: Green   CIWA SCORE: NA   ABNL VS/O2: VSS on 3L. Reports wearing 2L at noc at home.   MOBILITY: Up with 1 with GB/walker, chair x1    PAIN MANAGMENT: Denies   DIET: Regular- tolerating  BOWEL/BLADDER: Continent   ABNL LAB/BG: Hgb 8, trop 0.513.  DRAIN/DEVICES: R PIV x1 SL  TELEMETRY RHYTHM: NA   SKIN: Intact, scattered bruising on arms.   TESTS/PROCEDURES: NA   D/C DAY/GOALS/PLACE: Pend progress  OTHER IMPORTANT INFO:  GI following, started on diet, Hgb q8h. Cards saw for trop, no cardiac work up needed. PT/OT ordered. LS exp wheeze, LAROSE, receiving nebs and IV maxipime, recollect sputum (none this shift), first sample contaminated.

## 2021-04-15 NOTE — PROGRESS NOTES
"   04/15/21 1700   Quick Adds   Type of Visit Initial Occupational Therapy Evaluation   Living Environment   People in home spouse   Current Living Arrangements house   Home Accessibility stairs to enter home;stairs within home   Number of Stairs, Main Entrance 2   Number of Stairs, Within Home, Primary 10  (to the basement)   Living Environment Comments All primary needs on main level, pt has basement but seldom uses it.   Self-Care   Usual Activity Tolerance moderate   Current Activity Tolerance fair   Regular Exercise No   Equipment Currently Used at Home raised toilet seat;cane, straight;walker, standard;grab bar, toilet   Activity/Exercise/Self-Care Comment Independent in I/ADLs, manages medications, wife does shopping, wife does laundry, pt completes yardwork . Pt is a poor historian and often states \"yeah\" but it is unclear whether pt heard/understood question.   Disability/Function   Hearing Difficulty or Deaf yes   Patient's preferred means of communication   (Pt appears to hear better with speaker on R side. )   Describe hearing loss bilateral hearing loss   Fall history within last six months no   Change in Functional Status Since Onset of Current Illness/Injury yes   General Information   Onset of Illness/Injury or Date of Surgery 04/13/21   Referring Physician Deana Stevenson MD   Patient/Family Therapy Goal Statement (OT) Patient wants to return home at OF   Additional Occupational Profile Info/Pertinent History of Current Problem Hermilo Velasquez is a markedly pleasant 88 year old gentleman former smoker with extensive past medical history that is most notable for COPD, aortic stenosis status post AVR and bioprosthetic valve replacement, paroxysmal atrial fibrillation and 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   Existing Precautions/Restrictions oxygen therapy device and L/min;fall  (2L/min baseline)   Limitations/Impairments hearing;safety/cognitive   General Observations and Info Pt does not appear to have full insight into safety hazards and is very [place]. Pt has poor endurance and desaturates with light activity.   Cognitive Status Examination   Orientation Status orientation to person, place and time   Affect/Mental Status (Cognitive) WFL  (Annoyed)   Follows Commands follows one-step commands;follows two-step commands;25-49% accuracy;repetition of directions required   Safety Deficit impulsivity;safety precautions awareness;moderate deficit;ability to follow commands   Executive Function Deficit insight/awareness of deficits   Cognitive Status Comments Pt demos increased impulsivity when frustrated or annoyed   Visual Perception   Visual Impairment/Limitations WFL   Sensory   Sensory Quick Adds Other (describe)   Sensory Comments 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   Pain Assessment   Patient Currently in Pain No   Integumentary/Edema   Integumentary/Edema other (describe)   Integumentary/Edema Comments BLE pitting edema present   Posture   Posture forward head position   Range of Motion Comprehensive   General Range of Motion bilateral upper extremity ROM WFL   Strength Comprehensive (MMT)   General Manual Muscle Testing (MMT) Assessment other (see comments)   Comment, General Manual Muscle Testing (MMT) Assessment   (Pt has poor endurance but no obvious limitations in BUE MMT )   Muscle Tone Assessment   Muscle Tone Quick Adds No deficits were identified   Coordination   Upper Extremity Coordination Left UE impaired;Right UE impaired  (Pt had difficulty with thumb to finger fine motor movements )   Fine Motor Coordination Difficulty with fingertip to thumb   Transfers   Transfers sit-stand transfer   Sit-Stand Transfer   Sit-Stand [place] (Transfers) supervision   Assistive Device (Sit-Stand Transfers) walker,  standard   Sit/Stand Transfer Comments Pt impulsive with transfers but verbalized knowing to feel for chair when stand>sit   Activities of Daily Living   BADL Assessment/Intervention upper body dressing;lower body dressing   Upper Body Dressing Assessment/Training   [place] Level (Upper Body Dressing) minimum assist (75% patient effort)   Position (Upper Body Dressing) unsupported sitting   Lower Body Dressing Assessment/Training   [place] Level (Lower Body Dressing) supervision   Comment (Lower Body Dressing) with socks only, pants not accessed at eval   Instrumental Activities of Daily Living (IADL)   Previous Responsibilities yardwork;medication management;driving   Clinical Impression   Criteria for Skilled Therapeutic Interventions Met (OT) yes   OT Diagnosis ADL/IADL impairment   OT Problem List-Impairments impacting ADL balance;activity tolerance impaired;hearing;coordination;strength   ADL comments/analysis Patient is able to complete dressing, functional ambulation, and transfers with SBA to min A but is limited by poor activity tolerance    Assessment of Occupational Performance 3-5 Performance Deficits   Identified Performance Deficits toileting, dynamic standing activities, IADLs   Planned Therapy Interventions (OT) ADL retraining;IADL retraining;cognition;strengthening;home program guidelines;progressive activity/exercise;risk factor education   Clinical Decision Making Complexity (OT) moderate complexity   Therapy Frequency (OT) Daily   Predicted Duration of Therapy 1 week   Anticipated Equipment Needs Upon Discharge (OT)   (Patient owns all necessary equipment and is planning on jessica)   Risk & Benefits of therapy have been explained evaluation/treatment results reviewed;care plan/treatment goals reviewed;risks/benefits reviewed;current/potential barriers reviewed;participants voiced agreement with care plan   OT Discharge Planning    OT Discharge Recommendation (DC Rec) home with home care  occupational therapy;Home with assist   OT Rationale for DC Rec Patient would benefit from continued endurance training and strengthening as he is was previously independent with ADLs/IADLs and is now below his PLOF. Pt reports wife is able to assist with IADLs as needed.   OT Brief overview of current status  Patient required encouragement to participate in therapy. Able to ambulate, transfer @ SBA. Pt is limited by SOB and fatigue after 5-10 minutes of activity.   Total Evaluation Time (Minutes)   Total Evaluation Time (Minutes) 10   Skin WDL   Skin WDL X   Skin Integrity/Characteristics bruised

## 2021-04-15 NOTE — PLAN OF CARE
DATE & TIME: 4/15/21   Cognitive Concerns/ Orientation: A&Ox4, [place].   BEHAVIOR & AGGRESSION TOOL COLOR: Green  ABNL VS/O2: VSS on 3 L nasal cannula - baseline 2L at night at home.   MOBILITY: Up with 1 assist with GB/walker, chair for meals. Turn/repos when in bed.   PAIN MANAGMENT: Denies pain  DIET: Regular - tolerating well  BOWEL/BLADDER: Continent of B&B; urinal at bedside.  ABNL LAB/BG: Hgb 7.9, recheck in AM.   DRAIN/DEVICES: None needed per MD.  TELEMETRY RHYTHM: N/A  SKIN: Intact with scattered bruising on BL forearms/R upper arm; blanchable redness on coccyx.  TESTS/PROCEDURES: N/A  D/C DAY/GOALS/PLACE: Pend progress  OTHER IMPORTANT INFO: Sputum sample needed if able to produce. Yellow sputum, unable to get into cup for sample. LS richard fine crackles in bases, yoli 3L, LAROSE, using IS ind, changed to PO abx. Hgb 7.9, changed to PO iron, GI following. PT/OT.

## 2021-04-15 NOTE — PROGRESS NOTES
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 GI consulted on 4/14/21.     Active Problems:    Anemia, unspecified type    Pneumonia of left lower lobe due to infectious organism    Elevated troponin  Patient has no evidence of GI bleed. Stools brown. Has chronic anemia due to multitude of medical problems as well as takes daily Apixiban. Has h/o prior GI bleed on at least 2 occassions. Last in 10/2020 noting bleeding angioectasias in colon.  He is being treated for left lobe pneumonia with zosyn and WBC trending down. Currently requiring 3 LPM. BP  systolic.   Patient likely has had drop in hemoglobin due to sepsis vs dilutional vs GI bleed Troponin likely elevated due to blood loss ischemia.  I have discussed with patient that given current pneumonia and elevated troponin would recommend to stabilize him from cardiorespiratory standpoint. I have discussed option of an EGD with possible colonoscopy during this hospitalization. Patient states he would prefer to avoid these procedures.   Hemoglobin stable at 8.0/7.9 over last 2 draws 12 hours apart. No signs of GI bleed.    -- Regular diet  -- Daily hemoglobin  -- Allergic to PPI- unable to start  -- Plan outpatient capsule endoscopy for anemia  -- If restart Apixiban will need to watch hemoglobin closely            Interval History:   no new complaints, doing well, denies chest pain, denies shortness of breath, denies abdominal pain, alert, oriented to person, place and time and doing well; no cp, sob, n/v/d, or abd  pain.              Review of Systems:   C: NEGATIVE for fever, chills, change in weight  E/M: NEGATIVE for ear, mouth and throat problems  R: NEGATIVE for significant cough or SOB  CV: NEGATIVE for chest pain, palpitations or peripheral edema             Medications:   I have reviewed this patient's current medications    sodium chloride 0.9%  1,000 mL Intravenous Once     atorvastatin  10 mg Oral Daily     azithromycin  250 mg Intravenous Q24H     ceFEPIme (MAXIPIME) IV  2 g Intravenous Q12H     DULoxetine  60 mg Oral Daily     famotidine  40 mg Oral Daily     fluticasone-vilanterol  1 puff Inhalation Daily     gabapentin  600 mg Oral At Bedtime     ipratropium - albuterol 0.5 mg/2.5 mg/3 mL  3 mL Nebulization Q4H While awake     iron sucrose (VENOFER) intermittent infusion  300 mg Intravenous Daily     metoprolol succinate ER  12.5 mg Oral Daily     sodium chloride (PF)  3 mL Intracatheter Q8H     tamsulosin  0.4 mg Oral Daily     umeclidinium  1 puff Inhalation Daily                  Physical Exam:   Vitals were reviewed  Vital Signs with Ranges  Temp:  [97.5  F (36.4  C)-98.3  F (36.8  C)] 98  F (36.7  C)  Pulse:  [70-97] 80  Resp:  [18-20] 20  BP: ()/(47-66) 115/66  SpO2:  [91 %-98 %] 91 %  I/O last 3 completed shifts:  In: 400 [P.O.:400]  Out: 975 [Urine:975]  Constitutional: healthy, alert and no distress   Cardiovascular: negative, PMI normal. No lifts, heaves, or thrills. RRR. No murmurs, clicks gallops or rub  Respiratory: negative, Percussion normal. Good diaphragmatic excursion. Lungs clear  Head: Normocephalic. No masses, lesions, tenderness or abnormalities  Neck: Neck supple. No adenopathy. Thyroid symmetric, normal size,, Carotids without bruits.  Abdomen: Abdomen soft, non-tender. BS normal. No masses, organomegaly           Data:   I reviewed the patient's new clinical lab test results.   Recent Labs   Lab Test 04/15/21  0813 04/14/21  1611 04/14/21  0604 04/14/21  0506 10/23/20  1617  10/23/20  1617 08/17/20  0948 08/17/20  0948 08/16/20  0829   WBC 11.1*  --  17.3* 17.6*   < > 8.0   < > 8.9 9.5   HGB 7.9* 8.0* 5.6* 5.8*   < > 6.6*   < > 7.7* 7.5*   MCV 82  --  82 82   < > 89   < > 82 81     --  224 244   < > 279   < > 273 272   INR  --   --   --   --   --  1.39*  --  1.21* 1.30*    < > = values in this interval not displayed.     Recent Labs   Lab Test 04/15/21  0813 04/14/21  0506 04/13/21  2038   POTASSIUM 4.3 4.2 4.3   CHLORIDE 112* 114* 111*   CO2 26 25 26   BUN 19 24 21   ANIONGAP 2* 3 4     Recent Labs   Lab Test 04/14/21  0506 04/13/21  2038 10/25/20  1145 08/16/20  0400 08/16/20  0400 03/22/20  1950 03/22/20  1950 03/22/20  1914 12/22/19  1614   ALBUMIN 2.1* 2.6* 2.6*   < >  --    < >  --  2.6*  --    BILITOTAL 0.4 0.5 0.5   < >  --    < >  --  0.6  --    ALT 8 10 11   < >  --    < >  --  27  --    AST 7 10 17   < >  --    < >  --  101*  --    PROTEIN  --   --   --   --  Negative  --  10*  --  10*   LIPASE  --   --   --   --   --   --   --  54*  --     < > = values in this interval not displayed.       I reviewed the patient's new imaging results.    All laboratory data reviewed  All imaging studies reviewed by me.    Sammie Cox PA-C,  4/15/2021  [facility]  Office : [phone]  Cell: [phone] (Dr. Florentino)  Cell: [phone] (Sammie Cox PA-C)

## 2021-04-15 NOTE — PROGRESS NOTES
[facility]    Hospitalist Progress Note    Date of Service (when I saw the patient): 04/15/2021    Assessment & Plan   Hermilo Velasquez is a 88 year old male who was admitted on 4/13/2021.  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     PLAN     Severe sepsis due to Left  lower lobe pneumonia causing acute COPD exacerbation:   We note that he has a history of lung adencarcinoma status post right lower lobe wedge resection in 2019. He also has COPD and at times in the past has required 2 L O2 to use at night; it is currently unclear to me if he continues to use that. He presents for shaking tremors which could have been rigors. He initially was hypotensive and requires 3 L O2 to keep sats above 90%. He has leukocytosis and elevated Lactate.Overall we suspect severe sepsis due to LLL pneumonia where an infiltrate is seen on CXR, leading to acute COPD exacerbation. IVF has improved the BP and repeate Lactate has normalized.  He is at risk for Pseudomonas.    Continue empiric antibiotics with Cefepime and Azithromycin (noting report of prior rash to Penicillins).   We will switch oral antibiotics to Ceftin twice daily and Zithromax daily   Duonebs scheduled q4 hours, Albuterol nebs every 2 hours as needed.. Monitor oxygenation. Robitussin ordered as needed for cough.  Added Breo Ellipta and Incruse inhalers to help with acute COPD exacerbation  Wheezing improved today with the addition of the inhalers no need for steroids   Patient is on 3 L  of oxygen by nasal cannula today and satting 94 to 98%  WBC count much improved from 24.6K on admission to 17.3K -11.1 Today  Procalcitonin was elevated at 2.3  Lactate improved from 2.6-1.1 today with hydration and antibiotics  Overall much improved since admission sepsis resolved  Stopped IV fluids today on 4/15/2021  Elevated troponin secondary to type II MI from demand ischemia in the setting of severe sepsis and left lower lobe pneumonia  Acute on chronic anemia  : We note that he has a history of TAVR and bioprosthetic valve replacement for Aortic Stenosis. TTE 3/2020 showed preserved LVEF with LVH. His myonecrosis currently could be due to demand ischemia from sepsis.    -- Telemetry, serial enzymes.  Troponins trended up 0.3-0.5  TTE done today showed EF of 55 to 60%.  Right ventricle is normal in structure function and size.  Left atrium is moderately dilated.  There is a bioprosthetic aortic valve valve is poorly visualized.  In comparison with the prior study the gradient across the bioprosthetic aortic valve have increased previously at 12 mm of which he currently increased to 25 mmHg  Cardiology consultation requested  Recommended medical management of the pneumonia and correction of the anemia  Cardiology currently signed off.  Appreciate assistance    Acute on chronic anemia while on anticoagulation with Eliquis;  Iron deficiency anemia with mixed picture  Patient's hemoglobin was low at 7 on admission  Hemoglobin dropped to 5.6 on 4/14/21  2 units of PRBCs ordered in the setting of elevated troponin  Follow hemoglobin closely  Eliquis on hold due to the worsening of anemia during this hospitalization  GI consultation requested  Patient would prefer to avoid EGD and colonoscopy during this hospitalization as per GI discussion with the patient  Patient's iron is low at the 6 ferritin is low at 18 TIBC is normal at 298 with a saturation index of 2  Started on IV iron daily   Hemoglobin is stable at  8-7.9 today  We will switch to oral iron daily today  Restart Eliquis today with close monitoring of hemoglobin    Suspected FRED: Cr 1.60 and was 1.09 on 10/2020. We suspect this could be due to hypovolemia.  Creatinine improved to 0.98-  1.34 from 1.60 on admission  Stopped IV fluids      PAF;  Restart Eliquis  Restarted low-dose Toprol-XL 12.5 mg p.o. daily     Recurrent PE: Noted  Restart Eliquis today     Dyslipidemia: Resumed Lipitor      Non-Hodgkins Lymphoma and MGUS: I am currently unsure of the status of these conditions. From what I can ascertain from a brief review of his extensive medical records, the cancer might seem currently to be in remission.     Acute on  Chronic anemia: Due to PATIENCE. HGB 7.0 and was 8.7 on 1/21/2021. 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     -- Monitor CBC closely while hospitalized and on DOAC    As noted above      Bullous pemphigoid and chronic pruritus: By history; noted.    Generalized weakness;  PT OT consultations requested     Rule Out COVID-19 infection  This patient was evaluated during a global COVID-19 pandemic, which necessitated consideration that the patient might be at risk for infection with the SARS-CoV-2 virus that causes COVID-19. Applicable protocols for evaluation were followed during the patient's care. Low suspicion for infection.   -negative COVID-19 PCR test result  -no current indication for precautions    DVT Prophylaxis: Pneumatic Compression Devices  Code Status: Full Code    Disposition: Expected discharge in 1-2 days after improvement of sepsis and pneumonia and hemoglobin stays stable.  PT OT consultation requested    Greater than 35 minutes were spent in reviewing the chart today, counseling the patient, collaboration of care    Discussed with bedside RN, patient,    Tried to reach his wife over the phone  Today left voicemail to call us back     eDana Stevenson MD  [phone] (P)      Interval History      Patient is resting comfortably in bed.  Denies any shortness of breath or chest pain currently.  Hemoglobin improved to 7.9 with the transfusion.  No active signs of bleeding since admission.  Hemoglobin drop could be dilutional as patient received aggressive IV fluid hydration on admission.  Restart Eliquis today      -Data reviewed today: I reviewed all new labs and imaging results over the last 24 hours. I personally reviewed no images or EKG's today.    Physical Exam   Temp: 98  F (36.7  C) Temp src: Oral BP: 115/66 Pulse: 80   Resp: 20 SpO2: 91 % O2 Device: Nasal cannula Oxygen Delivery: 3 LPM  Vitals:    04/13/21 2029 04/15/21 0629   Weight: 77.5 kg (170 lb 13.7 oz) 79.9 kg (176 lb 1.6 oz)     Vital Signs with Ranges  Temp:  [97.9  F (36.6  C)-98  F (36.7  C)] 98  F (36.7  C)  Pulse:  [70-85] 80  Resp:  [18-20] 20  BP: (106-120)/(54-66) 115/66  SpO2:  [91 %-98 %] 91 %  I/O last 3 completed shifts:  In: 400 [P.O.:400]  Out: 975 [Urine:975]    Constitutional: Awake, alert, cooperative, no apparent distress  Respiratory: Clear to auscultation today no wheezing  Cardiovascular: Regular rate and rhythm, normal S1 and S2, and no murmur noted  GI: Normal bowel sounds, soft, non-distended, non-tender  Skin/Integumen: No rashes, no cyanosis, no edema  Other:     Medications       sodium chloride 0.9%  1,000 mL Intravenous Once     apixaban ANTICOAGULANT  5 mg Oral BID     atorvastatin  10 mg Oral Daily     azithromycin  250 mg Oral Daily     cefuroxime  500 mg Oral Q12H [facility]     DULoxetine  60 mg Oral Daily     famotidine  40 mg Oral BID     ferrous sulfate  140 mg Oral Daily     fluticasone-vilanterol  1 puff Inhalation Daily     gabapentin  600 mg Oral At Bedtime     ipratropium - albuterol 0.5 mg/2.5 mg/3 mL  3 mL Nebulization Q4H While awake     metoprolol succinate ER  12.5  mg Oral Daily     sodium chloride (PF)  3 mL Intracatheter Q8H     tamsulosin  0.4 mg Oral Daily     umeclidinium  1 puff Inhalation Daily       Data   Recent Labs   Lab 04/15/21  0813 04/14/21  1611 04/14/21  0604 04/14/21  0506 04/14/21  0039 04/13/21 2038   WBC 11.1*  --  17.3* 17.6*  --  24.6*   HGB 7.9* 8.0* 5.6* 5.8*  --  7.0*   MCV 82  --  82 82  --  81     --  224 244  --  290     --   --  142  --  141   POTASSIUM 4.3  --   --  4.2  --  4.3   CHLORIDE 112*  --   --  114*  --  111*   CO2 26  --   --  25  --  26   BUN 19  --   --  24  --  21   CR 0.98  --   --  1.34*  --  1.60*   ANIONGAP 2*  --   --  3  --  4   AMRITA 7.8*  --   --  7.0*  --  8.0*   GLC 94  --   --  113*  --  130*   ALBUMIN  --   --   --  2.1*  --  2.6*   PROTTOTAL  --   --   --  4.8*  --  6.1*   BILITOTAL  --   --   --  0.4  --  0.5   ALKPHOS  --   --   --  62  --  82   ALT  --   --   --  8  --  10   AST  --   --   --  7  --  10   TROPI  --   --   --  0.513* 0.529* 0.331*       No results found for this or any previous visit (from the past 24 hour(s)).

## 2021-04-15 NOTE — PLAN OF CARE
PT: Pt refusing PT at this time. PT discussed the importance and benefits of mobility and pt continued to refuse PT.

## 2021-04-16 ENCOUNTER — APPOINTMENT (OUTPATIENT)
Dept: PHYSICAL THERAPY | Facility: CLINIC | Age: 86
DRG: 871 | End: 2021-04-16
Attending: INTERNAL MEDICINE
Payer: COMMERCIAL

## 2021-04-16 VITALS
RESPIRATION RATE: 20 BRPM | SYSTOLIC BLOOD PRESSURE: 108 MMHG | BODY MASS INDEX: 24.28 KG/M2 | OXYGEN SATURATION: 92 % | WEIGHT: 169.6 LBS | DIASTOLIC BLOOD PRESSURE: 61 MMHG | HEART RATE: 81 BPM | HEIGHT: 70 IN | TEMPERATURE: 97.9 F

## 2021-04-16 LAB
ERYTHROCYTE [DISTWIDTH] IN BLOOD BY AUTOMATED COUNT: 16 % (ref 10–15)
HCT VFR BLD AUTO: 26.8 % (ref 40–53)
HGB BLD-MCNC: 7.9 G/DL (ref 13.3–17.7)
MCH RBC QN AUTO: 24.2 PG (ref 26.5–33)
MCHC RBC AUTO-ENTMCNC: 29.5 G/DL (ref 31.5–36.5)
MCV RBC AUTO: 82 FL (ref 78–100)
PLATELET # BLD AUTO: 240 10E9/L (ref 150–450)
RBC # BLD AUTO: 3.27 10E12/L (ref 4.4–5.9)
WBC # BLD AUTO: 9.9 10E9/L (ref 4–11)

## 2021-04-16 PROCEDURE — 250N000013 HC RX MED GY IP 250 OP 250 PS 637: Performed by: INTERNAL MEDICINE

## 2021-04-16 PROCEDURE — 94640 AIRWAY INHALATION TREATMENT: CPT

## 2021-04-16 PROCEDURE — 97161 PT EVAL LOW COMPLEX 20 MIN: CPT | Mod: GP | Performed by: PHYSICAL THERAPIST

## 2021-04-16 PROCEDURE — 85027 COMPLETE CBC AUTOMATED: CPT | Performed by: INTERNAL MEDICINE

## 2021-04-16 PROCEDURE — 250N000009 HC RX 250: Performed by: HOSPITALIST

## 2021-04-16 PROCEDURE — 97530 THERAPEUTIC ACTIVITIES: CPT | Mod: GP | Performed by: PHYSICAL THERAPIST

## 2021-04-16 PROCEDURE — 36415 COLL VENOUS BLD VENIPUNCTURE: CPT | Performed by: INTERNAL MEDICINE

## 2021-04-16 PROCEDURE — 99239 HOSP IP/OBS DSCHRG MGMT >30: CPT | Performed by: INTERNAL MEDICINE

## 2021-04-16 PROCEDURE — 250N000013 HC RX MED GY IP 250 OP 250 PS 637: Performed by: HOSPITALIST

## 2021-04-16 PROCEDURE — 97116 GAIT TRAINING THERAPY: CPT | Mod: GP | Performed by: PHYSICAL THERAPIST

## 2021-04-16 PROCEDURE — 94640 AIRWAY INHALATION TREATMENT: CPT | Mod: 76

## 2021-04-16 PROCEDURE — 97110 THERAPEUTIC EXERCISES: CPT | Mod: GP | Performed by: PHYSICAL THERAPIST

## 2021-04-16 RX ORDER — IPRATROPIUM BROMIDE AND ALBUTEROL SULFATE 2.5; .5 MG/3ML; MG/3ML
3 SOLUTION RESPIRATORY (INHALATION) 4 TIMES DAILY
Qty: 360 ML | Refills: 0 | Status: SHIPPED | OUTPATIENT
Start: 2021-04-16 | End: 2021-05-17

## 2021-04-16 RX ORDER — AZITHROMYCIN 250 MG/1
250 TABLET, FILM COATED ORAL DAILY
Qty: 3 TABLET | Refills: 0 | Status: SHIPPED | OUTPATIENT
Start: 2021-04-17 | End: 2021-04-20

## 2021-04-16 RX ORDER — BUMETANIDE 0.25 MG/ML
2 INJECTION INTRAMUSCULAR; INTRAVENOUS ONCE
Status: DISCONTINUED | OUTPATIENT
Start: 2021-04-16 | End: 2021-04-16

## 2021-04-16 RX ORDER — CEFUROXIME AXETIL 500 MG/1
500 TABLET ORAL EVERY 12 HOURS
Qty: 10 TABLET | Refills: 0 | Status: SHIPPED | OUTPATIENT
Start: 2021-04-16 | End: 2021-04-21

## 2021-04-16 RX ORDER — BUMETANIDE 1 MG/1
2 TABLET ORAL ONCE
Status: COMPLETED | OUTPATIENT
Start: 2021-04-16 | End: 2021-04-16

## 2021-04-16 RX ADMIN — Medication 140 MG: at 08:45

## 2021-04-16 RX ADMIN — METOPROLOL SUCCINATE 12.5 MG: 25 TABLET, EXTENDED RELEASE ORAL at 08:45

## 2021-04-16 RX ADMIN — UMECLIDINIUM 1 PUFF: 62.5 AEROSOL, POWDER ORAL at 08:49

## 2021-04-16 RX ADMIN — CEFUROXIME AXETIL 500 MG: 500 TABLET ORAL at 08:45

## 2021-04-16 RX ADMIN — BUMETANIDE 2 MG: 1 TABLET ORAL at 11:53

## 2021-04-16 RX ADMIN — SENNOSIDES AND DOCUSATE SODIUM 1 TABLET: 8.6; 5 TABLET ORAL at 10:53

## 2021-04-16 RX ADMIN — DULOXETINE HYDROCHLORIDE 60 MG: 60 CAPSULE, DELAYED RELEASE ORAL at 08:45

## 2021-04-16 RX ADMIN — FLUTICASONE FUROATE AND VILANTEROL TRIFENATATE 1 PUFF: 200; 25 POWDER RESPIRATORY (INHALATION) at 08:49

## 2021-04-16 RX ADMIN — AZITHROMYCIN MONOHYDRATE 250 MG: 250 TABLET ORAL at 08:45

## 2021-04-16 RX ADMIN — IPRATROPIUM BROMIDE AND ALBUTEROL SULFATE 3 ML: .5; 3 SOLUTION RESPIRATORY (INHALATION) at 11:04

## 2021-04-16 RX ADMIN — APIXABAN 5 MG: 5 TABLET, FILM COATED ORAL at 08:45

## 2021-04-16 RX ADMIN — ATORVASTATIN CALCIUM 10 MG: 10 TABLET, FILM COATED ORAL at 08:45

## 2021-04-16 RX ADMIN — TAMSULOSIN HYDROCHLORIDE 0.4 MG: 0.4 CAPSULE ORAL at 08:45

## 2021-04-16 RX ADMIN — FAMOTIDINE 40 MG: 20 TABLET ORAL at 08:45

## 2021-04-16 RX ADMIN — IPRATROPIUM BROMIDE AND ALBUTEROL SULFATE 3 ML: .5; 3 SOLUTION RESPIRATORY (INHALATION) at 07:22

## 2021-04-16 ASSESSMENT — MIFFLIN-ST. JEOR: SCORE: 1445.55

## 2021-04-16 ASSESSMENT — ACTIVITIES OF DAILY LIVING (ADL)
ADLS_ACUITY_SCORE: 15

## 2021-04-16 NOTE — PROGRESS NOTES
I certify that this patient, Hermilo Velasquez has been under my care (or a nurse practitioner or physican's assistant working with me). This is the face-to-face encounter for oxygen medical necessity.      Hermilo Velasquez is now in a chronic stable state and continues to require supplemental oxygen. Patient has continued oxygen desaturation due to COPD J44.9.    Alternative treatment(s) tried or considered and deemed clinically infective for treatment of COPD J44.9 include nebulizers, inhalers and pulmonary toileting.  If portability is ordered, is the patient mobile within the home? yes    **Patients who qualify for home O2 coverage under the CMS guidelines require ABG tests or O2 sat readings obtained closest to, but no earlier than 2 days prior to the discharge, as evidence of the need for home oxygen therapy. Testing must be performed while patient is in the chronic stable state. See notes for O2 sats      Patient will need a Nebulizer for treatment of COPD as he continuous to need NEB treatments

## 2021-04-16 NOTE — PROGRESS NOTES
sPatient has been assessed for Home Oxygen needs. Oxygen readings:    *Pulse oximetry (SpO2) = 84% on room air at rest while awake.    *SpO2 improved to 92% on 3liters/minute at rest.    *SpO2 = 78% on room air during activity/with exercise.    *SpO2 improved to 95% on 4liters/minute during activity/with exercise.

## 2021-04-16 NOTE — PROGRESS NOTES
"   04/16/21 0800   Quick Adds   Type of Visit Initial PT Evaluation   Living Environment   People in home spouse   Current Living Arrangements house   Home Accessibility stairs to enter home;stairs within home   Number of Stairs, Main Entrance 2   Number of Stairs, Within Home, Primary 10   Stair Railings, Within Home, Primary railing on right side (ascending)   Living Environment Comments All primary needs on main level, pt has basement but seldom uses it.   Self-Care   Usual Activity Tolerance moderate   Current Activity Tolerance poor   Regular Exercise No   Equipment Currently Used at Home raised toilet seat;cane, straight;walker, standard;grab bar, toilet   Activity/Exercise/Self-Care Comment Independent in I/ADLs, manages medications, wife does shopping, wife does laundry, pt completes yardwork . Pt is a poor historian and often states \"yeah\" but it is unclear whether pt heard/understood question.   Disability/Function   Hearing Difficulty or Deaf yes   Describe hearing loss bilateral hearing loss   Walking or Climbing Stairs Difficulty no   Dressing/Bathing Difficulty no   Toileting issues no   Doing Errands Independently Difficulty (such as shopping) other (see comments)  (A from spouse)   Fall history within last six months no   Change in Functional Status Since Onset of Current Illness/Injury yes   General Information   Onset of Illness/Injury or Date of Surgery 04/14/21   Referring Physician Deana Stevenson   Patient/Family Therapy Goals Statement (PT) Pt would like to discharge to home   Pertinent History of Current Problem (include personal factors and/or comorbidities that impact the POC) Hermilo Velasquez is a markedly pleasant 88 year old gentleman former smoker with extensive past medical history that is most notable for COPD, aortic stenosis status post AVR and bioprosthetic valve replacement, paroxysmal atrial fibrillation and recurrent PE on Apixaban, prior lung cancer, and prior Non-Hodgkins " Lymphoma, among multiple others; who presents with rigors and dyspnea and is found to have severe sepsis due to left  lower lobe pneumonia causing acute COPD exacerbation.   Existing Precautions/Restrictions fall;oxygen therapy device and L/min  (on 4 LPM)   Weight-Bearing Status - LUE full weight-bearing   Weight-Bearing Status - RUE full weight-bearing   Weight-Bearing Status - LLE full weight-bearing   Weight-Bearing Status - RLE full weight-bearing   Cognition   Orientation Status (Cognition) oriented x 3   Affect/Mental Status (Cognition) WFL   Follows Commands (Cognition) WFL   Safety Deficit (Cognition) impulsivity;safety precautions awareness;safety precautions follow-through/compliance   Memory Deficit (Cognition) recall, recent events   Pain Assessment   Patient Currently in Pain No   Range of Motion (ROM)   ROM Quick Adds ROM WFL   Strength   Strength Comments limited mm endurance for functional mob , I sit to stand    Bed Mobility   Comment (Bed Mobility) I bed mob    Transfers   Transfer Safety Comments sit to stand with FWW with cues for safety   Gait/Stairs (Locomotion)   [place] Level (Gait) contact guard   Assistive Device (Gait) walker, front-wheeled   Distance in Feet (Required for LE Total Joints) 220'   Pattern (Gait) swing-through   Comment (Gait/Stairs) SOB, on 6lpm for amb also wearing mask   Balance   Balance Comments decreased standing dynamic balance requires B UE support   Clinical Impression   Criteria for Skilled Therapeutic Intervention yes, treatment indicated   PT Diagnosis (PT) difficulty walking   Influenced by the following impairments decreased strength, balance and SOB   Functional limitations due to impairments impaired functional mob I and safety   Clinical Presentation Stable/Uncomplicated   Clinical Presentation Rationale clinical judgement   Clinical Decision Making (Complexity) low complexity   Therapy Frequency (PT) Daily   Predicted Duration of Therapy Intervention  (days/wks) 3 days   Planned Therapy Interventions (PT) gait training;stair training;transfer training;strengthening   Risk & Benefits of therapy have been explained evaluation/treatment results reviewed;care plan/treatment goals reviewed;risks/benefits reviewed;current/potential barriers reviewed;participants voiced agreement with care plan;participants included;patient   PT Discharge Planning    PT Discharge Recommendation (DC Rec) home with assist   PT Rationale for DC Rec Anticipate with cont inpt therapy pt will return to baseline I with mob and self cares, spouse to A for shopping and laundry as prior, pt on 2 lpm at home, mostly at night   PT Brief overview of current status  Pt currently requires CGA to SBA for amb, on 6 lpm, sats 92-94% after amb. amb 220' in [place], yoli LE ex in sitting with O2 at 4 lpm, sats 91-92%   Total Evaluation Time   Total Evaluation Time (Minutes) 10

## 2021-04-16 NOTE — DISCHARGE SUMMARY
[facility]  Discharge Summary        Hermilo Velasquez MRN# [id number]   YOB: 1932 Age: 88 year old     Date of Admission:  4/13/2021  Date of Discharge:  4/16/2021  Admitting Physician:  No admitting provider for patient encounter.  Discharge Physician: Deana Stevenson MD  Discharging Service: Hospitalist     Primary Provider: Karson Bishop  Primary Care Physician Phone Number: [phone]         Discharge Diagnoses/Problem Oriented Hospital Course (Providers):    Hermilo Velasquez was admitted on 4/13/2021 by No admitting provider for patient encounter. and I would refer you to their history and physical.  The following problems were addressed during his hospitalization:  Assessment & Plan     Hermilo Velasquez is a 88 year old male who was admitted on 4/13/2021.  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     PLAN     Severe sepsis due to Left  lower lobe pneumonia causing acute COPD exacerbation:   We note that he has a history of lung adencarcinoma status post right lower lobe wedge resection in 2019. He also has COPD and at times in the past has required 2 L O2 to use at night; it is currently unclear to me if he continues to use that. He presents for shaking tremors which could have been rigors. He initially was hypotensive and requires 3 L O2 to keep sats above 90%. He has leukocytosis and elevated Lactate.Overall we suspect severe sepsis due to LLL pneumonia where an infiltrate is seen on CXR, leading to acute COPD  exacerbation. IVF has improved the BP and repeate Lactate has normalized.  He is at risk for Pseudomonas.    Continue empiric antibiotics with Cefepime and Azithromycin (noting report of prior rash to Penicillins).   We will switch oral antibiotics to Ceftin twice daily and Zithromax daily   Duonebs scheduled q4 hours, Albuterol nebs every 2 hours as needed.. Monitor oxygenation. Robitussin ordered as needed for cough.  Added Breo Ellipta and Incruse inhalers to help with acute COPD exacerbation  Wheezing improved today with the addition of the inhalers no need for steroids   Patient is on 3 L of oxygen by nasal cannula today and satting 94 to 98%  WBC count much improved from 24.6K on admission to 17.3K -11.1 Today  Procalcitonin was elevated at 2.3  Lactate improved from 2.6-1.1  with hydration and antibiotics  Overall much improved since admission sepsis resolved  Stopped IV fluids today on 4/15/2021  We will discharge home with oral Ceftin and Zithromax  Elevated troponin secondary to type II MI from demand ischemia in the setting of severe sepsis and left lower lobe pneumonia  Acute on chronic anemia  : We note that he has a history of TAVR and bioprosthetic valve replacement for Aortic Stenosis. TTE 3/2020 showed preserved LVEF with LVH. His myonecrosis currently could be due to demand ischemia from sepsis.    -- Telemetry, serial enzymes.  Troponins trended up 0.3-0.5  TTE done during this hospitalization showed EF of 55 to 60%.  Right ventricle is normal in structure function and size.  Left atrium is moderately dilated.  There is a bioprosthetic aortic valve valve is poorly visualized.  In comparison with the prior study the gradient across the bioprosthetic aortic valve have increased previously at 12 mm of which he currently increased to 25 mmHg  Cardiology consultation requested  Recommended medical management of the pneumonia and correction of the anemia  Cardiology currently signed off.  Appreciate  assistance     Acute on chronic anemia while on anticoagulation with Eliquis;  Iron deficiency anemia with mixed picture  Patient's hemoglobin was low at 7 on admission  Hemoglobin dropped to 5.6 on 4/14/21  2 units of PRBCs ordered in the setting of elevated troponin  Follow hemoglobin closely  Eliquis held due to the worsening of anemia during this hospitalization  GI consultation requested  Patient would prefer to avoid EGD and colonoscopy during this hospitalization as per GI discussion with the patient  Patient's iron is low at the 6 ferritin is low at 18 TIBC is normal at 298 with a saturation index of 2  Started on IV iron during hospitalization    Hemoglobin is stable at 8-7.9-7.9  today    Restarted  Eliquis on 4/15/2021  Recheck CBC in 4 days  As per GI  Plan outpatient capsule endoscopy for anemia    Suspected FRED: Cr 1.60 and was 1.09 on 10/2020. We suspect this could be due to hypovolemia.  Creatinine improved to 0.98-  1.34 from 1.60 on admission  Stopped IV fluids      PAF;  Restarted  Eliquis  Restarted low-dose Toprol-XL 12.5 mg p.o. daily     Recurrent PE: Noted  Restart Eliquis on 4/15/2021     Dyslipidemia: Resumed Lipitor      Non-Hodgkins Lymphoma and MGUS: I am currently unsure of the status of these conditions. From what I can ascertain from a brief review of his extensive medical records, the cancer might seem currently to be in remission.     Acute on  Chronic anemia: Due to PATIENCE. HGB 7.0 and was 8.7 on 1/21/2021. 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     -- Monitor CBC closely while hospitalized and on DOAC     As noted above      Bullous pemphigoid and chronic pruritus: By history; noted.     Generalized weakness;  PT OT consultations requested     Rule Out COVID-19 infection  This patient was evaluated during  a global COVID-19 pandemic, which necessitated consideration that the patient might be at risk for infection with the SARS-CoV-2 virus that causes COVID-19. Applicable protocols for evaluation were followed during the patient's care. Low suspicion for infection.   -negative COVID-19 PCR test result  -no current indication for precautions     DVT Prophylaxis: Pneumatic Compression Devices  Code Status: Full Code     Disposition:  Patient refused to go to TCU he wants to go home he refused home care services and refused home oxygen.  As he has a different set up off oxygen at home       greater than 35 minutes were spent in reviewing the chart today, counseling the patient, collaboration of care     Discussed with bedside RN, patient and care coordinator today    Deana Stevenson MD  [phone] (P)              Code Status:      Full Code        Brief Hospital Stay Summary Sent Home With Patient in AVS:        Reason for your hospital stay      Sepsis secondary to Pneumonia and Anemia                 Important Results:      See below        Pending Results:        Unresulted Labs Ordered in the Past 30 Days of this Admission     Date and Time Order Name Status Description    4/13/2021 2033 Blood culture Preliminary     4/13/2021 2033 Blood culture Preliminary             Discharge Instructions and Follow-Up:      Follow-up Appointments     Follow-up and recommended labs and tests       Follow up with primary care provider, Karson Bishop, within 7 days for   hospital follow- up.  The following labs/tests are recommended: recheck   CBC in 4days .               Discharge Disposition:      Discharged to home        Discharge Medications:        Current Discharge Medication List      START taking these medications    Details   azithromycin (ZITHROMAX) 250 MG tablet Take 1 tablet (250 mg) by mouth daily for 3 days  Qty: 3 tablet, Refills: 0    Associated Diagnoses: Pneumonia of left lower lobe due to infectious organism       cefuroxime (CEFTIN) 500 MG tablet Take 1 tablet (500 mg) by mouth every 12 hours for 5 days  Qty: 10 tablet, Refills: 0    Associated Diagnoses: Pneumonia of left lower lobe due to infectious organism; COPD exacerbation (H)      fluticasone-vilanterol (BREO ELLIPTA) 200-25 MCG/INH inhaler Inhale 1 puff into the lungs daily  Qty: 1 each, Refills: 0    Associated Diagnoses: COPD exacerbation (H)      guaiFENesin (ROBITUSSIN) 20 mg/mL SOLN solution Take 10 mLs by mouth every 4 hours as needed for other (secretion expectorant)  Qty: 118 mL, Refills: 0    Associated Diagnoses: COPD exacerbation (H)      ipratropium - albuterol 0.5 mg/2.5 mg/3 mL (DUONEB) 0.5-2.5 (3) MG/3ML neb solution Take 1 vial (3 mLs) by nebulization 4 times daily  Qty: 360 mL, Refills: 0    Associated Diagnoses: COPD exacerbation (H)         CONTINUE these medications which have NOT CHANGED    Details   albuterol (PROAIR HFA/PROVENTIL HFA/VENTOLIN HFA) 108 (90 Base) MCG/ACT inhaler Inhale 1-2 puffs into the lungs every 6 hours as needed for shortness of breath / dyspnea or wheezing  Qty: 3 Inhaler, Refills: 5    Comments: Pharmacy may dispense brand covered by insurance (Proair, or proventil or ventolin or generic albuterol inhaler)  Associated Diagnoses: Chronic obstructive pulmonary disease, unspecified COPD type (H)      apixaban ANTICOAGULANT (ELIQUIS ANTICOAGULANT) 5 MG tablet Take 1 tablet (5 mg) by mouth 2 times daily  Qty: 180 tablet, Refills: 3    Associated Diagnoses: Pulmonary embolism, bilateral (H)      atorvastatin (LIPITOR) 10 MG tablet Take 1 tablet (10 mg) by mouth daily  Qty: 90 tablet, Refills: 1    Comments: primary  Associated Diagnoses: Mixed hyperlipidemia      DULoxetine (CYMBALTA) 30 MG capsule Take 60 mg by mouth daily      famotidine (PEPCID) 40 MG tablet Take 1 tablet (40 mg) by mouth 2 times daily  Qty: 60 tablet, Refills: 10    Associated Diagnoses: Iron deficiency anemia due to chronic blood loss      ferrous sulfate  "(FE TABS) 325 (65 Fe) MG EC tablet Take 1 tablet (325 mg) by mouth daily  Qty: 90 tablet, Refills: 3    Associated Diagnoses: Iron deficiency anemia, unspecified iron deficiency anemia type; Anemia due to blood loss, acute      gabapentin (NEURONTIN) 300 MG capsule Take 600 mg by mouth At Bedtime      hydrOXYzine (VISTARIL) 50 MG capsule Take 50 mg by mouth every 6 hours as needed for itching      metoprolol succinate ER (TOPROL-XL) 25 MG 24 hr tablet Take 0.5 tablets (12.5 mg) by mouth daily  Qty: 45 tablet, Refills: 0    Associated Diagnoses: Benign essential hypertension      tamsulosin (FLOMAX) 0.4 MG capsule Take 0.4 mg by mouth daily      tiotropium (SPIRIVA HANDIHALER) 18 MCG inhaled capsule Inhale 1 capsule (18 mcg) into the lungs daily  Qty: 90 capsule, Refills: 3    Associated Diagnoses: Chronic obstructive pulmonary disease, unspecified COPD type (H)      triamcinolone (KENALOG) 0.1 % external cream Apply topically 2 times daily as needed for irritation  Qty: 453.6 g, Refills: 3    Associated Diagnoses: Chronic pruritus         STOP taking these medications       pantoprazole (PROTONIX) 40 MG EC tablet Comments:   Reason for Stopping:                 Allergies:         Allergies   Allergen Reactions     Lidocaine Blisters     Allergy to lidocaine ointment     Omeprazole Itching     Pantoprazole Itching     Prevacid [Lansoprazole] Itching     Lasix [Furosemide] Rash     Penicillins Rash     \"broke out from injection\" 60 yrs ago             Consultations This Hospital Stay:      Consultation during this admission received from cardiology and gastroenterology        Condition and Physical on Discharge:      Discharge condition: Stable   Vitals: Blood pressure 108/61, pulse 81, temperature 97.9  F (36.6  C), temperature source Oral, resp. rate 20, height 1.778 m (5' 10\"), weight 76.9 kg (169 lb 9.6 oz), SpO2 92 %.     Constitutional:  Alert awake, not in acute distress   Lungs:  Bilateral wheezing heard on " auscultation.  Good air entry, no respite distress   Cardiovascular:  Normal rate rhythm regular, no murmurs   Abdomen:  Soft, nontender, nondistended, no hepatosplenomegaly   Skin:  Warm and dry   Other:          Discharge Time:      Greater than 30 minutes.        Image Results From This Hospital Stay (For Non-EPIC Providers):        Results for orders placed or performed during the hospital encounter of 21   XR Chest Port 1 View    Narrative    CHEST ONE VIEW PORTABLE   2021 9:20 PM     HISTORY: Fever, cough.    COMPARISON: 2020      Impression    IMPRESSION: Emphysematous appearing lungs. Patchy opacity in the left  lower lobe could represent developing pneumonia. No pneumothorax or  pleural effusion.    CHRISTINA MCKEON MD   Echocardiogram Complete    Narrative    [id number]  [id number]  [id number]  [id number]^ADDIE^BRISA^JONATHAN     [facility]  Echocardiography Laboratory  [address] [place] [address]     Name: CLYDE RODRIGUEZ  MRN: [id number]  : 1932  Study Date: 2021 10:59 AM  Age: 88 yrs  Gender: Male  Patient Location: [facility]  Reason For Study: CHF  Ordering Physician: BRISA REAL  Referring Physician: ALISON DOZIER  Performed By: Cinthia Vo     BSA: 1.9 m2  Height: 70 in  Weight: 170 lb  HR: 88  BP: 96/46 mmHg  ______________________________________________________________________________  Procedure  Complete Portable Echo Adult.  ______________________________________________________________________________  Interpretation Summary     1. Left ventricular systolic function is normal. The visual ejection fraction  is estimated at 55-60%.  2. No regional wall motion abnormalities noted.  3. The right ventricle is normal in structure, function and size.  4. The left atrium is moderately dilated.  5. 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 The valve is poorly visualized.  There is increased gradients across the valve consistent with mild to  moderate  prosthetic valve stenosis; mean gradient 25 mmHg, Peak velocity 3.3 m/sec,  calculated valve area 1.4 cm2.  7. In comparison with the prior study, the gradients across the bioprosthetic  aortic valve have increased (previously 12 mmHg).  ______________________________________________________________________________  Left Ventricle  The left ventricle is normal in size. There is normal left ventricular wall  thickness. Left ventricular systolic function is normal. The visual ejection  fraction is estimated at 55-60%. Diastolic Doppler findings (E/E' ratio and/or  other parameters) suggest left ventricular filling pressures are increased. No  regional wall motion abnormalities noted.     Right Ventricle  The right ventricle is normal in structure, function and size.     Atria  The left atrium is moderately dilated. The right atrium is moderately dilated.  There is no color Doppler evidence of an atrial shunt.     Mitral Valve  The mitral valve is normal in structure and function.     Tricuspid Valve  The tricuspid valve is normal in structure and function. There is trace  tricuspid regurgitation.     Aortic Valve  The aortic valve is not well visualized. There is a bioprosthetic aortic  valve.     Pulmonic Valve  The pulmonic valve is not well seen, but is grossly normal.     Vessels  Normal size aorta. IVC diameter <2.1 cm collapsing >50% with sniff suggests a  normal RA pressure of 3 mmHg.     Pericardium  There is no pericardial effusion.     ______________________________________________________________________________  MMode/2D Measurements & Calculations  IVSd: 1.1 cm  LVIDd: 4.6 cm  LVIDs: 3.5 cm  LVPWd: 1.1 cm  FS: 24.2 %  LV mass(C)d: 179.0 grams  LV mass(C)dI: 91.9 grams/m2     Ao root diam: 3.6 cm  LA dimension: 4.2 cm  LA/Ao: 1.2  LVOT diam: 2.1 cm  LVOT area: 3.6 cm2  LA Volume (BP): 87.1 ml  LA Volume Index (BP): 44.7 ml/m2  RWT: 0.47     Doppler Measurements & Calculations  MV E max earlene: 140.0  cm/sec  MV A max santi: 157.3 cm/sec  MV E/A: 0.89  MV max P.2 mmHg  MV mean P.3 mmHg  MV V2 VTI: 36.7 cm  MVA(VTI): 2.7 cm2  MV P1/2t max santi: 170.7 cm/sec  MV P1/2t: 49.7 msec  MVA(P1/2t): 4.4 cm2  MV dec slope: 1007 cm/sec2  MV dec time: 0.14 sec     Ao V2 max: 334.5 cm/sec  Ao max P.0 mmHg  Ao V2 mean: 234.1 cm/sec  Ao mean P.0 mmHg  Ao V2 VTI: 70.9 cm  АННА(I,D): 1.4 cm2  АННА(V,D): 1.5 cm2  LV V1 max P.7 mmHg  LV V1 max: 138.9 cm/sec  LV V1 VTI: 27.7 cm  SV(LVOT): 99.1 ml  SI(LVOT): 50.9 ml/m2  PA V2 max: 90.8 cm/sec  PA max PG: 3.3 mmHg  AV Santi Ratio (DI): 0.42  АННА Index (cm2/m2): 0.72  E/E' av.1  Lateral E/e': 11.6  Medial E/e': 20.6     ______________________________________________________________________________  Report approved by: Erika Perez 2021 12:14 PM                   Most Recent Lab Results In EPIC (For Non-EPIC Providers):    Most Recent 3 CBC's:  Recent Labs   Lab Test 21  0853 04/15/21  0813 21  1611 21  0604   WBC 9.9 11.1*  --  17.3*   HGB 7.9* 7.9* 8.0* 5.6*   MCV 82 82  --  82    209  --  224      Most Recent 3 BMP's:  Recent Labs   Lab Test 04/15/21  0813 21  0506 21    142 141   POTASSIUM 4.3 4.2 4.3   CHLORIDE 112* 114* 111*   CO2    BUN    CR 0.98 1.34* 1.60*   ANIONGAP 2* 3 4   AMRITA 7.8* 7.0* 8.0*   GLC 94 113* 130*     Most Recent 3 Troponin's:  Recent Labs   Lab Test 21  0506 21  0039 21   TROPI 0.513* 0.529* 0.331*     Most Recent 3 INR's:  Recent Labs   Lab Test 10/23/20  1617 20  0948 20  0829   INR 1.39* 1.21* 1.30*     Most Recent 2 LFT's:  Recent Labs   Lab Test 21  0506 21   AST 7 10   ALT 8 10   ALKPHOS 62 82   BILITOTAL 0.4 0.5     Most Recent Cholesterol Panel:  Recent Labs   Lab Test 20  1144   CHOL 107   LDL 53   HDL 32*   TRIG 109     Most Recent 6 Bacteria Isolates From Any Culture (See EPIC Reports for  Culture Details):  Recent Labs   Lab Test 04/14/21  0815 04/13/21 2054 04/13/21  2038 09/13/20  1329 09/13/20  1256 08/15/20  1451   CULT Canceled, Test credited  >10 Squamous epithelial cells/low power field indicates oral contamination. Please   recollect.  *  Notification of test cancellation was given to  Elva Ospina RN 1139 4/14/20 by AM   No growth after 3 days No growth after 3 days No growth No growth No growth  No growth     Most Recent TSH, T4 and HgbA1c:   Recent Labs   Lab Test 07/31/18  1104 02/21/18  1124   TSH 2.88 2.38   T4  --  1.21   A1C 5.8*  --

## 2021-04-16 NOTE — PLAN OF CARE
Summary: PNA and anemia   DATE & TIME: 4/16/21   Cognitive Concerns/ Orientation: A&Ox4, [place], pleasant.  BEHAVIOR & AGGRESSION TOOL COLOR: Green  ABNL VS/O2: VSS on 3L at rest, 4-6L when amb in [place]/to BR.   MOBILITY: Up with 1 assist with GB/walker, amb in [place] x2, to BR x1, chair x2.   PAIN MANAGMENT: Denies pain  DIET: Regular.  BOWEL/BLADDER: Continent. C/O constipation, senna x1 PRN given as requested.   ABNL LAB/BG: NA  DRAIN/DEVICES: No IV access. MD aware.  TELEMETRY RHYTHM: N/A  SKIN: Intact with scattered bruising on BL forearms/R upper arm; blanchable redness on coccyx.  TESTS/PROCEDURES: N/A  D/C DAY/GOALS/PLACE: Home today with wife.   OTHER IMPORTANT INFO:  LS dim, coarse, sched nebs an inhalers, bumex x1. Encouraged IS use when awake. Hgb 7.9, GI following, Eliquis restarted yesterday. PT/OT rec home with assist, pt declining home care services.     Discharge    Patient discharged to home with wife. Declined discharge med, Breo inhaler, d/t cost. Inpt inhaler sent with pt as he has enough doses to use until his follow up appt. [facility]   Listed belongings gathered and returned to patient. Yes  Belongings returned to patient from security/pharmacy Yes  Care Plan and Patient education resolved: Yes  Prescriptions if needed, hard copies sent with patient  NA  Home and hospital acquired medications returned to patient: Yes  Medication Bin checked and emptied on discharge Yes  Follow up appointment made for patient: Yes

## 2021-04-16 NOTE — PROGRESS NOTES
[facility]  Gastroenterology Progress Note     Hermilo Velasquez MRN# [id number]   YOB: 1932 Age: 88 year old          Assessment and Plan:   Hermilo Gan is a 88 year old male with OPD, aortic stenosis status post AVR and bioprosthetic valve replacement, paroxysmal atrial fibrillation and recurrent PE on Apixaban, prior lung cancer, and prior Non-Hodgkins Lymphoma and has been admitted with left lobe pneumonia, sepsis and acute on chronic anemia. GI consulted on 4/14/21.     Active Problems:    Anemia, unspecified type    Pneumonia of left lower lobe due to infectious organism    Elevated troponin  Patient has no evidence of GI bleed. Stools brown. Has chronic anemia due to multitude of medical problems as well as takes daily Apixiban. Has h/o prior GI bleed on at least 2 occassions. Last in 10/2020 noting bleeding angioectasias in colon.  He is being treated for left lobe pneumonia with zosyn and WBC trending down. Currently requiring 3 LPM. BP  systolic.   Patient likely has had drop in hemoglobin due to sepsis vs dilutional vs GI bleed Troponin likely elevated due to blood loss ischemia.    I have discussed with patient that given current pneumonia and elevated troponin would recommend to stabilize him from cardiorespiratory standpoint.  Hemoglobin stable at 8.0/7.9 over last 4 draws . No signs of GI bleed.  Therefore no plan for EGD at this time.    -- Regular diet  -- Daily hemoglobin  -- Continue pepcid 40 mg daily- allergic to PPI  -- Plan outpatient capsule endoscopy for anemia  -- Restarted Apixiban will need to watch hemoglobin closely                Interval History:   no new complaints, doing well, denies chest pain, denies shortness of breath, denies abdominal pain, alert, oriented to person, place and time and doing well; no cp, sob, n/v/d, or abd pain.              Review of Systems:   C: NEGATIVE for fever, chills, change in weight  E/M: NEGATIVE  for ear, mouth and throat problems  R: NEGATIVE for significant cough or SOB  CV: NEGATIVE for chest pain, palpitations or peripheral edema             Medications:   I have reviewed this patient's current medications    sodium chloride 0.9%  1,000 mL Intravenous Once     apixaban ANTICOAGULANT  5 mg Oral BID     atorvastatin  10 mg Oral Daily     azithromycin  250 mg Oral Daily     cefuroxime  500 mg Oral Q12H BJ     DULoxetine  60 mg Oral Daily     famotidine  40 mg Oral BID     ferrous sulfate  140 mg Oral Daily     fluticasone-vilanterol  1 puff Inhalation Daily     gabapentin  600 mg Oral At Bedtime     ipratropium - albuterol 0.5 mg/2.5 mg/3 mL  3 mL Nebulization Q4H While awake     metoprolol succinate ER  12.5 mg Oral Daily     sodium chloride (PF)  3 mL Intracatheter Q8H     tamsulosin  0.4 mg Oral Daily     umeclidinium  1 puff Inhalation Daily                  Physical Exam:   Vitals were reviewed  Vital Signs with Ranges  Temp:  [97.7  F (36.5  C)-98.3  F (36.8  C)] 97.9  F (36.6  C)  Pulse:  [69-81] 81  Resp:  [18-20] 20  BP: (108-129)/(51-67) 108/61  SpO2:  [90 %-98 %] 92 %  I/O last 3 completed shifts:  In: 500 [P.O.:500]  Out: 1450 [Urine:1450]  Constitutional: healthy, alert and no distress   Cardiovascular: negative, PMI normal. No lifts, heaves, or thrills. RRR. No murmurs, clicks gallops or rub  Respiratory: negative, Percussion normal. Good diaphragmatic excursion. Lungs clear  Head: Normocephalic. No masses, lesions, tenderness or abnormalities  Neck: Neck supple. No adenopathy. Thyroid symmetric, normal size,, Carotids without bruits.  Abdomen: Abdomen soft, non-tender. BS normal. No masses, organomegaly           Data:   I reviewed the patient's new clinical lab test results.   Recent Labs   Lab Test 04/16/21  0853 04/15/21  0813 04/14/21  1611 04/14/21  0604 10/23/20  1617 10/23/20  1617 08/17/20  0948 08/17/20  0948 08/16/20  0829   WBC 9.9 11.1*  --  17.3*   < > 8.0   < > 8.9 9.5   HGB  7.9* 7.9* 8.0* 5.6*   < > 6.6*   < > 7.7* 7.5*   MCV 82 82  --  82   < > 89   < > 82 81    209  --  224   < > 279   < > 273 272   INR  --   --   --   --   --  1.39*  --  1.21* 1.30*    < > = values in this interval not displayed.     Recent Labs   Lab Test 04/15/21  0813 04/14/21  0506 04/13/21  2038   POTASSIUM 4.3 4.2 4.3   CHLORIDE 112* 114* 111*   CO2 26 25 26   BUN 19 24 21   ANIONGAP 2* 3 4     Recent Labs   Lab Test 04/14/21  0506 04/13/21  2038 10/25/20  1145 08/16/20  0400 08/16/20  0400 03/22/20  1950 03/22/20  1950 03/22/20  1914 12/22/19  1614   ALBUMIN 2.1* 2.6* 2.6*   < >  --    < >  --  2.6*  --    BILITOTAL 0.4 0.5 0.5   < >  --    < >  --  0.6  --    ALT 8 10 11   < >  --    < >  --  27  --    AST 7 10 17   < >  --    < >  --  101*  --    PROTEIN  --   --   --   --  Negative  --  10*  --  10*   LIPASE  --   --   --   --   --   --   --  54*  --     < > = values in this interval not displayed.       I reviewed the patient's new imaging results.    All laboratory data reviewed  All imaging studies reviewed by me.    Sammie oCx PA-C,  4/16/2021  [facility]  Office : [phone]  Cell: [phone] (Dr. Florentino)  Cell: [phone] (Sammie Cox PA-C)

## 2021-04-16 NOTE — PROGRESS NOTES
Care Management Discharge Note    Discharge Date: 04/17/21       Discharge Disposition: Home    Discharge Services:Pt is declining home care PT/OT    Discharge DME: Pt is not interested in O2 service at home wants to continue with his Inogen, he is in agreement for a nebulizer    Discharge Transportation: family or friend will provide    Private pay costs discussed: Not applicable    PAS Confirmation Code:NA    Patient/family educated on Medicare website which has current facility and service quality ratings: no    Education Provided on the Discharge Plan: yes   Persons Notified of Discharge Plans: Pt, Nsg  Patient/Family in Agreement with the Plan: yes for discharge; not in agreement to have O2 set-up or home care    Handoff Referral Completed: Yes    Additional Information:  As noted, pt does not want O2 set-up.  He has an Inogen device that he paid $2100 for as he felt this was cheaper than spending $38.00 a month co-pay for home O2 for the rest of his life.  Tried to explain the difference in delivery of O2, but pt feels the Inogen is all he needs.    As noted before, pt has had home care in the past and feels this was not of any benefit, therefore he is declining this.  PCP follow up is scheduled for 4/23/21.    Dr Stevenson was updated and will change the discharge orders.    PCP follow up has been scheduled for an in person appointment on 4/23/21    The home nebulizer has been ordered through [employer] and will be delivered to pt prior to discharge.  Pt's spouse will be providing transportation home.      Ale Gonsales, RN   Inpatient Care Management  [phone]

## 2021-04-16 NOTE — PLAN OF CARE
Cognitive Concerns/ Orientation: A&Ox4, [place]. Calm and cooperative. Calls as needed.   BEHAVIOR & AGGRESSION TOOL COLOR: Green  ABNL VS/O2: VSS on 3 L nasal cannula - baseline 2L at night at home. LAROSE. Infreq dry cough. Unable to produce sputum.   MOBILITY: Up with 1 assist with GB/walker, chair for meals. Assited with repositioning. Pt tends to slump down in the bed.   PAIN MANAGMENT: Denies pain  DIET: Regular, good appetite and oral intake.   BOWEL/BLADDER: Continent of B&B; urinal at bedside. Calls as needed.   ABNL LAB/BG: Hgb 7.9, WBC 11.1, recheck in AM.   DRAIN/DEVICES: No IV access. MD aware. Abx switched to oral.   TELEMETRY RHYTHM: N/A  SKIN: Intact with scattered bruising on BL forearms/R upper arm; blanchable redness on coccyx.  TESTS/PROCEDURES: N/A  D/C DAY/GOALS/PLACE: Pending 1-2 days if Hgb stable and cleared of sepsis.   OTHER IMPORTANT INFO:  LS diminished with slight exp wheezing, scheduled nebs. Encouraged IS use when awake. Hgb 7.9, changed to PO iron, GI following. Eliquis restarted today. PT/OT. Spouse at bedside this afternoon, Pt up in the chair freq during the day. Bed and chair alarms for safety. Rounded on freq.

## 2021-04-16 NOTE — PLAN OF CARE
Occupational Therapy Discharge Summary    Reason for therapy discharge:    Discharged to home.    Progress towards therapy goal(s). See goals on Care Plan in [facility] electronic health record for goal details.  Goals not met.  Barriers to achieving goals:   discharge from facility.    Therapy recommendation(s):    No further therapy is recommended.

## 2021-04-16 NOTE — PLAN OF CARE
Summary: PNA and anemia   DATE & TIME: 4/15/21-4/16/21 1075-2780  Cognitive Concerns/ Orientation: A&Ox4, [place]. Calm and cooperative. Calls as needed.   BEHAVIOR & AGGRESSION TOOL COLOR: Green  ABNL VS/O2: VSS on 3 L nasal cannula sats at 90% overnight- baseline 2L at night at home. LAROSE. Infreq dry cough. Unable to produce sputum.   MOBILITY: Up with 1 assist with GB/walker. Not OOB this shift. Assisted with repositioning.   PAIN MANAGMENT: Denies pain  DIET: Regular.  BOWEL/BLADDER: Continent of B&B; urinal at bedside. Calls as needed.   ABNL LAB/BG: NA  DRAIN/DEVICES: No IV access. MD aware. Abx switched to oral.   TELEMETRY RHYTHM: N/A  SKIN: Intact with scattered bruising on BL forearms/R upper arm; blanchable redness on coccyx.  TESTS/PROCEDURES: N/A  D/C DAY/GOALS/PLACE: Possible discharge today if Hgb stable and cleared of sepsis.   OTHER IMPORTANT INFO:  LS diminished with slight exp wheezing, scheduled nebs. Encouraged IS use when awake. On PO iron. GI following. Eliquis restarted yesterday, closely monitoring Hgb. PT/OT.

## 2021-04-17 NOTE — PLAN OF CARE
Physical Therapy Discharge Summary    Reason for therapy discharge:    Discharged to home.    Progress towards therapy goal(s). See goals on Care Plan in [facility] electronic health record for goal details.  Goals not met.  Barriers to achieving goals:   discharge on same date as initial evaluation.    Therapy recommendation(s):    Ambulation program.

## 2021-04-19 ENCOUNTER — DOCUMENTATION ONLY (OUTPATIENT)
Dept: LAB | Facility: CLINIC | Age: 86
End: 2021-04-19

## 2021-04-19 ENCOUNTER — TELEPHONE (OUTPATIENT)
Dept: FAMILY MEDICINE | Facility: CLINIC | Age: 86
End: 2021-04-19

## 2021-04-19 ENCOUNTER — PATIENT OUTREACH (OUTPATIENT)
Dept: CARE COORDINATION | Facility: CLINIC | Age: 86
End: 2021-04-19

## 2021-04-19 DIAGNOSIS — J44.9 CHRONIC OBSTRUCTIVE PULMONARY DISEASE, UNSPECIFIED COPD TYPE (H): Primary | ICD-10-CM

## 2021-04-19 DIAGNOSIS — D62 ANEMIA DUE TO BLOOD LOSS, ACUTE: Primary | ICD-10-CM

## 2021-04-19 NOTE — TELEPHONE ENCOUNTER
"Next 5 appointments (look out 90 days)    Apr 23, 2021 10:00 AM  Office Visit with Karson Bishop MD  [facility] ([facility] ) [address] [facility] [address] [phone]        ED / Discharge Outreach Protocol    Patient Contact    Attempt # 1    Was call answered?  Yes.  \"May I please speak with <Hermilo>\"  Is patient available?   Yes    ED/Discharge Protocol    \"Hi, my name is Yamilex Gutierrez RN, a registered nurse, and I am calling on behalf of Dr. Bishop's office at [facility].  I am calling to follow up and see how things are going for you after your recent visit.\"    \"I see that you were in the (ER/UC/IP) on 4/13/21.    How are you doing now that you are home?\" doing well     Is patient experiencing symptoms that may require a hospital visit?  No     Discharge Instructions    \"Let's review your discharge instructions.  What is/are the follow-up recommendations?  Pt. Response: follow up with PCP     \"Were you instructed to make a follow-up appointment?\"  Pt. Response: Yes.  Has appointment been made?   Yes      \"When you see the provider, I would recommend that you bring your discharge instructions with you.    Medications    \"How many new medications are you on since your hospitalization/ED visit?\"    2 or more   \"How many of your current medicines changed (dose, timing, name, etc.) while you were in the hospital/ED visit?\"   0-1  \"Do you have questions about your medications?\"   No  \"Were you newly diagnosed with heart failure, COPD, diabetes or did you have a heart attack?\"   No  For patients on insulin: \"Did you start on insulin in the hospital or did you have your insulin dose changed?\"   No  Post Discharge Medication Reconciliation Status: discharge medications reconciled, continue medications without change.    Was MTM referral placed (*Make sure to put transitions as reason for referral)?   No    Call Summary    \"Do you have any questions or concerns " "about your condition or care plan at the moment?\"    No  Triage nurse advice given    Patient was in ER 6x in the past year (assess appropriateness of ER visits.)      \"If you have questions or things don't continue to improve, we encourage you contact us through the main clinic number,  ([phone])  .  Even if the clinic is not open, triage nurses are available 24/7 to help you.     We would like you to know that our clinic has extended hours (provide information).  We also have urgent care (provide details on closest location and hours/contact info)\"      \"Thank you for your time and take care!\"    Yamilex EVERETT RN    "

## 2021-04-19 NOTE — PROGRESS NOTES
Clinic Care Coordination Contact  [facility]/Voicemail       Clinical Data: Care Coordinator Outreach  Outreach attempted x 1.  Left message on patient's voicemail with call back information and requested return call.  Plan: Care Coordinator will try to reach patient again in 1-2 business days.

## 2021-04-19 NOTE — PROGRESS NOTES
"Pt coming in for labs on 4/20/21, appt notes indicate \"CBC for Edna\". If necessary, please place order.   "

## 2021-04-19 NOTE — TELEPHONE ENCOUNTER
TO PCP:     Called pt for hospital follow up     Pt is scheduled to see you Friday    But he is requesting a nebulizer machine (was given [facility]bulizer medication at [facility]) [facility]     See pended DME order    Yamilex EVERETT RN

## 2021-04-19 NOTE — TELEPHONE ENCOUNTER
Copd Exacerbation (H), Pneumonia Of Left Lower Lobe Due To Infectious Organism,[facility]RI 16-APR-2021,ed/ip  0 / 2    [phone] (home)

## 2021-04-19 NOTE — LETTER
[facility] CARE COORDINATION  [address]  [facility] [address]    April 20, 2021    Hermilo Velasquez  [address] [facility] [address]      Dear Hermilo,    I am a clinic community health worker who works with Karson Bishop MD at [facility]. I have been trying to reach you recently to introduce Clinic Care Coordination and to see if there was anything I could assist you with.  I recently tried to call and was unable to reach you. Below is a description of clinic care coordination and how I can further assist you.      The clinic care coordination team is made up of a registered nurse,  and community health worker who understand the health care system. The goal of clinic care coordination is to help you manage your health and improve access to the health care system in the most efficient manner. The team can assist you in meeting your health care goals by providing education, coordinating services, strengthening the communication among your providers and supporting you with any resource needs.    Please feel free to contact the Community Health Worker at [phone] with any questions or concerns. We are focused on providing you with the highest-quality healthcare experience possible and that all starts with you.     Sincerely,     Daiana Davies   Community Health Worker   Ph: [phone]  Fx: [phone]

## 2021-04-20 DIAGNOSIS — D62 ANEMIA DUE TO BLOOD LOSS, ACUTE: ICD-10-CM

## 2021-04-20 LAB
ERYTHROCYTE [DISTWIDTH] IN BLOOD BY AUTOMATED COUNT: 18.1 % (ref 10–15)
HCT VFR BLD AUTO: 29.7 % (ref 40–53)
HGB BLD-MCNC: 8.9 G/DL (ref 13.3–17.7)
MCH RBC QN AUTO: 25.1 PG (ref 26.5–33)
MCHC RBC AUTO-ENTMCNC: 30 G/DL (ref 31.5–36.5)
MCV RBC AUTO: 84 FL (ref 78–100)
PLATELET # BLD AUTO: 279 10E9/L (ref 150–450)
RBC # BLD AUTO: 3.54 10E12/L (ref 4.4–5.9)
WBC # BLD AUTO: 8.9 10E9/L (ref 4–11)

## 2021-04-20 PROCEDURE — 85027 COMPLETE CBC AUTOMATED: CPT | Performed by: INTERNAL MEDICINE

## 2021-04-20 PROCEDURE — 36415 COLL VENOUS BLD VENIPUNCTURE: CPT | Performed by: INTERNAL MEDICINE

## 2021-04-20 NOTE — LETTER
April 20, 2021      Hermilo Velasquez  [address] [facility] [address]        Dear ,    The following letter pertains to your most recent diagnostic tests:     Good news! The hemoglobin is stable.     Resulted Orders   **CBC with platelets FUTURE anytime   Result Value Ref Range    WBC 8.9 4.0 - 11.0 10e9/L    RBC Count 3.54 (L) 4.4 - 5.9 10e12/L    Hemoglobin 8.9 (L) 13.3 - 17.7 g/dL    Hematocrit 29.7 (L) 40.0 - 53.0 %    MCV 84 78 - 100 fl    MCH 25.1 (L) 26.5 - 33.0 pg    MCHC 30.0 (L) 31.5 - 36.5 g/dL    RDW 18.1 (H) 10.0 - 15.0 %    Platelet Count 279 150 - 450 10e9/L       If you have any questions or concerns, please call the clinic at the number listed above.       Sincerely,      Karson Bishop MD        elizabeth

## 2021-04-20 NOTE — PROGRESS NOTES
Clinic Care Coordination Contact  [facility]/Voicemail       Clinical Data: Care Coordinator Outreach  Outreach attempted x 2.  Left message on patient's voicemail with call back information and requested return call.  Plan: Care Coordinator will send care coordination introduction letter with care coordinator contact information and explanation of care coordination services via mail. Care Coordinator will do no further outreaches at this time.

## 2021-04-20 NOTE — TELEPHONE ENCOUNTER
[facility] to [facility], notified [facility].  Dione Mcgregor, RN  [facility] RN Triage Team

## 2021-04-20 NOTE — RESULT ENCOUNTER NOTE
The following letter pertains to your most recent diagnostic tests:    Good news! The hemoglobin is stable.      Sincerely,    Dr. Bishop

## 2021-04-23 ENCOUNTER — OFFICE VISIT (OUTPATIENT)
Dept: FAMILY MEDICINE | Facility: CLINIC | Age: 86
End: 2021-04-23
Payer: COMMERCIAL

## 2021-04-23 VITALS
TEMPERATURE: 97.7 F | WEIGHT: 168 LBS | OXYGEN SATURATION: 93 % | HEART RATE: 70 BPM | HEIGHT: 70 IN | SYSTOLIC BLOOD PRESSURE: 123 MMHG | DIASTOLIC BLOOD PRESSURE: 54 MMHG | BODY MASS INDEX: 24.05 KG/M2

## 2021-04-23 DIAGNOSIS — D50.0 IRON DEFICIENCY ANEMIA DUE TO CHRONIC BLOOD LOSS: Primary | ICD-10-CM

## 2021-04-23 PROCEDURE — 99495 TRANSJ CARE MGMT MOD F2F 14D: CPT | Performed by: INTERNAL MEDICINE

## 2021-04-23 ASSESSMENT — MIFFLIN-ST. JEOR: SCORE: 1438.29

## 2021-04-23 NOTE — PROGRESS NOTES
"  SUBJECTIVE:                                                      Patient presents for Hospital Followup Visit:    Hospital:  [facility]      Date of Admission: 4/13/21  Date of Discharge: 4/16/21  Transitional Care Management Phone Call: yes  Reason(s) for Admission: COPD exacerbation, pneumonia and anemia             Problems taking medications regularly:  None       Medication changes since discharge: None       Problems adhering to non-medication therapy:  None    Summary of hospitalization:  [facility] discharge summary reviewed  Diagnostic Tests/Treatments reviewed.  Follow up needed: none  Other Healthcare Providers Involved in Patient s Care:         None  Update since discharge: improved.     Hospitalized for cough and wheezing treated for COPD exacerbation, pneumonia, severe anemia received 2 units of blood      He feels better now  He completed his antibiotics  His current stools are observed to be brown    OBJECTIVE:                                                      /54 (BP Location: Right arm, Patient Position: Chair, Cuff Size: Adult Regular)   Pulse 70   Temp 97.7  F (36.5  C) (Temporal)   Ht 1.778 m (5' 10\")   Wt 76.2 kg (168 lb)   SpO2 93%   BMI 24.11 kg/m      General: This is a well-appearing man in no acute distress who speaks in full sentences and does not appear toxic  Pulmonary: The lungs are clear to auscultation bilaterally, breathing is not labored, no wheezing  Cardiovascular: The heart has an irregularly irregular rhythm with a normal rate  Neurological: Alert and oriented to person place and time, hard of hearing    ASSESSMENT/PLAN:                                                      1. Iron deficiency anemia due to chronic blood loss  It seems that he has recovered well from his COPD exacerbation, pneumonia.  His ongoing GI blood loss is problematic.  I think that we do need closer follow-up of his hemoglobin to avoid significant drops.  He is on " an oral iron supplement.  I asked him to return in about 1 month's time with a CBC before the visit to monitor the hemoglobin closely.  I discussed the close, short-term follow-up of the blood counts will be necessary to prevent significant anemia that might require hospitalization etc.  He seemed to understand.  [facility] as well.  - **CBC with platelets FUTURE anytime; Future       Post Discharge Medication Reconciliation: discharge medications reconciled, continue medications without change.  Issues to address: No issues identified.  Plan of care communicated with patient      Type of Medical Decision Making Face-to-Face Visit within 7 Days Face-to-Face Visit within 14 days   Moderate Complexity [id number]   High Complexity [id number]

## 2021-05-17 ENCOUNTER — TELEPHONE (OUTPATIENT)
Dept: FAMILY MEDICINE | Facility: CLINIC | Age: 86
End: 2021-05-17

## 2021-05-17 DIAGNOSIS — J44.1 COPD EXACERBATION (H): ICD-10-CM

## 2021-05-17 RX ORDER — IPRATROPIUM BROMIDE AND ALBUTEROL SULFATE 2.5; .5 MG/3ML; MG/3ML
3 SOLUTION RESPIRATORY (INHALATION) 4 TIMES DAILY
Qty: 360 ML | Refills: 0 | Status: SHIPPED | OUTPATIENT
Start: 2021-05-17 | End: 2021-05-17

## 2021-05-17 RX ORDER — IPRATROPIUM BROMIDE AND ALBUTEROL SULFATE 2.5; .5 MG/3ML; MG/3ML
3 SOLUTION RESPIRATORY (INHALATION) 4 TIMES DAILY
Qty: 360 ML | Refills: 0 | Status: SHIPPED | OUTPATIENT
Start: 2021-05-17 | End: 2021-05-27

## 2021-05-17 NOTE — TELEPHONE ENCOUNTER
Disregard, error.  Dione Mcgregor, RN  [facility] RN Triage Team

## 2021-05-27 ENCOUNTER — OFFICE VISIT (OUTPATIENT)
Dept: FAMILY MEDICINE | Facility: CLINIC | Age: 86
End: 2021-05-27
Payer: COMMERCIAL

## 2021-05-27 VITALS
DIASTOLIC BLOOD PRESSURE: 62 MMHG | HEIGHT: 70 IN | OXYGEN SATURATION: 96 % | TEMPERATURE: 96.9 F | SYSTOLIC BLOOD PRESSURE: 121 MMHG | WEIGHT: 163 LBS | HEART RATE: 65 BPM | BODY MASS INDEX: 23.34 KG/M2

## 2021-05-27 DIAGNOSIS — D50.0 IRON DEFICIENCY ANEMIA DUE TO CHRONIC BLOOD LOSS: ICD-10-CM

## 2021-05-27 DIAGNOSIS — I50.32 CHRONIC DIASTOLIC CONGESTIVE HEART FAILURE (H): ICD-10-CM

## 2021-05-27 DIAGNOSIS — N18.30 STAGE 3 CHRONIC KIDNEY DISEASE, UNSPECIFIED WHETHER STAGE 3A OR 3B CKD (H): ICD-10-CM

## 2021-05-27 DIAGNOSIS — D50.0 IRON DEFICIENCY ANEMIA DUE TO CHRONIC BLOOD LOSS: Primary | ICD-10-CM

## 2021-05-27 DIAGNOSIS — Z86.711 HISTORY OF PULMONARY EMBOLISM: ICD-10-CM

## 2021-05-27 DIAGNOSIS — J44.9 CHRONIC OBSTRUCTIVE PULMONARY DISEASE, UNSPECIFIED COPD TYPE (H): ICD-10-CM

## 2021-05-27 DIAGNOSIS — M17.11 PRIMARY LOCALIZED OSTEOARTHROSIS OF RIGHT LOWER LEG: ICD-10-CM

## 2021-05-27 DIAGNOSIS — C34.91 PRIMARY MALIGNANT NEOPLASM OF RIGHT LUNG (H): ICD-10-CM

## 2021-05-27 PROBLEM — I50.33 ACUTE ON CHRONIC DIASTOLIC CONGESTIVE HEART FAILURE (H): Status: ACTIVE | Noted: 2021-05-27

## 2021-05-27 PROBLEM — J96.91 RESPIRATORY FAILURE WITH HYPOXIA AND HYPERCAPNIA (H): Status: RESOLVED | Noted: 2020-09-13 | Resolved: 2021-05-27

## 2021-05-27 PROBLEM — J96.92 RESPIRATORY FAILURE WITH HYPOXIA AND HYPERCAPNIA (H): Status: RESOLVED | Noted: 2020-09-13 | Resolved: 2021-05-27

## 2021-05-27 LAB
ERYTHROCYTE [DISTWIDTH] IN BLOOD BY AUTOMATED COUNT: 17.6 % (ref 10–15)
HCT VFR BLD AUTO: 36.3 % (ref 40–53)
HGB BLD-MCNC: 11.6 G/DL (ref 13.3–17.7)
MCH RBC QN AUTO: 26.3 PG (ref 26.5–33)
MCHC RBC AUTO-ENTMCNC: 32 G/DL (ref 31.5–36.5)
MCV RBC AUTO: 82 FL (ref 78–100)
PLATELET # BLD AUTO: 243 10E9/L (ref 150–450)
RBC # BLD AUTO: 4.41 10E12/L (ref 4.4–5.9)
WBC # BLD AUTO: 7.7 10E9/L (ref 4–11)

## 2021-05-27 PROCEDURE — 99214 OFFICE O/P EST MOD 30 MIN: CPT | Mod: 25 | Performed by: INTERNAL MEDICINE

## 2021-05-27 PROCEDURE — 20610 DRAIN/INJ JOINT/BURSA W/O US: CPT | Performed by: INTERNAL MEDICINE

## 2021-05-27 PROCEDURE — 36415 COLL VENOUS BLD VENIPUNCTURE: CPT | Performed by: INTERNAL MEDICINE

## 2021-05-27 PROCEDURE — 85027 COMPLETE CBC AUTOMATED: CPT | Performed by: INTERNAL MEDICINE

## 2021-05-27 RX ORDER — ALBUTEROL SULFATE 0.83 MG/ML
2.5 SOLUTION RESPIRATORY (INHALATION) EVERY 6 HOURS PRN
Qty: 180 ML | Refills: 11 | Status: SHIPPED | OUTPATIENT
Start: 2021-05-27 | End: 2022-06-01

## 2021-05-27 RX ORDER — TRIAMCINOLONE ACETONIDE 40 MG/ML
40 INJECTION, SUSPENSION INTRA-ARTICULAR; INTRAMUSCULAR ONCE
Status: DISCONTINUED | OUTPATIENT
Start: 2021-05-27 | End: 2022-04-10

## 2021-05-27 RX ORDER — LIDOCAINE HYDROCHLORIDE 10 MG/ML
3 INJECTION, SOLUTION INFILTRATION; PERINEURAL ONCE
Status: DISCONTINUED | OUTPATIENT
Start: 2021-05-27 | End: 2022-04-10

## 2021-05-27 ASSESSMENT — MIFFLIN-ST. JEOR: SCORE: 1415.61

## 2021-05-27 NOTE — PROGRESS NOTES
Assessment & Plan     Iron deficiency anemia due to chronic blood loss  Stable   - **CBC with platelets FUTURE anytime; Future    Chronic obstructive pulmonary disease, unspecified COPD type (H)  Albuterol only neb is probably ok since he is on spiriva   - albuterol (PROVENTIL) (2.5 MG/3ML) 0.083% neb solution; Take 1 vial (2.5 mg) by nebulization every 6 hours as needed for shortness of breath / dyspnea or wheezing    Primary malignant neoplasm of right lung (H)  Post surgical excision     Chronic diastolic congestive heart failure (H)  Stable     Stage 3 chronic kidney disease, unspecified whether stage 3a or 3b CKD      History of pulmonary embolism      Primary localized osteoarthrosis of right lower leg    Right leg cortisone injection   After discussion of risks and benefits of the procedure including bleeding and infection, verbal informed consent was obtained.  Area prepped and draped in sterile fashion.  Local anesthesia was obtained with 1% lidocaine.   The suprapatellar pouch was entered using a lateral approach.  1cc of K40 was injected into the joint space.  The procedure was tolerated well and after care was discussed.      - Large Joint/Bursa injection and/or drainage (Shoulder, Knee)  - triamcinolone (KENALOG-40) injection 40 mg  - lidocaine 1 % injection 3 mL      31 minutes spent on the date of the encounter doing chart review, history and exam, documentation and further activities per the note           Return in about 2 months (around 7/27/2021) for recheck anemia, pre-visit lab appt to check hgb before visit .  Patient instructed to return to clinic or contact us sooner if symptoms worsen or new symptoms develop.     Karson Bishop MD  [facility] RENETTA Akhtar is a 88 year old who presents for the following health issues     HPI     Follow up Anemia, COPD, knee OA,history of pulmonary embolism, chronic CHF, CKD    No blood in stools  No melena  Breathing feels  "OK  Has questions about nebulizer  [facility] al[facility] only [facility] rather than duonebs  He takes spriva too  His knee is bothering him again and he requests another knee injection which has helped in the past     Review of Systems         Objective    /62 (BP Location: Right arm, Cuff Size: Adult Regular)   Pulse 65   Temp 96.9  F (36.1  C) (Temporal)   Ht 1.778 m (5' 10\")   Wt 73.9 kg (163 lb)   SpO2 96%   BMI 23.39 kg/m    Body mass index is 23.39 kg/m .  Physical Exam   GENERAL: healthy, alert and no distress  RESP: lungs clear to auscultation - no rales, rhonchi or wheezes  CV: Heart with regular rate and rhythm.   NEURO: Very hard of hearing   PSYCH: mentation appears normal, affect normal/bright                "

## 2021-06-22 ENCOUNTER — TRANSFERRED RECORDS (OUTPATIENT)
Dept: HEALTH INFORMATION MANAGEMENT | Facility: CLINIC | Age: 86
End: 2021-06-22

## 2021-06-23 ENCOUNTER — TRANSFERRED RECORDS (OUTPATIENT)
Dept: HEALTH INFORMATION MANAGEMENT | Facility: CLINIC | Age: 86
End: 2021-06-23

## 2021-07-13 ENCOUNTER — TELEPHONE (OUTPATIENT)
Dept: FAMILY MEDICINE | Facility: CLINIC | Age: 86
End: 2021-07-13

## 2021-07-13 NOTE — TELEPHONE ENCOUNTER
Sunday was standing up about 7 got dizzy and blacked out and fell has a gash on left arm and welt on butt,   Wife came in when heard the bang, found patient on floor, patient got up by self. Nurse asked why patient did not call 911 - patient states cannot afford to go to ED just wants to schedule appointment with primary.   Patient requesting an appointment.  No more dizzy, has a bruise on butt and can feel a lump in buttock , is able to walk, when sits and then gets up gets stiff.   Patient alert and oriented on the phone,   Is able to walk/bear weight, bruise just hurts when sits down. No vision changes.   Admits does not drink much water had 2 glasses yesterday - nurse advised patient drink at least 6 to 8 eight ounce glasses of water per day, change positions slowly. Call 911 if the dizziness occurs again. Patient verbalized understanding and agreement with plan and had no questions.         Scheduled appointment for tomorrow with patient's PCP.           Marixa Lucas RN  [facility]

## 2021-07-14 ENCOUNTER — OFFICE VISIT (OUTPATIENT)
Dept: FAMILY MEDICINE | Facility: CLINIC | Age: 86
End: 2021-07-14
Payer: COMMERCIAL

## 2021-07-14 ENCOUNTER — ANCILLARY PROCEDURE (OUTPATIENT)
Dept: GENERAL RADIOLOGY | Facility: CLINIC | Age: 86
End: 2021-07-14
Attending: INTERNAL MEDICINE
Payer: COMMERCIAL

## 2021-07-14 VITALS
HEIGHT: 70 IN | HEART RATE: 81 BPM | WEIGHT: 158 LBS | SYSTOLIC BLOOD PRESSURE: 124 MMHG | OXYGEN SATURATION: 99 % | DIASTOLIC BLOOD PRESSURE: 73 MMHG | TEMPERATURE: 97.5 F | BODY MASS INDEX: 22.62 KG/M2

## 2021-07-14 DIAGNOSIS — M25.552 HIP PAIN, LEFT: Primary | ICD-10-CM

## 2021-07-14 DIAGNOSIS — I95.1 ORTHOSTATIC HYPOTENSION: ICD-10-CM

## 2021-07-14 DIAGNOSIS — S51.012A SKIN TEAR OF LEFT ELBOW WITHOUT COMPLICATION, INITIAL ENCOUNTER: ICD-10-CM

## 2021-07-14 DIAGNOSIS — M25.552 HIP PAIN, LEFT: ICD-10-CM

## 2021-07-14 DIAGNOSIS — T14.8XXA HEMATOMA OF SKIN: ICD-10-CM

## 2021-07-14 PROCEDURE — 72170 X-RAY EXAM OF PELVIS: CPT | Performed by: RADIOLOGY

## 2021-07-14 PROCEDURE — 99214 OFFICE O/P EST MOD 30 MIN: CPT | Performed by: INTERNAL MEDICINE

## 2021-07-14 RX ORDER — TRAMADOL HYDROCHLORIDE 50 MG/1
50 TABLET ORAL EVERY 6 HOURS PRN
Qty: 10 TABLET | Refills: 0 | Status: SHIPPED | OUTPATIENT
Start: 2021-07-14 | End: 2021-07-17

## 2021-07-14 ASSESSMENT — MIFFLIN-ST. JEOR: SCORE: 1392.93

## 2021-07-14 NOTE — PROGRESS NOTES
Assessment & Plan     Hip pain, left    - XR Pelvis 1/2 Views; Future    Hematoma of skin    - CBC with platelets; Future  - traMADol (ULTRAM) 50 MG tablet; Take 1 tablet (50 mg) by mouth every 6 hours as needed for severe pain    Skin tear of left elbow without complication, initial encounter      Orthostatic hypotension      I suspect that his fall was the result of orthostatic hypotension and near syncope.  Stop Flomax to address this.  Check hemoglobin to make sure that he has not had new gastrointestinal blood loss.  He does not report any new blood in his stools or change in stool color.  Check x-ray of the pelvis to make sure there is not a pelvic or hip fracture.  Can use tramadol to manage pain, side effects and risks discussed.  Left elbow skin tear was irrigated with sterile water and dressed with Vaseline and a Band-Aid.  Patient was educated on how to do daily dressing changes as such.  He does have an appointment to see me on July 27 to follow-up.  He may need an appointment sooner pending the results of his hemoglobin and x-ray.        24 minutes spent on the date of the encounter doing chart review, history and exam, documentation and further activities per the note           Return in about 13 days (around 7/27/2021).    Karson Bishop MD  [facility] RENETTA Akhtar is a 88 year old who presents for the following health issues     HPI     88-year-old man with a history of chronic GI blood loss and anemia presents 4 days following a episode of dizziness resulting in a fall resulting in injury to the left elbow and left buttock.  He states that he felt lightheaded when he got up out of a chair and then fell to the ground but does not think that he lost consciousness.  He does not think he hit his head.  He is on Eliquis for history of pulmonary embolism and atrial fibrillation.  Recently, tamsulosin was restarted for him at the [facility].  He presents today to be  "checked out after his fall.  He complains of pain in his left buttock.  Tylenol does not provide adequate symptom relief.    Review of Systems         Objective    /73 (BP Location: Right arm, Cuff Size: Adult Regular)   Pulse 81   Temp 97.5  F (36.4  C) (Oral)   Ht 1.778 m (5' 10\")   Wt 71.7 kg (158 lb)   SpO2 99%   BMI 22.67 kg/m    Body mass index is 22.67 kg/m .  Physical Exam   General: This is a reasonably comfortable appearing man in no acute distress unchanged from previous exams.  HEENT: The scalp is normocephalic and atraumatic.  Skin: There is a shallow skin tear of the left elbow without evidence of infection.  There is a large hematoma on the left buttock and ecchymosis extending down the left leg.  Musculoskeletal: The patient is able to bear weight on his left leg without exacerbation of pain, there is pain-free range of motion of the left hip joint.  He walks with a cane.  Neurological: Very hard of hearing unchanged from previous exams, alert and oriented to person place and time, cranial nerves II to XII appear grossly intact, moves all extremities.    Of note, standing blood pressure is 100/58    Hemoglobin pending            "

## 2021-07-14 NOTE — RESULT ENCOUNTER NOTE
The following letter pertains to your most recent diagnostic tests:      Good news!  The x-ray does not show any fractures in the pelvis or hip from the fall.        Sincerely,    Dr. Bishop

## 2021-07-14 NOTE — LETTER
July 14, 2021      Hermilo Velasquez  [address] [facility] [address]        Dear ,    The following letter pertains to your most recent diagnostic tests:       Good news!  The x-ray does not show any fractures in the pelvis or hip from the fall.         Sincerely,     Dr. Bishop      Resulted Orders   XR Pelvis 1/2 Views    Narrative    XR PELVIS 1 VW 7/14/2021 2:51 PM     HISTORY: pelvis pain after fall; Hip pain, left      Impression    IMPRESSION: Left hip advanced osteoarthritis with prominent femoral  head osteophytic spurring and probable posterior medial joint space  narrowing, the appearance unchanged from 6/6/2017. No apparent  fracture.    CARMEL HEATH MD         SYSTEM ID:  [id number]

## 2021-07-27 ENCOUNTER — LAB (OUTPATIENT)
Dept: LAB | Facility: CLINIC | Age: 86
End: 2021-07-27
Attending: INTERNAL MEDICINE
Payer: COMMERCIAL

## 2021-07-27 ENCOUNTER — OFFICE VISIT (OUTPATIENT)
Dept: FAMILY MEDICINE | Facility: CLINIC | Age: 86
End: 2021-07-27
Payer: COMMERCIAL

## 2021-07-27 VITALS
HEIGHT: 70 IN | WEIGHT: 157 LBS | DIASTOLIC BLOOD PRESSURE: 68 MMHG | OXYGEN SATURATION: 99 % | BODY MASS INDEX: 22.48 KG/M2 | SYSTOLIC BLOOD PRESSURE: 135 MMHG | TEMPERATURE: 99.1 F | HEART RATE: 60 BPM

## 2021-07-27 DIAGNOSIS — D50.0 IRON DEFICIENCY ANEMIA DUE TO CHRONIC BLOOD LOSS: ICD-10-CM

## 2021-07-27 DIAGNOSIS — I95.1 ORTHOSTATIC HYPOTENSION: Primary | ICD-10-CM

## 2021-07-27 DIAGNOSIS — T14.8XXA HEMATOMA OF SKIN: ICD-10-CM

## 2021-07-27 DIAGNOSIS — R63.4 WEIGHT LOSS: ICD-10-CM

## 2021-07-27 LAB
ERYTHROCYTE [DISTWIDTH] IN BLOOD BY AUTOMATED COUNT: 17.8 % (ref 10–15)
HCT VFR BLD AUTO: 35.6 % (ref 40–53)
HGB BLD-MCNC: 11.6 G/DL (ref 13.3–17.7)
MCH RBC QN AUTO: 27.6 PG (ref 26.5–33)
MCHC RBC AUTO-ENTMCNC: 32.6 G/DL (ref 31.5–36.5)
MCV RBC AUTO: 85 FL (ref 78–100)
PLATELET # BLD AUTO: 219 10E3/UL (ref 150–450)
RBC # BLD AUTO: 4.2 10E6/UL (ref 4.4–5.9)
WBC # BLD AUTO: 7.4 10E3/UL (ref 4–11)

## 2021-07-27 PROCEDURE — 85027 COMPLETE CBC AUTOMATED: CPT

## 2021-07-27 PROCEDURE — 99213 OFFICE O/P EST LOW 20 MIN: CPT | Performed by: INTERNAL MEDICINE

## 2021-07-27 PROCEDURE — 36415 COLL VENOUS BLD VENIPUNCTURE: CPT

## 2021-07-27 ASSESSMENT — MIFFLIN-ST. JEOR: SCORE: 1388.4

## 2021-07-27 NOTE — PROGRESS NOTES
"    Assessment & Plan     Orthostatic hypotension  Improved of flomax     Iron deficiency anemia due to chronic blood loss  Stable hemoglobin   Continue short term follow up for close monitoring     Weight loss  Check labs with next visit  Hopefully this stabilizes with better fitting dentures     25 minutes spent on the date of the encounter doing chart review, history and exam, documentation and further activities per the note           Return in about 2 months (around 9/27/2021) for recheck, Pre-visit Non-fasting Lab.  Patient instructed to return to clinic or contact us sooner if symptoms worsen or new symptoms develop.     Karson Bishop MD  [facility] RENETTA Akhtar is a 88 year old who presents for the following health issues     HPI     2 month follow up Anemia. Follow up Fall.     No further dizziness or falls since stopping Flomax (tamsulosin)  Elbow is healing well  Bruising on hip is improving   He has been losing a little weight, but he is getting new dentures   No intolerable LUTS since stopping Flomax     Review of Systems         Objective    /68 (BP Location: Left arm, Cuff Size: Adult Regular)   Pulse 60   Temp 99.1  F (37.3  C) (Temporal)   Ht 1.778 m (5' 10\")   Wt 71.2 kg (157 lb)   SpO2 99%   BMI 22.53 kg/m    Body mass index is 22.53 kg/m .  Physical Exam   GEN:  Well appearing, no distress  SKIN:  Left elbow skin tear is healing, improving bruising in left hip     Standing blood pressure is 120/64                "

## 2021-08-16 ENCOUNTER — OFFICE VISIT (OUTPATIENT)
Dept: FAMILY MEDICINE | Facility: CLINIC | Age: 86
End: 2021-08-16
Payer: COMMERCIAL

## 2021-08-16 VITALS
HEART RATE: 88 BPM | OXYGEN SATURATION: 95 % | BODY MASS INDEX: 22.67 KG/M2 | SYSTOLIC BLOOD PRESSURE: 119 MMHG | DIASTOLIC BLOOD PRESSURE: 72 MMHG | WEIGHT: 158 LBS | TEMPERATURE: 99.1 F

## 2021-08-16 DIAGNOSIS — L03.90 CELLULITIS, UNSPECIFIED CELLULITIS SITE: Primary | ICD-10-CM

## 2021-08-16 PROCEDURE — 99213 OFFICE O/P EST LOW 20 MIN: CPT | Performed by: NURSE PRACTITIONER

## 2021-08-16 RX ORDER — CLOTRIMAZOLE 1 %
CREAM (GRAM) TOPICAL 2 TIMES DAILY
Qty: 15 G | Refills: 1 | Status: ON HOLD | OUTPATIENT
Start: 2021-08-16 | End: 2022-04-11

## 2021-08-16 RX ORDER — BENZOCAINE/MENTHOL 6 MG-10 MG
LOZENGE MUCOUS MEMBRANE 2 TIMES DAILY
Qty: 15 G | Refills: 1 | Status: ON HOLD | OUTPATIENT
Start: 2021-08-16 | End: 2022-04-11

## 2021-08-16 NOTE — PATIENT INSTRUCTIONS
Bacitracin once at night for 5 days    Lotrimin cream twice a day for 1-2 weeks     Hydrocortisone cream twice a day for 1-2 weeks    Was the area really well with soap and water once a day at least.

## 2021-08-16 NOTE — PROGRESS NOTES
Assessment & Plan   Problem List Items Addressed This Visit     None      Visit Diagnoses     Cellulitis, unspecified cellulitis site    -  Primary    Relevant Medications    clotrimazole (LOTRIMIN) 1 % external cream    hydrocortisone (CORTAID) 1 % external cream         Rash of umbilicus. Will treat with triple topical tx of abx ointment, lotrimin and hydrocortisone for 1-2 weeks. Keep area clean. Follow-up ILYA Gauthier CNP  [facility] RENETTA Akhtar is a 88 year old who presents for the following health issues     HPI     Chief Complaint   Patient presents with     belly button     pt c/o painful belly button x1 week; red, and very sore       1 week of red, sore belly button   Tried bacitracin that didn't help   No known cause       Review of Systems   Detailed as above         Objective    /72 (BP Location: Left arm, Cuff Size: Adult Regular)   Pulse 88   Temp 99.1  F (37.3  C) (Tympanic)   Wt 71.7 kg (158 lb)   SpO2 95%   BMI 22.67 kg/m    Body mass index is 22.67 kg/m .  Physical Exam  Constitutional:       Appearance: Normal appearance.   Pulmonary:      Effort: Pulmonary effort is normal.   Skin:     Comments: Erythema and excoriation of umbilicus   Neurological:      Mental Status: He is alert.   Psychiatric:         Mood and Affect: Mood normal.

## 2021-09-08 ENCOUNTER — LAB (OUTPATIENT)
Dept: LAB | Facility: CLINIC | Age: 86
End: 2021-09-08
Payer: COMMERCIAL

## 2021-09-08 DIAGNOSIS — R63.4 WEIGHT LOSS: ICD-10-CM

## 2021-09-08 LAB
ERYTHROCYTE [DISTWIDTH] IN BLOOD BY AUTOMATED COUNT: 15 % (ref 10–15)
HCT VFR BLD AUTO: 38.4 % (ref 40–53)
HGB BLD-MCNC: 12.6 G/DL (ref 13.3–17.7)
MCH RBC QN AUTO: 28.6 PG (ref 26.5–33)
MCHC RBC AUTO-ENTMCNC: 32.8 G/DL (ref 31.5–36.5)
MCV RBC AUTO: 87 FL (ref 78–100)
PLATELET # BLD AUTO: 189 10E3/UL (ref 150–450)
RBC # BLD AUTO: 4.4 10E6/UL (ref 4.4–5.9)
WBC # BLD AUTO: 8.5 10E3/UL (ref 4–11)

## 2021-09-08 PROCEDURE — 36415 COLL VENOUS BLD VENIPUNCTURE: CPT

## 2021-09-08 PROCEDURE — 84443 ASSAY THYROID STIM HORMONE: CPT

## 2021-09-08 PROCEDURE — 85027 COMPLETE CBC AUTOMATED: CPT

## 2021-09-08 PROCEDURE — 80053 COMPREHEN METABOLIC PANEL: CPT

## 2021-09-09 ENCOUNTER — TRANSFERRED RECORDS (OUTPATIENT)
Dept: HEALTH INFORMATION MANAGEMENT | Facility: CLINIC | Age: 86
End: 2021-09-09

## 2021-09-09 LAB
ALBUMIN SERPL-MCNC: 3.1 G/DL (ref 3.4–5)
ALP SERPL-CCNC: 102 U/L (ref 40–150)
ALT SERPL W P-5'-P-CCNC: 12 U/L (ref 0–70)
ANION GAP SERPL CALCULATED.3IONS-SCNC: <1 MMOL/L (ref 3–14)
AST SERPL W P-5'-P-CCNC: 10 U/L (ref 0–45)
BILIRUB SERPL-MCNC: 0.6 MG/DL (ref 0.2–1.3)
BUN SERPL-MCNC: 25 MG/DL (ref 7–30)
CALCIUM SERPL-MCNC: 8 MG/DL (ref 8.5–10.1)
CHLORIDE BLD-SCNC: 107 MMOL/L (ref 94–109)
CO2 SERPL-SCNC: 29 MMOL/L (ref 20–32)
CREAT SERPL-MCNC: 1.24 MG/DL (ref 0.66–1.25)
GFR SERPL CREATININE-BSD FRML MDRD: 52 ML/MIN/1.73M2
GLUCOSE BLD-MCNC: 97 MG/DL (ref 70–99)
POTASSIUM BLD-SCNC: 4 MMOL/L (ref 3.4–5.3)
PROT SERPL-MCNC: 6.3 G/DL (ref 6.8–8.8)
SODIUM SERPL-SCNC: 136 MMOL/L (ref 133–144)
TSH SERPL DL<=0.005 MIU/L-ACNC: 2.46 MU/L (ref 0.4–4)

## 2021-09-09 NOTE — RESULT ENCOUNTER NOTE
The following letter pertains to your most recent diagnostic tests:    These blood test results look stable including the hemoglobin.  We can discuss the results further when I see you later this month.      Sincerely,    Dr. Bishop

## 2021-09-23 DIAGNOSIS — E78.2 MIXED HYPERLIPIDEMIA: ICD-10-CM

## 2021-09-24 RX ORDER — ATORVASTATIN CALCIUM 10 MG/1
10 TABLET, FILM COATED ORAL DAILY
Qty: 90 TABLET | Refills: 2 | Status: SHIPPED | OUTPATIENT
Start: 2021-09-24 | End: 2022-06-01

## 2021-09-24 NOTE — TELEPHONE ENCOUNTER
Routing refill request to provider for review/approval because:  Labs not current:    LDL Cholesterol Calculated   Date Value Ref Range Status   06/18/2020 53 <100 mg/dL Final     Comment:     Desirable:       <100 mg/dl       Last office vist: 8/16/2021    Pended 90 day supply & 2 refills. Please Review.    Next office visit:9/28/2021      Walker Ennis RN  [facility]

## 2021-09-28 ENCOUNTER — OFFICE VISIT (OUTPATIENT)
Dept: FAMILY MEDICINE | Facility: CLINIC | Age: 86
End: 2021-09-28
Payer: COMMERCIAL

## 2021-09-28 VITALS
DIASTOLIC BLOOD PRESSURE: 62 MMHG | SYSTOLIC BLOOD PRESSURE: 124 MMHG | OXYGEN SATURATION: 96 % | RESPIRATION RATE: 16 BRPM | TEMPERATURE: 97.7 F | WEIGHT: 155.6 LBS | HEIGHT: 70 IN | HEART RATE: 55 BPM | BODY MASS INDEX: 22.28 KG/M2

## 2021-09-28 DIAGNOSIS — Z23 NEED FOR PROPHYLACTIC VACCINATION AND INOCULATION AGAINST INFLUENZA: ICD-10-CM

## 2021-09-28 DIAGNOSIS — M25.561 ARTHRALGIA OF RIGHT LOWER LEG: ICD-10-CM

## 2021-09-28 DIAGNOSIS — D50.0 IRON DEFICIENCY ANEMIA DUE TO CHRONIC BLOOD LOSS: Primary | ICD-10-CM

## 2021-09-28 LAB
ERYTHROCYTE [DISTWIDTH] IN BLOOD BY AUTOMATED COUNT: 14.5 % (ref 10–15)
HCT VFR BLD AUTO: 36.9 % (ref 40–53)
HGB BLD-MCNC: 12 G/DL (ref 13.3–17.7)
MCH RBC QN AUTO: 29 PG (ref 26.5–33)
MCHC RBC AUTO-ENTMCNC: 32.5 G/DL (ref 31.5–36.5)
MCV RBC AUTO: 89 FL (ref 78–100)
PLATELET # BLD AUTO: 194 10E3/UL (ref 150–450)
RBC # BLD AUTO: 4.14 10E6/UL (ref 4.4–5.9)
WBC # BLD AUTO: 6.6 10E3/UL (ref 4–11)

## 2021-09-28 PROCEDURE — 36415 COLL VENOUS BLD VENIPUNCTURE: CPT | Performed by: INTERNAL MEDICINE

## 2021-09-28 PROCEDURE — 85027 COMPLETE CBC AUTOMATED: CPT | Performed by: INTERNAL MEDICINE

## 2021-09-28 PROCEDURE — 90662 IIV NO PRSV INCREASED AG IM: CPT | Performed by: INTERNAL MEDICINE

## 2021-09-28 PROCEDURE — 20610 DRAIN/INJ JOINT/BURSA W/O US: CPT | Mod: RT | Performed by: INTERNAL MEDICINE

## 2021-09-28 PROCEDURE — G0008 ADMIN INFLUENZA VIRUS VAC: HCPCS | Performed by: INTERNAL MEDICINE

## 2021-09-28 RX ORDER — TRIAMCINOLONE ACETONIDE 40 MG/ML
40 INJECTION, SUSPENSION INTRA-ARTICULAR; INTRAMUSCULAR ONCE
Status: COMPLETED | OUTPATIENT
Start: 2021-09-28 | End: 2021-09-28

## 2021-09-28 RX ORDER — LIDOCAINE HYDROCHLORIDE 10 MG/ML
3 INJECTION, SOLUTION INFILTRATION; PERINEURAL ONCE
Status: COMPLETED | OUTPATIENT
Start: 2021-09-28 | End: 2021-09-28

## 2021-09-28 RX ADMIN — TRIAMCINOLONE ACETONIDE 40 MG: 40 INJECTION, SUSPENSION INTRA-ARTICULAR; INTRAMUSCULAR at 15:37

## 2021-09-28 RX ADMIN — LIDOCAINE HYDROCHLORIDE 3 ML: 10 INJECTION, SOLUTION INFILTRATION; PERINEURAL at 15:36

## 2021-09-28 ASSESSMENT — MIFFLIN-ST. JEOR: SCORE: 1382.05

## 2021-09-28 NOTE — PROGRESS NOTES
Assessment & Plan     Iron deficiency anemia due to chronic blood loss  This is remaining fairly stable  Continue close monitoring   Recheck in 2 months   - **CBC with platelets FUTURE anytime  - Hemoglobin; Future    Arthralgia of right lower leg    Discussed options for management of severe right knee pain from advanced OA including seeing orthopedics and asking about knee replacement    He would like to try another injection    Potential risks of injection discussed including bleeding and infection; he would like to proceed    Right knee cortisone injection   After discussion of risks and benefits of the procedure including bleeding and infection, verbal informed consent was obtained.  Area prepped and draped in sterile fashion.  Local anesthesia was obtained with 1% lidocaine.   The suprapatellar pouch was entered using a lateral approach.  1cc of K40 was injected into the joint space.  The procedure was tolerated well and after care was discussed.        - Large Joint/Bursa injection and/or drainage (Shoulder, Knee)  - triamcinolone (KENALOG-40) injection 40 mg  - lidocaine 1 % injection 3 mL      30 minutes spent on the date of the encounter doing chart review, history and exam, documentation and further activities per the note           Return in about 2 months (around 11/28/2021) for recheck, Pre-visit Non-fasting Lab.  Patient instructed to return to clinic or contact us sooner if symptoms worsen or new symptoms develop.     Karson Bishop MD  [facility] RENETTA Akhtar is a 88 year old who presents for the following health issues     HPI Here for recheck on chronic blood loss anemia  Hemoglobin stable  Hx very challenging due hearing loss  Severe right knee pain  Requests another cortisone injection  Last injection was in May 2021 and helped for several months  Now pain severe  Also lateral left hip pain too      Review of Systems         Objective    /62 (BP  "Location: Right arm, Cuff Size: Adult Regular)   Pulse 55   Temp 97.7  F (36.5  C) (Tympanic)   Resp 16   Ht 1.778 m (5' 10\")   Wt 70.6 kg (155 lb 9.6 oz)   SpO2 96%   BMI 22.33 kg/m    Body mass index is 22.33 kg/m .  Physical Exam   GEN:  He is getting frailer, he is thin, not toxic  KNEE:  No right knee effusion, full right knee ROM, severe joint line tenderness, he is using a knee brace on the right, he favors the knee when he walks  HIP:  Left hip joint with full ROM, moderately tender bursa         Hemoglobin 12.6->12        "

## 2021-09-29 ENCOUNTER — TRANSFERRED RECORDS (OUTPATIENT)
Dept: HEALTH INFORMATION MANAGEMENT | Facility: CLINIC | Age: 86
End: 2021-09-29

## 2021-11-30 ENCOUNTER — OFFICE VISIT (OUTPATIENT)
Dept: FAMILY MEDICINE | Facility: CLINIC | Age: 86
End: 2021-11-30
Payer: COMMERCIAL

## 2021-11-30 VITALS
DIASTOLIC BLOOD PRESSURE: 74 MMHG | OXYGEN SATURATION: 97 % | WEIGHT: 162 LBS | HEIGHT: 70 IN | BODY MASS INDEX: 23.19 KG/M2 | SYSTOLIC BLOOD PRESSURE: 143 MMHG | RESPIRATION RATE: 16 BRPM | TEMPERATURE: 98 F | HEART RATE: 73 BPM

## 2021-11-30 DIAGNOSIS — D50.0 IRON DEFICIENCY ANEMIA DUE TO CHRONIC BLOOD LOSS: Primary | ICD-10-CM

## 2021-11-30 DIAGNOSIS — Z13.220 SCREENING FOR HYPERLIPIDEMIA: ICD-10-CM

## 2021-11-30 DIAGNOSIS — M70.61 GREATER TROCHANTERIC BURSITIS OF BOTH HIPS: ICD-10-CM

## 2021-11-30 DIAGNOSIS — M70.62 GREATER TROCHANTERIC BURSITIS OF BOTH HIPS: ICD-10-CM

## 2021-11-30 LAB — HGB BLD-MCNC: 12.5 G/DL (ref 13.3–17.7)

## 2021-11-30 PROCEDURE — 99213 OFFICE O/P EST LOW 20 MIN: CPT | Mod: 25 | Performed by: INTERNAL MEDICINE

## 2021-11-30 PROCEDURE — 36415 COLL VENOUS BLD VENIPUNCTURE: CPT | Performed by: INTERNAL MEDICINE

## 2021-11-30 PROCEDURE — 80061 LIPID PANEL: CPT | Performed by: INTERNAL MEDICINE

## 2021-11-30 PROCEDURE — 20605 DRAIN/INJ JOINT/BURSA W/O US: CPT | Performed by: INTERNAL MEDICINE

## 2021-11-30 PROCEDURE — 85018 HEMOGLOBIN: CPT | Performed by: INTERNAL MEDICINE

## 2021-11-30 RX ORDER — LIDOCAINE HYDROCHLORIDE 10 MG/ML
3 INJECTION, SOLUTION INFILTRATION; PERINEURAL ONCE
Status: DISCONTINUED | OUTPATIENT
Start: 2021-11-30 | End: 2022-04-10

## 2021-11-30 RX ORDER — TRIAMCINOLONE ACETONIDE 40 MG/ML
40 INJECTION, SUSPENSION INTRA-ARTICULAR; INTRAMUSCULAR ONCE
Status: DISCONTINUED | OUTPATIENT
Start: 2021-11-30 | End: 2022-04-10

## 2021-11-30 ASSESSMENT — MIFFLIN-ST. JEOR: SCORE: 1406.08

## 2021-11-30 NOTE — PROGRESS NOTES
Assessment & Plan     Iron deficiency anemia due to chronic blood loss  Stable, continue close monitoring of hemoglobin  - Hemoglobin    Greater trochanteric bursitis of both hips    Left greater trochanteric bursal cortisone injection  After discussion of risks and benefits, informed consent was obtained.  Area prepped and draped in sterile fashion.  Local anesthesia was obtained with 1% lidocaine.   1cc of K40 was injected into the bursal space.  The procedure was tolerate well and after care was discussed.         Right greater trochanteric bursa cortisone injection  After discussion of risks and benefits, informed consent was obtained.  Area prepped and draped in sterile fashion.  Local anesthesia was obtained with 1% lidocaine.   1cc of K40 was injected into the bursal space.  The procedure was tolerate well and after care was discussed.         - Drain/Inject Medium Joint/Bursa  (Hip bursa, Elbow, Carpal Tunnel)  - triamcinolone (KENALOG-40) injection 40 mg  - Medium Joint/Bursa injection and/or drainage (Elbow, TM, Wrist, Ankle)  - triamcinolone (KENALOG-40) injection 40 mg  - lidocaine 1 % injection 3 mL    Screening for hyperlipidemia    - Lipid panel reflex to direct LDL Fasting      25 minutes spent on the date of the encounter doing chart review, history and exam, documentation and further activities per the note           Return in about 1 month (around 12/30/2021) for Knee injection after 12/28.  Patient instructed to return to clinic or contact us sooner if symptoms worsen or new symptoms develop.     Karson Bishop MD  [facility] RENETTA Akhtar is a 89 year old who presents for the following health issues     HPI     Here to follow-up his chronic iron deficiency anemia due to chronic blood loss  Hemoglobin is stable  Complains of knee pain  Received SI joint injections from orthopedic surgery in October  Requests a knee injection today, but had most recent knee  "injection within 3-month interval  Also complains of pain in the bilateral greater trochanteric bursae  Requests injection in his bursae if he cannot have an injection in the knee today  Communication is very difficult due to profound hearing loss    Review of Systems         Objective    BP (!) 143/74 (BP Location: Right arm, Cuff Size: Adult Regular)   Pulse 73   Temp 98  F (36.7  C) (Tympanic)   Resp 16   Ht 1.778 m (5' 10\")   Wt 73.5 kg (162 lb)   SpO2 97%   BMI 23.24 kg/m    Body mass index is 23.24 kg/m .  Physical Exam   General: Well-appearing, stable appearing  Hips: No pain with passive range of motion of the bilateral hip joints, tender bilateral greater trochanteric bursae                  "

## 2021-11-30 NOTE — LETTER
December 2, 2021      Hermilo Velasquez  [address] [facility] [address]        Dear ,    We are writing to inform you of your test results.    The following letter pertains to your most recent diagnostic tests:     Good news! The cholesterol and hemoglobin are stable.         Sincerely,     Dr. Bishop/kw        Shilpa Orders   Hemoglobin   Result Value Ref Range    Hemoglobin 12.5 (L) 13.3 - 17.7 g/dL   Lipid panel reflex to direct LDL Fasting   Result Value Ref Range    Cholesterol 138 <200 mg/dL    Triglycerides 58 <150 mg/dL    Direct Measure HDL 55 >=40 mg/dL    LDL Cholesterol Calculated 71 <=100 mg/dL    Non HDL Cholesterol 83 <130 mg/dL    Patient Fasting > 8hrs? Yes     Narrative    Cholesterol  Desirable:  <200 mg/dL    Triglycerides  Normal:  Less than 150 mg/dL  Borderline High:  150-199 mg/dL  High:  200-499 mg/dL  Very High:  Greater than or equal to 500 mg/dL    Direct Measure HDL  Female:  Greater than or equal to 50 mg/dL   Male:  Greater than or equal to 40 mg/dL    LDL Cholesterol  Desirable:  <100mg/dL  Above Desirable:  100-129 mg/dL   Borderline High:  130-159 mg/dL   High:  160-189 mg/dL   Very High:  >= 190 mg/dL    Non HDL Cholesterol  Desirable:  130 mg/dL  Above Desirable:  130-159 mg/dL  Borderline High:  160-189 mg/dL  High:  190-219 mg/dL  Very High:  Greater than or equal to 220 mg/dL       If you have any questions or concerns, please call the clinic at the number listed above.       Sincerely,      Karson Bishop MD

## 2021-12-01 LAB
CHOLEST SERPL-MCNC: 138 MG/DL
FASTING STATUS PATIENT QL REPORTED: YES
HDLC SERPL-MCNC: 55 MG/DL
LDLC SERPL CALC-MCNC: 71 MG/DL
NONHDLC SERPL-MCNC: 83 MG/DL
TRIGL SERPL-MCNC: 58 MG/DL

## 2021-12-02 NOTE — RESULT ENCOUNTER NOTE
The following letter pertains to your most recent diagnostic tests:    Good news! The cholesterol and hemoglobin are stable.        Sincerely,    Dr. Bishop

## 2021-12-23 NOTE — RESULT ENCOUNTER NOTE
Incorrectly ordered under my name. I spoke with lab to route to correct provider. Pressure channel 1 zeroed.

## 2022-01-01 NOTE — TELEPHONE ENCOUNTER
"Patient requesting refill of duloxetine (CYMBALTA). Per chart review, this has been discontinued. Last prescribed in 2018. Patient states he has been taking it for a couple weeks. Pt states, \"I stopped taking the medication because he ran out.\" Requesting refill now. He states his neuropathy is back and bothering him. Tingling in feet. Stiffness.     RN unable to verify patient understanding of this medication. Recommend MTM referral to review medications and ensure patient understanding.     Routing refill request to provider for review/approval because:  Drug not active on patient's medication list            "
20.86

## 2022-01-01 NOTE — PROGRESS NOTES
Vascular Medicine Progress Note     Hermilo Velasquez is a 87 year old male who is here for follow-up on leg swelling, and follow-up on his CBC count    Interval History   Leg swelling is better patient will use compression stockings on and off  Patient was anemic he had scope/lower GI endoscopy revealing no source of bleeding his last hemoglobin was 7.4 after receiving 2 units of bumped up to  9.2, patient is asymptomatic    Physical Exam       BP: 128/56 Pulse: 89   Resp: 16 SpO2: 91 %      Vitals:    09/28/20 1039   Weight: 177 lb     Vital Signs with Ranges  Pulse:  [89] 89  Resp:  [16] 16  BP: (123-128)/(37-56) 128/56  SpO2:  [91 %] 91 %  [unfilled]    Constitutional: awake, alert, cooperative, no apparent distress, and appears stated age  Eyes: Lids and lashes normal, pupils equal, round and reactive to light, extra ocular muscles intact, sclera clear, conjunctiva normal  ENT: normocepalic, without obvious abnormality, oropharynx pink and moist  Hematologic / Lymphatic: no lymphadenopathy  Respiratory: No increased work of breathing, good air exchange, clear to auscultation bilaterally, no crackles or wheezing  Cardiovascular: regular rate and rhythm, normal S1 and S2 and no murmur noted  GI: Normal bowel sounds, soft, non-distended, non-tender  Skin: no redness, warmth, or swelling, no rashes and no lesions  Musculoskeletal: There is no redness, warmth, or swelling of the joints.  Full range of motion noted.  Motor strength is 5 out of 5 all extremities bilaterally.  Tone is normal.  Neurologic: Awake, alert, oriented to name, place and time.  Cranial nerves II-XII are grossly intact.  Motor is 5 out of 5 bilaterally.    Neuropsychiatric:  Normal affect, memory, insight.  Pulses: Leg swelling edema +1  . No carotid bruits appreciated.     Medications         Data   No results found for this or any previous visit (from the past 24 hour(s)).    Assessment & Plan   (D62) Anemia due to blood loss, acute   (primary encounter diagnosis)  Comment: No obvious cause for anemia so far we will be testing the patient for hemolytic activity  Plan: CBC WITH PLATELETS, Haptoglobin, Lactate         Dehydrogenase, RETICULOCYTE COUNT, Follow-Up         with Vascular Medicine, CANCELED: Lactate         Dehydrogenase, CANCELED: Haptoglobin, CANCELED:        RETICULOCYTE COUNT, CANCELED: CBC WITH         PLATELETS                  Summary: Continue wearing compression stockings    Jamie Ospina MD   17 2

## 2022-01-05 ENCOUNTER — DOCUMENTATION ONLY (OUTPATIENT)
Dept: LAB | Facility: CLINIC | Age: 87
End: 2022-01-05
Payer: COMMERCIAL

## 2022-01-05 DIAGNOSIS — D62 ANEMIA DUE TO BLOOD LOSS, ACUTE: Primary | ICD-10-CM

## 2022-01-05 NOTE — PROGRESS NOTES
Pt coming in for labs on 1/13/22, appt notes indicate 6 week follow-up labs. Microalbumin ordered, if other labs are necessary please place orders. Thank you.

## 2022-01-13 ENCOUNTER — LAB (OUTPATIENT)
Dept: LAB | Facility: CLINIC | Age: 87
End: 2022-01-13
Payer: COMMERCIAL

## 2022-01-13 ENCOUNTER — OFFICE VISIT (OUTPATIENT)
Dept: FAMILY MEDICINE | Facility: CLINIC | Age: 87
End: 2022-01-13
Payer: COMMERCIAL

## 2022-01-13 VITALS
SYSTOLIC BLOOD PRESSURE: 136 MMHG | DIASTOLIC BLOOD PRESSURE: 80 MMHG | OXYGEN SATURATION: 95 % | WEIGHT: 166 LBS | RESPIRATION RATE: 18 BRPM | HEART RATE: 76 BPM | TEMPERATURE: 98.7 F | HEIGHT: 70 IN | BODY MASS INDEX: 23.77 KG/M2

## 2022-01-13 DIAGNOSIS — I48.0 PAROXYSMAL ATRIAL FIBRILLATION (H): ICD-10-CM

## 2022-01-13 DIAGNOSIS — C34.91 PRIMARY MALIGNANT NEOPLASM OF RIGHT LUNG (H): ICD-10-CM

## 2022-01-13 DIAGNOSIS — N18.30 CKD (CHRONIC KIDNEY DISEASE) STAGE 3, GFR 30-59 ML/MIN (H): Primary | ICD-10-CM

## 2022-01-13 DIAGNOSIS — N18.30 STAGE 3 CHRONIC KIDNEY DISEASE, UNSPECIFIED WHETHER STAGE 3A OR 3B CKD (H): ICD-10-CM

## 2022-01-13 DIAGNOSIS — I50.32 CHRONIC DIASTOLIC CONGESTIVE HEART FAILURE (H): ICD-10-CM

## 2022-01-13 DIAGNOSIS — D50.0 IRON DEFICIENCY ANEMIA DUE TO CHRONIC BLOOD LOSS: ICD-10-CM

## 2022-01-13 DIAGNOSIS — D62 ANEMIA DUE TO BLOOD LOSS, ACUTE: ICD-10-CM

## 2022-01-13 DIAGNOSIS — J44.9 CHRONIC OBSTRUCTIVE PULMONARY DISEASE, UNSPECIFIED COPD TYPE (H): ICD-10-CM

## 2022-01-13 DIAGNOSIS — M17.11 PRIMARY LOCALIZED OSTEOARTHROSIS OF RIGHT LOWER LEG: Primary | ICD-10-CM

## 2022-01-13 LAB — HGB BLD-MCNC: 12.2 G/DL (ref 13.3–17.7)

## 2022-01-13 PROCEDURE — 85018 HEMOGLOBIN: CPT

## 2022-01-13 PROCEDURE — 99214 OFFICE O/P EST MOD 30 MIN: CPT | Mod: 25 | Performed by: INTERNAL MEDICINE

## 2022-01-13 PROCEDURE — 20610 DRAIN/INJ JOINT/BURSA W/O US: CPT | Performed by: INTERNAL MEDICINE

## 2022-01-13 PROCEDURE — 36415 COLL VENOUS BLD VENIPUNCTURE: CPT

## 2022-01-13 RX ORDER — TRIAMCINOLONE ACETONIDE 40 MG/ML
40 INJECTION, SUSPENSION INTRA-ARTICULAR; INTRAMUSCULAR ONCE
Status: COMPLETED | OUTPATIENT
Start: 2022-01-13 | End: 2022-01-13

## 2022-01-13 RX ORDER — LIDOCAINE HYDROCHLORIDE 10 MG/ML
3 INJECTION, SOLUTION INFILTRATION; PERINEURAL ONCE
Status: COMPLETED | OUTPATIENT
Start: 2022-01-13 | End: 2022-01-13

## 2022-01-13 RX ADMIN — LIDOCAINE HYDROCHLORIDE 3 ML: 10 INJECTION, SOLUTION INFILTRATION; PERINEURAL at 14:17

## 2022-01-13 RX ADMIN — TRIAMCINOLONE ACETONIDE 40 MG: 40 INJECTION, SUSPENSION INTRA-ARTICULAR; INTRAMUSCULAR at 14:17

## 2022-01-13 ASSESSMENT — MIFFLIN-ST. JEOR: SCORE: 1424.22

## 2022-01-13 NOTE — LETTER
January 14, 2022      Hermilo M Eva  [address] [facility] [address]        Dear ,    We are writing to inform you of your test results.    The following letter pertains to your most recent diagnostic tests:     Good news! The hemoglobin is stable.       Resulted Orders   Hemoglobin   Result Value Ref Range    Hemoglobin 12.2 (L) 13.3 - 17.7 g/dL       If you have any questions or concerns, please call the clinic at the number listed above.       Sincerely,      Karson Bishop MD

## 2022-01-13 NOTE — PATIENT INSTRUCTIONS
I know a scam when I see one.  Don't bother.  Sorry.   There is no scientific proof that a device like that will work to help your symptoms.  Unfortunately, there are no really good treatments for neuropathy.  That is why there are so many people out there trying to sell you 'snake oil' for neuropathy.

## 2022-01-13 NOTE — PROGRESS NOTES
"  Assessment & Plan     Primary localized osteoarthrosis of right lower leg    Right knee cortisone injection   After discussion of risks and benefits of the procedure including bleeding and infection, verbal informed consent was obtained.  Area prepped and draped in sterile fashion.  Local anesthesia was obtained with 1% lidocaine.   The suprapatellar pouch was entered using a lateral approach.  1cc of K40 was injected into the joint space.  The procedure was tolerated well and after care was discussed.      - Large Joint/Bursa injection and/or drainage (Shoulder, Knee)  - triamcinolone (KENALOG-40) injection 40 mg  - lidocaine 1 % injection 3 mL    Primary malignant neoplasm of right lung (H)  Post resection    Chronic obstructive pulmonary disease, unspecified COPD type (H)  Stable     Chronic diastolic congestive heart failure (H)  stable    Paroxysmal atrial fibrillation (H)  Rate controlled; on NOAC    Stage 3 chronic kidney disease, unspecified whether stage 3a or 3b CKD (H)  stable    Iron def anemia due to chronic blood loss  Stable     20 minutes spent on the date of the encounter doing chart review, history and exam, documentation and further activities per the note           Return in about 3 months (around 4/13/2022) for recheck.  Patient instructed to return to clinic or contact us sooner if symptoms worsen or new symptoms develop.     Karson Bishop MD  [facility] RENETTA Akhtar is a 89 year old who presents for the following health issues     HPI     1 Month follow up, Knee Injection(s)    Right knee still hurts  Over 3 months since last injection  Requests injection today  Hemoglobin remains stable  He had some questions about chronic neuropathy symptoms      Review of Systems         Objective    /80 (BP Location: Right arm, Patient Position: Sitting, Cuff Size: Adult Regular)   Pulse 76   Temp 98.7  F (37.1  C) (Temporal)   Resp 18   Ht 1.778 m (5' 10\")   " Wt 75.3 kg (166 lb)   SpO2 95%   BMI 23.82 kg/m    Body mass index is 23.82 kg/m .  Physical Exam   Comfortable appearing  No knee effusion, full ROM, improved symptoms after injection        Hemoglobin stable

## 2022-02-21 ENCOUNTER — NURSE TRIAGE (OUTPATIENT)
Dept: FAMILY MEDICINE | Facility: CLINIC | Age: 87
End: 2022-02-21
Payer: COMMERCIAL

## 2022-02-21 NOTE — TELEPHONE ENCOUNTER
Called home phone - rings and rings     Called mobile phone - reached patient and relayed provider response below     Yamilex EVERETT, Triage RN  [facility]

## 2022-02-21 NOTE — TELEPHONE ENCOUNTER
"Nurse Triage SBAR    Is this a 2nd Level Triage? YES, LICENSED PRACTITIONER REVIEW IS REQUIRED    Situation : Pt experiencing swelling on both legs but worse on the left. He notes his left toes are discolored blue/black and he is having a difficult time walking/putting shoes on.     Background : He states he has had this issue before, it comes and goes.     Assessment : see below    (See information below for more triage details.)    Recommendation : Routing to provider for recommendation on protocol states ED/UC or office with PCP approval. Pt was advised to be seen urgently today but he does not want to go to ED or UC he wants to be seen in clinic. Routing for PCP review and advise.     Protocol Recommended Disposition: Immediate office evaluation/ED/UC    OK to leave detailed vm.     Reason for Disposition    Patient sounds very sick or weak to the triager    Additional Information    Negative: Sounds like a life-threatening emergency to the triager    Negative: Chest pain    Negative: Small area of swelling and followed an insect bite to the area    Negative: Followed a knee injury    Negative: Ankle or foot injury    Negative: Pregnant with leg swelling or edema    Negative: Difficulty breathing at rest    Negative: Entire foot is cool or blue in comparison to other side    Negative: SEVERE swelling (e.g., swelling extends above knee, entire leg is swollen, weeping fluid)    Negative: Thigh or calf pain and only 1 side and present > 1 hour    Negative: Thigh, calf, or ankle swelling in only one leg    Negative: Thigh, calf, or ankle swelling in both legs, but one side is definitely more swollen (Exception: longstanding difference between legs)    Negative: Cast on leg or ankle and has increasing pain    Negative: Can't walk or can barely stand (new onset)    Negative: Fever and red area (or area very tender to touch)    Answer Assessment - Initial Assessment Questions  1. ONSET: \"When did the swelling start?\" " "(e.g., minutes, hours, days)        Started about 3 days ago.     2. LOCATION: \"What part of the leg is swollen?\"  \"Are both legs swollen or just one leg?\"        From foot to the knee, left leg. Right leg also swollen but not as much.     3. SEVERITY: \"How bad is the swelling?\" (e.g., localized; mild, moderate, severe)   - Localized - small area of swelling localized to one leg   - MILD pedal edema - swelling limited to foot and ankle, pitting edema < 1/4 inch (6 mm) deep, rest and elevation eliminate most or all swelling   - MODERATE edema - swelling of lower leg to knee, pitting edema > 1/4 inch (6 mm) deep, rest and elevation only partially reduce swelling   - SEVERE edema - swelling extends above knee, facial or hand swelling present         Moderate.     4. REDNESS: \"Does the swelling look red or infected?\"        Left toes look purple or black.     5. PAIN: \"Is the swelling painful to touch?\" If so, ask: \"How painful is it?\"   (Scale 1-10; mild, moderate or severe)        To touch, doesn't really hurt. Mild if any.     6. FEVER: \"Do you have a fever?\" If so, ask: \"What is it, how was it measured, and when did it start?\"         No.     7. CAUSE: \"What do you think is causing the leg swelling?\"        Unsure.     8. MEDICAL HISTORY: \"Do you have a history of heart failure, kidney disease, liver failure, or cancer?\"        No. History of valve replacement     9. RECURRENT SYMPTOM: \"Have you had leg swelling before?\" If so, ask: \"When was the last time?\" \"What happened that time?\"        Yes, has had this in the past and took medication for it. He is unsure, probably water pill.     10. OTHER SYMPTOMS: \"Do you have any other symptoms?\" (e.g., chest pain, difficulty breathing)          No.     11. PREGNANCY: \"Is there any chance you are pregnant?\" \"When was your last menstrual period?\"          NA    Protocols used: LEG SWELLING AND EDEMA-A-OH    GO TO ED/UCC NOW (OR TO OFFICE WITH PCP APPROVAL): * After using " nurse judgment regarding the most appropriate site, tell the caller:. Leave NOW.   * TRIAGER CAUTION: In selecting the most appropriate care site, you must consider both the severity of the patient's symptoms AND what resources are available at that care site.  * ED: Patients who may need surgery, sound seriously ill or may be unstable need to be sent to an ED. Likewise, so do most patients with complex medical problems and serious symptoms.  * UCC: Some UCCs can manage patients who are stable and have less serious symptoms (e.g., minor illnesses and injuries). The triager must know the UCC capabilities before sending a patient there. If unsure, call ahead.  * OFFICE: If patient sounds stable and not seriously ill, consult PCP (or follow your office policy) to see if patient can be seen NOW in office.      CALL BACK IF:  * Swelling persists over 3 days  * Swelling becomes red or painful to the touch  * You become worse        Patient/Caregiver understands and will follow care advice? Other, see documentation     Radha ESTEBAN RN  [facility]

## 2022-03-09 ENCOUNTER — NURSE TRIAGE (OUTPATIENT)
Dept: FAMILY MEDICINE | Facility: CLINIC | Age: 87
End: 2022-03-09
Payer: COMMERCIAL

## 2022-03-09 NOTE — TELEPHONE ENCOUNTER
"Patient fell past Saturday. He was in [facility] and was discharged yesterday.     Patient had pneumonia with fever.  He now has fatigue . He will come to the clinic tomorrow post ED.     Appointment scheduled for tomorrow for post hospital.     Reason for Disposition    [1] MODERATE weakness (i.e., interferes with work, school, normal activities) AND [2] persists > 3 days    [1] MILD weakness (i.e., does not interfere with ability to work, go to school, normal activities) AND [2] persists > 1 week    [1] Fatigue (i.e., tires easily, decreased energy) AND [2] persists > 1 week    Additional Information    Negative: Severe difficulty breathing (e.g., struggling for each breath, speaks in single words)    Negative: Shock suspected (e.g., cold/pale/clammy skin, too weak to stand, low BP, rapid pulse)    Negative: Difficult to awaken or acting confused (e.g., disoriented, slurred speech)    Negative: [1] Fainted > 15 minutes ago AND [2] still feels too weak or dizzy to stand    Negative: [1] SEVERE weakness (i.e., unable to walk or barely able to walk, requires support) AND [2] new onset or worsening    Negative: Sounds like a life-threatening emergency to the triager    Negative: Weakness of the face, arm or leg on one side of the body    Negative: [1] Has diabetes (diabetes mellitus) AND [2] weakness from low blood sugar (i.e., < 60 mg/dl or 3.5 mmol/l)    Negative: Heat exhaustion suspected (i.e., dehydration from heat exposure)    Negative: Chest pain    Negative: Vomiting is main symptom    Negative: Diarrhea is main symptom    Negative: Difficulty breathing    Negative: Heart beating < 50 beats per minute OR > 140 beats per minute    Negative: Extra heart beats OR irregular heart beating   (i.e., \"palpitations\")    Negative: Follows bleeding (e.g., from vomiting, rectum, vagina; Exception: small brief weakness from sight of a small amount blood)    Negative: Black or tarry bowel movements    Negative: " "[1] Drinking very little AND [2] dehydration suspected (e.g., no urine > 12 hours, very dry mouth, very lightheaded)    Negative: Patient sounds very sick or weak to the triager    Negative: [1] MODERATE weakness (i.e., interferes with work, school, normal activities) AND [2] cause unknown  (Exceptions: weakness with acute minor illness, or weakness from poor fluid intake)    Negative: [1] MODERATE weakness AND [2] from poor fluid intake AND [3] no improvement after 2 hours of rest and fluids    Negative: [1] Fever > 103 F (39.4 C) AND [2] not able to get the fever down using Fever Care Advice    Negative: [1] Fever > 101 F (38.3 C) AND [2] age > 60    Negative: [1] Fever > 100.0 F (37.8 C) AND [2] bedridden (e.g., nursing home patient, CVA, chronic illness, recovering from surgery)    Negative: [1] Fever > 100.0 F (37.8 C) AND [2] diabetes mellitus or weak immune system (e.g., HIV positive, cancer chemo, splenectomy, organ transplant, chronic steroids)    Negative: Pale skin (pallor)    Negative: Taking a medicine that could cause weakness (e.g., blood pressure medications, diuretics)    Negative: Weakness is a chronic symptom (recurrent or ongoing AND present > 4 weeks)    Negative: Fatigue is a chronic symptom (recurrent or ongoing AND present > 4 weeks)    Negative: Poor fluid intake probably caused the weakness    Negative: Mild weakness or fatigue with acute minor illness (e.g., colds)    Answer Assessment - Initial Assessment Questions  1. DESCRIPTION: \"Describe how you are feeling.\"      Very tired from pnuemonia.   2. SEVERITY: \"How bad is it?\"  \"Can you stand and walk?\"    - MILD - Feels weak or tired, but does not interfere with work, school or normal activities    - MODERATE - Able to stand and walk; weakness interferes with work, school, or normal activities    - SEVERE - Unable to stand or walk      Moderate.   3. ONSET:  \"When did the weakness begin?\"      Before fall.   4. CAUSE: \"What do you think " "is causing the weakness?\"      Pneumonia.   5. MEDICINES: \"Have you recently started a new medicine or had a change in the amount of a medicine?\"      Yes  6. OTHER SYMPTOMS: \"Do you have any other symptoms?\" (e.g., chest pain, fever, cough, SOB, vomiting, diarrhea, bleeding, other areas of pain)      No  7. PREGNANCY: \"Is there any chance you are pregnant?\" \"When was your last menstrual period?\"      No    Protocols used: WEAKNESS (GENERALIZED) AND FATIGUE-KENNETH BettencourtRN  -[facility]       "

## 2022-03-10 ENCOUNTER — OFFICE VISIT (OUTPATIENT)
Dept: FAMILY MEDICINE | Facility: CLINIC | Age: 87
End: 2022-03-10
Payer: COMMERCIAL

## 2022-03-10 VITALS
HEIGHT: 70 IN | HEART RATE: 101 BPM | TEMPERATURE: 97.7 F | OXYGEN SATURATION: 90 % | SYSTOLIC BLOOD PRESSURE: 102 MMHG | DIASTOLIC BLOOD PRESSURE: 59 MMHG | WEIGHT: 169 LBS | RESPIRATION RATE: 16 BRPM | BODY MASS INDEX: 24.2 KG/M2

## 2022-03-10 DIAGNOSIS — E78.2 MIXED HYPERLIPIDEMIA: ICD-10-CM

## 2022-03-10 DIAGNOSIS — J18.9 PNEUMONIA OF LEFT LOWER LOBE DUE TO INFECTIOUS ORGANISM: Primary | ICD-10-CM

## 2022-03-10 DIAGNOSIS — J44.9 CHRONIC OBSTRUCTIVE PULMONARY DISEASE, UNSPECIFIED COPD TYPE (H): ICD-10-CM

## 2022-03-10 DIAGNOSIS — J43.2 CENTRILOBULAR EMPHYSEMA (H): ICD-10-CM

## 2022-03-10 DIAGNOSIS — I50.32 CHRONIC DIASTOLIC CONGESTIVE HEART FAILURE (H): ICD-10-CM

## 2022-03-10 PROCEDURE — 99495 TRANSJ CARE MGMT MOD F2F 14D: CPT | Performed by: INTERNAL MEDICINE

## 2022-03-10 RX ORDER — PREDNISONE 20 MG/1
20 TABLET ORAL 2 TIMES DAILY
Qty: 20 TABLET | Refills: 1 | Status: SHIPPED | OUTPATIENT
Start: 2022-03-10 | End: 2022-03-20

## 2022-03-10 ASSESSMENT — PAIN SCALES - GENERAL: PAINLEVEL: NO PAIN (0)

## 2022-03-10 NOTE — PROGRESS NOTES
Subjective   Hermilo is a 89 year old who presents for the following health issues  accompanied by his spouse.    HPI       Hospital Follow-up Visit:    Hospital/Nursing Home/IP Rehab Facility: [facility]    Date of Admission: 03/05/2022  Date of Discharge: 03/08/2022  Reason(s) for Admission:     Lobar pneumonia       Was your hospitalization related to COVID-19? No   Problems taking medications regularly:  None  Medication changes since discharge: None  Problems adhering to non-medication therapy:  None    Summary of hospitalization:  [facility] discharge summary reviewed  Diagnostic Tests/Treatments reviewed.  Follow up needed: pulm medicine clinic  Other Healthcare Providers Involved in Patient s Care:         cardiology  Update since discharge: stable.  Post Discharge Medication Reconciliation: discharge medications reconciled and changed, per note/orders.  Plan of care communicated with patient and family       Chief Complaint:     Post hospital discharge follow up of pneumonia    HPI:   Patient Hermilo Velasquez is a very pleasant 89 year old male with history of CHF, CKD, COPD who presents to Internal Medicine clinic today for post hospital discharge follow up of pneumonia. Regarding the patient's recent pneumonia symptoms, the patient still complains of some COPD flare up, cough symptoms. No chest pain, headaches, fever or chills at this time. patient no longer smoking tobacco, former long standing history of tobacco dependence.       Current Medications:     Current Outpatient Medications   Medication Sig Dispense Refill     ACE/ARB/ARNI NOT PRESCRIBED (INTENTIONAL) Please choose reason not prescribed from choices below.       albuterol (PROAIR HFA/PROVENTIL HFA/VENTOLIN HFA) 108 (90 Base) MCG/ACT inhaler Inhale 1-2 puffs into the lungs every 6 hours as needed for shortness of breath / dyspnea or wheezing 3 Inhaler 5     albuterol (PROVENTIL) (2.5 MG/3ML) 0.083% neb solution  "Take 1 vial (2.5 mg) by nebulization every 6 hours as needed for shortness of breath / dyspnea or wheezing 180 mL 11     apixaban ANTICOAGULANT (ELIQUIS ANTICOAGULANT) 5 MG tablet Take 1 tablet (5 mg) by mouth 2 times daily 180 tablet 3     atorvastatin (LIPITOR) 10 MG tablet Take 1 tablet (10 mg) by mouth daily 90 tablet 2     clotrimazole (LOTRIMIN) 1 % external cream Apply topically 2 times daily 15 g 1     DULoxetine (CYMBALTA) 30 MG capsule Take 60 mg by mouth daily       famotidine (PEPCID) 40 MG tablet Take 1 tablet (40 mg) by mouth 2 times daily 180 tablet 2     ferrous sulfate (FE TABS) 325 (65 Fe) MG EC tablet Take 1 tablet (325 mg) by mouth daily 90 tablet 3     fluticasone-vilanterol (BREO ELLIPTA) 200-25 MCG/INH inhaler Inhale 1 puff into the lungs daily 1 each 0     gabapentin (NEURONTIN) 300 MG capsule Take 600 mg by mouth At Bedtime       guaiFENesin (ROBITUSSIN) 20 mg/mL SOLN solution Take 10 mLs by mouth every 4 hours as needed for other (secretion expectorant) 118 mL 0     hydrocortisone (CORTAID) 1 % external cream Apply topically 2 times daily 15 g 1     hydrOXYzine (VISTARIL) 50 MG capsule Take 50 mg by mouth every 6 hours as needed for itching       metoprolol succinate ER (TOPROL-XL) 25 MG 24 hr tablet Take 0.5 tablets (12.5 mg) by mouth daily 45 tablet 0     predniSONE (DELTASONE) 20 MG tablet Take 1 tablet (20 mg) by mouth 2 times daily for 10 days 20 tablet 1     tiotropium (SPIRIVA HANDIHALER) 18 MCG inhaled capsule Inhale 1 capsule (18 mcg) into the lungs daily 90 capsule 3     triamcinolone (KENALOG) 0.1 % external cream Apply topically 2 times daily as needed for irritation 453.6 g 3         Allergies:      Allergies   Allergen Reactions     Lidocaine Blisters     Allergy to lidocaine ointment     Omeprazole Itching     Pantoprazole Itching     Prevacid [Lansoprazole] Itching     Lasix [Furosemide] Rash     Penicillins Rash     \"broke out from injection\" 60 yrs ago              Past " Medical History:     Past Medical History:   Diagnosis Date     Adenocarcinoma, lung, 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 Vasques      Aortic stenosis     Severe AS, 9/2015 AVR - [facility] TRIFECTA Bovine bioprosthesis 25MM TF-25A     Atrial fibrillation (H)     9/2015 Paroxysmal post op Afib - discharged on Warfarin and a beta blocker     COPD (chronic obstructive pulmonary disease) (H)      Deep vein thrombosis (H)      GERD (gastroesophageal reflux disease)      Heart murmur      Monoclonal gammopathy     plasmacyte prominent causing monoclonal gammopathy     Need for SBE (subacute bacterial endocarditis) prophylaxis      Neuropathy      Other and unspecified hyperlipidemia      Other malignant lymphomas     non hodgkin's lymphoma     RBBB (right bundle branch block)      Severe sepsis with acute organ dysfunction (H) 11/16/2015     Unspecified hereditary and idiopathic peripheral neuropathy          Past Surgical History:     Past Surgical History:   Procedure Laterality Date     APPENDECTOMY       AS TOTAL KNEE ARTHROPLASTY       BACK SURGERY       ESOPHAGOSCOPY, GASTROSCOPY, DUODENOSCOPY (EGD), COMBINED N/A 11/28/2015    Procedure: COMBINED ESOPHAGOSCOPY, GASTROSCOPY, DUODENOSCOPY (EGD);  Surgeon: Danis Castillo MD;  Location: [facility]     ESOPHAGOSCOPY, GASTROSCOPY, DUODENOSCOPY (EGD), COMBINED N/A 7/26/2018    Procedure: COMBINED ESOPHAGOSCOPY, GASTROSCOPY, DUODENOSCOPY (EGD);;  Surgeon: Toby Dong DO;  Location: [facility]     ESOPHAGOSCOPY, GASTROSCOPY, DUODENOSCOPY (EGD), COMBINED N/A 8/17/2020    Procedure: ESOPHAGOGASTRODUODENOSCOPY (EGD);  Surgeon: Herrera Henry MD;  Location:  GI     ESOPHAGOSCOPY, GASTROSCOPY, DUODENOSCOPY (EGD), COMBINED N/A 10/24/2020    Procedure: ESOPHAGOGASTRODUODENOSCOPY (EGD);  Surgeon: Vick Florentino MD;  Location:  GI     HERNIA REPAIR  2006     HERNIORRHAPHY VENTRAL  4/17/2013    Procedure: HERNIORRHAPHY VENTRAL;  VENTRAL HERNIA  REPAIR WITH MESH;  Surgeon: Patel Guzman MD;  Location:  OR     KNEE SURGERY      arthroscopic right knee surgery      LOBECTOMY LUNG Right 3/26/2019    POSSIBLE LOBECTOMY LUNG per Dr. Vasques at [facility]     REPAIR LIGAMENT ANKLE  2012    Procedure:REPAIR LIGAMENT ANKLE; LEFT TARSAL TUNNEL RELEASE OF KNOT OF ARIEL RELEASE; Surgeon:SAUL PUENTE; Location: OR     REPLACE VALVE AORTIC N/A 9/3/2015    Procedure: REPLACE VALVE AORTIC;  Surgeon: Antonino Mitchell MD;  Location:  OR     ROTATOR CUFF REPAIR RT/LT      bilateral     SPINE SURGERY      3 spine surgeries     THORACOTOMY, WEDGE RESECTION LUNG, COMBINED Right 3/26/2019    RIGHT THORACOTOMY, WEDGE RESECTION, RIGHT LOWER LOBE LUNG NODULE,;  Surgeon: Jose A Vasques MD;  Location:  OR     TONSILLECTOMY       TURP           Family Medical History:     Family History   Problem Relation Age of Onset     C.A.D. Father      Emphysema Father      Melanoma No family hx of      Skin Cancer No family hx of          Social History:     Social History     Socioeconomic History     Marital status:      Spouse name: Not on file     Number of children: Not on file     Years of education: Not on file     Highest education level: Not on file   Occupational History     Not on file   Tobacco Use     Smoking status: Former Smoker     Packs/day: 2.00     Years: 55.00     Pack years: 110.00     Quit date: 1998     Years since quittin.1     Smokeless tobacco: Never Used   Substance and Sexual Activity     Alcohol use: Yes     Alcohol/week: 0.0 standard drinks     Comment: 1 drink per day     Drug use: No     Sexual activity: Not Currently     Partners: Female   Other Topics Concern     Parent/sibling w/ CABG, MI or angioplasty before 65F 55M? Not Asked      Service Not Asked     Blood Transfusions Not Asked     Caffeine Concern No     Comment: occ     Occupational Exposure Not Asked     Hobby Hazards Not Asked      "Sleep Concern Not Asked     Stress Concern Not Asked     Weight Concern Not Asked     Special Diet No     Back Care Not Asked     Exercise No     Bike Helmet Not Asked     Seat Belt Yes     Self-Exams Not Asked   Social History Narrative    , [place]    Retired  - self employed     Social Determinants of Health     Financial Resource Strain: Low Risk      Difficulty of Paying Living Expenses: Not hard at all   Food Insecurity: No Food Insecurity     Worried About Running Out of Food in the Last Year: Never true     Ran Out of Food in the Last Year: Never true   Transportation Needs: No Transportation Needs     Lack of Transportation (Medical): No     Lack of Transportation (Non-Medical): No   Physical Activity: Not on file   Stress: Not on file   Social Connections: Not on file   Intimate Partner Violence: Not At Risk     Fear of Current or Ex-Partner: No     Emotionally Abused: No     Physically Abused: No     Sexually Abused: No   Housing Stability: Not on file           Review of System:     Constitutional: Negative for fever or chills  Skin: Negative for rashes  Ears/Nose/Throat: Negative for nasal congestion, sore throat  Respiratory: positive for COPD, recent hospitalization for pneumonia  Cardiovascular: Negative for chest pain  Gastrointestinal: Negative for nausea, vomiting  Genitourinary: Negative for dysuria, hematuria  Musculoskeletal: Negative for myalgias  Neurologic: Negative for headaches  Psychiatric: Negative for depression, anxiety  Hematologic/Lymphatic/Immunologic: Negative  Endocrine: Negative  Behavioral: Negative for tobacco use currently, former heavy tobacco dependence currently in remission      Physical Exam:   /59 (BP Location: Left arm, Patient Position: Sitting, Cuff Size: Adult Regular)   Pulse 101   Temp 97.7  F (36.5  C) (Temporal)   Resp 16   Ht 1.778 m (5' 10\")   Wt 76.7 kg (169 lb)   SpO2 90%   BMI 24.25 kg/m      GENERAL: " chronically ill appearing elderly male, alert and no acute distress  EYES: eyes grossly normal to inspection, and conjunctivae and sclerae normal  HENT: Normocephalic atraumatic. Nose and mouth without ulcers or lesions  NECK: supple  RESP: COPD symptoms noted   CV: regular rate and rhythm, normal S1 S2  LYMPH: no peripheral edema   ABDOMEN: nondistended  MS: no gross musculoskeletal defects noted  SKIN: no suspicious lesions or rashes  NEURO: Alert & Oriented x 3.   PSYCH: mentation appears normal, affect normal        Diagnostic Test Results:     Diagnostic Test Results:  Labs reviewed in Epic    ASSESSMENT/PLAN:       Hermilo was seen today for hospital f/u.    Diagnoses and all orders for this visit:    Pneumonia of left lower lobe due to infectious organism  -     predniSONE (DELTASONE) 20 MG tablet; Take 1 tablet (20 mg) by mouth 2 times daily for 10 days    Centrilobular emphysema (H)  Chronic obstructive pulmonary disease, unspecified COPD type (H)  -     predniSONE (DELTASONE) 20 MG tablet; Take 1 tablet (20 mg) by mouth 2 times daily for 10 days  -     Adult Pulmonary Medicine Referral; Future    Chronic diastolic congestive heart failure (H)  -    Stable, continue current cardiac medications    Mixed hyperlipidemia  - stable, continue current therapy        Follow Up Plan:     Patient is instructed to return to Internal Medicine clinic for follow-up visit in 1 week.        Liv Mc MD  Internal Medicine  [facility]

## 2022-03-29 ENCOUNTER — TRANSFERRED RECORDS (OUTPATIENT)
Dept: HEALTH INFORMATION MANAGEMENT | Facility: CLINIC | Age: 87
End: 2022-03-29
Payer: COMMERCIAL

## 2022-04-10 ENCOUNTER — APPOINTMENT (OUTPATIENT)
Dept: CT IMAGING | Facility: CLINIC | Age: 87
DRG: 871 | End: 2022-04-10
Attending: EMERGENCY MEDICINE
Payer: COMMERCIAL

## 2022-04-10 ENCOUNTER — APPOINTMENT (OUTPATIENT)
Dept: GENERAL RADIOLOGY | Facility: CLINIC | Age: 87
DRG: 871 | End: 2022-04-10
Attending: EMERGENCY MEDICINE
Payer: COMMERCIAL

## 2022-04-10 ENCOUNTER — HOSPITAL ENCOUNTER (INPATIENT)
Facility: CLINIC | Age: 87
LOS: 3 days | Discharge: HOME | DRG: 871 | End: 2022-04-13
Attending: EMERGENCY MEDICINE | Admitting: HOSPITALIST
Payer: COMMERCIAL

## 2022-04-10 DIAGNOSIS — K92.2 GASTROINTESTINAL HEMORRHAGE, UNSPECIFIED GASTROINTESTINAL HEMORRHAGE TYPE: Primary | ICD-10-CM

## 2022-04-10 DIAGNOSIS — J18.9 PNEUMONIA OF LEFT LOWER LOBE DUE TO INFECTIOUS ORGANISM: ICD-10-CM

## 2022-04-10 DIAGNOSIS — D50.9 IRON DEFICIENCY ANEMIA, UNSPECIFIED IRON DEFICIENCY ANEMIA TYPE: ICD-10-CM

## 2022-04-10 DIAGNOSIS — R65.20 SEVERE SEPSIS (H): ICD-10-CM

## 2022-04-10 DIAGNOSIS — A41.9 SEVERE SEPSIS (H): ICD-10-CM

## 2022-04-10 DIAGNOSIS — J44.9 CHRONIC OBSTRUCTIVE PULMONARY DISEASE, UNSPECIFIED COPD TYPE (H): ICD-10-CM

## 2022-04-10 LAB
ALBUMIN SERPL-MCNC: 2.6 G/DL (ref 3.4–5)
ALBUMIN UR-MCNC: 30 MG/DL
ALP SERPL-CCNC: 91 U/L (ref 40–150)
ALT SERPL W P-5'-P-CCNC: 10 U/L (ref 0–70)
ANION GAP SERPL CALCULATED.3IONS-SCNC: 5 MMOL/L (ref 3–14)
APPEARANCE UR: CLEAR
AST SERPL W P-5'-P-CCNC: 11 U/L (ref 0–45)
BASOPHILS # BLD AUTO: 0 10E3/UL (ref 0–0.2)
BASOPHILS NFR BLD AUTO: 0 %
BILIRUB SERPL-MCNC: 0.5 MG/DL (ref 0.2–1.3)
BILIRUB UR QL STRIP: NEGATIVE
BUN SERPL-MCNC: 29 MG/DL (ref 7–30)
CALCIUM SERPL-MCNC: 8.4 MG/DL (ref 8.5–10.1)
CHLORIDE BLD-SCNC: 107 MMOL/L (ref 94–109)
CO2 SERPL-SCNC: 26 MMOL/L (ref 20–32)
COLOR UR AUTO: YELLOW
CREAT SERPL-MCNC: 1.59 MG/DL (ref 0.66–1.25)
EOSINOPHIL # BLD AUTO: 0.5 10E3/UL (ref 0–0.7)
EOSINOPHIL NFR BLD AUTO: 3 %
ERYTHROCYTE [DISTWIDTH] IN BLOOD BY AUTOMATED COUNT: 14.1 % (ref 10–15)
FLUAV RNA SPEC QL NAA+PROBE: NEGATIVE
FLUBV RNA RESP QL NAA+PROBE: NEGATIVE
GFR SERPL CREATININE-BSD FRML MDRD: 41 ML/MIN/1.73M2
GLUCOSE BLD-MCNC: 134 MG/DL (ref 70–99)
GLUCOSE UR STRIP-MCNC: NEGATIVE MG/DL
HCO3 BLDV-SCNC: 26 MMOL/L (ref 21–28)
HCT VFR BLD AUTO: 26.7 % (ref 40–53)
HGB BLD-MCNC: 8 G/DL (ref 13.3–17.7)
HGB UR QL STRIP: ABNORMAL
HYALINE CASTS: 3 /LPF
IMM GRANULOCYTES # BLD: 0.1 10E3/UL
IMM GRANULOCYTES NFR BLD: 1 %
KETONES UR STRIP-MCNC: NEGATIVE MG/DL
LACTATE BLD-SCNC: 2.5 MMOL/L
LACTATE SERPL-SCNC: 1.1 MMOL/L (ref 0.7–2)
LEUKOCYTE ESTERASE UR QL STRIP: NEGATIVE
LYMPHOCYTES # BLD AUTO: 0.9 10E3/UL (ref 0.8–5.3)
LYMPHOCYTES NFR BLD AUTO: 5 %
MCH RBC QN AUTO: 27.1 PG (ref 26.5–33)
MCHC RBC AUTO-ENTMCNC: 30 G/DL (ref 31.5–36.5)
MCV RBC AUTO: 91 FL (ref 78–100)
MONOCYTES # BLD AUTO: 1.6 10E3/UL (ref 0–1.3)
MONOCYTES NFR BLD AUTO: 9 %
MUCOUS THREADS #/AREA URNS LPF: PRESENT /LPF
NEUTROPHILS # BLD AUTO: 14 10E3/UL (ref 1.6–8.3)
NEUTROPHILS NFR BLD AUTO: 82 %
NITRATE UR QL: NEGATIVE
NRBC # BLD AUTO: 0 10E3/UL
NRBC BLD AUTO-RTO: 0 /100
PCO2 BLDV: 46 MM HG (ref 40–50)
PH BLDV: 7.36 [PH] (ref 7.32–7.43)
PH UR STRIP: 5.5 [PH] (ref 5–7)
PLATELET # BLD AUTO: 401 10E3/UL (ref 150–450)
PO2 BLDV: 36 MM HG (ref 25–47)
POTASSIUM BLD-SCNC: 4.5 MMOL/L (ref 3.4–5.3)
PROT SERPL-MCNC: 5.9 G/DL (ref 6.8–8.8)
RBC # BLD AUTO: 2.95 10E6/UL (ref 4.4–5.9)
RBC URINE: >182 /HPF
RSV RNA SPEC NAA+PROBE: NEGATIVE
SAO2 % BLDV: 65 % (ref 94–100)
SARS-COV-2 RNA RESP QL NAA+PROBE: NEGATIVE
SODIUM SERPL-SCNC: 138 MMOL/L (ref 133–144)
SP GR UR STRIP: 1.02 (ref 1–1.03)
UROBILINOGEN UR STRIP-MCNC: NORMAL MG/DL
WBC # BLD AUTO: 17.1 10E3/UL (ref 4–11)
WBC URINE: 13 /HPF

## 2022-04-10 PROCEDURE — 87040 BLOOD CULTURE FOR BACTERIA: CPT | Performed by: EMERGENCY MEDICINE

## 2022-04-10 PROCEDURE — 250N000011 HC RX IP 250 OP 636: Performed by: EMERGENCY MEDICINE

## 2022-04-10 PROCEDURE — 96361 HYDRATE IV INFUSION ADD-ON: CPT

## 2022-04-10 PROCEDURE — C9803 HOPD COVID-19 SPEC COLLECT: HCPCS

## 2022-04-10 PROCEDURE — 87086 URINE CULTURE/COLONY COUNT: CPT | Performed by: EMERGENCY MEDICINE

## 2022-04-10 PROCEDURE — 96375 TX/PRO/DX INJ NEW DRUG ADDON: CPT

## 2022-04-10 PROCEDURE — 82803 BLOOD GASES ANY COMBINATION: CPT

## 2022-04-10 PROCEDURE — 36415 COLL VENOUS BLD VENIPUNCTURE: CPT | Performed by: EMERGENCY MEDICINE

## 2022-04-10 PROCEDURE — 71250 CT THORAX DX C-: CPT

## 2022-04-10 PROCEDURE — 85004 AUTOMATED DIFF WBC COUNT: CPT | Performed by: EMERGENCY MEDICINE

## 2022-04-10 PROCEDURE — 81001 URINALYSIS AUTO W/SCOPE: CPT | Performed by: EMERGENCY MEDICINE

## 2022-04-10 PROCEDURE — 120N000001 HC R&B MED SURG/OB

## 2022-04-10 PROCEDURE — 99223 1ST HOSP IP/OBS HIGH 75: CPT | Mod: AI | Performed by: HOSPITALIST

## 2022-04-10 PROCEDURE — 71045 X-RAY EXAM CHEST 1 VIEW: CPT

## 2022-04-10 PROCEDURE — 258N000003 HC RX IP 258 OP 636: Performed by: EMERGENCY MEDICINE

## 2022-04-10 PROCEDURE — 80053 COMPREHEN METABOLIC PANEL: CPT | Performed by: EMERGENCY MEDICINE

## 2022-04-10 PROCEDURE — 93005 ELECTROCARDIOGRAM TRACING: CPT

## 2022-04-10 PROCEDURE — 83605 ASSAY OF LACTIC ACID: CPT | Performed by: EMERGENCY MEDICINE

## 2022-04-10 PROCEDURE — 87637 SARSCOV2&INF A&B&RSV AMP PRB: CPT | Performed by: EMERGENCY MEDICINE

## 2022-04-10 PROCEDURE — 99285 EMERGENCY DEPT VISIT HI MDM: CPT | Mod: 25

## 2022-04-10 PROCEDURE — 83605 ASSAY OF LACTIC ACID: CPT

## 2022-04-10 PROCEDURE — 82607 VITAMIN B-12: CPT | Performed by: HOSPITALIST

## 2022-04-10 RX ORDER — CEFTRIAXONE 1 G/1
1 INJECTION, POWDER, FOR SOLUTION INTRAMUSCULAR; INTRAVENOUS ONCE
Status: COMPLETED | OUTPATIENT
Start: 2022-04-10 | End: 2022-04-10

## 2022-04-10 RX ADMIN — SODIUM CHLORIDE, POTASSIUM CHLORIDE, SODIUM LACTATE AND CALCIUM CHLORIDE 2244 ML: 600; 310; 30; 20 INJECTION, SOLUTION INTRAVENOUS at 21:13

## 2022-04-10 RX ADMIN — CEFTRIAXONE SODIUM 1 G: 1 INJECTION, POWDER, FOR SOLUTION INTRAMUSCULAR; INTRAVENOUS at 22:36

## 2022-04-10 ASSESSMENT — ACTIVITIES OF DAILY LIVING (ADL): ADLS_ACUITY_SCORE: 12

## 2022-04-11 LAB
ABO/RH TYPE: NORMAL
ALBUMIN SERPL-MCNC: 2.2 G/DL (ref 3.4–5)
ALP SERPL-CCNC: 78 U/L (ref 40–150)
ALT SERPL W P-5'-P-CCNC: 10 U/L (ref 0–70)
ANION GAP SERPL CALCULATED.3IONS-SCNC: 3 MMOL/L (ref 3–14)
ANTIBODY SCREEN: NEGATIVE
AST SERPL W P-5'-P-CCNC: 17 U/L (ref 0–45)
BASOPHILS # BLD AUTO: 0 10E3/UL (ref 0–0.2)
BASOPHILS NFR BLD AUTO: 0 %
BILIRUB SERPL-MCNC: 0.3 MG/DL (ref 0.2–1.3)
BLD PROD TYP BPU: NORMAL
BLOOD COMPONENT TYPE: NORMAL
BUN SERPL-MCNC: 27 MG/DL (ref 7–30)
CALCIUM SERPL-MCNC: 8 MG/DL (ref 8.5–10.1)
CHLORIDE BLD-SCNC: 111 MMOL/L (ref 94–109)
CO2 SERPL-SCNC: 25 MMOL/L (ref 20–32)
CODING SYSTEM: NORMAL
CREAT SERPL-MCNC: 1.28 MG/DL (ref 0.66–1.25)
CROSSMATCH: NORMAL
EOSINOPHIL # BLD AUTO: 0.6 10E3/UL (ref 0–0.7)
EOSINOPHIL NFR BLD AUTO: 5 %
ERYTHROCYTE [DISTWIDTH] IN BLOOD BY AUTOMATED COUNT: 14.2 % (ref 10–15)
GFR SERPL CREATININE-BSD FRML MDRD: 53 ML/MIN/1.73M2
GLUCOSE BLD-MCNC: 103 MG/DL (ref 70–99)
HCT VFR BLD AUTO: 23 % (ref 40–53)
HGB BLD-MCNC: 6.8 G/DL (ref 13.3–17.7)
IMM GRANULOCYTES # BLD: 0.1 10E3/UL
IMM GRANULOCYTES NFR BLD: 1 %
ISSUE DATE AND TIME: NORMAL
LYMPHOCYTES # BLD AUTO: 1 10E3/UL (ref 0.8–5.3)
LYMPHOCYTES NFR BLD AUTO: 9 %
MCH RBC QN AUTO: 26.9 PG (ref 26.5–33)
MCHC RBC AUTO-ENTMCNC: 29.6 G/DL (ref 31.5–36.5)
MCV RBC AUTO: 91 FL (ref 78–100)
MONOCYTES # BLD AUTO: 1.4 10E3/UL (ref 0–1.3)
MONOCYTES NFR BLD AUTO: 12 %
NEUTROPHILS # BLD AUTO: 8.5 10E3/UL (ref 1.6–8.3)
NEUTROPHILS NFR BLD AUTO: 73 %
NRBC # BLD AUTO: 0 10E3/UL
NRBC BLD AUTO-RTO: 0 /100
PLATELET # BLD AUTO: 329 10E3/UL (ref 150–450)
POTASSIUM BLD-SCNC: 4.2 MMOL/L (ref 3.4–5.3)
PROCALCITONIN SERPL-MCNC: 1.01 NG/ML
PROT SERPL-MCNC: 5.1 G/DL (ref 6.8–8.8)
RBC # BLD AUTO: 2.53 10E6/UL (ref 4.4–5.9)
SODIUM SERPL-SCNC: 139 MMOL/L (ref 133–144)
SPECIMEN EXPIRATION DATE: NORMAL
SPECIMEN EXPIRATION DATE: NORMAL
UNIT ABO/RH: NORMAL
UNIT NUMBER: NORMAL
UNIT STATUS: NORMAL
UNIT TYPE ISBT: 600
UPPER GI ENDOSCOPY: NORMAL
VIT B12 SERPL-MCNC: 431 PG/ML (ref 193–986)
WBC # BLD AUTO: 11.6 10E3/UL (ref 4–11)

## 2022-04-11 PROCEDURE — 94640 AIRWAY INHALATION TREATMENT: CPT | Mod: 76

## 2022-04-11 PROCEDURE — 250N000011 HC RX IP 250 OP 636: Performed by: INTERNAL MEDICINE

## 2022-04-11 PROCEDURE — 84145 PROCALCITONIN (PCT): CPT | Performed by: HOSPITALIST

## 2022-04-11 PROCEDURE — 250N000011 HC RX IP 250 OP 636: Performed by: HOSPITALIST

## 2022-04-11 PROCEDURE — 258N000003 HC RX IP 258 OP 636: Performed by: HOSPITALIST

## 2022-04-11 PROCEDURE — 96368 THER/DIAG CONCURRENT INF: CPT

## 2022-04-11 PROCEDURE — 43235 EGD DIAGNOSTIC BRUSH WASH: CPT | Performed by: INTERNAL MEDICINE

## 2022-04-11 PROCEDURE — 96366 THER/PROPH/DIAG IV INF ADDON: CPT

## 2022-04-11 PROCEDURE — 250N000013 HC RX MED GY IP 250 OP 250 PS 637: Performed by: INTERNAL MEDICINE

## 2022-04-11 PROCEDURE — 999N000099 HC STATISTIC MODERATE SEDATION < 10 MIN: Performed by: INTERNAL MEDICINE

## 2022-04-11 PROCEDURE — 86901 BLOOD TYPING SEROLOGIC RH(D): CPT | Performed by: INTERNAL MEDICINE

## 2022-04-11 PROCEDURE — 85025 COMPLETE CBC W/AUTO DIFF WBC: CPT | Performed by: HOSPITALIST

## 2022-04-11 PROCEDURE — 94640 AIRWAY INHALATION TREATMENT: CPT

## 2022-04-11 PROCEDURE — 96365 THER/PROPH/DIAG IV INF INIT: CPT

## 2022-04-11 PROCEDURE — 250N000009 HC RX 250: Performed by: HOSPITALIST

## 2022-04-11 PROCEDURE — P9016 RBC LEUKOCYTES REDUCED: HCPCS | Performed by: INTERNAL MEDICINE

## 2022-04-11 PROCEDURE — 120N000001 HC R&B MED SURG/OB

## 2022-04-11 PROCEDURE — 999N000157 HC STATISTIC RCP TIME EA 10 MIN

## 2022-04-11 PROCEDURE — 0DJ08ZZ INSPECTION OF UPPER INTESTINAL TRACT, VIA NATURAL OR ARTIFICIAL OPENING ENDOSCOPIC: ICD-10-PCS | Performed by: INTERNAL MEDICINE

## 2022-04-11 PROCEDURE — 99233 SBSQ HOSP IP/OBS HIGH 50: CPT | Performed by: INTERNAL MEDICINE

## 2022-04-11 PROCEDURE — 86923 COMPATIBILITY TEST ELECTRIC: CPT | Performed by: INTERNAL MEDICINE

## 2022-04-11 PROCEDURE — 36415 COLL VENOUS BLD VENIPUNCTURE: CPT | Performed by: HOSPITALIST

## 2022-04-11 PROCEDURE — 80053 COMPREHEN METABOLIC PANEL: CPT | Performed by: HOSPITALIST

## 2022-04-11 PROCEDURE — 86850 RBC ANTIBODY SCREEN: CPT | Performed by: INTERNAL MEDICINE

## 2022-04-11 PROCEDURE — 250N000009 HC RX 250: Performed by: INTERNAL MEDICINE

## 2022-04-11 RX ORDER — ACETAMINOPHEN 650 MG/1
650 SUPPOSITORY RECTAL EVERY 6 HOURS PRN
Status: DISCONTINUED | OUTPATIENT
Start: 2022-04-11 | End: 2022-04-13 | Stop reason: HOSPADM

## 2022-04-11 RX ORDER — FAMOTIDINE 20 MG/1
40 TABLET, FILM COATED ORAL 2 TIMES DAILY
Status: DISCONTINUED | OUTPATIENT
Start: 2022-04-12 | End: 2022-04-12

## 2022-04-11 RX ORDER — POLYETHYLENE GLYCOL 3350 17 G/17G
17 POWDER, FOR SOLUTION ORAL DAILY PRN
Status: DISCONTINUED | OUTPATIENT
Start: 2022-04-11 | End: 2022-04-13 | Stop reason: HOSPADM

## 2022-04-11 RX ORDER — ACETAMINOPHEN 325 MG/1
650 TABLET ORAL EVERY 6 HOURS PRN
Status: DISCONTINUED | OUTPATIENT
Start: 2022-04-11 | End: 2022-04-13 | Stop reason: HOSPADM

## 2022-04-11 RX ORDER — NALOXONE HYDROCHLORIDE 0.4 MG/ML
0.4 INJECTION, SOLUTION INTRAMUSCULAR; INTRAVENOUS; SUBCUTANEOUS
Status: DISCONTINUED | OUTPATIENT
Start: 2022-04-11 | End: 2022-04-13 | Stop reason: HOSPADM

## 2022-04-11 RX ORDER — GABAPENTIN 300 MG/1
600 CAPSULE ORAL AT BEDTIME
Status: DISCONTINUED | OUTPATIENT
Start: 2022-04-11 | End: 2022-04-13 | Stop reason: HOSPADM

## 2022-04-11 RX ORDER — BISACODYL 10 MG
10 SUPPOSITORY, RECTAL RECTAL DAILY PRN
Status: DISCONTINUED | OUTPATIENT
Start: 2022-04-11 | End: 2022-04-13 | Stop reason: HOSPADM

## 2022-04-11 RX ORDER — ATORVASTATIN CALCIUM 10 MG/1
10 TABLET, FILM COATED ORAL DAILY
Status: DISCONTINUED | OUTPATIENT
Start: 2022-04-11 | End: 2022-04-13 | Stop reason: HOSPADM

## 2022-04-11 RX ORDER — NALOXONE HYDROCHLORIDE 0.4 MG/ML
0.2 INJECTION, SOLUTION INTRAMUSCULAR; INTRAVENOUS; SUBCUTANEOUS
Status: DISCONTINUED | OUTPATIENT
Start: 2022-04-11 | End: 2022-04-13 | Stop reason: HOSPADM

## 2022-04-11 RX ORDER — ONDANSETRON 2 MG/ML
4 INJECTION INTRAMUSCULAR; INTRAVENOUS
Status: DISCONTINUED | OUTPATIENT
Start: 2022-04-11 | End: 2022-04-11 | Stop reason: HOSPADM

## 2022-04-11 RX ORDER — AZITHROMYCIN 500 MG/1
500 INJECTION, POWDER, LYOPHILIZED, FOR SOLUTION INTRAVENOUS EVERY 24 HOURS
Status: COMPLETED | OUTPATIENT
Start: 2022-04-11 | End: 2022-04-11

## 2022-04-11 RX ORDER — FENTANYL CITRATE 50 UG/ML
INJECTION, SOLUTION INTRAMUSCULAR; INTRAVENOUS PRN
Status: COMPLETED | OUTPATIENT
Start: 2022-04-11 | End: 2022-04-11

## 2022-04-11 RX ORDER — ASPIRIN 81 MG/1
81 TABLET, CHEWABLE ORAL DAILY
Status: ON HOLD | COMMUNITY
End: 2022-04-11

## 2022-04-11 RX ORDER — ALBUTEROL SULFATE 0.83 MG/ML
3 SOLUTION RESPIRATORY (INHALATION)
Status: DISCONTINUED | OUTPATIENT
Start: 2022-04-11 | End: 2022-04-13 | Stop reason: HOSPADM

## 2022-04-11 RX ORDER — DULOXETIN HYDROCHLORIDE 60 MG/1
60 CAPSULE, DELAYED RELEASE ORAL DAILY
Status: DISCONTINUED | OUTPATIENT
Start: 2022-04-11 | End: 2022-04-13 | Stop reason: HOSPADM

## 2022-04-11 RX ORDER — FLUMAZENIL 0.1 MG/ML
0.2 INJECTION, SOLUTION INTRAVENOUS
Status: ACTIVE | OUTPATIENT
Start: 2022-04-11 | End: 2022-04-12

## 2022-04-11 RX ORDER — FAMOTIDINE 20 MG/1
20 TABLET, FILM COATED ORAL DAILY
Status: ON HOLD | COMMUNITY
End: 2022-04-13

## 2022-04-11 RX ORDER — ALBUTEROL SULFATE 0.83 MG/ML
2.5 SOLUTION RESPIRATORY (INHALATION) EVERY 6 HOURS PRN
Status: DISCONTINUED | OUTPATIENT
Start: 2022-04-11 | End: 2022-04-11

## 2022-04-11 RX ORDER — ONDANSETRON 2 MG/ML
4 INJECTION INTRAMUSCULAR; INTRAVENOUS EVERY 6 HOURS PRN
Status: DISCONTINUED | OUTPATIENT
Start: 2022-04-11 | End: 2022-04-13 | Stop reason: HOSPADM

## 2022-04-11 RX ORDER — SODIUM CHLORIDE 9 MG/ML
INJECTION, SOLUTION INTRAVENOUS CONTINUOUS
Status: ACTIVE | OUTPATIENT
Start: 2022-04-11 | End: 2022-04-11

## 2022-04-11 RX ORDER — ALBUTEROL SULFATE 90 UG/1
1-2 AEROSOL, METERED RESPIRATORY (INHALATION) EVERY 6 HOURS PRN
Status: DISCONTINUED | OUTPATIENT
Start: 2022-04-11 | End: 2022-04-13 | Stop reason: HOSPADM

## 2022-04-11 RX ORDER — PROCHLORPERAZINE 25 MG
12.5 SUPPOSITORY, RECTAL RECTAL EVERY 12 HOURS PRN
Status: DISCONTINUED | OUTPATIENT
Start: 2022-04-11 | End: 2022-04-13 | Stop reason: HOSPADM

## 2022-04-11 RX ORDER — MAGNESIUM CARB/ALUMINUM HYDROX 105-160MG
296 TABLET,CHEWABLE ORAL ONCE
Status: COMPLETED | OUTPATIENT
Start: 2022-04-12 | End: 2022-04-12

## 2022-04-11 RX ORDER — GUAIFENESIN/DEXTROMETHORPHAN 100-10MG/5
10 SYRUP ORAL EVERY 4 HOURS PRN
Status: DISCONTINUED | OUTPATIENT
Start: 2022-04-11 | End: 2022-04-13 | Stop reason: HOSPADM

## 2022-04-11 RX ORDER — HYDROXYZINE PAMOATE 25 MG/1
50 CAPSULE ORAL EVERY 6 HOURS PRN
Status: DISCONTINUED | OUTPATIENT
Start: 2022-04-11 | End: 2022-04-13 | Stop reason: HOSPADM

## 2022-04-11 RX ORDER — POLYETHYLENE GLYCOL 3350 17 G/17G
238 POWDER, FOR SOLUTION ORAL ONCE
Status: COMPLETED | OUTPATIENT
Start: 2022-04-11 | End: 2022-04-11

## 2022-04-11 RX ORDER — PROCHLORPERAZINE MALEATE 5 MG
5 TABLET ORAL EVERY 6 HOURS PRN
Status: DISCONTINUED | OUTPATIENT
Start: 2022-04-11 | End: 2022-04-13 | Stop reason: HOSPADM

## 2022-04-11 RX ORDER — ONDANSETRON 4 MG/1
4 TABLET, ORALLY DISINTEGRATING ORAL EVERY 6 HOURS PRN
Status: DISCONTINUED | OUTPATIENT
Start: 2022-04-11 | End: 2022-04-13 | Stop reason: HOSPADM

## 2022-04-11 RX ORDER — IPRATROPIUM BROMIDE AND ALBUTEROL SULFATE 2.5; .5 MG/3ML; MG/3ML
3 SOLUTION RESPIRATORY (INHALATION)
Status: DISCONTINUED | OUTPATIENT
Start: 2022-04-11 | End: 2022-04-13 | Stop reason: HOSPADM

## 2022-04-11 RX ORDER — BISACODYL 5 MG
10 TABLET, DELAYED RELEASE (ENTERIC COATED) ORAL ONCE
Status: COMPLETED | OUTPATIENT
Start: 2022-04-11 | End: 2022-04-11

## 2022-04-11 RX ADMIN — GABAPENTIN 600 MG: 300 CAPSULE ORAL at 22:00

## 2022-04-11 RX ADMIN — CEFEPIME HYDROCHLORIDE 2 G: 2 INJECTION, POWDER, FOR SOLUTION INTRAVENOUS at 17:30

## 2022-04-11 RX ADMIN — IPRATROPIUM BROMIDE AND ALBUTEROL SULFATE 3 ML: .5; 3 SOLUTION RESPIRATORY (INHALATION) at 15:24

## 2022-04-11 RX ADMIN — IPRATROPIUM BROMIDE AND ALBUTEROL SULFATE 3 ML: .5; 3 SOLUTION RESPIRATORY (INHALATION) at 10:48

## 2022-04-11 RX ADMIN — BISACODYL 10 MG: 5 TABLET, COATED ORAL at 16:54

## 2022-04-11 RX ADMIN — DULOXETINE 60 MG: 60 CAPSULE, DELAYED RELEASE ORAL at 17:29

## 2022-04-11 RX ADMIN — MIDAZOLAM 2 MG: 1 INJECTION INTRAMUSCULAR; INTRAVENOUS at 14:26

## 2022-04-11 RX ADMIN — IPRATROPIUM BROMIDE AND ALBUTEROL SULFATE 3 ML: .5; 3 SOLUTION RESPIRATORY (INHALATION) at 07:18

## 2022-04-11 RX ADMIN — METOPROLOL SUCCINATE 12.5 MG: 25 TABLET, EXTENDED RELEASE ORAL at 17:29

## 2022-04-11 RX ADMIN — TOPICAL ANESTHETIC 1 SPRAY: 200 SPRAY DENTAL; PERIODONTAL at 14:25

## 2022-04-11 RX ADMIN — FENTANYL CITRATE 50 MCG: 50 INJECTION, SOLUTION INTRAMUSCULAR; INTRAVENOUS at 14:26

## 2022-04-11 RX ADMIN — AZITHROMYCIN MONOHYDRATE 500 MG: 500 INJECTION, POWDER, LYOPHILIZED, FOR SOLUTION INTRAVENOUS at 00:56

## 2022-04-11 RX ADMIN — ATORVASTATIN CALCIUM 10 MG: 10 TABLET, FILM COATED ORAL at 17:29

## 2022-04-11 RX ADMIN — SODIUM CHLORIDE: 9 INJECTION, SOLUTION INTRAVENOUS at 03:00

## 2022-04-11 RX ADMIN — Medication 238 G: at 22:00

## 2022-04-11 RX ADMIN — CEFEPIME HYDROCHLORIDE 2 G: 2 INJECTION, POWDER, FOR SOLUTION INTRAVENOUS at 02:14

## 2022-04-11 ASSESSMENT — ACTIVITIES OF DAILY LIVING (ADL)
ADLS_ACUITY_SCORE: 4
ADLS_ACUITY_SCORE: 13
ADLS_ACUITY_SCORE: 12
DIFFICULTY_EATING/SWALLOWING: NO
ADLS_ACUITY_SCORE: 13
ADLS_ACUITY_SCORE: 13
ADLS_ACUITY_SCORE: 5
ADLS_ACUITY_SCORE: 12
ADLS_ACUITY_SCORE: 13
ADLS_ACUITY_SCORE: 12
WALKING_OR_CLIMBING_STAIRS_DIFFICULTY: NO
ADLS_ACUITY_SCORE: 12
WEAR_GLASSES_OR_BLIND: YES
CHANGE_IN_FUNCTIONAL_STATUS_SINCE_ONSET_OF_CURRENT_ILLNESS/INJURY: NO
ADLS_ACUITY_SCORE: 12
ADLS_ACUITY_SCORE: 5
ADLS_ACUITY_SCORE: 5
DOING_ERRANDS_INDEPENDENTLY_DIFFICULTY: NO
ADLS_ACUITY_SCORE: 12
ADLS_ACUITY_SCORE: 13
FALL_HISTORY_WITHIN_LAST_SIX_MONTHS: YES
NUMBER_OF_TIMES_PATIENT_HAS_FALLEN_WITHIN_LAST_SIX_MONTHS: 1
ADLS_ACUITY_SCORE: 5
EQUIPMENT_CURRENTLY_USED_AT_HOME: CANE, STRAIGHT;WALKER, STANDARD
DRESSING/BATHING_DIFFICULTY: NO
ADLS_ACUITY_SCORE: 5
CONCENTRATING,_REMEMBERING_OR_MAKING_DECISIONS_DIFFICULTY: NO
ADLS_ACUITY_SCORE: 12
ADLS_ACUITY_SCORE: 12
TOILETING_ISSUES: NO
ADLS_ACUITY_SCORE: 13
ADLS_ACUITY_SCORE: 12
ADLS_ACUITY_SCORE: 12
ADLS_ACUITY_SCORE: 13
VISION_MANAGEMENT: GLASSES
ADLS_ACUITY_SCORE: 12

## 2022-04-11 NOTE — PROGRESS NOTES
RECEIVING UNIT ED HANDOFF REVIEW    ED Nurse Handoff Report was reviewed by: Tracey Myles RN on April 11, 2022 at 2:18 AM

## 2022-04-11 NOTE — ED PROVIDER NOTES
History     Chief Complaint:    Generalized Weakness and Altered Mental Status       HPI   Hermilo Velasquez is a 89 year old male who presents with his son and wife for increased weakness cough with sputum production and shakiness that been going on today.  They report he had some cough for couple of days lots of sputum production with the cough.  Today he was much more shaky, weak not ambulatory.  I informed me he was in the hospital about a month ago with pneumonia.  Patient complains of pain only in his right knee, reports it is chronic and that he needs to get a new knee.  No nausea no vomiting..    Allergies:  Lidocaine  Omeprazole  Pantoprazole  Prevacid [Lansoprazole]  Lasix [Furosemide]  Penicillins     Medications:    ACE/ARB/ARNI NOT PRESCRIBED (INTENTIONAL)  albuterol (PROAIR HFA/PROVENTIL HFA/VENTOLIN HFA) 108 (90 Base) MCG/ACT inhaler  albuterol (PROVENTIL) (2.5 MG/3ML) 0.083% neb solution  apixaban ANTICOAGULANT (ELIQUIS ANTICOAGULANT) 5 MG tablet  atorvastatin (LIPITOR) 10 MG tablet  clotrimazole (LOTRIMIN) 1 % external cream  DULoxetine (CYMBALTA) 30 MG capsule  famotidine (PEPCID) 40 MG tablet  ferrous sulfate (FE TABS) 325 (65 Fe) MG EC tablet  fluticasone-vilanterol (BREO ELLIPTA) 200-25 MCG/INH inhaler  gabapentin (NEURONTIN) 300 MG capsule  guaiFENesin (ROBITUSSIN) 20 mg/mL SOLN solution  hydrocortisone (CORTAID) 1 % external cream  hydrOXYzine (VISTARIL) 50 MG capsule  metoprolol succinate ER (TOPROL-XL) 25 MG 24 hr tablet  tiotropium (SPIRIVA HANDIHALER) 18 MCG inhaled capsule  triamcinolone (KENALOG) 0.1 % external cream        Past Medical History:    Past Medical History:   Diagnosis Date     Adenocarcinoma, lung, 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     Aortic stenosis      Atrial fibrillation (H)      COPD (chronic obstructive pulmonary disease) (H)      Deep vein thrombosis (H)      GERD (gastroesophageal reflux disease)      Heart murmur      Monoclonal gammopathy      Need for SBE (subacute  bacterial endocarditis) prophylaxis      Neuropathy      Other and unspecified hyperlipidemia      Other malignant lymphomas      RBBB (right bundle branch block)      Severe sepsis with acute organ dysfunction (H) 11/16/2015     Unspecified hereditary and idiopathic peripheral neuropathy        Patient Active Problem List    Diagnosis Date Noted     Acute on chronic diastolic congestive heart failure (H) 05/27/2021     Priority: Medium     History of pulmonary embolism 05/27/2021     Priority: Medium     Renal insufficiency 04/13/2021     Priority: Medium     Elevated troponin 04/13/2021     Priority: Medium     Pneumonia of left lower lobe due to infectious organism 04/13/2021     Priority: Medium     Gastrointestinal hemorrhage, unspecified gastrointestinal hemorrhage type 10/23/2020     Priority: Medium     Anemia, unspecified type 10/23/2020     Priority: Medium     CKD (chronic kidney disease) stage 3, GFR 30-59 ml/min 09/28/2020     Priority: Medium     FRED (acute kidney injury) (H) 09/14/2020     Priority: Medium     Gastric AVM's -- on EGD 8/17/20 09/14/2020     Priority: Medium     Anemia, iron deficiency 09/11/2020     Priority: Medium     History of non-Hodgkin's lymphoma 08/15/2020     Priority: Medium     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 08/15/2020     Priority: Medium     Feb 21, 2020 Entered By: FAHAD SALCIDO Comment: s/p RIGHT lobectomy in 3/2019       History of aortic valve replacement with tissue graft 08/15/2020     Priority: Medium     Feb 21, 2020 Entered By: FAHAD SALCIDO Comment: tissue valve in 2015       Centrilobular emphysema (H) 07/07/2020     Priority: Medium     On home oxygen 2L/min by NC       Malignant neoplasm of lung, unspecified laterality, unspecified part of lung (H) 01/13/2020     Priority: Medium     Nodule of lower lobe of right lung 03/26/2019     Priority: Medium     Spongiotic dermatitis 03/01/2019     Priority: Medium     Iron deficiency anemia due to  chronic blood loss 08/30/2018     Priority: Medium     Chronic obstructive pulmonary disease, unspecified COPD type (H) 03/26/2018     Priority: Medium     Hyperlipidemia LDL goal <130 01/10/2018     Priority: Medium     Bullous pemphigoid 05/22/2017     Priority: Medium     Essential hypertension with goal blood pressure less than 140/90 08/19/2016     Priority: Medium     Non-Hodgkin's lymphoma, unspecified body region, unspecified non-Hodgkin lymphoma type (H) 08/19/2016     Priority: Medium     Pulmonary nodules 06/10/2016     Priority: Medium     Need for SBE (subacute bacterial endocarditis) prophylaxis      Priority: Medium     Paroxysmal atrial fib -- on Eliquis  09/14/2015     Priority: Medium     Post-op 2015       Anemia due to blood loss, acute 09/11/2015     Priority: Medium     Heart murmur, systolic 01/23/2015     Priority: Medium     CARDIOVASCULAR SCREENING; LDL GOAL LESS THAN 130 05/17/2014     Priority: Medium     Health Care Home 08/21/2013     Priority: Medium     Tiana Beatty BS, RN, PHN  A Care Coordinator  [phone]                        Microscopic hematuria 08/19/2013     Priority: Medium     [facility] 08/2013       Non Hodgkin's lymphoma (H): 2013 06/10/2013     Priority: Medium     Dr. Bradley       Nonrheumatic aortic valve stenosis      Priority: Medium      9/2015 AVR - [facility] TRIFECTA Bovine bioprosthesis 25MM TF-25A         GERD (gastroesophageal reflux disease)      Priority: Medium        Past Surgical History:    Past Surgical History:   Procedure Laterality Date     APPENDECTOMY       AS TOTAL KNEE ARTHROPLASTY       BACK SURGERY       ESOPHAGOSCOPY, GASTROSCOPY, DUODENOSCOPY (EGD), COMBINED N/A 11/28/2015    Procedure: COMBINED ESOPHAGOSCOPY, GASTROSCOPY, DUODENOSCOPY (EGD);  Surgeon: Danis Castillo MD;  Location: [facility]     ESOPHAGOSCOPY, GASTROSCOPY, DUODENOSCOPY (EGD), COMBINED N/A 7/26/2018    Procedure: COMBINED ESOPHAGOSCOPY,  GASTROSCOPY, DUODENOSCOPY (EGD);;  Surgeon: Toby Dong DO;  Location:  GI     ESOPHAGOSCOPY, GASTROSCOPY, DUODENOSCOPY (EGD), COMBINED N/A 8/17/2020    Procedure: ESOPHAGOGASTRODUODENOSCOPY (EGD);  Surgeon: Herrera Henry MD;  Location:  GI     ESOPHAGOSCOPY, GASTROSCOPY, DUODENOSCOPY (EGD), COMBINED N/A 10/24/2020    Procedure: ESOPHAGOGASTRODUODENOSCOPY (EGD);  Surgeon: Vick Florentino MD;  Location:  GI     HERNIA REPAIR  2006     HERNIORRHAPHY VENTRAL  4/17/2013    Procedure: HERNIORRHAPHY VENTRAL;  VENTRAL HERNIA REPAIR WITH MESH;  Surgeon: Patel Guzman MD;  Location:  OR     KNEE SURGERY      arthroscopic right knee surgery      LOBECTOMY LUNG Right 3/26/2019    POSSIBLE LOBECTOMY LUNG per Dr. Vasques at [facility]     REPAIR LIGAMENT ANKLE  2/23/2012    Procedure:REPAIR LIGAMENT ANKLE; LEFT TARSAL TUNNEL RELEASE OF KNOT OF ARIEL RELEASE; Surgeon:SAUL PUENTE; Location: OR     REPLACE VALVE AORTIC N/A 9/3/2015    Procedure: REPLACE VALVE AORTIC;  Surgeon: Antonino Mitchell MD;  Location:  OR     ROTATOR CUFF REPAIR RT/LT      bilateral     SPINE SURGERY      3 spine surgeries     THORACOTOMY, WEDGE RESECTION LUNG, COMBINED Right 3/26/2019    RIGHT THORACOTOMY, WEDGE RESECTION, RIGHT LOWER LOBE LUNG NODULE,;  Surgeon: Jose A Vasques MD;  Location:  OR     TONSILLECTOMY       TURP          Family History:    family history includes C.A.D. in his father; Emphysema in his father.    Social History:   reports that he quit smoking about 24 years ago. He has a 110.00 pack-year smoking history. He has never used smokeless tobacco. He reports current alcohol use. He reports that he does not use drugs.    PCP: Karson Bishop     Review of Systems   All other systems reviewed and are negative.        Physical Exam     Patient Vitals for the past 24 hrs:   BP Temp Temp src Pulse Resp SpO2 Height Weight   04/10/22 2130 115/55 -- -- 80 18 100 % -- --  "  04/10/22 2120 110/51 -- -- 80 18 100 % -- --   04/10/22 2110 109/51 -- -- 80 18 100 % -- --   04/10/22 2100 107/48 -- -- 80 20 100 % -- --   04/10/22 2050 103/48 -- -- 80 17 100 % -- --   04/10/22 2040 102/48 -- -- 88 19 90 % -- --   04/10/22 2030 95/46 -- -- 88 -- 100 % -- --   04/10/22 2020 95/47 -- -- 86 -- 100 % -- --   04/10/22 2010 (!) 85/50 -- -- -- -- -- -- --   04/10/22 2003 (!) 73/35 98.1  F (36.7  C) Temporal 100 18 92 % 1.778 m (5' 10\") 74.8 kg (165 lb)        Physical Exam  Gen: Appears stated age, appears weak, responds weakly to questions  Oral : Mucous membranes moist,   Nose: No rhinorhea  Ears: External near normal, without drainage  Eyes: periorbital tissues and sclera normal   Neck: supple, no abnormal swelling  Lungs:  CTAB,  no resp distress, speaks full sentences  CV: Mildly tachycardic  Abd: soft, nontender, nondistended, no rebound/guarding  Ext: no lower extremity edema  Skin: warm, dry, well perfused, no rashes/bruising/lesions on exposed skin  Neuro: alert, no gross motor or sensory deficits,   Psych: pleasant mood, normal affect    Emergency Department Course     ECG (  Normal sinus rate 87, right bundle branch block, , QTc 473, no ST segment changes or T inversion concerning for acute ischemia, similar to previous EKG    Imaging:    CT Chest Abdomen Pelvis w/o Contrast   Preliminary Result   IMPRESSION:   1.  Small right pleural effusion.   2.  Bilateral discoid atelectasis, correlate to exclude superimposed infection.   3.  Moderate emphysema.   4.  Mildly enlarged heart with aortic valve replacement and coronary artery disease and atherosclerotic vascular disease.   5.  3.5 cm saccular infrarenal abdominal aortic aneurysm.   6.  No obstruction, colitis, diverticulitis, or appendicitis.   7.  No obstructing renal or ureteral stones. No hydroureteronephrosis.      XR Chest Port 1 View   Final Result   IMPRESSION: Sternotomy. AVR. CABG. Markedly shallow inspiration exaggerates " heart size which is within normal limits and unchanged. Pulmonary vascularity within normal limits. Strands of fibrosis in the lower lungs unchanged. Lungs otherwise clear. No    visible pleural fluid. Calcified tortuous thoracic aorta. Bones are demineralized.           Laboratory:    Labs Ordered and Resulted from Time of ED Arrival to Time of ED Departure   COMPREHENSIVE METABOLIC PANEL - Abnormal       Result Value    Sodium 138      Potassium 4.5      Chloride 107      Carbon Dioxide (CO2) 26      Anion Gap 5      Urea Nitrogen 29      Creatinine 1.59 (*)     Calcium 8.4 (*)     Glucose 134 (*)     Alkaline Phosphatase 91      AST 11      ALT 10      Protein Total 5.9 (*)     Albumin 2.6 (*)     Bilirubin Total 0.5      GFR Estimate 41 (*)    ROUTINE UA WITH MICROSCOPIC REFLEX TO CULTURE - Abnormal    Color Urine Yellow      Appearance Urine Clear      Glucose Urine Negative      Bilirubin Urine Negative      Ketones Urine Negative      Specific Gravity Urine 1.021      Blood Urine Large (*)     pH Urine 5.5      Protein Albumin Urine 30  (*)     Urobilinogen Urine Normal      Nitrite Urine Negative      Leukocyte Esterase Urine Negative      Mucus Urine Present (*)     RBC Urine >182 (*)     WBC Urine 13 (*)     Hyaline Casts Urine 3 (*)    CBC WITH PLATELETS AND DIFFERENTIAL - Abnormal    WBC Count 17.1 (*)     RBC Count 2.95 (*)     Hemoglobin 8.0 (*)     Hematocrit 26.7 (*)     MCV 91      MCH 27.1      MCHC 30.0 (*)     RDW 14.1      Platelet Count 401      % Neutrophils 82      % Lymphocytes 5      % Monocytes 9      % Eosinophils 3      % Basophils 0      % Immature Granulocytes 1      NRBCs per 100 WBC 0      Absolute Neutrophils 14.0 (*)     Absolute Lymphocytes 0.9      Absolute Monocytes 1.6 (*)     Absolute Eosinophils 0.5      Absolute Basophils 0.0      Absolute Immature Granulocytes 0.1      Absolute NRBCs 0.0     ISTAT GASES LACTATE VENOUS POCT - Abnormal    Lactic Acid POCT 2.5 (*)      Bicarbonate Venous POCT 26      O2 Sat, Venous POCT 65 (*)     pCO2V Venous POCT 46      pH Venous POCT 7.36      pO2 Venous POCT 36     INFLUENZA A/B & SARS-COV2 PCR MULTIPLEX - Normal    Influenza A PCR Negative      Influenza B PCR Negative      RSV PCR Negative      SARS CoV2 PCR Negative     LACTIC ACID WHOLE BLOOD - Normal    Lactic Acid 1.1     ISTAT GASES LACTATE VENOUS POCT   BLOOD CULTURE   BLOOD CULTURE   URINE CULTURE        Procedures:    Interventions:    Medications   sodium chloride (PF) 0.9% PF flush 3 mL (has no administration in time range)   sodium chloride (PF) 0.9% PF flush 3 mL (has no administration in time range)   lactated ringers BOLUS 2,244 mL (2,244 mLs Intravenous New Bag 4/10/22 2113)   cefTRIAXone (ROCEPHIN) 1 g vial to attach to  mL bag for ADULTS or NS 50 mL bag for PEDS (1 g Intravenous New Bag 4/10/22 2236)        Emergency Department Course:  Past medical records, nursing notes, and vitals reviewed.  I performed an exam of the patient and obtained history, as documented above.    I rechecked the patient. Findings and plan explained to the Patient and family    Impression & Plan          CMS Diagnoses:   The patient has signs of Severe Sepsis        If one the following conditions is present, a 30 mL/kg bolus is recommended as part of the 6 hour bundle (IBW can be used for BMI >30, or document refusal/contraindication):      1.   Initial hypotension  defined as 2 bps < 90 or map < 65 in the 6hrs before or 3hrs after time zero.     2.  Lactate >4.      The patient has signs of Severe Sepsis as evidenced by:    1. 2 SIRS criteria, AND  2. Suspected infection, AND   3. Organ dysfunction:  SBP <90, MAP < 65, or SBP decrease of >40 from baseline due to infection and Lactic Acidosis with value >2.0    Time severe sepsis diagnosis confirmed:   04/10/22 as this was the time when Lactate resulted, and the level was > 2.0    3 Hour Severe Sepsis Bundle Completion:    1. Initial Lactic  Acid Result:   Recent Labs   Lab Test 04/10/22  2236 04/10/22  2033 04/13/21  2221   LACT 1.1 2.5* 1.1     2. Blood Cultures before Antibiotics: Yes  3. Broad Spectrum Antibiotics Administered:  yes       Anti-infectives (From admission through now)    Start     Dose/Rate Route Frequency Ordered Stop    04/10/22 2150  cefTRIAXone (ROCEPHIN) 1 g vial to attach to  mL bag for ADULTS or NS 50 mL bag for PEDS         1 g  over 15-30 Minutes Intravenous ONCE 04/10/22 2146 04/10/22 2251          4. Is initial hypotension present?     Yes. (Definition - 2 SBPs <90, MAP <65, or decrease > 40 from baseline due to infection w/in 3 hrs of each other during the time period of 6 hrs before and 3 hrs after time zero)   Full 30 mL/kg bolus given (see amount below).    BMI Readings from Last 1 Encounters:   04/10/22 23.68 kg/m      30 mL/kg fluids based on weight: 2,240 mL  30 mL/kg fluids based on IBW (must be >= 60 inches tall): 2,190 mL                      Severe Sepsis reassessment:  1. Repeat Lactic Acid Level within 6 hours of time zero:   2. MAP>65 after initial IVF bolus, will continue to monitor fluid status and vital signs          Medical Decision Making:    Patient presents emergency department with hypotension weakness, initially was little hypoxic but I think that is because he is weak and not taking deep breaths.  I had concerns for sepsis immediately.  Blood cultures IV access were obtained, started with a 30 mL/kg fluid bolus of lactated Ringer's and fortunately patient's blood pressure began responding.  He has significant leukocytosis, family was reporting cough and sputum production but chest x-ray seems clear.  Will order CT chest abdomen pelvis to evaluate for sepsis pathology.  May have some FRED as well with creatinine little bit elevated.  Lactate came back moderately elevated meeting criteria for severe sepsis.  Repeat lactate after fluid bolus complete is normal.  Patient given ceftriaxone to cover  for sepsis initially.  Contacted hospitalist in agreement for inpatient management.         Critical Care time:  was 35 minutes for this patient excluding procedures.    Diagnosis:    ICD-10-CM    1. Severe sepsis (H)  A41.9     R65.20         Discharge Medications:  New Prescriptions    No medications on file        4/10/2022   En Jean,*        En Jean MD  04/10/22 8436

## 2022-04-11 NOTE — ED TRIAGE NOTES
"Patient here with wife and family member. Wife reports patient has been very sleepy today, and has been \"saying some really funny things\" all day. Patient is also very weak and having difficulty standing, more than at baseline. Wife reports patient was \"more alert, normal\" yesterday.   "

## 2022-04-11 NOTE — CONSULTS
CLINICAL NUTRITION SERVICES  -  ASSESSMENT NOTE      Future/Additional Recommendations:   ADAT post procedure   Nutrition will follow and provide ONS as indicated pending PO intakes this admission   Malnutrition:   % Weight Loss:  None noted  % Intake:  No decreased intake noted  Subcutaneous Fat Loss:  None acutely observed  Muscle Loss:  None acutely observed, some mild age-related loss not consistent with malnutrition   Fluid Retention:  None noted    Malnutrition Diagnosis: Patient does not meet two of the above criteria necessary for diagnosing malnutrition       REASON FOR ASSESSMENT  Hermilo Velasquez is a 89 year old male seen by Registered Dietitian for Provider Order - Suspected severe protein calorie malnutrition    Per chart review, PMH significant for Stage 1 lung adenocarcinoma s/p right lower lobe wedge resection in 2019, COPD, Non-Hodgkin's lymphoma, a fib, aortic stenosis and GERD.  Admitted 2/2 severe sepsis due to right upper lobe and lingular pneumonia and acute anemia.        NUTRITION HISTORY  - Information obtained from patient at bedside this morning and chart review.   - Patient was assessed by dietitian 2 years ago in March 2020. At this time it was noted patient eats well at baseline with 3 meals/day. Lives with spouse who does the meal preparation/cooking. Weight was running ~180#.   - Patient tells me this morning that his appetite has been good, denies any recent weight changes and feels if anything he has gained a couple pounds. Notes that he still eats 3 meals/day and lives with his spouse who cooks for him.   - No known food allergies noted.       CURRENT NUTRITION ORDERS  Diet Order:     NPO       NUTRITION FOCUSED PHYSICAL ASSESSMENT FOR DIAGNOSING MALNUTRITION)  Yes         Observed:    Some mild age-related loss in orbital and clavicle region, not consistent with malnutrition    Obtained from Chart/Interdisciplinary Team:  Plan for EGD today per GI and colonoscopy tomorrow if  "negative to rule out GI bleed as source of anemia     ANTHROPOMETRICS  Height: 5' 10\"  Weight: 165 lbs 0 oz  Body mass index is 23.68 kg/m .  Weight Status:  Normal BMI  Weight History: Weight has been stable over the past year, minor fluctuations noted.   Wt Readings from Last 10 Encounters:   04/10/22 74.8 kg (165 lb)   03/10/22 76.7 kg (169 lb)   01/13/22 75.3 kg (166 lb)   11/30/21 73.5 kg (162 lb)   09/28/21 70.6 kg (155 lb 9.6 oz)   08/16/21 71.7 kg (158 lb)   07/27/21 71.2 kg (157 lb)   07/14/21 71.7 kg (158 lb)   05/27/21 73.9 kg (163 lb)   04/23/21 76.2 kg (168 lb)       LABS  Labs reviewed  - Albumin 2.2 (L) --> NOT a reliable indicator of nutritional status. Albumin is a negative acute phase reactant that decreases during periods of stress, inflammation and/or infection.   - WBC 11.6 (H) --> elevation may indicate inflammation and/or infectious process that can contribute to decreased albumin     MEDICATIONS  Medications reviewed  - NaCl 0.9% IVF at 100 mL/hr       ASSESSED NUTRITION NEEDS PER APPROVED PRACTICE GUIDELINES:    Dosing Weight 75 kg (4/10)  Estimated Energy Needs: 0508-6152 kcals (25-30 Kcal/Kg)  Justification: maintenance  Estimated Protein Needs:  grams protein (1.2-1.5 g pro/Kg)  Justification: hypercatabolism with acute illness  Estimated Fluid Needs: 6605-8977  mL (1 mL/Kcal)  Justification: maintenance or per provider pending fluid status      NUTRITION DIAGNOSIS:  Inadequate protein-energy intake related to NPO status x 24 hours as evidenced by currently meeting 0% energy and protein needs       NUTRITION INTERVENTIONS  Recommendations / Nutrition Prescription  ADAT post procedure   Nutrition will follow and provide ONS as indicated pending PO intakes this admission       Implementation  Nutrition education: Provided education on RD and role of care. Encouraged that we will follow and can provide protein supplements as needed pending PO intakes.       Nutrition Goals  Diet " advance vs nutrition support in the next 48 hours       MONITORING AND EVALUATION:  Progress towards goals will be monitored and evaluated per protocol and Practice Guidelines      Dana Olson RD, LD

## 2022-04-11 NOTE — ED NOTES
"[facility]  ED Nurse Handoff Report    ED Chief complaint: Generalized Weakness and Altered Mental Status      ED Diagnosis:   Final diagnoses:   Severe sepsis (H)       Code Status: Full Code    Allergies:   Allergies   Allergen Reactions     Lidocaine Blisters     Allergy to lidocaine ointment     Omeprazole Itching     Pantoprazole Itching     Prevacid [Lansoprazole] Itching     Lasix [Furosemide] Rash     Penicillins Rash     \"broke out from injection\" 60 yrs ago         Patient Story: pt coming in with weakness and AMS  Focused Assessment:  Pt is weak and recently was in the hospital with pneumonia. He returns today weak, hypotensive and low O2; with fluids and NC (currently at 1L) Pt is A&Ox4 but sleepy    Treatments and/or interventions provided: abx, fluids, NC   Patient's response to treatments and/or interventions: well, VSS    To be done/followed up on inpatient unit:  monitor     Does this patient have any cognitive concerns?: Forgetful    Activity level - Baseline/Home:  Cane  Activity Level - Current:   Stand with assist x2 and Cane    Patient's Preferred language: English   Needed?: No    Isolation: None  Infection: alert    Patient tested for COVID 19 prior to admission: YES  Bariatric?: No    Vital Signs:   Vitals:    04/10/22 2100 04/10/22 2110 04/10/22 2120 04/10/22 2130   BP: 107/48 109/51 110/51 115/55   Pulse: 80 80 80 80   Resp: 20 18 18 18   Temp:       TempSrc:       SpO2: 100% 100% 100% 100%   Weight:       Height:           Cardiac Rhythm:     Was the PSS-3 completed:   Yes  What interventions are required if any?               Family Comments: wife   OBS brochure/video discussed/provided to patient/family: No              Name of person given brochure if not patient:               Relationship to patient:     For the majority of the shift this patient's behavior was Green.   Behavioral interventions performed were .    ED NURSE PHONE NUMBER: *[phone]         "

## 2022-04-11 NOTE — PROVIDER NOTIFICATION
Paged Dr. Malagon to fill out a new consent form for blood for patient as consent form that was filled out on overnights was for the wrong patient. Will get blood and transfuse once consent form is signed.

## 2022-04-11 NOTE — PROGRESS NOTES
Did not complete orthostatic BP's as patients hemoglobin was low and RBC were transfused on shift. Pt stayed in bed.

## 2022-04-11 NOTE — PHARMACY-ADMISSION MEDICATION HISTORY
Pharmacy Medication History  Admission medication history interview status for the 4/10/2022  admission is complete. See EPIC admission navigator for prior to admission medications     Location of Interview: Phone  Medication history sources: Patient    Significant changes made to the medication list:  Added Eliquis,Breo-ellipta;  Deleted Aspirin, Spriva    In the past week, patient estimated taking medication this percent of the time: greater than 90%    Additional medication history information:   None    Medication reconciliation completed by provider prior to medication history? No    Time spent in this activity: 30 min    Prior to Admission medications    Medication Sig Last Dose Taking? Auth Provider   albuterol (PROAIR HFA/PROVENTIL HFA/VENTOLIN HFA) 108 (90 Base) MCG/ACT inhaler Inhale 1-2 puffs into the lungs every 6 hours as needed for shortness of breath / dyspnea or wheezing  Patient taking differently: Inhale 2 puffs into the lungs every 6 hours as needed for shortness of breath / dyspnea or wheezing  Yes Karson Bishop MD   albuterol (PROVENTIL) (2.5 MG/3ML) 0.083% neb solution Take 1 vial (2.5 mg) by nebulization every 6 hours as needed for shortness of breath / dyspnea or wheezing  Yes Karson Bishop MD   apixaban ANTICOAGULANT (ELIQUIS) 5 MG tablet Take 5 mg by mouth in the morning and 5 mg in the evening.  Yes Unknown, Entered By History   atorvastatin (LIPITOR) 10 MG tablet Take 1 tablet (10 mg) by mouth daily 4/10/2022 at Unknown time Yes Karson Bishop MD   DULoxetine (CYMBALTA) 60 MG capsule Take 60 mg by mouth daily  Yes Unknown, Entered By History   famotidine (PEPCID) 20 MG tablet Take 20 mg by mouth in the morning. 4/10/2022 at Unknown time Yes Unknown, Entered By History   ferrous sulfate (FE TABS) 325 (65 Fe) MG EC tablet Take 1 tablet (325 mg) by mouth daily 4/10/2022 at Unknown time Yes Karson Bishop MD   gabapentin (NEURONTIN) 300 MG capsule Take 600 mg by mouth At Bedtime  4/10/2022 at Unknown time Yes Unknown, Entered By History   metoprolol succinate ER (TOPROL-XL) 25 MG 24 hr tablet Take 0.5 tablets (12.5 mg) by mouth daily 4/10/2022 at Unknown time Yes Karson Bishop MD   breo-ellipta 200-25 1 puff daily  yes Karson Bishop MD   triamcinolone (KENALOG) 0.1 % external cream Apply topically 2 times daily as needed for irritation  Yes Karson Bishop MD   ACE/ARB/ARNI NOT PRESCRIBED (INTENTIONAL) Please choose reason not prescribed from choices below.   Liv Mc MD       The information provided in this note is only as accurate as the sources available at the time of update(s)

## 2022-04-11 NOTE — CONSULTS
[facility]  Gastroenterology Consultation         Hermilo Velasquez  [address] [id number]  89 year old male    Admission Date/Time: 4/10/2022  Primary Care Provider: Karson Bishop  Referring / Attending Physician:  Nadeem Hernandez MD    We were asked to see the patient in consultation by Dr. Nadeem Hernandez for evaluation of acute anemia.      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 He was admitted with productive cought and weakness and suspected severe sepsis due to right upper lobe lingular pneumonia as well as anemia.    He was admitted with SBP of 75 and now improved to 100 Hemoglobin baseline is 12.2 and has decrease to 8.0 and now 6.8. He has had no signs of active bleeding. Previous EGD in 08/2020 noted non bleeding angioectasias. He had a colonoscopy in 10/2020 noted sex recently bleeding colonic angiodysplastic lesions- treated with APC. One 10 mm polyp removed in sigmoid colon.     Patient has been started on antibiotics for pneumonia. He remains on 2 LPM oxygen.    ROS: A comprehensive ten point review of systems was negative aside from those in mentioned in the HPI.      PAST MED HX:  I have reviewed this patient's medical history and updated it with pertinent information if needed.   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 Vasques      Aortic stenosis     Severe AS, 9/2015 AVR - [facility] TRIFECTA Bovine bioprosthesis 25MM [id number]     Atrial fibrillation (H)     9/2015 Paroxysmal post op Afib - discharged on Warfarin and a beta  blocker     COPD (chronic obstructive pulmonary disease) (H)      Deep vein thrombosis (H)      GERD (gastroesophageal reflux disease)      Heart murmur      Monoclonal gammopathy     plasmacyte prominent causing monoclonal gammopathy     Need for SBE (subacute bacterial endocarditis) prophylaxis      Neuropathy      Other and unspecified hyperlipidemia      Other malignant lymphomas     non hodgkin's lymphoma     RBBB (right bundle branch block)      Severe sepsis with acute organ dysfunction (H) 11/16/2015     Unspecified hereditary and idiopathic peripheral neuropathy        MEDICATIONS:   Prior to Admission Medications   Prescriptions Last Dose Informant Patient Reported? Taking?   ACE/ARB/ARNI NOT PRESCRIBED (INTENTIONAL)   No No   Sig: Please choose reason not prescribed from choices below.   DULoxetine (CYMBALTA) 30 MG capsule   Yes No   Sig: Take 60 mg by mouth daily   albuterol (PROAIR HFA/PROVENTIL HFA/VENTOLIN HFA) 108 (90 Base) MCG/ACT inhaler  Self No No   Sig: Inhale 1-2 puffs into the lungs every 6 hours as needed for shortness of breath / dyspnea or wheezing   albuterol (PROVENTIL) (2.5 MG/3ML) 0.083% neb solution   No No   Sig: Take 1 vial (2.5 mg) by nebulization every 6 hours as needed for shortness of breath / dyspnea or wheezing   apixaban ANTICOAGULANT (ELIQUIS ANTICOAGULANT) 5 MG tablet   No No   Sig: Take 1 tablet (5 mg) by mouth 2 times daily   atorvastatin (LIPITOR) 10 MG tablet   No No   Sig: Take 1 tablet (10 mg) by mouth daily   clotrimazole (LOTRIMIN) 1 % external cream   No No   Sig: Apply topically 2 times daily   famotidine (PEPCID) 40 MG tablet   No No   Sig: Take 1 tablet (40 mg) by mouth 2 times daily   ferrous sulfate (FE TABS) 325 (65 Fe) MG EC tablet  Self No No   Sig: Take 1 tablet (325 mg) by mouth daily   fluticasone-vilanterol (BREO ELLIPTA) 200-25 MCG/INH inhaler   No No   Sig: Inhale 1 puff into the lungs daily   gabapentin (NEURONTIN) 300 MG capsule   Yes No   Sig: Take  "600 mg by mouth At Bedtime   guaiFENesin (ROBITUSSIN) 20 mg/mL SOLN solution   No No   Sig: Take 10 mLs by mouth every 4 hours as needed for other (secretion expectorant)   hydrOXYzine (VISTARIL) 50 MG capsule  Self Yes No   Sig: Take 50 mg by mouth every 6 hours as needed for itching   hydrocortisone (CORTAID) 1 % external cream   No No   Sig: Apply topically 2 times daily   metoprolol succinate ER (TOPROL-XL) 25 MG 24 hr tablet  Self No No   Sig: Take 0.5 tablets (12.5 mg) by mouth daily   tiotropium (SPIRIVA HANDIHALER) 18 MCG inhaled capsule  Self No No   Sig: Inhale 1 capsule (18 mcg) into the lungs daily   triamcinolone (KENALOG) 0.1 % external cream  Self No No   Sig: Apply topically 2 times daily as needed for irritation      Facility-Administered Medications: None       ALLERGIES:   Allergies   Allergen Reactions     Lidocaine Blisters     Allergy to lidocaine ointment     Omeprazole Itching     Pantoprazole Itching     Prevacid [Lansoprazole] Itching     Lasix [Furosemide] Rash     Penicillins Rash     \"broke out from injection\" 60 yrs ago  Tolerates cephalosporins       SOCIAL HISTORY:  Social History     Tobacco Use     Smoking status: Former Smoker     Packs/day: 2.00     Years: 55.00     Pack years: 110.00     Quit date: 1998     Years since quittin.2     Smokeless tobacco: Never Used   Substance Use Topics     Alcohol use: Yes     Alcohol/week: 0.0 standard drinks     Comment: 1 drink per day     Drug use: No       FAMILY HISTORY:  Family History   Problem Relation Age of Onset     C.A.D. Father      Emphysema Father      Melanoma No family hx of      Skin Cancer No family hx of        PHYSICAL EXAM:   General  Alert, oriented x2, comfortable  Vital Signs with Ranges  Temp: 97.8  F (36.6  C) Temp src: Oral BP: 103/52 Pulse: 90   Resp: 20 SpO2: 93 % O2 Device: None (Room air) Oxygen Delivery: 2 LPM  I/O last 3 completed shifts:  In:  [IV Piggyback:]  Out: -     Constitutional: alert " and no distress   Cardiovascular: negative, PMI normal. No lifts, heaves, or thrills. RRR. No murmurs, clicks gallops or rub  Respiratory: negative, Percussion normal. Good diaphragmatic excursion. Lungs clear  Abdomen: Abdomen soft, non-tender. BS normal. No masses, organomegaly          ADDITIONAL COMMENTS:   I reviewed the patient's new clinical lab test results.   Recent Labs   Lab Test 04/11/22  0744 04/10/22  2034 01/13/22  1154 11/30/21  1123 09/28/21  1456 10/24/20  0037 10/23/20  1617 08/18/20  0829 08/17/20  0948 08/16/20  0829   WBC 11.6* 17.1*  --   --  6.6   < > 8.0   < > 8.9 9.5   HGB 6.8* 8.0* 12.2*   < > 12.0*   < > 6.6*   < > 7.7* 7.5*   MCV 91 91  --   --  89   < > 89   < > 82 81    401  --   --  194   < > 279   < > 273 272   INR  --   --   --   --   --   --  1.39*  --  1.21* 1.30*    < > = values in this interval not displayed.     Recent Labs   Lab Test 04/11/22  0744 04/10/22  2034 09/08/21  1459   POTASSIUM 4.2 4.5 4.0   CHLORIDE 111* 107 107   CO2 25 26 29   BUN 27 29 25   ANIONGAP 3 5 <1*     Recent Labs   Lab Test 04/11/22  0744 04/10/22  2236 04/10/22  2034 09/08/21  1459 08/16/20  0829 08/16/20  0400 03/23/20  0459 03/22/20  1950 03/22/20  1914   ALBUMIN 2.2*  --  2.6* 3.1*   < >  --    < >  --  2.6*   BILITOTAL 0.3  --  0.5 0.6   < >  --    < >  --  0.6   ALT 10  --  10 12   < >  --    < >  --  27   AST 17  --  11 10   < >  --    < >  --  101*   PROTEIN  --  30 *  --   --   --  Negative  --  10*  --    LIPASE  --   --   --   --   --   --   --   --  54*    < > = values in this interval not displayed.       I reviewed the patient's new imaging results.        CONSULTATION ASSESSMENT AND PLAN:    Hermilo Olson is a 89 year old male with acute on chronic anemia, admitted on 4/10 with sepsis likely due to pneumonia with anemia.     Acute on chronic anemia  Prior GI bleed due to AVMs  Chronically anticoagulated on Eliquis  Hemoglobin baseline is 12.2 and has decrease to 8.0 and now  6.8.   He has had no signs of active bleeding.   Previous EGD in 08/2020 noted non bleeding angioectasias.   He had a colonoscopy in 10/2020 noted sex recently bleeding colonic angiodysplastic lesions- treated with APC. One 10 mm polyp removed in sigmoid colon.   Has been anticoagulated on apixiban and currently held  Cause of anemia may be multifactorial, being treated for sepsis from pneumonia vs slow upper or lower GI bleed  I have discussed with patients spouse, Roberta, labs findings and recommendations for EGD for anemia    -- Keep NPO  -- EGD today  -- would IV pantoprazole 40 mg BID but allergic per records  -- Serial hemoglobin and transfuse for hemoglobin of 7 or less  -- Hold apixiban  -- Consider colonoscopy tomorrow if negative EGD today          [facility].  Office: [phone]  Cell : [phone] (Dr. Florentino)  Cell: [phone] (Sammie Cox PA-C)

## 2022-04-11 NOTE — H&P
[facility]    History and Physical  Hospitalist       Date of Admission:  4/10/2022  Date of Service (when I saw the patient): 04/10/22    ASSESSMENT  Hermilo Velasquez is a markedly pleasant 89 year old gentleman former smoker with extensive past medical history that is most notable for chronic respiratory failure due to COPD, aortic stenosis status post AVR and bioprosthetic valve replacement, chronic paroxysmal atrial fibrillation and recurrent PE on Apixaban, prior lung cancer, and prior Non-Hodgkins Lymphoma, among multiple others; who presents with productive cough and weakness and is found to have suspected severe sepsis due to right upper lobe and lingular pneumonia, as well as acute anemia.    PLAN    Severe sepsis due to right upper lobe and lingular pneumonia: We note that he has a history of Stage 1 lung adenocarcinoma status post right lower lobe wedge resection in 2019. He also has COPD and uses oxygen continuously 2 liters by nasal cannula at home. He has had multiple prior episodes of pneumonia. Most recently he was hospitalized here 4/2021 for severe sepsis due to left lower lobe pneumonia, treated successfully with cephalosporin and azithromycin. Now he presents with acute productive cough with generalized weakness. In the ED he is found to be hypotensive and hypoxic as low as 85% on room air. He has leukocytosis and elevated Lactate. CXR and CT of chest, abdomen and pelvis are most notable for pulmonary opacifications in the right upper lobe bordering the lingula. It also showed a small right sided pleural effusion, changes consistent with emphysema, and suspected lung scarring. Overall, we suspect severe sepsis due to mult-lobar pneumonia. He could be at risk for pseudomonas. He also has modest pyuria on UA but denies dysuria and I think UTI may be less likely. His BP and Lactate have normalized with appropriate IV fluid intervention in the ED.    -- Inpatient. NPO.  Continue empiric antibiotics: Cefepime and Azithromycin odered. Follow up cultures and check Pro-calcitonin. 100 ml/hour NS IV fluid ordered overnight. Orthostatics ordered. Fall precautions ordered.   -- Duonebs scheduled q4 hours, Albuterol nebs every 2 hours as needed. Consider adding steroids if he develops wheezing or worsening hypoxia.   Resume home inhalers including Spiriva, Breo Elipta when verified. Monitor oxygenation, currently above 90% on chronic 2 L NC. Robitussin ordered as needed for cough.     -- Last, we note that his last chest CT was in 2020 and also showed right upper lobe opacification at that time. He will likely need close outpatient follow up after discharge with repeat imaging to confirm resolution of infiltrate, otherwise further evaluation for alternative etiology may be needed    Acute on chronic anemia: He has a history of iron deficiency anemia. It seems he was hospitalized here 10/2020 for acute on chronic blood loss anemia and seen by ALESHIA Bliss and endoscopy tests revealed colonic AVM's which were coagulated.He also had diverticulosis seen on the endoscopy. In 8/2020 he had been found to have gastric AVM's.His most recent EGD from 10/2020 showed no acute abnormalities. During his last hospitalization 4/2021 he had concomitant severe acute anemia to HGB 6; he received transfusion and supportive acre and the HGB stabilized; he had no active bleeding seen and he declined repeat endoscopy tests. Now tonight he again has recurrent acute on chronic anemia, down to HGB 8 from prior 12, again without evident active bleeding, at least as of yet; he could have chronic occult GI bleeding in the setting of concomitant DOAC use, especially concerning in the setting of hypotension on presentation tonight.   Recent Labs   Lab Test 04/10/22  2034 01/13/22  1154 11/30/21  1123   HGB 8.0* 12.2* 12.5*     -- Repeat HGB in AM; consent signed in case of need for transfusion    -- Chadd BLAKE consulted  for further evaluation    -- Resume PPI when verified    -- Hold Apixaban for now    -- We note that he has hematuria on UA but the CT was negative for nephrolithiasis    FRED: On CKD Stage 3; Suspect due to hypovolemia. IVF ordered as above. Repeat Creatinine in AM.    Recent Labs   Lab Test 04/10/22  2034 09/08/21  1459 04/15/21  0813   CR 1.59* 1.24 0.98     Aortic stenosis: We note that he has a history of TAVR and bioprosthetic valve replacement for Aortic Stenosis in 2015; coronary angiogram at that time was negative for obstructive CAD. TTE 4/2021 showed preserved LVEF with mild to moderate prosthetic valve stenosis. Noted     PAF, chronic:     -- Telemetry; check EKG.    -- Resume Toprol when verified and pending resolution of hypotension; holding Apixaban as above for now; resume when able clinically     Recurrent PE: Noted     Dyslipidemia: Resume Lipitor when verified     Stage 4 Non-Hodgkins Lymphoma and MGUS: Followed by Dr. Zurita of [facility]. His lymphoma was diagnosed and treated with systemic chemotherapy in 2012 and reportedly has been in remission since. His MGUS is being monitored as an outpatient.    -- Noted    Chronic peripheral neuropathy:     -- Check B12 level    -- resume Cymbalta when verified    Bullous pemphigoid and chronic pruritus: By history; noted.    AAA: 3.5 cm, seen on CT; close outpatient follow up will be recommended at discharge    Suspected severe protein calorie malnutrition: Nutrition services consulted.    Rule Out COVID-19 infection  This patient was evaluated during a global COVID-19 pandemic, which necessitated consideration that the patient might be at risk for infection with the SARS-CoV-2 virus that causes COVID-19. Applicable protocols for evaluation were followed during the patient's care.   -negative COVID-19 PCR test result  -no current indication for precautions    Chief Complaint   Cough    History is obtained from the patient and the ED physician whom  I have spoken with    History of Present Illness   Hermilo Velasquez is a markedly pleasant 89 year old gentleman who presents with acute onset productive cough; he tells me he has had this cough for the past several days but it got much worse last night, causing associated insomnia; it is productive of thick phlegm and persists today. His family noted today he has developed associated generalized weakness and shakiness today and had him brought in. They are no longer available for further discussions. He himself confirms to me feeling very weak and unsteady on his feet today. He says he was feeling weak about a month ago as well and had a fall at that time. He has chronic bilateral knee soreness, worse on the right side. He otherwise denies abdominal or chest pain to me, or any fever, chills, or sweats, or nausea, melena, hematochezia, or hematemesis, or any dyspnea or upper back pain, or any dysuria, or hematuria, or any other acute complaints. His speech is rambling at times and he needs re-direction during the interview.    In the ED, initial BP was 73/35, pulse 100, T 98.1, RR 18, sats 92%.    CBC and CMP were notable for WBC 17.1, HGB 8.0, BUN 29, Cr 1.59, Ca 8.4, alb 2.6, tprot 5.9, glucose 134, otherwise were within the normal reference range. Lactate was 2.5. VBG showed 7.36/46. UA showed 13 WBC, greater than 182 RBC, negative nitrite and LE.COVID, Influenza, and RSV tests were negative. Blood and urine cultures were sent.    He was given 2 liters LR with improvement in BP to 115/55 and Lactate to 1.1 in the ED.    He was given Ceftriaxone in the ED.    Recent Results (from the past 24 hour(s))   XR Chest Port 1 View    Narrative    EXAM: XR CHEST PORT 1 VIEW  LOCATION: [facility]  DATE/TIME: 4/10/2022 8:37 PM    INDICATION: Sepsis.  COMPARISON: 06/23/2021 CT chest and 04/13/2021 CXR.      Impression    IMPRESSION: Sternotomy. AVR. CABG. Markedly shallow inspiration exaggerates  heart size which is within normal limits and unchanged. Pulmonary vascularity within normal limits. Strands of fibrosis in the lower lungs unchanged. Lungs otherwise clear. No   visible pleural fluid. Calcified tortuous thoracic aorta. Bones are demineralized.   CT Chest Abdomen Pelvis w/o Contrast    Narrative    EXAM: CT CHEST ABDOMEN PELVIS W/O CONTRAST  LOCATION: [facility]  DATE/TIME: 4/10/2022 10:15 PM    INDICATION: Sepsis.  COMPARISON: CT chest dated 06/23/2021 and CT abdomen and pelvis dated 04/13/2013.  TECHNIQUE: CT scan of the chest, abdomen, and pelvis was performed without IV contrast. Multiplanar reformats were obtained. Dose reduction techniques were used.   CONTRAST: None.    FINDINGS:   LUNGS AND PLEURA: Small right pleural effusion. Post surgical changes of the right lower lobe. Moderate biapical emphysematous change. Atelectasis versus scarring of the right upper lobe and lingula, correlate to exclude superimposed infection.    MEDIASTINUM/AXILLAE: Normal size heart. No pericardial effusion. No hilar or mediastinal lymphadenopathy. Prosthetic aortic valve.    CORONARY ARTERY CALCIFICATION: Moderate.    HEPATOBILIARY: Normal.    PANCREAS: Normal.    SPLEEN: Normal.    ADRENAL GLANDS: Normal.    KIDNEYS/BLADDER: Normal.    BOWEL: Normal.    LYMPH NODES: Normal.    VASCULATURE: 3.5 cm saccular, infrarenal abdominal aortic aneurysm seen arising from the left lateral inferior abdominal aorta. Moderate atherosclerotic vascular calcifications.    PELVIC ORGANS: Normal.    MUSCULOSKELETAL: Moderate to severe multilevel discogenic degenerative change with ankylosed thoracolumbar spine.      Impression    IMPRESSION:  1.  Small right pleural effusion.  2.  Bilateral discoid atelectasis, correlate to exclude superimposed infection.  3.  Moderate emphysema.  4.  Mildly enlarged heart with aortic valve replacement and coronary artery disease and atherosclerotic vascular disease.  5.   "3.5 cm saccular infrarenal abdominal aortic aneurysm.  6.  No obstruction, colitis, diverticulitis, or appendicitis.  7.  No obstructing renal or ureteral stones. No hydroureteronephrosis.       PHYSICAL EXAM  Blood pressure 115/55, pulse 80, temperature 98.1  F (36.7  C), temperature source Temporal, resp. rate 18, height 1.778 m (5' 10\"), weight 74.8 kg (165 lb), SpO2 100 %.  Constitutional: Alert and oriented to person, place and time, with re-direction; no apparent distress  HEENT: normocephalic dry mucus membranes  Respiratory: lungs diminished with faint rales to auscultation bilaterally; no wheezes at present  Cardiovascular: regular S1 S2  GI: abdomen soft non tender non distended bowel sounds positive  Lymph/Hematologic: pale, no cervical lymphadenopathy  Skin: no rash, good turgor  Musculoskeletal: mild bilateral LE edema  Neurologic: extra-ocular muscles intact; moves all four extremities  Psychiatric: appropriate affect, speech tangential at times     DVT Prophylaxis: Pneumatic Compression Devices  Code Status: Full Code as per POLST signed in 2016    Disposition: Expected discharge in 2-4 days    Nadeem Hernandez MD    Past Medical History    I have reviewed this patient's medical history and updated it with pertinent information if needed.   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 Vasques      Aortic stenosis     Severe AS, 9/2015 AVR - [facility] HENOK TRIFECTA Bovine bioprosthesis 25MM TF-25A     Atrial fibrillation (H)     9/2015 Paroxysmal post op Afib - discharged on Warfarin and a beta blocker     COPD (chronic obstructive pulmonary disease) (H)      Deep vein thrombosis (H)      GERD (gastroesophageal reflux disease)      Heart murmur      Monoclonal gammopathy     plasmacyte prominent causing monoclonal gammopathy     Need for SBE (subacute bacterial endocarditis) prophylaxis      Neuropathy      Other and unspecified " hyperlipidemia      Other malignant lymphomas     non hodgkin's lymphoma     RBBB (right bundle branch block)      Severe sepsis with acute organ dysfunction (H) 11/16/2015     Unspecified hereditary and idiopathic peripheral neuropathy        Past Surgical History   I have reviewed this patient's surgical history and updated it with pertinent information if needed.  Past Surgical History:   Procedure Laterality Date     APPENDECTOMY       AS TOTAL KNEE ARTHROPLASTY       BACK SURGERY       ESOPHAGOSCOPY, GASTROSCOPY, DUODENOSCOPY (EGD), COMBINED N/A 11/28/2015    Procedure: COMBINED ESOPHAGOSCOPY, GASTROSCOPY, DUODENOSCOPY (EGD);  Surgeon: Danis Castillo MD;  Location:  GI     ESOPHAGOSCOPY, GASTROSCOPY, DUODENOSCOPY (EGD), COMBINED N/A 7/26/2018    Procedure: COMBINED ESOPHAGOSCOPY, GASTROSCOPY, DUODENOSCOPY (EGD);;  Surgeon: Toby Dong DO;  Location:  GI     ESOPHAGOSCOPY, GASTROSCOPY, DUODENOSCOPY (EGD), COMBINED N/A 8/17/2020    Procedure: ESOPHAGOGASTRODUODENOSCOPY (EGD);  Surgeon: Herrera Henry MD;  Location:  GI     ESOPHAGOSCOPY, GASTROSCOPY, DUODENOSCOPY (EGD), COMBINED N/A 10/24/2020    Procedure: ESOPHAGOGASTRODUODENOSCOPY (EGD);  Surgeon: Vick Florentino MD;  Location:  GI     HERNIA REPAIR  2006     HERNIORRHAPHY VENTRAL  4/17/2013    Procedure: HERNIORRHAPHY VENTRAL;  VENTRAL HERNIA REPAIR WITH MESH;  Surgeon: Patel Guzman MD;  Location:  OR     KNEE SURGERY      arthroscopic right knee surgery      LOBECTOMY LUNG Right 3/26/2019    POSSIBLE LOBECTOMY LUNG per Dr[facility] at [facility]     REPAIR LIGAMENT ANKLE  2/23/2012    Procedure:REPAIR LIGAMENT ANKLE; LEFT TARSAL TUNNEL RELEASE OF KNOT OF ARIEL RELEASE; Surgeon:SAUL PUENTE; Location: OR     REPLACE VALVE AORTIC N/A 9/3/2015    Procedure: REPLACE VALVE AORTIC;  Surgeon: Antonino Mitchell MD;  Location:  OR     ROTATOR CUFF REPAIR RT/LT      bilateral     SPINE SURGERY      3 spine  surgeries     THORACOTOMY, WEDGE RESECTION LUNG, COMBINED Right 3/26/2019    RIGHT THORACOTOMY, WEDGE RESECTION, RIGHT LOWER LOBE LUNG NODULE,;  Surgeon: Jose A Vasques MD;  Location: [facility] OR     TONSILLECTOMY       TURP         Prior to Admission Medications   Prior to Admission Medications   Prescriptions Last Dose Informant Patient Reported? Taking?   ACE/ARB/ARNI NOT PRESCRIBED (INTENTIONAL)   No No   Sig: Please choose reason not prescribed from choices below.   DULoxetine (CYMBALTA) 30 MG capsule   Yes No   Sig: Take 60 mg by mouth daily   albuterol (PROAIR HFA/PROVENTIL HFA/VENTOLIN HFA) 108 (90 Base) MCG/ACT inhaler  Self No No   Sig: Inhale 1-2 puffs into the lungs every 6 hours as needed for shortness of breath / dyspnea or wheezing   albuterol (PROVENTIL) (2.5 MG/3ML) 0.083% neb solution   No No   Sig: Take 1 vial (2.5 mg) by nebulization every 6 hours as needed for shortness of breath / dyspnea or wheezing   apixaban ANTICOAGULANT (ELIQUIS ANTICOAGULANT) 5 MG tablet   No No   Sig: Take 1 tablet (5 mg) by mouth 2 times daily   atorvastatin (LIPITOR) 10 MG tablet   No No   Sig: Take 1 tablet (10 mg) by mouth daily   clotrimazole (LOTRIMIN) 1 % external cream   No No   Sig: Apply topically 2 times daily   famotidine (PEPCID) 40 MG tablet   No No   Sig: Take 1 tablet (40 mg) by mouth 2 times daily   ferrous sulfate (FE TABS) 325 (65 Fe) MG EC tablet  Self No No   Sig: Take 1 tablet (325 mg) by mouth daily   fluticasone-vilanterol (BREO ELLIPTA) 200-25 MCG/INH inhaler   No No   Sig: Inhale 1 puff into the lungs daily   gabapentin (NEURONTIN) 300 MG capsule   Yes No   Sig: Take 600 mg by mouth At Bedtime   guaiFENesin (ROBITUSSIN) 20 mg/mL SOLN solution   No No   Sig: Take 10 mLs by mouth every 4 hours as needed for other (secretion expectorant)   hydrOXYzine (VISTARIL) 50 MG capsule  Self Yes No   Sig: Take 50 mg by mouth every 6 hours as needed for itching   hydrocortisone (CORTAID) 1 % external  "cream   No No   Sig: Apply topically 2 times daily   metoprolol succinate ER (TOPROL-XL) 25 MG 24 hr tablet  Self No No   Sig: Take 0.5 tablets (12.5 mg) by mouth daily   tiotropium (SPIRIVA HANDIHALER) 18 MCG inhaled capsule  Self No No   Sig: Inhale 1 capsule (18 mcg) into the lungs daily   triamcinolone (KENALOG) 0.1 % external cream  Self No No   Sig: Apply topically 2 times daily as needed for irritation      Facility-Administered Medications: None     Allergies   Allergies   Allergen Reactions     Lidocaine Blisters     Allergy to lidocaine ointment     Omeprazole Itching     Pantoprazole Itching     Prevacid [Lansoprazole] Itching     Lasix [Furosemide] Rash     Penicillins Rash     \"broke out from injection\" 60 yrs ago         Social History   I have reviewed this patient's social history and updated it with pertinent information if needed. Hermilo Velasquez  reports that he quit smoking about 24 years ago. He has a 110.00 pack-year smoking history. He has never used smokeless tobacco. He reports current alcohol use. He reports that he does not use drugs.    Family History   Family history assessed and, except as above, is non-contributory.    Family History   Problem Relation Age of Onset     C.A.D. Father      Emphysema Father      Melanoma No family hx of      Skin Cancer No family hx of        Review of Systems   The 10 point Review of Systems is negative other than noted in the HPI or here.     Primary Care Physician   Karson Bishop    Data   Labs Ordered and Resulted from Time of ED Arrival to Time of ED Departure   COMPREHENSIVE METABOLIC PANEL - Abnormal       Result Value    Sodium 138      Potassium 4.5      Chloride 107      Carbon Dioxide (CO2) 26      Anion Gap 5      Urea Nitrogen 29      Creatinine 1.59 (*)     Calcium 8.4 (*)     Glucose 134 (*)     Alkaline Phosphatase 91      AST 11      ALT 10      Protein Total 5.9 (*)     Albumin 2.6 (*)     Bilirubin Total 0.5      GFR Estimate 41 " (*)    ROUTINE UA WITH MICROSCOPIC REFLEX TO CULTURE - Abnormal    Color Urine Yellow      Appearance Urine Clear      Glucose Urine Negative      Bilirubin Urine Negative      Ketones Urine Negative      Specific Gravity Urine 1.021      Blood Urine Large (*)     pH Urine 5.5      Protein Albumin Urine 30  (*)     Urobilinogen Urine Normal      Nitrite Urine Negative      Leukocyte Esterase Urine Negative      Mucus Urine Present (*)     RBC Urine >182 (*)     WBC Urine 13 (*)     Hyaline Casts Urine 3 (*)    CBC WITH PLATELETS AND DIFFERENTIAL - Abnormal    WBC Count 17.1 (*)     RBC Count 2.95 (*)     Hemoglobin 8.0 (*)     Hematocrit 26.7 (*)     MCV 91      MCH 27.1      MCHC 30.0 (*)     RDW 14.1      Platelet Count 401      % Neutrophils 82      % Lymphocytes 5      % Monocytes 9      % Eosinophils 3      % Basophils 0      % Immature Granulocytes 1      NRBCs per 100 WBC 0      Absolute Neutrophils 14.0 (*)     Absolute Lymphocytes 0.9      Absolute Monocytes 1.6 (*)     Absolute Eosinophils 0.5      Absolute Basophils 0.0      Absolute Immature Granulocytes 0.1      Absolute NRBCs 0.0     ISTAT GASES LACTATE VENOUS POCT - Abnormal    Lactic Acid POCT 2.5 (*)     Bicarbonate Venous POCT 26      O2 Sat, Venous POCT 65 (*)     pCO2V Venous POCT 46      pH Venous POCT 7.36      pO2 Venous POCT 36     INFLUENZA A/B & SARS-COV2 PCR MULTIPLEX - Normal    Influenza A PCR Negative      Influenza B PCR Negative      RSV PCR Negative      SARS CoV2 PCR Negative     LACTIC ACID WHOLE BLOOD - Normal    Lactic Acid 1.1     ISTAT GASES LACTATE VENOUS POCT   BLOOD CULTURE   BLOOD CULTURE   URINE CULTURE       Data reviewed today:  I personally reviewed the chest CT image(s) showing RUL pneumonia.

## 2022-04-11 NOTE — PROGRESS NOTES
[facility]  Hospitalist Progress Note  En Malagon MD  04/11/2022    Assessment & Plan   Hermilo Velasquez is a markedly pleasant 89 year old gentleman former smoker with extensive past medical history that is most notable for chronic respiratory failure due to COPD on oxygen, aortic stenosis status post AVR and bioprosthetic valve replacement, chronic paroxysmal atrial fibrillation and recurrent PE on Apixaban, prior lung cancer, and prior Non-Hodgkins Lymphoma, among multiple others; who presents with productive cough and weakness and is found to have suspected severe sepsis due to right upper lobe and lingular pneumonia, as well as acute anemia.       Severe sepsis due to right upper lobe and lingular pneumonia: We note that he has a history of Stage 1 lung adenocarcinoma status post right lower lobe wedge resection in 2019. He also has COPD and uses oxygen continuously 2 liters by nasal cannula at home. He has had multiple prior episodes of pneumonia. Most recently he was hospitalized here 4/2021 for severe sepsis due to left lower lobe pneumonia, treated successfully with cephalosporin and azithromycin. Now he presents with acute productive cough with generalized weakness. In the ED he is found to be hypotensive and hypoxic as low as 85% on room air. He has leukocytosis and elevated Lactate. CXR and CT of chest, abdomen and pelvis are most notable for pulmonary opacifications in the right upper lobe bordering the lingula. It also showed a small right sided pleural effusion, changes consistent with emphysema, and suspected lung scarring. Overall, we suspect severe sepsis due to mult-lobar pneumonia. He could be at risk for pseudomonas. He also has modest pyuria on UA but denies dysuria and I think UTI may be less likely. His BP and Lactate have normalized with appropriate IV fluid intervention in the ED.    -- Inpatient. NPO with NS IVF.    -- Continue empiric antibiotics:  Cefepime and Azithromycin odered.    -- Follow up cultures  Pro-calcitonin in 1.01.     -- Duonebs scheduled q4 hours, Albuterol nebs every 2 hours as needed.  No evidence of wheezing or worsening hypoxia.      -- Resume home inhalers including Spiriva, Breo Elipta when verified. Monitor oxygenation, currently above 90% on chronic 2 L NC. Robitussin ordered as needed for cough.     -- Last, we note that his last chest CT was in 2020 and also showed right upper lobe opacification at that time. He will likely need close outpatient follow up after discharge with repeat imaging to confirm resolution of infiltrate, otherwise further evaluation for alternative etiology may be needed     Acute on chronic anemia: He has a history of iron deficiency anemia. It seems he was hospitalized here 10/2020 for acute on chronic blood loss anemia and seen by ALESHIA Bliss and endoscopy tests revealed colonic AVM's which were coagulated.He also had diverticulosis seen on the endoscopy. In 8/2020 he had been found to have gastric AVM's.His most recent EGD from 10/2020 showed no acute abnormalities. During his last hospitalization 4/2021 he had concomitant severe acute anemia to HGB 6; he received transfusion and supportive acre and the HGB stabilized; he had no active bleeding seen and he declined repeat endoscopy tests. Now tonight he again has recurrent acute on chronic anemia, down to HGB 8 from prior 12, again without evident active bleeding, at least as of yet; he could have chronic occult GI bleeding in the setting of concomitant DOAC use, especially concerning in the setting of hypotension on presentation tonight.     -- Repeat HGB 12.2 PTA to 8 >> 6.8, proceed with 1 unit of PRBC    -- consented and signed      -- [facility] consulted for further evaluation    -- Resume PPI when verified    -- Hold Apixaban for now    -- We note that he has hematuria on UA but the CT was negative for nephrolithiasis     FRED: On CKD Stage 3; Suspect  due to hypovolemia. IVF ordered as above. Repeat Creatinine in AM.           Recent Labs   Lab Test 04/10/22  2034 09/08/21  1459 04/15/21  0813   CR 1.59* 1.24 0.98   CR improved 1.59 to 1.28       Aortic stenosis: We note that he has a history of TAVR and bioprosthetic valve replacement for Aortic Stenosis in 2015; coronary angiogram at that time was negative for obstructive CAD. TTE 4/2021 showed preserved LVEF with mild to moderate prosthetic valve stenosis. Noted     PAF, chronic:     -- Telemetry     -- Resume Toprol with resolution of hypotension; holding Apixaban as above for now; resume when able clinically     Recurrent PE: Noted     Dyslipidemia: Resume Lipitor      Stage 4 Non-Hodgkins Lymphoma and MGUS: Followed by Dr. Zurita of [facility]. His lymphoma was diagnosed and treated with systemic chemotherapy in 2012 and reportedly has been in remission since. His MGUS is being monitored as an outpatient.    -- Noted     Chronic peripheral neuropathy:     -- Check B12 level pending    -- resume Cymbalta       Bullous pemphigoid and chronic pruritus: By history; noted.     AAA: 3.5 cm, seen on CT; close outpatient follow up will be recommended at discharge     Suspected severe protein calorie malnutrition: Nutrition services consulted.     Rule Out COVID-19 infection  This patient was evaluated during a global COVID-19 pandemic, which necessitated consideration that the patient might be at risk for infection with the SARS-CoV-2 virus that causes COVID-19. Applicable protocols for evaluation were followed during the patient's care.   -negative COVID-19 PCR test result  -no current indication for precautions    Disposition    -- anticipate in 2-4 days         Interval History   -- chart reviewed  -- noted hgb down  -- GI consult noted and appreciated  -- meds reviewed    -Data reviewed today: I reviewed all new labs and imaging over the last 24 hours. I personally reviewed no images or EKG's  "today.    Physical Exam    , Blood pressure 103/52, pulse 90, temperature 97.8  F (36.6  C), temperature source Oral, resp. rate 20, height 1.778 m (5' 10\"), weight 74.8 kg (165 lb), SpO2 93 %.  Vitals:    04/10/22 2003   Weight: 74.8 kg (165 lb)     Vital Signs with Ranges  Temp:  [97.8  F (36.6  C)-98.3  F (36.8  C)] 97.8  F (36.6  C)  Pulse:  [] 90  Resp:  [15-22] 20  BP: ()/(35-62) 103/52  SpO2:  [85 %-100 %] 93 %  I/O's Last 24 hours  I/O last 3 completed shifts:  In: 2344 [IV Piggyback:2344]  Out: -     Constitutional: Awake, alert, cooperative, no apparent distress  Respiratory: Clear to auscultation bilaterally, no crackles or wheezing  Cardiovascular: Regular rate and rhythm, normal S1 and S2   GI: Normal bowel sounds, soft, non-distended, non-tender  Skin/Integumen: No rashes, no cyanosis, no edema  Other:      Medications   All medications were reviewed.    sodium chloride 100 mL/hr at 04/11/22 0807       atorvastatin  10 mg Oral Daily     [START ON 4/12/2022] azithromycin  250 mg Intravenous Q24H     ceFEPIme (MAXIPIME) IV  2 g Intravenous Q12H     DULoxetine  60 mg Oral Daily     famotidine  40 mg Oral BID     fluticasone-vilanterol  1 puff Inhalation Daily     gabapentin  600 mg Oral At Bedtime     ipratropium - albuterol 0.5 mg/2.5 mg/3 mL  3 mL Nebulization Q4H While awake     metoprolol succinate ER  12.5 mg Oral Daily     sodium chloride (PF)  3 mL Intracatheter Q8H     umeclidinium  1 puff Inhalation Daily        Data   Recent Labs   Lab 04/11/22  0744 04/10/22  2034   WBC 11.6* 17.1*   HGB 6.8* 8.0*   MCV 91 91    401    138   POTASSIUM 4.2 4.5   CHLORIDE 111* 107   CO2 25 26   BUN 27 29   CR 1.28* 1.59*   ANIONGAP 3 5   AMRITA 8.0* 8.4*   * 134*   ALBUMIN 2.2* 2.6*   PROTTOTAL 5.1* 5.9*   BILITOTAL 0.3 0.5   ALKPHOS 78 91   ALT 10 10   AST 17 11       Recent Results (from the past 24 hour(s))   XR Chest Port 1 View    Narrative    EXAM: XR CHEST PORT 1 " VIEW  LOCATION: [facility]  DATE/TIME: 4/10/2022 8:37 PM    INDICATION: Sepsis.  COMPARISON: 06/23/2021 CT chest and 04/13/2021 CXR.      Impression    IMPRESSION: Sternotomy. AVR. CABG. Markedly shallow inspiration exaggerates heart size which is within normal limits and unchanged. Pulmonary vascularity within normal limits. Strands of fibrosis in the lower lungs unchanged. Lungs otherwise clear. No   visible pleural fluid. Calcified tortuous thoracic aorta. Bones are demineralized.   CT Chest Abdomen Pelvis w/o Contrast    Narrative    EXAM: CT CHEST ABDOMEN PELVIS W/O CONTRAST  LOCATION: [facility]  DATE/TIME: 4/10/2022 10:15 PM    INDICATION: Sepsis.  COMPARISON: CT chest dated 06/23/2021 and CT abdomen and pelvis dated 04/13/2013.  TECHNIQUE: CT scan of the chest, abdomen, and pelvis was performed without IV contrast. Multiplanar reformats were obtained. Dose reduction techniques were used.   CONTRAST: None.    FINDINGS:   LUNGS AND PLEURA: Small right pleural effusion. Post surgical changes of the right lower lobe. Moderate biapical emphysematous change. Atelectasis versus scarring of the right upper lobe and lingula, correlate to exclude superimposed infection.    MEDIASTINUM/AXILLAE: Normal size heart. No pericardial effusion. No hilar or mediastinal lymphadenopathy. Prosthetic aortic valve.    CORONARY ARTERY CALCIFICATION: Moderate.    HEPATOBILIARY: Normal.    PANCREAS: Normal.    SPLEEN: Normal.    ADRENAL GLANDS: Normal.    KIDNEYS/BLADDER: Normal.    BOWEL: Normal.    LYMPH NODES: Normal.    VASCULATURE: 3.5 cm saccular, infrarenal abdominal aortic aneurysm seen arising from the left lateral inferior abdominal aorta. Moderate atherosclerotic vascular calcifications.    PELVIC ORGANS: Normal.    MUSCULOSKELETAL: Moderate to severe multilevel discogenic degenerative change with ankylosed thoracolumbar spine.      Impression    IMPRESSION:  1.  Small  right pleural effusion.  2.  Bilateral discoid atelectasis, correlate to exclude superimposed infection.  3.  Moderate emphysema.  4.  Mildly enlarged heart with aortic valve replacement and coronary artery disease and atherosclerotic vascular disease.  5.  3.5 cm saccular infrarenal abdominal aortic aneurysm.  6.  No obstruction, colitis, diverticulitis, or appendicitis.  7.  No obstructing renal or ureteral stones. No hydroureteronephrosis.       En Malagon MD  Text Page  (7am to 6pm)

## 2022-04-11 NOTE — PROGRESS NOTES
Pt is AOx4, up with A2 gb/walker not oob this shift, VSS on 6L NC-orthostatic BP not completed this shift due to low hemoglobin and increased risk for falls , denies pain this shift. Hemoglobin came back 6.8 orders to transfuse placed- 1 unit of RBC given, EGD completed nothing abnormal noted. Clear liquid diet-pt to have colonoscopy tomorrow afternoon around 1630. Tele is NSR. Discharge is pending.

## 2022-04-12 LAB
ANION GAP SERPL CALCULATED.3IONS-SCNC: 3 MMOL/L (ref 3–14)
ATRIAL RATE - MUSE: 87 BPM
BUN SERPL-MCNC: 17 MG/DL (ref 7–30)
CALCIUM SERPL-MCNC: 8.6 MG/DL (ref 8.5–10.1)
CHLORIDE BLD-SCNC: 109 MMOL/L (ref 94–109)
CO2 SERPL-SCNC: 26 MMOL/L (ref 20–32)
COLONOSCOPY: NORMAL
CREAT SERPL-MCNC: 0.97 MG/DL (ref 0.66–1.25)
DIASTOLIC BLOOD PRESSURE - MUSE: NORMAL MMHG
ERYTHROCYTE [DISTWIDTH] IN BLOOD BY AUTOMATED COUNT: 14.4 % (ref 10–15)
GFR SERPL CREATININE-BSD FRML MDRD: 75 ML/MIN/1.73M2
GLUCOSE BLD-MCNC: 102 MG/DL (ref 70–99)
HCT VFR BLD AUTO: 27.1 % (ref 40–53)
HGB BLD-MCNC: 8 G/DL (ref 13.3–17.7)
INTERPRETATION ECG - MUSE: NORMAL
MCH RBC QN AUTO: 26.4 PG (ref 26.5–33)
MCHC RBC AUTO-ENTMCNC: 29.5 G/DL (ref 31.5–36.5)
MCV RBC AUTO: 89 FL (ref 78–100)
P AXIS - MUSE: 51 DEGREES
PLATELET # BLD AUTO: 355 10E3/UL (ref 150–450)
POTASSIUM BLD-SCNC: 4.3 MMOL/L (ref 3.4–5.3)
PR INTERVAL - MUSE: 208 MS
QRS DURATION - MUSE: 130 MS
QT - MUSE: 394 MS
QTC - MUSE: 474 MS
R AXIS - MUSE: 8 DEGREES
RBC # BLD AUTO: 3.03 10E6/UL (ref 4.4–5.9)
SODIUM SERPL-SCNC: 138 MMOL/L (ref 133–144)
SYSTOLIC BLOOD PRESSURE - MUSE: NORMAL MMHG
T AXIS - MUSE: 55 DEGREES
VENTRICULAR RATE- MUSE: 87 BPM
WBC # BLD AUTO: 9.1 10E3/UL (ref 4–11)

## 2022-04-12 PROCEDURE — 36415 COLL VENOUS BLD VENIPUNCTURE: CPT | Performed by: INTERNAL MEDICINE

## 2022-04-12 PROCEDURE — 45382 COLONOSCOPY W/CONTROL BLEED: CPT | Performed by: INTERNAL MEDICINE

## 2022-04-12 PROCEDURE — 250N000009 HC RX 250: Performed by: HOSPITALIST

## 2022-04-12 PROCEDURE — 93010 ELECTROCARDIOGRAM REPORT: CPT | Performed by: INTERNAL MEDICINE

## 2022-04-12 PROCEDURE — 93005 ELECTROCARDIOGRAM TRACING: CPT

## 2022-04-12 PROCEDURE — 0D5H8ZZ DESTRUCTION OF CECUM, VIA NATURAL OR ARTIFICIAL OPENING ENDOSCOPIC: ICD-10-PCS | Performed by: INTERNAL MEDICINE

## 2022-04-12 PROCEDURE — 94640 AIRWAY INHALATION TREATMENT: CPT

## 2022-04-12 PROCEDURE — 250N000011 HC RX IP 250 OP 636: Performed by: INTERNAL MEDICINE

## 2022-04-12 PROCEDURE — G0500 MOD SEDAT ENDO SERVICE >5YRS: HCPCS | Performed by: INTERNAL MEDICINE

## 2022-04-12 PROCEDURE — 999N000157 HC STATISTIC RCP TIME EA 10 MIN

## 2022-04-12 PROCEDURE — 258N000003 HC RX IP 258 OP 636: Performed by: HOSPITALIST

## 2022-04-12 PROCEDURE — 250N000013 HC RX MED GY IP 250 OP 250 PS 637: Performed by: INTERNAL MEDICINE

## 2022-04-12 PROCEDURE — 120N000001 HC R&B MED SURG/OB

## 2022-04-12 PROCEDURE — 250N000011 HC RX IP 250 OP 636: Performed by: HOSPITALIST

## 2022-04-12 PROCEDURE — 94640 AIRWAY INHALATION TREATMENT: CPT | Mod: 76

## 2022-04-12 PROCEDURE — 85027 COMPLETE CBC AUTOMATED: CPT | Performed by: INTERNAL MEDICINE

## 2022-04-12 PROCEDURE — 80048 BASIC METABOLIC PNL TOTAL CA: CPT | Performed by: INTERNAL MEDICINE

## 2022-04-12 PROCEDURE — 99239 HOSP IP/OBS DSCHRG MGMT >30: CPT | Performed by: INTERNAL MEDICINE

## 2022-04-12 RX ORDER — FAMOTIDINE 20 MG/1
20 TABLET, FILM COATED ORAL 2 TIMES DAILY
Start: 2022-04-12 | End: 2022-05-08

## 2022-04-12 RX ORDER — AZITHROMYCIN 250 MG/1
250 TABLET, FILM COATED ORAL DAILY
Qty: 3 TABLET | Refills: 0 | Status: SHIPPED | OUTPATIENT
Start: 2022-04-12 | End: 2022-04-15

## 2022-04-12 RX ORDER — FLUMAZENIL 0.1 MG/ML
0.2 INJECTION, SOLUTION INTRAVENOUS
Status: CANCELLED | OUTPATIENT
Start: 2022-04-12 | End: 2022-04-13

## 2022-04-12 RX ORDER — FENTANYL CITRATE 50 UG/ML
INJECTION, SOLUTION INTRAMUSCULAR; INTRAVENOUS PRN
Status: COMPLETED | OUTPATIENT
Start: 2022-04-12 | End: 2022-04-12

## 2022-04-12 RX ORDER — FAMOTIDINE 20 MG/1
20 TABLET, FILM COATED ORAL 2 TIMES DAILY
Status: DISCONTINUED | OUTPATIENT
Start: 2022-04-12 | End: 2022-04-13 | Stop reason: HOSPADM

## 2022-04-12 RX ADMIN — IPRATROPIUM BROMIDE AND ALBUTEROL SULFATE 3 ML: .5; 3 SOLUTION RESPIRATORY (INHALATION) at 19:06

## 2022-04-12 RX ADMIN — IPRATROPIUM BROMIDE AND ALBUTEROL SULFATE 3 ML: .5; 3 SOLUTION RESPIRATORY (INHALATION) at 07:35

## 2022-04-12 RX ADMIN — IPRATROPIUM BROMIDE AND ALBUTEROL SULFATE 3 ML: .5; 3 SOLUTION RESPIRATORY (INHALATION) at 15:17

## 2022-04-12 RX ADMIN — CEFEPIME HYDROCHLORIDE 2 G: 2 INJECTION, POWDER, FOR SOLUTION INTRAVENOUS at 18:12

## 2022-04-12 RX ADMIN — METOPROLOL SUCCINATE 12.5 MG: 25 TABLET, EXTENDED RELEASE ORAL at 09:18

## 2022-04-12 RX ADMIN — MAGNESIUM CITRATE 296 ML: 1.75 LIQUID ORAL at 04:18

## 2022-04-12 RX ADMIN — DULOXETINE 60 MG: 60 CAPSULE, DELAYED RELEASE ORAL at 09:18

## 2022-04-12 RX ADMIN — MIDAZOLAM 2 MG: 1 INJECTION INTRAMUSCULAR; INTRAVENOUS at 12:23

## 2022-04-12 RX ADMIN — FENTANYL CITRATE 50 MCG: 50 INJECTION, SOLUTION INTRAMUSCULAR; INTRAVENOUS at 12:25

## 2022-04-12 RX ADMIN — AZITHROMYCIN MONOHYDRATE 250 MG: 500 INJECTION, POWDER, LYOPHILIZED, FOR SOLUTION INTRAVENOUS at 00:40

## 2022-04-12 RX ADMIN — ATORVASTATIN CALCIUM 10 MG: 10 TABLET, FILM COATED ORAL at 09:18

## 2022-04-12 RX ADMIN — AZITHROMYCIN MONOHYDRATE 250 MG: 500 INJECTION, POWDER, LYOPHILIZED, FOR SOLUTION INTRAVENOUS at 23:58

## 2022-04-12 RX ADMIN — CEFEPIME HYDROCHLORIDE 2 G: 2 INJECTION, POWDER, FOR SOLUTION INTRAVENOUS at 06:10

## 2022-04-12 RX ADMIN — FAMOTIDINE 40 MG: 20 TABLET ORAL at 09:18

## 2022-04-12 ASSESSMENT — ACTIVITIES OF DAILY LIVING (ADL)
ADLS_ACUITY_SCORE: 13
ADLS_ACUITY_SCORE: 11
ADLS_ACUITY_SCORE: 11
ADLS_ACUITY_SCORE: 13
ADLS_ACUITY_SCORE: 11
ADLS_ACUITY_SCORE: 13
ADLS_ACUITY_SCORE: 11
ADLS_ACUITY_SCORE: 13
ADLS_ACUITY_SCORE: 11
ADLS_ACUITY_SCORE: 13
ADLS_ACUITY_SCORE: 11
ADLS_ACUITY_SCORE: 13
ADLS_ACUITY_SCORE: 11
ADLS_ACUITY_SCORE: 11
ADLS_ACUITY_SCORE: 13
ADLS_ACUITY_SCORE: 11

## 2022-04-12 NOTE — PROGRESS NOTES
[facility]  Gastroenterology Progress Note     Hermilo Velasquez MRN# [id number]   YOB: 1932 Age: 89 year old          Assessment and Plan:     Hermilo Olson is a 89 year old male with acute on chronic anemia, admitted on 4/10 with sepsis likely due to pneumonia with anemia.      Acute on chronic anemia  Prior GI bleed due to AVMs  Chronically anticoagulated on Eliquis  Hemoglobin baseline is 12.2 and now stable around 8.  He has had no signs of active bleeding.   Previous EGD in 08/2020 noted non bleeding angioectasias.   He had a colonoscopy in 10/2020 noted sex recently bleeding colonic angiodysplastic lesions- treated with APC. One 10 mm polyp removed in sigmoid colon.   Has been anticoagulated on apixiban and currently held  Cause of anemia may be multifactorial, being treated for sepsis from pneumonia vs slow upper or lower GI bleed  4/11 EGD unremarkable with no signs of bleeding  I have discussed with patients spouse, Roberta, labs findings and recommendations for colonoscopy for anemia     -- Finish colon prep by 12 noon  -- Keep NPO  -- colonoscopy today  -- Serial hemoglobin and transfuse for hemoglobin of 7 or less  -- Hold apixiban            Interval History:     no new complaints, doing well, doing well; no cp, sob, n/v/d, or abd pain., and still finishing bowel prep- stools watery and light brown              Review of Systems:     C: NEGATIVE for fever, chills, change in weight  E/M: NEGATIVE for ear, mouth and throat problems  R: NEGATIVE for significant cough or SOB  CV: NEGATIVE for chest pain, palpitations or peripheral edema             Medications:   I have reviewed this patient's current medications   atorvastatin  10 mg Oral Daily    azithromycin  250 mg Intravenous Q24H    ceFEPIme (MAXIPIME) IV  2 g Intravenous Q12H    DULoxetine  60 mg Oral Daily    famotidine  40 mg Oral BID    fluticasone-vilanterol  1 puff Inhalation Daily    gabapentin  600 mg  Oral At Bedtime    ipratropium - albuterol 0.5 mg/2.5 mg/3 mL  3 mL Nebulization Q4H While awake    metoprolol succinate ER  12.5 mg Oral Daily    sodium chloride (PF)  3 mL Intracatheter Q8H                  Physical Exam:   Vitals were reviewed  Vital Signs with Ranges  Temp:  [96.9  F (36.1  C)-99  F (37.2  C)] 97.9  F (36.6  C)  Pulse:  [70-92] 82  Resp:  [13-20] 17  BP: ()/(40-66) 108/50  SpO2:  [90 %-100 %] 98 %  I/O last 3 completed shifts:  In: 590 [P.O.:240]  Out: 1001 [Urine:1000; Emesis/NG output:1]  Constitutional: healthy, alert, and no distress   Cardiovascular: negative, PMI normal. No lifts, heaves, or thrills. RRR. No murmurs, clicks gallops or rub  Respiratory: negative, Percussion normal. Good diaphragmatic excursion. Lungs clear  Abdomen: Abdomen soft, non-tender. BS normal. No masses, organomegaly           Data:   I reviewed the patient's new clinical lab test results.   Recent Labs   Lab Test 04/12/22  0754 04/11/22  0744 04/10/22  2034 10/24/20  0037 10/23/20  1617 08/18/20  0829 08/17/20  0948 08/16/20  0829   WBC 9.1 11.6* 17.1*   < > 8.0   < > 8.9 9.5   HGB 8.0* 6.8* 8.0*   < > 6.6*   < > 7.7* 7.5*   MCV 89 91 91   < > 89   < > 82 81    329 401   < > 279   < > 273 272   INR  --   --   --   --  1.39*  --  1.21* 1.30*    < > = values in this interval not displayed.     Recent Labs   Lab Test 04/12/22  0754 04/11/22  0744 04/10/22  2034 09/08/21  1459   POTASSIUM 4.3 4.2 4.5 4.0   CHLORIDE 109 111* 107 107   CO2  --  25 26 29   BUN  --  27 29 25   ANIONGAP  --  3 5 <1*     Recent Labs   Lab Test 04/11/22  0744 04/10/22  2236 04/10/22  2034 09/08/21  1459 08/16/20  0829 08/16/20  0400 03/23/20  0459 03/22/20  1950 03/22/20  1914   ALBUMIN 2.2*  --  2.6* 3.1*   < >  --    < >  --  2.6*   BILITOTAL 0.3  --  0.5 0.6   < >  --    < >  --  0.6   ALT 10  --  10 12   < >  --    < >  --  27   AST 17  --  11 10   < >  --    < >  --  101*   PROTEIN  --  30 *  --   --   --  Negative  --  10*   --    LIPASE  --   --   --   --   --   --   --   --  54*    < > = values in this interval not displayed.       I reviewed the patient's new imaging results.    All laboratory data reviewed  All imaging studies reviewed by me.    Sammie Cox PA-C,  4/12/2022  [facility]  Office : [phone]  Cell: [phone] (Dr. Florentino)  Cell: [phone] (Sammie Cox PA-C)

## 2022-04-12 NOTE — PROGRESS NOTES

## 2022-04-12 NOTE — PLAN OF CARE
"Goal Outcome Evaluation:       1900-0700     Pt is A&Ox3;ocassional forgetfulness/disorientation to situation. VSS. Pt is on 3L of O2 via NC. Tele; NSR. Pt is on clear liquids for colonoscopy procedure 11/12. Pt is not being fully compliant with bowel prep medications. Pt drank 1 1/2 bottles of Gatorade that has miralax in it and drank only half of magnesium citrate solution. Pt had nausea and vomited 1x during this shift; pt declined PRN medication intervention. Pt wanted to dangle by bed side with cool wash cloth applied and deep breathes; effective per pt. Writer attempted to obtain sputum culture from pt during this shift 2x with no success, pt stated \"Can't you see I'm sleeping? Let me sleep. Come back when the sun is here\"     "

## 2022-04-12 NOTE — DISCHARGE SUMMARY
[facility]  Discharge Summary        Hermilo Velasquez MRN# [id number]   YOB: 1932 Age: 89 year old     Date of Admission:  4/10/2022  Date of Discharge:  4/12/2022  Admitting Physician:  Nadeem Hernandez MD  Discharge Physician: En Malagon MD  Discharging Service: Hospitalist     Primary Provider:  Karson Bishop  Primary Care Physician Phone Number: [phone]         Discharge Diagnoses/Problem Oriented Hospital Course (Providers):    Hermilo Velasquez was admitted on 4/10/2022 by Nadeem Hernandez MD and I would refer you to their history and physical.  The following problems were addressed during his hospitalization:    Hermilo Velasquez is a markedly pleasant 89 year old gentleman former smoker with extensive past medical history that is most notable for chronic respiratory failure due to COPD on oxygen, aortic stenosis status post AVR and bioprosthetic valve replacement, chronic paroxysmal atrial fibrillation and recurrent PE on Apixaban, prior lung cancer, and prior Non-Hodgkins Lymphoma, among multiple others; who presents with productive cough and weakness and is found to have suspected severe sepsis due to right upper lobe and lingular pneumonia, as well as acute anemia.        Severe sepsis due to right upper lobe and lingular pneumonia: We note that he has a history of Stage 1 lung adenocarcinoma status post right lower lobe wedge resection in 2019. He also has COPD and uses oxygen continuously 2 liters by nasal cannula at home. He has had multiple prior episodes of pneumonia. Most recently he was hospitalized here 4/2021 for severe sepsis due to left lower lobe pneumonia, treated successfully with cephalosporin and azithromycin. Now he presents with acute productive cough with generalized weakness. In the ED he is found to be hypotensive and hypoxic as low as 85% on room air. He has leukocytosis and elevated Lactate. CXR and CT of chest, abdomen  and pelvis are most notable for pulmonary opacifications in the right upper lobe bordering the lingula. It also showed a small right sided pleural effusion, changes consistent with emphysema, and suspected lung scarring. Overall, we suspect severe sepsis due to mult-lobar pneumonia. He could be at risk for pseudomonas. He also has modest pyuria on UA but denies dysuria and I think UTI may be less likely. His BP and Lactate have normalized with appropriate IV fluid intervention in the ED.    -- Continue empiric antibiotics: Cefepime and Azithromycin while inpatient and to finish azithromycin at time of discharge for 5 day course    -- Follow up cultures  Pro-calcitonin in 1.01. Has been downtrending for last 3 weeks.    -- Duonebs scheduled q4 hours, Albuterol nebs every 2 hours as needed.  No evidence of wheezing or worsening hypoxia.      -- Resume home inhalers including Spiriva, Breo Elipta when verified. oxygenation, currently above 90% on chronic 2 L NC. Robitussin ordered as needed for cough.     -- Last, we note that his last chest CT was in 2020 and also showed right upper lobe opacification at that time. He will likely need close outpatient follow up after discharge with repeat imaging to confirm resolution of infiltrate, otherwise further evaluation for alternative etiology may be needed     Acute on chronic anemia: He has a history of iron deficiency anemia. It seems he was hospitalized here 10/2020 for acute on chronic blood loss anemia and seen by ALESHIA Bliss and endoscopy tests revealed colonic AVM's which were coagulated.He also had diverticulosis seen on the endoscopy. In 8/2020 he had been found to have gastric AVM's.His most recent EGD from 10/2020 showed no acute abnormalities. During his last hospitalization 4/2021 he had concomitant severe acute anemia to HGB 6; he received transfusion and supportive acre and the HGB stabilized; he had no active bleeding seen and he declined repeat endoscopy  tests. Now tonight he again has recurrent acute on chronic anemia, down to HGB 8 from prior 12, again without evident active bleeding, at least as of yet; he could have chronic occult GI bleeding in the setting of concomitant DOAC use, especially concerning in the setting of hypotension on presentation tonight.     -- Repeat HGB 12.2 PTA to 8 >> 6.8, proceed with 1 unit of PRBC    -- consented and signed patient given 1 unit with hgb 6.8 > 8    -- [facility] consulted and underwent EGD (erosive gastropathy but no stigmata of bleed)    -- Colonoscopy showed diverticulosis and multiple AVM but no source of bleed, they were treated    -- plan for outpatient videocapsule study    -- resume Apixaban for now     -- We note that he has hematuria on UA but the CT was negative for nephrolithiasis     FRED: On CKD Stage 3; Suspect due to hypovolemia. IVF ordered as above. Repeat Creatinine in AM.               Recent Labs   Lab Test 04/10/22  2034 09/08/21  1459 04/15/21  0813   CR 1.59* 1.24 0.98   CR improved 1.59 to 1.28 to 0.97        Aortic stenosis: We note that he has a history of TAVR and bioprosthetic valve replacement for Aortic Stenosis in 2015; coronary angiogram at that time was negative for obstructive CAD. TTE 4/2021 showed preserved LVEF with mild to moderate prosthetic valve stenosis. Noted     PAF, chronic:     -- Telemetry     -- Resume Toprol with resolution of hypotension; resume apixaban     Recurrent PE: Noted, resume anticoagulation     Dyslipidemia: Resume Lipitor      Stage 4 Non-Hodgkins Lymphoma and MGUS: Followed by Dr. Zurita of [facility]. His lymphoma was diagnosed and treated with systemic chemotherapy in 2012 and reportedly has been in remission since. His MGUS is being monitored as an outpatient.    -- Noted     Chronic peripheral neuropathy:     --  B12 level is 431 and adequate    -- resume Cymbalta       Bullous pemphigoid and chronic pruritus: By history; noted.     AAA: 3.5 cm, seen  on CT; close outpatient follow up will be recommended at discharge     Suspected severe protein calorie malnutrition: Nutrition services consulted.     Rule Out COVID-19 infection  This patient was evaluated during a global COVID-19 pandemic, which necessitated consideration that the patient might be at risk for infection with the SARS-CoV-2 virus that causes COVID-19. Applicable protocols for evaluation were followed during the patient's care.   -negative COVID-19 PCR test result  -no current indication for precautions            Code Status:      Full Code         Important Results:      NAD  HEENT NCAT  PULM CTA  CV RRR  GI SOFT +BS  MS NO EDEMA  NEURO NON FOCAL         Pending Results:        Unresulted Labs Ordered in the Past 30 Days of this Admission     Date and Time Order Name Status Description    4/10/2022 10:47 PM Urine Culture Preliminary     4/10/2022  8:22 PM Blood Culture Line, venous Preliminary     4/10/2022  8:22 PM Blood Culture Peripheral Blood Preliminary                Discharge Instructions and Follow-Up:      -- follow up with GI for videocapsule as outpatient  -- follow up with PCP in 10-14 days to follow up on pulmonary infiltrates         Discharge Disposition:      Discharged to home         Discharge Medications:        Current Discharge Medication List      START taking these medications    Details   azithromycin (ZITHROMAX) 250 MG tablet Take 1 tablet (250 mg) by mouth in the morning for 3 days.  Qty: 3 tablet, Refills: 0    Associated Diagnoses: Chronic obstructive pulmonary disease, unspecified COPD type (H)         CONTINUE these medications which have CHANGED    Details   !! famotidine (PEPCID) 20 MG tablet Take 1 tablet (20 mg) by mouth 2 times daily    Associated Diagnoses: Gastrointestinal hemorrhage, unspecified gastrointestinal hemorrhage type      !! fluticasone-vilanterol (BREO ELLIPTA) 200-25 MCG/INH inhaler Inhale 1 puff into the lungs daily    Associated Diagnoses:  Chronic obstructive pulmonary disease, unspecified COPD type (H)       !! - Potential duplicate medications found. Please discuss with provider.      CONTINUE these medications which have NOT CHANGED    Details   albuterol (PROAIR HFA/PROVENTIL HFA/VENTOLIN HFA) 108 (90 Base) MCG/ACT inhaler Inhale 1-2 puffs into the lungs every 6 hours as needed for shortness of breath / dyspnea or wheezing  Qty: 3 Inhaler, Refills: 5    Comments: Pharmacy may dispense brand covered by insurance (Proair, or proventil or ventolin or generic albuterol inhaler)  Associated Diagnoses: Chronic obstructive pulmonary disease, unspecified COPD type (H)      albuterol (PROVENTIL) (2.5 MG/3ML) 0.083% neb solution Take 1 vial (2.5 mg) by nebulization every 6 hours as needed for shortness of breath / dyspnea or wheezing  Qty: 180 mL, Refills: 11    Associated Diagnoses: Chronic obstructive pulmonary disease, unspecified COPD type (H)      apixaban ANTICOAGULANT (ELIQUIS) 5 MG tablet Take 5 mg by mouth in the morning and 5 mg in the evening.      atorvastatin (LIPITOR) 10 MG tablet Take 1 tablet (10 mg) by mouth daily  Qty: 90 tablet, Refills: 2    Associated Diagnoses: Mixed hyperlipidemia      DULoxetine (CYMBALTA) 60 MG capsule Take 60 mg by mouth daily      !! famotidine (PEPCID) 20 MG tablet Take 20 mg by mouth in the morning.      ferrous sulfate (FE TABS) 325 (65 Fe) MG EC tablet Take 1 tablet (325 mg) by mouth daily  Qty: 90 tablet, Refills: 3    Associated Diagnoses: Iron deficiency anemia, unspecified iron deficiency anemia type; Anemia due to blood loss, acute      !! fluticasone-vilanterol (BREO ELLIPTA) 200-25 MCG/INH inhaler Inhale 1 puff into the lungs daily      gabapentin (NEURONTIN) 300 MG capsule Take 600 mg by mouth At Bedtime      metoprolol succinate ER (TOPROL-XL) 25 MG 24 hr tablet Take 0.5 tablets (12.5 mg) by mouth daily  Qty: 45 tablet, Refills: 0    Associated Diagnoses: Benign essential hypertension     "  triamcinolone (KENALOG) 0.1 % external cream Apply topically 2 times daily as needed for irritation  Qty: 453.6 g, Refills: 3    Associated Diagnoses: Chronic pruritus      ACE/ARB/ARNI NOT PRESCRIBED (INTENTIONAL) Please choose reason not prescribed from choices below.    Associated Diagnoses: Chronic diastolic congestive heart failure (H)       !! - Potential duplicate medications found. Please discuss with provider.      STOP taking these medications       hydrOXYzine (VISTARIL) 50 MG capsule Comments:   Reason for Stopping:                    Allergies:         Allergies   Allergen Reactions     Lidocaine Blisters     Allergy to lidocaine ointment     Omeprazole Itching     Pantoprazole Itching     Prevacid [Lansoprazole] Itching     Lasix [Furosemide] Rash     Penicillins Rash     \"broke out from injection\" 60 yrs ago  Tolerates cephalosporins            Consultations This Hospital Stay:      Consultation during this admission received from gastroenterology           Discharge Time:       Greater than 30 minutes.        Image Results From This Hospital Stay (For Non-EPIC Providers):        Results for orders placed or performed during the hospital encounter of 04/10/22   XR Chest Port 1 View    Narrative    EXAM: XR CHEST PORT 1 VIEW  LOCATION: [facility]  DATE/TIME: 4/10/2022 8:37 PM    INDICATION: Sepsis.  COMPARISON: 06/23/2021 CT chest and 04/13/2021 CXR.      Impression    IMPRESSION: Sternotomy. AVR. CABG. Markedly shallow inspiration exaggerates heart size which is within normal limits and unchanged. Pulmonary vascularity within normal limits. Strands of fibrosis in the lower lungs unchanged. Lungs otherwise clear. No   visible pleural fluid. Calcified tortuous thoracic aorta. Bones are demineralized.   CT Chest Abdomen Pelvis w/o Contrast    Narrative    EXAM: CT CHEST ABDOMEN PELVIS W/O CONTRAST  LOCATION: [facility]  DATE/TIME: 4/10/2022 10:15 " PM    INDICATION: Sepsis.  COMPARISON: CT chest dated 06/23/2021 and CT abdomen and pelvis dated 04/13/2013.  TECHNIQUE: CT scan of the chest, abdomen, and pelvis was performed without IV contrast. Multiplanar reformats were obtained. Dose reduction techniques were used.   CONTRAST: None.    FINDINGS:   LUNGS AND PLEURA: Small right pleural effusion. Post surgical changes of the right lower lobe. Moderate biapical emphysematous change. Atelectasis versus scarring of the right upper lobe and lingula, correlate to exclude superimposed infection.    MEDIASTINUM/AXILLAE: Normal size heart. No pericardial effusion. No hilar or mediastinal lymphadenopathy. Prosthetic aortic valve.    CORONARY ARTERY CALCIFICATION: Moderate.    HEPATOBILIARY: Normal.    PANCREAS: Normal.    SPLEEN: Normal.    ADRENAL GLANDS: Normal.    KIDNEYS/BLADDER: Normal.    BOWEL: Normal.    LYMPH NODES: Normal.    VASCULATURE: 3.5 cm saccular, infrarenal abdominal aortic aneurysm seen arising from the left lateral inferior abdominal aorta. Moderate atherosclerotic vascular calcifications.    PELVIC ORGANS: Normal.    MUSCULOSKELETAL: Moderate to severe multilevel discogenic degenerative change with ankylosed thoracolumbar spine.      Impression    IMPRESSION:  1.  Small right pleural effusion.  2.  Bilateral discoid atelectasis, correlate to exclude superimposed infection.  3.  Moderate emphysema.  4.  Mildly enlarged heart with aortic valve replacement and coronary artery disease and atherosclerotic vascular disease.  5.  3.5 cm saccular infrarenal abdominal aortic aneurysm.  6.  No obstruction, colitis, diverticulitis, or appendicitis.  7.  No obstructing renal or ureteral stones. No hydroureteronephrosis.     *Note: Due to a large number of results and/or encounters for the requested time period, some results have not been displayed. A complete set of results can be found in Results Review.           Most Recent Lab Results In EPIC (For Non-EPIC  Providers):    Most Recent 3 CBC's:  Recent Labs   Lab Test 04/12/22  0754 04/11/22  0744 04/10/22  2034   WBC 9.1 11.6* 17.1*   HGB 8.0* 6.8* 8.0*   MCV 89 91 91    329 401      Most Recent 3 BMP's:  Recent Labs   Lab Test 04/12/22  0754 04/11/22  0744 04/10/22  2034    139 138   POTASSIUM 4.3 4.2 4.5   CHLORIDE 109 111* 107   CO2 26 25 26   BUN 17 27 29   CR 0.97 1.28* 1.59*   ANIONGAP 3 3 5   AMRITA 8.6 8.0* 8.4*   * 103* 134*     Most Recent 3 Troponin's:  Recent Labs   Lab Test 04/14/21  0506 04/14/21  0039 04/13/21 2038   TROPI 0.513* 0.529* 0.331*     Most Recent 3 INR's:  Recent Labs   Lab Test 10/23/20  1617 08/17/20  0948 08/16/20  0829   INR 1.39* 1.21* 1.30*     Most Recent 2 LFT's:  Recent Labs   Lab Test 04/11/22  0744 04/10/22  2034   AST 17 11   ALT 10 10   ALKPHOS 78 91   BILITOTAL 0.3 0.5     Most Recent Cholesterol Panel:  Recent Labs   Lab Test 11/30/21  1123   CHOL 138   LDL 71   HDL 55   TRIG 58     Most Recent 6 Bacteria Isolates From Any Culture (See EPIC Reports for Culture Details):  Recent Labs   Lab Test 04/14/21  0815 04/13/21 2054 04/13/21 2038 09/13/20  1329 09/13/20  1256 08/15/20  1451   CULT Canceled, Test credited  >10 Squamous epithelial cells/low power field indicates oral contamination. Please   recollect.  *  Notification of test cancellation was given to  Elva Ospina RN [id number] 4/14/20 by AM   No growth No growth No growth No growth No growth  No growth     Most Recent TSH, T4 and HgbA1c:  Recent Labs   Lab Test 09/08/21  1459 07/31/18  1104 02/21/18  1124   TSH 2.46 2.88 2.38   T4  --   --  1.21   A1C  --  5.8*  --

## 2022-04-13 ENCOUNTER — TRANSFERRED RECORDS (OUTPATIENT)
Dept: HEALTH INFORMATION MANAGEMENT | Facility: CLINIC | Age: 87
End: 2022-04-13
Payer: COMMERCIAL

## 2022-04-13 ENCOUNTER — APPOINTMENT (OUTPATIENT)
Dept: PHYSICAL THERAPY | Facility: CLINIC | Age: 87
DRG: 871 | End: 2022-04-13
Attending: INTERNAL MEDICINE
Payer: COMMERCIAL

## 2022-04-13 VITALS
SYSTOLIC BLOOD PRESSURE: 122 MMHG | HEART RATE: 84 BPM | TEMPERATURE: 98.3 F | OXYGEN SATURATION: 94 % | HEIGHT: 70 IN | WEIGHT: 166.67 LBS | DIASTOLIC BLOOD PRESSURE: 64 MMHG | RESPIRATION RATE: 18 BRPM | BODY MASS INDEX: 23.86 KG/M2

## 2022-04-13 LAB
BACTERIA UR CULT: NO GROWTH
HGB BLD-MCNC: 8.6 G/DL (ref 13.3–17.7)

## 2022-04-13 PROCEDURE — 94640 AIRWAY INHALATION TREATMENT: CPT | Mod: 76

## 2022-04-13 PROCEDURE — 250N000013 HC RX MED GY IP 250 OP 250 PS 637: Performed by: HOSPITALIST

## 2022-04-13 PROCEDURE — 97116 GAIT TRAINING THERAPY: CPT | Mod: GP

## 2022-04-13 PROCEDURE — 250N000009 HC RX 250: Performed by: HOSPITALIST

## 2022-04-13 PROCEDURE — 36415 COLL VENOUS BLD VENIPUNCTURE: CPT | Performed by: PHYSICIAN ASSISTANT

## 2022-04-13 PROCEDURE — 999N000157 HC STATISTIC RCP TIME EA 10 MIN

## 2022-04-13 PROCEDURE — 94640 AIRWAY INHALATION TREATMENT: CPT

## 2022-04-13 PROCEDURE — 97530 THERAPEUTIC ACTIVITIES: CPT | Mod: GP

## 2022-04-13 PROCEDURE — 250N000013 HC RX MED GY IP 250 OP 250 PS 637: Performed by: INTERNAL MEDICINE

## 2022-04-13 PROCEDURE — 250N000011 HC RX IP 250 OP 636: Performed by: HOSPITALIST

## 2022-04-13 PROCEDURE — 99207 PR NO CHARGE LOS: CPT | Performed by: INTERNAL MEDICINE

## 2022-04-13 PROCEDURE — 85018 HEMOGLOBIN: CPT | Performed by: PHYSICIAN ASSISTANT

## 2022-04-13 PROCEDURE — 97161 PT EVAL LOW COMPLEX 20 MIN: CPT | Mod: GP

## 2022-04-13 RX ADMIN — CEFEPIME HYDROCHLORIDE 2 G: 2 INJECTION, POWDER, FOR SOLUTION INTRAVENOUS at 05:56

## 2022-04-13 RX ADMIN — DULOXETINE 60 MG: 60 CAPSULE, DELAYED RELEASE ORAL at 09:10

## 2022-04-13 RX ADMIN — METOPROLOL SUCCINATE 12.5 MG: 25 TABLET, EXTENDED RELEASE ORAL at 09:10

## 2022-04-13 RX ADMIN — FAMOTIDINE 20 MG: 20 TABLET ORAL at 09:10

## 2022-04-13 RX ADMIN — ATORVASTATIN CALCIUM 10 MG: 10 TABLET, FILM COATED ORAL at 09:10

## 2022-04-13 RX ADMIN — IPRATROPIUM BROMIDE AND ALBUTEROL SULFATE 3 ML: .5; 3 SOLUTION RESPIRATORY (INHALATION) at 07:54

## 2022-04-13 ASSESSMENT — ACTIVITIES OF DAILY LIVING (ADL)
ADLS_ACUITY_SCORE: 11

## 2022-04-13 NOTE — PLAN OF CARE
Pt VSS on 2L O2. Pt intermittently removes O2.  O2 sats drop to 84 with exertion. Pt A/O x3. SBA GB/W. IV removed. Pt refused tele.

## 2022-04-13 NOTE — PROGRESS NOTES
[facility]  Gastroenterology Progress Note     Hermilo Velasquez MRN# [id number]   YOB: 1932 Age: 89 year old          Assessment and Plan:     Hermilo Olson is a 89 year old male with acute on chronic anemia, admitted on 4/10 with sepsis likely due to pneumonia with anemia.      Acute on chronic anemia  Prior GI bleed due to AVMs  Chronically anticoagulated on Eliquis  Hemoglobin baseline is 12.2 and now stable around 8.  He has had no signs of active bleeding.   Previous EGD in 08/2020 noted non bleeding angioectasias.   He had a colonoscopy in 10/2020 noted sex recently bleeding colonic angiodysplastic lesions- treated with APC. One 10 mm polyp removed in sigmoid colon.   Has been anticoagulated on apixiban and currently held  Cause of anemia may be multifactorial, being treated for sepsis from pneumonia vs slow upper or lower GI bleed  4/11 EGD unremarkable with no signs of bleeding  4/12 Colonoscopy noted multiple non bleeding angiodysplastic lesions- treated with APC. Noted diverticulosis. Stool in entire colon.      -- Mechanical soft diet  -- Repeat hemoglobin this am.  -- Outpatient capsule endoscopy  -- Ok with GI to restart apixiban or other AC   -- Ok with GI to discharge patient with close outpatient f.u in 1-2 weeks            Interval History:     no new complaints, doing well, doing well; no cp, sob, n/v/d, or abd pain.              Review of Systems:     C: NEGATIVE for fever, chills, change in weight  E/M: NEGATIVE for ear, mouth and throat problems  R: NEGATIVE for significant cough or SOB  CV: NEGATIVE for chest pain, palpitations or peripheral edema             Medications:   I have reviewed this patient's current medications    atorvastatin  10 mg Oral Daily     azithromycin  250 mg Intravenous Q24H     ceFEPIme (MAXIPIME) IV  2 g Intravenous Q12H     DULoxetine  60 mg Oral Daily     famotidine  20 mg Oral BID     fluticasone-vilanterol  1 puff  Inhalation Daily     gabapentin  600 mg Oral At Bedtime     ipratropium - albuterol 0.5 mg/2.5 mg/3 mL  3 mL Nebulization Q4H While awake     metoprolol succinate ER  12.5 mg Oral Daily     sodium chloride (PF)  3 mL Intracatheter Q8H                  Physical Exam:   Vitals were reviewed  Vital Signs with Ranges  Temp:  [97.3  F (36.3  C)-98.1  F (36.7  C)] 97.6  F (36.4  C)  Pulse:  [53-90] 80  Resp:  [0-27] 18  BP: (108-138)/(49-80) 138/61  SpO2:  [84 %-100 %] 94 %  I/O last 3 completed shifts:  In: -   Out: 150 [Urine:150]  Constitutional: healthy, alert, and no distress   Cardiovascular: negative, PMI normal. No lifts, heaves, or thrills. RRR. No murmurs, clicks gallops or rub  Respiratory: negative, Percussion normal. Good diaphragmatic excursion. Lungs clear  Abdomen: Abdomen soft, non-tender. BS normal. No masses, organomegaly           Data:   I reviewed the patient's new clinical lab test results.   Recent Labs   Lab Test 04/12/22  0754 04/11/22  0744 04/10/22  2034 10/24/20  0037 10/23/20  1617 08/18/20  0829 08/17/20  0948 08/16/20  0829   WBC 9.1 11.6* 17.1*   < > 8.0   < > 8.9 9.5   HGB 8.0* 6.8* 8.0*   < > 6.6*   < > 7.7* 7.5*   MCV 89 91 91   < > 89   < > 82 81    329 401   < > 279   < > 273 272   INR  --   --   --   --  1.39*  --  1.21* 1.30*    < > = values in this interval not displayed.     Recent Labs   Lab Test 04/12/22  0754 04/11/22  0744 04/10/22  2034   POTASSIUM 4.3 4.2 4.5   CHLORIDE 109 111* 107   CO2 26 25 26   BUN 17 27 29   ANIONGAP 3 3 5     Recent Labs   Lab Test 04/11/22  0744 04/10/22  2236 04/10/22  2034 09/08/21  1459 08/16/20  0829 08/16/20  0400 03/23/20  0459 03/22/20  1950 03/22/20  1914   ALBUMIN 2.2*  --  2.6* 3.1*   < >  --    < >  --  2.6*   BILITOTAL 0.3  --  0.5 0.6   < >  --    < >  --  0.6   ALT 10  --  10 12   < >  --    < >  --  27   AST 17  --  11 10   < >  --    < >  --  101*   PROTEIN  --  30 *  --   --   --  Negative  --  10*  --    LIPASE  --   --   --    --   --   --   --   --  54*    < > = values in this interval not displayed.       I reviewed the patient's new imaging results.    All laboratory data reviewed  All imaging studies reviewed by me.    Sammie Cox PA-C,  4/12/2022  [facility]  Office : [phone]  Cell: [phone] (Dr. Florentino)  Cell: [phone] (Sammie Cox PA-C)

## 2022-04-13 NOTE — PLAN OF CARE
Patient is alert and oriented X3 with confusion. VSS on 2 LPM via NC. Up with assist of 1 with walker. Patient denies pain. PIV saline locked. On TELE. Patient removed TELE leads X2 , charge nurse updated. The writer educated the patient, patient verbalized understanding and still refused. Refused bed time medication. Unable to completed the skin assessment, patient refused the back side  to be assessed. Uses urinal to  void at bedsides. Will continue to monitor.

## 2022-04-13 NOTE — CONSULTS
Care Management Initial Consult    General Information  Assessment completed with: Patient, Spouse or significant other,  (patient in room and wife on phone)  Type of CM/SW Visit: Initial Assessment    Primary Care Provider verified and updated as needed: Yes   Readmission within the last 30 days:        Reason for Consult: discharge planning  Advance Care Planning:            Communication Assessment  Patient's communication style: spoken language (English or Bilingual)    Hearing Difficulty or Deaf: no   Wear Glasses or Blind: yes    Cognitive  Cognitive/Neuro/Behavioral: .WDL except, orientation  Level of Consciousness: alert  Arousal Level: arouses to voice, arouses to touch/gentle shaking, opens eyes spontaneously  Orientation: disoriented to, situation  Mood/Behavior: calm, cooperative  Best Language: 0 - No aphasia  Speech: clear    Living Environment:   People in home: spouse     Current living Arrangements: house      Able to return to prior arrangements: yes       Family/Social Support:  Care provided by: self, spouse/significant other  Provides care for:    Marital Status:   Wife          Description of Support System: Supportive    Support Assessment: Adequate family and caregiver support    Current Resources:   Patient receiving home care services:       Community Resources:    Equipment currently used at home: cane, straight, shower chair, grab bar, tub/shower, walker, rolling  Supplies currently used at home:      Employment/Financial:  Employment Status:          Financial Concerns:             Lifestyle & Psychosocial Needs:  Social Determinants of Health     Tobacco Use: Medium Risk     Smoking Tobacco Use: Former Smoker     Smokeless Tobacco Use: Never Used   Alcohol Use: Not on file   Financial Resource Strain: Low Risk      Difficulty of Paying Living Expenses: Not hard at all   Food Insecurity: No Food Insecurity     Worried About Running Out of Food in the Last Year: Never true     Ran  Out of Food in the Last Year: Never true   Transportation Needs: No Transportation Needs     Lack of Transportation (Medical): No     Lack of Transportation (Non-Medical): No   Physical Activity: Not on file   Stress: Not on file   Social Connections: Not on file   Intimate Partner Violence: Not At Risk     Fear of Current or Ex-Partner: No     Emotionally Abused: No     Physically Abused: No     Sexually Abused: No   Depression: Not at risk     PHQ-2 Score: 0   Housing Stability: Not on file       Functional Status:  Prior to admission patient needed assistance:              Mental Health Status:          Chemical Dependency Status:                Values/Beliefs:  Spiritual, Cultural Beliefs, [employer] Practices, Values that affect care:                 Additional Information:  Writer met with patient at bedside today and introduced self androle in discharge planning.  PT is recommending home PT.  Patient is a bit reluctant and states his wife will not want anyone coming to their home.  Writer asked permission to call his wife Roberta and he is okay with that.  Writer called Roberta and explained PT is recommending home PT and they will come a couple times per week.  Roberta is okay with that and wanted to know what time to pick Hermilo up today.  Writer spoke with bedside RN regarding ride time which she suggsted Roberta come to pts room at 5 PM and RN can go over instructions with both of them.  Writer made appointments for PCP and GI follow up.    Dipti Csae RN, BSN  [facility]  Inpatient Care Coordinator  M: [phone]    Dipti Case RN

## 2022-04-13 NOTE — PROGRESS NOTES
Documentation of Face to Face and Certification for Home Health Services    I certify that patient: Hermilo Velasquez is under my care and that I, or a nurse practitioner or physician's assistant working with me, had a face-to-face encounter that meets the physician face-to-face encounter requirements with this patient on: 4/13/2022.    This encounter with the patient was in whole, or in part, for the following medical condition, which is the primary reason for home health care: Pna and anemia.    I certify that, based on my findings, the following services are medically necessary home health services: Physical Therapy.    My clinical findings support the need for the above services because: Physical Therapy Services are needed to assess and treat the following functional impairments: deconditioning, severe anemia.    Further, I certify that my clinical findings support that this patient is homebound (i.e. absences from home require considerable and taxing effort and are for medical reasons or [facility]ars[facility] services or infrequently or of short duration when for other reasons) because: Leaving home is medically contraindicated for the following reason(s): Dyspnea on exertion that makes it so they cannot leave their home for needed services without clinical deterioration...    Based on the above findings. I certify that this patient is confined to the home and needs intermittent skilled nursing care, physical therapy and/or speech therapy.  The patient is under my care, and I have initiated the establishment of the plan of care.  This patient will be followed by a physician who will periodically review the plan of care.  Physician/Provider to provide follow up care: Karson Bishop    Attending hospital physician [facility] En Malagon MD  Physician Signature: See electronic signature associated with these discharge orders.  Date: 4/13/2022

## 2022-04-13 NOTE — PROGRESS NOTES
04/13/22 1350   Quick Adds   Type of Visit Initial PT Evaluation   Living Environment   People in Home spouse   Current Living Arrangements house   Home Accessibility stairs to enter home;stairs within home   Number of Stairs, Main Entrance 2   Stair Railings, Main Entrance railing on right side (ascending)   Number of Stairs, Within Home, Primary greater than 10 stairs   Stair Railings, Within Home, Primary railing on left side (ascending)   Transportation Anticipated family or friend will provide   Living Environment Comments Spouse will be home to help out as needed.   Self-Care   Usual Activity Tolerance good   Current Activity Tolerance fair   Equipment Currently Used at Home cane, straight;shower chair;grab bar, tub/shower;walker, rolling   Fall history within last six months yes   Number of times patient has fallen within last six months 1   Activity/Exercise/Self-Care Comment Pt ind with ADLs and IADLs at baseline, uses cane for ambulation and walker intermittently. Wife does the grocery shopping. Pt reports using 2LPM of O2 at night and during the day if needed. Pt has a pulse ox to monitor O2.   General Information   Onset of Illness/Injury or Date of Surgery 04/10/22   Referring Physician En Malagon MD   Patient/Family Therapy Goals Statement (PT) Wants to get home.   Pertinent History of Current Problem (include personal factors and/or comorbidities that impact the POC) 89 year old gentleman who presents with acute onset productive cough on 4/10/22 found to have severe sepsis due to R upper lobe pneumonia and anemia. 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   Existing Precautions/Restrictions cardiac;fall;oxygen therapy device and L/min  (2LPM O2.)   General Observations Pt very hard  of hearing, wears hearing aids at home that were not present.   Cognition   Affect/Mental Status (Cognition) WFL   Orientation Status (Cognition) oriented x 4   Pain Assessment   Patient Currently in Pain No   Integumentary/Edema   Integumentary/Edema Comments Red rash on back, otherwise WFL.   Posture    Posture Protracted shoulders;Forward head position;Kyphosis   Range of Motion (ROM)   ROM Comment Grossly WFL BUE and LE.   Strength (Manual Muscle Testing)   Strength (Manual Muscle Testing) Able to perform R SLR;Able to perform L SLR   Strength Comments Gorssly antigravity strength with functional transfers. Global weakness.   Bed Mobility   Comment, (Bed Mobility) Supine HOB flat<>sitting EOB, ind, increased time and effort.   Transfers   Comment, (Transfers) Sit<>Stand to FWW, SBA.   Gait/Stairs (Locomotion)   Distance in Feet (Required for LE Total Joints) 5' eval + 200' treatment   Comment, (Gait/Stairs) Amb with FWW, step through pattern, SBA.   Balance   Balance Comments able to maintain seated balance unsupported. Able to maintain standing balance in FWW, SBA.   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   Clinical Impression   Criteria for Skilled Therapeutic Intervention Yes, treatment indicated   PT Diagnosis (PT) Impaired gait   Influenced by the following impairments decreased activity tolerance, decreased functional strength, impaired balance   Functional limitations due to impairments fall risk, impaired functional independence, impaired ADLs and IADLs.   Clinical Presentation (PT Evaluation Complexity) Stable/Uncomplicated   Clinical Presentation Rationale per MR and clinical judgement.   Clinical Decision Making (Complexity) low complexity   Planned Therapy Interventions (PT) balance training;bed mobility training;gait training;home exercise program;ROM (range of motion);stair training;strengthening;patient/family education;stretching;transfer  training;progressive activity/exercise   Risk & Benefits of therapy have been explained evaluation/treatment results reviewed;care plan/treatment goals reviewed;risks/benefits reviewed;current/potential barriers reviewed;participants voiced agreement with care plan;participants included;patient   PT Discharge Planning   PT Discharge Recommendation (DC Rec) home with home care physical therapy;home with assist;Leaving home requires significant assistance;Leaving home requires significant taxing effort   PT Rationale for DC Rec Pt is below baseline functional activity tolerance and strength. Pt was using cane for ambulation and now requires use of walker and oxygen. Pt very adament about returning home. Anticipate pt may be able to discharge home with assist as needed from spouse on stairs, use of walker for ambulation, 24H supervision. Recommend home PT to progress functional strength, independence, and activity tolerance. Pt was not totally on board with home PT.   PT Brief overview of current status bed mobility, ind. SBA transfers to FWW. Amb with FWW, SBA. On 2LPM O2 throughout session.   Total Evaluation Time   Total Evaluation Time (Minutes) 10   Physical Therapy Goals   PT Frequency One time eval and treatment only   PT Predicted Duration/Target Date for Goal Attainment 04/13/22   PT Goals Bed Mobility;Transfers;Gait;Stairs   PT: Bed Mobility Independent;Supine to/from sit;Rolling;Bridging;Goal Met   PT: Transfers Supervision/stand-by assist;Sit to/from stand;Bed to/from chair;Assistive device   PT: Gait Modified independent;Greater than 200 feet;Assistive device;Rolling walker   PT: Stairs Supervision/stand-by assist;4 stairs;Rail on right

## 2022-04-13 NOTE — PROVIDER NOTIFICATION
Paged hospitalist if we're going to collect labs for respiratory anaerobic bacterial culture and gram stain ordered last 4-11. Also for PT consult d/t pt weakness    11:45 order for PT eval

## 2022-04-13 NOTE — PROGRESS NOTES
Care Management Discharge Note    Discharge Date: 04/13/2022       Discharge Disposition: Home Care    Discharge Services:      Discharge DME: Oxygen, Walker    Discharge Transportation: family or friend will provide    Private pay costs discussed: Not applicable    PAS Confirmation Code:    Patient/family educated on Medicare website which has current facility and service quality ratings:      Education Provided on the Discharge Plan:    Persons Notified of Discharge Plans: Patient, bedside RN and pts wife Roberta  Patient/Family in Agreement with the Plan: yes    Handoff Referral Completed: Yes    Additional Information:  Writer arranged for [facility].  Orders were faxed to  [phone].  GI and PCP appointments were made and listed on AVS.  Wife will com to patients room at 5 PM to review discharge instructions with pt and RN        Dipti Case RN

## 2022-04-13 NOTE — PROGRESS NOTES
Pt stable. Discharge instructions given and explained to pt and wife. Medication given to pt.  Pt has all belongings with him.

## 2022-04-13 NOTE — PLAN OF CARE
[id number]    Pt is A&Ox3; disoriented to situation. VSS. Pt refused to allow writer to put TELE back on. Charge RN aware. Pt is on 3L of O2 via NC. Saline locked. Pt is voiding adequately with bedside urinal.

## 2022-04-14 ENCOUNTER — PATIENT OUTREACH (OUTPATIENT)
Dept: CARE COORDINATION | Facility: CLINIC | Age: 87
End: 2022-04-14
Payer: COMMERCIAL

## 2022-04-14 DIAGNOSIS — Z71.89 OTHER SPECIFIED COUNSELING: ICD-10-CM

## 2022-04-14 NOTE — PROGRESS NOTES
Clinic Care Coordination Contact  [facility]/Voicemail       Clinical Data: Care Coordinator Outreach  Outreach attempted x 1.  Left message on patient's voicemail with call back information and requested return call.  Plan: Care Coordinator will try to reach patient again in 1-2 business days.    .Galilea LINDSAY Community Health Worker  Clinic Care Coordination  [facility]  Phone: [phone]

## 2022-04-14 NOTE — PLAN OF CARE
Physical Therapy Discharge Summary    Reason for therapy discharge:    Discharged to home with home therapy.    Progress towards therapy goal(s). See goals on Care Plan in [facility] electronic health record for goal details.  Goals partially met.  Barriers to achieving goals:   discharge from facility.    Therapy recommendation(s):    Continued therapy is recommended.  Rationale/Recommendations:  Pt would benefit from continued skilled PT services via [facility] to improve activity tolerance, balance and strength.

## 2022-04-15 LAB
BACTERIA BLD CULT: NO GROWTH
BACTERIA BLD CULT: NO GROWTH

## 2022-04-15 NOTE — PROGRESS NOTES
"Clinic Care Coordination Contact  [facility]: Post-Discharge Note  SITUATION                                                      Admission:    Admission Date: 04/10/22   Reason for Admission: Generalized Weakness and Altered Mental Status  Discharge:   Discharge Date: 04/13/22  Discharge Diagnosis: Severe sepsis due to right upper lobe and lingular pneumonia    BACKGROUND                                                      Per hospital discharge summary and inpatient provider notes:Hermilo Velasquez was admitted on 4/10/2022 by Nadeem Hernandez MD and I would refer you to their history and physical.  The following problems were addressed during his hospitalization:     Hermilo Velasquez is a markedly pleasant 89 year old gentleman former smoker with extensive past medical history that is most notable for chronic respiratory failure due to COPD on oxygen, aortic stenosis status post AVR and bioprosthetic valve replacement, chronic paroxysmal atrial fibrillation and recurrent PE on Apixaban, prior lung cancer, and prior Non-Hodgkins Lymphoma, among multiple others; who presents with productive cough and weakness and is found to have suspected severe sepsis due to right upper lobe and lingular pneumonia, as well as acute anemia.      ASSESSMENT      Enrollment  Primary Care Care Coordination Status: Declined    Discharge Assessment  How are you doing now that you are home?: \" I'm getting better \"  How are your symptoms? (Red Flag symptoms escalate to triage hotline per guidelines): Improved  Do you feel your condition is stable enough to be safe at home until your provider visit?: Yes  Does the patient have their discharge instructions? : Yes  Does the patient have questions regarding their discharge instructions? : No  Were you started on any new medications or were there changes to any of your previous medications? : Yes  Does the patient have all of their medications?: Yes  Do you have questions " regarding any of your medications? : No  Do you have all of your needed medical supplies or equipment (DME)?  (i.e. oxygen tank, CPAP, cane, etc.): Yes  Discharge follow-up appointment scheduled within 14 calendar days? : Yes  Discharge Follow Up Appointment Date: 04/20/22  Discharge Follow Up Appointment Scheduled with?: Specialty Care Provider    Post-op ([facility]A Only)  If the patient had a surgery or procedure, do they have any questions for a nurse?: No             PLAN                                                      Outpatient Plan:follow up with GI for videocapsule as outpatient  -- follow up with PCP in 10-14 days to follow up on pulmonary infiltrates         Future Appointments   Date Time Provider Department Center   4/26/2022  3:00 PM Karson Bishop MD [facility]         For any urgent concerns, please contact our 24 hour nurse triage line: [phone] ([phone])         Ciara Huynh

## 2022-04-16 LAB
ATRIAL RATE - MUSE: 76 BPM
DIASTOLIC BLOOD PRESSURE - MUSE: NORMAL MMHG
INTERPRETATION ECG - MUSE: NORMAL
P AXIS - MUSE: 62 DEGREES
PR INTERVAL - MUSE: 196 MS
QRS DURATION - MUSE: 136 MS
QT - MUSE: 432 MS
QTC - MUSE: 486 MS
R AXIS - MUSE: 2 DEGREES
SYSTOLIC BLOOD PRESSURE - MUSE: NORMAL MMHG
T AXIS - MUSE: 63 DEGREES
VENTRICULAR RATE- MUSE: 76 BPM

## 2022-04-20 ENCOUNTER — TRANSFERRED RECORDS (OUTPATIENT)
Dept: HEALTH INFORMATION MANAGEMENT | Facility: CLINIC | Age: 87
End: 2022-04-20
Payer: COMMERCIAL

## 2022-04-26 ENCOUNTER — OFFICE VISIT (OUTPATIENT)
Dept: FAMILY MEDICINE | Facility: CLINIC | Age: 87
End: 2022-04-26
Payer: COMMERCIAL

## 2022-04-26 VITALS
TEMPERATURE: 97.7 F | HEART RATE: 77 BPM | DIASTOLIC BLOOD PRESSURE: 84 MMHG | WEIGHT: 164 LBS | RESPIRATION RATE: 16 BRPM | OXYGEN SATURATION: 92 % | SYSTOLIC BLOOD PRESSURE: 123 MMHG | BODY MASS INDEX: 23.48 KG/M2 | HEIGHT: 70 IN

## 2022-04-26 DIAGNOSIS — D64.9 ANEMIA, UNSPECIFIED TYPE: ICD-10-CM

## 2022-04-26 DIAGNOSIS — J18.9 PNEUMONIA DUE TO INFECTIOUS ORGANISM, UNSPECIFIED LATERALITY, UNSPECIFIED PART OF LUNG: Primary | ICD-10-CM

## 2022-04-26 DIAGNOSIS — Z23 HIGH PRIORITY FOR 2019-NCOV VACCINE: ICD-10-CM

## 2022-04-26 PROCEDURE — 99496 TRANSJ CARE MGMT HIGH F2F 7D: CPT | Performed by: INTERNAL MEDICINE

## 2022-04-26 PROCEDURE — 0054A COVID-19,PF,PFIZER (12+ YRS): CPT | Performed by: INTERNAL MEDICINE

## 2022-04-26 PROCEDURE — 91305 COVID-19,PF,PFIZER (12+ YRS): CPT | Performed by: INTERNAL MEDICINE

## 2022-04-26 ASSESSMENT — PAIN SCALES - GENERAL: PAINLEVEL: NO PAIN (0)

## 2022-04-26 NOTE — PROGRESS NOTES
Assessment & Plan     Pneumonia due to infectious organism, unspecified laterality, unspecified part of lung  He seems to be doing better from this  He completed his antibiotics    Anemia, unspecified type  Probably multifactorial from chronic kidney disease, chronic GI blood loss in the setting of anticoagulation due to history of recurrent venous thromboembolism.  He received intravenous iron from his hematology clinic apparently.  He is on oral iron.  He is pursuing a capsule study to look for small bowel source of GI blood loss with gastroenterology.    High priority for 2019-nCoV vaccine    - COVID-19,PF,PFIZER (12+ Yrs GRAY LABEL)    Unfortunately, he probably will not have much of a quality of life and less he has his knee fixed.  However, he really has a lot of complicated medical issues that would make surgery risky and challenging.  At this point, his quality of life is quite poor and he expressed to me that he would be willing to take risks to derive symptom relief in his knee.  He will discuss options with Dr. Deshpande tomorrow.  We would need to optimize his anemia before considering surgery.  I told him that he would at least need a few months to recover from his hospitalization and pneumonia before he could consider surgery.      22 minutes spent on the date of the encounter doing chart review, history and exam, documentation and further activities per the note            Return in about 1 month (around 5/26/2022) for recheck.  Patient instructed to return to clinic or contact us sooner if symptoms worsen or new symptoms develop.     Karson Bishop MD  [facility] RENETTA Akhtar is a 89 year old who presents for the following health issues  accompanied by his spouse.    HPI     Post Discharge Outreach 4/15/2022   Admission Date 4/10/2022   Reason for Admission Generalized Weakness and Altered Mental Status   Discharge Date 4/13/2022   Discharge Diagnosis Severe sepsis due  "to right upper lobe and lingular pneumonia   How are you doing now that you are home? \" I'm getting better \"   How are your symptoms? (Red Flag symptoms escalate to triage hotline per guidelines) Improved   Do you feel your condition is stable enough to be safe at home until your provider visit? Yes   Does the patient have their discharge instructions?  Yes   Does the patient have questions regarding their discharge instructions?  No   Were you started on any new medications or were there changes to any of your previous medications?  Yes   Does the patient have all of their medications? Yes   Do you have questions regarding any of your medications?  No   Do you have all of your needed medical supplies or equipment (DME)?  (i.e. oxygen tank, CPAP, cane, etc.) Yes   Discharge follow-up appointment scheduled within 14 calendar days?  Yes   Discharge Follow Up Appointment Date 4/20/2022   Discharge Follow Up Appointment Scheduled with? Specialty Care Provider     Hospital Follow-up Visit:    Hospital/Nursing Home/IP Rehab Facility: [facility]  Date of Admission: 4/10/22  Date of Discharge: 04/13/2022  Reason(s) for Admission: Pneumonia      Was your hospitalization related to COVID-19? No   Problems taking medications regularly:  None  Medication changes since discharge: None  Problems adhering to non-medication therapy:  None    Summary of hospitalization:  [facility] discharge summary reviewed  Diagnostic Tests/Treatments reviewed.  Follow up needed: none  Other Healthcare Providers Involved in Patient s Care:         None  Update since discharge: improved. Post Discharge Medication Reconciliation: discharge medications reconciled and changed, per note/orders.  Plan of care communicated with patient          It looks like he was in the hospital with pneumonia.  He feels much better now.  He denies cough or fever or shortness of breath.  He needed a blood transfusion " "apparently.  He had an EGD and colonoscopy.  Apparently, a capsule study is planned.  He is on oral iron.  He tells me he received an IV iron infusion by his hematology oncology clinic.  He plans to see Dr. Deshpande tomorrow to discuss knee surgery.  He is desperate for knee surgery as his pain is unbearable and the knee joint is very unstable.  He wonders how soon he can have knee surgery?      Review of Systems   Constitutional, HEENT, cardiovascular, pulmonary, gi and gu systems are negative, except as otherwise noted.      Objective    /84 (BP Location: Left arm, Cuff Size: Adult Large)   Pulse 77   Temp 97.7  F (36.5  C) (Tympanic)   Resp 16   Ht 1.778 m (5' 10\")   Wt 74.4 kg (164 lb)   SpO2 92%   BMI 23.53 kg/m    Body mass index is 23.53 kg/m .  Physical Exam   General: This is a surprisingly well-appearing man in no acute distress.  He speaks in full sentences and does not appear toxic.  Cardiovascular: The heart has a regular rate and rhythm.  Pulmonary: The lungs are clear to auscultation bilaterally, breathing is not labored, no dullness to percussion.  Extremities: Trace edema in the bilateral lower extremities noted symmetrically.  Neurological: Very hard of hearing, cranial nerves II to XII are otherwise grossly intact, he uses a cane.  Mental State: Appropriate mood and affect, well-groomed.                "

## 2022-04-27 ENCOUNTER — TRANSFERRED RECORDS (OUTPATIENT)
Dept: HEALTH INFORMATION MANAGEMENT | Facility: CLINIC | Age: 87
End: 2022-04-27
Payer: COMMERCIAL

## 2022-04-29 ENCOUNTER — TRANSFERRED RECORDS (OUTPATIENT)
Dept: HEALTH INFORMATION MANAGEMENT | Facility: CLINIC | Age: 87
End: 2022-04-29

## 2022-05-02 DIAGNOSIS — Z53.9 DIAGNOSIS NOT YET DEFINED: Primary | ICD-10-CM

## 2022-05-02 PROCEDURE — G0180 MD CERTIFICATION HHA PATIENT: HCPCS | Performed by: INTERNAL MEDICINE

## 2022-05-03 ENCOUNTER — MEDICAL CORRESPONDENCE (OUTPATIENT)
Dept: HEALTH INFORMATION MANAGEMENT | Facility: CLINIC | Age: 87
End: 2022-05-03
Payer: COMMERCIAL

## 2022-05-06 ENCOUNTER — TRANSFERRED RECORDS (OUTPATIENT)
Dept: HEALTH INFORMATION MANAGEMENT | Facility: CLINIC | Age: 87
End: 2022-05-06
Payer: COMMERCIAL

## 2022-05-06 ENCOUNTER — TELEPHONE (OUTPATIENT)
Dept: FAMILY MEDICINE | Facility: CLINIC | Age: 87
End: 2022-05-06
Payer: COMMERCIAL

## 2022-05-08 ENCOUNTER — HOSPITAL ENCOUNTER (INPATIENT)
Facility: CLINIC | Age: 87
LOS: 5 days | Discharge: NURSING, REHAB OR LONG-TERM CARE | DRG: 553 | End: 2022-05-13
Attending: EMERGENCY MEDICINE | Admitting: HOSPITALIST
Payer: COMMERCIAL

## 2022-05-08 ENCOUNTER — APPOINTMENT (OUTPATIENT)
Dept: GENERAL RADIOLOGY | Facility: CLINIC | Age: 87
DRG: 553 | End: 2022-05-08
Attending: EMERGENCY MEDICINE
Payer: COMMERCIAL

## 2022-05-08 ENCOUNTER — APPOINTMENT (OUTPATIENT)
Dept: CT IMAGING | Facility: CLINIC | Age: 87
DRG: 553 | End: 2022-05-08
Attending: EMERGENCY MEDICINE
Payer: COMMERCIAL

## 2022-05-08 DIAGNOSIS — Z79.01 ANTICOAGULATED: ICD-10-CM
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
## 2022-05-08 DIAGNOSIS — M25.00 HEMARTHROSIS: ICD-10-CM

## 2022-05-08 LAB
ALBUMIN UR-MCNC: 50 MG/DL
ANION GAP SERPL CALCULATED.3IONS-SCNC: 6 MMOL/L (ref 3–14)
APPEARANCE UR: ABNORMAL
BACTERIA #/AREA URNS HPF: ABNORMAL /HPF
BASOPHILS # BLD AUTO: 0 10E3/UL (ref 0–0.2)
BASOPHILS NFR BLD AUTO: 0 %
BILIRUB UR QL STRIP: NEGATIVE
BUN SERPL-MCNC: 24 MG/DL (ref 7–30)
CALCIUM SERPL-MCNC: 9.4 MG/DL (ref 8.5–10.1)
CAOX CRY #/AREA URNS HPF: ABNORMAL /HPF
CHLORIDE BLD-SCNC: 105 MMOL/L (ref 94–109)
CO2 SERPL-SCNC: 26 MMOL/L (ref 20–32)
COLOR UR AUTO: YELLOW
CREAT SERPL-MCNC: 1.13 MG/DL (ref 0.66–1.25)
EOSINOPHIL # BLD AUTO: 0.2 10E3/UL (ref 0–0.7)
EOSINOPHIL NFR BLD AUTO: 2 %
ERYTHROCYTE [DISTWIDTH] IN BLOOD BY AUTOMATED COUNT: 15.4 % (ref 10–15)
GFR SERPL CREATININE-BSD FRML MDRD: 62 ML/MIN/1.73M2
GLUCOSE BLD-MCNC: 127 MG/DL (ref 70–99)
GLUCOSE UR STRIP-MCNC: 30 MG/DL
HCT VFR BLD AUTO: 36.8 % (ref 40–53)
HGB BLD-MCNC: 10.4 G/DL (ref 13.3–17.7)
HGB BLD-MCNC: 11.2 G/DL (ref 13.3–17.7)
HGB UR QL STRIP: NEGATIVE
HOLD SPECIMEN: NORMAL
HYALINE CASTS: 12 /LPF
IMM GRANULOCYTES # BLD: 0.1 10E3/UL
IMM GRANULOCYTES NFR BLD: 1 %
INR PPP: 1.99 (ref 0.85–1.15)
KETONES UR STRIP-MCNC: NEGATIVE MG/DL
LEUKOCYTE ESTERASE UR QL STRIP: NEGATIVE
LYMPHOCYTES # BLD AUTO: 0.7 10E3/UL (ref 0.8–5.3)
LYMPHOCYTES NFR BLD AUTO: 7 %
MCH RBC QN AUTO: 26.7 PG (ref 26.5–33)
MCHC RBC AUTO-ENTMCNC: 30.4 G/DL (ref 31.5–36.5)
MCV RBC AUTO: 88 FL (ref 78–100)
MONOCYTES # BLD AUTO: 1.6 10E3/UL (ref 0–1.3)
MONOCYTES NFR BLD AUTO: 15 %
MUCOUS THREADS #/AREA URNS LPF: PRESENT /LPF
NEUTROPHILS # BLD AUTO: 8.1 10E3/UL (ref 1.6–8.3)
NEUTROPHILS NFR BLD AUTO: 75 %
NITRATE UR QL: NEGATIVE
NRBC # BLD AUTO: 0 10E3/UL
NRBC BLD AUTO-RTO: 0 /100
PH UR STRIP: 5.5 [PH] (ref 5–7)
PLATELET # BLD AUTO: 244 10E3/UL (ref 150–450)
POTASSIUM BLD-SCNC: 4.3 MMOL/L (ref 3.4–5.3)
RBC # BLD AUTO: 4.19 10E6/UL (ref 4.4–5.9)
RBC URINE: 10 /HPF
SARS-COV-2 RNA RESP QL NAA+PROBE: NEGATIVE
SODIUM SERPL-SCNC: 137 MMOL/L (ref 133–144)
SP GR UR STRIP: 1.03 (ref 1–1.03)
SQUAMOUS EPITHELIAL: 1 /HPF
UROBILINOGEN UR STRIP-MCNC: NORMAL MG/DL
WBC # BLD AUTO: 10.7 10E3/UL (ref 4–11)
WBC URINE: 4 /HPF

## 2022-05-08 PROCEDURE — 36415 COLL VENOUS BLD VENIPUNCTURE: CPT | Performed by: HOSPITALIST

## 2022-05-08 PROCEDURE — 85025 COMPLETE CBC W/AUTO DIFF WBC: CPT | Performed by: EMERGENCY MEDICINE

## 2022-05-08 PROCEDURE — 96365 THER/PROPH/DIAG IV INF INIT: CPT

## 2022-05-08 PROCEDURE — 120N000001 HC R&B MED SURG/OB

## 2022-05-08 PROCEDURE — 96361 HYDRATE IV INFUSION ADD-ON: CPT

## 2022-05-08 PROCEDURE — 250N000011 HC RX IP 250 OP 636: Performed by: HOSPITALIST

## 2022-05-08 PROCEDURE — 73562 X-RAY EXAM OF KNEE 3: CPT | Mod: RT

## 2022-05-08 PROCEDURE — 82310 ASSAY OF CALCIUM: CPT | Performed by: EMERGENCY MEDICINE

## 2022-05-08 PROCEDURE — 99223 1ST HOSP IP/OBS HIGH 75: CPT | Mod: AI | Performed by: HOSPITALIST

## 2022-05-08 PROCEDURE — 85610 PROTHROMBIN TIME: CPT | Performed by: HOSPITALIST

## 2022-05-08 PROCEDURE — 99285 EMERGENCY DEPT VISIT HI MDM: CPT | Mod: 25

## 2022-05-08 PROCEDURE — 85018 HEMOGLOBIN: CPT | Performed by: HOSPITALIST

## 2022-05-08 PROCEDURE — 36415 COLL VENOUS BLD VENIPUNCTURE: CPT | Performed by: EMERGENCY MEDICINE

## 2022-05-08 PROCEDURE — 250N000013 HC RX MED GY IP 250 OP 250 PS 637: Performed by: EMERGENCY MEDICINE

## 2022-05-08 PROCEDURE — 250N000013 HC RX MED GY IP 250 OP 250 PS 637: Performed by: HOSPITALIST

## 2022-05-08 PROCEDURE — U0005 INFEC AGEN DETEC AMPLI PROBE: HCPCS | Performed by: EMERGENCY MEDICINE

## 2022-05-08 PROCEDURE — C9803 HOPD COVID-19 SPEC COLLECT: HCPCS

## 2022-05-08 PROCEDURE — 70450 CT HEAD/BRAIN W/O DYE: CPT

## 2022-05-08 PROCEDURE — 96366 THER/PROPH/DIAG IV INF ADDON: CPT

## 2022-05-08 PROCEDURE — 81001 URINALYSIS AUTO W/SCOPE: CPT | Performed by: EMERGENCY MEDICINE

## 2022-05-08 PROCEDURE — 258N000003 HC RX IP 258 OP 636: Performed by: HOSPITALIST

## 2022-05-08 RX ORDER — ONDANSETRON 4 MG/1
4 TABLET, ORALLY DISINTEGRATING ORAL EVERY 6 HOURS PRN
Status: DISCONTINUED | OUTPATIENT
Start: 2022-05-08 | End: 2022-05-13 | Stop reason: HOSPADM

## 2022-05-08 RX ORDER — FAMOTIDINE 20 MG/1
20 TABLET, FILM COATED ORAL DAILY
Status: DISCONTINUED | OUTPATIENT
Start: 2022-05-08 | End: 2022-05-13 | Stop reason: HOSPADM

## 2022-05-08 RX ORDER — HYDROCODONE BITARTRATE AND ACETAMINOPHEN 5; 325 MG/1; MG/1
2 TABLET ORAL ONCE
Status: COMPLETED | OUTPATIENT
Start: 2022-05-08 | End: 2022-05-08

## 2022-05-08 RX ORDER — DULOXETIN HYDROCHLORIDE 60 MG/1
60 CAPSULE, DELAYED RELEASE ORAL DAILY
Status: DISCONTINUED | OUTPATIENT
Start: 2022-05-09 | End: 2022-05-13 | Stop reason: HOSPADM

## 2022-05-08 RX ORDER — AMOXICILLIN 250 MG
1 CAPSULE ORAL 2 TIMES DAILY PRN
Status: DISCONTINUED | OUTPATIENT
Start: 2022-05-08 | End: 2022-05-13 | Stop reason: HOSPADM

## 2022-05-08 RX ORDER — ALBUTEROL SULFATE 90 UG/1
1-2 AEROSOL, METERED RESPIRATORY (INHALATION) EVERY 6 HOURS PRN
Status: DISCONTINUED | OUTPATIENT
Start: 2022-05-08 | End: 2022-05-13 | Stop reason: HOSPADM

## 2022-05-08 RX ORDER — LIDOCAINE 40 MG/G
CREAM TOPICAL
Status: DISCONTINUED | OUTPATIENT
Start: 2022-05-08 | End: 2022-05-09

## 2022-05-08 RX ORDER — ACETAMINOPHEN 650 MG/1
650 SUPPOSITORY RECTAL EVERY 6 HOURS PRN
Status: DISCONTINUED | OUTPATIENT
Start: 2022-05-08 | End: 2022-05-13 | Stop reason: HOSPADM

## 2022-05-08 RX ORDER — HYDROXYZINE HYDROCHLORIDE 25 MG/1
50 TABLET, FILM COATED ORAL EVERY 6 HOURS PRN
Status: DISCONTINUED | OUTPATIENT
Start: 2022-05-08 | End: 2022-05-13 | Stop reason: HOSPADM

## 2022-05-08 RX ORDER — CEFTRIAXONE 1 G/1
1 INJECTION, POWDER, FOR SOLUTION INTRAMUSCULAR; INTRAVENOUS EVERY 24 HOURS
Status: DISCONTINUED | OUTPATIENT
Start: 2022-05-08 | End: 2022-05-10

## 2022-05-08 RX ORDER — LEVOFLOXACIN 750 MG/1
750 TABLET, FILM COATED ORAL DAILY
Status: ON HOLD | COMMUNITY
Start: 2022-05-06 | End: 2022-05-13

## 2022-05-08 RX ORDER — LANOLIN ALCOHOL/MO/W.PET/CERES
3 CREAM (GRAM) TOPICAL
Status: DISCONTINUED | OUTPATIENT
Start: 2022-05-08 | End: 2022-05-13 | Stop reason: HOSPADM

## 2022-05-08 RX ORDER — ONDANSETRON 2 MG/ML
4 INJECTION INTRAMUSCULAR; INTRAVENOUS EVERY 6 HOURS PRN
Status: DISCONTINUED | OUTPATIENT
Start: 2022-05-08 | End: 2022-05-13 | Stop reason: HOSPADM

## 2022-05-08 RX ORDER — AMOXICILLIN 250 MG
2 CAPSULE ORAL 2 TIMES DAILY PRN
Status: DISCONTINUED | OUTPATIENT
Start: 2022-05-08 | End: 2022-05-13 | Stop reason: HOSPADM

## 2022-05-08 RX ORDER — GABAPENTIN 300 MG/1
600 CAPSULE ORAL AT BEDTIME
Status: DISCONTINUED | OUTPATIENT
Start: 2022-05-08 | End: 2022-05-13 | Stop reason: HOSPADM

## 2022-05-08 RX ORDER — FAMOTIDINE 40 MG/1
40 TABLET, FILM COATED ORAL 2 TIMES DAILY
Status: ON HOLD | COMMUNITY
End: 2022-08-24

## 2022-05-08 RX ORDER — SODIUM CHLORIDE 9 MG/ML
INJECTION, SOLUTION INTRAVENOUS CONTINUOUS
Status: DISCONTINUED | OUTPATIENT
Start: 2022-05-08 | End: 2022-05-09

## 2022-05-08 RX ORDER — AZITHROMYCIN 250 MG/1
250 TABLET, FILM COATED ORAL DAILY
Status: COMPLETED | OUTPATIENT
Start: 2022-05-08 | End: 2022-05-11

## 2022-05-08 RX ORDER — GUAIFENESIN/DEXTROMETHORPHAN 100-10MG/5
10 SYRUP ORAL EVERY 4 HOURS PRN
Status: DISCONTINUED | OUTPATIENT
Start: 2022-05-08 | End: 2022-05-13 | Stop reason: HOSPADM

## 2022-05-08 RX ORDER — ALBUTEROL SULFATE 0.83 MG/ML
2.5 SOLUTION RESPIRATORY (INHALATION) EVERY 6 HOURS PRN
Status: DISCONTINUED | OUTPATIENT
Start: 2022-05-08 | End: 2022-05-13 | Stop reason: HOSPADM

## 2022-05-08 RX ORDER — HYDROXYZINE PAMOATE 50 MG/1
50 CAPSULE ORAL DAILY
Status: ON HOLD | COMMUNITY
End: 2022-07-27

## 2022-05-08 RX ORDER — ACETAMINOPHEN 325 MG/1
650 TABLET ORAL EVERY 6 HOURS PRN
Status: DISCONTINUED | OUTPATIENT
Start: 2022-05-08 | End: 2022-05-13 | Stop reason: HOSPADM

## 2022-05-08 RX ADMIN — CEFTRIAXONE SODIUM 1 G: 1 INJECTION, POWDER, FOR SOLUTION INTRAMUSCULAR; INTRAVENOUS at 20:22

## 2022-05-08 RX ADMIN — SODIUM CHLORIDE: 9 INJECTION, SOLUTION INTRAVENOUS at 16:38

## 2022-05-08 RX ADMIN — FAMOTIDINE 20 MG: 20 TABLET ORAL at 23:32

## 2022-05-08 RX ADMIN — GABAPENTIN 600 MG: 300 CAPSULE ORAL at 23:32

## 2022-05-08 RX ADMIN — AZITHROMYCIN MONOHYDRATE 250 MG: 250 TABLET ORAL at 20:22

## 2022-05-08 RX ADMIN — SODIUM CHLORIDE 1000 ML: 9 INJECTION, SOLUTION INTRAVENOUS at 17:28

## 2022-05-08 RX ADMIN — ACETAMINOPHEN 650 MG: 325 TABLET ORAL at 23:45

## 2022-05-08 RX ADMIN — HYDROCODONE BITARTRATE AND ACETAMINOPHEN 2 TABLET: 5; 325 TABLET ORAL at 12:44

## 2022-05-08 ASSESSMENT — ACTIVITIES OF DAILY LIVING (ADL)
ADLS_ACUITY_SCORE: 14

## 2022-05-08 NOTE — ED TRIAGE NOTES
Per EMS report: EMS called by family for weakness. Pt had a fall on Friday after a doctors office visit while getting back into the car. Fell backward, family reports did not hit head. Brought into house and onto recliner after incident but hasn't been able to get up since. Reports 5/10 Right Knee Pain, and has been non weight bearing on that leg.      Triage Assessment     Row Name 05/08/22 1144       Triage Assessment (Adult)    Airway WDL WDL       Respiratory WDL    Respiratory WDL WDL       Skin Circulation/Temperature WDL    Skin Circulation/Temperature WDL WDL       Cardiac WDL    Cardiac WDL WDL       Peripheral/Neurovascular WDL    Peripheral Neurovascular WDL WDL       Cognitive/Neuro/Behavioral WDL    Cognitive/Neuro/Behavioral WDL WDL

## 2022-05-08 NOTE — H&P
[facility]    History and Physical - Hospitalist Service       Date of Admission:  5/8/2022    Assessment & Plan      Hermilo Velasquez is a 89 year old male admitted on 5/8/2022 for right knee pain and effusion after a fall 3 days ago.    Note: med rec was not completed and pt became mildly hypotensive and tachycardic in ED. Holding all home meds at this point as I would be holding DOAC and anti hypertensives anyway. Day provider please review med rec.    Right knee pain s/p fall  Right knee effusion  Frequent falls  Fall last Friday. Patient has been unable to ambulate and has had worsening swelling in R knee. Head CT non acute. XR knee shows DJD and moderate effusion. Concern for hemarthrosis in setting of chronic anticoagulation on DOAC. Hgb stable.  - admit to inpatient  - consult to orthopedics  - pain control  - hold anticoagulation  - hold NSAIDs for now  - assist with ambulation  - PT/OT    CAP, partially treated  Chronic COPD  Diagnosed at PCP visit on Friday and pt prescribed levofloxacin. He did have leukocytosis and cough at the time of MD visit. Took 2 doses, with last dose of abx yesterday. Given hospitalization and renal disease, will treat with Ceftriaxone and azithro while here.  - day 3 abx, first 2 days with 750mg po levofloxacin  - Ceftriaxone and azithro in hospital  - [facility] for cough  - continue PTA inhalers and nebs    Chronic anemia: He has a history of iron deficiency anemia and occult GI bleeding. Recent admissions with Hgb as low as 6.8 requiring transfusion. Recent EGD with erosive gastropathy but no stigmata of bleed. Colonoscopy showed diverticulosis and multiple AVM but no source of bleed, they were treated. Plan was for outpatient videocapsule study.  -- apixaban resumed on discharge in mid April. Holding now.    Dementia (?)  - patient doesn't have noted history of this that I can see, but clearly has cognitive deficits.  - OT for  [facility]     CKD3: Creat ~ 1.1 on admission. Chronic, stable.     Aortic stenosis s/p TAVR with bioprosthetic valve replacement for Aortic Stenosis in 2015; coronary angiogram at that time was negative for obstructive CAD. TTE 4/2021 showed preserved LVEF with mild to moderate prosthetic valve stenosis.      PAF, chronic:     -- Telemetry     -- Resume Toprol with resolution of hypotension; resume apixaban     Recurrent PE: Noted. Holding anticoagulation     Dyslipidemia: Resume Lipitor      Stage 4 Non-Hodgkins Lymphoma and MGUS: Followed by Dr. Zurita of [facility]. His lymphoma was diagnosed and treated with systemic chemotherapy in 2012 and reportedly has been in remission since. His MGUS is being monitored as an outpatient.    -- Noted     Chronic peripheral neuropathy:     --  B12 level recently checked; 431 and adequate    -- resume Cymbalta       Bullous pemphigoid and chronic pruritus: By history; noted.     AAA: 3.5 cm, seen on CT; close outpatient follow up      Suspected severe protein calorie malnutrition: Nutrition services consulted.    Covid negative     Diet: Regular Diet Adult    DVT Prophylaxis: Pneumatic Compression Devices  Suazo Catheter: Not present  Central Lines: None  Cardiac Monitoring: None  Code Status:   Full code    Clinically Significant Risk Factors Present on Admission               # Coagulation Defect: home medication list includes an anticoagulant medication        Disposition Plan   Expected Discharge: 2+ midnights  Anticipated discharge location:  Awaiting care coordination huddle  Delays:            The patient's care was discussed with the Patient and ED provider Dr Obando.    Gracie Eller MD  Hospitalist Service  [facility]  Securely message with the Vocera Web Console (learn more here)  Text page via [employer] Paging/Directory         ______________________________________________________________________    Chief Complaint   R knee pain after  fall    History is obtained from the ED staff and patient    History of Present Illness   Hermilo Velasquez is a 89 year old male who presents alone for generalized weakness after a fall last Friday. He was leaving a doctor's appt with his wife and fell getting into the car. Family got him into the home but he reportedly didn't leave the chair they set him in as he was unable to bear weight on affected knee. He had incontinence likely 2/2 lack of mobility. He lives alone with wife. The pain is a 7/10. He is on blood thinners. Denies taking meds at home for pain but unreliable historian.    I did attempt to reach his wife by phone but there was no answer and no voicemail.      Review of Systems    The 10 point Review of Systems is negative other than noted in the HPI or here.     Past Medical History    I have reviewed this patient's medical history and updated it with pertinent information if needed.   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 Vasques      Aortic stenosis     Severe AS, 9/2015 AVR - [facility] TRIFECTA Bovine bioprosthesis 25MM TF-25A     Atrial fibrillation (H)     9/2015 Paroxysmal post op Afib - discharged on Warfarin and a beta blocker     COPD (chronic obstructive pulmonary disease) (H)      Deep vein thrombosis (H)      GERD (gastroesophageal reflux disease)      Heart murmur      Monoclonal gammopathy     plasmacyte prominent causing monoclonal gammopathy     Need for SBE (subacute bacterial endocarditis) prophylaxis      Neuropathy      Other and unspecified hyperlipidemia      Other malignant lymphomas     non hodgkin's lymphoma     RBBB (right bundle branch block)      Severe sepsis with acute organ dysfunction (H) 11/16/2015     Unspecified hereditary and idiopathic peripheral neuropathy        Past Surgical History   I have reviewed this patient's surgical history and updated it with pertinent information if  needed.  Past Surgical History:   Procedure Laterality Date     APPENDECTOMY       AS TOTAL KNEE ARTHROPLASTY       BACK SURGERY       ESOPHAGOSCOPY, GASTROSCOPY, DUODENOSCOPY (EGD), COMBINED N/A 11/28/2015    Procedure: COMBINED ESOPHAGOSCOPY, GASTROSCOPY, DUODENOSCOPY (EGD);  Surgeon: Danis Castillo MD;  Location:  GI     ESOPHAGOSCOPY, GASTROSCOPY, DUODENOSCOPY (EGD), COMBINED N/A 7/26/2018    Procedure: COMBINED ESOPHAGOSCOPY, GASTROSCOPY, DUODENOSCOPY (EGD);;  Surgeon: Toby Dong DO;  Location:  GI     ESOPHAGOSCOPY, GASTROSCOPY, DUODENOSCOPY (EGD), COMBINED N/A 8/17/2020    Procedure: ESOPHAGOGASTRODUODENOSCOPY (EGD);  Surgeon: Herrera Henry MD;  Location:  GI     ESOPHAGOSCOPY, GASTROSCOPY, DUODENOSCOPY (EGD), COMBINED N/A 10/24/2020    Procedure: ESOPHAGOGASTRODUODENOSCOPY (EGD);  Surgeon: Vick Florentino MD;  Location:  GI     ESOPHAGOSCOPY, GASTROSCOPY, DUODENOSCOPY (EGD), COMBINED N/A 4/11/2022    Procedure: ESOPHAGOGASTRODUODENOSCOPY (EGD);  Surgeon: Vick Florentino MD;  Location:  GI     HERNIA REPAIR  2006     HERNIORRHAPHY VENTRAL  4/17/2013    Procedure: HERNIORRHAPHY VENTRAL;  VENTRAL HERNIA REPAIR WITH MESH;  Surgeon: Patel Guzman MD;  Location:  OR     KNEE SURGERY      arthroscopic right knee surgery      LOBECTOMY LUNG Right 3/26/2019    POSSIBLE LOBECTOMY LUNG per Dr. Vasques at [facility]     REPAIR LIGAMENT ANKLE  2/23/2012    Procedure:REPAIR LIGAMENT ANKLE; LEFT TARSAL TUNNEL RELEASE OF KNOT OF ARIEL RELEASE; Surgeon:SAUL PUENTE; Location: OR     REPLACE VALVE AORTIC N/A 9/3/2015    Procedure: REPLACE VALVE AORTIC;  Surgeon: Antonino Mitchell MD;  Location:  OR     ROTATOR CUFF REPAIR RT/LT      bilateral     SPINE SURGERY      3 spine surgeries     THORACOTOMY, WEDGE RESECTION LUNG, COMBINED Right 3/26/2019    RIGHT THORACOTOMY, WEDGE RESECTION, RIGHT LOWER LOBE LUNG NODULE,;  Surgeon: Jose A Vasques MD;   Location: [facility] OR     TONSILLECTOMY       TURP         Social History   Patient lives with wife. Ambulates with walker at baseline.  Social History     Tobacco Use     Smoking status: Former Smoker     Packs/day: 2.00     Years: 55.00     Pack years: 110.00     Quit date: 1998     Years since quittin.3     Smokeless tobacco: Never Used   Substance Use Topics     Alcohol use: Yes     Alcohol/week: 0.0 standard drinks     Comment: 1 drink per day     Drug use: No       Family History   I have reviewed this patient's family history and updated it with pertinent information if needed.  Family History   Problem Relation Age of Onset     C.A.D. Father      Emphysema Father      Melanoma No family hx of      Skin Cancer No family hx of        Prior to Admission Medications   Prior to Admission Medications   Prescriptions Last Dose Informant Patient Reported? Taking?   ACE/ARB/ARNI NOT PRESCRIBED (INTENTIONAL)  at -  No No   Sig: Please choose reason not prescribed from choices below.   DULoxetine (CYMBALTA) 60 MG capsule   Yes No   Sig: Take 60 mg by mouth daily   albuterol (PROAIR HFA/PROVENTIL HFA/VENTOLIN HFA) 108 (90 Base) MCG/ACT inhaler  at PRN  No Yes   Sig: Inhale 1-2 puffs into the lungs every 6 hours as needed for shortness of breath / dyspnea or wheezing   albuterol (PROVENTIL) (2.5 MG/3ML) 0.083% neb solution  at PRN  No Yes   Sig: Take 1 vial (2.5 mg) by nebulization every 6 hours as needed for shortness of breath / dyspnea or wheezing   apixaban ANTICOAGULANT (ELIQUIS) 5 MG tablet   Yes No   Sig: Take 5 mg by mouth in the morning and 5 mg in the evening.   atorvastatin (LIPITOR) 10 MG tablet   No No   Sig: Take 1 tablet (10 mg) by mouth daily   famotidine (PEPCID) 20 MG tablet   No No   Sig: Take 1 tablet (20 mg) by mouth 2 times daily   ferrous sulfate (FE TABS) 325 (65 Fe) MG EC tablet  Self No No   Sig: Take 1 tablet (325 mg) by mouth daily   fluticasone-vilanterol (BREO ELLIPTA) 200-25 MCG/INH  "inhaler   Yes No   Sig: Inhale 1 puff into the lungs daily   gabapentin (NEURONTIN) 300 MG capsule   Yes No   Sig: Take 600 mg by mouth At Bedtime   metoprolol succinate ER (TOPROL-XL) 25 MG 24 hr tablet  Self No No   Sig: Take 0.5 tablets (12.5 mg) by mouth daily   triamcinolone (KENALOG) 0.1 % external cream  Self No No   Sig: Apply topically 2 times daily as needed for irritation      Facility-Administered Medications: None     Allergies   Allergies   Allergen Reactions     Lidocaine Blisters     Allergy to lidocaine ointment     Omeprazole Itching     Pantoprazole Itching     Prevacid [Lansoprazole] Itching     Lasix [Furosemide] Rash     Penicillins Rash     \"broke out from injection\" 60 yrs ago  Tolerates cephalosporins       Physical Exam   Vital Signs: Temp: 98.5  F (36.9  C) Temp src: Oral BP: 104/67     Resp: 18        Weight: 0 lbs 0 oz    Constitutional: Awake, alert, cooperative, pleasantly confused.  HEENT: neck supple, no LAD noted, moist mucous membranes  Respiratory: Clear to auscultation bilaterally, no crackles or wheezing  Cardiovascular: Tachycardic.  GI: Normal bowel sounds, soft, non-distended, non-tender  Skin/Integumen: No rashes, no cyanosis, no edema. Wearing diaper.  Ext: R knee with effusion. Leg in ace bandage.  Neuro: Unable due to confusion. Non focal.      Data   Data reviewed today: I reviewed all medications, new labs and imaging results over the last 24 hours.     Recent Labs   Lab 05/08/22  1223   WBC 10.7   HGB 11.2*   MCV 88         POTASSIUM 4.3   CHLORIDE 105   CO2 26   BUN 24   CR 1.13   ANIONGAP 6   AMRITA 9.4   *     No images are attached to the encounter.  " spouse

## 2022-05-08 NOTE — ED PROVIDER NOTES
History   Chief Complaint:  Knee pain    The history is provided by the patient.      Hermilo Velasquez is an 89 year old male with atrial fibrillation on Xarelto who presents with knee pain after a fall. Two days ago Hermilo fell while trying to get into his car after a doctor's appointment. He was able to get home with his wife but since then has been unable to get out of his chair or weight bear due to right knee pain and swelling. Hermilo does not believe her hit his head with the fall and did not lose consciousness. He has had no analgesics prior to arrival and rates his pain as a 7/10 currently. He denies any other injuries or complaints.    Hermilo's wife, Roberta, remarks she is concerned he hit his head with the fall although she witnessed it and did not see any head injury. She is concerned he has been unable to get up and around, including not being able to get to the bathroom to urinate.    Review of Systems   Constitutional: Positive for activity change (due to pain).   Musculoskeletal: Positive for arthralgias (right knee) and joint swelling (right knee).   All other systems reviewed and are negative.    Allergies:  Lidocaine  Omeprazole  Pantoprazole  Prevacid [Lansoprazole]  Lasix [Furosemide]  Penicillins    Medications:  apixaban  atorvastatin   DULoxetine  famotidine   ferrous sulfate  gabapentin   metoprolol succinate     Past Medical History:     Adenocarcinoma, lung, right    Aortic stenosis   Atrial fibrillation   COPD  Deep vein thrombosis    GERD   Heart murmur   Monoclonal gammopathy   Neuropathy   Other and unspecified hyperlipidemia   Other malignant lymphomas   RBBB   Severe sepsis with acute organ dysfunction   Unspecified hereditary and idiopathic peripheral neuropathy   Nonrheumatic aortic valve stenosis  Non Hodgkin's lymphoma   Anemia  Hyperlipidemia  Spongiotic dermatitis  Nodule of lower lobe of right lung  Malignant neoplasm of lung  Bullous pemphigoid  Centrilobular emphysema  "  Heart murmur, systolic  FRED   CKD   Gastrointestinal hemorrhage  Renal insufficiency  Acute on chronic diastolic congestive heart failure  History of pulmonary embolism      Past Surgical History:    Appendectomy  As total knee arthroplasty  Back surgery  EGD, combined (x5)  Hernia repair  Herniorrhaphy ventral  Knee surgery  Lobectomy lung  Repair ligament ankle  Spinal surgery (x3)  Replace valve aortic  Turp  Tonsillectomy  Thoracotomy, wedge resection lung, combined    Family History:    CAD - Father  Emphysema - Father    Social History:  Hermilo is .  He lives with his wife, Roberta.    Physical Exam     Patient Vitals for the past 24 hrs:   BP Temp Temp src Resp Height   05/08/22 1417 104/67 -- -- -- 1.778 m (5' 10\")   05/08/22 1146 129/75 98.5  F (36.9  C) Oral 18 --     Physical Exam  General: Well-developed and well-nourished. Well appearing elderly man. Cooperative.  Head:  Atraumatic.  Eyes:  Conjunctivae, lids, and sclerae are normal.  Neck:  Supple. Normal range of motion.  CV:  2+ right DP  Resp:  No respiratory distress.   GI:  Non-distended.    MS:  Decreased ROM of the right knee due to pain. Moderate-large right knee effusion. Tenderness to palpation of the right knee without focal bony tenderness to the right hip, thigh, lower leg, or ankle. No tenderness reproduced in the left lower extremity. No bilateral lower extremity edema.  Skin:  Warm. Non-diaphoretic. No pallor. No skin changes to the right knee.  Neuro: Awake. A&Ox3. Normal strength.  Psych:  Normal mood and affect. Normal speech.  Vitals reviewed.    Emergency Department Course     Imaging:  CT Head w/o Contrast   Final Result   IMPRESSION:    1.  No CT evidence for acute intracranial process.   2.  Brain atrophy and presumed chronic microvascular ischemic changes as above.      XR Knee Right 3 Views   Final Result   IMPRESSION: No fracture. Advanced degenerative changes in the medial compartment. Moderate-sized knee joint " effusion. Slight flexion deformity of the knee. Moderate degenerative changes in the patellofemoral compartment.        Report per radiology    Laboratory:  Labs Ordered and Resulted from Time of ED Arrival to Time of ED Departure   BASIC METABOLIC PANEL - Abnormal       Result Value    Sodium 137      Potassium 4.3      Chloride 105      Carbon Dioxide (CO2) 26      Anion Gap 6      Urea Nitrogen 24      Creatinine 1.13      Calcium 9.4      Glucose 127 (*)     GFR Estimate 62     ROUTINE UA WITH MICROSCOPIC REFLEX TO CULTURE - Abnormal    Color Urine Yellow      Appearance Urine Cloudy (*)     Glucose Urine 30  (*)     Bilirubin Urine Negative      Ketones Urine Negative      Specific Gravity Urine 1.027      Blood Urine Negative      pH Urine 5.5      Protein Albumin Urine 50  (*)     Urobilinogen Urine Normal      Nitrite Urine Negative      Leukocyte Esterase Urine Negative      Bacteria Urine Few (*)     Mucus Urine Present (*)     Calcium Oxalate Crystals Urine Few (*)     RBC Urine 10 (*)     WBC Urine 4      Squamous Epithelials Urine 1      Hyaline Casts Urine 12 (*)    CBC WITH PLATELETS AND DIFFERENTIAL - Abnormal    WBC Count 10.7      RBC Count 4.19 (*)     Hemoglobin 11.2 (*)     Hematocrit 36.8 (*)     MCV 88      MCH 26.7      MCHC 30.4 (*)     RDW 15.4 (*)     Platelet Count 244      % Neutrophils 75      % Lymphocytes 7      % Monocytes 15      % Eosinophils 2      % Basophils 0      % Immature Granulocytes 1      NRBCs per 100 WBC 0      Absolute Neutrophils 8.1      Absolute Lymphocytes 0.7 (*)     Absolute Monocytes 1.6 (*)     Absolute Eosinophils 0.2      Absolute Basophils 0.0      Absolute Immature Granulocytes 0.1      Absolute NRBCs 0.0     COVID-19 VIRUS (CORONAVIRUS) BY PCR - Normal    SARS CoV2 PCR Negative       Emergency Department Course:     Reviewed:  I reviewed nursing notes, vitals, past medical history, and Care Everywhere.     Assessments:  1200 I obtained history and examined  the patient as noted above.   1332 I rechecked the patient and explained findings. He agrees with the plan for admission.  1423 I rechecked Hermilo and he feels better. He will trial ambulation.  1511 I rechecked the patient who agrees with admission as trial of ambulation went poorly.     Consults:  1328 I spoke with his wife, Roberta, as per HPI.   1528 I consulted with Dr. Eller, hospitalist.    Interventions:  1244 HYDROcodone-acetaminophen (NORCO) 5-325 MG per tablet 2 tablet, PO    Disposition:  The patient was admitted to the hospital under the care of Dr. Eller.     Impression & Plan   Medical Decision Making:  Hermilo is an 89-year-old man on Xarelto for atrial fibrillation who had a mechanical fall 2 days ago.  Since then he has been unable to get out of his chair and around his home due to right knee pain and swelling.  He denies any other injuries or complaints.  He appears well on exam although the right knee has a moderate to large effusion and is tender to palpation.  This limits his range of motion.  There is no focal bony tenderness elsewhere in the lower extremities nor any other external evidence of trauma.  There is no indication this is a septic joint including no erythema or warmth to the touch as well as clearly preceding trauma.  There is question whether or not he hit his head and he was sent for CT scan given his anticoagulated status and age.  Fortunately, this does not reveal intracranial hemorrhage or skull fracture.  X-ray of the knee reveals the effusion without fracture. This is presumed to be hemarthrosis given his use of Xarelto.  Basic laboratory studies are noncontributory.  There is no UTI.    Despite his overall reassuring work-up, Hermilo is in too much pain to safely ambulate even when we trialed both a knee immobilizer or an Ace bandage.  Particularly given his age, he requires hospitalization for pain control and PT/OT and he is amenable to this.  He was given Norco in the  emergency department and felt this and the Ace bandage were helpful.  His wife was contacted and understands admission plan as well.  I discussed the patient's case with Dr. Eller who accepts admission and has no further orders.    Diagnosis:    ICD-10-CM    1. Hemarthrosis  M25.00    2. Anticoagulated  Z79.01    3. Fall, initial encounter  W19.XXXA        Scribe Disclosure:  I, Jaswant Paiz, am serving as a scribe at 12:06 PM on 5/8/2022 to document services personally performed by Gail Obando MD based on my observations and the provider's statements to me.     Gail Obando MD  05/09/22 [id number]

## 2022-05-08 NOTE — ED NOTES
Bed: ED11  Expected date:   Expected time:   Means of arrival:   Comments:  Hems [address] 89 F knee pain after fall eta 1130

## 2022-05-08 NOTE — ED NOTES
"[facility]  ED Nurse Handoff Report    ED Chief complaint: Generalized Weakness      ED Diagnosis:   Final diagnoses:   None       Code Status: Full Code-prior full    Allergies:   Allergies   Allergen Reactions     Lidocaine Blisters     Allergy to lidocaine ointment     Omeprazole Itching     Pantoprazole Itching     Prevacid [Lansoprazole] Itching     Lasix [Furosemide] Rash     Penicillins Rash     \"broke out from injection\" 60 yrs ago  Tolerates cephalosporins       Patient Story: in via ems. Family called in, pt fell last Friday, reports did not hit head, placed in chair after and hasn't gotten up from chair since, in with concern for weakness.   Focused Assessment:  Pt reports 5/10 knee pain, states has been struggling with this before fall. No apparent evidence of head trauma.    Treatments and/or interventions provided: Xray, CT head, labs,   Patient's response to treatments and/or interventions: VSS. Pain improved with ordered oxycodone    To be done/followed up on inpatient unit:  See attending orders    Does this patient have any cognitive concerns?: na    Activity level - Baseline/Home:  Stand with Assist  Activity Level - Current:   Stand with Assist    Patient's Preferred language: English   Needed?: No    Isolation: None  Infection: Not Applicable  Patient tested for COVID 19 prior to admission: YES  Bariatric?: No    Vital Signs:   Vitals:    05/08/22 1146 05/08/22 1417   BP: 129/75 104/67   Resp: 18    Temp: 98.5  F (36.9  C)    TempSrc: Oral    Height:  1.778 m (5' 10\")       Cardiac Rhythm:     Was the PSS-3 completed:   Yes  What interventions are required if any?               Family Comments: none at bedside. Wife, Roberta, wants updates  OBS brochure/video discussed/provided to patient/family: No              Name of person given brochure if not patient: na                Relationship to patient: na    For the majority of the shift this patient's behavior was Green. " **reorientation needed to place and situation later in shift  Behavioral interventions performed were care explained, purposeful rounding.    ED NURSE PHONE NUMBER: [id number]

## 2022-05-09 ENCOUNTER — APPOINTMENT (OUTPATIENT)
Dept: GENERAL RADIOLOGY | Facility: CLINIC | Age: 87
DRG: 553 | End: 2022-05-09
Attending: PHYSICIAN ASSISTANT
Payer: COMMERCIAL

## 2022-05-09 ENCOUNTER — MEDICAL CORRESPONDENCE (OUTPATIENT)
Dept: FAMILY MEDICINE | Facility: CLINIC | Age: 87
End: 2022-05-09

## 2022-05-09 ENCOUNTER — TELEPHONE (OUTPATIENT)
Dept: FAMILY MEDICINE | Facility: CLINIC | Age: 87
End: 2022-05-09
Payer: COMMERCIAL

## 2022-05-09 LAB
ANION GAP SERPL CALCULATED.3IONS-SCNC: 7 MMOL/L (ref 3–14)
APPEARANCE FLD: ABNORMAL
BUN SERPL-MCNC: 23 MG/DL (ref 7–30)
CALCIUM SERPL-MCNC: 8.6 MG/DL (ref 8.5–10.1)
CELL COUNT BODY FLUID SOURCE: ABNORMAL
CHLORIDE BLD-SCNC: 110 MMOL/L (ref 94–109)
CO2 SERPL-SCNC: 18 MMOL/L (ref 20–32)
COLOR FLD: ABNORMAL
CREAT SERPL-MCNC: 0.92 MG/DL (ref 0.66–1.25)
CRYSTALS SNV MICRO: NORMAL
ERYTHROCYTE [DISTWIDTH] IN BLOOD BY AUTOMATED COUNT: 15.4 % (ref 10–15)
GFR SERPL CREATININE-BSD FRML MDRD: 80 ML/MIN/1.73M2
GLUCOSE BLD-MCNC: 92 MG/DL (ref 70–99)
HCT VFR BLD AUTO: 33.2 % (ref 40–53)
HGB BLD-MCNC: 9.6 G/DL (ref 13.3–17.7)
LYMPHOCYTES NFR FLD MANUAL: 2 %
MCH RBC QN AUTO: 25.9 PG (ref 26.5–33)
MCHC RBC AUTO-ENTMCNC: 28.9 G/DL (ref 31.5–36.5)
MCV RBC AUTO: 90 FL (ref 78–100)
MONOS+MACROS NFR FLD MANUAL: 3 %
NEUTS BAND NFR FLD MANUAL: 95 %
PLATELET # BLD AUTO: 179 10E3/UL (ref 150–450)
POTASSIUM BLD-SCNC: 3.6 MMOL/L (ref 3.4–5.3)
RBC # BLD AUTO: 3.71 10E6/UL (ref 4.4–5.9)
SODIUM SERPL-SCNC: 135 MMOL/L (ref 133–144)
WBC # BLD AUTO: 8.1 10E3/UL (ref 4–11)
WBC # FLD AUTO: ABNORMAL /UL

## 2022-05-09 PROCEDURE — 82310 ASSAY OF CALCIUM: CPT | Performed by: HOSPITALIST

## 2022-05-09 PROCEDURE — 99233 SBSQ HOSP IP/OBS HIGH 50: CPT | Performed by: NURSE PRACTITIONER

## 2022-05-09 PROCEDURE — 258N000003 HC RX IP 258 OP 636: Performed by: HOSPITALIST

## 2022-05-09 PROCEDURE — 85018 HEMOGLOBIN: CPT | Performed by: HOSPITALIST

## 2022-05-09 PROCEDURE — 255N000002 HC RX 255 OP 636: Performed by: INTERNAL MEDICINE

## 2022-05-09 PROCEDURE — 250N000013 HC RX MED GY IP 250 OP 250 PS 637: Performed by: NURSE PRACTITIONER

## 2022-05-09 PROCEDURE — 250N000013 HC RX MED GY IP 250 OP 250 PS 637: Performed by: HOSPITALIST

## 2022-05-09 PROCEDURE — 120N000001 HC R&B MED SURG/OB

## 2022-05-09 PROCEDURE — 0S9C3ZZ DRAINAGE OF RIGHT KNEE JOINT, PERCUTANEOUS APPROACH: ICD-10-PCS | Performed by: PHYSICIAN ASSISTANT

## 2022-05-09 PROCEDURE — 36415 COLL VENOUS BLD VENIPUNCTURE: CPT | Performed by: HOSPITALIST

## 2022-05-09 PROCEDURE — 87075 CULTR BACTERIA EXCEPT BLOOD: CPT | Performed by: PHYSICIAN ASSISTANT

## 2022-05-09 PROCEDURE — 87205 SMEAR GRAM STAIN: CPT | Performed by: PHYSICIAN ASSISTANT

## 2022-05-09 PROCEDURE — 250N000011 HC RX IP 250 OP 636: Performed by: HOSPITALIST

## 2022-05-09 PROCEDURE — 89051 BODY FLUID CELL COUNT: CPT | Performed by: PHYSICIAN ASSISTANT

## 2022-05-09 PROCEDURE — 89060 EXAM SYNOVIAL FLUID CRYSTALS: CPT | Mod: TC | Performed by: PHYSICIAN ASSISTANT

## 2022-05-09 PROCEDURE — 250N000009 HC RX 250: Performed by: INTERNAL MEDICINE

## 2022-05-09 PROCEDURE — 77002 NEEDLE LOCALIZATION BY XRAY: CPT

## 2022-05-09 RX ORDER — NALOXONE HYDROCHLORIDE 0.4 MG/ML
0.2 INJECTION, SOLUTION INTRAMUSCULAR; INTRAVENOUS; SUBCUTANEOUS
Status: DISCONTINUED | OUTPATIENT
Start: 2022-05-09 | End: 2022-05-13 | Stop reason: HOSPADM

## 2022-05-09 RX ORDER — BUPIVACAINE HYDROCHLORIDE 5 MG/ML
30 INJECTION, SOLUTION EPIDURAL; INTRACAUDAL ONCE
Status: COMPLETED | OUTPATIENT
Start: 2022-05-09 | End: 2022-05-09

## 2022-05-09 RX ORDER — IOPAMIDOL 408 MG/ML
10 INJECTION, SOLUTION INTRATHECAL ONCE
Status: COMPLETED | OUTPATIENT
Start: 2022-05-09 | End: 2022-05-09

## 2022-05-09 RX ORDER — ATORVASTATIN CALCIUM 10 MG/1
10 TABLET, FILM COATED ORAL EVERY EVENING
Status: DISCONTINUED | OUTPATIENT
Start: 2022-05-09 | End: 2022-05-13 | Stop reason: HOSPADM

## 2022-05-09 RX ORDER — NALOXONE HYDROCHLORIDE 0.4 MG/ML
0.4 INJECTION, SOLUTION INTRAMUSCULAR; INTRAVENOUS; SUBCUTANEOUS
Status: DISCONTINUED | OUTPATIENT
Start: 2022-05-09 | End: 2022-05-13 | Stop reason: HOSPADM

## 2022-05-09 RX ORDER — METOPROLOL TARTRATE 25 MG/1
25 TABLET, FILM COATED ORAL 2 TIMES DAILY
Status: DISCONTINUED | OUTPATIENT
Start: 2022-05-09 | End: 2022-05-09

## 2022-05-09 RX ADMIN — GABAPENTIN 600 MG: 300 CAPSULE ORAL at 22:29

## 2022-05-09 RX ADMIN — CEFTRIAXONE SODIUM 1 G: 1 INJECTION, POWDER, FOR SOLUTION INTRAMUSCULAR; INTRAVENOUS at 20:13

## 2022-05-09 RX ADMIN — AZITHROMYCIN MONOHYDRATE 250 MG: 250 TABLET ORAL at 08:26

## 2022-05-09 RX ADMIN — BUPIVACAINE HYDROCHLORIDE 3 ML: 5 INJECTION, SOLUTION EPIDURAL; INTRACAUDAL; PERINEURAL at 14:37

## 2022-05-09 RX ADMIN — METOPROLOL TARTRATE 6.25 MG: 25 TABLET, FILM COATED ORAL at 20:13

## 2022-05-09 RX ADMIN — SODIUM CHLORIDE: 9 INJECTION, SOLUTION INTRAVENOUS at 08:25

## 2022-05-09 RX ADMIN — IOPAMIDOL 2 ML: 408 INJECTION, SOLUTION INTRATHECAL at 14:47

## 2022-05-09 RX ADMIN — ACETAMINOPHEN 650 MG: 325 TABLET ORAL at 08:36

## 2022-05-09 RX ADMIN — DULOXETINE 60 MG: 60 CAPSULE, DELAYED RELEASE ORAL at 08:26

## 2022-05-09 RX ADMIN — ATORVASTATIN CALCIUM 10 MG: 10 TABLET, FILM COATED ORAL at 20:13

## 2022-05-09 RX ADMIN — FLUTICASONE FUROATE AND VILANTEROL TRIFENATATE 1 PUFF: 200; 25 POWDER RESPIRATORY (INHALATION) at 08:36

## 2022-05-09 RX ADMIN — FAMOTIDINE 20 MG: 20 TABLET ORAL at 08:26

## 2022-05-09 ASSESSMENT — ACTIVITIES OF DAILY LIVING (ADL)
ADLS_ACUITY_SCORE: 36
ADLS_ACUITY_SCORE: 38
ADLS_ACUITY_SCORE: 38
ADLS_ACUITY_SCORE: 12
ADLS_ACUITY_SCORE: 8
ADLS_ACUITY_SCORE: 12
ADLS_ACUITY_SCORE: 8
ADLS_ACUITY_SCORE: 38
ADLS_ACUITY_SCORE: 38
ADLS_ACUITY_SCORE: 36
ADLS_ACUITY_SCORE: 12
ADLS_ACUITY_SCORE: 8
ADLS_ACUITY_SCORE: 12
ADLS_ACUITY_SCORE: 32
ADLS_ACUITY_SCORE: 12
ADLS_ACUITY_SCORE: 12
ADLS_ACUITY_SCORE: 38
ADLS_ACUITY_SCORE: 38
ADLS_ACUITY_SCORE: 8
ADLS_ACUITY_SCORE: 36
ADLS_ACUITY_SCORE: 38
ADLS_ACUITY_SCORE: 38

## 2022-05-09 ASSESSMENT — ENCOUNTER SYMPTOMS
ACTIVITY CHANGE: 1
ARTHRALGIAS: 1
JOINT SWELLING: 1

## 2022-05-09 NOTE — PLAN OF CARE
Problem: Plan of Care - These are the overarching goals to be used throughout the patient stay.    Goal: Plan of Care Review/Shift Note  Description: The Plan of Care Review/Shift note should be completed every shift.  The Outcome Evaluation is a brief statement about your assessment that the patient is improving, declining, or no change.  This information will be displayed automatically on your shift note.  Outcome: Ongoing, Progressing     Problem: Risk for Delirium  Goal: Optimal Coping  Outcome: Ongoing, Progressing   Goal Outcome Evaluation:      Seems to be confused at times and has a harder time following direction.  Bedrest with BSC privileges.  Plan for joint aspiration on right knee, immobilizer in place at this time.  Pt did remove the immobilizer once this shift on his own. Patient was redirected to keep on.

## 2022-05-09 NOTE — PROGRESS NOTES
Ortho treatment plan    Discussed with Dr Deshpande    Patient is on schedule for future TKA due to severe DJD  Asp of right knee for therapeutic purposes ordered, labs added - it will likely be hemarthrosis    Once patient's pain improved may discharge and follow-up with Dr. Deshpande as planned. He is unable to accommodate moving up TKA surgery    Bina Raphael PAC

## 2022-05-09 NOTE — PLAN OF CARE
Goal Outcome Evaluation:        Alert, pleasantly confused - disoriented to situation. VSS on 2L O2 via NC. Tele - NSR. C/o 5/10 R knee pain managed with tylenol. Tolerating regular diet. A2 with T/R as tolerated. R knee immobilizer in place. Moderate swelling to R knee. PIV infusing NS @ 100ml/hr. Continent of B&B - uses bedpan and urinal. Ortho consult in place to see pt today. Discharge pending.

## 2022-05-09 NOTE — PROGRESS NOTES
"Pt quite confused at this time, saying \"throw it in the backyard\" about his urinal. Says he's \"not in the hospital\". Pt also mumbling, can not make out what he's saying. Continue to monitor closely, high falls risk.   "

## 2022-05-09 NOTE — TELEPHONE ENCOUNTER
Hospital physicians and therapists and  usually work together to come up with best discharge plan  Unfortunately, it seems like a TCU would be optimal and will likely be recommended

## 2022-05-09 NOTE — CONSULTS
Consult Date: 05/09/2022    CHIEF COMPLAINT:  Swollen painful right knee.    HISTORY OF PRESENT ILLNESS:  Hermilo is a very delightful gentleman.  He is 89 years of age.  Alert and oriented, although he is very hard of hearing, so masks some of the communication problems.  He is on Eliquis.  He has had this problem before.  He had severe pain at home.  His wife was going to bring him into the hospital, fell getting in or out of the car, sustained severe pain.  He could get up the stairs so they called an ambulance.  He was brought to [facility] yesterday.  Denies hitting his head, visual field changes, seizures, stroke, TIAs, etc.    Again, he is well known to me in the office.  His history is well established.     PHYSICAL EXAMINATION:  There was no bruising or open wounds in the right knee, very tense hemarthrosis.  Hip moves well without referred pain.  Difficult to fully assess quads on the right knee, but these seem to be intact.  Generally if there is a ruptured quad you would not see this type of hemarthrosis so tense.    IMAGING:  X-rays were reviewed showing degenerative changes, severe.  I reviewed the office films.  Patella alignment looks the same.    IMPRESSION:  Severe pain secondary to hemarthrosis because he is on Eliquis.  He had the problem.  Fell trying to get to the hospital so I do not think the fall created all this, but he did fall getting out of the car.    PLAN:  Referred on to x-ray department for interventional aspiration.  It will take a large needle and they probably will get out 125-150 mL of blood.  Discharge him home.  Probably should stay off his Eliquis for a few days.  He was scheduled for a total knee replacement.  I think we just need to reassess this carefully.  If there is any doubt, we will get an MRI to rule out quad tear or partial.    Time spent 20 minutes, over 50% of time with the patient.    Giuliano Deshpande MD        D: 05/09/2022   T: 05/09/2022   MT:  [facility]    Name:     CLYDE RODRIGUEZ  MRN:      [id number]        Account:      [id number]   :      1932           Consult Date: 2022     Document: [id number]

## 2022-05-09 NOTE — PHARMACY-ADMISSION MEDICATION HISTORY
Pharmacy Medication History  Admission medication history interview status for the 5/8/2022  admission is complete. See EPIC admission navigator for prior to admission medications     Location of Interview: Phone  Medication history sources: Patient's family/friend (Wife Roberta), Surescripts and Care Everywhere    Significant changes made to the medication list:  Added levofloxacin  Changed Famotidine to 40mg BID (from 20mg BID)  Added PRN hydroxyzine       In the past week, patient estimated taking medication this percent of the time: greater than 90%    Additional medication history information:   Wife initially unsure that she would be able to answer questions about his medications but was able to confirm all medications and the dosages were confirmed with [facility] & [facility].     He began his antibiotic on Friday and had a dose on Friday and Saturday around 6pm.     Medication reconciliation completed by provider prior to medication history? No    Time spent in this activity: 20 minutes    Prior to Admission medications    Medication Sig Last Dose Taking? Auth Provider   albuterol (PROAIR HFA/PROVENTIL HFA/VENTOLIN HFA) 108 (90 Base) MCG/ACT inhaler Inhale 1-2 puffs into the lungs every 6 hours as needed for shortness of breath / dyspnea or wheezing  at PRN Yes Karson Bishop MD   albuterol (PROVENTIL) (2.5 MG/3ML) 0.083% neb solution Take 1 vial (2.5 mg) by nebulization every 6 hours as needed for shortness of breath / dyspnea or wheezing  at PRN Yes Karson Bishop MD   apixaban ANTICOAGULANT (ELIQUIS) 5 MG tablet Take 5 mg by mouth in the morning and 5 mg in the evening. 5/7/2022 at Unknown time Yes Unknown, Entered By History   atorvastatin (LIPITOR) 10 MG tablet Take 1 tablet (10 mg) by mouth daily 5/7/2022 at Unknown time Yes Karson Bishop MD   DULoxetine (CYMBALTA) 60 MG capsule Take 60 mg by mouth daily 5/7/2022 at Unknown time Yes Unknown, Entered By History   famotidine (PEPCID) 40 MG tablet  Take 40 mg by mouth 2 times daily 5/7/2022 at Unknown time Yes Unknown, Entered By History   ferrous sulfate (FE TABS) 325 (65 Fe) MG EC tablet Take 1 tablet (325 mg) by mouth daily 5/7/2022 at Unknown time Yes Karson Bishop MD   fluticasone-vilanterol (BREO ELLIPTA) 200-25 MCG/INH inhaler Inhale 1 puff into the lungs daily 5/7/2022 at Unknown time Yes Unknown, Entered By History   gabapentin (NEURONTIN) 300 MG capsule Take 600 mg by mouth At Bedtime 5/7/2022 at HS Yes Unknown, Entered By History   hydrOXYzine (VISTARIL) 50 MG capsule Take 50 mg by mouth every 6 hours as needed for itching  at PRN Yes Unknown, Entered By History   levofloxacin (LEVAQUIN) 750 MG tablet Take 750 mg by mouth daily 5/7/2022 at 1800 Yes Unknown, Entered By History   metoprolol succinate ER (TOPROL-XL) 25 MG 24 hr tablet Take 0.5 tablets (12.5 mg) by mouth daily 5/7/2022 at Unknown time Yes Karson Bishop MD   triamcinolone (KENALOG) 0.1 % external cream Apply topically 2 times daily as needed for irritation  at PRN Yes Karson Bishop MD   ACE/ARB/ARNI NOT PRESCRIBED (INTENTIONAL) Please choose reason not prescribed from choices below.  Liv Hernandez MD       The information provided in this note is only as accurate as the sources available at the time of update(s)

## 2022-05-09 NOTE — PROGRESS NOTES
[facility]    Medicine Progress Note - Hospitalist Service    Date of Admission:  5/8/2022    Assessment & Plan           Hermilo Velasquez is a 89 year old male with past medical history of lung cancer status post right lower lobe wedge resection, COPD, who deficient anemia, CKD 3, TAVR, PAF, recurrent PEs on chronic anticoagulation, hyperlipidemia, stage IV non-Hodgkin's lymphoma/MGUS in remission, bullous pemphigoid, AAA admitted on 5/8/2022 for right knee pain and effusion after a fall 3 days ago.    Right knee pain s/p fall  Right knee effusion  Frequent falls  Fall on 5/6/2022 patient has been unable to ambulate and has had worsening swelling in R knee. Head CT non acute. XR knee shows DJD and moderate effusion. Concern for hemarthrosis in setting of chronic anticoagulation on DOAC. Hgb stable.  -  orthopedics recommendations reviewed, patient is pending arthrocentesis  - pain control with as needed acetaminophen or oxycodone  - hold anticoagulation but can resume as soon as arthrocentesis has been completed  - assist with ambulation  - PT/OT  -Fall precautions    CAP, partially treated  Chronic COPD  Diagnosed at PCP visit on 5/6/2022 and pt prescribed levofloxacin. He did have leukocytosis and cough at the time of MD visit. Took 2 doses, with last dose of abx yesterday. Given hospitalization and renal disease, will treat with Ceftriaxone and azithro while here.  - day 3 abx, first 2 days with 750mg po levofloxacin  - Ceftriaxone and azithro in hospital  - [facility] for cough  - continue PTA inhalers and nebs    Acute on chronic Chronic anemia 11.2--> 9.6g: He has a history of iron deficiency anemia and occult GI bleeding. Recent admissions with Hgb as low as 6.8 requiring transfusion. Recent EGD with erosive gastropathy but no stigmata of bleed. Colonoscopy showed diverticulosis and multiple AVM but no source of bleed, they were treated. [facility]  -- apixaban resumed on discharge in mid April. Holding now, resume post arthrocentesis.  -Check CBC in a.m.    Dementia (?)  - patient doesn't have noted history of this that I can see, but clearly has cognitive deficits versus is just very hard of hearing.  - OT for slums     CKD3: Creat ~ 1.1 on admission. Chronic, stable.  hyperchloremia w/ non anion gap metabolic acidosis probably secondary to IV fluid  -Stop IV fluids but he is normotensive can take a regular diet     Aortic stenosis s/p TAVR with bioprosthetic valve replacement for Aortic Stenosis in 2015; coronary angiogram at that time was negative for obstructive CAD. TTE 4/2021 showed preserved LVEF with mild to moderate prosthetic valve stenosis.   -Resume metoprolol but with tartrate twice daily instead of succinate with hold parameters     PAF, chronic:     -- Telemetry shows any signs of tachycardia    -- Resume Toprol as above with resolution of hypotension  -resume apixaban as above     Recurrent PE last in May 2021 noted.   -Holding anticoagulation but plan to resume ASAP post arthrocentesis     Dyslipidemia:   -Resume PTA Lipitor      Stage 4 Non-Hodgkins Lymphoma and MGUS: Followed by Dr. Zurita of [facility]. His lymphoma was diagnosed and treated with systemic chemotherapy in 2012 and reportedly has been in remission since. His MGUS is being monitored as an outpatient.    -- Noted     Chronic peripheral neuropathy:     --  B12 level recently checked; 431 and adequate    -- resume Cymbalta       Bullous pemphigoid and chronic pruritus: By history; noted.     AAA: 3.5 cm, seen on CT; close outpatient follow up      Suspected severe protein calorie malnutrition: Nutrition services consulted.    Covid negative  -On 5/8/2022       Diet: Combination Diet Regular Diet Adult    DVT Prophylaxis: Pneumatic Compression Devices  Suazo Catheter: Not present  Central Lines: None  Cardiac Monitoring: None  Code Status: Full Code      Disposition  Plan   Expected Discharge: When able to ambulate safely and/or safe disposition has been identified  Anticipated discharge location:  Awaiting care coordination huddle  Delays:    Yet identified       The patient's care was discussed with the Attending Physician, Dr. Silvia Rm, Bedside Nurse and Patient.    [facility]  Hospitalist Service  [facility]  Securely message with the Vocera Web Console (learn more here)  Text page via [facility] Paging/Directory         Clinically Significant Risk Factors Present on Admission     ______________________________________________________________________    Interval History   Patient reports pain control in his knee is better, he denies any chest pain, dyspnea reports he is moving his bowels, denies any bleeding    Data reviewed today: I reviewed all medications, new labs and imaging results over the last 24 hours. I personally reviewed no images or EKG's today.    Physical Exam   Vital Signs: Temp: 97.9  F (36.6  C) Temp src: Oral BP: 135/80 Pulse: 95   Resp: 18 SpO2: 94 % O2 Device: None (Room air) Oxygen Delivery: 2 LPM  Weight: 167 lbs 1.74 oz    General:  adult pt lying in bed without acute distress  Neuro: +follows commands wiggle toes and show 2 fingers bilat, face symmetric, tongue midline, speech fluent, very hard of hearing  Eyes pupils equal round 3mm briskly reactive bilat, sclera nonicteric, noninjected, conjunctiva pink,  Head, ENT & mouth: NC/AT,  mouth moist oral mucosa  Neck: supple  CV S1S2, no murmurs  resp: CTAB upper lobes  gi:normoactive bowel sounds, soft, nontender, nondisteded  Ext: no edema  Skin: no rashes on exposed skin  Lymph: Deferred  Musculoskeletal right lower extremity is in a knee immobilizer, effusion noted over the right knee    Data   Recent Labs   Lab 05/09/22  0917 05/08/22  2336 05/08/22  1223   WBC 8.1  --  10.7   HGB 9.6* 10.4* 11.2*   MCV 90  --  88     --  244   INR  --   --  1.99*      --  137   POTASSIUM 3.6  --  4.3   CHLORIDE 110*  --  105   CO2 18*  --  26   BUN 23  --  24   CR 0.92  --  1.13   ANIONGAP 7  --  6   AMRITA 8.6  --  9.4   GLC 92  --  127*     No results found for this or any previous visit (from the past 24 hour(s)).  Medications     - MEDICATION INSTRUCTIONS -         azithromycin  250 mg Oral Daily     cefTRIAXone  1 g Intravenous Q24H     DULoxetine  60 mg Oral Daily     famotidine  20 mg Oral Daily     fluticasone-vilanterol  1 puff Inhalation Daily     gabapentin  600 mg Oral At Bedtime     sodium chloride (PF)  3 mL Intracatheter Q8H

## 2022-05-09 NOTE — UTILIZATION REVIEW
Admission Status; Secondary Review Determination    Under the authority of the Utilization Management Committee, the utilization review process indicated a secondary review on the above patient. The review outcome is based on review of the medical records, discussions with staff, and applying clinical experience noted on the date of the review.    (x) Inpatient Status Appropriate - This patient's medical care is consistent with medical management for inpatient care and reasonable inpatient medical practice.    RATIONALE FOR DETERMINATION: 89-year-old male with history of significant degenerative joint disease of his knee with plans on a total knee arthroplasty within the next couple months, fell while getting out of a car resulting in significant increase swelling and pain to the point of patient unable to bear weight on his knee.  Patient has significant comorbidities including history of DVT and paroxysmal atrial fibrillation presently anticoagulated, recently diagnosed community-acquired pneumonia along with chronic COPD placed on antibiotics, history of iron deficiency anemia, aortic valve stenosis status post TAVR and clinically felt to have severe protein calorie malnutrition.  Due to severity of patient's pain and immobility with significant comorbidities, patient will require further management including needle aspiration of presumed hemarthrosis with follow-up assessment of pain control mobility prior to safe discharge appropriate for inpatient care.    At the time of admission with the information available to the attending physician more than 2 nights Hospital complex care was anticipated, based on patient risk of adverse outcome if treated as outpatient and complex care required. Inpatient admission is appropriate based on the Medicare guidelines.    This document was produced using voice recognition software    The information on this document is developed by the utilization review team in order for  the business office to ensure compliance. This only denotes the appropriateness of proper admission status and does not reflect the quality of care rendered.    The definitions of Inpatient Status and Observation Status used in making the determination above are those provided in the CMS Coverage Manual, Chapter 1 and Chapter 6, section 70.4.    Sincerely,    Willy Perea MD  Utilization Review  Physician Advisor  [facility].

## 2022-05-09 NOTE — TELEPHONE ENCOUNTER
Wife calling to [facility] PCP patient is in hospital due to waking up this AM with concussion symptoms after fall on 5/6.    Wife states pt is currently unable to have knee surgery d/t still healing from previous pneumonia infection, stated if pt doesn't have planned knee surgery and is placed in TCU, she is not sure if she can take care of him safely at home since she can't help him to the bathroom, walking etc due to needing 2 people currently to assist ambulation.     Wife asking if PCP is able to order Home Care or if PCP thinks patient should be placed temporarily in TCU if patient is unable to have knee surgery as planned

## 2022-05-09 NOTE — PLAN OF CARE
RECEIVING UNIT ED HANDOFF REVIEW    ED Nurse Handoff Report was reviewed by: Daja Peralta RN on May 8, 2022 at 10:11 PM

## 2022-05-10 ENCOUNTER — APPOINTMENT (OUTPATIENT)
Dept: PHYSICAL THERAPY | Facility: CLINIC | Age: 87
DRG: 553 | End: 2022-05-10
Attending: HOSPITALIST
Payer: COMMERCIAL

## 2022-05-10 ENCOUNTER — APPOINTMENT (OUTPATIENT)
Dept: OCCUPATIONAL THERAPY | Facility: CLINIC | Age: 87
DRG: 553 | End: 2022-05-10
Attending: HOSPITALIST
Payer: COMMERCIAL

## 2022-05-10 LAB
ERYTHROCYTE [DISTWIDTH] IN BLOOD BY AUTOMATED COUNT: 15.3 % (ref 10–15)
HCT VFR BLD AUTO: 30.2 % (ref 40–53)
HGB BLD-MCNC: 9.2 G/DL (ref 13.3–17.7)
MCH RBC QN AUTO: 25.4 PG (ref 26.5–33)
MCHC RBC AUTO-ENTMCNC: 30.5 G/DL (ref 31.5–36.5)
MCV RBC AUTO: 83 FL (ref 78–100)
PLATELET # BLD AUTO: 226 10E3/UL (ref 150–450)
RBC # BLD AUTO: 3.62 10E6/UL (ref 4.4–5.9)
WBC # BLD AUTO: 8.9 10E3/UL (ref 4–11)

## 2022-05-10 PROCEDURE — 250N000013 HC RX MED GY IP 250 OP 250 PS 637: Performed by: INTERNAL MEDICINE

## 2022-05-10 PROCEDURE — 120N000001 HC R&B MED SURG/OB

## 2022-05-10 PROCEDURE — 36415 COLL VENOUS BLD VENIPUNCTURE: CPT | Performed by: NURSE PRACTITIONER

## 2022-05-10 PROCEDURE — 85027 COMPLETE CBC AUTOMATED: CPT | Performed by: NURSE PRACTITIONER

## 2022-05-10 PROCEDURE — 99233 SBSQ HOSP IP/OBS HIGH 50: CPT | Performed by: INTERNAL MEDICINE

## 2022-05-10 PROCEDURE — 97166 OT EVAL MOD COMPLEX 45 MIN: CPT | Mod: GO | Performed by: OCCUPATIONAL THERAPIST

## 2022-05-10 PROCEDURE — 97161 PT EVAL LOW COMPLEX 20 MIN: CPT | Mod: GP

## 2022-05-10 PROCEDURE — 250N000013 HC RX MED GY IP 250 OP 250 PS 637: Performed by: NURSE PRACTITIONER

## 2022-05-10 PROCEDURE — 97530 THERAPEUTIC ACTIVITIES: CPT | Mod: GP

## 2022-05-10 PROCEDURE — 97535 SELF CARE MNGMENT TRAINING: CPT | Mod: GO | Performed by: OCCUPATIONAL THERAPIST

## 2022-05-10 PROCEDURE — 250N000013 HC RX MED GY IP 250 OP 250 PS 637: Performed by: HOSPITALIST

## 2022-05-10 RX ORDER — LEVOFLOXACIN 750 MG/1
750 TABLET, FILM COATED ORAL DAILY
Status: COMPLETED | OUTPATIENT
Start: 2022-05-10 | End: 2022-05-12

## 2022-05-10 RX ADMIN — ATORVASTATIN CALCIUM 10 MG: 10 TABLET, FILM COATED ORAL at 21:52

## 2022-05-10 RX ADMIN — METOPROLOL TARTRATE 6.25 MG: 25 TABLET, FILM COATED ORAL at 08:31

## 2022-05-10 RX ADMIN — FAMOTIDINE 20 MG: 20 TABLET ORAL at 08:31

## 2022-05-10 RX ADMIN — MELATONIN TAB 3 MG 3 MG: 3 TAB at 21:52

## 2022-05-10 RX ADMIN — AZITHROMYCIN MONOHYDRATE 250 MG: 250 TABLET ORAL at 08:31

## 2022-05-10 RX ADMIN — DULOXETINE 60 MG: 60 CAPSULE, DELAYED RELEASE ORAL at 08:31

## 2022-05-10 RX ADMIN — GABAPENTIN 600 MG: 300 CAPSULE ORAL at 21:51

## 2022-05-10 RX ADMIN — LEVOFLOXACIN 750 MG: 750 TABLET, FILM COATED ORAL at 08:31

## 2022-05-10 RX ADMIN — FLUTICASONE FUROATE AND VILANTEROL TRIFENATATE 1 PUFF: 200; 25 POWDER RESPIRATORY (INHALATION) at 08:36

## 2022-05-10 RX ADMIN — METOPROLOL TARTRATE 6.25 MG: 25 TABLET, FILM COATED ORAL at 21:52

## 2022-05-10 ASSESSMENT — ACTIVITIES OF DAILY LIVING (ADL)
ADLS_ACUITY_SCORE: 38
PREVIOUS_RESPONSIBILITIES: MEAL PREP;DRIVING;MEDICATION MANAGEMENT
ADLS_ACUITY_SCORE: 38
ADLS_ACUITY_SCORE: 34
ADLS_ACUITY_SCORE: 38

## 2022-05-10 NOTE — PROGRESS NOTES
[facility]    HOSPITALIST PROGRESS NOTE :   --------------------------------------------------    Date of Admission:  5/8/2022    Cumulative Summary: Hermilo Velasquez is a 89 year old male with past medical history of lung cancer s/p right lower lobe wedge resection, COPD, iron deficiency anemia, CKD 3, TAVR, PAF, recurrent PEs on chronic anticoagulation, hyperlipidemia, stage IV non-Hodgkin's lymphoma/MDS in remission, bullous pemphigoid, AAA who was admitted on May 8, 2022 for right knee pain and effusion after a fall 3 days ago.    Assessment & Plan     Right knee pain s/p fall  Right knee effusion, Hemarthrosis (5/8/2022)  Frequent falls    Fall on 5/6/2022 patient has been unable to ambulate and has had worsening swelling in R knee. Head CT non acute. XR knee shows DJD and moderate effusion. Concern for hemarthrosis in setting of chronic anticoagulation on DOAC. Hgb stable.    -- Patient care was assumed this morning , seen and examined  -- Orthopedics recommendations were reviewed, Arthrocentesis was done mostly concerning for hemarthrosis  --Orthopedic surgery has recommended to discharge him home and keeping him off Eliquis for the next few days.  --Currently patient is a scheduled for total knee replacement. ,  Ortho has recommended to continue patient on the current surgery plan, Dr. Deshpande is unable to accommodate moving up TKA surgery.  -- pain control with as needed acetaminophen or oxycodone, patient has use only 1 to 2 tablets of Tylenol in the last 24 hours.  --Will resume anticoagulation from tomorrow morning  --Continue to assist patient with ambulation.  --Physical and occupational therapy has been ordered, will follow up on their recommendations for safe disposition planning.  --Continue fall precautions.    CAP, partially treated  Chronic COPD  Diagnosed at PCP visit on 5/6/2022 and pt prescribed levofloxacin. He did have leukocytosis and cough at the time of MD  visit. Took 2 doses, with last dose of abx yesterday. Given hospitalization and renal disease, will treat with Ceftriaxone and azithro while here.    -- day 4 abx, first 2 days with 750mg po levofloxacin  -- Ceftriaxone and azithro in hospital  -- [facility] for cough  -- continue PTA inhalers and nebs  --As patient is pulling on his peripheral IV access, will discontinue IV ceftriaxone and will start patient on oral Levaquin to finish 7 day course while he will also be finishing his course of oral azithromycin.     Acute on chronic Chronic anemia 11.2--> 9.6g: He has a history of iron deficiency anemia and occult GI bleeding. Recent admissions with Hgb as low as 6.8 requiring transfusion. Recent EGD with erosive gastropathy but no stigmata of bleed. Colonoscopy showed diverticulosis and multiple AVM but no source of bleed, they were treated. Plan was for outpatient videocapsule study.  -- apixaban resumed on discharge in mid April. Holding now, plan to resume from tomorrow morning , orders are placed      Dementia (?)  -- patient doesn't have noted history of this that I can see, but clearly has cognitive deficits versus is just very hard of hearing.  -- OT for slums     CKD3: Creat ~ 1.1 on admission. Chronic, stable.  hyperchloremia w/ non anion gap metabolic acidosis probably secondary to IV fluid  --Stop IV fluids but he is normotensive can take a regular diet     Aortic stenosis s/p TAVR with bioprosthetic valve replacement for Aortic Stenosis in 2015; coronary angiogram at that time was negative for obstructive CAD. TTE 4/2021 showed preserved LVEF with mild to moderate prosthetic valve stenosis.   -Resumed metoprolol but with tartrate twice daily instead of succinate with hold parameters     PAF, chronic:   -- Telemetry shows mild tachycardia  -- Resumed BB as above with resolution of hypotension  -- Plan to resume AC from tomorrow morning      Recurrent PE last in May 2021 noted.   -- Holding  anticoagulation , plan to resume post arthrocentesis AC from tomorrow morning      Dyslipidemia:   --Resume PTA Lipitor      Stage 4 Non-Hodgkins Lymphoma and MGUS: Followed by Dr. Zurita of [facility]. His lymphoma was diagnosed and treated with systemic chemotherapy in 2012 and reportedly has been in remission since. His MGUS is being monitored as an outpatient.    -- Noted     Chronic peripheral neuropathy:     --  B12 level recently checked; 431 and adequate    -- resume Cymbalta       Bullous pemphigoid and chronic pruritus: By history; noted.     AAA: 3.5 cm, seen on CT; close outpatient follow up      Suspected severe protein calorie malnutrition: Nutrition services consulted.  -- will follow up on recommendations      Covid negative  --On 5/8/2022    Clinically Significant Risk Factors Present on Admission     Diet: Combination Diet Regular Diet Adult    Suazo Catheter: Not present  DVT Prophylaxis: DOAC  Code Status: Full Code    The patient's care was discussed with the Bedside Nurse, Patient and Patient's Family.    Disposition Plan   Expected Discharge: 05/11/2022     Anticipated discharge location:  Awaiting care coordination huddle   Tentative discharge tomorrow , OT is recommending discharge to TCU , will discuss the plan of care with wife Roberta , wife was not available when called , left the voice message on her personal cell phone .   35 min were spent in direct patient care today including the floor time , more than 50% of the time was spent in patient care coordination and counseling.      Silvia Rm MD, MD, FACP  Text Page (7am - 6pm)    ----------------------------------------------------------------------------------------------------------------------      Interval History   Patient care was assumed this morning , seen and examined, feeling well, seems to have some issues with cognition and memory but was pleasant with examination, he was unable to tell me where he lives and with whom  he lives  Denying pain  PIV and telemetry was discontinued early this morning due to patient pulling on wires and lines     -Data reviewed today: I reviewed all new labs and imaging results over the last 24 hours.    I personally reviewed no images or EKG's today.    Physical Exam   Temp: 98.6  F (37  C) Temp src: Oral BP: (!) 146/76 Pulse: 95   Resp: 18 SpO2: 90 % O2 Device: None (Room air)    Vitals:    05/08/22 2242   Weight: 75.8 kg (167 lb 1.7 oz)     Vital Signs with Ranges  Temp:  [97.7  F (36.5  C)-98.6  F (37  C)] 98.6  F (37  C)  Pulse:  [90-96] 95  Resp:  [18] 18  BP: (135-149)/(73-85) 146/76  SpO2:  [90 %-94 %] 90 %  I/O last 3 completed shifts:  In: 400 [I.V.:400]  Out: 1025 [Urine:1025]    GENERAL: Alert , awake and oriented. NAD. Conversational, appropriate.some cognition and memory deficit but otherwise cooperative with exam , Very [place]   HEENT: Normocephalic. EOMI. No icterus or injection. Nares normal.   LUNGS: Clear to auscultation. No dyspnea at rest.   HEART: Regular rate. Extremities perfused.   ABDOMEN: Soft, nontender, and nondistended. Positive bowel sounds.   EXTREMITIES: No LE edema noted. Right knee swelling is improved   NEUROLOGIC: Moves extremities x4 on command. No acute focal neurologic abnormalities noted.     Medications     - MEDICATION INSTRUCTIONS -         atorvastatin  10 mg Oral QPM     azithromycin  250 mg Oral Daily     DULoxetine  60 mg Oral Daily     famotidine  20 mg Oral Daily     fluticasone-vilanterol  1 puff Inhalation Daily     gabapentin  600 mg Oral At Bedtime     levofloxacin  750 mg Oral Daily     metoprolol tartrate  6.25 mg Oral BID     sodium chloride (PF)  3 mL Intracatheter Q8H       Data   Recent Labs   Lab 05/10/22  0717 05/09/22  0917 05/08/22  2336 05/08/22  1223   WBC 8.9 8.1  --  10.7   HGB 9.2* 9.6* 10.4* 11.2*   MCV 83 90  --  88    179  --  244   INR  --   --   --  1.99*   NA  --  135  --  137   POTASSIUM  --  3.6  --  4.3   CHLORIDE  --   110*  --  105   CO2  --  18*  --  26   BUN  --  23  --  24   CR  --  0.92  --  1.13   ANIONGAP  --  7  --  6   AMRITA  --  8.6  --  9.4   GLC  --  92  --  127*       Imaging:   Recent Results (from the past 24 hour(s))   XR Joint Aspiration Major Right    Narrative    EXAM: XR JOINT ASPIRATION MAJOR RIGHT                5/9/2022 2:58 PM        History:  Right knee aspiration - patient with likely hemarthrosis.     PROCEDURE: The risks (including bleeding, infection, and allergy to  contrast and medications) and benefits of the procedure were explained  to the patient and consent was obtained.  Using sterile technique and  fluoroscopic guidance, a #20 gauge needle was placed into the right  knee joint using an anterolateral approach.  Approximately 20 mL of  bloody fluid was then aspirated from the joint. Contrast was also  injected to confirm intra-articular position. Fluid sent to lab for  analysis. Estimated blood loss during the procedure was less than 5  mL. No initial complication.       Fluoroscopy time: 0.2 minutes  Images Obtained: 2  Medications used: Lidocaine allergy, 3 mL of Marcaine 0.5% 1 mL of  Isovue-M 200      Impression    IMPRESSION:  Technically successful right knee aspiration.    CHRISTY MCKEE PA-C         SYSTEM ID:  [id number]

## 2022-05-10 NOTE — PROVIDER NOTIFICATION
"MD Notification    Notified Person: MD    Notified Person Name: Jag    Notification Date/Time: 5/10 @ 7:30A    Notification Interaction: Web based ( not logged on to [facility])    Purpose of Notification: \"Pt constantly pulling Tele off. Pt also pulled PIV and was agitated when attempting re insertion. OK to leave tele off and PIV out?\"    Orders Received: OK for no PIV- no tele    Comments:    "

## 2022-05-10 NOTE — PLAN OF CARE
Goal Outcome Evaluation:    DATE & TIME: 05/10/2022, 4565-3185  Summary: Hemarthrosis of Right Knee, Anemia, Hx of falls   Cognitive Concerns/ Orientation : A&O x 1, only oriented to self. Confused during conversations. Speech illogical and scattered.   BEHAVIOR & AGGRESSION TOOL COLOR: GREEN  ABNL VS/O2: VSS on RA  MOBILITY: Heavy A x 2, gait belt and walker  PAIN MANAGMENT: Denies  DIET: Regular  BOWEL/BLADDER: Continent. Urinal and bedside commode.   ABNL LAB/BG: Hemoglobin 9.2 Hematocrit 3.2  DRAIN/DEVICES: No IV Access  TELEMETRY RHYTHM: Not on tele, discontinued.   SKIN: Right knee dressing CDI, swollen.   TESTS/PROCEDURES: TKA scheduled for future.  D/C DAY/GOALS/PLACE: Pending safe ambulation.   OTHER IMPORTANT INFO: Knee aspiration 5/9.

## 2022-05-10 NOTE — PLAN OF CARE
Goal Outcome Evaluation:      Summary: Hx of COPD, CKD, lung cx, Afib     Primary Diagnosis: Hemarthrosis, R knee effusion  Orientation: slightly confused at times  Aggression Stop Light: Green  Mobility: Bedrest  Pain Management: tylenol  Diet: Regular  Bowel/Bladder: incontinent  Abnormal Lab/Assessments: Hgb 10.4  Drain/Device/Wound: PIV  Consults: Ortho, OT/PT  D/C Day/Goals/Place: TBD    Shift Note: 5-9-22, 1729-8486    Getting knee aspirated today.  Up with heavy assist of 2 to lift. Knee immobilizer in place up on wedge. Needs a TKA but cannot move up the surgery. Tylenol given x 1 for back and leg pain. Denies nausea, and SOB. CMS intact. Discharge pending.

## 2022-05-10 NOTE — PLAN OF CARE
Goal Outcome Evaluation:        Shift Note: , 2350-4853    VSS on RA. Pt alert to self only. Very confused,needed reorientation . Denies pain, nausea. Pt pulled IV out, refused to be on tele monitoring. Very agitated and impulsive. Right knee swollen and dressing CDI.No BM.Discharge pending

## 2022-05-10 NOTE — CONSULTS
"CLINICAL NUTRITION SERVICES  -  ASSESSMENT NOTE      Future/Additional Recommendations:   Continue Regular Diet and encourage meals TID  Noted patient disoriented at present - may consider transition to Not Appropriate for Room Service status (would receive 3 standard meal trays)   Malnutrition:   % Weight Loss:  None noted  % Intake:  No decreased intake noted  Subcutaneous Fat Loss:  None observed  Muscle Loss:  Some age-related losses - does not meet malnutrition criteria   Fluid Retention:  None noted    Malnutrition Diagnosis: Patient does not meet two of the above criteria necessary for diagnosing malnutrition       REASON FOR ASSESSMENT  Hermilo Velasquez is a 89 year old male seen by Registered Dietitian for Admission Nutrition Risk Screen  Malnutrition Score: 3 (05/08/22 0756)  Have you recently lost weight without trying? Unsure  Have you been eating poorly because of a decreased appetite? Yes    Per chart review, PMH significant for dementia, CKD stage III, chronic COPD and Non-Hodgkin's lymphoma in remission.  Admitted 2/2 R knee pain and effusion after a fall.       NUTRITION HISTORY  - Information obtained from chart review and patient at bedside this morning.   - Patient recently seen by this writer last month (see note per chart review). Noted patient lives with his spouse who does all of the cooking and grocery shopping for patient. Eats 3 meals/day at baseline with a good appetite. Patient tells me today that his appetite has been \"great\" and continues to eat 3 meals daily at home.   - No known food allergies noted.       CURRENT NUTRITION ORDERS  Diet Order:     Regular     Current Intake/Tolerance:  Patient had 2 meals yesterday from the kitchen. Ordered a large meal for breakfast this morning - was still picking at his food during visit. Declines offer of supplements or other needs from nutrition services at this time.       NUTRITION FOCUSED PHYSICAL ASSESSMENT FOR DIAGNOSING " "MALNUTRITION)  Yes            Observed:    Some age-related losses noted    Obtained from Chart/Interdisciplinary Team:  \"Suspected severe protein calorie malnutrition\" per provider notes   Noted patient is scheduled for TKA - MD not able to accommodate moving surgery date up at this time.  R knee aspirated yesterday     ANTHROPOMETRICS  Height: 5' 10\"  Weight: 167 lbs 1.74 oz  Body mass index is 23.98 kg/m .  Weight Status:  Normal BMI  Weight History: No recent weight loss noted. Has actually trended up over the past year.   Wt Readings from Last 10 Encounters:   05/08/22 75.8 kg (167 lb 1.7 oz)   04/26/22 74.4 kg (164 lb)   04/13/22 75.6 kg (166 lb 10.7 oz)   03/10/22 76.7 kg (169 lb)   01/13/22 75.3 kg (166 lb)   11/30/21 73.5 kg (162 lb)   09/28/21 70.6 kg (155 lb 9.6 oz)   08/16/21 71.7 kg (158 lb)   07/27/21 71.2 kg (157 lb)   07/14/21 71.7 kg (158 lb)       LABS  Labs reviewed    MEDICATIONS  Medications reviewed      ASSESSED NUTRITION NEEDS PER APPROVED PRACTICE GUIDELINES:    Dosing Weight 76 kg (5/8)  Estimated Energy Needs: 3161-7529 kcals (25-30 Kcal/Kg)  Justification: maintenance  Estimated Protein Needs: 76-91+ grams protein (1-1.2+ g pro/Kg)  Justification: preservation of lean body mass  Estimated Fluid Needs: 2566-0683  mL (1 mL/Kcal)  Justification: maintenance or per provider pending fluid status      NUTRITION DIAGNOSIS:  Predicted inadequate nutrient intake (protein-calorie) related to tolerating diet with good appetite and PO intake at present but with potential for intake to decline pending medical course, LOS, menu fatigue, etc      NUTRITION INTERVENTIONS  Recommendations / Nutrition Prescription  Continue Regular Diet and encourage meals TID  Noted patient disoriented at present - may consider transition to Not Appropriate for Room Service status (would receive 3 standard meal trays)      Implementation  Nutrition education: Provided PO encouragement - encouraged patient to have 3 " meals/day with a source of protein on each.       Nutrition Goals  Patient will consume % of adequate meals TID       MONITORING AND EVALUATION:  Progress towards goals will be monitored and evaluated per protocol and Practice Guidelines      Dana Olson RD, LD  Gen Surg, Neuro & [address] Pager: [phone]

## 2022-05-10 NOTE — TELEPHONE ENCOUNTER
Patient Contact    Attempt # 1    Was call answered?  No.  Left message on voicemail with information to call back.    Yamilex EVERETT, Triage RN  [facility]

## 2022-05-10 NOTE — PROGRESS NOTES
05/10/22 0908   Quick Adds   Type of Visit Initial Occupational Therapy Evaluation   Living Environment   People in Home spouse   Current Living Arrangements house   Number of Stairs, Main Entrance 2   Number of Stairs, Within Home, Primary greater than 10 stairs   Living Environment Comments Uses a walk in shower in the basement, has a tub only on main level   Self-Care   Usual Activity Tolerance moderate   Current Activity Tolerance poor   Equipment Currently Used at Home walker, rolling;cane, straight   Fall history within last six months yes   Number of times patient has fallen within last six months 3   Activity/Exercise/Self-Care Comment Pt reports Mod (I) with ADL and mobility at baseline   Instrumental Activities of Daily Living (IADL)   Previous Responsibilities meal prep;driving;medication management   IADL Comments Pt reports wife shops and does household chores   General Information   Onset of Illness/Injury or Date of Surgery 05/08/22   Referring Physician Gracie Eller MD   Additional Occupational Profile Info/Pertinent History of Current Problem 89-year-old male with history of significant degenerative joint disease of his knee with plans on a total knee arthroplasty within the next couple months, fell while getting out of a car resulting in significant increase swelling and pain to the point of patient unable to bear weight on his knee.  Pt with R knee hemarthrosis and is s/p joint aspiration. Patient has significant comorbidities including history of DVT and paroxysmal atrial fibrillation presently anticoagulated, recently diagnosed community-acquired pneumonia along with chronic COPD placed on antibiotics, history of iron deficiency anemia, aortic valve stenosis status post TAVR   Existing Precautions/Restrictions fall   General Observations and Info Knee immobilizer on for mobility. Pt is VERY [place]   Cognitive Status Examination   Orientation Status person   Follows Commands follows  one-step commands;25-49% accuracy   Safety Deficit impulsivity;judgment;problem-solving   Cognitive Status Comments Pt intermittently oriented to place and situation but generally confused. [place] and no hearing aids here. Inconsistently answering questions and following directions. Tries to sit suddenly during mobility.   Sensory   Sensory Comments Able to feel light touch on extremities   Pain Assessment   Patient Currently in Pain Yes, see Vital Sign flowsheet   Posture   Posture forward head position   Posture Comments Difficulty standing fully upright and significantly flexed at trunk, knees, elbows.   Range of Motion Comprehensive   Comment, General Range of Motion BUE WFL shoulder ROM approx 110   Strength Comprehensive (MMT)   Comment, General Manual Muscle Testing (MMT) Assessment Impaired needing A of 2 for mobility   Muscle Tone Assessment   Muscle Tone Comments tremulous in standing   Coordination   Coordination Comments Needs cues for basic motor planning of how to move   Bed Mobility   Comment (Bed Mobility) Min A supine to EOB   Transfers   Transfer Comments A of 2 sit<>stand; A of 2 bed<>chair (with WW)   Balance   Balance Comments Very unsteady, tries to sit too soon, very high falls risk   Activities of Daily Living   BADL Assessment/Intervention lower body dressing;toileting   Lower Body Dressing Assessment/Training   [place] Level (Lower Body Dressing) maximum assist (25% patient effort)   Toileting   Comment, (Toileting) incontinent of stool   [place] Level (Toileting) maximum assist (25% patient effort);assist of 2   Clinical Impression   Criteria for Skilled Therapeutic Interventions Met (OT) Yes, treatment indicated   OT Diagnosis Impaired ADL and mobility   OT Problem List-Impairments impacting ADL problems related to;activity tolerance impaired;balance;cognition;coordination;mobility;motor control;range of motion (ROM);strength;pain;postural control   Assessment of Occupational  Performance 5 or more Performance Deficits   Identified Performance Deficits all ADL, mobility and IADL   Planned Therapy Interventions (OT) ADL retraining;IADL retraining;balance training;cognition;transfer training;risk factor education;progressive activity/exercise   Clinical Decision Making Complexity (OT) moderate complexity   Anticipated Equipment Needs Upon Discharge (OT) shower chair;walker, rolling   Risk & Benefits of therapy have been explained evaluation/treatment results reviewed;care plan/treatment goals reviewed;risks/benefits reviewed;current/potential barriers reviewed;participants voiced agreement with care plan;participants included;patient   OT Discharge Planning   OT Discharge Recommendation (DC Rec) Transitional Care Facility   OT Rationale for DC Rec Pt is a very heavy assist of 2 for transfers, toileting with commode. Incontinent of BM and intermittently quite confused. He is also very [place] without any hearing aids. Would not advise DC home in pts current condition.   Total Evaluation Time (Minutes)   Total Evaluation Time (Minutes) 10   OT Goals   Therapy Frequency (OT) 5 times/wk   OT Predicted Duration/Target Date for Goal Attainment 05/17/22   OT Goals Hygiene/Grooming;Upper Body Dressing;Lower Body Dressing;Cognition;Toilet Transfer/Toileting   OT: Hygiene/Grooming supervision/stand-by assist;while standing   OT: Upper Body Dressing Supervision/stand-by assist   OT: Lower Body Dressing Supervision/stand-by assist   OT: Toilet Transfer/Toileting Supervision/stand-by assist;cleaning and garment management;toilet transfer   OT: Cognitive Patient/caregiver will verbalize understanding of cognitive assessment results/recommendations as needed for safe discharge planning

## 2022-05-11 ENCOUNTER — APPOINTMENT (OUTPATIENT)
Dept: OCCUPATIONAL THERAPY | Facility: CLINIC | Age: 87
DRG: 553 | End: 2022-05-11
Payer: COMMERCIAL

## 2022-05-11 PROCEDURE — 250N000013 HC RX MED GY IP 250 OP 250 PS 637: Performed by: NURSE PRACTITIONER

## 2022-05-11 PROCEDURE — 120N000001 HC R&B MED SURG/OB

## 2022-05-11 PROCEDURE — 97535 SELF CARE MNGMENT TRAINING: CPT | Mod: GO | Performed by: OCCUPATIONAL THERAPIST

## 2022-05-11 PROCEDURE — 99232 SBSQ HOSP IP/OBS MODERATE 35: CPT | Performed by: INTERNAL MEDICINE

## 2022-05-11 PROCEDURE — 250N000013 HC RX MED GY IP 250 OP 250 PS 637: Performed by: INTERNAL MEDICINE

## 2022-05-11 PROCEDURE — 250N000013 HC RX MED GY IP 250 OP 250 PS 637: Performed by: HOSPITALIST

## 2022-05-11 PROCEDURE — 97530 THERAPEUTIC ACTIVITIES: CPT | Mod: GO | Performed by: OCCUPATIONAL THERAPIST

## 2022-05-11 RX ADMIN — ACETAMINOPHEN 650 MG: 325 TABLET ORAL at 23:53

## 2022-05-11 RX ADMIN — METOPROLOL TARTRATE 6.25 MG: 25 TABLET, FILM COATED ORAL at 08:59

## 2022-05-11 RX ADMIN — GABAPENTIN 600 MG: 300 CAPSULE ORAL at 22:33

## 2022-05-11 RX ADMIN — METOPROLOL TARTRATE 6.25 MG: 25 TABLET, FILM COATED ORAL at 20:11

## 2022-05-11 RX ADMIN — ACETAMINOPHEN 650 MG: 325 TABLET ORAL at 09:03

## 2022-05-11 RX ADMIN — ATORVASTATIN CALCIUM 10 MG: 10 TABLET, FILM COATED ORAL at 20:11

## 2022-05-11 RX ADMIN — ACETAMINOPHEN 650 MG: 325 TABLET ORAL at 16:58

## 2022-05-11 RX ADMIN — AZITHROMYCIN MONOHYDRATE 250 MG: 250 TABLET ORAL at 08:59

## 2022-05-11 RX ADMIN — APIXABAN 5 MG: 5 TABLET, FILM COATED ORAL at 08:59

## 2022-05-11 RX ADMIN — LEVOFLOXACIN 750 MG: 750 TABLET, FILM COATED ORAL at 08:59

## 2022-05-11 RX ADMIN — FAMOTIDINE 20 MG: 20 TABLET ORAL at 08:59

## 2022-05-11 RX ADMIN — APIXABAN 5 MG: 5 TABLET, FILM COATED ORAL at 20:11

## 2022-05-11 RX ADMIN — FLUTICASONE FUROATE AND VILANTEROL TRIFENATATE 1 PUFF: 200; 25 POWDER RESPIRATORY (INHALATION) at 09:06

## 2022-05-11 RX ADMIN — DULOXETINE 60 MG: 60 CAPSULE, DELAYED RELEASE ORAL at 08:59

## 2022-05-11 ASSESSMENT — ACTIVITIES OF DAILY LIVING (ADL)
ADLS_ACUITY_SCORE: 34

## 2022-05-11 NOTE — PROGRESS NOTES
Ortho      Not ready  For  Few  Weeks  For  Surgery  If then    Quit all  opiods    Will need  Nursing facility

## 2022-05-11 NOTE — TELEPHONE ENCOUNTER
Wife returning call. Relayed providers message, Wife verbalized agreement and will discuss the options when patient is closer to discharge.

## 2022-05-11 NOTE — PROGRESS NOTES
[facility]    HOSPITALIST PROGRESS NOTE :   --------------------------------------------------    Date of Admission:  5/8/2022    Cumulative Summary: Hermilo Velasquez is a 89 year old male with past medical history of lung cancer s/p right lower lobe wedge resection, COPD, iron deficiency anemia, CKD 3, TAVR, PAF, recurrent PEs on chronic anticoagulation, hyperlipidemia, stage IV non-Hodgkin's lymphoma/MDS in remission, bullous pemphigoid, AAA who was admitted on May 8, 2022 for right knee pain and effusion after a fall 3 days ago.    Assessment & Plan     Right knee pain s/p fall  Right knee effusion, Hemarthrosis (5/8/2022)  Frequent falls    Fall on 5/6/2022 patient has been unable to ambulate and has had worsening swelling in R knee. Head CT non acute. XR knee shows DJD and moderate effusion. Concern for hemarthrosis in setting of chronic anticoagulation on DOAC. Hgb stable.    -- Patient was seen and examined, feeling well, sitting in chair , eating breakfast , denying complaints   -- Orthopedics recommendations were reviewed, Arthrocentesis was done ,mostly concerning for hemarthrosis  --Orthopedic surgery has recommended to discharge him home and keeping him off Eliquis for the next few days.  --Currently patient is a scheduled for total knee replacement. ,  Ortho has recommended to continue patient on the current surgery plan, Dr. Deshpande is unable to accommodate moving up TKA surgery.  -- pain control with as needed acetaminophen or oxycodone, patient has use only 1 to 2 tablets of Tylenol in the last 24 hours.  --Will resume anticoagulation from today  --Continue to assist patient with ambulation.  --Physical and occupational therapy has been ordered, recommending STR , will benefit from STR with memory care due to dementia   --Continue fall precautions.    CAP, partially treated  Chronic COPD  Diagnosed at PCP visit on 5/6/2022 and pt prescribed levofloxacin. He did have  leukocytosis and cough at the time of MD visit. Took 2 doses, with last dose of abx yesterday. Given hospitalization and renal disease, will treat with Ceftriaxone and azithro while here.    -- day 5 abx, first 2 days with 750mg po levofloxacin  -- Ceftriaxone and azithro in hospital  -- [facility] for cough  -- continue PTA inhalers and nebs  --As patient is pulling on his peripheral IV access, will discontinue IV ceftriaxone and will start patient on oral Levaquin to finish 7 day course while he will also be finishing his course of oral azithromycin.     Acute on chronic Chronic anemia 11.2--> 9.6g: He has a history of iron deficiency anemia and occult GI bleeding. Recent admissions with Hgb as low as 6.8 requiring transfusion. Recent EGD with erosive gastropathy but no stigmata of bleed. Colonoscopy showed diverticulosis and multiple AVM but no source of bleed, they were treated. Plan was for outpatient videocapsule study.  -- apixaban resumed on discharge in mid April.Resuming today      Dementia (?)  -- patient doesn't have noted history of this that I can see, but clearly has cognitive deficits versus is just very hard of hearing.  -- OT for slums     CKD3: Creat ~ 1.1 on admission. Chronic, stable.  hyperchloremia w/ non anion gap metabolic acidosis probably secondary to IV fluid  -- Off IV fluids but he is normotensive can take a regular diet     Aortic stenosis s/p TAVR with bioprosthetic valve replacement for Aortic Stenosis in 2015; coronary angiogram at that time was negative for obstructive CAD. TTE 4/2021 showed preserved LVEF with mild to moderate prosthetic valve stenosis.   --Resumed metoprolol but with tartrate twice daily instead of succinate with hold parameters     PAF, chronic:   -- Telemetry shows mild tachycardia  -- Resumed BB as above with resolution of hypotension  -- Plan to resume AC from tomorrow morning      Recurrent PE last in May 2021 noted.   -- Holding anticoagulation , plan to  resume post arthrocentesis AC from tomorrow morning      Dyslipidemia:   --Resume PTA Lipitor      Stage 4 Non-Hodgkins Lymphoma and MGUS: Followed by Dr. Zurita of [facility]. His lymphoma was diagnosed and treated with systemic chemotherapy in 2012 and reportedly has been in remission since. His MGUS is being monitored as an outpatient.    -- Noted     Chronic peripheral neuropathy:     --  B12 level recently checked; 431 and adequate    -- resume Cymbalta       Bullous pemphigoid and chronic pruritus: By history; noted.     AAA: 3.5 cm, seen on CT; close outpatient follow up      Suspected severe protein calorie malnutrition: Nutrition services consulted.  -- will follow up on recommendations      Covid negative  --On 5/8/2022    Clinically Significant Risk Factors Present on Admission     Diet: Combination Diet Regular Diet Adult    Suazo Catheter: Not present  DVT Prophylaxis: DOAC  Code Status: Full Code      Disposition Plan   Expected Discharge: 05/11/2022     Anticipated discharge location:  Awaiting care coordination huddle   Tentative discharge later today if safe disposition is available  , OT is recommending discharge to TCU , left ,message for wife Roberta .    Silvia Rm MD, MD, FACP  Text Page (7am - 6pm)    ----------------------------------------------------------------------------------------------------------------------      Interval History    Patient was seen and examined, sitting in chair, feeling well, slightly hard of hearing, does not look uncomfortable, no coughing no shortness of breath.  Tolerating the medications well.  Discussed with him regarding plan for discharge to short-term rehab, unfortunately due to dementia patient is not able to answer all the questions.    -Data reviewed today: I reviewed all new labs and imaging results over the last 24 hours.    I personally reviewed no images or EKG's today.    Physical Exam   Temp: 98.6  F (37  C) Temp src: Oral BP: 114/64 Pulse:  91   Resp: 20 SpO2: (!) 89 % (RN notified) O2 Device: None (Room air)    Vitals:    05/08/22 2242   Weight: 75.8 kg (167 lb 1.7 oz)     Vital Signs with Ranges  Temp:  [98.4  F (36.9  C)-98.6  F (37  C)] 98.6  F (37  C)  Pulse:  [87-94] 91  Resp:  [17-20] 20  BP: (114-163)/(64-88) 114/64  SpO2:  [89 %-95 %] 89 %  I/O last 3 completed shifts:  In: 360 [P.O.:360]  Out: 875 [Urine:875]    GENERAL: Alert , awake and oriented. NAD. Conversational, appropriate.some cognition and memory deficit but otherwise cooperative with exam , Very [place]   HEENT: Normocephalic. EOMI. No icterus or injection. Nares normal.   LUNGS: Clear to auscultation. No dyspnea at rest.   HEART: Regular rate. Extremities perfused.   ABDOMEN: Soft, nontender, and nondistended. Positive bowel sounds.   EXTREMITIES: No LE edema noted. Right knee swelling is improved   NEUROLOGIC: Moves extremities x4 on command. No acute focal neurologic abnormalities noted.     Medications     - MEDICATION INSTRUCTIONS -         apixaban ANTICOAGULANT  5 mg Oral BID     atorvastatin  10 mg Oral QPM     azithromycin  250 mg Oral Daily     DULoxetine  60 mg Oral Daily     famotidine  20 mg Oral Daily     fluticasone-vilanterol  1 puff Inhalation Daily     gabapentin  600 mg Oral At Bedtime     levofloxacin  750 mg Oral Daily     metoprolol tartrate  6.25 mg Oral BID       Data   Recent Labs   Lab 05/10/22  0717 05/09/22  0917 05/08/22  2336 05/08/22  1223   WBC 8.9 8.1  --  10.7   HGB 9.2* 9.6* 10.4* 11.2*   MCV 83 90  --  88    179  --  244   INR  --   --   --  1.99*   NA  --  135  --  137   POTASSIUM  --  3.6  --  4.3   CHLORIDE  --  110*  --  105   CO2  --  18*  --  26   BUN  --  23  --  24   CR  --  0.92  --  1.13   ANIONGAP  --  7  --  6   AMRITA  --  8.6  --  9.4   GLC  --  92  --  127*       Imaging:   No results found for this or any previous visit (from the past 24 hour(s)).

## 2022-05-11 NOTE — PLAN OF CARE
Vitals stable. Illogical speech at times. Orientation fluctuates, when questions asked at beginning of shift pt only disorientated to time. Up to chair for a bit w/ 2 assist gb/walker pivot. Ate fair for supper. Continent, voiding per urinal. R knee swollen, incision dressing CDI. Transitioned to PO abx today, plan to resume Eliquis tomorrow. Discharge plan pending, will likely need TCU.

## 2022-05-11 NOTE — PLAN OF CARE
Pt calm/cooperative today.  Up to chair/BSC with david steady.  Voids in urinal, had BM today. Therapies following.  Right knee with some discomfort, tylenol given PRN.  Eating/drinking well.  Pt generally confused.  Spoke with pt's wife on phone for update today.  PCD's while in bed.  No IV access.  Awaiting placement, SWS following.

## 2022-05-11 NOTE — PLAN OF CARE
Goal Outcome Evaluation:  Vitals stable. Sleeping most of the night-Illogical speech at times. JEN orientation- he was very sleepy.  Assist x2 gb/walker pivot. Continent, voiding per urinal. Incision dressing CDI. Transitioned to PO abx today, plan to resume Eliquis today. Discharge plan pending, will likely need TCU.

## 2022-05-12 ENCOUNTER — APPOINTMENT (OUTPATIENT)
Dept: OCCUPATIONAL THERAPY | Facility: CLINIC | Age: 87
DRG: 553 | End: 2022-05-12
Payer: COMMERCIAL

## 2022-05-12 ENCOUNTER — APPOINTMENT (OUTPATIENT)
Dept: PHYSICAL THERAPY | Facility: CLINIC | Age: 87
DRG: 553 | End: 2022-05-12
Payer: COMMERCIAL

## 2022-05-12 PROCEDURE — 97116 GAIT TRAINING THERAPY: CPT | Mod: GP

## 2022-05-12 PROCEDURE — 250N000013 HC RX MED GY IP 250 OP 250 PS 637: Performed by: INTERNAL MEDICINE

## 2022-05-12 PROCEDURE — 97535 SELF CARE MNGMENT TRAINING: CPT | Mod: GO | Performed by: OCCUPATIONAL THERAPIST

## 2022-05-12 PROCEDURE — 250N000013 HC RX MED GY IP 250 OP 250 PS 637: Performed by: NURSE PRACTITIONER

## 2022-05-12 PROCEDURE — 120N000001 HC R&B MED SURG/OB

## 2022-05-12 PROCEDURE — 99232 SBSQ HOSP IP/OBS MODERATE 35: CPT | Performed by: INTERNAL MEDICINE

## 2022-05-12 PROCEDURE — 250N000013 HC RX MED GY IP 250 OP 250 PS 637: Performed by: HOSPITALIST

## 2022-05-12 PROCEDURE — 97530 THERAPEUTIC ACTIVITIES: CPT | Mod: GP

## 2022-05-12 RX ADMIN — APIXABAN 5 MG: 5 TABLET, FILM COATED ORAL at 20:07

## 2022-05-12 RX ADMIN — METOPROLOL TARTRATE 6.25 MG: 25 TABLET, FILM COATED ORAL at 20:07

## 2022-05-12 RX ADMIN — METOPROLOL TARTRATE 6.25 MG: 25 TABLET, FILM COATED ORAL at 07:57

## 2022-05-12 RX ADMIN — GABAPENTIN 600 MG: 300 CAPSULE ORAL at 22:42

## 2022-05-12 RX ADMIN — ACETAMINOPHEN 650 MG: 325 TABLET ORAL at 07:57

## 2022-05-12 RX ADMIN — FAMOTIDINE 20 MG: 20 TABLET ORAL at 07:58

## 2022-05-12 RX ADMIN — FLUTICASONE FUROATE AND VILANTEROL TRIFENATATE 1 PUFF: 200; 25 POWDER RESPIRATORY (INHALATION) at 07:57

## 2022-05-12 RX ADMIN — APIXABAN 5 MG: 5 TABLET, FILM COATED ORAL at 07:58

## 2022-05-12 RX ADMIN — LEVOFLOXACIN 750 MG: 750 TABLET, FILM COATED ORAL at 07:58

## 2022-05-12 RX ADMIN — ACETAMINOPHEN 650 MG: 325 TABLET ORAL at 22:42

## 2022-05-12 RX ADMIN — ATORVASTATIN CALCIUM 10 MG: 10 TABLET, FILM COATED ORAL at 20:07

## 2022-05-12 RX ADMIN — DULOXETINE 60 MG: 60 CAPSULE, DELAYED RELEASE ORAL at 07:58

## 2022-05-12 ASSESSMENT — ACTIVITIES OF DAILY LIVING (ADL)
ADLS_ACUITY_SCORE: 34
ADLS_ACUITY_SCORE: 38
ADLS_ACUITY_SCORE: 34
ADLS_ACUITY_SCORE: 38
ADLS_ACUITY_SCORE: 34
ADLS_ACUITY_SCORE: 38

## 2022-05-12 NOTE — PROGRESS NOTES
[facility]    HOSPITALIST PROGRESS NOTE :   --------------------------------------------------    Date of Admission:  5/8/2022    Cumulative Summary: Hermilo Velasquez is a 89 year old male with past medical history of lung cancer s/p right lower lobe wedge resection, COPD, iron deficiency anemia, CKD 3, TAVR, PAF, recurrent PEs on chronic anticoagulation, hyperlipidemia, stage IV non-Hodgkin's lymphoma/MDS in remission, bullous pemphigoid, AAA who was admitted on May 8, 2022 for right knee pain and effusion after a fall 3 days ago.    Assessment & Plan     Right knee pain s/p fall  Right knee effusion, Hemarthrosis (5/8/2022)  Frequent falls    Fall on 5/6/2022 patient has been unable to ambulate and has had worsening swelling in R knee. Head CT non acute. XR knee shows DJD and moderate effusion. Concern for hemarthrosis in setting of chronic anticoagulation on DOAC. Hgb stable.    -- Patient was seen and examined, sitting in chair, pleasantly confused , cooperative , denying any complaints   -- Orthopedics recommendations were reviewed, Arthrocentesis was done ,mostly concerning for hemarthrosis  --Orthopedic surgery has recommended to discharge him home and keeping him off Eliquis for the next few days, now restarted  --Currently patient is scheduled for total knee replacement. ,  Ortho has recommended to continue patient on the current surgery plan, Dr. Deshpande is unable to accommodate moving up TKA surgery.  -- pain control with as needed acetaminophen or oxycodone, patient has use only 1 to 2 tablets of Tylenol in the last 24 hours.  -- Anticoagulation is resumed   --Continue to assist patient with ambulation.  --Physical and occupational therapy has been ordered, recommending STR , will benefit from STR with memory care due to dementia   --Continue fall precautions.    CAP, partially treated  Chronic COPD  Diagnosed at PCP visit on 5/6/2022 and pt prescribed levofloxacin. He did have  leukocytosis and cough at the time of MD visit. Took 2 doses, with last dose of abx yesterday. Given hospitalization and renal disease, will treat with Ceftriaxone and azithro while here.    -- Patient has completed his antibiotic course with Levaquin  -- Initially was a started on IV ceftriaxone and IV azithromycin  --Due to dementia, patient was pulling on his peripheral IV axis, IV ceftriaxone was discontinued and patient was a started on oral Levaquin to finish total of 7-day course.  -- Guafenesin for cough  -- Continue PTA inhalers and nebs     Acute on chronic Chronic anemia 11.2--> 9.6g: He has a history of iron deficiency anemia and occult GI bleeding. Recent admissions with Hgb as low as 6.8 requiring transfusion. Recent EGD with erosive gastropathy but no stigmata of bleed. Colonoscopy showed diverticulosis and multiple AVM but no source of bleed, they were treated. Plan was for outpatient videocapsule study.  -- Patient has been tolerating apixaban which was a started in the middle of April.  Continue on current dose.     Dementia (?)  -- patient doesn't have noted history of this that I can see, but clearly has cognitive deficits versus is just very hard of hearing.  -- Patient was evaluated by Occupational Therapy, is recommended to be discharged to TCU at this point.  --Patient will also benefit from getting neurocognitive evaluation in an outpatient setting.     CKD3: Creat ~ 1.1 on admission. Chronic, stable.  hyperchloremia w/ non anion gap metabolic acidosis probably secondary to IV fluid  -- Off IV fluids but he is normotensive can take a regular diet     Aortic stenosis s/p TAVR with bioprosthetic valve replacement for Aortic Stenosis in 2015; coronary angiogram at that time was negative for obstructive CAD. TTE 4/2021 showed preserved LVEF with mild to moderate prosthetic valve stenosis.   --Resumed metoprolol but with tartrate twice daily instead of succinate with hold parameters, tolerating  well     PAF, chronic:   -- Telemetry shows mild tachycardia  -- Continue patient on beta-blocker and anticoagulation as above     Recurrent PE last in May 2021 noted.   -- Anticoagulation was resumed post arthrocentesis     Dyslipidemia:   --Resume PTA Lipitor      Stage 4 Non-Hodgkins Lymphoma and MGUS: Followed by Dr. Zurita of [facility]. His lymphoma was diagnosed and treated with systemic chemotherapy in 2012 and reportedly has been in remission since. His MGUS is being monitored as an outpatient.    -- Noted     Chronic peripheral neuropathy:     --  B12 level recently checked; 431 and adequate    -- resume Cymbalta       Bullous pemphigoid and chronic pruritus: By history; noted.     AAA: 3.5 cm, seen on CT; close outpatient follow up      Suspected severe protein calorie malnutrition: Nutrition services consulted.  -- will follow up on recommendations      Covid negative  --On 5/8/2022    Clinically Significant Risk Factors Present on Admission     Diet: Combination Diet Regular Diet Adult  Room Service    Suazo Catheter: Not present  DVT Prophylaxis: DOAC  Code Status: Full Code      Disposition Plan   Expected Discharge: 05/12/2022     Anticipated discharge location:  Awaiting care coordination huddle   Tentative discharge later today if safe disposition is available  , OT is recommending discharge to TCU , left ,message for wife Roberta , discussed with care coordinator    Silvia Rm MD, MD, FACP  Text Page (7am - 6pm)    ----------------------------------------------------------------------------------------------------------------------    Interval History    Patient was seen and examined, sitting in chair, feeling well, denying any complaints.  Denying any chest pain or shortness of breath, patient is done with his antibiotic course today.  Patient does remain pleasantly confused but cooperative, does not remember seeing me for the past couple of days.    -Data reviewed today: I reviewed all new  labs and imaging results over the last 24 hours.    I personally reviewed no images or EKG's today.    Physical Exam   Temp: 98.3  F (36.8  C) Temp src: Oral BP: 136/76 Pulse: 85   Resp: 18 SpO2: 93 % O2 Device: None (Room air)    Vitals:    05/08/22 2242   Weight: 75.8 kg (167 lb 1.7 oz)     Vital Signs with Ranges  Temp:  [98.3  F (36.8  C)-98.4  F (36.9  C)] 98.3  F (36.8  C)  Pulse:  [66-85] 85  Resp:  [18] 18  BP: (119-136)/(55-76) 136/76  SpO2:  [91 %-94 %] 93 %  I/O last 3 completed shifts:  In: 880 [P.O.:880]  Out: 450 [Urine:450]    GENERAL: Alert , awake and oriented. NAD. Conversational, appropriate.some cognition and memory deficit but otherwise cooperative with exam , Very [place]   HEENT: Normocephalic. EOMI. No icterus or injection. Nares normal.   LUNGS: Clear to auscultation. No dyspnea at rest.   HEART: Regular rate. Extremities perfused.   ABDOMEN: Soft, nontender, and nondistended. Positive bowel sounds.   EXTREMITIES: No LE edema noted. Right knee swelling is improved   NEUROLOGIC: Moves extremities x4 on command. No acute focal neurologic abnormalities noted.     Medications     - MEDICATION INSTRUCTIONS -         apixaban ANTICOAGULANT  5 mg Oral BID     atorvastatin  10 mg Oral QPM     DULoxetine  60 mg Oral Daily     famotidine  20 mg Oral Daily     fluticasone-vilanterol  1 puff Inhalation Daily     gabapentin  600 mg Oral At Bedtime     metoprolol tartrate  6.25 mg Oral BID       Data   Recent Labs   Lab 05/10/22  0717 05/09/22  0917 05/08/22  2336 05/08/22  1223   WBC 8.9 8.1  --  10.7   HGB 9.2* 9.6* 10.4* 11.2*   MCV 83 90  --  88    179  --  244   INR  --   --   --  1.99*   NA  --  135  --  137   POTASSIUM  --  3.6  --  4.3   CHLORIDE  --  110*  --  105   CO2  --  18*  --  26   BUN  --  23  --  24   CR  --  0.92  --  1.13   ANIONGAP  --  7  --  6   AMRITA  --  8.6  --  9.4   GLC  --  92  --  127*       Imaging:   No results found for this or any previous visit (from the past 24  hour(s)).     10-Kendell-2021

## 2022-05-12 NOTE — PLAN OF CARE
Pt doing well today.  Right knee continues to be swollen and sore.  Using tylenol and ice for knee discomfort.  Pt up to chair for meals with assist 1/GB/walker.  Had BM on commode this am, voiding in urinal.  Plan for discharge to TCU once place found.

## 2022-05-12 NOTE — CONSULTS
Care Management Initial Consult    General Information  Assessment completed with: Spouse or significant otherRoberta  Type of CM/SW Visit: Initial Assessment    Primary Care Provider verified and updated as needed: Yes   Readmission within the last 30 days: unable to assess      Reason for Consult: discharge planning  Advance Care Planning:            Communication Assessment  Patient's communication style: spoken language (English or Bilingual)    Hearing Difficulty or Deaf: yes   Wear Glasses or Blind: yes    Cognitive  Cognitive/Neuro/Behavioral: .WDL except  Level of Consciousness: confused, alert  Arousal Level: opens eyes spontaneously  Orientation: disoriented to, situation  Mood/Behavior: calm, cooperative  Best Language: 0 - No aphasia  Speech: clear, spontaneous, logical    Living Environment:   People in home: spouse  Roberta  Current living Arrangements: house      Able to return to prior arrangements: yes       Family/Social Support:  Care provided by: spouse/significant other  Provides care for: no one, unable/limited ability to care for self  Marital Status:   Wife, Children  Roberta       Description of Support System: Supportive, Involved    Support Assessment: Adequate family and caregiver support, Adequate social supports    Current Resources:   Patient receiving home care services:       Community Resources:    Equipment currently used at home: walker, rolling, cane, straight  Supplies currently used at home:      Employment/Financial:  Employment Status:          Financial Concerns:             Lifestyle & Psychosocial Needs:  Social Determinants of Health     Tobacco Use: Medium Risk     Smoking Tobacco Use: Former Smoker     Smokeless Tobacco Use: Never Used   Alcohol Use: Not on file   Financial Resource Strain: Not on file   Food Insecurity: Not on file   Transportation Needs: Not on file   Physical Activity: Not on file   Stress: Not on file   Social Connections: Not on file   Intimate  "Partner Violence: Not on file   Depression: Not at risk     PHQ-2 Score: 0   Housing Stability: Not on file       Functional Status:  Prior to admission patient needed assistance:              Mental Health Status:          Chemical Dependency Status:                Values/Beliefs:  Spiritual, Cultural Beliefs, Anabaptist Practices, Values that affect care:                 Additional Information:  Writer received consult for discharge planning. Writer reviewed notes and patient is confused at times so writer called patients spouse Roberta and introduced self and role. Roberta is in agreement for TCU placement at discharge. Roberta states she only wants him to go to [facility]. Writer explained we can try but are unsure of their bed availability. Writer gave more options ([facility], [facility]) and patients spouse states that if he cannot go to [facility], she would want him to go to [facility]. Writer explained the private room fee at [facility] and she states that she only wants a shared room because she does not want to pay an extra fee anywhere he goes. Writer explained that [facility] does not have many options for shared rooms but can send a referral to check. Writer explained that patient has been confused in the hospital and he may be better suited at a memory care TCU. Roberta states that she does not know why everyone is telling her this because he is not confused \"this is just Hermilo\". Writer provided active listening and emotional support to Roberta as she was voicing her concerns about placing him in a TCU. Writer brought up transportation to the facility and writer explained family can bring him if the nurse and medical team feels he is able to get in and out of the car otherwise we set up transportation and send through insurance. Writer went over the fees and Roberta states that she has no choice but to bring him in her own vehicle because the transport fee is way too expensive. Roberta states that " she will talk to patient about TCU's and where he would like to go at discharge and let the nurse know but SW is allowed to send referrals to [facility] and [facility]. Writer sent referrals via [facility].     SY Hoang

## 2022-05-12 NOTE — PLAN OF CARE
Goal Outcome Evaluation:      Shift Note: [id number]    Pt calm/cooperative today.VSS on RA. Pt disorient to sit only. Voids in urinal, no BM during shift. Right knee with some discomfort and swelling.PRN Tylenol given x1. Ice applied on the knee. Pt takes pills well.  Pt confused at times.Squaxin at times. No IV access.  Awaiting placement, SWS following.

## 2022-05-13 ENCOUNTER — APPOINTMENT (OUTPATIENT)
Dept: PHYSICAL THERAPY | Facility: CLINIC | Age: 87
DRG: 553 | End: 2022-05-13
Payer: COMMERCIAL

## 2022-05-13 VITALS
BODY MASS INDEX: 23.92 KG/M2 | WEIGHT: 167.11 LBS | RESPIRATION RATE: 16 BRPM | OXYGEN SATURATION: 96 % | TEMPERATURE: 98.1 F | HEART RATE: 64 BPM | SYSTOLIC BLOOD PRESSURE: 113 MMHG | DIASTOLIC BLOOD PRESSURE: 66 MMHG | HEIGHT: 70 IN

## 2022-05-13 PROCEDURE — 250N000013 HC RX MED GY IP 250 OP 250 PS 637: Performed by: NURSE PRACTITIONER

## 2022-05-13 PROCEDURE — 97116 GAIT TRAINING THERAPY: CPT | Mod: GP | Performed by: PHYSICAL THERAPIST

## 2022-05-13 PROCEDURE — 99238 HOSP IP/OBS DSCHRG MGMT 30/<: CPT | Performed by: INTERNAL MEDICINE

## 2022-05-13 PROCEDURE — 97530 THERAPEUTIC ACTIVITIES: CPT | Mod: GP | Performed by: PHYSICAL THERAPIST

## 2022-05-13 PROCEDURE — 250N000013 HC RX MED GY IP 250 OP 250 PS 637: Performed by: HOSPITALIST

## 2022-05-13 PROCEDURE — 250N000013 HC RX MED GY IP 250 OP 250 PS 637: Performed by: INTERNAL MEDICINE

## 2022-05-13 RX ADMIN — ACETAMINOPHEN 650 MG: 325 TABLET ORAL at 08:57

## 2022-05-13 RX ADMIN — FAMOTIDINE 20 MG: 20 TABLET ORAL at 08:53

## 2022-05-13 RX ADMIN — FLUTICASONE FUROATE AND VILANTEROL TRIFENATATE 1 PUFF: 200; 25 POWDER RESPIRATORY (INHALATION) at 09:04

## 2022-05-13 RX ADMIN — METOPROLOL TARTRATE 6.25 MG: 25 TABLET, FILM COATED ORAL at 08:53

## 2022-05-13 RX ADMIN — APIXABAN 5 MG: 5 TABLET, FILM COATED ORAL at 08:53

## 2022-05-13 RX ADMIN — DULOXETINE 60 MG: 60 CAPSULE, DELAYED RELEASE ORAL at 08:53

## 2022-05-13 ASSESSMENT — ACTIVITIES OF DAILY LIVING (ADL)
ADLS_ACUITY_SCORE: 38
ADLS_ACUITY_SCORE: 39
ADLS_ACUITY_SCORE: 38
ADLS_ACUITY_SCORE: 39
ADLS_ACUITY_SCORE: 35

## 2022-05-13 NOTE — PLAN OF CARE
Goal Outcome Evaluation:      [id number]  VSS on RA, Aox4. Denies nausea. C/O pain in right knee. PRN Tylenol given x1. R knee swollen. NO PIV in place. Pt [place]. Takes pills well. Uses urinal and bedside commode. No BM during shift.Discharge pending TCU placement.

## 2022-05-13 NOTE — PROGRESS NOTES
Care Management Follow Up    Length of Stay (days): 5    Expected Discharge Date: 05/13/2022     Concerns to be Addressed: discharge planning     Patient plan of care discussed at interdisciplinary rounds: Yes    Anticipated Discharge Disposition: Transitional Care     Anticipated Discharge Services: None  Anticipated Discharge DME: None    Patient/family educated on Medicare website which has current facility and service quality ratings: yes  Education Provided on the Discharge Plan:    Patient/Family in Agreement with the Plan: yes    Referrals Placed by CM/SW: Post Acute Facilities  Private pay costs discussed: Not applicable    Additional Information:  Writer spent time following up on the referrals.    [facility]: Left Voicemail: Call from Kathy at [facility] and stated they do have a semi private open ($11 private room fee) which is 3 walls and a curtain in a patient's own room and bath but would not be able to admit due to staffing today. Kathy will review and get back to writer. Declined   [facility]: Spoke to Carol Ann and will review.     Spoke to MD who stated patient may be able to discharge home if wife is agreeable. Writer called wife. Wife was agreeable for patient to go home with homecare, however, when asked if she could help patient she was unclear in her ability. Writer left VM for assigned PT asking that they see patient earlier in the day to determine if he would be safe at home with minimal assist.     Addendum: Writer spoke with patient's wife about placement. Per request of jensen- updated her that [facility] has covid in the building but not on TCU- spouse aware and still would be fine with patient going there. Discussed need still for TCU-spouse agreeable. Spouse agreeable for writer to send referral to [facility]. Writer updated [facility] and spoke to Carol Ann who needs nurse to call her for report. Writer updated bedside nurse.     Addendum 1430: Confirmed with [facility] they can take patient  today in a shared room and they will be able to help patient get inside if wife pulls up to main door. Writer spoke with wife- agreeable for discharge to [facility] and will get patient at door 2 at 1630. Writer updated [facility] for transport time. Writer paged MD for orders.    Writer updated charge RN, patient, Bedside nurse, and HUC . Orders faxed to TCU.     PIPO Manriquez, LGSW   Social Work   [facility]

## 2022-05-13 NOTE — PLAN OF CARE
Goal Outcome Evaluation:        Discharge Note    Patient discharged to TCU via private vehicle  accompanied by significant other .  IV: Discontinued  Prescriptions N/A.   Belongings reviewed and sent with patient.   Home medications returned to patient: NA  Equipment sent with: patient, N/A.   patient verbalizes understanding of discharge instructions. AVS given to patient.

## 2022-05-13 NOTE — DISCHARGE SUMMARY
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  No follow up labs or test are   needed.             Unresulted Labs Ordered in the Past 30 Days of this Admission     Date and Time Order Name Status Description    5/9/2022 11:12 AM Anaerobic bacterial culture Preliminary     5/9/2022 11:12 AM Synovial fluid Aerobic Bacterial Culture Routine with Gram Stain Preliminary       These results will be followed up by PCP and Orthopedic surgery     Discharge Disposition   Discharged to short-term care facility  Condition at discharge: Stable    Hospital Course     Cumulative Summary: Hermilo Velasquez is a 89 year old male with past medical history of lung cancer s/p right lower lobe wedge resection, COPD, iron deficiency anemia, CKD 3, TAVR, PAF, recurrent PEs on chronic anticoagulation, hyperlipidemia, stage IV non-Hodgkin's lymphoma/MDS in remission, bullous pemphigoid, AAA who was admitted on May 8, 2022 for right knee pain and effusion after a fall 3 days ago.  Here are further details regarding his current hospitalization     Right knee pain s/p fall  Right knee effusion, Hemarthrosis (5/8/2022)  Frequent falls     Fall on  5/6/2022 patient has been unable to ambulate and has had worsening swelling in R knee. Head CT non acute. XR knee shows DJD and moderate effusion. Concern for hemarthrosis in setting of chronic anticoagulation on DOAC. Hgb stable.     -- Patient was seen and examined, sitting in chair , cooperative , denying any complaints , seems to me much better oriented this morning to time, place and person   -- Orthopedics recommendations were reviewed, Arthrocentesis was done ,mostly concerning for hemarthrosis  -- Orthopedic surgery is ok with his discharge , initially Eliquis was held for few days and now has been restarted  -- Currently patient is scheduled for total knee replacement , Ortho has recommended to continue patient on the current surgery plan, Dr. Deshpande is unable to accommodate moving up TKA surgery.  -- pain control with as needed acetaminophen or oxycodone, patient has use only 1 to 2 tablets of Tylenol in the last 24 hours.  -- Anticoagulation is resumed   -- Continue to assist patient with ambulation.  -- Physical and occupational therapy has been ordered, recommending STR , plan to discharge this afternoon  -- Continue fall precautions.     CAP, partially treated  Chronic COPD  Diagnosed at PCP visit on 5/6/2022 and pt prescribed levofloxacin. He did have leukocytosis and cough at the time of MD visit. Took 2 doses, with last dose of abx yesterday. Given hospitalization and renal disease, will treat with Ceftriaxone and azithro while here.     --  Patient has completed his antibiotic course with Levaquin  --  Initially was a started on IV ceftriaxone and IV azithromycin  -- Due to dementia, patient was pulling on his peripheral IV axis, IV ceftriaxone was discontinued and patient was a started on oral Levaquin to finish total of 7-day course.  -- Guafenesin for cough  -- Continue PTA inhalers and nebs     Acute on chronic Chronic anemia 11.2--> 9.6g: He has a history of iron deficiency anemia and occult  GI bleeding. Recent admissions with Hgb as low as 6.8 requiring transfusion. Recent EGD with erosive gastropathy but no stigmata of bleed. Colonoscopy showed diverticulosis and multiple AVM but no source of bleed, they were treated. Plan was for outpatient videocapsule study.  -- Patient has been tolerating apixaban which was a started in the middle of April.  Continue on current dose.Hgb is stable      Probably Early dementia /cognitive decline :   -- patient doesn't have noted history of this that I can see, but clearly has cognitive deficits versus is just very hard of hearing.  -- Patient was evaluated by Occupational Therapy, is recommended to be discharged to TCU at this point.  --Patient will also benefit from getting neurocognitive evaluation in an outpatient setting.     CKD3: Creat ~ 1.1 on admission. Chronic, stable.  hyperchloremia w/ non anion gap metabolic acidosis probably secondary to IV fluid  -- Off IV fluids, creatinine is stable      Aortic stenosis s/p TAVR with bioprosthetic valve replacement for Aortic Stenosis in 2015; coronary angiogram at that time was negative for obstructive CAD. TTE 4/2021 showed preserved LVEF with mild to moderate prosthetic valve stenosis.   -- Continue PTA BB     PAF, chronic:   -- Telemetry shows mild tachycardia  -- Continue patient on beta-blocker and anticoagulation as above     Recurrent PE last in May 2021 noted.   -- Anticoagulation was resumed post arthrocentesis     Dyslipidemia:   --Resume PTA Lipitor      Stage 4 Non-Hodgkins Lymphoma and MGUS: Followed by Dr. Zurita of [facility]. His lymphoma was diagnosed and treated with systemic chemotherapy in 2012 and reportedly has been in remission since. His MGUS is being monitored as an outpatient.    -- Noted     Chronic peripheral neuropathy:     --  B12 level recently checked; 431 and adequate    -- resume Cymbalta       Bullous pemphigoid and chronic pruritus: By history; noted.     AAA: 3.5 cm, seen  on CT; close outpatient follow up      Suspected severe protein calorie malnutrition: Nutrition services consulted.  Patient did not have two criteria necessary for diagnosing malnutrition     Covid negative  --On 5/8/2022    Patient was seen and examined on the day of discharge , he is feeling well, does not have any complaints , I did review the discharge medications and instructions with the patient and plan for him to follow up with the PCP after the hospitalization .patient was in agreement , he is discharged in stable condition to TCU    Consultations This Hospital Stay   CARE MANAGEMENT / SOCIAL WORK IP CONSULT  PHYSICAL THERAPY ADULT IP CONSULT  OCCUPATIONAL THERAPY ADULT IP CONSULT  ORTHOPEDIC SURGERY IP CONSULT  PHYSICAL THERAPY ADULT IP CONSULT  OCCUPATIONAL THERAPY ADULT IP CONSULT    Code Status   Full Code    Time Spent on this Encounter   I, Silvia Rm MD, personally saw the patient today and spent less than or equal to 30 minutes discharging this patient.     Silvia Rm MD, [address], [address] [address]  Phone: [phone]  ______________________________________________________________________    Physical Exam   Vital Signs: Temp: 98.1  F (36.7  C) Temp src: Oral BP: 113/66 Pulse: 64   Resp: 16 SpO2: 96 % O2 Device: None (Room air)    Weight: 167 lbs 1.74 oz    Physical Exam  Vitals and nursing note reviewed.   Constitutional:       Appearance: He is well-developed.   HENT:      Head: Normocephalic and atraumatic.   Eyes:      Pupils: Pupils are equal, round, and reactive to light.   Neck:      Thyroid: No thyromegaly.   Cardiovascular:      Rate and Rhythm: Normal rate and regular rhythm.      Heart sounds: Normal heart sounds.   Pulmonary:      Effort: Pulmonary effort is normal. No respiratory distress.      Breath sounds: Normal breath sounds.   Abdominal:      General: Bowel sounds are normal. There is no distension.       Palpations: Abdomen is soft.   Musculoskeletal:         General: No tenderness. Normal range of motion.      Cervical back: Normal range of motion and neck supple.      Comments: Right knee is slightly swollen    Skin:     General: Skin is warm and dry.   Neurological:      Mental Status: He is alert and oriented to person, place, and time.   Psychiatric:         Behavior: Behavior normal.          Primary Care Physician   Karson Bishop    Discharge Orders      General info for SNF    Length of Stay Estimate: Short Term Care: Estimated # of Days <30  Condition at Discharge: Improving  Level of care:skilled   Rehabilitation Potential: Good  Admission H&P remains valid and up-to-date: Yes  Recent Chemotherapy: N/A  Use Nursing Home Standing Orders: Yes     Mantoux instructions    Give two-step Mantoux (PPD) Per Facility Policy Yes     Follow Up and recommended labs and tests    Follow up with Nursing home physician.  No follow up labs or test are needed.     Activity - Up with nursing assistance     Reason for your hospital stay    You were admitted to the hospital secondary to fall resulting into right knee pain, underwent drainage of right knee effusion showed hemarthrosis.  You were also treated for community-acquired pneumonia.     Additional Discharge Instructions     Full Code     Physical Therapy Adult Consult    Evaluate and treat as clinically indicated.    Reason: Acute illness debilitation     Occupational Therapy Adult Consult    Evaluate and treat as clinically indicated.    Reason: : Acute illness debilitation     Fall precautions     Advance Diet as Tolerated    Follow this diet upon discharge: Orders Placed This Encounter      Room Service      Combination Diet Regular Diet Adult         Significant Results and Procedures   Results for orders placed or performed during the hospital encounter of 05/08/22   XR Knee Right 3 Views    Narrative    EXAM: XR KNEE RIGHT 3 VIEWS  LOCATION: [facility]  DATE/TIME: 5/8/2022 12:26 PM    INDICATION: Fall, pain, swelling.  COMPARISON: None.      Impression    IMPRESSION: No fracture. Advanced degenerative changes in the medial compartment. Moderate-sized knee joint effusion. Slight flexion deformity of the knee. Moderate degenerative changes in the patellofemoral compartment.   CT Head w/o Contrast    Narrative    EXAM: CT HEAD W/O CONTRAST  LOCATION: [facility]  DATE/TIME: 5/8/2022 2:01 PM    INDICATION: Head trauma, minor (Age >= 65y)  COMPARISON: None.  TECHNIQUE: Routine CT Head without IV contrast. Multiplanar reformats. Dose reduction techniques were used.    FINDINGS:  INTRACRANIAL CONTENTS: No intracranial hemorrhage, extraaxial collection, or mass effect.  No CT evidence of acute infarct. Mild presumed chronic small vessel ischemic changes. Moderate generalized volume loss. No hydrocephalus.     VISUALIZED ORBITS/SINUSES/MASTOIDS: No intraorbital abnormality. No paranasal sinus mucosal disease. No middle ear or mastoid effusion.    BONES/SOFT TISSUES: No acute abnormality.      Impression    IMPRESSION:   1.  No CT evidence for acute intracranial process.  2.  Brain atrophy and presumed chronic microvascular ischemic changes as above.   XR Joint Aspiration Major Right    Narrative    EXAM: XR JOINT ASPIRATION MAJOR RIGHT                5/9/2022 2:58 PM        History:  Right knee aspiration - patient with likely hemarthrosis.     PROCEDURE: The risks (including bleeding, infection, and allergy to  contrast and medications) and benefits of the procedure were explained  to the patient and consent was obtained.  Using sterile technique and  fluoroscopic guidance, a #20 gauge needle was placed into the right  knee joint using an anterolateral approach.  Approximately 20 mL of  bloody fluid was then aspirated from the joint. Contrast was also  injected to confirm intra-articular position. Fluid sent to lab  for  analysis. Estimated blood loss during the procedure was less than 5  mL. No initial complication.       Fluoroscopy time: 0.2 minutes  Images Obtained: 2  Medications used: Lidocaine allergy, 3 mL of Marcaine 0.5% 1 mL of  Isovue-M 200      Impression    IMPRESSION:  Technically successful right knee aspiration.    CHRISTY MCKEE PA-C         SYSTEM ID:  [id number]     *Note: Due to a large number of results and/or encounters for the requested time period, some results have not been displayed. A complete set of results can be found in Results Review.       Discharge Medications   Current Discharge Medication List      CONTINUE these medications which have NOT CHANGED    Details   albuterol (PROAIR HFA/PROVENTIL HFA/VENTOLIN HFA) 108 (90 Base) MCG/ACT inhaler Inhale 1-2 puffs into the lungs every 6 hours as needed for shortness of breath / dyspnea or wheezing  Qty: 3 Inhaler, Refills: 5    Comments: Pharmacy may dispense brand covered by insurance (Proair, or proventil or ventolin or generic albuterol inhaler)  Associated Diagnoses: Chronic obstructive pulmonary disease, unspecified COPD type (H)      albuterol (PROVENTIL) (2.5 MG/3ML) 0.083% neb solution Take 1 vial (2.5 mg) by nebulization every 6 hours as needed for shortness of breath / dyspnea or wheezing  Qty: 180 mL, Refills: 11    Associated Diagnoses: Chronic obstructive pulmonary disease, unspecified COPD type (H)      apixaban ANTICOAGULANT (ELIQUIS) 5 MG tablet Take 5 mg by mouth in the morning and 5 mg in the evening.      atorvastatin (LIPITOR) 10 MG tablet Take 1 tablet (10 mg) by mouth daily  Qty: 90 tablet, Refills: 2    Associated Diagnoses: Mixed hyperlipidemia      DULoxetine (CYMBALTA) 60 MG capsule Take 60 mg by mouth daily      famotidine (PEPCID) 40 MG tablet Take 40 mg by mouth 2 times daily      ferrous sulfate (FE TABS) 325 (65 Fe) MG EC tablet Take 1 tablet (325 mg) by mouth daily  Qty: 90 tablet, Refills: 3    Associated Diagnoses:  "Iron deficiency anemia, unspecified iron deficiency anemia type; Anemia due to blood loss, acute      fluticasone-vilanterol (BREO ELLIPTA) 200-25 MCG/INH inhaler Inhale 1 puff into the lungs daily      gabapentin (NEURONTIN) 300 MG capsule Take 600 mg by mouth At Bedtime      hydrOXYzine (VISTARIL) 50 MG capsule Take 50 mg by mouth every 6 hours as needed for itching      metoprolol succinate ER (TOPROL-XL) 25 MG 24 hr tablet Take 0.5 tablets (12.5 mg) by mouth daily  Qty: 45 tablet, Refills: 0    Associated Diagnoses: Benign essential hypertension      triamcinolone (KENALOG) 0.1 % external cream Apply topically 2 times daily as needed for irritation  Qty: 453.6 g, Refills: 3    Associated Diagnoses: Chronic pruritus      ACE/ARB/ARNI NOT PRESCRIBED (INTENTIONAL) Please choose reason not prescribed from choices below.    Associated Diagnoses: Chronic diastolic congestive heart failure (H)         STOP taking these medications       levofloxacin (LEVAQUIN) 750 MG tablet Comments:   Reason for Stopping:             Allergies   Allergies   Allergen Reactions     Lidocaine Blisters     Allergy to lidocaine ointment     Omeprazole Itching     Pantoprazole Itching     Prevacid [Lansoprazole] Itching     Lasix [Furosemide] Rash     Penicillins Rash     \"broke out from injection\" 60 yrs ago  Tolerates cephalosporins     "

## 2022-05-13 NOTE — PLAN OF CARE
A&Ox4. VSS on Rm Air. C/o 5/10 pain to R knee. Mild swelling noted to R knee. PRN Tylenol, ice packs, and elevation administered w/ good effect. Reg diet; tolerating well. Continent of B/B. BM during shift. Asstx1 w/ GB/walker to bedside commode. Ambulated w/ staff assist & tolerated well. Pink blanchable skin to coccyx/sacrum; Mepiplex CDI. Pending discharge to [facility] potentially this evening. Plan discussed w/ pt & pt accepted.

## 2022-05-13 NOTE — PLAN OF CARE
Occupational Therapy Discharge Summary    Reason for therapy discharge:    Discharged to transitional care facility.    Progress towards therapy goal(s). See goals on Care Plan in [facility] electronic health record for goal details.  Goals partially met.  Barriers to achieving goals:   discharge from facility.    Therapy recommendation(s):    Continued therapy is recommended.  Rationale/Recommendations:  improve independence and safety with ADLS and functional mobility..

## 2022-05-14 DIAGNOSIS — Z71.89 OTHER SPECIFIED COUNSELING: ICD-10-CM

## 2022-05-14 LAB
BACTERIA SNV CULT: NO GROWTH
GRAM STAIN RESULT: NORMAL
GRAM STAIN RESULT: NORMAL

## 2022-05-14 NOTE — PLAN OF CARE
Physical Therapy Discharge Summary     Reason for therapy discharge:    Discharged to transitional care facility.     Progress towards therapy goal(s). See goals on Care Plan in [facility] electronic health record for goal details.  Goals not met.  Barriers to achieving goals:   discharge from facility.     Therapy recommendation(s):    Continued therapy is recommended.  Rationale/Recommendations:  Patient would benefit from PT eval at TCU in order to increase strength, activity tolerance, balance and independence with mobility.

## 2022-05-16 ENCOUNTER — TRANSITIONAL CARE UNIT VISIT (OUTPATIENT)
Dept: GERIATRICS | Facility: CLINIC | Age: 87
End: 2022-05-16
Payer: COMMERCIAL

## 2022-05-16 VITALS
TEMPERATURE: 98.5 F | RESPIRATION RATE: 18 BRPM | OXYGEN SATURATION: 97 % | BODY MASS INDEX: 31.79 KG/M2 | WEIGHT: 161.9 LBS | HEIGHT: 60 IN | DIASTOLIC BLOOD PRESSURE: 59 MMHG | HEART RATE: 74 BPM | SYSTOLIC BLOOD PRESSURE: 115 MMHG

## 2022-05-16 DIAGNOSIS — R53.81 PHYSICAL DECONDITIONING: ICD-10-CM

## 2022-05-16 DIAGNOSIS — I10 ESSENTIAL HYPERTENSION WITH GOAL BLOOD PRESSURE LESS THAN 140/90: ICD-10-CM

## 2022-05-16 DIAGNOSIS — M25.00 HEMARTHROSIS: ICD-10-CM

## 2022-05-16 DIAGNOSIS — J44.0 CHRONIC OBSTRUCTIVE PULMONARY DISEASE WITH ACUTE LOWER RESPIRATORY INFECTION (H): ICD-10-CM

## 2022-05-16 DIAGNOSIS — D62 ANEMIA DUE TO BLOOD LOSS, ACUTE: ICD-10-CM

## 2022-05-16 DIAGNOSIS — I48.0 PAROXYSMAL ATRIAL FIBRILLATION (H): ICD-10-CM

## 2022-05-16 DIAGNOSIS — M25.561 ACUTE PAIN OF RIGHT KNEE: Primary | ICD-10-CM

## 2022-05-16 DIAGNOSIS — Z86.711 HISTORY OF PULMONARY EMBOLISM: ICD-10-CM

## 2022-05-16 DIAGNOSIS — N18.30 STAGE 3 CHRONIC KIDNEY DISEASE, UNSPECIFIED WHETHER STAGE 3A OR 3B CKD (H): ICD-10-CM

## 2022-05-16 DIAGNOSIS — D50.0 IRON DEFICIENCY ANEMIA DUE TO CHRONIC BLOOD LOSS: ICD-10-CM

## 2022-05-16 DIAGNOSIS — C85.90 NON-HODGKIN'S LYMPHOMA, UNSPECIFIED BODY REGION, UNSPECIFIED NON-HODGKIN LYMPHOMA TYPE (H): ICD-10-CM

## 2022-05-16 PROCEDURE — 99310 SBSQ NF CARE HIGH MDM 45: CPT | Performed by: NURSE PRACTITIONER

## 2022-05-16 RX ORDER — ACETAMINOPHEN 500 MG
1000 TABLET ORAL 3 TIMES DAILY
Status: ON HOLD
Start: 2022-05-16 | End: 2022-07-28

## 2022-05-16 NOTE — LETTER
5/16/2022        RE: Hermilo Velasquez  [address]  [facility] [id number]        [facility] GERIATRICS    PRIMARY CARE PROVIDER AND CLINIC:  Karson Bishop MD, [address] [facility] [address] / [place] [address]  Chief Complaint   Patient presents with     Hospital F/U      [place] Medical Record Number:  [id number]  [facility]    Hermilo Velasquez  is a 89 year old  (11/3/1932), admitted to the above facility from  [facility]. Hospital stay 5/8/22 through 5/13/22..   HPI:    PMH of lung cancer s/p right LL wedge resection, COPD, PATIENCE, AS s/p TAVR, PAF, recurrent PE on AC, HLD, non hodgkins lymphoma/MDS in remission, bullous pemphigoid, AAA who was admitted for right knee pain.   Right knee pain after a fall  Right knee effusion, hemarthorsis s/p arthrocentesis   eliquis held but resumed by discharge  Patient is scheduled for TKA with Dr. Deshpande will proceed as planned, continue pain management and TCU for therapy  CAP/chronic COPD: diagnosed as OP at PCP on 5/6/22 Tx with levofloxacin, switched to IV ceftriaxone and azithromycin on admission, then back to oral levofloxacin to complete 7 day coarse.   Anemia: Hgb 11.2-9.6, Hx of PATIENCE and occult GI bleeding, EGD recent, plan for OP videocapsule study, follow Hgb, no transfusion required.   Cognitive impairment: recommend neurocognitive evaluation  CKD: creat 1.1 stable  On exam today: patient sitting up in chair at bedside, states pain in right knee is ongoing, rates pain as 7/10 at rest, states he is scheduled for surgery R TKA on 5/20/22, patient denies fever, chills, cough, congestion, CP, palpitations, N/V/D or constipation.     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 Vasques      Aortic stenosis     Severe AS, 9/2015 AVR - [facility] TRIFECTA Bovine bioprosthesis 25MM TF-25A     Atrial fibrillation (H)     9/2015 Paroxysmal post op Afib - discharged on Warfarin and a beta blocker     COPD (chronic obstructive pulmonary disease) (H)      Deep vein thrombosis (H)      GERD (gastroesophageal reflux disease)      Heart murmur      Monoclonal gammopathy     plasmacyte prominent causing monoclonal gammopathy     Need for SBE (subacute bacterial endocarditis) prophylaxis      Neuropathy      Other and unspecified hyperlipidemia      Other malignant lymphomas     non hodgkin's lymphoma     RBBB (right bundle branch block)      Severe sepsis with acute organ dysfunction (H) 11/16/2015     Unspecified hereditary and idiopathic peripheral neuropathy       PAST SURGICAL HISTORY:   has a past surgical history that includes appendectomy; tonsillectomy; knee surgery; rotator cuff repair rt/lt; turp; hernia repair (2006); Spine surgery; Repair ligament ankle (2/23/2012); Herniorrhaphy ventral (4/17/2013); Replace valve aortic (N/A, 9/3/2015); Esophagoscopy, gastroscopy, duodenoscopy (EGD), combined (N/A, 11/28/2015); TOTAL KNEE ARTHROPLASTY; back surgery; Esophagoscopy, gastroscopy, duodenoscopy (EGD), combined (N/A, 7/26/2018); Thoracotomy, wedge resection lung, combined (Right, 3/26/2019); Lobectomy lung (Right, 3/26/2019); Esophagoscopy, gastroscopy, duodenoscopy (EGD), combined (N/A, 8/17/2020); Esophagoscopy, gastroscopy, duodenoscopy (EGD), combined (N/A, 10/24/2020); and Esophagoscopy, gastroscopy, duodenoscopy (EGD), combined (N/A, 4/11/2022).  FAMILY HISTORY: family history includes C.A.D. in his " father; Emphysema in his father.  SOCIAL HISTORY:   reports that he quit smoking about 24 years ago. He has a 110.00 pack-year smoking history. He has never used smokeless tobacco. He reports current alcohol use. He reports that he does not use drugs.  Patient's living condition: lives with spouse    Post Discharge Medication Reconciliation Status: discharge medications reconciled, continue medications without change  Current Outpatient Medications   Medication Sig     acetaminophen (TYLENOL) 500 MG tablet Take 2 tablets (1,000 mg) by mouth 3 times daily     albuterol (PROAIR HFA/PROVENTIL HFA/VENTOLIN HFA) 108 (90 Base) MCG/ACT inhaler Inhale 1-2 puffs into the lungs every 6 hours as needed for shortness of breath / dyspnea or wheezing     albuterol (PROVENTIL) (2.5 MG/3ML) 0.083% neb solution Take 1 vial (2.5 mg) by nebulization every 6 hours as needed for shortness of breath / dyspnea or wheezing     apixaban ANTICOAGULANT (ELIQUIS) 5 MG tablet Take 5 mg by mouth in the morning and 5 mg in the evening.     atorvastatin (LIPITOR) 10 MG tablet Take 1 tablet (10 mg) by mouth daily     diclofenac (VOLTAREN) 1 % topical gel Apply 2 g topically 3 times daily     DULoxetine (CYMBALTA) 60 MG capsule Take 60 mg by mouth daily     famotidine (PEPCID) 40 MG tablet Take 40 mg by mouth 2 times daily     ferrous sulfate (FE TABS) 325 (65 Fe) MG EC tablet Take 1 tablet (325 mg) by mouth daily     fluticasone-vilanterol (BREO ELLIPTA) 200-25 MCG/INH inhaler Inhale 1 puff into the lungs daily     gabapentin (NEURONTIN) 300 MG capsule Take 600 mg by mouth At Bedtime     hydrOXYzine (VISTARIL) 50 MG capsule Take 50 mg by mouth every 6 hours as needed for itching     metoprolol succinate ER (TOPROL-XL) 25 MG 24 hr tablet Take 0.5 tablets (12.5 mg) by mouth daily     triamcinolone (KENALOG) 0.1 % external cream Apply topically 2 times daily as needed for irritation     ACE/ARB/ARNI NOT PRESCRIBED (INTENTIONAL) Please choose reason  not prescribed from choices below. (Patient not taking: Reported on 5/16/2022)     No current facility-administered medications for this visit.     Post Discharge Medication Reconciliation Status: discharge medications reconciled, continue medications without change.    ROS:  10 point ROS of systems including Constitutional, Eyes, Respiratory, Cardiovascular, Gastroenterology, Genitourinary, Integumentary, Musculoskeletal, Psychiatric were all negative except for pertinent positives noted in my HPI.    Vitals:  /59   Pulse 74   Temp 98.5  F (36.9  C)   Resp 18   Ht 1.524 m (5')   Wt 73.4 kg (161 lb 14.4 oz)   SpO2 97%   BMI 31.62 kg/m    Exam:  GENERAL APPEARANCE:  Alert, in no distress  ENT:  Mouth and posterior oropharynx normal, moist mucous membranes, [place]  EYES:  EOM, conjunctivae, lids, pupils and irises normal, PERRL  RESP:  respiratory effort and palpation of chest normal, lungs clear to auscultation , no respiratory distress  CV:  Palpation and auscultation of heart done , regular rate and rhythm, no murmur, rub, or gallop, peripheral edema trace+ in LE bilaterally  ABDOMEN:  normal bowel sounds, soft, nontender, no hepatosplenomegaly or other masses  M/S:   patient sitting up in chair at bedside  SKIN:  Inspection of skin and subcutaneous tissue baseline  NEURO:   speech wnl  PSYCH:  affect and mood normal    Lab/Diagnostic data:  Recent labs in [facility] reviewed by me today.  and   Most Recent 3 CBC's:  Recent Labs   Lab Test 05/10/22  0717 05/09/22  0917 05/08/22  2336 05/08/22  1223   WBC 8.9 8.1  --  10.7   HGB 9.2* 9.6* 10.4* 11.2*   MCV 83 90  --  88    179  --  244     Most Recent 3 BMP's:  Recent Labs   Lab Test 05/09/22  0917 05/08/22  1223 04/12/22  0754    137 138   POTASSIUM 3.6 4.3 4.3   CHLORIDE 110* 105 109   CO2 18* 26 26   BUN 23 24 17   CR 0.92 1.13 0.97   ANIONGAP 7 6 3   AMRITA 8.6 9.4 8.6   GLC 92 127* 102*       ASSESSMENT/PLAN:    (M25.561) Acute pain of right knee   (primary encounter diagnosis)  (M25.00) Hemarthrosis  (R53.81) Physical deconditioning  Comment: acute/ongoing s/p arthrocentesis  Plan: PT and OT, patient scheduled to have TKA with Dr. Deshpande on 5/20/22  Tylenol 1000mg TID scheduled and qhs prn  voltaren gel 1% 2gm to right knee TID      (J44.0) Chronic obstructive pulmonary disease with acute lower respiratory infection (H)  Comment: acute/ongoing  Plan: Tx with levaquin for CAP coarse complete  Albuterol nebs q 6 hours prn, breo ellipta 200/25mcg 1 puff daily  Monitor SaO2 at rest and with activity    (D62) Anemia due to blood loss, acute  (D50.0) Iron deficiency anemia due to chronic blood loss  Comment: acute/ongoing  Plan: follow Hgb, continue ferrous sulfate 325mg QD    (I10) Essential hypertension with goal blood pressure less than 140/90  (I48.0) Paroxysmal atrial fib -- on Eliquis   (N18.30) Stage 3 chronic kidney disease, unspecified whether stage 3a or 3b CKD (H)  Comment: ongoing  Plan: BMP follow, continue toprol xl 12.5mg QD, eliquis 5mg BID    (C85.90) Non-Hodgkin's lymphoma, unspecified body region, unspecified non-Hodgkin lymphoma type (H)  Comment: in remission  Plan: follows with Dr. Tatiana ALMAZAN    (Z86.711) History of pulmonary embolism  Comment: ongoing  Plan: continue eliquis 5mg BID    Orders:  BMP and Hgb on Thursday  Tylenol 1000mg TID scheduled and qhs prn  voltaren gel 1% 2gm to right knee TID scheduled      Total time spent with patient visit at the [facility] nursing facility was 35 min including patient visit and review of past records. Greater than 50% of total time spent with counseling and coordinating care due to discussed pain control, will schedule tylenol and add voltaren gel, encouraged patient to use ice prn throughout the day, discussed upcoming right TKA with Dr. Deshpande, likely will continue TCU stay until surgery and then possibly return to TCU after surgery.     Electronically signed by:  Tonya Lynn Haase, APRN CNP                      Sincerely,        Tonya Lynn Haase, APRN CNP

## 2022-05-16 NOTE — PROGRESS NOTES
"[facility] GERIATRICS    PRIMARY CARE PROVIDER AND CLINIC:  Karson Bishop MD, [address] / [place] [address]  Chief Complaint   Patient presents with     Hospital F/U      [facility] Medical Record Number:  [id number]  [facility]    Hermilo Velasquez  is a 89 year old  (11/3/1932), admitted to the above facility from  [facility]. Hospital stay 5/8/22 through 5/13/22..   HPI:    PMH of lung cancer s/p right LL wedge resection, COPD, PATIENCE, AS s/p TAVR, PAF, recurrent PE on AC, HLD, non hodgkins lymphoma/MDS in remission, bullous pemphigoid, AAA who was admitted for right knee pain.   Right knee pain after a fall  Right knee effusion, hemarthorsis s/p arthrocentesis   eliquis held but resumed by discharge  Patient is scheduled for TKA with Dr. Deshpande will proceed as planned, continue pain management and TCU for therapy  CAP/chronic COPD: diagnosed as OP at PCP on 5/6/22 Tx with levofloxacin, switched to IV ceftriaxone and azithromycin on admission, then back to oral levofloxacin to complete 7 day coarse.   Anemia: Hgb 11.2-9.6, Hx of PATIENCE and occult GI bleeding, EGD recent, plan for OP videocapsule study, follow Hgb, no transfusion required.   Cognitive impairment: recommend neurocognitive evaluation  CKD: creat 1.1 stable  On exam today: patient sitting up in chair at bedside, states pain in right knee is ongoing, rates pain as 7/10 at rest, states he is scheduled for surgery R TKA on 5/20/22, patient denies fever, chills, cough, congestion, CP, palpitations, N/V/D or constipation.     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 Vasques      Aortic stenosis     Severe AS, 9/2015 AVR - [facility] TRIFECTA Bovine bioprosthesis 25MM TF-25A     Atrial fibrillation (H)     9/2015 Paroxysmal post op Afib - discharged on Warfarin and a beta blocker     COPD (chronic obstructive pulmonary disease) (H)      Deep vein thrombosis (H)      GERD (gastroesophageal reflux disease)      Heart murmur      Monoclonal gammopathy     plasmacyte prominent causing monoclonal gammopathy     Need for SBE (subacute bacterial endocarditis) prophylaxis      Neuropathy      Other and unspecified hyperlipidemia      Other malignant lymphomas     non hodgkin's lymphoma     RBBB (right bundle branch block)      Severe sepsis with acute organ dysfunction (H) 11/16/2015     Unspecified hereditary and idiopathic peripheral neuropathy       PAST SURGICAL HISTORY:   has a past surgical history that includes appendectomy; tonsillectomy; knee surgery; rotator cuff repair rt/lt; turp; hernia repair (2006); Spine surgery; Repair ligament ankle (2/23/2012); Herniorrhaphy ventral (4/17/2013); Replace valve aortic (N/A, 9/3/2015); Esophagoscopy, gastroscopy, duodenoscopy (EGD), combined (N/A, 11/28/2015); TOTAL KNEE ARTHROPLASTY; back surgery; Esophagoscopy, gastroscopy, duodenoscopy (EGD), combined (N/A, 7/26/2018); Thoracotomy, wedge resection lung, combined (Right, 3/26/2019); Lobectomy lung (Right, 3/26/2019); Esophagoscopy, gastroscopy, duodenoscopy (EGD), combined (N/A, 8/17/2020); Esophagoscopy, gastroscopy, duodenoscopy (EGD), combined (N/A, 10/24/2020); and Esophagoscopy, gastroscopy, duodenoscopy (EGD), combined (N/A, 4/11/2022).  FAMILY HISTORY: family history includes C.A.D. in his father; Emphysema in his father.  SOCIAL HISTORY:   reports that he quit smoking about 24 years " ago. He has a 110.00 pack-year smoking history. He has never used smokeless tobacco. He reports current alcohol use. He reports that he does not use drugs.  Patient's living condition: lives with spouse    Post Discharge Medication Reconciliation Status: discharge medications reconciled, continue medications without change  Current Outpatient Medications   Medication Sig     acetaminophen (TYLENOL) 500 MG tablet Take 2 tablets (1,000 mg) by mouth 3 times daily     albuterol (PROAIR HFA/PROVENTIL HFA/VENTOLIN HFA) 108 (90 Base) MCG/ACT inhaler Inhale 1-2 puffs into the lungs every 6 hours as needed for shortness of breath / dyspnea or wheezing     albuterol (PROVENTIL) (2.5 MG/3ML) 0.083% neb solution Take 1 vial (2.5 mg) by nebulization every 6 hours as needed for shortness of breath / dyspnea or wheezing     apixaban ANTICOAGULANT (ELIQUIS) 5 MG tablet Take 5 mg by mouth in the morning and 5 mg in the evening.     atorvastatin (LIPITOR) 10 MG tablet Take 1 tablet (10 mg) by mouth daily     diclofenac (VOLTAREN) 1 % topical gel Apply 2 g topically 3 times daily     DULoxetine (CYMBALTA) 60 MG capsule Take 60 mg by mouth daily     famotidine (PEPCID) 40 MG tablet Take 40 mg by mouth 2 times daily     ferrous sulfate (FE TABS) 325 (65 Fe) MG EC tablet Take 1 tablet (325 mg) by mouth daily     fluticasone-vilanterol (BREO ELLIPTA) 200-25 MCG/INH inhaler Inhale 1 puff into the lungs daily     gabapentin (NEURONTIN) 300 MG capsule Take 600 mg by mouth At Bedtime     hydrOXYzine (VISTARIL) 50 MG capsule Take 50 mg by mouth every 6 hours as needed for itching     metoprolol succinate ER (TOPROL-XL) 25 MG 24 hr tablet Take 0.5 tablets (12.5 mg) by mouth daily     triamcinolone (KENALOG) 0.1 % external cream Apply topically 2 times daily as needed for irritation     ACE/ARB/ARNI NOT PRESCRIBED (INTENTIONAL) Please choose reason not prescribed from choices below. (Patient not taking: Reported on 5/16/2022)     No current  facility-administered medications for this visit.     Post Discharge Medication Reconciliation Status: discharge medications reconciled, continue medications without change.    ROS:  10 point ROS of systems including Constitutional, Eyes, Respiratory, Cardiovascular, Gastroenterology, Genitourinary, Integumentary, Musculoskeletal, Psychiatric were all negative except for pertinent positives noted in my HPI.    Vitals:  /59   Pulse 74   Temp 98.5  F (36.9  C)   Resp 18   Ht 1.524 m (5')   Wt 73.4 kg (161 lb 14.4 oz)   SpO2 97%   BMI 31.62 kg/m    Exam:  GENERAL APPEARANCE:  Alert, in no distress  ENT:  Mouth and posterior oropharynx normal, moist mucous membranes, [place]  EYES:  EOM, conjunctivae, lids, pupils and irises normal, PERRL  RESP:  respiratory effort and palpation of chest normal, lungs clear to auscultation , no respiratory distress  CV:  Palpation and auscultation of heart done , regular rate and rhythm, no murmur, rub, or gallop, peripheral edema trace+ in LE bilaterally  ABDOMEN:  normal bowel sounds, soft, nontender, no hepatosplenomegaly or other masses  M/S:   patient sitting up in chair at bedside  SKIN:  Inspection of skin and subcutaneous tissue baseline  NEURO:   speech wnl  PSYCH:  affect and mood normal    Lab/Diagnostic data:  Recent labs in [facility] reviewed by me today.  and   Most Recent 3 CBC's:  Recent Labs   Lab Test 05/10/22  0717 05/09/22  0917 05/08/22  2336 05/08/22  1223   WBC 8.9 8.1  --  10.7   HGB 9.2* 9.6* 10.4* 11.2*   MCV 83 90  --  88    179  --  244     Most Recent 3 BMP's:  Recent Labs   Lab Test 05/09/22  0917 05/08/22  1223 04/12/22  0754    137 138   POTASSIUM 3.6 4.3 4.3   CHLORIDE 110* 105 109   CO2 18* 26 26   BUN 23 24 17   CR 0.92 1.13 0.97   ANIONGAP 7 6 3   AMRITA 8.6 9.4 8.6   GLC 92 127* 102*       ASSESSMENT/PLAN:    (M25.561) Acute pain of right knee  (primary encounter diagnosis)  (M25.00) Hemarthrosis  (R53.81) Physical  deconditioning  Comment: acute/ongoing s/p arthrocentesis  Plan: PT and OT, patient scheduled to have TKA with Dr. Deshpande on 5/20/22  Tylenol 1000mg TID scheduled and qhs prn  voltaren gel 1% 2gm to right knee TID      (J44.0) Chronic obstructive pulmonary disease with acute lower respiratory infection (H)  Comment: acute/ongoing  Plan: Tx with levaquin for CAP coarse complete  Albuterol nebs q 6 hours prn, breo ellipta 200/25mcg 1 puff daily  Monitor SaO2 at rest and with activity    (D62) Anemia due to blood loss, acute  (D50.0) Iron deficiency anemia due to chronic blood loss  Comment: acute/ongoing  Plan: follow Hgb, continue ferrous sulfate 325mg QD    (I10) Essential hypertension with goal blood pressure less than 140/90  (I48.0) Paroxysmal atrial fib -- on Eliquis   (N18.30) Stage 3 chronic kidney disease, unspecified whether stage 3a or 3b CKD (H)  Comment: ongoing  Plan: BMP follow, continue toprol xl 12.5mg QD, eliquis 5mg BID    (C85.90) Non-Hodgkin's lymphoma, unspecified body region, unspecified non-Hodgkin lymphoma type (H)  Comment: in remission  Plan: follows with Dr. Tatiana ALMAZAN    (Z86.711) History of pulmonary embolism  Comment: ongoing  Plan: continue eliquis 5mg BID    Orders:  BMP and Hgb on Thursday  Tylenol 1000mg TID scheduled and qhs prn  voltaren gel 1% 2gm to right knee TID scheduled      Total time spent with patient visit at the skilled nursing facility was 35 min including patient visit and review of past records. Greater than 50% of total time spent with counseling and coordinating care due to discussed pain control, will schedule tylenol and add voltaren gel, encouraged patient to use ice prn throughout the day, discussed upcoming right TKA with Dr. Deshpande, likely will continue TCU stay until surgery and then possibly return to TCU after surgery.     Electronically signed by:  Tonya Lynn Haase, APRN CNP

## 2022-05-18 VITALS
TEMPERATURE: 97.3 F | HEART RATE: 66 BPM | RESPIRATION RATE: 16 BRPM | SYSTOLIC BLOOD PRESSURE: 122 MMHG | WEIGHT: 158.7 LBS | BODY MASS INDEX: 22.72 KG/M2 | OXYGEN SATURATION: 97 % | HEIGHT: 70 IN | DIASTOLIC BLOOD PRESSURE: 58 MMHG

## 2022-05-18 NOTE — PROGRESS NOTES
"[facility] GERIATRICS    Chief Complaint   Patient presents with     Nursing Home Acute     HPI:  Hermilo Velasquez is a 89 year old  (11/3/1932), who is being seen today for an episodic care visit at: [facility] ([facility]) [25]. Today's concern is:   Acute pain right knee: patient states pain in right knee is improving, rates pain at 4/10 in therapy patient walking up to 80 feet using a RW with CGA  COPD: no c/o cough, congestion, SOB, SaO2 92-95% on room air  Anemia: see labs  HTN/CKD/PAF: /56, 122/58, 123/51 with HR 60-70 range denies CP, palpitations, SOB    Allergies, and PMH/PSH reviewed in [facility] today.  REVIEW OF SYSTEMS:  10 point ROS of systems including Constitutional, Eyes, Respiratory, Cardiovascular, Gastroenterology, Genitourinary, Integumentary, Musculoskeletal, Psychiatric were all negative except for pertinent positives noted in my HPI.    Objective:   /58   Pulse 66   Temp 97.3  F (36.3  C)   Resp 16   Ht 1.778 m (5' 10\")   Wt 72 kg (158 lb 11.2 oz)   SpO2 97%   BMI 22.77 kg/m    GENERAL APPEARANCE:  Alert, in no distress  ENT:  Mouth and posterior oropharynx normal, moist mucous membranes, [place]  EYES:  EOM, conjunctivae, lids, pupils and irises normal, PERRL  RESP:  respiratory effort and palpation of chest normal, lungs clear to auscultation , no respiratory distress  CV:  Palpation and auscultation of heart done , regular rate and rhythm, no murmur, rub, or gallop, peripheral edema trace+ in LE bilaterally  ABDOMEN:  normal bowel sounds, soft, nontender, no hepatosplenomegaly or other masses  M/S:   patient sitting up in chair at bedside  SKIN:  Inspection of skin and subcutaneous tissue baseline, rash to lateral right knee, pinpoint, no redness or inflammation  NEURO:   speech wnl  PSYCH:  affect and mood normal    Recent labs in [facility] reviewed by me today.  and   Most Recent 3 CBC's:Recent Labs   Lab Test 05/10/22  0717 05/09/22  0917 05/08/22  2336 05/08/22  1223 "   WBC 8.9 8.1  --  10.7   HGB 9.2* 9.6* 10.4* 11.2*   MCV 83 90  --  88    179  --  244     Most Recent 3 BMP's:Recent Labs   Lab Test 05/09/22  0917 05/08/22  1223 04/12/22  0754    137 138   POTASSIUM 3.6 4.3 4.3   CHLORIDE 110* 105 109   CO2 18* 26 26   BUN 23 24 17   CR 0.92 1.13 0.97   ANIONGAP 7 6 3   AMRITA 8.6 9.4 8.6   GLC 92 127* 102*       Assessment/Plan:  (M25.561) Acute pain of right knee  (primary encounter diagnosis)  (M25.00) Hemarthrosis  (R53.81) Physical deconditioning  Comment: acute/ongoing s/p arthrocentesis  Plan: PT and OT, re scheduled RTKA with Dr. Deshpande for June  Tylenol 1000mg TID scheduled and qhs prn  DC voltaren gel 1% due to rash     (J44.0) Chronic obstructive pulmonary disease with acute lower respiratory infection (H)  Comment: acute/ongoing  Plan: Tx with levaquin for CAP coarse complete  Albuterol nebs q 6 hours prn, breo ellipta 200/25mcg 1 puff daily  Monitor SaO2 at rest and with activity     (D62) Anemia due to blood loss, acute  (D50.0) Iron deficiency anemia due to chronic blood loss  Comment: acute/ongoing  Plan: follow Hgb, continue ferrous sulfate 325mg QD     (I10) Essential hypertension with goal blood pressure less than 140/90  (I48.0) Paroxysmal atrial fib -- on Eliquis   (N18.30) Stage 3 chronic kidney disease, unspecified whether stage 3a or 3b CKD (H)  Comment: ongoing  Plan: BMP follow, continue toprol xl 12.5mg QD, eliquis 5mg BID     (C85.90) Non-Hodgkin's lymphoma, unspecified body region, unspecified non-Hodgkin lymphoma type (H)  Comment: in remission  Plan: follows with Dr. Tatiana ALMAZAN     (Z86.711) History of pulmonary embolism  Comment: ongoing  Plan: continue eliquis 5mg BID    Orders:  DC voltaren gel      Electronically signed by: Tonya Lynn Haase, APRN CNP

## 2022-05-19 ENCOUNTER — TRANSFERRED RECORDS (OUTPATIENT)
Dept: MULTI SPECIALTY CLINIC | Facility: CLINIC | Age: 87
End: 2022-05-19

## 2022-05-19 ENCOUNTER — TELEPHONE (OUTPATIENT)
Dept: GERIATRICS | Facility: CLINIC | Age: 87
End: 2022-05-19

## 2022-05-19 ENCOUNTER — TRANSFERRED RECORDS (OUTPATIENT)
Dept: HEALTH INFORMATION MANAGEMENT | Facility: CLINIC | Age: 87
End: 2022-05-19

## 2022-05-19 ENCOUNTER — TRANSITIONAL CARE UNIT VISIT (OUTPATIENT)
Dept: GERIATRICS | Facility: CLINIC | Age: 87
End: 2022-05-19
Payer: COMMERCIAL

## 2022-05-19 ENCOUNTER — MEDICAL CORRESPONDENCE (OUTPATIENT)
Dept: FAMILY MEDICINE | Facility: CLINIC | Age: 87
End: 2022-05-19

## 2022-05-19 DIAGNOSIS — D62 ANEMIA DUE TO BLOOD LOSS, ACUTE: ICD-10-CM

## 2022-05-19 DIAGNOSIS — M25.00 HEMARTHROSIS: ICD-10-CM

## 2022-05-19 DIAGNOSIS — M25.561 ACUTE PAIN OF RIGHT KNEE: Primary | ICD-10-CM

## 2022-05-19 DIAGNOSIS — J44.0 CHRONIC OBSTRUCTIVE PULMONARY DISEASE WITH ACUTE LOWER RESPIRATORY INFECTION (H): ICD-10-CM

## 2022-05-19 DIAGNOSIS — I48.0 PAROXYSMAL ATRIAL FIBRILLATION (H): ICD-10-CM

## 2022-05-19 DIAGNOSIS — Z86.711 HISTORY OF PULMONARY EMBOLISM: ICD-10-CM

## 2022-05-19 DIAGNOSIS — R53.81 PHYSICAL DECONDITIONING: ICD-10-CM

## 2022-05-19 DIAGNOSIS — C85.90 NON-HODGKIN'S LYMPHOMA, UNSPECIFIED BODY REGION, UNSPECIFIED NON-HODGKIN LYMPHOMA TYPE (H): ICD-10-CM

## 2022-05-19 DIAGNOSIS — N18.30 STAGE 3 CHRONIC KIDNEY DISEASE, UNSPECIFIED WHETHER STAGE 3A OR 3B CKD (H): ICD-10-CM

## 2022-05-19 DIAGNOSIS — D50.0 IRON DEFICIENCY ANEMIA DUE TO CHRONIC BLOOD LOSS: ICD-10-CM

## 2022-05-19 DIAGNOSIS — I10 ESSENTIAL HYPERTENSION WITH GOAL BLOOD PRESSURE LESS THAN 140/90: ICD-10-CM

## 2022-05-19 LAB
ANION GAP SERPL CALC-SCNC: 9 MMOL/L (ref 7–16)
BUN SERPL-MCNC: 19 MG/DL (ref 7–26)
CALCIUM (EXTERNAL): 8.4 MG/DL (ref 8.4–10.4)
CHLORIDE (EXTERNAL): 105 MMOL/L (ref 98–109)
CO2 (EXTERNAL): 26 MMOL/L (ref 20–29)
CREATININE (EXTERNAL): 1.1 MG/DL (ref 0.73–1.18)
GFR ESTIMATED (EXTERNAL): >60 ML/MIN/1.73M2
GLUCOSE (EXTERNAL): 87 MG/DL (ref 70–100)
HEMOGLOBIN (EXTERNAL): 10.1 G/DL (ref 13.5–17.5)
POTASSIUM (EXTERNAL): 4.6 MMOL/L (ref 3.5–5.1)
SODIUM (EXTERNAL): 140 MMOL/L (ref 136–145)

## 2022-05-19 PROCEDURE — 99309 SBSQ NF CARE MODERATE MDM 30: CPT | Performed by: NURSE PRACTITIONER

## 2022-05-19 NOTE — LETTER
"    5/19/2022        RE: Hermilo Velasquez  [address] [facility] [address]        [facility]    Chief Complaint   Patient presents with     Nursing Home Acute     HPI:  Hermilo Velasquez is a 89 year old  (11/3/1932), who is being seen today for an episodic care visit at: [facility]) [25]. Today's concern is:   Acute pain right knee: patient states pain in right knee is improving, rates pain at 4/10 in therapy patient walking up to 80 feet using a RW with CGA  COPD: no c/o cough, congestion, SOB, SaO2 92-95% on room air  Anemia: see labs  HTN/CKD/PAF: /56, 122/58, 123/51 with HR 60-70 range denies CP, palpitations, SOB    Allergies, and PMH/PSH reviewed in [facility] today.  REVIEW OF SYSTEMS:  10 point ROS of systems including Constitutional, Eyes, Respiratory, Cardiovascular, Gastroenterology, Genitourinary, Integumentary, Musculoskeletal, Psychiatric were all negative except for pertinent positives noted in my HPI.    Objective:   /58   Pulse 66   Temp 97.3  F (36.3  C)   Resp 16   Ht 1.778 m (5' 10\")   Wt 72 kg (158 lb 11.2 oz)   SpO2 97%   BMI 22.77 kg/m    GENERAL APPEARANCE:  Alert, in no distress  ENT:  Mouth and posterior oropharynx normal, moist mucous membranes, [place]  EYES:  EOM, conjunctivae, lids, pupils and irises normal, PERRL  RESP:  respiratory effort and palpation of chest normal, lungs clear to auscultation , no respiratory distress  CV:  Palpation and auscultation of heart done , regular rate and rhythm, no murmur, rub, or gallop, peripheral edema trace+ in LE bilaterally  ABDOMEN:  normal bowel sounds, soft, nontender, no hepatosplenomegaly or other masses  M/S:   patient sitting up in chair at bedside  SKIN:  Inspection of skin and subcutaneous tissue baseline, rash to lateral right knee, pinpoint, no redness or inflammation  NEURO:   speech wnl  PSYCH:  affect and mood normal    Recent labs in [facility] reviewed by me today.  and   Most " Recent 3 CBC's:Recent Labs   Lab Test 05/10/22  0717 05/09/22  0917 05/08/22  2336 05/08/22  1223   WBC 8.9 8.1  --  10.7   HGB 9.2* 9.6* 10.4* 11.2*   MCV 83 90  --  88    179  --  244     Most Recent 3 BMP's:Recent Labs   Lab Test 05/09/22  0917 05/08/22  1223 04/12/22  0754    137 138   POTASSIUM 3.6 4.3 4.3   CHLORIDE 110* 105 109   CO2 18* 26 26   BUN 23 24 17   CR 0.92 1.13 0.97   ANIONGAP 7 6 3   AMRITA 8.6 9.4 8.6   GLC 92 127* 102*       Assessment/Plan:  (M25.561) Acute pain of right knee  (primary encounter diagnosis)  (M25.00) Hemarthrosis  (R53.81) Physical deconditioning  Comment: acute/ongoing s/p arthrocentesis  Plan: PT and OT, re scheduled RTKA with Dr. Deshpande for June  Tylenol 1000mg TID scheduled and qhs prn  DC voltaren gel 1% due to rash     (J44.0) Chronic obstructive pulmonary disease with acute lower respiratory infection (H)  Comment: acute/ongoing  Plan: Tx with levaquin for CAP coarse complete  Albuterol nebs q 6 hours prn, breo ellipta 200/25mcg 1 puff daily  Monitor SaO2 at rest and with activity     (D62) Anemia due to blood loss, acute  (D50.0) Iron deficiency anemia due to chronic blood loss  Comment: acute/ongoing  Plan: follow Hgb, continue ferrous sulfate 325mg QD     (I10) Essential hypertension with goal blood pressure less than 140/90  (I48.0) Paroxysmal atrial fib -- on Eliquis   (N18.30) Stage 3 chronic kidney disease, unspecified whether stage 3a or 3b CKD (H)  Comment: ongoing  Plan: BMP follow, continue toprol xl 12.5mg QD, eliquis 5mg BID     (C85.90) Non-Hodgkin's lymphoma, unspecified body region, unspecified non-Hodgkin lymphoma type (H)  Comment: in remission  Plan: follows with Dr. Tatiana ALMAZAN     (Z86.701) History of pulmonary embolism  Comment: ongoing  Plan: continue eliquis 5mg BID    Orders:  PATTY voltaren gel      Electronically signed by: Tonya Lynn Haase, APRN CNP           Sincerely,        Tonya Lynn Haase, APRN CNP

## 2022-05-19 NOTE — TELEPHONE ENCOUNTER
"[facility] Geriatrics Lab Note     Provider: Tonya Haase, APRN CNP  Facility: [facility] Facility Type:  TCU    Allergies   Allergen Reactions     Lidocaine Blisters     Allergy to lidocaine ointment     Omeprazole Itching     Pantoprazole Itching     Prevacid [Lansoprazole] Itching     Lasix [Furosemide] Rash     Penicillins Rash     \"broke out from injection\" 60 yrs ago  Tolerates cephalosporins       Labs Reviewed by provider: YIN WNL and Hgb improved from 5/10 - 9.2. Currently on Iron and Eliquis.          Verbal Order/Direction given by Provider: NNO; provider notified of results.    Provider giving Order:  Tonya Haase, APRN CNP    Verbal Order given to: Elizabeth Aguiar RN  "

## 2022-05-20 ENCOUNTER — DISCHARGE SUMMARY NURSING HOME (OUTPATIENT)
Dept: GERIATRICS | Facility: CLINIC | Age: 87
End: 2022-05-20
Payer: COMMERCIAL

## 2022-05-20 VITALS
DIASTOLIC BLOOD PRESSURE: 56 MMHG | WEIGHT: 162.1 LBS | SYSTOLIC BLOOD PRESSURE: 125 MMHG | HEIGHT: 70 IN | BODY MASS INDEX: 23.21 KG/M2 | HEART RATE: 66 BPM | RESPIRATION RATE: 18 BRPM | OXYGEN SATURATION: 91 % | TEMPERATURE: 97.5 F

## 2022-05-20 DIAGNOSIS — I10 ESSENTIAL HYPERTENSION WITH GOAL BLOOD PRESSURE LESS THAN 140/90: ICD-10-CM

## 2022-05-20 DIAGNOSIS — Z86.711 HISTORY OF PULMONARY EMBOLISM: ICD-10-CM

## 2022-05-20 DIAGNOSIS — C85.90 NON-HODGKIN'S LYMPHOMA, UNSPECIFIED BODY REGION, UNSPECIFIED NON-HODGKIN LYMPHOMA TYPE (H): ICD-10-CM

## 2022-05-20 DIAGNOSIS — D50.0 IRON DEFICIENCY ANEMIA DUE TO CHRONIC BLOOD LOSS: ICD-10-CM

## 2022-05-20 DIAGNOSIS — I48.0 PAROXYSMAL ATRIAL FIBRILLATION (H): ICD-10-CM

## 2022-05-20 DIAGNOSIS — M25.00 HEMARTHROSIS: ICD-10-CM

## 2022-05-20 DIAGNOSIS — J44.0 CHRONIC OBSTRUCTIVE PULMONARY DISEASE WITH ACUTE LOWER RESPIRATORY INFECTION (H): ICD-10-CM

## 2022-05-20 DIAGNOSIS — M25.561 ACUTE PAIN OF RIGHT KNEE: Primary | ICD-10-CM

## 2022-05-20 DIAGNOSIS — N18.30 STAGE 3 CHRONIC KIDNEY DISEASE, UNSPECIFIED WHETHER STAGE 3A OR 3B CKD (H): ICD-10-CM

## 2022-05-20 DIAGNOSIS — R53.81 PHYSICAL DECONDITIONING: ICD-10-CM

## 2022-05-20 DIAGNOSIS — D62 ANEMIA DUE TO BLOOD LOSS, ACUTE: ICD-10-CM

## 2022-05-20 PROCEDURE — 99316 NF DSCHRG MGMT 30 MIN+: CPT | Performed by: NURSE PRACTITIONER

## 2022-05-20 NOTE — PROGRESS NOTES
[facility] GERIATRICS DISCHARGE SUMMARY  PATIENT'S NAME: Hermilo Velasquez  YOB: 1932  MEDICAL RECORD NUMBER:  [id number]  Place of Service where encounter took place:  [facility]) [25]    PRIMARY CARE PROVIDER AND CLINIC RESPONSIBLE AFTER TRANSFER:   Karson Bishop MD, [address] / [place] [address]    G Provider     Transferring providers: Tonya Lynn Haase, APRN CNP, Charles You MD  Recent Hospitalization/ED:  [facility] stay 5/8/22 to 5/13/22.  Date of SNF Admission: May 13, 2022  Date of SNF (anticipated) Discharge: May 20, 2022  Discharged to: previous independent home  Cognitive Scores: BIMS: 10/15 and Short blessed: 6/28  Physical Function: Ambulating 80 ft with RW  DME: Walker    CODE STATUS/ADVANCE DIRECTIVES DISCUSSION:  Full Code   ALLERGIES: Lidocaine, Omeprazole, Pantoprazole, Prevacid [lansoprazole], Lasix [furosemide], and Penicillins    NURSING FACILITY COURSE   Medication Changes/Rationale:     See assessment and plan    Summary of nursing facility stay:   Patient progressed in therapy to walking up to 80 feet using a RW with SBA/CGA, assist with ADL's, patient will DC to home to live with wife with home PT through [employer].     Discharge Medications:    Current Outpatient Medications   Medication Sig Dispense Refill     ACE/ARB/ARNI NOT PRESCRIBED (INTENTIONAL) Please choose reason not prescribed from choices below. (Patient not taking: Reported on 5/16/2022)       acetaminophen (TYLENOL) 500 MG tablet Take 2 tablets (1,000 mg) by mouth 3 times daily       albuterol (PROAIR HFA/PROVENTIL HFA/VENTOLIN HFA) 108 (90 Base) MCG/ACT inhaler Inhale 1-2 puffs into the lungs every 6 hours as needed for shortness of breath / dyspnea or wheezing 3 Inhaler 5     albuterol (PROVENTIL) (2.5 MG/3ML) 0.083% neb solution Take 1 vial (2.5 mg) by nebulization every 6 hours as needed for shortness of breath / dyspnea or  wheezing 180 mL 11     apixaban ANTICOAGULANT (ELIQUIS) 5 MG tablet Take 5 mg by mouth in the morning and 5 mg in the evening.       atorvastatin (LIPITOR) 10 MG tablet Take 1 tablet (10 mg) by mouth daily 90 tablet 2     DULoxetine (CYMBALTA) 60 MG capsule Take 60 mg by mouth daily       famotidine (PEPCID) 40 MG tablet Take 40 mg by mouth 2 times daily       ferrous sulfate (FE TABS) 325 (65 Fe) MG EC tablet Take 1 tablet (325 mg) by mouth daily 90 tablet 3     fluticasone-vilanterol (BREO ELLIPTA) 200-25 MCG/INH inhaler Inhale 1 puff into the lungs daily       gabapentin (NEURONTIN) 300 MG capsule Take 600 mg by mouth At Bedtime       hydrOXYzine (VISTARIL) 50 MG capsule Take 50 mg by mouth every 6 hours as needed for itching       metoprolol succinate ER (TOPROL-XL) 25 MG 24 hr tablet Take 0.5 tablets (12.5 mg) by mouth daily 45 tablet 0     triamcinolone (KENALOG) 0.1 % external cream Apply topically 2 times daily as needed for irritation 453.6 g 3          Controlled medications:   not applicable/none     Past Medical History:   Past Medical History:   Diagnosis Date     Adenocarcinoma, lung, 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 Vasques      Aortic stenosis     Severe AS, 9/2015 AVR - [facility] TRIFECTA Bovine bioprosthesis 25MM TF-25A     Atrial fibrillation (H)     9/2015 Paroxysmal post op Afib - discharged on Warfarin and a beta blocker     COPD (chronic obstructive pulmonary disease) (H)      Deep vein thrombosis (H)      GERD (gastroesophageal reflux disease)      Heart murmur      Monoclonal gammopathy     plasmacyte prominent causing monoclonal gammopathy     Need for SBE (subacute bacterial endocarditis) prophylaxis      Neuropathy      Other and unspecified hyperlipidemia      Other malignant lymphomas     non hodgkin's lymphoma     RBBB (right bundle branch block)      Severe sepsis with acute organ dysfunction (H) 11/16/2015     Unspecified hereditary and idiopathic peripheral  "neuropathy      Physical Exam:   Vitals: /56   Pulse 66   Temp 97.5  F (36.4  C)   Resp 18   Ht 1.778 m (5' 10\")   Wt 73.5 kg (162 lb 1.6 oz)   SpO2 91%   BMI 23.26 kg/m    BMI: Body mass index is 23.26 kg/m .  GENERAL APPEARANCE:  Alert, in no distress  ENT:  Mouth and posterior oropharynx normal, moist mucous membranes, [place]  EYES:  EOM, conjunctivae, lids, pupils and irises normal, PERRL  RESP:  respiratory effort and palpation of chest normal, lungs clear to auscultation , no respiratory distress  CV:  Palpation and auscultation of heart done , regular rate and rhythm, no murmur, rub, or gallop, peripheral edema trace+ in LE bilaterally  ABDOMEN:  normal bowel sounds, soft, nontender, no hepatosplenomegaly or other masses  M/S:   patient sitting up in chair  SKIN:  Inspection of skin and subcutaneous tissue baseline  NEURO:   speech wnl  PSYCH:  affect and mood normal     SNF labs: Recent labs in [facility] reviewed by me today.  and   Most Recent 3 CBC's:Recent Labs   Lab Test 05/10/22  0717 05/09/22  0917 05/08/22  2336 05/08/22  1223   WBC 8.9 8.1  --  10.7   HGB 9.2* 9.6* 10.4* 11.2*   MCV 83 90  --  88    179  --  244     Most Recent 3 BMP's:Recent Labs   Lab Test 05/19/22  0650 05/09/22  0917 05/08/22  1223 04/12/22  0754   NA  --  135 137 138   POTASSIUM  --  3.6 4.3 4.3   CHLORIDE 105 110* 105 109   CO2  --  18* 26 26   BUN  --  23 24 17   CR  --  0.92 1.13 0.97   ANIONGAP  --  7 6 3   AMRITA  --  8.6 9.4 8.6   GLC  --  92 127* 102*     Assessment/Plan:  (M25.561) Acute pain of right knee  (primary encounter diagnosis)  (M25.00) Hemarthrosis  (R53.81) Physical deconditioning  Comment: acute/ongoing s/p arthrocentesis  Plan: DC home with home PT,  re scheduled RTKA with Dr. Deshpande for June  Tylenol 1000mg TID scheduled and qhs prn     (J44.0) Chronic obstructive pulmonary disease with acute lower respiratory infection (H)  Comment: acute/ongoing  Plan: Tx with levaquin for CAP coarse " complete  Albuterol nebs q 6 hours prn, breo ellipta 200/25mcg 1 puff daily     (D62) Anemia due to blood loss, acute  (D50.0) Iron deficiency anemia due to chronic blood loss  Comment: acute/ongoing  Plan: continue ferrous sulfate 325mg QD  Hgb on 5/19/22 10.1 stable     (I10) Essential hypertension with goal blood pressure less than 140/90  (I48.0) Paroxysmal atrial fib -- on Eliquis   (N18.30) Stage 3 chronic kidney disease, unspecified whether stage 3a or 3b CKD (H)  Comment: ongoing  Plan: continue toprol xl 12.5mg QD, eliquis 5mg BID  Creat 1.10 on 5/19/22     (C85.90) Non-Hodgkin's lymphoma, unspecified body region, unspecified non-Hodgkin lymphoma type (H)  Comment: in remission  Plan: follows with Dr. Tatiana ALMAZAN     (Z86.711) History of pulmonary embolism  Comment: ongoing  Plan: continue eliquis 5mg BID    DISCHARGE PLAN:    Follow up labs: No labs orders/due    Medical Follow Up:      Follow up with primary care provider in 1-2 weeks    [facility] scheduled appointments:  Next 5 appointments (look out 90 days)    Jun 01, 2022  2:00 PM  (Arrive by 1:40 PM)  Provider Visit with Karson Bishop MD  [facility] ([facility] ) [address] [place] [address], [address]  [facility] [address] [phone]           Discharge Services: Home Care:  Physical Therapy    Discharge Instructions Verbalized to Patient at Discharge:     None    TOTAL DISCHARGE TIME:   Greater than 30 minutes  Electronically signed by:  Tonya Lynn Haase, APRN CNP     Home care Face to Face documentation done in EPIC attached to Home care orders for [facility]. , Documentation of Face to Face and Certification for Home Health Services    I certify that patient: Hermilo Velasquez is under my care and that I, or a nurse practitioner or physician's assistant working with me, had a face-to-face encounter that meets the physician face-to-face encounter requirements with this patient on:  5/20/2022.    This encounter with the patient was in whole, or in part, for the following medical condition, which is the primary reason for home health care: right knee pain.    I certify that, based on my findings, the following services are medically necessary home health services: Physical Therapy.    My clinical findings support the need for the above services because: Physical Therapy Services are needed to assess and treat the following functional impairments: strengthening, endurance, home safety.    Further, I certify that my clinical findings support that this patient is homebound (i.e. absences from home require considerable and taxing effort and are for medical reasons or Buddhist services or infrequently or of short duration when for other reasons) because: Requires assistance of another person or specialized equipment to access medical services because patient: Requires supervision of another for safe transfer...    Based on the above findings. I certify that this patient is confined to the home and needs intermittent skilled nursing care, physical therapy and/or speech therapy.  The patient is under my care, and I have initiated the establishment of the plan of care.  This patient will be followed by a physician who will periodically review the plan of care.  Physician/Provider to provide follow up care: Karson Bishop    Attending hospital physician (the Medicare certified PECOS provider): Tonya Lynn Haase, APRN CNP  Physician Signature: See electronic signature associated with these discharge orders.  Date: 5/20/2022

## 2022-05-20 NOTE — LETTER
5/20/2022        RE: Hermilo Velasquez  [address]  [facility] [address]        [facility] GERIATRICS DISCHARGE SUMMARY  PATIENT'S NAME: Hermilo Velasquez  YOB: 1932  MEDICAL RECORD NUMBER:  [id number]  [facility]    PRIMARY CARE PROVIDER AND CLINIC RESPONSIBLE AFTER TRANSFER:   Karson Bishop MD, [address] / [place] [address]    [facility] [place]     Transferring providers: Tonya Lynn Haase, APRN CNP, Charles You MD  Recent Hospitalization/ED:  [facility] stay 5/8/22 to 5/13/22.  Date of SNF Admission: May 13, 2022  Date of SNF (anticipated) Discharge: May 20, 2022  Discharged to: previous independent home  Cognitive Scores: BIMS: 10/15 and Short blessed: 6/28  Physical Function: Ambulating 80 ft with RW  DME: Walker    CODE STATUS/ADVANCE DIRECTIVES DISCUSSION:  Full Code   ALLERGIES: Lidocaine, Omeprazole, Pantoprazole, Prevacid [lansoprazole], Lasix [furosemide], and Penicillins    NURSING FACILITY COURSE   Medication Changes/Rationale:     See assessment and plan    Summary of nursing facility stay:   Patient progressed in therapy to walking up to 80 feet using a RW with SBA/CGA, assist with ADL's, patient will DC to home to live with wife with home PT through [facility]     Discharge Medications:    Current Outpatient Medications   Medication Sig Dispense Refill     ACE/ARB/ARNI NOT PRESCRIBED (INTENTIONAL) Please choose reason not prescribed from choices below. (Patient not taking: Reported on 5/16/2022)       acetaminophen (TYLENOL) 500 MG tablet Take 2 tablets (1,000 mg) by mouth 3 times daily       albuterol (PROAIR HFA/PROVENTIL HFA/VENTOLIN HFA) 108 (90 Base) MCG/ACT inhaler Inhale 1-2 puffs into the lungs every 6 hours as needed for shortness of breath / dyspnea or wheezing 3 Inhaler 5     albuterol (PROVENTIL) (2.5 MG/3ML) 0.083% neb solution Take  1 vial (2.5 mg) by nebulization every 6 hours as needed for shortness of breath / dyspnea or wheezing 180 mL 11     apixaban ANTICOAGULANT (ELIQUIS) 5 MG tablet Take 5 mg by mouth in the morning and 5 mg in the evening.       atorvastatin (LIPITOR) 10 MG tablet Take 1 tablet (10 mg) by mouth daily 90 tablet 2     DULoxetine (CYMBALTA) 60 MG capsule Take 60 mg by mouth daily       famotidine (PEPCID) 40 MG tablet Take 40 mg by mouth 2 times daily       ferrous sulfate (FE TABS) 325 (65 Fe) MG EC tablet Take 1 tablet (325 mg) by mouth daily 90 tablet 3     fluticasone-vilanterol (BREO ELLIPTA) 200-25 MCG/INH inhaler Inhale 1 puff into the lungs daily       gabapentin (NEURONTIN) 300 MG capsule Take 600 mg by mouth At Bedtime       hydrOXYzine (VISTARIL) 50 MG capsule Take 50 mg by mouth every 6 hours as needed for itching       metoprolol succinate ER (TOPROL-XL) 25 MG 24 hr tablet Take 0.5 tablets (12.5 mg) by mouth daily 45 tablet 0     triamcinolone (KENALOG) 0.1 % external cream Apply topically 2 times daily as needed for irritation 453.6 g 3          Controlled medications:   not applicable/none     Past Medical History:   Past Medical History:   Diagnosis Date     Adenocarcinoma, lung, 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 Vasques      Aortic stenosis     Severe AS, 9/2015 AVR - [facility] TRIFECTA Bovine bioprosthesis 25MM TF-25A     Atrial fibrillation (H)     9/2015 Paroxysmal post op Afib - discharged on Warfarin and a beta blocker     COPD (chronic obstructive pulmonary disease) (H)      Deep vein thrombosis (H)      GERD (gastroesophageal reflux disease)      Heart murmur      Monoclonal gammopathy     plasmacyte prominent causing monoclonal gammopathy     Need for SBE (subacute bacterial endocarditis) prophylaxis      Neuropathy      Other and unspecified hyperlipidemia      Other malignant lymphomas     non hodgkin's lymphoma     RBBB (right bundle branch block)      Severe sepsis with  "acute organ dysfunction (H) 11/16/2015     Unspecified hereditary and idiopathic peripheral neuropathy      Physical Exam:   Vitals: /56   Pulse 66   Temp 97.5  F (36.4  C)   Resp 18   Ht 1.778 m (5' 10\")   Wt 73.5 kg (162 lb 1.6 oz)   SpO2 91%   BMI 23.26 kg/m    BMI: Body mass index is 23.26 kg/m .  GENERAL APPEARANCE:  Alert, in no distress  ENT:  Mouth and posterior oropharynx normal, moist mucous membranes, [place]  EYES:  EOM, conjunctivae, lids, pupils and irises normal, PERRL  RESP:  respiratory effort and palpation of chest normal, lungs clear to auscultation , no respiratory distress  CV:  Palpation and auscultation of heart done , regular rate and rhythm, no murmur, rub, or gallop, peripheral edema trace+ in LE bilaterally  ABDOMEN:  normal bowel sounds, soft, nontender, no hepatosplenomegaly or other masses  M/S:   patient sitting up in chair  SKIN:  Inspection of skin and subcutaneous tissue baseline  NEURO:   speech wnl  PSYCH:  affect and mood normal     SNF labs: Recent labs in [facility] reviewed by me today.  and   Most Recent 3 CBC's:Recent Labs   Lab Test 05/10/22  0717 05/09/22  0917 05/08/22  2336 05/08/22  1223   WBC 8.9 8.1  --  10.7   HGB 9.2* 9.6* 10.4* 11.2*   MCV 83 90  --  88    179  --  244     Most Recent 3 BMP's:Recent Labs   Lab Test 05/19/22  0650 05/09/22  0917 05/08/22  1223 04/12/22  0754   NA  --  135 137 138   POTASSIUM  --  3.6 4.3 4.3   CHLORIDE 105 110* 105 109   CO2  --  18* 26 26   BUN  --  23 24 17   CR  --  0.92 1.13 0.97   ANIONGAP  --  7 6 3   AMRITA  --  8.6 9.4 8.6   GLC  --  92 127* 102*     Assessment/Plan:  (M25.561) Acute pain of right knee  (primary encounter diagnosis)  (M25.00) Hemarthrosis  (R53.81) Physical deconditioning  Comment: acute/ongoing s/p arthrocentesis  Plan: DC home with home PT,  re scheduled RTKA with Dr. Deshpande for June  Tylenol 1000mg TID scheduled and qhs prn     (J44.0) Chronic obstructive pulmonary disease with acute lower " respiratory infection (H)  Comment: acute/ongoing  Plan: Tx with levaquin for CAP coarse complete  Albuterol nebs q 6 hours prn, breo ellipta 200/25mcg 1 puff daily     (D62) Anemia due to blood loss, acute  (D50.0) Iron deficiency anemia due to chronic blood loss  Comment: acute/ongoing  Plan: continue ferrous sulfate 325mg QD  Hgb on 5/19/22 10.1 stable     (I10) Essential hypertension with goal blood pressure less than 140/90  (I48.0) Paroxysmal atrial fib -- on Eliquis   (N18.30) Stage 3 chronic kidney disease, unspecified whether stage 3a or 3b CKD (H)  Comment: ongoing  Plan: continue toprol xl 12.5mg QD, eliquis 5mg BID  Creat 1.10 on 5/19/22     (C85.90) Non-Hodgkin's lymphoma, unspecified body region, unspecified non-Hodgkin lymphoma type (H)  Comment: in remission  Plan: follows with Dr. Tatiana ALMAZAN     (Z86.711) History of pulmonary embolism  Comment: ongoing  Plan: continue eliquis 5mg BID    DISCHARGE PLAN:    Follow up labs: No labs orders/due    Medical Follow Up:      Follow up with primary care provider in 1-2 weeks    [facility] scheduled appointments:  Next 5 appointments (look out 90 days)    Jun 01, 2022  2:00 PM  (Arrive by 1:40 PM)  Provider Visit with Karson Bishop MD  [facility] ([facility] ) [address] [place] [address], [address]  [facility] [address] [phone]           Discharge Services: Home Care:  Physical Therapy    Discharge Instructions Verbalized to Patient at Discharge:     None    TOTAL DISCHARGE TIME:   Greater than 30 minutes  Electronically signed by:  Tonya Lynn Haase, APRN CNP     Home care Face to Face documentation done in EPIC attached to Home care orders for [facility]. , Documentation of Face to Face and Certification for Home Health Services    I certify that patient: Hermilo Velasquez is under my care and that I, or a nurse practitioner or physician's assistant working with me, had a face-to-face encounter  that meets the physician face-to-face encounter requirements with this patient on: 5/20/2022.    This encounter with the patient was in whole, or in part, for the following medical condition, which is the primary reason for home health care: right knee pain.    I certify that, based on my findings, the following services are medically necessary home health services: Physical Therapy.    My clinical findings support the need for the above services because: Physical Therapy Services are needed to assess and treat the following functional impairments: strengthening, endurance, home safety.    Further, I certify that my clinical findings support that this patient is homebound (i.e. absences from home require considerable and taxing effort and are for medical reasons or Christian services or infrequently or of short duration when for other reasons) because: Requires assistance of another person or specialized equipment to access medical services because patient: Requires supervision of another for safe transfer...    Based on the above findings. I certify that this patient is confined to the home and needs intermittent skilled nursing care, physical therapy and/or speech therapy.  The patient is under my care, and I have initiated the establishment of the plan of care.  This patient will be followed by a physician who will periodically review the plan of care.  Physician/Provider to provide follow up care: Karson Bishop    Attending hospital physician (the Medicare certified PECOS provider): Tonya Lynn Haase, APRN CNP  Physician Signature: See electronic signature associated with these discharge orders.  Date: 5/20/2022                  Sincerely,        Tonya Lynn Haase, APRN CNP

## 2022-05-23 LAB — BACTERIA SNV CULT: NORMAL

## 2022-06-01 ENCOUNTER — OFFICE VISIT (OUTPATIENT)
Dept: FAMILY MEDICINE | Facility: CLINIC | Age: 87
End: 2022-06-01
Payer: COMMERCIAL

## 2022-06-01 ENCOUNTER — TELEPHONE (OUTPATIENT)
Dept: FAMILY MEDICINE | Facility: CLINIC | Age: 87
End: 2022-06-01

## 2022-06-01 ENCOUNTER — HOSPITAL ENCOUNTER (OUTPATIENT)
Dept: CT IMAGING | Facility: CLINIC | Age: 87
Discharge: HOME | End: 2022-06-01
Attending: INTERNAL MEDICINE | Admitting: INTERNAL MEDICINE
Payer: COMMERCIAL

## 2022-06-01 VITALS
BODY MASS INDEX: 22.62 KG/M2 | HEART RATE: 53 BPM | TEMPERATURE: 97.7 F | SYSTOLIC BLOOD PRESSURE: 123 MMHG | RESPIRATION RATE: 16 BRPM | HEIGHT: 70 IN | WEIGHT: 158 LBS | DIASTOLIC BLOOD PRESSURE: 55 MMHG | OXYGEN SATURATION: 97 %

## 2022-06-01 DIAGNOSIS — S09.90XA INJURY OF HEAD, INITIAL ENCOUNTER: Primary | ICD-10-CM

## 2022-06-01 DIAGNOSIS — D50.0 IRON DEFICIENCY ANEMIA DUE TO CHRONIC BLOOD LOSS: ICD-10-CM

## 2022-06-01 DIAGNOSIS — D50.9 IRON DEFICIENCY ANEMIA, UNSPECIFIED IRON DEFICIENCY ANEMIA TYPE: ICD-10-CM

## 2022-06-01 DIAGNOSIS — M17.11 PRIMARY OSTEOARTHRITIS OF RIGHT KNEE: ICD-10-CM

## 2022-06-01 DIAGNOSIS — D62 ANEMIA DUE TO BLOOD LOSS, ACUTE: ICD-10-CM

## 2022-06-01 DIAGNOSIS — D64.9 ANEMIA, UNSPECIFIED TYPE: Primary | ICD-10-CM

## 2022-06-01 DIAGNOSIS — S09.90XA INJURY OF HEAD, INITIAL ENCOUNTER: ICD-10-CM

## 2022-06-01 DIAGNOSIS — K90.89 POOR IRON ABSORPTION: ICD-10-CM

## 2022-06-01 PROCEDURE — 70450 CT HEAD/BRAIN W/O DYE: CPT

## 2022-06-01 PROCEDURE — 99214 OFFICE O/P EST MOD 30 MIN: CPT | Performed by: INTERNAL MEDICINE

## 2022-06-01 RX ORDER — MEPERIDINE HYDROCHLORIDE 25 MG/ML
25 INJECTION INTRAMUSCULAR; INTRAVENOUS; SUBCUTANEOUS EVERY 30 MIN PRN
Status: CANCELLED | OUTPATIENT
Start: 2022-06-01

## 2022-06-01 RX ORDER — ALBUTEROL SULFATE 90 UG/1
1-2 AEROSOL, METERED RESPIRATORY (INHALATION)
Status: CANCELLED
Start: 2022-06-01

## 2022-06-01 RX ORDER — EPINEPHRINE 1 MG/ML
0.3 INJECTION, SOLUTION, CONCENTRATE INTRAVENOUS EVERY 5 MIN PRN
Status: CANCELLED | OUTPATIENT
Start: 2022-06-01

## 2022-06-01 RX ORDER — METHYLPREDNISOLONE SODIUM SUCCINATE 125 MG/2ML
125 INJECTION, POWDER, LYOPHILIZED, FOR SOLUTION INTRAMUSCULAR; INTRAVENOUS
Status: CANCELLED
Start: 2022-06-01

## 2022-06-01 RX ORDER — DIPHENHYDRAMINE HYDROCHLORIDE 50 MG/ML
50 INJECTION INTRAMUSCULAR; INTRAVENOUS
Status: CANCELLED
Start: 2022-06-01

## 2022-06-01 RX ORDER — HEPARIN SODIUM (PORCINE) LOCK FLUSH IV SOLN 100 UNIT/ML 100 UNIT/ML
5 SOLUTION INTRAVENOUS
Status: CANCELLED | OUTPATIENT
Start: 2022-06-01

## 2022-06-01 RX ORDER — ALBUTEROL SULFATE 0.83 MG/ML
2.5 SOLUTION RESPIRATORY (INHALATION) EVERY 6 HOURS PRN
Qty: 180 ML | Refills: 11 | Status: ON HOLD | OUTPATIENT
Start: 2022-06-01 | End: 2024-01-01

## 2022-06-01 RX ORDER — ATORVASTATIN CALCIUM 10 MG/1
10 TABLET, FILM COATED ORAL DAILY
Qty: 90 TABLET | Refills: 2 | Status: SHIPPED | OUTPATIENT
Start: 2022-06-01 | End: 2023-01-01

## 2022-06-01 RX ORDER — ALBUTEROL SULFATE 0.83 MG/ML
2.5 SOLUTION RESPIRATORY (INHALATION)
Status: CANCELLED | OUTPATIENT
Start: 2022-06-01

## 2022-06-01 RX ORDER — HEPARIN SODIUM,PORCINE 10 UNIT/ML
5 VIAL (ML) INTRAVENOUS
Status: CANCELLED | OUTPATIENT
Start: 2022-06-01

## 2022-06-01 RX ORDER — NALOXONE HYDROCHLORIDE 0.4 MG/ML
0.2 INJECTION, SOLUTION INTRAMUSCULAR; INTRAVENOUS; SUBCUTANEOUS
Status: CANCELLED | OUTPATIENT
Start: 2022-06-01

## 2022-06-01 ASSESSMENT — PAIN SCALES - GENERAL: PAINLEVEL: NO PAIN (0)

## 2022-06-01 NOTE — TELEPHONE ENCOUNTER
Karson Bishop MD  P Cs Triage Im  Please pend orders for ASAP iron infusion and I will sign them

## 2022-06-01 NOTE — RESULT ENCOUNTER NOTE
The following letter pertains to your most recent diagnostic tests:    Good news! The head CT does NOT show any bleeding or serious injury from your fall.        Sincerely,    Dr. Bishop

## 2022-06-01 NOTE — TELEPHONE ENCOUNTER
Writer huddled with PCP who would like infusion to be completed this week as patient has an upcoming procedure and preop appt next week.     Writer touched base with infusion center who states that [facility] infusion center does not have any openings this week but will reach out to other [facility] infusion centers to see if there are any openings this week.    Walker Ennis RN  [facility]

## 2022-06-01 NOTE — PROGRESS NOTES
Assessment & Plan     Injury of head, initial encounter  Check head CT this afternoon  - CT Head w/o Contrast; Future    Iron deficiency anemia due to chronic blood loss  Arrange for prompt iron infusion to secure a good hemoglobin level at the time of the preop evaluation next week  His oncologist did contact me several weeks ago stating that if iron infusions cannot keep his hemoglobin up, there should be some consideration given to treating his lymphoma     Poor iron absorption  See above    Primary osteoarthritis of right knee  Again we discussed the potential risks of his surgery given his multiple medical conditions  The surgery is going to require a long recovery and the risk for complications is more elevated for him then and all comers  He seems to understand this  Understanding this, he seems very dedicated to proceeding with surgery as the outlook for his quality of life should he not pursue surgery does not seem tolerable for him given the extent of pain that he is enduring from his right knee osteoarthritis.  His wife was involved with this discussion as well        30 minutes spent on the date of the encounter doing chart review, history and exam, documentation and further activities per the note           No follow-ups on file.    Karson Bishop MD  [employer] RENETTA Akhtar is a 89 year old who presents for the following health issues  accompanied by his spouse.    HPI       Chief Complaint   Patient presents with     Follow Up       Years.  He has a complicated medical history including a history of non-Hodgkin's lymphoma, resected lung adenocarcinoma, COPD, aortic stenosis, atrial fibrillation, history of pulmonary embolism, chronic anemia due to chronic GI blood loss and possibly related to his lymphoma.  He was recently hospitalized after falls and was treated for pneumonia.  He spent some time at a transitional care unit.  His discharge hemoglobin was 10.1.  This  "is down from his previous baseline.  Since transition of care discharge, he has had another fall.  He injured the back of his head.  He did not lose consciousness.  He wonders if he needs a picture taken of his head?  He has felt generally weak since the fall.  He is very committed to a knee replacement surgery.  He feels that it is crucial to his quality of life.  He realizes that given his health state, it will be a very risky proposition.  However, he is willing to take this risk given the outlook for his quality of life if he does not get his knee replaced.  His wife is with him today.  She shares this sentiment.    Review of Systems         Objective    /55 (BP Location: Right arm, Patient Position: Sitting, Cuff Size: Adult Regular)   Pulse 53   Temp 97.7  F (36.5  C) (Temporal)   Resp 16   Ht 1.778 m (5' 10\")   Wt 71.7 kg (158 lb)   SpO2 97%   BMI 22.67 kg/m    Body mass index is 22.67 kg/m .  Physical Exam   General: This is a well-appearing elderly man in no acute distress.  Cardiovascular: The heart has a regular rate and rhythm today.  Pulmonary: Lungs are clear to auscultation bilaterally, breathing does not appear to be labored, no wheezing today.  Extremities: Unchanged trace bilateral lower extremity edema, the right knee is in a brace.  Neurological: Very hard of hearing but alert and oriented to person place and time, cranial nerves II to XII appear grossly intact, no pronator drift, symmetric strength, he is now using a walker.  Mental State: Again he is very hard of hearing so it makes a mental status exam difficult, but he seems to understand the risk of orthopedic surgery and that there are alternatives.  He would like to proceed with plans for surgery.  I believe he has the capacity to understand the risks and benefits of this decision.                "

## 2022-06-01 NOTE — TELEPHONE ENCOUNTER
PCP pended admit/therapy orders- please review and sign and route back to triage to follow up with infusion center.     Walker Ennis RN  [facility]

## 2022-06-01 NOTE — LETTER
June 1, 2022      Hermilo Velasquez  [address] [facility] [address]        Dear ,    We are writing to inform you of your test results.    Good news! The head CT does NOT show any bleeding or serious injury from your fall.      Resulted Orders   CT Head w/o Contrast    Narrative    CT SCAN OF THE HEAD WITHOUT CONTRAST   6/1/2022 3:42 PM     HISTORY: Trauma - head injury. Injury of head, initial encounter.    TECHNIQUE:  Axial images of the head and coronal reformations without  IV contrast material. Radiation dose for this scan was reduced using  automated exposure control, adjustment of the mA and/or kV according  to patient size, or iterative reconstruction technique.    COMPARISON: 5/8/2022 and 3/30/2020.    FINDINGS: There is no evidence of intracranial hemorrhage, mass, acute  infarct or anomaly. The ventricles are normal in size, shape and  configuration. Mild to moderate diffuse parenchymal volume loss. Mild  patchy periventricular white matter hypodensities which are  nonspecific, but likely related to chronic microvascular ischemic  disease. Scattered intracranial atherosclerotic calcifications.    Bilateral lens implants. Mild to moderate scattered presumed paranasal  sinus mucosal thickening with partially visualized air-fluid level in  the right maxillary sinus, as before. Polypoid soft tissue  opacification of the bilateral olfactory clefts anteriorly, unchanged,  nonspecific. This could represent small polyps or possibly small  neoplasms such as respiratory epithelial adenomatoid hamartomas. This  appears grossly stable dating back to at least 3/30/2020. The bony  calvarium and bones of the skull base appear intact.       Impression    IMPRESSION:     1. No evidence of acute intracranial hemorrhage, mass, or herniation.  2. Generalized brain parenchymal volume loss and white matter changes  likely due to chronic microvascular ischemic disease.    FUENTES DE DIOS MD          SYSTEM ID:  [id number]       If you have any questions or concerns, please call the clinic at the number listed above.       Sincerely,      Karson Bishop MD

## 2022-06-02 NOTE — TELEPHONE ENCOUNTER
Patient scheduled for infusion  6/7/2022 8:00 AM [facility] CANCER INFUSION NURSE;  INFUSION CHAIR 12 [facility]     Walker Ennis RN  [facility]

## 2022-06-05 NOTE — TELEPHONE ENCOUNTER
Call from [facility] pharmacy [phone] asking about refill for lipitor 10mg daily. Reviewed with pharmacy that lipitor was stopped by PCP Dr. Bishop for a rash concern on 1/10/18. Pharmacy will not fill today and discuss with patient.  Will message Team 3 with update  
Speaking Coherently

## 2022-06-07 ENCOUNTER — INFUSION THERAPY VISIT (OUTPATIENT)
Dept: INFUSION THERAPY | Facility: CLINIC | Age: 87
End: 2022-06-07
Attending: INTERNAL MEDICINE
Payer: COMMERCIAL

## 2022-06-07 VITALS
WEIGHT: 159.8 LBS | OXYGEN SATURATION: 96 % | DIASTOLIC BLOOD PRESSURE: 62 MMHG | TEMPERATURE: 98.1 F | BODY MASS INDEX: 22.93 KG/M2 | SYSTOLIC BLOOD PRESSURE: 110 MMHG | HEART RATE: 97 BPM | RESPIRATION RATE: 16 BRPM

## 2022-06-07 DIAGNOSIS — D50.9 IRON DEFICIENCY ANEMIA, UNSPECIFIED IRON DEFICIENCY ANEMIA TYPE: ICD-10-CM

## 2022-06-07 DIAGNOSIS — D62 ANEMIA DUE TO BLOOD LOSS, ACUTE: ICD-10-CM

## 2022-06-07 DIAGNOSIS — D64.9 ANEMIA, UNSPECIFIED TYPE: ICD-10-CM

## 2022-06-07 DIAGNOSIS — D50.0 IRON DEFICIENCY ANEMIA DUE TO CHRONIC BLOOD LOSS: Primary | ICD-10-CM

## 2022-06-07 PROCEDURE — 250N000011 HC RX IP 250 OP 636: Performed by: INTERNAL MEDICINE

## 2022-06-07 PROCEDURE — 96374 THER/PROPH/DIAG INJ IV PUSH: CPT

## 2022-06-07 PROCEDURE — 258N000003 HC RX IP 258 OP 636: Performed by: INTERNAL MEDICINE

## 2022-06-07 RX ORDER — ALBUTEROL SULFATE 90 UG/1
1-2 AEROSOL, METERED RESPIRATORY (INHALATION)
Status: CANCELLED
Start: 2022-06-14

## 2022-06-07 RX ORDER — METHYLPREDNISOLONE SODIUM SUCCINATE 125 MG/2ML
125 INJECTION, POWDER, LYOPHILIZED, FOR SOLUTION INTRAMUSCULAR; INTRAVENOUS
Status: CANCELLED
Start: 2022-06-14

## 2022-06-07 RX ORDER — EPINEPHRINE 1 MG/ML
0.3 INJECTION, SOLUTION INTRAMUSCULAR; SUBCUTANEOUS EVERY 5 MIN PRN
Status: CANCELLED | OUTPATIENT
Start: 2022-06-14

## 2022-06-07 RX ORDER — NALOXONE HYDROCHLORIDE 0.4 MG/ML
0.2 INJECTION, SOLUTION INTRAMUSCULAR; INTRAVENOUS; SUBCUTANEOUS
Status: CANCELLED | OUTPATIENT
Start: 2022-06-14

## 2022-06-07 RX ORDER — MEPERIDINE HYDROCHLORIDE 25 MG/ML
25 INJECTION INTRAMUSCULAR; INTRAVENOUS; SUBCUTANEOUS EVERY 30 MIN PRN
Status: CANCELLED | OUTPATIENT
Start: 2022-06-14

## 2022-06-07 RX ORDER — DIPHENHYDRAMINE HYDROCHLORIDE 50 MG/ML
50 INJECTION INTRAMUSCULAR; INTRAVENOUS
Status: CANCELLED
Start: 2022-06-14

## 2022-06-07 RX ORDER — HEPARIN SODIUM (PORCINE) LOCK FLUSH IV SOLN 100 UNIT/ML 100 UNIT/ML
5 SOLUTION INTRAVENOUS
Status: CANCELLED | OUTPATIENT
Start: 2022-06-14

## 2022-06-07 RX ORDER — ALBUTEROL SULFATE 0.83 MG/ML
2.5 SOLUTION RESPIRATORY (INHALATION)
Status: CANCELLED | OUTPATIENT
Start: 2022-06-14

## 2022-06-07 RX ORDER — HEPARIN SODIUM,PORCINE 10 UNIT/ML
5 VIAL (ML) INTRAVENOUS
Status: CANCELLED | OUTPATIENT
Start: 2022-06-14

## 2022-06-07 RX ADMIN — SODIUM CHLORIDE 250 ML: 9 INJECTION, SOLUTION INTRAVENOUS at 09:05

## 2022-06-07 RX ADMIN — FERRIC CARBOXYMALTOSE INJECTION 750 MG: 50 INJECTION, SOLUTION INTRAVENOUS at 09:05

## 2022-06-07 ASSESSMENT — PAIN SCALES - GENERAL: PAINLEVEL: SEVERE PAIN (6)

## 2022-06-07 NOTE — PROGRESS NOTES
Infusion Nursing Note:  Hermilo Velasquez presents today for Injectafer dose 1 of 2.    Patient seen by provider today: No   present during visit today: Not Applicable.    Note: Has received Injectafer in past.  In 2020 and 2016.  Endorses fatigue and Right knee pain.  He tells me the plan is to have his knee replaced.  Plan per Dr. Bishop's note is to receive Injectafer to secure a good Hgb pre-operatively.       Intravenous Access:  Peripheral IV placed.    Treatment Conditions:  Iron 50 - 150 mcg/dL 15 Low     Resulting Agency  [facility]   Specimen Collected: 03/07/22  6:06 PM Last Resulted: 03/07/22  6:54 PM   [facility]  Result Received: 03/08/22  4:30 PM     Component Ref Range & Units 2 wk ago      Hemoglobin (External) 13.5 - 17.5 g/dL 10.1 Abnormal     [facility]         Post Infusion Assessment:  Patient tolerated infusion without incident.  Patient observed for 30 minutes post Injectafer per protocol.  Blood return noted pre and post infusion.  Site patent and intact, free from redness, edema or discomfort.  No evidence of extravasations.  Access discontinued per protocol.       Discharge Plan:   Copy of AVS reviewed with patient and/or family.  Patient will return 6/14/22 for next appointment.  Patient discharged in stable condition accompanied by: self.  Departure Mode: Wheelchair.  Face to Face time: 0.      Yecenia Mcnally RN

## 2022-06-08 ENCOUNTER — OFFICE VISIT (OUTPATIENT)
Dept: FAMILY MEDICINE | Facility: CLINIC | Age: 87
End: 2022-06-08
Payer: COMMERCIAL

## 2022-06-08 VITALS
OXYGEN SATURATION: 99 % | TEMPERATURE: 97.4 F | HEIGHT: 70 IN | WEIGHT: 160 LBS | RESPIRATION RATE: 16 BRPM | HEART RATE: 66 BPM | BODY MASS INDEX: 22.9 KG/M2 | DIASTOLIC BLOOD PRESSURE: 67 MMHG | SYSTOLIC BLOOD PRESSURE: 152 MMHG

## 2022-06-08 DIAGNOSIS — Z01.818 PREOP GENERAL PHYSICAL EXAM: Primary | ICD-10-CM

## 2022-06-08 DIAGNOSIS — D62 ANEMIA DUE TO BLOOD LOSS, ACUTE: ICD-10-CM

## 2022-06-08 DIAGNOSIS — M25.561 RIGHT KNEE PAIN, UNSPECIFIED CHRONICITY: ICD-10-CM

## 2022-06-08 LAB
ERYTHROCYTE [DISTWIDTH] IN BLOOD BY AUTOMATED COUNT: 16 % (ref 10–15)
HCT VFR BLD AUTO: 33.6 % (ref 40–53)
HGB BLD-MCNC: 10.3 G/DL (ref 13.3–17.7)
MCH RBC QN AUTO: 26.8 PG (ref 26.5–33)
MCHC RBC AUTO-ENTMCNC: 30.7 G/DL (ref 31.5–36.5)
MCV RBC AUTO: 87 FL (ref 78–100)
PLATELET # BLD AUTO: 226 10E3/UL (ref 150–450)
RBC # BLD AUTO: 3.85 10E6/UL (ref 4.4–5.9)
WBC # BLD AUTO: 8 10E3/UL (ref 4–11)

## 2022-06-08 PROCEDURE — 99214 OFFICE O/P EST MOD 30 MIN: CPT | Performed by: NURSE PRACTITIONER

## 2022-06-08 PROCEDURE — 36415 COLL VENOUS BLD VENIPUNCTURE: CPT | Performed by: NURSE PRACTITIONER

## 2022-06-08 PROCEDURE — 85027 COMPLETE CBC AUTOMATED: CPT | Performed by: NURSE PRACTITIONER

## 2022-06-08 NOTE — PROGRESS NOTES
[address], [address]  [facility] [address]  Phone: [phone]  Primary Provider: Karson Bishop  Pre-op Performing Provider: WILBUR SCHMIDT      PREOPERATIVE EVALUATION:  Today's date: 6/8/2022    Hermilo Velasquez is a 89 year old male who presents for a preoperative evaluation.    Surgical Information:  Surgery/Procedure: Right total knee arthroplasty  Surgery Location:  OR  Surgeon: Dr. Deshpande  Surgery Date: 06/20/22  Time of Surgery: 1015  Where patient plans to recover: At home with family  Fax number for surgical facility: Note does not need to be faxed, will be available electronically in Epic.    Type of Anesthesia Anticipated: Choice    Assessment & Plan     The proposed surgical procedure is considered INTERMEDIATE risk.    Problem List Items Addressed This Visit     Anemia due to blood loss, acute      Other Visit Diagnoses     Preop general physical exam    -  Primary    Relevant Orders    CBC with platelets (Completed)    Right knee pain, unspecified chronicity                   Risks and Recommendations:  The patient has the following additional risks and recommendations for perioperative complications:  Anemia/Bleeding/Clotting:    - Anemia and requires treatment prior to surgery.  Has 1 more iron infusion planned for next week.  COPD     Medication Instructions:  Patient is to take all scheduled medications on the day of surgery EXCEPT for modifications listed below:  Hold Eliquis 2 days before surgery  No vitamins morning of surgery    RECOMMENDATION:  APPROVAL GIVEN to proceed with proposed procedure, without further diagnostic evaluation.        Subjective     HPI related to upcoming procedure: Going for right TKA  Patient had a recent fall resulting in hemarthrosis in the setting of anticoagulation.  This resulted in anemia.  He did an iron infusion just yesterday and has 1 more plan for next week  Has been feeling well. Just a little tired    Is on nighttime oxygen likely for COPD  History slightly limited as patient is not quite active in his medical care    Preop Questions 6/8/2022   1. Have you ever had a heart attack or stroke? No   2. Have you ever had surgery on your heart or blood vessels, such as a stent placement, a coronary artery bypass, or surgery on an artery in your head, neck, heart, or legs? No   3. Do you have chest pain with activity? No   4. Do you have a history of  heart failure? No   5. Do you currently have a cold, bronchitis or symptoms of other infection? No   6. Do you have a cough, shortness of breath, or wheezing? No   7. Do you or anyone in your family have previous history of blood clots? YES - DVT    8. Do you or does anyone in your family have a serious bleeding problem such as prolonged bleeding following surgeries or cuts? No   9. Have you ever had problems with anemia or been told to take iron pills? No   10. Have you had any abnormal blood loss such as black, tarry or bloody stools? No   11. Have you ever had a blood transfusion? YES -    11a. Have you ever had a transfusion reaction? No   12. Are you willing to have a blood transfusion if it is medically needed before, during, or after your surgery? Yes   13. Have you or any of your relatives ever had problems with anesthesia? No   14. Do you have sleep apnea, excessive snoring or daytime drowsiness? No   15. Do you have any artifical heart valves or other implanted medical devices like a pacemaker, defibrillator, or continuous glucose monitor? No   16. Do you have artificial joints? YES -    17. Are you allergic to latex? No       Health Care Directive:  Patient has a Health Care Directive on file      Preoperative Review of :   reviewed - historical      Status of Chronic Conditions:  See problem list for active medical problems.  Problems all longstanding and stable, except as noted/documented.  See ROS for pertinent symptoms related to these  conditions.      Review of Systems  Constitutional, neuro, ENT, endocrine, pulmonary, cardiac, gastrointestinal, genitourinary, musculoskeletal, integument and psychiatric systems are negative, except as otherwise noted.    Patient Active Problem List    Diagnosis Date Noted     Hemarthrosis 05/08/2022     Priority: Medium     Acute on chronic diastolic congestive heart failure (H) 05/27/2021     Priority: Medium     History of pulmonary embolism 05/27/2021     Priority: Medium     Renal insufficiency 04/13/2021     Priority: Medium     Elevated troponin 04/13/2021     Priority: Medium     Pneumonia of left lower lobe due to infectious organism 04/13/2021     Priority: Medium     Gastrointestinal hemorrhage, unspecified gastrointestinal hemorrhage type 10/23/2020     Priority: Medium     Anemia, unspecified type 10/23/2020     Priority: Medium     CKD (chronic kidney disease) stage 3, GFR 30-59 ml/min 09/28/2020     Priority: Medium     FRED (acute kidney injury) (H) 09/14/2020     Priority: Medium     Gastric AVM's -- on EGD 8/17/20 09/14/2020     Priority: Medium     Anemia, iron deficiency 09/11/2020     Priority: Medium     History of non-Hodgkin's lymphoma 08/15/2020     Priority: Medium     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 08/15/2020     Priority: Medium     Feb 21, 2020 Entered By: FAHAD SALCIDO Comment: s/p RIGHT lobectomy in 3/2019       History of aortic valve replacement with tissue graft 08/15/2020     Priority: Medium     Feb 21, 2020 Entered By: FAHAD SALCIDO Comment: tissue valve in 2015       Centrilobular emphysema (H) 07/07/2020     Priority: Medium     On home oxygen 2L/min by NC       Malignant neoplasm of lung, unspecified laterality, unspecified part of lung (H) 01/13/2020     Priority: Medium     Nodule of lower lobe of right lung 03/26/2019     Priority: Medium     Spongiotic dermatitis 03/01/2019     Priority: Medium     Iron deficiency anemia due to chronic blood loss  08/30/2018     Priority: Medium     Chronic obstructive pulmonary disease, unspecified COPD type (H) 03/26/2018     Priority: Medium     Hyperlipidemia LDL goal <130 01/10/2018     Priority: Medium     Bullous pemphigoid 05/22/2017     Priority: Medium     Essential hypertension with goal blood pressure less than 140/90 08/19/2016     Priority: Medium     Non-Hodgkin's lymphoma, unspecified body region, unspecified non-Hodgkin lymphoma type (H) 08/19/2016     Priority: Medium     Pulmonary nodules 06/10/2016     Priority: Medium     Need for SBE (subacute bacterial endocarditis) prophylaxis      Priority: Medium     Paroxysmal atrial fib -- on Eliquis  09/14/2015     Priority: Medium     Post-op 2015       Anemia due to blood loss, acute 09/11/2015     Priority: Medium     Heart murmur, systolic 01/23/2015     Priority: Medium     CARDIOVASCULAR SCREENING; LDL GOAL LESS THAN 130 05/17/2014     Priority: Medium     Health Care Home 08/21/2013     Priority: Medium     Tiana Beatty BS, RN, PHN  A Care Coordinator  [phone]                        Microscopic hematuria 08/19/2013     Priority: Medium     [facility] 08/2013       Non Hodgkin's lymphoma (H): 2013 06/10/2013     Priority: Medium     Dr. 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 Vasques      Aortic stenosis     Severe AS, 9/2015 AVR - [facility] TRIFECTA Bovine bioprosthesis 25MM TF-25A     Atrial fibrillation (H)     9/2015 Paroxysmal post op Afib - discharged on Warfarin and a beta blocker     COPD (chronic obstructive pulmonary disease) (H)      Deep vein thrombosis (H)      GERD (gastroesophageal reflux disease)      Heart murmur       Monoclonal gammopathy     plasmacyte prominent causing monoclonal gammopathy     Need for SBE (subacute bacterial endocarditis) prophylaxis      Neuropathy      Other and unspecified hyperlipidemia      Other malignant lymphomas     non hodgkin's lymphoma     RBBB (right bundle branch block)      Severe sepsis with acute organ dysfunction (H) 11/16/2015     Unspecified hereditary and idiopathic peripheral neuropathy      Past Surgical History:   Procedure Laterality Date     APPENDECTOMY       AS TOTAL KNEE ARTHROPLASTY       BACK SURGERY       ESOPHAGOSCOPY, GASTROSCOPY, DUODENOSCOPY (EGD), COMBINED N/A 11/28/2015    Procedure: COMBINED ESOPHAGOSCOPY, GASTROSCOPY, DUODENOSCOPY (EGD);  Surgeon: Danis Castillo MD;  Location:  GI     ESOPHAGOSCOPY, GASTROSCOPY, DUODENOSCOPY (EGD), COMBINED N/A 7/26/2018    Procedure: COMBINED ESOPHAGOSCOPY, GASTROSCOPY, DUODENOSCOPY (EGD);;  Surgeon: Toby Dong DO;  Location:  GI     ESOPHAGOSCOPY, GASTROSCOPY, DUODENOSCOPY (EGD), COMBINED N/A 8/17/2020    Procedure: ESOPHAGOGASTRODUODENOSCOPY (EGD);  Surgeon: Herrera Henry MD;  Location:  GI     ESOPHAGOSCOPY, GASTROSCOPY, DUODENOSCOPY (EGD), COMBINED N/A 10/24/2020    Procedure: ESOPHAGOGASTRODUODENOSCOPY (EGD);  Surgeon: Vick Florentino MD;  Location:  GI     ESOPHAGOSCOPY, GASTROSCOPY, DUODENOSCOPY (EGD), COMBINED N/A 4/11/2022    Procedure: ESOPHAGOGASTRODUODENOSCOPY (EGD);  Surgeon: Vick Florentino MD;  Location:  GI     HERNIA REPAIR  2006     HERNIORRHAPHY VENTRAL  4/17/2013    Procedure: HERNIORRHAPHY VENTRAL;  VENTRAL HERNIA REPAIR WITH MESH;  Surgeon: Patel Guzman MD;  Location:  OR     KNEE SURGERY      arthroscopic right knee surgery      LOBECTOMY LUNG Right 3/26/2019    POSSIBLE LOBECTOMY LUNG per Dr. [facility]ques [facility]     REPAIR LIGAMENT ANKLE  2/23/2012    Procedure:REPAIR LIGAMENT ANKLE; LEFT TARSAL TUNNEL RELEASE OF KNOT OF ARIEL RELEASE; Surgeon:KWAME  SAUL GARZA; Location:[facility] OR     REPLACE VALVE AORTIC N/A 9/3/2015    Procedure: REPLACE VALVE AORTIC;  Surgeon: Antonino Mitchell MD;  Location: [facility] OR     ROTATOR CUFF REPAIR RT/LT      bilateral     SPINE SURGERY      3 spine surgeries     THORACOTOMY, WEDGE RESECTION LUNG, COMBINED Right 3/26/2019    RIGHT THORACOTOMY, WEDGE RESECTION, RIGHT LOWER LOBE LUNG NODULE,;  Surgeon: Jose A Vasques MD;  Location: [facility] OR     TONSILLECTOMY       TURP       Current Outpatient Medications   Medication Sig Dispense Refill     ACE/ARB/ARNI NOT PRESCRIBED (INTENTIONAL) Please choose reason not prescribed from choices below.       acetaminophen (TYLENOL) 500 MG tablet Take 2 tablets (1,000 mg) by mouth 3 times daily       albuterol (PROAIR HFA/PROVENTIL HFA/VENTOLIN HFA) 108 (90 Base) MCG/ACT inhaler Inhale 1-2 puffs into the lungs every 6 hours as needed for shortness of breath / dyspnea or wheezing 3 Inhaler 5     albuterol (PROVENTIL) (2.5 MG/3ML) 0.083% neb solution Take 1 vial (2.5 mg) by nebulization every 6 hours as needed for shortness of breath / dyspnea or wheezing 180 mL 11     apixaban ANTICOAGULANT (ELIQUIS) 5 MG tablet Take 5 mg by mouth in the morning and 5 mg in the evening.       atorvastatin (LIPITOR) 10 MG tablet Take 1 tablet (10 mg) by mouth daily 90 tablet 2     DULoxetine (CYMBALTA) 60 MG capsule Take 60 mg by mouth daily       famotidine (PEPCID) 40 MG tablet Take 40 mg by mouth 2 times daily       ferrous sulfate (FE TABS) 325 (65 Fe) MG EC tablet Take 1 tablet (325 mg) by mouth daily 90 tablet 3     fluticasone-vilanterol (BREO ELLIPTA) 200-25 MCG/INH inhaler Inhale 1 puff into the lungs daily       gabapentin (NEURONTIN) 300 MG capsule Take 600 mg by mouth At Bedtime       hydrOXYzine (VISTARIL) 50 MG capsule Take 50 mg by mouth every 6 hours as needed for itching       metoprolol succinate ER (TOPROL-XL) 25 MG 24 hr tablet Take 0.5 tablets (12.5 mg) by mouth daily 45 tablet 0      "triamcinolone (KENALOG) 0.1 % external cream Apply topically 2 times daily as needed for irritation 453.6 g 3       Allergies   Allergen Reactions     Lidocaine Blisters     Allergy to lidocaine ointment     Omeprazole Itching     Pantoprazole Itching     Prevacid [Lansoprazole] Itching     Lasix [Furosemide] Rash     Penicillins Rash     \"broke out from injection\" 60 yrs ago  Tolerates cephalosporins        Social History     Tobacco Use     Smoking status: Former Smoker     Packs/day: 2.00     Years: 55.00     Pack years: 110.00     Quit date: 1998     Years since quittin.3     Smokeless tobacco: Never Used   Substance Use Topics     Alcohol use: Yes     Alcohol/week: 0.0 standard drinks     Comment: 1 drink per day     Family History   Problem Relation Age of Onset     C.A.D. Father      Emphysema Father      Melanoma No family hx of      Skin Cancer No family hx of      History   Drug Use No         Objective     BP (!) 152/67 (BP Location: Right arm, Patient Position: Sitting, Cuff Size: Adult Regular)   Pulse 66   Temp 97.4  F (36.3  C)   Resp 16   Ht 1.778 m (5' 10\")   Wt 72.6 kg (160 lb)   SpO2 99%   BMI 22.96 kg/m      Physical Exam    GENERAL APPEARANCE: healthy, alert and no distress     EYES: EOMI     RESP: lungs very diminished throughout - no rales, rhonchi or wheezes     CV: regular rates Irregularly irregular rhythm, normal S1 S2, no S3 or S4 and no murmur, click or rub     MS: extremities normal- no gross deformities noted, no evidence of inflammation in joints, FROM in all extremities.     SKIN: no suspicious lesions or rashes     NEURO: Normal strength and tone, sensory exam grossly normal, mentation intact and speech normal     PSYCH: mentation appears normal. and affect normal/bright     LYMPHATICS: No cervical adenopathy    Recent Labs   Lab Test 05/10/22  0717 22  0917 22  2336 22  1223 10/24/20  0037 10/23/20  1617   HGB 9.2* 9.6*   < > 11.2*   < > 6.6* "    179  --  244   < > 279   INR  --   --   --  1.99*  --  1.39*   NA  --  135  --  137   < > 142   POTASSIUM  --  3.6  --  4.3   < > 3.9   CR  --  0.92  --  1.13   < > 1.25    < > = values in this interval not displayed.        Diagnostics:  Recent Results (from the past 24 hour(s))   CBC with platelets    Collection Time: 06/08/22  3:11 PM   Result Value Ref Range    WBC Count 8.0 4.0 - 11.0 10e3/uL    RBC Count 3.85 (L) 4.40 - 5.90 10e6/uL    Hemoglobin 10.3 (L) 13.3 - 17.7 g/dL    Hematocrit 33.6 (L) 40.0 - 53.0 %    MCV 87 78 - 100 fL    MCH 26.8 26.5 - 33.0 pg    MCHC 30.7 (L) 31.5 - 36.5 g/dL    RDW 16.0 (H) 10.0 - 15.0 %    Platelet Count 226 150 - 450 10e3/uL      No EKG this visit, completed in the last 90 days.    Revised Cardiac Risk Index (RCRI):  The patient has the following serious cardiovascular risks for perioperative complications:   - No serious cardiac risks = 0 points     RCRI Interpretation: 0 points: Class I (very low risk - 0.4% complication rate)           Signed Electronically by: ILYA Carmona CNP  Copy of this evaluation report is provided to requesting physician.

## 2022-06-08 NOTE — PATIENT INSTRUCTIONS
Preparing for Your Surgery  Getting started  A nurse will call you to review your health history and instructions. They will give you an arrival time based on your scheduled surgery time. Please be ready to share:  Your doctor's clinic name and phone number  Your medical, surgical and anesthesia history  A list of allergies and sensitivities  A list of medicines, including herbal treatments and over-the-counter drugs  Whether the patient has a legal guardian (ask how to send us the papers in advance)  Please tell us if you're pregnant--or if there's any chance you might be pregnant. Some surgeries may injure a fetus (unborn baby), so they require a pregnancy test. Surgeries that are safe for a fetus don't always need a test, and you can choose whether to have one.   If you have a child who's having surgery, please ask for a copy of Preparing for Your Child's Surgery.    Preparing for surgery  Within 30 days of surgery: Have a pre-op exam (sometimes called an H&P, or History and Physical). This can be done at a clinic or pre-operative center.  If you're having a , you may not need this exam. Talk to your care team.  At your pre-op exam, talk to your care team about all medicines you take. If you need to stop any medicines before surgery, ask when to start taking them again.  We do this for your safety. Many medicines can make you bleed too much during surgery. Some change how well surgery (anesthesia) drugs work.  Call your insurance company to let them know you're having surgery. (If you don't have insurance, call [phone].)  Call your clinic if there's any change in your health. This includes signs of a cold or flu (sore throat, runny nose, cough, rash, fever). It also includes a scrape or scratch near the surgery site.  If you have questions on the day of surgery, call your hospital or surgery center.  COVID testing  You may need to be tested for COVID-19 before having surgery. If so, we will give  you instructions.  Eating and drinking guidelines  For your safety: Unless your surgeon tells you otherwise, follow the guidelines below.  Eat and drink as usual until 8 hours before surgery. After that, no food or milk.  Drink clear liquids until 2 hours before surgery. These are liquids you can see through, like water, Gatorade and Propel Water. You may also have black coffee and tea (no cream or milk).  Nothing by mouth within 2 hours of surgery. This includes gum, candy and breath mints.  If you drink alcohol: Stop drinking it the night before surgery.  If your care team tells you to take medicine on the morning of surgery, it's okay to take it with a sip of water.  Preventing infection  Shower or bathe the night before and morning of your surgery. Follow the instructions your clinic gave you. (If no instructions, use regular soap.)  Don't shave or clip hair near your surgery site. We'll remove the hair if needed.  Don't smoke or vape the morning of surgery. You may chew nicotine gum up to 2 hours before surgery. A nicotine patch is okay.  Note: Some surgeries require you to completely quit smoking and nicotine. Check with your surgeon.  Your care team will make every effort to keep you safe from infection. We will:  Clean our hands often with soap and water (or an alcohol-based hand rub).  Clean the skin at your surgery site with a special soap that kills germs.  Give you a special gown to keep you warm. (Cold raises the risk of infection.)  Wear special hair covers, masks, gowns and gloves during surgery.  Give antibiotic medicine, if prescribed. Not all surgeries need antibiotics.  What to bring on the day of surgery  Photo ID and insurance card  Copy of your health care directive, if you have one  Glasses and hearing aides (bring cases)  You can't wear contacts during surgery  Inhaler and eye drops, if you use them (tell us about these when you arrive)  CPAP machine or breathing device, if you use them  A  few personal items, if spending the night  If you have . . .  A pacemaker, ICD (cardiac defibrillator) or other implant: Bring the ID card.  An implanted stimulator: Bring the remote control.  A legal guardian: Bring a copy of the certified (court-stamped) guardianship papers.  Please remove any jewelry, including body piercings. Leave jewelry and other valuables at home.  If you're going home the day of surgery  You must have a responsible adult drive you home. They should stay with you overnight as well.  If you don't have someone to stay with you, and you aren't safe to go home alone, we may keep you overnight. Insurance often won't pay for this.  After surgery  If it's hard to control your pain or you need more pain medicine, please call your surgeon's office.  Questions?   If you have any questions for your care team, list them here: _________________________________________________________________________________________________________________________________________________________________________ ____________________________________ ____________________________________ ____________________________________  For informational purposes only. Not to replace the advice of your health care provider. Copyright   2003, 2019 [employer]. All rights reserved. Clinically reviewed by Aurora Chaudhary MD. [employer] [id number] - REV 07/21.    Before Your Procedure or Hospital Admission  Testing for COVID-19  Thank you for choosing [facility] for your health care needs.The COVID-19 pandemic is a very challenging time for everyone.   Our goal is to keep you and our team here at [facility] safe and healthy. We've taken many steps to make this happen. For example:  We test and screen our staff, care teams and patients for COVID-19.  Everyone at [facility] must wear a mask and stay 6 feet apart.  We are limiting hospital and clinic visitors.  Before you come in  You must get tested for COVID-19, even  "if you've been vaccinated.  If you're going home on the same day as your procedure (surgery):  1 to 2 days before your procedure, take an at-home, rapid antigen test. You can buy these at many pharmacy stores. Or you can order free, at-home tests at [employer]/tests. If you can't find an at-home, rapid antigen test, please schedule a PCR test with [employer] by calling [employer], or visiting [email or web address]. We can't accept tests that are more than 4 days old.  If your test is negative, please take a photo of the test. Show the photo to the nurse when you come in for your procedure.  If your test is positive, please see the \"If your test shows you have COVID-19\" section on this page.  If you're staying overnight in the hospital:  4 days before your procedure, please get a   PCR test from a lab.  To schedule a PCR test with [employer], call [phone]. Or, visit   [email or web address].  If your test is negative, please ask the testing location to fax your results to us at [phone].  If your test is positive, please see the \"If your test shows you have COVID-19\" section below.  After your test and before your procedure, please follow these safetyguidelines:  Limit trips outside your home.  Limit the number of people you see.  Always wear a face covering outside your home.  Use social distancing. Stay 6 feet away from others whenever you can.  Wash your hands often.  If your test shows you have COVID-19  If your test is positive, please tell your surgeon's office right away. A positive test means that you have COVID-19.  We'll probably have to postpone your admission, surgery or procedure. Your care team will discuss this with you. After that, we'll let you know what to do and when you can re-schedule.  If your test shows you DON'T have COVID-19  Even if your test is negative, you can still get COVID-19. It's rare, but sometimes the test result is wrong. " You could also catch the virus after taking the test.   There's a very small chance that you could catch COVID-19 in the hospital or surgery center. [facility] has taken many steps to prevent this from happening.   Possible surgery delay   Like you, we want your surgery to happen when it's scheduled. But sometimes the hospital is so full that it's not safe for you to have your surgery. This is especially true during the pandemic. Your surgery may need to be re-scheduled at a later date. If thishappens, we will call and tell you.   Day of your surgery or procedure  Please come wearing a face covering that covers both your nose and mouth.  When you arrive, we'll ask you some questions to find out if you've had any exposures to COVID-19, or have any signs of COVID-19.  No matter where you took a test, we'll need to have the results. Please ask your testing location to fax the results to us at [phone]. If you took a rapid antigen at-home test, you can bring a photo of the results with you to your procedure.  Ask your care team if you can have visitors. All visitors must wear face coverings and will be screened for exposure to, or signs of, COVID-19.  The rules for visitors change often, depending on how much the virus is spreading. To learn more, see Visiting a Loved One in the Hospital during the COVID-19 Outbreak.  Please call your care team, hospital or surgery center if you have any questions. We thank you for your understanding and for choosing [facility] for your care.   Questions and answers  Does it matter how I get tested for COVID-19?  Yes. If the plan is for you to go home on the same day as your procedure, you can take a rapid antigen, at-home test 1 to 2 days before you come in. If your test is negative, please take a photo of your test. Show it to the nurse when you come to the hospital. If you can't find a rapid antigen, at-home test, you can take a PCR test at a lab instead.  If the  plan is for you to stay in the hospital after your surgery, you'll need to get a PCR test from a lab 4 days before your procedure. Ask the testing location to fax your results to [phone].  If we don't get your results, we may have to delay or cancel your treatment.  Do I need to get tested at [facility]?  No. However, if you need a PCR test from a lab, we recommend using an [facility] lab. We'll get the results more quickly and easily that way. To schedule a test, please call [phone]. Or, visit [email or web address].  For informational purposes only. Not to replace the advice of your health care provider. Copyright   2020 [facility]. All rights reserved. Clinically reviewed by [facility] and the [facility] COVID-19 Clinical Team. [employer] [id number] - Rev 05/26/22.    HOLD Eliquis for 2 days before surgery   No vitamins morning of surgery

## 2022-06-09 NOTE — RESULT ENCOUNTER NOTE
Your hemoglobin has improved from last time.  Hopefully it will continue to go up with the iron infusions  Nelson

## 2022-06-14 ENCOUNTER — INFUSION THERAPY VISIT (OUTPATIENT)
Dept: INFUSION THERAPY | Facility: CLINIC | Age: 87
End: 2022-06-14
Attending: INTERNAL MEDICINE
Payer: COMMERCIAL

## 2022-06-14 VITALS
TEMPERATURE: 97.7 F | DIASTOLIC BLOOD PRESSURE: 54 MMHG | RESPIRATION RATE: 16 BRPM | OXYGEN SATURATION: 94 % | SYSTOLIC BLOOD PRESSURE: 95 MMHG | HEART RATE: 76 BPM

## 2022-06-14 DIAGNOSIS — D64.9 ANEMIA, UNSPECIFIED TYPE: ICD-10-CM

## 2022-06-14 DIAGNOSIS — D50.0 IRON DEFICIENCY ANEMIA DUE TO CHRONIC BLOOD LOSS: Primary | ICD-10-CM

## 2022-06-14 DIAGNOSIS — D62 ANEMIA DUE TO BLOOD LOSS, ACUTE: ICD-10-CM

## 2022-06-14 DIAGNOSIS — D50.9 IRON DEFICIENCY ANEMIA, UNSPECIFIED IRON DEFICIENCY ANEMIA TYPE: ICD-10-CM

## 2022-06-14 PROCEDURE — 258N000003 HC RX IP 258 OP 636: Performed by: INTERNAL MEDICINE

## 2022-06-14 PROCEDURE — 96365 THER/PROPH/DIAG IV INF INIT: CPT

## 2022-06-14 PROCEDURE — 250N000011 HC RX IP 250 OP 636: Performed by: INTERNAL MEDICINE

## 2022-06-14 RX ORDER — DIPHENHYDRAMINE HYDROCHLORIDE 50 MG/ML
50 INJECTION INTRAMUSCULAR; INTRAVENOUS
Status: CANCELLED
Start: 2022-06-14

## 2022-06-14 RX ORDER — METHYLPREDNISOLONE SODIUM SUCCINATE 125 MG/2ML
125 INJECTION, POWDER, LYOPHILIZED, FOR SOLUTION INTRAMUSCULAR; INTRAVENOUS
Status: CANCELLED
Start: 2022-06-14

## 2022-06-14 RX ORDER — HEPARIN SODIUM (PORCINE) LOCK FLUSH IV SOLN 100 UNIT/ML 100 UNIT/ML
5 SOLUTION INTRAVENOUS
Status: CANCELLED | OUTPATIENT
Start: 2022-06-14

## 2022-06-14 RX ORDER — ALBUTEROL SULFATE 90 UG/1
1-2 AEROSOL, METERED RESPIRATORY (INHALATION)
Status: CANCELLED
Start: 2022-06-14

## 2022-06-14 RX ORDER — MEPERIDINE HYDROCHLORIDE 25 MG/ML
25 INJECTION INTRAMUSCULAR; INTRAVENOUS; SUBCUTANEOUS EVERY 30 MIN PRN
Status: CANCELLED | OUTPATIENT
Start: 2022-06-14

## 2022-06-14 RX ORDER — NALOXONE HYDROCHLORIDE 0.4 MG/ML
0.2 INJECTION, SOLUTION INTRAMUSCULAR; INTRAVENOUS; SUBCUTANEOUS
Status: CANCELLED | OUTPATIENT
Start: 2022-06-14

## 2022-06-14 RX ORDER — EPINEPHRINE 1 MG/ML
0.3 INJECTION, SOLUTION INTRAMUSCULAR; SUBCUTANEOUS EVERY 5 MIN PRN
Status: CANCELLED | OUTPATIENT
Start: 2022-06-14

## 2022-06-14 RX ORDER — HEPARIN SODIUM,PORCINE 10 UNIT/ML
5 VIAL (ML) INTRAVENOUS
Status: CANCELLED | OUTPATIENT
Start: 2022-06-14

## 2022-06-14 RX ORDER — ALBUTEROL SULFATE 0.83 MG/ML
2.5 SOLUTION RESPIRATORY (INHALATION)
Status: CANCELLED | OUTPATIENT
Start: 2022-06-14

## 2022-06-14 RX ADMIN — SODIUM CHLORIDE 250 ML: 9 INJECTION, SOLUTION INTRAVENOUS at 12:38

## 2022-06-14 RX ADMIN — FERRIC CARBOXYMALTOSE INJECTION 750 MG: 50 INJECTION, SOLUTION INTRAVENOUS at 12:38

## 2022-06-14 ASSESSMENT — PAIN SCALES - GENERAL: PAINLEVEL: NO PAIN (0)

## 2022-06-14 NOTE — PROGRESS NOTES
Infusion Nursing Note:  Hermilo Velasquez presents today for injectafer 2/2.    Patient seen by provider today: No   present during visit today: Not Applicable.    Note: Pt reports he tolerated his first dose of injectafer without issue.  Pt denies changes in health since last week.    Intravenous Access:  Peripheral IV placed.    Treatment Conditions:  Not Applicable.    Post Infusion Assessment:  Patient tolerated infusion without incident.  Patient observed for 30 minutes post injectafer per protocol.  Blood return noted pre and post infusion.  Site patent and intact, free from redness, edema or discomfort.  No evidence of extravasations.  Access discontinued per protocol.     Discharge Plan:   Patient declined prescription refills.  Discharge instructions reviewed with: Patient.  Patient and/or family verbalized understanding of discharge instructions and all questions answered.  AVS to patient via [employer].  Patient will return PRN for next appointment.   Patient discharged in stable condition accompanied by: self and wife.  Departure Mode: Ambulatory with walker.      Daja West RN

## 2022-06-15 ENCOUNTER — TRANSFERRED RECORDS (OUTPATIENT)
Dept: FAMILY MEDICINE | Facility: CLINIC | Age: 87
End: 2022-06-15

## 2022-06-27 ENCOUNTER — OFFICE VISIT (OUTPATIENT)
Dept: FAMILY MEDICINE | Facility: CLINIC | Age: 87
End: 2022-06-27
Payer: COMMERCIAL

## 2022-06-27 VITALS
BODY MASS INDEX: 22.33 KG/M2 | TEMPERATURE: 96.9 F | RESPIRATION RATE: 18 BRPM | WEIGHT: 156 LBS | DIASTOLIC BLOOD PRESSURE: 73 MMHG | OXYGEN SATURATION: 94 % | HEART RATE: 77 BPM | SYSTOLIC BLOOD PRESSURE: 119 MMHG | HEIGHT: 70 IN

## 2022-06-27 DIAGNOSIS — Z01.818 PREOP GENERAL PHYSICAL EXAM: Primary | ICD-10-CM

## 2022-06-27 DIAGNOSIS — M25.561 RIGHT KNEE PAIN, UNSPECIFIED CHRONICITY: ICD-10-CM

## 2022-06-27 LAB
ERYTHROCYTE [DISTWIDTH] IN BLOOD BY AUTOMATED COUNT: 17.1 % (ref 10–15)
HCT VFR BLD AUTO: 39.4 % (ref 40–53)
HGB BLD-MCNC: 12.1 G/DL (ref 13.3–17.7)
MCH RBC QN AUTO: 27 PG (ref 26.5–33)
MCHC RBC AUTO-ENTMCNC: 30.7 G/DL (ref 31.5–36.5)
MCV RBC AUTO: 88 FL (ref 78–100)
PLATELET # BLD AUTO: 158 10E3/UL (ref 150–450)
RBC # BLD AUTO: 4.48 10E6/UL (ref 4.4–5.9)
WBC # BLD AUTO: 10.9 10E3/UL (ref 4–11)

## 2022-06-27 PROCEDURE — 85027 COMPLETE CBC AUTOMATED: CPT | Performed by: NURSE PRACTITIONER

## 2022-06-27 PROCEDURE — 99214 OFFICE O/P EST MOD 30 MIN: CPT | Performed by: NURSE PRACTITIONER

## 2022-06-27 PROCEDURE — 36415 COLL VENOUS BLD VENIPUNCTURE: CPT | Performed by: NURSE PRACTITIONER

## 2022-06-27 ASSESSMENT — PAIN SCALES - GENERAL: PAINLEVEL: NO PAIN (0)

## 2022-06-27 NOTE — H&P (VIEW-ONLY)
"[address], [address]  [facility] [address]  Phone: [phone]  Primary Provider: Karson Bishop  Pre-op Performing Provider: WILBUR SCHMIDT      PREOPERATIVE EVALUATION:  Today's date: 6/27/2022    Hermilo Velasquez is a 89 year old male who presents for a preoperative evaluation.    Surgical Information:  Surgery/Procedure: Right Total Knee Arthoplasty  Surgery Location: [facility]  Surgeon: Dr. Deshpande  Surgery Date: 7/25/2022  Time of Surgery: 10\"30am  Where patient plans to recover: At home with family  Fax number for surgical facility: Note does not need to be faxed, will be available electronically in Epic.    Type of Anesthesia Anticipated: Choice    Assessment & Plan     The proposed surgical procedure is considered INTERMEDIATE risk.    Problem List Items Addressed This Visit    None     Visit Diagnoses     Preop general physical exam    -  Primary    Relevant Orders    CBC with platelets (Completed)    Right knee pain, unspecified chronicity                   Risks and Recommendations:  The patient has the following additional risks and recommendations for perioperative complications:   - No identified additional risk factors other than previously addressed    Medication Instructions:  Patient is to take all scheduled medications on the day of surgery EXCEPT for modifications listed below:  Hold eliquis 2 days before surgery   No vitamins morning of surgery       RECOMMENDATION:  APPROVAL GIVEN to proceed with proposed procedure, without further diagnostic evaluation.        Subjective     HPI related to upcoming procedure: going for TKA   Has been having anemia, treated with iron. Last iron infusion was June 14   Has no complaints today   He's a busy body and the knee has been preventing him from doing his normal tasks       Preop Questions 6/27/2022   1. Have you ever had a heart attack or stroke? No   2. Have you ever had surgery on your heart or blood " vessels, such as a stent placement, a coronary artery bypass, or surgery on an artery in your head, neck, heart, or legs? No   3. Do you have chest pain with activity? No   4. Do you have a history of  heart failure? No   5. Do you currently have a cold, bronchitis or symptoms of other infection? No   6. Do you have a cough, shortness of breath, or wheezing? No   7. Do you or anyone in your family have previous history of blood clots? YES - DVT during hospitalization   8. Do you or does anyone in your family have a serious bleeding problem such as prolonged bleeding following surgeries or cuts? No   9. Have you ever had problems with anemia or been told to take iron pills? YES - current    10. Have you had any abnormal blood loss such as black, tarry or bloody stools? No   11. Have you ever had a blood transfusion? YES -    11a. Have you ever had a transfusion reaction? No   12. Are you willing to have a blood transfusion if it is medically needed before, during, or after your surgery? Yes   13. Have you or any of your relatives ever had problems with anesthesia? No   14. Do you have sleep apnea, excessive snoring or daytime drowsiness? No   15. Do you have any artifical heart valves or other implanted medical devices like a pacemaker, defibrillator, or continuous glucose monitor? No   16. Do you have artificial joints? YES -    17. Are you allergic to latex? No     Health Care Directive:  Patient has a Health Care Directive on file      Preoperative Review of :   reviewed - controlled substances reflected in medication list.      Status of Chronic Conditions:  See problem list for active medical problems.  Problems all longstanding and stable, except as noted/documented.  See ROS for pertinent symptoms related to these conditions.      Review of Systems  Constitutional, neuro, ENT, endocrine, pulmonary, cardiac, gastrointestinal, genitourinary, musculoskeletal, integument and psychiatric systems are negative,  except as otherwise noted.    Patient Active Problem List    Diagnosis Date Noted     Hemarthrosis 05/08/2022     Priority: Medium     Acute on chronic diastolic congestive heart failure (H) 05/27/2021     Priority: Medium     History of pulmonary embolism 05/27/2021     Priority: Medium     Renal insufficiency 04/13/2021     Priority: Medium     Elevated troponin 04/13/2021     Priority: Medium     Pneumonia of left lower lobe due to infectious organism 04/13/2021     Priority: Medium     Gastrointestinal hemorrhage, unspecified gastrointestinal hemorrhage type 10/23/2020     Priority: Medium     Anemia, unspecified type 10/23/2020     Priority: Medium     CKD (chronic kidney disease) stage 3, GFR 30-59 ml/min 09/28/2020     Priority: Medium     FRED (acute kidney injury) (H) 09/14/2020     Priority: Medium     Gastric AVM's -- on EGD 8/17/20 09/14/2020     Priority: Medium     Anemia, iron deficiency 09/11/2020     Priority: Medium     History of non-Hodgkin's lymphoma 08/15/2020     Priority: Medium     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 08/15/2020     Priority: Medium     Feb 21, 2020 Entered By: FAHAD SALCIDO Comment: s/p RIGHT lobectomy in 3/2019       History of aortic valve replacement with tissue graft 08/15/2020     Priority: Medium     Feb 21, 2020 Entered By: FAHAD SALCIDO Comment: tissue valve in 2015       Centrilobular emphysema (H) 07/07/2020     Priority: Medium     On home oxygen 2L/min by NC       Malignant neoplasm of lung, unspecified laterality, unspecified part of lung (H) 01/13/2020     Priority: Medium     Nodule of lower lobe of right lung 03/26/2019     Priority: Medium     Spongiotic dermatitis 03/01/2019     Priority: Medium     Iron deficiency anemia due to chronic blood loss 08/30/2018     Priority: Medium     Chronic obstructive pulmonary disease, unspecified COPD type (H) 03/26/2018     Priority: Medium     Hyperlipidemia LDL goal <130 01/10/2018     Priority: Medium      Bullous pemphigoid 05/22/2017     Priority: Medium     Essential hypertension with goal blood pressure less than 140/90 08/19/2016     Priority: Medium     Non-Hodgkin's lymphoma, unspecified body region, unspecified non-Hodgkin lymphoma type (H) 08/19/2016     Priority: Medium     Pulmonary nodules 06/10/2016     Priority: Medium     Need for SBE (subacute bacterial endocarditis) prophylaxis      Priority: Medium     Paroxysmal atrial fib -- on Eliquis  09/14/2015     Priority: Medium     Post-op 2015       Anemia due to blood loss, acute 09/11/2015     Priority: Medium     Heart murmur, systolic 01/23/2015     Priority: Medium     CARDIOVASCULAR SCREENING; LDL GOAL LESS THAN 130 05/17/2014     Priority: Medium     Health Care Home 08/21/2013     Priority: Medium     Tiana Beatty BS, RN, PHN  [facility] Care Coordinator  [phone]                        Microscopic hematuria 08/19/2013     Priority: Medium     C[facility] 08/2013       Non Hodgkin's lymphoma (H): 2013 06/10/2013     Priority: Medium     Dr. 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 Vasques      Aortic stenosis     Severe AS, 9/2015 AVR - [facility] TRIFECTA Bovine bioprosthesis 25MM TF-25A     Atrial fibrillation (H)     9/2015 Paroxysmal post op Afib - discharged on Warfarin and a beta blocker     COPD (chronic obstructive pulmonary disease) (H)      Deep vein thrombosis (H)      GERD (gastroesophageal reflux disease)      Heart murmur      Monoclonal gammopathy     plasmacyte prominent causing monoclonal gammopathy     Need for SBE (subacute bacterial endocarditis) prophylaxis      Neuropathy      Other and unspecified hyperlipidemia       Other malignant lymphomas     non hodgkin's lymphoma     RBBB (right bundle branch block)      Severe sepsis with acute organ dysfunction (H) 11/16/2015     Unspecified hereditary and idiopathic peripheral neuropathy      Past Surgical History:   Procedure Laterality Date     APPENDECTOMY       AS TOTAL KNEE ARTHROPLASTY       BACK SURGERY       ESOPHAGOSCOPY, GASTROSCOPY, DUODENOSCOPY (EGD), COMBINED N/A 11/28/2015    Procedure: COMBINED ESOPHAGOSCOPY, GASTROSCOPY, DUODENOSCOPY (EGD);  Surgeon: Danis Castillo MD;  Location:  GI     ESOPHAGOSCOPY, GASTROSCOPY, DUODENOSCOPY (EGD), COMBINED N/A 7/26/2018    Procedure: COMBINED ESOPHAGOSCOPY, GASTROSCOPY, DUODENOSCOPY (EGD);;  Surgeon: Toby Dong DO;  Location:  GI     ESOPHAGOSCOPY, GASTROSCOPY, DUODENOSCOPY (EGD), COMBINED N/A 8/17/2020    Procedure: ESOPHAGOGASTRODUODENOSCOPY (EGD);  Surgeon: Herrera Henry MD;  Location:  GI     ESOPHAGOSCOPY, GASTROSCOPY, DUODENOSCOPY (EGD), COMBINED N/A 10/24/2020    Procedure: ESOPHAGOGASTRODUODENOSCOPY (EGD);  Surgeon: Vick Florentino MD;  Location:  GI     ESOPHAGOSCOPY, GASTROSCOPY, DUODENOSCOPY (EGD), COMBINED N/A 4/11/2022    Procedure: ESOPHAGOGASTRODUODENOSCOPY (EGD);  Surgeon: Vick Florentino MD;  Location:  GI     HERNIA REPAIR  2006     HERNIORRHAPHY VENTRAL  4/17/2013    Procedure: HERNIORRHAPHY VENTRAL;  VENTRAL HERNIA REPAIR WITH MESH;  Surgeon: Patel Guzman MD;  Location:  OR     KNEE SURGERY      arthroscopic right knee surgery      LOBECTOMY LUNG Right 3/26/2019    POSSIBLE LOBECTOMY LUNG per Dr. Vasques at [facility]     REPAIR LIGAMENT ANKLE  2/23/2012    Procedure:REPAIR LIGAMENT ANKLE; LEFT TARSAL TUNNEL RELEASE OF KNOT OF ARIEL RELEASE; Surgeon:SAUL PUENTE; Location: OR     REPLACE VALVE AORTIC N/A 9/3/2015    Procedure: REPLACE VALVE AORTIC;  Surgeon: Antonino Mitchell MD;  Location:  OR     ROTATOR CUFF REPAIR RT/LT      bilateral      SPINE SURGERY      3 spine surgeries     THORACOTOMY, WEDGE RESECTION LUNG, COMBINED Right 3/26/2019    RIGHT THORACOTOMY, WEDGE RESECTION, RIGHT LOWER LOBE LUNG NODULE,;  Surgeon: Jose A Vasques MD;  Location: [facility] OR     TONSILLECTOMY       TURP       Current Outpatient Medications   Medication Sig Dispense Refill     ACE/ARB/ARNI NOT PRESCRIBED (INTENTIONAL) Please choose reason not prescribed from choices below.       acetaminophen (TYLENOL) 500 MG tablet Take 2 tablets (1,000 mg) by mouth 3 times daily       albuterol (PROAIR HFA/PROVENTIL HFA/VENTOLIN HFA) 108 (90 Base) MCG/ACT inhaler Inhale 1-2 puffs into the lungs every 6 hours as needed for shortness of breath / dyspnea or wheezing 3 Inhaler 5     albuterol (PROVENTIL) (2.5 MG/3ML) 0.083% neb solution Take 1 vial (2.5 mg) by nebulization every 6 hours as needed for shortness of breath / dyspnea or wheezing 180 mL 11     apixaban ANTICOAGULANT (ELIQUIS) 5 MG tablet Take 5 mg by mouth in the morning and 5 mg in the evening.       atorvastatin (LIPITOR) 10 MG tablet Take 1 tablet (10 mg) by mouth daily 90 tablet 2     DULoxetine (CYMBALTA) 60 MG capsule Take 60 mg by mouth daily       famotidine (PEPCID) 40 MG tablet Take 40 mg by mouth 2 times daily       ferrous sulfate (FE TABS) 325 (65 Fe) MG EC tablet Take 1 tablet (325 mg) by mouth daily 90 tablet 3     fluticasone-vilanterol (BREO ELLIPTA) 200-25 MCG/INH inhaler Inhale 1 puff into the lungs daily       gabapentin (NEURONTIN) 300 MG capsule Take 600 mg by mouth At Bedtime       hydrOXYzine (VISTARIL) 50 MG capsule Take 50 mg by mouth every 6 hours as needed for itching       metoprolol succinate ER (TOPROL-XL) 25 MG 24 hr tablet Take 0.5 tablets (12.5 mg) by mouth daily 45 tablet 0     triamcinolone (KENALOG) 0.1 % external cream Apply topically 2 times daily as needed for irritation 453.6 g 3       Allergies   Allergen Reactions     Lidocaine Blisters     Allergy to lidocaine ointment      "Omeprazole Itching     Pantoprazole Itching     Prevacid [Lansoprazole] Itching     Lasix [Furosemide] Rash     Penicillins Rash     \"broke out from injection\" 60 yrs ago  Tolerates cephalosporins        Social History     Tobacco Use     Smoking status: Former Smoker     Packs/day: 2.00     Years: 55.00     Pack years: 110.00     Quit date: 1998     Years since quittin.4     Smokeless tobacco: Never Used   Substance Use Topics     Alcohol use: Yes     Alcohol/week: 0.0 standard drinks     Comment: 1 drink per day     Family History   Problem Relation Age of Onset     C.A.D. Father      Emphysema Father      Melanoma No family hx of      Skin Cancer No family hx of      History   Drug Use No         Objective     /73 (BP Location: Right arm, Patient Position: Sitting, Cuff Size: Adult Regular)   Pulse 77   Temp 96.9  F (36.1  C) (Temporal)   Resp 18   Ht 1.778 m (5' 10\")   Wt 70.8 kg (156 lb)   SpO2 94%   BMI 22.38 kg/m      Physical Exam    GENERAL APPEARANCE: healthy, alert and no distress. [place]     EYES: EOMI     RESP: lungs clear to auscultation - no rales, rhonchi or wheezes     CV: regular rates and rhythm, normal S1 S2, no S3 or S4 and no murmur, click or rub     MS: extremities normal- no gross deformities noted, no evidence of inflammation in joints, FROM in all extremities.     SKIN: no suspicious lesions or rashes     NEURO: Normal strength and tone, sensory exam grossly normal, mentation intact and speech normal. Ambulates with walker      PSYCH: mentation appears normal. and affect normal/bright    Recent Labs   Lab Test 22  1511 05/10/22  0717 22  0917 22  2336 22  1223 10/24/20  0037 10/23/20  1617   HGB 10.3* 9.2* 9.6*   < > 11.2*   < > 6.6*    226 179  --  244   < > 279   INR  --   --   --   --  1.99*  --  1.39*   NA  --   --  135  --  137   < > 142   POTASSIUM  --   --  3.6  --  4.3   < > 3.9   CR  --   --  0.92  --  1.13   < > 1.25    < > = " values in this interval not displayed.        Diagnostics:  Recent Results (from the past 24 hour(s))   CBC with platelets    Collection Time: 06/27/22  4:23 PM   Result Value Ref Range    WBC Count 10.9 4.0 - 11.0 10e3/uL    RBC Count 4.48 4.40 - 5.90 10e6/uL    Hemoglobin 12.1 (L) 13.3 - 17.7 g/dL    Hematocrit 39.4 (L) 40.0 - 53.0 %    MCV 88 78 - 100 fL    MCH 27.0 26.5 - 33.0 pg    MCHC 30.7 (L) 31.5 - 36.5 g/dL    RDW 17.1 (H) 10.0 - 15.0 %    Platelet Count 158 150 - 450 10e3/uL      No EKG this visit, completed in the last 90 days.    Revised Cardiac Risk Index (RCRI):  The patient has the following serious cardiovascular risks for perioperative complications:   - No serious cardiac risks = 0 points     RCRI Interpretation: 0 points: Class I (very low risk - 0.4% complication rate)           Signed Electronically by: ILYA Carmona CNP  Copy of this evaluation report is provided to requesting physician.

## 2022-06-27 NOTE — PROGRESS NOTES
"[address], [address]  [facility] [address]  Phone: [phone]  Primary Provider: Karson Bishop  Pre-op Performing Provider: WILBUR SCHMIDT      PREOPERATIVE EVALUATION:  Today's date: 6/27/2022    Hermilo Velasquez is a 89 year old male who presents for a preoperative evaluation.    Surgical Information:  Surgery/Procedure: Right Total Knee Arthoplasty  Surgery Location: [facility]  Surgeon: Dr. Deshpande  Surgery Date: 7/25/2022  Time of Surgery: 10\"30am  Where patient plans to recover: At home with family  Fax number for surgical facility: Note does not need to be faxed, will be available electronically in Epic.    Type of Anesthesia Anticipated: Choice    Assessment & Plan     The proposed surgical procedure is considered INTERMEDIATE risk.    Problem List Items Addressed This Visit    None     Visit Diagnoses     Preop general physical exam    -  Primary    Relevant Orders    CBC with platelets (Completed)    Right knee pain, unspecified chronicity                   Risks and Recommendations:  The patient has the following additional risks and recommendations for perioperative complications:   - No identified additional risk factors other than previously addressed    Medication Instructions:  Patient is to take all scheduled medications on the day of surgery EXCEPT for modifications listed below:  Hold eliquis 2 days before surgery   No vitamins morning of surgery       RECOMMENDATION:  APPROVAL GIVEN to proceed with proposed procedure, without further diagnostic evaluation.        Subjective     HPI related to upcoming procedure: going for TKA   Has been having anemia, treated with iron. Last iron infusion was June 14   Has no complaints today   He's a busy body and the knee has been preventing him from doing his normal tasks       Preop Questions 6/27/2022   1. Have you ever had a heart attack or stroke? No   2. Have you ever had surgery on your heart or blood " vessels, such as a stent placement, a coronary artery bypass, or surgery on an artery in your head, neck, heart, or legs? No   3. Do you have chest pain with activity? No   4. Do you have a history of  heart failure? No   5. Do you currently have a cold, bronchitis or symptoms of other infection? No   6. Do you have a cough, shortness of breath, or wheezing? No   7. Do you or anyone in your family have previous history of blood clots? YES - DVT during hospitalization   8. Do you or does anyone in your family have a serious bleeding problem such as prolonged bleeding following surgeries or cuts? No   9. Have you ever had problems with anemia or been told to take iron pills? YES - current    10. Have you had any abnormal blood loss such as black, tarry or bloody stools? No   11. Have you ever had a blood transfusion? YES -    11a. Have you ever had a transfusion reaction? No   12. Are you willing to have a blood transfusion if it is medically needed before, during, or after your surgery? Yes   13. Have you or any of your relatives ever had problems with anesthesia? No   14. Do you have sleep apnea, excessive snoring or daytime drowsiness? No   15. Do you have any artifical heart valves or other implanted medical devices like a pacemaker, defibrillator, or continuous glucose monitor? No   16. Do you have artificial joints? YES -    17. Are you allergic to latex? No     Health Care Directive:  Patient has a Health Care Directive on file      Preoperative Review of :   reviewed - controlled substances reflected in medication list.      Status of Chronic Conditions:  See problem list for active medical problems.  Problems all longstanding and stable, except as noted/documented.  See ROS for pertinent symptoms related to these conditions.      Review of Systems  Constitutional, neuro, ENT, endocrine, pulmonary, cardiac, gastrointestinal, genitourinary, musculoskeletal, integument and psychiatric systems are negative,  except as otherwise noted.    Patient Active Problem List    Diagnosis Date Noted     Hemarthrosis 05/08/2022     Priority: Medium     Acute on chronic diastolic congestive heart failure (H) 05/27/2021     Priority: Medium     History of pulmonary embolism 05/27/2021     Priority: Medium     Renal insufficiency 04/13/2021     Priority: Medium     Elevated troponin 04/13/2021     Priority: Medium     Pneumonia of left lower lobe due to infectious organism 04/13/2021     Priority: Medium     Gastrointestinal hemorrhage, unspecified gastrointestinal hemorrhage type 10/23/2020     Priority: Medium     Anemia, unspecified type 10/23/2020     Priority: Medium     CKD (chronic kidney disease) stage 3, GFR 30-59 ml/min 09/28/2020     Priority: Medium     FRED (acute kidney injury) (H) 09/14/2020     Priority: Medium     Gastric AVM's -- on EGD 8/17/20 09/14/2020     Priority: Medium     Anemia, iron deficiency 09/11/2020     Priority: Medium     History of non-Hodgkin's lymphoma 08/15/2020     Priority: Medium     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 08/15/2020     Priority: Medium     Feb 21, 2020 Entered By: FAHAD SALCIDO Comment: s/p RIGHT lobectomy in 3/2019       History of aortic valve replacement with tissue graft 08/15/2020     Priority: Medium     Feb 21, 2020 Entered By: FAHAD SALCIDO Comment: tissue valve in 2015       Centrilobular emphysema (H) 07/07/2020     Priority: Medium     On home oxygen 2L/min by NC       Malignant neoplasm of lung, unspecified laterality, unspecified part of lung (H) 01/13/2020     Priority: Medium     Nodule of lower lobe of right lung 03/26/2019     Priority: Medium     Spongiotic dermatitis 03/01/2019     Priority: Medium     Iron deficiency anemia due to chronic blood loss 08/30/2018     Priority: Medium     Chronic obstructive pulmonary disease, unspecified COPD type (H) 03/26/2018     Priority: Medium     Hyperlipidemia LDL goal <130 01/10/2018     Priority: Medium      Bullous pemphigoid 05/22/2017     Priority: Medium     Essential hypertension with goal blood pressure less than 140/90 08/19/2016     Priority: Medium     Non-Hodgkin's lymphoma, unspecified body region, unspecified non-Hodgkin lymphoma type (H) 08/19/2016     Priority: Medium     Pulmonary nodules 06/10/2016     Priority: Medium     Need for SBE (subacute bacterial endocarditis) prophylaxis      Priority: Medium     Paroxysmal atrial fib -- on Eliquis  09/14/2015     Priority: Medium     Post-op 2015       Anemia due to blood loss, acute 09/11/2015     Priority: Medium     Heart murmur, systolic 01/23/2015     Priority: Medium     CARDIOVASCULAR SCREENING; LDL GOAL LESS THAN 130 05/17/2014     Priority: Medium     Health Care Home 08/21/2013     Priority: Medium     Tiana Beatty BS, RN, PHN  [facility] [place] Care Coordinator  [phone]                        Microscopic hematuria 08/19/2013     Priority: Medium     [facility] 08/2013       Non Hodgkin's lymphoma (H): 2013 06/10/2013     Priority: Medium     Dr. 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 Vasques      Aortic stenosis     Severe AS, 9/2015 AVR - [facility] TRIFECTA Bovine bioprosthesis 25MM TF-25A     Atrial fibrillation (H)     9/2015 Paroxysmal post op Afib - discharged on Warfarin and a beta blocker     COPD (chronic obstructive pulmonary disease) (H)      Deep vein thrombosis (H)      GERD (gastroesophageal reflux disease)      Heart murmur      Monoclonal gammopathy     plasmacyte prominent causing monoclonal gammopathy     Need for SBE (subacute bacterial endocarditis) prophylaxis      Neuropathy      Other and unspecified hyperlipidemia       Other malignant lymphomas     non hodgkin's lymphoma     RBBB (right bundle branch block)      Severe sepsis with acute organ dysfunction (H) 11/16/2015     Unspecified hereditary and idiopathic peripheral neuropathy      Past Surgical History:   Procedure Laterality Date     APPENDECTOMY       AS TOTAL KNEE ARTHROPLASTY       BACK SURGERY       ESOPHAGOSCOPY, GASTROSCOPY, DUODENOSCOPY (EGD), COMBINED N/A 11/28/2015    Procedure: COMBINED ESOPHAGOSCOPY, GASTROSCOPY, DUODENOSCOPY (EGD);  Surgeon: Danis Castillo MD;  Location:  GI     ESOPHAGOSCOPY, GASTROSCOPY, DUODENOSCOPY (EGD), COMBINED N/A 7/26/2018    Procedure: COMBINED ESOPHAGOSCOPY, GASTROSCOPY, DUODENOSCOPY (EGD);;  Surgeon: Toby Dong DO;  Location:  GI     ESOPHAGOSCOPY, GASTROSCOPY, DUODENOSCOPY (EGD), COMBINED N/A 8/17/2020    Procedure: ESOPHAGOGASTRODUODENOSCOPY (EGD);  Surgeon: Herrera Henry MD;  Location:  GI     ESOPHAGOSCOPY, GASTROSCOPY, DUODENOSCOPY (EGD), COMBINED N/A 10/24/2020    Procedure: ESOPHAGOGASTRODUODENOSCOPY (EGD);  Surgeon: Vick Florentino MD;  Location:  GI     ESOPHAGOSCOPY, GASTROSCOPY, DUODENOSCOPY (EGD), COMBINED N/A 4/11/2022    Procedure: ESOPHAGOGASTRODUODENOSCOPY (EGD);  Surgeon: Vick Florentino MD;  Location:  GI     HERNIA REPAIR  2006     HERNIORRHAPHY VENTRAL  4/17/2013    Procedure: HERNIORRHAPHY VENTRAL;  VENTRAL HERNIA REPAIR WITH MESH;  Surgeon: Patel Guzman MD;  Location:  OR     KNEE SURGERY      arthroscopic right knee surgery      LOBECTOMY LUNG Right 3/26/2019    POSSIBLE LOBECTOMY LUNG per Dr. Vasques at [facility]     REPAIR LIGAMENT ANKLE  2/23/2012    Procedure:REPAIR LIGAMENT ANKLE; LEFT TARSAL TUNNEL RELEASE OF KNOT OF ARIEL RELEASE; Surgeon:SAUL PUENTE; Location: OR     REPLACE VALVE AORTIC N/A 9/3/2015    Procedure: REPLACE VALVE AORTIC;  Surgeon: Antonino Mitchell MD;  Location:  OR     ROTATOR CUFF REPAIR RT/LT      bilateral      SPINE SURGERY      3 spine surgeries     THORACOTOMY, WEDGE RESECTION LUNG, COMBINED Right 3/26/2019    RIGHT THORACOTOMY, WEDGE RESECTION, RIGHT LOWER LOBE LUNG NODULE,;  Surgeon: Jose A Vasques MD;  Location: [facility] OR     TONSILLECTOMY       TURP       Current Outpatient Medications   Medication Sig Dispense Refill     ACE/ARB/ARNI NOT PRESCRIBED (INTENTIONAL) Please choose reason not prescribed from choices below.       acetaminophen (TYLENOL) 500 MG tablet Take 2 tablets (1,000 mg) by mouth 3 times daily       albuterol (PROAIR HFA/PROVENTIL HFA/VENTOLIN HFA) 108 (90 Base) MCG/ACT inhaler Inhale 1-2 puffs into the lungs every 6 hours as needed for shortness of breath / dyspnea or wheezing 3 Inhaler 5     albuterol (PROVENTIL) (2.5 MG/3ML) 0.083% neb solution Take 1 vial (2.5 mg) by nebulization every 6 hours as needed for shortness of breath / dyspnea or wheezing 180 mL 11     apixaban ANTICOAGULANT (ELIQUIS) 5 MG tablet Take 5 mg by mouth in the morning and 5 mg in the evening.       atorvastatin (LIPITOR) 10 MG tablet Take 1 tablet (10 mg) by mouth daily 90 tablet 2     DULoxetine (CYMBALTA) 60 MG capsule Take 60 mg by mouth daily       famotidine (PEPCID) 40 MG tablet Take 40 mg by mouth 2 times daily       ferrous sulfate (FE TABS) 325 (65 Fe) MG EC tablet Take 1 tablet (325 mg) by mouth daily 90 tablet 3     fluticasone-vilanterol (BREO ELLIPTA) 200-25 MCG/INH inhaler Inhale 1 puff into the lungs daily       gabapentin (NEURONTIN) 300 MG capsule Take 600 mg by mouth At Bedtime       hydrOXYzine (VISTARIL) 50 MG capsule Take 50 mg by mouth every 6 hours as needed for itching       metoprolol succinate ER (TOPROL-XL) 25 MG 24 hr tablet Take 0.5 tablets (12.5 mg) by mouth daily 45 tablet 0     triamcinolone (KENALOG) 0.1 % external cream Apply topically 2 times daily as needed for irritation 453.6 g 3       Allergies   Allergen Reactions     Lidocaine Blisters     Allergy to lidocaine ointment      "Omeprazole Itching     Pantoprazole Itching     Prevacid [Lansoprazole] Itching     Lasix [Furosemide] Rash     Penicillins Rash     \"broke out from injection\" 60 yrs ago  Tolerates cephalosporins        Social History     Tobacco Use     Smoking status: Former Smoker     Packs/day: 2.00     Years: 55.00     Pack years: 110.00     Quit date: 1998     Years since quittin.4     Smokeless tobacco: Never Used   Substance Use Topics     Alcohol use: Yes     Alcohol/week: 0.0 standard drinks     Comment: 1 drink per day     Family History   Problem Relation Age of Onset     C.A.D. Father      Emphysema Father      Melanoma No family hx of      Skin Cancer No family hx of      History   Drug Use No         Objective     /73 (BP Location: Right arm, Patient Position: Sitting, Cuff Size: Adult Regular)   Pulse 77   Temp 96.9  F (36.1  C) (Temporal)   Resp 18   Ht 1.778 m (5' 10\")   Wt 70.8 kg (156 lb)   SpO2 94%   BMI 22.38 kg/m      Physical Exam    GENERAL APPEARANCE: healthy, alert and no distress. [place]     EYES: EOMI     RESP: lungs clear to auscultation - no rales, rhonchi or wheezes     CV: regular rates and rhythm, normal S1 S2, no S3 or S4 and no murmur, click or rub     MS: extremities normal- no gross deformities noted, no evidence of inflammation in joints, FROM in all extremities.     SKIN: no suspicious lesions or rashes     NEURO: Normal strength and tone, sensory exam grossly normal, mentation intact and speech normal. Ambulates with walker      PSYCH: mentation appears normal. and affect normal/bright    Recent Labs   Lab Test 22  1511 05/10/22  0717 22  0917 22  2336 22  1223 10/24/20  0037 10/23/20  1617   HGB 10.3* 9.2* 9.6*   < > 11.2*   < > 6.6*    226 179  --  244   < > 279   INR  --   --   --   --  1.99*  --  1.39*   NA  --   --  135  --  137   < > 142   POTASSIUM  --   --  3.6  --  4.3   < > 3.9   CR  --   --  0.92  --  1.13   < > 1.25    < > = " values in this interval not displayed.        Diagnostics:  Recent Results (from the past 24 hour(s))   CBC with platelets    Collection Time: 06/27/22  4:23 PM   Result Value Ref Range    WBC Count 10.9 4.0 - 11.0 10e3/uL    RBC Count 4.48 4.40 - 5.90 10e6/uL    Hemoglobin 12.1 (L) 13.3 - 17.7 g/dL    Hematocrit 39.4 (L) 40.0 - 53.0 %    MCV 88 78 - 100 fL    MCH 27.0 26.5 - 33.0 pg    MCHC 30.7 (L) 31.5 - 36.5 g/dL    RDW 17.1 (H) 10.0 - 15.0 %    Platelet Count 158 150 - 450 10e3/uL      No EKG this visit, completed in the last 90 days.    Revised Cardiac Risk Index (RCRI):  The patient has the following serious cardiovascular risks for perioperative complications:   - No serious cardiac risks = 0 points     RCRI Interpretation: 0 points: Class I (very low risk - 0.4% complication rate)           Signed Electronically by: ILYA Carmona CNP  Copy of this evaluation report is provided to requesting physician.

## 2022-06-27 NOTE — PATIENT INSTRUCTIONS
Preparing for Your Surgery  Getting started  A nurse will call you to review your health history and instructions. They will give you an arrival time based on your scheduled surgery time. Please be ready to share:  Your doctor's clinic name and phone number  Your medical, surgical and anesthesia history  A list of allergies and sensitivities  A list of medicines, including herbal treatments and over-the-counter drugs  Whether the patient has a legal guardian (ask how to send us the papers in advance)  Please tell us if you're pregnant--or if there's any chance you might be pregnant. Some surgeries may injure a fetus (unborn baby), so they require a pregnancy test. Surgeries that are safe for a fetus don't always need a test, and you can choose whether to have one.   If you have a child who's having surgery, please ask for a copy of Preparing for Your Child's Surgery.    Preparing for surgery  Within 30 days of surgery: Have a pre-op exam (sometimes called an H&P, or History and Physical). This can be done at a clinic or pre-operative center.  If you're having a , you may not need this exam. Talk to your care team.  At your pre-op exam, talk to your care team about all medicines you take. If you need to stop any medicines before surgery, ask when to start taking them again.  We do this for your safety. Many medicines can make you bleed too much during surgery. Some change how well surgery (anesthesia) drugs work.  Call your insurance company to let them know you're having surgery. (If you don't have insurance, call [phone].)  Call your clinic if there's any change in your health. This includes signs of a cold or flu (sore throat, runny nose, cough, rash, fever). It also includes a scrape or scratch near the surgery site.  If you have questions on the day of surgery, call your hospital or surgery center.  COVID testing  You may need to be tested for COVID-19 before having surgery. If so, we will give  you instructions.  Eating and drinking guidelines  For your safety: Unless your surgeon tells you otherwise, follow the guidelines below.  Eat and drink as usual until 8 hours before surgery. After that, no food or milk.  Drink clear liquids until 2 hours before surgery. These are liquids you can see through, like water, Gatorade and Propel Water. You may also have black coffee and tea (no cream or milk).  Nothing by mouth within 2 hours of surgery. This includes gum, candy and breath mints.  If you drink alcohol: Stop drinking it the night before surgery.  If your care team tells you to take medicine on the morning of surgery, it's okay to take it with a sip of water.  Preventing infection  Shower or bathe the night before and morning of your surgery. Follow the instructions your clinic gave you. (If no instructions, use regular soap.)  Don't shave or clip hair near your surgery site. We'll remove the hair if needed.  Don't smoke or vape the morning of surgery. You may chew nicotine gum up to 2 hours before surgery. A nicotine patch is okay.  Note: Some surgeries require you to completely quit smoking and nicotine. Check with your surgeon.  Your care team will make every effort to keep you safe from infection. We will:  Clean our hands often with soap and water (or an alcohol-based hand rub).  Clean the skin at your surgery site with a special soap that kills germs.  Give you a special gown to keep you warm. (Cold raises the risk of infection.)  Wear special hair covers, masks, gowns and gloves during surgery.  Give antibiotic medicine, if prescribed. Not all surgeries need antibiotics.  What to bring on the day of surgery  Photo ID and insurance card  Copy of your health care directive, if you have one  Glasses and hearing aides (bring cases)  You can't wear contacts during surgery  Inhaler and eye drops, if you use them (tell us about these when you arrive)  CPAP machine or breathing device, if you use them  A  few personal items, if spending the night  If you have . . .  A pacemaker, ICD (cardiac defibrillator) or other implant: Bring the ID card.  An implanted stimulator: Bring the remote control.  A legal guardian: Bring a copy of the certified (court-stamped) guardianship papers.  Please remove any jewelry, including body piercings. Leave jewelry and other valuables at home.  If you're going home the day of surgery  You must have a responsible adult drive you home. They should stay with you overnight as well.  If you don't have someone to stay with you, and you aren't safe to go home alone, we may keep you overnight. Insurance often won't pay for this.  After surgery  If it's hard to control your pain or you need more pain medicine, please call your surgeon's office.  Questions?   If you have any questions for your care team, list them here: _________________________________________________________________________________________________________________________________________________________________________ ____________________________________ ____________________________________ ____________________________________  For informational purposes only. Not to replace the advice of your health care provider. Copyright   2003, 2019 [employer]. All rights reserved. Clinically reviewed by Aurora Chaudhary MD. [employer] [id number] - REV 07/21.    Before Your Procedure or Hospital Admission  Testing for COVID-19  Thank you for choosing [facility] for your health care needs.The COVID-19 pandemic is a very challenging time for everyone.   Our goal is to keep you and our team here at [facility] safe and healthy. We've taken many steps to make this happen. For example:  We test and screen our staff, care teams and patients for COVID-19.  Everyone at [facility] must wear a mask and stay 6 feet apart.  We are limiting hospital and clinic visitors.  Before you come in  You must get tested for COVID-19, even  "if you've been vaccinated.  If you're going home on the same day as your procedure (surgery):  1 to 2 days before your procedure, take an at-home, rapid antigen test. You can buy these at many pharmacy stores. Or you can order free, at-home tests at [email or web address]/tests. If you can't find an at-home, rapid antigen test, please schedule a PCR test with [employer] by calling [employer] [place], or visiting [employer]/[email or web address]. We can't accept tests that are more than 4 days old.  If your test is negative, please take a photo of the test. Show the photo to the nurse when you come in for your procedure.  If your test is positive, please see the \"If your test shows you have COVID-19\" section on this page.  If you're staying overnight in the hospital:  4 days before your procedure, please get a   PCR test from a lab.  To schedule a PCR test with [employer], call [phone]. Or, visit   [employer]/covid19.  If your test is negative, please ask the testing location to fax your results to us at [phone].  If your test is positive, please see the \"If your test shows you have COVID-19\" section below.  After your test and before your procedure, please follow these safetyguidelines:  Limit trips outside your home.  Limit the number of people you see.  Always wear a face covering outside your home.  Use social distancing. Stay 6 feet away from others whenever you can.  Wash your hands often.  If your test shows you have COVID-19  If your test is positive, please tell your surgeon's office right away. A positive test means that you have COVID-19.  We'll probably have to postpone your admission, surgery or procedure. Your care team will discuss this with you. After that, we'll let you know what to do and when you can re-schedule.  If your test shows you DON'T have COVID-19  Even if your test is negative, you can still get COVID-19. It's rare, but sometimes the test result is wrong. " You could also catch the virus after taking the test.   There's a very small chance that you could catch COVID-19 in the hospital or surgery center. [facility] has taken many steps to prevent this from happening.   Possible surgery delay   Like you, we want your surgery to happen when it's scheduled. But sometimes the hospital is so full that it's not safe for you to have your surgery. This is especially true during the pandemic. Your surgery may need to be re-scheduled at a later date. If thishappens, we will call and tell you.   Day of your surgery or procedure  Please come wearing a face covering that covers both your nose and mouth.  When you arrive, we'll ask you some questions to find out if you've had any exposures to COVID-19, or have any signs of COVID-19.  No matter where you took a test, we'll need to have the results. Please ask your testing location to fax the results to us at [phone]. If you took a rapid antigen at-home test, you can bring a photo of the results with you to your procedure.  Ask your care team if you can have visitors. All visitors must wear face coverings and will be screened for exposure to, or signs of, COVID-19.  The rules for visitors change often, depending on how much the virus is spreading. To learn more, see Visiting a Loved One in the Hospital during the COVID-19 Outbreak.  Please call your care team, hospital or surgery center if you have any questions. We thank you for your understanding and for choosing [facility] for your care.   Questions and answers  Does it matter how I get tested for COVID-19?  Yes. If the plan is for you to go home on the same day as your procedure, you can take a rapid antigen, at-home test 1 to 2 days before you come in. If your test is negative, please take a photo of your test. Show it to the nurse when you come to the hospital. If you can't find a rapid antigen, at-home test, you can take a PCR test at a lab instead.  If the  plan is for you to stay in the hospital after your surgery, you'll need to get a PCR test from a lab 4 days before your procedure. Ask the testing location to fax your results to [phone].  If we don't get your results, we may have to delay or cancel your treatment.  Do I need to get tested at [facility]?  No. However, if you need a PCR test from a lab, we recommend using an [facility] lab. We'll get the results more quickly and easily that way. To schedule a test, please call [phone]. Or, visit [email or web address].  For informational purposes only. Not to replace the advice of your health care provider. Copyright   2020 [facility]. All rights reserved. Clinically reviewed by Infection Prevention and the [facility] COVID-19 Clinical Team. [employer] [id number] - Rev 05/26/22.    Hold eliquis 2 days before surgery   No vitamins morning of surgery

## 2022-07-01 ENCOUNTER — TELEPHONE (OUTPATIENT)
Dept: FAMILY MEDICINE | Facility: CLINIC | Age: 87
End: 2022-07-01

## 2022-07-01 NOTE — TELEPHONE ENCOUNTER
Dilma BROOKS calling to request INR and hemoglobin.  Informed Dilma BROOKS that patient is not longer on warfarin and hemoglobin on 6/27 was 12.1. Preop states that the patient was cleared for surgery. Didi Lam RN

## 2022-07-20 NOTE — PROGRESS NOTES
Pre-op Total Joint Patient Screening    1. Do you have a ride available to come to the hospital the day after your surgery by 8am with anticipated discharge of 11am? N  2. What is the name of this person? Roberta (spouse)- pt's spouse has a doctor appointment on the morning of discharge, Tuesday. She will not be available until 1:30pm  3. Do you have a  set up after surgery? Y-Pt will be discharged to [facility] for post-op home care.  4. Will your  be the same person that gives you a ride home after surgery? N/A  5. Have you received the Joint Replacement Guidebook? Y  6. Do you have any questions about your guidebook? N  7. Have you activated your [employer] account? N  8. Have you signed up for MY Chart access? Y

## 2022-07-22 ENCOUNTER — LAB (OUTPATIENT)
Dept: URGENT CARE | Facility: URGENT CARE | Age: 87
End: 2022-07-22
Payer: COMMERCIAL

## 2022-07-22 DIAGNOSIS — Z20.822 COVID-19 RULED OUT: Primary | ICD-10-CM

## 2022-07-22 DIAGNOSIS — Z20.822 COVID-19 RULED OUT: ICD-10-CM

## 2022-07-22 PROCEDURE — 99207 PR NO CHARGE LOS: CPT

## 2022-07-22 PROCEDURE — U0003 INFECTIOUS AGENT DETECTION BY NUCLEIC ACID (DNA OR RNA); SEVERE ACUTE RESPIRATORY SYNDROME CORONAVIRUS 2 (SARS-COV-2) (CORONAVIRUS DISEASE [COVID-19]), AMPLIFIED PROBE TECHNIQUE, MAKING USE OF HIGH THROUGHPUT TECHNOLOGIES AS DESCRIBED BY CMS-2020-01-R: HCPCS

## 2022-07-22 PROCEDURE — U0005 INFEC AGEN DETEC AMPLI PROBE: HCPCS

## 2022-07-22 RX ORDER — ALBUTEROL SULFATE 90 UG/1
2 AEROSOL, METERED RESPIRATORY (INHALATION) EVERY 6 HOURS PRN
COMMUNITY

## 2022-07-22 RX ORDER — DIMENHYDRINATE 50 MG
50 TABLET ORAL PRN
COMMUNITY
End: 2022-10-09

## 2022-07-22 RX ORDER — FERROUS SULFATE 325(65) MG
325 TABLET, DELAYED RELEASE (ENTERIC COATED) ORAL 2 TIMES DAILY
Status: ON HOLD | COMMUNITY
End: 2024-01-01

## 2022-07-22 NOTE — PROGRESS NOTES
PTA medications updated by Medication Scribe prior to surgery via phone call with patient (last doses completed by Nurse)     Medication history sources: Patient, Surescripts, H&P and Patient's home med list  In the past week, patient estimated taking medication this percent of the time: Greater than 90%  Adherence assessment: N/A Not Observed    Significant changes made to the medication list:  Patient reports no longer taking the following meds (med scribe removed from PTA med list): Metoprolol Succinate, Triamcinolone Cream      Additional medication history information:   Patient was advised to bring: ProAir Inhaler, Breo Ellipta Inhaler    Medication reconciliation completed by provider prior to medication history? No    Time spent in this activity: 45 minutes    The information provided in this note is only as accurate as the sources available at the time of update(s)    Prior to Admission medications    Medication Sig Last Dose Taking? Auth Provider Long Term End Date   acetaminophen (TYLENOL) 500 MG tablet Take 2 tablets (1,000 mg) by mouth 3 times daily 7/22/2022 at am Yes Haase, Tonya Lynn, APRN CNP     albuterol (PROAIR HFA/PROVENTIL HFA/VENTOLIN HFA) 108 (90 Base) MCG/ACT inhaler Inhale 1-2 puffs into the lungs every 6 hours 7/22/2022 at am Yes Reported, Patient Yes    albuterol (PROVENTIL) (2.5 MG/3ML) 0.083% neb solution Take 1 vial (2.5 mg) by nebulization every 6 hours as needed for shortness of breath / dyspnea or wheezing  at prn Yes Karson Bishop MD Yes    apixaban ANTICOAGULANT (ELIQUIS) 5 MG tablet Take 5 mg by mouth in the morning and 5 mg in the evening. 7/22/2022 at pm Yes Unknown, Entered By History No    atorvastatin (LIPITOR) 10 MG tablet Take 1 tablet (10 mg) by mouth daily  at am Yes Karson Bishop MD Yes    dimenhyDRINATE (DRAMAMINE) 50 MG tablet Take 50 mg by mouth as needed  at prn Yes Reported, Patient     DULoxetine (CYMBALTA) 60 MG capsule Take 60 mg by mouth daily  at am Yes  Unknown, Entered By History No    famotidine (PEPCID) 40 MG tablet Take 40 mg by mouth 2 times daily  at am Yes Unknown, Entered By History     ferrous sulfate (FE TABS) 325 (65 Fe) MG EC tablet Take 325 mg by mouth 2 times daily  at pm Yes Reported, Patient     fluticasone-vilanterol (BREO ELLIPTA) 200-25 MCG/INH inhaler Inhale 1 puff into the lungs as needed  at prn Yes Unknown, Entered By History     gabapentin (NEURONTIN) 300 MG capsule Take 600 mg by mouth At Bedtime (2 x 300 mg = 600 mg)  at pm Yes Unknown, Entered By History No    hydrOXYzine (VISTARIL) 50 MG capsule Take 50 mg by mouth daily  at am Yes Unknown, Entered By History     ACE/ARB/ARNI NOT PRESCRIBED (INTENTIONAL) Please choose reason not prescribed from choices below.   Liv Mc MD Yes      Medication history completed by:    Glenn Jacobsen CPhT  Medication [facility]

## 2022-07-23 LAB — SARS-COV-2 RNA RESP QL NAA+PROBE: NEGATIVE

## 2022-07-25 ENCOUNTER — APPOINTMENT (OUTPATIENT)
Dept: GENERAL RADIOLOGY | Facility: CLINIC | Age: 87
DRG: 469 | End: 2022-07-25
Attending: ORTHOPAEDIC SURGERY
Payer: COMMERCIAL

## 2022-07-25 ENCOUNTER — ANESTHESIA EVENT (OUTPATIENT)
Dept: SURGERY | Facility: CLINIC | Age: 87
DRG: 469 | End: 2022-07-25
Payer: COMMERCIAL

## 2022-07-25 ENCOUNTER — APPOINTMENT (OUTPATIENT)
Dept: PHYSICAL THERAPY | Facility: CLINIC | Age: 87
DRG: 469 | End: 2022-07-25
Attending: ORTHOPAEDIC SURGERY
Payer: COMMERCIAL

## 2022-07-25 ENCOUNTER — HOSPITAL ENCOUNTER (INPATIENT)
Facility: CLINIC | Age: 87
LOS: 6 days | Discharge: NURSING, REHAB OR LONG-TERM CARE | DRG: 469 | End: 2022-07-31
Attending: ORTHOPAEDIC SURGERY | Admitting: ORTHOPAEDIC SURGERY
Payer: COMMERCIAL

## 2022-07-25 ENCOUNTER — ANESTHESIA (OUTPATIENT)
Dept: SURGERY | Facility: CLINIC | Age: 87
DRG: 469 | End: 2022-07-25
Payer: COMMERCIAL

## 2022-07-25 DIAGNOSIS — M25.561 ACUTE PAIN OF RIGHT KNEE: ICD-10-CM

## 2022-07-25 DIAGNOSIS — Z96.651 TOTAL KNEE REPLACEMENT STATUS, RIGHT: ICD-10-CM

## 2022-07-25 DIAGNOSIS — I50.810 RIGHT-SIDED HEART FAILURE, UNSPECIFIED HF CHRONICITY (H): ICD-10-CM

## 2022-07-25 DIAGNOSIS — I26.09 ACUTE COR PULMONALE (H): ICD-10-CM

## 2022-07-25 DIAGNOSIS — I48.92 ATRIAL FLUTTER, UNSPECIFIED TYPE (H): ICD-10-CM

## 2022-07-25 DIAGNOSIS — Z96.651 H/O TOTAL KNEE REPLACEMENT, RIGHT: Primary | ICD-10-CM

## 2022-07-25 DIAGNOSIS — J44.9 CHRONIC OBSTRUCTIVE PULMONARY DISEASE, UNSPECIFIED COPD TYPE (H): ICD-10-CM

## 2022-07-25 PROBLEM — N28.9 RENAL INSUFFICIENCY: Status: RESOLVED | Noted: 2021-04-13 | Resolved: 2022-07-25

## 2022-07-25 PROBLEM — H35.363 DRUSEN (DEGENERATIVE) OF MACULA, BILATERAL: Status: ACTIVE | Noted: 2022-07-25

## 2022-07-25 PROBLEM — R79.89 ELEVATED TROPONIN: Status: RESOLVED | Noted: 2021-04-13 | Resolved: 2022-07-25

## 2022-07-25 PROBLEM — R29.6 MULTIPLE FALLS: Status: ACTIVE | Noted: 2022-03-06

## 2022-07-25 PROBLEM — M96.1 LUMBAR POST-LAMINECTOMY SYNDROME: Status: ACTIVE | Noted: 2017-09-27

## 2022-07-25 PROBLEM — R29.6 MULTIPLE FALLS: Status: RESOLVED | Noted: 2022-03-06 | Resolved: 2022-07-25

## 2022-07-25 PROBLEM — N18.30 CKD (CHRONIC KIDNEY DISEASE) STAGE 3, GFR 30-59 ML/MIN (H): Status: ACTIVE | Noted: 2020-09-28

## 2022-07-25 LAB
ABO/RH(D): NORMAL
ANION GAP SERPL CALCULATED.3IONS-SCNC: 5 MMOL/L (ref 3–14)
ANTIBODY SCREEN: NEGATIVE
BUN SERPL-MCNC: 26 MG/DL (ref 7–30)
CALCIUM SERPL-MCNC: 8.7 MG/DL (ref 8.5–10.1)
CHLORIDE BLD-SCNC: 110 MMOL/L (ref 94–109)
CO2 SERPL-SCNC: 26 MMOL/L (ref 20–32)
CREAT SERPL-MCNC: 1.33 MG/DL (ref 0.66–1.25)
CREAT SERPL-MCNC: 1.33 MG/DL (ref 0.66–1.25)
GFR SERPL CREATININE-BSD FRML MDRD: 51 ML/MIN/1.73M2
GFR SERPL CREATININE-BSD FRML MDRD: 51 ML/MIN/1.73M2
GLUCOSE BLD-MCNC: 103 MG/DL (ref 70–99)
GLUCOSE BLDC GLUCOMTR-MCNC: 115 MG/DL (ref 70–99)
POTASSIUM BLD-SCNC: 3.8 MMOL/L (ref 3.4–5.3)
POTASSIUM BLD-SCNC: 3.9 MMOL/L (ref 3.4–5.3)
SODIUM SERPL-SCNC: 141 MMOL/L (ref 133–144)
SPECIMEN EXPIRATION DATE: NORMAL

## 2022-07-25 PROCEDURE — 97110 THERAPEUTIC EXERCISES: CPT | Mod: GP

## 2022-07-25 PROCEDURE — 36415 COLL VENOUS BLD VENIPUNCTURE: CPT | Performed by: ORTHOPAEDIC SURGERY

## 2022-07-25 PROCEDURE — 360N000077 HC SURGERY LEVEL 4, PER MIN: Performed by: ORTHOPAEDIC SURGERY

## 2022-07-25 PROCEDURE — 250N000011 HC RX IP 250 OP 636: Performed by: ORTHOPAEDIC SURGERY

## 2022-07-25 PROCEDURE — 999N000063 XR KNEE PORT RIGHT 1/2 VIEWS: Mod: RT

## 2022-07-25 PROCEDURE — 120N000001 HC R&B MED SURG/OB

## 2022-07-25 PROCEDURE — 250N000011 HC RX IP 250 OP 636: Performed by: NURSE ANESTHETIST, CERTIFIED REGISTERED

## 2022-07-25 PROCEDURE — 250N000009 HC RX 250: Performed by: ORTHOPAEDIC SURGERY

## 2022-07-25 PROCEDURE — 258N000003 HC RX IP 258 OP 636: Performed by: NURSE ANESTHETIST, CERTIFIED REGISTERED

## 2022-07-25 PROCEDURE — 99232 SBSQ HOSP IP/OBS MODERATE 35: CPT | Performed by: PHYSICIAN ASSISTANT

## 2022-07-25 PROCEDURE — 999N000141 HC STATISTIC PRE-PROCEDURE NURSING ASSESSMENT: Performed by: ORTHOPAEDIC SURGERY

## 2022-07-25 PROCEDURE — 0SRC0J9 REPLACEMENT OF RIGHT KNEE JOINT WITH SYNTHETIC SUBSTITUTE, CEMENTED, OPEN APPROACH: ICD-10-PCS | Performed by: ORTHOPAEDIC SURGERY

## 2022-07-25 PROCEDURE — 97530 THERAPEUTIC ACTIVITIES: CPT | Mod: GP

## 2022-07-25 PROCEDURE — 258N000001 HC RX 258: Performed by: ORTHOPAEDIC SURGERY

## 2022-07-25 PROCEDURE — 370N000017 HC ANESTHESIA TECHNICAL FEE, PER MIN: Performed by: ORTHOPAEDIC SURGERY

## 2022-07-25 PROCEDURE — 710N000009 HC RECOVERY PHASE 1, LEVEL 1, PER MIN: Performed by: ORTHOPAEDIC SURGERY

## 2022-07-25 PROCEDURE — 250N000011 HC RX IP 250 OP 636: Performed by: ANESTHESIOLOGY

## 2022-07-25 PROCEDURE — C1713 ANCHOR/SCREW BN/BN,TIS/BN: HCPCS | Performed by: ORTHOPAEDIC SURGERY

## 2022-07-25 PROCEDURE — C1776 JOINT DEVICE (IMPLANTABLE): HCPCS | Performed by: ORTHOPAEDIC SURGERY

## 2022-07-25 PROCEDURE — 272N000001 HC OR GENERAL SUPPLY STERILE: Performed by: ORTHOPAEDIC SURGERY

## 2022-07-25 PROCEDURE — 82310 ASSAY OF CALCIUM: CPT | Performed by: ORTHOPAEDIC SURGERY

## 2022-07-25 PROCEDURE — 258N000003 HC RX IP 258 OP 636: Performed by: ORTHOPAEDIC SURGERY

## 2022-07-25 PROCEDURE — 258N000003 HC RX IP 258 OP 636: Performed by: ANESTHESIOLOGY

## 2022-07-25 PROCEDURE — 84132 ASSAY OF SERUM POTASSIUM: CPT | Performed by: ANESTHESIOLOGY

## 2022-07-25 PROCEDURE — 36415 COLL VENOUS BLD VENIPUNCTURE: CPT | Performed by: ANESTHESIOLOGY

## 2022-07-25 PROCEDURE — 86850 RBC ANTIBODY SCREEN: CPT | Performed by: ORTHOPAEDIC SURGERY

## 2022-07-25 PROCEDURE — 97161 PT EVAL LOW COMPLEX 20 MIN: CPT | Mod: GP

## 2022-07-25 PROCEDURE — 250N000013 HC RX MED GY IP 250 OP 250 PS 637: Performed by: ORTHOPAEDIC SURGERY

## 2022-07-25 DEVICE — BONE CEMENT RADIOPAQUE SIMPLEX HV FULL DOSE 6194-1-001: Type: IMPLANTABLE DEVICE | Site: KNEE | Status: FUNCTIONAL

## 2022-07-25 DEVICE — IMPLANTABLE DEVICE
Type: IMPLANTABLE DEVICE | Site: KNEE | Status: FUNCTIONAL
Brand: VANGUARD® KNEE SYSTEM

## 2022-07-25 DEVICE — IMP COMP FEMORAL VANGARD BIOM FIXATION 183099: Type: IMPLANTABLE DEVICE | Site: KNEE | Status: FUNCTIONAL

## 2022-07-25 DEVICE — IMP COMP TIBIAL KNEE ASCENT 79MM 141225: Type: IMPLANTABLE DEVICE | Site: KNEE | Status: FUNCTIONAL

## 2022-07-25 DEVICE — IMP COMP PATELLA BIOM 3 PEG 37MM STD 184768: Type: IMPLANTABLE DEVICE | Site: KNEE | Status: FUNCTIONAL

## 2022-07-25 RX ORDER — FAMOTIDINE 20 MG/1
40 TABLET, FILM COATED ORAL 2 TIMES DAILY
Status: DISCONTINUED | OUTPATIENT
Start: 2022-07-25 | End: 2022-07-26

## 2022-07-25 RX ORDER — CEFAZOLIN SODIUM/WATER 2 G/20 ML
2 SYRINGE (ML) INTRAVENOUS
Status: COMPLETED | OUTPATIENT
Start: 2022-07-25 | End: 2022-07-25

## 2022-07-25 RX ORDER — ONDANSETRON 4 MG/1
4-8 TABLET, ORALLY DISINTEGRATING ORAL EVERY 8 HOURS PRN
Qty: 10 TABLET | Refills: 0 | Status: SHIPPED | OUTPATIENT
Start: 2022-07-25 | End: 2022-07-28

## 2022-07-25 RX ORDER — OXYCODONE HYDROCHLORIDE 5 MG/1
5 TABLET ORAL EVERY 4 HOURS PRN
Status: CANCELLED | OUTPATIENT
Start: 2022-07-25

## 2022-07-25 RX ORDER — LIDOCAINE 40 MG/G
CREAM TOPICAL
Status: DISCONTINUED | OUTPATIENT
Start: 2022-07-25 | End: 2022-07-27

## 2022-07-25 RX ORDER — BUPIVACAINE HYDROCHLORIDE 7.5 MG/ML
INJECTION, SOLUTION INTRASPINAL
Status: COMPLETED | OUTPATIENT
Start: 2022-07-25 | End: 2022-07-25

## 2022-07-25 RX ORDER — GABAPENTIN 300 MG/1
600 CAPSULE ORAL AT BEDTIME
Status: DISCONTINUED | OUTPATIENT
Start: 2022-07-25 | End: 2022-07-31 | Stop reason: HOSPADM

## 2022-07-25 RX ORDER — ONDANSETRON 2 MG/ML
4 INJECTION INTRAMUSCULAR; INTRAVENOUS EVERY 6 HOURS PRN
Status: DISCONTINUED | OUTPATIENT
Start: 2022-07-25 | End: 2022-07-31 | Stop reason: HOSPADM

## 2022-07-25 RX ORDER — TRAMADOL HYDROCHLORIDE 50 MG/1
50 TABLET ORAL EVERY 6 HOURS PRN
Status: DISCONTINUED | OUTPATIENT
Start: 2022-07-25 | End: 2022-07-26

## 2022-07-25 RX ORDER — AMOXICILLIN 250 MG
1-2 CAPSULE ORAL 2 TIMES DAILY
Qty: 30 TABLET | Refills: 0 | Status: SHIPPED | OUTPATIENT
Start: 2022-07-25 | End: 2022-07-28

## 2022-07-25 RX ORDER — SODIUM CHLORIDE, SODIUM LACTATE, POTASSIUM CHLORIDE, CALCIUM CHLORIDE 600; 310; 30; 20 MG/100ML; MG/100ML; MG/100ML; MG/100ML
INJECTION, SOLUTION INTRAVENOUS CONTINUOUS
Status: DISCONTINUED | OUTPATIENT
Start: 2022-07-25 | End: 2022-07-25

## 2022-07-25 RX ORDER — METOPROLOL TARTRATE 1 MG/ML
2.5 INJECTION, SOLUTION INTRAVENOUS EVERY 4 HOURS PRN
Status: DISCONTINUED | OUTPATIENT
Start: 2022-07-25 | End: 2022-07-31 | Stop reason: HOSPADM

## 2022-07-25 RX ORDER — POLYETHYLENE GLYCOL 3350 17 G/17G
17 POWDER, FOR SOLUTION ORAL DAILY
Status: DISCONTINUED | OUTPATIENT
Start: 2022-07-26 | End: 2022-07-31 | Stop reason: HOSPADM

## 2022-07-25 RX ORDER — ASPIRIN 81 MG/1
81 TABLET ORAL 2 TIMES DAILY
Status: DISCONTINUED | OUTPATIENT
Start: 2022-07-26 | End: 2022-07-25 | Stop reason: CLARIF

## 2022-07-25 RX ORDER — ALBUTEROL SULFATE 90 UG/1
1-2 AEROSOL, METERED RESPIRATORY (INHALATION) EVERY 6 HOURS PRN
Status: DISCONTINUED | OUTPATIENT
Start: 2022-07-25 | End: 2022-07-31 | Stop reason: HOSPADM

## 2022-07-25 RX ORDER — ONDANSETRON 2 MG/ML
4 INJECTION INTRAMUSCULAR; INTRAVENOUS EVERY 30 MIN PRN
Status: DISCONTINUED | OUTPATIENT
Start: 2022-07-25 | End: 2022-07-25 | Stop reason: HOSPADM

## 2022-07-25 RX ORDER — CEFAZOLIN SODIUM/WATER 2 G/20 ML
2 SYRINGE (ML) INTRAVENOUS SEE ADMIN INSTRUCTIONS
Status: DISCONTINUED | OUTPATIENT
Start: 2022-07-25 | End: 2022-07-26

## 2022-07-25 RX ORDER — SODIUM CHLORIDE, SODIUM LACTATE, POTASSIUM CHLORIDE, CALCIUM CHLORIDE 600; 310; 30; 20 MG/100ML; MG/100ML; MG/100ML; MG/100ML
INJECTION, SOLUTION INTRAVENOUS CONTINUOUS
Status: DISCONTINUED | OUTPATIENT
Start: 2022-07-25 | End: 2022-07-25 | Stop reason: HOSPADM

## 2022-07-25 RX ORDER — ONDANSETRON 2 MG/ML
INJECTION INTRAMUSCULAR; INTRAVENOUS PRN
Status: DISCONTINUED | OUTPATIENT
Start: 2022-07-25 | End: 2022-07-25

## 2022-07-25 RX ORDER — TRANEXAMIC ACID 10 MG/ML
1 INJECTION, SOLUTION INTRAVENOUS ONCE
Status: COMPLETED | OUTPATIENT
Start: 2022-07-25 | End: 2022-07-25

## 2022-07-25 RX ORDER — ALBUTEROL SULFATE 0.83 MG/ML
2.5 SOLUTION RESPIRATORY (INHALATION) EVERY 6 HOURS PRN
Status: DISCONTINUED | OUTPATIENT
Start: 2022-07-25 | End: 2022-07-31 | Stop reason: HOSPADM

## 2022-07-25 RX ORDER — HYDROXYZINE HYDROCHLORIDE 10 MG/1
10 TABLET, FILM COATED ORAL EVERY 6 HOURS PRN
Qty: 30 TABLET | Refills: 0 | Status: SHIPPED | OUTPATIENT
Start: 2022-07-25 | End: 2022-07-27

## 2022-07-25 RX ORDER — VANCOMYCIN HYDROCHLORIDE 1 G/20ML
INJECTION, POWDER, LYOPHILIZED, FOR SOLUTION INTRAVENOUS PRN
Status: DISCONTINUED | OUTPATIENT
Start: 2022-07-25 | End: 2022-07-25 | Stop reason: HOSPADM

## 2022-07-25 RX ORDER — FENTANYL CITRATE 50 UG/ML
50 INJECTION, SOLUTION INTRAMUSCULAR; INTRAVENOUS
Status: DISCONTINUED | OUTPATIENT
Start: 2022-07-25 | End: 2022-07-26

## 2022-07-25 RX ORDER — BISACODYL 10 MG
10 SUPPOSITORY, RECTAL RECTAL DAILY PRN
Status: DISCONTINUED | OUTPATIENT
Start: 2022-07-25 | End: 2022-07-31 | Stop reason: HOSPADM

## 2022-07-25 RX ORDER — HYDRALAZINE HYDROCHLORIDE 20 MG/ML
10 INJECTION INTRAMUSCULAR; INTRAVENOUS EVERY 4 HOURS PRN
Status: DISCONTINUED | OUTPATIENT
Start: 2022-07-25 | End: 2022-07-31 | Stop reason: HOSPADM

## 2022-07-25 RX ORDER — ONDANSETRON 4 MG/1
4 TABLET, ORALLY DISINTEGRATING ORAL EVERY 6 HOURS PRN
Status: DISCONTINUED | OUTPATIENT
Start: 2022-07-25 | End: 2022-07-31 | Stop reason: HOSPADM

## 2022-07-25 RX ORDER — FERROUS SULFATE 325(65) MG
325 TABLET ORAL 2 TIMES DAILY
Status: DISCONTINUED | OUTPATIENT
Start: 2022-07-25 | End: 2022-07-31 | Stop reason: HOSPADM

## 2022-07-25 RX ORDER — DIMENHYDRINATE 50 MG
50 TABLET ORAL
Status: DISCONTINUED | OUTPATIENT
Start: 2022-07-25 | End: 2022-07-28

## 2022-07-25 RX ORDER — AMOXICILLIN 250 MG
1 CAPSULE ORAL 2 TIMES DAILY
Status: DISCONTINUED | OUTPATIENT
Start: 2022-07-25 | End: 2022-07-31 | Stop reason: HOSPADM

## 2022-07-25 RX ORDER — PROCHLORPERAZINE MALEATE 5 MG
5 TABLET ORAL EVERY 6 HOURS PRN
Status: DISCONTINUED | OUTPATIENT
Start: 2022-07-25 | End: 2022-07-31 | Stop reason: HOSPADM

## 2022-07-25 RX ORDER — TRANEXAMIC ACID 10 MG/ML
1 INJECTION, SOLUTION INTRAVENOUS ONCE
Status: DISCONTINUED | OUTPATIENT
Start: 2022-07-25 | End: 2022-07-26

## 2022-07-25 RX ORDER — NALOXONE HYDROCHLORIDE 0.4 MG/ML
0.4 INJECTION, SOLUTION INTRAMUSCULAR; INTRAVENOUS; SUBCUTANEOUS
Status: DISCONTINUED | OUTPATIENT
Start: 2022-07-25 | End: 2022-07-31 | Stop reason: HOSPADM

## 2022-07-25 RX ORDER — ACETAMINOPHEN 325 MG/1
650 TABLET ORAL EVERY 4 HOURS PRN
Status: DISCONTINUED | OUTPATIENT
Start: 2022-07-28 | End: 2022-07-31 | Stop reason: HOSPADM

## 2022-07-25 RX ORDER — MAGNESIUM HYDROXIDE 1200 MG/15ML
LIQUID ORAL PRN
Status: DISCONTINUED | OUTPATIENT
Start: 2022-07-25 | End: 2022-07-25 | Stop reason: HOSPADM

## 2022-07-25 RX ORDER — HYDROXYZINE HYDROCHLORIDE 10 MG/1
10 TABLET, FILM COATED ORAL EVERY 6 HOURS PRN
Status: DISCONTINUED | OUTPATIENT
Start: 2022-07-25 | End: 2022-07-26

## 2022-07-25 RX ORDER — HYDROMORPHONE HCL IN WATER/PF 6 MG/30 ML
0.2 PATIENT CONTROLLED ANALGESIA SYRINGE INTRAVENOUS EVERY 5 MIN PRN
Status: DISCONTINUED | OUTPATIENT
Start: 2022-07-25 | End: 2022-07-25 | Stop reason: HOSPADM

## 2022-07-25 RX ORDER — CEFAZOLIN SODIUM 1 G/3ML
1 INJECTION, POWDER, FOR SOLUTION INTRAMUSCULAR; INTRAVENOUS EVERY 8 HOURS
Status: COMPLETED | OUTPATIENT
Start: 2022-07-25 | End: 2022-07-26

## 2022-07-25 RX ORDER — FENTANYL CITRATE 50 UG/ML
25 INJECTION, SOLUTION INTRAMUSCULAR; INTRAVENOUS EVERY 5 MIN PRN
Status: DISCONTINUED | OUTPATIENT
Start: 2022-07-25 | End: 2022-07-25 | Stop reason: HOSPADM

## 2022-07-25 RX ORDER — TRAMADOL HYDROCHLORIDE 50 MG/1
50 TABLET ORAL EVERY 6 HOURS PRN
Qty: 30 TABLET | Refills: 0 | Status: SHIPPED | OUTPATIENT
Start: 2022-07-25 | End: 2022-07-26

## 2022-07-25 RX ORDER — ASPIRIN 81 MG/1
81 TABLET ORAL 2 TIMES DAILY
Qty: 60 TABLET | Refills: 0 | Status: SHIPPED | OUTPATIENT
Start: 2022-07-25 | End: 2022-07-27

## 2022-07-25 RX ORDER — NALOXONE HYDROCHLORIDE 0.4 MG/ML
0.2 INJECTION, SOLUTION INTRAMUSCULAR; INTRAVENOUS; SUBCUTANEOUS
Status: DISCONTINUED | OUTPATIENT
Start: 2022-07-25 | End: 2022-07-31 | Stop reason: HOSPADM

## 2022-07-25 RX ORDER — ACETAMINOPHEN 500 MG
1000 TABLET ORAL 3 TIMES DAILY
Status: DISCONTINUED | OUTPATIENT
Start: 2022-07-25 | End: 2022-07-25

## 2022-07-25 RX ORDER — DIPHENHYDRAMINE HCL 12.5MG/5ML
12.5 LIQUID (ML) ORAL EVERY 6 HOURS PRN
Status: DISCONTINUED | OUTPATIENT
Start: 2022-07-25 | End: 2022-07-26

## 2022-07-25 RX ORDER — ACETAMINOPHEN 325 MG/1
975 TABLET ORAL EVERY 8 HOURS
Status: DISPENSED | OUTPATIENT
Start: 2022-07-25 | End: 2022-07-28

## 2022-07-25 RX ORDER — ATORVASTATIN CALCIUM 10 MG/1
10 TABLET, FILM COATED ORAL DAILY
Status: DISCONTINUED | OUTPATIENT
Start: 2022-07-26 | End: 2022-07-31 | Stop reason: HOSPADM

## 2022-07-25 RX ORDER — SODIUM CHLORIDE 9 MG/ML
INJECTION, SOLUTION INTRAVENOUS CONTINUOUS
Status: DISCONTINUED | OUTPATIENT
Start: 2022-07-25 | End: 2022-07-28

## 2022-07-25 RX ORDER — ONDANSETRON 4 MG/1
4 TABLET, ORALLY DISINTEGRATING ORAL EVERY 30 MIN PRN
Status: DISCONTINUED | OUTPATIENT
Start: 2022-07-25 | End: 2022-07-25 | Stop reason: HOSPADM

## 2022-07-25 RX ORDER — POLYETHYLENE GLYCOL 3350 17 G/17G
1 POWDER, FOR SOLUTION ORAL DAILY
Qty: 7 PACKET | Refills: 0 | Status: SHIPPED | OUTPATIENT
Start: 2022-07-25 | End: 2022-10-09

## 2022-07-25 RX ORDER — HYDROXYZINE PAMOATE 25 MG/1
50 CAPSULE ORAL DAILY
Status: DISCONTINUED | OUTPATIENT
Start: 2022-07-25 | End: 2022-07-28

## 2022-07-25 RX ORDER — DULOXETIN HYDROCHLORIDE 60 MG/1
60 CAPSULE, DELAYED RELEASE ORAL DAILY
Status: DISCONTINUED | OUTPATIENT
Start: 2022-07-26 | End: 2022-07-31 | Stop reason: HOSPADM

## 2022-07-25 RX ORDER — ACETAMINOPHEN 325 MG/1
650 TABLET ORAL EVERY 4 HOURS PRN
Qty: 100 TABLET | Refills: 0 | Status: SHIPPED | OUTPATIENT
Start: 2022-07-25 | End: 2022-08-02

## 2022-07-25 RX ADMIN — SENNOSIDES AND DOCUSATE SODIUM 1 TABLET: 50; 8.6 TABLET ORAL at 21:36

## 2022-07-25 RX ADMIN — TRANEXAMIC ACID 1 G: 10 INJECTION, SOLUTION INTRAVENOUS at 10:30

## 2022-07-25 RX ADMIN — MIDAZOLAM HYDROCHLORIDE 0.5 MG: 1 INJECTION, SOLUTION INTRAMUSCULAR; INTRAVENOUS at 09:24

## 2022-07-25 RX ADMIN — ACETAMINOPHEN 975 MG: 325 TABLET ORAL at 16:44

## 2022-07-25 RX ADMIN — PHENYLEPHRINE HYDROCHLORIDE 0.2 MCG/KG/MIN: 10 INJECTION INTRAVENOUS at 10:55

## 2022-07-25 RX ADMIN — GABAPENTIN 600 MG: 300 CAPSULE ORAL at 21:36

## 2022-07-25 RX ADMIN — SODIUM CHLORIDE, POTASSIUM CHLORIDE, SODIUM LACTATE AND CALCIUM CHLORIDE: 600; 310; 30; 20 INJECTION, SOLUTION INTRAVENOUS at 09:10

## 2022-07-25 RX ADMIN — FAMOTIDINE 40 MG: 20 TABLET ORAL at 21:36

## 2022-07-25 RX ADMIN — HYDROXYZINE PAMOATE 50 MG: 25 CAPSULE ORAL at 18:27

## 2022-07-25 RX ADMIN — TRANEXAMIC ACID 1 G: 10 INJECTION, SOLUTION INTRAVENOUS at 11:56

## 2022-07-25 RX ADMIN — CEFAZOLIN 1 G: 1 INJECTION, POWDER, FOR SOLUTION INTRAMUSCULAR; INTRAVENOUS at 18:31

## 2022-07-25 RX ADMIN — FLUTICASONE FUROATE AND VILANTEROL TRIFENATATE 1 PUFF: 200; 25 POWDER RESPIRATORY (INHALATION) at 18:29

## 2022-07-25 RX ADMIN — SODIUM CHLORIDE: 9 INJECTION, SOLUTION INTRAVENOUS at 15:53

## 2022-07-25 RX ADMIN — TRAMADOL HYDROCHLORIDE 50 MG: 50 TABLET, COATED ORAL at 20:08

## 2022-07-25 RX ADMIN — Medication 2 G: at 10:30

## 2022-07-25 RX ADMIN — BUPIVACAINE HYDROCHLORIDE IN DEXTROSE 1.6 ML: 7.5 INJECTION, SOLUTION SUBARACHNOID at 10:33

## 2022-07-25 RX ADMIN — ONDANSETRON 4 MG: 2 INJECTION INTRAMUSCULAR; INTRAVENOUS at 12:03

## 2022-07-25 RX ADMIN — FERROUS SULFATE TAB 325 MG (65 MG ELEMENTAL FE) 325 MG: 325 (65 FE) TAB at 21:36

## 2022-07-25 RX ADMIN — PHENYLEPHRINE HYDROCHLORIDE 50 MCG: 10 INJECTION INTRAVENOUS at 10:55

## 2022-07-25 RX ADMIN — PHENYLEPHRINE HYDROCHLORIDE 50 MCG: 10 INJECTION INTRAVENOUS at 10:50

## 2022-07-25 ASSESSMENT — ACTIVITIES OF DAILY LIVING (ADL)
ADLS_ACUITY_SCORE: 26
ADLS_ACUITY_SCORE: 26
ADLS_ACUITY_SCORE: 28
ADLS_ACUITY_SCORE: 26
ADLS_ACUITY_SCORE: 26

## 2022-07-25 ASSESSMENT — LIFESTYLE VARIABLES: TOBACCO_USE: 1

## 2022-07-25 ASSESSMENT — ENCOUNTER SYMPTOMS: DYSRHYTHMIAS: 1

## 2022-07-25 ASSESSMENT — COPD QUESTIONNAIRES: COPD: 1

## 2022-07-25 NOTE — ANESTHESIA PROCEDURE NOTES
Intrathecal Procedure Note    Pre-Procedure   Staff -        Anesthesiologist:  Michael Devlin MD       Performed By: anesthesiologist       Location: OR       Pre-Anesthestic Checklist: patient identified, IV checked, site marked, risks and benefits discussed, informed consent, monitors and equipment checked, pre-op evaluation, at physician/surgeon's request and post-op pain management  Timeout:       Correct Patient: Yes        Correct Procedure: Yes        Correct Site: Yes        Correct Position: Yes   Procedure Documentation  Procedure: intrathecal       Patient Position: sitting       Patient Prep/Sterile Barriers: sterile gloves, mask, patient draped       Skin prep: Chloraprep       Insertion Site: L3-4. (midline approach).       Spinal Needle Type: Farnco tip       Introducer used       Introducer: 20 G       # of attempts: 1 and  # of redirects:  0    Assessment/Narrative         Paresthesias: No.       CSF fluid: clear.    Medication(s) Administered   0.75% Hyperbaric Bupivacaine (Intrathecal) - Intrathecal   1.6 mL - 7/25/2022 10:33:00 AM   Comments:  Patient tolerated well.  Pre and post aspiration.  No complications.  0.75% Bupivacaine and Duramorph documented in record.

## 2022-07-25 NOTE — ANESTHESIA PREPROCEDURE EVALUATION
Anesthesia Pre-Procedure Evaluation    Patient: Hermilo Velasquez   MRN: [id number] : 11/3/1932        Procedure : Procedure(s):  RIGHT TOTAL KNEE ARTHROPLASTY          Past Medical History:   Diagnosis Date     Adenocarcinoma, lung, right (H) 2019    S/P RLL Wedge resection with Dr. Vasques      Aortic stenosis     Severe AS, 2015 AVR - [facility] TRIFECTA Bovine bioprosthesis 25MM TF-25A     Atrial fibrillation (H)     2015 Paroxysmal post op Afib - discharged on Warfarin and a beta blocker     COPD (chronic obstructive pulmonary disease) (H)      Deep vein thrombosis (H)      GERD (gastroesophageal reflux disease)      Heart murmur      Monoclonal gammopathy     plasmacyte prominent causing monoclonal gammopathy     Need for SBE (subacute bacterial endocarditis) prophylaxis      Neuropathy      Other and unspecified hyperlipidemia      Other malignant lymphomas     non hodgkin's lymphoma     RBBB (right bundle branch block)      Severe sepsis with acute organ dysfunction (H) 2015     Unspecified hereditary and idiopathic peripheral neuropathy       Past Surgical History:   Procedure Laterality Date     APPENDECTOMY       AS TOTAL KNEE ARTHROPLASTY       BACK SURGERY       ESOPHAGOSCOPY, GASTROSCOPY, DUODENOSCOPY (EGD), COMBINED N/A 2015    Procedure: COMBINED ESOPHAGOSCOPY, GASTROSCOPY, DUODENOSCOPY (EGD);  Surgeon: Danis Castillo MD;  Location:  GI     ESOPHAGOSCOPY, GASTROSCOPY, DUODENOSCOPY (EGD), COMBINED N/A 2018    Procedure: COMBINED ESOPHAGOSCOPY, GASTROSCOPY, DUODENOSCOPY (EGD);;  Surgeon: Toby Dong DO;  Location:  GI     ESOPHAGOSCOPY, GASTROSCOPY, DUODENOSCOPY (EGD), COMBINED N/A 2020    Procedure: ESOPHAGOGASTRODUODENOSCOPY (EGD);  Surgeon: Herrera Henry MD;  Location:  GI     ESOPHAGOSCOPY, GASTROSCOPY, DUODENOSCOPY (EGD), COMBINED N/A 10/24/2020    Procedure: ESOPHAGOGASTRODUODENOSCOPY (EGD);  Surgeon: Vick Florentino MD;  Location:  " GI     ESOPHAGOSCOPY, GASTROSCOPY, DUODENOSCOPY (EGD), COMBINED N/A 2022    Procedure: ESOPHAGOGASTRODUODENOSCOPY (EGD);  Surgeon: Vick Florentino MD;  Location:  GI     HERNIA REPAIR  2006     HERNIORRHAPHY VENTRAL  2013    Procedure: HERNIORRHAPHY VENTRAL;  VENTRAL HERNIA REPAIR WITH MESH;  Surgeon: Patel Guzman MD;  Location:  OR     KNEE SURGERY      arthroscopic right knee surgery      LOBECTOMY LUNG Right 3/26/2019    POSSIBLE LOBECTOMY LUNG per Dr. Vasques at [facility]     REPAIR LIGAMENT ANKLE  2012    Procedure:REPAIR LIGAMENT ANKLE; LEFT TARSAL TUNNEL RELEASE OF KNOT OF ARIEL RELEASE; Surgeon:SAUL PUENTE; Location: OR     REPLACE VALVE AORTIC N/A 9/3/2015    Procedure: REPLACE VALVE AORTIC;  Surgeon: Antonino Mitchell MD;  Location:  OR     ROTATOR CUFF REPAIR RT/LT      bilateral     SPINE SURGERY      3 spine surgeries     THORACOTOMY, WEDGE RESECTION LUNG, COMBINED Right 3/26/2019    RIGHT THORACOTOMY, WEDGE RESECTION, RIGHT LOWER LOBE LUNG NODULE,;  Surgeon: Jose A Vasques MD;  Location:  OR     TONSILLECTOMY       TURP        Allergies   Allergen Reactions     Lidocaine Blisters     Allergy to lidocaine ointment     Omeprazole Itching     Pantoprazole Itching     Prevacid [Lansoprazole] Itching     Lasix [Furosemide] Rash     Penicillins Rash     \"broke out from injection\" 60 yrs ago  Tolerates cephalosporins      Social History     Tobacco Use     Smoking status: Former Smoker     Packs/day: 2.00     Years: 55.00     Pack years: 110.00     Quit date: 1998     Years since quittin.5     Smokeless tobacco: Never Used   Substance Use Topics     Alcohol use: Yes     Alcohol/week: 0.0 standard drinks     Comment: 1 drink per day      Wt Readings from Last 1 Encounters:   22 70.8 kg (156 lb)        Anesthesia Evaluation            ROS/MED HX  ENT/Pulmonary:     (+) tobacco use, Past use, COPD, O2 dependent, during " Nighttime, 3 liters/min,     Neurologic:       Cardiovascular:     (+) Dyslipidemia hypertension-----dysrhythmias, a-fib, valvular problems/murmurs type: AS AVR 2015. Previous cardiac testing   Echo: Date: 2021 Results:  Interpretation Summary     1. Left ventricular systolic function is normal. The visual ejection fraction  is estimated at 55-60%.  2. No regional wall motion abnormalities noted.  3. The right ventricle is normal in structure, function and size.  4. The left atrium is moderately dilated.  5. 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 The valve is poorly visualized.  There is increased gradients across the valve consistent with mild to moderate  prosthetic valve stenosis; mean gradient 25 mmHg, Peak velocity 3.3 m/sec,  calculated valve area 1.4 cm2.  7. In comparison with the prior study, the gradients across the bioprosthetic  aortic valve have increased (previously 12 mmHg).    Stress Test: Date: Results:    ECG Reviewed: Date: Results:    Cath: Date: Results:   (-) CAD, CHF and stent   METS/Exercise Tolerance:     Hematologic:       Musculoskeletal:   (+) arthritis,     GI/Hepatic:     (+) GERD,     Renal/Genitourinary:     (+) renal disease, type: CRI, Pt does not require dialysis,     Endo:       Psychiatric/Substance Use:       Infectious Disease:       Malignancy:   (+) Malignancy, History of Lung and Lymphoma/Leukemia.    Other: Comment: Bullous Pemphigoid           Physical Exam    Airway        Mallampati: III   TM distance: > 3 FB   Neck ROM: full   Mouth opening: > 3 cm    Respiratory Devices and Support         Dental       (+) upper dentures    B=Bridge, C=Chipped, L=Loose, M=Missing    Cardiovascular   cardiovascular exam normal       Rhythm and rate: regular     Pulmonary           (+) decreased breath sounds           OUTSIDE LABS:  CBC:   Lab Results   Component Value Date    WBC 10.9 06/27/2022    WBC 8.0 06/08/2022    HGB 12.1 (L) 06/27/2022    HGB 10.3 (L) 06/08/2022    HCT 39.4 (L)  06/27/2022    HCT 33.6 (L) 06/08/2022     06/27/2022     06/08/2022     BMP:   Lab Results   Component Value Date     05/09/2022     05/08/2022    POTASSIUM 3.6 05/09/2022    POTASSIUM 4.3 05/08/2022    CHLORIDE 105 05/19/2022    CHLORIDE 110 (H) 05/09/2022    CO2 18 (L) 05/09/2022    CO2 26 05/08/2022    BUN 23 05/09/2022    BUN 24 05/08/2022    CR 0.92 05/09/2022    CR 1.13 05/08/2022    GLC 92 05/09/2022     (H) 05/08/2022     COAGS:   Lab Results   Component Value Date    PTT 36 10/23/2020    INR 1.99 (H) 05/08/2022    FIBR 252 09/03/2015     POC:   Lab Results   Component Value Date     (H) 09/15/2020     HEPATIC:   Lab Results   Component Value Date    ALBUMIN 2.2 (L) 04/11/2022    PROTTOTAL 5.1 (L) 04/11/2022    ALT 10 04/11/2022    AST 17 04/11/2022    ALKPHOS 78 04/11/2022    BILITOTAL 0.3 04/11/2022     OTHER:   Lab Results   Component Value Date    PH 7.36 04/10/2022    LACT 1.1 04/10/2022    A1C 5.8 (H) 07/31/2018    AMRITA 8.6 05/09/2022    PHOS 3.9 07/23/2018    MAG 2.1 07/23/2018    LIPASE 54 (L) 03/22/2020    TSH 2.46 09/08/2021    T4 1.21 02/21/2018    .0 (H) 05/16/2019    SED 89 (H) 05/16/2019       Anesthesia Plan    ASA Status:  3   NPO Status:  NPO Appropriate    Anesthesia Type: Spinal.   Induction: Intravenous, Propofol.           Consents    Anesthesia Plan(s) and associated risks, benefits, and realistic alternatives discussed. Questions answered and patient/representative(s) expressed understanding.    - Discussed:     - Discussed with:  Patient      - Extended Intubation/Ventilatory Support Discussed: No.      - Patient is DNR/DNI Status: No    Use of blood products discussed: Yes.     - Discussed with: Patient.     - Consented: consented to blood products            Reason for refusal: other.     Postoperative Care    Pain management: Multi-modal analgesia.   PONV prophylaxis: Ondansetron (or other 5HT-3), Dexamethasone or Solumedrol      Comments:    Other Comments: Patient is counseled on the anesthesia plan and relevant anesthesia procedures including all risks and benefits. All patient questions were answered.     Patient has been off Eliquis for 65 hours. Recommendations are 72 hours. Will proceed with SAB as patient has poor respiratory function and Home O2.             Michael Devlin MD

## 2022-07-25 NOTE — ANESTHESIA CARE TRANSFER NOTE
Patient: Hermilo Velasquez    Procedure: Procedure(s):  RIGHT TOTAL KNEE ARTHROPLASTY       Diagnosis: Osteoarthritis of right knee [M17.11]  Diagnosis Additional Information: No value filed.    Anesthesia Type:   Spinal     Note:    Oropharynx: oropharynx clear of all foreign objects and spontaneously breathing  Level of Consciousness: awake  Oxygen Supplementation: face mask  Level of Supplemental Oxygen (L/min / FiO2): 8  Independent Airway: airway patency satisfactory and stable  Dentition: dentition unchanged  Vital Signs Stable: post-procedure vital signs reviewed and stable  Report to RN Given: handoff report given  Patient transferred to: PACU  Comments: Pt awake and able to verbalize needs. ALL monitors on and audible. Spontaneous respiration without difficulty. o2 sats 100%. Pt denies pain. Report given to PACU RN.  Handoff Report: Identifed the Patient, Identified the Reponsible Provider, Reviewed the pertinent medical history, Discussed the surgical course, Reviewed Intra-OP anesthesia mangement and issues during anesthesia, Set expectations for post-procedure period and Allowed opportunity for questions and acknowledgement of understanding      Vitals:  Vitals Value Taken Time   BP     Temp     Pulse 68 07/25/22 1235   Resp 27 07/25/22 1235   SpO2 100 % 07/25/22 1235   Vitals shown include unvalidated device data.    Electronically Signed By: ILYA Frazier CRNA  July 25, 2022  12:36 PM

## 2022-07-25 NOTE — ANESTHESIA PROCEDURE NOTES
Adductor canal Procedure Note    Pre-Procedure   Staff -        Anesthesiologist:  Michael Devlin MD       Performed By: anesthesiologist       Location: pre-op       Pre-Anesthestic Checklist: patient identified, IV checked, site marked, risks and benefits discussed, informed consent, monitors and equipment checked, pre-op evaluation, at physician/surgeon's request and post-op pain management  Timeout:       Correct Patient: Yes        Correct Procedure: Yes        Correct Site: Yes        Correct Position: Yes        Correct Laterality: Yes        Site Marked: Yes  Procedure Documentation  Procedure: Adductor canal       Laterality: right       Patient Position: supine       Patient Prep/Sterile Barriers: mask       Skin prep: Chloraprep       Needle Type: short bevel and insulated       Needle Gauge: 21.        Needle Length (millimeters): 80        Ultrasound guided       1. Ultrasound was used to identify targeted nerve, plexus, vascular marker, or fascial plane and place a needle adjacent to it in real-time.       2. Ultrasound was used to visualize the spread of anesthetic in close proximity to the above referenced structure.       3. A permanent image is entered into the patient's record.    Assessment/Narrative         The placement was negative for: blood aspirated, painful injection and site bleeding       Paresthesias: No.       Bolus given via needle..        Secured via.        Insertion/Infusion Method: Single Shot       Complications: none     Comments:  15 cc of 0.5% Bupivacaine with epinephrine (1:400K) injected on the anterior side of the femoral artery, in close proximity to the femoral nerve branches via the adductor canal approach.      Ultrasound Interpretation, Peripheral Nerve Block    1. Under ultrasound guidance, the needle was inserted and placed in close proximity to the target nerve(s).  2. Ultrasound was also used to visualize the spread of the anesthetic in close proximity to  the nerve(s) being blocked.  Local anesthetic was administered in incremental doses, with intermittent negative aspiration.    3. The nerve(s) appeared anatomically normal.  4. There were no apparent abnormal pathological findings.  5. A permanent ultrasound image was saved in the patient's record.    Pt tolerated well.    No complications.      The surgeon has given a verbal order transferring care of this patient to me for the performance of a regional analgesia block for post-op pain control. It is requested of me because I am uniquely trained and qualified to perform this block and the surgeon is neither trained nor qualified to perform this procedure.

## 2022-07-25 NOTE — PROGRESS NOTES
Wife went home. Would like Dr. Deshpande to call her on home phone 1st, then try cellphone. She will wait for directions from staff about when to return and what room to go to. She knows she will go to [address] and will park in [address] parking ramp.

## 2022-07-25 NOTE — PROVIDER NOTIFICATION
MD Deshpande has seen bilateral skin tears on elbows from a fall on cement. OK to proceed with surgery.

## 2022-07-25 NOTE — CONSULTS
[facility]  Consult Note - Hospitalist Service     Date of Admission:  7/25/2022  Consult Requested by: Dr. Deshpande of orthopedic surgery  Reason for Consult: postoperative medical management  PRIMARY CARE PROVIDER:    Karson Bishop    Assessment & Plan   Hermilo Velasquez is a very pleasant 89 year old male with a past medical history of atrial fibrillation, DVT, COPD, non-Hodgkin's lymphoma, lung cancer status post wedge resection, peripheral neuropathy, GERD, and aortic stenosis status post valve replacement, amongst other conditions who underwent a scheduled right total knee arthroplasty on 7/25/2022. The hospitalist service was consulted for postoperative medical comanagement.    Status post right total knee arthroplasty 7/25/2022  Frequent falls  Admitted in May 2022 with right knee pain s/p fall, with right knee effusion and hemarthrosis in setting of DOAC. Surgery with Dr. Deshpande. No immediate postop complications. Spinal anesthesia. Unclear EBL.  -- Defer routine postoperative cares orthopedic surgery  -- PT/OT as per primary service  -- Aggressive pulmonary toilet,   -- Started on ASA 81mg BID + Eliquis 5mg daily by Ortho. Asked RN to clarify. ASA is not a home medication, likely could do Eliquis only at 5mg BID. Especially in setting of anemia.  -- Repeat BMP and Hgb in AM  -- Likely will need TCU on discharge    Chronic iron deficiency anemia  Hx PATIENCE and occult GI bleeding. Hgb as low as 6.8 on 4/11/22 requiring transfusion. REcent EGD with erosive gastropathy but no stigmata of bleed. Colonoscopy showed diverticulosis and multiple AVM but no source of bleed, they were treated. Since then he has been primarily 9-10 range. Received Iron infusions. Most recent Hgb 12.1 6/27.  -- Add on Hgb level  -- Daily Hgb  -- Recommend discontinuing ASA 81mg BID due to anemia and already on DOAC    Report of skin tears s/p recent fall  - WOCN to evaluate skin tears    Chronic atrial  fibrillation on chronic anticoagulation  History of DVT  History of recurrent PE, most recently 5/2021   Anticoagulated with Eliquis. Has been on low dose Toprol XL, taken off med list, however I do not seen in any notes that this was to be discontinued.  Hx DVT 10 years ago per patient.  -- Telemetry  -- Previously on BB, asked Pharmacist to double check regarding Toprol XL dosing and if still taking, I do not see when he would have been taken off of this medication  -- Eliquis 5mg BID, resume as soon as able per Orthopedic Surgeon   -- Resume statin  -- PRN IV metoprolol for sustained heart rate >120  -- Monitor K+/Mg++, replace PRN    ADDENDUM 5779  Unclear dosing from patient and wife. All recent doses say Toprol XL 12.5mg/d and also ordered this dose May admission. [facility] She thought maybe he was on 25mg/d dosing but unclear, is to update pharmacist when she looks at bottles at home.  - Order Toprol XL 12.5mg/d   - ASA discontinued by Ortho, agree  - Eliquis 5mg/d ordered by Ortho, they want loser dose due to bleeding risk. I would suggest 2.5mg BID if want lower dose for now rather than 5mg/d since the half life of Eliquis is 12 hours. On 5mg once daily he will be fully anticoagulated for 1/2 day rather than reduced dose for 24 hours per day.    History of lung cancer status post wedge resection 2019  COPD  -- Continue PTA L albuterol as needed, Breo Ellipta as needed  -- Monitor pulse oximetry/capnography during this hospitalization    Chronic kidney disease, stage 3a  Baseline Cr 1.1-1.3. On admission, 1.33.  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   -- Patient will also benefit from getting neurocognitive evaluation in an outpatient  setting if not already done.    Aortic stenosis s/p TAVR with bioprosthetic valve 2015  Coronary angiogram at that time was negative for obstructive CAD. TTE 4/2021 showed preserved LVEF with mild to moderate prosthetic valve stenosis.     Stage 4 Non-Hodgkins Lymphoma and MGUS, in remission  Followed by Dr. Zurita of [facility]. His lymphoma was diagnosed and treated with systemic chemotherapy in 2012 and reportedly has been in remission since. His MGUS is being monitored as an outpatient.    Chronic stable diagnoses and other pertinent medical history: Appropriate PTA medications will be resumed  GERD: Continue PTA Pepcid  Chronic peripheral neuropathy: Continue PTA gabapentin 600 mg at bedtime  Dyslipidemia  Bullous pemphigoid and chronic pruritus  AAA      Diet: Regular Diet Adult  Low Saturated Fat Na <2400 mg     DVT Prophylaxis: DOAC  Suazo Catheter: Not present  Code Status: Full Code     Disposition Plan  per orthopedics    Entered: Maria C Vo PA-C 07/25/2022, 4:26 PM        The patient has been discussed with Dr. Fajardo, who agrees with the assessment and plan at this time.      The hospitalist service would like to thank Dr. Deshpande for the consult.  We will continue to follow the patient.        Maria C Vo PA-C  [employer]    ______________________________________________________________________       History of Present Illness    History is obtained from the patient and EMR.     Hermilo Velasquez is a very pleasant 89 year old male with a past medical history of atrial fibrillation, DVT, COPD, non-Hodgkin's lymphoma, peripheral neuropathy, GERD, and aortic stenosis status post valve replacement, amongst other conditions who underwent a scheduled right total knee arthroplasty on 7/25/2022.  The hospitalist service was consulted for postoperative medical comanagement.    Right total knee arthroplasty was performed under spinal anesthesia.  No immediate  complications reported.  EBL not documented. Spinal anesthesia.  Postoperative vital signs reviewed. Patient denies any fever, chills, headache, lightheadedness, flulike symptoms, chest pain, shortness of breath, abdominal pain, nausea, vomiting, diarrhea, dysuria, focal weakness. Pain controlled postoperatively.    Review of Systems   The 10 point Review of Systems is negative other than noted in the HPI.    Past Medical History    I have reviewed this patient's medical history and updated it with pertinent information if needed.   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 Vasques      Aortic stenosis     Severe AS, 9/2015 AVR - [facility] TRIFECTA Bovine bioprosthesis 25MM TF-25A     Atrial fibrillation (H)     9/2015 Paroxysmal post op Afib - discharged on Warfarin and a beta blocker     COPD (chronic obstructive pulmonary disease) (H)      Deep vein thrombosis (H)      GERD (gastroesophageal reflux disease)      Heart murmur      Monoclonal gammopathy     plasmacyte prominent causing monoclonal gammopathy     Need for SBE (subacute bacterial endocarditis) prophylaxis      Neuropathy      Other and unspecified hyperlipidemia      Other malignant lymphomas     non hodgkin's lymphoma     RBBB (right bundle branch block)      Severe sepsis with acute organ dysfunction (H) 11/16/2015     Unspecified hereditary and idiopathic peripheral neuropathy        Past Surgical History   I have reviewed this patient's surgical history and updated it with pertinent information if needed.  Past Surgical History:   Procedure Laterality Date     APPENDECTOMY       AS TOTAL KNEE ARTHROPLASTY       BACK SURGERY       ESOPHAGOSCOPY, GASTROSCOPY, DUODENOSCOPY (EGD), COMBINED N/A 11/28/2015    Procedure: COMBINED ESOPHAGOSCOPY, GASTROSCOPY, DUODENOSCOPY (EGD);  Surgeon: Danis Castillo MD;  Location: [facility]     ESOPHAGOSCOPY, GASTROSCOPY, DUODENOSCOPY (EGD),  COMBINED N/A 2018    Procedure: COMBINED ESOPHAGOSCOPY, GASTROSCOPY, DUODENOSCOPY (EGD);;  Surgeon: Toby Dong DO;  Location:  GI     ESOPHAGOSCOPY, GASTROSCOPY, DUODENOSCOPY (EGD), COMBINED N/A 2020    Procedure: ESOPHAGOGASTRODUODENOSCOPY (EGD);  Surgeon: Herrera Henry MD;  Location:  GI     ESOPHAGOSCOPY, GASTROSCOPY, DUODENOSCOPY (EGD), COMBINED N/A 10/24/2020    Procedure: ESOPHAGOGASTRODUODENOSCOPY (EGD);  Surgeon: Vick Florentino MD;  Location:  GI     ESOPHAGOSCOPY, GASTROSCOPY, DUODENOSCOPY (EGD), COMBINED N/A 2022    Procedure: ESOPHAGOGASTRODUODENOSCOPY (EGD);  Surgeon: Vick Florentino MD;  Location:  GI     HERNIA REPAIR  2006     HERNIORRHAPHY VENTRAL  2013    Procedure: HERNIORRHAPHY VENTRAL;  VENTRAL HERNIA REPAIR WITH MESH;  Surgeon: Patel Guzman MD;  Location:  OR     KNEE SURGERY      arthroscopic right knee surgery      LOBECTOMY LUNG Right 3/26/2019    POSSIBLE LOBECTOMY LUNG per Dr. Vasques at [facility]     REPAIR LIGAMENT ANKLE  2012    Procedure:REPAIR LIGAMENT ANKLE; LEFT TARSAL TUNNEL RELEASE OF KNOT OF ARIEL RELEASE; Surgeon:SAUL PUENTE; Location: OR     REPLACE VALVE AORTIC N/A 9/3/2015    Procedure: REPLACE VALVE AORTIC;  Surgeon: Antonino Mitchell MD;  Location:  OR     ROTATOR CUFF REPAIR RT/LT      bilateral     SPINE SURGERY      3 spine surgeries     THORACOTOMY, WEDGE RESECTION LUNG, COMBINED Right 3/26/2019    RIGHT THORACOTOMY, WEDGE RESECTION, RIGHT LOWER LOBE LUNG NODULE,;  Surgeon: Jose A Vasques MD;  Location:  OR     TONSILLECTOMY       TURP         Social History   I have reviewed this patient's social history and updated it with pertinent information if needed.  Social History     Tobacco Use     Smoking status: Former Smoker     Packs/day: 2.00     Years: 55.00     Pack years: 110.00     Quit date: 1998     Years since quittin.5     Smokeless tobacco: Never  Used   Substance Use Topics     Alcohol use: Yes     Alcohol/week: 0.0 standard drinks     Comment: 1 drink per day     Drug use: No       Family History   I have reviewed this patient's family history and updated it with pertinent information if needed.   Family History   Problem Relation Age of Onset     C.A.D. Father      Emphysema Father      Melanoma No family hx of      Skin Cancer No family hx of        Medications   Prior to Admission Medications   Prescriptions Last Dose Informant Patient Reported? Taking?   ACE/ARB/ARNI NOT PRESCRIBED (INTENTIONAL)   No No   Sig: Please choose reason not prescribed from choices below.   DULoxetine (CYMBALTA) 60 MG capsule 7/25/2022 at am Self Yes Yes   Sig: Take 60 mg by mouth daily   acetaminophen (TYLENOL) 500 MG tablet 7/24/2022 at am Self No Yes   Sig: Take 2 tablets (1,000 mg) by mouth 3 times daily   albuterol (PROAIR HFA/PROVENTIL HFA/VENTOLIN HFA) 108 (90 Base) MCG/ACT inhaler 7/24/2022 at am Self Yes Yes   Sig: Inhale 1-2 puffs into the lungs every 6 hours as needed   albuterol (PROVENTIL) (2.5 MG/3ML) 0.083% neb solution Past Week at prn Self No Yes   Sig: Take 1 vial (2.5 mg) by nebulization every 6 hours as needed for shortness of breath / dyspnea or wheezing   apixaban ANTICOAGULANT (ELIQUIS) 5 MG tablet 7/22/2022 at pm Self Yes Yes   Sig: Take 5 mg by mouth in the morning and 5 mg in the evening.   atorvastatin (LIPITOR) 10 MG tablet 7/25/2022 at am Self No Yes   Sig: Take 1 tablet (10 mg) by mouth daily   dimenhyDRINATE (DRAMAMINE) 50 MG tablet 7/25/2022 at prn Self Yes Yes   Sig: Take 50 mg by mouth as needed   famotidine (PEPCID) 40 MG tablet 7/24/2022 at am Self Yes Yes   Sig: Take 40 mg by mouth 2 times daily   ferrous sulfate (FE TABS) 325 (65 Fe) MG EC tablet 7/25/2022 at pm Self Yes Yes   Sig: Take 325 mg by mouth 2 times daily   fluticasone-vilanterol (BREO ELLIPTA) 200-25 MCG/INH inhaler  at prn Self Yes Yes   Sig: Inhale 1 puff into the lungs daily  "  gabapentin (NEURONTIN) 300 MG capsule 7/24/2022 at pm Self Yes Yes   Sig: Take 600 mg by mouth At Bedtime (2 x 300 mg = 600 mg)   hydrOXYzine (VISTARIL) 50 MG capsule 7/24/2022 at am Self Yes Yes   Sig: Take 50 mg by mouth daily      Facility-Administered Medications: None     Allergies   Allergies   Allergen Reactions     Lidocaine Blisters     Allergy to lidocaine ointment     Omeprazole Itching     Pantoprazole Itching     Prevacid [Lansoprazole] Itching     Lasix [Furosemide] Rash     Penicillins Rash     \"broke out from injection\" 60 yrs ago  Tolerates cephalosporins       Physical Exam   Vital Signs: Temp: 97.2  F (36.2  C) Temp src: Oral BP: 124/62 Pulse: 64   Resp: 16 SpO2: 95 % O2 Device: Nasal cannula Oxygen Delivery: 3 LPM  Weight: 155 lbs 4.8 oz    Physical Exam    General: Awake, alert, very pleasant man who appears stated age. Looks comfortable sitting up in bed. No acute distress.  HEENT: Normocephalic, atraumatic. Extraocular movements intact.   Respiratory: Clear to auscultation bilaterally, no rales, wheezing, or rhonchi.  Cardiovascular: Regular rate and rhythm, +S1 and S2, no murmur auscultated. Trace peripheral edema to LLE.   Gastrointestinal: Soft, non-tender, non-distended. Bowel sounds present.  Skin: Warm, dry. No obvious rashes or lesions on exposed skin. Dorsalis pedis pulses palpable bilaterally.  Musculoskeletal: No joint swelling, erythema or tenderness. RLE ACE wrapped. Wiggles toes.  Neurologic: AAO x3, forgetful. Poor historian.  Psychiatric: Appropriate mood and affect. No obvious anxiety or depression.     Data   Results for orders placed or performed during the hospital encounter of 07/25/22 (from the past 24 hour(s))   Potassium   Result Value Ref Range    Potassium 3.9 3.4 - 5.3 mmol/L   ABO/Rh type and screen    Narrative    The following orders were created for panel order ABO/Rh type and screen.  Procedure                               Abnormality         Status            "          ---------                               -----------         ------                     Adult Type and Screen[[id number]]                            Edited Result - FINAL        Please view results for these tests on the individual orders.   Adult Type and Screen   Result Value Ref Range    ABO/RH(D) A POS     Antibody Screen Negative Negative    SPECIMEN EXPIRATION DATE [id number]    XR Knee Port Right 1/2 Views    Narrative    KNEE PORTABLE RIGHT ONE TO TWO VIEWS    7/25/2022 12:49 PM     HISTORY:  Postoperative Total Knee    COMPARISON: Radiographs from 5/8/2022.      Impression    IMPRESSION: There is a new right total knee arthroplasty with adjacent  gas. Excellent position and alignment of components. No evidence of  complication or periprosthetic fracture.    NAVEED DOMINGUEZ MD         SYSTEM ID:  [id number]   Basic metabolic panel   Result Value Ref Range    Sodium 141 133 - 144 mmol/L    Potassium 3.8 3.4 - 5.3 mmol/L    Chloride 110 (H) 94 - 109 mmol/L    Carbon Dioxide (CO2) 26 20 - 32 mmol/L    Anion Gap 5 3 - 14 mmol/L    Urea Nitrogen 26 7 - 30 mg/dL    Creatinine 1.33 (H) 0.66 - 1.25 mg/dL    Calcium 8.7 8.5 - 10.1 mg/dL    Glucose 103 (H) 70 - 99 mg/dL    GFR Estimate 51 (L) >60 mL/min/1.73m2   Creatinine   Result Value Ref Range    Creatinine 1.33 (H) 0.66 - 1.25 mg/dL    GFR Estimate 51 (L) >60 mL/min/1.73m2     *Note: Due to a large number of results and/or encounters for the requested time period, some results have not been displayed. A complete set of results can be found in Results Review.

## 2022-07-25 NOTE — ANESTHESIA POSTPROCEDURE EVALUATION
Patient: Hermilo Velasquez    Procedure: Procedure(s):  RIGHT TOTAL KNEE ARTHROPLASTY       Anesthesia Type:  Spinal    Note:  Disposition: Admission   Postop Pain Control: Uneventful            Sign Out: Well controlled pain   PONV: No   Neuro/Psych: Uneventful            Sign Out: Acceptable/Baseline neuro status   Airway/Respiratory: Uneventful            Sign Out: Acceptable/Baseline resp. status   CV/Hemodynamics: Uneventful            Sign Out: Acceptable CV status   Other NRE: NONE   DID A NON-ROUTINE EVENT OCCUR?     Event details/Postop Comments:  Recovery from regional anesthesia has not occurred but is anticipated within 48 hours.            Last vitals:  Vitals Value Taken Time   /62 07/25/22 1330   Temp 36.2  C (97.1  F) 07/25/22 1300   Pulse 70 07/25/22 1341   Resp 15 07/25/22 1341   SpO2 96 % 07/25/22 1341   Vitals shown include unvalidated device data.    Electronically Signed By: Michael Devlin MD  July 25, 2022  1:43 PM

## 2022-07-25 NOTE — PROGRESS NOTES
07/25/22 1730   Quick Adds   Type of Visit Initial PT Evaluation   Living Environment   People in Home spouse   Current Living Arrangements house  (2 level house with basement)   Home Accessibility stairs to enter home;stairs within home   Number of Stairs, Main Entrance 1   Stair Railings, Main Entrance railing on right side (ascending)   Number of Stairs, Within Home, Primary greater than 10 stairs   Stair Railings, Within Home, Primary railing on left side (ascending)   Transportation Anticipated family or friend will provide   Living Environment Comments Patient lives in two story house with 1 step to enter with grab bar. Patient has 10 steps to basement with unilateral handrail in order to shower. Patient lives on main level   Self-Care   Usual Activity Tolerance moderate   Current Activity Tolerance poor   Equipment Currently Used at Home cane, straight;walker, rolling   Fall history within last six months yes   Number of times patient has fallen within last six months 4   Activity/Exercise/Self-Care Comment Per spouse report, patient independent with all mobility and cares with use of front wheeled walker at baseline.   General Information   Onset of Illness/Injury or Date of Surgery 07/25/22   Referring Physician Giuliano Deshpande MD   Patient/Family Therapy Goals Statement (PT) Patient wanting to discharge to TCU for further therapies   Pertinent History of Current Problem (include personal factors and/or comorbidities that impact the POC) 89 year old male s/p R TKA POD #0   Existing Precautions/Restrictions fall;weight bearing   Weight-Bearing Status - RLE weight-bearing as tolerated   Cognition   Affect/Mental Status (Cognition) confused   Orientation Status (Cognition) oriented x 4   Follows Commands (Cognition) WFL   Cognitive Status Comments Patient hard of hearing resulting in difficulties answering questions   Pain Assessment   Patient Currently in Pain Yes, see Vital Sign flowsheet   Range of  Motion (ROM)   ROM Comment post-surgical ROM limitations at R knee   Strength (Manual Muscle Testing)   Strength Comments post-surgical strength limitations at R knee   Bed Mobility   Bed Mobility supine-sit;sit-supine   Supine-Sit Crosby (Bed Mobility) contact guard   Sit-Supine [place] (Bed Mobility) contact guard   Comment, (Bed Mobility) Needing facilitation, otherwise performing with CGA and increased time to complete   Transfers   Transfers sit-stand transfer   Sit-Stand Transfer   Sit-Stand Cros[place] (Transfers) minimum assist (75% patient effort);moderate assist (50% patient effort)   Assistive Device (Sit-Stand Transfers) walker, front-wheeled   Comment, (Sit-Stand Transfer) min-mod assist for sit<>stand at EOB with FWW   Gait/Stairs (Locomotion)   Crosby Level (Gait) minimum assist (75% patient effort);moderate assist (50% patient effort)   Assistive Device (Gait) walker, front-wheeled   Distance in Feet (Required for LE Total Joints) 1' eval + 2' treatment   Comment, (Gait/Stairs) Patient completing short bout of ambulation with FWW and min assist x1 for balance/stability. Difficulty following commands   Balance   Balance Comments impaired standing balance with BUE support   Sensory Examination   Sensory Perception Comments impaired light touch sensation at R foot. Patient later reported peripheral neuropathy.   Clinical Impression   Criteria for Skilled Therapeutic Intervention Yes, treatment indicated   PT Diagnosis (PT) impaired mobility   Influenced by the following impairments post-surgical strength and ROM limitations at R knee, reduced activity tolerance, impaired balance, pain with LE movement   Functional limitations due to impairments impaired functional mobility, impaired gait, transfers, and stair navigation   Clinical Presentation (PT Evaluation Complexity) Stable/Uncomplicated   Clinical Presentation Rationale clinical judgement   Clinical Decision Making (Complexity) low  complexity   Planned Therapy Interventions (PT) balance training;bed mobility training;gait training;neuromuscular re-education;patient/family education;stair training;strengthening;progressive activity/exercise;transfer training   Risk & Benefits of therapy have been explained evaluation/treatment results reviewed;care plan/treatment goals reviewed;risks/benefits reviewed;current/potential barriers reviewed;participants voiced agreement with care plan;participants included;patient;spouse/significant other   PT Discharge Planning   PT Discharge Recommendation (DC Rec)   (defer to ortho)   PT Rationale for DC Rec Patient well below baseline functional status at this time. Patient  independent with mobility at baseline using front wheeled walker. Patient currently needing FWW and increased assist for all mobility. Patient with significant difficulty ambulating at this time. Anticipate that patient will need discharge to TCU for further therapies in order to improve strength, functional mobility, safety, and independence prior to returning home with wife.   Plan of Care Review   Plan of Care Reviewed With patient;spouse   Total Evaluation Time   Total Evaluation Time (Minutes) 10   Physical Therapy Goals   PT Frequency 2x/day   PT Predicted Duration/Target Date for Goal Attainment 08/01/22   PT Goals Bed Mobility;Transfers;Gait;Stairs   PT: Bed Mobility Modified independent;Supine to/from sit   PT: Transfers Modified independent;Bed to/from chair;Assistive device   PT: Gait 150 feet;Supervision/stand-by assist;Rolling walker   PT: Stairs Greater than 10 stairs;Minimal assist

## 2022-07-25 NOTE — PROVIDER NOTIFICATION
Spoke with Dr. Deshpande regarding clarification on ASA 81mg BID and Eliquis 5mg daily.  Dr. Deshpande gave telephone order to discontinue the Aspirin order and have pt remain on Eliquis 5mg daily and start Eliquis tomorrow morning.  Dr. Deshpande stated that he does not want pt on Eliquis 5mg twice daily due to risk of bleeding.  He wants pt to take Eliquis 5 mg only once daily.  Also received orders from Dr. Deshpande to discontinue Lactated Ringer's infusion orders.

## 2022-07-26 ENCOUNTER — APPOINTMENT (OUTPATIENT)
Dept: PHYSICAL THERAPY | Facility: CLINIC | Age: 87
DRG: 469 | End: 2022-07-26
Attending: ORTHOPAEDIC SURGERY
Payer: COMMERCIAL

## 2022-07-26 LAB
ANION GAP SERPL CALCULATED.3IONS-SCNC: 7 MMOL/L (ref 3–14)
BUN SERPL-MCNC: 24 MG/DL (ref 7–30)
CALCIUM SERPL-MCNC: 8.1 MG/DL (ref 8.5–10.1)
CHLORIDE BLD-SCNC: 111 MMOL/L (ref 94–109)
CO2 SERPL-SCNC: 24 MMOL/L (ref 20–32)
CREAT SERPL-MCNC: 1.21 MG/DL (ref 0.66–1.25)
GFR SERPL CREATININE-BSD FRML MDRD: 57 ML/MIN/1.73M2
GLUCOSE BLD-MCNC: 105 MG/DL (ref 70–99)
GLUCOSE BLD-MCNC: 105 MG/DL (ref 70–99)
GLUCOSE BLDC GLUCOMTR-MCNC: 108 MG/DL (ref 70–99)
HGB BLD-MCNC: 10.4 G/DL (ref 13.3–17.7)
HOLD SPECIMEN: NORMAL
LACTATE SERPL-SCNC: 1.8 MMOL/L (ref 0.7–2)
POTASSIUM BLD-SCNC: 4.5 MMOL/L (ref 3.4–5.3)
SODIUM SERPL-SCNC: 142 MMOL/L (ref 133–144)

## 2022-07-26 PROCEDURE — 80048 BASIC METABOLIC PNL TOTAL CA: CPT | Performed by: PHYSICIAN ASSISTANT

## 2022-07-26 PROCEDURE — 97530 THERAPEUTIC ACTIVITIES: CPT | Mod: GP

## 2022-07-26 PROCEDURE — 120N000001 HC R&B MED SURG/OB

## 2022-07-26 PROCEDURE — 258N000003 HC RX IP 258 OP 636: Performed by: INTERNAL MEDICINE

## 2022-07-26 PROCEDURE — 36415 COLL VENOUS BLD VENIPUNCTURE: CPT | Performed by: PHYSICIAN ASSISTANT

## 2022-07-26 PROCEDURE — 83605 ASSAY OF LACTIC ACID: CPT | Performed by: INTERNAL MEDICINE

## 2022-07-26 PROCEDURE — 250N000011 HC RX IP 250 OP 636: Performed by: ORTHOPAEDIC SURGERY

## 2022-07-26 PROCEDURE — 85018 HEMOGLOBIN: CPT | Performed by: ORTHOPAEDIC SURGERY

## 2022-07-26 PROCEDURE — 250N000013 HC RX MED GY IP 250 OP 250 PS 637: Performed by: PHYSICIAN ASSISTANT

## 2022-07-26 PROCEDURE — 258N000003 HC RX IP 258 OP 636: Performed by: ORTHOPAEDIC SURGERY

## 2022-07-26 PROCEDURE — 999N000128 HC STATISTIC PERIPHERAL IV START W/O US GUIDANCE

## 2022-07-26 PROCEDURE — 36415 COLL VENOUS BLD VENIPUNCTURE: CPT | Performed by: INTERNAL MEDICINE

## 2022-07-26 PROCEDURE — 99233 SBSQ HOSP IP/OBS HIGH 50: CPT | Performed by: INTERNAL MEDICINE

## 2022-07-26 PROCEDURE — 250N000013 HC RX MED GY IP 250 OP 250 PS 637: Performed by: ORTHOPAEDIC SURGERY

## 2022-07-26 RX ORDER — TRAMADOL HYDROCHLORIDE 50 MG/1
50 TABLET ORAL EVERY 6 HOURS PRN
Qty: 30 TABLET | Refills: 0 | Status: SHIPPED | OUTPATIENT
Start: 2022-07-26 | End: 2022-07-27

## 2022-07-26 RX ORDER — FAMOTIDINE 20 MG/1
40 TABLET, FILM COATED ORAL DAILY
Status: DISCONTINUED | OUTPATIENT
Start: 2022-07-27 | End: 2022-07-31 | Stop reason: HOSPADM

## 2022-07-26 RX ADMIN — FERROUS SULFATE TAB 325 MG (65 MG ELEMENTAL FE) 325 MG: 325 (65 FE) TAB at 21:47

## 2022-07-26 RX ADMIN — FLUTICASONE FUROATE AND VILANTEROL TRIFENATATE 1 PUFF: 200; 25 POWDER RESPIRATORY (INHALATION) at 07:36

## 2022-07-26 RX ADMIN — TRAMADOL HYDROCHLORIDE 50 MG: 50 TABLET, COATED ORAL at 10:37

## 2022-07-26 RX ADMIN — GABAPENTIN 600 MG: 300 CAPSULE ORAL at 21:47

## 2022-07-26 RX ADMIN — CEFAZOLIN 1 G: 1 INJECTION, POWDER, FOR SOLUTION INTRAMUSCULAR; INTRAVENOUS at 03:19

## 2022-07-26 RX ADMIN — DULOXETINE 60 MG: 60 CAPSULE, DELAYED RELEASE ORAL at 08:05

## 2022-07-26 RX ADMIN — HYDROXYZINE PAMOATE 50 MG: 25 CAPSULE ORAL at 07:34

## 2022-07-26 RX ADMIN — SODIUM CHLORIDE: 9 INJECTION, SOLUTION INTRAVENOUS at 15:58

## 2022-07-26 RX ADMIN — FAMOTIDINE 40 MG: 20 TABLET ORAL at 07:33

## 2022-07-26 RX ADMIN — ACETAMINOPHEN 975 MG: 325 TABLET ORAL at 07:32

## 2022-07-26 RX ADMIN — APIXABAN 5 MG: 5 TABLET, FILM COATED ORAL at 10:38

## 2022-07-26 RX ADMIN — SENNOSIDES AND DOCUSATE SODIUM 1 TABLET: 50; 8.6 TABLET ORAL at 21:47

## 2022-07-26 RX ADMIN — SODIUM CHLORIDE 500 ML: 9 INJECTION, SOLUTION INTRAVENOUS at 16:00

## 2022-07-26 RX ADMIN — METOPROLOL SUCCINATE 12.5 MG: 25 TABLET, EXTENDED RELEASE ORAL at 08:05

## 2022-07-26 RX ADMIN — ATORVASTATIN CALCIUM 10 MG: 10 TABLET, FILM COATED ORAL at 08:05

## 2022-07-26 RX ADMIN — ACETAMINOPHEN 975 MG: 325 TABLET ORAL at 15:33

## 2022-07-26 RX ADMIN — POLYETHYLENE GLYCOL 3350 17 G: 17 POWDER, FOR SOLUTION ORAL at 08:05

## 2022-07-26 RX ADMIN — SENNOSIDES AND DOCUSATE SODIUM 1 TABLET: 50; 8.6 TABLET ORAL at 07:34

## 2022-07-26 RX ADMIN — FERROUS SULFATE TAB 325 MG (65 MG ELEMENTAL FE) 325 MG: 325 (65 FE) TAB at 07:34

## 2022-07-26 ASSESSMENT — ACTIVITIES OF DAILY LIVING (ADL)
ADLS_ACUITY_SCORE: 28
ADLS_ACUITY_SCORE: 31
ADLS_ACUITY_SCORE: 31
ADLS_ACUITY_SCORE: 28
ADLS_ACUITY_SCORE: 40
ADLS_ACUITY_SCORE: 40
ADLS_ACUITY_SCORE: 31
ADLS_ACUITY_SCORE: 28
ADLS_ACUITY_SCORE: 40
ADLS_ACUITY_SCORE: 27
ADLS_ACUITY_SCORE: 28
ADLS_ACUITY_SCORE: 31

## 2022-07-26 NOTE — PROVIDER NOTIFICATION
Pt up in recliner chair. Difficult to arouse. O2 sats 78-80. BP low -see flowsheet. Patient needed to be transferred back to bed with ceiling lift. MD notified. Bolus started. O2 applied per nasal cannula but sats didn't improve much. Oxymask applied at 6 liters and sats improved to 92%. Beginning to respond better but still lethargic. Able to use urinal. Poor po- coughing after a few bites. Tele NSR. Will continue to monitor.

## 2022-07-26 NOTE — PROGRESS NOTES
Ortho  Too sleepy    Awakens      The  Only med that could  Cause  This  Is tramadol,  He  Has had  So little  Sedatives  And narcs,     will hold  The tramadol  And go with plain tylenol

## 2022-07-26 NOTE — OP NOTE
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  Fixed varus.     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  Fixed varus.     PROCEDURE:  Right total knee arthroplasty, complex.    IMPLANTS:  Biomet Vanguard knee; 72.5 femur, posterior stabilized 79 tibial tray, 14 mm posterior stabilized plus size stem and a 37 mm all-poly patellar button.    FIRST ASSISTANT:  Sd Lieberman    SURGEON:  Giuliano Deshpande MD    ANESTHESIA:  Spinal plus block.  Minimal sedation preop.    ESTIMATED BLOOD LOSS:  10 mL    COMPLICATIONS:  None.     DRAINS:  One Hemovac drain.    MEDICATIONS:  Two grams Ancef given preoperatively.  Tranexamic acid used IV postoperatively.     OTHER MEDICATIONS:  Pain cocktail from [facility] 100 mL dose and 1 gram powdered vancomycin placed into the joint.    DESCRIPTION OF PROCEDURE:  Identification made.  The patient was brought to the operating room, prepped and draped in usual fashion.  I did a tourniquet.  Ready for surgery.  Timeout was done.  We proceeded then.  Tourniquet was inflated to 300 after exsanguination of the leg.    The patient has a fixed flexion contracture of about 20+ degrees and fixed varus.  Dissection carried down.  Medial retinaculum parapatellar approach made.  We evacuated the fluid.  Released the tight patellofemoral joint, brought it up into vision, cut it back, sizing at 37.  Drilled the holes.  Fat pad debrided away.  Then brought it over the edge without eversion.  Flexed the knee up showing complete eburnated bone medially and the sulcus of the patellofemoral joint.  Following this, intramedullary guiding system was utilized to produce a 5-degree valgus cut of the distal femur.  Then, following the epicondylar axis, the rest of the cuts were made with an open box blade.  Large osteophytes were removed posteriorly.   We released it widely medially and mid coronal plane laterally.  Popliteus was frayed.  Meniscus were compressed posteriorly.  ACL and PCL debrided back.  Then, the tibia was cut perpendicular to the long axis based on preoperative templating, centering it over the talus.    We then cleaned things up carefully and thoroughly.  Post-hole placed.  Drilled cement holes for the anchors, cement anchoring.    Irrigated copiously.  Then, beginning injected the pain cocktail 1 mm increments, posterior capsule without problem, 1 mm increments constantly aspirating.  Jet lavaged it, dried it, cemented all 3 components in simultaneously using 2 batches of high viscosity cement.  Cleaned the cement debris up.  It tracked well.  Patella tracked well up to a size 14 insert.  Locked it in place.  Double and triple checked for cement debris.  The patella tracked well.    Closure was then done after inspection for bleeding.  Washed another 500 mL of saline through it.  Betadine wash.  Saline.    Closure was done with interrupted 0 Ethibond, followed by running #1 Stratafix.  Powdered vancomycin was placed in the joint, 1 gram.  Deep 2-0 Vicryl, subcutaneous 2-0 Vicryl, staples in the skin.  Drain was brought out.  Plan to keep uncompressed for 2 hours.  Sponge and needle counts were correct x2.  The patient transferred awake and alert to recovery room.  Tourniquet time about 85 minutes at 300.  Again complex was wide release needed, able to get out to the +3 cut of the distal femur, able to get out in extension.  Minimal resection was taken off the medial side of the tibia and it worked well.  We did not have to make any secondary cuts then.  Posterior stabilized 72.5 femur by Vanguard, a 79 tibial fixed I-beam post, patella was 37 mm button and went with a size 14 thick fitting a 79 tray, one locking clip applied and two pegs on the femur.    Giuliano Deshpande MD        D: 07/26/2022   T: 07/26/2022   MT: ALF    Name:      CLYDE RODRIGUEZ  MRN:      [id number]        Account:        [id number]   :      1932           Procedure Date: 2022     Document: [id number]

## 2022-07-26 NOTE — PLAN OF CARE
OT: Orders received. Chart reviewed and discussed with care team.  OT not indicated due to current plans for pt to discharge to TCU. All pt needs met with IP PT.  Defer discharge recommendations to IP PT and next level of care.  Will complete orders.

## 2022-07-26 NOTE — PROGRESS NOTES
anthony      Pt is  Very  Fragile    Medically      Will need  A  Tcu    dont know if he  Ever pre regestered  For  [facility]  But  May need  To look  Elsewhere if he  hadnt .

## 2022-07-26 NOTE — CONSULTS
Care Management Initial Consult    General Information  Assessment completed with: VM-chart review, Care Team Member, chart review,  updated patient  Type of CM/SW Visit: Initial Assessment    Primary Care Provider verified and updated as needed: Yes   Readmission within the last 30 days:        Reason for Consult: discharge planning  Advance Care Planning:            Communication Assessment  Patient's communication style: spoken language (English or Bilingual)    Hearing Difficulty or Deaf: yes   Wear Glasses or Blind: yes    Cognitive  Cognitive/Neuro/Behavioral: WDL                      Living Environment:   People in home: spouse  Roberta  Current living Arrangements: house      Able to return to prior arrangements: yes       Family/Social Support:  Care provided by: spouse/significant other  Provides care for: no one  Marital Status:   Wife  Roberta       Description of Support System: Supportive         Current Resources:   Patient receiving home care services:       Community Resources:    Equipment currently used at home: cane, straight, walker, rolling  Supplies currently used at home:      Employment/Financial:  Employment Status: retired        Financial Concerns:             Lifestyle & Psychosocial Needs:  Social Determinants of Health     Tobacco Use: Medium Risk     Smoking Tobacco Use: Former Smoker     Smokeless Tobacco Use: Never Used   Alcohol Use: Not on file   Financial Resource Strain: Not on file   Food Insecurity: Not on file   Transportation Needs: Not on file   Physical Activity: Not on file   Stress: Not on file   Social Connections: Not on file   Intimate Partner Violence: Not on file   Depression: Not at risk     PHQ-2 Score: 0   Housing Stability: Not on file       Functional Status:  Prior to admission patient needed assistance:              Mental Health Status:          Chemical Dependency Status:                Values/Beliefs:  Spiritual, Cultural Beliefs, Jew Practices,  Values that affect care:                 Additional Information:  Consult for discharge planning. Patient admitted on 7/25/22 for osteoarthritis of right knee and a tentative discharge date of 7/27/22.  Writer reviewed chart and TCU recommendations at discharge. Patient in agreement for TCU at discharge and would like referrals sent to [facility] and [facility].     Patient has been vaccinated for COVID and has had the booster. Writer encouraged patient and/or family to reach out to facility directly for most up to date visitor guidelines.     Writer discussed transportation options and possible out of pocket costs of transport with patient. Patient would like TBD at discharge.       SY Ramsey

## 2022-07-26 NOTE — BRIEF OP NOTE
[facility]    Brief Operative Note    Pre-operative diagnosis: Osteoarthritis of right knee [M17.11]  Post-operative diagnosis Same as pre-operative diagnosis    Procedure: Procedure(s):  RIGHT TOTAL KNEE ARTHROPLASTY  Surgeon: Surgeon(s) and Role:     * Giuliano Deshpande MD - Primary       ASSbatsheva Gallagher   Anesthesia: Choice   Estimated Blood Loss: 10 mL from 7/25/2022 10:21 AM to 7/25/2022 12:30 PM      Drains: Hemovac  Specimens:   ID Type Source Tests Collected by Time Destination   A : right knee bone disposed per policy and exclusion list Bone Fragments Bone OR DOCUMENTATION ONLY Giuliano Deshpande MD 7/25/2022 12:19 PM      Findings:   None.  Complications: None       .  Implants:   Implant Name Type Inv. Item Serial No.  Lot No. LRB No. Used Action   BONE CEMENT RADIOPAQUE SIMPLEX HV FULL DOSE 6194-1-001 - [id number] Cement, Bone BONE CEMENT RADIOPAQUE SIMPLEX HV FULL DOSE 6194-1-001  [employer] [id number] Right 1 Implanted   BONE CEMENT RADIOPAQUE SIMPLEX HV FULL DOSE 6194-1-001 - [id number] Cement, Bone BONE CEMENT RADIOPAQUE SIMPLEX HV FULL DOSE 6194-1-001  [employer] [id number] Right 1 Implanted   IMP VANGUARD DISTAL FEMORAL PEG    [employer] [id number] Right 1 Implanted   IMP COMP FEMORAL VANGARD BIOM PS RT 72.5MM [id number] - [id number] Total Joint Component/Insert IMP COMP FEMORAL VANGARD BIOM PS RT 72.5MM [id number]  [employer] [id number] Right 1 Implanted   IMP VANGUARD SERIES A STANDARD PATELLA 37MM/10MM    [employer] [id number] Right 1 Implanted   IMP BIOMET FIXED I-BEAM TIPIAL PLATE 79MM    BI[employer]ET [id number] Right 1 Implanted   IMP INSERT TIBIAL BIOM PS PLUS BEARING 14X79/83MM [id number] - [id number] Total Joint Component/Insert IMP INSERT TIBIAL BIOM PS PLUS BEARING 14X79/83MM [id number]  [employer] [id number] Right 1 Implanted

## 2022-07-26 NOTE — PROGRESS NOTES
[facility]    Internal Medicine Hospitalist Progress Note  07/26/2022  I evaluated patient on the above date.    Leland Trinh Jr., MD  [phone] (p)  Text Page  Vocera        Assessment & Plan New actions/orders today (07/26/2022) are underlined.    Hermilo Velasquez is a very pleasant 89 year old male with a past medical history including COPD; AS s/p AVR; AF; AAA; h/o lung cancer s/p resection; h/o NHL s/p chemotherapy; h/o DVT/PE; who underwent elective R /2022. IM Hospitalist service consulted for medical comanagement.    Status post right total knee arthroplasty 7/25/2022.  - Post-op management per Ortho.  - Continue acetaminophen 975 mg q8h; PRN acetaminophen, PRN tramadol; minimize opioids as able.  - Continue apixaban at lower dose per Ortho.  - Continue PT, OT.    Blood loss anemia on chronic iron deficiency anemia.  * Hx PATIENCE and occult GI bleeding. Hgb as low as 6.8 on 4/11/2022 requiring transfusion. EGD 4/12/2022 showed erosive gastropathy but no stigmata of bleed. Colonoscopy 4/12/2022 showed diverticulosis and multiple AVM but no source of bleed, they were treated. Since then he has been primarily 9-10 range. Received Iron infusions. Most recent Hgb 12.1 6/27/2022.  * Hgb 10.4 on 7/26.  Recent Labs   Lab 07/26/22  0703   HGB 10.4*   - Monitor CBC.  - Consider prbc transfusion if hgb </= 7.0 or if significant bleeding with hemodynamic instability or if symptomatic.    Report of skin tears s/p recent fall.  Frequent falls.  * Of note, was admitted in 5/2022 with right knee pain s/p fall, with right knee effusion and hemarthrosis in setting of DOAC.   - Continue local wound cares per WOCN to evaluate skin tears    Chronic atrial fibrillation on chronic anticoagulation.  * PTA on apixaban. On 7/25, noted that pt had been on low dose metoprolol XL, taken off med list, however did not seen in any notes that this was to be discontinued. It was d/w pt's wife and she thought  [facility] unclear of [facility] dose. Started on metoprolol XL 12.5 mg daily 7/26.  - Continue to monitor on telemetry.  - Continue metoprolol XL 12.5 mg daily.  - Continue apixaban at lower dose per Ortho.  - Continue PRN IV metoprolol for sustained heart rate >120.  - Monitor K+/Mg++, replace PRN.    COPD.  - Continue fluticasone-vilanterol; PRN albuterol.  - Monitor pulse oximetry/capnography during this hospitalization per protocol.    Chronic kidney disease, stage 3a.  * Baseline Cr 1.1-1.3.   * Cr on admission, 1.33.  - Monitor BMP.  - Avoid nephrotoxic medications.    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  * On 7/25, noted that no hx dementia/cognitive impairment, but clearly has cognitive deficits versus is just very hard of hearing during prior admissions.   - Re-orient as needed.  - Maintain normal day/night, sleep/wake cycles.  - Minimize sedating medications as able.  - Patient could possibly benefit from getting neurocognitive evaluation in an outpatient setting if not already done.    Aortic stenosis s/p TAVR with bioprosthetic valve 2015.  * Coronary angiogram at that time was negative for obstructive CAD. TTE 4/2021 showed preserved LVEF with mild to moderate prosthetic valve stenosis.   - Monitor clinically.    Chronic peripheral neuropathy.  - Continue PTA gabapentin 600 mg at bedtime.    Dyslipidemia.  * Chronic and stable.  - Continue atorvastatin.    GERD.  * Chronic and stable.  - Continue famotidine.    Bullous pemphigoid and chronic pruritus.  - Continue hydroxyzine.    MGUS.  History of stage 4 Non-Hodgkins Lymphoma in remission  * Followed by Dr. Zurita of [facility]. His lymphoma was diagnosed and treated with systemic chemotherapy in 2012 and reportedly has been in remission since. His MGUS is being monitored as an outpatient.  - Follow-up outpatient.    AAA.  * CT in 4/2022 showed a 3.5 cm saccular infrarenal abdominal aortic aneurysm.  - Monitor  outpatient.    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  - Continue apixaban.    History of lung cancer status post wedge resection 2019.  - Noted.      Clinically Significant Risk Factors Present on Admission               # Coagulation Defect: home medication list includes an anticoagulant medication   # Hypertension: home medication list includes antihypertensive(s)            COVID-19 testing.  COVID-19 PCR Results    COVID-19 PCR Results 8/15/20 9/13/20 9/13/20 10/23/20 10/23/20 4/13/21 3/5/22 4/10/22 5/8/22 7/22/22     1256 1256 1702 1702        COVID-19 Virus PCR to [facility] - Result Not Detected Test received-See reflex to IDDL test SARS CoV2 (COVID-19) Virus RT-PCR  Test received-See reflex to IDDL test SARS CoV2 (COVID-19) Virus RT-PCR         COVID-19 Virus PCR to [facility] - Source Nasopharyngeal Nasopharyngeal  Nasopharyngeal         COVID-19 Virus by PCR (External Result)       Not Detected      SARS-CoV-2 Virus Specimen Source   Nasopharyngeal  Nasopharyngeal        Flu A/B & SARS-COV-2 PCR Source      Nasopharyngeal       SARS-CoV-2 PCR Result   NEGATIVE  NEGATIVE NEGATIVE       SARS CoV2 PCR        Negative Negative Negative      Comments are available for some flowsheets but are not being displayed.         COVID-19 Antibody Results, Testing for Immunity    COVID-19 Antibody Results, Testing for Immunity   No data to display.             Diet: Regular Diet Adult  Low Saturated Fat Na <2400 mg    Prophylaxis: PCD's, ambulation. On apixaban.  Suazo Catheter: Not present  Central Lines: None  Code Status: Full Code    Disposition Plan   Expected discharge: 1-2d recommended to prior living arrangement per Ortho.  Entered: Leland Trinh MD 07/26/2022, 10:38 AM         Interval History   Doing OK.  Denies chest pain or dyspnea.    -Data reviewed today: I reviewed all new labs and imaging over the last 24 hours. I personally reviewed no images or EKG's  "today.    Physical Exam    , Blood pressure 126/64, pulse 96, temperature 97.5  F (36.4  C), temperature source Oral, resp. rate 16, height 1.778 m (5' 10\"), weight 75.8 kg (167 lb 1.7 oz), SpO2 91 %. O2 Device: Oxymask Oxygen Delivery: 2.5 LPM  Vitals:    07/25/22 0854 07/26/22 0201   Weight: 70.4 kg (155 lb 4.8 oz) 75.8 kg (167 lb 1.7 oz)     Vital Signs with Ranges  Temp:  [96  F (35.6  C)-98.1  F (36.7  C)] 97.5  F (36.4  C)  Pulse:  [64-96] 96  Resp:  [9-30] 16  BP: (107-129)/(51-65) 126/64  SpO2:  [88 %-100 %] 91 %  Patient Vitals for the past 24 hrs:   BP Temp Temp src Pulse Resp SpO2 Weight   07/26/22 0752 126/64 97.5  F (36.4  C) Oral 96 16 91 % --   07/26/22 0400 116/58 -- -- 71 15 95 % --   07/26/22 0201 -- -- -- -- -- -- 75.8 kg (167 lb 1.7 oz)   07/25/22 2323 107/52 97.6  F (36.4  C) Oral 69 16 92 % --   07/25/22 2053 -- -- -- -- 16 -- --   07/25/22 2008 -- -- -- -- -- 92 % --   07/25/22 1840 108/56 97.5  F (36.4  C) Oral 66 16 96 % --   07/25/22 1718 128/62 (!) 96  F (35.6  C) Axillary 72 17 (!) 88 % --   07/25/22 1644 119/51 -- -- 66 18 96 % --   07/25/22 1551 124/62 -- -- 64 16 95 % --   07/25/22 1459 124/62 97.2  F (36.2  C) Oral 69 15 92 % --   07/25/22 1354 -- -- -- -- -- 96 % --   07/25/22 1350 115/57 -- -- 68 9 97 % --   07/25/22 1330 116/62 -- -- 71 18 97 % --   07/25/22 1315 129/55 -- -- 66 20 97 % --   07/25/22 1300 121/65 97.1  F (36.2  C) Temporal 70 14 97 % --   07/25/22 1245 123/61 -- -- 67 30 99 % --   07/25/22 1231 124/63 98.1  F (36.7  C) Temporal 68 14 100 % --     I/O's Last 24 hours  I/O last 3 completed shifts:  In: 940 [P.O.:240; I.V.:700]  Out: 485 [Urine:200; Drains:275; Blood:10]    Constitutional: Awake, alert.  Respiratory: Diminished in bases. No crackles or wheezes.  Cardiovascular: RRR, no m/r/g.  GI: Soft, nt, nd, +BS.  Skin/Integumen:   Other:        Data   Recent Labs   Lab 07/26/22  0703 07/26/22  0158 07/25/22  2321 07/25/22  1524 07/25/22  0844   HGB 10.4*  --   --   " --   --      --   --  141  --    POTASSIUM 4.5  --   --  3.8 3.9   CHLORIDE 111*  --   --  110*  --    CO2 24  --   --  26  --    BUN 24  --   --  26  --    CR 1.21  --   --  1.33*  1.33*  --    ANIONGAP 7  --   --  5  --    AMRITA 8.1*  --   --  8.7  --    *  105* 108* 115* 103*  --      Recent Labs   Lab Test 07/26/22  0703 07/26/22  0158 07/25/22  2321 07/25/22  1524 05/09/22  0917 09/15/20  0735 09/15/20  0200 03/29/20  0611 03/29/20  0157 03/25/20  0740 03/24/20  2206 03/24/20  1826 03/23/20  0459 03/23/20  0431   *  105* 108* 115* 103* 92   < >  --    < >  --    < >  --   --    < >  --    BGM  --   --   --   --   --   --  163*  --  103*  --  120* 121*  --  131*    < > = values in this interval not displayed.     Recent Labs   Lab 07/26/22  1024   LACT 1.8         Recent Results (from the past 24 hour(s))   XR Knee Port Right 1/2 Views    Narrative    KNEE PORTABLE RIGHT ONE TO TWO VIEWS    7/25/2022 12:49 PM     HISTORY:  Postoperative Total Knee    COMPARISON: Radiographs from 5/8/2022.      Impression    IMPRESSION: There is a new right total knee arthroplasty with adjacent  gas. Excellent position and alignment of components. No evidence of  complication or periprosthetic fracture.    NAVEED DOMINGUEZ MD         SYSTEM ID:  [id number]       Medications   All medications were reviewed.    sodium chloride 100 mL/hr at 07/25/22 1553       acetaminophen  975 mg Oral Q8H     apixaban ANTICOAGULANT  5 mg Oral Daily     atorvastatin  10 mg Oral Daily     ceFAZolin  2 g Intravenous See Admin Instructions     DULoxetine  60 mg Oral Daily     famotidine  40 mg Oral BID     ferrous sulfate  325 mg Oral BID     fluticasone-vilanterol  1 puff Inhalation Daily     gabapentin  600 mg Oral At Bedtime     hydrOXYzine  50 mg Oral Daily     metoprolol succinate ER  12.5 mg Oral Daily     polyethylene glycol  17 g Oral Daily     senna-docusate  1 tablet Oral BID     tranexamic acid  1 g Intravenous Once      [START ON 7/28/2022] acetaminophen, albuterol, albuterol, sore throat lozenge, bisacodyl, dimenhyDRINATE, diphenhydrAMINE, fentaNYL, hydrALAZINE, hydrOXYzine, lidocaine 4%, lidocaine (buffered or not buffered), lidocaine (buffered or not buffered), magnesium hydroxide, metoprolol, midazolam, naloxone **OR** naloxone **OR** naloxone **OR** naloxone, ondansetron **OR** ondansetron, prochlorperazine **OR** prochlorperazine, sodium chloride (PF), traMADol

## 2022-07-26 NOTE — PROGRESS NOTES
Care Management Follow Up    Length of Stay (days): 1    Expected Discharge Date: 07/28/2022     Concerns to be Addressed:       Patient plan of care discussed at interdisciplinary rounds: Yes    Anticipated Discharge Disposition: Skilled Nursing Facility, Transitional Care     Anticipated Discharge Services: None  Anticipated Discharge DME: None    Patient/family educated on Medicare website which has current facility and service quality ratings: yes  Education Provided on the Discharge Plan:    Patient/Family in Agreement with the Plan: yes    Referrals Placed by CM/SW:    Private pay costs discussed: Not applicable    Additional Information:  Writer attempted to meet with patient [facility] but patient would not wake up.     SW to follow regarding discharge planning.      SY Ramsey

## 2022-07-26 NOTE — PROGRESS NOTES
"INTERNAL MEDICINE UPDATE    Called re: hypotension    Pt hypotensive in the 70's, more lethargic.    I ordered a fluid bolus 500 ml NS. I re-evaluated him after the bolus started. He was arousable, improved from earlier lethargy, delay in answering simple questions. On exam, heart somewhat diminished, irregular, rate normal,  +I/VI systolic m, no g/r; lungs lungs diminished in bases, no crackles or wheezes; abdomen soft, nt, nd, +BS. Further into the bolus, was more alert and answering questions more quickly, said he felt \"pretty good\".    A/P:  * Hypotension with lethargy, question related to opioids (last received tramadol ~10 am).  - Agree with stopping tramadol.  - Finish bolus and continue IVF's thereafter.  - Continue to monitor closely    Leland Trinh Jr., MD  [phone] (p)  Text Page  Vocera      "

## 2022-07-26 NOTE — PLAN OF CARE
Goal Outcome Evaluation:    POD1 for Right TKA.  Alert & Oriented x4.Alutiiq.  VSS, Q3rmfn-rxv low to 84-86 at around 3 AM wit O2 at 3L NC. Sleeping with mouth open, changed to oxymask at 2L and sats above 90's.CDI- CDI, ice packs applied to RLE.  CMS intact.  Hemovac intact and patent draining with bright red output. On Low saturated fat Na, 2400 mg diet.  On 0.9%NS infusing @ 100 ml/hr.  On intermittent IV ABX.  Denied pain.  Mepilex to skin tears on BUE; WOC RN following.  Bladderscanned for 366 ml this shift. To continue to monitor.

## 2022-07-26 NOTE — PROGRESS NOTES
"ORTHOPEDIC LOWER EXTREMITY PROGRESS NOTE    POD#1  Patient is a 89 year old male who underwent Procedure(s):  RIGHT TOTAL KNEE ARTHROPLASTY on 2022. Pain is well controlled. No events overnight.  Needs TCU at discharge    Vitals:   Blood pressure 126/64, pulse 96, temperature 97.5  F (36.4  C), temperature source Oral, resp. rate 16, height 1.778 m (5' 10\"), weight 75.8 kg (167 lb 1.7 oz), SpO2 91 %.  Temp (24hrs), Av.3  F (36.3  C), Min:96  F (35.6  C), Max:98.1  F (36.7  C)      Drains: hemovac    EXAM   The patient is sleepy, hard to keep awake during interview  Calves are soft and non-tender.   Sensation is intact.  Dorsiflexion and plantar flexion is intact.  Foot warm with nl cap refill.  The incision is covered.     Labs:   Recent Labs   Lab Test 22  0703 22  1623 22  1511 22  2336 22  1223 10/24/20  0037 10/23/20  1617 20  0829 20  0948   HGB 10.4* 12.1* 10.3*   < > 11.2*   < > 6.6*   < > 7.7*   INR  --   --   --   --  1.99*  --  1.39*  --  1.21*    < > = values in this interval not displayed.       ASSESSMENT  S/p R TKA   PLAN  1. DVT prophylaxis: Eliquis  2. Weight Bearing: WBAT (Weight bearing as tolerated).  3. Anticipated discharge date 1-2 days pending progress in PT and medical clearance. Discharge to Skilled Nursing Facility, tbd.  SW following, appreciate assistance  4. Cont Pain Control Tylenol and Tramadol    Soila Rivera PA-C  [facility]        "

## 2022-07-26 NOTE — PHARMACY
Asked by hospitalist service to investigate if pt is on Metoprolol still and what current dose is.  The following information was gathered:    1.  Pt was on Metoprolol XL 12.5 mg daily during previous admission in May 2022.  This was continued at TCU and is on d/c list from [facility].    2.  Pt' wife found 2 bottles of Metoprolol at home.  One bottle from  was Metoprolol  mg, taking 1/2 tab (50 mg) daily.  The other bottle was from 2021 for Metoprolol XL 25 mg, taking 1/2 tab (12.5 mg) daily.  Unclear if pt using either of these medications at home at this time.    3.  [facility] pharmacy was called to see if any additional fill history.  They last filled Metoprolol XL 25 mg tabs, 1/2 tab (12.5 mg) daily in 2021.  No fills since that time.    4.  [facility]  [facility] summary in care everywhere lists Metoprolol XL 12.5 mg daily as an  medication as of 2022.    5.  Pt initially reported to [facility] scribe to not be taking Metoprolol.  Upon my questioning, he was uncertain.    6.  Clinic records in [facility] all list Metoprolol XL 12.5 mg daily on list.    Based on the above findings, I cannot confirm if pt is taking Metoprolol at home at this time.  It appears that he is supposed to be taking it, since it is on his clinic records, but I don't have access to [facility] to determine if it was stopped by a provider there.  I did not add this med to PTA med list, as a new Rx will be necessary if restarting this medication is desired.    Thank you for this medication consult.  Crissy Gilliam, EmeliD

## 2022-07-26 NOTE — PROGRESS NOTES
"A&Ox2. Pleasant. Naps a lot between cares. Up in chair. A-2 with transfers. Unsteady and weak. Pain well managed with oral agents. Hemovac removed, per order. Tip intact. New dressing applied to R knee's incision. Tolerated well. Off oxygen. SPO2 around 92% on RA. Appetite poor but tolerating diet. Tele: NSR. VSS. Latest Vitals: Blood pressure 133/74, pulse 88, temperature 97.3  F (36.3  C), temperature source Oral, resp. rate 18, height 1.778 m (5' 10\"), weight 75.8 kg (167 lb 1.7 oz), SpO2 92 %.      "

## 2022-07-26 NOTE — CONSULTS
[facility] Nurse Inpatient Wound Assessment   Reason for consultation: Evaluate and treat  Right arm skin tears    Assessment  Right arm skin tears due to frequent falls  Status: initial assessment  Superficial, excellent management via nursing staff with standard skin tear and pressure injury prevention protocol     Treatment Plan  Skin tear protocol: R) arm and any other future superficial skin tears every 4 days and PRN  1. Cleanse wound with NS or wound cleanser  2. Dry and protect surrounding skin with no sting barrier film wipe #[id number]  3. Cover with silicone foam dressing (e.g. Mepilex 4x4 #[id number])    Pressure Injury prevention (RN-please order supplies if not in room)  1. Turn every 2 hours, side to side avoid supine on pressure redistribution support surface (standard unit mattress)  2.   Float heels off bed with use of pillows under legs, if ineffective, order offloading boots: Heel Lift boots (Prevalon #[id number])  3.   If incontinent Cleanse with incontinent cleanser (Karen spray # [id number]) followed by skin barrier protectant (Critic Aid paste #23719)  BID and after each incontinent episode  4.   Prevent sliding and shear by limiting HOB to 30 degrees or less unless contraindicated, use knee gatch first if not contraindicated  5.   If sitting, order from  and use use pressure redistribution chair cushion (#274227) if unable to shift weight every hour, please limit sitting to an hour at a time  6.   Protective foam dressings for vulnerable intact skin PRN,Change at least q 4 days, peel back, peek and replace for daily skin inspection.  7.   Optimize nutrition     Orders Written  Recommended provider order: None, at this time  [facility] Nurse follow-up plan:signing off Nurses can continue and follow the protocols they put in place and  Nursing to notify the Provider(s) and re-consult the [facility] Nurse if wound(s) deteriorates or new skin concern.    Patient History  According to provider note(s): 89 year old male with a past  "medical history including COPD; AS s/p AVR; AF; AAA; h/o lung cancer s/p resection; h/o NHL s/p chemotherapy; h/o DVT/PE; who underwent elective R TKA     Objective Data     Allergies   Allergen Reactions     Lidocaine Blisters     Allergy to lidocaine ointment     Omeprazole Itching     Pantoprazole Itching     Prevacid [Lansoprazole] Itching     Lasix [Furosemide] Rash     Penicillins Rash     \"broke out from injection\" 60 yrs ago  Tolerates cephalosporins     Suazo Catheter: Not present    Containment of urine/stool: Incontinence Protocol    Active Diet Order  Orders Placed This Encounter      Regular Diet Adult      Low Saturated Fat Na <2400 mg      Output:   I/O last 3 completed shifts:  In: 940 [P.O.:240; I.V.:700]  Out: 485 [Urine:200; Drains:275; Blood:10]    WOC Nurse Pressure injury Risk Assessment Note:  Sensory Perception: 3-->slightly limited  Moisture: 3-->rarely moist according to RN however, patient is incontinent  Activity: 2-->chairbound  Mobility: 2-->very limited  Nutrition: 2-->probably inadequate-RN documented poor appitite  Friction and Shear: 2-->potential problem  Jamin Score: 14                Labs:   Recent Labs   Lab 22  0703   HGB 10.4*     Temp (24hrs), Av.2  F (36.2  C), Min:96  F (35.6  C), Max:97.6  F (36.4  C)  Physical Exam  Areas of skin assessed: focused R) arm    Wound Location:  R) arm  Date of last photo 2022  Wound History: falls, ecchymotic thin skin   Wound Base: 0.25 cm superficial intact scab      Palpation of the wound bed: firm (scab)     Drainage: scant dried onto dressing     Description of drainage: yellow     Tunneling N/A     Undermining N/A  Periwound skin: ecchymosis, superficial erosion and evidence of previously healed skin injury      Color: normal and consistent with surrounding tissue      Temperature: normal   Odor: none  Pain: denies ,     Interventions  Visual inspection and assessment completed   Wound Care Rationale Provide " protection   Wound Care: done per plan of care  Supplies: floor stock   Current off-loading measures (according to RN, patient in chair currently):  1. Turn every 2 hours, side to side positioning with pillows   2. Float heels off bed with use of pillows under legs    Current support surface: Standard  Atmos Air mattress  Education provided to: plan of care and wound progress  Discussed plan of care with Patient and Nurse    Jayashree Crum MS, RN CWOCN    Dept. Pager: [phone]  Dept. Office Number: [phone]

## 2022-07-26 NOTE — PLAN OF CARE
Goal Outcome Evaluation:    POD#0 Right TKA.  A&Ox4.  Very [place].  VSS, O2sats-low to mid 90s on 3LO2.  Afebrile.  LS-diminished.  Dressing to RLE-CDI, ice packs applied to RLE.  CMS intact.  3/5 strength in RLE.  Hemovac intact and patent.  Tolerating Low saturated fat Na, 2400 mg diet.  On 0.9%NS infusing @ 100 ml/hr.  On intermittent IV ABX.  Received scheduled Vistaril; received Tramadol x1 prn pain.  Seen by PT this shift.  Mepilex to skin tears on BUE; WOC RN consulted.  Bladderscanned for 273 ml this shift.  Visited by wife this evening.

## 2022-07-27 ENCOUNTER — APPOINTMENT (OUTPATIENT)
Dept: GENERAL RADIOLOGY | Facility: CLINIC | Age: 87
DRG: 469 | End: 2022-07-27
Attending: INTERNAL MEDICINE
Payer: COMMERCIAL

## 2022-07-27 ENCOUNTER — APPOINTMENT (OUTPATIENT)
Dept: CARDIOLOGY | Facility: CLINIC | Age: 87
DRG: 469 | End: 2022-07-27
Attending: INTERNAL MEDICINE
Payer: COMMERCIAL

## 2022-07-27 ENCOUNTER — APPOINTMENT (OUTPATIENT)
Dept: PHYSICAL THERAPY | Facility: CLINIC | Age: 87
DRG: 469 | End: 2022-07-27
Attending: ORTHOPAEDIC SURGERY
Payer: COMMERCIAL

## 2022-07-27 LAB
ANION GAP SERPL CALCULATED.3IONS-SCNC: 7 MMOL/L (ref 3–14)
BASOPHILS # BLD AUTO: 0 10E3/UL (ref 0–0.2)
BASOPHILS NFR BLD AUTO: 0 %
BUN SERPL-MCNC: 37 MG/DL (ref 7–30)
CALCIUM SERPL-MCNC: 7.8 MG/DL (ref 8.5–10.1)
CHLORIDE BLD-SCNC: 112 MMOL/L (ref 94–109)
CO2 SERPL-SCNC: 22 MMOL/L (ref 20–32)
CREAT SERPL-MCNC: 1.67 MG/DL (ref 0.66–1.25)
CREAT UR-MCNC: 185 MG/DL
EOSINOPHIL # BLD AUTO: 0.5 10E3/UL (ref 0–0.7)
EOSINOPHIL NFR BLD AUTO: 4 %
ERYTHROCYTE [DISTWIDTH] IN BLOOD BY AUTOMATED COUNT: 15.8 % (ref 10–15)
FRACT EXCRET NA UR+SERPL-RTO: 0.1 %
GFR SERPL CREATININE-BSD FRML MDRD: 39 ML/MIN/1.73M2
GLUCOSE BLD-MCNC: 105 MG/DL (ref 70–99)
HCT VFR BLD AUTO: 32 % (ref 40–53)
HGB BLD-MCNC: 9.5 G/DL (ref 13.3–17.7)
IMM GRANULOCYTES # BLD: 0 10E3/UL
IMM GRANULOCYTES NFR BLD: 0 %
LVEF ECHO: NORMAL
LYMPHOCYTES # BLD AUTO: 0.7 10E3/UL (ref 0.8–5.3)
LYMPHOCYTES NFR BLD AUTO: 5 %
MCH RBC QN AUTO: 27.4 PG (ref 26.5–33)
MCHC RBC AUTO-ENTMCNC: 29.7 G/DL (ref 31.5–36.5)
MCV RBC AUTO: 92 FL (ref 78–100)
MONOCYTES # BLD AUTO: 2 10E3/UL (ref 0–1.3)
MONOCYTES NFR BLD AUTO: 15 %
NEUTROPHILS # BLD AUTO: 9.8 10E3/UL (ref 1.6–8.3)
NEUTROPHILS NFR BLD AUTO: 76 %
NRBC # BLD AUTO: 0 10E3/UL
NRBC BLD AUTO-RTO: 0 /100
PLATELET # BLD AUTO: 140 10E3/UL (ref 150–450)
POTASSIUM BLD-SCNC: 4.7 MMOL/L (ref 3.4–5.3)
RBC # BLD AUTO: 3.47 10E6/UL (ref 4.4–5.9)
SODIUM SERPL-SCNC: 141 MMOL/L (ref 133–144)
SODIUM UR-SCNC: 21 MMOL/L
WBC # BLD AUTO: 13.1 10E3/UL (ref 4–11)

## 2022-07-27 PROCEDURE — 82310 ASSAY OF CALCIUM: CPT | Performed by: INTERNAL MEDICINE

## 2022-07-27 PROCEDURE — 250N000013 HC RX MED GY IP 250 OP 250 PS 637: Performed by: ORTHOPAEDIC SURGERY

## 2022-07-27 PROCEDURE — 84300 ASSAY OF URINE SODIUM: CPT | Performed by: INTERNAL MEDICINE

## 2022-07-27 PROCEDURE — 85025 COMPLETE CBC W/AUTO DIFF WBC: CPT | Performed by: INTERNAL MEDICINE

## 2022-07-27 PROCEDURE — 97110 THERAPEUTIC EXERCISES: CPT | Mod: GP

## 2022-07-27 PROCEDURE — 71045 X-RAY EXAM CHEST 1 VIEW: CPT

## 2022-07-27 PROCEDURE — 97530 THERAPEUTIC ACTIVITIES: CPT | Mod: GP

## 2022-07-27 PROCEDURE — 99233 SBSQ HOSP IP/OBS HIGH 50: CPT | Performed by: INTERNAL MEDICINE

## 2022-07-27 PROCEDURE — 93306 TTE W/DOPPLER COMPLETE: CPT

## 2022-07-27 PROCEDURE — 93005 ELECTROCARDIOGRAM TRACING: CPT

## 2022-07-27 PROCEDURE — 120N000001 HC R&B MED SURG/OB

## 2022-07-27 PROCEDURE — 258N000003 HC RX IP 258 OP 636: Performed by: INTERNAL MEDICINE

## 2022-07-27 PROCEDURE — 93306 TTE W/DOPPLER COMPLETE: CPT | Mod: 26 | Performed by: INTERNAL MEDICINE

## 2022-07-27 PROCEDURE — 36415 COLL VENOUS BLD VENIPUNCTURE: CPT | Performed by: INTERNAL MEDICINE

## 2022-07-27 RX ADMIN — POLYETHYLENE GLYCOL 3350 17 G: 17 POWDER, FOR SOLUTION ORAL at 08:46

## 2022-07-27 RX ADMIN — SODIUM CHLORIDE: 9 INJECTION, SOLUTION INTRAVENOUS at 00:59

## 2022-07-27 RX ADMIN — ACETAMINOPHEN 975 MG: 325 TABLET ORAL at 15:09

## 2022-07-27 RX ADMIN — GABAPENTIN 600 MG: 300 CAPSULE ORAL at 20:28

## 2022-07-27 RX ADMIN — SENNOSIDES AND DOCUSATE SODIUM 1 TABLET: 50; 8.6 TABLET ORAL at 08:46

## 2022-07-27 RX ADMIN — SENNOSIDES AND DOCUSATE SODIUM 1 TABLET: 50; 8.6 TABLET ORAL at 20:28

## 2022-07-27 RX ADMIN — HYDROXYZINE PAMOATE 50 MG: 25 CAPSULE ORAL at 08:46

## 2022-07-27 RX ADMIN — DULOXETINE 60 MG: 60 CAPSULE, DELAYED RELEASE ORAL at 08:46

## 2022-07-27 RX ADMIN — ACETAMINOPHEN 975 MG: 325 TABLET ORAL at 08:46

## 2022-07-27 RX ADMIN — ACETAMINOPHEN 975 MG: 325 TABLET ORAL at 01:13

## 2022-07-27 RX ADMIN — FAMOTIDINE 40 MG: 20 TABLET ORAL at 08:46

## 2022-07-27 RX ADMIN — FERROUS SULFATE TAB 325 MG (65 MG ELEMENTAL FE) 325 MG: 325 (65 FE) TAB at 20:28

## 2022-07-27 RX ADMIN — ATORVASTATIN CALCIUM 10 MG: 10 TABLET, FILM COATED ORAL at 08:46

## 2022-07-27 RX ADMIN — FERROUS SULFATE TAB 325 MG (65 MG ELEMENTAL FE) 325 MG: 325 (65 FE) TAB at 08:46

## 2022-07-27 RX ADMIN — APIXABAN 5 MG: 5 TABLET, FILM COATED ORAL at 08:46

## 2022-07-27 ASSESSMENT — ACTIVITIES OF DAILY LIVING (ADL)
ADLS_ACUITY_SCORE: 40
ADLS_ACUITY_SCORE: 37
ADLS_ACUITY_SCORE: 40
ADLS_ACUITY_SCORE: 40

## 2022-07-27 NOTE — BRIEF OP NOTE
[facility]    Brief Operative Note    Pre-operative diagnosis: Osteoarthritis of right knee [M17.11]  Post-operative diagnosis Same as pre-operative diagnosis    Procedure: Procedure(s):  RIGHT TOTAL KNEE ARTHROPLASTY  Surgeon: Surgeon(s) and Role:     * Giuliano Deshpande MD - Primary     MIGUEL lucero  Anesthesia: Choice   Estimated Blood Loss: 10 mL from 7/25/2022 10:21 AM to 7/25/2022 12:30 PM      Drains: Hemovac  Specimens:   ID Type Source Tests Collected by Time Destination   A : right knee bone disposed per policy and exclusion list Bone Fragments Bone OR DOCUMENTATION ONLY Giuliano Deshpande MD 7/25/2022 12:19 PM      Findings:   None.  Complications: None.  Implants:   Implant Name Type Inv. Item Serial No.  Lot No. LRB No. Used Action   BONE CEMENT RADIOPAQUE SIMPLEX HV FULL DOSE 6194-1-001 - [id number] Cement, Bone BONE CEMENT RADIOPAQUE SIMPLEX HV FULL DOSE 6194-1-001  [employer] [id number] Right 1 Implanted   BONE CEMENT RADIOPAQUE SIMPLEX HV FULL DOSE 6194-1-001 - [id number] Cement, Bone BONE CEMENT RADIOPAQUE SIMPLEX HV FULL DOSE 6194-1-001  [employer] [id number] Right 1 Implanted   IMP COMP FEMORAL VANGARD BIOM FIXATION 002118 - [id number] Total Joint Component/Insert IMP COMP FEMORAL VANGARD BIOM FIXATION 213309  [employer] [id number] Right 1 Implanted   IMP COMP FEMORAL VANGARD BIOM PS RT 72.5MM 486483 - [id number] Total Joint Component/Insert IMP COMP FEMORAL VANGARD BIOM PS RT 72.5MM 335481  [employer] [id number] Right 1 Implanted   IMP COMP PATELLA BIOM 3 PEG 37MM STD 117913 - [id number] Total Joint Component/Insert IMP COMP PATELLA BIOM 3 PEG 37MM STD 423854  [employer] [id number] Right 1 Implanted   IMP COMP TIBIAL KNEE ASCENT 79MM 783339 - [id number] Total Joint Component/Insert IMP COMP TIBIAL KNEE ASCENT 79MM 116353  [employer] [id number] Right 1 Implanted   IMP INSERT TIBIAL BIOM PS PLUS BEARING 14X79/83MM 060173 -  [id number] Total Joint Component/Insert IMP INSERT TIBIAL BIOM PS PLUS BEARING 14X79/83MM [id number]  [employer] [id number] Right 1 Implanted

## 2022-07-27 NOTE — PROGRESS NOTES
A/O x2. VSS on 4 lpm NC except BP. Up with lift. Dressing CDI. PIV infusing. Pain managed with scheduled Tylenol.  Voiding per urinal. Slept most of the shift. No hallucinations noted. Will continue to monitor.

## 2022-07-27 NOTE — PLAN OF CARE
Goal Outcome Evaluation:  Pt A&Ox3, disoriented to time and occasionally forgetful. CMS intact. VSS, able to wean to RA. Up w/ A2 pivot to chair. Taking tylenol for pain. Voiding adequately in urinal/incontinence at times. Continue to monitor.

## 2022-07-27 NOTE — PLAN OF CARE
Goal Outcome Evaluation:  Patient vital signs are at baseline: Yes  Patient able to ambulate as they were prior to admission or with assist devices provided by therapies during their stay:  No,  Reason:  confusion  Patient MUST void prior to discharge:  Yes  Patient able to tolerate oral intake:  Yes  Pain has adequate pain control using Oral analgesics:  Yes  Does patient have an identified :  n0  Has goal D/C date and time been discussed with patient:  No,  Reason:  confusion.  Occasionally able to ask appropriate questions, othertimes, sees bugs on the walls, hears someone at the door. Requires frequent monitoring.

## 2022-07-27 NOTE — PROGRESS NOTES
[facility]    Internal Medicine Hospitalist Progress Note  07/27/2022  I evaluated patient on the above date.    Leland Trinh Jr., MD  [phone] (p)  Text Page  Vocera        Assessment & Plan New actions/orders today (07/27/2022) are underlined.    Hermilo Velasquez is a very pleasant 89 year old male with a past medical history including COPD; AS s/p AVR; AF; AAA; h/o lung cancer s/p resection; h/o NHL s/p chemotherapy; h/o DVT/PE; who underwent elective R /2022. IM Hospitalist service consulted for medical comanagement.    Status post right total knee arthroplasty 7/25/2022.  * Tramadol stopped 7/26 given oversedation.  - Post-op management per Ortho.  - Continue acetaminophen 975 mg q8h; PRN acetaminophen; minimize opioids as able.  - Continue apixaban at lower dose per Ortho.  - Continue PT, OT.    Hypotension with AMS, possibly toxic encephalopathy.  * Afternoon 7/26, pt noted to be drowsy/lethargic with BP's 70's (had received tramadol earlier in the morning). Tramadol stopped. Given IVF's with improvement in BP's to 90's systolic.  * 7/27: BP's stable in 90's.  - Monitor BP's.    Hypoxia.  * On 7/27, pt noted to be hypoxic, needing 5L O2. No chest pain complaints.  - Continue O2, wean as able.  - Check ECG.  - Check CXR.  - Check echocardiogram.    Blood loss anemia on chronic iron deficiency anemia.  * Hx PATIENCE and occult GI bleeding. Hgb as low as 6.8 on 4/11/2022 requiring transfusion. EGD 4/12/2022 showed erosive gastropathy but no stigmata of bleed. Colonoscopy 4/12/2022 showed diverticulosis and multiple AVM but no source of bleed, they were treated. Since then he has been primarily 9-10 range. Received Iron infusions. Most recent Hgb 12.1 6/27/2022.  * Hgb 10.4 on 7/26.  Recent Labs   Lab 07/27/22  0719 07/26/22  0703   HGB 9.5* 10.4*   - Monitor CBC.  - Consider prbc transfusion if hgb </= 7.0 or if significant bleeding with hemodynamic instability or if  symptomatic.    Report of skin tears s/p recent fall.  Frequent falls.  * Of note, was admitted in 5/2022 with right knee pain s/p fall, with right knee effusion and hemarthrosis in setting of DOAC.   - Continue local wound cares per [facility] to evaluate skin tears    Chronic atrial fibrillation on chronic anticoagulation.  * PTA on apixaban. On 7/25, noted that pt had been on low dose metoprolol XL, taken off med list, however did not seen in any notes that this was to be discontinued. It was d/w pt's wife and she [facility], unclear of the dose. Started on metoprolol XL 12.5 mg daily 7/26.  - Continue to monitor on telemetry.  - Continue metoprolol XL 12.5 mg daily.  - Continue apixaban at lower dose per Ortho.  - Continue PRN IV metoprolol for sustained heart rate >120.  - Monitor K+/Mg++, replace PRN.    COPD.  - Continue fluticasone-vilanterol; PRN albuterol.  - Monitor pulse oximetry/capnography during this hospitalization per protocol.    FRED on cronic kidney disease, stage 3a.  * Baseline Cr 1.1-1.3.   * Cr on admission, 1.33.  * Issues with low BP's 7/26, given IVF's.  * Cr increased to 1.67 7/27.  Recent Labs   Lab 07/27/22  0719 07/26/22  0703 07/25/22  1524   CR 1.67* 1.21 1.33*  1.33*   - Stop IVF's for now given concerns for CHF.  - Monitor BMP.  - Avoid nephrotoxic medications.    Probably early dementia /cognitive decline.  * On 7/25, noted that no hx dementia/cognitive impairment, but clearly has cognitive deficits versus is just very hard of hearing during prior admissions.   - Re-orient as needed.  - Maintain normal day/night, sleep/wake cycles.  - Minimize sedating medications as able.  - Patient could possibly benefit from getting neurocognitive evaluation in an outpatient setting if not already done.    Aortic stenosis s/p TAVR with bioprosthetic valve 2015.  * Coronary angiogram at that time was negative for obstructive CAD. TTE 4/2021 showed preserved LVEF with mild  to moderate prosthetic valve stenosis.   - Monitor clinically.    Chronic peripheral neuropathy.  - Continue PTA gabapentin 600 mg at bedtime.    Dyslipidemia.  * Chronic and stable.  - Continue atorvastatin.    GERD.  * Chronic and stable.  - Continue famotidine.    Bullous pemphigoid and chronic pruritus.  - Continue hydroxyzine.    MGUS.  History of stage 4 Non-Hodgkins Lymphoma in remission  * Followed by Dr. Zurita of [facility]. His lymphoma was diagnosed and treated with systemic chemotherapy in 2012 and reportedly has been in remission since. His MGUS is being monitored as an outpatient.  - Follow-up outpatient.    AAA.  * CT in 4/2022 showed a 3.5 cm saccular infrarenal abdominal aortic aneurysm.  - Monitor outpatient.    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  - Continue apixaban.    History of lung cancer status post wedge resection 2019.  - Noted.      Clinically Significant Risk Factors Present on Admission         # Acute Kidney Injury, unspecified: based on a >150% or 0.3 mg/dL increase in creatinine on admission compared to past 90 day average, will monitor renal function                COVID-19 testing.  COVID-19 PCR Results    COVID-19 PCR Results 8/15/20 9/13/20 9/13/20 10/23/20 10/23/20 4/13/21 3/5/22 4/10/22 5/8/22 7/22/22     1256 1256 1702 1702        COVID-19 Virus PCR to [facility] - Result Not Detected Test received-See reflex to IDDL test SARS CoV2 (COVID-19) Virus RT-PCR  Test received-See reflex to IDDL test SARS CoV2 (COVID-19) Virus RT-PCR         COVID-19 Virus PCR to [facility] - Source Nasopharyngeal Nasopharyngeal  Nasopharyngeal         COVID-19 Virus by PCR (External Result)       Not Detected      SARS-CoV-2 Virus Specimen Source   Nasopharyngeal  Nasopharyngeal        Flu A/B & SARS-COV-2 PCR Source      Nasopharyngeal       SARS-CoV-2 PCR Result   NEGATIVE  NEGATIVE NEGATIVE       SARS CoV2 PCR        Negative Negative Negative     "  Comments are available for some flowsheets but are not being displayed.         COVID-19 Antibody Results, Testing for Immunity    COVID-19 Antibody Results, Testing for Immunity   No data to display.             Diet: Regular Diet Adult  Low Saturated Fat Na <2400 mg  Diet    Prophylaxis: PCD's, ambulation. On apixaban.  Suazo Catheter: Not present  Central Lines: None  Code Status: Full Code    Disposition Plan   Expected discharge: 1-2d recommended to prior living arrangement per Ortho.  Entered: Leland Trinh MD 07/27/2022, 10:31 AM         Interval History   Awake, alert, delay in responses, but overall improved from yesterday.    -Data reviewed today: I reviewed all new labs and imaging over the last 24 hours. I personally reviewed no images or EKG's today.    Physical Exam    , Blood pressure 92/48, pulse 71, temperature 97.4  F (36.3  C), temperature source Axillary, resp. rate 18, height 1.778 m (5' 10\"), weight 75.8 kg (167 lb 1.7 oz), SpO2 98 %. O2 Device: Nasal cannula Oxygen Delivery: 4 LPM  Vitals:    07/25/22 0854 07/26/22 0201   Weight: 70.4 kg (155 lb 4.8 oz) 75.8 kg (167 lb 1.7 oz)     Vital Signs with Ranges  Temp:  [97.3  F (36.3  C)-97.5  F (36.4  C)] 97.4  F (36.3  C)  Pulse:  [49-88] 71  Resp:  [16-20] 18  BP: ()/(40-74) 92/48  SpO2:  [78 %-98 %] 98 %  Patient Vitals for the past 24 hrs:   BP Temp Temp src Pulse Resp SpO2   07/27/22 0755 92/48 97.4  F (36.3  C) Axillary 71 18 98 %   07/27/22 0100 (!) 87/46 -- -- 68 18 94 %   07/26/22 1940 (!) 81/46 97.5  F (36.4  C) Axillary 68 16 97 %   07/26/22 1650 92/53 -- -- 84 16 95 %   07/26/22 1615 95/56 -- -- 83 18 95 %   07/26/22 1600 (!) 85/52 -- -- 85 20 94 %   07/26/22 1545 (!) 87/45 -- -- 59 -- 90 %   07/26/22 1533 (!) 77/43 -- -- -- 18 (!) 78 %   07/26/22 1530 (!) 67/40 -- -- (!) 49 20 (!) 80 %   07/26/22 1145 133/74 97.3  F (36.3  C) Oral 88 18 92 %     I/O's Last 24 hours  I/O last 3 completed shifts:  In: 2078 [P.O.:730; " I.V.:848; IV Piggyback:500]  Out: 600 [Urine:600]    Constitutional: Awake, alert, delay in responses, but overall improved from yesterday afternoon.  Respiratory: Diminished in bases. Crackles in right base.  Cardiovascular: RRR, no m/r/g.  GI: Soft, nt, nd, +BS.  Skin/Integumen: Trace leg edema.  Other:        Data   Recent Labs   Lab 07/27/22 0719 07/26/22  0703 07/26/22  0158 07/25/22 2321 07/25/22  1524   WBC 13.1*  --   --   --   --    HGB 9.5* 10.4*  --   --   --    MCV 92  --   --   --   --    *  --   --   --   --     142  --   --  141   POTASSIUM 4.7 4.5  --   --  3.8   CHLORIDE 112* 111*  --   --  110*   CO2 22 24  --   --  26   BUN 37* 24  --   --  26   CR 1.67* 1.21  --   --  1.33*  1.33*   ANIONGAP 7 7  --   --  5   AMRITA 7.8* 8.1*  --   --  8.7   * 105*  105* 108*   < > 103*    < > = values in this interval not displayed.     Recent Labs   Lab Test 07/27/22 0719 07/26/22 0703 07/26/22 0158 07/25/22 2321 07/25/22  1524 09/15/20  0735 09/15/20  0200 03/29/20  0611 03/29/20  0157 03/25/20  0740 03/24/20  2206 03/24/20  1826 03/23/20  0459 03/23/20  0431   * 105*  105* 108* 115* 103*   < >  --    < >  --    < >  --   --    < >  --    BGM  --   --   --   --   --   --  163*  --  103*  --  120* 121*  --  131*    < > = values in this interval not displayed.     Recent Labs   Lab 07/27/22  0719 07/26/22  1024   WBC 13.1*  --    LACT  --  1.8         No results found for this or any previous visit (from the past 24 hour(s)).    Medications   All medications were reviewed.    sodium chloride 75 mL/hr at 07/27/22 0059       acetaminophen  975 mg Oral Q8H     apixaban ANTICOAGULANT  5 mg Oral Daily     atorvastatin  10 mg Oral Daily     DULoxetine  60 mg Oral Daily     famotidine  40 mg Oral Daily     ferrous sulfate  325 mg Oral BID     fluticasone-vilanterol  1 puff Inhalation Daily     gabapentin  600 mg Oral At Bedtime     hydrOXYzine  50 mg Oral Daily     polyethylene glycol   17 g Oral Daily     senna-docusate  1 tablet Oral BID     [START ON 7/28/2022] acetaminophen, albuterol, albuterol, sore throat lozenge, bisacodyl, dimenhyDRINATE, hydrALAZINE, lidocaine 4%, lidocaine (buffered or not buffered), magnesium hydroxide, metoprolol, naloxone **OR** naloxone **OR** naloxone **OR** naloxone, ondansetron **OR** ondansetron, prochlorperazine **OR** prochlorperazine, sodium chloride (PF)

## 2022-07-27 NOTE — PROGRESS NOTES
Ortho     Answers qyuestions  Appropriately    He is  Very  Hard of hearing    His  Wife  Will bring in  His  Electronic  Magnifier and  Head phones    Will be  Ready  For  Tcu  Tomorrow    Push po  Fluids    No  Narcs,  Sedative  meds  Etc.    Will seee in  Office  At about  3  Weeks post op.       FLORA ROSADO  Will be  Ready  For  Thursday   He  Prefers mas[facility]  As its  Close to his   Home  For his wufe  To tavel to.

## 2022-07-27 NOTE — PROGRESS NOTES
Care Management Follow Up    Length of Stay (days): 2    Expected Discharge Date: 07/28/2022     Concerns to be Addressed:       Patient plan of care discussed at interdisciplinary rounds: Yes    Anticipated Discharge Disposition: Skilled Nursing Facility, Transitional Care     Anticipated Discharge Services: None  Anticipated Discharge DME: None    Patient/family educated on Medicare website which has current facility and service quality ratings: yes  Education Provided on the Discharge Plan:    Patient/Family in Agreement with the Plan: yes    Referrals Placed by CM/SW:    Private pay costs discussed: Not applicable    Additional Information:    Cheryl confirmed with [facility] that pt has been accepted into a shared, semi--private room and [facility] confirmed pt has been accepted into either a shared or private rm ($45/day).  Sw went to speak with pt about acceptance at both [facility] and [facility].  Pt had difficulty hearing, Sw called spouse to go over options while in pt's room.  Spouse selected [facility] in the semi-private room.  Spouse stated that she would like to transport pt if possible.  Cheryl discussed this with RN and per RN medical transport is the safest option given the amount of assistance pt needs.  Cheryl explained this to spouse along with private pay costs; spouse is agreeable to medical transport arrangement.  Per RN pt is not ready for discharge today but possibly tomorrow.  Cheryl updated [facility] that pt has accepted room with a likely discharge tomorrow, 7/28/22.  [facility] is able to admit pt anytime tomorrow.  Cheryl updated [facility] on pt's discharge plan.  Sw to continue to follow for discharge planning needs.    Update:  Cheryl confirmed at rounds to coordinate discharge time early tomorrow if possible.  Cheryl scheduled [facility] w/c transport ride for pt to [facility] tomorrow, 7/28/22, at 1100.  Cheryl left vm with Selina at [facility] on discharge date/time.    PAS-RR    D: Per DHS regulation, CHERYL completed and submitted PAS-RR to [place]  [employer] via the Senior LinkAge Line.  PAS-RR confirmation # is : [id number]    I: SW spoke with pt and they are aware a PAS-RR has been submitted.  SW reviewed with pt that they may be contacted for a follow up appointment within 10 days of hospital discharge if their SNF stay is < 30 days.  Contact information for Senior Link[employer] Line was also provided.    A: pt verbalized understanding.    P: Further questions may be directed to Senior LinkAge Line at #[phone], option #4 for PAS-RR staff.      Queta Rubalcava, LICSW

## 2022-07-28 ENCOUNTER — APPOINTMENT (OUTPATIENT)
Dept: ULTRASOUND IMAGING | Facility: CLINIC | Age: 87
DRG: 469 | End: 2022-07-28
Attending: INTERNAL MEDICINE
Payer: COMMERCIAL

## 2022-07-28 ENCOUNTER — APPOINTMENT (OUTPATIENT)
Dept: CT IMAGING | Facility: CLINIC | Age: 87
DRG: 469 | End: 2022-07-28
Attending: INTERNAL MEDICINE
Payer: COMMERCIAL

## 2022-07-28 LAB
ANION GAP SERPL CALCULATED.3IONS-SCNC: 5 MMOL/L (ref 3–14)
BASOPHILS # BLD AUTO: 0 10E3/UL (ref 0–0.2)
BASOPHILS # BLD AUTO: 0 10E3/UL (ref 0–0.2)
BASOPHILS NFR BLD AUTO: 0 %
BASOPHILS NFR BLD AUTO: 0 %
BUN SERPL-MCNC: 41 MG/DL (ref 7–30)
CALCIUM SERPL-MCNC: 8.5 MG/DL (ref 8.5–10.1)
CHLORIDE BLD-SCNC: 111 MMOL/L (ref 94–109)
CO2 SERPL-SCNC: 23 MMOL/L (ref 20–32)
CREAT SERPL-MCNC: 1.31 MG/DL (ref 0.66–1.25)
EOSINOPHIL # BLD AUTO: 1 10E3/UL (ref 0–0.7)
EOSINOPHIL # BLD AUTO: 1.3 10E3/UL (ref 0–0.7)
EOSINOPHIL NFR BLD AUTO: 8 %
EOSINOPHIL NFR BLD AUTO: 8 %
ERYTHROCYTE [DISTWIDTH] IN BLOOD BY AUTOMATED COUNT: 15.9 % (ref 10–15)
ERYTHROCYTE [DISTWIDTH] IN BLOOD BY AUTOMATED COUNT: 15.9 % (ref 10–15)
GFR SERPL CREATININE-BSD FRML MDRD: 52 ML/MIN/1.73M2
GLUCOSE BLD-MCNC: 114 MG/DL (ref 70–99)
HCT VFR BLD AUTO: 30.6 % (ref 40–53)
HCT VFR BLD AUTO: 31.6 % (ref 40–53)
HGB BLD-MCNC: 9.5 G/DL (ref 13.3–17.7)
HGB BLD-MCNC: 9.9 G/DL (ref 13.3–17.7)
IMM GRANULOCYTES # BLD: 0 10E3/UL
IMM GRANULOCYTES # BLD: 0.1 10E3/UL
IMM GRANULOCYTES NFR BLD: 0 %
IMM GRANULOCYTES NFR BLD: 1 %
LACTATE SERPL-SCNC: 0.9 MMOL/L (ref 0.7–2)
LYMPHOCYTES # BLD AUTO: 0.6 10E3/UL (ref 0.8–5.3)
LYMPHOCYTES # BLD AUTO: 0.6 10E3/UL (ref 0.8–5.3)
LYMPHOCYTES NFR BLD AUTO: 4 %
LYMPHOCYTES NFR BLD AUTO: 5 %
MCH RBC QN AUTO: 27.6 PG (ref 26.5–33)
MCH RBC QN AUTO: 27.7 PG (ref 26.5–33)
MCHC RBC AUTO-ENTMCNC: 31 G/DL (ref 31.5–36.5)
MCHC RBC AUTO-ENTMCNC: 31.3 G/DL (ref 31.5–36.5)
MCV RBC AUTO: 88 FL (ref 78–100)
MCV RBC AUTO: 89 FL (ref 78–100)
MONOCYTES # BLD AUTO: 1.1 10E3/UL (ref 0–1.3)
MONOCYTES # BLD AUTO: 1.6 10E3/UL (ref 0–1.3)
MONOCYTES NFR BLD AUTO: 11 %
MONOCYTES NFR BLD AUTO: 9 %
NEUTROPHILS # BLD AUTO: 10.2 10E3/UL (ref 1.6–8.3)
NEUTROPHILS # BLD AUTO: 11.5 10E3/UL (ref 1.6–8.3)
NEUTROPHILS NFR BLD AUTO: 76 %
NEUTROPHILS NFR BLD AUTO: 78 %
NRBC # BLD AUTO: 0 10E3/UL
NRBC # BLD AUTO: 0 10E3/UL
NRBC BLD AUTO-RTO: 0 /100
NRBC BLD AUTO-RTO: 0 /100
NT-PROBNP SERPL-MCNC: ABNORMAL PG/ML (ref 0–1800)
PLATELET # BLD AUTO: 150 10E3/UL (ref 150–450)
PLATELET # BLD AUTO: 151 10E3/UL (ref 150–450)
POTASSIUM BLD-SCNC: 3.8 MMOL/L (ref 3.4–5.3)
RBC # BLD AUTO: 3.43 10E6/UL (ref 4.4–5.9)
RBC # BLD AUTO: 3.59 10E6/UL (ref 4.4–5.9)
SODIUM SERPL-SCNC: 139 MMOL/L (ref 133–144)
TROPONIN I SERPL HS-MCNC: 339 NG/L
WBC # BLD AUTO: 12.9 10E3/UL (ref 4–11)
WBC # BLD AUTO: 15.1 10E3/UL (ref 4–11)

## 2022-07-28 PROCEDURE — 93971 EXTREMITY STUDY: CPT | Mod: RT

## 2022-07-28 PROCEDURE — 84484 ASSAY OF TROPONIN QUANT: CPT | Performed by: INTERNAL MEDICINE

## 2022-07-28 PROCEDURE — 83605 ASSAY OF LACTIC ACID: CPT | Performed by: INTERNAL MEDICINE

## 2022-07-28 PROCEDURE — 258N000003 HC RX IP 258 OP 636: Performed by: INTERNAL MEDICINE

## 2022-07-28 PROCEDURE — 85025 COMPLETE CBC W/AUTO DIFF WBC: CPT | Performed by: INTERNAL MEDICINE

## 2022-07-28 PROCEDURE — 36415 COLL VENOUS BLD VENIPUNCTURE: CPT | Performed by: INTERNAL MEDICINE

## 2022-07-28 PROCEDURE — 99233 SBSQ HOSP IP/OBS HIGH 50: CPT | Performed by: INTERNAL MEDICINE

## 2022-07-28 PROCEDURE — 999N000127 HC STATISTIC PERIPHERAL IV START W US GUIDANCE

## 2022-07-28 PROCEDURE — 250N000013 HC RX MED GY IP 250 OP 250 PS 637: Performed by: ORTHOPAEDIC SURGERY

## 2022-07-28 PROCEDURE — 120N000001 HC R&B MED SURG/OB

## 2022-07-28 PROCEDURE — 71275 CT ANGIOGRAPHY CHEST: CPT

## 2022-07-28 PROCEDURE — 250N000011 HC RX IP 250 OP 636: Performed by: INTERNAL MEDICINE

## 2022-07-28 PROCEDURE — 36569 INSJ PICC 5 YR+ W/O IMAGING: CPT

## 2022-07-28 PROCEDURE — 80048 BASIC METABOLIC PNL TOTAL CA: CPT | Performed by: INTERNAL MEDICINE

## 2022-07-28 PROCEDURE — 83880 ASSAY OF NATRIURETIC PEPTIDE: CPT | Performed by: INTERNAL MEDICINE

## 2022-07-28 PROCEDURE — 99222 1ST HOSP IP/OBS MODERATE 55: CPT | Performed by: INTERNAL MEDICINE

## 2022-07-28 PROCEDURE — 272N000433 HC KIT CATH IV 18 OR 20G CM, POWERGLIDE W MAX BARRIER

## 2022-07-28 PROCEDURE — 250N000011 HC RX IP 250 OP 636: Performed by: ORTHOPAEDIC SURGERY

## 2022-07-28 PROCEDURE — 250N000009 HC RX 250: Performed by: ORTHOPAEDIC SURGERY

## 2022-07-28 RX ORDER — AMOXICILLIN 250 MG
1 CAPSULE ORAL 2 TIMES DAILY PRN
Qty: 30 TABLET | Refills: 0 | DISCHARGE
Start: 2022-07-28 | End: 2022-10-09

## 2022-07-28 RX ORDER — HALOPERIDOL 5 MG/ML
1 INJECTION INTRAMUSCULAR EVERY 6 HOURS PRN
Status: DISCONTINUED | OUTPATIENT
Start: 2022-07-28 | End: 2022-07-31 | Stop reason: HOSPADM

## 2022-07-28 RX ORDER — SODIUM CHLORIDE 9 MG/ML
INJECTION, SOLUTION INTRAVENOUS CONTINUOUS
Status: DISCONTINUED | OUTPATIENT
Start: 2022-07-28 | End: 2022-07-29

## 2022-07-28 RX ORDER — ONDANSETRON 4 MG/1
4 TABLET, ORALLY DISINTEGRATING ORAL EVERY 8 HOURS PRN
Qty: 10 TABLET | Refills: 0 | DISCHARGE
Start: 2022-07-28 | End: 2022-09-15

## 2022-07-28 RX ORDER — IOPAMIDOL 755 MG/ML
64 INJECTION, SOLUTION INTRAVASCULAR ONCE
Status: DISCONTINUED | OUTPATIENT
Start: 2022-07-28 | End: 2022-07-28

## 2022-07-28 RX ORDER — ACETAMINOPHEN 500 MG
1000 TABLET ORAL
COMMUNITY
Start: 2022-07-28 | End: 2022-08-02

## 2022-07-28 RX ORDER — IOPAMIDOL 755 MG/ML
64 INJECTION, SOLUTION INTRAVASCULAR ONCE
Status: COMPLETED | OUTPATIENT
Start: 2022-07-28 | End: 2022-07-28

## 2022-07-28 RX ORDER — BENZTROPINE MESYLATE 1 MG/1
1 TABLET ORAL 3 TIMES DAILY PRN
Status: DISCONTINUED | OUTPATIENT
Start: 2022-07-28 | End: 2022-07-31 | Stop reason: HOSPADM

## 2022-07-28 RX ADMIN — APIXABAN 5 MG: 5 TABLET, FILM COATED ORAL at 10:45

## 2022-07-28 RX ADMIN — SODIUM CHLORIDE: 9 INJECTION, SOLUTION INTRAVENOUS at 16:41

## 2022-07-28 RX ADMIN — FERROUS SULFATE TAB 325 MG (65 MG ELEMENTAL FE) 325 MG: 325 (65 FE) TAB at 10:22

## 2022-07-28 RX ADMIN — FAMOTIDINE 40 MG: 20 TABLET ORAL at 10:22

## 2022-07-28 RX ADMIN — ACETAMINOPHEN 650 MG: 325 TABLET ORAL at 14:56

## 2022-07-28 RX ADMIN — HALOPERIDOL LACTATE 1 MG: 5 INJECTION, SOLUTION INTRAMUSCULAR at 13:35

## 2022-07-28 RX ADMIN — ACETAMINOPHEN 975 MG: 325 TABLET ORAL at 00:40

## 2022-07-28 RX ADMIN — ACETAMINOPHEN 975 MG: 325 TABLET ORAL at 10:21

## 2022-07-28 RX ADMIN — SENNOSIDES AND DOCUSATE SODIUM 1 TABLET: 50; 8.6 TABLET ORAL at 10:21

## 2022-07-28 RX ADMIN — GABAPENTIN 600 MG: 300 CAPSULE ORAL at 21:05

## 2022-07-28 RX ADMIN — ATORVASTATIN CALCIUM 10 MG: 10 TABLET, FILM COATED ORAL at 10:22

## 2022-07-28 RX ADMIN — HYDROXYZINE PAMOATE 50 MG: 25 CAPSULE ORAL at 10:22

## 2022-07-28 RX ADMIN — IOPAMIDOL 64 ML: 755 INJECTION, SOLUTION INTRAVENOUS at 14:40

## 2022-07-28 RX ADMIN — FERROUS SULFATE TAB 325 MG (65 MG ELEMENTAL FE) 325 MG: 325 (65 FE) TAB at 21:05

## 2022-07-28 RX ADMIN — SODIUM CHLORIDE 90 ML: 9 INJECTION, SOLUTION INTRAVENOUS at 14:40

## 2022-07-28 RX ADMIN — DULOXETINE 60 MG: 60 CAPSULE, DELAYED RELEASE ORAL at 10:22

## 2022-07-28 RX ADMIN — FLUTICASONE FUROATE AND VILANTEROL TRIFENATATE 1 PUFF: 200; 25 POWDER RESPIRATORY (INHALATION) at 10:47

## 2022-07-28 ASSESSMENT — ACTIVITIES OF DAILY LIVING (ADL)
ADLS_ACUITY_SCORE: 37
ADLS_ACUITY_SCORE: 38
ADLS_ACUITY_SCORE: 37
ADLS_ACUITY_SCORE: 38
ADLS_ACUITY_SCORE: 37
ADLS_ACUITY_SCORE: 38
ADLS_ACUITY_SCORE: 37

## 2022-07-28 NOTE — PROVIDER NOTIFICATION
MD Notification    Notified Person: MD    Notified Person Name:Dr. Moses/Hospitalist    Notification Date/Time: 07/28/22 8835    Notification Interaction:phone/web page    Purpose of Notification:Trop 339 and Pro BNP 14,152    Orders Received: monitor    Comments:

## 2022-07-28 NOTE — PLAN OF CARE
Goal Outcome Evaluation:    Plan of Care Reviewed With: patient, spouse     Overall Patient Progress: improving     A&O X 2. VSS, 2L NC.  CMS intact. Dressing CDI, blister arnd dressing site unchanged. Up assist of 2 with david steady.  IV SL.  Pain managed with tylenol. Voiding in urinal. Discharge pending.

## 2022-07-28 NOTE — PROGRESS NOTES
Care Management Follow Up    Length of Stay (days): 3    Expected Discharge Date: 07/28/2022     Concerns to be Addressed:       Patient plan of care discussed at interdisciplinary rounds: Yes    Anticipated Discharge Disposition: Transitional Care     Anticipated Discharge Services: None  Anticipated Discharge DME: None    Patient/family educated on Medicare website which has current facility and service quality ratings: yes  Education Provided on the Discharge Plan:    Patient/Family in Agreement with the Plan: yes    Referrals Placed by CM/SW: Transportation, Post Acute Facilities, Senior Linkage Line  Private pay costs discussed: Not applicable    Additional Information:  The bedside nurse notified the SW the patient is unable to discharge today, as the patient is having a CT scan. The SW notified [place] at [facility]. The SW spoke to [facility] and canceled the patient's 11am ride. The SW spoke to Roberta, the patient's spouse and provided an update. Roberta had a few medical questions and was referred to the nurse by the SW.    SW will continue to follow.     SY Mckeon

## 2022-07-28 NOTE — PROGRESS NOTES
[facility]    Internal Medicine Hospitalist Progress Note  07/28/2022  I evaluated patient on the above date.    Leland Trinh Jr., MD  [phone] (p)  Text Page  Vocera        Assessment & Plan New actions/orders today (07/28/2022) are underlined.    Hermilo Velasquez is a very pleasant 89 year old male with a past medical history including COPD; AS s/p AVR; AF; AAA; h/o lung cancer s/p resection; h/o NHL s/p chemotherapy; h/o DVT/PE; who underwent elective R /2022. IM Hospitalist service consulted for medical comanagement.    Status post right total knee arthroplasty 7/25/2022.  * Tramadol stopped 7/26 given oversedation.  - Post-op management per Ortho.  - Continue acetaminophen 975 mg q8h; PRN acetaminophen; minimize opioids as able.  - Continue apixaban at lower dose per Ortho.  - Continue PT, OT.    Hypoxia with right heart failure, concern for PE.  Recent hypotension, question related to meds or PE or combination.  * 7/26: Afternoon pt noted to be drowsy/lethargic with BP's 70's (had received tramadol earlier in the morning). Tramadol stopped. Given IVF's with improvement in BP's to 90's systolic.  * 7/27: BP's stable in 90's. Subsequently was hypoxic up to 5L while pt up in chair; no chest pain. Sats improved after being placed back into bed. ECG showed sinus with RBBB (RBBB not new). CXR 7/27 showed low lung volumes; mild atelectasis/infiltrate LLL. Echo 7/27 showed LVEF 55-60%; RV severely dilated with severely decreased RV systolic function; flattened septum is consistent with RV pressure/volume overload; bioprosthetic AVR (noted that gradient improved from 4/2021).   * 7/28: BP's in 110's systolic, still tachycardic, still requiring O2. Creatinine improved.  - Check right lower extremity US.  - Check CT chest for PE.  - Plan start on heparin gtt if study positive - I d/w Ortho 7/28.  - Resume IVF's.    ADDENDUM 15:29:  Right lower extremity US and CT chest negative for  thrombus.  - Cardiology consult given new R heart failure, appreciate help.    Blood loss anemia on chronic iron deficiency anemia.  * Hx PATIENCE and occult GI bleeding. Hgb as low as 6.8 on 4/11/2022 requiring transfusion. EGD 4/12/2022 showed erosive gastropathy but no stigmata of bleed. Colonoscopy 4/12/2022 showed diverticulosis and multiple AVM but no source of bleed, they were treated. Since then he has been primarily 9-10 range. Received Iron infusions. Most recent Hgb 12.1 6/27/2022.  * Hgb 10.4 on 7/26.  Recent Labs   Lab 07/28/22  0720 07/27/22  0719 07/26/22  0703   HGB 9.9* 9.5* 10.4*   - Monitor CBC.  - Consider prbc transfusion if hgb </= 7.0 or if significant bleeding with hemodynamic instability or if symptomatic.    Report of skin tears s/p recent fall.  Frequent falls.  * Of note, was admitted in 5/2022 with right knee pain s/p fall, with right knee effusion and hemarthrosis in setting of DOAC.   - Continue local wound cares per [facility] to evaluate skin tears    Chronic atrial fibrillation on chronic anticoagulation.  * PTA on apixaban. On 7/25, noted that pt had been on low dose metoprolol XL, taken off med list, however did not seen in any notes that this was to be discontinued. It was d/w pt's wife and she [facility], unclear of the dose. Started on metoprolol XL 12.5 mg daily 7/26.  - Continue to monitor on telemetry.  - Continue metoprolol XL 12.5 mg daily.  - Continue apixaban at lower dose per Ortho.  - Continue PRN IV metoprolol for sustained heart rate >120.  - Monitor K+/Mg++, replace PRN.    COPD.  - Continue fluticasone-vilanterol; PRN albuterol.  - Monitor pulse oximetry/capnography during this hospitalization per protocol.    FRED, suspect prerenal, on chronic kidney disease, stage 3a.  * Baseline Cr 1.1-1.3.   * Cr on admission, 1.33.  * Issues with low BP's 7/26, given IVF's.  * Cr increased to 1.67 7/27.  * Cr improved 7/28.  Recent Labs   Lab 07/28/22  0720  07/27/22  0719 07/26/22  0703 07/25/22  1524   CR 1.31* 1.67* 1.21 1.33*  1.33*   - Continue IVF's.  - Monitor BMP.  - Avoid nephrotoxic medications.    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  AMS, suspect metabolic and toxic encephalopathy.  * On 7/25, noted that no hx dementia/cognitive impairment, but clearly has cognitive deficits versus is just very hard of hearing during prior admissions.   * On 7/26, pt lethargic as above, associated with low BP's; tramadol stopped.  * On 7/27 mentation improved.  * On 7/28, confused, agitated overnight.  - Order PRN quetiapine 12.5 mg BID.  - Re-orient as needed.  - Maintain normal day/night, sleep/wake cycles.  - Minimize sedating medications as able.  - Patient could possibly benefit from getting neurocognitive evaluation in an outpatient setting if not already done.    Aortic stenosis s/p TAVR with bioprosthetic valve 2015.  * Coronary angiogram at that time was negative for obstructive CAD. TTE 4/2021 showed preserved LVEF with mild to moderate prosthetic valve stenosis.   - Monitor clinically.    Chronic peripheral neuropathy.  - Continue PTA gabapentin 600 mg at bedtime.    Dyslipidemia.  * Chronic and stable.  - Continue atorvastatin.    GERD.  * Chronic and stable.  - Continue famotidine.    Bullous pemphigoid and chronic pruritus.  - Continue hydroxyzine.    MGUS.  History of stage 4 Non-Hodgkins Lymphoma in remission  * Followed by Dr. Zurita of [facility]. His lymphoma was diagnosed and treated with systemic chemotherapy in 2012 and reportedly has been in remission since. His MGUS is being monitored as an outpatient.  - Follow-up outpatient.    AAA.  * CT in 4/2022 showed a 3.5 cm saccular infrarenal abdominal aortic aneurysm.  - Monitor outpatient.    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  - Continue apixaban.    History of lung cancer status post wedge resection 2019.  - Noted.      Clinically Significant Risk  "Factors Present on Admission                        COVID-19 testing.  COVID-19 PCR Results    COVID-19 PCR Results 8/15/20 9/13/20 9/13/20 10/23/20 10/23/20 4/13/21 3/5/22 4/10/22 5/8/22 7/22/22     1256 1256 1702 1702        COVID-19 Virus PCR to [facility] - Result Not Detected Test received-See reflex to IDDL test SARS CoV2 (COVID-19) Virus RT-PCR  Test received-See reflex to IDDL test SARS CoV2 (COVID-19) Virus RT-PCR         COVID-19 Virus PCR to [facility] - Source Nasopharyngeal Nasopharyngeal  Nasopharyngeal         COVID-19 Virus by PCR (External Result)       Not Detected      SARS-CoV-2 Virus Specimen Source   Nasopharyngeal  Nasopharyngeal        Flu A/B & SARS-COV-2 PCR Source      Nasopharyngeal       SARS-CoV-2 PCR Result   NEGATIVE  NEGATIVE NEGATIVE       SARS CoV2 PCR        Negative Negative Negative      Comments are available for some flowsheets but are not being displayed.         COVID-19 Antibody Results, Testing for Immunity    COVID-19 Antibody Results, Testing for Immunity   No data to display.             Diet: Regular Diet Adult  Low Saturated Fat Na <2400 mg  Diet    Prophylaxis: PCD's, ambulation. On apixaban.  Suazo Catheter: Not present  Central Lines: None  Code Status: Full Code    Disposition Plan   Expected discharge: 1-2d recommended to prior living arrangement pending above; not ready from medical standpoint for discharge.  Entered: Leland Trinh MD 07/28/2022, 8:14 AM     Communication.  - I d/w Ortho 7/28.      Interval History   Confused and agitated overnight, trying to climb out of bed at times.  Denies chest pain or dyspnea presently.    -Data reviewed today: I reviewed all new labs and imaging over the last 24 hours. I personally reviewed no images or EKG's today.    Physical Exam    , Blood pressure 116/70, pulse 107, temperature 98.3  F (36.8  C), temperature source Axillary, resp. rate 16, height 1.778 m (5' 10\"), weight 75.8 kg (167 lb 1.7 oz), SpO2 94 %. O2 " Device: None (Room air) Oxygen Delivery: 2 LPM  Vitals:    07/25/22 0854 07/26/22 0201   Weight: 70.4 kg (155 lb 4.8 oz) 75.8 kg (167 lb 1.7 oz)     Vital Signs with Ranges  Temp:  [97.7  F (36.5  C)-98.9  F (37.2  C)] 98.3  F (36.8  C)  Pulse:  [] 107  Resp:  [16-18] 16  BP: (116-119)/(53-70) 116/70  SpO2:  [83 %-96 %] 94 %  Patient Vitals for the past 24 hrs:   BP Temp Temp src Pulse Resp SpO2   07/28/22 0720 116/70 98.3  F (36.8  C) Axillary 107 16 94 %   07/28/22 0039 118/64 98.9  F (37.2  C) Oral 89 18 94 %   07/27/22 2028 -- -- -- -- -- 93 %   07/27/22 1605 -- -- -- -- -- 92 %   07/27/22 1531 119/53 97.7  F (36.5  C) Axillary 72 18 95 %   07/27/22 1500 -- -- -- -- -- 93 %   07/27/22 1357 -- -- -- -- -- 93 %   07/27/22 1100 -- -- -- -- -- 96 %   07/27/22 0901 -- -- -- -- -- 92 %   07/27/22 0900 -- -- -- -- -- (!) 83 %     I/O's Last 24 hours  I/O last 3 completed shifts:  In: 490 [P.O.:490]  Out: 775 [Urine:775]    Constitutional: Easily arousable and answers simple questions.  Respiratory: Diminished in bases. Crackles in right base.  Cardiovascular: RRR, no m/r/g.  GI: Soft, nt, nd, +BS.  Skin/Integumen: Trace to 1+ right lower extremity edema.  Other:        Data   Recent Labs   Lab 07/28/22  0720 07/27/22  0719 07/26/22  0703   WBC 15.1* 13.1*  --    HGB 9.9* 9.5* 10.4*   MCV 88 92  --     140*  --     141 142   POTASSIUM 3.8 4.7 4.5   CHLORIDE 111* 112* 111*   CO2 23 22 24   BUN 41* 37* 24   CR 1.31* 1.67* 1.21   ANIONGAP 5 7 7   AMRITA 8.5 7.8* 8.1*   * 105* 105*  105*     Recent Labs   Lab Test 07/28/22  0720 07/27/22  0719 07/26/22  0703 07/26/22  0158 07/25/22  2321 09/15/20  0735 09/15/20  0200 03/29/20  0611 03/29/20  0157 03/25/20  0740 03/24/20  2206 03/24/20  1826 03/23/20  0459 03/23/20  0431   * 105* 105*  105* 108* 115*   < >  --    < >  --    < >  --   --    < >  --    BGM  --   --   --   --   --   --  163*  --  103*  --  120* 121*  --  131*    < > = values in  this interval not displayed.     Recent Labs   Lab 22  0720 22  0719 22  1024   WBC 15.1* 13.1*  --    LACT  --   --  1.8         Recent Results (from the past 24 hour(s))   XR Chest Port 1 View    Narrative    CHEST PORTABLE ONE VIEW  2022 11:24 AM       INDICATION: Dyspnea, hypoxia, evaluate for focal infiltrates/edema.  COMPARISON: 4/10/2022       Impression    IMPRESSION: Low lung volumes. Mild atelectasis or infiltrate left  lower lobe. Stable linear scarring right upper lung. The lungs are  otherwise clear.    MARGARITA ADAMS MD         SYSTEM ID:  [id number]   Echocardiogram Complete   Result Value    LVEF  55-60%    Narrative    [id number]  [id number]  [id number]  [id number]^SANIA^AZIZA^RADHA     [facility]  Echocardiography Laboratory  [address] [place], [place] [address]     Name: CLYDE RODRIGUEZ  MRN: [id number]  : 1932  Study Date: 2022 11:50 AM  Age: 89 yrs  Gender: Male  Patient Location: [facility]  Reason For Study: Dyspnea  Ordering Physician: AZIZA LUI  Referring Physician: Karson Bishop MD  Performed By: Amaya Mcdonald MORIS     BSA: 1.9 m2  Height: 70 in  Weight: 167 lb  HR: 62  BP: 92/48 mmHg  ______________________________________________________________________________  Procedure  Complete Echo Adult.  ______________________________________________________________________________  Interpretation Summary     Left ventricular systolic function is normal.  The visual ejection fraction is 55-60%.  The right ventricle is severely dilated. Severely decreased right ventricular  systolic function.  Flattened septum is consistent with RV pressure/volume overload.  Bioprosthetic AVR. Vmax 1.9 m/sec, mean gradient 8 mmHg, АННА 1.6 cm2, DVI 0.5.  The right atrium is severely dilated.  The left atrium is moderately dilated.  The inferior vena cava was normal in size with preserved respiratory  variability.  There is no pericardial  effusion.     Compared to study dated 4/14/2021, the gradient across AVR has improved and is  within normal for this valve type. The right ventricle is severely abnormal on  today's study compared to prior. Consider pulmonary embolism on the  differential.  ______________________________________________________________________________  Left Ventricle  The left ventricle is normal in size. There is normal left ventricular wall  thickness. Left ventricular systolic function is normal. The visual ejection  fraction is 55-60%. Grade I or early diastolic dysfunction. Flattened septum  is consistent with RV pressure/volume overload.     Right Ventricle  The right ventricle is severely dilated. Severely decreased right ventricular  systolic function. RV apical akinesis.     Atria  The left atrium is moderately dilated. The right atrium is severely dilated.     Mitral Valve  There is moderate mitral annular calcification. The mean mitral valve gradient  is 1.2 mmHg.     Tricuspid Valve  The tricuspid valve is normal in structure and function. There is mild (1+)  tricuspid regurgitation. The right ventricular systolic pressure is  approximated at 25.3 mmHg plus the right atrial pressure.     Aortic Valve  The prosthetic aortic valve is well-seated. Bioprosthetic AVR. Vmax 1.9 m/sec,  mean gradient 8 mmHg, АННА 1.6 cm2, DVI 0.5.     Pulmonic Valve  The pulmonic valve is not well visualized.     Vessels  The aortic root is normal size. The inferior vena cava was normal in size with  preserved respiratory variability.     Pericardium  There is no pericardial effusion.     ______________________________________________________________________________  MMode/2D Measurements & Calculations  IVSd: 0.80 cm  LVIDd: 5.0 cm  LVIDs: 3.7 cm  LVPWd: 0.97 cm  FS: 27.1 %  LV mass(C)d: 157.8 grams  LV mass(C)dI: 81.6 grams/m2     LA dimension: 3.5 cm  LVOT diam: 2.0 cm  LVOT area: 3.2 cm2  LA Volume (BP): 75.7 ml  LA Volume Index (BP): 39.2  ml/m2  RWT: 0.39     Doppler Measurements & Calculations  MV E max santi: 40.3 cm/sec  MV A max santi: 75.2 cm/sec  MV E/A: 0.54  MV max PG: 3.2 mmHg  MV mean P.2 mmHg  MV V2 VTI: 26.0 cm  MVA(VTI): 2.8 cm2  MV dec slope: 122.0 cm/sec2  MV dec time: 0.34 sec     Ao V2 max: 192.7 cm/sec  Ao max PG: 15.0 mmHg  Ao V2 mean: 138.4 cm/sec  Ao mean P.4 mmHg  Ao V2 VTI: 40.9 cm  АННА(I,D): 1.8 cm2  АННА(V,D): 1.7 cm2  LV V1 max P.1 mmHg  LV V1 max: 101.4 cm/sec  LV V1 VTI: 23.5 cm  SV(LVOT): 73.9 ml  SI(LVOT): 38.2 ml/m2  TR max santi: 251.2 cm/sec  TR max P.3 mmHg  AV Santi Ratio (DI): 0.53  АННА Index (cm2/m2): 0.93  E/E' av.4  Lateral E/e': 3.3  Medial E/e': 7.6     ______________________________________________________________________________  Report approved by: Calli Gutiérrez 2022 03:17 PM             Medications   All medications were reviewed.    sodium chloride 75 mL/hr at 22 0059       acetaminophen  975 mg Oral Q8H     apixaban ANTICOAGULANT  5 mg Oral Daily     atorvastatin  10 mg Oral Daily     DULoxetine  60 mg Oral Daily     famotidine  40 mg Oral Daily     ferrous sulfate  325 mg Oral BID     fluticasone-vilanterol  1 puff Inhalation Daily     gabapentin  600 mg Oral At Bedtime     hydrOXYzine  50 mg Oral Daily     polyethylene glycol  17 g Oral Daily     senna-docusate  1 tablet Oral BID     acetaminophen, albuterol, albuterol, sore throat lozenge, bisacodyl, dimenhyDRINATE, hydrALAZINE, lidocaine (buffered or not buffered), magnesium hydroxide, metoprolol, naloxone **OR** naloxone **OR** naloxone **OR** naloxone, ondansetron **OR** ondansetron, prochlorperazine **OR** prochlorperazine, sodium chloride (PF)

## 2022-07-28 NOTE — PROCEDURES
[facility]    Single Lumen Midline Placement    Date/Time: 7/28/2022 12:25 PM  Performed by: Adriane Zhang RN  Authorized by: Leland Trinh MD   Indications: vascular access      UNIVERSAL PROTOCOL   Site Marked: Yes  Prior Images Obtained and Reviewed:  Yes  Required items: Required blood products, implants, devices and special equipment available    Patient identity confirmed:  Arm band and hospital-assigned identification number  NA - No sedation, light sedation, or local anesthesia  Confirmation Checklist:  Patient's identity using two indicators, relevant allergies, procedure was appropriate and matched the consent or emergent situation and correct equipment/implants were available  Time out: Immediately prior to the procedure a time out was called    Universal Protocol: the Joint Commission Universal Protocol was followed    Preparation: Patient was prepped and draped in usual sterile fashion      SEDATION    Patient Sedated: No        Preparation: skin prepped with 2% chlorhexidine and skin prepped with ChloraPrep  Skin prep agent: skin prep agent completely dried prior to procedure  Sterile barriers: maximum sterile barriers were used: cap, mask, sterile gown, sterile gloves, and large sterile sheet  Hand hygiene: hand hygiene performed prior to central venous catheter insertion  Type of line used: Midline and Power PICC  Catheter type: single lumen  Lumen type: non-valved  Catheter size: 4 Fr  Brand: Bard  Lot number: [id number]  Placement method: MST and ultrasound  Number of attempts: 1  Difficulty threading catheter: no  Successful placement: yes  Orientation: right    Location: basilic vein  Arm circumference: adults 10 cm  Extremity circumference: 28  Visible catheter length: 1  Internal length: 14 cm  Total catheter length: 15  Dressing and securement: blood removed, chlorhexidine patch applied, occlusive dressing applied and securement device  Post procedure  assessment: blood return through all ports  PROCEDURE   Patient Tolerance:  Patient tolerated the procedure well with no immediate complications

## 2022-07-28 NOTE — PROVIDER NOTIFICATION
Pt has STAT CT, pt did not have IV. IV team placed 18 gauge IV went bad in CT, pt back in room. Order for Midline, IV team in room place Midline. Will send pt down for CT once midline is in.

## 2022-07-28 NOTE — PROGRESS NOTES
Ortho    Results all  Negative    Will continue  Pt  Bid  Here  While in hospital  dont feel  There are  Any  Reasons  To  Avoid   Working  With  Pt  As best  He can.    Will bump up his  elloquis to  Full dose and hope it  Doesn't cause  Any  blleds into his new  Joint.

## 2022-07-28 NOTE — PROGRESS NOTES
"ORTHOPEDIC LOWER EXTREMITY PROGRESS NOTE    POD#3  Patient is a 89 year old male who underwent Procedure(s):  RIGHT TOTAL KNEE ARTHROPLASTY on 2022. Pain is well controlled on tylenol. Patient was noted to be hypoxic yesterday afternoon requiring 5L O2.  Hospitalist following, EKG and CXR done yesterday.  Planning to discharge to [facility] today.    Vitals:   Blood pressure 116/70, pulse 107, temperature 98.3  F (36.8  C), temperature source Axillary, resp. rate 16, height 1.778 m (5' 10\"), weight 75.8 kg (167 lb 1.7 oz), SpO2 94 %.  Temp (24hrs), Av.3  F (36.3  C), Min:96  F (35.6  C), Max:98.1  F (36.7  C)      Drains: none    EXAM   The patient is awake and alert.  Answers questions appropriately  Calves are soft and non-tender.   Sensation is intact.  Dorsiflexion and plantar flexion is intact.  Foot warm with nl cap refill.  The incision is covered with AG dressing with no shadow    Labs:   Recent Labs   Lab Test 22  0720 22  0719 22  0703 22  2336 22  1223 10/24/20  0037 10/23/20  1617 20  0829 20  0948   HGB 9.9* 9.5* 10.4*   < > 11.2*   < > 6.6*   < > 7.7*   INR  --   --   --   --  1.99*  --  1.39*  --  1.21*    < > = values in this interval not displayed.       ASSESSMENT  S/p R TKA   PLAN  1. DVT prophylaxis: Eliquis  2. Weight Bearing: WBAT (Weight bearing as tolerated).  3. Anticipated discharge date today if cleared by medicine. Discharge to [facility], [facility].  SW following, appreciate assistance  4. Cont Pain Control tylenol.  No narcotics       Just spoke with hospitalist, concern for PE, chest CT to be done soon.  Will likely need to start on heparin drip if positive.  No discharge until cleared by medicine.      Soila Rivera PA-C  [facility]        "

## 2022-07-28 NOTE — CONSULTS
Cardiology Consultation      Hermilo Velasquez MRN# [id number]   YOB: 1932 Age: 89 year old   Date of Admission: 7/25/2022     Reason for consult:  Right ventricular dysfunction           Assessment and Plan:     89-year-old gentleman with a past medical history significant for severe aortic stenosis status post bioprosthetic AVR with a 25 mm [employer] trifecta prosthesis on 9/30/2015 (coronary angiography prior to surgery demonstrated no significant obstructive CAD), history of bilateral pulmonary emboli in 2016 for which he is on chronic anticoagulation therapy, and paroxysmal atrial fibrillation who underwent right knee total arthroplasty on 7/25/2022.  Postoperative hypotension and hypoxia was noted and an echocardiogram demonstrated significant right ventricular dysfunction which was a new finding compared to his prior echo from April 2021.  A subsequent CT of the chest was negative for PE.    IMPRESSION:    1. Status post elective right knee total arthroplasty on 7/25/2022.  2. Postoperative hypotension/hypoxia which is improving.  3. Moderate to severe right ventricular systolic dysfunction which is a new finding compared to his prior echocardiogram from April 2021.  4. Severe aortic stenosis status post bioprosthetic AVR in 2015 as described above.  5. COPD  6. Paroxysmal atrial fibrillation.  7. History of bilateral pulmonary emboli in 2016 and a right lower lobe pulmonary embolism in 2020.    PLAN  -The patient has a history of severe right ventricular dysfunction after his pulmonary embolism in 2016.  Right ventricular systolic function gradually improved and was within normal limits on his previous echocardiograms from 2020 and 2021, but his right ventricular reserve is probably diminished given this history and his other comorbidities of COPD and paroxysmal atrial fibrillation.  -It is possible that the stress of anesthesia triggered recurrent right ventricular dysfunction.  Another less  "likely possibility is that of fat emboli.  Progression of coronary artery disease is unlikely given his normal coronary angiography prior to AVR.  -In any event, supportive treatment is the mainstay of therapy.  I agree with fluid resuscitation and oxygen supplementation as needed.  If he develops signs and symptoms of fluid overload gentle diuresis may also be indicated.  -I have ordered cardiac troponins but would not recommend an ischemic work-up unless these are markedly elevated.             Chief Complaint:   No chief complaint on file.           History of Present Illness:     This patient is a 89 year old male who has a history of severe aortic stenosis and is status post bioprosthetic AVR with a 25 mm trifecta prosthesis in .  Preoperative coronary angiography demonstrated no significant obstructive coronary artery disease.  He also has a history of recurrent pulmonary emboli with significant right ventricular dysfunction in the past and has been on chronic anticoagulant therapy.  Finally, he also has a history of paroxysmal atrial fibrillation.    He was admitted to [facility] for an elective total knee arthroplasty on 2022.  On 2022 he was noted to be drowsy and lethargic with associated hypotension which improved with IV fluids.  The next day he was noted to be hypoxic and a subsequent echocardiogram demonstrated severe right ventricular dysfunction.  A stat CT of the chest was negative for PE.    Currently the patient is somnolent and unable to provide any meaningful history.  Both his hemodynamics and oxygen have improved during the course of the day,however.             Physical Exam:   Vitals were reviewed  Blood pressure (!) 143/73, pulse 91, temperature 97.3  F (36.3  C), temperature source Oral, resp. rate 16, height 1.778 m (5' 10\"), weight 75.8 kg (167 lb 1.7 oz), SpO2 99 %.  Temperatures:  Current - Temp: 97.3  F (36.3  C); Max - Temp  Av.2  F (36.8  C)  Min: 97.3  F " (36.3  C)  Max: 98.9  F (37.2  C)  Respiration range: Resp  Av.5  Min: 16  Max: 18  Pulse range: Pulse  Av.5  Min: 89  Max: 107  Blood pressure range: Systolic (24hrs), Av , Min:114 , Max:143   ; Diastolic (24hrs), Av, Min:64, Max:73    Pulse oximetry range: SpO2  Av.7 %  Min: 88 %  Max: 99 %    Intake/Output Summary (Last 24 hours) at 2022 1635  Last data filed at 2022 1453  Gross per 24 hour   Intake 240 ml   Output 975 ml   Net -735 ml     Constitutional:   Somnolent     Eyes:   Lids and lashes normal, pupils equal, round and reactive to light, extra ocular muscles intact, sclera clear, conjunctiva normal     Neck:   supple, symmetrical, trachea midline, no JVD     Back:   symmetric     Lungs:   No increased work of breathing, good air exchange, clear to auscultation bilaterally, no crackles or wheezing       Cardiovascular:   Normal apical impulse, regular rate and rhythm, normal S1 and S2, no S3 or S4, and no murmur noted.      Abdomen:   non-tender     Musculoskeletal:   motor strength is 5 out of 5 all extremities bilaterally     Neurologic:   Grossly nonfocal     Skin:   no bruising or bleeding     Additional findings:     No edema                 Past Medical History:   I have reviewed this patient's past medical history  Past Medical History:   Diagnosis Date     Adenocarcinoma, lung, right (H) 2019    S/P RLL Wedge resection with Dr. Vasques      Aortic stenosis     Severe AS, 2015 AVR - [employer] HENOK TRIFECTA Bovine bioprosthesis 25MM TF-25A     Atrial fibrillation (H)     2015 Paroxysmal post op Afib - discharged on Warfarin and a beta blocker     COPD (chronic obstructive pulmonary disease) (H)      Deep vein thrombosis (H)      GERD (gastroesophageal reflux disease)      Heart murmur      Monoclonal gammopathy     plasmacyte prominent causing monoclonal gammopathy     Need for SBE (subacute bacterial endocarditis) prophylaxis      Neuropathy      Other and  unspecified hyperlipidemia      Other malignant lymphomas     non hodgkin's lymphoma     RBBB (right bundle branch block)      Severe sepsis with acute organ dysfunction (H) 11/16/2015     Unspecified hereditary and idiopathic peripheral neuropathy              Past Surgical History:   I have reviewed this patient's past surgical history  Past Surgical History:   Procedure Laterality Date     APPENDECTOMY       ARTHROPLASTY KNEE Right 7/25/2022    Procedure: RIGHT TOTAL KNEE ARTHROPLASTY;  Surgeon: Giuliano Deshpande MD;  Location:  OR     AS TOTAL KNEE ARTHROPLASTY       BACK SURGERY       ESOPHAGOSCOPY, GASTROSCOPY, DUODENOSCOPY (EGD), COMBINED N/A 11/28/2015    Procedure: COMBINED ESOPHAGOSCOPY, GASTROSCOPY, DUODENOSCOPY (EGD);  Surgeon: Danis Castillo MD;  Location:  GI     ESOPHAGOSCOPY, GASTROSCOPY, DUODENOSCOPY (EGD), COMBINED N/A 7/26/2018    Procedure: COMBINED ESOPHAGOSCOPY, GASTROSCOPY, DUODENOSCOPY (EGD);;  Surgeon: Toby Dong DO;  Location:  GI     ESOPHAGOSCOPY, GASTROSCOPY, DUODENOSCOPY (EGD), COMBINED N/A 8/17/2020    Procedure: ESOPHAGOGASTRODUODENOSCOPY (EGD);  Surgeon: Herrera Henry MD;  Location:  GI     ESOPHAGOSCOPY, GASTROSCOPY, DUODENOSCOPY (EGD), COMBINED N/A 10/24/2020    Procedure: ESOPHAGOGASTRODUODENOSCOPY (EGD);  Surgeon: Vick Florentino MD;  Location:  GI     ESOPHAGOSCOPY, GASTROSCOPY, DUODENOSCOPY (EGD), COMBINED N/A 4/11/2022    Procedure: ESOPHAGOGASTRODUODENOSCOPY (EGD);  Surgeon: Vick Florentino MD;  Location:  GI     HERNIA REPAIR  2006     HERNIORRHAPHY VENTRAL  4/17/2013    Procedure: HERNIORRHAPHY VENTRAL;  VENTRAL HERNIA REPAIR WITH MESH;  Surgeon: Patel Guzman MD;  Location:  OR     KNEE SURGERY      arthroscopic right knee surgery      LOBECTOMY LUNG Right 3/26/2019    POSSIBLE LOBECTOMY LUNG per Dr. Vas[facility] at [facility]     REPAIR LIGAMENT ANKLE  2/23/2012    Procedure:REPAIR LIGAMENT ANKLE; LEFT TARSAL TUNNEL RELEASE  "OF KNOT OF ARIEL RELEASE; Surgeon:SAUL PUENTE; Location:[facility] OR     REPLACE VALVE AORTIC N/A 9/3/2015    Procedure: REPLACE VALVE AORTIC;  Surgeon: Antonino Mitchell MD;  Location: [facility] OR     ROTATOR CUFF REPAIR RT/LT      bilateral     SPINE SURGERY      3 spine surgeries     THORACOTOMY, WEDGE RESECTION LUNG, COMBINED Right 3/26/2019    RIGHT THORACOTOMY, WEDGE RESECTION, RIGHT LOWER LOBE LUNG NODULE,;  Surgeon: Jose A Vasques MD;  Location: [facility] OR     TONSILLECTOMY       TURP                 Social History:   I have reviewed this patient's social history  Social History     Tobacco Use     Smoking status: Former Smoker     Packs/day: 2.00     Years: 55.00     Pack years: 110.00     Quit date: 1998     Years since quittin.5     Smokeless tobacco: Never Used   Substance Use Topics     Alcohol use: Yes     Alcohol/week: 0.0 standard drinks     Comment: 1 drink per day             Family History:   I have reviewed this patient's family history  Family History   Problem Relation Age of Onset     C.A.D. Father      Emphysema Father      Melanoma No family hx of      Skin Cancer No family hx of              Allergies:     Allergies   Allergen Reactions     Lidocaine Blisters     Allergy to lidocaine ointment     Omeprazole Itching     Pantoprazole Itching     Prevacid [Lansoprazole] Itching     Lasix [Furosemide] Rash     Penicillins Rash     \"broke out from injection\" 60 yrs ago  Tolerates cephalosporins             Medications:   I have reviewed this patient's current medications  Medications Prior to Admission   Medication Sig Dispense Refill Last Dose     albuterol (PROAIR HFA/PROVENTIL HFA/VENTOLIN HFA) 108 (90 Base) MCG/ACT inhaler Inhale 1-2 puffs into the lungs every 6 hours as needed   2022 at am     albuterol (PROVENTIL) (2.5 MG/3ML) 0.083% neb solution Take 1 vial (2.5 mg) by nebulization every 6 hours as needed for shortness of breath / dyspnea or wheezing 180 mL 11 " Past Week at prn     apixaban ANTICOAGULANT (ELIQUIS) 5 MG tablet Take 5 mg by mouth in the morning and 5 mg in the evening.   7/22/2022 at pm     atorvastatin (LIPITOR) 10 MG tablet Take 1 tablet (10 mg) by mouth daily 90 tablet 2 7/25/2022 at am     dimenhyDRINATE (DRAMAMINE) 50 MG tablet Take 50 mg by mouth as needed   7/25/2022 at prn     DULoxetine (CYMBALTA) 60 MG capsule Take 60 mg by mouth daily   7/25/2022 at am     famotidine (PEPCID) 40 MG tablet Take 40 mg by mouth 2 times daily   7/24/2022 at am     ferrous sulfate (FE TABS) 325 (65 Fe) MG EC tablet Take 325 mg by mouth 2 times daily   7/25/2022 at pm     fluticasone-vilanterol (BREO ELLIPTA) 200-25 MCG/INH inhaler Inhale 1 puff into the lungs daily    at prn     gabapentin (NEURONTIN) 300 MG capsule Take 600 mg by mouth At Bedtime (2 x 300 mg = 600 mg)   7/24/2022 at pm     ACE/ARB/ARNI NOT PRESCRIBED (INTENTIONAL) Please choose reason not prescribed from choices below.        [DISCONTINUED] acetaminophen (TYLENOL) 500 MG tablet Take 2 tablets (1,000 mg) by mouth 3 times daily   7/24/2022 at am     [DISCONTINUED] hydrOXYzine (VISTARIL) 50 MG capsule Take 50 mg by mouth daily   7/24/2022 at am     Current Facility-Administered Medications Ordered in Epic   Medication Dose Route Frequency Last Rate Last Admin     acetaminophen (TYLENOL) tablet 650 mg  650 mg Oral Q4H PRN   650 mg at 07/28/22 1456     albuterol (PROVENTIL HFA/VENTOLIN HFA) inhaler  1-2 puff Inhalation Q6H PRN         albuterol (PROVENTIL) neb solution 2.5 mg  2.5 mg Nebulization Q6H PRN         [START ON 7/29/2022] apixaban ANTICOAGULANT (ELIQUIS) tablet 5 mg  5 mg Oral BID         atorvastatin (LIPITOR) tablet 10 mg  10 mg Oral Daily   10 mg at 07/28/22 1022     benzocaine-menthol (CHLORASEPTIC) 6-10 MG lozenge 1 lozenge  1 lozenge Buccal Q1H PRN         benztropine (COGENTIN) tablet 1 mg  1 mg Oral TID PRN         bisacodyl (DULCOLAX) suppository 10 mg  10 mg Rectal Daily PRN          DULoxetine (CYMBALTA) DR capsule 60 mg  60 mg Oral Daily   60 mg at 07/28/22 1022     famotidine (PEPCID) tablet 40 mg  40 mg Oral Daily   40 mg at 07/28/22 1022     ferrous sulfate (FEROSUL) tablet 325 mg  325 mg Oral BID   325 mg at 07/28/22 1022     fluticasone-vilanterol (BREO ELLIPTA) 200-25 MCG/INH inhaler 1 puff  1 puff Inhalation Daily   1 puff at 07/28/22 1047     gabapentin (NEURONTIN) capsule 600 mg  600 mg Oral At Bedtime   600 mg at 07/27/22 2028     haloperidol lactate (HALDOL) injection 1 mg  1 mg Intravenous Q6H PRN   1 mg at 07/28/22 1335     hydrALAZINE (APRESOLINE) injection 10 mg  10 mg Intravenous Q4H PRN         lidocaine 1 % 0.1-1 mL  0.1-1 mL Other Q1H PRN         magnesium hydroxide (MILK OF MAGNESIA) suspension 30 mL  30 mL Oral Daily PRN         metoprolol (LOPRESSOR) injection 2.5 mg  2.5 mg Intravenous Q4H PRN         naloxone (NARCAN) injection 0.2 mg  0.2 mg Intravenous Q2 Min PRN        Or     naloxone (NARCAN) injection 0.4 mg  0.4 mg Intravenous Q2 Min PRN        Or     naloxone (NARCAN) injection 0.2 mg  0.2 mg Intramuscular Q2 Min PRN        Or     naloxone (NARCAN) injection 0.4 mg  0.4 mg Intramuscular Q2 Min PRN         ondansetron (ZOFRAN ODT) ODT tab 4 mg  4 mg Oral Q6H PRN        Or     ondansetron (ZOFRAN) injection 4 mg  4 mg Intravenous Q6H PRN         polyethylene glycol (MIRALAX) Packet 17 g  17 g Oral Daily   17 g at 07/27/22 0846     prochlorperazine (COMPAZINE) injection 5 mg  5 mg Intravenous Q6H PRN        Or     prochlorperazine (COMPAZINE) tablet 5 mg  5 mg Oral Q6H PRN         senna-docusate (SENOKOT-S/PERICOLACE) 8.6-50 MG per tablet 1 tablet  1 tablet Oral BID   1 tablet at 07/28/22 1021     sodium chloride (PF) 0.9% PF flush 10 mL  10 mL Intracatheter Q8H   10 mL at 07/28/22 1338     sodium chloride (PF) 0.9% PF flush 10-20 mL  10-20 mL Intracatheter q1 min prn         sodium chloride (PF) 0.9% PF flush 3 mL  3 mL Intracatheter q1 min prn   3 mL at 07/26/22  2242     sodium chloride 0.9% infusion   Intravenous Continuous         Current Outpatient Medications Ordered in Epic   Medication     acetaminophen (TYLENOL) 325 MG tablet     acetaminophen (TYLENOL) 500 MG tablet     cephALEXin (KEFLEX) 250 MG capsule     ondansetron (ZOFRAN ODT) 4 MG ODT tab     polyethylene glycol (MIRALAX) 17 g packet     senna-docusate (SENOKOT-S/PERICOLACE) 8.6-50 MG tablet             Review of Systems:     The 10 point Review of Systems is negative other than noted in the HPI            Data:   All laboratory data reviewed  Results for orders placed or performed during the hospital encounter of 07/25/22 (from the past 24 hour(s))   CBC with Platelets & Differential    Narrative    The following orders were created for panel order CBC with Platelets & Differential.  Procedure                               Abnormality         Status                     ---------                               -----------         ------                     CBC with platelets and d...[[id number]]  Abnormal            Final result                 Please view results for these tests on the individual orders.   Basic metabolic panel   Result Value Ref Range    Sodium 139 133 - 144 mmol/L    Potassium 3.8 3.4 - 5.3 mmol/L    Chloride 111 (H) 94 - 109 mmol/L    Carbon Dioxide (CO2) 23 20 - 32 mmol/L    Anion Gap 5 3 - 14 mmol/L    Urea Nitrogen 41 (H) 7 - 30 mg/dL    Creatinine 1.31 (H) 0.66 - 1.25 mg/dL    Calcium 8.5 8.5 - 10.1 mg/dL    Glucose 114 (H) 70 - 99 mg/dL    GFR Estimate 52 (L) >60 mL/min/1.73m2   CBC with platelets and differential   Result Value Ref Range    WBC Count 15.1 (H) 4.0 - 11.0 10e3/uL    RBC Count 3.59 (L) 4.40 - 5.90 10e6/uL    Hemoglobin 9.9 (L) 13.3 - 17.7 g/dL    Hematocrit 31.6 (L) 40.0 - 53.0 %    MCV 88 78 - 100 fL    MCH 27.6 26.5 - 33.0 pg    MCHC 31.3 (L) 31.5 - 36.5 g/dL    RDW 15.9 (H) 10.0 - 15.0 %    Platelet Count 151 150 - 450 10e3/uL    % Neutrophils 76 %    % Lymphocytes 4  %    % Monocytes 11 %    % Eosinophils 8 %    % Basophils 0 %    % Immature Granulocytes 1 %    NRBCs per 100 WBC 0 <1 /100    Absolute Neutrophils 11.5 (H) 1.6 - 8.3 10e3/uL    Absolute Lymphocytes 0.6 (L) 0.8 - 5.3 10e3/uL    Absolute Monocytes 1.6 (H) 0.0 - 1.3 10e3/uL    Absolute Eosinophils 1.3 (H) 0.0 - 0.7 10e3/uL    Absolute Basophils 0.0 0.0 - 0.2 10e3/uL    Absolute Immature Granulocytes 0.1 <=0.4 10e3/uL    Absolute NRBCs 0.0 10e3/uL   Lactic Acid STAT   Result Value Ref Range    Lactic Acid 0.9 0.7 - 2.0 mmol/L   US Lower Extremity Venous Duplex Right    Narrative    US LOWER EXTREMITY VENOUS DUPLEX RIGHT 7/28/2022 9:26 AM    CLINICAL HISTORY/INDICATION: Right lower extremity pain/swelling,  evaluate for signs of DVT.    COMPARISON: 8/17/2020    TECHNIQUE:   Grayscale, color-flow, and spectral waveform analysis were performed  of the deep veins of the right lower extremity    FINDINGS:   The right common femoral vein, femoral vein and popliteal vein  demonstrate normal compressibility, spectral waveform, color flow and  augmentation.    The right posterior tibial vein, peroneal vein, and greater saphenous  vein are compressible.    The contralateral left common femoral vein demonstrates normal  compressibility, spectral waveform, color flow and augmentation.      Impression    IMPRESSION:   No evidence of deep venous thrombosis in the right lower extremity    ESAU SMITH DO         SYSTEM ID:  [id number]   Single Lumen Midline Placement    Narrative    Adriane Zhang RN     7/28/2022 12:28 PM  [facility]    Single Lumen Midline Placement    Date/Time: 7/28/2022 12:25 PM  Performed by: Adriane Zhang RN  Authorized by: Leland Trinh MD   Indications: vascular access      UNIVERSAL PROTOCOL   Site Marked: Yes  Prior Images Obtained and Reviewed:  Yes  Required items: Required blood products, implants, devices and special   equipment available    Patient identity  confirmed:  Arm band and hospital-assigned identification   number  NA - No sedation, light sedation, or local anesthesia  Confirmation Checklist:  Patient's identity using two indicators, relevant   allergies, procedure was appropriate and matched the consent or emergent   situation and correct equipment/implants were available  Time out: Immediately prior to the procedure a time out was called    Universal Protocol: the Joint Commission Universal Protocol was followed    Preparation: Patient was prepped and draped in usual sterile fashion      SEDATION    Patient Sedated: No        Preparation: skin prepped with 2% chlorhexidine and skin prepped with   ChloraPrep  Skin prep agent: skin prep agent completely dried prior to procedure  Sterile barriers: maximum sterile barriers were used: cap, mask, sterile   gown, sterile gloves, and large sterile sheet  Hand hygiene: hand hygiene performed prior to central venous catheter   insertion  Type of line used: Midline and Power PICC  Catheter type: single lumen  Lumen type: non-valved  Catheter size: 4 Fr  Brand: Bard  Lot number: [id number]  Placement method: MST and ultrasound  Number of attempts: 1  Difficulty threading catheter: no  Successful placement: yes  Orientation: right    Location: basilic vein  Arm circumference: adults 10 cm  Extremity circumference: 28  Visible catheter length: 1  Internal length: 14 cm  Total catheter length: 15  Dressing and securement: blood removed, chlorhexidine patch applied,   occlusive dressing applied and securement device  Post procedure assessment: blood return through all ports  PROCEDURE   Patient Tolerance:  Patient tolerated the procedure well with no immediate   complications   CT Chest Pulmonary Embolism w Contrast    Narrative    CT CHEST PULMONARY EMBOLISM WITH CONTRAST 7/28/2022 2:39 PM    CLINICAL HISTORY: Chest pain. Hypoxia, right heart strain, evaluate  for PE.    TECHNIQUE: CT angiogram chest during arterial phase  injection IV  contrast. 2D and 3D MIP reconstructions were performed by the CT  technologist. Dose reduction techniques were used.     CONTRAST: 64mL [id number]    COMPARISON: April 10, 2022    FINDINGS:  ANGIOGRAM CHEST: Pulmonary arteries are normal caliber and negative  for pulmonary emboli. Thoracic aorta is negative for dissection. No CT  evidence of right heart strain.    LUNGS AND PLEURA: Minimal pleural effusion bilaterally right worse  than left. Minimal associated compressive atelectasis vs. less likely  infiltrate. Resection changes on the right noted. Moderate bronchial  wall thickening. Areas of emphysema evident.    MEDIASTINUM/AXILLAE: No adenopathy or aneurysm.    CORONARY ARTERY CALCIFICATIONS: Mild.    UPPER ABDOMEN: No acute findings.    MUSCULOSKELETAL: No frankly destructive bony lesions.      Impression    IMPRESSION:  1.  No pulmonary embolism demonstrated.  2.  Minimal bilateral effusions and associated probable compressive  atelectasis right worse than left.    FUENTES SIMON MD         SYSTEM ID:  [id number]     *Note: Due to a large number of results and/or encounters for the requested time period, some results have not been displayed. A complete set of results can be found in Results Review.     EKG results: Normal sinus rhythm with right bundle branch block and first-degree AV block.

## 2022-07-28 NOTE — PLAN OF CARE
Goal Outcome Evaluation:        Pt. Alert, oriented x 2-3; disoriented to time, situation. [place], amplifier at bedside. 2LNC. Up with assist of 2, david steady.  R knee aquacel CDI. RLE trace edema, 1+ pedal pulses, cap refill intact. Moderate dorsi/plantar flexion. Tolerating PO. Voiding per urinal--see I&O.

## 2022-07-29 ENCOUNTER — APPOINTMENT (OUTPATIENT)
Dept: PHYSICAL THERAPY | Facility: CLINIC | Age: 87
DRG: 469 | End: 2022-07-29
Attending: ORTHOPAEDIC SURGERY
Payer: COMMERCIAL

## 2022-07-29 LAB
ANION GAP SERPL CALCULATED.3IONS-SCNC: 7 MMOL/L (ref 3–14)
BUN SERPL-MCNC: 24 MG/DL (ref 7–30)
CALCIUM SERPL-MCNC: 8.6 MG/DL (ref 8.5–10.1)
CHLORIDE BLD-SCNC: 110 MMOL/L (ref 94–109)
CO2 SERPL-SCNC: 23 MMOL/L (ref 20–32)
CREAT SERPL-MCNC: 0.95 MG/DL (ref 0.66–1.25)
GFR SERPL CREATININE-BSD FRML MDRD: 77 ML/MIN/1.73M2
GLUCOSE BLD-MCNC: 109 MG/DL (ref 70–99)
LACTATE SERPL-SCNC: 0.8 MMOL/L (ref 0.7–2)
POTASSIUM BLD-SCNC: 4 MMOL/L (ref 3.4–5.3)
SODIUM SERPL-SCNC: 140 MMOL/L (ref 133–144)
TROPONIN I SERPL HS-MCNC: 209 NG/L

## 2022-07-29 PROCEDURE — 250N000013 HC RX MED GY IP 250 OP 250 PS 637: Performed by: HOSPITALIST

## 2022-07-29 PROCEDURE — 99232 SBSQ HOSP IP/OBS MODERATE 35: CPT | Mod: 25 | Performed by: INTERNAL MEDICINE

## 2022-07-29 PROCEDURE — 97530 THERAPEUTIC ACTIVITIES: CPT | Mod: GP

## 2022-07-29 PROCEDURE — 250N000013 HC RX MED GY IP 250 OP 250 PS 637: Performed by: ORTHOPAEDIC SURGERY

## 2022-07-29 PROCEDURE — 120N000001 HC R&B MED SURG/OB

## 2022-07-29 PROCEDURE — 99233 SBSQ HOSP IP/OBS HIGH 50: CPT | Performed by: INTERNAL MEDICINE

## 2022-07-29 PROCEDURE — 93005 ELECTROCARDIOGRAM TRACING: CPT

## 2022-07-29 PROCEDURE — 82310 ASSAY OF CALCIUM: CPT | Performed by: INTERNAL MEDICINE

## 2022-07-29 PROCEDURE — 999N000190 HC STATISTIC VAT ROUNDS

## 2022-07-29 PROCEDURE — 250N000009 HC RX 250: Performed by: HOSPITALIST

## 2022-07-29 PROCEDURE — 84484 ASSAY OF TROPONIN QUANT: CPT | Performed by: INTERNAL MEDICINE

## 2022-07-29 PROCEDURE — 250N000013 HC RX MED GY IP 250 OP 250 PS 637: Performed by: INTERNAL MEDICINE

## 2022-07-29 PROCEDURE — 250N000009 HC RX 250: Performed by: PHYSICIAN ASSISTANT

## 2022-07-29 PROCEDURE — 83605 ASSAY OF LACTIC ACID: CPT | Performed by: INTERNAL MEDICINE

## 2022-07-29 PROCEDURE — 250N000013 HC RX MED GY IP 250 OP 250 PS 637: Performed by: PHYSICIAN ASSISTANT

## 2022-07-29 RX ORDER — TORSEMIDE 10 MG/1
10 TABLET ORAL DAILY
Qty: 30 TABLET | Refills: 3 | Status: SHIPPED | OUTPATIENT
Start: 2022-07-30 | End: 2022-08-23

## 2022-07-29 RX ORDER — TORSEMIDE 10 MG/1
10 TABLET ORAL DAILY
DISCHARGE
Start: 2022-07-30 | End: 2022-07-29

## 2022-07-29 RX ORDER — METOPROLOL TARTRATE 1 MG/ML
2.5 INJECTION, SOLUTION INTRAVENOUS ONCE
Status: COMPLETED | OUTPATIENT
Start: 2022-07-29 | End: 2022-07-29

## 2022-07-29 RX ORDER — METOPROLOL SUCCINATE 25 MG/1
12.5 TABLET, EXTENDED RELEASE ORAL 2 TIMES DAILY
Qty: 30 TABLET | Refills: 3 | Status: SHIPPED | OUTPATIENT
Start: 2022-07-29 | End: 2022-09-09

## 2022-07-29 RX ORDER — TORSEMIDE 10 MG/1
10 TABLET ORAL DAILY
Status: DISCONTINUED | OUTPATIENT
Start: 2022-07-29 | End: 2022-07-31 | Stop reason: HOSPADM

## 2022-07-29 RX ORDER — METOPROLOL SUCCINATE 25 MG/1
12.5 TABLET, EXTENDED RELEASE ORAL 2 TIMES DAILY
DISCHARGE
Start: 2022-07-29 | End: 2022-07-29

## 2022-07-29 RX ADMIN — DULOXETINE 60 MG: 60 CAPSULE, DELAYED RELEASE ORAL at 08:51

## 2022-07-29 RX ADMIN — METOPROLOL SUCCINATE 12.5 MG: 25 TABLET, EXTENDED RELEASE ORAL at 20:55

## 2022-07-29 RX ADMIN — FERROUS SULFATE TAB 325 MG (65 MG ELEMENTAL FE) 325 MG: 325 (65 FE) TAB at 08:51

## 2022-07-29 RX ADMIN — METOPROLOL TARTRATE 2.5 MG: 5 INJECTION INTRAVENOUS at 05:42

## 2022-07-29 RX ADMIN — FERROUS SULFATE TAB 325 MG (65 MG ELEMENTAL FE) 325 MG: 325 (65 FE) TAB at 20:55

## 2022-07-29 RX ADMIN — APIXABAN 5 MG: 5 TABLET, FILM COATED ORAL at 08:51

## 2022-07-29 RX ADMIN — FLUTICASONE FUROATE AND VILANTEROL TRIFENATATE 1 PUFF: 200; 25 POWDER RESPIRATORY (INHALATION) at 08:52

## 2022-07-29 RX ADMIN — METOPROLOL TARTRATE 2.5 MG: 5 INJECTION INTRAVENOUS at 04:22

## 2022-07-29 RX ADMIN — TORSEMIDE 10 MG: 10 TABLET ORAL at 14:18

## 2022-07-29 RX ADMIN — APIXABAN 5 MG: 5 TABLET, FILM COATED ORAL at 20:55

## 2022-07-29 RX ADMIN — METOPROLOL SUCCINATE 12.5 MG: 25 TABLET, EXTENDED RELEASE ORAL at 08:51

## 2022-07-29 RX ADMIN — ATORVASTATIN CALCIUM 10 MG: 10 TABLET, FILM COATED ORAL at 08:51

## 2022-07-29 RX ADMIN — GABAPENTIN 600 MG: 300 CAPSULE ORAL at 21:00

## 2022-07-29 RX ADMIN — FAMOTIDINE 40 MG: 20 TABLET ORAL at 08:51

## 2022-07-29 ASSESSMENT — ACTIVITIES OF DAILY LIVING (ADL)
ADLS_ACUITY_SCORE: 38
ADLS_ACUITY_SCORE: 38
ADLS_ACUITY_SCORE: 39
ADLS_ACUITY_SCORE: 38
ADLS_ACUITY_SCORE: 37

## 2022-07-29 NOTE — PROGRESS NOTES
X cover 0528    - called for HR>120s despite a dose of IV Metoprolol  - will give another dose of 2.5 mg IV  - also note rounding hospitalist's note to continue Toprol XL 12.5 mg daily, I see that the order was previously d/chris, not sure why; also no rate controlling meds noted on PTA med list   - will resume Toprol XL 12.5 mg daily with holding parameters

## 2022-07-29 NOTE — PROGRESS NOTES
[facility] Cardiology Progress Note  Date of Service: 07/29/2022  Primary Cardiologist: Dr. Grijalva    Hermilo Velasquez is a 89 year old male with a history of PE in 2016 and subsequent RV dysfunction which ultimately resolved. He was admitted on 7/25/2022 for elective TKA. Post-op course was complicated by hypotension, found to have recurrent acute RV dysfunction/dilation on TTE.    Assessment:  1. Status post elective right knee total arthroplasty on 7/25/2022.  2. Acute cor pulmonale.    -- Possibly secondary to anesthesia vs fat embolism.   -- No evidence of acute volume overload on exam, but requiring supplemental O2.  3. Hx of bilateral PE in 2016 and RLL PE in 2020.  4. PAF. Has been started back on Eliquis per surgical team.  5. Severe aortic stenosis s/p bioprosthetic AVR (2015). Normal function per TTE.  6. No obstructive CAD per 2015 angiogram.  7. COPD.  8. Confusion reportedly since surgery.    Plan:   1. Gentle diuresis with oral torsemide 10 mg daily. He has an allergy (rash) to furosemide, so please monitor for development of this with torsemide. If this occurs, would stop torsemide and start ethacrynic acid.   2. Will arrange a follow up TTE in one month, and have him see Dr. Grijalva in clinic after that.  3. Cardiology will sign off.    Plan was formulated under direction of Dr. Moses.   Joellen Chinchilla PA-C  [facility]  Pager: [phone]  Text Page  (7:30am - 4pm M-F)   __________________________________________________________________________  Physical Exam   Temp: 97.4  F (36.3  C) Temp src: Oral BP: (!) 134/90 Pulse: (!) 133   Resp: 20 SpO2: 97 % O2 Device: Nasal cannula Oxygen Delivery: 3 LPM  Vitals:    07/25/22 0854 07/26/22 0201   Weight: 70.4 kg (155 lb 4.8 oz) 75.8 kg (167 lb 1.7 oz)       GENERAL:  The patient is in no apparent distress.   NECK: CVP appears normal, no masses or thyromegaly.  PULMONARY:  There is a normal respiratory effort.  Clear lungs to auscultation bilaterally.   CARDIOVASCULAR:  RRR, normal S1 S2, no m/r/g.  GI:  Non tender abdomen with normoactive bowel sounds and no hepatosplenomegaly. There are no masses palpable.   EXTREMITIES:  No clubbing, cyanosis or edema.  VASCULAR: 2+ Pulses bilaterally in upper and lower extremities.    Medications       apixaban ANTICOAGULANT  5 mg Oral BID     atorvastatin  10 mg Oral Daily     DULoxetine  60 mg Oral Daily     famotidine  40 mg Oral Daily     ferrous sulfate  325 mg Oral BID     fluticasone-vilanterol  1 puff Inhalation Daily     gabapentin  600 mg Oral At Bedtime     metoprolol succinate ER  12.5 mg Oral Daily     polyethylene glycol  17 g Oral Daily     senna-docusate  1 tablet Oral BID     sodium chloride (PF)  10 mL Intracatheter Q8H       Data   Most Recent 3 CBC's:Recent Labs   Lab Test 07/28/22  1643 07/28/22  0720 07/27/22  0719   WBC 12.9* 15.1* 13.1*   HGB 9.5* 9.9* 9.5*   MCV 89 88 92    151 140*     Most Recent 3 BMP's:Recent Labs   Lab Test 07/29/22  0608 07/28/22  0720 07/27/22  0719    139 141   POTASSIUM 4.0 3.8 4.7   CHLORIDE 110* 111* 112*   CO2 23 23 22   BUN 24 41* 37*   CR 0.95 1.31* 1.67*   ANIONGAP 7 5 7   AMRITA 8.6 8.5 7.8*   * 114* 105*

## 2022-07-29 NOTE — PLAN OF CARE
Goal Outcome Evaluation:    Alert and oriented to self only. VSS. CMS intact, Denied pain, impulsive x1. Up with assist of 2 with GB/walker to chair x1, turn/repos in bed. Voiding adequately in urinal/incontinence at times. Continue to monitor.

## 2022-07-29 NOTE — PROGRESS NOTES
Care Management Follow Up    Length of Stay (days): 4    Expected Discharge Date: 07/30/2022     Concerns to be Addressed:       Patient plan of care discussed at interdisciplinary rounds: Yes    Anticipated Discharge Disposition: Transitional Care     Anticipated Discharge Services: None  Anticipated Discharge DME: None    Patient/family educated on Medicare website which has current facility and service quality ratings: yes  Education Provided on the Discharge Plan:    Patient/Family in Agreement with the Plan: yes    Referrals Placed by CM/SW: Transportation, Post Acute Facilities, Senior Linkage Line  Private pay costs discussed: NA    Additional Information:  Writer confirmed with Dr. Trinh that patient is not ready to discharge today and is anticipated for discharge tomorrow. Writer updated [facility] and will coordinate when patient is medically ready.      SY Ramsey

## 2022-07-29 NOTE — PROGRESS NOTES
Alert and oriented to self only. VSS. Denied pain. Calm and sleeping, refused to some assessments, refused iron and senna at HS. Midline to RUE with blood return. Up with assist of 2/ david riddle. Will continue to monitor.

## 2022-07-29 NOTE — PROGRESS NOTES
[facility]    Internal Medicine Hospitalist Progress Note  07/29/2022  I evaluated patient on the above date.    Leland Trinh Jr., MD  [phone] (p)  Text Page  Vocera        Assessment & Plan New actions/orders today (07/29/2022) are underlined.    Hermilo Velasquez is a very pleasant 89 year old male with a past medical history including COPD; AS s/p AVR; AF; AAA; h/o lung cancer s/p resection; h/o NHL s/p chemotherapy; h/o DVT/PE; who underwent elective R /2022. IM Hospitalist service consulted for medical comanagement.    Status post right total knee arthroplasty 7/25/2022.  * Tramadol stopped 7/26 given oversedation.  - Post-op management per Ortho.  - Continue acetaminophen 975 mg q8h; PRN acetaminophen; minimize opioids as able.  - Continue apixaban 5 mg BID per Ortho (increased 7/29).  - Continue PT, OT.    New right heart failure, unclear etiology, question related to stress from anesthesia, not felt to be ACS.  Acute hypoxic respiratory failure, suspect related to above.  Recent hypotension, question related right heart failure or medication related or combination.  Troponin elevation, suspect demand ischemia (type 2 NSTEMI).  * 7/26: Afternoon pt noted to be drowsy/lethargic with BP's 70's (had received tramadol earlier in the morning). Tramadol stopped. Given IVF's with improvement in BP's to 90's systolic.  * 7/27: BP's stable in 90's. Subsequently was hypoxic up to 5L while pt up in chair; no chest pain. Sats improved after being placed back into bed. ECG showed sinus with RBBB (RBBB not new). CXR 7/27 showed low lung volumes; mild atelectasis/infiltrate LLL. Echo 7/27 showed LVEF 55-60%; RV severely dilated with severely decreased RV systolic function; flattened septum is consistent with RV pressure/volume overload; bioprosthetic AVR (noted that gradient improved from 4/2021). Metoprolol XL held.  * 7/28: BP's in 110's systolic, still tachycardic, still requiring  O2. Creatinine improved. Right lower extremity US 7/28 negative for DVT. CT chest 7/28 negative for PE; minimal effusions with probable compressive atelectasis, R > L. Given RHF with negative PE, Cardiology consulted; felt could be related to anesthesia from surgery with decreased baseline reserve given other co-morbidities including COPD, afib. Trops checked and were mildly elevated, but flat, NTP-BNP 24897.   * 7/29: BP's improved. More tachy (see below) and metoprolol XL restarted. Started on torsemide by Cardiology (allergy/rash to furosemide).  - Starting torsemide 10 mg daily; monitor for rash; if develops rash, try ethacrynic acid.  - Continue metoprolol XL.  - Monitor i/o's, daily wts.  - Consider stress test outpatient.  - Management of afib/flutter as below.  - Appreciate Cardiology help.    Atrial flutter with RVR.   Chronic atrial fibrillation on chronic anticoagulation.  * PTA on apixaban. On 7/25, noted that pt had been on low dose metoprolol XL, taken off med list, however did not seen in any notes that this was to be discontinued. It was d/w pt's wife and she [facility], unclear of the dose.   * Started on metoprolol XL 12.5 mg daily 7/26.  * ECG 7/27 showed sinus with RBBB. Metoprolol XL held 7/27 due to tachycardia.  * Developed tachycardia am 7/29; ECG showed atrial flutter with RVR, RBBB. HR's improved after restarting metoprolol XL.  - Continue to monitor on telemetry.  - Increase metoprolol XL 12.5 mg daily --> BID.  - Continue apixaban 5 mg BID.  - Continue PRN IV metoprolol for sustained heart rate >120.  - Monitor K+/Mg++, replace PRN.    Blood loss anemia on chronic iron deficiency anemia.  * Hx PATIENCE and occult GI bleeding. Hgb as low as 6.8 on 4/11/2022 requiring transfusion. EGD 4/12/2022 showed erosive gastropathy but no stigmata of bleed. Colonoscopy 4/12/2022 showed diverticulosis and multiple AVM but no source of bleed, they were treated. Since then he has  been primarily 9-10 range. Received Iron infusions. Most recent Hgb 12.1 6/27/2022.  * Hgb 10.4 on 7/26.  - Monitor CBC.  - Consider prbc transfusion if hgb </= 7.0 or if significant bleeding with hemodynamic instability or if symptomatic.    Report of skin tears s/p recent fall.  Frequent falls.  * Of note, was admitted in 5/2022 with right knee pain s/p fall, with right knee effusion and hemarthrosis in setting of DOAC.   - Continue local wound cares per [facility] to evaluate skin tears    COPD.  - Continue fluticasone-vilanterol; PRN albuterol.  - Monitor pulse oximetry/capnography during this hospitalization per protocol.    FRED, suspect prerenal related to hypotension from RHF, on chronic kidney disease, stage 3a.  * Baseline Cr 1.1-1.3.   * Cr on admission, 1.33.  * Issues with low BP's 7/26, given IVF's.  * Cr increased to 1.67 7/27.  * Cr improved 7/28.   * Cr normalized 7/29.  - Continue IVF's.  - Monitor BMP.  - Avoid nephrotoxic medications.    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  AMS, suspect metabolic and toxic encephalopathy.  * On 7/25, noted that no hx dementia/cognitive impairment, but clearly has cognitive deficits versus is just very hard of hearing during prior admissions.   * On 7/26, pt lethargic as above, associated with low BP's; tramadol stopped.  * On 7/27 mentation improved.  * On 7/28, confused, agitated overnight.   - Re-orient as needed.  - Maintain normal day/night, sleep/wake cycles.  - Minimize sedating medications as able.  - Patient could possibly benefit from getting neurocognitive evaluation in an outpatient setting if not already done.    Aortic stenosis s/p TAVR with bioprosthetic valve 2015.  * Coronary angiogram at that time was negative for obstructive CAD. TTE 4/2021 showed preserved LVEF with mild to moderate prosthetic valve stenosis.   - Monitor clinically.    Chronic peripheral neuropathy.  - Continue PTA gabapentin 600 mg at bedtime.    Dyslipidemia.  * Chronic and  stable.  - Continue atorvastatin.    GERD.  * Chronic and stable.  - Continue famotidine.    Bullous pemphigoid and chronic pruritus.  - Continue hydroxyzine.    MGUS.  History of stage 4 Non-Hodgkins Lymphoma in remission  * Followed by Dr. Zurita of [facility]. His lymphoma was diagnosed and treated with systemic chemotherapy in 2012 and reportedly has been in remission since. His MGUS is being monitored as an outpatient.  - Follow-up outpatient.    AAA.  * CT in 4/2022 showed a 3.5 cm saccular infrarenal abdominal aortic aneurysm.  - Monitor outpatient.    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  - Continue apixaban.    History of lung cancer status post wedge resection 2019.  - Noted.      Clinically Significant Risk Factors Present on Admission                         COVID-19 testing.  COVID-19 PCR Results    COVID-19 PCR Results 8/15/20 9/13/20 9/13/20 10/23/20 10/23/20 4/13/21 3/5/22 4/10/22 5/8/22 7/22/22     1256 1256 1702 1702        COVID-19 Virus PCR to [facility] - Result Not Detected Test received-See reflex to IDDL test SARS CoV2 (COVID-19) Virus RT-PCR  Test received-See reflex to IDDL test SARS CoV2 (COVID-19) Virus RT-PCR         COVID-19 Virus PCR to [facility] - Source Nasopharyngeal Nasopharyngeal  Nasopharyngeal         COVID-19 Virus by PCR (External Result)       Not Detected      SARS-CoV-2 Virus Specimen Source   Nasopharyngeal  Nasopharyngeal        Flu A/B & SARS-COV-2 PCR Source      Nasopharyngeal       SARS-CoV-2 PCR Result   NEGATIVE  NEGATIVE NEGATIVE       SARS CoV2 PCR        Negative Negative Negative      Comments are available for some flowsheets but are not being displayed.         COVID-19 Antibody Results, Testing for Immunity    COVID-19 Antibody Results, Testing for Immunity   No data to display.             Diet: Regular Diet Adult  Low Saturated Fat Na <2400 mg  Diet    Prophylaxis: PCD's, ambulation. On apixaban.  Suazo Catheter:  "Not present  Central Lines: PRESENT       Code Status: Full Code    Disposition Plan   Expected discharge: 1-2d recommended to transitional care unit pending above; not ready from medical standpoint 7/29 for discharge; possibly could go to TCU tomorrow 7/30 if continues to improve, heart rates stable.  Entered: Leland Trinh MD 07/29/2022, 3:26 PM     Communication.  - I d/w pt's wife and daughter 7/29.  - I d/w Ortho 7/28.      Interval History   Developed atrial flutter with RVR this am.  Improved after restarting metoprolol XL.    -Data reviewed today: I reviewed all new labs and imaging over the last 24 hours. I personally reviewed the EKG tracing showing atrial flutter with RVR, RBBB, no acute ischemic changes.    Physical Exam    , Blood pressure 132/75, pulse 97, temperature 98.1  F (36.7  C), temperature source Axillary, resp. rate 20, height 1.778 m (5' 10\"), weight 75.8 kg (167 lb 1.7 oz), SpO2 100 %. O2 Device: Nasal cannula Oxygen Delivery: 3 LPM  Vitals:    07/25/22 0854 07/26/22 0201   Weight: 70.4 kg (155 lb 4.8 oz) 75.8 kg (167 lb 1.7 oz)     Vital Signs with Ranges  Temp:  [97.4  F (36.3  C)-99.2  F (37.3  C)] 98.1  F (36.7  C)  Pulse:  [] 97  Resp:  [18-20] 20  BP: (118-152)/(71-90) 132/75  SpO2:  [92 %-100 %] 100 %  Patient Vitals for the past 24 hrs:   BP Temp Temp src Pulse Resp SpO2   07/29/22 1141 132/75 98.1  F (36.7  C) Axillary 97 20 100 %   07/29/22 1000 -- -- -- -- 20 97 %   07/29/22 0829 (!) 134/90 97.4  F (36.3  C) Oral (!) 133 20 97 %   07/29/22 0756 118/78 -- -- 118 20 92 %   07/29/22 0700 126/77 98.3  F (36.8  C) Oral (!) 130 18 99 %   07/29/22 0500 124/79 -- -- (!) 132 -- --   07/29/22 0420 118/75 -- -- (!) 130 -- --   07/29/22 0100 (!) 152/82 -- -- 101 18 97 %   07/28/22 2038 139/71 99.2  F (37.3  C) Axillary 89 18 100 %     I/O's Last 24 hours  I/O last 3 completed shifts:  In: 360 [P.O.:360]  Out: 600 [Urine:600]    Constitutional: Easily arousable and answers simple " questions.  Respiratory: Diminished in bases. Crackles in right base.  Cardiovascular: RRR, no m/r/g.  GI: Soft, nt, nd, +BS.  Skin/Integumen: Trace to 1+ right lower extremity edema.  Other:        Data   Recent Labs   Lab 07/29/22  0608 07/28/22  1651 07/28/22  1643 07/28/22  0720 07/27/22  0719   WBC  --   --  12.9* 15.1* 13.1*   HGB  --   --  9.5* 9.9* 9.5*   MCV  --   --  89 88 92   PLT  --   --  150 151 140*     --   --  139 141   POTASSIUM 4.0  --   --  3.8 4.7   CHLORIDE 110*  --   --  111* 112*   CO2 23  --   --  23 22   BUN 24  --   --  41* 37*   CR 0.95  --   --  1.31* 1.67*   ANIONGAP 7  --   --  5 7   AMRITA 8.6  --   --  8.5 7.8*   *  --   --  114* 105*   TROPONINIS 209* 339*  --   --   --      Recent Labs   Lab Test 07/29/22  0608 07/28/22  0720 07/27/22  0719 07/26/22  0703 07/26/22  0158 09/15/20  0735 09/15/20  0200 03/29/20  0611 03/29/20  0157 03/25/20  0740 03/24/20  2206 03/24/20  1826 03/23/20  0459 03/23/20  0431   * 114* 105* 105*  105* 108*   < >  --    < >  --    < >  --   --    < >  --    BGM  --   --   --   --   --   --  163*  --  103*  --  120* 121*  --  131*    < > = values in this interval not displayed.     Recent Labs   Lab 07/29/22  0809 07/28/22  1643 07/28/22  0812 07/28/22  0720 07/27/22  0719 07/26/22  1024   WBC  --  12.9*  --  15.1* 13.1*  --    LACT 0.8  --  0.9  --   --  1.8         No results found for this or any previous visit (from the past 24 hour(s)).    Medications   All medications were reviewed.      apixaban ANTICOAGULANT  5 mg Oral BID     atorvastatin  10 mg Oral Daily     DULoxetine  60 mg Oral Daily     famotidine  40 mg Oral Daily     ferrous sulfate  325 mg Oral BID     fluticasone-vilanterol  1 puff Inhalation Daily     gabapentin  600 mg Oral At Bedtime     metoprolol succinate ER  12.5 mg Oral Daily     polyethylene glycol  17 g Oral Daily     senna-docusate  1 tablet Oral BID     sodium chloride (PF)  10 mL Intracatheter Q8H      torsemide  10 mg Oral Daily     acetaminophen, albuterol, albuterol, sore throat lozenge, benztropine, bisacodyl, haloperidol lactate, hydrALAZINE, lidocaine (buffered or not buffered), magnesium hydroxide, metoprolol, naloxone **OR** naloxone **OR** naloxone **OR** naloxone, ondansetron **OR** ondansetron, prochlorperazine **OR** prochlorperazine, sodium chloride (PF), sodium chloride (PF)

## 2022-07-29 NOTE — PROVIDER NOTIFICATION
.MD Notification    Notified Person: Tiago Maloney    Notified Person Name:    Notification Date/Time: 07/29/2022 0517    Notification Interaction: amcom    Purpose of Notification: Elevated HR    Orders Received:    Comments:

## 2022-07-29 NOTE — PROGRESS NOTES
Alert and oriented x2. Up with 2 david steady. Aquacel dressing intact. Foam dressings on bilateral arms intact. On Tele sinus tachy. On call paged for increase HR  even after PRN Metoprolol. On call replied with orders. Incontinent of B/B. Had small BM this shift. Midline on R arm intact and patent, sl. Will continue to monitor.

## 2022-07-29 NOTE — PROGRESS NOTES
"ORTHOPEDIC LOWER EXTREMITY PROGRESS NOTE    POD#4  Patient is a 89 year old male who underwent Procedure(s):  RIGHT TOTAL KNEE ARTHROPLASTY on 2022. Pain is well controlled on tylenol. Still tachycardic, medicine recommends Toprol 12.5 mg daily.  Now on full dose of eliquis. No new issues this am.    Vitals:   Blood pressure (!) 134/90, pulse (!) 133, temperature 97.4  F (36.3  C), temperature source Oral, resp. rate 20, height 1.778 m (5' 10\"), weight 75.8 kg (167 lb 1.7 oz), SpO2 97 %.  Temp (24hrs), Av.3  F (36.3  C), Min:96  F (35.6  C), Max:98.1  F (36.7  C)      Drains: none    EXAM   The patient is awake and alert.  Answers questions appropriately, lying in bed  Calves are soft and non-tender.   Sensation is intact.  Dorsiflexion and plantar flexion is intact.  Foot warm with nl cap refill.  The incision is covered with AG dressing with no shadow    Labs:   Recent Labs   Lab Test 22  1643 22  0720 22  0719 22  2336 22  1223 10/24/20  0037 10/23/20  1617 20  0829 20  0948   HGB 9.5* 9.9* 9.5*   < > 11.2*   < > 6.6*   < > 7.7*   INR  --   --   --   --  1.99*  --  1.39*  --  1.21*    < > = values in this interval not displayed.       ASSESSMENT  S/p R TKA   PLAN  1. DVT prophylaxis: Eliquis upped to PTA dose by Dr Deshpande  2. Weight Bearing: WBAT (Weight bearing as tolerated).  3. Anticipated discharge date pending medical clearance. Discharge to [facility].  SW following, appreciate assistance  4. Cont Pain Control tylenol.  No narcotics       Kjerstin Foss PA-C [facility]        "

## 2022-07-29 NOTE — PLAN OF CARE
----- Message from Cathleen Cotter sent at 1/20/2017 11:37 AM CST -----  Contact: [phone]  DR. CENTENO CALLED MOM LAST NIGHT AND SAID THAT SHE SENT [facility]AN OVER TO [facility] BUT [facility] IS SAYING THEY DON'T HAVE IT.    Goal Outcome Evaluation:    Plan of Care Reviewed With: patient, spouse     Overall Patient Progress: improving    A&O to self, very [place], has pocket talker at bedside. Content between cares, watching tv or resting, impulsive x1. Up with assist of 2 with GB/walker to chair x1, turn/repos in bed, floating heels on pillows, beneath calf only for R heel. VSS on 3L, wears O2 at night at home per wife. Tele shows Sinus tach, start torsemide, Cards signed off, trop checks stopped. POD 4: R knee drsg CDI, knee and ankle edema present.

## 2022-07-30 ENCOUNTER — APPOINTMENT (OUTPATIENT)
Dept: PHYSICAL THERAPY | Facility: CLINIC | Age: 87
DRG: 469 | End: 2022-07-30
Attending: ORTHOPAEDIC SURGERY
Payer: COMMERCIAL

## 2022-07-30 ENCOUNTER — HEALTH MAINTENANCE LETTER (OUTPATIENT)
Age: 87
End: 2022-07-30

## 2022-07-30 LAB
ANION GAP SERPL CALCULATED.3IONS-SCNC: 3 MMOL/L (ref 3–14)
BUN SERPL-MCNC: 23 MG/DL (ref 7–30)
CALCIUM SERPL-MCNC: 8.8 MG/DL (ref 8.5–10.1)
CHLORIDE BLD-SCNC: 107 MMOL/L (ref 94–109)
CO2 SERPL-SCNC: 28 MMOL/L (ref 20–32)
CREAT SERPL-MCNC: 0.87 MG/DL (ref 0.66–1.25)
GFR SERPL CREATININE-BSD FRML MDRD: 82 ML/MIN/1.73M2
GLUCOSE BLD-MCNC: 131 MG/DL (ref 70–99)
POTASSIUM BLD-SCNC: 3.5 MMOL/L (ref 3.4–5.3)
SODIUM SERPL-SCNC: 138 MMOL/L (ref 133–144)

## 2022-07-30 PROCEDURE — 97110 THERAPEUTIC EXERCISES: CPT | Mod: GP

## 2022-07-30 PROCEDURE — 250N000013 HC RX MED GY IP 250 OP 250 PS 637: Performed by: PHYSICIAN ASSISTANT

## 2022-07-30 PROCEDURE — 250N000013 HC RX MED GY IP 250 OP 250 PS 637: Performed by: INTERNAL MEDICINE

## 2022-07-30 PROCEDURE — 250N000013 HC RX MED GY IP 250 OP 250 PS 637: Performed by: ORTHOPAEDIC SURGERY

## 2022-07-30 PROCEDURE — 999N000040 HC STATISTIC CONSULT NO CHARGE VASC ACCESS

## 2022-07-30 PROCEDURE — 99232 SBSQ HOSP IP/OBS MODERATE 35: CPT | Performed by: HOSPITALIST

## 2022-07-30 PROCEDURE — 80048 BASIC METABOLIC PNL TOTAL CA: CPT | Performed by: INTERNAL MEDICINE

## 2022-07-30 PROCEDURE — 999N000190 HC STATISTIC VAT ROUNDS

## 2022-07-30 PROCEDURE — 97530 THERAPEUTIC ACTIVITIES: CPT | Mod: GP

## 2022-07-30 PROCEDURE — 120N000001 HC R&B MED SURG/OB

## 2022-07-30 RX ADMIN — APIXABAN 5 MG: 5 TABLET, FILM COATED ORAL at 08:25

## 2022-07-30 RX ADMIN — SENNOSIDES AND DOCUSATE SODIUM 1 TABLET: 50; 8.6 TABLET ORAL at 21:27

## 2022-07-30 RX ADMIN — FLUTICASONE FUROATE AND VILANTEROL TRIFENATATE 1 PUFF: 200; 25 POWDER RESPIRATORY (INHALATION) at 10:18

## 2022-07-30 RX ADMIN — APIXABAN 5 MG: 5 TABLET, FILM COATED ORAL at 21:27

## 2022-07-30 RX ADMIN — METOPROLOL SUCCINATE 12.5 MG: 25 TABLET, EXTENDED RELEASE ORAL at 21:27

## 2022-07-30 RX ADMIN — FERROUS SULFATE TAB 325 MG (65 MG ELEMENTAL FE) 325 MG: 325 (65 FE) TAB at 21:27

## 2022-07-30 RX ADMIN — METOPROLOL SUCCINATE 12.5 MG: 25 TABLET, EXTENDED RELEASE ORAL at 08:25

## 2022-07-30 RX ADMIN — DULOXETINE 60 MG: 60 CAPSULE, DELAYED RELEASE ORAL at 08:25

## 2022-07-30 RX ADMIN — GABAPENTIN 600 MG: 300 CAPSULE ORAL at 21:27

## 2022-07-30 RX ADMIN — POLYETHYLENE GLYCOL 3350 17 G: 17 POWDER, FOR SOLUTION ORAL at 08:25

## 2022-07-30 RX ADMIN — FERROUS SULFATE TAB 325 MG (65 MG ELEMENTAL FE) 325 MG: 325 (65 FE) TAB at 08:25

## 2022-07-30 RX ADMIN — FAMOTIDINE 40 MG: 20 TABLET ORAL at 08:25

## 2022-07-30 RX ADMIN — ATORVASTATIN CALCIUM 10 MG: 10 TABLET, FILM COATED ORAL at 08:25

## 2022-07-30 RX ADMIN — TORSEMIDE 10 MG: 10 TABLET ORAL at 08:25

## 2022-07-30 RX ADMIN — SENNOSIDES AND DOCUSATE SODIUM 1 TABLET: 50; 8.6 TABLET ORAL at 08:25

## 2022-07-30 ASSESSMENT — ACTIVITIES OF DAILY LIVING (ADL)
ADLS_ACUITY_SCORE: 41
ADLS_ACUITY_SCORE: 45
ADLS_ACUITY_SCORE: 45
ADLS_ACUITY_SCORE: 39
ADLS_ACUITY_SCORE: 45
ADLS_ACUITY_SCORE: 41
ADLS_ACUITY_SCORE: 41
ADLS_ACUITY_SCORE: 45
ADLS_ACUITY_SCORE: 41
ADLS_ACUITY_SCORE: 45
ADLS_ACUITY_SCORE: 41
ADLS_ACUITY_SCORE: 41

## 2022-07-30 NOTE — PLAN OF CARE
Goal Outcome Evaluation:    Patient vital signs are at baseline: Yes  Patient able to ambulate as they were prior to admission or with assist devices provided by therapies during their stay:  No,  Reason:  patient confused and general weakness.  Patient MUST void prior to discharge:  Yes  Patient able to tolerate oral intake:  Yes  Pain has adequate pain control using Oral analgesics:  Yes  Does patient have an identified :  Yes  Has goal D/C date and time been discussed with patient:  Yes awaiting medically signed off to send TCU.     Alert and oriented to self only. VSS. CMS intact, Denied pain,sleep most of the time during the shift. Up with assist of 2 with GB/walker to chair x1, turn/repos in bed. . Continue to monitor.

## 2022-07-30 NOTE — PROGRESS NOTES
Ortho    Ready orthopedically  To  Go  To  Tcu  Await  finl decision from  Medical.    dont  See  Any pt notes   Are they  Not  Seeing him  For  Some  Reason  ??

## 2022-07-30 NOTE — PLAN OF CARE
Goal Outcome Evaluation:    Pt. A&O to self only. Minimal distress this shift. Sitter at bedside. Denies pain. Refused meds at times. Adequate I&O. Pt. can assist w/ turn & repo in bed. CMS intact per baseline. Dressing CDI. Anticipate TCU discharge today. Will cont' to monitor.

## 2022-07-30 NOTE — PROGRESS NOTES
"ORTHOPEDIC LOWER EXTREMITY PROGRESS NOTE    POD#5  Patient is a 89 year old male who underwent Procedure(s):  RIGHT TOTAL KNEE ARTHROPLASTY on 2022. Pain is decently controlled on tylenol. With a sitter this morning.  Discussed icing the knee and not resting with a pillow underneath it to avoid flexion contracture.    Vitals:   Blood pressure (!) 146/74, pulse 50, temperature 97.7  F (36.5  C), temperature source Oral, resp. rate 16, height 1.778 m (5' 10\"), weight 75.8 kg (167 lb 1.7 oz), SpO2 93 %.  Temp (24hrs), Av.3  F (36.3  C), Min:96  F (35.6  C), Max:98.1  F (36.7  C)      Drains: none    EXAM   The patient is awake and alert.  Answers questions appropriately, lying in bed  Calves are soft and non-tender.   Sensation is intact.  Dorsiflexion and plantar flexion is intact.  Foot warm with nl cap refill.  The incision is covered with AG dressing with no shadow    Labs:   Recent Labs   Lab Test 22  1643 22  0720 22  0719 22  2336 22  1223 10/24/20  0037 10/23/20  1617 20  0829 20  0948   HGB 9.5* 9.9* 9.5*   < > 11.2*   < > 6.6*   < > 7.7*   INR  --   --   --   --  1.99*  --  1.39*  --  1.21*    < > = values in this interval not displayed.       ASSESSMENT  S/p R TKA   PLAN  1. DVT prophylaxis: Eliquis upped to PTA dose by Dr Deshpande  2. Weight Bearing: WBAT (Weight bearing as tolerated).  3. Anticipated discharge date pending medical clearance. Discharge to [facility], [facility].  SW following, appreciate assistance  4. Cont Pain Control tylenol.  No narcotics   5. Continue with ice to the knee and avoid pillow underneath knee that keep it in a flexed position.      Kjerstin Foss PA-C [facility]        "

## 2022-07-30 NOTE — PROGRESS NOTES
Care Management Follow Up    Length of Stay (days): 5    Expected Discharge Date: 07/30/2022     Concerns to be Addressed:       Patient plan of care discussed at interdisciplinary rounds: Yes    Anticipated Discharge Disposition: Transitional Care     Anticipated Discharge Services: None  Anticipated Discharge DME: None    Patient/family educated on Medicare website which has current facility and service quality ratings: yes  Education Provided on the Discharge Plan:    Patient/Family in Agreement with the Plan: yes    Referrals Placed by CM/SW: Transportation, Post Acute Facilities, Senior Linkage Line  Private pay costs discussed: Not applicable    Additional Information:  Writer confirmed with [facility] that due to sitter noted in chart from today, patient will need to be off sitter at bedside for 24 hours prior to admission. Writer will follow for discharge for tomorrow if no sitter is present.    Addendum: [facility] can accept patient anytime Sunday. SW to follow up with weekend contact number if patient is ready to discharge 7/31/22.      SY Ramsey

## 2022-07-30 NOTE — PROGRESS NOTES
[facility]    Medicine Progress Note - Hospitalist Service    Date of Admission:  7/25/2022    Assessment & Plan          Hermilo Velasquez is a very pleasant 89 year old male with a past medical history including COPD; AS s/p AVR; AF; AAA; h/o lung cancer s/p resection; h/o NHL s/p chemotherapy; h/o DVT/PE; who underwent elective R /2022. IM Hospitalist service consulted for medical comanagement.     Status post right total knee arthroplasty 7/25/2022.  * Tramadol stopped 7/26 given oversedation.  - Post-op management per Ortho.  - Continue acetaminophen 975 mg q8h; PRN acetaminophen; minimize opioids as able.  - Continue apixaban 5 mg BID per Ortho (increased 7/29).  - Continue PT, OT.     New right heart failure, unclear etiology, question related to stress from anesthesia, not felt to be ACS.  Acute hypoxic respiratory failure, suspect related to above.  Recent hypotension, question related right heart failure or medication related or combination.  Troponin elevation, suspect demand ischemia (type 2 NSTEMI).  * 7/26: Afternoon pt noted to be drowsy/lethargic with BP's 70's (had received tramadol earlier in the morning). Tramadol stopped. Given IVF's with improvement in BP's to 90's systolic.  * 7/27: BP's stable in 90's. Subsequently was hypoxic up to 5L while pt up in chair; no chest pain. Sats improved after being placed back into bed. ECG showed sinus with RBBB (RBBB not new). CXR 7/27 showed low lung volumes; mild atelectasis/infiltrate LLL. Echo 7/27 showed LVEF 55-60%; RV severely dilated with severely decreased RV systolic function; flattened septum is consistent with RV pressure/volume overload; bioprosthetic AVR (noted that gradient improved from 4/2021). Metoprolol XL held.  * 7/28: BP's in 110's systolic, still tachycardic, still requiring O2. Creatinine improved. Right lower extremity US 7/28 negative for DVT. CT chest 7/28 negative for PE; minimal effusions with  probable compressive atelectasis, R > L. Given RHF with negative PE, Cardiology consulted; felt could be related to anesthesia from surgery with decreased baseline reserve given other co-morbidities including COPD, afib. Trops checked and were mildly elevated, but flat, NTP-BNP 22688.   * 7/29: BP's improved. More tachy (see below) and metoprolol XL restarted. Started on torsemide by Cardiology (allergy/rash to furosemide).  - Starting torsemide 10 mg daily; monitor for rash; if develops rash, try ethacrynic acid.  - Continue metoprolol XL.  - Monitor i/o's, daily wts.  - Consider stress test outpatient.  - Management of afib/flutter as below.  - Appreciate Cardiology help.     Atrial flutter with RVR.   Chronic atrial fibrillation on chronic anticoagulation.  * PTA on apixaban. On 7/25, noted that pt had been on low dose metoprolol XL, taken off med list, however did not seen in any notes that this was to be discontinued. It was d/w pt's wife and she thought [facility], unclear of the dose.   * Started on metoprolol XL 12.5 mg daily 7/26.  * ECG 7/27 showed sinus with RBBB. Metoprolol XL held 7/27 due to tachycardia.  * Developed tachycardia am 7/29; ECG showed atrial flutter with RVR, RBBB. HR's improved after restarting metoprolol XL.  - Continue to monitor on telemetry.  - Increase metoprolol XL 12.5 mg daily --> BID.  - Continue apixaban 5 mg BID.  - Continue PRN IV metoprolol for sustained heart rate >120.  - Monitor K+/Mg++, replace PRN.     Blood loss anemia on chronic iron deficiency anemia.  * Hx PATIENCE and occult GI bleeding. Hgb as low as 6.8 on 4/11/2022 requiring transfusion. EGD 4/12/2022 showed erosive gastropathy but no stigmata of bleed. Colonoscopy 4/12/2022 showed diverticulosis and multiple AVM but no source of bleed, they were treated. Since then he has been primarily 9-10 range. Received Iron infusions. Most recent Hgb 12.1 6/27/2022.  * Hgb 10.4 on 7/26.  - Monitor CBC.  -  Consider prbc transfusion if hgb </= 7.0 or if significant bleeding with hemodynamic instability or if symptomatic.     Report of skin tears s/p recent fall.  Frequent falls.  * Of note, was admitted in 5/2022 with right knee pain s/p fall, with right knee effusion and hemarthrosis in setting of DOAC.   - Continue local wound cares per [facility]OCN to evaluate skin tears     COPD.  - Continue fluticasone-vilanterol; PRN albuterol.  - Monitor pulse oximetry/capnography during this hospitalization per protocol.     FRED, suspect prerenal related to hypotension from RHF, on chronic kidney disease, stage 3a.  * Baseline Cr 1.1-1.3.   * Cr on admission, 1.33.  * Issues with low BP's 7/26, given IVF's.  * Cr increased to 1.67 7/27.  * Cr improved 7/28.   * Cr normalized 7/29.  - Continue IVF's.  - Monitor BMP.  - Avoid nephrotoxic medications.     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  AMS, suspect metabolic and toxic encephalopathy.  * On 7/25, noted that no hx dementia/cognitive impairment, but clearly has cognitive deficits versus is just very hard of hearing during prior admissions.   * On 7/26, pt lethargic as above, associated with low BP's; tramadol stopped.  * On 7/27 mentation improved.  * On 7/28, confused, agitated overnight.   - Re-orient as needed.  - Maintain normal day/night, sleep/wake cycles.  - Minimize sedating medications as able.  - Patient could possibly benefit from getting neurocognitive evaluation in an outpatient setting if not already done.     Aortic stenosis s/p TAVR with bioprosthetic valve 2015.  * Coronary angiogram at that time was negative for obstructive CAD. TTE 4/2021 showed preserved LVEF with mild to moderate prosthetic valve stenosis.   - Monitor clinically.     Chronic peripheral neuropathy.  - Continue PTA gabapentin 600 mg at bedtime.     Dyslipidemia.  * Chronic and stable.  - Continue atorvastatin.     GERD.  * Chronic and stable.  - Continue famotidine.     Bullous pemphigoid and  chronic pruritus.  - Continue hydroxyzine.     MGUS.  History of stage 4 Non-Hodgkins Lymphoma in remission  * Followed by Dr. Zurita of [facility]. His lymphoma was diagnosed and treated with systemic chemotherapy in 2012 and reportedly has been in remission since. His MGUS is being monitored as an outpatient.  - Follow-up outpatient.     AAA.  * CT in 4/2022 showed a 3.5 cm saccular infrarenal abdominal aortic aneurysm.  - Monitor outpatient.     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  - Continue apixaban.     History of lung cancer status post wedge resection 2019.  - Noted.    Rash on leg  Doubt a drug eruption    Monitor      Diet: Regular Diet Adult  Low Saturated Fat Na <2400 mg  Diet    DVT Prophylaxis: DOAC  Suazo Catheter: Not present  Central Lines: PRESENT     Cardiac Monitoring: ACTIVE order. Indication: hx afib, postop knee  Code Status: Full Code      Disposition Plan      Expected Discharge Date: 07/30/2022    Discharge Delays: Imaging Result Pending (enter specific test & time in comments)    Discharge Comments: TCU        The patient's care was discussed with the Patient.    Bennie Rodney MD  Hospitalist Service  [facility]  Securely message with the Vocera Web Console (learn more here)  Text page via basestone Paging/Directory         Clinically Significant Risk Factors Present on Admission                      ______________________________________________________________________    Interval History   No complaints. Has a mild rash on right leg near knee.    Data reviewed today: I reviewed all medications, new labs and imaging results over the last 24 hours. I personally reviewed no images or EKG's today.    Physical Exam   Vital Signs: Temp: 97.7  F (36.5  C) Temp src: Oral BP: (!) 146/74 Pulse: 50   Resp: 16 SpO2: 93 % O2 Device: None (Room air) Oxygen Delivery: 3 LPM  Weight: 167 lbs 1.74 oz  Constitutional: awake,  alert, cooperative, no apparent distress  Respiratory: No increased work of breathing, good air exchange, clear to auscultation bilaterally, no crackles or wheezing  Cardiovascular: regular rate and rhythm, normal S1 and S2, no S3 or S4, and no murmur noted  GI: normal bowel sounds, soft, non-distended, non-tender, no masses palpated  Skin: right leg medial to knee- petechial appearing lesions  Neuropsychiatric: General: normal, calm and normal eye contact      Data   Recent Labs   Lab 07/30/22  0604 07/29/22  0608 07/28/22  1643 07/28/22  0720 07/27/22  0719   WBC  --   --  12.9* 15.1* 13.1*   HGB  --   --  9.5* 9.9* 9.5*   MCV  --   --  89 88 92   PLT  --   --  150 151 140*    140  --  139 141   POTASSIUM 3.5 4.0  --  3.8 4.7   CHLORIDE 107 110*  --  111* 112*   CO2 28 23  --  23 22   BUN 23 24  --  41* 37*   CR 0.87 0.95  --  1.31* 1.67*   ANIONGAP 3 7  --  5 7   AMRITA 8.8 8.6  --  8.5 7.8*   * 109*  --  114* 105*     No results found for this or any previous visit (from the past 24 hour(s)).  Medications       apixaban ANTICOAGULANT  5 mg Oral BID     atorvastatin  10 mg Oral Daily     DULoxetine  60 mg Oral Daily     famotidine  40 mg Oral Daily     ferrous sulfate  325 mg Oral BID     fluticasone-vilanterol  1 puff Inhalation Daily     gabapentin  600 mg Oral At Bedtime     metoprolol succinate ER  12.5 mg Oral BID     polyethylene glycol  17 g Oral Daily     senna-docusate  1 tablet Oral BID     sodium chloride (PF)  10 mL Intracatheter Q8H     torsemide  10 mg Oral Daily

## 2022-07-31 ENCOUNTER — APPOINTMENT (OUTPATIENT)
Dept: PHYSICAL THERAPY | Facility: CLINIC | Age: 87
DRG: 469 | End: 2022-07-31
Attending: ORTHOPAEDIC SURGERY
Payer: COMMERCIAL

## 2022-07-31 ENCOUNTER — TRANSFERRED RECORDS (OUTPATIENT)
Dept: MEDSURG UNIT | Facility: CLINIC | Age: 87
End: 2022-07-31

## 2022-07-31 VITALS
HEART RATE: 94 BPM | SYSTOLIC BLOOD PRESSURE: 150 MMHG | RESPIRATION RATE: 21 BRPM | OXYGEN SATURATION: 93 % | HEIGHT: 70 IN | WEIGHT: 167.11 LBS | BODY MASS INDEX: 23.92 KG/M2 | TEMPERATURE: 99.6 F | DIASTOLIC BLOOD PRESSURE: 71 MMHG

## 2022-07-31 PROCEDURE — 99239 HOSP IP/OBS DSCHRG MGMT >30: CPT | Performed by: HOSPITALIST

## 2022-07-31 PROCEDURE — 250N000013 HC RX MED GY IP 250 OP 250 PS 637: Performed by: PHYSICIAN ASSISTANT

## 2022-07-31 PROCEDURE — 250N000013 HC RX MED GY IP 250 OP 250 PS 637: Performed by: INTERNAL MEDICINE

## 2022-07-31 PROCEDURE — 97110 THERAPEUTIC EXERCISES: CPT | Mod: GP

## 2022-07-31 PROCEDURE — 97530 THERAPEUTIC ACTIVITIES: CPT | Mod: GP

## 2022-07-31 PROCEDURE — 250N000013 HC RX MED GY IP 250 OP 250 PS 637: Performed by: ORTHOPAEDIC SURGERY

## 2022-07-31 RX ADMIN — POLYETHYLENE GLYCOL 3350 17 G: 17 POWDER, FOR SOLUTION ORAL at 08:13

## 2022-07-31 RX ADMIN — APIXABAN 5 MG: 5 TABLET, FILM COATED ORAL at 08:13

## 2022-07-31 RX ADMIN — SENNOSIDES AND DOCUSATE SODIUM 1 TABLET: 50; 8.6 TABLET ORAL at 08:14

## 2022-07-31 RX ADMIN — FLUTICASONE FUROATE AND VILANTEROL TRIFENATATE 1 PUFF: 200; 25 POWDER RESPIRATORY (INHALATION) at 08:25

## 2022-07-31 RX ADMIN — ATORVASTATIN CALCIUM 10 MG: 10 TABLET, FILM COATED ORAL at 08:14

## 2022-07-31 RX ADMIN — DULOXETINE 60 MG: 60 CAPSULE, DELAYED RELEASE ORAL at 08:13

## 2022-07-31 RX ADMIN — FAMOTIDINE 40 MG: 20 TABLET ORAL at 08:13

## 2022-07-31 RX ADMIN — METOPROLOL SUCCINATE 12.5 MG: 25 TABLET, EXTENDED RELEASE ORAL at 08:13

## 2022-07-31 RX ADMIN — FERROUS SULFATE TAB 325 MG (65 MG ELEMENTAL FE) 325 MG: 325 (65 FE) TAB at 08:14

## 2022-07-31 RX ADMIN — TORSEMIDE 10 MG: 10 TABLET ORAL at 08:14

## 2022-07-31 ASSESSMENT — ACTIVITIES OF DAILY LIVING (ADL)
ADLS_ACUITY_SCORE: 45
ADLS_ACUITY_SCORE: 41
ADLS_ACUITY_SCORE: 41
ADLS_ACUITY_SCORE: 45
ADLS_ACUITY_SCORE: 41
ADLS_ACUITY_SCORE: 45

## 2022-07-31 NOTE — PROGRESS NOTES
Care Management Discharge Note    Discharge Date: 07/31/2022       Discharge Disposition: Transitional Care    Discharge Services: None    Discharge DME: None    Discharge Transportation: family or friend will provide    Private pay costs discussed: transportation costs    PAS Confirmation Code: [id number]  Patient/family educated on Medicare website which has current facility and service quality ratings: yes    Education Provided on the Discharge Plan:    Persons Notified of Discharge Plans: yes  Patient/Family in Agreement with the Plan: yes    Handoff Referral Completed: No    Additional Information:  Patient is able to discharge today. Writer confirmed bed with [facility] for today. Call placed to patient's wife Roberta to discuss transport. Roberta noted that she had previously transported patient and would like to know if she could transport today. Writer spoke to charge nurse and physical therapist Alisha who saw patient today and they confirmed that with assist patient could transport with wife. Wife is available at 1600 today at door 6. Call to [facility] to confirm and they agreed. Discharge orders to be faxed directly to TCU at [phone].     Orders received for discharge and faxed to number provided above.         SY Ramsey

## 2022-07-31 NOTE — PLAN OF CARE
Goal Outcome Evaluation:    Pt. A&O to self only. VSS on RA. Denies pain. Voiding via urinal. Had a smear BM this shift. Frequent turn & weight shift in bed. Minimal distress this shift. CMS intact. Dressing CDI. Plan discharge to TCU possibly Monday. Will cont' to monitor.

## 2022-07-31 NOTE — DISCHARGE SUMMARY
[facility]  Hospitalist Discharge Summary      Date of Admission:  7/25/2022  Date of Discharge:  7/31/2022  Discharging Provider: Bennie Rodney MD  Discharge Service: Hospitalist Service    Discharge Diagnoses   1. Status post right total knee arthroplasty 7/25/2022  2. Acute right heart failure   3. Acute hypoxic respiratory failure, suspect related to above  4. Type 2 non-ST-elevation myocardial infarction   5. Acute blood loss anemia   6. Acute kidney injury   7. Acute metabolic encephalopathy    8. Possible dementia       History of     Chronic atrial fibrillation on anticoagulation    Chronic obstructive lung disease     Stage 3a chronic kidney disease     Aortic stenosis s/p TAVR with bioprosthetic valve 2015    Chronic peripheral neuropathy    Dyslipidemia    Gastroesophageal reflux disease     Bullous pemphigoid and chronic pruritus    MGUS    Stage 4 Non-Hodgkins lymphoma in remission    Abdominal aortic aneurysm     Deep venous thrombosis 10 years ago     Recurrent pulmonary embolism, most recently 5/2021    Lung cancer status post wedge resection 2019    Follow-ups Needed After Discharge   Follow-up Appointments     Follow Up Care      Follow-up with Dr Deshpande in 2-3 weeks for wound check.         Follow Up and recommended labs and tests      Follow up with me 3 weeks   if still in tcu  can  remove  staples  and    steri strip the  wound at  3  weeks post op         Follow Up and recommended labs and tests      Follow-up with Nursing home physician.  The following labs/tests are   recommended: CBC, BMP.  Follow-up with primary care provider, Karson Bishop, after TCU   discharge.  Follow-up with [facility] 1-2 weeks.             Unresulted Labs Ordered in the Past 30 Days of this Admission     No orders found from 6/25/2022 to 7/26/2022.          Discharge Disposition   Discharged to short-term care facility  Condition at discharge: Stable}    Hospital Course  "  HPI:  \"Hermilo Velasquez is a very pleasant 89 year old male with a past medical history of atrial fibrillation, DVT, COPD, non-Hodgkin's lymphoma, peripheral neuropathy, GERD, and aortic stenosis status post valve replacement, amongst other conditions who underwent a scheduled right total knee arthroplasty on 7/25/2022.  The hospitalist service was consulted for postoperative medical comanagement.  Right total knee arthroplasty was performed under spinal anesthesia.  No immediate complications reported.  EBL not documented. Spinal anesthesia.  Postoperative vital signs reviewed. Patient denies any fever, chills, headache, lightheadedness, flulike symptoms, chest pain, shortness of breath, abdominal pain, nausea, vomiting, diarrhea, dysuria, focal weakness. Pain controlled postoperatively.\"      Hermilo Velasquez is a very pleasant 89 year old male with a past medical history including COPD; AS s/p AVR; AF; AAA; h/o lung cancer s/p resection; h/o NHL s/p chemotherapy; h/o DVT/PE; who underwent elective R /2022. IM Hospitalist service consulted for medical comanagement.     Status post right total knee arthroplasty 7/25/2022.  * Tramadol stopped 7/26 given oversedation.  - Post-op management per Ortho.  - Continue acetaminophen 975 mg q8h; PRN acetaminophen; minimize opioids as able.  - Continue apixaban 5 mg BID per Ortho (increased 7/29).  - Continue PT, OT.     New right heart failure, unclear etiology, question related to stress from anesthesia, not felt to be ACS.  Acute hypoxic respiratory failure, suspect related to above.  Recent hypotension, question related right heart failure or medication related or combination.  Troponin elevation, suspect demand ischemia (type 2 NSTEMI).  * 7/26: Afternoon pt noted to be drowsy/lethargic with BP's 70's (had received tramadol earlier in the morning). Tramadol stopped. Given IVF's with improvement in BP's to 90's systolic.  * 7/27: BP's stable in 90's. " Subsequently was hypoxic up to 5L while pt up in chair; no chest pain. Sats improved after being placed back into bed. ECG showed sinus with RBBB (RBBB not new). CXR 7/27 showed low lung volumes; mild atelectasis/infiltrate LLL. Echo 7/27 showed LVEF 55-60%; RV severely dilated with severely decreased RV systolic function; flattened septum is consistent with RV pressure/volume overload; bioprosthetic AVR (noted that gradient improved from 4/2021). Metoprolol XL held.  * 7/28: BP's in 110's systolic, still tachycardic, still requiring O2. Creatinine improved. Right lower extremity US 7/28 negative for DVT. CT chest 7/28 negative for PE; minimal effusions with probable compressive atelectasis, R > L. Given RHF with negative PE, Cardiology consulted; felt could be related to anesthesia from surgery with decreased baseline reserve given other co-morbidities including COPD, afib. Trops checked and were mildly elevated, but flat, NTP-BNP 50475.   * 7/29: BP's improved. More tachy (see below) and metoprolol XL restarted. Started on torsemide by Cardiology (allergy/rash to furosemide).  - Starting torsemide 10 mg daily; monitor for rash; if develops rash, try ethacrynic acid.  - Continue metoprolol XL.  - Monitor i/o's, daily wts.  - Consider stress test outpatient.  - Management of afib/flutter as below.     Atrial flutter with RVR.   Chronic atrial fibrillation on chronic anticoagulation.  * PTA on apixaban. On 7/25, noted that pt had been on low dose metoprolol XL, taken off med list, however did not seen in any notes that this was to be discontinued. It was d/w pt's wife and she [facility], unclear of the dose.   * Started on metoprolol XL 12.5 mg daily 7/26.  * ECG 7/27 showed sinus with RBBB. Metoprolol XL held 7/27 due to tachycardia.  * Developed tachycardia am 7/29; ECG showed atrial flutter with RVR, RBBB. HR's improved after restarting metoprolol XL.  - Continue to monitor on  telemetry.  - Increase metoprolol XL 12.5 mg daily --> BID.  - Continue apixaban 5 mg BID.     Blood loss anemia on chronic iron deficiency anemia.  * Hx PATIENCE and occult GI bleeding. Hgb as low as 6.8 on 4/11/2022 requiring transfusion. EGD 4/12/2022 showed erosive gastropathy but no stigmata of bleed. Colonoscopy 4/12/2022 showed diverticulosis and multiple AVM but no source of bleed, they were treated. Since then he has been primarily 9-10 range. Received Iron infusions. Most recent Hgb 12.1 6/27/2022.  * Hgb 10.4 on 7/26.     Report of skin tears s/p recent fall.  Frequent falls.  * Of note, was admitted in 5/2022 with right knee pain s/p fall, with right knee effusion and hemarthrosis in setting of DOAC.   - Continue local wound cares per [facility] to evaluate skin tears     COPD  - Continue fluticasone-vilanterol; PRN albuterol.     FRED, suspect prerenal related to hypotension from RHF, on chronic kidney disease, stage 3a.  * Baseline Cr 1.1-1.3.   * Cr on admission, 1.33.  * Issues with low BP's 7/26, given IVF's.  * Cr increased to 1.67 7/27.  * Cr improved 7/28.   * Cr normalized 7/29.     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  AMS, suspect metabolic and toxic encephalopathy.  * On 7/25, noted that no hx dementia/cognitive impairment, but clearly has cognitive deficits versus is just very hard of hearing during prior admissions.   * On 7/26, pt lethargic as above, associated with low BP's; tramadol stopped.  * On 7/27 mentation improved.  * On 7/28, confused, agitated overnight.   - Re-orient as needed.  - Maintain normal day/night, sleep/wake cycles.  - Minimize sedating medications as able.  - Patient could possibly benefit from getting neurocognitive evaluation in an outpatient setting if not already done.     Aortic stenosis s/p TAVR with bioprosthetic valve 2015.  * Coronary angiogram at that time was negative for obstructive CAD. TTE 4/2021 showed preserved LVEF with mild to moderate prosthetic valve  stenosis.      Chronic peripheral neuropathy.  - Continue PTA gabapentin 600 mg at bedtime.     Dyslipidemia.  * Chronic and stable.  - Continue atorvastatin.     GERD.  * Chronic and stable.  - Continue famotidine.     Bullous pemphigoid and chronic pruritus.  - Continue hydroxyzine.     MGUS.  History of stage 4 Non-Hodgkins Lymphoma in remission  * Followed by Dr. Zurita of [facility]. His lymphoma was diagnosed and treated with systemic chemotherapy in 2012 and reportedly has been in remission since. His MGUS is being monitored as an outpatient.  - Follow-up outpatient.     AAA.  * CT in 4/2022 showed a 3.5 cm saccular infrarenal abdominal aortic aneurysm.  - Monitor outpatient.     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  - Continue apixaban.     History of lung cancer status post wedge resection 2019.  - Noted.     Rash on leg  Doubt a drug eruption    Monitor     Consultations This Hospital Stay   PHYSICAL THERAPY ADULT IP CONSULT  OCCUPATIONAL THERAPY ADULT IP CONSULT  HOSPITALIST IP CONSULT  WOUND OSTOMY CONTINENCE NURSE  IP CONSULT  SOCIAL WORK IP CONSULT  CARE MANAGEMENT / SOCIAL WORK IP CONSULT  VASCULAR ACCESS ADULT IP CONSULT  PHYSICAL THERAPY ADULT IP CONSULT  OCCUPATIONAL THERAPY ADULT IP CONSULT  IV TEAM IP CONSULT  VASCULAR ACCESS ADULT IP CONSULT  VASCULAR ACCESS ADULT IP CONSULT  PHARMACY IP CONSULT  CARDIOLOGY IP CONSULT  CARDIOLOGY IP CONSULT  PHYSICAL THERAPY ADULT IP CONSULT    Code Status   Full Code    Time Spent on this Encounter   I, Bennie Rodney MD, personally saw the patient today and spent greater than 30 minutes discharging this patient.       Bennie Rodney MD  [facility]  [address] [place] [id number]  Phone: [phone]  Fax: [phone]  ______________________________________________________________________    Physical Exam   Vital Signs: Temp: 99.6  F (37.6  C) Temp src:  "Axillary BP: (!) 150/71 Pulse: 94   Resp: 21 SpO2: 93 % O2 Device: None (Room air)    Weight: 167 lbs 1.74 oz  Constitutional: awake, alert, cooperative, no apparent distress  Respiratory: No increased work of breathing, good air exchange, clear to auscultation bilaterally, no crackles or wheezing  Cardiovascular: regular rate and rhythm, normal S1 and S2, no S3 or S4, and no murmur noted  GI: normal bowel sounds, soft, non-distended, non-tender, no masses palpated  Neuropsychiatric: General: normal, calm and normal eye contact    Primary Care Physician   Karson Bishop    Discharge Orders      Medication Therapy Management Referral      Follow-Up with Cardiology      Follow-Up with Cardiology OZZY      Reason for your hospital stay    Right Total Knee     Contact Surgeon Team    You may experience symptoms that require follow-up before your scheduled appointment. Refer to the \"Stoplight Tool\" for instructions on when to contact your Surgeon Team if you are concerned about pain control, blood clots, constipation, or if you are unable to urinate.     Orthopedic Urgent Care    If you are not able to reach your Surgeon Team and you need immediate care, go to the Orthopedic Urgent Care at your Surgeon's office.  Do NOT go to the Emergency Room unless you have shortness of breath, chest pain, or other signs of a medical emergency.     Call 911    Call 911 immediately if you experience sudden-onset chest pain, arm weakness/numbness, slurred speech, or shortness of breath     Breathing exercises    Perform breathing exercises using your Incentive Spirometer 10 times per hour while awake for 2 weeks.     Fever Management    A low grade fever can be expected after surgery.  Use acetaminophen (TYLENOL) as needed for fever management.  Contact your Surgeon Team if you have a fever greater than 101.5 F, chills, and/or night sweats.     Constipation management    Constipation (hard, dry bowel movements) is expected after surgery " due to the combination of being less active, the anesthetic, and the opioid pain medication.  You can do the following to help reduce constipation:  ~  FLUIDS:  Drink clear liquids (water or Gatorade), or juice (apple/prune).  ~  DIET:  Eat a fiber rich diet.    ~  ACTIVITY:  Get up and move around several times a day.  Increase your activity as you are able.  MEDICATIONS:  Reduce the risk of constipation by starting medications before you are constipated.  You can take Miralax   (1 packet as directed) and/or a stool softener (Senokot 1-2 tablets 1-2 times a day).  If you already have constipation and these medications are not working, you can get magnesium citrate and use as directed.  If you continue to have constipation you can try an over the counter suppository or enema.  Call your Surgeon Team if it has been greater than 3 days since your last bowel movement.     Reduced Urine Output    Changes in the amount of fluids you drank before and after surgery may result in problems urinating.  It is important to stay well-hydrated after surgery and drink plenty of water. If it has been greater than 8 hours since you have urinated despite drinking plenty of water, call your Surgeon Team.     Activity - Exercises to prevent blood clots    Unless otherwise directed by your Surgeon team, perform the following exercises at least three times per day for the first four weeks after surgery to prevent blood clots in your legs: 1) Point and flex your feet (Ankle Pumps), 2) Move your ankle around in big circles, 3) Wiggle your toes, 4) Walk, even for short distances, several times a day, will help decrease the risk of blood clots.     Pain after Surgery    Pain after surgery is normal and expected.  You will   have some amount of pain for several weeks after surgery.  Your pain will improve with time.  There are several things you can do to help reduce your pain including: rest, ice, elevation, and using pain medications as  "needed. Contact your Surgeon Team if you have pain that persists or worsens after surgery despite rest, ice, elevation, and taking your medication(s) as prescribed. Contact your Surgeon Team if you have new numbness, tingling, or weakness in your operative extremity.     Swelling after Surgery    Swelling and/or bruising of the surgical extremity is common and may persist for several months after surgery. In addition to frequent icing and elevation, gentle compressive support with an ACE wrap or tubigrip may help with swelling. Apply compression regularly, removing at least twice daily to perform skin checks. Contact your Surgeon Team if your swelling increases and is NOT associated with an increase in your activity level, or if your swelling increases and is associated with redness and pain.     LOWER Extremity Elevation    Swelling is expected for several months after surgery. This type of swelling is usually associated with gravity and activity, and can be improved with elevation.   The best way to do this is to get your \"toes above your nose\" by laying down and placing several pillows lengthwise under your calf and heel. When elevating your leg keep your knee completely straight. Perform this elevation as often as possible especially for the first two weeks after surgery.     Cold therapy    Ice can be used to control swelling and discomfort after surgery. Place a thin towel over your operative site and apply the ice pack overtop. Leave ice pack in place for 20 minutes, then remove for 20 minutes. Repeat this 20 minutes on/20 minutes off routine as often as tolerated.     acetaminophen (TYLENOL) Instructions    You were discharged with acetaminophen (TYLENOL) for pain management after surgery. Acetaminophen most effectively manages pain symptoms when it is taken on a schedule without missing doses (every four, six, or eight hours). Your Provider will prescribe a safe daily dose between 3000 - 4000 mg.  Do NOT " exceed this daily dose. Most patients use acetaminophen for pain control for the first four weeks after surgery.  You can wean from this medication as your pain decreases.     NSAID Instructions    You will start an anti-inflammatory medication after your post op appointment with Dr Deshpande.This will be used for pain management to use in combination with acetaminophen (TYLENOL) and the narcotic pain medication.  Take this medication exactly as directed.  You should only take one anti-inflammatory at a time.  Some common anti-inflammatories include: ibuprofen (ADVIL, MOTRIN), naproxen (ALEVE, NAPROSYN), celecoxib (CELEBREX), meloxicam (MOBIC), ketorolac (TORADOL).  Take this medication with food and water.     Opioids - Tapering Instructions    In the first three days following surgery, your symptoms may warrant use of the narcotic pain medication every four to six hours as prescribed. This is normal. As your pain symptoms improve, focus your efforts on decreasing (tapering) use of narcotic medications. The most successful tapering strategy is to first, decrease the number of tablets you take every 4-6 hours to the minimum prescribed. Then, increase the amount of time between doses.  For example:  First, taper to   or 1 tablet every 4-6 hours.  Then, taper to   or 1 tablet every 6-8 hours.  Then, taper to   or 1 tablet every 8-10 hours.  Then, taper to   or 1 tablet every 10-12 hours.  Then, taper to   or 1 tablet at bedtime.  The bedtime dose can help with comfort during sleep and is typically the last dose to be discontinued after surgery.     Muscle relaxant Medication Instructions    You were discharged with a muscle relaxer medication that can be used in conjunction with acetaminophen (TYLENOL) and the narcotic medication to manage pain symptoms. Take the muscle relaxant medication exactly as directed.     Follow Up Care    Follow-up with Dr Deshpande in 2-3 weeks for wound check.     Activity - Total Knee  Arthroplasty     Return to Driving    Return to driving - Driving is NOT permitted until directed by your provider. Under no circumstance are you permitted to drive while using narcotic pain medications.     Dressing / Wound Care - Wound    You have a clean dressing on your surgical wound. Dressing change instructions as follows: as  directed. Contact your Surgeon Team if you have increased redness, warmth around the surgical wound, and/or drainage from the surgical wound.     Dressing / Wound Care - NO Tub Bathing    Tub bathing, swimming, or any other activities that will cause your incision to be submerged in water should be avoided until allowed by your Surgeon.     Opioid Instructions (Less than 65 years)    You were discharged with an opioid medication (hydromorphone, oxycodone, hydrocodone, or tramadol). This medication should only be taken for breakthrough pain that is not controlled with acetaminophen (TYLENOL). If you rate your pain less than 3 you do not need this medication.  Pain rating 0-3:  You do not need this medication.  Pain rating 4-6:  Take 1 tablet every 4-6 hours as needed  Pain rating 7-10:  Take 2 tablets every 4-6 hours as needed.  Do not exceed 6 tablets per day     NO Precautions    No precautions directed by your Provider.  You may perform range of motion activities as tolerated.     Weight bearing as tolerated    Weight bearing as tolerated on your operative extremity.     Shower with wound/dressing covered    You must COVER your dressing or incision with saran wrap (or any other non-permeable covering) to allow the incision to remain dry while showering.  You may shower 5 days after surgery as long as the surgical wound stays dry. Continue to cover your dressing or incision for showering until your first office visit.  You are strictly prohibited from soaking   or submerging the surgical wound underwater.     General info for SNF    Length of Stay Estimate: Short Term Care: Estimated #  of Days <30  Condition at Discharge: Stable  Level of care:skilled   Rehabilitation Potential: Excellent  Admission H&P remains valid and up-to-date: Yes  Recent Chemotherapy: N/A  Use Nursing Home Standing Orders: Yes     Follow Up and recommended labs and tests    Follow up with me 3 weeks   if still in tcu  can  remove  staples  and  steri strip the  wound at  3  weeks post op     Wound care    Site:   knee  Instructions:  dresing  change  every  few  dys  or as  needed     Activity - Up with assistive device     Activity - Up with nursing assistance     Activity - Ambulate in hallway    Every shift     Continuous Passive Motion Machine    Start CPM Day of Surgery.  0 - 90 degrees. Advance as tolerated.     Follow Up and recommended labs and tests    Follow-up with Nursing home physician.  The following labs/tests are recommended: CBC, BMP.  Follow-up with primary care provider, Karson Bishop, after TCU discharge.  Follow-up with [facility] 1-2 weeks.     Physical Therapy Adult Consult    Evaluate and treat as clinically indicated.    Reason:   post total knee     Occupational Therapy Adult Consult    Evaluate and treat as clinically indicated.    Reason:  post total knee     Echocardiogram Complete    Administration of IV contrast will be tailored to this examination per the appropriate written protocol listed in the Echocardiography department Protocol Book, or by the supervising Cardiologist. This may result in an order change.    Use of contrast is at the discretion of the supervising Cardiologist.     Crutches DME    DME Documentation: Describe the reason for need to support medical necessity: Impaired gait status post knee surgery. I, the undersigned, certify that the above prescribed supplies are medically necessary for this patient and is both reasonable and necessary in reference to accepted standards of medical practice in the treatment of this patient's condition and is not prescribed as a  convenience.     Cane DME    DME Documentation: Describe the reason for need to support medical necessity: Impaired gait status post knee surgery. I, the undersigned, certify that the above prescribed supplies are medically necessary for this patient and is both reasonable and necessary in reference to accepted standards of medical practice in the treatment of this patient's condition and is not prescribed as a convenience.     Walker DME    DME Documentation: Describe the reason for need to support medical necessity: Impaired gait status post knee surgery. I, the undersigned, certify that the above prescribed supplies are medically necessary for this patient and is both reasonable and necessary in reference to accepted standards of medical practice in the treatment of this patient's condition and is not prescribed as a convenience.     Regular Diet Adult     Diet    Follow this diet upon discharge: Orders Placed This Encounter      Regular Diet Adult      Low Saturated Fat Na <2400 mg       Significant Results and Procedures   Most Recent 3 CBC's:Recent Labs   Lab Test 07/28/22  1643 07/28/22  0720 07/27/22  0719   WBC 12.9* 15.1* 13.1*   HGB 9.5* 9.9* 9.5*   MCV 89 88 92    151 140*     Most Recent 3 BMP's:Recent Labs   Lab Test 07/30/22  0604 07/29/22  0608 07/28/22  0720    140 139   POTASSIUM 3.5 4.0 3.8   CHLORIDE 107 110* 111*   CO2 28 23 23   BUN 23 24 41*   CR 0.87 0.95 1.31*   ANIONGAP 3 7 5   AMRITA 8.8 8.6 8.5   * 109* 114*     Most Recent 2 LFT's:Recent Labs   Lab Test 04/11/22  0744 04/10/22  2034   AST 17 11   ALT 10 10   ALKPHOS 78 91   BILITOTAL 0.3 0.5     Most Recent 3 INR's:Recent Labs   Lab Test 05/08/22  1223 10/23/20  1617 08/17/20  0948   INR 1.99* 1.39* 1.21*     Most Recent 3 Hemoglobins:Recent Labs   Lab Test 07/28/22  1643 07/28/22  0720 07/27/22  0719   HGB 9.5* 9.9* 9.5*     Most Recent 3 Troponin's:Recent Labs   Lab Test 04/14/21  0506 04/14/21  0039 04/13/21 2038    TROPI 0.513* 0.529* 0.331*     Most Recent 6 Bacteria Isolates From Any Culture (See EPIC Reports for Culture Details):Recent Labs   Lab Test 04/14/21  0815 04/13/21 2054 04/13/21 2038 09/13/20  1329 09/13/20  1256 08/15/20  1451   CULT Canceled, Test credited  >10 Squamous epithelial cells/low power field indicates oral contamination. Please   recollect.  *  Notification of test cancellation was given to  Elva Ospina RN [id number] 4/14/20 by AM   No growth No growth No growth No growth No growth  No growth     Most Recent TSH and T4:Recent Labs   Lab Test 09/08/21  1459 07/31/18  1104 02/21/18  1124   TSH 2.46   < > 2.38   T4  --   --  1.21    < > = values in this interval not displayed.     Most Recent Hemoglobin A1c:Recent Labs   Lab Test 07/31/18  1104   A1C 5.8*     Most Recent 6 glucoses:Recent Labs   Lab Test 07/30/22  0604 07/29/22  0608 07/28/22  0720 07/27/22  0719 07/26/22  0703 07/26/22  0158   * 109* 114* 105* 105*  105* 108*     Most Recent Urinalysis:Recent Labs   Lab Test 05/08/22  1501   COLOR Yellow   APPEARANCE Cloudy*   URINEGLC 30 *   URINEBILI Negative   URINEKETONE Negative   SG 1.027   UBLD Negative   URINEPH 5.5   PROTEIN 50 *   NITRITE Negative   LEUKEST Negative   RBCU 10*   WBCU 4   ,   Results for orders placed or performed during the hospital encounter of 07/25/22   XR Knee Port Right 1/2 Views    Narrative    KNEE PORTABLE RIGHT ONE TO TWO VIEWS    7/25/2022 12:49 PM     HISTORY:  Postoperative Total Knee    COMPARISON: Radiographs from 5/8/2022.      Impression    IMPRESSION: There is a new right total knee arthroplasty with adjacent  gas. Excellent position and alignment of components. No evidence of  complication or periprosthetic fracture.    NAVEED DOMINGUEZ MD         SYSTEM ID:  [id number]   XR Chest Port 1 View    Narrative    CHEST PORTABLE ONE VIEW  7/27/2022 11:24 AM       INDICATION: Dyspnea, hypoxia, evaluate for focal infiltrates/edema.  COMPARISON: 4/10/2022        Impression    IMPRESSION: Low lung volumes. Mild atelectasis or infiltrate left  lower lobe. Stable linear scarring right upper lung. The lungs are  otherwise clear.    MARGARITA ADAMS MD         SYSTEM ID:  [id number]   CT Chest Pulmonary Embolism w Contrast    Narrative    CT CHEST PULMONARY EMBOLISM WITH CONTRAST 7/28/2022 2:39 PM    CLINICAL HISTORY: Chest pain. Hypoxia, right heart strain, evaluate  for PE.    TECHNIQUE: CT angiogram chest during arterial phase injection IV  contrast. 2D and 3D MIP reconstructions were performed by the CT  technologist. Dose reduction techniques were used.     CONTRAST: 64mL [id number]-[id number]    COMPARISON: April 10, 2022    FINDINGS:  ANGIOGRAM CHEST: Pulmonary arteries are normal caliber and negative  for pulmonary emboli. Thoracic aorta is negative for dissection. No CT  evidence of right heart strain.    LUNGS AND PLEURA: Minimal pleural effusion bilaterally right worse  than left. Minimal associated compressive atelectasis vs. less likely  infiltrate. Resection changes on the right noted. Moderate bronchial  wall thickening. Areas of emphysema evident.    MEDIASTINUM/AXILLAE: No adenopathy or aneurysm.    CORONARY ARTERY CALCIFICATIONS: Mild.    UPPER ABDOMEN: No acute findings.    MUSCULOSKELETAL: No frankly destructive bony lesions.      Impression    IMPRESSION:  1.  No pulmonary embolism demonstrated.  2.  Minimal bilateral effusions and associated probable compressive  atelectasis right worse than left.    FUENTES SIMON MD         SYSTEM ID:  [id number]   US Lower Extremity Venous Duplex Right    Narrative    US LOWER EXTREMITY VENOUS DUPLEX RIGHT 7/28/2022 9:26 AM    CLINICAL HISTORY/INDICATION: Right lower extremity pain/swelling,  evaluate for signs of DVT.    COMPARISON: 8/17/2020    TECHNIQUE:   Grayscale, color-flow, and spectral waveform analysis were performed  of the deep veins of the right lower extremity    FINDINGS:   The right common femoral vein, femoral vein  and popliteal vein  demonstrate normal compressibility, spectral waveform, color flow and  augmentation.    The right posterior tibial vein, peroneal vein, and greater saphenous  vein are compressible.    The contralateral left common femoral vein demonstrates normal  compressibility, spectral waveform, color flow and augmentation.      Impression    IMPRESSION:   No evidence of deep venous thrombosis in the right lower extremity    ESAU SMITH DO         SYSTEM ID:  [id number]   Echocardiogram Complete     Value    LVEF  55-60%    [facility]    [id number]  [id number]  [id number]  [id number]^SANIA^AZIZA^RADHA     [facility]  Echocardiography Laboratory  [address] [place] [address]     Name: CLYDE RODRIGUEZ  MRN: [id number]  : 1932  Study Date: 2022 11:50 AM  Age: 89 yrs  Gender: Male  Patient Location: [facility]  Reason For Study: Dyspnea  Ordering Physician: AZIZA LUI  Referring Physician: Karson Bishop MD  Performed By: Amaya Mcdonald RDCS     BSA: 1.9 m2  Height: 70 in  Weight: 167 lb  HR: 62  BP: 92/48 mmHg  ______________________________________________________________________________  Procedure  Complete Echo Adult.  ______________________________________________________________________________  Interpretation Summary     Left ventricular systolic function is normal.  The visual ejection fraction is 55-60%.  The right ventricle is severely dilated. Severely decreased right ventricular  systolic function.  Flattened septum is consistent with RV pressure/volume overload.  Bioprosthetic AVR. Vmax 1.9 m/sec, mean gradient 8 mmHg, АННА 1.6 cm2, DVI 0.5.  The right atrium is severely dilated.  The left atrium is moderately dilated.  The inferior vena cava was normal in size with preserved respiratory  variability.  There is no pericardial effusion.     Compared to study dated 2021, the gradient across AVR has improved and is  within normal for this  valve type. The right ventricle is severely abnormal on  today's study compared to prior. Consider pulmonary embolism on the  differential.  ______________________________________________________________________________  Left Ventricle  The left ventricle is normal in size. There is normal left ventricular wall  thickness. Left ventricular systolic function is normal. The visual ejection  fraction is 55-60%. Grade I or early diastolic dysfunction. Flattened septum  is consistent with RV pressure/volume overload.     Right Ventricle  The right ventricle is severely dilated. Severely decreased right ventricular  systolic function. RV apical akinesis.     Atria  The left atrium is moderately dilated. The right atrium is severely dilated.     Mitral Valve  There is moderate mitral annular calcification. The mean mitral valve gradient  is 1.2 mmHg.     Tricuspid Valve  The tricuspid valve is normal in structure and function. There is mild (1+)  tricuspid regurgitation. The right ventricular systolic pressure is  approximated at 25.3 mmHg plus the right atrial pressure.     Aortic Valve  The prosthetic aortic valve is well-seated. Bioprosthetic AVR. Vmax 1.9 m/sec,  mean gradient 8 mmHg, АННА 1.6 cm2, DVI 0.5.     Pulmonic Valve  The pulmonic valve is not well visualized.     Vessels  The aortic root is normal size. The inferior vena cava was normal in size with  preserved respiratory variability.     Pericardium  There is no pericardial effusion.     ______________________________________________________________________________  MMode/2D Measurements & Calculations  IVSd: 0.80 cm  LVIDd: 5.0 cm  LVIDs: 3.7 cm  LVPWd: 0.97 cm  FS: 27.1 %  LV mass(C)d: 157.8 grams  LV mass(C)dI: 81.6 grams/m2     LA dimension: 3.5 cm  LVOT diam: 2.0 cm  LVOT area: 3.2 cm2  LA Volume (BP): 75.7 ml  LA Volume Index (BP): 39.2 ml/m2  RWT: 0.39     Doppler Measurements & Calculations  MV E max earlene: 40.3 cm/sec  MV A max earlene: 75.2 cm/sec  MV  E/A: 0.54  MV max PG: 3.2 mmHg  MV mean P.2 mmHg  MV V2 VTI: 26.0 cm  MVA(VTI): 2.8 cm2  MV dec slope: 122.0 cm/sec2  MV dec time: 0.34 sec     Ao V2 max: 192.7 cm/sec  Ao max PG: 15.0 mmHg  Ao V2 mean: 138.4 cm/sec  Ao mean P.4 mmHg  Ao V2 VTI: 40.9 cm  АННА(I,D): 1.8 cm2  АННА(V,D): 1.7 cm2  LV V1 max P.1 mmHg  LV V1 max: 101.4 cm/sec  LV V1 VTI: 23.5 cm  SV(LVOT): 73.9 ml  SI(LVOT): 38.2 ml/m2  TR max santi: 251.2 cm/sec  TR max P.3 mmHg  AV Santi Ratio (DI): 0.53  АННА Index (cm2/m2): 0.93  E/E' av.4  Lateral E/e': 3.3  Medial E/e': 7.6     ______________________________________________________________________________  Report approved by: Calli Gutiérrez 2022 03:17 PM           *Note: Due to a large number of results and/or encounters for the requested time period, some results have not been displayed. A complete set of results can be found in Results Review.       Discharge Medications   Current Discharge Medication List      START taking these medications    Details   cephALEXin (KEFLEX) 250 MG capsule Take 1 capsule (250 mg) by mouth 4 times daily  Qty: 21 capsule, Refills: 0    Associated Diagnoses: Total knee replacement status, right      metoprolol succinate ER (TOPROL XL) 25 MG 24 hr tablet Take 0.5 tablets (12.5 mg) by mouth 2 times daily ; Hold for SBP<100 or HR<55.  Qty: 30 tablet, Refills: 3    Associated Diagnoses: Atrial flutter, unspecified type (H)      ondansetron (ZOFRAN ODT) 4 MG ODT tab Take 1 tablet (4 mg) by mouth every 8 hours as needed for nausea Dissolve ON the tongue.  Qty: 10 tablet, Refills: 0    Associated Diagnoses: H/O total knee replacement, right      polyethylene glycol (MIRALAX) 17 g packet Take 17 g by mouth daily  Qty: 7 packet, Refills: 0    Associated Diagnoses: H/O total knee replacement, right      senna-docusate (SENOKOT-S/PERICOLACE) 8.6-50 MG tablet Take 1 tablet by mouth 2 times daily as needed for constipation Take while on oral narcotics  to prevent or treat constipation.  Qty: 30 tablet, Refills: 0    Associated Diagnoses: H/O total knee replacement, right      torsemide (DEMADEX) 10 MG tablet Take 1 tablet (10 mg) by mouth daily ; Hold for SBP<100.  Qty: 30 tablet, Refills: 3    Associated Diagnoses: Right-sided heart failure, unspecified HF chronicity (H)         CONTINUE these medications which have CHANGED    Details   !! acetaminophen (TYLENOL) 325 MG tablet Take 2 tablets (650 mg) by mouth every 4 hours as needed for other (mild pain)  Qty: 100 tablet, Refills: 0    Associated Diagnoses: H/O total knee replacement, right      !! acetaminophen (TYLENOL) 500 MG tablet Take 2 tablets (1,000 mg) by mouth HOLD while taking 650 mg tylenol prescribed from surgery    Associated Diagnoses: Acute pain of right knee      apixaban ANTICOAGULANT (ELIQUIS) 5 MG tablet Take 1 tablet (5 mg) by mouth 2 times daily  Qty: 60 tablet, Refills: 3    Associated Diagnoses: Atrial flutter, unspecified type (H)       !! - Potential duplicate medications found. Please discuss with provider.      CONTINUE these medications which have NOT CHANGED    Details   albuterol (PROAIR HFA/PROVENTIL HFA/VENTOLIN HFA) 108 (90 Base) MCG/ACT inhaler Inhale 1-2 puffs into the lungs every 6 hours as needed      albuterol (PROVENTIL) (2.5 MG/3ML) 0.083% neb solution Take 1 vial (2.5 mg) by nebulization every 6 hours as needed for shortness of breath / dyspnea or wheezing  Qty: 180 mL, Refills: 11      atorvastatin (LIPITOR) 10 MG tablet Take 1 tablet (10 mg) by mouth daily  Qty: 90 tablet, Refills: 2      dimenhyDRINATE (DRAMAMINE) 50 MG tablet Take 50 mg by mouth as needed      DULoxetine (CYMBALTA) 60 MG capsule Take 60 mg by mouth daily      famotidine (PEPCID) 40 MG tablet Take 40 mg by mouth 2 times daily      ferrous sulfate (FE TABS) 325 (65 Fe) MG EC tablet Take 325 mg by mouth 2 times daily      fluticasone-vilanterol (BREO ELLIPTA) 200-25 MCG/INH inhaler Inhale 1 puff into the  "lungs daily      gabapentin (NEURONTIN) 300 MG capsule Take 600 mg by mouth At Bedtime (2 x 300 mg = 600 mg)      ACE/ARB/ARNI NOT PRESCRIBED (INTENTIONAL) Please choose reason not prescribed from choices below.    Associated Diagnoses: Chronic diastolic congestive heart failure (H)         STOP taking these medications       hydrOXYzine (VISTARIL) 50 MG capsule Comments:   Reason for Stopping:             Allergies   Allergies   Allergen Reactions     Lidocaine Blisters     Allergy to lidocaine ointment     Omeprazole Itching     Pantoprazole Itching     Prevacid [Lansoprazole] Itching     Lasix [Furosemide] Rash     Penicillins Rash     \"broke out from injection\" 60 yrs ago  Tolerates cephalosporins     "

## 2022-07-31 NOTE — PLAN OF CARE
Goal Outcome Evaluation:    Patient vital signs are at baseline: Yes  Patient able to ambulate as they were prior to admission or with assist devices provided by therapies during their stay:  No,  Reason:  patient confused and general weakness.  Patient MUST void prior to discharge:  Yes  Patient able to tolerate oral intake:  Yes  Pain has adequate pain control using Oral analgesics:  Yes  Does patient have an identified :  Yes  Has goal D/C date and time been discussed with patient:  Yes      Alert and oriented to self only. VSS. CMS intact, Denied pain,sit up in chair most of the time during the shift.  discharged to the TCU.

## 2022-08-01 ENCOUNTER — PATIENT OUTREACH (OUTPATIENT)
Dept: CARE COORDINATION | Facility: CLINIC | Age: 87
End: 2022-08-01

## 2022-08-01 ENCOUNTER — TRANSITIONAL CARE UNIT VISIT (OUTPATIENT)
Dept: GERIATRICS | Facility: CLINIC | Age: 87
End: 2022-08-01
Payer: COMMERCIAL

## 2022-08-01 VITALS
SYSTOLIC BLOOD PRESSURE: 154 MMHG | RESPIRATION RATE: 18 BRPM | HEIGHT: 70 IN | DIASTOLIC BLOOD PRESSURE: 76 MMHG | HEART RATE: 85 BPM | WEIGHT: 157.3 LBS | TEMPERATURE: 97.9 F | OXYGEN SATURATION: 90 % | BODY MASS INDEX: 22.52 KG/M2

## 2022-08-01 DIAGNOSIS — R53.81 PHYSICAL DECONDITIONING: ICD-10-CM

## 2022-08-01 DIAGNOSIS — Z96.651 STATUS POST TOTAL RIGHT KNEE REPLACEMENT: Primary | ICD-10-CM

## 2022-08-01 DIAGNOSIS — I48.0 PAROXYSMAL ATRIAL FIBRILLATION (H): ICD-10-CM

## 2022-08-01 DIAGNOSIS — I71.40 ABDOMINAL AORTIC ANEURYSM (AAA) WITHOUT RUPTURE (H): ICD-10-CM

## 2022-08-01 DIAGNOSIS — J44.0 CHRONIC OBSTRUCTIVE PULMONARY DISEASE WITH ACUTE LOWER RESPIRATORY INFECTION (H): ICD-10-CM

## 2022-08-01 DIAGNOSIS — I10 ESSENTIAL HYPERTENSION WITH GOAL BLOOD PRESSURE LESS THAN 140/90: ICD-10-CM

## 2022-08-01 DIAGNOSIS — D62 ANEMIA DUE TO BLOOD LOSS, ACUTE: ICD-10-CM

## 2022-08-01 DIAGNOSIS — N18.30 STAGE 3 CHRONIC KIDNEY DISEASE, UNSPECIFIED WHETHER STAGE 3A OR 3B CKD (H): ICD-10-CM

## 2022-08-01 DIAGNOSIS — D50.0 IRON DEFICIENCY ANEMIA DUE TO CHRONIC BLOOD LOSS: ICD-10-CM

## 2022-08-01 DIAGNOSIS — R41.89 COGNITIVE IMPAIRMENT: ICD-10-CM

## 2022-08-01 DIAGNOSIS — C85.90 NON-HODGKIN'S LYMPHOMA, UNSPECIFIED BODY REGION, UNSPECIFIED NON-HODGKIN LYMPHOMA TYPE (H): ICD-10-CM

## 2022-08-01 PROCEDURE — 99310 SBSQ NF CARE HIGH MDM 45: CPT | Performed by: NURSE PRACTITIONER

## 2022-08-01 NOTE — PLAN OF CARE
Physical Therapy Discharge Summary    Reason for therapy discharge:    Discharged to transitional care facility.    Progress towards therapy goal(s). See goals on Care Plan in [facility] electronic health record for goal details.  Goals partially met.  Barriers to achieving goals:   discharge from facility.    Therapy recommendation(s):    Continued therapy is recommended.  Rationale/Recommendations:  To further increase independence with mobility.

## 2022-08-01 NOTE — PROGRESS NOTES
[facility] GERIATRICS  PRIMARY CARE PROVIDER AND CLINIC:  Karson Bishop MD, [address] / [facility] [address]  Chief Complaint   Patient presents with     Hospital F/U      [place] [facility] Record Number:  [id number]  [facility]    Hermilo Velasquez  is a 89 year old  (11/3/1932), admitted to the above facility from  [facility]. Hospital stay 7/25/22 through 7/31/22.     HPI:    This is an 89-year-old male, with a past medical history significant for chronic atrial fibrillation, chronic obstructive lung disease, chronic kidney disease stage IIIa, aortic stenosis s/p TAVR with bioprosthetic valve 2015, chronic peripheral neuropathy, dyslipidemia, GERD, bullous pemphigoid chronic MGUS, stage IV non-Hodgkin's lymphoma in remission, abdominal aortic aneurysm, DVT, recurrent pulmonary embolism, and lung cancer s/p wedge resected in 2019, who was admitted to [facility] 7/25/2022 through 7/31/2022 for a right total knee arthroplasty on 7/25/2022.  Postoperatively, became lethargic and hypoxic. Tramadol was discontinued. Chest x-ray with low lung volumes and mild atelectasis. Echocardiogram on 7/27/22 with EF 55-60%, RV severely dilated and severely decreased RV systolic function. Flattened septum consistent with RV pressure/volume overload. Cardiology felt new right-sided heart failure may be related to anesthesia with decreased baseline reserve. Torsemide was initiated. Metoprolol was held due to hypotension, but restarted due to tachycardia. Troponin was elevated and thought to be due to demand ischemia. A TCU stay was recommended for ongoing physical rehabilitation.     Today, patient is sitting in his bed. Initially, does not respond to questions, but when provider steps closer, patient does respond with on word answer. Denies pain or shortness of breath.     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 Vasques      Aortic stenosis     Severe AS, 9/2015 AVR - [facility] TRIFECTA Bovine bioprosthesis 25MM TF-[id number]     Atrial fibrillation (H)     9/2015 Paroxysmal post op Afib - discharged on Warfarin and a beta blocker     COPD (chronic obstructive pulmonary disease) (H)      Deep vein thrombosis (H)      GERD (gastroesophageal reflux disease)      Heart murmur      Monoclonal gammopathy     plasmacyte prominent causing monoclonal gammopathy     Need for SBE (subacute bacterial endocarditis) prophylaxis      Neuropathy      Other and unspecified hyperlipidemia      Other malignant lymphomas     non hodgkin's lymphoma     RBBB (right bundle branch block)      Severe sepsis with acute organ dysfunction (H) 11/16/2015     Unspecified hereditary and idiopathic peripheral neuropathy       PAST SURGICAL HISTORY:   has a past surgical history that includes appendectomy; tonsillectomy; knee surgery; rotator cuff repair rt/lt; turp; hernia repair (2006); Spine surgery; Repair ligament ankle (2/23/2012); Herniorrhaphy ventral (4/17/2013); Replace valve aortic (N/A, 9/3/2015); Esophagoscopy, gastroscopy, duodenoscopy (EGD), combined (N/A, 11/28/2015); TOTAL KNEE ARTHROPLASTY; back surgery; Esophagoscopy, gastroscopy, duodenoscopy (EGD), combined (N/A, 7/26/2018); Thoracotomy, wedge resection lung, combined (Right, 3/26/2019); Lobectomy lung (Right, 3/26/2019); Esophagoscopy, gastroscopy, duodenoscopy (EGD), combined (N/A, 8/17/2020); Esophagoscopy, " gastroscopy, duodenoscopy (EGD), combined (N/A, 10/24/2020); Esophagoscopy, gastroscopy, duodenoscopy (EGD), combined (N/A, 4/11/2022); and Arthroplasty knee (Right, 7/25/2022).  FAMILY HISTORY: family history includes C.A.D. in his father; Emphysema in his father.  SOCIAL HISTORY:   reports that he quit smoking about 24 years ago. He has a 110.00 pack-year smoking history. He has never used smokeless tobacco. He reports current alcohol use. He reports that he does not use drugs.  Patient's living condition: lives with spouse    Post Discharge Medication Reconciliation Status: discharge medications reconciled, continue medications without change  Current Outpatient Medications   Medication Sig     acetaminophen (TYLENOL) 325 MG tablet Take 2 tablets (650 mg) by mouth every 4 hours as needed for other (mild pain)     albuterol (PROAIR HFA/PROVENTIL HFA/VENTOLIN HFA) 108 (90 Base) MCG/ACT inhaler Inhale 1-2 puffs into the lungs every 6 hours as needed     albuterol (PROVENTIL) (2.5 MG/3ML) 0.083% neb solution Take 1 vial (2.5 mg) by nebulization every 6 hours as needed for shortness of breath / dyspnea or wheezing     apixaban ANTICOAGULANT (ELIQUIS) 5 MG tablet Take 1 tablet (5 mg) by mouth 2 times daily     atorvastatin (LIPITOR) 10 MG tablet Take 1 tablet (10 mg) by mouth daily     cephALEXin (KEFLEX) 250 MG capsule Take 1 capsule (250 mg) by mouth 4 times daily     dimenhyDRINATE (DRAMAMINE) 50 MG tablet Take 50 mg by mouth as needed     DULoxetine (CYMBALTA) 60 MG capsule Take 60 mg by mouth daily     famotidine (PEPCID) 40 MG tablet Take 40 mg by mouth 2 times daily     ferrous sulfate (FE TABS) 325 (65 Fe) MG EC tablet Take 325 mg by mouth 2 times daily     fluticasone-vilanterol (BREO ELLIPTA) 200-25 MCG/INH inhaler Inhale 1 puff into the lungs daily     gabapentin (NEURONTIN) 300 MG capsule Take 600 mg by mouth At Bedtime (2 x 300 mg = 600 mg)     metoprolol succinate ER (TOPROL XL) 25 MG 24 hr tablet Take  "0.5 tablets (12.5 mg) by mouth 2 times daily ; Hold for SBP<100 or HR<55.     ondansetron (ZOFRAN ODT) 4 MG ODT tab Take 1 tablet (4 mg) by mouth every 8 hours as needed for nausea Dissolve ON the tongue.     polyethylene glycol (MIRALAX) 17 g packet Take 17 g by mouth daily     senna-docusate (SENOKOT-S/PERICOLACE) 8.6-50 MG tablet Take 1 tablet by mouth 2 times daily as needed for constipation Take while on oral narcotics to prevent or treat constipation.     torsemide (DEMADEX) 10 MG tablet Take 1 tablet (10 mg) by mouth daily ; Hold for SBP<100.     ACE/ARB/ARNI NOT PRESCRIBED (INTENTIONAL) Please choose reason not prescribed from choices below. (Patient not taking: Reported on 8/1/2022)     acetaminophen (TYLENOL) 500 MG tablet Take 2 tablets (1,000 mg) by mouth HOLD while taking 650 mg tylenol prescribed from surgery (Patient not taking: Reported on 8/1/2022)     No current facility-administered medications for this visit.     ROS:  4 point ROS of systems including Constitutional, Respiratory, Cardiovascular, Gastroenterology, Musculoskeletal, were all negative except for pertinent positives noted in my HPI.    Vitals:  BP (!) 154/76   Pulse 85   Temp 97.9  F (36.6  C)   Resp 18   Ht 1.778 m (5' 10\")   Wt 71.4 kg (157 lb 4.8 oz)   SpO2 90%   BMI 22.57 kg/m    Exam:  GENERAL APPEARANCE:  Alert, in no distress  ENT:  Mouth and posterior oropharynx normal, moist mucous membranes  EYES:  EOM, conjunctivae, lids, pupils and irises normal  RESP:  respiratory effort and palpation of chest normal, lungs clear to auscultation , no respiratory distress  CV:  Palpation and auscultation of heart done , regular rate and rhythm, no murmur, rub, or gallop  ABDOMEN:  normal bowel sounds, soft, nontender, no hepatosplenomegaly or other masses  M/S:   No edema in BLE  SKIN:  Aquacel dressing in place on right knee  NEURO:   Cranial nerves 2-12 are normal tested and grossly at patient's baseline  PSYCH:  oriented to " person    Lab/Diagnostic data:  Labs done in SNF [facility] in [facility]. Please refer to them using [employer] Everywhere.    ASSESSMENT/PLAN:  Right Total Knee Arthroplasty 7/25/22. Follow-up with Dr. Deshpande in 2-3 weeks. Discharged on Cephalexin x 1 week. Became lethargic on Tramadol during hospitalization. Continue Acetaminophen as ordered for pain. Takes Apixaban. Physical and Occupational Therapy.     Right-Sided Heart Failure with Hypotension and Type II NSTEMI. New this hospitalization. Started on Torsemide. Follow-up echocardiogram scheduled for 8/22/22 with Cardiology follow-up 8/26/22. Monitor weights and blood pressures. Also on Metoprolol.      Acute Blood Loss Anemia with History of Iron Deficiency Anemia. Secondary to above as well as lymphoma and iron deficiency. Baseline Hemoglobin 9-10. Last Hemoglobin 9.5 on 7/28/22. Repeat CBC on 8/3/22 to ensure stability. Takes Ferrous Sulfate.     Chronic Kidney Disease Stage III. With FRED during hospitalization. Baseline Creatinine 1.1-1.3. Last Creatinine 0.87 on 7/30/22. Repeat BMP on 8/3/22 to ensure stability.     Cognitive Impairment. With lethargy, confusion and agitation during hospitalization. Question if secondary to medications. Occupational Therapy to evaluate cognitive status.    Chronic Obstructive Pulmonary Disease. Continue Fluticasone-Vilanterol and Albuterol as ordered.    Atrial Flutter with RVR and Chronic Atrial Fibrillation. Monitor heart rate daily. Taking Metoprolol and Apixaban. Monitor closely for hypotension while taking Metoprolol.     Aortic Stenosis S/P TAVR with Bioprosthetic Valve 2015 and Hyperlipidemia. Continue Atorvastatin as ordered.    Peripheral Neuropathy. Continue Duloxetine and Gabapentin as ordered.    Gastroesophageal Reflux Disease. Continue Famotidine as ordered.    Bullous Pemphigoid with Chronic Pruritis. Continue Hydroxyzine as ordered.    MGUS with History of Stage IV Non-Hodgkin's Lymphoma. Followed by   [facility]. Treated with systemic chemotherapy in 2012. Now in remission.     Abdominal Aortic Aneurysm. Noted on 4/10/22 chest, abdominal, and pelvic CT to have 3.5 cm saccular infrarenal. Follow-up outpatient as indicated.    Skin Rash on Leg. Did not visualize during today's visit. Will need to follow-up later this week.    Constipation. Continue Senna-S and Torsemide as ordered.    Frequent Falls and Physical Deconditioning. Related to recent hospitalization and co-morbidities. Physical and Occupational Therapy ordered.    Orders:  CBC, BMP on 8/3/22    Total time spent with patient visit at the skilled nursing facility was 35 min including patient visit and review of past records. Greater than 50% of total time spent with counseling and coordinating care due to review of hospitalization, review of orders, discussion with staff, assessment of incision, and clarification of admission orders.    Electronically signed by:  ILYA Garcia CNP

## 2022-08-01 NOTE — LETTER
[facility]   To:   [facility]          Please give to facility    From:   LUIS Hayes Care Coordinator [facility]     Patient Name:  Hermilo Velasquez  YOB: 1932    Admit date: 7/31/2022      *Information Needed:  Please contact me when the patient will discharge (or if they will move to long term care)- include the discharge date, disposition, and main diagnosis   - If the patient is discharged with home care services, please provide the name of the agency    Phone, Fax or Email with information       Thank You,   Ana Johnston, JERMAINE, MSW  Clinic Care Coordinator  [facility] [place], [place], and [place]  Ph: [phone]  [email or web address]

## 2022-08-01 NOTE — LETTER
8/1/2022        RE: Hermilo Velasquez  [address]  [facility] [address]        [facility] GERIATRICS  PRIMARY CARE PROVIDER AND CLINIC:  Karson Bishop MD, [address] / [place] [address]  Chief Complaint   Patient presents with     Hospital F/U      [facility] Medical Record Number:  [id number]  [facility]    Hermilo Velasquez  is a 89 year old  (11/3/1932), admitted to the above facility from  [facility]. Hospital stay 7/25/22 through 7/31/22.     HPI:    This is an 89-year-old male, with a past medical history significant for chronic atrial fibrillation, chronic obstructive lung disease, chronic kidney disease stage IIIa, aortic stenosis s/p TAVR with bioprosthetic valve 2015, chronic peripheral neuropathy, dyslipidemia, GERD, bullous pemphigoid chronic MGUS, stage IV non-Hodgkin's lymphoma in remission, abdominal aortic aneurysm, DVT, recurrent pulmonary embolism, and lung cancer s/p wedge resected in 2019, who was admitted to [facility] 7/25/2022 through 7/31/2022 for a right total knee arthroplasty on 7/25/2022.  Postoperatively, became lethargic and hypoxic. Tramadol was discontinued. Chest x-ray with low lung volumes and mild atelectasis. Echocardiogram on 7/27/22 with EF 55-60%, RV severely dilated and severely decreased RV systolic function. Flattened septum consistent with RV pressure/volume overload. Cardiology felt new right-sided heart failure may be related to anesthesia with decreased baseline reserve. Torsemide was initiated. Metoprolol was held due to hypotension, but restarted due to tachycardia. Troponin was elevated and thought to be due to demand ischemia. A TCU stay was recommended for ongoing physical rehabilitation.     Today, patient is sitting in his bed. Initially, does not respond to questions, but when provider steps closer, patient does  "respond with on word answer. Denies pain or shortness of breath.     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 Vasques      Aortic stenosis     Severe AS, 9/2015 AVR - [facility] TRIFECTA Bovine bioprosthesis 25MM TF-25A     Atrial fibrillation (H)     9/2015 Paroxysmal post op Afib - discharged on Warfarin and a beta blocker     COPD (chronic obstructive pulmonary disease) (H)      Deep vein thrombosis (H)      GERD (gastroesophageal reflux disease)      Heart murmur      Monoclonal gammopathy     plasmacyte prominent causing monoclonal gammopathy     Need for SBE (subacute bacterial endocarditis) prophylaxis      Neuropathy      Other and unspecified hyperlipidemia      Other malignant lymphomas     non hodgkin's lymphoma     RBBB (right bundle branch block)      Severe sepsis with acute organ dysfunction (H) 11/16/2015     Unspecified hereditary and idiopathic peripheral neuropathy       PAST SURGICAL HISTORY:   has a past surgical history that includes appendectomy; tonsillectomy; knee surgery; rotator cuff repair rt/lt; turp; hernia repair (2006); Spine surgery; Repair ligament ankle (2/23/2012); Herniorrhaphy ventral (4/17/2013); Replace valve aortic (N/A, 9/3/2015); Esophagoscopy, gastroscopy, duodenoscopy (EGD), combined (N/A, 11/28/2015); TOTAL KNEE ARTHROPLASTY; back surgery; Esophagoscopy, gastroscopy, duodenoscopy (EGD), combined (N/A, 7/26/2018); Thoracotomy, wedge resection lung, combined (Right, 3/26/2019); Lobectomy lung (Right, 3/26/2019); " Esophagoscopy, gastroscopy, duodenoscopy (EGD), combined (N/A, 8/17/2020); Esophagoscopy, gastroscopy, duodenoscopy (EGD), combined (N/A, 10/24/2020); Esophagoscopy, gastroscopy, duodenoscopy (EGD), combined (N/A, 4/11/2022); and Arthroplasty knee (Right, 7/25/2022).  FAMILY HISTORY: family history includes C.A.D. in his father; Emphysema in his father.  SOCIAL HISTORY:   reports that he quit smoking about 24 years ago. He has a 110.00 pack-year smoking history. He has never used smokeless tobacco. He reports current alcohol use. He reports that he does not use drugs.  Patient's living condition: lives with spouse    Post Discharge Medication Reconciliation Status: discharge medications reconciled, continue medications without change  Current Outpatient Medications   Medication Sig     acetaminophen (TYLENOL) 325 MG tablet Take 2 tablets (650 mg) by mouth every 4 hours as needed for other (mild pain)     albuterol (PROAIR HFA/PROVENTIL HFA/VENTOLIN HFA) 108 (90 Base) MCG/ACT inhaler Inhale 1-2 puffs into the lungs every 6 hours as needed     albuterol (PROVENTIL) (2.5 MG/3ML) 0.083% neb solution Take 1 vial (2.5 mg) by nebulization every 6 hours as needed for shortness of breath / dyspnea or wheezing     apixaban ANTICOAGULANT (ELIQUIS) 5 MG tablet Take 1 tablet (5 mg) by mouth 2 times daily     atorvastatin (LIPITOR) 10 MG tablet Take 1 tablet (10 mg) by mouth daily     cephALEXin (KEFLEX) 250 MG capsule Take 1 capsule (250 mg) by mouth 4 times daily     dimenhyDRINATE (DRAMAMINE) 50 MG tablet Take 50 mg by mouth as needed     DULoxetine (CYMBALTA) 60 MG capsule Take 60 mg by mouth daily     famotidine (PEPCID) 40 MG tablet Take 40 mg by mouth 2 times daily     ferrous sulfate (FE TABS) 325 (65 Fe) MG EC tablet Take 325 mg by mouth 2 times daily     fluticasone-vilanterol (BREO ELLIPTA) 200-25 MCG/INH inhaler Inhale 1 puff into the lungs daily     gabapentin (NEURONTIN) 300 MG capsule Take 600 mg by mouth At  "Bedtime (2 x 300 mg = 600 mg)     metoprolol succinate ER (TOPROL XL) 25 MG 24 hr tablet Take 0.5 tablets (12.5 mg) by mouth 2 times daily ; Hold for SBP<100 or HR<55.     ondansetron (ZOFRAN ODT) 4 MG ODT tab Take 1 tablet (4 mg) by mouth every 8 hours as needed for nausea Dissolve ON the tongue.     polyethylene glycol (MIRALAX) 17 g packet Take 17 g by mouth daily     senna-docusate (SENOKOT-S/PERICOLACE) 8.6-50 MG tablet Take 1 tablet by mouth 2 times daily as needed for constipation Take while on oral narcotics to prevent or treat constipation.     torsemide (DEMADEX) 10 MG tablet Take 1 tablet (10 mg) by mouth daily ; Hold for SBP<100.     ACE/ARB/ARNI NOT PRESCRIBED (INTENTIONAL) Please choose reason not prescribed from choices below. (Patient not taking: Reported on 8/1/2022)     acetaminophen (TYLENOL) 500 MG tablet Take 2 tablets (1,000 mg) by mouth HOLD while taking 650 mg tylenol prescribed from surgery (Patient not taking: Reported on 8/1/2022)     No current facility-administered medications for this visit.     ROS:  4 point ROS of systems including Constitutional, Respiratory, Cardiovascular, Gastroenterology, Musculoskeletal, were all negative except for pertinent positives noted in my HPI.    Vitals:  BP (!) 154/76   Pulse 85   Temp 97.9  F (36.6  C)   Resp 18   Ht 1.778 m (5' 10\")   Wt 71.4 kg (157 lb 4.8 oz)   SpO2 90%   BMI 22.57 kg/m    Exam:  GENERAL APPEARANCE:  Alert, in no distress  ENT:  Mouth and posterior oropharynx normal, moist mucous membranes  EYES:  EOM, conjunctivae, lids, pupils and irises normal  RESP:  respiratory effort and palpation of chest normal, lungs clear to auscultation , no respiratory distress  CV:  Palpation and auscultation of heart done , regular rate and rhythm, no murmur, rub, or gallop  ABDOMEN:  normal bowel sounds, soft, nontender, no hepatosplenomegaly or other masses  M/S:   No edema in BLE  SKIN:  Aquacel dressing in place on right knee  NEURO:   " Cranial nerves 2-12 are normal tested and grossly at patient's baseline  PSYCH:  oriented to person    Lab/Diagnostic data:  Labs done in SNF are in [place] EPIC. Please refer to them using [facility] Everywhere.    ASSESSMENT/PLAN:  Right Total Knee Arthroplasty 7/25/22. Follow-up with Dr. Deshpande in 2-3 weeks. Discharged on Cephalexin x 1 week. Became lethargic on Tramadol during hospitalization. Continue Acetaminophen as ordered for pain. Takes Apixaban. Physical and Occupational Therapy.     Right-Sided Heart Failure with Hypotension and Type II NSTEMI. New this hospitalization. Started on Torsemide. Follow-up echocardiogram scheduled for 8/22/22 with Cardiology follow-up 8/26/22. Monitor weights and blood pressures. Also on Metoprolol.      Acute Blood Loss Anemia with History of Iron Deficiency Anemia. Secondary to above as well as lymphoma and iron deficiency. Baseline Hemoglobin 9-10. Last Hemoglobin 9.5 on 7/28/22. Repeat CBC on 8/3/22 to ensure stability. Takes Ferrous Sulfate.     Chronic Kidney Disease Stage III. With FRED during hospitalization. Baseline Creatinine 1.1-1.3. Last Creatinine 0.87 on 7/30/22. Repeat BMP on 8/3/22 to ensure stability.     Cognitive Impairment. With lethargy, confusion and agitation during hospitalization. Question if secondary to medications. Occupational Therapy to evaluate cognitive status.    Chronic Obstructive Pulmonary Disease. Continue Fluticasone-Vilanterol and Albuterol as ordered.    Atrial Flutter with RVR and Chronic Atrial Fibrillation. Monitor heart rate daily. Taking Metoprolol and Apixaban. Monitor closely for hypotension while taking Metoprolol.     Aortic Stenosis S/P TAVR with Bioprosthetic Valve 2015 and Hyperlipidemia. Continue Atorvastatin as ordered.    Peripheral Neuropathy. Continue Duloxetine and Gabapentin as ordered.    Gastroesophageal Reflux Disease. Continue Famotidine as ordered.    Bullous Pemphigoid with Chronic Pruritis. Continue Hydroxyzine  as ordered.    MGUS with History of Stage IV Non-Hodgkin's Lymphoma. Followed by Dr. Zurita of [facility]. Treated with systemic chemotherapy in 2012. Now in remission.     Abdominal Aortic Aneurysm. Noted on 4/10/22 chest, abdominal, and pelvic CT to have 3.5 cm saccular infrarenal. Follow-up outpatient as indicated.    Skin Rash on Leg. Did not visualize during today's visit. Will need to follow-up later this week.    Constipation. Continue Senna-S and Torsemide as ordered.    Frequent Falls and Physical Deconditioning. Related to recent hospitalization and co-morbidities. Physical and Occupational Therapy ordered.    Orders:  CBC, BMP on 8/3/22    Total time spent with patient visit at the skilled nursing facility was 35 min including patient visit and review of past records. Greater than 50% of total time spent with counseling and coordinating care due to review of hospitalization, review of orders, discussion with staff, assessment of incision, and clarification of admission orders.    Electronically signed by:  ILYA Garcia CNP                     Sincerely,        Tonya Lynn Haase, APRN CNP

## 2022-08-01 NOTE — PROGRESS NOTES
Clinic Care Coordination Contact  Care Coordination Transition Communication    Clinical Data: [facility]  Hospitalist Discharge Summary       Date of Admission:  7/25/2022  Date of Discharge:  7/31/2022  Discharging Provider: Bennie Rodney MD  Discharge Service: Hospitalist Service     Discharge Diagnoses     1. Status post right total knee arthroplasty 7/25/2022  2. Acute right heart failure   3. Acute hypoxic respiratory failure, suspect related to above  4. Type 2 non-ST-elevation myocardial infarction   5. Acute blood loss anemia   6. Acute kidney injury   7. Acute metabolic encephalopathy    8. Possible dementia       Transition to Facility:              [facility] Name: [facility]              Contact name and phone number/fax: [phone]/ [phone]    Plan: RN/SW Care Coordinator will await notification from facility staff informing RN/SW Care Coordinator of patient's discharge plans/needs. RN/SW Care Coordinator will review chart and outreach to facility staff every 4 weeks and as needed. Fax sent to TCU with my contact information requesting notification upon discharge.    Ana Johnston [facility]  Clinic Care Coordinator  [facility]  [phone]  [email or web address]

## 2022-08-01 NOTE — LETTER
[facility]   To:   [facility]          Please give to facility    From:   LUIS Hayes Care Coordinator [facility]     Patient Name:  Hermilo Velasquez  YOB: 1932    Admit date: 7/31/2022      *Information Needed:  Please contact me when the patient will discharge (or if they will move to long term care)- include the discharge date, disposition, and main diagnosis   - If the patient is discharged with home care services, please provide the name of the agency    Phone, Fax or Email with information       Thank You,   Ana Johnston, JERMAINE, MSW  Clinic Care Coordinator  [facility] [place], [place], and [place]  Ph: [phone]  [email or web address]

## 2022-08-02 ENCOUNTER — TELEPHONE (OUTPATIENT)
Dept: CARDIOLOGY | Facility: CLINIC | Age: 87
End: 2022-08-02

## 2022-08-02 RX ORDER — ACETAMINOPHEN 500 MG
1000 TABLET ORAL 3 TIMES DAILY PRN
Status: ON HOLD | COMMUNITY
End: 2024-01-01

## 2022-08-02 NOTE — TELEPHONE ENCOUNTER
Patient was admitted to [facility] on 7/25/22 for elective right TKA via spinal anesthesia.    PMH: richard PE in 2016, RLL PE in 2020, DVT, severe aortic stenosis with bioprosthetic AVR, COPD, A. Fib, atrial fibrillation, non-Hodgkin's lymphoma s/p chemotherapy, h/o lung cancer s/p resection, peripheral neuropathy, GERD.    Echo showed EF of 55-60%; RV severely dilated with severely decreased RV systolic function; flattened septum is consistent with RV pressure/volume overload; bioprosthetic AVR (noted that gradient improved from 4/2021).    7/26/22: Pt with hypotension, tachycardia, lethargic with new right sided HF with acute hypoxic resp failure, elevated troponin-suspect demand ischemia vs ACS, question related to stress from anesthesia. Cardiology consulted. BP's improved with IVF's, Metoprolol and Tramadol held. Started on Torsemide.    7/28/22: RLE US negative for DVT. CT chest was negative for PE; minimal effusions with probable compressive atelectasis.     Pt was started on Keflex, Torsemide and PTA Metoprolol dosage increased and Eliquis continued at time of discharge.    RN called [facility]CU to confirm the follow up plan for patient with Care Coordinator.     RN confirmed with Care Coordinator that patient has an echo scheduled on 8/22/22 at 1100, and an OV scheduled  on 8/26/22 at 0845 with Dr. Grijalva at our [facility]. YASIR Patterson RN.

## 2022-08-03 ENCOUNTER — TRANSITIONAL CARE UNIT VISIT (OUTPATIENT)
Dept: GERIATRICS | Facility: CLINIC | Age: 87
End: 2022-08-03
Payer: COMMERCIAL

## 2022-08-03 VITALS
HEART RATE: 70 BPM | HEIGHT: 70 IN | OXYGEN SATURATION: 92 % | BODY MASS INDEX: 22.45 KG/M2 | WEIGHT: 156.8 LBS | RESPIRATION RATE: 16 BRPM | SYSTOLIC BLOOD PRESSURE: 115 MMHG | DIASTOLIC BLOOD PRESSURE: 67 MMHG | TEMPERATURE: 97.7 F

## 2022-08-03 DIAGNOSIS — D50.0 IRON DEFICIENCY ANEMIA DUE TO CHRONIC BLOOD LOSS: ICD-10-CM

## 2022-08-03 DIAGNOSIS — Z96.651 AFTERCARE FOLLOWING RIGHT KNEE JOINT REPLACEMENT SURGERY: ICD-10-CM

## 2022-08-03 DIAGNOSIS — I10 ESSENTIAL HYPERTENSION WITH GOAL BLOOD PRESSURE LESS THAN 140/90: ICD-10-CM

## 2022-08-03 DIAGNOSIS — J44.0 CHRONIC OBSTRUCTIVE PULMONARY DISEASE WITH ACUTE LOWER RESPIRATORY INFECTION (H): ICD-10-CM

## 2022-08-03 DIAGNOSIS — I48.0 PAROXYSMAL ATRIAL FIBRILLATION (H): ICD-10-CM

## 2022-08-03 DIAGNOSIS — R41.89 COGNITIVE IMPAIRMENT: ICD-10-CM

## 2022-08-03 DIAGNOSIS — M17.11 PRIMARY OSTEOARTHRITIS OF RIGHT KNEE: Primary | ICD-10-CM

## 2022-08-03 DIAGNOSIS — Z47.1 AFTERCARE FOLLOWING RIGHT KNEE JOINT REPLACEMENT SURGERY: ICD-10-CM

## 2022-08-03 DIAGNOSIS — D62 ANEMIA DUE TO BLOOD LOSS, ACUTE: ICD-10-CM

## 2022-08-03 DIAGNOSIS — R53.81 PHYSICAL DECONDITIONING: ICD-10-CM

## 2022-08-03 DIAGNOSIS — N18.30 STAGE 3 CHRONIC KIDNEY DISEASE, UNSPECIFIED WHETHER STAGE 3A OR 3B CKD (H): ICD-10-CM

## 2022-08-03 LAB
ATRIAL RATE - MUSE: 277 BPM
ATRIAL RATE - MUSE: 73 BPM
DIASTOLIC BLOOD PRESSURE - MUSE: NORMAL MMHG
DIASTOLIC BLOOD PRESSURE - MUSE: NORMAL MMHG
INTERPRETATION ECG - MUSE: NORMAL
INTERPRETATION ECG - MUSE: NORMAL
P AXIS - MUSE: 268 DEGREES
P AXIS - MUSE: 65 DEGREES
PR INTERVAL - MUSE: 210 MS
PR INTERVAL - MUSE: NORMAL MS
QRS DURATION - MUSE: 136 MS
QRS DURATION - MUSE: 150 MS
QT - MUSE: 334 MS
QT - MUSE: 442 MS
QTC - MUSE: 447 MS
QTC - MUSE: 486 MS
R AXIS - MUSE: 1 DEGREES
R AXIS - MUSE: 22 DEGREES
SYSTOLIC BLOOD PRESSURE - MUSE: NORMAL MMHG
SYSTOLIC BLOOD PRESSURE - MUSE: NORMAL MMHG
T AXIS - MUSE: 51 DEGREES
T AXIS - MUSE: 58 DEGREES
VENTRICULAR RATE- MUSE: 108 BPM
VENTRICULAR RATE- MUSE: 73 BPM

## 2022-08-03 PROCEDURE — 99310 SBSQ NF CARE HIGH MDM 45: CPT | Performed by: NURSE PRACTITIONER

## 2022-08-03 NOTE — LETTER
"    8/3/2022        RE: Hermilo Velasquez  [address] [facility] [address]        [facility]    Chief Complaint   Patient presents with     Nursing Home Acute     HPI:  Hermilo Velasquez is a 89 year old  (11/3/1932), who is being seen today for an episodic care visit at: [facility]) [25]. Today's concern is:   DJD right knee s/p right TKA: on exam today patient sitting up in w/c, states pain in right knee is rated 6/10, states he is working with therapy, walking up to 220 feet using a RW with SBA  Cognitive impairment: BIMS 9/15, SBT not tested  Anemia: see labs  COPD: denies SOB, cough, congestion, SaO2 95-98% on room air  HTN/PAF/CKD: /67, 105/66, 103/55 with HR 70-80 range, denies CP, palpitations    Allergies, and PMH/PSH reviewed in [facility] today.  REVIEW OF SYSTEMS:  10 point ROS of systems including Constitutional, Eyes, Respiratory, Cardiovascular, Gastroenterology, Genitourinary, Integumentary, Musculoskeletal, Psychiatric were all negative except for pertinent positives noted in my HPI.    Objective:   /67   Pulse 70   Temp 97.7  F (36.5  C)   Resp 16   Ht 1.778 m (5' 10\")   Wt 71.1 kg (156 lb 12.8 oz)   SpO2 92%   BMI 22.50 kg/m    GENERAL APPEARANCE:  Alert, in no distress  ENT:  Mouth and posterior oropharynx normal, moist mucous membranes, Stebbins  EYES:  EOM, conjunctivae, lids, pupils and irises normal, PERRL  RESP:  respiratory effort and palpation of chest normal, lungs clear to auscultation , no respiratory distress  CV:  Palpation and auscultation of heart done , regular rate and rhythm, no murmur, rub, or gallop, peripheral edema 1+ in LE bilaterally  ABDOMEN:  normal bowel sounds, soft, nontender, no hepatosplenomegaly or other masses  M/S:   patient sitting up in w/c  SKIN:  Inspection of skin and subcutaneous tissue baseline, did not visualize surgical incision  NEURO:   speech wnl  PSYCH:  affect and mood normal    Recent labs in [facility] " reviewed by me today.  and   Most Recent 3 CBC's:Recent Labs   Lab Test 07/28/22  1643 07/28/22  0720 07/27/22  0719   WBC 12.9* 15.1* 13.1*   HGB 9.5* 9.9* 9.5*   MCV 89 88 92    151 140*     Most Recent 3 BMP's:Recent Labs   Lab Test 07/30/22  0604 07/29/22  0608 07/28/22  0720    140 139   POTASSIUM 3.5 4.0 3.8   CHLORIDE 107 110* 111*   CO2 28 23 23   BUN 23 24 41*   CR 0.87 0.95 1.31*   ANIONGAP 3 7 5   AMRITA 8.8 8.6 8.5   * 109* 114*       Assessment/Plan:  (M17.11) Primary osteoarthritis of right knee  (primary encounter diagnosis)  (Z47.1,  Z96.651) Aftercare following right knee joint replacement surgery  (R53.81) Physical deconditioning  Comment: acute/ongoing  Plan: PT and OT, tylenol 1000mg TID scheduled , gabapentin 600mg qhs   F/u with ortho as directed    (D62) Anemia due to blood loss, acute  (D50.0) Iron deficiency anemia due to chronic blood loss  Comment: acute/ongoing  Plan: CBC on 8/5/22, continue ferrous sulfate 325mg BID    (J44.0) Chronic obstructive pulmonary disease with acute lower respiratory infection (H)  Comment: ongoing  Plan: monitor SaO2 at rest and with activity, breo ellipta inhaler 1 puff QD, albuterol MDI 1-2 puffs q 6 hours prn,     (I10) Essential hypertension with goal blood pressure less than 140/90  (I48.0) Paroxysmal atrial fib -- on Eliquis  (N18.30) Stage 3 chronic kidney disease, unspecified whether stage 3a or 3b CKD (H)  Comment: ongoing  Plan: BMP on 8/5/22, continue torsemide 10mg QD,  Metoprolol xl 12.5mg BID, apixaban 5mg BID    (R41.89) Cognitive impairment  Comment: ongoing  Plan: BIMS 9/15,  to assist in discharge planning      Orders:  No new orders      Electronically signed by: Tonya Lynn Haase, APRN CNP           Sincerely,        Tonya Lynn Haase, APRN CNP

## 2022-08-04 ENCOUNTER — TRANSITIONAL CARE UNIT VISIT (OUTPATIENT)
Dept: GERIATRICS | Facility: CLINIC | Age: 87
End: 2022-08-04
Payer: COMMERCIAL

## 2022-08-04 VITALS
SYSTOLIC BLOOD PRESSURE: 115 MMHG | DIASTOLIC BLOOD PRESSURE: 67 MMHG | WEIGHT: 157 LBS | BODY MASS INDEX: 22.48 KG/M2 | RESPIRATION RATE: 16 BRPM | HEIGHT: 70 IN | TEMPERATURE: 97.6 F | OXYGEN SATURATION: 94 % | HEART RATE: 70 BPM

## 2022-08-04 DIAGNOSIS — Z96.651 AFTERCARE FOLLOWING RIGHT KNEE JOINT REPLACEMENT SURGERY: Primary | ICD-10-CM

## 2022-08-04 DIAGNOSIS — D62 ANEMIA DUE TO BLOOD LOSS, ACUTE: ICD-10-CM

## 2022-08-04 DIAGNOSIS — J44.0 CHRONIC OBSTRUCTIVE PULMONARY DISEASE WITH ACUTE LOWER RESPIRATORY INFECTION (H): ICD-10-CM

## 2022-08-04 DIAGNOSIS — R41.89 COGNITIVE IMPAIRMENT: ICD-10-CM

## 2022-08-04 DIAGNOSIS — I10 ESSENTIAL HYPERTENSION WITH GOAL BLOOD PRESSURE LESS THAN 140/90: ICD-10-CM

## 2022-08-04 DIAGNOSIS — Z47.1 AFTERCARE FOLLOWING RIGHT KNEE JOINT REPLACEMENT SURGERY: Primary | ICD-10-CM

## 2022-08-04 PROCEDURE — 99305 1ST NF CARE MODERATE MDM 35: CPT | Performed by: INTERNAL MEDICINE

## 2022-08-04 NOTE — PROGRESS NOTES
[facility] GERIATRICS    PRIMARY CARE PROVIDER AND CLINIC:  Karson Bishop MD, [address] [facility] [address] / [facility] [address]  Chief Complaint   Patient presents with     Hospital F/U      [place] Medical Record Number:  [id number]  Place of Service where encounter took place:  [facility] ([facility])     Hermilo Velasquez  is a 89 year old  (11/3/1932), admitted to the above facility from  [facility]. Hospital stay 7/25/22 through 7/31/22..      Hospital course was reviewed by me, is as per the hospital discharge summary and nurse practitioner note.    Patient underwent an elective right total knee arthroplasty on 7/25/2022.  He has a complex past medical history, remarkable for chronic atrial fibrillation, chronic obstructive lung disease, chronic kidney disease stage IIIa, aortic stenosis s/p TAVR with bioprosthetic valve 2015, chronic peripheral neuropathy, dyslipidemia, GERD, bullous pemphigoid chronic MGUS, stage IV non-Hodgkin's lymphoma in remission, abdominal aortic aneurysm, DVT, recurrent pulmonary embolism, and lung cancer s/p wedge resection 2019.    Postoperative course was complicated by hypoxia and lethargy.  Chest x-ray revealed atelectasis.  Echocardiogram revealed normal LV systolic function but severely decreased RV systolic function.  Cardiology felt right-sided heart failure may have been secondary to anesthesia as well as volume overload.  Torsemide restarted.  Metoprolol initially was held but restarted secondary to tachycardia.  Troponin levels elevated, felt secondary to demand ischemia.    Patient states he is feeling fairly well.  He states right knee pain is fairly well controlled.  He notes shortness of breath with activity baseline.  Denies chest pain or cough.  Denies difficulty swallowing, fevers, chills, abdominal pain, nausea, vomiting.  He states at baseline he uses oxygen as needed, but is vague in this regard.      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 Vasques      Aortic stenosis     Severe AS, 9/2015 AVR - [facility] TRIFECTA Bovine bioprosthesis 25MM TF-25A     Atrial fibrillation (H)     9/2015 Paroxysmal post op Afib - discharged on Warfarin and a beta blocker     COPD (chronic obstructive pulmonary disease) (H)      Deep vein thrombosis (H)      GERD (gastroesophageal reflux disease)      Heart murmur      Monoclonal gammopathy     plasmacyte prominent causing monoclonal gammopathy     Need for SBE (subacute bacterial endocarditis) prophylaxis      Neuropathy      Other and unspecified hyperlipidemia      Other malignant lymphomas     non hodgkin's lymphoma     RBBB (right bundle branch block)      Severe sepsis with acute organ dysfunction (H) 11/16/2015     Unspecified hereditary and idiopathic peripheral neuropathy       PAST SURGICAL HISTORY:   has a past surgical history that includes appendectomy; tonsillectomy; knee surgery; rotator cuff repair rt/lt; turp; hernia repair (2006); Spine surgery; Repair ligament ankle (2/23/2012); Herniorrhaphy ventral (4/17/2013); Replace valve aortic (N/A, 9/3/2015); Esophagoscopy, gastroscopy, duodenoscopy (EGD), combined (N/A, 11/28/2015); TOTAL KNEE ARTHROPLASTY; back surgery; Esophagoscopy, gastroscopy, duodenoscopy (EGD), combined (N/A, 7/26/2018); Thoracotomy, wedge resection lung, combined (Right, 3/26/2019); Lobectomy lung (Right, 3/26/2019); Esophagoscopy, gastroscopy, duodenoscopy (EGD), combined (N/A, 8/17/2020); Esophagoscopy, " gastroscopy, duodenoscopy (EGD), combined (N/A, 10/24/2020); Esophagoscopy, gastroscopy, duodenoscopy (EGD), combined (N/A, 4/11/2022); and Arthroplasty knee (Right, 7/25/2022).  FAMILY HISTORY: family history includes C.A.D. in his father; Emphysema in his father.  SOCIAL HISTORY:   reports that he quit smoking about 24 years ago. He has a 110.00 pack-year smoking history. He has never used smokeless tobacco. He reports current alcohol use. He reports that he does not use drugs.  Patient's living condition: lives with spouse    Current medications were reviewed by me.     Current Outpatient Medications   Medication Sig     ACE/ARB/ARNI NOT PRESCRIBED (INTENTIONAL) Please choose reason not prescribed from choices below. (Patient not taking: Reported on 8/1/2022)     acetaminophen (TYLENOL) 500 MG tablet Take 1,000 mg by mouth 3 times daily     albuterol (PROAIR HFA/PROVENTIL HFA/VENTOLIN HFA) 108 (90 Base) MCG/ACT inhaler Inhale 1-2 puffs into the lungs every 6 hours as needed     albuterol (PROVENTIL) (2.5 MG/3ML) 0.083% neb solution Take 1 vial (2.5 mg) by nebulization every 6 hours as needed for shortness of breath / dyspnea or wheezing     apixaban ANTICOAGULANT (ELIQUIS) 5 MG tablet Take 1 tablet (5 mg) by mouth 2 times daily     atorvastatin (LIPITOR) 10 MG tablet Take 1 tablet (10 mg) by mouth daily     cephALEXin (KEFLEX) 250 MG capsule Take 1 capsule (250 mg) by mouth 4 times daily     dimenhyDRINATE (DRAMAMINE) 50 MG tablet Take 50 mg by mouth as needed     DULoxetine (CYMBALTA) 60 MG capsule Take 60 mg by mouth daily     famotidine (PEPCID) 40 MG tablet Take 40 mg by mouth 2 times daily     ferrous sulfate (FE TABS) 325 (65 Fe) MG EC tablet Take 325 mg by mouth 2 times daily     fluticasone-vilanterol (BREO ELLIPTA) 200-25 MCG/INH inhaler Inhale 1 puff into the lungs daily     gabapentin (NEURONTIN) 300 MG capsule Take 600 mg by mouth At Bedtime (2 x 300 mg = 600 mg)     metoprolol succinate ER (TOPROL  "XL) 25 MG 24 hr tablet Take 0.5 tablets (12.5 mg) by mouth 2 times daily ; Hold for SBP<100 or HR<55.     ondansetron (ZOFRAN ODT) 4 MG ODT tab Take 1 tablet (4 mg) by mouth every 8 hours as needed for nausea Dissolve ON the tongue.     polyethylene glycol (MIRALAX) 17 g packet Take 17 g by mouth daily     senna-docusate (SENOKOT-S/PERICOLACE) 8.6-50 MG tablet Take 1 tablet by mouth 2 times daily as needed for constipation Take while on oral narcotics to prevent or treat constipation.     torsemide (DEMADEX) 10 MG tablet Take 1 tablet (10 mg) by mouth daily ; Hold for SBP<100.     No current facility-administered medications for this visit.       ROS:  10 point ROS of systems including Constitutional, Eyes, Respiratory, Cardiovascular, Gastroenterology, Genitourinary, Integumentary, Musculoskeletal, Psychiatric were all negative except for pertinent positives noted in my HPI.    Vitals:  /67   Pulse 70   Temp 97.6  F (36.4  C)   Resp 16   Ht 1.778 m (5' 10\")   Wt 71.2 kg (157 lb)   SpO2 94%   BMI 22.53 kg/m    Exam:  Thin, appearing male, sitting up in bed, appears comfortable  HEENT missing multiple teeth  Oral mucosa moist  No eye redness or drainage  Neck no adenopathy  RESP:  RR 14, unlabored, not currently on 02  CV RRR no M  Abd soft, non-distended, no masses  Extremities: occlusive dressing R knee intact  2 + edema R calf and ankle  No calf tenderness  NEURO, alert, oriented to person, place, answers questions hesitantly  Face symmetric      Labs were reviewed by me.    No recent ABG in Epic  Most Recent 3 CBC's:Recent Labs   Lab Test 07/28/22  1643 07/28/22  0720 07/27/22  0719   WBC 12.9* 15.1* 13.1*   HGB 9.5* 9.9* 9.5*   MCV 89 88 92    151 140*     Most Recent 3 BMP's:Recent Labs   Lab Test 07/30/22  0604 07/29/22  0608 07/28/22  0720    140 139   POTASSIUM 3.5 4.0 3.8   CHLORIDE 107 110* 111*   CO2 28 23 23   BUN 23 24 41*   CR 0.87 0.95 1.31*   ANIONGAP 3 7 5   AMRITA 8.8 8.6 8.5 "   * 109* 114*     Most Recent 2 LFT's:Recent Labs   Lab Test 04/11/22  0744 04/10/22  2034   AST 17 11   ALT 10 10   ALKPHOS 78 91   BILITOTAL 0.3 0.5       ASSESSMENT/PLAN:      (M17.11) Primary osteoarthritis of right knee  (primary encounter diagnosis)  (Z47.1,  Z96.651) Aftercare following right knee joint replacement surgery  (R53.81) Physical deconditioning  Comment: post op encephalopathy secondary to anesthesia, hypoxia secondary to atelectasis, RV overload  Plan: PT and OT, tylenol 1000mg TID scheduled minimize CNS active medications.  Continue PTA gabapentin (periphera neurropathy) Ortho f/u. Bowel regimen.  Chronic AC with apixaban      (D62) Anemia due to blood loss, acute  (D50.0) Iron deficiency anemia due to chronic blood loss (recent finding of erosive gastropathy, colonic diverticulosis and AVm but no active bleeding)  Comment: Hgb stable  Plan: monitor Hgb, continue ferrous sulfate   Continue famotidine   Outpatient f/u for chronic Fe deficiency     (J44.0) Chronic obstructive pulmonary disease   R sided heart failure, exacerbated by surgery  Post op hypoxia secondary to atelectasis, volume overload  Unclear need for prn 02 at baseline  Plan: monitor 02 sats, continue inhalers, continue newly started Torsemide  Monitor vitals, BMP     (I10) Essential hypertension with goal blood pressure less than 140/90  (I48.0) Paroxysmal atrial fib -- on Eliquis  (N18.30) Stage 3 chronic kidney disease, unspecified whether stage 3a or 3b CKD (H)  Comment: renal function stable during hospitalization  Plan: continue torsemide 10mg QD,  Metoprolol xl 12.5mg BID, apixaban 5mg BID, monitor vitals, BMP     (R41.89) Cognitive impairment  Comment: acute encephalopathy, apparently superimposed upon chronic cognitive impairment  BIMS 9/15  Plan monitor mental status, review with wife, assure safe discharge       Charles You MD

## 2022-08-04 NOTE — LETTER
8/4/2022        RE: Hermilo Velasquez  [address]  [facility] [address]        [facility] GERIATRICS    PRIMARY CARE PROVIDER AND CLINIC:  Karson Bishop MD, [address] [facility] [address] / [facility] [address]  Chief Complaint   Patient presents with     Hospital F/U      [place] Medical Record Number:  [id number]  Place of Service where encounter took place:  [facility] ([facility])     Hermilo Velasquez  is a 89 year old  (11/3/1932), admitted to the above facility from  [facility]. Hospital stay 7/25/22 through 7/31/22..      Hospital course was reviewed by me, is as per the hospital discharge summary and nurse practitioner note.    Patient underwent an elective right total knee arthroplasty on 7/25/2022.  He has a complex past medical history, remarkable for chronic atrial fibrillation, chronic obstructive lung disease, chronic kidney disease stage IIIa, aortic stenosis s/p TAVR with bioprosthetic valve 2015, chronic peripheral neuropathy, dyslipidemia, GERD, bullous pemphigoid chronic MGUS, stage IV non-Hodgkin's lymphoma in remission, abdominal aortic aneurysm, DVT, recurrent pulmonary embolism, and lung cancer s/p wedge resection 2019.    Postoperative course was complicated by hypoxia and lethargy.  Chest x-ray revealed atelectasis.  Echocardiogram revealed normal LV systolic function but severely decreased RV systolic function.  Cardiology felt right-sided heart failure may have been secondary to anesthesia as well as volume overload.  Torsemide restarted.  Metoprolol initially was held but restarted secondary to tachycardia.  Troponin levels elevated, felt secondary to demand ischemia.    Patient states he is feeling fairly well.  He states right knee pain is fairly well controlled.  He notes shortness of breath with activity baseline.  Denies chest pain or cough.  Denies difficulty swallowing, fevers, chills, abdominal pain, nausea, vomiting.  He states at  "baseline he uses oxygen as needed, but is vague in this regard.      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 Vasques      Aortic stenosis     Severe AS, 9/2015 AVR - [facility] TRIFECTA Bovine bioprosthesis 25MM TF-25A     Atrial fibrillation (H)     9/2015 Paroxysmal post op Afib - discharged on Warfarin and a beta blocker     COPD (chronic obstructive pulmonary disease) (H)      Deep vein thrombosis (H)      GERD (gastroesophageal reflux disease)      Heart murmur      Monoclonal gammopathy     plasmacyte prominent causing monoclonal gammopathy     Need for SBE (subacute bacterial endocarditis) prophylaxis      Neuropathy      Other and unspecified hyperlipidemia      Other malignant lymphomas     non hodgkin's lymphoma     RBBB (right bundle branch block)      Severe sepsis with acute organ dysfunction (H) 11/16/2015     Unspecified hereditary and idiopathic peripheral neuropathy       PAST SURGICAL HISTORY:   has a past surgical history that includes appendectomy; tonsillectomy; knee surgery; rotator cuff repair rt/lt; turp; hernia repair (2006); Spine surgery; Repair ligament ankle (2/23/2012); Herniorrhaphy ventral (4/17/2013); Replace valve aortic (N/A, 9/3/2015); Esophagoscopy, gastroscopy, duodenoscopy (EGD), combined (N/A, 11/28/2015); TOTAL KNEE ARTHROPLASTY; back surgery; Esophagoscopy, gastroscopy, duodenoscopy (EGD), combined (N/A, 7/26/2018); Thoracotomy, wedge resection lung, combined (Right, 3/26/2019); Lobectomy lung (Right, " 3/26/2019); Esophagoscopy, gastroscopy, duodenoscopy (EGD), combined (N/A, 8/17/2020); Esophagoscopy, gastroscopy, duodenoscopy (EGD), combined (N/A, 10/24/2020); Esophagoscopy, gastroscopy, duodenoscopy (EGD), combined (N/A, 4/11/2022); and Arthroplasty knee (Right, 7/25/2022).  FAMILY HISTORY: family history includes C.A.D. in his father; Emphysema in his father.  SOCIAL HISTORY:   reports that he quit smoking about 24 years ago. He has a 110.00 pack-year smoking history. He has never used smokeless tobacco. He reports current alcohol use. He reports that he does not use drugs.  Patient's living condition: lives with spouse    Current medications were reviewed by me.     Current Outpatient Medications   Medication Sig     ACE/ARB/ARNI NOT PRESCRIBED (INTENTIONAL) Please choose reason not prescribed from choices below. (Patient not taking: Reported on 8/1/2022)     acetaminophen (TYLENOL) 500 MG tablet Take 1,000 mg by mouth 3 times daily     albuterol (PROAIR HFA/PROVENTIL HFA/VENTOLIN HFA) 108 (90 Base) MCG/ACT inhaler Inhale 1-2 puffs into the lungs every 6 hours as needed     albuterol (PROVENTIL) (2.5 MG/3ML) 0.083% neb solution Take 1 vial (2.5 mg) by nebulization every 6 hours as needed for shortness of breath / dyspnea or wheezing     apixaban ANTICOAGULANT (ELIQUIS) 5 MG tablet Take 1 tablet (5 mg) by mouth 2 times daily     atorvastatin (LIPITOR) 10 MG tablet Take 1 tablet (10 mg) by mouth daily     cephALEXin (KEFLEX) 250 MG capsule Take 1 capsule (250 mg) by mouth 4 times daily     dimenhyDRINATE (DRAMAMINE) 50 MG tablet Take 50 mg by mouth as needed     DULoxetine (CYMBALTA) 60 MG capsule Take 60 mg by mouth daily     famotidine (PEPCID) 40 MG tablet Take 40 mg by mouth 2 times daily     ferrous sulfate (FE TABS) 325 (65 Fe) MG EC tablet Take 325 mg by mouth 2 times daily     fluticasone-vilanterol (BREO ELLIPTA) 200-25 MCG/INH inhaler Inhale 1 puff into the lungs daily     gabapentin (NEURONTIN) 300  "MG capsule Take 600 mg by mouth At Bedtime (2 x 300 mg = 600 mg)     metoprolol succinate ER (TOPROL XL) 25 MG 24 hr tablet Take 0.5 tablets (12.5 mg) by mouth 2 times daily ; Hold for SBP<100 or HR<55.     ondansetron (ZOFRAN ODT) 4 MG ODT tab Take 1 tablet (4 mg) by mouth every 8 hours as needed for nausea Dissolve ON the tongue.     polyethylene glycol (MIRALAX) 17 g packet Take 17 g by mouth daily     senna-docusate (SENOKOT-S/PERICOLACE) 8.6-50 MG tablet Take 1 tablet by mouth 2 times daily as needed for constipation Take while on oral narcotics to prevent or treat constipation.     torsemide (DEMADEX) 10 MG tablet Take 1 tablet (10 mg) by mouth daily ; Hold for SBP<100.     No current facility-administered medications for this visit.       ROS:  10 point ROS of systems including Constitutional, Eyes, Respiratory, Cardiovascular, Gastroenterology, Genitourinary, Integumentary, Musculoskeletal, Psychiatric were all negative except for pertinent positives noted in my HPI.    Vitals:  /67   Pulse 70   Temp 97.6  F (36.4  C)   Resp 16   Ht 1.778 m (5' 10\")   Wt 71.2 kg (157 lb)   SpO2 94%   BMI 22.53 kg/m    Exam:  Thin, appearing male, sitting up in bed, appears comfortable  HEENT missing multiple teeth  Oral mucosa moist  No eye redness or drainage  Neck no adenopathy  RESP:  RR 14, unlabored, not currently on 02  CV RRR no M  Abd soft, non-distended, no masses  Extremities: occlusive dressing R knee intact  2 + edema R calf and ankle  No calf tenderness  NEURO, alert, oriented to person, place, answers questions hesitantly  Face symmetric      Labs were reviewed by me.    No recent ABG in Epic  Most Recent 3 CBC's:Recent Labs   Lab Test 07/28/22  1643 07/28/22  0720 07/27/22  0719   WBC 12.9* 15.1* 13.1*   HGB 9.5* 9.9* 9.5*   MCV 89 88 92    151 140*     Most Recent 3 BMP's:Recent Labs   Lab Test 07/30/22  0604 07/29/22  0608 07/28/22  0720    140 139   POTASSIUM 3.5 4.0 3.8   CHLORIDE " 107 110* 111*   CO2 28 23 23   BUN 23 24 41*   CR 0.87 0.95 1.31*   ANIONGAP 3 7 5   AMRITA 8.8 8.6 8.5   * 109* 114*     Most Recent 2 LFT's:Recent Labs   Lab Test 04/11/22  0744 04/10/22  2034   AST 17 11   ALT 10 10   ALKPHOS 78 91   BILITOTAL 0.3 0.5       ASSESSMENT/PLAN:      (M17.11) Primary osteoarthritis of right knee  (primary encounter diagnosis)  (Z47.1,  Z96.651) Aftercare following right knee joint replacement surgery  (R53.81) Physical deconditioning  Comment: post op encephalopathy secondary to anesthesia, hypoxia secondary to atelectasis, RV overload  Plan: PT and OT, tylenol 1000mg TID scheduled minimize CNS active medications.  Continue PTA gabapentin (periphera neurropathy) Ortho f/u. Bowel regimen.  Chronic AC with apixaban      (D62) Anemia due to blood loss, acute  (D50.0) Iron deficiency anemia due to chronic blood loss (recent finding of erosive gastropathy, colonic diverticulosis and AVm but no active bleeding)  Comment: Hgb stable  Plan: monitor Hgb, continue ferrous sulfate   Continue famotidine   Outpatient f/u for chronic Fe deficiency     (J44.0) Chronic obstructive pulmonary disease   R sided heart failure, exacerbated by surgery  Post op hypoxia secondary to atelectasis, volume overload  Unclear need for prn 02 at baseline  Plan: monitor 02 sats, continue inhalers, continue newly started Torsemide  Monitor vitals, BMP     (I10) Essential hypertension with goal blood pressure less than 140/90  (I48.0) Paroxysmal atrial fib -- on Eliquis  (N18.30) Stage 3 chronic kidney disease, unspecified whether stage 3a or 3b CKD (H)  Comment: renal function stable during hospitalization  Plan: continue torsemide 10mg QD,  Metoprolol xl 12.5mg BID, apixaban 5mg BID, monitor vitals, BMP     (R41.89) Cognitive impairment  Comment: acute encephalopathy, apparently superimposed upon chronic cognitive impairment  BIMS 9/15  Plan monitor mental status, review with wife, assure safe  discharge       Charles You MD          Sincerely,        Charles You MD

## 2022-08-08 ENCOUNTER — TRANSITIONAL CARE UNIT VISIT (OUTPATIENT)
Dept: GERIATRICS | Facility: CLINIC | Age: 87
End: 2022-08-08
Payer: COMMERCIAL

## 2022-08-08 VITALS
HEART RATE: 86 BPM | DIASTOLIC BLOOD PRESSURE: 64 MMHG | OXYGEN SATURATION: 91 % | WEIGHT: 161 LBS | TEMPERATURE: 97.6 F | BODY MASS INDEX: 23.05 KG/M2 | SYSTOLIC BLOOD PRESSURE: 104 MMHG | HEIGHT: 70 IN | RESPIRATION RATE: 18 BRPM

## 2022-08-08 DIAGNOSIS — Z47.1 AFTERCARE FOLLOWING RIGHT KNEE JOINT REPLACEMENT SURGERY: ICD-10-CM

## 2022-08-08 DIAGNOSIS — I48.0 PAROXYSMAL ATRIAL FIBRILLATION (H): ICD-10-CM

## 2022-08-08 DIAGNOSIS — M17.11 PRIMARY OSTEOARTHRITIS OF RIGHT KNEE: ICD-10-CM

## 2022-08-08 DIAGNOSIS — D50.0 IRON DEFICIENCY ANEMIA DUE TO CHRONIC BLOOD LOSS: ICD-10-CM

## 2022-08-08 DIAGNOSIS — R53.81 PHYSICAL DECONDITIONING: ICD-10-CM

## 2022-08-08 DIAGNOSIS — D62 ANEMIA DUE TO BLOOD LOSS, ACUTE: ICD-10-CM

## 2022-08-08 DIAGNOSIS — I10 ESSENTIAL HYPERTENSION WITH GOAL BLOOD PRESSURE LESS THAN 140/90: ICD-10-CM

## 2022-08-08 DIAGNOSIS — N39.0 URINARY TRACT INFECTION WITHOUT HEMATURIA, SITE UNSPECIFIED: Primary | ICD-10-CM

## 2022-08-08 DIAGNOSIS — R41.89 COGNITIVE IMPAIRMENT: ICD-10-CM

## 2022-08-08 DIAGNOSIS — J44.0 CHRONIC OBSTRUCTIVE PULMONARY DISEASE WITH ACUTE LOWER RESPIRATORY INFECTION (H): ICD-10-CM

## 2022-08-08 DIAGNOSIS — N18.30 STAGE 3 CHRONIC KIDNEY DISEASE, UNSPECIFIED WHETHER STAGE 3A OR 3B CKD (H): ICD-10-CM

## 2022-08-08 DIAGNOSIS — Z96.651 AFTERCARE FOLLOWING RIGHT KNEE JOINT REPLACEMENT SURGERY: ICD-10-CM

## 2022-08-08 PROCEDURE — 99310 SBSQ NF CARE HIGH MDM 45: CPT | Performed by: NURSE PRACTITIONER

## 2022-08-08 RX ORDER — CIPROFLOXACIN 500 MG/1
500 TABLET, FILM COATED ORAL 2 TIMES DAILY
Qty: 14 TABLET | Refills: 0
Start: 2022-08-08 | End: 2022-08-15

## 2022-08-08 NOTE — LETTER
"    8/8/2022        RE: Hermilo Velasquez  [address] [facility] [address]        [facility]    Chief Complaint   Patient presents with     Nursing Home Acute     HPI:  Hermilo Velasquez is a 89 year old  (11/3/1932), who is being seen today for an episodic care visit at: [facility]) [25]. Today's concern is:   DJD right knee s/p right TKA: on exam today patient sitting up in w/c, states pain in right knee is controlled, very little at rest, states he is working with therapy, walking up to 220 feet using a RW with SBA  UTI: patient c/o dysuria, frequency and incontinence, UC shows > 100,000 gram negative bacilli, no c/o fever or chills  Cognitive impairment: BIMS 9/15, SBT not tested  Anemia: Hgb 9.7 o 8/3/22  COPD: denies SOB, cough, congestion, SaO2 90-93% on room air  HTN/PAF/CKD: /64, 106/56, 114/62 with HR 80 range, denies CP, palpitations    Allergies, and PMH/PSH reviewed in [facility] today.  REVIEW OF SYSTEMS:  10 point ROS of systems including Constitutional, Eyes, Respiratory, Cardiovascular, Gastroenterology, Genitourinary, Integumentary, Musculoskeletal, Psychiatric were all negative except for pertinent positives noted in my HPI.    Objective:   /64   Pulse 86   Temp 97.6  F (36.4  C)   Resp 18   Ht 1.778 m (5' 10\")   Wt 73 kg (161 lb)   SpO2 91%   BMI 23.10 kg/m    GENERAL APPEARANCE:  Alert, in no distress  ENT:  Mouth and posterior oropharynx normal, moist mucous membranes, [place]  EYES:  EOM, conjunctivae, lids, pupils and irises normal, PERRL  RESP:  respiratory effort and palpation of chest normal, lungs clear to auscultation , no respiratory distress  CV:  Palpation and auscultation of heart done , regular rate and rhythm, no murmur, rub, or gallop, peripheral edema 1+ in RLE and trace LLE  ABDOMEN:  normal bowel sounds, soft, nontender, no hepatosplenomegaly or other masses  M/S:   patient sitting up in w/c  SKIN:  Inspection of skin and " subcutaneous tissue baseline, did not visualize surgical incision  NEURO:   speech wnl  PSYCH:  affect and mood normal    Recent labs in [facility] reviewed by me today.  and   Most Recent 3 CBC's:Recent Labs   Lab Test 07/28/22  1643 07/28/22  0720 07/27/22  0719   WBC 12.9* 15.1* 13.1*   HGB 9.5* 9.9* 9.5*   MCV 89 88 92    151 140*     Most Recent 3 BMP's:Recent Labs   Lab Test 07/30/22  0604 07/29/22  0608 07/28/22  0720    140 139   POTASSIUM 3.5 4.0 3.8   CHLORIDE 107 110* 111*   CO2 28 23 23   BUN 23 24 41*   CR 0.87 0.95 1.31*   ANIONGAP 3 7 5   AMRITA 8.8 8.6 8.5   * 109* 114*       Assessment/Plan:  (N39.0) UTI without hematuria  Acute/ongoing  >100,000 gram negative bacilli  Start cipro 500mg BID for 7 days    (M17.11) Primary osteoarthritis of right knee  (primary encounter diagnosis)  (Z47.1,  Z96.651) Aftercare following right knee joint replacement surgery  (R53.81) Physical deconditioning  Comment: acute/ongoing, no change  Plan: PT and OT, tylenol 1000mg TID scheduled , gabapentin 600mg qhs   F/u with ortho as directed     (D62) Anemia due to blood loss, acute  (D50.0) Iron deficiency anemia due to chronic blood loss  Comment: acute/ongoing, stable hgb 9.7 8/3/22  Plan: CBC follow, continue ferrous sulfate 325mg BID     (J44.0) Chronic obstructive pulmonary disease with acute lower respiratory infection (H)  Comment: ongoing no chagne  Plan: monitor SaO2 at rest and with activity, breo ellipta inhaler 1 puff QD, albuterol MDI 1-2 puffs q 6 hours prn,      (I10) Essential hypertension with goal blood pressure less than 140/90  (I48.0) Paroxysmal atrial fib -- on Eliquis  (N18.30) Stage 3 chronic kidney disease, unspecified whether stage 3a or 3b CKD (H)  Comment: ongoing, no change  Plan: BMP on 8/5/22, continue torsemide 10mg QD,  Metoprolol xl 12.5mg BID, apixaban 5mg BID     (R41.89) Cognitive impairment  Comment: ongoing, no change  Plan: [facility] 9/15,  to assist in  discharge planning          Orders:  cipro 500mg BID for 7 days      Electronically signed by: Tonya Lynn Haase, APRN CNP           Sincerely,        Tonya Lynn Haase, APRN CNP

## 2022-08-08 NOTE — PROGRESS NOTES
"[facility] GERIATRICS    Chief Complaint   Patient presents with     Nursing Home Acute     HPI:  Hermilo Velasquez is a 89 year old  (11/3/1932), who is being seen today for an episodic care visit at: [facility]) [25]. Today's concern is:   DJD right knee s/p right TKA: on exam today patient sitting up in w/c, states pain in right knee is controlled, very little at rest, states he is working with therapy, walking up to 220 feet using a RW with SBA  UTI: patient c/o dysuria, frequency and incontinence, UC shows > 100,000 gram negative bacilli, no c/o fever or chills  Cognitive impairment: BIMS 9/15, SBT not tested  Anemia: Hgb 9.7 o 8/3/22  COPD: denies SOB, cough, congestion, SaO2 90-93% on room air  HTN/PAF/CKD: /64, 106/56, 114/62 with HR 80 range, denies CP, palpitations    Allergies, and PMH/PSH reviewed in EPIC today.  REVIEW OF SYSTEMS:  10 point ROS of systems including Constitutional, Eyes, Respiratory, Cardiovascular, Gastroenterology, Genitourinary, Integumentary, Musculoskeletal, Psychiatric were all negative except for pertinent positives noted in my HPI.    Objective:   /64   Pulse 86   Temp 97.6  F (36.4  C)   Resp 18   Ht 1.778 m (5' 10\")   Wt 73 kg (161 lb)   SpO2 91%   BMI 23.10 kg/m    GENERAL APPEARANCE:  Alert, in no distress  ENT:  Mouth and posterior oropharynx normal, moist mucous membranes, [place]  EYES:  EOM, conjunctivae, lids, pupils and irises normal, PERRL  RESP:  respiratory effort and palpation of chest normal, lungs clear to auscultation , no respiratory distress  CV:  Palpation and auscultation of heart done , regular rate and rhythm, no murmur, rub, or gallop, peripheral edema 1+ in RLE and trace LLE  ABDOMEN:  normal bowel sounds, soft, nontender, no hepatosplenomegaly or other masses  M/S:   patient sitting up in w/c  SKIN:  Inspection of skin and subcutaneous tissue baseline, did not visualize surgical incision  NEURO:   speech wnl  PSYCH:  " affect and mood normal    Recent labs in [facility] reviewed by me today.  and   Most Recent 3 CBC's:Recent Labs   Lab Test 07/28/22  1643 07/28/22  0720 07/27/22  0719   WBC 12.9* 15.1* 13.1*   HGB 9.5* 9.9* 9.5*   MCV 89 88 92    151 140*     Most Recent 3 BMP's:Recent Labs   Lab Test 07/30/22  0604 07/29/22  0608 07/28/22  0720    140 139   POTASSIUM 3.5 4.0 3.8   CHLORIDE 107 110* 111*   CO2 28 23 23   BUN 23 24 41*   CR 0.87 0.95 1.31*   ANIONGAP 3 7 5   AMRITA 8.8 8.6 8.5   * 109* 114*       Assessment/Plan:  (N39.0) UTI without hematuria  Acute/ongoing  >100,000 gram negative bacilli  Start cipro 500mg BID for 7 days    (M17.11) Primary osteoarthritis of right knee  (primary encounter diagnosis)  (Z47.1,  Z96.651) Aftercare following right knee joint replacement surgery  (R53.81) Physical deconditioning  Comment: acute/ongoing, no change  Plan: PT and OT, tylenol 1000mg TID scheduled , gabapentin 600mg qhs   F/u with ortho as directed     (D62) Anemia due to blood loss, acute  (D50.0) Iron deficiency anemia due to chronic blood loss  Comment: acute/ongoing, stable hgb 9.7 8/3/22  Plan: CBC follow, continue ferrous sulfate 325mg BID     (J44.0) Chronic obstructive pulmonary disease with acute lower respiratory infection (H)  Comment: ongoing no chagne  Plan: monitor SaO2 at rest and with activity, breo ellipta inhaler 1 puff QD, albuterol MDI 1-2 puffs q 6 hours prn,      (I10) Essential hypertension with goal blood pressure less than 140/90  (I48.0) Paroxysmal atrial fib -- on Eliquis  (N18.30) Stage 3 chronic kidney disease, unspecified whether stage 3a or 3b CKD (H)  Comment: ongoing, no change  Plan: BMP on 8/5/22, continue torsemide 10mg QD,  Metoprolol xl 12.5mg BID, apixaban 5mg BID     (R41.89) Cognitive impairment  Comment: ongoing, no change  Plan: BIMS 9/15,  to assist in discharge planning          Orders:  cipro 500mg BID for 7 days      Electronically signed by: Freida  Lynn Haase, APRN CNP

## 2022-08-09 ENCOUNTER — TRANSFERRED RECORDS (OUTPATIENT)
Dept: HEALTH INFORMATION MANAGEMENT | Facility: CLINIC | Age: 87
End: 2022-08-09

## 2022-08-11 ENCOUNTER — TRANSITIONAL CARE UNIT VISIT (OUTPATIENT)
Dept: GERIATRICS | Facility: CLINIC | Age: 87
End: 2022-08-11
Payer: COMMERCIAL

## 2022-08-11 VITALS
BODY MASS INDEX: 23.41 KG/M2 | HEIGHT: 70 IN | SYSTOLIC BLOOD PRESSURE: 137 MMHG | RESPIRATION RATE: 16 BRPM | WEIGHT: 163.5 LBS | HEART RATE: 69 BPM | OXYGEN SATURATION: 91 % | DIASTOLIC BLOOD PRESSURE: 70 MMHG | TEMPERATURE: 98.9 F

## 2022-08-11 DIAGNOSIS — D50.0 IRON DEFICIENCY ANEMIA DUE TO CHRONIC BLOOD LOSS: ICD-10-CM

## 2022-08-11 DIAGNOSIS — R41.89 COGNITIVE IMPAIRMENT: ICD-10-CM

## 2022-08-11 DIAGNOSIS — I10 ESSENTIAL HYPERTENSION WITH GOAL BLOOD PRESSURE LESS THAN 140/90: ICD-10-CM

## 2022-08-11 DIAGNOSIS — M17.11 PRIMARY OSTEOARTHRITIS OF RIGHT KNEE: ICD-10-CM

## 2022-08-11 DIAGNOSIS — N39.0 URINARY TRACT INFECTION WITHOUT HEMATURIA, SITE UNSPECIFIED: Primary | ICD-10-CM

## 2022-08-11 DIAGNOSIS — R53.81 PHYSICAL DECONDITIONING: ICD-10-CM

## 2022-08-11 DIAGNOSIS — D62 ANEMIA DUE TO BLOOD LOSS, ACUTE: ICD-10-CM

## 2022-08-11 DIAGNOSIS — N18.30 STAGE 3 CHRONIC KIDNEY DISEASE, UNSPECIFIED WHETHER STAGE 3A OR 3B CKD (H): ICD-10-CM

## 2022-08-11 DIAGNOSIS — I48.0 PAROXYSMAL ATRIAL FIBRILLATION (H): ICD-10-CM

## 2022-08-11 DIAGNOSIS — Z96.651 AFTERCARE FOLLOWING RIGHT KNEE JOINT REPLACEMENT SURGERY: ICD-10-CM

## 2022-08-11 DIAGNOSIS — J44.0 CHRONIC OBSTRUCTIVE PULMONARY DISEASE WITH ACUTE LOWER RESPIRATORY INFECTION (H): ICD-10-CM

## 2022-08-11 DIAGNOSIS — Z47.1 AFTERCARE FOLLOWING RIGHT KNEE JOINT REPLACEMENT SURGERY: ICD-10-CM

## 2022-08-11 PROCEDURE — 99310 SBSQ NF CARE HIGH MDM 45: CPT | Performed by: NURSE PRACTITIONER

## 2022-08-11 NOTE — PROGRESS NOTES
"[facility] GERIATRICS    Chief Complaint   Patient presents with     Nursing Home Acute     HPI:  Hermilo Velasquez is a 89 year old  (11/3/1932), who is being seen today for an episodic care visit at: [facility]) [25]. Today's concern is:   DJD right knee s/p right TKA: on exam today patient sitting up in w/c, states pain in right knee is ongoing but controlled, patient  is working with therapy, walking up to 220 feet using a RW with SBA and able to navigate 12 stairs with SBA  UTI: patient denies dysuria, frequency, fever or chills, continues on cipro for UTI  Cognitive impairment: BIMS 9/15, SBT not tested, planning on discharge on Saturday with OP PT  Anemia: Hgb 9.7 o 8/3/22  COPD: denies SOB, cough, congestion, SaO2 90-92% on room air  HTN/PAF/CKD: /59, 137/70, 127/67  with HR labile range of 60-80, denies CP, palpitations       Allergies, and PMH/PSH reviewed in [facility] today.  REVIEW OF SYSTEMS:  10 point ROS of systems including Constitutional, Eyes, Respiratory, Cardiovascular, Gastroenterology, Genitourinary, Integumentary, Musculoskeletal, Psychiatric were all negative except for pertinent positives noted in my HPI.    Objective:   /70   Pulse 69   Temp 98.9  F (37.2  C)   Resp 16   Ht 1.778 m (5' 10\")   Wt 74.2 kg (163 lb 8 oz)   SpO2 91%   BMI 23.46 kg/m    GENERAL APPEARANCE:  Alert, in no distress  ENT:  Mouth and posterior oropharynx normal, moist mucous membranes, [place]  EYES:  EOM, conjunctivae, lids, pupils and irises normal, PERRL  RESP:  respiratory effort and palpation of chest normal, lungs clear to auscultation , no respiratory distress  CV:  Palpation and auscultation of heart done , regular rate and rhythm, no murmur, rub, or gallop, peripheral edema trace+ in LE bilaterally   ABDOMEN:  normal bowel sounds, soft, nontender, no hepatosplenomegaly or other masses  M/S:   patient sitting up in w/c  SKIN:  Inspection of skin and subcutaneous tissue baseline, " right knee with dressing intact  NEURO:   speech wnl  PSYCH:  affect and mood normal    Recent labs in [facility] reviewed by me today.  and   Most Recent 3 CBC's:Recent Labs   Lab Test 07/28/22  1643 07/28/22  0720 07/27/22  0719   WBC 12.9* 15.1* 13.1*   HGB 9.5* 9.9* 9.5*   MCV 89 88 92    151 140*     Most Recent 3 BMP's:Recent Labs   Lab Test 07/30/22  0604 07/29/22  0608 07/28/22  0720    140 139   POTASSIUM 3.5 4.0 3.8   CHLORIDE 107 110* 111*   CO2 28 23 23   BUN 23 24 41*   CR 0.87 0.95 1.31*   ANIONGAP 3 7 5   AMRITA 8.8 8.6 8.5   * 109* 114*       Assessment/Plan:  N39.0) UTI without hematuria  Acute/ongoing  >100,000 gram negative bacilli serratia marcescens sensitive to cipro  cotninue cipro 500mg BID for 7 days     (M17.11) Primary osteoarthritis of right knee  (primary encounter diagnosis)  (Z47.1,  Z96.651) Aftercare following right knee joint replacement surgery  (R53.81) Physical deconditioning  Comment: acute/ongoing, no change  Plan: PT and OT, tylenol 1000mg TID scheduled , gabapentin 600mg qhs   F/u with ortho as directed  Planning on discharge on Saturday     (D62) Anemia due to blood loss, acute  (D50.0) Iron deficiency anemia due to chronic blood loss  Comment: acute/ongoing, stable hgb 9.7 8/3/22 no change  Plan: CBC follow, continue ferrous sulfate 325mg BID     (J44.0) Chronic obstructive pulmonary disease with acute lower respiratory infection (H)  Comment: ongoing no change  Plan: monitor SaO2 at rest and with activity, breo ellipta inhaler 1 puff QD, albuterol MDI 1-2 puffs q 6 hours prn,      (I10) Essential hypertension with goal blood pressure less than 140/90  (I48.0) Paroxysmal atrial fib -- on Eliquis  (N18.30) Stage 3 chronic kidney disease, unspecified whether stage 3a or 3b CKD (H)  Comment: ongoing, no change  Plan: BMP on 8/5/22, continue torsemide 10mg QD,  Metoprolol xl 12.5mg BID, apixaban 5mg BID     (R41.89) Cognitive impairment  Comment: ongoing, no  change  Plan: ArnettS 9/15,  to assist in discharge planning       Orders:  No new orders      Electronically signed by: Tonya Lynn Haase, APRN CNP

## 2022-08-11 NOTE — LETTER
"    8/11/2022        RE: Hermilo Velasquez  [address] [facility] [address]        [place]    Chief Complaint   Patient presents with     Nursing Home Acute     HPI:  Hermilo Velasquez is a 89 year old  (11/3/1932), who is being seen today for an episodic care visit at: [facility]) [25]. Today's concern is:   DJD right knee s/p right TKA: on exam today patient sitting up in w/c, states pain in right knee is ongoing but controlled, patient  is working with therapy, walking up to 220 feet using a RW with SBA and able to navigate 12 stairs with SBA  UTI: patient denies dysuria, frequency, fever or chills, continues on cipro for UTI  Cognitive impairment: BIMS 9/15, SBT not tested, planning on discharge on Saturday with OP PT  Anemia: Hgb 9.7 o 8/3/22  COPD: denies SOB, cough, congestion, SaO2 90-92% on room air  HTN/PAF/CKD: /59, 137/70, 127/67  with HR labile range of 60-80, denies CP, palpitations       Allergies, and PMH/PSH reviewed in [facility] today.  REVIEW OF SYSTEMS:  10 point ROS of systems including Constitutional, Eyes, Respiratory, Cardiovascular, Gastroenterology, Genitourinary, Integumentary, Musculoskeletal, Psychiatric were all negative except for pertinent positives noted in my HPI.    Objective:   /70   Pulse 69   Temp 98.9  F (37.2  C)   Resp 16   Ht 1.778 m (5' 10\")   Wt 74.2 kg (163 lb 8 oz)   SpO2 91%   BMI 23.46 kg/m    GENERAL APPEARANCE:  Alert, in no distress  ENT:  Mouth and posterior oropharynx normal, moist mucous membranes, [place]  EYES:  EOM, conjunctivae, lids, pupils and irises normal, PERRL  RESP:  respiratory effort and palpation of chest normal, lungs clear to auscultation , no respiratory distress  CV:  Palpation and auscultation of heart done , regular rate and rhythm, no murmur, rub, or gallop, peripheral edema trace+ in LE bilaterally   ABDOMEN:  normal bowel sounds, soft, nontender, no hepatosplenomegaly or other " masses  M/S:   patient sitting up in w/c  SKIN:  Inspection of skin and subcutaneous tissue baseline, right knee with dressing intact  NEURO:   speech wnl  PSYCH:  affect and mood normal    Recent labs in [facility] reviewed by me today.  and   Most Recent 3 CBC's:Recent Labs   Lab Test 07/28/22  1643 07/28/22  0720 07/27/22  0719   WBC 12.9* 15.1* 13.1*   HGB 9.5* 9.9* 9.5*   MCV 89 88 92    151 140*     Most Recent 3 BMP's:Recent Labs   Lab Test 07/30/22  0604 07/29/22  0608 07/28/22  0720    140 139   POTASSIUM 3.5 4.0 3.8   CHLORIDE 107 110* 111*   CO2 28 23 23   BUN 23 24 41*   CR 0.87 0.95 1.31*   ANIONGAP 3 7 5   AMRITA 8.8 8.6 8.5   * 109* 114*       Assessment/Plan:  N39.0) UTI without hematuria  Acute/ongoing  >100,000 gram negative bacilli serratia marcescens sensitive to cipro  cotninue cipro 500mg BID for 7 days     (M17.11) Primary osteoarthritis of right knee  (primary encounter diagnosis)  (Z47.1,  Z96.651) Aftercare following right knee joint replacement surgery  (R53.81) Physical deconditioning  Comment: acute/ongoing, no change  Plan: PT and OT, tylenol 1000mg TID scheduled , gabapentin 600mg qhs   F/u with ortho as directed  Planning on discharge on Saturday     (D62) Anemia due to blood loss, acute  (D50.0) Iron deficiency anemia due to chronic blood loss  Comment: acute/ongoing, stable hgb 9.7 8/3/22 no change  Plan: CBC follow, continue ferrous sulfate 325mg BID     (J44.0) Chronic obstructive pulmonary disease with acute lower respiratory infection (H)  Comment: ongoing no change  Plan: monitor SaO2 at rest and with activity, breo ellipta inhaler 1 puff QD, albuterol MDI 1-2 puffs q 6 hours prn,      (I10) Essential hypertension with goal blood pressure less than 140/90  (I48.0) Paroxysmal atrial fib -- on Eliquis  (N18.30) Stage 3 chronic kidney disease, unspecified whether stage 3a or 3b CKD (H)  Comment: ongoing, no change  Plan: BMP on 8/5/22, continue torsemide 10mg QD,   Metoprolol xl 12.5mg BID, apixaban 5mg BID     (R41.89) Cognitive impairment  Comment: ongoing, no change  Plan: HeberS 9/15,  to assist in discharge planning       Orders:  No new orders      Electronically signed by: Tonya Lynn Haase, APRN CNP             Sincerely,        Tonya Lynn Haase, APRN CNP

## 2022-08-12 ENCOUNTER — DISCHARGE SUMMARY NURSING HOME (OUTPATIENT)
Dept: GERIATRICS | Facility: CLINIC | Age: 87
End: 2022-08-12
Payer: COMMERCIAL

## 2022-08-12 VITALS
DIASTOLIC BLOOD PRESSURE: 55 MMHG | RESPIRATION RATE: 16 BRPM | HEART RATE: 67 BPM | OXYGEN SATURATION: 96 % | HEIGHT: 70 IN | SYSTOLIC BLOOD PRESSURE: 118 MMHG | BODY MASS INDEX: 23.81 KG/M2 | WEIGHT: 166.3 LBS | TEMPERATURE: 98.9 F

## 2022-08-12 DIAGNOSIS — D50.0 IRON DEFICIENCY ANEMIA DUE TO CHRONIC BLOOD LOSS: ICD-10-CM

## 2022-08-12 DIAGNOSIS — N18.30 STAGE 3 CHRONIC KIDNEY DISEASE, UNSPECIFIED WHETHER STAGE 3A OR 3B CKD (H): ICD-10-CM

## 2022-08-12 DIAGNOSIS — J44.0 CHRONIC OBSTRUCTIVE PULMONARY DISEASE WITH ACUTE LOWER RESPIRATORY INFECTION (H): ICD-10-CM

## 2022-08-12 DIAGNOSIS — Z47.1 AFTERCARE FOLLOWING RIGHT KNEE JOINT REPLACEMENT SURGERY: ICD-10-CM

## 2022-08-12 DIAGNOSIS — D62 ANEMIA DUE TO BLOOD LOSS, ACUTE: ICD-10-CM

## 2022-08-12 DIAGNOSIS — M17.11 PRIMARY OSTEOARTHRITIS OF RIGHT KNEE: ICD-10-CM

## 2022-08-12 DIAGNOSIS — I10 ESSENTIAL HYPERTENSION WITH GOAL BLOOD PRESSURE LESS THAN 140/90: ICD-10-CM

## 2022-08-12 DIAGNOSIS — N39.0 URINARY TRACT INFECTION WITHOUT HEMATURIA, SITE UNSPECIFIED: Primary | ICD-10-CM

## 2022-08-12 DIAGNOSIS — I48.0 PAROXYSMAL ATRIAL FIBRILLATION (H): ICD-10-CM

## 2022-08-12 DIAGNOSIS — R53.81 PHYSICAL DECONDITIONING: ICD-10-CM

## 2022-08-12 DIAGNOSIS — R41.89 COGNITIVE IMPAIRMENT: ICD-10-CM

## 2022-08-12 DIAGNOSIS — Z96.651 AFTERCARE FOLLOWING RIGHT KNEE JOINT REPLACEMENT SURGERY: ICD-10-CM

## 2022-08-12 PROCEDURE — 99316 NF DSCHRG MGMT 30 MIN+: CPT | Performed by: NURSE PRACTITIONER

## 2022-08-12 RX ORDER — SULFAMETHOXAZOLE/TRIMETHOPRIM 800-160 MG
1 TABLET ORAL 2 TIMES DAILY
Status: ON HOLD | COMMUNITY
End: 2022-08-24

## 2022-08-12 RX ORDER — NYSTATIN 100000 [USP'U]/G
POWDER TOPICAL
COMMUNITY
End: 2022-10-09

## 2022-08-12 NOTE — PROGRESS NOTES
[facility] GERIATRICS DISCHARGE SUMMARY  PATIENT'S NAME: Hermilo Velasquez  YOB: 1932  MEDICAL RECORD NUMBER:  [id number]  Place of Service where encounter took place:  [facility]) [25]    PRIMARY CARE PROVIDER AND CLINIC RESPONSIBLE AFTER TRANSFER:   Karson Bishop MD, [address] / [place] [address]    G Provider     Transferring providers: Tonya Lynn Haase, APRN CNP, Dr. Charles You MD  Recent Hospitalization/ED:  [facility] stay 7/25/22 to 7/31/22.  Date of SNF Admission: July 31, 2022  Date of SNF (anticipated) Discharge: August 13, 2022  Discharged to: previous independent home  Cognitive Scores: BIMS: 9/15/15  Physical Function: Ambulating > 200 feet ft with RW  DME: Walker    CODE STATUS/ADVANCE DIRECTIVES DISCUSSION:  Full Code   ALLERGIES: Lidocaine, Omeprazole, Pantoprazole, Prevacid [lansoprazole], Lasix [furosemide], and Penicillins    NURSING FACILITY COURSE   Medication Changes/Rationale:     See assessment and plan    Summary of nursing facility stay:   Patient progressed in therapy to walking > 200 feet using a RW independently, able to navigate 12 stairs, ROM in right knee is 100 degrees of flexion, patient independent with ADL's will DC to home with home PT and OT through [facility].    Discharge Medications:    Current Outpatient Medications   Medication Sig Dispense Refill     acetaminophen (TYLENOL) 500 MG tablet Take 1,000 mg by mouth 3 times daily       albuterol (PROAIR HFA/PROVENTIL HFA/VENTOLIN HFA) 108 (90 Base) MCG/ACT inhaler Inhale 2 puffs into the lungs every 6 hours as needed       albuterol (PROVENTIL) (2.5 MG/3ML) 0.083% neb solution Take 1 vial (2.5 mg) by nebulization every 6 hours as needed for shortness of breath / dyspnea or wheezing 180 mL 11     apixaban ANTICOAGULANT (ELIQUIS) 5 MG tablet Take 1 tablet (5 mg) by mouth 2 times daily 60 tablet 3     atorvastatin (LIPITOR) 10 MG tablet Take 1  tablet (10 mg) by mouth daily 90 tablet 2     ciprofloxacin (CIPRO) 500 MG tablet Take 1 tablet (500 mg) by mouth 2 times daily for 7 days 14 tablet 0     dimenhyDRINATE (DRAMAMINE) 50 MG tablet Take 50 mg by mouth as needed       DULoxetine (CYMBALTA) 60 MG capsule Take 60 mg by mouth daily       famotidine (PEPCID) 40 MG tablet Take 40 mg by mouth 2 times daily       ferrous sulfate (FE TABS) 325 (65 Fe) MG EC tablet Take 325 mg by mouth 2 times daily       fluticasone-vilanterol (BREO ELLIPTA) 200-25 MCG/INH inhaler Inhale 1 puff into the lungs daily       gabapentin (NEURONTIN) 300 MG capsule Take 600 mg by mouth At Bedtime (2 x 300 mg = 600 mg)       metoprolol succinate ER (TOPROL XL) 25 MG 24 hr tablet Take 0.5 tablets (12.5 mg) by mouth 2 times daily ; Hold for SBP<100 or HR<55. 30 tablet 3     nystatin (MYCOSTATIN) 428060 UNIT/GM external powder Apply to groin/abdomen folds topically two times a day for  redness until healed then D/C       ondansetron (ZOFRAN ODT) 4 MG ODT tab Take 1 tablet (4 mg) by mouth every 8 hours as needed for nausea Dissolve ON the tongue. 10 tablet 0     polyethylene glycol (MIRALAX) 17 g packet Take 17 g by mouth daily 7 packet 0     senna-docusate (SENOKOT-S/PERICOLACE) 8.6-50 MG tablet Take 1 tablet by mouth 2 times daily as needed for constipation Take while on oral narcotics to prevent or treat constipation. 30 tablet 0     sulfamethoxazole-trimethoprim (BACTRIM DS) 800-160 MG tablet Take 1 tablet by mouth 2 times daily       torsemide (DEMADEX) 10 MG tablet Take 1 tablet (10 mg) by mouth daily ; Hold for SBP<100. 30 tablet 3     ACE/ARB/ARNI NOT PRESCRIBED (INTENTIONAL) Please choose reason not prescribed from choices below. 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 Vasques      Aortic stenosis     Severe AS, 9/2015 AVR - [facility] TRIFECTA Bovine bioprosthesis 25MM TF-25A     Atrial fibrillation (H)     9/2015 Paroxysmal post op Afib - discharged on Warfarin and a beta blocker     COPD (chronic obstructive pulmonary disease) (H)      Deep vein thrombosis (H)      GERD (gastroesophageal reflux disease)      Heart murmur      Monoclonal gammopathy     plasmacyte prominent causing monoclonal gammopathy     Need for SBE (subacute bacterial endocarditis) prophylaxis      Neuropathy      Other and unspecified hyperlipidemia      Other malignant lymphomas     non hodgkin's lymphoma     RBBB (right bundle branch block)      Severe sepsis with acute organ dysfunction (H) 11/16/2015     Unspecified hereditary and idiopathic peripheral neuropathy      Physical Exam:   Vitals: /55   Pulse 67   Temp 98.9  F (37.2  C)   Resp 16   Ht 1.778 m (5' 10\")   Wt 75.4 kg (166 lb 4.8 oz)   SpO2 96%   BMI 23.86 kg/m    BMI: Body mass index is 23.86 kg/m .  GENERAL APPEARANCE:  Alert, in no distress  ENT:  Mouth and posterior oropharynx normal, moist mucous membranes, [place]  EYES:  EOM, conjunctivae, lids, pupils and irises normal, PERRL  RESP:  respiratory effort and palpation of chest normal, lungs clear to auscultation , no respiratory distress  CV:  Palpation and auscultation of heart done , regular rate and rhythm, no murmur, rub, or gallop, peripheral edema trace+ in LE bilaterallyu  ABDOMEN:  normal bowel sounds, soft, nontender, no hepatosplenomegaly or other masses  M/S:   patient sitting up in w/c  SKIN:  Inspection of skin and subcutaneous tissue baseline, did not visualize surgical incision  NEURO:   speech wnl  PSYCH:  affect and mood " normal     SNF labs: Recent labs in [facility] reviewed by me today.  and   Most Recent 3 CBC's:Recent Labs   Lab Test 07/28/22  1643 07/28/22  0720 07/27/22  0719   WBC 12.9* 15.1* 13.1*   HGB 9.5* 9.9* 9.5*   MCV 89 88 92    151 140*     Most Recent 3 BMP's:Recent Labs   Lab Test 07/30/22  0604 07/29/22  0608 07/28/22  0720    140 139   POTASSIUM 3.5 4.0 3.8   CHLORIDE 107 110* 111*   CO2 28 23 23   BUN 23 24 41*   CR 0.87 0.95 1.31*   ANIONGAP 3 7 5   AMRITA 8.8 8.6 8.5   * 109* 114*     Assessment/Plan:  N39.0) UTI without hematuria  Acute/ongoing  >100,000 gram negative bacilli serratia marcescens sensitive to cipro  cotninue cipro 500mg BID for 7 days will finish 8/15/22     (M17.11) Primary osteoarthritis of right knee  (primary encounter diagnosis)  (Z47.1,  Z96.651) Aftercare following right knee joint replacement surgery  (R53.81) Physical deconditioning  Comment: stable  Plan: patient will DC to home with home PT and OT through [facility],continue tylenol 1000mg TID prn , gabapentin 600mg qhs   F/u with ortho as directed     (D62) Anemia due to blood loss, acute  (D50.0) Iron deficiency anemia due to chronic blood loss  Comment: acute/ongoing, stable hgb 9.7 8/3/22 no change  Plan: CBC follow, continue ferrous sulfate 325mg BID  F/u with PCP     (J44.0) Chronic obstructive pulmonary disease with acute lower respiratory infection (H)  Comment: ongoing no change  Plan: continue breo ellipta inhaler 1 puff QD, albuterol MDI 1-2 puffs q 6 hours prn,      (I10) Essential hypertension with goal blood pressure less than 140/90  (I48.0) Paroxysmal atrial fib -- on Eliquis  (N18.30) Stage 3 chronic kidney disease, unspecified whether stage 3a or 3b CKD (H)  Comment: ongoing  Plan: creat 1.64 on 8/3/22 continue torsemide 10mg QD,  Metoprolol xl 12.5mg BID, apixaban 5mg BID     (R41.89) Cognitive impairment  Comment: ongoing, no change  Plan: SunlandS 9/15       DISCHARGE PLAN:    Follow up labs: No labs  orders/due    Medical Follow Up:      Follow up with primary care provider in 1 weeks    [facility] scheduled appointments:     Future Appointments   Date Time Provider Department Center   8/15/2022  2:30 PM Karson Bishop MD [facility] CS   8/22/2022 11:00 AM [id number] [facility] CV[facility]G   8/26/2022  8:45 AM Tab Grijalva MD [facility]         Discharge Services: Home Care:  Occupational Therapy and Physical Therapy    Discharge Instructions Verbalized to Patient at Discharge:     None    TOTAL DISCHARGE TIME:   Greater than 30 minutes  Electronically signed by:  Tonya Lynn Haase, APRN CNP

## 2022-08-12 NOTE — LETTER
8/12/2022        RE: Hermilo Velasquez  [address]  [facility] [address]        [facility] GERIATRICS DISCHARGE SUMMARY  PATIENT'S NAME: Hermilo Velasquez  YOB: 1932  MEDICAL RECORD NUMBER:  [id number]  Place of Service where encounter took place:  [facility]) [25]    PRIMARY CARE PROVIDER AND CLINIC RESPONSIBLE AFTER TRANSFER:   Karson Bishop MD, [address] / [place] [address]    [facility] Provider     Transferring providers: Tonya Lynn Haase, APRN CNP, Dr. Charles You MD  Recent Hospitalization/ED:  [facility] Hospital stay 7/25/22 to 7/31/22.  Date of SNF Admission: July 31, 2022  Date of SNF (anticipated) Discharge: August 13, 2022  Discharged to: previous independent home  Cognitive Scores: BIMS: 9/15/15  Physical Function: Ambulating > 200 feet ft with RW  DME: Walker    CODE STATUS/ADVANCE DIRECTIVES DISCUSSION:  Full Code   ALLERGIES: Lidocaine, Omeprazole, Pantoprazole, Prevacid [lansoprazole], Lasix [furosemide], and Penicillins    NURSING FACILITY COURSE   Medication Changes/Rationale:     See assessment and plan    Summary of nursing facility stay:   Patient progressed in therapy to walking > 200 feet using a RW independently, able to navigate 12 stairs, ROM in right knee is 100 degrees of flexion, patient independent with ADL's will DC to home with home PT and OT through [facility].    Discharge Medications:    Current Outpatient Medications   Medication Sig Dispense Refill     acetaminophen (TYLENOL) 500 MG tablet Take 1,000 mg by mouth 3 times daily       albuterol (PROAIR HFA/PROVENTIL HFA/VENTOLIN HFA) 108 (90 Base) MCG/ACT inhaler Inhale 2 puffs into the lungs every 6 hours as needed       albuterol (PROVENTIL) (2.5 MG/3ML) 0.083% neb solution Take 1 vial (2.5 mg) by nebulization every 6 hours as needed for shortness of breath / dyspnea or wheezing 180 mL 11     apixaban ANTICOAGULANT (ELIQUIS) 5 MG tablet Take 1  tablet (5 mg) by mouth 2 times daily 60 tablet 3     atorvastatin (LIPITOR) 10 MG tablet Take 1 tablet (10 mg) by mouth daily 90 tablet 2     ciprofloxacin (CIPRO) 500 MG tablet Take 1 tablet (500 mg) by mouth 2 times daily for 7 days 14 tablet 0     dimenhyDRINATE (DRAMAMINE) 50 MG tablet Take 50 mg by mouth as needed       DULoxetine (CYMBALTA) 60 MG capsule Take 60 mg by mouth daily       famotidine (PEPCID) 40 MG tablet Take 40 mg by mouth 2 times daily       ferrous sulfate (FE TABS) 325 (65 Fe) MG EC tablet Take 325 mg by mouth 2 times daily       fluticasone-vilanterol (BREO ELLIPTA) 200-25 MCG/INH inhaler Inhale 1 puff into the lungs daily       gabapentin (NEURONTIN) 300 MG capsule Take 600 mg by mouth At Bedtime (2 x 300 mg = 600 mg)       metoprolol succinate ER (TOPROL XL) 25 MG 24 hr tablet Take 0.5 tablets (12.5 mg) by mouth 2 times daily ; Hold for SBP<100 or HR<55. 30 tablet 3     nystatin (MYCOSTATIN) 048363 UNIT/GM external powder Apply to groin/abdomen folds topically two times a day for  redness until healed then D/C       ondansetron (ZOFRAN ODT) 4 MG ODT tab Take 1 tablet (4 mg) by mouth every 8 hours as needed for nausea Dissolve ON the tongue. 10 tablet 0     polyethylene glycol (MIRALAX) 17 g packet Take 17 g by mouth daily 7 packet 0     senna-docusate (SENOKOT-S/PERICOLACE) 8.6-50 MG tablet Take 1 tablet by mouth 2 times daily as needed for constipation Take while on oral narcotics to prevent or treat constipation. 30 tablet 0     sulfamethoxazole-trimethoprim (BACTRIM DS) 800-160 MG tablet Take 1 tablet by mouth 2 times daily       torsemide (DEMADEX) 10 MG tablet Take 1 tablet (10 mg) by mouth daily ; Hold for SBP<100. 30 tablet 3     ACE/ARB/ARNI NOT PRESCRIBED (INTENTIONAL) Please choose reason not prescribed from choices below. 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 Vasques      Aortic stenosis     Severe AS, 9/2015 AVR - [facility] TRIFECTA Bovine bioprosthesis 25MM TF-25A     Atrial fibrillation (H)     9/2015 Paroxysmal post op Afib - discharged on Warfarin and a beta blocker     COPD (chronic obstructive pulmonary disease) (H)      Deep vein thrombosis (H)      GERD (gastroesophageal reflux disease)      Heart murmur      Monoclonal gammopathy     plasmacyte prominent causing monoclonal gammopathy     Need for SBE (subacute bacterial endocarditis) prophylaxis      Neuropathy      Other and unspecified hyperlipidemia      Other malignant lymphomas     non hodgkin's lymphoma     RBBB (right bundle branch block)      Severe sepsis with acute organ dysfunction (H) 11/16/2015     Unspecified hereditary and idiopathic peripheral neuropathy      Physical Exam:   Vitals: /55   Pulse 67   Temp 98.9  F (37.2  C)   Resp 16   Ht 1.778 m (5' 10\")   Wt 75.4 kg (166 lb 4.8 oz)   SpO2 96%   BMI 23.86 kg/m    BMI: Body mass index is 23.86 kg/m .  GENERAL APPEARANCE:  Alert, in no distress  ENT:  Mouth and posterior oropharynx normal, moist mucous membranes, [place]  EYES:  EOM, conjunctivae, lids, pupils and irises normal, PERRL  RESP:  respiratory effort and palpation of chest normal, lungs clear to auscultation , no respiratory distress  CV:  Palpation and auscultation of heart done , regular rate and rhythm, no murmur, rub, or gallop, peripheral edema trace+ in LE bilaterallyu  ABDOMEN:  normal bowel sounds, soft, nontender, no hepatosplenomegaly or other masses  M/S:   patient sitting up in w/c  SKIN:  Inspection of skin and subcutaneous tissue " baseline, did not visualize surgical incision  NEURO:   speech wnl  PSYCH:  affect and mood normal     SNF labs: Recent labs in EPIC reviewed by me today.  and   Most Recent 3 CBC's:Recent Labs   Lab Test 07/28/22  1643 07/28/22  0720 07/27/22  0719   WBC 12.9* 15.1* 13.1*   HGB 9.5* 9.9* 9.5*   MCV 89 88 92    151 140*     Most Recent 3 BMP's:Recent Labs   Lab Test 07/30/22  0604 07/29/22  0608 07/28/22  0720    140 139   POTASSIUM 3.5 4.0 3.8   CHLORIDE 107 110* 111*   CO2 28 23 23   BUN 23 24 41*   CR 0.87 0.95 1.31*   ANIONGAP 3 7 5   AMRITA 8.8 8.6 8.5   * 109* 114*     Assessment/Plan:  N39.0) UTI without hematuria  Acute/ongoing  >100,000 gram negative bacilli serratia marcescens sensitive to cipro  cotninue cipro 500mg BID for 7 days will finish 8/15/22     (M17.11) Primary osteoarthritis of right knee  (primary encounter diagnosis)  (Z47.1,  Z96.651) Aftercare following right knee joint replacement surgery  (R53.81) Physical deconditioning  Comment: stable  Plan: patient will DC to home with home PT and OT through [facility],continue tylenol 1000mg TID prn , gabapentin 600mg qhs   F/u with ortho as directed     (D62) Anemia due to blood loss, acute  (D50.0) Iron deficiency anemia due to chronic blood loss  Comment: acute/ongoing, stable hgb 9.7 8/3/22 no change  Plan: CBC follow, continue ferrous sulfate 325mg BID  F/u with PCP     (J44.0) Chronic obstructive pulmonary disease with acute lower respiratory infection (H)  Comment: ongoing no change  Plan: continue breo ellipta inhaler 1 puff QD, albuterol MDI 1-2 puffs q 6 hours prn,      (I10) Essential hypertension with goal blood pressure less than 140/90  (I48.0) Paroxysmal atrial fib -- on Eliquis  (N18.30) Stage 3 chronic kidney disease, unspecified whether stage 3a or 3b CKD (H)  Comment: ongoing  Plan: creat 1.64 on 8/3/22 continue torsemide 10mg QD,  Metoprolol xl 12.5mg BID, apixaban 5mg BID     (R41.89) Cognitive  impairment  Comment: ongoing, no change  Plan: BIMS 9/15       DISCHARGE PLAN:    Follow up labs: No labs orders/due    Medical Follow Up:      Follow up with primary care provider in 1 weeks    [facility] scheduled appointments:     Future Appointments   Date Time Provider Department Center   8/15/2022  2:30 PM Karson Bishop MD [facility] CS   8/22/2022 11:00 AM [id number] [facility] CVIMG   8/26/2022  8:45 AM Tab Grijalva MD [facility] CLIN         Discharge Services: Home Care:  Occupational Therapy and Physical Therapy    Discharge Instructions Verbalized to Patient at Discharge:     None    TOTAL DISCHARGE TIME:   Greater than 30 minutes  Electronically signed by:  Tonya Lynn Haase, APRN CNP                 Sincerely,        Tonya Lynn Haase, APRN CNP

## 2022-08-15 ENCOUNTER — VIRTUAL VISIT (OUTPATIENT)
Dept: FAMILY MEDICINE | Facility: CLINIC | Age: 87
End: 2022-08-15
Payer: COMMERCIAL

## 2022-08-15 VITALS — BODY MASS INDEX: 24.11 KG/M2 | DIASTOLIC BLOOD PRESSURE: 99 MMHG | WEIGHT: 168 LBS | SYSTOLIC BLOOD PRESSURE: 123 MMHG

## 2022-08-15 DIAGNOSIS — Z96.651 TOTAL KNEE REPLACEMENT STATUS, RIGHT: ICD-10-CM

## 2022-08-15 DIAGNOSIS — R32 URINARY INCONTINENCE, UNSPECIFIED TYPE: Primary | ICD-10-CM

## 2022-08-15 PROCEDURE — 99442 PR PHYSICIAN TELEPHONE EVALUATION 11-20 MIN: CPT | Mod: 95 | Performed by: INTERNAL MEDICINE

## 2022-08-15 NOTE — PROGRESS NOTES
Hermilo is a 89 year old who is being evaluated via a billable telephone visit.      What phone number would you like to be contacted at?  [phone]  How would you like to obtain your AVS? MyChart    Assessment & Plan     Urinary incontinence, unspecified type    - UA Macro with Reflex to Micro and Culture - lab collect; Future    Total knee replacement status, right      To hard to evaluate anything by phone given cognitive and hearing problems  Schedule in person appointment this week and get UA prior to appointment         22 minutes spent on the date of the encounter doing chart review, history and exam, documentation and further activities per the note           Return in about 1 week (around 8/22/2022) for F2F OV this wk w Edna after he submits a UA lab appt for that (OK to use same day or virtual slot).  Patient instructed to return to clinic or contact us sooner if symptoms worsen or new symptoms develop.     Karson Bishop MD  [facility] [place]    Subjective   Hermilo is a 89 year old accompanied by his spouse, presenting for the following health issues:  Hospital F/U    HPI     Hospital Follow-up Visit: Right leg swelling and urinary incontinence since surgery.    Hospital/Nursing Home/IP Rehab Facility: [facility]  Date of Admission: 7/25/2022 and 7/31/2022  Date of Discharge: 7/31/2022 and 8/13/2022  Reason(s) for Admission:   1. Status post right total knee arthroplasty 7/25/2022  2. Acute right heart failure   3. Acute hypoxic respiratory failure, suspect related to above  4. Type 2 non-ST-elevation myocardial infarction   5. Acute blood loss anemia   6. Acute kidney injury   7. Acute metabolic encephalopathy    8. Possible dementia        Was your hospitalization related to COVID-19? No   Problems taking medications regularly:  None  Medication changes since discharge: None  Problems adhering to non-medication therapy:  None    Summary of  hospitalization:  [facility] discharge summary reviewed  Diagnostic Tests/Treatments reviewed.  Follow up needed: orthopedic surgery   Other Healthcare Providers Involved in Patient s Care:         Homecare  Update since discharge: improved.   Post Medication Reconciliation Status:        Plan of care communicated with patient and family       Telephone call with wife present  Chapito has memory and hearing problems  Communication by phone is not easy  He seems to still be leaking urine  This is his main complaint  He had his knee replaced  He needed diuretics in the hospital for new right heart failure  He was also treated for UTI in TCU         Review of Systems         Objective           Vitals:  No vitals were obtained today due to virtual visit.    Physical Exam     He sounds comfortable, his memory seems a little worse than previous visits      Phone call duration: 15:20 minutes    .  ..

## 2022-08-16 ENCOUNTER — PATIENT OUTREACH (OUTPATIENT)
Dept: CARE COORDINATION | Facility: CLINIC | Age: 87
End: 2022-08-16

## 2022-08-16 ENCOUNTER — TELEPHONE (OUTPATIENT)
Dept: FAMILY MEDICINE | Facility: CLINIC | Age: 87
End: 2022-08-16

## 2022-08-16 NOTE — TELEPHONE ENCOUNTER
Left detailed voicemail for approval of delay start of care 8/19/22. Also to call  ([phone]) if there are any questions.     Walker Ennis RN  [facility]

## 2022-08-16 NOTE — TELEPHONE ENCOUNTER
Please call Teresita with verbal  Delay start of homecare to the 19th of Aug    [phone]  Ok to leave verbal

## 2022-08-16 NOTE — PROGRESS NOTES
Clinic Care Coordination Contact    Situation: Patient chart reviewed by care coordinator.    Background: Pt was admitted at [facility] 7/25 to 7/31 due to Status post right total knee arthroplasty 7/25/2022. Pt was at [facility] TCU and discharged on 8/13/2022.    Assessment:  SW will not notified of TCU discharge and have not contacted pt. Per chart review, pt had follow up appointment with PCP on 8/15/2022 and discussed discharge plan.    Plan/Recommendations: No outreaches will be made at this time unless a new referral is made or a change in the pt's status occurs.     Ana Johnston [facility]  Clinic Care Coordinator  [facility]  [phone]  [email or web address]

## 2022-08-16 NOTE — TELEPHONE ENCOUNTER
Writer called [facility] care coordinator [facility] to clarify why they are needing a delay start of care.     Patient Contact     Attempt #: 1     Was call answered?  No.  Left message on voicemail with information to call triage CECILIA Ennis RN  [facility]

## 2022-08-16 NOTE — TELEPHONE ENCOUNTER
Teresita with [facility] called back to states the delay in start of care is per pt request d/t scheduling conflict. Is provider OK with delay in start of care until the 19th?    Teresita: [phone], confidential     Radha ESTEBAN RN  [facility]

## 2022-08-17 ENCOUNTER — MEDICAL CORRESPONDENCE (OUTPATIENT)
Dept: HEALTH INFORMATION MANAGEMENT | Facility: CLINIC | Age: 87
End: 2022-08-17

## 2022-08-22 ENCOUNTER — HOSPITAL ENCOUNTER (OUTPATIENT)
Dept: CARDIOLOGY | Facility: CLINIC | Age: 87
Discharge: HOME | DRG: 377 | End: 2022-08-22
Attending: PHYSICIAN ASSISTANT | Admitting: PHYSICIAN ASSISTANT
Payer: COMMERCIAL

## 2022-08-22 DIAGNOSIS — I26.09 ACUTE COR PULMONALE (H): ICD-10-CM

## 2022-08-22 LAB — LVEF ECHO: NORMAL

## 2022-08-22 PROCEDURE — 93325 DOPPLER ECHO COLOR FLOW MAPG: CPT | Mod: 26 | Performed by: INTERNAL MEDICINE

## 2022-08-22 PROCEDURE — 93308 TTE F-UP OR LMTD: CPT

## 2022-08-22 PROCEDURE — 93306 TTE W/DOPPLER COMPLETE: CPT

## 2022-08-22 PROCEDURE — 93308 TTE F-UP OR LMTD: CPT | Mod: 26 | Performed by: INTERNAL MEDICINE

## 2022-08-22 PROCEDURE — 93325 DOPPLER ECHO COLOR FLOW MAPG: CPT

## 2022-08-22 PROCEDURE — 93321 DOPPLER ECHO F-UP/LMTD STD: CPT | Mod: 26 | Performed by: INTERNAL MEDICINE

## 2022-08-23 ENCOUNTER — OFFICE VISIT (OUTPATIENT)
Dept: FAMILY MEDICINE | Facility: CLINIC | Age: 87
End: 2022-08-23
Payer: COMMERCIAL

## 2022-08-23 ENCOUNTER — HOSPITAL ENCOUNTER (INPATIENT)
Facility: CLINIC | Age: 87
LOS: 16 days | Discharge: NURSING, REHAB OR LONG-TERM CARE | DRG: 377 | End: 2022-09-08
Attending: EMERGENCY MEDICINE | Admitting: INTERNAL MEDICINE
Payer: COMMERCIAL

## 2022-08-23 VITALS
RESPIRATION RATE: 16 BRPM | SYSTOLIC BLOOD PRESSURE: 93 MMHG | WEIGHT: 165.9 LBS | OXYGEN SATURATION: 94 % | TEMPERATURE: 97.7 F | BODY MASS INDEX: 23.75 KG/M2 | HEART RATE: 75 BPM | HEIGHT: 70 IN | DIASTOLIC BLOOD PRESSURE: 42 MMHG

## 2022-08-23 DIAGNOSIS — R30.0 DYSURIA: Primary | ICD-10-CM

## 2022-08-23 DIAGNOSIS — M17.11 PRIMARY LOCALIZED OSTEOARTHROSIS OF RIGHT LOWER LEG: ICD-10-CM

## 2022-08-23 DIAGNOSIS — D62 ANEMIA DUE TO BLOOD LOSS, ACUTE: ICD-10-CM

## 2022-08-23 DIAGNOSIS — D64.9 ANEMIA, UNSPECIFIED TYPE: ICD-10-CM

## 2022-08-23 DIAGNOSIS — N39.43 BENIGN PROSTATIC HYPERPLASIA WITH POST-VOID DRIBBLING: ICD-10-CM

## 2022-08-23 DIAGNOSIS — N18.30 CKD (CHRONIC KIDNEY DISEASE) STAGE 3, GFR 30-59 ML/MIN (H): ICD-10-CM

## 2022-08-23 DIAGNOSIS — R32 URINARY INCONTINENCE, UNSPECIFIED TYPE: ICD-10-CM

## 2022-08-23 DIAGNOSIS — M96.1 LUMBAR POST-LAMINECTOMY SYNDROME: Primary | ICD-10-CM

## 2022-08-23 DIAGNOSIS — K92.2 GASTROINTESTINAL HEMORRHAGE, UNSPECIFIED GASTROINTESTINAL HEMORRHAGE TYPE: ICD-10-CM

## 2022-08-23 DIAGNOSIS — N40.1 BENIGN PROSTATIC HYPERPLASIA WITH POST-VOID DRIBBLING: ICD-10-CM

## 2022-08-23 LAB
ABO/RH(D): NORMAL
ALBUMIN SERPL-MCNC: 2.6 G/DL (ref 3.4–5)
ALBUMIN UR-MCNC: NEGATIVE MG/DL
ALP SERPL-CCNC: 112 U/L (ref 40–150)
ALT SERPL W P-5'-P-CCNC: 22 U/L (ref 0–70)
ANION GAP SERPL CALCULATED.3IONS-SCNC: 6 MMOL/L (ref 3–14)
ANTIBODY SCREEN: NEGATIVE
APPEARANCE UR: CLEAR
AST SERPL W P-5'-P-CCNC: 24 U/L (ref 0–45)
ATRIAL RATE - MUSE: 72 BPM
BACTERIA #/AREA URNS HPF: ABNORMAL /HPF
BASOPHILS # BLD AUTO: 0 10E3/UL (ref 0–0.2)
BASOPHILS NFR BLD AUTO: 0 %
BILIRUB SERPL-MCNC: 0.2 MG/DL (ref 0.2–1.3)
BILIRUB UR QL STRIP: NEGATIVE
BLD PROD TYP BPU: NORMAL
BLD PROD TYP BPU: NORMAL
BLOOD COMPONENT TYPE: NORMAL
BLOOD COMPONENT TYPE: NORMAL
BUN SERPL-MCNC: 50 MG/DL (ref 7–30)
CALCIUM SERPL-MCNC: 8.5 MG/DL (ref 8.5–10.1)
CHLORIDE BLD-SCNC: 112 MMOL/L (ref 94–109)
CO2 SERPL-SCNC: 24 MMOL/L (ref 20–32)
CODING SYSTEM: NORMAL
CODING SYSTEM: NORMAL
COLOR UR AUTO: YELLOW
CREAT SERPL-MCNC: 2.35 MG/DL (ref 0.66–1.25)
CROSSMATCH: NORMAL
CROSSMATCH: NORMAL
DIASTOLIC BLOOD PRESSURE - MUSE: NORMAL MMHG
EOSINOPHIL # BLD AUTO: 1.7 10E3/UL (ref 0–0.7)
EOSINOPHIL NFR BLD AUTO: 20 %
ERYTHROCYTE [DISTWIDTH] IN BLOOD BY AUTOMATED COUNT: 19.4 % (ref 10–15)
GFR SERPL CREATININE-BSD FRML MDRD: 26 ML/MIN/1.73M2
GLUCOSE BLD-MCNC: 95 MG/DL (ref 70–99)
GLUCOSE UR STRIP-MCNC: NEGATIVE MG/DL
HCT VFR BLD AUTO: 18 % (ref 40–53)
HGB BLD-MCNC: 5.3 G/DL (ref 13.3–17.7)
HGB BLD-MCNC: 5.7 G/DL (ref 13.3–17.7)
HGB UR QL STRIP: ABNORMAL
IMM GRANULOCYTES # BLD: 0.1 10E3/UL
IMM GRANULOCYTES NFR BLD: 1 %
INR PPP: 1.65 (ref 0.85–1.15)
INTERPRETATION ECG - MUSE: NORMAL
ISSUE DATE AND TIME: NORMAL
ISSUE DATE AND TIME: NORMAL
KETONES UR STRIP-MCNC: NEGATIVE MG/DL
LEUKOCYTE ESTERASE UR QL STRIP: ABNORMAL
LYMPHOCYTES # BLD AUTO: 1 10E3/UL (ref 0.8–5.3)
LYMPHOCYTES NFR BLD AUTO: 11 %
MCH RBC QN AUTO: 28.6 PG (ref 26.5–33)
MCHC RBC AUTO-ENTMCNC: 29.4 G/DL (ref 31.5–36.5)
MCV RBC AUTO: 97 FL (ref 78–100)
MONOCYTES # BLD AUTO: 0.8 10E3/UL (ref 0–1.3)
MONOCYTES NFR BLD AUTO: 10 %
NEUTROPHILS # BLD AUTO: 5 10E3/UL (ref 1.6–8.3)
NEUTROPHILS NFR BLD AUTO: 58 %
NITRATE UR QL: NEGATIVE
NRBC # BLD AUTO: 0 10E3/UL
NRBC BLD AUTO-RTO: 0 /100
P AXIS - MUSE: 78 DEGREES
PH UR STRIP: 5.5 [PH] (ref 5–7)
PLATELET # BLD AUTO: 225 10E3/UL (ref 150–450)
POTASSIUM BLD-SCNC: 3.9 MMOL/L (ref 3.4–5.3)
PR INTERVAL - MUSE: 194 MS
PROT SERPL-MCNC: 6.1 G/DL (ref 6.8–8.8)
QRS DURATION - MUSE: 140 MS
QT - MUSE: 466 MS
QTC - MUSE: 510 MS
R AXIS - MUSE: 8 DEGREES
RBC # BLD AUTO: 1.85 10E6/UL (ref 4.4–5.9)
RBC #/AREA URNS AUTO: ABNORMAL /HPF
SARS-COV-2 RNA RESP QL NAA+PROBE: NEGATIVE
SODIUM SERPL-SCNC: 142 MMOL/L (ref 133–144)
SP GR UR STRIP: 1.01 (ref 1–1.03)
SPECIMEN EXPIRATION DATE: NORMAL
SQUAMOUS #/AREA URNS AUTO: ABNORMAL /LPF
SYSTOLIC BLOOD PRESSURE - MUSE: NORMAL MMHG
T AXIS - MUSE: 64 DEGREES
UNIT ABO/RH: NORMAL
UNIT ABO/RH: NORMAL
UNIT NUMBER: NORMAL
UNIT NUMBER: NORMAL
UNIT STATUS: NORMAL
UNIT STATUS: NORMAL
UNIT TYPE ISBT: 6200
UNIT TYPE ISBT: 6200
UROBILINOGEN UR STRIP-ACNC: 0.2 E.U./DL
VENTRICULAR RATE- MUSE: 72 BPM
WBC # BLD AUTO: 8.5 10E3/UL (ref 4–11)
WBC #/AREA URNS AUTO: ABNORMAL /HPF

## 2022-08-23 PROCEDURE — 36415 COLL VENOUS BLD VENIPUNCTURE: CPT | Performed by: INTERNAL MEDICINE

## 2022-08-23 PROCEDURE — 99223 1ST HOSP IP/OBS HIGH 75: CPT | Mod: AI | Performed by: INTERNAL MEDICINE

## 2022-08-23 PROCEDURE — 120N000001 HC R&B MED SURG/OB

## 2022-08-23 PROCEDURE — 99291 CRITICAL CARE FIRST HOUR: CPT

## 2022-08-23 PROCEDURE — 85018 HEMOGLOBIN: CPT | Performed by: EMERGENCY MEDICINE

## 2022-08-23 PROCEDURE — 81001 URINALYSIS AUTO W/SCOPE: CPT | Performed by: INTERNAL MEDICINE

## 2022-08-23 PROCEDURE — 86923 COMPATIBILITY TEST ELECTRIC: CPT | Performed by: HOSPITALIST

## 2022-08-23 PROCEDURE — 250N000011 HC RX IP 250 OP 636: Performed by: EMERGENCY MEDICINE

## 2022-08-23 PROCEDURE — C9113 INJ PANTOPRAZOLE SODIUM, VIA: HCPCS | Performed by: EMERGENCY MEDICINE

## 2022-08-23 PROCEDURE — 86923 COMPATIBILITY TEST ELECTRIC: CPT | Performed by: EMERGENCY MEDICINE

## 2022-08-23 PROCEDURE — U0003 INFECTIOUS AGENT DETECTION BY NUCLEIC ACID (DNA OR RNA); SEVERE ACUTE RESPIRATORY SYNDROME CORONAVIRUS 2 (SARS-COV-2) (CORONAVIRUS DISEASE [COVID-19]), AMPLIFIED PROBE TECHNIQUE, MAKING USE OF HIGH THROUGHPUT TECHNOLOGIES AS DESCRIBED BY CMS-2020-01-R: HCPCS | Performed by: EMERGENCY MEDICINE

## 2022-08-23 PROCEDURE — 82043 UR ALBUMIN QUANTITATIVE: CPT | Performed by: INTERNAL MEDICINE

## 2022-08-23 PROCEDURE — 99214 OFFICE O/P EST MOD 30 MIN: CPT | Performed by: INTERNAL MEDICINE

## 2022-08-23 PROCEDURE — C9803 HOPD COVID-19 SPEC COLLECT: HCPCS

## 2022-08-23 PROCEDURE — P9016 RBC LEUKOCYTES REDUCED: HCPCS | Performed by: EMERGENCY MEDICINE

## 2022-08-23 PROCEDURE — 86923 COMPATIBILITY TEST ELECTRIC: CPT | Performed by: INTERNAL MEDICINE

## 2022-08-23 PROCEDURE — 85610 PROTHROMBIN TIME: CPT | Performed by: EMERGENCY MEDICINE

## 2022-08-23 PROCEDURE — 36415 COLL VENOUS BLD VENIPUNCTURE: CPT | Performed by: EMERGENCY MEDICINE

## 2022-08-23 PROCEDURE — 80053 COMPREHEN METABOLIC PANEL: CPT | Performed by: EMERGENCY MEDICINE

## 2022-08-23 PROCEDURE — 36430 TRANSFUSION BLD/BLD COMPNT: CPT

## 2022-08-23 PROCEDURE — 82565 ASSAY OF CREATININE: CPT | Performed by: INTERNAL MEDICINE

## 2022-08-23 PROCEDURE — 99292 CRITICAL CARE ADDL 30 MIN: CPT

## 2022-08-23 PROCEDURE — 93005 ELECTROCARDIOGRAM TRACING: CPT

## 2022-08-23 PROCEDURE — 258N000003 HC RX IP 258 OP 636: Performed by: INTERNAL MEDICINE

## 2022-08-23 PROCEDURE — 86901 BLOOD TYPING SEROLOGIC RH(D): CPT | Performed by: EMERGENCY MEDICINE

## 2022-08-23 PROCEDURE — 84520 ASSAY OF UREA NITROGEN: CPT | Performed by: INTERNAL MEDICINE

## 2022-08-23 PROCEDURE — 86850 RBC ANTIBODY SCREEN: CPT | Performed by: EMERGENCY MEDICINE

## 2022-08-23 PROCEDURE — 85025 COMPLETE CBC W/AUTO DIFF WBC: CPT | Performed by: INTERNAL MEDICINE

## 2022-08-23 PROCEDURE — 96374 THER/PROPH/DIAG INJ IV PUSH: CPT

## 2022-08-23 RX ORDER — ONDANSETRON 4 MG/1
4 TABLET, ORALLY DISINTEGRATING ORAL EVERY 6 HOURS PRN
Status: DISCONTINUED | OUTPATIENT
Start: 2022-08-23 | End: 2022-09-08 | Stop reason: HOSPADM

## 2022-08-23 RX ORDER — ACETAMINOPHEN 650 MG/1
650 SUPPOSITORY RECTAL EVERY 6 HOURS PRN
Status: DISCONTINUED | OUTPATIENT
Start: 2022-08-23 | End: 2022-09-08 | Stop reason: HOSPADM

## 2022-08-23 RX ORDER — SODIUM CHLORIDE 9 MG/ML
INJECTION, SOLUTION INTRAVENOUS CONTINUOUS
Status: DISCONTINUED | OUTPATIENT
Start: 2022-08-23 | End: 2022-08-25

## 2022-08-23 RX ORDER — LIDOCAINE 40 MG/G
CREAM TOPICAL
Status: DISCONTINUED | OUTPATIENT
Start: 2022-08-23 | End: 2022-08-23

## 2022-08-23 RX ORDER — ALFUZOSIN HYDROCHLORIDE 10 MG/1
10 TABLET, EXTENDED RELEASE ORAL DAILY
Qty: 90 TABLET | Refills: 3 | Status: ON HOLD | OUTPATIENT
Start: 2022-08-23 | End: 2022-10-17

## 2022-08-23 RX ORDER — TRIAMCINOLONE ACETONIDE 1 MG/G
CREAM TOPICAL 2 TIMES DAILY PRN
COMMUNITY
End: 2022-10-09

## 2022-08-23 RX ORDER — TIOTROPIUM BROMIDE 18 UG/1
18 CAPSULE ORAL; RESPIRATORY (INHALATION) DAILY
Status: ON HOLD | COMMUNITY
End: 2024-01-01

## 2022-08-23 RX ORDER — DULOXETIN HYDROCHLORIDE 60 MG/1
CAPSULE, DELAYED RELEASE ORAL
COMMUNITY
End: 2022-10-09

## 2022-08-23 RX ORDER — TORSEMIDE 10 MG/1
10 TABLET ORAL DAILY
Status: ON HOLD | COMMUNITY
End: 2022-09-08

## 2022-08-23 RX ORDER — ACETAMINOPHEN 325 MG/1
650 TABLET ORAL EVERY 6 HOURS PRN
Status: DISCONTINUED | OUTPATIENT
Start: 2022-08-23 | End: 2022-09-08 | Stop reason: HOSPADM

## 2022-08-23 RX ORDER — ONDANSETRON 2 MG/ML
4 INJECTION INTRAMUSCULAR; INTRAVENOUS EVERY 6 HOURS PRN
Status: DISCONTINUED | OUTPATIENT
Start: 2022-08-23 | End: 2022-09-08 | Stop reason: HOSPADM

## 2022-08-23 RX ADMIN — SODIUM CHLORIDE: 9 INJECTION, SOLUTION INTRAVENOUS at 22:58

## 2022-08-23 RX ADMIN — PANTOPRAZOLE SODIUM 80 MG: 40 INJECTION, POWDER, FOR SOLUTION INTRAVENOUS at 17:30

## 2022-08-23 ASSESSMENT — PAIN SCALES - GENERAL: PAINLEVEL: SEVERE PAIN (7)

## 2022-08-23 ASSESSMENT — ACTIVITIES OF DAILY LIVING (ADL)
ADLS_ACUITY_SCORE: 33
ADLS_ACUITY_SCORE: 24
ADLS_ACUITY_SCORE: 35
ADLS_ACUITY_SCORE: 35

## 2022-08-23 ASSESSMENT — ENCOUNTER SYMPTOMS
HEMATURIA: 0
ABDOMINAL PAIN: 0
COUGH: 0
BLOOD IN STOOL: 0
LIGHT-HEADEDNESS: 0
DIZZINESS: 0

## 2022-08-23 NOTE — H&P
[facility]    History and Physical  Hospitalist       Date of Admission:  8/23/2022    Assessment & Plan   Hermilo Velasquez is a 89 year old male who presents with low hemoglobin on routine blood work.    Acute on chronic anemia  Suspected GI bleed  Known colonic AVMs  -Recent hemoglobin on 7/28/2022 after right total knee arthroplasty was 9.5  -Hemoglobin today was found to be low at 5.3  -No obvious history of GI bleed however patient has history of AVMs in his colon which was treated with APC on 4/12/2022 and is on chronic Eliquis  -Agree with 2 units of PRBC  -Serial hemoglobin every 6 hours  -Protonix 40 mg IV twice a day  -GI consult in the morning, challenging situation given known AVMs and potential risk of bleeding while on anticoagulation however patient also has a history of A. fib and recurrent PEs  -Hold Eliquis for now    S/p recent right total knee arthroplasty on 7/25/2022  -No active issues related to this, hemoglobin was stable between 9-10 postop    Chronic atrial fibrillation on Eliquis  Aortic stenosis s/p TAVR  -Hold Eliquis as mentioned above  -Blood pressure was on the softer side, would also hold beta-blocker at this time, resume as clinically indicated    COPD  -Not in exacerbation.  -Resume prior to admission albuterol inhaler and Breo Ellipta    Acute kidney injury on stage IIIa chronic kidney disease  -Last creatinine on 7/30/2022 was 0.87  -Baseline creatinine has been quite fluctuating between 1.9-1.6  -Presents with a creatinine of 2.35, likely prerenal from blood loss, was also borderline hypotensive initially.  Could also be related to recent Bactrim use(just completed course for UTI)  -Recheck creatinine with adequate hydration.    History of cognitive impairment  -This was of concern during last hospitalization, monitor closely, at risk for delirium    Recurrent pulmonary embolism  -Hold Eliquis for now until his source of anemia is clear and concern  for GI bleed is ruled out    Other chronic medical problems:  History of lung cancer s/p wedge resection in 2019  Stage IV non-Hodgkin's lymphoma in remission  MGUS  BPH    Hermilo is a LOW SUSPICION PUI.  Follow these instructions:    If COVID test positive -> continue isolation precautions    If COVID test negative -> discontinue COVID-specific isolation precautions     DVT Prophylaxis: Pneumatic Compression Devices  Code Status: Full Code    Disposition: Expected discharge in 2+ days    Carlos Kathleen MD, MD    Primary Care Physician   Karson Bishop    Chief Complaint   Abnormal labs    History is obtained from the patient and wife Roberta on the phone    History of Present Illness   Hermilo Velasquez is a 89 year old male who presents with low hemoglobin.  Patient was seen this morning at his primary care provider's clinic and had lab work done and was found to have low hemoglobin of 5.8 and was recommended to go to the emergency room.  On questioning further he denies lightheadedness or dizziness.  No report of bright red bleeding or black tarry stool, melena or hematochezia.  No hematuria or hemoptysis.  Patient underwent right knee surgery 3 weeks ago and his hemoglobin at the time of discharge was stable around 9.5.  Patient is chronically on Eliquis and has a history of colonic AVM that was found during colonoscopy in April, 2022.  As far as other pertinent review of system is concerned, patient denies chest pain. He does have some chronic dyspnea which is at baseline.  No nausea, vomiting or diarrhea.  Patient has had issues with dysuria and incontinence recently, was recently treated for UTI with a course of Bactrim.  In the emergency room he was found to have a hemoglobin of 5.3, was started on 2 units of packed red blood cell transfusion.  He has been admitted for further evaluation of his anemia.      Past Medical History    I have reviewed this patient's medical history and updated it with pertinent  information if needed.   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 Vasques      Aortic stenosis     Severe AS, 9/2015 AVR - [facility] TRIFECTA Bovine bioprosthesis 25MM TF-25A     Atrial fibrillation (H)     9/2015 Paroxysmal post op Afib - discharged on Warfarin and a beta blocker     COPD (chronic obstructive pulmonary disease) (H)      Deep vein thrombosis (H)      GERD (gastroesophageal reflux disease)      Heart murmur      Monoclonal gammopathy     plasmacyte prominent causing monoclonal gammopathy     Need for SBE (subacute bacterial endocarditis) prophylaxis      Neuropathy      Other and unspecified hyperlipidemia      Other malignant lymphomas     non hodgkin's lymphoma     RBBB (right bundle branch block)      Severe sepsis with acute organ dysfunction (H) 11/16/2015     Unspecified hereditary and idiopathic peripheral neuropathy        Past Surgical History   I have reviewed this patient's surgical history and updated it with pertinent information if needed.  Past Surgical History:   Procedure Laterality Date     APPENDECTOMY       ARTHROPLASTY KNEE Right 7/25/2022    Procedure: RIGHT TOTAL KNEE ARTHROPLASTY;  Surgeon: Giuliano Deshpande MD;  Location:  OR     AS TOTAL KNEE ARTHROPLASTY       BACK SURGERY       ESOPHAGOSCOPY, GASTROSCOPY, DUODENOSCOPY (EGD), COMBINED N/A 11/28/2015    Procedure: COMBINED ESOPHAGOSCOPY, GASTROSCOPY, DUODENOSCOPY (EGD);  Surgeon: Danis Castillo MD;  Location:  GI     ESOPHAGOSCOPY, GASTROSCOPY, DUODENOSCOPY (EGD), COMBINED N/A 7/26/2018    Procedure: COMBINED ESOPHAGOSCOPY, GASTROSCOPY, DUODENOSCOPY (EGD);;  Surgeon: Toby Dong DO;  Location:  GI     ESOPHAGOSCOPY, GASTROSCOPY, DUODENOSCOPY (EGD), COMBINED N/A 8/17/2020    Procedure: ESOPHAGOGASTRODUODENOSCOPY (EGD);  Surgeon: Herrera Henry MD;  Location:  GI     ESOPHAGOSCOPY, GASTROSCOPY, DUODENOSCOPY (EGD), COMBINED  N/A 10/24/2020    Procedure: ESOPHAGOGASTRODUODENOSCOPY (EGD);  Surgeon: Vick Florentino MD;  Location:  GI     ESOPHAGOSCOPY, GASTROSCOPY, DUODENOSCOPY (EGD), COMBINED N/A 4/11/2022    Procedure: ESOPHAGOGASTRODUODENOSCOPY (EGD);  Surgeon: Vick Florentino MD;  Location:  GI     HERNIA REPAIR  2006     HERNIORRHAPHY VENTRAL  4/17/2013    Procedure: HERNIORRHAPHY VENTRAL;  VENTRAL HERNIA REPAIR WITH MESH;  Surgeon: Patel Guzman MD;  Location:  OR     KNEE SURGERY      arthroscopic right knee surgery      LOBECTOMY LUNG Right 3/26/2019    POSSIBLE LOBECTOMY LUNG per Dr. Vasques at [facility]     REPAIR LIGAMENT ANKLE  2/23/2012    Procedure:REPAIR LIGAMENT ANKLE; LEFT TARSAL TUNNEL RELEASE OF KNOT OF ARIEL RELEASE; Surgeon:SAUL PUENTE; Location: OR     REPLACE VALVE AORTIC N/A 9/3/2015    Procedure: REPLACE VALVE AORTIC;  Surgeon: Antonino Mitchell MD;  Location:  OR     ROTATOR CUFF REPAIR RT/LT      bilateral     SPINE SURGERY      3 spine surgeries     THORACOTOMY, WEDGE RESECTION LUNG, COMBINED Right 3/26/2019    RIGHT THORACOTOMY, WEDGE RESECTION, RIGHT LOWER LOBE LUNG NODULE,;  Surgeon: Jose A Vasques MD;  Location:  OR     TONSILLECTOMY       TURP         Prior to Admission Medications   Prior to Admission Medications   Prescriptions Last Dose Informant Patient Reported? Taking?   ACE/ARB/ARNI NOT PRESCRIBED (INTENTIONAL)   No No   Sig: Please choose reason not prescribed from choices below.   Patient not taking: Reported on 8/23/2022   acetaminophen (TYLENOL) 500 MG tablet   Yes No   Sig: Take 1,000 mg by mouth 3 times daily as needed   albuterol (PROAIR HFA/PROVENTIL HFA/VENTOLIN HFA) 108 (90 Base) MCG/ACT inhaler  Self Yes No   Sig: Inhale 2 puffs into the lungs every 6 hours as needed   albuterol (PROVENTIL) (2.5 MG/3ML) 0.083% neb solution  Self No No   Sig: Take 1 vial (2.5 mg) by nebulization every 6 hours as needed for shortness of breath /  dyspnea or wheezing   alfuzosin ER (UROXATRAL) 10 MG 24 hr tablet   No No   Sig: Take 1 tablet (10 mg) by mouth daily   apixaban ANTICOAGULANT (ELIQUIS) 5 MG tablet   No No   Sig: Take 1 tablet (5 mg) by mouth 2 times daily   atorvastatin (LIPITOR) 10 MG tablet  Self No No   Sig: Take 1 tablet (10 mg) by mouth daily   dimenhyDRINATE (DRAMAMINE) 50 MG tablet  Self Yes No   Sig: Take 50 mg by mouth as needed   famotidine (PEPCID) 40 MG tablet  Self Yes No   Sig: Take 40 mg by mouth 2 times daily   ferrous sulfate (FE TABS) 325 (65 Fe) MG EC tablet  Self Yes No   Sig: Take 325 mg by mouth 2 times daily   fluticasone-vilanterol (BREO ELLIPTA) 200-25 MCG/INH inhaler  Self Yes No   Sig: Inhale 1 puff into the lungs daily   gabapentin (NEURONTIN) 300 MG capsule  Self Yes No   Sig: Take 600 mg by mouth At Bedtime (2 x 300 mg = 600 mg)   metoprolol succinate ER (TOPROL XL) 25 MG 24 hr tablet   No No   Sig: Take 0.5 tablets (12.5 mg) by mouth 2 times daily ; Hold for SBP<100 or HR<55.   nystatin (MYCOSTATIN) 010037 UNIT/GM external powder   Yes No   Sig: Apply to groin/abdomen folds topically two times a day for  redness until healed then D/C   ondansetron (ZOFRAN ODT) 4 MG ODT tab   No No   Sig: Take 1 tablet (4 mg) by mouth every 8 hours as needed for nausea Dissolve ON the tongue.   polyethylene glycol (MIRALAX) 17 g packet   No No   Sig: Take 17 g by mouth daily   senna-docusate (SENOKOT-S/PERICOLACE) 8.6-50 MG tablet   No No   Sig: Take 1 tablet by mouth 2 times daily as needed for constipation Take while on oral narcotics to prevent or treat constipation.   sulfamethoxazole-trimethoprim (BACTRIM DS) 800-160 MG tablet   Yes No   Sig: Take 1 tablet by mouth 2 times daily   Patient not taking: Reported on 8/23/2022      Facility-Administered Medications: None     Allergies   Allergies   Allergen Reactions     Omeprazole Itching     Pantoprazole Itching     Prevacid [Lansoprazole] Itching     Lasix [Furosemide] Rash      "Lidocaine Blisters and Rash     Allergy to lidocaine ointment  Allergy to lidocaine ointment       Penicillin G Rash     Penicillins Rash     \"broke out from injection\" 60 yrs ago  Tolerates cephalosporins       Social History   I have reviewed this patient's social history and updated it with pertinent information if needed. Hermilo Velasquez  reports that he quit smoking about 24 years ago. He has a 110.00 pack-year smoking history. He has never used smokeless tobacco. He reports previous alcohol use. He reports that he does not use drugs.    Family History   I have reviewed this patient's family history and updated it with pertinent information if needed.   Family History   Problem Relation Age of Onset     C.A.D. Father      Emphysema Father      Melanoma No family hx of      Skin Cancer No family hx of        Review of Systems   The 10 point Review of Systems is negative other than noted in the HPI or here.     Physical Exam   Temp: 97.8  F (36.6  C) Temp src: Temporal BP: 130/63 Pulse: 73   Resp: 12 SpO2: 95 % O2 Device: Nasal cannula Oxygen Delivery: 2 LPM  Vital Signs with Ranges  Temp:  [97.7  F (36.5  C)-97.8  F (36.6  C)] 97.8  F (36.6  C)  Pulse:  [72-75] 73  Resp:  [12-18] 12  BP: ()/(26-63) 130/63  SpO2:  [83 %-98 %] 95 %  165 lbs 0 oz    Gen: AAOX2-3, NAD, hard of hearing  HEENT: Pallor +  neck: Supple neck, good ROM, no stridor  Resp: Decreased air entry at both the bases, normal effort of breathing  CVS- RRR, no murmur, no edema  Abd/GI: Soft, non-tender. BS- normoactive  Skin- Warm, dry, no rashes  MSK: Recent right knee incision is bandaged, appears dry without any drainage or bleeding.  Neuro- CN- intact. Moving X 4    Data   Data reviewed today:  I personally reviewed the EKG tracing showing Sinus rhythm with right bundle branch block..  Recent Labs   Lab 08/23/22  1712 08/23/22  1426   WBC 8.5  --    HGB 5.3* 5.7*   MCV 97  --      --    INR 1.65*  --      --    POTASSIUM " 3.9  --    CHLORIDE 112*  --    CO2 24  --    BUN 50*  --    CR 2.35*  --    ANIONGAP 6  --    AMRITA 8.5  --    GLC 95  --    ALBUMIN 2.6*  --    PROTTOTAL 6.1*  --    BILITOTAL 0.2  --    ALKPHOS 112  --    ALT 22  --    AST 24  --        No results found for this or any previous visit (from the past 24 hour(s)).

## 2022-08-23 NOTE — ED PROVIDER NOTES
History   Chief Complaint:  Anemia     The history is provided by the patient and medical records.      Hermilo Velasquez is a 89 year old male on Eliquis with history of Non Hodgkin's lymphoma, lung cancer, anemia due to blood loss and Afib who presents with anemia. Per chart review, patient was seen this morning at his clinic and had labs done. He was found to have a low hemoglobin, as below. Patient denies lightheadedness, dizziness, chest pain, abdominal pain, black/bloody stools, hematuria or hemoptysis. He adds that he had knee surgery 3 weeks ago and has had no complications. He is on Eliquis.     Laboratory results 08/23/22 [facility]  Hemoglobin: 5.7 (LL)    Review of Systems   Respiratory: Negative for cough (no hemoptysis).    Cardiovascular: Negative for chest pain.   Gastrointestinal: Negative for abdominal pain and blood in stool.   Genitourinary: Negative for hematuria.   Neurological: Negative for dizziness and light-headedness.   All other systems reviewed and are negative.    Allergies:  Omeprazole  Pantoprazole  Prevacid [Lansoprazole]  Lasix [Furosemide]  Lidocaine  Penicillin G  Penicillins    Medications:  Albuterol inhaler  Alfuzosin ER  Eliquis   Atorvastatin  Dimenhydrinate  Famotidine   Breo ellipta   Gabapentin  Metoprolol succinate ER  Miralax   Senna  Bactrim DS    Past Medical History:      Nonrheumatic aortic valve stenosis  GERD  Non Hodgkin's lymphoma   Anemia due to blood loss  Afib  Need for SBE prophylaxis  Hyperlipidemia  COPD  Lung cancer  Centrilobular emphysema  Heart murmur, systolic  FRED  CKD, stage 3  Pneumonia  Congestive heart failure     Past Surgical History:    Appendectomy  Right knee arthroplasty  Back surgery  Ventral hernia repair  Lung lobectomy  Repair ankle ligament  Replace aortic valve  Right wedge resection lung, thoracotomy  Tonsillectomy  TURP     Family History:    Father - CAD, emphysema    Social History:  Presents alone  Presents  "via private vehicle  PCP: Karson Bishop     Physical Exam     Patient Vitals for the past 24 hrs:   BP Temp Temp src Pulse Resp SpO2 Height Weight   08/23/22 2100 100/85 -- -- 79 (!) 32 93 % -- --   08/23/22 2000 131/65 -- -- 75 16 96 % -- --   08/23/22 1945 132/66 -- -- 75 9 96 % -- --   08/1935 135/55 97.6  F (36.4  C) -- 75 14 95 % -- --   08/23/22 1930 128/63 97.5  F (36.4  C) -- 72 14 96 % -- --   08/23/22 1925 134/67 -- -- 74 12 96 % -- --   08/23/22 1921 132/65 97.6  F (36.4  C) -- 75 16 -- -- --   08/23/22 1920 132/65 -- -- 71 20 96 % -- --   08/23/22 1915 132/62 -- -- 73 14 96 % -- --   08/23/22 1900 132/59 -- -- 75 10 96 % -- --   08/23/22 1845 -- -- -- 73 14 96 % -- --   08/23/22 1830 133/61 -- -- 75 (!) 42 97 % -- --   08/23/22 1815 134/66 -- -- 73 11 98 % -- --   08/23/22 1800 135/69 -- -- 74 9 97 % -- --   08/23/22 1745 131/60 -- -- 73 12 95 % -- --   08/23/22 1730 130/63 -- -- 73 12 93 % -- --   08/23/22 1715 126/56 -- -- 72 14 94 % -- --   08/23/22 1700 -- -- -- 74 17 (!) 83 % -- --   08/23/22 1645 118/59 -- -- -- -- -- -- --   08/23/22 1634 (!) 98/26 97.8  F (36.6  C) Temporal 74 18 98 % 1.727 m (5' 8\") 74.8 kg (165 lb)       Physical Exam  Constitutional: Elderly white male, supine, no respiratory distress.  HENT: No signs of trauma.   Eyes: EOM are normal. Pupils are equal, round, and reactive to light.   Neck: Normal range of motion. No JVD present. No cervical adenopathy.  Cardiovascular: Regular rhythm.  Exam reveals no gallop and no friction rub.    No murmur heard. Sternotomy incision present.  Pulmonary/Chest: Bilateral breath sounds normal. No wheezes, rhonchi or rales.  Abdominal: Soft. No tenderness. No rebound or guarding. Midline abdominal incision. Paramidline abdominal right lower quadrant incision. 1+ femoral pulses.   Rect: gray stools  Musculoskeletal: No edema. No tenderness. 1+ edema left leg, no tenderness. 2+ edema right leg with surgical dressing over dorsum of knee, " no focal tenderness.   Lymphadenopathy: No lymphadenopathy.   Neurological: Alert and oriented to person, place, and time. Normal strength. Coordination normal.   Skin: Skin is warm and dry. No rash noted. No erythema.     Emergency Department Course   ECG  ECG results from 08/23/22 @ 1709   EKG 12-lead, tracing only     Value    Systolic Blood Pressure     Diastolic Blood Pressure     Ventricular Rate 72    Atrial Rate 72    [place] Interval 194    QRS Duration 140        QTc 510    P Axis 78    R AXIS 8    T Axis 64    Interpretation ECG      Sinus rhythm  Right bundle branch block     Laboratory:  Labs Ordered and Resulted from Time of ED Arrival to Time of ED Departure   INR - Abnormal       Result Value    INR 1.65 (*)    COMPREHENSIVE METABOLIC PANEL - Abnormal    Sodium 142      Potassium 3.9      Chloride 112 (*)     Carbon Dioxide (CO2) 24      Anion Gap 6      Urea Nitrogen 50 (*)     Creatinine 2.35 (*)     Calcium 8.5      Glucose 95      Alkaline Phosphatase 112      AST 24      ALT 22      Protein Total 6.1 (*)     Albumin 2.6 (*)     Bilirubin Total 0.2      GFR Estimate 26 (*)    CBC WITH PLATELETS AND DIFFERENTIAL - Abnormal    WBC Count 8.5      RBC Count 1.85 (*)     Hemoglobin 5.3 (*)     Hematocrit 18.0 (*)     MCV 97      MCH 28.6      MCHC 29.4 (*)     RDW 19.4 (*)     Platelet Count 225      % Neutrophils 58      % Lymphocytes 11      % Monocytes 10      % Eosinophils 20      % Basophils 0      % Immature Granulocytes 1      NRBCs per 100 WBC 0      Absolute Neutrophils 5.0      Absolute Lymphocytes 1.0      Absolute Monocytes 0.8      Absolute Eosinophils 1.7 (*)     Absolute Basophils 0.0      Absolute Immature Granulocytes 0.1      Absolute NRBCs 0.0     COVID-19 VIRUS (CORONAVIRUS) BY PCR - Normal    SARS CoV2 PCR Negative     TYPE AND SCREEN, ADULT    ABO/RH(D) A POS      Antibody Screen Negative      SPECIMEN EXPIRATION DATE [id number]     PREPARE RED BLOOD CELLS (UNIT)    Blood  Component Type Red Blood Cells      Product Code T9543Y30      Unit Status Transfused      Unit Number [id number]      CROSSMATCH Compatible      CODING SYSTEM [id number]      ISSUE DATE AND TIME [id number]      UNIT ABO/RH A+      UNIT TYPE ISBT 6200     PREPARE RED BLOOD CELLS (UNIT)    Blood Component Type Red Blood Cells      Product Code V4478B22      Unit Status Ready for issue      Unit Number [id number]      CROSSMATCH Compatible      CODING SYSTEM [id number]     PREPARE RED BLOOD CELLS (UNIT)   TRANSFUSE RED BLOOD CELLS (UNIT)   ABO/RH TYPE AND SCREEN      Emergency Department Course:     Reviewed:  I reviewed nursing notes, vitals, past medical history and Care Everywhere    Assessments:  1653 I obtained history and examined the patient as noted above.   1950 I rechecked the patient and explained findings.     Consults:  1847 I spoke to Dr. Kathleen, hospitalist, who accepts admission.     Interventions:  1730 Pantoprazole, 80 mg, IV    Disposition:  The patient was admitted to the hospital under the care of Dr. Kathleen.     Impression & Plan   Medical Decision Making:  Hermilo Velasquez is a 89 year old male who comes here after his doctor checked blood and found the patient's hemoglobin to be in the mid 5s. He had knee surgery 3 weeks ago. He was not complaining of anything, so he is unsure why he had to have blood drawn in the office. After his hemoglobin was found low, he was sent here. The patient is on Eliquis for a history of recurrent PE's and atrial fibrillation. He has had a workup for a GI bleed in the past and found to have gastric AVMs. Patient denies any black or bloody stool, but when I examine him there is gray appearing stool. We did not have hemoccult cards here. Patient in addition has non Hodgkin's lymphoma and is in remission. He also has a history of lung cancer. His labs are repeated here and his hemoglobin remains low. I have cross matched 2 units of bagged cells and have  given him IV Protonix. Hemodynamically his blood pressure initially was in the 90s systolic, but is now in the 130s. He remains asymptomatic. I spoke with Dr. Kathleen regarding castrum reverse, but given the fact that he is hemodynamically stable and has significant risk factors for recurrent PE, we will simply hold his Eliquis and let this improve on its own.     Diagnosis:    ICD-10-CM    1. Anemia, unspecified type  D64.9    2. Gastrointestinal hemorrhage, unspecified gastrointestinal hemorrhage type  K92.2      Scribe Disclosure:  I, Yaya Reyes, am serving as a scribe at 5:01 PM on 8/23/2022 to document services personally performed by Caden Farris MD based on my observations and the provider's statements to me.          Caden Farris MD  08/23/22 1103

## 2022-08-23 NOTE — TELEPHONE ENCOUNTER
Liz from [facility] calling ([phone], secure line).     Liz requesting verbal orders for:    1. Physical therapy once a week x1 week and then twice a week x6 weeks.     2. Occupational therapy twice a week x4 weeks and then once a week x1 week.    Lidya Vela RN BSN MSN  [facility]

## 2022-08-23 NOTE — PATIENT INSTRUCTIONS
We are checking your urine to make sure you do not have a urinary tract infection that is making you leaky.    The duloxetine (Cymbalta) that you are taking for pain, can stop up your urinating, so I think we should stop that drug.  You say it does not help your pain much anyway.    The torsemide (Demadex) is not necessary.  Put it aside.    Start a new medication alfuzosin (uroxatral) to help you empty your bladder around an enlarged prostate.  I sent an prescription to [facility].  This hopefully will stop the leaking.    Since you are feeling weak, lets check your blood count again today.      Lets set up a follow up appointment next week to see how this is going.

## 2022-08-23 NOTE — ED TRIAGE NOTES
89-year-old male presents to the ED with complaints of low Hgb 5.4 that was drawn earlier today. Pt had knee surgery 3 weeks ago. Unsure why he had labs done today. Blood pressure is in the 90s at triage. Brought back to a room.      Triage Assessment     Row Name 08/23/22 [id number]       Triage Assessment (Adult)    Airway WDL WDL       Respiratory WDL    Respiratory WDL WDL       Skin Circulation/Temperature WDL    Skin Circulation/Temperature WDL WDL       Cardiac WDL    Cardiac WDL WDL       Peripheral/Neurovascular WDL    Peripheral Neurovascular WDL WDL       Cognitive/Neuro/Behavioral WDL    Cognitive/Neuro/Behavioral WDL WDL

## 2022-08-23 NOTE — LETTER
Transition Communication Hand-off for Care Transitions to Next Level of Care Provider    Name: Hermilo Velasquez  : 11/3/1932  MRN #: [id number]  Primary Care Provider: Karson Bishop     Primary Clinic: [address]  [place] [address]     Reason for Hospitalization:  Gastrointestinal hemorrhage, unspecified gastrointestinal hemorrhage type [K92.2]  Anemia, unspecified type [D64.9]  Admit Date/Time: 2022  4:36 PM  Discharge Date: 22  Payor Source: Payor: [facility] / Plan: [facility] MEDICARE / Product Type: HMO /     Readmission Assessment Measure (GIOVANNY) Risk Score/category: 44%           Reason for Communication Hand-off Referral: Other Discharge to TCU    Discharge Plan:       Concern for non-adherence with plan of care:   No  Discharge Needs Assessment:  Needs    Flowsheet Row Most Recent Value   Equipment Currently Used at Home walker, rolling   PAS Number [id number]          Already enrolled in Tele-monitoring program and name of program:  No  Follow-up specialty is recommended: Yes    Follow-up plan:    Future Appointments   Date Time Provider Department Center   2022  3:45 PM Mirian Johnston OTR [facility]       Any outstanding tests or procedures:        Referrals     Future Labs/Procedures    Medication Therapy Management Referral     Process Instructions:        This referral will be filtered to a centralized scheduling office at [facility] and the patient will receive a call to schedule an appointment at a [place] location most convenient for them.    Scheduling Instructions:    MTM referral reason  Total Score: 2           Patient had a hospital or ED visit in last 6 months and has more than 10   PTA or Discharge medications    Patient has 5 PTA or Discharge Medications AND one of the following   diagnoses: DM,HF,COPD, or AMI DX       Comments:    This service is designed to help you get the most from your medications.  A specially trained pharmacist will  work closely with you and your doctors  to solve any problems related to your medications and to help you get the   best results from taking them.      The Medication Therapy Management staff will call you to schedule an appointment.        Occupational Therapy Adult Consult     Comments:    Evaluate and treat as clinically indicated.    Reason:  weakness    Physical Therapy Adult Consult     Comments:    Evaluate and treat as clinically indicated.    Reason:  weakness            Key Recommendations:      SY Trejo    AVS/Discharge Summary is the source of truth; this is a helpful guide for improved communication of patient story

## 2022-08-23 NOTE — PROGRESS NOTES
"  Assessment & Plan     Dysuria  Treat UTI if present   - UA Macro with Reflex to Micro and Culture - lab collect; Future  - UA Macro with Reflex to Micro and Culture - lab collect  - Urine Microscopic    Primary localized osteoarthrosis of right lower leg  Post TKA, given his age and problems it is going to be a khris road to recovery, but overall, I think he is doing OK     Benign prostatic hyperplasia with post-void dribbling  He got dizzy on flomax  Lets try below to see if he does any better  If he cannot tolerate, then see urology  If Cr up, then get renal ultrasound and see urology  - alfuzosin ER (UROXATRAL) 10 MG 24 hr tablet; Take 1 tablet (10 mg) by mouth daily  - Creatinine; Future  - Urea nitrogen; Future    Anemia due to blood loss, acute  recheck  - Hemoglobin; Future      33 minutes spent on the date of the encounter doing chart review, history and exam, documentation and further activities per the note      Short term follow up is needed       Return in about 1 week (around 8/30/2022) for recheck (F2F in virtual slot OK).  Patient instructed to return to clinic or contact us sooner if symptoms worsen or new symptoms develop.     Karson Bishop MD  [facility] [place] Akhtar is a 89 year old accompanied by his spouse, presenting for the following health issues:  Urinary Problem and Follow Up      HPI     UA F/U  History almost very challenging due to hearing loss  Wife did not add much additional information  He continues to leak urine  He denies fevers or chills; intermittent dysuria noted  He never started torsemide since he is concerned due to allergy to lasix  He denies orthopnea, PND, LAROSE  No new leg swelling    Review of Systems         Objective    BP 93/42 (BP Location: Left arm, Cuff Size: Adult Regular)   Pulse 75   Temp 97.7  F (36.5  C) (Oral)   Resp 16   Ht 1.778 m (5' 10\")   Wt 75.3 kg (165 lb 14.4 oz)   SpO2 94%   BMI 23.80 kg/m    Body mass index is " 23.8 kg/m .  Physical Exam   GEN:  Frail elderly man  CV:  Heart with regular rate and rhythm.   PULM:  No crackles or rales   :  No CVA tenderness, no suprapubic tenderness; large prostate, no nodules, no tenderness   EXT:  Trace to 1+ edema ; surgical wound well healing  NEURO:  Using walker, no focal weakness    UA pending     Labs pending             .  ..

## 2022-08-23 NOTE — PROGRESS NOTES
I called Hermilo and told him to go to ER for transfusion  His wife will drive him  I will call ER to inform them

## 2022-08-24 LAB
ANION GAP SERPL CALCULATED.3IONS-SCNC: 5 MMOL/L (ref 3–14)
BLD PROD TYP BPU: NORMAL
BLD PROD TYP BPU: NORMAL
BLOOD COMPONENT TYPE: NORMAL
BLOOD COMPONENT TYPE: NORMAL
BUN SERPL-MCNC: 45 MG/DL (ref 7–30)
BUN SERPL-MCNC: 49 MG/DL (ref 7–30)
CALCIUM SERPL-MCNC: 8.4 MG/DL (ref 8.5–10.1)
CHLORIDE BLD-SCNC: 114 MMOL/L (ref 94–109)
CO2 SERPL-SCNC: 24 MMOL/L (ref 20–32)
CODING SYSTEM: NORMAL
CODING SYSTEM: NORMAL
CREAT SERPL-MCNC: 2.17 MG/DL (ref 0.66–1.25)
CREAT SERPL-MCNC: 2.29 MG/DL (ref 0.66–1.25)
CREAT UR-MCNC: 52 MG/DL
CROSSMATCH: NORMAL
CROSSMATCH: NORMAL
GFR SERPL CREATININE-BSD FRML MDRD: 27 ML/MIN/1.73M2
GFR SERPL CREATININE-BSD FRML MDRD: 28 ML/MIN/1.73M2
GLUCOSE BLD-MCNC: 96 MG/DL (ref 70–99)
HGB BLD-MCNC: 6.4 G/DL (ref 13.3–17.7)
HGB BLD-MCNC: 7 G/DL (ref 13.3–17.7)
ISSUE DATE AND TIME: NORMAL
MICROALBUMIN UR-MCNC: 23 MG/L
MICROALBUMIN/CREAT UR: 44.23 MG/G CR (ref 0–17)
POTASSIUM BLD-SCNC: 4 MMOL/L (ref 3.4–5.3)
SODIUM SERPL-SCNC: 143 MMOL/L (ref 133–144)
UNIT ABO/RH: NORMAL
UNIT ABO/RH: NORMAL
UNIT NUMBER: NORMAL
UNIT NUMBER: NORMAL
UNIT STATUS: NORMAL
UNIT STATUS: NORMAL
UNIT TYPE ISBT: 6200
UNIT TYPE ISBT: 6200

## 2022-08-24 PROCEDURE — P9016 RBC LEUKOCYTES REDUCED: HCPCS | Performed by: EMERGENCY MEDICINE

## 2022-08-24 PROCEDURE — 99233 SBSQ HOSP IP/OBS HIGH 50: CPT | Performed by: INTERNAL MEDICINE

## 2022-08-24 PROCEDURE — 99233 SBSQ HOSP IP/OBS HIGH 50: CPT | Performed by: NURSE PRACTITIONER

## 2022-08-24 PROCEDURE — 250N000013 HC RX MED GY IP 250 OP 250 PS 637: Performed by: PHYSICIAN ASSISTANT

## 2022-08-24 PROCEDURE — 36415 COLL VENOUS BLD VENIPUNCTURE: CPT | Performed by: INTERNAL MEDICINE

## 2022-08-24 PROCEDURE — 85018 HEMOGLOBIN: CPT | Performed by: INTERNAL MEDICINE

## 2022-08-24 PROCEDURE — 120N000001 HC R&B MED SURG/OB

## 2022-08-24 PROCEDURE — 250N000013 HC RX MED GY IP 250 OP 250 PS 637: Performed by: INTERNAL MEDICINE

## 2022-08-24 PROCEDURE — 82310 ASSAY OF CALCIUM: CPT | Performed by: INTERNAL MEDICINE

## 2022-08-24 RX ORDER — GABAPENTIN 300 MG/1
600 CAPSULE ORAL AT BEDTIME
Status: DISCONTINUED | OUTPATIENT
Start: 2022-08-24 | End: 2022-08-24

## 2022-08-24 RX ORDER — POLYETHYLENE GLYCOL 3350 17 G/17G
238 POWDER, FOR SOLUTION ORAL ONCE
Status: COMPLETED | OUTPATIENT
Start: 2022-08-24 | End: 2022-08-24

## 2022-08-24 RX ORDER — ALFUZOSIN HYDROCHLORIDE 10 MG/1
10 TABLET, EXTENDED RELEASE ORAL DAILY
Status: DISCONTINUED | OUTPATIENT
Start: 2022-08-24 | End: 2022-09-08 | Stop reason: HOSPADM

## 2022-08-24 RX ORDER — ATORVASTATIN CALCIUM 10 MG/1
10 TABLET, FILM COATED ORAL DAILY
Status: DISCONTINUED | OUTPATIENT
Start: 2022-08-24 | End: 2022-09-08 | Stop reason: HOSPADM

## 2022-08-24 RX ORDER — ALBUTEROL SULFATE 0.83 MG/ML
2.5 SOLUTION RESPIRATORY (INHALATION) EVERY 6 HOURS PRN
Status: DISCONTINUED | OUTPATIENT
Start: 2022-08-24 | End: 2022-09-08 | Stop reason: HOSPADM

## 2022-08-24 RX ORDER — BISACODYL 5 MG
10 TABLET, DELAYED RELEASE (ENTERIC COATED) ORAL ONCE
Status: COMPLETED | OUTPATIENT
Start: 2022-08-24 | End: 2022-08-24

## 2022-08-24 RX ORDER — GABAPENTIN 300 MG/1
300 CAPSULE ORAL AT BEDTIME
Status: DISCONTINUED | OUTPATIENT
Start: 2022-08-24 | End: 2022-08-31

## 2022-08-24 RX ORDER — BENZTROPINE MESYLATE 1 MG/1
1 TABLET ORAL 3 TIMES DAILY PRN
Status: DISCONTINUED | OUTPATIENT
Start: 2022-08-24 | End: 2022-09-08 | Stop reason: HOSPADM

## 2022-08-24 RX ORDER — FAMOTIDINE 40 MG/1
40 TABLET, FILM COATED ORAL 2 TIMES DAILY
COMMUNITY
End: 2023-01-31

## 2022-08-24 RX ORDER — HALOPERIDOL 5 MG/ML
2 INJECTION INTRAMUSCULAR EVERY 6 HOURS PRN
Status: DISCONTINUED | OUTPATIENT
Start: 2022-08-24 | End: 2022-08-25

## 2022-08-24 RX ORDER — FAMOTIDINE 20 MG/1
40 TABLET, FILM COATED ORAL
Status: DISCONTINUED | OUTPATIENT
Start: 2022-08-24 | End: 2022-08-29

## 2022-08-24 RX ADMIN — FAMOTIDINE 40 MG: 20 TABLET ORAL at 12:20

## 2022-08-24 RX ADMIN — POLYETHYLENE GLYCOL 3350 238 G: 17 POWDER, FOR SOLUTION ORAL at 19:56

## 2022-08-24 RX ADMIN — Medication 12.5 MG: at 12:20

## 2022-08-24 RX ADMIN — BISACODYL 10 MG: 5 TABLET, COATED ORAL at 11:46

## 2022-08-24 RX ADMIN — ATORVASTATIN CALCIUM 10 MG: 10 TABLET, FILM COATED ORAL at 12:20

## 2022-08-24 RX ADMIN — ALFUZOSIN HYDROCHLORIDE 10 MG: 10 TABLET, EXTENDED RELEASE ORAL at 14:58

## 2022-08-24 ASSESSMENT — ACTIVITIES OF DAILY LIVING (ADL)
ADLS_ACUITY_SCORE: 30
ADLS_ACUITY_SCORE: 26
ADLS_ACUITY_SCORE: 30
ADLS_ACUITY_SCORE: 30
ADLS_ACUITY_SCORE: 26
ADLS_ACUITY_SCORE: 30
ADLS_ACUITY_SCORE: 26
DEPENDENT_IADLS:: COOKING;SHOPPING;MEAL PREPARATION
ADLS_ACUITY_SCORE: 26
ADLS_ACUITY_SCORE: 30
ADLS_ACUITY_SCORE: 26

## 2022-08-24 NOTE — PROGRESS NOTES
Problem: suspected GI bleed- HgB 5.3 on admit  Vitals and oxygen: VSS on RA  Orientation/Neuro: A&Ox4, Kaw   Activity Level: A1  Diet: NPO  Comorbidity:  GERD, A-fib, HTN, COPD, CKD, & non Hodgkin's lymphoma and is in remission  Labs: creatinine 2.35, HgB  Q6H- HgB @ 0600 was 6.4. 1 unit RBC's started.   IV Fluids/Drains/Tubes: R PIV infusing NS 100ml/hr  Pain Management: denies pain  Skin: scattered brusies  GI: WDL  : WDL  Respiratory: WDL  Cardio: A-fib, had 1 units PRBC in ED.   Plan: GI consult in AM  Other notes indicated 2 units PRBC's were started in ED however blood bank reported only 1 unit given. 2nd unit order released and hospitalist paged for additional conditional blood transfusion orders- received.

## 2022-08-24 NOTE — PHARMACY-ADMISSION MEDICATION HISTORY
Pharmacy Medication History  Admission medication history interview status for the 8/23/2022  admission is complete. See EPIC admission navigator for prior to admission medications     Location of Interview: Patient room  Medication history sources: Patient and Office visit 8/23 octaviano Caruso    Significant changes made to the medication list:  Flagged: on 8/23 told to stop duloxetine, torsemide  New: was to start Uroxatral but has not started yet    In the past week, patient estimated taking medication this percent of the time: greater than 90%    Additional medication history information:   none    Medication reconciliation completed by provider prior to medication history? No    Time spent in this activity: 20 min    Prior to Admission medications    Medication Sig Last Dose Taking? Auth Provider Long Term End Date   acetaminophen (TYLENOL) 500 MG tablet Take 1,000 mg by mouth 3 times daily as needed Unknown at Unknown time Yes Reported, Patient     albuterol (PROAIR HFA/PROVENTIL HFA/VENTOLIN HFA) 108 (90 Base) MCG/ACT inhaler Inhale 2 puffs into the lungs every 6 hours as needed Unknown at Unknown time Yes Reported, Patient Yes    albuterol (PROVENTIL) (2.5 MG/3ML) 0.083% neb solution Take 1 vial (2.5 mg) by nebulization every 6 hours as needed for shortness of breath / dyspnea or wheezing Unknown at Unknown time Yes Karson Bishop MD Yes    alfuzosin ER (UROXATRAL) 10 MG 24 hr tablet Take 1 tablet (10 mg) by mouth daily Unknown at Unknown time Yes Karson Bishop MD     apixaban ANTICOAGULANT (ELIQUIS) 5 MG tablet Take 1 tablet (5 mg) by mouth 2 times daily Unknown at Unknown time Yes Leland Trinh MD No    atorvastatin (LIPITOR) 10 MG tablet Take 1 tablet (10 mg) by mouth daily Unknown at Unknown time Yes Karson Bishop MD Yes    dimenhyDRINATE (DRAMAMINE) 50 MG tablet Take 50 mg by mouth as needed Unknown at Unknown time Yes Reported, Patient     DULoxetine (CYMBALTA) 60 MG capsule Take 1  capsule daily for 7 days then take 2 capsules daily Unknown at Unknown time Yes Unknown, Entered By History No    famotidine (PEPCID) 40 MG tablet Take 40 mg by mouth 2 times daily Unknown at Unknown time Yes Unknown, Entered By History     ferrous sulfate (FE TABS) 325 (65 Fe) MG EC tablet Take 325 mg by mouth 2 times daily Unknown at Unknown time Yes Reported, Patient     fluticasone-vilanterol (BREO ELLIPTA) 200-25 MCG/INH inhaler Inhale 1 puff into the lungs daily Unknown at Unknown time Yes Unknown, Entered By History     gabapentin (NEURONTIN) 300 MG capsule Take 600 mg by mouth At Bedtime (2 x 300 mg = 600 mg) Unknown at Unknown time Yes Unknown, Entered By History No    metoprolol succinate ER (TOPROL XL) 25 MG 24 hr tablet Take 0.5 tablets (12.5 mg) by mouth 2 times daily ; Hold for SBP<100 or HR<55. Unknown at Unknown time Yes Leland Trinh MD Yes    nystatin (MYCOSTATIN) 974143 UNIT/GM external powder Apply to groin/abdomen folds topically two times a day for  redness until healed then D/C Unknown at Unknown time Yes Reported, Patient     ondansetron (ZOFRAN ODT) 4 MG ODT tab Take 1 tablet (4 mg) by mouth every 8 hours as needed for nausea Dissolve ON the tongue. Unknown at Unknown time Yes Soila Rivera PA-C     polyethylene glycol (MIRALAX) 17 g packet Take 17 g by mouth daily Unknown at Unknown time Yes Giuliano Deshpande MD     senna-docusate (SENOKOT-S/PERICOLACE) 8.6-50 MG tablet Take 1 tablet by mouth 2 times daily as needed for constipation Take while on oral narcotics to prevent or treat constipation. Unknown at Unknown time Yes Soila Rivera PA-C No    tiotropium (SPIRIVA) 18 MCG inhaled capsule Inhale 18 mcg into the lungs daily Unknown at Unknown time Yes Unknown, Entered By History No    torsemide (DEMADEX) 10 MG tablet Take 10 mg by mouth daily  Yes Unknown, Entered By History No    triamcinolone (KENALOG) 0.1 % external cream Apply topically 2 times daily as needed for irritation  Unknown at Unknown time Yes Unknown, Entered By History     ACE/ARB/ARNI NOT PRESCRIBED (INTENTIONAL) Please choose reason not prescribed from choices below.  Patient not taking: No sig reported   Liv Mc MD Yes        The information provided in this note is only as accurate as the sources available at the time of update(s)

## 2022-08-24 NOTE — PLAN OF CARE
Problem: Acute blood los on chronic anemia due to suspected GI bleed  Hx: chronic AFIB, recent R knee arthoplasty  Vitals: VSS on RA  Orientation/Neuro: Oriented to self and sometimes place  Activity Level: A1 GBW  Diet: clears, NPO at midnight  GI: No BM, denies N/V  : Incontinent, needs reminding for toileting  Labs: Hgb was 6.4 and received 1 uit of RBCs at the end of NOC shift  IV Fluids/Drains/Tubes: PIV infusing NS 100ml/hr  Incisions/Dressings:   Pain Management: Minimal R knee pain, denied intervention  Skin: R knee dressing CDI  Plan: Plan for EGD and colonoscopy tomorrow. Will do bowel prep tonight

## 2022-08-24 NOTE — TELEPHONE ENCOUNTER
Call to Liz from [facility] at [phone]. Liz informed of Dr. Bishop's below response. Liz verbalized understanding.     Lidya Vela, RN BSN MSN  [facility]

## 2022-08-24 NOTE — CONSULTS
Care Management Initial Consult    General Information  Assessment completed with: Patient, VM-chart review, Spouse or significant other, Roberta via phone  Type of CM/SW Visit: Initial Assessment    Primary Care Provider verified and updated as needed: Yes   Readmission within the last 30 days: current reason for admission unrelated to previous admission   Return Category: New Diagnosis  Reason for Consult: other (see comments) (elevated risk score)  Advance Care Planning:            Communication Assessment  Patient's communication style: spoken language (English or Bilingual)    Hearing Difficulty or Deaf: yes   Wear Glasses or Blind: yes    Cognitive  Cognitive/Neuro/Behavioral: WDL                      Living Environment:   People in home: spouse     Current living Arrangements: house      Able to return to prior arrangements: yes (pending mobility)       Family/Social Support:  Care provided by: self, spouse/significant other  Provides care for: no one  Marital Status:   Wife  Roberta       Description of Support System: Supportive         Current Resources:   Patient receiving home care services: Yes  Skilled Home Care Services: Physicial Therapy, Occupational Therapy ([facility], fax orders on discharge for resumption at [phone])  Community Resources: None  Equipment currently used at home: cane, straight, crutches, grab bar, tub/shower, walker, rolling, other (see comments) (railings to enter home)  Supplies currently used at home: Oxygen Tubing/Supplies, Nebulizer tubing    Employment/Financial:  Employment Status: retired, , previous service        Financial Concerns: No concerns identified   Referral to Financial Worker: No       Lifestyle & Psychosocial Needs:  Social Determinants of Health     Tobacco Use: Medium Risk     Smoking Tobacco Use: Former Smoker     Smokeless Tobacco Use: Never Used   Alcohol Use: Not on file   Financial Resource Strain: Not on file   Food  Insecurity: Not on file   Transportation Needs: Not on file   Physical Activity: Not on file   Stress: Not on file   Social Connections: Not on file   Intimate Partner Violence: Not on file   Depression: Not at risk     PHQ-2 Score: 0   Housing Stability: Not on file       Functional Status:  Prior to admission patient needed assistance:   Dependent ADLs:: Ambulation-cane, Ambulation-walker  Dependent IADLs:: Cooking, Shopping, Meal Preparation       Mental Health Status:  Mental Health Status: No Current Concerns       Chemical Dependency Status:  Chemical Dependency Status: No Current Concerns             Values/Beliefs:  Spiritual, Cultural Beliefs, Confucianist Practices, Values that affect care: no               Additional Information:  Initial consult completed with spouse and patient. Case management will follow along for discharge planning.      Crissy Styles RN   [facility]   Phone [phone]

## 2022-08-24 NOTE — CONSULTS
See previous consult note. Case management is following along for discharge planning.     Crissy Styles RN   [facility]   Phone [phone]

## 2022-08-24 NOTE — PROGRESS NOTES
Cross cover   Overnight MD   Patient has hb of 6.4 and admitted for gi bleed and I did order one unit of blood . Spoke with CECILIA pinon Am team to follow     Thanks

## 2022-08-24 NOTE — PROGRESS NOTES
[facility]    Medicine Progress Note - Hospitalist Service       Date of Admission:  8/23/2022    Assessment & Plan   Hermilo Velasquez is a 89 year old male with hx of known colonic AVMs, chronic a-fib and recurrent PE on chronic AC with apixaban, aortic stenosis s/p TAVR, COPD, and cognitive impairment who was admitted on 8/23/2022 for acute anemia due to suspected GI bleed    Acute blood loss on chronic anemia due to suspected GI bleed  Hx colonic AVMs  * Referred to ED after routine blood work revealed a Hgb 5.8  * No obvious history of GI bleed however patient has history of AVMs in his colon which was treated with APC on 4/12/2022; also on on chronic AC with apixaban  * Transfused 1u pRBCs in the ED  * [facility] consulted on admission  - Received additional 1u pRBCs earlier this morning, 8/24  - Follow Hgb q6h today. Conditional unit ordered for Hgb 7 or less  - EGD and colonoscopy tomorrow, 8/25  - Holding PTA apixaban  - Not on PPI due to allergy  - Continues on PTA famotidine 40 mg BID  - [facility] following, appreciate assistance    FRED on CKD stage IIIa, Improved  * Creatinine up to 2.35 from 0.87 on 7/30/22, although creatinine has previously fluctuated in 1.6-1.9 range  * Suspected pre-renal state due to blood loss, borderline hypotension (noted on arrival), home torsemide, and recent use of Bactrim for UTI  * Improved with IV fluids and blood transfusion  - Creatinine 2.17 today, will follow  - Continues on NS at 100 ml/h, low threshold to d/c for resp distress  - Holding PTA torsemide    Chronic atrial fibrillation  Hx of pulmonary embolism  - Continues on PTA metoprolol 12.5 mg BID  - Holding PTA apixaban in setting of suspected GI bleed  - Initiated discussion regarding risks/benefits of anticoagulation with patient's wife. Conversation with patient limited due to apparent cognitive impairment. Patients wife agrees on holding AC for the time being, but would like to resume  conversation re: long term AC with patient's cardiologist, who he has an appointment with on Fri.     Hx aortic stenosis s/p TAVR  * TTE (8/22): EF 50-55%, unable to evaluate WMA due to limited study; mean AV gradient 14 mmHg  - Appears compensated without signs of volume overload  - Low threshold to d/c IV fluids for resp distress    Peripheral neuropathy  - PTA gabapentin has been decreased from 600 to 300 mg qhs in setting of FRED    Cognitive impairment  * Patient lives with his wife who is his primary caregiver. Also has home care.  - PT/OT consults requested  - On delirium precautions    Otherwise chronic and stable medical conditions  Hx of right TKA on 7/25/2022  COPD  Dyslipidemia  Hx of lung cancer s/p wedge resection (2019)  Hx of non-Hodgkin's Lymphoma, in remission  MGUS  BPH     Diet: Clear Liquid Diet    DVT Prophylaxis: Pneumatic Compression Devices  Suazo Catheter: Not present  Code Status: Full Code         Disposition: Expected discharge once EGD/colonoscopy complete, and Hgb stable, ?Fri. Patient has Cardiology appt on Fri. Unclear if he will be able to make this appt; may need to reschedule    The patient's care was discussed with the Bedside Nurse, Patient and Patient's Family.    Daja Martinez MD  Hospitalist Service  [facility]  Contact information available via [facility] Paging/Directory    ______________________________________________________________________    Interval History   - Admitted last night. Offers no complaints. Denies cp/sob, dizziness/lightheadedness, pain, or nausea.   - Initiated conversation re: goals of care with anticoagulation long term, but conversation was limited by patient's apparent cognitive impairment. Risks/benefits discussed with patient's wife who would prefer to have this conversation with patient's cardiologist, but agrees with holding anticoagulation for now  - Has received 2u prbcs to-date. Will continue to follow Hgb q6h.  Conditional unit ordered    Time Spent on this Encounter   I spent 35 minutes on the unit/floor managing the care of Hermilo Velasquez. Over 50% of my time was spent on the following:   - Counseling the patient and/or family regarding: risks and benefits of treatment options     Daja Martinez MD    Data reviewed today: I reviewed all medications, new labs and imaging results over the last 24 hours. I personally reviewed no images or EKG's today.    Physical Exam   Vital Signs: Temp: 97.8  F (36.6  C) Temp src: Axillary BP: 129/56 Pulse: 76   Resp: 16 SpO2: 97 % O2 Device: Nasal cannula Oxygen Delivery: 1 LPM  Weight: 165 lbs 0 oz  Constitutional: Chronically ill appearing, NAD  HEENT: Sclera white, MMM  Respiratory: Breathing non-labored. Lungs CTAB - no wheezes, crackles, or rhonchi  Cardiovascular: Heart irregular rhythm, normal rate. No pedal edema  GI: +BS, abd soft/NT  Skin/Integument: No rash  Musculoskeletal: Normal muscle bulk and tone  Neuro: Alert, oriented x4, but somewhat confused. FINN  Psych: Calm and cooperative    Data   Recent Labs   Lab 08/24/22  0411 08/23/22  1712 08/23/22  1426   WBC  --  8.5  --    HGB 6.4* 5.3* 5.7*   MCV  --  97  --    PLT  --  225  --    INR  --  1.65*  --     142  --    POTASSIUM 4.0 3.9  --    CHLORIDE 114* 112*  --    CO2 24 24  --    BUN 45* 50*  --    CR 2.17* 2.35*  --    ANIONGAP 5 6  --    AMRITA 8.4* 8.5  --    GLC 96 95  --    ALBUMIN  --  2.6*  --    PROTTOTAL  --  6.1*  --    BILITOTAL  --  0.2  --    ALKPHOS  --  112  --    ALT  --  22  --    AST  --  24  --          No results found for this or any previous visit (from the past 24 hour(s)).    Medications     sodium chloride 100 mL/hr at 08/23/22 2258       bisacodyl  10 mg Oral Once    Followed by     polyethylene glycol  238 g Oral Once     sodium chloride (PF)  3 mL Intracatheter Q8H

## 2022-08-24 NOTE — ED NOTES
"[facility]  ED Nurse Handoff Report    ED Chief complaint: Abnormal Labs (Patient with hemoglobin of 5.7, told to come to ED by primary for blood transfusion.)      ED Diagnosis:   Final diagnoses:   Anemia, unspecified type   Gastrointestinal hemorrhage, unspecified gastrointestinal hemorrhage type       Code Status: admitting physician to determine    Allergies:   Allergies   Allergen Reactions     Omeprazole Itching     Pantoprazole Itching     Prevacid [Lansoprazole] Itching     Lasix [Furosemide] Rash     Lidocaine Blisters and Rash     Allergy to lidocaine ointment  Allergy to lidocaine ointment       Penicillin G Rash     Penicillins Rash     \"broke out from injection\" 60 yrs ago  Tolerates cephalosporins       Patient Story: 89-year-old male presents to the ED with complaints of low Hgb 5.4 that was drawn earlier today. Pt had knee surgery 3 weeks ago. Unsure why he had labs done today. Blood pressure is in the 90s at triage. Brought back to a room.      Focused Assessment:  Patient sleepy    Treatments and/or interventions provided:   Labs Ordered and Resulted from Time of ED Arrival to Time of ED Departure   INR - Abnormal       Result Value    INR 1.65 (*)    COMPREHENSIVE METABOLIC PANEL - Abnormal    Sodium 142      Potassium 3.9      Chloride 112 (*)     Carbon Dioxide (CO2) 24      Anion Gap 6      Urea Nitrogen 50 (*)     Creatinine 2.35 (*)     Calcium 8.5      Glucose 95      Alkaline Phosphatase 112      AST 24      ALT 22      Protein Total 6.1 (*)     Albumin 2.6 (*)     Bilirubin Total 0.2      GFR Estimate 26 (*)    CBC WITH PLATELETS AND DIFFERENTIAL - Abnormal    WBC Count 8.5      RBC Count 1.85 (*)     Hemoglobin 5.3 (*)     Hematocrit 18.0 (*)     MCV 97      MCH 28.6      MCHC 29.4 (*)     RDW 19.4 (*)     Platelet Count 225      % Neutrophils 58      % Lymphocytes 11      % Monocytes 10      % Eosinophils 20      % Basophils 0      % Immature Granulocytes 1      NRBCs " per 100 WBC 0      Absolute Neutrophils 5.0      Absolute Lymphocytes 1.0      Absolute Monocytes 0.8      Absolute Eosinophils 1.7 (*)     Absolute Basophils 0.0      Absolute Immature Granulocytes 0.1      Absolute NRBCs 0.0     COVID-19 VIRUS (CORONAVIRUS) BY PCR - Normal    SARS CoV2 PCR Negative     TYPE AND SCREEN, ADULT    ABO/RH(D) A POS      Antibody Screen Negative      SPECIMEN EXPIRATION DATE [id number]     PREPARE RED BLOOD CELLS (UNIT)    Blood Component Type Red Blood Cells      Product Code E5214H16      Unit Status Transfused      Unit Number [id number]      CROSSMATCH Compatible      CODING SYSTEM [id number]      ISSUE DATE AND TIME [id number]      UNIT ABO/RH A+      UNIT TYPE ISBT 6200     PREPARE RED BLOOD CELLS (UNIT)    Blood Component Type Red Blood Cells      Product Code K7329M43      Unit Status Ready for issue      Unit Number [id number]      CROSSMATCH Compatible      CODING SYSTEM [id number]     PREPARE RED BLOOD CELLS (UNIT)   TRANSFUSE RED BLOOD CELLS (UNIT)   ABO/RH TYPE AND SCREEN     No orders to display       Patient's response to treatments and/or interventions:     To be done/followed up on inpatient unit:      Does this patient have any cognitive concerns?: NA    Activity level - Baseline/Home:  Unknown  Activity Level - Current:   Unknown    Patient's Preferred language: English   Needed?: No    Isolation: None  Infection: Not Applicable  Patient tested for COVID 19 prior to admission: YES  Bariatric?: No    Vital Signs:   Vitals:    08/1935 08/23/22 1945 08/23/22 2000 08/23/22 2100   BP: 135/55 132/66 131/65 100/85   Pulse: 75 75 75 79   Resp: 14 9 16 (!) 32   Temp: 97.6  F (36.4  C)      TempSrc:       SpO2: 95% 96% 96% 93%   Weight:       Height:           Cardiac Rhythm:     Was the PSS-3 completed:   Yes  What interventions are required if any?               Family Comments: given to patient  OBS brochure/video discussed/provided to  patient/family: Yes              Name of person given brochure if not patient:               Relationship to patient:     For the majority of the shift this patient's behavior was Green.   Behavioral interventions performed were NA  .    ED NURSE PHONE NUMBER: *[phone]

## 2022-08-24 NOTE — PROGRESS NOTES
RECEIVING UNIT ED HANDOFF REVIEW    ED Nurse Handoff Report was reviewed by: Michelle Johnston RN on August 23, 2022 at 10:23 PM

## 2022-08-24 NOTE — CONSULTS
[facility]  Gastroenterology Consultation         Hermilo Velasquez  [address] [id number]  89 year old male    Admission Date/Time: 8/23/2022  Primary Care Provider: Karson Bishop  Referring / Attending Physician:  Dr. Carlos Kathleen    We were asked to see the patient in consultation by Dr. Carlos Kathleen for evaluation of acute on chronic anemia.      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     He had a similar presentation in 04/2022 and underwent EGD and colonoscopy for acute on chronic anemia and EGd revealed erosive gastropathy. Colonoscopy noted angiodysplastic lesions with no bleeding. They were cauterized. Recommended to have an outpatient capsule endoscopy but canceled appt.    In addition previous EGD in 08/2020 noted non bleeding angioectasias. He had a colonoscopy in 10/2020 noted six recently bleeding colonic angiodysplastic lesions- treated with APC. One 10 mm polyp removed in sigmoid colon.     He was admitted with SBP of 93. Hemoglobin baseline is 12.2 and has decrease to 5.8 and now 6.4 after transfusion. He has had no signs of active bleeding- denies melean, hematochezia, hemoptysis, hematuria.    ROS: A comprehensive ten point review of systems was negative aside from those in mentioned in the HPI.      PAST MED HX:  I have reviewed this patient's medical history and updated it with pertinent information if needed.   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 Vasques      Aortic stenosis     Severe AS, 9/2015 AVR - [facility]  TRIFECTA Bovine bioprosthesis 25MM TF-25A     Atrial fibrillation (H)     9/2015 Paroxysmal post op Afib - discharged on Warfarin and a beta blocker     COPD (chronic obstructive pulmonary disease) (H)      Deep vein thrombosis (H)      GERD (gastroesophageal reflux disease)      Heart murmur      Monoclonal gammopathy     plasmacyte prominent causing monoclonal gammopathy     Need for SBE (subacute bacterial endocarditis) prophylaxis      Neuropathy      Other and unspecified hyperlipidemia      Other malignant lymphomas     non hodgkin's lymphoma     RBBB (right bundle branch block)      Severe sepsis with acute organ dysfunction (H) 11/16/2015     Unspecified hereditary and idiopathic peripheral neuropathy        MEDICATIONS:   Prior to Admission Medications   Prescriptions Last Dose Informant Patient Reported? Taking?   ACE/ARB/ARNI NOT PRESCRIBED (INTENTIONAL)  Self No No   Sig: Please choose reason not prescribed from choices below.   Patient not taking: No sig reported   DULoxetine (CYMBALTA) 60 MG capsule Unknown at Unknown time Self Yes Yes   Sig: Take 1 capsule daily for 7 days then take 2 capsules daily   acetaminophen (TYLENOL) 500 MG tablet Unknown at Unknown time Self Yes Yes   Sig: Take 1,000 mg by mouth 3 times daily as needed   albuterol (PROAIR HFA/PROVENTIL HFA/VENTOLIN HFA) 108 (90 Base) MCG/ACT inhaler Unknown at Unknown time Self Yes Yes   Sig: Inhale 2 puffs into the lungs every 6 hours as needed   albuterol (PROVENTIL) (2.5 MG/3ML) 0.083% neb solution Unknown at Unknown time Self No Yes   Sig: Take 1 vial (2.5 mg) by nebulization every 6 hours as needed for shortness of breath / dyspnea or wheezing   alfuzosin ER (UROXATRAL) 10 MG 24 hr tablet Unknown at Unknown time Self No Yes   Sig: Take 1 tablet (10 mg) by mouth daily   apixaban ANTICOAGULANT (ELIQUIS) 5 MG tablet Unknown at Unknown time Self No Yes   Sig: Take 1 tablet (5 mg) by mouth 2 times daily   atorvastatin (LIPITOR) 10 MG tablet  Unknown at Unknown time Self No Yes   Sig: Take 1 tablet (10 mg) by mouth daily   dimenhyDRINATE (DRAMAMINE) 50 MG tablet Unknown at Unknown time Self Yes Yes   Sig: Take 50 mg by mouth as needed   famotidine (PEPCID) 40 MG tablet Unknown at Unknown time Self Yes Yes   Sig: Take 40 mg by mouth 2 times daily   ferrous sulfate (FE TABS) 325 (65 Fe) MG EC tablet Unknown at Unknown time Self Yes Yes   Sig: Take 325 mg by mouth 2 times daily   fluticasone-vilanterol (BREO ELLIPTA) 200-25 MCG/INH inhaler Unknown at Unknown time Self Yes Yes   Sig: Inhale 1 puff into the lungs daily   gabapentin (NEURONTIN) 300 MG capsule Unknown at Unknown time Self Yes Yes   Sig: Take 600 mg by mouth At Bedtime (2 x 300 mg = 600 mg)   metoprolol succinate ER (TOPROL XL) 25 MG 24 hr tablet Unknown at Unknown time Self No Yes   Sig: Take 0.5 tablets (12.5 mg) by mouth 2 times daily ; Hold for SBP<100 or HR<55.   nystatin (MYCOSTATIN) 410734 UNIT/GM external powder Unknown at Unknown time Self Yes Yes   Sig: Apply to groin/abdomen folds topically two times a day for  redness until healed then D/C   ondansetron (ZOFRAN ODT) 4 MG ODT tab Unknown at Unknown time Self No Yes   Sig: Take 1 tablet (4 mg) by mouth every 8 hours as needed for nausea Dissolve ON the tongue.   polyethylene glycol (MIRALAX) 17 g packet Unknown at Unknown time Self No Yes   Sig: Take 17 g by mouth daily   senna-docusate (SENOKOT-S/PERICOLACE) 8.6-50 MG tablet Unknown at Unknown time Self No Yes   Sig: Take 1 tablet by mouth 2 times daily as needed for constipation Take while on oral narcotics to prevent or treat constipation.   tiotropium (SPIRIVA) 18 MCG inhaled capsule Unknown at Unknown time Self Yes Yes   Sig: Inhale 18 mcg into the lungs daily   torsemide (DEMADEX) 10 MG tablet  Self Yes Yes   Sig: Take 10 mg by mouth daily   triamcinolone (KENALOG) 0.1 % external cream Unknown at Unknown time Self Yes Yes   Sig: Apply topically 2 times daily as needed for  "irritation      Facility-Administered Medications: None       ALLERGIES:   Allergies   Allergen Reactions     Omeprazole Itching     Pantoprazole Itching     Prevacid [Lansoprazole] Itching     Lasix [Furosemide] Rash     Lidocaine Blisters and Rash     Allergy to lidocaine ointment  Allergy to lidocaine ointment       Penicillin G Rash     Penicillins Rash     \"broke out from injection\" 60 yrs ago  Tolerates cephalosporins       SOCIAL HISTORY:  Social History     Tobacco Use     Smoking status: Former Smoker     Packs/day: 2.00     Years: 55.00     Pack years: 110.00     Quit date: 1998     Years since quittin.6     Smokeless tobacco: Never Used   Vaping Use     Vaping Use: Never used   Substance Use Topics     Alcohol use: Not Currently     Drug use: No       FAMILY HISTORY:  Family History   Problem Relation Age of Onset     C.A.D. Father      Emphysema Father      Melanoma No family hx of      Skin Cancer No family hx of        PHYSICAL EXAM:   General  Alert, oriented and comfortable  Vital Signs with Ranges  Temp: 98  F (36.7  C) Temp src: Oral BP: 117/57 Pulse: 78   Resp: 16 SpO2: 95 % O2 Device: Nasal cannula Oxygen Delivery: 2 LPM  No intake/output data recorded.    Constitutional: healthy, alert and no distress   Cardiovascular: negative, PMI normal. No lifts, heaves, or thrills. RRR. No murmurs, clicks gallops or rub  Respiratory: negative, Percussion normal. Good diaphragmatic excursion. Lungs clear  Abdomen: Abdomen soft, non-tender. BS normal. No masses, organomegaly          ADDITIONAL COMMENTS:   I reviewed the patient's new clinical lab test results.   Recent Labs   Lab Test 22  0411 22  1712 22  1426 22  1643 22  0720 22  2336 22  1223 10/24/20  0037 10/23/20  1617   WBC  --  8.5  --  12.9* 15.1*   < > 10.7   < > 8.0   HGB 6.4* 5.3* 5.7* 9.5* 9.9*   < > 11.2*   < > 6.6*   MCV  --  97  --  89 88   < > 88   < > 89   PLT  --  225  --  150 151   < > " 244   < > 279   INR  --  1.65*  --   --   --   --  1.99*  --  1.39*    < > = values in this interval not displayed.     Recent Labs   Lab Test 08/24/22  0411 08/23/22  1712 07/30/22  0604   POTASSIUM 4.0 3.9 3.5   CHLORIDE 114* 112* 107   CO2 24 24 28   BUN 45* 50* 23   ANIONGAP 5 6 3     Recent Labs   Lab Test 08/23/22  1712 08/23/22  1322 05/08/22  1501 04/11/22  0744 04/10/22  2236 04/10/22  2034 03/22/20  1950 03/22/20  1914   ALBUMIN 2.6*  --   --  2.2*  --  2.6*   < > 2.6*   BILITOTAL 0.2  --   --  0.3  --  0.5   < > 0.6   ALT 22  --   --  10  --  10   < > 27   AST 24  --   --  17  --  11   < > 101*   PROTEIN  --  Negative 50 *  --  30 *  --    < >  --    LIPASE  --   --   --   --   --   --   --  54*    < > = values in this interval not displayed.       I reviewed the patient's new imaging results.        CONSULTATION ASSESSMENT AND PLAN:    Hermilo Olson is a 89 year old male with acute on chronic anemia, admitted 8/23/22 with acute on chronic anemia.     Acute on chronic anemia  Prior GI bleed due to AVMs  Chronically anticoagulated on Eliquis  Hemoglobin baseline is 12.2 and has decrease to 5.8 and now 6.4  He has had no signs of active bleeding.   EGD in 04/2022 revealed erosive gastropathy.   6/2022 Colonoscopy noted angiodysplastic lesions with no bleeding. They were cauterized. Recommended to have an outpatient capsule endoscopy but canceled appt.  EGD in 08/2020 noted non bleeding angioectasias.   Colonoscopy 10/2020 noted six recently bleeding colonic angiodysplastic lesions- treated with APC. One 10 mm polyp removed in sigmoid colon.   Has been anticoagulated on apixiban and currently held  Cause of anemia may be multifactorial, vs slow upper or lower GI bleed  I have discussed with patients spouse, Roberta, labs findings and recommendations for EGD for anemia     -- clear liquid diet  -- EGD and colonoscopy tomorrow  -- NPO after midnight  -- would start IV pantoprazole 40 mg BID but allergic per  records  -- Serial hemoglobin and transfuse for hemoglobin of 7 or less  -- Hold apixiban      [facility].  Office: [phone]  Cell : [phone] (Dr. Florentino)  Cell: [phone] (Sammie Cox PA-C)

## 2022-08-25 ENCOUNTER — MEDICAL CORRESPONDENCE (OUTPATIENT)
Dept: HEALTH INFORMATION MANAGEMENT | Facility: CLINIC | Age: 87
End: 2022-08-25

## 2022-08-25 LAB
ANION GAP SERPL CALCULATED.3IONS-SCNC: 7 MMOL/L (ref 3–14)
BUN SERPL-MCNC: 36 MG/DL (ref 7–30)
CALCIUM SERPL-MCNC: 8.4 MG/DL (ref 8.5–10.1)
CHLORIDE BLD-SCNC: 119 MMOL/L (ref 94–109)
CO2 SERPL-SCNC: 20 MMOL/L (ref 20–32)
CREAT SERPL-MCNC: 1.99 MG/DL (ref 0.66–1.25)
GFR SERPL CREATININE-BSD FRML MDRD: 32 ML/MIN/1.73M2
GLUCOSE BLD-MCNC: 85 MG/DL (ref 70–99)
HGB BLD-MCNC: 7.8 G/DL (ref 13.3–17.7)
HGB BLD-MCNC: 8 G/DL (ref 13.3–17.7)
HGB BLD-MCNC: 8.2 G/DL (ref 13.3–17.7)
POTASSIUM BLD-SCNC: 4 MMOL/L (ref 3.4–5.3)
SODIUM SERPL-SCNC: 146 MMOL/L (ref 133–144)

## 2022-08-25 PROCEDURE — 85018 HEMOGLOBIN: CPT | Performed by: INTERNAL MEDICINE

## 2022-08-25 PROCEDURE — 99232 SBSQ HOSP IP/OBS MODERATE 35: CPT | Performed by: INTERNAL MEDICINE

## 2022-08-25 PROCEDURE — 250N000011 HC RX IP 250 OP 636: Performed by: NURSE PRACTITIONER

## 2022-08-25 PROCEDURE — P9016 RBC LEUKOCYTES REDUCED: HCPCS | Performed by: INTERNAL MEDICINE

## 2022-08-25 PROCEDURE — 250N000013 HC RX MED GY IP 250 OP 250 PS 637: Performed by: INTERNAL MEDICINE

## 2022-08-25 PROCEDURE — 120N000001 HC R&B MED SURG/OB

## 2022-08-25 PROCEDURE — 36415 COLL VENOUS BLD VENIPUNCTURE: CPT | Performed by: INTERNAL MEDICINE

## 2022-08-25 PROCEDURE — 80048 BASIC METABOLIC PNL TOTAL CA: CPT | Performed by: INTERNAL MEDICINE

## 2022-08-25 PROCEDURE — 250N000009 HC RX 250

## 2022-08-25 PROCEDURE — 258N000003 HC RX IP 258 OP 636: Performed by: INTERNAL MEDICINE

## 2022-08-25 RX ORDER — QUETIAPINE FUMARATE 25 MG/1
25 TABLET, FILM COATED ORAL EVERY 6 HOURS PRN
Status: DISCONTINUED | OUTPATIENT
Start: 2022-08-25 | End: 2022-09-08 | Stop reason: HOSPADM

## 2022-08-25 RX ORDER — NALOXONE HYDROCHLORIDE 0.4 MG/ML
0.2 INJECTION, SOLUTION INTRAMUSCULAR; INTRAVENOUS; SUBCUTANEOUS
Status: DISCONTINUED | OUTPATIENT
Start: 2022-08-25 | End: 2022-08-25 | Stop reason: CLARIF

## 2022-08-25 RX ORDER — QUETIAPINE FUMARATE 25 MG/1
25 TABLET, FILM COATED ORAL AT BEDTIME
Status: DISCONTINUED | OUTPATIENT
Start: 2022-08-25 | End: 2022-08-26

## 2022-08-25 RX ORDER — OLANZAPINE 10 MG/2ML
2.5 INJECTION, POWDER, FOR SOLUTION INTRAMUSCULAR 2 TIMES DAILY PRN
Status: DISCONTINUED | OUTPATIENT
Start: 2022-08-25 | End: 2022-08-25

## 2022-08-25 RX ORDER — OXYCODONE HYDROCHLORIDE 5 MG/1
5 TABLET ORAL EVERY 4 HOURS PRN
Status: DISCONTINUED | OUTPATIENT
Start: 2022-08-25 | End: 2022-08-25 | Stop reason: CLARIF

## 2022-08-25 RX ORDER — OLANZAPINE 10 MG/2ML
5 INJECTION, POWDER, FOR SOLUTION INTRAMUSCULAR
Status: COMPLETED | OUTPATIENT
Start: 2022-08-25 | End: 2022-08-26

## 2022-08-25 RX ORDER — NALOXONE HYDROCHLORIDE 0.4 MG/ML
0.4 INJECTION, SOLUTION INTRAMUSCULAR; INTRAVENOUS; SUBCUTANEOUS
Status: DISCONTINUED | OUTPATIENT
Start: 2022-08-25 | End: 2022-08-25 | Stop reason: CLARIF

## 2022-08-25 RX ORDER — WATER 10 ML/10ML
INJECTION INTRAMUSCULAR; INTRAVENOUS; SUBCUTANEOUS
Status: COMPLETED
Start: 2022-08-25 | End: 2022-08-25

## 2022-08-25 RX ORDER — OLANZAPINE 5 MG/1
5 TABLET, ORALLY DISINTEGRATING ORAL 2 TIMES DAILY PRN
Status: DISCONTINUED | OUTPATIENT
Start: 2022-08-25 | End: 2022-08-25

## 2022-08-25 RX ADMIN — WATER: 1 INJECTION INTRAMUSCULAR; INTRAVENOUS; SUBCUTANEOUS at 00:39

## 2022-08-25 RX ADMIN — Medication 12.5 MG: at 21:12

## 2022-08-25 RX ADMIN — QUETIAPINE FUMARATE 25 MG: 25 TABLET ORAL at 21:12

## 2022-08-25 RX ADMIN — WATER 10 ML: 1 INJECTION INTRAMUSCULAR; INTRAVENOUS; SUBCUTANEOUS at 06:16

## 2022-08-25 RX ADMIN — OLANZAPINE 2.5 MG: 10 INJECTION, POWDER, FOR SOLUTION INTRAMUSCULAR at 00:13

## 2022-08-25 RX ADMIN — SODIUM CHLORIDE: 9 INJECTION, SOLUTION INTRAVENOUS at 11:59

## 2022-08-25 RX ADMIN — OLANZAPINE 2.5 MG: 10 INJECTION, POWDER, FOR SOLUTION INTRAMUSCULAR at 06:16

## 2022-08-25 RX ADMIN — GABAPENTIN 300 MG: 300 CAPSULE ORAL at 21:12

## 2022-08-25 RX ADMIN — QUETIAPINE FUMARATE 25 MG: 25 TABLET ORAL at 23:29

## 2022-08-25 ASSESSMENT — ACTIVITIES OF DAILY LIVING (ADL)
ADLS_ACUITY_SCORE: 29
ADLS_ACUITY_SCORE: 37
ADLS_ACUITY_SCORE: 30
ADLS_ACUITY_SCORE: 30
ADLS_ACUITY_SCORE: 39
ADLS_ACUITY_SCORE: 30
ADLS_ACUITY_SCORE: 30
ADLS_ACUITY_SCORE: 39
ADLS_ACUITY_SCORE: 30
ADLS_ACUITY_SCORE: 42
ADLS_ACUITY_SCORE: 37
ADLS_ACUITY_SCORE: 39

## 2022-08-25 NOTE — CODE/RAPID RESPONSE
[facility]    House OZZY CODE 21/Restraint check  8/24/2022   Time Called: 2241     Assessment & Plan     Acute agitation, aggression in setting of PMH cognitive impairment.     Was initially contacted by nursing at 2219 noting pt refusing vital signs, bowel prep and blood transfusion, does not believe pt of sound mind, requesting further recommendations.  Nursing notes pt's wife, Roberta, was also called regarding pt refusing plan of care, and wife notes to continue to encourage pt to comply.  At time of arrival, pt lying in bed, awake, alert, restless, in no overt distress.  When conversing with pt, pt makes comments or statements; however, does not appropriately answer questions or have appropriate, sensical responses.  Pt not able to appropriately understand reason for hospitalization or need for blood transfusion or bowel prep.  Pt's daughter had attempted to call pt while present in room; however, when pt was offered the phone, pt did not understand to hold the phone to his ear and subsequently became agitated with the phone, attempting to swing it at staff.  Had left pt's room at that time to contact pt's daughter, Deloris.  While on the phone with pt's daughter, pt subsequently set off the bed alarm with nursing noting pt sitting on the edge of bed demanding to leave prompting CODE 21 at that time.      Had extensive conversation with Deloris, pt's daughter, regarding pt refusing treatment; however, remains FULL CODE . Discussed with Deloris that pt appears adamant to be left alone; however, in setting of pt remaining FULL CODE, pt would require restraints to proceed with PRBC.  Had also discussed option of NGT for bowel prep administration with Deloris.  Shared with Deloris that she needs to have a difficult conversation with Roberta, pt's wife and Deloris's mother, regarding what is best for the pt.  In setting of pt adamant to be left alone, concerned that restraints and NGT with bowel prep  could/would contribute to possible pain and suffering and would this be in line of pt's goals of care.  Discussed with Deloris that pt is not of sound mind, not able to make appropriate medical decisions; therefore, family will need to make decisions for pt that are in line with his wishes.  Deloris wished to talk with her dad prior to deciding decisions.      Presented to pt's bedside with Deloris on speaker phone; however, pt remains verbally agitated, attempting to leave the room while ambulating with his walker out of the bathroom.  Pt able to talk with Deloris; however, pt easily distratcted, not able to focus on conversation with Deloris, does not appear to understand next steps in plan of care.  At this time, Deloris has requested for pt to be restrained, to receive PRBC transfusion and to hold off on NGT and bowel prep at this time.  Discussed with Deloris she needs to have an honest discussion with pt's wife, Roberta, regarding goals of care as pt remains FULL CODE, restorative cares at this time.      INTERVENTIONS:  - Extensive goals of care conversation with pt's daughter, Deloris  - 2-point soft wrist restraints  - Fall precautions  - Pt does not require formal 72 hour legal hold to ensure pt remains in the hospital as pt has cognitive impairment at baseline, not able to make personal medical decisions    Addendum: 2359: Paged by nursing as pt agitated, attempting to spit and bite while taking vitals, inquiring about chemical restraints.  Had initially ordered IV haldol as no Parkinson's history; however, after review of QT, prolonged at this time.  Will order zyprexa 2.5 mg IM BID PRN, OK to administer now.    Discussed with and defer further cares to nursing and hospitalist    Interval History     Hermilo Velasquez is a 89 year old male who was admitted on 8/23/2022 for acute anemia suspected due to GIB.    Medical history significant for: Colonic AVMs, chronic a-fib and recurrent PE on chronic AC, aortic stenosis s/p  "TAVR, COPD, cognitive impairment    Code Status: Full Code    Edward Lawrenceyd, ILYA CNP   House OZZY    Allergies   Allergies   Allergen Reactions     Omeprazole Itching     Pantoprazole Itching     Prevacid [Lansoprazole] Itching     Lasix [Furosemide] Rash     Lidocaine Blisters and Rash     Allergy to lidocaine ointment  Allergy to lidocaine ointment       Penicillin G Rash     Penicillins Rash     \"broke out from injection\" 60 yrs ago  Tolerates cephalosporins       Physical Exam   Vital Signs with Ranges:  Temp:  [97.6  F (36.4  C)-98.6  F (37  C)] 98  F (36.7  C)  Pulse:  [71-79] 79  Resp:  [15-18] 18  BP: (105-129)/(50-61) 105/50  SpO2:  [95 %-98 %] 96 %  I/O last 3 completed shifts:  In: 570 [P.O.:360; I.V.:210]  Out: -     Constitutional: Pt lying in bed, awake, alert, restless, in no overt distress  Neck: No upper airway wheezes or stridor noted  Pulmonary: In no apparent respiratory distress  Cardiovascular: Appears well perfused  GI: Round  Skin/Integumen: Warm, dry, pink  Neuro: Awake, alert, moving all extremities, no obvious facial asymmetry, clear speech, does not appropriately answer questions or have appropriate, sensical responses  Psych:  Restless, agitated  Extremities: Noted surgical dressing R anterior knee      Time Spent on this Encounter   I spent 30 minutes on the unit/floor managing the care of Hermilo MADRID Leakevin. Over 50% of my time was spent counseling the patient and/or coordinating care regarding services listed in this note.      "

## 2022-08-25 NOTE — PROGRESS NOTES
Assumed care for the patient at 0720.     Pt was on 5 points hard restraints including chest restraints. Chest restraint released immediately. No orders for hard restraints found.     No black box with the keys found in the room to release hard restraints. [facility] called for keys to release hard restraints.   Hard points restraints released by .     ROM performed to all 4 extremities and changed to soft restraints. Pt still very restless, confused, agitated, attempting to kick staff with legs and release restraints with left hand.     1:1 sitter at bedside for safety.     Current order is 2 points soft restraints only. Dr Malagon paged for 4 points soft restraints.

## 2022-08-25 NOTE — PROGRESS NOTES
"Pt has been refusing bedside RN taking vital signs and is refusing to drink bowel prep for colonoscopy tomorrow. Also refusing to get blood transfusion for low hgb. Attempted to speak with patient as to why he is refusing but pt simply responds \"Because I said no\" and refuses to elaborate as to why he is saying no.   Updated on-call hospitalist but have not received a response.  I also updated pt's wife, Roberta who described similar difficulties during his last hospitalization. I encouraged pt to speak with Roberta, over the phone in hopes that he may be agreeable after talking to her, but he also refused to speak to her saying \"I have nothing to say to her\".   "

## 2022-08-25 NOTE — PLAN OF CARE
Pt is A&OX1-2. Pt is AX1 w/walker and gait belt. Pt is incontinent of bowel and bladder. Pt refused to drink his bowel prep for colonoscopy scheduled for tomorrow. He refused to get vitals done in preparation of blood transfusion. Charge nurse informed and he also tried to talk to pt but he refused. Pt got violent and code 21 was called. Hospitalist tried to have daughter talk to him on phone but he would still not cooperate. Manual restraints ordered for safety purposes and blood transfusion. Will continue to monitor.

## 2022-08-25 NOTE — PROGRESS NOTES
[facility]  Gastroenterology Progress Note     Hermilo Velasquez MRN# [id number]   YOB: 1932 Age: 89 year old          Assessment and Plan:   Hermilo Olson is a 89 year old male with acute on chronic anemia, admitted 8/23/22 with acute on chronic anemia.     Acute on chronic anemia  Prior GI bleed due to AVMs  Chronically anticoagulated on Eliquis  Hemoglobin baseline is 12.2 and has decrease to 5.8 and now 6.4  He has had no signs of active bleeding.   EGD in 04/2022 revealed erosive gastropathy.   6/2022 Colonoscopy noted angiodysplastic lesions with no bleeding. They were cauterized. Recommended to have an outpatient capsule endoscopy but canceled appt.  EGD in 08/2020 noted non bleeding angioectasias.   Colonoscopy 10/2020 noted six recently bleeding colonic angiodysplastic lesions- treated with APC. One 10 mm polyp removed in sigmoid colon.   Has been anticoagulated on apixiban and currently held  Cause of anemia may be multifactorial, vs slow upper or lower GI bleed  I have discussed with patients spouse, Roberta, labs findings and recommendations for EGD for anemia. She is in agreement but will discuss with her daughter.     -- Regular diet  -- canceled EGD and colonoscopy, will consider EGD only tomorrow  -- NPO after midnight  -- would start IV pantoprazole 40 mg BID but allergic per records- continue famotidine  -- Serial hemoglobin and transfuse for hemoglobin of 7 or less  -- Hold apixiban                Interval History:   Patient became delirious, confused and combative yesterday requiring intervention with restraints. Never did colonoscopy. Patient remains agitated and confused.              Review of Systems:   C: NEGATIVE for fever, chills, change in weight  E/M: NEGATIVE for ear, mouth and throat problems  R: NEGATIVE for significant cough or SOB  CV: NEGATIVE for chest pain, palpitations or peripheral edema             Medications:   I have reviewed this  patient's current medications    alfuzosin ER  10 mg Oral Daily     atorvastatin  10 mg Oral Daily     famotidine  40 mg Oral Q48H     fluticasone-vilanterol  1 puff Inhalation Daily     gabapentin  300 mg Oral At Bedtime     metoprolol succinate ER  12.5 mg Oral BID     sodium chloride (PF)  3 mL Intracatheter Q8H     umeclidinium  1 puff Inhalation Daily                  Physical Exam:   Vitals were reviewed  Vital Signs with Ranges  Temp:  [97.6  F (36.4  C)-98.6  F (37  C)] 98.1  F (36.7  C)  Pulse:  [76-92] 85  Resp:  [14-18] 16  BP: ()/(39-58) 123/58  SpO2:  [85 %-98 %] 95 %  I/O last 3 completed shifts:  In: 1004 [P.O.:360; I.V.:210]  Out: -   Constitutional: healthy, alert, severe distress and pale, agitated  Cardiovascular: negative, PMI normal. No lifts, heaves, or thrills. RRR. No murmurs, clicks gallops or rub  Respiratory: negative, Percussion normal. Good diaphragmatic excursion. Lungs clear  Abdomen: Abdomen soft, non-tender. BS normal. No masses, organomegaly           Data:   I reviewed the patient's new clinical lab test results.   Recent Labs   Lab Test 08/24/22  1817 08/24/22  0411 08/23/22  1712 08/23/22  1426 07/28/22  1643 07/28/22  0720 05/08/22  2336 05/08/22  1223 10/24/20  0037 10/23/20  1617   WBC  --   --  8.5  --  12.9* 15.1*   < > 10.7   < > 8.0   HGB 7.0* 6.4* 5.3*   < > 9.5* 9.9*   < > 11.2*   < > 6.6*   MCV  --   --  97  --  89 88   < > 88   < > 89   PLT  --   --  225  --  150 151   < > 244   < > 279   INR  --   --  1.65*  --   --   --   --  1.99*  --  1.39*    < > = values in this interval not displayed.     Recent Labs   Lab Test 08/24/22  0411 08/23/22  1712 08/23/22  1426 07/30/22  0604   POTASSIUM 4.0 3.9  --  3.5   CHLORIDE 114* 112*  --  107   CO2 24 24  --  28   BUN 45* 50* 49* 23   ANIONGAP 5 6  --  3     Recent Labs   Lab Test 08/23/22  1712 08/23/22  1322 05/08/22  1501 04/11/22  0744 04/10/22  2236 04/10/22  2034 03/22/20  1950 03/22/20  1914   ALBUMIN 2.6*  --    --  2.2*  --  2.6*   < > 2.6*   BILITOTAL 0.2  --   --  0.3  --  0.5   < > 0.6   ALT 22  --   --  10  --  10   < > 27   AST 24  --   --  17  --  11   < > 101*   PROTEIN  --  Negative 50 *  --  30 *  --    < >  --    LIPASE  --   --   --   --   --   --   --  54*    < > = values in this interval not displayed.       I reviewed the patient's new imaging results.    All laboratory data reviewed  All imaging studies reviewed by me.    Sammie Cox PA-C,  8/25/2022  [facility]  Office : [phone]  Cell: [phone] (Dr. Florentino)  Cell: [phone] (Sammie Cox PA-C)

## 2022-08-25 NOTE — PROGRESS NOTES
Pt disorientedx4, very confused hostile and combative. Repeated attempts to redirect patient and soft restraints initiated at 2300 so that a blood transfusion could be initiated for hemoglobin 7.0.  Zyprexa given x2 during shift, first dose initially effective before pt grew restless and agitated again and eventually pt managed to get out of soft wrist restraint.  Pt had a bowel movement at this time and was combative when staff attempted to assist. Pt kicked writer in head while writer attempted to secure soft wrist restraint with charge nurse in room attempting to help.  Security called, second dose of zyprexa given when agitation escalated while security present. Charge nurse sent communication to provider to request restraint order. Security applied hard restraints due to continued agitation and patient attempt to hit, kick, and bite staff despite attempts to verbally redirect and reorient patient. Pt was placed in 5 point hard restraint after being cleaned up and clean brief applied and remains stable but remains agitated and not redirectable. Frequent verbal attempts throughout shift to reorient pt and remind him that he is safe at the hospital were unsuccessful. Follow up page sent to hospitalist requesting direction with no callback yet received.

## 2022-08-25 NOTE — PROGRESS NOTES
[facility]  Hospitalist Progress Note  En Malagon MD  08/25/2022    Assessment & Plan   Hermilo Velasquez is a 89 year old male with hx of known colonic AVMs, chronic a-fib and recurrent PE on chronic AC with apixaban, aortic stenosis s/p TAVR, COPD, and cognitive impairment who was admitted on 8/23/2022 for acute anemia due to suspected GI bleed     Acute blood loss on chronic anemia due to suspected GI bleed  Hx colonic AVMs  * Referred to ED after routine blood work revealed a Hgb 5.8  * No obvious history of GI bleed however patient has history of AVMs in his colon which was treated with APC on 4/12/2022; also on on chronic AC with apixaban  * Transfused 1u pRBCs in the ED  * [facility] consulted on admission  - Received additional 1u pRBCs earlier this morning, 8/24  - Follow Hgb q6h today. Conditional unit ordered for Hgb 7 or less  - EGD and colonoscopy tomorrow, 8/26  - Holding PTA apixaban  - Not on PPI due to allergy  - Continues on PTA famotidine 40 mg BID  - [facility] following, appreciate assistance, hold on EGD until less agitated     FRED on CKD stage IIIa, Improved  * Creatinine up to 2.35 from 0.87 on 7/30/22, although creatinine has previously fluctuated in 1.6-1.9 range  * Suspected pre-renal state due to blood loss, borderline hypotension (noted on arrival), home torsemide, and recent use of Bactrim for UTI  * Improved with IV fluids and blood transfusion  - Creatinine 2.17 >> 2  - SL IVF, low threshold to d/c for resp distress  - Holding PTA torsemide     Chronic atrial fibrillation  Hx of pulmonary embolism  - Continues on PTA metoprolol 12.5 mg BID  - Holding PTA apixaban in setting of suspected GI bleed  - Initiated discussion regarding risks/benefits of anticoagulation with patient's wife. Conversation with patient limited due to apparent cognitive impairment. Patients wife agrees on holding AC for the time being, but would like to resume conversation re:  "long term AC with patient's cardiologist, who he has an appointment with on Fri.      Hx aortic stenosis s/p TAVR  * TTE (8/22): EF 50-55%, unable to evaluate WMA due to limited study; mean AV gradient 14 mmHg  - Appears compensated without signs of volume overload  - Low threshold to d/c IV fluids for resp distress     Peripheral neuropathy  - PTA gabapentin has been decreased from 600 to 300 mg qhs in setting of FRED     Cognitive impairment  Delirium  * Patient lives with his wife who is his primary caregiver. Also has home care.  - PT/OT consults requested  - sitter  - change from zyprexa to seroquel prn and at bedtime  - UA negative     Otherwise chronic and stable medical conditions  Hx of right TKA on 7/25/2022  COPD  Dyslipidemia  Hx of lung cancer s/p wedge resection (2019)  Hx of non-Hodgkin's Lymphoma, in remission  MGUS  BPH        Diet: Clear Liquid Diet    DVT Prophylaxis: Pneumatic Compression Devices  Suazo Catheter: Not present  Code Status: Full Code         Disposition: Expected discharge once EGD/colonoscopy complete, and Hgb stable, ?Fri. Patient has Cardiology appt on Fri. Unclear if he will be able to make this appt; may need to reschedule     The patient's care was discussed with the Bedside Nurse, Patient and Patient's Family.       Interval History   -- chart reviewed  -- noted severe agitation needing 4 point restraints  -- he is more calm now    -Data reviewed today: I reviewed all new labs and imaging over the last 24 hours. I personally reviewed no images or EKG's today.    Physical Exam    , Blood pressure (!) 144/70, pulse 85, temperature 98.6  F (37  C), temperature source Oral, resp. rate 16, height 1.727 m (5' 8\"), weight 74.8 kg (165 lb), SpO2 92 %.  Vitals:    08/23/22 1634   Weight: 74.8 kg (165 lb)     Vital Signs with Ranges  Temp:  [97.9  F (36.6  C)-98.6  F (37  C)] 98.6  F (37  C)  Pulse:  [76-92] 85  Resp:  [14-18] 16  BP: ()/(39-70) 144/70  SpO2:  [85 %-96 %] 92 " %  I/O's Last 24 hours  I/O last 3 completed shifts:  In: 1220 [P.O.:280; I.V.:506]  Out: -     Constitutional: In restraints,not agitated now  Respiratory: Clear to auscultation bilaterally, no crackles or wheezing  Cardiovascular: Regular rate and rhythm, normal S1 and S2  GI: Normal bowel sounds, soft, non-distended, non-tender  Skin/Integumen: No rashes, no cyanosis, no edema  Other:       Medications   All medications were reviewed.    sodium chloride 100 mL/hr at 08/25/22 1159       alfuzosin ER  10 mg Oral Daily     atorvastatin  10 mg Oral Daily     famotidine  40 mg Oral Q48H     fluticasone-vilanterol  1 puff Inhalation Daily     gabapentin  300 mg Oral At Bedtime     metoprolol succinate ER  12.5 mg Oral BID     QUEtiapine  25 mg Oral At Bedtime     sodium chloride (PF)  3 mL Intracatheter Q8H     umeclidinium  1 puff Inhalation Daily        Data   Recent Labs   Lab 08/25/22  1124 08/25/22  1100 08/24/22  1817 08/24/22  0411 08/23/22  1712   WBC  --   --   --   --  8.5   HGB 8.0* 7.8* 7.0* 6.4* 5.3*   MCV  --   --   --   --  97   PLT  --   --   --   --  225   INR  --   --   --   --  1.65*   NA  --  146*  --  143 142   POTASSIUM  --  4.0  --  4.0 3.9   CHLORIDE  --  119*  --  114* 112*   CO2  --  20  --  24 24   BUN  --  36*  --  45* 50*   CR  --  1.99*  --  2.17* 2.35*   ANIONGAP  --  7  --  5 6   AMRITA  --  8.4*  --  8.4* 8.5   GLC  --  85  --  96 95   ALBUMIN  --   --   --   --  2.6*   PROTTOTAL  --   --   --   --  6.1*   BILITOTAL  --   --   --   --  0.2   ALKPHOS  --   --   --   --  112   ALT  --   --   --   --  22   AST  --   --   --   --  24       No results found for this or any previous visit (from the past 24 hour(s)).    En Malagon MD  Text Page  (7am to 6pm)

## 2022-08-26 LAB
ANION GAP SERPL CALCULATED.3IONS-SCNC: 3 MMOL/L (ref 3–14)
BUN SERPL-MCNC: 31 MG/DL (ref 7–30)
CALCIUM SERPL-MCNC: 8.6 MG/DL (ref 8.5–10.1)
CHLORIDE BLD-SCNC: 118 MMOL/L (ref 94–109)
CO2 SERPL-SCNC: 23 MMOL/L (ref 20–32)
CREAT SERPL-MCNC: 1.91 MG/DL (ref 0.66–1.25)
ERYTHROCYTE [DISTWIDTH] IN BLOOD BY AUTOMATED COUNT: 17.9 % (ref 10–15)
GFR SERPL CREATININE-BSD FRML MDRD: 33 ML/MIN/1.73M2
GLUCOSE BLD-MCNC: 94 MG/DL (ref 70–99)
HCT VFR BLD AUTO: 26.3 % (ref 40–53)
HGB BLD-MCNC: 8 G/DL (ref 13.3–17.7)
HGB BLD-MCNC: 8 G/DL (ref 13.3–17.7)
HGB BLD-MCNC: 8.1 G/DL (ref 13.3–17.7)
HGB BLD-MCNC: 9.1 G/DL (ref 13.3–17.7)
HGB BLD-MCNC: 9.3 G/DL (ref 13.3–17.7)
MCH RBC QN AUTO: 29.5 PG (ref 26.5–33)
MCHC RBC AUTO-ENTMCNC: 30.4 G/DL (ref 31.5–36.5)
MCV RBC AUTO: 97 FL (ref 78–100)
PLATELET # BLD AUTO: 186 10E3/UL (ref 150–450)
POTASSIUM BLD-SCNC: 4 MMOL/L (ref 3.4–5.3)
RBC # BLD AUTO: 2.71 10E6/UL (ref 4.4–5.9)
SODIUM SERPL-SCNC: 144 MMOL/L (ref 133–144)
UPPER GI ENDOSCOPY: NORMAL
WBC # BLD AUTO: 7.4 10E3/UL (ref 4–11)

## 2022-08-26 PROCEDURE — 82310 ASSAY OF CALCIUM: CPT | Performed by: INTERNAL MEDICINE

## 2022-08-26 PROCEDURE — G0500 MOD SEDAT ENDO SERVICE >5YRS: HCPCS | Performed by: INTERNAL MEDICINE

## 2022-08-26 PROCEDURE — 0DJ08ZZ INSPECTION OF UPPER INTESTINAL TRACT, VIA NATURAL OR ARTIFICIAL OPENING ENDOSCOPIC: ICD-10-PCS | Performed by: INTERNAL MEDICINE

## 2022-08-26 PROCEDURE — 85027 COMPLETE CBC AUTOMATED: CPT | Performed by: INTERNAL MEDICINE

## 2022-08-26 PROCEDURE — 36415 COLL VENOUS BLD VENIPUNCTURE: CPT | Performed by: INTERNAL MEDICINE

## 2022-08-26 PROCEDURE — 250N000009 HC RX 250

## 2022-08-26 PROCEDURE — 250N000009 HC RX 250: Performed by: INTERNAL MEDICINE

## 2022-08-26 PROCEDURE — 250N000011 HC RX IP 250 OP 636

## 2022-08-26 PROCEDURE — 250N000011 HC RX IP 250 OP 636: Performed by: INTERNAL MEDICINE

## 2022-08-26 PROCEDURE — 99233 SBSQ HOSP IP/OBS HIGH 50: CPT | Performed by: INTERNAL MEDICINE

## 2022-08-26 PROCEDURE — 43235 EGD DIAGNOSTIC BRUSH WASH: CPT | Performed by: INTERNAL MEDICINE

## 2022-08-26 PROCEDURE — 120N000001 HC R&B MED SURG/OB

## 2022-08-26 PROCEDURE — 250N000013 HC RX MED GY IP 250 OP 250 PS 637: Performed by: INTERNAL MEDICINE

## 2022-08-26 PROCEDURE — 85018 HEMOGLOBIN: CPT | Performed by: INTERNAL MEDICINE

## 2022-08-26 RX ORDER — QUETIAPINE FUMARATE 25 MG/1
50 TABLET, FILM COATED ORAL AT BEDTIME
Status: DISCONTINUED | OUTPATIENT
Start: 2022-08-26 | End: 2022-08-31

## 2022-08-26 RX ORDER — FENTANYL CITRATE 50 UG/ML
INJECTION, SOLUTION INTRAMUSCULAR; INTRAVENOUS PRN
Status: DISCONTINUED | OUTPATIENT
Start: 2022-08-26 | End: 2022-08-26

## 2022-08-26 RX ORDER — WATER 10 ML/10ML
INJECTION INTRAMUSCULAR; INTRAVENOUS; SUBCUTANEOUS
Status: COMPLETED
Start: 2022-08-26 | End: 2022-08-26

## 2022-08-26 RX ADMIN — TOPICAL ANESTHETIC 1 SPRAY: 200 SPRAY DENTAL; PERIODONTAL at 11:01

## 2022-08-26 RX ADMIN — Medication 12.5 MG: at 21:18

## 2022-08-26 RX ADMIN — OLANZAPINE 5 MG: 10 INJECTION, POWDER, FOR SOLUTION INTRAMUSCULAR at 00:19

## 2022-08-26 RX ADMIN — FENTANYL CITRATE 25 MCG: 50 INJECTION, SOLUTION INTRAMUSCULAR; INTRAVENOUS at 11:00

## 2022-08-26 RX ADMIN — MIDAZOLAM 2 MG: 1 INJECTION INTRAMUSCULAR; INTRAVENOUS at 11:00

## 2022-08-26 RX ADMIN — WATER 10 ML: 1 INJECTION INTRAMUSCULAR; INTRAVENOUS; SUBCUTANEOUS at 00:20

## 2022-08-26 ASSESSMENT — ACTIVITIES OF DAILY LIVING (ADL)
ADLS_ACUITY_SCORE: 42
ADLS_ACUITY_SCORE: 38
ADLS_ACUITY_SCORE: 42
ADLS_ACUITY_SCORE: 38
ADLS_ACUITY_SCORE: 42
ADLS_ACUITY_SCORE: 38
ADLS_ACUITY_SCORE: 42
ADLS_ACUITY_SCORE: 42

## 2022-08-26 NOTE — PROGRESS NOTES
[facility]  Gastroenterology Progress Note     Hermilo Velasquez MRN# [id number]   YOB: 1932 Age: 89 year old          Assessment and Plan:   Hermilo Olson is a 89 year old male with acute on chronic anemia, admitted 8/23/22 with acute on chronic anemia.     Acute on chronic anemia  Prior GI bleed due to AVMs  Chronically anticoagulated on Eliquis  Hemoglobin baseline is 12.2 and has decrease to 5.8 and now 6.4  He has had no signs of active bleeding.   EGD in 04/2022 revealed erosive gastropathy.   6/2022 Colonoscopy noted angiodysplastic lesions with no bleeding. They were cauterized. Recommended to have an outpatient capsule endoscopy but canceled appt.  EGD in 08/2020 noted non bleeding angioectasias.   Colonoscopy 10/2020 noted six recently bleeding colonic angiodysplastic lesions- treated with APC. One 10 mm polyp removed in sigmoid colon.   Has been anticoagulated on apixiban and currently held- likely source of occult bleeding. Further discussion about need for anticoagulation given risks of recurrent GI bleeds (3rd in 1 year)  Cause of anemia may be multifactorial, vs slow upper or lower GI bleed  I have discussed with patients spouse, Roberta and Deloris, labs findings and recommendations for EGD for anemia. They both agree.     -- canceled EGD and colonoscopy yesterday, EGD planned today  -- NPO  -- would start IV pantoprazole 40 mg BID but allergic per records- continue famotidine  -- Serial hemoglobin and transfuse for hemoglobin of 7 or less  -- Hold apixiban- consider discussion with cardiologist given 3 GI bleeds in last year                Interval History:   Patient became delirious, confused and combative yesterday requiring intervention with restraints. Much more calm and sleeping              Review of Systems:   C: NEGATIVE for fever, chills, change in weight  E/M: NEGATIVE for ear, mouth and throat problems  R: NEGATIVE for significant cough or SOB  CV:  NEGATIVE for chest pain, palpitations or peripheral edema             Medications:   I have reviewed this patient's current medications    alfuzosin ER  10 mg Oral Daily     atorvastatin  10 mg Oral Daily     famotidine  40 mg Oral Q48H     fluticasone-vilanterol  1 puff Inhalation Daily     gabapentin  300 mg Oral At Bedtime     metoprolol succinate ER  12.5 mg Oral BID     QUEtiapine  25 mg Oral At Bedtime     sodium chloride (PF)  3 mL Intracatheter Q8H     umeclidinium  1 puff Inhalation Daily                  Physical Exam:   Vitals were reviewed  Vital Signs with Ranges  Temp:  [98.6  F (37  C)] 98.6  F (37  C)  Pulse:  [85] 85  Resp:  [16] 16  BP: (144-156)/() 156/104  SpO2:  [92 %-94 %] 94 %  I/O last 3 completed shifts:  In: 786 [P.O.:280; I.V.:506]  Out: -   Constitutional: healthy, alert, severe distress and pale, agitated  Cardiovascular: negative, PMI normal. No lifts, heaves, or thrills. RRR. No murmurs, clicks gallops or rub  Respiratory: negative, Percussion normal. Good diaphragmatic excursion. Lungs clear  Abdomen: Abdomen soft, non-tender. BS normal. No masses, organomegaly           Data:   I reviewed the patient's new clinical lab test results.   Recent Labs   Lab Test 08/26/22  0620 08/25/22  2352 08/25/22  1930 08/24/22  0411 08/23/22  1712 08/23/22  1426 07/28/22  1643 05/08/22  2336 05/08/22  1223 10/24/20  0037 10/23/20  1617   WBC 7.4  --   --   --  8.5  --  12.9*   < > 10.7   < > 8.0   HGB 8.0*  8.0* 8.1* 8.2*   < > 5.3*   < > 9.5*   < > 11.2*   < > 6.6*   MCV 97  --   --   --  97  --  89   < > 88   < > 89     --   --   --  225  --  150   < > 244   < > 279   INR  --   --   --   --  1.65*  --   --   --  1.99*  --  1.39*    < > = values in this interval not displayed.     Recent Labs   Lab Test 08/26/22  0620 08/25/22  1100 08/24/22  0411   POTASSIUM 4.0 4.0 4.0   CHLORIDE 118* 119* 114*   CO2 23 20 24   BUN 31* 36* 45*   ANIONGAP 3 7 5     Recent Labs   Lab Test  08/23/22  1712 08/23/22  1322 05/08/22  1501 04/11/22  0744 04/10/22  2236 04/10/22  2034 03/22/20  1950 03/22/20  1914   ALBUMIN 2.6*  --   --  2.2*  --  2.6*   < > 2.6*   BILITOTAL 0.2  --   --  0.3  --  0.5   < > 0.6   ALT 22  --   --  10  --  10   < > 27   AST 24  --   --  17  --  11   < > 101*   PROTEIN  --  Negative 50 *  --  30 *  --    < >  --    LIPASE  --   --   --   --   --   --   --  54*    < > = values in this interval not displayed.       I reviewed the patient's new imaging results.    All laboratory data reviewed  All imaging studies reviewed by me.    Sammie Cox PA-C,  8/25/2022  [facility]  Office : [phone]  Cell: [phone] (Dr. Florentino)  Cell: [phone] (Sammie Cox PA-C)

## 2022-08-26 NOTE — PROGRESS NOTES
Notified by RN that patient was again agitated and combative with staff. Patient had needed 4-point restraints yesterday but had become more cooperative so they were discontinued. RN discussed patient's delirium with patient's wife who endorsed this type of confusion happening at home but always getting significantly worse during hospitalizations.    - 4-point restraint order placed for non-aggressive, non-self-destructive behavior and medical healing  - 5mg IM zyprexa Once    ILYA Tee, CNP  Hospitalist - [facility]  Text me on the [employer] [facility] for a textback  Text page with web based paging for a callback

## 2022-08-26 NOTE — PROGRESS NOTES
Unable to assess patients orientation during shift. Pt has been somnolence since report. Arousal by stimulation, but has not been verbal or awake. Pt completed EGD at 1030 where he received versed and fentanyl, and returned still sleeping. Suctioned mouth due to excess secretions, and provided mouth cares. Pt has 3+ pitting edema bilaterally on his lower extremities pulses are palpable. Sitter remains with patient. 2L via n/c continued. Full Diet reinstated. x1 incontinent black stool smear.

## 2022-08-26 NOTE — PLAN OF CARE
Alert and confused. Disoriented x4. Combative and agitated this morning. Was on 4 points soft restraints. Pt more cooperative and calm this evening. All 4 restraints discontinued. Hgb 8.0. incontinent of black tarry stools x2. Tolerating reg diet. Up Ax1 walker+GB. NPO at midnight for possible EGD tomorrow. GI following.

## 2022-08-26 NOTE — PROGRESS NOTES
[facility]  Hospitalist Progress Note  En Malagon MD  08/26/2022    Assessment & Plan   Hermilo Velasquez is a 89 year old male with hx of known colonic AVMs, chronic a-fib and recurrent PE on chronic AC with apixaban, aortic stenosis s/p TAVR, COPD, and cognitive impairment who was admitted on 8/23/2022 for acute anemia due to suspected GI bleed     Acute blood loss on chronic anemia due to suspected GI bleed  Hx colonic AVMs  * Referred to ED after routine blood work revealed a Hgb 5.8  * No obvious history of GI bleed however patient has history of AVMs in his colon which was treated with APC on 4/12/2022; also on on chronic AC with apixaban  * Transfused 1u pRBCs in the ED  * [facility] consulted on admission  - Received additional 1u pRBCs earlier this morning, 8/24  - Follow Hgb q6h today. Conditional unit ordered for Hgb 7 or less  - EGD and colonoscopy tomorrow, 8/26  - Holding PTA apixaban  - Not on PPI due to allergy  - Continues on PTA famotidine 40 mg BID  - [facility] following,  EGD completed : no endoscopic abnormalities noted, colonoscopy deferred due to agitated delirium and hgb stability      Cognitive impairment  Delirium  * Patient lives with his wife who is his primary caregiver. Also has home care.  - patient had prior delirium episodes when he had surgery  - PT/OT consults requested but too agitated  - sitter + restraints  - increase seroquel to 50 mg at bedtime and 25 mg every 6 hours for agitation  - UA negative     FRED on CKD stage IIIa, Improved  * Creatinine up to 2.35 from 0.87 on 7/30/22, although creatinine has previously fluctuated in 1.6-1.9 range  * Suspected pre-renal state due to blood loss, borderline hypotension (noted on arrival), home torsemide, and recent use of Bactrim for UTI  * Improved with IV fluids and blood transfusion  - Creatinine 2.17 >> 2  - SL IVF, low threshold to d/c for resp distress  - Holding PTA torsemide     Chronic atrial  "fibrillation  Hx of pulmonary embolism  - Continues on PTA metoprolol 12.5 mg BID  - Holding PTA apixaban in setting of suspected GI bleed  - Initiated discussion regarding risks/benefits of anticoagulation with patient's wife. Conversation with patient limited due to apparent cognitive impairment. Patients wife agrees on holding AC for the time being, but would like to resume conversation re: long term AC with patient's cardiologist, who he has an appointment with on Fri.      Hx aortic stenosis s/p TAVR  * TTE (8/22): EF 50-55%, unable to evaluate WMA due to limited study; mean AV gradient 14 mmHg  - Appears compensated without signs of volume overload  - Low threshold to d/c IV fluids for resp distress     Peripheral neuropathy  - PTA gabapentin has been decreased from 600 to 300 mg qhs in setting of FRED     Otherwise chronic and stable medical conditions  Hx of right TKA on 7/25/2022  COPD  Dyslipidemia  Hx of lung cancer s/p wedge resection (2019)  Hx of non-Hodgkin's Lymphoma, in remission  MGUS  BPH     Diet: regular diet    DVT Prophylaxis: Pneumatic Compression Devices  Suazo Catheter: Not present  Code Status: Full Code         Disposition:   -- not safe for discharge until off restraints and sitter     The patient's care was discussed with the his wife Roberta over the telephone 8/26     Interval History   -- more agitation last night  -- noted severe agitation needing 4 point restraints  -- he is sedated    -Data reviewed today: I reviewed all new labs and imaging over the last 24 hours. I personally reviewed no images or EKG's today.    Physical Exam    , Blood pressure 107/49, pulse 91, temperature 98.6  F (37  C), temperature source Oral, resp. rate 19, height 1.727 m (5' 8\"), weight 74.8 kg (165 lb), SpO2 100 %.  Vitals:    08/23/22 1634   Weight: 74.8 kg (165 lb)     Vital Signs with Ranges  Temp:  [98.6  F (37  C)] 98.6  F (37  C)  Pulse:  [62-97] 91  Resp:  [13-25] 19  BP: ()/() " 107/49  SpO2:  [89 %-100 %] 100 %  I/O's Last 24 hours  No intake/output data recorded.    Constitutional: In restraints, sedated  Respiratory: Clear to auscultation bilaterally, no crackles or wheezing  Cardiovascular: Regular rate and rhythm, normal S1 and S2  GI: Normal bowel sounds, soft, non-distended, non-tender  Skin/Integumen: No rashes, no cyanosis, no edema  Other:       Medications   All medications were reviewed.      alfuzosin ER  10 mg Oral Daily     atorvastatin  10 mg Oral Daily     famotidine  40 mg Oral Q48H     fluticasone-vilanterol  1 puff Inhalation Daily     gabapentin  300 mg Oral At Bedtime     metoprolol succinate ER  12.5 mg Oral BID     QUEtiapine  50 mg Oral At Bedtime     sodium chloride (PF)  3 mL Intracatheter Q8H     umeclidinium  1 puff Inhalation Daily        Data   Recent Labs   Lab 08/26/22  0620 08/25/22  2352 08/25/22  1930 08/25/22  1124 08/25/22  1100 08/24/22  1817 08/24/22  0411 08/23/22  1712   WBC 7.4  --   --   --   --   --   --  8.5   HGB 8.0*  8.0* 8.1* 8.2*   < > 7.8*   < > 6.4* 5.3*   MCV 97  --   --   --   --   --   --  97     --   --   --   --   --   --  225   INR  --   --   --   --   --   --   --  1.65*     --   --   --  146*  --  143 142   POTASSIUM 4.0  --   --   --  4.0  --  4.0 3.9   CHLORIDE 118*  --   --   --  119*  --  114* 112*   CO2 23  --   --   --  20  --  24 24   BUN 31*  --   --   --  36*  --  45* 50*   CR 1.91*  --   --   --  1.99*  --  2.17* 2.35*   ANIONGAP 3  --   --   --  7  --  5 6   AMRITA 8.6  --   --   --  8.4*  --  8.4* 8.5   GLC 94  --   --   --  85  --  96 95   ALBUMIN  --   --   --   --   --   --   --  2.6*   PROTTOTAL  --   --   --   --   --   --   --  6.1*   BILITOTAL  --   --   --   --   --   --   --  0.2   ALKPHOS  --   --   --   --   --   --   --  112   ALT  --   --   --   --   --   --   --  22   AST  --   --   --   --   --   --   --  24    < > = values in this interval not displayed.       No results found for this or any  previous visit (from the past 24 hour(s)).    En Malagon MD  Text Page  (7am to 6pm)

## 2022-08-26 NOTE — PLAN OF CARE
[id number]    Alert, disoriented x 4.  VSS on 1L NC.  NPO at midnight for possible EGD.  Patient switched to seroquel for behavior concerns.  Seroquel PO given x2.  Soft Restraints applied at 2338.  Provider ordered Zyprexa IM x1 as well.  Hgb q6h 8.1.  Restraints discontinued at 0640.  Possible EGD today. Discharge pending.

## 2022-08-27 ENCOUNTER — APPOINTMENT (OUTPATIENT)
Dept: ULTRASOUND IMAGING | Facility: CLINIC | Age: 87
DRG: 377 | End: 2022-08-27
Attending: HOSPITALIST
Payer: COMMERCIAL

## 2022-08-27 ENCOUNTER — APPOINTMENT (OUTPATIENT)
Dept: CT IMAGING | Facility: CLINIC | Age: 87
DRG: 377 | End: 2022-08-27
Attending: HOSPITALIST
Payer: COMMERCIAL

## 2022-08-27 ENCOUNTER — APPOINTMENT (OUTPATIENT)
Dept: GENERAL RADIOLOGY | Facility: CLINIC | Age: 87
DRG: 377 | End: 2022-08-27
Attending: HOSPITALIST
Payer: COMMERCIAL

## 2022-08-27 LAB
ALBUMIN SERPL-MCNC: 2.3 G/DL (ref 3.4–5)
ALP SERPL-CCNC: 97 U/L (ref 40–150)
ALT SERPL W P-5'-P-CCNC: 12 U/L (ref 0–70)
ANION GAP SERPL CALCULATED.3IONS-SCNC: 7 MMOL/L (ref 3–14)
ANION GAP SERPL CALCULATED.3IONS-SCNC: 8 MMOL/L (ref 3–14)
AST SERPL W P-5'-P-CCNC: 15 U/L (ref 0–45)
BASE EXCESS BLDA CALC-SCNC: -3.5 MMOL/L (ref -9–1.8)
BILIRUB SERPL-MCNC: 0.6 MG/DL (ref 0.2–1.3)
BUN SERPL-MCNC: 33 MG/DL (ref 7–30)
BUN SERPL-MCNC: 33 MG/DL (ref 7–30)
CALCIUM SERPL-MCNC: 8.4 MG/DL (ref 8.5–10.1)
CALCIUM SERPL-MCNC: 8.6 MG/DL (ref 8.5–10.1)
CHLORIDE BLD-SCNC: 118 MMOL/L (ref 94–109)
CHLORIDE BLD-SCNC: 119 MMOL/L (ref 94–109)
CO2 SERPL-SCNC: 20 MMOL/L (ref 20–32)
CO2 SERPL-SCNC: 23 MMOL/L (ref 20–32)
CREAT SERPL-MCNC: 1.6 MG/DL (ref 0.66–1.25)
CREAT SERPL-MCNC: 1.79 MG/DL (ref 0.66–1.25)
ERYTHROCYTE [DISTWIDTH] IN BLOOD BY AUTOMATED COUNT: 17.8 % (ref 10–15)
GFR SERPL CREATININE-BSD FRML MDRD: 36 ML/MIN/1.73M2
GFR SERPL CREATININE-BSD FRML MDRD: 41 ML/MIN/1.73M2
GLUCOSE BLD-MCNC: 132 MG/DL (ref 70–99)
GLUCOSE BLD-MCNC: 73 MG/DL (ref 70–99)
GLUCOSE BLDC GLUCOMTR-MCNC: 71 MG/DL (ref 70–99)
HCO3 BLD-SCNC: 23 MMOL/L (ref 21–28)
HCT VFR BLD AUTO: 27.9 % (ref 40–53)
HGB BLD-MCNC: 8 G/DL (ref 13.3–17.7)
HGB BLD-MCNC: 8.1 G/DL (ref 13.3–17.7)
HGB BLD-MCNC: 8.3 G/DL (ref 13.3–17.7)
HGB BLD-MCNC: 8.6 G/DL (ref 13.3–17.7)
LACTATE SERPL-SCNC: 0.5 MMOL/L (ref 0.7–2)
MCH RBC QN AUTO: 29.6 PG (ref 26.5–33)
MCHC RBC AUTO-ENTMCNC: 30.8 G/DL (ref 31.5–36.5)
MCV RBC AUTO: 96 FL (ref 78–100)
MRSA DNA SPEC QL NAA+PROBE: NEGATIVE
O2/TOTAL GAS SETTING VFR VENT: 2 %
PCO2 BLD: 49 MM HG (ref 35–45)
PH BLD: 7.28 [PH] (ref 7.35–7.45)
PLATELET # BLD AUTO: 188 10E3/UL (ref 150–450)
PO2 BLD: 94 MM HG (ref 80–105)
POTASSIUM BLD-SCNC: 4.2 MMOL/L (ref 3.4–5.3)
POTASSIUM BLD-SCNC: 4.5 MMOL/L (ref 3.4–5.3)
PROT SERPL-MCNC: 5.6 G/DL (ref 6.8–8.8)
RBC # BLD AUTO: 2.91 10E6/UL (ref 4.4–5.9)
SA TARGET DNA: NEGATIVE
SODIUM SERPL-SCNC: 147 MMOL/L (ref 133–144)
SODIUM SERPL-SCNC: 148 MMOL/L (ref 133–144)
WBC # BLD AUTO: 7.4 10E3/UL (ref 4–11)

## 2022-08-27 PROCEDURE — 36415 COLL VENOUS BLD VENIPUNCTURE: CPT | Performed by: INTERNAL MEDICINE

## 2022-08-27 PROCEDURE — 99233 SBSQ HOSP IP/OBS HIGH 50: CPT | Performed by: HOSPITALIST

## 2022-08-27 PROCEDURE — 71045 X-RAY EXAM CHEST 1 VIEW: CPT

## 2022-08-27 PROCEDURE — 36415 COLL VENOUS BLD VENIPUNCTURE: CPT | Performed by: HOSPITALIST

## 2022-08-27 PROCEDURE — 120N000001 HC R&B MED SURG/OB

## 2022-08-27 PROCEDURE — 87899 AGENT NOS ASSAY W/OPTIC: CPT | Performed by: HOSPITALIST

## 2022-08-27 PROCEDURE — 258N000001 HC RX 258: Performed by: HOSPITALIST

## 2022-08-27 PROCEDURE — 82310 ASSAY OF CALCIUM: CPT | Performed by: INTERNAL MEDICINE

## 2022-08-27 PROCEDURE — 250N000011 HC RX IP 250 OP 636: Performed by: HOSPITALIST

## 2022-08-27 PROCEDURE — 36600 WITHDRAWAL OF ARTERIAL BLOOD: CPT

## 2022-08-27 PROCEDURE — 250N000013 HC RX MED GY IP 250 OP 250 PS 637: Performed by: INTERNAL MEDICINE

## 2022-08-27 PROCEDURE — 82803 BLOOD GASES ANY COMBINATION: CPT | Performed by: HOSPITALIST

## 2022-08-27 PROCEDURE — 83605 ASSAY OF LACTIC ACID: CPT | Performed by: HOSPITALIST

## 2022-08-27 PROCEDURE — 93971 EXTREMITY STUDY: CPT | Mod: RT

## 2022-08-27 PROCEDURE — 87641 MR-STAPH DNA AMP PROBE: CPT | Performed by: HOSPITALIST

## 2022-08-27 PROCEDURE — 70450 CT HEAD/BRAIN W/O DYE: CPT

## 2022-08-27 PROCEDURE — 85014 HEMATOCRIT: CPT | Performed by: INTERNAL MEDICINE

## 2022-08-27 PROCEDURE — 85018 HEMOGLOBIN: CPT | Performed by: INTERNAL MEDICINE

## 2022-08-27 RX ORDER — CEFTRIAXONE 2 G/1
2 INJECTION, POWDER, FOR SOLUTION INTRAMUSCULAR; INTRAVENOUS EVERY 24 HOURS
Status: DISCONTINUED | OUTPATIENT
Start: 2022-08-27 | End: 2022-08-27

## 2022-08-27 RX ORDER — AZITHROMYCIN 500 MG/1
500 INJECTION, POWDER, LYOPHILIZED, FOR SOLUTION INTRAVENOUS EVERY 24 HOURS
Status: DISCONTINUED | OUTPATIENT
Start: 2022-08-27 | End: 2022-08-27

## 2022-08-27 RX ORDER — DOXYCYCLINE 100 MG/10ML
100 INJECTION, POWDER, LYOPHILIZED, FOR SOLUTION INTRAVENOUS EVERY 12 HOURS
Status: DISCONTINUED | OUTPATIENT
Start: 2022-08-27 | End: 2022-09-01

## 2022-08-27 RX ADMIN — Medication 12.5 MG: at 08:33

## 2022-08-27 RX ADMIN — ATORVASTATIN CALCIUM 10 MG: 10 TABLET, FILM COATED ORAL at 08:34

## 2022-08-27 RX ADMIN — DOXYCYCLINE 100 MG: 100 INJECTION, POWDER, LYOPHILIZED, FOR SOLUTION INTRAVENOUS at 18:53

## 2022-08-27 RX ADMIN — DEXTROSE AND SODIUM CHLORIDE: 5; 450 INJECTION, SOLUTION INTRAVENOUS at 14:19

## 2022-08-27 RX ADMIN — CEFEPIME HYDROCHLORIDE 2 G: 2 INJECTION, POWDER, FOR SOLUTION INTRAVENOUS at 17:48

## 2022-08-27 RX ADMIN — ALFUZOSIN HYDROCHLORIDE 10 MG: 10 TABLET, EXTENDED RELEASE ORAL at 08:34

## 2022-08-27 ASSESSMENT — ACTIVITIES OF DAILY LIVING (ADL)
ADLS_ACUITY_SCORE: 38
ADLS_ACUITY_SCORE: 42

## 2022-08-27 NOTE — PLAN OF CARE
[id number]. Patient slept majority of shift, arouses to voice and occassionally opens eyes. No complaint of pain. Patient took one oral medication during whole shift. Oral suction with yanker with some thick secretions. Incontinent urine. T/R Q2hr. Continue to monitor.

## 2022-08-27 NOTE — PLAN OF CARE
Goal Outcome Evaluation: [id number]    Sleeping for most of shift, Arouse to voice, but wont open his eyes. Some productive cough, but unable to expel it. Thus oral suction provided which yield some thick mucus secretion. Incontinence of urine. Turn and reposition with A2. Continue to monitor.

## 2022-08-27 NOTE — PROGRESS NOTES
[facility]    Hospitalist Progress Note    Interval History   Patient is sleepy and kept his eyes closed for the most part.  Part of it I think is from his underlying delirium.  He is arousable but again closes his eyes right away.  Review of systems could not be obtained.  Does not really follow commands and dresses any kind of examination.    -Data reviewed today: I reviewed all new labs and imaging results over the last 24 hours. I personally reviewed no images or EKG's today.    Physical Exam   Temp: 99.7  F (37.6  C) Temp src: Axillary BP: 113/45 Pulse: 79   Resp: 20 SpO2: 95 % O2 Device: Nasal cannula Oxygen Delivery: 2 LPM  Vitals:    08/23/22 1634 08/27/22 0600   Weight: 74.8 kg (165 lb) 72 kg (158 lb 11.7 oz)     Vital Signs with Ranges  Temp:  [98.5  F (36.9  C)-99.7  F (37.6  C)] 99.7  F (37.6  C)  Pulse:  [79-96] 79  Resp:  [20] 20  BP: (113-159)/(45-97) 113/45  SpO2:  [88 %-97 %] 95 %  No intake/output data recorded.    Physical Exam  Constitutional:       Appearance: He is ill-appearing.      Comments: Borderline febrile and keeps his eyes closed mostly.  Continues to have delirium and confusion.  Review of systems could not be obtained.   HENT:      Head: Normocephalic.   Eyes:      Pupils: Pupils are equal, round, and reactive to light.   Cardiovascular:      Rate and Rhythm: Normal rate and regular rhythm.      Pulses: Normal pulses.      Heart sounds: Normal heart sounds.   Pulmonary:      Effort: Pulmonary effort is normal. No respiratory distress.      Breath sounds: Normal breath sounds.   Abdominal:      General: Abdomen is flat. Bowel sounds are normal. There is no distension.      Tenderness: There is no abdominal tenderness. There is no guarding.   Musculoskeletal:      Comments: Right knee swollen from recent surgery with right lower extremity edema.  Surgical site appears stable.   Skin:     General: Skin is warm and dry.   Psychiatric:         Mood and Affect:  Mood normal.           Medications     dextrose 5% and 0.45% NaCl         alfuzosin ER  10 mg Oral Daily     atorvastatin  10 mg Oral Daily     famotidine  40 mg Oral Q48H     fluticasone-vilanterol  1 puff Inhalation Daily     gabapentin  300 mg Oral At Bedtime     metoprolol succinate ER  12.5 mg Oral BID     QUEtiapine  50 mg Oral At Bedtime     sodium chloride (PF)  3 mL Intracatheter Q8H     umeclidinium  1 puff Inhalation Daily       Data   Recent Labs   Lab 08/27/22  1139 08/27/22  0829 08/27/22  0613 08/26/22  2355 08/26/22  1557 08/26/22  0620 08/25/22  1124 08/25/22  1100 08/24/22  0411 08/23/22  1712   WBC  --   --  7.4  --   --  7.4  --   --   --  8.5   HGB 8.3*  --  8.6*  8.6* 8.6*   < > 8.0*  8.0*   < > 7.8*   < > 5.3*   MCV  --   --  96  --   --  97  --   --   --  97   PLT  --   --  188  --   --  186  --   --   --  225   INR  --   --   --   --   --   --   --   --   --  1.65*   NA  --   --  147*  --   --  144  --  146*   < > 142   POTASSIUM  --   --  4.5  --   --  4.0  --  4.0   < > 3.9   CHLORIDE  --   --  119*  --   --  118*  --  119*   < > 112*   CO2  --   --  20  --   --  23  --  20   < > 24   BUN  --   --  33*  --   --  31*  --  36*   < > 50*   CR  --   --  1.79*  --   --  1.91*  --  1.99*   < > 2.35*   ANIONGAP  --   --  8  --   --  3  --  7   < > 6   AMRITA  --   --  8.6  --   --  8.6  --  8.4*   < > 8.5   GLC  --  71 73  --   --  94  --  85   < > 95   ALBUMIN  --   --   --   --   --   --   --   --   --  2.6*   PROTTOTAL  --   --   --   --   --   --   --   --   --  6.1*   BILITOTAL  --   --   --   --   --   --   --   --   --  0.2   ALKPHOS  --   --   --   --   --   --   --   --   --  112   ALT  --   --   --   --   --   --   --   --   --  22   AST  --   --   --   --   --   --   --   --   --  24    < > = values in this interval not displayed.       No results found for this or any previous visit (from the past 24 hour(s)).      Assessment & Plan   Hermilo Velasquez is a 89 year old male with hx of  known colonic AVMs, chronic a-fib and recurrent PE on chronic AC with apixaban, aortic stenosis s/p TAVR, COPD, and cognitive impairment who was admitted on 8/23/2022 for acute anemia due to suspected GI bleed     Acute blood loss on chronic anemia due to suspected GI bleed  Hx colonic AVMs  * Referred to ED after routine blood work revealed a Hgb 5.8  * No obvious history of GI bleed however patient has history of AVMs in his colon which was treated with APC on 4/12/2022; also on on chronic AC with apixaban  * Transfused 1u pRBCs in the ED  * [facility] consulted on admission  - Received additional 1u pRBCs earlier this morning, 8/24  - Holding PTA apixaban  - Not on PPI due to allergy  - Continues on PTA famotidine 40 mg BID  - [facility] following,  EGD completed : no endoscopic abnormalities noted, colonoscopy deferred due to agitated delirium and hgb stability      Cognitive impairment  Delirium  * Patient lives with his wife who is his primary caregiver. Also has home care.  - patient had prior delirium episodes when he had surgery  - PT/OT consults requested but too agitated  - sitter + restraints  - increase seroquel to 50 mg at bedtime and 25 mg every 6 hours for agitation.  Patient has been quite sleepy since yesterday.  - UA negative  -Patient continues to have significant delirium today with increased sleepiness.  Will check ABG.  We will also check right lower extremity DVT ultrasound since its swollen.  Surgical wound from his previous knee surgery appears stable but if he starts spiking fever will consider imaging it to evaluate for any underlying infection.  Currently no leukocytosis.     FRED on CKD stage IIIa, Improved  * Creatinine up to 2.35 from 0.87 on 7/30/22, although creatinine has previously fluctuated in 1.6-1.9 range  * Suspected pre-renal state due to blood loss, borderline hypotension (noted on arrival), home torsemide, and recent use of Bactrim for UTI  * Improved with IV fluids and blood  transfusion  - Creatinine at 1.79 today but he is getting hypernatremic likely secondary to reduced oral intake.  Will start on D5 half NS at 75 cc today and recheck CMP this evening.  - SL IVF, low threshold to d/c for resp distress  - Holding PTA torsemide  -Chest x-ray ordered as well.     Chronic atrial fibrillation  Hx of pulmonary embolism  - Continues on PTA metoprolol 12.5 mg BID  - Holding PTA apixaban in setting of suspected GI bleed  - Initiated discussion regarding risks/benefits of anticoagulation with patient's wife. Conversation with patient limited due to apparent cognitive impairment. Patients wife agrees on holding AC for the time being, but would like to resume conversation re: long term AC with patient's cardiologist, who he has an appointment with on Fri.      Hx aortic stenosis s/p TAVR  * TTE (8/22): EF 50-55%, unable to evaluate WMA due to limited study; mean AV gradient 14 mmHg  - Appears compensated without signs of volume overload  - Low threshold to d/c IV fluids for resp distress     Peripheral neuropathy  - PTA gabapentin has been decreased from 600 to 300 mg qhs in setting of FRED     Otherwise chronic and stable medical conditions  Hx of right TKA on 7/25/2022  COPD  Dyslipidemia  Hx of lung cancer s/p wedge resection (2019)  Hx of non-Hodgkin's Lymphoma, in remission  MGUS  BPH     Diet: regular diet    DVT Prophylaxis: Pneumatic Compression Devices  Suazo Catheter: Not present  Code Status: Full Code         Disposition:   -- not safe for discharge until off restraints and sitter     The patient's care was discussed with the his wife Roberta over the telephone 8/26           DVT Prophylaxis: Pneumatic Compression Devices  Code Status: Full Code  Disposition: Expected discharge when medically stable      Loni Yuen MD, MD  [phone](p)  [phone]( c)

## 2022-08-27 NOTE — PLAN OF CARE
Goal Outcome Evaluation:    Pt confused, was somewhat awake in the morning, open eyes spontaneous upon calling name at times, but remains very lethargic/somnolent. ABG completed, CT w/o contrast of the head, Chest-xray completed. Able to eat some food this morning, other than that not awake enough to eat food. Does required a feeder. Incontinence of urine, external catheter used to obtain urine sample to send to lab. Turn and repositioning frequently. Not coughing as much as yesterday. VSS except, had a low grade temp (99.7). Improved with out intervention. Continue to monitor.

## 2022-08-28 ENCOUNTER — APPOINTMENT (OUTPATIENT)
Dept: SPEECH THERAPY | Facility: CLINIC | Age: 87
DRG: 377 | End: 2022-08-28
Attending: HOSPITALIST
Payer: COMMERCIAL

## 2022-08-28 LAB
ANION GAP SERPL CALCULATED.3IONS-SCNC: 5 MMOL/L (ref 3–14)
BASE EXCESS BLDV CALC-SCNC: -1 MMOL/L (ref -7.7–1.9)
BUN SERPL-MCNC: 28 MG/DL (ref 7–30)
CALCIUM SERPL-MCNC: 8 MG/DL (ref 8.5–10.1)
CHLORIDE BLD-SCNC: 119 MMOL/L (ref 94–109)
CO2 SERPL-SCNC: 24 MMOL/L (ref 20–32)
CREAT SERPL-MCNC: 1.43 MG/DL (ref 0.66–1.25)
GFR SERPL CREATININE-BSD FRML MDRD: 47 ML/MIN/1.73M2
GLUCOSE BLD-MCNC: 124 MG/DL (ref 70–99)
HCO3 BLDV-SCNC: 26 MMOL/L (ref 21–28)
HGB BLD-MCNC: 7.7 G/DL (ref 13.3–17.7)
HGB BLD-MCNC: 7.8 G/DL (ref 13.3–17.7)
HGB BLD-MCNC: 8.3 G/DL (ref 13.3–17.7)
L PNEUMO1 AG UR QL IA: NEGATIVE
O2/TOTAL GAS SETTING VFR VENT: 3 %
OXYHGB MFR BLDV: 65 % (ref 70–75)
PCO2 BLDV: 52 MM HG (ref 40–50)
PH BLDV: 7.3 [PH] (ref 7.32–7.43)
PO2 BLDV: 37 MM HG (ref 25–47)
POTASSIUM BLD-SCNC: 3.8 MMOL/L (ref 3.4–5.3)
S PNEUM AG SPEC QL: NEGATIVE
SODIUM SERPL-SCNC: 148 MMOL/L (ref 133–144)

## 2022-08-28 PROCEDURE — 120N000001 HC R&B MED SURG/OB

## 2022-08-28 PROCEDURE — 92610 EVALUATE SWALLOWING FUNCTION: CPT | Mod: GN | Performed by: SPEECH-LANGUAGE PATHOLOGIST

## 2022-08-28 PROCEDURE — 250N000013 HC RX MED GY IP 250 OP 250 PS 637: Performed by: INTERNAL MEDICINE

## 2022-08-28 PROCEDURE — 258N000001 HC RX 258: Performed by: HOSPITALIST

## 2022-08-28 PROCEDURE — 36415 COLL VENOUS BLD VENIPUNCTURE: CPT | Performed by: INTERNAL MEDICINE

## 2022-08-28 PROCEDURE — 92526 ORAL FUNCTION THERAPY: CPT | Mod: GN | Performed by: SPEECH-LANGUAGE PATHOLOGIST

## 2022-08-28 PROCEDURE — 85018 HEMOGLOBIN: CPT | Performed by: INTERNAL MEDICINE

## 2022-08-28 PROCEDURE — 80048 BASIC METABOLIC PNL TOTAL CA: CPT | Performed by: INTERNAL MEDICINE

## 2022-08-28 PROCEDURE — 85018 HEMOGLOBIN: CPT | Performed by: HOSPITALIST

## 2022-08-28 PROCEDURE — 36415 COLL VENOUS BLD VENIPUNCTURE: CPT | Performed by: HOSPITALIST

## 2022-08-28 PROCEDURE — 250N000011 HC RX IP 250 OP 636: Performed by: HOSPITALIST

## 2022-08-28 PROCEDURE — 250N000013 HC RX MED GY IP 250 OP 250 PS 637: Performed by: HOSPITALIST

## 2022-08-28 PROCEDURE — 82805 BLOOD GASES W/O2 SATURATION: CPT | Performed by: HOSPITALIST

## 2022-08-28 PROCEDURE — 99233 SBSQ HOSP IP/OBS HIGH 50: CPT | Performed by: HOSPITALIST

## 2022-08-28 RX ORDER — POLYETHYLENE GLYCOL 3350 17 G/17G
17 POWDER, FOR SOLUTION ORAL DAILY
Status: DISCONTINUED | OUTPATIENT
Start: 2022-08-28 | End: 2022-09-08 | Stop reason: HOSPADM

## 2022-08-28 RX ADMIN — FAMOTIDINE 40 MG: 20 TABLET ORAL at 12:45

## 2022-08-28 RX ADMIN — DOXYCYCLINE 100 MG: 100 INJECTION, POWDER, LYOPHILIZED, FOR SOLUTION INTRAVENOUS at 16:48

## 2022-08-28 RX ADMIN — DEXTROSE AND SODIUM CHLORIDE: 5; 450 INJECTION, SOLUTION INTRAVENOUS at 17:03

## 2022-08-28 RX ADMIN — POLYETHYLENE GLYCOL 3350 17 G: 17 POWDER, FOR SOLUTION ORAL at 17:38

## 2022-08-28 RX ADMIN — CEFEPIME HYDROCHLORIDE 2 G: 2 INJECTION, POWDER, FOR SOLUTION INTRAVENOUS at 16:09

## 2022-08-28 RX ADMIN — ALFUZOSIN HYDROCHLORIDE 10 MG: 10 TABLET, EXTENDED RELEASE ORAL at 09:32

## 2022-08-28 RX ADMIN — DEXTROSE AND SODIUM CHLORIDE: 5; 450 INJECTION, SOLUTION INTRAVENOUS at 01:28

## 2022-08-28 RX ADMIN — DOXYCYCLINE 100 MG: 100 INJECTION, POWDER, LYOPHILIZED, FOR SOLUTION INTRAVENOUS at 04:41

## 2022-08-28 RX ADMIN — ATORVASTATIN CALCIUM 10 MG: 10 TABLET, FILM COATED ORAL at 09:32

## 2022-08-28 RX ADMIN — Medication 12.5 MG: at 21:18

## 2022-08-28 ASSESSMENT — ACTIVITIES OF DAILY LIVING (ADL)
ADLS_ACUITY_SCORE: 42

## 2022-08-28 NOTE — PROGRESS NOTES
[facility]    Hospitalist Progress Note    Interval History   Patient is more awake and alert this morning.  Continues to have some baseline confusion.  He is able to have some breakfast with help.  Keeps his eyes closed most of the time during examination but intermittently answers questions and follows commands.    -Data reviewed today: I reviewed all new labs and imaging results over the last 24 hours. I personally reviewed no images or EKG's today.    Physical Exam   Temp: 98.4  F (36.9  C) Temp src: Axillary BP: 98/50 Pulse: 87   Resp: 18 SpO2: 99 % O2 Device: Nasal cannula Oxygen Delivery: 2 LPM  Vitals:    08/23/22 1634 08/27/22 0600 08/28/22 0659   Weight: 74.8 kg (165 lb) 72 kg (158 lb 11.7 oz) 71 kg (156 lb 8.4 oz)     Vital Signs with Ranges  Temp:  [98.1  F (36.7  C)-98.4  F (36.9  C)] 98.4  F (36.9  C)  Pulse:  [85-90] 87  Resp:  [16-18] 18  BP: ()/(49-51) 98/50  SpO2:  [95 %-99 %] 99 %  I/O last 3 completed shifts:  In: 660 [P.O.:660]  Out: 500 [Urine:500]    Physical Exam  Constitutional:       Appearance: He is ill-appearing.      Comments: More awake and alert today.  Afebrile.  Following commands.   HENT:      Head: Normocephalic.   Eyes:      Pupils: Pupils are equal, round, and reactive to light.   Cardiovascular:      Rate and Rhythm: Normal rate and regular rhythm.      Pulses: Normal pulses.      Heart sounds: Normal heart sounds.   Pulmonary:      Effort: Pulmonary effort is normal. No respiratory distress.      Breath sounds: Normal breath sounds.   Abdominal:      General: Abdomen is flat. Bowel sounds are normal. There is no distension.      Tenderness: There is no abdominal tenderness. There is no guarding.   Musculoskeletal:      Comments: Right knee swollen from recent surgery with right lower extremity edema.  Surgical site appears stable.   Skin:     General: Skin is warm and dry.   Psychiatric:         Mood and Affect: Mood normal.            Medications     dextrose 5% and 0.45% NaCl 75 mL/hr at 08/28/22 0128       alfuzosin ER  10 mg Oral Daily     atorvastatin  10 mg Oral Daily     ceFEPIme (MAXIPIME) IV  2 g Intravenous Q24H     doxycycline (VIBRAMYCIN) IV  100 mg Intravenous Q12H     famotidine  40 mg Oral Q48H     fluticasone-vilanterol  1 puff Inhalation Daily     gabapentin  300 mg Oral At Bedtime     metoprolol succinate ER  12.5 mg Oral BID     QUEtiapine  50 mg Oral At Bedtime     sodium chloride (PF)  3 mL Intracatheter Q8H     umeclidinium  1 puff Inhalation Daily       Data   Recent Labs   Lab 08/28/22  0620 08/27/22  2245 08/27/22  1646 08/27/22  1139 08/27/22  0829 08/27/22  0613 08/26/22  1557 08/26/22  0620 08/24/22  0411 08/23/22  1712   WBC  --   --   --   --   --  7.4  --  7.4  --  8.5   HGB 7.8* 8.1* 8.0*   < >  --  8.6*  8.6*   < > 8.0*  8.0*   < > 5.3*   MCV  --   --   --   --   --  96  --  97  --  97   PLT  --   --   --   --   --  188  --  186  --  225   INR  --   --   --   --   --   --   --   --   --  1.65*   * 148*  --   --   --  147*  --  144   < > 142   POTASSIUM 3.8 4.2  --   --   --  4.5  --  4.0   < > 3.9   CHLORIDE 119* 118*  --   --   --  119*  --  118*   < > 112*   CO2 24 23  --   --   --  20  --  23   < > 24   BUN 28 33*  --   --   --  33*  --  31*   < > 50*   CR 1.43* 1.60*  --   --   --  1.79*  --  1.91*   < > 2.35*   ANIONGAP 5 7  --   --   --  8  --  3   < > 6   AMRITA 8.0* 8.4*  --   --   --  8.6  --  8.6   < > 8.5   * 132*  --   --  71 73  --  94   < > 95   ALBUMIN  --  2.3*  --   --   --   --   --   --   --  2.6*   PROTTOTAL  --  5.6*  --   --   --   --   --   --   --  6.1*   BILITOTAL  --  0.6  --   --   --   --   --   --   --  0.2   ALKPHOS  --  97  --   --   --   --   --   --   --  112   ALT  --  12  --   --   --   --   --   --   --  22   AST  --  15  --   --   --   --   --   --   --  24    < > = values in this interval not displayed.       Recent Results (from the past 24 hour(s))   XR  Chest 1 View    Narrative    EXAM: XR CHEST 1 VIEW  LOCATION: [facility]  DATE/TIME: 8/27/2022 1:35 PM    INDICATION: ?pneumonia  COMPARISON: 07/27/2022      Impression    IMPRESSION: Heart size is normal. Patchy opacities noted in both lungs, right more so than left, and new/progressed since previous exam. Findings concerning for pneumonia, though asymmetric pulmonary edema or inflammatory process possible. No definite   pleural effusion or pneumothorax. Median sternotomy wires unchanged.   CT Head w/o Contrast    Narrative    EXAM: CT HEAD W/O CONTRAST  LOCATION: [facility]  DATE/TIME: 8/27/2022 1:43 PM    INDICATION: Altered mental status. Confusion.  COMPARISON: 06/01/2022.  TECHNIQUE: Routine CT Head without IV contrast. Multiplanar reformats. Dose reduction techniques were used.    FINDINGS: Patient motion degrades evaluation.    INTRACRANIAL CONTENTS: No definite intracranial hemorrhage, extraaxial collection, or mass effect.  No definite CT evidence of acute infarct. Mild presumed chronic small vessel ischemic changes. Mild to moderate generalized volume loss. No hydrocephalus.     Note is made of coarse atherosclerotic calcifications of the intracranial vasculature.     VISUALIZED ORBITS/SINUSES/MASTOIDS: Prior bilateral cataract surgery. Visualized portions of the orbits are otherwise unremarkable. Bilateral maxillary sinus mucosal thickening. No middle ear or mastoid effusion.    BONES/SOFT TISSUES: No definite acute abnormality.      Impression    IMPRESSION:    Motion degraded examination.    1.  No definite CT evidence for acute intracranial process.  2.  Brain atrophy and presumed chronic microvascular ischemic changes as above.   US Lower Extremity Venous Duplex Right    Narrative    EXAM: US LOWER EXTREMITY VENOUS DUPLEX RIGHT  LOCATION: [facility]  DATE/TIME: 8/27/2022 2:05 PM    INDICATION: 89-year-old patient with right  knee surgery and pain, request made for evaluation of DVT  COMPARISON: None.  TECHNIQUE: Venous Duplex ultrasound of the right lower extremity with and without compression, augmentation and duplex. Color flow and spectral Doppler with waveform analysis performed.    FINDINGS: Exam includes the common femoral, femoral, popliteal, and contralateral common femoral veins as well as segmentally visualized deep calf veins and greater saphenous vein.     RIGHT: No deep vein thrombosis. No superficial thrombophlebitis. No popliteal cyst.      Impression    IMPRESSION:  1.  No deep venous thrombosis in the right lower extremity.         Assessment & Plan   Hermilo Velasquez is a 89 year old male with hx of known colonic AVMs, chronic a-fib and recurrent PE on chronic AC with apixaban, aortic stenosis s/p TAVR, COPD, and cognitive impairment who was admitted on 8/23/2022 for acute anemia due to suspected GI bleed     Acute blood loss on chronic anemia due to suspected GI bleed  Hx colonic AVMs  * Referred to ED after routine blood work revealed a Hgb 5.8  * No obvious history of GI bleed however patient has history of AVMs in his colon which was treated with APC on 4/12/2022; also on on chronic AC with apixaban  * Transfused 1u pRBCs in the ED  * [facility] consulted on admission  - Received additional 1u pRBCs earlier this morning, 8/24  - Holding PTA apixaban  - Not on PPI due to allergy  - Continues on PTA famotidine 40 mg BID  - [facility] following,  EGD completed : no endoscopic abnormalities noted, colonoscopy deferred due to agitated delirium and hgb stability    Hospital-acquired pneumonia  -Patient had a worsening mental status yesterday.  ABG did show some acidosis.  Chest x-ray showed bilateral new infiltrates concerning for pneumonia with right greater than left.  Could have a component of aspiration.  Was started on IV cefepime and azithromycin.  Improved today.  Speech eval showed esophageal dysphagia.   Recommended one-to-one supervision with minced and moist food with mildly thick liquids.    Cognitive impairment  Delirium  * Patient lives with his wife who is his primary caregiver. Also has home care.  - patient had prior delirium episodes when he had surgery  - PT/OT consults requested but too agitated  - sitter + restraints  - increase seroquel to 50 mg at bedtime and 25 mg every 6 hours for agitation.  Patient has been quite sleepy since yesterday.  - UA negative  -Patient continues to have significant delirium today with increased sleepiness.  Will check ABG.  We will also check right lower extremity DVT ultrasound since its swollen.  Surgical wound from his previous knee surgery appears stable but if he starts spiking fever will consider imaging it to evaluate for any underlying infection.  Currently no leukocytosis.     FRED on CKD stage IIIa, Improved  * Creatinine up to 2.35 from 0.87 on 7/30/22, although creatinine has previously fluctuated in 1.6-1.9 range  * Suspected pre-renal state due to blood loss, borderline hypotension (noted on arrival), home torsemide, and recent use of Bactrim for UTI  * Improved with IV fluids and blood transfusion  - Creatinine at 1.79 today but he is getting hypernatremic likely secondary to reduced oral intake.  Will start on D5 half NS at 75 cc today   - SL IVF, low threshold to d/c for resp distress  - Holding PTA torsemide    Hypernatremia-likely secondary to reduced oral intake.  Currently on D5 half NS.  Sodium staying stable at 148.  Started having improved oral intake today.  Will monitor fluid status closely.     Chronic atrial fibrillation  Hx of pulmonary embolism  - Continues on PTA metoprolol 12.5 mg BID  - Holding PTA apixaban in setting of suspected GI bleed  - Initiated discussion regarding risks/benefits of anticoagulation with patient's wife. Conversation with patient limited due to apparent cognitive impairment. Patients wife agrees on holding AC for the  time being, but would like to resume conversation re: long term AC with patient's cardiologist, who he has an appointment with on Fri.      Hx aortic stenosis s/p TAVR  * TTE (8/22): EF 50-55%, unable to evaluate WMA due to limited study; mean AV gradient 14 mmHg  - Appears compensated without signs of volume overload  - Low threshold to d/c IV fluids for resp distress     Peripheral neuropathy  - PTA gabapentin has been decreased from 600 to 300 mg qhs in setting of FRED     Otherwise chronic and stable medical conditions  Hx of right TKA on 7/25/2022  COPD  Dyslipidemia  Hx of lung cancer s/p wedge resection (2019)  Hx of non-Hodgkin's Lymphoma, in remission  MGUS  BPH     Diet:   DVT Prophylaxis: Pneumatic Compression Devices  Suazo Catheter: Not present  Code Status: Full Code         Disposition:   --Will need further GI work-up.  The patient's care was discussed with the his wife Roberta over the telephone 8/26       Loni Yuen MD, MD  [phone](p)  [phone]( c)

## 2022-08-28 NOTE — PROGRESS NOTES
"   08/28/22 1036   General Information   Onset of Illness/Injury or Date of Surgery 08/23/22   Referring Physician Loni Yuen MD   Patient/Family Therapy Goal Statement (SLP) unable to state   Pertinent History of Current Problem \"Hermilo Velasquez is a 89 year old male with hx of known colonic AVMs, chronic a-fib and recurrent PE on chronic AC with apixaban, aortic stenosis s/p TAVR, COPD, and cognitive impairment who was admitted on 8/23/2022 for acute anemia due to suspected GI bleed\" New infiltrates on chest x-ray   General Observations Pt initially asleep, but easily aroused and agreeable to PO intake. Appeared agitated when attempting to raise HOB. Suboptimal positioning for PO intake. RN reported pt very impulsive with liquids and has a delayed cough. Difficulty with solid foods d/t dentition.   Past History of Dysphagia no documented dysphagia   Type of Evaluation   Type of Evaluation Swallow Evaluation   Oral Motor   Oral Musculature unable to assess due to poor participation/comprehension   Dentition (Oral Motor)   Dentition (Oral Motor) significant number of missing teeth;dental appliance/dentures   Dental Appliance/Denture (Oral Motor) upper   Facial Symmetry (Oral Motor)   Facial Symmetry (Oral Motor) unable/difficult to assess   Lip Function (Oral Motor)   Lip Sensitivity (Oral Motor) left lower lip decreased;right lower lip decreased  (did not sense residue from pudding on lower lips)   Vocal Quality/Secretion Management (Oral Motor)   Vocal Quality (Oral Motor) hoarse;wet/gurgly   Comment, Vocal Quality/Secretion Management (Oral Motor) Baseline cough x2   General Swallowing Observations   Current Diet/Method of Nutritional Intake (General Swallowing Observations, NIS) regular diet;thin liquids (level 0)   Swallowing Evaluation Clinical swallow evaluation   Clinical Swallow Evaluation   Feeding Assistance dependent   Clinical Swallow Evaluation Textures Trialed thin liquids;mildly thick " liquids;pureed;solid foods   Clinical Swallow Eval: Thin Liquid Texture Trial   Mode of Presentation, Thin Liquids straw  (unable by spoon or cup)   Volume of Liquid or Food Presented 4oz   Oral Phase of Swallow premature pharyngeal entry   Pharyngeal Phase of Swallow impaired;coughing/choking   Diagnostic Statement suspect spillage and delayed coughing on single sips and serial gulps   Clinical Swallow Eval: Mildly Thick Liquids   Mode of Presentation straw;fed by clinician   Volume Presented 4oz   Oral Phase WFL   Pharyngeal Phase coughing/choking   Diagnostic Statement Improved oral control and timing compared to thin liquids; delayed cough x1; unable to determine if related to swallow vs baseline   Clinical Swallow Evaluation: Puree Solid Texture Trial   Mode of Presentation, Puree spoon;fed by clinician   Volume of Puree Presented 1 cup of pudding   Oral Phase, Puree delayed AP movement   Pharyngeal Phase, Puree intact   Diagnostic Statement Oral delay; WFL   Clinical Swallow Evaluation: Solid Food Texture Trial   Mode of Presentation fed by clinician   Volume Presented 1/2 cracker   Oral Phase impaired mastication;residue in oral cavity;effortful AP movement;delayed AP movement   Oral Residue mid posterior tongue;left anterior lateral sulci;right anterior lateral sulci   Pharyngeal Phase impaired   Diagnostic Statement Limited oral awareness and attempt at mastication; oral residue cleared with pudding wash   Esophageal Phase of Swallow   Patient reports or presents with symptoms of esophageal dysphagia Yes   Esophageal comments Delayed coughing; ?pharyngeal, esophageal dysphagia   Swallowing Recommendations   Diet Consistency Recommendations minced & moist (level 5);mildly thick liquids (level 2)   Supervision Level for Intake 1:1 supervision needed   Mode of Delivery Recommendations bolus size, small;slow rate of intake;food moistened   Swallowing Maneuver Recommendations alternate food and liquid  intake;effortful (hard) swallow   Monitoring/Assistance Required (Eating/Swallowing) check mouth frequently for oral residue/pocketing;cue for finger/lingual sweep if oral pocketing present;monitor for cough or change in vocal quality with intake;stop eating activities when fatigue is present   Recommended Feeding/Eating Techniques (Swallow Eval) maintain upright sitting position for eating;maintain upright posture during/after eating for 30 minutes;minimize distractions during oral intake   Medication Administration Recommendations, Swallowing (SLP) whole in [facility] [place]   General Therapy Interventions   Planned Therapy Interventions Dysphagia Treatment   Dysphagia treatment Modified diet education;Instruction of safe swallow strategies   Clinical Impression   Criteria for Skilled Therapeutic Interventions Met (SLP Eval) Yes, treatment indicated   SLP Diagnosis Dysphagia   Clinical Impression Comments Mild to moderate dysphagia on clinical swallow evaluation d/t AMS and suspected pharyngeal/esophageal dysphagia. Positioning and behavior also impacting risk of aspiration. Pt not currently appropriate for Video Swallow Study. Will downgrade diet and continue to follow to return to baseline/complete further assessment as able   SLP Discharge Planning   SLP Discharge Recommendation Transitional Care Facility   SLP Rationale for DC Rec Swallow function is below baseline   SLP Brief overview of current status  Downgrade to IDDSI level 5 (minced and moist) with mildly thick liquids (straws ok); encourage pt to be in a chair for meals and fully upright if able; continue 1:1 assist and slow rate of intake    Total Evaluation Time   Total Evaluation Time (Minutes) 20   SLP Goals   Therapy Frequency (SLP Eval) 5 times/wk   SLP Goals Swallow       Psychosocial Support   Trust Relationship/Rapport care explained

## 2022-08-28 NOTE — PLAN OF CARE
Goal Outcome Evaluation:    Pt more awake toward the end of shift. Open eyes spontaneously for longer period of time without falling back to sleep. Able to talk and more engage with staff. Up to chair for 6hrs with A2 gait belt and walker back to bed. SP following. Need to sit up right and up chair for meal d/t risk of aspiration. BP low this am, still on 2L O2. Incontinence of urine- male catheter changed. Continue to monitor.

## 2022-08-28 NOTE — PLAN OF CARE
"[id number]. Patient is confused, neuro/cog difficult to assess. Patient occasionally speaking and opening both eyes. However, patient still slept majority of shift. No complaints of pain. Patient is Ax2,T/R. Minimal coughing this shift, not as productive. Patient is a total feed. PIV infusing at 75ml/hr. External catheter in place. Patient refused medications, yelled \"no\". Continue to monitor.     "

## 2022-08-29 ENCOUNTER — APPOINTMENT (OUTPATIENT)
Dept: SPEECH THERAPY | Facility: CLINIC | Age: 87
DRG: 377 | End: 2022-08-29
Payer: COMMERCIAL

## 2022-08-29 ENCOUNTER — APPOINTMENT (OUTPATIENT)
Dept: OCCUPATIONAL THERAPY | Facility: CLINIC | Age: 87
DRG: 377 | End: 2022-08-29
Attending: INTERNAL MEDICINE
Payer: COMMERCIAL

## 2022-08-29 ENCOUNTER — APPOINTMENT (OUTPATIENT)
Dept: PHYSICAL THERAPY | Facility: CLINIC | Age: 87
DRG: 377 | End: 2022-08-29
Attending: INTERNAL MEDICINE
Payer: COMMERCIAL

## 2022-08-29 LAB
ANION GAP SERPL CALCULATED.3IONS-SCNC: 4 MMOL/L (ref 3–14)
BUN SERPL-MCNC: 17 MG/DL (ref 7–30)
CALCIUM SERPL-MCNC: 8.4 MG/DL (ref 8.5–10.1)
CHLORIDE BLD-SCNC: 116 MMOL/L (ref 94–109)
CO2 SERPL-SCNC: 26 MMOL/L (ref 20–32)
CREAT SERPL-MCNC: 1.08 MG/DL (ref 0.66–1.25)
GFR SERPL CREATININE-BSD FRML MDRD: 66 ML/MIN/1.73M2
GLUCOSE BLD-MCNC: 112 MG/DL (ref 70–99)
HGB BLD-MCNC: 7.7 G/DL (ref 13.3–17.7)
HGB BLD-MCNC: 8.4 G/DL (ref 13.3–17.7)
HGB BLD-MCNC: 8.7 G/DL (ref 13.3–17.7)
POTASSIUM BLD-SCNC: 3.4 MMOL/L (ref 3.4–5.3)
SODIUM SERPL-SCNC: 146 MMOL/L (ref 133–144)

## 2022-08-29 PROCEDURE — 999N000127 HC STATISTIC PERIPHERAL IV START W US GUIDANCE

## 2022-08-29 PROCEDURE — 92526 ORAL FUNCTION THERAPY: CPT | Mod: GN

## 2022-08-29 PROCEDURE — 120N000001 HC R&B MED SURG/OB

## 2022-08-29 PROCEDURE — 99233 SBSQ HOSP IP/OBS HIGH 50: CPT | Performed by: HOSPITALIST

## 2022-08-29 PROCEDURE — 97165 OT EVAL LOW COMPLEX 30 MIN: CPT | Mod: GO | Performed by: OCCUPATIONAL THERAPIST

## 2022-08-29 PROCEDURE — 36415 COLL VENOUS BLD VENIPUNCTURE: CPT | Performed by: INTERNAL MEDICINE

## 2022-08-29 PROCEDURE — 250N000011 HC RX IP 250 OP 636: Performed by: INTERNAL MEDICINE

## 2022-08-29 PROCEDURE — 250N000013 HC RX MED GY IP 250 OP 250 PS 637: Performed by: INTERNAL MEDICINE

## 2022-08-29 PROCEDURE — 36415 COLL VENOUS BLD VENIPUNCTURE: CPT | Performed by: HOSPITALIST

## 2022-08-29 PROCEDURE — 250N000011 HC RX IP 250 OP 636: Performed by: HOSPITALIST

## 2022-08-29 PROCEDURE — 85018 HEMOGLOBIN: CPT | Performed by: HOSPITALIST

## 2022-08-29 PROCEDURE — 97116 GAIT TRAINING THERAPY: CPT | Mod: GP

## 2022-08-29 PROCEDURE — 97535 SELF CARE MNGMENT TRAINING: CPT | Mod: GO | Performed by: OCCUPATIONAL THERAPIST

## 2022-08-29 PROCEDURE — 97162 PT EVAL MOD COMPLEX 30 MIN: CPT | Mod: GP

## 2022-08-29 PROCEDURE — 97530 THERAPEUTIC ACTIVITIES: CPT | Mod: GP

## 2022-08-29 PROCEDURE — 80048 BASIC METABOLIC PNL TOTAL CA: CPT | Performed by: INTERNAL MEDICINE

## 2022-08-29 RX ORDER — FAMOTIDINE 20 MG/1
20 TABLET, FILM COATED ORAL DAILY
Status: DISCONTINUED | OUTPATIENT
Start: 2022-08-29 | End: 2022-09-08 | Stop reason: HOSPADM

## 2022-08-29 RX ADMIN — CEFEPIME HYDROCHLORIDE 2 G: 2 INJECTION, POWDER, FOR SOLUTION INTRAVENOUS at 12:37

## 2022-08-29 RX ADMIN — GABAPENTIN 300 MG: 300 CAPSULE ORAL at 21:21

## 2022-08-29 RX ADMIN — Medication 12.5 MG: at 10:26

## 2022-08-29 RX ADMIN — DOXYCYCLINE 100 MG: 100 INJECTION, POWDER, LYOPHILIZED, FOR SOLUTION INTRAVENOUS at 19:11

## 2022-08-29 RX ADMIN — Medication 12.5 MG: at 21:21

## 2022-08-29 RX ADMIN — ALFUZOSIN HYDROCHLORIDE 10 MG: 10 TABLET, EXTENDED RELEASE ORAL at 10:25

## 2022-08-29 RX ADMIN — FLUTICASONE FUROATE AND VILANTEROL TRIFENATATE 1 PUFF: 200; 25 POWDER RESPIRATORY (INHALATION) at 10:19

## 2022-08-29 RX ADMIN — CEFEPIME HYDROCHLORIDE 2 G: 2 INJECTION, POWDER, FOR SOLUTION INTRAVENOUS at 23:34

## 2022-08-29 RX ADMIN — DOXYCYCLINE 100 MG: 100 INJECTION, POWDER, LYOPHILIZED, FOR SOLUTION INTRAVENOUS at 04:05

## 2022-08-29 RX ADMIN — UMECLIDINIUM 1 PUFF: 62.5 AEROSOL, POWDER ORAL at 10:17

## 2022-08-29 RX ADMIN — QUETIAPINE FUMARATE 50 MG: 25 TABLET ORAL at 21:21

## 2022-08-29 RX ADMIN — FAMOTIDINE 20 MG: 20 TABLET ORAL at 12:36

## 2022-08-29 RX ADMIN — ATORVASTATIN CALCIUM 10 MG: 10 TABLET, FILM COATED ORAL at 10:25

## 2022-08-29 ASSESSMENT — ACTIVITIES OF DAILY LIVING (ADL)
ADLS_ACUITY_SCORE: 44
ADLS_ACUITY_SCORE: 44
ADLS_ACUITY_SCORE: 42
ADLS_ACUITY_SCORE: 44
ADLS_ACUITY_SCORE: 42
PREVIOUS_RESPONSIBILITIES: DRIVING
ADLS_ACUITY_SCORE: 36
ADLS_ACUITY_SCORE: 42
ADLS_ACUITY_SCORE: 42
ADLS_ACUITY_SCORE: 44
ADLS_ACUITY_SCORE: 44
ADLS_ACUITY_SCORE: 42
ADLS_ACUITY_SCORE: 44

## 2022-08-29 NOTE — PLAN OF CARE
[id number]. A&O to self, VSS on 2L O2. Patient denies pain. More alert this shift and conversing. Patient is up with Ax2 gb/wk. Needs to sit up right and in chair for meals for risk of aspiration. Patient is incontinent of B&B,no external catheter because patient continues to remove it. Continue to monitor.

## 2022-08-29 NOTE — PROGRESS NOTES
08/29/22 1050   Quick Adds   Type of Visit Initial PT Evaluation   Living Environment   People in Home spouse   Current Living Arrangements house   Home Accessibility stairs to enter home;stairs within home   Number of Stairs, Main Entrance 1   Stair Railings, Main Entrance railing on right side (ascending)   Number of Stairs, Within Home, Primary greater than 10 stairs   Stair Railings, Within Home, Primary railing on left side (ascending)   Transportation Anticipated family or friend will provide   Living Environment Comments Per pt report, patient independent with all mobility and cares with use of front wheeled walker at baseline.   Self-Care   Usual Activity Tolerance good   Current Activity Tolerance fair   Regular Exercise No   Equipment Currently Used at Home walker, rolling   Fall history within last six months   (pt questionable historian)   General Information   Onset of Illness/Injury or Date of Surgery 08/23/22   Referring Physician Carlos Kathleen MD   Patient/Family Therapy Goals Statement (PT) return home   Pertinent History of Current Problem (include personal factors and/or comorbidities that impact the POC) Pt is 88 y/o M admitted on 8/23/2022 for acute anemia due to suspected GI bleed. PMH: colonic AVMs, chronic a-fib and recurrent PE on chronic AC with apixaban, aortic stenosis s/p TAVR, COPD, and cognitive impairment.   Existing Precautions/Restrictions fall   General Observations Pt sitting in chair upon arrival.   Cognition   Affect/Mental Status (Cognition) low arousal/lethargic   Orientation Status (Cognition) verbal cues/prompts needed for orientation;oriented x 4  (Pt was [place])   Follows Commands (Cognition) increased processing time needed;delayed response/completion;verbal cues/prompting required;physical/tactile prompts required;repetition of directions required   Pain Assessment   Patient Currently in Pain No   Posture    Posture Forward head position;Kyphosis   Range of Motion  (ROM)   Range of Motion ROM is WFL   ROM Comment not formally assessed.   Strength (Manual Muscle Testing)   Strength Comments Pt performed B LAQ, grossly 3/5 BLE   Bed Mobility   Comment, (Bed Mobility) not tested   Transfers   Comment, (Transfers) sit<>stand ModA x2   Gait/Stairs (Locomotion)   Distance in Feet (Required for LE Total Joints) 10', 20'   Comment, (Gait/Stairs) Pt amb 10' with FWW and CGA   Balance   Balance Comments not formally assessed, no LOB, good seated balance   Sensory Examination   Sensory Perception Comments pt noted peripheral neuropathy in RLE   Clinical Impression   Criteria for Skilled Therapeutic Intervention Yes, treatment indicated   PT Diagnosis (PT) Impaired funcitonal mobility   Influenced by the following impairments reduced activity tolerance, weakness, decreased sensation   Functional limitations due to impairments impaired functional mobility, gait, transfers, and stair navigation requiring FWW and assist x1-2   Clinical Presentation (PT Evaluation Complexity) Stable/Uncomplicated   Clinical Presentation Rationale PMH, current presentation, social support, and clinical judgment   Clinical Decision Making (Complexity) low complexity   Planned Therapy Interventions (PT) bed mobility training;gait training;transfer training;stair training   Risk & Benefits of therapy have been explained patient   PT Discharge Planning   PT Discharge Recommendation (DC Rec) Transitional Care Facility   PT Rationale for DC Rec Pt is currently below baseline. Pt requires assist x1-2 with all functional mobility with use of FWW. At this time, recommend TCU based on current mobility and cognitive status. If pt were to go home, pt would require assist x1-2 with FWW with all functional mobility.   Plan of Care Review   Plan of Care Reviewed With patient   Total Evaluation Time   Total Evaluation Time (Minutes) 10   Physical Therapy Goals   PT Frequency Daily   PT Predicted Duration/Target Date for Goal  Attainment 08/30/22   PT Goals Bed Mobility;Transfers;Gait;Stairs   PT: Bed Mobility Supervision/stand-by assist;Supine to/from sit   PT: Transfers Minimal assist;Assistive device;Sit to/from stand   PT: Gait Supervision/stand-by assist;Assistive device;50 feet   PT: Stairs Minimal assist;Assistive device;4 stairs;Rail on left

## 2022-08-29 NOTE — PLAN OF CARE
Goal Outcome Evaluation:    Date & Shift: 8/29 [id number]   Diagnosis:  gastrointestinal hemorrhage    Procedure:  na   Orientation:  alert to self and time - and place intermittently; pleasant this shift   VS/O2: vss on 2L o2   Activity:  a2 gb walker - walked in room with PT   Diet:  minced and moist and mildly thick liquids - needs encouragement to drink and eat. 1:1 supervision while eating   Pain Management:  denies   Bowel & Bladder:  incontinent   Skin:  skin tear to R hand   Tele:  na   IV: ivsl - intermittent iv abx   Consults:    Discharge:  pending   Other:  patient was up in chair this shift; does IS well with encouragement

## 2022-08-29 NOTE — PROGRESS NOTES
[facility]    Hospitalist Progress Note    Interval History   Patient is significantly improved with mentation and is having better conversations.  Still only oriented to person.  Patient did say that he was having some shortness of breath and cough with clear sputum production.  He is refusing to have thickened liquids as he does not like the consistency.  Has not been having good oral intake since diet has been changed.    -Data reviewed today: I reviewed all new labs and imaging results over the last 24 hours. I personally reviewed no images or EKG's today.    Physical Exam   Temp: 98.4  F (36.9  C) Temp src: Oral BP: (!) 168/60 Pulse: 93   Resp: 18 SpO2: 96 % O2 Device: Nasal cannula Oxygen Delivery: 2 LPM  Vitals:    08/27/22 0600 08/28/22 0659 08/29/22 0620   Weight: 72 kg (158 lb 11.7 oz) 71 kg (156 lb 8.4 oz) 71.3 kg (157 lb 3 oz)     Vital Signs with Ranges  Temp:  [98  F (36.7  C)-98.4  F (36.9  C)] 98.4  F (36.9  C)  Pulse:  [83-93] 93  Resp:  [18] 18  BP: (119-168)/(47-60) 168/60  SpO2:  [96 %-99 %] 96 %  I/O last 3 completed shifts:  In: 579 [P.O.:405; I.V.:174]  Out: 650 [Urine:650]    Physical Exam  Constitutional:       Appearance: He is ill-appearing.      Comments: More awake and alert today.  Afebrile.  Following commands.   HENT:      Head: Normocephalic.   Eyes:      Pupils: Pupils are equal, round, and reactive to light.   Cardiovascular:      Rate and Rhythm: Normal rate and regular rhythm.      Pulses: Normal pulses.      Heart sounds: Normal heart sounds.   Pulmonary:      Effort: Pulmonary effort is normal. No respiratory distress.      Breath sounds: Normal breath sounds.   Abdominal:      General: Abdomen is flat. Bowel sounds are normal. There is no distension.      Tenderness: There is no abdominal tenderness. There is no guarding.   Musculoskeletal:      Comments: Right knee swollen from recent surgery with right lower extremity edema.  Surgical site appears  stable.   Skin:     General: Skin is warm and dry.   Psychiatric:         Mood and Affect: Mood normal.           Medications       alfuzosin ER  10 mg Oral Daily     atorvastatin  10 mg Oral Daily     ceFEPIme (MAXIPIME) IV  2 g Intravenous Q12H     doxycycline (VIBRAMYCIN) IV  100 mg Intravenous Q12H     famotidine  20 mg Oral Daily     fluticasone-vilanterol  1 puff Inhalation Daily     gabapentin  300 mg Oral At Bedtime     metoprolol succinate ER  12.5 mg Oral BID     polyethylene glycol  17 g Oral Daily     QUEtiapine  50 mg Oral At Bedtime     sodium chloride (PF)  3 mL Intracatheter Q8H     umeclidinium  1 puff Inhalation Daily       Data   Recent Labs   Lab 08/29/22  0619 08/28/22  2257 08/28/22  1534 08/28/22  0620 08/27/22  2245 08/27/22  0829 08/27/22  0613 08/26/22  1557 08/26/22  0620 08/24/22  0411 08/23/22  1712   WBC  --   --   --   --   --   --  7.4  --  7.4  --  8.5   HGB 7.7* 7.7* 8.3* 7.8* 8.1*   < > 8.6*  8.6*   < > 8.0*  8.0*   < > 5.3*   MCV  --   --   --   --   --   --  96  --  97  --  97   PLT  --   --   --   --   --   --  188  --  186  --  225   INR  --   --   --   --   --   --   --   --   --   --  1.65*   *  --   --  148* 148*  --  147*  --  144   < > 142   POTASSIUM 3.4  --   --  3.8 4.2  --  4.5  --  4.0   < > 3.9   CHLORIDE 116*  --   --  119* 118*  --  119*  --  118*   < > 112*   CO2 26  --   --  24 23  --  20  --  23   < > 24   BUN 17  --   --  28 33*  --  33*  --  31*   < > 50*   CR 1.08  --   --  1.43* 1.60*  --  1.79*  --  1.91*   < > 2.35*   ANIONGAP 4  --   --  5 7  --  8  --  3   < > 6   AMRITA 8.4*  --   --  8.0* 8.4*  --  8.6  --  8.6   < > 8.5   *  --   --  124* 132*   < > 73  --  94   < > 95   ALBUMIN  --   --   --   --  2.3*  --   --   --   --   --  2.6*   PROTTOTAL  --   --   --   --  5.6*  --   --   --   --   --  6.1*   BILITOTAL  --   --   --   --  0.6  --   --   --   --   --  0.2   ALKPHOS  --   --   --   --  97  --   --   --   --   --  112   ALT  --   --    --   --  12  --   --   --   --   --  22   AST  --   --   --   --  15  --   --   --   --   --  24    < > = values in this interval not displayed.       No results found for this or any previous visit (from the past 24 hour(s)).      Assessment & Plan   Hermilo Velasquez is a 89 year old male with hx of known colonic AVMs, chronic a-fib and recurrent PE on chronic AC with apixaban, aortic stenosis s/p TAVR, COPD, and cognitive impairment who was admitted on 8/23/2022 for acute anemia due to suspected GI bleed     Acute blood loss on chronic anemia due to suspected GI bleed  Hx colonic AVMs  * Referred to ED after routine blood work revealed a Hgb 5.8  * No obvious history of GI bleed however patient has history of AVMs in his colon which was treated with APC on 4/12/2022; also on on chronic AC with apixaban  * Transfused 1u pRBCs in the ED  * [facility] consulted on admission  - Received additional 1u pRBCs earlier this morning, 8/24  - Holding PTA apixaban  - Not on PPI due to allergy  - Continues on PTA famotidine 40 mg BID  - [facility] GI following,  EGD completed : no endoscopic abnormalities noted, colonoscopy deferred due to agitated delirium and hgb stability    Hospital-acquired pneumonia with possible aspiration  -Patient had a worsening mental status yesterday.  ABG did show some acidosis.  Chest x-ray showed bilateral new infiltrates concerning for pneumonia with right greater than left.  Could have a component of aspiration.  Was started on IV cefepime and azithromycin.  Improved today.  Speech eval showed esophageal dysphagia.  Recommended one-to-one supervision with minced and moist food with mildly thick liquids.    Tried small amount of thin liquids this morning since he was not willing to take thick liquids but he immediately had small aspiration and was coughing for prolonged duration.  We will continue to encourage oral intake but if he is unable to keep up with the fluid needs then will have to  consider tube feeds and discussion with family regarding this.    Cognitive impairment  Delirium  * Patient lives with his wife who is his primary caregiver. Also has home care.  - patient had prior delirium episodes when he had surgery  - PT/OT consults requested but too agitated  - sitter + restraints  - increase seroquel to 50 mg at bedtime and 25 mg every 6 hours for agitation.  Patient has been quite sleepy since yesterday.  - UA negative  -Delirium and cognitive impairment has improved but I think he has some baseline cognitive dysfunction.  Hypernatremia is improved with D5 half NS.  We will discontinue fluids and evaluate his fluid intake orally.    FRED on CKD stage IIIa, Improved  * Creatinine up to 2.35 from 0.87 on 7/30/22, although creatinine has previously fluctuated in 1.6-1.9 range  * Suspected pre-renal state due to blood loss, borderline hypotension (noted on arrival), home torsemide, and recent use of Bactrim for UTI  * Improved with IV fluids and blood transfusion  -Acute renal failure resolved.  Creatinine back to normal at 1.08.  IV fluids discontinued.  -We will continue to hold torsemide for today and consider restarting tomorrow.    Hypernatremia-likely secondary to reduced oral intake.   sodium improved to 146 today.  Will reevaluate after stopping IV fluids.  Encourage oral intake.  He is having trouble taking thickened liquids.     Chronic atrial fibrillation  Hx of pulmonary embolism  - Continues on PTA metoprolol 12.5 mg BID  - Holding PTA apixaban in setting of suspected GI bleed  - Initiated discussion regarding risks/benefits of anticoagulation with patient's wife. Conversation with patient limited due to apparent cognitive impairment. Patients wife agrees on holding AC for the time being, but would like to resume conversation re: long term AC with patient's cardiologist, who he has an appointment with on Fri.      Hx aortic stenosis s/p TAVR  * TTE (8/22): EF 50-55%, unable to  evaluate WMA due to limited study; mean AV gradient 14 mmHg  - Appears compensated without signs of volume overload  - Low threshold to d/c IV fluids for resp distress     Peripheral neuropathy  - PTA gabapentin has been decreased from 600 to 300 mg qhs in setting of FRED     Otherwise chronic and stable medical conditions  Hx of right TKA on 7/25/2022  COPD  Dyslipidemia  Hx of lung cancer s/p wedge resection (2019)  Hx of non-Hodgkin's Lymphoma, in remission  MGUS  BPH     Diet:   Moist diet with thickened liquids  DVT Prophylaxis: Pneumatic Compression Devices  Suazo Catheter: Not present  Code Status: Full Code         Disposition:   --Will need further GI work-up.  The patient's care was discussed with the his wife Roberta over the telephone 8/26       Loni Yuen MD, MD  [phone](p)  [phone]( c)

## 2022-08-29 NOTE — PROGRESS NOTES
"   08/29/22 1536   Quick Adds   Type of Visit Initial Occupational Therapy Evaluation   Living Environment   People in Home spouse   Current Living Arrangements house   Home Accessibility stairs within home   Number of Stairs, Main Entrance 1   Stair Railings, Main Entrance railings safe and in good condition   Number of Stairs, Within Home, Primary greater than 10 stairs   Transportation Anticipated family or friend will provide   Living Environment Comments Patient lives in two story house with 1 step to enter with grab bar. Patient has 10 steps to basement with unilateral handrail in order to shower. Patient lives on main level   Self-Care   Usual Activity Tolerance good   Current Activity Tolerance fair   Equipment Currently Used at Home walker, rolling   Activity/Exercise/Self-Care Comment pt states he drives, but his wife can too. wife cooks and helps him manage medications   Instrumental Activities of Daily Living (IADL)   Previous Responsibilities driving   General Information   Onset of Illness/Injury or Date of Surgery 08/23/22   Referring Physician Juan   Patient/Family Therapy Goal Statement (OT) none stated   Additional Occupational Profile Info/Pertinent History of Current Problem Hermilo Velasquez is a 89 year old male with hx of known colonic AVMs, chronic a-fib and recurrent PE on chronic AC with apixaban, aortic stenosis s/p TAVR, COPD, and cognitive impairment who was admitted on 8/23/2022 for acute anemia due to suspected GI bleed\" New infiltrates on chest x-ray   Performance Patterns (Routines, Roles, Habits) none   Existing Precautions/Restrictions fall   Limitations/Impairments hearing;safety/cognitive   General Observations and Info pt sleeping in chair. woke and agreeable to OT   Cognitive Status Examination   Orientation Status person;place  (month and year)   Affect/Mental Status (Cognitive) confused   Cognitive Status Comments very hard of hearing   Visual Perception   Visual " Impairment/Limitations WFL   Sensory   Sensory Comments reports B LE neuropathy   Pain Assessment   Patient Currently in Pain No   Integumentary/Edema   Integumentary/Edema no deficits were identifed   Posture   Posture forward head position   Range of Motion Comprehensive   General Range of Motion no range of motion deficits identified   Strength Comprehensive (MMT)   General Manual Muscle Testing (MMT) Assessment no strength deficits identified   Muscle Tone Assessment   Muscle Tone Quick Adds No deficits were identified   Coordination   Upper Extremity Coordination No deficits were identified   Transfers   Transfers sit-stand transfer   Sit-Stand Transfer   Sit-Stand Luna (Transfers) minimum assist (75% patient effort)   Balance   Balance Comments defer to PT   Activities of Daily Living   BADL Assessment/Intervention lower body dressing   Lower Body Dressing Assessment/Training   Luna Level (Lower Body Dressing) unable to perform  (not following due to cognition)   Clinical Impression   Criteria for Skilled Therapeutic Interventions Met (OT) Yes, treatment indicated   OT Diagnosis decreased I with ADL's and functional mobility   OT Problem List-Impairments impacting ADL problems related to;activity tolerance impaired;balance;cognition;mobility   Assessment of Occupational Performance 1-3 Performance Deficits   Identified Performance Deficits decreased dressing, bathing, home mgmt   Planned Therapy Interventions (OT) ADL retraining;IADL retraining;balance training;cognition;strengthening;transfer training;progressive activity/exercise   Clinical Decision Making Complexity (OT) low complexity   Risk & Benefits of therapy have been explained evaluation/treatment results reviewed;care plan/treatment goals reviewed;risks/benefits reviewed;current/potential barriers reviewed;participants voiced agreement with care plan;participants included;patient   OT Discharge Planning   OT Discharge Recommendation  (DC Rec) Transitional Care Facility   OT Rationale for DC Rec pt is below baseline, needing A with all mobility and demo's decreased cognition/confusion. difficulty following commands. pt would  benefit from continued therapy at TCU prior to returning home with A fromwife   Total Evaluation Time (Minutes)   Total Evaluation Time (Minutes) 10   OT Goals   Therapy Frequency (OT) 5 times/wk   OT Predicted Duration/Target Date for Goal Attainment 09/05/22   OT Goals Hygiene/Grooming;Lower Body Dressing;Toilet Transfer/Toileting;Cognition   OT: Hygiene/Grooming modified independent;while standing   OT: Lower Body Dressing Modified independent;using adaptive equipment   OT: Toilet Transfer/Toileting Modified independent;toilet transfer;cleaning and garment management   OT: Cognitive Patient/caregiver will verbalize understanding of cognitive assessment results/recommendations as needed for safe discharge planning

## 2022-08-30 ENCOUNTER — APPOINTMENT (OUTPATIENT)
Dept: PHYSICAL THERAPY | Facility: CLINIC | Age: 87
DRG: 377 | End: 2022-08-30
Attending: HOSPITALIST
Payer: COMMERCIAL

## 2022-08-30 ENCOUNTER — APPOINTMENT (OUTPATIENT)
Dept: OCCUPATIONAL THERAPY | Facility: CLINIC | Age: 87
DRG: 377 | End: 2022-08-30
Payer: COMMERCIAL

## 2022-08-30 ENCOUNTER — APPOINTMENT (OUTPATIENT)
Dept: SPEECH THERAPY | Facility: CLINIC | Age: 87
DRG: 377 | End: 2022-08-30
Payer: COMMERCIAL

## 2022-08-30 LAB
ANION GAP SERPL CALCULATED.3IONS-SCNC: 5 MMOL/L (ref 3–14)
BUN SERPL-MCNC: 17 MG/DL (ref 7–30)
CALCIUM SERPL-MCNC: 8.5 MG/DL (ref 8.5–10.1)
CHLORIDE BLD-SCNC: 119 MMOL/L (ref 94–109)
CO2 SERPL-SCNC: 25 MMOL/L (ref 20–32)
CREAT SERPL-MCNC: 1.11 MG/DL (ref 0.66–1.25)
GFR SERPL CREATININE-BSD FRML MDRD: 63 ML/MIN/1.73M2
GLUCOSE BLD-MCNC: 103 MG/DL (ref 70–99)
HGB BLD-MCNC: 8.3 G/DL (ref 13.3–17.7)
POTASSIUM BLD-SCNC: 3.7 MMOL/L (ref 3.4–5.3)
SODIUM SERPL-SCNC: 149 MMOL/L (ref 133–144)

## 2022-08-30 PROCEDURE — 120N000001 HC R&B MED SURG/OB

## 2022-08-30 PROCEDURE — 97116 GAIT TRAINING THERAPY: CPT | Mod: GP

## 2022-08-30 PROCEDURE — 92526 ORAL FUNCTION THERAPY: CPT | Mod: GN | Performed by: SPEECH-LANGUAGE PATHOLOGIST

## 2022-08-30 PROCEDURE — 97535 SELF CARE MNGMENT TRAINING: CPT | Mod: GO | Performed by: OCCUPATIONAL THERAPIST

## 2022-08-30 PROCEDURE — 36415 COLL VENOUS BLD VENIPUNCTURE: CPT | Performed by: INTERNAL MEDICINE

## 2022-08-30 PROCEDURE — 82310 ASSAY OF CALCIUM: CPT | Performed by: INTERNAL MEDICINE

## 2022-08-30 PROCEDURE — 250N000011 HC RX IP 250 OP 636: Performed by: HOSPITALIST

## 2022-08-30 PROCEDURE — 250N000011 HC RX IP 250 OP 636: Performed by: INTERNAL MEDICINE

## 2022-08-30 PROCEDURE — 250N000013 HC RX MED GY IP 250 OP 250 PS 637: Performed by: INTERNAL MEDICINE

## 2022-08-30 PROCEDURE — 97530 THERAPEUTIC ACTIVITIES: CPT | Mod: GP

## 2022-08-30 PROCEDURE — 250N000013 HC RX MED GY IP 250 OP 250 PS 637: Performed by: HOSPITALIST

## 2022-08-30 PROCEDURE — 99233 SBSQ HOSP IP/OBS HIGH 50: CPT | Performed by: HOSPITALIST

## 2022-08-30 PROCEDURE — 85018 HEMOGLOBIN: CPT | Performed by: HOSPITALIST

## 2022-08-30 PROCEDURE — 258N000001 HC RX 258: Performed by: NURSE PRACTITIONER

## 2022-08-30 PROCEDURE — 258N000001 HC RX 258: Performed by: HOSPITALIST

## 2022-08-30 RX ADMIN — CEFEPIME HYDROCHLORIDE 2 G: 2 INJECTION, POWDER, FOR SOLUTION INTRAVENOUS at 23:25

## 2022-08-30 RX ADMIN — ACETAMINOPHEN 650 MG: 325 TABLET ORAL at 13:40

## 2022-08-30 RX ADMIN — ATORVASTATIN CALCIUM 10 MG: 10 TABLET, FILM COATED ORAL at 10:25

## 2022-08-30 RX ADMIN — POLYETHYLENE GLYCOL 3350 17 G: 17 POWDER, FOR SOLUTION ORAL at 10:29

## 2022-08-30 RX ADMIN — CEFEPIME HYDROCHLORIDE 2 G: 2 INJECTION, POWDER, FOR SOLUTION INTRAVENOUS at 11:33

## 2022-08-30 RX ADMIN — DOXYCYCLINE 100 MG: 100 INJECTION, POWDER, LYOPHILIZED, FOR SOLUTION INTRAVENOUS at 17:23

## 2022-08-30 RX ADMIN — DEXTROSE AND SODIUM CHLORIDE: 5; 450 INJECTION, SOLUTION INTRAVENOUS at 21:10

## 2022-08-30 RX ADMIN — FAMOTIDINE 20 MG: 20 TABLET ORAL at 10:25

## 2022-08-30 RX ADMIN — DOXYCYCLINE 100 MG: 100 INJECTION, POWDER, LYOPHILIZED, FOR SOLUTION INTRAVENOUS at 05:08

## 2022-08-30 RX ADMIN — ACETAMINOPHEN 650 MG: 325 TABLET ORAL at 19:23

## 2022-08-30 RX ADMIN — QUETIAPINE FUMARATE 50 MG: 25 TABLET ORAL at 21:10

## 2022-08-30 RX ADMIN — GABAPENTIN 300 MG: 300 CAPSULE ORAL at 21:10

## 2022-08-30 RX ADMIN — ALFUZOSIN HYDROCHLORIDE 10 MG: 10 TABLET, EXTENDED RELEASE ORAL at 10:25

## 2022-08-30 RX ADMIN — DEXTROSE AND SODIUM CHLORIDE: 5; 450 INJECTION, SOLUTION INTRAVENOUS at 10:25

## 2022-08-30 RX ADMIN — Medication 12.5 MG: at 10:25

## 2022-08-30 ASSESSMENT — ACTIVITIES OF DAILY LIVING (ADL)
ADLS_ACUITY_SCORE: 36
ADLS_ACUITY_SCORE: 40
ADLS_ACUITY_SCORE: 36

## 2022-08-30 NOTE — PLAN OF CARE
Pt A&O to self only. VSS on 3L of O2 overnight with HOB up at 30-45 degrees. PIV SL with int abx. Tolerating combination mined and moist diet. Assist of 2 with walker and gait belt. Incontinent of B/B, external catheter is removed. Denies pain and N/V. Continue plan of care.

## 2022-08-30 NOTE — PROGRESS NOTES
[facility]    Hospitalist Progress Note    Interval History   Patient is more drowsy this morning but arousable and intermittently answers questions appropriately.  Continues to have difficulty keeping up with oral intake.  Tried clear liquids yesterday and he had significant bout of coughing and choking on this.  Has worsening hyponatremia again today.    -Data reviewed today: I reviewed all new labs and imaging results over the last 24 hours. I personally reviewed no images or EKG's today.    Physical Exam   Temp: 99  F (37.2  C) Temp src: Axillary BP: 121/62 Pulse: 97   Resp: 18 SpO2: 95 % O2 Device: Nasal cannula Oxygen Delivery: 3 LPM  Vitals:    08/28/22 0659 08/29/22 0620 08/30/22 0600   Weight: 71 kg (156 lb 8.4 oz) 71.3 kg (157 lb 3 oz) 70.7 kg (155 lb 13.8 oz)     Vital Signs with Ranges  Temp:  [99  F (37.2  C)-99.5  F (37.5  C)] 99  F (37.2  C)  Pulse:  [] 97  Resp:  [18] 18  BP: (116-130)/(55-62) 121/62  SpO2:  [94 %-96 %] 95 %  No intake/output data recorded.    Physical Exam  Constitutional:       Appearance: He is ill-appearing.      Comments: More awake and alert today.  Afebrile.  Following commands.   HENT:      Head: Normocephalic.   Eyes:      Pupils: Pupils are equal, round, and reactive to light.   Cardiovascular:      Rate and Rhythm: Normal rate and regular rhythm.      Pulses: Normal pulses.      Heart sounds: Normal heart sounds.   Pulmonary:      Effort: Pulmonary effort is normal. No respiratory distress.      Breath sounds: Normal breath sounds.   Abdominal:      General: Abdomen is flat. Bowel sounds are normal. There is no distension.      Tenderness: There is no abdominal tenderness. There is no guarding.   Musculoskeletal:      Comments: Right knee swollen from recent surgery with right lower extremity edema.  Surgical site appears stable.   Skin:     General: Skin is warm and dry.   Psychiatric:         Mood and Affect: Mood normal.            Medications       alfuzosin ER  10 mg Oral Daily     atorvastatin  10 mg Oral Daily     ceFEPIme  2 g Intravenous Q12H     doxycycline  100 mg Intravenous Q12H     famotidine  20 mg Oral Daily     fluticasone-vilanterol  1 puff Inhalation Daily     gabapentin  300 mg Oral At Bedtime     metoprolol succinate ER  12.5 mg Oral BID     polyethylene glycol  17 g Oral Daily     QUEtiapine  50 mg Oral At Bedtime     sodium chloride (PF)  3 mL Intracatheter Q8H     umeclidinium  1 puff Inhalation Daily       Data   Recent Labs   Lab 08/30/22  0631 08/29/22  2147 08/29/22  1503 08/29/22  0619 08/28/22  1534 08/28/22  0620 08/27/22  2245 08/27/22  0829 08/27/22  0613 08/26/22  1557 08/26/22  0620 08/24/22  0411 08/23/22  1712   WBC  --   --   --   --   --   --   --   --  7.4  --  7.4  --  8.5   HGB 8.3* 8.4* 8.7* 7.7*   < > 7.8* 8.1*   < > 8.6*  8.6*   < > 8.0*  8.0*   < > 5.3*   MCV  --   --   --   --   --   --   --   --  96  --  97  --  97   PLT  --   --   --   --   --   --   --   --  188  --  186  --  225   INR  --   --   --   --   --   --   --   --   --   --   --   --  1.65*   *  --   --  146*  --  148* 148*  --  147*  --  144   < > 142   POTASSIUM 3.7  --   --  3.4  --  3.8 4.2  --  4.5  --  4.0   < > 3.9   CHLORIDE 119*  --   --  116*  --  119* 118*  --  119*  --  118*   < > 112*   CO2 25  --   --  26  --  24 23  --  20  --  23   < > 24   BUN 17  --   --  17  --  28 33*  --  33*  --  31*   < > 50*   CR 1.11  --   --  1.08  --  1.43* 1.60*  --  1.79*  --  1.91*   < > 2.35*   ANIONGAP 5  --   --  4  --  5 7  --  8  --  3   < > 6   AMRITA 8.5  --   --  8.4*  --  8.0* 8.4*  --  8.6  --  8.6   < > 8.5   *  --   --  112*  --  124* 132*   < > 73  --  94   < > 95   ALBUMIN  --   --   --   --   --   --  2.3*  --   --   --   --   --  2.6*   PROTTOTAL  --   --   --   --   --   --  5.6*  --   --   --   --   --  6.1*   BILITOTAL  --   --   --   --   --   --  0.6  --   --   --   --   --  0.2   ALKPHOS  --   --    --   --   --   --  97  --   --   --   --   --  112   ALT  --   --   --   --   --   --  12  --   --   --   --   --  22   AST  --   --   --   --   --   --  15  --   --   --   --   --  24    < > = values in this interval not displayed.       No results found for this or any previous visit (from the past 24 hour(s)).      Assessment & Plan   Hermilo Velasquez is a 89 year old male with hx of known colonic AVMs, chronic a-fib and recurrent PE on chronic AC with apixaban, aortic stenosis s/p TAVR, COPD, and cognitive impairment who was admitted on 8/23/2022 for acute anemia due to suspected GI bleed     Acute blood loss on chronic anemia due to suspected GI bleed  Hx colonic AVMs  * Referred to ED after routine blood work revealed a Hgb 5.8  * No obvious history of GI bleed however patient has history of AVMs in his colon which was treated with APC on 4/12/2022; also on on chronic AC with apixaban  * Transfused 1u pRBCs in the ED  * [facility] GI consulted on admission  - Received additional 1u pRBCs earlier this morning, 8/24  - Holding PTA apixaban  - Not on PPI due to allergy  - Continues on PTA famotidine 40 mg BID  - [facility] GI following,  EGD completed : no endoscopic abnormalities noted, colonoscopy deferred due to agitated delirium and hgb stability    Hospital-acquired pneumonia with possible aspiration  -Patient had a worsening mental status yesterday.  ABG did show some acidosis.  Chest x-ray showed bilateral new infiltrates concerning for pneumonia with right greater than left.  Could have a component of aspiration.  Was started on IV cefepime and azithromycin.  Improved today.  Speech eval showed esophageal dysphagia.  Recommended one-to-one supervision with minced and moist food with mildly thick liquids.    Stopped IV fluids yesterday and patient continues to have worsening hypernatremia today due to inadequate oral intake.  Will discuss with wife regarding goals of care tomorrow.  Consulted palliative care.   Consulted Occupational Therapy for cognitive evaluation.    Cognitive impairment  Delirium  * Patient lives with his wife who is his primary caregiver. Also has home care.  - patient had prior delirium episodes when he had surgery  - PT/OT consulted.  Recommended TCU placement due to ongoing weakness.  - sitter + restraints removed since his delirium resolved at this time.  - increase seroquel to 50 mg at bedtime and 25 mg every 6 hours for agitation.    - UA negative  -Delirium and cognitive impairment has improved but I think he has some baseline cognitive dysfunction.  Restart D5 half NS at 50 cc an hour today due to persistent hyponatremia.    FRED on CKD stage IIIa, Improved  * Creatinine up to 2.35 from 0.87 on 7/30/22, although creatinine has previously fluctuated in 1.6-1.9 range  * Suspected pre-renal state due to blood loss, borderline hypotension (noted on arrival), home torsemide, and recent use of Bactrim for UTI  * Improved with IV fluids and blood transfusion  -Acute renal failure resolved.  Creatinine back to normal at 1.08.  IV fluids discontinued.  -Hold torsemide for now due to persistent hyponatremia.    Hypernatremia-likely secondary to reduced oral intake.  Sodium continues to increase every time IV fluids are discontinued due to inadequate oral intake especially with thickened liquids.  Will discuss with wife regarding goals of care tomorrow and possible need of tube feeds versus ongoing speech therapy with regular liquids.      Chronic atrial fibrillation  Hx of pulmonary embolism  - Continues on PTA metoprolol 12.5 mg BID  - Holding PTA apixaban in setting of suspected GI bleed  - Initiated discussion regarding risks/benefits of anticoagulation with patient's wife. Conversation with patient limited due to apparent cognitive impairment. Patients wife agrees on holding AC for the time being, but would like to resume conversation re: long term AC with patient's cardiologist, who he has an  appointment with on Fri.  Wife called and unfortunately canceled this appointment.  He will not be getting another appointment for a while.  She might have to have conversation regarding anticoagulation with patient's primary care.     Hx aortic stenosis s/p TAVR  * TTE (8/22): EF 50-55%, unable to evaluate WMA due to limited study; mean AV gradient 14 mmHg  - Appears compensated without signs of volume overload  - Low threshold to d/c IV fluids for resp distress     Peripheral neuropathy  - PTA gabapentin has been decreased from 600 to 300 mg qhs in setting of FRED     Otherwise chronic and stable medical conditions  Hx of right TKA on 7/25/2022  COPD  Dyslipidemia  Hx of lung cancer s/p wedge resection (2019)  Hx of non-Hodgkin's Lymphoma, in remission  MGUS  BPH     Diet:   Moist diet with thickened liquids  DVT Prophylaxis: Pneumatic Compression Devices  Suazo Catheter: Not present  Code Status: Full Code         Disposition:   Cussed patient care with his wife.  She will be coming in at around 1 PM tomorrow.  Will have family discussion regarding goals of care tomorrow with wife and patient with palliative care as well.      Loni Yuen MD, MD  [phone](p)  [phone]( c)

## 2022-08-30 NOTE — PLAN OF CARE
Up to chair most of shift. Poor PO intake, refusing to eat. Tylenol given for R Knee pain. Await safe dispo. Palliative consult for [facility]

## 2022-08-30 NOTE — PLAN OF CARE
[id number]    AO to self, VSS on RA. A1 GB/W.  Patient denies pain. More alert this shift and conversing. Patient is up with Ax2 gb/wk. Needs to sit up right and in chair for meals for risk of aspiration. Takes pills with applesauce.  Poor appetite.  Patient is incontinent of B&B.  No external catheter because patient continues to remove it.

## 2022-08-31 ENCOUNTER — APPOINTMENT (OUTPATIENT)
Dept: SPEECH THERAPY | Facility: CLINIC | Age: 87
DRG: 377 | End: 2022-08-31
Payer: COMMERCIAL

## 2022-08-31 ENCOUNTER — APPOINTMENT (OUTPATIENT)
Dept: GENERAL RADIOLOGY | Facility: CLINIC | Age: 87
DRG: 377 | End: 2022-08-31
Attending: HOSPITALIST
Payer: COMMERCIAL

## 2022-08-31 ENCOUNTER — APPOINTMENT (OUTPATIENT)
Dept: OCCUPATIONAL THERAPY | Facility: CLINIC | Age: 87
DRG: 377 | End: 2022-08-31
Attending: HOSPITALIST
Payer: COMMERCIAL

## 2022-08-31 LAB
ALBUMIN SERPL-MCNC: 2.1 G/DL (ref 3.4–5)
ALP SERPL-CCNC: 80 U/L (ref 40–150)
ALT SERPL W P-5'-P-CCNC: 11 U/L (ref 0–70)
ANION GAP SERPL CALCULATED.3IONS-SCNC: 2 MMOL/L (ref 3–14)
ANION GAP SERPL CALCULATED.3IONS-SCNC: 3 MMOL/L (ref 3–14)
AST SERPL W P-5'-P-CCNC: 10 U/L (ref 0–45)
BILIRUB SERPL-MCNC: 0.4 MG/DL (ref 0.2–1.3)
BUN SERPL-MCNC: 18 MG/DL (ref 7–30)
BUN SERPL-MCNC: 18 MG/DL (ref 7–30)
CALCIUM SERPL-MCNC: 9 MG/DL (ref 8.5–10.1)
CALCIUM SERPL-MCNC: 9 MG/DL (ref 8.5–10.1)
CHLORIDE BLD-SCNC: 117 MMOL/L (ref 94–109)
CHLORIDE BLD-SCNC: 118 MMOL/L (ref 94–109)
CO2 SERPL-SCNC: 26 MMOL/L (ref 20–32)
CO2 SERPL-SCNC: 26 MMOL/L (ref 20–32)
CREAT SERPL-MCNC: 0.92 MG/DL (ref 0.66–1.25)
CREAT SERPL-MCNC: 0.92 MG/DL (ref 0.66–1.25)
ERYTHROCYTE [DISTWIDTH] IN BLOOD BY AUTOMATED COUNT: 16.2 % (ref 10–15)
GFR SERPL CREATININE-BSD FRML MDRD: 80 ML/MIN/1.73M2
GFR SERPL CREATININE-BSD FRML MDRD: 80 ML/MIN/1.73M2
GLUCOSE BLD-MCNC: 106 MG/DL (ref 70–99)
GLUCOSE BLD-MCNC: 106 MG/DL (ref 70–99)
HCT VFR BLD AUTO: 28.5 % (ref 40–53)
HGB BLD-MCNC: 8.5 G/DL (ref 13.3–17.7)
HGB BLD-MCNC: 8.5 G/DL (ref 13.3–17.7)
MCH RBC QN AUTO: 29.1 PG (ref 26.5–33)
MCHC RBC AUTO-ENTMCNC: 29.8 G/DL (ref 31.5–36.5)
MCV RBC AUTO: 98 FL (ref 78–100)
PLATELET # BLD AUTO: 129 10E3/UL (ref 150–450)
POTASSIUM BLD-SCNC: 3.7 MMOL/L (ref 3.4–5.3)
POTASSIUM BLD-SCNC: 3.8 MMOL/L (ref 3.4–5.3)
PROT SERPL-MCNC: 5.4 G/DL (ref 6.8–8.8)
RBC # BLD AUTO: 2.92 10E6/UL (ref 4.4–5.9)
SODIUM SERPL-SCNC: 145 MMOL/L (ref 133–144)
SODIUM SERPL-SCNC: 147 MMOL/L (ref 133–144)
WBC # BLD AUTO: 4.9 10E3/UL (ref 4–11)

## 2022-08-31 PROCEDURE — 80053 COMPREHEN METABOLIC PANEL: CPT | Performed by: INTERNAL MEDICINE

## 2022-08-31 PROCEDURE — 250N000013 HC RX MED GY IP 250 OP 250 PS 637: Performed by: INTERNAL MEDICINE

## 2022-08-31 PROCEDURE — 85018 HEMOGLOBIN: CPT | Performed by: HOSPITALIST

## 2022-08-31 PROCEDURE — 36415 COLL VENOUS BLD VENIPUNCTURE: CPT | Performed by: INTERNAL MEDICINE

## 2022-08-31 PROCEDURE — 99223 1ST HOSP IP/OBS HIGH 75: CPT | Performed by: REGISTERED NURSE

## 2022-08-31 PROCEDURE — 85027 COMPLETE CBC AUTOMATED: CPT | Performed by: HOSPITALIST

## 2022-08-31 PROCEDURE — 120N000001 HC R&B MED SURG/OB

## 2022-08-31 PROCEDURE — 99233 SBSQ HOSP IP/OBS HIGH 50: CPT | Performed by: HOSPITALIST

## 2022-08-31 PROCEDURE — 250N000011 HC RX IP 250 OP 636: Performed by: INTERNAL MEDICINE

## 2022-08-31 PROCEDURE — 74230 X-RAY XM SWLNG FUNCJ C+: CPT

## 2022-08-31 PROCEDURE — 92611 MOTION FLUOROSCOPY/SWALLOW: CPT | Mod: GN

## 2022-08-31 PROCEDURE — 250N000013 HC RX MED GY IP 250 OP 250 PS 637: Performed by: REGISTERED NURSE

## 2022-08-31 PROCEDURE — 250N000011 HC RX IP 250 OP 636: Performed by: HOSPITALIST

## 2022-08-31 PROCEDURE — 97535 SELF CARE MNGMENT TRAINING: CPT | Mod: GO | Performed by: OCCUPATIONAL THERAPIST

## 2022-08-31 PROCEDURE — 250N000013 HC RX MED GY IP 250 OP 250 PS 637: Performed by: HOSPITALIST

## 2022-08-31 RX ORDER — ACETAMINOPHEN 325 MG/1
650 TABLET ORAL 3 TIMES DAILY
Status: DISCONTINUED | OUTPATIENT
Start: 2022-08-31 | End: 2022-09-08 | Stop reason: HOSPADM

## 2022-08-31 RX ORDER — GABAPENTIN 100 MG/1
100 CAPSULE ORAL AT BEDTIME
Status: DISCONTINUED | OUTPATIENT
Start: 2022-08-31 | End: 2022-09-07

## 2022-08-31 RX ORDER — QUETIAPINE FUMARATE 25 MG/1
25 TABLET, FILM COATED ORAL AT BEDTIME
Status: DISCONTINUED | OUTPATIENT
Start: 2022-08-31 | End: 2022-09-01

## 2022-08-31 RX ADMIN — FLUTICASONE FUROATE AND VILANTEROL TRIFENATATE 1 PUFF: 200; 25 POWDER RESPIRATORY (INHALATION) at 10:37

## 2022-08-31 RX ADMIN — ATORVASTATIN CALCIUM 10 MG: 10 TABLET, FILM COATED ORAL at 10:36

## 2022-08-31 RX ADMIN — QUETIAPINE FUMARATE 25 MG: 25 TABLET ORAL at 21:30

## 2022-08-31 RX ADMIN — POLYETHYLENE GLYCOL 3350 17 G: 17 POWDER, FOR SOLUTION ORAL at 10:36

## 2022-08-31 RX ADMIN — FAMOTIDINE 20 MG: 20 TABLET ORAL at 10:34

## 2022-08-31 RX ADMIN — ACETAMINOPHEN 650 MG: 325 TABLET ORAL at 16:01

## 2022-08-31 RX ADMIN — UMECLIDINIUM 1 PUFF: 62.5 AEROSOL, POWDER ORAL at 10:38

## 2022-08-31 RX ADMIN — CEFEPIME HYDROCHLORIDE 2 G: 2 INJECTION, POWDER, FOR SOLUTION INTRAVENOUS at 13:11

## 2022-08-31 RX ADMIN — ACETAMINOPHEN 650 MG: 325 TABLET ORAL at 21:30

## 2022-08-31 RX ADMIN — ALFUZOSIN HYDROCHLORIDE 10 MG: 10 TABLET, EXTENDED RELEASE ORAL at 10:35

## 2022-08-31 RX ADMIN — ACETAMINOPHEN 650 MG: 325 TABLET ORAL at 21:32

## 2022-08-31 RX ADMIN — QUETIAPINE FUMARATE 25 MG: 25 TABLET ORAL at 23:30

## 2022-08-31 RX ADMIN — DOXYCYCLINE 100 MG: 100 INJECTION, POWDER, LYOPHILIZED, FOR SOLUTION INTRAVENOUS at 04:56

## 2022-08-31 RX ADMIN — GABAPENTIN 100 MG: 100 CAPSULE ORAL at 21:31

## 2022-08-31 RX ADMIN — DOXYCYCLINE 100 MG: 100 INJECTION, POWDER, LYOPHILIZED, FOR SOLUTION INTRAVENOUS at 17:46

## 2022-08-31 ASSESSMENT — ACTIVITIES OF DAILY LIVING (ADL)
ADLS_ACUITY_SCORE: 36

## 2022-08-31 NOTE — PROGRESS NOTES
"Video Swallow Evaluation   08/31/22 1010   General Information   Onset of Illness/Injury or Date of Surgery 08/23/22   Referring Physician Loni Yuen MD   Patient/Family Therapy Goal Statement (SLP) None stated, lethargic   Pertinent History of Current Problem Per provider's documentation \"Hermilo Velasquez is a 89 year old male with hx of known colonic AVMs, chronic a-fib and recurrent PE on chronic AC with apixaban, aortic stenosis s/p TAVR, COPD, and cognitive impairment who was admitted on 8/23/2022 for acute anemia due to suspected GI bleed\" New infiltrates on chest x-ray   General Observations Pt is lethargic when brought down to radiology. He opens his eyes briefly and agrees to video swallow.  However, once swallow study was initiated pt lacked participation and shook his head \"no\" to participating.   Type of Evaluation   Type of Evaluation Swallow Evaluation   General Swallowing Observations   Swallowing Evaluation Videofluoroscopic swallow study (VFSS)   VFSS Evaluation   Radiologist Dr. Velazquez   Views Taken left lateral   VFSS Textures Trialed mildly thick liquids   VFSS Eval: Mildly Thick Liquids   Mode of Presentation spoon;fed by clinician   Order of Presentation 1   Preparatory Phase Holding   Oral Phase Delayed AP movement;Residue in oral cavity;Premature pharyngeal entry  (Holding)   Pharyngeal Phase Delayed swallow reflex   Rosenbek's Penetration Aspiration Scale 1 - no aspiration, contrast does not enter airway   Diagnostic Statement Single trial of mildly thick by teaspoon. Bolus holding, prolonged propulsion despite max verbal and tactile cues. No penetration or aspiration on the single trial.   Swallowing Recommendations   Diet Consistency Recommendations minced & moist (level 5);mildly thick liquids (level 2)   Supervision Level for Intake 1:1 supervision needed   Mode of Delivery Recommendations bolus size, small;food moistened;slow rate of intake   Swallowing Maneuver " Recommendations alternate food and liquid intake;extra swallow   Monitoring/Assistance Required (Eating/Swallowing) check mouth frequently for oral residue/pocketing;cue for finger/lingual sweep if oral pocketing present;stop eating activities when fatigue is present;monitor for cough or change in vocal quality with intake   Recommended Feeding/Eating Techniques (Swallow Eval) maintain upright sitting position for eating;maintain upright posture during/after eating for 30 minutes;minimize distractions during oral intake;provide assist with feeding;provide oral hygiene prior to intake   Medication Administration Recommendations, Swallowing (SLP) In puree or with mildly thick   General Therapy Interventions   Planned Therapy Interventions Dysphagia Treatment   Dysphagia treatment Modified diet education;Instruction of safe swallow strategies   Clinical Impression   Criteria for Skilled Therapeutic Interventions Met (SLP Eval) Yes, treatment indicated   SLP Diagnosis Oropharyngeal dysphagia   Risks & Benefits of therapy have been explained evaluation/treatment results reviewed;care plan/treatment goals reviewed;patient   Clinical Impression Comments Pt seen for very limited video swallow evaluation. Pt is lethargic and had limited participation despite max verbal and tactile cues. Pt consumed one teaspoon of mildly thick liquids. Bolus holding, extremely prolonged oral phase with limited effort d/t pt's lethargy. Eventually pt did swallow without penetration or aspiration. Swallow was delayed, adequate epiglottic inversion was achieved and adequate pharyngeal clearance. Limited video swallow evaluation. Continue current diet - minced and moist (5) and mildly thick liquids (2) only when alert and upright with 1:1 assistance. SLP will continue to follow at bedside.   SLP Discharge Planning   SLP Discharge Recommendation Transitional Care Facility   SLP Rationale for DC Rec Swallow function is below baseline   SLP Brief  overview of current status  Limited video swallow evaluation. Continue current diet - minced and moist (5) and mildly thick liquids (2) only when alert and upright with 1:1 assistance. SLP will continue to follow at bedside.    Total Evaluation Time   Total Evaluation Time (Minutes) 18

## 2022-08-31 NOTE — PLAN OF CARE
Goal Outcome Evaluation: A&O x4, however this waxes and wanes.  VSS on 2l NC.  C/O pain in left knee, managed with prn Tylenol, with relief. Pt appeared more alert and oriented than in the past. Slept well during the night. . Patient is up with Ax2 gb/wk. On thickened liquids, however does not like this. Takes pills with applesauce. Patient is incontinent of B&B. IVF infusing. Plans for palliative consult today.    Plan of Care Reviewed With: patient     Overall Patient Progress: improving

## 2022-08-31 NOTE — PLAN OF CARE
Goal Outcome Evaluation:        Patient is alert and oriented X 2. VSS. Patient denies pain and acetaminophen is now scheduled. Patient had a swallow study done which indicated that swallow function is below baseline with diet of minced, moist for food, and mildly thickened liquids for medications and consumption. Metoprolol changed to 25 mg, Quetiapine dose changed to 25 mg. Patient had palliative consult with family and physician today. The plan is to continue current restorative cares. Evaluate for urinary retention and constipation.

## 2022-08-31 NOTE — PROGRESS NOTES
[facility]    Hospitalist Progress Note    Interval History   Patient was very drowsy this morning after undergoing radio swallow study.  She perked up quite a bit this afternoon when his family came and we had a family conference regarding goals of care .     -Data reviewed today: I reviewed all new labs and imaging results over the last 24 hours. I personally reviewed no images or EKG's today.    Physical Exam   Temp: 97.5  F (36.4  C) Temp src: Axillary BP: 127/62 Pulse: 74   Resp: 20 SpO2: 95 % O2 Device: Nasal cannula Oxygen Delivery: 2 LPM  Vitals:    08/29/22 0620 08/30/22 0600 08/31/22 0629   Weight: 71.3 kg (157 lb 3 oz) 70.7 kg (155 lb 13.8 oz) 71 kg (156 lb 8.4 oz)     Vital Signs with Ranges  Temp:  [97.5  F (36.4  C)-98.5  F (36.9  C)] 97.5  F (36.4  C)  Pulse:  [74-80] 74  Resp:  [18-20] 20  BP: ()/(43-62) 127/62  SpO2:  [91 %-95 %] 95 %  I/O last 3 completed shifts:  In: 400 [P.O.:400]  Out: -     Physical Exam  Constitutional:       Appearance: He is ill-appearing.      Comments: More awake and alert today.  Afebrile.  Following commands.   HENT:      Head: Normocephalic.   Eyes:      Pupils: Pupils are equal, round, and reactive to light.   Cardiovascular:      Rate and Rhythm: Normal rate and regular rhythm.      Pulses: Normal pulses.      Heart sounds: Normal heart sounds.   Pulmonary:      Effort: Pulmonary effort is normal. No respiratory distress.      Breath sounds: Normal breath sounds.   Abdominal:      General: Abdomen is flat. Bowel sounds are normal. There is no distension.      Tenderness: There is no abdominal tenderness. There is no guarding.   Musculoskeletal:      Comments: Right knee swollen from recent surgery with right lower extremity edema.  Surgical site appears stable.   Skin:     General: Skin is warm and dry.   Psychiatric:         Mood and Affect: Mood normal.           Medications     dextrose 5% and 0.45% NaCl 50 mL/hr at 08/30/22 2110        acetaminophen  650 mg Oral TID     alfuzosin ER  10 mg Oral Daily     atorvastatin  10 mg Oral Daily     ceFEPIme  2 g Intravenous Q12H     doxycycline  100 mg Intravenous Q12H     famotidine  20 mg Oral Daily     fluticasone-vilanterol  1 puff Inhalation Daily     gabapentin  300 mg Oral At Bedtime     [Held by provider] metoprolol succinate ER  12.5 mg Oral BID     polyethylene glycol  17 g Oral Daily     QUEtiapine  50 mg Oral At Bedtime     sodium chloride (PF)  3 mL Intracatheter Q8H     umeclidinium  1 puff Inhalation Daily       Data   Recent Labs   Lab 08/31/22  0829 08/30/22  0631 08/29/22  2147 08/29/22  1503 08/29/22  0619 08/28/22  0620 08/27/22  2245 08/27/22  0829 08/27/22  0613 08/26/22  1557 08/26/22  0620   WBC 4.9  --   --   --   --   --   --   --  7.4  --  7.4   HGB 8.5*  8.5* 8.3* 8.4*   < > 7.7*   < > 8.1*   < > 8.6*  8.6*   < > 8.0*  8.0*   MCV 98  --   --   --   --   --   --   --  96  --  97   *  --   --   --   --   --   --   --  188  --  186   *  145* 149*  --   --  146*   < > 148*  --  147*  --  144   POTASSIUM 3.8  3.7 3.7  --   --  3.4   < > 4.2  --  4.5  --  4.0   CHLORIDE 118*  117* 119*  --   --  116*   < > 118*  --  119*  --  118*   CO2 26  26 25  --   --  26   < > 23  --  20  --  23   BUN 18  18 17  --   --  17   < > 33*  --  33*  --  31*   CR 0.92  0.92 1.11  --   --  1.08   < > 1.60*  --  1.79*  --  1.91*   ANIONGAP 3  2* 5  --   --  4   < > 7  --  8  --  3   AMRITA 9.0  9.0 8.5  --   --  8.4*   < > 8.4*  --  8.6  --  8.6   *  106* 103*  --   --  112*   < > 132*   < > 73  --  94   ALBUMIN 2.1*  --   --   --   --   --  2.3*  --   --   --   --    PROTTOTAL 5.4*  --   --   --   --   --  5.6*  --   --   --   --    BILITOTAL 0.4  --   --   --   --   --  0.6  --   --   --   --    ALKPHOS 80  --   --   --   --   --  97  --   --   --   --    ALT 11  --   --   --   --   --  12  --   --   --   --    AST 10  --   --   --   --   --  15  --   --   --   --     <  > = values in this interval not displayed.       Recent Results (from the past 24 hour(s))   XR Video Swallow with SLP or OT    Narrative    VIDEO SWALLOWING EVALUATION   8/31/2022 10:05 AM     HISTORY: Dysphagia, assess pharyngeal phase    COMPARISON: None.    FLUOROSCOPY TIME: .9 minutes.  SPOT IMAGES OR CINE RUNS: 1    FINDINGS:    Mildly thick: No penetration or aspiration identified.     The patient was unable to complete the full anticipated exam. Refer to  speech pathology report for additional details.    RAI VILLEGAS MD         SYSTEM ID:  [id number]         Assessment & Plan   Hermilo Velasquez is a 89 year old male with hx of known colonic AVMs, chronic a-fib and recurrent PE on chronic AC with apixaban, aortic stenosis s/p TAVR, COPD, and cognitive impairment who was admitted on 8/23/2022 for acute anemia due to suspected GI bleed     Acute blood loss on chronic anemia due to suspected GI bleed  Hx colonic AVMs  * Referred to ED after routine blood work revealed a Hgb 5.8  * No obvious history of GI bleed however patient has history of AVMs in his colon which was treated with APC on 4/12/2022; also on on chronic AC with apixaban  * Transfused 1u pRBCs in the ED  * [facility] GI consulted on admission  - Received additional 1u pRBCs earlier this morning, 8/24  - Holding PTA apixaban. Would recommend holding for nw till colonoscopy can be completed   - Not on PPI due to allergy  - Continues on PTA famotidine 40 mg BID  - [facility] GI following,  EGD completed : no endoscopic abnormalities noted, colonoscopy deferred due to agitated delirium and hgb stability     Hospital-acquired pneumonia with possible aspiration  -Patient had a worsening mental status yesterday.  ABG did show some acidosis.  Chest x-ray showed bilateral new infiltrates concerning for pneumonia with right greater than left.  Could have a component of aspiration.  Was started on IV cefepime and azithromycin.  Improved today.  Speech eval showed  esophageal dysphagia.  Recommended one-to-one supervision with minced and moist food with mildly thick liquids.    Dysphagia-patient had a videofluoroscopic swallow study.  Unfortunately this was incomplete due to significant lethargy.  Patient did not participate very well.  For now speech would like to continue with minced and moist food with thickened liquids and reevaluate every day.    Had an extensive conversation with patient's wife and daughter regarding goals of care and CODE STATUS.  For now wife is insistent that everything be done and patient be full code.  Per his daughter is more leaning towards DNR/DNI.  I do not think that patient is fully cognitive to understand what is going on.  Occupational Therapy has been consulted for cognitive evaluation.  Did discuss tube feeds if his oral intake does not improve and if he is not able to keep up with fluid intake.  For now would like to hold off on this.  We will leave the patient off of IV fluids and monitor his sodium status and oral intake over the next 24 to 48 hours and if he does not improve then will have to have another discussion regarding tube feeds.  Palliative care has been consulted and are following along.    Cognitive impairment  Delirium  * Patient lives with his wife who is his primary caregiver. Also has home care.  - patient had prior delirium episodes when he had surgery  - PT/OT consulted.  Recommended TCU placement due to ongoing weakness.  - sitter + restraints removed since his delirium resolved at this time.  - increase seroquel to 50 mg at bedtime and 25 mg every 6 hours for agitation.  We will reduce Seroquel to 25 mg at bedtime due to increased sleepiness during the day.  - UA negative  -Delirium and cognitive impairment has improved but I think he has some baseline cognitive dysfunction.     FRED on CKD stage IIIa, Improved  * Creatinine up to 2.35 from 0.87 on 7/30/22, although creatinine has previously fluctuated in 1.6-1.9  range  * Suspected pre-renal state due to blood loss, borderline hypotension (noted on arrival), home torsemide, and recent use of Bactrim for UTI  * Improved with IV fluids and blood transfusion  -Acute renal failure resolved.  Creatinine back to normal at 1.08.  IV fluids discontinued.  -Hold torsemide for now due to persistent hyponatremia.    Hypernatremia-likely secondary to reduced oral intake.  Sodium continues to increase every time IV fluids are discontinued due to inadequate oral intake especially with thickened liquids.  Had extensive discussion with wife.  She does not have realistic expectations about outcomes with him and thinks that we need to be more aggressive with his cares.  For now she wants him full code and would want feeding tube done if he continues to have dysphagia.    For now plan is to stop IV fluids for the next 48 hours and evaluate trend with sodium and oral intake and reevaluate need for tube feeding.    Chronic atrial fibrillation  Hx of pulmonary embolism  - Continues on PTA metoprolol 12.5 mg BID  - Holding PTA apixaban in setting of suspected GI bleed  - Initiated discussion regarding risks/benefits of anticoagulation with patient's wife. Conversation with patient limited due to apparent cognitive impairment. Patients wife agrees on holding AC for the time being, but would like to resume conversation re: long term AC with patient's cardiologist, who he has an appointment with on Fri.  Wife called and unfortunately canceled this appointment.  He will not be getting another appointment for a while.  She might have to have conversation regarding anticoagulation with patient's primary care.     Hx aortic stenosis s/p TAVR  * TTE (8/22): EF 50-55%, unable to evaluate WMA due to limited study; mean AV gradient 14 mmHg  - Appears compensated without signs of volume overload  - Low threshold to d/c IV fluids for resp distress     Peripheral neuropathy  - PTA gabapentin has been decreased  from 600 to 300 mg qhs in setting of FRED.  Will reduce this to 100 mg at night to see if this will improve his daytime sleepiness.     Otherwise chronic and stable medical conditions  Hx of right TKA on 7/25/2022.  Sutures removed.  COPD  Dyslipidemia  Hx of lung cancer s/p wedge resection (2019)  Hx of non-Hodgkin's Lymphoma, in remission  MGUS  BPH     Diet:   Moist diet with thickened liquids  DVT Prophylaxis: Pneumatic Compression Devices  Suazo Catheter: Not present  Code Status: Full Code         Disposition:   Discussed patient care with his wife.  Will need TCU when he is stable for discharge.    Loni Yuen MD, MD  [phone](p)  [phone]( c)

## 2022-08-31 NOTE — CONSULTS
[facility]  Palliative Care Consultation Note    Patient: Hermilo Velasquez  Date of Admission:  8/23/2022    Requesting Clinician / Team: Dr Yuen/Hospitalist  Reason for consult: Goals of care    Recommendations:    Goals of Care (see in depth discussion below): Goals of care discussion with Nomi, daughter Emmy and wife Roberta in room with patient.  Goals are to continue current restorative cares and encourage Nomi to take in nutrition and liquids by mouth with goal of increased intake.  Speech-language therapy has made recommendations for safest consistencies.  We will reevaluate in a few days to see how he is doing and have another goals of care discussion if he is not progressing.    Code status: Full Code; reviewed benefits and burdens of CPR with family but they would like to keep current CODE STATUS for now.  They are considering the information.    Advanced Care Planning:    Patient has a completed Health Care Directive: Yes, and on file.    Delirium- multifactorial, related to infection, medications, disomfort/pain, electrolyte/metabolic imbalances, environment.  -Maintain day/night routines and orientation.  -Avoid medications such as steroids, cannabinoids and anticholinergics.   -Optimize hydration/nutrition, and sleep.  -Utilize sensory aids to maintain orientation such as eye glasses, hearing aids or pocket talkers.  -Calm environment, quiet/reassuring voices, orienting cues, minimized noise/night disruptions, when possible.   -Maximize mobility and prevent/treat pain.    Dysphagia.   -Appreciate SLT expertise and recommendations  -Encourage patient to consume altered diet.    Pain, general body related to inactivity, musculoskeletal.  -Recommend acetaminophen 650 mg scheduled and as needed not to exceed 4 g daily.  -Assist with transfers and repositioning as needed.        Patient case was reviewed with Dr. Yuen, patient's RN    Razia Osman, [facility]  [facility]  Contact information available via [facility] Paging/Directory        Thank you for the opportunity to participate in the care of this patient and family. Our team: will continue to follow.     During regular M-F work hours ([id number]) -- if you are not sure who specifically to contact -- please contact us on [facility] Smart Web.     After regular work hours and on weekends/holidays, you can call our answering service at [phone].     Attestation:  Total time on the floor involved in the patient's care: 95 minutes  Total time spent in counseling/care coordination: >50% discussing family care conference patient condition with daughter, wife, hospitalist and palliative care., assessment of symptoms, reviewing current status, goals of care, CODE STATUS, feeding tubes, reviewed case with MD and RN,     Assessments:  Hermilo Velasquez is a 89 year old male with PMH significant for Aortic stenosis s/p TAVR with bioprosthetic valve 2015, A-fib, DVT, COPD, AAA; h/o lung cancer s/p resection, non-Hodgkin's lymphoma s/p chemotherapy, peripheral neuropathy, GERD,  recent scheduled right total knee arthroplasty 7/25-31/2022 with discharge to TCU.Complications during last hospitalization include encephalopathy requiring patient sitter.   He discharged from TCU on 8/12/22. On 8/23 Hermilo was at clinic having routine labs and found to have a Hgb level of 5.7. He was sent to ED for evaluation.    Today, the patient was seen for:   Goals of care    Prognosis, Goals, & Planning:        Prognosis, Goals, and/or Advance Care Planning were addressed today: Yes  I introduced palliative care services and our multidisciplinary team. Explained that our service offers an extra layer of support for individuals who are experiencing serious, life threatening illnesses, which can include assistance with symptom management, emotional and spiritual support as well as complex medical decision making. Reviewed past medical history and  patient/family understanding of the current medical situation.     Dr Yuen, daughter Emmy, wife Roberta and palliative care present for goals of care discussion regarding Nomi's current health status and challenges.  Dr. Yuen gave a detailed explanation regarding Nomi's medical condition.  Some of the challenges are hypoactive delirium and poor ability to participate in therapies as well as poor oral intake of altered diet/speech-language therapy recommendations due to dysphagia.  There is concern he will not get enough nutrition by mouth if he continues with his current intake.  To continue restorative focused care he would likely need a feeding tube.  The different types of tubes were reviewed and would not be considered a good situation for Nomi as he would likely pull on an NG tube and find it very uncomfortable.  The family would like to give Nomi a few more days working to improve his oral intake of nutrition/liquids.  If there is no improvement we will meet again in a few days to review goals of care.    The benefits and burdens of CPR were discussed with Roberta and Emmy with concerns for poor outcomes for Nomi if he were to survive CPR.  Roberta wants to keep Nomi's full CODE STATUS at this time but the family will consider this information.  We can readdress CODE STATUS in the days ahead after giving them time to consider the information.  Nomi is currently too confused to make complex medical decisions.       Functional Status just prior to hospitalization: 3 (Capable of only limited self-care; needs help with ADLs; in bed/chair >50% of waking hours)          Patient has decision-making capacity today for complex decisions: No      Patient's decision making preferences: unable to assess          I have concerns about the patient/family's health literacy today: Yes       Nomi's wife Roberta may have some short-term memory impairment.  Would recommend having children attend any goals of care  conferences.    Coping, Meaning, & Spirituality:   Mood, coping, and/or meaning in the context of serious illness were addressed today: Yes  Summary/Comments:     Social:     Living situation: Lives at home independently with his wife.  Before his knee surgery he was independent.    Key family / caregivers: Wife Roberta, a daughter Emmy and son are involved    Occupational history: Was a     History of Present Illness:     Adopted from H&P:  Hermilo Velasquez is a 89 year old male with PMH significant for Aortic stenosis s/p TAVR with bioprosthetic valve 2015, A-fib, DVT, COPD, AAA; h/o lung cancer s/p resection, non-Hodgkin's lymphoma s/p chemotherapy, peripheral neuropathy, GERD,  recent scheduled right total knee arthroplasty 7/25-31/2022 with discharge to TCU.Complications during last hospitalization include encephalopathy requiring patient sitter.   He discharged from TCU on 8/12/22. On 8/23 Hermilo was at clinic having routine labs and found to have a Hgb level of 5.7. He was sent to ED for evaluation.  He was given a blood transfusion.  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  Also on chronic anticoagulation apixaban.  An EGD was completed with no abnormalities noted; colonoscopy deferred due agitated delirium and hemoglobin stability.  Chest x-ray showed new bilateral infiltrates concerning for pneumonia and he was started on IV cefepime and azithromycin.  Speech-language therapy performed video swallow study due to concerns for possible aspiration; dysphagia is present and recommendations were made for modified diet thickened liquids.    Key Palliative Symptom Data:  # Pain severity the last 12 hours: none  # Dyspnea severity the last 12 hours: low  # Nausea severity the last 12 hours: none    ROS:  Comprehensive ROS is reviewed and is negative except as here & per HPI: N/A.  Review of systems is limited due to  patient's mental status.       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 Vasuqes      Aortic stenosis     Severe AS, 9/2015 AVR - [facility] TRIFECTA Bovine bioprosthesis 25MM TF-25A     Atrial fibrillation (H)     9/2015 Paroxysmal post op Afib - discharged on Warfarin and a beta blocker     COPD (chronic obstructive pulmonary disease) (H)      Deep vein thrombosis (H)      GERD (gastroesophageal reflux disease)      Heart murmur      Monoclonal gammopathy     plasmacyte prominent causing monoclonal gammopathy     Need for SBE (subacute bacterial endocarditis) prophylaxis      Neuropathy      Other and unspecified hyperlipidemia      Other malignant lymphomas     non hodgkin's lymphoma     RBBB (right bundle branch block)      Severe sepsis with acute organ dysfunction (H) 11/16/2015     Unspecified hereditary and idiopathic peripheral neuropathy         Past Surgical History:  Past Surgical History:   Procedure Laterality Date     APPENDECTOMY       ARTHROPLASTY KNEE Right 7/25/2022    Procedure: RIGHT TOTAL KNEE ARTHROPLASTY;  Surgeon: Giuliano Deshpande MD;  Location:  OR     AS TOTAL KNEE ARTHROPLASTY       BACK SURGERY       ESOPHAGOSCOPY, GASTROSCOPY, DUODENOSCOPY (EGD), COMBINED N/A 11/28/2015    Procedure: COMBINED ESOPHAGOSCOPY, GASTROSCOPY, DUODENOSCOPY (EGD);  Surgeon: Danis Castillo MD;  Location:  GI     ESOPHAGOSCOPY, GASTROSCOPY, DUODENOSCOPY (EGD), COMBINED N/A 7/26/2018    Procedure: COMBINED ESOPHAGOSCOPY, GASTROSCOPY, DUODENOSCOPY (EGD);;  Surgeon: Toby Dong DO;  Location:  GI     ESOPHAGOSCOPY, GASTROSCOPY, DUODENOSCOPY (EGD), COMBINED N/A 8/17/2020    Procedure: ESOPHAGOGASTRODUODENOSCOPY (EGD);  Surgeon: Herrera Henry MD;  Location:  GI     ESOPHAGOSCOPY, GASTROSCOPY, DUODENOSCOPY (EGD), COMBINED N/A 10/24/2020    Procedure: ESOPHAGOGASTRODUODENOSCOPY (EGD);   "Surgeon: Vick Florentino MD;  Location:  GI     ESOPHAGOSCOPY, GASTROSCOPY, DUODENOSCOPY (EGD), COMBINED N/A 4/11/2022    Procedure: ESOPHAGOGASTRODUODENOSCOPY (EGD);  Surgeon: Vick Florentino MD;  Location:  GI     ESOPHAGOSCOPY, GASTROSCOPY, DUODENOSCOPY (EGD), COMBINED N/A 8/26/2022    Procedure: ESOPHAGOGASTRODUODENOSCOPY (EGD);  Surgeon: El Mcgovern MD;  Location:  GI     HERNIA REPAIR  2006     HERNIORRHAPHY VENTRAL  4/17/2013    Procedure: HERNIORRHAPHY VENTRAL;  VENTRAL HERNIA REPAIR WITH MESH;  Surgeon: Patel Guzman MD;  Location:  OR     KNEE SURGERY      arthroscopic right knee surgery      LOBECTOMY LUNG Right 3/26/2019    POSSIBLE LOBECTOMY LUNG [facility] Dr[facility]ques [facility]     REPAIR LIGAMENT ANKLE  2/23/2012    Procedure:REPAIR LIGAMENT ANKLE; LEFT TARSAL TUNNEL RELEASE OF KNOT OF ARIEL RELEASE; Surgeon:SAUL PUENTE; Location: OR     REPLACE VALVE AORTIC N/A 9/3/2015    Procedure: REPLACE VALVE AORTIC;  Surgeon: Antonino Mitchell MD;  Location:  OR     ROTATOR CUFF REPAIR RT/LT      bilateral     SPINE SURGERY      3 spine surgeries     THORACOTOMY, WEDGE RESECTION LUNG, COMBINED Right 3/26/2019    RIGHT THORACOTOMY, WEDGE RESECTION, RIGHT LOWER LOBE LUNG NODULE,;  Surgeon: Jose A Vasques MD;  Location:  OR     TONSILLECTOMY       TURP           Family History:  Family History   Problem Relation Age of Onset     C.A.D. Father      Emphysema Father      Melanoma No family hx of      Skin Cancer No family hx of          Allergies:  Allergies   Allergen Reactions     Omeprazole Itching     Pantoprazole Itching     Prevacid [Lansoprazole] Itching     Lasix [Furosemide] Rash     Lidocaine Blisters and Rash     Allergy to lidocaine ointment  Allergy to lidocaine ointment       Penicillin G Rash     Penicillins Rash     \"broke out from injection\" 60 yrs ago  Tolerates cephalosporins        Medications:  I have reviewed this patient's " medication profile and medications from this hospitalization.     Noted scheduled meds are:    alfuzosin ER  10 mg Oral Daily     atorvastatin  10 mg Oral Daily     ceFEPIme  2 g Intravenous Q12H     doxycycline  100 mg Intravenous Q12H     famotidine  20 mg Oral Daily     fluticasone-vilanterol  1 puff Inhalation Daily     gabapentin  300 mg Oral At Bedtime     [Held by provider] metoprolol succinate ER  12.5 mg Oral BID     polyethylene glycol  17 g Oral Daily     QUEtiapine  50 mg Oral At Bedtime     sodium chloride (PF)  3 mL Intracatheter Q8H     umeclidinium  1 puff Inhalation Daily       Noted PRN meds are:  acetaminophen **OR** acetaminophen, albuterol, benzocaine 20%, benztropine, lidocaine (buffered or not buffered), melatonin, midazolam, ondansetron **OR** ondansetron, QUEtiapine, sodium chloride (PF)    Physical Exam:  Vital Signs: Temp: 97.5  F (36.4  C) Temp src: Axillary BP: 127/62 Pulse: 74   Resp: 20 SpO2: 95 % O2 Device: Nasal cannula Oxygen Delivery: 2 LPM  Weight: 156 lbs 8.43 oz    Physical Exam  GENERAL:  Alert, fatigued, mild distress, delirious,   HEAD: Normocephalic atraumatic  SCLERA: Anicteric  EXTREMITIES: Warm; no edema  ABDOMEN:  Soft, nontender  RESPIRATORY: Breathing non labored.  NEUROLOGIC: Alert, delirious.  PSYCH: restless alternating with lethargy, fluctuating mental status, cooperative.    Data reviewed:  Recent imaging reviewed.  Results for orders placed or performed during the hospital encounter of 08/23/22   CT Head w/o Contrast    Impression    IMPRESSION:    Motion degraded examination.    1.  No definite CT evidence for acute intracranial process.  2.  Brain atrophy and presumed chronic microvascular ischemic changes as above.   US Lower Extremity Venous Duplex Right    Impression    IMPRESSION:  1.  No deep venous thrombosis in the right lower extremity.   XR Chest 1 View    Impression    IMPRESSION: Heart size is normal. Patchy opacities noted in both lungs, right more  so than left, and new/progressed since previous exam. Findings concerning for pneumonia, though asymmetric pulmonary edema or inflammatory process possible. No definite   pleural effusion or pneumothorax. Median sternotomy wires unchanged.        Lab Results   Component Value Date    WBC 4.9 08/31/2022    WBC 7.4 08/27/2022    WBC 7.4 08/26/2022    HGB 8.5 (L) 08/31/2022    HGB 8.5 (L) 08/31/2022    HGB 8.3 (L) 08/30/2022    HCT 28.5 (L) 08/31/2022    HCT 27.9 (L) 08/27/2022    HCT 26.3 (L) 08/26/2022     (L) 08/31/2022     08/27/2022     08/26/2022     (H) 08/31/2022     (H) 08/31/2022     (H) 08/30/2022    POTASSIUM 3.7 08/31/2022    POTASSIUM 3.8 08/31/2022    POTASSIUM 3.7 08/30/2022    CHLORIDE 117 (H) 08/31/2022    CHLORIDE 118 (H) 08/31/2022    CHLORIDE 119 (H) 08/30/2022    CO2 26 08/31/2022    CO2 26 08/31/2022    CO2 25 08/30/2022    BUN 18 08/31/2022    BUN 18 08/31/2022    BUN 17 08/30/2022    CR 0.92 08/31/2022    CR 0.92 08/31/2022    CR 1.11 08/30/2022     (H) 08/31/2022     (H) 08/31/2022     (H) 08/30/2022    SED 89 (H) 05/16/2019    SED 83 (H) 05/15/2019    SED 68 (H) 05/14/2019    DD 4.6 (H) 03/30/2020    NTBNPI 14,152 (H) 07/28/2022    NTBNPI 6,429 (H) 04/14/2021    NTBNPI 1,470 03/30/2020    NTBNP 1,233 (H) 08/28/2020    NTBNP 1,593 (H) 08/11/2020    NTBNP 1,363 (H) 10/17/2014    TROPI 0.513 (HH) 04/14/2021    TROPI 0.529 (HH) 04/14/2021    TROPI 0.331 (HH) 04/13/2021    AST 10 08/31/2022    AST 15 08/27/2022    AST 24 08/23/2022    ALT 11 08/31/2022    ALT 12 08/27/2022    ALT 22 08/23/2022    ALKPHOS 80 08/31/2022    ALKPHOS 97 08/27/2022    ALKPHOS 112 08/23/2022    BILITOTAL 0.4 08/31/2022    BILITOTAL 0.6 08/27/2022    BILITOTAL 0.2 08/23/2022    INR 1.65 (H) 08/23/2022    INR 1.99 (H) 05/08/2022    INR 1.39 (H) 10/23/2020      Lab Results   Component Value Date    ALBUMIN 2.1 08/31/2022    ALBUMIN 2.1 04/14/2021          Recent  Labs   Lab 08/28/22  0930 08/27/22  1502   PH  --  7.28*   PCO2  --  49*   PO2  --  94   HCO3  --  23   O2PER 3 2

## 2022-09-01 ENCOUNTER — APPOINTMENT (OUTPATIENT)
Dept: PHYSICAL THERAPY | Facility: CLINIC | Age: 87
DRG: 377 | End: 2022-09-01
Payer: COMMERCIAL

## 2022-09-01 ENCOUNTER — APPOINTMENT (OUTPATIENT)
Dept: SPEECH THERAPY | Facility: CLINIC | Age: 87
DRG: 377 | End: 2022-09-01
Payer: COMMERCIAL

## 2022-09-01 ENCOUNTER — TELEPHONE (OUTPATIENT)
Dept: CARDIOLOGY | Facility: CLINIC | Age: 87
End: 2022-09-01

## 2022-09-01 ENCOUNTER — TELEPHONE (OUTPATIENT)
Dept: FAMILY MEDICINE | Facility: CLINIC | Age: 87
End: 2022-09-01

## 2022-09-01 LAB
ANION GAP SERPL CALCULATED.3IONS-SCNC: 4 MMOL/L (ref 3–14)
BUN SERPL-MCNC: 21 MG/DL (ref 7–30)
CALCIUM SERPL-MCNC: 9 MG/DL (ref 8.5–10.1)
CHLORIDE BLD-SCNC: 120 MMOL/L (ref 94–109)
CO2 SERPL-SCNC: 25 MMOL/L (ref 20–32)
CREAT SERPL-MCNC: 1.01 MG/DL (ref 0.66–1.25)
GFR SERPL CREATININE-BSD FRML MDRD: 71 ML/MIN/1.73M2
GLUCOSE BLD-MCNC: 97 MG/DL (ref 70–99)
HGB BLD-MCNC: 8.1 G/DL (ref 13.3–17.7)
POTASSIUM BLD-SCNC: 3.6 MMOL/L (ref 3.4–5.3)
SODIUM SERPL-SCNC: 149 MMOL/L (ref 133–144)

## 2022-09-01 PROCEDURE — 92526 ORAL FUNCTION THERAPY: CPT | Mod: GN | Performed by: SPEECH-LANGUAGE PATHOLOGIST

## 2022-09-01 PROCEDURE — 99233 SBSQ HOSP IP/OBS HIGH 50: CPT | Performed by: INTERNAL MEDICINE

## 2022-09-01 PROCEDURE — 999N000157 HC STATISTIC RCP TIME EA 10 MIN

## 2022-09-01 PROCEDURE — 250N000011 HC RX IP 250 OP 636: Performed by: HOSPITALIST

## 2022-09-01 PROCEDURE — 80048 BASIC METABOLIC PNL TOTAL CA: CPT | Performed by: INTERNAL MEDICINE

## 2022-09-01 PROCEDURE — 36415 COLL VENOUS BLD VENIPUNCTURE: CPT | Performed by: INTERNAL MEDICINE

## 2022-09-01 PROCEDURE — 250N000013 HC RX MED GY IP 250 OP 250 PS 637: Performed by: HOSPITALIST

## 2022-09-01 PROCEDURE — 250N000011 HC RX IP 250 OP 636: Performed by: INTERNAL MEDICINE

## 2022-09-01 PROCEDURE — 85018 HEMOGLOBIN: CPT | Performed by: HOSPITALIST

## 2022-09-01 PROCEDURE — 250N000013 HC RX MED GY IP 250 OP 250 PS 637: Performed by: INTERNAL MEDICINE

## 2022-09-01 PROCEDURE — 97530 THERAPEUTIC ACTIVITIES: CPT | Mod: GP

## 2022-09-01 PROCEDURE — 120N000001 HC R&B MED SURG/OB

## 2022-09-01 PROCEDURE — 36600 WITHDRAWAL OF ARTERIAL BLOOD: CPT

## 2022-09-01 PROCEDURE — 250N000013 HC RX MED GY IP 250 OP 250 PS 637: Performed by: REGISTERED NURSE

## 2022-09-01 RX ORDER — OLANZAPINE 5 MG/1
5 TABLET, ORALLY DISINTEGRATING ORAL 2 TIMES DAILY PRN
Status: DISCONTINUED | OUTPATIENT
Start: 2022-09-01 | End: 2022-09-08 | Stop reason: HOSPADM

## 2022-09-01 RX ADMIN — DOXYCYCLINE 100 MG: 100 INJECTION, POWDER, LYOPHILIZED, FOR SOLUTION INTRAVENOUS at 05:11

## 2022-09-01 RX ADMIN — CEFEPIME HYDROCHLORIDE 2 G: 2 INJECTION, POWDER, FOR SOLUTION INTRAVENOUS at 00:36

## 2022-09-01 RX ADMIN — FLUTICASONE FUROATE AND VILANTEROL TRIFENATATE 1 PUFF: 200; 25 POWDER RESPIRATORY (INHALATION) at 10:58

## 2022-09-01 RX ADMIN — CEFEPIME HYDROCHLORIDE 2 G: 2 INJECTION, POWDER, FOR SOLUTION INTRAVENOUS at 11:05

## 2022-09-01 ASSESSMENT — ACTIVITIES OF DAILY LIVING (ADL)
ADLS_ACUITY_SCORE: 36

## 2022-09-01 NOTE — TELEPHONE ENCOUNTER
I left detailed message with patient's spouse stating that we will likely cancel the 9/6 visit with Dr. Grijalva being that patient still is in hospital. I told her that Dr. Grijalva normally does not intervene on his patient hospitalized unless her is rounding. I asked that she call back to discuss further.

## 2022-09-01 NOTE — TELEPHONE ENCOUNTER
Health Call Center    Phone Message    May a detailed message be left on voicemail: yes     Reason for Call: Other: Pt's spouse Roberta called stating that pt is currently admitted at [facility], pt has an appointment scheduled with Dr. Grijalva on 9/6/22, Roberta is wanting to know if Dr. Grijalva can come see pt or if they would need to r/s this appointment. Please review and call Roberta back to discuss. Thank you!     Action Taken: Other: Cardiology    Travel Screening: Not Applicable

## 2022-09-01 NOTE — PLAN OF CARE
Goal Outcome Evaluation: A&O to self.  VSS on 2l NC. Denies pain. PT pulled out IV twice during shift. Agitated when attempting to insert new IV, Prn Seroquel given. On thickened liquids, however does complain about the taste. Incontinent of bowel & Bladder. Discharge pending.        Plan of Care Reviewed With: patient     Overall Patient Progress: no change

## 2022-09-01 NOTE — PROGRESS NOTES
"[facility]    Hospitalist Progress Note    Interval History    The patient was seen and examined; chart reviewed  - remains lethargic, non verbal today (which is a change from yesterday); discussed with wife at bed site; she states that yesterday- he was saying few words, waving   - he is up in the chair, NAD; does seem to have some chest congestion  - wife worried about his Eliquis  - minimal intake, he is on combination diet minced and moist with mildly thick fluids, which he does not like, as per the wife; wife states that \"he is stubborn\" and she thinks that he does not eat because he does not like the food; she also keeps asking \"if he can have a malt milk\"      -Data reviewed today: I reviewed all new labs and imaging results over the last 24 hours. I personally reviewed no images or EKG's today.    Physical Exam   Temp: 97.9  F (36.6  C) Temp src: Axillary BP: 120/66 Pulse: 96   Resp: 18 SpO2: 93 % O2 Device: Nasal cannula Oxygen Delivery: 2 LPM  Vitals:    08/29/22 0620 08/30/22 0600 08/31/22 0629   Weight: 71.3 kg (157 lb 3 oz) 70.7 kg (155 lb 13.8 oz) 71 kg (156 lb 8.4 oz)     Vital Signs with Ranges  Temp:  [97.8  F (36.6  C)-98.3  F (36.8  C)] 97.9  F (36.6  C)  Pulse:  [77-96] 96  Resp:  [18-20] 18  BP: (117-127)/(38-69) 120/66  SpO2:  [93 %-97 %] 93 %  I/O last 3 completed shifts:  In: 2100 [P.O.:2100]  Out: -     Physical exam:  General: awake, alert, up in the chair, weak  Head- normocephalic, atraumatic, PERRL  Neck- supple, no cervical lymphadenopathy, no thyromegaly  Pulmonary- decreased respiratory effort, some coarse breath sound, no wheezing  Cardiovasc: irregularly irregular, no murmurs, no rubs  GI- abdomen- soft, nonT, nonD, BS present  Extremities- no leg swelling  Neuro: lethargic, non verbal, follows minimal commands, no FNDs  Psych- mood seems down    Medications       acetaminophen  650 mg Oral TID     alfuzosin ER  10 mg Oral Daily     atorvastatin  10 mg Oral " Daily     ceFEPIme  2 g Intravenous Q12H     doxycycline  100 mg Intravenous Q12H     famotidine  20 mg Oral Daily     fluticasone-vilanterol  1 puff Inhalation Daily     gabapentin  100 mg Oral At Bedtime     [Held by provider] metoprolol succinate ER  12.5 mg Oral BID     polyethylene glycol  17 g Oral Daily     QUEtiapine  25 mg Oral At Bedtime     sodium chloride (PF)  3 mL Intracatheter Q8H     umeclidinium  1 puff Inhalation Daily       Data   Recent Labs   Lab 09/01/22  0746 08/31/22  0829 08/30/22  0631 08/28/22  0620 08/27/22  2245 08/27/22  0829 08/27/22  0613 08/26/22  1557 08/26/22  0620   WBC  --  4.9  --   --   --   --  7.4  --  7.4   HGB 8.1* 8.5*  8.5* 8.3*   < > 8.1*   < > 8.6*  8.6*   < > 8.0*  8.0*   MCV  --  98  --   --   --   --  96  --  97   PLT  --  129*  --   --   --   --  188  --  186   * 147*  145* 149*   < > 148*  --  147*  --  144   POTASSIUM 3.6 3.8  3.7 3.7   < > 4.2  --  4.5  --  4.0   CHLORIDE 120* 118*  117* 119*   < > 118*  --  119*  --  118*   CO2 25 26  26 25   < > 23  --  20  --  23   BUN 21 18  18 17   < > 33*  --  33*  --  31*   CR 1.01 0.92  0.92 1.11   < > 1.60*  --  1.79*  --  1.91*   ANIONGAP 4 3  2* 5   < > 7  --  8  --  3   AMRITA 9.0 9.0  9.0 8.5   < > 8.4*  --  8.6  --  8.6   GLC 97 106*  106* 103*   < > 132*   < > 73  --  94   ALBUMIN  --  2.1*  --   --  2.3*  --   --   --   --    PROTTOTAL  --  5.4*  --   --  5.6*  --   --   --   --    BILITOTAL  --  0.4  --   --  0.6  --   --   --   --    ALKPHOS  --  80  --   --  97  --   --   --   --    ALT  --  11  --   --  12  --   --   --   --    AST  --  10  --   --  15  --   --   --   --     < > = values in this interval not displayed.       No results found for this or any previous visit (from the past 24 hour(s)).      Assessment & Plan   Hermilo Velasquez is a 89 year old male with hx of known colonic AVMs, chronic a-fib and recurrent PE on chronic AC with apixaban, aortic stenosis s/p TAVR, COPD, and  cognitive impairment who was admitted on 8/23/2022 for acute anemia due to suspected GI bleed     Acute blood loss on chronic anemia due to suspected GI bleed  Hx colonic AVMs  * Referred to ED after routine blood work revealed a Hgb 5.8  * No obvious history of GI bleed however patient has history of AVMs in his colon which was treated with APC on 4/12/2022; also on on chronic AC with apixaban  * Transfused 1u pRBCs in the ED  * [facility] consulted on admission  - Received additional 1u pRBCs on 8/24  - Holding PTA apixaban. Would recommend holding for nw till colonoscopy can be completed   - Not on PPI due to allergy  - Continues on PTA famotidine 40 mg BID  - [facility] following,  EGD completed : no endoscopic abnormalities noted, colonoscopy deferred due to agitated delirium and hgb stability     Hospital-acquired pneumonia with possible aspiration  -Patient had a worsening mental status 8/26;  ABG did show some acidosis.  Chest x-ray showed bilateral new infiltrates concerning for pneumonia with right greater than left.  Could have a component of aspiration.  Was started on IV cefepime and doxycycline.   Speech eval showed esophageal dysphagia.  Recommended one-to-one supervision with minced and moist food with mildly thick liquids.  - received ABX for 6 days, no fever, no leucocytosis, AMST recommended to stop ABX at this time  - will monitor clinically     Dysphagia  -patient had a videofluoroscopic swallow study.  Unfortunately this was incomplete due to significant lethargy.  Patient did not participate very well.  For now speech would like to continue with minced and moist food with thickened liquids and reevaluate every day.    As per prior hospitalist - Had an extensive conversation with patient's wife and daughter regarding goals of care and CODE STATUS.  For now wife is insistent that everything be done and patient be full code.  Per his daughter is more leaning towards DNR/DNI.  I do not think that  patient is fully cognitive to understand what is going on.  Occupational Therapy has been consulted for cognitive evaluation.  Did discuss tube feeds if his oral intake does not improve and if he is not able to keep up with fluid intake.  For now would like to hold off on this.  We will leave the patient off of IV fluids and monitor his sodium status and oral intake over the next 24 to 48 hours and if he does not improve then will have to have another discussion regarding tube feeds.  Palliative care has been consulted and are following along.    - Goals are to continue current restorative cares and encourage Nomi to take in nutrition and liquids by mouth with goal of increased intake      Cognitive impairment  Delirium  * Patient lives with his wife who is his primary caregiver. Also has home care.  - patient had prior delirium episodes when he had surgery  - PT/OT consulted.  Recommended TCU placement due to ongoing weakness.  - sitter + restraints removed since his delirium resolved at this time.  - initially on Seroquel to 50 mg at bedtime and 25 mg every 6 hours for agitation; Seroquel reduced to 25 mg at bedtime  On 8/31 due to increased sleepiness during the day.  - he remains lethargic, nonverbal today, minimal oral intake  - will stop scheduled Seroquel for now, has Seroquel 25 mg po q6 h prn and Zyprexa 5 mg BID prn available   - UA negative  - not getting any narcotics or benzo; PTA Gabapentin 600 mg at bedtime now decreased to 100 mg po qhs  - recheck ABG today   - suspected some baseline cognitive dysfunction.     FRED on CKD stage III, resolved  * Creatinine up to 2.35 from 0.87 on 7/30/22, although creatinine has previously fluctuated in 1.6-1.9 range  * Suspected pre-renal state due to blood loss, borderline hypotension (noted on arrival), home torsemide, and recent use of Bactrim for UTI  * Improved with IV fluids and blood transfusion  -Acute renal failure resolved.  Creatinine back to normal at  1.01.  -  IV fluids discontinued.  - Hold PTA Torsemide for now due to persistent hyponatremia.    Hypernatremia  - ikely secondary to reduced oral intake.  Sodium continues to increase every time IV fluids are discontinued due to inadequate oral intake especially with thickened liquids.  Had extensive discussion with wife.  She does not have realistic expectations about outcomes with him and thinks that we need to be more aggressive with his cares.  For now she wants him full code and would want feeding tube done if he continues to have dysphagia.  - off iv fluids now, Na today 149, recheck BMP in am  - will need further discussion about goal of care, tube feeding if mentation and oral intake will not improve.    Chronic atrial fibrillation  Hx of pulmonary embolism  - Continues on PTA metoprolol 12.5 mg BID  - Holding PTA apixaban in setting of suspected GI bleed  - Initiated discussion regarding risks/benefits of anticoagulation with patient's wife. Conversation with patient limited due to apparent cognitive impairment. Patients wife agrees on holding AC for the time being, but would like to resume conversation re: long term AC with patient's cardiologist, who he has an appointment with on Fri.  Wife called and unfortunately canceled this appointment.  He will not be getting another appointment for a while.  She might have to have conversation regarding anticoagulation with patient's primary care.     Hx aortic stenosis s/p TAVR  * TTE (8/22): EF 50-55%, unable to evaluate WMA due to limited study; mean AV gradient 14 mmHg  - Appears compensated without signs of volume overload  - PTA Torsemide on hold given poor oral inatke     Peripheral neuropathy  - PTA gabapentin has been decreased from 600 to 300 mg qhs in setting of FRED--further decreased to 100 mg at night to see if this will improve his daytime sleepiness.     Otherwise chronic and stable medical conditions  Hx of right TKA on 7/25/2022.  Sutures  removed.  COPD  Dyslipidemia  Hx of lung cancer s/p wedge resection (2019)  Hx of non-Hodgkin's Lymphoma, in remission  MGUS  BPH     Diet:   Moist diet with thickened liquids  DVT Prophylaxis: Pneumatic Compression Devices  Suazo Catheter: Not present  Code Status: Full Code         Disposition:   Discussed patient care with his wife and bedside RN.  Will need TCU when he is stable for discharge.    Time Spent on this Encounter   I spent 35 minutes on the unit/floor managing the care of Hermilo Velasquez. Over 50% of my time was spent on the following:   - Counseling the patient and/or family regarding: diagnostic results, prognosis and risks and benefits of treatment options  - Coordination of care with the: nurse        Eli Membreno MD

## 2022-09-01 NOTE — PROGRESS NOTES
Antimicrobial Stewardship Team Note    Antimicrobial Stewardship Program - A joint venture between [facility] and [facility] [employer] to optimize antibiotic management.     Patient: Hermilo Velasquez  MRN: [id number]  Allergies: Omeprazole, Pantoprazole, Prevacid [lansoprazole], Lasix [furosemide], Lidocaine, Penicillin g, and Penicillins    Brief Summary: Hermilo Velasquez is a 89 year old male admitted on 8/23/22 with low hemoglobin (5.3) and suspected GI bleed. PMH significant for known colonic AVMs, chronic a fib and recurrent PE on chronic AC, aortic stenosis s/p TAVR, COPD, and cognitive impairment.    GI consulted and EGD completed with no endoscopic abnormalities noted, colonoscopy deferred due to agitated delirium and hemoglobin stability. He was started on cefepime and doxycycline on 8/27 given worsening mental status and ABG with some acidosis. Chest x-ray with patchy opacities in both lungs, right more than left, new since previous exam. Speech eval noted esophageal dysphagia.    WBC count has been normal this hospitalization and patient has been afebrile, consistently on 2LPM O2 since admission. MRSA nares PCR negative, and Strep pneumoniae and Legionella urine antigens negative.         Active Anti-infective Medications   (From admission, onward)                 Start     Stop    08/27/22 1700  ceFEPIme  2 g,   Intravenous,   EVERY 24 HOURS        Hospital-Acquired Pneumonia        --    08/27/22 1700  doxycycline  100 mg,   Intravenous,   EVERY 12 HOURS        Hospital-Acquired Pneumonia        --                  Assessment: Possible hospital-acquired vs aspiration pneumonia.  Patient admitted with low hemoglobin, concern for GI bleed although no obvious source found. Hemoglobin has since stabilized. On 8/27, patient was started on cefepime and doxycycline with concern for possible pneumonia given patchy opacities on imaging and worsening mental status, although WBC  count remained normal with no fever and stable O2 requirements. He is currently on day 6 of antibiotics and has completed adequate empiric course with negative cultures and is clinically stable. Recommend stopping cefepime and doxycycline at this time.    Recommendations:  Stop cefepime and doxycycline.    Discussed with ID Staff MD Brneda Garces, PharmD, BCIDP    Vital Signs/Clinical Features:  Vitals  Report        08/30 0700  08/31 0659 08/31 0700  09/01 0659 09/01 0700  09/01 1321   Most Recent      Temp ( F) 97.8 -  98.5    97.5 -  97.9      98.3     98.3 (36.8) 09/01 0922    Pulse 77 -  97    74 -  81      92     92 09/01 0922    Resp   18    18 -  20      18     18 09/01 0922    BP 95/43 -  121/62    81/44 -  127/63      126/69     126/69 09/01 0922    SpO2 (%) 91 -  95    95 -  97      96     96 09/01 0922            Labs  Estimated Creatinine Clearance: 49.8 mL/min (based on SCr of 1.01 mg/dL).  Recent Labs   Lab Test 08/27/22  2245 08/28/22  0620 08/29/22  0619 08/30/22  0631 08/31/22  0829 09/01/22  0746   CR 1.60* 1.43* 1.08 1.11 0.92  0.92 1.01       Recent Labs   Lab Test 08/15/20  1450 08/16/20  0829 08/28/20  1145 09/10/20  1425 09/13/20  1256 09/13/20  1800 10/23/20  1617 10/24/20  0037 04/13/21  2038 04/14/21  0506 04/14/21  0604 07/28/22  0720 07/28/22  1643 08/23/22  1426 08/23/22  1712 08/24/22  0411 08/26/22  0620 08/26/22  1557 08/27/22  0613 08/27/22  1139 08/29/22  0619 08/29/22  1503 08/29/22  2147 08/30/22  0631 08/31/22  0829 09/01/22  0746   WBC 9.2   < > 9.0  --  32.3*   < > 8.0   < > 24.6* 17.6*   < > 15.1* 12.9*  --  8.5  --  7.4  --  7.4  --   --   --   --   --  4.9  --    ANEU 6.1  --  6.1  --  27.5*  --  5.5  --  21.9* 15.2*  --   --   --   --   --   --   --   --   --   --   --   --   --   --   --   --    ALYM 1.2  --  1.1  --  1.0  --  0.9  --  0.8 0.8  --   --   --   --   --   --   --   --   --   --   --   --   --   --   --   --    KODY 1.0  --  0.9  --  3.6*   --  1.0  --  1.7* 1.2  --   --   --   --   --   --   --   --   --   --   --   --   --   --   --   --    AEOS 0.8*  --  0.9*  --  0.3  --  0.7  --  0.0 0.2  --   --   --   --   --   --   --   --   --   --   --   --   --   --   --   --    HGB 7.4*   < > 8.5*   < > 8.4*   < > 6.6*   < > 7.0* 5.8*   < > 9.9* 9.5*   < > 5.3*   < > 8.0*  8.0*   < > 8.6*  8.6*   < > 7.7* 8.7* 8.4* 8.3* 8.5*  8.5* 8.1*   HCT 24.9*   < > 29.1*  --  27.9*   < > 22.5*   < > 25.3* 21.2*   < > 31.6* 30.6*  --  18.0*  --  26.3*  --  27.9*  --   --   --   --   --  28.5*  --    MCV 80   < > 87  --  91   < > 89   < > 81 82   < > 88 89  --  97  --  97  --  96  --   --   --   --   --  98  --       < > 248  --  223   < > 279   < > 290 244   < > 151 150  --  225  --  186  --  188  --   --   --   --   --  129*  --     < > = values in this interval not displayed.       Recent Labs   Lab Test 09/08/21  1459 04/10/22  2034 04/11/22  0744 08/23/22  1712 08/27/22  2245 08/31/22  0829   BILITOTAL 0.6 0.5 0.3 0.2 0.6 0.4   ALKPHOS 102 91 78 112 97 80   ALBUMIN 3.1* 2.6* 2.2* 2.6* 2.3* 2.1*   AST 10 11 17 24 15 10   ALT 12 10 10 22 12 11       Recent Labs   Lab Test 02/21/18  1124 01/10/19  1731 05/13/19  1128 05/14/19  1021 05/15/19  0700 05/16/19  0641 12/22/19  1339 03/24/20  0854 03/25/20  0740 03/26/20  0812 08/15/20  1451 04/13/21 2038 04/13/21  2221 04/14/21  0506 04/10/22  2033 04/10/22  2236 04/11/22  0744 07/26/22  1024 07/28/22  0812 07/29/22  0809 08/27/22  1646   PCAL  --   --   --   --   --   --    < > 54.51* 32.08* 11.41*  --  2.30  --  1.63  --   --  1.01*  --   --   --   --    LACT  --   --    < >  --   --   --    < >  --   --   --    < > 2.6*   < >  --  2.5* 1.1  --  1.8 0.9 0.8 0.5*   CRP 12.0*  --   --  182.0* 184.0* 127.0*  --   --   --   --   --   --   --   --   --   --   --   --   --   --   --    SED 32* 33*  --  68* 83* 89*  --   --   --   --   --   --   --   --   --   --   --   --   --   --   --     < > = values in this  interval not displayed.       Recent Labs   Lab Test 11/24/15  1036   VANCOMYCIN 19.8       Culture Results:  7-Day Micro Results       Procedure Component Value Units Date/Time    Legionella pneumophila antigen urine [[id number]]  (Normal) Collected: 08/27/22 1752    Order Status: Completed Lab Status: Final result Updated: 08/28/22 0049    Specimen: Urine, Midstream      Legionella pneumophila serogroup 1 urinary antigen Negative     Comment: Suggests no recent or current infection. Infection due to Legionella cannot be ruled out, since other serogroups and species may cause disease, antigen may not be present in urine in early infection, and the level of antigen present in the urine may be below detectable limits of the test.       Streptococcus pneumoniae antigen [[id number]]  (Normal) Collected: 08/27/22 1752    Order Status: Completed Lab Status: Final result Updated: 08/28/22 0049    Specimen: Urine, Midstream      Streptococcus pneumoniae antigen Negative     Comment: A negative Streptococcus pneumoniae antigen result does not rule out infection with Streptococcus pneumoniae.       MRSA MSSA PCR, Nasal Swab [[id number]] Collected: 08/27/22 1706    Order Status: Completed Lab Status: Final result Updated: 08/27/22 2115    Specimen: Swab from Nare, Left      MRSA Target DNA Negative     SA Target DNA Negative    Narrative:      The [employer]  Xpert SA Nasal Complete assay performed in the [employer]  Dx System is a qualitative in vitro diagnostic test designed for rapid detection of Staphylococcus aureus (SA) and methicillin-resistant Staphylococcus aureus (MRSA) from nasal swabs in patients at risk for nasal colonization. The test utilizes automated real-time polymerase chain reaction (PCR) to detect MRSA/SA DNA. The Xpert SA Nasal Complete assay is intended to aid in the prevention and control of MRSA/SA infections in healthcare settings. The assay is not intended to diagnose, guide or monitor treatment  for MRSA/SA infections, or provide results of susceptibility to methicillin. A negative result does not preclude MRSA/SA nasal colonization.     Respiratory Aerobic Bacterial Culture with Gram Stain     Order Status: Sent Lab Status: No result     Specimen: Sputum from Bronchus     Gram Stain     Order Status: Sent Lab Status: No result     Specimen: Sputum             Recent Labs   Lab Test 03/22/20  1950 08/16/20  0400 04/10/22  2236 05/08/22  1501 08/23/22  1322   URINEPH 6.0 7.0 5.5 5.5 5.5   NITRITE Negative Negative Negative Negative Negative   LEUKEST Negative Negative Negative Negative Trace*   WBCU <1 <1 13* 4 0-5                         Imaging: XR Chest 1 View    Result Date: 8/27/2022  EXAM: XR CHEST 1 VIEW LOCATION: [facility] DATE/TIME: 8/27/2022 1:35 PM INDICATION: ?pneumonia COMPARISON: 07/27/2022     IMPRESSION: Heart size is normal. Patchy opacities noted in both lungs, right more so than left, and new/progressed since previous exam. Findings concerning for pneumonia, though asymmetric pulmonary edema or inflammatory process possible. No definite pleural effusion or pneumothorax. Median sternotomy wires unchanged.    CT Head w/o Contrast    Result Date: 8/27/2022  EXAM: CT HEAD W/O CONTRAST LOCATION: [facility] DATE/TIME: 8/27/2022 1:43 PM INDICATION: Altered mental status. Confusion. COMPARISON: 06/01/2022. TECHNIQUE: Routine CT Head without IV contrast. Multiplanar reformats. Dose reduction techniques were used. FINDINGS: Patient motion degrades evaluation. INTRACRANIAL CONTENTS: No definite intracranial hemorrhage, extraaxial collection, or mass effect.  No definite CT evidence of acute infarct. Mild presumed chronic small vessel ischemic changes. Mild to moderate generalized volume loss. No hydrocephalus.  Note is made of coarse atherosclerotic calcifications of the intracranial vasculature. VISUALIZED ORBITS/SINUSES/MASTOIDS: Prior bilateral  cataract surgery. Visualized portions of the orbits are otherwise unremarkable. Bilateral maxillary sinus mucosal thickening. No middle ear or mastoid effusion. BONES/SOFT TISSUES: No definite acute abnormality.     IMPRESSION: Motion degraded examination. 1.  No definite CT evidence for acute intracranial process. 2.  Brain atrophy and presumed chronic microvascular ischemic changes as above.    XR Video Swallow with SLP or OT    Result Date: 8/31/2022  VIDEO SWALLOWING EVALUATION   8/31/2022 10:05 AM HISTORY: Dysphagia, assess pharyngeal phase COMPARISON: None. FLUOROSCOPY TIME: .9 minutes. SPOT IMAGES OR CINE RUNS: 1 FINDINGS:  Mildly thick: No penetration or aspiration identified. The patient was unable to complete the full anticipated exam. Refer to speech pathology report for additional details. RAI VILLEGAS MD   SYSTEM ID:  [id number]    US Lower Extremity Venous Duplex Right    Result Date: 8/27/2022  EXAM: US LOWER EXTREMITY VENOUS DUPLEX RIGHT LOCATION: [facility] DATE/TIME: 8/27/2022 2:05 PM INDICATION: 89-year-old patient with right knee surgery and pain, request made for evaluation of DVT COMPARISON: None. TECHNIQUE: Venous Duplex ultrasound of the right lower extremity with and without compression, augmentation and duplex. Color flow and spectral Doppler with waveform analysis performed. FINDINGS: Exam includes the common femoral, femoral, popliteal, and contralateral common femoral veins as well as segmentally visualized deep calf veins and greater saphenous vein. RIGHT: No deep vein thrombosis. No superficial thrombophlebitis. No popliteal cyst.     IMPRESSION: 1.  No deep venous thrombosis in the right lower extremity.

## 2022-09-02 ENCOUNTER — APPOINTMENT (OUTPATIENT)
Dept: SPEECH THERAPY | Facility: CLINIC | Age: 87
DRG: 377 | End: 2022-09-02
Payer: COMMERCIAL

## 2022-09-02 LAB
ANION GAP SERPL CALCULATED.3IONS-SCNC: 2 MMOL/L (ref 3–14)
BUN SERPL-MCNC: 21 MG/DL (ref 7–30)
CALCIUM SERPL-MCNC: 9.3 MG/DL (ref 8.5–10.1)
CHLORIDE BLD-SCNC: 119 MMOL/L (ref 94–109)
CO2 SERPL-SCNC: 28 MMOL/L (ref 20–32)
CREAT SERPL-MCNC: 1 MG/DL (ref 0.66–1.25)
ERYTHROCYTE [DISTWIDTH] IN BLOOD BY AUTOMATED COUNT: 15.9 % (ref 10–15)
GFR SERPL CREATININE-BSD FRML MDRD: 72 ML/MIN/1.73M2
GLUCOSE BLD-MCNC: 130 MG/DL (ref 70–99)
HCT VFR BLD AUTO: 28 % (ref 40–53)
HGB BLD-MCNC: 8.5 G/DL (ref 13.3–17.7)
MCH RBC QN AUTO: 29.2 PG (ref 26.5–33)
MCHC RBC AUTO-ENTMCNC: 30.4 G/DL (ref 31.5–36.5)
MCV RBC AUTO: 96 FL (ref 78–100)
PLATELET # BLD AUTO: 148 10E3/UL (ref 150–450)
POTASSIUM BLD-SCNC: 3.3 MMOL/L (ref 3.4–5.3)
RBC # BLD AUTO: 2.91 10E6/UL (ref 4.4–5.9)
SODIUM SERPL-SCNC: 149 MMOL/L (ref 133–144)
WBC # BLD AUTO: 4.9 10E3/UL (ref 4–11)

## 2022-09-02 PROCEDURE — 120N000001 HC R&B MED SURG/OB

## 2022-09-02 PROCEDURE — 99233 SBSQ HOSP IP/OBS HIGH 50: CPT | Performed by: INTERNAL MEDICINE

## 2022-09-02 PROCEDURE — 250N000013 HC RX MED GY IP 250 OP 250 PS 637: Performed by: REGISTERED NURSE

## 2022-09-02 PROCEDURE — 250N000013 HC RX MED GY IP 250 OP 250 PS 637: Performed by: INTERNAL MEDICINE

## 2022-09-02 PROCEDURE — 36415 COLL VENOUS BLD VENIPUNCTURE: CPT | Performed by: INTERNAL MEDICINE

## 2022-09-02 PROCEDURE — 92526 ORAL FUNCTION THERAPY: CPT | Mod: GN

## 2022-09-02 PROCEDURE — 85027 COMPLETE CBC AUTOMATED: CPT | Performed by: INTERNAL MEDICINE

## 2022-09-02 PROCEDURE — 250N000013 HC RX MED GY IP 250 OP 250 PS 637: Performed by: HOSPITALIST

## 2022-09-02 PROCEDURE — 80048 BASIC METABOLIC PNL TOTAL CA: CPT | Performed by: INTERNAL MEDICINE

## 2022-09-02 RX ADMIN — ACETAMINOPHEN 650 MG: 325 TABLET ORAL at 08:41

## 2022-09-02 RX ADMIN — GABAPENTIN 100 MG: 100 CAPSULE ORAL at 22:23

## 2022-09-02 RX ADMIN — ACETAMINOPHEN 650 MG: 325 TABLET ORAL at 16:29

## 2022-09-02 RX ADMIN — ACETAMINOPHEN 650 MG: 325 TABLET ORAL at 22:23

## 2022-09-02 RX ADMIN — FAMOTIDINE 20 MG: 20 TABLET ORAL at 08:42

## 2022-09-02 RX ADMIN — ATORVASTATIN CALCIUM 10 MG: 10 TABLET, FILM COATED ORAL at 08:41

## 2022-09-02 ASSESSMENT — ACTIVITIES OF DAILY LIVING (ADL)
ADLS_ACUITY_SCORE: 40
ADLS_ACUITY_SCORE: 36
ADLS_ACUITY_SCORE: 40
ADLS_ACUITY_SCORE: 40
ADLS_ACUITY_SCORE: 36
ADLS_ACUITY_SCORE: 40
ADLS_ACUITY_SCORE: 40
ADLS_ACUITY_SCORE: 36
ADLS_ACUITY_SCORE: 36
ADLS_ACUITY_SCORE: 40

## 2022-09-02 NOTE — PROGRESS NOTES
"[facility]    Hospitalist Progress Note    Interval History    More awake today, up in the chair, said \"Hi\" but nothing else  - looks comfortable, smiling  - some congested cough noted  - remains on combination diet minced and moist with mildly thick fluids, which he does not like  - discussed with wife at bedside; tried to explained to her about his current medical condition but she seems to not have a good understanding of his current issues  - she wants him to eat \"so will not get weak\"    -Data reviewed today: I reviewed all new labs and imaging results over the last 24 hours. I personally reviewed no images or EKG's today.    Physical Exam   Temp: 99.1  F (37.3  C) Temp src: Axillary BP: 123/70 Pulse: 90   Resp: 20 SpO2: 92 % O2 Device: None (Room air) Oxygen Delivery: 2 LPM  Vitals:    08/30/22 0600 08/31/22 0629 09/02/22 0645   Weight: 70.7 kg (155 lb 13.8 oz) 71 kg (156 lb 8.4 oz) 70.6 kg (155 lb 10.3 oz)     Vital Signs with Ranges  Temp:  [97.9  F (36.6  C)-99.1  F (37.3  C)] 99.1  F (37.3  C)  Pulse:  [90-96] 90  Resp:  [18-20] 20  BP: (120-143)/(66-87) 123/70  SpO2:  [92 %-96 %] 92 %  No intake/output data recorded.    Physical exam:  General: awake, alert, up in the chair, weak, smiling intermittently   Head- normocephalic, atraumatic, PERRL  Neck- supple, no cervical lymphadenopathy, no thyromegaly  Pulmonary- decreased respiratory effort, some coarse breath sound, no wheezing  Cardiovasc: irregularly irregular, no murmurs, no rubs  GI- abdomen- soft, nonT, nonD, BS present  Extremities- no leg swelling  Neuro: minimally verbal (said \"Hi\"), follows minimal commands, no FNDs  Psych- mood seems better today    Medications       acetaminophen  650 mg Oral TID     alfuzosin ER  10 mg Oral Daily     atorvastatin  10 mg Oral Daily     famotidine  20 mg Oral Daily     fluticasone-vilanterol  1 puff Inhalation Daily     gabapentin  100 mg Oral At Bedtime     [Held by provider] " metoprolol succinate ER  12.5 mg Oral BID     polyethylene glycol  17 g Oral Daily     sodium chloride (PF)  3 mL Intracatheter Q8H     umeclidinium  1 puff Inhalation Daily       Data   Recent Labs   Lab 09/01/22  0746 08/31/22  0829 08/30/22  0631 08/28/22  0620 08/27/22  2245 08/27/22  0829 08/27/22  0613   WBC  --  4.9  --   --   --   --  7.4   HGB 8.1* 8.5*  8.5* 8.3*   < > 8.1*   < > 8.6*  8.6*   MCV  --  98  --   --   --   --  96   PLT  --  129*  --   --   --   --  188   * 147*  145* 149*   < > 148*  --  147*   POTASSIUM 3.6 3.8  3.7 3.7   < > 4.2  --  4.5   CHLORIDE 120* 118*  117* 119*   < > 118*  --  119*   CO2 25 26  26 25   < > 23  --  20   BUN 21 18  18 17   < > 33*  --  33*   CR 1.01 0.92  0.92 1.11   < > 1.60*  --  1.79*   ANIONGAP 4 3  2* 5   < > 7  --  8   AMRITA 9.0 9.0  9.0 8.5   < > 8.4*  --  8.6   GLC 97 106*  106* 103*   < > 132*   < > 73   ALBUMIN  --  2.1*  --   --  2.3*  --   --    PROTTOTAL  --  5.4*  --   --  5.6*  --   --    BILITOTAL  --  0.4  --   --  0.6  --   --    ALKPHOS  --  80  --   --  97  --   --    ALT  --  11  --   --  12  --   --    AST  --  10  --   --  15  --   --     < > = values in this interval not displayed.       No results found for this or any previous visit (from the past 24 hour(s)).      Assessment & Plan   Hermilo Velasquez is a 89 year old male with hx of known colonic AVMs, chronic a-fib and recurrent PE on chronic AC with apixaban, aortic stenosis s/p TAVR, COPD, and cognitive impairment who was admitted on 8/23/2022 for acute anemia due to suspected GI bleed     Acute blood loss on chronic anemia due to suspected GI bleed  Hx colonic AVMs  * Referred to ED after routine blood work revealed a Hgb 5.8  * No obvious history of GI bleed however patient has history of AVMs in his colon which was treated with APC on 4/12/2022; also on on chronic AC with apixaban  * Transfused 1u pRBCs in the ED  * [facility] consulted on admission  - Received  additional 1u pRBCs on 8/24  - Holding PTA apixaban. Would recommend holding for nw till colonoscopy can be completed   - Not on PPI due to allergy  - Continues on PTA famotidine 40 mg BID  - [facility] following,  EGD completed : no endoscopic abnormalities noted, colonoscopy deferred due to agitated delirium and hgb stability     Hospital-acquired pneumonia with possible aspiration  -Patient had a worsening mental status 8/26;  ABG did show some acidosis.  Chest x-ray showed bilateral new infiltrates concerning for pneumonia with right greater than left.  Could have a component of aspiration.  Was started on IV cefepime and doxycycline.   Speech eval showed esophageal dysphagia.  Recommended one-to-one supervision with minced and moist food with mildly thick liquids.  - received ABX for 6 days, no fever, no leucocytosis, AMST recommended to stop ABX at this time  - will monitor clinically     Dysphagia  -patient had a videofluoroscopic swallow study.  Unfortunately this was incomplete due to significant lethargy.  Patient did not participate very well.  For now speech would like to continue with minced and moist food with thickened liquids and reevaluate every day.    As per prior hospitalist - Had an extensive conversation with patient's wife and daughter regarding goals of care and CODE STATUS.  For now wife is insistent that everything be done and patient be full code.  Per his daughter is more leaning towards DNR/DNI.  I do not think that patient is fully cognitive to understand what is going on.  Occupational Therapy has been consulted for cognitive evaluation.  Did discuss tube feeds if his oral intake does not improve and if he is not able to keep up with fluid intake.  For now would like to hold off on this.  We will leave the patient off of IV fluids and monitor his sodium status and oral intake over the next 24 to 48 hours and if he does not improve then will have to have another discussion regarding tube  feeds.  Palliative care has been consulted and are following along.    - Goals are to continue current restorative cares and encourage Nomi to take in nutrition and liquids by mouth with goal of increased intake      Cognitive impairment  Delirium  * Patient lives with his wife who is his primary caregiver. Also has home care.  - patient had prior delirium episodes when he had surgery  - PT/OT consulted.  Recommended TCU placement due to ongoing weakness.  - sitter + restraints removed since his delirium resolved at this time.  - initially on Seroquel to 50 mg at bedtime and 25 mg every 6 hours for agitation; Seroquel reduced to 25 mg at bedtime  On 8/31 due to increased sleepiness during the day.  - 9/2- he remained lethargic, nonverbal today, minimal oral intake  - stopped scheduled Seroquel for now, has Seroquel 25 mg po q6 h prn and Zyprexa 5 mg BID prn available   - UA negative  - not getting any narcotics or benzo; PTA Gabapentin 600 mg at bedtime now decreased to 100 mg po qhs  - ordered ABG on 9/2 but not able to obtain due to the patient being restless   - suspected some baseline cognitive dysfunction.   - 9/3- he seems more awake and alert  - monitor    FRED on CKD stage III, resolved  * Creatinine up to 2.35 from 0.87 on 7/30/22, although creatinine has previously fluctuated in 1.6-1.9 range  * Suspected pre-renal state due to blood loss, borderline hypotension (noted on arrival), home torsemide, and recent use of Bactrim for UTI  * Improved with IV fluids and blood transfusion  -Acute renal failure resolved.  Creatinine back to normal at 1.01.  -  IV fluids discontinued.  - Hold PTA Torsemide for now due to persistent hyponatremia.    Hypernatremia  - ikely secondary to reduced oral intake.  Sodium continues to increase every time IV fluids are discontinued due to inadequate oral intake especially with thickened liquids.  Had extensive discussion with wife.  She does not have realistic expectations about  outcomes with him and thinks that we need to be more aggressive with his cares.  For now she wants him full code and would want feeding tube done if he continues to have dysphagia.  - off iv fluids now, Na today 149--149, recheck BMP in am  - will need further discussion about goal of care, tube feeding if mentation and oral intake will not improve.    Chronic atrial fibrillation  Hx of pulmonary embolism  - PTA on Toprol XL 12.5 mg BID which had been held for the last few days because cannot be crushed? With dysphagia diet?  - /74, HR 90  - will start Metoprolol 12.5 mg po BID  - Holding PTA apixaban in setting of suspected GI bleed  - Initiated discussion regarding risks/benefits of anticoagulation with patient's wife. Conversation with patient limited due to apparent cognitive impairment. Patients wife agrees on holding AC for the time being, but would like to resume conversation re: long term AC with patient's cardiologist, who he has an appointment with on Fri.  Wife called and unfortunately canceled this appointment.  He will not be getting another appointment for a while.  She might have to have conversation regarding anticoagulation with patient's primary care.     Hx aortic stenosis s/p TAVR  * TTE (8/22): EF 50-55%, unable to evaluate WMA due to limited study; mean AV gradient 14 mmHg  - Appears compensated without signs of volume overload  - PTA Torsemide on hold given poor oral inatke     Peripheral neuropathy  - PTA gabapentin has been decreased from 600 to 300 mg qhs in setting of FRED--further decreased to 100 mg at night to see if this will improve his daytime sleepiness.     Otherwise chronic and stable medical conditions  Hx of right TKA on 7/25/2022.  Sutures removed.  COPD  Dyslipidemia  Hx of lung cancer s/p wedge resection (2019)  Hx of non-Hodgkin's Lymphoma, in remission  MGUS  BPH     Diet:   Moist diet with thickened liquids  DVT Prophylaxis: Pneumatic Compression Devices  Suazo  Catheter: Not present  Code Status: Full Code         Disposition:   Discussed patient care with his wife and bedside RN.  Will need TCU when he is stable for discharge.    Time Spent on this Encounter   I spent 35 minutes on the unit/floor managing the care of Hermilo Velasquez. Over 50% of my time was spent on the following:   - Counseling the patient and/or family regarding: diagnostic results, prognosis and risks and benefits of treatment options  - Coordination of care with the: nurse        Eli Membreno MD

## 2022-09-02 NOTE — TELEPHONE ENCOUNTER
This writer spoke to patient's spouse this morning and we mutually agreed based on his current clinical status to remove him from Dr. Grijalva's 9/6 schedule in [place].    I gave her our direct nurse line number to call for any future care coordination along the way.

## 2022-09-02 NOTE — PLAN OF CARE
VSS. More awake and cooperative today. Poor appetite, feeder. Small emesis right after several bites of breakfast. On minced and moist diet. Speech following. Coarse/congested lung sound, productive cough.  Incontinent of bowel and bladder. Changed/ repo every 2 hours. Confused, follows some simple commands. Answered simple yes and no questions. Pulled his IV, no IV access. Will continue to monitor.

## 2022-09-02 NOTE — PLAN OF CARE
"Goal Outcome Evaluation:  Pt very lethargic today, unresponsive, slept most of day, would occasionally shake his head when asked a question, but did not appear to understand any directions. Pt could have delirium as he has h/o hospital acquired. Up to chair w/ Weight shift. Poor appetite, unable to follow commands to take meds. A febrile, no major alteration in labs. IV ABX for pneumonia. Congested cough, LS dim, 2L NC. Rn requested stat ABG labs, as it was noted pt was in acidic state 9/28- await results. Seroquel D/C'd, Zyprexa added PRN for agitation. Wife here today, focused on pt having a \"malt\" and restarting \"Eliquis\".     Plan of Care Reviewed With: patient, spouse     Overall Patient Progress: declining           "

## 2022-09-03 ENCOUNTER — APPOINTMENT (OUTPATIENT)
Dept: GENERAL RADIOLOGY | Facility: CLINIC | Age: 87
DRG: 377 | End: 2022-09-03
Attending: INTERNAL MEDICINE
Payer: COMMERCIAL

## 2022-09-03 PROCEDURE — 250N000013 HC RX MED GY IP 250 OP 250 PS 637: Performed by: HOSPITALIST

## 2022-09-03 PROCEDURE — 250N000013 HC RX MED GY IP 250 OP 250 PS 637: Performed by: INTERNAL MEDICINE

## 2022-09-03 PROCEDURE — 250N000013 HC RX MED GY IP 250 OP 250 PS 637: Performed by: REGISTERED NURSE

## 2022-09-03 PROCEDURE — 99233 SBSQ HOSP IP/OBS HIGH 50: CPT | Performed by: INTERNAL MEDICINE

## 2022-09-03 PROCEDURE — 120N000001 HC R&B MED SURG/OB

## 2022-09-03 PROCEDURE — 74018 RADEX ABDOMEN 1 VIEW: CPT

## 2022-09-03 RX ORDER — METOPROLOL TARTRATE 25 MG/1
25 TABLET, FILM COATED ORAL 2 TIMES DAILY
Status: DISCONTINUED | OUTPATIENT
Start: 2022-09-03 | End: 2022-09-08 | Stop reason: HOSPADM

## 2022-09-03 RX ADMIN — FAMOTIDINE 20 MG: 20 TABLET ORAL at 08:44

## 2022-09-03 RX ADMIN — ACETAMINOPHEN 650 MG: 325 TABLET ORAL at 21:19

## 2022-09-03 RX ADMIN — METOPROLOL TARTRATE 25 MG: 25 TABLET, FILM COATED ORAL at 21:19

## 2022-09-03 RX ADMIN — ACETAMINOPHEN 650 MG: 325 TABLET ORAL at 08:43

## 2022-09-03 RX ADMIN — ACETAMINOPHEN 650 MG: 325 TABLET ORAL at 16:43

## 2022-09-03 RX ADMIN — ATORVASTATIN CALCIUM 10 MG: 10 TABLET, FILM COATED ORAL at 08:44

## 2022-09-03 RX ADMIN — UMECLIDINIUM 1 PUFF: 62.5 AEROSOL, POWDER ORAL at 08:49

## 2022-09-03 RX ADMIN — METOPROLOL TARTRATE 12.5 MG: 25 TABLET, FILM COATED ORAL at 08:44

## 2022-09-03 RX ADMIN — ALFUZOSIN HYDROCHLORIDE 10 MG: 10 TABLET, EXTENDED RELEASE ORAL at 08:44

## 2022-09-03 RX ADMIN — FLUTICASONE FUROATE AND VILANTEROL TRIFENATATE 1 PUFF: 200; 25 POWDER RESPIRATORY (INHALATION) at 08:50

## 2022-09-03 RX ADMIN — GABAPENTIN 100 MG: 100 CAPSULE ORAL at 21:20

## 2022-09-03 ASSESSMENT — ACTIVITIES OF DAILY LIVING (ADL)
ADLS_ACUITY_SCORE: 40
ADLS_ACUITY_SCORE: 38
ADLS_ACUITY_SCORE: 40
ADLS_ACUITY_SCORE: 38
ADLS_ACUITY_SCORE: 40

## 2022-09-03 NOTE — PLAN OF CARE
Goal Outcome Evaluation:  Unable to assess orientation. Nonverbal for the most part. Answered few questions with Yes/No. Inconsistently following commands. Appears comfortable. VSS on 1L O2 overnight. Congested cough.  Incontinent B&B. Turn/repo, not OOB. Discharge plan pending improvement.    Plan of Care Reviewed With: patient     Overall Patient Progress: no change

## 2022-09-03 NOTE — CONSULTS
MD consult - calorie count x3 days    Diet: Minced/moist (L5), Mildly Thick Liquids (L2)           Not Appropriate for Room Service            Magic Cup supplement BID    Chart reviewed  Pt continues with poor po intake - eating ~25% at best    Will begin calorie count 9/4-9/6

## 2022-09-03 NOTE — PROGRESS NOTES
"[facility]    Hospitalist Progress Note    Interval History    Looks better today, awake, alert, talks, confused  - able to say he does not like dysphagia diet and told me \"you try it\"; when I asked him what he likes- he said- \"[employer]\"  - as per RN- he threw up a small amount after breakfast; also starting gagging and throwing up- small amount while I was in the room  - refused blood work today  - cousin and a friend present at the time of my visit, his cousin states the patient is not like himself  - the patient denies chest pain, no SON  - denies abd pain    -Data reviewed today: I reviewed all new labs and imaging results over the last 24 hours. I personally reviewed no images or EKG's today.    Physical Exam   Temp: 98.5  F (36.9  C) Temp src: Oral BP: (!) 144/77 Pulse: 88   Resp: 20 SpO2: 97 % O2 Device: Nasal cannula Oxygen Delivery: 1 LPM  Vitals:    08/31/22 0629 09/02/22 0645 09/03/22 0550   Weight: 71 kg (156 lb 8.4 oz) 70.6 kg (155 lb 10.3 oz) 66.5 kg (146 lb 9.7 oz)     Vital Signs with Ranges  Temp:  [98.5  F (36.9  C)-99.6  F (37.6  C)] 98.5  F (36.9  C)  Pulse:  [83-94] 88  Resp:  [19-20] 20  BP: (127-146)/(65-77) 144/77  SpO2:  [95 %-97 %] 97 %  I/O last 3 completed shifts:  In: 238 [P.O.:238]  Out: 325 [Urine:250; Emesis/NG output:75]    Physical exam:  General: awake, alert, NAD  Head- normocephalic, atraumatic, PERRL  Neck- supple, no cervical lymphadenopathy, no thyromegaly  Pulmonary- decreased respiratory effort, some coarse breath sound, no wheezing  Cardiovasc: irregularly irregular, no murmurs, no rubs  GI- abdomen- soft, nonT, nonD, BS present  Extremities- no leg swelling  Neuro: awake, alert, oriented to self only, no FNDs  Psych- mood seems better today    Medications       acetaminophen  650 mg Oral TID     alfuzosin ER  10 mg Oral Daily     atorvastatin  10 mg Oral Daily     famotidine  20 mg Oral Daily     fluticasone-vilanterol  1 puff Inhalation Daily "     gabapentin  100 mg Oral At Bedtime     metoprolol tartrate  12.5 mg Oral BID     polyethylene glycol  17 g Oral Daily     sodium chloride (PF)  3 mL Intracatheter Q8H     umeclidinium  1 puff Inhalation Daily       Data   Recent Labs   Lab 09/02/22  1535 09/01/22  0746 08/31/22  0829 08/28/22  0620 08/27/22  2245   WBC 4.9  --  4.9  --   --    HGB 8.5* 8.1* 8.5*  8.5*   < > 8.1*   MCV 96  --  98  --   --    *  --  129*  --   --    * 149* 147*  145*   < > 148*   POTASSIUM 3.3* 3.6 3.8  3.7   < > 4.2   CHLORIDE 119* 120* 118*  117*   < > 118*   CO2 28 25 26  26   < > 23   BUN 21 21 18  18   < > 33*   CR 1.00 1.01 0.92  0.92   < > 1.60*   ANIONGAP 2* 4 3  2*   < > 7   AMRITA 9.3 9.0 9.0  9.0   < > 8.4*   * 97 106*  106*   < > 132*   ALBUMIN  --   --  2.1*  --  2.3*   PROTTOTAL  --   --  5.4*  --  5.6*   BILITOTAL  --   --  0.4  --  0.6   ALKPHOS  --   --  80  --  97   ALT  --   --  11  --  12   AST  --   --  10  --  15    < > = values in this interval not displayed.       No results found for this or any previous visit (from the past 24 hour(s)).      Assessment & Plan   Hermilo Velasquez is a 89 year old male with hx of known colonic AVMs, chronic a-fib and recurrent PE on chronic AC with apixaban, aortic stenosis s/p TAVR, COPD, and cognitive impairment who was admitted on 8/23/2022 for acute anemia due to suspected GI bleed     Acute blood loss on chronic anemia due to suspected GI bleed  Hx colonic AVMs  * Referred to ED after routine blood work revealed a Hgb 5.8  * No obvious history of GI bleed however patient has history of AVMs in his colon which was treated with APC on 4/12/2022; also on on chronic AC with apixaban  * Transfused 1u pRBCs in the ED  * [facility] consulted on admission  - Received additional 1u pRBCs on 8/24  - Holding PTA apixaban. Would recommend holding for nw till colonoscopy can be completed   - Not on PPI due to allergy  - Continues on PTA famotidine 40 mg  BID  - [facility] following,  EGD completed : no endoscopic abnormalities noted, colonoscopy deferred due to agitated delirium and hgb stability     Hospital-acquired pneumonia with possible aspiration  -Patient had a worsening mental status 8/26;  ABG did show some acidosis.  Chest x-ray showed bilateral new infiltrates concerning for pneumonia with right greater than left.  Could have a component of aspiration.  Was started on IV cefepime and doxycycline.   Speech eval showed esophageal dysphagia.  Recommended one-to-one supervision with minced and moist food with mildly thick liquids.  - received ABX for 6 days, no fever, no leucocytosis, AMST recommended to stop ABX at this time  - will monitor clinically     Dysphagia  -patient had a videofluoroscopic swallow study.  Unfortunately this was incomplete due to significant lethargy.  Patient did not participate very well.  For now speech would like to continue with minced and moist food with thickened liquids and reevaluate every day.    As per prior hospitalist - Had an extensive conversation with patient's wife and daughter regarding goals of care and CODE STATUS.  For now wife is insistent that everything be done and patient be full code.  Per his daughter is more leaning towards DNR/DNI.  I do not think that patient is fully cognitive to understand what is going on.  Occupational Therapy has been consulted for cognitive evaluation.  Did discuss tube feeds if his oral intake does not improve and if he is not able to keep up with fluid intake.  For now would like to hold off on this.  We will leave the patient off of IV fluids and monitor his sodium status and oral intake over the next 24 to 48 hours and if he does not improve then will have to have another discussion regarding tube feeds.  Palliative care has been consulted and are following along.    - Goals are to continue current restorative cares and encourage Nomi to take in nutrition and liquids by mouth  "with goal of increased intake    - appetite poor, minimal oral intake; wife stated that he does not like the dysphagia diet; the patient himself told me \"you try it\"; he said he likes [employer]   - not sure if poor appetite is because he does not like DD vs other causes; will order an abd Xray to r/o obstruction given his emesis X2 today   - nutrition consult for 3days calorie count  - discussed with RN; will need SLP to reassess to see if diet can be advanced    Cognitive impairment  Delirium  * Patient lives with his wife who is his primary caregiver. Also has home care.  - patient had prior delirium episodes when he had surgery  - PT/OT consulted.  Recommended TCU placement due to ongoing weakness.  - sitter + restraints removed since his delirium resolved at this time.  - CT head 8/27- negative for acute pathology, noted brain atrophy and presumed chronic microvascular ischemic changes   - initially on Seroquel to 50 mg at bedtime and 25 mg every 6 hours for agitation; Seroquel reduced to 25 mg at bedtime on 8/31 due to increased sleepiness during the day.  - 9/2- he remained lethargic, nonverbal today, minimal oral intake  - stopped scheduled Seroquel for now, has Seroquel 25 mg po q6 h prn and Zyprexa 5 mg BID prn available   - UA negative  - not getting any narcotics or benzo; PTA Gabapentin 600 mg at bedtime now decreased to 100 mg po qhs  - ordered ABG on 9/2 but not able to obtain due to the patient being restless   - suspected some baseline cognitive dysfunction.   - 9/3- he seems more awake and alert  - monitor    FRED on CKD stage III, resolved  * Creatinine up to 2.35 from 0.87 on 7/30/22, although creatinine has previously fluctuated in 1.6-1.9 range  * Suspected pre-renal state due to blood loss, borderline hypotension (noted on arrival), home torsemide, and recent use of Bactrim for UTI  * Improved with IV fluids and blood transfusion  -Acute renal failure resolved.  Creatinine back to normal at " 1.00  -  IV fluids discontinued.  - Hold PTA Torsemide for now due to persistent hyponatremia.  - refused blood work today    Hypernatremia  - likely secondary to reduced oral intake.  Sodium continues to increase every time IV fluids are discontinued due to inadequate oral intake especially with thickened liquids.  Had extensive discussion with wife.  She does not have realistic expectations about outcomes with him and thinks that we need to be more aggressive with his cares.  For now she wants him full code and would want feeding tube done if he continues to have dysphagia.  - off iv fluids now, Na 149--149  - refused blood work today; wife is planning to come later on, maybe he will agree with blood draws  - recheck BMP in am  - will need further discussion about goal of care, tube feeding if mentation and oral intake will not improve.    Chronic atrial fibrillation  Hx of pulmonary embolism  - PTA on Toprol XL 12.5 mg BID which had been held for the last few days because cannot be crushed? With dysphagia diet?  - /74, HR 90  - started Metoprolol 12.5 mg po BID-->increase to 25 mg po BID today   - Holding PTA apixaban in setting of suspected GI bleed  - Initiated discussion regarding risks/benefits of anticoagulation with patient's wife. Conversation with patient limited due to apparent cognitive impairment. Patients wife agrees on holding AC for the time being, but would like to resume conversation re: long term AC with patient's cardiologist, who he has an appointment with on Fri.  Wife called and unfortunately canceled this appointment.  He will not be getting another appointment for a while.  She might have to have conversation regarding anticoagulation with patient's primary care.     Hx aortic stenosis s/p TAVR  * TTE (8/22): EF 50-55%, unable to evaluate WMA due to limited study; mean AV gradient 14 mmHg  - Appears compensated without signs of volume overload  - PTA Torsemide on hold given poor oral  inatke     Peripheral neuropathy  - PTA gabapentin has been decreased from 600 to 300 mg qhs in setting of FRED--further decreased to 100 mg at night to see if this will improve his daytime sleepiness.     Otherwise chronic and stable medical conditions  Hx of right TKA on 7/25/2022.  Sutures removed.  COPD  Dyslipidemia  Hx of lung cancer s/p wedge resection (2019)  Hx of non-Hodgkin's Lymphoma, in remission  MGUS  BPH     Diet:   Moist diet with thickened liquids  DVT Prophylaxis: Pneumatic Compression Devices  Suazo Catheter: Not present  Code Status: Full Code         Disposition:   Discussed patient care with his wife and bedside RN.  Will need TCU when he is stable for discharge.    Time Spent on this Encounter   I spent 35 minutes on the unit/floor managing the care of Hermilo Velasquez. Over 50% of my time was spent on the following:   - Counseling the patient and/or family regarding: diagnostic results, prognosis and risks and benefits of treatment options  - Coordination of care with the: nurse        Eli Membreno MD

## 2022-09-03 NOTE — PLAN OF CARE
Goal Outcome Evaluation:    Pt confused see prior note regarding refusing lab draw.  Pt's cousin and wife visiting now state that if the nurse calls pt's wife Roberta when labs drawn this marlin pt states he would let them draw blood. This message will be passed onto next shift.  Pt was up in chair, 2 assists walker and gait belt right knee incision appears healed favors knee when up ambulating. Pt needs assist with feeding and remains to have poor appetite, meds crushed and placed in apple sauce and did take this morning. No iv access.Did have emesis and Dr. Membreno notifed when she called today. See notes bed alarm and chair alarm used

## 2022-09-03 NOTE — PROGRESS NOTES
Dr. Membreno called and wants labs to be drawn from this  Morning. Lab came back and tried to help encourage pt to have his blood drawn and he pulled his arm away and stated no blood from me. Cousin and wife of pt visiting and aware pt refusing and could not convince pt to have blood drawn either.

## 2022-09-03 NOTE — PLAN OF CARE
Goal Outcome Evaluation:  Pt is confused, lethargic. Responds to question at times. [place], hearing aids in place. A2. VSS on 2L O2. Poor appetite, takes pills crushed in apple sauce. Incontinent of B/B. Turn and repo q2hr. Plan: continue to monitor. Discharge pending.

## 2022-09-04 ENCOUNTER — APPOINTMENT (OUTPATIENT)
Dept: SPEECH THERAPY | Facility: CLINIC | Age: 87
DRG: 377 | End: 2022-09-04
Payer: COMMERCIAL

## 2022-09-04 LAB
ANION GAP SERPL CALCULATED.3IONS-SCNC: 4 MMOL/L (ref 3–14)
BUN SERPL-MCNC: 21 MG/DL (ref 7–30)
CALCIUM SERPL-MCNC: 9.5 MG/DL (ref 8.5–10.1)
CHLORIDE BLD-SCNC: 119 MMOL/L (ref 94–109)
CO2 SERPL-SCNC: 29 MMOL/L (ref 20–32)
CREAT SERPL-MCNC: 0.85 MG/DL (ref 0.66–1.25)
GFR SERPL CREATININE-BSD FRML MDRD: 83 ML/MIN/1.73M2
GLUCOSE BLD-MCNC: 102 MG/DL (ref 70–99)
HGB BLD-MCNC: 9.5 G/DL (ref 13.3–17.7)
POTASSIUM BLD-SCNC: 3.1 MMOL/L (ref 3.4–5.3)
POTASSIUM BLD-SCNC: 3.3 MMOL/L (ref 3.4–5.3)
SODIUM SERPL-SCNC: 152 MMOL/L (ref 133–144)

## 2022-09-04 PROCEDURE — 250N000013 HC RX MED GY IP 250 OP 250 PS 637: Performed by: INTERNAL MEDICINE

## 2022-09-04 PROCEDURE — 84132 ASSAY OF SERUM POTASSIUM: CPT | Performed by: INTERNAL MEDICINE

## 2022-09-04 PROCEDURE — 36415 COLL VENOUS BLD VENIPUNCTURE: CPT | Performed by: INTERNAL MEDICINE

## 2022-09-04 PROCEDURE — 120N000001 HC R&B MED SURG/OB

## 2022-09-04 PROCEDURE — 80048 BASIC METABOLIC PNL TOTAL CA: CPT | Performed by: INTERNAL MEDICINE

## 2022-09-04 PROCEDURE — 250N000013 HC RX MED GY IP 250 OP 250 PS 637: Performed by: HOSPITALIST

## 2022-09-04 PROCEDURE — 85018 HEMOGLOBIN: CPT | Performed by: HOSPITALIST

## 2022-09-04 PROCEDURE — 92526 ORAL FUNCTION THERAPY: CPT | Mod: GN

## 2022-09-04 PROCEDURE — 258N000002 HC RX IP 258 OP 250: Performed by: INTERNAL MEDICINE

## 2022-09-04 PROCEDURE — 250N000013 HC RX MED GY IP 250 OP 250 PS 637: Performed by: REGISTERED NURSE

## 2022-09-04 PROCEDURE — 99233 SBSQ HOSP IP/OBS HIGH 50: CPT | Performed by: INTERNAL MEDICINE

## 2022-09-04 RX ORDER — POTASSIUM CHLORIDE 1500 MG/1
40 TABLET, EXTENDED RELEASE ORAL ONCE
Status: COMPLETED | OUTPATIENT
Start: 2022-09-04 | End: 2022-09-04

## 2022-09-04 RX ORDER — SODIUM CHLORIDE 450 MG/100ML
INJECTION, SOLUTION INTRAVENOUS CONTINUOUS
Status: DISCONTINUED | OUTPATIENT
Start: 2022-09-04 | End: 2022-09-06

## 2022-09-04 RX ADMIN — ACETAMINOPHEN 650 MG: 325 TABLET ORAL at 09:04

## 2022-09-04 RX ADMIN — ATORVASTATIN CALCIUM 10 MG: 10 TABLET, FILM COATED ORAL at 09:05

## 2022-09-04 RX ADMIN — POTASSIUM CHLORIDE 40 MEQ: 1500 TABLET, EXTENDED RELEASE ORAL at 12:09

## 2022-09-04 RX ADMIN — FAMOTIDINE 20 MG: 20 TABLET ORAL at 09:05

## 2022-09-04 RX ADMIN — ACETAMINOPHEN 650 MG: 325 TABLET ORAL at 21:08

## 2022-09-04 RX ADMIN — FLUTICASONE FUROATE AND VILANTEROL TRIFENATATE 1 PUFF: 200; 25 POWDER RESPIRATORY (INHALATION) at 09:06

## 2022-09-04 RX ADMIN — ALFUZOSIN HYDROCHLORIDE 10 MG: 10 TABLET, EXTENDED RELEASE ORAL at 09:05

## 2022-09-04 RX ADMIN — SODIUM CHLORIDE: 4.5 INJECTION, SOLUTION INTRAVENOUS at 12:08

## 2022-09-04 RX ADMIN — METOPROLOL TARTRATE 25 MG: 25 TABLET, FILM COATED ORAL at 21:09

## 2022-09-04 RX ADMIN — METOPROLOL TARTRATE 25 MG: 25 TABLET, FILM COATED ORAL at 09:04

## 2022-09-04 RX ADMIN — ACETAMINOPHEN 650 MG: 325 TABLET ORAL at 17:00

## 2022-09-04 RX ADMIN — UMECLIDINIUM 1 PUFF: 62.5 AEROSOL, POWDER ORAL at 09:07

## 2022-09-04 RX ADMIN — GABAPENTIN 100 MG: 100 CAPSULE ORAL at 21:09

## 2022-09-04 ASSESSMENT — ACTIVITIES OF DAILY LIVING (ADL)
ADLS_ACUITY_SCORE: 40
ADLS_ACUITY_SCORE: 40
ADLS_ACUITY_SCORE: 36
ADLS_ACUITY_SCORE: 40
ADLS_ACUITY_SCORE: 36

## 2022-09-04 NOTE — PROGRESS NOTES
[facility]    Hospitalist Progress Note    Interval History    He is not lethargic anymore, mentation improved, awake, alert, talks but remains confused  - poor appetite, ate only few bites for breakfast, as per report  - asked him again what he likes to eat and he said [employer]; also says he likes watermelon  - the patient denies chest pain, no SON  - denies abd pain  - Na up to 152 today, need iv fluids, just had an iv access placed.    -Data reviewed today: I reviewed all new labs and imaging results over the last 24 hours. I personally reviewed no images or EKG's today.    Physical Exam   Temp: 97.6  F (36.4  C) Temp src: Oral BP: 137/68 Pulse: 97   Resp: 16 SpO2: 96 % O2 Device: Nasal cannula Oxygen Delivery: 1 LPM  Vitals:    09/02/22 0645 09/03/22 0550 09/04/22 0600   Weight: 70.6 kg (155 lb 10.3 oz) 66.5 kg (146 lb 9.7 oz) 67.4 kg (148 lb 9.4 oz)     Vital Signs with Ranges  Temp:  [97.2  F (36.2  C)-97.8  F (36.6  C)] 97.6  F (36.4  C)  Pulse:  [78-97] 97  Resp:  [16-18] 16  BP: (137-146)/(68-75) 137/68  SpO2:  [95 %-96 %] 96 %  I/O last 3 completed shifts:  In: 125 [P.O.:125]  Out: 325 [Urine:250; Emesis/NG output:75]    Physical exam:  General: awake, alert, NAD  Head- normocephalic, atraumatic, PERRL  Neck- supple, no cervical lymphadenopathy, no thyromegaly  Pulmonary- decreased respiratory effort, no wheezing, mild crackles right base  Cardiovasc: irregularly irregular, no murmurs, no rubs  GI- abdomen- soft, nonT, nonD, BS present  Extremities- no leg swelling  Neuro: awake, alert, oriented to self only, no FNDs  Psych- mood seems better today    Medications     NaCl         acetaminophen  650 mg Oral TID     alfuzosin ER  10 mg Oral Daily     atorvastatin  10 mg Oral Daily     famotidine  20 mg Oral Daily     fluticasone-vilanterol  1 puff Inhalation Daily     gabapentin  100 mg Oral At Bedtime     metoprolol tartrate  25 mg Oral BID     polyethylene glycol  17 g Oral Daily      potassium chloride  40 mEq Oral Once     sodium chloride (PF)  3 mL Intracatheter Q8H     umeclidinium  1 puff Inhalation Daily       Data   Recent Labs   Lab 09/04/22  0848 09/02/22  1535 09/01/22  0746 08/31/22  0829   WBC  --  4.9  --  4.9   HGB 9.5* 8.5* 8.1* 8.5*  8.5*   MCV  --  96  --  98   PLT  --  148*  --  129*   * 149* 149* 147*  145*   POTASSIUM 3.1* 3.3* 3.6 3.8  3.7   CHLORIDE 119* 119* 120* 118*  117*   CO2 29 28 25 26  26   BUN 21 21 21 18  18   CR 0.85 1.00 1.01 0.92  0.92   ANIONGAP 4 2* 4 3  2*   AMRITA 9.5 9.3 9.0 9.0  9.0   * 130* 97 106*  106*   ALBUMIN  --   --   --  2.1*   PROTTOTAL  --   --   --  5.4*   BILITOTAL  --   --   --  0.4   ALKPHOS  --   --   --  80   ALT  --   --   --  11   AST  --   --   --  10       Recent Results (from the past 24 hour(s))   XR Abdomen Port 1 View    Narrative    EXAM: XR ABDOMEN PORT 1 VIEW  LOCATION: [facility]  DATE/TIME: 9/3/2022 5:02 PM    INDICATION: Emesis; suspicion of small bowel obstruction.  COMPARISON: No prior radiographic imaging for comparison.      Impression    IMPRESSION: Coarse interstitial opacities in the lung bases. Median sternotomy wires present.    Bowel gas pattern is nonspecific, due to a paucity of large and small bowel gas. Consider CT imaging for further assessment.         Assessment & Plan   Hermilo Velasquez is a 89 year old male with hx of known colonic AVMs, chronic a-fib and recurrent PE on chronic AC with apixaban, aortic stenosis s/p TAVR, COPD, and cognitive impairment who was admitted on 8/23/2022 for acute anemia due to suspected GI bleed     Acute blood loss on chronic anemia due to suspected GI bleed  Hx colonic AVMs  * Referred to ED after routine blood work revealed a Hgb 5.8  * No obvious history of GI bleed however patient has history of AVMs in his colon which was treated with APC on 4/12/2022; also on on chronic AC with apixaban  * Transfused 1u pRBCs in the  ED  * [facility] consulted on admission  - Received additional 1u pRBCs on 8/24  - Holding PTA apixaban. Would recommend holding for nw till colonoscopy can be completed   - Not on PPI due to allergy  - Continues on PTA famotidine 40 mg BID  - [facility] following,  EGD completed : no endoscopic abnormalities noted, colonoscopy deferred due to agitated delirium and hgb stability     Hospital-acquired pneumonia with possible aspiration  -Patient had a worsening mental status 8/26;  ABG did show some acidosis.  Chest x-ray showed bilateral new infiltrates concerning for pneumonia with right greater than left.  Could have a component of aspiration.  Was started on IV cefepime and doxycycline.   Speech eval showed esophageal dysphagia.  Recommended one-to-one supervision with minced and moist food with mildly thick liquids.  - received ABX for 6 days, no fever, no leucocytosis, AMST recommended to stop ABX at this time  - will monitor clinically     Dysphagia  -patient had a videofluoroscopic swallow study.  Unfortunately this was incomplete due to significant lethargy.  Patient did not participate very well.  For now speech would like to continue with minced and moist food with thickened liquids and reevaluate every day.    As per prior hospitalist - Had an extensive conversation with patient's wife and daughter regarding goals of care and CODE STATUS.  For now wife is insistent that everything be done and patient be full code.  Per his daughter is more leaning towards DNR/DNI.  I do not think that patient is fully cognitive to understand what is going on.  Occupational Therapy has been consulted for cognitive evaluation.  Did discuss tube feeds if his oral intake does not improve and if he is not able to keep up with fluid intake.  For now would like to hold off on this.  We will leave the patient off of IV fluids and monitor his sodium status and oral intake over the next 24 to 48 hours and if he does not improve then  "will have to have another discussion regarding tube feeds.  Palliative care has been consulted and are following along.    - Goals are to continue current restorative cares and encourage Nomi to take in nutrition and liquids by mouth with goal of increased intake    Palliative care not available over the wekend    - appetite poor, minimal oral intake; wife stated that he does not like the dysphagia diet; the patient himself told me \"you try it\"; he said he likes [employer]   - not sure if poor appetite is because he does not like DD vs other causes; had 2 episodes of emesis yesterday  - abd Xray-  Bowel gas pattern is nonspecific; no abd pain.    - nutrition consult for 3days calorie count  - discussed with RN; will need SLP to reassess to see if diet can be advanced, hoping that he will eat more if he would be on a different diet    Cognitive impairment  Delirium  * Patient lives with his wife who is his primary caregiver. Also has home care.  - patient had prior delirium episodes when he had surgery  - PT/OT consulted.  Recommended TCU placement due to ongoing weakness.  - sitter + restraints removed since his delirium resolved at this time.  - CT head 8/27- negative for acute pathology, noted brain atrophy and presumed chronic microvascular ischemic changes   - initially on Seroquel to 50 mg at bedtime and 25 mg every 6 hours for agitation; Seroquel reduced to 25 mg at bedtime on 8/31 due to increased sleepiness during the day.  - 9/2- he remained lethargic, nonverbal today, minimal oral intake  - stopped scheduled Seroquel for now, has Seroquel 25 mg po q6 h prn and Zyprexa 5 mg BID prn available   - UA negative  - not getting any narcotics or benzo; PTA Gabapentin 600 mg at bedtime now decreased to 100 mg po qhs  - ordered ABG on 9/2 but not able to obtain due to the patient being restless   - suspected some baseline cognitive dysfunction.   - 9/3- 9/4- he is awake and alert but confused  - monitor    FRED on " CKD stage III, resolved  * Creatinine up to 2.35 from 0.87 on 7/30/22, although creatinine has previously fluctuated in 1.6-1.9 range  * Suspected pre-renal state due to blood loss, borderline hypotension (noted on arrival), home torsemide, and recent use of Bactrim for UTI  * Improved with IV fluids and blood transfusion  - Acute renal failure resolved.  Creatinine back to normal at 1.00  -  IV fluids discontinued but will restart 1/2 NS at 75cc/h today because of hypernatremia.  - Hold PTA Torsemide for now due to persistent hypernatremia.  - BMP today- cr 0.85    Hypernatremia  - likely secondary to reduced oral intake.  Sodium continues to increase every time IV fluids are discontinued due to inadequate oral intake especially with thickened liquids.  Had extensive discussion with wife.  She does not have realistic expectations about outcomes with him and thinks that we need to be more aggressive with his cares.  For now she wants him full code and would want feeding tube done if he continues to have dysphagia.  - was off iv fluids Na 149--149  - refusing blood work at times, so difficult to assess his BMP  - had BMP today, Na up to 152  - will restart 1/2NS at 75cc/h  - repeat BMP in am  - will need further discussion about goal of care, tube feeding if oral intake will not improve.    Chronic atrial fibrillation  Hx of pulmonary embolism  - PTA on Toprol XL 12.5 mg BID which had been held for the last few days because cannot be crushed? With dysphagia diet?  - /74, HR 90  - started Metoprolol 12.5 mg po BID-->increased to 25 mg po BID on 9/3  - Holding PTA apixaban in setting of suspected GI bleed  - Initiated discussion regarding risks/benefits of anticoagulation with patient's wife. Conversation with patient limited due to apparent cognitive impairment. Patients wife agrees on holding AC for the time being, but would like to resume conversation re: long term AC with patient's cardiologist, who he has an  appointment with on Fri.  Wife called and unfortunately canceled this appointment.  He will not be getting another appointment for a while.  She might have to have conversation regarding anticoagulation with patient's primary care.     Hx aortic stenosis s/p TAVR  * TTE (8/22): EF 50-55%, unable to evaluate WMA due to limited study; mean AV gradient 14 mmHg  - Appears compensated without signs of volume overload  - PTA Torsemide on hold given poor oral inatke     Peripheral neuropathy  - PTA gabapentin has been decreased from 600 to 300 mg qhs in setting of FRED--further decreased to 100 mg at night to see if this will improve his daytime sleepiness.     Otherwise chronic and stable medical conditions  Hx of right TKA on 7/25/2022.  Sutures removed.  COPD  Dyslipidemia  Hx of lung cancer s/p wedge resection (2019)  Hx of non-Hodgkin's Lymphoma, in remission  MGUS  BPH     Diet:   Moist diet with thickened liquids  DVT Prophylaxis: Pneumatic Compression Devices  Suazo Catheter: Not present  Code Status: Full Code         Disposition:   Discussed patient care with his wife and bedside RN.  Will need TCU when he is stable for discharge.    Time Spent on this Encounter   I spent 35 minutes on the unit/floor managing the care of Hermilo Velasquez. Over 50% of my time was spent on the following:   - Counseling the patient and/or family regarding: diagnostic results, prognosis and risks and benefits of treatment options  - Coordination of care with the: nurse        Eli Membreno MD

## 2022-09-04 NOTE — PLAN OF CARE
Pt alert to self but disoriented to place, time and situation. Pt followed commands. Pt is AX1 w/walker and gait belt. Ambulated to the bathroom a couple of times. Pt drank lots of fluids this evening. Xray done in room when wife was present. Will continue to monitor.

## 2022-09-04 NOTE — PLAN OF CARE
Goal Outcome Evaluation:  Pt disoriented to time, place, and situation. Appears comfortable. VSS on 1L O2 overnight. Congested cough.  Incontinent B&B. Turning self in bed. Up Ax1-2 gb/walker. Discharge plan pending improvement.    Plan of Care Reviewed With: patient     Overall Patient Progress: no change

## 2022-09-04 NOTE — PLAN OF CARE
Goal Outcome Evaluation:  Pt up in chair this shift and tolerating . Pt is more verbal today. Iv started for ivf started per order see emar. Pt agreeable to having labs drawn this morning . Potassium is 3.1 and replacement given.  O2 on 1 liter per n/c productive cough noted lungs diminished . Needing medications crushed in pudding or apple sauce then tolerates but is slow to swallow.  Poor appetite remains today on calorie count. Voids in urinal at times right knee incision appears healed no swelling or redness noted. Walks with NA via transfer belt and walker.  afebrile

## 2022-09-05 LAB
ANION GAP SERPL CALCULATED.3IONS-SCNC: 5 MMOL/L (ref 3–14)
BUN SERPL-MCNC: 20 MG/DL (ref 7–30)
CALCIUM SERPL-MCNC: 8.8 MG/DL (ref 8.5–10.1)
CHLORIDE BLD-SCNC: 114 MMOL/L (ref 94–109)
CO2 SERPL-SCNC: 27 MMOL/L (ref 20–32)
CREAT SERPL-MCNC: 0.92 MG/DL (ref 0.66–1.25)
GFR SERPL CREATININE-BSD FRML MDRD: 80 ML/MIN/1.73M2
GLUCOSE BLD-MCNC: 85 MG/DL (ref 70–99)
HGB BLD-MCNC: 8.3 G/DL (ref 13.3–17.7)
POTASSIUM BLD-SCNC: 3.3 MMOL/L (ref 3.4–5.3)
POTASSIUM BLD-SCNC: 3.6 MMOL/L (ref 3.4–5.3)
SODIUM SERPL-SCNC: 146 MMOL/L (ref 133–144)

## 2022-09-05 PROCEDURE — 250N000013 HC RX MED GY IP 250 OP 250 PS 637: Performed by: REGISTERED NURSE

## 2022-09-05 PROCEDURE — 250N000013 HC RX MED GY IP 250 OP 250 PS 637: Performed by: INTERNAL MEDICINE

## 2022-09-05 PROCEDURE — 99232 SBSQ HOSP IP/OBS MODERATE 35: CPT | Performed by: INTERNAL MEDICINE

## 2022-09-05 PROCEDURE — 84132 ASSAY OF SERUM POTASSIUM: CPT | Performed by: INTERNAL MEDICINE

## 2022-09-05 PROCEDURE — 999N000127 HC STATISTIC PERIPHERAL IV START W US GUIDANCE

## 2022-09-05 PROCEDURE — 85018 HEMOGLOBIN: CPT | Performed by: HOSPITALIST

## 2022-09-05 PROCEDURE — 82310 ASSAY OF CALCIUM: CPT | Performed by: INTERNAL MEDICINE

## 2022-09-05 PROCEDURE — 36415 COLL VENOUS BLD VENIPUNCTURE: CPT | Performed by: INTERNAL MEDICINE

## 2022-09-05 PROCEDURE — 250N000013 HC RX MED GY IP 250 OP 250 PS 637: Performed by: HOSPITALIST

## 2022-09-05 PROCEDURE — 258N000002 HC RX IP 258 OP 250: Performed by: INTERNAL MEDICINE

## 2022-09-05 PROCEDURE — 120N000001 HC R&B MED SURG/OB

## 2022-09-05 RX ORDER — POTASSIUM CHLORIDE 1.5 G/1.58G
40 POWDER, FOR SOLUTION ORAL ONCE
Status: COMPLETED | OUTPATIENT
Start: 2022-09-05 | End: 2022-09-05

## 2022-09-05 RX ADMIN — GABAPENTIN 100 MG: 100 CAPSULE ORAL at 21:00

## 2022-09-05 RX ADMIN — FLUTICASONE FUROATE AND VILANTEROL TRIFENATATE 1 PUFF: 200; 25 POWDER RESPIRATORY (INHALATION) at 09:13

## 2022-09-05 RX ADMIN — SODIUM CHLORIDE: 4.5 INJECTION, SOLUTION INTRAVENOUS at 00:21

## 2022-09-05 RX ADMIN — ACETAMINOPHEN 650 MG: 325 TABLET ORAL at 21:00

## 2022-09-05 RX ADMIN — ALFUZOSIN HYDROCHLORIDE 10 MG: 10 TABLET, EXTENDED RELEASE ORAL at 09:12

## 2022-09-05 RX ADMIN — FAMOTIDINE 20 MG: 20 TABLET ORAL at 09:12

## 2022-09-05 RX ADMIN — ACETAMINOPHEN 650 MG: 325 TABLET ORAL at 09:11

## 2022-09-05 RX ADMIN — ACETAMINOPHEN 650 MG: 325 TABLET ORAL at 16:35

## 2022-09-05 RX ADMIN — METOPROLOL TARTRATE 25 MG: 25 TABLET, FILM COATED ORAL at 21:00

## 2022-09-05 RX ADMIN — POTASSIUM CHLORIDE 40 MEQ: 1.5 POWDER, FOR SOLUTION ORAL at 10:27

## 2022-09-05 RX ADMIN — UMECLIDINIUM 1 PUFF: 62.5 AEROSOL, POWDER ORAL at 09:13

## 2022-09-05 RX ADMIN — SODIUM CHLORIDE: 4.5 INJECTION, SOLUTION INTRAVENOUS at 14:48

## 2022-09-05 RX ADMIN — METOPROLOL TARTRATE 25 MG: 25 TABLET, FILM COATED ORAL at 09:12

## 2022-09-05 RX ADMIN — ATORVASTATIN CALCIUM 10 MG: 10 TABLET, FILM COATED ORAL at 09:12

## 2022-09-05 ASSESSMENT — ACTIVITIES OF DAILY LIVING (ADL)
ADLS_ACUITY_SCORE: 40
ADLS_ACUITY_SCORE: 36
ADLS_ACUITY_SCORE: 40

## 2022-09-05 NOTE — PLAN OF CARE
Goal Outcome Evaluation:  Pt is Alert to self, confused. Appears comfortable. VSS on 1L O2. Congested cough. Urinal to bedside, Incontinent at times.Tolerated some oral intake this AM. PIV infusing @ 75mL/hr. Up A1-2 GBW, up in chair. Plan : continue to monitor. Discharge pending TCU.

## 2022-09-05 NOTE — PLAN OF CARE
Goal Outcome Evaluation:  Pt disoriented to time, place, and situation. Appears comfortable. VSS on 1L O2 overnight. Congested cough.  Incontinent overnight. PIV infusing 1/2NS at 75 ml/hr. Up Ax1-2 gb/walker. Discharge plan pending improvement.    Plan of Care Reviewed With: patient     Overall Patient Progress: no change

## 2022-09-05 NOTE — PLAN OF CARE
Pt alert to self but disoriented to place, time and situation. Pt was very pleasant and followed commands. Pt is AX1 w/walker and gait belt. Ambulated to the bathroom twice this shift. Pt continued to drink lots of fluids this evening. Pt has IV infusing @75ml/hr.Will continue to monitor.

## 2022-09-05 NOTE — PROGRESS NOTES
[facility]    Hospitalist Progress Note    Interval History    Awake, alert, mentation improved, talks but remains confused  - poor appetite but it seems that he drank some juice in am  - the patient denies chest pain, no SON  - denies abd pain  - no family members present      -Data reviewed today: I reviewed all new labs and imaging results over the last 24 hours. I personally reviewed no images or EKG's today.    Physical Exam   Temp: 97.6  F (36.4  C) Temp src: Oral BP: 114/64 Pulse: 79   Resp: 18 SpO2: 94 % O2 Device: Nasal cannula Oxygen Delivery: 1 LPM  Vitals:    09/03/22 0550 09/04/22 0600 09/05/22 0624   Weight: 66.5 kg (146 lb 9.7 oz) 67.4 kg (148 lb 9.4 oz) 66.9 kg (147 lb 7.8 oz)     Vital Signs with Ranges  Temp:  [97.4  F (36.3  C)-98.2  F (36.8  C)] 97.6  F (36.4  C)  Pulse:  [79-88] 79  Resp:  [16-18] 18  BP: (113-141)/(59-70) 114/64  SpO2:  [92 %-96 %] 94 %  I/O last 3 completed shifts:  In: 1805 [P.O.:570; I.V.:1235]  Out: 150 [Urine:150]    Physical exam:  General: awake, alert, NAD  Head- normocephalic, atraumatic, PERRL  Neck- supple, no cervical lymphadenopathy, no thyromegaly  Pulmonary- decreased respiratory effort, no wheezing, mild crackles right base  Cardiovasc: irregularly irregular, no murmurs, no rubs  GI- abdomen- soft, nonT, nonD, BS present  Extremities- no leg swelling  Neuro: awake, alert, oriented to self only, no FNDs  Psych- normal mood    Medications     NaCl 75 mL/hr at 09/05/22 1448       acetaminophen  650 mg Oral TID     alfuzosin ER  10 mg Oral Daily     atorvastatin  10 mg Oral Daily     famotidine  20 mg Oral Daily     fluticasone-vilanterol  1 puff Inhalation Daily     gabapentin  100 mg Oral At Bedtime     metoprolol tartrate  25 mg Oral BID     polyethylene glycol  17 g Oral Daily     sodium chloride (PF)  3 mL Intracatheter Q8H     umeclidinium  1 puff Inhalation Daily       Data   Recent Labs   Lab 09/05/22  0705 09/04/22  2535  09/04/22  0848 09/02/22  1535 09/01/22  0746 08/31/22  0829   WBC  --   --   --  4.9  --  4.9   HGB 8.3*  --  9.5* 8.5*   < > 8.5*  8.5*   MCV  --   --   --  96  --  98   PLT  --   --   --  148*  --  129*   *  --  152* 149*   < > 147*  145*   POTASSIUM 3.3* 3.3* 3.1* 3.3*   < > 3.8  3.7   CHLORIDE 114*  --  119* 119*   < > 118*  117*   CO2 27  --  29 28   < > 26  26   BUN 20  --  21 21   < > 18  18   CR 0.92  --  0.85 1.00   < > 0.92  0.92   ANIONGAP 5  --  4 2*   < > 3  2*   AMRITA 8.8  --  9.5 9.3   < > 9.0  9.0   GLC 85  --  102* 130*   < > 106*  106*   ALBUMIN  --   --   --   --   --  2.1*   PROTTOTAL  --   --   --   --   --  5.4*   BILITOTAL  --   --   --   --   --  0.4   ALKPHOS  --   --   --   --   --  80   ALT  --   --   --   --   --  11   AST  --   --   --   --   --  10    < > = values in this interval not displayed.       No results found for this or any previous visit (from the past 24 hour(s)).      Assessment & Plan   Hermilo Velasquez is a 89 year old male with hx of known colonic AVMs, chronic a-fib and recurrent PE on chronic AC with apixaban, aortic stenosis s/p TAVR, COPD, and cognitive impairment who was admitted on 8/23/2022 for acute anemia due to suspected GI bleed     Acute blood loss on chronic anemia due to suspected GI bleed  Hx colonic AVMs  * Referred to ED after routine blood work revealed a Hgb 5.8  * No obvious history of GI bleed however patient has history of AVMs in his colon which was treated with APC on 4/12/2022; also on on chronic AC with apixaban  * Transfused 1u pRBCs in the ED  * [facility] consulted on admission  - Received additional 1u pRBCs on 8/24  - Holding PTA apixaban. Would recommend holding for nw till colonoscopy can be completed   - Not on PPI due to allergy  - Continues on PTA famotidine 40 mg BID  - [facility] following,  EGD completed : no endoscopic abnormalities noted, colonoscopy deferred due to agitated delirium and hgb stability      Hospital-acquired pneumonia with possible aspiration  -Patient had a worsening mental status 8/26;  ABG did show some acidosis.  Chest x-ray showed bilateral new infiltrates concerning for pneumonia with right greater than left.  Could have a component of aspiration.  Was started on IV cefepime and doxycycline.   Speech eval showed esophageal dysphagia.  Recommended one-to-one supervision with minced and moist food with mildly thick liquids.  - received ABX for 6 days, no fever, no leucocytosis, AMST recommended to stop ABX at this time  - will monitor clinically     Dysphagia  -patient had a videofluoroscopic swallow study.  Unfortunately this was incomplete due to significant lethargy.  Patient did not participate very well.  For now speech would like to continue with minced and moist food with thickened liquids and reevaluate every day.    As per prior hospitalist - Had an extensive conversation with patient's wife and daughter regarding goals of care and CODE STATUS.  For now wife is insistent that everything be done and patient be full code.  Per his daughter is more leaning towards DNR/DNI.  I do not think that patient is fully cognitive to understand what is going on.  Occupational Therapy has been consulted for cognitive evaluation.  Did discuss tube feeds if his oral intake does not improve and if he is not able to keep up with fluid intake.  For now would like to hold off on this.  We will leave the patient off of IV fluids and monitor his sodium status and oral intake over the next 24 to 48 hours and if he does not improve then will have to have another discussion regarding tube feeds.  Palliative care has been consulted and are following along.    - Goals are to continue current restorative cares and encourage Nomi to take in nutrition and liquids by mouth with goal of increased intake    Palliative care not available over the weekend, hopefully will be able to see him again on Tuesday    - appetite  "poor, minimal oral intake; wife stated that he does not like the dysphagia diet; the patient himself told me \"you try it\"; he said he likes [employer]   - not sure if poor appetite is because he does not like DD vs other causes; had 2 episodes of emesis on 9/3, none since then.  - abd Xray-  Bowel gas pattern is nonspecific; no abd pain.    - nutrition consult for 3days calorie count  - discussed with RN; will need SLP to reassess to see if diet can be advanced, hoping that he will eat more if he would be on a different diet  - diet changed to minced and moist diet with thin liquids     Cognitive impairment  Delirium  Lethargy- improved  * Patient lives with his wife who is his primary caregiver. Also has home care.  - patient had prior delirium episodes when he had surgery  - PT/OT consulted.  Recommended TCU placement due to ongoing weakness.  - sitter + restraints removed since his delirium resolved at this time.  - CT head 8/27- negative for acute pathology, noted brain atrophy and presumed chronic microvascular ischemic changes   - initially on Seroquel to 50 mg at bedtime and 25 mg every 6 hours for agitation; Seroquel reduced to 25 mg at bedtime on 8/31 due to increased sleepiness during the day.  - 9/2- he remained lethargic, nonverbal today, minimal oral intake  - stopped scheduled Seroquel for now, has Seroquel 25 mg po q6 h prn and Zyprexa 5 mg BID prn available   - UA negative  - not getting any narcotics or benzo; PTA Gabapentin 600 mg at bedtime now decreased to 100 mg po qhs  - ordered ABG on 9/2 but not able to obtain due to the patient being restless   - suspected some baseline cognitive dysfunction.   - since Seroquel was stopped, lethargy improved, talks, remains confused    FRED on CKD stage III, resolved  * Creatinine up to 2.35 from 0.87 on 7/30/22, although creatinine has previously fluctuated in 1.6-1.9 range  * Suspected pre-renal state due to blood loss, borderline hypotension (noted on " arrival), home torsemide, and recent use of Bactrim for UTI  * Improved with IV fluids and blood transfusion  - Acute renal failure resolved.  Creatinine back to normal at 1.00  -  IV fluids discontinued but will restart 1/2 NS at 75cc/h today because of hypernatremia.  - Hold PTA Torsemide for now due to persistent hypernatremia.  - BMP today- cr 0.85    Hypernatremia  - likely secondary to reduced oral intake.  Sodium continues to increase every time IV fluids are discontinued due to inadequate oral intake especially with thickened liquids.  Had extensive discussion with wife.  She does not have realistic expectations about outcomes with him and thinks that we need to be more aggressive with his cares.  For now she wants him full code and would want feeding tube done if he continues to have dysphagia.  - was off iv fluids Na 149--149  - refusing blood work at times, so difficult to assess his BMP  - BMP on 9/4- Na up to 152  - restarted 1/2NS at 75cc/h  - BMP in am- Na 146  - will need further discussion about goal of care, tube feeding if oral intake will not improve after changing dysphagia diet.    Hypokalemia  - K 3.3--3.1  - K replacement protocol  - K 3.6 on 9/5    Chronic atrial fibrillation  Hx of pulmonary embolism  - PTA on Toprol XL 12.5 mg BID which had been held for the last few days because cannot be crushed? With dysphagia diet?  - /74, HR 90  - started Metoprolol 12.5 mg po BID-->increased to 25 mg po BID on 9/3  - Holding PTA apixaban in setting of suspected GI bleed  - Initiated discussion regarding risks/benefits of anticoagulation with patient's wife. Conversation with patient limited due to apparent cognitive impairment. Patients wife agrees on holding AC for the time being, but would like to resume conversation re: long term AC with patient's cardiologist, who he has an appointment with on Fri.  Wife called and unfortunately canceled this appointment.  He will not be getting another  appointment for a while.  She might have to have conversation regarding anticoagulation with patient's primary care.     Hx aortic stenosis s/p TAVR  * TTE (8/22): EF 50-55%, unable to evaluate WMA due to limited study; mean AV gradient 14 mmHg  - Appears compensated without signs of volume overload  - PTA Torsemide on hold given poor oral intake     Peripheral neuropathy  - PTA gabapentin has been decreased from 600 to 300 mg qhs in setting of FRED--further decreased to 100 mg at night to see if this will improve his daytime sleepiness.     Otherwise chronic and stable medical conditions  Hx of right TKA on 7/25/2022.  Sutures removed.  COPD  Dyslipidemia  Hx of lung cancer s/p wedge resection (2019)  Hx of non-Hodgkin's Lymphoma, in remission  MGUS  BPH     Diet:   Moist diet with thickened liquids  DVT Prophylaxis: Pneumatic Compression Devices  Suazo Catheter: Not present  Code Status: Full Code         Disposition:   Discussed patient care with his wife and bedside RN.  Will need TCU when he is stable for discharge.        Eli Membreno MD

## 2022-09-05 NOTE — PROGRESS NOTES
CALORIE COUNT      Approximate Oral Intake for:    9/4  Calories: 4 kcal  Protein: 0 grams      Number of Meals Recorded:     0    Number of Snacks Recorded:   0      Estimated Needs:    Dosing Weight = 66.5 kg (lowest weight this admission on 9/3)  Calories: 1424-6715 kcal (25-30 kcal/kg) - maintenance   Protein: 65-80+ grams (1-1.2+ g/kg) - minimum maintenance per age for preservation of lean body mass      Summary:   Patient took a couple bites of cereal and applesauce yesterday. No other PO intake recorded/documented.     Full nutrition assessment with recommendations to be completed following 3 day calorie count.   Calorie count to continue through 9/6.     Please record all oral intake over the next 48 hours to accurately assess nutrition status.     Dana Olson RD, LD  Weekend/Holiday Pager: [phone]

## 2022-09-06 ENCOUNTER — APPOINTMENT (OUTPATIENT)
Dept: SPEECH THERAPY | Facility: CLINIC | Age: 87
DRG: 377 | End: 2022-09-06
Payer: COMMERCIAL

## 2022-09-06 ENCOUNTER — APPOINTMENT (OUTPATIENT)
Dept: OCCUPATIONAL THERAPY | Facility: CLINIC | Age: 87
DRG: 377 | End: 2022-09-06
Payer: COMMERCIAL

## 2022-09-06 LAB
ANION GAP SERPL CALCULATED.3IONS-SCNC: 4 MMOL/L (ref 3–14)
BUN SERPL-MCNC: 17 MG/DL (ref 7–30)
CALCIUM SERPL-MCNC: 8.5 MG/DL (ref 8.5–10.1)
CHLORIDE BLD-SCNC: 112 MMOL/L (ref 94–109)
CO2 SERPL-SCNC: 27 MMOL/L (ref 20–32)
CREAT SERPL-MCNC: 0.82 MG/DL (ref 0.66–1.25)
GFR SERPL CREATININE-BSD FRML MDRD: 84 ML/MIN/1.73M2
GLUCOSE BLD-MCNC: 119 MG/DL (ref 70–99)
HGB BLD-MCNC: 8.1 G/DL (ref 13.3–17.7)
POTASSIUM BLD-SCNC: 3.6 MMOL/L (ref 3.4–5.3)
SODIUM SERPL-SCNC: 143 MMOL/L (ref 133–144)

## 2022-09-06 PROCEDURE — 82310 ASSAY OF CALCIUM: CPT | Performed by: INTERNAL MEDICINE

## 2022-09-06 PROCEDURE — 250N000013 HC RX MED GY IP 250 OP 250 PS 637: Performed by: INTERNAL MEDICINE

## 2022-09-06 PROCEDURE — 258N000002 HC RX IP 258 OP 250: Performed by: INTERNAL MEDICINE

## 2022-09-06 PROCEDURE — 120N000001 HC R&B MED SURG/OB

## 2022-09-06 PROCEDURE — 99233 SBSQ HOSP IP/OBS HIGH 50: CPT | Performed by: HOSPITALIST

## 2022-09-06 PROCEDURE — 92526 ORAL FUNCTION THERAPY: CPT | Mod: GN

## 2022-09-06 PROCEDURE — 82947 ASSAY GLUCOSE BLOOD QUANT: CPT | Performed by: INTERNAL MEDICINE

## 2022-09-06 PROCEDURE — 85018 HEMOGLOBIN: CPT | Performed by: HOSPITALIST

## 2022-09-06 PROCEDURE — 97535 SELF CARE MNGMENT TRAINING: CPT | Mod: GO | Performed by: OCCUPATIONAL THERAPIST

## 2022-09-06 PROCEDURE — 250N000013 HC RX MED GY IP 250 OP 250 PS 637: Performed by: REGISTERED NURSE

## 2022-09-06 PROCEDURE — 36415 COLL VENOUS BLD VENIPUNCTURE: CPT | Performed by: INTERNAL MEDICINE

## 2022-09-06 PROCEDURE — 250N000013 HC RX MED GY IP 250 OP 250 PS 637: Performed by: HOSPITALIST

## 2022-09-06 RX ADMIN — ALFUZOSIN HYDROCHLORIDE 10 MG: 10 TABLET, EXTENDED RELEASE ORAL at 08:25

## 2022-09-06 RX ADMIN — UMECLIDINIUM 1 PUFF: 62.5 AEROSOL, POWDER ORAL at 08:29

## 2022-09-06 RX ADMIN — FLUTICASONE FUROATE AND VILANTEROL TRIFENATATE 1 PUFF: 200; 25 POWDER RESPIRATORY (INHALATION) at 08:29

## 2022-09-06 RX ADMIN — METOPROLOL TARTRATE 25 MG: 25 TABLET, FILM COATED ORAL at 08:24

## 2022-09-06 RX ADMIN — MICONAZOLE NITRATE: 2 POWDER TOPICAL at 13:21

## 2022-09-06 RX ADMIN — POLYETHYLENE GLYCOL 3350 17 G: 17 POWDER, FOR SOLUTION ORAL at 08:29

## 2022-09-06 RX ADMIN — ACETAMINOPHEN 650 MG: 325 TABLET ORAL at 08:23

## 2022-09-06 RX ADMIN — ATORVASTATIN CALCIUM 10 MG: 10 TABLET, FILM COATED ORAL at 08:25

## 2022-09-06 RX ADMIN — SODIUM CHLORIDE: 4.5 INJECTION, SOLUTION INTRAVENOUS at 11:34

## 2022-09-06 RX ADMIN — ACETAMINOPHEN 650 MG: 325 TABLET ORAL at 16:28

## 2022-09-06 RX ADMIN — METOPROLOL TARTRATE 25 MG: 25 TABLET, FILM COATED ORAL at 21:14

## 2022-09-06 RX ADMIN — ACETAMINOPHEN 650 MG: 325 TABLET ORAL at 21:14

## 2022-09-06 RX ADMIN — MICONAZOLE NITRATE: 2 POWDER TOPICAL at 21:15

## 2022-09-06 RX ADMIN — FAMOTIDINE 20 MG: 20 TABLET ORAL at 08:37

## 2022-09-06 RX ADMIN — GABAPENTIN 100 MG: 100 CAPSULE ORAL at 21:14

## 2022-09-06 ASSESSMENT — ACTIVITIES OF DAILY LIVING (ADL)
ADLS_ACUITY_SCORE: 36
ADLS_ACUITY_SCORE: 36
ADLS_ACUITY_SCORE: 40
ADLS_ACUITY_SCORE: 36
ADLS_ACUITY_SCORE: 40
ADLS_ACUITY_SCORE: 36
ADLS_ACUITY_SCORE: 40
ADLS_ACUITY_SCORE: 36
ADLS_ACUITY_SCORE: 40

## 2022-09-06 NOTE — PLAN OF CARE
Problem: Admitted 8/23 for suspected GI bleed initially, HA pneumonia  Hx: AFIB, recent TKA, cognitive impairment  Vitals: VSS on 1 L  Orientation/Neuro: A/O to self & place  Activity Level: A1 GBW  Diet: Thin & minced foods, poor appetite  GI: No BM this shift  Lung: LS diminished, infrequent congested cough  : Inc of urine at times unless prompted to use restroom  Labs: K replaced on days, recheck was 3.6  IV Fluids/Drains/Tubes: PIV infusing 1/2 NS 75ml/hr   Pain Management: Scheduled Tylenol for pain  Plan: Discharge pending TCU placement

## 2022-09-06 NOTE — PROGRESS NOTES
[facility]    Hospitalist Progress Note    Interval History   Patient awake and alert.  Way more communicative.  Has significant improvement with oral fluid intake once fluid consistency has been changed.  Remarkable improvement in his encephalopathy.  Close to baseline at this time.    -Data reviewed today: I reviewed all new labs and imaging results over the last 24 hours. I personally reviewed no images or EKG's today.    Physical Exam   Temp: 98.3  F (36.8  C) Temp src: Axillary BP: 119/58 Pulse: 75   Resp: 17 SpO2: 95 % O2 Device: Nasal cannula Oxygen Delivery: 1 LPM  Vitals:    09/04/22 0600 09/05/22 0624 09/06/22 0629   Weight: 67.4 kg (148 lb 9.4 oz) 66.9 kg (147 lb 7.8 oz) 68.7 kg (151 lb 8 oz)     Vital Signs with Ranges  Temp:  [97.4  F (36.3  C)-98.4  F (36.9  C)] 98.3  F (36.8  C)  Pulse:  [58-77] 75  Resp:  [17-18] 17  BP: (106-129)/(44-67) 119/58  SpO2:  [92 %-96 %] 95 %  I/O last 3 completed shifts:  In: 1005 [P.O.:630; I.V.:375]  Out: -     Physical Exam  Constitutional:       Appearance: Normal appearance.      Comments: Old and frail.  Has baseline forgetfulness.  Answering questions appropriately.   HENT:      Head: Normocephalic.   Eyes:      Pupils: Pupils are equal, round, and reactive to light.   Cardiovascular:      Rate and Rhythm: Normal rate and regular rhythm.      Pulses: Normal pulses.      Heart sounds: Normal heart sounds.   Pulmonary:      Effort: Pulmonary effort is normal. No respiratory distress.      Breath sounds: Normal breath sounds.   Abdominal:      General: Abdomen is flat. Bowel sounds are normal. There is no distension.      Tenderness: There is no abdominal tenderness. There is no guarding.   Musculoskeletal:         General: Normal range of motion.      Cervical back: Normal range of motion.      Comments: Right knee surgical site appears stable.   Skin:     General: Skin is warm and dry.   Neurological:      General: No focal deficit present.    Psychiatric:         Mood and Affect: Mood normal.           Medications       acetaminophen  650 mg Oral TID     alfuzosin ER  10 mg Oral Daily     atorvastatin  10 mg Oral Daily     famotidine  20 mg Oral Daily     fluticasone-vilanterol  1 puff Inhalation Daily     gabapentin  100 mg Oral At Bedtime     metoprolol tartrate  25 mg Oral BID     miconazole   Topical BID     polyethylene glycol  17 g Oral Daily     sodium chloride (PF)  3 mL Intracatheter Q8H     umeclidinium  1 puff Inhalation Daily       Data   Recent Labs   Lab 09/06/22  0951 09/05/22  1538 09/05/22  0705 09/04/22  1616 09/04/22  0848 09/02/22  1535 09/01/22  0746 08/31/22  0829   WBC  --   --   --   --   --  4.9  --  4.9   HGB 8.1*  --  8.3*  --  9.5* 8.5*   < > 8.5*  8.5*   MCV  --   --   --   --   --  96  --  98   PLT  --   --   --   --   --  148*  --  129*     --  146*  --  152* 149*   < > 147*  145*   POTASSIUM 3.6 3.6 3.3*   < > 3.1* 3.3*   < > 3.8  3.7   CHLORIDE 112*  --  114*  --  119* 119*   < > 118*  117*   CO2 27  --  27  --  29 28   < > 26  26   BUN 17  --  20  --  21 21   < > 18  18   CR 0.82  --  0.92  --  0.85 1.00   < > 0.92  0.92   ANIONGAP 4  --  5  --  4 2*   < > 3  2*   AMRITA 8.5  --  8.8  --  9.5 9.3   < > 9.0  9.0   *  --  85  --  102* 130*   < > 106*  106*   ALBUMIN  --   --   --   --   --   --   --  2.1*   PROTTOTAL  --   --   --   --   --   --   --  5.4*   BILITOTAL  --   --   --   --   --   --   --  0.4   ALKPHOS  --   --   --   --   --   --   --  80   ALT  --   --   --   --   --   --   --  11   AST  --   --   --   --   --   --   --  10    < > = values in this interval not displayed.       No results found for this or any previous visit (from the past 24 hour(s)).      Assessment & Plan   Hermilo Velasquez is a 89 year old male with hx of known colonic AVMs, chronic a-fib and recurrent PE on chronic AC with apixaban, aortic stenosis s/p TAVR, COPD, and cognitive impairment who was admitted on  8/23/2022 for acute anemia due to suspected GI bleed     Acute blood loss on chronic anemia due to suspected GI bleed  Hx colonic AVMs  * Referred to ED after routine blood work revealed a Hgb 5.8  * No obvious history of GI bleed however patient has history of AVMs in his colon which was treated with APC on 4/12/2022; also on on chronic AC with apixaban  * Transfused 1u pRBCs in the ED  * [facility] consulted on admission  - Received additional 1u pRBCs on 8/24  - Holding PTA apixaban. Would recommend holding for nw till colonoscopy can be completed   - Not on PPI due to allergy  - Continues on PTA famotidine 40 mg BID  - [facility] following,  EGD completed : no endoscopic abnormalities noted, colonoscopy deferred due to agitated delirium and hgb stability     Hospital-acquired pneumonia with possible aspiration  -Patient had a worsening mental status 8/26;  ABG did show some acidosis.  Chest x-ray showed bilateral new infiltrates concerning for pneumonia with right greater than left.  Could have a component of aspiration.  Was started on IV cefepime and doxycycline.   Speech eval showed esophageal dysphagia.  Recommended one-to-one supervision with minced and moist food with mildly thick liquids.  - received ABX for 6 days, no fever, no leucocytosis, AMST recommended to stop ABX at this time  - will monitor clinically     Dysphagia  -patient had a videofluoroscopic swallow study.  Unfortunately this was incomplete due to significant lethargy.  Patient did not participate very well.  For now speech would like to continue with minced and moist food with thickened liquids and reevaluate every day.    As per prior hospitalist - Had an extensive conversation with patient's wife and daughter regarding goals of care and CODE STATUS.  For now wife is insistent that everything be done and patient be full code.  Per his daughter is more leaning towards DNR/DNI.  I do not think that patient is fully cognitive to understand  what is going on.  Occupational Therapy has been consulted for cognitive evaluation.  Did discuss tube feeds if his oral intake does not improve and if he is not able to keep up with fluid intake.  For now would like to hold off on this.  We will leave the patient off of IV fluids and monitor his sodium status and oral intake over the next 24 to 48 hours and if he does not improve then will have to have another discussion regarding tube feeds.  Palliative care has been consulted and are following along.    - Goals are to continue current restorative cares and encourage Nomi to take in nutrition and liquids by mouth with goal of increased intake    Palliative care will reevaluate him today.    Appetite seems to be improving today.  He is having improving fluid intake.  We will discontinue IV normal saline today and reevaluate sodium levels tomorrow.    - nutrition consult for 3days calorie count  - discussed with RN; will need SLP to reassess to see if diet can be advanced, hoping that he will eat more if he would be on a different diet  - diet changed to minced and moist diet with thin liquids     Cognitive impairment  Delirium  Lethargy- improved  * Patient lives with his wife who is his primary caregiver. Also has home care.  - patient had prior delirium episodes when he had surgery  - PT/OT consulted.  Recommended TCU placement due to ongoing weakness.  - sitter + restraints removed since his delirium resolved at this time.  - CT head 8/27- negative for acute pathology, noted brain atrophy and presumed chronic microvascular ischemic changes   - initially on Seroquel to 50 mg at bedtime and 25 mg every 6 hours for agitation; Seroquel reduced to 25 mg at bedtime on 8/31 due to increased sleepiness during the day.  - 9/2- he remained lethargic, nonverbal today, minimal oral intake  - stopped scheduled Seroquel for now, has Seroquel 25 mg po q6 h prn and Zyprexa 5 mg BID prn available   - UA negative  - not getting  any narcotics or benzo; PTA Gabapentin 600 mg at bedtime now decreased to 100 mg po qhs  - ordered ABG on 9/2 but not able to obtain due to the patient being restless   - suspected some baseline cognitive dysfunction.   - since Seroquel was stopped, lethargy improved, talks, confusion seems to be improving as well.    FRED on CKD stage III, resolved  * Creatinine up to 2.35 from 0.87 on 7/30/22, although creatinine has previously fluctuated in 1.6-1.9 range  * Suspected pre-renal state due to blood loss, borderline hypotension (noted on arrival), home torsemide, and recent use of Bactrim for UTI  * Improved with IV fluids and blood transfusion  - Acute renal failure resolved.  Creatinine back to normal at 1.00  -  IV fluids discontinued but will restart 1/2 NS at 75cc/h today because of hypernatremia.  - Hold PTA Torsemide for now due to persistent hypernatremia.  - BMP today- cr 0.82    Hypernatremia  - likely secondary to reduced oral intake.  Sodium continues to increase every time IV fluids are discontinued due to inadequate oral intake especially with thickened liquids.  Had extensive discussion with wife.  She does not have realistic expectations about outcomes with him and thinks that we need to be more aggressive with his cares.  For now she wants him full code and would want feeding tube done if he continues to have dysphagia.  - was off iv fluids Na 149--149  - refusing blood work at times, so difficult to assess his BMP  - BMP on 9/4- Na up to 152  - restarted 1/2NS at 75cc/h  - BMP in am- Na 146---143 .   -Stop IV fluids again on 9/6/2022 and recheck sodium in the morning.  Has had improved oral intake since yesterday.  - will need further discussion about goal of care, tube feeding if oral intake will not improve after changing dysphagia diet.    Chronic atrial fibrillation  Hx of pulmonary embolism  - PTA on Toprol XL 12.5 mg BID which had been held for the last few days because cannot be crushed? With  dysphagia diet?  - /74, HR 90  - started Metoprolol 12.5 mg po BID-->increased to 25 mg po BID on 9/3  - Holding PTA apixaban in setting of suspected GI bleed  - Initiated discussion regarding risks/benefits of anticoagulation with patient's wife. Conversation with patient limited due to apparent cognitive impairment. Patients wife agrees on holding AC for the time being, but would like to resume conversation re: long term AC with patient's cardiologist, who he has an appointment with on Fri.  Wife called and unfortunately canceled this appointment.  He will not be getting another appointment for a while.  She might have to have conversation regarding anticoagulation with patient's primary care.     Hx aortic stenosis s/p TAVR  * TTE (8/22): EF 50-55%, unable to evaluate WMA due to limited study; mean AV gradient 14 mmHg  - Appears compensated without signs of volume overload  - PTA Torsemide on hold given poor oral intake     Peripheral neuropathy  - PTA gabapentin has been decreased from 600 to 300 mg qhs in setting of FRED--further decreased to 100 mg at night to see if this will improve his daytime sleepiness.     Otherwise chronic and stable medical conditions  Hx of right TKA on 7/25/2022.  Sutures removed.  COPD  Dyslipidemia  Hx of lung cancer s/p wedge resection (2019)  Hx of non-Hodgkin's Lymphoma, in remission  MGUS  BPH     Diet:   Moist diet with thin liquids  DVT Prophylaxis: Pneumatic Compression Devices  Suazo Catheter: Not present  Code Status: Full Code         Disposition:   Discussed patient care with his wife and bedside RN.  Will need TCU when he is stable for discharge.          Loni Yuen MD, MD  [phone](p)  [phone]( c)

## 2022-09-06 NOTE — PLAN OF CARE
[id number]    A&O to self only. Oriented at times to place. VSS on 1L. Congested cough w/ product. Incontinent at times - urinal within reach. PIV SL. Up Ax1 gb/walker. Applied meplix to coccyx. Groin irritation noted - miconazole powder order added. Up in chair for most of shift. On thin liquids and minced/moist diet. Ate roughly 25% of each meal. Needs encouraging to eat and drink. Discharge pending improvement. Wife called and updated her. Will continue to monitor.

## 2022-09-06 NOTE — PROGRESS NOTES
CALORIE COUNT      Approximate Oral Intake for:    Monday 9/5  Calories: 572 kcal  Protein: 23 grams   Diet: Minced and Moist; thin liquids  Magic Cup BID  Not appropriate for room service status       Estimated Needs:    Dosing Weight = 66.5 kg (lowest weight this admission on 9/3)  Calories: 2137-4163 kcal (25-30 kcal/kg) - maintenance   Protein: 65-80+ grams (1-1.2+ g/kg) - minimum maintenance per age for preservation of lean body mass        Summary:   Patient received 3 meals and 2 supplements, consuming only ~25% of 2 meals and 100% of 1 supplement  Oral intake met 9 kcal/kg and 0.3 g protein/kg - which equals 35% of estimated protein/calorie needs   This is an improvement from near negligible PO the day prior, however, patient still unable to meet even 50% of his estimated nutrient needs at this time and likely throughout entire hospitalization     RD assessment note w/ recommendations to to be completed following 3 day calorie count (last day of intake recordings today which will be documented tomorrow w/ summary)  Palliative noted to be seeing patient again early this week       Paola Lambert, RD, LD  Clinical Dietitian

## 2022-09-06 NOTE — PLAN OF CARE
Goal Outcome Evaluation:  Pt disoriented to time, place, and situation. Appears comfortable. VSS on 1L O2 overnight. Congested cough.  Incontinent overnight. PIV infusing 1/2NS at 75 ml/hr. Up Ax1 gb/walker. Discharge plan pending improvement. Palliative to see again today.    Plan of Care Reviewed With: patient     Overall Patient Progress: no change

## 2022-09-07 ENCOUNTER — APPOINTMENT (OUTPATIENT)
Dept: SPEECH THERAPY | Facility: CLINIC | Age: 87
DRG: 377 | End: 2022-09-07
Payer: COMMERCIAL

## 2022-09-07 ENCOUNTER — APPOINTMENT (OUTPATIENT)
Dept: PHYSICAL THERAPY | Facility: CLINIC | Age: 87
DRG: 377 | End: 2022-09-07
Payer: COMMERCIAL

## 2022-09-07 LAB
ANION GAP SERPL CALCULATED.3IONS-SCNC: 3 MMOL/L (ref 3–14)
BUN SERPL-MCNC: 16 MG/DL (ref 7–30)
CALCIUM SERPL-MCNC: 8.4 MG/DL (ref 8.5–10.1)
CHLORIDE BLD-SCNC: 110 MMOL/L (ref 94–109)
CO2 SERPL-SCNC: 28 MMOL/L (ref 20–32)
CREAT SERPL-MCNC: 0.82 MG/DL (ref 0.66–1.25)
GFR SERPL CREATININE-BSD FRML MDRD: 84 ML/MIN/1.73M2
GLUCOSE BLD-MCNC: 86 MG/DL (ref 70–99)
HGB BLD-MCNC: 8 G/DL (ref 13.3–17.7)
POTASSIUM BLD-SCNC: 3.5 MMOL/L (ref 3.4–5.3)
POTASSIUM BLD-SCNC: 3.5 MMOL/L (ref 3.4–5.3)
SODIUM SERPL-SCNC: 141 MMOL/L (ref 133–144)

## 2022-09-07 PROCEDURE — 85018 HEMOGLOBIN: CPT | Performed by: HOSPITALIST

## 2022-09-07 PROCEDURE — 250N000013 HC RX MED GY IP 250 OP 250 PS 637: Performed by: INTERNAL MEDICINE

## 2022-09-07 PROCEDURE — 250N000013 HC RX MED GY IP 250 OP 250 PS 637: Performed by: REGISTERED NURSE

## 2022-09-07 PROCEDURE — 120N000001 HC R&B MED SURG/OB

## 2022-09-07 PROCEDURE — 97530 THERAPEUTIC ACTIVITIES: CPT | Mod: GP | Performed by: PHYSICAL THERAPIST

## 2022-09-07 PROCEDURE — 92526 ORAL FUNCTION THERAPY: CPT | Mod: GN

## 2022-09-07 PROCEDURE — 250N000013 HC RX MED GY IP 250 OP 250 PS 637: Performed by: NURSE PRACTITIONER

## 2022-09-07 PROCEDURE — 99233 SBSQ HOSP IP/OBS HIGH 50: CPT | Performed by: HOSPITALIST

## 2022-09-07 PROCEDURE — 97116 GAIT TRAINING THERAPY: CPT | Mod: GP | Performed by: PHYSICAL THERAPIST

## 2022-09-07 PROCEDURE — 99233 SBSQ HOSP IP/OBS HIGH 50: CPT | Performed by: NURSE PRACTITIONER

## 2022-09-07 PROCEDURE — 80048 BASIC METABOLIC PNL TOTAL CA: CPT | Performed by: HOSPITALIST

## 2022-09-07 PROCEDURE — 36415 COLL VENOUS BLD VENIPUNCTURE: CPT | Performed by: HOSPITALIST

## 2022-09-07 RX ORDER — GABAPENTIN 100 MG/1
200 CAPSULE ORAL AT BEDTIME
Status: DISCONTINUED | OUTPATIENT
Start: 2022-09-07 | End: 2022-09-08 | Stop reason: HOSPADM

## 2022-09-07 RX ADMIN — ALFUZOSIN HYDROCHLORIDE 10 MG: 10 TABLET, EXTENDED RELEASE ORAL at 08:51

## 2022-09-07 RX ADMIN — ATORVASTATIN CALCIUM 10 MG: 10 TABLET, FILM COATED ORAL at 08:51

## 2022-09-07 RX ADMIN — ACETAMINOPHEN 650 MG: 325 TABLET ORAL at 08:50

## 2022-09-07 RX ADMIN — FAMOTIDINE 20 MG: 20 TABLET ORAL at 08:51

## 2022-09-07 RX ADMIN — FLUTICASONE FUROATE AND VILANTEROL TRIFENATATE 1 PUFF: 200; 25 POWDER RESPIRATORY (INHALATION) at 09:00

## 2022-09-07 RX ADMIN — METOPROLOL TARTRATE 25 MG: 25 TABLET, FILM COATED ORAL at 08:51

## 2022-09-07 RX ADMIN — ACETAMINOPHEN 650 MG: 325 TABLET ORAL at 16:00

## 2022-09-07 RX ADMIN — UMECLIDINIUM 1 PUFF: 62.5 AEROSOL, POWDER ORAL at 09:00

## 2022-09-07 RX ADMIN — GABAPENTIN 200 MG: 100 CAPSULE ORAL at 22:01

## 2022-09-07 RX ADMIN — MICONAZOLE NITRATE: 2 POWDER TOPICAL at 22:00

## 2022-09-07 RX ADMIN — ACETAMINOPHEN 650 MG: 325 TABLET ORAL at 22:00

## 2022-09-07 RX ADMIN — MICONAZOLE NITRATE: 2 POWDER TOPICAL at 09:00

## 2022-09-07 ASSESSMENT — ACTIVITIES OF DAILY LIVING (ADL)
ADLS_ACUITY_SCORE: 36

## 2022-09-07 NOTE — PLAN OF CARE
Goal Outcome Evaluation:  Pt is alert to self, time and place, disoriented to situation. Appears comfortable. VSS on 1L O2. Congested cough. Tolerating minced and thin diet. Urinal to bedside, Incontinent at times. Mepilex to coccyx. Irritation to richard groin, miconazole applied. PIV SL. Up A1-2 GBW, up in chair. Plan : continue to monitor. Discharge pending TCU.

## 2022-09-07 NOTE — CONSULTS
"CLINICAL NUTRITION SERVICES  -  ASSESSMENT NOTE      Recommendations Ordered by Registered Dietitian (RD):     Will discontinue the Magic Cup supplements per pt request (prefers regular ice cream)    Made pt Room Service with Assist - he will be seen daily for meal ordering, so he can select meal preferences (vs being sent standard meal trays)    Anticipate po intake will continue to improve with diet advancement and improving encephalopathy - would hold off on tube feeding for now     Malnutrition:   % Weight Loss:  > 5% in 1 month (severe malnutrition) - 6% wt loss past month  % Intake:  </= 50% for >/= 5 days (severe malnutrition) - po intake has been decreased during this admit   Subcutaneous Fat Loss:  None observed  Muscle Loss:  Clavicle bone region - mild depletion  Fluid Retention:  None noted    Malnutrition Diagnosis: Severe malnutrition  In Context of:  Acute illness or injury          REASON FOR ASSESSMENT  Hermilo Velasquez is a 89 year old male seen by Registered Dietitian for Provider Order - Calorie Count Summary      NUTRITION HISTORY  Chart reviewed    Visited with pt this morning  Tells me that he likes: milk, juice, coffee with cream, ice cream, veggies, fruit (especially grapes)      CURRENT NUTRITION ORDERS  Diet Order:     9/6: Easy to Chew (L7), Thin Liquids (per SLP recs)         Magic Cup at 10am and 2pm         Not Appropriate for Room Service - has been receiving standard meal trays    Calorie count average over the past 3 days = ~500 cals (29% est needs) and ~20 gm pro (~28% needs)  Intake has continued to improve daily - suspect continued improvement with diet advancement    Pt sitting up in chair during visit  Tells me that he is eating better  Doesn't like the magic cup supplements - \"prefer to have regular ice cream\"      NUTRITION FOCUSED PHYSICAL ASSESSMENT FOR DIAGNOSING MALNUTRITION)  Yes              Observed:    Muscle wasting (refer to documentation in Malnutrition " "section)    Obtained from Chart/Interdisciplinary Team:  Pneumonia     ANTHROPOMETRICS  Height: 5' 8\"  Weight:(9/6 - standing wt) 68.7 kg / 151 lbs 8 oz  Body mass index is 23.04 kg/m .  Weight Status:  Normal BMI  IBW: 70 kg  % IBW: 98%  Weight History:   Records reflect ~10# (6%) wt loss in the past month    09/06/22 0629 68.7 kg (151 lb 8 oz) Standing scale   09/05/22 0624 66.9 kg (147 lb 7.8 oz) Bed scale   09/04/22 0600 67.4 kg (148 lb 9.4 oz) Bed scale   09/03/22 0550 66.5 kg (146 lb 9.7 oz) Bed scale   09/02/22 0645 70.6 kg (155 lb 10.3 oz) Bed scale   08/31/22 0629 71 kg (156 lb 8.4 oz) Bed scale   08/30/22 0600 70.7 kg (155 lb 13.8 oz) Bed scale   08/29/22 0620 71.3 kg (157 lb 3 oz) Bed scale   08/28/22 0659 71 kg (156 lb 8.4 oz) Bed scale   08/27/22 0600 72 kg (158 lb 11.7 oz) Bed scale       Wt Readings from Last 10 Encounters:   09/06/22 68.7 kg (151 lb 8 oz)   08/23/22 75.3 kg (165 lb 14.4 oz)   08/15/22 76.2 kg (168 lb)   08/11/22 75.4 kg (166 lb 4.8 oz)   08/10/22 74.2 kg (163 lb 8 oz)   08/07/22 73 kg (161 lb)   08/04/22 71.2 kg (157 lb)   08/01/22 71.1 kg (156 lb 12.8 oz)   07/31/22 71.4 kg (157 lb 4.8 oz)   07/26/22 75.8 kg (167 lb 1.7 oz)         LABS  Labs reviewed    MEDICATIONS  Medications reviewed      ASSESSED NUTRITION NEEDS PER APPROVED PRACTICE GUIDELINES:    Dosing Weight 68.7 kg  Estimated Energy Needs: 9907-7117 kcals (25-30 Kcal/Kg)  Justification: maintenance  Estimated Protein Needs: 70-80 grams protein (1-1.2 g pro/Kg)  Justification: maintenance  Estimated Fluid Needs: 9466-8373  mL (1 mL/Kcal)  Justification: maintenance    MALNUTRITION:  % Weight Loss:  > 5% in 1 month (severe malnutrition) - 6% wt loss past month  % Intake:  </= 50% for >/= 5 days (severe malnutrition) - po intake has been decreased during this admit   Subcutaneous Fat Loss:  None observed  Muscle Loss:  Clavicle bone region - mild depletion  Fluid Retention:  None noted    Malnutrition Diagnosis: Severe " malnutrition  In Context of:  Acute illness or injury    NUTRITION DIAGNOSIS:  Inadequate oral intake related to dysphagia as evidenced by pt meeting <50% estimated needs on average over the past 3 days      NUTRITION INTERVENTIONS  Recommendations / Nutrition Prescription  Easy to Chew (L7), Thin Liquids    Will discontinue the Magic Cup supplements per pt request (prefers regular ice cream)  Made pt Room Service with Assist - he will be seen daily for meal ordering, so he can select meal preferences (vs being sent standard meal trays)  Anticipate po intake will continue to improve with diet advancement and improving encephalopathy - would hold off on tube feeding for now  .      Implementation  Nutrition education: Not appropriate at this time due to patient condition    .      Nutrition Goals  Pt to consume 75% meals  .      MONITORING AND EVALUATION:  Progress towards goals will be monitored and evaluated per protocol and Practice Guidelines

## 2022-09-07 NOTE — PLAN OF CARE
[id number]    A&O to self, time and place. VSS on RA. Weaned off of O2. Incontinent at times - urinal within reach. PIV SL. Up Ax1 gb/walker. Meplix to coccyx. Miconazole powder to groin area. Up in chair for most of shift. On thin liquids and easy to chew diet. Tolerated well with diet advancement. Needs encouraging at times. Discharge pending improvement. Will continue to monitor.

## 2022-09-07 NOTE — PROGRESS NOTES
[facility]    Hospitalist Progress Note    Interval History   Patient awake and alert.  Continues to have good oral intake with fluids.  Sodium has remained stable.  No acute complaints at this time.  Stable on room air.    -Data reviewed today: I reviewed all new labs and imaging results over the last 24 hours. I personally reviewed no images or EKG's today.    Physical Exam   Temp: 97.4  F (36.3  C) Temp src: Oral BP: 117/51 Pulse: 68   Resp: 18 SpO2: 95 % O2 Device: None (Room air) Oxygen Delivery: 1 LPM  Vitals:    09/04/22 0600 09/05/22 0624 09/06/22 0629   Weight: 67.4 kg (148 lb 9.4 oz) 66.9 kg (147 lb 7.8 oz) 68.7 kg (151 lb 8 oz)     Vital Signs with Ranges  Temp:  [97.3  F (36.3  C)-98.2  F (36.8  C)] 97.4  F (36.3  C)  Pulse:  [68-75] 68  Resp:  [18] 18  BP: (101-128)/(51-74) 117/51  SpO2:  [92 %-96 %] 95 %  I/O last 3 completed shifts:  In: 1110 [P.O.:1110]  Out: -     Physical Exam  Constitutional:       Appearance: Normal appearance.      Comments: Old and frail.  Has baseline forgetfulness.  Answering questions appropriately.   HENT:      Head: Normocephalic.   Eyes:      Pupils: Pupils are equal, round, and reactive to light.   Cardiovascular:      Rate and Rhythm: Normal rate and regular rhythm.      Pulses: Normal pulses.      Heart sounds: Normal heart sounds.   Pulmonary:      Effort: Pulmonary effort is normal. No respiratory distress.      Breath sounds: Normal breath sounds.   Abdominal:      General: Abdomen is flat. Bowel sounds are normal. There is no distension.      Tenderness: There is no abdominal tenderness. There is no guarding.   Musculoskeletal:         General: Normal range of motion.      Cervical back: Normal range of motion.      Comments: Right knee surgical site appears stable.   Skin:     General: Skin is warm and dry.   Neurological:      General: No focal deficit present.   Psychiatric:         Mood and Affect: Mood normal.           Medications        acetaminophen  650 mg Oral TID     alfuzosin ER  10 mg Oral Daily     atorvastatin  10 mg Oral Daily     famotidine  20 mg Oral Daily     fluticasone-vilanterol  1 puff Inhalation Daily     gabapentin  200 mg Oral At Bedtime     metoprolol tartrate  25 mg Oral BID     miconazole   Topical BID     polyethylene glycol  17 g Oral Daily     sodium chloride (PF)  3 mL Intracatheter Q8H     umeclidinium  1 puff Inhalation Daily       Data   Recent Labs   Lab 09/07/22  0819 09/06/22  0951 09/05/22  1538 09/05/22  0705 09/04/22  0848 09/02/22  1535   WBC  --   --   --   --   --  4.9   HGB 8.0* 8.1*  --  8.3*   < > 8.5*   MCV  --   --   --   --   --  96   PLT  --   --   --   --   --  148*    143  --  146*   < > 149*   POTASSIUM 3.5  3.5 3.6 3.6 3.3*   < > 3.3*   CHLORIDE 110* 112*  --  114*   < > 119*   CO2 28 27  --  27   < > 28   BUN 16 17  --  20   < > 21   CR 0.82 0.82  --  0.92   < > 1.00   ANIONGAP 3 4  --  5   < > 2*   AMRITA 8.4* 8.5  --  8.8   < > 9.3   GLC 86 119*  --  85   < > 130*    < > = values in this interval not displayed.       No results found for this or any previous visit (from the past 24 hour(s)).      Assessment & Plan   Hermilo Velasquez is a 89 year old male with hx of known colonic AVMs, chronic a-fib and recurrent PE on chronic AC with apixaban, aortic stenosis s/p TAVR, COPD, and cognitive impairment who was admitted on 8/23/2022 for acute anemia due to suspected GI bleed     Acute blood loss on chronic anemia due to suspected GI bleed  Hx colonic AVMs  * Referred to ED after routine blood work revealed a Hgb 5.8  * No obvious history of GI bleed however patient has history of AVMs in his colon which was treated with APC on 4/12/2022; also on on chronic AC with apixaban  * Transfused 1u pRBCs in the ED  * [facility] consulted on admission  - Received additional 1u pRBCs on 8/24  - Holding PTA apixaban. Would recommend holding for nw till colonoscopy can be completed   - Not on PPI due  to allergy  - Continues on PTA famotidine 40 mg BID  - [facility] following,  EGD completed : no endoscopic abnormalities noted, colonoscopy deferred due to agitated delirium and hgb stability   -Hemoglobin slowly trending down.  Continue to monitor and transfuse as necessary for hemoglobin less than 7.  For now hold off on any further procedures until he is recovered with therapy.    Hospital-acquired pneumonia with possible aspiration  -Patient had a worsening mental status 8/26;  ABG did show some acidosis.  Chest x-ray showed bilateral new infiltrates concerning for pneumonia with right greater than left.  Could have a component of aspiration.  Was started on IV cefepime and doxycycline.   Speech eval showed esophageal dysphagia.  Recommended one-to-one supervision with minced and moist food with mildly thick liquids.  - received ABX for 6 days, no fever, no leucocytosis, AMST recommended to stop ABX at this time  - will monitor clinically     Dysphagia  -Patient initially had significant dysphagia and was on minced and moist diet with thickened liquids.  He did not tolerate this well and kept having reduced intake with hypernatremia.  -VFSS was attempted but could not be completed due to his altered mental status on 8/31/2022  -Since then we have had discussions regarding tube feeds and decided to hold off on this and continue to work medically.  Patient improved significantly over the last 1 week with improved mentation to the point of returning to baseline.  Speech therapy continue to evaluate him and did liberalize his diet to thin liquids.  -Patient has had improved oral intake since thin liquids were started and his hypernatremia resolved without needing any further IV fluids.  -Continue with nutrition consult with 3-day calorie count.    Palliative care consult  -Goals of care discussion was had with his wife and daughter at bedside when patient was significantly confused.  At that time they wanted to  continue with full code and aggressive measures.    Cognitive impairment  Delirium  Lethargy- improved  * Patient lives with his wife who is his primary caregiver. Also has home care.  - patient had prior delirium episodes when he had surgery  - PT/OT consulted.  Recommended TCU placement due to ongoing weakness.  - sitter + restraints removed since his delirium resolved at this time.  - CT head 8/27- negative for acute pathology, noted brain atrophy and presumed chronic microvascular ischemic changes   - initially on Seroquel to 50 mg at bedtime and 25 mg every 6 hours for agitation; Seroquel reduced to 25 mg at bedtime on 8/31 due to increased sleepiness during the day.  - 9/2- he remained lethargic, nonverbal today, minimal oral intake  - stopped scheduled Seroquel for now, has Seroquel 25 mg po q6 h prn and Zyprexa 5 mg BID prn available   - UA negative  - not getting any narcotics or benzo; PTA Gabapentin 600 mg at bedtime now decreased to 100 mg po qhs.  Patient did improve with his mentation after his Seroquel was discontinued and gabapentin was reduced.  Will slowly start tapering gabapentin. increased to 200 mg today.  - ordered ABG on 9/2 but not able to obtain due to the patient being restless   - suspected some baseline cognitive dysfunction.   - since Seroquel was stopped, lethargy improved, talks, confusion seems to be improving as well.    FRED on CKD stage III, resolved  * Creatinine up to 2.35 from 0.87 on 7/30/22, although creatinine has previously fluctuated in 1.6-1.9 range  * Suspected pre-renal state due to blood loss, borderline hypotension (noted on arrival), home torsemide, and recent use of Bactrim for UTI  * Improved with IV fluids and blood transfusion  - Acute renal failure resolved.  Creatinine back to normal at 1.00      Hypernatremia  - likely secondary to reduced oral intake.  Sodium continues to increase every time IV fluids are discontinued due to inadequate oral intake especially  with thickened liquids.  Had extensive discussion with wife.  She does not have realistic expectations about outcomes with him and thinks that we need to be more aggressive with his cares.  For now she wants him full code and would want feeding tube done if he continues to have dysphagia.  - was off iv fluids Na 149--149  - refusing blood work at times, so difficult to assess his BMP  - BMP on 9/4- Na up to 152  - restarted 1/2NS at 75cc/h  - BMP in am- Na 146---143 .   -Stop IV fluids again on 9/6/2022 and recheck sodium in the morning.  Has had improved oral intake since yesterday.  -Sodium remained within normal limits today despite not getting any IV fluids yesterday.  Oral intake improved.  Per natremia resolved at this time.    Chronic atrial fibrillation  Hx of pulmonary embolism  - PTA on Toprol XL 12.5 mg BID which had been held for the last few days because cannot be crushed? With dysphagia diet?  - /74, HR 90  - started Metoprolol 12.5 mg po BID-->increased to 25 mg po BID on 9/3  - Holding PTA apixaban in setting of suspected GI bleed  - Initiated discussion regarding risks/benefits of anticoagulation with patient's wife. Conversation with patient limited due to apparent cognitive impairment. Patients wife agrees on holding AC for the time being, but would like to resume conversation re: long term AC with patient's cardiologist, who he has an appointment with on Fri.  Wife called and unfortunately canceled this appointment.  He will not be getting another appointment for a while.  She might have to have conversation regarding anticoagulation with patient's primary care.     Hx aortic stenosis s/p TAVR  * TTE (8/22): EF 50-55%, unable to evaluate WMA due to limited study; mean AV gradient 14 mmHg  - Appears compensated without signs of volume overload  - PTA Torsemide on hold given poor oral intake     Peripheral neuropathy  - PTA gabapentin has been decreased from 600 to 300 mg qhs in setting of  FRED--further decreased to 100 mg at night to see if this will improve his daytime sleepiness.-Diet slowly titrating back up to 200 mg at night.     Otherwise chronic and stable medical conditions  Hx of right TKA on 7/25/2022.  Sutures removed.  COPD  Dyslipidemia  Hx of lung cancer s/p wedge resection (2019)  Hx of non-Hodgkin's Lymphoma, in remission  MGUS  BPH     Diet:   Moist diet with thin liquids  DVT Prophylaxis: Pneumatic Compression Devices  Suazo Catheter: Not present  Code Status: Full Code         Disposition:   Discussed patient care with his wife and bedside RN.  Will need TCU placement.  Stable for discharge.        Loni Yuen MD, MD  [phone](p)  [phone]( c)

## 2022-09-07 NOTE — PROGRESS NOTES
"[facility]  Palliative Care Daily Progress Note       Recommendations & Counseling       Goals of Care:     Goals of care discussion with  wife Roberta via phone. Goals are to continue current restorative cares and encourage Nomi to take in nutrition and liquids by mouth with goal of increased intake, she is pleased he is able to eat more at this time, appears appropriate to not pursue feeding tube at this time given his improvement with intake.    Roberta inquires about improvement of confusion noting he is \"not confused at home but seems to get confused every time he comes into the hospital.\" Consider a formal conative evaluation as outpatient for support of patient and family in presumed dementia.    Code status: Full Code; reviewed benefits and burdens of CPR with Roberta again, she is concerned about trauma during CPR but would like to keep current CODE STATUS for now.  Encouraged Roberta to think about the question not as \"If she would like him to live or die, but instead what would Chapito be willing to go through for the chance of more time.\"    Advanced Care Planning:  ? Patient has a completed Health Care Directive: Yes, and on file.     Delirium- resolving. Multifactorial, related to infection, medications, disomfort/pain, electrolyte/metabolic imbalances, environment.  -Maintain day/night routines and orientation.  -Avoid medications such as steroids, cannabinoids and anticholinergics.   -Optimize hydration/nutrition, and sleep.  -Utilize sensory aids to maintain orientation such as eye glasses, hearing aids or pocket talkers.  -Calm environment, quiet/reassuring voices, orienting cues, minimized noise/night disruptions, when possible.   -Maximize mobility and prevent/treat pain.     Dysphagia.   -Appreciate SLT expertise and recommendations  -Encourage patient to consume altered diet.     Pain, general body related to inactivity, musculoskeletal.  -Recommend acetaminophen 650 mg scheduled " and as needed not to exceed 4 g daily.  -Assist with transfers and repositioning as needed.  -Start titration back to chronic dose of gabapentin (600mg Q hs) as CrCl is at baseline.  Chapito takes this for neuropathy in feet.  Will increase conservatively given recent history of hypoactive delirium.  200mg at bedtime tonight.  Can increase by 100mg a day as tolerated.        At this time the patient does not have any needs we are actively addressing, and we are signing off.    However we know their condition may change -- please re-consult us if we can be helpful at any time in the future.     Thank you for the opportunity to be involved in the care of this patient.          Assessments          Hermilo Velasquez is a 89 year old male with PMH significant for Aortic stenosis s/p TAVR with bioprosthetic valve 2015, A-fib, DVT, COPD, AAA; h/o lung cancer s/p resection, non-Hodgkin's lymphoma s/p chemotherapy, peripheral neuropathy, GERD,  recent scheduled right total knee arthroplasty 7/25-31/2022 with discharge to TCU.Complications during last hospitalization include encephalopathy requiring patient sitter.   He discharged from TCU on 8/12/22. On 8/23 Hermilo was at clinic having routine labs and found to have a Hgb level of 5.7. He was sent to ED for evaluation. He is found to have acute blood loss on chronic anemia due to suspected GI bleed and has also been treated for hospital aquired PNA with possible aspiration.  Complications of delirium now resolving.     Today, the patient was seen for:   Goals of care and symptoms            Interval History:     Chart review/discussion with unit or clinical team members:   Patient's intake is improving with return to regular diet.  Hgb stable at 8.0, COmpleted course of Abx for PNA.    Per patient or family/caregivers today:  Roberta is pleased, Chapito is increasing intake and less confused.  She reports he has no confusion at home but gets it every time he comes into the  hospital.  She appropriately asks about his PNA, hgb and anticoagulation which she will follow up on restarting with primary care.  She is hopeful he can return home but understands recommendation is some TCU first.    Key Palliative Symptoms:  # Pain severity the last 12 hours: none  # Dyspnea severity the last 12 hours: none  # Nausea severity the last 12 hours: none  # Anxiety severity the last 12 hours: none               Review of Systems:     Besides above, an additional 2 system ROS was reviewed and is unremarkable          Medications:     I have reviewed this patient's medication profile and medications during this hospitalization.    Noted meds:  Gabapentin 100mg at bedtime, (Home dose 600mg Q HS)           Physical Exam:   Vitals were reviewed  Temp: 97.4  F (36.3  C) Temp src: Oral BP: 117/51 Pulse: 68   Resp: 18 SpO2: 95 % O2 Device: None (Room air) Oxygen Delivery: 1 LPM  GEN:  Alert, oriented x 1, appears comfortable, NAD.  HEENT:  Normocephalic/atraumatic, no scleral icterus, no nasal discharge, mouth moist.  CV:  Regular rate and rhythm, no murmur or JVD.  S1 + S2 noted, no S3 or S4.  LUNGS:  Clear to auscultation bilaterally without rales/rhonchi/wheezing/retractions.  Symmetric chest rise on inhalation noted.  ABD:  Active bowel sounds, soft, non-tender/non-distended.  No rebound/guarding/rigidity.  EXT:  No edema or cyanosis.   SKIN:  Dry to touch, no exanthems noted in the visualized areas.               Data Reviewed:     Reviewed recent pertinent imaging, comments:       Reviewed recent labs, comments:   Last Comprehensive Metabolic Panel:  Sodium   Date Value Ref Range Status   09/07/2022 141 133 - 144 mmol/L Final   04/15/2021 140 133 - 144 mmol/L Final     Potassium   Date Value Ref Range Status   09/07/2022 3.5 3.4 - 5.3 mmol/L Final   09/07/2022 3.5 3.4 - 5.3 mmol/L Final   04/15/2021 4.3 3.4 - 5.3 mmol/L Final     Chloride   Date Value Ref Range Status   09/07/2022 110 (H) 94 - 109  mmol/L Final   04/15/2021 112 (H) 94 - 109 mmol/L Final     Chloride (External) (External)   Date Value Ref Range Status   05/19/2022 105 98 - 109 mmol/L Final     Carbon Dioxide   Date Value Ref Range Status   04/15/2021 26 20 - 32 mmol/L Final     Carbon Dioxide (CO2)   Date Value Ref Range Status   09/07/2022 28 20 - 32 mmol/L Final     Anion Gap   Date Value Ref Range Status   09/07/2022 3 3 - 14 mmol/L Final   04/15/2021 2 (L) 3 - 14 mmol/L Final     Glucose   Date Value Ref Range Status   09/07/2022 86 70 - 99 mg/dL Final   04/15/2021 94 70 - 99 mg/dL Final     Urea Nitrogen   Date Value Ref Range Status   09/07/2022 16 7 - 30 mg/dL Final   04/15/2021 19 7 - 30 mg/dL Final     Creatinine   Date Value Ref Range Status   09/07/2022 0.82 0.66 - 1.25 mg/dL Final   04/15/2021 0.98 0.66 - 1.25 mg/dL Final     GFR Estimate   Date Value Ref Range Status   09/07/2022 84 >60 mL/min/1.73m2 Final     Comment:     Effective December 21, 2021 eGFRcr in adults is calculated using the 2021 CKD-EPI creatinine equation which includes age and gender (Christiano et al., NEJ, DOI: 10.1056/EPOPtn3478639)   04/15/2021 68 >60 mL/min/[1.73_m2] Final     Comment:     Non  GFR Calc  Starting 12/18/2018, serum creatinine based estimated GFR (eGFR) will be   calculated using the Chronic Kidney Disease Epidemiology Collaboration   (CKD-EPI) equation.       Calcium   Date Value Ref Range Status   09/07/2022 8.4 (L) 8.5 - 10.1 mg/dL Final   04/15/2021 7.8 (L) 8.5 - 10.1 mg/dL Final     CBC RESULTS: Recent Labs   Lab Test 09/07/22  0819 09/04/22  0848 09/02/22  1535   WBC  --   --  4.9   RBC  --   --  2.91*   HGB 8.0*   < > 8.5*   HCT  --   --  28.0*   MCV  --   --  96   MCH  --   --  29.2   MCHC  --   --  30.4*   RDW  --   --  15.9*   PLT  --   --  148*    < > = values in this interval not displayed.         TTS: I have personally spent a total of 40 minutes today reviewing patient's medical record, consultation with the medical  providers, assessing patient and symptoms and providing emotional support with more than 50% of this time spent in counseling, coordination of care  re: goals of care and symptom management.  10 minutes via phone with spouse Roberta  1130-1140AM, Face to face with Chapito [phone]    Deysi Robertson APRN, CNS, CNP  [facility], Palliative Care  [phone]

## 2022-09-07 NOTE — PLAN OF CARE
Pt alert to self but disoriented to time and situation. Pt was very pleasant and followed commands. Pt has urinal at bedside but was incontinent this shift. Pt is AX1 w/walker and gait belt. Pt has mepilex to coccyx and some irritation on groin. PIV is saline locked.Will continue to monitor.

## 2022-09-07 NOTE — PROGRESS NOTES
Care Management Follow Up    Length of Stay (days): 15    Expected Discharge Date: 09/09/2022     Concerns to be Addressed: discharge planning     Patient plan of care discussed at interdisciplinary rounds: Yes    Anticipated Discharge Disposition: Home, Home Care     Anticipated Discharge Services:    Anticipated Discharge DME:      Patient/family educated on Medicare website which has current facility and service quality ratings:    Education Provided on the Discharge Plan:    Patient/Family in Agreement with the Plan: yes    Referrals Placed by CM/SW:    Private pay costs discussed: Not applicable    Additional Information:  SW met with patient to discuss recommendation for TCU following hospital stay. Patient seemed to be in agreement and also in agreement with his wife being called to further discuss. Patient appeared slightly confused but pleasant. SW left a voicemail with patient's wife Roberta and asked for a call back.  SW did leave list of facilities in patient's room.    Addendum:  Roberta called LUIS back and discussed options for TCU.  Roberta said  [facility] was her only choice. LUIS sent referral to [facility] via DOD.  Roberta also said that they would like to transport patient to the TCU. [facility]IS will continue to follow.       SY Cobian

## 2022-09-08 ENCOUNTER — APPOINTMENT (OUTPATIENT)
Dept: SPEECH THERAPY | Facility: CLINIC | Age: 87
DRG: 377 | End: 2022-09-08
Payer: COMMERCIAL

## 2022-09-08 ENCOUNTER — TELEPHONE (OUTPATIENT)
Dept: GERIATRICS | Facility: CLINIC | Age: 87
End: 2022-09-08

## 2022-09-08 VITALS
TEMPERATURE: 98.4 F | RESPIRATION RATE: 14 BRPM | OXYGEN SATURATION: 97 % | HEIGHT: 68 IN | WEIGHT: 151.5 LBS | HEART RATE: 87 BPM | DIASTOLIC BLOOD PRESSURE: 66 MMHG | BODY MASS INDEX: 22.96 KG/M2 | SYSTOLIC BLOOD PRESSURE: 156 MMHG

## 2022-09-08 LAB
ANION GAP SERPL CALCULATED.3IONS-SCNC: 3 MMOL/L (ref 3–14)
BUN SERPL-MCNC: 16 MG/DL (ref 7–30)
CALCIUM SERPL-MCNC: 8.4 MG/DL (ref 8.5–10.1)
CHLORIDE BLD-SCNC: 110 MMOL/L (ref 94–109)
CO2 SERPL-SCNC: 27 MMOL/L (ref 20–32)
CREAT SERPL-MCNC: 0.79 MG/DL (ref 0.66–1.25)
GFR SERPL CREATININE-BSD FRML MDRD: 85 ML/MIN/1.73M2
GLUCOSE BLD-MCNC: 96 MG/DL (ref 70–99)
HGB BLD-MCNC: 8.6 G/DL (ref 13.3–17.7)
POTASSIUM BLD-SCNC: 3.9 MMOL/L (ref 3.4–5.3)
POTASSIUM BLD-SCNC: 3.9 MMOL/L (ref 3.4–5.3)
SODIUM SERPL-SCNC: 140 MMOL/L (ref 133–144)

## 2022-09-08 PROCEDURE — 250N000013 HC RX MED GY IP 250 OP 250 PS 637: Performed by: INTERNAL MEDICINE

## 2022-09-08 PROCEDURE — 84132 ASSAY OF SERUM POTASSIUM: CPT | Performed by: INTERNAL MEDICINE

## 2022-09-08 PROCEDURE — 99239 HOSP IP/OBS DSCHRG MGMT >30: CPT | Performed by: HOSPITALIST

## 2022-09-08 PROCEDURE — 92526 ORAL FUNCTION THERAPY: CPT | Mod: GN | Performed by: SPEECH-LANGUAGE PATHOLOGIST

## 2022-09-08 PROCEDURE — 82310 ASSAY OF CALCIUM: CPT | Performed by: HOSPITALIST

## 2022-09-08 PROCEDURE — 250N000013 HC RX MED GY IP 250 OP 250 PS 637: Performed by: REGISTERED NURSE

## 2022-09-08 PROCEDURE — 85018 HEMOGLOBIN: CPT | Performed by: HOSPITALIST

## 2022-09-08 PROCEDURE — 36415 COLL VENOUS BLD VENIPUNCTURE: CPT | Performed by: HOSPITALIST

## 2022-09-08 PROCEDURE — 250N000013 HC RX MED GY IP 250 OP 250 PS 637: Performed by: HOSPITALIST

## 2022-09-08 RX ORDER — OLANZAPINE 5 MG/1
5 TABLET, ORALLY DISINTEGRATING ORAL 2 TIMES DAILY PRN
DISCHARGE
Start: 2022-09-08 | End: 2022-09-15

## 2022-09-08 RX ORDER — METOPROLOL TARTRATE 25 MG/1
25 TABLET, FILM COATED ORAL 2 TIMES DAILY
DISCHARGE
Start: 2022-09-08 | End: 2022-12-29

## 2022-09-08 RX ORDER — GABAPENTIN 100 MG/1
200 CAPSULE ORAL AT BEDTIME
DISCHARGE
Start: 2022-09-08 | End: 2023-01-31

## 2022-09-08 RX ORDER — QUETIAPINE FUMARATE 25 MG/1
25 TABLET, FILM COATED ORAL EVERY 6 HOURS PRN
DISCHARGE
Start: 2022-09-08 | End: 2022-09-09

## 2022-09-08 RX ORDER — ONDANSETRON 2 MG/ML
4 INJECTION INTRAMUSCULAR; INTRAVENOUS
Status: DISCONTINUED | OUTPATIENT
Start: 2022-09-08 | End: 2022-09-08

## 2022-09-08 RX ORDER — LIDOCAINE 40 MG/G
CREAM TOPICAL
Status: DISCONTINUED | OUTPATIENT
Start: 2022-09-08 | End: 2022-09-08

## 2022-09-08 RX ORDER — BENZTROPINE MESYLATE 1 MG/1
1 TABLET ORAL 3 TIMES DAILY PRN
DISCHARGE
Start: 2022-09-08 | End: 2022-10-09

## 2022-09-08 RX ADMIN — UMECLIDINIUM 1 PUFF: 62.5 AEROSOL, POWDER ORAL at 08:06

## 2022-09-08 RX ADMIN — POLYETHYLENE GLYCOL 3350 17 G: 17 POWDER, FOR SOLUTION ORAL at 08:05

## 2022-09-08 RX ADMIN — ALFUZOSIN HYDROCHLORIDE 10 MG: 10 TABLET, EXTENDED RELEASE ORAL at 08:04

## 2022-09-08 RX ADMIN — FLUTICASONE FUROATE AND VILANTEROL TRIFENATATE 1 PUFF: 200; 25 POWDER RESPIRATORY (INHALATION) at 08:06

## 2022-09-08 RX ADMIN — ATORVASTATIN CALCIUM 10 MG: 10 TABLET, FILM COATED ORAL at 08:04

## 2022-09-08 RX ADMIN — FAMOTIDINE 20 MG: 20 TABLET ORAL at 08:04

## 2022-09-08 RX ADMIN — ACETAMINOPHEN 650 MG: 325 TABLET ORAL at 08:04

## 2022-09-08 RX ADMIN — MICONAZOLE NITRATE: 2 POWDER TOPICAL at 08:06

## 2022-09-08 ASSESSMENT — ACTIVITIES OF DAILY LIVING (ADL)
ADLS_ACUITY_SCORE: 40
ADLS_ACUITY_SCORE: 41
ADLS_ACUITY_SCORE: 40
ADLS_ACUITY_SCORE: 36
ADLS_ACUITY_SCORE: 40
ADLS_ACUITY_SCORE: 40
ADLS_ACUITY_SCORE: 36

## 2022-09-08 NOTE — PLAN OF CARE
Problem: Admitted 8/23 for suspected GI bleed initially, HA pneumonia  Hx: AFIB, recent TKA, cognitive impairment  Vitals: VSS on RA  Orientation/Neuro: A/O to self & place  Activity Level: A1 GBW  Diet: Thin liquids & easy to chew diet  GI: No BM this shift  Lung: LS diminished  : Inc of urine  Labs: K replacement protocol, K 3.5 this AM  IV Fluids/Drains/Tubes: PIV SL  Pain Management: Denies pain  Skin: Mepilex to buttock, redness to groin; Miconazole power applied  Plan: Discharge pending TCU placement

## 2022-09-08 NOTE — PROGRESS NOTES
Care Management Discharge Note    Discharge Date: 09/08/2022       Discharge Disposition: Transitional Care    Discharge Services: None    Discharge DME:      Discharge Transportation: family or friend will provide    Private pay costs discussed: transportation costs family will transport due to cost of MH transport     PAS Confirmation Code: [id number]  Patient/family educated on Medicare website which has current facility and service quality ratings:      Education Provided on the Discharge Plan:    Persons Notified of Discharge Plans: pt, [facility]'s [facility], bedside nurse  Patient/Family in Agreement with the Plan: yes    Handoff Referral Completed: Yes    Additional Information:  Pt to discharge at 1:30 to [facility]. Pt's wife and friend will meet pt at [address] for transport. SW updated bedside nurse of this plan. SW faxed orders and PAS to admissions. SW did not see any scripts.     PAS-RR    D: Per DHS regulation, SW completed and submitted PAS-RR to [place] [employer] via the Senior [employer] Line.  PAS-RR confirmation # is : [id number]    I: SW spoke with pt and wife and they are aware a PAS-RR has been submitted.  LUIS reviewed with pt and wife that they may be contacted for a follow up appointment within 10 days of hospital discharge if their SNF stay is < 30 days.  Contact information for [facility] LinkAge Line was also provided.    A: Pt and wife verbalized understanding.    P: Further questions may be directed to [facility] LinkAge Line at #[phone], option #4 for PAS-RR staff.          SY Trejo

## 2022-09-08 NOTE — PLAN OF CARE
DATE & TIME: 9/7/22, 1910 - 6267   Cognitive Concerns/ Orientation : A&O x 2, disoriented to time and situation  BEHAVIOR & AGGRESSION TOOL COLOR: Green   ABNL VS/O2: BP soft. O2 1 LPM given for SpO2 of 89% with improvements  MOBILITY: Assist x 1, GB and walker. Not out of bed this shift  PAIN MANAGMENT: Denied  DIET: Easy to chew,thin liquids  BOWEL/BLADDER: Incontinent of urine. No BM this shift  ABNL LAB/BG: NA  DRAIN/DEVICES: PIV SL  SKIN: Scattered bruises and scabs. Mild blanchable redness to coccyx.Redness to groin. Scheduled Miconazole powder available  TESTS/PROCEDURES: NA  D/C DATE: Pending TCU placement      Goal Outcome Evaluation:    Plan of Care Reviewed With: patient     Overall Patient Progress: no change

## 2022-09-08 NOTE — PLAN OF CARE
Pt discharged to [facility]. Transportation provided by wife. PIV removed. Send belongings & discharge paperwork with.     Spoke with Dr. Yuen and AVS was refaxed to [facility] with new orders with [facility] orders

## 2022-09-08 NOTE — DISCHARGE SUMMARY
[facility]    Discharge Summary  Hospitalist    Date of Admission:  8/23/2022  Date of Discharge:  9/8/2022    Discharge Diagnoses      Anemia, unspecified type  Gastrointestinal hemorrhage, unspecified gastrointestinal hemorrhage type  Lumbar post-laminectomy syndrome    History of Present Illness   Hermilo Velasquez is an 89 year old male who presented with weakness and falls with acute blood loss anemia    Hospital Course   Hermilo Velasquez was admitted on 8/23/2022.  The following problems were addressed during his hospitalization:    Hermilo Velasquez is a 89 year old male with hx of known colonic AVMs, chronic a-fib and recurrent PE on chronic AC with apixaban, aortic stenosis s/p TAVR, COPD, and cognitive impairment who was admitted on 8/23/2022 for acute anemia due to suspected GI bleed     Acute blood loss on chronic anemia due to suspected GI bleed  Hx colonic AVMs  * Referred to ED after routine blood work revealed a Hgb 5.8  * No obvious history of GI bleed however patient has history of AVMs in his colon which was treated with APC on 4/12/2022; also on on chronic AC with apixaban  * Transfused 1u pRBCs in the ED  * [facility] consulted on admission  - Received additional 1u pRBCs on 8/24  - Holding PTA apixaban. Would recommend holding for nw till colonoscopy can be completed   - Not on PPI due to allergy  - Continues on PTA famotidine 40 mg BID  - [facility] following,  EGD completed : no endoscopic abnormalities noted, colonoscopy deferred due to agitated delirium and hgb stability   -Hemoglobin slowly trending down.  Continue to monitor and transfuse as necessary for hemoglobin less than 7.  For now hold off on any further procedures until he is recovered with therapy.     Hospital-acquired pneumonia with possible aspiration  -Patient had a worsening mental status 8/26;  ABG did show some acidosis.  Chest x-ray showed bilateral new infiltrates concerning for pneumonia with  right greater than left.  Could have a component of aspiration.  Was started on IV cefepime and doxycycline.   Speech eval showed esophageal dysphagia.  Recommended one-to-one supervision with minced and moist food with mildly thick liquids.  - received ABX for 6 days, no fever, no leucocytosis, AMST recommended to stop ABX at this time  - will monitor clinically      Dysphagia  -Patient initially had significant dysphagia and was on minced and moist diet with thickened liquids.  He did not tolerate this well and kept having reduced intake with hypernatremia.  -VFSS was attempted but could not be completed due to his altered mental status on 8/31/2022  -Since then we have had discussions regarding tube feeds and decided to hold off on this and continue to work medically.  Patient improved significantly over the last 1 week with improved mentation to the point of returning to baseline.  Speech therapy continue to evaluate him and did liberalize his diet to thin liquids.  -Patient has had improved oral intake since thin liquids were started and his hypernatremia resolved without needing any further IV fluids.  -Continue with nutrition consult with 3-day calorie count.     Palliative care consult  -Goals of care discussion was had with his wife and daughter at bedside when patient was significantly confused.  At that time they wanted to continue with full code and aggressive measures.     Cognitive impairment  Delirium with metabolic encephalopathy  Lethargy- improved  * Patient lives with his wife who is his primary caregiver. Also has home care.  - patient had prior delirium episodes when he had surgery  - PT/OT consulted.  Recommended TCU placement due to ongoing weakness.  - sitter + restraints removed since his delirium resolved at this time.  - CT head 8/27- negative for acute pathology, noted brain atrophy and presumed chronic microvascular ischemic changes   - initially on Seroquel to 50 mg at bedtime and 25 mg  every 6 hours for agitation; Seroquel reduced to 25 mg at bedtime on 8/31 due to increased sleepiness during the day.  - 9/2- he remained lethargic, nonverbal today, minimal oral intake  - stopped scheduled Seroquel for now, has Seroquel 25 mg po q6 h prn and Zyprexa 5 mg BID prn available   - UA negative  - not getting any narcotics or benzo; PTA Gabapentin 600 mg at bedtime now decreased to 100 mg po qhs.  Patient did improve with his mentation after his Seroquel was discontinued and gabapentin was reduced.  Will slowly start tapering gabapentin. increased to 200 mg today.  - ordered ABG on 9/2 but not able to obtain due to the patient being restless   - suspected some baseline cognitive dysfunction.   - since Seroquel was stopped, lethargy improved, talks, confusion seems to be improving as well.     FRED on CKD stage III, resolved  * Creatinine up to 2.35 from 0.87 on 7/30/22, although creatinine has previously fluctuated in 1.6-1.9 range  * Suspected pre-renal state due to blood loss, borderline hypotension (noted on arrival), home torsemide, and recent use of Bactrim for UTI  * Improved with IV fluids and blood transfusion  - Acute renal failure resolved.  Creatinine back to normal at 1.00        Hypernatremia  - likely secondary to reduced oral intake.  Sodium continues to increase every time IV fluids are discontinued due to inadequate oral intake especially with thickened liquids.  Had extensive discussion with wife.  She does not have realistic expectations about outcomes with him and thinks that we need to be more aggressive with his cares.  For now she wants him full code and would want feeding tube done if he continues to have dysphagia.  - was off iv fluids Na 149--149  - refusing blood work at times, so difficult to assess his BMP  - BMP on 9/4- Na up to 152  - restarted 1/2NS at 75cc/h  - BMP in am- Na 146---143 .   -Stop IV fluids again on 9/6/2022 and recheck sodium in the morning.  Has had improved  oral intake since yesterday.  -Sodium remained within normal limits today despite not getting any IV fluids yesterday.  Oral intake improved.  Per natremia resolved at this time.     Chronic atrial fibrillation  Hx of pulmonary embolism  - PTA on Toprol XL 12.5 mg BID which had been held for the last few days because cannot be crushed? With dysphagia diet?  - /74, HR 90  - started Metoprolol 12.5 mg po BID-->increased to 25 mg po BID on 9/3  - Holding PTA apixaban in setting of suspected GI bleed  - Initiated discussion regarding risks/benefits of anticoagulation with patient's wife. Conversation with patient limited due to apparent cognitive impairment. Patients wife agrees on holding AC for the time being, but would like to resume conversation re: long term AC with patient's cardiologist, who he has an appointment with on Fri.  Wife called and unfortunately canceled this appointment.  He will not be getting another appointment for a while.  She might have to have conversation regarding anticoagulation with patient's primary care.     Hx aortic stenosis s/p TAVR  * TTE (8/22): EF 50-55%, unable to evaluate WMA due to limited study; mean AV gradient 14 mmHg  - Appears compensated without signs of volume overload  - PTA Torsemide on hold given poor oral intake     Peripheral neuropathy  - PTA gabapentin has been decreased from 600 to 300 mg qhs in setting of FRED--further decreased to 100 mg at night to see if this will improve his daytime sleepiness.-Diet slowly titrating back up to 200 mg at night.    Severe malnutrition  Recommendations Ordered by Registered Dietitian (RD):      Will discontinue the Magic Cup supplements per pt request (prefers regular ice cream)     Made pt Room Service with Assist - he will be seen daily for meal ordering, so he can select meal preferences (vs being sent standard meal trays)     Anticipate po intake will continue to improve with diet advancement and improving encephalopathy  - would hold off on tube feeding for now      Malnutrition:   % Weight Loss:  > 5% in 1 month (severe malnutrition) - 6% wt loss past month  % Intake:  </= 50% for >/= 5 days (severe malnutrition) - po intake has been decreased during this admit   Subcutaneous Fat Loss:  None observed  Muscle Loss:  Clavicle bone region - mild depletion  Fluid Retention:  None noted     Malnutrition Diagnosis: Severe malnutrition  In Context of:  Acute illness or injury             Otherwise chronic and stable medical conditions  Hx of right TKA on 7/25/2022.  Sutures removed.  COPD  Dyslipidemia  Hx of lung cancer s/p wedge resection (2019)  Hx of non-Hodgkin's Lymphoma, in remission  MGUS  BPH     Diet:   Moist diet with thin liquids  DVT Prophylaxis: Pneumatic Compression Devices  Suazo Catheter: Not present  Code Status: Full Code         Disposition:   Discussed patient care with his wife and bedside RN.  Will need TCU placement.  Stable for discharge.           Loni Yuen MD, MD  [phone](p)  [phone]( c)           Loni Yuen MD, MD    Code Status   Full Code       Primary Care Physician   Karson Bishop    Physical Exam   Temp: 98.4  F (36.9  C) Temp src: Axillary BP: (!) 156/66 Pulse: 87   Resp: 14 SpO2: 97 % O2 Device: None (Room air) Oxygen Delivery: 1 LPM  Vitals:    09/04/22 0600 09/05/22 0624 09/06/22 0629   Weight: 67.4 kg (148 lb 9.4 oz) 66.9 kg (147 lb 7.8 oz) 68.7 kg (151 lb 8 oz)     Vital Signs with Ranges  Temp:  [97.2  F (36.2  C)-98.4  F (36.9  C)] 98.4  F (36.9  C)  Pulse:  [77-89] 87  Resp:  [14-18] 14  BP: ()/(48-71) 156/66  SpO2:  [89 %-97 %] 97 %  I/O last 3 completed shifts:  In: 1290 [P.O.:1290]  Out: -     Physical Exam  Constitutional:       Appearance: Normal appearance.      Comments: Old and frail.     HENT:      Head: Normocephalic.   Eyes:      Pupils: Pupils are equal, round, and reactive to light.   Cardiovascular:      Rate and Rhythm: Normal rate and regular rhythm.       Pulses: Normal pulses.      Heart sounds: Normal heart sounds.   Pulmonary:      Effort: Pulmonary effort is normal. No respiratory distress.      Breath sounds: Normal breath sounds.   Abdominal:      General: Abdomen is flat. Bowel sounds are normal. There is no distension.      Tenderness: There is no abdominal tenderness. There is no guarding.   Musculoskeletal:         General: Normal range of motion.      Cervical back: Normal range of motion.   Skin:     General: Skin is warm and dry.   Neurological:      General: No focal deficit present.   Psychiatric:         Mood and Affect: Mood normal.           Discharge Disposition   Discharged to short-term care facility  Condition at discharge: Stable    Consultations This Hospital Stay   GASTROENTEROLOGY IP CONSULT  CARE MANAGEMENT / SOCIAL WORK IP CONSULT  PHYSICAL THERAPY ADULT IP CONSULT  OCCUPATIONAL THERAPY ADULT IP CONSULT  CARE MANAGEMENT / SOCIAL WORK IP CONSULT  SPEECH LANGUAGE PATH ADULT IP CONSULT  PHYSICAL THERAPY ADULT IP CONSULT  VASCULAR ACCESS ADULT IP CONSULT  PALLIATIVE CARE ADULT IP CONSULT  OCCUPATIONAL THERAPY ADULT IP CONSULT  NUTRITION SERVICES ADULT IP CONSULT  VASCULAR ACCESS ADULT IP CONSULT  PHYSICAL THERAPY ADULT IP CONSULT  OCCUPATIONAL THERAPY ADULT IP CONSULT    Time Spent on this Encounter   ILoni MD, personally saw the patient today and spent greater than 30 minutes discharging this patient.    Discharge Orders      Medication Therapy Management Referral      General info for SNF    Length of Stay Estimate: Short Term Care: Estimated # of Days <30  Condition at Discharge: Stable  Level of care:skilled   Rehabilitation Potential: Good  Admission H&P remains valid and up-to-date: Yes  Recent Chemotherapy: N/A  Use Nursing Home Standing Orders: Yes     Mantoux instructions    Give two-step Mantoux (PPD) Per Facility Policy Yes     Intake and output    Every shift     Follow Up and recommended labs and tests    Follow up  with Nursing home physician.  The following labs/tests are recommended: cbc, bmp in 1 week .     Activity - Up ad delmy     Reason for your hospital stay    Weakness and falls with dysphagia and aspiration     Physical Therapy Adult Consult    Evaluate and treat as clinically indicated.    Reason:  weakness     Occupational Therapy Adult Consult    Evaluate and treat as clinically indicated.    Reason:  weakness     Fall precautions     Diet    Follow this diet upon discharge: Orders Placed This Encounter      Calorie Counts      Room Service      Combination Diet Easy to Chew (level 7); Thin Liquids (level 0) (straws ok)       Discharge Medications   Discharge Medication List as of 9/8/2022  1:06 PM      START taking these medications    Details   benztropine (COGENTIN) 1 MG tablet Take 1 tablet (1 mg) by mouth 3 times daily as needed for other (for extrapyramidal effects), Transitional      metoprolol tartrate (LOPRESSOR) 25 MG tablet Take 1 tablet (25 mg) by mouth 2 times daily, Transitional      OLANZapine zydis (ZYPREXA) 5 MG ODT Take 1 tablet (5 mg) by mouth 2 times daily as needed, Transitional      QUEtiapine (SEROQUEL) 25 MG tablet Take 1 tablet (25 mg) by mouth every 6 hours as needed, Transitional         CONTINUE these medications which have CHANGED    Details   gabapentin (NEURONTIN) 100 MG capsule Take 2 capsules (200 mg) by mouth At Bedtime, Transitional         CONTINUE these medications which have NOT CHANGED    Details   acetaminophen (TYLENOL) 500 MG tablet Take 1,000 mg by mouth 3 times daily as needed, Historical      albuterol (PROAIR HFA/PROVENTIL HFA/VENTOLIN HFA) 108 (90 Base) MCG/ACT inhaler Inhale 2 puffs into the lungs every 6 hours as needed, Historical      albuterol (PROVENTIL) (2.5 MG/3ML) 0.083% neb solution Take 1 vial (2.5 mg) by nebulization every 6 hours as needed for shortness of breath / dyspnea or wheezing, Disp-180 mL, R-11, E-Prescribe      alfuzosin ER (UROXATRAL) 10 MG 24 hr  tablet Take 1 tablet (10 mg) by mouth daily, Disp-90 tablet, R-3, E-Prescribe      atorvastatin (LIPITOR) 10 MG tablet Take 1 tablet (10 mg) by mouth daily, Disp-90 tablet, R-2, E-Prescribe      dimenhyDRINATE (DRAMAMINE) 50 MG tablet Take 50 mg by mouth as needed, Historical      DULoxetine (CYMBALTA) 60 MG capsule Take 1 capsule daily for 7 days then take 2 capsules daily, Historical      famotidine (PEPCID) 40 MG tablet Take 40 mg by mouth 2 times daily, Historical      ferrous sulfate (FE TABS) 325 (65 Fe) MG EC tablet Take 325 mg by mouth 2 times daily, Historical      fluticasone-vilanterol (BREO ELLIPTA) 200-25 MCG/INH inhaler Inhale 1 puff into the lungs daily, Historical      metoprolol succinate ER (TOPROL XL) 25 MG 24 hr tablet Take 0.5 tablets (12.5 mg) by mouth 2 times daily ; Hold for SBP<100 or HR<55., Disp-30 tablet, R-3, E-Prescribe      nystatin (MYCOSTATIN) 740825 UNIT/GM external powder Apply to groin/abdomen folds topically two times a day for  redness until healed then D/CHistorical      ondansetron (ZOFRAN ODT) 4 MG ODT tab Take 1 tablet (4 mg) by mouth every 8 hours as needed for nausea Dissolve ON the tongue., Disp-10 tablet, R-0, Transitional      polyethylene glycol (MIRALAX) 17 g packet Take 17 g by mouth daily, Disp-7 packet, R-0, E-Prescribe      senna-docusate (SENOKOT-S/PERICOLACE) 8.6-50 MG tablet Take 1 tablet by mouth 2 times daily as needed for constipation Take while on oral narcotics to prevent or treat constipation., Disp-30 tablet, R-0, Transitional      tiotropium (SPIRIVA) 18 MCG inhaled capsule Inhale 18 mcg into the lungs daily, Historical      triamcinolone (KENALOG) 0.1 % external cream Apply topically 2 times daily as needed for irritationHistorical      ACE/ARB/ARNI NOT PRESCRIBED (INTENTIONAL) Reason ACE/ARB was Not Prescribed: Worsening renal function on ACE/ARB therapyNo Print Out                STOP taking these medications       torsemide (DEMADEX) 10 MG tablet  "Comments:   Reason for Stopping:        apixaban     Allergies   Allergies   Allergen Reactions     Omeprazole Itching     Pantoprazole Itching     Prevacid [Lansoprazole] Itching     Lasix [Furosemide] Rash     Lidocaine Blisters and Rash     Allergy to lidocaine ointment  Allergy to lidocaine ointment       Penicillin G Rash     Penicillins Rash     \"broke out from injection\" 60 yrs ago  Tolerates cephalosporins     Data   Recent Labs   Lab Test 09/08/22  0749 09/07/22  0819 09/06/22  0951 09/04/22  0848 09/02/22  1535 09/01/22  0746 08/31/22  0829 08/27/22  1139 08/27/22  0613 08/24/22  0411 08/23/22  1712 05/08/22  2336 05/08/22  1223 10/24/20  0037 10/23/20  1617   WBC  --   --   --   --  4.9  --  4.9  --  7.4   < > 8.5   < > 10.7   < > 8.0   HGB 8.6* 8.0* 8.1*   < > 8.5*   < > 8.5*  8.5*   < > 8.6*  8.6*   < > 5.3*   < > 11.2*   < > 6.6*   MCV  --   --   --   --  96  --  98  --  96   < > 97   < > 88   < > 89   PLT  --   --   --   --  148*  --  129*  --  188   < > 225   < > 244   < > 279   INR  --   --   --   --   --   --   --   --   --   --  1.65*  --  1.99*  --  1.39*    < > = values in this interval not displayed.      Recent Labs   Lab Test 09/08/22  0749 09/07/22  0819 09/06/22  0951    141 143   POTASSIUM 3.9  3.9 3.5  3.5 3.6   CHLORIDE 110* 110* 112*   CO2 27 28 27   BUN 16 16 17   CR 0.79 0.82 0.82   ANIONGAP 3 3 4   AMRITA 8.4* 8.4* 8.5   GLC 96 86 119*         Results for orders placed or performed during the hospital encounter of 08/23/22   CT Head w/o Contrast    Narrative    EXAM: CT HEAD W/O CONTRAST  LOCATION: [facility]  DATE/TIME: 8/27/2022 1:43 PM    INDICATION: Altered mental status. Confusion.  COMPARISON: 06/01/2022.  TECHNIQUE: Routine CT Head without IV contrast. Multiplanar reformats. Dose reduction techniques were used.    FINDINGS: Patient motion degrades evaluation.    INTRACRANIAL CONTENTS: No definite intracranial hemorrhage, extraaxial collection, " or mass effect.  No definite CT evidence of acute infarct. Mild presumed chronic small vessel ischemic changes. Mild to moderate generalized volume loss. No hydrocephalus.     Note is made of coarse atherosclerotic calcifications of the intracranial vasculature.     VISUALIZED ORBITS/SINUSES/MASTOIDS: Prior bilateral cataract surgery. Visualized portions of the orbits are otherwise unremarkable. Bilateral maxillary sinus mucosal thickening. No middle ear or mastoid effusion.    BONES/SOFT TISSUES: No definite acute abnormality.      Impression    IMPRESSION:    Motion degraded examination.    1.  No definite CT evidence for acute intracranial process.  2.  Brain atrophy and presumed chronic microvascular ischemic changes as above.   US Lower Extremity Venous Duplex Right    Narrative    EXAM: US LOWER EXTREMITY VENOUS DUPLEX RIGHT  LOCATION: [facility]  DATE/TIME: 8/27/2022 2:05 PM    INDICATION: 89-year-old patient with right knee surgery and pain, request made for evaluation of DVT  COMPARISON: None.  TECHNIQUE: Venous Duplex ultrasound of the right lower extremity with and without compression, augmentation and duplex. Color flow and spectral Doppler with waveform analysis performed.    FINDINGS: Exam includes the common femoral, femoral, popliteal, and contralateral common femoral veins as well as segmentally visualized deep calf veins and greater saphenous vein.     RIGHT: No deep vein thrombosis. No superficial thrombophlebitis. No popliteal cyst.      Impression    IMPRESSION:  1.  No deep venous thrombosis in the right lower extremity.   XR Chest 1 View    Narrative    EXAM: XR CHEST 1 VIEW  LOCATION: [facility]  DATE/TIME: 8/27/2022 1:35 PM    INDICATION: ?pneumonia  COMPARISON: 07/27/2022      Impression    IMPRESSION: Heart size is normal. Patchy opacities noted in both lungs, right more so than left, and new/progressed since previous exam. Findings  concerning for pneumonia, though asymmetric pulmonary edema or inflammatory process possible. No definite   pleural effusion or pneumothorax. Median sternotomy wires unchanged.   XR Video Swallow with SLP or OT    Narrative    VIDEO SWALLOWING EVALUATION   8/31/2022 10:05 AM     HISTORY: Dysphagia, assess pharyngeal phase    COMPARISON: None.    FLUOROSCOPY TIME: .9 minutes.  SPOT IMAGES OR CINE RUNS: 1    FINDINGS:    Mildly thick: No penetration or aspiration identified.     The patient was unable to complete the full anticipated exam. Refer to  speech pathology report for additional details.    RAI VILLEGAS MD         SYSTEM ID:  [id number]   XR Abdomen Port 1 View    Narrative    EXAM: XR ABDOMEN PORT 1 VIEW  LOCATION: [facility]  DATE/TIME: 9/3/2022 5:02 PM    INDICATION: Emesis; suspicion of small bowel obstruction.  COMPARISON: No prior radiographic imaging for comparison.      Impression    IMPRESSION: Coarse interstitial opacities in the lung bases. Median sternotomy wires present.    Bowel gas pattern is nonspecific, due to a paucity of large and small bowel gas. Consider CT imaging for further assessment.     *Note: Due to a large number of results and/or encounters for the requested time period, some results have not been displayed. A complete set of results can be found in Results Review.

## 2022-09-08 NOTE — PLAN OF CARE
"Occupational Therapy Discharge Summary    Reason for therapy discharge:    Discharged to transitional care facility.    Progress towards therapy goal(s). See goals on Care Plan in [facility] electronic health record for goal details.  Goals partially met.  Barriers to achieving goals:   discharge from facility.    Therapy recommendation(s):    Continued therapy is recommended.  Rationale/Recommendations:  continue IP OT in TCU setting.      \"pt continues to be below baseline. scored 6/30 on SLUMS. would benefit from TCU at discharge to allow time to work with OT on ADL/IADL and allow time for medical healing and hopefully improvement in cognition.\"  "

## 2022-09-08 NOTE — PLAN OF CARE
Speech Language Therapy Discharge Summary    Reason for therapy discharge:    Discharged to transitional care facility.    Progress towards therapy goal(s). See goals on Care Plan in [facility] electronic health record for goal details.  Goals not met.  Barriers to achieving goals:   discharge from facility.    Therapy recommendation(s):    Continued therapy is recommended.  Rationale/Recommendations:  Meal follow up with lunch. Pt alert and fully upright. Self feeding chopped chicken, carrots, mashed potatoes and thin liquids. Pt and RN reported extra time/effortful swallow. No overt s/sx of aspiration throughout the meal. Suspect chronic GERD based on pt's description of symptoms. Consider OP GI consult if pt wishes.

## 2022-09-08 NOTE — PROGRESS NOTES
Care Management Follow Up    Length of Stay (days): 16    Expected Discharge Date: 09/09/2022     Concerns to be Addressed: discharge planning     Patient plan of care discussed at interdisciplinary rounds: Yes    Anticipated Discharge Disposition: Transitional Care     Anticipated Discharge Services:    Anticipated Discharge DME:      Patient/family educated on Medicare website which has current facility and service quality ratings:    Education Provided on the Discharge Plan:    Patient/Family in Agreement with the Plan: yes    Referrals Placed by CM/SW:    Private pay costs discussed: Not applicable    Additional Information:  LUIS reviewed pt's DOD and saw that [facility] had accepted pt for placement. LUIS called Selina in [facility] admissions to confirm and was informed they believed pt would be accepted 9/9. LUIS explained they did not send the referral and it was noted in the provider note from yesterday that pt would be ready for discharge today. Selina reports they can admit pt today, but need confirmation from the provider. LUIS paged provider regarding discharge.    Provider approved discharge and has began work on discharge orders. LUIS called Selina and updated her. SW informed bedside nurse pt can discharge today. SW updated pt on discharge planning. Pt did not have any questions. LUIS called pt's wife Roberta and informed her of the acceptance for discharge today. Roberta was happy to hear about the placement and reports she had transport set up. LUIS asked when pt could be picked up. Roberta reports she had transport set up for tomorrow. LUIS inquired if they transport could come today. Roberta reports she would need to call her friend. LUIS inquired if they could set up medical transport through [facility]. Roberta reports that transport is too much money. LUIS will wait to hear from Roberta regarding transport. LUIS faxed discharge orders to [facility].           SY Trejo

## 2022-09-09 ENCOUNTER — PATIENT OUTREACH (OUTPATIENT)
Dept: CARE COORDINATION | Facility: CLINIC | Age: 87
End: 2022-09-09

## 2022-09-09 ENCOUNTER — TRANSITIONAL CARE UNIT VISIT (OUTPATIENT)
Dept: GERIATRICS | Facility: CLINIC | Age: 87
End: 2022-09-09
Payer: COMMERCIAL

## 2022-09-09 VITALS
BODY MASS INDEX: 23.9 KG/M2 | TEMPERATURE: 98.5 F | HEART RATE: 62 BPM | SYSTOLIC BLOOD PRESSURE: 100 MMHG | WEIGHT: 157.7 LBS | OXYGEN SATURATION: 92 % | HEIGHT: 68 IN | RESPIRATION RATE: 20 BRPM | DIASTOLIC BLOOD PRESSURE: 55 MMHG

## 2022-09-09 DIAGNOSIS — N18.30 STAGE 3 CHRONIC KIDNEY DISEASE, UNSPECIFIED WHETHER STAGE 3A OR 3B CKD (H): ICD-10-CM

## 2022-09-09 DIAGNOSIS — I10 ESSENTIAL HYPERTENSION WITH GOAL BLOOD PRESSURE LESS THAN 140/90: ICD-10-CM

## 2022-09-09 DIAGNOSIS — R41.0 DELIRIUM: ICD-10-CM

## 2022-09-09 DIAGNOSIS — R53.81 PHYSICAL DECONDITIONING: ICD-10-CM

## 2022-09-09 DIAGNOSIS — I48.0 PAROXYSMAL ATRIAL FIBRILLATION (H): ICD-10-CM

## 2022-09-09 DIAGNOSIS — D50.0 IRON DEFICIENCY ANEMIA DUE TO CHRONIC BLOOD LOSS: ICD-10-CM

## 2022-09-09 DIAGNOSIS — J15.9 HOSPITAL-ACQUIRED BACTERIAL PNEUMONIA: ICD-10-CM

## 2022-09-09 DIAGNOSIS — R13.10 DYSPHAGIA, UNSPECIFIED TYPE: ICD-10-CM

## 2022-09-09 DIAGNOSIS — K55.20 AVM (ARTERIOVENOUS MALFORMATION) OF COLON: Primary | ICD-10-CM

## 2022-09-09 DIAGNOSIS — J44.0 CHRONIC OBSTRUCTIVE PULMONARY DISEASE WITH ACUTE LOWER RESPIRATORY INFECTION (H): ICD-10-CM

## 2022-09-09 DIAGNOSIS — D62 ANEMIA DUE TO BLOOD LOSS, ACUTE: ICD-10-CM

## 2022-09-09 DIAGNOSIS — R41.89 COGNITIVE IMPAIRMENT: ICD-10-CM

## 2022-09-09 DIAGNOSIS — G62.9 PERIPHERAL POLYNEUROPATHY: ICD-10-CM

## 2022-09-09 PROCEDURE — 99310 SBSQ NF CARE HIGH MDM 45: CPT | Performed by: NURSE PRACTITIONER

## 2022-09-09 NOTE — LETTER
9/9/2022        RE: Hermilo Velasquez  [address]  [facility] [address]        [facility] GERIATRICS    PRIMARY CARE PROVIDER AND CLINIC:  Karson Bishop MD, [address] / [place] [address]  Chief Complaint   Patient presents with     Hospital F/U      [facility] Medical Record Number:  [id number]  Place of Service where encounter took place:  [facility]) [25]    Hermilo Velasquez  is a 89 year old  (11/3/1932), admitted to the above facility from  [facility]. Hospital stay 8/23/22 through 9/8/22..   HPI:    PMH of known colonic AVMs, chronic atrial fib, recurrent PE on chronic AC with apixaban, aortic stenosis s/p TAVR, COPD, cognitive impairement who admitted for acute anemia.   Acute blood loss on chronic anemia due to suspected GI bleed. Hx of colonic AVMs: Hgb 5.8 in ED,Hx of AVMs in colon which were Tx with APC on 4/12/22, on chronic AC with apixaban.   Transfused total of 2 units 8/23 and 8/24, GI consulted, EGD completed, no endoscopic abnormalities, colonoscopy deferred until OP due to extreme agitation and delirium. Hgb slowly trending down, transfuse for Hgb < 7, hold apixaban until colonoscopy completed, f/u as OP with GI for colonoscopy  HCAP: possible aspiration component to pneumonia, Finished Tx with Abx during hospitalization , Tx with cefepime and doxycycline  Dysphagia: change in mental status with agitation and delirium contributing to dysphagia, speech evaluated and patient was placed on minced and moist and thickened liquid, did not tolerate thickened liquids well, discussion regarding TF, deferred TF as mentation did improved with Tx of pneumonia. Unable to complete VFSS due to AMS, speech therapy did follow and were able to liberalize diet to thin liquids which patient is tolerating well.   Delirium/AMS: underlying cognitive impairment, patient placed on seroquel scheduled due to delirium, the seroquel caused  "oversedation and lethargy, able to wean seroquel to prn during hospitalization, has prn seroquel and zyprexa for agitation  FRED/CKD: creat up to 2.35 improved with IVF, baseline creat appears to be ~1   Atrial fib: on eliquis hx of PE, holding eliquis due to GI bleed, discussed risk vs. Benefit with patient wife, wife agrees to discuss with cardiology, did have an appt Friday but wife canceled, will need to get f/u appt and may need to discuss AC further   Peripheral neuropathy: PTA gabapentin decreased from 600mg to 100mg due to FRED and daytime sleepiness, titrated back up to 200mg qhs by discharge  On exam today: patient sitting up in w/c, alert, pleasant, very [place], denies pain or discomfort, denies abdominal discomfort, states he ate breakfast this morning, denies CP, palpitations, SOB, fever, chills, N/V/D or constipation    CODE STATUS/ADVANCE DIRECTIVES DISCUSSION:  Prior  CPR/Full code   ALLERGIES:   Allergies   Allergen Reactions     Omeprazole Itching     Pantoprazole Itching     Prevacid [Lansoprazole] Itching     Lasix [Furosemide] Rash     Lidocaine Blisters and Rash     Allergy to lidocaine ointment  Allergy to lidocaine ointment       Penicillin G Rash     Penicillins Rash     \"broke out from injection\" 60 yrs ago  Tolerates cephalosporins      PAST MEDICAL HISTORY:   Past Medical History:   Diagnosis Date     Adenocarcinoma, lung, 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 Vasques      Aortic stenosis     Severe AS, 9/2015 AVR - [facility] TRIFECTA Bovine bioprosthesis 25MM TF-25A     Atrial fibrillation (H)     9/2015 Paroxysmal post op Afib - discharged on Warfarin and a beta blocker     COPD (chronic obstructive pulmonary disease) (H)      Deep vein thrombosis (H)      GERD (gastroesophageal reflux disease)      Heart murmur      Monoclonal gammopathy     plasmacyte prominent causing monoclonal gammopathy     Need for SBE (subacute bacterial endocarditis) prophylaxis      Neuropathy      " Other and unspecified hyperlipidemia      Other malignant lymphomas     non hodgkin's lymphoma     RBBB (right bundle branch block)      Severe sepsis with acute organ dysfunction (H) 11/16/2015     Unspecified hereditary and idiopathic peripheral neuropathy       PAST SURGICAL HISTORY:   has a past surgical history that includes appendectomy; tonsillectomy; knee surgery; rotator cuff repair rt/lt; turp; hernia repair (2006); Spine surgery; Repair ligament ankle (2/23/2012); Herniorrhaphy ventral (4/17/2013); Replace valve aortic (N/A, 9/3/2015); Esophagoscopy, gastroscopy, duodenoscopy (EGD), combined (N/A, 11/28/2015); TOTAL KNEE ARTHROPLASTY; back surgery; Esophagoscopy, gastroscopy, duodenoscopy (EGD), combined (N/A, 7/26/2018); Thoracotomy, wedge resection lung, combined (Right, 3/26/2019); Lobectomy lung (Right, 3/26/2019); Esophagoscopy, gastroscopy, duodenoscopy (EGD), combined (N/A, 8/17/2020); Esophagoscopy, gastroscopy, duodenoscopy (EGD), combined (N/A, 10/24/2020); Esophagoscopy, gastroscopy, duodenoscopy (EGD), combined (N/A, 4/11/2022); Arthroplasty knee (Right, 7/25/2022); and Esophagoscopy, gastroscopy, duodenoscopy (EGD), combined (N/A, 8/26/2022).  FAMILY HISTORY: family history includes C.A.D. in his father; Emphysema in his father.  SOCIAL HISTORY:   reports that he quit smoking about 24 years ago. He has a 110.00 pack-year smoking history. He has never used smokeless tobacco. He reports previous alcohol use. He reports that he does not use drugs.  Patient's living condition: lives with spouse    Post Discharge Medication Reconciliation Status:     Post Medication Reconciliation Status: Discharge medications reconciled and changed, see notes/orders         Current Outpatient Medications   Medication Sig     ACE/ARB/ARNI NOT PRESCRIBED (INTENTIONAL) Please choose reason not prescribed from choices below. (Patient not taking: No sig reported)     acetaminophen (TYLENOL) 500 MG tablet Take 1,000 mg  by mouth 3 times daily as needed     albuterol (PROAIR HFA/PROVENTIL HFA/VENTOLIN HFA) 108 (90 Base) MCG/ACT inhaler Inhale 2 puffs into the lungs every 6 hours as needed     albuterol (PROVENTIL) (2.5 MG/3ML) 0.083% neb solution Take 1 vial (2.5 mg) by nebulization every 6 hours as needed for shortness of breath / dyspnea or wheezing     alfuzosin ER (UROXATRAL) 10 MG 24 hr tablet Take 1 tablet (10 mg) by mouth daily     atorvastatin (LIPITOR) 10 MG tablet Take 1 tablet (10 mg) by mouth daily     benztropine (COGENTIN) 1 MG tablet Take 1 tablet (1 mg) by mouth 3 times daily as needed for other (for extrapyramidal effects)     dimenhyDRINATE (DRAMAMINE) 50 MG tablet Take 50 mg by mouth as needed     DULoxetine (CYMBALTA) 60 MG capsule Take 1 capsule daily for 7 days then take 2 capsules daily     famotidine (PEPCID) 40 MG tablet Take 40 mg by mouth 2 times daily     ferrous sulfate (FE TABS) 325 (65 Fe) MG EC tablet Take 325 mg by mouth 2 times daily     fluticasone-vilanterol (BREO ELLIPTA) 200-25 MCG/INH inhaler Inhale 1 puff into the lungs daily     gabapentin (NEURONTIN) 100 MG capsule Take 2 capsules (200 mg) by mouth At Bedtime     metoprolol succinate ER (TOPROL XL) 25 MG 24 hr tablet Take 0.5 tablets (12.5 mg) by mouth 2 times daily ; Hold for SBP<100 or HR<55.     metoprolol tartrate (LOPRESSOR) 25 MG tablet Take 1 tablet (25 mg) by mouth 2 times daily     nystatin (MYCOSTATIN) 815004 UNIT/GM external powder Apply to groin/abdomen folds topically two times a day for  redness until healed then D/C     OLANZapine zydis (ZYPREXA) 5 MG ODT Take 1 tablet (5 mg) by mouth 2 times daily as needed     ondansetron (ZOFRAN ODT) 4 MG ODT tab Take 1 tablet (4 mg) by mouth every 8 hours as needed for nausea Dissolve ON the tongue.     polyethylene glycol (MIRALAX) 17 g packet Take 17 g by mouth daily     QUEtiapine (SEROQUEL) 25 MG tablet Take 1 tablet (25 mg) by mouth every 6 hours as needed     senna-docusate  "(SENOKOT-S/PERICOLACE) 8.6-50 MG tablet Take 1 tablet by mouth 2 times daily as needed for constipation Take while on oral narcotics to prevent or treat constipation.     tiotropium (SPIRIVA) 18 MCG inhaled capsule Inhale 18 mcg into the lungs daily     triamcinolone (KENALOG) 0.1 % external cream Apply topically 2 times daily as needed for irritation     No current facility-administered medications for this visit.       ROS:  10 point ROS of systems including Constitutional, Eyes, Respiratory, Cardiovascular, Gastroenterology, Genitourinary, Integumentary, Musculoskeletal, Psychiatric were all negative except for pertinent positives noted in my HPI.    Vitals:  /55   Pulse 62   Temp 98.5  F (36.9  C)   Resp 20   Ht 1.727 m (5' 8\")   Wt 71.5 kg (157 lb 11.2 oz)   SpO2 92%   BMI 23.98 kg/m    Exam:  GENERAL APPEARANCE:  Alert, in no distress  ENT:  Mouth and posterior oropharynx normal, moist mucous membranes, [place]  EYES:  EOM, conjunctivae, lids, pupils and irises normal, PERRL  RESP:  respiratory effort and palpation of chest normal, lungs clear to auscultation , no respiratory distress  CV:  Palpation and auscultation of heart done , regular rate and rhythm, no murmur, rub, or gallop, no edema  ABDOMEN:  normal bowel sounds, soft, nontender, no hepatosplenomegaly or other masses  M/S:   patient sitting up in w/c  SKIN:  Inspection of skin and subcutaneous tissue baseline  NEURO:   speech wnl  PSYCH:  affect and mood normal    Lab/Diagnostic data:  Recent labs in [facility] reviewed by me today.  and   Most Recent 3 CBC's:Recent Labs   Lab Test 09/08/22  0749 09/07/22  0819 09/06/22  0951 09/04/22  0848 09/02/22  1535 09/01/22  0746 08/31/22  0829 08/27/22  1139 08/27/22  0613   WBC  --   --   --   --  4.9  --  4.9  --  7.4   HGB 8.6* 8.0* 8.1*   < > 8.5*   < > 8.5*  8.5*   < > 8.6*  8.6*   MCV  --   --   --   --  96  --  98  --  96   PLT  --   --   --   --  148*  --  129*  --  188    < > = values in this " interval not displayed.     Most Recent 3 BMP's:Recent Labs   Lab Test 09/08/22  0749 09/07/22  0819 09/06/22  0951    141 143   POTASSIUM 3.9  3.9 3.5  3.5 3.6   CHLORIDE 110* 110* 112*   CO2 27 28 27   BUN 16 16 17   CR 0.79 0.82 0.82   ANIONGAP 3 3 4   AMRITA 8.4* 8.4* 8.5   GLC 96 86 119*       ASSESSMENT/PLAN:    (K55.20) AVM (arteriovenous malformation) of colon  (primary encounter diagnosis)  Comment: acute/ongoing  Hgb on admission to ED 5.8, received 2 units of PRBC's during hospitalization  Plan: continue famotidine 40mg BID (allergic to PPI)  F/u with GI as OP for colonoscopy  Holding apixiban until f/u with GI    (D62) Anemia due to blood loss, acute  (D50.0) Iron deficiency anemia due to chronic blood loss  Comment: acute/ongoing  Transfuse for Hgb < 7.0  Plan: Hgb Monday, ferrous sulfate 325mg BID    (R53.81) Physical deconditioning  Comment: acute/ongoing  Plan: PT and OT    (J15.9) Hospital-acquired bacterial pneumonia  Comment: resolved, aspiration component, Abx finished during hospitalization  Plan: monitor SaO2 at rest and with activity, keep SaO2> 90%    (R13.10) Dysphagia, unspecified type  Comment: acute/ongoing  SLP followed during hospitalization, video swallow unable to perform due to delirium/agitation  Plan: diet minced and moist with thin liquids, SLP to follow in TCU    (I10) Essential hypertension with goal blood pressure less than 140/90  (I48.0) Paroxysmal atrial fib    (N18.30) Stage 3 chronic kidney disease, unspecified whether stage 3a or 3b CKD (H)  Comment: acute/ongoing  Plan: holding apixaban due to GI bleed, BMP follow, continue metoprolol tartrate 25mg BID  F/u with cardiology, make appt    (J44.0) Chronic obstructive pulmonary disease with acute lower respiratory infection (H)  Comment: acute/ongoing  Plan: monitor SaO2 at rest and with activity, continue spiriva 18mcg QD, breo ellipta 200/25mcg QD, albuterol MDI 2 puffs q 6hours prn    (R41.89) Cognitive  impairment  (R41.0) Delirium  Comment: acute/ongoing  Plan: OT evaluation, DC seroquel prn, continue zyprexa zydis 5mg BID prn    (G62.9) Peripheral polyneuropathy  Comment: chronic ongoing  Plan: PTA dose 600mg qhs decreased to 200mg Qhs, monitor for pain or discomfort    Orders:  Hgb on Monday  CBC and BMP on wednesday  Continue to hold eliquis  DC seroquel  DC metoprolol xl  SLP to follow in TCU    Total time spent with patient visit at the skilled nursing facility was 45 min including patient visit and review of past records. Greater than 50% of total time spent with counseling and coordinating care due to discussed hospitalization with patient, multiple clarifications of meds and discussion of medication regimen with nursing.     Electronically signed by:  Tonya Lynn Haase, APRN CNP                       Sincerely,        Tonya Lynn Haase, APRN CNP

## 2022-09-09 NOTE — PLAN OF CARE
Physical Therapy Discharge Summary    Reason for therapy discharge:    Discharged to transitional care facility.    Progress towards therapy goal(s). See goals on Care Plan in [facility] electronic health record for goal details.  Goals partially met.  Barriers to achieving goals:   discharge from facility.    Therapy recommendation(s):    Continued therapy is recommended.  Rationale/Recommendations:  To progress functional mobility towards independence.

## 2022-09-09 NOTE — PROGRESS NOTES
[facility]    Background: Transitional Care Management program auto-identified and prompting a chart review by [facility] team.    Assessment: Upon chart review, [facility] Team member will cancel/close this episode of Transitional Care Management program due to reason below:    Patient discharged to TCU/ARU/LTACH and is established within [facility] Primary Care. Referral created for Primary Care-Care Coordination program.    Plan: Transitional Care Management episode closed per reason above.      Bina Limon  Community Health Worker  [facility], [facility]  Ph: [phone]    *Connected Care Resource Team does NOT follow patient ongoing. Referrals are identified based on internal discharge reports and the outreach is to ensure patient has an understanding of their discharge instructions.

## 2022-09-09 NOTE — PROGRESS NOTES
[facility] GERIATRICS    PRIMARY CARE PROVIDER AND CLINIC:  Karson Bishop MD, [address] / [place] [address]  Chief Complaint   Patient presents with     Hospital F/U      [facility] Medical Record Number:  [id number]  Place of Service where encounter took place:  [facility] (U) [25]    Hermilo Velasquez  is a 89 year old  (11/3/1932), admitted to the above facility from  [facility]. Hospital stay 8/23/22 through 9/8/22..   HPI:    PMH of known colonic AVMs, chronic atrial fib, recurrent PE on chronic AC with apixaban, aortic stenosis s/p TAVR, COPD, cognitive impairement who admitted for acute anemia.   Acute blood loss on chronic anemia due to suspected GI bleed. Hx of colonic AVMs: Hgb 5.8 in ED,Hx of AVMs in colon which were Tx with APC on 4/12/22, on chronic AC with apixaban.   Transfused total of 2 units 8/23 and 8/24, GI consulted, EGD completed, no endoscopic abnormalities, colonoscopy deferred until OP due to extreme agitation and delirium. Hgb slowly trending down, transfuse for Hgb < 7, hold apixaban until colonoscopy completed, f/u as OP with GI for colonoscopy  HCAP: possible aspiration component to pneumonia, Finished Tx with Abx during hospitalization , Tx with cefepime and doxycycline  Dysphagia: change in mental status with agitation and delirium contributing to dysphagia, speech evaluated and patient was placed on minced and moist and thickened liquid, did not tolerate thickened liquids well, discussion regarding TF, deferred TF as mentation did improved with Tx of pneumonia. Unable to complete VFSS due to AMS, speech therapy did follow and were able to liberalize diet to thin liquids which patient is tolerating well.   Delirium/AMS: underlying cognitive impairment, patient placed on seroquel scheduled due to delirium, the seroquel caused oversedation and lethargy, able to wean seroquel to prn during hospitalization, has prn seroquel and  "zyprexa for agitation  FRED/CKD: creat up to 2.35 improved with IVF, baseline creat appears to be ~1   Atrial fib: on eliquis hx of PE, holding eliquis due to GI bleed, discussed risk vs. Benefit with patient wife, wife agrees to discuss with cardiology, did have an appt Friday but wife canceled, will need to get f/u appt and may need to discuss AC further   Peripheral neuropathy: PTA gabapentin decreased from 600mg to 100mg due to FRED and daytime sleepiness, titrated back up to 200mg qhs by discharge  On exam today: patient sitting up in w/c, alert, pleasant, very [place], denies pain or discomfort, denies abdominal discomfort, states he ate breakfast this morning, denies CP, palpitations, SOB, fever, chills, N/V/D or constipation    CODE STATUS/ADVANCE DIRECTIVES DISCUSSION:  Prior  CPR/Full code   ALLERGIES:   Allergies   Allergen Reactions     Omeprazole Itching     Pantoprazole Itching     Prevacid [Lansoprazole] Itching     Lasix [Furosemide] Rash     Lidocaine Blisters and Rash     Allergy to lidocaine ointment  Allergy to lidocaine ointment       Penicillin G Rash     Penicillins Rash     \"broke out from injection\" 60 yrs ago  Tolerates cephalosporins      PAST MEDICAL HISTORY:   Past Medical History:   Diagnosis Date     Adenocarcinoma, lung, 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 Vasques      Aortic stenosis     Severe AS, 9/2015 AVR - [facility] TRIFECTA Bovine bioprosthesis 25MM TF-25A     Atrial fibrillation (H)     9/2015 Paroxysmal post op Afib - discharged on Warfarin and a beta blocker     COPD (chronic obstructive pulmonary disease) (H)      Deep vein thrombosis (H)      GERD (gastroesophageal reflux disease)      Heart murmur      Monoclonal gammopathy     plasmacyte prominent causing monoclonal gammopathy     Need for SBE (subacute bacterial endocarditis) prophylaxis      Neuropathy      Other and unspecified hyperlipidemia      Other malignant lymphomas     non hodgkin's lymphoma     " RBBB (right bundle branch block)      Severe sepsis with acute organ dysfunction (H) 11/16/2015     Unspecified hereditary and idiopathic peripheral neuropathy       PAST SURGICAL HISTORY:   has a past surgical history that includes appendectomy; tonsillectomy; knee surgery; rotator cuff repair rt/lt; turp; hernia repair (2006); Spine surgery; Repair ligament ankle (2/23/2012); Herniorrhaphy ventral (4/17/2013); Replace valve aortic (N/A, 9/3/2015); Esophagoscopy, gastroscopy, duodenoscopy (EGD), combined (N/A, 11/28/2015); TOTAL KNEE ARTHROPLASTY; back surgery; Esophagoscopy, gastroscopy, duodenoscopy (EGD), combined (N/A, 7/26/2018); Thoracotomy, wedge resection lung, combined (Right, 3/26/2019); Lobectomy lung (Right, 3/26/2019); Esophagoscopy, gastroscopy, duodenoscopy (EGD), combined (N/A, 8/17/2020); Esophagoscopy, gastroscopy, duodenoscopy (EGD), combined (N/A, 10/24/2020); Esophagoscopy, gastroscopy, duodenoscopy (EGD), combined (N/A, 4/11/2022); Arthroplasty knee (Right, 7/25/2022); and Esophagoscopy, gastroscopy, duodenoscopy (EGD), combined (N/A, 8/26/2022).  FAMILY HISTORY: family history includes C.A.D. in his father; Emphysema in his father.  SOCIAL HISTORY:   reports that he quit smoking about 24 years ago. He has a 110.00 pack-year smoking history. He has never used smokeless tobacco. He reports previous alcohol use. He reports that he does not use drugs.  Patient's living condition: lives with spouse    Post Discharge Medication Reconciliation Status:     Post Medication Reconciliation Status: Discharge medications reconciled and changed, see notes/orders         Current Outpatient Medications   Medication Sig     ACE/ARB/ARNI NOT PRESCRIBED (INTENTIONAL) Please choose reason not prescribed from choices below. (Patient not taking: No sig reported)     acetaminophen (TYLENOL) 500 MG tablet Take 1,000 mg by mouth 3 times daily as needed     albuterol (PROAIR HFA/PROVENTIL HFA/VENTOLIN HFA) 108 (90  Base) MCG/ACT inhaler Inhale 2 puffs into the lungs every 6 hours as needed     albuterol (PROVENTIL) (2.5 MG/3ML) 0.083% neb solution Take 1 vial (2.5 mg) by nebulization every 6 hours as needed for shortness of breath / dyspnea or wheezing     alfuzosin ER (UROXATRAL) 10 MG 24 hr tablet Take 1 tablet (10 mg) by mouth daily     atorvastatin (LIPITOR) 10 MG tablet Take 1 tablet (10 mg) by mouth daily     benztropine (COGENTIN) 1 MG tablet Take 1 tablet (1 mg) by mouth 3 times daily as needed for other (for extrapyramidal effects)     dimenhyDRINATE (DRAMAMINE) 50 MG tablet Take 50 mg by mouth as needed     DULoxetine (CYMBALTA) 60 MG capsule Take 1 capsule daily for 7 days then take 2 capsules daily     famotidine (PEPCID) 40 MG tablet Take 40 mg by mouth 2 times daily     ferrous sulfate (FE TABS) 325 (65 Fe) MG EC tablet Take 325 mg by mouth 2 times daily     fluticasone-vilanterol (BREO ELLIPTA) 200-25 MCG/INH inhaler Inhale 1 puff into the lungs daily     gabapentin (NEURONTIN) 100 MG capsule Take 2 capsules (200 mg) by mouth At Bedtime     metoprolol succinate ER (TOPROL XL) 25 MG 24 hr tablet Take 0.5 tablets (12.5 mg) by mouth 2 times daily ; Hold for SBP<100 or HR<55.     metoprolol tartrate (LOPRESSOR) 25 MG tablet Take 1 tablet (25 mg) by mouth 2 times daily     nystatin (MYCOSTATIN) 661256 UNIT/GM external powder Apply to groin/abdomen folds topically two times a day for  redness until healed then D/C     OLANZapine zydis (ZYPREXA) 5 MG ODT Take 1 tablet (5 mg) by mouth 2 times daily as needed     ondansetron (ZOFRAN ODT) 4 MG ODT tab Take 1 tablet (4 mg) by mouth every 8 hours as needed for nausea Dissolve ON the tongue.     polyethylene glycol (MIRALAX) 17 g packet Take 17 g by mouth daily     QUEtiapine (SEROQUEL) 25 MG tablet Take 1 tablet (25 mg) by mouth every 6 hours as needed     senna-docusate (SENOKOT-S/PERICOLACE) 8.6-50 MG tablet Take 1 tablet by mouth 2 times daily as needed for constipation  "Take while on oral narcotics to prevent or treat constipation.     tiotropium (SPIRIVA) 18 MCG inhaled capsule Inhale 18 mcg into the lungs daily     triamcinolone (KENALOG) 0.1 % external cream Apply topically 2 times daily as needed for irritation     No current facility-administered medications for this visit.       ROS:  10 point ROS of systems including Constitutional, Eyes, Respiratory, Cardiovascular, Gastroenterology, Genitourinary, Integumentary, Musculoskeletal, Psychiatric were all negative except for pertinent positives noted in my HPI.    Vitals:  /55   Pulse 62   Temp 98.5  F (36.9  C)   Resp 20   Ht 1.727 m (5' 8\")   Wt 71.5 kg (157 lb 11.2 oz)   SpO2 92%   BMI 23.98 kg/m    Exam:  GENERAL APPEARANCE:  Alert, in no distress  ENT:  Mouth and posterior oropharynx normal, moist mucous membranes, [place]  EYES:  EOM, conjunctivae, lids, pupils and irises normal, PERRL  RESP:  respiratory effort and palpation of chest normal, lungs clear to auscultation , no respiratory distress  CV:  Palpation and auscultation of heart done , regular rate and rhythm, no murmur, rub, or gallop, no edema  ABDOMEN:  normal bowel sounds, soft, nontender, no hepatosplenomegaly or other masses  M/S:   patient sitting up in w/c  SKIN:  Inspection of skin and subcutaneous tissue baseline  NEURO:   speech wnl  PSYCH:  affect and mood normal    Lab/Diagnostic data:  Recent labs in [facility] reviewed by me today.  and   Most Recent 3 CBC's:Recent Labs   Lab Test 09/08/22  0749 09/07/22  0819 09/06/22  0951 09/04/22  0848 09/02/22  1535 09/01/22  0746 08/31/22  0829 08/27/22  1139 08/27/22  0613   WBC  --   --   --   --  4.9  --  4.9  --  7.4   HGB 8.6* 8.0* 8.1*   < > 8.5*   < > 8.5*  8.5*   < > 8.6*  8.6*   MCV  --   --   --   --  96  --  98  --  96   PLT  --   --   --   --  148*  --  129*  --  188    < > = values in this interval not displayed.     Most Recent 3 BMP's:Recent Labs   Lab Test 09/08/22  0749 09/07/22  0819 " 09/06/22  0951    141 143   POTASSIUM 3.9  3.9 3.5  3.5 3.6   CHLORIDE 110* 110* 112*   CO2 27 28 27   BUN 16 16 17   CR 0.79 0.82 0.82   ANIONGAP 3 3 4   AMRITA 8.4* 8.4* 8.5   GLC 96 86 119*       ASSESSMENT/PLAN:    (K55.20) AVM (arteriovenous malformation) of colon  (primary encounter diagnosis)  Comment: acute/ongoing  Hgb on admission to ED 5.8, received 2 units of PRBC's during hospitalization  Plan: continue famotidine 40mg BID (allergic to PPI)  F/u with GI as OP for colonoscopy  Holding apixiban until f/u with GI    (D62) Anemia due to blood loss, acute  (D50.0) Iron deficiency anemia due to chronic blood loss  Comment: acute/ongoing  Transfuse for Hgb < 7.0  Plan: Hgb Monday, ferrous sulfate 325mg BID    (R53.81) Physical deconditioning  Comment: acute/ongoing  Plan: PT and OT    (J15.9) Hospital-acquired bacterial pneumonia  Comment: resolved, aspiration component, Abx finished during hospitalization  Plan: monitor SaO2 at rest and with activity, keep SaO2> 90%    (R13.10) Dysphagia, unspecified type  Comment: acute/ongoing  SLP followed during hospitalization, video swallow unable to perform due to delirium/agitation  Plan: diet minced and moist with thin liquids, SLP to follow in TCU    (I10) Essential hypertension with goal blood pressure less than 140/90  (I48.0) Paroxysmal atrial fib    (N18.30) Stage 3 chronic kidney disease, unspecified whether stage 3a or 3b CKD (H)  Comment: acute/ongoing  Plan: holding apixaban due to GI bleed, BMP follow, continue metoprolol tartrate 25mg BID  F/u with cardiology, make appt    (J44.0) Chronic obstructive pulmonary disease with acute lower respiratory infection (H)  Comment: acute/ongoing  Plan: monitor SaO2 at rest and with activity, continue spiriva 18mcg QD, breo ellipta 200/25mcg QD, albuterol MDI 2 puffs q 6hours prn    (R41.89) Cognitive impairment  (R41.0) Delirium  Comment: acute/ongoing  Plan: OT evaluation, DC seroquel prn, continue zyprexa zydis  5mg BID prn    (G62.9) Peripheral polyneuropathy  Comment: chronic ongoing  Plan: PTA dose 600mg qhs decreased to 200mg Qhs, monitor for pain or discomfort    Orders:  Hgb on Monday  CBC and BMP on wednesday  Continue to hold eliquis  PATTY seroquel  PATTY metoprolol xl  SLP to follow in TCU    Total time spent with patient visit at the skilled nursing facility was 45 min including patient visit and review of past records. Greater than 50% of total time spent with counseling and coordinating care due to discussed hospitalization with patient, multiple clarifications of meds and discussion of medication regimen with nursing.     Electronically signed by:  Tonya Lynn Haase, APRN CNP

## 2022-09-09 NOTE — TELEPHONE ENCOUNTER
Called on new admit for paramters for metoprolol. SBPs at TCU in 90s. No symptoms. They don't have a baseline for him.     Order given to hold for SBP <110.     Evi Monge MD

## 2022-09-12 ENCOUNTER — TRANSITIONAL CARE UNIT VISIT (OUTPATIENT)
Dept: GERIATRICS | Facility: CLINIC | Age: 87
End: 2022-09-12
Payer: COMMERCIAL

## 2022-09-12 ENCOUNTER — PATIENT OUTREACH (OUTPATIENT)
Dept: CARE COORDINATION | Facility: CLINIC | Age: 87
End: 2022-09-12

## 2022-09-12 VITALS
WEIGHT: 157 LBS | SYSTOLIC BLOOD PRESSURE: 121 MMHG | HEIGHT: 68 IN | TEMPERATURE: 97.6 F | HEART RATE: 89 BPM | OXYGEN SATURATION: 91 % | DIASTOLIC BLOOD PRESSURE: 53 MMHG | RESPIRATION RATE: 16 BRPM | BODY MASS INDEX: 23.79 KG/M2

## 2022-09-12 DIAGNOSIS — N18.30 STAGE 3 CHRONIC KIDNEY DISEASE, UNSPECIFIED WHETHER STAGE 3A OR 3B CKD (H): ICD-10-CM

## 2022-09-12 DIAGNOSIS — R53.81 PHYSICAL DECONDITIONING: ICD-10-CM

## 2022-09-12 DIAGNOSIS — K55.20 AVM (ARTERIOVENOUS MALFORMATION) OF COLON: Primary | ICD-10-CM

## 2022-09-12 DIAGNOSIS — R41.89 COGNITIVE IMPAIRMENT: ICD-10-CM

## 2022-09-12 DIAGNOSIS — I10 ESSENTIAL HYPERTENSION WITH GOAL BLOOD PRESSURE LESS THAN 140/90: ICD-10-CM

## 2022-09-12 DIAGNOSIS — R13.10 DYSPHAGIA, UNSPECIFIED TYPE: ICD-10-CM

## 2022-09-12 DIAGNOSIS — I48.0 PAROXYSMAL ATRIAL FIBRILLATION (H): ICD-10-CM

## 2022-09-12 DIAGNOSIS — E44.0 MODERATE PROTEIN-CALORIE MALNUTRITION (H): ICD-10-CM

## 2022-09-12 DIAGNOSIS — D50.0 IRON DEFICIENCY ANEMIA DUE TO CHRONIC BLOOD LOSS: ICD-10-CM

## 2022-09-12 DIAGNOSIS — J44.0 CHRONIC OBSTRUCTIVE PULMONARY DISEASE WITH ACUTE LOWER RESPIRATORY INFECTION (H): ICD-10-CM

## 2022-09-12 DIAGNOSIS — D62 ANEMIA DUE TO BLOOD LOSS, ACUTE: ICD-10-CM

## 2022-09-12 PROCEDURE — 99310 SBSQ NF CARE HIGH MDM 45: CPT | Performed by: NURSE PRACTITIONER

## 2022-09-12 NOTE — PROGRESS NOTES
"[facility] GERIATRICS    Chief Complaint   Patient presents with     RECHECK     HPI:  Hermilo Velasquez is a 89 year old  (11/3/1932), who is being seen today for an episodic care visit at: [facility]) [25]. Today's concern is:   AVM/anemia: see labs, denies pain or discomfort, no signs of bleeding  Deconditioning: In therapy patient is walking up to 165 feet using a RW with CGA  Malnutrition: weight 153.5lbs 5/14/22 weight was 161.9lbs, patient only eating 50% of meals.   Dysphagia: ST following, upgraded to regular diet with thin liquids  HTN/PAF: /53, 108/60, 115/62 with HR 70-80 range, denies CP, palpitations  COPD: denies SOB, SaO2 90-91% on room air  Cognitive impairement: BIMS 12/15, SBT 10/28    Allergies, and PMH/PSH reviewed in [facility] today.  REVIEW OF SYSTEMS:  10 point ROS of systems including Constitutional, Eyes, Respiratory, Cardiovascular, Gastroenterology, Genitourinary, Integumentary, Musculoskeletal, Psychiatric were all negative except for pertinent positives noted in my HPI.    Objective:   /53   Pulse 89   Temp 97.6  F (36.4  C)   Resp 16   Ht 1.727 m (5' 8\")   Wt 71.2 kg (157 lb)   SpO2 91%   BMI 23.87 kg/m    GENERAL APPEARANCE:  Alert, in no distress  ENT:  Mouth and posterior oropharynx normal, moist mucous membranes, [place]  EYES:  EOM, conjunctivae, lids, pupils and irises normal  RESP:  respiratory effort and palpation of chest normal, lungs clear to auscultation , no respiratory distress  CV:  Palpation and auscultation of heart done , regular rate and rhythm, no murmur, rub, or gallop, peripheral edema trace+ in LE bilaterally  ABDOMEN:  normal bowel sounds, soft, nontender, no hepatosplenomegaly or other masses  M/S:   patient sitting up in w/c  SKIN:  Inspection of skin and subcutaneous tissue baseline  NEURO:   speech wnl  PSYCH:  affect and mood normal    Recent labs in [facility] reviewed by me today.  and   Most Recent 3 CBC's:  Recent Labs   Lab Test " 09/08/22  0749 09/07/22  0819 09/06/22  0951 09/04/22  0848 09/02/22  1535 09/01/22  0746 08/31/22  0829 08/27/22  1139 08/27/22  0613   WBC  --   --   --   --  4.9  --  4.9  --  7.4   HGB 8.6* 8.0* 8.1*   < > 8.5*   < > 8.5*  8.5*   < > 8.6*  8.6*   MCV  --   --   --   --  96  --  98  --  96   PLT  --   --   --   --  148*  --  129*  --  188    < > = values in this interval not displayed.     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  (primary encounter diagnosis)  Comment: acute/ongoing, no change  Hgb on admission to ED 5.8, received 2 units of PRBC's during hospitalization  Plan: continue famotidine 40mg BID (allergic to PPI)  F/u with GI as OP for colonoscopy  Holding apixiban until f/u with GI  Hgb pending for today     (D62) Anemia due to blood loss, acute  (D50.0) Iron deficiency anemia due to chronic blood loss  Comment: acute/ongoing  Transfuse for Hgb < 7.0  Plan: Hgb pending today, ferrous sulfate 325mg BID     (R53.81) Physical deconditioning  Comment: acute/ongoing  Plan: PT and OT    (E44.0) moderate protein calorie malnutrition  Acute/ongoing  Plan: dietician to follow, SLP upgraded diet to regular textures and continue thin liquids,       (R13.10) Dysphagia, unspecified type  Comment: acute/ongoing  SLP followed during hospitalization, video swallow unable to perform due to delirium/agitation  Plan: diet minced and moist with thin liquids, upgraded to regular textures with thin liquids in TCU, SLP to follow in TCU     (I10) Essential hypertension with goal blood pressure less than 140/90  (I48.0) Paroxysmal atrial fib    (N18.30) Stage 3 chronic kidney disease, unspecified whether stage 3a or 3b  CKD (H)  Comment: acute/ongoing, no change  Plan: holding apixaban due to GI bleed, BMP follow, continue metoprolol tartrate 25mg BID  F/u with cardiology, make appt     (J44.0) Chronic obstructive pulmonary disease with acute lower respiratory infection (H)  Comment: acute/ongoing, no change  Plan: monitor SaO2 at rest and with activity, continue spiriva 18mcg QD, breo ellipta 200/25mcg QD, albuterol MDI 2 puffs q 6hours prn     (R41.89) Cognitive impairment  (R41.0) Delirium  Comment: acute/ongoing, no change  BIMS 12/15 and SBT 10/28  Plan: OT evaluation, DC seroquel   prn, continue zyprexa zydis 5mg BID prn     (G62.9) Peripheral polyneuropathy  Comment: chronic ongoing  Plan: PTA dose 600mg qhs decreased to 200mg Qhs, monitor for pain or discomfort      Post Medication Reconciliation Status: Discharge medications reconciled, continue medications without change        Orders:  No new orders      Electronically signed by: Tonya Lynn Haase, APRN CNP

## 2022-09-12 NOTE — LETTER
[facility]   To:   [facility]          Please give to facility    From:   LUIS Hayes Care Coordinator [facility]     Patient Name:  Hermilo Velasquez  YOB: 1932    Admit date: 9/8/2022      *Information Needed:  Please contact me when the patient will discharge (or if they will move to long term care)- include the discharge date, disposition, and main diagnosis   - If the patient is discharged with home care services, please provide the name of the agency    Also- Please inform me if a care conference is being held.   Phone, Fax or Email with information       Thank You,   JERMAINE Hayes, MSW  Clinic Care Coordinator  [facility] [place], [place], and [place]  Ph: [phone]  [email or web address]

## 2022-09-12 NOTE — PROGRESS NOTES
Clinic Care Coordination Contact  Care Coordination Transition Communication         Clinical Data: [facility]     Discharge Summary  Hospitalist     Date of Admission:  8/23/2022  Date of Discharge:  9/8/2022        Discharge Diagnoses        Anemia, unspecified type  Gastrointestinal hemorrhage, unspecified gastrointestinal hemorrhage type  Lumbar post-laminectomy syndrome    Transition to Facility:              Facility Name: [facility] [place]              Contact name and phone number/fax: [phone]/ [phone]    Plan: RN/SW Care Coordinator will await notification from facility staff informing RN/SW Care Coordinator of patient's discharge plans/needs. RN/SW Care Coordinator will review chart and outreach to facility staff every 4 weeks and as needed. Fax sent to TCU with my contact information requesting notification upon discharge.    Ana Johnston [facility]  Clinic Care Coordinator  [facility]  [phone]  [email or web address]

## 2022-09-12 NOTE — LETTER
"    9/12/2022        RE: Hermilo Velasquez  [address]  [facility] [address]        [facility]    Chief Complaint   Patient presents with     RECHECK     HPI:  Hermilo Velasquez is a 89 year old  (11/3/1932), who is being seen today for an episodic care visit at: [facility]) [25]. Today's concern is:   AVM/anemia: see labs, denies pain or discomfort, no signs of bleeding  Deconditioning: In therapy patient is walking up to 165 feet using a RW with CGA  Malnutrition: weight 153.5lbs 5/14/22 weight was 161.9lbs, patient only eating 50% of meals.   Dysphagia: ST following, upgraded to regular diet with thin liquids  HTN/PAF: /53, 108/60, 115/62 with HR 70-80 range, denies CP, palpitations  COPD: denies SOB, SaO2 90-91% on room air  Cognitive impairement: BIMS 12/15, SBT 10/28    Allergies, and PMH/PSH reviewed in [facility] today.  REVIEW OF SYSTEMS:  10 point ROS of systems including Constitutional, Eyes, Respiratory, Cardiovascular, Gastroenterology, Genitourinary, Integumentary, Musculoskeletal, Psychiatric were all negative except for pertinent positives noted in my HPI.    Objective:   /53   Pulse 89   Temp 97.6  F (36.4  C)   Resp 16   Ht 1.727 m (5' 8\")   Wt 71.2 kg (157 lb)   SpO2 91%   BMI 23.87 kg/m    GENERAL APPEARANCE:  Alert, in no distress  ENT:  Mouth and posterior oropharynx normal, moist mucous membranes, [place]  EYES:  EOM, conjunctivae, lids, pupils and irises normal  RESP:  respiratory effort and palpation of chest normal, lungs clear to auscultation , no respiratory distress  CV:  Palpation and auscultation of heart done , regular rate and rhythm, no murmur, rub, or gallop, peripheral edema trace+ in LE bilaterally  ABDOMEN:  normal bowel sounds, soft, nontender, no hepatosplenomegaly or other masses  M/S:   patient sitting up in w/c  SKIN:  Inspection of skin and subcutaneous tissue baseline  NEURO:   speech wnl  PSYCH:  affect and mood " normal    Recent labs in [facility] reviewed by me today.  and   Most Recent 3 CBC's:  Recent Labs   Lab Test 09/08/22  0749 09/07/22  0819 09/06/22  0951 09/04/22  0848 09/02/22  1535 09/01/22  0746 08/31/22  0829 08/27/22  1139 08/27/22  0613   WBC  --   --   --   --  4.9  --  4.9  --  7.4   HGB 8.6* 8.0* 8.1*   < > 8.5*   < > 8.5*  8.5*   < > 8.6*  8.6*   MCV  --   --   --   --  96  --  98  --  96   PLT  --   --   --   --  148*  --  129*  --  188    < > = values in this interval not displayed.     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  (primary encounter diagnosis)  Comment: acute/ongoing, no change  Hgb on admission to ED 5.8, received 2 units of PRBC's during hospitalization  Plan: continue famotidine 40mg BID (allergic to PPI)  F/u with GI as OP for colonoscopy  Holding apixiban until f/u with GI  Hgb pending for today     (D62) Anemia due to blood loss, acute  (D50.0) Iron deficiency anemia due to chronic blood loss  Comment: acute/ongoing  Transfuse for Hgb < 7.0  Plan: Hgb pending today, ferrous sulfate 325mg BID     (R53.81) Physical deconditioning  Comment: acute/ongoing  Plan: PT and OT    (E44.0) moderate protein calorie malnutrition  Acute/ongoing  Plan: dietician to follow, SLP upgraded diet to regular textures and continue thin liquids,       (R13.10) Dysphagia, unspecified type  Comment: acute/ongoing  SLP followed during hospitalization, video swallow unable to perform due to delirium/agitation  Plan: diet minced and moist with thin liquids, upgraded to regular textures with thin liquids in TCU, SLP to follow in TCU     (I10) Essential hypertension with goal blood pressure less than 140/90  (I48.0)  Paroxysmal atrial fib    (N18.30) Stage 3 chronic kidney disease, unspecified whether stage 3a or 3b CKD (H)  Comment: acute/ongoing, no change  Plan: holding apixaban due to GI bleed, BMP follow, continue metoprolol tartrate 25mg BID  F/u with cardiology, make appt     (J44.0) Chronic obstructive pulmonary disease with acute lower respiratory infection (H)  Comment: acute/ongoing, no change  Plan: monitor SaO2 at rest and with activity, continue spiriva 18mcg QD, breo ellipta 200/25mcg QD, albuterol MDI 2 puffs q 6hours prn     (R41.89) Cognitive impairment  (R41.0) Delirium  Comment: acute/ongoing, no change  BIMS 12/15 and SBT 10/28  Plan: OT evaluation, DC seroquel   prn, continue zyprexa zydis 5mg BID prn     (G62.9) Peripheral polyneuropathy  Comment: chronic ongoing  Plan: PTA dose 600mg qhs decreased to 200mg Qhs, monitor for pain or discomfort      Post Medication Reconciliation Status: Discharge medications reconciled, continue medications without change        Orders:  No new orders      Electronically signed by: Tonya Lynn Haase, APRN CNP             Sincerely,        Tonya Lynn Haase, APRN CNP

## 2022-09-14 VITALS
HEIGHT: 68 IN | HEART RATE: 81 BPM | SYSTOLIC BLOOD PRESSURE: 106 MMHG | TEMPERATURE: 97.7 F | RESPIRATION RATE: 16 BRPM | OXYGEN SATURATION: 92 % | WEIGHT: 157 LBS | DIASTOLIC BLOOD PRESSURE: 62 MMHG | BODY MASS INDEX: 23.79 KG/M2

## 2022-09-14 NOTE — PROGRESS NOTES
"[facility] GERIATRICS    Chief Complaint   Patient presents with     RECHECK     HPI:  Hermilo Velasquez is a 89 year old  (11/3/1932), who is being seen today for an episodic care visit at: [facility]) [25]. Today's concern is:   GI bleed/anemia: Hgb 8.0, no further hematochezia noted  In therapy patient is walking up to 180 feet using a RW with SBA  Dysphagia: no concerns with swallowing  HTN/atrial fib/CKD: creat 1.24, /62, 111/56, 100/50 with HR 60-70 range, denies CP, palpitations, SOB  COPD: denies SOB, cough, congestion, SaO2 91-93% on room air  Malnutrition: weight stable 157.7lbs, talking supplements  Cognitive impairement BIMS 12/15 and SBT 10/28    Allergies, and PMH/PSH reviewed in [facility] today.  REVIEW OF SYSTEMS:  10 point ROS of systems including Constitutional, Eyes, Respiratory, Cardiovascular, Gastroenterology, Genitourinary, Integumentary, Musculoskeletal, Psychiatric were all negative except for pertinent positives noted in my HPI.    Objective:   /62   Pulse 81   Temp 97.7  F (36.5  C)   Resp 16   Ht 1.727 m (5' 8\")   Wt 71.2 kg (157 lb)   SpO2 92%   BMI 23.87 kg/m    GENERAL APPEARANCE:  Alert, in no distress  ENT:  Mouth and posterior oropharynx normal, moist mucous membranes, [place]  EYES:  EOM, conjunctivae, lids, pupils and irises normal, PERRL  RESP:  respiratory effort and palpation of chest normal, lungs clear to auscultation , no respiratory distress  CV:  Palpation and auscultation of heart done , regular rate and rhythm, no murmur, rub, or gallop, peripheral edema trace+ in LE bilaterally  ABDOMEN:  normal bowel sounds, soft, nontender, no hepatosplenomegaly or other masses  M/S:   patient sitting up in w/c  SKIN:  Inspection of skin and subcutaneous tissue baseline  NEURO:   speech wnl  PSYCH:  affect and mood normal    Recent labs in [facility] reviewed by me today.  and   Most Recent 3 CBC's:  Recent Labs   Lab Test 09/08/22  0749 09/07/22  0819 " 09/06/22  0951 09/04/22  0848 09/02/22  1535 09/01/22  0746 08/31/22  0829 08/27/22  1139 08/27/22  0613   WBC  --   --   --   --  4.9  --  4.9  --  7.4   HGB 8.6* 8.0* 8.1*   < > 8.5*   < > 8.5*  8.5*   < > 8.6*  8.6*   MCV  --   --   --   --  96  --  98  --  96   PLT  --   --   --   --  148*  --  129*  --  188    < > = values in this interval not displayed.     Most Recent 3 BMP's:  Recent Labs   Lab Test 09/08/22  0749 09/07/22  0819 09/06/22  0951    141 143   POTASSIUM 3.9  3.9 3.5  3.5 3.6   CHLORIDE 110* 110* 112*   CO2 27 28 27   BUN 16 16 17   CR 0.79 0.82 0.82   ANIONGAP 3 3 4   AMRITA 8.4* 8.4* 8.5   GLC 96 86 119*       Assessment/Plan:  K55.20) AVM (arteriovenous malformation) of colon  (primary encounter diagnosis)  Comment: acute/ongoing,   Hgb on admission to ED 5.8, received 2 units of PRBC's during hospitalization  Plan: continue famotidine 40mg BID (allergic to PPI)  F/u with GI as OP for colonoscopy  Holding apixiban until f/u with GI  GI appt on 9/28/22 with Dr. Florentino  Hgb 8.0 on 9/14/22     (D62) Anemia due to blood loss, acute  (D50.0) Iron deficiency anemia due to chronic blood loss  Comment: acute/ongoing  Transfuse for Hgb < 7.0  Plan: Hgb 8.0 on 9/14/22,continue ferrous sulfate 325mg BID     (R53.81) Physical deconditioning  Comment: acute/ongoing  Plan: PT and OT     (E44.0) moderate protein calorie malnutrition  Acute/ongoing, no change  Plan: dietician to follow, SLP upgraded diet to regular textures and continue thin liquids,   Mighty shake once a day      (R13.10) Dysphagia, unspecified type  Comment: acute/ongoing, no change  SLP followed during hospitalization, video swallow unable to perform due to delirium/agitation  Plan: diet minced and moist with thin liquids, upgraded to regular textures with thin liquids in TCU, SLP to follow in TCU     (I10) Essential hypertension with goal blood pressure less than 140/90  (I48.0) Paroxysmal atrial fib    (N18.30) Stage 3 chronic  kidney disease, unspecified whether stage 3a or 3b CKD (H)  Comment: acute/ongoing, no change  Plan: holding apixaban due to GI bleed, BMP follow, continue metoprolol tartrate 25mg BID  F/u with cardiology, make appt     (J44.0) Chronic obstructive pulmonary disease with acute lower respiratory infection (H)  Comment: acute/ongoing, no change  Plan: monitor SaO2 at rest and with activity, continue spiriva 18mcg QD, breo ellipta 200/25mcg QD, albuterol MDI 2 puffs q 6hours prn     (R41.89) Cognitive impairment  (R41.0) Delirium  Comment: acute/ongoing  BIMS 12/15 and SBT 10/28  Plan: OT evaluation,  DC zyprexa     (G62.9) Peripheral polyneuropathy  Comment: chronic ongoing, no change  Plan: PTA dose 600mg qhs decreased to 200mg Qhs, monitor for pain or discomfort      Post Medication Reconciliation Status: Patient was not discharged from an inpatient facility or TCU        Orders:  DC zyprexa and zofran      Electronically signed by: Tonya Lynn Haase, APRN CNP

## 2022-09-15 ENCOUNTER — TRANSITIONAL CARE UNIT VISIT (OUTPATIENT)
Dept: GERIATRICS | Facility: CLINIC | Age: 87
End: 2022-09-15
Payer: COMMERCIAL

## 2022-09-15 DIAGNOSIS — D62 ANEMIA DUE TO BLOOD LOSS, ACUTE: ICD-10-CM

## 2022-09-15 DIAGNOSIS — I10 ESSENTIAL HYPERTENSION WITH GOAL BLOOD PRESSURE LESS THAN 140/90: ICD-10-CM

## 2022-09-15 DIAGNOSIS — R41.89 COGNITIVE IMPAIRMENT: ICD-10-CM

## 2022-09-15 DIAGNOSIS — K55.20 AVM (ARTERIOVENOUS MALFORMATION) OF COLON: Primary | ICD-10-CM

## 2022-09-15 DIAGNOSIS — G62.9 PERIPHERAL POLYNEUROPATHY: ICD-10-CM

## 2022-09-15 DIAGNOSIS — E44.0 MODERATE PROTEIN-CALORIE MALNUTRITION (H): ICD-10-CM

## 2022-09-15 DIAGNOSIS — R13.10 DYSPHAGIA, UNSPECIFIED TYPE: ICD-10-CM

## 2022-09-15 DIAGNOSIS — J44.0 CHRONIC OBSTRUCTIVE PULMONARY DISEASE WITH ACUTE LOWER RESPIRATORY INFECTION (H): ICD-10-CM

## 2022-09-15 DIAGNOSIS — N18.30 STAGE 3 CHRONIC KIDNEY DISEASE, UNSPECIFIED WHETHER STAGE 3A OR 3B CKD (H): ICD-10-CM

## 2022-09-15 DIAGNOSIS — I48.0 PAROXYSMAL ATRIAL FIBRILLATION (H): ICD-10-CM

## 2022-09-15 DIAGNOSIS — D50.0 IRON DEFICIENCY ANEMIA DUE TO CHRONIC BLOOD LOSS: ICD-10-CM

## 2022-09-15 DIAGNOSIS — R53.81 PHYSICAL DECONDITIONING: ICD-10-CM

## 2022-09-15 DIAGNOSIS — R41.0 DELIRIUM: ICD-10-CM

## 2022-09-15 PROCEDURE — 99310 SBSQ NF CARE HIGH MDM 45: CPT | Performed by: NURSE PRACTITIONER

## 2022-09-15 NOTE — LETTER
"    9/15/2022        RE: Hermilo Velasquez  [address] [facility] [id number]        [facility]    Chief Complaint   Patient presents with     RECHECK     HPI:  Hermilo Velasquez is a 89 year old  (11/3/1932), who is being seen today for an episodic care visit at: [facility]) [25]. Today's concern is:   GI bleed/anemia: Hgb 8.0, no further hematochezia noted  In therapy patient is walking up to 180 feet using a RW with SBA  Dysphagia: no concerns with swallowing  HTN/atrial fib/CKD: creat 1.24, /62, 111/56, 100/50 with HR 60-70 range, denies CP, palpitations, SOB  COPD: denies SOB, cough, congestion, SaO2 91-93% on room air  Malnutrition: weight stable 157.7lbs, talking supplements  Cognitive impairement BIMS 12/15 and SBT 10/28    Allergies, and PMH/PSH reviewed in [facility] today.  REVIEW OF SYSTEMS:  10 point ROS of systems including Constitutional, Eyes, Respiratory, Cardiovascular, Gastroenterology, Genitourinary, Integumentary, Musculoskeletal, Psychiatric were all negative except for pertinent positives noted in my HPI.    Objective:   /62   Pulse 81   Temp 97.7  F (36.5  C)   Resp 16   Ht 1.727 m (5' 8\")   Wt 71.2 kg (157 lb)   SpO2 92%   BMI 23.87 kg/m    GENERAL APPEARANCE:  Alert, in no distress  ENT:  Mouth and posterior oropharynx normal, moist mucous membranes, [place]  EYES:  EOM, conjunctivae, lids, pupils and irises normal, PERRL  RESP:  respiratory effort and palpation of chest normal, lungs clear to auscultation , no respiratory distress  CV:  Palpation and auscultation of heart done , regular rate and rhythm, no murmur, rub, or gallop, peripheral edema trace+ in LE bilaterally  ABDOMEN:  normal bowel sounds, soft, nontender, no hepatosplenomegaly or other masses  M/S:   patient sitting up in w/c  SKIN:  Inspection of skin and subcutaneous tissue baseline  NEURO:   speech wnl  PSYCH:  affect and mood normal    Recent labs in [facility] reviewed by me " today.  and   Most Recent 3 CBC's:  Recent Labs   Lab Test 09/08/22  0749 09/07/22  0819 09/06/22  0951 09/04/22  0848 09/02/22  1535 09/01/22  0746 08/31/22  0829 08/27/22  1139 08/27/22  0613   WBC  --   --   --   --  4.9  --  4.9  --  7.4   HGB 8.6* 8.0* 8.1*   < > 8.5*   < > 8.5*  8.5*   < > 8.6*  8.6*   MCV  --   --   --   --  96  --  98  --  96   PLT  --   --   --   --  148*  --  129*  --  188    < > = values in this interval not displayed.     Most Recent 3 BMP's:  Recent Labs   Lab Test 09/08/22  0749 09/07/22  0819 09/06/22  0951    141 143   POTASSIUM 3.9  3.9 3.5  3.5 3.6   CHLORIDE 110* 110* 112*   CO2 27 28 27   BUN 16 16 17   CR 0.79 0.82 0.82   ANIONGAP 3 3 4   AMRITA 8.4* 8.4* 8.5   GLC 96 86 119*       Assessment/Plan:  K55.20) AVM (arteriovenous malformation) of colon  (primary encounter diagnosis)  Comment: acute/ongoing,   Hgb on admission to ED 5.8, received 2 units of PRBC's during hospitalization  Plan: continue famotidine 40mg BID (allergic to PPI)  F/u with GI as OP for colonoscopy  Holding apixiban until f/u with GI  GI appt on 9/28/22 with Dr. Florentino  Hgb 8.0 on 9/14/22     (D62) Anemia due to blood loss, acute  (D50.0) Iron deficiency anemia due to chronic blood loss  Comment: acute/ongoing  Transfuse for Hgb < 7.0  Plan: Hgb 8.0 on 9/14/22,continue ferrous sulfate 325mg BID     (R53.81) Physical deconditioning  Comment: acute/ongoing  Plan: PT and OT     (E44.0) moderate protein calorie malnutrition  Acute/ongoing, no change  Plan: dietician to follow, SLP upgraded diet to regular textures and continue thin liquids,   Mighty shake once a day      (R13.10) Dysphagia, unspecified type  Comment: acute/ongoing, no change  SLP followed during hospitalization, video swallow unable to perform due to delirium/agitation  Plan: diet minced and moist with thin liquids, upgraded to regular textures with thin liquids in TCU, SLP to follow in TCU     (I10) Essential hypertension with goal blood  pressure less than 140/90  (I48.0) Paroxysmal atrial fib    (N18.30) Stage 3 chronic kidney disease, unspecified whether stage 3a or 3b CKD (H)  Comment: acute/ongoing, no change  Plan: holding apixaban due to GI bleed, BMP follow, continue metoprolol tartrate 25mg BID  F/u with cardiology, make appt     (J44.0) Chronic obstructive pulmonary disease with acute lower respiratory infection (H)  Comment: acute/ongoing, no change  Plan: monitor SaO2 at rest and with activity, continue spiriva 18mcg QD, breo ellipta 200/25mcg QD, albuterol MDI 2 puffs q 6hours prn     (R41.89) Cognitive impairment  (R41.0) Delirium  Comment: acute/ongoing  BIMS 12/15 and SBT 10/28  Plan: OT evaluation,  DC zyprexa     (G62.9) Peripheral polyneuropathy  Comment: chronic ongoing, no change  Plan: PTA dose 600mg qhs decreased to 200mg Qhs, monitor for pain or discomfort      Post Medication Reconciliation Status: Patient was not discharged from an inpatient facility or TCU        Orders:  DC zyprexa and zofran      Electronically signed by: Tonya Lynn Haase, APRN CNP             Sincerely,        Tonya Lynn Haase, APRN CNP

## 2022-09-15 NOTE — PROGRESS NOTES
[facility] GERIATRICS DISCHARGE SUMMARY  PATIENT'S NAME: Hermilo Velasquez  YOB: 1932  MEDICAL RECORD NUMBER:  [id number]  Place of Service where encounter took place:  [facility]) [25]    PRIMARY CARE PROVIDER AND CLINIC RESPONSIBLE AFTER TRANSFER:   Karson Bishop MD, [address] / [place] [address]    [facility] [place] [facility] Provider     Transferring providers: Tonya Lynn Haase, APRN CNP, Charles You MD  Recent Hospitalization/ED:  [facility] stay 8/23/22 to 9/8/22.  Date of SNF Admission: September 08, 2022  Date of SNF (anticipated) Discharge: September 19, 2022  Discharged to: previous independent home  Cognitive Scores: BIMS: 12/15 and Short blessed: 10/28  Physical Function: Ambulating 190 ft with RW SBA  DME: Walker    CODE STATUS/ADVANCE DIRECTIVES DISCUSSION:  Prior   ALLERGIES: Omeprazole, Pantoprazole, Prevacid [lansoprazole], Lasix [furosemide], Lidocaine, Penicillin g, and Penicillins    NURSING FACILITY COURSE   Medication Changes/Rationale:     See assessment and plan    Summary of nursing facility stay:   Patient progressed in therapy to walking up to 190 feet using a RW with SBA, cues for safety, patient is very [place], assist with ADL's and toileting. Patient lives at home with wife will have home PT, OT, RN and HHA through    Discharge Medications:    Post Medication Reconciliation Status:           Current Outpatient Medications   Medication Sig Dispense Refill     ACE/ARB/ARNI NOT PRESCRIBED (INTENTIONAL) Please choose reason not prescribed from choices below. (Patient not taking: No sig reported)       acetaminophen (TYLENOL) 500 MG tablet Take 1,000 mg by mouth 3 times daily as needed       albuterol (PROAIR HFA/PROVENTIL HFA/VENTOLIN HFA) 108 (90 Base) MCG/ACT inhaler Inhale 2 puffs into the lungs every 6 hours as needed       albuterol (PROVENTIL) (2.5 MG/3ML) 0.083% neb solution Take 1 vial (2.5 mg) by nebulization every 6  hours as needed for shortness of breath / dyspnea or wheezing 180 mL 11     alfuzosin ER (UROXATRAL) 10 MG 24 hr tablet Take 1 tablet (10 mg) by mouth daily 90 tablet 3     atorvastatin (LIPITOR) 10 MG tablet Take 1 tablet (10 mg) by mouth daily 90 tablet 2     benztropine (COGENTIN) 1 MG tablet Take 1 tablet (1 mg) by mouth 3 times daily as needed for other (for extrapyramidal effects)       dimenhyDRINATE (DRAMAMINE) 50 MG tablet Take 50 mg by mouth as needed       DULoxetine (CYMBALTA) 60 MG capsule Take 1 capsule daily for 7 days then take 2 capsules daily       famotidine (PEPCID) 40 MG tablet Take 40 mg by mouth 2 times daily       ferrous sulfate (FE TABS) 325 (65 Fe) MG EC tablet Take 325 mg by mouth 2 times daily       fluticasone-vilanterol (BREO ELLIPTA) 200-25 MCG/INH inhaler Inhale 1 puff into the lungs daily       gabapentin (NEURONTIN) 100 MG capsule Take 2 capsules (200 mg) by mouth At Bedtime       metoprolol tartrate (LOPRESSOR) 25 MG tablet Take 1 tablet (25 mg) by mouth 2 times daily       nystatin (MYCOSTATIN) 066273 UNIT/GM external powder Apply to groin/abdomen folds topically two times a day for  redness until healed then D/C       polyethylene glycol (MIRALAX) 17 g packet Take 17 g by mouth daily 7 packet 0     senna-docusate (SENOKOT-S/PERICOLACE) 8.6-50 MG tablet Take 1 tablet by mouth 2 times daily as needed for constipation Take while on oral narcotics to prevent or treat constipation. 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 Vasques      Aortic stenosis     Severe AS, 9/2015 AVR - [facility] TRIFECTA Bovine bioprosthesis 25MM  "TF-25A     Atrial fibrillation (H)     9/2015 Paroxysmal post op Afib - discharged on Warfarin and a beta blocker     COPD (chronic obstructive pulmonary disease) (H)      Deep vein thrombosis (H)      GERD (gastroesophageal reflux disease)      Heart murmur      Monoclonal gammopathy     plasmacyte prominent causing monoclonal gammopathy     Need for SBE (subacute bacterial endocarditis) prophylaxis      Neuropathy      Other and unspecified hyperlipidemia      Other malignant lymphomas     non hodgkin's lymphoma     RBBB (right bundle branch block)      Severe sepsis with acute organ dysfunction (H) 11/16/2015     Unspecified hereditary and idiopathic peripheral neuropathy      Physical Exam:   Vitals: /62   Pulse 81   Temp 97.9  F (36.6  C)   Resp 16   Ht 1.727 m (5' 8\")   Wt 71.2 kg (157 lb)   SpO2 92%   BMI 23.87 kg/m    BMI: Body mass index is 23.87 kg/m .  GENERAL APPEARANCE:  Alert, in no distress  ENT:  Mouth and posterior oropharynx normal, moist mucous membranes, [place]  EYES:  EOM, conjunctivae, lids, pupils and irises normal, PERRL  RESP:  respiratory effort and palpation of chest normal, lungs clear to auscultation , no respiratory distress  CV:  Palpation and auscultation of heart done , regular rate and rhythm, no murmur, rub, or gallop, peripheral edema trace+ in LE bilaterally  ABDOMEN:  normal bowel sounds, soft, nontender, no hepatosplenomegaly or other masses  M/S:   patient sitting up in w/c  SKIN:  Inspection of skin and subcutaneous tissue baseline  NEURO:   speech wnl  PSYCH:  affect and mood normal     SNF labs: Recent labs in [facility] reviewed by me today.  and   Most Recent 3 CBC's:Recent Labs   Lab Test 09/08/22  0749 09/07/22  0819 09/06/22  0951 09/04/22  0848 09/02/22  1535 09/01/22  0746 08/31/22  0829 08/27/22  1139 08/27/22  0613   WBC  --   --   --   --  4.9  --  4.9  --  7.4   HGB 8.6* 8.0* 8.1*   < > 8.5*   < > 8.5*  8.5*   < > 8.6*  8.6*   MCV  --   --   --   --  96  -- "  98  --  96   PLT  --   --   --   --  148*  --  129*  --  188    < > = values in this interval not displayed.     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 Florentino  Hgb 8.0 on 9/14/22  DC to home with home PT, OT, RN and HHA through [facility]     (D62) Anemia due to blood loss, acute  (D50.0) Iron deficiency anemia due to chronic blood loss  Comment: acute/ongoing  Transfuse for Hgb < 7.0  Plan: Hgb 8.0 on 9/14/22,continue ferrous sulfate 325mg BID     (R53.81) Physical deconditioning  Comment: acute/ongoing  Plan: DC to home with home PT, OT, RN and HHA through [facility]     (E44.0) moderate protein calorie malnutrition  Acute/ongoing,   Plan: diet regular textures and continue thin liquids,   Mighty shake once a day      (R13.10) Dysphagia, unspecified type  Comment: acute/ongoing, no change  SLP followed during hospitalization, video swallow unable to perform due to delirium/agitation  Plan: diet minced and moist with thin liquids, upgraded to regular textures with thin liquids in TCU tolerating well     (I10) Essential hypertension with goal blood pressure less than 140/90  (I48.0) Paroxysmal atrial fib    (N18.30) Stage 3 chronic kidney disease, unspecified whether stage 3a or 3b CKD (H)  Comment: acute/ongoing,   Plan: holding apixaban due to GI bleed, continue metoprolol tartrate 25mg BID  F/u with cardiology as  directed     (J44.0) Chronic obstructive pulmonary disease with acute lower respiratory infection (H)  Comment: acute/ongoing,   Plan: continue spiriva 18mcg QD, breo ellipta 200/25mcg QD, albuterol MDI 2 puffs q 6hours prn     (R41.89) Cognitive impairment  (R41.0) Delirium resolved  Comment: acute/ongoing  BIMS 12/15 and SBT 10/28  Plan: f/u with PCP  DC zyprexa and seroquel during TCU stay, patient did not require      (G62.9) Peripheral polyneuropathy  Comment: chronic ongoing, no change  Plan: PTA dose 600mg qhs decreased to 200mg Qhs    DISCHARGE PLAN:    Follow up labs: No labs orders/due    Medical Follow Up:      Follow up with primary care provider in 1-2 weeks    Current [place] scheduled appointments:       Discharge Services: Home Care:  Occupational Therapy, Physical Therapy, Registered Nurse and Home Health Aide    Discharge Instructions Verbalized to Patient at Discharge:     None    TOTAL DISCHARGE TIME:   Greater than 30 minutes  Electronically signed by:  Tonya Lynn Haase, APRN CNP     Home care Face to Face documentation done in [facility] attached to Home care orders for [facility]. , Documentation of Face to Face and Certification for Home Health Services    I certify that patient: Hermilo Velasquez is under my care and that I, or a nurse practitioner or physician's assistant working with me, had a face-to-face encounter that meets the physician face-to-face encounter requirements with this patient on: 9/16/2022.    This encounter with the patient was in whole, or in part, for the following medical condition, which is the primary reason for home health care: anemia, deconditioning.    I certify that, based on my findings, the following services are medically necessary home health services: Nursing, Occupational Therapy and Physical Therapy.    My clinical findings support the need for the above services because: Nurse is needed: To assess vitals, medication management after changes in  medications or other medical regimen.., Occupational Therapy Services are needed to assess and treat cognitive ability and address ADL safety due to impairment in ADL training home safety. and Physical Therapy Services are needed to assess and treat the following functional impairments: strengthening, endurance, home safety.    Further, I certify that my clinical findings support that this patient is homebound (i.e. absences from home require considerable and taxing effort and are for medical reasons or Gnosticist services or infrequently or of short duration when for other reasons) because: Requires assistance of another person or specialized equipment to access medical services because patient: Requires supervision of another for safe transfer...    Based on the above findings. I certify that this patient is confined to the home and needs intermittent skilled nursing care, physical therapy and/or speech therapy.  The patient is under my care, and I have initiated the establishment of the plan of care.  This patient will be followed by a physician who will periodically review the plan of care.  Physician/Provider to provide follow up care: Karson Bishop    Attending hospital physician (the Medicare certified PECOS provider): Tonya Lynn Haase, APRN CNP  Physician Signature: See electronic signature associated with these discharge orders.  Date: 9/16/2022

## 2022-09-16 ENCOUNTER — DISCHARGE SUMMARY NURSING HOME (OUTPATIENT)
Dept: GERIATRICS | Facility: CLINIC | Age: 87
End: 2022-09-16
Payer: COMMERCIAL

## 2022-09-16 VITALS
BODY MASS INDEX: 23.79 KG/M2 | TEMPERATURE: 97.9 F | HEART RATE: 81 BPM | SYSTOLIC BLOOD PRESSURE: 106 MMHG | HEIGHT: 68 IN | RESPIRATION RATE: 16 BRPM | WEIGHT: 157 LBS | DIASTOLIC BLOOD PRESSURE: 62 MMHG | OXYGEN SATURATION: 92 %

## 2022-09-16 DIAGNOSIS — J44.0 CHRONIC OBSTRUCTIVE PULMONARY DISEASE WITH ACUTE LOWER RESPIRATORY INFECTION (H): ICD-10-CM

## 2022-09-16 DIAGNOSIS — R41.89 COGNITIVE IMPAIRMENT: ICD-10-CM

## 2022-09-16 DIAGNOSIS — I10 ESSENTIAL HYPERTENSION WITH GOAL BLOOD PRESSURE LESS THAN 140/90: ICD-10-CM

## 2022-09-16 DIAGNOSIS — R41.0 DELIRIUM: ICD-10-CM

## 2022-09-16 DIAGNOSIS — G62.9 PERIPHERAL POLYNEUROPATHY: ICD-10-CM

## 2022-09-16 DIAGNOSIS — R53.81 PHYSICAL DECONDITIONING: ICD-10-CM

## 2022-09-16 DIAGNOSIS — E44.0 MODERATE PROTEIN-CALORIE MALNUTRITION (H): ICD-10-CM

## 2022-09-16 DIAGNOSIS — R13.10 DYSPHAGIA, UNSPECIFIED TYPE: ICD-10-CM

## 2022-09-16 DIAGNOSIS — N18.30 STAGE 3 CHRONIC KIDNEY DISEASE, UNSPECIFIED WHETHER STAGE 3A OR 3B CKD (H): ICD-10-CM

## 2022-09-16 DIAGNOSIS — D62 ANEMIA DUE TO BLOOD LOSS, ACUTE: ICD-10-CM

## 2022-09-16 DIAGNOSIS — D50.0 IRON DEFICIENCY ANEMIA DUE TO CHRONIC BLOOD LOSS: ICD-10-CM

## 2022-09-16 DIAGNOSIS — I48.0 PAROXYSMAL ATRIAL FIBRILLATION (H): ICD-10-CM

## 2022-09-16 DIAGNOSIS — K55.20 AVM (ARTERIOVENOUS MALFORMATION) OF COLON: Primary | ICD-10-CM

## 2022-09-16 PROCEDURE — 99316 NF DSCHRG MGMT 30 MIN+: CPT | Performed by: NURSE PRACTITIONER

## 2022-09-16 NOTE — LETTER
9/16/2022        RE: Hermilo Velasquez  [address]  [facility] [address]        [facility] GERIATRICS DISCHARGE SUMMARY  PATIENT'S NAME: Hermilo Velasquez  YOB: 1932  MEDICAL RECORD NUMBER:  [id number]  [facility] [[facility]    PRIMARY CARE PROVIDER AND CLINIC RESPONSIBLE AFTER TRANSFER:   Karson Bishop MD, [address] / [place] [address]    [facility] Provider     Transferring providers: Tonya Lynn Haase, APRN CNP, Charles You MD  Recent Hospitalization/ED:  [facility] stay 8/23/22 to 9/8/22.  Date of SNF Admission: September 08, 2022  Date of SNF (anticipated) Discharge: September 19, 2022  Discharged to: previous independent home  Cognitive Scores: BIMS: 12/15 and Short blessed: 10/28  Physical Function: Ambulating 190 ft with RW SBA  DME: Walker    CODE STATUS/ADVANCE DIRECTIVES DISCUSSION:  Prior   ALLERGIES: Omeprazole, Pantoprazole, Prevacid [lansoprazole], Lasix [furosemide], Lidocaine, Penicillin g, and Penicillins    NURSING FACILITY COURSE   Medication Changes/Rationale:     See assessment and plan    Summary of nursing facility stay:   Patient progressed in therapy to walking up to 190 feet using a RW with SBA, cues for safety, patient is very [place], assist with ADL's and toileting. Patient lives at home with wife will have home PT, OT, RN and HHA through    Discharge Medications:    Post Medication Reconciliation Status:           Current Outpatient Medications   Medication Sig Dispense Refill     ACE/ARB/ARNI NOT PRESCRIBED (INTENTIONAL) Please choose reason not prescribed from choices below. (Patient not taking: No sig reported)       acetaminophen (TYLENOL) 500 MG tablet Take 1,000 mg by mouth 3 times daily as needed       albuterol (PROAIR HFA/PROVENTIL HFA/VENTOLIN HFA) 108 (90 Base) MCG/ACT inhaler Inhale 2 puffs into the lungs every 6 hours as needed        albuterol (PROVENTIL) (2.5 MG/3ML) 0.083% neb solution Take 1 vial (2.5 mg) by nebulization every 6 hours as needed for shortness of breath / dyspnea or wheezing 180 mL 11     alfuzosin ER (UROXATRAL) 10 MG 24 hr tablet Take 1 tablet (10 mg) by mouth daily 90 tablet 3     atorvastatin (LIPITOR) 10 MG tablet Take 1 tablet (10 mg) by mouth daily 90 tablet 2     benztropine (COGENTIN) 1 MG tablet Take 1 tablet (1 mg) by mouth 3 times daily as needed for other (for extrapyramidal effects)       dimenhyDRINATE (DRAMAMINE) 50 MG tablet Take 50 mg by mouth as needed       DULoxetine (CYMBALTA) 60 MG capsule Take 1 capsule daily for 7 days then take 2 capsules daily       famotidine (PEPCID) 40 MG tablet Take 40 mg by mouth 2 times daily       ferrous sulfate (FE TABS) 325 (65 Fe) MG EC tablet Take 325 mg by mouth 2 times daily       fluticasone-vilanterol (BREO ELLIPTA) 200-25 MCG/INH inhaler Inhale 1 puff into the lungs daily       gabapentin (NEURONTIN) 100 MG capsule Take 2 capsules (200 mg) by mouth At Bedtime       metoprolol tartrate (LOPRESSOR) 25 MG tablet Take 1 tablet (25 mg) by mouth 2 times daily       nystatin (MYCOSTATIN) 878744 UNIT/GM external powder Apply to groin/abdomen folds topically two times a day for  redness until healed then D/C       polyethylene glycol (MIRALAX) 17 g packet Take 17 g by mouth daily 7 packet 0     senna-docusate (SENOKOT-S/PERICOLACE) 8.6-50 MG tablet Take 1 tablet by mouth 2 times daily as needed for constipation Take while on oral narcotics to prevent or treat constipation. 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    S/P RLL Wedge resection with   "[facility]      Aortic stenosis     Severe AS, 9/2015 AVR - [facility] TRIFECTA Bovine bioprosthesis 25MM TF-25A     Atrial fibrillation (H)     9/2015 Paroxysmal post op Afib - discharged on Warfarin and a beta blocker     COPD (chronic obstructive pulmonary disease) (H)      Deep vein thrombosis (H)      GERD (gastroesophageal reflux disease)      Heart murmur      Monoclonal gammopathy     plasmacyte prominent causing monoclonal gammopathy     Need for SBE (subacute bacterial endocarditis) prophylaxis      Neuropathy      Other and unspecified hyperlipidemia      Other malignant lymphomas     non hodgkin's lymphoma     RBBB (right bundle branch block)      Severe sepsis with acute organ dysfunction (H) 11/16/2015     Unspecified hereditary and idiopathic peripheral neuropathy      Physical Exam:   Vitals: /62   Pulse 81   Temp 97.9  F (36.6  C)   Resp 16   Ht 1.727 m (5' 8\")   Wt 71.2 kg (157 lb)   SpO2 92%   BMI 23.87 kg/m    BMI: Body mass index is 23.87 kg/m .  GENERAL APPEARANCE:  Alert, in no distress  ENT:  Mouth and posterior oropharynx normal, moist mucous membranes, [place]  EYES:  EOM, conjunctivae, lids, pupils and irises normal, PERRL  RESP:  respiratory effort and palpation of chest normal, lungs clear to auscultation , no respiratory distress  CV:  Palpation and auscultation of heart done , regular rate and rhythm, no murmur, rub, or gallop, peripheral edema trace+ in LE bilaterally  ABDOMEN:  normal bowel sounds, soft, nontender, no hepatosplenomegaly or other masses  M/S:   patient sitting up in w/c  SKIN:  Inspection of skin and subcutaneous tissue baseline  NEURO:   speech wnl  PSYCH:  affect and mood normal     SNF labs: Recent labs in [facility] reviewed by me today.  and   Most Recent 3 CBC's:Recent Labs   Lab Test 09/08/22  0749 09/07/22  0819 09/06/22  0951 09/04/22  0848 09/02/22  1535 09/01/22  0746 08/31/22  0829 08/27/22  1139 08/27/22  0613   WBC  --   --   --   --  4.9  --  4.9  --  7.4 "   HGB 8.6* 8.0* 8.1*   < > 8.5*   < > 8.5*  8.5*   < > 8.6*  8.6*   MCV  --   --   --   --  96  --  98  --  96   PLT  --   --   --   --  148*  --  129*  --  188    < > = values in this interval not displayed.     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 Florentino  Hgb 8.0 on 9/14/22  DC to home with home PT, OT, RN and HHA through [facility]     (D62) Anemia due to blood loss, acute  (D50.0) Iron deficiency anemia due to chronic blood loss  Comment: acute/ongoing  Transfuse for Hgb < 7.0  Plan: Hgb 8.0 on 9/14/22,continue ferrous sulfate 325mg BID     (R53.81) Physical deconditioning  Comment: acute/ongoing  Plan: DC to home with home PT, OT, RN and HHA through [facility]     (E44.0) moderate protein calorie malnutrition  Acute/ongoing,   Plan: diet regular textures and continue thin liquids,   Mighty shake once a day      (R13.10) Dysphagia, unspecified type  Comment: acute/ongoing, no change  SLP followed during hospitalization, video swallow unable to perform due to delirium/agitation  Plan: diet minced and moist with thin liquids, upgraded to regular textures with thin liquids in TCU tolerating well     (I10) Essential hypertension with goal blood pressure less than 140/90  (I48.0) Paroxysmal atrial fib    (N18.30) Stage 3 chronic kidney disease, unspecified whether stage 3a or 3b CKD (H)  Comment: acute/ongoing,    Plan: holding apixaban due to GI bleed, continue metoprolol tartrate 25mg BID  F/u with cardiology as directed     (J44.0) Chronic obstructive pulmonary disease with acute lower respiratory infection (H)  Comment: acute/ongoing,   Plan: continue spiriva 18mcg QD, breo ellipta 200/25mcg QD, albuterol MDI 2 puffs q 6hours prn     (R41.89) Cognitive impairment  (R41.0) Delirium resolved  Comment: acute/ongoing  BIMS 12/15 and SBT 10/28  Plan: f/u with PCP  DC zyprexa and seroquel during TCU stay, patient did not require      (G62.9) Peripheral polyneuropathy  Comment: chronic ongoing, no change  Plan: PTA dose 600mg qhs decreased to 200mg Qhs    DISCHARGE PLAN:    Follow up labs: No labs orders/due    Medical Follow Up:      Follow up with primary care provider in 1-2 weeks    [facility] scheduled appointments:       Discharge Services: Home Care:  Occupational Therapy, Physical Therapy, Registered Nurse and Home Health Aide    Discharge Instructions Verbalized to Patient at Discharge:     None    TOTAL DISCHARGE TIME:   Greater than 30 minutes  Electronically signed by:  Tonya Lynn Haase, APRN CNP     Home care Face to Face documentation done in EP[facility] attached to Home care orders for [facility]. , Documentation of Face to Face and Certification for Home Health Services    I certify that patient: Hermilo Velasquez is under my care and that I, or a nurse practitioner or physician's assistant working with me, had a face-to-face encounter that meets the physician face-to-face encounter requirements with this patient on: 9/16/2022.    This encounter with the patient was in whole, or in part, for the following medical condition, which is the primary reason for home health care: anemia, deconditioning.    I certify that, based on my findings, the following services are medically necessary home health services: Nursing, Occupational Therapy and Physical Therapy.    My clinical findings support the need for the  above services because: Nurse is needed: To assess vitals, medication management after changes in medications or other medical regimen.., Occupational Therapy Services are needed to assess and treat cognitive ability and address ADL safety due to impairment in ADL training home safety. and Physical Therapy Services are needed to assess and treat the following functional impairments: strengthening, endurance, home safety.    Further, I certify that my clinical findings support that this patient is homebound (i.e. absences from home require considerable and taxing effort and are for medical reasons or Yazidi services or infrequently or of short duration when for other reasons) because: Requires assistance of another person or specialized equipment to access medical services because patient: Requires supervision of another for safe transfer...    Based on the above findings. I certify that this patient is confined to the home and needs intermittent skilled nursing care, physical therapy and/or speech therapy.  The patient is under my care, and I have initiated the establishment of the plan of care.  This patient will be followed by a physician who will periodically review the plan of care.  Physician/Provider to provide follow up care: Karson Bishop    Attending hospital physician (the Medicare certified PEC provider): Tonya Lynn Haase, APRN CNP  Physician Signature: See electronic signature associated with these discharge orders.  Date: 9/16/2022                  Sincerely,        Tonya Lynn Haase, APRN CNP

## 2022-09-16 NOTE — LETTER
9/16/2022        RE: Hermilo Velasquez  [address]  [facility] [address]        No notes on file      Sincerely,        Tonya Lynn Haase, APRN CNP

## 2022-09-21 ENCOUNTER — TELEPHONE (OUTPATIENT)
Dept: FAMILY MEDICINE | Facility: CLINIC | Age: 87
End: 2022-09-21

## 2022-09-21 ENCOUNTER — PATIENT OUTREACH (OUTPATIENT)
Dept: CARE COORDINATION | Facility: CLINIC | Age: 87
End: 2022-09-21

## 2022-09-21 ENCOUNTER — MEDICAL CORRESPONDENCE (OUTPATIENT)
Dept: HEALTH INFORMATION MANAGEMENT | Facility: CLINIC | Age: 87
End: 2022-09-21

## 2022-09-21 NOTE — LETTER
[facility] CARE COORDINATION  [address] [place] [address]    September 21, 2022    Hermilo Velasquez  [address]  [facility] [address]      Dear Hermilo,        I am a clinic care coordinator who works with Karson Bishop MD with the [facility]. I wanted to thank you for spending the time to talk with me.  Below is a description of clinic care coordination and how I can further assist you.       The clinic care coordination team is made up of a registered nurse, , financial resource worker and community health worker who understand the health care system. The goal of clinic care coordination is to help you manage your health and improve access to the health care system. Our team works alongside your provider to assist you in determining your health and social needs. We can help you obtain health care and community resources, providing you with necessary information and education. We can work with you through any barriers and develop a care plan that helps coordinate and strengthen the communication between you and your care team.    Please feel free to contact me with any questions or concerns regarding care coordination and what we can offer.      We are focused on providing you with the highest-quality healthcare experience possible.    Sincerely,     Ana Johnston, [facility]  Clinic Care Coordinator  [facility]  [phone]

## 2022-09-21 NOTE — PROGRESS NOTES
Clinic Care Coordination Contact  [facility]: Post-Discharge Note  SITUATION                                                      Admission:    Admission Date: 08/23/22   Reason for Admission: weakness and falls with acute blood loss anemia  Discharge:   Discharge Date: 09/08/22  Discharge Diagnosis: GI hemorrhage, Anemia    BACKGROUND                                                      Per hospital discharge summary and inpatient provider notes:    [facility]     Discharge Summary  Hospitalist     Date of Admission:  8/23/2022  Date of Discharge:  9/8/2022        Discharge Diagnoses        Anemia, unspecified type  Gastrointestinal hemorrhage, unspecified gastrointestinal hemorrhage type  Lumbar post-laminectomy syndrome       ASSESSMENT           Discharge Assessment  How are you doing now that you are home?: Pt shared that he is doing well at home. Home Care will be coming today to start services.  How are your symptoms? (Red Flag symptoms escalate to triage hotline per guidelines): Improved  Do you feel your condition is stable enough to be safe at home until your provider visit?: Yes  Does the patient have their discharge instructions? : Yes  Does the patient have questions regarding their discharge instructions? : No  Were you started on any new medications or were there changes to any of your previous medications? : Yes  Does the patient have all of their medications?: Yes  Do you have questions regarding any of your medications? : Yes (see comment) (Pt wonders if he should restart blood thinner, pt will discuss at next appointment)  Do you have all of your needed medical supplies or equipment (DME)?  (i.e. oxygen tank, CPAP, cane, etc.): Yes  Discharge follow-up appointment scheduled within 14 calendar days? : Yes  Discharge Follow Up Appointment Date: 10/03/22  Discharge Follow Up Appointment Scheduled with?: Primary Care Provider (Pt has an appointment with Dr. Florentino but  unsure of the date)         Post-op (Clinicians Only)  Did the patient have surgery or a procedure: No  Fever: No  Chills: No  Eating & Drinking: eating and drinking without complaints/concerns    PLAN                                                      Outpatient Plan:      Future Appointments   Date Time Provider Department Center   10/3/2022 12:30 PM [facility]   11/9/2022 10:45 AM Tab Grijalva MD [facility]         For any urgent concerns, please contact our 24 hour nurse triage line: [phone] ([phone])       Ana Johnston [facility]  Clinic Care Coordinator  [facility]  [phone]  [email or web address]

## 2022-09-23 ENCOUNTER — HOSPITAL ENCOUNTER (OUTPATIENT)
Facility: CLINIC | Age: 87
End: 2022-09-23
Attending: INTERNAL MEDICINE | Admitting: INTERNAL MEDICINE
Payer: COMMERCIAL

## 2022-09-27 NOTE — TELEPHONE ENCOUNTER
Call to Liz from [facility] at [phone]. Liz informed of Dr. Bishop's below response. Liz verbalized understanding.     Lidya Vela, RN BSN MSN  [facility]    
I approve of requested home care orders.    Karson Bishop MD    
Reason for Call: Request for an order or referral: for Skil nursing 1 x week for 1 week, PT 2 x week for 3 weeks , 1 week for 1 week.    Order or referral being requested:     Date needed: by next week    Has the patient been seen by the PCP for this problem? YES    Additional comments: Teresita from [facility]    Phone number can be reached at:  [phone]    Best Time:  anytime    Can we leave a detailed message on this number?  YES    Call taken on 9/21/2022 at 1:23 PM by Evelia Begum    
Teresita from [facility] called to follow up on Home Care Orders. Routing to PCP.     PCP: please see below.    Ruchi Henry RN  [facility] Triage Team    
no

## 2022-10-03 ENCOUNTER — VIRTUAL VISIT (OUTPATIENT)
Dept: FAMILY MEDICINE | Facility: CLINIC | Age: 87
End: 2022-10-03
Payer: COMMERCIAL

## 2022-10-03 ENCOUNTER — TELEPHONE (OUTPATIENT)
Dept: FAMILY MEDICINE | Facility: CLINIC | Age: 87
End: 2022-10-03

## 2022-10-03 DIAGNOSIS — D62 ANEMIA DUE TO BLOOD LOSS, ACUTE: Primary | ICD-10-CM

## 2022-10-03 PROCEDURE — 99495 TRANSJ CARE MGMT MOD F2F 14D: CPT | Mod: 95 | Performed by: INTERNAL MEDICINE

## 2022-10-03 NOTE — TELEPHONE ENCOUNTER
I think he should continue to get checked on by home RN  High risk for needing to return hospital  I spoke to Chapito about this, I think I convinced him to continue with home RN

## 2022-10-03 NOTE — PROGRESS NOTES
Hermilo is a 89 year old who is being evaluated via a billable telephone visit.      What phone number would you like to be contacted at? [phone]  How would you like to obtain your AVS? MyChart    Assessment & Plan     Anemia due to blood loss, acute  Difficult to make much progress by telephone visit  Try to arrange face to face appointment as soon as we can   I did my best to make the argument that he should continue with home care services as he is at very high risk for needing to return to hospital           24 minutes spent on the date of the encounter doing chart review, history and exam, documentation and further activities per the note           No follow-ups on file.    Karson Bishop MD  [facility]    Aleksander Akhtar is a 89 year old accompanied by his spouse, presenting for the following health issues:  Hospital F/U       HPI     Post Discharge Outreach 9/21/2022   Admission Date 8/23/2022   Reason for Admission weakness and falls with acute blood loss anemia   Discharge Date 9/8/2022   Discharge Diagnosis GI hemorrhage, Anemia   How are you doing now that you are home? Pt shared that he is doing well at home. Home Care will be coming today to start services.   How are your symptoms? (Red Flag symptoms escalate to triage hotline per guidelines) Improved   Do you feel your condition is stable enough to be safe at home until your provider visit? Yes   Does the patient have their discharge instructions?  Yes   Does the patient have questions regarding their discharge instructions?  No   Were you started on any new medications or were there changes to any of your previous medications?  Yes   Does the patient have all of their medications? Yes   Do you have questions regarding any of your medications?  Yes (see comment)   Do you have all of your needed medical supplies or equipment (DME)?  (i.e. oxygen tank, CPAP, cane, etc.) Yes   Discharge follow-up appointment scheduled within  14 calendar days?  Yes   Discharge Follow Up Appointment Date 10/3/2022   Discharge Follow Up Appointment Scheduled with? Primary Care Provider     Hospital Follow-up Visit:    Hospital/Nursing Home: [facility]  Date of Admission: 9/8/22  Date of Discharge: 9/19/22  Reason(s) for Admission:   Anemia, unspecified type  Gastrointestinal hemorrhage, unspecified gastrointestinal hemorrhage type  Lumbar post-laminectomy syndrome    Was your hospitalization related to COVID-19? No   Problems taking medications regularly:  None  Medication changes since discharge: None  Problems adhering to non-medication therapy:      Summary of hospitalization:  [facility] discharge summary reviewed  Diagnostic Tests/Treatments reviewed.  Follow up needed: cardiology , oncology   Other Healthcare Providers Involved in Patient s Care:         Homecare  Update since discharge: improved.   Post Medication Reconciliation Status:        Plan of care communicated with wife       History is difficult due to hearing loss and memory problems  Hospitalized for anemia  NOAC stopped, I agree with that plan  Has been refusing home care RN  He may have fallen twice since TCU discharge   His wife thinks he is doing OK and eating and drinking OK    Review of Systems         Objective           Vitals:  No vitals were obtained today due to virtual visit.    Physical Exam   He sounds comfortable, but communication is difficult, his memory seems to be worse               Phone call duration: 24 minutes

## 2022-10-03 NOTE — TELEPHONE ENCOUNTER
Patient is refusing skilled home care nursing visits.    Diane RN needs verbal order to stop skilled nursing.    Can we leave a detailed message on this number? YES  Phone number patient can be reached at: Other phone number:  [phone]    Didi Lam, RN  [facility] Triage

## 2022-10-04 NOTE — TELEPHONE ENCOUNTER
Message from Dr. Bishop given Diane BROOKS. Diane RN agrees to reach out to the patient and encourage scheduling of home care visits. Didi Lam RN

## 2022-10-06 ENCOUNTER — TRANSFERRED RECORDS (OUTPATIENT)
Dept: HEALTH INFORMATION MANAGEMENT | Facility: CLINIC | Age: 87
End: 2022-10-06

## 2022-10-06 ENCOUNTER — LAB (OUTPATIENT)
Dept: LAB | Facility: CLINIC | Age: 87
End: 2022-10-06
Payer: COMMERCIAL

## 2022-10-06 DIAGNOSIS — Z47.89 UNSPECIFIED ORTHOPEDIC AFTERCARE: Primary | ICD-10-CM

## 2022-10-06 LAB
CRP SERPL-MCNC: 11.4 MG/L
ERYTHROCYTE [SEDIMENTATION RATE] IN BLOOD BY WESTERGREN METHOD: 43 MM/HR (ref 0–20)

## 2022-10-06 PROCEDURE — 36415 COLL VENOUS BLD VENIPUNCTURE: CPT

## 2022-10-06 PROCEDURE — 85652 RBC SED RATE AUTOMATED: CPT

## 2022-10-06 PROCEDURE — 86140 C-REACTIVE PROTEIN: CPT

## 2022-10-09 ENCOUNTER — HOSPITAL ENCOUNTER (INPATIENT)
Facility: CLINIC | Age: 87
LOS: 8 days | Discharge: HOME HEALTH | DRG: 871 | End: 2022-10-17
Attending: EMERGENCY MEDICINE | Admitting: INTERNAL MEDICINE
Payer: COMMERCIAL

## 2022-10-09 ENCOUNTER — APPOINTMENT (OUTPATIENT)
Dept: CT IMAGING | Facility: CLINIC | Age: 87
DRG: 871 | End: 2022-10-09
Attending: EMERGENCY MEDICINE
Payer: COMMERCIAL

## 2022-10-09 ENCOUNTER — HEALTH MAINTENANCE LETTER (OUTPATIENT)
Age: 87
End: 2022-10-09

## 2022-10-09 ENCOUNTER — APPOINTMENT (OUTPATIENT)
Dept: MRI IMAGING | Facility: CLINIC | Age: 87
DRG: 871 | End: 2022-10-09
Payer: COMMERCIAL

## 2022-10-09 ENCOUNTER — APPOINTMENT (OUTPATIENT)
Dept: GENERAL RADIOLOGY | Facility: CLINIC | Age: 87
DRG: 871 | End: 2022-10-09
Attending: EMERGENCY MEDICINE
Payer: COMMERCIAL

## 2022-10-09 DIAGNOSIS — J18.9 PNEUMONIA OF LEFT LOWER LOBE DUE TO INFECTIOUS ORGANISM: ICD-10-CM

## 2022-10-09 DIAGNOSIS — Z95.4 HISTORY OF AORTIC VALVE REPLACEMENT WITH TISSUE GRAFT: Primary | ICD-10-CM

## 2022-10-09 DIAGNOSIS — J18.9 PNEUMONIA OF LOWER LOBE DUE TO INFECTIOUS ORGANISM, UNSPECIFIED LATERALITY: ICD-10-CM

## 2022-10-09 DIAGNOSIS — R33.9 URINARY RETENTION: ICD-10-CM

## 2022-10-09 DIAGNOSIS — I50.32 CHRONIC DIASTOLIC CONGESTIVE HEART FAILURE (H): ICD-10-CM

## 2022-10-09 DIAGNOSIS — G93.40 ENCEPHALOPATHY: ICD-10-CM

## 2022-10-09 DIAGNOSIS — A41.9 SEPSIS (H): ICD-10-CM

## 2022-10-09 DIAGNOSIS — J44.9 CHRONIC OBSTRUCTIVE PULMONARY DISEASE, UNSPECIFIED COPD TYPE (H): ICD-10-CM

## 2022-10-09 LAB
ALBUMIN UR-MCNC: 20 MG/DL
ANION GAP SERPL CALCULATED.3IONS-SCNC: 2 MMOL/L (ref 3–14)
APPEARANCE UR: ABNORMAL
APTT PPP: 28 SECONDS (ref 22–38)
BASOPHILS # BLD AUTO: 0 10E3/UL (ref 0–0.2)
BASOPHILS NFR BLD AUTO: 0 %
BILIRUB UR QL STRIP: NEGATIVE
BUN SERPL-MCNC: 23 MG/DL (ref 7–30)
CALCIUM SERPL-MCNC: 7.6 MG/DL (ref 8.5–10.1)
CHLORIDE BLD-SCNC: 114 MMOL/L (ref 94–109)
CO2 SERPL-SCNC: 27 MMOL/L (ref 20–32)
COLOR UR AUTO: ABNORMAL
CREAT SERPL-MCNC: 1.57 MG/DL (ref 0.66–1.25)
EOSINOPHIL # BLD AUTO: 0 10E3/UL (ref 0–0.7)
EOSINOPHIL NFR BLD AUTO: 0 %
ERYTHROCYTE [DISTWIDTH] IN BLOOD BY AUTOMATED COUNT: 15.6 % (ref 10–15)
FLUAV RNA SPEC QL NAA+PROBE: NEGATIVE
FLUBV RNA RESP QL NAA+PROBE: NEGATIVE
GFR SERPL CREATININE-BSD FRML MDRD: 42 ML/MIN/1.73M2
GLUCOSE BLD-MCNC: 118 MG/DL (ref 70–99)
GLUCOSE UR STRIP-MCNC: NEGATIVE MG/DL
HCO3 BLDV-SCNC: 24 MMOL/L (ref 21–28)
HCT VFR BLD AUTO: 28.6 % (ref 40–53)
HGB BLD-MCNC: 8.5 G/DL (ref 13.3–17.7)
HGB UR QL STRIP: ABNORMAL
IMM GRANULOCYTES # BLD: 0.1 10E3/UL
IMM GRANULOCYTES NFR BLD: 1 %
INR PPP: 1.19 (ref 0.85–1.15)
KETONES UR STRIP-MCNC: NEGATIVE MG/DL
LACTATE BLD-SCNC: 1 MMOL/L
LEUKOCYTE ESTERASE UR QL STRIP: ABNORMAL
LYMPHOCYTES # BLD AUTO: 0.4 10E3/UL (ref 0.8–5.3)
LYMPHOCYTES NFR BLD AUTO: 2 %
MCH RBC QN AUTO: 28.8 PG (ref 26.5–33)
MCHC RBC AUTO-ENTMCNC: 29.7 G/DL (ref 31.5–36.5)
MCV RBC AUTO: 97 FL (ref 78–100)
MONOCYTES # BLD AUTO: 1.7 10E3/UL (ref 0–1.3)
MONOCYTES NFR BLD AUTO: 7 %
MUCOUS THREADS #/AREA URNS LPF: PRESENT /LPF
NEUTROPHILS # BLD AUTO: 23 10E3/UL (ref 1.6–8.3)
NEUTROPHILS NFR BLD AUTO: 90 %
NITRATE UR QL: NEGATIVE
NRBC # BLD AUTO: 0 10E3/UL
NRBC BLD AUTO-RTO: 0 /100
PCO2 BLDV: 47 MM HG (ref 40–50)
PH BLDV: 7.3 [PH] (ref 7.32–7.43)
PH UR STRIP: 6 [PH] (ref 5–7)
PLATELET # BLD AUTO: 194 10E3/UL (ref 150–450)
PO2 BLDV: 38 MM HG (ref 25–47)
POTASSIUM BLD-SCNC: 4 MMOL/L (ref 3.4–5.3)
PROCALCITONIN SERPL-MCNC: 7.49 NG/ML
RBC # BLD AUTO: 2.95 10E6/UL (ref 4.4–5.9)
RBC URINE: >182 /HPF
RSV RNA SPEC NAA+PROBE: NEGATIVE
SAO2 % BLDV: 66 % (ref 94–100)
SARS-COV-2 RNA RESP QL NAA+PROBE: NEGATIVE
SODIUM SERPL-SCNC: 143 MMOL/L (ref 133–144)
SP GR UR STRIP: 1.01 (ref 1–1.03)
UROBILINOGEN UR STRIP-MCNC: NORMAL MG/DL
WBC # BLD AUTO: 25.3 10E3/UL (ref 4–11)
WBC URINE: 21 /HPF

## 2022-10-09 PROCEDURE — 96367 TX/PROPH/DG ADDL SEQ IV INF: CPT

## 2022-10-09 PROCEDURE — 87086 URINE CULTURE/COLONY COUNT: CPT | Performed by: EMERGENCY MEDICINE

## 2022-10-09 PROCEDURE — 96361 HYDRATE IV INFUSION ADD-ON: CPT

## 2022-10-09 PROCEDURE — 80048 BASIC METABOLIC PNL TOTAL CA: CPT | Performed by: EMERGENCY MEDICINE

## 2022-10-09 PROCEDURE — 0042T CT HEAD PERFUSION W CONTRAST: CPT

## 2022-10-09 PROCEDURE — 74176 CT ABD & PELVIS W/O CONTRAST: CPT

## 2022-10-09 PROCEDURE — 93005 ELECTROCARDIOGRAM TRACING: CPT

## 2022-10-09 PROCEDURE — 70498 CT ANGIOGRAPHY NECK: CPT

## 2022-10-09 PROCEDURE — 71046 X-RAY EXAM CHEST 2 VIEWS: CPT

## 2022-10-09 PROCEDURE — 36415 COLL VENOUS BLD VENIPUNCTURE: CPT | Performed by: EMERGENCY MEDICINE

## 2022-10-09 PROCEDURE — 70496 CT ANGIOGRAPHY HEAD: CPT

## 2022-10-09 PROCEDURE — 250N000011 HC RX IP 250 OP 636: Performed by: EMERGENCY MEDICINE

## 2022-10-09 PROCEDURE — 70551 MRI BRAIN STEM W/O DYE: CPT

## 2022-10-09 PROCEDURE — 258N000003 HC RX IP 258 OP 636: Performed by: EMERGENCY MEDICINE

## 2022-10-09 PROCEDURE — 99291 CRITICAL CARE FIRST HOUR: CPT | Mod: GC | Performed by: PSYCHIATRY & NEUROLOGY

## 2022-10-09 PROCEDURE — 250N000009 HC RX 250: Performed by: EMERGENCY MEDICINE

## 2022-10-09 PROCEDURE — C9803 HOPD COVID-19 SPEC COLLECT: HCPCS

## 2022-10-09 PROCEDURE — 84145 PROCALCITONIN (PCT): CPT | Performed by: EMERGENCY MEDICINE

## 2022-10-09 PROCEDURE — 250N000013 HC RX MED GY IP 250 OP 250 PS 637: Performed by: INTERNAL MEDICINE

## 2022-10-09 PROCEDURE — 120N000001 HC R&B MED SURG/OB

## 2022-10-09 PROCEDURE — 85610 PROTHROMBIN TIME: CPT | Performed by: EMERGENCY MEDICINE

## 2022-10-09 PROCEDURE — 99223 1ST HOSP IP/OBS HIGH 75: CPT | Mod: AI | Performed by: INTERNAL MEDICINE

## 2022-10-09 PROCEDURE — 82803 BLOOD GASES ANY COMBINATION: CPT

## 2022-10-09 PROCEDURE — 96366 THER/PROPH/DIAG IV INF ADDON: CPT

## 2022-10-09 PROCEDURE — 81001 URINALYSIS AUTO W/SCOPE: CPT | Performed by: EMERGENCY MEDICINE

## 2022-10-09 PROCEDURE — 87040 BLOOD CULTURE FOR BACTERIA: CPT | Performed by: EMERGENCY MEDICINE

## 2022-10-09 PROCEDURE — 258N000003 HC RX IP 258 OP 636: Performed by: INTERNAL MEDICINE

## 2022-10-09 PROCEDURE — 99285 EMERGENCY DEPT VISIT HI MDM: CPT | Mod: 25

## 2022-10-09 PROCEDURE — 87637 SARSCOV2&INF A&B&RSV AMP PRB: CPT | Performed by: EMERGENCY MEDICINE

## 2022-10-09 PROCEDURE — 85025 COMPLETE CBC W/AUTO DIFF WBC: CPT | Performed by: EMERGENCY MEDICINE

## 2022-10-09 PROCEDURE — 85730 THROMBOPLASTIN TIME PARTIAL: CPT | Performed by: EMERGENCY MEDICINE

## 2022-10-09 PROCEDURE — 70450 CT HEAD/BRAIN W/O DYE: CPT

## 2022-10-09 PROCEDURE — 96365 THER/PROPH/DIAG IV INF INIT: CPT

## 2022-10-09 RX ORDER — OLANZAPINE 5 MG/1
5 TABLET ORAL 2 TIMES DAILY PRN
COMMUNITY
End: 2022-10-25

## 2022-10-09 RX ORDER — DOXYCYCLINE 100 MG/10ML
100 INJECTION, POWDER, LYOPHILIZED, FOR SOLUTION INTRAVENOUS EVERY 12 HOURS
Status: DISCONTINUED | OUTPATIENT
Start: 2022-10-09 | End: 2022-10-10

## 2022-10-09 RX ORDER — LIDOCAINE 40 MG/G
CREAM TOPICAL
Status: DISCONTINUED | OUTPATIENT
Start: 2022-10-09 | End: 2022-10-10

## 2022-10-09 RX ORDER — ONDANSETRON 2 MG/ML
4 INJECTION INTRAMUSCULAR; INTRAVENOUS EVERY 6 HOURS PRN
Status: DISCONTINUED | OUTPATIENT
Start: 2022-10-09 | End: 2022-10-10

## 2022-10-09 RX ORDER — ALBUTEROL SULFATE 0.83 MG/ML
2.5 SOLUTION RESPIRATORY (INHALATION) EVERY 4 HOURS PRN
Status: DISCONTINUED | OUTPATIENT
Start: 2022-10-09 | End: 2022-10-17 | Stop reason: HOSPADM

## 2022-10-09 RX ORDER — IOPAMIDOL 755 MG/ML
125 INJECTION, SOLUTION INTRAVASCULAR ONCE
Status: COMPLETED | OUTPATIENT
Start: 2022-10-09 | End: 2022-10-09

## 2022-10-09 RX ORDER — AMOXICILLIN 250 MG
2 CAPSULE ORAL 2 TIMES DAILY PRN
Status: DISCONTINUED | OUTPATIENT
Start: 2022-10-09 | End: 2022-10-17 | Stop reason: HOSPADM

## 2022-10-09 RX ORDER — DOXYCYCLINE 100 MG/10ML
100 INJECTION, POWDER, LYOPHILIZED, FOR SOLUTION INTRAVENOUS EVERY 12 HOURS
Status: DISCONTINUED | OUTPATIENT
Start: 2022-10-10 | End: 2022-10-11

## 2022-10-09 RX ORDER — AMOXICILLIN 250 MG
1 CAPSULE ORAL 2 TIMES DAILY PRN
Status: DISCONTINUED | OUTPATIENT
Start: 2022-10-09 | End: 2022-10-17 | Stop reason: HOSPADM

## 2022-10-09 RX ORDER — SODIUM CHLORIDE, SODIUM LACTATE, POTASSIUM CHLORIDE, CALCIUM CHLORIDE 600; 310; 30; 20 MG/100ML; MG/100ML; MG/100ML; MG/100ML
INJECTION, SOLUTION INTRAVENOUS CONTINUOUS
Status: DISCONTINUED | OUTPATIENT
Start: 2022-10-09 | End: 2022-10-11

## 2022-10-09 RX ORDER — ONDANSETRON 4 MG/1
4 TABLET, ORALLY DISINTEGRATING ORAL EVERY 6 HOURS PRN
Status: DISCONTINUED | OUTPATIENT
Start: 2022-10-09 | End: 2022-10-10

## 2022-10-09 RX ORDER — ASPIRIN 300 MG/1
300 SUPPOSITORY RECTAL ONCE
Status: COMPLETED | OUTPATIENT
Start: 2022-10-09 | End: 2022-10-09

## 2022-10-09 RX ORDER — ACETAMINOPHEN 500 MG
1000 TABLET ORAL 3 TIMES DAILY PRN
Status: DISCONTINUED | OUTPATIENT
Start: 2022-10-09 | End: 2022-10-17 | Stop reason: HOSPADM

## 2022-10-09 RX ADMIN — SODIUM CHLORIDE, POTASSIUM CHLORIDE, SODIUM LACTATE AND CALCIUM CHLORIDE 100 ML: 600; 310; 30; 20 INJECTION, SOLUTION INTRAVENOUS at 20:50

## 2022-10-09 RX ADMIN — ASPIRIN 300 MG: 300 SUPPOSITORY RECTAL at 14:50

## 2022-10-09 RX ADMIN — IOPAMIDOL 125 ML: 755 INJECTION, SOLUTION INTRAVENOUS at 13:56

## 2022-10-09 RX ADMIN — DOXYCYCLINE 100 MG: 100 INJECTION, POWDER, LYOPHILIZED, FOR SOLUTION INTRAVENOUS at 17:22

## 2022-10-09 RX ADMIN — SODIUM CHLORIDE 1000 ML: 9 INJECTION, SOLUTION INTRAVENOUS at 17:57

## 2022-10-09 RX ADMIN — SODIUM CHLORIDE 100 ML: 900 INJECTION INTRAVENOUS at 13:56

## 2022-10-09 RX ADMIN — CEFEPIME HYDROCHLORIDE 2 G: 2 INJECTION, POWDER, FOR SOLUTION INTRAVENOUS at 16:46

## 2022-10-09 ASSESSMENT — ACTIVITIES OF DAILY LIVING (ADL)
ADLS_ACUITY_SCORE: 35

## 2022-10-09 NOTE — CONSULTS
"[facility]    Stroke Consult Note    Reason for Consult: Stroke Code     Chief Complaint: Altered Mental Status      HPI  Hermilo Velasquez is a 89 year old male with vascular risk factors positive for HTN, A fib with hx of PE was on apixaban which was held for GI  bleed   . 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 Recent transient dysphagia that resolved.  Patient presented as tier 1 stroke code with encephalopathy, dysarthria nd difficulty following commands. LKN is last night not sure of exact time. Today mornin when wife was waking him up from bed, he was unable to speak and not able to open his eyes and speech was garbeled.  On arrival blood pressure was hypotensive 86/30.    Imaging Findings  CT head no LVO  CTA no evidence of LVO    Intravenous Thrombolysis  Not given due to:   - unclear or unfavorable risk-benefit profile for extended window thrombolysis beyond the conventional 4.5 hour time window    Endovascular Treatment  Not initiated due to absence of proximal vessel occlusion    Impression   Episode of acute encephalopathy upon waking up    LKN was last night. Wife woke patient up with evidence of confusion, not able to follow commands. In addition to garbe led speech. Patient noted to have hypotension upon arrival. Vessel imaging not concerning for athero.    Recommendations  Please obtain brain MRI wout contrast  Defer to primary team to optimize toxic metabolic risk factors  Started rectal aspirin 300 mg pending brain MRI          Thank you for this consult.      The Stroke Staff is Dr. Diaz.    Rema Tavares MD  Vascular Neurology Fellow  To page me or covering stroke neurology team member, click here: AMCOM   Choose \"On Call\" tab at top, then search dropdown box for \"Neurology Adult\", select location, press Enter, then look for stroke/neuro ICU/telestroke.    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 Vasques      Aortic stenosis     Severe AS, 9/2015 AVR - [facility] TRIFECTA Bovine bioprosthesis 25MM TF-25A     Atrial fibrillation (H)     9/2015 Paroxysmal post op Afib - discharged on Warfarin and a beta blocker     COPD (chronic obstructive pulmonary disease) (H)      Deep vein thrombosis (H)      GERD (gastroesophageal reflux disease)      Heart murmur      Monoclonal gammopathy     plasmacyte prominent causing monoclonal gammopathy     Need for SBE (subacute bacterial endocarditis) prophylaxis      Neuropathy      Other and unspecified hyperlipidemia      Other malignant lymphomas     non hodgkin's lymphoma     RBBB (right bundle branch block)      Severe sepsis with acute organ dysfunction (H) 11/16/2015     Unspecified hereditary and idiopathic peripheral neuropathy      Past Surgical History   Past Surgical History:   Procedure Laterality Date     APPENDECTOMY       ARTHROPLASTY KNEE Right 7/25/2022    Procedure: RIGHT TOTAL KNEE ARTHROPLASTY;  Surgeon: Giuliano Deshpande MD;  Location:  OR     AS TOTAL KNEE ARTHROPLASTY       BACK SURGERY       ESOPHAGOSCOPY, GASTROSCOPY, DUODENOSCOPY (EGD), COMBINED N/A 11/28/2015    Procedure: COMBINED ESOPHAGOSCOPY, GASTROSCOPY, DUODENOSCOPY (EGD);  Surgeon: Danis Castillo MD;  Location:  GI     ESOPHAGOSCOPY, GASTROSCOPY, DUODENOSCOPY (EGD), COMBINED N/A 7/26/2018    Procedure: COMBINED ESOPHAGOSCOPY, GASTROSCOPY, DUODENOSCOPY (EGD);;  Surgeon: Toby Dong DO;  Location:  GI     ESOPHAGOSCOPY, GASTROSCOPY, DUODENOSCOPY (EGD), COMBINED N/A 8/17/2020    Procedure: ESOPHAGOGASTRODUODENOSCOPY (EGD);  Surgeon: Herrera Henry MD;  Location:  GI     ESOPHAGOSCOPY, GASTROSCOPY, DUODENOSCOPY (EGD), COMBINED N/A 10/24/2020    Procedure: ESOPHAGOGASTRODUODENOSCOPY (EGD);  Surgeon: Vick Florentino MD;   Location:  GI     ESOPHAGOSCOPY, GASTROSCOPY, DUODENOSCOPY (EGD), COMBINED N/A 4/11/2022    Procedure: ESOPHAGOGASTRODUODENOSCOPY (EGD);  Surgeon: Vick Florentino MD;  Location:  GI     ESOPHAGOSCOPY, GASTROSCOPY, DUODENOSCOPY (EGD), COMBINED N/A 8/26/2022    Procedure: ESOPHAGOGASTRODUODENOSCOPY (EGD);  Surgeon: El Mcgovern MD;  Location:  GI     HERNIA REPAIR  2006     HERNIORRHAPHY VENTRAL  4/17/2013    Procedure: HERNIORRHAPHY VENTRAL;  VENTRAL HERNIA REPAIR WITH MESH;  Surgeon: Patel Guzman MD;  Location:  OR     KNEE SURGERY      arthroscopic right knee surgery      LOBECTOMY LUNG Right 3/26/2019    POSSIBLE LOBECTOMY LUNG per Dr. [facility]ques at [facility]     REPAIR LIGAMENT ANKLE  2/23/2012    Procedure:REPAIR LIGAMENT ANKLE; LEFT TARSAL TUNNEL RELEASE OF KNOT OF ARIEL RELEASE; Surgeon:SAUL PUENTE; Location: OR     REPLACE VALVE AORTIC N/A 9/3/2015    Procedure: REPLACE VALVE AORTIC;  Surgeon: Antonino Mitchell MD;  Location:  OR     ROTATOR CUFF REPAIR RT/LT      bilateral     SPINE SURGERY      3 spine surgeries     THORACOTOMY, WEDGE RESECTION LUNG, COMBINED Right 3/26/2019    RIGHT THORACOTOMY, WEDGE RESECTION, RIGHT LOWER LOBE LUNG NODULE,;  Surgeon: Jose A Vasques MD;  Location:  OR     TONSILLECTOMY       TURP       Medications   Home Meds  Prior to Admission medications    Medication Sig Start Date End Date Taking? Authorizing Provider   ACE/ARB/ARNI NOT PRESCRIBED (INTENTIONAL) Please choose reason not prescribed from choices below.  Patient not taking: No sig reported 3/12/22   Liv Mc MD   acetaminophen (TYLENOL) 500 MG tablet Take 1,000 mg by mouth 3 times daily as needed    Reported, Patient   albuterol (PROAIR HFA/PROVENTIL HFA/VENTOLIN HFA) 108 (90 Base) MCG/ACT inhaler Inhale 2 puffs into the lungs every 6 hours as needed    Reported, Patient   albuterol (PROVENTIL) (2.5 MG/3ML) 0.083% neb solution Take 1 vial (2.5 mg) by  nebulization every 6 hours as needed for shortness of breath / dyspnea or wheezing 6/1/22   Karson Bishop MD   alfuzosin ER (UROXATRAL) 10 MG 24 hr tablet Take 1 tablet (10 mg) by mouth daily 8/23/22   Karson Bishop MD   atorvastatin (LIPITOR) 10 MG tablet Take 1 tablet (10 mg) by mouth daily 6/1/22   Karson Bishop MD   benztropine (COGENTIN) 1 MG tablet Take 1 tablet (1 mg) by mouth 3 times daily as needed for other (for extrapyramidal effects) 9/8/22   Loni Yuen MD   dimenhyDRINATE (DRAMAMINE) 50 MG tablet Take 50 mg by mouth as needed    Reported, Patient   DULoxetine (CYMBALTA) 60 MG capsule Take 1 capsule daily for 7 days then take 2 capsules daily    Unknown, Entered By History   famotidine (PEPCID) 40 MG tablet Take 40 mg by mouth 2 times daily    Unknown, Entered By History   ferrous sulfate (FE TABS) 325 (65 Fe) MG EC tablet Take 325 mg by mouth 2 times daily    Reported, Patient   fluticasone-vilanterol (BREO ELLIPTA) 200-25 MCG/INH inhaler Inhale 1 puff into the lungs daily    Unknown, Entered By History   gabapentin (NEURONTIN) 100 MG capsule Take 2 capsules (200 mg) by mouth At Bedtime 9/8/22   Loni Yuen MD   metoprolol tartrate (LOPRESSOR) 25 MG tablet Take 1 tablet (25 mg) by mouth 2 times daily 9/8/22   Loni Yuen MD   nystatin (MYCOSTATIN) 181421 UNIT/GM external powder Apply to groin/abdomen folds topically two times a day for  redness until healed then D/C    Reported, Patient   polyethylene glycol (MIRALAX) 17 g packet Take 17 g by mouth daily 7/25/22   Giuliano Deshpande MD   senna-docusate (SENOKOT-S/PERICOLACE) 8.6-50 MG tablet Take 1 tablet by mouth 2 times daily as needed for constipation Take while on oral narcotics to prevent or treat constipation. 7/28/22   Soila Rivera PA-C   tiotropium (SPIRIVA) 18 MCG inhaled capsule Inhale 18 mcg into the lungs daily    Unknown, Entered By History   triamcinolone (KENALOG) 0.1 % external cream Apply topically 2 times  "daily as needed for irritation    Unknown, Entered By History       Scheduled Meds    iopamidol (ISOVUE-370)  125 mL Intravenous Once     sodium chloride 0.9 %  100 mL As instructed Once       Infusion Meds      PRN Meds      Allergies   Allergies   Allergen Reactions     Omeprazole Itching     Pantoprazole Itching     Prevacid [Lansoprazole] Itching     Lasix [Furosemide] Rash     Lidocaine Blisters and Rash     Allergy to lidocaine ointment  Allergy to lidocaine ointment       Penicillin G Rash     Penicillins Rash     \"broke out from injection\" 60 yrs ago  Tolerates cephalosporins     Family History   Family History   Problem Relation Age of Onset     C.A.D. Father      Emphysema Father      Melanoma No family hx of      Skin Cancer No family hx of      Social History   Social History     Tobacco Use     Smoking status: Former     Packs/day: 2.00     Years: 55.00     Pack years: 110.00     Types: Cigarettes     Quit date: 1998     Years since quittin.7     Smokeless tobacco: Never   Vaping Use     Vaping Use: Never used   Substance Use Topics     Alcohol use: Not Currently     Drug use: No       Review of Systems   Review of systems not obtained due to patient factors - confusion       PHYSICAL EXAMINATION  Temp:  [99.7  F (37.6  C)] 99.7  F (37.6  C)  Pulse:  [93] 93  Resp:  [20] 20  BP: (86)/(30) 86/30  SpO2:  [100 %] 100 %     Neurologic  Mental Status:  Drowsy needs repeated stimuli to wake up  Cranial Nerves:  visual fields intact, PERRL, EOMI with normal smooth pursuit, facial sensation intact and symmetric, facial movements symmetric, hearing not formally tested but intact to conversation, palate elevation symmetric and uvula midline, no dysarthria, shoulder shrug strong bilaterally, tongue protrusion midline  Motor:  Spontaneous RUE movement antigravity  No movement in LUE  Withdrawal from pain in both legs  Reflexes:  toes down-going  Sensory: Responds to painful stimuli  Coordination:  Cannot " be obtained  Station/Gait:  deferred    Dysphagia Screen  Per Nursing    Stroke Scales    NIHSS  1a. Level of Consciousness 2-->Not alert, requires repeated stimulation to attend, or is obtunded and requires strong or painful stimulation to make movements (not stereotyped)   1b. LOC Questions 2-->Answers neither question correctly   1c. LOC Commands 2-->Performs neither task correctly   2.   Best Gaze 0-->Normal   3.   Visual 0-->No visual loss   4.   Facial Palsy 0-->Normal symmetrical movements   5a. Motor Arm, Left 2-->Some effort against gravity, limb cannot get to or maintain (if cued) 90 (or 45) degrees, drifts down to bed, but has some effort against gravity   5b. Motor Arm, Right 0-->No drift, limb holds 90 (or 45) degrees for full 10 secs   6a. Motor Leg, Left 2-->Some effort against gravity, leg falls to bed by 5 secs, but has some effort against gravity   6b. Motor Leg, right 2-->Some effort against gravity, leg falls to bed by 5 secs, but has some effort against gravity   7.   Limb Ataxia 0-->Absent   8.   Sensory 0-->Normal, no sensory loss   9.   Best Language 2-->Severe aphasia, all communication is through fragmentary expression, great need for inference, questioning, and guessing by the listener. Range of information that can be exchanged is limited, listener carries burden of. . . (see row details)   10. Dysarthria 2-->Severe dysarthria, patients speech is so slurred as to be unintelligible in the absence of or out of proportion to any dysphasia, or is mute/anarthric   11. Extinction and Inattention  0-->No abnormality   Total 16 (10/09/22 2966)           Imaging  I personally reviewed all imaging; relevant findings per HPI.     Lab Results Data   CBC  No results for input(s): WBC, RBC, HGB, HCT, PLT in the last 168 hours.  Basic Metabolic Panel    No results for input(s): NA, POTASSIUM, CHLORIDE, CO2, BUN, CR, GLC, AMRITA in the last 168 hours.  Liver Panel  No results for input(s): PROTTOTAL,  ALBUMIN, BILITOTAL, ALKPHOS, AST, ALT, BILIDIRECT in the last 168 hours.  INR    Recent Labs   Lab Test 08/23/22  1712 05/08/22  1223 10/23/20  1617   INR 1.65* 1.99* 1.39*      Lipid Profile    Recent Labs   Lab Test 11/30/21  1123 06/18/20  1144 02/13/17  0956   CHOL 138 107 126   HDL 55 32* 44   LDL 71 53 69   TRIG 58 109 67     A1C    Recent Labs   Lab Test 07/31/18  1104 09/04/15  0407 07/30/15  1551   A1C 5.8* 5.5 5.8     Troponin  No results for input(s): CTROPT, TROPONINIS, TROPONINI, GHTROP in the last 168 hours.       Stroke Code Data Data   Stroke Code Data  (for stroke code without tele)  Stroke code activated 10/09/22   1342   First stroke provider response 10/09/22   1344   Last known normal 10/08/22   2100   Time of discovery   (or onset of symptoms) 10/09/22    (upon waking up between 7 and 9)   Head CT read by Stroke Neuro Dr/Provider 10/09/22   1400   Was stroke code de-escalated? Yes 10/09/22 140

## 2022-10-09 NOTE — ED NOTES
Pt has ax infusing, BP soft MD aware. 14F rodriguez inserted to full length and no urine return. Dk blood in catheter, pt resistant to same and tried pulling away. Md made aware. Pt resting supine wearing oxymask.

## 2022-10-09 NOTE — ED TRIAGE NOTES
Pt last known normal according to wife was 930pm last night, pt woke up this am nonverbal, confused, sleepy.      Triage Assessment     Row Name 10/09/22 3305       Triage Assessment (Adult)    Airway WDL WDL       Respiratory WDL    Respiratory WDL cough       Skin Circulation/Temperature WDL    Skin Circulation/Temperature WDL WDL       Cardiac WDL    Cardiac WDL WDL       Cognitive/Neuro/Behavioral WDL    Cognitive/Neuro/Behavioral WDL all    Level of Consciousness confused    Arousal Level arouses to voice    Orientation disoriented x 4

## 2022-10-09 NOTE — ED PROVIDER NOTES
"  History   Chief Complaint:  Altered Mental Status     The history is provided by the EMS personnel. History limited by: Altered mental status.      Hermilo Velasquez is a 89 year old male with history of non-hodgkin's lymphoma, atrial fibrillation, hypertension, hyperlipidemia, DVT/PE, COPD, and CKD who presents via EMS from home with altered mental status and word finding difficulty, with last known well time at 2130 last night, approximately 16 hours ago. Upon waking this morning, the patient's wife found him bed \"unable to speak, unable to open his eyes,\" and with garbled speech. EMS was called and transported him to ED today. En route, he was tachycardic with stable blood pressure, and a 20 gauge was placed in his left AC. Pupils were round, equal, and reactive and bgl measured 141. The patient's RR was 20 and end tital was 35. EMS noted not obvious weakness. Presently, he is unable to provide history. He is noted by EMS (and later family) to have a large swelling to his back present for the past 2 weeks, located near an old surgical scar. The patient does have cardiac history including previous CABG and was anticoagulated 3 weeks ago for GI bleed. He has other history of hypertension. Per EMS, the patient additionally has some unspecified deformity on his back, for which he has not yet been seen.    Review of Systems   Unable to perform ROS: Mental status change     Allergies:  Omeprazole  Pantoprazole  Prevacid [Lansoprazole]  Lasix [Furosemide]  Lidocaine  Penicillin G  Penicillins    Medications:  Albuterol  Aspirin  Alfuzosin  Atorvastatin  Benztropine  Dimenhydrinate  Celebrex  Duloxetine  Famotidine  Ferrous sulfate  Breo Ellipta  Gabapentin  Metoprolol tartrate  Nystatin  Polyethylene glycol  Senna docusate  Tiotropium    Past Medical History:     Non-rheumatic aortic valve stenosis  GERD  Non-Hodgkin's lymphoma  Anemia  Paroxysmal atrial fibrillation  Pulmonary nodules  Lobar pneumonia  Essential " hypertension  Hyperlipidemia  COPD  Spongiotic dermatitis  Malignant neoplasm of lung  Bullous pemphigoid  Centrilobular emphysema  Heart murmur, systolic  Aortic stenosis  Sepsis  DVT  Peripheral neuropathy  Acute kidney injury  CKD stage 3  Gastrointestinal hemorrhage  Pneumonia  PE  CHF  Hemarthrosis  Drusen of macula, bilateral  Lumbar post-laminectomy syndrome    Past Surgical History:    Appendectomy  Arthroplasty knee, right  Total knee arthroplasty  EGD, combined, x6  IVC filter placement  Hernia repair  Herniorrhaphy ventral  Right knee arthroscopic  Lobectomy lung, right  Repair ligament ankle  Replace aortic valve  Bilateral rotator cuff repair  3 spine surgeries  Thoracotomy, wedge resection lung, combined, right  Tonsillectomy  TURP    Family History:    CAD  Emphysema    Social History:  The patient presents to the ED alone.  The patient arrived via EMS.  PCP: Karson Bishop     Physical Exam     Patient Vitals for the past 24 hrs:   BP Temp Pulse Resp SpO2 Weight   10/09/22 2050 90/50 97.8  F (36.6  C) 75 23 98 % --   10/09/22 2044 (!) 89/44 -- 76 19 96 % --   10/09/22 1910 91/45 -- 76 16 96 % --   10/09/22 1850 94/49 -- 77 -- -- --   10/09/22 1800 92/48 -- 84 24 98 % --   10/09/22 1750 (!) 78/48 -- 82 18 97 % --   10/09/22 1730 (!) 89/49 -- 85 26 97 % --   10/09/22 1720 (!) 83/52 -- 86 21 96 % --   10/09/22 1700 (!) 89/48 -- 89 18 95 % --   10/09/22 1650 (!) 85/44 -- 89 15 92 % --   10/09/22 1640 94/48 -- 90 18 94 % --   10/09/22 1630 (!) 79/49 -- (!) 126 17 92 % --   10/09/22 1610 90/50 99.1  F (37.3  C) 89 19 96 % --   10/09/22 1545 97/49 -- 95 19 94 % --   10/09/22 1530 99/52 -- 93 18 96 % --   10/09/22 1520 99/48 -- 92 21 98 % --   10/09/22 1431 107/52 -- 96 25 99 % --   10/09/22 1416 97/57 -- (!) 128 (!) 34 96 % --   10/09/22 1411 (!) 85/55 -- (!) 130 (!) 34 (!) 84 % --   10/09/22 1406 103/49 -- 93 (!) 0 97 % --   10/09/22 1338 (!) 86/30 99.7  F (37.6  C) 93 20 100 % 70.8 kg (156 lb 1.4 oz)      Physical Exam  Nursing note and vitals reviewed.  General: Appears well-developed and well-nourished.   Head: No signs of trauma.   Mouth/Throat: Oropharynx is clear and moist.   Eyes: Conjunctivae are normal. Pupils are equal, round, and reactive to light.   Neck: Normal range of motion. No nuchal rigidity.   Cardiovascular: Normal rate and regular rhythm.    Respiratory: Effort normal and breath sounds normal. No respiratory distress.   Abdominal: Soft. There is no tenderness. There is no guarding. Midline surgical incision from the umbilicus up to his sternum.  Musculoskeletal: Normal range of motion. 5 cm of swelling over his left flank and black.  Neurological: Slurred speech, word finding difficulty, face was symmetric. Seemed to be moving all 5 extremities equally.  Skin: Skin is warm and dry. No rash noted.   Psychiatric: normal mood and affect. behavior is normal.     Emergency Department Course   ECG  ECG taken at 1413, ECG read at 1450  Sinus rhythm.  Right bundle branch block.  Left anterior fascicular block.  Bifascicular block.  Abnormal ECG.  No significant change as compared to prior EKG dated 8/23/22   Rate 128 bpm. MA interval * ms. QRS duration 138 ms. QT/QTc 352/513 ms. P-R-T axis * -48 66.     Imaging:  MR Brain w/o Contrast   Final Result   IMPRESSION:   1.  No acute intracranial process.   2.  Generalized brain atrophy and presumed microvascular ischemic changes as detailed above.      CT Abdomen Pelvis w/o Contrast   Final Result   IMPRESSION:    1.  Sequela of chronic bladder outlet obstruction without hydronephrosis. Nonspecific perinephric stranding. Recommend correlation with urinalysis.   2.  No additional infectious source visualized in the abdomen or pelvis.   3.  Partially visualized airspace disease in the right lung with small right pleural effusion with pleural thickening and enhancement, suggestive of exudative effusion, developing empyema is possible.         XR Chest 2 Views    Final Result   IMPRESSION: Patchy opacifications throughout the right hemithorax have clearly worsened since previous exam, especially the right upper lobe. Findings concerning for pneumonia, perhaps with superimposed chronic pulmonary disease. Heart size is normal.    Subtle opacities in the left lung are relatively unchanged. Median sternotomy wires, unchanged.         CT Head Perfusion w Contrast   Final Result      CTA Head Neck w Contrast   Final Result   IMPRESSION:    HEAD CT:   1.  Described separately.      HEAD CTA:    1.  No hemodynamic stenosis. No evidence for large vessel occlusion, dissection or aneurysm. Satisfactory peripheral branch arborization YASHIRA, MCA and PCA vascular territory. No pathologic enhancement. Dural venous sinus patterns of enhancement are    normal.      NECK CTA:   1.  No high-grade stenosis or dissection. Mild narrowing ICA origins bilaterally.      2.  Infiltrate right mid and upper lung noted.      3.  See above for details and description.      4.  Discussed with Dr. Montiel at 2:16 PM on 10/09/2022.         CT Head w/o Contrast   Final Result   IMPRESSION:   1.  No CT evidence for acute intracranial process.      2.  Brain atrophy and presumed chronic microvascular ischemic changes as above.      3.  Nothing for acute or evolving infarction. No hyperdense hemorrhage.      4.  Stable intracranial appearance from 08/27/2022.      5.  Results discussed with referring clinician Dr. Montiel on 10/09/2022 at 2:00 PM.           Report per radiology    Laboratory:  Labs Ordered and Resulted from Time of ED Arrival to Time of ED Departure   BASIC METABOLIC PANEL - Abnormal       Result Value    Sodium 143      Potassium 4.0      Chloride 114 (*)     Carbon Dioxide (CO2) 27      Anion Gap 2 (*)     Urea Nitrogen 23      Creatinine 1.57 (*)     Calcium 7.6 (*)     Glucose 118 (*)     GFR Estimate 42 (*)    INR - Abnormal    INR 1.19 (*)    CBC WITH PLATELETS AND DIFFERENTIAL - Abnormal     WBC Count 25.3 (*)     RBC Count 2.95 (*)     Hemoglobin 8.5 (*)     Hematocrit 28.6 (*)     MCV 97      MCH 28.8      MCHC 29.7 (*)     RDW 15.6 (*)     Platelet Count 194      % Neutrophils 90      % Lymphocytes 2      % Monocytes 7      % Eosinophils 0      % Basophils 0      % Immature Granulocytes 1      NRBCs per 100 WBC 0      Absolute Neutrophils 23.0 (*)     Absolute Lymphocytes 0.4 (*)     Absolute Monocytes 1.7 (*)     Absolute Eosinophils 0.0      Absolute Basophils 0.0      Absolute Immature Granulocytes 0.1      Absolute NRBCs 0.0     ISTAT GASES LACTATE VENOUS POCT - Abnormal    Lactic Acid POCT 1.0      Bicarbonate Venous POCT 24      O2 Sat, Venous POCT 66 (*)     pCO2V Venous POCT 47      pH Venous POCT 7.30 (*)     pO2 Venous POCT 38     PROCALCITONIN - Abnormal    Procalcitonin 7.49 (*)    ROUTINE UA WITH MICROSCOPIC REFLEX TO CULTURE - Abnormal    Color Urine Light Brown (*)     Appearance Urine Slightly Cloudy (*)     Glucose Urine Negative      Bilirubin Urine Negative      Ketones Urine Negative      Specific Gravity Urine 1.015      Blood Urine Large (*)     pH Urine 6.0      Protein Albumin Urine 20 (*)     Urobilinogen Urine Normal      Nitrite Urine Negative      Leukocyte Esterase Urine Small (*)     Mucus Urine Present (*)     RBC Urine >182 (*)     WBC Urine 21 (*)    PARTIAL THROMBOPLASTIN TIME - Normal    aPTT 28     INFLUENZA A/B & SARS-COV2 PCR MULTIPLEX - Normal    Influenza A PCR Negative      Influenza B PCR Negative      RSV PCR Negative      SARS CoV2 PCR Negative     URINE CULTURE   BLOOD CULTURE   BLOOD CULTURE      Emergency Department Course:      Reviewed:  I reviewed nursing notes, vitals, past medical history and Care Everywhere.    Assessments and Consults:  1335 I obtained history and examined the patient as noted above.   1342 I called for Tier 2 code stroke.  1344    I consulted with stroke neurology regarding the patient's history and presentation here in the  emergency department.  1408 I spoke with radiology.  1536 I spoke with stroke neurology.  1602 I rechecked the patient and explained findings.  1724 I rechecked the patient and explained findings.  1842 I consulted with Dr. Kelly, hospitalist, regarding the patient's history and presentation here in the emergency department who accepted the patient for admission.  1858 I rechecked the patient and explained findings. I discussed plan for admission and all are in agreement.    Interventions:  1450  mg Rectal  1646 Maxipime 2 g IV  1722 Vibramycin 100 mg IV  1757 NS 1 L IV    Disposition:  The patient was admitted to the hospital under the care of Dr. Kelly.     Impression & Plan     Medical Decision Making:  Hermilo Velasquez is a 89 year old male who presents for evaluation of altered mental status and word finding difficulty. Code stroke originally called due to suspicion of neurological cause, however imaging showed no evidence of acute intracranial process. Metabolic or infectious etiology considered, and history, physical exam, imaging, and laboratory studies are consistent with pneumonia without severe sepsis. First dose of antibiotics given in ED within 2 hours of diagnosis. There are no signs of complications of pneumonia at this point such as septic shock, bacteremia, empyema, hypoxia, respiratory failure or compromise.  This could be considered hospital acquired pneumonia but there are no risk factors at this point for coverage of antibiotic-resistant strains.     I doubt PE, dissection, acute coronary syndrome, or other worrisome etiology for patients symptoms. Would not cover for aspiration or antibiotic resistant strains of pneumonia at this point. Given evidence of encephalopathy with pneumonia, admit to medicine for further cares.    Diagnosis:    ICD-10-CM    1. Encephalopathy  G93.40       2. Pneumonia of lower lobe due to infectious organism, unspecified laterality  J18.9         [place]  Disclosure:  I, Linda Lorajose, am serving as a scribe at 1:35 PM on 10/9/2022 to document services personally performed by Emery Montiel MD based on my observations and the provider's statements to me.     Emery Montiel MD  10/09/22 2646

## 2022-10-09 NOTE — ED NOTES
"Ed   [facility]  ED Nurse Handoff Report    ED Chief complaint: Altered Mental Status      ED Diagnosis: PNA/ Confusion  Final diagnoses:   Encephalopathy   Pneumonia of lower lobe due to infectious organism, unspecified laterality       Code Status: Discuss with admit MD    Allergies:   Allergies   Allergen Reactions     Omeprazole Itching     Pantoprazole Itching     Prevacid [Lansoprazole] Itching     Lasix [Furosemide] Rash     Lidocaine Blisters and Rash     Allergy to lidocaine ointment  Allergy to lidocaine ointment       Penicillin G Rash     Penicillins Rash     \"broke out from injection\" 60 yrs ago  Tolerates cephalosporins       Patient Story: Pt wife said last normal 930 pm last night. Confused not himself.R/O stroke on arrival. Non verbal.  Focused Assessment:  GCS 8, non vberbal, rouses to pain, doesn't follow commands.  Lungs: crackles bilaterally r>L on 5Loxymask. Sob. Unsure RA sats. Has RLL PNA.  CVS: soft bp in 80s. No cp. s1 s2 A=r no periph edema.   Abdo vomitted yesterday, no V or D.  : hx of bladder issues. Has rodriguez draining red toinged urine with clots.    Treatments and/or interventions provided:   Results for orders placed or performed during the hospital encounter of 10/09/22   CT Head w/o Contrast     Status: None    Narrative    EXAM: CT HEAD WITHOUT CONTRAST  LOCATION: [facility]  DATE/TIME: 10/09/2022, 1:54 PM    INDICATION: Altered mental status.  COMPARISON: 08/27/2022.  TECHNIQUE: Routine CT Head without IV contrast. Multiplanar reformats. Dose reduction techniques were used.    FINDINGS:  INTRACRANIAL CONTENTS: No acute intracranial hemorrhage. Stable prominence of CSF bifrontal level may reflect atrophy or chronic subdural fluid collections, unchanged from prior study. No CT evidence of acute infarct. Mild to moderate presumed chronic   small vessel ischemic changes. Moderate generalized volume loss. No hydrocephalus. Cerebellar " tonsillar position is satisfactory. Vascular calcification. Sella is normal. Corpus callosum is intact.     VISUALIZED ORBITS/SINUSES/MASTOIDS: Prior bilateral cataract surgery. Visualized portions of the orbits are otherwise unremarkable. Secretions and partial opacification right maxillary sinus with osteitis suggests acute on chronic sinonasal inflammatory   changes. Membrane thickening elsewhere ethmoid air cells. Polypoid membrane thickening floor of the left maxillary sinus. Scattered fluid/membrane thickening in the left mastoid air cells. No apparent mass in the posterior nasopharynx or skull base.    BONES/SOFT TISSUES: No evidence for fracture of the calvarium or skull base. Mild degenerative changes both TMJs. Mild demineralization. No swelling of the facial or scalp tissues.      Impression    IMPRESSION:  1.  No CT evidence for acute intracranial process.    2.  Brain atrophy and presumed chronic microvascular ischemic changes as above.    3.  Nothing for acute or evolving infarction. No hyperdense hemorrhage.    4.  Stable intracranial appearance from 08/27/2022.    5.  Results discussed with referring clinician Dr. Montiel on 10/09/2022 at 2:00 PM.     CTA Head Neck w Contrast     Status: None    Narrative    EXAM: CTA HEAD NECK WITH CONTRAST  LOCATION: [facility]  DATE/TIME: 10/09/2022, 1:56 PM    INDICATION: Altered mental status.  COMPARISON: Head CT 10/09/2022.  CONTRAST: 75 mL Isovue 370.  TECHNIQUE: Head and neck CT angiogram with IV contrast. Noncontrast head CT followed by axial helical CT images of the head and neck vessels obtained during the arterial phase of intravenous contrast administration. Axial 2D reconstructed images and   multiplanar 3D MIP reconstructed images of the head and neck vessels were performed by the technologist. Dose reduction techniques were used. All stenosis measurements made according to NASCET criteria unless otherwise  specified.    FINDINGS:     NONCONTRAST HEAD CT:   Described separately.    HEAD CTA:  ANTERIOR CIRCULATION: No stenosis/occlusion, aneurysm, or high-flow vascular malformation. Standard [place] of [place] anatomy. Satisfactory peripheral branch arborization of the YASHIRA and MCA vascular territories. Nonstenotic calcification of the cavernous   ICA segments.    POSTERIOR CIRCULATION: No stenosis/occlusion, aneurysm, or high-flow vascular malformation. Balanced vertebral arteries supply a normal basilar artery. Satisfactory peripheral branch arborization PCA vascular territory. Mild ectasia at the basilar tip   series 13 image 49.    DURAL VENOUS SINUSES: Expected enhancement of the major dural venous sinuses.    NECK CTA:  RIGHT CAROTID: Calcification at the bifurcation and proximal right ICA with mild proximal right ICA stenosis probably 20-30% in degree. No high-grade or tandem stenosis or dissection.    LEFT CAROTID: Nonstenotic calcification mid/distal left CCA eccentrically. Calcification at the bifurcation and proximal left ICA with mild to moderate proximal left ICA narrowing 30-50% series 10 image 261. No additional or tandem ICA stenosis is   present. No dissection.    VERTEBRAL ARTERIES: No focal stenosis or dissection. Balanced vertebral arteries.    AORTIC ARCH: Classic aortic arch anatomy with no significant stenosis at the origin of the great vessels. Tortuosity without stenosis of the right innominate arterial origin and proximal subclavian arteries bilaterally.    NONVASCULAR STRUCTURES: No pathologic enhancement is evident intracranially. No pathologic orbital enhancement with procedural changes both globes. Partial opacification paranasal sinuses. No evidence for mass or adenopathy within the neck soft tissues.   Multilevel degenerative cervical spondylosis. No severe spinal stenosis is present with a few levels of mild to moderate foraminal compromise. Asymmetric coarsened infiltrate throughout much  of the right mid and upper lung with some emphysematous changes   left lung. Correlate for unilateral right-sided pneumonitis. Possible area of focal infiltrate or volume of the azygos at the right retrotracheal level. Sternotomy. Patent airway. Satisfactory cervical prevertebral soft tissues.      Impression    IMPRESSION:   HEAD CT:  1.  Described separately.    HEAD CTA:   1.  No hemodynamic stenosis. No evidence for large vessel occlusion, dissection or aneurysm. Satisfactory peripheral branch arborization YASHIRA, MCA and PCA vascular territory. No pathologic enhancement. Dural venous sinus patterns of enhancement are   normal.    NECK CTA:  1.  No high-grade stenosis or dissection. Mild narrowing ICA origins bilaterally.    2.  Infiltrate right mid and upper lung noted.    3.  See above for details and description.    4.  Discussed with Dr. Montiel at 2:16 PM on 10/09/2022.     CT Head Perfusion w Contrast     Status: None    Narrative    EXAM: CT HEAD PERFUSION W CONTRAST  LOCATION: [facility]  DATE/TIME: 10/9/2022 2:15 PM    INDICATION: Altered mental status  COMPARISON: CT/CT angiogram head and neck performed today  TECHNIQUE: CT cerebral perfusion was performed utilizing a second contrast bolus. Perfusion data were post processed with generation of standard perfusion maps and estimation of ischemic/infarcted volumes utilizing standard threshold values. Dose   reduction techniques were used. All stenosis measurements made according to NASCET criteria unless otherwise specified.  CONTRAST: 50mL Isovue 370  COMPARISON: None.    FINDINGS:     CT PERFUSION:  PERFUSION MAPS: Symmetrical cerebral perfusion. No focal deficits in cerebral blood flow or volume to suggest ischemia/oligemia.    RAPID ANALYSIS:  CBF<30%: 0 mL  Tmax>6sec: 0 mL  Mismatch volume: 0 mL  Mismatch ratio: None    CT PERFUSION:  1.  Symmetric cerebral perfusion.. No focal perfusion defects or cerebral blood flow/volume are  noted.   XR Chest 2 Views     Status: None    Narrative    EXAM: XR CHEST 2 VIEWS  LOCATION: [facility]  DATE/TIME: 10/09/2022, 4:02 PM    INDICATION: AMS.  COMPARISON: 08/27/2022.      Impression    IMPRESSION: Patchy opacifications throughout the right hemithorax have clearly worsened since previous exam, especially the right upper lobe. Findings concerning for pneumonia, perhaps with superimposed chronic pulmonary disease. Heart size is normal.   Subtle opacities in the left lung are relatively unchanged. Median sternotomy wires, unchanged.     CT Abdomen Pelvis w/o Contrast     Status: None    Narrative    EXAM: CT ABDOMEN PELVIS W/O CONTRAST  LOCATION: [facility]  DATE/TIME: 10/9/2022 4:29 PM    INDICATION: fever, ams  COMPARISON: Same day chest radiograph, CT 04/10/2022, 08/16/2020  TECHNIQUE: CT scan of the abdomen and pelvis was performed without IV contrast. Multiplanar reformats were obtained. Dose reduction techniques were used.  CONTRAST: None.    FINDINGS:   LOWER CHEST: Partially visualized airspace disease in the right lower and middle lobes. There is a small right pleural effusion with thickened and enhancing pleura noted (series 2 image 32).    HEPATOBILIARY: Multiple small well-circumscribed hypodense hepatic lesions, likely cysts, present to some degree since at least 2011, no specific follow-up recommended.    PANCREAS: Normal.    SPLEEN: Normal.    ADRENAL GLANDS: Normal.    KIDNEYS/BLADDER: Persistent nephrograms with excreted contrast in the renal collecting system bilaterally. Nonspecific perinephric stranding. No hydronephrosis. Urinary bladder is distended with mild wall thickening.    BOWEL: No obstruction or inflammatory change.    LYMPH NODES: Normal.    VASCULATURE: Unchanged saccular infrarenal abdominal aortic aneurysm measuring up to 3.9 cm (series 2 image 124). Severe calcified atherosclerosis.    PELVIC ORGANS: Prostatomegaly with  likely prior TURP.    MUSCULOSKELETAL: No acute bony abnormality. Healed right rib fractures. Mild body wall edema.        Impression    IMPRESSION:   1.  Sequela of chronic bladder outlet obstruction without hydronephrosis. Nonspecific perinephric stranding. Recommend correlation with urinalysis.  2.  No additional infectious source visualized in the abdomen or pelvis.  3.  Partially visualized airspace disease in the right lung with small right pleural effusion with pleural thickening and enhancement, suggestive of exudative effusion, developing empyema is possible.     Basic metabolic panel     Status: Abnormal   Result Value Ref Range    Sodium 143 133 - 144 mmol/L    Potassium 4.0 3.4 - 5.3 mmol/L    Chloride 114 (H) 94 - 109 mmol/L    Carbon Dioxide (CO2) 27 20 - 32 mmol/L    Anion Gap 2 (L) 3 - 14 mmol/L    Urea Nitrogen 23 7 - 30 mg/dL    Creatinine 1.57 (H) 0.66 - 1.25 mg/dL    Calcium 7.6 (L) 8.5 - 10.1 mg/dL    Glucose 118 (H) 70 - 99 mg/dL    GFR Estimate 42 (L) >60 mL/min/1.73m2   Partial thromboplastin time     Status: Normal   Result Value Ref Range    aPTT 28 22 - 38 Seconds   INR     Status: Abnormal   Result Value Ref Range    INR 1.19 (H) 0.85 - 1.15   CBC with platelets and differential     Status: Abnormal   Result Value Ref Range    WBC Count 25.3 (H) 4.0 - 11.0 10e3/uL    RBC Count 2.95 (L) 4.40 - 5.90 10e6/uL    Hemoglobin 8.5 (L) 13.3 - 17.7 g/dL    Hematocrit 28.6 (L) 40.0 - 53.0 %    MCV 97 78 - 100 fL    MCH 28.8 26.5 - 33.0 pg    MCHC 29.7 (L) 31.5 - 36.5 g/dL    RDW 15.6 (H) 10.0 - 15.0 %    Platelet Count 194 150 - 450 10e3/uL    % Neutrophils 90 %    % Lymphocytes 2 %    % Monocytes 7 %    % Eosinophils 0 %    % Basophils 0 %    % Immature Granulocytes 1 %    NRBCs per 100 WBC 0 <1 /100    Absolute Neutrophils 23.0 (H) 1.6 - 8.3 10e3/uL    Absolute Lymphocytes 0.4 (L) 0.8 - 5.3 10e3/uL    Absolute Monocytes 1.7 (H) 0.0 - 1.3 10e3/uL    Absolute Eosinophils 0.0 0.0 - 0.7 10e3/uL     Absolute Basophils 0.0 0.0 - 0.2 10e3/uL    Absolute Immature Granulocytes 0.1 <=0.4 10e3/uL    Absolute NRBCs 0.0 10e3/uL   iStat Gases (lactate) venous, POCT     Status: Abnormal   Result Value Ref Range    Lactic Acid POCT 1.0 <=2.0 mmol/L    Bicarbonate Venous POCT 24 21 - 28 mmol/L    O2 Sat, Venous POCT 66 (L) 94 - 100 %    pCO2V Venous POCT 47 40 - 50 mm Hg    pH Venous POCT 7.30 (L) 7.32 - 7.43    pO2 Venous POCT 38 25 - 47 mm Hg   Symptomatic; Unknown Influenza A/B & SARS-CoV2 (COVID-19) Virus PCR Multiplex Nasopharyngeal     Status: Normal    Specimen: Nasopharyngeal; Swab   Result Value Ref Range    Influenza A PCR Negative Negative    Influenza B PCR Negative Negative    RSV PCR Negative Negative    SARS CoV2 PCR Negative Negative    Narrative    Testing was performed using the Xpert Xpress CoV2/Flu/RSV Assay on the [employer] GeneXpert Instrument. This test should be ordered for the detection of SARS-CoV-2 and influenza viruses in individuals who meet clinical and/or epidemiological criteria. Test performance is unknown in asymptomatic patients. This test is for in vitro diagnostic use under the FDA EUA for laboratories certified under CLIA to perform high or moderate complexity testing. This test has not been FDA cleared or approved. A negative result does not rule out the presence of PCR inhibitors in the specimen or target RNA in concentration below the limit of detection for the assay. If only one viral target is positive but coinfection with multiple targets is suspected, the sample should be re-tested with another FDA cleared, approved, or authorized test, if coinfection would change clinical management. This test was validated by the [facility]. These laboratories are certified under the Clinical Laboratory Improvement Amendments of 1988 (CLIA-88) as qualified to perform high complexity laboratory testing.   Procalcitonin     Status: Abnormal   Result Value Ref Range     Procalcitonin 7.49 (HH) <0.05 ng/mL   UA with Microscopic reflex to Culture     Status: Abnormal    Specimen: Urine, Midstream   Result Value Ref Range    Color Urine Light Brown (A) Colorless, Straw, Light Yellow, Yellow    Appearance Urine Slightly Cloudy (A) Clear    Glucose Urine Negative Negative mg/dL    Bilirubin Urine Negative Negative    Ketones Urine Negative Negative mg/dL    Specific Gravity Urine 1.015 1.003 - 1.035    Blood Urine Large (A) Negative    pH Urine 6.0 5.0 - 7.0    Protein Albumin Urine 20 (A) Negative mg/dL    Urobilinogen Urine Normal Normal, 2.0 mg/dL    Nitrite Urine Negative Negative    Leukocyte Esterase Urine Small (A) Negative    Mucus Urine Present (A) None Seen /LPF    RBC Urine >182 (H) <=2 /HPF    WBC Urine 21 (H) <=5 /HPF    Narrative    Urine Culture ordered based on laboratory criteria   CBC with Platelets & Differential     Status: Abnormal    Narrative    The following orders were created for panel order CBC with Platelets & Differential.  Procedure                               Abnormality         Status                     ---------                               -----------         ------                     CBC with platelets and d...[[id number]]  Abnormal            Final result                 Please view results for these tests on the individual orders.     Patient's response to treatments and/or interventions: fluids and ax helped bp    To be done/followed up on inpatient unit:  monitor blood pressure    Does this patient have any cognitive concerns?: unknown baseline     Activity level - Baseline/Home:  Independent  Activity Level - Current:   Stand with Assist    Patient's Preferred language: English   Needed?: No    Isolation: droplet  Infection: Not Applicable  Patient tested for COVID 19 prior to admission: YES  Bariatric?: No    Vital Signs:   Vitals:    10/09/22 1720 10/09/22 1730 10/09/22 1750 10/09/22 1800   BP: (!) 83/52 (!) 89/49 (!) 78/48 92/48    Pulse: 86 85 82 84   Resp: 21 26 18 24   Temp:       SpO2: 96% 97% 97% 98%   Weight:           Cardiac Rhythm: nsr    Was the PSS-3 completed:   Yesnsr  What interventions are required if any?               Family Comments: none  OBS brochure/video discussed/provided to patient/family: Yes              Name of person given brochure if not patient: na              Relationship to patient: na    For the majority of the shift this patient's behavior was Green.   Behavioral interventions performed were none.    ED NURSE PHONE NUMBER: [phone]

## 2022-10-09 NOTE — ED NOTES
"[facility]  ED Nurse Handoff Report    ED Chief complaint: Altered Mental Status      ED Diagnosis:   Final diagnoses:   None       Code Status: Full Code    Allergies:   Allergies   Allergen Reactions     Omeprazole Itching     Pantoprazole Itching     Prevacid [Lansoprazole] Itching     Lasix [Furosemide] Rash     Lidocaine Blisters and Rash     Allergy to lidocaine ointment  Allergy to lidocaine ointment       Penicillin G Rash     Penicillins Rash     \"broke out from injection\" 60 yrs ago  Tolerates cephalosporins       Patient Story: pt lives at home with his wife, last seen normal at 930pm yesterday. Pt wife called ems this afternoon due to pt being nonverbal and lethargic. Pt does move all extremeties yet not on demand. Opens eyes to voice, nonverbal. conjested cough. Hx of afib, has been SR in ER. Code stroke descalated.   Focused Assessment:  See chart    Treatments and/or interventions provided: see chart  Patient's response to treatments and/or interventions: see chaart    To be done/followed up on inpatient unit:  see chart    Does this patient have any cognitive concerns?: Disoriented to situation    Activity level - Baseline/Home:  Independent  Activity Level - Current:   Total Care    Patient's Preferred language: English   Needed?: No    Isolation: None  Infection: Not Applicable  Patient tested for COVID 19 prior to admission: YES  Bariatric?: No    Vital Signs:   Vitals:    10/09/22 1406 10/09/22 1411 10/09/22 1416 10/09/22 1431   BP: 103/49 (!) 85/55 97/57 107/52   Pulse: 93 (!) 130 (!) 128 96   Resp: (!) 0 (!) 34 (!) 34 25   Temp:       SpO2: 97% (!) 84% 96% 99%   Weight:           Cardiac Rhythm:     Was the PSS-3 completed:   Yes  What interventions are required if any?               Family Comments: attempted to get ahold of wife and son  OBS brochure/video discussed/provided to patient/family: N/A              Name of person given brochure if not patient: na        "       Relationship to patient: na    For the majority of the shift this patient's behavior was Yellow.   Behavioral interventions performed were none.    ED NURSE PHONE NUMBER: [phone]

## 2022-10-09 NOTE — ED NOTES
Pt reassessed, bladder scanned > 200ml. 16f rodriguez inserted with sterile technique. Clots and red tinged urine output, MD aware, urine sent. Pt given slow bolus over an hour for systolic of 70-80.  To MRI with 90 systolic, md ok with same. Received both AX\.

## 2022-10-09 NOTE — LETTER
Transition Communication Hand-off for Care Transitions to Next Level of Care Provider    Name: Hermilo Velasquez  : 11/3/1932  MRN #: [id number]  Primary Care Provider: Karson Bishop     Primary Clinic: [address]  [place] [address]     Reason for Hospitalization:  Encephalopathy [G93.40]  Pneumonia of lower lobe due to infectious organism, unspecified laterality [J18.9]  Admit Date/Time: 10/9/2022  1:35 PM  Discharge Date:10/15/22  Payor Source: Payor: [facility] / Plan: [employer] MEDICARE / Product Type: HMO /     Please contact LUIS or Care Coordinator for discharge planing.  We can be reached at [phone] or [phone].     Thank you.  LUIS Bradshaw

## 2022-10-09 NOTE — ED NOTES
Bed: ST01  Expected date:   Expected time:   Means of arrival:   Comments:  Kristin 3 89M bev COURTNEYW 5pm yesterday

## 2022-10-10 ENCOUNTER — APPOINTMENT (OUTPATIENT)
Dept: SPEECH THERAPY | Facility: CLINIC | Age: 87
DRG: 871 | End: 2022-10-10
Attending: INTERNAL MEDICINE
Payer: COMMERCIAL

## 2022-10-10 LAB
ALBUMIN SERPL-MCNC: 2.3 G/DL (ref 3.4–5)
ALP SERPL-CCNC: 66 U/L (ref 40–150)
ALT SERPL W P-5'-P-CCNC: 12 U/L (ref 0–70)
ANION GAP SERPL CALCULATED.3IONS-SCNC: 3 MMOL/L (ref 3–14)
AST SERPL W P-5'-P-CCNC: 24 U/L (ref 0–45)
BILIRUB SERPL-MCNC: 1 MG/DL (ref 0.2–1.3)
BUN SERPL-MCNC: 29 MG/DL (ref 7–30)
CALCIUM SERPL-MCNC: 7.7 MG/DL (ref 8.5–10.1)
CHLORIDE BLD-SCNC: 111 MMOL/L (ref 94–109)
CO2 SERPL-SCNC: 25 MMOL/L (ref 20–32)
CREAT SERPL-MCNC: 1.42 MG/DL (ref 0.66–1.25)
ERYTHROCYTE [DISTWIDTH] IN BLOOD BY AUTOMATED COUNT: 15.6 % (ref 10–15)
GFR SERPL CREATININE-BSD FRML MDRD: 47 ML/MIN/1.73M2
GLUCOSE BLD-MCNC: 113 MG/DL (ref 70–99)
HCT VFR BLD AUTO: 29.5 % (ref 40–53)
HGB BLD-MCNC: 8.7 G/DL (ref 13.3–17.7)
LACTATE SERPL-SCNC: 1.3 MMOL/L (ref 0.7–2)
MCH RBC QN AUTO: 29.1 PG (ref 26.5–33)
MCHC RBC AUTO-ENTMCNC: 29.5 G/DL (ref 31.5–36.5)
MCV RBC AUTO: 99 FL (ref 78–100)
PLATELET # BLD AUTO: 165 10E3/UL (ref 150–450)
POTASSIUM BLD-SCNC: 4.1 MMOL/L (ref 3.4–5.3)
PROT SERPL-MCNC: 5.6 G/DL (ref 6.8–8.8)
RBC # BLD AUTO: 2.99 10E6/UL (ref 4.4–5.9)
SODIUM SERPL-SCNC: 139 MMOL/L (ref 133–144)
WBC # BLD AUTO: 22.1 10E3/UL (ref 4–11)

## 2022-10-10 PROCEDURE — 36415 COLL VENOUS BLD VENIPUNCTURE: CPT | Performed by: INTERNAL MEDICINE

## 2022-10-10 PROCEDURE — 999N000157 HC STATISTIC RCP TIME EA 10 MIN

## 2022-10-10 PROCEDURE — 80053 COMPREHEN METABOLIC PANEL: CPT | Performed by: INTERNAL MEDICINE

## 2022-10-10 PROCEDURE — 250N000011 HC RX IP 250 OP 636: Performed by: INTERNAL MEDICINE

## 2022-10-10 PROCEDURE — 85027 COMPLETE CBC AUTOMATED: CPT | Performed by: INTERNAL MEDICINE

## 2022-10-10 PROCEDURE — 83605 ASSAY OF LACTIC ACID: CPT | Performed by: INTERNAL MEDICINE

## 2022-10-10 PROCEDURE — 94640 AIRWAY INHALATION TREATMENT: CPT

## 2022-10-10 PROCEDURE — 120N000001 HC R&B MED SURG/OB

## 2022-10-10 PROCEDURE — 96375 TX/PRO/DX INJ NEW DRUG ADDON: CPT

## 2022-10-10 PROCEDURE — 999N000127 HC STATISTIC PERIPHERAL IV START W US GUIDANCE

## 2022-10-10 PROCEDURE — 99233 SBSQ HOSP IP/OBS HIGH 50: CPT | Performed by: INTERNAL MEDICINE

## 2022-10-10 PROCEDURE — 258N000003 HC RX IP 258 OP 636: Performed by: INTERNAL MEDICINE

## 2022-10-10 PROCEDURE — 250N000013 HC RX MED GY IP 250 OP 250 PS 637: Performed by: INTERNAL MEDICINE

## 2022-10-10 PROCEDURE — 250N000009 HC RX 250: Performed by: INTERNAL MEDICINE

## 2022-10-10 PROCEDURE — 92526 ORAL FUNCTION THERAPY: CPT | Mod: GN

## 2022-10-10 PROCEDURE — 92610 EVALUATE SWALLOWING FUNCTION: CPT | Mod: GN

## 2022-10-10 PROCEDURE — 999N000105 HC STATISTIC NO DOCUMENTATION TO SUPPORT CHARGE

## 2022-10-10 RX ORDER — NITROGLYCERIN 0.4 MG/1
0.4 TABLET SUBLINGUAL EVERY 5 MIN PRN
Status: DISCONTINUED | OUTPATIENT
Start: 2022-10-10 | End: 2022-10-17 | Stop reason: HOSPADM

## 2022-10-10 RX ORDER — IPRATROPIUM BROMIDE AND ALBUTEROL SULFATE 2.5; .5 MG/3ML; MG/3ML
3 SOLUTION RESPIRATORY (INHALATION)
Status: DISCONTINUED | OUTPATIENT
Start: 2022-10-10 | End: 2022-10-12

## 2022-10-10 RX ORDER — MEROPENEM 500 MG/1
500 INJECTION, POWDER, FOR SOLUTION INTRAVENOUS EVERY 8 HOURS
Status: DISCONTINUED | OUTPATIENT
Start: 2022-10-10 | End: 2022-10-11

## 2022-10-10 RX ORDER — LIDOCAINE 40 MG/G
CREAM TOPICAL
Status: DISCONTINUED | OUTPATIENT
Start: 2022-10-10 | End: 2022-10-17 | Stop reason: HOSPADM

## 2022-10-10 RX ORDER — MEROPENEM 1 G/1
1 INJECTION, POWDER, FOR SOLUTION INTRAVENOUS EVERY 8 HOURS
Status: DISCONTINUED | OUTPATIENT
Start: 2022-10-10 | End: 2022-10-10

## 2022-10-10 RX ADMIN — ALBUTEROL SULFATE 2.5 MG: 2.5 SOLUTION RESPIRATORY (INHALATION) at 01:12

## 2022-10-10 RX ADMIN — SODIUM CHLORIDE, POTASSIUM CHLORIDE, SODIUM LACTATE AND CALCIUM CHLORIDE: 600; 310; 30; 20 INJECTION, SOLUTION INTRAVENOUS at 21:20

## 2022-10-10 RX ADMIN — FAMOTIDINE 20 MG: 10 INJECTION INTRAVENOUS at 08:40

## 2022-10-10 RX ADMIN — CEFEPIME HYDROCHLORIDE 2 G: 2 INJECTION, POWDER, FOR SOLUTION INTRAVENOUS at 08:38

## 2022-10-10 RX ADMIN — SODIUM CHLORIDE, POTASSIUM CHLORIDE, SODIUM LACTATE AND CALCIUM CHLORIDE: 600; 310; 30; 20 INJECTION, SOLUTION INTRAVENOUS at 08:37

## 2022-10-10 RX ADMIN — MEROPENEM 500 MG: 500 INJECTION, POWDER, FOR SOLUTION INTRAVENOUS at 18:09

## 2022-10-10 RX ADMIN — ALBUTEROL SULFATE 2.5 MG: 2.5 SOLUTION RESPIRATORY (INHALATION) at 05:28

## 2022-10-10 RX ADMIN — CEFEPIME HYDROCHLORIDE 2 G: 2 INJECTION, POWDER, FOR SOLUTION INTRAVENOUS at 01:05

## 2022-10-10 RX ADMIN — IPRATROPIUM BROMIDE AND ALBUTEROL SULFATE 3 ML: .5; 3 SOLUTION RESPIRATORY (INHALATION) at 16:44

## 2022-10-10 RX ADMIN — DOXYCYCLINE 100 MG: 100 INJECTION, POWDER, LYOPHILIZED, FOR SOLUTION INTRAVENOUS at 18:41

## 2022-10-10 RX ADMIN — FLUTICASONE FUROATE AND VILANTEROL TRIFENATATE 1 PUFF: 200; 25 POWDER RESPIRATORY (INHALATION) at 08:44

## 2022-10-10 RX ADMIN — DOXYCYCLINE 100 MG: 100 INJECTION, POWDER, LYOPHILIZED, FOR SOLUTION INTRAVENOUS at 04:37

## 2022-10-10 RX ADMIN — UMECLIDINIUM 1 PUFF: 62.5 AEROSOL, POWDER ORAL at 08:44

## 2022-10-10 ASSESSMENT — ACTIVITIES OF DAILY LIVING (ADL)
ADLS_ACUITY_SCORE: 39
ADLS_ACUITY_SCORE: 35
ADLS_ACUITY_SCORE: 43
ADLS_ACUITY_SCORE: 35
ADLS_ACUITY_SCORE: 39
ADLS_ACUITY_SCORE: 39
ADLS_ACUITY_SCORE: 35
ADLS_ACUITY_SCORE: 39
ADLS_ACUITY_SCORE: 39
ADLS_ACUITY_SCORE: 35

## 2022-10-10 NOTE — PROGRESS NOTES
[facility]    Hospitalist Progress Note    Brief Summary:  Hermilo Velasquez is a 89 year old male with chronic atrial fibrillation, history of PE, CKD 3, cognitive impairment, dysphagia, colonic AVMs, s/p TAVR for aortic stenosis who presents on 10/9/2022 due to decreased responsiveness.      He was recently hospitalized from 8/23 -9/8 due to acute GI bleed with hemoglobin of 5.8, thought to be due to colonic AVMs.  He was transfused 2 units of PRBCs, EGD did not show any bleeding and colonoscopy was deferred due to delirium.  Anticoagulation was discontinued.  While hospitalized he developed right-sided pneumonia thought to be due to aspiration.  Speech pathology was consulted but patient did not tolerate video swallow study and did not tolerate modified diet.  Palliative care was consulted for goals of care discussion and family decided to continue aggressive cares and full CODE STATUS.     He had episode of emesis 1 day PTA,  but overall did well.  He also had decreased urine output yesterday,  Presented to ED with decrease responsiveness,   Noted to have urinary retention and Suazo catheter was placed.  Further work-up showed sepsis-due to worsening right-sided pneumonia and probable acute cystitis.    Assessment & Plan         #.  Severe sepsis-present on admission-source right sided pneumonia versus aspiration pneumonia and   UTI  #.  Acute Metabolic encephalopathy-secondary to sepsis: resolved.     -With low blood pressure of systolic 80s, leukocytosis of 25, acute metabolic encephalopathy,  ARF patient meets criteria for severe sepsis on admission, patient is now awake, alert and oriented and breathing comfortably  At this time.   -Is responding to IV fluids-blood pressure now improve with that.   -Lactate is normal.  CRP and procalcitonin elevated.   -Chest x-ray shows worsening right-sided infiltrate.  Urine is mildly abnormal as well concerning for UTI  -Started on IV cefepime  and doxycycline after blood cultures were obtained.  Continue with IV Cefepime as allergic to PCN but tolerating Cefepime and Doxy for now.  IS, flutter.   -Continue IV antibiotics  -IV fluids at 100 ml/h     #.  Aspiration pneumonia versus worsening right-sided pneumonia  #.  Dysphagia  -Was treated with IV cefepime and doxycycline during recent hospitalization due to hospital-acquired pneumonia with concern for aspiration, restarted on same medication, give possibility of aspiration will  Change cefepime to Meropenem now.   -X-ray now shows worsening infiltrate in right lung  - did have episode of emesis day before admission-has known dysphagia   Consult speech for evaluation, much  More awake and alert now and wanted to eat.   -start on DuoNeb as well as some wheezing on exam.   Speech to evaluate and initiate on diet if pass.      #.  UTI with hematuria  #.  Acute urinary retention  -Suazo catheter placed in ER with return of 800 mL of urine.  Initially there were blood and blood clots but these have not cleared  -His UA is abnormal with mild pyuria  -IV antibiotics as above  -Continue Suazo catheter  -start him on Proscar and Flomax      #.  Recent acute GI bleed suspected to be due to colonic AVMs-August 2022  -Hemoglobin stable and at baseline, hemoglobin 8.5  -Anticoagulation was discontinued at that time     #.  Cognitive impairment  -Goals of care were discussed during previous admission.  Family chose to keep him full code and all aggressive treatments  Stable at this time.      #.  Chronic atrial fibrillation  #.  History of pulmonary embolism  -Hold Toprol-XL due to hypotension, resume it from tomorrow if remain stable.   -Anticoagulation discontinued during previous admission due to GI bleed     #.  S/p TAVR for aortic stenosis  TTE (8/22): EF 50-55%, unable to evaluate WMA due to limited study; mean AV gradient 14 mmHg  Stable              DVT Prophylaxis: Pneumatic Compression Devices  Code Status:  Full Code    Disposition: Expected discharge in 2 days once stable    Aniket Bowden MD, MD  Text Page  (7am - 6pm)    Interval History   Patient seen and evaluated in the his room today, awake and alert, he told me that overnight with nurses shift change he drink water without any trouble X 2 and wanted to eat, feeling well, no chest pain or SOB at this time.   No fever, chills, headache or dizziness    No other significant event overnight.     -Data reviewed today: I reviewed all new labs and imaging results over the last 24 hours. I personally reviewed no images or EKG's today.    Physical Exam   Temp: 97.8  F (36.6  C) Temp src: Oral BP: 100/56 Pulse: 76   Resp: 18 SpO2: 95 % O2 Device: Nasal cannula Oxygen Delivery: 3 LPM  Vitals:    10/09/22 1338   Weight: 70.8 kg (156 lb 1.4 oz)     Vital Signs with Ranges  Temp:  [97.8  F (36.6  C)-99.1  F (37.3  C)] 97.8  F (36.6  C)  Pulse:  [] 76  Resp:  [0-45] 18  BP: ()/(44-80) 100/56  SpO2:  [84 %-100 %] 95 %  I/O last 3 completed shifts:  In: -   Out: 1300 [Urine:1300]    Constitutional: awake, alert, cooperative, no apparent distress, and appears stated age  Eyes: Lids and lashes normal, pupils equal, round and reactive to light, extra ocular muscles intact, sclera clear, conjunctiva normal  Respiratory: No increased work of breathing, right sided crackles and wheezing, left side clear  Cardiovascular: Normal apical impulse, regular rate and rhythm, normal S1 and S2, no S3 or S4, and no murmur noted  GI: No scars, normal bowel sounds, soft, non-distended, non-tender, no masses palpated, no hepatosplenomegally  Musculoskeletal: There is no redness, warmth, or swelling of the joints.  Full range of motion noted.  Motor strength is 5 out of 5 all extremities bilaterally.  Tone is normal.  Neurologic: Awake, alert, no focal deficit.     Medications     lactated ringers 100 mL/hr at 10/10/22 1000       ceFEPIme  2 g Intravenous Q12H     doxycycline  100 mg  Intravenous Q12H     famotidine  20 mg Intravenous Daily     fluticasone-vilanterol  1 puff Inhalation Daily     ipratropium - albuterol 0.5 mg/2.5 mg/3 mL  3 mL Nebulization 4x daily     sodium chloride (PF)  3 mL Intracatheter Q8H     umeclidinium  1 puff Inhalation Daily       Data   Recent Labs   Lab 10/10/22  0700 10/09/22  1353   WBC 22.1* 25.3*   HGB 8.7* 8.5*   MCV 99 97    194   INR  --  1.19*    143   POTASSIUM 4.1 4.0   CHLORIDE 111* 114*   CO2 25 27   BUN 29 23   CR 1.42* 1.57*   ANIONGAP 3 2*   AMRITA 7.7* 7.6*   * 118*   ALBUMIN 2.3*  --    PROTTOTAL 5.6*  --    BILITOTAL 1.0  --    ALKPHOS 66  --    ALT 12  --    AST 24  --        Recent Results (from the past 24 hour(s))   CTA Head Neck w Contrast    Narrative    EXAM: CTA HEAD NECK WITH CONTRAST  LOCATION: [facility]  DATE/TIME: 10/09/2022, 1:56 PM    INDICATION: Altered mental status.  COMPARISON: Head CT 10/09/2022.  CONTRAST: 75 mL Isovue 370.  TECHNIQUE: Head and neck CT angiogram with IV contrast. Noncontrast head CT followed by axial helical CT images of the head and neck vessels obtained during the arterial phase of intravenous contrast administration. Axial 2D reconstructed images and   multiplanar 3D MIP reconstructed images of the head and neck vessels were performed by the technologist. Dose reduction techniques were used. All stenosis measurements made according to NASCET criteria unless otherwise specified.    FINDINGS:     NONCONTRAST HEAD CT:   Described separately.    HEAD CTA:  ANTERIOR CIRCULATION: No stenosis/occlusion, aneurysm, or high-flow vascular malformation. Standard [place] IRA of Winkler anatomy. Satisfactory peripheral branch arborization of the YASHIRA and MCA vascular territories. Nonstenotic calcification of the cavernous   ICA segments.    POSTERIOR CIRCULATION: No stenosis/occlusion, aneurysm, or high-flow vascular malformation. Balanced vertebral arteries supply a normal basilar  artery. Satisfactory peripheral branch arborization PCA vascular territory. Mild ectasia at the basilar tip   series 13 image 49.    DURAL VENOUS SINUSES: Expected enhancement of the major dural venous sinuses.    NECK CTA:  RIGHT CAROTID: Calcification at the bifurcation and proximal right ICA with mild proximal right ICA stenosis probably 20-30% in degree. No high-grade or tandem stenosis or dissection.    LEFT CAROTID: Nonstenotic calcification mid/distal left CCA eccentrically. Calcification at the bifurcation and proximal left ICA with mild to moderate proximal left ICA narrowing 30-50% series 10 image 261. No additional or tandem ICA stenosis is   present. No dissection.    VERTEBRAL ARTERIES: No focal stenosis or dissection. Balanced vertebral arteries.    AORTIC ARCH: Classic aortic arch anatomy with no significant stenosis at the origin of the great vessels. Tortuosity without stenosis of the right innominate arterial origin and proximal subclavian arteries bilaterally.    NONVASCULAR STRUCTURES: No pathologic enhancement is evident intracranially. No pathologic orbital enhancement with procedural changes both globes. Partial opacification paranasal sinuses. No evidence for mass or adenopathy within the neck soft tissues.   Multilevel degenerative cervical spondylosis. No severe spinal stenosis is present with a few levels of mild to moderate foraminal compromise. Asymmetric coarsened infiltrate throughout much of the right mid and upper lung with some emphysematous changes   left lung. Correlate for unilateral right-sided pneumonitis. Possible area of focal infiltrate or volume of the azygos at the right retrotracheal level. Sternotomy. Patent airway. Satisfactory cervical prevertebral soft tissues.      Impression    IMPRESSION:   HEAD CT:  1.  Described separately.    HEAD CTA:   1.  No hemodynamic stenosis. No evidence for large vessel occlusion, dissection or aneurysm. Satisfactory peripheral branch  arborization YASHIRA, MCA and PCA vascular territory. No pathologic enhancement. Dural venous sinus patterns of enhancement are   normal.    NECK CTA:  1.  No high-grade stenosis or dissection. Mild narrowing ICA origins bilaterally.    2.  Infiltrate right mid and upper lung noted.    3.  See above for details and description.    4.  Discussed with Dr. Montiel at 2:16 PM on 10/09/2022.     CT Head Perfusion w Contrast    Narrative    EXAM: CT HEAD PERFUSION W CONTRAST  LOCATION: [facility]  DATE/TIME: 10/9/2022 2:15 PM    INDICATION: Altered mental status  COMPARISON: CT/CT angiogram head and neck performed today  TECHNIQUE: CT cerebral perfusion was performed utilizing a second contrast bolus. Perfusion data were post processed with generation of standard perfusion maps and estimation of ischemic/infarcted volumes utilizing standard threshold values. Dose   reduction techniques were used. All stenosis measurements made according to NASCET criteria unless otherwise specified.  CONTRAST: 50mL Isovue 370  COMPARISON: None.    FINDINGS:     CT PERFUSION:  PERFUSION MAPS: Symmetrical cerebral perfusion. No focal deficits in cerebral blood flow or volume to suggest ischemia/oligemia.    RAPID ANALYSIS:  CBF<30%: 0 mL  Tmax>6sec: 0 mL  Mismatch volume: 0 mL  Mismatch ratio: None    CT PERFUSION:  1.  Symmetric cerebral perfusion.. No focal perfusion defects or cerebral blood flow/volume are noted.   XR Chest 2 Views    Narrative    EXAM: XR CHEST 2 VIEWS  LOCATION: [facility]  DATE/TIME: 10/09/2022, 4:02 PM    INDICATION: AMS.  COMPARISON: 08/27/2022.      Impression    IMPRESSION: Patchy opacifications throughout the right hemithorax have clearly worsened since previous exam, especially the right upper lobe. Findings concerning for pneumonia, perhaps with superimposed chronic pulmonary disease. Heart size is normal.   Subtle opacities in the left lung are relatively  unchanged. Median sternotomy wires, unchanged.     CT Abdomen Pelvis w/o Contrast    Narrative    EXAM: CT ABDOMEN PELVIS W/O CONTRAST  LOCATION: [facility]  DATE/TIME: 10/9/2022 4:29 PM    INDICATION: fever, ams  COMPARISON: Same day chest radiograph, CT 04/10/2022, 08/16/2020  TECHNIQUE: CT scan of the abdomen and pelvis was performed without IV contrast. Multiplanar reformats were obtained. Dose reduction techniques were used.  CONTRAST: None.    FINDINGS:   LOWER CHEST: Partially visualized airspace disease in the right lower and middle lobes. There is a small right pleural effusion with thickened and enhancing pleura noted (series 2 image 32).    HEPATOBILIARY: Multiple small well-circumscribed hypodense hepatic lesions, likely cysts, present to some degree since at least 2011, no specific follow-up recommended.    PANCREAS: Normal.    SPLEEN: Normal.    ADRENAL GLANDS: Normal.    KIDNEYS/BLADDER: Persistent nephrograms with excreted contrast in the renal collecting system bilaterally. Nonspecific perinephric stranding. No hydronephrosis. Urinary bladder is distended with mild wall thickening.    BOWEL: No obstruction or inflammatory change.    LYMPH NODES: Normal.    VASCULATURE: Unchanged saccular infrarenal abdominal aortic aneurysm measuring up to 3.9 cm (series 2 image 124). Severe calcified atherosclerosis.    PELVIC ORGANS: Prostatomegaly with likely prior TURP.    MUSCULOSKELETAL: No acute bony abnormality. Healed right rib fractures. Mild body wall edema.        Impression    IMPRESSION:   1.  Sequela of chronic bladder outlet obstruction without hydronephrosis. Nonspecific perinephric stranding. Recommend correlation with urinalysis.  2.  No additional infectious source visualized in the abdomen or pelvis.  3.  Partially visualized airspace disease in the right lung with small right pleural effusion with pleural thickening and enhancement, suggestive of exudative effusion,  developing empyema is possible.     MR Brain w/o Contrast    Narrative    EXAM: MR BRAIN W/O CONTRAST  LOCATION: [facility]  DATE/TIME: 10/9/2022 6:52 PM    INDICATION: Encephalopathy with questionable left sided focal weakness  COMPARISON: CT head 10/09/2022  TECHNIQUE: Routine multiplanar multisequence head MRI without intravenous contrast.    FINDINGS:  INTRACRANIAL CONTENTS: No acute or subacute infarct. No mass, acute hemorrhage, or extra-axial fluid collections. Patchy nonspecific T2/FLAIR hyperintensities within the cerebral white matter most consistent with mild to moderate chronic microvascular   ischemic change. Mild to moderate generalized cerebral atrophy. No hydrocephalus. Normal position of the cerebellar tonsils.     SELLA: No abnormality accounting for technique.    OSSEOUS STRUCTURES/SOFT TISSUES: Normal marrow signal. The major intracranial vascular flow voids are maintained.     ORBITS: No abnormality accounting for technique.     SINUSES/MASTOIDS: Mild to moderate mucosal thickening scattered about the paranasal sinuses. No middle ear or mastoid effusion.       Impression    IMPRESSION:  1.  No acute intracranial process.  2.  Generalized brain atrophy and presumed microvascular ischemic changes as detailed above.

## 2022-10-10 NOTE — PROGRESS NOTES
"Vascular neurology note     MRI brain is done and showed no acute pathology. This supports our impression that the presenting symptoms are due to metabolic encephalopathy (sepsis, FRED) rather than stroke. No further stroke workup is recommended.     Will sign off. Please call with questions.     Radha Diaz MD, Msc, FANO, FAAN   of Neurology  [facility]     10/09/2022 8:24 PM  To page me or covering stroke neurology team member, click here: AMCOM  Choose \"On Call\" tab at top, then search dropdown box for \"Neurology Adult\" & press Enter, look for Neuro ICU/Stroke    "

## 2022-10-10 NOTE — H&P
[facility]    History and Physical - Hospitalist Service       Date of Admission:  10/9/2022    Assessment & Plan      Hermilo Velasquez is a 89 year old male with chronic atrial fibrillation, history of PE, CKD 3, cognitive impairment, dysphagia, colonic AVMs, s/p TAVR for aortic stenosis who presents on 10/9/2022 due to decreased responsiveness.     He was recently hospitalized from 8/23 -9/8 due to acute GI bleed with hemoglobin of 5.8, thought to be due to colonic AVMs.  He was transfused 2 units of PRBCs, EGD did not show any bleeding and colonoscopy was deferred due to delirium.  Anticoagulation was discontinued.  While hospitalized he developed right-sided pneumonia thought to be due to aspiration.  Speech pathology was consulted but patient did not tolerate video swallow study and did not tolerate modified diet.  Palliative care was consulted for goals of care discussion and family decided to continue aggressive cares and full CODE STATUS.    He had episode of emesis yesterday but overall did well.  He also had decreased urine output yesterday.  Today he did not wake up and was transported to hospital.  Work-up for stroke was negative.  Noted to have urinary retention and Suazo catheter was placed.  Further work-up showed sepsis-due to worsening right-sided pneumonia and probable acute cystitis.     #.  Severe sepsis-present on admission-source right sided pneumonia versus aspiration pneumonia versus acute cystitis  #.  Metabolic encephalopathy-secondary to sepsis  -With low blood pressure of systolic 80s, leukocytosis of 25, acute metabolic encephalopathy patient meets criteria for severe sepsis  -Is responding to IV fluids-blood pressure now in 90s after 2 L fluid bolus  -Lactate is normal.  CRP and procalcitonin significantly elevated  -Chest x-ray shows worsening right-sided infiltrate.  Urine is normal as well concerning for UTI  -Started on IV cefepime and doxycycline after  blood cultures were obtained  -Continue IV antibiotics  -IV fluids at 100 ml/h  -[facility] status    #.  Aspiration pneumonia versus worsening right-sided pneumonia  #.  Dysphagia  -Was treated with IV cefepime and doxycycline during recent hospitalization due to hospital-acquired pneumonia with concern for aspiration  -X-ray now shows worsening infiltrate in right lung  -No clear respiratory symptoms but did have episode of emesis day before admission-has known dysphagia   -Antibiotics as above  -as needed and scheduled nebs  -Resume home inhalers once verified  -Dysphagia work-up was started during previous admission but could not be completed as patient did not tolerate video swallow study.  He also did not tolerate modified diet and thus remains on regular diet with thin liquids  -We will keep n.p.o. for now due to metabolic encephalopathy  -Consider speech therapy evaluation once he is more awake    #.  Probable acute cystitis with hematuria  #.  Acute urinary retention  -Suazo catheter placed in ER with return of 800 mL of urine.  Initially there were blood and blood clots but these have not cleared  -His UA is abnormal with mild pyuria  -IV antibiotics as above  -Continue Suazo catheter  -Trial of voiding once mental status improves    #.  Recent acute GI bleed suspected to be due to colonic AVMs-August 2022  -Hemoglobin stable and at baseline, hemoglobin 8.5  -Anticoagulation was discontinued at that time    #.  Cognitive impairment  -Goals of care were discussed during previous admission.  Family chose to keep him full code and all aggressive treatments  -Discussed again today.  Wife wants him to be full code and continue with aggressive measures    #.  Chronic atrial fibrillation  #.  History of pulmonary embolism  -Hold Toprol-XL due to hypotension  -Anticoagulation discontinued during previous admission due to GI bleed    #.  S/p TAVR for aortic stenosis  TTE (8/22): EF 50-55%, unable to evaluate WMA due to  limited study; mean AV gradient 14 mmHg  Stable       Diet:  N.p.o.  DVT Prophylaxis: Pneumatic Compression Devices  Suazo Catheter: Not present  Central Lines: None  Cardiac Monitoring: None  Code Status:  Full code    Clinically Significant Risk Factors Present on Admission               # Coagulation Defect: home medication list includes an anticoagulant medication            Disposition Plan      Expected Discharge Date: 10/11/2022                The patient's care was discussed with the Patient's Family.    Glenny Kelly MD  Hospitalist Service  [facility]  Securely message with the Vocera Web Console (learn more here)  Text page via [facility] Paging/Directory         ______________________________________________________________________    Chief Complaint   Decreased responsiveness    History is obtained from the patient and patient's spouse    History of Present Illness   Hermilo Velasquez is a 89 year old male with chronic atrial fibrillation, history of PE, CKD 3, cognitive impairmen, dysphagia, colonic AVMs, s/p TAVR for aortic stenosis who was brought in today due to decreased responsiveness.     He was recently hospitalized from 8/23 -9/8 due to acute GI bleed with hemoglobin of 5.8, thought to be due to colonic AVMs.  He was transfused 2 units of PRBCs, EGD did not show any bleeding and colonoscopy was deferred due to delirium.  Anticoagulation was discontinued.  While hospitalized he developed right-sided pneumonia thought to be due to aspiration.  Speech pathology was consulted but patient did not tolerate video swallow study and did not tolerate modified diet.  Palliative care was consulted for goals of care discussion and family decided to continue aggressive cares and full CODE STATUS.    Wife reports that since his discharge, he has been slowly improving.  Yesterday he had a good day and was able to go up and down the stairs..  Complaining of decreased urination yesterday.   Reported that he was going to bathroom frequently but not much urine is coming out.    This morning, his wife noted that he was very somnolent, had difficulty waking up.  She called EMS and this he was brought to ER for evaluation.    He had episode of emesis yesterday.    No fever or chills, no chest pain or shortness of breath, no abdominal pain    Initially worked up for stroke but head CT/MRI of brain were negative.    Noted to be hypotensive with blood pressure in 80s systolic.  Improved with 2 L of normal saline bolus.    Labs showed acute kidney injury, creatinine 1.57, normal lactate, elevated procalcitonin of 7.49, elevated CRP, leukocytosis of 25.3, stable hemoglobin of 8.5.    Further work-up showed worsening right-sided pulmonary infiltrates.  UA also concerning for UTI.    Suazo catheter was placed in ER due to urinary retention.  About 800 mL obtained.  Initially there were some blood clots as well but these have cleared.    Was administered IV cefepime and doxycycline in ER.      Review of Systems    Unable to obtain review of systems due to metabolic encephalopathy    Past Medical History    I have reviewed this patient's medical history and updated it with pertinent information if needed.   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 Vasques      Aortic stenosis     Severe AS, 9/2015 AVR - [facility] TRIFECTA Bovine bioprosthesis 25MM TF-25A     Atrial fibrillation (H)     9/2015 Paroxysmal post op Afib - discharged on Warfarin and a beta blocker     COPD (chronic obstructive pulmonary disease) (H)      Deep vein thrombosis (H)      GERD (gastroesophageal reflux disease)      Heart murmur      Monoclonal gammopathy     plasmacyte prominent causing monoclonal gammopathy     Need for SBE (subacute bacterial endocarditis) prophylaxis      Neuropathy      Other and unspecified hyperlipidemia      Other malignant lymphomas     non  hodgkin's lymphoma     RBBB (right bundle branch block)      Severe sepsis with acute organ dysfunction (H) 11/16/2015     Unspecified hereditary and idiopathic peripheral neuropathy        Past Surgical History   I have reviewed this patient's surgical history and updated it with pertinent information if needed.  Past Surgical History:   Procedure Laterality Date     APPENDECTOMY       ARTHROPLASTY KNEE Right 7/25/2022    Procedure: RIGHT TOTAL KNEE ARTHROPLASTY;  Surgeon: Giuliano Deshpande MD;  Location:  OR     AS TOTAL KNEE ARTHROPLASTY       BACK SURGERY       ESOPHAGOSCOPY, GASTROSCOPY, DUODENOSCOPY (EGD), COMBINED N/A 11/28/2015    Procedure: COMBINED ESOPHAGOSCOPY, GASTROSCOPY, DUODENOSCOPY (EGD);  Surgeon: Danis Castillo MD;  Location:  GI     ESOPHAGOSCOPY, GASTROSCOPY, DUODENOSCOPY (EGD), COMBINED N/A 7/26/2018    Procedure: COMBINED ESOPHAGOSCOPY, GASTROSCOPY, DUODENOSCOPY (EGD);;  Surgeon: Toby Dong DO;  Location:  GI     ESOPHAGOSCOPY, GASTROSCOPY, DUODENOSCOPY (EGD), COMBINED N/A 8/17/2020    Procedure: ESOPHAGOGASTRODUODENOSCOPY (EGD);  Surgeon: Herrera Henry MD;  Location:  GI     ESOPHAGOSCOPY, GASTROSCOPY, DUODENOSCOPY (EGD), COMBINED N/A 10/24/2020    Procedure: ESOPHAGOGASTRODUODENOSCOPY (EGD);  Surgeon: Vick Florentino MD;  Location:  GI     ESOPHAGOSCOPY, GASTROSCOPY, DUODENOSCOPY (EGD), COMBINED N/A 4/11/2022    Procedure: ESOPHAGOGASTRODUODENOSCOPY (EGD);  Surgeon: Vick Florentino MD;  Location:  GI     ESOPHAGOSCOPY, GASTROSCOPY, DUODENOSCOPY (EGD), COMBINED N/A 8/26/2022    Procedure: ESOPHAGOGASTRODUODENOSCOPY (EGD);  Surgeon: El Mcgovern MD;  Location:  GI     HERNIA REPAIR  2006     HERNIORRHAPHY VENTRAL  4/17/2013    Procedure: HERNIORRHAPHY VENTRAL;  VENTRAL HERNIA REPAIR WITH MESH;  Surgeon: Patel Guzman MD;  Location:  OR     KNEE SURGERY      arthroscopic right knee surgery      LOBECTOMY LUNG Right 3/26/2019     POSSIBLE LOBECTOMY LUNG per Dr. Vasques at [facility]     REPAIR LIGAMENT ANKLE  2012    Procedure:REPAIR LIGAMENT ANKLE; LEFT TARSAL TUNNEL RELEASE OF KNOT OF ARIEL RELEASE; Surgeon:SAUL PUENTE; Location:[facility] OR     REPLACE VALVE AORTIC N/A 9/3/2015    Procedure: REPLACE VALVE AORTIC;  Surgeon: Antonino Mitchell MD;  Location: [facility] OR     ROTATOR CUFF REPAIR RT/LT      bilateral     SPINE SURGERY      3 spine surgeries     THORACOTOMY, WEDGE RESECTION LUNG, COMBINED Right 3/26/2019    RIGHT THORACOTOMY, WEDGE RESECTION, RIGHT LOWER LOBE LUNG NODULE,;  Surgeon: Jose A Vasques MD;  Location: [facility] OR     TONSILLECTOMY       TURP         Social History   I have reviewed this patient's social history and updated it with pertinent information if needed.  Social History     Tobacco Use     Smoking status: Former     Packs/day: 2.00     Years: 55.00     Pack years: 110.00     Types: Cigarettes     Quit date: 1998     Years since quittin.7     Smokeless tobacco: Never   Vaping Use     Vaping Use: Never used   Substance Use Topics     Alcohol use: Not Currently     Drug use: No       Family History   I have reviewed this patient's family history and updated it with pertinent information if needed.  Family History   Problem Relation Age of Onset     C.A.D. Father      Emphysema Father      Melanoma No family hx of      Skin Cancer No family hx of        Prior to Admission Medications   Prior to Admission Medications   Prescriptions Last Dose Informant Patient Reported? Taking?   OLANZapine (ZYPREXA) 5 MG tablet Past Week  Yes Yes   Sig: Take 1 tablet (5 mg) by mouth 2 times daily as needed (cognitive impairment)   acetaminophen (TYLENOL) 500 MG tablet Past Week at prn Self Yes Yes   Sig: Take 1,000 mg by mouth 3 times daily as needed   albuterol (PROAIR HFA/PROVENTIL HFA/VENTOLIN HFA) 108 (90 Base) MCG/ACT inhaler Past Week at prn Self Yes Yes   Sig: Inhale 2 puffs into the lungs every 6 hours  "as needed   albuterol (PROVENTIL) (2.5 MG/3ML) 0.083% neb solution Past Week at prn Self No Yes   Sig: Take 1 vial (2.5 mg) by nebulization every 6 hours as needed for shortness of breath / dyspnea or wheezing   alfuzosin ER (UROXATRAL) 10 MG 24 hr tablet Past Week Self No Yes   Sig: Take 1 tablet (10 mg) by mouth daily   apixaban ANTICOAGULANT (ELIQUIS) 5 MG tablet Not Taking  Yes No   Sig: Take 1 tablet (5 mg) by mouth 2 times daily   Patient not taking: Reported on 10/9/2022   atorvastatin (LIPITOR) 10 MG tablet Not Taking Self No No   Sig: Take 1 tablet (10 mg) by mouth daily   Patient not taking: Reported on 10/9/2022   famotidine (PEPCID) 40 MG tablet Past Week Self Yes Yes   Sig: Take 40 mg by mouth 2 times daily   ferrous sulfate (FE TABS) 325 (65 Fe) MG EC tablet Past Week Self Yes Yes   Sig: Take 1 tablet (325 mg) by mouth 2 times daily   fluticasone-vilanterol (BREO ELLIPTA) 200-25 MCG/INH inhaler Past Week  Yes Yes   Sig: Inhale 1 puff into the lungs daily   gabapentin (NEURONTIN) 100 MG capsule Past Week  No Yes   Sig: Take 2 capsules (200 mg) by mouth At Bedtime   metoprolol tartrate (LOPRESSOR) 25 MG tablet Past Week  No Yes   Sig: Take 1 tablet (25 mg) by mouth 2 times daily   tiotropium (SPIRIVA) 18 MCG inhaled capsule Past Week Self Yes Yes   Sig: Inhale 18 mcg into the lungs daily      Facility-Administered Medications: None     Allergies   Allergies   Allergen Reactions     Omeprazole Itching     Pantoprazole Itching     Prevacid [Lansoprazole] Itching     Lasix [Furosemide] Rash     Lidocaine Blisters and Rash     Allergy to lidocaine ointment  Allergy to lidocaine ointment       Penicillin G Rash     Penicillins Rash     \"broke out from injection\" 60 yrs ago  Tolerates cephalosporins       Physical Exam   Vital Signs: Temp: 99.1  F (37.3  C)   BP: 91/45 Pulse: 76   Resp: 16 SpO2: 96 %      Weight: 156 lbs 1.37 oz    Constitutional -somnolent, wakes up easily but does not answer questions, " resting in bed, appears comfortable  Head - normocephalic, atraumatic  ENT - normal eye lids and lashes, no conjunctival hyperemia, no icterus, extraocular movements are normal, normal nose, no discharge, moist oral mucosa, no ulcers or exudates, normal external ear  Neck - no thyromegaly or lymphadenopathy. Tracheal is midline  CV -irregular rhythm, rate okay, no murmurs, no edema  Pulmonary - lungs are clear to auscultation bilaterally, no wheezing or rhonchi  GI - abdomen is soft, non distended, non tender, bowel sounds are present, no organomegaly  Neurological -somnolent, moving all 4 extremities   Musculoskeletal - no joint erythema or swelling, ROM is ok            Data   Data reviewed today: I reviewed all medications, new labs and imaging results over the last 24 hours. I personally reviewed the chest x-ray image(s) showing Worsening right-sided infiltrates.    Recent Labs   Lab 10/09/22  1353   WBC 25.3*   HGB 8.5*   MCV 97      INR 1.19*      POTASSIUM 4.0   CHLORIDE 114*   CO2 27   BUN 23   CR 1.57*   ANIONGAP 2*   AMRITA 7.6*   *     Recent Results (from the past 24 hour(s))   CT Head w/o Contrast    Narrative    EXAM: CT HEAD WITHOUT CONTRAST  LOCATION: [facility]  DATE/TIME: 10/09/2022, 1:54 PM    INDICATION: Altered mental status.  COMPARISON: 08/27/2022.  TECHNIQUE: Routine CT Head without IV contrast. Multiplanar reformats. Dose reduction techniques were used.    FINDINGS:  INTRACRANIAL CONTENTS: No acute intracranial hemorrhage. Stable prominence of CSF bifrontal level may reflect atrophy or chronic subdural fluid collections, unchanged from prior study. No CT evidence of acute infarct. Mild to moderate presumed chronic   small vessel ischemic changes. Moderate generalized volume loss. No hydrocephalus. Cerebellar tonsillar position is satisfactory. Vascular calcification. Sella is normal. Corpus callosum is intact.     VISUALIZED ORBITS/SINUSES/MASTOIDS:  Prior bilateral cataract surgery. Visualized portions of the orbits are otherwise unremarkable. Secretions and partial opacification right maxillary sinus with osteitis suggests acute on chronic sinonasal inflammatory   changes. Membrane thickening elsewhere ethmoid air cells. Polypoid membrane thickening floor of the left maxillary sinus. Scattered fluid/membrane thickening in the left mastoid air cells. No apparent mass in the posterior nasopharynx or skull base.    BONES/SOFT TISSUES: No evidence for fracture of the calvarium or skull base. Mild degenerative changes both TMJs. Mild demineralization. No swelling of the facial or scalp tissues.      Impression    IMPRESSION:  1.  No CT evidence for acute intracranial process.    2.  Brain atrophy and presumed chronic microvascular ischemic changes as above.    3.  Nothing for acute or evolving infarction. No hyperdense hemorrhage.    4.  Stable intracranial appearance from 08/27/2022.    5.  Results discussed with referring clinician Dr. Montiel on 10/09/2022 at 2:00 PM.     CTA Head Neck w Contrast    Narrative    EXAM: CTA HEAD NECK WITH CONTRAST  LOCATION: [facility]  DATE/TIME: 10/09/2022, 1:56 PM    INDICATION: Altered mental status.  COMPARISON: Head CT 10/09/2022.  CONTRAST: 75 mL Isovue 370.  TECHNIQUE: Head and neck CT angiogram with IV contrast. Noncontrast head CT followed by axial helical CT images of the head and neck vessels obtained during the arterial phase of intravenous contrast administration. Axial 2D reconstructed images and   multiplanar 3D MIP reconstructed images of the head and neck vessels were performed by the technologist. Dose reduction techniques were used. All stenosis measurements made according to NASCET criteria unless otherwise specified.    FINDINGS:     NONCONTRAST HEAD CT:   Described separately.    HEAD CTA:  ANTERIOR CIRCULATION: No stenosis/occlusion, aneurysm, or high-flow vascular malformation.  Standard [place] of [place]ink[place] anatomy. Satisfactory peripheral branch arborization of the YASHIRA and MCA vascular territories. Nonstenotic calcification of the cavernous   ICA segments.    POSTERIOR CIRCULATION: No stenosis/occlusion, aneurysm, or high-flow vascular malformation. Balanced vertebral arteries supply a normal basilar artery. Satisfactory peripheral branch arborization PCA vascular territory. Mild ectasia at the basilar tip   series 13 image 49.    DURAL VENOUS SINUSES: Expected enhancement of the major dural venous sinuses.    NECK CTA:  RIGHT CAROTID: Calcification at the bifurcation and proximal right ICA with mild proximal right ICA stenosis probably 20-30% in degree. No high-grade or tandem stenosis or dissection.    LEFT CAROTID: Nonstenotic calcification mid/distal left CCA eccentrically. Calcification at the bifurcation and proximal left ICA with mild to moderate proximal left ICA narrowing 30-50% series 10 image 261. No additional or tandem ICA stenosis is   present. No dissection.    VERTEBRAL ARTERIES: No focal stenosis or dissection. Balanced vertebral arteries.    AORTIC ARCH: Classic aortic arch anatomy with no significant stenosis at the origin of the great vessels. Tortuosity without stenosis of the right innominate arterial origin and proximal subclavian arteries bilaterally.    NONVASCULAR STRUCTURES: No pathologic enhancement is evident intracranially. No pathologic orbital enhancement with procedural changes both globes. Partial opacification paranasal sinuses. No evidence for mass or adenopathy within the neck soft tissues.   Multilevel degenerative cervical spondylosis. No severe spinal stenosis is present with a few levels of mild to moderate foraminal compromise. Asymmetric coarsened infiltrate throughout much of the right mid and upper lung with some emphysematous changes   left lung. Correlate for unilateral right-sided pneumonitis. Possible area of focal infiltrate or volume of the  azygos at the right retrotracheal level. Sternotomy. Patent airway. Satisfactory cervical prevertebral soft tissues.      Impression    IMPRESSION:   HEAD CT:  1.  Described separately.    HEAD CTA:   1.  No hemodynamic stenosis. No evidence for large vessel occlusion, dissection or aneurysm. Satisfactory peripheral branch arborization YASHIRA, MCA and PCA vascular territory. No pathologic enhancement. Dural venous sinus patterns of enhancement are   normal.    NECK CTA:  1.  No high-grade stenosis or dissection. Mild narrowing ICA origins bilaterally.    2.  Infiltrate right mid and upper lung noted.    3.  See above for details and description.    4.  Discussed with Dr. Montiel at 2:16 PM on 10/09/2022.     CT Head Perfusion w Contrast    Narrative    EXAM: CT HEAD PERFUSION W CONTRAST  LOCATION: [facility]  DATE/TIME: 10/9/2022 2:15 PM    INDICATION: Altered mental status  COMPARISON: CT/CT angiogram head and neck performed today  TECHNIQUE: CT cerebral perfusion was performed utilizing a second contrast bolus. Perfusion data were post processed with generation of standard perfusion maps and estimation of ischemic/infarcted volumes utilizing standard threshold values. Dose   reduction techniques were used. All stenosis measurements made according to NASCET criteria unless otherwise specified.  CONTRAST: 50mL Isovue 370  COMPARISON: None.    FINDINGS:     CT PERFUSION:  PERFUSION MAPS: Symmetrical cerebral perfusion. No focal deficits in cerebral blood flow or volume to suggest ischemia/oligemia.    RAPID ANALYSIS:  CBF<30%: 0 mL  Tmax>6sec: 0 mL  Mismatch volume: 0 mL  Mismatch ratio: None    CT PERFUSION:  1.  Symmetric cerebral perfusion.. No focal perfusion defects or cerebral blood flow/volume are noted.   XR Chest 2 Views    Narrative    EXAM: XR CHEST 2 VIEWS  LOCATION: [facility]  DATE/TIME: 10/09/2022, 4:02 PM    INDICATION: AMS.  COMPARISON: 08/27/2022.       Impression    IMPRESSION: Patchy opacifications throughout the right hemithorax have clearly worsened since previous exam, especially the right upper lobe. Findings concerning for pneumonia, perhaps with superimposed chronic pulmonary disease. Heart size is normal.   Subtle opacities in the left lung are relatively unchanged. Median sternotomy wires, unchanged.     CT Abdomen Pelvis w/o Contrast    Narrative    EXAM: CT ABDOMEN PELVIS W/O CONTRAST  LOCATION: [facility]  DATE/TIME: 10/9/2022 4:29 PM    INDICATION: fever, ams  COMPARISON: Same day chest radiograph, CT 04/10/2022, 08/16/2020  TECHNIQUE: CT scan of the abdomen and pelvis was performed without IV contrast. Multiplanar reformats were obtained. Dose reduction techniques were used.  CONTRAST: None.    FINDINGS:   LOWER CHEST: Partially visualized airspace disease in the right lower and middle lobes. There is a small right pleural effusion with thickened and enhancing pleura noted (series 2 image 32).    HEPATOBILIARY: Multiple small well-circumscribed hypodense hepatic lesions, likely cysts, present to some degree since at least 2011, no specific follow-up recommended.    PANCREAS: Normal.    SPLEEN: Normal.    ADRENAL GLANDS: Normal.    KIDNEYS/BLADDER: Persistent nephrograms with excreted contrast in the renal collecting system bilaterally. Nonspecific perinephric stranding. No hydronephrosis. Urinary bladder is distended with mild wall thickening.    BOWEL: No obstruction or inflammatory change.    LYMPH NODES: Normal.    VASCULATURE: Unchanged saccular infrarenal abdominal aortic aneurysm measuring up to 3.9 cm (series 2 image 124). Severe calcified atherosclerosis.    PELVIC ORGANS: Prostatomegaly with likely prior TURP.    MUSCULOSKELETAL: No acute bony abnormality. Healed right rib fractures. Mild body wall edema.        Impression    IMPRESSION:   1.  Sequela of chronic bladder outlet obstruction without hydronephrosis.  Nonspecific perinephric stranding. Recommend correlation with urinalysis.  2.  No additional infectious source visualized in the abdomen or pelvis.  3.  Partially visualized airspace disease in the right lung with small right pleural effusion with pleural thickening and enhancement, suggestive of exudative effusion, developing empyema is possible.     MR Brain w/o Contrast    Narrative    EXAM: MR BRAIN W/O CONTRAST  LOCATION: [facility]  DATE/TIME: 10/9/2022 6:52 PM    INDICATION: Encephalopathy with questionable left sided focal weakness  COMPARISON: CT head 10/09/2022  TECHNIQUE: Routine multiplanar multisequence head MRI without intravenous contrast.    FINDINGS:  INTRACRANIAL CONTENTS: No acute or subacute infarct. No mass, acute hemorrhage, or extra-axial fluid collections. Patchy nonspecific T2/FLAIR hyperintensities within the cerebral white matter most consistent with mild to moderate chronic microvascular   ischemic change. Mild to moderate generalized cerebral atrophy. No hydrocephalus. Normal position of the cerebellar tonsils.     SELLA: No abnormality accounting for technique.    OSSEOUS STRUCTURES/SOFT TISSUES: Normal marrow signal. The major intracranial vascular flow voids are maintained.     ORBITS: No abnormality accounting for technique.     SINUSES/MASTOIDS: Mild to moderate mucosal thickening scattered about the paranasal sinuses. No middle ear or mastoid effusion.       Impression    IMPRESSION:  1.  No acute intracranial process.  2.  Generalized brain atrophy and presumed microvascular ischemic changes as detailed above.

## 2022-10-10 NOTE — PROGRESS NOTES
10/10/22 1809   Appointment Info   Signing Clinician's Name / Credentials (SLP) Erma Reyes MS CCC-SLP   General Information   Onset of Illness/Injury or Date of Surgery 10/09/22   Referring Physician Aniket Bowden MD   Patient/Family Therapy Goal Statement (SLP) To have some ice water   Pertinent History of Current Problem The pt is an 89 year old male with chronic atrial fibrillation, history of PE, CKD 3, cognitive impairment, dysphagia, colonic AVMs, s/p TAVR for aortic stenosis who presents on 10/9/2022 due to decreased responsiveness. He was recently hospitalized from 8/23 -9/8 due to acute GI bleed with hemoglobin of 5.8, thought to be due to colonic AVMs.  He was transfused 2 units of PRBCs, EGD did not show any bleeding and colonoscopy was deferred due to delirium.  Anticoagulation was discontinued.  While hospitalized he developed right-sided pneumonia thought to be due to aspiration.  Speech pathology was consulted but patient did not tolerate video swallow study and did not tolerate modified diet.  Palliative care was consulted for goals of care discussion and family decided to continue aggressive cares and full CODE STATUS. He had episode of emesis 1 day PTA,  but overall did well.  He also had decreased urine output yesterday,  Presented to ED with decrease responsiveness,   Noted to have urinary retention and Suazo catheter was placed.  Further work-up showed sepsis-due to worsening right-sided pneumonia and probable acute cystitis. Clinical swallow evaluation ordered per MD d/t concern for aspiration pneumonia. Of note, he had emesis day before admission and reports that he thinks he aspirated.   General Observations The pt is pleasant and agreeable to evaluation. He is somewhat confused during conversation   Pain Assessment   Patient Currently in Pain No   Type of Evaluation   Type of Evaluation Swallow Evaluation   Oral Motor   Oral Musculature generally intact   Structural  "Abnormalities none present   Mucosal Quality dry   Dentition (Oral Motor)   Dentition (Oral Motor) significant number of missing teeth;dental appliance/dentures   Dental Appliance/Denture (Oral Motor) upper and lower;dentures, partial;dentures, full   Facial Symmetry (Oral Motor)   Facial Symmetry (Oral Motor) WNL   Lip Function (Oral Motor)   Lip Range of Motion (Oral Motor) WNL   Tongue Function (Oral Motor)   Tongue ROM (Oral Motor) WNL   Jaw Function (Oral Motor)   Jaw Function (Oral Motor) WNL   Cough/Swallow/Gag Reflex (Oral Motor)   Volitional Throat Clear/Cough (Oral Motor) WNL   Volitional Swallow (Oral Motor) WNL   Vocal Quality/Secretion Management (Oral Motor)   Vocal Quality (Oral Motor) hoarse;WFL   General Swallowing Observations   Past History of Dysphagia The pt is familiar to inpatient SLP department. He was seen during last admission and underwent limited VFSS on 8/31 which reported: \"Pt is lethargic and had limited participation despite max verbal and tactile cues. Pt consumed one teaspoon of mildly thick liquids. Bolus holding, extremely prolonged oral phase with limited effort d/t pt's lethargy. Eventually pt did swallow without penetration or aspiration. Swallow was delayed, adequate epiglottic inversion was achieved and adequate pharyngeal clearance. Limited video swallow evaluation. Continue current diet - minced and moist (5) and mildly thick liquids (2) only when alert and upright with 1:1 assistance. SLP will continue to follow at bedside.\" The pt advanced to soft solids and thin liquids at time of dishcarge. There was recommendation for further GI workup given concern for esophageal dysphagia, however no further assessment per chart review.   Respiratory Support (General Swallowing Observations) nasal cannula  (3 lpm)   Current Diet/Method of Nutritional Intake (General Swallowing Observations, NIS) NPO   Swallowing Evaluation Clinical swallow evaluation   Clinical Swallow Evaluation "   Feeding Assistance no assistance needed   Clinical Swallow Evaluation Textures Trialed thin liquids;solid foods   Clinical Swallow Eval: Thin Liquid Texture Trial   Mode of Presentation, Thin Liquids straw;cup;self-fed   Volume of Liquid or Food Presented 9 oz   Oral Phase of Swallow WFL   Pharyngeal Phase of Swallow intact;reduction in laryngeal movement   Diagnostic Statement No overt s/s of aspiration. He had subtle de-sat with consecutive sips of thin liquid via straw, however suspect pulse ox was getting a poor read. Vocal quality WFL.   Clinical Swallow Evaluation: Solid Food Texture Trial   Mode of Presentation self-fed   Volume Presented 5 crackers   Oral Phase impaired mastication  (mildly prolonged)   Pharyngeal Phase coughing/choking   Diagnostic Statement Dry cough x1 noted with one bite, also simliar to cough in absence of PO. No additional s/s of aspiration. He had mildly prolonged mastication of cracker which reduced with liquid wash or double swallow.   Esophageal Phase of Swallow   Patient reports or presents with symptoms of esophageal dysphagia Yes   Esophageal comments Known hx of reflux.   Swallowing Recommendations   Diet Consistency Recommendations thin liquids (level 0)   Supervision Level for Intake close supervision needed   Mode of Delivery Recommendations bolus size, small;slow rate of intake;no straws   Swallowing Maneuver Recommendations alternate food and liquid intake   Monitoring/Assistance Required (Eating/Swallowing) monitor for cough or change in vocal quality with intake;stop eating activities when fatigue is present   Recommended Feeding/Eating Techniques (Swallow Eval) maintain upright sitting position for eating;maintain upright posture during/after eating for 30 minutes   Medication Administration Recommendations, Swallowing (SLP) Per pt preference   Instrumental Assessment Recommendations VFSS (videofluoroscopic swallowing study)   General Therapy Interventions   Planned  Therapy Interventions Dysphagia Treatment   Dysphagia treatment Modified diet education;Instruction of safe swallow strategies;Compensatory strategies for swallowing   Clinical Impression   Criteria for Skilled Therapeutic Interventions Met (SLP Eval) Yes, treatment indicated   SLP Diagnosis Mild oropharyngeal vs esophageal dysphagia   Risks & Benefits of therapy have been explained evaluation/treatment results reviewed;care plan/treatment goals reviewed;risks/benefits reviewed;current/potential barriers reviewed;participants voiced agreement with care plan;participants included;patient   Clinical Impression Comments Clinical swallow evaluation completed per MD order. The pt presents with suspect mild oropharyngeal dysphagia. Oral mech significant for mildly hoarse vocal quality and upper/lower dentures present. He endorses coughing/aspiration 2 days ago which led to emesis, however reports otherwise no difficulties with swallow function. He consumed thin liquids and regular solids with no overt s/s of aspiration. Dry cough x1 with bite of cracker, however also similar to cough in absence of PO and suspect unrelated to swallow function. He had subtle de-saturation when drinking water x1, however question whether the pulse ox had accurate read, but concern for silent aspiration is present. Mastication was mildly prolonged with regular solids, however the pt cleared minimal oral residuals with liquid wash or second swallow as needed. Recommend easy-to-chew solids and thin liquids with upright positioning and distant supervision. Recommend slow pace, small bites/sips, and alternate liquids/solids. The pt is agreeable to VFSS to further assess pharyngeal phase given concern for silent aspiration.   SLP Total Evaluation Time   Eval: oral/pharyngeal swallow function, clinical swallow Minutes (71508) 20   SLP Discharge Planning   SLP Plan VFSS to assess for silent aspiration, diet tolerance   SLP Discharge Recommendation  home;Transitional Care Facility   SLP Rationale for DC Rec May reach swallow goals prior to D/C   SLP Brief overview of current status  Recommend easy-to-chew solids and thin liquids with upright positioning and distant supervision. Recommend slow pace, small bites/sips, and alternate liquids/solids. The pt is agreeable to VFSS to further assess pharyngeal phase given concern for silent aspiration.

## 2022-10-10 NOTE — ED NOTES
0710: Report received from CECILIA Warner. Care of patient assumed.     0718: Report given to CECILIA Sanon. Care of patient relinquished.

## 2022-10-11 ENCOUNTER — APPOINTMENT (OUTPATIENT)
Dept: PHYSICAL THERAPY | Facility: CLINIC | Age: 87
DRG: 871 | End: 2022-10-11
Attending: INTERNAL MEDICINE
Payer: COMMERCIAL

## 2022-10-11 ENCOUNTER — APPOINTMENT (OUTPATIENT)
Dept: SPEECH THERAPY | Facility: CLINIC | Age: 87
DRG: 871 | End: 2022-10-11
Payer: COMMERCIAL

## 2022-10-11 ENCOUNTER — APPOINTMENT (OUTPATIENT)
Dept: GENERAL RADIOLOGY | Facility: CLINIC | Age: 87
DRG: 871 | End: 2022-10-11
Attending: INTERNAL MEDICINE
Payer: COMMERCIAL

## 2022-10-11 LAB
ALBUMIN SERPL-MCNC: 2.1 G/DL (ref 3.4–5)
ANION GAP SERPL CALCULATED.3IONS-SCNC: 4 MMOL/L (ref 3–14)
BACTERIA UR CULT: NO GROWTH
BASOPHILS # BLD AUTO: 0 10E3/UL (ref 0–0.2)
BASOPHILS NFR BLD AUTO: 0 %
BUN SERPL-MCNC: 25 MG/DL (ref 7–30)
CALCIUM SERPL-MCNC: 8 MG/DL (ref 8.5–10.1)
CHLORIDE BLD-SCNC: 112 MMOL/L (ref 94–109)
CO2 SERPL-SCNC: 25 MMOL/L (ref 20–32)
CREAT SERPL-MCNC: 1.06 MG/DL (ref 0.66–1.25)
EOSINOPHIL # BLD AUTO: 0.4 10E3/UL (ref 0–0.7)
EOSINOPHIL NFR BLD AUTO: 3 %
ERYTHROCYTE [DISTWIDTH] IN BLOOD BY AUTOMATED COUNT: 15.5 % (ref 10–15)
GFR SERPL CREATININE-BSD FRML MDRD: 67 ML/MIN/1.73M2
GLUCOSE BLD-MCNC: 101 MG/DL (ref 70–99)
HCT VFR BLD AUTO: 24.7 % (ref 40–53)
HGB BLD-MCNC: 7.5 G/DL (ref 13.3–17.7)
IMM GRANULOCYTES # BLD: 0.1 10E3/UL
IMM GRANULOCYTES NFR BLD: 1 %
LACTATE SERPL-SCNC: 1.1 MMOL/L (ref 0.7–2)
LYMPHOCYTES # BLD AUTO: 0.5 10E3/UL (ref 0.8–5.3)
LYMPHOCYTES NFR BLD AUTO: 4 %
MCH RBC QN AUTO: 28.7 PG (ref 26.5–33)
MCHC RBC AUTO-ENTMCNC: 30.4 G/DL (ref 31.5–36.5)
MCV RBC AUTO: 95 FL (ref 78–100)
MONOCYTES # BLD AUTO: 1.2 10E3/UL (ref 0–1.3)
MONOCYTES NFR BLD AUTO: 8 %
NEUTROPHILS # BLD AUTO: 11.9 10E3/UL (ref 1.6–8.3)
NEUTROPHILS NFR BLD AUTO: 84 %
NRBC # BLD AUTO: 0 10E3/UL
NRBC BLD AUTO-RTO: 0 /100
PHOSPHATE SERPL-MCNC: 2.6 MG/DL (ref 2.5–4.5)
PLATELET # BLD AUTO: 146 10E3/UL (ref 150–450)
POTASSIUM BLD-SCNC: 3.8 MMOL/L (ref 3.4–5.3)
RBC # BLD AUTO: 2.61 10E6/UL (ref 4.4–5.9)
SODIUM SERPL-SCNC: 141 MMOL/L (ref 133–144)
WBC # BLD AUTO: 14.1 10E3/UL (ref 4–11)

## 2022-10-11 PROCEDURE — 80069 RENAL FUNCTION PANEL: CPT | Performed by: INTERNAL MEDICINE

## 2022-10-11 PROCEDURE — 97530 THERAPEUTIC ACTIVITIES: CPT | Mod: GP | Performed by: PHYSICAL THERAPIST

## 2022-10-11 PROCEDURE — 250N000011 HC RX IP 250 OP 636: Performed by: INTERNAL MEDICINE

## 2022-10-11 PROCEDURE — 94640 AIRWAY INHALATION TREATMENT: CPT | Mod: 76

## 2022-10-11 PROCEDURE — 120N000001 HC R&B MED SURG/OB

## 2022-10-11 PROCEDURE — 250N000013 HC RX MED GY IP 250 OP 250 PS 637: Performed by: INTERNAL MEDICINE

## 2022-10-11 PROCEDURE — 92611 MOTION FLUOROSCOPY/SWALLOW: CPT | Mod: GN | Performed by: SPEECH-LANGUAGE PATHOLOGIST

## 2022-10-11 PROCEDURE — 99233 SBSQ HOSP IP/OBS HIGH 50: CPT | Performed by: INTERNAL MEDICINE

## 2022-10-11 PROCEDURE — 83605 ASSAY OF LACTIC ACID: CPT | Performed by: INTERNAL MEDICINE

## 2022-10-11 PROCEDURE — 36415 COLL VENOUS BLD VENIPUNCTURE: CPT | Performed by: INTERNAL MEDICINE

## 2022-10-11 PROCEDURE — 74230 X-RAY XM SWLNG FUNCJ C+: CPT

## 2022-10-11 PROCEDURE — 94640 AIRWAY INHALATION TREATMENT: CPT

## 2022-10-11 PROCEDURE — 93005 ELECTROCARDIOGRAM TRACING: CPT

## 2022-10-11 PROCEDURE — 85004 AUTOMATED DIFF WBC COUNT: CPT | Performed by: INTERNAL MEDICINE

## 2022-10-11 PROCEDURE — 93010 ELECTROCARDIOGRAM REPORT: CPT | Performed by: INTERNAL MEDICINE

## 2022-10-11 PROCEDURE — 92526 ORAL FUNCTION THERAPY: CPT | Mod: GN | Performed by: SPEECH-LANGUAGE PATHOLOGIST

## 2022-10-11 PROCEDURE — 97161 PT EVAL LOW COMPLEX 20 MIN: CPT | Mod: GP | Performed by: PHYSICAL THERAPIST

## 2022-10-11 PROCEDURE — 999N000157 HC STATISTIC RCP TIME EA 10 MIN

## 2022-10-11 PROCEDURE — 250N000009 HC RX 250: Performed by: INTERNAL MEDICINE

## 2022-10-11 RX ORDER — ALFUZOSIN HYDROCHLORIDE 10 MG/1
10 TABLET, EXTENDED RELEASE ORAL DAILY
Status: DISCONTINUED | OUTPATIENT
Start: 2022-10-11 | End: 2022-10-14

## 2022-10-11 RX ORDER — DOXYCYCLINE 100 MG/1
100 CAPSULE ORAL EVERY 12 HOURS
Status: DISCONTINUED | OUTPATIENT
Start: 2022-10-11 | End: 2022-10-14

## 2022-10-11 RX ORDER — MEROPENEM 1 G/1
1 INJECTION, POWDER, FOR SOLUTION INTRAVENOUS EVERY 8 HOURS
Status: DISCONTINUED | OUTPATIENT
Start: 2022-10-11 | End: 2022-10-17 | Stop reason: HOSPADM

## 2022-10-11 RX ORDER — BARIUM SULFATE 400 MG/ML
5 SUSPENSION ORAL ONCE
Status: COMPLETED | OUTPATIENT
Start: 2022-10-11 | End: 2022-10-11

## 2022-10-11 RX ADMIN — MEROPENEM 1 G: 1 INJECTION, POWDER, FOR SOLUTION INTRAVENOUS at 18:06

## 2022-10-11 RX ADMIN — UMECLIDINIUM 1 PUFF: 62.5 AEROSOL, POWDER ORAL at 08:26

## 2022-10-11 RX ADMIN — IPRATROPIUM BROMIDE AND ALBUTEROL SULFATE 3 ML: .5; 3 SOLUTION RESPIRATORY (INHALATION) at 15:52

## 2022-10-11 RX ADMIN — DOXYCYCLINE 100 MG: 100 INJECTION, POWDER, LYOPHILIZED, FOR SOLUTION INTRAVENOUS at 05:07

## 2022-10-11 RX ADMIN — ALFUZOSIN HYDROCHLORIDE 10 MG: 10 TABLET, EXTENDED RELEASE ORAL at 13:42

## 2022-10-11 RX ADMIN — BARIUM SULFATE 15 ML: 400 SUSPENSION ORAL at 09:55

## 2022-10-11 RX ADMIN — FAMOTIDINE 20 MG: 10 INJECTION INTRAVENOUS at 08:26

## 2022-10-11 RX ADMIN — MEROPENEM 1 G: 1 INJECTION, POWDER, FOR SOLUTION INTRAVENOUS at 10:27

## 2022-10-11 RX ADMIN — IPRATROPIUM BROMIDE AND ALBUTEROL SULFATE 3 ML: .5; 3 SOLUTION RESPIRATORY (INHALATION) at 12:03

## 2022-10-11 RX ADMIN — IPRATROPIUM BROMIDE AND ALBUTEROL SULFATE 3 ML: .5; 3 SOLUTION RESPIRATORY (INHALATION) at 08:43

## 2022-10-11 RX ADMIN — DOXYCYCLINE HYCLATE 100 MG: 100 CAPSULE ORAL at 18:05

## 2022-10-11 RX ADMIN — MEROPENEM 500 MG: 500 INJECTION, POWDER, FOR SOLUTION INTRAVENOUS at 01:00

## 2022-10-11 RX ADMIN — FLUTICASONE FUROATE AND VILANTEROL TRIFENATATE 1 PUFF: 200; 25 POWDER RESPIRATORY (INHALATION) at 08:26

## 2022-10-11 ASSESSMENT — ACTIVITIES OF DAILY LIVING (ADL)
ADLS_ACUITY_SCORE: 43

## 2022-10-11 NOTE — PROGRESS NOTES
[facility]    Hospitalist Progress Note    Brief Summary:  Hermilo Velasquez is a 89 year old male with chronic atrial fibrillation, history of PE, CKD 3, cognitive impairment, dysphagia, colonic AVMs, s/p TAVR for aortic stenosis who presents on 10/9/2022 due to decreased responsiveness.      He was recently hospitalized from 8/23 -9/8 due to acute GI bleed with hemoglobin of 5.8, thought to be due to colonic AVMs.  He was transfused 2 units of PRBCs, EGD did not show any bleeding and colonoscopy was deferred due to delirium.  Anticoagulation was discontinued.  While hospitalized he developed right-sided pneumonia thought to be due to aspiration.  Speech pathology was consulted but patient did not tolerate video swallow study and did not tolerate modified diet.  Palliative care was consulted for goals of care discussion and family decided to continue aggressive cares and full CODE STATUS.     He had episode of emesis 1 day PTA,  but overall did well.  He also had decreased urine output yesterday,  Presented to ED with decrease responsiveness,   Noted to have urinary retention and Suazo catheter was placed.  Further work-up showed sepsis-due to worsening right-sided pneumonia and probable acute cystitis.    Assessment & Plan         #.  Severe sepsis-present on admission-source right sided pneumonia versus aspiration pneumonia and   UTI  #.  Acute Metabolic encephalopathy-secondary to sepsis: resolved.     -With low blood pressure of systolic 80s, leukocytosis of 25, acute metabolic encephalopathy,  ARF patient meets criteria for severe sepsis on admission, patient is now awake, alert and oriented and breathing comfortably  At this time.   -Is responding to IV fluids-blood pressure now improve with that, renal failure resolved, eating orally now, stop IV fluids  -Lactate is normal.  CRP and procalcitonin elevated on admission   -Chest x-ray shows worsening right-sided infiltrate.  Urine is  mildly abnormal as well concerning for UTI  -Started on IV cefepime and doxycycline after blood cultures were obtained.  - Stop IV Cefepime as allergic to PCN start on IV Meropenem to cover for aspiration as well.   IS, flutter.   -Continue IV antibiotics, Blood culture negative so far.   Stop IV fluids now.      #.  Aspiration pneumonia versus worsening right-sided pneumonia  #.  Dysphagia  -Was treated with IV cefepime and doxycycline during recent hospitalization due to hospital-acquired pneumonia with concern for aspiration, restarted on same medication, give possibility of aspiration will  Change cefepime to Meropenem on 10/10  -X-ray now shows worsening infiltrate in right lung  - did have episode of emesis day before admission-has known dysphagia   Consult speech for evaluation, much  More awake and alert now and wanted to eat,  Patient is started on oral diet on 10/10 and schedule for Video swallow today   -continue on  DuoNeb as well as some wheezing on exam.      #.  UTI with hematuria  #.  Acute urinary retention  -Rodriguez catheter placed in ER with return of 800 mL of urine.  Initially there were blood and blood clots but these have not cleared  -His UA is abnormal with mild pyuria  -IV antibiotics as above  -Continue Rodriguez catheter  -start him on Proscar and Flomax , keep rodriguez catheter in for now, will give him voiding trial in a day or two   Urine culture no growth      #.  Recent acute GI bleed suspected to be due to colonic AVMs-August 2022  -Hemoglobin stable and at baseline, hemoglobin 8.5  -Anticoagulation was discontinued at that time     #.  Cognitive impairment  -Goals of care were discussed during previous admission.  Family chose to keep him full code and all aggressive treatments  Stable at this time.      #.  Chronic atrial fibrillation  #.  History of pulmonary embolism  -Hold Toprol-XL due to hypotension, resume it from tomorrow if remain stable.   -Anticoagulation discontinued during  previous admission due to GI bleed     #.  S/p TAVR for aortic stenosis  TTE (8/22): EF 50-55%, unable to evaluate WMA due to limited study; mean AV gradient 14 mmHg  Stable           DVT Prophylaxis: Pneumatic Compression Devices  Code Status: Full Code    Disposition: Expected discharge in 2 days once stable    Aniket Bowden MD, MD  Text Page  (7am - 6pm)    Interval History   Patient seen and evaluated this morning, feeling well, eating well as well, denies any chest pain, SOB, fever, chills, nausea,  Vomiting, headache or dizziness at this time.     No other significant event overnight.     -Data reviewed today: I reviewed all new labs and imaging results over the last 24 hours. I personally reviewed no images or EKG's today.    Physical Exam   Temp: 98.1  F (36.7  C) Temp src: Oral BP: 123/66 Pulse: 74   Resp: 23 SpO2: 95 % O2 Device: Nasal cannula Oxygen Delivery: 4 LPM  Vitals:    10/09/22 1338 10/11/22 0523   Weight: 70.8 kg (156 lb 1.4 oz) 76.7 kg (169 lb 1.5 oz)     Vital Signs with Ranges  Temp:  [98.1  F (36.7  C)-98.3  F (36.8  C)] 98.1  F (36.7  C)  Pulse:  [71-93] 74  Resp:  [18-37] 23  BP: (106-123)/(52-66) 123/66  SpO2:  [92 %-98 %] 95 %  I/O last 3 completed shifts:  In: 2442 [P.O.:340; I.V.:2102]  Out: 1075 [Urine:1075]    Constitutional: awake, alert, cooperative, no apparent distress, and appears stated age  Eyes: Lids and lashes normal, pupils equal, round and reactive to light, extra ocular muscles intact, sclera clear, conjunctiva normal  Respiratory: No increased work of breathing, right sided crackles and wheezing, left side clear  Cardiovascular: Normal apical impulse, regular rate and rhythm, normal S1 and S2, no S3 or S4, and no murmur noted  GI: No scars, normal bowel sounds, soft, non-distended, non-tender, no masses palpated, no hepatosplenomegally  Musculoskeletal: There is no redness, warmth, or swelling of the joints.  Full range of motion noted.  Motor strength is 5 out of 5 all  extremities bilaterally.  Tone is normal.  Neurologic: Awake, alert, no focal deficit.     Medications       alfuzosin ER  10 mg Oral Daily     doxycycline  100 mg Intravenous Q12H     famotidine  20 mg Intravenous Daily     fluticasone-vilanterol  1 puff Inhalation Daily     ipratropium - albuterol 0.5 mg/2.5 mg/3 mL  3 mL Nebulization 4x daily     meropenem  1 g Intravenous Q8H     sodium chloride (PF)  3 mL Intracatheter Q8H     umeclidinium  1 puff Inhalation Daily       Data   Recent Labs   Lab 10/11/22  0518 10/10/22  0700 10/09/22  1353   WBC 14.1* 22.1* 25.3*   HGB 7.5* 8.7* 8.5*   MCV 95 99 97   * 165 194   INR  --   --  1.19*    139 143   POTASSIUM 3.8 4.1 4.0   CHLORIDE 112* 111* 114*   CO2 25 25 27   BUN 25 29 23   CR 1.06 1.42* 1.57*   ANIONGAP 4 3 2*   AMRITA 8.0* 7.7* 7.6*   * 113* 118*   ALBUMIN 2.1* 2.3*  --    PROTTOTAL  --  5.6*  --    BILITOTAL  --  1.0  --    ALKPHOS  --  66  --    ALT  --  12  --    AST  --  24  --        Recent Results (from the past 24 hour(s))   XR Video Swallow with SLP or OT    Narrative    VIDEO SWALLOWING EVALUATION   10/11/2022 9:57 AM     HISTORY: Concern for silent aspiration.    COMPARISON: None.    FLUOROSCOPY TIME: 1.8 minutes     Number of cine runs: 9    FINDINGS:    Thin: Moderate penetration. Mild residue.    Slightly thick: Not administered.    Mildly thick: Mild residue. Otherwise normal.    Moderately thick: Not administered.    Puree: Normal.    Semisolid: Not administered.    Regular: Not administered.    This study only includes the cervical esophagus.     SMITA CHAVEZ MD         SYSTEM ID:  [id number]

## 2022-10-11 NOTE — PROGRESS NOTES
"  SLP VFSS Report 10/11/22 1024   Appointment Info   Signing Clinician's Name / Credentials (SLP) Deysi Mane MA [facility] SLP   General Information   Onset of Illness/Injury or Date of Surgery 10/09/22   Referring Physician Aniket Bowden MD   Patient/Family Therapy Goal Statement (SLP) To have some ice water   Pertinent History of Current Problem \"The pt is an 89 year old male with chronic atrial fibrillation, history of PE, CKD 3, cognitive impairment, dysphagia, colonic AVMs, s/p TAVR for aortic stenosis who presents on 10/9/2022 due to decreased responsiveness. He was recently hospitalized from 8/23 -9/8 due to acute GI bleed with hemoglobin of 5.8, thought to be due to colonic AVMs.  He was transfused 2 units of PRBCs, EGD did not show any bleeding and colonoscopy was deferred due to delirium.  Anticoagulation was discontinued.  While hospitalized he developed right-sided pneumonia thought to be due to aspiration.  Speech pathology was consulted but patient did not tolerate video swallow study and did not tolerate modified diet.  Palliative care was consulted for goals of care discussion and family decided to continue aggressive cares and full CODE STATUS. He had episode of emesis 1 day PTA,  but overall did well.  He also had decreased urine output yesterday,  Presented to ED with decrease responsiveness,   Noted to have urinary retention and Suazo catheter was placed.  Further work-up showed sepsis-due to worsening right-sided pneumonia and probable acute cystitis. Clinical swallow evaluation ordered per MD d/t concern for aspiration pneumonia. Of note, he had emesis day before admission and reports that he thinks he aspirated.\"   General Observations Mod-max cues needed to follow complex commands at times.  Pt disliked pudding texture during study and stated he felt he was going to throw up after 2nd tsp placed in oral cavity. Pt spit out bolus in mouth and then produced an emesis from feeling nauseous. "  No further barium trials completed due to emesis.   General Swallowing Observations   Swallowing Evaluation Videofluoroscopic swallow study (VFSS)   VFSS Evaluation   Radiologist Dr. Brink   Views Taken left lateral   Physical Location of Procedure FSH   VFSS Textures Trialed thin liquids;mildly thick liquids;pureed   VFSS Eval: Thin Liquid Texture Trial   Mode of Presentation, Thin Liquid cup;spoon;straw   Order of Presentation 4, 5, 6, 7, 8, 9   Preparatory Phase poor bolus control   Oral Phase, Thin Liquid premature pharyngeal entry;residue in oral cavity   Bolus Location When Swallow Triggered pyriforms   Pharyngeal Phase, Thin Liquid impaired hyolaryngeal excursion;impaired epiglottic movement;impaired tongue base retraction;residue in vallecula;impaired pharyngoesophageal segment opening  (min to mild-mod pharyngeal residue)   Rosenbek's Penetration Aspiration Scale: Thin Liquid Trial Results 3 - contrast remains above the vocal cords, visible residue remains (penetration)   Diagnostic Statement Min flash penetration by cup x 1 and mod penetration with residual by cup x 1; no penetration with a chin tuck (mod-max cues needed), but some increased pharyngeal residue noted; 2nd swallow helped to clear residue; Min reflux/residue above UES   VFSS Eval: Mildly Thick Liquids   Mode of Presentation spoon;cup   Order of Presentation 2, 3 (1 - no trial)   Preparatory Phase WFL   Oral Phase residue in oral cavity   Bolus Location When Swallow Triggered posterior angle of ramus   Pharyngeal Phase impaired hyolaryngel excursion;impaired epiglottic movement;impaired tongue base retraction;impaired pharyngoesophageal segment opening;residue in vallecula  (min to min-mild pharyngeal residue)   Rosenbek's Penetration Aspiration Scale 1 - no aspiration, contrast does not enter airway   Diagnostic Statement Min reflux/residue above UES   VFSS Evaluation: Puree Solid Texture Trial   Mode of Presentation, Puree spoon   Order of  Presentation 10 (11-pt spit bolus back out)   Preparatory Phase WFL   Oral Phase, Puree residue in oral cavity   Bolus Location When Swallow Triggered posterior angle of ramus   Pharyngeal Phase, Puree impaired hyolaryngel excursion;impaired epiglottic movement;impaired tongue base retraction;residue in vallecula;impaired pharyngoesophageal segment opening  (min pharyngeal residue)   Rosenbek's Penetration Aspiration Scale: Puree Food Trial Results 1 - no aspiration, contrast does not enter airway   Diagnostic Statement Min reflux/residue above UES   Esophageal Phase of Swallow   Patient reports or presents with symptoms of esophageal dysphagia Yes   Esophageal comments Known hx of reflux.  Emesis after pudding trials.  Tight UES with slight reflux/residue.   Swallowing Recommendations   Diet Consistency Recommendations thin liquids (level 0);easy to chew (level 7)   Supervision Level for Intake 1:1 supervision needed   Mode of Delivery Recommendations bolus size, small;slow rate of intake;no straws   Postural Recommendations chin tuck  (and 2 swallows per sip of thin liquids)   Swallowing Maneuver Recommendations alternate food and liquid intake;extra swallow   Monitoring/Assistance Required (Eating/Swallowing) monitor for cough or change in vocal quality with intake   Recommended Feeding/Eating Techniques (Swallow Eval) maintain upright sitting position for eating;provide 6 smaller meals throughout day  (stay up for 60 minutes after po)   Medication Administration Recommendations, Swallowing (SLP) Meds with small sips of thin and a chin tuck, crush and use puree as needed   Comment, Swallowing Recommendations cough-swallow if wet voicing noted, hold po if increased aspiration signs/respiratory status declines/emesis observed   General Therapy Interventions   Planned Therapy Interventions Dysphagia Treatment   Dysphagia treatment Modified diet education;Instruction of safe swallow strategies;Compensatory strategies  for swallowing   Clinical Impression   Criteria for Skilled Therapeutic Interventions Met (SLP Eval) Yes, treatment indicated   SLP Diagnosis Mild oral-pharyngeal dysphagia, emesis during study - possible increased esophageal dysphagia as source   Risks & Benefits of therapy have been explained evaluation/treatment results reviewed;care plan/treatment goals reviewed;risks/benefits reviewed;current/potential barriers reviewed;participants voiced agreement with care plan;participants included;patient   Clinical Impression Comments Mild oral-pharyngeal dysphagia and emesis after pudding trial were observed during today's study.  Recommend consideration of GI consult given emesis observed, report of emesis prior to admit, and concerns for possible aspiration of reflux/emesis.  Oral-pharyngeal deficits include mild oral residue, mild decreased epiglottic closure, hyoid elevation, and base of tongue function, and tight UES.  Deficits resulted in min to mild-mod pharyngeal residue and min flash to mod penetration with residual of thin liquids by cup.  Mod-max cues to use a chin tuck eliminated penetration.  From an SLP standpoint, recommend a continued IDDSI Diet Level 7 and thin liquids with supervision/cues to use safe swallow strategies including a chin tuck and 2 swallows per sip of thin liquids and reflux precautions.  Plan to continue Tx to train strategies and assess diet tolerance.  Will defer to MD for GI follow up as felt indicated.   SLP Total Evaluation Time   Evaluation, videofluoroscopic eval of swallow function Minutes (61733) 15   SLP Goals   SLP Predicted Duration/Target Date for Goal Attainment 10/17/22   Swallowing Dysfunction &/or Oral Function for Feeding   Treatment of Swallowing Dysfunction &/or Oral Function for Feeding Minutes (02225) 10   Symptoms Noted During/After Treatment None   Treatment Detail/Skilled Intervention Educated pt on current deficits, recommended use of precautions/strategies  with po intake. Pt will need continued education due to decreased recall/attention.  Pt followed mod cues in swallow Tx to use a chin tuck with thin liquids x 2 with thin water.  No overt aspiration signs observed.  Educated MD/RN regarding recommendations for precautions and GI consult   SLP Discharge Planning   SLP Plan Meal f/u, train strategies   SLP Discharge Recommendation home;Transitional Care Facility   SLP Rationale for DC Rec Short term SLP Tx after d/c to train pt/caregiver strategies; reminders needed for use of strategies   SLP Brief overview of current status  Mild oral-pharyngeal dysphagia; emesis during study - ? GI function; Rec: continue an IDDSI Diet Level 7 and thin liquids with a chin tuck and 2 swallows, GERD precautions; see diet order for details   Total Session Time   Total Session Time (sum of timed and untimed services) 25

## 2022-10-11 NOTE — PROVIDER NOTIFICATION
MD Notification    Notified Person: MD    Notified Person Name: Aniket Bowden    Notification Date/Time: 10/11/22 1813    Notification Interaction: Vocera Messaging    Purpose of Notification: Requesting tums for pt c/o heartburn    Orders Received: No response, will pass message to NOC RN    Comments:

## 2022-10-11 NOTE — PROVIDER NOTIFICATION
Notified provider about indwelling rodriguez catheter discussed removal or continued need.    Did provider choose to remove indwelling rodriguez catheter? No    Provider's rodriguez indication for keeping indwelling rodriguez catheter: Retention.    Is there an order for indwelling rodriguez catheter? Yes    *If there is a plan to keep rodriguez catheter in place at discharge daily notification with provider is not necessary.

## 2022-10-11 NOTE — PLAN OF CARE
Pt. Alert and oriented x4. Vital signs stable on 4L NC. Assist of 1 with gb/walker. Tolerating reg diet. Lung sounds dim. Bowel sounds active, + flatus. Adequate urine output via rodriguez, urine remains pink tinged with blood. Skin intact. Denies pain. Denies nausea. Tele SR. Sepsis protocol fired this evening, lactic pending.

## 2022-10-11 NOTE — PLAN OF CARE
Disoriented to place and time in beginning of shift. VSS. Tele SR. On 4lpm NC. LS diminished with expiratory wheezes. +bs, +flatus, +bm. Incontinent of bowel. Suazo patent with adequate output. Denies pain. Turn and repo q2hr. Up with assist x1 gb and walker. Tolerating diet. IVF. IV abx.

## 2022-10-11 NOTE — PLAN OF CARE
"[id number]  A&O x 4, forgetful. VSS on RA, 4L NC NOC, a-paced, LS diminished, inspiratory wheezes. Denies pain, nausea, SOB. BS audible, BM this shift. Rodriguez in place adequate urine output. Pt needs frequent reminders that rodriguez cannot come out until after provider approval. Reminded pt not to tug on his rodriguez or remove himself. Speech study today, pt had episode of emesis x 1. GI to follow up w/ consult. Diet easy to chew w/ thin liquids and remind pt to tuck chin when swallowing. No straws. Appetite good. Ambulates w/ assist x 1 + GB & W. HgB stable at 7.5 but trending down from yesterday, continue to monitor.    [id number]  Disoriented to place, situation. Pt c/o heartburn, provider contacted to request tums. Pt tried to get out of bed to get another blanket, reminded pt to call to get up. Pt rambling frequently. Pt said \"close the door there are gnats and mosquitos that will come in.\" Asked pt if he is seeing any right now and he replied \"no.\" Re-oriented pt to place and situation. Previous notes indicate pt becomes more confused at sundown.      "

## 2022-10-11 NOTE — PROGRESS NOTES
"   10/11/22 0849   Appointment Info   Signing Clinician's Name / Credentials (PT) Joellen Sandoval DPT   Rehab Comments (PT) monitor sats, today needing 5 L O2, normally on room air except at night at home (2 L O2 at night)   Living Environment   People in Home spouse   Current Living Arrangements house   Home Accessibility stairs to enter home;stairs within home   Number of Stairs, Main Entrance 1   Stair Railings, Main Entrance railings safe and in good condition   Number of Stairs, Within Home, Primary greater than 10 stairs   Stair Railings, Within Home, Primary railings safe and in good condition   Transportation Anticipated family or friend will provide   Living Environment Comments Pt reports he can live on main level, otherwise has > 15 stairs in house. Pt has FWW at home and raised toilet seats   Self-Care   Usual Activity Tolerance good   Current Activity Tolerance fair   Regular Exercise No   Equipment Currently Used at Home walker, rolling   Fall history within last six months yes   Number of times patient has fallen within last six months 3  (pt is questionable historian)   Activity/Exercise/Self-Care Comment Pt normally Mod I with walker but has had \"a few falls\" recently. Pt lives with supportive spouse who helps manage medications. Pt reports independence with all other ADLs, still drives. Overall questionable historian and [facility]   General Information   Onset of Illness/Injury or Date of Surgery 10/09/22   Referring Physician Aniket Bowden MD   Patient/Family Therapy Goals Statement (PT) return home   Pertinent History of Current Problem (include personal factors and/or comorbidities that impact the POC) 89 year old male with chronic atrial fibrillation, history of PE, CKD 3, cognitive impairment, dysphagia, colonic AVMs, s/p TAVR for aortic stenosis who presents on 10/9/2022 due to decreased responsiveness and found to have severe sepsis with concern for R side PNA vs. aspiration PNA and UTI "   Existing Precautions/Restrictions oxygen therapy device and L/min   Weight-Bearing Status - LUE full weight-bearing   Weight-Bearing Status - RUE full weight-bearing   Weight-Bearing Status - LLE full weight-bearing   Weight-Bearing Status - RLE full weight-bearing   General Observations Pt on 5 L O2 today, normally on room air during the day and uses 2 L O2 only at night   Cognition   Affect/Mental Status (Cognition) confused   Orientation Status (Cognition) oriented to;person;place   Follows Commands (Cognition) increased processing time needed;delayed response/completion;verbal cues/prompting required;physical/tactile prompts required;repetition of directions required   Safety Deficit (Cognition) insight into deficits/self-awareness;awareness of need for assistance   Posture    Posture Forward head position;Kyphosis   Strength (Manual Muscle Testing)   Strength (Manual Muscle Testing) Able to perform R SLR;Able to perform L SLR;Deficits observed during functional mobility   Strength Comments generalized weakness bilateral LEs with functional tasks   Bed Mobility   Comment, (Bed Mobility) Min A supine <> sit   Transfers   Comment, (Transfers) Min A sit <> stand with FWW   Gait/Stairs (Locomotion)   [place] Level (Gait) contact guard   Assistive Device (Gait) walker, front-wheeled   Distance in Feet (Required for LE Total Joints) 5' bed <> chair   Balance   Balance Comments impaired balance with hx of recent falls and currently requires FWW and A x 1 for safety   Sensory Examination   Sensory Perception patient reports no sensory changes   Clinical Impression   Criteria for Skilled Therapeutic Intervention Yes, treatment indicated   PT Diagnosis (PT) impaired mobility   Influenced by the following impairments impaired balance, weakness, increased O2 needs, impaired endurance   Functional limitations due to impairments fall risk, decreased activity tolerance   Clinical Presentation (PT Evaluation Complexity)  Stable/Uncomplicated   Clinical Presentation Rationale clinical judgement   Clinical Decision Making (Complexity) low complexity   Planned Therapy Interventions (PT) bed mobility training;gait training;transfer training;stair training   Anticipated Equipment Needs at Discharge (PT) walker, rolling   Risk & Benefits of therapy have been explained patient;evaluation/treatment results reviewed;care plan/treatment goals reviewed;risks/benefits reviewed;current/potential barriers reviewed;participants voiced agreement with care plan;participants included   PT Total Evaluation Time   PT Eval, Low Complexity Minutes (17686) 10   Physical Therapy Goals   PT Frequency Daily   PT Predicted Duration/Target Date for Goal Attainment 10/18/22   PT Goals Bed Mobility;Gait;Transfers;Stairs   PT: Bed Mobility Independent;Supine to/from sit   PT: Transfers Modified independent;Sit to/from stand;Bed to/from chair;Assistive device   PT: Gait Modified independent;Rolling walker;100 feet   PT: Stairs Supervision/stand-by assist;1 stair   PT Discharge Planning   PT Plan progress independence with OOB mobility and FWW, monitor O2 sats with activity, trial steps to enter home safely   PT Discharge Recommendation (DC Rec) Transitional Care Facility;home with assist;home with home care physical therapy   PT Rationale for DC Rec Pt currently below baseline requiring A x 1 with functional mobility and increased O2 needs, therefore recommend TCU to progress independence with mobility and improve safety prior to return to house with supportive spouse. Could consider discharge home pending progress and pt's ability to have increased O2 at home, Ax1 and FWW with mobility, and HHPT to address functional mobility limitations. Pt in favor of discharge home, will continue to assess as medical status improves.   PT Brief overview of current status A x 1 with FWW, 5 L O2 with activity

## 2022-10-11 NOTE — PROVIDER NOTIFICATION
MD Notification    Notified Person: MD    Notified Person Name: Aniket Bowden    Notification Date/Time: 10/11/22, 1530    Notification Interaction: Amb[employer] Micro Messaging    Purpose of Notification: Inquiring if IV vibramycin can be changed to PO med since pt can take meds PO.    Orders Received: Will change vibramycin to oral med.    Comments:

## 2022-10-12 ENCOUNTER — APPOINTMENT (OUTPATIENT)
Dept: SPEECH THERAPY | Facility: CLINIC | Age: 87
DRG: 871 | End: 2022-10-12
Payer: COMMERCIAL

## 2022-10-12 ENCOUNTER — APPOINTMENT (OUTPATIENT)
Dept: PHYSICAL THERAPY | Facility: CLINIC | Age: 87
DRG: 871 | End: 2022-10-12
Payer: COMMERCIAL

## 2022-10-12 LAB
ALBUMIN SERPL-MCNC: 2.2 G/DL (ref 3.4–5)
ANION GAP SERPL CALCULATED.3IONS-SCNC: 4 MMOL/L (ref 3–14)
ATRIAL RATE - MUSE: 256 BPM
BASOPHILS # BLD AUTO: 0 10E3/UL (ref 0–0.2)
BASOPHILS NFR BLD AUTO: 0 %
BUN SERPL-MCNC: 18 MG/DL (ref 7–30)
CALCIUM SERPL-MCNC: 8.4 MG/DL (ref 8.5–10.1)
CHLORIDE BLD-SCNC: 114 MMOL/L (ref 94–109)
CO2 SERPL-SCNC: 26 MMOL/L (ref 20–32)
CREAT SERPL-MCNC: 0.78 MG/DL (ref 0.66–1.25)
DIASTOLIC BLOOD PRESSURE - MUSE: NORMAL MMHG
EOSINOPHIL # BLD AUTO: 0.7 10E3/UL (ref 0–0.7)
EOSINOPHIL NFR BLD AUTO: 7 %
ERYTHROCYTE [DISTWIDTH] IN BLOOD BY AUTOMATED COUNT: 15.2 % (ref 10–15)
GFR SERPL CREATININE-BSD FRML MDRD: 85 ML/MIN/1.73M2
GLUCOSE BLD-MCNC: 102 MG/DL (ref 70–99)
HCT VFR BLD AUTO: 25.7 % (ref 40–53)
HGB BLD-MCNC: 7.9 G/DL (ref 13.3–17.7)
IMM GRANULOCYTES # BLD: 0 10E3/UL
IMM GRANULOCYTES NFR BLD: 0 %
INTERPRETATION ECG - MUSE: NORMAL
LACTATE SERPL-SCNC: 0.7 MMOL/L (ref 0.7–2)
LYMPHOCYTES # BLD AUTO: 0.5 10E3/UL (ref 0.8–5.3)
LYMPHOCYTES NFR BLD AUTO: 5 %
MCH RBC QN AUTO: 29.4 PG (ref 26.5–33)
MCHC RBC AUTO-ENTMCNC: 30.7 G/DL (ref 31.5–36.5)
MCV RBC AUTO: 96 FL (ref 78–100)
MONOCYTES # BLD AUTO: 0.7 10E3/UL (ref 0–1.3)
MONOCYTES NFR BLD AUTO: 7 %
NEUTROPHILS # BLD AUTO: 7.8 10E3/UL (ref 1.6–8.3)
NEUTROPHILS NFR BLD AUTO: 81 %
NRBC # BLD AUTO: 0 10E3/UL
NRBC BLD AUTO-RTO: 0 /100
P AXIS - MUSE: 80 DEGREES
PHOSPHATE SERPL-MCNC: 2.3 MG/DL (ref 2.5–4.5)
PLATELET # BLD AUTO: 173 10E3/UL (ref 150–450)
POTASSIUM BLD-SCNC: 3.7 MMOL/L (ref 3.4–5.3)
PR INTERVAL - MUSE: NORMAL MS
QRS DURATION - MUSE: 146 MS
QT - MUSE: 330 MS
QTC - MUSE: 425 MS
R AXIS - MUSE: -25 DEGREES
RBC # BLD AUTO: 2.69 10E6/UL (ref 4.4–5.9)
SODIUM SERPL-SCNC: 144 MMOL/L (ref 133–144)
SYSTOLIC BLOOD PRESSURE - MUSE: NORMAL MMHG
T AXIS - MUSE: 63 DEGREES
VENTRICULAR RATE- MUSE: 100 BPM
WBC # BLD AUTO: 9.6 10E3/UL (ref 4–11)

## 2022-10-12 PROCEDURE — 97530 THERAPEUTIC ACTIVITIES: CPT | Mod: GP

## 2022-10-12 PROCEDURE — 120N000001 HC R&B MED SURG/OB

## 2022-10-12 PROCEDURE — 80069 RENAL FUNCTION PANEL: CPT | Performed by: INTERNAL MEDICINE

## 2022-10-12 PROCEDURE — 250N000011 HC RX IP 250 OP 636: Performed by: INTERNAL MEDICINE

## 2022-10-12 PROCEDURE — 99233 SBSQ HOSP IP/OBS HIGH 50: CPT | Performed by: INTERNAL MEDICINE

## 2022-10-12 PROCEDURE — 999N000128 HC STATISTIC PERIPHERAL IV START W/O US GUIDANCE

## 2022-10-12 PROCEDURE — 250N000009 HC RX 250: Performed by: INTERNAL MEDICINE

## 2022-10-12 PROCEDURE — 36415 COLL VENOUS BLD VENIPUNCTURE: CPT | Performed by: INTERNAL MEDICINE

## 2022-10-12 PROCEDURE — 92526 ORAL FUNCTION THERAPY: CPT | Mod: GN | Performed by: SPEECH-LANGUAGE PATHOLOGIST

## 2022-10-12 PROCEDURE — 85025 COMPLETE CBC W/AUTO DIFF WBC: CPT | Performed by: INTERNAL MEDICINE

## 2022-10-12 PROCEDURE — 250N000013 HC RX MED GY IP 250 OP 250 PS 637: Performed by: INTERNAL MEDICINE

## 2022-10-12 PROCEDURE — 83605 ASSAY OF LACTIC ACID: CPT | Performed by: INTERNAL MEDICINE

## 2022-10-12 RX ORDER — METOPROLOL TARTRATE 25 MG/1
25 TABLET, FILM COATED ORAL 2 TIMES DAILY
Status: DISCONTINUED | OUTPATIENT
Start: 2022-10-12 | End: 2022-10-14

## 2022-10-12 RX ORDER — ATORVASTATIN CALCIUM 10 MG/1
10 TABLET, FILM COATED ORAL DAILY
Status: DISCONTINUED | OUTPATIENT
Start: 2022-10-12 | End: 2022-10-14

## 2022-10-12 RX ORDER — METOPROLOL TARTRATE 1 MG/ML
2.5 INJECTION, SOLUTION INTRAVENOUS EVERY 4 HOURS PRN
Status: DISCONTINUED | OUTPATIENT
Start: 2022-10-12 | End: 2022-10-17

## 2022-10-12 RX ORDER — METOPROLOL TARTRATE 1 MG/ML
5 INJECTION, SOLUTION INTRAVENOUS ONCE
Status: COMPLETED | OUTPATIENT
Start: 2022-10-12 | End: 2022-10-12

## 2022-10-12 RX ORDER — METOPROLOL TARTRATE 1 MG/ML
2.5 INJECTION, SOLUTION INTRAVENOUS EVERY 4 HOURS PRN
Status: DISCONTINUED | OUTPATIENT
Start: 2022-10-12 | End: 2022-10-17 | Stop reason: HOSPADM

## 2022-10-12 RX ADMIN — MEROPENEM 1 G: 1 INJECTION, POWDER, FOR SOLUTION INTRAVENOUS at 01:20

## 2022-10-12 RX ADMIN — DOXYCYCLINE HYCLATE 100 MG: 100 CAPSULE ORAL at 18:08

## 2022-10-12 RX ADMIN — UMECLIDINIUM 1 PUFF: 62.5 AEROSOL, POWDER ORAL at 08:14

## 2022-10-12 RX ADMIN — FAMOTIDINE 20 MG: 10 INJECTION INTRAVENOUS at 08:14

## 2022-10-12 RX ADMIN — POTASSIUM & SODIUM PHOSPHATES POWDER PACK 280-160-250 MG 1 PACKET: 280-160-250 PACK at 15:54

## 2022-10-12 RX ADMIN — MEROPENEM 1 G: 1 INJECTION, POWDER, FOR SOLUTION INTRAVENOUS at 09:33

## 2022-10-12 RX ADMIN — METOPROLOL TARTRATE 25 MG: 25 TABLET, FILM COATED ORAL at 09:33

## 2022-10-12 RX ADMIN — ALFUZOSIN HYDROCHLORIDE 10 MG: 10 TABLET, EXTENDED RELEASE ORAL at 08:15

## 2022-10-12 RX ADMIN — DOXYCYCLINE HYCLATE 100 MG: 100 CAPSULE ORAL at 06:03

## 2022-10-12 RX ADMIN — METOPROLOL TARTRATE 2.5 MG: 5 INJECTION INTRAVENOUS at 15:54

## 2022-10-12 RX ADMIN — METOPROLOL TARTRATE 5 MG: 5 INJECTION INTRAVENOUS at 10:15

## 2022-10-12 RX ADMIN — POTASSIUM & SODIUM PHOSPHATES POWDER PACK 280-160-250 MG 1 PACKET: 280-160-250 PACK at 09:11

## 2022-10-12 RX ADMIN — POTASSIUM & SODIUM PHOSPHATES POWDER PACK 280-160-250 MG 1 PACKET: 280-160-250 PACK at 11:39

## 2022-10-12 RX ADMIN — FLUTICASONE FUROATE AND VILANTEROL TRIFENATATE 1 PUFF: 200; 25 POWDER RESPIRATORY (INHALATION) at 08:14

## 2022-10-12 RX ADMIN — ATORVASTATIN CALCIUM 10 MG: 10 TABLET, FILM COATED ORAL at 09:33

## 2022-10-12 RX ADMIN — METOPROLOL TARTRATE 25 MG: 25 TABLET, FILM COATED ORAL at 21:20

## 2022-10-12 RX ADMIN — MEROPENEM 1 G: 1 INJECTION, POWDER, FOR SOLUTION INTRAVENOUS at 18:08

## 2022-10-12 ASSESSMENT — ACTIVITIES OF DAILY LIVING (ADL)
ADLS_ACUITY_SCORE: 43

## 2022-10-12 NOTE — PROVIDER NOTIFICATION
MD Notification    Notified Person: MD      Notified Person Name: Camron Joe     Notification Date/Time: 10/12 0435     Notification Interaction:  Paged     Purpose of notification: Pt been SR/ST with 2nd degree AV block.... 12 lead done and pt in Atrial flutter w/ variable AV block & right bundle branch      Orders Received: PRN metoprolol     Comments:

## 2022-10-12 NOTE — PROGRESS NOTES
[facility]  Gastroenterology Progress Note     Hermilo Velasquez MRN# [id number]   YOB: 1932 Age: 89 year old          Assessment and Plan:   Hermilo Gtz is a 89 year old male with significant past medical history admitted on 10/9 with an episode of vomiting, dypshagia, found to have pneumonia and probable cystis.    Pneumonia  Dysphagia  Vomiting  There is concern for aspiration pneumonia and speech has evaluated patient and swallow study noted mile oral pharyngeal dysphagia and emesis during study possible increased esophageal dysphagia as source. Recommend GI consult for emesis.  He underwent EGD on 8/23/22 and noted to be completely normal  Source of dysphagia and emesis likely due to confusion, age, weakness with underlying infectious process  There is no benefit to repeating EGD as done 2 months ago and was wnl.    -- recommend treating conservatively with antiemetics  -- Diet per speech                Interval History:   doing well; no cp, sob, n/v/d, or abd pain.              Review of Systems:   C: NEGATIVE for fever, chills, change in weight  E/M: NEGATIVE for ear, mouth and throat problems  R: NEGATIVE for significant cough or SOB  CV: NEGATIVE for chest pain, palpitations or peripheral edema             Medications:   I have reviewed this patient's current medications    alfuzosin ER  10 mg Oral Daily     atorvastatin  10 mg Oral Daily     doxycycline hyclate  100 mg Oral Q12H     famotidine  20 mg Intravenous Daily     fluticasone-vilanterol  1 puff Inhalation Daily     meropenem  1 g Intravenous Q8H     metoprolol  5 mg Intravenous Once     metoprolol tartrate  25 mg Oral BID     potassium & sodium phosphates  1 packet Oral or Feeding Tube Q4H     sodium chloride (PF)  3 mL Intracatheter Q8H     umeclidinium  1 puff Inhalation Daily                  Physical Exam:   Vitals were reviewed  Vital Signs with Ranges  Temp:  [97.7  F (36.5  C)-98.9  F (37.2  C)]  97.7  F (36.5  C)  Pulse:  [] 110  Resp:  [15-34] 31  BP: (103-146)/(56-87) 139/81  SpO2:  [88 %-98 %] 95 %  I/O last 3 completed shifts:  In: 600 [P.O.:600]  Out: 950 [Urine:950]  Constitutional: healthy, alert and no distress   Cardiovascular: negative, PMI normal. No lifts, heaves, or thrills. RRR. No murmurs, clicks gallops or rub  Respiratory: negative, Percussion normal. Good diaphragmatic excursion. Lungs clear  Abdomen: Abdomen soft, non-tender. BS normal. No masses, organomegaly             Data:   I reviewed the patient's new clinical lab test results.   Recent Labs   Lab Test 10/12/22  0521 10/11/22  0518 10/10/22  0700 10/09/22  1353 08/24/22  0411 08/23/22  1712 05/08/22  2336 05/08/22  1223   WBC 9.6 14.1* 22.1* 25.3*   < > 8.5   < > 10.7   HGB 7.9* 7.5* 8.7* 8.5*   < > 5.3*   < > 11.2*   MCV 96 95 99 97   < > 97   < > 88    146* 165 194   < > 225   < > 244   INR  --   --   --  1.19*  --  1.65*  --  1.99*    < > = values in this interval not displayed.     Recent Labs   Lab Test 10/12/22  0521 10/11/22  0518 10/10/22  0700   POTASSIUM 3.7 3.8 4.1   CHLORIDE 114* 112* 111*   CO2 26 25 25   BUN 18 25 29   ANIONGAP 4 4 3     Recent Labs   Lab Test 10/12/22  0521 10/11/22  0518 10/10/22  0700 10/09/22  1554 08/31/22  0829 08/27/22  2245 08/23/22  1712 08/23/22  1322 05/08/22  1501 03/22/20  1950 03/22/20  1914   ALBUMIN 2.2* 2.1* 2.3*  --  2.1* 2.3*   < >  --   --    < > 2.6*   BILITOTAL  --   --  1.0  --  0.4 0.6   < >  --   --    < > 0.6   ALT  --   --  12  --  11 12   < >  --   --    < > 27   AST  --   --  24  --  10 15   < >  --   --    < > 101*   PROTEIN  --   --   --  20*  --   --   --  Negative 50*   < >  --    LIPASE  --   --   --   --   --   --   --   --   --   --  54*    < > = values in this interval not displayed.       I reviewed the patient's new imaging results.    All laboratory data reviewed  All imaging studies reviewed by me.    Sammie Cox PA-C,  10/12/2022  [facility]  Office : [phone]  Cell: [phone] (Dr. Florentino)  Cell: [phone] (Sammie Cox PA-C)

## 2022-10-12 NOTE — PLAN OF CARE
"Oriented to self and time. Sitter at bedside @ 1600 d/t pt attempting to pull lines and stand up w/o calling. VSS on 1L NC, occasionally hypertensive. IV metoprolol given x 1. Lung sounds diminished w/ inspiratory wheezes, tele varies from Aflutter, SR, ST, hx: chronic afib. BS normoactive, BM this shift. GI consult decided no EGD follow up after emesis episode during swallow study yesterday d/t WDL EGD done 2 mos ago. Voiding adequately w/ rodriguez. Tolerating easy to chew/thin liq diet. Pt has difficulty swallowing whole pills, attempts to chew. Consider crushing pills in applesauce/pudding. Denies pain and nausea. Up w/ assist x 1+GB&W.   Phos this AM 2.3, protocol initiated, recheck tomorrow AM.     1900 sitter reported pt attempting to get out of bed, threatening violence. Writer came to bedside at pt was calm, confused, slumped all the way down in bed, still oriented to self and time. Said he \"wanted to get out of bed to make coffee\". Pt re-oriented, continue to monitor.  "

## 2022-10-12 NOTE — CONSULTS
Care Management Initial Consult    General Information  Assessment completed with: Patient, Spouse or significant other,    Type of CM/SW Visit: Initial Assessment    Primary Care Provider verified and updated as needed:     Readmission within the last 30 days:        Reason for Consult: discharge planning  Advance Care Planning: Advance Care Planning Reviewed: no concerns identified          Communication Assessment  Patient's communication style: spoken language (English or Bilingual)             Cognitive  Cognitive/Neuro/Behavioral: (P) .WDL except, mood/behavior, orientation  Level of Consciousness: (P) confused, alert  Arousal Level: (P) opens eyes spontaneously  Orientation: (P) disoriented to, place, situation  Mood/Behavior: (P) cooperative  Best Language: (P) 0 - No aphasia  Speech: (P) garbled, rambling, spontaneous    Living Environment:   People in home: spouse     Current living Arrangements: house      Able to return to prior arrangements: yes       Family/Social Support:  Care provided by: self, spouse/significant other  Provides care for:    Marital Status:   Wife          Description of Support System: Involved, Supportive    Support Assessment: Adequate social supports, Adequate family and caregiver support    Current Resources:   Patient receiving home care services: Yes  Skilled Home Care Services: Physicial Therapy, Occupational Therapy  Community Resources: Home Care  Equipment currently used at home: walker, rolling  Supplies currently used at home: Oxygen Tubing/Supplies, Nebulizer tubing    Employment/Financial:  Employment Status: retired        Financial Concerns:             Lifestyle & Psychosocial Needs:  Social Determinants of Health     Tobacco Use: Medium Risk     Smoking Tobacco Use: Former     Smokeless Tobacco Use: Never     Passive Exposure: Not on file   Alcohol Use: Not on file   Financial Resource Strain: Not on file   Food Insecurity: Not on file   Transportation  Needs: Not on file   Physical Activity: Not on file   Stress: Not on file   Social Connections: Not on file   Intimate Partner Violence: Not on file   Depression: Not at risk     PHQ-2 Score: 0   Housing Stability: Not on file       Functional Status:  Prior to admission patient needed assistance:    Patient lives at home with his wife and was participating in home health care.           Mental Health Status:          Chemical Dependency Status:                Values/Beliefs:  Spiritual, Cultural Beliefs, Latter day Practices, Values that affect care:                 Additional Information:  SW reviewed chart. Patient admitted to the hospital on 10/9/22 due to decreased responsiveness. Patient found to have sepsis due to worsening right sided pneumonia and probable acute cystitis. SW met with patient and wife at bedside to introduce self and role. SW also discussed therapy recommendation for TCU vs home with home care. Patient's wife stated that patient was just at [facility] and she does not feel he needs to go to a TCU at this time. She feels he was doing well at home with home care and would rather bring him home when he is ready to discharge from the hospital. Patient was receiving home care through [facility] and patient and wife would like to restart these services upon discharge. CM/SW to coordinate these services. CM/SW will continue to follow.     SY Cobian

## 2022-10-12 NOTE — PROVIDER NOTIFICATION
MD Notification    Notified Person: MD    Notified Person Name: Aniket Bowden    Notification Date/Time: 10/12/22 0728    Notification Interaction: Chromatin Messaging    Purpose of Notification: Pt's AM phos 2.3, no protocols ordered    Orders Received: MD will place order for phos replacement protocol    Comments:

## 2022-10-12 NOTE — PROGRESS NOTES
[facility]    Hospitalist Progress Note    Brief Summary:  Hermilo Velasquez is a 89 year old male with chronic atrial fibrillation, history of PE, CKD 3, cognitive impairment, dysphagia, colonic AVMs, s/p TAVR for aortic stenosis who presents on 10/9/2022 due to decreased responsiveness.      He was recently hospitalized from 8/23 -9/8 due to acute GI bleed with hemoglobin of 5.8, thought to be due to colonic AVMs.  He was transfused 2 units of PRBCs, EGD did not show any bleeding and colonoscopy was deferred due to delirium.  Anticoagulation was discontinued.  While hospitalized he developed right-sided pneumonia thought to be due to aspiration.  Speech pathology was consulted but patient did not tolerate video swallow study and did not tolerate modified diet.  Palliative care was consulted for goals of care discussion and family decided to continue aggressive cares and full CODE STATUS.     He had episode of emesis 1 day PTA,  but overall did well.  He also had decreased urine output yesterday,  Presented to ED with decrease responsiveness,   Noted to have urinary retention and Suazo catheter was placed.  Further work-up showed sepsis-due to worsening right-sided pneumonia and probable acute cystitis.    Assessment & Plan         #.  Severe sepsis-present on admission-source right sided pneumonia versus aspiration pneumonia and   UTI  #.  Acute Metabolic encephalopathy-secondary to sepsis: resolved.     -With low blood pressure of systolic 80s, leukocytosis of 25, acute metabolic encephalopathy,  ARF patient meets criteria for severe sepsis on admission, patient is now awake, alert and oriented and breathing comfortably    -He responded very well to IV fluids-blood pressure now improve with that, renal failure resolved, eating orally now, stop IV fluids   -Lactate is normal.  CRP and procalcitonin elevated on admission, will repeat Pro calcitonin tomorrow   -Chest x-ray shows worsening  right-sided infiltrate.  Urine is mildly abnormal as well concerning for UTI  -Started on IV cefepime and doxycycline after blood cultures were obtained in ED  - Stop IV Cefepime as allergic to PCN start on IV Meropenem to cover for aspiration as well.   IS, flutter.   -Continue IV antibiotics, Blood culture negative so far.   Overall improve, intermittent hypoxia, I think is because of the faulty probe and positional, as he is 100% and all of a sudden saturation drop to 87% and come back to 97%, recommend changing the probe or use the ear probe and wean him off the oxygen.      #.  Aspiration pneumonia versus worsening right-sided pneumonia  #.  Dysphagia  -Was treated with IV cefepime and doxycycline during recent hospitalization due to hospital-acquired pneumonia with concern for aspiration, restarted on same medication, give possibility of aspiration will  Change cefepime to Meropenem on 10/10  -X-ray now shows worsening infiltrate in right lung  - did have episode of emesis day before admission-has known dysphagia   Consult speech for evaluation, much  More awake and alert now and wanted to eat,  Patient is started on oral diet on 10/10 and had Video swallow evaluation on 10/11,  Did well, on easy to chew and thin liquid diet.   Noticed vomiting during the procedure, will keep him on antiemetic protocol, recent  EGD was negative.   -overall improve now.       #.  UTI with hematuria  #.  Acute urinary retention  -Rodriguez catheter placed in ER with return of 800 mL of urine.  Initially there were blood and blood clots but these have not cleared  -His UA is abnormal with mild pyuria  -IV antibiotics as above  -Continue Rodriguez catheter  -start him on Proscar and Flomax , keep rodriguez catheter in for now, will give him voiding trial tomorrow   Urine culture no growth      #.  Recent acute GI bleed suspected to be due to colonic AVMs-August 2022  Chronic Anemia   -Hemoglobin stable and at baseline, hemoglobin 8.5,  slightly drop secondary to IV fluids  And stable now.   -Anticoagulation was discontinued at that time     #.  Cognitive impairment  -Goals of care were discussed during previous admission.  Family chose to keep him full code and all aggressive treatments  Stable at this time.      #.  Chronic atrial fibrillation  #.  History of pulmonary embolism  -Hold Toprol-XL due to hypotension on admission, now Afib with RVR, resume   Metoprolol now and give IV metoprolol 5 mg X 1 dose.   -Anticoagulation discontinued during previous admission due to GI bleed     #.  S/p TAVR for aortic stenosis  TTE (8/22): EF 50-55%, unable to evaluate WMA due to limited study; mean AV gradient 14 mmHg  Stable       Overall stable now.   IS flutter and antiemetic protocol  Try to wean him off the oxygen, use ear probe for pulse oxy   Ambulate the patient.   Resume his Metoprolol now         DVT Prophylaxis: Pneumatic Compression Devices  Code Status: Full Code    Disposition: Expected discharge in 1-2 days once stable    Aniket Bowden MD, MD  Text Page  (7am - 6pm)    Interval History   Overnight was tachycardic and this morning as well, no fever, chills, chest pain, SOB, nausea or vomiting  At this time.     No other significant event overnight.     -Data reviewed today: I reviewed all new labs and imaging results over the last 24 hours. I personally reviewed no images or EKG's today.    Physical Exam   Temp: 98.6  F (37  C) Temp src: Axillary BP: 112/74 Pulse: 91   Resp: 18 SpO2: 90 % O2 Device: Nasal cannula Oxygen Delivery: 4 LPM  Vitals:    10/09/22 1338 10/11/22 0523   Weight: 70.8 kg (156 lb 1.4 oz) 76.7 kg (169 lb 1.5 oz)     Vital Signs with Ranges  Temp:  [97.7  F (36.5  C)-98.9  F (37.2  C)] 98.6  F (37  C)  Pulse:  [] 91  Resp:  [17-34] 18  BP: (103-139)/() 112/74  SpO2:  [87 %-98 %] 90 %  I/O last 3 completed shifts:  In: 600 [P.O.:600]  Out: 950 [Urine:950]    Constitutional: awake, alert, cooperative, no apparent  distress, and appears stated age  Eyes: Lids and lashes normal, pupils equal, round and reactive to light, extra ocular muscles intact, sclera clear, conjunctiva normal  Respiratory: No increased work of breathing, right sided crackles and wheezing, left side clear  Cardiovascular: Normal apical impulse, regular rate and rhythm, normal S1 and S2, no S3 or S4, and no murmur noted  GI: No scars, normal bowel sounds, soft, non-distended, non-tender, no masses palpated, no hepatosplenomegally  Musculoskeletal: There is no redness, warmth, or swelling of the joints.  Full range of motion noted.  Motor strength is 5 out of 5 all extremities bilaterally.  Tone is normal.  Neurologic: Awake, alert, no focal deficit.     Medications       alfuzosin ER  10 mg Oral Daily     atorvastatin  10 mg Oral Daily     doxycycline hyclate  100 mg Oral Q12H     famotidine  20 mg Intravenous Daily     fluticasone-vilanterol  1 puff Inhalation Daily     meropenem  1 g Intravenous Q8H     metoprolol tartrate  25 mg Oral BID     potassium & sodium phosphates  1 packet Oral or Feeding Tube Q4H     sodium chloride (PF)  3 mL Intracatheter Q8H     umeclidinium  1 puff Inhalation Daily       Data   Recent Labs   Lab 10/12/22  0521 10/11/22  0518 10/10/22  0700 10/09/22  1353 10/09/22  1353   WBC 9.6 14.1* 22.1*  --  25.3*   HGB 7.9* 7.5* 8.7*  --  8.5*   MCV 96 95 99  --  97    146* 165  --  194   INR  --   --   --   --  1.19*    141 139  --  143   POTASSIUM 3.7 3.8 4.1  --  4.0   CHLORIDE 114* 112* 111*  --  114*   CO2 26 25 25  --  27   BUN 18 25 29  --  23   CR 0.78 1.06 1.42*  --  1.57*   ANIONGAP 4 4 3  --  2*   AMRITA 8.4* 8.0* 7.7*  --  7.6*   * 101* 113*  --  118*   ALBUMIN 2.2* 2.1* 2.3*   < >  --    PROTTOTAL  --   --  5.6*  --   --    BILITOTAL  --   --  1.0  --   --    ALKPHOS  --   --  66  --   --    ALT  --   --  12  --   --    AST  --   --  24  --   --     < > = values in this interval not displayed.       No  results found for this or any previous visit (from the past 24 hour(s)).

## 2022-10-12 NOTE — PLAN OF CARE
Hospitalist Progress Note      PCP: DESMOND Gordon - CNP    Date of Admission: 7/16/2021    Chief Complaint: spells       Subjective:  No further spells. He feels fine. [place] to discharge. Medications:  Reviewed    Infusion Medications    sodium chloride       Scheduled Medications    buprenorphine-naloxone  1 Film Sublingual Daily    levETIRAcetam  750 mg Oral BID    sodium chloride flush  5-40 mL Intravenous 2 times per day    enoxaparin  40 mg Subcutaneous Daily    nicotine  1 patch Transdermal Once     PRN Meds: LORazepam, sodium chloride flush, sodium chloride, ondansetron **OR** ondansetron, polyethylene glycol, acetaminophen **OR** acetaminophen      Intake/Output Summary (Last 24 hours) at 7/17/2021 0724  Last data filed at 7/16/2021 2314  Gross per 24 hour   Intake 1000 ml   Output --   Net 1000 ml       Physical Exam Performed:    /64   Pulse 79   Temp 97.4 °F (36.3 °C) (Oral)   Resp 16   Ht 6' (1.829 m)   Wt 166 lb 3.2 oz (75.4 kg)   SpO2 98%   BMI 22.54 kg/m²     General appearance: No apparent distress, appears stated age and cooperative. HEENT: Pupils equal, round, and reactive to light. Conjunctivae/corneas clear. Neck: Supple, with full range of motion. No jugular venous distention. Trachea midline. Respiratory:  Normal respiratory effort. Clear to auscultation, bilaterally without Rales/Wheezes/Rhonchi. Cardiovascular: Regular rate and rhythm with normal S1/S2 without murmurs, rubs or gallops. Abdomen: Soft, non-tender, non-distended with normal bowel sounds. Musculoskeletal: No clubbing, cyanosis or edema bilaterally. Full range of motion without deformity. Skin: Skin color, texture, turgor normal.  No rashes or lesions. Neurologic:  Neurovascularly intact without any focal sensory/motor deficits.  Cranial nerves: II-XII intact, grossly non-focal.  Psychiatric: Alert and oriented, thought content appropriate, normal insight  Capillary Refill: Pt A&O to self and time. Repeating and confused. Tachy in 110's, on 3-4L NC overnight. Tele ST w/ 2nd degree AV block, repeat EKG completed at 0230, Atrial flutter w/ BBB - on call MD notified, PRN metoprolol added. PIV on L, assist of 2 GB/walker. On an easy to chew diet, no straws. Adequate UO via rodriguez. Denies pain. Pt self removed IV site. Swallow study in AM.      Brisk,3 seconds, normal   Peripheral Pulses: +2 palpable, equal bilaterally       Labs:   Recent Labs     07/16/21 2009   WBC 7.2   HGB 15.9   HCT 46.9        Recent Labs     07/16/21 2009 07/17/21  0502   * 138   K 4.9 4.2   CL 95* 104   CO2 27 26   BUN 15 15   CREATININE 0.9 0.9   CALCIUM 9.6 8.9     Recent Labs     07/16/21 2009   AST 66*   ALT 87*   BILITOT 1.3*   ALKPHOS 63     No results for input(s): INR in the last 72 hours. Recent Labs     07/16/21 2009   TROPONINI <0.01       Urinalysis:      Lab Results   Component Value Date    NITRU Negative 07/16/2021    WBCUA 3-5 01/09/2018    BACTERIA Rare 01/09/2018    RBCUA 0-2 01/09/2018    BLOODU Negative 07/16/2021    SPECGRAV <=1.005 07/16/2021    GLUCOSEU Negative 07/16/2021       Radiology:  XR CHEST PORTABLE   Final Result   No acute cardiopulmonary disease. Assessment/Plan:    Active Hospital Problems    Diagnosis     Breakthrough seizure ([facility] [address] [G40.919]     Chronic hepatitis C without hepatic coma ([facility] [address] [B18.2]     Heroin abuse ([facility] [address] [F11.10]        The patient is a pleasant [id number] [place] [address] M with a long history of substance abuse. He has been incarcerated for much of his adult life, but recently cleaned up his act, moved back in with his family, and has been taking suboxone. He still smokes marijuana but abstains from other street drugs. He has been having self-limited spells of unconsciousness fairly frequently lately. It usually happens in the morning when he stands up out of bed. He suddenly feels lightheaded and weak all over. The next thing he knows he is \"waking up on the floor and my mom is freaking out. \"  He says that he always urinates on himself during these spells. He says that they usually last about a minute, and within seconds of waking up he is completely alert and oriented without a postictal phase.   His mom tells him that his arms stick straight out in front of him, he is completely unconscious, and that he has gurgling breathing during these spells. He doesn't seems to \"twist up like a pretzel and shake\" like his friend who has a known seizure disorder. He doesn't really go limp and lifeless, either, though. The patient is stoic and doesn't seem to be embellishing his symptoms. Cardiology recently gave him an outpatient cardiac monitor, and reportedly it has been showing multiple long asystolic periods corresponding to these spells. Supposedly one of these spells was 19 seconds long. Cardiology called him and told him to come to the ED, prompting this admission. He feels pretty normal when he isn't having these spells, and he is eager to go back home. Suspected syncope due to spells of asystole. - electrolytes fine. F/u TSH. F/u TTE.  - will await EP input regarding verification of these cardiac monitor results and whether he needs a pacemaker. - suboxone seems to be a stabilizing factor in his life. Would like to continue this if at all possible. Perhaps he can quit the marijuana. - patient works in construction, regularly uses a chainsaw. He may need significant time off work, and/or driving restrictions, pending cardiology input. Possible seizures. If the cardiac monitor findings are confirmed, then a primary seizure disorder would seem very unlikely. I see that neurology was consulted on admission. He is on keppra. HCV. Consider outpatient testing and treatment. Tobacco smoking, nicotine dependence. Encouraged to quit. Educated about risks. DVT Prophylaxis: enoxaparin  Diet: ADULT DIET; Regular  Code Status: Full Code    PT/OT Eval Status: not indicated    Dispo - when EP is OK with discharge. Unclear at this point. IPTA, no needs.       Abhay Amador MD

## 2022-10-12 NOTE — PLAN OF CARE
[id number]  Orientations: x2  Vitals/Pain: elevated HR (113), no pain, 4L NC at night  Emesis x1, nausea relieved with cold washcloths   Tele: ST with 2nd degree AV block  Lines/Drains: PIV saline locked  GI/: Suazo catheter in place  Labs: Abnormal/Trends: Lactic 1.1  Ambulation/Assist: x2 w/ gait belt and walker

## 2022-10-13 LAB
ANION GAP SERPL CALCULATED.3IONS-SCNC: 3 MMOL/L (ref 3–14)
BASOPHILS # BLD AUTO: 0 10E3/UL (ref 0–0.2)
BASOPHILS NFR BLD AUTO: 0 %
BUN SERPL-MCNC: 16 MG/DL (ref 7–30)
CALCIUM SERPL-MCNC: 8.9 MG/DL (ref 8.5–10.1)
CHLORIDE BLD-SCNC: 111 MMOL/L (ref 94–109)
CO2 SERPL-SCNC: 27 MMOL/L (ref 20–32)
CREAT SERPL-MCNC: 0.74 MG/DL (ref 0.66–1.25)
EOSINOPHIL # BLD AUTO: 1.2 10E3/UL (ref 0–0.7)
EOSINOPHIL NFR BLD AUTO: 16 %
ERYTHROCYTE [DISTWIDTH] IN BLOOD BY AUTOMATED COUNT: 14.8 % (ref 10–15)
GFR SERPL CREATININE-BSD FRML MDRD: 87 ML/MIN/1.73M2
GLUCOSE BLD-MCNC: 95 MG/DL (ref 70–99)
HCT VFR BLD AUTO: 26.5 % (ref 40–53)
HGB BLD-MCNC: 8.2 G/DL (ref 13.3–17.7)
IMM GRANULOCYTES # BLD: 0 10E3/UL
IMM GRANULOCYTES NFR BLD: 0 %
LYMPHOCYTES # BLD AUTO: 0.6 10E3/UL (ref 0.8–5.3)
LYMPHOCYTES NFR BLD AUTO: 9 %
MCH RBC QN AUTO: 28.4 PG (ref 26.5–33)
MCHC RBC AUTO-ENTMCNC: 30.9 G/DL (ref 31.5–36.5)
MCV RBC AUTO: 92 FL (ref 78–100)
MONOCYTES # BLD AUTO: 0.7 10E3/UL (ref 0–1.3)
MONOCYTES NFR BLD AUTO: 10 %
NEUTROPHILS # BLD AUTO: 4.8 10E3/UL (ref 1.6–8.3)
NEUTROPHILS NFR BLD AUTO: 65 %
NRBC # BLD AUTO: 0 10E3/UL
NRBC BLD AUTO-RTO: 0 /100
PHOSPHATE SERPL-MCNC: 2.4 MG/DL (ref 2.5–4.5)
PLATELET # BLD AUTO: 180 10E3/UL (ref 150–450)
POTASSIUM BLD-SCNC: 3.7 MMOL/L (ref 3.4–5.3)
PROCALCITONIN SERPL-MCNC: 3.09 NG/ML
RBC # BLD AUTO: 2.89 10E6/UL (ref 4.4–5.9)
SODIUM SERPL-SCNC: 141 MMOL/L (ref 133–144)
WBC # BLD AUTO: 7.4 10E3/UL (ref 4–11)

## 2022-10-13 PROCEDURE — 99233 SBSQ HOSP IP/OBS HIGH 50: CPT | Performed by: INTERNAL MEDICINE

## 2022-10-13 PROCEDURE — 80048 BASIC METABOLIC PNL TOTAL CA: CPT | Performed by: INTERNAL MEDICINE

## 2022-10-13 PROCEDURE — 250N000011 HC RX IP 250 OP 636: Performed by: INTERNAL MEDICINE

## 2022-10-13 PROCEDURE — 84145 PROCALCITONIN (PCT): CPT | Performed by: INTERNAL MEDICINE

## 2022-10-13 PROCEDURE — 36415 COLL VENOUS BLD VENIPUNCTURE: CPT | Performed by: INTERNAL MEDICINE

## 2022-10-13 PROCEDURE — 85025 COMPLETE CBC W/AUTO DIFF WBC: CPT | Performed by: INTERNAL MEDICINE

## 2022-10-13 PROCEDURE — 84100 ASSAY OF PHOSPHORUS: CPT | Performed by: INTERNAL MEDICINE

## 2022-10-13 PROCEDURE — 120N000001 HC R&B MED SURG/OB

## 2022-10-13 PROCEDURE — 250N000013 HC RX MED GY IP 250 OP 250 PS 637: Performed by: INTERNAL MEDICINE

## 2022-10-13 RX ADMIN — POTASSIUM & SODIUM PHOSPHATES POWDER PACK 280-160-250 MG 1 PACKET: 280-160-250 PACK at 17:16

## 2022-10-13 RX ADMIN — FAMOTIDINE 20 MG: 10 INJECTION INTRAVENOUS at 08:22

## 2022-10-13 RX ADMIN — ALFUZOSIN HYDROCHLORIDE 10 MG: 10 TABLET, EXTENDED RELEASE ORAL at 13:59

## 2022-10-13 RX ADMIN — POTASSIUM & SODIUM PHOSPHATES POWDER PACK 280-160-250 MG 1 PACKET: 280-160-250 PACK at 13:58

## 2022-10-13 RX ADMIN — POTASSIUM & SODIUM PHOSPHATES POWDER PACK 280-160-250 MG 1 PACKET: 280-160-250 PACK at 14:14

## 2022-10-13 RX ADMIN — MEROPENEM 1 G: 1 INJECTION, POWDER, FOR SOLUTION INTRAVENOUS at 10:18

## 2022-10-13 RX ADMIN — MEROPENEM 1 G: 1 INJECTION, POWDER, FOR SOLUTION INTRAVENOUS at 17:16

## 2022-10-13 RX ADMIN — FLUTICASONE FUROATE AND VILANTEROL TRIFENATATE 1 PUFF: 200; 25 POWDER RESPIRATORY (INHALATION) at 14:14

## 2022-10-13 RX ADMIN — ATORVASTATIN CALCIUM 10 MG: 10 TABLET, FILM COATED ORAL at 13:59

## 2022-10-13 RX ADMIN — UMECLIDINIUM 1 PUFF: 62.5 AEROSOL, POWDER ORAL at 14:14

## 2022-10-13 RX ADMIN — MEROPENEM 1 G: 1 INJECTION, POWDER, FOR SOLUTION INTRAVENOUS at 01:14

## 2022-10-13 RX ADMIN — DOXYCYCLINE HYCLATE 100 MG: 100 CAPSULE ORAL at 05:42

## 2022-10-13 RX ADMIN — METOPROLOL TARTRATE 25 MG: 25 TABLET, FILM COATED ORAL at 13:59

## 2022-10-13 RX ADMIN — DOXYCYCLINE HYCLATE 100 MG: 100 CAPSULE ORAL at 17:16

## 2022-10-13 ASSESSMENT — ACTIVITIES OF DAILY LIVING (ADL)
TRANSFERRING: 1-->ASSISTANCE (EQUIPMENT/PERSON) NEEDED
ADLS_ACUITY_SCORE: 43
DRESS: 1-->ASSISTANCE (EQUIPMENT/PERSON) NEEDED (NOT DEVELOPMENTALLY APPROPRIATE)
WEAR_GLASSES_OR_BLIND: YES
ADLS_ACUITY_SCORE: 43
TOILETING: 1-->ASSISTANCE (EQUIPMENT/PERSON) NEEDED (NOT DEVELOPMENTALLY APPROPRIATE)
ADLS_ACUITY_SCORE: 43
TOILETING_ASSISTANCE: TOILETING DIFFICULTY, ASSISTANCE 1 PERSON
DRESS: 1-->ASSISTANCE (EQUIPMENT/PERSON) NEEDED
TOILETING_ISSUES: YES
FALL_HISTORY_WITHIN_LAST_SIX_MONTHS: YES
EQUIPMENT_CURRENTLY_USED_AT_HOME: WALKER, ROLLING
CONCENTRATING,_REMEMBERING_OR_MAKING_DECISIONS_DIFFICULTY: YES
WALKING_OR_CLIMBING_STAIRS: AMBULATION DIFFICULTY, REQUIRES EQUIPMENT
ADLS_ACUITY_SCORE: 43
TRANSFERRING: 1-->ASSISTANCE (EQUIPMENT/PERSON) NEEDED (NOT DEVELOPMENTALLY APPROPRIATE)
TOILETING: 1-->ASSISTANCE (EQUIPMENT/PERSON) NEEDED
ADLS_ACUITY_SCORE: 43
WALKING_OR_CLIMBING_STAIRS_DIFFICULTY: YES
BATHING: 1-->ASSISTANCE NEEDED
DRESSING/BATHING_DIFFICULTY: YES
CHANGE_IN_FUNCTIONAL_STATUS_SINCE_ONSET_OF_CURRENT_ILLNESS/INJURY: YES
ADLS_ACUITY_SCORE: 43
ADLS_ACUITY_SCORE: 43
DRESSING/BATHING: BATHING DIFFICULTY, ASSISTANCE 1 PERSON
DOING_ERRANDS_INDEPENDENTLY_DIFFICULTY: YES
ADLS_ACUITY_SCORE: 43
ADLS_ACUITY_SCORE: 43
DIFFICULTY_EATING/SWALLOWING: NO
ADLS_ACUITY_SCORE: 43

## 2022-10-13 NOTE — CONSULTS
[facility]  Gastroenterology Consultation         Hermilo Velasquez  [address] [facility] [address]  89 year old male    Admission Date/Time: 10/9/2022  Primary Care Provider: Karson Bishop  Referring / Attending Physician: Dr. Bowden    We were asked to see the patient in consultation by Dr. Dr. Bowden for evaluation of recurrent bouts of dysphagia.      CC: Dysphagia    HPI:  Hermilo Velasquez is a 89 year old male who who has been hospitalized multiple times in the recent past with history of chronic atrial fibrillation history of PE stage III chronic kidney disease significant cognitive impairment chronic multiple nonspecific complaint with recurrent complaint of dysphagia history of colonic AVMs s/p TAVR for aortic stenosis who was admitted again with decreasing responsiveness patient has been complaining of recurrent symptoms of dysphagia patient was evaluated in the recent past with upper GI endoscopy along with dilation there was no signs of mechanical obstruction patient does have significant possible esophageal motility problem.  Patient also was admitted due to GI bleed patient was found to have AVMs those were treated patient anticoagulation was discontinued patient's hemoglobin has been fairly stable.  Patient has off-and-on episodes of dyspepsia patient had 1 bout of vomiting prior to admission.  Patient was admitted with possible sepsis and pneumonia patient is slowly improving patient is significantly confused.  Patient has acute metabolic encephalopathy due to sepsis.  Most of the information was obtained from reviewing chart and medical records.    ROS: A comprehensive ten point review of systems was negative aside from those in mentioned in the HPI.      PAST MED HX:  I have reviewed this patient's medical history and updated it with pertinent information if needed.   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 Vasques      Aortic stenosis     Severe AS, 9/2015 AVR - [facility] TRIFECTA Bovine bioprosthesis 25MM [id number]     Atrial fibrillation (H)     9/2015 Paroxysmal post op Afib - discharged on Warfarin and a beta blocker     COPD (chronic obstructive pulmonary disease) (H)      Deep vein thrombosis (H)      GERD (gastroesophageal reflux disease)      Heart murmur      Monoclonal gammopathy     plasmacyte prominent causing monoclonal gammopathy     Need for SBE (subacute bacterial endocarditis) prophylaxis      Neuropathy      Other and unspecified hyperlipidemia      Other malignant lymphomas     non hodgkin's lymphoma     RBBB (right bundle branch block)      Severe sepsis with acute organ dysfunction (H) 11/16/2015     Unspecified hereditary and idiopathic peripheral neuropathy        MEDICATIONS:   Prior to Admission Medications   Prescriptions Last Dose Informant Patient Reported? Taking?   OLANZapine (ZYPREXA) 5 MG tablet Past Week  Yes Yes   Sig: Take 1 tablet (5 mg) by mouth 2 times daily as needed (cognitive impairment)   acetaminophen (TYLENOL) 500 MG tablet Past Week at prn Self Yes Yes   Sig: Take 1,000 mg by mouth 3 times daily as needed   albuterol (PROAIR HFA/PROVENTIL HFA/VENTOLIN HFA) 108 (90 Base) MCG/ACT inhaler Past Week at prn Self Yes Yes   Sig: Inhale 2 puffs into the lungs every 6 hours as needed   albuterol (PROVENTIL) (2.5 MG/3ML) 0.083% neb solution Past Week at prn Self No Yes   Sig: Take 1 vial (2.5 mg) by nebulization every 6 hours as needed for shortness of breath / dyspnea or wheezing   alfuzosin ER (UROXATRAL) 10 MG 24 hr tablet Past Week Self No Yes   Sig: Take 1 tablet (10 mg) by mouth daily   apixaban ANTICOAGULANT (ELIQUIS) 5 MG tablet Not Taking  Yes No   Sig: Take 1 tablet (5 mg) by mouth 2 times daily   Patient not taking: Reported on 10/9/2022   atorvastatin (LIPITOR) 10 MG tablet Not Taking Self No No   Sig: Take 1 tablet (10 mg) by mouth daily  "  Patient not taking: Reported on 10/9/2022   famotidine (PEPCID) 40 MG tablet Past Week Self Yes Yes   Sig: Take 40 mg by mouth 2 times daily   ferrous sulfate (FE TABS) 325 (65 Fe) MG EC tablet Past Week Self Yes Yes   Sig: Take 1 tablet (325 mg) by mouth 2 times daily   fluticasone-vilanterol (BREO ELLIPTA) 200-25 MCG/INH inhaler Past Week  Yes Yes   Sig: Inhale 1 puff into the lungs daily   gabapentin (NEURONTIN) 100 MG capsule Past Week  No Yes   Sig: Take 2 capsules (200 mg) by mouth At Bedtime   metoprolol tartrate (LOPRESSOR) 25 MG tablet Past Week  No Yes   Sig: Take 1 tablet (25 mg) by mouth 2 times daily   tiotropium (SPIRIVA) 18 MCG inhaled capsule Past Week Self Yes Yes   Sig: Inhale 18 mcg into the lungs daily      Facility-Administered Medications: None       ALLERGIES:   Allergies   Allergen Reactions     Omeprazole Itching     Pantoprazole Itching     Prevacid [Lansoprazole] Itching     Lasix [Furosemide] Rash     Lidocaine Blisters and Rash     Allergy to lidocaine ointment  Allergy to lidocaine ointment       Penicillin G Rash     Penicillins Rash     \"broke out from injection\" 60 yrs ago  Tolerates cephalosporins       SOCIAL HISTORY:  Social History     Tobacco Use     Smoking status: Former     Packs/day: 2.00     Years: 55.00     Pack years: 110.00     Types: Cigarettes     Quit date: 1998     Years since quittin.7     Smokeless tobacco: Never   Vaping Use     Vaping Use: Never used   Substance Use Topics     Alcohol use: Not Currently     Drug use: No       FAMILY HISTORY:  Family History   Problem Relation Age of Onset     C.A.D. Father      Emphysema Father      Melanoma No family hx of      Skin Cancer No family hx of        PHYSICAL EXAM:   General awake responding to verbal commands fairly confused  Vital Signs with Ranges  Temp: 97.7  F (36.5  C) Temp src: Oral BP: 134/87 Pulse: 105   Resp: 8 SpO2: 94 % O2 Device: Nasal cannula Oxygen Delivery: 2 LPM  I/O last 3 completed " shifts:  In: 360 [P.O.:360]  Out: 1200 [Urine:1200]    Cardiovascular: negative, PMI normal. No lifts, heaves, or thrills. RRR. No murmurs, clicks gallops or rub  Respiratory: negative, Percussion normal. Good diaphragmatic excursion. Lungs clear  Head: Normocephalic. No masses, lesions, tenderness or abnormalities  Neck: Neck supple. No adenopathy. Thyroid symmetric, normal size,, Carotids without bruits.  Abdomen: Abdomen soft, non-tender. BS normal. No masses, organomegaly  SKIN: no suspicious lesions or rashes          ADDITIONAL COMMENTS:   I reviewed the patient's new clinical lab test results.   Recent Labs   Lab Test 10/12/22  0521 10/11/22  0518 10/10/22  0700 10/09/22  1353 08/24/22  0411 08/23/22  1712 05/08/22  2336 05/08/22  1223   WBC 9.6 14.1* 22.1* 25.3*   < > 8.5   < > 10.7   HGB 7.9* 7.5* 8.7* 8.5*   < > 5.3*   < > 11.2*   MCV 96 95 99 97   < > 97   < > 88    146* 165 194   < > 225   < > 244   INR  --   --   --  1.19*  --  1.65*  --  1.99*    < > = values in this interval not displayed.     Recent Labs   Lab Test 10/12/22  0521 10/11/22  0518 10/10/22  0700   POTASSIUM 3.7 3.8 4.1   CHLORIDE 114* 112* 111*   CO2 26 25 25   BUN 18 25 29   ANIONGAP 4 4 3     Recent Labs   Lab Test 10/12/22  0521 10/11/22  0518 10/10/22  0700 10/09/22  1554 08/31/22  0829 08/27/22  2245 08/23/22  1712 08/23/22  1322 05/08/22  1501 03/22/20  1950 03/22/20  1914   ALBUMIN 2.2* 2.1* 2.3*  --  2.1* 2.3*   < >  --   --    < > 2.6*   BILITOTAL  --   --  1.0  --  0.4 0.6   < >  --   --    < > 0.6   ALT  --   --  12  --  11 12   < >  --   --    < > 27   AST  --   --  24  --  10 15   < >  --   --    < > 101*   PROTEIN  --   --   --  20*  --   --   --  Negative 50*   < >  --    LIPASE  --   --   --   --   --   --   --   --   --   --  54*    < > = values in this interval not displayed.       I reviewed the patient's new imaging results.        CONSULTATION ASSESSMENT AND PLAN:    Principal Problem:    Pneumonia of lower  lobe due to infectious organism, unspecified laterality    Assessment: Very pleasant 89-year-old unfortunate gentleman with history of metabolic encephalopathy sepsis pneumonia with history of GI bleed in the past patient has episodes of dysphagia which is most likely due to esophageal motility patient work-up in the recent past has been negative at this point I will recommend to continue on supportive care most of patient's GI symptoms are due to systemic process.  Continue on proton pump inhibitors and Zofran.  At this point there is no plan for further endoscopic intervention.  GI will continue to follow along.              Vick Florentino MD, FACP  [facility].  Office: [phone]  Cell : [phone]      [facility]  Ph: [phone] Fax: [phone]

## 2022-10-13 NOTE — PLAN OF CARE
Orientations:Alert,disoriented to time,place and situation,calm and cooperative  Vitals/Pain:Vital signs stable,denies pain.On NC at 4 LPM,afebrile  Tele: A flutter  Diet:Easy to chew,thin liquids  Lines/Drains:PIV with Saline lock  Skin/Wounds:Skin color was pale with scattered bruises  GI/: No Bm on this shift,voiding thru rodriguez catheter with adequate urine output  Labs: Abnormal/Trends, Electrolyte Replacement- On K= and Phosphorous Protocol  Ambulation/Assist:Assist of 1 thru GB/Walker  Sleep Quality:Good  Plan:Plan to discharge  to home under home care service in a couple of days

## 2022-10-13 NOTE — PLAN OF CARE
Oriented to self and time. Somnolent, slept most of the day. Pt could not take oral meds until afternoon when alert enough to safely swallow. Taken off IMC @ 0900. VSS on 2-3L NC. Continue to titrate O2. Encourage IS. LS diminished. BS audible, no BM this shift. Suazo removed today @ 0930 per provider for 24hr voiding trial.   Bladder scan @ 1442: 422ml. Straight cath: 250ml   Bladder scan @ 1830: 419ml. Straight cath: 425ml  Pt did not void on his own during shift. Contact provider tomorrow AM @ 0930 if pt unable to void and relying on straight cath.   Denies pain, nausea, and SOB. Tolerating easy to chew w/ thin liq diet. Fair appetite today, encourage fluids. Pt tolerating pills crushed in applesauce w/ sips of water. Assist x 1 + GB&W.   Phos 2.4 @ 0615, protocol initiated, recheck tomorrow AM.  Continue plan of care.

## 2022-10-13 NOTE — PROVIDER NOTIFICATION
Notified provider about indwelling rodriguez catheter discussed removal or continued need.    Did provider choose to remove indwelling rodriguez catheter? Yes    Provider's rodriguez indication for keeping indwelling rodriguez catheter: N/A    Is there an order for indwelling rodriguez catheter? Yes   Per provider: Remove rodriguez catheter for voiding trial, if unsuccessful, will need to re-place rodriguez catheter for 2 weeks.    *If there is a plan to keep rodriguez catheter in place at discharge daily notification with provider is not necessary.

## 2022-10-13 NOTE — PROVIDER NOTIFICATION
MD Notification    Notified Person: MD    Notified Person Name: Aniket Bowden    Notification Date/Time: 10/13/22    Notification Interaction: [employer] Messaging    Purpose of Notification: Request for pt to be taken off IMC orders    Orders Received: Yes, will put in order for IMC orders to be d/c    Comments:

## 2022-10-13 NOTE — PROGRESS NOTES
[facility]    Hospitalist Progress Note    Brief Summary:  Hermilo Velasquez is a 89 year old male with chronic atrial fibrillation, history of PE, CKD 3, cognitive impairment, dysphagia, colonic AVMs, s/p TAVR for aortic stenosis who presents on 10/9/2022 due to decreased responsiveness.      He was recently hospitalized from 8/23 -9/8 due to acute GI bleed with hemoglobin of 5.8, thought to be due to colonic AVMs.  He was transfused 2 units of PRBCs, EGD did not show any bleeding and colonoscopy was deferred due to delirium.  Anticoagulation was discontinued.  While hospitalized he developed right-sided pneumonia thought to be due to aspiration.  Speech pathology was consulted but patient did not tolerate video swallow study and did not tolerate modified diet.  Palliative care was consulted for goals of care discussion and family decided to continue aggressive cares and full CODE STATUS.     He had episode of emesis 1 day PTA,  but overall did well.  He also had decreased urine output yesterday,  Presented to ED with decrease responsiveness,   Noted to have urinary retention and Suazo catheter was placed.  Further work-up showed sepsis-due to worsening right-sided pneumonia and probable acute cystitis.    Assessment & Plan         #.  Severe sepsis-present on admission-source right sided pneumonia versus aspiration pneumonia and   UTI  #.  Acute Metabolic encephalopathy-secondary to sepsis: resolved.     -With low blood pressure of systolic 80s, leukocytosis of 25, acute metabolic encephalopathy,  ARF patient meets criteria for severe sepsis on admission, patient is now awake, alert and oriented and breathing comfortably    -He responded very well to IV fluids-blood pressure now improve with that, renal failure resolved, eating orally now, stop IV fluids   -Lactate is normal.  CRP and procalcitonin elevated on admission, will repeat Pro calcitonin tomorrow   -Chest x-ray shows worsening  right-sided infiltrate.  Urine is mildly abnormal as well concerning for UTI  -Started on IV cefepime and doxycycline after blood cultures were obtained in ED  - Stop IV Cefepime as allergic to PCN start on IV Meropenem to cover for aspiration as well.   IS, flutter.   -Continue IV antibiotics, Blood culture negative so far.   Overall improve, continue to wean his oxygen down, Procalcitonin improve from 7.49 to 3.01.  Encourage IS and flutter      #.  Aspiration pneumonia versus worsening right-sided pneumonia  #.  Dysphagia  -Was treated with IV cefepime and doxycycline during recent hospitalization due to hospital-acquired pneumonia with concern for aspiration, restarted on same medication, give possibility of aspiration will  Change cefepime to Meropenem on 10/10  -X-ray now shows worsening infiltrate in right lung  - did have episode of emesis day before admission-has known dysphagia   Consult speech for evaluation, much  More awake and alert now and wanted to eat,  Patient is started on oral diet on 10/10 and had Video swallow evaluation on 10/11,  Did well, on easy to chew and thin liquid diet.   Noticed vomiting during the procedure, will keep him on antiemetic protocol, recent  EGD was negative.   -overall improve now.       #.  UTI with hematuria  #.  Acute urinary retention  -Rodriguez catheter placed in ER with return of 800 mL of urine.  Initially there were blood and blood clots but these have not cleared  -His UA is abnormal with mild pyuria  -IV antibiotics as above  -Continue Rodriguez catheter  -start him on Proscar and Flomax , will remove rodriguez catheter and give him Voiding trial today.       #.  Recent acute GI bleed suspected to be due to colonic AVMs-August 2022  Chronic Anemia   -Hemoglobin stable and at baseline, hemoglobin 8.5, slightly drop secondary to IV fluids  And stable now.   -Anticoagulation was discontinued previously      #.  Cognitive impairment  -Goals of care were discussed during  previous admission.  Family chose to keep him full code and all aggressive treatments  Stable at this time.      #.  Chronic atrial fibrillation  #.  History of pulmonary embolism  -Hold Toprol-XL due to hypotension on admission, now Afib with RVR, resume   Metoprolol now and give IV metoprolol 5 mg X 1 dose.   -Anticoagulation discontinued during previous admission due to GI bleed  HR better control now.      #.  S/p TAVR for aortic stenosis  TTE (8/22): EF 50-55%, unable to evaluate WMA due to limited study; mean AV gradient 14 mmHg  Stable       Overall improving  Will repeat Xray chest tomorrow.  Procalcitonin improve but still elevated  Continue with IV antibiotics  Remove rodriguez catheter today and give voiding trial, if fail then need  2 weeks of rodriguez catheter  IS, futter and continue to wean his oxygen down.    Discussed with the patient and the nursing staff taking care of the patient today      DVT Prophylaxis: Pneumatic Compression Devices  Code Status: Full Code    Disposition: Expected discharge in 1-2 days once stable    Aniket Bowden MD, MD  Text Page  (7am - 6pm)    Interval History   Patient sleeping comfortably this morning, did woke up and offer no complaints.  O2 wean down to 2 LPM now     No other significant event overnight.     -Data reviewed today: I reviewed all new labs and imaging results over the last 24 hours. I personally reviewed no images or EKG's today.    Physical Exam   Temp: 99.1  F (37.3  C) Temp src: Axillary BP: (!) 142/67 Pulse: 97   Resp: 13 SpO2: 95 % O2 Device: Nasal cannula Oxygen Delivery: 4 LPM  Vitals:    10/09/22 1338 10/11/22 0523 10/13/22 0643   Weight: 70.8 kg (156 lb 1.4 oz) 76.7 kg (169 lb 1.5 oz) 75.4 kg (166 lb 3.6 oz)     Vital Signs with Ranges  Temp:  [97.4  F (36.3  C)-99.1  F (37.3  C)] 99.1  F (37.3  C)  Pulse:  [] 97  Resp:  [8-28] 13  BP: (111-148)/() 142/67  SpO2:  [90 %-97 %] 95 %  I/O last 3 completed shifts:  In: -   Out: 1870  [Urine:1870]    Constitutional: awake, alert, cooperative, no apparent distress, and appears stated age  Eyes: Lids and lashes normal, pupils equal, round and reactive to light, extra ocular muscles intact, sclera clear, conjunctiva normal  Respiratory: No increased work of breathing, right sided crackles occasional no more  wheezing, left side clear  Cardiovascular: Normal apical impulse, regular rate and rhythm, normal S1 and S2, no S3 or S4, and no murmur noted  GI: No scars, normal bowel sounds, soft, non-distended, non-tender, no masses palpated, no hepatosplenomegally  Musculoskeletal: There is no redness, warmth, or swelling of the joints.  Full range of motion noted.  Motor strength is 5 out of 5 all extremities bilaterally.  Tone is normal.  Neurologic: Awake, alert, no focal deficit.     Medications       alfuzosin ER  10 mg Oral Daily     atorvastatin  10 mg Oral Daily     doxycycline hyclate  100 mg Oral Q12H     famotidine  20 mg Intravenous Daily     fluticasone-vilanterol  1 puff Inhalation Daily     meropenem  1 g Intravenous Q8H     metoprolol tartrate  25 mg Oral BID     potassium & sodium phosphates  1 packet Oral or Feeding Tube Q4H     sodium chloride (PF)  3 mL Intracatheter Q8H     umeclidinium  1 puff Inhalation Daily       Data   Recent Labs   Lab 10/13/22  0616 10/13/22  0615 10/12/22  0521 10/11/22  0518 10/10/22  0700 10/09/22  1353 10/09/22  1353   WBC 7.4  --  9.6 14.1* 22.1*  --  25.3*   HGB 8.2*  --  7.9* 7.5* 8.7*  --  8.5*   MCV 92  --  96 95 99  --  97     --  173 146* 165  --  194   INR  --   --   --   --   --   --  1.19*   NA  --  141 144 141 139  --  143   POTASSIUM  --  3.7 3.7 3.8 4.1  --  4.0   CHLORIDE  --  111* 114* 112* 111*  --  114*   CO2  --  27 26 25 25  --  27   BUN  --  16 18 25 29  --  23   CR  --  0.74 0.78 1.06 1.42*  --  1.57*   ANIONGAP  --  3 4 4 3  --  2*   AMRITA  --  8.9 8.4* 8.0* 7.7*  --  7.6*   GLC  --  95 102* 101* 113*  --  118*   ALBUMIN  --   --   2.2* 2.1* 2.3*   < >  --    PROTTOTAL  --   --   --   --  5.6*  --   --    BILITOTAL  --   --   --   --  1.0  --   --    ALKPHOS  --   --   --   --  66  --   --    ALT  --   --   --   --  12  --   --    AST  --   --   --   --  24  --   --     < > = values in this interval not displayed.       No results found for this or any previous visit (from the past 24 hour(s)).

## 2022-10-14 ENCOUNTER — APPOINTMENT (OUTPATIENT)
Dept: GENERAL RADIOLOGY | Facility: CLINIC | Age: 87
DRG: 871 | End: 2022-10-14
Attending: INTERNAL MEDICINE
Payer: COMMERCIAL

## 2022-10-14 ENCOUNTER — APPOINTMENT (OUTPATIENT)
Dept: SPEECH THERAPY | Facility: CLINIC | Age: 87
DRG: 871 | End: 2022-10-14
Payer: COMMERCIAL

## 2022-10-14 ENCOUNTER — APPOINTMENT (OUTPATIENT)
Dept: PHYSICAL THERAPY | Facility: CLINIC | Age: 87
DRG: 871 | End: 2022-10-14
Payer: COMMERCIAL

## 2022-10-14 LAB
ANION GAP SERPL CALCULATED.3IONS-SCNC: 6 MMOL/L (ref 3–14)
ATRIAL RATE - MUSE: 119 BPM
BACTERIA BLD CULT: NO GROWTH
BACTERIA BLD CULT: NO GROWTH
BASE EXCESS BLDA CALC-SCNC: 3.9 MMOL/L (ref -9–1.8)
BUN SERPL-MCNC: 14 MG/DL (ref 7–30)
CALCIUM SERPL-MCNC: 8.9 MG/DL (ref 8.5–10.1)
CHLORIDE BLD-SCNC: 108 MMOL/L (ref 94–109)
CO2 SERPL-SCNC: 27 MMOL/L (ref 20–32)
CREAT SERPL-MCNC: 0.75 MG/DL (ref 0.66–1.25)
DIASTOLIC BLOOD PRESSURE - MUSE: NORMAL MMHG
GFR SERPL CREATININE-BSD FRML MDRD: 86 ML/MIN/1.73M2
GLUCOSE BLD-MCNC: 87 MG/DL (ref 70–99)
HCO3 BLD-SCNC: 29 MMOL/L (ref 21–28)
INTERPRETATION ECG - MUSE: NORMAL
MAGNESIUM SERPL-MCNC: 1.8 MG/DL (ref 1.6–2.3)
NT-PROBNP SERPL-MCNC: ABNORMAL PG/ML (ref 0–1800)
O2/TOTAL GAS SETTING VFR VENT: 21 %
P AXIS - MUSE: 67 DEGREES
PCO2 BLD: 44 MM HG (ref 35–45)
PH BLD: 7.43 [PH] (ref 7.35–7.45)
PHOSPHATE SERPL-MCNC: 2.1 MG/DL (ref 2.5–4.5)
PO2 BLD: 54 MM HG (ref 80–105)
POTASSIUM BLD-SCNC: 3.6 MMOL/L (ref 3.4–5.3)
POTASSIUM BLD-SCNC: 3.6 MMOL/L (ref 3.4–5.3)
PR INTERVAL - MUSE: NORMAL MS
QRS DURATION - MUSE: 146 MS
QT - MUSE: 366 MS
QTC - MUSE: 490 MS
R AXIS - MUSE: -38 DEGREES
SODIUM SERPL-SCNC: 141 MMOL/L (ref 133–144)
SYSTOLIC BLOOD PRESSURE - MUSE: NORMAL MMHG
T AXIS - MUSE: 54 DEGREES
VENTRICULAR RATE- MUSE: 108 BPM

## 2022-10-14 PROCEDURE — 92526 ORAL FUNCTION THERAPY: CPT | Mod: GN

## 2022-10-14 PROCEDURE — 250N000013 HC RX MED GY IP 250 OP 250 PS 637

## 2022-10-14 PROCEDURE — 82803 BLOOD GASES ANY COMBINATION: CPT | Performed by: PHYSICIAN ASSISTANT

## 2022-10-14 PROCEDURE — 250N000009 HC RX 250: Performed by: INTERNAL MEDICINE

## 2022-10-14 PROCEDURE — 97530 THERAPEUTIC ACTIVITIES: CPT | Mod: GP

## 2022-10-14 PROCEDURE — 84132 ASSAY OF SERUM POTASSIUM: CPT | Performed by: INTERNAL MEDICINE

## 2022-10-14 PROCEDURE — 250N000009 HC RX 250

## 2022-10-14 PROCEDURE — 250N000011 HC RX IP 250 OP 636: Performed by: INTERNAL MEDICINE

## 2022-10-14 PROCEDURE — 84100 ASSAY OF PHOSPHORUS: CPT | Performed by: INTERNAL MEDICINE

## 2022-10-14 PROCEDURE — 250N000013 HC RX MED GY IP 250 OP 250 PS 637: Performed by: INTERNAL MEDICINE

## 2022-10-14 PROCEDURE — 36569 INSJ PICC 5 YR+ W/O IMAGING: CPT

## 2022-10-14 PROCEDURE — 258N000003 HC RX IP 258 OP 636: Performed by: INTERNAL MEDICINE

## 2022-10-14 PROCEDURE — 36415 COLL VENOUS BLD VENIPUNCTURE: CPT | Performed by: INTERNAL MEDICINE

## 2022-10-14 PROCEDURE — 71045 X-RAY EXAM CHEST 1 VIEW: CPT

## 2022-10-14 PROCEDURE — 83735 ASSAY OF MAGNESIUM: CPT

## 2022-10-14 PROCEDURE — 99233 SBSQ HOSP IP/OBS HIGH 50: CPT | Performed by: INTERNAL MEDICINE

## 2022-10-14 PROCEDURE — 97116 GAIT TRAINING THERAPY: CPT | Mod: GP

## 2022-10-14 PROCEDURE — 80048 BASIC METABOLIC PNL TOTAL CA: CPT | Performed by: INTERNAL MEDICINE

## 2022-10-14 PROCEDURE — 272N000433 HC KIT CATH IV 18 OR 20G CM, POWERGLIDE W MAX BARRIER

## 2022-10-14 PROCEDURE — 120N000001 HC R&B MED SURG/OB

## 2022-10-14 PROCEDURE — 999N000040 HC STATISTIC CONSULT NO CHARGE VASC ACCESS

## 2022-10-14 PROCEDURE — 83880 ASSAY OF NATRIURETIC PEPTIDE: CPT | Performed by: INTERNAL MEDICINE

## 2022-10-14 RX ORDER — POTASSIUM CHLORIDE 1.5 G/1.58G
20 POWDER, FOR SOLUTION ORAL DAILY
Status: COMPLETED | OUTPATIENT
Start: 2022-10-14 | End: 2022-10-15

## 2022-10-14 RX ORDER — WATER 10 ML/10ML
INJECTION INTRAMUSCULAR; INTRAVENOUS; SUBCUTANEOUS
Status: COMPLETED
Start: 2022-10-14 | End: 2022-10-14

## 2022-10-14 RX ORDER — FINASTERIDE 5 MG/1
5 TABLET, FILM COATED ORAL DAILY
Status: DISCONTINUED | OUTPATIENT
Start: 2022-10-14 | End: 2022-10-17 | Stop reason: HOSPADM

## 2022-10-14 RX ORDER — BUMETANIDE 0.25 MG/ML
1 INJECTION INTRAMUSCULAR; INTRAVENOUS 2 TIMES DAILY
Status: DISCONTINUED | OUTPATIENT
Start: 2022-10-14 | End: 2022-10-15

## 2022-10-14 RX ORDER — METOPROLOL TARTRATE 25 MG/1
25 TABLET, FILM COATED ORAL 2 TIMES DAILY
Status: DISCONTINUED | OUTPATIENT
Start: 2022-10-14 | End: 2022-10-17 | Stop reason: HOSPADM

## 2022-10-14 RX ORDER — ATORVASTATIN CALCIUM 10 MG/1
10 TABLET, FILM COATED ORAL EVERY EVENING
Status: DISCONTINUED | OUTPATIENT
Start: 2022-10-14 | End: 2022-10-17 | Stop reason: HOSPADM

## 2022-10-14 RX ORDER — TAMSULOSIN HYDROCHLORIDE 0.4 MG/1
0.4 CAPSULE ORAL EVERY EVENING
Status: DISCONTINUED | OUTPATIENT
Start: 2022-10-14 | End: 2022-10-17 | Stop reason: HOSPADM

## 2022-10-14 RX ORDER — OLANZAPINE 10 MG/2ML
2.5 INJECTION, POWDER, FOR SOLUTION INTRAMUSCULAR 2 TIMES DAILY PRN
Status: DISCONTINUED | OUTPATIENT
Start: 2022-10-14 | End: 2022-10-17 | Stop reason: HOSPADM

## 2022-10-14 RX ORDER — POTASSIUM CHLORIDE 1500 MG/1
20 TABLET, EXTENDED RELEASE ORAL DAILY
Status: DISCONTINUED | OUTPATIENT
Start: 2022-10-14 | End: 2022-10-14 | Stop reason: CLARIF

## 2022-10-14 RX ADMIN — SODIUM PHOSPHATE, MONOBASIC, MONOHYDRATE 9 MMOL: 276; 142 INJECTION, SOLUTION INTRAVENOUS at 18:49

## 2022-10-14 RX ADMIN — DOXYCYCLINE HYCLATE 100 MG: 100 CAPSULE ORAL at 06:39

## 2022-10-14 RX ADMIN — FLUTICASONE FUROATE AND VILANTEROL TRIFENATATE 1 PUFF: 200; 25 POWDER RESPIRATORY (INHALATION) at 09:54

## 2022-10-14 RX ADMIN — OLANZAPINE 2.5 MG: 10 INJECTION, POWDER, FOR SOLUTION INTRAMUSCULAR at 13:20

## 2022-10-14 RX ADMIN — MEROPENEM 1 G: 1 INJECTION, POWDER, FOR SOLUTION INTRAVENOUS at 04:03

## 2022-10-14 RX ADMIN — WATER: 1 INJECTION INTRAMUSCULAR; INTRAVENOUS; SUBCUTANEOUS at 14:00

## 2022-10-14 RX ADMIN — TAMSULOSIN HYDROCHLORIDE 0.4 MG: 0.4 CAPSULE ORAL at 21:02

## 2022-10-14 RX ADMIN — FAMOTIDINE 20 MG: 10 INJECTION INTRAVENOUS at 09:43

## 2022-10-14 RX ADMIN — BUMETANIDE 1 MG: 0.25 INJECTION, SOLUTION INTRAMUSCULAR; INTRAVENOUS at 17:02

## 2022-10-14 RX ADMIN — POTASSIUM CHLORIDE 20 MEQ: 1.5 POWDER, FOR SOLUTION ORAL at 17:06

## 2022-10-14 RX ADMIN — ATORVASTATIN CALCIUM 10 MG: 10 TABLET, FILM COATED ORAL at 21:02

## 2022-10-14 RX ADMIN — LIDOCAINE HYDROCHLORIDE 0.5 ML: 10 INJECTION, SOLUTION INFILTRATION; PERINEURAL at 16:05

## 2022-10-14 RX ADMIN — FINASTERIDE 5 MG: 5 TABLET, FILM COATED ORAL at 17:03

## 2022-10-14 RX ADMIN — MEROPENEM 1 G: 1 INJECTION, POWDER, FOR SOLUTION INTRAVENOUS at 17:13

## 2022-10-14 RX ADMIN — UMECLIDINIUM 1 PUFF: 62.5 AEROSOL, POWDER ORAL at 09:54

## 2022-10-14 ASSESSMENT — ACTIVITIES OF DAILY LIVING (ADL)
ADLS_ACUITY_SCORE: 51

## 2022-10-14 NOTE — PROVIDER NOTIFICATION
Unit resource nurse paged on-call hospitalist HEMAL Godwin to request order for VBG or ABG d/t pt's somnolence and difficulty arousing pt. ABG ordered.

## 2022-10-14 NOTE — PROGRESS NOTES
[facility]    Hospitalist Progress Note    Brief Summary:  Hermilo Velasquez is a 89 year old male with chronic atrial fibrillation, history of PE, CKD 3, cognitive impairment, dysphagia, colonic AVMs, s/p TAVR for aortic stenosis who presents on 10/9/2022 due to decreased responsiveness.      He was recently hospitalized from 8/23 -9/8 due to acute GI bleed with hemoglobin of 5.8, thought to be due to colonic AVMs.  He was transfused 2 units of PRBCs, EGD did not show any bleeding and colonoscopy was deferred due to delirium.  Anticoagulation was discontinued.  While hospitalized he developed right-sided pneumonia thought to be due to aspiration.  Speech pathology was consulted but patient did not tolerate video swallow study and did not tolerate modified diet.  Palliative care was consulted for goals of care discussion and family decided to continue aggressive cares and full CODE STATUS.     He had episode of emesis 1 day PTA,  but overall did well.  He also had decreased urine output yesterday,  Presented to ED with decrease responsiveness,   Noted to have urinary retention and Suazo catheter was placed.  Further work-up showed sepsis-due to worsening right-sided pneumonia and probable acute cystitis.    Assessment & Plan         #.  Severe sepsis-present on admission-source right sided pneumonia versus aspiration pneumonia and   UTI  #.  Acute Metabolic encephalopathy-secondary to sepsis: resolved.     -With low blood pressure of systolic 80s, leukocytosis of 25, acute metabolic encephalopathy,  ARF patient meets criteria for severe sepsis on admission, patient is now awake, alert and oriented and breathing comfortably    -He responded very well to IV fluids-blood pressure now improve with that, renal failure resolved, eating orally now, stop IV fluids   -Lactate is normal.  CRP and procalcitonin elevated on admission, will repeat Pro calcitonin tomorrow   -Chest x-ray shows worsening  right-sided infiltrate.  Urine is mildly abnormal as well concerning for UTI  -Started on IV cefepime and doxycycline after blood cultures were obtained in ED  - Stop IV Cefepime as allergic to PCN start on IV Meropenem to cover for aspiration as well.   IS, flutter.   -Continue IV antibiotics, Blood culture no growth so far.   Overall improve, continue to wean his oxygen down, Procalcitonin improve from 7.49 to 3.01.  Encourage IS and flutter   Slight fluid overloaded as gain about 10 Ibs since admission, BNP check elevated to 20,000.  Allergic to Lasix but has tolerated Bumex in the past, will give him IV Bumex 1 mg bid and see  How he does.      #.  Aspiration pneumonia versus worsening right-sided pneumonia  #.  Dysphagia  -Was treated with IV cefepime and doxycycline during recent hospitalization due to hospital-acquired pneumonia with concern for aspiration, restarted on same medication, give possibility of aspiration will  Change cefepime to Meropenem on 10/10  -X-ray now shows worsening infiltrate in right lung  - did have episode of emesis day before admission-has known dysphagia   Consult speech for evaluation, much  More awake and alert now and wanted to eat,  Patient is started on oral diet on 10/10 and had Video swallow evaluation on 10/11,  Did well, on easy to chew and thin liquid diet.   Noticed vomiting during the procedure, will keep him on antiemetic protocol, recent  EGD was negative.   -overall improve now.       #.  UTI with hematuria  #.  Acute urinary retention  -Rodriguez catheter placed in ER with return of 800 mL of urine.  Initially there were blood and blood clots but these have not cleared  -His UA is abnormal with mild pyuria  -IV antibiotics as above  -Rodriguez catheter placed back again   -start him on Proscar and Flomax, he failed Voiding trial, will keep the rodriguez in for 2 weeks and  Have him see Urology as out patient.        #.  Recent acute GI bleed suspected to be due to colonic  AVMs-August 2022  Chronic Anemia   -Hemoglobin stable and at baseline, hemoglobin 8.5, slightly drop secondary to IV fluids  And stable now.   -Anticoagulation was discontinued previously      #.  Cognitive impairment  -Goals of care were discussed during previous admission.  Family chose to keep him full code and all aggressive treatments  Stable at this time.      #.  Chronic atrial fibrillation  #.  History of pulmonary embolism  - Toprol-XL was hold on admission due to hypotension on admission, now Afib with RVR, resume   Metoprolol now and give IV metoprolol 5 mg X 1 dose, continue Metoprolol now, Hr reasonable  Control    -Anticoagulation discontinued during previous admission due to GI bleed       #.  S/p TAVR for aortic stenosis  TTE (8/22): EF 50-55%, unable to evaluate WMA due to limited study; mean AV gradient 14 mmHg  Stable       Remain stable at this time   Continue to wean his oxygen  Slightly fluid overloaded and 10 Ib weight gain since admission BNP 20,000 will  Start IV Bumex 1 mg bid( tolerated in the past, as allergic to lasix)  Give oral potassium with Bumex.       Discussed with the patient and the nursing staff taking care of the patient today      DVT Prophylaxis: Pneumatic Compression Devices  Code Status: Full Code    Disposition: Expected discharge in 1-2 days once stable    Aniket Bowden MD, MD  Text Page  (7am - 6pm)    Interval History   Patient seen and evaluated in the morning, just woke up, offer no complaints, no SOB, no fever, chills  Headache or dizziness.     No other significant event overnight.     -Data reviewed today: I reviewed all new labs and imaging results over the last 24 hours. I personally reviewed no images or EKG's today.    Physical Exam   Temp: 98.6  F (37  C) Temp src: Oral BP: (!) 148/81 Pulse: 107   Resp: 18 SpO2: 97 % O2 Device: Oxymask Oxygen Delivery: 3 LPM  Vitals:    10/09/22 1338 10/11/22 0523 10/13/22 0643   Weight: 70.8 kg (156 lb 1.4 oz) 76.7 kg (169  lb 1.5 oz) 75.4 kg (166 lb 3.6 oz)     Vital Signs with Ranges  Temp:  [98.6  F (37  C)-99.2  F (37.3  C)] 98.6  F (37  C)  Pulse:  [] 107  Resp:  [14-20] 18  BP: (118-148)/(69-83) 148/81  SpO2:  [95 %-97 %] 97 %  I/O last 3 completed shifts:  In: 300 [P.O.:300]  Out: 2075 [Urine:2075]    Constitutional: awake, alert, cooperative, no apparent distress, and appears stated age  Eyes: Lids and lashes normal, pupils equal, round and reactive to light, extra ocular muscles intact, sclera clear, conjunctiva normal  Respiratory: No increased work of breathing, right sided crackles positive, no more  wheezing, left side clear  Cardiovascular: Normal apical impulse, regular rate and rhythm, normal S1 and S2, no S3 or S4, and no murmur noted  GI: No scars, normal bowel sounds, soft, non-distended, non-tender, no masses palpated, no hepatosplenomegally  Musculoskeletal: There is no redness, warmth, or swelling of the joints.  Full range of motion noted.  Motor strength is 5 out of 5 all extremities bilaterally.  Tone is normal.  Neurologic: Awake, alert, no focal deficit.     Medications       atorvastatin  10 mg Oral QPM     bumetanide  1 mg Intravenous BID     doxycycline hyclate  100 mg Oral Q12H     famotidine  20 mg Intravenous Daily     finasteride  5 mg Oral Daily     fluticasone-vilanterol  1 puff Inhalation Daily     meropenem  1 g Intravenous Q8H     metoprolol tartrate  25 mg Oral BID     potassium chloride  20 mEq Oral Daily     sodium chloride (PF)  3 mL Intracatheter Q8H     sodium phosphate  9 mmol Intravenous Once     tamsulosin  0.4 mg Oral QPM     umeclidinium  1 puff Inhalation Daily       Data   Recent Labs   Lab 10/14/22  0510 10/13/22  0616 10/13/22  0615 10/12/22  0521 10/11/22  0518 10/10/22  0700 10/09/22  1353 10/09/22  1353   WBC  --  7.4  --  9.6 14.1* 22.1*  --  25.3*   HGB  --  8.2*  --  7.9* 7.5* 8.7*  --  8.5*   MCV  --  92  --  96 95 99  --  97   PLT  --  180  --  173 146* 165  --  194    INR  --   --   --   --   --   --   --  1.19*     --  141 144 141 139  --  143   POTASSIUM 3.6  3.6  --  3.7 3.7 3.8 4.1  --  4.0   CHLORIDE 108  --  111* 114* 112* 111*  --  114*   CO2 27  --  27 26 25 25  --  27   BUN 14  --  16 18 25 29  --  23   CR 0.75  --  0.74 0.78 1.06 1.42*  --  1.57*   ANIONGAP 6  --  3 4 4 3  --  2*   AMRITA 8.9  --  8.9 8.4* 8.0* 7.7*  --  7.6*   GLC 87  --  95 102* 101* 113*  --  118*   ALBUMIN  --   --   --  2.2* 2.1* 2.3*   < >  --    PROTTOTAL  --   --   --   --   --  5.6*  --   --    BILITOTAL  --   --   --   --   --  1.0  --   --    ALKPHOS  --   --   --   --   --  66  --   --    ALT  --   --   --   --   --  12  --   --    AST  --   --   --   --   --  24  --   --     < > = values in this interval not displayed.       Recent Results (from the past 24 hour(s))   XR Chest Port 1 View    Narrative    EXAM: XR CHEST PORT 1 VIEW  LOCATION: [facility]  DATE/TIME: 10/14/2022 5:49 AM    INDICATION: f u PNA  COMPARISON: Chest radiograph 10/09/2022      Impression    IMPRESSION: Patchy opacity throughout the right hemithorax are similar compared to the prior allowing for differences in technique, findings again suspicious for pneumonia. Heart size is normal. Subtle opacities in the left lung are relatively unchanged.   Median sternotomy wires, unchanged.

## 2022-10-14 NOTE — PROGRESS NOTES
Care Management Follow Up    Length of Stay (days): 5    Expected Discharge Date: 10/17/2022     Concerns to be Addressed:       Patient plan of care discussed at interdisciplinary rounds: Yes    Anticipated Discharge Disposition:       Anticipated Discharge Services:    Anticipated Discharge DME:      Patient/family educated on Medicare website which has current facility and service quality ratings:    Education Provided on the Discharge Plan:    Patient/Family in Agreement with the Plan:      Referrals Placed by CM/SW:    Private pay costs discussed: Not applicable    Additional Information:  Writer called [facility] ([phone]) and spoke with Cee. Cee confirmed that patient is open to RN/PT for homecare services. Cee stated that his services are ending on 10/17 and asked that a new referral be sent over to continue care. Writer asked if they will be able to accept patient and Cee did not believe it would be a problem come Monday. Asked that orders and new referral be sent to [phone]. Referral sent via epic in communication tab.    Leeann Ruiz, PIPO, LGSW   Social Work   [facility]

## 2022-10-14 NOTE — PLAN OF CARE
Pt is A&Ox1, very somnolent this shift and unable to answer questions at times. ABG checked d/t somnolence, see labs for results. Neuros intact ex. orientation, pt unable to complete parts of neuro assessment d/t somnolence and refusal at times. VSS on 3 L oxymask ex. tachycardia at times. Lung sounds coarse and diminished. Tele afib/flutter and SR. Up w/1, gait belt, and walker. Easy to chew diet with thin liquids. CMS intact, blanchable redness to coccyx. No PRN pain medications given this shift. IV SL. Will continue to follow plan of care.

## 2022-10-14 NOTE — PROCEDURES
[facility]    Single Lumen Midline Placement    Date/Time: 10/14/2022 4:10 PM  Performed by: Angeline Manrique RN  Authorized by: Aniket Bowden MD   Indications: vascular access      UNIVERSAL PROTOCOL   Site Marked: Yes  Prior Images Obtained and Reviewed:  Yes  Required items: Required blood products, implants, devices and special equipment available    Patient identity confirmed:  Verbally with patient, arm band and hospital-assigned identification number  NA - No sedation, light sedation, or local anesthesia  Confirmation Checklist:  Patient's identity using two indicators, relevant allergies, procedure was appropriate and matched the consent or emergent situation and correct equipment/implants were available  Time out: Immediately prior to the procedure a time out was called    Universal Protocol: the Joint Commission Universal Protocol was followed    Preparation: Patient was prepped and draped in usual sterile fashion    ESBL (mL):  1     ANESTHESIA    Anesthesia: See MAR for details  Local Anesthetic:  Lidocaine 1% without epinephrine  Anesthetic Total (mL):  0.5      SEDATION    Patient Sedated: No        Preparation: skin prepped with ChloraPrep  Skin prep agent: skin prep agent completely dried prior to procedure  Sterile barriers: maximum sterile barriers were used: cap, mask, sterile gown, sterile gloves, and large sterile sheet  Hand hygiene: hand hygiene performed prior to central venous catheter insertion  Type of line used: Midline  Catheter type: single lumen  Lumen type: non-valved  Catheter size: 4 Fr  Brand: Bard  Placement method: venipuncture and ultrasound  Number of attempts: 1  Difficulty threading catheter: no  Successful placement: yes  Orientation: left    Location: brachial vein (medial)  Tip Location: distal to axillary vein  Arm circumference: adults 10 cm  Extremity circumference: 24  Visible catheter length: 1  Internal length: 19 cm  Total catheter  length: 20  Dressing and securement: blood removed, blood cleaned with CHG, site cleansed, occlusive dressing applied, statlock and chlorhexidine disc applied  Post procedure assessment: blood return through all ports  PROCEDURE   Patient Tolerance:  Patient tolerated the procedure well with no immediate complicationsDescribe Procedure: Patient and wife instructed on midline. Bother verbalized an understanding and gave verbal consent. 4Fr midline placed in medial brachial vein. Patient tolerated well. Blood return noted. RN caring for patient informed Midline is ok to use.

## 2022-10-15 ENCOUNTER — APPOINTMENT (OUTPATIENT)
Dept: SPEECH THERAPY | Facility: CLINIC | Age: 87
DRG: 871 | End: 2022-10-15
Payer: COMMERCIAL

## 2022-10-15 LAB
ANION GAP SERPL CALCULATED.3IONS-SCNC: 3 MMOL/L (ref 3–14)
BUN SERPL-MCNC: 17 MG/DL (ref 7–30)
CALCIUM SERPL-MCNC: 8.8 MG/DL (ref 8.5–10.1)
CHLORIDE BLD-SCNC: 103 MMOL/L (ref 94–109)
CO2 SERPL-SCNC: 36 MMOL/L (ref 20–32)
CREAT SERPL-MCNC: 0.79 MG/DL (ref 0.66–1.25)
GFR SERPL CREATININE-BSD FRML MDRD: 85 ML/MIN/1.73M2
GLUCOSE BLD-MCNC: 132 MG/DL (ref 70–99)
MAGNESIUM SERPL-MCNC: 1.7 MG/DL (ref 1.6–2.3)
PHOSPHATE SERPL-MCNC: 2.8 MG/DL (ref 2.5–4.5)
POTASSIUM BLD-SCNC: 3.9 MMOL/L (ref 3.4–5.3)
SODIUM SERPL-SCNC: 142 MMOL/L (ref 133–144)

## 2022-10-15 PROCEDURE — 92526 ORAL FUNCTION THERAPY: CPT | Mod: GN | Performed by: SPEECH-LANGUAGE PATHOLOGIST

## 2022-10-15 PROCEDURE — 250N000011 HC RX IP 250 OP 636: Performed by: INTERNAL MEDICINE

## 2022-10-15 PROCEDURE — 999N000190 HC STATISTIC VAT ROUNDS

## 2022-10-15 PROCEDURE — 84100 ASSAY OF PHOSPHORUS: CPT | Performed by: INTERNAL MEDICINE

## 2022-10-15 PROCEDURE — 80048 BASIC METABOLIC PNL TOTAL CA: CPT | Performed by: INTERNAL MEDICINE

## 2022-10-15 PROCEDURE — 120N000001 HC R&B MED SURG/OB

## 2022-10-15 PROCEDURE — 250N000013 HC RX MED GY IP 250 OP 250 PS 637: Performed by: INTERNAL MEDICINE

## 2022-10-15 PROCEDURE — 36415 COLL VENOUS BLD VENIPUNCTURE: CPT | Performed by: INTERNAL MEDICINE

## 2022-10-15 PROCEDURE — 83735 ASSAY OF MAGNESIUM: CPT

## 2022-10-15 PROCEDURE — 250N000013 HC RX MED GY IP 250 OP 250 PS 637

## 2022-10-15 PROCEDURE — 99232 SBSQ HOSP IP/OBS MODERATE 35: CPT | Performed by: INTERNAL MEDICINE

## 2022-10-15 RX ORDER — BUMETANIDE 0.25 MG/ML
0.5 INJECTION INTRAMUSCULAR; INTRAVENOUS 2 TIMES DAILY
Status: DISCONTINUED | OUTPATIENT
Start: 2022-10-15 | End: 2022-10-16

## 2022-10-15 RX ADMIN — METOPROLOL TARTRATE 25 MG: 25 TABLET, FILM COATED ORAL at 11:50

## 2022-10-15 RX ADMIN — FINASTERIDE 5 MG: 5 TABLET, FILM COATED ORAL at 08:01

## 2022-10-15 RX ADMIN — FLUTICASONE FUROATE AND VILANTEROL TRIFENATATE 1 PUFF: 200; 25 POWDER RESPIRATORY (INHALATION) at 11:51

## 2022-10-15 RX ADMIN — ATORVASTATIN CALCIUM 10 MG: 10 TABLET, FILM COATED ORAL at 20:57

## 2022-10-15 RX ADMIN — BUMETANIDE 0.5 MG: 0.25 INJECTION, SOLUTION INTRAMUSCULAR; INTRAVENOUS at 20:58

## 2022-10-15 RX ADMIN — FAMOTIDINE 20 MG: 10 INJECTION INTRAVENOUS at 07:53

## 2022-10-15 RX ADMIN — TAMSULOSIN HYDROCHLORIDE 0.4 MG: 0.4 CAPSULE ORAL at 20:57

## 2022-10-15 RX ADMIN — MEROPENEM 1 G: 1 INJECTION, POWDER, FOR SOLUTION INTRAVENOUS at 08:01

## 2022-10-15 RX ADMIN — MEROPENEM 1 G: 1 INJECTION, POWDER, FOR SOLUTION INTRAVENOUS at 16:56

## 2022-10-15 RX ADMIN — BUMETANIDE 1 MG: 0.25 INJECTION, SOLUTION INTRAMUSCULAR; INTRAVENOUS at 02:05

## 2022-10-15 RX ADMIN — UMECLIDINIUM 1 PUFF: 62.5 AEROSOL, POWDER ORAL at 11:51

## 2022-10-15 RX ADMIN — MEROPENEM 1 G: 1 INJECTION, POWDER, FOR SOLUTION INTRAVENOUS at 02:05

## 2022-10-15 RX ADMIN — BUMETANIDE 1 MG: 0.25 INJECTION, SOLUTION INTRAMUSCULAR; INTRAVENOUS at 08:27

## 2022-10-15 RX ADMIN — POTASSIUM CHLORIDE 20 MEQ: 1.5 POWDER, FOR SOLUTION ORAL at 11:49

## 2022-10-15 ASSESSMENT — ACTIVITIES OF DAILY LIVING (ADL)
ADLS_ACUITY_SCORE: 49
ADLS_ACUITY_SCORE: 51
ADLS_ACUITY_SCORE: 51
ADLS_ACUITY_SCORE: 49
ADLS_ACUITY_SCORE: 51
ADLS_ACUITY_SCORE: 49
ADLS_ACUITY_SCORE: 51
ADLS_ACUITY_SCORE: 49
ADLS_ACUITY_SCORE: 51
ADLS_ACUITY_SCORE: 51

## 2022-10-15 NOTE — PROGRESS NOTES
/50 (BP Location: Left arm)   Pulse 62   Temp 97.8  F (36.6  C) (Oral)   Resp 20   Wt 71.2 kg (156 lb 15.5 oz)   SpO2 96%   BMI 23.87 kg/m   .    Assessment: Patient is alert and oriented x3-4 this shift, confused on situation (3p,-7pm). Neuro's intact. Tele a flutter with PAC. Tolerating 2L NC. Suazo in place with pink tinged urine. No bowel movement this evening. Denies pain.     Pain management: Denies pain     Interventions this shift: reorient as needed, monitor oxygen needs    Goals for next shift: reorient as needed, oxygen needs    Kallie Meraz RN on 10/15/2022 at 6:57 PM

## 2022-10-15 NOTE — PROVIDER NOTIFICATION
.Notified provider about indwelling rodriguez catheter discussed removal or continued need.    Did provider choose to remove indwelling rodriguez catheter? No    Provider's rodriguez indication for keeping indwelling rodriguez catheter: Retention.    Is there an order for indwelling rodriguez catheter? Yes    *If there is a plan to keep rodriguez catheter in place at discharge daily notification with provider is not necessary.

## 2022-10-15 NOTE — PLAN OF CARE
Vital Signs stable on 2L oxygen via Nasal Cannula. Telemetry SR/SD with PACs. Alert and Oriented to Self Only. Lung sounds diminished throughout . Bowel sounds active and audible. Passing flatus. Clear liquid diet with easy to chew food. Denies nausea. Adequate urinary output via rodriguez catheter. CMS to baseline. Up with assist of 1 gait belt and walker. Pt has denied pain.  One dose of IM Zyprexa given for agitation with good results.

## 2022-10-15 NOTE — PROGRESS NOTES
[facility]    Hospitalist Progress Note    Brief Summary:  Hermilo Velasquez is a 89 year old male with chronic atrial fibrillation, history of PE, CKD 3, cognitive impairment, dysphagia, colonic AVMs, s/p TAVR for aortic stenosis who presents on 10/9/2022 due to decreased responsiveness.      He was recently hospitalized from 8/23 -9/8 due to acute GI bleed with hemoglobin of 5.8, thought to be due to colonic AVMs.  He was transfused 2 units of PRBCs, EGD did not show any bleeding and colonoscopy was deferred due to delirium.  Anticoagulation was discontinued.  While hospitalized he developed right-sided pneumonia thought to be due to aspiration.  Speech pathology was consulted but patient did not tolerate video swallow study and did not tolerate modified diet.  Palliative care was consulted for goals of care discussion and family decided to continue aggressive cares and full CODE STATUS.     He had episode of emesis 1 day PTA,  but overall did well.  He also had decreased urine output yesterday,  Presented to ED with decrease responsiveness,   Noted to have urinary retention and Suazo catheter was placed.  Further work-up showed sepsis-due to worsening right-sided pneumonia and probable acute cystitis.    Assessment & Plan         #.  Severe sepsis-present on admission-source right sided pneumonia versus aspiration pneumonia and   UTI  #.  Acute Metabolic encephalopathy-secondary to sepsis: resolved.     -With low blood pressure of systolic 80s, leukocytosis of 25, acute metabolic encephalopathy,  ARF patient meets criteria for severe sepsis on admission, patient is now awake, alert and oriented and breathing comfortably  -He responded very well to IV fluids-blood pressure now improve with that, renal failure resolved, eating orally now, stop IV fluids   -Lactate is normal.  CRP and procalcitonin elevated on admission, repeat procal improving but still elevated  -Chest x-ray shows  worsening right-sided infiltrate.  Urine is mildly abnormal as well concerning for UTI  -Started on IV cefepime and doxycycline after blood cultures were obtained in ED  - Stop IV Cefepime as allergic to PCN start on IV Meropenem to cover for aspiration as well.   IS, flutter.   -Continue IV antibiotics, Blood culture no growth so far.   Overall improve, continue to wean his oxygen down, Procalcitonin improve from 7.49 to 3.01.  Encourage IS and flutter   Slight fluid overloaded as gain about 10 Ibs since admission, BNP check elevated to 20,000.  Allergic to Lasix but has tolerated Bumex in the past, started on  IV Bumex 1 mg bid, he is tolerating  It well and had good out put with improvement in his weight.  I will continue 1 more day of IV Bumex now.      #.  Aspiration pneumonia versus worsening right-sided pneumonia  #.  Dysphagia  -Was treated with IV cefepime and doxycycline during recent hospitalization due to hospital-acquired pneumonia with concern for aspiration, restarted on same medication, give possibility of aspiration will  Change cefepime to Meropenem on 10/10  -X-ray now shows worsening infiltrate in right lung  - did have episode of emesis day before admission-has known dysphagia   Consult speech for evaluation, much  More awake and alert now and wanted to eat,  Patient is started on oral diet on 10/10 and had Video swallow evaluation on 10/11,  Did well, on easy to chew and thin liquid diet.   Noticed vomiting during the procedure, will keep him on antiemetic protocol, recent  EGD was negative.   -overall improve now. Tolerating diet well         #.  UTI with hematuria  #.  Acute urinary retention  -Suazo catheter placed in ER with return of 800 mL of urine.  Initially there were blood and blood clots but these have not cleared  -His UA is abnormal with mild pyuria  -IV antibiotics as above  -Suazo catheter placed back again   -start him on Proscar and Flomax, he failed Voiding trial, will keep  the rodriguez in for 2 weeks and  Have him see Urology as out patient.        #.  Recent acute GI bleed suspected to be due to colonic AVMs-August 2022  Chronic Anemia   -Hemoglobin stable and at baseline, hemoglobin 8.5, slightly drop secondary to IV fluids  And stable now.   -Anticoagulation was discontinued previously      #.  Cognitive impairment  -Goals of care were discussed during previous admission.  Family chose to keep him full code and all aggressive treatments  Stable at this time.      #.  Chronic atrial fibrillation  #.  History of pulmonary embolism  - Toprol-XL was hold on admission due to hypotension on admission, now Afib with RVR, resume   Metoprolol now and give IV metoprolol 5 mg X 1 dose, continue Metoprolol now, Hr reasonable  Control    -Anticoagulation discontinued during previous admission due to GI bleed       #.  S/p TAVR for aortic stenosis  TTE (8/22): EF 50-55%, unable to evaluate WMA due to limited study; mean AV gradient 14 mmHg  Stable       Remain stable at this time   Continue to wean his oxygen  Slightly fluid overloaded and 10 Ib weight gain since admission BNP 20,000 will  Start IV Bumex 1 mg bid( tolerated in the past, as allergic to lasix)  Give oral potassium with Bumex.       Discussed with the patient and the nursing staff taking care of the patient today      DVT Prophylaxis: Pneumatic Compression Devices  Code Status: Full Code    Disposition: Expected discharge Monday if remain stable.     Aniket Bowden MD, MD  Text Page  (7am - 6pm)    Interval History   Patient much more awake alert and less confused now, eating his breakfast, slept well and breathing better now.  Denies any pain.     No other significant event overnight.       -Data reviewed today: I reviewed all new labs and imaging results over the last 24 hours. I personally reviewed no images or EKG's today.    Physical Exam   Temp: 97  F (36.1  C) Temp src: Oral BP: 104/58 Pulse: 77   Resp: 16 SpO2: 96 % O2 Device:  Nasal cannula Oxygen Delivery: 3 LPM  Vitals:    10/11/22 0523 10/13/22 0643 10/15/22 0700   Weight: 76.7 kg (169 lb 1.5 oz) 75.4 kg (166 lb 3.6 oz) 71.2 kg (156 lb 15.5 oz)     Vital Signs with Ranges  Temp:  [97  F (36.1  C)-98.6  F (37  C)] 97  F (36.1  C)  Pulse:  [61-85] 77  Resp:  [16-30] 16  BP: (104-137)/(57-67) 104/58  SpO2:  [91 %-96 %] 96 %  I/O last 3 completed shifts:  In: 500 [P.O.:500]  Out: 3825 [Urine:3825]    Constitutional: awake, alert, cooperative, no apparent distress, and appears stated age  Eyes: Lids and lashes normal, pupils equal, round and reactive to light, extra ocular muscles intact, sclera clear, conjunctiva normal  Respiratory: No increased work of breathing, right sided crackles positive, no more  wheezing, left side clear  Cardiovascular: Normal apical impulse, regular rate and rhythm, normal S1 and S2, no S3 or S4, and no murmur noted  GI: No scars, normal bowel sounds, soft, non-distended, non-tender, no masses palpated, no hepatosplenomegally  Musculoskeletal: There is no redness, warmth, or swelling of the joints.  Full range of motion noted.  Motor strength is 5 out of 5 all extremities bilaterally.  Tone is normal.  Neurologic: Awake, alert, no focal deficit.     Medications       atorvastatin  10 mg Oral QPM     bumetanide  1 mg Intravenous BID     famotidine  20 mg Intravenous Daily     finasteride  5 mg Oral Daily     fluticasone-vilanterol  1 puff Inhalation Daily     meropenem  1 g Intravenous Q8H     metoprolol tartrate  25 mg Oral BID     sodium chloride (PF)  10 mL Intracatheter Q8H     sodium chloride (PF)  3 mL Intracatheter Q8H     tamsulosin  0.4 mg Oral QPM     umeclidinium  1 puff Inhalation Daily       Data   Recent Labs   Lab 10/14/22  0510 10/13/22  0616 10/13/22  0615 10/12/22  0521 10/11/22  0518 10/10/22  0700 10/09/22  1353 10/09/22  1353   WBC  --  7.4  --  9.6 14.1* 22.1*  --  25.3*   HGB  --  8.2*  --  7.9* 7.5* 8.7*  --  8.5*   MCV  --  92  --  96 95  99  --  97   PLT  --  180  --  173 146* 165  --  194   INR  --   --   --   --   --   --   --  1.19*     --  141 144 141 139  --  143   POTASSIUM 3.6  3.6  --  3.7 3.7 3.8 4.1  --  4.0   CHLORIDE 108  --  111* 114* 112* 111*  --  114*   CO2 27  --  27 26 25 25  --  27   BUN 14  --  16 18 25 29  --  23   CR 0.75  --  0.74 0.78 1.06 1.42*  --  1.57*   ANIONGAP 6  --  3 4 4 3  --  2*   AMRITA 8.9  --  8.9 8.4* 8.0* 7.7*  --  7.6*   GLC 87  --  95 102* 101* 113*  --  118*   ALBUMIN  --   --   --  2.2* 2.1* 2.3*   < >  --    PROTTOTAL  --   --   --   --   --  5.6*  --   --    BILITOTAL  --   --   --   --   --  1.0  --   --    ALKPHOS  --   --   --   --   --  66  --   --    ALT  --   --   --   --   --  12  --   --    AST  --   --   --   --   --  24  --   --     < > = values in this interval not displayed.       No results found for this or any previous visit (from the past 24 hour(s)).

## 2022-10-15 NOTE — PLAN OF CARE
Goal Outcome Evaluation:      Pt. A & O x 1 ( self only). Vitals stable on 2L O2 via NC, ex bradycardia.  Martinez pain, sob, cough or trouble breathing. Refused schedule metoprolol. Lung sounds clear, diminished bilaterally. Active BS, no BM, passing flatus. Irregular apical pulse. Tele: SR w/ bradycardia. Suazo cat in place, with adequate output. Assist of 1 with GB and walker. Easy to chew diet with thin liquids, no straws.

## 2022-10-15 NOTE — PROVIDER NOTIFICATION
aware of a-flutter rhythm and that patient is not on anticoagulation at this time.  Rate is controlled, no change in orders at this time.

## 2022-10-15 NOTE — PLAN OF CARE
Goal Outcome Evaluation:       @ o8oo, could or would not state name or . Refused oral meds. Tachypneic @ 30 BBM; SpO2 > 95 on 2l/NC. Drowsy with few stated words. By 12, alert, able to state name & ; cooperative with meds. No tachypnea. Sp02 remains > 95%. Few scattered adventitious sounds. Irreg & distant S1S2. Up with 1, GB&W. Spouse here after noon. No C/O pain when questioned. Plan: spouse wants to take pt home when ready for discharge, possibly with indwelling urinary catheter.

## 2022-10-16 LAB
ANION GAP SERPL CALCULATED.3IONS-SCNC: 6 MMOL/L (ref 3–14)
BUN SERPL-MCNC: 15 MG/DL (ref 7–30)
CALCIUM SERPL-MCNC: 8.5 MG/DL (ref 8.5–10.1)
CHLORIDE BLD-SCNC: 103 MMOL/L (ref 94–109)
CO2 SERPL-SCNC: 34 MMOL/L (ref 20–32)
CREAT SERPL-MCNC: 0.77 MG/DL (ref 0.66–1.25)
GFR SERPL CREATININE-BSD FRML MDRD: 86 ML/MIN/1.73M2
GLUCOSE BLD-MCNC: 109 MG/DL (ref 70–99)
NT-PROBNP SERPL-MCNC: 5239 PG/ML (ref 0–1800)
POTASSIUM BLD-SCNC: 3.7 MMOL/L (ref 3.4–5.3)
SODIUM SERPL-SCNC: 143 MMOL/L (ref 133–144)

## 2022-10-16 PROCEDURE — 250N000013 HC RX MED GY IP 250 OP 250 PS 637: Performed by: INTERNAL MEDICINE

## 2022-10-16 PROCEDURE — 80048 BASIC METABOLIC PNL TOTAL CA: CPT | Performed by: INTERNAL MEDICINE

## 2022-10-16 PROCEDURE — 250N000011 HC RX IP 250 OP 636: Performed by: INTERNAL MEDICINE

## 2022-10-16 PROCEDURE — 36415 COLL VENOUS BLD VENIPUNCTURE: CPT | Performed by: INTERNAL MEDICINE

## 2022-10-16 PROCEDURE — 120N000001 HC R&B MED SURG/OB

## 2022-10-16 PROCEDURE — 99233 SBSQ HOSP IP/OBS HIGH 50: CPT | Performed by: INTERNAL MEDICINE

## 2022-10-16 PROCEDURE — 83880 ASSAY OF NATRIURETIC PEPTIDE: CPT | Performed by: INTERNAL MEDICINE

## 2022-10-16 RX ORDER — BUMETANIDE 0.5 MG/1
0.5 TABLET ORAL DAILY
Status: DISCONTINUED | OUTPATIENT
Start: 2022-10-17 | End: 2022-10-17 | Stop reason: HOSPADM

## 2022-10-16 RX ORDER — CARBOXYMETHYLCELLULOSE SODIUM 5 MG/ML
1 SOLUTION/ DROPS OPHTHALMIC
Status: DISCONTINUED | OUTPATIENT
Start: 2022-10-16 | End: 2022-10-17 | Stop reason: HOSPADM

## 2022-10-16 RX ADMIN — MEROPENEM 1 G: 1 INJECTION, POWDER, FOR SOLUTION INTRAVENOUS at 01:21

## 2022-10-16 RX ADMIN — FAMOTIDINE 20 MG: 10 INJECTION INTRAVENOUS at 07:37

## 2022-10-16 RX ADMIN — FINASTERIDE 5 MG: 5 TABLET, FILM COATED ORAL at 10:09

## 2022-10-16 RX ADMIN — METOPROLOL TARTRATE 25 MG: 25 TABLET, FILM COATED ORAL at 10:09

## 2022-10-16 RX ADMIN — MEROPENEM 1 G: 1 INJECTION, POWDER, FOR SOLUTION INTRAVENOUS at 10:09

## 2022-10-16 RX ADMIN — UMECLIDINIUM 1 PUFF: 62.5 AEROSOL, POWDER ORAL at 10:55

## 2022-10-16 RX ADMIN — BUMETANIDE 0.5 MG: 0.25 INJECTION, SOLUTION INTRAMUSCULAR; INTRAVENOUS at 10:08

## 2022-10-16 RX ADMIN — MEROPENEM 1 G: 1 INJECTION, POWDER, FOR SOLUTION INTRAVENOUS at 16:40

## 2022-10-16 RX ADMIN — FLUTICASONE FUROATE AND VILANTEROL TRIFENATATE 1 PUFF: 200; 25 POWDER RESPIRATORY (INHALATION) at 10:43

## 2022-10-16 ASSESSMENT — ACTIVITIES OF DAILY LIVING (ADL)
ADLS_ACUITY_SCORE: 49

## 2022-10-16 NOTE — PLAN OF CARE
Goal Outcome Evaluation:  A/ox4 Forgetful. Pleasant, until evening time, confused after a nap. Wife visited at bedside today.Tele SR PACs BBB. Gabriele patent. Denies constipation. O2 stable. Oxymask on at night. Denies Pain. Cough productive at times. Encourage IS use. Plan to discharge home with wife with Home Care services.

## 2022-10-16 NOTE — PROGRESS NOTES
[facility]    Hospitalist Progress Note    Brief Summary:  Hermilo Velasquez is a 89 year old male with chronic atrial fibrillation, history of PE, CKD 3, cognitive impairment, dysphagia, colonic AVMs, s/p TAVR for aortic stenosis who presents on 10/9/2022 due to decreased responsiveness.      He was recently hospitalized from 8/23 -9/8 due to acute GI bleed with hemoglobin of 5.8, thought to be due to colonic AVMs.  He was transfused 2 units of PRBCs, EGD did not show any bleeding and colonoscopy was deferred due to delirium.  Anticoagulation was discontinued.  While hospitalized he developed right-sided pneumonia thought to be due to aspiration.  Speech pathology was consulted but patient did not tolerate video swallow study and did not tolerate modified diet.  Palliative care was consulted for goals of care discussion and family decided to continue aggressive cares and full CODE STATUS.     He had episode of emesis 1 day PTA,  but overall did well.  He also had decreased urine output yesterday,  Presented to ED with decrease responsiveness,   Noted to have urinary retention and Suazo catheter was placed.  Further work-up showed sepsis-due to worsening right-sided pneumonia and probable acute cystitis.    Assessment & Plan       #.  Severe sepsis-present on admission-source right sided pneumonia versus aspiration pneumonia and   UTI  #.  Acute Metabolic encephalopathy-secondary to sepsis: resolved.     -With low blood pressure of systolic 80s, leukocytosis of 25, acute metabolic encephalopathy,  ARF patient meets criteria for severe sepsis on admission, patient is now awake, alert and oriented and breathing comfortably  -He responded very well to IV fluids-blood pressure now improve with that, renal failure resolved, eating orally now, stop IV fluids   -Lactate is normal.  CRP and procalcitonin elevated on admission, repeat procal improving but still elevated  -Chest x-ray shows  worsening right-sided infiltrate.  Urine is mildly abnormal as well concerning for UTI  -Started on IV cefepime and doxycycline after blood cultures were obtained in ED  - Stop IV Cefepime as allergic to PCN start on IV Meropenem to cover for aspiration as well.   IS, flutter.   -Continue IV antibiotics, Blood culture no growth so far.   Overall improve, continue to wean his oxygen down, Procalcitonin improve from 7.49 to 3.01.  Encourage IS and flutter   Slight fluid overloaded as gain about 10 Ibs since admission, BNP check elevated to 20,000.  Allergic to Lasix but has tolerated Bumex in the past, started on  IV Bumex 1 mg bid, he is tolerating  It well and had good out put with improvement in his weight.  I will continue 1 more day of IV Bumex now.      #.  Aspiration pneumonia versus worsening right-sided pneumonia  #.  Dysphagia  -Was treated with IV cefepime and doxycycline during recent hospitalization due to hospital-acquired pneumonia with concern for aspiration, restarted on same medication, give possibility of aspiration will  Change cefepime to Meropenem on 10/10  -X-ray now shows worsening infiltrate in right lung  - did have episode of emesis day before admission-has known dysphagia   Consult speech for evaluation, much  More awake and alert now and wanted to eat,  Patient is started on oral diet on 10/10 and had Video swallow evaluation on 10/11,  Did well, on easy to chew and thin liquid diet.   Noticed vomiting during the procedure, will keep him on antiemetic protocol, recent  EGD was negative.   -overall improve now. Tolerating diet well         #.  UTI with hematuria  #.  Acute urinary retention  -Suazo catheter placed in ER with return of 800 mL of urine.  Initially there were blood and blood clots but these have not cleared  -His UA is abnormal with mild pyuria  -IV antibiotics as above  -Suazo catheter placed back again   -start him on Proscar and Flomax, he failed Voiding trial, will keep  the rodriguez in for 2 weeks and  Have him see Urology as out patient.        #.  Recent acute GI bleed suspected to be due to colonic AVMs-August 2022  Chronic Anemia   -Hemoglobin stable and at baseline, hemoglobin 8.5, slightly drop secondary to IV fluids  And stable now.   -Anticoagulation was discontinued previously      #.  Cognitive impairment  -Goals of care were discussed during previous admission.  Family chose to keep him full code and all aggressive treatments  Stable at this time.      #.  Chronic atrial fibrillation  #.  History of pulmonary embolism  - Toprol-XL was hold on admission due to hypotension on admission, now Afib with RVR, resume   Metoprolol now and give IV metoprolol 5 mg X 1 dose, continue Metoprolol now, Hr reasonable  Control    -Anticoagulation discontinued during previous admission due to GI bleed       #.  S/p TAVR for aortic stenosis  Acute exacerbation HFpEF secondary to fluid overload  TTE (8/22): EF 50-55%, unable to evaluate WMA due to limited study; mean AV gradient 14 mmHg  Stable  Found to be more SOB on 10/14 and tachycardic, BNP elevated to 20,000, started on   IV Bumex 1 mg bid with good results, his weight increase 156 to 166 Ib on 10/13,   Started on IV BUmex and now weight improve to 152 Ib on 10/16.  At this time stop the IV Bumex already got this morning to oral Bumex from tomorrow.  BNP improve to 5000 range which likely his baseline, re check again tomorrow.        Overall improving  Stop IV Bumex now and switch to oral from tomorrow  Keep rodriguez cathter in for 2 weeks   Will need oxygen on discharge  Family wants to take him home, he is a good candidate for TCU though.  If remain stable possible home tomorrow  Switch antibiotics to oral on discharge.     Discussed with the patient and the nursing staff taking care of the patient today      DVT Prophylaxis: Pneumatic Compression Devices  Code Status: Full Code    Disposition: Expected discharge Monday if remain stable.      Aniket Bowden MD, MD  Text Page  (7am - 6pm)    Interval History   Patient sleeping comfortably at time of my visit, did woke up easily, denies any pain, cough, SOB, nausea or vomiting   No other significant event overnight.       -Data reviewed today: I reviewed all new labs and imaging results over the last 24 hours. I personally reviewed no images or EKG's today.    Physical Exam   Temp: 97.4  F (36.3  C) Temp src: Oral BP: (!) 88/60 Pulse: 81   Resp: 26 SpO2: 98 % O2 Device: Nasal cannula Oxygen Delivery: 3 LPM  Vitals:    10/13/22 0643 10/15/22 0700 10/16/22 0630   Weight: 75.4 kg (166 lb 3.6 oz) 71.2 kg (156 lb 15.5 oz) 69.1 kg (152 lb 5.4 oz)     Vital Signs with Ranges  Temp:  [97.4  F (36.3  C)-98.6  F (37  C)] 97.4  F (36.3  C)  Pulse:  [44-81] 81  Resp:  [16-28] 26  BP: ()/(45-83) 88/60  SpO2:  [90 %-98 %] 98 %  I/O last 3 completed shifts:  In: 580 [P.O.:580]  Out: 2275 [Urine:2275]    Constitutional: awake, alert, cooperative, no apparent distress, and appears stated age  Eyes: Lids and lashes normal, pupils equal, round and reactive to light, extra ocular muscles intact, sclera clear, conjunctiva normal  Respiratory: No increased work of breathing, right sided crackles positive, no more  wheezing, left side clear  Cardiovascular: Normal apical impulse, regular rate and rhythm, normal S1 and S2, no S3 or S4, and no murmur noted  GI: No scars, normal bowel sounds, soft, non-distended, non-tender, no masses palpated, no hepatosplenomegally  Musculoskeletal: There is no redness, warmth, or swelling of the joints.  Full range of motion noted.  Motor strength is 5 out of 5 all extremities bilaterally.  Tone is normal.  Neurologic: Awake, alert, no focal deficit.     Medications       atorvastatin  10 mg Oral QPM     [START ON 10/17/2022] bumetanide  0.5 mg Oral Daily     famotidine  20 mg Intravenous Daily     finasteride  5 mg Oral Daily     fluticasone-vilanterol  1 puff Inhalation Daily      meropenem  1 g Intravenous Q8H     metoprolol tartrate  25 mg Oral BID     sodium chloride (PF)  10 mL Intracatheter Q8H     sodium chloride (PF)  3 mL Intracatheter Q8H     tamsulosin  0.4 mg Oral QPM     umeclidinium  1 puff Inhalation Daily       Data   Recent Labs   Lab 10/16/22  0555 10/15/22  1425 10/14/22  0510 10/13/22  0616 10/13/22  0615 10/12/22  0521 10/11/22  0518 10/10/22  0700 10/09/22  1353 10/09/22  1353   WBC  --   --   --  7.4  --  9.6 14.1* 22.1*  --  25.3*   HGB  --   --   --  8.2*  --  7.9* 7.5* 8.7*  --  8.5*   MCV  --   --   --  92  --  96 95 99  --  97   PLT  --   --   --  180  --  173 146* 165  --  194   INR  --   --   --   --   --   --   --   --   --  1.19*    142 141  --    < > 144 141 139  --  143   POTASSIUM 3.7 3.9 3.6  3.6  --    < > 3.7 3.8 4.1  --  4.0   CHLORIDE 103 103 108  --    < > 114* 112* 111*  --  114*   CO2 34* 36* 27  --    < > 26 25 25  --  27   BUN 15 17 14  --    < > 18 25 29  --  23   CR 0.77 0.79 0.75  --    < > 0.78 1.06 1.42*  --  1.57*   ANIONGAP 6 3 6  --    < > 4 4 3  --  2*   AMRTIA 8.5 8.8 8.9  --    < > 8.4* 8.0* 7.7*  --  7.6*   * 132* 87  --    < > 102* 101* 113*  --  118*   ALBUMIN  --   --   --   --   --  2.2* 2.1* 2.3*   < >  --    PROTTOTAL  --   --   --   --   --   --   --  5.6*  --   --    BILITOTAL  --   --   --   --   --   --   --  1.0  --   --    ALKPHOS  --   --   --   --   --   --   --  66  --   --    ALT  --   --   --   --   --   --   --  12  --   --    AST  --   --   --   --   --   --   --  24  --   --     < > = values in this interval not displayed.       No results found for this or any previous visit (from the past 24 hour(s)).

## 2022-10-16 NOTE — PLAN OF CARE
Goal Outcome Evaluation:       Since morning, alert, able to state name & ; cooperative with meds. Mild to mod tachypnea @ 28 BPM. Sp02 remains > 95% when pleth waveform acceptable. Upper extremities cool making accurate SpO2 reading difficult. Pt's color remains pale pink w/gray nose. Scattered adventitious sounds consisting of crackles & rhonchi. Expectorating mod amts creamy phlegm. Irreg & distant S1S2. Up with 1, GB&W. Spouse here after noon. No C/O pain when questioned. Plan: Discharge on Monday if ready.    Patient has been assessed for Home Oxygen needs. Oxygen readings:    *Pulse oximetry (SpO2) = 84% on room air at rest while awake.    *SpO2 improved to 94% on 2 liters/minute at rest.    *SpO2 = 82% on room air during activity/with exercise.    *SpO2 improved to 92% on 3 liters/minute during activity/with exercise.

## 2022-10-16 NOTE — PLAN OF CARE
Goal Outcome Evaluation:       Pt. A & O x 3, confused at times. Vitals stable on 2L O2 via oxymask. Low heart rat at tong es.  Martinez pain. Refused schedule po metoprolol. Lung sounds clear, diminished in bases. Active BS, no BM, passing flatus. Irregular apical pulse. Tele: a-flutter w/ bradycardia. Suazo cat in place, with adequate output. A-1 with GB and walker. Easy to chew diet with thin liquids, no straws.

## 2022-10-16 NOTE — PROVIDER NOTIFICATION
MD Notification    Notified Person: MD    Notified Person Name: Dennise    Notification Date/Time: 4:33 PM    Notification Interaction: VOCERA  Purpose of Notification: Could you order some refresh eye drops for patient. Pt c/o of eye dryness. Clarify if neuro checks are still needed. Thank you!      Orders Received: ordered placed by MD. Okay to discharge neuro checks    Comments:

## 2022-10-17 ENCOUNTER — APPOINTMENT (OUTPATIENT)
Dept: PHYSICAL THERAPY | Facility: CLINIC | Age: 87
DRG: 871 | End: 2022-10-17
Payer: COMMERCIAL

## 2022-10-17 VITALS
DIASTOLIC BLOOD PRESSURE: 64 MMHG | HEART RATE: 65 BPM | TEMPERATURE: 98.4 F | SYSTOLIC BLOOD PRESSURE: 123 MMHG | OXYGEN SATURATION: 99 % | BODY MASS INDEX: 23.16 KG/M2 | RESPIRATION RATE: 16 BRPM | WEIGHT: 152.34 LBS

## 2022-10-17 LAB
ANION GAP SERPL CALCULATED.3IONS-SCNC: 4 MMOL/L (ref 3–14)
BUN SERPL-MCNC: 18 MG/DL (ref 7–30)
CALCIUM SERPL-MCNC: 8.4 MG/DL (ref 8.5–10.1)
CHLORIDE BLD-SCNC: 104 MMOL/L (ref 94–109)
CO2 SERPL-SCNC: 33 MMOL/L (ref 20–32)
CREAT SERPL-MCNC: 0.72 MG/DL (ref 0.66–1.25)
GFR SERPL CREATININE-BSD FRML MDRD: 87 ML/MIN/1.73M2
GLUCOSE BLD-MCNC: 111 MG/DL (ref 70–99)
MAGNESIUM SERPL-MCNC: 1.8 MG/DL (ref 1.6–2.3)
NT-PROBNP SERPL-MCNC: 3394 PG/ML (ref 0–1800)
POTASSIUM BLD-SCNC: 4 MMOL/L (ref 3.4–5.3)
SODIUM SERPL-SCNC: 141 MMOL/L (ref 133–144)

## 2022-10-17 PROCEDURE — 83735 ASSAY OF MAGNESIUM: CPT

## 2022-10-17 PROCEDURE — 36415 COLL VENOUS BLD VENIPUNCTURE: CPT | Performed by: INTERNAL MEDICINE

## 2022-10-17 PROCEDURE — 250N000011 HC RX IP 250 OP 636: Performed by: INTERNAL MEDICINE

## 2022-10-17 PROCEDURE — 97116 GAIT TRAINING THERAPY: CPT | Mod: GP

## 2022-10-17 PROCEDURE — 80048 BASIC METABOLIC PNL TOTAL CA: CPT | Performed by: INTERNAL MEDICINE

## 2022-10-17 PROCEDURE — 99239 HOSP IP/OBS DSCHRG MGMT >30: CPT | Performed by: INTERNAL MEDICINE

## 2022-10-17 PROCEDURE — 83880 ASSAY OF NATRIURETIC PEPTIDE: CPT | Performed by: INTERNAL MEDICINE

## 2022-10-17 PROCEDURE — 97530 THERAPEUTIC ACTIVITIES: CPT | Mod: GP

## 2022-10-17 PROCEDURE — 999N000190 HC STATISTIC VAT ROUNDS

## 2022-10-17 PROCEDURE — 250N000013 HC RX MED GY IP 250 OP 250 PS 637: Performed by: INTERNAL MEDICINE

## 2022-10-17 RX ORDER — FINASTERIDE 5 MG/1
5 TABLET, FILM COATED ORAL DAILY
Qty: 30 TABLET | Refills: 0 | Status: SHIPPED | OUTPATIENT
Start: 2022-10-18 | End: 2023-01-12

## 2022-10-17 RX ORDER — BUMETANIDE 0.5 MG/1
0.5 TABLET ORAL DAILY
Qty: 30 TABLET | Refills: 0 | Status: SHIPPED | OUTPATIENT
Start: 2022-10-18 | End: 2023-01-12

## 2022-10-17 RX ORDER — TAMSULOSIN HYDROCHLORIDE 0.4 MG/1
0.4 CAPSULE ORAL EVERY EVENING
Qty: 30 CAPSULE | Refills: 0 | Status: SHIPPED | OUTPATIENT
Start: 2022-10-17 | End: 2023-01-12

## 2022-10-17 RX ADMIN — FAMOTIDINE 20 MG: 10 INJECTION INTRAVENOUS at 08:39

## 2022-10-17 RX ADMIN — METOPROLOL TARTRATE 25 MG: 25 TABLET, FILM COATED ORAL at 12:08

## 2022-10-17 RX ADMIN — FINASTERIDE 5 MG: 5 TABLET, FILM COATED ORAL at 08:46

## 2022-10-17 RX ADMIN — FLUTICASONE FUROATE AND VILANTEROL TRIFENATATE 1 PUFF: 200; 25 POWDER RESPIRATORY (INHALATION) at 11:39

## 2022-10-17 RX ADMIN — MEROPENEM 1 G: 1 INJECTION, POWDER, FOR SOLUTION INTRAVENOUS at 08:39

## 2022-10-17 RX ADMIN — BUMETANIDE 0.5 MG: 0.5 TABLET ORAL at 08:46

## 2022-10-17 RX ADMIN — UMECLIDINIUM 1 PUFF: 62.5 AEROSOL, POWDER ORAL at 12:08

## 2022-10-17 RX ADMIN — MEROPENEM 1 G: 1 INJECTION, POWDER, FOR SOLUTION INTRAVENOUS at 00:15

## 2022-10-17 ASSESSMENT — ACTIVITIES OF DAILY LIVING (ADL)
ADLS_ACUITY_SCORE: 49
ADLS_ACUITY_SCORE: 52
ADLS_ACUITY_SCORE: 49
ADLS_ACUITY_SCORE: 52
ADLS_ACUITY_SCORE: 49
ADLS_ACUITY_SCORE: 52

## 2022-10-17 NOTE — PROGRESS NOTES
Care Management Discharge Note    Discharge Date: 10/17/2022       Discharge Disposition: Home    Discharge Services: Home care RN/PT     Discharge DME: oxygen     Discharge Transportation: family or friend will provide    Private pay costs discussed: Not applicable    PAS Confirmation Code:  NA  Patient/family educated on Medicare website which has current facility and service quality ratings:  NA    Education Provided on the Discharge Plan: yes   Persons Notified of Discharge Plans: Patient, Nsg and his spouse  Patient/Family in Agreement with the Plan: yes     Handoff Referral Completed: Yes    Additional Information:  Patient is discharging home this afternoon after his oxygen is delivered.  Conversed with patient's Spouse Roberta to make sure she was able to physically help him with mobility.  She has seen patient ambulate and is confident that he will do fine transitioning to home.  Roberta has rescheduled the PCP appointment originally scheduled for today to 10/26.  Patient has a CT scheduled for tomorrow and then will follow-up with Oncology as previously scheduled, on Friday 10/21/22.  Patient will have home care thru [facility].  Orders have been faxed.      Ale Gonsales RN   Inpatient Care Management  [phone]

## 2022-10-17 NOTE — DISCHARGE SUMMARY
[facility]    Discharge Summary  Hospitalist    Date of Admission:  10/9/2022  Date of Discharge:  10/17/2022  Discharging Provider: Aniket Bowden MD, MD  Date of Service (when I saw the patient): 10/17/22    Discharge Diagnoses   Please refer below     History of Present Illness   Hermilo Velasquez is an 89 year old male who presented with decrease responsiveness     Hospital Course   Hermilo Velasquez is a 89 year old male with chronic atrial fibrillation, history of PE, CKD 3, cognitive impairment, dysphagia, colonic AVMs, s/p TAVR for aortic stenosis who presents on 10/9/2022 due to decreased responsiveness.      He was recently hospitalized from 8/23 -9/8 due to acute GI bleed with hemoglobin of 5.8, thought to be due to colonic AVMs.  He was transfused 2 units of PRBCs, EGD did not show any bleeding and colonoscopy was deferred due to delirium.  Anticoagulation was discontinued.  While hospitalized he developed right-sided pneumonia thought to be due to aspiration.  Speech pathology was consulted but patient did not tolerate video swallow study and did not tolerate modified diet.  Palliative care was consulted for goals of care discussion and family decided to continue aggressive cares and full CODE STATUS.     He had episode of emesis 1 day PTA,  but overall did well.  He also had decreased urine output on admission,  Presented to ED with decrease responsiveness,   Noted to have urinary retention and Suazo catheter was placed.  Further work-up showed sepsis-due to worsening right-sided pneumonia and probable acute cystitis, and CHF with acute respiratory failure.         Final Discharge Diagnosis and Hospital Course          #.  Severe sepsis-present on admission-source right sided pneumonia versus aspiration pneumonia   #.  Acute Metabolic encephalopathy-secondary to sepsis: resolved.      -With low blood pressure of systolic 80s, leukocytosis of 25, acute metabolic  encephalopathy,  ARF patient meets criteria for severe sepsis on admission, patient is now awake, alert and oriented and breathing comfortably  -He responded very well to IV fluids-blood pressure now improve with that, renal failure resolved, eating orally now, stop IV fluids   -Lactate is normal.  CRP and procalcitonin elevated on admission, repeat procal improving   -Chest x-ray shows worsening right-sided infiltrate.  Urine is mildly abnormal as well concerning for UTI but urine  Culture remain negative.   -Started on IV cefepime and doxycycline after blood cultures were obtained in ED  - Stop IV Cefepime as allergic to PCN start on IV Meropenem to cover for aspiration as well.   IS, flutter, He completed 5 days of Doxycyline and 7 days of Meropenem and total 8 days of IV antibiotics  While in the hospital for the treatment of pneumonia.   , Blood culture no growth so far.   Overall improve, continue to wean his oxygen down, Procalcitonin improve from 7.49 to 3.01.  Encourage IS and flutter      #.  Aspiration pneumonia versus worsening right-sided pneumonia  #.  Dysphagia  -Was treated with IV cefepime and doxycycline during recent hospitalization due to hospital-acquired pneumonia with concern for aspiration, restarted on same medication, give possibility of aspiration will  Change cefepime to Meropenem on 10/10  -X-ray now shows worsening infiltrate in right lung  - did have episode of emesis day before admission-has known dysphagia   Consult speech for evaluation, much  More awake and alert now and wanted to eat,  Patient is started on oral diet on 10/10 and had Video swallow evaluation on 10/11,  Did well, on easy to chew and thin liquid diet.   Noticed vomiting during the procedure, will keep him on antiemetic protocol, recent  EGD was negative.   -overall improve now. Tolerating diet well, no more nausea or vomiting at this time    Acute Hypoxic Respiratory Failure   Acute exacerbation HFpEF secondary to  fluid overload  #.  S/p TAVR for aortic stenosis  TTE (8/22): EF 50-55%, unable to evaluate WMA due to limited study; mean AV gradient 14 mmHg  Stable  Found to be more SOB on 10/14 and tachycardic, BNP elevated to 20,000,   fluid overloaded as gain about 10 Ibs since admission, BNP check elevated to 20,000.  Allergic to Lasix but has tolerated Bumex in the past, started on  IV Bumex 1 mg bid, he is tolerating  It well and had good out put with improvement in his weight.  Continue on IV Bumex 1 mg bid for 2 days then decrease to 0.5 mg bid and now oral 0.5 mg  Daily, has significant urine out put.   His weight is now down to 152 Ib from 169 Ib, likely his dry weight.   Good urine out put, BNP now down to 3,394.  He remain on supplemental oxygen even at rest, try to wean him and he desaturate to 86%.  He need 2 liter of oxygen at rest and 2-3 liter with activity.   He will be discharge home on supplemental oxygen.         #.  Acute urinary retention  -Rodriguez catheter placed in ER with return of 800 mL of urine.  Initially there were blood and blood clots but these have not cleared  -His UA is abnormal with mild pyuria, but urine culture remain negative.   -Rodriguez catheter placed back again   -start him on Proscar and Flomax, keep him on Rodriguez catheter while in the hospital.  Did give him the voiding trial on day of discharge and he is now able to pass urine with minimal  Post void urine. He will go home on Proscar and Flomax. Remove rodriguez catheter on discharge.         #.  Recent acute GI bleed suspected to be due to colonic AVMs-August 2022  Chronic Anemia   -Hemoglobin stable and at baseline, hemoglobin 8.5, slightly drop secondary to IV fluids  And stable now.   -Anticoagulation was discontinued previously      #.  Cognitive impairment  -Goals of care were discussed during previous admission.  Family chose to keep him full code and all aggressive treatments  Stable at this time.      #.  Chronic atrial  fibrillation  #.  History of pulmonary embolism  - Toprol-XL was hold on admission due to hypotension on admission, now Afib with RVR, resume   Metoprolol now and give IV metoprolol 5 mg X 1 dose, continue Metoprolol now, Hr reasonable  Control    -Anticoagulation discontinued during previous admission due to GI bleed                Overall improving  Stop IV Bumex now and switch to oral from tomorrow  Keep michael cathter in for 2 weeks   Will need oxygen on discharge  Family wants to take him home, he is a good candidate for TCU though.  If remain stable possible home tomorrow  Switch antibiotics to oral on discharge.      Discussed with the patient and the nursing staff taking care of the patient today      DVT Prophylaxis: Pneumatic Compression Devices  Code Status: Full Code     Disposition: Expected discharge Monday if remain stable  Aniket Bowden MD, MD    Significant Results and Procedures       Pending Results   These results will be followed up by PCP  Unresulted Labs Ordered in the Past 30 Days of this Admission     No orders found from 9/9/2022 to 10/10/2022.          Code Status   Full Code       Primary Care Physician   Karson Bishop    Physical Exam   Temp: 98.4  F (36.9  C) Temp src: Axillary BP: 123/64 Pulse: 65   Resp: 16 SpO2: 99 % O2 Device: Oxymask Oxygen Delivery: 3 LPM  Vitals:    10/13/22 0643 10/15/22 0700 10/16/22 0630   Weight: 75.4 kg (166 lb 3.6 oz) 71.2 kg (156 lb 15.5 oz) 69.1 kg (152 lb 5.4 oz)     Vital Signs with Ranges  Temp:  [98.2  F (36.8  C)-99.8  F (37.7  C)] 98.4  F (36.9  C)  Pulse:  [60-90] 65  Resp:  [16-26] 16  BP: (108-125)/(54-76) 123/64  SpO2:  [80 %-99 %] 99 %  I/O last 3 completed shifts:  In: 510 [P.O.:510]  Out: 1375 [Urine:1375]    Constitutional: awake, alert, cooperative, no apparent distress, and appears stated age  Eyes: Lids and lashes normal, pupils equal, round and reactive to light, extra ocular muscles intact, sclera clear, conjunctiva  normal  Respiratory: No increased work of breathing, good air exchange, clear to auscultation bilaterally, no crackles or wheezing  Cardiovascular: Normal apical impulse, regular rate and rhythm, normal S1 and S2, no S3 or S4, and no murmur noted  GI: No scars, normal bowel sounds, soft, non-distended, non-tender, no masses palpated, no hepatosplenomegally  Musculoskeletal: no lower extremity pitting edema present  Neurologic: no focal deficit.     Discharge Disposition   Discharged to home with home health   Condition at discharge: Stable    Consultations This Hospital Stay   SPEECH LANGUAGE PATH ADULT IP CONSULT  PHYSICAL THERAPY ADULT IP CONSULT  VASCULAR ACCESS ADULT IP CONSULT  GASTROENTEROLOGY IP CONSULT  VASCULAR ACCESS ADULT IP CONSULT  CARE MANAGEMENT / SOCIAL WORK IP CONSULT  VASCULAR ACCESS ADULT IP CONSULT    Time Spent on this Encounter   IAniket MD, personally saw the patient today and spent greater than 30 minutes discharging this patient.    Discharge Orders      Medication Therapy Management Referral      Home Care Referral      Reason for your hospital stay    Pneumonia, acute respiratory failure and CHF     Follow-up and recommended labs and tests     Follow up with primary care provider, Karson Bishop, within 7 days for hospital follow- up.  The following labs/tests are recommended: cbc, bmp .     Activity    Your activity upon discharge: activity as tolerated     Oxygen Adult/Peds    Oxygen Documentation:   I certify that this patient, Hermilo Velasquez has been under my care (or a nurse practitioner or physician's assistant working with me). This is the face-to-face encounter for oxygen medical necessity.      Hermilo Velasquez is now in a chronic stable state and continues to require supplemental oxygen. Patient has continued oxygen desaturation due to Chronic Heart Failure I50  Chronic Respiratory Failure with Hypoxia J96.11.    Alternative treatment(s) tried or considered and  deemed clinically infective for treatment of Chronic Heart Failure I50  Chronic Respiratory Failure with Hypoxia J96.11 include inhalers, pulmonary toileting, pulmonary rehab, and diuretics .  If portability is ordered, is the patient mobile within the home? yes    **Patients who qualify for home O2 coverage under the CMS guidelines require ABG tests or O2 sat readings obtained closest to, but no earlier than 2 days prior to the discharge, as evidence of the need for home oxygen therapy. Testing must be performed while patient is in the chronic stable state. See notes for O2 sats.**     Diet    Follow this diet upon discharge: Orders Placed This Encounter      Combination Diet Easy to Chew (level 7); Thin Liquids (level 0) (with a chin tuck and 2 swallows); No Straws     Discharge Medications   Current Discharge Medication List      START taking these medications    Details   bumetanide (BUMEX) 0.5 MG tablet Take 1 tablet (0.5 mg) by mouth daily  Qty: 30 tablet, Refills: 0    Associated Diagnoses: Chronic diastolic congestive heart failure (H)      finasteride (PROSCAR) 5 MG tablet Take 1 tablet (5 mg) by mouth daily  Qty: 30 tablet, Refills: 0    Associated Diagnoses: Urinary retention      tamsulosin (FLOMAX) 0.4 MG capsule Take 1 capsule (0.4 mg) by mouth every evening  Qty: 30 capsule, Refills: 0    Associated Diagnoses: Urinary retention         CONTINUE these medications which have NOT CHANGED    Details   acetaminophen (TYLENOL) 500 MG tablet Take 1,000 mg by mouth 3 times daily as needed      albuterol (PROAIR HFA/PROVENTIL HFA/VENTOLIN HFA) 108 (90 Base) MCG/ACT inhaler Inhale 2 puffs into the lungs every 6 hours as needed      albuterol (PROVENTIL) (2.5 MG/3ML) 0.083% neb solution Take 1 vial (2.5 mg) by nebulization every 6 hours as needed for shortness of breath / dyspnea or wheezing  Qty: 180 mL, Refills: 11      famotidine (PEPCID) 40 MG tablet Take 40 mg by mouth 2 times daily      ferrous sulfate (FE  "TABS) 325 (65 Fe) MG EC tablet Take 1 tablet (325 mg) by mouth 2 times daily      fluticasone-vilanterol (BREO ELLIPTA) 200-25 MCG/INH inhaler Inhale 1 puff into the lungs daily      gabapentin (NEURONTIN) 100 MG capsule Take 2 capsules (200 mg) by mouth At Bedtime    Associated Diagnoses: Gastrointestinal hemorrhage, unspecified gastrointestinal hemorrhage type      metoprolol tartrate (LOPRESSOR) 25 MG tablet Take 1 tablet (25 mg) by mouth 2 times daily    Associated Diagnoses: Gastrointestinal hemorrhage, unspecified gastrointestinal hemorrhage type      OLANZapine (ZYPREXA) 5 MG tablet Take 1 tablet (5 mg) by mouth 2 times daily as needed (cognitive impairment)      tiotropium (SPIRIVA) 18 MCG inhaled capsule Inhale 18 mcg into the lungs daily      atorvastatin (LIPITOR) 10 MG tablet Take 1 tablet (10 mg) by mouth daily  Qty: 90 tablet, Refills: 2         STOP taking these medications       alfuzosin ER (UROXATRAL) 10 MG 24 hr tablet Comments:   Reason for Stopping:         apixaban ANTICOAGULANT (ELIQUIS) 5 MG tablet Comments:   Reason for Stopping:             Allergies   Allergies   Allergen Reactions     Omeprazole Itching     Pantoprazole Itching     Prevacid [Lansoprazole] Itching     Lasix [Furosemide] Rash     Lidocaine Blisters and Rash     Allergy to lidocaine ointment  Allergy to lidocaine ointment       Penicillin G Rash     Penicillins Rash     \"broke out from injection\" 60 yrs ago  Tolerates cephalosporins     Data   Most Recent 3 CBC's:Recent Labs   Lab Test 10/13/22  0616 10/12/22  0521 10/11/22  0518   WBC 7.4 9.6 14.1*   HGB 8.2* 7.9* 7.5*   MCV 92 96 95    173 146*      Most Recent 3 BMP's:  Recent Labs   Lab Test 10/17/22  0648 10/16/22  0555 10/15/22  1425    143 142   POTASSIUM 4.0 3.7 3.9   CHLORIDE 104 103 103   CO2 33* 34* 36*   BUN 18 15 17   CR 0.72 0.77 0.79   ANIONGAP 4 6 3   AMRITA 8.4* 8.5 8.8   * 109* 132*     Most Recent 2 LFT's:  Recent Labs   Lab Test " 10/10/22  0700 08/31/22  0829   AST 24 10   ALT 12 11   ALKPHOS 66 80   BILITOTAL 1.0 0.4     Most Recent INR's and Anticoagulation Dosing History:  Anticoagulation Dose History     Recent Dosing and Labs Latest Ref Rng & Units 3/28/2020 8/16/2020 8/17/2020 10/23/2020 5/8/2022 8/23/2022 10/9/2022    INR 0.85 - 1.15 1.03 1.30(H) 1.21(H) 1.39(H) 1.99(H) 1.65(H) 1.19(H)        Most Recent 3 Troponin's:  Recent Labs   Lab Test 04/14/21  0506 04/14/21  0039 04/13/21 2038   TROPI 0.513* 0.529* 0.331*     Most Recent Cholesterol Panel:  Recent Labs   Lab Test 11/30/21  1123   CHOL 138   LDL 71   HDL 55   TRIG 58     Most Recent 6 Bacteria Isolates From Any Culture (See EPIC Reports for Culture Details):  Recent Labs   Lab Test 04/14/21  0815 04/13/21  2054 04/13/21 2038 09/13/20  1329 09/13/20  1256 08/15/20  1451   CULT Canceled, Test credited  >10 Squamous epithelial cells/low power field indicates oral contamination. Please   recollect.  *  Notification of test cancellation was given to  Elva Ospina RN [id number] 4/14/20 by AM   No growth No growth No growth No growth No growth  No growth     Most Recent TSH, T4 and A1c Labs:  Recent Labs   Lab Test 09/08/21  1459 07/31/18  1104 02/21/18  1124   TSH 2.46 2.88 2.38   T4  --   --  1.21   A1C  --  5.8*  --      Results for orders placed or performed during the hospital encounter of 10/09/22   CT Head w/o Contrast    Narrative    EXAM: CT HEAD WITHOUT CONTRAST  LOCATION: [facility]  DATE/TIME: 10/09/2022, 1:54 PM    INDICATION: Altered mental status.  COMPARISON: 08/27/2022.  TECHNIQUE: Routine CT Head without IV contrast. Multiplanar reformats. Dose reduction techniques were used.    FINDINGS:  INTRACRANIAL CONTENTS: No acute intracranial hemorrhage. Stable prominence of CSF bifrontal level may reflect atrophy or chronic subdural fluid collections, unchanged from prior study. No CT evidence of acute infarct. Mild to moderate presumed chronic    small vessel ischemic changes. Moderate generalized volume loss. No hydrocephalus. Cerebellar tonsillar position is satisfactory. Vascular calcification. Sella is normal. Corpus callosum is intact.     VISUALIZED ORBITS/SINUSES/MASTOIDS: Prior bilateral cataract surgery. Visualized portions of the orbits are otherwise unremarkable. Secretions and partial opacification right maxillary sinus with osteitis suggests acute on chronic sinonasal inflammatory   changes. Membrane thickening elsewhere ethmoid air cells. Polypoid membrane thickening floor of the left maxillary sinus. Scattered fluid/membrane thickening in the left mastoid air cells. No apparent mass in the posterior nasopharynx or skull base.    BONES/SOFT TISSUES: No evidence for fracture of the calvarium or skull base. Mild degenerative changes both TMJs. Mild demineralization. No swelling of the facial or scalp tissues.      Impression    IMPRESSION:  1.  No CT evidence for acute intracranial process.    2.  Brain atrophy and presumed chronic microvascular ischemic changes as above.    3.  Nothing for acute or evolving infarction. No hyperdense hemorrhage.    4.  Stable intracranial appearance from 08/27/2022.    5.  Results discussed with referring clinician Dr. Montiel on 10/09/2022 at 2:00 PM.     CTA Head Neck w Contrast    Narrative    EXAM: CTA HEAD NECK WITH CONTRAST  LOCATION: [facility]  DATE/TIME: 10/09/2022, 1:56 PM    INDICATION: Altered mental status.  COMPARISON: Head CT 10/09/2022.  CONTRAST: 75 mL Isovue 370.  TECHNIQUE: Head and neck CT angiogram with IV contrast. Noncontrast head CT followed by axial helical CT images of the head and neck vessels obtained during the arterial phase of intravenous contrast administration. Axial 2D reconstructed images and   multiplanar 3D MIP reconstructed images of the head and neck vessels were performed by the technologist. Dose reduction techniques were used. All stenosis  measurements made according to NASCET criteria unless otherwise specified.    FINDINGS:     NONCONTRAST HEAD CT:   Described separately.    HEAD CTA:  ANTERIOR CIRCULATION: No stenosis/occlusion, aneurysm, or high-flow vascular malformation. Standard [place] anatomy. Satisfactory peripheral branch arborization of the YASHIRA and MCA vascular territories. Nonstenotic calcification of the cavernous   ICA segments.    POSTERIOR CIRCULATION: No stenosis/occlusion, aneurysm, or high-flow vascular malformation. Balanced vertebral arteries supply a normal basilar artery. Satisfactory peripheral branch arborization PCA vascular territory. Mild ectasia at the basilar tip   series 13 image 49.    DURAL VENOUS SINUSES: Expected enhancement of the major dural venous sinuses.    NECK CTA:  RIGHT CAROTID: Calcification at the bifurcation and proximal right ICA with mild proximal right ICA stenosis probably 20-30% in degree. No high-grade or tandem stenosis or dissection.    LEFT CAROTID: Nonstenotic calcification mid/distal left CCA eccentrically. Calcification at the bifurcation and proximal left ICA with mild to moderate proximal left ICA narrowing 30-50% series 10 image 261. No additional or tandem ICA stenosis is   present. No dissection.    VERTEBRAL ARTERIES: No focal stenosis or dissection. Balanced vertebral arteries.    AORTIC ARCH: Classic aortic arch anatomy with no significant stenosis at the origin of the great vessels. Tortuosity without stenosis of the right innominate arterial origin and proximal subclavian arteries bilaterally.    NONVASCULAR STRUCTURES: No pathologic enhancement is evident intracranially. No pathologic orbital enhancement with procedural changes both globes. Partial opacification paranasal sinuses. No evidence for mass or adenopathy within the neck soft tissues.   Multilevel degenerative cervical spondylosis. No severe spinal stenosis is present with a few levels of mild to moderate  foraminal compromise. Asymmetric coarsened infiltrate throughout much of the right mid and upper lung with some emphysematous changes   left lung. Correlate for unilateral right-sided pneumonitis. Possible area of focal infiltrate or volume of the azygos at the right retrotracheal level. Sternotomy. Patent airway. Satisfactory cervical prevertebral soft tissues.      Impression    IMPRESSION:   HEAD CT:  1.  Described separately.    HEAD CTA:   1.  No hemodynamic stenosis. No evidence for large vessel occlusion, dissection or aneurysm. Satisfactory peripheral branch arborization YASHIRA, MCA and PCA vascular territory. No pathologic enhancement. Dural venous sinus patterns of enhancement are   normal.    NECK CTA:  1.  No high-grade stenosis or dissection. Mild narrowing ICA origins bilaterally.    2.  Infiltrate right mid and upper lung noted.    3.  See above for details and description.    4.  Discussed with Dr. Montiel at 2:16 PM on 10/09/2022.     CT Head Perfusion w Contrast    Narrative    EXAM: CT HEAD PERFUSION W CONTRAST  LOCATION: [facility]  DATE/TIME: 10/9/2022 2:15 PM    INDICATION: Altered mental status  COMPARISON: CT/CT angiogram head and neck performed today  TECHNIQUE: CT cerebral perfusion was performed utilizing a second contrast bolus. Perfusion data were post processed with generation of standard perfusion maps and estimation of ischemic/infarcted volumes utilizing standard threshold values. Dose   reduction techniques were used. All stenosis measurements made according to NASCET criteria unless otherwise specified.  CONTRAST: 50mL Isovue 370  COMPARISON: None.    FINDINGS:     CT PERFUSION:  PERFUSION MAPS: Symmetrical cerebral perfusion. No focal deficits in cerebral blood flow or volume to suggest ischemia/oligemia.    RAPID ANALYSIS:  CBF<30%: 0 mL  Tmax>6sec: 0 mL  Mismatch volume: 0 mL  Mismatch ratio: None    CT PERFUSION:  1.  Symmetric cerebral perfusion.. No focal  perfusion defects or cerebral blood flow/volume are noted.   MR Brain w/o Contrast    Narrative    EXAM: MR BRAIN W/O CONTRAST  LOCATION: [facility]  DATE/TIME: 10/9/2022 6:52 PM    INDICATION: Encephalopathy with questionable left sided focal weakness  COMPARISON: CT head 10/09/2022  TECHNIQUE: Routine multiplanar multisequence head MRI without intravenous contrast.    FINDINGS:  INTRACRANIAL CONTENTS: No acute or subacute infarct. No mass, acute hemorrhage, or extra-axial fluid collections. Patchy nonspecific T2/FLAIR hyperintensities within the cerebral white matter most consistent with mild to moderate chronic microvascular   ischemic change. Mild to moderate generalized cerebral atrophy. No hydrocephalus. Normal position of the cerebellar tonsils.     SELLA: No abnormality accounting for technique.    OSSEOUS STRUCTURES/SOFT TISSUES: Normal marrow signal. The major intracranial vascular flow voids are maintained.     ORBITS: No abnormality accounting for technique.     SINUSES/MASTOIDS: Mild to moderate mucosal thickening scattered about the paranasal sinuses. No middle ear or mastoid effusion.       Impression    IMPRESSION:  1.  No acute intracranial process.  2.  Generalized brain atrophy and presumed microvascular ischemic changes as detailed above.   XR Chest 2 Views    Narrative    EXAM: XR CHEST 2 VIEWS  LOCATION: [facility]  DATE/TIME: 10/09/2022, 4:02 PM    INDICATION: AMS.  COMPARISON: 08/27/2022.      Impression    IMPRESSION: Patchy opacifications throughout the right hemithorax have clearly worsened since previous exam, especially the right upper lobe. Findings concerning for pneumonia, perhaps with superimposed chronic pulmonary disease. Heart size is normal.   Subtle opacities in the left lung are relatively unchanged. Median sternotomy wires, unchanged.     CT Abdomen Pelvis w/o Contrast    Narrative    EXAM: CT ABDOMEN PELVIS W/O  CONTRAST  LOCATION: [facility]  DATE/TIME: 10/9/2022 4:29 PM    INDICATION: fever, ams  COMPARISON: Same day chest radiograph, CT 04/10/2022, 08/16/2020  TECHNIQUE: CT scan of the abdomen and pelvis was performed without IV contrast. Multiplanar reformats were obtained. Dose reduction techniques were used.  CONTRAST: None.    FINDINGS:   LOWER CHEST: Partially visualized airspace disease in the right lower and middle lobes. There is a small right pleural effusion with thickened and enhancing pleura noted (series 2 image 32).    HEPATOBILIARY: Multiple small well-circumscribed hypodense hepatic lesions, likely cysts, present to some degree since at least 2011, no specific follow-up recommended.    PANCREAS: Normal.    SPLEEN: Normal.    ADRENAL GLANDS: Normal.    KIDNEYS/BLADDER: Persistent nephrograms with excreted contrast in the renal collecting system bilaterally. Nonspecific perinephric stranding. No hydronephrosis. Urinary bladder is distended with mild wall thickening.    BOWEL: No obstruction or inflammatory change.    LYMPH NODES: Normal.    VASCULATURE: Unchanged saccular infrarenal abdominal aortic aneurysm measuring up to 3.9 cm (series 2 image 124). Severe calcified atherosclerosis.    PELVIC ORGANS: Prostatomegaly with likely prior TURP.    MUSCULOSKELETAL: No acute bony abnormality. Healed right rib fractures. Mild body wall edema.        Impression    IMPRESSION:   1.  Sequela of chronic bladder outlet obstruction without hydronephrosis. Nonspecific perinephric stranding. Recommend correlation with urinalysis.  2.  No additional infectious source visualized in the abdomen or pelvis.  3.  Partially visualized airspace disease in the right lung with small right pleural effusion with pleural thickening and enhancement, suggestive of exudative effusion, developing empyema is possible.     XR Video Swallow with SLP or OT    Narrative    VIDEO SWALLOWING EVALUATION   10/11/2022  9:57 AM     HISTORY: Concern for silent aspiration.    COMPARISON: None.    FLUOROSCOPY TIME: 1.8 minutes     Number of cine runs: 9    FINDINGS:    Thin: Moderate penetration. Mild residue.    Slightly thick: Not administered.    Mildly thick: Mild residue. Otherwise normal.    Moderately thick: Not administered.    Puree: Normal.    Semisolid: Not administered.    Regular: Not administered.    This study only includes the cervical esophagus.     SMITA CHAVEZ MD         SYSTEM ID:  [id number]   XR Chest Port 1 View    Narrative    EXAM: XR CHEST PORT 1 VIEW  LOCATION: [facility]  DATE/TIME: 10/14/2022 5:49 AM    INDICATION: f u PNA  COMPARISON: Chest radiograph 10/09/2022      Impression    IMPRESSION: Patchy opacity throughout the right hemithorax are similar compared to the prior allowing for differences in technique, findings again suspicious for pneumonia. Heart size is normal. Subtle opacities in the left lung are relatively unchanged.   Median sternotomy wires, unchanged.     *Note: Due to a large number of results and/or encounters for the requested time period, some results have not been displayed. A complete set of results can be found in Results Review.     Most Recent 3 CBC's:Recent Labs   Lab Test 10/13/22  0616 10/12/22  0521 10/11/22  0518   WBC 7.4 9.6 14.1*   HGB 8.2* 7.9* 7.5*   MCV 92 96 95    173 146*     Most Recent 3 BMP's:Recent Labs   Lab Test 10/17/22  0648 10/16/22  0555 10/15/22  1425    143 142   POTASSIUM 4.0 3.7 3.9   CHLORIDE 104 103 103   CO2 33* 34* 36*   BUN 18 15 17   CR 0.72 0.77 0.79   ANIONGAP 4 6 3   AMRITA 8.4* 8.5 8.8   * 109* 132*     Most Recent 2 LFT's:Recent Labs   Lab Test 10/10/22  0700 08/31/22  0829   AST 24 10   ALT 12 11   ALKPHOS 66 80   BILITOTAL 1.0 0.4

## 2022-10-17 NOTE — PLAN OF CARE
Goal Outcome Evaluation:      Plan of Care Reviewed With: patient, spouse    Overall Patient Progress: improvingOverall Patient Progress: improving    Outcome Evaluation: Discharge to home with home O2, home care for RN & PT.    A&Ox3-4, forgetful.  AVSS, O2 @ 2L/NC.  Tele SR with PAC's/PVC's.  Denies pain.  LS dim.  BS active, tolerating easy chew diet, appetite fair, no BM this shift.  Indwelling urinary catheter removed, voided spontaneously, PVR 140ml, pink in color, MD informed.  Okay to discharge w/o catheter.  Midline removed using sterile technique, tip of catheter intact, vaseline gauze dressing placed, secured with tegaderm.   Home care for RN & PT ordered.  Home O2 need order in place, 2 follow-up calls placed before a call back was received, will be delivered within 2 hours.  Discharge instructions and meds given to patient and spouse, with teach back.   Wheeled out to private car at 1528.

## 2022-10-17 NOTE — PROGRESS NOTES
Oxygen Documentation:   I certify that this patient, Hermilo Velasquez has been under my care (or a nurse practitioner or physician's assistant working with me). This is the face-to-face encounter for oxygen medical necessity.      Hermilo Velasquez is now in a chronic stable state and continues to require supplemental oxygen. Patient has continued oxygen desaturation due to Chronic Heart Failure I50  Chronic Respiratory Failure with Hypoxia J96.11.    Alternative treatment(s) tried or considered and deemed clinically infective for treatment of Chronic Heart Failure I50  Chronic Respiratory Failure with Hypoxia J96.11 include inhalers, pulmonary toileting, pulmonary rehab, and diuretics .  If portability is ordered, is the patient mobile within the home? yes      Patient has been assessed for Home Oxygen needs. Oxygen readings:10/16     *Pulse oximetry (SpO2) = 84% on room air at rest while awake.     *SpO2 improved to 94% on 2 liters/minute at rest.     *SpO2 = 82% on room air during activity/with exercise.     *SpO2 improved to 92% on 3 liters/minute during activity/with exercise.

## 2022-10-17 NOTE — PLAN OF CARE
Speech Language Therapy Discharge Summary    Reason for therapy discharge:    Discharged to home.    Progress towards therapy goal(s). See goals on Care Plan in [facility] electronic health record for goal details.  Goals partially met.  Barriers to achieving goals:   discharge from facility.    Therapy recommendation(s):    Consider SLP follow up for dysphagia tx if concerns arise - pt discharge on easy to chew/regular and thin diet and verbal/written education provided to pt/spouse re: swallow strategies on day of discharge.

## 2022-10-17 NOTE — PROGRESS NOTES
Antimicrobial Stewardship Team Note    Antimicrobial Stewardship Program - A joint venture between [facility] and [facility] [employer] to optimize antibiotic management.     Patient: Hermilo Velasquez  MRN: [id number]  Allergies: Omeprazole, Pantoprazole, Prevacid [lansoprazole], Lasix [furosemide], Lidocaine, Penicillin g, and Penicillins (rash but notes indicate tolerates cephalosporins)    Brief Summary: 89 year old male with chronic atrial fibrillation, history of PE, CKD 3, cognitive impairment, dysphagia, colonic AVMs, s/p TAVR for aortic stenosis who presents on 10/9/2022 due to decreased responsiveness.  He was recently hospitalized from 8/23 -9/8 due to acute GI bleed with hemoglobin of 5.8, thought to be due to colonic AVMs.  He was transfused 2 units of PRBCs, EGD did not show any bleeding and colonoscopy was deferred due to delirium.  Anticoagulation was discontinued.  While hospitalized he developed right-sided pneumonia thought to be due to aspiration.       Severe sepsis-present on admission-source right sided pneumonia versus aspiration pneumonia and possible  UTI    -With low blood pressure of systolic 80s, leukocytosis of 25, acute metabolic encephalopathy,  ARF patient meets criteria for severe sepsis on admission, patient is now awake, alert and oriented and breathing comfortably  -He responded very well to IV fluids-blood pressure now improve with that, renal failure resolved, eating orally now, stop IV fluids   -Lactate is normal.  CRP and procalcitonin elevated on admission, repeat procal improving but still elevated  -10/9 Chest x-ray shows worsening right-sided infiltrate.  Urine is mildly abnormal as well concerning for UTI (urine culture not growth)  -Started on IV cefepime and doxycycline on 10/9 after blood cultures were obtained in ED  - IV Cefepime stopped  on 10/11 as allergic to PCN (has tolerated cephalosporins in the past per notes) and patient started  on IV Meropenem to cover for aspiration as well       Active Anti-infective Medications   (From admission, onward)                 Start     Stop    10/11/22 1000  meropenem  1 g,   Intravenous,   EVERY 8 HOURS        Aspiration Pneumonia        --                  Assessment: Patient has completed a total of 10 days of broad spectrum antibiotics (combination of cefepime and meropenem).  Consider stopping meropenem as an appropriate treatment course has been completed.    Recommendations:  Medication Change: discontinue one or more antibiotic(s) -  meropenem  Consult Recommendation:     Recommended labs:     Other Recommendation(s): define duration of therapy -      Pharmacy took the following actions: Electronic note created, Sticky note reminder created.    Discussed with ID Staff: Dr. Aurelia Kapoor, PharmD, BCPS    Vital Signs/Clinical Features:  Vitals         10/15 0700  10/16 0659 10/16 0700  10/17 0659 10/17 0700  10/17 1233   Most Recent      Temp ( F) 97 -  98.3    97.4 -  99.8      98.4     98.4 (36.9) 10/17 0804    Pulse 48 -  77    44 -  81    65 -  90     65 10/17 1208    Resp 16 -  30    16 -  28      16     16 10/17 0804    /58 -  137/62    88/60 -  118/70    123/64 -  125/76     123/64 10/17 1208    SpO2 (%) 90 -  97    80 -  99       99 10/17 0000            Labs  Estimated Creatinine Clearance: 68 mL/min (based on SCr of 0.72 mg/dL).  Recent Labs   Lab Test 10/12/22  0521 10/13/22  0615 10/14/22  0510 10/15/22  1425 10/16/22  0555 10/17/22  0648   CR 0.78 0.74 0.75 0.79 0.77 0.72       Recent Labs   Lab Test 08/15/20  1450 08/16/20  0829 08/28/20  1145 09/10/20  1425 09/13/20  1256 09/13/20  1800 10/23/20  1617 10/24/20  0037 04/13/21  2038 04/14/21  0506 04/14/21  0604 09/02/22  1535 09/04/22  0848 09/08/22  0749 10/09/22  1353 10/10/22  0700 10/11/22  0518 10/12/22  0521 10/13/22  0616   WBC 9.2   < > 9.0  --  32.3*   < > 8.0   < > 24.6* 17.6*   < > 4.9  --   --  25.3* 22.1* 14.1* 9.6  7.4   ANEU 6.1  --  6.1  --  27.5*  --  5.5  --  21.9* 15.2*  --   --   --   --   --   --   --   --   --    ALYM 1.2  --  1.1  --  1.0  --  0.9  --  0.8 0.8  --   --   --   --   --   --   --   --   --    KODY 1.0  --  0.9  --  3.6*  --  1.0  --  1.7* 1.2  --   --   --   --   --   --   --   --   --    AEOS 0.8*  --  0.9*  --  0.3  --  0.7  --  0.0 0.2  --   --   --   --   --   --   --   --   --    HGB 7.4*   < > 8.5*   < > 8.4*   < > 6.6*   < > 7.0* 5.8*   < > 8.5*   < > 8.6* 8.5* 8.7* 7.5* 7.9* 8.2*   HCT 24.9*   < > 29.1*  --  27.9*   < > 22.5*   < > 25.3* 21.2*   < > 28.0*  --   --  28.6* 29.5* 24.7* 25.7* 26.5*   MCV 80   < > 87  --  91   < > 89   < > 81 82   < > 96  --   --  97 99 95 96 92      < > 248  --  223   < > 279   < > 290 244   < > 148*  --   --  194 165 146* 173 180    < > = values in this interval not displayed.       Recent Labs   Lab Test 04/10/22  2034 04/11/22  0744 08/23/22  1712 08/27/22  2245 08/31/22  0829 10/10/22  0700 10/11/22  0518 10/12/22  0521   BILITOTAL 0.5 0.3 0.2 0.6 0.4 1.0  --   --    ALKPHOS 91 78 112 97 80 66  --   --    ALBUMIN 2.6* 2.2* 2.6* 2.3* 2.1* 2.3* 2.1* 2.2*   AST 11 17 24 15 10 24  --   --    ALT 10 10 22 12 11 12  --   --        Recent Labs   Lab Test 02/21/18  1124 01/10/19  1731 05/13/19  1128 05/14/19  1021 05/15/19  0700 05/16/19  0641 12/22/19  1339 03/26/20  0812 08/15/20  1451 04/13/21  2038 04/13/21  2221 04/14/21  0506 04/10/22  2033 04/11/22  0744 07/26/22  1024 07/29/22  0809 08/27/22  1646 10/06/22  1526 10/09/22  1353 10/09/22  1410 10/10/22  2232 10/11/22  2047 10/12/22  0521 10/13/22  0615   PCAL  --   --   --   --   --   --    < > 11.41*  --  2.30  --  1.63  --  1.01*  --   --   --   --  7.49*  --   --   --   --  3.09*   LACT  --   --    < >  --   --   --    < >  --    < > 2.6*   < >  --    < >  --    < > 0.8 0.5*  --   --  1.0 1.3 1.1 0.7  --    CRP 12.0*  --   --  182.0* 184.0* 127.0*  --   --   --   --   --   --   --   --   --   --   --   11.40*  --   --   --   --   --   --    SED 32* 33*  --  68* 83* 89*  --   --   --   --   --   --   --   --   --   --   --  43*  --   --   --   --   --   --     < > = values in this interval not displayed.       Recent Labs   Lab Test 11/24/15  1036   VANCOMYCIN 19.8       Culture Results:  7-Day Micro Results       ** No results found for the last 168 hours. **            Recent Labs   Lab Test 08/16/20  0400 04/10/22  2236 05/08/22  1501 08/23/22  1322 10/09/22  1554   URINEPH 7.0 5.5 5.5 5.5 6.0   NITRITE Negative Negative Negative Negative Negative   LEUKEST Negative Negative Negative Trace* Small*   WBCU <1 13* 4 0-5 21*                         Imaging: XR Chest Port 1 View    Result Date: 10/14/2022  EXAM: XR CHEST PORT 1 VIEW LOCATION: [facility] DATE/TIME: 10/14/2022 5:49 AM INDICATION: f u PNA COMPARISON: Chest radiograph 10/09/2022     IMPRESSION: Patchy opacity throughout the right hemithorax are similar compared to the prior allowing for differences in technique, findings again suspicious for pneumonia. Heart size is normal. Subtle opacities in the left lung are relatively unchanged.  Median sternotomy wires, unchanged.    XR Video Swallow with SLP or OT    Result Date: 10/11/2022  VIDEO SWALLOWING EVALUATION   10/11/2022 9:57 AM HISTORY: Concern for silent aspiration. COMPARISON: None. FLUOROSCOPY TIME: 1.8 minutes Number of cine runs: 9 FINDINGS:  Thin: Moderate penetration. Mild residue. Slightly thick: Not administered. Mildly thick: Mild residue. Otherwise normal. Moderately thick: Not administered. Puree: Normal. Semisolid: Not administered. Regular: Not administered. This study only includes the cervical esophagus. SMITA CHAVEZ MD   SYSTEM ID:  [id number]

## 2022-10-17 NOTE — DISCHARGE INSTRUCTIONS
[facility] has accepted you for home care services of RN and Physical Therapy.  Their phone number is ([phone])

## 2022-10-17 NOTE — PLAN OF CARE
Goal Outcome Evaluation:       [id number]    Pt. A & O x3. Confused and forgetful at times. Vitals stable on 2L O2 via oxymask at night.  cooperative to take with scheduled meds. has occasional productive cough. Lung sounds coarse crackles, diminished in bases. Active BS, no BM, passing flatus. Irregular apical pulse. Tele: SR w/ PACs.  Suazo in place, with adequate output. A-1 with GB and walker. Easy to chew diet with thin liquids, no straws. Plan- D/C midday today.

## 2022-10-18 ENCOUNTER — ANCILLARY PROCEDURE (OUTPATIENT)
Dept: CT IMAGING | Facility: CLINIC | Age: 87
End: 2022-10-18
Attending: INTERNAL MEDICINE
Payer: COMMERCIAL

## 2022-10-18 DIAGNOSIS — C83.89: ICD-10-CM

## 2022-10-18 PROCEDURE — 71250 CT THORAX DX C-: CPT

## 2022-10-18 NOTE — PLAN OF CARE
Physical Therapy Discharge Summary    Reason for therapy discharge:    Discharged to home with home therapy.    Progress towards therapy goal(s). See goals on Care Plan in [facility] electronic health record for goal details.  Goals partially met.  Barriers to achieving goals:   discharge from facility.    Therapy recommendation(s):    Continued therapy is recommended.  Rationale/Recommendations:  home PT to further address deficits and optimize functional independence and safety in home environment.

## 2022-10-19 ENCOUNTER — MEDICAL CORRESPONDENCE (OUTPATIENT)
Dept: HEALTH INFORMATION MANAGEMENT | Facility: CLINIC | Age: 87
End: 2022-10-19

## 2022-10-19 ENCOUNTER — PATIENT OUTREACH (OUTPATIENT)
Dept: CARE COORDINATION | Facility: CLINIC | Age: 87
End: 2022-10-19

## 2022-10-19 ENCOUNTER — TELEPHONE (OUTPATIENT)
Dept: FAMILY MEDICINE | Facility: CLINIC | Age: 87
End: 2022-10-19

## 2022-10-19 NOTE — TELEPHONE ENCOUNTER
Verbal given for above requested homecare orders.  Homecare to send orders over for PCP signature.  Dione Mcgregor, RN  [facility] RN Triage Team

## 2022-10-19 NOTE — TELEPHONE ENCOUNTER
Teresita from [facility] wants PCP aware pt is not taking -Zyprexa, metoprolol and Breo. Pt has medications at home but pt is not taking them.States RN that did evaluation today just said pt is refusing to take them.    Pt's BP reading today was 110/52 HR 74 and oxygen level 95 % at RA.     CECILIA Lo is requesting a call back with any further directives from provider. Ok to have home care  continue monitoring BP for now?    Per chart review, pt has future appt with PCP 10/26/2022 for hospital follow up.

## 2022-10-19 NOTE — TELEPHONE ENCOUNTER
General Call      Reason for Call:   Orders for Skilled nurse and Physical therapy    What are your questions or concerns:     Skilled Nurse  1 x 1 week, 2 x 3 weeks, 1 x 3 weeks    Physical Therapy  2 x 6 weeks, 1 x 1 week    Patient declined OT and Speech therapy.     Date of last appointment with provider: 10/19/2022

## 2022-10-19 NOTE — PROGRESS NOTES
Clinic Care Coordination Contact  [facility]/Voicemail       Clinical Data: Care Coordinator Outreach  Outreach attempted x 2.  Left message on patient's voicemail with call back information and requested return call.  Plan:  Care Coordinator will do no further outreaches at this time.            DENG Tijerina  [phone]  [facility]

## 2022-10-21 ENCOUNTER — TRANSFERRED RECORDS (OUTPATIENT)
Dept: HEALTH INFORMATION MANAGEMENT | Facility: CLINIC | Age: 87
End: 2022-10-21

## 2022-10-24 ENCOUNTER — TELEPHONE (OUTPATIENT)
Dept: FAMILY MEDICINE | Facility: CLINIC | Age: 87
End: 2022-10-24

## 2022-10-24 NOTE — TELEPHONE ENCOUNTER
The Home Care/Assisted Living/Nursing Facility is calling regarding an established patient.  Has the patient seen Home Care in the past or is currently residing in Assisted Living or Nursing Facility? No.     Teresita calling from [facility] requesting the following orders that are NOT within the Home Care, Assisted Living or Nursing Home Eval and Treatment standing order and must be ordered by a Licensed Practitioner.    Preferred Call Back Number: [phone]    Requesting D/C of    [facility] called and is requesting approval to discharge patient from just skilled nurse. Patient is refusing skilled nursing and is only wanting therapy.    Routing to Licensed Practitioner (Provider) to review request and provide approval or recommendation.    Writer has verified Requestor will send fax to have orders signed.    Ruchi Mosher Triage Team

## 2022-10-24 NOTE — TELEPHONE ENCOUNTER
Call to Teresita from [facility]. Teresita informed of HEMAL Leahy's below response. Teresita verbalized understanding and states she will pass this information on to the patient.     Lidya Vela, RN BSN MSN  [facility]      Appointments in Next Year        Oct 26, 2022  4:00 PM  (Arrive by 3:40 PM)  Provider Visit with Karson Bishop MD  [facility] ([facility] ) [phone]

## 2022-10-25 ENCOUNTER — VIRTUAL VISIT (OUTPATIENT)
Dept: PHARMACY | Facility: CLINIC | Age: 87
End: 2022-10-25
Attending: INTERNAL MEDICINE
Payer: COMMERCIAL

## 2022-10-25 ENCOUNTER — MEDICAL CORRESPONDENCE (OUTPATIENT)
Dept: HEALTH INFORMATION MANAGEMENT | Facility: CLINIC | Age: 87
End: 2022-10-25

## 2022-10-25 DIAGNOSIS — K21.9 GASTROESOPHAGEAL REFLUX DISEASE WITHOUT ESOPHAGITIS: ICD-10-CM

## 2022-10-25 DIAGNOSIS — R52 PAIN: Primary | ICD-10-CM

## 2022-10-25 DIAGNOSIS — J44.9 CHRONIC OBSTRUCTIVE PULMONARY DISEASE, UNSPECIFIED COPD TYPE (H): ICD-10-CM

## 2022-10-25 DIAGNOSIS — N39.0 URINARY TRACT INFECTION WITHOUT HEMATURIA, SITE UNSPECIFIED: ICD-10-CM

## 2022-10-25 DIAGNOSIS — D50.0 IRON DEFICIENCY ANEMIA DUE TO CHRONIC BLOOD LOSS: ICD-10-CM

## 2022-10-25 DIAGNOSIS — I50.33 ACUTE ON CHRONIC DIASTOLIC CONGESTIVE HEART FAILURE (H): ICD-10-CM

## 2022-10-25 PROCEDURE — 99607 MTMS BY PHARM ADDL 15 MIN: CPT | Performed by: PHARMACIST

## 2022-10-25 PROCEDURE — 99605 MTMS BY PHARM NP 15 MIN: CPT | Performed by: PHARMACIST

## 2022-10-25 NOTE — TELEPHONE ENCOUNTER
Contact - Teresita [facility]    Attempt # 1    Was call answered?  No.  Left message on voicemail with information to call triage back. - number provided did not reach Teresita's confidential  - general home care line     On callback - please notify of provider's approval for requested orders below     Luis Mccabe RN  [facility]

## 2022-10-25 NOTE — Clinical Note
Kevin Bishop,  Curious to your thoughts on decreasing the frequency of the iron to help increase absorption. He is also finding the taste bothersome, so decreasing the frequency would benefit his medication experience as well.  I look forward to your response,  Valentin Cat, Pharm. D., [facility] Medication Therapy Management Pharmacist Direct Voicemail: [phone]

## 2022-10-25 NOTE — LETTER
"Recommended To-Do List      Prepared on: Oct 25, 2022       You can get the best results from your medications by completing the items on this \"To-Do List.\"      Bring your To-Do List when you go to your doctor. And, share it with your family or caregivers.    My To-Do List:  What we talked about: What I should do:   Your medication dosage being too high    I will message Dr. Bishop to recommend a lower dose of iron.          What we talked about: What I should do:   The importance of taking your medication as intended    Make sure to rinse your mouth out after using Breo          What we talked about: What I should do:                       "

## 2022-10-25 NOTE — PROGRESS NOTES
"Medication Therapy Management (MTM) Encounter    ASSESSMENT:                            Medication Adherence/Access: No issues identified    Pain: Stable.     COPD: He wasn't aware that he needed to rinse out his mouth. Discussed he should due so to prevent thrush.    CAD: Stable.     Heartburn: Stable.     Urination: Stable.     Anemia: Ferrous sulfate can have better absorption if taken less frequently. Could consider taking daily or every other day. Will message the provider especially since he finds the taste to be bothersome.     PLAN:                            1. Make sure to rinse your mouth out everytime after using the Breo to prevent thrush.   2. I will message Dr. Bishop about reducing the frequency of ferrous sulfate.    Follow-up: Return in about 3 weeks (around 11/15/2022) for Follow up, with me, using a phone visit.    SUBJECTIVE/OBJECTIVE:                          Hermilo Velasquez is a 89 year old male called for a transitions of care visit. He was discharged from [facility] on 10/17/22 for sepsis-due to \"worsening right-sided pneumonia and probable acute cystitis, and CHF with acute respiratory failure\".      Reason for visit: LEAH.    Allergies/ADRs: Reviewed in chart  Past Medical History: Reviewed in chart  Tobacco: He reports that he quit smoking about 24 years ago. He has a 110.00 pack-year smoking history. He has never used smokeless tobacco.  Alcohol: Less than 1 beverage / month      Post Hospital Changes:    Started: Bumex 0.5 mg, finasteride, tamsulosin    Changed: none    Stopped: Eliquis(previous gi bleed), alfuozisn      Medication Adherence/Access: Patient uses pill box(es).  Patient takes medications 2 time(s) per day.   Per patient, misses medication 0 times per week.     Pain: Currently taking acetaminophen 1000 mg three times a day as needed. Reports that he takes 500 mg tablets about 4 per day, \"but most of the time I don't.\" Reports no issues. Also takes gabapentin 200 mg at " "bedtime for neuropathy. Reports he notices that it works sometimes.     COPD: Current medications: ICS/LABA- Breo 1 puff(s) once daily  LAMA- Spiriva Handihaler 1 puff(s) once daily  Short-Acting Bronchodilator: Albuterol MDI, Nebs and pt reports using 1 times per week. sometimes rinses mouth out. .    Patient is not experiencing side effects.   Patient reports the following symptoms: none.    CAD: Currently taking atorvastatin 10 mg daily, bumex 0.5 mg daily, and metoprolol 25 mg twice a day (not taking). EF: 50-55% Has blood pressure taken, reports last time was 130 something over 60 something.     BP Readings from Last 3 Encounters:   10/17/22 123/64   09/15/22 106/62   09/14/22 106/62     Pulse Readings from Last 3 Encounters:   10/17/22 65   09/15/22 81   09/14/22 81     Heartburn: Currently taking famotidine 40 mg twice a day. Reports that heart burn is \"pretty good.\"    Urination: Currently taking finasteride 5 mg and tamsulosin 0.4 mg daily. Reports this has \"straightened out a little bit\" and is able to urinate effectively now. No concerns or questions at this time.     Anemia: Currently taking ferrous sulfate 325 mg twice a day. Reports that the tablet tastes bad. Reports no upset stomach.     Ferritin   Date Value Ref Range Status   04/14/2021 18 (L) 26 - 388 ng/mL Final     Iron   Date Value Ref Range Status   04/14/2021 6 (L) 35 - 180 ug/dL Final     Iron Binding Cap   Date Value Ref Range Status   04/14/2021 298 240 - 430 ug/dL Final         Hemoglobin   Date Value Ref Range Status   10/13/2022 8.2 (L) 13.3 - 17.7 g/dL Final   05/27/2021 11.6 (L) 13.3 - 17.7 g/dL Final   ]  ..    Last Comprehensive Metabolic Panel:  Sodium   Date Value Ref Range Status   10/17/2022 141 133 - 144 mmol/L Final   04/15/2021 140 133 - 144 mmol/L Final     Potassium   Date Value Ref Range Status   10/17/2022 4.0 3.4 - 5.3 mmol/L Final   04/15/2021 4.3 3.4 - 5.3 mmol/L Final     Chloride   Date Value Ref Range Status "   10/17/2022 104 94 - 109 mmol/L Final   04/15/2021 112 (H) 94 - 109 mmol/L Final     Chloride (External) (External)   Date Value Ref Range Status   05/19/2022 105 98 - 109 mmol/L Final     Carbon Dioxide   Date Value Ref Range Status   04/15/2021 26 20 - 32 mmol/L Final     Carbon Dioxide (CO2)   Date Value Ref Range Status   10/17/2022 33 (H) 20 - 32 mmol/L Final     Anion Gap   Date Value Ref Range Status   10/17/2022 4 3 - 14 mmol/L Final   04/15/2021 2 (L) 3 - 14 mmol/L Final     Glucose   Date Value Ref Range Status   10/17/2022 111 (H) 70 - 99 mg/dL Final   04/15/2021 94 70 - 99 mg/dL Final     Urea Nitrogen   Date Value Ref Range Status   10/17/2022 18 7 - 30 mg/dL Final   04/15/2021 19 7 - 30 mg/dL Final     Creatinine   Date Value Ref Range Status   10/17/2022 0.72 0.66 - 1.25 mg/dL Final   04/15/2021 0.98 0.66 - 1.25 mg/dL Final     GFR Estimate   Date Value Ref Range Status   10/17/2022 87 >60 mL/min/1.73m2 Final     Comment:     Effective December 21, 2021 eGFRcr in adults is calculated using the 2021 CKD-EPI creatinine equation which includes age and gender (Christiano casiano al., NEJ, DOI: 10.1056/KYNUhw5617472)   04/15/2021 68 >60 mL/min/[1.73_m2] Final     Comment:     Non  GFR Calc  Starting 12/18/2018, serum creatinine based estimated GFR (eGFR) will be   calculated using the Chronic Kidney Disease Epidemiology Collaboration   (CKD-EPI) equation.       Calcium   Date Value Ref Range Status   10/17/2022 8.4 (L) 8.5 - 10.1 mg/dL Final   04/15/2021 7.8 (L) 8.5 - 10.1 mg/dL Final         Today's Vitals: There were no vitals taken for this visit.  ----------------  Post Discharge Medication Reconciliation Status: discharge medications reconciled, continue medications without change.    I spent 27 minutes with this patient today. I offer these suggestions for consideration by Karson Bishop MD  . A copy of the visit note was provided to the patient's provider(s).    The patient was sent via  Syed a summary of these recommendations.     Valentin Cat, Pharm. D., BCACP  Medication Therapy Management Pharmacist  Direct Voicemail: [phone]      Telemedicine Visit Details  Type of service:  Telephone visit  Start Time: 1 pm  End Time: 1:27 PM  Originating Location (pt. Location): Home      Distant Location (provider location):  Off-site  Provider has received verbal consent for a visit from the patient? Yes     Medication Therapy Recommendations  Anemia, iron deficiency    Current Medication: ferrous sulfate (FE TABS) 325 (65 Fe) MG EC tablet   Rationale: Frequency inappropriate - Dosage too high - Safety   Recommendation: Decrease Frequency   Status: Contact Provider - Awaiting Response         Chronic obstructive pulmonary disease, unspecified COPD type (H)    Current Medication: fluticasone-vilanterol (BREO ELLIPTA) 200-25 MCG/INH inhaler   Rationale: Does not understand instructions - Adherence - Adherence   Recommendation: Provide Education   Status: Patient Agreed - Adherence/Education

## 2022-10-25 NOTE — LETTER
October 28, 2022  Hermilo MADRID Eva  [address]  [place] [id number]    Dear  Eva, [facility]     Thank you for talking with me on Oct 25, 2022 about your health and medications. As a follow-up to our conversation, I have included two documents:      1. Your Recommended To-Do List has steps you should take to get the best results from your medications.  2. Your Medication List will help you keep track of your medications and how to take them.    If you want to talk about these documents, please call Valentin Cat RPH at phone: [phone], Monday-Friday 8-4:30pm.    I look forward to working with you and your doctors to make sure your medications work well for you.    Sincerely,  Valentin Cat RPH  [facility] Pharmacist, [facility]

## 2022-10-25 NOTE — LETTER
_  Medication List        Prepared on: Oct 25, 2022     Bring your Medication List when you go to the doctor, hospital, or   emergency room. And, share it with your family or caregivers.     Note any changes to how you take your medications.  Cross out medications when you no longer use them.    Medication How I take it Why I use it Prescriber   acetaminophen (TYLENOL) 500 MG tablet Take 1,000 mg by mouth 3 times daily as needed Pain Over the Counter   albuterol (PROAIR HFA/PROVENTIL HFA/VENTOLIN HFA) 108 (90 Base) MCG/ACT inhaler Inhale 2 puffs into the lungs every 6 hours as needed Breathing Karson Bishop MD     albuterol (PROVENTIL) (2.5 MG/3ML) 0.083% neb solution Take 1 vial (2.5 mg) by nebulization every 6 hours as needed for shortness of breath / dyspnea or wheezing Breathing Karson Bishop MD   atorvastatin (LIPITOR) 10 MG tablet Take 1 tablet (10 mg) by mouth daily Cholesterol Karson Bishop MD   bumetanide (BUMEX) 0.5 MG tablet Take 1 tablet (0.5 mg) by mouth daily Chronic diastolic congestive heart failure (H) Aniket Bowden MD   famotidine (PEPCID) 40 MG tablet Take 40 mg by mouth 2 times daily Heart burn Over the Counter    ferrous sulfate (FE TABS) 325 (65 Fe) MG EC tablet Take 1 tablet (325 mg) by mouth 2 times daily Anemia Karson Bishop MD     finasteride (PROSCAR) 5 MG tablet Take 1 tablet (5 mg) by mouth daily Urinary Retention Aniket Bowden MD   fluticasone-vilanterol (BREO ELLIPTA) 200-25 MCG/INH inhaler Inhale 1 puff into the lungs daily Breathing Karson Bishop MD     gabapentin (NEURONTIN) 100 MG capsule Take 2 capsules (200 mg) by mouth At Bedtime Gastrointestinal hemorrhage, unspecified gastrointestinal hemorrhage type Lnoi Yuen MD   metoprolol tartrate (LOPRESSOR) 25 MG tablet Take 1 tablet (25 mg) by mouth 2 times daily Gastrointestinal hemorrhage, unspecified gastrointestinal hemorrhage type Loni Yuen MD   tamsulosin (FLOMAX) 0.4 MG capsule Take 1  capsule (0.4 mg) by mouth every evening Urinary Retention Aniket Bowden MD   tiotropium (SPIRIVA) 18 MCG inhaled capsule Inhale 18 mcg into the lungs daily Breathing Karson Bishop MD           Add new medications, over-the-counter drugs, herbals, vitamins, or  minerals in the blank rows below.    Medication How I take it Why I use it Prescriber                          Allergies:      omeprazole; pantoprazole; prevacid [lansoprazole]; lasix [furosemide]; lidocaine; penicillin g; penicillins        Side effects I have had:               Other Information:              My notes and questions:

## 2022-10-26 ENCOUNTER — VIRTUAL VISIT (OUTPATIENT)
Dept: FAMILY MEDICINE | Facility: CLINIC | Age: 87
End: 2022-10-26
Payer: COMMERCIAL

## 2022-10-26 DIAGNOSIS — J18.9 PNEUMONIA DUE TO INFECTIOUS ORGANISM, UNSPECIFIED LATERALITY, UNSPECIFIED PART OF LUNG: Primary | ICD-10-CM

## 2022-10-26 DIAGNOSIS — R39.12 WEAK URINE STREAM: ICD-10-CM

## 2022-10-26 PROCEDURE — 99442 PR PHYSICIAN TELEPHONE EVALUATION 11-20 MIN: CPT | Mod: 95 | Performed by: INTERNAL MEDICINE

## 2022-10-26 NOTE — TELEPHONE ENCOUNTER
"Patient Contact     Attempt #: 2     Was call answered?  No.  Writer believes this is an incorrect number.     Writer called [facility] at their main line  [phone], pressed option 3 to leave verbal orders and pressed 3 for \"Teresita\". No answer, writer unsure if this is the correct Teresita, Triage to recall.       Walker Ennis RN  [facility]       "

## 2022-10-26 NOTE — PROGRESS NOTES
Hermilo is a 89 year old who is being evaluated via a billable telephone visit.      What phone number would you like to be contacted at?   Home Phone [phone]   Work Phone Not on file.   Mobile [phone]     How would you like to obtain your AVS? MyChart    Assessment & Plan     Pneumonia due to infectious organism, unspecified laterality, unspecified part of lung  Weak urine stream  - UA Macro with Reflex to Micro and Culture - lab collect; Future      He seems to be doing better from his pneumonia  He should follow up with Dr. Dreyer to discuss Rituxan for his lymphoma   He should have a UA to make sure he does not have catheter associated UTI and he should restart flomax  I should see him face to face as soon as I can      31 minutes spent on the date of the encounter doing chart review, history and exam, documentation and further activities per the note     MED REC REQUIRED  Post Medication Reconciliation Status:       No follow-ups on file.    Karson Bishop MD  [facility] RENETTA Akhtar is a 89 year old, presenting for the following health issues:  Hospital F/U      [facility]       Hospital Follow-up Visit:    Hospital/Nursing Home/IP Rehab Facility: [facility]  Date of Admission: 10/9/2022  Date of Discharge: 10/17/2022  Reason(s) for Admission: Pneumonia of lower lobe due to infectious organism, unspecified laterality    Was your hospitalization related to COVID-19? No   Problems taking medications regularly:  None  Medication changes since discharge: None  Problems adhering to non-medication therapy:  None    Summary of hospitalization:  [facility] discharge summary reviewed  Diagnostic Tests/Treatments reviewed.  Follow up needed:   Follow up with primary care provider, Karson Bishop, within 7 days for hospital follow- up.  The following labs/tests are recommended: cbc, bmp .        Other Healthcare Providers Involved in Patient s  Care:         None  Update since discharge: improved.   Plan of care communicated with patient and family       Hospitalized for pneumonia   He feels better  He tells me never felt that bad  He denies cough or fever and reports completing his antibiotics  He had questions about rituxan  He thinks someone told him to stop taking flomax, but his stream is weak, he remembers having a Suazo cath in place in the hospital        Review of Systems         Objective           Vitals:  No vitals were obtained today due to virtual visit.    Physical Exam   He sounds comfortable and mental state seems at baseline.  He is terribly hard of hearing.                  Phone call duration: 19 minutes

## 2022-10-27 ENCOUNTER — MEDICAL CORRESPONDENCE (OUTPATIENT)
Dept: HEALTH INFORMATION MANAGEMENT | Facility: CLINIC | Age: 87
End: 2022-10-27

## 2022-10-27 NOTE — PATIENT INSTRUCTIONS
"Recommendations from today's MTM visit:                                                    MTM (medication therapy management) is a service provided by a clinical pharmacist designed to help you get the most of out of your medicines.   Today we reviewed what your medicines are for, how to know if they are working, that your medicines are safe and how to make your medicine regimen as easy as possible.      1. Make sure to rinse your mouth out everytime after using the Breo to prevent thrush.   2. I will message Dr. Bishop about reducing the frequency of ferrous sulfate.    Follow-up: No follow-ups on file.    It was great speaking with you today.  I value your experience and would be very thankful for your time in providing feedback in our clinic survey. In the next few days, you may receive an email or text message from [employer] with a link to a survey related to your  clinical pharmacist.\"     To schedule another MTM appointment, please call the clinic directly or you may call the MTM scheduling line at [phone] or toll-free at [phone].     My Clinical Pharmacist's contact information:                                                      Please feel free to contact me with any questions or concerns you have.      Valentin Cat, Pharm. D., BCACP  Medication Therapy Management Pharmacist  Direct Voicemail: [phone]     "

## 2022-10-31 NOTE — TELEPHONE ENCOUNTER
Contact     Attempt #: 3- Writer located Teresita's direct number per home care telephone encounter on 10/19/22:  [phone]     Verbal approval for orders below given      Walker Ennis RN  [facility]

## 2022-11-02 ENCOUNTER — MEDICAL CORRESPONDENCE (OUTPATIENT)
Dept: HEALTH INFORMATION MANAGEMENT | Facility: CLINIC | Age: 87
End: 2022-11-02

## 2022-11-03 ENCOUNTER — MEDICAL CORRESPONDENCE (OUTPATIENT)
Dept: HEALTH INFORMATION MANAGEMENT | Facility: CLINIC | Age: 87
End: 2022-11-03

## 2022-11-04 ENCOUNTER — MEDICAL CORRESPONDENCE (OUTPATIENT)
Dept: HEALTH INFORMATION MANAGEMENT | Facility: CLINIC | Age: 87
End: 2022-11-04

## 2022-11-06 ENCOUNTER — HOSPITAL ENCOUNTER (INPATIENT)
Facility: CLINIC | Age: 87
LOS: 6 days | Discharge: HOME HEALTH | DRG: 871 | End: 2022-11-12
Attending: EMERGENCY MEDICINE | Admitting: INTERNAL MEDICINE
Payer: COMMERCIAL

## 2022-11-06 ENCOUNTER — APPOINTMENT (OUTPATIENT)
Dept: ULTRASOUND IMAGING | Facility: CLINIC | Age: 87
DRG: 871 | End: 2022-11-06
Attending: EMERGENCY MEDICINE
Payer: COMMERCIAL

## 2022-11-06 ENCOUNTER — APPOINTMENT (OUTPATIENT)
Dept: GENERAL RADIOLOGY | Facility: CLINIC | Age: 87
DRG: 871 | End: 2022-11-06
Attending: EMERGENCY MEDICINE
Payer: COMMERCIAL

## 2022-11-06 DIAGNOSIS — R41.82 ALTERED MENTAL STATUS, UNSPECIFIED ALTERED MENTAL STATUS TYPE: ICD-10-CM

## 2022-11-06 DIAGNOSIS — A41.9 SEVERE SEPSIS (H): ICD-10-CM

## 2022-11-06 DIAGNOSIS — R65.20 SEVERE SEPSIS (H): ICD-10-CM

## 2022-11-06 DIAGNOSIS — J18.9 PNEUMONIA OF LOWER LOBE DUE TO INFECTIOUS ORGANISM, UNSPECIFIED LATERALITY: Primary | ICD-10-CM

## 2022-11-06 DIAGNOSIS — R09.02 HYPOXIA: ICD-10-CM

## 2022-11-06 DIAGNOSIS — N17.9 ACUTE KIDNEY INJURY (H): ICD-10-CM

## 2022-11-06 LAB
ALBUMIN SERPL-MCNC: 2.5 G/DL (ref 3.4–5)
ALBUMIN UR-MCNC: NEGATIVE MG/DL
ALP SERPL-CCNC: 87 U/L (ref 40–150)
ALT SERPL W P-5'-P-CCNC: 10 U/L (ref 0–70)
AMMONIA PLAS-SCNC: 17 UMOL/L (ref 10–50)
ANION GAP SERPL CALCULATED.3IONS-SCNC: 5 MMOL/L (ref 3–14)
APPEARANCE UR: CLEAR
AST SERPL W P-5'-P-CCNC: 16 U/L (ref 0–45)
ATRIAL RATE - MUSE: 119 BPM
BASE EXCESS BLDA CALC-SCNC: -1.2 MMOL/L (ref -9–1.8)
BASOPHILS # BLD AUTO: 0 10E3/UL (ref 0–0.2)
BASOPHILS NFR BLD AUTO: 0 %
BILIRUB SERPL-MCNC: 0.4 MG/DL (ref 0.2–1.3)
BILIRUB UR QL STRIP: NEGATIVE
BUN SERPL-MCNC: 29 MG/DL (ref 7–30)
CALCIUM SERPL-MCNC: 8 MG/DL (ref 8.5–10.1)
CHLORIDE BLD-SCNC: 111 MMOL/L (ref 94–109)
CO2 SERPL-SCNC: 26 MMOL/L (ref 20–32)
COLOR UR AUTO: NORMAL
CREAT BLD-MCNC: 1.9 MG/DL (ref 0.7–1.3)
CREAT SERPL-MCNC: 1.77 MG/DL (ref 0.66–1.25)
DIASTOLIC BLOOD PRESSURE - MUSE: NORMAL MMHG
EOSINOPHIL # BLD AUTO: 0 10E3/UL (ref 0–0.7)
EOSINOPHIL NFR BLD AUTO: 0 %
ERYTHROCYTE [DISTWIDTH] IN BLOOD BY AUTOMATED COUNT: 15.4 % (ref 10–15)
FLUAV RNA SPEC QL NAA+PROBE: NEGATIVE
FLUBV RNA RESP QL NAA+PROBE: NEGATIVE
GFR SERPL CREATININE-BSD FRML MDRD: 33 ML/MIN/1.73M2
GFR SERPL CREATININE-BSD FRML MDRD: 36 ML/MIN/1.73M2
GLUCOSE BLD-MCNC: 185 MG/DL (ref 70–99)
GLUCOSE UR STRIP-MCNC: NEGATIVE MG/DL
HCO3 BLD-SCNC: 27 MMOL/L (ref 21–28)
HCO3 BLDV-SCNC: 19 MMOL/L (ref 21–28)
HCO3 BLDV-SCNC: 29 MMOL/L (ref 21–28)
HCT VFR BLD AUTO: 34.7 % (ref 40–53)
HGB BLD-MCNC: 10.4 G/DL (ref 13.3–17.7)
HGB UR QL STRIP: NEGATIVE
HOLD SPECIMEN: NORMAL
HOLD SPECIMEN: NORMAL
IMM GRANULOCYTES # BLD: 0 10E3/UL
IMM GRANULOCYTES NFR BLD: 0 %
INTERPRETATION ECG - MUSE: NORMAL
KETONES UR STRIP-MCNC: NEGATIVE MG/DL
LACTATE BLD-SCNC: 1.1 MMOL/L
LACTATE BLD-SCNC: 2.3 MMOL/L
LEUKOCYTE ESTERASE UR QL STRIP: NEGATIVE
LYMPHOCYTES # BLD AUTO: 0.4 10E3/UL (ref 0.8–5.3)
LYMPHOCYTES NFR BLD AUTO: 3 %
MAGNESIUM SERPL-MCNC: 2.1 MG/DL (ref 1.6–2.3)
MCH RBC QN AUTO: 28.4 PG (ref 26.5–33)
MCHC RBC AUTO-ENTMCNC: 30 G/DL (ref 31.5–36.5)
MCV RBC AUTO: 95 FL (ref 78–100)
MONOCYTES # BLD AUTO: 0.9 10E3/UL (ref 0–1.3)
MONOCYTES NFR BLD AUTO: 7 %
NEUTROPHILS # BLD AUTO: 11.2 10E3/UL (ref 1.6–8.3)
NEUTROPHILS NFR BLD AUTO: 90 %
NITRATE UR QL: NEGATIVE
NRBC # BLD AUTO: 0 10E3/UL
NRBC BLD AUTO-RTO: 0 /100
NT-PROBNP SERPL-MCNC: 5267 PG/ML (ref 0–1800)
O2/TOTAL GAS SETTING VFR VENT: 15 %
OXYHGB MFR BLD: 61 % (ref 92–100)
P AXIS - MUSE: 89 DEGREES
PCO2 BLD: 65 MM HG (ref 35–45)
PCO2 BLDV: 47 MM HG (ref 40–50)
PCO2 BLDV: 66 MM HG (ref 40–50)
PH BLD: 7.23 [PH] (ref 7.35–7.45)
PH BLDV: 7.21 [PH] (ref 7.32–7.43)
PH BLDV: 7.25 [PH] (ref 7.32–7.43)
PH UR STRIP: 5 [PH] (ref 5–7)
PLATELET # BLD AUTO: 172 10E3/UL (ref 150–450)
PO2 BLD: 40 MM HG (ref 80–105)
PO2 BLDV: 33 MM HG (ref 25–47)
PO2 BLDV: 40 MM HG (ref 25–47)
POTASSIUM BLD-SCNC: 4.8 MMOL/L (ref 3.4–5.3)
PR INTERVAL - MUSE: 148 MS
PROCALCITONIN SERPL-MCNC: 1.23 NG/ML
PROCALCITONIN SERPL-MCNC: 1.43 NG/ML
PROT SERPL-MCNC: 6.5 G/DL (ref 6.8–8.8)
QRS DURATION - MUSE: 136 MS
QT - MUSE: 344 MS
QTC - MUSE: 483 MS
R AXIS - MUSE: -22 DEGREES
RBC # BLD AUTO: 3.66 10E6/UL (ref 4.4–5.9)
RBC URINE: <1 /HPF
RSV RNA SPEC NAA+PROBE: NEGATIVE
SAO2 % BLDV: 50 % (ref 94–100)
SAO2 % BLDV: 65 % (ref 94–100)
SARS-COV-2 RNA RESP QL NAA+PROBE: NEGATIVE
SODIUM SERPL-SCNC: 142 MMOL/L (ref 133–144)
SP GR UR STRIP: 1.01 (ref 1–1.03)
SYSTOLIC BLOOD PRESSURE - MUSE: NORMAL MMHG
T AXIS - MUSE: 34 DEGREES
T4 FREE SERPL-MCNC: 0.97 NG/DL (ref 0.76–1.46)
TROPONIN I SERPL HS-MCNC: 79 NG/L
TSH SERPL DL<=0.005 MIU/L-ACNC: 4.36 MU/L (ref 0.4–4)
UROBILINOGEN UR STRIP-MCNC: NORMAL MG/DL
VENTRICULAR RATE- MUSE: 119 BPM
WBC # BLD AUTO: 12.6 10E3/UL (ref 4–11)
WBC URINE: 1 /HPF

## 2022-11-06 PROCEDURE — 120N000001 HC R&B MED SURG/OB

## 2022-11-06 PROCEDURE — 87637 SARSCOV2&INF A&B&RSV AMP PRB: CPT | Performed by: EMERGENCY MEDICINE

## 2022-11-06 PROCEDURE — 258N000003 HC RX IP 258 OP 636: Performed by: EMERGENCY MEDICINE

## 2022-11-06 PROCEDURE — 250N000011 HC RX IP 250 OP 636: Performed by: INTERNAL MEDICINE

## 2022-11-06 PROCEDURE — 96365 THER/PROPH/DIAG IV INF INIT: CPT

## 2022-11-06 PROCEDURE — 84145 PROCALCITONIN (PCT): CPT | Performed by: INTERNAL MEDICINE

## 2022-11-06 PROCEDURE — 84439 ASSAY OF FREE THYROXINE: CPT | Performed by: EMERGENCY MEDICINE

## 2022-11-06 PROCEDURE — 250N000011 HC RX IP 250 OP 636: Performed by: EMERGENCY MEDICINE

## 2022-11-06 PROCEDURE — 71045 X-RAY EXAM CHEST 1 VIEW: CPT

## 2022-11-06 PROCEDURE — 82040 ASSAY OF SERUM ALBUMIN: CPT | Performed by: EMERGENCY MEDICINE

## 2022-11-06 PROCEDURE — 82565 ASSAY OF CREATININE: CPT

## 2022-11-06 PROCEDURE — 84145 PROCALCITONIN (PCT): CPT | Performed by: EMERGENCY MEDICINE

## 2022-11-06 PROCEDURE — 80053 COMPREHEN METABOLIC PANEL: CPT | Performed by: EMERGENCY MEDICINE

## 2022-11-06 PROCEDURE — 83880 ASSAY OF NATRIURETIC PEPTIDE: CPT | Performed by: EMERGENCY MEDICINE

## 2022-11-06 PROCEDURE — 83735 ASSAY OF MAGNESIUM: CPT | Performed by: EMERGENCY MEDICINE

## 2022-11-06 PROCEDURE — 85025 COMPLETE CBC W/AUTO DIFF WBC: CPT | Performed by: EMERGENCY MEDICINE

## 2022-11-06 PROCEDURE — 96368 THER/DIAG CONCURRENT INF: CPT

## 2022-11-06 PROCEDURE — 81001 URINALYSIS AUTO W/SCOPE: CPT | Performed by: EMERGENCY MEDICINE

## 2022-11-06 PROCEDURE — 84443 ASSAY THYROID STIM HORMONE: CPT | Performed by: EMERGENCY MEDICINE

## 2022-11-06 PROCEDURE — 82805 BLOOD GASES W/O2 SATURATION: CPT | Performed by: EMERGENCY MEDICINE

## 2022-11-06 PROCEDURE — 36415 COLL VENOUS BLD VENIPUNCTURE: CPT | Performed by: EMERGENCY MEDICINE

## 2022-11-06 PROCEDURE — 93970 EXTREMITY STUDY: CPT

## 2022-11-06 PROCEDURE — 82140 ASSAY OF AMMONIA: CPT | Performed by: EMERGENCY MEDICINE

## 2022-11-06 PROCEDURE — 258N000003 HC RX IP 258 OP 636: Performed by: INTERNAL MEDICINE

## 2022-11-06 PROCEDURE — 87040 BLOOD CULTURE FOR BACTERIA: CPT | Performed by: EMERGENCY MEDICINE

## 2022-11-06 PROCEDURE — 84484 ASSAY OF TROPONIN QUANT: CPT | Performed by: EMERGENCY MEDICINE

## 2022-11-06 PROCEDURE — C9803 HOPD COVID-19 SPEC COLLECT: HCPCS

## 2022-11-06 PROCEDURE — 999N000157 HC STATISTIC RCP TIME EA 10 MIN

## 2022-11-06 PROCEDURE — 83605 ASSAY OF LACTIC ACID: CPT

## 2022-11-06 PROCEDURE — 99223 1ST HOSP IP/OBS HIGH 75: CPT | Mod: AI | Performed by: INTERNAL MEDICINE

## 2022-11-06 PROCEDURE — 99285 EMERGENCY DEPT VISIT HI MDM: CPT | Mod: 25

## 2022-11-06 RX ORDER — SODIUM CHLORIDE 9 MG/ML
INJECTION, SOLUTION INTRAVENOUS CONTINUOUS
Status: DISCONTINUED | OUTPATIENT
Start: 2022-11-06 | End: 2022-11-07

## 2022-11-06 RX ORDER — ACETAMINOPHEN 325 MG/1
650 TABLET ORAL EVERY 6 HOURS PRN
Status: DISCONTINUED | OUTPATIENT
Start: 2022-11-06 | End: 2022-11-12 | Stop reason: HOSPADM

## 2022-11-06 RX ORDER — AMOXICILLIN 250 MG
2 CAPSULE ORAL 2 TIMES DAILY PRN
Status: DISCONTINUED | OUTPATIENT
Start: 2022-11-06 | End: 2022-11-12 | Stop reason: HOSPADM

## 2022-11-06 RX ORDER — DOXYCYCLINE 100 MG/10ML
100 INJECTION, POWDER, LYOPHILIZED, FOR SOLUTION INTRAVENOUS ONCE
Status: COMPLETED | OUTPATIENT
Start: 2022-11-06 | End: 2022-11-06

## 2022-11-06 RX ORDER — PROCHLORPERAZINE MALEATE 5 MG
5 TABLET ORAL EVERY 6 HOURS PRN
Status: DISCONTINUED | OUTPATIENT
Start: 2022-11-06 | End: 2022-11-12 | Stop reason: HOSPADM

## 2022-11-06 RX ORDER — PROCHLORPERAZINE 25 MG
12.5 SUPPOSITORY, RECTAL RECTAL EVERY 12 HOURS PRN
Status: DISCONTINUED | OUTPATIENT
Start: 2022-11-06 | End: 2022-11-12 | Stop reason: HOSPADM

## 2022-11-06 RX ORDER — DOXYCYCLINE 100 MG/10ML
100 INJECTION, POWDER, LYOPHILIZED, FOR SOLUTION INTRAVENOUS EVERY 12 HOURS
Status: DISCONTINUED | OUTPATIENT
Start: 2022-11-06 | End: 2022-11-12

## 2022-11-06 RX ORDER — AMOXICILLIN 250 MG
1 CAPSULE ORAL 2 TIMES DAILY PRN
Status: DISCONTINUED | OUTPATIENT
Start: 2022-11-06 | End: 2022-11-12 | Stop reason: HOSPADM

## 2022-11-06 RX ORDER — POLYETHYLENE GLYCOL 3350 17 G/17G
17 POWDER, FOR SOLUTION ORAL DAILY PRN
Status: DISCONTINUED | OUTPATIENT
Start: 2022-11-06 | End: 2022-11-12 | Stop reason: HOSPADM

## 2022-11-06 RX ORDER — ALBUTEROL SULFATE 90 UG/1
2 AEROSOL, METERED RESPIRATORY (INHALATION) EVERY 6 HOURS PRN
Status: DISCONTINUED | OUTPATIENT
Start: 2022-11-06 | End: 2022-11-12 | Stop reason: HOSPADM

## 2022-11-06 RX ORDER — NITROGLYCERIN 0.4 MG/1
0.4 TABLET SUBLINGUAL EVERY 5 MIN PRN
Status: DISCONTINUED | OUTPATIENT
Start: 2022-11-06 | End: 2022-11-08

## 2022-11-06 RX ORDER — SODIUM CHLORIDE 9 MG/ML
INJECTION, SOLUTION INTRAVENOUS CONTINUOUS
Status: DISCONTINUED | OUTPATIENT
Start: 2022-11-06 | End: 2022-11-06

## 2022-11-06 RX ORDER — ONDANSETRON 4 MG/1
4 TABLET, ORALLY DISINTEGRATING ORAL EVERY 6 HOURS PRN
Status: DISCONTINUED | OUTPATIENT
Start: 2022-11-06 | End: 2022-11-12 | Stop reason: HOSPADM

## 2022-11-06 RX ORDER — ONDANSETRON 2 MG/ML
4 INJECTION INTRAMUSCULAR; INTRAVENOUS EVERY 6 HOURS PRN
Status: DISCONTINUED | OUTPATIENT
Start: 2022-11-06 | End: 2022-11-12 | Stop reason: HOSPADM

## 2022-11-06 RX ORDER — ALBUTEROL SULFATE 0.83 MG/ML
2.5 SOLUTION RESPIRATORY (INHALATION) EVERY 6 HOURS PRN
Status: DISCONTINUED | OUTPATIENT
Start: 2022-11-06 | End: 2022-11-12 | Stop reason: HOSPADM

## 2022-11-06 RX ORDER — ACETAMINOPHEN 650 MG/1
650 SUPPOSITORY RECTAL EVERY 6 HOURS PRN
Status: DISCONTINUED | OUTPATIENT
Start: 2022-11-06 | End: 2022-11-12 | Stop reason: HOSPADM

## 2022-11-06 RX ADMIN — SODIUM CHLORIDE 1000 ML: 9 INJECTION, SOLUTION INTRAVENOUS at 10:13

## 2022-11-06 RX ADMIN — SODIUM CHLORIDE: 9 INJECTION, SOLUTION INTRAVENOUS at 16:17

## 2022-11-06 RX ADMIN — DOXYCYCLINE 100 MG: 100 INJECTION, POWDER, LYOPHILIZED, FOR SOLUTION INTRAVENOUS at 22:35

## 2022-11-06 RX ADMIN — CEFEPIME HYDROCHLORIDE 2 G: 2 INJECTION, POWDER, FOR SOLUTION INTRAVENOUS at 10:15

## 2022-11-06 RX ADMIN — DOXYCYCLINE 100 MG: 100 INJECTION, POWDER, LYOPHILIZED, FOR SOLUTION INTRAVENOUS at 10:37

## 2022-11-06 RX ADMIN — SODIUM CHLORIDE 1000 ML: 9 INJECTION, SOLUTION INTRAVENOUS at 10:48

## 2022-11-06 ASSESSMENT — ACTIVITIES OF DAILY LIVING (ADL)
ADLS_ACUITY_SCORE: 35
ADLS_ACUITY_SCORE: 41
ADLS_ACUITY_SCORE: 41
ADLS_ACUITY_SCORE: 35

## 2022-11-06 NOTE — PROVIDER NOTIFICATION
Paged Dr Trinh, Pt was brought to floor with rodriguez in place, OK to continue? if yes, can you please put in a rodriguez order.   Also, pt does not have a diet order. NPO? Ice chips/sips ok. He's sleepy right now so not sure about his swallow.  Awaiting reply  Ordered; Continue rodriguez, soft bite sized, mod thick liq

## 2022-11-06 NOTE — PHARMACY-ADMISSION MEDICATION HISTORY
Pharmacy Medication History  Admission medication history interview status for the 11/6/2022 admission is complete. See EPIC admission navigator for prior to admission medications     Location of Interview: Phone  Medication history sources: Patient's family/friend (Spouse Roberta)    Significant changes made to the medication list:      In the past week, patient estimated taking medication this percent of the time: greater than 90%    Additional medication history information:   Med list updated 10/25 by [facility] pharmacist.  Spouse reports no changes since that visit and that patient adherent to regimen    Medication reconciliation completed by provider prior to medication history? Yes    Time spent in this activity: 15 minutes    Prior to Admission medications    Medication Sig Last Dose Taking? Auth Provider Long Term End Date   acetaminophen (TYLENOL) 500 MG tablet Take 1,000 mg by mouth 3 times daily as needed  at PRN Yes Reported, Patient     albuterol (PROAIR HFA/PROVENTIL HFA/VENTOLIN HFA) 108 (90 Base) MCG/ACT inhaler Inhale 2 puffs into the lungs every 6 hours as needed  at PRN Yes Reported, Patient Yes    albuterol (PROVENTIL) (2.5 MG/3ML) 0.083% neb solution Take 1 vial (2.5 mg) by nebulization every 6 hours as needed for shortness of breath / dyspnea or wheezing  at PRN Yes Karson Bishop MD Yes    atorvastatin (LIPITOR) 10 MG tablet Take 1 tablet (10 mg) by mouth daily 11/5/2022 Yes Karson Bishop MD Yes    bumetanide (BUMEX) 0.5 MG tablet Take 1 tablet (0.5 mg) by mouth daily 11/5/2022 Yes Aniket Bowden MD Yes    famotidine (PEPCID) 40 MG tablet Take 40 mg by mouth 2 times daily 11/5/2022 Yes Unknown, Entered By History     ferrous sulfate (FE TABS) 325 (65 Fe) MG EC tablet Take 1 tablet (325 mg) by mouth 2 times daily 11/5/2022 Yes Reported, Patient     finasteride (PROSCAR) 5 MG tablet Take 1 tablet (5 mg) by mouth daily 11/5/2022 Yes Aniket Bowden MD     fluticasone-vilanterol (BREO  ELLIPTA) 200-25 MCG/INH inhaler Inhale 1 puff into the lungs daily 11/5/2022 Yes Unknown, Entered By History     gabapentin (NEURONTIN) 100 MG capsule Take 2 capsules (200 mg) by mouth At Bedtime 11/5/2022 Yes Loni Yuen MD Yes    metoprolol tartrate (LOPRESSOR) 25 MG tablet Take 1 tablet (25 mg) by mouth 2 times daily 11/5/2022 Yes Loni Yuen MD Yes    tamsulosin (FLOMAX) 0.4 MG capsule Take 1 capsule (0.4 mg) by mouth every evening 11/5/2022 Yes Aniket Bowden MD     tiotropium (SPIRIVA) 18 MCG inhaled capsule Inhale 18 mcg into the lungs daily 11/5/2022 Yes Unknown, Entered By History No        The information provided in this note is only as accurate as the sources available at the time of update(s)

## 2022-11-06 NOTE — H&P
INTERNAL MEDICINE H&P    H&P dictated:  #[id number]    Leland Trinh Jr., MD  [phone] (p)  Text Page  [place]

## 2022-11-06 NOTE — ED NOTES
Bed: ST02  Expected date:   Expected time:   Means of arrival:   Comments:  419  89 M uncon/sats 45%  [id number]

## 2022-11-06 NOTE — H&P
Admitted: 11/06/2022    PRIMARY CARE PROVIDER:  Dr. Karson Bishop.    CHIEF COMPLAINT:  Unresponsiveness with hypoxia.    HISTORY OF PRESENT ILLNESS:  Mr. Hermilo Velasquez is a 89-year-old male patient with history noted below including COPD on chronic oxygen nocturnally; CHF (HFpEF); paroxysmal atrial fibrillation; aortic stenosis, status post TAVR; chronic kidney disease stage III; BPH; chronic dysphagia; history of PE and DVT; history of GI bleed felt to be related to colonic AVMs (08/2022); history of lung cancer status post right lower lobe resection (2019); and a recent hospitalization at [facility] (10/09/2022-10/17/2022) with issues including sepsis due to pneumonia with acute hypoxic respiratory failure.  He presents with the above issues.  Please note that the patient is unable to provide any history at this time given impaired mentation.      The patient typically uses oxygen when he is sleeping.  However, apparently he has issues with oxygen falling off at times  Today, he was found unresponsive and with his oxygen off.  Paramedics were called and he had oxygen level of 41% initially.  He was subsequently brought to [facility] for further evaluation.    The patient was seen in the ER by Dr. Castano.  On initial evaluation, temperature 97.9, heart rate 120, blood pressure 66/40, respiratory rate 20, oxygen saturations were in the 80s on room air.  He was placed on oxygen up to 10 liters.  Laboratory evaluation showed white count 12.6, hemoglobin 10.4, platelets 172; BMP showed notable for a creatinine 1.77 (of note, creatinine was normal on 10/17/2022) NTP-BNP 5267; lactate 2.3, procalcitonin 1.23; TSH 4.36, but free T4 was normal at 0.97; ABG showed a pH of 7.23, pCO2 of 65, pO2 is 40; COVID, influenza  RSV testing were negative.  Chest x-ray that showed previously seen infiltrates nearly resolved with no definite new infiltrates.      In the ER, he was given IV fluids with some improvement of blood  pressure into the 80s and 90s.  He was given broad antibiotics consisting of cefepime and doxycycline and he has been ordered vancomycin.  Given his issues, request for admission was made.    The patient is obtunded, not responding to questions.  He barely opens his eyes to loud voice and sternal rub.    PAST MEDICAL HISTORY:   1.  COPD.  He uses oxygen at night.  2.  CHF.  Echocardiogram from 08/2020 showed left ventricular EF of 50% to 55%; right ventricle appeared moderate to severely enlarged in certain views and function could not be assessed due to poor image quality; prosthetic aortic valve was not well visualized.  3.  Aortic stenosis, status post TAVR 2015.  4.  Atrial fibrillation.  Previously on apixaban, but stopped around August/September 2022 given GI bleeding.    5.  Dyslipidemia.  6.  MGUS.  7.  BPH.  Status post TURP.  History and issues with urinary retention last hospitalization, which resolved.  8.  Right bundle branch block.  9.  History of DVT and PE.  10.  History of lung cancer, status post right lower lobe resection 2019.  11.  History of non-Hodgkin's lymphoma treated, in remission.    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 Vasques      Aortic stenosis     Severe AS, 9/2015 AVR - [facility] TRIFECTA Bovine bioprosthesis 25MM TF-25A     Atrial fibrillation (H)     9/2015 Paroxysmal post op Afib - discharged on Warfarin and a beta blocker     COPD (chronic obstructive pulmonary disease) (H)      Deep vein thrombosis (H)      GERD (gastroesophageal reflux disease)      Heart murmur      Monoclonal gammopathy     plasmacyte prominent causing monoclonal gammopathy     Need for SBE (subacute bacterial endocarditis) prophylaxis      Neuropathy      Other and unspecified hyperlipidemia      Other malignant lymphomas     non hodgkin's lymphoma     RBBB (right bundle branch block)      Severe sepsis with acute organ  dysfunction (H) 11/16/2015     Unspecified hereditary and idiopathic peripheral neuropathy        PAST SURGICAL HISTORY:    Past Surgical History:   Procedure Laterality Date     APPENDECTOMY       ARTHROPLASTY KNEE Right 7/25/2022    Procedure: RIGHT TOTAL KNEE ARTHROPLASTY;  Surgeon: Giuliano Deshpande MD;  Location:  OR     AS TOTAL KNEE ARTHROPLASTY       BACK SURGERY       ESOPHAGOSCOPY, GASTROSCOPY, DUODENOSCOPY (EGD), COMBINED N/A 11/28/2015    Procedure: COMBINED ESOPHAGOSCOPY, GASTROSCOPY, DUODENOSCOPY (EGD);  Surgeon: Danis Castillo MD;  Location:  GI     ESOPHAGOSCOPY, GASTROSCOPY, DUODENOSCOPY (EGD), COMBINED N/A 7/26/2018    Procedure: COMBINED ESOPHAGOSCOPY, GASTROSCOPY, DUODENOSCOPY (EGD);;  Surgeon: Toby Dong DO;  Location:  GI     ESOPHAGOSCOPY, GASTROSCOPY, DUODENOSCOPY (EGD), COMBINED N/A 8/17/2020    Procedure: ESOPHAGOGASTRODUODENOSCOPY (EGD);  Surgeon: Herrera Henry MD;  Location:  GI     ESOPHAGOSCOPY, GASTROSCOPY, DUODENOSCOPY (EGD), COMBINED N/A 10/24/2020    Procedure: ESOPHAGOGASTRODUODENOSCOPY (EGD);  Surgeon: Vick Florentino MD;  Location:  GI     ESOPHAGOSCOPY, GASTROSCOPY, DUODENOSCOPY (EGD), COMBINED N/A 4/11/2022    Procedure: ESOPHAGOGASTRODUODENOSCOPY (EGD);  Surgeon: Vick Florentino MD;  Location:  GI     ESOPHAGOSCOPY, GASTROSCOPY, DUODENOSCOPY (EGD), COMBINED N/A 8/26/2022    Procedure: ESOPHAGOGASTRODUODENOSCOPY (EGD);  Surgeon: El Mcgovern MD;  Location:  GI     HERNIA REPAIR  2006     HERNIORRHAPHY VENTRAL  4/17/2013    Procedure: HERNIORRHAPHY VENTRAL;  VENTRAL HERNIA REPAIR WITH MESH;  Surgeon: Patel Guzman MD;  Location:  OR     KNEE SURGERY      arthroscopic right knee surgery      LOBECTOMY LUNG Right 3/26/2019    POSSIBLE LOBECTOMY LUNG per Dr. Vasques at [facility]     REPAIR LIGAMENT ANKLE  2/23/2012    Procedure:REPAIR LIGAMENT ANKLE; LEFT TARSAL TUNNEL RELEASE OF KNOT OF ARIEL RELEASE; Surgeon:KWAME  SAUL GARZA; Location:[facility] OR     REPLACE VALVE AORTIC N/A 9/3/2015    Procedure: REPLACE VALVE AORTIC;  Surgeon: Antonino Mitchell MD;  Location: [facility] OR     ROTATOR CUFF REPAIR RT/LT      bilateral     SPINE SURGERY      3 spine surgeries     THORACOTOMY, WEDGE RESECTION LUNG, COMBINED Right 3/26/2019    RIGHT THORACOTOMY, WEDGE RESECTION, RIGHT LOWER LOBE LUNG NODULE,;  Surgeon: Jose A Vasques MD;  Location: [facility] OR     TONSILLECTOMY       TURP         ALLERGIES:  Omeprazole, Pantoprazole, Prevacid [lansoprazole], Lasix [furosemide], Lidocaine, Penicillin g, and Penicillins      HOME MEDICATIONS:    Prior to Admission Medications   Prescriptions Last Dose Informant Patient Reported? Taking?   acetaminophen (TYLENOL) 500 MG tablet  Self Yes No   Sig: Take 1,000 mg by mouth 3 times daily as needed   albuterol (PROAIR HFA/PROVENTIL HFA/VENTOLIN HFA) 108 (90 Base) MCG/ACT inhaler  Self Yes No   Sig: Inhale 2 puffs into the lungs every 6 hours as needed   albuterol (PROVENTIL) (2.5 MG/3ML) 0.083% neb solution  Self No No   Sig: Take 1 vial (2.5 mg) by nebulization every 6 hours as needed for shortness of breath / dyspnea or wheezing   atorvastatin (LIPITOR) 10 MG tablet  Self No No   Sig: Take 1 tablet (10 mg) by mouth daily   bumetanide (BUMEX) 0.5 MG tablet   No No   Sig: Take 1 tablet (0.5 mg) by mouth daily   famotidine (PEPCID) 40 MG tablet  Self Yes No   Sig: Take 40 mg by mouth 2 times daily   ferrous sulfate (FE TABS) 325 (65 Fe) MG EC tablet  Self Yes No   Sig: Take 1 tablet (325 mg) by mouth 2 times daily   finasteride (PROSCAR) 5 MG tablet   No No   Sig: Take 1 tablet (5 mg) by mouth daily   fluticasone-vilanterol (BREO ELLIPTA) 200-25 MCG/INH inhaler   Yes No   Sig: Inhale 1 puff into the lungs daily   gabapentin (NEURONTIN) 100 MG capsule   No No   Sig: Take 2 capsules (200 mg) by mouth At Bedtime   metoprolol tartrate (LOPRESSOR) 25 MG tablet   No No   Sig: Take 1 tablet (25 mg) by mouth 2  times daily   tamsulosin (FLOMAX) 0.4 MG capsule   No No   Sig: Take 1 capsule (0.4 mg) by mouth every evening   tiotropium (SPIRIVA) 18 MCG inhaled capsule  Self Yes No   Sig: Inhale 18 mcg into the lungs daily      Facility-Administered Medications: None       SOCIAL HISTORY:  The patient is .  He has a son.  He smoked 2 packs per day for 55 years, quit in 1998.  Does not drink alcohol.    FAMILY HISTORY:  Reviewed and noncontributory.    REVIEW OF SYSTEMS:  As noted in the HPI, otherwise not able to obtain from the patient.      PHYSICAL EXAMINATION:    VITAL SIGNS:  Temperature 97.9, heart rate 116, blood pressure 85/50, respiration rate 20, O2 saturation 98% on 10 liters oxygen.  GENERAL:  A 89-year-old male patient lying in bed, frail appearing.  He is obtunded, not conversant.  HEENT:  Pupils equal, round, and reactive.  No scleral icterus or conjunctival injection.  Oropharynx -- no gross erythema.  NECK:  No bruits, JVD, adenopathy.  HEART:  Tachycardic, regular, +1 systolic murmur.  No gallops or rubs.  LUNGS:  Diminished at bases.  No obvious crackles or wheezes.  The patient not taking deep breaths.  ABDOMEN:  Soft, no obvious tenderness.  Positive bowel sounds.  No femoral bruits.  EXTREMITIES:  Show trace edema.  NEUROLOGIC:  The patient is obtunded and not able to follow any commands.    LABORATORY AND IMAGING:  Results for orders placed or performed during the hospital encounter of 11/06/22   XR Chest Port 1 View     Status: None    Narrative    EXAM: XR CHEST PORTABLE 1 VIEW  LOCATION: [facility]  DATE/TIME: 11/06/2022, 10:25 AM    INDICATION: Shortness of breath.  COMPARISON: 10/09/2022.      Impression    IMPRESSION: Previously seen infiltrates nearly resolved. No definite new infiltrates.     Comprehensive metabolic panel     Status: Abnormal   Result Value Ref Range    Sodium 142 133 - 144 mmol/L    Potassium 4.8 3.4 - 5.3 mmol/L    Chloride 111 (H) 94 - 109  mmol/L    Carbon Dioxide (CO2) 26 20 - 32 mmol/L    Anion Gap 5 3 - 14 mmol/L    Urea Nitrogen 29 7 - 30 mg/dL    Creatinine 1.77 (H) 0.66 - 1.25 mg/dL    Calcium 8.0 (L) 8.5 - 10.1 mg/dL    Glucose 185 (H) 70 - 99 mg/dL    Alkaline Phosphatase 87 40 - 150 U/L    AST 16 0 - 45 U/L    ALT 10 0 - 70 U/L    Protein Total 6.5 (L) 6.8 - 8.8 g/dL    Albumin 2.5 (L) 3.4 - 5.0 g/dL    Bilirubin Total 0.4 0.2 - 1.3 mg/dL    GFR Estimate 36 (L) >60 mL/min/1.73m2   Troponin I     Status: Abnormal   Result Value Ref Range    Troponin I High Sensitivity 79 (H) <79 ng/L   Ammonia     Status: Normal   Result Value Ref Range    Ammonia 17 10 - 50 umol/L   Magnesium     Status: Normal   Result Value Ref Range    Magnesium 2.1 1.6 - 2.3 mg/dL   TSH with free T4 reflex     Status: Abnormal   Result Value Ref Range    TSH 4.36 (H) 0.40 - 4.00 mU/L   Nt probnp inpatient (BNP)     Status: Abnormal   Result Value Ref Range    N terminal Pro BNP Inpatient 5,267 (H) 0 - 1,800 pg/mL   UA with Microscopic reflex to Culture     Status: Normal    Specimen: Urine, Suazo Catheter   Result Value Ref Range    Color Urine Light Yellow Colorless, Straw, Light Yellow, Yellow    Appearance Urine Clear Clear    Glucose Urine Negative Negative mg/dL    Bilirubin Urine Negative Negative    Ketones Urine Negative Negative mg/dL    Specific Gravity Urine 1.012 1.003 - 1.035    Blood Urine Negative Negative    pH Urine 5.0 5.0 - 7.0    Protein Albumin Urine Negative Negative mg/dL    Urobilinogen Urine Normal Normal, 2.0 mg/dL    Nitrite Urine Negative Negative    Leukocyte Esterase Urine Negative Negative    RBC Urine <1 <=2 /HPF    WBC Urine 1 <=5 /HPF    Narrative    Urine Culture not indicated   Procalcitonin     Status: Abnormal   Result Value Ref Range    Procalcitonin 1.23 (H) <0.05 ng/mL   iStat Gases (lactate) venous, POCT     Status: Abnormal   Result Value Ref Range    Lactic Acid POCT 2.3 (H) <=2.0 mmol/L    Bicarbonate Venous POCT 29 (H) 21 - 28  mmol/L    O2 Sat, Venous POCT 65 (L) 94 - 100 %    pCO2V Venous POCT 66 (H) 40 - 50 mm Hg    pH Venous POCT 7.25 (L) 7.32 - 7.43    pO2 Venous POCT 40 25 - 47 mm Hg   CBC with platelets and differential     Status: Abnormal   Result Value Ref Range    WBC Count 12.6 (H) 4.0 - 11.0 10e3/uL    RBC Count 3.66 (L) 4.40 - 5.90 10e6/uL    Hemoglobin 10.4 (L) 13.3 - 17.7 g/dL    Hematocrit 34.7 (L) 40.0 - 53.0 %    MCV 95 78 - 100 fL    MCH 28.4 26.5 - 33.0 pg    MCHC 30.0 (L) 31.5 - 36.5 g/dL    RDW 15.4 (H) 10.0 - 15.0 %    Platelet Count 172 150 - 450 10e3/uL    % Neutrophils 90 %    % Lymphocytes 3 %    % Monocytes 7 %    % Eosinophils 0 %    % Basophils 0 %    % Immature Granulocytes 0 %    NRBCs per 100 WBC 0 <1 /100    Absolute Neutrophils 11.2 (H) 1.6 - 8.3 10e3/uL    Absolute Lymphocytes 0.4 (L) 0.8 - 5.3 10e3/uL    Absolute Monocytes 0.9 0.0 - 1.3 10e3/uL    Absolute Eosinophils 0.0 0.0 - 0.7 10e3/uL    Absolute Basophils 0.0 0.0 - 0.2 10e3/uL    Absolute Immature Granulocytes 0.0 <=0.4 10e3/uL    Absolute NRBCs 0.0 10e3/uL   Symptomatic; Unknown Influenza A/B & SARS-CoV2 (COVID-19) Virus PCR Multiplex Nasopharyngeal     Status: Normal    Specimen: Nasopharyngeal; Swab   Result Value Ref Range    Influenza A PCR Negative Negative    Influenza B PCR Negative Negative    RSV PCR Negative Negative    SARS CoV2 PCR Negative Negative    Narrative    Testing was performed using the Xpert Xpress CoV2/Flu/RSV Assay on the [employer] Instrument. This test should be ordered for the detection of SARS-CoV-2 and influenza viruses in individuals who meet clinical and/or epidemiological criteria. Test performance is unknown in asymptomatic patients. This test is for in vitro diagnostic use under the FDA EUA for laboratories certified under CLIA to perform high or moderate complexity testing. This test has not been FDA cleared or approved. A negative result does not rule out the presence of PCR inhibitors in the specimen  or target RNA in concentration below the limit of detection for the assay. If only one viral target is positive but coinfection with multiple targets is suspected, the sample should be re-tested with another FDA cleared, approved, or authorized test, if coinfection would change clinical management. This test was validated by the [facility] [employer]. These laboratories are certified under the Clinical Laboratory Improvement Amendments of 1988 (CLIA-88) as qualified to perform high complexity laboratory testing.   [place] Draw     Status: None    Narrative    The following orders were created for panel order [place] Draw.  Procedure                               Abnormality         Status                     ---------                               -----------         ------                     Extra Blue Top Tube[[id number]]                              Final result               Extra Red Top Tube[[id number]]                               Final result               Extra Green Top (Lithium...[[id number]]                                                   Please view results for these tests on the individual orders.   Extra Blue Top Tube     Status: None   Result Value Ref Range    Hold Specimen JIC    Extra Red Top Tube     Status: None   Result Value Ref Range    Hold Specimen JIC    Blood gas arterial and oxyhgb     Status: Abnormal   Result Value Ref Range    pH Arterial 7.23 (L) 7.35 - 7.45    pCO2 Arterial 65 (H) 35 - 45 mm Hg    pO2 Arterial 40 (L) 80 - 105 mm Hg    Bicarbonate Arterial 27 21 - 28 mmol/L    Oxyhemoglobin Arterial 61 (L) 92 - 100 %    Base Excess/Deficit (+/-) -1.2 -9.0 - 1.8 mmol/L    FIO2 15    Creatinine POCT     Status: Abnormal   Result Value Ref Range    Creatinine POCT 1.9 (H) 0.7 - 1.3 mg/dL    GFR, ESTIMATED POCT 33 (L) >60 mL/min/1.73m2   T4 free     Status: Normal   Result Value Ref Range    Free T4 0.97 0.76 - 1.46 ng/dL   Procalcitonin     Status: Abnormal   Result Value Ref  Range    Procalcitonin 1.43 (H) <0.05 ng/mL   iStat Gases (lactate) venous, POCT     Status: Abnormal   Result Value Ref Range    Lactic Acid POCT 1.1 <=2.0 mmol/L    Bicarbonate Venous POCT 19 (L) 21 - 28 mmol/L    O2 Sat, Venous POCT 50 (L) 94 - 100 %    pCO2V Venous POCT 47 40 - 50 mm Hg    pH Venous POCT 7.21 (L) 7.32 - 7.43    pO2 Venous POCT 33 25 - 47 mm Hg   CBC with platelets differential     Status: Abnormal    Narrative    The following orders were created for panel order CBC with platelets differential.  Procedure                               Abnormality         Status                     ---------                               -----------         ------                     CBC with platelets and d...[[id number]]  Abnormal            Final result                 Please view results for these tests on the individual orders.         ASSESSMENT AND PLAN:   Mr. Hermilo Velasquez is a 89-year-old male patient with history including COPD on chronic oxygen nocturnally; CHF (HFpEF); paroxysmal atrial fibrillation; aortic stenosis, status post TAVR; chronic kidney disease stage III; BPH; chronic dysphagia; history of PE and DVT; history of GI bleed felt to be related to colonic AVMs (08/2022); history of lung cancer status post right lower lobe resection (2019); and a recent hospitalization at [facility] (10/09/2022-10/17/2022) with issues including sepsis due to pneumonia with acute hypoxic respiratory failure.  He presents with the above issues presents after being found unresponsive with his oxygen off and noted to be hypotensive with acute hypoxic respiratory failure.    On initial evaluation, temperature 97.9, heart rate 120, blood pressure 66/40, respiratory rate 20, oxygen saturations were in the 80s on room air.  He was placed on oxygen up to 10 liters.  Laboratory evaluation showed white count 12.6, hemoglobin 10.4, platelets 172; BMP showed notable for a creatinine 1.77 (of note, creatinine was normal on  10/17/2022) NTP-BNP 5267; lactate 2.3, procalcitonin 1.23; TSH 4.36, but free T4 was normal at 0.97; ABG showed a pH of 7.23, pCO2 of 65, pO2 is 40; COVID, influenza  RSV testing were negative.  Chest x-ray that showed previously seen infiltrates nearly resolved with no definite new infiltrates.        Acute hypoxic respiratory failure with hypotension, unclear etiology, question septic shock, possible respiratory source (aspiration pneumonia not visualized on CXR), other causes not ruled out.    * Initial presentation as above.  He has been given IV fluids and broad antibiotics including cefepime and doxycycline.   - I discussed case with Dr. Castano.  Prefer ICU bed at this time, but we may not have bed available.  Consider IMC if his blood pressures improve in the ER with volume.  - A stat echocardiogram and ultrasound and lower extremity ultrasound ordered to evaluate for signs of possible PE; if signs of PE/DVT, would cautiously anticoagulate with heparin drip given hemodynamic instability and given that GI bleed was multiple months ago.  - Otherwise, we will treat for septic shock with fluids and broad antibiotics, which will consist of doxycycline and cefepime.   - Continue oxygen.  - Continue IVF's.  - Follow up on UA and cultures.    Altered mental status, suspect due to metabolic and infectious/septic encephalopathy.  - Treat other issues as noted.  - Re-orient as needed.  - Maintain normal day/night, sleep/wake cycles.  - Minimize sedating medications as able.    Acute kidney injury on CKD3, suspect prerenal.  * Cr 1.77 on admit. Previously normal 10/2022.  - Continue IVF's with NS.  - Hold PTA bumetanide for now.  - Monitor BMP.  - Avoid nephrotoxic medications.    Chronic dysphagia.  * Concern for aspiration pneumonia as above.  - SLP evaluation.  - Dysphagia diet with thickened liquids for now if encephalopathy improves.    COPD.    - Continue fluticasone-vilanterol, tiotropium and p.r.n.  albuterol.    Congestive heart failure.  Aortic stenosis, status post TAVR.   * Echocardiogram from 08/2020 showed left ventricular EF of 50% to 55%; right ventricle appeared moderate to severely enlarged in certain views and function could not be assessed due to poor image quality; prosthetic aortic valve was not well visualized.  - Hold PTA bumetanide for now.    - Hold PTA metoprolol as well.    - Monitor I's and O's, daily weights.    Dyslipidemia.   - Continue atorvastatin as able.    Paroxysmal atrial fibrillation.    * The patient has been off apixaban since GI bleed in August 2022.  - Hold prior to admission metoprolol for now.      Chronic anemia.    * Hgb stable at 10.4 on admit.  - Continue iron supplement as able.  - Monitor CBC.  - Consider prbc transfusion if hgb </= 7.0 or if significant bleeding with hemodynamic instability or if symptomatic.    GERD.    - Continue famotidine as able.    BPH.    - Hold prior to admission finasteride and tamsulosin for now.    H/o GI bleed in 08/2022, possibly due to colonic AVM's.  * Seen by Chadd BLAKE at that time. Required blood transfusion. EGD during the hospitalization was negative; colonoscopy deferred due to agitated delirium and clinical stability.  - Monitor clinically for now.  - If needed (if found with evidence of acute DVT/PE), then may need to start anticoagulation cautiously.    Prophylaxis.  - Pneumoboots and ambulation.    CODE STATUS.  Of note is that he has had a palliative care consult in the past and he has previously desired restorative care and full code.  He is full code at this time.      Leland Trinh Jr., MD      ADDENDUM:  Blood pressures improved to low 100's.  - Plan admit to IMC.  - If continues to improve in the ED over the next few hours, also could consider admission to medical telemetry (rather than IMC) as well.    Leland Trinh Jr., MD  [phone] (p)  Text Page  [place]        D: 11/06/2022   T: 11/06/2022   MT: GRIFFIN    Name:      CLYDE RODRIGUEZ  MRN:      [id number]        Account:     [id number]   :      1932           Admitted:    2022       Document: [id number]    cc:  Karson Bishop MD

## 2022-11-06 NOTE — ED PROVIDER NOTES
History   Chief Complaint:  Hypoxia        HPI   Hermilo Velasquez is a 89 year old male on eliquis with history of aortic valve stenosis, anemia, atrial fibrillation, COPD, lung cancer, sepsis, CKD, hypertension, and CHF who presents with hypoxia and altered mental status from home via EMS. Patient sleeps with a nasal cannula and has had it fall off multiple times which causes him to become hypoxic and unresponsive. Patient was found today unresponsive with oxygen saturations of 41 percent. He was initially unresponsive but improved while with EMS and responds to questions here in the ED. however he is not able to provide any coherent history.    Review of Systems   Unable to perform ROS: Mental status change       Allergies:  Omeprazole  Pantoprazole  Prevacid [Lansoprazole]  Lasix [Furosemide]  Lidocaine  Penicillin G  Penicillins     Medications:  Albuterol inhaler  Alfuzosin ER  Eliquis   Atorvastatin  Dimenhydrinate  Famotidine   Breo ellipta   Gabapentin  Metoprolol succinate ER  Miralax   Senna  Bactrim DS     Past Medical History:      Non-rheumatic aortic valve stenosis  GERD  Non-Hodgkin's lymphoma  Anemia  Paroxysmal atrial fibrillation  Pulmonary nodules  Lobar pneumonia  Essential hypertension  Hyperlipidemia  COPD  Spongiotic dermatitis  Malignant neoplasm of lung  Bullous pemphigoid  Centrilobular emphysema  Heart murmur, systolic  Aortic stenosis  Sepsis  DVT  Peripheral neuropathy  Acute kidney injury  CKD stage 3  Gastrointestinal hemorrhage  Pneumonia  PE  CHF  Hemarthrosis  Drusen of macula, bilateral  Lumbar post-laminectomy syndrome     Past Surgical History:    Appendectomy  Right knee arthroplasty  Back surgery  Ventral hernia repair  Lung lobectomy  Repair ankle ligament  Replace aortic valve  Right wedge resection lung, thoracotomy  Tonsillectomy  TURP      Family History:    Father - CAD, emphysema    Social History:  The patient presents to the ED alone via EMS  Living Situation:  independently with wife  PCP: Karson Bishop     Physical Exam     Patient Vitals for the past 24 hrs:   BP Temp Temp src Pulse Resp SpO2   11/06/22 1300 104/68 -- -- 120 15 99 %   11/06/22 1245 98/70 -- -- 117 14 --   11/06/22 1230 90/61 -- -- 118 13 95 %   11/06/22 1215 90/57 -- -- 117 16 93 %   11/06/22 1148 (!) 85/58 -- -- 116 19 93 %   11/06/22 1130 (!) 83/54 -- -- 116 14 94 %   11/06/22 1115 (!) 87/57 -- -- 116 18 93 %   11/06/22 1100 92/57 97.9  F (36.6  C) Temporal 115 17 95 %   11/06/22 1045 (!) 89/53 -- -- 114 16 95 %   11/06/22 1030 (!) 79/42 -- -- 114 18 96 %   11/06/22 1020 (!) 72/43 -- -- 115 29 90 %   11/06/22 1015 (!) 71/43 -- -- 115 9 100 %   11/06/22 1010 (!) 71/43 -- -- 116 23 100 %   11/06/22 1005 (!) 71/43 -- -- 118 25 (!) 86 %   11/06/22 1000 (!) 66/42 -- -- 120 21 93 %       Physical Exam  Nursing note and vitals reviewed.  Constitutional:  Minimally responsive but does open eyes to verbal stimuli.  Facemask in place.  HENT:   Nose:    Nose normal.   Mouth/Throat:   Mucous membranes are normal.   Eyes:    Conjunctivae normal and EOM are normal.      Pupils are equal, round, and reactive to light.   Neck:    Trachea normal.   Cardiovascular:  Tachycardic rate, regular rhythm, normal heart sounds and normal pulses. No murmur heard.  Pulmonary/Chest:  Slightly tachypneic with some faint rales in the right base.   Abdominal:   Soft. Normal appearance and bowel sounds are normal.      There is no tenderness.      There is no rebound and no CVA tenderness.   Musculoskeletal:  Extremities atraumatic x 4.   Lymphadenopathy:  No cervical adenopathy.   Neurological:   Awake and minimally responsive. Normal strength.      No cranial nerve deficit or sensory deficit. GCS eye subscore is 4. GCS verbal subscore is 5. GCS motor subscore is 6.   Skin:    Skin is intact. No rash noted.   Psychiatric:   Minimally responsive.    Emergency Department Course   ECG  ECG taken at 0959, ECG read at 1000  Atrial  fibrillation with rapid ventricular rate   Right bundle branch block   Abnormal ECG   No significant change as compared to prior, dated 10/12/22.  Rate 119 bpm. DE interval 148 ms. QRS duration 136 ms. QT/QTc 344/483 ms. P-R-T axes 89 -2 34.       Imaging:  US Lower Extremity Venous Duplex Bilateral   Final Result   IMPRESSION:   1.  No deep venous thrombosis in the bilateral lower extremities.      XR Chest Port 1 View   Final Result   IMPRESSION: Previously seen infiltrates nearly resolved. No definite new infiltrates.         Echocardiogram Complete    (Results Pending)     Report per radiology    Laboratory:  Labs Ordered and Resulted from Time of ED Arrival to Time of ED Departure   COMPREHENSIVE METABOLIC PANEL - Abnormal       Result Value    Sodium 142      Potassium 4.8      Chloride 111 (*)     Carbon Dioxide (CO2) 26      Anion Gap 5      Urea Nitrogen 29      Creatinine 1.77 (*)     Calcium 8.0 (*)     Glucose 185 (*)     Alkaline Phosphatase 87      AST 16      ALT 10      Protein Total 6.5 (*)     Albumin 2.5 (*)     Bilirubin Total 0.4      GFR Estimate 36 (*)    TROPONIN I - Abnormal    Troponin I High Sensitivity 79 (*)    TSH WITH FREE T4 REFLEX - Abnormal    TSH 4.36 (*)    NT PROBNP INPATIENT - Abnormal    N terminal Pro BNP Inpatient 5,267 (*)    PROCALCITONIN - Abnormal    Procalcitonin 1.23 (*)    ISTAT GASES LACTATE VENOUS POCT - Abnormal    Lactic Acid POCT 2.3 (*)     Bicarbonate Venous POCT 29 (*)     O2 Sat, Venous POCT 65 (*)     pCO2V Venous POCT 66 (*)     pH Venous POCT 7.25 (*)     pO2 Venous POCT 40     CBC WITH PLATELETS AND DIFFERENTIAL - Abnormal    WBC Count 12.6 (*)     RBC Count 3.66 (*)     Hemoglobin 10.4 (*)     Hematocrit 34.7 (*)     MCV 95      MCH 28.4      MCHC 30.0 (*)     RDW 15.4 (*)     Platelet Count 172      % Neutrophils 90      % Lymphocytes 3      % Monocytes 7      % Eosinophils 0      % Basophils 0      % Immature Granulocytes 0      NRBCs per 100 WBC 0       Absolute Neutrophils 11.2 (*)     Absolute Lymphocytes 0.4 (*)     Absolute Monocytes 0.9      Absolute Eosinophils 0.0      Absolute Basophils 0.0      Absolute Immature Granulocytes 0.0      Absolute NRBCs 0.0     BLOOD GAS ARTERIAL WITH OXYHEMOGLOBIN - Abnormal    pH Arterial 7.23 (*)     pCO2 Arterial 65 (*)     pO2 Arterial 40 (*)     Bicarbonate Arterial 27      Oxyhemoglobin Arterial 61 (*)     Base Excess/Deficit (+/-) -1.2      FIO2 15     ISTAT CREATININE POCT - Abnormal    Creatinine POCT 1.9 (*)     GFR, ESTIMATED POCT 33 (*)    PROCALCITONIN - Abnormal    Procalcitonin 1.43 (*)    ISTAT GASES LACTATE VENOUS POCT - Abnormal    Lactic Acid POCT 1.1      Bicarbonate Venous POCT 19 (*)     O2 Sat, Venous POCT 50 (*)     pCO2V Venous POCT 47      pH Venous POCT 7.21 (*)     pO2 Venous POCT 33     AMMONIA - Normal    Ammonia 17     MAGNESIUM - Normal    Magnesium 2.1     ROUTINE UA WITH MICROSCOPIC REFLEX TO CULTURE - Normal    Color Urine Light Yellow      Appearance Urine Clear      Glucose Urine Negative      Bilirubin Urine Negative      Ketones Urine Negative      Specific Gravity Urine 1.012      Blood Urine Negative      pH Urine 5.0      Protein Albumin Urine Negative      Urobilinogen Urine Normal      Nitrite Urine Negative      Leukocyte Esterase Urine Negative      RBC Urine <1      WBC Urine 1     INFLUENZA A/B & SARS-COV2 PCR MULTIPLEX - Normal    Influenza A PCR Negative      Influenza B PCR Negative      RSV PCR Negative      SARS CoV2 PCR Negative     T4 FREE - Normal    Free T4 0.97     BLOOD GAS ARTERIAL   LACTIC ACID WHOLE BLOOD   ISTAT CREATININE POCT   BLOOD CULTURE   BLOOD CULTURE        Emergency Department Course:  Reviewed:  I reviewed nursing notes, vitals, past medical history and Care Everywhere    Assessments:  0982 I obtained history and examined the patient as noted above.     Consults:  2795 I spoke with Dr. Trinh of the Hospitalist service from [facility] regarding  patient's presentation, findings, and plan of care.     Interventions:  1013 Sodium chloride bolus, 1000 ml, IV   1015 Cefepime, 2 g, IV  1037 Vibramycin, 100 mg, IV  1048 Sodium chloride bolus, 1000 ml, IV     Disposition:  The patient was admitted to the hospital under the care of Dr. Trinh.     Impression & Plan   CMS Diagnoses:   The patient has signs of Severe Sepsis     If one the following conditions is present, a 30 mL/kg bolus is recommended as part of the 6 hour bundle (IBW can be used for BMI >30, or document refusal/contraindication):      1.   Initial hypotension  defined as 2 bps < 90 or map < 65 in the 6hrs before or 3hrs after time zero.     2.  Lactate >4.      The patient has signs of Severe Sepsis as evidenced by:    1. 2 SIRS criteria, AND  2. Suspected infection, AND   3. Organ dysfunction: Lactic Acidosis with value >2.0    Time severe sepsis diagnosis confirmed: 1015  11/06/22 as this was the time when SBP <90 or MAP <65    3 Hour Severe Sepsis Bundle Completion:    1. Initial Lactic Acid Result:   Recent Labs   Lab Test 11/06/22  1238 11/06/22  1001 10/12/22  0521   LACT 1.1 2.3* 0.7     2. Blood Cultures before Antibiotics: Yes  3. Broad Spectrum Antibiotics Administered:  yes       Anti-infectives (From admission through now)    Start     Dose/Rate Route Frequency Ordered Stop    11/06/22 1030  doxycycline (VIBRAMYCIN) 100 mg vial to attach to  mL bag         100 mg  over 1-2 Hours Intravenous ONCE 11/06/22 1030 11/06/22 1137    11/06/22 1015  ceFEPIme (MAXIPIME) 2 g vial to attach to  ml bag for ADULTS or 50 ml bag for PEDS         2 g  over 30 Minutes Intravenous ONCE 11/06/22 1012 11/06/22 1040          4. Is initial hypotension present?     Yes. (Definition - 2 SBPs <90, MAP <65, or decrease > 40 from baseline due to infection w/in 3 hrs of each other during the time period of 6 hrs before and 3 hrs after time zero)   Full 30 mL/kg bolus given (see amount below).    BMI  Readings from Last 1 Encounters:   10/16/22 23.16 kg/m      30 mL/kg fluids based on weight: 2,070 mL  30 mL/kg fluids based on IBW (must be >= 60 inches tall): 2,050 mL                      Severe Sepsis reassessment:  1. Repeat Lactic Acid Level within 6 hours of time zero: 1.1  2. MAP>65 after initial IVF bolus, will continue to monitor fluid status and vital signs    I attest to having performed a repeat sepsis exam and assessment of perfusion at 1230 and the results demonstrate improved perfusion.    Medical Decision Making:  This is a 89-year-old male brought in by EMS for altered mental status and decreased oxygen saturation levels.  Upon arrival here, he was quite hypotensive, and I was concerned he might have aspirated and be septic.  I also considered the possibility of dehydration and acute kidney injury or possibly a UTI.  His initial creatinine came back quite elevated, which led me to believe that he was likely quite dehydrated.  Therefore he was provided IV fluids, and his blood pressure seemed to respond appropriately.  He became more responsive as well.  He did have some momentary drops in his blood pressure, but overall he seemed to be improving with IV fluids and he was provided antibiotics as well due to my concern for sepsis and likely aspiration.  I subsequently spoke with Dr. Montalvo, who will be admitting him.  Due to his elevated creatinine, we are unable to proceed with a CT scan of his chest with contrast to look for a PE, as he is not currently anticoagulated.  However we did obtain an ultrasound of his lower extremities to look for DVT, and that was negative.  We are also obtaining a stat echocardiogram to look for any right heart strain or other cardiac issues.    Critical Care Time: was 35 minutes for this patient excluding procedures    Diagnosis:    ICD-10-CM    1. Hypoxia  R09.02       2. Altered mental status, unspecified altered mental status type  R41.82       3. Acute kidney injury  (H)  N17.9       4. Severe sepsis (H)  A41.9     R65.20           Scribe Disclosure:  I, Iván Quispe, am serving as a scribe at 9:55 AM on 11/6/2022 to document services personally performed by Luis Castano MD based on my observations and the provider's statements to me.         Luis Castano MD  11/06/22 4513

## 2022-11-06 NOTE — PLAN OF CARE
Goal Outcome Evaluation:  A&Ox3, d/sit.  VSS on 5L O2 w/ oxymask. Tele: A-fib w/ BBB CVR-RVT in low 100's. Suazo patent with adequate output. LS: Dim BS: audible. Bedfast at this time.

## 2022-11-06 NOTE — ED TRIAGE NOTES
Pt arrived via EMS from home due to 911 call for low sats - on arrival to pt's house was 45% - ems BVM  And blood sugar was 119 . Pt unresponsive on arrival . Per report family stated that overnight pt's oxygen falls off and then he will be low sats in the morning.   Pt was recently hospitalized for aspiration and low sats.

## 2022-11-07 ENCOUNTER — APPOINTMENT (OUTPATIENT)
Dept: CT IMAGING | Facility: CLINIC | Age: 87
DRG: 871 | End: 2022-11-07
Attending: INTERNAL MEDICINE
Payer: COMMERCIAL

## 2022-11-07 ENCOUNTER — APPOINTMENT (OUTPATIENT)
Dept: CARDIOLOGY | Facility: CLINIC | Age: 87
DRG: 871 | End: 2022-11-07
Attending: EMERGENCY MEDICINE
Payer: COMMERCIAL

## 2022-11-07 ENCOUNTER — APPOINTMENT (OUTPATIENT)
Dept: SPEECH THERAPY | Facility: CLINIC | Age: 87
DRG: 871 | End: 2022-11-07
Attending: INTERNAL MEDICINE
Payer: COMMERCIAL

## 2022-11-07 LAB
ANION GAP SERPL CALCULATED.3IONS-SCNC: 3 MMOL/L (ref 3–14)
BASOPHILS # BLD AUTO: 0 10E3/UL (ref 0–0.2)
BASOPHILS NFR BLD AUTO: 0 %
BUN SERPL-MCNC: 30 MG/DL (ref 7–30)
CALCIUM SERPL-MCNC: 8 MG/DL (ref 8.5–10.1)
CHLORIDE BLD-SCNC: 117 MMOL/L (ref 94–109)
CO2 SERPL-SCNC: 26 MMOL/L (ref 20–32)
CREAT SERPL-MCNC: 1.33 MG/DL (ref 0.66–1.25)
EOSINOPHIL # BLD AUTO: 0.1 10E3/UL (ref 0–0.7)
EOSINOPHIL NFR BLD AUTO: 0 %
ERYTHROCYTE [DISTWIDTH] IN BLOOD BY AUTOMATED COUNT: 15.6 % (ref 10–15)
GFR SERPL CREATININE-BSD FRML MDRD: 51 ML/MIN/1.73M2
GLUCOSE BLD-MCNC: 118 MG/DL (ref 70–99)
HCT VFR BLD AUTO: 30.7 % (ref 40–53)
HGB BLD-MCNC: 9.3 G/DL (ref 13.3–17.7)
IMM GRANULOCYTES # BLD: 0.1 10E3/UL
IMM GRANULOCYTES NFR BLD: 1 %
LACTATE SERPL-SCNC: 0.5 MMOL/L (ref 0.7–2)
LVEF ECHO: NORMAL
LYMPHOCYTES # BLD AUTO: 0.7 10E3/UL (ref 0.8–5.3)
LYMPHOCYTES NFR BLD AUTO: 5 %
MCH RBC QN AUTO: 28.4 PG (ref 26.5–33)
MCHC RBC AUTO-ENTMCNC: 30.3 G/DL (ref 31.5–36.5)
MCV RBC AUTO: 94 FL (ref 78–100)
MONOCYTES # BLD AUTO: 1.2 10E3/UL (ref 0–1.3)
MONOCYTES NFR BLD AUTO: 9 %
NEUTROPHILS # BLD AUTO: 10.9 10E3/UL (ref 1.6–8.3)
NEUTROPHILS NFR BLD AUTO: 85 %
NRBC # BLD AUTO: 0 10E3/UL
NRBC BLD AUTO-RTO: 0 /100
PLATELET # BLD AUTO: 123 10E3/UL (ref 150–450)
POTASSIUM BLD-SCNC: 3.8 MMOL/L (ref 3.4–5.3)
RBC # BLD AUTO: 3.27 10E6/UL (ref 4.4–5.9)
SODIUM SERPL-SCNC: 146 MMOL/L (ref 133–144)
WBC # BLD AUTO: 13 10E3/UL (ref 4–11)

## 2022-11-07 PROCEDURE — 87040 BLOOD CULTURE FOR BACTERIA: CPT | Performed by: INTERNAL MEDICINE

## 2022-11-07 PROCEDURE — 99233 SBSQ HOSP IP/OBS HIGH 50: CPT | Performed by: INTERNAL MEDICINE

## 2022-11-07 PROCEDURE — 83605 ASSAY OF LACTIC ACID: CPT | Performed by: INTERNAL MEDICINE

## 2022-11-07 PROCEDURE — 85025 COMPLETE CBC W/AUTO DIFF WBC: CPT | Performed by: INTERNAL MEDICINE

## 2022-11-07 PROCEDURE — 250N000011 HC RX IP 250 OP 636: Performed by: INTERNAL MEDICINE

## 2022-11-07 PROCEDURE — 999N000128 HC STATISTIC PERIPHERAL IV START W/O US GUIDANCE

## 2022-11-07 PROCEDURE — 36415 COLL VENOUS BLD VENIPUNCTURE: CPT | Performed by: INTERNAL MEDICINE

## 2022-11-07 PROCEDURE — 71250 CT THORAX DX C-: CPT

## 2022-11-07 PROCEDURE — 250N000013 HC RX MED GY IP 250 OP 250 PS 637: Performed by: INTERNAL MEDICINE

## 2022-11-07 PROCEDURE — 120N000001 HC R&B MED SURG/OB

## 2022-11-07 PROCEDURE — 999N000208 ECHOCARDIOGRAM COMPLETE

## 2022-11-07 PROCEDURE — 80048 BASIC METABOLIC PNL TOTAL CA: CPT | Performed by: INTERNAL MEDICINE

## 2022-11-07 PROCEDURE — 92610 EVALUATE SWALLOWING FUNCTION: CPT | Mod: GN | Performed by: SPEECH-LANGUAGE PATHOLOGIST

## 2022-11-07 PROCEDURE — 92526 ORAL FUNCTION THERAPY: CPT | Mod: GN | Performed by: SPEECH-LANGUAGE PATHOLOGIST

## 2022-11-07 PROCEDURE — 255N000002 HC RX 255 OP 636: Performed by: INTERNAL MEDICINE

## 2022-11-07 PROCEDURE — 258N000003 HC RX IP 258 OP 636: Performed by: INTERNAL MEDICINE

## 2022-11-07 PROCEDURE — 93306 TTE W/DOPPLER COMPLETE: CPT | Mod: 26 | Performed by: INTERNAL MEDICINE

## 2022-11-07 PROCEDURE — 258N000002 HC RX IP 258 OP 250: Performed by: INTERNAL MEDICINE

## 2022-11-07 RX ORDER — TAMSULOSIN HYDROCHLORIDE 0.4 MG/1
0.4 CAPSULE ORAL EVERY EVENING
Status: DISCONTINUED | OUTPATIENT
Start: 2022-11-07 | End: 2022-11-12 | Stop reason: HOSPADM

## 2022-11-07 RX ORDER — SODIUM CHLORIDE 450 MG/100ML
INJECTION, SOLUTION INTRAVENOUS CONTINUOUS
Status: DISCONTINUED | OUTPATIENT
Start: 2022-11-07 | End: 2022-11-08

## 2022-11-07 RX ADMIN — CEFEPIME HYDROCHLORIDE 2 G: 2 INJECTION, POWDER, FOR SOLUTION INTRAVENOUS at 21:34

## 2022-11-07 RX ADMIN — DOXYCYCLINE 100 MG: 100 INJECTION, POWDER, LYOPHILIZED, FOR SOLUTION INTRAVENOUS at 13:47

## 2022-11-07 RX ADMIN — DOXYCYCLINE 100 MG: 100 INJECTION, POWDER, LYOPHILIZED, FOR SOLUTION INTRAVENOUS at 22:17

## 2022-11-07 RX ADMIN — UMECLIDINIUM 1 PUFF: 62.5 AEROSOL, POWDER ORAL at 09:55

## 2022-11-07 RX ADMIN — SODIUM CHLORIDE: 4.5 INJECTION, SOLUTION INTRAVENOUS at 14:49

## 2022-11-07 RX ADMIN — SODIUM CHLORIDE: 9 INJECTION, SOLUTION INTRAVENOUS at 03:56

## 2022-11-07 RX ADMIN — TAMSULOSIN HYDROCHLORIDE 0.4 MG: 0.4 CAPSULE ORAL at 20:40

## 2022-11-07 RX ADMIN — METOPROLOL TARTRATE 12.5 MG: 25 TABLET, FILM COATED ORAL at 20:41

## 2022-11-07 RX ADMIN — HUMAN ALBUMIN MICROSPHERES AND PERFLUTREN 9 ML: 10; .22 INJECTION, SOLUTION INTRAVENOUS at 08:56

## 2022-11-07 RX ADMIN — FLUTICASONE FUROATE AND VILANTEROL TRIFENATATE 1 PUFF: 200; 25 POWDER RESPIRATORY (INHALATION) at 09:56

## 2022-11-07 ASSESSMENT — ACTIVITIES OF DAILY LIVING (ADL)
ADLS_ACUITY_SCORE: 41
ADLS_ACUITY_SCORE: 42
ADLS_ACUITY_SCORE: 42
ADLS_ACUITY_SCORE: 41
ADLS_ACUITY_SCORE: 42
ADLS_ACUITY_SCORE: 41
ADLS_ACUITY_SCORE: 42
ADLS_ACUITY_SCORE: 41

## 2022-11-07 NOTE — PLAN OF CARE
Neuro: alert, oriented to self and place at times situation  Tele/cardiac: Afib CVR w/BBB  Respiration: On 3L oxymax  Activity: not OOB  Pain: denies  Drips: NS @ 75 ml/hr  Drains/tubes: none  Skin: blanchable redness to coccyx  GI/: had dark colored BM x1, MD aware, Deferred to morning rounding MD. Patient on iron supplement and hemoglobin improved since October admission.   Aggression color: green  Isolation: none  Plan: wean oxygen as able

## 2022-11-07 NOTE — PROGRESS NOTES
[facility]  Hospitalist Progress Note  Name: Hermilo Velasquez    MRN: [id number]  Physician:  Armaan Cota DO, FHM (Text Page)  Securely message with the Vocera Web Console (learn more here)    Summary of Stay:  Mr. Hermilo Velasquez is a 89-year-old male patient with history including COPD on chronic oxygen nocturnally; CHF (HFpEF); paroxysmal atrial fibrillation; aortic stenosis, status post TAVR; chronic kidney disease stage III; BPH; chronic dysphagia; history of PE and DVT; history of GI bleed felt to be related to colonic AVMs (08/2022); history of lung cancer status post right lower lobe resection (2019); and a recent hospitalization at [facility] (10/09/2022-10/17/2022) with issues including sepsis due to pneumonia with acute hypoxic respiratory failure.  He presented with the above issues presents after being found unresponsive with his oxygen off and noted to be hypotensive with acute hypoxic respiratory failure.    Assessment & Plan    Acute hypoxic respiratory failure with hypotension, unclear etiology, question septic shock, possible respiratory source (aspiration pneumonia not visualized on CXR)  Suspect recurrent aspiration as element to presentation/Acute on chronic dysphagia:  * Initial presentation as above.  He has been given IV fluids and broad antibiotics including cefepime and doxycycline.   A stat echocardiogram and ultrasound and lower extremity ultrasound ordered.  No major cardiac change to explain presentation.  No DVT.    -  Continue empiric abx, await cultures which are pending.  Suspicious patient having intermittent aspiration contributing to his issues.  Speech seeing today.  They are advising some thickening of liquids but patient doesn't want them and wants to say with thins.  Obtaining CT today to see how prior imaging changes have progressed.   Continue O2, basic aspiration precautions.  Will need to work with family/patient on a goals of care discussion  further over concern of aspiration risk.  I will ask palliative care to see him to help with goals of care discussions around swallowing/diet.       Altered mental status, suspect due to metabolic and infectious/septic encephalopathy:  - Treat other issues as noted.  - Re-orient as needed.  - Maintain normal day/night, sleep/wake cycles.  - Minimize sedating medications as able.     Acute kidney injury on CKD3, suspect prerenal.  * Cr 1.77 on admit. Previously normal 10/2022.  Improved today after holding diuretics and giving some IVF.  At this point will reduce IVF rate (also change to 1/2 NS) and keep diuretics on hold for at least one more day.    - Monitor BMP.   - Avoid nephrotoxic medications.      COPD:    - Continue fluticasone-vilanterol, tiotropium and p.r.n. albuterol.     Congestive heart failure.  Aortic stenosis, status post TAVR:   * Echo with a little high valve gradient.  No dramatic change to explain recent acute issues.  Recommend outpatient cardiology follow-up.  - Hold PTA bumetanide for now (see discussion above).    - Hold PTA metoprolol with initial low BP's.  If continue to improve today will resume partial dose this evening.    - Monitor I's and O's, daily weights.     Dyslipidemia:   - Continue atorvastatin as able.     Paroxysmal atrial fibrillation:    * The patient has been off apixaban since GI bleed in August 2022.  - Hold prior to admission metoprolol this AM as BP still low normal, will try to resume this evening if BP trending up.       Chronic anemia:    * Hgb stable at 10.4 on admit.  - Continue iron supplement as able.  - Monitor CBC.  - Consider prbc transfusion if hgb </= 7.0 or if significant bleeding with hemodynamic instability or if symptomatic.     GERD:    - Continue famotidine as able.     BPH:    - Hold prior to admission finasteride until BP further improves.  Will cautiously resume flomax for this evening.     H/o GI bleed in 08/2022, possibly due to colonic  AVM's.  * Seen by Chadd BLAKE at that time. Required blood transfusion. EGD during the hospitalization was negative; colonoscopy deferred due to agitated delirium and clinical stability.  - Monitor clinically for now.           COVID Status:  COVID-19 PCR Results    COVID-19 PCR Results 4/13/21 3/5/22 4/10/22 5/8/22 7/22/22 8/23/22 10/9/22 11/6/22   COVID-19 Virus by PCR (External Result)  Not Detected         Flu A/B & SARS-COV-2 PCR Source Nasopharyngeal          SARS-CoV-2 PCR Result NEGATIVE          SARS CoV2 PCR   Negative Negative Negative Negative Negative Negative      Comments are available for some flowsheets but are not being displayed.         COVID-19 Antibody Results, Testing for Immunity    COVID-19 Antibody Results, Testing for Immunity   No data to display.            Diet: Soft & Bite Sized Diet (level 6) Mildly Thick (level 2) (Small sips, no straw)    DVT Prophylaxis: Pneumatic Compression Devices  Suazo Catheter: PRESENT, indication: Strict 1-2 Hour I&O    Cardiac Monitoring: ACTIVE order. Indication: See H&P and/or Progress Notes    Code Status: Full Code  (Has prior had pal discussions.  See above discussion for further goal of care discussions)      Disposition Plan   Expect at least a 2 night hospital stay.     Entered: Armaan Cota DO 11/07/2022, 12:55 PM       Interval History   Assumed care for the day, history reviewed.  Mr. Velasquez reports he feels better.  Mildly SOB/cough today.  No pain.  Feels hungry.  No other new complaints.    -Data reviewed today: I reviewed all new labs and imaging reports over the last 24 hours. I personally reviewed no images or EKG's today.    Physical Exam   Temp: 98.1  F (36.7  C) Temp src: Oral BP: 116/68 Pulse: 103   Resp: 14 SpO2: 94 % O2 Device: Oxymask Oxygen Delivery: 2.5 LPM  Vitals:    11/07/22 0521   Weight: 74.6 kg (164 lb 7.4 oz)     Vital Signs with Ranges  Temp:  [97.7  F (36.5  C)-98.6  F (37  C)] 98.1  F (36.7  C)  Pulse:  []  103  Resp:  [14-37] 14  BP: ()/(55-72) 116/68  SpO2:  [84 %-99 %] 94 %  I/O last 3 completed shifts:  In: 120 [P.O.:120]  Out: 1200 [Urine:1200]    GEN:  Alert, oriented but confused with a few details, appears ill but comfortable, no overt distress.  HEENT:  Normocephalic/atraumatic, no scleral icterus, no nasal discharge, mouth moist.  CV:  Regular rate and rhythm, distant.  No rub.  LUNGS:  Clear to auscultation upper with decreased breath sounds/coarse sounds bases.  No wheezes/retractions.  Symmetric chest rise on inhalation noted.  ABD:  Active bowel sounds, soft, non-tender/non-distended.  No guarding/rigidity.  EXT:  Trace edema.  No cyanosis.  No acute joint synovitis noted.  SKIN:  Dry to touch, no exanthems noted in the visualized areas.    Medications     NaCl         ceFEPIme  2 g Intravenous Q12H     doxycycline  100 mg Intravenous Q12H     fluticasone-vilanterol  1 puff Inhalation Daily     sodium chloride (PF)  3 mL Intracatheter Q8H     sodium chloride (PF)  3 mL Intracatheter Q8H     umeclidinium  1 puff Inhalation Daily     Data     Recent Labs   Lab 11/07/22  0519 11/06/22  1007   WBC 13.0* 12.6*   HGB 9.3* 10.4*   HCT 30.7* 34.7*   MCV 94 95   * 172     7-Day Micro Results     Collected Updated Procedure Result Status      11/07/2022 1217 11/07/2022 1226 Blood Culture Wrist, Left [[id number]]   Blood from Wrist, Left    In process Component Value   No component results               11/06/2022 1009 11/06/2022 1101 Symptomatic; Unknown Influenza A/B & SARS-CoV2 (COVID-19) Virus PCR Multiplex Nasopharyngeal [[id number]]    Swab from Nasopharyngeal    Final result Component Value   Influenza A PCR Negative   Influenza B PCR Negative   RSV PCR Negative   SARS CoV2 PCR Negative   NEGATIVE: SARS-CoV-2 (COVID-19) RNA not detected, presumed negative.            11/06/2022 1007 11/07/2022 0116 Blood Culture Peripheral Blood [[id number]]   Peripheral Blood    Preliminary result  Component Value   Culture No growth after 12 hours  [P]                    Recent Labs   Lab 22  0519 22  1007   * 142   POTASSIUM 3.8 4.8   CHLORIDE 117* 111*   CO2 26 26   ANIONGAP 3 5   * 185*   BUN 30 29   CR 1.33* 1.77*  1.9*   GFRESTIMATED 51* 33*  36*   AMRITA 8.0* 8.0*   MAG  --  2.1   PROTTOTAL  --  6.5*   ALBUMIN  --  2.5*   BILITOTAL  --  0.4   ALKPHOS  --  87   AST  --  16   ALT  --  10       Recent Results (from the past 24 hour(s))   US Lower Extremity Venous Duplex Bilateral    Narrative    EXAM: US LOWER EXTREMITY VENOUS DUPLEX BILATERAL  LOCATION: [facility]  DATE/TIME: 2022 2:06 PM    INDICATION: Hypoxia. Shortness of breath.  COMPARISON: 2020.  TECHNIQUE: Venous Duplex ultrasound of bilateral lower extremities with and without compression, augmentation and duplex. Color flow and spectral Doppler with waveform analysis performed.    FINDINGS: Exam includes the common femoral, femoral, popliteal veins as well as segmentally visualized deep calf veins and greater saphenous vein.     RIGHT: No deep vein thrombosis. No superficial thrombophlebitis. No popliteal cyst.    LEFT: No deep vein thrombosis. No superficial thrombophlebitis. No popliteal cyst.      Impression    IMPRESSION:  1.  No deep venous thrombosis in the bilateral lower extremities.   Echocardiogram Complete   Result Value    LVEF  55-60%    Narrative    [id number]  [id number]  [id number]  [id number]^MAJO^ANDREY^NIXON     [facility]  Echocardiography Laboratory  [address] [place] [address]     Name: CLYDE RODRIGUEZ  MRN: [id number]  : 1932  Study Date: 2022 07:59 AM  Age: 89 yrs  Gender: Male  Patient Location: [facility]  Reason For Study: Dyspnea  Ordering Physician: ANDREY KASPER  Performed By: Davida Martinez RDCS     BSA: 1.9 m2  Height: 68 in  Weight: 164 lb  HR: 102  BP: 99/68  mmHg  ______________________________________________________________________________  Procedure  Complete Portable Echo Adult. Optison (NDC #1802-2056) given intravenously.  ______________________________________________________________________________  Interpretation Summary     Left ventricular systolic function is normal.  The visual ejection fraction is 55-60%.  No regional wall motion abnormalities noted.  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  The prosthetic aortic valve is not well visualized.  The mean AoV pressure gradient is 19mmHg.  The right ventricle is moderately dilated.  The right ventricular systolic function is mildly reduced.  Compared to 8/22, mean gradient across AV prosthesis has increased from 14 to  19mm.     The study was technically difficult.  ______________________________________________________________________________  Left Ventricle  The left ventricle is normal in size. There is normal left ventricular wall  thickness. Left ventricular systolic function is normal. The visual ejection  fraction is 55-60%. Left ventricular diastolic function is indeterminate. No  regional wall motion abnormalities noted. Septal motion is consistent with  conduction abnormality.     Right Ventricle  The right ventricle is moderately dilated. The right ventricular systolic  function is mildly reduced.     Atria  Normal left atrial size. The right atrium is severely dilated. 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     Mitral Valve  There is mild to moderate mitral annular calcification. There is trace to mild  mitral regurgitation.     Tricuspid Valve  There is trace to mild tricuspid regurgitation. The right ventricular systolic  pressure is approximated at 19.6 mmHg plus the right atrial pressure. IVC  diameter >2.1 cm collapsing <50% with sniff suggests a high RA pressure  estimated at 15 mmHg or greater.     Aortic Valve  There is trace aortic regurgitation. The mean AoV pressure gradient is 19mmHg.  There  is a bioprosthetic aortic valve. The prosthetic aortic valve is not well  visualized.     Pulmonic Valve  The pulmonic valve is not well visualized.     Vessels  The aortic root is normal size.     Pericardium  There is no pericardial effusion.     Rhythm  The rhythm was undetermined.  ______________________________________________________________________________  MMode/2D Measurements & Calculations     IVSd: 0.89 cm  LVIDd: 5.6 cm  LVIDs: 3.9 cm  LVPWd: 1.1 cm  FS: 30.1 %  LV mass(C)d: 217.9 grams  LV mass(C)dI: 116.0 grams/m2  Ao root diam: 3.5 cm  LA dimension: 3.7 cm  LA/Ao: 1.1  LVOT diam: 2.4 cm  LVOT area: 4.3 cm2  LA Volume (BP): 54.9 ml  LA Volume Index (BP): 29.2 ml/m2  RWT: 0.38     Doppler Measurements & Calculations  MV E max santi: 170.0 cm/sec  MV A max santi: 85.2 cm/sec  MV E/A: 2.0  MV dec slope: 1372 cm/sec2  Ao V2 max: 299.2 cm/sec  Ao max P.0 mmHg  Ao V2 mean: 201.6 cm/sec  Ao mean P.1 mmHg  Ao V2 VTI: 55.6 cm  АННА(I,D): 1.4 cm2  АННА(V,D): 1.4 cm2  LV V1 max PG: 3.6 mmHg  LV V1 max: 94.5 cm/sec  LV V1 VTI: 17.8 cm  SV(LVOT): 77.1 ml  SI(LVOT): 41.1 ml/m2  TR max santi: 221.3 cm/sec  TR max P.6 mmHg  AV Santi Ratio (DI): 0.32  АННА Index (cm2/m2): 0.74  E/E' av.3  Lateral E/e': 6.6  Medial E/e': 22.1     ______________________________________________________________________________  Report approved by: Calli Hill 2022 10:55 AM         CT Chest w/o Contrast    Narrative    CT CHEST WITHOUT CONTRAST 2022 10:30 AM     HISTORY: Hypoxia, sepsis unclear source, recent abnormal CT chest.    COMPARISON: 2022.    TECHNIQUE: Volumetric helical acquisition of CT images of the chest  from the clavicles to the kidneys were acquired without IV contrast.  Radiation dose for this scan was reduced using automated exposure  control, adjustment of the mA and/or kV according to patient size, or  iterative reconstruction technique.    FINDINGS:     LUNGS AND PLEURA:  Minimal bilateral effusions right worse than left.  Mild associated probable compressive atelectasis versus less likely  infiltrate. Effusion is minimally improved on the right and new on the  left compared to previous. Emphysema. Nodular and interstitial changes  in the right lung are improved from previous compatible with an  improving infiltrate.    MEDIASTINUM/AXILLAE: No gross adenopathy in the absence of contrast.    CORONARY ARTERY CALCIFICATIONS: Mild.    UPPER ABDOMEN: No acute findings. Low-attenuation subcentimeter liver  lesion(s) compatible with benign cysts or other benign lesions. No  specific evaluation or follow-up is recommended in a low risk patient.    MUSCULOSKELETAL: No frankly destructive bony lesions.      Impression    IMPRESSION:   1. Stable to minimally improved right pleural effusion and associated  probable compressive atelectasis versus less likely infiltrate.  2. New minimal left effusion with associated compressive atelectasis  and/or infiltrate.    FUENTES SIMON MD         SYSTEM ID:  [id number]

## 2022-11-07 NOTE — PROVIDER NOTIFICATION
MD Notification    Notified Person: MD    Notified Person Name: Dr. Cota    Notification Date/Time: 1029    Notification Interaction: HeadSprout messaging    Purpose of Notification: Could you order eye drops patient is complaining of dry eyes.  Pt also has orders to remove rodriguez that was placed yesterday do you want the rodriguez removed?     Orders Received: leave rodriguez for today, if he is doing well we can reevaluate this afternoon, eye drops ordered.

## 2022-11-07 NOTE — PROVIDER NOTIFICATION
MD Notification    Notified Person: MD Marcos    Notified Person Name: Crissy TurnerCECILIA batres    Notification Date/Time:11/6/22 @ 2048    Notification Interaction: Amcom    Purpose of Notification:Bedside RN reported 2 dark stools, requested page to be sent for occult blood order.     Orders Received: None. Deferred to morning rounding MD. Patient on iron supplement and hemoglobin improved since October admission.     Comments:

## 2022-11-07 NOTE — PROVIDER NOTIFICATION
Department of Anesthesiology  Postprocedure Note    Patient: Jamilah Sheppard  MRN: [id number]  YOB: 1953  Date of evaluation: 4/26/2022  Time:  11:40 AM     Procedure Summary     Date: 04/26/22 Room / Location: [facility] 01 / [facility]    Anesthesia Start: 1054 Anesthesia Stop: 1138    Procedure: COLONOSCOPY POLYPECTOMY HOT BIOPSY (Snare) (N/A ) Diagnosis: (AT RISK FOR COLON CANCER)    Surgeons: Yessiac Kwan MD Responsible Provider: Viraj Potter MD    Anesthesia Type: MAC ASA Status: 4          Anesthesia Type: MAC    Mando Phase I: Mando Score: 10    Mando Phase II:      Last vitals: Reviewed and per EMR flowsheets.        Anesthesia Post Evaluation    Patient location during evaluation: PACU  Patient participation: complete - patient participated  Level of consciousness: awake  Airway patency: patent  Nausea & Vomiting: no nausea and no vomiting  Complications: no  Cardiovascular status: hemodynamically stable  Respiratory status: acceptable  Hydration status: euvolemic Notified provider about indwelling rodriguez catheter discussed removal or continued need.    Did provider choose to remove indwelling rodriguez catheter? Yes    Provider's rodriguez indication for keeping indwelling rodriguez catheter: Strict 1-2 Hour I & O if external catheters are not an option.    Is there an order for indwelling rodriguez catheter? Yes    *If there is a plan to keep rodriguez catheter in place at discharge daily notification with provider is not necessary.

## 2022-11-07 NOTE — PROVIDER NOTIFICATION
MD Notification    Notified Person: MD    Notified Person Name: Dr. doty    Notification Date/Time: 1119    Notification Interaction: Julep messaging    Purpose of Notification:     microbiology lab only received 1 of 2 cultures ordered in ED yesterday, it appears ED only rom one do you want us to have the second set drawn?         Orders Received: collect another blood culture    Comments:

## 2022-11-07 NOTE — PROGRESS NOTES
SLP Bedside Swallow Evaluation  11/07/22 1212   Appointment Info   Signing Clinician's Name / Credentials (SLP) Deysi Mane MA [facility] SLP   General Information   Onset of Illness/Injury or Date of Surgery 11/06/22   Referring Physician Dr. Trinh   Patient/Family Therapy Goal Statement (SLP) No thickened liquids; POC to be determined   Pertinent History of Current Problem Per notes: Mr. Hermilo Velasquez is a 89-year-old male patient with history noted below including COPD on chronic oxygen nocturnally; CHF (HFpEF); paroxysmal atrial fibrillation; aortic stenosis, status post TAVR; chronic kidney disease stage III; BPH; chronic dysphagia; history of PE and DVT; history of GI bleed felt to be related to colonic AVMs (08/2022); history of lung cancer status post right lower lobe resection (2019); and a recent hospitalization at [facility] (10/09/2022-10/17/2022) with issues including sepsis due to pneumonia with acute hypoxic respiratory failure.   Dx:  Acute hypoxic respiratory failure with hypotension, unclear etiology, question septic shock, possible respiratory source      8/23/2022 Hospital stay notes include: While hospitalized he developed right-sided pneumonia thought to be due to aspiration.  Speech pathology was consulted but patient did not tolerate video swallow study and did not tolerate modified diet.  Palliative care was consulted for goals of care discussion and family decided to continue aggressive cares and full CODE STATUS;    10/9/2022 Hospital stay notes include: admit sepsis-due to worsening right-sided pneumonia and probable acute cystitis. Clinical swallow evaluation ordered per MD d/t concern for aspiration pneumonia. Of note, he had emesis day before admission and reports that he thinks he aspirated; VFSS emesis after study, min to mild-mod residue, min-mod penetration of thin liquids by cup, chin tuck eliminated penetration, but mod-max cues needed, diet 7, thin with chin tuck, reflux  precautions; GI stated esophageal dysmotility in past, no EGD done last admit, PPI and Zofran recommended   General Observations Pt demonstrated poor recall and insight into his dysphagia hx, recommended strategies/precautions, and current aspiration signs.  Pt coughing and hacking up phlegm during the session.   Type of Evaluation   Type of Evaluation Swallow Evaluation   Oral Motor   Oral Musculature generally intact   Dentition (Oral Motor)   Dentition (Oral Motor) significant number of missing teeth;dental appliance/dentures   Cough/Swallow/Gag Reflex (Oral Motor)   Comment, Cough/Swallow/Gag Reflex (Oral Motor) Productive cough on command   Vocal Quality/Secretion Management (Oral Motor)   Comment, Vocal Quality/Secretion Management (Oral Motor) Hoarse voicing, slight wet quality at times   General Swallowing Observations   Current Diet/Method of Nutritional Intake (General Swallowing Observations, NIS) soft & bite-sized (level 6);mildly thick liquids (level 2)   Respiratory Support (General Swallowing Observations) oxygen mask  (3L)   Clinical Swallow Evaluation   Clinical Swallow Evaluation Textures Trialed soft & bite-sized   Clinical Swallow Eval: Thin Liquid Texture Trial   Mode of Presentation, Thin Liquids cup   Volume of Liquid or Food Presented sips x 2   Oral Phase of Swallow premature pharyngeal entry   Pharyngeal Phase of Swallow reduction in laryngeal movement;repeated swallows  (delay suspected)   Diagnostic Statement pt was very impulsive and did not follow repeated mod cues to use a chin tuck and single sips; delayed congested coughing and decreased saturation levels to 86% observed after trials   Clinical Swallow Eval: Mildly Thick Liquids   Mode of Presentation cup   Volume Presented sips x 3 over 4 ounces   Oral Phase premature pharyngeal entry   Pharyngeal Phase reduction in laryngeal movement;repeated swallows  (delay suspected)   Diagnostic Statement pt was very impulsive and did not  follow repeated mod cues to use single sips; slight increase in intermittent wet voicing   Clinical Swallow Eval: Soft & Bite Sized   Mode of Presentation spoon   Volume Presented bite x 1   Oral Phase premature pharyngeal entry   Pharyngeal Phase reduction in laryngeal movement;repeated swallows  (delay suspected)   Diagnostic Statement pt was given mod cues to chew and swallow, decreased oral awareness, slight increase in intermittent wet voicing   Clinical Swallow Evaluation: Solid Food Texture Trial   Mode of Presentation fed by clinician   Volume Presented bite x 1   Oral Phase residue in oral cavity;impaired mastication   Pharyngeal Phase repeated swallows;reduction in laryngeal movement   Diagnostic Statement asking for something to clear oral cavity, 1/3 tsp pudding given to clear residue,  slight increase in intermittent wet voicing   Oral Residue   (mid tongue)   Swallowing Recommendations   Diet Consistency Recommendations soft & bite-sized (level 6);mildly thick liquids (level 2)   Supervision Level for Intake 1:1 supervision needed   Mode of Delivery Recommendations no straws;bolus size, small;slow rate of intake  (encourage use of a chin tuck, direct cues for single sips)   Swallowing Maneuver Recommendations alternate food and liquid intake;extra swallow   Monitoring/Assistance Required (Eating/Swallowing) monitor for cough or change in vocal quality with intake   Recommended Feeding/Eating Techniques (Swallow Eval) maintain upright sitting position for eating;maintain upright posture during/after eating for 30 minutes   Medication Administration Recommendations, Swallowing (SLP) Meds with mildly thick or puree   Comment, Swallowing Recommendations Hold po if respiratory status declines   Clinical Impression   Criteria for Skilled Therapeutic Interventions Met (SLP Eval) Yes, treatment indicated   SLP Diagnosis Mild oral-pharyngeal dysphagia, increased confusion impacting safety   Risks & Benefits of  therapy have been explained evaluation/treatment results reviewed;care plan/treatment goals reviewed;risks/benefits reviewed;current/potential barriers reviewed;participants included;participants voiced agreement with care plan;patient   Clinical Impression Comments Pt presents with mild oral-pharyngeal dysphagia at bedside; however, increased confusion and impulsivity are impacting impacting swallow safety.  Deficits/aspiration risk factors include hx of dysphagia, re-admission with hypoxia, delayed swallows, decreased elevation and need for multiple swallows, poor oral awareness and mastication of dry solid, oral residue, and coughing/decreased saturation levels to 86% after large sips of thin liquids.  Pt is not able to consistently use previously recommended  chin tuck and small sips with thin liquids.  Pt repeatedly stated wishes for no further use of thickened liquids.  Current IDDSI Diet Level 6 and mildly thick liquids with increased supervision/precautions are the safest least restrictive diet option without overt aspiration signs/increased oral residue. Recommend pt, family, MD discussion regarding pt's stated wishes for no thickener/return to thin liquids despite his increased aspiration risk with thin liquids at this time.  SLP to follow for swallow Tx as indicated per overall POC wishes.   SLP Total Evaluation Time   Eval: oral/pharyngeal swallow function, clinical swallow Minutes (52339) 15   Swallowing Dysfunction &/or Oral Function for Feeding   Treatment of Swallowing Dysfunction &/or Oral Function for Feeding Minutes (55109) 10   Symptoms Noted During/After Treatment None   Treatment Detail/Skilled Intervention Swallow: educated pt on his hx of dysphagia, reason for admission, question of aspiration, and previous VFSS results and recommendations.  pt did not recall previous swallow eval and Tx and stated he has no problem swallowing.  pt stated he did not want thickened liquids even if it would  reduce his aspiration risk.  pt required mod-max repeated cues in Tx to take a small sip of thin water with a chin tuck.  No overt aspiration signs or decreased aspiration signs noted with trial.  Pt is demonstrating inconsistent ability to follow cues for correct use of strategies.  Educated RN on above.  RN verbalized understanding to increase precautions with current diet and recommended POC family/pt/MD discussion.  MD text paged regarding above   SLP Discharge Planning   SLP Plan Meal f/u, train strategies, educate on aspiration risk   SLP Discharge Recommendation home;Transitional Care Facility   SLP Rationale for DC Rec Short term SLP Tx after d/c to train pt/caregiver strategie as indicated per overall POC   SLP Brief overview of current status  Mild dysphagia, increased aspiration risk currently due to impulsivity, poor awareness, inability to consistently use precautions; Rec: increased precautions/supervision with an IDDSI Diet Level 6 and mildly thick liquids   Total Session Time   Total Session Time (sum of timed and untimed services) 25

## 2022-11-07 NOTE — PROGRESS NOTES
Notified provider Dr. Cota about indwelling rodriguez catheter discussed removal or continued need.    Did provider choose to remove indwelling rodriguez catheter?  No    Provider's rodriguez indication for keeping indwelling rodriguez catheter: Strict 1-2 Hour I & O if external catheters are not an option.    Is there an order for indwelling rodriguez catheter? Yes    Per Provider CTM today, may re-evaluate later.

## 2022-11-08 ENCOUNTER — RESULTS ONLY (OUTPATIENT)
Facility: CLINIC | Age: 87
End: 2022-11-08

## 2022-11-08 ENCOUNTER — APPOINTMENT (OUTPATIENT)
Dept: SPEECH THERAPY | Facility: CLINIC | Age: 87
DRG: 871 | End: 2022-11-08
Payer: COMMERCIAL

## 2022-11-08 LAB
ANION GAP SERPL CALCULATED.3IONS-SCNC: 6 MMOL/L (ref 3–14)
ATRIAL RATE - MUSE: 66 BPM
BUN SERPL-MCNC: 24 MG/DL (ref 7–30)
CALCIUM SERPL-MCNC: 8.7 MG/DL (ref 8.5–10.1)
CHLORIDE BLD-SCNC: 117 MMOL/L (ref 94–109)
CO2 SERPL-SCNC: 24 MMOL/L (ref 20–32)
CREAT SERPL-MCNC: 0.98 MG/DL (ref 0.66–1.25)
DIASTOLIC BLOOD PRESSURE - MUSE: NORMAL MMHG
ERYTHROCYTE [DISTWIDTH] IN BLOOD BY AUTOMATED COUNT: 15.5 % (ref 10–15)
GFR SERPL CREATININE-BSD FRML MDRD: 73 ML/MIN/1.73M2
GLUCOSE BLD-MCNC: 92 MG/DL (ref 70–99)
HCT VFR BLD AUTO: 28.3 % (ref 40–53)
HGB BLD-MCNC: 8.8 G/DL (ref 13.3–17.7)
INTERPRETATION ECG - MUSE: NORMAL
LACTATE SERPL-SCNC: 0.5 MMOL/L (ref 0.7–2)
MCH RBC QN AUTO: 28.8 PG (ref 26.5–33)
MCHC RBC AUTO-ENTMCNC: 31.1 G/DL (ref 31.5–36.5)
MCV RBC AUTO: 93 FL (ref 78–100)
P AXIS - MUSE: NORMAL DEGREES
PLATELET # BLD AUTO: 116 10E3/UL (ref 150–450)
POTASSIUM BLD-SCNC: 3.8 MMOL/L (ref 3.4–5.3)
PR INTERVAL - MUSE: NORMAL MS
QRS DURATION - MUSE: 138 MS
QT - MUSE: 444 MS
QTC - MUSE: 465 MS
R AXIS - MUSE: -22 DEGREES
RBC # BLD AUTO: 3.06 10E6/UL (ref 4.4–5.9)
SODIUM SERPL-SCNC: 147 MMOL/L (ref 133–144)
SYSTOLIC BLOOD PRESSURE - MUSE: NORMAL MMHG
T AXIS - MUSE: 39 DEGREES
VENTRICULAR RATE- MUSE: 66 BPM
WBC # BLD AUTO: 8.7 10E3/UL (ref 4–11)

## 2022-11-08 PROCEDURE — 93005 ELECTROCARDIOGRAM TRACING: CPT

## 2022-11-08 PROCEDURE — 250N000013 HC RX MED GY IP 250 OP 250 PS 637: Performed by: INTERNAL MEDICINE

## 2022-11-08 PROCEDURE — 36415 COLL VENOUS BLD VENIPUNCTURE: CPT | Performed by: INTERNAL MEDICINE

## 2022-11-08 PROCEDURE — 92526 ORAL FUNCTION THERAPY: CPT | Mod: GN | Performed by: SPEECH-LANGUAGE PATHOLOGIST

## 2022-11-08 PROCEDURE — 250N000011 HC RX IP 250 OP 636: Performed by: INTERNAL MEDICINE

## 2022-11-08 PROCEDURE — 80048 BASIC METABOLIC PNL TOTAL CA: CPT | Performed by: INTERNAL MEDICINE

## 2022-11-08 PROCEDURE — 93010 ELECTROCARDIOGRAM REPORT: CPT | Performed by: INTERNAL MEDICINE

## 2022-11-08 PROCEDURE — 83605 ASSAY OF LACTIC ACID: CPT | Performed by: INTERNAL MEDICINE

## 2022-11-08 PROCEDURE — 999N000128 HC STATISTIC PERIPHERAL IV START W/O US GUIDANCE

## 2022-11-08 PROCEDURE — 85027 COMPLETE CBC AUTOMATED: CPT | Performed by: INTERNAL MEDICINE

## 2022-11-08 PROCEDURE — 99233 SBSQ HOSP IP/OBS HIGH 50: CPT | Performed by: INTERNAL MEDICINE

## 2022-11-08 PROCEDURE — 120N000001 HC R&B MED SURG/OB

## 2022-11-08 RX ORDER — FINASTERIDE 5 MG/1
5 TABLET, FILM COATED ORAL DAILY
Status: DISCONTINUED | OUTPATIENT
Start: 2022-11-08 | End: 2022-11-12 | Stop reason: HOSPADM

## 2022-11-08 RX ORDER — FERROUS SULFATE 325(65) MG
325 TABLET ORAL DAILY
Status: DISCONTINUED | OUTPATIENT
Start: 2022-11-08 | End: 2022-11-12 | Stop reason: HOSPADM

## 2022-11-08 RX ORDER — CARBOXYMETHYLCELLULOSE SODIUM 5 MG/ML
1 SOLUTION/ DROPS OPHTHALMIC
Status: DISCONTINUED | OUTPATIENT
Start: 2022-11-08 | End: 2022-11-12 | Stop reason: HOSPADM

## 2022-11-08 RX ORDER — FAMOTIDINE 20 MG/1
20 TABLET, FILM COATED ORAL 2 TIMES DAILY
Status: DISCONTINUED | OUTPATIENT
Start: 2022-11-08 | End: 2022-11-12 | Stop reason: HOSPADM

## 2022-11-08 RX ORDER — METOPROLOL TARTRATE 25 MG/1
25 TABLET, FILM COATED ORAL 2 TIMES DAILY
Status: DISCONTINUED | OUTPATIENT
Start: 2022-11-08 | End: 2022-11-12 | Stop reason: HOSPADM

## 2022-11-08 RX ADMIN — UMECLIDINIUM 1 PUFF: 62.5 AEROSOL, POWDER ORAL at 10:42

## 2022-11-08 RX ADMIN — FAMOTIDINE 20 MG: 20 TABLET ORAL at 10:25

## 2022-11-08 RX ADMIN — FAMOTIDINE 20 MG: 20 TABLET ORAL at 20:17

## 2022-11-08 RX ADMIN — FLUTICASONE FUROATE AND VILANTEROL TRIFENATATE 1 PUFF: 200; 25 POWDER RESPIRATORY (INHALATION) at 10:42

## 2022-11-08 RX ADMIN — METOPROLOL TARTRATE 25 MG: 25 TABLET, FILM COATED ORAL at 10:25

## 2022-11-08 RX ADMIN — CEFEPIME HYDROCHLORIDE 2 G: 2 INJECTION, POWDER, FOR SOLUTION INTRAVENOUS at 21:49

## 2022-11-08 RX ADMIN — Medication 1 DROP: at 13:58

## 2022-11-08 RX ADMIN — FERROUS SULFATE TAB 325 MG (65 MG ELEMENTAL FE) 325 MG: 325 (65 FE) TAB at 10:25

## 2022-11-08 RX ADMIN — TAMSULOSIN HYDROCHLORIDE 0.4 MG: 0.4 CAPSULE ORAL at 20:17

## 2022-11-08 RX ADMIN — DOXYCYCLINE 100 MG: 100 INJECTION, POWDER, LYOPHILIZED, FOR SOLUTION INTRAVENOUS at 22:51

## 2022-11-08 RX ADMIN — DOXYCYCLINE 100 MG: 100 INJECTION, POWDER, LYOPHILIZED, FOR SOLUTION INTRAVENOUS at 10:34

## 2022-11-08 RX ADMIN — Medication 1 DROP: at 21:21

## 2022-11-08 RX ADMIN — CEFEPIME HYDROCHLORIDE 2 G: 2 INJECTION, POWDER, FOR SOLUTION INTRAVENOUS at 09:47

## 2022-11-08 RX ADMIN — FINASTERIDE 5 MG: 5 TABLET, FILM COATED ORAL at 13:58

## 2022-11-08 ASSESSMENT — ACTIVITIES OF DAILY LIVING (ADL)
ADLS_ACUITY_SCORE: 42

## 2022-11-08 NOTE — PROGRESS NOTES
Palliative consult received for goals of care. Reviewed case with Dr. Trinh, who declines need for palliative consult at this time. Consult canceled. Please do not hesitate to contact me or reconsult our team if new issues arise. Thanks.    ILYA [facility]  Contact information available via [facility] Paging/Directory

## 2022-11-08 NOTE — PROGRESS NOTES
Notified provider about indwelling rodriguez catheter discussed removal or continued need.    Did provider choose to remove indwelling rodriguez catheter? Yes    Ordriguez removed 11/8 @ 8090

## 2022-11-08 NOTE — PLAN OF CARE
SLP - Swallow Tx Update: Note MD and pt discussion regarding thin vs thickened liquids and pt wishes to return to thin liquids despite potential for increased aspiration.  Pt re-stated his wishes to SLP this pm for no thickened liquids.  Pt demonstrated improved attention and ability to follow min-mod cues to use a chin tuck with thin liquid trials in Tx.  Intermittent wet voicing observed after thin liquid swallows x 10+ sips by cup with a chin tuck.  Increased coughing noted after dry solid trials in oral cavity and pt used thin liquids to rinse on 2/2 trials.  Educated pt and RN regarding known pt wishes and slight improvement in swallow safety today with thin liquids; plan for thin liquid upgrade with cues to use a chin tuck, continue an IDDSI Diet Level 6 today, and plan to trial advanced food textures as indicated.  If wet voicing noted after swallows, cue pt to cough and swallow.

## 2022-11-08 NOTE — PLAN OF CARE
Neuro:orientation improving now AO x4, forgetful  Tele/cardiac: Afib/Aflutter CVR   Respiration: On 3L nasal canula  Activity: not OOB  Pain: denies  Drips: none  Drains/tubes: none  Skin: blanchable redness to coccyx, mepilex in place  GI/: rodriguez in place, soft and bite sized diet, pt refusing thickened liquids  Aggression color: green  Isolation: none  Plan: wean oxygen as able, palliative consult pending. IV infiltrated, MD paged and was okay with none last night, can try restarting one this AM

## 2022-11-08 NOTE — PROGRESS NOTES
[facility]    Internal Medicine Hospitalist Progress Note  11/08/2022  I evaluated patient on the above date.    Leland Trinh Jr., MD  [phone] (p)  Text Page  Vocera        Assessment & Plan New actions/orders today (11/08/2022) are underlined.    Mr. Hermilo Velasquez is a 89-year-old male patient with history including COPD on chronic oxygen nocturnally; CHF (HFpEF); paroxysmal atrial fibrillation; aortic stenosis, status post TAVR; chronic kidney disease stage III; BPH; chronic dysphagia; history of PE and DVT; history of GI bleed felt to be related to colonic AVMs (08/2022); history of lung cancer status post right lower lobe resection (2019); and a recent hospitalization at [facility] (10/09/2022-10/17/2022) with issues including sepsis due to pneumonia with acute hypoxic respiratory failure.  He presented 11/6/2022 with the above issues presents after being found unresponsive with his oxygen off and noted to be hypotensive with acute hypoxic respiratory failure.    On initial evaluation, temperature 97.9, heart rate 120, blood pressure 66/40, respiratory rate 20, oxygen saturations were in the 80s on room air.  He was placed on oxygen up to 10 liters.  Laboratory evaluation showed white count 12.6, hemoglobin 10.4, platelets 172; BMP showed notable for a creatinine 1.77 (of note, creatinine was normal on 10/17/2022) NTP-BNP 5267; lactate 2.3, procalcitonin 1.23; TSH 4.36, but free T4 was normal at 0.97; ABG showed a pH of 7.23, pCO2 of 65, pO2 is 40; COVID, influenza  RSV testing were negative; UA negative.  Chest x-ray showed previously seen infiltrates nearly resolved with no definite new infiltrates.        Acute hypoxic respiratory failure with hypotension, suspect septic shock due to aspiration pneumonia and also related to being off home oxygen.  * Appears that home nocturnal O2 fell off during the night PTA. Initial presentation as above. Started on cefepime and doxycycline  on admit. BC's 11/6 NGTD. Bilateral lower extremity US 11/6 negative for DVT.  * 11/7: BP's and O2 requirements improved. Echo 11/7 showed LVEF 55-60%, no regional wall motion abnormalities; RV moderate dilated, RV systolic function mildly reduced; prosthetic AV not well visualized, mean gradient 19 mmHg. CT chest 11/7 showed stable to minimally improved right pleural effusion and associated probable compressive atelectasis versus less likely infiltrate; new minimal left effusion with associated compressive atelectasis and/or infiltrate.   * 11/8: Down to 2L/RA.  - Continue O2, wean as able.  - Continue doxycycline (started 11/6) and cefepime (started 11/6).  - Discontinue IVF's.  - Continue to monitor cultures.  - Dysphagia management as below.    COPD.    - Continue O2 at night and PRN.  - Continue fluticasone-vilanterol, umeclidinium and PRN albuterol.    Chronic dysphagia.  * Concern for aspiration pneumonia as above. SLP consulted on admit.  * On 11/7, patient expressed strong dislike for thickened liquids. Palliative care consulted 11/7 to see him to help with goals of care discussions around swallowing/diet.    - Continue dysphagia level 6 diet with mildly thickened liquids, advance diet as able per SLP; may advance for pt comfort knowing aspiration risks.  - Continue aspiration precautions.  - I d/w Palliative care 11/8; from speaking with pt who is more clear and less confused today, he definitely wants to have thin liquids and does not like modified diet; as such, do not feel we need Palliative evaluation for now and will cancel consult; appreciate help.    Altered mental status, suspect due to metabolic and infectious/septic encephalopathy.  * Initial presentation as above.  * On 11/7, mental status improved.  * On 11/8, mental status much improved compared with admit.  - Continue to treat other issues as noted.  - Re-orient as needed.  - Maintain normal day/night, sleep/wake cycles.  - Minimize sedating  medications as able.    Acute kidney injury on CKD3, suspect prerenal.  * Cr 1.77 on admit. Previously normal 10/2022.  * Cr normalized 11/8.  Recent Labs   Lab 11/08/22  0506 11/07/22  0519 11/06/22  1007   CR 0.98 1.33* 1.77*  1.9*   - Discontinue IVF's.  - Hold PTA bumetanide for now.  - Monitor BMP.  - Avoid nephrotoxic medications.    Hypernatremia due to hypertonic fluids and dehydration.  * Sodium normal on admit.  * Sodium up to 146 on 11/7. IVF's changed to 1/2 NS.  Recent Labs   Lab 11/08/22  0506 11/07/22  0519 11/06/22  1007   * 146* 142   - Discontinue IVF's.  - Continue to encourage oral rehydration.    Thrombocytopenia, unclear etiology, possibly from infection/sepsis, medication effect or from other cause.  * Platelets normal on admit 11/6.  * Platelets decreased 11/7.  Recent Labs   Lab 11/08/22  0506 11/07/22 0519 11/06/22  1007   * 123* 172   - Monitor CBC.  - Consider platelet transfusion if needs a procedure and less than 50,000; or if less than 10,000.    Congestive heart failure (LV diastolic, RV systolic)  Aortic stenosis, status post TAVR.   [PTA BP meds/diuretics: bumetanide 0.5 mg daily; metoprolol 25 mg BID.]  * Echocardiogram from 08/2020 showed left ventricular EF of 50% to 55%; right ventricle appeared moderate to severely enlarged in certain views and function could not be assessed due to poor image quality; prosthetic aortic valve was not well visualized.  * Echo 11/7 showed LVEF 55-60%, no regional wall motion abnormalities; RV moderate dilated, RV systolic function mildly reduced; prosthetic AV not well visualized, mean gradient 19 mmHg.  * Metoprolol restarted at lower dose 11/7.  - Increase metoprolol 12.5 --> 25 mg BID.    - Continue to hold PTA bumetanide for now.    - Monitor I's and O's, daily weights.    Paroxysmal atrial fibrillation.    * The patient has been off apixaban since GI bleed in August 2022.  - Restart metoprolol 25 mg BID.    Weakness and  physical deconditioning due to multiple acute and chronic medical issues.  - Continue PT and OT.    Dyslipidemia.   - Continue atorvastatin at discharge.    Chronic anemia.    * Hgb stable at 10.4 on admit.  * Hgb dropped to 8.8 11/8, suspect dilutional.  Recent Labs   Lab 11/08/22  0506 11/07/22  0519 11/06/22  1007   HGB 8.8* 9.3* 10.4*   - Continue iron supplement.  - Monitor CBC.  - Consider prbc transfusion if hgb </= 7.0 or if significant bleeding with hemodynamic instability or if symptomatic.    GERD.    - Continue famotidine.    BPH.    * Suazo placed in ED on admit.  - Discontinue Suazo.  - Restart finasteride.  - Continue tamsulosin.    H/o GI bleed in 08/2022, possibly due to colonic AVM's.  * Seen by Chadd BLAKE at that time. Required blood transfusion. EGD during the hospitalization was negative; colonoscopy deferred due to agitated delirium and clinical stability.  - Monitor clinically.      Clinically Significant Risk Factors         # Hypernatremia: Highest Na = 147 mmol/L (Ref range: 136-145) in last 2 days, will monitor as appropriate      # Hypoalbuminemia: Lowest albumin = 2.5 g/dL (Ref range: 3.5-5.2) at 11/6/2022 10:07 AM, will monitor as appropriate   # Thrombocytopenia: Lowest platelets = 116 (Ref range: 150-450) in last 2 days, will monitor for bleeding                  COVID-19 testing.  COVID-19 PCR Results    COVID-19 PCR Results 4/13/21 3/5/22 4/10/22 5/8/22 7/22/22 8/23/22 10/9/22 11/6/22   COVID-19 Virus by PCR (External Result)  Not Detected         Flu A/B & SARS-COV-2 PCR Source Nasopharyngeal          SARS-CoV-2 PCR Result NEGATIVE          SARS CoV2 PCR   Negative Negative Negative Negative Negative Negative      Comments are available for some flowsheets but are not being displayed.         COVID-19 Antibody Results, Testing for Immunity    COVID-19 Antibody Results, Testing for Immunity   No data to display.             Diet: Soft & Bite Sized Diet (level 6) Mildly Thick (level  2) (Small sips, no straw)    Prophylaxis: PCD's, ambulation.   Suazo Catheter: PRESENT, indication: Strict 1-2 Hour I&O  Central Lines: None  Code Status: Full Code    Disposition Plan   Expected discharge: 2-3d recommended to prior living arrangement pending above.  Entered: Lealnd Trinh MD 11/08/2022, 9:23 AM         Interval History   Asking about thin liquids.  Feeling better overall.  Noted to be less confused.    -Data reviewed today: I reviewed all new labs and imaging over the last 24 hours. I personally reviewed no images or EKG's today.    Physical Exam    , Blood pressure 128/72, pulse 87, temperature 98.6  F (37  C), temperature source Oral, resp. rate 19, weight 75.5 kg (166 lb 7.2 oz), SpO2 96 %. O2 Device: None (Room air) Oxygen Delivery: 2 LPM  Vitals:    11/07/22 0521 11/08/22 0552   Weight: 74.6 kg (164 lb 7.4 oz) 75.5 kg (166 lb 7.2 oz)     Vital Signs with Ranges  Temp:  [97.5  F (36.4  C)-98.6  F (37  C)] 98.6  F (37  C)  Pulse:  [] 87  Resp:  [14-37] 19  BP: (107-130)/(54-76) 128/72  SpO2:  [92 %-97 %] 96 %  Patient Vitals for the past 24 hrs:   BP Temp Temp src Pulse Resp SpO2 Weight   11/08/22 0730 -- 98.6  F (37  C) Oral -- -- -- --   11/08/22 0600 128/72 -- -- 87 19 96 % --   11/08/22 0552 -- -- -- -- -- -- 75.5 kg (166 lb 7.2 oz)   11/08/22 0400 111/54 -- -- 98 18 96 % --   11/08/22 0300 -- 97.5  F (36.4  C) Oral -- -- -- --   11/08/22 0200 127/65 -- -- 69 25 -- --   11/08/22 0000 130/66 97.7  F (36.5  C) Oral 67 22 97 % --   11/07/22 2200 107/63 -- -- 67 19 96 % --   11/07/22 2036 118/74 97.8  F (36.6  C) Oral 79 29 96 % --   11/07/22 1800 116/76 -- -- 101 -- 97 % --   11/07/22 1600 117/62 98.1  F (36.7  C) Oral 101 24 92 % --   11/07/22 1200 116/68 -- -- 103 14 94 % --   11/07/22 1123 -- 98.1  F (36.7  C) Oral -- -- -- --   11/07/22 1000 116/69 -- -- 93 (!) 37 94 % --     I/O's Last 24 hours  I/O last 3 completed shifts:  In: 240 [P.O.:240]  Out: 1050  [Urine:1050]    Constitutional: Awake, alert, oriented.  Respiratory: Diminished in bases. No crackles or wheezes.  Cardiovascular: RRR, no m/r/g.  GI: Soft, nt, nd, +BS.  Skin/Integumen: No leg edema.  Other:        Data   Recent Labs   Lab 11/08/22  0506 11/07/22 0519 11/06/22  1007   WBC 8.7 13.0* 12.6*   HGB 8.8* 9.3* 10.4*   MCV 93 94 95   * 123* 172   * 146* 142   POTASSIUM 3.8 3.8 4.8   CHLORIDE 117* 117* 111*   CO2 24 26 26   BUN 24 30 29   CR 0.98 1.33* 1.77*  1.9*   ANIONGAP 6 3 5   AMRITA 8.7 8.0* 8.0*   GLC 92 118* 185*   ALBUMIN  --   --  2.5*   PROTTOTAL  --   --  6.5*   BILITOTAL  --   --  0.4   ALKPHOS  --   --  87   ALT  --   --  10   AST  --   --  16   TROPONINIS  --   --  79*     Recent Labs   Lab Test 11/08/22  0506 11/07/22  0519 11/06/22  1007 10/17/22  0648 10/16/22  0555 09/15/20  0735 09/15/20  0200 03/29/20  0611 03/29/20  0157 03/25/20  0740 03/24/20  2206 03/24/20  1826 03/23/20  0459 03/23/20  0431   GLC 92 118* 185* 111* 109*   < >  --    < >  --    < >  --   --    < >  --    BGM  --   --   --   --   --   --  163*  --  103*  --  120* 121*  --  131*    < > = values in this interval not displayed.     Recent Labs   Lab 11/08/22  0506 11/07/22  0519 11/06/22  1238 11/06/22  1234 11/06/22  1007 11/06/22  1001   WBC 8.7 13.0*  --   --  12.6*  --    LACT 0.5* 0.5* 1.1  --   --  2.3*   PCAL  --   --   --  1.43* 1.23*  --          Recent Results (from the past 24 hour(s))   CT Chest w/o Contrast    Narrative    CT CHEST WITHOUT CONTRAST November 7, 2022 10:30 AM     HISTORY: Hypoxia, sepsis unclear source, recent abnormal CT chest.    COMPARISON: October 18, 2022.    TECHNIQUE: Volumetric helical acquisition of CT images of the chest  from the clavicles to the kidneys were acquired without IV contrast.  Radiation dose for this scan was reduced using automated exposure  control, adjustment of the mA and/or kV according to patient size, or  iterative reconstruction  technique.    FINDINGS:     LUNGS AND PLEURA: Minimal bilateral effusions right worse than left.  Mild associated probable compressive atelectasis versus less likely  infiltrate. Effusion is minimally improved on the right and new on the  left compared to previous. Emphysema. Nodular and interstitial changes  in the right lung are improved from previous compatible with an  improving infiltrate.    MEDIASTINUM/AXILLAE: No gross adenopathy in the absence of contrast.    CORONARY ARTERY CALCIFICATIONS: Mild.    UPPER ABDOMEN: No acute findings. Low-attenuation subcentimeter liver  lesion(s) compatible with benign cysts or other benign lesions. No  specific evaluation or follow-up is recommended in a low risk patient.    MUSCULOSKELETAL: No frankly destructive bony lesions.      Impression    IMPRESSION:   1. Stable to minimally improved right pleural effusion and associated  probable compressive atelectasis versus less likely infiltrate.  2. New minimal left effusion with associated compressive atelectasis  and/or infiltrate.    FUENTES SIMON MD         SYSTEM ID:  [id number]       Medications   All medications were reviewed.    NaCl 50 mL/hr at 11/07/22 1449       ceFEPIme  2 g Intravenous Q12H     doxycycline  100 mg Intravenous Q12H     fluticasone-vilanterol  1 puff Inhalation Daily     metoprolol tartrate  12.5 mg Oral BID     sodium chloride (PF)  3 mL Intracatheter Q8H     sodium chloride (PF)  3 mL Intracatheter Q8H     tamsulosin  0.4 mg Oral QPM     umeclidinium  1 puff Inhalation Daily     acetaminophen **OR** acetaminophen, albuterol, albuterol, melatonin, nitroGLYcerin, ondansetron **OR** ondansetron, polyethylene glycol, polyethylene glycol-propylene glycol PF, prochlorperazine **OR** prochlorperazine **OR** prochlorperazine, senna-docusate **OR** senna-docusate, sodium chloride (PF), sodium chloride (PF)

## 2022-11-08 NOTE — PLAN OF CARE
A&Ox2, disoriented to time and situation.VSS on 3L NC, tachy.TELE A-fib CVR.Lung Sounds diminished.Bowel Sounds active, no bm today. Suazo in place, producing adequate amount of urine. Pt denies pain. Tolerating bite sized mildly thick liquid diet, appetite poor.  0.45 saline infusing @50ml/hr

## 2022-11-09 ENCOUNTER — APPOINTMENT (OUTPATIENT)
Dept: SPEECH THERAPY | Facility: CLINIC | Age: 87
DRG: 871 | End: 2022-11-09
Payer: COMMERCIAL

## 2022-11-09 ENCOUNTER — APPOINTMENT (OUTPATIENT)
Dept: OCCUPATIONAL THERAPY | Facility: CLINIC | Age: 87
DRG: 871 | End: 2022-11-09
Attending: INTERNAL MEDICINE
Payer: COMMERCIAL

## 2022-11-09 LAB
ANION GAP SERPL CALCULATED.3IONS-SCNC: 5 MMOL/L (ref 3–14)
BASOPHILS # BLD AUTO: 0 10E3/UL (ref 0–0.2)
BASOPHILS NFR BLD AUTO: 0 %
BUN SERPL-MCNC: 25 MG/DL (ref 7–30)
CALCIUM SERPL-MCNC: 9.2 MG/DL (ref 8.5–10.1)
CHLORIDE BLD-SCNC: 116 MMOL/L (ref 94–109)
CO2 SERPL-SCNC: 24 MMOL/L (ref 20–32)
CREAT SERPL-MCNC: 0.89 MG/DL (ref 0.66–1.25)
EOSINOPHIL # BLD AUTO: 0.8 10E3/UL (ref 0–0.7)
EOSINOPHIL NFR BLD AUTO: 10 %
ERYTHROCYTE [DISTWIDTH] IN BLOOD BY AUTOMATED COUNT: 15 % (ref 10–15)
GFR SERPL CREATININE-BSD FRML MDRD: 81 ML/MIN/1.73M2
GLUCOSE BLD-MCNC: 100 MG/DL (ref 70–99)
HCT VFR BLD AUTO: 30.4 % (ref 40–53)
HGB BLD-MCNC: 9.2 G/DL (ref 13.3–17.7)
IMM GRANULOCYTES # BLD: 0 10E3/UL
IMM GRANULOCYTES NFR BLD: 0 %
LYMPHOCYTES # BLD AUTO: 0.6 10E3/UL (ref 0.8–5.3)
LYMPHOCYTES NFR BLD AUTO: 8 %
MCH RBC QN AUTO: 28.2 PG (ref 26.5–33)
MCHC RBC AUTO-ENTMCNC: 30.3 G/DL (ref 31.5–36.5)
MCV RBC AUTO: 93 FL (ref 78–100)
MONOCYTES # BLD AUTO: 0.6 10E3/UL (ref 0–1.3)
MONOCYTES NFR BLD AUTO: 7 %
NEUTROPHILS # BLD AUTO: 6.1 10E3/UL (ref 1.6–8.3)
NEUTROPHILS NFR BLD AUTO: 75 %
NRBC # BLD AUTO: 0 10E3/UL
NRBC BLD AUTO-RTO: 0 /100
PLATELET # BLD AUTO: 121 10E3/UL (ref 150–450)
POTASSIUM BLD-SCNC: 3.8 MMOL/L (ref 3.4–5.3)
RBC # BLD AUTO: 3.26 10E6/UL (ref 4.4–5.9)
SODIUM SERPL-SCNC: 145 MMOL/L (ref 133–144)
WBC # BLD AUTO: 8.2 10E3/UL (ref 4–11)

## 2022-11-09 PROCEDURE — 82310 ASSAY OF CALCIUM: CPT | Performed by: INTERNAL MEDICINE

## 2022-11-09 PROCEDURE — 92526 ORAL FUNCTION THERAPY: CPT | Mod: GN | Performed by: SPEECH-LANGUAGE PATHOLOGIST

## 2022-11-09 PROCEDURE — 250N000013 HC RX MED GY IP 250 OP 250 PS 637: Performed by: HOSPITALIST

## 2022-11-09 PROCEDURE — 999N000040 HC STATISTIC CONSULT NO CHARGE VASC ACCESS

## 2022-11-09 PROCEDURE — 36415 COLL VENOUS BLD VENIPUNCTURE: CPT | Performed by: INTERNAL MEDICINE

## 2022-11-09 PROCEDURE — 250N000013 HC RX MED GY IP 250 OP 250 PS 637: Performed by: INTERNAL MEDICINE

## 2022-11-09 PROCEDURE — 120N000001 HC R&B MED SURG/OB

## 2022-11-09 PROCEDURE — 999N000128 HC STATISTIC PERIPHERAL IV START W/O US GUIDANCE

## 2022-11-09 PROCEDURE — 99233 SBSQ HOSP IP/OBS HIGH 50: CPT | Performed by: HOSPITALIST

## 2022-11-09 PROCEDURE — 250N000011 HC RX IP 250 OP 636: Performed by: INTERNAL MEDICINE

## 2022-11-09 PROCEDURE — 97530 THERAPEUTIC ACTIVITIES: CPT | Mod: GO

## 2022-11-09 PROCEDURE — 97165 OT EVAL LOW COMPLEX 30 MIN: CPT | Mod: GO

## 2022-11-09 PROCEDURE — 85025 COMPLETE CBC W/AUTO DIFF WBC: CPT | Performed by: INTERNAL MEDICINE

## 2022-11-09 RX ORDER — BUMETANIDE 0.5 MG/1
0.5 TABLET ORAL DAILY
Status: DISCONTINUED | OUTPATIENT
Start: 2022-11-09 | End: 2022-11-12 | Stop reason: HOSPADM

## 2022-11-09 RX ADMIN — TAMSULOSIN HYDROCHLORIDE 0.4 MG: 0.4 CAPSULE ORAL at 20:15

## 2022-11-09 RX ADMIN — UMECLIDINIUM 1 PUFF: 62.5 AEROSOL, POWDER ORAL at 10:10

## 2022-11-09 RX ADMIN — CEFEPIME HYDROCHLORIDE 2 G: 2 INJECTION, POWDER, FOR SOLUTION INTRAVENOUS at 21:37

## 2022-11-09 RX ADMIN — FINASTERIDE 5 MG: 5 TABLET, FILM COATED ORAL at 09:58

## 2022-11-09 RX ADMIN — FAMOTIDINE 20 MG: 20 TABLET ORAL at 09:58

## 2022-11-09 RX ADMIN — DOXYCYCLINE 100 MG: 100 INJECTION, POWDER, LYOPHILIZED, FOR SOLUTION INTRAVENOUS at 11:10

## 2022-11-09 RX ADMIN — DOXYCYCLINE 100 MG: 100 INJECTION, POWDER, LYOPHILIZED, FOR SOLUTION INTRAVENOUS at 22:50

## 2022-11-09 RX ADMIN — FAMOTIDINE 20 MG: 20 TABLET ORAL at 20:15

## 2022-11-09 RX ADMIN — FERROUS SULFATE TAB 325 MG (65 MG ELEMENTAL FE) 325 MG: 325 (65 FE) TAB at 09:58

## 2022-11-09 RX ADMIN — CEFEPIME HYDROCHLORIDE 2 G: 2 INJECTION, POWDER, FOR SOLUTION INTRAVENOUS at 10:11

## 2022-11-09 RX ADMIN — METOPROLOL TARTRATE 25 MG: 25 TABLET, FILM COATED ORAL at 20:15

## 2022-11-09 RX ADMIN — BUMETANIDE 0.5 MG: 0.5 TABLET ORAL at 20:15

## 2022-11-09 RX ADMIN — FLUTICASONE FUROATE AND VILANTEROL TRIFENATATE 1 PUFF: 200; 25 POWDER RESPIRATORY (INHALATION) at 10:10

## 2022-11-09 ASSESSMENT — ACTIVITIES OF DAILY LIVING (ADL)
ADLS_ACUITY_SCORE: 42

## 2022-11-09 NOTE — PLAN OF CARE
Neuro: AO x4, forgetful  Tele/cardiac: pt had asymptomatic unsustained bradycardia at the beginning of the shift, EKG completed, showed SR with 1* AVB, PACs, R.BBB, MD aware, plan continue to monitor  Respiration: On 3L nasal canula  Activity: not yet OOB, decined PT/OT yesterday  Pain: denies  Drips: none  Drains/tubes: none  Skin: blanchable redness to coccyx, mepilex in place  GI/: continent, voiding in urinal, soft and bite sized diet, thin liquids  Aggression color: green  Isolation: none  Plan: wean oxygen as able

## 2022-11-09 NOTE — PLAN OF CARE
Goal Outcome Evaluation:      Plan of Care Reviewed With: patient    Overall Patient Progress: improvingOverall Patient Progress: improving    Assumed cares 3p-730p: Alert and oriented but forgetful. Hard of hearing. Up in chair this afternoon. Small bite size, thin liquids. Urinating small amounts at each time. BM +. No changes from dayshift. Discharge pending disposition.

## 2022-11-09 NOTE — PLAN OF CARE
Orientation: A&Ox3-4, forgetful of date occasionally   Vitals/Pain: VSS on 2L NC sating >90%   Lines/Drains: R hand PIV saline locked   Skin/Wounds: Coccyx redness, mepilex in place  LS: fine coarse crackles in bases   GI/: BS +, X1 BM this shift, oliguria - voiding  at a time, bladder scanning and monitoring output per protocol   Ambulation/Assist: x1-2 GB and walker   Plan: wean O2 to baseline (2L NC), continue to encourage ambulation, monitor urine retention, continue IV abx     Goal Outcome Evaluation:  Plan of Care Reviewed With: patient  Overall Patient Progress: improving   Outcome Evaluation: Pt at 2L NC (baseline), worked with pt and OT, now up in the chair. Diet advanced, tolerating well

## 2022-11-09 NOTE — PROGRESS NOTES
"   11/09/22 0931   Appointment Info   Signing Clinician's Name / Credentials (OT) Lin Reyes OTR/L   Living Environment   People in Home spouse   Current Living Arrangements house   Home Accessibility stairs to enter home   Number of Stairs, Main Entrance 1   Living Environment Comments Main level living   Self-Care   Usual Activity Tolerance good   Current Activity Tolerance fair   Equipment Currently Used at Home walker, rolling;cane, straight;raised toilet seat   Fall history within last six months yes   Number of times patient has fallen within last six months   (\"a couple\")   Activity/Exercise/Self-Care Comment Pt is unreliable historian and no visitors present. Pt reports at baseline he is independent in self-cares. Uses both cane and FWW. Baseline home O2 at night. Reports he sleeps in standard bed at home.   Instrumental Activities of Daily Living (IADL)   IADL Comments Pt is questionable historian. Reports spouse does cleaning, laundry, cooking. Reports he drives and manages his own meds. Unable to recall if he recently had home PT   General Information   Onset of Illness/Injury or Date of Surgery 11/06/22   Referring Physician Leland Trinh MD   Patient/Family Therapy Goal Statement (OT) Get stronger   Additional Occupational Profile Info/Pertinent History of Current Problem Per Hospitalist- 89-year-old male patient with history including COPD on chronic oxygen nocturnally; CHF (HFpEF); paroxysmal atrial fibrillation; aortic stenosis, status post TAVR; chronic kidney disease stage III; BPH; chronic dysphagia; history of PE and DVT; history of GI bleed felt to be related to colonic AVMs (08/2022); history of lung cancer status post right lower lobe resection (2019); and a recent hospitalization at [facility] (10/09/2022-10/17/2022) with issues including sepsis due to pneumonia with acute hypoxic respiratory failure.  He presented 11/6/2022 with the above issues presents after being found " unresponsive with his oxygen off and noted to be hypotensive with acute hypoxic respiratory failure.   Existing Precautions/Restrictions fall;oxygen therapy device and L/min  (3L)   Cognitive Status Examination   Orientation Status person;place  (disoriented to month.)   Follows Commands follows two-step commands;50-74% accuracy   Memory Deficit moderate deficit;short-term memory;long-term memory   Attention Deficit moderate deficit;focused/sustained attention;alternating attention;divided attention   Executive Function Deficit moderate deficit;judgment;problem-solving/reasoning;insight/awareness of deficits;information processing   Cognitive Status Comments Pt [place]. Tangential. Slowed processing speed.   Sensory   Sensory Comments Pt reports baseline severe neuropathy in feet and intermittent in hands   Pain Assessment   Patient Currently in Pain Yes, see Vital Sign flowsheet   Posture   Posture forward head position   Transfers   Transfers toilet transfer;shower transfer   Shower Transfer   [place] Level (Shower Transfer) minimum assist (75% patient effort)   Toilet Transfer   [place] Level (Toilet Transfer) minimum assist (75% patient effort)   Activities of Daily Living   BADL Assessment/Intervention lower body dressing;toileting;bathing;grooming   Bathing Assessment/Intervention   [place] Level (Bathing) moderate assist (50% patient effort)   Comment, (Bathing) per clinical judgment   Lower Body Dressing Assessment/Training   [place] Level (Lower Body Dressing) moderate assist (50% patient effort)   Grooming Assessment/Training   Comment, (Grooming) unable to complete standing due to SOB   Toileting   [place] Level (Toileting) minimum assist (75% patient effort)   Clinical Impression   Criteria for Skilled Therapeutic Interventions Met (OT) Yes, treatment indicated   OT Diagnosis Decline function   OT Problem List-Impairments impacting ADL problems related to;activity tolerance  impaired;balance;cognition;mobility;strength;sensory feedback   Assessment of Occupational Performance 5 or more Performance Deficits   Identified Performance Deficits dressing, grooming, toileting, bathing, functional mobility, IADLs   Planned Therapy Interventions (OT) ADL retraining;IADL retraining;balance training;cognition;strengthening;transfer training;progressive activity/exercise   Clinical Decision Making Complexity (OT) low complexity   Anticipated Equipment Needs Upon Discharge (OT) wheelchair;reacher;shower chair;raised toilet seat;walker, rolling   Risk & Benefits of therapy have been explained evaluation/treatment results reviewed;care plan/treatment goals reviewed;risks/benefits reviewed;current/potential barriers reviewed;participants voiced agreement with care plan;participants included;patient   OT Total Evaluation Time   OT Eval, Low Complexity Minutes (65385) 10   OT Goals   Therapy Frequency (OT) 5 times/wk   OT Predicted Duration/Target Date for Goal Attainment 11/19/22   OT Goals Hygiene/Grooming;Lower Body Dressing;Toilet Transfer/Toileting;Transfers;Cognition   OT: Hygiene/Grooming modified independent;while standing   OT: Lower Body Dressing Supervision/stand-by assist;including set-up/clothing retrieval   OT: Transfer Supervision/stand-by assist  (simulated step in shower transfer)   OT: Toilet Transfer/Toileting Supervision/stand-by assist;toilet transfer;cleaning and garment management;using adaptive equipment   OT: Cognitive Patient/caregiver will verbalize understanding of cognitive assessment results/recommendations as needed for safe discharge planning   Therapeutic Activities   Therapeutic Activity Minutes (60254) 25   Symptoms noted during/after treatment fatigue;shortness of breath   Treatment Detail/Skilled Intervention Upon arrival pt agreeable to therapy. Pt [place] and benefits from slowed rate of speech and needs repetition at times. Pt coughing up phlegm while semi-supine. Pt  encouraged to continue coughing. With RN permission, pt issued incentive spirometer. Semi-supine to sit with CGA and one cue for technique. Pt scooted closer to EOB with CGA for balance safety. Pt sat EOB for 20 minutes while room setup. Pt washed face and combed hair on EOB. Declined shaving. SIt to stand from EOB with CGA and  a cue for hand placement. Pt stood for 30 seconds with FWW. O2 sat decreased into low 80s despite RN increasing supplemental O2 and cues for pursed lip breathing. Stand to sit with CGA and cues for slow descent.Pt required 2 minute rest break. Sit to stand again with [place]. Pt transferred to chair with FWW with CGA and cues for thoracic extension. Stand to sit with CGA. Increased time to position pt for comfort. Pt left with alarm on, BLEs elevated, and RN present. /70.HR in mid 50s at rest. 3L supplemental O2   OT Discharge Planning   OT Plan Ambulate with WC follow and O2 tank. LB dressing. Toilet transfer.   OT Discharge Recommendation (DC Rec) Transitional Care Facility;home with assist;home with home care occupational therapy   OT Rationale for DC Rec Pt functioning below baseline and will benefit from continued skilled OT to maximize safety and independence. Primarily limited by impairments in strength, cognition, activity tolerance. Recommend TCU. If pt declines TCU would recommend hands on assist of 1 with self-cares and mobility, total assist with IADLs, home OT/PT/RN, use of raised toilet seat, shower chair, transport chair   OT Brief overview of current status Tolerating minimal activiites in standing due to SOB. Tangential   Total Session Time   Timed Code Treatment Minutes 25   Total Session Time (sum of timed and untimed services) 35

## 2022-11-09 NOTE — PLAN OF CARE
[id number]    A&O mostly, able to answer orientation questions however, forgetful/gets events/timing mixed up. VSS on 3L O2 NC.  IMC was discontinued today.   Nursing got pt up briefly to put pulsate mattress in place, up with assist x2 and gait belt. Per PT pt refused to get up with them for PT eval. Encouraged pt to work with PT tomorrow, encouraged activity. Pt buttocks very red, blanchable, however, appears to be a little bruised- monitor closely and may need to consider WOC consult. PIP- cleansed with garcia cleanser, dry trisha cloths, sacral mepilex in place, encouraged side to side as able, however, does need to be upright for aspiration precautions.  LS clear, diminished at bases. BS+, no BM today, last known BM 11/6.  Suazo catheter removed at 1233, bladder scan at 1855 was only 291cc, encouraged PO  Pt advanced to thin liquids.

## 2022-11-09 NOTE — PROGRESS NOTES
[facility]    Medicine Progress Note - Hospitalist Service    Date of Admission:  11/6/2022    Assessment & Plan   Mr. Hermilo Velasquez is a 89-year-old male patient with history including COPD on chronic oxygen nocturnally; CHF (HFpEF); paroxysmal atrial fibrillation; aortic stenosis, status post TAVR; chronic kidney disease stage III; BPH; chronic dysphagia; history of PE and DVT; history of GI bleed felt to be related to colonic AVMs (08/2022); history of lung cancer status post right lower lobe resection (2019); and a recent hospitalization at [facility] (10/09/2022-10/17/2022) with issues including sepsis due to pneumonia with acute hypoxic respiratory failure.  He presented 11/6/2022 with the above issues presents after being found unresponsive with his oxygen off and noted to be hypotensive with acute hypoxic respiratory failure.    On initial evaluation, temperature 97.9, heart rate 120, blood pressure 66/40, respiratory rate 20, oxygen saturations were in the 80s on room air.  He was placed on oxygen up to 10 liters.  Laboratory evaluation showed white count 12.6, hemoglobin 10.4, platelets 172; BMP showed notable for a creatinine 1.77 (of note, creatinine was normal on 10/17/2022) NTP-BNP 5267; lactate 2.3, procalcitonin 1.23; TSH 4.36, but free T4 was normal at 0.97; ABG showed a pH of 7.23, pCO2 of 65, pO2 is 40; COVID, influenza  RSV testing were negative; UA negative.  Chest x-ray showed previously seen infiltrates nearly resolved with no definite new infiltrates.          Acute hypoxic respiratory failure with hypotension, suspect septic shock due to aspiration pneumonia and also related to being off home oxygen.  * Appears that home nocturnal O2 fell off during the night PTA. Initial presentation as above. Started on cefepime and doxycycline on admit. BC's 11/6 NGTD. Bilateral lower extremity US 11/6 negative for DVT.  * 11/7: BP's and O2 requirements improved. Echo 11/7  showed LVEF 55-60%, no regional wall motion abnormalities; RV moderate dilated, RV systolic function mildly reduced; prosthetic AV not well visualized, mean gradient 19 mmHg. CT chest 11/7 showed stable to minimally improved right pleural effusion and associated probable compressive atelectasis versus less likely infiltrate; new minimal left effusion with associated compressive atelectasis and/or infiltrate.   * 11/8: Down to 2L/RA.  - Continue O2, wean as able.  - Continue doxycycline (started 11/6) and cefepime (started 11/6).  - Continue to monitor cultures.  - Dysphagia management as below.     COPD.    - Continue O2 at night and PRN.  - Continue fluticasone-vilanterol, umeclidinium and PRN albuterol.     Chronic dysphagia.  * Concern for aspiration pneumonia as above. SLP consulted on admit.  * On 11/7, patient expressed strong dislike for thickened liquids. Palliative care consulted 11/7 to see him to help with goals of care discussions around swallowing/diet.    - Continue dysphagia level 6 diet with mildly thickened liquids, advance diet as able per SLP; may advance for pt comfort knowing aspiration risks.  - Continue aspiration precautions.  - my colleague d/w Palliative care 11/8; from speaking with pt who was more clear and less [id number], he definitely wants to have thin liquids and does not like modified diet; as such, do not feel we need Palliative evaluation for now and will cancel consult; appreciate help.     Altered mental status, suspect due to metabolic and infectious/septic encephalopathy.  * Initial presentation as above.  * On 11/7, mental status improved.  * On 11/8, mental status much improved compared with admit.  - Continue to treat other issues as noted.  - Re-orient as needed.  - Maintain normal day/night, sleep/wake cycles.  - Minimize sedating medications as able.     Acute kidney injury on CKD3, suspect prerenal. - now resolved  * Cr 1.77 on admit. Previously normal 10/2022.  *  Cr normalized 11/8.  Plan  - restart PTA bumetanide today  - Monitor BMP.     Hypernatremia due to hypertonic fluids and dehydration.  * Sodium normal on admit.  * Sodium up to 146 on 11/7. IVF's changed to 1/2 NS: now discontinued  Plan  - Continue to encourage oral rehydration.     Thrombocytopenia, unclear etiology, possibly from infection/sepsis, medication effect or from other cause.  * Platelets normal on admit 11/6.  * Platelets decreased 11/7.        Recent Labs   Lab 11/08/22  0506 11/07/22 0519 11/06/22  1007   * 123* 172   - Monitor CBC.  - Consider platelet transfusion if needs a procedure and less than 50,000; or if less than 10,000.     Congestive heart failure (LV diastolic, RV systolic)  Aortic stenosis, status post TAVR.   [PTA BP meds/diuretics: bumetanide 0.5 mg daily; metoprolol 25 mg BID.]  * Echocardiogram from 08/2020 showed left ventricular EF of 50% to 55%; right ventricle appeared moderate to severely enlarged in certain views and function could not be assessed due to poor image quality; prosthetic aortic valve was not well visualized.  * Echo 11/7 showed LVEF 55-60%, no regional wall motion abnormalities; RV moderate dilated, RV systolic function mildly reduced; prosthetic AV not well visualized, mean gradient 19 mmHg.  * Metoprolol restarted at lower dose 11/7.  Plan  - Increase metoprolol 12.5 --> 25 mg BID.    - Continue to hold PTA bumetanide for now.    - Monitor I's and O's, daily weights.     Paroxysmal atrial fibrillation.    * The patient has been off apixaban since GI bleed in August 2022.  - Restart metoprolol 25 mg BID.     Weakness and physical deconditioning due to multiple acute and chronic medical issues.  - Continue PT and OT.     Dyslipidemia.   - Continue atorvastatin at discharge.     Chronic anemia.    * Hgb stable at 10.4 on admit.  * Hgb dropped to 8.8 11/8, suspect dilutional.        Recent Labs   Lab 11/08/22  0506 11/07/22 0519 11/06/22  1007   HGB 8.8*  9.3* 10.4*   - Continue iron supplement.  - Monitor CBC.  - Consider prbc transfusion if hgb </= 7.0 or if significant bleeding with hemodynamic instability or if symptomatic.     GERD.    - Continue famotidine.     BPH.    * Suazo placed in ED on admit.  * Suazo.discontinued  - PTA finasteride restarted  - Continue tamsulosin.     H/o GI bleed in 08/2022, possibly due to colonic AVM's.  * Seen by Chadd BLAKE at that time. Required blood transfusion. EGD during the hospitalization was negative; colonoscopy deferred due to agitated delirium and clinical stability.  - Monitor clinically.       Diet: Combination Diet Easy to Chew (level 7); Thin Liquids (level 0) (No straw, chin tuck, small sips); No Straws  Room Service    DVT Prophylaxis: Pneumatic Compression Devices  Suazo Catheter: Not present  Central Lines: None  Cardiac Monitoring: None  Code Status: Full Code      Disposition Plan      Expected Discharge Date: 11/11/2022        Discharge Comments: 11/7 2.5 L O2  11/8 converted to NSR, needs PT  11/9 refusing PT        The patient's care was discussed with the Patient.    Layla Faustin MD  Hospitalist Service  [facility]  Securely message with the Vocera Web Console (learn more here)  Text page via [employer] Paging/Directory         Clinically Significant Risk Factors         # Hypernatremia: Highest Na = 147 mmol/L (Ref range: 136-145) in last 2 days, will monitor as appropriate      # Hypoalbuminemia: Lowest albumin = 2.5 g/dL (Ref range: 3.5-5.2) at 11/6/2022 10:07 AM, will monitor as appropriate   # Thrombocytopenia: Lowest platelets = 116 (Ref range: 150-450) in last 2 days, will monitor for bleeding                ______________________________________________________________________    Interval History   Patient sitting in chair, clinically improved.   No complaints at this time.   Likely discharge in am    Data reviewed today: I reviewed all medications, new labs and imaging results  over the last 24 hours. I personally reviewed no images or EKG's today.    Physical Exam   Vital Signs: Temp: 97.4  F (36.3  C) Temp src: Oral BP: 126/70 Pulse: 54   Resp: 19 SpO2: 95 % O2 Device: Nasal cannula Oxygen Delivery: 3 LPM  Weight: 158 lbs 11.7 oz  General Appearance: Well appearing for stated age.  Respiratory: CTAB, no rales or ronchi  Cardiovascular: S1, S2 normal, no murmurs  GI: non-tender on palpation, BS present      Data   Recent Labs   Lab 11/09/22  0535 11/08/22  0506 11/07/22  0519 11/06/22  1007   WBC 8.2 8.7 13.0* 12.6*   HGB 9.2* 8.8* 9.3* 10.4*   MCV 93 93 94 95   * 116* 123* 172   * 147* 146* 142   POTASSIUM 3.8 3.8 3.8 4.8   CHLORIDE 116* 117* 117* 111*   CO2 24 24 26 26   BUN 25 24 30 29   CR 0.89 0.98 1.33* 1.77*  1.9*   ANIONGAP 5 6 3 5   AMRITA 9.2 8.7 8.0* 8.0*   * 92 118* 185*   ALBUMIN  --   --   --  2.5*   PROTTOTAL  --   --   --  6.5*   BILITOTAL  --   --   --  0.4   ALKPHOS  --   --   --  87   ALT  --   --   --  10   AST  --   --   --  16     No results found for this or any previous visit (from the past 24 hour(s)).

## 2022-11-10 ENCOUNTER — APPOINTMENT (OUTPATIENT)
Dept: OCCUPATIONAL THERAPY | Facility: CLINIC | Age: 87
DRG: 871 | End: 2022-11-10
Payer: COMMERCIAL

## 2022-11-10 LAB
ANION GAP SERPL CALCULATED.3IONS-SCNC: 7 MMOL/L (ref 3–14)
BUN SERPL-MCNC: 20 MG/DL (ref 7–30)
CALCIUM SERPL-MCNC: 9.2 MG/DL (ref 8.5–10.1)
CHLORIDE BLD-SCNC: 109 MMOL/L (ref 94–109)
CO2 SERPL-SCNC: 27 MMOL/L (ref 20–32)
CREAT SERPL-MCNC: 0.75 MG/DL (ref 0.66–1.25)
GFR SERPL CREATININE-BSD FRML MDRD: 86 ML/MIN/1.73M2
GLUCOSE BLD-MCNC: 114 MG/DL (ref 70–99)
GLUCOSE BLDC GLUCOMTR-MCNC: 101 MG/DL (ref 70–99)
POTASSIUM BLD-SCNC: 3.7 MMOL/L (ref 3.4–5.3)
SODIUM SERPL-SCNC: 143 MMOL/L (ref 133–144)

## 2022-11-10 PROCEDURE — 250N000013 HC RX MED GY IP 250 OP 250 PS 637: Performed by: INTERNAL MEDICINE

## 2022-11-10 PROCEDURE — 80048 BASIC METABOLIC PNL TOTAL CA: CPT | Performed by: HOSPITALIST

## 2022-11-10 PROCEDURE — 99233 SBSQ HOSP IP/OBS HIGH 50: CPT | Performed by: HOSPITALIST

## 2022-11-10 PROCEDURE — 250N000011 HC RX IP 250 OP 636: Performed by: INTERNAL MEDICINE

## 2022-11-10 PROCEDURE — 120N000001 HC R&B MED SURG/OB

## 2022-11-10 PROCEDURE — 999N000127 HC STATISTIC PERIPHERAL IV START W US GUIDANCE

## 2022-11-10 PROCEDURE — 999N000040 HC STATISTIC CONSULT NO CHARGE VASC ACCESS

## 2022-11-10 PROCEDURE — 36415 COLL VENOUS BLD VENIPUNCTURE: CPT | Performed by: HOSPITALIST

## 2022-11-10 PROCEDURE — 250N000013 HC RX MED GY IP 250 OP 250 PS 637: Performed by: HOSPITALIST

## 2022-11-10 PROCEDURE — 97530 THERAPEUTIC ACTIVITIES: CPT | Mod: GO

## 2022-11-10 RX ADMIN — TAMSULOSIN HYDROCHLORIDE 0.4 MG: 0.4 CAPSULE ORAL at 20:11

## 2022-11-10 RX ADMIN — FAMOTIDINE 20 MG: 20 TABLET ORAL at 20:11

## 2022-11-10 RX ADMIN — FERROUS SULFATE TAB 325 MG (65 MG ELEMENTAL FE) 325 MG: 325 (65 FE) TAB at 09:55

## 2022-11-10 RX ADMIN — CEFEPIME HYDROCHLORIDE 2 G: 2 INJECTION, POWDER, FOR SOLUTION INTRAVENOUS at 22:09

## 2022-11-10 RX ADMIN — FLUTICASONE FUROATE AND VILANTEROL TRIFENATATE 1 PUFF: 200; 25 POWDER RESPIRATORY (INHALATION) at 09:56

## 2022-11-10 RX ADMIN — DOXYCYCLINE 100 MG: 100 INJECTION, POWDER, LYOPHILIZED, FOR SOLUTION INTRAVENOUS at 12:55

## 2022-11-10 RX ADMIN — METOPROLOL TARTRATE 25 MG: 25 TABLET, FILM COATED ORAL at 20:25

## 2022-11-10 RX ADMIN — FAMOTIDINE 20 MG: 20 TABLET ORAL at 09:55

## 2022-11-10 RX ADMIN — BUMETANIDE 0.5 MG: 0.5 TABLET ORAL at 09:55

## 2022-11-10 RX ADMIN — FINASTERIDE 5 MG: 5 TABLET, FILM COATED ORAL at 09:55

## 2022-11-10 RX ADMIN — UMECLIDINIUM 1 PUFF: 62.5 AEROSOL, POWDER ORAL at 09:56

## 2022-11-10 RX ADMIN — CEFEPIME HYDROCHLORIDE 2 G: 2 INJECTION, POWDER, FOR SOLUTION INTRAVENOUS at 11:45

## 2022-11-10 ASSESSMENT — ACTIVITIES OF DAILY LIVING (ADL)
ADLS_ACUITY_SCORE: 48
ADLS_ACUITY_SCORE: 46
DRESSING/BATHING_DIFFICULTY: YES
ADLS_ACUITY_SCORE: 48
DIFFICULTY_EATING/SWALLOWING: YES
ADLS_ACUITY_SCORE: 42
ADLS_ACUITY_SCORE: 46
ADLS_ACUITY_SCORE: 42
CHANGE_IN_FUNCTIONAL_STATUS_SINCE_ONSET_OF_CURRENT_ILLNESS/INJURY: YES
WALKING_OR_CLIMBING_STAIRS_DIFFICULTY: YES
ADLS_ACUITY_SCORE: 42
EQUIPMENT_CURRENTLY_USED_AT_HOME: WALKER, STANDARD
DOING_ERRANDS_INDEPENDENTLY_DIFFICULTY: YES
TOILETING_ISSUES: YES
ADLS_ACUITY_SCORE: 48
FALL_HISTORY_WITHIN_LAST_SIX_MONTHS: YES
ADLS_ACUITY_SCORE: 42
ADLS_ACUITY_SCORE: 42
CONCENTRATING,_REMEMBERING_OR_MAKING_DECISIONS_DIFFICULTY: YES
ADLS_ACUITY_SCORE: 42
ADLS_ACUITY_SCORE: 46
WEAR_GLASSES_OR_BLIND: YES

## 2022-11-10 NOTE — PLAN OF CARE
Neuro: AO x4, forgetful, intermittently disoriented to time and situation  Tele/cardiac:  SR/SB  with 1* AVB, PACs, BBB,   Respiration: On 3L nasal canula  Activity:A1-2 GBW  Pain: denies  Drips: none  Drains/tubes: none  Skin: blanchable redness to coccyx, mepilex in place  GI/: continent, voiding in urinal, easy to chew diet, thin liquids  Aggression color: green  Isolation: none  Plan: discharge to TCU vs home with home care pending

## 2022-11-10 NOTE — PROGRESS NOTES
CLINICAL NUTRITION SERVICES  -  ASSESSMENT NOTE      Recommendations Ordered by Registered Dietitian (RD):   Supplements PRN  Room Service w/ Assist for meal ordering    Malnutrition:   % Weight Loss:  None noted  % Intake:  No decreased intake noted  Subcutaneous Fat Loss:  Orbital region moderate depletion --> will not use given only 1 indicator   Muscle Loss:  Clavicle bone region mil depletion, Acromion bone region mild depletion and Dorsal hand region mild depletion --> may be age-related   Fluid Retention:  None noted    Malnutrition Diagnosis: Patient does not meet two of the above criteria necessary for diagnosing malnutrition, but is at risk for malnutrition        REASON FOR ASSESSMENT  Hermilo Velasquez is a 89 year old male seen by Registered Dietitian for Admission Nutrition Risk Screen for positive -  Have you recently lost weight without trying? Yes (unsure amount)  Have you been eating poorly because of a decreased appetite? Yes      NUTRITION HISTORY  - Information obtained from chart review. Patient seen at bedside this afternoon but hx limited d/t forgetfulness.   - Patient known from prior admissions over the past year. He lives with his spouse at baseline and eats 3 meals/day. Spouse does most all of the meal preparation and cooking.   - Most recently was assessed in September. Calorie counts were performed and patient met < 50% of his nutrition needs. Was diagnosed with severe malnutrition at that time with weight loss and decreased oral intake. Historically will accept supplements for a few days and then declines.   - On admission nutrition risk screen patient endorsed decreased appetite and recent weight loss. During visit this afternoon patient gives conflicting information that his appetite has been good at home prior to this admission.   - No known food allergies noted.       CURRENT NUTRITION ORDERS  Diet Order:     Easy to Chew + Thin Liquids  Room Service w/ Assist      Current  "Intake/Tolerance:  Patient had breakfast/lunch tray at bedside during visit - was eating an omelet and said it tasted good.       NUTRITION FOCUSED PHYSICAL ASSESSMENT FOR DIAGNOSING MALNUTRITION)  Yes         Observed:    Muscle wasting (refer to documentation in Malnutrition section) and Subcutaneous fat loss (refer to documentation in Malnutrition section) -- some loss may be age-related    ANTHROPOMETRICS  Height: 5' 8\"  Weight: 162 lbs 14.72 oz  Body mass index is 24.77 kg/m .  Weight Status:  Normal BMI  Weight History: Overall stable past 5 months, trending back up in the past couple months.   Wt Readings from Last 20 Encounters:   11/10/22 73.9 kg (162 lb 14.7 oz)   10/16/22 69.1 kg (152 lb 5.4 oz)   09/15/22 71.2 kg (157 lb)   09/14/22 71.2 kg (157 lb)   09/12/22 71.2 kg (157 lb)   09/09/22 71.5 kg (157 lb 11.2 oz)   09/06/22 68.7 kg (151 lb 8 oz)   08/23/22 75.3 kg (165 lb 14.4 oz)   08/15/22 76.2 kg (168 lb)   08/11/22 75.4 kg (166 lb 4.8 oz)   08/10/22 74.2 kg (163 lb 8 oz)   08/07/22 73 kg (161 lb)   08/04/22 71.2 kg (157 lb)   08/01/22 71.1 kg (156 lb 12.8 oz)   07/31/22 71.4 kg (157 lb 4.8 oz)   07/26/22 75.8 kg (167 lb 1.7 oz)   06/27/22 70.8 kg (156 lb)   06/08/22 72.6 kg (160 lb)   06/07/22 72.5 kg (159 lb 12.8 oz)   06/01/22 71.7 kg (158 lb)         LABS  Labs reviewed  Na 145 (H)    MEDICATIONS  Medications reviewed  Bumex  Ferrous sulfate 325 mg/day       ASSESSED NUTRITION NEEDS PER APPROVED PRACTICE GUIDELINES:    Dosing Weight 72 kg (11/9)  Estimated Energy Needs: 0285-9858 kcals (25-30 Kcal/Kg)  Justification: maintenance  Estimated Protein Needs: 70-85 grams protein (1-1.2 g pro/Kg)  Justification: preservation of lean body mass  Estimated Fluid Needs: 6779-2892  mL (1 mL/Kcal)  Justification: maintenance      NUTRITION DIAGNOSIS:  Predicted inadequate nutrient intake (protein-calorie) related to tolerating diet with good appetite at present but with potential for decreased oral intake " pending medical course, LOS, menu fatigue      NUTRITION INTERVENTIONS  Recommendations / Nutrition Prescription  Diet per SLP  Supplements PRN  Room Service w/ Assist for meal ordering       Implementation  Nutrition education: No education needs assessed at this time  Collaboration and Referral of Nutrition care - nutrition associate to see for meal ordering       Nutrition Goals  Patient will consume % nutritionally adequate meals TID       MONITORING AND EVALUATION:  Progress towards goals will be monitored and evaluated per protocol and Practice Guidelines      Dana Olson RD, LD

## 2022-11-10 NOTE — PLAN OF CARE
Goal Outcome Evaluation:       Plan of Care Reviewed With: patient/wife     Overall Patient Progress: improvingOverall Patient Progress: declining today     Lethargic this morning and part of the afternoon, disoriented to situation and time, forgetful. Hard of hearing. Not able to keep meaningful conversation but follows simple commands. Up in chair this evening with assist of 1 and walker. Dysphagia L7 with thin liquids (per patient preference). Poor PO today. Voiding into urinal and also incontinent. Excoriated buttocks. Tele SR-SB. Recs for TCU unless improves.

## 2022-11-10 NOTE — CONSULTS
Care Management Initial Consult    General Information  Assessment completed with: Spouse or significant other,    Type of CM/SW Visit: Initial Assessment    Primary Care Provider verified and updated as needed:     Readmission within the last 30 days:        Reason for Consult: discharge planning  Advance Care Planning:            Communication Assessment  Patient's communication style: spoken language (English or Bilingual)    Hearing Difficulty or Deaf: yes        Cognitive  Cognitive/Neuro/Behavioral: .WDL except  Level of Consciousness: somnolent  Arousal Level: arouses to voice  Orientation: disoriented to, situation, time  Mood/Behavior: uncooperative  Best Language: 0 - No aphasia  Speech: spontaneous, slow    Living Environment:   People in home: spouse     Current living Arrangements: house      Able to return to prior arrangements:         Family/Social Support:  Care provided by: self  Provides care for: no one  Marital Status:   Wife, Children          Description of Support System: Supportive, Involved    Support Assessment: Adequate family and caregiver support    Current Resources:   Patient receiving home care services: Yes  Skilled Home Care Services: Physicial Therapy  Community Resources: Home Care  Equipment currently used at home: walker, rolling, cane, straight, raised toilet seat  Supplies currently used at home:      Employment/Financial:  Employment Status: retired        Financial Concerns: No concerns identified   Referral to Financial Worker: No       Lifestyle & Psychosocial Needs:  Social Determinants of Health     Tobacco Use: Medium Risk     Smoking Tobacco Use: Former     Smokeless Tobacco Use: Never     Passive Exposure: Not on file   Alcohol Use: Not on file   Financial Resource Strain: Not on file   Food Insecurity: Not on file   Transportation Needs: Not on file   Physical Activity: Not on file   Stress: Not on file   Social Connections: Not on file   Intimate Partner  Violence: Not on file   Depression: Not at risk     PHQ-2 Score: 0   Housing Stability: Not on file       Functional Status:  Prior to admission patient needed assistance:              Mental Health Status:  Mental Health Status: No Current Concerns       Chemical Dependency Status:  Chemical Dependency Status: No Current Concerns             Values/Beliefs:  Spiritual, Cultural Beliefs, Caodaism Practices, Values that affect care: no               Additional Information:  SW reviewed chart. OT recommending TCU. PT pending. Pt discharged from [facility] 9/8 to [facility] and discharged 10/17 home with [facility]. SW called and spoke with pt's spouse. Her goal is for pt to return home. Noted he is weak and needing assist of two. She reports her children can help. She is concerned about insurance coverage, but reassured her pt would be covered under his [employer] plan and long as TCU is medially necessary. Per spouse, pt was previously independent at home. He uses O2 at night and walks with a cane. One episode of home PT left. SW following for discharge planning. Will re-approach spouse, as appropriate.     PIPO Chase, LGSW  [phone] Desk phone  [phone] Cell/text (Preferred)  [facility]

## 2022-11-10 NOTE — PROGRESS NOTES
Antimicrobial Stewardship Team Note    Antimicrobial Stewardship Program - A joint venture between [facility] and [facility] Consult[facility] [employer]icians to optimize antibiotic management.     Patient: Hermilo Velasquez  MRN: [id number]  Allergies: Omeprazole, Pantoprazole, Prevacid [lansoprazole], Lasix [furosemide], Lidocaine, Penicillin g, and Penicillins    Brief Summary: Hermilo Velasquez is a 89 year old male admitted on 11/6/22 due to being found unresponsive and hypoxic, suspected to have septic shock possibly from a respiratory source. PMH significant for COPD, HFpEF, atrial fibrillation, aortic stenosis s/p TAVR, CKD stage 3, hx of PE and DVT, lung cancer. Of note, patient was recently hospitalized at [facility] (10/9/22-10/17/22) w/ sepsis due to pneumonia w/ acute hypoxic respiratory failure.     On admission, patient was afebrile, tachycardic, hypotensive, had an elevated WBC at 12.6, a procalcitonin level of 1.43 and lactate of 2.3, requiring oxygen support. CXR showed only the previously seen infiltrates from prior admission as noted above w/ these being nearly resolved and no new infiltrates present. Cefepime and doxycycline were initiated and the patient is currently on day 5 of therapy for both agents. WBC has trended down to 8.2 as of 11/9 and patient has remained afebrile throughout hospitalization, with hypotension and tachycardia appearing to be improved. Patient's oxygen has been able to be weaned w/ current needs being 2 LPM via nasal cannula.    CXR from 11/6 noted no definite new infiltrates and chest CT from 11/7 noted stable to minimally improved right pleural effusion and associated probably compressive atelectasis versus less likely infiltrate w/ new minimal left effusion with associated compressive atelectasis and/or infiltrate. COVID, RSV, influenza all negative from 11/6, w/ blood cultures NGTD.       Active Anti-infective Medications   (From admission, onward)                  Start     Stop    11/07/22 1000  ceFEPIme  2 g,   Intravenous,   EVERY 24 HOURS        Sepsis        --    11/06/22 2230  doxycycline  100 mg,   Intravenous,   EVERY 12 HOURS        Community Acquired Pneumonia        --                  Assessment: Acute hypoxic respiratory failure w/ suspicion of septic shock  Patient presented with acute hypoxic respiratory failure w/ suspicion of septic shock due to possible respiratory source. Due to this, patient was started on broad spectrum antibiotics and is currently on day 5 of cefepime and doxycycline. Imaging results do not indicate the presence of new infiltrates and the patient's overall clinical picture has been improving over the course of hospital stay. In general, CAP is adequately treated after 5 days of therapy, so in this patient's case, recommend stopping cefepime and doxycycline after today's doses on 11/10 are administered.    Recommendations:  Stop cefepime and doxycycline after doses administered on 11/10    Discussed with ID Staff: Dr. Venkatesh Rhodes MD and Brenda Olson, PharmD, [facility]  [facility]    Vital Signs/Clinical Features:  Vitals         11/08 0700  11/09 0659 11/09 0700  11/10 0659 11/10 0700  11/10 1206   Most Recent      Temp ( F) 97.3 -  98.7    97.3 -  97.9      98.1     98.1 (36.7) 11/10 0746    Pulse 53 -  103    53 -  74      57     57 11/10 0959    Resp 16 -  29    15 -  19      16     16 11/10 0746    /52 -  130/66    98/45 -  141/59      113/53     113/53 11/10 0746    SpO2 (%) 93 -  96    95 -  97    93 -  94     93 11/10 0959            Labs  Estimated Creatinine Clearance: 57.7 mL/min (based on SCr of 0.89 mg/dL).  Recent Labs   Lab Test 10/17/22  0648 11/06/22  1007 11/07/22  0519 11/08/22  0506 11/09/22  0535   CR 0.72 1.77*  1.9* 1.33* 0.98 0.89       Recent Labs   Lab Test 08/15/20  1450 08/16/20  0829 08/28/20  1145 09/10/20  1425 09/13/20  1256 09/13/20  1800 10/23/20  1617 10/24/20  0037 04/13/21  2031  04/14/21  0506 04/14/21  0604 10/12/22  0521 10/13/22  0616 11/06/22  1007 11/07/22  0519 11/08/22  0506 11/09/22  0535   WBC 9.2   < > 9.0  --  32.3*   < > 8.0   < > 24.6* 17.6*   < > 9.6 7.4 12.6* 13.0* 8.7 8.2   ANEU 6.1  --  6.1  --  27.5*  --  5.5  --  21.9* 15.2*  --   --   --   --   --   --   --    ALYM 1.2  --  1.1  --  1.0  --  0.9  --  0.8 0.8  --   --   --   --   --   --   --    KODY 1.0  --  0.9  --  3.6*  --  1.0  --  1.7* 1.2  --   --   --   --   --   --   --    AEOS 0.8*  --  0.9*  --  0.3  --  0.7  --  0.0 0.2  --   --   --   --   --   --   --    HGB 7.4*   < > 8.5*   < > 8.4*   < > 6.6*   < > 7.0* 5.8*   < > 7.9* 8.2* 10.4* 9.3* 8.8* 9.2*   HCT 24.9*   < > 29.1*  --  27.9*   < > 22.5*   < > 25.3* 21.2*   < > 25.7* 26.5* 34.7* 30.7* 28.3* 30.4*   MCV 80   < > 87  --  91   < > 89   < > 81 82   < > 96 92 95 94 93 93      < > 248  --  223   < > 279   < > 290 244   < > 173 180 172 123* 116* 121*    < > = values in this interval not displayed.       Recent Labs   Lab Test 04/11/22  0744 08/23/22  1712 08/27/22  2245 08/31/22  0829 10/10/22  0700 10/11/22  0518 10/12/22  0521 11/06/22  1007   BILITOTAL 0.3 0.2 0.6 0.4 1.0  --   --  0.4   ALKPHOS 78 112 97 80 66  --   --  87   ALBUMIN 2.2* 2.6* 2.3* 2.1* 2.3* 2.1* 2.2* 2.5*   AST 17 24 15 10 24  --   --  16   ALT 10 22 12 11 12  --   --  10       Recent Labs   Lab Test 02/21/18  1124 01/10/19  1731 05/13/19  1128 05/14/19  1021 05/15/19  0700 05/16/19  0641 12/22/19  1339 04/14/21  0506 04/10/22  2033 04/11/22  0744 07/26/22  1024 10/06/22  1526 10/09/22  1353 10/09/22  1410 10/11/22  2047 10/12/22  0521 10/13/22  0615 11/06/22  1001 11/06/22  1007 11/06/22  1234 11/06/22  1238 11/07/22  0519 11/08/22  0506   PCAL  --   --   --   --   --   --    < > 1.63  --  1.01*  --   --  7.49*  --   --   --  3.09*  --  1.23* 1.43*  --   --   --    LACT  --   --    < >  --   --   --    < >  --    < >  --    < >  --   --    < > 1.1 0.7  --  2.3*  --   --  1.1 0.5*  0.5*   CRP 12.0*  --   --  182.0* 184.0* 127.0*  --   --   --   --   --  11.40*  --   --   --   --   --   --   --   --   --   --   --    SED 32* 33*  --  68* 83* 89*  --   --   --   --   --  43*  --   --   --   --   --   --   --   --   --   --   --     < > = values in this interval not displayed.       Recent Labs   Lab Test 11/24/15  1036   VANCOMYCIN 19.8       Culture Results:  7-Day Micro Results       Procedure Component Value Units Date/Time    Blood Culture Wrist, Left [[id number]]  (Normal) Collected: 22 1217    Order Status: Completed Lab Status: Preliminary result Updated: 22 1631    Specimen: Blood from Wrist, Left      Culture No growth after 2 days    Blood Culture Peripheral Blood     Order Status: Canceled Lab Status: No result     Specimen: Peripheral Blood     Urine Culture     Order Status: Canceled Lab Status: No result     Specimen: Urine, Suazo Catheter     Blood Culture Peripheral Blood [[id number]]  (Normal) Collected: 22 1007    Order Status: Completed Lab Status: Preliminary result Updated: 22 1316    Specimen: Peripheral Blood      Culture No growth after 3 days    Blood Culture Peripheral Blood [[id number]]     Order Status: Canceled Lab Status: No result     Specimen: Peripheral Blood             Recent Labs   Lab Test 04/10/22  2236 22  1501 22  1322 10/09/22  1554 22  1248   URINEPH 5.5 5.5 5.5 6.0 5.0   NITRITE Negative Negative Negative Negative Negative   LEUKEST Negative Negative Trace* Small* Negative   WBCU 13* 4 0-5 21* 1                         Imaging: Echocardiogram Complete    Result Date: 2022  [id number]^MAJO^ANDREY^NIXON  [facility] Echocardiography Laboratory [address] [place] [id number]  Name: CLYDE RODRIGUEZ MRN: [id number] : 1932 Study Date: 2022 07:59 AM Age: 89 yrs Gender: Male Patient Location: [facility] Reason For Study: Dyspnea Ordering Physician:  ANDREY KASPER Performed By: Davida Martinez RDCS  BSA: 1.9 m2 Height: 68 in Weight: 164 lb HR: 102 BP: 99/68 mmHg ______________________________________________________________________________ Procedure Complete Portable Echo Adult. Optison (NDC #[id number]) given intravenously. ______________________________________________________________________________ Interpretation Summary  Left ventricular systolic function is normal. The visual ejection fraction is 55-60%. No regional wall motion abnormalities noted. 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 The prosthetic aortic valve is not well visualized. The mean AoV pressure gradient is 19mmHg. The right ventricle is moderately dilated. The right ventricular systolic function is mildly reduced. Compared to 8/22, mean gradient across AV prosthesis has increased from 14 to 19mm.  The study was technically difficult. ______________________________________________________________________________ Left Ventricle The left ventricle is normal in size. There is normal left ventricular wall thickness. Left ventricular systolic function is normal. The visual ejection fraction is 55-60%. Left ventricular diastolic function is indeterminate. No regional wall motion abnormalities noted. Septal motion is consistent with conduction abnormality.  Right Ventricle The right ventricle is moderately dilated. The right ventricular systolic function is mildly reduced.  Atria Normal left atrial size. The right atrium is severely dilated. 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  Mitral Valve There is mild to moderate mitral annular calcification. There is trace to mild mitral regurgitation.  Tricuspid Valve There is trace to mild tricuspid regurgitation. The right ventricular systolic pressure is approximated at 19.6 mmHg plus the right atrial pressure. IVC diameter >2.1 cm collapsing <50% with sniff suggests a high RA pressure estimated at 15 mmHg or greater.  Aortic Valve There is trace aortic  regurgitation. The mean AoV pressure gradient is 19mmHg. 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 The prosthetic aortic valve is not well visualized.  Pulmonic Valve The pulmonic valve is not well visualized.  Vessels The aortic root is normal size.  Pericardium There is no pericardial effusion.  Rhythm The rhythm was undetermined. ______________________________________________________________________________ MMode/2D Measurements & Calculations  IVSd: 0.89 cm LVIDd: 5.6 cm LVIDs: 3.9 cm LVPWd: 1.1 cm FS: 30.1 % LV mass(C)d: 217.9 grams LV mass(C)dI: 116.0 grams/m2 Ao root diam: 3.5 cm LA dimension: 3.7 cm LA/Ao: 1.1 LVOT diam: 2.4 cm LVOT area: 4.3 cm2 LA Volume (BP): 54.9 ml LA Volume Index (BP): 29.2 ml/m2 RWT: 0.38  Doppler Measurements & Calculations MV E max santi: 170.0 cm/sec MV A max santi: 85.2 cm/sec MV E/A: 2.0 MV dec slope: 1372 cm/sec2 Ao V2 max: 299.2 cm/sec Ao max P.0 mmHg Ao V2 mean: 201.6 cm/sec Ao mean P.1 mmHg Ao V2 VTI: 55.6 cm АННА(I,D): 1.4 cm2 АННА(V,D): 1.4 cm2 LV V1 max PG: 3.6 mmHg LV V1 max: 94.5 cm/sec LV V1 VTI: 17.8 cm SV(LVOT): 77.1 ml SI(LVOT): 41.1 ml/m2 TR max santi: 221.3 cm/sec TR max P.6 mmHg AV Santi Ratio (DI): 0.32 АННА Index (cm2/m2): 0.74 E/E' av.3 Lateral E/e': 6.6 Medial E/e': 22.1  ______________________________________________________________________________ Report approved by: Calli Hill 2022 10:55 AM       US Lower Extremity Venous Duplex Bilateral    Result Date: 2022  EXAM: US LOWER EXTREMITY VENOUS DUPLEX BILATERAL LOCATION: [facility] DATE/TIME: 2022 2:06 PM INDICATION: Hypoxia. Shortness of breath. COMPARISON: 2020. TECHNIQUE: Venous Duplex ultrasound of bilateral lower extremities with and without compression, augmentation and duplex. Color flow and spectral Doppler with waveform analysis performed. FINDINGS: Exam includes the common femoral, femoral, popliteal veins as well as segmentally visualized  deep calf veins and greater saphenous vein. RIGHT: No deep vein thrombosis. No superficial thrombophlebitis. No popliteal cyst. LEFT: No deep vein thrombosis. No superficial thrombophlebitis. No popliteal cyst.     IMPRESSION: 1.  No deep venous thrombosis in the bilateral lower extremities.    XR Chest Port 1 View    Result Date: 11/6/2022  EXAM: XR CHEST PORTABLE 1 VIEW LOCATION: [facility] DATE/TIME: 11/06/2022, 10:25 AM INDICATION: Shortness of breath. COMPARISON: 10/09/2022.     IMPRESSION: Previously seen infiltrates nearly resolved. No definite new infiltrates.     CT Chest w/o Contrast    Result Date: 11/7/2022  CT CHEST WITHOUT CONTRAST November 7, 2022 10:30 AM HISTORY: Hypoxia, sepsis unclear source, recent abnormal CT chest. COMPARISON: October 18, 2022. TECHNIQUE: Volumetric helical acquisition of CT images of the chest from the clavicles to the kidneys were acquired without IV contrast. Radiation dose for this scan was reduced using automated exposure control, adjustment of the mA and/or kV according to patient size, or iterative reconstruction technique. FINDINGS: LUNGS AND PLEURA: Minimal bilateral effusions right worse than left. Mild associated probable compressive atelectasis versus less likely infiltrate. Effusion is minimally improved on the right and new on the left compared to previous. Emphysema. Nodular and interstitial changes in the right lung are improved from previous compatible with an improving infiltrate. MEDIASTINUM/AXILLAE: No gross adenopathy in the absence of contrast. CORONARY ARTERY CALCIFICATIONS: Mild. UPPER ABDOMEN: No acute findings. Low-attenuation subcentimeter liver lesion(s) compatible with benign cysts or other benign lesions. No specific evaluation or follow-up is recommended in a low risk patient. MUSCULOSKELETAL: No frankly destructive bony lesions.     IMPRESSION: 1. Stable to minimally improved right pleural effusion and associated  probable compressive atelectasis versus less likely infiltrate. 2. New minimal left effusion with associated compressive atelectasis and/or infiltrate. FUENTES SIMON MD   SYSTEM ID:  [id number]

## 2022-11-10 NOTE — PROGRESS NOTES
[facility]    Medicine Progress Note - Hospitalist Service    Date of Admission:  11/6/2022    Assessment & Plan   Mr. Hermilo Velasquez is a 89-year-old male patient with history including COPD on chronic oxygen nocturnally; CHF (HFpEF); paroxysmal atrial fibrillation; aortic stenosis, status post TAVR; chronic kidney disease stage III; BPH; chronic dysphagia; history of PE and DVT; history of GI bleed felt to be related to colonic AVMs (08/2022); history of lung cancer status post right lower lobe resection (2019); and a recent hospitalization at [facility] (10/09/2022-10/17/2022) with issues including sepsis due to pneumonia with acute hypoxic respiratory failure.  He presented 11/6/2022 with the above issues presents after being found unresponsive with his oxygen off and noted to be hypotensive with acute hypoxic respiratory failure.    On initial evaluation, temperature 97.9, heart rate 120, blood pressure 66/40, respiratory rate 20, oxygen saturations were in the 80s on room air.  He was placed on oxygen up to 10 liters.  Laboratory evaluation showed white count 12.6, hemoglobin 10.4, platelets 172; BMP showed notable for a creatinine 1.77 (of note, creatinine was normal on 10/17/2022) NTP-BNP 5267; lactate 2.3, procalcitonin 1.23; TSH 4.36, but free T4 was normal at 0.97; ABG showed a pH of 7.23, pCO2 of 65, pO2 is 40; COVID, influenza  RSV testing were negative; UA negative.  Chest x-ray showed previously seen infiltrates nearly resolved with no definite new infiltrates.          Acute hypoxic respiratory failure with hypotension, suspect septic shock due to aspiration pneumonia and also related to being off home oxygen.  * Appears that home nocturnal O2 fell off during the night PTA. Initial presentation as above. Started on cefepime and doxycycline on admit. BC's 11/6 NGTD. Bilateral lower extremity US 11/6 negative for DVT.  * 11/7: BP's and O2 requirements improved. Echo 11/7  showed LVEF 55-60%, no regional wall motion abnormalities; RV moderate dilated, RV systolic function mildly reduced; prosthetic AV not well visualized, mean gradient 19 mmHg. CT chest 11/7 showed stable to minimally improved right pleural effusion and associated probable compressive atelectasis versus less likely infiltrate; new minimal left effusion with associated compressive atelectasis and/or infiltrate.   * 11/8: Down to 2L/RA.  Plan  - Continue O2, wean as able.  - Continue doxycycline (started 11/6) and cefepime (started 11/6). To complete a 7day course  - Continue to monitor cultures.  - Dysphagia management as below.     COPD.    - Continue O2 at night and PRN.  - Continue fluticasone-vilanterol, umeclidinium and PRN albuterol.     Chronic dysphagia.  * Concern for aspiration pneumonia as above. SLP consulted on admit.  * On 11/7, patient expressed strong dislike for thickened liquids. Palliative care consulted 11/7 to see him to help with goals of care discussions around swallowing/diet.    - Continue dysphagia level 6 diet with mildly thickened liquids, advance diet as able per SLP; may advance for pt comfort knowing aspiration risks.  - Continue aspiration precautions.  - my colleague d/w Palliative care 11/8; from speaking with pt who was more clear and less [id number]/8/22, he definitely wants to have thin liquids and does not like modified diet; as such, do not feel we need Palliative evaluation for now and will cancel consult; appreciate help.     Altered mental status, suspect due to metabolic and infectious/septic encephalopathy.  * Initial presentation as above.  * On 11/7, mental status improved.  * On 11/8, mental status much improved compared with admit.  Plan  - Continue to treat other issues as noted.  - Re-orient as needed.  - Maintain normal day/night, sleep/wake cycles.  - Minimize sedating medications as able.     Acute kidney injury on CKD3, suspect prerenal. - now resolved  * Cr 1.77 on  admit. Previously normal 10/2022.  * Cr normalized 11/8.  Plan  - restart PTA bumetanide today  - Monitor BMP.     Hypernatremia due to hypertonic fluids and dehydration.  * Sodium normal on admit.  * Sodium up to 146 on 11/7. IVF's changed to 1/2 NS: now discontinued  Plan  - Continue to encourage oral rehydration.     Thrombocytopenia, unclear etiology, possibly from infection/sepsis, medication effect or from other cause.  * Platelets normal on admit 11/6.  * Platelets decreased 11/7.        Recent Labs   Lab 11/08/22  0506 11/07/22  0519 11/06/22  1007   * 123* 172   - Monitor CBC.  - Consider platelet transfusion if needs a procedure and less than 50,000; or if less than 10,000.     Congestive heart failure (LV diastolic, RV systolic)  Aortic stenosis, status post TAVR.   [PTA BP meds/diuretics: bumetanide 0.5 mg daily; metoprolol 25 mg BID.]  * Echocardiogram from 08/2020 showed left ventricular EF of 50% to 55%; right ventricle appeared moderate to severely enlarged in certain views and function could not be assessed due to poor image quality; prosthetic aortic valve was not well visualized.  * Echo 11/7 showed LVEF 55-60%, no regional wall motion abnormalities; RV moderate dilated, RV systolic function mildly reduced; prosthetic AV not well visualized, mean gradient 19 mmHg.  * Metoprolol restarted at lower dose 11/7.  Plan  - Increase metoprolol 12.5 --> 25 mg BID.    - Continue to hold PTA bumetanide for now.    - Monitor I's and O's, daily weights.     Paroxysmal atrial fibrillation.    * The patient has been off apixaban since GI bleed in August 2022.  - Restart metoprolol 25 mg BID.     Weakness and physical deconditioning due to multiple acute and chronic medical issues.  - Continue PT and OT.     Dyslipidemia.   - Continue atorvastatin at discharge.     Chronic anemia.    * Hgb stable at 10.4 on admit.  * Hgb dropped to 8.8 11/8, suspect dilutional.        Recent Labs   Lab 11/08/22  0506  11/07/22  0519 11/06/22  1007   HGB 8.8* 9.3* 10.4*   - Continue iron supplement.  - Monitor CBC.  - Consider prbc transfusion if hgb </= 7.0 or if significant bleeding with hemodynamic instability or if symptomatic.     GERD.    - Continue famotidine.     BPH.    * Suazo placed in ED on admit.  * Suazo.discontinued  - PTA finasteride restarted  - Continue tamsulosin.     H/o GI bleed in 08/2022, possibly due to colonic AVM's.  * Seen by Chadd BLAKE at that time. Required blood transfusion. EGD during the hospitalization was negative; colonoscopy deferred due to agitated delirium and clinical stability.  - Monitor clinically.       Diet: Combination Diet Easy to Chew (level 7); Thin Liquids (level 0) (No straw, chin tuck, small sips); No Straws  Room Service    DVT Prophylaxis: Pneumatic Compression Devices  Suazo Catheter: Not present  Central Lines: None  Cardiac Monitoring: None  Code Status: Full Code      Disposition Plan     Expected Discharge Date: 11/11/2022        Discharge Comments: 11/7 2.5 L O2  11/8 converted to NSR, needs PT  11/9 refusing PT        The patient's care was discussed with the Patient.    Layla Faustin MD  Hospitalist Service  [facility]  Securely message with the Vocera Web Console (learn more here)  Text page via [employer] Paging/Directory         Clinically Significant Risk Factors              # Hypoalbuminemia: Lowest albumin = 2.5 g/dL (Ref range: 3.5-5.2) at 11/6/2022 10:07 AM, will monitor as appropriate   # Thrombocytopenia: Lowest platelets = 121 (Ref range: 150-450) in last 2 days, will monitor for bleeding                ______________________________________________________________________    Interval History   Patient appears to be more lethargic today and somewhat slow to respond.  Had initially discussed with patient yesterday about discharge today, will hold off due to new symptoms.    Data reviewed today: I reviewed all medications, new labs and imaging  results over the last 24 hours. I personally reviewed no images or EKG's today.    Physical Exam   Vital Signs: Temp: 98.1  F (36.7  C) Temp src: Oral BP: 113/53 Pulse: 57   Resp: 16 SpO2: 93 % O2 Device: Nasal cannula Oxygen Delivery: 1 LPM  Weight: 162 lbs 14.72 oz  General Appearance: Well appearing for stated age.  Respiratory: CTAB, no rales or ronchi  Cardiovascular: S1, S2 normal, no murmurs  GI: non-tender on palpation, BS present      Data   Recent Labs   Lab 11/10/22  0956 11/09/22  0535 11/08/22  0506 11/07/22  0519 11/06/22  1007   WBC  --  8.2 8.7 13.0* 12.6*   HGB  --  9.2* 8.8* 9.3* 10.4*   MCV  --  93 93 94 95   PLT  --  121* 116* 123* 172   NA  --  145* 147* 146* 142   POTASSIUM  --  3.8 3.8 3.8 4.8   CHLORIDE  --  116* 117* 117* 111*   CO2  --  24 24 26 26   BUN  --  25 24 30 29   CR  --  0.89 0.98 1.33* 1.77*  1.9*   ANIONGAP  --  5 6 3 5   AMRITA  --  9.2 8.7 8.0* 8.0*   * 100* 92 118* 185*   ALBUMIN  --   --   --   --  2.5*   PROTTOTAL  --   --   --   --  6.5*   BILITOTAL  --   --   --   --  0.4   ALKPHOS  --   --   --   --  87   ALT  --   --   --   --  10   AST  --   --   --   --  16     No results found for this or any previous visit (from the past 24 hour(s)).

## 2022-11-11 ENCOUNTER — APPOINTMENT (OUTPATIENT)
Dept: SPEECH THERAPY | Facility: CLINIC | Age: 87
DRG: 871 | End: 2022-11-11
Payer: COMMERCIAL

## 2022-11-11 ENCOUNTER — APPOINTMENT (OUTPATIENT)
Dept: PHYSICAL THERAPY | Facility: CLINIC | Age: 87
DRG: 871 | End: 2022-11-11
Attending: INTERNAL MEDICINE
Payer: COMMERCIAL

## 2022-11-11 ENCOUNTER — APPOINTMENT (OUTPATIENT)
Dept: OCCUPATIONAL THERAPY | Facility: CLINIC | Age: 87
DRG: 871 | End: 2022-11-11
Payer: COMMERCIAL

## 2022-11-11 LAB — BACTERIA BLD CULT: NO GROWTH

## 2022-11-11 PROCEDURE — 97161 PT EVAL LOW COMPLEX 20 MIN: CPT | Mod: GP

## 2022-11-11 PROCEDURE — 97530 THERAPEUTIC ACTIVITIES: CPT | Mod: GO | Performed by: OCCUPATIONAL THERAPIST

## 2022-11-11 PROCEDURE — 250N000013 HC RX MED GY IP 250 OP 250 PS 637: Performed by: INTERNAL MEDICINE

## 2022-11-11 PROCEDURE — 250N000011 HC RX IP 250 OP 636: Performed by: INTERNAL MEDICINE

## 2022-11-11 PROCEDURE — 999N000040 HC STATISTIC CONSULT NO CHARGE VASC ACCESS

## 2022-11-11 PROCEDURE — 250N000013 HC RX MED GY IP 250 OP 250 PS 637: Performed by: HOSPITALIST

## 2022-11-11 PROCEDURE — 99232 SBSQ HOSP IP/OBS MODERATE 35: CPT | Performed by: HOSPITALIST

## 2022-11-11 PROCEDURE — 97116 GAIT TRAINING THERAPY: CPT | Mod: GP

## 2022-11-11 PROCEDURE — 999N000128 HC STATISTIC PERIPHERAL IV START W/O US GUIDANCE

## 2022-11-11 PROCEDURE — 120N000001 HC R&B MED SURG/OB

## 2022-11-11 PROCEDURE — 92526 ORAL FUNCTION THERAPY: CPT | Mod: GN | Performed by: REHABILITATION PRACTITIONER

## 2022-11-11 RX ADMIN — FERROUS SULFATE TAB 325 MG (65 MG ELEMENTAL FE) 325 MG: 325 (65 FE) TAB at 08:14

## 2022-11-11 RX ADMIN — DOXYCYCLINE 100 MG: 100 INJECTION, POWDER, LYOPHILIZED, FOR SOLUTION INTRAVENOUS at 00:04

## 2022-11-11 RX ADMIN — CEFEPIME HYDROCHLORIDE 2 G: 2 INJECTION, POWDER, FOR SOLUTION INTRAVENOUS at 11:07

## 2022-11-11 RX ADMIN — FAMOTIDINE 20 MG: 20 TABLET ORAL at 20:22

## 2022-11-11 RX ADMIN — FINASTERIDE 5 MG: 5 TABLET, FILM COATED ORAL at 08:14

## 2022-11-11 RX ADMIN — DOXYCYCLINE 100 MG: 100 INJECTION, POWDER, LYOPHILIZED, FOR SOLUTION INTRAVENOUS at 12:57

## 2022-11-11 RX ADMIN — METOPROLOL TARTRATE 25 MG: 25 TABLET, FILM COATED ORAL at 08:41

## 2022-11-11 RX ADMIN — FAMOTIDINE 20 MG: 20 TABLET ORAL at 08:14

## 2022-11-11 RX ADMIN — UMECLIDINIUM 1 PUFF: 62.5 AEROSOL, POWDER ORAL at 08:27

## 2022-11-11 RX ADMIN — FLUTICASONE FUROATE AND VILANTEROL TRIFENATATE 1 PUFF: 200; 25 POWDER RESPIRATORY (INHALATION) at 08:28

## 2022-11-11 RX ADMIN — BUMETANIDE 0.5 MG: 0.5 TABLET ORAL at 08:14

## 2022-11-11 RX ADMIN — TAMSULOSIN HYDROCHLORIDE 0.4 MG: 0.4 CAPSULE ORAL at 20:22

## 2022-11-11 ASSESSMENT — ACTIVITIES OF DAILY LIVING (ADL)
ADLS_ACUITY_SCORE: 48

## 2022-11-11 NOTE — PLAN OF CARE
Goal Outcome Evaluation:       A&O x's 3, a little confused and slow to respond, but oriented to place and self. VSS - O2 1 LPM  Lungs sounds - Diminished all fields. Bowel Sounds - normoactive, no BM during shift. Urine Output - incontinent with external cath, good output. Ambulation - Assist of one with gait belt and walker. Diet - Regular with thin liquids. Pain controlled by - pain denied during shift. Social work following and will discharge 11/11 if bed open at TCU. Pt pulled left PIV out from hand.  Paged IV team to insert a new one before next IV med at 1100. Plan is to discharge to TCU.

## 2022-11-11 NOTE — PLAN OF CARE
Orientations: 2-3  Vitals/Pain: VSS on 2 L NC - baseline  Lines/Drains: PIV in R forearm saline locked  Skin/Wounds: redness on scrotum and sacrum - mepi in place  GI/: incontinent of bowel and bladder  Diet: easy to chew (level 7) with thin liquids - no straws  Labs: Abnormal/Trends, Electrolyte Replacement- no electrolyte replacement  Ambulation/Assist: x1 w/ walker and gait belt  Plan: plan for possible discharge tomorrow morning. PT cleared patient to go home which patient and wife want.

## 2022-11-11 NOTE — PROGRESS NOTES
11/11/22 6085   Appointment Info   Signing Clinician's Name / Credentials (PT) Ashley Soto, PT, DPT   Living Environment   People in Home spouse   Current Living Arrangements house   Home Accessibility stairs to enter home   Number of Stairs, Main Entrance 1   Stair Railings, Main Entrance railings safe and in good condition   Number of Stairs, Within Home, Primary greater than 10 stairs   Stair Railings, Within Home, Primary railings safe and in good condition   Transportation Anticipated family or friend will provide   Living Environment Comments Pt reports he can live on main level, otherwise has > 15 stairs in house. Pt has FWW at home and raised toilet seats   Self-Care   Usual Activity Tolerance good   Current Activity Tolerance fair   Regular Exercise No   Equipment Currently Used at Home walker, standard   Fall history within last six months yes   General Information   Onset of Illness/Injury or Date of Surgery 11/06/22   Referring Physician Armaan Cota DO   Patient/Family Therapy Goals Statement (PT) return leyda   Pertinent History of Current Problem (include personal factors and/or comorbidities that impact the POC) 89-year-old male patient with history including COPD on chronic oxygen nocturnally; CHF (HFpEF); paroxysmal atrial fibrillation; aortic stenosis, status post TAVR; chronic kidney disease stage III; BPH; chronic dysphagia; history of PE and DVT; history of GI bleed felt to be related to colonic AVMs (08/2022); history of lung cancer status post right lower lobe resection (2019); and a recent hospitalization at [facility] (10/09/2022-10/17/2022) with issues including sepsis due to pneumonia with acute hypoxic respiratory failure.  He presented 11/6/2022 with the above issues presents after being found unresponsive with his oxygen off and noted to be hypotensive with acute hypoxic respiratory failure. see chart for details   Existing Precautions/Restrictions oxygen therapy device and  L/min   General Observations pt on 2.5 L NC, SPO2 at rest: 96%, resting in bed, NAD   Cognition   Affect/Mental Status (Cognition) WFL   Cognitive Status Comments [place], appropriate with conversation   Posture    Posture Forward head position;Kyphosis   Range of Motion (ROM)   Range of Motion ROM is WFL   Strength (Manual Muscle Testing)   Strength Comments generalized weakness, able to lift ext x 4 against gravity   Bed Mobility   Comment, (Bed Mobility) SBA with increased time supine to sit with elevated HOB and railing   Transfers   Comment, (Transfers) min A with sit to stand with FWW   Gait/Stairs (Locomotion)   [place]ardo Level (Gait) contact guard   Assistive Device (Gait) walker, front-wheeled   Distance in Feet (Required for LE Total Joints) 15   Distance in Feet (Gait) 400ft   Pattern (Gait) step-through   Comment, (Gait/Stairs) flexed forward trunk, heavy UE support on the FWW, step through pattern   Balance   Balance Comments CGA with FWW support   Sensory Examination   Sensory Perception Comments pt reports chronic neuropathy B feet, intact grossly to light touch B feet   Clinical Impression   Criteria for Skilled Therapeutic Intervention Yes, treatment indicated   PT Diagnosis (PT) impaired gait   Influenced by the following impairments impaired balance, impaired activity tolerance, impaired strength.   Functional limitations due to impairments impaired IND with mobility   Clinical Presentation (PT Evaluation Complexity) Stable/Uncomplicated   Clinical Presentation Rationale clinical judgement   Clinical Decision Making (Complexity) low complexity   Planned Therapy Interventions (PT) balance training;bed mobility training;gait training;home exercise program;home program guidelines;risk factor education;progressive activity/exercise;transfer training;stair training;patient/family education   Risk & Benefits of therapy have been explained evaluation/treatment results reviewed;care plan/treatment goals  reviewed;risks/benefits reviewed;current/potential barriers reviewed;participants voiced agreement with care plan;participants included;patient   PT Total Evaluation Time   PT Eval, Low Complexity Minutes (41289) 10   Physical Therapy Goals   PT Frequency Daily   PT Predicted Duration/Target Date for Goal Attainment 11/18/22   PT Goals Bed Mobility;Transfers;Gait;Stairs   PT: Bed Mobility Independent;Supine to/from sit   PT: Transfers Modified independent;Bed to/from chair;Sit to/from stand;Assistive device   PT: Gait Modified independent;Greater than 200 feet;Rolling walker   PT: Stairs Supervision/stand-by assist;3 stairs;Rail on both sides   Gait Training   Gait Training Minutes (53973) 23   Symptoms Noted During/After Treatment (Gait Training) fatigue   Treatment Detail/Skilled Intervention amb with FWW with CGA to close SBA, no gross LOB with head turning or turns. monitored SPO2 on 2 L NC, maintained >90% throughout. Pt reports only walked in halls 1x since admission, encouraged pt to amb in halls 4x/day with staff assist to improved endurance and strength.   PT Discharge Planning   PT Plan progress independence with OOB mobility and FWW, monitor O2 sats with activity, trial steps to enter home safety   PT Discharge Recommendation (DC Rec) home with assist;home with home care physical therapy;Transitional Care Facility   PT Rationale for DC Rec This pt is known to this writer from most recent hospital admission, at that time pt refused TCU and d/c home with spouse with home PT services. Pt again refusing TCU and wants to return home. Pt appears stronger than his last hospitalization, CGA with FWW, maintaining SPO2 >90% on 2 L NC. Recommend home PT at d/c with continued spouse assist as pt refusing TCU. Will continue to work with pt to maximize his strenght while in hospital   PT Brief overview of current status Recommend pt amb in hallways 4x/day with staff assist.   Total Session Time   Timed Code Treatment  Minutes 23   Total Session Time (sum of timed and untimed services) 33

## 2022-11-12 VITALS
WEIGHT: 180 LBS | BODY MASS INDEX: 27.37 KG/M2 | RESPIRATION RATE: 16 BRPM | HEART RATE: 55 BPM | DIASTOLIC BLOOD PRESSURE: 68 MMHG | OXYGEN SATURATION: 90 % | TEMPERATURE: 98 F | SYSTOLIC BLOOD PRESSURE: 112 MMHG

## 2022-11-12 LAB — BACTERIA BLD CULT: NO GROWTH

## 2022-11-12 PROCEDURE — 99239 HOSP IP/OBS DSCHRG MGMT >30: CPT | Performed by: HOSPITALIST

## 2022-11-12 PROCEDURE — 250N000013 HC RX MED GY IP 250 OP 250 PS 637: Performed by: INTERNAL MEDICINE

## 2022-11-12 PROCEDURE — 250N000011 HC RX IP 250 OP 636: Performed by: INTERNAL MEDICINE

## 2022-11-12 PROCEDURE — 250N000013 HC RX MED GY IP 250 OP 250 PS 637: Performed by: HOSPITALIST

## 2022-11-12 RX ORDER — DOXYCYCLINE 100 MG/1
100 CAPSULE ORAL EVERY 12 HOURS SCHEDULED
Status: DISCONTINUED | OUTPATIENT
Start: 2022-11-12 | End: 2022-11-12 | Stop reason: HOSPADM

## 2022-11-12 RX ADMIN — METOPROLOL TARTRATE 25 MG: 25 TABLET, FILM COATED ORAL at 09:10

## 2022-11-12 RX ADMIN — UMECLIDINIUM 1 PUFF: 62.5 AEROSOL, POWDER ORAL at 09:27

## 2022-11-12 RX ADMIN — FERROUS SULFATE TAB 325 MG (65 MG ELEMENTAL FE) 325 MG: 325 (65 FE) TAB at 09:09

## 2022-11-12 RX ADMIN — CEFEPIME HYDROCHLORIDE 2 G: 2 INJECTION, POWDER, FOR SOLUTION INTRAVENOUS at 00:01

## 2022-11-12 RX ADMIN — FAMOTIDINE 20 MG: 20 TABLET ORAL at 09:10

## 2022-11-12 RX ADMIN — BUMETANIDE 0.5 MG: 0.5 TABLET ORAL at 09:10

## 2022-11-12 RX ADMIN — FINASTERIDE 5 MG: 5 TABLET, FILM COATED ORAL at 09:10

## 2022-11-12 RX ADMIN — FLUTICASONE FUROATE AND VILANTEROL TRIFENATATE 1 PUFF: 200; 25 POWDER RESPIRATORY (INHALATION) at 09:27

## 2022-11-12 RX ADMIN — DOXYCYCLINE HYCLATE 100 MG: 100 CAPSULE ORAL at 09:10

## 2022-11-12 ASSESSMENT — ACTIVITIES OF DAILY LIVING (ADL)
ADLS_ACUITY_SCORE: 48

## 2022-11-12 NOTE — PROGRESS NOTES
Occupational Therapy Discharge Summary    Reason for therapy discharge:    Discharged to home with home therapy.    Progress towards therapy goal(s). See goals on Care Plan in [facility] electronic health record for goal details.  Goals not met.  Barriers to achieving goals:   discharge from facility.    Therapy recommendation(s):    Continued therapy is recommended.  Rationale/Recommendations:  Pt functioning below baseline and will benefit from continued skilled OT to maximize safety and independence. Primarily limited by impairments in strength, cognition, activity tolerance. Recommend TCU. If pt declines TCU would recommend hands on assist of 1 with self-cares and mobility, total assist with IADLs, home OT/PT/RN, use of raised toilet seat, shower chair, transport chair.

## 2022-11-12 NOTE — CARE PLAN
"Physical Therapy Discharge Summary    Reason for therapy discharge:    Discharged to home with home therapy.    Progress towards therapy goal(s). See goals on Care Plan in [facility] electronic health record for goal details.  Goals partially met.  Barriers to achieving goals:   discharge from facility.    Therapy recommendation(s):    Continued therapy is recommended.  Rationale/Recommendations:  HH PT to address continued deficits.     \"Pt again refusing TCU and wants to return home. Pt appears stronger than his last hospitalization, CGA with FWW, maintaining SPO2 >90% on 2 L NC. Recommend home PT at d/c with continued spouse assist as pt refusing TCU. Will continue to work with pt to maximize his strenght while in hospital\"     **Pt not seen by discharging therapist this date, summary written according to documentation from prior treating therapist.    "

## 2022-11-12 NOTE — DISCHARGE SUMMARY
[facility]  Hospitalist Discharge Summary      Date of Admission:  11/6/2022  Date of Discharge:  11/12/2022  3:06 PM  Discharging Provider: Layla Faustin MD  Discharge Service: Hospitalist Service    Discharge Diagnoses    Acute hypoxic respiratory failure with hypotension, suspect septic shock due to aspiration pneumonia and also related to being off home oxygen.  Chronic dysphagia.  metabolic encephalopathy due to sepsis  Acute kidney injury on CKD3  Hypernatremia due to hypertonic fluids and dehydration    Follow-ups Needed After Discharge   Follow-up Appointments     Follow-up and recommended labs and tests       Follow up with primary care provider, Karson Bishop, within 7 days for   hospital follow- up.  No follow up labs or test are needed.              Unresulted Labs Ordered in the Past 30 Days of this Admission     Date and Time Order Name Status Description    11/7/2022 11:54 AM Blood Culture Wrist, Left Preliminary       These results will be followed up by     Discharge Disposition   Discharged to home  Condition at discharge: Stable    Hospital Course   Mr. Hermilo Velasquez is a 89-year-old male patient with history including COPD on chronic oxygen nocturnally; CHF (HFpEF); paroxysmal atrial fibrillation; aortic stenosis, status post TAVR; chronic kidney disease stage III; BPH; chronic dysphagia; history of PE and DVT; history of GI bleed felt to be related to colonic AVMs (08/2022); history of lung cancer status post right lower lobe resection (2019); and a recent hospitalization at [facility] (10/09/2022-10/17/2022) with issues including sepsis due to pneumonia with acute hypoxic respiratory failure.  He presented 11/6/2022 with the above issues after being found unresponsive with his oxygen off and noted to be hypotensive with acute hypoxic respiratory failure.    On initial evaluation, temperature 97.9, heart rate 120, blood pressure 66/40, respiratory rate 20, oxygen  saturations were in the 80s on room air.  He was placed on oxygen up to 10 liters.  Laboratory evaluation showed white count 12.6, hemoglobin 10.4, platelets 172; BMP showed notable for a creatinine 1.77 (of note, creatinine was normal on 10/17/2022) NTP-BNP 5267; lactate 2.3, procalcitonin 1.23; TSH 4.36, but free T4 was normal at 0.97; ABG showed a pH of 7.23, pCO2 of 65, pO2 is 40; COVID, influenza  RSV testing were negative; UA negative.  Chest x-ray showed previously seen infiltrates nearly resolved with no definite new infiltrates.      Patient was admitted and treated for septic shock secondary to aspiration pneumonia. Hypoxia was thought to be due in part to not utilizing his home O2 and partially due to pneumonia. He was treated with IV abx with improved symptoms. Patient was weaned down to 2L NC prior to discharge and was discharged home on this. He is to wean this down as tolerated when he follows up with his PCP.     Of note, due to patients chronic dysphagia, he was supposed to be on a recommended dysphagia diet given by the nutritionist, patient however declined this diet and opted for a regular diet accepting the risks that come with this includIng and not limited to recurrent aspiration PNA and death.        Acute hypoxic respiratory failure with hypotension, suspect septic shock due to aspiration pneumonia and also related to being off home oxygen.  * Appears that home nocturnal O2 fell off during the night PTA. Initial presentation as above. Started on cefepime and doxycycline on admit. BC's 11/6 NGTD. Bilateral lower extremity US 11/6 negative for DVT.  * 11/7: BP's and O2 requirements improved. Echo 11/7 showed LVEF 55-60%, no regional wall motion abnormalities; RV moderate dilated, RV systolic function mildly reduced; prosthetic AV not well visualized, mean gradient 19 mmHg. CT chest 11/7 showed stable to minimally improved right pleural effusion and associated probable compressive atelectasis  versus less likely infiltrate; new minimal left effusion with associated compressive atelectasis and/or infiltrate.   * 11/8: Down to 2L/RA.  Plan  -  O2 weaned down to 2L   - completed a 7 day course of doxycycline and cefepime   - Dysphagia management as below.     COPD.    - Continue O2 at night and PRN.  - Continue fluticasone-vilanterol, umeclidinium and PRN albuterol.     Chronic dysphagia.  * Concern for aspiration pneumonia as above. SLP consulted on admit.  * On 11/7, patient expressed strong dislike for thickened liquids. Palliative care consulted 11/7 to see him to help with goals of care discussions around swallowing/diet.    - Continue dysphagia level 6 diet with mildly thickened liquids, advance diet as able per SLP; may advance for pt comfort knowing aspiration risks.  - Continue aspiration precautions.  - my colleague d/w Palliative care 11/8; from speaking with pt who was more clear and less [id number]/8/22, he definitely wants to have thin liquids and does not like modified diet; Palliative evaluation cancelled      Altered mental status, suspect due to metabolic and infectious/septic encephalopathy.  * Initial presentation as above.  * On 11/7, mental status improved.  * On 11/8, mental status much improved compared with admit.   - now resolved. Patient is back to his baseline.        Acute kidney injury on CKD3, suspect prerenal. - now resolved  * Cr 1.77 on admit. Previously normal 10/2022.  * Cr normalized 11/8.  Plan  - restart PTA bumetanide today  - Monitor BMP.     Hypernatremia due to hypertonic fluids and dehydration.  * Sodium normal on admit.  * Sodium up to 146 on 11/7. IVF's changed to 1/2 NS: now discontinued  Plan  - Continue to encourage oral rehydration.     Thrombocytopenia, unclear etiology, possibly from infection/sepsis, medication effect or from other cause.  * Platelets normal on admit 11/6.  * Platelets decreased 11/7.  -- monitor      Congestive heart failure (LV  diastolic, RV systolic)  Aortic stenosis, status post TAVR.   [PTA BP meds/diuretics: bumetanide 0.5 mg daily; metoprolol 25 mg BID.]  * Echocardiogram from 08/2020 showed left ventricular EF of 50% to 55%; right ventricle appeared moderate to severely enlarged in certain views and function could not be assessed due to poor image quality; prosthetic aortic valve was not well visualized.  * Echo 11/7 showed LVEF 55-60%, no regional wall motion abnormalities; RV moderate dilated, RV systolic function mildly reduced; prosthetic AV not well visualized, mean gradient 19 mmHg.  * Metoprolol restarted at lower dose 11/7.  Plan  - Increase metoprolol 12.5 --> 25 mg BID.    - Continue to hold PTA bumetanide for now.    - Monitor I's and O's, daily weights.     Paroxysmal atrial fibrillation.    * The patient has been off apixaban since GI bleed in August 2022.  - Restart metoprolol 25 mg BID.    Oxygen Documentation:   I certify that this patient, Hermilo Velasquez has been under my care (or a nurse practitioner or physician's assistant working with me). This is the face-to-face encounter for oxygen medical necessity.      Hermilo Velasquez is now in a chronic stable state and continues to require supplemental oxygen. 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    Alternative treatment(s) tried or considered and deemed clinically infective for treatment of Pneumonia include antibiotics.  If portability is ordered, is the patient mobile within the home? yes    **Patients who qualify for home O2 coverage under the CMS guidelines require ABG tests or O2 sat readings obtained closest to, but no earlier than 2 days prior to the discharge, as evidence of the need for home oxygen therapy. Testing must be performed while patient is in the chronic stable state. See notes for O2 sats.**                Consultations This Hospital Stay   PHARMACY TO DOSE VANCO  SPEECH LANGUAGE PATH ADULT IP  CONSULT  PHYSICAL THERAPY ADULT IP CONSULT  PALLIATIVE CARE ADULT IP CONSULT  VASCULAR ACCESS ADULT IP CONSULT  VASCULAR ACCESS ADULT IP CONSULT  OCCUPATIONAL THERAPY ADULT IP CONSULT  CARE MANAGEMENT / SOCIAL WORK IP CONSULT  VASCULAR ACCESS ADULT IP CONSULT  VASCULAR ACCESS ADULT IP CONSULT  VASCULAR ACCESS ADULT IP CONSULT  VASCULAR ACCESS ADULT IP CONSULT  PHARMACY TO DOSE VANCO    Code Status   Full Code    Time Spent on this Encounter   I, Layla Faustin MD, personally saw the patient today and spent greater than 30 minutes discharging this patient.       Layla Faustin MD  [place] [address] [place] [address]  Phone: [phone]  ______________________________________________________________________    Physical Exam   Vital Signs: Temp: 98  F (36.7  C) Temp src: Oral BP: 112/68 Pulse: 73   Resp: 16 SpO2: 98 % O2 Device: Nasal cannula Oxygen Delivery: 2 LPM  Weight: 180 lbs 0 oz  General Appearance: Well appearing for stated age.  Respiratory: CTAB, no rales or ronchi  Cardiovascular: S1, S2 normal, no murmurs  GI: non-tender on palpation, BS present         Primary Care Physician   Karson Bishop    Discharge Orders      Medication Therapy Management Referral      Reason for your hospital stay    You were treated for aspiration pneumonia     Follow-up and recommended labs and tests     Follow up with primary care provider, Karson Bishop, within 7 days for hospital follow- up.  No follow up labs or test are needed.     Activity    Your activity upon discharge: activity as tolerated     Diet    Follow this diet upon discharge: Orders Placed This Encounter      Room Service      Combination Diet Easy to Chew (level 7); Thin Liquids (level 0) (No straw, chin tuck, small sips); No Straws       Significant Results and Procedures   Most Recent 3 CBC's:Recent Labs   Lab Test 11/09/22  0535 11/08/22  0506 11/07/22  0519   WBC 8.2 8.7 13.0*   HGB 9.2* 8.8* 9.3*   MCV 93 93  94   * 116* 123*     Most Recent 3 BMP's:Recent Labs   Lab Test 11/10/22  2037 11/10/22  0956 11/09/22  0535 11/08/22  0506     --  145* 147*   POTASSIUM 3.7  --  3.8 3.8   CHLORIDE 109  --  116* 117*   CO2 27  --  24 24   BUN 20  --  25 24   CR 0.75  --  0.89 0.98   ANIONGAP 7  --  5 6   AMRITA 9.2  --  9.2 8.7   * 101* 100* 92   ,   Results for orders placed or performed during the hospital encounter of 11/06/22   XR Chest Port 1 View    Narrative    EXAM: XR CHEST PORTABLE 1 VIEW  LOCATION: [facility]  DATE/TIME: 11/06/2022, 10:25 AM    INDICATION: Shortness of breath.  COMPARISON: 10/09/2022.      Impression    IMPRESSION: Previously seen infiltrates nearly resolved. No definite new infiltrates.     US Lower Extremity Venous Duplex Bilateral    Narrative    EXAM: US LOWER EXTREMITY VENOUS DUPLEX BILATERAL  LOCATION: [facility]  DATE/TIME: 11/6/2022 2:06 PM    INDICATION: Hypoxia. Shortness of breath.  COMPARISON: 8/17/2020.  TECHNIQUE: Venous Duplex ultrasound of bilateral lower extremities with and without compression, augmentation and duplex. Color flow and spectral Doppler with waveform analysis performed.    FINDINGS: Exam includes the common femoral, femoral, popliteal veins as well as segmentally visualized deep calf veins and greater saphenous vein.     RIGHT: No deep vein thrombosis. No superficial thrombophlebitis. No popliteal cyst.    LEFT: No deep vein thrombosis. No superficial thrombophlebitis. No popliteal cyst.      Impression    IMPRESSION:  1.  No deep venous thrombosis in the bilateral lower extremities.   CT Chest w/o Contrast    Narrative    CT CHEST WITHOUT CONTRAST November 7, 2022 10:30 AM     HISTORY: Hypoxia, sepsis unclear source, recent abnormal CT chest.    COMPARISON: October 18, 2022.    TECHNIQUE: Volumetric helical acquisition of CT images of the chest  from the clavicles to the kidneys were acquired without IV  contrast.  Radiation dose for this scan was reduced using automated exposure  control, adjustment of the mA and/or kV according to patient size, or  iterative reconstruction technique.    FINDINGS:     LUNGS AND PLEURA: Minimal bilateral effusions right worse than left.  Mild associated probable compressive atelectasis versus less likely  infiltrate. Effusion is minimally improved on the right and new on the  left compared to previous. Emphysema. Nodular and interstitial changes  in the right lung are improved from previous compatible with an  improving infiltrate.    MEDIASTINUM/AXILLAE: No gross adenopathy in the absence of contrast.    CORONARY ARTERY CALCIFICATIONS: Mild.    UPPER ABDOMEN: No acute findings. Low-attenuation subcentimeter liver  lesion(s) compatible with benign cysts or other benign lesions. No  specific evaluation or follow-up is recommended in a low risk patient.    MUSCULOSKELETAL: No frankly destructive bony lesions.      Impression    IMPRESSION:   1. Stable to minimally improved right pleural effusion and associated  probable compressive atelectasis versus less likely infiltrate.  2. New minimal left effusion with associated compressive atelectasis  and/or infiltrate.    FUENTES SIMON MD         SYSTEM ID:  [id number]   Echocardiogram Complete     Value    LVEF  55-60%    Narrative    [id number]  [facility]  [id number]  [id number]^MAJO^ANDREY^NIXON     [facility]  Echocardiography Laboratory  [address] [place] [address]     Name: CLYDE RODRIGUEZ  MRN: [id number]  : 1932  Study Date: 2022 07:59 AM  Age: 89 yrs  Gender: Male  Patient Location: [facility]  Reason For Study: Dyspnea  Ordering Physician: ANDREY KASPER  Performed By: Davida Martinez RDCS     BSA: 1.9 m2  Height: 68 in  Weight: 164 lb  HR: 102  BP: 99/68 mmHg  ______________________________________________________________________________  Procedure  Complete Portable Echo Adult. Optison (NDC  #[id number]) given intravenously.  ______________________________________________________________________________  Interpretation Summary     Left ventricular systolic function is normal.  The visual ejection fraction is 55-60%.  No regional wall motion abnormalities noted.  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  The prosthetic aortic valve is not well visualized.  The mean AoV pressure gradient is 19mmHg.  The right ventricle is moderately dilated.  The right ventricular systolic function is mildly reduced.  Compared to 8/22, mean gradient across AV prosthesis has increased from 14 to  19mm.     The study was technically difficult.  ______________________________________________________________________________  Left Ventricle  The left ventricle is normal in size. There is normal left ventricular wall  thickness. Left ventricular systolic function is normal. The visual ejection  fraction is 55-60%. Left ventricular diastolic function is indeterminate. No  regional wall motion abnormalities noted. Septal motion is consistent with  conduction abnormality.     Right Ventricle  The right ventricle is moderately dilated. The right ventricular systolic  function is mildly reduced.     Atria  Normal left atrial size. The right atrium is severely dilated. 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     Mitral Valve  There is mild to moderate mitral annular calcification. There is trace to mild  mitral regurgitation.     Tricuspid Valve  There is trace to mild tricuspid regurgitation. The right ventricular systolic  pressure is approximated at 19.6 mmHg plus the right atrial pressure. IVC  diameter >2.1 cm collapsing <50% with sniff suggests a high RA pressure  estimated at 15 mmHg or greater.     Aortic Valve  There is trace aortic regurgitation. The mean AoV pressure gradient is 19mmHg.  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 The prosthetic aortic valve is not well  visualized.     Pulmonic Valve  The pulmonic valve is not well  visualized.     Vessels  The aortic root is normal size.     Pericardium  There is no pericardial effusion.     Rhythm  The rhythm was undetermined.  ______________________________________________________________________________  MMode/2D Measurements & Calculations     IVSd: 0.89 cm  LVIDd: 5.6 cm  LVIDs: 3.9 cm  LVPWd: 1.1 cm  FS: 30.1 %  LV mass(C)d: 217.9 grams  LV mass(C)dI: 116.0 grams/m2  Ao root diam: 3.5 cm  LA dimension: 3.7 cm  LA/Ao: 1.1  LVOT diam: 2.4 cm  LVOT area: 4.3 cm2  LA Volume (BP): 54.9 ml  LA Volume Index (BP): 29.2 ml/m2  RWT: 0.38     Doppler Measurements & Calculations  MV E max santi: 170.0 cm/sec  MV A max santi: 85.2 cm/sec  MV E/A: 2.0  MV dec slope: 1372 cm/sec2  Ao V2 max: 299.2 cm/sec  Ao max P.0 mmHg  Ao V2 mean: 201.6 cm/sec  Ao mean P.1 mmHg  Ao V2 VTI: 55.6 cm  АННА(I,D): 1.4 cm2  АННА(V,D): 1.4 cm2  LV V1 max PG: 3.6 mmHg  LV V1 max: 94.5 cm/sec  LV V1 VTI: 17.8 cm  SV(LVOT): 77.1 ml  SI(LVOT): 41.1 ml/m2  TR max santi: 221.3 cm/sec  TR max P.6 mmHg  AV Santi Ratio (DI): 0.32  АННА Index (cm2/m2): 0.74  E/E' av.3  Lateral E/e': 6.6  Medial E/e': 22.1     ______________________________________________________________________________  Report approved by: Calli Hill 2022 10:55 AM           *Note: Due to a large number of results and/or encounters for the requested time period, some results have not been displayed. A complete set of results can be found in Results Review.       Discharge Medications   Current Discharge Medication List      CONTINUE these medications which have NOT CHANGED    Details   acetaminophen (TYLENOL) 500 MG tablet Take 1,000 mg by mouth 3 times daily as needed      albuterol (PROAIR HFA/PROVENTIL HFA/VENTOLIN HFA) 108 (90 Base) MCG/ACT inhaler Inhale 2 puffs into the lungs every 6 hours as needed      albuterol (PROVENTIL) (2.5 MG/3ML) 0.083% neb solution Take 1 vial (2.5 mg) by nebulization every 6 hours as needed for shortness of  "breath / dyspnea or wheezing  Qty: 180 mL, Refills: 11      atorvastatin (LIPITOR) 10 MG tablet Take 1 tablet (10 mg) by mouth daily  Qty: 90 tablet, Refills: 2      bumetanide (BUMEX) 0.5 MG tablet Take 1 tablet (0.5 mg) by mouth daily  Qty: 30 tablet, Refills: 0    Associated Diagnoses: Chronic diastolic congestive heart failure (H)      famotidine (PEPCID) 40 MG tablet Take 40 mg by mouth 2 times daily      ferrous sulfate (FE TABS) 325 (65 Fe) MG EC tablet Take 1 tablet (325 mg) by mouth 2 times daily      finasteride (PROSCAR) 5 MG tablet Take 1 tablet (5 mg) by mouth daily  Qty: 30 tablet, Refills: 0    Associated Diagnoses: Urinary retention      fluticasone-vilanterol (BREO ELLIPTA) 200-25 MCG/INH inhaler Inhale 1 puff into the lungs daily      gabapentin (NEURONTIN) 100 MG capsule Take 2 capsules (200 mg) by mouth At Bedtime    Associated Diagnoses: Gastrointestinal hemorrhage, unspecified gastrointestinal hemorrhage type      metoprolol tartrate (LOPRESSOR) 25 MG tablet Take 1 tablet (25 mg) by mouth 2 times daily    Associated Diagnoses: Gastrointestinal hemorrhage, unspecified gastrointestinal hemorrhage type      tamsulosin (FLOMAX) 0.4 MG capsule Take 1 capsule (0.4 mg) by mouth every evening  Qty: 30 capsule, Refills: 0    Associated Diagnoses: Urinary retention      tiotropium (SPIRIVA) 18 MCG inhaled capsule Inhale 18 mcg into the lungs daily           Allergies   Allergies   Allergen Reactions     Omeprazole Itching     Pantoprazole Itching     Prevacid [Lansoprazole] Itching     Lasix [Furosemide] Rash     Lidocaine Blisters and Rash     Allergy to lidocaine ointment  Allergy to lidocaine ointment       Penicillin G Rash     Penicillins Rash     \"broke out from injection\" 60 yrs ago  Tolerates cephalosporins     "

## 2022-11-12 NOTE — DISCHARGE INSTRUCTIONS
[facility] has accepted you for home care services of RN and  Physical Therapy. Their phone number is ([phone])

## 2022-11-12 NOTE — PLAN OF CARE
"Speech Language Therapy Discharge Summary    Reason for therapy discharge:    Discharged to home with home therapy.    Progress towards therapy goal(s). See goals on Care Plan in [facility] electronic health record for goal details.  Goals not met.  Barriers to achieving goals:   discharge from facility.    Therapy recommendation(s):    Continued therapy is recommended.  Rationale/Recommendations:  Swallow function is close to most recent baseline and pt accepts risk for potential aspiration with thin liquids; Short term SLP Tx after d/c to train pt/caregiver strategies.    At time of discharge SLP recs: \"Continue IDDSI Diet Level 7 and thin liquids with reminders to use safe swallow strategies including a chin tuck with thin liquids.\"    *Pt not seen by discharging therapist on this date, note written based on previous treating therapist's notes and recommendations         "

## 2022-11-12 NOTE — PLAN OF CARE
Neuro:forgetful, intermittently disoriented to time and situation  Tele/cardiac: NA  Respiration: On 2-3L nasal canula  Activity :A1-2 GBW  Pain: denies  Drips: none, PIV infiltrated, pt refused another, MD aware, doxycycline changed to PO  Drains/tubes: none  Skin: blanchable redness to coccyx, mepilex in place  GI/: external cath in place, easy to chew diet, thin liquids  Aggression color: green  Isolation: none  Plan:possible discharge today to home with family

## 2022-11-12 NOTE — PLAN OF CARE
[id number]  Orientations: 4  Vitals/Pain: VSS on 2L NC  Will discharge on O2  GI/: incontinence  Ambulation/Assist: x1 w/ walker  Sleep Quality: good  Plan: discharge to home with wife. Patient verbalized understanding of discharge instructions.Son Irineo driving. Belongings sent with patient. No new medications.

## 2022-11-12 NOTE — PROGRESS NOTES
X-cover    Pt lost IV and refusing another.   - change doxycycline to oral for now    Camron Joe MD

## 2022-11-12 NOTE — PROGRESS NOTES
Care Management Discharge Note    Discharge Date: 11/12/2022       Discharge Disposition: Home Care    Discharge Services:      Discharge DME: Oxygen    Discharge Transportation: family or friend will provide    Private pay costs discussed: Not applicable    PAS Confirmation Code:    Patient/family educated on Medicare website which has current facility and service quality ratings: no    Education Provided on the Discharge Plan:    Persons Notified of Discharge Plans: RN  Patient/Family in Agreement with the Plan: yes    Handoff Referral Completed: No    Additional Information:  Pt discharging home today with spouse. Son here to transport home. LUIS informed pt's home care agency, [facility], [phone]. Orders faxed to [phone]. Per bed side RN, pt's home O2 orders have changed. Per chart review, he went home with [facility] last discharge. LUIS called and spoke with [place]. She states pt had his O2 picked up 10/21. LUIS called pt's wife who reports pt has purchased his own portable concentrator and they are set up for home O2.         PIPO [place], [facility]  [phone] Desk phone  [phone] Cell/text (Preferred)  [facility]

## 2022-11-12 NOTE — PROVIDER NOTIFICATION
IV infiltrated pt refused IV restart, IV doxycycline not given, education provided, MD mera, started on oral doxycycline

## 2022-11-14 ENCOUNTER — PATIENT OUTREACH (OUTPATIENT)
Dept: CARE COORDINATION | Facility: CLINIC | Age: 87
End: 2022-11-14

## 2022-11-14 ENCOUNTER — TELEPHONE (OUTPATIENT)
Dept: FAMILY MEDICINE | Facility: CLINIC | Age: 87
End: 2022-11-14

## 2022-11-14 ENCOUNTER — NURSE TRIAGE (OUTPATIENT)
Dept: FAMILY MEDICINE | Facility: CLINIC | Age: 87
End: 2022-11-14

## 2022-11-14 ENCOUNTER — TELEPHONE (OUTPATIENT)
Dept: CARDIOLOGY | Facility: CLINIC | Age: 87
End: 2022-11-14

## 2022-11-14 ENCOUNTER — DOCUMENTATION ONLY (OUTPATIENT)
Dept: HOME HEALTH SERVICES | Facility: CLINIC | Age: 87
End: 2022-11-14

## 2022-11-14 NOTE — TELEPHONE ENCOUNTER
Devi, Care Coordinator with [facility], states that they have no record of who patient gets oxygen from. Didi Lam RN

## 2022-11-14 NOTE — TELEPHONE ENCOUNTER
"Spoke with pt:   Pain clinic started pt on Lidocaine medication 9/29/17 for neuropathy- Dr. Janneth Foster CNP   Has blisters and \"pimples with heads\" all over both feet where put Lidocaine. Second day started getting a rash and stopped. Blisters have not gone away   Pt tried contacting pain clinic but they have not responded   Using Triamcinolone cream, has helped some but still having blisters.   Pt scheduled today with PCP   Pt would like to discuss alternative medication at upcoming OV     Yamilex EVERETT RN        " Daily Fractionated Dose (Optional- Include Units): 266.76 cGy

## 2022-11-14 NOTE — TELEPHONE ENCOUNTER
"Nurse Triage SBAR    Is this a 2nd Level Triage? NO    Situation: Patient wife calling to report that the patient's oxygen is running low. Patient was discharged from the hospital Saturday. Reports O2 sat 84-85 %. At one point reported 78 %. Patient states that it was in the 90's this morning.  Patient had his own oxygen canister and no tank. Concern is that he is running out of oxygen.     Background: Recently discharged after being admitted:    Acute hypoxic respiratory failure with hypotension, suspect septic shock due to aspiration pneumonia and also related to being off home oxygen.    Assessment: Hypoxia    Protocol Recommended Disposition:   No disposition on file. Call 911    Recommendation:      911    Does the patient meet one of the following criteria for ADS visit consideration? 16+ years old, with an [facility] PCP     TIP  Providers, please consider if this condition is appropriate for management at one of our Acute and Diagnostic Services sites.     If patient is a good candidate, please use dotphrase <dot>triageresponse and select Refer to ADS to document.    Answer Assessment - Initial Assessment Questions  1. RESPIRATORY STATUS: \"Describe your breathing?\" (e.g., wheezing, shortness of breath, unable to speak, severe coughing)       Able to speak. O2 sat is 84-85  2. ONSET: \"When did this breathing problem begin?\"       today  3. PATTERN \"Does the difficult breathing come and go, or has it been constant since it started?\"       Was discharged from the hospital on Saturday.   4. SEVERITY: \"How bad is your breathing?\" (e.g., mild, moderate, severe)     - MILD: No SOB at rest, mild SOB with walking, speaks normally in sentences, can lie down, no retractions, pulse < 100.     - MODERATE: SOB at rest, SOB with minimal exertion and prefers to sit, cannot lie down flat, speaks in phrases, mild retractions, audible wheezing, pulse 100-120.     - SEVERE: Very SOB at rest, speaks in single words, struggling to " "breathe, sitting hunched forward, retractions, pulse > 120       Patient denies SOB, but O2 sat is 84-85  5. RECURRENT SYMPTOM: \"Have you had difficulty breathing before?\" If Yes, ask: \"When was the last time?\" and \"What happened that time?\"       Trouble breathing.   6. CARDIAC HISTORY: \"Do you have any history of heart disease?\" (e.g., heart attack, angina, bypass surgery, angioplasty)       *No Answer*  7. LUNG HISTORY: \"Do you have any history of lung disease?\"  (e.g., pulmonary embolus, asthma, emphysema)      *No Answer*  8. CAUSE: \"What do you think is causing the breathing problem?\"       *No Answer*  9. OTHER SYMPTOMS: \"Do you have any other symptoms? (e.g., dizziness, runny nose, cough, chest pain, fever)      *No Answer*  10. O2 SATURATION MONITOR:  \"Do you use an oxygen saturation monitor (pulse oximeter) at home?\" If Yes, \"What is your reading (oxygen level) today?\" \"What is your usual oxygen saturation reading?\" (e.g., 95%)        *No Answer*  11. PREGNANCY: \"Is there any chance you are pregnant?\" \"When was your last menstrual period?\"        *No Answer*  12. TRAVEL: \"Have you traveled out of the country in the last month?\" (e.g., travel history, exposures)        *No Answer*    Protocols used: BREATHING DIFFICULTY-A-OH    Didi Lam RN    "

## 2022-11-14 NOTE — TELEPHONE ENCOUNTER
received a message from Dr. Grijalva, asking me to help patient and his wife with getting their appointment tomorrow rescheduled.     reached out to schedulers, and they were able to cancel the appointment, but will need to wait until tomorrow to contact patient\spouse to get it rescheduled.     then contacted Mrs. Velasquez and informed her of the above.  She states they have a lot of appointments right now, and she just can't keep up.  She would appreciate the call tomorrow to get rescheduled.     will follow up with  team.    Brenda Ventura RN on 11/14/2022 at 5:00 PM

## 2022-11-14 NOTE — PROGRESS NOTES
Clinic Care Coordination Contact  [facility]: Post-Discharge Note  SITUATION                                                      Admission:    Admission Date: 11/06/22   Reason for Admission: Acute hypoxic respiratory failure with hypotension, suspect septic shock due to aspiration pneumonia and also related to being off home oxygen.  Chronic dysphagia.  metabolic encephalopathy due to sepsis  Acute kidney injury on CKD3  Discharge:   Discharge Date: 11/12/22  Discharge Diagnosis: Acute hypoxic respiratory failure with hypotension, suspect septic shock due to aspiration pneumonia and also related to being off home oxygen.  Chronic dysphagia.  metabolic encephalopathy due to sepsis  Acute kidney injury on CKD3    BACKGROUND                                                      Per hospital discharge summary and inpatient provider notes:    Mr. 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  He presents with the above issues.  Please note that the patient is unable to provide any history at this time given impaired mentation.       The patient typically uses oxygen when he is sleeping.  However, apparently he has issues with oxygen falling off at times  Today, he was found unresponsive and with his oxygen off.  Paramedics were called and he had oxygen level of 41% initially.  He was subsequently brought to [facility] for further evaluation.     The patient was seen in the ER by Dr. Castano.  On  "initial evaluation, temperature 97.9, heart rate 120, blood pressure 66/40, respiratory rate 20, oxygen saturations were in the 80s on room air.  He was placed on oxygen up to 10 liters.  Laboratory evaluation showed white count 12.6, hemoglobin 10.4, platelets 172; BMP showed notable for a creatinine 1.77 (of note, creatinine was normal on 10/17/2022) NTP-BNP 5267; lactate 2.3, procalcitonin 1.23; TSH 4.36, but free T4 was normal at 0.97; ABG showed a pH of 7.23, pCO2 of 65, pO2 is 40; COVID, influenza  RSV testing were negative.  Chest x-ray that showed previously seen infiltrates nearly resolved with no definite new infiltrates.       In the ER, he was given IV fluids with some improvement of blood pressure into the 80s and 90s.  He was given broad antibiotics consisting of cefepime and doxycycline and he has been ordered vancomycin.  Given his issues, request for admission was made.    ASSESSMENT      Discharge Assessment  How are you doing now that you are home?: \"Today he fell\"  How are your symptoms? (Red Flag symptoms escalate to triage hotline per guidelines): Improved  Do you feel your condition is stable enough to be safe at home until your provider visit?: Yes  Does the patient have their discharge instructions? : Yes  Does the patient have questions regarding their discharge instructions? : No  Were you started on any new medications or were there changes to any of your previous medications? : Yes  Does the patient have all of their medications?: Yes  Do you have questions regarding any of your medications? : No  Do you have all of your needed medical supplies or equipment (DME)?  (i.e. oxygen tank, CPAP, cane, etc.): Yes  Discharge follow-up appointment scheduled within 14 calendar days? : No (No available in person appointments within the time frame)  Is patient agreeable to assistance with scheduling? : Yes    Post-op (W CTA Only)  If the patient had a surgery or procedure, do they have any " questions for a nurse?: No    PLAN                                                      Outpatient Plan:    Follow up with primary care provider, Karson Bishop, within 7 days for   hospital follow- up.  No follow up labs or test are needed.      Future Appointments   Date Time Provider Department Center   11/15/2022  8:45 AM Tab Grijalva MD [facility]   11[facility]15/2022  1:00 [facility]   12/14/2022  9:00 AM Karson Bishop MD [facility]         For any urgent concerns, please contact our 24 hour nurse triage line: [phone] ([phone])         DENG Delgado  [phone]  Connected Care Resource [facility]

## 2022-11-14 NOTE — PROGRESS NOTES
Recceived email from my supervisor that Shantanu wife keeps calling one of our billers renata stating that they need oxygen ASAP. From records patient asked us to  his oxygen equipment on 10/21/2022.   -Nurse Crissy at [facility] informed us on 11/12/2022 that she had talked to patient and patients wife, and they had bought their own concentrator, so nothing was needed from us.    I called the patient, and I spoke to patients wife, she let me know his o2 sats are dropping really low. I asked her what type of equipment they purchased for his oxygen.  His wife let me know it was one of those portable units you see on TV the [employer] I let her know that those units are not continuous flow, and that that unit may not work for him.  I let her know the patient would need to re-qualify for home oxygen(new order and f2f). She said oh, I cant just talk to his Dr. and have them put an order in, I said well yes, but he would need to have at least a virtual visit since she told me pt refuses to go back to hospital, or go in.   She is aware to call clinic to schedule virtual visit and get new oxygen order      3:53pm- received teams message from Dione BARFIELD, and she asked me to review qualifications from hospital stay to see if that qualified patient for home oxygen.  - I reviewed chart; all documents present in the chart including valid order for home oxygen.   I emailed my co worker to contact the patient to get them setup ASAP

## 2022-11-14 NOTE — TELEPHONE ENCOUNTER
Pt has home concentrator, but not enough for 24/7 oxygen.  Pt is needing home 02 for 24 hour oxygen.  Calling [facility] which pt used before for oxygen at  [phone]   Spoke to Maria A at [facility] and they will be setting oxygen up for this pt today.    Dione Mcgregor, RN  [facility] RN Triage Team

## 2022-11-14 NOTE — PHARMACY-ADMISSION MEDICATION HISTORY
Pharmacy Medication History  Admission medication history interview status for the 10/9/2022  admission is complete. See EPIC admission navigator for prior to admission medications     Location of Interview: Patient room  Medication history sources: Patient, Patient's family/friend (spouse), Surescripts and [facility]    Significant changes made to the medication list:  Patient is not taking Eliquis and Lipitor    In the past week, patient estimated taking medication this percent of the time: greater than 90%    Additional medication history information:   Eliquis was discontinued due to GI bleed. Patient and wife state they were told to also discontinue the Lipitor but I see no note stating this should be stopped, but patient has not been taking at home.     Medication reconciliation completed by provider prior to medication history? Yes    Time spent in this activity: 25 minutes    Prior to Admission medications    Medication Sig Last Dose Taking? Auth Provider Long Term End Date   acetaminophen (TYLENOL) 500 MG tablet Take 1,000 mg by mouth 3 times daily as needed Past Week at prn Yes Reported, Patient     albuterol (PROAIR HFA/PROVENTIL HFA/VENTOLIN HFA) 108 (90 Base) MCG/ACT inhaler Inhale 2 puffs into the lungs every 6 hours as needed Past Week at prn Yes Reported, Patient Yes    albuterol (PROVENTIL) (2.5 MG/3ML) 0.083% neb solution Take 1 vial (2.5 mg) by nebulization every 6 hours as needed for shortness of breath / dyspnea or wheezing Past Week at prn Yes Karson Bishop MD Yes    alfuzosin ER (UROXATRAL) 10 MG 24 hr tablet Take 1 tablet (10 mg) by mouth daily Past Week Yes Karson Bishop MD     famotidine (PEPCID) 40 MG tablet Take 40 mg by mouth 2 times daily Past Week Yes Unknown, Entered By History     ferrous sulfate (FE TABS) 325 (65 Fe) MG EC tablet Take 1 tablet (325 mg) by mouth 2 times daily Past Week Yes Reported, Patient     fluticasone-vilanterol (BREO ELLIPTA) 200-25 MCG/INH inhaler  [Home] : at home, [unfilled] , at the time of the visit. Inhale 1 puff into the lungs daily Past Week Yes Unknown, Entered By History     gabapentin (NEURONTIN) 100 MG capsule Take 2 capsules (200 mg) by mouth At Bedtime Past Week Yes Loni Yuen MD Yes    metoprolol tartrate (LOPRESSOR) 25 MG tablet Take 1 tablet (25 mg) by mouth 2 times daily Past Week Yes Loni Yuen MD Yes    OLANZapine (ZYPREXA) 5 MG tablet Take 1 tablet (5 mg) by mouth 2 times daily as needed (cognitive impairment) Past Week Yes Unknown, Entered By History No    tiotropium (SPIRIVA) 18 MCG inhaled capsule Inhale 18 mcg into the lungs daily Past Week Yes Unknown, Entered By History No    apixaban ANTICOAGULANT (ELIQUIS) 5 MG tablet Take 1 tablet (5 mg) by mouth 2 times daily  Patient not taking: Reported on 10/9/2022 Not Taking  Unknown, Entered By History No    atorvastatin (LIPITOR) 10 MG tablet Take 1 tablet (10 mg) by mouth daily  Patient not taking: Reported on 10/9/2022 Not Taking  Karson Bishop MD Yes        The information provided in this note is only as accurate as the sources available at the time of update(s)      [Medical Office: ([facility])___] : at the medical office located in  [FreeTextEntry3] : mother

## 2022-11-15 LAB
ATRIAL RATE - MUSE: 68 BPM
DIASTOLIC BLOOD PRESSURE - MUSE: NORMAL MMHG
INTERPRETATION ECG - MUSE: NORMAL
P AXIS - MUSE: 65 DEGREES
PR INTERVAL - MUSE: 336 MS
QRS DURATION - MUSE: 144 MS
QT - MUSE: 436 MS
QTC - MUSE: 463 MS
R AXIS - MUSE: -20 DEGREES
SYSTOLIC BLOOD PRESSURE - MUSE: NORMAL MMHG
T AXIS - MUSE: 54 DEGREES
VENTRICULAR RATE- MUSE: 68 BPM

## 2022-11-16 ENCOUNTER — TELEPHONE (OUTPATIENT)
Dept: FAMILY MEDICINE | Facility: CLINIC | Age: 87
End: 2022-11-16

## 2022-11-16 ENCOUNTER — MEDICAL CORRESPONDENCE (OUTPATIENT)
Dept: HEALTH INFORMATION MANAGEMENT | Facility: CLINIC | Age: 87
End: 2022-11-16

## 2022-11-16 NOTE — TELEPHONE ENCOUNTER
PCP, please advice if appt is needed to update medications. If yes, can VV be scheduled? RN is requesting a call back with advice if pt should continue taking mediations as reported below.

## 2022-11-16 NOTE — TELEPHONE ENCOUNTER
Simran from [facility] called with the following information.    1) FYALESHIA-Pt had a fall prior to home care visit today. Pt has no injury but has a skin scrape on right knee. No other concerns re: fall.    2)Nurse also wants to make PCP aware pt has not been taking     metoprolol tartrate (LOPRESSOR) 25 MG tablet.  finasteride (PROSCAR) 5 MG tablet  bumetanide (BUMEX) 0.5 MG tablet    Pt does not have medications above at home and needs a refill sent to her [facility] pharmacy if pt is to continuetaking them.    Nurse also needs a call back with advice if pt should continue taking medication above.     3)ZAHRA- Pt has Gabapentin 300 mg capsules at home. He has been taking -2 capsules (600 mg) by mouth At Bedtime. Per med list, pt should be taking- 2 capsules (200 mg) by mouth At Bedtime.    Pt has been taking Famotidine 80 mg in the morning and 40 mg at night.    He has an old scripts that he has been taking for hydroxyzine 25 mg tablet. He takes one tablet in the morning and one tablet at night.      Ok to have continue taking Gabapentin, Famotidine and Hydroxyzine as he has been taking?       RN will need a call back with providers advice. Ok to leave a message at [phone].

## 2022-11-16 NOTE — TELEPHONE ENCOUNTER
I would advise that he take the medications as prescribed by the discharging provider and schedule a hospital follow up appointment with me as soon as possible

## 2022-11-20 ENCOUNTER — MEDICAL CORRESPONDENCE (OUTPATIENT)
Dept: HEALTH INFORMATION MANAGEMENT | Facility: CLINIC | Age: 87
End: 2022-11-20

## 2022-11-22 ENCOUNTER — TELEPHONE (OUTPATIENT)
Dept: FAMILY MEDICINE | Facility: CLINIC | Age: 87
End: 2022-11-22

## 2022-11-22 NOTE — TELEPHONE ENCOUNTER
Call to Simran from [facility]. Simran reports she will need to be called back later. Informed Simran that nursing will call back again when able.       Call to patient. Patient informed of Dr. Bishop's below response. Patient already has an appointment scheduled for 12/14/2022. First available appointment.     Please advise if 12/14/2022 is soon enough or if patient should schedule with team or if patient can be worked into the schedule with Dr. Bishop to be seen sooner.       Appointments in Next Year    Dec 02, 2022  9:15 AM  (Arrive by 9:10 AM)  New Cardiology with Tab Grijalva MD  [facility] ([facility] ) [phone]   Dec 14, 2022  9:00 AM  (Arrive by 8:45 AM)  ED/Hospital Follow Up with Karson Bishop MD  [facility] ([facility] ) [phone]          Thank you!      Lidya Vela, RN BSN MSN  [facility]

## 2022-11-22 NOTE — TELEPHONE ENCOUNTER
I was under the impression that our clinic policy is to provide a hospital follow up visit for our patient with their PCP within 1-2 weeks of hospital discharge.  I thought that this was to be prioritized.  Yet, I have two new patients on my schedule today.   I think we should schedule this patient who is a the highest possible risk for re-hospitalization sooner than mid December.  If there are no slots next week, the please reschedule a new patient.

## 2022-11-22 NOTE — TELEPHONE ENCOUNTER
Hospital follow-up appointment was rescheduled for tomorrow, 11/23/2022.     Call to Simran with [facility]. Simran informed of Dr. Bishop's response below.      Simran verbalized understanding and reports concern that the patient does not have enough medication. Simran also reports there is concern that the patient has some confusion and is not taking his medications correctly.       Call to patient. Message left for return call to clinic.     When patient returns call, please ask if they have enough medications at this time and ensure patient is aware of tomorrow's appointment (appointment was re-scheduled by staff without documentation of conversation with patient).       Routing to provider as well.   Dr. Bishop, patient will see you in clinic tomorrow for hospital follow-up.     Appears hospital provider had sent 30 tablets with 0 refills of medications on 10/18/2022. Patient may be out of medication. Okay to wait to discuss with you tomorrow or should refills be sent in today?         Lidya Vela RN BSN MSN  [facility]

## 2022-11-22 NOTE — TELEPHONE ENCOUNTER
[facility] appointment no showed, we made one more attempt to reschedule.     Routing back to referring provider and [facility] pharmacist.       Dell Nowak [facility]

## 2022-11-23 ENCOUNTER — OFFICE VISIT (OUTPATIENT)
Dept: FAMILY MEDICINE | Facility: CLINIC | Age: 87
End: 2022-11-23
Payer: COMMERCIAL

## 2022-11-23 VITALS
WEIGHT: 156.9 LBS | BODY MASS INDEX: 23.78 KG/M2 | DIASTOLIC BLOOD PRESSURE: 50 MMHG | RESPIRATION RATE: 16 BRPM | HEIGHT: 68 IN | OXYGEN SATURATION: 92 % | TEMPERATURE: 98.9 F | SYSTOLIC BLOOD PRESSURE: 111 MMHG | HEART RATE: 76 BPM

## 2022-11-23 DIAGNOSIS — J18.9 PNEUMONIA OF LOWER LOBE DUE TO INFECTIOUS ORGANISM, UNSPECIFIED LATERALITY: ICD-10-CM

## 2022-11-23 DIAGNOSIS — R29.6 FALLS FREQUENTLY: ICD-10-CM

## 2022-11-23 DIAGNOSIS — Z23 HIGH PRIORITY FOR 2019-NCOV VACCINE: ICD-10-CM

## 2022-11-23 DIAGNOSIS — Z23 NEED FOR PROPHYLACTIC VACCINATION AND INOCULATION AGAINST INFLUENZA: ICD-10-CM

## 2022-11-23 DIAGNOSIS — R09.02 HYPOXIA: Primary | ICD-10-CM

## 2022-11-23 LAB
ALBUMIN UR-MCNC: ABNORMAL MG/DL
APPEARANCE UR: CLEAR
BILIRUB UR QL STRIP: NEGATIVE
COLOR UR AUTO: YELLOW
GLUCOSE UR STRIP-MCNC: NEGATIVE MG/DL
HGB UR QL STRIP: NEGATIVE
KETONES UR STRIP-MCNC: NEGATIVE MG/DL
LEUKOCYTE ESTERASE UR QL STRIP: NEGATIVE
NITRATE UR QL: NEGATIVE
PH UR STRIP: 5.5 [PH] (ref 5–7)
RBC #/AREA URNS AUTO: NORMAL /HPF
SP GR UR STRIP: 1.02 (ref 1–1.03)
UROBILINOGEN UR STRIP-ACNC: 0.2 E.U./DL
WBC #/AREA URNS AUTO: NORMAL /HPF

## 2022-11-23 PROCEDURE — 0124A COVID-19 VACCINE BIVALENT BOOSTER 12+ (PFIZER): CPT | Performed by: INTERNAL MEDICINE

## 2022-11-23 PROCEDURE — 91312 COVID-19 VACCINE BIVALENT BOOSTER 12+ (PFIZER): CPT | Performed by: INTERNAL MEDICINE

## 2022-11-23 PROCEDURE — 90662 IIV NO PRSV INCREASED AG IM: CPT | Performed by: INTERNAL MEDICINE

## 2022-11-23 PROCEDURE — 99214 OFFICE O/P EST MOD 30 MIN: CPT | Mod: 25 | Performed by: INTERNAL MEDICINE

## 2022-11-23 PROCEDURE — G0008 ADMIN INFLUENZA VIRUS VAC: HCPCS | Performed by: INTERNAL MEDICINE

## 2022-11-23 PROCEDURE — 81001 URINALYSIS AUTO W/SCOPE: CPT | Performed by: INTERNAL MEDICINE

## 2022-11-23 ASSESSMENT — PAIN SCALES - GENERAL: PAINLEVEL: NO PAIN (0)

## 2022-11-23 NOTE — PROGRESS NOTES
Assessment & Plan     Hypoxia  Pneumonia of lower lobe due to infectious organism, unspecified laterality  Falls frequently  High priority for 2019-nCoV vaccine  - COVID-19,PF,PFIZER BOOSTER BIVALENT 12+Yrs  Need for prophylactic vaccination and inoculation against influenza    - INFLUENZA, QUAD, HIGH DOSE, PF, 65YR + (FLUZONE HD)  - ADMIN INFLUENZA (For MEDICARE Patients ONLY) []    This is a difficult situation.  Chapito's goal is to remain independent within his home.   I spent the majority of the clinic visit time explaining to him that he needs to use his supplemental oxygen when he is walking.  His room air oxygen sat is okay at rest.  He would probably benefit from using a walker and we will try to convince him to do so at subsequent visits.  Overall, his long-term prognosis is guarded.      30 minutes spent on the date of the encounter doing chart review, history and exam, documentation and further activities per the note     MED REC REQUIRED  Post Medication Reconciliation Status:           Return in about 1 month (around 12/23/2022) for F2F recheck, OK to use reserved slot .   Patient instructed to return to clinic or contact us sooner if symptoms worsen or new symptoms develop.     Karson Bishop MD  [facility] RENETTA Akhtar is a 89 year old, presenting for the following health issues:  Hospital F/U, Imm/Inj (COVID-19 VACCINE), and Imm/Inj (Flu Shot)      HPI     Post Discharge Outreach 11/14/2022   Admission Date 11/6/2022   Reason for Admission Acute hypoxic respiratory failure with hypotension, suspect septic shock due to aspiration pneumonia and also related to being off home oxygen.  Chronic dysphagia.  metabolic encephalopathy due to sepsis  Acute kidney injury on CKD3   Discharge Date 11/12/2022   Discharge Diagnosis Acute hypoxic respiratory failure with hypotension, suspect septic shock due to aspiration pneumonia and also related to being off home oxygen.   "Chronic dysphagia.  metabolic encephalopathy due to sepsis  Acute kidney injury on CKD3   How are you doing now that you are home? \"Today he fell\"   How are your symptoms? (Red Flag symptoms escalate to triage hotline per guidelines) Improved   Do you feel your condition is stable enough to be safe at home until your provider visit? Yes   Does the patient have their discharge instructions?  Yes   Does the patient have questions regarding their discharge instructions?  No   Were you started on any new medications or were there changes to any of your previous medications?  Yes   Does the patient have all of their medications? Yes   Do you have questions regarding any of your medications?  No   Do you have all of your needed medical supplies or equipment (DME)?  (i.e. oxygen tank, CPAP, cane, etc.) Yes   Discharge follow-up appointment scheduled within 14 calendar days?  No   Discharge Follow Up Appointment Date -   Discharge Follow Up Appointment Scheduled with? -     Hospital Follow-up Visit:    Hospital/Nursing Home/IP Rehab Facility: [facility]  Date of Admission: 11/6/22  Date of Discharge: 11/12/22  Reason(s) for Admission:   Acute hypoxic respiratory failure with hypotension, suspect septic shock due to aspiration pneumonia and also related to being off home oxygen.  Chronic dysphagia.  metabolic encephalopathy due to sepsis  Acute kidney injury on CKD3  Hypernatremia due to hypertonic fluids and dehydration    Was your hospitalization related to COVID-19? No   Problems taking medications regularly:  None  Medication changes since discharge: None  Problems adhering to non-medication therapy:  None    Summary of hospitalization:  [facility] discharge summary reviewed  Diagnostic Tests/Treatments reviewed.  Follow up needed: me in 3-4 weeks                       Other Healthcare Providers Involved in Patient s Care:         None  Update since discharge: improved.   Plan of " "care communicated with patient     Hermilo is a 89-year-old man familiar to me who lives in independently in a private home with his wife.  He was recently hospitalized and treated for severe sepsis from suspected aspiration pneumonia.  He was found down with severe hypoxia.  He was not using his supplemental oxygen.  This after recently being hospitalized for pneumonia and severe anemia.  He has no new specific complaints today.  It seems he has been having falls.  He does have home care services.  A dysphagia diet was recommended to him, but he has declined.  He is very hard of hearing.    Review of Systems         Objective    /50 (BP Location: Right arm, Patient Position: Sitting, Cuff Size: Adult Regular)   Pulse 76   Temp 98.9  F (37.2  C) (Tympanic)   Resp 16   Ht 1.727 m (5' 8\")   Wt 71.2 kg (156 lb 14.4 oz)   SpO2 92%   BMI 23.86 kg/m    Body mass index is 23.86 kg/m .  Physical Exam   General: This is a well-appearing man in no acute distress.  He is sitting in a wheelchair when I walked into the exam room.  He is not wearing supplemental oxygen.  He is breathing comfortably.  Cardiovascular: The heart has an irregularly irregular rhythm with a normal rate.  Pulmonary: The lungs are clear to auscultation bilaterally with the exception of postsurgical changes from his lung cancer surgery.  There is no wheezing.  There is no use of accessory muscles.  Extremities: Well perfused and without edema.  Neurological: Very hard of hearing but alert and oriented to person place and time, cranial nerves II to XII appear grossly intact, I did walk with him in the hallways of the clinic.  He does seem to have a reasonably steady gait with his cane.  He was able to independently get out of his wheelchair.  His room air oxygen sat dropped down to 86% after the walk.  He did not have any supplemental oxygen with him but with rest his room air oxygen sat improved to the normal range.                    "

## 2022-11-23 NOTE — PATIENT INSTRUCTIONS
Use your oxygen 2 liters per minutes when you are up and walking around.  It is OK to not use oxygen when you are sitting and resting.  Return to see me in 3-4 weeks and we will retest your oxygen and see if you can get rid of the oxygen.  It can sometime take several months after you recover from a pneumonia to be able to get rid of oxygen.

## 2022-11-29 ENCOUNTER — MEDICAL CORRESPONDENCE (OUTPATIENT)
Dept: HEALTH INFORMATION MANAGEMENT | Facility: CLINIC | Age: 87
End: 2022-11-29

## 2022-12-02 ENCOUNTER — OFFICE VISIT (OUTPATIENT)
Dept: CARDIOLOGY | Facility: CLINIC | Age: 87
End: 2022-12-02
Payer: COMMERCIAL

## 2022-12-02 VITALS
SYSTOLIC BLOOD PRESSURE: 121 MMHG | HEIGHT: 68 IN | DIASTOLIC BLOOD PRESSURE: 60 MMHG | WEIGHT: 158.9 LBS | BODY MASS INDEX: 24.08 KG/M2 | HEART RATE: 86 BPM | OXYGEN SATURATION: 90 %

## 2022-12-02 DIAGNOSIS — Z95.2 HISTORY OF TRANSCATHETER AORTIC VALVE REPLACEMENT (TAVR): Primary | ICD-10-CM

## 2022-12-02 PROCEDURE — 99215 OFFICE O/P EST HI 40 MIN: CPT | Performed by: INTERNAL MEDICINE

## 2022-12-02 NOTE — PROGRESS NOTES
HPI and Plan:   Mr. 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  Today patient is coming for routine follow-up.  He had a very rough clinical course this year.  He had several hospitalization.  The most recent one was a few weeks ago with acute hypoxic respiratory failure due to septic shock aspiration pneumonia, he also has chronic dysphagia and also had acute renal failure on chronic kidney disease.  He is now on home oxygen.  Recent echocardiogram showed normal LV systolic function, mildly reduced RV systolic function, mean gradients of 19 mmHg across the prosthetic aortic valve, this is an increase from 40 mils mercury previously.  He had another hospitalization in October due to severe sepsis and acute metabolic encephalopathy due to sepsis.  In September 2022 he was admitted with anemia and GI bleed.  In July 2022 he was admitted with the acute hypoxic respiratory failure with acute right-sided failure with acute renal failure acute metabolic encephalopathy.  At that time echocardiogram showed evidence of RV volume and pressure overload and acute cor pulmonale.  And as noted above subsequently echocardiogram showed improvement in RV systolic function.  He is now at home using oxygen.  He has no specific cardiac complaints no chest discomfort shortness of breath.  He is maintaining his weight is around 159 pounds on low-dose Bumex.  Recent EKG shows sinus rhythm with PACs.  He is on metoprolol.      Assessment plan  1.  S/p bioprosthetic aortic valve replacement  2015.  Mean gains of 19 m of mercury on recent echocardiogram.  Faint ejection systolic murmur over the right upper sternal border.  Normal LV function.  Not on aspirin due to significant recurrent GI bleed.  Normal LV systolic function.  2.  Mildly reduced LV systolic function recent echocardiogram.  Clinically does not appear in congestive heart failure  3.  Paroxysmal atrial fibrillation CHADS2 Vascor of at least 3.  Not on anticoagulation due to recurrent GI bleed.  On beta-blocker.  Rhythm appears regular.  Recent EKG shows sinus rhythm with PACs.  4.  Recent several hospitalization due to noncardiac issue with hypoxic respiratory failure sepsis metabolic encephalopathy.    Recommendations  Overall patient appears euvolemic not in decompensated congestive heart failure  Agree with staying off anticoagulation due to significant recurrent GI bleed.  Discussed option of watchman with procedure and with overall his comorbidities patient does not want invasive procedure and I think this is very reasonable.  Follow-up in 1 year with an echocardiogram.      45 minutes of total time spent including review of recent several hospitalizations.          Orders Placed This Encounter   Procedures     Follow-Up with Cardiology     Echocardiogram Complete       No orders of the defined types were placed in this encounter.      There are no discontinued medications.      Encounter Diagnosis   Name Primary?     History of transcatheter aortic valve replacement (TAVR) Yes       CURRENT MEDICATIONS:  Current Outpatient Medications   Medication Sig Dispense Refill     acetaminophen (TYLENOL) 500 MG tablet Take 1,000 mg by mouth 3 times daily as needed       albuterol (PROAIR HFA/PROVENTIL HFA/VENTOLIN HFA) 108 (90 Base) MCG/ACT inhaler Inhale 2 puffs into the lungs every 6 hours as needed       albuterol (PROVENTIL) (2.5 MG/3ML) 0.083% neb solution Take 1 vial (2.5 mg) by nebulization every 6 hours as needed for shortness of breath  "/ dyspnea or wheezing 180 mL 11     atorvastatin (LIPITOR) 10 MG tablet Take 1 tablet (10 mg) by mouth daily 90 tablet 2     bumetanide (BUMEX) 0.5 MG tablet Take 1 tablet (0.5 mg) by mouth daily 30 tablet 0     famotidine (PEPCID) 40 MG tablet Take 40 mg by mouth 2 times daily       ferrous sulfate (FE TABS) 325 (65 Fe) MG EC tablet Take 1 tablet (325 mg) by mouth 2 times daily       finasteride (PROSCAR) 5 MG tablet Take 1 tablet (5 mg) by mouth daily 30 tablet 0     fluticasone-vilanterol (BREO ELLIPTA) 200-25 MCG/INH inhaler Inhale 1 puff into the lungs daily       gabapentin (NEURONTIN) 100 MG capsule Take 2 capsules (200 mg) by mouth At Bedtime       metoprolol tartrate (LOPRESSOR) 25 MG tablet Take 1 tablet (25 mg) by mouth 2 times daily       tamsulosin (FLOMAX) 0.4 MG capsule Take 1 capsule (0.4 mg) by mouth every evening 30 capsule 0     tiotropium (SPIRIVA) 18 MCG inhaled capsule Inhale 18 mcg into the lungs daily         ALLERGIES     Allergies   Allergen Reactions     Omeprazole Itching     Pantoprazole Itching     Prevacid [Lansoprazole] Itching     Lasix [Furosemide] Rash     Lidocaine Blisters and Rash     Allergy to lidocaine ointment  Allergy to lidocaine ointment       Penicillin G Rash     Penicillins Rash     \"broke out from injection\" 60 yrs ago  Tolerates cephalosporins       PAST MEDICAL HISTORY:  Past Medical History:   Diagnosis Date     Adenocarcinoma, lung, 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 Vasques      Aortic stenosis     Severe AS, 9/2015 AVR - [facility] TRIFECTA Bovine bioprosthesis 25MM TF-25A     Atrial fibrillation (H)     9/2015 Paroxysmal post op Afib - discharged on Warfarin and a beta blocker     COPD (chronic obstructive pulmonary disease) (H)      Deep vein thrombosis (H)      GERD (gastroesophageal reflux disease)      Heart murmur      Monoclonal gammopathy     plasmacyte prominent causing monoclonal gammopathy     Need for SBE (subacute bacterial " endocarditis) prophylaxis      Neuropathy      Other and unspecified hyperlipidemia      Other malignant lymphomas     non hodgkin's lymphoma     RBBB (right bundle branch block)      Severe sepsis with acute organ dysfunction (H) 11/16/2015     Unspecified hereditary and idiopathic peripheral neuropathy        PAST SURGICAL HISTORY:  Past Surgical History:   Procedure Laterality Date     APPENDECTOMY       ARTHROPLASTY KNEE Right 7/25/2022    Procedure: RIGHT TOTAL KNEE ARTHROPLASTY;  Surgeon: Giuliano Deshpande MD;  Location:  OR     AS TOTAL KNEE ARTHROPLASTY       BACK SURGERY       ESOPHAGOSCOPY, GASTROSCOPY, DUODENOSCOPY (EGD), COMBINED N/A 11/28/2015    Procedure: COMBINED ESOPHAGOSCOPY, GASTROSCOPY, DUODENOSCOPY (EGD);  Surgeon: Danis Castillo MD;  Location:  GI     ESOPHAGOSCOPY, GASTROSCOPY, DUODENOSCOPY (EGD), COMBINED N/A 7/26/2018    Procedure: COMBINED ESOPHAGOSCOPY, GASTROSCOPY, DUODENOSCOPY (EGD);;  Surgeon: Toby Dong DO;  Location:  GI     ESOPHAGOSCOPY, GASTROSCOPY, DUODENOSCOPY (EGD), COMBINED N/A 8/17/2020    Procedure: ESOPHAGOGASTRODUODENOSCOPY (EGD);  Surgeon: Herrera Henry MD;  Location:  GI     ESOPHAGOSCOPY, GASTROSCOPY, DUODENOSCOPY (EGD), COMBINED N/A 10/24/2020    Procedure: ESOPHAGOGASTRODUODENOSCOPY (EGD);  Surgeon: Vick Florentino MD;  Location:  GI     ESOPHAGOSCOPY, GASTROSCOPY, DUODENOSCOPY (EGD), COMBINED N/A 4/11/2022    Procedure: ESOPHAGOGASTRODUODENOSCOPY (EGD);  Surgeon: Vick Florentino MD;  Location:  GI     ESOPHAGOSCOPY, GASTROSCOPY, DUODENOSCOPY (EGD), COMBINED N/A 8/26/2022    Procedure: ESOPHAGOGASTRODUODENOSCOPY (EGD);  Surgeon: El Mcgovern MD;  Location:  GI     HERNIA REPAIR  2006     HERNIORRHAPHY VENTRAL  4/17/2013    Procedure: HERNIORRHAPHY VENTRAL;  VENTRAL HERNIA REPAIR WITH MESH;  Surgeon: Patel Guzman MD;  Location:  OR     KNEE SURGERY      arthroscopic right knee surgery      LOBECTOMY LUNG  Right 3/26/2019    POSSIBLE LOBECTOMY LUNG per Dr. [facility]ques at [facility]     REPAIR LIGAMENT ANKLE  2012    Procedure:REPAIR LIGAMENT ANKLE; LEFT TARSAL TUNNEL RELEASE OF KNOT OF ARIEL RELEASE; Surgeon:SAUL PUENTE; Location:[facility] OR     REPLACE VALVE AORTIC N/A 9/3/2015    Procedure: REPLACE VALVE AORTIC;  Surgeon: Antonino Mitchell MD;  Location: [facility] OR     ROTATOR CUFF REPAIR RT/LT      bilateral     SPINE SURGERY      3 spine surgeries     THORACOTOMY, WEDGE RESECTION LUNG, COMBINED Right 3/26/2019    RIGHT THORACOTOMY, WEDGE RESECTION, RIGHT LOWER LOBE LUNG NODULE,;  Surgeon: Jose A Vasques MD;  Location: [facility] OR     TONSILLECTOMY       TURP         FAMILY HISTORY:  Family History   Problem Relation Age of Onset     C.A.D. Father      Emphysema Father      Melanoma No family hx of      Skin Cancer No family hx of        SOCIAL HISTORY:  Social History     Socioeconomic History     Marital status:      Spouse name: None     Number of children: None     Years of education: None     Highest education level: None   Tobacco Use     Smoking status: Former     Packs/day: 2.00     Years: 55.00     Pack years: 110.00     Types: Cigarettes     Quit date: 1998     Years since quittin.8     Smokeless tobacco: Never   Vaping Use     Vaping Use: Never used   Substance and Sexual Activity     Alcohol use: Not Currently     Drug use: No     Sexual activity: Not Currently     Partners: Female   Other Topics Concern     Caffeine Concern No     Comment: occ     Special Diet No     Exercise No     Seat Belt Yes   Social History Narrative    , [place]    Retired  - self employed       Review of Systems:  Skin:          Eyes:         ENT:         Respiratory:  Positive for dyspnea on exertion;shortness of breath     Cardiovascular:  Negative;palpitations;chest pain;syncope or near-syncope;cyanosis exercise intolerance;Positive for    Gastroenterology:    "     Genitourinary:         Musculoskeletal:         Neurologic:         Psychiatric:         Heme/Lymph/Imm:         Endocrine:           Physical Exam:  Vitals: /60   Pulse 86   Ht 1.727 m (5' 8\")   Wt 72.1 kg (158 lb 14.4 oz)   SpO2 90%   BMI 24.16 kg/m      General patient appears comfortable  Neck normal JV  Cardiovascular system grade 2 x 6 systolic murmur over the right upper sternal border, no S3-S4 rub or gallop normal rate  Respite system decreased air entry bilaterally  Extremities no pitting pedal edema  Neurological alert, oriented  HEENT pallor noted    CC  No referring provider defined for this encounter.              "

## 2022-12-02 NOTE — LETTER
12/2/2022    Karson Bishop MD  [address]  [place] [address]    RE: Hermilo Tatekevin       Dear Colleague,     I had the pleasure of seeing Hermilo Velasquez in the [facility].  HPI and Plan:   Mr. 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  Today patient is coming for routine follow-up.  He had a very rough clinical course this year.  He had several hospitalization.  The most recent one was a few weeks ago with acute hypoxic respiratory failure due to septic shock aspiration pneumonia, he also has chronic dysphagia and also had acute renal failure on chronic kidney disease.  He is now on home oxygen.  Recent echocardiogram showed normal LV systolic function, mildly reduced RV systolic function, mean gradients of 19 mmHg across the prosthetic aortic valve, this is an increase from 40 mils mercury previously.  He had another hospitalization in October due to severe sepsis and acute metabolic encephalopathy due to sepsis.  In September 2022 he was admitted with anemia and GI bleed.  In July 2022 he was admitted with the acute hypoxic respiratory failure with acute right-sided failure with acute renal failure acute metabolic encephalopathy.  At that time echocardiogram showed evidence of RV volume and pressure overload and acute cor pulmonale.  And as noted above subsequently echocardiogram showed improvement in RV systolic function.  He is now at home using oxygen.  He has no specific cardiac complaints no chest discomfort shortness of  breath.  He is maintaining his weight is around 159 pounds on low-dose Bumex.  Recent EKG shows sinus rhythm with PACs.  He is on metoprolol.      Assessment plan  1.  S/p bioprosthetic aortic valve replacement 2015.  Mean gains of 19 m of mercury on recent echocardiogram.  Faint ejection systolic murmur over the right upper sternal border.  Normal LV function.  Not on aspirin due to significant recurrent GI bleed.  Normal LV systolic function.  2.  Mildly reduced LV systolic function recent echocardiogram.  Clinically does not appear in congestive heart failure  3.  Paroxysmal atrial fibrillation CHADS2 Vascor of at least 3.  Not on anticoagulation due to recurrent GI bleed.  On beta-blocker.  Rhythm appears regular.  Recent EKG shows sinus rhythm with PACs.  4.  Recent several hospitalization due to noncardiac issue with hypoxic respiratory failure sepsis metabolic encephalopathy.    Recommendations  Overall patient appears euvolemic not in decompensated congestive heart failure  Agree with staying off anticoagulation due to significant recurrent GI bleed.  Discussed option of watchman with procedure and with overall his comorbidities patient does not want invasive procedure and I think this is very reasonable.  Follow-up in 1 year with an echocardiogram.      45 minutes of total time spent including review of recent several hospitalizations.          Orders Placed This Encounter   Procedures     Follow-Up with Cardiology     Echocardiogram Complete       No orders of the defined types were placed in this encounter.      There are no discontinued medications.      Encounter Diagnosis   Name Primary?     History of transcatheter aortic valve replacement (TAVR) Yes       CURRENT MEDICATIONS:  Current Outpatient Medications   Medication Sig Dispense Refill     acetaminophen (TYLENOL) 500 MG tablet Take 1,000 mg by mouth 3 times daily as needed       albuterol (PROAIR HFA/PROVENTIL HFA/VENTOLIN HFA) 108 (90 Base)  "MCG/ACT inhaler Inhale 2 puffs into the lungs every 6 hours as needed       albuterol (PROVENTIL) (2.5 MG/3ML) 0.083% neb solution Take 1 vial (2.5 mg) by nebulization every 6 hours as needed for shortness of breath / dyspnea or wheezing 180 mL 11     atorvastatin (LIPITOR) 10 MG tablet Take 1 tablet (10 mg) by mouth daily 90 tablet 2     bumetanide (BUMEX) 0.5 MG tablet Take 1 tablet (0.5 mg) by mouth daily 30 tablet 0     famotidine (PEPCID) 40 MG tablet Take 40 mg by mouth 2 times daily       ferrous sulfate (FE TABS) 325 (65 Fe) MG EC tablet Take 1 tablet (325 mg) by mouth 2 times daily       finasteride (PROSCAR) 5 MG tablet Take 1 tablet (5 mg) by mouth daily 30 tablet 0     fluticasone-vilanterol (BREO ELLIPTA) 200-25 MCG/INH inhaler Inhale 1 puff into the lungs daily       gabapentin (NEURONTIN) 100 MG capsule Take 2 capsules (200 mg) by mouth At Bedtime       metoprolol tartrate (LOPRESSOR) 25 MG tablet Take 1 tablet (25 mg) by mouth 2 times daily       tamsulosin (FLOMAX) 0.4 MG capsule Take 1 capsule (0.4 mg) by mouth every evening 30 capsule 0     tiotropium (SPIRIVA) 18 MCG inhaled capsule Inhale 18 mcg into the lungs daily         ALLERGIES     Allergies   Allergen Reactions     Omeprazole Itching     Pantoprazole Itching     Prevacid [Lansoprazole] Itching     Lasix [Furosemide] Rash     Lidocaine Blisters and Rash     Allergy to lidocaine ointment  Allergy to lidocaine ointment       Penicillin G Rash     Penicillins Rash     \"broke out from injection\" 60 yrs ago  Tolerates cephalosporins       PAST MEDICAL HISTORY:  Past Medical History:   Diagnosis Date     Adenocarcinoma, lung, 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 Vasques      Aortic stenosis     Severe AS, 9/2015 AVR - [facility] TRIFECTA Bovine bioprosthesis 25MM TF-25A     Atrial fibrillation (H)     9/2015 Paroxysmal post op Afib - discharged on Warfarin and a beta blocker     COPD (chronic obstructive pulmonary disease) (H)  "     Deep vein thrombosis (H)      GERD (gastroesophageal reflux disease)      Heart murmur      Monoclonal gammopathy     plasmacyte prominent causing monoclonal gammopathy     Need for SBE (subacute bacterial endocarditis) prophylaxis      Neuropathy      Other and unspecified hyperlipidemia      Other malignant lymphomas     non hodgkin's lymphoma     RBBB (right bundle branch block)      Severe sepsis with acute organ dysfunction (H) 11/16/2015     Unspecified hereditary and idiopathic peripheral neuropathy        PAST SURGICAL HISTORY:  Past Surgical History:   Procedure Laterality Date     APPENDECTOMY       ARTHROPLASTY KNEE Right 7/25/2022    Procedure: RIGHT TOTAL KNEE ARTHROPLASTY;  Surgeon: Giuliano Deshpande MD;  Location:  OR     AS TOTAL KNEE ARTHROPLASTY       BACK SURGERY       ESOPHAGOSCOPY, GASTROSCOPY, DUODENOSCOPY (EGD), COMBINED N/A 11/28/2015    Procedure: COMBINED ESOPHAGOSCOPY, GASTROSCOPY, DUODENOSCOPY (EGD);  Surgeon: Danis Castillo MD;  Location:  GI     ESOPHAGOSCOPY, GASTROSCOPY, DUODENOSCOPY (EGD), COMBINED N/A 7/26/2018    Procedure: COMBINED ESOPHAGOSCOPY, GASTROSCOPY, DUODENOSCOPY (EGD);;  Surgeon: Toby Dong DO;  Location:  GI     ESOPHAGOSCOPY, GASTROSCOPY, DUODENOSCOPY (EGD), COMBINED N/A 8/17/2020    Procedure: ESOPHAGOGASTRODUODENOSCOPY (EGD);  Surgeon: Herrera Henry MD;  Location:  GI     ESOPHAGOSCOPY, GASTROSCOPY, DUODENOSCOPY (EGD), COMBINED N/A 10/24/2020    Procedure: ESOPHAGOGASTRODUODENOSCOPY (EGD);  Surgeon: Vick Florentino MD;  Location:  GI     ESOPHAGOSCOPY, GASTROSCOPY, DUODENOSCOPY (EGD), COMBINED N/A 4/11/2022    Procedure: ESOPHAGOGASTRODUODENOSCOPY (EGD);  Surgeon: Vick Florentino MD;  Location:  GI     ESOPHAGOSCOPY, GASTROSCOPY, DUODENOSCOPY (EGD), COMBINED N/A 8/26/2022    Procedure: ESOPHAGOGASTRODUODENOSCOPY (EGD);  Surgeon: El Mcgovern MD;  Location:  GI     HERNIA REPAIR  2006     HERNIORRHAPHY VENTRAL   2013    Procedure: HERNIORRHAPHY VENTRAL;  VENTRAL HERNIA REPAIR WITH MESH;  Surgeon: Patel Guzman MD;  Location:  OR     KNEE SURGERY      arthroscopic right knee surgery      LOBECTOMY LUNG Right 3/26/2019    POSSIBLE LOBECTOMY LUNG per Dr. Vasques at [facility]     REPAIR LIGAMENT ANKLE  2012    Procedure:REPAIR LIGAMENT ANKLE; LEFT TARSAL TUNNEL RELEASE OF KNOT OF ARIEL RELEASE; Surgeon:SAUL PUENTE; Location: OR     REPLACE VALVE AORTIC N/A 9/3/2015    Procedure: REPLACE VALVE AORTIC;  Surgeon: Antonino Mitchell MD;  Location:  OR     ROTATOR CUFF REPAIR RT/LT      bilateral     SPINE SURGERY      3 spine surgeries     THORACOTOMY, WEDGE RESECTION LUNG, COMBINED Right 3/26/2019    RIGHT THORACOTOMY, WEDGE RESECTION, RIGHT LOWER LOBE LUNG NODULE,;  Surgeon: Jose A Vasques MD;  Location:  OR     TONSILLECTOMY       TURP         FAMILY HISTORY:  Family History   Problem Relation Age of Onset     C.A.D. Father      Emphysema Father      Melanoma No family hx of      Skin Cancer No family hx of        SOCIAL HISTORY:  Social History     Socioeconomic History     Marital status:      Spouse name: None     Number of children: None     Years of education: None     Highest education level: None   Tobacco Use     Smoking status: Former     Packs/day: 2.00     Years: 55.00     Pack years: 110.00     Types: Cigarettes     Quit date: 1998     Years since quittin.8     Smokeless tobacco: Never   Vaping Use     Vaping Use: Never used   Substance and Sexual Activity     Alcohol use: Not Currently     Drug use: No     Sexual activity: Not Currently     Partners: Female   Other Topics Concern     Caffeine Concern No     Comment: occ     Special Diet No     Exercise No     Seat Belt Yes   Social History Narrative    , [place]    Retired  - self employed       Review of Systems:  Skin:          Eyes:         ENT:        "  Respiratory:  Positive for dyspnea on exertion;shortness of breath     Cardiovascular:  Negative;palpitations;chest pain;syncope or near-syncope;cyanosis exercise intolerance;Positive for    Gastroenterology:        Genitourinary:         Musculoskeletal:         Neurologic:         Psychiatric:         Heme/Lymph/Imm:         Endocrine:           Physical Exam:  Vitals: /60   Pulse 86   Ht 1.727 m (5' 8\")   Wt 72.1 kg (158 lb 14.4 oz)   SpO2 90%   BMI 24.16 kg/m      General patient appears comfortable  Neck normal JV  Cardiovascular system grade 2 x 6 systolic murmur over the right upper sternal border, no S3-S4 rub or gallop normal rate  Respite system decreased air entry bilaterally  Extremities no pitting pedal edema  Neurological alert, oriented  HEENT pallor noted    CC  No referring provider defined for this encounter.    Thank you for allowing me to participate in the care of your patient.      Sincerely,     Tab Grijalva MD     [facility]  "

## 2022-12-15 ENCOUNTER — TRANSFERRED RECORDS (OUTPATIENT)
Dept: HEALTH INFORMATION MANAGEMENT | Facility: CLINIC | Age: 87
End: 2022-12-15

## 2022-12-26 ENCOUNTER — MEDICAL CORRESPONDENCE (OUTPATIENT)
Dept: HEALTH INFORMATION MANAGEMENT | Facility: CLINIC | Age: 87
End: 2022-12-26

## 2022-12-28 ENCOUNTER — TELEPHONE (OUTPATIENT)
Dept: FAMILY MEDICINE | Facility: CLINIC | Age: 87
End: 2022-12-28

## 2022-12-28 ENCOUNTER — OFFICE VISIT (OUTPATIENT)
Dept: FAMILY MEDICINE | Facility: CLINIC | Age: 87
End: 2022-12-28
Payer: COMMERCIAL

## 2022-12-28 VITALS
TEMPERATURE: 97.8 F | HEART RATE: 84 BPM | WEIGHT: 149.2 LBS | DIASTOLIC BLOOD PRESSURE: 55 MMHG | BODY MASS INDEX: 22.69 KG/M2 | RESPIRATION RATE: 20 BRPM | SYSTOLIC BLOOD PRESSURE: 105 MMHG | OXYGEN SATURATION: 91 %

## 2022-12-28 DIAGNOSIS — J18.9 PNEUMONIA OF LOWER LOBE DUE TO INFECTIOUS ORGANISM, UNSPECIFIED LATERALITY: ICD-10-CM

## 2022-12-28 DIAGNOSIS — M70.62 TROCHANTERIC BURSITIS OF LEFT HIP: Primary | ICD-10-CM

## 2022-12-28 PROCEDURE — 20610 DRAIN/INJ JOINT/BURSA W/O US: CPT | Mod: LT | Performed by: INTERNAL MEDICINE

## 2022-12-28 PROCEDURE — 99213 OFFICE O/P EST LOW 20 MIN: CPT | Mod: 25 | Performed by: INTERNAL MEDICINE

## 2022-12-28 RX ORDER — LIDOCAINE HYDROCHLORIDE 10 MG/ML
3 INJECTION, SOLUTION INFILTRATION; PERINEURAL ONCE
Status: COMPLETED | OUTPATIENT
Start: 2022-12-28 | End: 2022-12-28

## 2022-12-28 RX ORDER — TRIAMCINOLONE ACETONIDE 40 MG/ML
40 INJECTION, SUSPENSION INTRA-ARTICULAR; INTRAMUSCULAR ONCE
Status: COMPLETED | OUTPATIENT
Start: 2022-12-28 | End: 2022-12-28

## 2022-12-28 RX ADMIN — LIDOCAINE HYDROCHLORIDE 3 ML: 10 INJECTION, SOLUTION INFILTRATION; PERINEURAL at 15:18

## 2022-12-28 RX ADMIN — TRIAMCINOLONE ACETONIDE 40 MG: 40 INJECTION, SUSPENSION INTRA-ARTICULAR; INTRAMUSCULAR at 15:19

## 2022-12-28 ASSESSMENT — PAIN SCALES - GENERAL: PAINLEVEL: NO PAIN (0)

## 2022-12-28 NOTE — TELEPHONE ENCOUNTER
Order/Referral Request    Who is requesting: MAGDY Galicia calling in orders    Orders being requested: Requesting to back date orders 12/13/2022, pt declines speech therapy    Reason service is needed/diagnosis: unknown    When are orders needed by: asap    Has this been discussed with Provider: Phuong CURIEL    [facility]

## 2022-12-28 NOTE — PROGRESS NOTES
Assessment & Plan     Trochanteric bursitis of left hip    Left hip bursa inection  After discussion of risks and benefits, informed consent was obtained.  Area prepped and draped in sterile fashion.  Local anesthesia was obtained with 1% lidocaine.   1cc of K40 was injected into the bursal space.  The procedure was tolerate well and after care was discussed.     - triamcinolone (KENALOG-40) injection 40 mg  - lidocaine 1 % injection 3 mL  - Large Joint/Bursa injection and/or drainage (Shoulder, Knee)    Symptoms improved after injection     Pneumonia of lower lobe due to infectious organism, unspecified laterality  Improving clinically         27 minutes spent on the date of the encounter doing chart review, history and exam, documentation and further activities per the note           Return in about 2 weeks (around 1/11/2023) for recheck. and he requests contralateral injection then too     Karson Bishop MD  [facility] RENETTA Akhtar is a 89 year old, presenting for the following health issues:  Follow Up (Follow up for pneumonia.)      History of Present Illness       Reason for visit:  Follow up for pneumonia.    He eats 0-1 servings of fruits and vegetables daily.He consumes 1 sweetened beverage(s) daily.He exercises with enough effort to increase his heart rate 9 or less minutes per day.  He exercises with enough effort to increase his heart rate 3 or less days per week.   He is taking medications regularly.     He is doing better  He does not have his oxygen with him today, but he tells me he usually uses it  He requests a left hip bursal injection  He has been having lateral left hip pain for several weeks refractory to topical analgesics and APAP   In the past similar pain flare ups have responded to bursal injections     Review of Systems         Objective    /55 (BP Location: Right arm, Patient Position: Sitting, Cuff Size: Adult Regular)   Pulse 84   Temp 97.8  F  (36.6  C) (Temporal)   Resp 20   Wt 67.7 kg (149 lb 3.2 oz)   SpO2 91%   BMI 22.69 kg/m    Body mass index is 22.69 kg/m .  Physical Exam   GEN:  He looks improved  MSK:  Left him joint with full pain-free ROM; bursa is tender

## 2022-12-29 ENCOUNTER — VIRTUAL VISIT (OUTPATIENT)
Dept: PHARMACY | Facility: CLINIC | Age: 87
End: 2022-12-29
Payer: COMMERCIAL

## 2022-12-29 DIAGNOSIS — K21.9 GASTROESOPHAGEAL REFLUX DISEASE WITHOUT ESOPHAGITIS: ICD-10-CM

## 2022-12-29 DIAGNOSIS — I50.33 ACUTE ON CHRONIC DIASTOLIC CONGESTIVE HEART FAILURE (H): ICD-10-CM

## 2022-12-29 DIAGNOSIS — R52 PAIN: Primary | ICD-10-CM

## 2022-12-29 DIAGNOSIS — R39.9 LOWER URINARY TRACT SYMPTOMS (LUTS): ICD-10-CM

## 2022-12-29 DIAGNOSIS — J44.9 CHRONIC OBSTRUCTIVE PULMONARY DISEASE, UNSPECIFIED COPD TYPE (H): ICD-10-CM

## 2022-12-29 PROCEDURE — 99606 MTMS BY PHARM EST 15 MIN: CPT | Performed by: PHARMACIST

## 2022-12-29 PROCEDURE — 99607 MTMS BY PHARM ADDL 15 MIN: CPT | Performed by: PHARMACIST

## 2022-12-29 RX ORDER — FLUTICASONE FUROATE AND VILANTEROL 200; 25 UG/1; UG/1
1 POWDER RESPIRATORY (INHALATION) DAILY
Status: ON HOLD | COMMUNITY
End: 2024-01-01

## 2022-12-29 NOTE — Clinical Note
Hello,  Patient is not sure whether he should still be taking flomax, proscar, or bumex and states he thinks someone told him to stop. Just wanted to check with you on this .  Valentin Cat, Pharm. D., [facility] Medication Therapy Management Pharmacist Direct Voicemail: [phone]

## 2022-12-29 NOTE — PROGRESS NOTES
"Medication Therapy Management (MTM) Encounter    ASSESSMENT:                            Medication Adherence/Access: No issues identified    LUTS: Unclear if these medications were supposed to be stopped or if he just ran out of these. Will check with PCP.    Pain: Stable.      COPD: Patient should rinse mouth out after using Breo.    CAD: Stable.     Heartburn: Sounds like the patient is taking multiple doses of the famotidine on accident.       PLAN:                            1. Make sure to rinse your mouth out everytime after using the Breo to prevent thrush.   2. Only take one famotidine twice daily.   3. Check with your PCP on if you should take the Flomax, Proscar, or Bumex.    Follow-up: No follow-ups on file.    SUBJECTIVE/OBJECTIVE:                          Hermilo Velasquez is a 89 year old male called for a follow-up visit.  Today's visit is a follow-up MTM visit from 10/25/22     Reason for visit: mtm follow-up.    Allergies/ADRs: Reviewed in chart  Past Medical History: Reviewed in chart  Tobacco: He reports that he quit smoking about 24 years ago. His smoking use included cigarettes. He has a 110.00 pack-year smoking history. He has never used smokeless tobacco.  Alcohol: not currently using    Medication Adherence/Access: - reports he doesn't forget to take medications but is very unfamiliar with his medications when asked what he is taking.     LUTS: Reports that every once in awhile \"it isnt flowing like it should.\" Was given 30 days worth of Flomax, Proscar, and Bumex after hospitalization in October. Not sure if he is still supposed to be taking these.     Pain: Currently taking acetaminophen 1000 mg three times a day as needed.  Reports no issues. Also takes gabapentin 200 mg at bedtime for neuropathy. Reports that his back hurts sometimes. Got a \"shot in the hip yesterday and that vic helped.\"     COPD: Current medications: Breo once daily  LAMA- Spiriva Handihaler 1 puff(s) once " daily  Short-Acting Bronchodilator: Albuterol MDI, Nebs and pt reports using 1 times per week. Reports he is using albuterol MDI 2 in the morning and two at night because he thought that is what he was supposed to do. sometimes rinses mouth out. Also uses Oxygen .    Patient is not experiencing side effects.   Patient reports the following symptoms: none.     CAD: Currently taking atorvastatin 10 mg daily, bumex 0.5 mg daily(not taking), and metoprolol 25 mg twice a day (not taking). EF: 50-55% Has blood pressure taken, reports last time was 130 something over 60 something.      BP Readings from Last 3 Encounters:   12/28/22 105/55   12/02/22 121/60   11/23/22 111/50       Pulse Readings from Last 3 Encounters:   12/28/22 84   12/02/22 86   11/23/22 76         Heartburn: Currently taking famotidine 40 mg twice a day. Reports he has two different bottles of famotidine and he was taking both of them.       Today's Vitals: There were no vitals taken for this visit.  ----------------      I spent 30 minutes with this patient today. All changes were made via collaborative practice agreement with Karson Bishop MD. A copy of the visit note was provided to the patient's provider(s).    A summary of these recommendations was sent via [email or web address].    Valentin Cat, Pharm. D., BCACP  Medication Therapy Management Pharmacist  Direct Voicemail: [phone]      Telemedicine Visit Details  Type of service:  Telephone visit  Start Time: 11 am  End Time: 1130 am  Originating Location (pt. Location): Home      Distant Location (provider location):  Off-site  Provider has received verbal consent for a visit from the patient? Yes     Medication Therapy Recommendations  Chronic obstructive pulmonary disease, unspecified COPD type (H)    Current Medication: fluticasone-vilanterol (BREO ELLIPTA) 200-25 MCG/ACT inhaler   Rationale: Does not understand instructions - Adherence - Adherence   Recommendation: Provide Education   Status: Patient  Agreed - Adherence/Education         GERD (gastroesophageal reflux disease)    Current Medication: famotidine (PEPCID) 40 MG tablet   Rationale: Does not understand instructions - Adherence - Adherence   Recommendation: Provide Education   Status: Patient Agreed - Adherence/Education

## 2022-12-30 ENCOUNTER — TELEPHONE (OUTPATIENT)
Dept: FAMILY MEDICINE | Facility: CLINIC | Age: 87
End: 2022-12-30

## 2022-12-30 NOTE — PATIENT INSTRUCTIONS
"Recommendations from today's MTM visit:                                                       1. Make sure to rinse your mouth out everytime after using the Breo to prevent thrush.   2. Only take one famotidine twice daily.   3. Check with your PCP on if you should take the Flomax, Proscar, or Bumex.    Follow-up: Return in about 1 month (around 1/31/2023) for Follow up, with me.    It was great speaking with you today.  I value your experience and would be very thankful for your time in providing feedback in our clinic survey. In the next few days, you may receive an email or text message from [facility] [employer] with a link to a survey related to your  clinical pharmacist.\"     To schedule another MTM appointment, please call the clinic directly or you may call the MTM scheduling line at [phone] or toll-free at [phone].     My Clinical Pharmacist's contact information:                                                      Please feel free to contact me with any questions or concerns you have.      Valentin Cat, Pharm. D., BCACP  Medication Therapy Management Pharmacist  Direct Voicemail: [phone]     "

## 2022-12-30 NOTE — TELEPHONE ENCOUNTER
Contact - Lindsay ([facility])    Attempt # 1    Was call answered?  No.  Left message on voicemail with information to call triage back.    On callback - please clarify what orders are needed by homecare     Luis Mccabe RN  [facility]

## 2022-12-30 NOTE — TELEPHONE ENCOUNTER
Joe, Speech Therapist, reports that the patient refused speech therapist. Verbal order given to discontinue speech therapy.Joe agrees to fax to Dr. Edna harris. Didi Lam RN

## 2023-01-01 ENCOUNTER — THERAPY VISIT (OUTPATIENT)
Dept: PHYSICAL THERAPY | Facility: CLINIC | Age: 88
End: 2023-01-01
Payer: COMMERCIAL

## 2023-01-01 ENCOUNTER — TELEPHONE (OUTPATIENT)
Dept: FAMILY MEDICINE | Facility: CLINIC | Age: 88
End: 2023-01-01
Payer: COMMERCIAL

## 2023-01-01 ENCOUNTER — PATIENT OUTREACH (OUTPATIENT)
Dept: CARE COORDINATION | Facility: CLINIC | Age: 88
End: 2023-01-01
Payer: COMMERCIAL

## 2023-01-01 ENCOUNTER — NURSE TRIAGE (OUTPATIENT)
Dept: NURSING | Facility: CLINIC | Age: 88
End: 2023-01-01
Payer: COMMERCIAL

## 2023-01-01 ENCOUNTER — OFFICE VISIT (OUTPATIENT)
Dept: FAMILY MEDICINE | Facility: CLINIC | Age: 88
End: 2023-01-01
Payer: COMMERCIAL

## 2023-01-01 ENCOUNTER — APPOINTMENT (OUTPATIENT)
Dept: PHYSICAL THERAPY | Facility: CLINIC | Age: 88
DRG: 871 | End: 2023-01-01
Payer: COMMERCIAL

## 2023-01-01 ENCOUNTER — HOSPITAL ENCOUNTER (INPATIENT)
Facility: CLINIC | Age: 88
LOS: 4 days | Discharge: HOME HEALTH | DRG: 872 | End: 2023-09-16
Attending: SOCIAL WORKER | Admitting: INTERNAL MEDICINE
Payer: COMMERCIAL

## 2023-01-01 ENCOUNTER — TRANSFERRED RECORDS (OUTPATIENT)
Dept: HEALTH INFORMATION MANAGEMENT | Facility: CLINIC | Age: 88
End: 2023-01-01
Payer: COMMERCIAL

## 2023-01-01 ENCOUNTER — ANCILLARY PROCEDURE (OUTPATIENT)
Dept: MRI IMAGING | Facility: CLINIC | Age: 88
End: 2023-01-01
Attending: INTERNAL MEDICINE
Payer: COMMERCIAL

## 2023-01-01 ENCOUNTER — APPOINTMENT (OUTPATIENT)
Dept: PHYSICAL THERAPY | Facility: CLINIC | Age: 88
DRG: 871 | End: 2023-01-01
Attending: INTERNAL MEDICINE
Payer: COMMERCIAL

## 2023-01-01 ENCOUNTER — APPOINTMENT (OUTPATIENT)
Dept: OCCUPATIONAL THERAPY | Facility: CLINIC | Age: 88
DRG: 871 | End: 2023-01-01
Payer: COMMERCIAL

## 2023-01-01 ENCOUNTER — APPOINTMENT (OUTPATIENT)
Dept: OCCUPATIONAL THERAPY | Facility: CLINIC | Age: 88
DRG: 871 | End: 2023-01-01
Attending: INTERNAL MEDICINE
Payer: COMMERCIAL

## 2023-01-01 ENCOUNTER — TELEPHONE (OUTPATIENT)
Dept: PHARMACY | Facility: CLINIC | Age: 88
End: 2023-01-01
Payer: COMMERCIAL

## 2023-01-01 ENCOUNTER — TELEPHONE (OUTPATIENT)
Dept: GASTROENTEROLOGY | Facility: CLINIC | Age: 88
End: 2023-01-01
Payer: COMMERCIAL

## 2023-01-01 ENCOUNTER — APPOINTMENT (OUTPATIENT)
Dept: CT IMAGING | Facility: CLINIC | Age: 88
DRG: 371 | End: 2023-01-01
Attending: EMERGENCY MEDICINE
Payer: COMMERCIAL

## 2023-01-01 ENCOUNTER — APPOINTMENT (OUTPATIENT)
Dept: PHYSICAL THERAPY | Facility: CLINIC | Age: 88
DRG: 872 | End: 2023-01-01
Attending: INTERNAL MEDICINE
Payer: COMMERCIAL

## 2023-01-01 ENCOUNTER — APPOINTMENT (OUTPATIENT)
Dept: OCCUPATIONAL THERAPY | Facility: CLINIC | Age: 88
DRG: 872 | End: 2023-01-01
Payer: COMMERCIAL

## 2023-01-01 ENCOUNTER — TELEPHONE (OUTPATIENT)
Dept: CARDIOLOGY | Facility: CLINIC | Age: 88
End: 2023-01-01

## 2023-01-01 ENCOUNTER — HEALTH MAINTENANCE LETTER (OUTPATIENT)
Age: 88
End: 2023-01-01

## 2023-01-01 ENCOUNTER — APPOINTMENT (OUTPATIENT)
Dept: GENERAL RADIOLOGY | Facility: CLINIC | Age: 88
DRG: 371 | End: 2023-01-01
Attending: EMERGENCY MEDICINE
Payer: COMMERCIAL

## 2023-01-01 ENCOUNTER — APPOINTMENT (OUTPATIENT)
Dept: PHYSICAL THERAPY | Facility: CLINIC | Age: 88
DRG: 371 | End: 2023-01-01
Attending: INTERNAL MEDICINE
Payer: COMMERCIAL

## 2023-01-01 ENCOUNTER — NURSE TRIAGE (OUTPATIENT)
Dept: FAMILY MEDICINE | Facility: CLINIC | Age: 88
End: 2023-01-01
Payer: COMMERCIAL

## 2023-01-01 ENCOUNTER — HOSPITAL ENCOUNTER (INPATIENT)
Facility: CLINIC | Age: 88
LOS: 2 days | Discharge: HOME HEALTH | DRG: 371 | End: 2023-08-19
Attending: EMERGENCY MEDICINE | Admitting: INTERNAL MEDICINE
Payer: COMMERCIAL

## 2023-01-01 ENCOUNTER — APPOINTMENT (OUTPATIENT)
Dept: PHYSICAL THERAPY | Facility: CLINIC | Age: 88
DRG: 872 | End: 2023-01-01
Payer: COMMERCIAL

## 2023-01-01 ENCOUNTER — APPOINTMENT (OUTPATIENT)
Dept: LAB | Facility: CLINIC | Age: 88
End: 2023-01-01
Payer: COMMERCIAL

## 2023-01-01 ENCOUNTER — HOSPITAL ENCOUNTER (INPATIENT)
Facility: CLINIC | Age: 88
LOS: 5 days | Discharge: HOME | DRG: 871 | End: 2023-12-01
Attending: EMERGENCY MEDICINE | Admitting: INTERNAL MEDICINE
Payer: COMMERCIAL

## 2023-01-01 ENCOUNTER — APPOINTMENT (OUTPATIENT)
Dept: CARDIOLOGY | Facility: CLINIC | Age: 88
DRG: 872 | End: 2023-01-01
Attending: INTERNAL MEDICINE
Payer: COMMERCIAL

## 2023-01-01 ENCOUNTER — APPOINTMENT (OUTPATIENT)
Dept: CT IMAGING | Facility: CLINIC | Age: 88
DRG: 872 | End: 2023-01-01
Attending: SOCIAL WORKER
Payer: COMMERCIAL

## 2023-01-01 ENCOUNTER — APPOINTMENT (OUTPATIENT)
Dept: CT IMAGING | Facility: CLINIC | Age: 88
DRG: 871 | End: 2023-01-01
Attending: EMERGENCY MEDICINE
Payer: COMMERCIAL

## 2023-01-01 ENCOUNTER — ANCILLARY PROCEDURE (OUTPATIENT)
Dept: CT IMAGING | Facility: CLINIC | Age: 88
DRG: 872 | End: 2023-01-01
Attending: INTERNAL MEDICINE
Payer: COMMERCIAL

## 2023-01-01 ENCOUNTER — VIRTUAL VISIT (OUTPATIENT)
Dept: PHARMACY | Facility: CLINIC | Age: 88
End: 2023-01-01
Payer: COMMERCIAL

## 2023-01-01 ENCOUNTER — APPOINTMENT (OUTPATIENT)
Dept: OCCUPATIONAL THERAPY | Facility: CLINIC | Age: 88
DRG: 872 | End: 2023-01-01
Attending: INTERNAL MEDICINE
Payer: COMMERCIAL

## 2023-01-01 ENCOUNTER — TELEPHONE (OUTPATIENT)
Dept: FAMILY MEDICINE | Facility: CLINIC | Age: 88
End: 2023-01-01

## 2023-01-01 ENCOUNTER — APPOINTMENT (OUTPATIENT)
Dept: GENERAL RADIOLOGY | Facility: CLINIC | Age: 88
DRG: 872 | End: 2023-01-01
Attending: SOCIAL WORKER
Payer: COMMERCIAL

## 2023-01-01 ENCOUNTER — OFFICE VISIT (OUTPATIENT)
Dept: PEDIATRICS | Facility: CLINIC | Age: 88
End: 2023-01-01
Payer: COMMERCIAL

## 2023-01-01 ENCOUNTER — APPOINTMENT (OUTPATIENT)
Dept: GENERAL RADIOLOGY | Facility: CLINIC | Age: 88
DRG: 871 | End: 2023-01-01
Attending: EMERGENCY MEDICINE
Payer: COMMERCIAL

## 2023-01-01 ENCOUNTER — OFFICE VISIT (OUTPATIENT)
Dept: URGENT CARE | Facility: URGENT CARE | Age: 88
End: 2023-01-01
Payer: COMMERCIAL

## 2023-01-01 ENCOUNTER — ANCILLARY PROCEDURE (OUTPATIENT)
Dept: CT IMAGING | Facility: CLINIC | Age: 88
End: 2023-01-01
Attending: INTERNAL MEDICINE
Payer: COMMERCIAL

## 2023-01-01 ENCOUNTER — NURSE TRIAGE (OUTPATIENT)
Dept: FAMILY MEDICINE | Facility: CLINIC | Age: 88
End: 2023-01-01

## 2023-01-01 ENCOUNTER — THERAPY VISIT (OUTPATIENT)
Dept: PHYSICAL THERAPY | Facility: CLINIC | Age: 88
End: 2023-01-01
Attending: INTERNAL MEDICINE
Payer: COMMERCIAL

## 2023-01-01 ENCOUNTER — TELEPHONE (OUTPATIENT)
Dept: NURSING | Facility: CLINIC | Age: 88
End: 2023-01-01

## 2023-01-01 VITALS
DIASTOLIC BLOOD PRESSURE: 63 MMHG | BODY MASS INDEX: 21.06 KG/M2 | HEIGHT: 70 IN | OXYGEN SATURATION: 90 % | WEIGHT: 147.1 LBS | RESPIRATION RATE: 16 BRPM | TEMPERATURE: 97.3 F | SYSTOLIC BLOOD PRESSURE: 105 MMHG | HEART RATE: 89 BPM

## 2023-01-01 VITALS
WEIGHT: 152 LBS | DIASTOLIC BLOOD PRESSURE: 63 MMHG | RESPIRATION RATE: 20 BRPM | BODY MASS INDEX: 23.11 KG/M2 | HEART RATE: 78 BPM | TEMPERATURE: 97.7 F | OXYGEN SATURATION: 96 % | SYSTOLIC BLOOD PRESSURE: 126 MMHG

## 2023-01-01 VITALS
HEART RATE: 85 BPM | BODY MASS INDEX: 22.58 KG/M2 | DIASTOLIC BLOOD PRESSURE: 57 MMHG | WEIGHT: 148.5 LBS | SYSTOLIC BLOOD PRESSURE: 100 MMHG | RESPIRATION RATE: 20 BRPM | OXYGEN SATURATION: 94 % | TEMPERATURE: 97.5 F

## 2023-01-01 VITALS
RESPIRATION RATE: 18 BRPM | SYSTOLIC BLOOD PRESSURE: 132 MMHG | DIASTOLIC BLOOD PRESSURE: 66 MMHG | HEIGHT: 70 IN | WEIGHT: 148.59 LBS | OXYGEN SATURATION: 95 % | TEMPERATURE: 97.9 F | BODY MASS INDEX: 21.27 KG/M2 | HEART RATE: 65 BPM

## 2023-01-01 VITALS
WEIGHT: 154.3 LBS | RESPIRATION RATE: 16 BRPM | HEART RATE: 75 BPM | BODY MASS INDEX: 23.39 KG/M2 | TEMPERATURE: 97.8 F | DIASTOLIC BLOOD PRESSURE: 62 MMHG | SYSTOLIC BLOOD PRESSURE: 120 MMHG | OXYGEN SATURATION: 97 % | HEIGHT: 68 IN

## 2023-01-01 VITALS
HEART RATE: 64 BPM | OXYGEN SATURATION: 81 % | SYSTOLIC BLOOD PRESSURE: 117 MMHG | DIASTOLIC BLOOD PRESSURE: 59 MMHG | HEIGHT: 70 IN | TEMPERATURE: 97 F | WEIGHT: 150.2 LBS | BODY MASS INDEX: 21.5 KG/M2

## 2023-01-01 VITALS
HEIGHT: 70 IN | DIASTOLIC BLOOD PRESSURE: 41 MMHG | HEART RATE: 80 BPM | WEIGHT: 149.1 LBS | BODY MASS INDEX: 21.35 KG/M2 | RESPIRATION RATE: 16 BRPM | OXYGEN SATURATION: 91 % | SYSTOLIC BLOOD PRESSURE: 77 MMHG | TEMPERATURE: 97.3 F

## 2023-01-01 VITALS
HEIGHT: 70 IN | SYSTOLIC BLOOD PRESSURE: 128 MMHG | BODY MASS INDEX: 20.04 KG/M2 | TEMPERATURE: 97 F | DIASTOLIC BLOOD PRESSURE: 74 MMHG | OXYGEN SATURATION: 92 % | HEART RATE: 89 BPM | RESPIRATION RATE: 18 BRPM | WEIGHT: 140 LBS

## 2023-01-01 VITALS
TEMPERATURE: 99 F | OXYGEN SATURATION: 95 % | DIASTOLIC BLOOD PRESSURE: 62 MMHG | HEART RATE: 82 BPM | SYSTOLIC BLOOD PRESSURE: 111 MMHG

## 2023-01-01 VITALS
BODY MASS INDEX: 22.6 KG/M2 | OXYGEN SATURATION: 92 % | SYSTOLIC BLOOD PRESSURE: 123 MMHG | RESPIRATION RATE: 18 BRPM | HEIGHT: 70 IN | WEIGHT: 157.85 LBS | TEMPERATURE: 97.7 F | DIASTOLIC BLOOD PRESSURE: 74 MMHG | HEART RATE: 77 BPM

## 2023-01-01 VITALS
WEIGHT: 151.9 LBS | HEART RATE: 73 BPM | BODY MASS INDEX: 21.75 KG/M2 | HEIGHT: 70 IN | OXYGEN SATURATION: 92 % | RESPIRATION RATE: 16 BRPM | SYSTOLIC BLOOD PRESSURE: 92 MMHG | DIASTOLIC BLOOD PRESSURE: 46 MMHG | TEMPERATURE: 97.9 F

## 2023-01-01 VITALS
BODY MASS INDEX: 21.89 KG/M2 | HEART RATE: 65 BPM | HEIGHT: 70 IN | RESPIRATION RATE: 10 BRPM | OXYGEN SATURATION: 92 % | SYSTOLIC BLOOD PRESSURE: 124 MMHG | DIASTOLIC BLOOD PRESSURE: 57 MMHG | TEMPERATURE: 98.1 F | WEIGHT: 152.9 LBS

## 2023-01-01 VITALS
DIASTOLIC BLOOD PRESSURE: 65 MMHG | HEART RATE: 77 BPM | SYSTOLIC BLOOD PRESSURE: 149 MMHG | WEIGHT: 140.65 LBS | RESPIRATION RATE: 16 BRPM | TEMPERATURE: 97.5 F | OXYGEN SATURATION: 97 % | BODY MASS INDEX: 20.18 KG/M2

## 2023-01-01 DIAGNOSIS — R19.7 VOMITING AND DIARRHEA: Primary | ICD-10-CM

## 2023-01-01 DIAGNOSIS — D64.9 ANEMIA, UNSPECIFIED TYPE: ICD-10-CM

## 2023-01-01 DIAGNOSIS — I10 ESSENTIAL HYPERTENSION WITH GOAL BLOOD PRESSURE LESS THAN 140/90: ICD-10-CM

## 2023-01-01 DIAGNOSIS — R42 DIZZINESS: ICD-10-CM

## 2023-01-01 DIAGNOSIS — Z09 HOSPITAL DISCHARGE FOLLOW-UP: Primary | ICD-10-CM

## 2023-01-01 DIAGNOSIS — R42 DIZZINESS: Primary | ICD-10-CM

## 2023-01-01 DIAGNOSIS — R42 VERTIGO: ICD-10-CM

## 2023-01-01 DIAGNOSIS — C34.90 MALIGNANT NEOPLASM OF LUNG, UNSPECIFIED LATERALITY, UNSPECIFIED PART OF LUNG (H): Primary | ICD-10-CM

## 2023-01-01 DIAGNOSIS — I95.1 ORTHOSTATIC HYPOTENSION: ICD-10-CM

## 2023-01-01 DIAGNOSIS — J96.11 CHRONIC RESPIRATORY FAILURE WITH HYPOXIA (H): ICD-10-CM

## 2023-01-01 DIAGNOSIS — I26.99 OTHER PULMONARY EMBOLISM WITHOUT ACUTE COR PULMONALE, UNSPECIFIED CHRONICITY (H): ICD-10-CM

## 2023-01-01 DIAGNOSIS — R42 VERTIGO: Primary | ICD-10-CM

## 2023-01-01 DIAGNOSIS — R11.10 VOMITING AND DIARRHEA: Primary | ICD-10-CM

## 2023-01-01 DIAGNOSIS — H81.12 BENIGN PAROXYSMAL POSITIONAL VERTIGO, LEFT: Primary | ICD-10-CM

## 2023-01-01 DIAGNOSIS — R91.8 PULMONARY NODULES: ICD-10-CM

## 2023-01-01 DIAGNOSIS — R52 PAIN: ICD-10-CM

## 2023-01-01 DIAGNOSIS — G62.9 PERIPHERAL POLYNEUROPATHY: ICD-10-CM

## 2023-01-01 DIAGNOSIS — Z23 HIGH PRIORITY FOR 2019-NCOV VACCINE: ICD-10-CM

## 2023-01-01 DIAGNOSIS — R52 PAIN: Primary | ICD-10-CM

## 2023-01-01 DIAGNOSIS — H81.12 BENIGN PAROXYSMAL POSITIONAL VERTIGO, LEFT: ICD-10-CM

## 2023-01-01 DIAGNOSIS — E86.0 DEHYDRATION: ICD-10-CM

## 2023-01-01 DIAGNOSIS — N28.9 RENAL INSUFFICIENCY: ICD-10-CM

## 2023-01-01 DIAGNOSIS — Z95.4 HISTORY OF AORTIC VALVE REPLACEMENT WITH TISSUE GRAFT: ICD-10-CM

## 2023-01-01 DIAGNOSIS — A04.72 C. DIFFICILE COLITIS: Primary | ICD-10-CM

## 2023-01-01 DIAGNOSIS — I71.40 ABDOMINAL AORTIC ANEURYSM (AAA) WITHOUT RUPTURE, UNSPECIFIED PART (H): Primary | ICD-10-CM

## 2023-01-01 DIAGNOSIS — K21.9 GASTROESOPHAGEAL REFLUX DISEASE WITHOUT ESOPHAGITIS: ICD-10-CM

## 2023-01-01 DIAGNOSIS — R41.82 ALTERED MENTAL STATUS, UNSPECIFIED ALTERED MENTAL STATUS TYPE: Primary | ICD-10-CM

## 2023-01-01 DIAGNOSIS — R19.5 LOOSE STOOLS: ICD-10-CM

## 2023-01-01 DIAGNOSIS — R53.1 GENERALIZED WEAKNESS: ICD-10-CM

## 2023-01-01 DIAGNOSIS — J44.9 CHRONIC OBSTRUCTIVE PULMONARY DISEASE, UNSPECIFIED COPD TYPE (H): ICD-10-CM

## 2023-01-01 DIAGNOSIS — M10.9 ACUTE GOUT OF LEFT HAND, UNSPECIFIED CAUSE: ICD-10-CM

## 2023-01-01 DIAGNOSIS — A04.72 C. DIFFICILE COLITIS: ICD-10-CM

## 2023-01-01 DIAGNOSIS — M25.512 ACUTE PAIN OF LEFT SHOULDER: ICD-10-CM

## 2023-01-01 DIAGNOSIS — L03.113 CELLULITIS OF RIGHT FOREARM: ICD-10-CM

## 2023-01-01 DIAGNOSIS — R39.9 LOWER URINARY TRACT SYMPTOMS (LUTS): ICD-10-CM

## 2023-01-01 DIAGNOSIS — R41.82 ALTERED MENTAL STATUS, UNSPECIFIED ALTERED MENTAL STATUS TYPE: ICD-10-CM

## 2023-01-01 DIAGNOSIS — A04.72 C. DIFFICILE DIARRHEA: ICD-10-CM

## 2023-01-01 DIAGNOSIS — J18.9 PNEUMONIA DUE TO INFECTIOUS ORGANISM, UNSPECIFIED LATERALITY, UNSPECIFIED PART OF LUNG: ICD-10-CM

## 2023-01-01 DIAGNOSIS — N40.0 BENIGN PROSTATIC HYPERPLASIA, UNSPECIFIED WHETHER LOWER URINARY TRACT SYMPTOMS PRESENT: ICD-10-CM

## 2023-01-01 DIAGNOSIS — K14.8 MASS OF TONGUE: ICD-10-CM

## 2023-01-01 DIAGNOSIS — K52.9 COLITIS: ICD-10-CM

## 2023-01-01 DIAGNOSIS — R50.9 FEVER, UNSPECIFIED FEVER CAUSE: ICD-10-CM

## 2023-01-01 DIAGNOSIS — E78.5 HYPERLIPIDEMIA LDL GOAL <100: Primary | ICD-10-CM

## 2023-01-01 DIAGNOSIS — I71.40 ABDOMINAL AORTIC ANEURYSM (AAA) WITHOUT RUPTURE, UNSPECIFIED PART (H): ICD-10-CM

## 2023-01-01 DIAGNOSIS — I48.0 PAROXYSMAL ATRIAL FIBRILLATION (H): ICD-10-CM

## 2023-01-01 DIAGNOSIS — E87.6 HYPOKALEMIA: ICD-10-CM

## 2023-01-01 DIAGNOSIS — H90.3 BILATERAL SENSORINEURAL HEARING LOSS: ICD-10-CM

## 2023-01-01 DIAGNOSIS — R10.13 ABDOMINAL PAIN, EPIGASTRIC: Primary | ICD-10-CM

## 2023-01-01 DIAGNOSIS — I48.91 ATRIAL FIBRILLATION WITH RAPID VENTRICULAR RESPONSE (H): ICD-10-CM

## 2023-01-01 DIAGNOSIS — D72.829 LEUKOCYTOSIS, UNSPECIFIED TYPE: ICD-10-CM

## 2023-01-01 DIAGNOSIS — I95.1 ORTHOSTATIC HYPOTENSION: Primary | ICD-10-CM

## 2023-01-01 DIAGNOSIS — Z78.9 TAKES DIETARY SUPPLEMENTS: ICD-10-CM

## 2023-01-01 DIAGNOSIS — I26.94 MULTIPLE SUBSEGMENTAL PULMONARY EMBOLI WITHOUT ACUTE COR PULMONALE (H): ICD-10-CM

## 2023-01-01 DIAGNOSIS — M79.89 SWELLING OF RIGHT HAND: Primary | ICD-10-CM

## 2023-01-01 DIAGNOSIS — R19.7 DIARRHEA, UNSPECIFIED TYPE: ICD-10-CM

## 2023-01-01 DIAGNOSIS — C88.00: ICD-10-CM

## 2023-01-01 DIAGNOSIS — L03.113 CELLULITIS OF RIGHT HAND: ICD-10-CM

## 2023-01-01 DIAGNOSIS — Z23 NEED FOR PROPHYLACTIC VACCINATION AND INOCULATION AGAINST INFLUENZA: ICD-10-CM

## 2023-01-01 DIAGNOSIS — J44.9 CHRONIC OBSTRUCTIVE PULMONARY DISEASE, UNSPECIFIED COPD TYPE (H): Primary | ICD-10-CM

## 2023-01-01 DIAGNOSIS — N17.9 AKI (ACUTE KIDNEY INJURY) (H): Primary | ICD-10-CM

## 2023-01-01 DIAGNOSIS — J43.9 PULMONARY EMPHYSEMA, UNSPECIFIED EMPHYSEMA TYPE (H): ICD-10-CM

## 2023-01-01 LAB
ABO/RH(D): NORMAL
ADV 40+41 DNA STL QL NAA+NON-PROBE: NEGATIVE
ADV 40+41 DNA STL QL NAA+NON-PROBE: NEGATIVE
ALBUMIN SERPL BCG-MCNC: 3 G/DL (ref 3.5–5.2)
ALBUMIN SERPL BCG-MCNC: 3.3 G/DL (ref 3.5–5.2)
ALBUMIN SERPL BCG-MCNC: 3.5 G/DL (ref 3.5–5.2)
ALBUMIN SERPL BCG-MCNC: 3.6 G/DL (ref 3.5–5.2)
ALBUMIN UR-MCNC: 20 MG/DL
ALBUMIN UR-MCNC: 50 MG/DL
ALP SERPL-CCNC: 103 U/L (ref 40–129)
ALP SERPL-CCNC: 116 U/L (ref 40–129)
ALP SERPL-CCNC: 122 U/L (ref 40–129)
ALP SERPL-CCNC: 126 U/L (ref 40–150)
ALT SERPL W P-5'-P-CCNC: 16 U/L (ref 0–70)
ALT SERPL W P-5'-P-CCNC: 7 U/L (ref 0–70)
ALT SERPL W P-5'-P-CCNC: 7 U/L (ref 0–70)
ALT SERPL W P-5'-P-CCNC: 9 U/L (ref 0–70)
ANION GAP SERPL CALCULATED.3IONS-SCNC: 10 MMOL/L (ref 7–15)
ANION GAP SERPL CALCULATED.3IONS-SCNC: 11 MMOL/L (ref 7–15)
ANION GAP SERPL CALCULATED.3IONS-SCNC: 11 MMOL/L (ref 7–15)
ANION GAP SERPL CALCULATED.3IONS-SCNC: 12 MMOL/L (ref 7–15)
ANION GAP SERPL CALCULATED.3IONS-SCNC: 12 MMOL/L (ref 7–15)
ANION GAP SERPL CALCULATED.3IONS-SCNC: 13 MMOL/L (ref 7–15)
ANION GAP SERPL CALCULATED.3IONS-SCNC: 13 MMOL/L (ref 7–15)
ANION GAP SERPL CALCULATED.3IONS-SCNC: 8 MMOL/L (ref 7–15)
ANION GAP SERPL CALCULATED.3IONS-SCNC: 8 MMOL/L (ref 7–15)
ANION GAP SERPL CALCULATED.3IONS-SCNC: 9 MMOL/L (ref 7–15)
ANTIBODY SCREEN: NEGATIVE
APPEARANCE UR: CLEAR
APPEARANCE UR: CLEAR
AST SERPL W P-5'-P-CCNC: 11 U/L (ref 0–45)
AST SERPL W P-5'-P-CCNC: 12 U/L (ref 0–45)
AST SERPL W P-5'-P-CCNC: 13 U/L (ref 0–45)
AST SERPL W P-5'-P-CCNC: 14 U/L (ref 0–45)
ASTRO TYP 1-8 RNA STL QL NAA+NON-PROBE: NEGATIVE
ASTRO TYP 1-8 RNA STL QL NAA+NON-PROBE: NEGATIVE
ATRIAL RATE - MUSE: 278 BPM
ATRIAL RATE - MUSE: 278 BPM
ATRIAL RATE - MUSE: NORMAL BPM
ATRIAL RATE - MUSE: NORMAL BPM
BACTERIA BLD CULT: NO GROWTH
BASO+EOS+MONOS # BLD AUTO: ABNORMAL 10*3/UL
BASO+EOS+MONOS NFR BLD AUTO: ABNORMAL %
BASOPHILS # BLD AUTO: 0 10E3/UL (ref 0–0.2)
BASOPHILS NFR BLD AUTO: 0 %
BILIRUB SERPL-MCNC: 0.3 MG/DL
BILIRUB SERPL-MCNC: 0.4 MG/DL
BILIRUB SERPL-MCNC: 0.6 MG/DL
BILIRUB SERPL-MCNC: 0.6 MG/DL
BILIRUB UR QL STRIP: NEGATIVE
BILIRUB UR QL STRIP: NEGATIVE
BUN SERPL-MCNC: 16.2 MG/DL (ref 8–23)
BUN SERPL-MCNC: 17.3 MG/DL (ref 8–23)
BUN SERPL-MCNC: 17.5 MG/DL (ref 8–23)
BUN SERPL-MCNC: 18.4 MG/DL (ref 8–23)
BUN SERPL-MCNC: 18.8 MG/DL (ref 8–23)
BUN SERPL-MCNC: 19 MG/DL (ref 8–23)
BUN SERPL-MCNC: 20.2 MG/DL (ref 8–23)
BUN SERPL-MCNC: 20.2 MG/DL (ref 8–23)
BUN SERPL-MCNC: 21.3 MG/DL (ref 8–23)
BUN SERPL-MCNC: 21.6 MG/DL (ref 8–23)
BUN SERPL-MCNC: 22.8 MG/DL (ref 8–23)
BUN SERPL-MCNC: 23.1 MG/DL (ref 8–23)
BUN SERPL-MCNC: 25.1 MG/DL (ref 8–23)
BUN SERPL-MCNC: 25.9 MG/DL (ref 8–23)
C CAYETANENSIS DNA STL QL NAA+NON-PROBE: NEGATIVE
C CAYETANENSIS DNA STL QL NAA+NON-PROBE: NEGATIVE
C DIFF GDH STL QL IA: NEGATIVE
C DIFF GDH STL QL IA: POSITIVE
C DIFF TOX A+B STL QL IA: NEGATIVE
C DIFF TOX A+B STL QL IA: POSITIVE
C DIFF TOX B STL QL: POSITIVE
CALCIUM SERPL-MCNC: 7.9 MG/DL (ref 8.2–9.6)
CALCIUM SERPL-MCNC: 8 MG/DL (ref 8.2–9.6)
CALCIUM SERPL-MCNC: 8.1 MG/DL (ref 8.2–9.6)
CALCIUM SERPL-MCNC: 8.1 MG/DL (ref 8.2–9.6)
CALCIUM SERPL-MCNC: 8.3 MG/DL (ref 8.2–9.6)
CALCIUM SERPL-MCNC: 8.5 MG/DL (ref 8.2–9.6)
CALCIUM SERPL-MCNC: 8.7 MG/DL (ref 8.2–9.6)
CALCIUM SERPL-MCNC: 8.8 MG/DL (ref 8.2–9.6)
CALCIUM SERPL-MCNC: 8.9 MG/DL (ref 8.2–9.6)
CALCIUM SERPL-MCNC: 9 MG/DL (ref 8.2–9.6)
CALCIUM SERPL-MCNC: 9.1 MG/DL (ref 8.2–9.6)
CALCIUM SERPL-MCNC: 9.5 MG/DL (ref 8.2–9.6)
CAMPYLOBACTER DNA SPEC NAA+PROBE: NEGATIVE
CAMPYLOBACTER DNA SPEC NAA+PROBE: NEGATIVE
CHLORIDE SERPL-SCNC: 100 MMOL/L (ref 98–107)
CHLORIDE SERPL-SCNC: 101 MMOL/L (ref 98–107)
CHLORIDE SERPL-SCNC: 103 MMOL/L (ref 98–107)
CHLORIDE SERPL-SCNC: 103 MMOL/L (ref 98–107)
CHLORIDE SERPL-SCNC: 105 MMOL/L (ref 98–107)
CHLORIDE SERPL-SCNC: 107 MMOL/L (ref 98–107)
CHLORIDE SERPL-SCNC: 108 MMOL/L (ref 98–107)
CHLORIDE SERPL-SCNC: 108 MMOL/L (ref 98–107)
CHLORIDE SERPL-SCNC: 110 MMOL/L (ref 98–107)
CHLORIDE SERPL-SCNC: 113 MMOL/L (ref 98–107)
CK SERPL-CCNC: 51 U/L (ref 39–308)
COLOR UR AUTO: YELLOW
COLOR UR AUTO: YELLOW
CREAT SERPL-MCNC: 0.94 MG/DL (ref 0.67–1.17)
CREAT SERPL-MCNC: 1.1 MG/DL (ref 0.67–1.17)
CREAT SERPL-MCNC: 1.16 MG/DL (ref 0.67–1.17)
CREAT SERPL-MCNC: 1.18 MG/DL (ref 0.67–1.17)
CREAT SERPL-MCNC: 1.19 MG/DL (ref 0.67–1.17)
CREAT SERPL-MCNC: 1.23 MG/DL (ref 0.67–1.17)
CREAT SERPL-MCNC: 1.3 MG/DL (ref 0.67–1.17)
CREAT SERPL-MCNC: 1.32 MG/DL (ref 0.67–1.17)
CREAT SERPL-MCNC: 1.36 MG/DL (ref 0.67–1.17)
CREAT SERPL-MCNC: 1.36 MG/DL (ref 0.67–1.17)
CREAT SERPL-MCNC: 1.38 MG/DL (ref 0.67–1.17)
CREAT SERPL-MCNC: 1.44 MG/DL (ref 0.67–1.17)
CREAT SERPL-MCNC: 1.59 MG/DL (ref 0.67–1.17)
CREAT SERPL-MCNC: 1.66 MG/DL (ref 0.67–1.17)
CRYPTOSP DNA STL QL NAA+NON-PROBE: NEGATIVE
CRYPTOSP DNA STL QL NAA+NON-PROBE: NEGATIVE
DEPRECATED HCO3 PLAS-SCNC: 19 MMOL/L (ref 22–29)
DEPRECATED HCO3 PLAS-SCNC: 20 MMOL/L (ref 22–29)
DEPRECATED HCO3 PLAS-SCNC: 20 MMOL/L (ref 22–29)
DEPRECATED HCO3 PLAS-SCNC: 21 MMOL/L (ref 22–29)
DEPRECATED HCO3 PLAS-SCNC: 21 MMOL/L (ref 22–29)
DEPRECATED HCO3 PLAS-SCNC: 22 MMOL/L (ref 22–29)
DEPRECATED HCO3 PLAS-SCNC: 22 MMOL/L (ref 22–29)
DEPRECATED HCO3 PLAS-SCNC: 23 MMOL/L (ref 22–29)
DEPRECATED HCO3 PLAS-SCNC: 25 MMOL/L (ref 22–29)
DEPRECATED HCO3 PLAS-SCNC: 25 MMOL/L (ref 22–29)
DEPRECATED HCO3 PLAS-SCNC: 26 MMOL/L (ref 22–29)
DEPRECATED HCO3 PLAS-SCNC: 28 MMOL/L (ref 22–29)
DIASTOLIC BLOOD PRESSURE - MUSE: NORMAL MMHG
E COLI O157 DNA STL QL NAA+NON-PROBE: NORMAL
E COLI O157 DNA STL QL NAA+NON-PROBE: NORMAL
E HISTOLYT DNA STL QL NAA+NON-PROBE: NEGATIVE
E HISTOLYT DNA STL QL NAA+NON-PROBE: NEGATIVE
EAEC ASTA GENE ISLT QL NAA+PROBE: NEGATIVE
EAEC ASTA GENE ISLT QL NAA+PROBE: NEGATIVE
EC STX1+STX2 GENES STL QL NAA+NON-PROBE: NEGATIVE
EC STX1+STX2 GENES STL QL NAA+NON-PROBE: NEGATIVE
EGFRCR SERPLBLD CKD-EPI 2021: 41 ML/MIN/1.73M2
EGFRCR SERPLBLD CKD-EPI 2021: 49 ML/MIN/1.73M2
EGFRCR SERPLBLD CKD-EPI 2021: 56 ML/MIN/1.73M2
EGFRCR SERPLBLD CKD-EPI 2021: 58 ML/MIN/1.73M2
EGFRCR SERPLBLD CKD-EPI 2021: 58 ML/MIN/1.73M2
EGFRCR SERPLBLD CKD-EPI 2021: 77 ML/MIN/1.73M2
EOSINOPHIL # BLD AUTO: 0 10E3/UL (ref 0–0.7)
EOSINOPHIL # BLD AUTO: 0.3 10E3/UL (ref 0–0.7)
EOSINOPHIL # BLD AUTO: 0.3 10E3/UL (ref 0–0.7)
EOSINOPHIL # BLD AUTO: 0.5 10E3/UL (ref 0–0.7)
EOSINOPHIL # BLD AUTO: 0.5 10E3/UL (ref 0–0.7)
EOSINOPHIL # BLD AUTO: 0.7 10E3/UL (ref 0–0.7)
EOSINOPHIL # BLD AUTO: 0.8 10E3/UL (ref 0–0.7)
EOSINOPHIL NFR BLD AUTO: 0 %
EOSINOPHIL NFR BLD AUTO: 11 %
EOSINOPHIL NFR BLD AUTO: 13 %
EOSINOPHIL NFR BLD AUTO: 13 %
EOSINOPHIL NFR BLD AUTO: 15 %
EOSINOPHIL NFR BLD AUTO: 3 %
EOSINOPHIL NFR BLD AUTO: 3 %
EOSINOPHIL NFR BLD AUTO: 4 %
EOSINOPHIL NFR BLD AUTO: 5 %
EPEC EAE GENE STL QL NAA+NON-PROBE: NEGATIVE
EPEC EAE GENE STL QL NAA+NON-PROBE: NEGATIVE
ERYTHROCYTE [DISTWIDTH] IN BLOOD BY AUTOMATED COUNT: 13 % (ref 10–15)
ERYTHROCYTE [DISTWIDTH] IN BLOOD BY AUTOMATED COUNT: 13.5 % (ref 10–15)
ERYTHROCYTE [DISTWIDTH] IN BLOOD BY AUTOMATED COUNT: 13.5 % (ref 10–15)
ERYTHROCYTE [DISTWIDTH] IN BLOOD BY AUTOMATED COUNT: 13.7 % (ref 10–15)
ERYTHROCYTE [DISTWIDTH] IN BLOOD BY AUTOMATED COUNT: 13.8 % (ref 10–15)
ERYTHROCYTE [DISTWIDTH] IN BLOOD BY AUTOMATED COUNT: 13.8 % (ref 10–15)
ERYTHROCYTE [DISTWIDTH] IN BLOOD BY AUTOMATED COUNT: 14 % (ref 10–15)
ERYTHROCYTE [DISTWIDTH] IN BLOOD BY AUTOMATED COUNT: 14.1 % (ref 10–15)
ERYTHROCYTE [DISTWIDTH] IN BLOOD BY AUTOMATED COUNT: 14.3 % (ref 10–15)
ERYTHROCYTE [DISTWIDTH] IN BLOOD BY AUTOMATED COUNT: 14.4 % (ref 10–15)
ERYTHROCYTE [DISTWIDTH] IN BLOOD BY AUTOMATED COUNT: 14.6 % (ref 10–15)
ERYTHROCYTE [DISTWIDTH] IN BLOOD BY AUTOMATED COUNT: 14.9 % (ref 10–15)
ERYTHROCYTE [DISTWIDTH] IN BLOOD BY AUTOMATED COUNT: 15 % (ref 10–15)
ERYTHROCYTE [DISTWIDTH] IN BLOOD BY AUTOMATED COUNT: 15.2 % (ref 10–15)
ETEC LTA+ST1A+ST1B TOX ST NAA+NON-PROBE: NEGATIVE
ETEC LTA+ST1A+ST1B TOX ST NAA+NON-PROBE: NEGATIVE
FERRITIN SERPL-MCNC: 211 NG/ML (ref 31–409)
FLUAV RNA SPEC QL NAA+PROBE: NEGATIVE
FLUAV RNA SPEC QL NAA+PROBE: NEGATIVE
FLUBV RNA RESP QL NAA+PROBE: NEGATIVE
FLUBV RNA RESP QL NAA+PROBE: NEGATIVE
G LAMBLIA DNA STL QL NAA+NON-PROBE: NEGATIVE
G LAMBLIA DNA STL QL NAA+NON-PROBE: NEGATIVE
GFR SERPL CREATININE-BSD FRML MDRD: 39 ML/MIN/1.73M2
GFR SERPL CREATININE-BSD FRML MDRD: 46 ML/MIN/1.73M2
GFR SERPL CREATININE-BSD FRML MDRD: 51 ML/MIN/1.73M2
GFR SERPL CREATININE-BSD FRML MDRD: 52 ML/MIN/1.73M2
GFR SERPL CREATININE-BSD FRML MDRD: 60 ML/MIN/1.73M2
GFR SERPL CREATININE-BSD FRML MDRD: 64 ML/MIN/1.73M2
GLUCOSE SERPL-MCNC: 108 MG/DL (ref 70–99)
GLUCOSE SERPL-MCNC: 109 MG/DL (ref 70–99)
GLUCOSE SERPL-MCNC: 109 MG/DL (ref 70–99)
GLUCOSE SERPL-MCNC: 122 MG/DL (ref 70–99)
GLUCOSE SERPL-MCNC: 124 MG/DL (ref 70–99)
GLUCOSE SERPL-MCNC: 128 MG/DL (ref 70–99)
GLUCOSE SERPL-MCNC: 133 MG/DL (ref 70–99)
GLUCOSE SERPL-MCNC: 143 MG/DL (ref 70–99)
GLUCOSE SERPL-MCNC: 160 MG/DL (ref 70–99)
GLUCOSE SERPL-MCNC: 166 MG/DL (ref 70–99)
GLUCOSE SERPL-MCNC: 169 MG/DL (ref 70–99)
GLUCOSE SERPL-MCNC: 83 MG/DL (ref 70–99)
GLUCOSE SERPL-MCNC: 97 MG/DL (ref 70–99)
GLUCOSE SERPL-MCNC: 97 MG/DL (ref 70–99)
GLUCOSE UR STRIP-MCNC: NEGATIVE MG/DL
GLUCOSE UR STRIP-MCNC: NEGATIVE MG/DL
HCO3 BLDV-SCNC: 21 MMOL/L (ref 21–28)
HCO3 BLDV-SCNC: 22 MMOL/L (ref 21–28)
HCO3 BLDV-SCNC: 27 MMOL/L (ref 21–28)
HCO3 BLDV-SCNC: 30 MMOL/L (ref 21–28)
HCT VFR BLD AUTO: 31.3 % (ref 40–53)
HCT VFR BLD AUTO: 31.6 % (ref 40–53)
HCT VFR BLD AUTO: 33.6 % (ref 40–53)
HCT VFR BLD AUTO: 33.8 % (ref 40–53)
HCT VFR BLD AUTO: 34.5 % (ref 40–53)
HCT VFR BLD AUTO: 34.7 % (ref 40–53)
HCT VFR BLD AUTO: 35 % (ref 40–53)
HCT VFR BLD AUTO: 35.1 % (ref 40–53)
HCT VFR BLD AUTO: 35.5 % (ref 40–53)
HCT VFR BLD AUTO: 35.9 % (ref 40–53)
HCT VFR BLD AUTO: 36.2 % (ref 40–53)
HCT VFR BLD AUTO: 38.1 % (ref 40–53)
HCT VFR BLD AUTO: 38.1 % (ref 40–53)
HCT VFR BLD AUTO: 38.6 % (ref 40–53)
HCT VFR BLD AUTO: 38.9 % (ref 40–53)
HCT VFR BLD AUTO: 39.4 % (ref 40–53)
HGB BLD-MCNC: 10.1 G/DL (ref 13.3–17.7)
HGB BLD-MCNC: 10.4 G/DL (ref 13.3–17.7)
HGB BLD-MCNC: 10.6 G/DL (ref 13.3–17.7)
HGB BLD-MCNC: 10.7 G/DL (ref 13.3–17.7)
HGB BLD-MCNC: 10.7 G/DL (ref 13.3–17.7)
HGB BLD-MCNC: 10.8 G/DL (ref 13.3–17.7)
HGB BLD-MCNC: 10.8 G/DL (ref 13.3–17.7)
HGB BLD-MCNC: 11.1 G/DL (ref 13.3–17.7)
HGB BLD-MCNC: 11.1 G/DL (ref 13.3–17.7)
HGB BLD-MCNC: 11.2 G/DL (ref 13.3–17.7)
HGB BLD-MCNC: 11.5 G/DL (ref 13.3–17.7)
HGB BLD-MCNC: 11.7 G/DL (ref 13.3–17.7)
HGB BLD-MCNC: 11.9 G/DL (ref 13.3–17.7)
HGB BLD-MCNC: 12 G/DL (ref 13.3–17.7)
HGB BLD-MCNC: 12.2 G/DL (ref 13.3–17.7)
HGB BLD-MCNC: 9.8 G/DL (ref 13.3–17.7)
HGB UR QL STRIP: ABNORMAL
HGB UR QL STRIP: ABNORMAL
HOLD SPECIMEN: NORMAL
HYALINE CASTS: 1 /LPF
IGM SERPL-MCNC: 106 MG/DL (ref 35–242)
IMM GRANULOCYTES # BLD: 0 10E3/UL
IMM GRANULOCYTES # BLD: 0.1 10E3/UL
IMM GRANULOCYTES NFR BLD: 0 %
INR PPP: 1.15 (ref 0.85–1.15)
INTERPRETATION ECG - MUSE: NORMAL
IRON BINDING CAPACITY (ROCHE): 165 UG/DL (ref 240–430)
IRON SATN MFR SERPL: 8 % (ref 15–46)
IRON SERPL-MCNC: 13 UG/DL (ref 61–157)
KETONES UR STRIP-MCNC: NEGATIVE MG/DL
KETONES UR STRIP-MCNC: NEGATIVE MG/DL
L PNEUMO1 AG UR QL IA: NEGATIVE
LACTATE BLD-SCNC: 0.6 MMOL/L
LACTATE BLD-SCNC: 0.8 MMOL/L
LACTATE BLD-SCNC: 1.9 MMOL/L
LACTATE BLD-SCNC: 3.5 MMOL/L
LACTATE SERPL-SCNC: 1 MMOL/L (ref 0.7–2)
LEUKOCYTE ESTERASE UR QL STRIP: NEGATIVE
LEUKOCYTE ESTERASE UR QL STRIP: NEGATIVE
LIPASE SERPL-CCNC: 12 U/L (ref 13–60)
LIPASE SERPL-CCNC: 14 U/L (ref 13–60)
LIPASE SERPL-CCNC: 16 U/L (ref 13–60)
LIPASE SERPL-CCNC: 31 U/L (ref 13–60)
LVEF ECHO: NORMAL
LYMPHOCYTES # BLD AUTO: 0.4 10E3/UL (ref 0.8–5.3)
LYMPHOCYTES # BLD AUTO: 0.6 10E3/UL (ref 0.8–5.3)
LYMPHOCYTES # BLD AUTO: 0.6 10E3/UL (ref 0.8–5.3)
LYMPHOCYTES # BLD AUTO: 0.7 10E3/UL (ref 0.8–5.3)
LYMPHOCYTES # BLD AUTO: 0.8 10E3/UL (ref 0.8–5.3)
LYMPHOCYTES # BLD AUTO: 0.9 10E3/UL (ref 0.8–5.3)
LYMPHOCYTES # BLD AUTO: 1 10E3/UL (ref 0.8–5.3)
LYMPHOCYTES # BLD AUTO: 1 10E3/UL (ref 0.8–5.3)
LYMPHOCYTES # BLD AUTO: 1.2 10E3/UL (ref 0.8–5.3)
LYMPHOCYTES NFR BLD AUTO: 12 %
LYMPHOCYTES NFR BLD AUTO: 14 %
LYMPHOCYTES NFR BLD AUTO: 17 %
LYMPHOCYTES NFR BLD AUTO: 2 %
LYMPHOCYTES NFR BLD AUTO: 21 %
LYMPHOCYTES NFR BLD AUTO: 4 %
LYMPHOCYTES NFR BLD AUTO: 5 %
LYMPHOCYTES NFR BLD AUTO: 8 %
LYMPHOCYTES NFR BLD AUTO: 9 %
MAGNESIUM SERPL-MCNC: 1.7 MG/DL (ref 1.7–2.3)
MAGNESIUM SERPL-MCNC: 1.8 MG/DL (ref 1.7–2.3)
MAGNESIUM SERPL-MCNC: 1.9 MG/DL (ref 1.7–2.3)
MAGNESIUM SERPL-MCNC: 2 MG/DL (ref 1.7–2.3)
MAGNESIUM SERPL-MCNC: 2.1 MG/DL (ref 1.7–2.3)
MCH RBC QN AUTO: 25.8 PG (ref 26.5–33)
MCH RBC QN AUTO: 25.9 PG (ref 26.5–33)
MCH RBC QN AUTO: 26 PG (ref 26.5–33)
MCH RBC QN AUTO: 26 PG (ref 26.5–33)
MCH RBC QN AUTO: 26.3 PG (ref 26.5–33)
MCH RBC QN AUTO: 26.4 PG (ref 26.5–33)
MCH RBC QN AUTO: 26.5 PG (ref 26.5–33)
MCH RBC QN AUTO: 26.6 PG (ref 26.5–33)
MCH RBC QN AUTO: 26.9 PG (ref 26.5–33)
MCH RBC QN AUTO: 27.1 PG (ref 26.5–33)
MCH RBC QN AUTO: 27.1 PG (ref 26.5–33)
MCH RBC QN AUTO: 27.2 PG (ref 26.5–33)
MCH RBC QN AUTO: 27.4 PG (ref 26.5–33)
MCH RBC QN AUTO: 28 PG (ref 26.5–33)
MCHC RBC AUTO-ENTMCNC: 29.7 G/DL (ref 31.5–36.5)
MCHC RBC AUTO-ENTMCNC: 30.1 G/DL (ref 31.5–36.5)
MCHC RBC AUTO-ENTMCNC: 30.2 G/DL (ref 31.5–36.5)
MCHC RBC AUTO-ENTMCNC: 30.4 G/DL (ref 31.5–36.5)
MCHC RBC AUTO-ENTMCNC: 30.5 G/DL (ref 31.5–36.5)
MCHC RBC AUTO-ENTMCNC: 30.8 G/DL (ref 31.5–36.5)
MCHC RBC AUTO-ENTMCNC: 30.9 G/DL (ref 31.5–36.5)
MCHC RBC AUTO-ENTMCNC: 31 G/DL (ref 31.5–36.5)
MCHC RBC AUTO-ENTMCNC: 31 G/DL (ref 31.5–36.5)
MCHC RBC AUTO-ENTMCNC: 31.5 G/DL (ref 31.5–36.5)
MCHC RBC AUTO-ENTMCNC: 31.7 G/DL (ref 31.5–36.5)
MCHC RBC AUTO-ENTMCNC: 32 G/DL (ref 31.5–36.5)
MCHC RBC AUTO-ENTMCNC: 32 G/DL (ref 31.5–36.5)
MCHC RBC AUTO-ENTMCNC: 32.3 G/DL (ref 31.5–36.5)
MCV RBC AUTO: 84 FL (ref 78–100)
MCV RBC AUTO: 84 FL (ref 78–100)
MCV RBC AUTO: 85 FL (ref 78–100)
MCV RBC AUTO: 86 FL (ref 78–100)
MCV RBC AUTO: 87 FL (ref 78–100)
MCV RBC AUTO: 88 FL (ref 78–100)
MONOCYTES # BLD AUTO: 0.6 10E3/UL (ref 0–1.3)
MONOCYTES # BLD AUTO: 0.7 10E3/UL (ref 0–1.3)
MONOCYTES # BLD AUTO: 0.7 10E3/UL (ref 0–1.3)
MONOCYTES # BLD AUTO: 0.8 10E3/UL (ref 0–1.3)
MONOCYTES # BLD AUTO: 0.8 10E3/UL (ref 0–1.3)
MONOCYTES # BLD AUTO: 0.9 10E3/UL (ref 0–1.3)
MONOCYTES # BLD AUTO: 1.3 10E3/UL (ref 0–1.3)
MONOCYTES # BLD AUTO: 1.5 10E3/UL (ref 0–1.3)
MONOCYTES # BLD AUTO: 1.7 10E3/UL (ref 0–1.3)
MONOCYTES NFR BLD AUTO: 10 %
MONOCYTES NFR BLD AUTO: 11 %
MONOCYTES NFR BLD AUTO: 11 %
MONOCYTES NFR BLD AUTO: 12 %
MONOCYTES NFR BLD AUTO: 13 %
MONOCYTES NFR BLD AUTO: 16 %
MONOCYTES NFR BLD AUTO: 7 %
MONOCYTES NFR BLD AUTO: 7 %
MONOCYTES NFR BLD AUTO: 8 %
MUCOUS THREADS #/AREA URNS LPF: PRESENT /LPF
MUCOUS THREADS #/AREA URNS LPF: PRESENT /LPF
NEUTROPHILS # BLD AUTO: 23 10E3/UL (ref 1.6–8.3)
NEUTROPHILS # BLD AUTO: 3 10E3/UL (ref 1.6–8.3)
NEUTROPHILS # BLD AUTO: 3 10E3/UL (ref 1.6–8.3)
NEUTROPHILS # BLD AUTO: 3.7 10E3/UL (ref 1.6–8.3)
NEUTROPHILS # BLD AUTO: 4.6 10E3/UL (ref 1.6–8.3)
NEUTROPHILS # BLD AUTO: 6.8 10E3/UL (ref 1.6–8.3)
NEUTROPHILS # BLD AUTO: 8.8 10E3/UL (ref 1.6–8.3)
NEUTROPHILS # BLD AUTO: 9.7 10E3/UL (ref 1.6–8.3)
NEUTROPHILS # BLD AUTO: 9.8 10E3/UL (ref 1.6–8.3)
NEUTROPHILS NFR BLD AUTO: 53 %
NEUTROPHILS NFR BLD AUTO: 56 %
NEUTROPHILS NFR BLD AUTO: 63 %
NEUTROPHILS NFR BLD AUTO: 64 %
NEUTROPHILS NFR BLD AUTO: 73 %
NEUTROPHILS NFR BLD AUTO: 75 %
NEUTROPHILS NFR BLD AUTO: 83 %
NEUTROPHILS NFR BLD AUTO: 86 %
NEUTROPHILS NFR BLD AUTO: 91 %
NITRATE UR QL: NEGATIVE
NITRATE UR QL: NEGATIVE
NOROVIRUS GI+II RNA STL QL NAA+NON-PROBE: NEGATIVE
NOROVIRUS GI+II RNA STL QL NAA+NON-PROBE: NEGATIVE
NRBC # BLD AUTO: 0 10E3/UL
NRBC BLD AUTO-RTO: 0 /100
NT-PROBNP SERPL-MCNC: 4535 PG/ML (ref 0–1800)
NT-PROBNP SERPL-MCNC: 6480 PG/ML (ref 0–1800)
P AXIS - MUSE: -87 DEGREES
P AXIS - MUSE: NORMAL DEGREES
P SHIGELLOIDES DNA STL QL NAA+NON-PROBE: NEGATIVE
P SHIGELLOIDES DNA STL QL NAA+NON-PROBE: NEGATIVE
PCO2 BLDV: 48 MM HG (ref 40–50)
PCO2 BLDV: 50 MM HG (ref 40–50)
PCO2 BLDV: 54 MM HG (ref 40–50)
PCO2 BLDV: 60 MM HG (ref 40–50)
PH BLDV: 7.23 [PH] (ref 7.32–7.43)
PH BLDV: 7.27 [PH] (ref 7.32–7.43)
PH BLDV: 7.3 [PH] (ref 7.32–7.43)
PH BLDV: 7.31 [PH] (ref 7.32–7.43)
PH UR STRIP: 5 [PH] (ref 5–7)
PH UR STRIP: 5 [PH] (ref 5–7)
PLATELET # BLD AUTO: 105 10E3/UL (ref 150–450)
PLATELET # BLD AUTO: 117 10E3/UL (ref 150–450)
PLATELET # BLD AUTO: 122 10E3/UL (ref 150–450)
PLATELET # BLD AUTO: 131 10E3/UL (ref 150–450)
PLATELET # BLD AUTO: 137 10E3/UL (ref 150–450)
PLATELET # BLD AUTO: 142 10E3/UL (ref 150–450)
PLATELET # BLD AUTO: 150 10E3/UL (ref 150–450)
PLATELET # BLD AUTO: 150 10E3/UL (ref 150–450)
PLATELET # BLD AUTO: 156 10E3/UL (ref 150–450)
PLATELET # BLD AUTO: 162 10E3/UL (ref 150–450)
PLATELET # BLD AUTO: 166 10E3/UL (ref 150–450)
PLATELET # BLD AUTO: 169 10E3/UL (ref 150–450)
PLATELET # BLD AUTO: 177 10E3/UL (ref 150–450)
PLATELET # BLD AUTO: 188 10E3/UL (ref 150–450)
PLATELET # BLD AUTO: 192 10E3/UL (ref 150–450)
PLATELET # BLD AUTO: 230 10E3/UL (ref 150–450)
PO2 BLDV: 18 MM HG (ref 25–47)
PO2 BLDV: 19 MM HG (ref 25–47)
PO2 BLDV: 32 MM HG (ref 25–47)
PO2 BLDV: 49 MM HG (ref 25–47)
POTASSIUM SERPL-SCNC: 3.1 MMOL/L (ref 3.4–5.3)
POTASSIUM SERPL-SCNC: 3.2 MMOL/L (ref 3.4–5.3)
POTASSIUM SERPL-SCNC: 3.3 MMOL/L (ref 3.4–5.3)
POTASSIUM SERPL-SCNC: 3.3 MMOL/L (ref 3.4–5.3)
POTASSIUM SERPL-SCNC: 3.5 MMOL/L (ref 3.4–5.3)
POTASSIUM SERPL-SCNC: 3.6 MMOL/L (ref 3.4–5.3)
POTASSIUM SERPL-SCNC: 3.8 MMOL/L (ref 3.4–5.3)
POTASSIUM SERPL-SCNC: 3.8 MMOL/L (ref 3.4–5.3)
POTASSIUM SERPL-SCNC: 3.9 MMOL/L (ref 3.4–5.3)
POTASSIUM SERPL-SCNC: 4 MMOL/L (ref 3.4–5.3)
POTASSIUM SERPL-SCNC: 4.2 MMOL/L (ref 3.4–5.3)
POTASSIUM SERPL-SCNC: 4.3 MMOL/L (ref 3.4–5.3)
POTASSIUM SERPL-SCNC: 4.8 MMOL/L (ref 3.4–5.3)
POTASSIUM SERPL-SCNC: 5.1 MMOL/L (ref 3.4–5.3)
PR INTERVAL - MUSE: NORMAL MS
PROCALCITONIN SERPL IA-MCNC: 0.21 NG/ML
PROT SERPL-MCNC: 5.8 G/DL (ref 6.4–8.3)
PROT SERPL-MCNC: 6.4 G/DL (ref 6.4–8.3)
PROT SERPL-MCNC: 6.7 G/DL (ref 6.4–8.3)
PROT SERPL-MCNC: 6.9 G/DL (ref 6.4–8.3)
PSA SERPL DL<=0.01 NG/ML-MCNC: 2.69 NG/ML
QRS DURATION - MUSE: 134 MS
QRS DURATION - MUSE: 136 MS
QT - MUSE: 318 MS
QT - MUSE: 320 MS
QT - MUSE: 336 MS
QT - MUSE: 350 MS
QTC - MUSE: 418 MS
QTC - MUSE: 430 MS
QTC - MUSE: 448 MS
QTC - MUSE: 511 MS
R AXIS - MUSE: -25 DEGREES
R AXIS - MUSE: -26 DEGREES
R AXIS - MUSE: -29 DEGREES
R AXIS - MUSE: 14 DEGREES
RBC # BLD AUTO: 3.69 10E6/UL (ref 4.4–5.9)
RBC # BLD AUTO: 3.69 10E6/UL (ref 4.4–5.9)
RBC # BLD AUTO: 3.89 10E6/UL (ref 4.4–5.9)
RBC # BLD AUTO: 3.96 10E6/UL (ref 4.4–5.9)
RBC # BLD AUTO: 4.06 10E6/UL (ref 4.4–5.9)
RBC # BLD AUTO: 4.09 10E6/UL (ref 4.4–5.9)
RBC # BLD AUTO: 4.11 10E6/UL (ref 4.4–5.9)
RBC # BLD AUTO: 4.11 10E6/UL (ref 4.4–5.9)
RBC # BLD AUTO: 4.12 10E6/UL (ref 4.4–5.9)
RBC # BLD AUTO: 4.19 10E6/UL (ref 4.4–5.9)
RBC # BLD AUTO: 4.23 10E6/UL (ref 4.4–5.9)
RBC # BLD AUTO: 4.36 10E6/UL (ref 4.4–5.9)
RBC # BLD AUTO: 4.37 10E6/UL (ref 4.4–5.9)
RBC # BLD AUTO: 4.39 10E6/UL (ref 4.4–5.9)
RBC # BLD AUTO: 4.51 10E6/UL (ref 4.4–5.9)
RBC # BLD AUTO: 4.61 10E6/UL (ref 4.4–5.9)
RBC URINE: 14 /HPF
RBC URINE: 16 /HPF
RSV RNA SPEC NAA+PROBE: NEGATIVE
RSV RNA SPEC NAA+PROBE: NEGATIVE
RVA RNA STL QL NAA+NON-PROBE: NEGATIVE
RVA RNA STL QL NAA+NON-PROBE: NEGATIVE
S PNEUM AG SPEC QL: NEGATIVE
SALMONELLA SP RPOD STL QL NAA+PROBE: NEGATIVE
SALMONELLA SP RPOD STL QL NAA+PROBE: NEGATIVE
SAO2 % BLDV: 20 % (ref 94–100)
SAO2 % BLDV: 23 % (ref 94–100)
SAO2 % BLDV: 51 % (ref 94–100)
SAO2 % BLDV: 79 % (ref 94–100)
SAPO I+II+IV+V RNA STL QL NAA+NON-PROBE: NEGATIVE
SAPO I+II+IV+V RNA STL QL NAA+NON-PROBE: NEGATIVE
SARS-COV-2 RNA RESP QL NAA+PROBE: NEGATIVE
SARS-COV-2 RNA RESP QL NAA+PROBE: NEGATIVE
SHIGELLA SP+EIEC IPAH ST NAA+NON-PROBE: NEGATIVE
SHIGELLA SP+EIEC IPAH ST NAA+NON-PROBE: NEGATIVE
SODIUM SERPL-SCNC: 135 MMOL/L (ref 136–145)
SODIUM SERPL-SCNC: 136 MMOL/L (ref 136–145)
SODIUM SERPL-SCNC: 136 MMOL/L (ref 136–145)
SODIUM SERPL-SCNC: 137 MMOL/L (ref 135–145)
SODIUM SERPL-SCNC: 137 MMOL/L (ref 135–145)
SODIUM SERPL-SCNC: 137 MMOL/L (ref 136–145)
SODIUM SERPL-SCNC: 137 MMOL/L (ref 136–145)
SODIUM SERPL-SCNC: 138 MMOL/L (ref 136–145)
SODIUM SERPL-SCNC: 139 MMOL/L (ref 135–145)
SODIUM SERPL-SCNC: 139 MMOL/L (ref 136–145)
SODIUM SERPL-SCNC: 141 MMOL/L (ref 136–145)
SODIUM SERPL-SCNC: 141 MMOL/L (ref 136–145)
SODIUM SERPL-SCNC: 143 MMOL/L (ref 136–145)
SODIUM SERPL-SCNC: 145 MMOL/L (ref 135–145)
SP GR UR STRIP: 1.02 (ref 1–1.03)
SP GR UR STRIP: 1.02 (ref 1–1.03)
SPECIMEN EXPIRATION DATE: NORMAL
SQUAMOUS EPITHELIAL: <1 /HPF
SYSTOLIC BLOOD PRESSURE - MUSE: NORMAL MMHG
T AXIS - MUSE: 63 DEGREES
T AXIS - MUSE: 73 DEGREES
T AXIS - MUSE: 79 DEGREES
T AXIS - MUSE: 79 DEGREES
TROPONIN T SERPL HS-MCNC: 59 NG/L
TROPONIN T SERPL HS-MCNC: 62 NG/L
TROPONIN T SERPL HS-MCNC: 80 NG/L
TROPONIN T SERPL HS-MCNC: 83 NG/L
TROPONIN T SERPL HS-MCNC: 97 NG/L
TSH SERPL DL<=0.005 MIU/L-ACNC: 3.54 UIU/ML (ref 0.3–4.2)
UFH PPP CHRO-ACNC: 0.19 IU/ML
UFH PPP CHRO-ACNC: 0.49 IU/ML
UFH PPP CHRO-ACNC: 0.66 IU/ML
UFH PPP CHRO-ACNC: <0.1 IU/ML
UROBILINOGEN UR STRIP-MCNC: NORMAL MG/DL
UROBILINOGEN UR STRIP-MCNC: NORMAL MG/DL
V CHOLERAE DNA SPEC QL NAA+PROBE: NEGATIVE
V CHOLERAE DNA SPEC QL NAA+PROBE: NEGATIVE
VENTRICULAR RATE- MUSE: 104 BPM
VENTRICULAR RATE- MUSE: 107 BPM
VENTRICULAR RATE- MUSE: 109 BPM
VENTRICULAR RATE- MUSE: 128 BPM
VIBRIO DNA SPEC NAA+PROBE: NEGATIVE
VIBRIO DNA SPEC NAA+PROBE: NEGATIVE
VIT B12 SERPL-MCNC: 595 PG/ML (ref 232–1245)
WBC # BLD AUTO: 11.3 10E3/UL (ref 4–11)
WBC # BLD AUTO: 11.7 10E3/UL (ref 4–11)
WBC # BLD AUTO: 11.8 10E3/UL (ref 4–11)
WBC # BLD AUTO: 12.1 10E3/UL (ref 4–11)
WBC # BLD AUTO: 12.8 10E3/UL (ref 4–11)
WBC # BLD AUTO: 20.2 10E3/UL (ref 4–11)
WBC # BLD AUTO: 25.5 10E3/UL (ref 4–11)
WBC # BLD AUTO: 5.4 10E3/UL (ref 4–11)
WBC # BLD AUTO: 5.6 10E3/UL (ref 4–11)
WBC # BLD AUTO: 5.7 10E3/UL (ref 4–11)
WBC # BLD AUTO: 6.8 10E3/UL (ref 4–11)
WBC # BLD AUTO: 7.1 10E3/UL (ref 4–11)
WBC # BLD AUTO: 7.3 10E3/UL (ref 4–11)
WBC # BLD AUTO: 8.8 10E3/UL (ref 4–11)
WBC # BLD AUTO: 9.4 10E3/UL (ref 4–11)
WBC # BLD AUTO: 9.6 10E3/UL (ref 4–11)
WBC URINE: 2 /HPF
WBC URINE: 3 /HPF
Y ENTEROCOL DNA STL QL NAA+PROBE: NEGATIVE
Y ENTEROCOL DNA STL QL NAA+PROBE: NEGATIVE

## 2023-01-01 PROCEDURE — G0008 ADMIN INFLUENZA VIRUS VAC: HCPCS | Performed by: PHYSICIAN ASSISTANT

## 2023-01-01 PROCEDURE — 70450 CT HEAD/BRAIN W/O DYE: CPT

## 2023-01-01 PROCEDURE — 99213 OFFICE O/P EST LOW 20 MIN: CPT | Performed by: INTERNAL MEDICINE

## 2023-01-01 PROCEDURE — 36415 COLL VENOUS BLD VENIPUNCTURE: CPT | Performed by: INTERNAL MEDICINE

## 2023-01-01 PROCEDURE — 99213 OFFICE O/P EST LOW 20 MIN: CPT | Mod: 25 | Performed by: NURSE PRACTITIONER

## 2023-01-01 PROCEDURE — 87493 C DIFF AMPLIFIED PROBE: CPT | Performed by: INTERNAL MEDICINE

## 2023-01-01 PROCEDURE — 99222 1ST HOSP IP/OBS MODERATE 55: CPT | Performed by: INTERNAL MEDICINE

## 2023-01-01 PROCEDURE — 97110 THERAPEUTIC EXERCISES: CPT | Mod: GP | Performed by: PHYSICAL THERAPIST

## 2023-01-01 PROCEDURE — 85027 COMPLETE CBC AUTOMATED: CPT | Performed by: INTERNAL MEDICINE

## 2023-01-01 PROCEDURE — 99232 SBSQ HOSP IP/OBS MODERATE 35: CPT | Performed by: INTERNAL MEDICINE

## 2023-01-01 PROCEDURE — 90480 ADMN SARSCOV2 VAC 1/ONLY CMP: CPT | Performed by: INTERNAL MEDICINE

## 2023-01-01 PROCEDURE — 90662 IIV NO PRSV INCREASED AG IM: CPT | Performed by: PHYSICIAN ASSISTANT

## 2023-01-01 PROCEDURE — 86900 BLOOD TYPING SEROLOGIC ABO: CPT | Performed by: SOCIAL WORKER

## 2023-01-01 PROCEDURE — 74176 CT ABD & PELVIS W/O CONTRAST: CPT

## 2023-01-01 PROCEDURE — 83605 ASSAY OF LACTIC ACID: CPT

## 2023-01-01 PROCEDURE — 97112 NEUROMUSCULAR REEDUCATION: CPT | Mod: GP

## 2023-01-01 PROCEDURE — 87899 AGENT NOS ASSAY W/OPTIC: CPT | Performed by: INTERNAL MEDICINE

## 2023-01-01 PROCEDURE — 97530 THERAPEUTIC ACTIVITIES: CPT | Mod: GO

## 2023-01-01 PROCEDURE — 85025 COMPLETE CBC W/AUTO DIFF WBC: CPT | Performed by: NURSE PRACTITIONER

## 2023-01-01 PROCEDURE — 80048 BASIC METABOLIC PNL TOTAL CA: CPT | Performed by: INTERNAL MEDICINE

## 2023-01-01 PROCEDURE — 85520 HEPARIN ASSAY: CPT | Performed by: INTERNAL MEDICINE

## 2023-01-01 PROCEDURE — 250N000011 HC RX IP 250 OP 636: Performed by: INTERNAL MEDICINE

## 2023-01-01 PROCEDURE — 250N000013 HC RX MED GY IP 250 OP 250 PS 637: Performed by: STUDENT IN AN ORGANIZED HEALTH CARE EDUCATION/TRAINING PROGRAM

## 2023-01-01 PROCEDURE — 999N000128 HC STATISTIC PERIPHERAL IV START W/O US GUIDANCE

## 2023-01-01 PROCEDURE — 84132 ASSAY OF SERUM POTASSIUM: CPT | Performed by: INTERNAL MEDICINE

## 2023-01-01 PROCEDURE — 95992 CANALITH REPOSITIONING PROC: CPT | Mod: GP

## 2023-01-01 PROCEDURE — 250N000013 HC RX MED GY IP 250 OP 250 PS 637: Performed by: INTERNAL MEDICINE

## 2023-01-01 PROCEDURE — 999N000208 ECHOCARDIOGRAM COMPLETE

## 2023-01-01 PROCEDURE — 83735 ASSAY OF MAGNESIUM: CPT | Performed by: INTERNAL MEDICINE

## 2023-01-01 PROCEDURE — 97110 THERAPEUTIC EXERCISES: CPT | Mod: GP

## 2023-01-01 PROCEDURE — 120N000001 HC R&B MED SURG/OB

## 2023-01-01 PROCEDURE — 97530 THERAPEUTIC ACTIVITIES: CPT | Mod: GP

## 2023-01-01 PROCEDURE — 83880 ASSAY OF NATRIURETIC PEPTIDE: CPT | Performed by: EMERGENCY MEDICINE

## 2023-01-01 PROCEDURE — 250N000009 HC RX 250: Performed by: INTERNAL MEDICINE

## 2023-01-01 PROCEDURE — 36415 COLL VENOUS BLD VENIPUNCTURE: CPT | Performed by: PHYSICIAN ASSISTANT

## 2023-01-01 PROCEDURE — 258N000003 HC RX IP 258 OP 636: Performed by: INTERNAL MEDICINE

## 2023-01-01 PROCEDURE — 84153 ASSAY OF PSA TOTAL: CPT | Performed by: INTERNAL MEDICINE

## 2023-01-01 PROCEDURE — 85025 COMPLETE CBC W/AUTO DIFF WBC: CPT | Performed by: EMERGENCY MEDICINE

## 2023-01-01 PROCEDURE — 99285 EMERGENCY DEPT VISIT HI MDM: CPT | Mod: 25

## 2023-01-01 PROCEDURE — 85610 PROTHROMBIN TIME: CPT | Performed by: SOCIAL WORKER

## 2023-01-01 PROCEDURE — 250N000013 HC RX MED GY IP 250 OP 250 PS 637: Performed by: EMERGENCY MEDICINE

## 2023-01-01 PROCEDURE — 99223 1ST HOSP IP/OBS HIGH 75: CPT | Performed by: INTERNAL MEDICINE

## 2023-01-01 PROCEDURE — 250N000011 HC RX IP 250 OP 636: Mod: JZ | Performed by: INTERNAL MEDICINE

## 2023-01-01 PROCEDURE — 84145 PROCALCITONIN (PCT): CPT | Performed by: EMERGENCY MEDICINE

## 2023-01-01 PROCEDURE — 36415 COLL VENOUS BLD VENIPUNCTURE: CPT | Performed by: EMERGENCY MEDICINE

## 2023-01-01 PROCEDURE — 71045 X-RAY EXAM CHEST 1 VIEW: CPT

## 2023-01-01 PROCEDURE — 97535 SELF CARE MNGMENT TRAINING: CPT | Mod: GO

## 2023-01-01 PROCEDURE — 80053 COMPREHEN METABOLIC PANEL: CPT | Performed by: INTERNAL MEDICINE

## 2023-01-01 PROCEDURE — 96361 HYDRATE IV INFUSION ADD-ON: CPT

## 2023-01-01 PROCEDURE — 80048 BASIC METABOLIC PNL TOTAL CA: CPT | Performed by: PHYSICIAN ASSISTANT

## 2023-01-01 PROCEDURE — 82550 ASSAY OF CK (CPK): CPT | Performed by: SOCIAL WORKER

## 2023-01-01 PROCEDURE — 97530 THERAPEUTIC ACTIVITIES: CPT | Mod: GP | Performed by: PHYSICAL THERAPIST

## 2023-01-01 PROCEDURE — 99214 OFFICE O/P EST MOD 30 MIN: CPT | Mod: 25 | Performed by: INTERNAL MEDICINE

## 2023-01-01 PROCEDURE — 99232 SBSQ HOSP IP/OBS MODERATE 35: CPT | Performed by: SPECIALIST

## 2023-01-01 PROCEDURE — 250N000011 HC RX IP 250 OP 636: Performed by: EMERGENCY MEDICINE

## 2023-01-01 PROCEDURE — 99606 MTMS BY PHARM EST 15 MIN: CPT | Performed by: PHARMACIST

## 2023-01-01 PROCEDURE — 87493 C DIFF AMPLIFIED PROBE: CPT | Performed by: EMERGENCY MEDICINE

## 2023-01-01 PROCEDURE — 96365 THER/PROPH/DIAG IV INF INIT: CPT | Mod: 59

## 2023-01-01 PROCEDURE — 97162 PT EVAL MOD COMPLEX 30 MIN: CPT | Mod: GP

## 2023-01-01 PROCEDURE — 84443 ASSAY THYROID STIM HORMONE: CPT | Performed by: INTERNAL MEDICINE

## 2023-01-01 PROCEDURE — 82607 VITAMIN B-12: CPT | Performed by: INTERNAL MEDICINE

## 2023-01-01 PROCEDURE — 36415 COLL VENOUS BLD VENIPUNCTURE: CPT | Performed by: NURSE PRACTITIONER

## 2023-01-01 PROCEDURE — 87507 IADNA-DNA/RNA PROBE TQ 12-25: CPT | Performed by: INTERNAL MEDICINE

## 2023-01-01 PROCEDURE — 99222 1ST HOSP IP/OBS MODERATE 55: CPT | Performed by: SPECIALIST

## 2023-01-01 PROCEDURE — 84484 ASSAY OF TROPONIN QUANT: CPT | Performed by: SOCIAL WORKER

## 2023-01-01 PROCEDURE — 250N000011 HC RX IP 250 OP 636: Performed by: SOCIAL WORKER

## 2023-01-01 PROCEDURE — 70551 MRI BRAIN STEM W/O DYE: CPT

## 2023-01-01 PROCEDURE — 93005 ELECTROCARDIOGRAM TRACING: CPT

## 2023-01-01 PROCEDURE — 83605 ASSAY OF LACTIC ACID: CPT | Performed by: INTERNAL MEDICINE

## 2023-01-01 PROCEDURE — 99231 SBSQ HOSP IP/OBS SF/LOW 25: CPT | Performed by: INTERNAL MEDICINE

## 2023-01-01 PROCEDURE — 71046 X-RAY EXAM CHEST 2 VIEWS: CPT

## 2023-01-01 PROCEDURE — 99239 HOSP IP/OBS DSCHRG MGMT >30: CPT | Performed by: INTERNAL MEDICINE

## 2023-01-01 PROCEDURE — 82310 ASSAY OF CALCIUM: CPT | Performed by: INTERNAL MEDICINE

## 2023-01-01 PROCEDURE — 999N000040 HC STATISTIC CONSULT NO CHARGE VASC ACCESS

## 2023-01-01 PROCEDURE — 74177 CT ABD & PELVIS W/CONTRAST: CPT

## 2023-01-01 PROCEDURE — 250N000011 HC RX IP 250 OP 636: Mod: JZ | Performed by: EMERGENCY MEDICINE

## 2023-01-01 PROCEDURE — 99233 SBSQ HOSP IP/OBS HIGH 50: CPT | Performed by: INTERNAL MEDICINE

## 2023-01-01 PROCEDURE — 99214 OFFICE O/P EST MOD 30 MIN: CPT | Performed by: INTERNAL MEDICINE

## 2023-01-01 PROCEDURE — 87507 IADNA-DNA/RNA PROBE TQ 12-25: CPT | Performed by: EMERGENCY MEDICINE

## 2023-01-01 PROCEDURE — 82803 BLOOD GASES ANY COMBINATION: CPT

## 2023-01-01 PROCEDURE — 258N000003 HC RX IP 258 OP 636: Performed by: SOCIAL WORKER

## 2023-01-01 PROCEDURE — 81001 URINALYSIS AUTO W/SCOPE: CPT | Performed by: EMERGENCY MEDICINE

## 2023-01-01 PROCEDURE — 83735 ASSAY OF MAGNESIUM: CPT | Performed by: EMERGENCY MEDICINE

## 2023-01-01 PROCEDURE — 83690 ASSAY OF LIPASE: CPT | Performed by: INTERNAL MEDICINE

## 2023-01-01 PROCEDURE — 87324 CLOSTRIDIUM AG IA: CPT | Performed by: INTERNAL MEDICINE

## 2023-01-01 PROCEDURE — 85025 COMPLETE CBC W/AUTO DIFF WBC: CPT | Performed by: SOCIAL WORKER

## 2023-01-01 PROCEDURE — 99495 TRANSJ CARE MGMT MOD F2F 14D: CPT | Performed by: PHYSICIAN ASSISTANT

## 2023-01-01 PROCEDURE — 83690 ASSAY OF LIPASE: CPT | Performed by: EMERGENCY MEDICINE

## 2023-01-01 PROCEDURE — 91320 SARSCV2 VAC 30MCG TRS-SUC IM: CPT | Performed by: INTERNAL MEDICINE

## 2023-01-01 PROCEDURE — 87324 CLOSTRIDIUM AG IA: CPT | Mod: 59 | Performed by: INTERNAL MEDICINE

## 2023-01-01 PROCEDURE — 80053 COMPREHEN METABOLIC PANEL: CPT | Performed by: EMERGENCY MEDICINE

## 2023-01-01 PROCEDURE — 250N000009 HC RX 250: Performed by: SOCIAL WORKER

## 2023-01-01 PROCEDURE — 250N000009 HC RX 250: Performed by: EMERGENCY MEDICINE

## 2023-01-01 PROCEDURE — 87637 SARSCOV2&INF A&B&RSV AMP PRB: CPT | Performed by: EMERGENCY MEDICINE

## 2023-01-01 PROCEDURE — 255N000002 HC RX 255 OP 636: Performed by: INTERNAL MEDICINE

## 2023-01-01 PROCEDURE — 99214 OFFICE O/P EST MOD 30 MIN: CPT | Mod: 25 | Performed by: PHYSICIAN ASSISTANT

## 2023-01-01 PROCEDURE — 97116 GAIT TRAINING THERAPY: CPT | Mod: GP

## 2023-01-01 PROCEDURE — 87324 CLOSTRIDIUM AG IA: CPT | Performed by: EMERGENCY MEDICINE

## 2023-01-01 PROCEDURE — 85025 COMPLETE CBC W/AUTO DIFF WBC: CPT | Performed by: INTERNAL MEDICINE

## 2023-01-01 PROCEDURE — 84484 ASSAY OF TROPONIN QUANT: CPT | Performed by: EMERGENCY MEDICINE

## 2023-01-01 PROCEDURE — 97161 PT EVAL LOW COMPLEX 20 MIN: CPT | Mod: GP

## 2023-01-01 PROCEDURE — 0124A COVID-19 BIVALENT 12+ (PFIZER): CPT | Performed by: INTERNAL MEDICINE

## 2023-01-01 PROCEDURE — 91312 COVID-19 BIVALENT 12+ (PFIZER): CPT | Performed by: INTERNAL MEDICINE

## 2023-01-01 PROCEDURE — 82784 ASSAY IGA/IGD/IGG/IGM EACH: CPT | Performed by: NURSE PRACTITIONER

## 2023-01-01 PROCEDURE — 82728 ASSAY OF FERRITIN: CPT | Performed by: NURSE PRACTITIONER

## 2023-01-01 PROCEDURE — 258N000003 HC RX IP 258 OP 636: Performed by: EMERGENCY MEDICINE

## 2023-01-01 PROCEDURE — 93005 ELECTROCARDIOGRAM TRACING: CPT | Mod: 76

## 2023-01-01 PROCEDURE — 99232 SBSQ HOSP IP/OBS MODERATE 35: CPT | Performed by: PHYSICIAN ASSISTANT

## 2023-01-01 PROCEDURE — 97165 OT EVAL LOW COMPLEX 30 MIN: CPT | Mod: GO

## 2023-01-01 PROCEDURE — 83690 ASSAY OF LIPASE: CPT | Performed by: SOCIAL WORKER

## 2023-01-01 PROCEDURE — 96360 HYDRATION IV INFUSION INIT: CPT

## 2023-01-01 PROCEDURE — 87040 BLOOD CULTURE FOR BACTERIA: CPT | Performed by: EMERGENCY MEDICINE

## 2023-01-01 PROCEDURE — 36415 COLL VENOUS BLD VENIPUNCTURE: CPT | Performed by: SOCIAL WORKER

## 2023-01-01 PROCEDURE — 84484 ASSAY OF TROPONIN QUANT: CPT | Performed by: INTERNAL MEDICINE

## 2023-01-01 PROCEDURE — 96372 THER/PROPH/DIAG INJ SC/IM: CPT | Performed by: NURSE PRACTITIONER

## 2023-01-01 PROCEDURE — 99607 MTMS BY PHARM ADDL 15 MIN: CPT | Performed by: PHARMACIST

## 2023-01-01 PROCEDURE — 80053 COMPREHEN METABOLIC PANEL: CPT | Performed by: SOCIAL WORKER

## 2023-01-01 PROCEDURE — 85025 COMPLETE CBC W/AUTO DIFF WBC: CPT | Performed by: PHYSICIAN ASSISTANT

## 2023-01-01 PROCEDURE — 87040 BLOOD CULTURE FOR BACTERIA: CPT | Performed by: SOCIAL WORKER

## 2023-01-01 PROCEDURE — 93306 TTE W/DOPPLER COMPLETE: CPT | Mod: 26 | Performed by: INTERNAL MEDICINE

## 2023-01-01 PROCEDURE — 250N000013 HC RX MED GY IP 250 OP 250 PS 637: Performed by: SOCIAL WORKER

## 2023-01-01 PROCEDURE — 71250 CT THORAX DX C-: CPT

## 2023-01-01 PROCEDURE — 83550 IRON BINDING TEST: CPT | Performed by: NURSE PRACTITIONER

## 2023-01-01 RX ORDER — TAMSULOSIN HYDROCHLORIDE 0.4 MG/1
0.4 CAPSULE ORAL DAILY
Status: DISCONTINUED | OUTPATIENT
Start: 2023-01-01 | End: 2023-01-01 | Stop reason: HOSPADM

## 2023-01-01 RX ORDER — ACETAMINOPHEN 325 MG/1
650 TABLET ORAL EVERY 6 HOURS PRN
Status: DISCONTINUED | OUTPATIENT
Start: 2023-01-01 | End: 2023-01-01 | Stop reason: HOSPADM

## 2023-01-01 RX ORDER — ONDANSETRON 4 MG/1
4 TABLET, ORALLY DISINTEGRATING ORAL EVERY 6 HOURS PRN
Status: DISCONTINUED | OUTPATIENT
Start: 2023-01-01 | End: 2023-01-01 | Stop reason: HOSPADM

## 2023-01-01 RX ORDER — ALBUTEROL SULFATE 0.83 MG/ML
2.5 SOLUTION RESPIRATORY (INHALATION)
Status: DISCONTINUED | OUTPATIENT
Start: 2023-01-01 | End: 2023-01-01 | Stop reason: HOSPADM

## 2023-01-01 RX ORDER — NALOXONE HYDROCHLORIDE 0.4 MG/ML
0.4 INJECTION, SOLUTION INTRAMUSCULAR; INTRAVENOUS; SUBCUTANEOUS
Status: DISCONTINUED | OUTPATIENT
Start: 2023-01-01 | End: 2023-01-01 | Stop reason: HOSPADM

## 2023-01-01 RX ORDER — FLUDROCORTISONE ACETATE 0.1 MG/1
0.1 TABLET ORAL DAILY
Status: DISCONTINUED | OUTPATIENT
Start: 2023-01-01 | End: 2023-01-01 | Stop reason: HOSPADM

## 2023-01-01 RX ORDER — ONDANSETRON 2 MG/ML
4 INJECTION INTRAMUSCULAR; INTRAVENOUS EVERY 6 HOURS PRN
Status: DISCONTINUED | OUTPATIENT
Start: 2023-01-01 | End: 2023-01-01 | Stop reason: HOSPADM

## 2023-01-01 RX ORDER — FAMOTIDINE 20 MG/1
40 TABLET, FILM COATED ORAL DAILY
Status: DISCONTINUED | OUTPATIENT
Start: 2023-01-01 | End: 2023-01-01 | Stop reason: HOSPADM

## 2023-01-01 RX ORDER — VANCOMYCIN HYDROCHLORIDE 125 MG/1
125 CAPSULE ORAL 4 TIMES DAILY
Status: DISCONTINUED | OUTPATIENT
Start: 2023-01-01 | End: 2023-01-01

## 2023-01-01 RX ORDER — CEFTRIAXONE SODIUM 1 G
1 VIAL (EA) INJECTION ONCE
Status: COMPLETED | OUTPATIENT
Start: 2023-01-01 | End: 2023-01-01

## 2023-01-01 RX ORDER — SODIUM CHLORIDE 9 MG/ML
INJECTION, SOLUTION INTRAVENOUS CONTINUOUS
Status: DISCONTINUED | OUTPATIENT
Start: 2023-01-01 | End: 2023-01-01

## 2023-01-01 RX ORDER — SULFAMETHOXAZOLE/TRIMETHOPRIM 800-160 MG
1 TABLET ORAL 2 TIMES DAILY
Qty: 14 TABLET | Refills: 0 | Status: SHIPPED | OUTPATIENT
Start: 2023-01-01 | End: 2023-01-01

## 2023-01-01 RX ORDER — METOPROLOL TARTRATE 1 MG/ML
2.5 INJECTION, SOLUTION INTRAVENOUS EVERY 4 HOURS PRN
Status: DISCONTINUED | OUTPATIENT
Start: 2023-01-01 | End: 2023-01-01 | Stop reason: HOSPADM

## 2023-01-01 RX ORDER — IOPAMIDOL 755 MG/ML
74 INJECTION, SOLUTION INTRAVASCULAR ONCE
Status: COMPLETED | OUTPATIENT
Start: 2023-01-01 | End: 2023-01-01

## 2023-01-01 RX ORDER — POTASSIUM CHLORIDE 750 MG/1
40 TABLET, EXTENDED RELEASE ORAL ONCE
Status: COMPLETED | OUTPATIENT
Start: 2023-01-01 | End: 2023-01-01

## 2023-01-01 RX ORDER — PREDNISONE 20 MG/1
20 TABLET ORAL DAILY
Qty: 5 TABLET | Refills: 0 | Status: ON HOLD | OUTPATIENT
Start: 2023-01-01 | End: 2024-01-01

## 2023-01-01 RX ORDER — PROCHLORPERAZINE 25 MG
12.5 SUPPOSITORY, RECTAL RECTAL EVERY 12 HOURS PRN
Status: DISCONTINUED | OUTPATIENT
Start: 2023-01-01 | End: 2023-01-01 | Stop reason: HOSPADM

## 2023-01-01 RX ORDER — VANCOMYCIN HYDROCHLORIDE 125 MG/1
CAPSULE ORAL
Qty: 220 CAPSULE | Refills: 0 | Status: ON HOLD | OUTPATIENT
Start: 2023-01-01 | End: 2023-01-01

## 2023-01-01 RX ORDER — ALBUTEROL SULFATE 0.83 MG/ML
2.5 SOLUTION RESPIRATORY (INHALATION) EVERY 6 HOURS PRN
Status: DISCONTINUED | OUTPATIENT
Start: 2023-01-01 | End: 2023-01-01

## 2023-01-01 RX ORDER — VANCOMYCIN HYDROCHLORIDE 125 MG/1
250 CAPSULE ORAL 4 TIMES DAILY
Status: DISCONTINUED | OUTPATIENT
Start: 2023-01-01 | End: 2023-01-01 | Stop reason: HOSPADM

## 2023-01-01 RX ORDER — CALCIUM CARBONATE 500 MG/1
1000 TABLET, CHEWABLE ORAL 4 TIMES DAILY PRN
Status: DISCONTINUED | OUTPATIENT
Start: 2023-01-01 | End: 2023-01-01 | Stop reason: HOSPADM

## 2023-01-01 RX ORDER — TAMSULOSIN HYDROCHLORIDE 0.4 MG/1
0.4 CAPSULE ORAL DAILY
Start: 2023-01-01

## 2023-01-01 RX ORDER — DOCOSANOL 100 MG/G
CREAM TOPICAL
Status: DISCONTINUED | OUTPATIENT
Start: 2023-01-01 | End: 2023-01-01 | Stop reason: HOSPADM

## 2023-01-01 RX ORDER — AZITHROMYCIN 500 MG/1
500 INJECTION, POWDER, LYOPHILIZED, FOR SOLUTION INTRAVENOUS ONCE
Status: COMPLETED | OUTPATIENT
Start: 2023-01-01 | End: 2023-01-01

## 2023-01-01 RX ORDER — VANCOMYCIN HYDROCHLORIDE 125 MG/1
125 CAPSULE ORAL 4 TIMES DAILY
Qty: 40 CAPSULE | Refills: 0 | Status: ON HOLD | OUTPATIENT
Start: 2023-01-01 | End: 2023-01-01

## 2023-01-01 RX ORDER — FAMOTIDINE 20 MG/1
20 TABLET, FILM COATED ORAL DAILY
Status: DISCONTINUED | OUTPATIENT
Start: 2023-01-01 | End: 2023-01-01 | Stop reason: HOSPADM

## 2023-01-01 RX ORDER — VANCOMYCIN HYDROCHLORIDE 125 MG/1
125 CAPSULE ORAL 4 TIMES DAILY
Qty: 40 CAPSULE | Refills: 0 | Status: SHIPPED | OUTPATIENT
Start: 2023-01-01 | End: 2023-01-01

## 2023-01-01 RX ORDER — DULOXETIN HYDROCHLORIDE 60 MG/1
60 CAPSULE, DELAYED RELEASE ORAL DAILY
COMMUNITY
End: 2023-01-01

## 2023-01-01 RX ORDER — VANCOMYCIN HYDROCHLORIDE 125 MG/1
CAPSULE ORAL
Qty: 73 CAPSULE | Refills: 0 | Status: SHIPPED | OUTPATIENT
Start: 2023-01-01 | End: 2023-01-01

## 2023-01-01 RX ORDER — IOPAMIDOL 755 MG/ML
75 INJECTION, SOLUTION INTRAVASCULAR ONCE
Status: COMPLETED | OUTPATIENT
Start: 2023-01-01 | End: 2023-01-01

## 2023-01-01 RX ORDER — POTASSIUM CHLORIDE 750 MG/1
10 TABLET, EXTENDED RELEASE ORAL 2 TIMES DAILY
Qty: 14 TABLET | Refills: 0 | Status: SHIPPED | OUTPATIENT
Start: 2023-01-01 | End: 2023-01-01

## 2023-01-01 RX ORDER — HYDROXYZINE HYDROCHLORIDE 25 MG/1
50 TABLET, FILM COATED ORAL 2 TIMES DAILY PRN
Status: DISCONTINUED | OUTPATIENT
Start: 2023-01-01 | End: 2023-01-01 | Stop reason: HOSPADM

## 2023-01-01 RX ORDER — BISACODYL 10 MG
10 SUPPOSITORY, RECTAL RECTAL DAILY PRN
Status: DISCONTINUED | OUTPATIENT
Start: 2023-01-01 | End: 2023-01-01 | Stop reason: HOSPADM

## 2023-01-01 RX ORDER — HEPARIN SODIUM 10000 [USP'U]/100ML
0-5000 INJECTION, SOLUTION INTRAVENOUS CONTINUOUS
Status: DISPENSED | OUTPATIENT
Start: 2023-01-01 | End: 2023-01-01

## 2023-01-01 RX ORDER — ALBUTEROL SULFATE 0.83 MG/ML
2.5 SOLUTION RESPIRATORY (INHALATION) EVERY 6 HOURS PRN
Status: DISCONTINUED | OUTPATIENT
Start: 2023-01-01 | End: 2023-01-01 | Stop reason: HOSPADM

## 2023-01-01 RX ORDER — FAMOTIDINE 20 MG/1
40 TABLET, FILM COATED ORAL 2 TIMES DAILY
Status: DISCONTINUED | OUTPATIENT
Start: 2023-01-01 | End: 2023-01-01 | Stop reason: DRUGHIGH

## 2023-01-01 RX ORDER — AMOXICILLIN 250 MG
2 CAPSULE ORAL 2 TIMES DAILY PRN
Status: DISCONTINUED | OUTPATIENT
Start: 2023-01-01 | End: 2023-01-01 | Stop reason: HOSPADM

## 2023-01-01 RX ORDER — NALOXONE HYDROCHLORIDE 0.4 MG/ML
0.2 INJECTION, SOLUTION INTRAMUSCULAR; INTRAVENOUS; SUBCUTANEOUS
Status: DISCONTINUED | OUTPATIENT
Start: 2023-01-01 | End: 2023-01-01 | Stop reason: HOSPADM

## 2023-01-01 RX ORDER — CALCIUM CARBONATE 500 MG/1
500 TABLET, CHEWABLE ORAL 3 TIMES DAILY PRN
Status: DISCONTINUED | OUTPATIENT
Start: 2023-01-01 | End: 2023-01-01 | Stop reason: HOSPADM

## 2023-01-01 RX ORDER — FERROUS SULFATE 325(65) MG
325 TABLET ORAL 2 TIMES DAILY
Status: DISCONTINUED | OUTPATIENT
Start: 2023-01-01 | End: 2023-01-01 | Stop reason: HOSPADM

## 2023-01-01 RX ORDER — LIDOCAINE 40 MG/G
CREAM TOPICAL
Status: DISCONTINUED | OUTPATIENT
Start: 2023-01-01 | End: 2023-01-01 | Stop reason: HOSPADM

## 2023-01-01 RX ORDER — OXYCODONE HYDROCHLORIDE 5 MG/1
5 TABLET ORAL EVERY 4 HOURS PRN
Status: DISCONTINUED | OUTPATIENT
Start: 2023-01-01 | End: 2023-01-01 | Stop reason: HOSPADM

## 2023-01-01 RX ORDER — HYDROMORPHONE HCL IN WATER/PF 6 MG/30 ML
0.2 PATIENT CONTROLLED ANALGESIA SYRINGE INTRAVENOUS
Status: DISCONTINUED | OUTPATIENT
Start: 2023-01-01 | End: 2023-01-01 | Stop reason: HOSPADM

## 2023-01-01 RX ORDER — AMOXICILLIN 250 MG
1 CAPSULE ORAL 2 TIMES DAILY PRN
Status: DISCONTINUED | OUTPATIENT
Start: 2023-01-01 | End: 2023-01-01 | Stop reason: HOSPADM

## 2023-01-01 RX ORDER — DULOXETIN HYDROCHLORIDE 30 MG/1
60 CAPSULE, DELAYED RELEASE ORAL DAILY
Status: DISCONTINUED | OUTPATIENT
Start: 2023-01-01 | End: 2023-01-01 | Stop reason: HOSPADM

## 2023-01-01 RX ORDER — GABAPENTIN 300 MG/1
900 CAPSULE ORAL AT BEDTIME
Status: DISCONTINUED | OUTPATIENT
Start: 2023-01-01 | End: 2023-01-01 | Stop reason: HOSPADM

## 2023-01-01 RX ORDER — POLYETHYLENE GLYCOL 3350 17 G/17G
17 POWDER, FOR SOLUTION ORAL 2 TIMES DAILY PRN
Status: DISCONTINUED | OUTPATIENT
Start: 2023-01-01 | End: 2023-01-01 | Stop reason: HOSPADM

## 2023-01-01 RX ORDER — ATORVASTATIN CALCIUM 10 MG/1
10 TABLET, FILM COATED ORAL DAILY
Status: DISCONTINUED | OUTPATIENT
Start: 2023-01-01 | End: 2023-01-01 | Stop reason: HOSPADM

## 2023-01-01 RX ORDER — ACETAMINOPHEN 325 MG/1
650 TABLET ORAL EVERY 4 HOURS PRN
Status: DISCONTINUED | OUTPATIENT
Start: 2023-01-01 | End: 2023-01-01 | Stop reason: HOSPADM

## 2023-01-01 RX ORDER — POTASSIUM CHLORIDE 750 MG/1
10 TABLET, EXTENDED RELEASE ORAL 2 TIMES DAILY
Status: DISCONTINUED | OUTPATIENT
Start: 2023-01-01 | End: 2023-01-01 | Stop reason: HOSPADM

## 2023-01-01 RX ORDER — FAMOTIDINE 40 MG/1
40 TABLET, FILM COATED ORAL DAILY
Start: 2023-01-01 | End: 2023-01-01

## 2023-01-01 RX ORDER — PROCHLORPERAZINE MALEATE 5 MG
5 TABLET ORAL EVERY 6 HOURS PRN
Status: DISCONTINUED | OUTPATIENT
Start: 2023-01-01 | End: 2023-01-01 | Stop reason: HOSPADM

## 2023-01-01 RX ORDER — CEFEPIME HYDROCHLORIDE 2 G/1
2 INJECTION, POWDER, FOR SOLUTION INTRAVENOUS ONCE
Status: COMPLETED | OUTPATIENT
Start: 2023-01-01 | End: 2023-01-01

## 2023-01-01 RX ORDER — HEPARIN SODIUM 10000 [USP'U]/100ML
0-5000 INJECTION, SOLUTION INTRAVENOUS CONTINUOUS
Status: DISCONTINUED | OUTPATIENT
Start: 2023-01-01 | End: 2023-01-01

## 2023-01-01 RX ORDER — GABAPENTIN 300 MG/1
600 CAPSULE ORAL AT BEDTIME
Status: DISCONTINUED | OUTPATIENT
Start: 2023-01-01 | End: 2023-01-01 | Stop reason: HOSPADM

## 2023-01-01 RX ORDER — VANCOMYCIN HYDROCHLORIDE 125 MG/1
125 CAPSULE ORAL 4 TIMES DAILY
Status: DISCONTINUED | OUTPATIENT
Start: 2023-01-01 | End: 2023-01-01 | Stop reason: HOSPADM

## 2023-01-01 RX ORDER — PIPERACILLIN SODIUM, TAZOBACTAM SODIUM 4; .5 G/20ML; G/20ML
4.5 INJECTION, POWDER, LYOPHILIZED, FOR SOLUTION INTRAVENOUS ONCE
Status: DISCONTINUED | OUTPATIENT
Start: 2023-01-01 | End: 2023-01-01

## 2023-01-01 RX ORDER — FAMOTIDINE 40 MG/1
40 TABLET, FILM COATED ORAL 2 TIMES DAILY
Qty: 180 TABLET | Refills: 0 | Status: ON HOLD | OUTPATIENT
Start: 2023-01-01 | End: 2024-01-01

## 2023-01-01 RX ORDER — VANCOMYCIN HYDROCHLORIDE 125 MG/1
125 CAPSULE ORAL ONCE
Qty: 1 CAPSULE | Refills: 0 | Status: COMPLETED | OUTPATIENT
Start: 2023-01-01 | End: 2023-01-01

## 2023-01-01 RX ORDER — FLUTICASONE FUROATE AND VILANTEROL 200; 25 UG/1; UG/1
1 POWDER RESPIRATORY (INHALATION) DAILY
Status: DISCONTINUED | OUTPATIENT
Start: 2023-01-01 | End: 2023-01-01 | Stop reason: HOSPADM

## 2023-01-01 RX ORDER — TAMSULOSIN HYDROCHLORIDE 0.4 MG/1
0.4 CAPSULE ORAL DAILY
COMMUNITY
End: 2023-01-01

## 2023-01-01 RX ORDER — VANCOMYCIN HYDROCHLORIDE 50 MG/ML
125 KIT ORAL 4 TIMES DAILY
Status: DISCONTINUED | OUTPATIENT
Start: 2023-01-01 | End: 2023-01-01 | Stop reason: HOSPADM

## 2023-01-01 RX ORDER — GABAPENTIN 300 MG/1
600 CAPSULE ORAL AT BEDTIME
Status: ON HOLD
Start: 2023-01-01 | End: 2024-01-01

## 2023-01-01 RX ORDER — FAMOTIDINE 20 MG/1
40 TABLET, FILM COATED ORAL 2 TIMES DAILY
Status: DISCONTINUED | OUTPATIENT
Start: 2023-01-01 | End: 2023-01-01 | Stop reason: HOSPADM

## 2023-01-01 RX ORDER — METRONIDAZOLE 500 MG/100ML
500 INJECTION, SOLUTION INTRAVENOUS ONCE
Status: COMPLETED | OUTPATIENT
Start: 2023-01-01 | End: 2023-01-01

## 2023-01-01 RX ORDER — FLUDROCORTISONE ACETATE 0.1 MG/1
0.1 TABLET ORAL DAILY
Qty: 90 TABLET | Refills: 3 | Status: ON HOLD | OUTPATIENT
Start: 2023-01-01 | End: 2024-01-01

## 2023-01-01 RX ORDER — VANCOMYCIN HYDROCHLORIDE 125 MG/1
125 CAPSULE ORAL 4 TIMES DAILY
Qty: 120 CAPSULE | Refills: 1 | Status: ON HOLD | OUTPATIENT
Start: 2023-01-01 | End: 2024-01-01

## 2023-01-01 RX ORDER — DULOXETIN HYDROCHLORIDE 60 MG/1
60 CAPSULE, DELAYED RELEASE ORAL DAILY
Status: DISCONTINUED | OUTPATIENT
Start: 2023-01-01 | End: 2023-01-01 | Stop reason: HOSPADM

## 2023-01-01 RX ORDER — HYDROMORPHONE HYDROCHLORIDE 1 MG/ML
0.3 INJECTION, SOLUTION INTRAMUSCULAR; INTRAVENOUS; SUBCUTANEOUS
Status: DISCONTINUED | OUTPATIENT
Start: 2023-01-01 | End: 2023-01-01 | Stop reason: HOSPADM

## 2023-01-01 RX ORDER — VANCOMYCIN HYDROCHLORIDE 125 MG/1
125 CAPSULE ORAL ONCE
Status: COMPLETED | OUTPATIENT
Start: 2023-01-01 | End: 2023-01-01

## 2023-01-01 RX ORDER — ACETAMINOPHEN 650 MG/1
650 SUPPOSITORY RECTAL EVERY 6 HOURS PRN
Status: DISCONTINUED | OUTPATIENT
Start: 2023-01-01 | End: 2023-01-01 | Stop reason: HOSPADM

## 2023-01-01 RX ORDER — ACETAMINOPHEN 500 MG
1000 TABLET ORAL 3 TIMES DAILY PRN
Status: DISCONTINUED | OUTPATIENT
Start: 2023-01-01 | End: 2023-01-01 | Stop reason: HOSPADM

## 2023-01-01 RX ORDER — ALBUTEROL SULFATE 90 UG/1
2 AEROSOL, METERED RESPIRATORY (INHALATION) EVERY 6 HOURS PRN
Status: DISCONTINUED | OUTPATIENT
Start: 2023-01-01 | End: 2023-01-01 | Stop reason: HOSPADM

## 2023-01-01 RX ORDER — ATORVASTATIN CALCIUM 10 MG/1
10 TABLET, FILM COATED ORAL DAILY
Qty: 90 TABLET | Refills: 0 | Status: ON HOLD | OUTPATIENT
Start: 2023-01-01 | End: 2024-01-01

## 2023-01-01 RX ORDER — METRONIDAZOLE 500 MG/100ML
500 INJECTION, SOLUTION INTRAVENOUS EVERY 8 HOURS
Status: DISCONTINUED | OUTPATIENT
Start: 2023-01-01 | End: 2023-01-01

## 2023-01-01 RX ORDER — ACETAMINOPHEN 650 MG/1
650 SUPPOSITORY RECTAL EVERY 4 HOURS PRN
Status: DISCONTINUED | OUTPATIENT
Start: 2023-01-01 | End: 2023-01-01 | Stop reason: HOSPADM

## 2023-01-01 RX ORDER — ACETAMINOPHEN 500 MG
1000 TABLET ORAL ONCE
Status: COMPLETED | OUTPATIENT
Start: 2023-01-01 | End: 2023-01-01

## 2023-01-01 RX ORDER — SODIUM CHLORIDE 9 MG/ML
INJECTION, SOLUTION INTRAVENOUS CONTINUOUS
Status: DISCONTINUED | OUTPATIENT
Start: 2023-01-01 | End: 2023-01-01 | Stop reason: HOSPADM

## 2023-01-01 RX ORDER — CEFEPIME HYDROCHLORIDE 2 G/1
2 INJECTION, POWDER, FOR SOLUTION INTRAVENOUS EVERY 12 HOURS
Qty: 175 ML | Refills: 0 | Status: DISCONTINUED | OUTPATIENT
Start: 2023-01-01 | End: 2023-01-01

## 2023-01-01 RX ADMIN — VANCOMYCIN HYDROCHLORIDE 125 MG: KIT at 12:21

## 2023-01-01 RX ADMIN — SODIUM CHLORIDE 62 ML: 9 INJECTION, SOLUTION INTRAVENOUS at 20:31

## 2023-01-01 RX ADMIN — VANCOMYCIN HYDROCHLORIDE 125 MG: KIT at 19:05

## 2023-01-01 RX ADMIN — VANCOMYCIN HYDROCHLORIDE 125 MG: KIT at 21:21

## 2023-01-01 RX ADMIN — ATORVASTATIN CALCIUM 10 MG: 10 TABLET, FILM COATED ORAL at 09:55

## 2023-01-01 RX ADMIN — SODIUM CHLORIDE 1000 ML: 9 INJECTION, SOLUTION INTRAVENOUS at 17:22

## 2023-01-01 RX ADMIN — METRONIDAZOLE 500 MG: 500 INJECTION, SOLUTION INTRAVENOUS at 09:07

## 2023-01-01 RX ADMIN — DULOXETINE HYDROCHLORIDE 60 MG: 60 CAPSULE, DELAYED RELEASE ORAL at 09:23

## 2023-01-01 RX ADMIN — VANCOMYCIN HYDROCHLORIDE 250 MG: 125 CAPSULE ORAL at 09:23

## 2023-01-01 RX ADMIN — ATORVASTATIN CALCIUM 10 MG: 10 TABLET, FILM COATED ORAL at 09:21

## 2023-01-01 RX ADMIN — VANCOMYCIN HYDROCHLORIDE 250 MG: 125 CAPSULE ORAL at 17:01

## 2023-01-01 RX ADMIN — VANCOMYCIN HYDROCHLORIDE 125 MG: KIT at 09:00

## 2023-01-01 RX ADMIN — VANCOMYCIN HYDROCHLORIDE 125 MG: 125 CAPSULE ORAL at 21:46

## 2023-01-01 RX ADMIN — UMECLIDINIUM 1 PUFF: 62.5 AEROSOL, POWDER ORAL at 09:15

## 2023-01-01 RX ADMIN — FERROUS SULFATE TAB 325 MG (65 MG ELEMENTAL FE) 325 MG: 325 (65 FE) TAB at 09:08

## 2023-01-01 RX ADMIN — FLUTICASONE FUROATE AND VILANTEROL TRIFENATATE 1 PUFF: 200; 25 POWDER RESPIRATORY (INHALATION) at 09:04

## 2023-01-01 RX ADMIN — SODIUM CHLORIDE: 9 INJECTION, SOLUTION INTRAVENOUS at 14:36

## 2023-01-01 RX ADMIN — VANCOMYCIN HYDROCHLORIDE 125 MG: KIT at 12:57

## 2023-01-01 RX ADMIN — ATORVASTATIN CALCIUM 10 MG: 10 TABLET, FILM COATED ORAL at 09:28

## 2023-01-01 RX ADMIN — UMECLIDINIUM 1 PUFF: 62.5 AEROSOL, POWDER ORAL at 08:48

## 2023-01-01 RX ADMIN — APIXABAN 10 MG: 5 TABLET, FILM COATED ORAL at 08:44

## 2023-01-01 RX ADMIN — Medication 1 G: at 15:58

## 2023-01-01 RX ADMIN — ATORVASTATIN CALCIUM 10 MG: 10 TABLET, FILM COATED ORAL at 09:07

## 2023-01-01 RX ADMIN — APIXABAN 10 MG: 5 TABLET, FILM COATED ORAL at 19:04

## 2023-01-01 RX ADMIN — VANCOMYCIN HYDROCHLORIDE 125 MG: KIT at 14:38

## 2023-01-01 RX ADMIN — DULOXETINE HYDROCHLORIDE 60 MG: 30 CAPSULE, DELAYED RELEASE ORAL at 09:14

## 2023-01-01 RX ADMIN — UMECLIDINIUM 1 PUFF: 62.5 AEROSOL, POWDER ORAL at 17:01

## 2023-01-01 RX ADMIN — VANCOMYCIN HYDROCHLORIDE 125 MG: 125 CAPSULE ORAL at 17:26

## 2023-01-01 RX ADMIN — METRONIDAZOLE 500 MG: 500 INJECTION, SOLUTION INTRAVENOUS at 01:56

## 2023-01-01 RX ADMIN — VANCOMYCIN HYDROCHLORIDE 125 MG: 125 CAPSULE ORAL at 13:28

## 2023-01-01 RX ADMIN — VANCOMYCIN HYDROCHLORIDE 125 MG: 125 CAPSULE ORAL at 18:15

## 2023-01-01 RX ADMIN — APIXABAN 10 MG: 5 TABLET, FILM COATED ORAL at 21:09

## 2023-01-01 RX ADMIN — VANCOMYCIN HYDROCHLORIDE 125 MG: 125 CAPSULE ORAL at 09:08

## 2023-01-01 RX ADMIN — FERROUS SULFATE TAB 325 MG (65 MG ELEMENTAL FE) 325 MG: 325 (65 FE) TAB at 20:45

## 2023-01-01 RX ADMIN — SODIUM CHLORIDE, POTASSIUM CHLORIDE, SODIUM LACTATE AND CALCIUM CHLORIDE 1000 ML: 600; 310; 30; 20 INJECTION, SOLUTION INTRAVENOUS at 00:07

## 2023-01-01 RX ADMIN — FAMOTIDINE 40 MG: 20 TABLET ORAL at 12:22

## 2023-01-01 RX ADMIN — FERROUS SULFATE TAB 325 MG (65 MG ELEMENTAL FE) 325 MG: 325 (65 FE) TAB at 09:21

## 2023-01-01 RX ADMIN — FLUTICASONE FUROATE AND VILANTEROL TRIFENATATE 1 PUFF: 200; 25 POWDER RESPIRATORY (INHALATION) at 09:15

## 2023-01-01 RX ADMIN — UMECLIDINIUM 1 PUFF: 62.5 AEROSOL, POWDER ORAL at 08:26

## 2023-01-01 RX ADMIN — FAMOTIDINE 40 MG: 20 TABLET ORAL at 08:44

## 2023-01-01 RX ADMIN — FAMOTIDINE 40 MG: 20 TABLET ORAL at 21:09

## 2023-01-01 RX ADMIN — VANCOMYCIN HYDROCHLORIDE 125 MG: KIT at 16:50

## 2023-01-01 RX ADMIN — AZITHROMYCIN MONOHYDRATE 250 MG: 500 INJECTION, POWDER, LYOPHILIZED, FOR SOLUTION INTRAVENOUS at 04:23

## 2023-01-01 RX ADMIN — FERROUS SULFATE TAB 325 MG (65 MG ELEMENTAL FE) 325 MG: 325 (65 FE) TAB at 09:15

## 2023-01-01 RX ADMIN — TAMSULOSIN HYDROCHLORIDE 0.4 MG: 0.4 CAPSULE ORAL at 09:07

## 2023-01-01 RX ADMIN — FLUTICASONE FUROATE AND VILANTEROL TRIFENATATE 1 PUFF: 200; 25 POWDER RESPIRATORY (INHALATION) at 09:25

## 2023-01-01 RX ADMIN — TAMSULOSIN HYDROCHLORIDE 0.4 MG: 0.4 CAPSULE ORAL at 08:42

## 2023-01-01 RX ADMIN — FLUTICASONE FUROATE AND VILANTEROL TRIFENATATE 1 PUFF: 200; 25 POWDER RESPIRATORY (INHALATION) at 09:59

## 2023-01-01 RX ADMIN — FAMOTIDINE 40 MG: 20 TABLET ORAL at 09:26

## 2023-01-01 RX ADMIN — TAMSULOSIN HYDROCHLORIDE 0.4 MG: 0.4 CAPSULE ORAL at 17:03

## 2023-01-01 RX ADMIN — METOPROLOL TARTRATE 2.5 MG: 5 INJECTION INTRAVENOUS at 05:16

## 2023-01-01 RX ADMIN — VANCOMYCIN HYDROCHLORIDE 125 MG: 125 CAPSULE ORAL at 22:42

## 2023-01-01 RX ADMIN — FAMOTIDINE 20 MG: 20 TABLET ORAL at 08:42

## 2023-01-01 RX ADMIN — VANCOMYCIN HYDROCHLORIDE 125 MG: 125 CAPSULE ORAL at 13:34

## 2023-01-01 RX ADMIN — HEPARIN SODIUM 1750 UNITS/HR: 10000 INJECTION, SOLUTION INTRAVENOUS at 14:33

## 2023-01-01 RX ADMIN — METRONIDAZOLE 500 MG: 500 INJECTION, SOLUTION INTRAVENOUS at 18:17

## 2023-01-01 RX ADMIN — POTASSIUM CHLORIDE 40 MEQ: 750 TABLET, EXTENDED RELEASE ORAL at 08:55

## 2023-01-01 RX ADMIN — FLUDROCORTISONE ACETATE 0.1 MG: 0.1 TABLET ORAL at 08:56

## 2023-01-01 RX ADMIN — TAMSULOSIN HYDROCHLORIDE 0.4 MG: 0.4 CAPSULE ORAL at 08:20

## 2023-01-01 RX ADMIN — CEFEPIME HYDROCHLORIDE 2 G: 2 INJECTION, POWDER, FOR SOLUTION INTRAVENOUS at 03:42

## 2023-01-01 RX ADMIN — TAMSULOSIN HYDROCHLORIDE 0.4 MG: 0.4 CAPSULE ORAL at 09:26

## 2023-01-01 RX ADMIN — VANCOMYCIN HYDROCHLORIDE 125 MG: 125 CAPSULE ORAL at 12:28

## 2023-01-01 RX ADMIN — SODIUM CHLORIDE: 9 INJECTION, SOLUTION INTRAVENOUS at 04:20

## 2023-01-01 RX ADMIN — FAMOTIDINE 40 MG: 20 TABLET ORAL at 21:39

## 2023-01-01 RX ADMIN — FLUTICASONE FUROATE AND VILANTEROL TRIFENATATE 1 PUFF: 200; 25 POWDER RESPIRATORY (INHALATION) at 08:49

## 2023-01-01 RX ADMIN — APIXABAN 10 MG: 5 TABLET, FILM COATED ORAL at 09:26

## 2023-01-01 RX ADMIN — SODIUM CHLORIDE 1000 ML: 9 INJECTION, SOLUTION INTRAVENOUS at 16:39

## 2023-01-01 RX ADMIN — VANCOMYCIN HYDROCHLORIDE 125 MG: KIT at 12:51

## 2023-01-01 RX ADMIN — METRONIDAZOLE 500 MG: 500 INJECTION, SOLUTION INTRAVENOUS at 17:40

## 2023-01-01 RX ADMIN — VANCOMYCIN HYDROCHLORIDE 125 MG: KIT at 09:52

## 2023-01-01 RX ADMIN — VANCOMYCIN HYDROCHLORIDE 250 MG: 125 CAPSULE ORAL at 21:43

## 2023-01-01 RX ADMIN — METRONIDAZOLE 500 MG: 500 INJECTION, SOLUTION INTRAVENOUS at 03:25

## 2023-01-01 RX ADMIN — FERROUS SULFATE TAB 325 MG (65 MG ELEMENTAL FE) 325 MG: 325 (65 FE) TAB at 08:20

## 2023-01-01 RX ADMIN — METRONIDAZOLE 500 MG: 500 INJECTION, SOLUTION INTRAVENOUS at 02:50

## 2023-01-01 RX ADMIN — VANCOMYCIN HYDROCHLORIDE 125 MG: 125 CAPSULE ORAL at 05:05

## 2023-01-01 RX ADMIN — DULOXETINE HYDROCHLORIDE 60 MG: 30 CAPSULE, DELAYED RELEASE ORAL at 09:07

## 2023-01-01 RX ADMIN — ATORVASTATIN CALCIUM 10 MG: 10 TABLET, FILM COATED ORAL at 09:15

## 2023-01-01 RX ADMIN — ATORVASTATIN CALCIUM 10 MG: 10 TABLET, FILM COATED ORAL at 08:42

## 2023-01-01 RX ADMIN — FAMOTIDINE 40 MG: 20 TABLET, FILM COATED ORAL at 09:21

## 2023-01-01 RX ADMIN — POTASSIUM CHLORIDE 40 MEQ: 750 TABLET, EXTENDED RELEASE ORAL at 06:59

## 2023-01-01 RX ADMIN — ACETAMINOPHEN 650 MG: 325 TABLET, FILM COATED ORAL at 12:21

## 2023-01-01 RX ADMIN — ATORVASTATIN CALCIUM 10 MG: 10 TABLET, FILM COATED ORAL at 08:25

## 2023-01-01 RX ADMIN — FERROUS SULFATE TAB 325 MG (65 MG ELEMENTAL FE) 325 MG: 325 (65 FE) TAB at 21:43

## 2023-01-01 RX ADMIN — FLUDROCORTISONE ACETATE 0.1 MG: 0.1 TABLET ORAL at 08:25

## 2023-01-01 RX ADMIN — AZITHROMYCIN MONOHYDRATE 500 MG: 500 INJECTION, POWDER, LYOPHILIZED, FOR SOLUTION INTRAVENOUS at 03:45

## 2023-01-01 RX ADMIN — FLUDROCORTISONE ACETATE 0.1 MG: 0.1 TABLET ORAL at 09:55

## 2023-01-01 RX ADMIN — APIXABAN 10 MG: 5 TABLET, FILM COATED ORAL at 08:56

## 2023-01-01 RX ADMIN — FAMOTIDINE 40 MG: 20 TABLET, FILM COATED ORAL at 08:20

## 2023-01-01 RX ADMIN — VANCOMYCIN HYDROCHLORIDE 250 MG: 125 CAPSULE ORAL at 19:01

## 2023-01-01 RX ADMIN — CEFEPIME HYDROCHLORIDE 2 G: 2 INJECTION, POWDER, FOR SOLUTION INTRAVENOUS at 14:43

## 2023-01-01 RX ADMIN — VANCOMYCIN HYDROCHLORIDE 125 MG: KIT at 08:29

## 2023-01-01 RX ADMIN — FAMOTIDINE 40 MG: 20 TABLET ORAL at 08:56

## 2023-01-01 RX ADMIN — VANCOMYCIN HYDROCHLORIDE 125 MG: KIT at 21:08

## 2023-01-01 RX ADMIN — FAMOTIDINE 40 MG: 20 TABLET ORAL at 21:22

## 2023-01-01 RX ADMIN — DULOXETINE HYDROCHLORIDE 60 MG: 30 CAPSULE, DELAYED RELEASE ORAL at 09:21

## 2023-01-01 RX ADMIN — FERROUS SULFATE TAB 325 MG (65 MG ELEMENTAL FE) 325 MG: 325 (65 FE) TAB at 09:23

## 2023-01-01 RX ADMIN — DOCOSANOL: 100 CREAM TOPICAL at 22:47

## 2023-01-01 RX ADMIN — DULOXETINE HYDROCHLORIDE 60 MG: 60 CAPSULE, DELAYED RELEASE ORAL at 17:01

## 2023-01-01 RX ADMIN — POTASSIUM CHLORIDE 40 MEQ: 750 TABLET, EXTENDED RELEASE ORAL at 14:38

## 2023-01-01 RX ADMIN — CEFEPIME HYDROCHLORIDE 2 G: 2 INJECTION, POWDER, FOR SOLUTION INTRAVENOUS at 03:18

## 2023-01-01 RX ADMIN — SODIUM CHLORIDE 85 ML: 9 INJECTION, SOLUTION INTRAVENOUS at 02:32

## 2023-01-01 RX ADMIN — FAMOTIDINE 40 MG: 20 TABLET, FILM COATED ORAL at 09:08

## 2023-01-01 RX ADMIN — DULOXETINE HYDROCHLORIDE 60 MG: 60 CAPSULE, DELAYED RELEASE ORAL at 08:42

## 2023-01-01 RX ADMIN — FLUDROCORTISONE ACETATE 0.1 MG: 0.1 TABLET ORAL at 09:27

## 2023-01-01 RX ADMIN — GABAPENTIN 600 MG: 300 CAPSULE ORAL at 21:22

## 2023-01-01 RX ADMIN — FLUDROCORTISONE ACETATE 0.1 MG: 0.1 TABLET ORAL at 08:44

## 2023-01-01 RX ADMIN — VANCOMYCIN HYDROCHLORIDE 125 MG: KIT at 09:55

## 2023-01-01 RX ADMIN — UMECLIDINIUM 1 PUFF: 62.5 AEROSOL, POWDER ORAL at 12:22

## 2023-01-01 RX ADMIN — VANCOMYCIN HYDROCHLORIDE 125 MG: 125 CAPSULE ORAL at 08:20

## 2023-01-01 RX ADMIN — METRONIDAZOLE 500 MG: 500 INJECTION, SOLUTION INTRAVENOUS at 10:35

## 2023-01-01 RX ADMIN — ATORVASTATIN CALCIUM 10 MG: 10 TABLET, FILM COATED ORAL at 09:23

## 2023-01-01 RX ADMIN — GABAPENTIN 900 MG: 300 CAPSULE ORAL at 23:03

## 2023-01-01 RX ADMIN — METRONIDAZOLE 500 MG: 500 INJECTION, SOLUTION INTRAVENOUS at 10:50

## 2023-01-01 RX ADMIN — VANCOMYCIN HYDROCHLORIDE 125 MG: 125 CAPSULE ORAL at 09:21

## 2023-01-01 RX ADMIN — FLUTICASONE FUROATE AND VILANTEROL TRIFENATATE 1 PUFF: 200; 25 POWDER RESPIRATORY (INHALATION) at 09:11

## 2023-01-01 RX ADMIN — ATORVASTATIN CALCIUM 10 MG: 10 TABLET, FILM COATED ORAL at 08:56

## 2023-01-01 RX ADMIN — UMECLIDINIUM 1 PUFF: 62.5 AEROSOL, POWDER ORAL at 09:23

## 2023-01-01 RX ADMIN — SODIUM CHLORIDE: 9 INJECTION, SOLUTION INTRAVENOUS at 09:22

## 2023-01-01 RX ADMIN — VANCOMYCIN HYDROCHLORIDE 125 MG: 125 CAPSULE ORAL at 18:20

## 2023-01-01 RX ADMIN — GABAPENTIN 600 MG: 300 CAPSULE ORAL at 21:39

## 2023-01-01 RX ADMIN — FLUTICASONE FUROATE AND VILANTEROL TRIFENATATE 1 PUFF: 200; 25 POWDER RESPIRATORY (INHALATION) at 08:26

## 2023-01-01 RX ADMIN — APIXABAN 10 MG: 5 TABLET, FILM COATED ORAL at 21:22

## 2023-01-01 RX ADMIN — VANCOMYCIN HYDROCHLORIDE 125 MG: KIT at 16:57

## 2023-01-01 RX ADMIN — VANCOMYCIN HYDROCHLORIDE 125 MG: 125 CAPSULE ORAL at 22:58

## 2023-01-01 RX ADMIN — ACETAMINOPHEN 1000 MG: 500 TABLET, FILM COATED ORAL at 23:22

## 2023-01-01 RX ADMIN — APIXABAN 10 MG: 5 TABLET, FILM COATED ORAL at 08:25

## 2023-01-01 RX ADMIN — GABAPENTIN 600 MG: 300 CAPSULE ORAL at 21:43

## 2023-01-01 RX ADMIN — METRONIDAZOLE 500 MG: 500 INJECTION, SOLUTION INTRAVENOUS at 01:30

## 2023-01-01 RX ADMIN — FLUDROCORTISONE ACETATE 0.1 MG: 0.1 TABLET ORAL at 13:12

## 2023-01-01 RX ADMIN — FAMOTIDINE 40 MG: 20 TABLET ORAL at 09:55

## 2023-01-01 RX ADMIN — DOCOSANOL: 100 CREAM TOPICAL at 09:10

## 2023-01-01 RX ADMIN — HEPARIN SODIUM 1200 UNITS/HR: 10000 INJECTION, SOLUTION INTRAVENOUS at 05:05

## 2023-01-01 RX ADMIN — VANCOMYCIN HYDROCHLORIDE 250 MG: 125 CAPSULE ORAL at 12:44

## 2023-01-01 RX ADMIN — DOCOSANOL: 100 CREAM TOPICAL at 14:37

## 2023-01-01 RX ADMIN — FERROUS SULFATE TAB 325 MG (65 MG ELEMENTAL FE) 325 MG: 325 (65 FE) TAB at 08:42

## 2023-01-01 RX ADMIN — FLUTICASONE FUROATE AND VILANTEROL TRIFENATATE 1 PUFF: 200; 25 POWDER RESPIRATORY (INHALATION) at 09:30

## 2023-01-01 RX ADMIN — VANCOMYCIN HYDROCHLORIDE 125 MG: KIT at 22:25

## 2023-01-01 RX ADMIN — FERROUS SULFATE TAB 325 MG (65 MG ELEMENTAL FE) 325 MG: 325 (65 FE) TAB at 20:25

## 2023-01-01 RX ADMIN — POTASSIUM CHLORIDE 10 MEQ: 750 TABLET, EXTENDED RELEASE ORAL at 08:44

## 2023-01-01 RX ADMIN — TAMSULOSIN HYDROCHLORIDE 0.4 MG: 0.4 CAPSULE ORAL at 09:15

## 2023-01-01 RX ADMIN — VANCOMYCIN HYDROCHLORIDE 125 MG: KIT at 18:02

## 2023-01-01 RX ADMIN — VANCOMYCIN HYDROCHLORIDE 125 MG: 125 CAPSULE ORAL at 09:15

## 2023-01-01 RX ADMIN — DOCOSANOL: 100 CREAM TOPICAL at 12:28

## 2023-01-01 RX ADMIN — GABAPENTIN 900 MG: 300 CAPSULE ORAL at 22:58

## 2023-01-01 RX ADMIN — TAMSULOSIN HYDROCHLORIDE 0.4 MG: 0.4 CAPSULE ORAL at 09:21

## 2023-01-01 RX ADMIN — SODIUM CHLORIDE 1000 ML: 9 INJECTION, SOLUTION INTRAVENOUS at 23:21

## 2023-01-01 RX ADMIN — FAMOTIDINE 20 MG: 20 TABLET ORAL at 17:03

## 2023-01-01 RX ADMIN — IOPAMIDOL 74 ML: 755 INJECTION, SOLUTION INTRAVENOUS at 02:32

## 2023-01-01 RX ADMIN — VANCOMYCIN HYDROCHLORIDE 250 MG: 125 CAPSULE ORAL at 13:04

## 2023-01-01 RX ADMIN — FLUTICASONE FUROATE AND VILANTEROL TRIFENATATE 1 PUFF: 200; 25 POWDER RESPIRATORY (INHALATION) at 12:22

## 2023-01-01 RX ADMIN — ATORVASTATIN CALCIUM 10 MG: 10 TABLET, FILM COATED ORAL at 08:44

## 2023-01-01 RX ADMIN — FAMOTIDINE 40 MG: 20 TABLET ORAL at 08:25

## 2023-01-01 RX ADMIN — VANCOMYCIN HYDROCHLORIDE 125 MG: KIT at 14:45

## 2023-01-01 RX ADMIN — ATORVASTATIN CALCIUM 10 MG: 10 TABLET, FILM COATED ORAL at 17:01

## 2023-01-01 RX ADMIN — DOCOSANOL: 100 CREAM TOPICAL at 17:27

## 2023-01-01 RX ADMIN — FAMOTIDINE 40 MG: 20 TABLET, FILM COATED ORAL at 09:14

## 2023-01-01 RX ADMIN — FERROUS SULFATE TAB 325 MG (65 MG ELEMENTAL FE) 325 MG: 325 (65 FE) TAB at 22:42

## 2023-01-01 RX ADMIN — FAMOTIDINE 20 MG: 20 TABLET ORAL at 09:23

## 2023-01-01 RX ADMIN — GABAPENTIN 600 MG: 300 CAPSULE ORAL at 21:56

## 2023-01-01 RX ADMIN — VANCOMYCIN HYDROCHLORIDE 125 MG: KIT at 09:29

## 2023-01-01 RX ADMIN — SODIUM CHLORIDE: 9 INJECTION, SOLUTION INTRAVENOUS at 01:23

## 2023-01-01 RX ADMIN — UMECLIDINIUM 1 PUFF: 62.5 AEROSOL, POWDER ORAL at 09:30

## 2023-01-01 RX ADMIN — IOPAMIDOL 75 ML: 755 INJECTION, SOLUTION INTRAVENOUS at 20:29

## 2023-01-01 RX ADMIN — VANCOMYCIN HYDROCHLORIDE 250 MG: 125 CAPSULE ORAL at 08:41

## 2023-01-01 RX ADMIN — VANCOMYCIN HYDROCHLORIDE 125 MG: 125 CAPSULE ORAL at 15:52

## 2023-01-01 RX ADMIN — APIXABAN 10 MG: 5 TABLET, FILM COATED ORAL at 20:13

## 2023-01-01 RX ADMIN — TAMSULOSIN HYDROCHLORIDE 0.4 MG: 0.4 CAPSULE ORAL at 08:44

## 2023-01-01 RX ADMIN — VANCOMYCIN HYDROCHLORIDE 125 MG: KIT at 08:48

## 2023-01-01 RX ADMIN — VANCOMYCIN HYDROCHLORIDE 250 MG: 125 CAPSULE ORAL at 11:59

## 2023-01-01 RX ADMIN — SODIUM CHLORIDE: 9 INJECTION, SOLUTION INTRAVENOUS at 02:14

## 2023-01-01 RX ADMIN — GABAPENTIN 600 MG: 300 CAPSULE ORAL at 20:12

## 2023-01-01 RX ADMIN — SODIUM CHLORIDE: 9 INJECTION, SOLUTION INTRAVENOUS at 13:34

## 2023-01-01 RX ADMIN — VANCOMYCIN HYDROCHLORIDE 125 MG: KIT at 21:22

## 2023-01-01 RX ADMIN — UMECLIDINIUM 1 PUFF: 62.5 AEROSOL, POWDER ORAL at 09:04

## 2023-01-01 RX ADMIN — TAMSULOSIN HYDROCHLORIDE 0.4 MG: 0.4 CAPSULE ORAL at 08:25

## 2023-01-01 RX ADMIN — UMECLIDINIUM 1 PUFF: 62.5 AEROSOL, POWDER ORAL at 09:58

## 2023-01-01 RX ADMIN — GABAPENTIN 600 MG: 300 CAPSULE ORAL at 21:09

## 2023-01-01 RX ADMIN — TAMSULOSIN HYDROCHLORIDE 0.4 MG: 0.4 CAPSULE ORAL at 12:21

## 2023-01-01 RX ADMIN — FAMOTIDINE 40 MG: 20 TABLET ORAL at 20:12

## 2023-01-01 RX ADMIN — METRONIDAZOLE 500 MG: 500 INJECTION, SOLUTION INTRAVENOUS at 18:21

## 2023-01-01 RX ADMIN — VANCOMYCIN HYDROCHLORIDE 125 MG: KIT at 16:33

## 2023-01-01 RX ADMIN — VANCOMYCIN HYDROCHLORIDE 125 MG: 125 CAPSULE ORAL at 08:34

## 2023-01-01 RX ADMIN — DULOXETINE HYDROCHLORIDE 60 MG: 30 CAPSULE, DELAYED RELEASE ORAL at 08:20

## 2023-01-01 RX ADMIN — GABAPENTIN 900 MG: 300 CAPSULE ORAL at 22:42

## 2023-01-01 RX ADMIN — ATORVASTATIN CALCIUM 10 MG: 10 TABLET, FILM COATED ORAL at 12:22

## 2023-01-01 RX ADMIN — UMECLIDINIUM 1 PUFF: 62.5 AEROSOL, POWDER ORAL at 09:25

## 2023-01-01 RX ADMIN — TAMSULOSIN HYDROCHLORIDE 0.4 MG: 0.4 CAPSULE ORAL at 08:56

## 2023-01-01 RX ADMIN — ATORVASTATIN CALCIUM 10 MG: 10 TABLET, FILM COATED ORAL at 08:21

## 2023-01-01 RX ADMIN — VANCOMYCIN HYDROCHLORIDE 125 MG: KIT at 20:21

## 2023-01-01 RX ADMIN — FERROUS SULFATE TAB 325 MG (65 MG ELEMENTAL FE) 325 MG: 325 (65 FE) TAB at 20:48

## 2023-01-01 RX ADMIN — TAMSULOSIN HYDROCHLORIDE 0.4 MG: 0.4 CAPSULE ORAL at 09:55

## 2023-01-01 RX ADMIN — TAMSULOSIN HYDROCHLORIDE 0.4 MG: 0.4 CAPSULE ORAL at 09:23

## 2023-01-01 RX ADMIN — HUMAN ALBUMIN MICROSPHERES AND PERFLUTREN 9 ML: 10; .22 INJECTION, SOLUTION INTRAVENOUS at 15:30

## 2023-01-01 RX ADMIN — UMECLIDINIUM 1 PUFF: 62.5 AEROSOL, POWDER ORAL at 09:11

## 2023-01-01 RX ADMIN — VANCOMYCIN HYDROCHLORIDE 250 MG: 125 CAPSULE ORAL at 16:29

## 2023-01-01 RX ADMIN — VANCOMYCIN HYDROCHLORIDE 125 MG: 125 CAPSULE ORAL at 23:03

## 2023-01-01 RX ADMIN — METRONIDAZOLE 500 MG: 500 INJECTION, SOLUTION INTRAVENOUS at 17:26

## 2023-01-01 RX ADMIN — METRONIDAZOLE 500 MG: 500 INJECTION, SOLUTION INTRAVENOUS at 09:22

## 2023-01-01 ASSESSMENT — ACTIVITIES OF DAILY LIVING (ADL)
ADLS_ACUITY_SCORE: 47
ADLS_ACUITY_SCORE: 29
ADLS_ACUITY_SCORE: 47
ADLS_ACUITY_SCORE: 50
ADLS_ACUITY_SCORE: 47
ADLS_ACUITY_SCORE: 50
ADLS_ACUITY_SCORE: 50
ADLS_ACUITY_SCORE: 49
ADLS_ACUITY_SCORE: 41
ADLS_ACUITY_SCORE: 35
ADLS_ACUITY_SCORE: 50
ADLS_ACUITY_SCORE: 50
ADLS_ACUITY_SCORE: 33
ADLS_ACUITY_SCORE: 33
ADLS_ACUITY_SCORE: 50
WEAR_GLASSES_OR_BLIND: YES
ADLS_ACUITY_SCORE: 41
ADLS_ACUITY_SCORE: 52
ADLS_ACUITY_SCORE: 50
ADLS_ACUITY_SCORE: 35
ADLS_ACUITY_SCORE: 47
ADLS_ACUITY_SCORE: 37
ADLS_ACUITY_SCORE: 41
ADLS_ACUITY_SCORE: 35
ADLS_ACUITY_SCORE: 48
ADLS_ACUITY_SCORE: 35
ADLS_ACUITY_SCORE: 41
DIFFICULTY_EATING/SWALLOWING: NO
ADLS_ACUITY_SCORE: 48
ADLS_ACUITY_SCORE: 48
ADLS_ACUITY_SCORE: 41
ADLS_ACUITY_SCORE: 35
ADLS_ACUITY_SCORE: 54
ADLS_ACUITY_SCORE: 37
ADLS_ACUITY_SCORE: 41
DRESSING/BATHING_DIFFICULTY: NO
ADLS_ACUITY_SCORE: 29
ADLS_ACUITY_SCORE: 43
ADLS_ACUITY_SCORE: 29
ADLS_ACUITY_SCORE: 37
ADLS_ACUITY_SCORE: 43
ADLS_ACUITY_SCORE: 41
ADLS_ACUITY_SCORE: 50
ADLS_ACUITY_SCORE: 33
ADLS_ACUITY_SCORE: 33
ADLS_ACUITY_SCORE: 49
ADLS_ACUITY_SCORE: 49
ADLS_ACUITY_SCORE: 35
ADLS_ACUITY_SCORE: 50
ADLS_ACUITY_SCORE: 35
ADLS_ACUITY_SCORE: 50
ADLS_ACUITY_SCORE: 33
ADLS_ACUITY_SCORE: 48
ADLS_ACUITY_SCORE: 49
DIFFICULTY_EATING/SWALLOWING: NO
ADLS_ACUITY_SCORE: 33
ADLS_ACUITY_SCORE: 35
ADLS_ACUITY_SCORE: 49
ADLS_ACUITY_SCORE: 35
ADLS_ACUITY_SCORE: 41
ADLS_ACUITY_SCORE: 48
ADLS_ACUITY_SCORE: 37
ADLS_ACUITY_SCORE: 35
ADLS_ACUITY_SCORE: 35
ADLS_ACUITY_SCORE: 43
ADLS_ACUITY_SCORE: 47
ADLS_ACUITY_SCORE: 29
WALKING_OR_CLIMBING_STAIRS_DIFFICULTY: NO
ADLS_ACUITY_SCORE: 29
ADLS_ACUITY_SCORE: 35
ADLS_ACUITY_SCORE: 37
ADLS_ACUITY_SCORE: 35
ADLS_ACUITY_SCORE: 54
ADLS_ACUITY_SCORE: 41
ADLS_ACUITY_SCORE: 29
ADLS_ACUITY_SCORE: 54
ADLS_ACUITY_SCORE: 35
ADLS_ACUITY_SCORE: 37
ADLS_ACUITY_SCORE: 35
ADLS_ACUITY_SCORE: 47
FALL_HISTORY_WITHIN_LAST_SIX_MONTHS: YES
ADLS_ACUITY_SCORE: 47
ADLS_ACUITY_SCORE: 50
ADLS_ACUITY_SCORE: 35
ADLS_ACUITY_SCORE: 50
ADLS_ACUITY_SCORE: 41
ADLS_ACUITY_SCORE: 50
ADLS_ACUITY_SCORE: 35
ADLS_ACUITY_SCORE: 33
WALKING_OR_CLIMBING_STAIRS_DIFFICULTY: NO
ADLS_ACUITY_SCORE: 50
ADLS_ACUITY_SCORE: 48
ADLS_ACUITY_SCORE: 41
ADLS_ACUITY_SCORE: 29
ADLS_ACUITY_SCORE: 37
ADLS_ACUITY_SCORE: 35
TOILETING_ISSUES: NO
ADLS_ACUITY_SCORE: 30
ADLS_ACUITY_SCORE: 54
ADLS_ACUITY_SCORE: 50
ADLS_ACUITY_SCORE: 37
ADLS_ACUITY_SCORE: 41
ADLS_ACUITY_SCORE: 41
DRESSING/BATHING_DIFFICULTY: NO
ADLS_ACUITY_SCORE: 47
ADLS_ACUITY_SCORE: 37
ADLS_ACUITY_SCORE: 41
TOILETING_ISSUES: NO
ADLS_ACUITY_SCORE: 54
CHANGE_IN_FUNCTIONAL_STATUS_SINCE_ONSET_OF_CURRENT_ILLNESS/INJURY: YES
ADLS_ACUITY_SCORE: 49
ADLS_ACUITY_SCORE: 41
ADLS_ACUITY_SCORE: 37
ADLS_ACUITY_SCORE: 50
ADLS_ACUITY_SCORE: 43
ADLS_ACUITY_SCORE: 49
ADLS_ACUITY_SCORE: 50
FALL_HISTORY_WITHIN_LAST_SIX_MONTHS: YES
ADLS_ACUITY_SCORE: 50
ADLS_ACUITY_SCORE: 41
ADLS_ACUITY_SCORE: 41
ADLS_ACUITY_SCORE: 50
ADLS_ACUITY_SCORE: 29
ADLS_ACUITY_SCORE: 54
ADLS_ACUITY_SCORE: 37
ADLS_ACUITY_SCORE: 54
ADLS_ACUITY_SCORE: 50
ADLS_ACUITY_SCORE: 35
DOING_ERRANDS_INDEPENDENTLY_DIFFICULTY: NO
EQUIPMENT_CURRENTLY_USED_AT_HOME: CANE, STRAIGHT;CANE, QUAD
ADLS_ACUITY_SCORE: 41
ADLS_ACUITY_SCORE: 41
CONCENTRATING,_REMEMBERING_OR_MAKING_DECISIONS_DIFFICULTY: YES
DOING_ERRANDS_INDEPENDENTLY_DIFFICULTY: NO
ADLS_ACUITY_SCORE: 37
ADLS_ACUITY_SCORE: 48
ADLS_ACUITY_SCORE: 54
ADLS_ACUITY_SCORE: 50
DEPENDENT_IADLS:: INDEPENDENT
ADLS_ACUITY_SCORE: 47
ADLS_ACUITY_SCORE: 29
ADLS_ACUITY_SCORE: 41
ADLS_ACUITY_SCORE: 37
ADLS_ACUITY_SCORE: 41
ADLS_ACUITY_SCORE: 37
ADLS_ACUITY_SCORE: 35
ADLS_ACUITY_SCORE: 35
ADLS_ACUITY_SCORE: 43
ADLS_ACUITY_SCORE: 33
ADLS_ACUITY_SCORE: 48
ADLS_ACUITY_SCORE: 50
ADLS_ACUITY_SCORE: 35
WEAR_GLASSES_OR_BLIND: YES
ADLS_ACUITY_SCORE: 35
ADLS_ACUITY_SCORE: 41
ADLS_ACUITY_SCORE: 54
ADLS_ACUITY_SCORE: 52
ADLS_ACUITY_SCORE: 43
CONCENTRATING,_REMEMBERING_OR_MAKING_DECISIONS_DIFFICULTY: YES
ADLS_ACUITY_SCORE: 50
ADLS_ACUITY_SCORE: 50
ADLS_ACUITY_SCORE: 35
EQUIPMENT_CURRENTLY_USED_AT_HOME: WALKER, STANDARD
ADLS_ACUITY_SCORE: 30
ADLS_ACUITY_SCORE: 37

## 2023-01-01 ASSESSMENT — PAIN SCALES - GENERAL
PAINLEVEL: SEVERE PAIN (7)
PAINLEVEL: NO PAIN (0)
PAINLEVEL: EXTREME PAIN (9)
PAINLEVEL: NO PAIN (0)
PAINLEVEL: NO PAIN (0)
PAINLEVEL: MILD PAIN (3)

## 2023-01-02 ENCOUNTER — MEDICAL CORRESPONDENCE (OUTPATIENT)
Dept: HEALTH INFORMATION MANAGEMENT | Facility: CLINIC | Age: 88
End: 2023-01-02

## 2023-01-02 NOTE — TELEPHONE ENCOUNTER
Please see updated telephone encounter on 12/30/22 - addressed in that encounter.    Signing this encounter.    Luis Mccabe RN  [facility]

## 2023-01-31 ENCOUNTER — VIRTUAL VISIT (OUTPATIENT)
Dept: PHARMACY | Facility: CLINIC | Age: 88
End: 2023-01-31
Payer: COMMERCIAL

## 2023-01-31 DIAGNOSIS — R52 PAIN: ICD-10-CM

## 2023-01-31 DIAGNOSIS — Z78.9 TAKES DIETARY SUPPLEMENTS: ICD-10-CM

## 2023-01-31 DIAGNOSIS — L29.9 ITCHING: ICD-10-CM

## 2023-01-31 DIAGNOSIS — R39.9 LOWER URINARY TRACT SYMPTOMS (LUTS): ICD-10-CM

## 2023-01-31 DIAGNOSIS — K21.9 GASTROESOPHAGEAL REFLUX DISEASE WITHOUT ESOPHAGITIS: Primary | ICD-10-CM

## 2023-01-31 DIAGNOSIS — J44.9 CHRONIC OBSTRUCTIVE PULMONARY DISEASE, UNSPECIFIED COPD TYPE (H): ICD-10-CM

## 2023-01-31 PROCEDURE — 99605 MTMS BY PHARM NP 15 MIN: CPT | Performed by: PHARMACIST

## 2023-01-31 PROCEDURE — 99607 MTMS BY PHARM ADDL 15 MIN: CPT | Performed by: PHARMACIST

## 2023-01-31 RX ORDER — HYDROXYZINE HYDROCHLORIDE 50 MG/1
50 TABLET, FILM COATED ORAL 2 TIMES DAILY
COMMUNITY
End: 2023-01-01

## 2023-01-31 RX ORDER — GABAPENTIN 300 MG/1
600 CAPSULE ORAL DAILY
COMMUNITY
Start: 2022-03-08 | End: 2023-03-16

## 2023-01-31 RX ORDER — DULOXETIN HYDROCHLORIDE 60 MG/1
60 CAPSULE, DELAYED RELEASE ORAL DAILY
COMMUNITY
End: 2023-01-01

## 2023-01-31 RX ORDER — TAMSULOSIN HYDROCHLORIDE 0.4 MG/1
0.4 CAPSULE ORAL DAILY
Status: ON HOLD | COMMUNITY
Start: 2022-12-28 | End: 2023-01-01

## 2023-01-31 RX ORDER — FAMOTIDINE 40 MG/1
40 TABLET, FILM COATED ORAL 2 TIMES DAILY
Qty: 60 TABLET | Refills: 3 | Status: ON HOLD | OUTPATIENT
Start: 2023-01-31 | End: 2023-01-01

## 2023-01-31 NOTE — LETTER
February 1, 2023  Hermilo MADRID Eva  [address] [facility] [place] [address]    Dear Brennan Velasquez, [facility]     Thank you for talking with me on Jan 31, 2023 about your health and medications. As a follow-up to our conversation, I have included two documents:      1. Your Recommended To-Do List has steps you should take to get the best results from your medications.  2. Your Medication List will help you keep track of your medications and how to take them.    If you want to talk about these documents, please call Valentin Cat RPH at phone: [phone], Monday-Friday 8-4:30pm.    I look forward to working with you and your doctors to make sure your medications work well for you.    Sincerely,  Valentin Cat RPH  [facility] Pharmacist, [facility]

## 2023-01-31 NOTE — LETTER
"Recommended To-Do List      Prepared on: Jan 31, 2023       You can get the best results from your medications by completing the items on this \"To-Do List.\"      Bring your To-Do List when you go to your doctor. And, share it with your family or caregivers.    My To-Do List:  What we talked about: What I should do:   The importance of taking your medication as intended    Education: Take your Breo as prescribed           What we talked about: What I should do:                       "

## 2023-01-31 NOTE — PROGRESS NOTES
Medication Therapy Management (MTM) Encounter    ASSESSMENT:                            Medication Adherence/Access: No issues identified    LUTS: Stable.     Itching: Stable.      Pain: Stable.      COPD: Patient should resume taking his Breo as prescribed.     Heartburn: Stable. Needs refill and will refill today.     Supplements: Stable.     PLAN:                            1. Reschedule your missed visit with Dr. Bishop.   2. Restart taking Breo daily.   3. Will refill Pepcid today    Follow-up: Return in about 12 weeks (around 4/25/2023) for Follow up, with me, using a phone visit.    SUBJECTIVE/OBJECTIVE:                          Hermilo Velasquez is a 89 year old male called for a follow-up visit.  Today's visit is a follow-up MTM visit from 12/29/22     Reason for visit: CMR.    Allergies/ADRs: Reviewed in chart  Past Medical History: Reviewed in chart  Tobacco: He reports that he quit smoking about 25 years ago. His smoking use included cigarettes. He has a 110.00 pack-year smoking history. He has never used smokeless tobacco.  Alcohol: no      Medication Adherence/Access: no issues reported- [facility]    LUTS: Currently taking tamsulosin 0.4 mg daily and reports this is helpful. No concerns or questions at this time.     Itching: Currently taking hydroxyzine 50 mg twice a day and reports this is helpful. No concerns or questions at this time.      Pain: Currently taking acetaminophen 1000 mg three times a day as needed (only takes once in awhile), gabapentin 600 mg at night, and  duloxetine 60 mg once daily.  Reports no issues and that this helps.      COPD: Current medications: Breo he reports he hasnt been taking.  LAMA- Spiriva Handihaler 1 puff(s) once daily  Short-Acting Bronchodilator: Albuterol MDI about once a day, Nebs and pt reports using 1 times per week. Also uses Oxygen .    Patient is not experiencing side effects.   Patient reports the following  symptoms: none.     CAD: Currently taking atorvastatin 10 mg daily (reports he has a couple different manufacturers and they have different numbers on them). EF: 50-55%      Not on aspirin due to history of GI bleed.      BP Readings from Last 3 Encounters:   12/28/22 105/55   12/02/22 121/60   11/23/22 111/50       Pulse Readings from Last 3 Encounters:   12/28/22 84   12/02/22 86   11/23/22 76     Recent Labs   Lab Test 11/30/21  1123 06/18/20  1144   CHOL 138 107   HDL 55 32*   LDL 71 53   TRIG 58 109         Heartburn: Currently taking famotidine 40 mg twice a day. Reports that this helps.        Supplements: Currently taking ferrous sulfate 325 mg twice a day. No concerns or questions at this time.     Hemoglobin   Date Value Ref Range Status   11/09/2022 9.2 (L) 13.3 - 17.7 g/dL Final   05/27/2021 11.6 (L) 13.3 - 17.7 g/dL Final   ]      Today's Vitals: There were no vitals taken for this visit.  ----------------      I spent 22 minutes with this patient today. All changes were made via collaborative practice agreement with Karson Bishop MD. A copy of the visit note was provided to the patient's provider(s).    A summary of these recommendations was sent via [employer].    Valentin Cat, Pharm. D., BCACP  Medication Therapy Management Pharmacist  Direct Voicemail: [phone]      Telemedicine Visit Details  Type of service:  Telephone visit  Start Time: 11 am  End Time: 11:22 am     Medication Therapy Recommendations  Chronic obstructive pulmonary disease, unspecified COPD type (H)    Current Medication: fluticasone-vilanterol (BREO ELLIPTA) 200-25 MCG/ACT inhaler   Rationale: Patient forgets to take - Adherence - Adherence   Recommendation: Provide Education   Status: Patient Agreed - Adherence/Education

## 2023-01-31 NOTE — LETTER
_  Medication List        Prepared on: Jan 31, 2023     Bring your Medication List when you go to the doctor, hospital, or   emergency room. And, share it with your family or caregivers.     Note any changes to how you take your medications.  Cross out medications when you no longer use them.    Medication How I take it Why I use it Prescriber   acetaminophen (TYLENOL) 500 MG tablet Take 1,000 mg by mouth 3 times daily as needed Pain Patient Reported   albuterol (PROAIR HFA/PROVENTIL HFA/VENTOLIN HFA) 108 (90 Base) MCG/ACT inhaler Inhale 2 puffs into the lungs every 6 hours as needed COPD Patient Reported   albuterol (PROVENTIL) (2.5 MG/3ML) 0.083% neb solution Take 1 vial (2.5 mg) by nebulization every 6 hours as needed for shortness of breath / dyspnea or wheezing COPD Karson Bishop MD   atorvastatin (LIPITOR) 10 MG tablet Take 1 tablet (10 mg) by mouth daily Heart Disease Karson Bishop MD   DULoxetine (CYMBALTA) 60 MG capsule Take 60 mg by mouth daily Pain Patient Reported   famotidine (PEPCID) 40 MG tablet Take 1 tablet (40 mg) by mouth 2 times daily Gastroesophageal Reflux Disease without Esophagitis Karson Bishop MD   ferrous sulfate (FE TABS) 325 (65 Fe) MG EC tablet Take 1 tablet (325 mg) by mouth 2 times daily Supplement Patient Reported   fluticasone-vilanterol (BREO ELLIPTA) 200-25 MCG/ACT inhaler Inhale 1 puff into the lungs daily COPD Patient Reported   gabapentin (NEURONTIN) 300 MG capsule Take 600 mg by mouth daily Pain Patient Reported   hydrOXYzine (ATARAX) 50 MG tablet Take 50 mg by mouth 2 times daily Itching Patient Reported   tamsulosin (FLOMAX) 0.4 MG capsule Take 0.4 mg by mouth daily Lower Urinary Tract Symptoms Patient Reported   tiotropium (SPIRIVA) 18 MCG inhaled capsule Inhale 18 mcg into the lungs daily COPD Entered By History Unknown         Add new medications, over-the-counter drugs, herbals, vitamins, or  minerals in the blank rows below.    Medication How I take it Why I use  it Prescriber                          Allergies:      omeprazole; pantoprazole; prevacid [lansoprazole]; lasix [furosemide]; lidocaine; penicillin g; penicillins        Side effects I have had:               Other Information:              My notes and questions:

## 2023-02-01 NOTE — PATIENT INSTRUCTIONS
"Recommendations from today's MTM visit:                                                    MTM (medication therapy management) is a service provided by a clinical pharmacist designed to help you get the most of out of your medicines.      . Reschedule your missed visit with Dr. Bishop.   2. Restart taking Breo daily.   3. Will refill Pepcid today    Follow-up: Return in about 12 weeks (around 4/25/2023) for Follow up, with me, using a phone visit.    It was great speaking with you today.  I value your experience and would be very thankful for your time in providing feedback in our clinic survey. In the next few days, you may receive an email or text message from [employer] with a link to a survey related to your  clinical pharmacist.\"     To schedule another MTM appointment, please call the clinic directly or you may call the MTM scheduling line at [phone] or toll-free at [phone].     My Clinical Pharmacist's contact information:                                                      Please feel free to contact me with any questions or concerns you have.      Valentin Cat, Pharm. D., BCACP  Medication Therapy Management Pharmacist  Direct Voicemail: [phone]     "

## 2023-03-13 ENCOUNTER — ANCILLARY PROCEDURE (OUTPATIENT)
Dept: CT IMAGING | Facility: CLINIC | Age: 88
End: 2023-03-13
Attending: INTERNAL MEDICINE
Payer: COMMERCIAL

## 2023-03-13 DIAGNOSIS — C88.00 MACROGLOBULINEMIA: ICD-10-CM

## 2023-03-13 PROCEDURE — 71250 CT THORAX DX C-: CPT

## 2023-03-16 ENCOUNTER — TRANSFERRED RECORDS (OUTPATIENT)
Dept: HEALTH INFORMATION MANAGEMENT | Facility: CLINIC | Age: 88
End: 2023-03-16

## 2023-03-16 ENCOUNTER — OFFICE VISIT (OUTPATIENT)
Dept: FAMILY MEDICINE | Facility: CLINIC | Age: 88
End: 2023-03-16
Payer: COMMERCIAL

## 2023-03-16 VITALS
OXYGEN SATURATION: 94 % | WEIGHT: 156 LBS | BODY MASS INDEX: 23.72 KG/M2 | TEMPERATURE: 97.9 F | HEART RATE: 86 BPM | DIASTOLIC BLOOD PRESSURE: 59 MMHG | SYSTOLIC BLOOD PRESSURE: 102 MMHG | RESPIRATION RATE: 18 BRPM

## 2023-03-16 DIAGNOSIS — C34.91 PRIMARY MALIGNANT NEOPLASM OF RIGHT LUNG (H): ICD-10-CM

## 2023-03-16 DIAGNOSIS — G62.9 PERIPHERAL POLYNEUROPATHY: ICD-10-CM

## 2023-03-16 DIAGNOSIS — I71.40 ABDOMINAL AORTIC ANEURYSM (AAA) WITHOUT RUPTURE, UNSPECIFIED PART (H): ICD-10-CM

## 2023-03-16 DIAGNOSIS — N18.30 STAGE 3 CHRONIC KIDNEY DISEASE, UNSPECIFIED WHETHER STAGE 3A OR 3B CKD (H): ICD-10-CM

## 2023-03-16 DIAGNOSIS — E44.0 MODERATE PROTEIN-CALORIE MALNUTRITION (H): ICD-10-CM

## 2023-03-16 DIAGNOSIS — I50.33 ACUTE ON CHRONIC DIASTOLIC CONGESTIVE HEART FAILURE (H): ICD-10-CM

## 2023-03-16 DIAGNOSIS — M70.61 TROCHANTERIC BURSITIS OF RIGHT HIP: ICD-10-CM

## 2023-03-16 DIAGNOSIS — H91.90 HEARING LOSS, UNSPECIFIED HEARING LOSS TYPE, UNSPECIFIED LATERALITY: ICD-10-CM

## 2023-03-16 DIAGNOSIS — I48.0 PAROXYSMAL ATRIAL FIBRILLATION (H): Primary | ICD-10-CM

## 2023-03-16 PROCEDURE — 20610 DRAIN/INJ JOINT/BURSA W/O US: CPT | Performed by: INTERNAL MEDICINE

## 2023-03-16 PROCEDURE — 99214 OFFICE O/P EST MOD 30 MIN: CPT | Mod: 25 | Performed by: INTERNAL MEDICINE

## 2023-03-16 RX ORDER — GABAPENTIN 300 MG/1
900 CAPSULE ORAL AT BEDTIME
Qty: 270 CAPSULE | Refills: 3 | Status: ON HOLD | OUTPATIENT
Start: 2023-03-16 | End: 2023-01-01

## 2023-03-16 RX ORDER — LIDOCAINE HYDROCHLORIDE 10 MG/ML
3 INJECTION, SOLUTION INFILTRATION; PERINEURAL ONCE
Status: COMPLETED | OUTPATIENT
Start: 2023-03-16 | End: 2023-03-16

## 2023-03-16 RX ADMIN — LIDOCAINE HYDROCHLORIDE 3 ML: 10 INJECTION, SOLUTION INFILTRATION; PERINEURAL at 11:52

## 2023-03-16 ASSESSMENT — PAIN SCALES - GENERAL: PAINLEVEL: SEVERE PAIN (6)

## 2023-03-16 NOTE — PROGRESS NOTES
Assessment & Plan     Paroxysmal atrial fibrillation (H)  stable    Acute on chronic diastolic congestive heart failure (H)  Stable     Moderate protein-calorie malnutrition (H)  improving    Abdominal aortic aneurysm (AAA) without rupture, unspecified part  3.9 cm last CT scan    Primary malignant neoplasm of right lung (H)  Post excision    Stage 3 chronic kidney disease, unspecified whether stage 3a or 3b CKD (H)  Stable     Peripheral polyneuropathy  Increase hs gabapentin from 600 to 900   - gabapentin (NEURONTIN) 300 MG capsule; Take 3 capsules (900 mg) by mouth At Bedtime For neuropathy pain    Hearing loss, unspecified hearing loss type, unspecified laterality  Provided referral  - Adult Audiology  Referral; Future    Trochanteric bursitis of right hip  After discussion of risks and benefits, informed consent was obtained.  Area prepped and draped in sterile fashion.  Local anesthesia was obtained with 1% lidocaine.   1cc of K40 was injected into the bursal space.  The procedure was tolerate well and after care was discussed.       - Large Joint/Bursa injection and/or drainage (Shoulder, Knee)  - triamcinolone acetonide (KENALOG-10) injection 10 mg  - lidocaine 1 % injection 3 mL      38 minutes spent on the date of the encounter doing chart review, history and exam, documentation and further activities per the note           Return in about 3 months (around 6/16/2023) for recheck.  Patient instructed to return to clinic or contact us sooner if symptoms worsen or new symptoms develop.     Karson Bishop MD  [facility] RENETTA Akhtar is a 89 year old, presenting for the following health issues:  Follow Up (Patient here for cortisone shot in the right hip, and ear cleaning.)      History of Present Illness       Reason for visit:  Right hip injection and Ear cleaning.    He eats 2-3 servings of fruits and vegetables daily.He consumes 1 sweetened beverage(s) daily.He  exercises with enough effort to increase his heart rate 10 to 19 minutes per day.  He exercises with enough effort to increase his heart rate 7 days per week.   He is taking medications regularly.     Multiple complaints  He wishes his neuropathy pain could be better controlled, but he gets too tired when he takes gabapentin in the day  He has right lateral hip pain with initiating walking and laying on right side; requests injection  He [facility], he thinks his bilateral hearing is worsening      Review of Systems         Objective    /59 (BP Location: Left arm, Patient Position: Sitting, Cuff Size: Adult Regular)   Pulse 86   Temp 97.9  F (36.6  C) (Temporal)   Resp 18   Wt 70.8 kg (156 lb)   SpO2 94%   BMI 23.72 kg/m    Body mass index is 23.72 kg/m .  Physical Exam   GEN:  Well appearing  HEENT:  No wax in either canal, small furuncle in left external canal, mildly tender  CV:  The heart has an irregularly irregular rhythm with a normal rate.   PULM:  The lungs are clear to auscultation bilaterally.     EXT:  No edema  NEURO:  Alert and oriented to person, place and time. Very hard of hearing.  Using a cane.  HIP:  Full pain free right hip joint ROM, tender right hip bursa noted

## 2023-03-22 ENCOUNTER — TRANSFERRED RECORDS (OUTPATIENT)
Dept: HEALTH INFORMATION MANAGEMENT | Facility: CLINIC | Age: 88
End: 2023-03-22

## 2023-04-25 NOTE — PROGRESS NOTES
"Medication Therapy Management (MTM) Encounter    ASSESSMENT:                            Medication Adherence/Access: No issues identified      Pain: Patient did not increase gabapentin as instructed after last visit with Dr. Bishop. Described how it can help his neuropathy and he agreed to try the 3 capsules.      COPD: Improved adherence and improved reported symptoms.     PLAN:                            1. Increase gabapentin to 3 capsules at night to help with neuropathy.     Follow-up: Return in about 6 months (around 10/25/2023).    SUBJECTIVE/OBJECTIVE:                          Hermilo Velasquez is a 89 year old male called for a follow-up visit.  Today's visit is a follow-up MTM visit from 1/31/23     Reason for visit: 3 month follow-up.    Allergies/ADRs: Reviewed in chart  Past Medical History: Reviewed in chart  Tobacco: He reports that he quit smoking about 25 years ago. His smoking use included cigarettes. He has a 110.00 pack-year smoking history. He has never used smokeless tobacco.  Alcohol: none      Medication Adherence/Access: no issues reported      Pain: Currently taking acetaminophen 1000 mg three times a day as needed (only takes once in awhile - not having much pain lately - knee is better), gabapentin 600 mg at night (did not increase to 3 capsules as recommended), and  duloxetine 60 mg once daily.  Reports no issues and that this helps. Still having a little neuropathy pain.      COPD: Current medications: Breo 1 puff once daily  LAMA- Spiriva Handihaler 1 puff(s) once daily  Short-Acting Bronchodilator: Albuterol MDI about once a day or when he feels short of breath, Nebs and pt reports using 1 times per week. Also uses Oxygen .  Reports breathing has been doing \"pretty good\" Oxygen was 98% this morning  Patient is not experiencing side effects.   Patient reports the following symptoms: none.       Today's Vitals: There were no vitals taken for this visit.  ----------------      I spent 11 " minutes with this patient today. All changes were made via collaborative practice agreement with Karson Bishop MD. A copy of the visit note was provided to the patient's provider(s).    A summary of these recommendations was sent via [employer].    Valentin Cat, Pharm. D., BCACP  Medication Therapy Management Pharmacist  Direct Voicemail: [phone]      Telemedicine Visit Details  Type of service:  Telephone visit  Start Time: 11 am  End Time: 11:11 am     Medication Therapy Recommendations  Pain    Current Medication: gabapentin (NEURONTIN) 300 MG capsule   Rationale: Does not understand instructions - Adherence - Adherence   Recommendation: Provide Education   Status: Patient Agreed - Adherence/Education

## 2023-04-25 NOTE — PATIENT INSTRUCTIONS
"Recommendations from today's MTM visit:                                                    MTM (medication therapy management) is a service provided by a clinical pharmacist designed to help you get the most of out of your medicines.   Today we reviewed what your medicines are for, how to know if they are working, that your medicines are safe and how to make your medicine regimen as easy as possible.      1. Increase gabapentin to 3 capsules at night to help with neuropathy.     Follow-up: Return in about 6 months (around 10/25/2023).    It was great speaking with you today.  I value your experience and would be very thankful for your time in providing feedback in our clinic survey. In the next few days, you may receive an email or text message from [employer] with a link to a survey related to your  clinical pharmacist.\"     To schedule another MTM appointment, please call the clinic directly or you may call the MTM scheduling line at [phone] or toll-free at [phone].     My Clinical Pharmacist's contact information:                                                      Please feel free to contact me with any questions or concerns you have.      Valentin Cat, Pharm. D., BCACP  Medication Therapy Management Pharmacist  Direct Voicemail: [phone]     "

## 2023-05-11 NOTE — TELEPHONE ENCOUNTER
Reason for Call:  Appointment Request    Patient requesting this type of appt:  Dizzy feeling, says feeling hot in the head and balance issues     Requested provider: Karson Bishop    Reason patient unable to be scheduled: Not within requested timeframe    When does patient want to be seen/preferred time: 1-2 days    Comments: please call the pt it fit in with PCP     Could we send this information to you in [facility] or would you prefer to receive a phone call?:   Patient would prefer a phone call   Okay to leave a detailed message?: Yes at Home number on file [phone] (home)    Call taken on 5/11/2023 at 11:36 AM by Ana Vang

## 2023-05-12 NOTE — TELEPHONE ENCOUNTER
OK, lets plan on evaluating the symptom on 5/16  If he has worsening symptoms or intolerable symptoms between then and now, recommend ER

## 2023-05-12 NOTE — TELEPHONE ENCOUNTER
TO PCP    Patient experiencing dizziness and states this has been going on for a month now. States he only drinks one glass of water daily. Writer advised increase fluid intake. Asked patient to take BP, 116/48, 44 then 5 minutes later 112/45 39. Oxygen was 97%, After a glass of water and 10 minutes later, 109/44 and 43 on the pulse.     Writer advised ADS who states patient is not appropriate at this time.    Please advise. (did make f/u for 05/17/23 as well).    Appointments in Next Year    May 17, 2023  3:30 PM  (Arrive by 3:10 PM)  Provider Visit with Karson Bishop MD  [facility] ([facility] ) [phone]            Iraida Galindo RN on 5/12/2023 at 1:49 PM        Reason for Disposition    MODERATE dizziness (e.g., interferes with normal activities) (Exception: dizziness caused by heat exposure, sudden standing, or poor fluid intake)    Diastolic BP < 50 mm Hg    Additional Information    Negative: SEVERE difficulty breathing (e.g., struggling for each breath, speaks in single words)    Negative: Shock suspected (e.g., cold/pale/clammy skin, too weak to stand, low BP, rapid pulse)    Negative: Difficult to awaken or acting confused (e.g., disoriented, slurred speech)    Negative: Fainted, and still feels dizzy afterwards    Negative: Overdose (accidental or intentional) of medications    Negative: New neurologic deficit that is present now: * Weakness of the face, arm, or leg on one side of the body * Numbness of the face, arm, or leg on one side of the body * Loss of speech or garbled speech    Negative: Heart beating < 50 beats per minute OR > 140 beats per minute    Negative: Sounds like a life-threatening emergency to the triager    Negative: Chest pain    Negative: Rectal bleeding, bloody stool, or tarry-black stool    Negative: Vomiting is main symptom    Negative: Diarrhea is main symptom    Negative: Headache is main symptom    Negative: Heat exhaustion  suspected (i.e., dehydration from heat exposure)    Negative: Patient states that they are having an anxiety or panic attack    Negative: Dizziness from low blood sugar (i.e., < 60 mg/dl or 3.5 mmol/l)    Negative: SEVERE dizziness (e.g., unable to stand, requires support to walk, feels like passing out now)    Negative: SEVERE headache or neck pain    Negative: Spinning or tilting sensation (vertigo) present now and one or more stroke risk factors (i.e., hypertension, diabetes mellitus, prior stroke/TIA, heart attack, age over 60) (Exception: prior physician evaluation for this AND no different/worse than usual)    Negative: Neurologic deficit that was brief (now gone), ANY of the following:* Weakness of the face, arm, or leg on one side of the body* Numbness of the face, arm, or leg on one side of the body* Loss of speech or garbled speech    Negative: Loss of vision or double vision  (Exception: Similar to previous migraines.)    Negative: Extra heart beats OR irregular heart beating (i.e., 'palpitations')    Negative: Difficulty breathing    Negative: Drinking very little and has signs of dehydration (e.g., no urine > 12 hours, very dry mouth, very lightheaded)    Negative: Follows bleeding (e.g., stomach, rectum, vagina)  (Exception: Became dizzy from sight of small amount blood.)    Negative: Patient sounds very sick or weak to the triager    Negative: Lightheadedness (dizziness) present now, after 2 hours of rest and fluids    Negative: Spinning or tilting sensation (vertigo) present now    Negative: Fever > 103 F (39.4 C)    Negative: Fever > 100.0 F (37.8 C) and has diabetes mellitus or a weak immune system (e.g., HIV positive, cancer chemotherapy, organ transplant, splenectomy, chronic steroids)    Negative: Systolic BP < 90 and feeling dizzy, lightheaded, or weak    Negative: Started suddenly after an allergic medicine, an allergic food, or bee sting    Negative: Shock suspected (e.g., cold/pale/clammy  "skin, too weak to stand, low BP, rapid pulse)    Negative: Difficult to awaken or acting confused (e.g., disoriented, slurred speech)    Negative: Fainted    Negative: Chest pain    Negative: Bleeding (e.g., vomiting blood, rectal bleeding or tarry stools, severe vaginal bleeding)    Negative: Extra heart beats or heart is beating fast (i.e., 'palpitations')    Negative: Sounds like a life-threatening emergency to the triager    Negative: Systolic BP < 80 and NOT dizzy, lightheaded or weak (feels normal)    Negative: Abdominal pain    Negative: Major surgery in the past month    Negative: Fever > 100.4 F (38.0 C)    Negative: Drinking very little and has signs of dehydration (e.g., no urine > 12 hours, very dry mouth, very lightheaded)    Negative: Fall in systolic BP > 20 mm Hg from normal and feeling dizzy, lightheaded, or weak    Negative: Patient sounds very sick or weak to the triager    Negative: Systolic BP < 90 and NOT dizzy, lightheaded or weak    Negative: Systolic BP  while taking blood pressure medications and NOT dizzy, lightheaded or weak    Negative: Fall in systolic BP > 20 mmHg after eating a meal    Negative: Fall in systolic BP > 20 mm Hg from normal and NOT dizzy, lightheaded, or weak    Answer Assessment - Initial Assessment Questions  1. DESCRIPTION: \"Describe your dizziness.\"      Right now I feel dizzy and dazed  2. LIGHTHEADED: \"Do you feel lightheaded?\" (e.g., somewhat faint, woozy, weak upon standing)      I get hot in the forehead and then it comes, I get dizzy and have to have a chair handy.  3. VERTIGO: \"Do you feel like either you or the room is spinning or tilting?\" (i.e. vertigo)      Yes  4. SEVERITY: \"How bad is it?\"  \"Do you feel like you are going to faint?\" \"Can you stand and walk?\"    - MILD: Feels slightly dizzy, but walking normally.    - MODERATE: Feels unsteady when walking, but not falling; interferes with normal activities (e.g., school, work).    - SEVERE: Unable " "to walk without falling, or requires assistance to walk without falling; feels like passing out now.       Moderate  5. ONSET:  \"When did the dizziness begin?\"      I have had it for about a month so far  6. AGGRAVATING FACTORS: \"Does anything make it worse?\" (e.g., standing, change in head position)      The hearing aides bother me  7. HEART RATE: \"Can you tell me your heart rate?\" \"How many beats in 15 seconds?\"  (Note: not all patients can do this)        No  8. CAUSE: \"What do you think is causing the dizziness?\"      My ears, my inner ear  9. RECURRENT SYMPTOM: \"Have you had dizziness before?\" If Yes, ask: \"When was the last time?\" \"What happened that time?\"      Yes  10. OTHER SYMPTOMS: \"Do you have any other symptoms?\" (e.g., fever, chest pain, vomiting, diarrhea, bleeding)        No  11. PREGNANCY: \"Is there any chance you are pregnant?\" \"When was your last menstrual period?\"        N/A    Answer Assessment - Initial Assessment Questions  1. BLOOD PRESSURE: \"What is the blood pressure?\" \"Did you take at least two measurements 5 minutes apart?\"      116/48  2. ONSET: \"When did you take your blood pressure?\"      Today at 1328  3. HOW: \"How did you obtain the blood pressure?\" (e.g., visiting nurse, automatic home BP monitor)      Home cuff  4. HISTORY: \"Do you have a history of low blood pressure?\" \"What is your blood pressure normally?\"      No  5. MEDICATIONS: \"Are you taking any medications for blood pressure?\" If Yes, ask: \"Have they been changed recently?\"      No  6. PULSE RATE: \"Do you know what your pulse rate is?\"      44  7. OTHER SYMPTOMS: \"Have you been sick recently?\" \"Have you had a recent injury?\"    No  8. PREGNANCY: \"Is there any chance you are pregnant?\" \"When was your last menstrual period?\"      N/A    Protocols used: DIZZINESS-A-OH, BLOOD PRESSURE - LOW-A-OH    "

## 2023-05-12 NOTE — TELEPHONE ENCOUNTER
Called patient regarding PCP message below. Patient verbalized understanding.    Iraida Galindo RN on 5/12/2023 at 4:27 PM

## 2023-05-17 NOTE — PROGRESS NOTES
"  Assessment & Plan     Benign paroxysmal positional vertigo, left  I suspect that his dizziness is related to benign paroxysmal positional vertigo and that his dizziness will improve with canalith repositioning maneuvers by physical therapy.  I made an urgent referral because I would like to minimize the chances of this vertigo spell contributing to risk for falls.  There is no evidence of orthostatic blood pressure change in clinic today.  There are no other clear signs or symptoms to explain dizziness on examination.  - Physical Therapy Referral; Future      17 minutes spent by me on the date of the encounter doing chart review, history and exam, documentation and further activities per the note       Return in about 3 weeks (around 6/7/2023) for recheck if symptoms persist.  Patient instructed to return to clinic or contact us sooner if symptoms worsen or new symptoms develop.       Karson Bishop MD  [facility] RENETTA Akhtar is a 89 year old, presenting for the following health issues:    Dizziness         View : No data to display.              HPI       89-year-old man with multiple medical problems including history of recurrent benign positional vertigo that was treated successfully with canalith repositioning maneuver several years ago in his recollection.  He is very hard of hearing which makes the history somewhat challenging.  He states that he has had intermittent vertigo symptoms for the past several days.      Review of Systems         Objective    /52 (BP Location: Left arm, Patient Position: Sitting, Cuff Size: Adult Large)   Pulse 75   Temp 97.8  F (36.6  C) (Oral)   Resp 16   Ht 1.727 m (5' 8\")   Wt 70 kg (154 lb 4.8 oz)   SpO2 97%   BMI 23.46 kg/m    Body mass index is 23.46 kg/m .  Physical Exam   His standing blood pressure is actually higher than his sitting blood pressure  General: This is a well-appearing, nontoxic appearing but frail elderly man " in no acute distress.  Neurological: Alert and oriented to person place and time although he is very hard of hearing, cranial nerves II to XII appear grossly intact, strength is symmetric although he walks with a cane which is not new for him.  The [place]-Hallpike maneuver produces nystagmus with the head turned to the left.  This reproduces his vertigo symptoms.

## 2023-05-25 NOTE — PROGRESS NOTES
PHYSICAL THERAPY EVALUATION  Type of Visit: Evaluation    See electronic medical record for Abuse and Falls Screening details.    Subjective      Presenting condition or subjective complaint:   Patient presents with dizziness with history of BPPV successfully treated in past with CRM per chart review. Symptoms able to be reproduced by PCP with DHP to L. Symptoms began several days before seen in clinic 5/17/2023. [facility].   Date of onset: 03/25/23       VESTIBULAR EVALUATION  ADDITIONAL HISTORY:  Description of symptoms: Nausea or vomiting; I feel like the room is spinning (feels hot)  Dizzy attacks:   Start: a couple months ago   Last attack: yesterday   Frequency of occurrences: everyday   Length of attack: 30 minutes  Difficulty hearing: Both ears  Noise in ears? 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 Vasques      Aortic stenosis     Severe AS, 9/2015 AVR - [facility] TRIFECTA Bovine bioprosthesis 25MM TF-25A     Atrial fibrillation (H)     9/2015 Paroxysmal post op Afib - discharged on Warfarin and a beta blocker     COPD (chronic obstructive pulmonary disease) (H)      Deep vein thrombosis (H)      GERD (gastroesophageal reflux disease)      Heart murmur      Monoclonal gammopathy     plasmacyte prominent causing monoclonal gammopathy     Need for SBE (subacute bacterial endocarditis) prophylaxis      Neuropathy      Other and unspecified hyperlipidemia      Other malignant lymphomas     non hodgkin's lymphoma     RBBB (right bundle branch block)      Severe sepsis with acute organ dysfunction (H) 11/16/2015      Unspecified hereditary and idiopathic peripheral neuropathy        Dates & types of surgery:  R TKA - sometimes hawa    Prior diagnostic imaging/testing results:     Tested with DHP via PCP with noted nystagmus  Prior therapy history for the same diagnosis, illness or injury:    reports having CRM in past for BPPV    Prior Level of Function   Transfers: Assistive equipment  Ambulation: Assistive equipment  ADL: Assistive equipment    Living Environment  Type of home:   house  Stairs to enter the home:       none  Stairs inside the home:       stairs to basement   Help at home:    Equipment owned:   uses cane    Employment:    retired    Patient goals for therapy:  get rid of dizziness    Pain assessment: Pain denied     Objective   Cognitive Status Examination  Orientation: Oriented to person, place and time   Level of Consciousness: Alert    OBSERVATION: Reports to clinic with SPC  POSTURE: Standing Posture: Rounded shoulders, Forward head  STRENGTH: symmetrical strength, B ankle DF 3+/5 - reports B neuropathy    BED MOBILITY: Independent - reported, Min A provided in clinic    TRANSFERS: IND with B UE support    GAIT:   Gait Description: Ambulates with kyphotic posture, evidence of instability, SPC, shorted step lengt    BALANCE:  Heavy visual reliance    SPECIAL TESTS    Dynamic Gait Index (DGI)    (<19/24 indicates fall risk)   Modified CTSIB Conditions (sec) Cond 1: 30 seconds  Cond 2: 3 seconds with fall  Cond 4: N/A  Cond 5 : N/A         SENSATION: B LE neuropathy    VESTIBULAR  Cervicogenic Screen    Neck ROM Restricted to roughly 75% in each direction     Oculomotor Screen    Ocular ROM Normal   Smooth Pursuit Abnormal - saccadic intrusions, delayed tracking with R eye   Saccades Normal - sometimes took additional cuing due to [place]   VOR Normal   VOR Cancellation Normal   Head Impulse Test Normal   Convergence Testing Normal        Infrared Goggle Exam Vestibular Suppressant in Last 24 Hours? Yes  Exam  Completed With: Infrared goggles   Spontaneous Nystagmus Negative   Gaze Evoked Nystagmus Negative    Left Right   [place]-Hallpike Upbeating R torsional, 2 seconds delay, lasted 15 seconds, symptoms N/A   [facility] Supine Roll Test Horizontal L, after re-assessment of DHP, 2 second delay, strong and sympmatic, lasted 15 seconds Horizontal R, after re-assessment of DHP, 2 second delay, strong and symptomatic but less than L, lasted 15 seconds   [facility] Forward Roll Test N/A N/A      BPPV Canal(s): L Horizontal, L Posterior  BPPV Type: Canalithasis    Dynamic Visual Acuity (DVA)    Static Acuity (LogMar)    Horizontal Head Movement at 1 Hz (LogMar)    Horizontal Head Movement at 2 Hz (LogMar)           Assessment & Plan   CLINICAL IMPRESSIONS   Medical Diagnosis: BPPV    Treatment Diagnosis: Dizziness   Impression/Assessment: Patient is a 89 year old male with dizziness complaints.  The following significant findings have been identified: Instability, Dizziness and significant findings with positional testing. These impairments interfere with their ability to perform household chores, household mobility and community mobility as compared to previous level of function. Patient with noted recent falls with increased falls risk, but patient would like to only focus on dizziness symptoms at this time. Will consider further balance assessment/treatment after dizziness clears.     Clinical Decision Making (Complexity):   Clinical Presentation: Stable/Uncomplicated  Clinical Presentation Rationale: based on medical and personal factors listed in PT evaluation  Clinical Decision Making (Complexity): Low complexity    PLAN OF CARE  Treatment Interventions:  Interventions: Gait Training, Neuromuscular Re-education, Canalith Repositioning    Long Term Goals     PT Goal 1  Goal Identifier: BPPV  Goal Description: Patient to display negative positional testing this date and report significant improvement in symptoms in order to have  decreased impact of dizziness on daily tasks  Target Date: 07/24/23  PT Goal 2  Goal Identifier: DHI  Goal Description: Patient to improve on DHI to 5 or less to display significant improvement in dizziness symptoms and reduce impact on daily household tasks and fall risk with turning.  Target Date: 07/24/23      Frequency of Treatment: 1x/week  Duration of Treatment: 60 days    Education Assessment:   Learner/Method: Patient  Education Comments: PT role and POC, assessment findings, vestibular anatomy    Risks and benefits of evaluation/treatment have been explained.   Patient/Family/caregiver agrees with Plan of Care.     Evaluation Time:     PT Eval, Low Complexity Minutes (34797): 25      Signing Clinician: BIANCA Barriga [facility]                                                                                   OUTPATIENT PHYSICAL THERAPY      PLAN OF TREATMENT FOR OUTPATIENT REHABILITATION   Patient's Last Name, First Name, MERCYHermilo Lozano YOB: 1932   Provider's Name   [facility]   Medical Record No.  [id number]     Onset Date: 03/25/23  Start of Care Date: 05/25/23     Medical Diagnosis:  BPPV      PT Treatment Diagnosis:  Dizziness Plan of Treatment  Frequency/Duration: 1x/week/ 60 days    Certification date from 05/25/23 to 07/24/23         See note for plan of treatment details and functional goals     Maria A Castro PT                         I CERTIFY THE NEED FOR THESE SERVICES FURNISHED UNDER        THIS PLAN OF TREATMENT AND WHILE UNDER MY CARE     (Physician attestation of this document indicates review and certification of the therapy plan).                  Referring Provider:  Karson Bishop      Initial Assessment  See Epic Evaluation- Start of Care Date: 05/25/23

## 2023-06-13 NOTE — TELEPHONE ENCOUNTER
Please disregard.  Referring to earlier call of today.    Virginie Landaverde RN, Nurse Advisor 1:19 PM 6/13/2023

## 2023-06-13 NOTE — PROGRESS NOTES
Chief Complaint   Patient presents with     Insect Bites     Bee-sting -right hand, swollen, redness x 2 days ago  - [facility] -getting worse          ICD-10-CM    1. Swelling of right hand  M79.89 CBC with platelets and differential     CBC with platelets and differential      2. Cellulitis of right hand  L03.113 cefTRIAXone (ROCEPHIN) in NS for IM administration 1 g     sulfamethoxazole-trimethoprim (BACTRIM DS) 800-160 MG tablet      3. Cellulitis of right forearm  L03.113 cefTRIAXone (ROCEPHIN) in NS for IM administration 1 g     sulfamethoxazole-trimethoprim (BACTRIM DS) 800-160 MG tablet      [facility] that was normal, CBC yesterday was comparable to today but the patient reports the redness has moved from just his hand up to mid forearm since yesterday.  If this was an allergic reaction that started a week ago it is unlikely that is now worsening with no other exposures.  Primary concern is for cellulitis.  Patient was given ceftriaxone injection here and after 25 minutes he had no reaction and was discharged home.  He will start oral antibiotics tomorrow.  If symptoms fail to improve or worsen he is instructed to go to the emergency room.  Area of erythema was outlined with a skin marker for observation.    43 minutes spent by me on the date of the encounter doing chart review, history and exam, documentation and further activities per the note      Results for orders placed or performed in visit on 06/13/23 (from the past 24 hour(s))   CBC with platelets and differential    Narrative    The following orders were created for panel order CBC with platelets and differential.  Procedure                               Abnormality         Status                     ---------                               -----------         ------                     CBC with platelets and d...[[id number]]  Abnormal            Final result                 Please view results  for these tests on the individual orders.   CBC with platelets and differential   Result Value Ref Range    WBC Count 9.4 4.0 - 11.0 10e3/uL    RBC Count 4.36 (L) 4.40 - 5.90 10e6/uL    Hemoglobin 12.2 (L) 13.3 - 17.7 g/dL    Hematocrit 38.1 (L) 40.0 - 53.0 %    MCV 87 78 - 100 fL    MCH 28.0 26.5 - 33.0 pg    MCHC 32.0 31.5 - 36.5 g/dL    RDW 13.5 10.0 - 15.0 %    Platelet Count 166 150 - 450 10e3/uL    % Neutrophils 73 %    % Lymphocytes 8 %    % Monocytes 16 %    % Eosinophils 3 %    % Basophils 0 %    % Immature Granulocytes 0 %    Absolute Neutrophils 6.8 1.6 - 8.3 10e3/uL    Absolute Lymphocytes 0.8 0.8 - 5.3 10e3/uL    Absolute Monocytes 1.5 (H) 0.0 - 1.3 10e3/uL    Absolute Eosinophils 0.3 0.0 - 0.7 10e3/uL    Absolute Basophils 0.0 0.0 - 0.2 10e3/uL    Absolute Immature Granulocytes 0.0 <=0.4 10e3/uL     *Note: Due to a large number of results and/or encounters for the requested time period, some results have not been displayed. A complete set of results can be found in Results Review.       Subjective     Hermilo Velasquez is an 89 year old male who presents to clinic today for increased swelling and redness as well as feeling hot of the right hand.  Swelling started about a week ago and has gotten progressively worse.  [facility] and [facility] as well as ran quite a few blood tests and told him it was an allergic type of reaction and would just take time to go away.    Since then the redness has worsened and spread further up the arm and he is having increased amounts of pain.      ROS: 10 point ROS neg other than the symptoms noted above in the HPI.       Objective    /62   Pulse 82   Temp 99  F (37.2  C) (Tympanic)   SpO2 95%   Nurses notes and VS have been reviewed.    Physical Exam        RESP: lungs clear to auscultation - no rales, rhonchi or wheezes     CV: irregularly irregular rhythm     MS: Moves all extremities equally except for the right hand and wrist  and forearm which are very edematous, he has pain with light palpation or any movement of the hand.     SKIN: Exposed skin is warm dry and intact, there are multiple small areas of ecchymosis on his arms, right hand from the fingertips to the mid forearm is red and hot to the touch     NEURO: Alert and oriented, decreased strength that is symmetrical     PSYCH: normal thought process; no significant mood disturbance      ILYA Mcdonald, CNP  [facility] Provider    The use of Dragon/[email or web address] dictation services may have been used to construct the content in this note; any grammatical or spelling errors are non-intentional. Please contact the author of this note directly if you are in need of any clarification.

## 2023-06-13 NOTE — TELEPHONE ENCOUNTER
Pt calling with concerns about;    Right hand bug bite  Whole hand is swollen X 4 days  Today redness and swelling spreading to wrist area  Very painful and itchy     Denies;  Fever  Difficulty breathing/SOB    According to the protocol, patient should see a HCP now.  Care advice given. Patient verbalizes understanding and agrees with plan of care.     Virginie Landaverde RN, Nurse Advisor 10:43 AM 6/13/2023   Reason for Disposition    Red streak or red line and length > 2 inches (5 cm)    Additional Information    Negative: Passed out (i.e., fainted, collapsed and was not responding)    Negative: Wheezing or difficulty breathing    Negative: Hoarseness, cough or tightness in the throat or chest    Negative: Swollen tongue or difficulty swallowing    Negative: Life-threatening reaction (anaphylaxis) in the past to bite from same insect and < 2 hours since bite    Negative: Sounds like a life-threatening emergency to the triager    Negative: Bee sting(s)    Negative: Spider bite(s)    Negative: Tick bite(s)    Negative: Mosquito bite(s)    Negative: Doesn't sound like an insect bite    Negative: Patient sounds very sick or weak to the triager    Negative: SEVERE bite pain and not improved after 2 hours of pain medicine    Negative: Fever and red area    Negative: Fever and area is very tender to touch    Protocols used: INSECT BITE-A-OH

## 2023-06-13 NOTE — PATIENT INSTRUCTIONS
If symptoms worsen at any time please go to the emergency room for further assessment.    Start the sulfa trimethoprim prescription tomorrow morning.    If the redness or swelling has moved beyond the outline tomorrow please go to the emergency room.      Discharge Instructions  Cellulitis    Cellulitis is an infection of the skin that occurs when bacteria enter the skin.   Symptoms are generally redness, swelling, warmth and pain.  Your infection appeared to be appropriate to treat at home with antibiotics.  However, sometimes your infection may be worse than it seemed at first, or may worsen with time. If you have new or worse symptoms, you may need to be seen again in the Emergency Department or by your primary doctor.    Return to the Emergency Department if:  The redness, pain, or swelling gets a lot worse.  If the red area was marked, return if it is red beyond the marked area.  You are unable to get your antibiotics, or are vomiting them up or you can t take them.  You are feeling more ill, weak or lightheaded.  You start to run a new fever (temperature >101).  Anything else about the infection worries or concerns you.  Treatment:  Start your antibiotics right away, and take them as prescribed. Be sure to finish the whole prescription, even if you are better.  Apply a heating pad, warm packs, or warm water soaks to the infected area for 15 minutes at a time, at least 3 times a day. Do not use a heating pad on your feet or legs if you have diabetes. Do not sleep with a heating pad on, since this can cause burns or skin injury.  Rest your injured area for at least 1-2 days. After that you may start using your extremity again as long as there is not too much pain.   Raise the injured area above the level of your heart as much as possible in the first 1-2 days.  Tylenol  (acetaminophen), Motrin  (ibuprofen), or Advil  (ibuprofen) may help may help reduce pain and fever and may help you feel more comfortable. Be  "sure to read and follow the package directions, and ask your doctor if you have questions.    Follow-up with your doctor:  Re-check in clinic within 2-3 days.  Probiotics: If you have been given an antibiotic, you may want to also take a probiotic pill or eat yogurt with live cultures. Probiotics have \"good bacteria\" to help your intestines stay healthy. Studies have shown that probiotics help prevent diarrhea and other intestine problems (including C. diff infection) when you take antibiotics. You can buy these without a prescription in the pharmacy section of the store.     If you were given a prescription for medicine here today, be sure to read all of the information (including the package insert) that comes with your prescription.  This will include important information about the medicine, its side effects, and any warnings that you need to know about.  The pharmacist who fills the prescription can provide more information and answer questions you may have about the medicine.  If you have questions or concerns that the pharmacist cannot address, please call or return to the Emergency Department.     Opioid Medication Information    Pain medications are among the most commonly prescribed medicines, so we are including this information for all our patients. If you did not receive pain medication or get a prescription for pain medicine, you can ignore it.     You may have been given a prescription for an opioid (narcotic) pain medicine and/or have received a pain medicine while here in the Emergency Department. These medicines can make you drowsy or impaired. You must not drive, operate dangerous equipment, or engage in any other dangerous activities while taking these medications. If you drive while taking these medications, you could be arrested for DUI, or driving under the influence. Do not drink any alcohol while you are taking these medications.     Opioid pain medications can cause addiction. If you have a " history of chemical dependency of any type, you are at a higher risk of becoming addicted to pain medications.  Only take these prescribed medications to treat your pain when all other options have been tried. Take it for as short a time and as few doses as possible. Store your pain pills in a secure place, as they are frequently stolen and provide a dangerous opportunity for children or visitors in your house to start abusing these powerful medications. We will not replace any lost or stolen medicine.  As soon as your pain is better, you should flush all your remaining medication.     Many prescription pain medications contain Tylenol  (acetaminophen), including Vicodin , Tylenol #3 , Norco , Lortab , and Percocet .  You should not take any extra pills of Tylenol  if you are using these prescription medications or you can get very sick.  Do not ever take more than 3000 mg of acetaminophen in any 24 hour period.    All opioids tend to cause constipation. Drink plenty of water and eat foods that have a lot of fiber, such as fruits, vegetables, prune juice, apple juice and high fiber cereal.  Take a laxative if you don t move your bowels at least every other day. Miralax , Milk of Magnesia, Colace , or Senna  can be used to keep you regular.      Remember that you can always come back to the Emergency Department if you are not able to see your regular doctor in the amount of time listed above, if you get any new symptoms, or if there is anything that worries you.

## 2023-06-20 NOTE — PROGRESS NOTES
Assessment & Plan     Vertigo  89-year-old man with dizziness complaints not responding to interventions for suspected benign paroxysmal positional vertigo.  Check brain MRI to exclude a central cause for the patient's new dizziness complaints.    If the MRI is negative, the dizziness is probably from multiple sensory deficits including neuropathy and multiple arthritic/replaced joints etc and may still improve with physical therapy although the goal of physical therapy should Be canalith repositioning if that is the case.      - MR Brain w/o & w Contrast; Future  - COVID-19 BIVALENT 12+ (PFIZER)    Anemia, unspecified-his hemoglobin last week was stable    High priority for 2019-nCoV vaccine    - COVID-19 BIVALENT 12+ (PFIZER)      18 minutes spent by me on the date of the encounter doing chart review, history and exam, documentation and further activities per the note           Karson Bishop MD  [facility] is a 89 year old, presenting for the following health issues:  Follow Up (Patient here for a 4 month follow up visit with Dr. Bishop.)         View : No data to display.              History of Present Illness       Reason for visit:  Patient is here for a 4 month follow up.    He eats 2-3 servings of fruits and vegetables daily.He consumes 0 sweetened beverage(s) daily.He exercises with enough effort to increase his heart rate 30 to 60 minutes per day.  He exercises with enough effort to increase his heart rate 7 days per week.   He is taking medications regularly.         89-year-old man with multiple problems including history of GI bleeding, history of pulmonary embolism, COPD, non-Hodgkin's lymphoma, lung cancer, aortic valve replacement.      He came to see me about 3 weeks ago with complaints of dizziness.    History and exam are most consistent with benign positional vertigo    However, he was referred to physical therapy for canalith repositioning and has  "not made much progress in improving his dizziness complaints.    His physical therapist reach out to me with concerns about a potential central cause for his dizziness complaints.    History is challenging due to his profound hearing loss, but he does not feel that he has made much progress with physical therapy interventions    Review of Systems         Objective    /57 (BP Location: Left arm, Patient Position: Sitting, Cuff Size: Adult Regular)   Pulse 85   Temp 97.5  F (36.4  C) (Temporal)   Resp 20   Wt 67.4 kg (148 lb 8 oz)   SpO2 94%   BMI 22.58 kg/m    Body mass index is 22.58 kg/m .  Physical Exam   General: This is a frail elderly man who actually looks improved since previous check.  Neurological: He passes the \"get up and go\" test.  His gait is unsteady and he uses a cane.  There are no obvious areas of focal weakness.  Cranial nerves II to XII appear grossly intact.    Of note, he did have a normal brain MRI in October                "

## 2023-06-23 NOTE — TELEPHONE ENCOUNTER
Order/Referral Request    Who is requesting: Home Medical Suppliers for my oxygent    Orders being requested: Need orders to get oxygen from E    Reason service is needed/diagnosis: NA    When are orders needed by: ASAP    Has this been discussed with Provider: Yes    Does patient have a preference on a Group/Provider/Facility? [facility]    Does patient have an appointment scheduled?: No    Where to send orders: Place orders within Epic [phone]    Could we send this information to you in [facility] or would you prefer to receive a phone call?:   Patient would prefer a phone call   Okay to leave a detailed message?: Yes at Home number on file [phone] (home)

## 2023-06-26 NOTE — TELEPHONE ENCOUNTER
"Called [facility] to see if they would be able to pick it up. They said that the patient themself would need to call to give the information to them.    Called the patient to give him the [facility] number. Unable to get a hold of patient.     Triage outreach    Attempt number 1    Left message to call back at [phone].    CECILIA Lindsey  [facility] Triage     Please relay  \"Call [facility]  [phone], to get your oxygen picked up\".   "

## 2023-06-26 NOTE — TELEPHONE ENCOUNTER
I guess I would need some more information about how to proceed further   What specifically is needed?

## 2023-06-26 NOTE — TELEPHONE ENCOUNTER
"Called patient, Hermilo states he \"wants oxygen to be picked up\". He states that [facility] dropped off the supplies while he was in the hospital. He states they are  Oxygen concentrators. He talked to  \"Edna he said I don't need it no more, cause oxygen is up\".  Hospital ordered it before discharge.   Researched previous note and did not see anything about oxygen continuation or discontinuation/company that supplies oxygen. Unable to get the current liters patient is taking/not taking.   Please advise.     CECILIA Lindsey  [facility] Triage   "

## 2023-06-27 NOTE — TELEPHONE ENCOUNTER
Called patient and reviewed below message with him, gave him [facility] number to call    Yamilex EVERETT, Triage RN  [facility]

## 2023-06-29 NOTE — RESULT ENCOUNTER NOTE
The following letter pertains to your most recent diagnostic tests:    Good news! The MRI does NOT show any dangerous problems in the brain to explain your dizziness.          Sincerely,    Dr. Bishop

## 2023-07-17 NOTE — Clinical Note
Waiting on C Diff assay, but otherwise labs relatively stable and exam with no abdominal tenderness or distension.  No imaging ordered today

## 2023-07-17 NOTE — PROGRESS NOTES
"Aleksander Akhtar is a 89 year old, presenting for the following health issues:  Abdominal Pain         No data to display              HPI     Abdominal/Flank Pain  Onset/Duration: about a week, wife notes patient sleeping a lot  Description:   Character: Dull ache  Location: right in the middle of the abdomen  Radiation: None  Intensity: 7/10   Progression of Symptoms:  improving  Accompanying Signs & Symptoms:  Fever/chills: YES- \"hot feeling on head\"  Gas/Bloating: YES  Nausea: YES  Vomitting: no   Diarrhea: YES  Constipation:no   Dysuria: no            Hematuria: no            Frequency: no            Incontinence of urine: no   History:   Trauma: no   Previous similar pain: no    Previous tests done: none           Previous Abdominal surgery: no   Precipitating factors:   Does the pain change with:     Food: YES- quits when eat     Bowel Movement: YES- better     Urination: no              Other factors: YES- not drinking causes it to get wrong    Therapies tried and outcome:  Tylenol- didn't help    When food last eaten: this morning       Abdominal pain, epigastric   Hermilo Velasquez presents to the clinic accompanied by his wife for symptoms of acute abdominal pain.  Epigastric aching pain centrally located with bloating, fever, light-headedness began about 11 days ago and was described as an intermittent ache.  Symptoms of pain came and went for about 5 days, then loose stools began which have persisted.  His abdominal pain and bloating seem to have dissipated about 3 days ago.  Loose stools are noted to a significant change, but manageable.  He describes them as liquid, brown in color without blood visible.  He did receive one dose of ceftriaxone followed by one week of bactrim starting on 06/13 for an insect bite with cellulitis.  He notes no chest pain, or shortness of breath.  Some nausea, no vomiting.  No urinary or prostate concerns  Gastroesophageal reflux disease without esophagitis   He " continues on famotidine.  Not taking any proton pump inhibitors  Paroxysmal atrial fibrillation (H)   Heart rate remains stable.  No longer treating with apixaban due to prior gastrointestinal bleeding  Essential hypertension with goal blood pressure less than 140/90   Blood pressure remains well controlled.  Off of all blood pressure medications.   Abdominal aortic aneurysm (AAA) without rupture, unspecified part (H)   As above, no longer smoking.  Blood pressure well controlled.  Recent CT chest/abdomen/pelvis 03/2023 showing stable 3.9 cm abdominal aortic aneurysm.    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 Vasques      Aortic stenosis     Severe AS, 9/2015 AVR - [facility] TRIFECTA Bovine bioprosthesis 25MM [id number]     Atrial fibrillation (H)     9/2015 Paroxysmal post op Afib - discharged on Warfarin and a beta blocker     COPD (chronic obstructive pulmonary disease) (H)      Deep vein thrombosis (H)      GERD (gastroesophageal reflux disease)      Heart murmur      Monoclonal gammopathy     plasmacyte prominent causing monoclonal gammopathy     Need for SBE (subacute bacterial endocarditis) prophylaxis      Neuropathy      Other and unspecified hyperlipidemia      Other malignant lymphomas     non hodgkin's lymphoma     RBBB (right bundle branch block)      Severe sepsis with acute organ dysfunction (H) 11/16/2015     Unspecified hereditary and idiopathic peripheral neuropathy      Patient Active Problem List   Diagnosis     Nonrheumatic aortic valve stenosis     GERD (gastroesophageal reflux disease)     Non Hodgkin's lymphoma (H): 2013     Microscopic hematuria     Health Care Home     CARDIOVASCULAR SCREENING; LDL GOAL LESS THAN 130     Anemia due to blood loss, acute     Paroxysmal atrial fib -- on Eliquis      Need for SBE (subacute bacterial endocarditis) prophylaxis     Pulmonary nodules     Essential hypertension with goal  blood pressure less than 140/90     Non-Hodgkin's lymphoma, unspecified body region, unspecified non-Hodgkin lymphoma type (H)     Hyperlipidemia LDL goal <130     Chronic obstructive pulmonary disease, unspecified COPD type (H)     Iron deficiency anemia due to chronic blood loss     Spongiotic dermatitis     Nodule of lower lobe of right lung     Malignant neoplasm of lung, unspecified laterality, unspecified part of lung (H)     Bullous pemphigoid     Centrilobular emphysema (H)     Heart murmur, systolic     History of non-Hodgkin's lymphoma     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     History of aortic valve replacement with tissue graft     Anemia, iron deficiency     FRED (acute kidney injury) (H)     Gastric AVM's -- on EGD 8/17/20     CKD (chronic kidney disease) stage 3, GFR 30-59 ml/min     Gastrointestinal hemorrhage, unspecified gastrointestinal hemorrhage type     Anemia, unspecified type     Pneumonia of left lower lobe due to infectious organism     Acute on chronic diastolic congestive heart failure (H)     History of pulmonary embolism     Hemarthrosis     Total knee replacement status, right     Drusen (degenerative) of macula, bilateral     Lumbar post-laminectomy syndrome     Encephalopathy     Pneumonia of lower lobe due to infectious organism, unspecified laterality     Hypoxia     Acute kidney injury (H)     Severe sepsis (H)     Altered mental status, unspecified altered mental status type     Sepsis (H)     Moderate protein-calorie malnutrition (H)     Abdominal aortic aneurysm (AAA) without rupture, unspecified part (H)     Current Outpatient Medications   Medication Sig Dispense Refill     acetaminophen (TYLENOL) 500 MG tablet Take 1,000 mg by mouth 3 times daily as needed       albuterol (PROAIR HFA/PROVENTIL HFA/VENTOLIN HFA) 108 (90 Base) MCG/ACT inhaler Inhale 2 puffs into the lungs every 6 hours as needed       albuterol (PROVENTIL) (2.5 MG/3ML) 0.083% neb solution Take  "1 vial (2.5 mg) by nebulization every 6 hours as needed for shortness of breath / dyspnea or wheezing 180 mL 11     atorvastatin (LIPITOR) 10 MG tablet Take 1 tablet (10 mg) by mouth daily 90 tablet 0     DULoxetine (CYMBALTA) 60 MG capsule Take 60 mg by mouth daily       famotidine (PEPCID) 40 MG tablet Take 1 tablet (40 mg) by mouth 2 times daily 60 tablet 3     ferrous sulfate (FE TABS) 325 (65 Fe) MG EC tablet Take 1 tablet (325 mg) by mouth 2 times daily       fluticasone-vilanterol (BREO ELLIPTA) 200-25 MCG/ACT inhaler Inhale 1 puff into the lungs daily       gabapentin (NEURONTIN) 300 MG capsule Take 3 capsules (900 mg) by mouth At Bedtime For neuropathy pain 270 capsule 3     hydrOXYzine (ATARAX) 50 MG tablet Take 50 mg by mouth 2 times daily       tamsulosin (FLOMAX) 0.4 MG capsule Take 0.4 mg by mouth daily       tiotropium (SPIRIVA) 18 MCG inhaled capsule Inhale 18 mcg into the lungs daily          Allergies   Allergen Reactions     Omeprazole Itching     Pantoprazole Itching     Prevacid [Lansoprazole] Itching     Lasix [Furosemide] Rash     Lidocaine Blisters and Rash     Allergy to lidocaine ointment  Allergy to lidocaine ointment       Penicillin G Rash     Penicillins Rash     \"broke out from injection\" 60 yrs ago  Tolerates cephalosporins     Social History     Socioeconomic History     Marital status:      Spouse name: Not on file     Number of children: Not on file     Years of education: Not on file     Highest education level: Not on file   Occupational History     Not on file   Tobacco Use     Smoking status: Former     Packs/day: 2.00     Years: 55.00     Pack years: 110.00     Types: Cigarettes     Quit date: 1998     Years since quittin.4     Smokeless tobacco: Never   Vaping Use     Vaping Use: Never used   Substance and Sexual Activity     Alcohol use: Not Currently     Drug use: No     Sexual activity: Not Currently     Partners: Female   Other Topics Concern     " Parent/sibling w/ CABG, MI or angioplasty before 65F 55M? Not Asked      Service Not Asked     Blood Transfusions Not Asked     Caffeine Concern No     Comment: occ     Occupational Exposure Not Asked     Hobby Hazards Not Asked     Sleep Concern Not Asked     Stress Concern Not Asked     Weight Concern Not Asked     Special Diet No     Back Care Not Asked     Exercise No     Bike Helmet Not Asked     Seat Belt Yes     Self-Exams Not Asked   Social History Narrative    , [place]    Retired  - self employed     Social Determinants of Health     Financial Resource Strain: Low Risk  (5/12/2020)    Overall Financial Resource Strain (CARDIA)      Difficulty of Paying Living Expenses: Not hard at all   Food Insecurity: No Food Insecurity (5/12/2020)    Hunger Vital Sign      Worried About Running Out of Food in the Last Year: Never true      Ran Out of Food in the Last Year: Never true   Transportation Needs: No Transportation Needs (5/12/2020)    PRAPARE - Transportation      Lack of Transportation (Medical): No      Lack of Transportation (Non-Medical): No   Physical Activity: Inactive (5/12/2020)    Exercise Vital Sign      Days of Exercise per Week: 0 days      Minutes of Exercise per Session: 0 min   Stress: No Stress Concern Present (5/12/2020)    [employer] - Occupational Stress Questionnaire      Feeling of Stress : Not at all   Social Connections: Moderately Isolated (5/12/2020)    Social Connection and Isolation Panel [NHANES]      Frequency of Communication with Friends and Family: Twice a week      Frequency of Social Gatherings with Friends and Family: Twice a week      Attends [employer] Services: Never      Active Member of Clubs or Organizations: No      Attends Club or Organization Meetings: Never      Marital Status:    Intimate Partner Violence: Not At Risk (5/12/2020)    Humiliation, Afraid, Rape, and Kick  questionnaire      Fear of Current or Ex-Partner: No      Emotionally Abused: No      Physically Abused: No      Sexually Abused: No   Housing Stability: Not on file     Family History   Problem Relation Age of Onset     C.A.D. Father      Emphysema Father      Melanoma No family hx of      Skin Cancer No family hx of        Review of Systems   Constitutional, HEENT, cardiovascular, pulmonary, GI, , musculoskeletal, neuro, skin, endocrine and psych systems are negative, except as otherwise noted.      Objective    /63 (BP Location: Right arm, Patient Position: Chair, Cuff Size: Adult Small)   Pulse 78   Temp 97.7  F (36.5  C) (Oral)   Resp 20   Wt 68.9 kg (152 lb)   SpO2 96%   BMI 23.11 kg/m    Body mass index is 23.11 kg/m .  Physical Exam   GENERAL: healthy, alert and no distress  EYES: Eyes grossly normal to inspection,    HENT: ear canals and TM's scarred - hard of hearing, nose and mouth without ulcers or lesions  NECK: no adenopathy, no asymmetry, masses, or scars and thyroid normal to palpation  RESP: diminished breath sounds bilateral lung fields - no rales, rhonchi or wheezes  CV: regular rate and rhythm, normal S1 S2, no S3 or S4, no murmur, click or rub, no peripheral edema    ABDOMEN: soft, nontender, no hepatosplenomegaly, no masses and bowel sounds normal  MS: limited range of motion bilateral hips, no edema  SKIN: no suspicious lesions or rashes, + multiple seborrheic keratosis on torso  NEURO: Normal strength and tone, mentation intact and speech normal  PSYCH: mentation appears normal, affect normal/bright    Results for orders placed or performed in visit on 07/17/23 (from the past 24 hour(s))   Comprehensive metabolic panel   Result Value Ref Range    Sodium 141 136 - 145 mmol/L    Potassium 4.0 3.4 - 5.3 mmol/L    Chloride 105 98 - 107 mmol/L    Carbon Dioxide (CO2) 26 22 - 29 mmol/L    Anion Gap 10 7 - 15 mmol/L    Urea Nitrogen 25.9 (H) 8.0 - 23.0 mg/dL    Creatinine 1.30 (H) 0.67  - 1.17 mg/dL    Calcium 9.1 8.2 - 9.6 mg/dL    Glucose 109 (H) 70 - 99 mg/dL    Alkaline Phosphatase 116 40 - 129 U/L    AST 14 0 - 45 U/L    ALT 9 0 - 70 U/L    Protein Total 6.7 6.4 - 8.3 g/dL    Albumin 3.5 3.5 - 5.2 g/dL    Bilirubin Total 0.3 <=1.2 mg/dL    GFR Estimate 52 (L) >60 mL/min/1.73m2   CBC with platelets and differential    Narrative    The following orders were created for panel order CBC with platelets and differential.  Procedure                               Abnormality         Status                     ---------                               -----------         ------                     CBC with platelets and d...[[id number]]  Abnormal            Final result                 Please view results for these tests on the individual orders.   Lipase   Result Value Ref Range    Lipase 31 13 - 60 U/L   CBC with platelets and differential   Result Value Ref Range    WBC Count 7.3 4.0 - 11.0 10e3/uL    RBC Count 4.39 (L) 4.40 - 5.90 10e6/uL    Hemoglobin 11.9 (L) 13.3 - 17.7 g/dL    Hematocrit 38.6 (L) 40.0 - 53.0 %    MCV 88 78 - 100 fL    MCH 27.1 26.5 - 33.0 pg    MCHC 30.8 (L) 31.5 - 36.5 g/dL    RDW 13.0 10.0 - 15.0 %    Platelet Count 177 150 - 450 10e3/uL    % Neutrophils 63 %    % Lymphocytes 14 %    % Monocytes 11 %    % Eosinophils 11 %    % Basophils 0 %    % Immature Granulocytes 0 %    Absolute Neutrophils 4.6 1.6 - 8.3 10e3/uL    Absolute Lymphocytes 1.0 0.8 - 5.3 10e3/uL    Absolute Monocytes 0.8 0.0 - 1.3 10e3/uL    Absolute Eosinophils 0.8 (H) 0.0 - 0.7 10e3/uL    Absolute Basophils 0.0 0.0 - 0.2 10e3/uL    Absolute Immature Granulocytes 0.0 <=0.4 10e3/uL     *Note: Due to a large number of results and/or encounters for the requested time period, some results have not been displayed. A complete set of results can be found in Results Review.       Patient Instructions   (R10.13) Abdominal pain, epigastric  (primary encounter diagnosis)  Comment: We will check stat labs today and will  consider imaging pending results.  Given relatively benign clinical exam, and recent CT from [place] 2023, my inclination is to continue to monitor for the next 7-10 days and if not improved consider CT imaging.  Continue famotidine  Plan: Comprehensive metabolic panel, CBC with         platelets and differential, C. difficile Toxin         B PCR with reflex to C. difficile Antigen and         Toxins A/B EIA, Lipase            (R19.5) Loose stools  Comment: Given recent antibiotic treatment I suspect possible C. Diff and will request stool study today  Plan: C. difficile Toxin B PCR with reflex to C.         difficile Antigen and Toxins A/B EIA            (K21.9) Gastroesophageal reflux disease without esophagitis  Comment: Continue famotidine  Plan:     (I48.0) Paroxysmal atrial fib    Comment: noted, no longer treating with apixaban due to bleeding history.  Heart rate is well controlled today  Plan:     (I10) Essential hypertension with goal blood pressure less than 140/90  Comment: blood pressure is also stable off of blood pressure medications   Plan:     (I71.40) Abdominal aortic aneurysm (AAA) without rupture, unspecified part (H)  Comment: noted recent CT showing stability of abdominal aortic aneurysm.  Blood pressure stable  Plan:     (H90.3) Bilateral sensorineural hearing loss  Comment:   Plan: Adult ENT  Referral              Acute Renal insuficiency  Comment; Labs reviewed with patient and wife and recommend push fluids 8x8 oz of water per day and follow up in 2 weeks for blood pressure and lab recheck with primary care physician

## 2023-07-17 NOTE — PATIENT INSTRUCTIONS
(R10.13) Abdominal pain, epigastric  (primary encounter diagnosis)  Comment: We will check stat labs today and will consider imaging pending results.  Given relatively benign clinical exam, and recent CT from [place] 2023, my inclination is to continue to monitor for the next 7-10 days and if not improved consider CT imaging.  Continue famotidine  Plan: Comprehensive metabolic panel, CBC with         platelets and differential, C. difficile Toxin         B PCR with reflex to C. difficile Antigen and         Toxins A/B EIA, Lipase            (R19.5) Loose stools  Comment: Given recent antibiotic treatment I suspect possible C. Diff and will request stool study today  Plan: C. difficile Toxin B PCR with reflex to C.         difficile Antigen and Toxins A/B EIA            (K21.9) Gastroesophageal reflux disease without esophagitis  Comment: Continue famotidine  Plan:     (I48.0) Paroxysmal atrial fib    Comment: noted, no longer treating with apixaban due to bleeding history.  Heart rate is well controlled today  Plan:     (I10) Essential hypertension with goal blood pressure less than 140/90  Comment: blood pressure is also stable off of blood pressure medications   Plan:     (I71.40) Abdominal aortic aneurysm (AAA) without rupture, unspecified part (H)  Comment: noted recent CT showing stability of abdominal aortic aneurysm.  Blood pressure stable  Plan:     (H90.3) Bilateral sensorineural hearing loss  Comment:   Plan: Adult ENT  Referral

## 2023-07-23 NOTE — TELEPHONE ENCOUNTER
Roberta wife is calling and states that he is taking medication for diarrhea.  Patient is currently taking Vancomycin 125 mg capsule and to take 4 times daily for 10 days.  Roberta states that is not showing any improvement.  Hermilo usually has diarrhea 1x daily.  Roberta states that she will contact clinic tomorrow.  Patient is hydrated.        Reason for Disposition   Abdominal pain  (Exception: Pain clears with each passage of diarrhea stool)    Additional Information   Negative: Shock suspected (e.g., cold/pale/clammy skin, too weak to stand, low BP, rapid pulse)   Negative: Difficult to awaken or acting confused (e.g., disoriented, slurred speech)   Negative: Sounds like a life-threatening emergency to the triager   Negative: [1] SEVERE abdominal pain (e.g., excruciating) AND [2] present > 1 hour   Negative: [1] SEVERE abdominal pain AND [2] age > 60 years   Negative: [1] Blood in the stool AND [2] moderate or large amount of blood   Negative: Black or tarry bowel movements (Exception: chronic-unchanged black-grey bowel movements AND is taking iron pills or Pepto-bismol)   Negative: [1] Drinking very little AND [2] dehydration suspected (e.g., no urine > 12 hours, very dry mouth, very lightheaded)   Negative: Patient sounds very sick or weak to the triager   Negative: [1] SEVERE diarrhea (e.g., 7 or more times / day more than normal) AND [2] age > 60 years   Negative: [1] Constant abdominal pain AND [2] present > 2 hours   Negative: [1] Fever > 103 F (39.4 C) AND [2] not able to get the fever down using Fever Care Advice   Negative: [1] SEVERE diarrhea (e.g., 7 or more times / day more than normal) AND [2] present > 24 hours (1 day)   Negative: [1] MODERATE diarrhea (e.g., 4-6 times / day more than normal) AND [2] present > 48 hours (2 days)   Negative: [1] MODERATE diarrhea (e.g., 4-6 times / day more than normal) AND [2] age > 70 years   Negative: Fever > 101 F (38.3 C)   Negative: Fever present > 3 days (72  hours)    Protocols used: Diarrhea-A-AH

## 2023-08-16 NOTE — TELEPHONE ENCOUNTER
"Patient's wife called reporting pt has diarrhea \"thick and dark\" Has abdominal pain and had once episode of vomiting. Pt has hx of c-diff. Per wife pt is weak but able to walk. Triage huddled with ADS provider. Call was transferred to provider.     Reason for Disposition   Constant abdominal pain lasting > 2 hours    Additional Information   Negative: Shock suspected (e.g., cold/pale/clammy skin, too weak to stand, low BP, rapid pulse)   Negative: Difficult to awaken or acting confused (e.g., disoriented, slurred speech)   Negative: Sounds like a life-threatening emergency to the triager   Negative: Vomiting also present and worse than the diarrhea   Negative: Blood in stool and without diarrhea   Negative: SEVERE abdominal pain (e.g., excruciating) and present > 1 hour   Negative: SEVERE abdominal pain and age > 60 years   Negative: Bloody, black, or tarry bowel movements (Exception: chronic-unchanged black-grey bowel movements and is taking iron pills or Pepto-Bismol)   Negative: SEVERE diarrhea (e.g., 7 or more times / day more than normal) and age > 60 years    Protocols used: Diarrhea-A-OH    "

## 2023-08-16 NOTE — PLAN OF CARE
"Discharge Planner PT   Patient plan for discharge: home  Current status: Pt received supine in bed, agreeable to PT, requesting to use the bathroom. O2 noted to be flowing 2L, but patient had removed NC and on RA. Performed supine>sit from flat bed with supervision, needs extra time. SpO2 85 initially sitting EOB, increasing to 88 with cues for PLB. Sit<>stand with FWW and supervision. Ambulated 180' with FWW and supervision, antalgic R with partial step though - step through pattern. SpO2 86 seated post gait, slowly recovering to 88 with ~2' seated rest. Performed 2x platform step training with cues for sequencing and CGA on initial trial, progressing to SBA. Seated rest between step training with SpO2 83 initially, recovering to 88 with ~1' seated rest. Returned to room in WC, SpO2 85 upon arrival to room, 1 L O2 applied, recovering to 87 after ~30\" with 1 L O2. Noted, pt reports his baseline O2 sats at home are typically upper 80s-90 at rest, around 85 after ambulating. Educated pt on importance of rest breaks. Pt seated in WC with 1 Lo2 via NC upon departure of PT   Barriers to return to prior living situation: Currently SBA  Recommendations for discharge: home with assist of wife for SBA with step to enter home   Rationale for recommendations: Anticipate that patient will progress with continued therapy in order for safe discharge home with wife to provide assist for step to enter home.     Physical Therapy Discharge Summary    Reason for therapy discharge:    Discharged to home.    Progress towards therapy goal(s). See goals on Care Plan in [facility] electronic health record for goal details.  Goals partially met.  Barriers to achieving goals:   discharge from facility.    Therapy recommendation(s):    Continue home exercise program.  Ambulation program, encourage rest breaks, PLB              Entered by: Rubina Wolf 05/17/2019 10:57 AM           " Local Anesthesia Pre Procedure Assessment     Informed Consent:     Consent Obtained: Yes     Procedure Assessment:     Preop Diagnosis/Indications for Procedure: Sacroiliac Joint Dysfunction  Planned Procedure: Sacroilliac Injection (ryan)  Planned Anesthetic: Local     Medical History/Comorbid Conditions:     Significant Medical/Surgical History: No  Normal Mental Status: Yes     Examination Pertinent to Procedure Being Performed:     Evaluation of Operative Site: WDL     Other Findings:     Reviewed Current Medications and Allergies: Yes     Pertinent Lab/Diagnostic Tests:     Pertinent Lab / Diagnostic Tests: None        Bolivar Reilly MD  7/28/2023   head trauma/fall

## 2023-08-16 NOTE — TELEPHONE ENCOUNTER
I sent a prescription for vancomycin to [facility] pharmacy.  He should start taking it as directed.  If that does not work well enough for his diarrhea and he feels like he is getting dehydrated, he should go to the emergency department or if possible arrange an ADS consultation so that intravenous fluids can be administered

## 2023-08-16 NOTE — TELEPHONE ENCOUNTER
Writer called and spoke with patient and wife Roberta - patient gave verbal C2C for writer to speak with Roberta.    Reviewed PCP's instructions with Roberta who expressed verbal understanding and is agreeable, will follow up with pharmacy to start medication. Will callback if symptoms not improving/worsening - dehydration etc to coordinate ADS or if severe will go straight to ER.    No further questions or concerns at this time.    Signing encounter.    Luis Mccabe RN  [facility]

## 2023-08-17 PROBLEM — J96.11 CHRONIC RESPIRATORY FAILURE WITH HYPOXIA (H): Status: ACTIVE | Noted: 2023-01-01

## 2023-08-17 PROBLEM — I48.91 ATRIAL FIBRILLATION WITH RAPID VENTRICULAR RESPONSE (H): Status: ACTIVE | Noted: 2023-01-01

## 2023-08-17 PROBLEM — R53.1 GENERALIZED WEAKNESS: Status: ACTIVE | Noted: 2023-01-01

## 2023-08-17 PROBLEM — K52.9 COLITIS: Status: ACTIVE | Noted: 2023-01-01

## 2023-08-17 NOTE — PLAN OF CARE
Goal Outcome Evaluation:      Plan of Care Reviewed With: patient    Overall Patient Progress: no changeOverall Patient Progress: no change    Outcome Evaluation: awaiting IP bed    Pt here with recurrent C. diff with colitis.  Alert, cooperative, oriented but very hard of hearing so at times question & answer do not match. VSS. Tele SR (prior shift reported afib RVR responsive to 5mg IV metoprolol).  Regular diet with thin liquids. Takes pills whole. Up with mod Ax1. Continent of bowel, very loose stool; continent of bladder, will bladder scan after next void (prior shift straight cath'd for 500mL at end of shift). Denies pain. Pt scoring green on the Aggression Stop Light Tool. Plan per ID: would Rx Fidaxomicin but cost prohibitive under medicare (pt has [facility] Medicare); thus ID planning 250mg vancomycin QID with prolonged taper, or stool transplant.  Awaiting inpatient bed.

## 2023-08-17 NOTE — PROGRESS NOTES
RECEIVING UNIT ED HANDOFF REVIEW    ED Nurse Handoff Report was reviewed by: Gabrielle Appiah RN on August 17, 2023 at 6:02 PM

## 2023-08-17 NOTE — PHARMACY-ADMISSION MEDICATION HISTORY
Pharmacist Admission Medication History    Admission medication history is complete. The information provided in this note is only as accurate as the sources available at the time of the update.    Medication reconciliation/reorder completed by provider prior to medication history? No    Information Source(s): Patient, Family member (wife Roberta read off med list), and CareEverywhere/SureScripts via in-person    Pertinent Information: Patient reports Breo was too expensive the last time he filled this so he is not currently taking. No fill history in the last year.    Changes made to PTA medication list:  Added: None  Deleted: None  Changed: Gabapentin 900 mg -> 600 mg    Medication Affordability:  Not including over the counter (OTC) medications, was there a time in the past 3 months when you did not take your medications as prescribed because of cost?: Yes    Allergies reviewed with patient and updates made in EHR: no    Medication History Completed By: Nayana Limon [facility] 8/17/2023 1:54 PM    Prior to Admission medications    Medication Sig Last Dose Taking? Auth Provider Long Term End Date   acetaminophen (TYLENOL) 500 MG tablet Take 1,000 mg by mouth 3 times daily as needed prn Yes Reported, Patient     albuterol (PROAIR HFA/PROVENTIL HFA/VENTOLIN HFA) 108 (90 Base) MCG/ACT inhaler Inhale 2 puffs into the lungs every 6 hours as needed  Yes Reported, Patient Yes    albuterol (PROVENTIL) (2.5 MG/3ML) 0.083% neb solution Take 1 vial (2.5 mg) by nebulization every 6 hours as needed for shortness of breath / dyspnea or wheezing prn Yes Karson Bishop MD Yes    atorvastatin (LIPITOR) 10 MG tablet Take 1 tablet (10 mg) by mouth daily  Yes Michael Donaldson MD Yes    DULoxetine (CYMBALTA) 60 MG capsule Take 60 mg by mouth daily  Yes Reported, Patient No    famotidine (PEPCID) 40 MG tablet Take 1 tablet (40 mg) by mouth 2 times daily  Yes Karson Bishop MD     ferrous sulfate (FE TABS) 325 (65 Fe) MG EC tablet Take 1  tablet (325 mg) by mouth 2 times daily  Yes Reported, Patient     gabapentin (NEURONTIN) 300 MG capsule Take 3 capsules (900 mg) by mouth At Bedtime For neuropathy pain  Patient taking differently: Take 600 mg by mouth At Bedtime For neuropathy pain  Yes Karson Bishop MD Yes    hydrOXYzine (ATARAX) 50 MG tablet Take 50 mg by mouth 2 times daily  Yes Reported, Patient     tamsulosin (FLOMAX) 0.4 MG capsule Take 0.4 mg by mouth daily  Yes Reported, Patient     tiotropium (SPIRIVA) 18 MCG inhaled capsule Inhale 18 mcg into the lungs daily  Yes Unknown, Entered By History No    vancomycin (VANCOCIN) 125 MG capsule Take 1 capsule (125 mg) by mouth 4 times daily 8/16/2023 at x2 Yes Karson Bishop MD     fluticasone-vilanterol (BREO ELLIPTA) 200-25 MCG/ACT inhaler Inhale 1 puff into the lungs daily  Patient not taking: Reported on 8/17/2023 Not Taking  Reported, Patient

## 2023-08-17 NOTE — ED NOTES
Patient up to the commode with the assist of one. Patient is extremely weak and shaky. Recommend using two staff members.

## 2023-08-17 NOTE — H&P
[facility]    History and Physical - Hospitalist Service       Date of Admission:  8/16/2023     Assessment & Plan      Hermilo Velasquez is a 89 year old male with a history of O2 dependent COPD, AORTIC STENOSIS, GERD, peripheral neuopathy who is admitted on 8/16/2023 with recurrent diarrhea after recent 10 day course of vanco for c.diff.     Recurrent C. Diff infection with colitis  Positive test in July and apparent improvement after initial 10 day course of oral vanco, now with likely recurrence. CT shows evidence of colitis, unclear if present prior. Also with mild leukocytosis.   -was planning fidaxomin instead of vanco given recurrence after treatment with vanco  --this medication is restricted so will need ID consult to consider  ---pending ID consult continue po vanco  -could consider adding bezlotoxumab as adjunctive therapy  -follow up enteric panel  -follow up c.diff testing but expect to be positive given recent positive test and treatment    COPD - oxygen dependent on 2L chronically  Small right sided pleural effusion  Mildly hypoxic at home per EMS and had to turn up O2 to 3L. Does not seem to be an active exacerbation and does not seem volume overloaded.  -continue chronic O2 at 2L NC  -continue PTA nebs/inhalers once confirmed    Atrial fibrillation with RVR  Has hx of paroxysmal afib. Denies current symptoms.   -tele  -prn metoprolol  -treat C.diff    Renal insufficiency  Unclear recently baseline as creatine has been around 1.4 on past two checks but was <1 in November 2022.   -monitor    GERD  -Pepcid    Dyslipidemia  -resume statin on discharge    BPH  -flomax    Left lung base nodules - 2 noted incidentally on abd CT  -follow up with PCP to consider 3-6 month repeat CT       Diet:  Regular  DVT Prophylaxis: Pneumatic Compression Devices  Suazo Catheter: Not present  Lines: None     Cardiac Monitoring: None  Code Status:  Full, per wife    Disposition: plan 2-3 days  for treatment of C.diff recurrence    Clinically Significant Risk Factors Present on Admission              # Hypoalbuminemia: Lowest albumin = 3 g/dL at 8/16/2023 11:13 PM, will monitor as appropriate       # Hypertension: Noted on problem list  # Chronic heart failure with preserved ejection fraction: heart failure noted on problem list and last echo with EF >50%                   Charlie Muñiz, DO  Hospitalist Service  [facility]  Securely message with reportbrain (more info)  Text page via [employer] Paging/Directory     ______________________________________________________________________    Chief Complaint   diarrhea    History is obtained from the patient and ED physician    History of Present Illness   Hermilo Velasquez is a 89 year old male who presents from home with recurrent issues with diarrhea. He was having issues with diarrhea in July at which point he saw his PCP and was tested for C.diff which came back positive. He was started on a course of oral vanco x 10 days on 7/17 which he completed and had resolution of his diarrhea. His diarrhea has since returned and so they called the clinic on 8/16 and another script for vanco was sent to the pharmacy. He has taken two doses of this so far. With the ongoing diarrhea the wife was concerned and sent him to the hospital. He denies any chest pain, shortness of breath but does have some mild abdominal pain. No blood in his stool. EKG shows afib, he does have a history of paroxysmal afib.       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 Vasques     Aortic stenosis     Severe AS, 9/2015 AVR - [facility] TRIFECTA Bovine bioprosthesis 25MM TF-25A    Atrial fibrillation (H)     9/2015 Paroxysmal post op Afib - discharged on Warfarin and a beta blocker    COPD (chronic obstructive pulmonary disease) (H)     Deep vein thrombosis (H)     GERD  (gastroesophageal reflux disease)     Heart murmur     Monoclonal gammopathy     plasmacyte prominent causing monoclonal gammopathy    Need for SBE (subacute bacterial endocarditis) prophylaxis     Neuropathy     Other and unspecified hyperlipidemia     Other malignant lymphomas     non hodgkin's lymphoma    RBBB (right bundle branch block)     Severe sepsis with acute organ dysfunction (H) 11/16/2015    Unspecified hereditary and idiopathic peripheral neuropathy        Past Surgical History   Past Surgical History:   Procedure Laterality Date    APPENDECTOMY      ARTHROPLASTY KNEE Right 7/25/2022    Procedure: RIGHT TOTAL KNEE ARTHROPLASTY;  Surgeon: Giuliano Deshpande MD;  Location:  OR    AS TOTAL KNEE ARTHROPLASTY      BACK SURGERY      ESOPHAGOSCOPY, GASTROSCOPY, DUODENOSCOPY (EGD), COMBINED N/A 11/28/2015    Procedure: COMBINED ESOPHAGOSCOPY, GASTROSCOPY, DUODENOSCOPY (EGD);  Surgeon: Danis Castillo MD;  Location:  GI    ESOPHAGOSCOPY, GASTROSCOPY, DUODENOSCOPY (EGD), COMBINED N/A 7/26/2018    Procedure: COMBINED ESOPHAGOSCOPY, GASTROSCOPY, DUODENOSCOPY (EGD);;  Surgeon: Toby Dong DO;  Location:  GI    ESOPHAGOSCOPY, GASTROSCOPY, DUODENOSCOPY (EGD), COMBINED N/A 8/17/2020    Procedure: ESOPHAGOGASTRODUODENOSCOPY (EGD);  Surgeon: Herrera Henry MD;  Location:  GI    ESOPHAGOSCOPY, GASTROSCOPY, DUODENOSCOPY (EGD), COMBINED N/A 10/24/2020    Procedure: ESOPHAGOGASTRODUODENOSCOPY (EGD);  Surgeon: Vick Florentino MD;  Location:  GI    ESOPHAGOSCOPY, GASTROSCOPY, DUODENOSCOPY (EGD), COMBINED N/A 4/11/2022    Procedure: ESOPHAGOGASTRODUODENOSCOPY (EGD);  Surgeon: Vick Florentino MD;  Location:  GI    ESOPHAGOSCOPY, GASTROSCOPY, DUODENOSCOPY (EGD), COMBINED N/A 8/26/2022    Procedure: ESOPHAGOGASTRODUODENOSCOPY (EGD);  Surgeon: El Mcgovern MD;  Location:  GI    HERNIA REPAIR  2006    HERNIORRHAPHY VENTRAL  4/17/2013    Procedure: HERNIORRHAPHY VENTRAL;  VENTRAL  HERNIA REPAIR WITH MESH;  Surgeon: Patel Guzman MD;  Location: [facility] OR    KNEE SURGERY      arthroscopic right knee surgery     LOBECTOMY LUNG Right 3/26/2019    POSSIBLE LOBECTOMY LUNG per Dr. Vasques at [facility]    REPAIR LIGAMENT ANKLE  2/23/2012    Procedure:REPAIR LIGAMENT ANKLE; LEFT TARSAL TUNNEL RELEASE OF KNOT OF ARIEL RELEASE; Surgeon:SAUL PUENTE; Location:[facility] OR    REPLACE VALVE AORTIC N/A 9/3/2015    Procedure: REPLACE VALVE AORTIC;  Surgeon: Antonino Mitchell MD;  Location: [facility] OR    ROTATOR CUFF REPAIR RT/LT      bilateral    SPINE SURGERY      3 spine surgeries    THORACOTOMY, WEDGE RESECTION LUNG, COMBINED Right 3/26/2019    RIGHT THORACOTOMY, WEDGE RESECTION, RIGHT LOWER LOBE LUNG NODULE,;  Surgeon: Jose A Vasques MD;  Location: [facility] OR    TONSILLECTOMY      TURP         Prior to Admission Medications   Prior to Admission Medications   Prescriptions Last Dose Informant Patient Reported? Taking?   DULoxetine (CYMBALTA) 60 MG capsule   Yes No   Sig: Take 60 mg by mouth daily   acetaminophen (TYLENOL) 500 MG tablet  Self Yes No   Sig: Take 1,000 mg by mouth 3 times daily as needed   albuterol (PROAIR HFA/PROVENTIL HFA/VENTOLIN HFA) 108 (90 Base) MCG/ACT inhaler  Self Yes No   Sig: Inhale 2 puffs into the lungs every 6 hours as needed   albuterol (PROVENTIL) (2.5 MG/3ML) 0.083% neb solution  Self No No   Sig: Take 1 vial (2.5 mg) by nebulization every 6 hours as needed for shortness of breath / dyspnea or wheezing   atorvastatin (LIPITOR) 10 MG tablet   No No   Sig: Take 1 tablet (10 mg) by mouth daily   famotidine (PEPCID) 40 MG tablet   No No   Sig: Take 1 tablet (40 mg) by mouth 2 times daily   ferrous sulfate (FE TABS) 325 (65 Fe) MG EC tablet  Self Yes No   Sig: Take 1 tablet (325 mg) by mouth 2 times daily   fluticasone-vilanterol (BREO ELLIPTA) 200-25 MCG/ACT inhaler   Yes No   Sig: Inhale 1 puff into the lungs daily   gabapentin (NEURONTIN) 300 MG capsule   No No    Sig: Take 3 capsules (900 mg) by mouth At Bedtime For neuropathy pain   hydrOXYzine (ATARAX) 50 MG tablet   Yes No   Sig: Take 50 mg by mouth 2 times daily   tamsulosin (FLOMAX) 0.4 MG capsule   Yes No   Sig: Take 0.4 mg by mouth daily   tiotropium (SPIRIVA) 18 MCG inhaled capsule  Self Yes No   Sig: Inhale 18 mcg into the lungs daily   vancomycin (VANCOCIN) 125 MG capsule   No No   Sig: Take 1 capsule (125 mg) by mouth 4 times daily      Facility-Administered Medications: None        Review of Systems    The 10 point Review of Systems is negative other than noted in the HPI or here.      Physical Exam   Vital Signs: Temp: 98.7  F (37.1  C) Temp src: Oral BP: 134/75 Pulse: 115   Resp: 29 SpO2: 97 %      Weight: 0 lbs 0 oz    Gen: lying in bed, appears comfortable  CV: Irregular, mildly tachycardic  Pulm: Mild wheeze at the bilateral bases, no crackles  GI: +BS, soft mild tenderness LLQ    Medical Decision Making             Data     I have personally reviewed the following data over the past 24 hrs:    11.8 (H)  \   11.1 (L)   / 150     135 (L) 100 21.6 /  133 (H)   4.0 25 1.44 (H) \     ALT: 7 AST: 11 AP: 103 TBILI: 0.4   ALB: 3.0 (L) TOT PROTEIN: 5.8 (L) LIPASE: 14     Trop: N/A BNP: 4,535 (H)     Procal: N/A CRP: N/A Lactic Acid: 0.6         Imaging results reviewed over the past 24 hrs:   Recent Results (from the past 24 hour(s))   XR Chest 2 Views    Narrative    EXAM: XR CHEST 2 VIEWS  LOCATION: [facility]  DATE: 8/16/2023    INDICATION: SOB, fever.  COMPARISON: CT chest, abdomen and pelvis without IV contrast 03/13/2023.      Impression    IMPRESSION: Sternotomy and aortic valve replacement. Normal cardiac size. Slight venous congestion. A few strands of linear atelectasis in the lower lungs. Postoperative changes right lower lobe. Tiny right pleural effusion. No effusion on the left.   Atherosclerotic thoracic aorta. Degenerative changes both shoulders and the thoracic spine.   CT  Abdomen Pelvis w/o Contrast    Narrative    EXAM: CT ABDOMEN AND PELVIS WITHOUT CONTRAST  LOCATION: [facility]  DATE/TIME: 8/17/2023 1:58 AM CDT    INDICATION: Recent treatment for C. difficile colitis. Abdominal pain and diarrhea.  COMPARISON: 03/13/2023 - CT chest, abdomen and pelvis.    TECHNIQUE: CT scan of the abdomen and pelvis was performed without IV contrast. Multiplanar reformats were obtained. Dose reduction techniques were used.  CONTRAST: None.    FINDINGS:    LOWER CHEST: Mild emphysematous changes in the lung bases. 0.7 x 0.5 cm nodule in the posterior aspect of the left lung base (series 3 image 9) and 0.4 cm nodule in the posterolateral aspect of the left lung base (series 3 image 6), not visualized on   03/13/2023. Tiny loculated-appearing right pleural effusion again noted. Prior median sternotomy.    HEPATOBILIARY: A few subcentimeter low-attenuation foci scattered within the liver, too small to characterize.    SPLEEN: Unremarkable.    PANCREAS: Mild diffuse atrophy.    ADRENAL GLANDS: Unremarkable.    KIDNEYS/BLADDER: 0.3 cm vascular calcification versus nonobstructing calculus in the upper pole of the left kidney.    BOWEL: No dilatation of the small or large bowel. Apparent mild wall thickening of the descending colon through the rectum. Additionally, there is slight haziness within the fat about the thick-walled colon. The appendix is not visualized. No pneumatosis   or free intraperitoneal gas.    LYMPH NODES: Unremarkable.    PELVIC ORGANS: Mild to moderate enlargement of the prostate gland.    MUSCULOSKELETAL: No acute findings.    OTHER: Atherosclerotic calcification in the abdominal aorta. 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      Impression    IMPRESSION:   1. Apparent mild wall thickening of the descending colon through the rectum in addition to slight haziness in the fat about the  thick-walled colon. This is suggestive of infectious or inflammatory colitis.  2. No other acute abnormality identified in the abdomen or pelvis.  3. Two small nodules in the left lung base, not visualized on 03/13/2023. A follow-up CT scan of the chest would be useful in 3-6 months for reevaluation.

## 2023-08-17 NOTE — CONSULTS
[facility]    Infectious Disease Consultation     Date of Admission:  8/16/2023  Date of Consult (When I saw the patient): 08/17/23    Assessment & Plan   Hermilo Velasquez is a 89 year old who was admitted on 8/16/2023.     Impression: 1 9-year-old male with acute relapsed C. difficile colitis, rapid relapse after recent 10-day Vanco course, positive stool, leukocytosis and fever imaging without toxic megacolon.  2 COPD  3 atrial fibrillation  4 chronic kidney disease    REC 1 2 valid choice of treatment here, would have no objection to fidaxomicin but unfortunately his preoperative cost will be a major issue here, patient has Medicare so support programs not available and does not have very good prescription coverage thus the better choice here is vancomycin prolonged taper in hospital to 50 4 times daily seen this is relatively severe and his age, at discharge 125 4 times daily for 2 weeks then 125 twice daily for 2 weeks then 125 daily for 4 weeks then 125 every other day for 4 weeks then stop and watch ready to restart immediately if diarrhea relapses.  At that point consideration will be a stool transplant as the next alternative  2 avoid antibiotics is much as possible going forward for the next several months        Cecil Finch MD    Reason for Consult   Reason for consult: I was asked to evaluate this patient for C. difficile colitis.    Primary Care Physician   Karson Bishop    Chief Complaint   Diarrhea    History is obtained from the patient and medical records    History of Present Illness   Hermilo Velasquez is a 89 year old male who presents with acute worsening diarrhea.  Colitis on imaging slight leukocytosis and fever.  Had recent C. difficile colitis had a 10-day course of vancomycin and responded very well to it with full resolution of diarrhea.  On stopping has had rapid recurrence of diarrhea and C. difficile is positive.  No prior history of C. difficile.  Has  had some antibiotics looks like either COPD or UTI related that preceded this.    Past Medical History   I have reviewed this patient's medical history and updated it with pertinent information if needed.   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 Vasques     Aortic stenosis     Severe AS, 9/2015 AVR - [facility] TRIFECTA Bovine bioprosthesis 25MM TF-25A    Atrial fibrillation (H)     9/2015 Paroxysmal post op Afib - discharged on Warfarin and a beta blocker    COPD (chronic obstructive pulmonary disease) (H)     Deep vein thrombosis (H)     GERD (gastroesophageal reflux disease)     Heart murmur     Monoclonal gammopathy     plasmacyte prominent causing monoclonal gammopathy    Need for SBE (subacute bacterial endocarditis) prophylaxis     Neuropathy     Other and unspecified hyperlipidemia     Other malignant lymphomas     non hodgkin's lymphoma    RBBB (right bundle branch block)     Severe sepsis with acute organ dysfunction (H) 11/16/2015    Unspecified hereditary and idiopathic peripheral neuropathy        Past Surgical History   I have reviewed this patient's surgical history and updated it with pertinent information if needed.  Past Surgical History:   Procedure Laterality Date    APPENDECTOMY      ARTHROPLASTY KNEE Right 7/25/2022    Procedure: RIGHT TOTAL KNEE ARTHROPLASTY;  Surgeon: Giuliano Deshpande MD;  Location:  OR    AS TOTAL KNEE ARTHROPLASTY      BACK SURGERY      ESOPHAGOSCOPY, GASTROSCOPY, DUODENOSCOPY (EGD), COMBINED N/A 11/28/2015    Procedure: COMBINED ESOPHAGOSCOPY, GASTROSCOPY, DUODENOSCOPY (EGD);  Surgeon: Danis Castillo MD;  Location:  GI    ESOPHAGOSCOPY, GASTROSCOPY, DUODENOSCOPY (EGD), COMBINED N/A 7/26/2018    Procedure: COMBINED ESOPHAGOSCOPY, GASTROSCOPY, DUODENOSCOPY (EGD);;  Surgeon: Toby Dong DO;  Location:  GI    ESOPHAGOSCOPY, GASTROSCOPY, DUODENOSCOPY (EGD), COMBINED N/A 8/17/2020     Procedure: ESOPHAGOGASTRODUODENOSCOPY (EGD);  Surgeon: Herrera Henry MD;  Location:  GI    ESOPHAGOSCOPY, GASTROSCOPY, DUODENOSCOPY (EGD), COMBINED N/A 10/24/2020    Procedure: ESOPHAGOGASTRODUODENOSCOPY (EGD);  Surgeon: Vick Florentino MD;  Location:  GI    ESOPHAGOSCOPY, GASTROSCOPY, DUODENOSCOPY (EGD), COMBINED N/A 4/11/2022    Procedure: ESOPHAGOGASTRODUODENOSCOPY (EGD);  Surgeon: Vick Florentino MD;  Location:  GI    ESOPHAGOSCOPY, GASTROSCOPY, DUODENOSCOPY (EGD), COMBINED N/A 8/26/2022    Procedure: ESOPHAGOGASTRODUODENOSCOPY (EGD);  Surgeon: El Mcgovern MD;  Location:  GI    HERNIA REPAIR  2006    HERNIORRHAPHY VENTRAL  4/17/2013    Procedure: HERNIORRHAPHY VENTRAL;  VENTRAL HERNIA REPAIR WITH MESH;  Surgeon: Patel Guzman MD;  Location:  OR    KNEE SURGERY      arthroscopic right knee surgery     LOBECTOMY LUNG Right 3/26/2019    POSSIBLE LOBECTOMY LUNG per Dr. Vasques at [facility]    REPAIR LIGAMENT ANKLE  2/23/2012    Procedure:REPAIR LIGAMENT ANKLE; LEFT TARSAL TUNNEL RELEASE OF KNOT OF ARIEL RELEASE; Surgeon:SAUL PUENTE; Location: OR    REPLACE VALVE AORTIC N/A 9/3/2015    Procedure: REPLACE VALVE AORTIC;  Surgeon: Antonino Mitchell MD;  Location:  OR    ROTATOR CUFF REPAIR RT/LT      bilateral    SPINE SURGERY      3 spine surgeries    THORACOTOMY, WEDGE RESECTION LUNG, COMBINED Right 3/26/2019    RIGHT THORACOTOMY, WEDGE RESECTION, RIGHT LOWER LOBE LUNG NODULE,;  Surgeon: Jose A Vasques MD;  Location:  OR    TONSILLECTOMY      TURP         Prior to Admission Medications   Prior to Admission Medications   Prescriptions Last Dose Informant Patient Reported? Taking?   DULoxetine (CYMBALTA) 60 MG capsule   Yes No   Sig: Take 60 mg by mouth daily   acetaminophen (TYLENOL) 500 MG tablet  Self Yes No   Sig: Take 1,000 mg by mouth 3 times daily as needed   albuterol (PROAIR HFA/PROVENTIL HFA/VENTOLIN HFA) 108 (90 Base) MCG/ACT  "inhaler  Self Yes No   Sig: Inhale 2 puffs into the lungs every 6 hours as needed   albuterol (PROVENTIL) (2.5 MG/3ML) 0.083% neb solution  Self No No   Sig: Take 1 vial (2.5 mg) by nebulization every 6 hours as needed for shortness of breath / dyspnea or wheezing   atorvastatin (LIPITOR) 10 MG tablet   No No   Sig: Take 1 tablet (10 mg) by mouth daily   famotidine (PEPCID) 40 MG tablet   No No   Sig: Take 1 tablet (40 mg) by mouth 2 times daily   ferrous sulfate (FE TABS) 325 (65 Fe) MG EC tablet  Self Yes No   Sig: Take 1 tablet (325 mg) by mouth 2 times daily   fluticasone-vilanterol (BREO ELLIPTA) 200-25 MCG/ACT inhaler   Yes No   Sig: Inhale 1 puff into the lungs daily   gabapentin (NEURONTIN) 300 MG capsule   No No   Sig: Take 3 capsules (900 mg) by mouth At Bedtime For neuropathy pain   hydrOXYzine (ATARAX) 50 MG tablet   Yes No   Sig: Take 50 mg by mouth 2 times daily   tamsulosin (FLOMAX) 0.4 MG capsule   Yes No   Sig: Take 0.4 mg by mouth daily   tiotropium (SPIRIVA) 18 MCG inhaled capsule  Self Yes No   Sig: Inhale 18 mcg into the lungs daily   vancomycin (VANCOCIN) 125 MG capsule   No No   Sig: Take 1 capsule (125 mg) by mouth 4 times daily      Facility-Administered Medications: None     Allergies   Allergies   Allergen Reactions    Omeprazole Itching    Pantoprazole Itching    Prevacid [Lansoprazole] Itching    Lasix [Furosemide] Rash    Lidocaine Blisters and Rash     Allergy to lidocaine ointment  Allergy to lidocaine ointment      Penicillin G Rash    Penicillins Rash     \"broke out from injection\" 60 yrs ago  Tolerates cephalosporins       Immunization History   Immunization History   Administered Date(s) Administered    COVID-19 Bivalent 12+ (Pfizer) 11/23/2022, 06/20/2023    COVID-19 MONOVALENT 12+ (Pfizer) 01/20/2021, 02/10/2021, 10/15/2021    COVID-19 Monovalent 12+ (Pfizer 2022) 04/26/2022    DT (PEDS <7y) 07/25/1996    Flu, Unspecified 02/01/2023    Influenza (H1N1) 01/20/2010    Influenza " (High Dose) 3 valent vaccine 11/03/2014, 10/01/2015, 10/01/2016, 11/08/2016, 11/02/2017, 12/10/2018, 10/11/2019    Influenza (IIV3) PF 09/23/2009, 10/01/2019    Influenza Vaccine 65+ (Fluzone HD) 10/15/2020, 09/28/2021, 11/23/2022    Pneumo Conj 13-V (2010&after) 11/08/2014    Pneumococcal 23 valent 07/01/2014    TD,PF 7+ (Tenivac) 06/01/2012    TDAP Vaccine (Adacel) 06/01/2012    Td (Adult), Adsorbed 06/01/2012    Zoster recombinant adjuvanted (SHINGRIX) 12/10/2018, 07/21/2019    Zoster vaccine, live 07/01/2014, 07/01/2019       Social History   I have reviewed this patient's social history and updated it with pertinent information if needed. Hermilo Velasquez  reports that he quit smoking about 25 years ago. His smoking use included cigarettes. He has a 110.00 pack-year smoking history. He has never used smokeless tobacco. He reports that he does not currently use alcohol. He reports that he does not use drugs.    Family History   I have reviewed this patient's family history and updated it with pertinent information if needed.   Family History   Problem Relation Age of Onset    C.A.D. Father     Emphysema Father     Melanoma No family hx of     Skin Cancer No family hx of        Review of Systems   The 10 point Review of Systems is negative except for the diarrhea and slightly decreased appetite    Physical Exam   Temp: (!) 101.2  F (38.4  C) Temp src: Oral BP: 114/69 Pulse: 103   Resp: 24 SpO2: 92 %      Vital Signs with Ranges  Temp:  [98.7  F (37.1  C)-101.2  F (38.4  C)] 101.2  F (38.4  C)  Pulse:  [] 103  Resp:  [16-30] 24  BP: (101-138)/(67-81) 114/69  SpO2:  [91 %-100 %] 92 %  0 lbs 0 oz  There is no height or weight on file to calculate BMI.    GENERAL APPEARANCE:  awake  EYES: Eyes grossly normal to inspection  NECK: no adenopathy  RESP: lungs clear   CV: regular rates and rhythm  LYMPHATICS: normal ant/post cervical and supraclavicular nodes  ABDOMEN: soft, nontender  MS: extremities  normal  SKIN: no suspicious lesions or rashes        Data   All laboratory and imaging data in the past 24 hours reviewed  No results for input(s): CULT in the last 168 hours.  Recent Labs   Lab Test 04/14/21  0815 04/13/21 2054 04/13/21 2038 09/13/20  1329 09/13/20  1256 08/15/20  1451 03/22/20  1950 03/22/20  1941 03/22/20  1935   CULT Canceled, Test credited  >10 Squamous epithelial cells/low power field indicates oral contamination. Please   recollect.  *  Notification of test cancellation was given to  Elva Ospina RN [id number] 4/14/20 by AM   No growth No growth No growth No growth No growth  No growth No growth Cultured on the 4th day of incubation:  Staphylococcus capitis  Identification obtained by MALDI-TOF mass spectrometry research use only database. Test   characteristics determined and verified by the [facility]   ([facility]) [place], [place].  *  Critical Value/Significant Value, preliminary result only, called to and read back by  ANNABELLE CABRERA RN  03.26.20 CF    (Note)  POSITIVE for Staphylococci other than S.aureus, S.epidermidis and  S.lugdunensis, by Verigene multiplex nucleic acid test.  Coagulase-negative staphylococci are the most common venipuncture or  collection associated skin CONTAMINANTS grown in blood cultures.  Final identification and antimicrobial susceptibility testing will be  verified by standard methods.    Specimen tested with Verigene multiplex, gram-positive blood culture  nucleic acid test for the following targets: Staph aureus, Staph  epidermidis, Staph lugdunensis, other Staph species, Enterococcus  faecalis, Enterococcus faecium, Streptococcus species, S. agalactiae,  S. anginosus grp., S. pneumoniae, S. pyogenes, Listeria sp., mecA  (methicillin resistance) and Samanta/B (vancomycin resistance).    Critical Value/Significant Value called to and read back by Doreen Silverman RN on 3.26.20 at 0727. bw   No growth          All cultures:  No  results for input(s): CULTURE in the last 168 hours.   Blood culture:  Results for orders placed or performed during the hospital encounter of 11/06/22   Blood Culture Wrist, Left    Specimen: Wrist, Left; Blood   Result Value Ref Range    Culture No Growth    Blood Culture Peripheral Blood    Specimen: Peripheral Blood   Result Value Ref Range    Culture No Growth    Results for orders placed or performed during the hospital encounter of 10/09/22   Blood Culture Peripheral Blood    Specimen: Peripheral Blood   Result Value Ref Range    Culture No Growth    Blood Culture Peripheral Blood    Specimen: Peripheral Blood   Result Value Ref Range    Culture No Growth    Results for orders placed or performed during the hospital encounter of 04/10/22   Blood Culture Line, venous    Specimen: Line, venous; Blood   Result Value Ref Range    Culture No Growth    Blood Culture Peripheral Blood    Specimen: Peripheral Blood   Result Value Ref Range    Culture No Growth    Results for orders placed or performed during the hospital encounter of 04/13/21   Blood culture    Specimen: Blood    Left Arm   Result Value Ref Range    Specimen Description Blood Left Arm     Culture Micro No growth    Blood culture    Specimen: Blood    Right Arm   Result Value Ref Range    Specimen Description Blood Right Arm     Culture Micro No growth    Results for orders placed or performed during the hospital encounter of 09/13/20   Blood culture    Specimen: Blood    Right Arm   Result Value Ref Range    Specimen Description Blood Right Arm     Culture Micro No growth    Blood culture    Specimen: Blood    Left Arm   Result Value Ref Range    Specimen Description Blood Left Arm     Culture Micro No growth    Results for orders placed or performed during the hospital encounter of 08/15/20   Blood culture    Specimen: Blood    Left Arm   Result Value Ref Range    Specimen Description Blood Left Arm     Culture Micro No growth    Blood culture     Specimen: Blood    Right Arm   Result Value Ref Range    Specimen Description Blood Right Arm     Culture Micro No growth    Results for orders placed or performed during the hospital encounter of 03/22/20   Blood culture    Specimen: Blood    Right Arm   Result Value Ref Range    Specimen Description Blood Right Arm     Culture Micro (A)      Cultured on the 4th day of incubation:  Staphylococcus capitis  Identification obtained by MALDI-TOF mass spectrometry research use only database. Test   characteristics determined and verified by the [facility] Diagnostic Laboratory   ([facility]) [place], [place].      Culture Micro       Critical Value/Significant Value, preliminary result only, called to and read back by  ANNABELLE CABRERA RN  03.26.20 CF      Culture Micro       (Note)  POSITIVE for Staphylococci other than S.aureus, S.epidermidis and  S.lugdunensis, by Verigene multiplex nucleic acid test.  Coagulase-negative staphylococci are the most common venipuncture or  collection associated skin CONTAMINANTS grown in blood cultures.  Final identification and antimicrobial susceptibility testing will be  verified by standard methods.    Specimen tested with Verigene multiplex, gram-positive blood culture  nucleic acid test for the following targets: Staph aureus, Staph  epidermidis, Staph lugdunensis, other Staph species, Enterococcus  faecalis, Enterococcus faecium, Streptococcus species, S. agalactiae,  S. anginosus grp., S. pneumoniae, S. pyogenes, Listeria sp., mecA  (methicillin resistance) and Samanta/B (vancomycin resistance).    Critical Value/Significant Value called to and read back by Doreen Silverman RN on 3.26.20 at 0727. bw         Susceptibility    Staphylococcus capitis - SOCRATES     Ciprofloxacin <=0.5 Susceptible ug/mL     Clindamycin <=0.25 Susceptible ug/mL     Erythromycin 0.5 Susceptible ug/mL     Gentamicin <=0.5 Susceptible ug/mL     Levofloxacin 0.5 Susceptible ug/mL     Oxacillin <=0.25  Susceptible ug/mL     Tetracycline <=1 Susceptible ug/mL     Vancomycin <=0.5 Susceptible ug/mL   Blood culture    Specimen: Blood    Left Arm   Result Value Ref Range    Specimen Description Blood Left Arm     Culture Micro No growth    Results for orders placed or performed during the hospital encounter of 12/22/19   Blood culture    Specimen: Blood    Right Arm   Result Value Ref Range    Specimen Description Blood Right Arm     Special Requests Aerobic and anaerobic bottles received     Culture Micro (A)      Cultured on the 2nd day of incubation:  Staphylococcus hominis      Culture Micro       Critical Value/Significant Value, preliminary result only, called to and read back by  DAVID GARCIA RN @101 12/24/19. SCG      Culture Micro       (Note)  POSITIVE for Staphylococci other than S.aureus, S.epidermidis and  S.lugdunensis, by [employer]igene multiplex nucleic acid test.  Coagulase-negative staphylococci are the most common venipuncture or  collection associated skin CONTAMINANTS grown in blood cultures.  Final identification and antimicrobial susceptibility testing will be  verified by standard methods.    Specimen tested with Verigene multiplex, gram-positive blood culture  nucleic acid test for the following targets: Staph aureus, Staph  epidermidis, Staph lugdunensis, other Staph species, Enterococcus  faecalis, Enterococcus faecium, Streptococcus species, S. agalactiae,  S. anginosus grp., S. pneumoniae, S. pyogenes, Listeria sp., mecA  (methicillin resistance) and Samanta/B (vancomycin resistance).    Critical Value/Significant Value called to and read back by Aurora Giron RN at 2402 12.24.19.DK         Susceptibility    Staphylococcus hominis - SOCRATES     Ciprofloxacin <=0.5 Susceptible ug/mL     Clindamycin* <=0.25 Susceptible ug/mL      * This isolate DOES NOT demonstrate inducible clindamycin resistance in vitro. Clindamycin is susceptible and could be used when indicated, however, erythromycin is  resistant and should not be used.     Erythromycin >=8 Resistant ug/mL     Gentamicin <=0.5 Susceptible ug/mL     Levofloxacin <=0.12 Susceptible ug/mL     Oxacillin <=0.25 Susceptible ug/mL     Tetracycline 2 Susceptible ug/mL     Vancomycin <=0.5 Susceptible ug/mL   Blood culture    Specimen: Blood    Right Hand   Result Value Ref Range    Specimen Description Blood Right Hand     Special Requests Aerobic and anaerobic bottles received     Culture Micro No growth    Results for orders placed or performed during the hospital encounter of 05/13/19   Blood culture    Specimen: Arm, Left; Blood    Left Arm   Result Value Ref Range    Specimen Description Blood Left Arm     Special Requests Aerobic and anaerobic bottles received     Culture Micro No growth    Blood culture    Specimen: Arm, Left; Blood    Left Arm   Result Value Ref Range    Specimen Description Blood Left Arm     Special Requests Aerobic and anaerobic bottles received     Culture Micro No growth      *Note: Due to a large number of results and/or encounters for the requested time period, some results have not been displayed. A complete set of results can be found in Results Review.      Urine culture:  Results for orders placed or performed during the hospital encounter of 10/09/22   Urine Culture    Specimen: Urine, Midstream   Result Value Ref Range    Culture No Growth    Results for orders placed or performed during the hospital encounter of 04/10/22   Urine Culture    Specimen: Urine, Catheter   Result Value Ref Range    Culture No Growth    Results for orders placed or performed during the hospital encounter of 03/22/20   Urine Culture    Specimen: Catheterized Urine   Result Value Ref Range    Specimen Description Catheterized Urine     Special Requests Specimen received in preservative     Culture Micro No growth      *Note: Due to a large number of results and/or encounters for the requested time period, some results have not been displayed.  A complete set of results can be found in Results Review.

## 2023-08-17 NOTE — PROGRESS NOTES
Patient admitted early hours of this morning.  See excellent H&P by Dr. Muñiz    Discussed with RN-no new concerns    ID note reviewed and plan to continue on vancomycin for now.    Nonbillable visit

## 2023-08-17 NOTE — ED NOTES
Bed: ED29  Expected date:   Expected time:   Means of arrival:   Comments:  HEMS 451 89M fever, sob eta 4505

## 2023-08-17 NOTE — ED NOTES
Small skin tear noted on patients left forearm by lab. RN placed non-adhesive dressing on it with coban.

## 2023-08-17 NOTE — ED TRIAGE NOTES
Pt BIBA for increased WOB and SOB per wife. Pt has COPD and is chronically on 4L NC. EMS found pt in the lower 80's on his baseline 02. He is currently on a NRB at 6 L at 98%. Wife states patient has been ill this week with diarrhea and generalized weakness. He has a hx of Cdiff as well.

## 2023-08-17 NOTE — ED NOTES
"[facility]  ED Nurse Handoff Report    ED Chief complaint: Shortness of Breath      ED Diagnosis:   Final diagnoses:   Pulmonary nodules   Colitis   Chronic respiratory failure with hypoxia (H)   Generalized weakness   Atrial fibrillation with rapid ventricular response (H)       Code Status: Full Code    Allergies:   Allergies   Allergen Reactions    Omeprazole Itching    Pantoprazole Itching    Prevacid [Lansoprazole] Itching    Lasix [Furosemide] Rash    Lidocaine Blisters and Rash     Allergy to lidocaine ointment  Allergy to lidocaine ointment      Penicillin G Rash    Penicillins Rash     \"broke out from injection\" 60 yrs ago  Tolerates cephalosporins       Patient Story: Pt BIBA from home for increased weakness and SOB. He has 02 dependent COPD normally on 3 L. EMS found him to be in the low 80's for oxygen, but is now back to his baseline 3 L NC saturating 93%.  He has reoccurring diarrhea even after completing a 10 day course of vanco for Cdiff. Hx of Afib and is currently in RVR.   Focused Assessment:  Afib RVR in the 120's, loose and watery stools consistent with Cdiff    Treatments and/or interventions provided: Stool sample sent, patient on 3L NC   Patient's response to treatments and/or interventions: Pt resting comfortably    To be done/followed up on inpatient unit:  See inpatient orders    Does this patient have any cognitive concerns?: Forgetful    Activity level - Baseline/Home:  Independent  Activity Level - Current:   Stand with assist x2, patient very weak    Patient's Preferred language: English   Needed?: No    Isolation: None and Enteric  Infection: Not Applicable  C-Diff (Clostridium Difficile) diagnosis  Patient tested for COVID 19 prior to admission: YES  Bariatric?: No    Vital Signs:   Vitals:    08/17/23 0357 08/17/23 0402 08/17/23 0432 08/17/23 0447   BP:  113/69 101/77    Pulse: 115 118  (!) 123   Resp: 29 22  19   Temp:       TempSrc:       SpO2: 97% 96%  " 91%       Cardiac Rhythm:     Was the PSS-3 completed:   Yes  What interventions are required if any?               Family Comments: None present, wife updated  OBS brochure/video discussed/provided to patient/family: N/A              Name of person given brochure if not patient:               Relationship to patient:     For the majority of the shift this patient's behavior was Green.   Behavioral interventions performed were None needed.    ED NURSE PHONE NUMBER: *[phone]

## 2023-08-17 NOTE — ED PROVIDER NOTES
History     Chief Complaint:  Shortness of Breath       HPI   Hermilo Velasquez is a 89 year old male who presented to the emergency department with shortness of breath.  Patient denies any complaints right now.  He denies any abdominal pain.  Denies any shortness of breath.  Reports that he did have diarrhea at home.  EMS reports that he was hypoxic on his 3 L of oxygen so they increased the oxygen.    Independent Historian:   Spouse/Partner - They report diarrhea yesterday.  Called the clinic and was prescribed oral vancomycin.  Has taken 2 doses of vancomycin.  Wife called EMS because he was so weak this evening and she could not get him up.  She has not heard him complaining of shortness of breath or chest pain.  He has not had a fever that she knows of.  She reports that the diarrhea did improve/resolve after first treatment with vancomycin for C. difficile colitis in July    Review of External Notes:   Reviewed the clinic note from July regarding his diarrhea.  I do see that he was put on oral vancomycin for that diagnosis.  I see phone conversation where he was prescribed vancomycin again earlier today.    Medications:    acetaminophen (TYLENOL) 500 MG tablet  albuterol (PROAIR HFA/PROVENTIL HFA/VENTOLIN HFA) 108 (90 Base) MCG/ACT inhaler  albuterol (PROVENTIL) (2.5 MG/3ML) 0.083% neb solution  atorvastatin (LIPITOR) 10 MG tablet  DULoxetine (CYMBALTA) 60 MG capsule  famotidine (PEPCID) 40 MG tablet  ferrous sulfate (FE TABS) 325 (65 Fe) MG EC tablet  fluticasone-vilanterol (BREO ELLIPTA) 200-25 MCG/ACT inhaler  gabapentin (NEURONTIN) 300 MG capsule  hydrOXYzine (ATARAX) 50 MG tablet  tamsulosin (FLOMAX) 0.4 MG capsule  tiotropium (SPIRIVA) 18 MCG inhaled capsule  vancomycin (VANCOCIN) 125 MG capsule      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    Aortic stenosis     Atrial fibrillation (H)     COPD (chronic obstructive pulmonary  disease) (H)     Deep vein thrombosis (H)     GERD (gastroesophageal reflux disease)     Heart murmur     Monoclonal gammopathy     Need for SBE (subacute bacterial endocarditis) prophylaxis     Neuropathy     Other and unspecified hyperlipidemia     Other malignant lymphomas     RBBB (right bundle branch block)     Severe sepsis with acute organ dysfunction (H) 11/16/2015    Unspecified hereditary and idiopathic peripheral neuropathy        Past Surgical History:    Past Surgical History:   Procedure Laterality Date    APPENDECTOMY      ARTHROPLASTY KNEE Right 7/25/2022    Procedure: RIGHT TOTAL KNEE ARTHROPLASTY;  Surgeon: Giuliano Deshpande MD;  Location:  OR    AS TOTAL KNEE ARTHROPLASTY      BACK SURGERY      ESOPHAGOSCOPY, GASTROSCOPY, DUODENOSCOPY (EGD), COMBINED N/A 11/28/2015    Procedure: COMBINED ESOPHAGOSCOPY, GASTROSCOPY, DUODENOSCOPY (EGD);  Surgeon: Danis Castillo MD;  Location:  GI    ESOPHAGOSCOPY, GASTROSCOPY, DUODENOSCOPY (EGD), COMBINED N/A 7/26/2018    Procedure: COMBINED ESOPHAGOSCOPY, GASTROSCOPY, DUODENOSCOPY (EGD);;  Surgeon: Toby Dong DO;  Location:  GI    ESOPHAGOSCOPY, GASTROSCOPY, DUODENOSCOPY (EGD), COMBINED N/A 8/17/2020    Procedure: ESOPHAGOGASTRODUODENOSCOPY (EGD);  Surgeon: Herrera Henry MD;  Location:  GI    ESOPHAGOSCOPY, GASTROSCOPY, DUODENOSCOPY (EGD), COMBINED N/A 10/24/2020    Procedure: ESOPHAGOGASTRODUODENOSCOPY (EGD);  Surgeon: Vick Florentino MD;  Location:  GI    ESOPHAGOSCOPY, GASTROSCOPY, DUODENOSCOPY (EGD), COMBINED N/A 4/11/2022    Procedure: ESOPHAGOGASTRODUODENOSCOPY (EGD);  Surgeon: Vick Florentino MD;  Location:  GI    ESOPHAGOSCOPY, GASTROSCOPY, DUODENOSCOPY (EGD), COMBINED N/A 8/26/2022    Procedure: ESOPHAGOGASTRODUODENOSCOPY (EGD);  Surgeon: El Mcgovern MD;  Location:  GI    HERNIA REPAIR  2006    HERNIORRHAPHY VENTRAL  4/17/2013    Procedure: HERNIORRHAPHY VENTRAL;  VENTRAL HERNIA REPAIR WITH MESH;   Surgeon: Patel Guzman MD;  Location:  OR    KNEE SURGERY      arthroscopic right knee surgery     LOBECTOMY LUNG Right 3/26/2019    POSSIBLE LOBECTOMY LUNG per Dr. Vasques at [facility]    REPAIR LIGAMENT ANKLE  2/23/2012    Procedure:REPAIR LIGAMENT ANKLE; LEFT TARSAL TUNNEL RELEASE OF KNOT OF ARIEL RELEASE; Surgeon:SAUL PUENTE; Location: OR    REPLACE VALVE AORTIC N/A 9/3/2015    Procedure: REPLACE VALVE AORTIC;  Surgeon: Antonino Mitchell MD;  Location:  OR    ROTATOR CUFF REPAIR RT/LT      bilateral    SPINE SURGERY      3 spine surgeries    THORACOTOMY, WEDGE RESECTION LUNG, COMBINED Right 3/26/2019    RIGHT THORACOTOMY, WEDGE RESECTION, RIGHT LOWER LOBE LUNG NODULE,;  Surgeon: Jose A Vasques MD;  Location:  OR    TONSILLECTOMY      TURP          Physical Exam   Patient Vitals for the past 24 hrs:   BP Temp Temp src Pulse Resp SpO2   08/16/23 2329 -- -- -- 112 17 100 %   08/16/23 2327 -- -- -- 106 22 100 %   08/16/23 2326 -- 98.7  F (37.1  C) Oral -- 16 --   08/16/23 2319 -- -- -- -- (P) 23 --   08/16/23 2312 134/69 -- -- 105 24 100 %   08/16/23 2302 135/71 -- -- 103 -- 100 %        Physical Exam  General: Sitting up in bed  Eyes:  The pupils are equal and round    Conjunctivae and sclerae are normal  ENT:    Atraumatic face  Neck:  Normal range of motion  CV:  Irregular rate and rhythm    Skin warm and well perfused   Resp:  Non labored breathing on room air    No tachypnea    No cough heard, diminished breath sounds in bases  GI:  Abdomen is soft, there is no rigidity    No distension    No rebound tenderness     Mild diffuse abdominal tenderness  MS:  Normal muscular tone  Skin:  No rash or acute skin lesions noted  Neuro:   Awake, alert.      Speech is normal and fluent.    Face is symmetric.     Moves all extremities equally  Psych: Normal affect.  Appropriate interactions.    Emergency Department Course   ECG    ECG results from 08/16/23   EKG 12-lead, tracing  only     Value    Systolic Blood Pressure     Diastolic Blood Pressure     Ventricular Rate 107    Atrial Rate 278    NM Interval     QRS Duration 134        QTc 448    P Axis -87    R AXIS 14    T Axis 79    Interpretation ECG      Atrial flutter with variable A-V block  Right bundle branch block  T wave abnormality, consider lateral ischemia  Abnormal ECG  When compared with ECG of 08-NOV-2022 21:23,  Atrial flutter has replaced Sinus rhythm  Vent. rate has increased BY  39 BPM  Confirmed by GENERATED REPORT, COMPUTER ([id number]),  Irineo Lagunas ([id number]) on 8/16/2023 11:14:53 PM     EKG 12-lead, tracing only     Value    Systolic Blood Pressure     Diastolic Blood Pressure     Ventricular Rate 109    Atrial Rate 278    NM Interval     QRS Duration 134        QTc 430    P Axis     R AXIS -26    T Axis 79    Interpretation ECG      Atrial flutter with variable A-V block  Right bundle branch block  Abnormal ECG  When compared with ECG of 16-AUG-2023 23:05,  QRS axis Shifted left  Confirmed by GENERATED REPORT, COMPUTER ([id number]),  Irineo Lagunas ([id number]) on 8/17/2023 12:37:14 AM       *Note: Due to a large number of results and/or encounters for the requested time period, some results have not been displayed. A complete set of results can be found in Results Review.       Imaging:  XR Chest 2 Views   Preliminary Result   IMPRESSION: Sternotomy and aortic valve replacement. Normal cardiac size. Slight venous congestion. A few strands of linear atelectasis in the lower lungs. Postoperative changes right lower lobe. Tiny right pleural effusion. No effusion on the left.    Atherosclerotic thoracic aorta. Degenerative changes both shoulders and the thoracic spine.         Report per radiology    Laboratory:  Labs Ordered and Resulted from Time of ED Arrival to Time of ED Departure   COMPREHENSIVE METABOLIC PANEL - Abnormal       Result Value    Sodium 135 (*)     Potassium 4.0      Chloride 100      Carbon  Dioxide (CO2) 25      Anion Gap 10      Urea Nitrogen 21.6      Creatinine 1.44 (*)     Calcium 8.7      Glucose 133 (*)     Alkaline Phosphatase 103      AST 11      ALT 7      Protein Total 5.8 (*)     Albumin 3.0 (*)     Bilirubin Total 0.4      GFR Estimate 46 (*)    CBC WITH PLATELETS AND DIFFERENTIAL - Abnormal    WBC Count 11.8 (*)     RBC Count 4.12 (*)     Hemoglobin 11.1 (*)     Hematocrit 35.0 (*)     MCV 85      MCH 26.9      MCHC 31.7      RDW 13.7      Platelet Count 150      % Neutrophils 83      % Lymphocytes 5      % Monocytes 8      % Eosinophils 4      % Basophils 0      % Immature Granulocytes 0      NRBCs per 100 WBC 0      Absolute Neutrophils 9.7 (*)     Absolute Lymphocytes 0.6 (*)     Absolute Monocytes 0.9      Absolute Eosinophils 0.5      Absolute Basophils 0.0      Absolute Immature Granulocytes 0.0      Absolute NRBCs 0.0     ISTAT GASES LACTATE VENOUS POCT - Abnormal    Lactic Acid POCT 0.6      Bicarbonate Venous POCT 27      O2 Sat, Venous POCT 79 (*)     pCO2 Venous POCT 54 (*)     pH Venous POCT 7.31 (*)     pO2 Venous POCT 49 (*)      Emergency Department Course & Assessments:    Interventions:  Medications   lactated ringers BOLUS 1,000 mL (has no administration in time range)        Assessments:  Patient evaluated by myself    Independent Interpretation (X-rays, CTs, rhythm strip):  I independently reviewed chest xray -venous congestion seen, no pneumonia    Consultations/Discussion of Management or Tests:  Spoke to hospitalist for admission Dr. Muñiz    Social Determinants of Health affecting care:   None    Disposition:  The patient was admitted to the hospital under the care of Dr. Muñiz.     Impression & Plan      Medical Decision Making:  Hermilo Velasquez is a 89-year-old male who presented to the emergency department with shortness of breath.  Per his wife, her main concern was his generalized weakness.  She is also concerned about the diarrhea.  Has mild diffuse  abdominal tenderness.  CT shows findings of colitis which I suspect is C. difficile colitis given his history.  She has given him 2 doses of oral vancomycin and will continue this.  Stool sample was ordered but not obtained yet.  Chest x-ray shows findings of venous congestion.  His BNP is elevated.  While in the emergency department he was primarily on his baseline oxygen of 2 to 3 L and maintaining oxygenation.  His EKG shows atrial fibrillation/flutter which he has a history of.  Suspect that he has generalized weakness from colitis.  Not safe to be discharged at this time.  Required assist of 2 in the emergency department.  Discussed patient with hospitalist for admission.    Diagnosis:    ICD-10-CM    1. Pulmonary nodules  R91.8       2. Colitis  K52.9       3. Chronic respiratory failure with hypoxia (H)  J96.11       4. Generalized weakness  R53.1       5. Atrial fibrillation with rapid ventricular response (H)  I48.91            8/16/2023   Mei Krishnamurthy MD Goertz, Maria Kristine, MD  08/17/23 0658       Mei Krishnamurthy MD  08/17/23 0701

## 2023-08-18 NOTE — PLAN OF CARE
[id number]: No acute events overnight. Pt appeared to sleep well. Weaned from 3LPM O2 NC back down to baseline 2LPM O2 NC. No BM overnight. Pt may need PT consult pending clinical progress. CC/SW to see. ID following; prolonged vanco taper.        Orientation/Cognitive: A&O X4. [facility]  Observation Goals (Met/ Not Met): Inpatient  Mobility Level/Assist Equipment: Ax1-2 w/ GB and walker. Baseline uses walker ind.  Fall Risk (Y/N): Y  Behavior Concerns: None  Pain Management: denies pain  Tele/VS/O2: VSS on 2 LPM O2 NC (baseline). Tele a-fib CVR.     ABNL Lab/BG: creatinine 1.32, GFR 51, WBC 12.8, C-Diff positive  Diet: Reg  Bowel/Bladder: Incontinent of bowel at times (none overnight), voiding adequately w/o difficulty into urinal.   Skin Concerns: blanchable redness on sacrum/coccyx. Pt turns self in bed.   Drains/Devices: PIV infusing

## 2023-08-18 NOTE — PROGRESS NOTES
"[facility]    Medicine Progress Note - Hospitalist Service    Date of Admission:  8/16/2023    Assessment & Plan   Hermilo Velasquez is a 89 year old male with a history of O2 dependent COPD, AORTIC STENOSIS, GERD, peripheral neuopathy who is admitted on 8/16/2023 with recurrent diarrhea after recent 10 day course of vanco for c.diff.      Recurrent C. Diff infection with colitis  Positive test in July and apparent improvement after initial 10 day course of oral vanco, now with likely recurrence. CT shows evidence of colitis, unclear if present prior. Also with mild leukocytosis.   -was planning fidaxomin instead of vanco given recurrence after treatment with vanco  -- Appreciate ID consult.  See note for details. \"vancomycin prolonged taper in hospital to 250 4 times daily as this is relatively severe and his age,risk   2 at discharge 125 4 times daily to complete 2 weeks then 125 twice daily for 2 weeks then 125 daily for 4 weeks then 125 every other day for 4 weeks then stop and watch. Be  ready to restart immediately if diarrhea relapses.  At that point if relapses consideration will be a stool transplant as the next alternative\"    COPD - oxygen dependent on 2L chronically  Small right sided pleural effusion  Mildly hypoxic at home per EMS and had to turn up O2 to 3L. Does not seem to be an active exacerbation and does not seem volume overloaded.  -continue chronic O2 at 2L NC  -continue PTA nebs/inhalers once confirmed     Atrial fibrillation with RVR  Has hx of paroxysmal afib. Denies current symptoms.   -tele  -prn metoprolol  -treat C.diff     Renal insufficiency  Unclear recently baseline as creatine has been around 1.4 on past two checks but was <1 in November 2022.   -monitor     GERD  -Pepcid     Dyslipidemia  -resume statin on discharge     BPH  -flomax     Left lung base nodules - 2 noted incidentally on abd CT  -follow up with PCP to consider 3-6 month repeat CT      " "  Diet: Combination Diet Regular Diet Adult    DVT Prophylaxis: Pneumatic Compression Devices  [place]azo Catheter: Not present  Lines: None     Cardiac Monitoring: ACTIVE order. Indication: Tachyarrhythmias, acute (48 hours)  Code Status: Full Code      Clinically Significant Risk Factors              # Hypoalbuminemia: Lowest albumin = 3 g/dL at 8/16/2023 11:13 PM, will monitor as appropriate     # Hypertension: Noted on problem list  # Chronic heart failure with preserved ejection fraction: heart failure noted on problem list and last echo with EF >50%           # COPD: noted on problem list        Disposition Plan      Expected Discharge Date: 08/20/2023        Discharge Comments: santana Newton MD  Hospitalist Service  [facility]  Securely message with [employer] (more info)  Text page via [employer] Paging/Directory   ______________________________________________________________________    Interval History   Feels much better compared to yesterday.  Bowels are more formed.  Denies any nausea/vomiting/fever    Physical Exam   /60 (BP Location: Right arm)   Pulse 97   Temp 97.6  F (36.4  C) (Oral)   Resp 16   Ht 1.778 m (5' 10\")   Wt 67.4 kg (148 lb 9.4 oz)   SpO2 94%   BMI 21.32 kg/m    Gen- pleasant lying in bed  CVS- I+II+ no m/r/g  RS- CTAB  Abdo- soft, no tenderness . No g/r/r  Ext- no edema     Medical Decision Making       51 MINUTES SPENT BY ME on the date of service doing chart review, history, exam, documentation & further activities per the note.      Data   ------------------------- PAST 24 HR DATA REVIEWED -----------------------------------------------    I have personally reviewed the following data over the past 24 hrs:    8.8  \   10.8 (L)   / 137 (L)     136 103 18.4 /  83   4.2 20 (L) 1.16 \     Procal: N/A CRP: N/A Lactic Acid: 1.0         Imaging results reviewed over the past 24 hrs:   No results found for this or any previous visit (from the " past 24 hour(s)).

## 2023-08-18 NOTE — PROGRESS NOTES
Orientation/Cognitive: A&O X4  Observation Goals (Met/ Not Met): Inpatient  Mobility Level/Assist Equipment: A2 GB/W  Fall Risk (Y/N): Y  Behavior Concerns: green  Pain Management: denies pain  Tele/VS/O2: Tele atrial flutter, VSS, O2 3 LPM nasal cannula  ABNL Lab/BG: creatinine 1.32, GFR 51, WBC 12.8, C-Diff positive  Diet: Reg  Bowel/Bladder: Incontinent of BM, uses urinal for bladder  Skin Concerns: blanchable redness on bottom  Drains/Devices: PIV infusing NS 50 mL/hr  Tests/Procedures for next shift: SW consult  Anticipated DC date & active delays: TBD

## 2023-08-18 NOTE — PLAN OF CARE
Shift:[id number] 8/18/23  Summary:recurrent diarrhea  Orientation/Cognitive:A/Ox4, [place]   Observation Goals (Met/ Not Met):inpt   Mobility Level/Assist Equipment:A1-2 GB/W   Fall Risk (Y/N):Yes   Behavior Concerns:none   Pain Management: denies pain  Tele/VS/O2:VSS on chronic 2L O2, Tele afib CVR  ABNL Lab/BG:Hgb 10.8, Lactic 1.0, Cr 1.32, WBC 12.8  Diet:regular  Bowel/Bladder: up to BSC, Loose stool x1 this shift   Skin Concerns: blanchable redness on sacrum/coccyx   Drains/Devices:IV infusing NS 50ml/hr   Tests/Procedures for next shift:PT consult pending   Anticipated DC date & active delays: pending, ID following   Patient Stated Goal for Today:  Other important info:

## 2023-08-18 NOTE — PROGRESS NOTES
08/18/23 1800   Appointment Info   Signing Clinician's Name / Credentials (PT) Cayla Pineda, PT, DPT   Living Environment   People in Home spouse   Current Living Arrangements house   Home Accessibility stairs to enter home   Number of Stairs, Main Entrance 2   Stair Railings, Main Entrance railing on left side (ascending)  (and bannister on the R)   Transportation Anticipated family or friend will provide;car, drives self   Self-Care   Usual Activity Tolerance good   Current Activity Tolerance moderate   Equipment Currently Used at Home cane, straight;cane, quad   Fall history within last six months yes   Number of times patient has fallen within last six months 1  (at least one)   Activity/Exercise/Self-Care Comment Pt reports independent w/ functional mobility at home, with cane, inconsistent answer in terms of type of cane and how often he uses one. Might also own a FWW. Reports he's on 2L O2 at night, 'sometimes during the day when he's feeling hazy'.   General Information   Onset of Illness/Injury or Date of Surgery 08/16/23   Referring Physician Evan Newton MD   Patient/Family Therapy Goals Statement (PT) to go home   Pertinent History of Current Problem (include personal factors and/or comorbidities that impact the POC) Per chart: Hermilo Velasquez is a 89 year old male with a history of O2 dependent COPD, AORTIC STENOSIS, GERD, peripheral neuopathy who is admitted on 8/16/2023 with recurrent diarrhea after recent 10 day course of vanco for c.diff.   Existing Precautions/Restrictions fall   Weight-Bearing Status - LLE full weight-bearing   Weight-Bearing Status - RLE full weight-bearing   General Observations [place]   Cognition   Affect/Mental Status (Cognition) WFL   Cognitive Status Comments possible memory deficits, not formally assessed   Pain Assessment   Patient Currently in Pain   (neuropathy in B feet, possibly worse today compared to baseline per pt)   Integumentary/Edema   Integumentary/Edema  Comments age related changes   Posture    Posture Forward head position   Range of Motion (ROM)   Range of Motion ROM is WFL   Strength (Manual Muscle Testing)   Strength Comments generally against gravity and functional   Bed Mobility   Comment, (Bed Mobility) SBA supine <>seated, moves well   Transfers   Comment, (Transfers) SBA sit<>stand with or without FWW, relatively steady   Gait/Stairs (Locomotion)   [place] Level (Gait) contact guard   Assistive Device (Gait) walker, front-wheeled   Distance in Feet (Gait) 100ft w/ FWW, x 90ft with quad cane, x 10ft SEC   Comment, (Gait/Stairs) steady with FWW; tolerated well   Balance   Balance Comments improved steadiness with FWW compared to quad or SEC   Sensory Examination   Sensory Perception Comments baseline B feet neuropathy w/ tingling and numbness   Clinical Impression   Criteria for Skilled Therapeutic Intervention Yes, treatment indicated   PT Diagnosis (PT) impaired functional mobility   Influenced by the following impairments decreased activity tolerance, balance, safety awareness   Functional limitations due to impairments bed mob, transfers, ambulation, stairs   Clinical Presentation (PT Evaluation Complexity) Evolving/Changing   Clinical Presentation Rationale clinical judgement   Clinical Decision Making (Complexity) moderate complexity   Planned Therapy Interventions (PT) balance training;bed mobility training;gait training;home exercise program;patient/family education;neuromuscular re-education;stair training;strengthening;transfer training   Anticipated Equipment Needs at Discharge (PT) walker, rolling   Risk & Benefits of therapy have been explained evaluation/treatment results reviewed;care plan/treatment goals reviewed;risks/benefits reviewed;current/potential barriers reviewed;participants voiced agreement with care plan;participants included;patient   PT Total Evaluation Time   PT Eval, Moderate Complexity Minutes (13204) 15   Physical  Therapy Goals   PT Frequency Daily   PT Predicted Duration/Target Date for Goal Attainment 08/25/23   PT Goals Bed Mobility;Transfers;Gait;Stairs   PT: Bed Mobility Independent   PT: Transfers Modified independent;Sit to/from stand;Bed to/from chair;Assistive device   PT: Gait Modified independent;Rolling walker;150 feet   PT: Stairs Modified independent;2 stairs;Rail on left   Interventions   Interventions Quick Adds Gait Training;Therapeutic Activity   Therapeutic Activity   Therapeutic Activities: dynamic activities to improve functional performance Minutes (29380) 10   Treatment Detail/Skilled Intervention Supine to seated EOB w/ SBA, impulsive but follows cuing well. Tolerated upright well. On 2L O2, unclear if he uses O2 during the day or with activity, pt unable to state clearly. Transfers w/ FWW or cane with SBA, no overt LOB. Up to recliner chair at end of session.   Gait Training   Gait Training Minutes (37942) 17   Symptoms Noted During/After Treatment (Gait Training) fatigue   Treatment Detail/Skilled Intervention Ambulation w/ FWW where pt demonstrates steady, slow pace, tolerated well on 2L O2 at 95% +. Stairs x 4 up and down w/ L railing, light use of R railing, steady. Trialed quad cane back to room where pt w/ slower pace, less steady compared to FWW. SEC x 10ft in room, pt reports he likes the SEC at the time, but inconsisent overall response. Regardless, FWW recommended for safety.   [place] Level (Gait Training) contact guard   Physical Assistance Level (Gait Training) verbal cues   Weight Bearing (Gait Training) full weight-bearing   PT Discharge Planning   PT Plan progress ambulation FWW vs cane   PT Discharge Recommendation (DC Rec) home with assist;home with home care physical therapy   PT Rationale for DC Rec Patient appears to be close to his baseline for functional mobility, would benefit from use of FWW instead of cane for safety and home PT to address fall risk and safety awareness.    PT Brief overview of current status SBA-CGA x 200ft   Total Session Time   Timed Code Treatment Minutes 27   Total Session Time (sum of timed and untimed services) 42

## 2023-08-18 NOTE — PROGRESS NOTES
Enteric precaution. AOX4. [place]. VSS on 2L NC at baseline. AX1 gb/walker. Up in chair. Denies pain. Tele- Afib CVR. ID following. On po antibiotics. NS infusing 50 mL/hr. PT consult pending. SW consulted. Report given to Nurse frausto. Pt transferred around 1645.

## 2023-08-18 NOTE — CONSULTS
"CLINICAL NUTRITION SERVICES  -  ASSESSMENT NOTE    Recommendations Ordered by Registered Dietitian (RD):   - Magic Cup w/ meals BID (vanilla)    Malnutrition:   % Weight Loss:  None noted  % Intake:  Decreased intake does not meet criteria for malnutrition - pt denies   Subcutaneous Fat Loss:  Orbital region severe depletion, Upper arm region severe depletion, and Thoracic region severe depletion  Muscle Loss:  Temporal region severe depletion, Clavicle bone region moderate-severe depletion, Acromion bone region moderate-severe depletion, Dorsal hand region moderate depletion, and Anterior thigh region moderate depletion  Fluid Retention:  None noted    Malnutrition Diagnosis: Severe malnutrition  In Context of:  Chronic illness or disease     REASON FOR ASSESSMENT  Hermilo Velasquez is a 89 year old male seen by Registered Dietitian for Nutrition Admission Risk Screen Received -   Have you recently lost weight without trying ? - \"yes, unsure\"  Have you been eating poorly due to a decreased appetite ?- \"yes\"    NUTRITION HISTORY  - Information obtained from chart and patient report.   - Came into ED for increased work of breathing, shortness of breath. Takes 4L NC at baseline. Has been having diarrhea the past week, had CDiff in July. Hx CDiff.     - Pt seen in room. He reports he has lost quite a bit of weight - used to weigh 175#, now closer to 160# (though measured wt is actually lower than that).   - He reports a good appetite at home. When asked, he indicated that his diarrhea has actually given him MORE appetite.   - He has seen ensure in the past. Does not take it himself at home.     CURRENT NUTRITION ORDERS  Diet Order:     Regular     Current Intake/Tolerance:  Good appetite. He had lunch delivered just before visit -I helped him set it up (tator tots, hamburger w/ gonzalez, soda). Had an adequate breakfast this morning as well.     NUTRITION FOCUSED PHYSICAL ASSESSMENT FOR DIAGNOSING " "MALNUTRITION)  Yes    Observed:    Muscle wasting (refer to documentation in Malnutrition section) and Subcutaneous fat loss (refer to documentation in Malnutrition section)    ANTHROPOMETRICS  Height: 5' 10\"  Weight: 148 lbs 9.44 oz (67.4 kg)   Body mass index is 21.32 kg/m .  Weight Status:  Normal BMI  IBW: 75.5 kg   % IBW: 89%  Weight History: Current wt appears consistent with wt measured in Dec 2022, alos consistent with wt of 2 months ago. Do not expect pt has had significant wt changes the past 6+ months.   Wt Readings from Last 10 Encounters:   08/17/23 67.4 kg (148 lb 9.4 oz)   07/17/23 68.9 kg (152 lb)   06/20/23 67.4 kg (148 lb 8 oz)   05/17/23 70 kg (154 lb 4.8 oz)   03/16/23 70.8 kg (156 lb)   12/28/22 67.7 kg (149 lb 3.2 oz)   12/02/22 72.1 kg (158 lb 14.4 oz)   11/23/22 71.2 kg (156 lb 14.4 oz)   11/11/22 81.6 kg (180 lb)   10/16/22 69.1 kg (152 lb 5.4 oz)       LABS  Labs reviewed    MEDICATIONS  Medications reviewed  Iron 65 mg elemental  NaCl IVF @ 50 mL/hr.     ASSESSED NUTRITION NEEDS PER APPROVED PRACTICE GUIDELINES:  Dosing Weight 67 kg   Estimated Energy Needs: 7350-8625 kcals (25- Kcal/Kg)  Justification: maintenance  Estimated Protein Needs:  grams protein (1.2-1.5 g pro/Kg)  Justification: preservation of lean body mass  Estimated Fluid Needs: 1 mL/kcal  Justification: maintenance    MALNUTRITION:  % Weight Loss:  None noted  % Intake:  Decreased intake does not meet criteria for malnutrition - pt denies   Subcutaneous Fat Loss:  Orbital region severe depletion, Upper arm region severe depletion, and Thoracic region severe depletion  Muscle Loss:  Temporal region severe depletion, Clavicle bone region moderate-severe depletion, Acromion bone region moderate-severe depletion, Dorsal hand region moderate depletion, and Anterior thigh region moderate depletion  Fluid Retention:  None noted    Malnutrition Diagnosis: Severe malnutrition  In Context of:  Chronic illness or " disease    NUTRITION DIAGNOSIS:  Malnutrition related to inconsistent PO intakes & increased needs w/ diarrhea as evidenced by moderate-severe fat and muscle wasting.    NUTRITION INTERVENTIONS  Recommendations / Nutrition Prescription  Regular diet  Magic cup BID w/ meals - vanilla     Implementation  Nutrition education: Per Provider order if indicated   Medical Food Supplement: as ordered     Nutrition Goals  Intake of at least 75% adequate trays TID.     MONITORING AND EVALUATION:  Progress towards goals will be monitored and evaluated per protocol and Practice Guidelines    Nessa Sutton RD, LD  Pager: [phone]  Weekend Pager: [phone]

## 2023-08-18 NOTE — PROGRESS NOTES
[facility]  Infectious Disease Progress Note          Assessment and Plan:   Date of Admission:  8/16/2023  Date of Consult (When I saw the patient): 08/17/23        Assessment & Plan  Hermilo Velasquez is a 89 year old who was admitted on 8/16/2023.      Impression: 1 9-year-old male with acute relapsed C. difficile colitis, rapid relapse after recent 10-day Vanco course, positive stool, leukocytosis and fever imaging without toxic megacolon.  2 COPD  3 atrial fibrillation  4 chronic kidney disease     REC 1 2 valid choice of treatment here, would have no objection to fidaxomicin but unfortunately its  preoperative cost will be a major issue here, patient has Medicare so support programs not available and does not have very good prescription coverage . Likely the better choice here is vancomycin prolonged taper in hospital to 250 4 times daily as this is relatively severe and his age,risk   2 at discharge 125 4 times daily to complete 2 weeks then 125 twice daily for 2 weeks then 125 daily for 4 weeks then 125 every other day for 4 weeks then stop and watch. Be  ready to restart immediately if diarrhea relapses.  At that point if relapses consideration will be a stool transplant as the next alternative  2 avoid antibiotics is much as possible going forward for the next several months   okay disposition when other issues allow, call if issues this weekend              Interval History:     no new complaints and doing well; no cp, sob, n/v/d, or abd pain.  temp down white count still 12,000, diarrhea significantly better already              Medications:      atorvastatin  10 mg Oral Daily    DULoxetine  60 mg Oral Daily    famotidine  20 mg Oral Daily    ferrous sulfate  325 mg Oral BID    gabapentin  600 mg Oral At Bedtime    sodium chloride (PF)  3 mL Intracatheter Q8H    tamsulosin  0.4 mg Oral Daily    umeclidinium  1 puff Inhalation Daily    vancomycin  250 mg Oral 4x Daily               "    Physical Exam:   Blood pressure 101/64, pulse 106, temperature 97.3  F (36.3  C), temperature source Oral, resp. rate 16, height 1.778 m (5' 10\"), weight 67.4 kg (148 lb 9.4 oz), SpO2 94 %.  Wt Readings from Last 2 Encounters:   08/17/23 67.4 kg (148 lb 9.4 oz)   07/17/23 68.9 kg (152 lb)     Vital Signs with Ranges  Temp:  [97.3  F (36.3  C)-98.5  F (36.9  C)] 97.3  F (36.3  C)  Pulse:  [] 106  Resp:  [16-22] 16  BP: (101-122)/(60-66) 101/64  SpO2:  [94 %-99 %] 94 %    Constitutional: Awake, alert, cooperative, no apparent distress     Lungs: Clear to auscultation bilaterally, no crackles or wheezing   Cardiovascular: Regular rate and rhythm, normal S1 and S2, and no murmur noted   Abdomen: Normal bowel sounds, soft, non-distended, non-tender   Skin: No rashes, no cyanosis, no edema   Other:           Data:   All microbiology laboratory data reviewed.  Recent Labs   Lab Test 08/17/23  0702 08/16/23  2313 07/17/23  1201   WBC 12.8* 11.8* 7.3   HGB 11.1* 11.1* 11.9*   HCT 34.7* 35.0* 38.6*   MCV 85 85 88    150 177     Recent Labs   Lab Test 08/17/23  0702 08/16/23  2313 07/17/23  1201   CR 1.32* 1.44* 1.30*     Recent Labs   Lab Test 10/06/22  1526   SED 43*     Recent Labs   Lab Test 04/14/21  0815 04/13/21  2054 04/13/21 2038 09/13/20  1329 09/13/20  1256 08/15/20  1451 03/22/20  1950 03/22/20  1941 03/22/20  1935   CULT Canceled, Test credited  >10 Squamous epithelial cells/low power field indicates oral contamination. Please   recollect.  *  Notification of test cancellation was given to  Elva Ospina RN [id number] 4/14/20 by AM   No growth No growth No growth No growth No growth  No growth No growth Cultured on the 4th day of incubation:  Staphylococcus capitis  Identification obtained by MALDI-TOF mass spectrometry research use only database. Test   characteristics determined and verified by the [facility]   ([facility]) [place], [place].  *  Critical Value/Significant " Value, preliminary result only, called to and read back by  ANNABELLE CABRERA RN  03.26.20 CF    (Note)  POSITIVE for Staphylococci other than S.aureus, S.epidermidis and  S.lugdunensis, by [employer]ig[employer] multiplex nucleic acid test.  Coagulase-negative staphylococci are the most common venipuncture or  collection associated skin CONTAMINANTS grown in blood cultures.  Final identification and antimicrobial susceptibility testing will be  verified by standard methods.    Specimen tested with [employer]igene multiplex, gram-positive blood culture  nucleic acid test for the following targets: Staph aureus, Staph  epidermidis, Staph lugdunensis, other Staph species, Enterococcus  faecalis, Enterococcus faecium, Streptococcus species, S. agalactiae,  S. anginosus grp., S. pneumoniae, S. pyogenes, Listeria sp., mecA  (methicillin resistance) and Samanta/B (vancomycin resistance).    Critical Value/Significant Value called to and read back by Doreen Silverman RN on 3.26.20 at 0727. bw   No growth

## 2023-08-18 NOTE — CONSULTS
Care Management Initial Consult    General Information  Assessment completed with: Patient, Patient  Type of CM/SW Visit: Initial Assessment    Primary Care Provider verified and updated as needed: Yes   Readmission within the last 30 days:        Reason for Consult: discharge planning  Advance Care Planning: Advance Care Planning Reviewed: present on chart          Communication Assessment  Patient's communication style: spoken language (English or Bilingual)    Hearing Difficulty or Deaf: yes   Wear Glasses or Blind: yes    Cognitive  Cognitive/Neuro/Behavioral: WDL  Level of Consciousness: alert  Arousal Level: opens eyes spontaneously  Orientation: oriented x 4     Best Language: 0 - No aphasia  Speech: clear, spontaneous, logical    Living Environment:   People in home: spouse  Roberta  Current living Arrangements: house      Able to return to prior arrangements: yes       Family/Social Support:  Care provided by: self, spouse/significant other  Provides care for: no one  Marital Status:   Wife, Children  Roberta       Description of Support System: Supportive, Involved    Support Assessment: Adequate family and caregiver support, Adequate social supports    Current Resources:   Patient receiving home care services: No     Community Resources: None  Equipment currently used at home: cane, straight  Supplies currently used at home: None    Employment/Financial:  Employment Status: retired        Financial Concerns: No concerns identified   Referral to Financial Worker: No       Does the patient's insurance plan have a 3 day qualifying hospital stay waiver?  Yes   Will the waiver be used for post-acute placement? TBD    Lifestyle & Psychosocial Needs:  Social Determinants of Health     Tobacco Use: Medium Risk (7/17/2023)    Patient History     Smoking Tobacco Use: Former     Smokeless Tobacco Use: Never     Passive Exposure: Not on file   Alcohol Use: Not on file   Financial Resource Strain: Low Risk   (5/12/2020)    Overall Financial Resource Strain (CARDIA)     Difficulty of Paying Living Expenses: Not hard at all   Food Insecurity: No Food Insecurity (5/12/2020)    Hunger Vital Sign     Worried About Running Out of Food in the Last Year: Never true     Ran Out of Food in the Last Year: Never true   Transportation Needs: No Transportation Needs (5/12/2020)    PRAPARE - Transportation     Lack of Transportation (Medical): No     Lack of Transportation (Non-Medical): No   Physical Activity: Inactive (5/12/2020)    Exercise Vital Sign     Days of Exercise per Week: 0 days     Minutes of Exercise per Session: 0 min   Stress: No Stress Concern Present (5/12/2020)    [place] [employer] - Occupational Stress Questionnaire     Feeling of Stress : Not at all   Social Connections: Moderately Isolated (5/12/2020)    Social Connection and Isolation Panel [NHANES]     Frequency of Communication with Friends and Family: Twice a week     Frequency of Social Gatherings with Friends and Family: Twice a week     Attends [employer] Services: Never     Active Member of Clubs or Organizations: No     Attends Club or Organization Meetings: Never     Marital Status:    Intimate Partner Violence: Not At Risk (5/12/2020)    Humiliation, Afraid, Rape, and Kick questionnaire     Fear of Current or Ex-Partner: No     Emotionally Abused: No     Physically Abused: No     Sexually Abused: No   Depression: Not at risk (5/17/2023)    PHQ-2     PHQ-2 Score: 1   Housing Stability: Not on file       Functional Status:  Prior to admission patient needed assistance:   Dependent ADLs:: Independent, Ambulation-cane  Dependent IADLs:: Cleaning, Cooking, Laundry, Shopping, Medication Management, Meal Preparation, Money Management       Mental Health Status:  Mental Health Status: No Current Concerns       Chemical Dependency Status:  Chemical Dependency Status: No Current Concerns             Values/Beliefs:  Spiritual,  Cultural Beliefs, Religion Practices, Values that affect care: no               Additional Information:  Per care management/social work consult for discharge planing. Patient admitted on 0816/ due to atrial fibrillation. Sw reviewed chart and spoke with patient. Per patient report he resides in a house with his spouse with a few PATI and one flight of stairs within the home that he does not have to navigate. Patient indicated he is independent with mobility at baseline with the use of a cane.  Sw informed he receives assistance form his spouse for household tasks and his children who live near by. Patients goal is to return home and he indicated he previously used [facility] home [facility]. SW informed patient that Sw will stop by after therapy recs are in to discuss next steps. Sw will continue to follow.    SY Gutierrez    [facility]

## 2023-08-19 NOTE — PROGRESS NOTES
Care Management Follow Up    Length of Stay (days): 2    Expected Discharge Date: 08/20/2023     Concerns to be Addressed:       Patient plan of care discussed at interdisciplinary rounds: Yes    Anticipated Discharge Disposition: Other (Comments) (TBD)     Anticipated Discharge Services: None  Anticipated Discharge DME: None    Patient/family educated on Medicare website which has current facility and service quality ratings:    Education Provided on the Discharge Plan:    Patient/Family in Agreement with the Plan: yes    Referrals Placed by CM/SW:    Private pay costs discussed: Not applicable    Additional Information:  Updated from RN that MD said patient can discharge if everything is in order. Writer sent a referral via [facility] for homecare for Rn/OT/PT.      PIPO Manriquez, LGSW   Social Work   [facility]

## 2023-08-19 NOTE — PLAN OF CARE
Goal Outcome Evaluation:  Alert and orinted x 4.Up with 1/Walker/belt. Denies pain. Tele NSR O2 on 2l baseline.  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 No stools this shift.  [place]. Awaiting possible discharge to home.

## 2023-08-19 NOTE — DISCHARGE SUMMARY
"[facility]  Hospitalist Discharge Summary      Date of Admission:  8/16/2023  Date of Discharge:  8/19/2023  Discharging Provider: Evan Newton MD  Discharge Service: Hospitalist Service    Discharge Diagnoses   Recurrent C. 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  The following labs/tests are recommended: CBC, BMP.          {Additional follow-up instructions/to-do's for PCP    :*Left lung base nodules - 2 noted incidentally on abd CT  -follow up with PCP to consider 3-6 month repeat CT    Unresulted Labs Ordered in the Past 30 Days of this Admission       No orders found from 7/17/2023 to 8/17/2023.            Discharge Disposition   Discharged to home  Condition at discharge: Stable    Hospital Course   Hermilo Velasquez is a 89 year old male with a history of O2 dependent COPD, AORTIC STENOSIS, GERD, peripheral neuopathy who is admitted on 8/16/2023 with recurrent diarrhea after recent 10 day course of vanco for c.diff.      Recurrent C. Diff infection with colitis  Positive test in July and apparent improvement after initial 10 day course of oral vanco, now with likely recurrence. CT shows evidence of colitis, unclear if present prior. Also with mild leukocytosis.   -was planning fidaxomin instead of vanco given recurrence after treatment with vanco  -- Appreciate ID consult.  See note for details. \"vancomycin prolonged taper in hospital to 250 4 times daily as this is relatively severe and his age,risk   2 at " "discharge 125 4 times daily to complete 2 weeks then 125 twice daily for 2 weeks then 125 daily for 4 weeks then 125 every other day for 4 weeks then stop and watch. Be  ready to restart immediately if diarrhea relapses.  At that point if relapses consideration will be a stool transplant as the next alternative\"  He clinically improved with higher dose of vancomycin and was discharged home in stable condition.    COPD - oxygen dependent on 2L chronically  Small right sided pleural effusion  Mildly hypoxic at home per EMS and had to turn up O2 to 3L. Does not seem to be an active exacerbation and does not seem volume overloaded.  -continued chronic O2 at 2L NC  -continued PTA nebs/inhalers once confirmed     Atrial fibrillation with RVR  Has hx of paroxysmal afib. Denies current symptoms.   -tele  -prn metoprolol  -treat C.diff     Renal insufficiency  Unclear recently baseline as creatine has been around 1.4 on past two checks but was <1 in November 2022.   -monitor     GERD  -Pepcid     Dyslipidemia  -resumed statin on discharge     BPH  -flomax     Left lung base nodules - 2 noted incidentally on abd CT  -follow up with PCP to consider 3-6 month repeat CT     Consultations This Hospital Stay   CARE MANAGEMENT / SOCIAL WORK IP CONSULT  INFECTIOUS DISEASES IP CONSULT  PHYSICAL THERAPY ADULT IP CONSULT    Code Status   Full Code    Time Spent on this Encounter   I, Evan Newton MD, personally saw the patient today and spent greater than 30 minutes discharging this patient.       Evan Newton MD  [facility]  [address] [place] [id number]  Phone: [phone]  Fax: [phone]  ______________________________________________________________________    Physical Exam   Vital Signs: Temp: 97.9  F (36.6  C) Temp src: Oral BP: 132/66 Pulse: 65   Resp: 18 SpO2: 95 % O2 Device: Nasal cannula Oxygen Delivery: 2 LPM  Weight: 148 lbs 9.44 oz  Gen- pleasant lying in bed  CVS- I+II+ no " m/r/g  RS- CTAB  Abdo- soft, no tenderness . No g/r/r  Ext- no edema   Primary Care Physician   Karson Bishop    Discharge Orders      Home Care Referral      Reason for your hospital stay    Hermilo Velasquez is a 89 year old male with a history of O2 dependent COPD, AORTIC STENOSIS, GERD, peripheral neuopathy who is admitted on 8/16/2023 with recurrent diarrhea after recent 10 day course of vanco for c.diff.      Recurrent C. Diff infection with colitis     Follow-up and recommended labs and tests     Follow up with primary care provider, Karson Bishop, within 7 days for hospital follow- up.  The following labs/tests are recommended: CBC, BMP.     Activity    Your activity upon discharge: activity as tolerated     Diet    Follow this diet upon discharge: Orders Placed This Encounter      Snacks/Supplements Adult: Magic Cup; With Meals      Combination Diet Regular Diet Adult       Significant Results and Procedures   Results for orders placed or performed during the hospital encounter of 08/16/23   XR Chest 2 Views    Narrative    EXAM: XR CHEST 2 VIEWS  LOCATION: [facility]  DATE: 8/16/2023    INDICATION: SOB, fever.  COMPARISON: CT chest, abdomen and pelvis without IV contrast 03/13/2023.      Impression    IMPRESSION: Sternotomy and aortic valve replacement. Normal cardiac size. Slight venous congestion. A few strands of linear atelectasis in the lower lungs. Postoperative changes right lower lobe. Tiny right pleural effusion. No effusion on the left.   Atherosclerotic thoracic aorta. Degenerative changes both shoulders and the thoracic spine.   CT Abdomen Pelvis w/o Contrast    Narrative    EXAM: CT ABDOMEN AND PELVIS WITHOUT CONTRAST  LOCATION: [facility]  DATE/TIME: 8/17/2023 1:58 AM CDT    INDICATION: Recent treatment for C. difficile colitis. Abdominal pain and diarrhea.  COMPARISON: 03/13/2023 - CT chest, abdomen and pelvis.    TECHNIQUE: CT scan of  the abdomen and pelvis was performed without IV contrast. Multiplanar reformats were obtained. Dose reduction techniques were used.  CONTRAST: None.    FINDINGS:    LOWER CHEST: Mild emphysematous changes in the lung bases. 0.7 x 0.5 cm nodule in the posterior aspect of the left lung base (series 3 image 9) and 0.4 cm nodule in the posterolateral aspect of the left lung base (series 3 image 6), not visualized on   03/13/2023. Tiny loculated-appearing right pleural effusion again noted. Prior median sternotomy.    HEPATOBILIARY: A few subcentimeter low-attenuation foci scattered within the liver, too small to characterize.    SPLEEN: Unremarkable.    PANCREAS: Mild diffuse atrophy.    ADRENAL GLANDS: Unremarkable.    KIDNEYS/BLADDER: 0.3 cm vascular calcification versus nonobstructing calculus in the upper pole of the left kidney.    BOWEL: No dilatation of the small or large bowel. Apparent mild wall thickening of the descending colon through the rectum. Additionally, there is slight haziness within the fat about the thick-walled colon. The appendix is not visualized. No pneumatosis   or free intraperitoneal gas.    LYMPH NODES: Unremarkable.    PELVIC ORGANS: Mild to moderate enlargement of the prostate gland.    MUSCULOSKELETAL: No acute findings.    OTHER: Atherosclerotic calcification in the abdominal aorta. 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      Impression    IMPRESSION:   1. Apparent mild wall thickening of the descending colon through the rectum in addition to slight haziness in the fat about the thick-walled colon. This is suggestive of infectious or inflammatory colitis.  2. No other acute abnormality identified in the abdomen or pelvis.  3. Two small nodules in the left lung base, not visualized on 03/13/2023. A follow-up CT scan of the chest would be useful in 3-6 months for reevaluation.      *Note: Due to a large number of  results and/or encounters for the requested time period, some results have not been displayed. A complete set of results can be found in Results Review.       Discharge Medications   Current Discharge Medication List        CONTINUE these medications which have CHANGED    Details   vancomycin (VANCOCIN) 125 MG capsule vancomycin prolonged taper:  250 4 times daily for 7 days then 125 mg 4 times daily for 2 weeks then 125 twice daily for 2 weeks then 125 daily for 4 weeks then 125 every other day for 4 weeks then stop and watch. Be  ready to restart immediately if diarrhea relapses.  Qty: 220 capsule, Refills: 0    Associated Diagnoses: C. difficile colitis           CONTINUE these medications which have NOT CHANGED    Details   acetaminophen (TYLENOL) 500 MG tablet Take 1,000 mg by mouth 3 times daily as needed      albuterol (PROAIR HFA/PROVENTIL HFA/VENTOLIN HFA) 108 (90 Base) MCG/ACT inhaler Inhale 2 puffs into the lungs every 6 hours as needed      albuterol (PROVENTIL) (2.5 MG/3ML) 0.083% neb solution Take 1 vial (2.5 mg) by nebulization every 6 hours as needed for shortness of breath / dyspnea or wheezing  Qty: 180 mL, Refills: 11      atorvastatin (LIPITOR) 10 MG tablet Take 1 tablet (10 mg) by mouth daily  Qty: 90 tablet, Refills: 0    Associated Diagnoses: Hyperlipidemia LDL goal <100      DULoxetine (CYMBALTA) 60 MG capsule Take 60 mg by mouth daily      famotidine (PEPCID) 40 MG tablet Take 1 tablet (40 mg) by mouth 2 times daily  Qty: 60 tablet, Refills: 3    Associated Diagnoses: Gastroesophageal reflux disease without esophagitis      ferrous sulfate (FE TABS) 325 (65 Fe) MG EC tablet Take 1 tablet (325 mg) by mouth 2 times daily      gabapentin (NEURONTIN) 300 MG capsule Take 3 capsules (900 mg) by mouth At Bedtime For neuropathy pain  Qty: 270 capsule, Refills: 3    Associated Diagnoses: Peripheral polyneuropathy      hydrOXYzine (ATARAX) 50 MG tablet Take 50 mg by mouth 2 times daily      tamsulosin  "(FLOMAX) 0.4 MG capsule Take 0.4 mg by mouth daily      tiotropium (SPIRIVA) 18 MCG inhaled capsule Inhale 18 mcg into the lungs daily      fluticasone-vilanterol (BREO ELLIPTA) 200-25 MCG/ACT inhaler Inhale 1 puff into the lungs daily           Allergies   Allergies   Allergen Reactions    Omeprazole Itching    Pantoprazole Itching    Prevacid [Lansoprazole] Itching    Lasix [Furosemide] Rash    Lidocaine Blisters and Rash     Allergy to lidocaine ointment  Allergy to lidocaine ointment      Penicillin G Rash    Penicillins Rash     \"broke out from injection\" 60 yrs ago  Tolerates cephalosporins     "

## 2023-08-19 NOTE — PLAN OF CARE
Up with 1/Walker/belt. Po ok. Denies pain. Tele NSR with 1 degree block. O2 on 2l as at home. Neuropathy to bilateral LE. No stools this evening. Awaiting possible discharge to home.

## 2023-08-19 NOTE — PROGRESS NOTES
Care Management Discharge Note    Discharge Date: 08/19/2023       Discharge Disposition: [facility]     Discharge Services: None    Discharge DME: None    Discharge Transportation: family or friend will provide, car, drives self    Private pay costs discussed: Not applicable    Does the patient's insurance plan have a 3 day qualifying hospital stay waiver?  No    PAS Confirmation Code:    Patient/family educated on Medicare website which has current facility and service quality ratings:      Education Provided on the Discharge Plan:    Persons Notified of Discharge Plans: patient, nurse, MD   Patient/Family in Agreement with the Plan: yes    Handoff Referral Completed: No    Additional Information:  [facility] Discharge orders received and faxed to [facility] via [employer]. No delay in start of care. Writer called and updated charge RN. Writer updated bedside RNChinmay that homecare has been secured.     PIPO Manriquez, [facility]   Social Work   [facility]

## 2023-08-19 NOTE — DISCHARGE INSTRUCTIONS
PT Discharge Recommendation (DC Rec)    08/18 1800  home with assist;home with home care physical therapy   PT Rationale for DC Rec    08/18 1800  Patient appears to be close to his baseline for functional mobility, would benefit from use of FWW instead of cane for safety and home PT to address fall risk and safety awareness.

## 2023-08-19 NOTE — PLAN OF CARE
Physical Therapy Discharge Summary    Reason for therapy discharge:    Discharged to home.    Progress towards therapy goal(s). See goals on Care Plan in [facility] electronic health record for goal details.  Goals partially met.  Barriers to achieving goals:   discharge from facility.    Therapy recommendation(s):    No further therapy is recommended.

## 2023-08-19 NOTE — PLAN OF CARE
Mental Status: A&O x4.  Activity/dangle: Ast of 1 GB/W  Diet: Tolerating regular diet  Pain: Denies pain  Suazo/Voiding: Voiding adequately in the urinal and bathroom  Tele/Restraints/Iso: NSR. Enteric isolation maintained  02/LDA: 2L O2 NC baseline. Removed PIV.  Other Info: Baseline BLE numbness and tingling. Went over AVS. Pharmacy unable to fill Vancomycin as pt has some left at home, educated pt on calling [facility] when he needs prescription to be filled. Called spouse Roberta to update on Vancomycin, spouse aware to call [facility]. Discharge home with spouse.

## 2023-08-21 NOTE — PATIENT INSTRUCTIONS
"Recommendations from today's MTM visit:                                                    MTM (medication therapy management) is a service provided by a clinical pharmacist designed to help you get the most of out of your medicines.   Today we reviewed what your medicines are for, how to know if they are working, that your medicines are safe and how to make your medicine regimen as easy as possible.      Continue medications as prescribed    Follow-up: Return in about 1 year (around 8/21/2024).    It was great speaking with you today.  I value your experience and would be very thankful for your time in providing feedback in our clinic survey. In the next few days, you may receive an email or text message from [employer] with a link to a survey related to your  clinical pharmacist.\"     To schedule another MTM appointment, please call the clinic directly or you may call the MTM scheduling line at [phone] or toll-free at [phone].     My Clinical Pharmacist's contact information:                                                      Please feel free to contact me with any questions or concerns you have.      Valentin Cat, Pharm. D., [facility]  Medication Therapy Management Pharmacist     "

## 2023-08-21 NOTE — PROGRESS NOTES
Medication Therapy Management (MTM) Encounter    ASSESSMENT:                            Medication Adherence/Access: No issues identified    Cdiff:   Improving    LUTS:   Stable.      Pain:   Stable.      COPD: Stable.      CAD:   Stable.     Heartburn: Stable.    Supplements: Stable.     PLAN:                            Continue medications as prescribed    Follow-up: Return in about 1 year (around 8/21/2024).    SUBJECTIVE/OBJECTIVE:                          Hermilo Velasquez is a 89 year old male called for a transitions of care visit. He was discharged from [facility] on 8/19 for CDiff.      Reason for visit: LEAH and 6 month follow-up.    Allergies/ADRs: Reviewed in chart  Past Medical History: Reviewed in chart  Tobacco: He reports that he quit smoking about 25 years ago. His smoking use included cigarettes. He has a 110.00 pack-year smoking history. He has never used smokeless tobacco.  Alcohol: none    Medication Adherence/Access: no issues reported    Cdiff:   Vancomycin 250 mg 4 times daily - reports diarrhea is a little bit better.     LUTS:   Tamsulosin 0.4 mg daily and reports this is helpful. No concerns or questions at this time.      Pain:   acetaminophen 1000 mg three times a day as needed - uses about every other day  gabapentin 600 mg at night - reports that this makes him sleepy if he takes 3 or during the day.   duloxetine 60 mg once daily.      Reports no issues and that this helps.      COPD:   LAMA- Spiriva Handihaler 1 puff(s) once daily  Short-Acting Bronchodilator: Albuterol MDI about every couple days  Nebs and is unclear how often he uses this. Also uses Oxygen at night.     Has Breo still on list but does not seem to be taking.  Patient is not experiencing side effects.   Patient reports the following symptoms: none.     CAD:   atorvastatin 10 mg daily (reports he has a couple different manufacturers and they have different numbers on them).      Not on aspirin due to history of GI bleed.       BP Readings from Last 3 Encounters:   08/19/23 132/66   07/17/23 126/63   06/20/23 100/57       Pulse Readings from Last 3 Encounters:   08/19/23 65   07/17/23 78   06/20/23 85       Recent Labs   Lab Test 11/30/21  1123 06/18/20  1144   CHOL 138 107   HDL 55 32*   LDL 71 53   TRIG 58 109        Heartburn: Currently taking famotidine 40 mg once daily Reports that this helps.         Supplements:   ferrous sulfate 325 mg twice a day. No concerns or questions at this time.      Hemoglobin   Date Value Ref Range Status   08/19/2023 10.6 (L) 13.3 - 17.7 g/dL Final   05/27/2021 11.6 (L) 13.3 - 17.7 g/dL Final   ]         Today's Vitals: There were no vitals taken for this visit.  ----------------  Post Discharge Medication Reconciliation Status: discharge medications reconciled, continue medications without change.    I spent 17 minutes with this patient today. All changes were made via collaborative practice agreement with Karson Bishop MD. A copy of the visit note was provided to the patient's provider(s).    A summary of these recommendations was sent via [employer].    Valentin Cat, Pharm. D., [facility]  Medication Therapy Management Pharmacist      Telemedicine Visit Details  Type of service:  Telephone visit  Start Time:  201 pm  End Time:  218 pm     Medication Therapy Recommendations  No medication therapy recommendations to display

## 2023-08-21 NOTE — PROGRESS NOTES
"Clinic Care Coordination Contact  [facility]: Post-Discharge Note  SITUATION                                                      Admission:    Admission Date: 08/16/23   Reason for Admission: SOB  Discharge:   Discharge Date: 08/19/23  Discharge Diagnosis: Recurrent C. Diff infection with colitis    BACKGROUND                                                      Per hospital discharge summary and inpatient provider notes:Hermilo Velasquez is a 89 year old male with a history of O2 dependent COPD, AORTIC STENOSIS, GERD, peripheral neuopathy who is admitted on 8/16/2023 with recurrent diarrhea after recent 10 day course of vanco for c.diff.      Recurrent C. Diff infection with colitis  Positive test in July and apparent improvement after initial 10 day course of oral vanco, now with likely recurrence. CT shows evidence of colitis, unclear if present prior. Also with mild leukocytosis.   -was planning fidaxomin instead of vanco given recurrence after treatment with vanco      ASSESSMENT           Discharge Assessment  How are you doing now that you are home?: \" I am doing real good \"  How are your symptoms? (Red Flag symptoms escalate to triage hotline per guidelines): Improved  Do you feel your condition is stable enough to be safe at home until your provider visit?: Yes  Does the patient have their discharge instructions? : Yes  Does the patient have questions regarding their discharge instructions? : No  Were you started on any new medications or were there changes to any of your previous medications? : Yes  Does the patient have all of their medications?: Yes  Do you have questions regarding any of your medications? : No  Do you have all of your needed medical supplies or equipment (DME)?  (i.e. oxygen tank, CPAP, cane, etc.): Yes  Discharge follow-up appointment scheduled within 14 calendar days? : No    Post-op ([facility] CTA Only)  If the patient had a surgery or procedure, do they have any questions for a " nurse?: No           PLAN                                                      Outpatient Plan:  Follow-up Appointments     Follow-up and recommended labs and tests       Follow up with primary care provider, Karson Bishop, within 7 days for   hospital follow- up.  The following labs/tests are recommended: CBC, BMP.           {Additional follow-up instructions/to-do's for PCP    :*Left lung base nodules - 2 noted incidentally on abd CT  -follow up with PCP to consider 3-6 month repeat CT    Future Appointments   Date Time Provider Department Center   8/21/2023  2:00 PM Valentin Cat [facility]   9/11/2023  3:00 PM [id number] KRISTYN ROMERO         For any urgent concerns, please contact our 24 hour nurse triage line: [phone] ([phone])         Ciara Huynh

## 2023-08-21 NOTE — TELEPHONE ENCOUNTER
Home Care is calling regarding an established patient with [facility].       Requesting orders from: Karson Bishop  Provider is following patient: Yes  Is this a 60-day recertification request?  No    Orders Requested    Delay of care for Nursing, PHYSICAL THERAPY and Occupational Therapy until 8/24/23.       Confirmed ok to leave a detailed message with call back.  Contact information confirmed and updated as needed.    Evie Mckeon RN

## 2023-08-23 NOTE — TELEPHONE ENCOUNTER
Home Care is calling regarding an established patient with [facility].       Requesting orders from: Karson Bishop  Provider is following patient: Yes  Is this a 60-day recertification request?  No    Orders Requested    Skilled Nursing  Request for delay in care, service is not able to be provided within same scheduled day.   Delay start of care from 8/24 to 8/28 per pt request     Physical Therapy  Request for delay in care, service is not able to be provided within same scheduled day.   Delay start of care from 8/24 to 8/28 per pt request     Occupational Therapy  Request for delay in care, service is not able to be provided within same scheduled day.   Delay start of care from 8/24 to 8/28 per pt request     Confirmed ok to leave a detailed message with call back.  Contact information confirmed and updated as needed.    Loan Valdez RN

## 2023-08-29 NOTE — PROGRESS NOTES
Assessment & Plan     Hospital discharge follow-up  C. difficile colitis  Renal insufficiency    Reiterated importance of diligence with vancomycin taper.  Push fluids.  Unspecified fatigue, will ensure CBC and BMP have returned to normal.  Continue follow-up with PCP as well as MTM.    - CBC with platelets and differential  - Basic metabolic panel  (Ca, Cl, CO2, Creat, Gluc, K, Na, BUN)    Chronic obstructive pulmonary disease, unspecified COPD type (H)  Continue inhalers.    Pulmonary nodules  Upcoming repeat lung CT.    Acute pain of left shoulder  Conservative measures given length of time.  Heat.  Gentle stretching.  Tylenol + diclofenac cream.  Follow-up with any new or worsening symptoms.      30 minutes spent by me on the date of the encounter doing chart review, review of test results, interpretation of tests, patient visit, and documentation      MED REC REQUIRED  Post Medication Reconciliation Status: discharge medications reconciled and changed, per note/orders    The likelihood of other entities in the differential is insufficient to justify any further testing for them at this time. This was explained to the patient. The patient was advised that persistent or worsening symptoms would require further evaluation. Patient advised to call the office and if unable to reach to go to the emergency room if they develop any new or worsening symptoms. Expressed understanding and agreement with above stated plan.     JESÚS Barahona [facility] RENETTA Akhtar is a 89 year old, presenting for the following health issues:  Hospital F/U    Here for hospital discharge. Doing much better. Notes some mild fatigue. Appetite stable. Bowel movements have returned to normal. Discussed needed diligence with vanco taper to resolve this and avoid relapses. Breathing is fine per patient. Notes left shoulder pain x 1 day. No recent falls. Pain with over the head movements. Lateral  "shoulder, no radiation.         8/21/2023    11:07 AM   Post Discharge Outreach   Admission Date 8/16/2023   Reason for Admission SOB   Discharge Date 8/19/2023   Discharge Diagnosis Recurrent C. Diff infection with colitis   How are you doing now that you are home? \" I am doing real good \"   How are your symptoms? (Red Flag symptoms escalate to triage hotline per guidelines) Improved   Do you feel your condition is stable enough to be safe at home until your provider visit? Yes   Does the patient have their discharge instructions?  Yes   Does the patient have questions regarding their discharge instructions?  No   Were you started on any new medications or were there changes to any of your previous medications?  Yes   Does the patient have all of their medications? Yes   Do you have questions regarding any of your medications?  No   Do you have all of your needed medical supplies or equipment (DME)?  (i.e. oxygen tank, CPAP, cane, etc.) Yes   Discharge follow-up appointment scheduled within 14 calendar days?  No     Hospital Follow-up Visit:    Hospital/Nursing Home/IP Rehab Facility: [facility]  Date of Admission: 8-  Date of Discharge: 8-  Reason(s) for Admission: Recurrent C. Diff infection with colitis     COPD - oxygen dependent on 2L chronically  Small right sided pleural effusion     Atrial fibrillation with RVR  Renal insufficiency  GERD  Dyslipidemia  BPH  Left lung base nodules     Was your hospitalization related to COVID-19? No   Problems taking medications regularly:  Yes, Vancomycin  Medication changes since discharge: None  Problems adhering to non-medication therapy:  None    Summary of hospitalization:  [facility] discharge summary reviewed  Diagnostic Tests/Treatments reviewed.  Follow up needed: none  Other Healthcare Providers Involved in Patient s Care:          PCP  Update since discharge: improved.         Plan of care communicated with " "patient             ospital Course  Hermilo Velasquez is a 89 year old male with a history of O2 dependent COPD, AORTIC STENOSIS, GERD, peripheral neuopathy who is admitted on 8/16/2023 with recurrent diarrhea after recent 10 day course of vanco for c.diff.      Recurrent C. Diff infection with colitis  Positive test in July and apparent improvement after initial 10 day course of oral vanco, now with likely recurrence. CT shows evidence of colitis, unclear if present prior. Also with mild leukocytosis.   -was planning fidaxomin instead of vanco given recurrence after treatment with vanco  -- Appreciate ID consult.  See note for details. \"vancomycin prolonged taper in hospital to 250 4 times daily as this is relatively severe and his age,risk   2 at discharge 125 4 times daily to complete 2 weeks then 125 twice daily for 2 weeks then 125 daily for 4 weeks then 125 every other day for 4 weeks then stop and watch. Be  ready to restart immediately if diarrhea relapses.  At that point if relapses consideration will be a stool transplant as the next alternative\"  He clinically improved with higher dose of vancomycin and was discharged home in stable condition.     COPD - oxygen dependent on 2L chronically  Small right sided pleural effusion  Mildly hypoxic at home per EMS and had to turn up O2 to 3L. Does not seem to be an active exacerbation and does not seem volume overloaded.  -continued chronic O2 at 2L NC  -continued PTA nebs/inhalers once confirmed     Atrial fibrillation with RVR  Has hx of paroxysmal afib. Denies current symptoms.   -tele  -prn metoprolol  -treat C.diff     Renal insufficiency  Unclear recently baseline as creatine has been around 1.4 on past two checks but was <1 in November 2022.   -monitor     GERD  -Pepcid     Dyslipidemia  -resumed statin on discharge     BPH  -flomax     Left lung base nodules - 2 noted incidentally on abd CT  -follow up with PCP to consider 3-6 month repeat CT    Review of " "Systems   Constitutional, HEENT, cardiovascular, pulmonary, GI, , musculoskeletal, neuro, skin, endocrine and psych systems are negative, except as otherwise noted.      Objective    BP 92/46 (BP Location: Right arm, Patient Position: Sitting, Cuff Size: Adult Regular)   Pulse 73   Temp 97.9  F (36.6  C) (Oral)   Resp 16   Ht 1.778 m (5' 10\")   Wt 68.9 kg (151 lb 14.4 oz)   SpO2 92%   BMI 21.80 kg/m    Body mass index is 21.8 kg/m .  Physical Exam   GENERAL: healthy, alert and no distress  EYES: Eyes grossly normal to inspection, PERRL and conjunctivae and sclerae normal  NECK: no adenopathy, no asymmetry, masses, or scars and thyroid normal to palpation  RESP: lungs clear to auscultation - no rales, rhonchi or wheezes  CV: regular rate and rhythm, normal S1 S2, no S3 or S4, no murmur, click or rub, no peripheral edema and peripheral pulses strong  ABDOMEN: soft, nontender, no hepatosplenomegaly, no masses  MS: no gross musculoskeletal defects noted, no edema.  No pain with palpation over left shoulder/clavicle area.  Decreased range of motion and pain with overhead movements.  Neck with full range of motion.  SKIN: no suspicious lesions or rashes  NEURO: Normal strength and tone, mentation intact and speech normal  PSYCH: mentation appears normal, affect normal/bright                  "

## 2023-08-30 NOTE — TELEPHONE ENCOUNTER
Called and spoke with pt. Pt gave verbal consent to speak to his wife (Roberta) about his health information.     Relayed Alexander Wladen's message from below.     Pts wife if wondering if he can still take tylenol?     Also, approval to use a same day slot next week for a follow up?    Routing to provider to review and advise.     Please call pt back with PCPs recommendations.

## 2023-08-30 NOTE — TELEPHONE ENCOUNTER
Writer called and spoke with patient's wife Roberta and reviewed provider's recommendations. Roberta expressed verbal understanding and is agreeable. Patient prefers afternoon appointments only, no afternoon availability with PCP, scheduled for follow up with Alexander as advised:    9/7/2023 3:30 PM (Arrive by 3:10 PM) Alexander Walden PA-C [facility]     No further questions or concerns at this time.    Signing encounter.    Luis Mccabe RN  [facility]

## 2023-08-30 NOTE — RESULT ENCOUNTER NOTE
Routine labs after hospital stay reveal worsening kidney function in addition to increased white count.  Needs to push fluids!  Avoid NSAIDs.  Unspecified FRED.  Potentially Vanco related? UTI? I would hold Vanco for now and recommend a follow-up in person next week for re-evaluation and repeating of labs + urine.

## 2023-08-30 NOTE — TELEPHONE ENCOUNTER
----- Message from Alexander Walden PA-C sent at 8/30/2023  1:25 PM CDT -----  Routine labs after hospital stay reveal worsening kidney function in addition to increased white count.  Needs to push fluids!  Avoid NSAIDs.  Unspecified FRED.  Potentially Vanco related? UTI? I would hold Vanco for now and recommend a follow-up in person next week for re-evaluation and repeating of labs + urine.

## 2023-09-07 NOTE — PROGRESS NOTES
Assessment & Plan     FRED (acute kidney injury) (H)  Repeat labs including CBC, BMP, and urinalysis.  Continue to push fluids.  Avoidance of NSAIDs.  I will contact him with results of the blood work and the plan going forward.    - CBC with platelets and differential  - Basic metabolic panel  (Ca, Cl, CO2, Creat, Gluc, K, Na, BUN)  - UA Macroscopic with reflex to Microscopic and Culture - Lab Collect    Need for prophylactic vaccination and inoculation against influenza  Flu shot today.      20 minutes spent by me on the date of the encounter doing chart review, review of test results, interpretation of tests, patient visit, and documentation      MED REC REQUIRED  Post Medication Reconciliation Status: discharge medications reconciled and changed, per note/orders    No follow-ups on file.    The likelihood of other entities in the differential is insufficient to justify any further testing for them at this time. This was explained to the patient. The patient was advised that persistent or worsening symptoms would require further evaluation. Patient advised to call the office and if unable to reach to go to the emergency room if they develop any new or worsening symptoms. Expressed understanding and agreement with above stated plan.     Alexander Walden PA-C  [facility]   Hermilomike Velasquez is a 89 year old male presenting for the following health issues:  Patient presents with:  Follow Up  Imm/Inj: Flu Shot    Here today with his wife for follow-up.  Continues to do well following hospital discharge follow-up.  Feeling better each day.  Diarrhea has resolved.  Reviewed blood work from recent hospital discharge follow-up.  This shows increased white count 11.7  In addition to an increased creatinine from 1.10 to 1.66.  History of CKD stage III as well as history of FRED's.    Denies fever, chills, night sweats.  No nausea, vomiting, or abdominal pain.  Notes no hematuria  "increased frequency/urgency.  Appetite stable.      Review of Systems   Constitutional, HEENT, cardiovascular, pulmonary, GI, , musculoskeletal, neuro, skin, endocrine and psych systems are negative, except as otherwise noted.      Objective    /59 (BP Location: Right arm, Patient Position: Sitting, Cuff Size: Adult Regular)   Pulse 64   Temp 97  F (36.1  C) (Tympanic)   Ht 1.778 m (5' 10\")   Wt 68.1 kg (150 lb 3.2 oz)   SpO2 (!) 81%   BMI 21.55 kg/m    5' 10\"  150 lbs 3.2 oz    Physical Exam   GENERAL: healthy, alert and no distress  EYES: Eyes grossly normal to inspection, PERRL and conjunctivae and sclerae normal  NECK: no adenopathy, no asymmetry, masses, or scars and thyroid normal to palpation  RESP: lungs clear to auscultation - no rales, rhonchi or wheezes  CV: regular rate and rhythm, normal S1 S2, no S3 or S4, no murmur, click or rub, no peripheral edema  ABDOMEN: soft, nontender, no hepatosplenomegaly, no masses  MS: no gross musculoskeletal defects noted, no edema  SKIN: no suspicious lesions or rashes  NEURO: Normal strength and tone, mentation intact and speech normal  PSYCH: mentation appears normal, affect normal/bright      "

## 2023-09-09 NOTE — RESULT ENCOUNTER NOTE
Ron Akhtar,     Happy to report your kidney function is improving. Continue to stay well hydrated.     Alexander Walden PA-C  [facility]

## 2023-09-12 PROBLEM — E86.0 DEHYDRATION: Status: ACTIVE | Noted: 2023-01-01

## 2023-09-12 PROBLEM — A04.72 C. DIFFICILE COLITIS: Status: ACTIVE | Noted: 2023-01-01

## 2023-09-12 NOTE — ED PROVIDER NOTES
"  History     Chief Complaint:  Hypotension    The history is provided by the patient and the EMS personnel.      Hermilo Velasquez is a 89 year old male with C. difficile infection c/b recurrent colitis despite outpatient abx treatment, with an extensive medical history including adenocarcinoma of the right lung, COPD, PE, centrilobular emphysema, PAF, hypertension, CHF, stage III CKD, DVT, GERD, Non Hodgkin's lymphoma, severe sepsis, colitis, and AAA who presents to the ED via EMS from home after an unwitnessed fall. Hermilo's wife found him face-down at the bottom of their stairwell. He endorses feeling lightheaded prior to the fall. Unclear whether the patient lost consciousness or endured a head injury. EMS found the patient hypotensive at 59/29, tachycardic between 130-160 bpm, and covered in stool, as well was his bed. He was diagnosed with C. Diff in July which is complicated by disease recurrence, for which he has been taking Vancomycin, but he stopped taking it in the last week because he was feeling improved. EMS administered 500 ml saline, increasing his blood pressure. . EMS notes a skin tear on the patient's right arm.     Independent Historian:   EMS - They report as above.    Review of External Notes:   Office visit 9/7/2023: \"FRED (acute kidney injury) (H)  Repeat labs including CBC, BMP, and urinalysis.  Continue to push fluids.  Avoidance of NSAIDs.  I will contact him with results of the blood work and the plan going forward.     - CBC with platelets and differential  - Basic metabolic panel  (Ca, Cl, CO2, Creat, Gluc, K, Na, BUN)  - UA Macroscopic with reflex to Microscopic and Culture - Lab Collect    Here today with his wife for follow-up.  Continues to do well following hospital discharge follow-up.  Feeling better each day.  Diarrhea has resolved.  Reviewed blood work from recent hospital discharge follow-up.  This shows increased white count 11.7  In addition to an increased creatinine " "from 1.10 to 1.66.  History of CKD stage III as well as history of FRED's. \"    Medications:    Lipitor  Pepcid  Neurontin  Vancocin  Cymbalta  Flomax  Spiriva  Breo ellipta  Albuterol    Past Medical History:    Adenocarcinoma, right lung  COPD  Pulmonary nodules  PE  Centrilobular emphysema  Aortic stenosis  PAF  DVT  GERD  Heart murmur  Right bundle branch block  Monoclonal gammopathy  Neuropathy  Hyperlipidemia  Non Hodgkin's lymphoma  Severe sepsis with acute organ dysfunction  Anemia  Hypertension  Spongiotic dermatitis  Bullous pemphigoid  FRED  CKD, stage III  GI bleed  Pneumonia left lower lobe  Acute on chronic CHF  Hemarthrosis  Encephalopathy  Chronic respiratory failure with hypoxia  Altered mental status  AAA without rupture  Colitis  Generalized weakness    Past Surgical History:    Appendectomy  Tonsillectomy  Hernia repair  Herniorrhaphy  Right TKA  Spine surgery x3  Right thoracotomy wedge resection  TURP  Right lung lobectomy  Replace aortic valve  Repair ligament ankle, left tarsal tunnel release  Rotator cuff repair, bilateral    Physical Exam   Patient Vitals for the past 24 hrs:   BP Temp Temp src Pulse Resp SpO2   09/12/23 2015 103/69 -- -- 108 12 --   09/12/23 1830 112/63 -- -- (!) 121 25 92 %   09/12/23 1824 106/59 -- -- 117 22 94 %   09/12/23 1800 91/57 -- -- (!) 126 18 97 %   09/12/23 1740 100/55 -- -- (!) 128 22 99 %   09/12/23 1720 102/58 -- -- (!) 130 23 93 %   09/12/23 1710 94/51 -- -- (!) 131 24 94 %   09/12/23 1700 107/62 -- -- (!) 137 20 97 %   09/12/23 1650 107/62 -- -- (!) 141 22 95 %   09/12/23 1641 (!) 86/42 98.1  F (36.7  C) Temporal (!) 134 18 93 %   09/12/23 1640 -- -- -- (!) 134 23 94 %   09/12/23 1630 -- -- -- (!) 135 20 90 %   09/12/23 1622 (!) 86/54 -- -- (!) 137 23 --     Physical Exam  General: Alert, oriented to person and place  HEENT: Conjunctivae clear, no scleral icterus  Neuro: No gross deficit, moving all extremities equally, no facial droop, no slurred speech "   CV: Regular rate and rhythm, no murmurs, pulses equal   Respiratory: No signs of respiratory distress, lungs clear to auscultation bilaterally   Abdomen: Voluntary tensing with palpation although denies pain and not grimacing   MSK: Skin tear of arm, stool covering legs     Emergency Department Course   ECG  ECG taken at 1736, ECG read at 1737  Wide QRS tachycardia  Right bundle branch block  Abnormal ECG   Wide QRS tachycardia has replaced atrial flutter as compared to prior, dated 08/17/2023.  Rate 128 bpm. OR interval * ms. QRS duration 134 ms. QT/QTc 350/511 ms. P-R-T axes * -25 63.      Imaging:  Abd/pelvis CT,  IV  contrast only TRAUMA / AAA   Final Result   IMPRESSION:    1.  Proctitis and pancolitis which may reflect an infectious or inflammatory process. No abscess or free air.   2.  Trace bilateral pleural effusions.      Head CT w/o contrast   Final Result   IMPRESSION:   1.  No CT evidence for acute intracranial process.   2.  Brain atrophy and presumed chronic microvascular ischemic changes as above.      XR Chest Port 1 View   Final Result   IMPRESSION: Pulmonary emphysema and chronic interstitial changes, with reticulonodular appearance in the right lung base. Superimposed mild interstitial edema is difficult to exclude.       Suture material also overlies the right lung base. Small right pleural effusion. No pneumothorax.      Mild cardiomegaly. Atherosclerosis of the thoracic aorta. Postsurgical changes of median sternotomy. Aortic valve prosthesis.      Suture anchor at the left proximal humerus.         Report per radiology    Laboratory:  Labs Ordered and Resulted from Time of ED Arrival to Time of ED Departure   COMPREHENSIVE METABOLIC PANEL - Abnormal       Result Value    Sodium 138      Potassium 4.8      Chloride 105      Carbon Dioxide (CO2) 21 (*)     Anion Gap 12      Urea Nitrogen 23.1 (*)     Creatinine 1.59 (*)     Calcium 8.8      Glucose 169 (*)     Alkaline Phosphatase 122       AST 13      ALT 16      Protein Total 6.4      Albumin 3.3 (*)     Bilirubin Total 0.6      GFR Estimate 41 (*)    CBC WITH PLATELETS AND DIFFERENTIAL - Abnormal    WBC Count 25.5 (*)     RBC Count 4.37 (*)     Hemoglobin 11.5 (*)     Hematocrit 38.1 (*)     MCV 87      MCH 26.3 (*)     MCHC 30.2 (*)     RDW 14.1      Platelet Count 188      % Neutrophils 91      % Lymphocytes 2      % Monocytes 7      % Eosinophils 0      % Basophils 0      % Immature Granulocytes 0      NRBCs per 100 WBC 0      Absolute Neutrophils 23.0 (*)     Absolute Lymphocytes 0.6 (*)     Absolute Monocytes 1.7 (*)     Absolute Eosinophils 0.0      Absolute Basophils 0.0      Absolute Immature Granulocytes 0.1      Absolute NRBCs 0.0     LIPASE - Abnormal    Lipase 12 (*)    TROPONIN T, HIGH SENSITIVITY - Abnormal    Troponin T, High Sensitivity 97 (*)    ISTAT GASES LACTATE VENOUS POCT - Abnormal    Lactic Acid POCT 3.5 (*)     Bicarbonate Venous POCT 22      O2 Sat, Venous POCT 20 (*)     pCO2 Venous POCT 48      pH Venous POCT 7.27 (*)     pO2 Venous POCT 18 (*)    ISTAT GASES LACTATE VENOUS POCT - Abnormal    Lactic Acid POCT 1.9      Bicarbonate Venous POCT 21      O2 Sat, Venous POCT 51 (*)     pCO2 Venous POCT 50      pH Venous POCT 7.23 (*)     pO2 Venous POCT 32     TROPONIN T, HIGH SENSITIVITY - Abnormal    Troponin T, High Sensitivity 83 (*)    INR - Normal    INR 1.15     CK TOTAL - Normal    CK 51     TYPE AND SCREEN, ADULT    ABO/RH(D) A POS      Antibody Screen Negative      SPECIMEN EXPIRATION DATE [id number]     BLOOD CULTURE   BLOOD CULTURE   ABO/RH TYPE AND SCREEN     Procedures   None    Emergency Department Course & Assessments:    Interventions:  Medications   sodium chloride (PF) 0.9% PF flush 3 mL (has no administration in time range)   sodium chloride (PF) 0.9% PF flush 3 mL (3 mLs Intracatheter Not Given 9/12/23 1722)   vancomycin (VANCOCIN) capsule 125 mg (has no administration in time range)   sodium chloride  0.9% BOLUS 1,000 mL (0 mLs Intravenous Stopped 9/12/23 1722)   sodium chloride 0.9% BOLUS 1,000 mL (0 mLs Intravenous Stopped 9/12/23 1827)   metroNIDAZOLE (FLAGYL) infusion 500 mg (0 mg Intravenous Stopped 9/12/23 1827)   iopamidol (ISOVUE-370) solution 75 mL (75 mLs Intravenous $Given 9/12/23 2029)   Saline Flush (62 mLs Intravenous $Given 9/12/23 2031)     Assessments:  1616 I obtained history and examined the patient as noted above.   1803 I rechecked the patient and explained findings.    Independent Interpretation (X-rays, CTs, rhythm strip):  CXR on my independent interpretation mild blunting of the right and left costophrenic angles, interstitial infiltrates grossly appear to be similar to the chest x-ray from 11/6/2022    Consultations/Discussion of Management or Tests:  2124 I spoke with Dr. Haseeb MD of the hospitalist team regarding the patient, who accepted the patient for admission.     Social Determinants of Health affecting care:   Healthcare Access/Compliance    Disposition:  The patient was admitted to the hospital under the care of Dr. Membreno.     Impression & Plan    CMS Diagnoses: The patient has signs of Severe Sepsis        If one the following conditions is present, a 30 mL/kg bolus is recommended as part of the 6 hour bundle (IBW can be used for BMI >30, or document refusal/contraindication):      1.   Initial hypotension  defined as 2 bps < 90 or map < 65 in the 6hrs before or 3hrs after time zero.     2.  Lactate >4.      The patient has signs of Severe Sepsis as evidenced by:    1. 2 SIRS criteria, AND  2. Suspected infection, AND   3. Organ dysfunction: Lactic Acidosis with value >2.0    Time severe sepsis diagnosis confirmed: 1639 09/12/23 as this was the time when Lactate resulted, and the level was > 2.0    3 Hour Severe Sepsis Bundle Completion:    1. Initial Lactic Acid Result:   Recent Labs   Lab Test 09/12/23  1747 09/12/23  1639 08/17/23  1904   LACT 1.9 3.5* 1.0     2. Blood  Cultures before Antibiotics: Yes  3. Broad Spectrum Antibiotics Administered:   I discussed patient's case with Jackie Moreno in the emergency department, who recommended that given patient's C. difficile positivity, avoid broad-spectrum antibiotics.  We will do Flagyl in case there is any intra-abdominal infection secondary to the C. difficile.       Anti-infectives (From admission through now)      Start     Dose/Rate Route Frequency Ordered Stop    23  vancomycin (VANCOCIN) capsule 125 mg         125 mg Oral ONCE 23  metroNIDAZOLE (FLAGYL) infusion 500 mg        Note to Pharmacy: Metronidazole IV may be dosed more frequently than Q12h for bacteremia, CNS infection and Clostridium difficile.    500 mg  over 60 Minutes Intravenous ONCE 23 [id number] 182            4. Is initial hypotension present?     Yes. (Definition - 2 SBPs <90, MAP <65, or decrease > 40 from baseline due to infection w/in 3 hrs of each other during the time period of 6 hrs before and 3 hrs after time zero)   Full 30 mL/kg bolus given (see amount below).    BMI Readings from Last 1 Encounters:   23 21.55 kg/m      30 mL/kg fluids based on weight: 2,040 mL  30 mL/kg fluids based on IBW (must be >= 60 inches tall): 2,190 mL    Patient received 2000mL of fluid which is 40 mL less than the 30 mL/kg, I did not want to aggressively hydrate patient too much further as he does have a history of heart failure with preserved ejection fraction                    Severe Sepsis reassessment:  1. Repeat Lactic Acid Level within 6 hours of time zero: 1.9  2.  No vasopressors    Medical Decision Makin-year-old male with history of COPD on home oxygen 2 L, C. difficile colitis complicated by recurrent C. Difficile despite outpatient therapy, who presented to the emergency department via EMS after he was found down on the ground surrounded in stool.  Patient initially hypotensive to the mid 80s and  tachycardic to the 130s.  On my review of his EKG, this appeared to be a regular rhythm.  Patient responded well to 2 L of fluid.  His initial lactate was 3.5 which improved to 1.9 after receiving the 2 L of fluid.  Patient was confused however moving all of his extremities, I do not appreciate a facial droop, he had no neglect or other signs of an LVO.  He was grossly covered in stool, patient did have some voluntary tensing with palpation of his abdomen.  His labs were notable for leukocytosis to 20, creatinine grossly at baseline.  His troponin was initially elevated to 90 repeat 2-hour troponin with a negative delta.  Patient continued to be somewhat confused, stated that he remembered coming to the emergency department and ambulance however really reported that the stool was not from him.  CT of the abdomen pelvis did not show any signs of toxic megacolon did show ongoing proctitis and pancolitis.  Patient did meet sepsis criteria thus blood cultures were drawn and in conjunction in discussion with the pharmacist in the emergency department given his recent diagnosis of C. difficile felt that it would be potentially harmful to start broad-spectrum antibiotics.  I suspect that most of his lactate elevation and hypotension was in the setting of significant dehydration from C. difficile diarrhea.  He was discussed with the hospitalist Dr Membreno who kindly accepted him for admission.     Diagnosis:    ICD-10-CM    1. Dehydration  E86.0       2. C. difficile colitis  A04.72            Discharge Medications:  New Prescriptions    No medications on file       Scribe Disclosure:  Della MONK Hailie, am serving as a scribe at 5:00 PM on 9/12/2023 to document services personally performed by Lucy Hopson MD based on my observations and the provider's statements to me.   9/12/2023   Lucy Hopson MD Wu Klasek, Connie, MD  09/12/23 6702

## 2023-09-12 NOTE — ED TRIAGE NOTES
Patient arrived via EMS from home after an unwitnessed fall. EMS found the patient to be hypotensive and tachycardic, covered in stool due to recent diagnosis of C.Diff for which the patient had been taking Vancomycin. Patient stopped taking his ABX because he had been feeling better.     Triage Assessment       Row Name 09/12/23 8179       Triage Assessment (Adult)    Airway WDL WDL       Respiratory WDL    Respiratory WDL X;rhythm/pattern    Rhythm/Pattern, Respiratory shortness of breath       Skin Circulation/Temperature WDL    Skin Circulation/Temperature WDL WDL       Cardiac WDL    Cardiac WDL WDL       Peripheral/Neurovascular WDL    Peripheral Neurovascular WDL WDL       Cognitive/Neuro/Behavioral WDL    Cognitive/Neuro/Behavioral WDL WDL

## 2023-09-12 NOTE — ED NOTES
"[facility]  ED Nurse Handoff Report    ED Chief complaint: Hypotension      ED Diagnosis:   Final diagnoses:   None       Code Status:  Admitting MD to assess.      Allergies:   Allergies   Allergen Reactions    Omeprazole Itching    Pantoprazole Itching    Prevacid [Lansoprazole] Itching    Lasix [Furosemide] Rash    Lidocaine Blisters and Rash     Allergy to lidocaine ointment  Allergy to lidocaine ointment      Penicillin G Rash    Penicillins Rash     \"broke out from injection\" 60 yrs ago  Tolerates cephalosporins       Patient Story:   Patient arrived via EMS from home after an unwitnessed fall. EMS found the patient to be hypotensive and tachycardic, covered in stool due to recent diagnosis of C.Diff for which the patient had been taking Vancomycin. Patient stopped taking his ABX because he had been feeling better.     Focused Assessment:  Patient is alert and oriented x 3, calm and cooperative, vitas signs are improving since arrival but he continues to be tachycardic and mildly hypotensive.        Treatments and/or interventions provided:   Medications   sodium chloride (PF) 0.9% PF flush 3 mL (has no administration in time range)   sodium chloride (PF) 0.9% PF flush 3 mL (has no administration in time range)   sodium chloride 0.9% BOLUS 1,000 mL (has no administration in time range)   piperacillin-tazobactam (ZOSYN) 4.5 g vial to attach to  mL bag (has no administration in time range)   sodium chloride 0.9% BOLUS 1,000 mL (1,000 mLs Intravenous $New Bag 9/12/23 6694)   PO Saad    Patient's response to treatments and/or interventions: Improving, stable at this time.     To be done/followed up on inpatient unit:  Monitor    Does this patient have any cognitive concerns?:  None , slightly confused    Activity level - Baseline/Home:  Independent  Activity Level - Current:   Unknown    Patient's Preferred language: English   Needed?: No    Isolation: Enteric  Infection: C-Diff " (Clostridium Difficile) diagnosis  Patient tested for COVID 19 prior to admission: NO  Bariatric?: No    Vital Signs:   Vitals:    09/12/23 1641 09/12/23 1650 09/12/23 1700 09/12/23 1710   BP: (!) 86/42 107/62 107/62 94/51   Pulse: (!) 134 (!) 141 (!) 137 (!) 131   Resp: 18 22 20 24   Temp: 98.1  F (36.7  C)      TempSrc: Temporal      SpO2: 93% 95% 97% 94%       Cardiac Rhythm:   -  Was the PSS-3 completed:   Yes  What interventions are required if any?               Family Comments: Called Wife. She is aware of pt being admitted  -  OBS brochure/video discussed/provided to patient/family: No              Name of person given brochure if not patient: NA              Relationship to patient: NA    For the majority of the shift this patient's behavior was Green.   Behavioral interventions performed were  information and reassurance provided.  .    ED NURSE PHONE NUMBER: [phone]

## 2023-09-13 NOTE — PLAN OF CARE
Goal Outcome Evaluation:     Summary: Recurrent colitis, c-diff   DATE & TIME: 9/13/23 3805-3107  Cognitive Concerns/ Orientation : A&O x4. Calm & cooperative. [place]. Forgetful  BEHAVIOR & AGGRESSION TOOL COLOR: Green             ABNL VS/O2: VSS on 2L NC (baseline)  MOBILITY:  A x1 GB/W, up to chair  PAIN MANAGMENT: Denies pain  DIET: Regular diet  BOWEL/BLADDER: Multiple  incontinents of loose stools. Sample collected this evening.  ABNL LAB/BG: See lab results  DRAIN/DEVICES: R shoulder PIV infusing NS@100mL/hr w/int.abx other R PIV SL  TELEMETRY RHYTHM: NSR  SKIN: Many small skin tears, skin is very fragile. Scattered bruising, scabbing, and abrasions. Coccyx, heels, and elbows blanchable. Feet flaky, peeling. Mild rash between buttock.  TESTS/PROCEDURES: None  D/C DAY/GOALS/PLACE: Pending improvement. Pt lives at home with his wife- TCU recommended  OTHER IMPORTANT INFO: PO Vanco QID. IV Flagyl q8h. Enteric precautions maintained for C-Diff.

## 2023-09-13 NOTE — PROGRESS NOTES
09/13/23 1108   Appointment Info   Signing Clinician's Name / Credentials (PT) Sophia Villa, PT, DPT   Living Environment   People in Home spouse   Current Living Arrangements house   Home Accessibility stairs to enter home   Number of Stairs, Main Entrance 2   Stair Railings, Main Entrance railings safe and in good condition   Transportation Anticipated family or friend will provide   Living Environment Comments Pt reports he can stay on main floor   Self-Care   Usual Activity Tolerance good   Current Activity Tolerance fair   Regular Exercise No   Equipment Currently Used at Home cane, straight;walker, rolling   Fall history within last six months yes   Number of times patient has fallen within last six months 2   Activity/Exercise/Self-Care Comment Pt reports he is independent at baseline, uses a cane or a walker as needed.   General Information   Onset of Illness/Injury or Date of Surgery 09/12/23   Referring Physician Eli Membreno MD   Patient/Family Therapy Goals Statement (PT) To get stronger   Pertinent History of Current Problem (include personal factors and/or comorbidities that impact the POC) Pt is 89 year old male adm on 9/12/23 via EMS s/p fall. Pt's spouse found him face-down at bottom of stairs. Pt was significantly hypotensive (59/29) and tachycardic (130-160bpm) with EMS. Imaging on admission negative for acute injury. Pt was diagnosed with c-diff in July which seems to have recurred. Pt adm for further management of sepsis. PMH includes adenocarcinoma of right lunc, COPD, PE, centrilobular emphysema, PAF, HTN, CHF, stage III CKD, DVT, GERD, non-Hodgkin's lymphoma, severe sepsis, colitis, AAA.   Existing Precautions/Restrictions fall   Cognition   Affect/Mental Status (Cognition) WFL   Orientation Status (Cognition) oriented to;person;place;situation   Follows Commands (Cognition) WFL   Cognitive Status Comments Pt is hard of hearing, does not exactly recall events leading up to  hospital stay   Pain Assessment   Patient Currently in Pain No   Posture    Posture Forward head position   Range of Motion (ROM)   Range of Motion ROM is WFL   Strength (Manual Muscle Testing)   Strength (Manual Muscle Testing) Deficits observed during functional mobility   Strength Comments Ptis generally deconditioned, no focal weakness, can lift LEs against gravity   Bed Mobility   Comment, (Bed Mobility) SBA   Transfers   Comment, (Transfers) CGA   Gait/Stairs (Locomotion)   Comment, (Gait/Stairs) CGA with FWW   Balance   Balance Comments Overall unsteady, no overt LOB   Sensory Examination   Sensory Perception patient reports no sensory changes   Sensory Perception Comments Baseline neuropathy   Clinical Impression   Criteria for Skilled Therapeutic Intervention Yes, treatment indicated   PT Diagnosis (PT) Decreased mobility   Influenced by the following impairments Decreased strength, decreased balance, decreased activity tolerance   Functional limitations due to impairments Decreased ability to participate in daily tasks   Clinical Presentation (PT Evaluation Complexity) Stable/Uncomplicated   Clinical Presentation Rationale Current presentation, [facility]   Clinical Decision Making (Complexity) low complexity   Planned Therapy Interventions (PT) balance training;bed mobility training;gait training;home exercise program;patient/family education;stair training;strengthening;transfer training   Risk & Benefits of therapy have been explained evaluation/treatment results reviewed;care plan/treatment goals reviewed;risks/benefits reviewed;current/potential barriers reviewed;participants voiced agreement with care plan;participants included;patient   PT Total Evaluation Time   PT Eval, Low Complexity Minutes (91642) 10   Physical Therapy Goals   PT Frequency 5x/week   PT Predicted Duration/Target Date for Goal Attainment 09/23/23   PT: Bed Mobility Independent;Supine to/from sit;Rolling   PT: Transfers Modified  independent;Sit to/from stand;Bed to/from chair;Assistive device   PT: Gait Modified independent;Assistive device;Greater than 200 feet   PT: Stairs Supervision/stand-by assist;2 stairs   Interventions   Interventions Quick Adds Therapeutic Procedure   Therapeutic Procedure/Exercise   Ther. Procedure: strength, endurance, ROM, flexibillity Minutes (57525) 10   Symptoms Noted During/After Treatment none   Treatment Detail/Skilled Intervention Pt completes 10 reps of LAQ, seated hip flexion, toe raises, and heel raises. Pt does report some L foot toe pain with heel raises, reports his feet were bumped into a wall or the elevator, pt unclear on what happened. No bruising or swelling noted. Cues provided throughout exercises for technique.   Therapeutic Activity   Therapeutic Activities: dynamic activities to improve functional performance Minutes (06787) 18   Treatment Detail/Skilled Intervention Pt supine in bed on arrival, in care of  nursing assistants. Pt actively stooling and bed soiled. Pt rolls side to side with SBA and cues for technique. Brief placed and pt assisted OOB. Pt supine to sit with SBA with increased time needed and use of bed rail but no physical assist. Pt denies dizziness with position change. Pt sit to stand with FWW and CGA, cues for hand placement. Pt able to ambulate 10' to bedside chair with FWW and CGA, stooped posture, cues for walker management. Further ambulation held at this time due to pt continues to actively stool and is on c-diff precautions. At completion of session, pt OOB in chair, needs within reach, fall alarm activated.   Symptoms Noted During/After Treatment Fatigue   PT Discharge Planning   PT Plan General strengthening, repeated sit to stands, ambulation with least restrictive assistive device   PT Discharge Recommendation (DC Rec) Transitional Care Facility;home with assist;home with home care physical therapy   PT Rationale for DC Rec At this time, would recommend TCU  prior to return home to increase overall functional independence and safety. Will continue to follow during hospital stay and depending on progress, may be able to return home, will update discharge recommendations as needed. At this time, pt is at increased risk for falls and assist of 1 for safe mobility with FWW.   PT Brief overview of current status CGA with FWW   Total Session Time   Timed Code Treatment Minutes 28   Total Session Time (sum of timed and untimed services) 38

## 2023-09-13 NOTE — PLAN OF CARE
Goal Outcome Evaluation:      Plan of Care Reviewed With: patient    Overall Patient Progress: no change    DATE & TIME: 9/12/23 admission @6015- 8981    Cognitive Concerns/ Orientation : A&O x4. Calm & cooperative. [place]. Forgetful  BEHAVIOR & AGGRESSION TOOL COLOR: Green   ABNL VS/O2: VSS on 2-3L NC (baseline)  MOBILITY: Weak standing at the bedside. A x1 GB/W  PAIN MANAGMENT: Denies  DIET: Regular  BOWEL/BLADDER: Mostly incontinent. External cath in use. Frequent mucous stools, very little actual stool present. Unable to collect sample due to consistency.   ABNL LAB/BG: Creat 1.38, WBC 20.2  DRAIN/DEVICES: R shoulder PIV infusing NS@100mL/hr other R PIV SL  TELEMETRY RHYTHM: NSR  SKIN: Many small skin tears, skin is very fragile. Scattered bruising, scabbing, and abrasions. Coccyx, heels, and elbows blanchable. Feet flaky, peeling. Mild rash between buttock.  TESTS/PROCEDURES: None  D/C DAY/GOALS/PLACE: Pending improvement. Pt lives at home with his wife.  OTHER IMPORTANT INFO: PO Vanco QID. IV Flagyl q8h. ID consulted. Enteric precautions maintained for C-Diff.

## 2023-09-13 NOTE — PROGRESS NOTES
"CLINICAL NUTRITION SERVICES  -  ASSESSMENT NOTE      Recommendations Ordered by Registered Dietitian (RD): Vanilla Ensure trial and can order prn.   Malnutrition: % Weight Loss:  Weight loss does not meet criteria for malnutrition  % Intake:  Decreased intake does not meet criteria for malnutrition  Subcutaneous Fat Loss:  Orbital region mild depletion and Upper arm region moderate depletion  Muscle Loss:  Temporal region moderate depletion, Clavicle bone region moderate-severe depletion, Acromion bone region moderate-severe depletion, Dorsal hand region mild depletion, and Posterior calf region moderate depletion  Fluid Retention:  None noted    Malnutrition Diagnosis: Moderate malnutrition  In Context of:  Chronic illness or disease      REASON FOR ASSESSMENT  Hermilo Velasquez is a 89 year old male seen by Registered Dietitian for Admission Nutrition Risk Screen     NUTRITION HISTORY  - Information obtained from pt and chart  - Typical food/fluid intake is 3 meals/day, reporting his appetite to be \"not bad.\" A usual breakfast is shredded wheat, lunch will be a burger and pepsi, and dinner is pancakes, without any protein.    - Supplements - pt's son consumes Boost and pt is open to trying a vanilla Ensure but wants to order them prn  Pt reported UBW of 170# 3-5 yrs ago and more recently 165#. Pt reported wt loss, which was worsened with the C. Diff. Infection.    PMH of recurrent C. difficile colitis, adenocarcinoma of the right lung, COPD with chronic hypoxic respiratory failure using oxygen at home, history of emphysema, DVT/PE, paroxysmal atrial fibrillation, hypertension, CKD stage III, dyslipidemia, BPH, non-Hodgkin lymphoma, gastroesophageal reflux disease who was brought in to ER for evaluation of hypotension and diarrhea.     CURRENT NUTRITION ORDERS  Diet Order:     Regular     Current Intake/Tolerance:  Pt reported some difficulty chewing and sometimes does \"not care\" to eat. Pt has ordered 1 meal " "since admission.     BMs - 3x, large - 9/12-13.     NUTRITION FOCUSED PHYSICAL ASSESSMENT FOR DIAGNOSING MALNUTRITION)  Yes         Observed:    Muscle wasting (refer to documentation in Malnutrition section) and Subcutaneous fat loss (refer to documentation in Malnutrition section)    ANTHROPOMETRICS  Height: 5' 10\"  Weight: 152 lbs 12.46 oz (69.4 kg)  Body mass index is 21.92 kg/m .  Weight Status:  Normal BMI  IBW: 166 lbs (75.5 kg)  % IBW: 92%  Weight History:   Wt Readings from Last 10 Encounters:   09/13/23 69.3 kg (152 lb 12.5 oz)   09/07/23 68.1 kg (150 lb 3.2 oz)   08/29/23 68.9 kg (151 lb 14.4 oz)   08/17/23 67.4 kg (148 lb 9.4 oz)   07/17/23 68.9 kg (152 lb)   06/20/23 67.4 kg (148 lb 8 oz)   05/17/23 70 kg (154 lb 4.8 oz)   03/16/23 70.8 kg (156 lb)   12/28/22 67.7 kg (149 lb 3.2 oz)   12/02/22 72.1 kg (158 lb 14.4 oz)     LABS  Creatinine H - 1.38, GFR L - 49, Ca+ L - 7.9, BG H - 128,      MEDICATIONS  Ferosul  NaCl IVF @ 100 ml/hr    ASSESSED NUTRITION NEEDS PER APPROVED PRACTICE GUIDELINES:  Dosing Weight: 69.4 kg  Estimated Energy Needs: 4033-9061 kcals (25-30 Kcal/Kg)  Justification: maintenance  Estimated Protein Needs: 70-83 grams protein (1-1.2 g pro/Kg)  Justification: maintenance and increased needs  Estimated Fluid Needs:  1 mL/Kcal  Justification: maintenance and per provider pending fluid status    MALNUTRITION:  % Weight Loss:  Weight loss does not meet criteria for malnutrition  % Intake:  Decreased intake does not meet criteria for malnutrition  Subcutaneous Fat Loss:  Orbital region mild depletion and Upper arm region moderate depletion  Muscle Loss:  Temporal region moderate depletion, Clavicle bone region moderate-severe depletion, Acromion bone region moderate-severe depletion, Dorsal hand region mild depletion, and Posterior calf region moderate depletion  Fluid Retention:  None noted    Malnutrition Diagnosis: Moderate malnutrition  In Context of:  Chronic illness or " disease    NUTRITION DIAGNOSIS:  Malnutrition related to altered GI function and diarrhea likely causing malabsorption as evidenced by variable PO intake appetite, moderate muscle and fat wasting.     NUTRITION INTERVENTIONS  Recommendations / Nutrition Prescription  Encouraged protein intake to preserve lean muscle mass, begin Vanilla Ensure trial and can order prn.    Implementation  Nutrition education: Provided education on the importance of protein intake to preserve lean muscle mass  Medical Food Supplement - as ordered    Nutrition Goals  Pt to consume % of meals TID and oral supplements prn    MONITORING AND EVALUATION:  Progress towards goals will be monitored and evaluated per protocol and Practice Guidelines    Mo Todd RD

## 2023-09-13 NOTE — CONSULTS
Care Management Initial Consult    General Information  Assessment completed with: Patient, Spouse or significant other, pt and pt's wife  Type of CM/SW Visit: Initial Assessment    Primary Care Provider verified and updated as needed:     Readmission within the last 30 days: unable to assess      Reason for Consult: discharge planning  Advance Care Planning:            Communication Assessment  Patient's communication style: spoken language (English or Bilingual)    Hearing Difficulty or Deaf: yes   Wear Glasses or Blind: yes    Cognitive  Cognitive/Neuro/Behavioral: WDL                      Living Environment:   People in home: significant other  Pt's wife  Current living Arrangements: house      Able to return to prior arrangements: other (see comments) (Pt's wife states that pt has been strongly against TCU and she feels that pt would want to return home iwth home care instead.)       Family/Social Support:  Care provided by: self  Provides care for: no one  Marital Status:   Wife          Description of Support System: Supportive    Support Assessment: Adequate family and caregiver support    Current Resources:   Patient receiving home care services: No     Community Resources: None  Equipment currently used at home: cane, straight, walker, rolling  Supplies currently used at home: None    Employment/Financial:  Employment Status:          Financial Concerns: No concerns identified   Referral to Financial Worker: No       Does the patient's insurance plan have a 3 day qualifying hospital stay waiver?  Yes   Will the waiver be used for post-acute placement? Yes    Lifestyle & Psychosocial Needs:  Social Determinants of Health     Tobacco Use: Medium Risk (9/7/2023)    Patient History     Smoking Tobacco Use: Former     Smokeless Tobacco Use: Never     Passive Exposure: Not on file   Alcohol Use: Not on file   Financial Resource Strain: Low Risk  (5/12/2020)    Overall Financial Resource Strain (CARDIA)      Difficulty of Paying Living Expenses: Not hard at all   Food Insecurity: No Food Insecurity (5/12/2020)    Hunger Vital Sign     Worried About Running Out of Food in the Last Year: Never true     Ran Out of Food in the Last Year: Never true   Transportation Needs: No Transportation Needs (5/12/2020)    PRAPARE - Transportation     Lack of Transportation (Medical): No     Lack of Transportation (Non-Medical): No   Physical Activity: Inactive (5/12/2020)    Exercise Vital Sign     Days of Exercise per Week: 0 days     Minutes of Exercise per Session: 0 min   Stress: No Stress Concern Present (5/12/2020)    [employer] - Occupational Stress Questionnaire     Feeling of Stress : Not at all   Social Connections: Moderately Isolated (5/12/2020)    Social Connection and Isolation Panel [NHANES]     Frequency of Communication with Friends and Family: Twice a week     Frequency of Social Gatherings with Friends and Family: Twice a week     Attends Restorationism Services: Never     Active Member of Clubs or Organizations: No     Attends Club or Organization Meetings: Never     Marital Status:    Intimate Partner Violence: Not At Risk (5/12/2020)    Humiliation, Afraid, Rape, and Kick questionnaire     Fear of Current or Ex-Partner: No     Emotionally Abused: No     Physically Abused: No     Sexually Abused: No   Depression: Not at risk (9/7/2023)    PHQ-2     PHQ-2 Score: 0   Housing Stability: Not on file       Functional Status:  Prior to admission patient needed assistance:   Dependent ADLs:: Independent  Dependent IADLs:: Independent  Assesssment of Functional Status: Not at baseline with ADL Functioning    Mental Health Status:          Chemical Dependency Status:                Values/Beliefs:  Spiritual, Cultural Beliefs, Restorationism Practices, Values that affect care:                 Additional Information:  Pt is a 89 year old male who came into the hospital after an unwitnessed fall  per Dr. Jignesh Vega ED  note on 9/12.    Writer met with pt at bedside. Pt is hard of hearing and notes this to writer. Introduced self and role. Pt is requesting that writer call his wife to discuss discharge planning with her.    Writer placed phone call to pt's wife Roberta. Roberta states that pt lives at home with her in a house. She states that pt resides in one level of the home where he can get all his needs met. Roberta states that pt was independent in ADL's at home. Roberta states pt was not using any home care services. Roberta states pt did use a cane and at times a walker to mobilize at home. Writer discussed therapy recommendation for TCU with Roberta. Roberta states that she does not feel TCU is necessary. Roberta states pt attended TCU in the past. Roberta states pt has told her that he would not attend TCU again. Roberta states it may be best for pt to return home with home care services. Writer asked Roberta if she had been in to see pt since he was hospitalized. Roberta is planning to come into hospital today. Writer asked Roberta that she observe with nursing help pt's mobility and then she and pt discuss if they are interested in TCU. Roberta states understanding. Roberta states she will do this today and discuss this with pt today. Roberta states no questions or concerns at this time.   Writer spoke with care coordinator who states pt had received [facility] care home care previously but is not sure if they are receiving this currently.    Writer phone call to [facility] home care Maria L. No answer. Writer left voicemail  and asked to see if she can call writer back to notify writer if [facility] is still working with pt or if pt is no longer using their services.       Jayda Herr, BSW  Social Work  [facility]

## 2023-09-13 NOTE — PROGRESS NOTES
[facility]    Medicine Progress Note - Hospitalist Service    Date of Admission:  9/12/2023    Assessment & Plan     Hermilo Velasquez is a 89 year old male with past medical history of recurrent C. difficile colitis, adenocarcinoma of the right lung, COPD with chronic hypoxic respiratory failure using oxygen at home, history of emphysema, DVT/PE, paroxysmal atrial fibrillation, hypertension, CKD stage III, dyslipidemia, BPH, non-Hodgkin lymphoma, gastroesophageal reflux disease who was brought in to ER for evaluation of hypotension and diarrhea.     # Recurrent C. difficile colitis  # Sepsis, due to above  - he tested positive for C. difficile colitis initially in July 2023 with improvement of diarrhea after initial 10 days course of oral vancomycin.  He had recurrent diarrhea in August and she was admitted to the hospital from 8/16 through 8/19.  He was seen by ID who recommended prolonged vancomycin taper- 250 mg 4 times daily for 7 days then 125 mg 4 times daily for 2 weeks then 125 twice daily for 2 weeks then 125 daily for 4 weeks then 125 every other day for 4 weeks then stop and watch. Be ready to restart immediately if diarrhea relapses.   -Apparently his diarrhea got better and he stopped taking vancomycin  -Started having diarrhea again few days ago; he had an unwitnessed fall earlier today; upon EMS arrival he was hypotensive and tachycardic; he responded well to fluid resuscitation  -In ER blood pressure was initially still on lower side but improved to 106/59, heart rate also improved from 130s to 106.  -Lactic acid initially elevated at 3.5--improved to 1.9; white blood cells elevated 25.5  -CT abdomen- Proctitis and pancolitis which may reflect an infectious or inflammatory process. No abscess or free air.  -Contact isolation  -Stool sample to check for C. Difficile  -ER attending discussed with pharmacy who recommended to resume oral vancomycin and start IV  Flagyl  -Vancomycin 125 mg p.o. 4 times daily and Flagyl 500 mg IV every 8 hours ordered  -ID consulted  -Will need GI consult for fecal transplant  -Monitor fever curve and white blood cells trend     # Unwitnessed fall  # Hypotension  -He had an unwitnessed fall at home; found to be hypotensive by EMS  -Blood pressure was a still on lower side upon arrival in ER; improved with the IV fluids and remained stable  -Hypotension is likely related to sepsis and dehydration  -CT head -negative for acute pathology  -Monitor on telemetry  -Check orthostasis in the morning  -Fall precaution  -PT/OT evaluation     # History of adenocarcinoma of the right lung  # New lung nodules  -He follows with [facility]  - s/p RLL wedge resection by Dr Vasques   -CT chest on 9/11 (outpatient ) showed- Multiple new pulmonary nodules are present. One of these nodules measuring 1.8 cm is located adjacent to surgical sutures in the right lower lobe and concerning for recurrent malignancy. Multiple other new pulmonary nodules are seen bilaterally that  may reflect worsening metastatic disease. Differential diagnosis includes atypical infection. Recommend close attention on follow-up exam; Enlarging small right pleural effusion.  -Hem on consult     # COPD  # Chronic hypoxic respiratory failure on oxygen 2 L  -Respiratory status seems at baseline  -Resume prior to admission inhalers     # History of severe aortic stenosis, s/p bioprosthetic aortic valve replacement  # Dyslipidemia  -Resume PTA statin     # Elevated troponin, likely demand ischemia  -Noted to have mildly elevated troponins of 97 on admission but trending down 83--80  -He denies chest pain  -This likely represents demand ischemia in the setting of severe sepsis and hypotension  -Monitor on telemetry  - Echocardiogram     # FRED on CKD stage 3  -Baseline creatinine around 1.2-1.4  -Creatinine on admission 1.59.  Now improved to 1.38 with IV fluids  -Started on normal  saline at 100 cc/h, will decrease to 50 mL/h  -Avoid nephrotoxic drugs  -BMP in a.m.     # GERD  -Resume prior to admission Pepcid     # BPH  -Prior to admission Flomax.     Diet: Combination Diet Regular Diet Adult    DVT Prophylaxis: Pneumatic Compression Devices  Suazo Catheter: Not present  Lines: None     Cardiac Monitoring: ACTIVE order. Indication: Tachyarrhythmias, acute (48 hours)  Code Status: Full Code      Clinically Significant Risk Factors Present on Admission          # Hypocalcemia: Lowest Ca = 7.9 mg/dL in last 2 days, will monitor and replace as appropriate     # Hypoalbuminemia: Lowest albumin = 3.3 g/dL at 9/12/2023  4:28 PM, will monitor as appropriate     # Hypertension: Noted on problem list  # Chronic heart failure with preserved ejection fraction: heart failure noted on problem list and last echo with EF >50%      # Moderate Malnutrition: based on nutrition assessment     # COPD: noted on problem list        Disposition Plan      Expected Discharge Date: 09/15/2023      Destination: other (comment) (Pt's wife states pt has stated in the past he is not wanting to attend a TCU again. She feels pt would rather go home with home care.)            Glenny Kelly MD  Hospitalist Service  [facility]  Securely message with [employer] (more info)  Text page via hint Paging/Directory   ______________________________________________________________________    Interval History   Reports he is doing okay.  Continues to have diarrhea.  Reports abdomen is firm and distended.  Afebrile.  Vital signs stable    Physical Exam   Vital Signs: Temp: 97.6  F (36.4  C) Temp src: Oral BP: 124/62 Pulse: 85   Resp: 20 SpO2: 94 % O2 Device: Nasal cannula Oxygen Delivery: 3 LPM  Weight: 152 lbs 12.46 oz    Constitutional - awake and alert, resting in bed, in no acute distress  GI - abdomen is soft, nontender, nondistended  Integumentary - skin is warm and dry, no rashes or ulcers  Neurological -  awake, alert and oriented x3.  Moving all 4 extremities, normal speech, no focal deficits    Medical Decision Making       ------------------ MEDICAL DECISION MAKING ------------------------------------------------------------------------------------------------------  Medical complexity over the past 24 hours:  -------------------------- MODERATE RISK FOR MORBIDITY --------------------------------------------------      Data     I have personally reviewed the following data over the past 24 hrs:    20.2 (H)  \   10.1 (L)   / 142 (L)     139 108 (H) 22.8 /  128 (H)   3.8 21 (L) 1.38 (H) \     ALT: 16 AST: 13 AP: 122 TBILI: 0.6   ALB: 3.3 (L) TOT PROTEIN: 6.4 LIPASE: 12 (L)     Trop: 80 (H) BNP: N/A     Procal: N/A CRP: N/A Lactic Acid: 1.9       INR:  1.15 PTT:  N/A   D-dimer:  N/A Fibrinogen:  N/A       Imaging results reviewed over the past 24 hrs:   Recent Results (from the past 24 hour(s))   XR Chest Port 1 View    Narrative    EXAM: XR CHEST PORT 1 VIEW  LOCATION: [facility]  DATE: 9/12/2023    INDICATION: Hypoxia.  COMPARISON: 08/16/2023.      Impression    IMPRESSION: Pulmonary emphysema and chronic interstitial changes, with reticulonodular appearance in the right lung base. Superimposed mild interstitial edema is difficult to exclude.     Suture material also overlies the right lung base. Small right pleural effusion. No pneumothorax.    Mild cardiomegaly. Atherosclerosis of the thoracic aorta. Postsurgical changes of median sternotomy. Aortic valve prosthesis.    Suture anchor at the left proximal humerus.   Head CT w/o contrast    Narrative    EXAM: CT HEAD W/O CONTRAST  LOCATION: [facility]  DATE: 9/12/2023    INDICATION: Found on bathroom floor likely syncope iso C diff and significant volume loss.  COMPARISON: Brain MRI 06/28/2023.  TECHNIQUE: Routine CT Head without IV contrast. Multiplanar reformats. Dose reduction techniques were  used.    FINDINGS:  INTRACRANIAL CONTENTS: No intracranial hemorrhage, extraaxial collection, or mass effect.  No CT evidence of acute infarct. Mild presumed chronic small vessel ischemic changes. Moderate generalized volume loss. No hydrocephalus.     VISUALIZED ORBITS/SINUSES/MASTOIDS: No intraorbital abnormality. No paranasal sinus mucosal disease. No middle ear or mastoid effusion.    BONES/SOFT TISSUES: No acute abnormality.      Impression    IMPRESSION:  1.  No CT evidence for acute intracranial process.  2.  Brain atrophy and presumed chronic microvascular ischemic changes as above.   Abd/pelvis CT,  IV  contrast only TRAUMA / AAA    Narrative    EXAM: CT ABDOMEN PELVIS W CONTRAST  LOCATION: [facility]  DATE: 9/12/2023    INDICATION: Found down syncope, hx of c diff infection  COMPARISON: CT 8/17/2023 and 10/9/2022  TECHNIQUE: CT scan of the abdomen and pelvis was performed following injection of IV contrast. Multiplanar reformats were obtained. Dose reduction techniques were used.  CONTRAST: 75 mL Isovue 370    FINDINGS:   LOWER CHEST: Sternotomy with aortic valve replacement. Trace bilateral pleural effusions with adjacent atelectasis, right greater than left. Bibasilar emphysema and fibrotic changes. Stable 7 mm left lower lobe nodule. Recommend attention on follow-up.   The other previously noted nodule is not well seen on this exam.    HEPATOBILIARY: Stable small hypodense hepatic lesions, likely cysts. Normal gallbladder and bile ducts.    PANCREAS: Stable atrophy.    SPLEEN: Normal.    ADRENAL GLANDS: Normal.    KIDNEYS/BLADDER: Stable atrophy and symmetric bilateral perinephric stranding which is likely chronic. No hydronephrosis. Moderately distended bladder.    BOWEL: Small hiatal hernia. Ventral hernia repair. Normal caliber small bowel without focal mural thickening. Appendectomy. Pancolitis with moderate diffuse colonic wall thickening and mild surrounding edema.  Similar inflammatory mural thickening and   edema involving the rectum. Mild mesenteric and body wall edema. No free air or free fluid.    LYMPH NODES: Normal.    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 Moderate to severe aortoiliac atherosclerosis. Patent central SMA and SMV.    PELVIC ORGANS: Normal.    MUSCULOSKELETAL: Spinal and pelvic degenerative changes. Osseous demineralization. Lower lumbar laminectomy. Stable L4 vertebral body compression fracture.      Impression    IMPRESSION:   1.  Proctitis and pancolitis which may reflect an infectious or inflammatory process. No abscess or free air.  2.  Trace bilateral pleural effusions.

## 2023-09-13 NOTE — PROGRESS NOTES
RECEIVING UNIT ED HANDOFF REVIEW    ED Nurse Handoff Report was reviewed by: Caitlin Jean-Baptiste RN on September 13, 2023 at 12:33 AM

## 2023-09-13 NOTE — CONSULTS
[facility]    Infectious Disease Consultation     Date of Admission:  9/12/2023  Date of Consult : 09/13/23      Assessment:  89 YM with multiple medical conditions, hx of C.diff colitis in July with relapse in August, was to be on prolonged pulse oral vancomycin taper, but discontinued vancomycin as diarrhea improved, and has now been admitted with recurrent diarrhea, leukocytosis and hypotension related to C.diff relapse    -Recurrent C.diff colitis with hypotension and marked leukocytosis  -FRED  -COPD and right lung adenocarcinoma  -Atrial fibrillation  -Chronic kidney disease  -chronic medical conditions- hx of DVT/PE, atrial fibrillation, HTN, BPH, non Hodgkin lymphoma, GERD    Recommendations:  Treat with oral vancomycin 125 mg PO QID, will require prolonged pulse taper  Reasonable to maintain on Metronidazole for now until clinical improvement  Will need GI evaluation for FMT  Monitor clinical status and labs and monitor for complications of c.diff colitis    Galilea Moses MD    Reason for Consult   Reason for consult: I was asked to evaluate this patient for recurrent C.diff colitis.    Primary Care Physician   Karson Bishop    Chief Complaint   Abdominal pain and diarrhea    History is obtained from the patient and medical records    History of Present Illness   Hermilo Velasquez is a 89 year old male who has had recent hospitalizations for C.diff colitis with recurrence , initially treated with a 10 day course of oral vancomycin in July, with rapid relapse upon completion of therapy, admitted 8/16 with recurrent c.diff and was prescribed a prolonged vancomycin pulse taper. Was still supposed to be on vancomycin but as diarrhea improved he stopped vancomycin. He has now been hospitalized with recurrent diarrhea and hypotension related to C.diff relapse    On presentation to the ED he was hypotensive and tachycardic. He has remained afebrile but has leukocytosis of 25.5K and Imaging  findings of proctitis and pancolitis.    Patient has been initiated on vancomycin and metronidazole and ID has been asked to assist with antibiotic management.     Past Medical History   I have reviewed this patient's medical history and updated it with pertinent information if needed.   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 Vasques     Aortic stenosis     Severe AS, 9/2015 AVR - [facility] TRIFECTA Bovine bioprosthesis 25MM TF-[id number]    Atrial fibrillation (H)     9/2015 Paroxysmal post op Afib - discharged on Warfarin and a beta blocker    COPD (chronic obstructive pulmonary disease) (H)     Deep vein thrombosis (H)     GERD (gastroesophageal reflux disease)     Heart murmur     Monoclonal gammopathy     plasmacyte prominent causing monoclonal gammopathy    Need for SBE (subacute bacterial endocarditis) prophylaxis     Neuropathy     Other and unspecified hyperlipidemia     Other malignant lymphomas     non hodgkin's lymphoma    RBBB (right bundle branch block)     Severe sepsis with acute organ dysfunction (H) 11/16/2015    Unspecified hereditary and idiopathic peripheral neuropathy        Past Surgical History   I have reviewed this patient's surgical history and updated it with pertinent information if needed.  Past Surgical History:   Procedure Laterality Date    APPENDECTOMY      ARTHROPLASTY KNEE Right 7/25/2022    Procedure: RIGHT TOTAL KNEE ARTHROPLASTY;  Surgeon: Giuliano Dehspande MD;  Location:  OR    AS TOTAL KNEE ARTHROPLASTY      BACK SURGERY      ESOPHAGOSCOPY, GASTROSCOPY, DUODENOSCOPY (EGD), COMBINED N/A 11/28/2015    Procedure: COMBINED ESOPHAGOSCOPY, GASTROSCOPY, DUODENOSCOPY (EGD);  Surgeon: Danis Castillo MD;  Location:  GI    ESOPHAGOSCOPY, GASTROSCOPY, DUODENOSCOPY (EGD), COMBINED N/A 7/26/2018    Procedure: COMBINED ESOPHAGOSCOPY, GASTROSCOPY, DUODENOSCOPY (EGD);;  Surgeon: Toby Dong DO;   Location:  GI    ESOPHAGOSCOPY, GASTROSCOPY, DUODENOSCOPY (EGD), COMBINED N/A 8/17/2020    Procedure: ESOPHAGOGASTRODUODENOSCOPY (EGD);  Surgeon: Herrera Henry MD;  Location:  GI    ESOPHAGOSCOPY, GASTROSCOPY, DUODENOSCOPY (EGD), COMBINED N/A 10/24/2020    Procedure: ESOPHAGOGASTRODUODENOSCOPY (EGD);  Surgeon: Vick Florentino MD;  Location:  GI    ESOPHAGOSCOPY, GASTROSCOPY, DUODENOSCOPY (EGD), COMBINED N/A 4/11/2022    Procedure: ESOPHAGOGASTRODUODENOSCOPY (EGD);  Surgeon: Vick Florentino MD;  Location:  GI    ESOPHAGOSCOPY, GASTROSCOPY, DUODENOSCOPY (EGD), COMBINED N/A 8/26/2022    Procedure: ESOPHAGOGASTRODUODENOSCOPY (EGD);  Surgeon: El Mcgovern MD;  Location:  GI    HERNIA REPAIR  2006    HERNIORRHAPHY VENTRAL  4/17/2013    Procedure: HERNIORRHAPHY VENTRAL;  VENTRAL HERNIA REPAIR WITH MESH;  Surgeon: Patel Guzman MD;  Location:  OR    KNEE SURGERY      arthroscopic right knee surgery     LOBECTOMY LUNG Right 3/26/2019    POSSIBLE LOBECTOMY LUNG per Dr. [facility]ques at [facility]    REPAIR LIGAMENT ANKLE  2/23/2012    Procedure:REPAIR LIGAMENT ANKLE; LEFT TARSAL TUNNEL RELEASE OF KNOT OF ARIEL RELEASE; Surgeon:SAUL PUENTE; Location: OR    REPLACE VALVE AORTIC N/A 9/3/2015    Procedure: REPLACE VALVE AORTIC;  Surgeon: Antonino Mitchell MD;  Location:  OR    ROTATOR CUFF REPAIR RT/LT      bilateral    SPINE SURGERY      3 spine surgeries    THORACOTOMY, WEDGE RESECTION LUNG, COMBINED Right 3/26/2019    RIGHT THORACOTOMY, WEDGE RESECTION, RIGHT LOWER LOBE LUNG NODULE,;  Surgeon: Jose A Vasques MD;  Location:  OR    TONSILLECTOMY      TURP         Prior to Admission Medications   Prior to Admission Medications   Prescriptions Last Dose Informant Patient Reported? Taking?   DULoxetine (CYMBALTA) 60 MG capsule Past Week Self Yes Yes   Sig: Take 60 mg by mouth daily   acetaminophen (TYLENOL) 500 MG tablet prn at prn Self Yes Yes   Sig: Take 1,000  mg by mouth 3 times daily as needed   albuterol (PROAIR HFA/PROVENTIL HFA/VENTOLIN HFA) 108 (90 Base) MCG/ACT inhaler prn at prn Self Yes Yes   Sig: Inhale 2 puffs into the lungs every 6 hours as needed   albuterol (PROVENTIL) (2.5 MG/3ML) 0.083% neb solution prn at prn Self No Yes   Sig: Take 1 vial (2.5 mg) by nebulization every 6 hours as needed for shortness of breath / dyspnea or wheezing   atorvastatin (LIPITOR) 10 MG tablet Past Week Self No Yes   Sig: Take 1 tablet (10 mg) by mouth daily   famotidine (PEPCID) 40 MG tablet Past Week Self No Yes   Sig: Take 1 tablet (40 mg) by mouth 2 times daily   Patient taking differently: Take 40 mg by mouth daily   ferrous sulfate (FE TABS) 325 (65 Fe) MG EC tablet Past Week Self Yes Yes   Sig: Take 1 tablet (325 mg) by mouth 2 times daily   fluticasone-vilanterol (BREO ELLIPTA) 200-25 MCG/ACT inhaler not taking at not taking Self Yes No   Sig: Inhale 1 puff into the lungs daily   gabapentin (NEURONTIN) 300 MG capsule Past Week Self No Yes   Sig: Take 3 capsules (900 mg) by mouth At Bedtime For neuropathy pain   Patient taking differently: Take 600 mg by mouth At Bedtime For neuropathy pain   tamsulosin (FLOMAX) 0.4 MG capsule unknown  Yes No   Sig: Take 0.4 mg by mouth daily   tiotropium (SPIRIVA) 18 MCG inhaled capsule Past Week Self Yes Yes   Sig: Inhale 18 mcg into the lungs daily   vancomycin (VANCOCIN) 125 MG capsule Past Week Self No Yes   Sig: vancomycin prolonged taper:  250 4 times daily for 7 days then 125 mg 4 times daily for 2 weeks then 125 twice daily for 2 weeks then 125 daily for 4 weeks then 125 every other day for 4 weeks then stop and watch. Be  ready to restart immediately if diarrhea relapses.      Facility-Administered Medications: None     Allergies   Allergies   Allergen Reactions    Omeprazole Itching    Pantoprazole Itching    Prevacid [Lansoprazole] Itching    Lasix [Furosemide] Rash    Lidocaine Blisters and Rash     Allergy to lidocaine  "ointment  Allergy to lidocaine ointment      Penicillin G Rash    Penicillins Rash     \"broke out from injection\" 60 yrs ago  Tolerates cephalosporins       Immunization History   Immunization History   Administered Date(s) Administered    COVID-19 Bivalent 12+ (Pfizer) 11/23/2022, 06/20/2023    COVID-19 MONOVALENT 12+ (Pfizer) 01/20/2021, 02/10/2021, 10/15/2021    COVID-19 Monovalent 12+ (Pfizer 2022) 04/26/2022    DT (PEDS <7y) 07/25/1996    Flu, Unspecified 02/01/2023    Influenza (H1N1) 01/20/2010    Influenza (High Dose) 3 valent vaccine 11/03/2014, 10/01/2015, 10/01/2016, 11/08/2016, 11/02/2017, 12/10/2018, 10/11/2019    Influenza (IIV3) PF 09/23/2009, 10/01/2019    Influenza Vaccine 65+ (Fluzone HD) 10/15/2020, 09/28/2021, 11/23/2022, 09/07/2023    Pneumo Conj 13-V (2010&after) 11/08/2014    Pneumococcal 23 valent 07/01/2014    TD,PF 7+ (Tenivac) 06/01/2012    TDAP Vaccine (Adacel) 06/01/2012    Td (Adult), Adsorbed 06/01/2012    Zoster recombinant adjuvanted (SHINGRIX) 12/10/2018, 07/21/2019    Zoster vaccine, live 07/01/2014, 07/01/2019       Social History   I have reviewed this patient's social history and updated it with pertinent information if needed. Hermilo Velasquez  reports that he quit smoking about 25 years ago. His smoking use included cigarettes. He has a 110.00 pack-year smoking history. He has never used smokeless tobacco. He reports that he does not currently use alcohol. He reports that he does not use drugs.    Family History   I have reviewed this patient's family history and updated it with pertinent information if needed.   Family History   Problem Relation Age of Onset    C.A.D. Father     Emphysema Father     Melanoma No family hx of     Skin Cancer No family hx of        Review of Systems   The 10 point Review of Systems is negative    Physical Exam   Temp: 97.6  F (36.4  C) Temp src: Oral BP: 124/62 Pulse: 85   Resp: 20 SpO2: 94 % O2 Device: Nasal cannula Oxygen Delivery: 3 " LPM  Vital Signs with Ranges  Temp:  [97.5  F (36.4  C)-98.1  F (36.7  C)] 97.6  F (36.4  C)  Pulse:  [] 85  Resp:  [12-28] 20  BP: ()/(42-74) 124/62  SpO2:  [90 %-99 %] 94 %  152 lbs 12.46 oz  Body mass index is 21.92 kg/m .    GENERAL APPEARANCE:  awake  EYES: Eyes grossly normal to inspection  NECK: no adenopathy  RESP: lungs clear   CV: regular rates and rhythm  LYMPHATICS: normal ant/post cervical and supraclavicular nodes  ABDOMEN: soft, nontender  MS: extremities normal  SKIN: no suspicious lesions or rashes        Data   All laboratory data reviewed     Component      Latest Ref Rn 9/13/2023  4:59 AM   Sodium      136 - 145 mmol/L 139    Potassium      3.4 - 5.3 mmol/L 3.8    Chloride      98 - 107 mmol/L 108 (H)    Carbon Dioxide (CO2)      22 - 29 mmol/L 21 (L)    Anion Gap      7 - 15 mmol/L 10    Urea Nitrogen      8.0 - 23.0 mg/dL 22.8    Creatinine      0.67 - 1.17 mg/dL 1.38 (H)    Calcium      8.2 - 9.6 mg/dL 7.9 (L)    Glucose      70 - 99 mg/dL 128 (H)    GFR Estimate      >60 mL/min/1.73m2 49 (L)       Component      Latest Ref Rng 9/13/2023  4:58 AM   WBC      4.0 - 11.0 10e3/uL 20.2 (H)    RBC Count      4.40 - 5.90 10e6/uL 3.69 (L)    Hemoglobin      13.3 - 17.7 g/dL 10.1 (L)    Hematocrit      40.0 - 53.0 % 31.3 (L)    MCV      78 - 100 fL 85    MCH      26.5 - 33.0 pg 27.4    MCHC      31.5 - 36.5 g/dL 32.3    RDW      10.0 - 15.0 % 14.3    Platelet Count      150 - 450 10e3/uL 142 (L)       Component      Latest Ref Rng 9/13/2023  4:59 AM   Troponin T, High Sensitivity      <=22 ng/L 80 (H)       Prior  Component      Latest Ref Rng 8/17/2023  4:39 AM   C. difficile GDH Antigen      Negative  Positive !    C. Difficile Toxin      Negative  Positive !    C Difficile Toxin B by PCR      Negative  Positive !       Imaging  EXAM: CT ABDOMEN PELVIS W CONTRAST  LOCATION: [facility]  DATE: 9/12/2023     INDICATION: Found down syncope, hx of c diff  infection  COMPARISON: CT 8/17/2023 and 10/9/2022  TECHNIQUE: CT scan of the abdomen and pelvis was performed following injection of IV contrast. Multiplanar reformats were obtained. Dose reduction techniques were used.  CONTRAST: 75 mL Isovue 370     FINDINGS:   LOWER CHEST: Sternotomy with aortic valve replacement. Trace bilateral pleural effusions with adjacent atelectasis, right greater than left. Bibasilar emphysema and fibrotic changes. Stable 7 mm left lower lobe nodule. Recommend attention on follow-up.   The other previously noted nodule is not well seen on this exam.     HEPATOBILIARY: Stable small hypodense hepatic lesions, likely cysts. Normal gallbladder and bile ducts.     PANCREAS: Stable atrophy.     SPLEEN: Normal.     ADRENAL GLANDS: Normal.     KIDNEYS/BLADDER: Stable atrophy and symmetric bilateral perinephric stranding which is likely chronic. No hydronephrosis. Moderately distended bladder.     BOWEL: Small hiatal hernia. Ventral hernia repair. Normal caliber small bowel without focal mural thickening. Appendectomy. Pancolitis with moderate diffuse colonic wall thickening and mild surrounding edema. Similar inflammatory mural thickening and   edema involving the rectum. Mild mesenteric and body wall edema. No free air or free fluid.     LYMPH NODES: Normal.     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 Moderate to severe aortoiliac atherosclerosis. Patent central SMA and SMV.     PELVIC ORGANS: Normal.     MUSCULOSKELETAL: Spinal and pelvic degenerative changes. Osseous demineralization. Lower lumbar laminectomy. Stable L4 vertebral body compression fracture.                                                                      IMPRESSION:   1.  Proctitis and pancolitis which may reflect an infectious or inflammatory process. No abscess or free air.  2.  Trace bilateral pleural effusions.

## 2023-09-13 NOTE — CONSULTS
[facility] Consultation    Hermilo Velasquez MRN# [id number]   YOB: 1932 Age: 89 year old   Date of Admission: 9/12/2023  Requesting physician: Dr. Membreno  Reason for consult: NSCLC           Assessment and Plan:     Primary Oncologist: Dr. Dreyer    NSCLC RLL  --A CT of the chest and PET/CT 3/2019 revealed a slowly enlarging right lower lobe ground glass nodule.   --On 3/26/19 he underwent a limited thoracotomy and wedge resection with pathology revealing a 2.5 cm adenocarcinoma. Final stage [id number] or stage 1.  - CT CAP 3/13/23 EDI   - CT CAP 9/11/23 shows multiple new pulmonary nodules are present. One of these nodules measuring 1.8 cm is located adjacent to surgical sutures in the right lower lobe and concerning for recurrent malignancy. Multiple other new pulmonary nodules are seen bilaterally that  may reflect worsening metastatic disease. Differential diagnosis includes atypical infection  - Discussed with Dr. Dreyer. Plan outpatient PET in about 2 weeks, then F/U with Dr. Dreyer to discuss if pt wishes to proceed with bx.     Lymphoplasmacytic lymphoma / Waldenstrom's   --stage IV, CD20-positive lymphoplasmacytic lymphoma.  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  -- Neuropathy currently mild to moderate, unchanged, present only in the feet   - IgM levels in 3/2023 normal. Is due for repeat testing. Will order.     Hx of Anemia  --related to Lymphoma, iron deficiency 2/2 GI bleeding,  chronic disease  --Iron saturation 27% and ferritin 107 in June 2022  -- required IV iron in 4/29/22   -- Hg 11.5 on June. Pt taking oral iron.   - drop in hgb to 10.1. Will repeat iron studies.     Recurrent C difficile colitis  -Tested positive for C.  difficile colitis initially in July 2023 with improvement of diarrhea after initial 10 days course of oral vancomycin.    -Recurrent diarrhea in August and admitted to the hospital from 8/16 through 8/19.  He was seen by ID who recommended prolonged vancomycin taper-  -Apparently his diarrhea got better and he stopped taking vancomycin  - now with recurrent diarrhea  CT shows proctitis and pancolitis  - on oral vanco, IV flagyl    Unwitnessed fall  - head CT negative for acute pathology    Santo Pizano, ILYA, AOCNP  Nurse Practitioner  [facility]  [phone]             Chief Complaint:   Hypotension         Oncologic history:   Mr. Velasquez was initially diagnosed in May 2010 when he presented with peripheral neuropathy and an IgM monoclonal gammopathy. A bone marrow biopsy confirmed lymphoplasmacytic lymphoma. The patient was started on treatment with single-agent Rituxan given his peripheral neuropathy, and completed two years of treatment with rituximab given weekly for four doses every six months in June 2012.     Since then, the neuropathy has remained quite minor and stable; and the patient has done quite well without progression.     In November 2015 he was hospitalized while anticoagulated with GI bleeding due to an AVM.     In February 2016 he was hospitalized again with bilateral pulmonary emboli and DVT, pneumonia, and CHF. An IVC filter was placed but he has since done well back on anticoagulation with Coumadin.     On follow up imaging has revealed an enlarging lung nodule.     A CBC and chemistries 3/1/19 were normal.     On 3/4/19 a CT revealed an enlarging right lower lobe ground glass opacity.     A PET/CT 3/12/19 confirmed the lung nodule without other concern.     On 3/26/19 he underwent a limited thoracotomy and wedge resection with pathology revealing a 2.5 cm adenocarcinoma. Final stage [id number] or stage 1.     In 5/2019 he was hospitalized due to sepsis from right hand  "cellulitis.     A chest CT  7/3/19 revealed a small right pleural effusion without malignancy.     A chest CT  1/15/20 remained stable.    Chest CT 20 with unchanged emphysema and no evidence of malignancy    The patient was admitted 8/15/2020 to 2020 with acute blood loss anemia due to a slow GI bleed. Prior to admission Eliquis was held, endoscopy revealed a single nonbleeding angiectasia in the duodenum without active bleeding, and then Eliquis was resumed. A lower extremity ultrasound was negative for DVT.  On 2020 a CT scan of the abdomen pelvis was without acute pathology.    CT chest 20 with new clustered right upper lobe pulmonary nodules that are most likely inflammatory.    Hospitalized 20 - 20 with COPD exacerbation and possible pneumonia, upper GI bleeding, FRED due to dehydration    CT chest 20 with resolved right lung nodules and no evidence for malignancy    CT chest 2021 stable without recurrent or metastatic malignancy  CT CAP  2023 EDI         Physical Exam:   Vitals were reviewed  Blood pressure 124/62, pulse 85, temperature 97.6  F (36.4  C), temperature source Oral, resp. rate 20, height 1.778 m (5' 10\"), weight 69.3 kg (152 lb 12.5 oz), SpO2 94 %.  Temperatures:  Current - Temp: 97.6  F (36.4  C); Max - Temp  Av.7  F (36.5  C)  Min: 97.5  F (36.4  C)  Max: 98.1  F (36.7  C)  Respiration range: Resp  Av.6  Min: 12  Max: 28  Pulse range: Pulse  Av  Min: 85  Max: 141  Blood pressure range: Systolic (24hrs), Av , Min:86 , Max:124   ; Diastolic (24hrs), Av, Min:42, Max:74    Pulse oximetry range: SpO2  Av.6 %  Min: 90 %  Max: 99 %  No intake or output data in the 24 hours ending 23 1051    GENERAL: No acute distress. [place]  SKIN: No rashes or jaundice.  HEENT: Normocephalic, atraumatic. Eyes anicteric. Oropharynx is clear.  LYMPH: No palpable lymphadenopathy in the cervical, supraclavicular, axillary, or inguinal " regions.  LUNGS: Breathing easily with O2  ABDOMEN: Soft, nontender, nondistended with no palpable hepatosplenomegaly.  EXTREMITIES: No clubbing, cyanosis, or edema.  MENTAL: Alert and oriented to person, place, and time.                Past Medical History:   I have reviewed this patient's past medical history  Past Medical History:   Diagnosis Date    Adenocarcinoma, lung, 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 Vasques     Aortic stenosis     Severe AS, 9/2015 AVR - [facility] TRIFECTA Bovine bioprosthesis 25MM [id number]    Atrial fibrillation (H)     9/2015 Paroxysmal post op Afib - discharged on Warfarin and a beta blocker    COPD (chronic obstructive pulmonary disease) (H)     Deep vein thrombosis (H)     GERD (gastroesophageal reflux disease)     Heart murmur     Monoclonal gammopathy     plasmacyte prominent causing monoclonal gammopathy    Need for SBE (subacute bacterial endocarditis) prophylaxis     Neuropathy     Other and unspecified hyperlipidemia     Other malignant lymphomas     non hodgkin's lymphoma    RBBB (right bundle branch block)     Severe sepsis with acute organ dysfunction (H) 11/16/2015    Unspecified hereditary and idiopathic peripheral neuropathy              Past Surgical History:   I have reviewed this patient's past surgical history  Past Surgical History:   Procedure Laterality Date    APPENDECTOMY      ARTHROPLASTY KNEE Right 7/25/2022    Procedure: RIGHT TOTAL KNEE ARTHROPLASTY;  Surgeon: Giuliano Deshpande MD;  Location:  OR    AS TOTAL KNEE ARTHROPLASTY      BACK SURGERY      ESOPHAGOSCOPY, GASTROSCOPY, DUODENOSCOPY (EGD), COMBINED N/A 11/28/2015    Procedure: COMBINED ESOPHAGOSCOPY, GASTROSCOPY, DUODENOSCOPY (EGD);  Surgeon: Danis Castillo MD;  Location:  GI    ESOPHAGOSCOPY, GASTROSCOPY, DUODENOSCOPY (EGD), COMBINED N/A 7/26/2018    Procedure: COMBINED ESOPHAGOSCOPY, GASTROSCOPY, DUODENOSCOPY (EGD);;  Surgeon: Toby Dong DO;  Location:  GI     ESOPHAGOSCOPY, GASTROSCOPY, DUODENOSCOPY (EGD), COMBINED N/A 2020    Procedure: ESOPHAGOGASTRODUODENOSCOPY (EGD);  Surgeon: Herrera Henry MD;  Location:  GI    ESOPHAGOSCOPY, GASTROSCOPY, DUODENOSCOPY (EGD), COMBINED N/A 10/24/2020    Procedure: ESOPHAGOGASTRODUODENOSCOPY (EGD);  Surgeon: Vick Florentino MD;  Location:  GI    ESOPHAGOSCOPY, GASTROSCOPY, DUODENOSCOPY (EGD), COMBINED N/A 2022    Procedure: ESOPHAGOGASTRODUODENOSCOPY (EGD);  Surgeon: Vick Florentino MD;  Location:  GI    ESOPHAGOSCOPY, GASTROSCOPY, DUODENOSCOPY (EGD), COMBINED N/A 2022    Procedure: ESOPHAGOGASTRODUODENOSCOPY (EGD);  Surgeon: El Mcgovern MD;  Location:  GI    HERNIA REPAIR  2006    HERNIORRHAPHY VENTRAL  2013    Procedure: HERNIORRHAPHY VENTRAL;  VENTRAL HERNIA REPAIR WITH MESH;  Surgeon: Patel Guzman MD;  Location:  OR    KNEE SURGERY      arthroscopic right knee surgery     LOBECTOMY LUNG Right 3/26/2019    POSSIBLE LOBECTOMY LUNG per Dr. Vasques at [facility] of [facility]    REPAIR LIGAMENT ANKLE  2012    Procedure:REPAIR LIGAMENT ANKLE; LEFT TARSAL TUNNEL RELEASE OF KNOT OF AREIL RELEASE; Surgeon:SAUL PUENTE; Location: OR    REPLACE VALVE AORTIC N/A 9/3/2015    Procedure: REPLACE VALVE AORTIC;  Surgeon: Antonino Mitchell MD;  Location:  OR    ROTATOR CUFF REPAIR RT/LT      bilateral    SPINE SURGERY      3 spine surgeries    THORACOTOMY, WEDGE RESECTION LUNG, COMBINED Right 3/26/2019    RIGHT THORACOTOMY, WEDGE RESECTION, RIGHT LOWER LOBE LUNG NODULE,;  Surgeon: Jose A Vasques MD;  Location:  OR    TONSILLECTOMY      TURP                 Social History:   I have reviewed this patient's social history  Social History     Tobacco Use    Smoking status: Former     Packs/day: 2.00     Years: 55.00     Pack years: 110.00     Types: Cigarettes     Quit date: 1998     Years since quittin.6    Smokeless tobacco: Never   Substance Use Topics     "Alcohol use: Not Currently             Family History:   I have reviewed this patient's family history  Family History   Problem Relation Age of Onset    C.A.D. Father     Emphysema Father     Melanoma No family hx of     Skin Cancer No family hx of              Allergies:     Allergies   Allergen Reactions    Omeprazole Itching    Pantoprazole Itching    Prevacid [Lansoprazole] Itching    Lasix [Furosemide] Rash    Lidocaine Blisters and Rash     Allergy to lidocaine ointment  Allergy to lidocaine ointment      Penicillin G Rash    Penicillins Rash     \"broke out from injection\" 60 yrs ago  Tolerates cephalosporins             Medications:   I have reviewed this patient's current medications  Medications Prior to Admission   Medication Sig Dispense Refill Last Dose    acetaminophen (TYLENOL) 500 MG tablet Take 1,000 mg by mouth 3 times daily as needed   prn at prn    albuterol (PROAIR HFA/PROVENTIL HFA/VENTOLIN HFA) 108 (90 Base) MCG/ACT inhaler Inhale 2 puffs into the lungs every 6 hours as needed   prn at prn    albuterol (PROVENTIL) (2.5 MG/3ML) 0.083% neb solution Take 1 vial (2.5 mg) by nebulization every 6 hours as needed for shortness of breath / dyspnea or wheezing 180 mL 11 prn at prn    atorvastatin (LIPITOR) 10 MG tablet Take 1 tablet (10 mg) by mouth daily 90 tablet 0 Past Week    DULoxetine (CYMBALTA) 60 MG capsule Take 60 mg by mouth daily   Past Week    famotidine (PEPCID) 40 MG tablet Take 1 tablet (40 mg) by mouth 2 times daily (Patient taking differently: Take 40 mg by mouth daily) 60 tablet 3 Past Week    ferrous sulfate (FE TABS) 325 (65 Fe) MG EC tablet Take 1 tablet (325 mg) by mouth 2 times daily   Past Week    gabapentin (NEURONTIN) 300 MG capsule Take 3 capsules (900 mg) by mouth At Bedtime For neuropathy pain (Patient taking differently: Take 600 mg by mouth At Bedtime For neuropathy pain) 270 capsule 3 Past Week    tiotropium (SPIRIVA) 18 MCG inhaled capsule Inhale 18 mcg into the lungs " daily   Past Week    vancomycin (VANCOCIN) 125 MG capsule vancomycin prolonged taper:  250 4 times daily for 7 days then 125 mg 4 times daily for 2 weeks then 125 twice daily for 2 weeks then 125 daily for 4 weeks then 125 every other day for 4 weeks then stop and watch. Be  ready to restart immediately if diarrhea relapses. 220 capsule 0 Past Week    fluticasone-vilanterol (BREO ELLIPTA) 200-25 MCG/ACT inhaler Inhale 1 puff into the lungs daily   not taking at not taking    tamsulosin (FLOMAX) 0.4 MG capsule Take 0.4 mg by mouth daily   unknown     Current Facility-Administered Medications Ordered in Epic   Medication Dose Route Frequency Last Rate Last Admin    acetaminophen (TYLENOL) tablet 1,000 mg  1,000 mg Oral TID PRN        albuterol (PROVENTIL HFA/VENTOLIN HFA) inhaler  2 puff Inhalation Q6H PRN        albuterol (PROVENTIL) neb solution 2.5 mg  2.5 mg Nebulization Q6H PRN        atorvastatin (LIPITOR) tablet 10 mg  10 mg Oral Daily   10 mg at 09/13/23 0821    DULoxetine (CYMBALTA) DR capsule 60 mg  60 mg Oral Daily   60 mg at 09/13/23 0820    [START ON 9/14/2023] famotidine (PEPCID) tablet 40 mg  40 mg Oral Daily        ferrous sulfate (FEROSUL) tablet 325 mg  325 mg Oral BID   325 mg at 09/13/23 0820    fluticasone-vilanterol (BREO ELLIPTA) 200-25 MCG/ACT inhaler 1 puff  1 puff Inhalation Daily   1 puff at 09/13/23 0826    lidocaine (LMX4) cream   Topical Q1H PRN        lidocaine 1 % 0.1-1 mL  0.1-1 mL Other Q1H PRN        melatonin tablet 1 mg  1 mg Oral At Bedtime PRN        metroNIDAZOLE (FLAGYL) infusion 500 mg  500 mg Intravenous Q8H   500 mg at 09/13/23 1050    ondansetron (ZOFRAN ODT) ODT tab 4 mg  4 mg Oral Q6H PRN        Or    ondansetron (ZOFRAN) injection 4 mg  4 mg Intravenous Q6H PRN        sodium chloride (PF) 0.9% PF flush 3 mL  3 mL Intracatheter q1 min prn        sodium chloride (PF) 0.9% PF flush 3 mL  3 mL Intracatheter Q8H        sodium chloride 0.9% infusion   Intravenous Continuous  100 mL/hr at 09/13/23 0214 New Bag at 09/13/23 0214    tamsulosin (FLOMAX) capsule 0.4 mg  0.4 mg Oral Daily   0.4 mg at 09/13/23 0820    umeclidinium (INCRUSE ELLIPTA) 62.5 MCG/ACT inhaler 1 puff  1 puff Inhalation Daily   1 puff at 09/13/23 0826    vancomycin (VANCOCIN) capsule 125 mg  125 mg Oral 4x Daily   125 mg at 09/13/23 0820     No current Epic-ordered outpatient medications on file.             Review of Systems:     The 10 point Review of Systems is negative other than noted in the HPI.            Data:   Data   Results for orders placed or performed during the hospital encounter of 09/12/23 (from the past 24 hour(s))   ABO/Rh type and screen    Narrative    The following orders were created for panel order ABO/Rh type and screen.  Procedure                               Abnormality         Status                     ---------                               -----------         ------                     Adult Type and Screen[[id number]]                            Final result                 Please view results for these tests on the individual orders.   INR   Result Value Ref Range    INR 1.15 0.85 - 1.15   CBC with Platelets & Differential    Narrative    The following orders were created for panel order CBC with Platelets & Differential.  Procedure                               Abnormality         Status                     ---------                               -----------         ------                     CBC with platelets and d...[[id number]]  Abnormal            Final result                 Please view results for these tests on the individual orders.   Comprehensive metabolic panel   Result Value Ref Range    Sodium 138 136 - 145 mmol/L    Potassium 4.8 3.4 - 5.3 mmol/L    Chloride 105 98 - 107 mmol/L    Carbon Dioxide (CO2) 21 (L) 22 - 29 mmol/L    Anion Gap 12 7 - 15 mmol/L    Urea Nitrogen 23.1 (H) 8.0 - 23.0 mg/dL    Creatinine 1.59 (H) 0.67 - 1.17 mg/dL    Calcium 8.8 8.2 - 9.6 mg/dL     Glucose 169 (H) 70 - 99 mg/dL    Alkaline Phosphatase 122 40 - 129 U/L    AST 13 0 - 45 U/L    ALT 16 0 - 70 U/L    Protein Total 6.4 6.4 - 8.3 g/dL    Albumin 3.3 (L) 3.5 - 5.2 g/dL    Bilirubin Total 0.6 <=1.2 mg/dL    GFR Estimate 41 (L) >60 mL/min/1.73m2   [place] Draw    Narrative    The following orders were created for panel order [place] Draw.  Procedure                               Abnormality         Status                     ---------                               -----------         ------                     Extra Blue Top Tube[[id number]]                                                         Extra Red Top Tube[[id number]]                               Final result                 Please view results for these tests on the individual orders.   Adult Type and Screen   Result Value Ref Range    ABO/RH(D) A POS     Antibody Screen Negative Negative    SPECIMEN EXPIRATION DATE [id number]    CBC with platelets and differential   Result Value Ref Range    WBC Count 25.5 (H) 4.0 - 11.0 10e3/uL    RBC Count 4.37 (L) 4.40 - 5.90 10e6/uL    Hemoglobin 11.5 (L) 13.3 - 17.7 g/dL    Hematocrit 38.1 (L) 40.0 - 53.0 %    MCV 87 78 - 100 fL    MCH 26.3 (L) 26.5 - 33.0 pg    MCHC 30.2 (L) 31.5 - 36.5 g/dL    RDW 14.1 10.0 - 15.0 %    Platelet Count 188 150 - 450 10e3/uL    % Neutrophils 91 %    % Lymphocytes 2 %    % Monocytes 7 %    % Eosinophils 0 %    % Basophils 0 %    % Immature Granulocytes 0 %    NRBCs per 100 WBC 0 <1 /100    Absolute Neutrophils 23.0 (H) 1.6 - 8.3 10e3/uL    Absolute Lymphocytes 0.6 (L) 0.8 - 5.3 10e3/uL    Absolute Monocytes 1.7 (H) 0.0 - 1.3 10e3/uL    Absolute Eosinophils 0.0 0.0 - 0.7 10e3/uL    Absolute Basophils 0.0 0.0 - 0.2 10e3/uL    Absolute Immature Granulocytes 0.1 <=0.4 10e3/uL    Absolute NRBCs 0.0 10e3/uL   Extra Red Top Tube   Result Value Ref Range    Hold Specimen JIC    Lipase   Result Value Ref Range    Lipase 12 (L) 13 - 60 U/L   Troponin T, High Sensitivity   Result  Value Ref Range    Troponin T, High Sensitivity 97 (H) <=22 ng/L   CK total   Result Value Ref Range    CK 51 39 - 308 U/L   CBC with platelets differential *Canceled*    Narrative    The following orders were created for panel order CBC with platelets differential.  Procedure                               Abnormality         Status                     ---------                               -----------         ------                       Please view results for these tests on the individual orders.   Blood Culture Peripheral Blood    Specimen: Peripheral Blood   Result Value Ref Range    Culture No growth after 12 hours    Blood Culture Peripheral Blood    Specimen: Peripheral Blood   Result Value Ref Range    Culture No growth after 12 hours    iStat Gases (lactate) venous, POCT   Result Value Ref Range    Lactic Acid POCT 3.5 (H) <=2.0 mmol/L    Bicarbonate Venous POCT 22 21 - 28 mmol/L    O2 Sat, Venous POCT 20 (L) 94 - 100 %    pCO2 Venous POCT 48 40 - 50 mm Hg    pH Venous POCT 7.27 (L) 7.32 - 7.43    pO2 Venous POCT 18 (L) 25 - 47 mm Hg   XR Chest Port 1 View    Narrative    EXAM: XR CHEST PORT 1 VIEW  LOCATION: [facility]  DATE: 9/12/2023    INDICATION: Hypoxia.  COMPARISON: 08/16/2023.      Impression    IMPRESSION: Pulmonary emphysema and chronic interstitial changes, with reticulonodular appearance in the right lung base. Superimposed mild interstitial edema is difficult to exclude.     Suture material also overlies the right lung base. Small right pleural effusion. No pneumothorax.    Mild cardiomegaly. Atherosclerosis of the thoracic aorta. Postsurgical changes of median sternotomy. Aortic valve prosthesis.    Suture anchor at the left proximal humerus.   Head CT w/o contrast    Narrative    EXAM: CT HEAD W/O CONTRAST  LOCATION: [facility]  DATE: 9/12/2023    INDICATION: Found on bathroom floor likely syncope iso C diff and significant volume  loss.  COMPARISON: Brain MRI 06/28/2023.  TECHNIQUE: Routine CT Head without IV contrast. Multiplanar reformats. Dose reduction techniques were used.    FINDINGS:  INTRACRANIAL CONTENTS: No intracranial hemorrhage, extraaxial collection, or mass effect.  No CT evidence of acute infarct. Mild presumed chronic small vessel ischemic changes. Moderate generalized volume loss. No hydrocephalus.     VISUALIZED ORBITS/SINUSES/MASTOIDS: No intraorbital abnormality. No paranasal sinus mucosal disease. No middle ear or mastoid effusion.    BONES/SOFT TISSUES: No acute abnormality.      Impression    IMPRESSION:  1.  No CT evidence for acute intracranial process.  2.  Brain atrophy and presumed chronic microvascular ischemic changes as above.   EKG 12 lead   Result Value Ref Range    Systolic Blood Pressure  mmHg    Diastolic Blood Pressure  mmHg    Ventricular Rate 128 BPM    Atrial Rate  BPM    KY Interval  ms    QRS Duration 134 ms     ms    QTc 511 ms    P Axis  degrees    R AXIS -25 degrees    T Axis 63 degrees    Interpretation ECG       Wide QRS tachycardia  Right bundle branch block  Abnormal ECG  When compared with ECG of 17-AUG-2023 00:26,  Wide QRS tachycardia has replaced Atrial flutter  Confirmed by GENERATED REPORT, COMPUTER ([id number]),  Carol Caballero ([id number]) on 9/12/2023 6:51:16 PM     iStat Gases (lactate) venous, POCT   Result Value Ref Range    Lactic Acid POCT 1.9 <=2.0 mmol/L    Bicarbonate Venous POCT 21 21 - 28 mmol/L    O2 Sat, Venous POCT 51 (L) 94 - 100 %    pCO2 Venous POCT 50 40 - 50 mm Hg    pH Venous POCT 7.23 (L) 7.32 - 7.43    pO2 Venous POCT 32 25 - 47 mm Hg   Troponin T, High Sensitivity   Result Value Ref Range    Troponin T, High Sensitivity 83 (H) <=22 ng/L   Abd/pelvis CT,  IV  contrast only TRAUMA / AAA    Narrative    EXAM: CT ABDOMEN PELVIS W CONTRAST  LOCATION: [facility]  DATE: 9/12/2023    INDICATION: Found down syncope, hx of c diff  infection  COMPARISON: CT 8/17/2023 and 10/9/2022  TECHNIQUE: CT scan of the abdomen and pelvis was performed following injection of IV contrast. Multiplanar reformats were obtained. Dose reduction techniques were used.  CONTRAST: 75 mL Isovue 370    FINDINGS:   LOWER CHEST: Sternotomy with aortic valve replacement. Trace bilateral pleural effusions with adjacent atelectasis, right greater than left. Bibasilar emphysema and fibrotic changes. Stable 7 mm left lower lobe nodule. Recommend attention on follow-up.   The other previously noted nodule is not well seen on this exam.    HEPATOBILIARY: Stable small hypodense hepatic lesions, likely cysts. Normal gallbladder and bile ducts.    PANCREAS: Stable atrophy.    SPLEEN: Normal.    ADRENAL GLANDS: Normal.    KIDNEYS/BLADDER: Stable atrophy and symmetric bilateral perinephric stranding which is likely chronic. No hydronephrosis. Moderately distended bladder.    BOWEL: Small hiatal hernia. Ventral hernia repair. Normal caliber small bowel without focal mural thickening. Appendectomy. Pancolitis with moderate diffuse colonic wall thickening and mild surrounding edema. Similar inflammatory mural thickening and   edema involving the rectum. Mild mesenteric and body wall edema. No free air or free fluid.    LYMPH NODES: Normal.    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 Moderate to severe aortoiliac atherosclerosis. Patent central SMA and SMV.    PELVIC ORGANS: Normal.    MUSCULOSKELETAL: Spinal and pelvic degenerative changes. Osseous demineralization. Lower lumbar laminectomy. Stable L4 vertebral body compression fracture.      Impression    IMPRESSION:   1.  Proctitis and pancolitis which may reflect an infectious or inflammatory process. No abscess or free air.  2.  Trace bilateral pleural effusions.   CBC with platelets   Result Value Ref Range    WBC Count 20.2 (H) 4.0 - 11.0 10e3/uL    RBC Count 3.69 (L) 4.40  - 5.90 10e6/uL    Hemoglobin 10.1 (L) 13.3 - 17.7 g/dL    Hematocrit 31.3 (L) 40.0 - 53.0 %    MCV 85 78 - 100 fL    MCH 27.4 26.5 - 33.0 pg    MCHC 32.3 31.5 - 36.5 g/dL    RDW 14.3 10.0 - 15.0 %    Platelet Count 142 (L) 150 - 450 10e3/uL   Basic metabolic panel   Result Value Ref Range    Sodium 139 136 - 145 mmol/L    Potassium 3.8 3.4 - 5.3 mmol/L    Chloride 108 (H) 98 - 107 mmol/L    Carbon Dioxide (CO2) 21 (L) 22 - 29 mmol/L    Anion Gap 10 7 - 15 mmol/L    Urea Nitrogen 22.8 8.0 - 23.0 mg/dL    Creatinine 1.38 (H) 0.67 - 1.17 mg/dL    Calcium 7.9 (L) 8.2 - 9.6 mg/dL    Glucose 128 (H) 70 - 99 mg/dL    GFR Estimate 49 (L) >60 mL/min/1.73m2   Troponin T, High Sensitivity   Result Value Ref Range    Troponin T, High Sensitivity 80 (H) <=22 ng/L     *Note: Due to a large number of results and/or encounters for the requested time period, some results have not been displayed. A complete set of results can be found in Results Review.

## 2023-09-13 NOTE — PLAN OF CARE
Goal Outcome Evaluation:      Plan of Care Reviewed With: patient    Overall Patient Progress: no changeOverall Patient Progress: no change    Outcome Evaluation: Pt to consume % of meals TID and oral supplements prn

## 2023-09-13 NOTE — PROGRESS NOTES
"   09/13/23 1400   Appointment Info   Signing Clinician's Name / Credentials (OT) Jessica Talbot, OTR/L   Rehab Comments (OT) Initial OT Eval   Living Environment   People in Home spouse   Current Living Arrangements house   Home Accessibility stairs to enter home   Number of Stairs, Main Entrance 2   Stair Railings, Main Entrance railings safe and in good condition   Transportation Anticipated family or friend will provide   Living Environment Comments reports he is able to stay on the main floor. tub shower   Self-Care   Usual Activity Tolerance good   Current Activity Tolerance fair   Regular Exercise No   Equipment Currently Used at Home cane, straight;walker, rolling   Fall history within last six months yes   Number of times patient has fallen within last six months   (\"a few\")   Activity/Exercise/Self-Care Comment reports indp baseline with self care, ADL, sometimes uses a cane or a walker   Instrumental Activities of Daily Living (IADL)   IADL Comments pt reports that he has no concerns with IADL and is able to complete them with his wife in the home   General Information   Onset of Illness/Injury or Date of Surgery 09/12/23   Referring Physician Eli Membreno MD   Patient/Family Therapy Goal Statement (OT) to go home   Additional Occupational Profile Info/Pertinent History of Current Problem Hermilo Velasquez is a 89 year old male with past medical history of recurrent C. difficile colitis, adenocarcinoma of the right lung, COPD with chronic hypoxic respiratory failure using oxygen at home, history of emphysema, DVT/PE, paroxysmal atrial fibrillation, hypertension, CKD stage III, dyslipidemia, BPH, non-Hodgkin lymphoma, gastroesophageal reflux disease who was brought in to ER for evaluation of hypotension and diarrhea.   Existing Precautions/Restrictions fall;oxygen therapy device and L/min   General Observations and Info on 2 L NC, sats stable >90% all session. agreeable. in bed. c diff prec "   Cognitive Status Examination   Orientation Status orientation to person, place and time   Cognitive Status Comments questionable historian. very [place]   Visual Perception   Visual Impairment/Limitations WFL   Sensory   Sensory Quick Adds sensation intact   Pain Assessment   Patient Currently in Pain Yes, see Vital Sign flowsheet   Posture   Posture forward head position;protracted shoulders;kyphosis   Range of Motion Comprehensive   Comment, General Range of Motion WFL For ADL   Strength Comprehensive (MMT)   Comment, General Manual Muscle Testing (MMT) Assessment generalized weakness and deconditioning-- 3+/5, equal bilterally   Muscle Tone Assessment   Muscle Tone Quick Adds No deficits were identified   Coordination   Upper Extremity Coordination No deficits were identified   Bed Mobility   Bed Mobility supine-sit   Supine-Sit [place] (Bed Mobility) minimum assist (75% patient effort)   Comment (Bed Mobility) use of rail   Transfers   Transfers sit-stand transfer   Sit-Stand Transfer   Sit-Stand [place] (Transfers) minimum assist (75% patient effort)   Activities of Daily Living   BADL Assessment/Intervention bathing;upper body dressing;lower body dressing;toileting   Bathing Assessment/Intervention   [place] Level (Bathing) moderate assist (50% patient effort)   Upper Body Dressing Assessment/Training   [place] Level (Upper Body Dressing) moderate assist (50% patient effort)   Lower Body Dressing Assessment/Training   [place] Level (Lower Body Dressing) moderate assist (50% patient effort)   Toileting   [place] Level (Toileting) moderate assist (50% patient effort)   Clinical Impression   Criteria for Skilled Therapeutic Interventions Met (OT) Yes, treatment indicated   OT Diagnosis decreased function in ADL   OT Problem List-Impairments impacting ADL problems related to;activity tolerance impaired;pain;mobility;strength;hearing   Assessment of Occupational Performance 5 or more  Performance Deficits   Identified Performance Deficits bathing, dressing, toileting, transfer, IADL   Planned Therapy Interventions (OT) ADL retraining;IADL retraining;strengthening;progressive activity/exercise   Clinical Decision Making Complexity (OT) low complexity   Anticipated Equipment Needs Upon Discharge (OT) tub bench   Risk & Benefits of therapy have been explained evaluation/treatment results reviewed;care plan/treatment goals reviewed;risks/benefits reviewed;current/potential barriers reviewed;participants voiced agreement with care plan;participants included;patient   Clinical Impression Comments decreased function in ADL warrants skilled therapy   OT Total Evaluation Time   OT Eval, Low Complexity Minutes (65191) 10   OT Goals   Therapy Frequency (OT) daily   OT Predicted Duration/Target Date for Goal Attainment 09/20/23   OT Goals Hygiene/Grooming;Upper Body Dressing;Toilet Transfer/Toileting;OT Goal 1;Lower Body Dressing;Transfers   OT: Hygiene/Grooming supervision/stand-by assist   OT: Upper Body Dressing Supervision/stand-by assist   OT: Lower Body Dressing Supervision/stand-by assist   OT: Transfer Supervision/stand-by assist   OT: Toilet Transfer/Toileting Supervision/stand-by assist   OT: Goal 1 Pt will demo 10 minutes of ADL standing at sinkside   Interventions   Interventions Quick Adds Self-Care/Home Management   Self-Care/Home Management   Self-Care/Home Mgmt/ADL, Compensatory, Meal Prep Minutes (25481) 14   Symptoms Noted During/After Treatment (Meal Preparation/Planning Training) fatigue   Treatment Detail/Skilled Intervention pt seen for OT tx session this date. agreeable [place], attempted cog screen, unable to participate given [place], fixated on bowel movement. completed sit>Stand from EOB with VC for hand placement, amb ot bathroom with FWW, toilet tx and lB dressing with VC/ tC min A to complete on low toilet, good use of grab bar with 1 visual cue. amb to recliner chair and sat for rest,  VC/ TC, alarm on, all needs in reach, reprots he is wanting to wait for his wife to order   OT Discharge Planning   OT Plan cog screen, standing tolerance   OT Discharge Recommendation (DC Rec) Transitional Care Facility   OT Rationale for DC Rec pt below baseline function in self care, ADL. would benefit from TCU. pt with many hospital admissions, fall, illness. likely would benefit significantly from more supportive living such as halfway. may be able to progress to home with home therapy pending progress   OT Brief overview of current status Ax1   Total Session Time   Timed Code Treatment Minutes 14   Total Session Time (sum of timed and untimed services) 24

## 2023-09-13 NOTE — H&P
[facility]    History and Physical - Hospitalist Service       Date of Admission:  9/12/2023    Assessment & Plan      Hermilo Velasquez is a 89 year old male with past medical history of recurrent C. difficile colitis, adenocarcinoma of the right lung, COPD with chronic hypoxic respiratory failure using oxygen at home, history of emphysema, DVT/PE, paroxysmal atrial fibrillation, hypertension, CKD stage III, dyslipidemia, BPH, non-Hodgkin lymphoma, gastroesophageal reflux disease who was brought in to ER for evaluation of hypotension and diarrhea.    # Recurrent C. difficile colitis  # Sepsis, due to above  - he tested positive for C. difficile colitis initially in July 2023 with improvement of diarrhea after initial 10 days course of oral vancomycin.  He had recurrent diarrhea in August and she was admitted to the hospital from 8/16 through 8/19.  He was seen by ID who recommended prolonged vancomycin taper- 250 mg 4 times daily for 7 days then 125 mg 4 times daily for 2 weeks then 125 twice daily for 2 weeks then 125 daily for 4 weeks then 125 every other day for 4 weeks then stop and watch. Be ready to restart immediately if diarrhea relapses.   -Apparently his diarrhea got better and he stopped taking vancomycin  -Started having diarrhea again few days ago; he had an unwitnessed fall earlier today; upon EMS arrival he was hypotensive and tachycardic; he responded well to fluid resuscitation  -In ER blood pressure was initially still on lower side but improved to 106/59, heart rate also improved from 130s to 106.  -Lactic acid initially elevated at 3.5--improved to 1.9; white blood cells elevated 25.5  -CT abdomen- Proctitis and pancolitis which may reflect an infectious or inflammatory process. No abscess or free air.  -Contact isolation  -Stool sample to check for C. Difficile  -ER attending discussed with pharmacy who recommended to resume oral vancomycin and start IV  Flagyl  -Vancomycin 125 mg p.o. 4 times daily and Flagyl 500 mg IV every 8 hours ordered  -ID consult  -Monitor fever curve and white blood cells trend    # Unwitnessed fall  # Hypotension  -He had an unwitnessed fall at home; found to be hypotensive by EMS  -Blood pressure was a still on lower side upon arrival in ER; improved with the IV fluids and remained stable  -Hypotension is likely related to sepsis and dehydration  -CT head -negative for acute pathology  -Monitor on telemetry  -Check orthostasis in the morning  -Fall precaution  -PT/OT evaluation    # History of adenocarcinoma of the right lung  # New lung nodules  -He follows with [facility]  - s/p RLL wedge resection by Dr Vasques   -CT chest on 9/11 (outpatient ) showed- Multiple new pulmonary nodules are present. One of these nodules measuring 1.8 cm is located adjacent to surgical sutures in the right lower lobe and concerning for recurrent malignancy. Multiple other new pulmonary nodules are seen bilaterally that  may reflect worsening metastatic disease. Differential diagnosis includes atypical infection. Recommend close attention on follow-up exam; Enlarging small right pleural effusion.  -Hem on consult    # COPD  # Chronic hypoxic respiratory failure on oxygen 2 L  -Respiratory status seems at baseline  -Resume prior to admission inhalers    # History of severe aortic stenosis, s/p bioprosthetic aortic valve replacement  # Dyslipidemia  -Resume PTA statin    # Elevated troponin, likely demand ischemia  -Noted to have mildly elevated troponins of 97 on admission but trending down 83--80  -He denies chest pain  -This likely represents demand ischemia in the setting of severe sepsis and hypotension  -Monitor on telemetry  - Echocardiogram    # FRED on CKD stage 3  -Baseline creatinine around 1.2-1.4  -Creatinine on admission 1.59  -Started on normal saline at 100 cc/h  -Avoid nephrotoxic drugs  -BMP in a.m.    # GERD  -Resume prior to  admission Pepcid    # BPH  -Prior to admission Flomax.       Diet: Combination Diet Regular Diet Adult    DVT Prophylaxis: Pneumatic Compression Devices  Suazo Catheter: Not present  Lines: None     Cardiac Monitoring: ACTIVE order. Indication: Tachyarrhythmias, acute (48 hours)  Code Status: Full Code      Clinically Significant Risk Factors Present on Admission              # Hypoalbuminemia: Lowest albumin = 3.3 g/dL at 9/12/2023  4:28 PM, will monitor as appropriate     # Hypertension: Noted on problem list  # Chronic heart failure with preserved ejection fraction: heart failure noted on problem list and last echo with EF >50%         # COPD: noted on problem list        Disposition Plan      Expected Discharge Date: 09/14/2023                  Eli Membreno MD  Hospitalist Service  [facility]  Securely message with HeadCount (more info)  Text page via [facility] Paging/Directory     ______________________________________________________________________    Chief Complaint   Hypotension and diarrhea    History is obtained partially from the patient, although he is a poor historian.  I did discuss with ER attending, Dr. Egan.  I reviewed his electronic medical records.    History of Present Illness   Hermilo Velasquez is a 89 year old male with past medical history of recurrent C. difficile colitis, adenocarcinoma of the right lung, COPD with chronic hypoxic respiratory failure using oxygen at home, history of emphysema, DVT/PE, paroxysmal atrial fibrillation, hypertension, CKD stage III, dyslipidemia, BPH, non-Hodgkin lymphoma, gastroesophageal reflux disease who was brought in to ER for evaluation of hypotension and diarrhea.  As per the chart review it seems that he tested positive for C. difficile colitis initially in July 2023 with improvement of diarrhea after initial 10 days course of oral vancomycin.  He had recurrent diarrhea in August and she was admitted to the hospital from 8/16  through 8/19.  He was seen by ID who recommended prolonged vancomycin taper- 250 4 times daily for 7 days then 125 mg 4 times daily for 2 weeks then 125 twice daily for 2 weeks then 125 daily for 4 weeks then 125 every other day for 4 weeks then stop and watch. Be ready to restart immediately if diarrhea relapses.     It seems that that his diarrhea got better and he stopped taking vancomycin despite the recommended long taper.  He apparently started having diarrhea again.  Apparently earlier today he had an unwitnessed fall.  As per report, his wife found him face down at the bottom of the stairwell.  It was not clear if he lost his consciousness.  He did report feeling lightheaded.  EMS was called.  EMS found him covered in stool. As per report by EMS he was hypotensive with a blood pressure of 59/29.  He was also tachycardic heart rate in 130s to 160s.  He was administered bolus normal saline 500 cc with improvement of his blood pressure.      Upon further questioning the patient denies any fever, he denies chest pain, no headache.  Reports feeling short of breath at times denies abdominal pain.  He denies dysuria.  In ER he was seen by Dr. Egan.  He was hypotensive and tachycardic initially.  Blood pressure was 86/54, heart rate 135.  After initial fluid resuscitation in ER his blood pressure improved to 107/62 and heart rate improved to 108..  His temperature in ER was 98.1 respiratory rate 20 and oxygen saturation 95% on 3 L via nasal cannula.    Blood work in ER showed a CBC with significantly elevated white blood cells of 25.5, hemoglobin 11.5, hematocrit 38.1, platelet count 188.  BMP with creatinine 1.59, BUN 23.1, bicarb 21.  Sodium 138, potassium 4.8.  LFTs within normal limits.  Glucose 169, initial lactic acidosis 3.5 but improved to 1.9.  Lipase 12, troponin 97, repeated troponin 83.  Chest x-ray- Pulmonary emphysema and chronic interstitial changes, with reticulonodular appearance in the right lung  base. Superimposed mild interstitial edema is difficult to exclude.   CT head negative for any acute pathology  CT abdomen and pelvis- Proctitis and pancolitis which may reflect an infectious or inflammatory process. No abscess or free air.;  It also showed trace bilateral pleural effusion.    He had multiple bowel movements in ER; ER attending discussed with pharmacy who recommended to start him on p.o. vancomycin and IV Flagyl.          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 Vasques     Aortic stenosis     Severe AS, 9/2015 AVR - [facility] TRIFECTA Bovine bioprosthesis 25MM TF-25A    Atrial fibrillation (H)     9/2015 Paroxysmal post op Afib - discharged on Warfarin and a beta blocker    COPD (chronic obstructive pulmonary disease) (H)     Deep vein thrombosis (H)     GERD (gastroesophageal reflux disease)     Heart murmur     Monoclonal gammopathy     plasmacyte prominent causing monoclonal gammopathy    Need for SBE (subacute bacterial endocarditis) prophylaxis     Neuropathy     Other and unspecified hyperlipidemia     Other malignant lymphomas     non hodgkin's lymphoma    RBBB (right bundle branch block)     Severe sepsis with acute organ dysfunction (H) 11/16/2015    Unspecified hereditary and idiopathic peripheral neuropathy        Past Surgical History   Past Surgical History:   Procedure Laterality Date    APPENDECTOMY      ARTHROPLASTY KNEE Right 7/25/2022    Procedure: RIGHT TOTAL KNEE ARTHROPLASTY;  Surgeon: Giuliano Deshpande MD;  Location:  OR    AS TOTAL KNEE ARTHROPLASTY      BACK SURGERY      ESOPHAGOSCOPY, GASTROSCOPY, DUODENOSCOPY (EGD), COMBINED N/A 11/28/2015    Procedure: COMBINED ESOPHAGOSCOPY, GASTROSCOPY, DUODENOSCOPY (EGD);  Surgeon: Danis Castillo MD;  Location:  GI    ESOPHAGOSCOPY, GASTROSCOPY, DUODENOSCOPY (EGD), COMBINED N/A 7/26/2018    Procedure: COMBINED ESOPHAGOSCOPY,  GASTROSCOPY, DUODENOSCOPY (EGD);;  Surgeon: Toby Dong DO;  Location:  GI    ESOPHAGOSCOPY, GASTROSCOPY, DUODENOSCOPY (EGD), COMBINED N/A 8/17/2020    Procedure: ESOPHAGOGASTRODUODENOSCOPY (EGD);  Surgeon: Herrera Henry MD;  Location:  GI    ESOPHAGOSCOPY, GASTROSCOPY, DUODENOSCOPY (EGD), COMBINED N/A 10/24/2020    Procedure: ESOPHAGOGASTRODUODENOSCOPY (EGD);  Surgeon: Vick Florentino MD;  Location:  GI    ESOPHAGOSCOPY, GASTROSCOPY, DUODENOSCOPY (EGD), COMBINED N/A 4/11/2022    Procedure: ESOPHAGOGASTRODUODENOSCOPY (EGD);  Surgeon: Vick Florentino MD;  Location:  GI    ESOPHAGOSCOPY, GASTROSCOPY, DUODENOSCOPY (EGD), COMBINED N/A 8/26/2022    Procedure: ESOPHAGOGASTRODUODENOSCOPY (EGD);  Surgeon: El Mcgovern MD;  Location:  GI    HERNIA REPAIR  2006    HERNIORRHAPHY VENTRAL  4/17/2013    Procedure: HERNIORRHAPHY VENTRAL;  VENTRAL HERNIA REPAIR WITH MESH;  Surgeon: Patel Guzman MD;  Location:  OR    KNEE SURGERY      arthroscopic right knee surgery     LOBECTOMY LUNG Right 3/26/2019    POSSIBLE LOBECTOMY LUNG per Dr. [facility] at [facility]    REPAIR LIGAMENT ANKLE  2/23/2012    Procedure:REPAIR LIGAMENT ANKLE; LEFT TARSAL TUNNEL RELEASE OF KNOT OF ARIEL RELEASE; Surgeon:SAUL PUENTE; Location: OR    REPLACE VALVE AORTIC N/A 9/3/2015    Procedure: REPLACE VALVE AORTIC;  Surgeon: Antonino Mitchell MD;  Location:  OR    ROTATOR CUFF REPAIR RT/LT      bilateral    SPINE SURGERY      3 spine surgeries    THORACOTOMY, WEDGE RESECTION LUNG, COMBINED Right 3/26/2019    RIGHT THORACOTOMY, WEDGE RESECTION, RIGHT LOWER LOBE LUNG NODULE,;  Surgeon: Jose A Vasques MD;  Location:  OR    TONSILLECTOMY      TURP         Prior to Admission Medications   Prior to Admission Medications   Prescriptions Last Dose Informant Patient Reported? Taking?   DULoxetine (CYMBALTA) 60 MG capsule   Yes No   Sig: Take 60 mg by mouth daily   acetaminophen (TYLENOL)  500 MG tablet  Spouse/Significant Other Yes No   Sig: Take 1,000 mg by mouth 3 times daily as needed   albuterol (PROAIR HFA/PROVENTIL HFA/VENTOLIN HFA) 108 (90 Base) MCG/ACT inhaler  Spouse/Significant Other Yes No   Sig: Inhale 2 puffs into the lungs every 6 hours as needed   albuterol (PROVENTIL) (2.5 MG/3ML) 0.083% neb solution  Spouse/Significant Other No No   Sig: Take 1 vial (2.5 mg) by nebulization every 6 hours as needed for shortness of breath / dyspnea or wheezing   atorvastatin (LIPITOR) 10 MG tablet  Spouse/Significant Other No No   Sig: Take 1 tablet (10 mg) by mouth daily   famotidine (PEPCID) 40 MG tablet  Spouse/Significant Other No No   Sig: Take 1 tablet (40 mg) by mouth 2 times daily   Patient taking differently: Take 40 mg by mouth daily   ferrous sulfate (FE TABS) 325 (65 Fe) MG EC tablet  Spouse/Significant Other Yes No   Sig: Take 1 tablet (325 mg) by mouth 2 times daily   fluticasone-vilanterol (BREO ELLIPTA) 200-25 MCG/ACT inhaler  Self Yes No   Sig: Inhale 1 puff into the lungs daily   gabapentin (NEURONTIN) 300 MG capsule  Self No No   Sig: Take 3 capsules (900 mg) by mouth At Bedtime For neuropathy pain   Patient taking differently: Take 600 mg by mouth At Bedtime For neuropathy pain   tamsulosin (FLOMAX) 0.4 MG capsule  Spouse/Significant Other Yes No   Sig: Take 0.4 mg by mouth daily   tiotropium (SPIRIVA) 18 MCG inhaled capsule  Spouse/Significant Other Yes No   Sig: Inhale 18 mcg into the lungs daily   vancomycin (VANCOCIN) 125 MG capsule   No No   Sig: vancomycin prolonged taper:  250 4 times daily for 7 days then 125 mg 4 times daily for 2 weeks then 125 twice daily for 2 weeks then 125 daily for 4 weeks then 125 every other day for 4 weeks then stop and watch. Be  ready to restart immediately if diarrhea relapses.      Facility-Administered Medications: None        Review of Systems    The 10 point Review of Systems is negative other than noted in the HPI or here.     Social  "History   I have reviewed this patient's social history and updated it with pertinent information if needed.  Social History     Tobacco Use    Smoking status: Former     Packs/day: 2.00     Years: 55.00     Pack years: 110.00     Types: Cigarettes     Quit date: 1998     Years since quittin.6    Smokeless tobacco: Never   Vaping Use    Vaping Use: Never used   Substance Use Topics    Alcohol use: Not Currently    Drug use: No         Family History   I have reviewed this patient's family history and updated it with pertinent information if needed.  Family History   Problem Relation Age of Onset    C.A.D. Father     Emphysema Father     Melanoma No family hx of     Skin Cancer No family hx of          Allergies   Allergies   Allergen Reactions    Omeprazole Itching    Pantoprazole Itching    Prevacid [Lansoprazole] Itching    Lasix [Furosemide] Rash    Lidocaine Blisters and Rash     Allergy to lidocaine ointment  Allergy to lidocaine ointment      Penicillin G Rash    Penicillins Rash     \"broke out from injection\" 60 yrs ago  Tolerates cephalosporins        Physical Exam   Vital Signs: Temp: 97.5  F (36.4  C) Temp src: Oral BP: 111/62 Pulse: 97   Resp: 22 SpO2: 94 % O2 Device: Nasal cannula Oxygen Delivery: 3 LPM  Weight: 0 lbs 0 oz    General: Awake, alert, no acute distress  HEENT: Head is normocephalic, atraumatic, pupils are equally round and reactive to light  RESP: Bilateral air entry, mild crackles at bases, no wheezing.  CV: S1-S2, mild tachycardia, no murmurs, no rubs  GI: Abdomen is soft, nontender, nondistended, bowel sounds are present  EXTREM: No leg swellings, no calf tenderness, his left ankle is wrapped up  NEURO: Alert, oriented to self and partially to time; no focal neurological deficits  PSCH: Normal mood normal affect    Medical Decision Making       MANAGEMENT DISCUSSED with the following over the past 24 hours: the patient, ER attending   NOTE(S)/MEDICAL RECORDS REVIEWED over the " past 24 hours: prior hospitalization note, ID note  Tests REVIEWED in the past 24 hours:  - BMP  - CBC  - Lactic Acid  - Troponin  Tests personally interpreted in the past 24 hours:  - CHEST XRAY showing as above  - ABDOMINAL CT showing as above       Data     I have personally reviewed the following data over the past 24 hrs:    20.2 (H)  \   10.1 (L)   / 142 (L)     138 105 23.1 (H) /  169 (H)   4.8 21 (L) 1.59 (H) \     ALT: 16 AST: 13 AP: 122 TBILI: 0.6   ALB: 3.3 (L) TOT PROTEIN: 6.4 LIPASE: 12 (L)     Trop: 83 (H) BNP: N/A     Procal: N/A CRP: N/A Lactic Acid: 1.9       INR:  1.15 PTT:  N/A   D-dimer:  N/A Fibrinogen:  N/A       Imaging results reviewed over the past 24 hrs:   Recent Results (from the past 24 hour(s))   XR Chest Port 1 View    Narrative    EXAM: XR CHEST PORT 1 VIEW  LOCATION: [facility]  DATE: 9/12/2023    INDICATION: Hypoxia.  COMPARISON: 08/16/2023.      Impression    IMPRESSION: Pulmonary emphysema and chronic interstitial changes, with reticulonodular appearance in the right lung base. Superimposed mild interstitial edema is difficult to exclude.     Suture material also overlies the right lung base. Small right pleural effusion. No pneumothorax.    Mild cardiomegaly. Atherosclerosis of the thoracic aorta. Postsurgical changes of median sternotomy. Aortic valve prosthesis.    Suture anchor at the left proximal humerus.   Head CT w/o contrast    Narrative    EXAM: CT HEAD W/O CONTRAST  LOCATION: [facility]  DATE: 9/12/2023    INDICATION: Found on bathroom floor likely syncope iso C diff and significant volume loss.  COMPARISON: Brain MRI 06/28/2023.  TECHNIQUE: Routine CT Head without IV contrast. Multiplanar reformats. Dose reduction techniques were used.    FINDINGS:  INTRACRANIAL CONTENTS: No intracranial hemorrhage, extraaxial collection, or mass effect.  No CT evidence of acute infarct. Mild presumed chronic small vessel ischemic  changes. Moderate generalized volume loss. No hydrocephalus.     VISUALIZED ORBITS/SINUSES/MASTOIDS: No intraorbital abnormality. No paranasal sinus mucosal disease. No middle ear or mastoid effusion.    BONES/SOFT TISSUES: No acute abnormality.      Impression    IMPRESSION:  1.  No CT evidence for acute intracranial process.  2.  Brain atrophy and presumed chronic microvascular ischemic changes as above.   Abd/pelvis CT,  IV  contrast only TRAUMA / AAA    Narrative    EXAM: CT ABDOMEN PELVIS W CONTRAST  LOCATION: [facility]  DATE: 9/12/2023    INDICATION: Found down syncope, hx of c diff infection  COMPARISON: CT 8/17/2023 and 10/9/2022  TECHNIQUE: CT scan of the abdomen and pelvis was performed following injection of IV contrast. Multiplanar reformats were obtained. Dose reduction techniques were used.  CONTRAST: 75 mL Isovue 370    FINDINGS:   LOWER CHEST: Sternotomy with aortic valve replacement. Trace bilateral pleural effusions with adjacent atelectasis, right greater than left. Bibasilar emphysema and fibrotic changes. Stable 7 mm left lower lobe nodule. Recommend attention on follow-up.   The other previously noted nodule is not well seen on this exam.    HEPATOBILIARY: Stable small hypodense hepatic lesions, likely cysts. Normal gallbladder and bile ducts.    PANCREAS: Stable atrophy.    SPLEEN: Normal.    ADRENAL GLANDS: Normal.    KIDNEYS/BLADDER: Stable atrophy and symmetric bilateral perinephric stranding which is likely chronic. No hydronephrosis. Moderately distended bladder.    BOWEL: Small hiatal hernia. Ventral hernia repair. Normal caliber small bowel without focal mural thickening. Appendectomy. Pancolitis with moderate diffuse colonic wall thickening and mild surrounding edema. Similar inflammatory mural thickening and   edema involving the rectum. Mild mesenteric and body wall edema. No free air or free fluid.    LYMPH NODES: Normal.    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 Moderate to severe aortoiliac atherosclerosis. Patent central SMA and SMV.    PELVIC ORGANS: Normal.    MUSCULOSKELETAL: Spinal and pelvic degenerative changes. Osseous demineralization. Lower lumbar laminectomy. Stable L4 vertebral body compression fracture.      Impression    IMPRESSION:   1.  Proctitis and pancolitis which may reflect an infectious or inflammatory process. No abscess or free air.  2.  Trace bilateral pleural effusions.

## 2023-09-13 NOTE — PHARMACY-ADMISSION MEDICATION HISTORY
Pharmacist Admission Medication History    Admission medication history is complete. The information provided in this note is only as accurate as the sources available at the time of the update.    Medication reconciliation/reorder completed by provider prior to medication history? No    Information Source(s): Patient, Hospital records, and CareEverywhere/SureScripts via in-person    Pertinent Information:   - Pt somewhat hard of hearing, pt reported taking medications as listed  - Duloxetine: No refill history, however patient reports taking  - Breo Ellipta: Patient reports that this is too expensive and does not have.  - Tiotropium: No refill history, unsure if patient taking based on conversation  - Pt recently admitted 8/16/23  - Flomax: Unknown if pt taking, did not seem to recognize medication during interview, marked as unknown.    Changes made to PTA medication list:  Added: None  Deleted:   Tamsulosin  Changed: None    Medication Affordability:       Allergies reviewed with patient and updates made in EHR: RN already verified    Medication History Completed By: Maria R Winston [facility] 9/13/2023 9:00 AM    Prior to Admission medications    Medication Sig Last Dose Taking? Auth Provider Long Term End Date   acetaminophen (TYLENOL) 500 MG tablet Take 1,000 mg by mouth 3 times daily as needed prn at prn Yes Reported, Patient     albuterol (PROAIR HFA/PROVENTIL HFA/VENTOLIN HFA) 108 (90 Base) MCG/ACT inhaler Inhale 2 puffs into the lungs every 6 hours as needed prn at prn Yes Reported, Patient Yes    albuterol (PROVENTIL) (2.5 MG/3ML) 0.083% neb solution Take 1 vial (2.5 mg) by nebulization every 6 hours as needed for shortness of breath / dyspnea or wheezing prn at prn Yes Karson Bishop MD Yes    atorvastatin (LIPITOR) 10 MG tablet Take 1 tablet (10 mg) by mouth daily Past Week Yes Michael Donaldson MD Yes    DULoxetine (CYMBALTA) 60 MG capsule Take 60 mg by mouth daily Past Week Yes Reported, Patient No     famotidine (PEPCID) 40 MG tablet Take 1 tablet (40 mg) by mouth 2 times daily  Patient taking differently: Take 40 mg by mouth daily Past Week Yes Karson Bishop MD     ferrous sulfate (FE TABS) 325 (65 Fe) MG EC tablet Take 1 tablet (325 mg) by mouth 2 times daily Past Week Yes Reported, Patient     gabapentin (NEURONTIN) 300 MG capsule Take 3 capsules (900 mg) by mouth At Bedtime For neuropathy pain  Patient taking differently: Take 600 mg by mouth At Bedtime For neuropathy pain Past Week Yes Karson Bishop MD Yes    tiotropium (SPIRIVA) 18 MCG inhaled capsule Inhale 18 mcg into the lungs daily Unknown Unknown Unknown, Entered By History No    vancomycin (VANCOCIN) 125 MG capsule vancomycin prolonged taper:  250 4 times daily for 7 days then 125 mg 4 times daily for 2 weeks then 125 twice daily for 2 weeks then 125 daily for 4 weeks then 125 every other day for 4 weeks then stop and watch. Be  ready to restart immediately if diarrhea relapses. Past Week Yes Evan Newton MD     fluticasone-vilanterol (BREO ELLIPTA) 200-25 MCG/ACT inhaler Inhale 1 puff into the lungs daily not taking at not taking  Reported, Patient

## 2023-09-14 NOTE — PROGRESS NOTES
[facility]    Infectious Disease Progress Note    Date of Service : 09/14/2023       Assessment:  89 YM with multiple medical conditions, hx of C.diff colitis in July with relapse in August, was to be on prolonged pulse oral vancomycin taper, but discontinued vancomycin as diarrhea improved, and has now been admitted with recurrent diarrhea, leukocytosis and hypotension related to C.diff relapse     -Recurrent C.diff colitis with hypotension and marked leukocytosis  -FRED  -COPD and right lung adenocarcinoma  -Atrial fibrillation  -Chronic kidney disease  -chronic medical conditions- hx of DVT/PE, atrial fibrillation, HTN, BPH, non Hodgkin lymphoma, GERD     Recommendations:  Treat with oral vancomycin 125 mg PO QID X 14 days followed by pulse taper, 125 mg PO BID X 1 week, 125 PO daily X 1 week, 125 mg every other day X 2 weeks, 125 mg every 3 days X 2 weeks then discontinue.  Discontinue metronidazole by tomorrow  Will need out patient GI evaluation for FMT    Recommendations as above, ID will sign off  Galilea Moses MD    Interval History   Better today, afebrile, leukocytosis is improving, renal functions also improving, diarrhea better    Physical Exam   Temp: 97.5  F (36.4  C) Temp src: Oral BP: 115/54 Pulse: 88   Resp: 19 SpO2: 93 % O2 Device: Nasal cannula Oxygen Delivery: 2 LPM  Vitals:    09/13/23 0300   Weight: 69.3 kg (152 lb 12.5 oz)     Vital Signs with Ranges  Temp:  [97.5  F (36.4  C)-98.2  F (36.8  C)] 97.5  F (36.4  C)  Pulse:  [] 88  Resp:  [18-19] 19  BP: (115-142)/(54-81) 115/54  SpO2:  [93 %-96 %] 93 %    Constitutional: Awake, alert, cooperative, no apparent distress  Lungs: non labored breathing  Abdomen: diffuse abdominal tenderness  Skin: No rash    Other:    Medications    sodium chloride 100 mL/hr at 09/14/23 0123      atorvastatin  10 mg Oral Daily    DULoxetine  60 mg Oral Daily    famotidine  40 mg Oral Daily    ferrous sulfate  325 mg Oral BID     fluticasone-vilanterol  1 puff Inhalation Daily    gabapentin  900 mg Oral At Bedtime    metroNIDAZOLE  500 mg Intravenous Q8H    sodium chloride (PF)  3 mL Intracatheter Q8H    tamsulosin  0.4 mg Oral Daily    umeclidinium  1 puff Inhalation Daily    vancomycin  125 mg Oral 4x Daily       Data   All microbiology laboratory data reviewed.  Recent Labs   Lab Test 09/14/23 0926 09/13/23 0458 09/12/23  1628   WBC 12.1* 20.2* 25.5*   HGB 9.8* 10.1* 11.5*   HCT 31.6* 31.3* 38.1*   MCV 86 85 87   * 142* 188     Recent Labs   Lab Test 09/14/23 0926 09/13/23 0459 09/12/23  1628   CR 1.19* 1.38* 1.59*     Recent Labs   Lab Test 10/06/22  1526   SED 43*

## 2023-09-14 NOTE — PLAN OF CARE
Summary: Recurrent colitis, c-diff   DATE & TIME: 9/13/23 1900- 9/14/23 6230  Cognitive Concerns/ Orientation : A&O x4. Calm & cooperative. [place]. Forgetful  BEHAVIOR & AGGRESSION TOOL COLOR: Green             ABNL VS/O2: VSS on 2L NC (baseline 2-3L O2)  MOBILITY: A x1 GB/W; moves well in bed.  PAIN MANAGMENT: Denies  DIET: Regular  BOWEL/BLADDER: Multiple incontinent loose stools. Incontinent of bladder.  ABNL LAB/BG: None new  DRAIN/DEVICES: R PIV infusing NS@100mL/hr   TELEMETRY RHYTHM: NSR with BBB  SKIN: Many small skin tears, skin is very fragile. Scattered bruising, scabbing, and abrasions. Coccyx, heels, and elbows blanchable. Feet flaky, peeling. Mild rash between buttock, barrier cream applied.  TESTS/PROCEDURES: None  D/C DAY/GOALS/PLACE: Pending improvement. Pt lives at home with his wife.  OTHER IMPORTANT INFO: PO Vanco QID. IV Flagyl q8h. ID following. Enteric precautions maintained for C-diff.

## 2023-09-14 NOTE — PROGRESS NOTES
[facility]    Medicine Progress Note - Hospitalist Service    Date of Admission:  9/12/2023    Assessment & Plan     Hermilo Velasquez is a 89 year old male with h/o recurrent C. difficile colitis, adenocarcinoma of the right lung, COPD/emphysema with chronic hypoxic respiratory failure, on 2L of oxygen at home, DVT/PE, paroxysmal atrial fibrillation, hypertension, CKD stage III, dyslipidemia, BPH, non-Hodgkin lymphoma, GERD, who presented on 9/12/2023 with diarrhea and hypotension due to recurrent C. difficile colitis with sepsis.      He was diagnosed with C. difficile initially in July 2023 and treated with 10 days of oral vancomycin.  His this reoccurred in August.  Was treated with a prolonged vancomycin taper.  His diarrhea improved and he stopped vancomycin before completing the course and now presented with recurrence.     Recurrent C. difficile colitis, with sepsis  Hypotension-secondary to sepsis.  Resolved  Unwitnessed fall at home-secondary to acute illness and hypotension  - Diarrhea started 2 days before admission.  Had unwitnessed fall on day of admission.  Was hypotensive and tachycardic when EMS arrived.  Responded to IV fluids.  Blood pressure now improved and in normal range.  - Lactate was 3.5, WBC 25.5.  Leukocytosis has improved to 12  - CT scan showed - Proctitis and pancolitis. No abscess or free air.   - Tested positive for C. Difficile  - Was started on p.o. vancomycin and IV metronidazole.  Infectious disease consulted.  - Continue IV metronidazole for another 24 hours.  - oral vancomycin 125 mg PO QID X 14 days followed by pulse taper, 125 mg PO BID X 1 week, 125 PO daily X 1 week, 125 mg every other day X 2 weeks, 125 mg every 3 days X 2 weeks then discontinue.   - Outpatient follow-up with GI for evaluation for fecal transplant  - PT/OT evaluation.  TCU recommended       Adenocarcinoma of the right lung, diagnosed 2019  New lung nodules  - follows with  [place] [facility].  Was diagnosed with stage I adenocarcinoma of right lower lobe in 2019 and is s/p limited thoracotomy with wedge resection in March 2019.    - CT chest on 9/11 - Multiple new pulmonary nodules. One of these nodules measuring 1.8 cm is located adjacent to surgical sutures in the right lower lobe and concerning for recurrent malignancy. Multiple other new pulmonary nodules are seen bilaterally that  may reflect worsening metastatic disease. Differential diagnosis includes atypical infection. Enlarging small right pleural effusion.  - Hem onc consulted.  Planning outpatient PET scan in 2 weeks and then follow-up with Dr. Dreyer to discuss if patient wishes to proceed with biopsy     COPD with emphysema  Chronic hypoxic respiratory failure on oxygen 2 L  -Respiratory status stable at baseline   -Continue supplemental oxygen  -Continue Breo inhaler and Incruse     Severe aortic stenosis, s/p bioprosthetic aortic valve replacement  Dyslipidemia  - Continue atorvastatin     Elevated troponin, likely demand ischemia  - Noted to have mildly elevated troponins of 97 on admission but trending down 83, 80.  No chest pain  - This likely represents demand ischemia in the setting of sepsis and hypotension  - Echo 9/13 showed atrial fibrillation, bioprosthetic aortic valve well-seated without signs of periprosthetic regurgitation.  Normal LVEF of 55-60%, septal motion consistent with conduction abnormality.  RV not well visualized, appears moderately dilated with mildly decreased function trace mitral and tricuspid regurgitation.  No significant change compared to 11/2022    CKD stage 3  - Baseline creatinine around 1.2-1.4. Creatinine 1.59 on admission, now improved to 1.19.  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  Had peripheral neuropathy.  Treated with rituximab,  completed in 2012  - IgM levels in March 2023 were normal.  Repeat testing pending  -Continue gabapentin    Chronic normocytic anemia  - Has multifactorial anemia- due to lymphoma, iron deficiency due to GI bleed and anemia of chronic disease  - Hemoglobin noted to be low this admission.  Iron studies showed low iron with 8% saturation, low iron binding capacity indicating mixed anemia-due to iron deficiency and anemia of chronic disease.  Has received IV iron in past and is normally on p.o. supplement  -Continue ferrous sulfate    Mild thrombocytopenia  - Platelets 131k   - monitor         Diet: Combination Diet Regular Diet Adult    DVT Prophylaxis: Pneumatic Compression Devices  Suazo Catheter: Not present  Lines: None     Cardiac Monitoring: ACTIVE order. Indication: Tachyarrhythmias, acute (48 hours)  Code Status: Full Code      Clinically Significant Risk Factors          # Hypocalcemia: Lowest Ca = 7.9 mg/dL in last 2 days, will monitor and replace as appropriate     # Hypoalbuminemia: Lowest albumin = 3.3 g/dL at 9/12/2023  4:28 PM, will monitor as appropriate       # Hypertension: Noted on problem list    # Chronic heart failure with preserved ejection fraction: heart failure noted on problem list and last echo with EF >50%        # Moderate Malnutrition: based on nutrition assessment  , PRESENT ON ADMISSION   # COPD: noted on problem list          Disposition Plan      Expected Discharge Date: 09/18/2023      Destination: other (comment) (Pt's wife states pt has stated in the past he is not wanting to attend a TCU again. She feels pt would rather go home with home care.)            Glenny Kelly MD  Hospitalist Service  [facility]  Securely message with locr (more info)  Text page via [employer] Paging/Directory   ______________________________________________________________________    Interval History   Reports he is doing okay.  Reports his diarrhea is improving.  Reports abdomen  feels softer.  Has been afebrile.  On usual 2 L of oxygen.  No dizziness.        Physical Exam   Vital Signs: Temp: 97.5  F (36.4  C) Temp src: Oral BP: 115/54 Pulse: 88   Resp: 19 SpO2: 93 % O2 Device: Nasal cannula Oxygen Delivery: 2 LPM  Weight: 152 lbs 12.46 oz    Constitutional - awake and alert, resting in bed, in no acute distress  GI - abdomen is soft, nontender, nondistended  Integumentary - skin is warm and dry, no rashes or ulcers  Neurological - awake, alert and oriented x3.  Moving all 4 extremities, normal speech, no focal deficits    Medical Decision Making       ------------------ MEDICAL DECISION MAKING ------------------------------------------------------------------------------------------------------  Medical complexity over the past 24 hours:  -------------------------- MODERATE RISK FOR MORBIDITY --------------------------------------------------      Data     I have personally reviewed the following data over the past 24 hrs:    12.1 (H)  \   9.8 (L)   / 131 (L)     143 113 (H) 17.5 /  97   3.5 20 (L) 1.19 (H) \     Ferritin:  N/A % Retic:  N/A LDH:  N/A       Imaging results reviewed over the past 24 hrs:   Recent Results (from the past 24 hour(s))   Echocardiogram Complete   Result Value    LVEF  55-60%    Narrative    [id number]  [id number]  [id number]  [id number]^[email or web address]^ROBIN^ALEXEI     [facility]  Echocardiography Laboratory  [address] [place], [place] [address]     Name: CLYDE RODRIGUEZ  MRN: [id number]  : 1932  Study Date: 2023 03:04 PM  Age: 89 yrs  Gender: Male  Patient Location: [facility]  Reason For Study: Afib  Ordering Physician: ROBIN CALERO  Referring Physician: Karson Bishop  Performed By: Herrera Niño RDCS     BSA: 1.9 m2  Height: 70 in  Weight: 152 lb  HR: 83  BP: 124/62 mmHg  ______________________________________________________________________________  Procedure  Complete Portable Echo Adult. [employer] (NDC #[id number]) given  intravenously.  ______________________________________________________________________________  Interpretation Summary     The rhythm is atrial fibrillation.  Status post bioprosthetic aortic valve replacement with 25-mm [employer]  Trifecta prosthesis, 9/3/2015.  The valve is well-seated. No prosthetic or periprosthetic regurgitation. Mean  systolic gradient 14 mmHg (hemoglobin 10.1 g/dL).  Normal left ventricular systolic function. Estimated LVEF 55-60%.  Septal motion is consistent with conduction abnormality.  The right ventricle was not well visualized. Appears modeately dilated with  mildly decreased systolic function.  Trace mitral and tricuspid valve regurgitation.  Inferior vena cava not well visualized.     No significant changes compared to previous study dated 11/7/2022.     ______________________________________________________________________________  Left Ventricle  The left ventricle is normal in size. There is normal left ventricular wall  thickness. Left ventricular systolic function is normal. The visual ejection  fraction is 55-60%. Diastolic function not assessed due to atrial  fibrillation. Septal motion is consistent with conduction abnormality.     Right Ventricle  The right ventricle is moderately dilated. The right ventricle is not well  visualized. Mildly decreased right ventricular systolic function.     Atria  There is moderate biatrial enlargement. There is no color Doppler evidence of  an atrial shunt.     Mitral Valve  The mitral valve leaflets are mildly thickened. There is mild mitral annular  calcification. There is trace mitral regurgitation. There is no mitral valve  stenosis.     Tricuspid Valve  Normal tricuspid valve. There is trace tricuspid regurgitation. The right  ventricular systolic pressure is approximated at 35.9 mmHg plus the right  atrial pressure.     Aortic Valve  There is a bioprosthetic aortic valve. The prosthetic aortic valve is well-  seated. This degree of  valvular regurgitation is within normal limits. Mean  systolic gradient 14 mmHg.     Pulmonic Valve  The pulmonic valve is not well visualized. There is trace pulmonic valvular  regurgitation.     Vessels  The aortic root is normal size. Normal size ascending aorta. Inferior vena  cava not well visualized for estimation of right atrial pressure.     Pericardium  There is no pericardial effusion.     Rhythm  The rhythm was atrial fibrillation.  ______________________________________________________________________________  MMode/2D Measurements & Calculations  IVSd: 1.1 cm     LVIDd: 5.1 cm  LVIDs: 3.8 cm  LVPWd: 1.0 cm  FS: 25.4 %  LV mass(C)d: 197.9 grams  LV mass(C)dI: 106.6 grams/m2  Ao root diam: 2.9 cm  LVOT diam: 2.2 cm  LVOT area: 3.7 cm2  Ao root diam index BSA (cm/m2): 1.6  LA Volume (BP): 85.0 ml  LA Volume Index (BP): 45.7 ml/m2  RV Base: 3.9 cm  RWT: 0.40     TAPSE: 1.0 cm     Doppler Measurements & Calculations  MV E max santi: 87.8 cm/sec  MV A max santi: 111.0 cm/sec  MV E/A: 0.79  MV dec slope: 462.0 cm/sec2  MV dec time: 0.19 sec  Ao V2 max: 265.5 cm/sec  Ao max P.2 mmHg  Ao V2 mean: 177.3 cm/sec  Ao mean P.1 mmHg  Ao V2 VTI: 55.9 cm  АННА(I,D): 1.7 cm2  АННА(V,D): 1.7 cm2  LV V1 max P.2 mmHg  LV V1 max: 123.2 cm/sec  LV V1 VTI: 25.3 cm  SV(LVOT): 92.4 ml  SI(LVOT): 49.7 ml/m2  PA acc time: 0.11 sec  TR max santi: 299.7 cm/sec  TR max P.9 mmHg  AV Santi Ratio (DI): 0.46  АННА Index (cm2/m2): 0.89  E/E' av.4  Lateral E/e': 6.6  Medial E/e': 12.2  RV S Santi: 10.6 cm/sec     ______________________________________________________________________________  Report approved by: Dr Nicole Vu 2023 04:11 PM

## 2023-09-14 NOTE — PROVIDER NOTIFICATION
MD Notification    Notified Person: MD    Notified Person Name: Dr. Denney    Notification Date/Time: 9/13/23 2054    Notification Interaction: Vocera page    Purpose of Notification: Pt asking for his PTA Gabapentin and also for TUMS.    Orders Received: Pending    Comments:

## 2023-09-14 NOTE — PLAN OF CARE
Goal Outcome Evaluation:  Summary: Recurrent colitis, c-diff     DATE & TIME:09/14/2023 [id number]  Cognitive Concerns/ Orientation : A&O x4. Calm & cooperative. [place]. Forgetful  BEHAVIOR & AGGRESSION TOOL COLOR: Green             ABNL VS/O2: VSS on 2L NC (baseline 2-3L O2)  MOBILITY: A x1 GB/W; moves well in bed. But needs to be reminded to move around/ weight shift  PAIN MANAGMENT: Denies  DIET: Regular, good appetite   BOWEL/BLADDER: 2x incont loose stools in brief, they are small/smears. Pt believes that it is coming out with gas. Incont of urine, cannot hold urine when he stands up but does use urinal at times   ABNL LAB/BG: WBC 12.1, Hbg 9.8, platelet 131  DRAIN/DEVICES: R PIV infusing flagyl, d/c'd continuous fluids   TELEMETRY RHYTHM: D/C'd   SKIN: Many small skin tears, skin is very fragile. Scattered bruising, scabbing, and abrasions. Coccyx, heels, and elbows blanchable. Feet flaky, peeling. Mild rash between buttock, barrier cream applied.  TESTS/PROCEDURES: None  D/C DAY/GOALS/PLACE: Pending improvement. Pt lives at home with his wife.  OTHER IMPORTANT INFO: PO Vanco QID. IV Flagyl q8h. ID following. Enteric precautions maintained for C-diff.

## 2023-09-15 NOTE — PROGRESS NOTES
[facility]    Medicine Progress Note - Hospitalist Service    Date of Admission:  9/12/2023    Assessment & Plan     Hermilo Velasquez is a 89 year old male with h/o recurrent C. difficile colitis, adenocarcinoma of the right lung, COPD/emphysema with chronic hypoxic respiratory failure, on 2L of oxygen at home, DVT/PE, paroxysmal atrial fibrillation, hypertension, CKD stage III, dyslipidemia, BPH, non-Hodgkin lymphoma, GERD, who presented on 9/12/2023 with diarrhea and hypotension due to recurrent C. difficile colitis with sepsis.      He was diagnosed with C. difficile initially in July 2023 and treated with 10 days of oral vancomycin.  His this reoccurred in August.  Was treated with a prolonged vancomycin taper.  His diarrhea improved and he stopped vancomycin before completing the course and now presented with recurrence.     Recurrent C. difficile colitis, with sepsis  Hypotension-secondary to sepsis.  Resolved  Unwitnessed fall at home-secondary to acute illness and hypotension  - Diarrhea started 2 days before admission.  Had unwitnessed fall on day of admission.  Was hypotensive and tachycardic when EMS arrived.  Responded to IV fluids.  Blood pressure now improved and in normal range.  - Lactate was 3.5, WBC 25.5.  Leukocytosis has improved to 12  - CT scan showed - Proctitis and pancolitis. No abscess or free air.   - Tested positive for C. Difficile (though GDH and toxin were negative)  - Was started on p.o. vancomycin and IV metronidazole.  Infectious disease consulted.  - Will discontinue IV metronidazole and continue with p.o. vancomycin   - oral vancomycin 125 mg PO QID X 14 days followed by pulse taper, 125 mg PO BID X 1 week, 125 PO daily X 1 week, 125 mg every other day X 2 weeks, 125 mg every 3 days X 2 weeks then discontinue.   - Outpatient follow-up with GI for evaluation for fecal transplant  - PT/OT evaluation.  TCU recommended.  Wife would like him to return home  with home care and not go to TCU.       Adenocarcinoma of the right lung, diagnosed 2019  New lung nodules  - follows with [place] [facility].  Was diagnosed with stage I adenocarcinoma of right lower lobe in 2019 and is s/p limited thoracotomy with wedge resection in March 2019.    - CT chest on 9/11 - Multiple new pulmonary nodules. One of these nodules measuring 1.8 cm is located adjacent to surgical sutures in the right lower lobe and concerning for recurrent malignancy. Multiple other new pulmonary nodules are seen bilaterally that  may reflect worsening metastatic disease. Differential diagnosis includes atypical infection. Enlarging small right pleural effusion.  - Hem onc consulted.  Planning outpatient PET scan in 2 weeks and then follow-up with Dr. Dreyer to discuss if patient wishes to proceed with biopsy     COPD with emphysema  Chronic hypoxic respiratory failure on oxygen 2 L  - Respiratory status stable and at baseline   - Continue supplemental oxygen  - Continue Breo inhaler and Incruse     Severe aortic stenosis, s/p bioprosthetic aortic valve replacement  Dyslipidemia  - Continue atorvastatin     Elevated troponin, likely demand ischemia  - Noted to have mildly elevated troponins of 97 on admission but trending down 83, 80.  No chest pain  - This likely represents demand ischemia in the setting of sepsis and hypotension  - Echo 9/13 showed atrial fibrillation, bioprosthetic aortic valve well-seated without signs of periprosthetic regurgitation.  Normal LVEF of 55-60%, septal motion consistent with conduction abnormality.  RV not well visualized, appears moderately dilated with mildly decreased function trace mitral and tricuspid regurgitation.  No significant change compared to 11/2022    CKD stage 3  - Baseline creatinine around 1.2-1.4. Creatinine 1.59 on admission, now improved to 1.19.  - monitor      GERD-continue Pepcid    BPH  - Continue Flomax    Stage IV lymphoplasmacytic  lymphoma/Waldenstrom's macroglobulinemia  - Diagnosed in 2010.  Had peripheral neuropathy.  Treated with rituximab, completed in 2012  - IgM levels in March 2023 were normal.  Repeat testing pending  - Continue gabapentin    Chronic normocytic anemia  - Has multifactorial anemia- due to lymphoma, iron deficiency due to GI bleed and anemia of chronic disease  - Hemoglobin noted to be low this admission.  Iron studies showed low iron with 8% saturation, low iron binding capacity indicating mixed anemia-due to iron deficiency and anemia of chronic disease.  Has received IV iron in past and is normally on p.o. supplement  - Continue ferrous sulfate    Mild thrombocytopenia  - Platelets 131k   - monitor         Diet: Combination Diet Regular Diet Adult    DVT Prophylaxis: Pneumatic Compression Devices  Suazo Catheter: Not present  Lines: None     Cardiac Monitoring: None  Code Status: Full Code      Clinically Significant Risk Factors              # Hypoalbuminemia: Lowest albumin = 3.3 g/dL at 9/12/2023  4:28 PM, will monitor as appropriate       # Hypertension: Noted on problem list    # Chronic heart failure with preserved ejection fraction: heart failure noted on problem list and last echo with EF >50%        # Moderate Malnutrition: based on nutrition assessment  , PRESENT ON ADMISSION   # COPD: noted on problem list          Disposition Plan      Expected Discharge Date: 09/19/2023      Destination: other (comment) (Pt's wife states pt has stated in the past he is not wanting to attend a TCU again. She feels pt would rather go home with home care.)            Glenny Kelly MD  Hospitalist Service  [facility]  Securely message with [employer] (more info)  Text page via [employer] Paging/Directory   ______________________________________________________________________    Interval History   Reports he is doing okay.  Reports his abdominal distention has improved.  Abdomen is nontender.  Remains on  usual 2-3 L of oxygen.  No dizziness.  Has had 2 episodes of diarrhea this morning.  Complains of cold sores on his upper lip      Physical Exam   Vital Signs: Temp: 98  F (36.7  C) Temp src: Oral BP: 124/58 Pulse: (!) 43   Resp: 18 SpO2: 96 % O2 Device: Nasal cannula Oxygen Delivery: 3 LPM  Weight: 152 lbs 12.46 oz    Constitutional - awake and alert, resting in bed, in no acute distress  GI - abdomen is soft, nontender, nondistended  Integumentary - skin is warm and dry, no rashes or ulcers  Neurological - awake, alert and oriented x3.  Moving all 4 extremities, normal speech, no focal deficits    Medical Decision Making       ------------------ MEDICAL DECISION MAKING ------------------------------------------------------------------------------------------------------  Medical complexity over the past 24 hours:  -------------------------- MODERATE RISK FOR MORBIDITY --------------------------------------------------      Data         Imaging results reviewed over the past 24 hrs:   No results found for this or any previous visit (from the past 24 hour(s)).
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
## 2023-09-15 NOTE — PLAN OF CARE
Summary: Recurrent colitis, C-diff   DATE & TIME:09/15/23 3746-4363  Cognitive Concerns/ Orientation : A&O x3; intermittent confusion; disoriented to time. Calm & cooperative. [place]. Forgetful  BEHAVIOR & AGGRESSION TOOL COLOR: Green             ABNL VS/O2: VSS on 2-3 LPM. Desats to upper 80s with ambulation (baseline 2-3L O2).  MOBILITY: A x1 GB/W; up in chair most of the day, encourage frequent repositioning. Up to BR.   PAIN MANAGMENT: Denies, c/o new onset cold sores, Abreva ordered.   DIET: Regular, good appetite   BOWEL/BLADDER: Incontinent of bowels and bladder. Up to BR.   ABNL LAB/BG:  None  DRAIN/DEVICES: PIV SL with int ABX  TELEMETRY RHYTHM: N/A  SKIN: Many small skin tears, skin is very fragile. Scattered bruising, scabbing, and abrasions. Coccyx, heels, and elbows blanchable. Feet flaky, peeling. Mild rash between buttock.  TESTS/PROCEDURES: None  D/C DAY/GOALS/PLACE: Pending improvement.   OTHER IMPORTANT INFO: PO Vanco QID. IV Flagyl q8h. ID following. Enteric precautions maintained

## 2023-09-15 NOTE — PLAN OF CARE
Summary: Recurrent colitis, C-diff     DATE & TIME:09/14/2023, 1930 - 2330  Cognitive Concerns/ Orientation : A&O x 3. Calm & cooperative. [place]. Forgetful  BEHAVIOR & AGGRESSION TOOL COLOR: Green             ABNL VS/O2: VSS on 2 LPM. Desat overnight to 85%. O2 increased to 3 LPM with improvements in O2 sat(baseline 2-3L O2)  MOBILITY: A x1 GB/W; moves well in bed. Cued to move around/ weight shift in bed  PAIN MANAGMENT: Denied  DIET: Regular, good appetite   BOWEL/BLADDER: Incontinent of bowels and bladder. Cannot hold urine when he stands up but does use urinal at times   ABNL LAB/BG: WBC 12.1, Hbg 9.8, platelet 131  DRAIN/DEVICES: PIV SL  TELEMETRY RHYTHM: NA  SKIN: Many small skin tears, skin is very fragile. Scattered bruising, scabbing, and abrasions. Coccyx, heels, and elbows blanchable. Feet flaky, peeling. Mild rash between buttock, barrier cream was previously applied.  TESTS/PROCEDURES: None  D/C DAY/GOALS/PLACE: Pending improvement.   OTHER IMPORTANT INFO: PO Vanco QID. IV Flagyl q8h. ID following. Enteric precautions maintained    Goal Outcome Evaluation:      Plan of Care Reviewed With: patient    Overall Patient Progress: improvingOverall Patient Progress: improving

## 2023-09-15 NOTE — PROGRESS NOTES
Care Management Follow Up    Length of Stay (days): 3    Expected Discharge Date: 09/19/2023     Concerns to be Addressed: discharge planning     Patient plan of care discussed at interdisciplinary rounds: Yes    Anticipated Discharge Disposition: Other (Comments) (To be determined)     Anticipated Discharge Services: None  Anticipated Discharge DME: None    Patient/family educated on Medicare website which has current facility and service quality ratings:    Education Provided on the Discharge Plan:    Patient/Family in Agreement with the Plan: other (see comments)    Referrals Placed by CM/SW:    Private pay costs discussed: Not applicable    Additional Information:  Writer able to see that Maria L with [facility] home care stating pt is not current with their services.    Writer attempted to stop by pt's room. Pt is asleep. Writer phone call to pt's wife Roberta. No answer. Writer left voicemail asking for call back.    ARCADIO KhannaW  Social Work  [facility]

## 2023-09-16 NOTE — PLAN OF CARE
"Physical Therapy Discharge Summary    Reason for therapy discharge:    Discharged to home with home therapy.    Progress towards therapy goal(s). [facility] for goal details.  Goals partially met.  Barriers to achieving goals:   discharge from facility.    Therapy recommendation(s):    Continued therapy is recommended.  Rationale/Recommendations:  Per most recent PT note: \"At this time, would recommend TCU prior to return home to increase overall functional independence and safety. Will continue to follow during hospital stay and depending on progress, may be able to return home, will update discharge recommendations as needed. At this time, pt is at increased risk for falls and assist of 1 for safe mobility with FWW.\" .      "

## 2023-09-16 NOTE — PROGRESS NOTES
Summary: Recurrent colitis, C-diff   DATE & TIME:09/16/23 8844-3943  Cognitive Concerns/ Orientation: A&O x3; intermittently confused. Disoriented to time. Calm & cooperative. [place]. Forgetful  BEHAVIOR & AGGRESSION TOOL COLOR: Green             ABNL VS/O2: VSS on 2-3 LPM. O2 in (baseline 2-3L O2).  MOBILITY: A x1 GB/W; up in chair most of the day, encourage frequent repositioning. Up to BR.   PAIN MANAGMENT: Denies  DIET: Regular, good appetite   BOWEL/BLADDER: Incontinent of bowels and bladder at times. 1 large loose incontinent BM this shift.   ABNL LAB/BG:  None  DRAIN/DEVICES: PIV SL   TELEMETRY RHYTHM: N/A  SKIN: Many small skin tears, skin is very fragile. Scattered bruising, scabbing, and abrasions. Coccyx, heels, and elbows blanchable. Feet flaky, peeling. Mild rash between buttock.  TESTS/PROCEDURES: None  D/C DAY/GOALS/PLACE: discharge today. CM following to assist in setting up HHC.   OTHER IMPORTANT INFO: PO [place] QID. ID following. Enteric precautions maintained

## 2023-09-16 NOTE — DISCHARGE SUMMARY
[facility]  Hospitalist Discharge Summary      Date of Admission:  9/12/2023  Date of Discharge:  9/16/2023  Discharging Provider: Glenny Kelly MD  Discharge Service: Hospitalist Service    Discharge Diagnoses   Hospital Course     Hermilo Velasquez is a 89 year old male with h/o recurrent C. difficile colitis, adenocarcinoma of the right lung, COPD/emphysema with chronic hypoxic respiratory failure, on 2L of oxygen, h/o DVT/PE, paroxysmal atrial fibrillation, coagulation, hypertension, CKD stage III, dyslipidemia, BPH, non-Hodgkin lymphoma, GERD, who presented on 9/12/2023 with diarrhea and hypotension due to recurrent C. difficile colitis with sepsis.       He was diagnosed with C. difficile initially in July 2023 and treated with 10 days of oral vancomycin.  Had recurrence in August and was treated with a prolonged vancomycin taper.  His diarrhea improved and he stopped vancomycin before completing the course and now presented with recurrence.     Recurrent C. difficile colitis, with sepsis  Hypotension-secondary to sepsis.  Resolved  Unwitnessed fall at home-secondary to acute illness and hypotension  - Diarrhea started 2 days before admission.  Had unwitnessed fall on day of admission.  Was hypotensive and tachycardic when EMS arrived.  Responded to IV fluids.  Blood pressure improved and in normal range.  - Lactate was 3.5, WBC 25.5.  Leukocytosis improved to 12  - CT scan showed - Proctitis and pancolitis. No abscess or free air.   - Tested positive for C. Difficile (GDH and toxin were negative)  - Was started on p.o. vancomycin and IV metronidazole.  Infectious disease consulted.  IV metronidazole discontinued on 9/16.  - Continue prolonged oral vancomycin taper - 125 mg PO QID X 14 days followed by pulse taper, 125 mg PO BID X 1 week, 125 PO daily X 1 week, 125 mg every other day X 2 weeks, 125 mg every 3 days X 2 weeks then discontinue.   - Outpatient referral to [facility] for  evaluation for fecal transplant  - PT/OT recommended TCU which was declined by patient and his wife.  He was discharged home with home care-PT, OT, RN         Adenocarcinoma of the right lung, diagnosed 2019  New lung nodules  - follows with [facility].  Was diagnosed with stage I adenocarcinoma of right lower lobe in 2019 and is s/p limited thoracotomy with wedge resection in March 2019.    - CT chest on 9/11 - Multiple new pulmonary nodules. One of these nodules measuring 1.8 cm is located adjacent to surgical sutures in the right lower lobe and concerning for recurrent malignancy. Multiple other new pulmonary nodules are seen bilaterally that  may reflect worsening metastatic disease. Differential diagnosis includes atypical infection. Enlarging small right pleural effusion.  - Hem onc consulted.  Planning outpatient PET scan in 2 weeks and then follow-up with Dr. Dreyer to discuss if patient wishes to proceed with biopsy     COPD with emphysema  Chronic hypoxic respiratory failure on oxygen 2 L  - Respiratory status stable and at baseline.  Continue supplemental oxygen 2 L/min  - Continue Breo inhaler and Incruse     Severe aortic stenosis, s/p bioprosthetic aortic valve replacement  Dyslipidemia  - Continue atorvastatin     Elevated troponin, likely demand ischemia  - Noted to have mildly elevated troponins of 97 on admission but trending down 83, 80.  No chest pain  - This likely represents demand ischemia in the setting of sepsis and hypotension  - Echo 9/13 showed atrial fibrillation, bioprosthetic aortic valve well-seated without signs of periprosthetic regurgitation.  Normal LVEF of 55-60%, septal motion consistent with conduction abnormality.  RV not well visualized, appears moderately dilated with mildly decreased function trace mitral and tricuspid regurgitation.  No significant change compared to 11/2022     CKD stage 3  - Baseline creatinine around 1.2-1.4. Creatinine 1.59 on admission, now  improved to 1.19.     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  Had peripheral neuropathy.  Treated with rituximab, completed in 2012  - IgM levels in March 2023 were normal.  Repeat testing pending  - Continue gabapentin     Chronic normocytic anemia  - Has multifactorial anemia- due to lymphoma, iron deficiency due to GI bleed and anemia of chronic disease  - Hemoglobin noted to be low this admission.  Iron studies showed low iron with 8% saturation, low iron binding capacity indicating mixed anemia-due to iron deficiency and anemia of chronic disease.  Has received IV iron in past and is normally on p.o. supplement  - Continue ferrous sulfate     Mild thrombocytopenia  - Platelets 131k        Clinically Significant Risk Factors     # Moderate Malnutrition: based on nutrition assessment      Follow-ups Needed After Discharge   Follow-up Appointments     Follow-up and recommended labs and tests       Follow up with primary care provider, Karson Bishop, within 7 days for   hospital follow- up.  No follow up labs or test are needed.            Unresulted Labs Ordered in the Past 30 Days of this Admission       Date and Time Order Name Status Description    9/12/2023  4:35 PM Blood Culture Peripheral Blood Preliminary     9/12/2023  4:35 PM Blood Culture Peripheral Blood Preliminary             Discharge Disposition   Discharged to home with home care-PT, OT, right  Condition at discharge: Stable    Hospital Course   See above    Consultations This Hospital Stay   PHYSICAL THERAPY ADULT IP CONSULT  OCCUPATIONAL THERAPY ADULT IP CONSULT  INFECTIOUS DISEASES IP CONSULT  HEMATOLOGY & ONCOLOGY IP CONSULT  CARE MANAGEMENT / SOCIAL WORK IP CONSULT    Code Status   Full Code    Time Spent on this Encounter   Glenny MONK MD, personally saw the patient today and spent greater than 30 minutes discharging this patient.        Glenny Kelly MD  [place] [address] [facility]  [address] [place] [address]  Phone: [phone]  ______________________________________________________________________    Physical Exam   Vital Signs: Temp: 97.7  F (36.5  C) Temp src: Oral BP: 123/74 Pulse: 77   Resp: 18 SpO2: 92 % O2 Device: Nasal cannula Oxygen Delivery: 3 LPM  Weight: 157 lbs 13.59 oz    Constitutional - awake and alert, resting in bed, in no acute distress  GI - abdomen is soft, nontender, nondistended  Integumentary - skin is warm and dry, no rashes or ulcers  Neurological - awake, alert and oriented x3.  Moving all 4 extremities, normal speech, no focal deficits       Primary Care Physician   Karson Bishop    Discharge Orders      Home Care Referral      Adult GI  Referral - Consult Only      Reason for your hospital stay    Recurrent c diff     Follow-up and recommended labs and tests     Follow up with primary care provider, Karson Bishop, within 7 days for hospital follow- up.  No follow up labs or test are needed.     Activity    Your activity upon discharge: activity as tolerated     Diet    Follow this diet upon discharge: Regular       Significant Results and Procedures   Results for orders placed or performed during the hospital encounter of 09/12/23   XR Chest Port 1 View    Narrative    EXAM: XR CHEST PORT 1 VIEW  LOCATION: [facility]  DATE: 9/12/2023    INDICATION: Hypoxia.  COMPARISON: 08/16/2023.      Impression    IMPRESSION: Pulmonary emphysema and chronic interstitial changes, with reticulonodular appearance in the right lung base. Superimposed mild interstitial edema is difficult to exclude.     Suture material also overlies the right lung base. Small right pleural effusion. No pneumothorax.    Mild cardiomegaly. Atherosclerosis of the thoracic aorta. Postsurgical changes of median sternotomy. Aortic valve prosthesis.    Suture anchor at the left  proximal humerus.   Head CT w/o contrast    Narrative    EXAM: CT HEAD W/O CONTRAST  LOCATION: [facility]  DATE: 9/12/2023    INDICATION: Found on bathroom floor likely syncope iso C diff and significant volume loss.  COMPARISON: Brain MRI 06/28/2023.  TECHNIQUE: Routine CT Head without IV contrast. Multiplanar reformats. Dose reduction techniques were used.    FINDINGS:  INTRACRANIAL CONTENTS: No intracranial hemorrhage, extraaxial collection, or mass effect.  No CT evidence of acute infarct. Mild presumed chronic small vessel ischemic changes. Moderate generalized volume loss. No hydrocephalus.     VISUALIZED ORBITS/SINUSES/MASTOIDS: No intraorbital abnormality. No paranasal sinus mucosal disease. No middle ear or mastoid effusion.    BONES/SOFT TISSUES: No acute abnormality.      Impression    IMPRESSION:  1.  No CT evidence for acute intracranial process.  2.  Brain atrophy and presumed chronic microvascular ischemic changes as above.   Abd/pelvis CT,  IV  contrast only TRAUMA / AAA    Narrative    EXAM: CT ABDOMEN PELVIS W CONTRAST  LOCATION: [facility]  DATE: 9/12/2023    INDICATION: Found down syncope, hx of c diff infection  COMPARISON: CT 8/17/2023 and 10/9/2022  TECHNIQUE: CT scan of the abdomen and pelvis was performed following injection of IV contrast. Multiplanar reformats were obtained. Dose reduction techniques were used.  CONTRAST: 75 mL Isovue 370    FINDINGS:   LOWER CHEST: Sternotomy with aortic valve replacement. Trace bilateral pleural effusions with adjacent atelectasis, right greater than left. Bibasilar emphysema and fibrotic changes. Stable 7 mm left lower lobe nodule. Recommend attention on follow-up.   The other previously noted nodule is not well seen on this exam.    HEPATOBILIARY: Stable small hypodense hepatic lesions, likely cysts. Normal gallbladder and bile ducts.    PANCREAS: Stable atrophy.    SPLEEN: Normal.    ADRENAL GLANDS:  Normal.    KIDNEYS/BLADDER: Stable atrophy and symmetric bilateral perinephric stranding which is likely chronic. No hydronephrosis. Moderately distended bladder.    BOWEL: Small hiatal hernia. Ventral hernia repair. Normal caliber small bowel without focal mural thickening. Appendectomy. Pancolitis with moderate diffuse colonic wall thickening and mild surrounding edema. Similar inflammatory mural thickening and   edema involving the rectum. Mild mesenteric and body wall edema. No free air or free fluid.    LYMPH NODES: Normal.    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 Moderate to severe aortoiliac atherosclerosis. Patent central SMA and SMV.    PELVIC ORGANS: Normal.    MUSCULOSKELETAL: Spinal and pelvic degenerative changes. Osseous demineralization. Lower lumbar laminectomy. Stable L4 vertebral body compression fracture.      Impression    IMPRESSION:   1.  Proctitis and pancolitis which may reflect an infectious or inflammatory process. No abscess or free air.  2.  Trace bilateral pleural effusions.   Echocardiogram Complete     Value    LVEF  55-60%    Narrative    [id number]  [id number]  [id number]  [id number]^ABDIAS^ROBIN^ALEXEI     [facility]  Echocardiography Laboratory  [address] [place] [address]     Name: CLYDE RODRIGUEZ  MRN: [id number]  : 1932  Study Date: 2023 03:04 PM  Age: 89 yrs  Gender: Male  Patient Location: [facility]  Reason For Study: Afib  Ordering Physician: ROBIN CALERO  Referring Physician: Karson Bishop  Performed By: Herrera Niño RDCS     BSA: 1.9 m2  Height: 70 in  Weight: 152 lb  HR: 83  BP: 124/62 mmHg  ______________________________________________________________________________  Procedure  Complete Portable Echo Adult. Optison (NDC #5509-2692) given intravenously.  ______________________________________________________________________________  Interpretation Summary     The  rhythm is atrial fibrillation.  Status post bioprosthetic aortic valve replacement with 25-mm [employer]  Trifecta prosthesis, 9/3/2015.  The valve is well-seated. No prosthetic or periprosthetic regurgitation. Mean  systolic gradient 14 mmHg (hemoglobin 10.1 g/dL).  Normal left ventricular systolic function. Estimated LVEF 55-60%.  Septal motion is consistent with conduction abnormality.  The right ventricle was not well visualized. Appears modeately dilated with  mildly decreased systolic function.  Trace mitral and tricuspid valve regurgitation.  Inferior vena cava not well visualized.     No significant changes compared to previous study dated 11/7/2022.     ______________________________________________________________________________  Left Ventricle  The left ventricle is normal in size. There is normal left ventricular wall  thickness. Left ventricular systolic function is normal. The visual ejection  fraction is 55-60%. Diastolic function not assessed due to atrial  fibrillation. Septal motion is consistent with conduction abnormality.     Right Ventricle  The right ventricle is moderately dilated. The right ventricle is not well  visualized. Mildly decreased right ventricular systolic function.     Atria  There is moderate biatrial enlargement. There is no color Doppler evidence of  an atrial shunt.     Mitral Valve  The mitral valve leaflets are mildly thickened. There is mild mitral annular  calcification. There is trace mitral regurgitation. There is no mitral valve  stenosis.     Tricuspid Valve  Normal tricuspid valve. There is trace tricuspid regurgitation. The right  ventricular systolic pressure is approximated at 35.9 mmHg plus the right  atrial pressure.     Aortic Valve  There is a bioprosthetic aortic valve. The prosthetic aortic valve is well-  seated. This degree of valvular regurgitation is within normal limits. Mean  systolic gradient 14 mmHg.     Pulmonic Valve  The pulmonic valve is not well  visualized. There is trace pulmonic valvular  regurgitation.     Vessels  The aortic root is normal size. Normal size ascending aorta. Inferior vena  cava not well visualized for estimation of right atrial pressure.     Pericardium  There is no pericardial effusion.     Rhythm  The rhythm was atrial fibrillation.  ______________________________________________________________________________  MMode/2D Measurements & Calculations  IVSd: 1.1 cm     LVIDd: 5.1 cm  LVIDs: 3.8 cm  LVPWd: 1.0 cm  FS: 25.4 %  LV mass(C)d: 197.9 grams  LV mass(C)dI: 106.6 grams/m2  Ao root diam: 2.9 cm  LVOT diam: 2.2 cm  LVOT area: 3.7 cm2  Ao root diam index BSA (cm/m2): 1.6  LA Volume (BP): 85.0 ml  LA Volume Index (BP): 45.7 ml/m2  RV Base: 3.9 cm  RWT: 0.40     TAPSE: 1.0 cm     Doppler Measurements & Calculations  MV E max santi: 87.8 cm/sec  MV A max santi: 111.0 cm/sec  MV E/A: 0.79  MV dec slope: 462.0 cm/sec2  MV dec time: 0.19 sec  Ao V2 max: 265.5 cm/sec  Ao max P.2 mmHg  Ao V2 mean: 177.3 cm/sec  Ao mean P.1 mmHg  Ao V2 VTI: 55.9 cm  АННА(I,D): 1.7 cm2  АННА(V,D): 1.7 cm2  LV V1 max P.2 mmHg  LV V1 max: 123.2 cm/sec  LV V1 VTI: 25.3 cm  SV(LVOT): 92.4 ml  SI(LVOT): 49.7 ml/m2  PA acc time: 0.11 sec  TR max santi: 299.7 cm/sec  TR max P.9 mmHg  AV Santi Ratio (DI): 0.46  АННА Index (cm2/m2): 0.89  E/E' av.4  Lateral E/e': 6.6  Medial E/e': 12.2  RV S Santi: 10.6 cm/sec     ______________________________________________________________________________  Report approved by: Dr Nicole Vu 2023 04:11 PM           *Note: Due to a large number of results and/or encounters for the requested time period, some results have not been displayed. A complete set of results can be found in Results Review.       Discharge Medications   Current Discharge Medication List        CONTINUE these medications which have CHANGED    Details   famotidine (PEPCID) 40 MG tablet Take 1 tablet (40 mg) by mouth daily    Associated  Diagnoses: Gastroesophageal reflux disease without esophagitis      gabapentin (NEURONTIN) 300 MG capsule Take 2 capsules (600 mg) by mouth At Bedtime For neuropathy pain    Associated Diagnoses: Peripheral polyneuropathy      vancomycin (VANCOCIN) 125 MG capsule Take 1 capsule (125 mg) by mouth 4 times daily for 10 days, THEN 1 capsule (125 mg) 2 times daily for 7 days, THEN 1 capsule (125 mg) daily for 7 days, THEN 1 capsule (125 mg) every 48 hours for 14 days, THEN 1 capsule (125 mg) every 72 hours for 14 days.  Qty: 73 capsule, Refills: 0    Associated Diagnoses: C. difficile colitis           CONTINUE these medications which have NOT CHANGED    Details   acetaminophen (TYLENOL) 500 MG tablet Take 1,000 mg by mouth 3 times daily as needed      albuterol (PROAIR HFA/PROVENTIL HFA/VENTOLIN HFA) 108 (90 Base) MCG/ACT inhaler Inhale 2 puffs into the lungs every 6 hours as needed      albuterol (PROVENTIL) (2.5 MG/3ML) 0.083% neb solution Take 1 vial (2.5 mg) by nebulization every 6 hours as needed for shortness of breath / dyspnea or wheezing  Qty: 180 mL, Refills: 11      atorvastatin (LIPITOR) 10 MG tablet Take 1 tablet (10 mg) by mouth daily  Qty: 90 tablet, Refills: 0    Associated Diagnoses: Hyperlipidemia LDL goal <100      DULoxetine (CYMBALTA) 60 MG capsule Take 60 mg by mouth daily      ferrous sulfate (FE TABS) 325 (65 Fe) MG EC tablet Take 1 tablet (325 mg) by mouth 2 times daily      tiotropium (SPIRIVA) 18 MCG inhaled capsule Inhale 18 mcg into the lungs daily      fluticasone-vilanterol (BREO ELLIPTA) 200-25 MCG/ACT inhaler Inhale 1 puff into the lungs daily      tamsulosin (FLOMAX) 0.4 MG capsule Take 0.4 mg by mouth daily           Allergies   Allergies   Allergen Reactions    Omeprazole Itching    Pantoprazole Itching    Prevacid [Lansoprazole] Itching    Lasix [Furosemide] Rash    Lidocaine Blisters and Rash     Allergy to lidocaine ointment  Allergy to lidocaine ointment      Penicillin G Rash     "Penicillins Rash     \"broke out from injection\" 60 yrs ago  Tolerates cephalosporins     "

## 2023-09-16 NOTE — PROGRESS NOTES
[facility] Progress Note    Hermilo Velasquez MRN# [id number]   YOB: 1932 Age: 89 year old   Date of Admission: 9/12/2023  Requesting physician: Dr. Membreno  Reason for consult: NSCLC           Assessment and Plan:       NSCLC RLL  --A CT of the chest and PET/CT 3/2019 revealed a slowly enlarging right lower lobe ground glass nodule.   --On 3/26/19 he underwent a limited thoracotomy and wedge resection with pathology revealing a 2.5 cm adenocarcinoma. Final stage [id number] or stage 1.  - CT CAP 3/13/23 EDI   - CT CAP 9/11/23 shows multiple new pulmonary nodules are present. One of these nodules measuring 1.8 cm is located adjacent to surgical sutures in the right lower lobe and concerning for recurrent malignancy. Multiple other new pulmonary nodules are seen bilaterally that  may reflect worsening metastatic disease. Differential diagnosis includes atypical infection  - Dr Dreyer plans to obtain a PET-CT to be done in about 2 weeks outpatient, with plan for follow up with him about 1 week afterward.     Lymphoplasmacytic lymphoma / Waldenstrom's   --stage IV, CD20-positive lymphoplasmacytic lymphoma.  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  -- Neuropathy currently mild to moderate, unchanged, present only in the feet   - IgM levels currently normal, anemia largely unchanged. No CT evidence of disease progression     Hx of Anemia  --related to Lymphoma, iron deficiency 2/2 GI bleeding,  chronic disease  --Iron saturation 27% and ferritin 107 in June 2022  -- required IV iron in 4/29/22   -- Hg 11.5 on June. Pt taking oral iron.   --continue to monitor     Recurrent C difficile colitis  -Tested positive for C. difficile colitis initially in July 2023  with improvement of diarrhea after initial 10 days course of oral vancomycin.    -Recurrent diarrhea in August and admitted to the hospital from 8/16 through 8/19.  He was seen by ID who recommended prolonged vancomycin taper-  -Apparently his diarrhea got better and he stopped taking vancomycin  - now with recurrent diarrhea  CT shows proctitis and pancolitis  - on oral vanco, IV flagyl  - Improving     Unwitnessed fall  - head CT negative for acute pathology    Tico Warner MD  [facility]              Chief Complaint:   Hypotension         Oncologic history:   9/15/12 Diarrhea improving, only 3 episodes thus far today. No B symptoms.     Mr. Velasquez was initially diagnosed in May 2010 when he presented with peripheral neuropathy and an IgM monoclonal gammopathy. A bone marrow biopsy confirmed lymphoplasmacytic lymphoma. The patient was started on treatment with single-agent Rituxan given his peripheral neuropathy, and completed two years of treatment with rituximab given weekly for four doses every six months in June 2012.     Since then, the neuropathy has remained quite minor and stable; and the patient has done quite well without progression.     In November 2015 he was hospitalized while anticoagulated with GI bleeding due to an AVM.     In February 2016 he was hospitalized again with bilateral pulmonary emboli and DVT, pneumonia, and CHF. An IVC filter was placed but he has since done well back on anticoagulation with Coumadin.     On follow up imaging has revealed an enlarging lung nodule.     A CBC and chemistries 3/1/19 were normal.     On 3/4/19 a CT revealed an enlarging right lower lobe ground glass opacity.     A PET/CT 3/12/19 confirmed the lung nodule without other concern.     On 3/26/19 he underwent a limited thoracotomy and wedge resection with pathology revealing a 2.5 cm adenocarcinoma. Final stage [id number] or stage 1.     In 5/2019 he was hospitalized due to sepsis from right hand  "cellulitis.     A chest CT  7/3/19 revealed a small right pleural effusion without malignancy.     A chest CT  1/15/20 remained stable.    Chest CT 20 with unchanged emphysema and no evidence of malignancy    The patient was admitted 8/15/2020 to 2020 with acute blood loss anemia due to a slow GI bleed. Prior to admission Eliquis was held, endoscopy revealed a single nonbleeding angiectasia in the duodenum without active bleeding, and then Eliquis was resumed. A lower extremity ultrasound was negative for DVT.  On 2020 a CT scan of the abdomen pelvis was without acute pathology.    CT chest 20 with new clustered right upper lobe pulmonary nodules that are most likely inflammatory.    Hospitalized 20 - 20 with COPD exacerbation and possible pneumonia, upper GI bleeding, FRED due to dehydration    CT chest 20 with resolved right lung nodules and no evidence for malignancy    CT chest 2021 stable without recurrent or metastatic malignancy  CT CAP  2023 EDI         Physical Exam:   Vitals were reviewed  Blood pressure 123/74, pulse 77, temperature 97.7  F (36.5  C), temperature source Oral, resp. rate 18, height 1.778 m (5' 10\"), weight 71.6 kg (157 lb 13.6 oz), SpO2 92 %.  Temperatures:  Current - Temp: 97.6  F (36.4  C); Max - Temp  Av.7  F (36.5  C)  Min: 97.5  F (36.4  C)  Max: 98.1  F (36.7  C)  Respiration range: Resp  Av.6  Min: 12  Max: 28  Pulse range: Pulse  Av  Min: 85  Max: 141  Blood pressure range: Systolic (24hrs), Av , Min:86 , Max:124   ; Diastolic (24hrs), Av, Min:42, Max:74    Pulse oximetry range: SpO2  Av.6 %  Min: 90 %  Max: 99 %  No intake or output data in the 24 hours ending 23 1051    GENERAL: No acute distress.                 Past Medical History:   I have reviewed this patient's past medical history  Past Medical History:   Diagnosis Date    Adenocarcinoma, lung, right (H) 2019    S/P RLL Wedge resection with " Dr. Vasques     Aortic stenosis     Severe AS, 9/2015 AVR - [facility] TRIFECTA Bovine bioprosthesis 25MM TF-25A    Atrial fibrillation (H)     9/2015 Paroxysmal post op Afib - discharged on Warfarin and a beta blocker    COPD (chronic obstructive pulmonary disease) (H)     Deep vein thrombosis (H)     GERD (gastroesophageal reflux disease)     Heart murmur     Monoclonal gammopathy     plasmacyte prominent causing monoclonal gammopathy    Need for SBE (subacute bacterial endocarditis) prophylaxis     Neuropathy     Other and unspecified hyperlipidemia     Other malignant lymphomas     non hodgkin's lymphoma    RBBB (right bundle branch block)     Severe sepsis with acute organ dysfunction (H) 11/16/2015    Unspecified hereditary and idiopathic peripheral neuropathy              Past Surgical History:   I have reviewed this patient's past surgical history  Past Surgical History:   Procedure Laterality Date    APPENDECTOMY      ARTHROPLASTY KNEE Right 7/25/2022    Procedure: RIGHT TOTAL KNEE ARTHROPLASTY;  Surgeon: Giuliano Deshpande MD;  Location:  OR    AS TOTAL KNEE ARTHROPLASTY      BACK SURGERY      ESOPHAGOSCOPY, GASTROSCOPY, DUODENOSCOPY (EGD), COMBINED N/A 11/28/2015    Procedure: COMBINED ESOPHAGOSCOPY, GASTROSCOPY, DUODENOSCOPY (EGD);  Surgeon: Danis Castillo MD;  Location:  GI    ESOPHAGOSCOPY, GASTROSCOPY, DUODENOSCOPY (EGD), COMBINED N/A 7/26/2018    Procedure: COMBINED ESOPHAGOSCOPY, GASTROSCOPY, DUODENOSCOPY (EGD);;  Surgeon: Toby Dong DO;  Location:  GI    ESOPHAGOSCOPY, GASTROSCOPY, DUODENOSCOPY (EGD), COMBINED N/A 8/17/2020    Procedure: ESOPHAGOGASTRODUODENOSCOPY (EGD);  Surgeon: Herrera Henry MD;  Location:  GI    ESOPHAGOSCOPY, GASTROSCOPY, DUODENOSCOPY (EGD), COMBINED N/A 10/24/2020    Procedure: ESOPHAGOGASTRODUODENOSCOPY (EGD);  Surgeon: Vick Florentino MD;  Location:  GI    ESOPHAGOSCOPY, GASTROSCOPY, DUODENOSCOPY (EGD), COMBINED N/A 4/11/2022    Procedure:  ESOPHAGOGASTRODUODENOSCOPY (EGD);  Surgeon: Vick Florentino MD;  Location:  GI    ESOPHAGOSCOPY, GASTROSCOPY, DUODENOSCOPY (EGD), COMBINED N/A 2022    Procedure: ESOPHAGOGASTRODUODENOSCOPY (EGD);  Surgeon: El Mcgovern MD;  Location:  GI    HERNIA REPAIR  2006    HERNIORRHAPHY VENTRAL  2013    Procedure: HERNIORRHAPHY VENTRAL;  VENTRAL HERNIA REPAIR WITH MESH;  Surgeon: Patel Guzman MD;  Location:  OR    KNEE SURGERY      arthroscopic right knee surgery     LOBECTOMY LUNG Right 3/26/2019    POSSIBLE LOBECTOMY LUNG per Dr. Vasques at [facility]    REPAIR LIGAMENT ANKLE  2012    Procedure:REPAIR LIGAMENT ANKLE; LEFT TARSAL TUNNEL RELEASE OF KNOT OF ARIEL RELEASE; Surgeon:SAUL PUENTE; Location: OR    REPLACE VALVE AORTIC N/A 9/3/2015    Procedure: REPLACE VALVE AORTIC;  Surgeon: Antonino Mitchell MD;  Location:  OR    ROTATOR CUFF REPAIR RT/LT      bilateral    SPINE SURGERY      3 spine surgeries    THORACOTOMY, WEDGE RESECTION LUNG, COMBINED Right 3/26/2019    RIGHT THORACOTOMY, WEDGE RESECTION, RIGHT LOWER LOBE LUNG NODULE,;  Surgeon: Jose A Vasques MD;  Location:  OR    TONSILLECTOMY      TURP                 Social History:   I have reviewed this patient's social history  Social History     Tobacco Use    Smoking status: Former     Packs/day: 2.00     Years: 55.00     Pack years: 110.00     Types: Cigarettes     Quit date: 1998     Years since quittin.6    Smokeless tobacco: Never   Substance Use Topics    Alcohol use: Not Currently             Family History:   I have reviewed this patient's family history  Family History   Problem Relation Age of Onset    C.A.D. Father     Emphysema Father     Melanoma No family hx of     Skin Cancer No family hx of              Allergies:     Allergies   Allergen Reactions    Omeprazole Itching    Pantoprazole Itching    Prevacid [Lansoprazole] Itching    Lasix [Furosemide] Rash    Lidocaine  "Blisters and Rash     Allergy to lidocaine ointment  Allergy to lidocaine ointment      Penicillin G Rash    Penicillins Rash     \"broke out from injection\" 60 yrs ago  Tolerates cephalosporins             Medications:   I have reviewed this patient's current medications  Medications Prior to Admission   Medication Sig Dispense Refill Last Dose    acetaminophen (TYLENOL) 500 MG tablet Take 1,000 mg by mouth 3 times daily as needed   prn at prn    albuterol (PROAIR HFA/PROVENTIL HFA/VENTOLIN HFA) 108 (90 Base) MCG/ACT inhaler Inhale 2 puffs into the lungs every 6 hours as needed   prn at prn    albuterol (PROVENTIL) (2.5 MG/3ML) 0.083% neb solution Take 1 vial (2.5 mg) by nebulization every 6 hours as needed for shortness of breath / dyspnea or wheezing 180 mL 11 prn at prn    atorvastatin (LIPITOR) 10 MG tablet Take 1 tablet (10 mg) by mouth daily 90 tablet 0 Past Week    DULoxetine (CYMBALTA) 60 MG capsule Take 60 mg by mouth daily   Past Week    famotidine (PEPCID) 40 MG tablet Take 1 tablet (40 mg) by mouth 2 times daily (Patient taking differently: Take 40 mg by mouth daily) 60 tablet 3 Past Week    ferrous sulfate (FE TABS) 325 (65 Fe) MG EC tablet Take 1 tablet (325 mg) by mouth 2 times daily   Past Week    gabapentin (NEURONTIN) 300 MG capsule Take 3 capsules (900 mg) by mouth At Bedtime For neuropathy pain (Patient taking differently: Take 600 mg by mouth At Bedtime For neuropathy pain) 270 capsule 3 Past Week    tiotropium (SPIRIVA) 18 MCG inhaled capsule Inhale 18 mcg into the lungs daily   Past Week    vancomycin (VANCOCIN) 125 MG capsule vancomycin prolonged taper:  250 4 times daily for 7 days then 125 mg 4 times daily for 2 weeks then 125 twice daily for 2 weeks then 125 daily for 4 weeks then 125 every other day for 4 weeks then stop and watch. Be  ready to restart immediately if diarrhea relapses. 220 capsule 0 Past Week    fluticasone-vilanterol (BREO ELLIPTA) 200-25 MCG/ACT inhaler Inhale 1 puff " into the lungs daily   not taking at not taking    tamsulosin (FLOMAX) 0.4 MG capsule Take 0.4 mg by mouth daily   unknown     Current Facility-Administered Medications Ordered in Epic   Medication Dose Route Frequency Last Rate Last Admin    acetaminophen (TYLENOL) tablet 1,000 mg  1,000 mg Oral TID PRN        albuterol (PROVENTIL HFA/VENTOLIN HFA) inhaler  2 puff Inhalation Q6H PRN        albuterol (PROVENTIL) neb solution 2.5 mg  2.5 mg Nebulization Q6H PRN        atorvastatin (LIPITOR) tablet 10 mg  10 mg Oral Daily   10 mg at 09/13/23 0821    DULoxetine (CYMBALTA) DR capsule 60 mg  60 mg Oral Daily   60 mg at 09/13/23 0820    [START ON 9/14/2023] famotidine (PEPCID) tablet 40 mg  40 mg Oral Daily        ferrous sulfate (FEROSUL) tablet 325 mg  325 mg Oral BID   325 mg at 09/13/23 0820    fluticasone-vilanterol (BREO ELLIPTA) 200-25 MCG/ACT inhaler 1 puff  1 puff Inhalation Daily   1 puff at 09/13/23 0826    lidocaine (LMX4) cream   Topical Q1H PRN        lidocaine 1 % 0.1-1 mL  0.1-1 mL Other Q1H PRN        melatonin tablet 1 mg  1 mg Oral At Bedtime PRN        metroNIDAZOLE (FLAGYL) infusion 500 mg  500 mg Intravenous Q8H   500 mg at 09/13/23 1050    ondansetron (ZOFRAN ODT) ODT tab 4 mg  4 mg Oral Q6H PRN        Or    ondansetron (ZOFRAN) injection 4 mg  4 mg Intravenous Q6H PRN        sodium chloride (PF) 0.9% PF flush 3 mL  3 mL Intracatheter q1 min prn        sodium chloride (PF) 0.9% PF flush 3 mL  3 mL Intracatheter Q8H        sodium chloride 0.9% infusion   Intravenous Continuous 100 mL/hr at 09/13/23 0214 New Bag at 09/13/23 0214    tamsulosin (FLOMAX) capsule 0.4 mg  0.4 mg Oral Daily   0.4 mg at 09/13/23 0820    umeclidinium (INCRUSE ELLIPTA) 62.5 MCG/ACT inhaler 1 puff  1 puff Inhalation Daily   1 puff at 09/13/23 0826    vancomycin (VANCOCIN) capsule 125 mg  125 mg Oral 4x Daily   125 mg at 09/13/23 0820     No current [facility]-ordered outpatient medications on file.             Review of Systems:      The 10 point Review of Systems is negative other than noted in the HPI.            Data:   Data   No results found. However, due to the size of the patient record, not all encounters were searched. Please check Results Review for a complete set of results.

## 2023-09-16 NOTE — PROGRESS NOTES
Care Management Discharge Note    Discharge Date: 09/16/2023       Discharge Disposition: Home, Home Care    Discharge Services: None    Discharge DME: None    Discharge Transportation: family or friend will provide    Private pay costs discussed: Not applicable    Does the patient's insurance plan have a 3 day qualifying hospital stay waiver?  No    PAS Confirmation Code:    Patient/family educated on Medicare website which has current facility and service quality ratings:      Education Provided on the Discharge Plan:    Persons Notified of Discharge Plans: Bedside RN  Patient/Family in Agreement with the Plan: other (see comments)    Handoff Referral Completed: Yes    Additional Information:  Pt discharge to home with HHC.  Pt accepted by [facility].  Orders sent via [employer]/epic. Information added to AVS  CC called [facility] ([facility]) and updated of discharge.   Follow-up made and added to AVS  Sep 29, 2023  2:00 PM  (Arrive by 1:45 PM)  ED/Hospital Follow Up with Sha Wang MD  [facility] [address], [address]  [facility] [address] [phone]     Bedside RN to review AVS.       Paz Ceja, RN

## 2023-09-16 NOTE — PLAN OF CARE
Summary: Recurrent colitis, C-diff   DATE & TIME:09/15/23, 1930 - 0730  Cognitive Concerns/ Orientation : A&O x3; intermittently confused. Disoriented to time. Calm & cooperative. [place]. Forgetful  BEHAVIOR & AGGRESSION TOOL COLOR: Green             ABNL VS/O2: VSS on 2-3 LPM. O2 in (baseline 2-3L O2).  MOBILITY: A x1 GB/W; up in chair most of the day, encourage frequent repositioning. Up to BR.   PAIN MANAGMENT: Denies, c/o new onset cold sores, Abreva ordered.   DIET: Regular, good appetite   BOWEL/BLADDER: Incontinent of bowels and bladder at times. No BM this shift  ABNL LAB/BG:  None  DRAIN/DEVICES: PIV SL with int ABX  TELEMETRY RHYTHM: N/A  SKIN: Many small skin tears, skin is very fragile. Scattered bruising, scabbing, and abrasions. Coccyx, heels, and elbows blanchable. Feet flaky, peeling. Mild rash between buttock.  TESTS/PROCEDURES: None  D/C DAY/GOALS/PLACE: Pending improvement.   OTHER IMPORTANT INFO: PO Vanco QID. IV Flagyl q8h. ID following. Enteric precautions maintained    Goal Outcome Evaluation:      Plan of Care Reviewed With: patient    Overall Patient Progress: improvingOverall Patient Progress: improving

## 2023-09-16 NOTE — PROGRESS NOTES
Care Management Follow Up    Length of Stay (days): 4    Expected Discharge Date: 09/16/2023     Concerns to be Addressed: discharge planning     Patient plan of care discussed at interdisciplinary rounds: Yes    Anticipated Discharge Disposition: Home, Home Care     Anticipated Discharge Services: [facility]  Anticipated Discharge DME: None    Patient/family educated on Medicare website which has current facility and service quality ratings:    Education Provided on the Discharge Plan:    Patient/Family in Agreement with the Plan: other (see comments)    Referrals Placed by CM/SW: Homecare, Scheduled Follow-up appointments  Private pay costs discussed: Not applicable    Additional Information:  Pt agreeable to [facility].  Referral sent to [facility] via HUB/DOD    Pt would like Follow-up made with PCP (ok with other MD if PCP not available)  Sent to [facility] to schedule     Bedside RN is coordinating meds to be filled at [facility] pharmacy    Wife can transport at discharge.     Paz Ceja RN

## 2023-09-16 NOTE — PROGRESS NOTES
Discharge education provided to pt. Pt understands follow ups and medication regimen. Questions answered. Aware plan for home care. Discharge packet and discharge medication given to pt to take home. Pt discharging home with wife.

## 2023-09-18 NOTE — PLAN OF CARE
"Occupational Therapy Discharge Summary    Reason for therapy discharge:    Discharged to home with home therapy.     Progress towards therapy goal(s). See goals on Care Plan in [facility] electronic health record for goal details.  Goals partially met.  Barriers to achieving goals:   discharge from facility.    Therapy recommendation(s):    Continued therapy is recommended.  Rationale/Recommendations:  Per last OT note: \"Pt below baseline function in self care, ADL. would benefit from TCU. pt with many hospital admissions, fall, illness. likely would benefit significantly from more supportive living such as group home. may be able to progress to home with home therapy pending progress. [facility] 7/30.\"      "

## 2023-09-29 NOTE — PROGRESS NOTES
Assessment & Plan     C. difficile colitis  Recurrent.  Patient is on a vancomycin taper.  A long taper.  May benefit from GI consultation for stool transplant should this Vanco taper not be effective.  - Adult GI  Referral - Consult Only    Chronic respiratory failure with hypoxia (H)  Inhalers are noted.  Compliance is suggested.  No changes are made today    Pulmonary nodules  CT scan from the hospital was reviewed.  The patient did have what sounds like a PET scan last week.  He does have follow-up with [facility] to map out next steps.  I do not have imaging details nor oncology notes to review.       MED REC REQUIRED  Post Medication Reconciliation Status: discharge medications reconciled, continue medications without change      Sha Wang MD  [facility] RENETTA Akhtar is a 89 year old, presenting for the following health issues:  Hospital F/U      HPI     Pt is new to me but not the system  Complex medical history including COPD, Treated lung CA now with recurrent nodules concerning for recurrent malignancy, and recurrent C dif colitis.    Patient was in hospital September 12 through September 16.  Was treated for C. difficile colitis with sepsis.    Work-up included imaging which shows multiple new pulmonary nodules concerning for worsening metastatic disease as well as a small pleural effusion.    Patient was seen by both ID and unk while in house.    He was placed on a long vancomycin taper.    He is here with his wife today.    Tolerating PO.  He notes some intermittent loose stools but overall better.    He has seen [facility] and had what sounds like a PET scan earlier this week.            Hospital Follow-up Visit:    Hospital/Nursing Home/IP Rehab Facility: [facility]  Date of Admission: 9/12/2023  Date of Discharge: 9/16/2023  Reason(s) for Admission: C diffcule Colitis    Was your hospitalization related to COVID-19? No  "  Problems taking medications regularly:  None  Medication changes since discharge: None  Problems adhering to non-medication therapy:  None    Summary of hospitalization:  [facility] discharge summary reviewed  Diagnostic Tests/Treatments reviewed.  Follow up needed: Onc/GI  Other Healthcare Providers Involved in Patient s Care:         Homecare  Update since discharge: stable.         Plan of care communicated with patient and family                   Review of Systems         Objective    /57 (BP Location: Right arm, Patient Position: Sitting, Cuff Size: Adult Regular)   Pulse 65   Temp 98.1  F (36.7  C) (Tympanic)   Resp 10   Ht 1.778 m (5' 10\")   Wt 69.4 kg (152 lb 14.4 oz)   SpO2 92%   BMI 21.94 kg/m    Body mass index is 21.94 kg/m .  Physical Exam   GEN NAD  ENT [place], TM clear B.  Poor dentition, oropharynx clear  RESP: no accessory muscle use. No increase in WOB  ABD:  Soft, mild distension, no rebound or guarding.                        "

## 2023-10-05 NOTE — TELEPHONE ENCOUNTER
[facility] Call Center    Phone Message    May a detailed message be left on voicemail: yes     Reason for Call: Other: Please reach out to pt to schedule echo and follow up TAVR appt.     Action Taken: Other: Cardiology    Travel Screening: Not Applicable    Thank you!  [facility]

## 2023-10-19 NOTE — PATIENT INSTRUCTIONS
Drink lot of fluids to keep your blood pressure up.    Start taking the new tablet Fludrocortisone (FLORINEF) every day to also help keep your blood pressure up and make you feel less tired and dizzy.  I sent an prescription to [facility] on [address].        Return to see me in one week to check on how the new medication is working.  I am going to check your blood count today to make sure you don't have a low blood count that is making you tired.       Stay in the room to schedule your follow up appointment and for your COVID shot and then the assistant will take you to the lab for your blood test.

## 2023-10-19 NOTE — PROGRESS NOTES
Assessment & Plan     Orthostatic hypotension  His blood pressure is really low  He is not on any medications at lower blood pressure other than Flomax and his lower urinary tract symptoms were intolerable when he tried to stop taking that medication in the past  I think we should start Florinef to try to help with the postural blood pressure change I think that this will help with the dizziness and I also counseled him on eating good nutrition and drinking plenty of fluids  I think short-term follow-up is in order to reevaluate this intervention and I plan to see him back next week in person  - fludrocortisone (FLORINEF) 0.1 MG tablet; Take 1 tablet (0.1 mg) by mouth daily    C. difficile colitis  Continue the vancomycin pulsed tapered dose as directed stools are now formed and he is only having a single stool per day    Anemia, unspecified type  He has a history of anemia related to gastrointestinal blood loss and also his hematologic malignancy.  Recheck the hemoglobin today and work-up anemia if it has worsened from baseline.  Consider intravenous iron if iron deficiency is identified.  Consider blood transfusion if the hemoglobin is less than 7.  - CBC with platelets and differential; Future    High priority for 2019-nCoV vaccine        No LOS data to display   Time spent by me doing chart review, history and exam, documentation and further activities per the note     MED REC REQUIRED  Post Medication Reconciliation Status: discharge medications reconciled and changed, per note/orders      Karson Bishop MD  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       Was your hospitalization related to COVID-19? No   Problems taking medications regularly:  None  Medication changes since discharge: None  Problems adhering to non-medication therapy:  None    Summary of hospitalization:  [facility] hospital discharge summary reviewed  Diagnostic Tests/Treatments reviewed.  Follow up needed:    Follow-up Appointments     Follow-up and recommended labs and tests       Follow up with primary care provider, Karson Bishop, within 7 days for   hospital follow- up.  No follow up labs or test are needed.        Other Healthcare Providers Involved in Patient s Care:         None  Update since discharge: stable.             Plan of care communicated with patient and family             Chapito is here with his wife for routine follow-up.  He was hospitalized in September for C. difficile colitis and sepsis.  History is very challenging due to his profound hearing loss.  His wife also has some hearing loss which does not make matters any easier.  He states that he felt dizzy walking into the clinic.  He states that his bowel movements have improved and now he is having 1 formed bowel movement per day and he is finishing up a course of oral vancomycin.  His wife states that he is sleepy and spends most of his day sleeping.      Review of Systems         Objective    BP (!) 77/41   Pulse 80   Temp 97.3  F (36.3  C) (Oral)   Resp 16   Ht 1.778 m (5' 10\")   Wt 67.6 kg (149 lb 1.6 oz)   SpO2 91%   BMI 21.39 kg/m    Body mass index is 21.39 kg/m .  Physical Exam   General: This is a frail elderly man not significantly changed from previous visits.  His standing blood pressure is about 60/40.  He does not complain of feeling dizzy when he stands up.  He does not appear toxic.  Cardiovascular: The heart has an irregularly irregular rhythm with a normal rate.  Pulmonary: " The lungs are clear to auscultation bilaterally, breathing is not labored.  Extremities: No new edema.  Neurological: Alert and oriented to person place and time, but interviewing is very difficult due to his hearing loss I have to type out my questions to him on the computer screen and it takes him a little while to read the message and he usually responds with a one-word answer.  He has symmetric strength and normal gait.

## 2023-10-19 NOTE — RESULT ENCOUNTER NOTE
The following letter pertains to your most recent diagnostic tests:    Good news!  The hemoglobin is improved since last check and is just below the lower limit of the normal range.  This is a very good result for you.      Sincerely,    Dr. Bishop

## 2023-10-22 NOTE — TELEPHONE ENCOUNTER
"Triage Call:     Pt and patient's wife calling to report that his blood pressure is 80/40  Pt denies dizziness, lightheadedness or weakness    They did a re-check of patient's blood pressure while on the phone with writer and it was 96/49  Temp: 96.8  Pulse: 88    Pt's wife states that she does not think that patient is drinking enough fluids. His mouth is moist and he has been urinating OK, but he only had 1/2 cup of coffee to drink today.   Pt was seen on Friday, per wife and he was placed on a medication to increase patient's blood pressure.     Consent to communicate on file.     Disposition: See PCP within 24 hours. Writer gave care advice. Also advised to increase fluids for better hydration. Pt's wife is going to call the clinic tomorrow. Declined to be transferred to scheduling to check appt availability for tomorrow. She was also given the [facility] hours.   Reason for Disposition   [1] Systolic BP  AND [2] taking blood pressure medications AND [3] NOT dizzy, lightheaded or weak    Additional Information   Negative: Shock suspected (e.g., cold/pale/clammy skin, too weak to stand, low BP, rapid pulse)   Negative: Started suddenly after an allergic medicine, an allergic food, or bee sting   Negative: [1] Systolic BP < 90 AND [2] dizzy, lightheaded, or weak   Negative: Fainted   Negative: Chest pain   Negative: Bleeding (e.g., vomiting blood, rectal bleeding or tarry stools, severe vaginal bleeding)(Exception: Fainted from sight of small amount of blood; small cut or abrasion.)   Negative: Extra heartbeats, irregular heart beating, or heart is beating very fast  (i.e., \"palpitations\")   Negative: Sounds like a life-threatening emergency to the triager   Negative: Difficult to awaken or acting confused (e.g., disoriented, slurred speech)   Negative: [1] Systolic BP < 80 AND [2] NOT dizzy, lightheaded or weak   Negative: Abdominal pain   Negative: Fever > 100.4 F (38.0 C)   Negative: Major surgery in the past " month   Negative: [1] Drinking very little AND [2] dehydration suspected (e.g., no urine > 12 hours, very dry mouth, very lightheaded)   Negative: [1] Fall in systolic BP > 20 mm Hg from normal AND [2] dizzy, lightheaded, or weak   Negative: Patient sounds very sick or weak to the triager   Negative: [1] Systolic BP < 90 AND [2] NOT dizzy, lightheaded or weak   Negative: [1] Systolic BP  AND [2] taking blood pressure medications AND [3] dizzy, lightheaded or weak    Protocols used: Blood Pressure - Low-A-  Sammie Padilla RN  [facility] Nurse Advisor 2:10 PM 10/22/2023

## 2023-10-23 NOTE — TELEPHONE ENCOUNTER
"Patient calling with complaint of diarrhea, stating that he believes that the new medication, fludrocortisone, is causing diarrhea. Patient was very difficult to understand and had a hard time understanding writer.    Diarrhea severity: mild, 3x in the past 24 hrs  Onset: \"months ago\"  BM consistency: \"there's some pee in it once in a while\"  Vomiting: denies  Abd pain: denies  Oral intake: \"I drink a lot of water\"  Antibiotics: denies  Other sx: \"black stool\" patient reports that he regularly takes iron pills, but states \"it ain't [normally] that black\" with iron supplementation  BP: 120/69 today on fludrocortisone; denies dizziness    Patient was recently seen in OV on 10/19/23 with PCP, at which GI blood loss was mentioned:      Nurse Triage SBAR    Is this a 2nd Level Triage? YES, LICENSED PRACTITIONER REVIEW IS REQUIRED    Protocol Recommended Disposition:   Go to ED Now    Recommendation:   Writer advised patient to be seen now in ED due to abnormally black stools. Writer asked x4 if patient was willing to go to ED, as well as if patient could be driven to ED. Patient states, \"This is not it [GI bleed]\".     Patient inquired x3 if PCP was available to speak to over the phone, patient is wondering if he should stop taking fludrocotrisone because it's causing diarrhea.    Writer reiterated protocol recommendation to go to the ED due to potential GI bleed, as recent blood pressures have been low prior to fludrocortisone. Patient stated that he wanted to know what PCP thought.    PCP, please advise if patient should be seen immediately in ED, or if patient should be seen in OV or alter fludrocortisone administration.    Patient was advised to call back to clinic immedately with any new or worsening symptoms, patient expressed verbal understanding.    Routed to provider    Does the patient meet one of the following criteria for ADS visit consideration? 16+ years old, with an [facility] PCP     TIP  Providers, please " consider if this condition is appropriate for management at one of our Acute and Diagnostic Services sites.     If patient is a good candidate, please use dotphrase <dot>triageresponse and select Refer to ADS to document.    Callback: [phone] ok to leave a detailed    Theodora Encarnacion RN  -[facility]      Reason for Disposition   Bloody, black, or tarry bowel movements  (Exception: Chronic-unchanged black-grey bowel movements and is taking iron pills or Pepto-Bismol.)    Additional Information   Negative: Shock suspected (e.g., cold/pale/clammy skin, too weak to stand, low BP, rapid pulse)   Negative: Difficult to awaken or acting confused (e.g., disoriented, slurred speech)   Negative: Sounds like a life-threatening emergency to the triager   Negative: Vomiting also present and worse than the diarrhea   Negative: Blood in stool and without diarrhea   Negative: SEVERE abdominal pain (e.g., excruciating) and present > 1 hour   Negative: SEVERE abdominal pain and age > 60 years    Protocols used: Diarrhea-A-OH

## 2023-10-23 NOTE — TELEPHONE ENCOUNTER
He has an appointment to see me on October 30, she cannot wait until then, we did move that appointment up as a same-day slot if needed

## 2023-10-23 NOTE — TELEPHONE ENCOUNTER
Called patient regarding PCP message below. He verbalized understanding. He wants a sooner appointment. Scheduled.    Appointments in Next Year      Oct 26, 2023  4:00 PM  (Arrive by 3:40 PM)  Provider Visit with Karson Bishop MD  [facility] ([facility] ) [phone]         Iraida Galindo RN on 10/23/2023 at 2:16 PM

## 2023-10-25 NOTE — TELEPHONE ENCOUNTER
Dr. Edna SWEENEY/routing    Pt wife called again, triaged pt who wife reported a change in cognitive function/somewhat more mumbly/slightly more disoriented than normal, as well as pt fell last night.  Pt still has all other ongoing sx fatigue, not eating well, not  drinking well, sleeping all the time.     Advised ED for fluids.  Declined ED, but then Agreed to ADS  Pt was accepted for ADS for possible fluids/workup, but then pt decided to not go to ADS.    Wife now stated that pt is drinking more fluids, and refuses ED.  Still plans to keep office visit tomorrow.     Advised of ED dispposition and to call if anything worsens.      Reason for Disposition   Drinking very little and dehydration suspected (e.g., no urine > 12 hours, very dry mouth, very lightheaded)    Additional Information   Negative: Shock suspected (e.g., cold/pale/clammy skin, too weak to stand, low BP, rapid pulse)   Negative: Difficult to awaken or acting confused (e.g., disoriented, slurred speech)   Negative: Sounds like a life-threatening emergency to the triager   Negative: Vomiting also present and worse than the diarrhea   Negative: Blood in stool and without diarrhea   Negative: SEVERE abdominal pain (e.g., excruciating) and present > 1 hour   Negative: SEVERE abdominal pain and age > 60 years   Negative: Bloody, black, or tarry bowel movements  (Exception: Chronic-unchanged black-grey bowel movements and is taking iron pills or Pepto-Bismol.)    Protocols used: Diarrhea-A-OH

## 2023-10-25 NOTE — TELEPHONE ENCOUNTER
Called and spoke with pt to relay provider message below. Pt continues to refuse ED/ADS visit today and states he will wait for tomorrow's visit. Writer relayed importance of being evaluated today rather than waiting, pt refuses to go in today. Understands if worsening/new symptoms will go to ED.    MOLLY HARVEY RN on 10/25/2023 at 1:53 PM

## 2023-10-26 NOTE — PROGRESS NOTES
Assessment & Plan     Altered mental status, unspecified altered mental status type    - UA Macroscopic with reflex to Microscopic and Culture - Lab Collect; Future  - Basic metabolic panel  (Ca, Cl, CO2, Creat, Gluc, K, Na, BUN); Future  - CBC with platelets and differential; Future  - Vitamin B12; Future  - TSH; Future  - CT Head w/o Contrast; Future    Orthostatic hypotension        89-year-old man with multiple medical problems who is seen last week with profound low blood pressure and was started on Florinef to help stabilize orthostatic hypotension.  He subsequently had a fall with head injury although no loss of consciousness followed by an episode of confusion which seems to be improving although he may have some mild residual word finding difficulties complicated by the fact that he is very hard of hearing.  Exam does not show any focal neurological deficits other than some possible word finding difficulties.  Given the history of head injury, I do think we should obtain a head CT scan as soon as possible.  I also would like to check labs to exclude a metabolic derangement or infection that may be contributing to his altered mental state.  If all these tests are negative, and the trend of his mental status is towards improvement, then I think ongoing monitoring is reasonable.  It does seem that the Florinef is helping his blood pressure and I measured his standing blood pressure today and noted that it was 100/60 which is an improvement from last week's visit.      No LOS data to display   Time spent by me doing chart review, history and exam, documentation and further activities per the note           Karson Bishop MD  [facility] RENETTA Akhtar is a 89 year old, presenting for the following health issues:  Diarrhea      HPI       History was primarily provided by his wife as he has profound hearing loss  He had an episode of confusion lasting all day yesterday  At a certain  "point during the day, he could not think of his wife's name according to her report  He also had difficulty finding words  When he woke up this morning, he was nearly back to baseline according to his wife  She did not check his blood pressure at the time  She did not notice any focal weakness  She did note that he has had a fall since his last visit with me and he did strike his head but did not have any loss of consciousness  There has been no cough, dysuria or other localizing signs of infection and the wife has not observed any fevers or chills              Review of Systems         Objective    /63 (BP Location: Right arm, Patient Position: Sitting, Cuff Size: Adult Small)   Pulse 89   Temp 97.3  F (36.3  C) (Oral)   Resp 16   Ht 1.778 m (5' 10\")   Wt 66.7 kg (147 lb 1.6 oz)   SpO2 90%   BMI 21.11 kg/m    Body mass index is 21.11 kg/m .  Physical Exam   General: This is an unchanged appearing, frail, elderly man in no acute distress.  He often laughs and jokes with the examiner.  HEENT: The scalp is normocephalic and atraumatic.  Cardiovascular: The heart has an irregularly irregular rhythm with a normal rate.  Pulmonary: The lungs are clear to auscultation bilaterally, breathing does not appear to be labored.  Neurological: Alert and oriented to person place and time, although he did state the date was the 23rd rather than 26 but he did get the month correct in the year correct and he knew the president and he knew his wife's name today.  He does seem to have more difficulty remembering some words intermittently throughout the interview that he would at his baseline although it is very difficult to assess due to his profound hearing loss.  Cranial nerves II to XII appear grossly intact, no pronator drift, symmetric strength, he walks with a walker.  Mental State: Appropriate mood and affect, well-groomed.  His speech appears to be normal.                "

## 2023-10-27 NOTE — RESULT ENCOUNTER NOTE
The following letter pertains to your most recent diagnostic tests:    Good news! The CT does NOT show any acutely dangerous problems such as bleeding or bruising in the brain.        Sincerely,    Dr. Bishop

## 2023-10-27 NOTE — RESULT ENCOUNTER NOTE
The following letter pertains to your most recent diagnostic tests:    Good news! The lab results do not show any dangerous problems to explain the word finding difficulties.      If the problem fails to improve over the next few days, we could obtain an MRI of the brain to look at the brain in more detail.      Please inform us if that is the case.  My hope is that the problem slowly improves.           Sincerely,    Dr. Bishop

## 2023-11-26 PROBLEM — R50.9 FEVER, UNSPECIFIED FEVER CAUSE: Status: ACTIVE | Noted: 2023-01-01

## 2023-11-26 PROBLEM — I26.99 OTHER PULMONARY EMBOLISM WITHOUT ACUTE COR PULMONALE, UNSPECIFIED CHRONICITY (H): Status: ACTIVE | Noted: 2023-01-01

## 2023-11-26 PROBLEM — J18.9 PNEUMONIA DUE TO INFECTIOUS ORGANISM, UNSPECIFIED LATERALITY, UNSPECIFIED PART OF LUNG: Status: ACTIVE | Noted: 2023-01-01

## 2023-11-26 NOTE — CONSULTS
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   History of recent C. difficile, admitted with recurrent diarrhea and hypoxia  -C. difficile again positive  -Possible pneumonia RLL  -?  Recurrent lung cancer in left lower lobe  -On PET scan from September-known multiple lung nodules disease recurrence as well as FDG avid lesion base of the tongue  -Possible small PE LLL   -History of atrial fibrillation (not on anticoagulation)  -Aortic stenosis  -Peripheral neuropathy affecting the feet-stable  -COPD-on oxygen at night as outpatient  - FULL CODE    PLAN:  - Lung nodules too small to biopsy ( also present on PET from Sept) . Continue to monitor. Confirmed with pt and wife- that he would like treatment if found to have recurrent disease  - Possible PE on CT. Continue heparin for now. Consider lower ext venous doppler US to r/o DVT, High risk for VTE- current smoker and possible cancer recurrence. Can be switched to DOAC's once stable   - Most recent outpt labs for WM stable, and plan to recheck next month. So hold off on testing for now, Hgb stable  - Continue Vancomycin for C diff. With recurrent episode ? ID consult  - On azithromycin for possible pneumonia   -FDG avid lesion base of the tongue noted on PET scan from September.  Patient missed his ENT consult as outpatient. Will need to be rearranged  -Smoking cessation to be strongly encouraged at discharge  -Patient is FULL code    Plan d/w bedside RN and wife. Please feel free to call with any additional questions or concerns    Margo Garrett,  MD  [facility]  [phone] (office);            Chief Complaint:   Shortness of Breath  Diarrhea          History of Present Illness:   This patient is a 89 year old male with PMH significant for   Lung Cancer diagnosed in 2019, lymphoplasmacytic lymphoma diagnosed and treated in 2012, COPD oxygen dependent, current smoker, cardiac history, recent C. difficile (completed oral vancomycin 11/15) admitted overnight-presented with chills, fever, recurrent diarrhea, hypoxia  On admission CT chest PE protocol revealed  Few scattered small filling defects in the subsegmental pulmonary arteries most prominent of the left lower lobe, likely represents small pulmonary emboli.Medial  RLL patchy consolidative pulmonary opacities, along the RLL wedge resection, worrisome for local recurrence versus less likely infection, can be further evaluated with dedicated PET/CT or tissue sampling.  Multiple pulmonary nodules most prominent in the LLL, slightly increased in size as compared to 09/11/2023 exam, likely metastatic.  Small to moderate right pleural effusion. Mild distal colonic and rectal wall thickening, nonspecific, can be seen with acute proctocolitis.   Mildly enlarged heterogenous appearance of the prostate gland, of indeterminate etiology, recommend correlation with serum PSA.    Labs on oflyhavys-HLM-lB 7.3, pCO2 60, pO2 19, bicarb 30.  WBC 11.3, hemoglobin 12, platelets 150, BUN 18, creatinine 1.36.  proBNP 6480, troponin T 62    C diff positive on admission-started on oral vancomycin 4 times a day  Also started on azithromycin for possible pneumonia    ONCOLOGY HISTORY from outpatient chart    --A CT of the chest and PET/CT 3/2019 revealed a slowly enlarging right lower lobe ground glass nodule.   --On 3/26/19 he underwent a limited thoracotomy and wedge resection with pathology revealing a 2.5 cm adenocarcinoma. Final stage [id number] or stage 1.  - CT CAP 3/13/23 EDI   - CT CAP 9/11/23 showed multiple new  pulmonary nodules. One of the nodules, measuring 1.8 cm, it is located adjacent to the surgical sutures in the right lower lobe mass concerning for recurrent malignancy.  Multiple other new pulmonary nodules are seen bilaterally that may reflect worsening metastatic disease.  The differential diagnosis includes atypical infection.  - PET scan 9/22/23 shows moderate FDG activity in the left seventh rib anterolaterally.  This was nonspecific and could represent a lymphoma/myeloma vs the sequelae of trauma. Small LLL pulmonary nodules are too small to characterize.  Also noted was a band of FDG activity in the right lower lobe near the wedge resection, most likely inflammation.  Continued CT or PET/CT surveillance is recommended.  Finally, there is incidental FDG avid soft tissue nodule in the right tongue base that may represent a synchronous squamous cell carcinoma.   Pt with PLAN_ no current left rib pain.  Plan follow up chest CT in about two months to assess pulmonary nodules.      FDG avid lesion involving right tongue base.   - will arrange ENT evaluation-patient was a no-show for the consult on 10/26.    Lymphoplasmacytic lymphoma / Waldenstrom's   --stage IV, CD20-positive lymphoplasmacytic lymphoma.  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  -- Neuropathy currently mild to moderate, unchanged, present only in the feet     11/26/23: Evaluated by me.  Very hard of hearing.  Limited history obtained from the patient.  Also spoke to his wife on the phone.  Apparently was doing well up until yesterday.  Had completed recent antibiotics for C. difficile on 11/15 and had formed stools last few days.  He had recurrent diarrhea again yesterday.  He normally uses oxygen at  night and saturation is about 90% on room air when he wakes up in the morning.  Yesterday she found him having some chills and hypoxic on room air.  Patient himself did not complain of any shortness of breath.  No history of swelling in the legs.  No prior history of blood clots.  Patient is a current smoker and smokes 2 cigarettes a day still.           Physical Exam:   Vitals were reviewed  Blood pressure 119/63, pulse 84, temperature 98  F (36.7  C), temperature source Oral, resp. rate 20, SpO2 92%.  Temperatures:  Current - Temp: 98  F (36.7  C); Max - Temp  Av.2  F (37.9  C)  Min: 98  F (36.7  C)  Max: 102.4  F (39.1  C)  Respiration range: Resp  Av.6  Min: 13  Max: 28  Pulse range: Pulse  Av.2  Min: 84  Max: 129  Blood pressure range: Systolic (24hrs), Av , Min:93 , Max:156   ; Diastolic (24hrs), Av, Min:58, Max:95    Pulse oximetry range: SpO2  Av.1 %  Min: 92 %  Max: 100 %    Intake/Output Summary (Last 24 hours) at 2023 1054  Last data filed at 2023 0600  Gross per 24 hour   Intake 1350 ml   Output --   Net 1350 ml       GENERAL: No acute distress. Very hard of hearing   SKIN: No rashes or jaundice.  HEENT: Normocephalic, atraumatic. Eyes anicteric. Oropharynx is clear. No obvious oral lesion  LYMPH: No palpable lymphadenopathy in the cervical, supraclavicular, axillary, or inguinal regions.  HEART: no leg edema   LUNGS: No SOB with conversation, on O2 via nasal canula   ABDOMEN: Soft, nontender, nondistended with no palpable hepatosplenomegaly.  EXTREMITIES: No clubbing, cyanosis, or edema.  MENTAL: Alert and oriented to person, place, and time.  NEURO: alert              Past Medical History:   I have reviewed this patient's past medical history  Past Medical History:   Diagnosis Date    Adenocarcinoma, lung, right (H) 2019    S/P RLL Wedge resection with Dr. Vasques     Aortic stenosis     Severe AS, 2015 AVR - [facility] TRIFECTA Bovine bioprosthesis 25MM  TF-25A    Atrial fibrillation (H)     9/2015 Paroxysmal post op Afib - discharged on Warfarin and a beta blocker    COPD (chronic obstructive pulmonary disease) (H)     Deep vein thrombosis (H)     GERD (gastroesophageal reflux disease)     Heart murmur     Monoclonal gammopathy     plasmacyte prominent causing monoclonal gammopathy    Need for SBE (subacute bacterial endocarditis) prophylaxis     Neuropathy     Other and unspecified hyperlipidemia     Other malignant lymphomas     non hodgkin's lymphoma    RBBB (right bundle branch block)     Severe sepsis with acute organ dysfunction (H) 11/16/2015    Unspecified hereditary and idiopathic peripheral neuropathy              Past Surgical History:   I have reviewed this patient's past surgical history  Past Surgical History:   Procedure Laterality Date    APPENDECTOMY      ARTHROPLASTY KNEE Right 7/25/2022    Procedure: RIGHT TOTAL KNEE ARTHROPLASTY;  Surgeon: Giuliano Deshpande MD;  Location:  OR    AS TOTAL KNEE ARTHROPLASTY      BACK SURGERY      ESOPHAGOSCOPY, GASTROSCOPY, DUODENOSCOPY (EGD), COMBINED N/A 11/28/2015    Procedure: COMBINED ESOPHAGOSCOPY, GASTROSCOPY, DUODENOSCOPY (EGD);  Surgeon: Danis Castillo MD;  Location: [facility]    ESOPHAGOSCOPY, GASTROSCOPY, DUODENOSCOPY (EGD), COMBINED N/A 7/26/2018    Procedure: COMBINED ESOPHAGOSCOPY, GASTROSCOPY, DUODENOSCOPY (EGD);;  Surgeon: Toby Dong DO;  Location: [facility]    ESOPHAGOSCOPY, GASTROSCOPY, DUODENOSCOPY (EGD), COMBINED N/A 8/17/2020    Procedure: ESOPHAGOGASTRODUODENOSCOPY (EGD);  Surgeon: Herrera Henry MD;  Location:  GI    ESOPHAGOSCOPY, GASTROSCOPY, DUODENOSCOPY (EGD), COMBINED N/A 10/24/2020    Procedure: ESOPHAGOGASTRODUODENOSCOPY (EGD);  Surgeon: Vick Florentino MD;  Location:  GI    ESOPHAGOSCOPY, GASTROSCOPY, DUODENOSCOPY (EGD), COMBINED N/A 4/11/2022    Procedure: ESOPHAGOGASTRODUODENOSCOPY (EGD);  Surgeon: Vick Florentino MD;  Location: [facility]     ESOPHAGOSCOPY, GASTROSCOPY, DUODENOSCOPY (EGD), COMBINED N/A 2022    Procedure: ESOPHAGOGASTRODUODENOSCOPY (EGD);  Surgeon: El Mcgovern MD;  Location:  GI    HERNIA REPAIR  2006    HERNIORRHAPHY VENTRAL  2013    Procedure: HERNIORRHAPHY VENTRAL;  VENTRAL HERNIA REPAIR WITH MESH;  Surgeon: Patel Guzman MD;  Location:  OR    KNEE SURGERY      arthroscopic right knee surgery     LOBECTOMY LUNG Right 3/26/2019    POSSIBLE LOBECTOMY LUNG per Dr. [facility]ques at [facility]    REPAIR LIGAMENT ANKLE  2012    Procedure:REPAIR LIGAMENT ANKLE; LEFT TARSAL TUNNEL RELEASE OF KNOT OF ARIEL RELEASE; Surgeon:SAUL PUENTE; Location: OR    REPLACE VALVE AORTIC N/A 9/3/2015    Procedure: REPLACE VALVE AORTIC;  Surgeon: Antonino Mitchell MD;  Location:  OR    ROTATOR CUFF REPAIR RT/LT      bilateral    SPINE SURGERY      3 spine surgeries    THORACOTOMY, WEDGE RESECTION LUNG, COMBINED Right 3/26/2019    RIGHT THORACOTOMY, WEDGE RESECTION, RIGHT LOWER LOBE LUNG NODULE,;  Surgeon: Jose A Vasques MD;  Location:  OR    TONSILLECTOMY      TURP                 Social History:   I have reviewed this patient's social history  Social History     Tobacco Use    Smoking status: Former     Packs/day: 2.00     Years: 55.00     Additional pack years: 0.00     Total pack years: 110.00     Types: Cigarettes     Quit date: 1998     Years since quittin.8    Smokeless tobacco: Never   Substance Use Topics    Alcohol use: Not Currently             Family History:   I have reviewed this patient's family history  Family History   Problem Relation Age of Onset    C.A.D. Father     Emphysema Father     Melanoma No family hx of     Skin Cancer No family hx of              Allergies:     Allergies   Allergen Reactions    Omeprazole Itching    Pantoprazole Itching    Prevacid [Lansoprazole] Itching    Lasix [Furosemide] Rash    Lidocaine Blisters and Rash     Allergy to lidocaine  "ointment  Allergy to lidocaine ointment      Penicillin G Rash    Penicillins Rash     \"broke out from injection\" 60 yrs ago  Tolerates cephalosporins             Medications:   I have reviewed this patient's current medications  Medications Prior to Admission   Medication Sig Dispense Refill Last Dose    acetaminophen (TYLENOL) 500 MG tablet Take 1,000 mg by mouth 3 times daily as needed    at PRN    albuterol (PROAIR HFA/PROVENTIL HFA/VENTOLIN HFA) 108 (90 Base) MCG/ACT inhaler Inhale 2 puffs into the lungs every 6 hours as needed    at PRN    albuterol (PROVENTIL) (2.5 MG/3ML) 0.083% neb solution Take 1 vial (2.5 mg) by nebulization every 6 hours as needed for shortness of breath / dyspnea or wheezing 180 mL 11  at PRN    atorvastatin (LIPITOR) 10 MG tablet Take 1 tablet (10 mg) by mouth daily 90 tablet 0 11/25/2023 at AM    famotidine (PEPCID) 40 MG tablet TAKE ONE TABLET BY MOUTH TWICE DAILY 180 tablet 0 11/25/2023 at PM    ferrous sulfate (FE TABS) 325 (65 Fe) MG EC tablet Take 1 tablet (325 mg) by mouth 2 times daily   11/25/2023 at PM    fludrocortisone (FLORINEF) 0.1 MG tablet Take 1 tablet (0.1 mg) by mouth daily 90 tablet 3 11/25/2023 at AM    gabapentin (NEURONTIN) 300 MG capsule Take 2 capsules (600 mg) by mouth At Bedtime For neuropathy pain   11/25/2023 at PM    tiotropium (SPIRIVA) 18 MCG inhaled capsule Inhale 18 mcg into the lungs daily   11/25/2023 at AM    fluticasone-vilanterol (BREO ELLIPTA) 200-25 MCG/ACT inhaler Inhale 1 puff into the lungs daily (Patient not taking: Reported on 11/26/2023)   Not Taking     Current Facility-Administered Medications Ordered in Epic   Medication Dose Route Frequency Last Rate Last Admin    acetaminophen (TYLENOL) tablet 650 mg  650 mg Oral Q4H PRN        Or    acetaminophen (TYLENOL) Suppository 650 mg  650 mg Rectal Q4H PRN        albuterol (PROVENTIL) neb solution 2.5 mg  2.5 mg Nebulization Q2H PRN        atorvastatin (LIPITOR) tablet 10 mg  10 mg Oral Daily "        [START ON 11/27/2023] azithromycin (ZITHROMAX) 250 mg in sodium chloride 0.9 % 250 mL intermittent infusion  250 mg Intravenous Q24H        bisacodyl (DULCOLAX) suppository 10 mg  10 mg Rectal Daily PRN        calcium carbonate (TUMS) chewable tablet 1,000 mg  1,000 mg Oral 4x Daily PRN        ceFEPIme (MAXIPIME) 2 g vial to attach to  mL bag for ADULTS or 50 mL bag for PEDS  2 g Intravenous Q12H        famotidine (PEPCID) tablet 40 mg  40 mg Oral BID        fludrocortisone (FLORINEF) tablet 0.1 mg  0.1 mg Oral Daily        fluticasone-vilanterol (BREO ELLIPTA) 200-25 MCG/ACT inhaler 1 puff  1 puff Inhalation Daily        gabapentin (NEURONTIN) capsule 600 mg  600 mg Oral At Bedtime        heparin infusion 25,000 units in D5W 250 mL ANTICOAGULANT  0-5,000 Units/hr Intravenous Continuous 12 mL/hr at 11/26/23 1034 1,200 Units/hr at 11/26/23 1034    HYDROmorphone (DILAUDID) injection 0.2 mg  0.2 mg Intravenous Q2H PRN        HYDROmorphone (PF) (DILAUDID) injection 0.3 mg  0.3 mg Intravenous Q2H PRN        melatonin tablet 1 mg  1 mg Oral At Bedtime PRN        naloxone (NARCAN) injection 0.2 mg  0.2 mg Intravenous Q2 Min PRN        Or    naloxone (NARCAN) injection 0.4 mg  0.4 mg Intravenous Q2 Min PRN        Or    naloxone (NARCAN) injection 0.2 mg  0.2 mg Intramuscular Q2 Min PRN        Or    naloxone (NARCAN) injection 0.4 mg  0.4 mg Intramuscular Q2 Min PRN        ondansetron (ZOFRAN ODT) ODT tab 4 mg  4 mg Oral Q6H PRN        Or    ondansetron (ZOFRAN) injection 4 mg  4 mg Intravenous Q6H PRN        oxyCODONE (ROXICODONE) tablet 5 mg  5 mg Oral Q4H PRN        oxyCODONE IR (ROXICODONE) half-tab 2.5 mg  2.5 mg Oral Q4H PRN        Patient is already receiving anticoagulation with heparin, enoxaparin (LOVENOX), warfarin (COUMADIN)  or other anticoagulant medication   Does not apply Continuous PRN        polyethylene glycol (MIRALAX) Packet 17 g  17 g Oral BID PRN        prochlorperazine (COMPAZINE)  injection 5 mg  5 mg Intravenous Q6H PRN        Or    prochlorperazine (COMPAZINE) tablet 5 mg  5 mg Oral Q6H PRN        Or    prochlorperazine (COMPAZINE) suppository 12.5 mg  12.5 mg Rectal Q12H PRN        senna-docusate (SENOKOT-S/PERICOLACE) 8.6-50 MG per tablet 1 tablet  1 tablet Oral BID PRN        Or    senna-docusate (SENOKOT-S/PERICOLACE) 8.6-50 MG per tablet 2 tablet  2 tablet Oral BID PRN        sodium chloride (PF) 0.9% PF flush 3 mL  3 mL Intracatheter Q8H        sodium chloride (PF) 0.9% PF flush 3 mL  3 mL Intracatheter q1 min prn        sodium chloride (PF) 0.9% PF flush 3 mL  3 mL Intracatheter q1 min prn        sodium chloride (PF) 0.9% PF flush 3 mL  3 mL Intracatheter Q8H        tamsulosin (FLOMAX) capsule 0.4 mg  0.4 mg Oral Daily        umeclidinium (INCRUSE ELLIPTA) 62.5 MCG/ACT inhaler 1 puff  1 puff Inhalation Daily        vancomycin (FIRVANQ) oral solution 125 mg  125 mg Oral 4x Daily   125 mg at 11/26/23 0829     No current [facility]-ordered outpatient medications on file.             Review of Systems:     The 14 point Review of Systems is negative other than noted in the HPI.            Data:   Data   Results for orders placed or performed during the hospital encounter of 11/25/23 (from the past 24 hour(s))   Comprehensive metabolic panel   Result Value Ref Range    Sodium 137 135 - 145 mmol/L    Potassium 3.9 3.4 - 5.3 mmol/L    Carbon Dioxide (CO2) 28 22 - 29 mmol/L    Anion Gap 8 7 - 15 mmol/L    Urea Nitrogen 18.8 8.0 - 23.0 mg/dL    Creatinine 1.36 (H) 0.67 - 1.17 mg/dL    GFR Estimate 49 (L) >60 mL/min/1.73m2    Calcium 8.8 8.2 - 9.6 mg/dL    Chloride 101 98 - 107 mmol/L    Glucose 160 (H) 70 - 99 mg/dL    Alkaline Phosphatase 126 40 - 150 U/L    AST 12 0 - 45 U/L    ALT 7 0 - 70 U/L    Protein Total 6.9 6.4 - 8.3 g/dL    Albumin 3.6 3.5 - 5.2 g/dL    Bilirubin Total 0.6 <=1.2 mg/dL   Troponin T, High Sensitivity   Result Value Ref Range    Troponin T, High Sensitivity 62 (H) <=22 ng/L    Lipase   Result Value Ref Range    Lipase 16 13 - 60 U/L   Procalcitonin   Result Value Ref Range    Procalcitonin 0.21 <0.50 ng/mL   Nt probnp inpatient   Result Value Ref Range    N terminal Pro BNP Inpatient 6,480 (H) 0 - 1,800 pg/mL   CBC with platelets differential    Narrative    The following orders were created for panel order CBC with platelets differential.  Procedure                               Abnormality         Status                     ---------                               -----------         ------                     CBC with platelets and d...[[id number]]  Abnormal            Final result                 Please view results for these tests on the individual orders.   Symptomatic Influenza A/B, RSV, & SARS-CoV2 PCR (COVID-19) Nasopharyngeal    Specimen: Nasopharyngeal; Swab   Result Value Ref Range    Influenza A PCR Negative Negative    Influenza B PCR Negative Negative    RSV PCR Negative Negative    SARS CoV2 PCR Negative Negative    Narrative    Testing was performed using the Xpert Xpress CoV2/Flu/RSV Assay on the [employer] GeneXpert Instrument. This test should be ordered for the detection of SARS-CoV-2, influenza, and RSV viruses in individuals who meet clinical and/or epidemiological criteria. Test performance is unknown in asymptomatic patients. This test is for in vitro diagnostic use under the FDA EUA for laboratories certified under CLIA to perform high or moderate complexity testing. This test has not been FDA cleared or approved. A negative result does not rule out the presence of PCR inhibitors in the specimen or target RNA in concentration below the limit of detection for the assay. If only one viral target is positive but coinfection with multiple targets is suspected, the sample should be re-tested with another FDA cleared, approved, or authorized test, if coinfection would change clinical management. This test was validated by the [facility]. These  laboratories are certified under the Clinical Laboratory Improvement Amendments of 1988 (CLIA-88) as qualified to perform high complexity laboratory testing.   CBC with platelets and differential   Result Value Ref Range    WBC Count 11.3 (H) 4.0 - 11.0 10e3/uL    RBC Count 4.61 4.40 - 5.90 10e6/uL    Hemoglobin 12.0 (L) 13.3 - 17.7 g/dL    Hematocrit 38.9 (L) 40.0 - 53.0 %    MCV 84 78 - 100 fL    MCH 26.0 (L) 26.5 - 33.0 pg    MCHC 30.8 (L) 31.5 - 36.5 g/dL    RDW 15.0 10.0 - 15.0 %    Platelet Count 150 150 - 450 10e3/uL    % Neutrophils 86 %    % Lymphocytes 4 %    % Monocytes 7 %    % Eosinophils 3 %    % Basophils 0 %    % Immature Granulocytes 0 %    NRBCs per 100 WBC 0 <1 /100    Absolute Neutrophils 9.8 (H) 1.6 - 8.3 10e3/uL    Absolute Lymphocytes 0.4 (L) 0.8 - 5.3 10e3/uL    Absolute Monocytes 0.8 0.0 - 1.3 10e3/uL    Absolute Eosinophils 0.3 0.0 - 0.7 10e3/uL    Absolute Basophils 0.0 0.0 - 0.2 10e3/uL    Absolute Immature Granulocytes 0.0 <=0.4 10e3/uL    Absolute NRBCs 0.0 10e3/uL   iStat Gases (lactate) venous, POCT   Result Value Ref Range    Lactic Acid POCT 0.8 <=2.0 mmol/L    Bicarbonate Venous POCT 30 (H) 21 - 28 mmol/L    O2 Sat, Venous POCT 23 (L) 94 - 100 %    pCO2 Venous POCT 60 (H) 40 - 50 mm Hg    pH Venous POCT 7.30 (L) 7.32 - 7.43    pO2 Venous POCT 19 (L) 25 - 47 mm Hg   [place] Draw    Narrative    The following orders were created for panel order [place] Draw.  Procedure                               Abnormality         Status                     ---------                               -----------         ------                     Extra Blue Top Tube[[id number]]                              Final result               Extra Red Top Tube[[id number]]                               Final result                 Please view results for these tests on the individual orders.   Extra Blue Top Tube   Result Value Ref Range    Hold Specimen JIC    Extra Red Top Tube   Result Value Ref Range    Hold  Specimen JIC    EKG 12-lead, tracing only   Result Value Ref Range    Systolic Blood Pressure  mmHg    Diastolic Blood Pressure  mmHg    Ventricular Rate 104 BPM    Atrial Rate  BPM    IA Interval  ms    QRS Duration 136 ms     ms    QTc 418 ms    P Axis  degrees    R AXIS -29 degrees    T Axis 73 degrees    Interpretation ECG       Atrial fibrillation with rapid ventricular response  Right bundle branch block  Abnormal ECG  When compared with ECG of 12-SEP-2023 17:36,  Atrial fibrillation has replaced Wide QRS tachycardia     XR Chest 2 Views    Narrative    EXAM: XR CHEST 2 VIEWS  LOCATION: [facility]  DATE: 11/25/2023    INDICATION: Fever  COMPARISON: 09/12/2023      Impression    IMPRESSION: Borderline cardiomegaly. Previous cardiac valvular surgery. Fullness of central pulmonary vessels similar to previous exam. Mild diffuse interstitial prominence stable but questioning slight more focal right infrahilar infiltrate and possible   developing pneumonia right base.   C. difficile Toxin B PCR with reflex to C. difficile Antigen and Toxins A/B EIA    Specimen: Per Rectum; Stool   Result Value Ref Range    C Difficile Toxin B by PCR Positive (A) Negative    Narrative    The [employer] Xpert C. difficile Assay, performed on the [email or web address]  [employer]ument Systems, is a qualitative in vitro diagnostic test for rapid detection of toxin B gene sequences from unformed (liquid or soft) stool specimens collected from patients suspected of having Clostridioides difficile infection (CDI). The test utilizes automated real-time polymerase chain reaction (PCR) to detect toxin gene sequences associated with toxin producing C. difficile. The Xpert C. difficile Assay is intended as an aid in the diagnosis of CDI.   C. difficile Antigen and Toxins A/B by Enzyme Immunoassay    Specimen: Per Rectum; Stool   Result Value Ref Range    C. difficile GDH Antigen Positive (A) Negative    C. difficile Toxin  Positive (A) Negative    Narrative    C. difficile GDH antigen and C. difficile toxin were detected by enzyme immunoassay. Results must be interpreted based on clinical findings and are supportive of C. difficile infection.   UA with Microscopic reflex to Culture    Specimen: Urine, Catheter   Result Value Ref Range    Color Urine Yellow Colorless, Straw, Light Yellow, Yellow    Appearance Urine Clear Clear    Glucose Urine Negative Negative mg/dL    Bilirubin Urine Negative Negative    Ketones Urine Negative Negative mg/dL    Specific Gravity Urine 1.023 1.003 - 1.035    Blood Urine Small (A) Negative    pH Urine 5.0 5.0 - 7.0    Protein Albumin Urine 50 (A) Negative mg/dL    Urobilinogen Urine Normal Normal, 2.0 mg/dL    Nitrite Urine Negative Negative    Leukocyte Esterase Urine Negative Negative    Mucus Urine Present (A) None Seen /LPF    RBC Urine 14 (H) <=2 /HPF    WBC Urine 2 <=5 /HPF    Squamous Epithelials Urine <1 <=1 /HPF    Narrative    Urine Culture not indicated   CT Head w/o Contrast    Narrative    EXAM: CT HEAD W/O CONTRAST  LOCATION: [facility]  DATE: 11/26/2023    INDICATION: Confusion.  COMPARISON: 10/26/2023  TECHNIQUE: Routine CT Head without IV contrast. Multiplanar reformats. Dose reduction techniques were used.    FINDINGS:  INTRACRANIAL CONTENTS: No intracranial hemorrhage, extraaxial collection, or mass effect.  No CT evidence of acute infarct. Mild presumed chronic small vessel ischemic changes. Mild generalized volume loss. No hydrocephalus.    Note is made of coarse atherosclerotic calcifications of the intracranial vasculature.      VISUALIZED ORBITS/SINUSES/MASTOIDS: Prior bilateral cataract surgery. Visualized portions of the orbits are otherwise unremarkable. Mild mucosal thickening scattered about the paranasal sinuses. No middle ear or mastoid effusion.    BONES/SOFT TISSUES: No acute abnormality.      Impression    IMPRESSION:  1.  No CT evidence for  acute intracranial process.  2.  Brain atrophy and presumed chronic microvascular ischemic changes as above.   CT Chest PE Abdomen Pelvis w Contrast    Narrative    EXAM: CT CHEST PE ABDOMEN PELVIS W CONTRAST  LOCATION: [facility]  DATE: 11/26/2023    INDICATION: Shortness of breath. Hypoxia. Abdominal pain. Fever.  COMPARISON: CT abdomen and pelvis on 09/12/2023 and chest CT on 09/11/2023.  TECHNIQUE: CT chest pulmonary angiogram and routine CT abdomen pelvis with IV contrast. Arterial phase through the chest and venous phase through the abdomen and pelvis. Multiplanar reformats and MIP reconstructions were performed. Dose reduction   techniques were used.   CONTRAST: 74 mL of Isovue-[id number].    FINDINGS:  ANGIOGRAM CHEST: Few scattered small filling defects within the subsegmental pulmonary artery is most prominent in the left lower lobe (series 3 image 175), likely represents small pulmonary emboli. No evidence of right heart strain.    LUNGS AND PLEURA: Postsurgical changes of right lower lobe wedge resection. Small to moderate size right pleural effusion and associated basilar atelectasis/consolidation. No significant left pleural effusion. No significant pneumothorax. Upper lobes   predominant emphysematous changes. Right basilar dense consolidative pulmonary opacities along the wedge resection margin, causes narrowing of the adjacent right lower lobe pulmonary artery (series 5 image 157), worrisome for local recurrence or less   likely infection. Multiple nodular pulmonary opacities in the left lower lobe measure up to 1 cm (series 5 image 178), could be metastatic or infectious.    MEDIASTINUM/AXILLAE: No cardiomegaly or significant pericardial effusion. Multiple prominent but not significantly enlarged mediastinal and hilar lymph nodes, indeterminate, could be reactive.    CORONARY ARTERY CALCIFICATION: Moderate.    HEPATOBILIARY: Multiple subcentimeter hypodense foci in the liver, are  too small to characterize. The gallbladder is unremarkable.    PANCREAS: No main pancreatic ductal dilatation or definite solid pancreatic mass.    SPLEEN: No splenomegaly.    ADRENAL GLANDS: No adrenal nodules.    KIDNEYS/BLADDER: No hydronephrosis in either kidney. Contrast within the renal collecting system, precludes detailed evaluation for kidney stones.    BOWEL: No abnormally dilated bowel loops. Mild distal colonic wall thickening predominantly involving the sigmoid colon and rectum, nonspecific, can be seen with acute proctocolitis.    PERITONEUM: No significant free fluid in the abdomen and pelvis. No free peritoneal or portal venous gas.    LYMPH NODES: No significant abdominopelvic lymphadenopathy.    VASCULATURE: Moderate atherosclerotic vascular calcification of the abdominal aorta and iliac vessels. 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    PELVIC ORGANS: Mildly enlarged heterogenous appearance of the prostate gland, of indeterminate etiology. Mild diffuse urinary bladder wall thickening, nonspecific, can be seen with underdistention versus chronic bladder outlet obstruction versus chronic   cystitis.    MUSCULOSKELETAL: Multilevel degenerative changes of the spine.      Impression    IMPRESSION:  1.  Few scattered small filling defects in the subsegmental pulmonary arteries most prominent of the left lower lobe, likely represents small pulmonary emboli.  2.  Medial right lower lobe patchy consolidative pulmonary opacities, along the right lower lobe wedge resection, worrisome for local recurrence versus less likely infection, can be further evaluated with dedicated PET/CT or tissue sampling.  3.  Multiple pulmonary nodules most prominent in the left lower lobe, slightly increased in size as compared to 09/11/2023 exam, likely metastatic.  4.  Small to moderate right pleural effusion.  5.  Mild distal colonic and rectal wall thickening,  nonspecific, can be seen with acute proctocolitis.  6.  Mildly enlarged heterogenous appearance of the prostate gland, of indeterminate etiology, recommend correlation with serum PSA.   Troponin T, High Sensitivity   Result Value Ref Range    Troponin T, High Sensitivity 59 (H) <=22 ng/L     *Note: Due to a large number of results and/or encounters for the requested time period, some results have not been displayed. A complete set of results can be found in Results Review.             Moderate FDG activity in the left seventh rib anterolaterally is nonspecific and could represent lymphoma/myeloma or sequelae of trauma. Request correlation with any focal tenderness or historjf of injury.  2. Band of FDG activity in the right lower lobe near the wedge resection is more likely inflammation than recuirent tumor,  but continued CT or PET CT christel\'eillance is recommended.  3. 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  Request correlation with direct inspection.  New small LLL pulmonary nodules

## 2023-11-26 NOTE — CONSULTS
[facility]    Infectious Disease Consultation     Date of Admission:  11/25/2023  Date of Consult (When I saw the patient): 11/26/23    Assessment & Plan   Hermilo Velasquez is a 89 year old who was admitted on 11/25/2023.     Assessment:  89 YM with multiple medical conditions, hx of C.diff colitis in July with relapse in August, was to be on prolonged pulse oral vancomycin taper, but discontinued vancomycin as diarrhea improved, and more recently admitted with recurrent diarrhea, leukocytosis and hypotension related to C.diff relapse treated with long taper of oral vancomycin with last date being 11/15/23. Now presenting again with recurrent c.diff and possible pneumonia.      -Recurrent C.diff colitis with diarrhea, fever, and mild leukocytosis  -Possible pneumonia. Mildly hypoxic with fever and leukocytosis with patchy consolidation on CT.   -COPD and right lung adenocarcinoma with questionable recurrence of lung cancer in RLL per imaging this admission   -Small PE this admission, on IV heparin now.  -Atrial fibrillation  -FRED on Chronic kidney disease  -chronic medical conditions- hx of DVT/PE, atrial fibrillation, HTN, BPH, non Hodgkin lymphoma, GERD     Recommendations:  Continue oral Vancomycin for c.diff.   Will need out patient GI evaluation for FMT. This was recommended last admission but does not appear it has not been done yet.   Continue Cefepime and Azithromycin for now for possible pneumonia. Fever and leukocytosis may be all from c.diff. Will want to limit duration of these antibiotics as much as possible to prevent worsening of c.diff.   Follow clinically.   ID will follow.         Evi Anthony PA-C      Reason for Consult   Reason for consult: Asked to evaluate this patient for recurrent c.diff, possible pneumonia.    Primary Care Physician   Karson Bishop    Chief Complaint       History is obtained from the patient and medical records    History of Present Illness    Hermilo Velasquez is a 89 year old male smoker with a history of lung cancer diagnosed in 2019 s/p RLL wedge resection, lymphoplasmacytic lymphoma diagnosed and treated in 2012, COPD (oxygen dependent), atrial fibrillation, aortic valve stenosis s/p bovine AVR, recent recurrent C. difficile (completed long taper of oral vancomycin 11/15) who was admitted with chills, fever, recurrent diarrhea, and hypoxia. His WBC was 11.3. Temperature was 102.4. C.diff has again returned positive. CXR with possible developing pneumonia right base. CT chest with small PE, multiple pulmonary nodules, and medial right lower lobe patchy consolidative opacities. Also noted was mild distal colonic and rectal wall thickening. He was admitted and started on oral vancomycin as well as  Cefepime and Azithromycin. Blood cultures are no growth to date. Strep pneumo antigen is negative as well as RSV, Influenza A/B and COVID PCR.       Past Medical History   I have reviewed this patient's medical history and updated it with pertinent information if needed.   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 Vasques     Aortic stenosis     Severe AS, 9/2015 AVR - [facility] TRIFECTA Bovine bioprosthesis 25MM TF-25A    Atrial fibrillation (H)     9/2015 Paroxysmal post op Afib - discharged on Warfarin and a beta blocker    COPD (chronic obstructive pulmonary disease) (H)     Deep vein thrombosis (H)     GERD (gastroesophageal reflux disease)     Heart murmur     Monoclonal gammopathy     plasmacyte prominent causing monoclonal gammopathy    Need for SBE (subacute bacterial endocarditis) prophylaxis     Neuropathy     Other and unspecified hyperlipidemia     Other malignant lymphomas     non hodgkin's lymphoma    RBBB (right bundle branch block)     Severe sepsis with acute organ dysfunction (H) 11/16/2015    Unspecified hereditary and idiopathic peripheral neuropathy         Past Surgical History   I have reviewed this patient's surgical history and updated it with pertinent information if needed.  Past Surgical History:   Procedure Laterality Date    APPENDECTOMY      ARTHROPLASTY KNEE Right 7/25/2022    Procedure: RIGHT TOTAL KNEE ARTHROPLASTY;  Surgeon: Giuliano Deshpande MD;  Location:  OR    AS TOTAL KNEE ARTHROPLASTY      BACK SURGERY      ESOPHAGOSCOPY, GASTROSCOPY, DUODENOSCOPY (EGD), COMBINED N/A 11/28/2015    Procedure: COMBINED ESOPHAGOSCOPY, GASTROSCOPY, DUODENOSCOPY (EGD);  Surgeon: Danis Castillo MD;  Location:  GI    ESOPHAGOSCOPY, GASTROSCOPY, DUODENOSCOPY (EGD), COMBINED N/A 7/26/2018    Procedure: COMBINED ESOPHAGOSCOPY, GASTROSCOPY, DUODENOSCOPY (EGD);;  Surgeon: Toby Dong DO;  Location:  GI    ESOPHAGOSCOPY, GASTROSCOPY, DUODENOSCOPY (EGD), COMBINED N/A 8/17/2020    Procedure: ESOPHAGOGASTRODUODENOSCOPY (EGD);  Surgeon: Herrera Henry MD;  Location:  GI    ESOPHAGOSCOPY, GASTROSCOPY, DUODENOSCOPY (EGD), COMBINED N/A 10/24/2020    Procedure: ESOPHAGOGASTRODUODENOSCOPY (EGD);  Surgeon: Vick Florentino MD;  Location:  GI    ESOPHAGOSCOPY, GASTROSCOPY, DUODENOSCOPY (EGD), COMBINED N/A 4/11/2022    Procedure: ESOPHAGOGASTRODUODENOSCOPY (EGD);  Surgeon: Vick Florentino MD;  Location:  GI    ESOPHAGOSCOPY, GASTROSCOPY, DUODENOSCOPY (EGD), COMBINED N/A 8/26/2022    Procedure: ESOPHAGOGASTRODUODENOSCOPY (EGD);  Surgeon: El Mcgovern MD;  Location:  GI    HERNIA REPAIR  2006    HERNIORRHAPHY VENTRAL  4/17/2013    Procedure: HERNIORRHAPHY VENTRAL;  VENTRAL HERNIA REPAIR WITH MESH;  Surgeon: Patel Guzman MD;  Location:  OR    KNEE SURGERY      arthroscopic right knee surgery     LOBECTOMY LUNG Right 3/26/2019    POSSIBLE LOBECTOMY LUNG per Dr. [facility]ques at [facility]    REPAIR LIGAMENT ANKLE  2/23/2012    Procedure:REPAIR LIGAMENT ANKLE; LEFT TARSAL TUNNEL RELEASE OF KNOT OF ARIEL WHITNEY; Surgeon:SAUL PUENTE  GREG; Location:[facility] OR    REPLACE VALVE AORTIC N/A 9/3/2015    Procedure: REPLACE VALVE AORTIC;  Surgeon: Antonino Mitchell MD;  Location: [facility] OR    ROTATOR CUFF REPAIR RT/LT      bilateral    SPINE SURGERY      3 spine surgeries    THORACOTOMY, WEDGE RESECTION LUNG, COMBINED Right 3/26/2019    RIGHT THORACOTOMY, WEDGE RESECTION, RIGHT LOWER LOBE LUNG NODULE,;  Surgeon: Jose A Vasques MD;  Location: [facility] OR    TONSILLECTOMY      TURP         Prior to Admission Medications   Prior to Admission Medications   Prescriptions Last Dose Informant Patient Reported? Taking?   acetaminophen (TYLENOL) 500 MG tablet  at PRN Self Yes Yes   Sig: Take 1,000 mg by mouth 3 times daily as needed   albuterol (PROAIR HFA/PROVENTIL HFA/VENTOLIN HFA) 108 (90 Base) MCG/ACT inhaler  at PRN Self Yes Yes   Sig: Inhale 2 puffs into the lungs every 6 hours as needed   albuterol (PROVENTIL) (2.5 MG/3ML) 0.083% neb solution  at PRN Self No Yes   Sig: Take 1 vial (2.5 mg) by nebulization every 6 hours as needed for shortness of breath / dyspnea or wheezing   atorvastatin (LIPITOR) 10 MG tablet 11/25/2023 at AM Self No Yes   Sig: Take 1 tablet (10 mg) by mouth daily   famotidine (PEPCID) 40 MG tablet 11/25/2023 at PM Self No Yes   Sig: TAKE ONE TABLET BY MOUTH TWICE DAILY   ferrous sulfate (FE TABS) 325 (65 Fe) MG EC tablet 11/25/2023 at PM Self Yes Yes   Sig: Take 1 tablet (325 mg) by mouth 2 times daily   fludrocortisone (FLORINEF) 0.1 MG tablet 11/25/2023 at AM Self No Yes   Sig: Take 1 tablet (0.1 mg) by mouth daily   fluticasone-vilanterol (BREO ELLIPTA) 200-25 MCG/ACT inhaler Not Taking Self Yes No   Sig: Inhale 1 puff into the lungs daily   Patient not taking: Reported on 11/26/2023   gabapentin (NEURONTIN) 300 MG capsule 11/25/2023 at PM Self No Yes   Sig: Take 2 capsules (600 mg) by mouth At Bedtime For neuropathy pain   tiotropium (SPIRIVA) 18 MCG inhaled capsule 11/25/2023 at AM Self Yes Yes   Sig: Inhale 18 mcg into the  "lungs daily      Facility-Administered Medications: None     Allergies   Allergies   Allergen Reactions    Omeprazole Itching    Pantoprazole Itching    Prevacid [Lansoprazole] Itching    Lasix [Furosemide] Rash    Lidocaine Blisters and Rash     Allergy to lidocaine ointment  Allergy to lidocaine ointment      Penicillin G Rash    Penicillins Rash     \"broke out from injection\" 60 yrs ago  Tolerates cephalosporins       Immunization History   Immunization History   Administered Date(s) Administered    COVID-19 12+ (2023-24) (Pfizer) 10/19/2023    COVID-19 Bivalent 12+ (Pfizer) 11/23/2022, 06/20/2023    COVID-19 MONOVALENT 12+ (Pfizer) 01/20/2021, 02/10/2021, 10/15/2021    COVID-19 Monovalent 12+ (Pfizer 2022) 04/26/2022    DT (PEDS <7y) 07/25/1996    Influenza (H1N1) 01/20/2010    Influenza (High Dose) 3 valent vaccine 11/03/2014, 10/01/2015, 10/01/2016, 11/08/2016, 11/02/2017, 12/10/2018, 10/11/2019    Influenza (IIV3) PF 09/23/2009    Influenza Vaccine 65+ (Fluzone HD) 10/15/2020, 09/28/2021, 11/23/2022, 09/07/2023    Pneumo Conj 13-V (2010&after) 11/08/2014    Pneumococcal 23 valent 07/01/2014    Td (Adult), Adsorbed 06/01/2012    Zoster recombinant adjuvanted (SHINGRIX) 12/10/2018, 07/21/2019    Zoster vaccine, live 07/01/2014, 07/01/2019       Social History   I have reviewed this patient's social history and updated it with pertinent information if needed. Hermilo Velasquez  reports that he quit smoking about 25 years ago. His smoking use included cigarettes. He has a 110.00 pack-year smoking history. He has never used smokeless tobacco. He reports that he does not currently use alcohol. He reports that he does not use drugs.    Family History   I have reviewed this patient's family history and updated it with pertinent information if needed.   Family History   Problem Relation Age of Onset    C.A.D. Father     Emphysema Father     Melanoma No family hx of     Skin Cancer No family hx of        Review of " Systems   The 10 point Review of Systems is negative    Physical Exam   Temp: 98  F (36.7  C) Temp src: Oral BP: 119/63 Pulse: 84   Resp: 20 SpO2: 95 % O2 Device: Nasal cannula Oxygen Delivery: 1 LPM  Vital Signs with Ranges  Temp:  [98  F (36.7  C)-102.4  F (39.1  C)] 98  F (36.7  C)  Pulse:  [] 84  Resp:  [13-28] 20  BP: ()/(58-95) 119/63  SpO2:  [92 %-100 %] 95 %  0 lbs 0 oz  There is no height or weight on file to calculate BMI.    GENERAL APPEARANCE:  awake  EYES: Eyes grossly normal to inspection  NECK: no adenopathy  RESP: lungs coarse on bottom.  CV: regular rates and rhythm  ABDOMEN: soft, nontender + BS  MS: extremities normal  SKIN: no suspicious lesions or rashes        Data   All laboratory data reviewed    Component      Latest Ref [facility] 11/25/2023  11:02 PM   WBC      4.0 - 11.0 10e3/uL 11.3 (H)    RBC Count      4.40 - 5.90 10e6/uL 4.61    Hemoglobin      13.3 - 17.7 g/dL 12.0 (L)    Hematocrit      40.0 - 53.0 % 38.9 (L)    MCV      78 - 100 fL 84    MCH      26.5 - 33.0 pg 26.0 (L)    MCHC      31.5 - 36.5 g/dL 30.8 (L)    RDW      10.0 - 15.0 % 15.0    Platelet Count      150 - 450 10e3/uL 150    % Neutrophils      % 86    % Lymphocytes      % 4    % Monocytes      % 7    % Eosinophils      % 3    % Basophils      % 0    % Immature Granulocytes      % 0    NRBCs per 100 WBC      <1 /100 0    Absolute Neutrophils      1.6 - 8.3 10e3/uL 9.8 (H)    Absolute Lymphocytes      0.8 - 5.3 10e3/uL 0.4 (L)    Absolute Monocytes      0.0 - 1.3 10e3/uL 0.8    Absolute Eosinophils      0.0 - 0.7 10e3/uL 0.3    Absolute Basophils      0.0 - 0.2 10e3/uL 0.0    Absolute Immature Granulocytes      <=0.4 10e3/uL 0.0    Absolute NRBCs      10e3/uL 0.0        Component      Latest Ref [facility] 11/25/2023  11:01 PM   Sodium      135 - 145 mmol/L 137    Potassium      3.4 - 5.3 mmol/L 3.9    Carbon Dioxide (CO2)      22 - 29 mmol/L 28    Anion Gap      7 - 15 mmol/L 8    Urea Nitrogen      8.0 - 23.0 mg/dL 18.8     Creatinine      0.67 - 1.17 mg/dL 1.36 (H)    GFR Estimate      >60 mL/min/1.73m2 49 (L)    Calcium      8.2 - 9.6 mg/dL 8.8    Chloride      98 - 107 mmol/L 101    Glucose      70 - 99 mg/dL 160 (H)    Alkaline Phosphatase      40 - 150 U/L 126    AST      0 - 45 U/L 12    ALT      0 - 70 U/L 7    Protein Total      6.4 - 8.3 g/dL 6.9    Albumin      3.5 - 5.2 g/dL 3.6    Bilirubin Total      <=1.2 mg/dL 0.6         Component      Latest Ref Rng 11/25/2023  11:01 PM   Sodium      135 - 145 mmol/L 137    Potassium      3.4 - 5.3 mmol/L 3.9    Carbon Dioxide (CO2)      22 - 29 mmol/L 28    Anion Gap      7 - 15 mmol/L 8    Urea Nitrogen      8.0 - 23.0 mg/dL 18.8    Creatinine      0.67 - 1.17 mg/dL 1.36 (H)    GFR Estimate      >60 mL/min/1.73m2 49 (L)    Calcium      8.2 - 9.6 mg/dL 8.8    Chloride      98 - 107 mmol/L 101    Glucose      70 - 99 mg/dL 160 (H)    Alkaline Phosphatase      40 - 150 U/L 126    AST      0 - 45 U/L 12    ALT      0 - 70 U/L 7    Protein Total      6.4 - 8.3 g/dL 6.9    Albumin      3.5 - 5.2 g/dL 3.6    Bilirubin Total      <=1.2 mg/dL 0.6           Collected Updated Procedure Result Status    11/26/2023 1329 11/26/2023 1357 Enteric Bacteria and Virus Panel PCR [[id number]]   Stool from Per Rectum    In process Component Value   No component results          11/26/2023 0318 11/26/2023 0324 Blood Culture Peripheral Blood [[id number]]   Peripheral Blood    In process Component Value   No component results             11/26/2023 0047 11/26/2023 1130 Strep pneumo Agn Ur greater or equal to 13yrs or CSF any age [[id number]]   Urine, Catheter    In process Component Value   No component results          11/26/2023 0047 11/26/2023 1130 Legionella pneumophila antigen urine [[id number]]   Urine, Catheter    In process Component Value   No component results          11/25/2023 2330 11/26/2023 0423 C. difficile Toxin B PCR with reflex to C. difficile Antigen and Toxins A/B EIA  [[id number]]    (Abnormal)   Stool from Per Rectum    Final result Component Value   C Difficile Toxin B by PCR Positive Abnormal    Detection of C. difficile nucleic acid in stools confirms the presence of these organisms in diarrheal patients but may not indicate that C. difficile is the etiologic agent of the diarrhea. Results from the Xpert C. difficile assay should be interpreted in conjunction with other laboratory and clinical data available to the clinician.    Patients with a positive C. difficile PCR result will receive reflex GDH/Toxin Immunoassay testing. Please interpret the PCR test result in conjunction with GDH/Toxin Immunoassay results and the clinical status of patient.          11/25/2023 2330 11/26/2023 0516 C. difficile Antigen and Toxins A/B by Enzyme Immunoassay [[id number]]    (Abnormal)   Stool from Per Rectum    Final result Component Value   C. difficile GDH Antigen Positive Abnormal    C. difficile Toxin Positive Abnormal           11/25/2023 2302 11/25/2023 2351 Symptomatic Influenza A/B, RSV, & SARS-CoV2 PCR (COVID-19) Nasopharyngeal [[id number]]    Swab from Nasopharyngeal    Final result Component Value   Influenza A PCR Negative   Influenza B PCR Negative   RSV PCR Negative   SARS CoV2 PCR Negative   NEGATIVE: SARS-CoV-2 (COVID-19) RNA not detected, presumed negative.          11/25/2023 2301 11/25/2023 2309 Blood Culture Peripheral Blood [[id number]]   Peripheral Blood    In process Component Value   No component results            EXAM: CT CHEST PE ABDOMEN PELVIS W CONTRAST  LOCATION: [facility]  DATE: 11/26/2023     INDICATION: Shortness of breath. Hypoxia. Abdominal pain. Fever.  COMPARISON: CT abdomen and pelvis on 09/12/2023 and chest CT on 09/11/2023.  TECHNIQUE: CT chest pulmonary angiogram and routine CT abdomen pelvis with IV contrast. Arterial phase through the chest and venous phase through the abdomen and pelvis. Multiplanar reformats  and MIP reconstructions were performed. Dose reduction   techniques were used.   CONTRAST: 74 mL of Isovue-370.     FINDINGS:  ANGIOGRAM CHEST: Few scattered small filling defects within the subsegmental pulmonary artery is most prominent in the left lower lobe (series 3 image 175), likely represents small pulmonary emboli. No evidence of right heart strain.     LUNGS AND PLEURA: Postsurgical changes of right lower lobe wedge resection. Small to moderate size right pleural effusion and associated basilar atelectasis/consolidation. No significant left pleural effusion. No significant pneumothorax. Upper lobes   predominant emphysematous changes. Right basilar dense consolidative pulmonary opacities along the wedge resection margin, causes narrowing of the adjacent right lower lobe pulmonary artery (series 5 image 157), worrisome for local recurrence or less   likely infection. Multiple nodular pulmonary opacities in the left lower lobe measure up to 1 cm (series 5 image 178), could be metastatic or infectious.     MEDIASTINUM/AXILLAE: No cardiomegaly or significant pericardial effusion. Multiple prominent but not significantly enlarged mediastinal and hilar lymph nodes, indeterminate, could be reactive.     CORONARY ARTERY CALCIFICATION: Moderate.     HEPATOBILIARY: Multiple subcentimeter hypodense foci in the liver, are too small to characterize. The gallbladder is unremarkable.     PANCREAS: No main pancreatic ductal dilatation or definite solid pancreatic mass.     SPLEEN: No splenomegaly.     ADRENAL GLANDS: No adrenal nodules.     KIDNEYS/BLADDER: No hydronephrosis in either kidney. Contrast within the renal collecting system, precludes detailed evaluation for kidney stones.     BOWEL: No abnormally dilated bowel loops. Mild distal colonic wall thickening predominantly involving the sigmoid colon and rectum, nonspecific, can be seen with acute proctocolitis.     PERITONEUM: No significant free fluid in the  abdomen and pelvis. No free peritoneal or portal venous gas.     LYMPH NODES: No significant abdominopelvic lymphadenopathy.     VASCULATURE: Moderate atherosclerotic vascular calcification of the abdominal aorta and iliac vessels. 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     PELVIC ORGANS: Mildly enlarged heterogenous appearance of the prostate gland, of indeterminate etiology. Mild diffuse urinary bladder wall thickening, nonspecific, can be seen with underdistention versus chronic bladder outlet obstruction versus chronic   cystitis.     MUSCULOSKELETAL: Multilevel degenerative changes of the spine.                                                                      IMPRESSION:  1.  Few scattered small filling defects in the subsegmental pulmonary arteries most prominent of the left lower lobe, likely represents small pulmonary emboli.  2.  Medial right lower lobe patchy consolidative pulmonary opacities, along the right lower lobe wedge resection, worrisome for local recurrence versus less likely infection, can be further evaluated with dedicated PET/CT or tissue sampling.  3.  Multiple pulmonary nodules most prominent in the left lower lobe, slightly increased in size as compared to 09/11/2023 exam, likely metastatic.  4.  Small to moderate right pleural effusion.  5.  Mild distal colonic and rectal wall thickening, nonspecific, can be seen with acute proctocolitis.  6.  Mildly enlarged heterogenous appearance of the prostate gland, of indeterminate etiology, recommend correlation with serum PSA.        EXAM: CT HEAD W/O CONTRAST  LOCATION: [facility]  DATE: 11/26/2023     INDICATION: Confusion.  COMPARISON: 10/26/2023  TECHNIQUE: Routine CT Head without IV contrast. Multiplanar reformats. Dose reduction techniques were used.     FINDINGS:  INTRACRANIAL CONTENTS: No intracranial hemorrhage, extraaxial collection, or mass effect.  No  CT evidence of acute infarct. Mild presumed chronic small vessel ischemic changes. Mild generalized volume loss. No hydrocephalus.     Note is made of coarse atherosclerotic calcifications of the intracranial vasculature.      VISUALIZED ORBITS/SINUSES/MASTOIDS: Prior bilateral cataract surgery. Visualized portions of the orbits are otherwise unremarkable. Mild mucosal thickening scattered about the paranasal sinuses. No middle ear or mastoid effusion.     BONES/SOFT TISSUES: No acute abnormality.                                                                      IMPRESSION:  1.  No CT evidence for acute intracranial process.  2.  Brain atrophy and presumed chronic microvascular ischemic changes as above.      EXAM: XR CHEST 2 VIEWS  LOCATION: [facility]  DATE: 11/25/2023     INDICATION: Fever  COMPARISON: 09/12/2023                                                                      IMPRESSION: Borderline cardiomegaly. Previous cardiac valvular surgery. Fullness of central pulmonary vessels similar to previous exam. Mild diffuse interstitial prominence stable but questioning slight more focal right infrahilar infiltrate and possible   developing pneumonia right base.

## 2023-11-26 NOTE — ED PROVIDER NOTES
History     Chief Complaint:  Shortness of Breath     HPI   Hermilo Velasquez is a 89 year old male who presents in the ED today due to shortness of breath. EMS reports that when they arrived the patient was incontinent in urine. They also report that the patient had low levels of oxygen and a fever of 104. They were able to get his oxygen to 90 with nasal cannula.    Independent Historian:    EMS  I spoke to the wife who reports that he seemed more tired today.  They noted that his oxygen level was 79% on room air so he was put on oxygen.  She reports that he had a loose stool    Review of External Notes:  Reviewed hospitalization from 9/2023     Medications:    Lipitor  Pepcid   Cymbalta  Florinef  Neurontin   Spiriva  Flomax    Past Medical History:    Adenocarcinoma right lung   Aortic stenosis   DVT  COPD  Atrial fibrillation   Other unspecified hyperlipidemia   Other malignant hyperlipidemia   Neuropathy   Monoclonal gammopathy   Unspecified hereditary and idiopathic peripheral neuropathy   RBBB  Heart murmur  Atrial fibrillation   CKD   FRED  Anemia   Microscopic hematuria   Gastric AVMs  COPD  C difficile colitis  Dehydration  Severe sepsis   Altered mental status   Severe sepsis   Acute kidney injury   Moderate protein calorie malnutrition   AAA  Colitis   General weakness  Chronic respiratory failure with hypoxia   Anemia   Pneumonia of lower left lobe  [place]   Encephalopathy   Hemarthrosis   Acute on chronic diastolic congestive heart failure     Past Surgical History:    Appendectomy   Arthroplasty knee  Back surgery   Esophagoscopy   Gastroscopy   Duodenoscopy   Rotator cuff repair bilateral   Hernia repair   Tonsillectomy   Spine surgery  TURP  Knee surgery   Lobectomy lung   Herniorrhaphy ventral       Physical Exam   Patient Vitals for the past 24 hrs:   BP Temp Temp src Pulse Resp SpO2   11/26/23 0601 115/61 -- -- 94 22 97 %   11/26/23 0531 139/67 -- -- 92 22 99 %   11/26/23 0530 139/67 -- --  92 21 98 %   11/26/23 0325 137/66 -- -- 92 22 98 %   11/26/23 0255 117/66 -- -- 93 21 97 %   11/26/23 0154 127/68 -- -- 88 23 100 %   11/26/23 0139 -- -- -- -- -- 100 %   11/26/23 0124 127/65 -- -- 90 13 --   11/26/23 0054 93/58 -- -- 96 28 92 %   11/26/23 0005 120/75 -- -- 107 23 97 %   11/25/23 2350 (!) 115/95 -- -- 109 24 --   11/25/23 2232 (!) 156/93 -- -- -- -- --   11/25/23 2231 -- (!) 102.4  F (39.1  C) Oral (!) 129 20 98 %        Physical Exam  General: Sitting up in bed  Eyes:  The pupils are equal and round    Conjunctivae and sclerae are normal  ENT:    Atraumatic face  Neck:  Normal range of motion  CV:  Regular rate, regular rhythm     Skin warm and well perfused   Resp:  Non labored breathing on room air    No tachypnea    No cough heard, diminished breath sounds bilaterally    On oxygen via nasal cannula  GI:  Abdomen is soft, there is no rigidity    No distension    No rebound tenderness     Mild diffuse tenderness  MS:  Normal muscular tone  Skin:  No rash or acute skin lesions noted  Neuro:   Awake, alert.  Mildly confused    Speech is normal and fluent.    Face is symmetric.     Moves all extremities equally    SILT on bilaterally UE/LE  Psych: Normal affect.  Appropriate interactions.    Emergency Department Course   ECG 1  ECG taken at 2315, ECG read at 2335  Atrial fibrillation with rapid ventricular response  Right bundle branch block  Abnormal ECG   When compared with prior ECG, dated 09/12/23,   Rate 104 bpm. AR interval * ms. QRS duration 136 ms. QT/QTc 318/418 ms. P-R-T axes * -29 73.    ECG 2  ECG taken at 1736, ECG read at 1738  Wide QRS tachycardia   Right bundle branch block   Abnormal ECG    When compared with prior ECG, dated 08/17/23,   Rate 128 bpm. AR interval * ms. QRS duration 134 ms. QT/QTc 350/511 ms. P-R-T axes * -25 63.  Imaging:  CT Chest PE Abdomen Pelvis w Contrast   Final Result   IMPRESSION:   1.  Few scattered small filling defects in the subsegmental pulmonary  arteries most prominent of the left lower lobe, likely represents small pulmonary emboli.   2.  Medial right lower lobe patchy consolidative pulmonary opacities, along the right lower lobe wedge resection, worrisome for local recurrence versus less likely infection, can be further evaluated with dedicated PET/CT or tissue sampling.   3.  Multiple pulmonary nodules most prominent in the left lower lobe, slightly increased in size as compared to 09/11/2023 exam, likely metastatic.   4.  Small to moderate right pleural effusion.   5.  Mild distal colonic and rectal wall thickening, nonspecific, can be seen with acute proctocolitis.   6.  Mildly enlarged heterogenous appearance of the prostate gland, of indeterminate etiology, recommend correlation with serum PSA.      CT Head w/o Contrast   Final Result   IMPRESSION:   1.  No CT evidence for acute intracranial process.   2.  Brain atrophy and presumed chronic microvascular ischemic changes as above.      XR Chest 2 Views   Final Result   IMPRESSION: Borderline cardiomegaly. Previous cardiac valvular surgery. Fullness of central pulmonary vessels similar to previous exam. Mild diffuse interstitial prominence stable but questioning slight more focal right infrahilar infiltrate and possible    developing pneumonia right base.        Report per radiology    Laboratory:  Labs Ordered and Resulted from Time of ED Arrival to Time of ED Departure   COMPREHENSIVE METABOLIC PANEL - Abnormal       Result Value    Sodium 137      Potassium 3.9      Carbon Dioxide (CO2) 28      Anion Gap 8      Urea Nitrogen 18.8      Creatinine 1.36 (*)     GFR Estimate 49 (*)     Calcium 8.8      Chloride 101      Glucose 160 (*)     Alkaline Phosphatase 126      AST 12      ALT 7      Protein Total 6.9      Albumin 3.6      Bilirubin Total 0.6     TROPONIN T, HIGH SENSITIVITY - Abnormal    Troponin T, High Sensitivity 62 (*)    CBC WITH PLATELETS AND DIFFERENTIAL - Abnormal    WBC Count 11.3 (*)      RBC Count 4.61      Hemoglobin 12.0 (*)     Hematocrit 38.9 (*)     MCV 84      MCH 26.0 (*)     MCHC 30.8 (*)     RDW 15.0      Platelet Count 150      % Neutrophils 86      % Lymphocytes 4      % Monocytes 7      % Eosinophils 3      % Basophils 0      % Immature Granulocytes 0      NRBCs per 100 WBC 0      Absolute Neutrophils 9.8 (*)     Absolute Lymphocytes 0.4 (*)     Absolute Monocytes 0.8      Absolute Eosinophils 0.3      Absolute Basophils 0.0      Absolute Immature Granulocytes 0.0      Absolute NRBCs 0.0     ISTAT GASES LACTATE VENOUS POCT - Abnormal    Lactic Acid POCT 0.8      Bicarbonate Venous POCT 30 (*)     O2 Sat, Venous POCT 23 (*)     pCO2 Venous POCT 60 (*)     pH Venous POCT 7.30 (*)     pO2 Venous POCT 19 (*)    LIPASE - Normal    Lipase 16     INFLUENZA A/B, RSV, & SARS-COV2 PCR   ROUTINE UA WITH MICROSCOPIC REFLEX TO CULTURE   BLOOD CULTURE   BLOOD CULTURE   C. DIFFICILE TOXIN B PCR WITH REFLEX TO C. DIFFICILE ANTIGEN AND TOXINS A/B EIA     Emergency Department Course & Assessments:    Interventions:  Vancomycin po   Cefepime   Azithromycin    Assessments:  2230 I obtained history and examined the patient as noted above.      Independent Interpretation (X-rays, CTs, rhythm strip):  I independently reviewed chest xray -possible pneumonia seen on right side    Consultations/Discussion of Management or Tests:  Was discussed with Dr. Bhatti for admission>        Disposition:  The patient was admitted to the hospital under the care of Dr. Bhatti.     Impression & Plan      Medical Decision Making:  Hermilo Velasquez is a 89-year-old male who presented to the emergency department shortness of breath.  Patient was hypoxic at home.  On oxygen here in the emergency department.  His work of breathing does not appear significant.  Chest x-ray shows possible pneumonia.  He was started on antibiotics for pneumonia.  pCO2 was mildly elevated though I do not think he needs bipap as mentation seems okay  and normally is usually low 50s for PCo2.  He had a loose stool here in the emergency department so do C. difficile sent.  Given his history of C. difficile, started on oral vancomycin as well which can be changed to either prophylactic dose or treatment dose based on testing.  CT done and findings of possible pneumonia versus recurrent malignancy.  Does have findings of PE so started on heparin.  Has had no recent bleeding.  Findings of possible proctocolitis on CT.  CT head with no acute findings.  He has a nonfocal exam and stroke seems less likely.  Discussed patient with hospitalist for admission.    Diagnosis:    ICD-10-CM    1. Pneumonia due to infectious organism, unspecified laterality, unspecified part of lung  J18.9       2. Other pulmonary embolism without acute cor pulmonale, unspecified chronicity (H)  I26.99       3. Fever, unspecified fever cause  R50.9       4. Diarrhea, unspecified type  R19.7          Scribe Disclosure:    I, Ricardo Sykes, am serving as a scribe at 11:16 PM on 11/25/2023 to document services personally performed by Mei Krishnamurthy MD based on my observations and the provider's statements to me.    11/25/2023   Mei Krishnamurthy MD Goertz, Maria Kristine, MD  11/26/23 0716

## 2023-11-26 NOTE — PROGRESS NOTES
Arrived from Emergency room.  A&O, forgetful, able to make needs known.   Oriented to room/unit.

## 2023-11-26 NOTE — H&P
[facility]    History and Physical - Hospitalist Service       Date of Admission:  11/25/2023    Assessment & Plan      Hermilo Velasquez is a 89 year old male admitted on 11/25/2023. He presents to the emergency department for fever at home as well as apparently diarrhea.  Found to have left lower lobe pulmonary emboli, proctocolitis, concern for local recurrence and metastatic nodules from prior adenocarcinoma resection.    Left lower lobe pulmonary emboli: Patient noted to have a few scattered small filling defects in the subsegmental pulmonary arteries concerning for pulmonary emboli  -Heparin infusion for now  -Will defer any hypercoagulable testing to oncology team.  With his history of Waldenström's macroglobulinemia he could potentially have an acquired coagulopathy.  -Blood cultures x 2; with fever of 104 and prior bioprosthetic aortic valve, consider septic emboli as well as an explanation for this finding    Sepsis secondary to proctocolitis versus possible pneumonia: Patient with CT findings demonstrating primarily right lower lobe pulmonary opacities, but per radiology, these are more concerning for local recurrence of adenocarcinoma than infection.  Procalcitonin low.  Does have diarrhea and findings of proctocolitis as well.  Fever of 104 at home, remains febrile here, heart rate in the 120s range, tachypnea, acute respiratory failure with hypoxia with oxygen saturations in the mid 80s on room air.  -Blood cultures x 2 pending  -C. difficile and enteric panel pending  -Low threshold for pulmonary consultation and consideration of bronchoscopy pending clinical course  -IV cefepime and azithromycin for now given frailty and sepsis.    Waxing and waning encephalopathy: Most consistent with delirium.  Reports the month as December, unaware of situation.  Oriented to self.  Waxing and waning occurs over the course of our interview even.  He is able to describe having 3 loose  bowel movements today and frequent diarrhea at home, also able to confirm CODE STATUS and reasoning.  Potentially septic encephalopathy.  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  At last PCP visit, he was down to 1 bowel movement per day, but now is having increased frequency of diarrhea.  -C. difficile sent in the emergency department; has a history of C. difficile, but was negative in September 2023 for GDH antigen and toxin, so may be meaningful if positive now.    -I have added an enteric panel  -Continue enteric precautions for now  -While awaiting studies, 4 times daily oral vancomycin.  -At last admission, recommended for outpatient GI follow-up for fecal transplant evaluation.  I do not believe this was completed.    Adenocarcinoma of the lung: Wedge resection of right lower lobe March 2019.  Now with nodularities adjacent to surgical sutures as well as other pulmonary nodules which are concerning for metastatic disease.  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  IgM levels normal in September 2023  -[place] [facility] consulted as above.  Will defer repeat IgM testing to oncology team, but seems less likely that patient has had any significant change in this despite new finding of pulmonary emboli    Prostatic hypertrophy:  -PSA added on  -Continue tamsulosin    COPD:  Tobacco use disorder: Reports that he still smokes 3  cigarettes/day.  -Continue Spiriva, Breo Ellipta, as needed albuterol nebulizer treatments.  -Note on at bedtime oxygen at 2 L            Diet:  Advance diet as tolerated  DVT Prophylaxis: Heparin infusion  Suazo Catheter: Not present  Lines: None     Cardiac Monitoring: None  Code Status:  Full code.  Confirmed with patient on admission.  Consistent with prior CODE STATUS documentation.  Recommend ongoing discussion regarding this, and I did introduce the topic of potential CODE STATUS change given his frailty, advanced age, repeated hospitalizations, and acute illness.    Clinically Significant Risk Factors Present on Admission                  # Hypertension: Noted on problem list  # Chronic heart failure with preserved ejection fraction: heart failure noted on problem list and last echo with EF >50%         # COPD: noted on problem list        Disposition Plan      Expected Discharge Date: 11/28/2023                  Raj Bhatti MD  Hospitalist Service  [facility]  Securely message with [employer] (more info)  Text page via [facility] Paging/Directory     ______________________________________________________________________    Chief Complaint   Fever at home.  Patient actually without complaint at this time in the setting of delirium    History is obtained from the patient, chart review, discussion with Dr. Krishnamurthy in the emergency department.  Patient's ability to provide a history is extremely limited as he has waxing and waning encephalopathy consistent with delirium.    History of Present Illness   Hermilo Velasquez is a 89 year old male who was brought to the emergency department for fever of 104 at home, some increased confusion.      He has a history of aortic stenosis with bioprosthetic aortic valve replacement, COPD on 2 L nasal cannula oxygen at home at night, history of lymphoplasmacytic lymphoma, history of right lung adenocarcinoma status post wedge resection in 2022 and  since some concern for local recurrence.  He has recurrent C. difficile with several hospitalizations related to this.  Has been recommended for outpatient fecal transplant evaluation in the past, but I do not believe this was ever completed.    Hospitalized in August with C. difficile recurrence, hospitalized again in September with hypotension and diarrhea.    Today, patient was apparently more tired, was found to be short of breath with oxygen saturations of 79% at home and incontinence of urine as well as some loose stools.  Had a fever as high as 104.  EMS contacted and transported patient to the emergency department where he underwent extensive imaging with CT of his chest, abdomen, pelvis, and CT head.  Found to have what appears to be some left lower lobe pulmonary emboli as well as concern for recurrence of right-sided adenocarcinoma, thought to be less likely infection on CT.  There was some concern for this at last admission as well for which patient actually underwent PET scan through [place] [facility] this past month.  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  Plan through oncology was for again repeat following follow-up imaging, which she would have been due for soon.    Patient can provide no narrative history other than to tell me that he had at least 3 loose stools today.  He was oriented to self, but not situation and cannot recall why he was brought to the emergency department.  He initially cannot tell me the year, later tells me it is 2023.  Appears to have some active waxing and waning of his delirium during interview.  Was able to confirm his CODE STATUS and explained this to me.  Tells me he has no pain.  Told me the month was December, and when I told him it was November, he told me there are only several days left in the month.    He does not feel short of breath currently.  Saturating normally with  supplemental oxygen.      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 Vasques     Aortic stenosis     Severe AS, 9/2015 AVR - [facility] TRIFECTA Bovine bioprosthesis 25MM TF-25A    Atrial fibrillation (H)     9/2015 Paroxysmal post op Afib - discharged on Warfarin and a beta blocker    COPD (chronic obstructive pulmonary disease) (H)     Deep vein thrombosis (H)     GERD (gastroesophageal reflux disease)     Heart murmur     Monoclonal gammopathy     plasmacyte prominent causing monoclonal gammopathy    Need for SBE (subacute bacterial endocarditis) prophylaxis     Neuropathy     Other and unspecified hyperlipidemia     Other malignant lymphomas     non hodgkin's lymphoma    RBBB (right bundle branch block)     Severe sepsis with acute organ dysfunction (H) 11/16/2015    Unspecified hereditary and idiopathic peripheral neuropathy        Past Surgical History   Past Surgical History:   Procedure Laterality Date    APPENDECTOMY      ARTHROPLASTY KNEE Right 7/25/2022    Procedure: RIGHT TOTAL KNEE ARTHROPLASTY;  Surgeon: Giuliano Deshpande MD;  Location:  OR    AS TOTAL KNEE ARTHROPLASTY      BACK SURGERY      ESOPHAGOSCOPY, GASTROSCOPY, DUODENOSCOPY (EGD), COMBINED N/A 11/28/2015    Procedure: COMBINED ESOPHAGOSCOPY, GASTROSCOPY, DUODENOSCOPY (EGD);  Surgeon: Danis Castillo MD;  Location:  GI    ESOPHAGOSCOPY, GASTROSCOPY, DUODENOSCOPY (EGD), COMBINED N/A 7/26/2018    Procedure: COMBINED ESOPHAGOSCOPY, GASTROSCOPY, DUODENOSCOPY (EGD);;  Surgeon: Toby Dong DO;  Location:  GI    ESOPHAGOSCOPY, GASTROSCOPY, DUODENOSCOPY (EGD), COMBINED N/A 8/17/2020    Procedure: ESOPHAGOGASTRODUODENOSCOPY (EGD);  Surgeon: Herrera Henry MD;  Location:  GI    ESOPHAGOSCOPY, GASTROSCOPY, DUODENOSCOPY (EGD), COMBINED N/A 10/24/2020    Procedure: ESOPHAGOGASTRODUODENOSCOPY (EGD);  Surgeon: Vick Florentino MD;   Location:  GI    ESOPHAGOSCOPY, GASTROSCOPY, DUODENOSCOPY (EGD), COMBINED N/A 4/11/2022    Procedure: ESOPHAGOGASTRODUODENOSCOPY (EGD);  Surgeon: Vick Florentino MD;  Location:  GI    ESOPHAGOSCOPY, GASTROSCOPY, DUODENOSCOPY (EGD), COMBINED N/A 8/26/2022    Procedure: ESOPHAGOGASTRODUODENOSCOPY (EGD);  Surgeon: El Mcgovern MD;  Location:  GI    HERNIA REPAIR  2006    HERNIORRHAPHY VENTRAL  4/17/2013    Procedure: HERNIORRHAPHY VENTRAL;  VENTRAL HERNIA REPAIR WITH MESH;  Surgeon: Patel Guzman MD;  Location:  OR    KNEE SURGERY      arthroscopic right knee surgery     LOBECTOMY LUNG Right 3/26/2019    POSSIBLE LOBECTOMY LUNG per Dr. [facility]ques at [facility]    REPAIR LIGAMENT ANKLE  2/23/2012    Procedure:REPAIR LIGAMENT ANKLE; LEFT TARSAL TUNNEL RELEASE OF KNOT OF ARIEL RELEASE; Surgeon:SAUL PUENTE; Location: OR    REPLACE VALVE AORTIC N/A 9/3/2015    Procedure: REPLACE VALVE AORTIC;  Surgeon: Antonino Mitchell MD;  Location:  OR    ROTATOR CUFF REPAIR RT/LT      bilateral    SPINE SURGERY      3 spine surgeries    THORACOTOMY, WEDGE RESECTION LUNG, COMBINED Right 3/26/2019    RIGHT THORACOTOMY, WEDGE RESECTION, RIGHT LOWER LOBE LUNG NODULE,;  Surgeon: Jose A Vasques MD;  Location:  OR    TONSILLECTOMY      TURP         Prior to Admission Medications   Prior to Admission Medications   Prescriptions Last Dose Informant Patient Reported? Taking?   DULoxetine (CYMBALTA) 60 MG capsule  Self Yes No   Sig: Take 60 mg by mouth daily   acetaminophen (TYLENOL) 500 MG tablet  Self Yes No   Sig: Take 1,000 mg by mouth 3 times daily as needed   albuterol (PROAIR HFA/PROVENTIL HFA/VENTOLIN HFA) 108 (90 Base) MCG/ACT inhaler  Self Yes No   Sig: Inhale 2 puffs into the lungs every 6 hours as needed   albuterol (PROVENTIL) (2.5 MG/3ML) 0.083% neb solution  Self No No   Sig: Take 1 vial (2.5 mg) by nebulization every 6 hours as needed for shortness of breath / dyspnea or  wheezing   atorvastatin (LIPITOR) 10 MG tablet  Self No No   Sig: Take 1 tablet (10 mg) by mouth daily   famotidine (PEPCID) 40 MG tablet   No No   Sig: TAKE ONE TABLET BY MOUTH TWICE DAILY   ferrous sulfate (FE TABS) 325 (65 Fe) MG EC tablet  Self Yes No   Sig: Take 1 tablet (325 mg) by mouth 2 times daily   fludrocortisone (FLORINEF) 0.1 MG tablet   No No   Sig: Take 1 tablet (0.1 mg) by mouth daily   fluticasone-vilanterol (BREO ELLIPTA) 200-25 MCG/ACT inhaler  Self Yes No   Sig: Inhale 1 puff into the lungs daily   gabapentin (NEURONTIN) 300 MG capsule   No No   Sig: Take 2 capsules (600 mg) by mouth At Bedtime For neuropathy pain   tamsulosin (FLOMAX) 0.4 MG capsule   Yes No   Sig: Take 0.4 mg by mouth daily   tiotropium (SPIRIVA) 18 MCG inhaled capsule  Self Yes No   Sig: Inhale 18 mcg into the lungs daily      Facility-Administered Medications: None           Physical Exam   Vital Signs: Temp: (!) 102.4  F (39.1  C) Temp src: Oral BP: 137/66 Pulse: 92   Resp: 22 SpO2: 98 % O2 Device: Oxymask Oxygen Delivery: 2 LPM    General Appearance: Frail appearing 89-year-old male laying on hospitals.  He appears acutely ill and toxic  Eyes: No scleral icterus or injection  HEENT: Rhinophyma, dry oral mucosa.  Respiratory: Distant breath sounds bilaterally to auscultation  Cardiovascular: Regular rate and rhythm.  Heart rate in the 90s range.  GI: Abdomen soft.  Had some voluntary guarding throughout abdomen, but with persistent pressure, relaxes and has no grimace or guarding.  No palpable mass.  Musculoskeletal: Moderate diffuse muscular wasting and subcutaneous fat loss.  Fat loss is most notable over chest wall.  Neurologic: Waxing and waning encephalopathy consistent with delirium.  Action tremor present.  Mentation slowed    Medical Decision Making       80 MINUTES SPENT BY ME on the date of service doing chart review, history, exam, documentation & further activities per the note.      Data     I have  personally reviewed the following data over the past 24 hrs:    11.3 (H)  \   12.0 (L)   / 150     137 101 18.8 /  160 (H)   3.9 28 1.36 (H) \     ALT: 7 AST: 12 AP: 126 TBILI: 0.6   ALB: 3.6 TOT PROTEIN: 6.9 LIPASE: 16     Trop: 59 (H) BNP: 6,480 (H)     Procal: 0.21 CRP: N/A Lactic Acid: 0.8         Imaging results reviewed over the past 24 hrs:   Recent Results (from the past 24 hour(s))   XR Chest 2 Views    Narrative    EXAM: XR CHEST 2 VIEWS  LOCATION: [facility]  DATE: 11/25/2023    INDICATION: Fever  COMPARISON: 09/12/2023      Impression    IMPRESSION: Borderline cardiomegaly. Previous cardiac valvular surgery. Fullness of central pulmonary vessels similar to previous exam. Mild diffuse interstitial prominence stable but questioning slight more focal right infrahilar infiltrate and possible   developing pneumonia right base.   CT Head w/o Contrast    Narrative    EXAM: CT HEAD W/O CONTRAST  LOCATION: [facility]  DATE: 11/26/2023    INDICATION: Confusion.  COMPARISON: 10/26/2023  TECHNIQUE: Routine CT Head without IV contrast. Multiplanar reformats. Dose reduction techniques were used.    FINDINGS:  INTRACRANIAL CONTENTS: No intracranial hemorrhage, extraaxial collection, or mass effect.  No CT evidence of acute infarct. Mild presumed chronic small vessel ischemic changes. Mild generalized volume loss. No hydrocephalus.    Note is made of coarse atherosclerotic calcifications of the intracranial vasculature.      VISUALIZED ORBITS/SINUSES/MASTOIDS: Prior bilateral cataract surgery. Visualized portions of the orbits are otherwise unremarkable. Mild mucosal thickening scattered about the paranasal sinuses. No middle ear or mastoid effusion.    BONES/SOFT TISSUES: No acute abnormality.      Impression    IMPRESSION:  1.  No CT evidence for acute intracranial process.  2.  Brain atrophy and presumed chronic microvascular ischemic changes as above.   CT Chest PE  Abdomen Pelvis w Contrast    Narrative    EXAM: CT CHEST PE ABDOMEN PELVIS W CONTRAST  LOCATION: [facility]  DATE: 11/26/2023    INDICATION: Shortness of breath. Hypoxia. Abdominal pain. Fever.  COMPARISON: CT abdomen and pelvis on 09/12/2023 and chest CT on 09/11/2023.  TECHNIQUE: CT chest pulmonary angiogram and routine CT abdomen pelvis with IV contrast. Arterial phase through the chest and venous phase through the abdomen and pelvis. Multiplanar reformats and MIP reconstructions were performed. Dose reduction   techniques were used.   CONTRAST: 74 mL of Isovue-370.    FINDINGS:  ANGIOGRAM CHEST: Few scattered small filling defects within the subsegmental pulmonary artery is most prominent in the left lower lobe (series 3 image 175), likely represents small pulmonary emboli. No evidence of right heart strain.    LUNGS AND PLEURA: Postsurgical changes of right lower lobe wedge resection. Small to moderate size right pleural effusion and associated basilar atelectasis/consolidation. No significant left pleural effusion. No significant pneumothorax. Upper lobes   predominant emphysematous changes. Right basilar dense consolidative pulmonary opacities along the wedge resection margin, causes narrowing of the adjacent right lower lobe pulmonary artery (series 5 image 157), worrisome for local recurrence or less   likely infection. Multiple nodular pulmonary opacities in the left lower lobe measure up to 1 cm (series 5 image 178), could be metastatic or infectious.    MEDIASTINUM/AXILLAE: No cardiomegaly or significant pericardial effusion. Multiple prominent but not significantly enlarged mediastinal and hilar lymph nodes, indeterminate, could be reactive.    CORONARY ARTERY CALCIFICATION: Moderate.    HEPATOBILIARY: Multiple subcentimeter hypodense foci in the liver, are too small to characterize. The gallbladder is unremarkable.    PANCREAS: No main pancreatic ductal dilatation or definite  solid pancreatic mass.    SPLEEN: No splenomegaly.    ADRENAL GLANDS: No adrenal nodules.    KIDNEYS/BLADDER: No hydronephrosis in either kidney. Contrast within the renal collecting system, precludes detailed evaluation for kidney stones.    BOWEL: No abnormally dilated bowel loops. Mild distal colonic wall thickening predominantly involving the sigmoid colon and rectum, nonspecific, can be seen with acute proctocolitis.    PERITONEUM: No significant free fluid in the abdomen and pelvis. No free peritoneal or portal venous gas.    LYMPH NODES: No significant abdominopelvic lymphadenopathy.    VASCULATURE: Moderate atherosclerotic vascular calcification of the abdominal aorta and iliac vessels. 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    PELVIC ORGANS: Mildly enlarged heterogenous appearance of the prostate gland, of indeterminate etiology. Mild diffuse urinary bladder wall thickening, nonspecific, can be seen with underdistention versus chronic bladder outlet obstruction versus chronic   cystitis.    MUSCULOSKELETAL: Multilevel degenerative changes of the spine.      Impression    IMPRESSION:  1.  Few scattered small filling defects in the subsegmental pulmonary arteries most prominent of the left lower lobe, likely represents small pulmonary emboli.  2.  Medial right lower lobe patchy consolidative pulmonary opacities, along the right lower lobe wedge resection, worrisome for local recurrence versus less likely infection, can be further evaluated with dedicated PET/CT or tissue sampling.  3.  Multiple pulmonary nodules most prominent in the left lower lobe, slightly increased in size as compared to 09/11/2023 exam, likely metastatic.  4.  Small to moderate right pleural effusion.  5.  Mild distal colonic and rectal wall thickening, nonspecific, can be seen with acute proctocolitis.  6.  Mildly enlarged heterogenous appearance of the prostate gland, of  indeterminate etiology, recommend correlation with serum PSA.

## 2023-11-26 NOTE — PROGRESS NOTES
RECEIVING UNIT ED HANDOFF REVIEW    ED Nurse Handoff Report was reviewed by: Danielle Llamas RN on November 26, 2023 at 9:06 AM

## 2023-11-26 NOTE — ED NOTES
Bed: ED01  Expected date:   Expected time:   Means of arrival:   Comments:  HEMS 442 89M  low sats, fever

## 2023-11-26 NOTE — PLAN OF CARE
11-26-23 1900     Trauma/Ortho/Medical (Choose one) :  Medical     Diagnosis: Pneumonia  Mental Status:  A&O x3, forgetful  Activity/dangle: A2, gait belt, walker   Diet:  regular, slightly thick liquid  Pain: denies  Suazo/Voiding:  bedside commode, incontinent of stool  Tele/Restraints/Iso: Enteric precaution  02/LDA: room air, PIV SL  D/C Date: TBD  Other Info:  Heparin drip @1350 Units/hr, Hep Xa recheck at 2240                     Need to collect sputum specimen

## 2023-11-26 NOTE — PHARMACY-ADMISSION MEDICATION HISTORY
Pharmacy Intern Admission Medication History    Admission medication history is complete. The information provided in this note is only as accurate as the sources available at the time of the update.    Information Source(s): Family member and CareEverywhere/SureScripts via in-person and phone    Pertinent Information:   -Med list verified via wife Roberta over the phone  -The patient has not filled Breo Ellipta inhaler due to cost      Changes made to PTA medication list:  Added: None  Deleted:   Duloxetine  Tamsulosin  Changed: None    Medication Affordability:  Not including over the counter (OTC) medications, was there a time in the past 3 months when you did not take your medications as prescribed because of cost?: No    Allergies reviewed with patient and updates made in EHR: yes    Medication History Completed By: Anastasiia Manjarrez 11/26/2023 9:19 AM    PTA Med List   Medication Sig Last Dose    acetaminophen (TYLENOL) 500 MG tablet Take 1,000 mg by mouth 3 times daily as needed  at PRN    albuterol (PROAIR HFA/PROVENTIL HFA/VENTOLIN HFA) 108 (90 Base) MCG/ACT inhaler Inhale 2 puffs into the lungs every 6 hours as needed  at PRN    albuterol (PROVENTIL) (2.5 MG/3ML) 0.083% neb solution Take 1 vial (2.5 mg) by nebulization every 6 hours as needed for shortness of breath / dyspnea or wheezing  at PRN    atorvastatin (LIPITOR) 10 MG tablet Take 1 tablet (10 mg) by mouth daily 11/25/2023 at AM    famotidine (PEPCID) 40 MG tablet TAKE ONE TABLET BY MOUTH TWICE DAILY 11/25/2023 at PM    ferrous sulfate (FE TABS) 325 (65 Fe) MG EC tablet Take 1 tablet (325 mg) by mouth 2 times daily 11/25/2023 at PM    fludrocortisone (FLORINEF) 0.1 MG tablet Take 1 tablet (0.1 mg) by mouth daily 11/25/2023 at AM    gabapentin (NEURONTIN) 300 MG capsule Take 2 capsules (600 mg) by mouth At Bedtime For neuropathy pain 11/25/2023 at PM    tiotropium (SPIRIVA) 18 MCG inhaled capsule Inhale 18 mcg into the lungs daily 11/25/2023 at AM

## 2023-11-26 NOTE — ED TRIAGE NOTES
Pt biba from home - hx of recent cdiff and copd, normally on 2l while sleeping - ems noted sats to be 82% on room air today with fever of 104. Wife reports pt is more fatigued today - but was walking around normally earlier today.

## 2023-11-27 NOTE — PROGRESS NOTES
"   11/27/23 1100   Appointment Info   Signing Clinician's Name / Credentials (PT) Lexi Funes DPT   Living Environment   People in Home spouse   Current Living Arrangements house   Home Accessibility stairs to enter home;stairs within home   Number of Stairs, Main Entrance 2   Stair Railings, Main Entrance railings safe and in good condition   Transportation Anticipated family or friend will provide   Living Environment Comments Pt is confused throughout session and a poor historian, per chart review, pt lives with his spouse in a house with 2 PATI.   Self-Care   Usual Activity Tolerance good   Current Activity Tolerance fair   Regular Exercise No   Equipment Currently Used at Home cane, straight;walker, rolling   Fall history within last six months no   Activity/Exercise/Self-Care Comment Pt reports ambulating w/ FWW at home, per chart review pt uses a SPC and FWW at home from most recent hospitalization but is able to complete most ADL's IND   General Information   Onset of Illness/Injury or Date of Surgery 11/25/23   Referring Physician Glenny Kelly MD   Patient/Family Therapy Goals Statement (PT) \"To go back home\"   Pertinent History of Current Problem (include personal factors and/or comorbidities that impact the POC) Pt is a 89 year old male admitted on 11/25/2023. He presents to the emergency department for fever at home as well as apparently diarrhea.  Found to have left lower lobe pulmonary emboli, proctocolitis, concern for local recurrence and metastatic nodules from prior adenocarcinoma resection.   Existing Precautions/Restrictions fall   Weight-Bearing Status - LUE full weight-bearing   Weight-Bearing Status - RUE full weight-bearing   Weight-Bearing Status - LLE full weight-bearing   Weight-Bearing Status - RLE full weight-bearing   Cognition   Affect/Mental Status (Cognition) confused   Orientation Status (Cognition) oriented to;person;verbal cues/prompts needed for orientation;disoriented " to;place;time   Follows Commands (Cognition) follows one-step commands;repetition of directions required;verbal cues/prompting required   Cognitive Status Comments Pt's cognition is waxing and waning at this time. Pt unable to state place or time during session but able to respond to cues   Integumentary/Edema   Integumentary/Edema no deficits were identifed   Posture    Posture Forward head position;Protracted shoulders   Range of Motion (ROM)   Range of Motion ROM deficits secondary to weakness   Strength (Manual Muscle Testing)   Strength (Manual Muscle Testing) Deficits observed during functional mobility   Bed Mobility   Comment, (Bed Mobility) Supine>sitting EOB completed w/ CGA   Transfers   Comment, (Transfers) Sit>stand completed w/ Min Ax1   Gait/Stairs (Locomotion)   Comment, (Gait/Stairs) Pt ambulated w/ FWW and CGA-Min Ax1   Balance   Balance other (describe)   Balance Comments Pt demonstrated good sitting balance, required FWW for all standing and dynamic balance   Sensory Examination   Sensory Perception patient reports no sensory changes   Coordination   Coordination no deficits were identified   Muscle Tone   Muscle Tone no deficits were identified   Clinical Impression   Criteria for Skilled Therapeutic Intervention Yes, treatment indicated   PT Diagnosis (PT) Impaired functional mobility   Influenced by the following impairments Weakness, impaired activity tolerance, impaired balance   Functional limitations due to impairments Impaired gait and inability to complete ADL's   Clinical Presentation (PT Evaluation Complexity) stable   Clinical Presentation Rationale Clinical judgment   Clinical Decision Making (Complexity) low complexity   Planned Therapy Interventions (PT) balance training;bed mobility training;gait training;home exercise program;motor coordination training;neuromuscular re-education;patient/family education;postural re-education;ROM (range of motion);stair  training;stretching;strengthening;transfer training;progressive activity/exercise;risk factor education;home program guidelines   Risk & Benefits of therapy have been explained evaluation/treatment results reviewed;care plan/treatment goals reviewed;risks/benefits reviewed;current/potential barriers reviewed;participants voiced agreement with care plan;participants included;patient   PT Total Evaluation Time   PT Eval, Low Complexity Minutes (96769) 10   Physical Therapy Goals   PT Frequency 5x/week   PT Predicted Duration/Target Date for Goal Attainment 12/04/23   PT Goals Bed Mobility;Transfers;Gait;Stairs   PT: Bed Mobility Independent;Supine to/from sit;Rolling;Bridging   PT: Transfers Modified independent;Sit to/from stand;Bed to/from chair;Assistive device   PT: Gait Modified independent;Rolling walker;150 feet   PT: Stairs Supervision/stand-by assist;2 stairs;Rail on both sides   Interventions   Interventions Quick Adds Therapeutic Activity;Gait Training   Therapeutic Activity   Therapeutic Activities: dynamic activities to improve functional performance Minutes (55844) 20   Treatment Detail/Skilled Intervention Evaluation completed and treatment indicated. Pt laying in BM upon arrival, increased time needed for clean-up with assist of RN. Pt demonstrated rolling to R and L sidelying with use of bed rails and CGA, required intermittent Min Ax1 to maintain sidelying position. Supine>sitting EOB completed w/ CGA, cues given for bridging LE and technique throughout. Once sitting EOB, pt demonstrated good sitting balance at EOB. Sit>stand completed w/ Min Ax1, cues given to push up from bed rather than FWW when completing. Pt ambulated ~8 ft over to chair w/ FWW and CGA, cues given for upright posture and walker proximity. Pt cued to reach abck for chair armrests prior to sitting, able to scoot back in chair on own. SpO2 dropped to 88% while on 2 L supp O2, pt cued for purse-lip breathing, educated on using  supplemental O2 at this time. Recovered to 92% after 1-2 min rest break. No further ambulation attempted at this time. Pt left sitting up in chair with all needs in reach and chair alarm on.   Gait Training   Treatment Detail/Skilled Intervention Gait initiated, see TA for details.   PT Discharge Planning   PT Discharge Recommendation (DC Rec) Transitional Care Facility; home with assist; home with home care physical therapy   PT Rationale for DC Rec Pt is currently moving below baseline mobility, currently requiring Ax1 for all functional mobility at this time and on 2 L supplemental O2 at this time. Pt is limited by activity tolerance, balance, confusion, and weakness at this time, recommend TCU in order to address these deficitis prior to returning home with assist of spouse. Pending progress and level of assist at home with wife, pt may be safe to return home with assist of wife and usage of FWW for mobility.   PT Brief overview of current status CGA-Min Ax1 for all functional mobility   Total Session Time   Timed Code Treatment Minutes 20   Total Session Time (sum of timed and untimed services) 30

## 2023-11-27 NOTE — PROGRESS NOTES
[facility]    Infectious Disease Progress Note    Date of Service (when I saw the patient): 11/27/2023     Assessment:  89 YM with multiple medical conditions, hx of C.diff colitis in July with relapse in August, was to be on prolonged pulse oral vancomycin taper, but discontinued vancomycin as diarrhea improved, and more recently admitted with recurrent diarrhea, leukocytosis and hypotension related to C.diff relapse treated with long taper of oral vancomycin with last date being 11/15/23. Now presenting again with recurrent c.diff and possible pneumonia.      -Recurrent C.diff colitis with diarrhea, fever, and mild leukocytosis  -Small pulmonary emboli  -CT with concern for pulmonary opacities along RLL wedge resection site worrisome for local recurrence. Also has multiple pulmonary nodules increased in zize concerning for metastatic disease  -COPD and right lung adenocarcinoma with questionable recurrence of lung cancer in RLL per imaging this admission   -Small PE this admission, on IV heparin now.  -Atrial fibrillation  -FRED on Chronic kidney disease  -chronic medical conditions- hx of DVT/PE, atrial fibrillation, HTN, BPH, non Hodgkin lymphoma, GERD     Recommendations:  Discontinue Cefepime and Azithromycin  Treat c.diff with oral vancomycin  Follow clinically   ID will continue to follow    Galilea Moses MD    Interval History   Resting comfortably, confused, breathing comfortably, on oxygen chronically, complains of ongoing diarrhea    Physical Exam   Temp: 98.2  F (36.8  C) Temp src: Oral BP: 100/54 Pulse: 103   Resp: 18 SpO2: 100 % O2 Device: Nasal cannula Oxygen Delivery: 3 LPM  Vitals:    11/26/23 1654 11/27/23 0624   Weight: 67 kg (147 lb 11.3 oz) 68 kg (149 lb 14.6 oz)     Vital Signs with Ranges  Temp:  [97.3  F (36.3  C)-98.2  F (36.8  C)] 98.2  F (36.8  C)  Pulse:  [] 103  Resp:  [18] 18  BP: (100-121)/(54-63) 100/54  SpO2:  [89 %-100 %] 100 %    Constitutional: Awake,  alert, cooperative, no apparent distress  Lungs: clear  Abdomen: soft, non tender  Skin: No rash    Other:    Medications    heparin      - MEDICATION INSTRUCTIONS -        apixaban ANTICOAGULANT  10 mg Oral BID    Followed by    [START ON 12/4/2023] apixaban ANTICOAGULANT  5 mg Oral BID    atorvastatin  10 mg Oral Daily    famotidine  40 mg Oral BID    fludrocortisone  0.1 mg Oral Daily    fluticasone-vilanterol  1 puff Inhalation Daily    gabapentin  600 mg Oral At Bedtime    sodium chloride (PF)  3 mL Intracatheter Q8H    sodium chloride (PF)  3 mL Intracatheter Q8H    tamsulosin  0.4 mg Oral Daily    umeclidinium  1 puff Inhalation Daily    vancomycin  125 mg Oral 4x Daily       Data   All microbiology laboratory data reviewed.  Recent Labs   Lab Test 11/27/23  1324 11/25/23  2302 10/26/23  1733   WBC 9.6 11.3* 5.7   HGB 10.7* 12.0* 10.8*   HCT 34.5* 38.9* 35.9*   MCV 84 84 86   * 150 169     Recent Labs   Lab Test 11/27/23  0500 11/25/23  2301 10/26/23  1733   CR 1.18* 1.36* 1.36*     Recent Labs   Lab Test 10/06/22  1526   SED 43*           Imaging  EXAM: CT CHEST PE ABDOMEN PELVIS W CONTRAST  LOCATION: [facility]  DATE: 11/26/2023     INDICATION: Shortness of breath. Hypoxia. Abdominal pain. Fever.  COMPARISON: CT abdomen and pelvis on 09/12/2023 and chest CT on 09/11/2023.  TECHNIQUE: CT chest pulmonary angiogram and routine CT abdomen pelvis with IV contrast. Arterial phase through the chest and venous phase through the abdomen and pelvis. Multiplanar reformats and MIP reconstructions were performed. Dose reduction   techniques were used.   CONTRAST: 74 mL of Isovue-370.     FINDINGS:  ANGIOGRAM CHEST: Few scattered small filling defects within the subsegmental pulmonary artery is most prominent in the left lower lobe (series 3 image 175), likely represents small pulmonary emboli. No evidence of right heart strain.     LUNGS AND PLEURA: Postsurgical changes of right lower  lobe wedge resection. Small to moderate size right pleural effusion and associated basilar atelectasis/consolidation. No significant left pleural effusion. No significant pneumothorax. Upper lobes   predominant emphysematous changes. Right basilar dense consolidative pulmonary opacities along the wedge resection margin, causes narrowing of the adjacent right lower lobe pulmonary artery (series 5 image 157), worrisome for local recurrence or less   likely infection. Multiple nodular pulmonary opacities in the left lower lobe measure up to 1 cm (series 5 image 178), could be metastatic or infectious.     MEDIASTINUM/AXILLAE: No cardiomegaly or significant pericardial effusion. Multiple prominent but not significantly enlarged mediastinal and hilar lymph nodes, indeterminate, could be reactive.     CORONARY ARTERY CALCIFICATION: Moderate.     HEPATOBILIARY: Multiple subcentimeter hypodense foci in the liver, are too small to characterize. The gallbladder is unremarkable.     PANCREAS: No main pancreatic ductal dilatation or definite solid pancreatic mass.     SPLEEN: No splenomegaly.     ADRENAL GLANDS: No adrenal nodules.     KIDNEYS/BLADDER: No hydronephrosis in either kidney. Contrast within the renal collecting system, precludes detailed evaluation for kidney stones.     BOWEL: No abnormally dilated bowel loops. Mild distal colonic wall thickening predominantly involving the sigmoid colon and rectum, nonspecific, can be seen with acute proctocolitis.     PERITONEUM: No significant free fluid in the abdomen and pelvis. No free peritoneal or portal venous gas.     LYMPH NODES: No significant abdominopelvic lymphadenopathy.     VASCULATURE: Moderate atherosclerotic vascular calcification of the abdominal aorta and iliac vessels. 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     PELVIC ORGANS: Mildly enlarged heterogenous appearance of the prostate gland, of indeterminate  etiology. Mild diffuse urinary bladder wall thickening, nonspecific, can be seen with underdistention versus chronic bladder outlet obstruction versus chronic   cystitis.     MUSCULOSKELETAL: Multilevel degenerative changes of the spine.                                                                      IMPRESSION:  1.  Few scattered small filling defects in the subsegmental pulmonary arteries most prominent of the left lower lobe, likely represents small pulmonary emboli.  2.  Medial right lower lobe patchy consolidative pulmonary opacities, along the right lower lobe wedge resection, worrisome for local recurrence versus less likely infection, can be further evaluated with dedicated PET/CT or tissue sampling.  3.  Multiple pulmonary nodules most prominent in the left lower lobe, slightly increased in size as compared to 09/11/2023 exam, likely metastatic.  4.  Small to moderate right pleural effusion.  5.  Mild distal colonic and rectal wall thickening, nonspecific, can be seen with acute proctocolitis.  6.  Mildly enlarged heterogenous appearance of the prostate gland, of indeterminate etiology, recommend correlation with serum PSA.

## 2023-11-27 NOTE — PROGRESS NOTES
VAT paged for additional PIV, upon arrival in room, patient begins flailing arms, striking out at this writer, refusing additional PIV.  Patient then begins to pull on PIVs present, and actively trying to climb out of bed, stating that he needs to leave.  Additional staff immediately in room to assist.  Secondary to patient's refusal, no PIV attempts made, primary RN aware.

## 2023-11-27 NOTE — CONSULTS
Patient has Medicare Advantage through [facility].    Xarelto/Eliquis  --$47/mo ($94 for 3 mo at [facility])  --lf/when total drug costs exceed $4,660, price will increase to a 25% coinsurance, equivalent to $136/mo.    Darlene Rivers  Pharmacy Technician/Liaison, Discharge Pharmacy   [phone] (voice or text)  [email or web address]  Upcoming OOO Dec 4-Dec 15

## 2023-11-27 NOTE — PROGRESS NOTES
[facility] [place]    Medicine Progress Note - Hospitalist Service    Date of Admission:  11/25/2023    Assessment & Plan   Hermilo Velasquez is a 89 year old male with h/o recurrent C. difficile colitis, adenocarcinoma of the right lung, COPD/emphysema with chronic hypoxic respiratory failure, on 2L of oxygen, h/o DVT/PE, paroxysmal atrial fibrillation, hypertension, CKD stage III, dyslipidemia, BPH, non-Hodgkin lymphoma, GERD, who presented on 11/25/2023 due to fever of 104 at home, increased confusion, diarrhea.  Work-up showed recurrent C. difficile proctocolitis, left lower lobe PE, probable pneumonia, concern for recurrence of lung cancer.      He was diagnosed with C. difficile in July 2023 and treated with 10 days of oral vancomycin.  Had recurrence in August and was treated with a prolonged vancomycin taper that he did not complete.  He was admitted in September due to recurrent C. difficile and treated with a prolonged vancomycin taper.  He was referred to [facility] at discharge for fecal microbial transplant.  This has not happened yet.    CT PE/A/P 11/25/2023 -   1.  Few scattered small filling defects in the subsegmental pulmonary arteries most prominent of the left lower lobe, likely represents small pulmonary emboli.  2.  Medial right lower lobe patchy consolidative pulmonary opacities, along the right lower lobe wedge resection, worrisome for local recurrence versus less likely infection, can be further evaluated with dedicated PET/CT or tissue sampling.  3.  Multiple pulmonary nodules most prominent in the left lower lobe, slightly increased in size as compared to 09/11/2023 exam, likely metastatic.  4.  Small to moderate right pleural effusion.  5.  Mild distal colonic and rectal wall thickening, nonspecific, can be seen with acute proctocolitis.  6.  Mildly enlarged heterogenous appearance of the prostate gland, of indeterminate etiology, recommend correlation with serum  PSA.    Recurrent C. difficile procto-colitis, with sepsis  - had fever of 104 at home, 102.4 in ER. With fever, , RR 24, WBC 11.3 -met criteria for sepsis.  Lactate was normal   - CT scan showed Mild distal colonic and rectal wall thickening   - Tested positive for C. Difficile - PCR, GDH and toxin - all positive on 11/25  - ID consulted. On p.o. vancomycin, continue  - Needs to follow-up with [facility] for fecal microbiota transplantation.  Referral was placed at discharge in September and it seems again by PCP in October       Subsegmental left lower lobe pulmonary embolism, 11/25/2023  -Noted on CT PE on 11/25  -Oxygen requirement stable at baseline.  -Currently on IV heparin.  Plan to switch to DOAC's.  Consult pharmacy liaison for cost     Probable right lower lobe pneumonia, organism unspecified  -Has patchy consolidative opacities in right lower lobe along previous wedge resection.  Though this is concerning for local recurrence of adenocarcinoma, pneumonia is not ruled out  -On IV cefepime and ceftriaxone.  Infectious disease following    Acute metabolic encephalopathy  -Multifactorial due to sepsis and delirium.  Remains confused  -Continue supportive care.  Treat infection as above    Hypokalemia, potassium 3.1  -Due to GI loss  -Replace potassium per protocol     Adenocarcinoma of the right lung, diagnosed 2019, s/p wedge resection  Right lower lobe consolidation-concerning for recurrence versus pneumonia  Bilateral pulmonary nodules  Moderate right pleural effusion  - follows with [place] [facility].  Was diagnosed with stage I adenocarcinoma of right lower lobe in 2019 and is s/p limited thoracotomy with wedge resection in March 2019.    - CT chest on 9/11 - Multiple new pulmonary nodules. 1.8 cm nodule in the RLL WAS concerning for recurrent malignancy. Multiple bilateral new pulmonary nodules were also concerning for metastatic disease   - PET scan 9/22 showed moderate FDG activity in the left  seventh rib anterolaterally, nonspecific and could represent a lymphoma/myeloma vs the sequelae of trauma. Small LLL pulmonary nodules too small to characterize.  FDG activity in the right lower lobe near the wedge resection, most likely inflammation.  Continued CT or PET/CT surveillance recommended. FDG avid soft tissue nodule in the right tongue base may represent a synchronous squamous cell carcinoma.  Plan was to repeat CT chest in 2 months and refer to ENT.  Has not been seen by ENT yet.   - CT PE on 11/25 showed RLL patchy consolidative pulmonary opacities, worrisome for local recurrence vs infection, Multiple pulmonary nodules most prominent in the left lower lobe, slightly increased in size as compared to 09/11/2023, moderate right pleural effusio  np with PLAN_ no current left rib pain.  Plan follow up chest CT in about two months to assess - Hem onc consulted.  Lung nodules are too small to biopsy.  Recommended monitoring for now.  Patient and wife would like treatment if found to have recurrent disease.  Continued monitoring recommended.  We will follow-up with collagen as outpatient    FDG avid soft tissue nodule in right tongue base - concerning for squamous cell carcinoma  - Heme-onc had made referral to ENT as outpatient.  Continue to follow-up with ENT      COPD with emphysema  Chronic hypoxic respiratory failure on oxygen 2 L  - Respiratory status stable and at baseline.  Continue supplemental oxygen 2 L/min  - Continue Breo inhaler and Incruse     Severe aortic stenosis, s/p bioprosthetic aortic valve replacement  Dyslipidemia  - Continue atorvastatin     Chronically elevated troponin due to chronic myocardial injury  - mildly elevated troponins of 62, 59 on admission.  These are stable/slightly decreased from September and likely represent chronic myocardial injury.   - Echo 9/13 showed atrial fibrillation, bioprosthetic aortic valve well-seated without signs of periprosthetic regurgitation.   Normal LVEF of 55-60%, septal motion consistent with conduction abnormality.  RV not well visualized, appears moderately dilated with mildly decreased function trace mitral and tricuspid regurgitation.  No significant change compared to 11/2022    Chronic paroxysmal atrial fibrillation  -Not on anticoagulation at baseline     CKD stage 3  - Baseline creatinine around 1.2-1.4. Creatinine 1.36 on admission, now improved to 1.18.     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  Had peripheral neuropathy.  Treated with rituximab, completed in 2012  - Most recent labs were stable and recheck is planned for next month.  Heme-onc did not recommend any further testing at this time.    - Continue gabapentin     Tobacco use disorder  -Patient continues to smoke at least 3 cigarettes/day  -Smoking cessation is strongly advised    Generalized weakness  -Multifactorial due to acute illness, physical deconditioning  -PT/OT evaluation               Diet: Advance Diet as Tolerated: Regular Diet Adult    DVT Prophylaxis: IV heparin  Suazo Catheter: Not present  Lines: None     Cardiac Monitoring: None  Code Status: Full Code      Clinically Significant Risk Factors        # Hypokalemia: Lowest K = 3.1 mmol/L in last 2 days, will replace as needed   # Hypocalcemia: Lowest Ca = 8.1 mg/dL in last 2 days, will monitor and replace as appropriate         # Hypertension: Noted on problem list  # Chronic heart failure with preserved ejection fraction: heart failure noted on problem list and last echo with EF >50%           # COPD: noted on problem list        Disposition Plan      Expected Discharge Date: 11/29/2023                    Glenny Kelly MD  Hospitalist Service  [facility]  Securely message with [employer] (more info)  Text page via [employer] Paging/Directory    ______________________________________________________________________    Interval History   Patient sitting in bed.  Somewhat confused.  Vital signs are stable.  Now afebrile.  Denies abdominal pain.  Complains of diarrhea.  Denies any chest pain or shortness of breath.  Oxygen requirements at baseline.    Physical Exam   Vital Signs: Temp: 97.3  F (36.3  C) Temp src: Oral BP: 121/63 Pulse: 87   Resp: 18 SpO2: 93 % O2 Device: Nasal cannula Oxygen Delivery: 2 LPM  Weight: 149 lbs 14.6 oz    Constitutional - awake and alert, resting in bed, in no acute distress  Cardiovascular - regular rate and rhythm, no murmurs, no edema  Pulmonary - lungs are clear to auscultation bilaterally, no wheezing or rhonchi  GI - abdomen is soft, nontender, nondistended  Integumentary - skin is warm and dry, no rashes or ulcers  Neurological - Moving all 4 extremities, normal speech, no focal deficits    Medical Decision Making       50 MINUTES SPENT BY ME on the date of service doing chart review, history, exam, documentation & further activities per the note.      Data     I have personally reviewed the following data over the past 24 hrs:    N/A  \   N/A   / N/A     139 105 17.3 /  108 (H)   3.1 (L); 3.2 (L) 22 1.18 (H) \       Imaging results reviewed over the past 24 hrs:   No results found for this or any previous visit (from the past 24 hour(s)).

## 2023-11-28 NOTE — PROGRESS NOTES
[facility]    Medicine Progress Note - Hospitalist Service    Date of Admission:  11/25/2023    Assessment & Plan   Hermilo Velasquez is a 89 year old male with h/o recurrent C. difficile colitis, adenocarcinoma of the right lung, COPD/emphysema with chronic hypoxic respiratory failure, on 2L of oxygen, h/o DVT/PE, paroxysmal atrial fibrillation, hypertension, CKD stage III, dyslipidemia, BPH, non-Hodgkin lymphoma, GERD, who presented on 11/25/2023 due to fever of 104 at home, increased confusion, diarrhea.  Work-up showed recurrent C. difficile proctocolitis, left lower lobe PE, probable pneumonia vs recurrence of lung cancer.      He was diagnosed with C. difficile in July 2023 and treated with 10 days of oral vancomycin.  Had recurrence in August and was treated with a prolonged vancomycin taper that he did not complete.  He was admitted in September due to recurrent C. difficile and treated with a prolonged vancomycin taper.  He was referred to [facility] at discharge for fecal microbial transplant.  This has not happened yet.    CT PE/A/P 11/25/2023 -   1.  Few scattered small filling defects in the subsegmental pulmonary arteries most prominent of the left lower lobe, likely represents small pulmonary emboli.  2.  Medial right lower lobe patchy consolidative pulmonary opacities, along the right lower lobe wedge resection, worrisome for local recurrence versus less likely infection, can be further evaluated with dedicated PET/CT or tissue sampling.  3.  Multiple pulmonary nodules most prominent in the left lower lobe, slightly increased in size as compared to 09/11/2023 exam, likely metastatic.  4.  Small to moderate right pleural effusion.  5.  Mild distal colonic and rectal wall thickening, nonspecific, can be seen with acute proctocolitis.  6.  Mildly enlarged heterogenous appearance of the prostate gland, of indeterminate etiology, recommend correlation with serum PSA.    Recurrent C.  difficile procto-colitis, with sepsis  - had fever of 104 at home, 102.4 in ER. With fever, , RR 24, WBC 11.3 -met criteria for sepsis.  Lactate was normal   - CT scan showed Mild distal colonic and rectal wall thickening   - Tested positive for C. Difficile - PCR, GDH and toxin - all positive on 11/25  - ID consulted. On p.o. vancomycin, continue  - Needs to follow-up with [facility] for fecal microbiota transplantation.  Referral was placed at discharge in September and again by PCP in October.  Per patient's wife, she has not heard from [facility].  Another order placed so patient appointment can be set up prior to discharge    Subsegmental left lower lobe pulmonary embolism, 11/25/2023  - Noted on CT PE on 11/25  - Oxygen requirement stable at baseline.  - Was initially on IV heparin, transitioned to apixaban on 11/27, continue.      Probable right lower lobe pneumonia, organism unspecified-suspected on admission, now ruled out  - Has patchy consolidative opacities in right lower lobe along previous wedge resection.  This is concerning for local recurrence of adenocarcinoma.  Pneumonia was in differential as well and he was started on IV cefepime and azithromycin on admission.  Infectious disease consulted.  Patient has no new respiratory symptoms.  Fever and sepsis felt to be due to C. difficile colitis.  IV antibiotics discontinued on 11/27  -Monitor off antibiotics    Acute metabolic encephalopathy  - Multifactorial due to sepsis and delirium.  Remains confused  - Continue supportive care.  Treat infection as above    Hypokalemia, potassium 3.1  - Due to GI loss  - Replace potassium per protocol     Adenocarcinoma of the right lung, diagnosed 2019, s/p wedge resection  Right lower lobe consolidation-concerning for recurrence versus pneumonia  Bilateral pulmonary nodules  Moderate right pleural effusion  - follows with [facility].  Was diagnosed with stage I adenocarcinoma of right lower lobe in 2019 and  is s/p limited thoracotomy with wedge resection in March 2019.    - CT chest on 9/11 - Multiple new pulmonary nodules. 1.8 cm nodule in the RLL WAS concerning for recurrent malignancy. Multiple bilateral new pulmonary nodules were also concerning for metastatic disease   - PET scan 9/22 showed moderate FDG activity in the left seventh rib anterolaterally, nonspecific and could represent a lymphoma/myeloma vs the sequelae of trauma. Small LLL pulmonary nodules too small to characterize.  FDG activity in the right lower lobe near the wedge resection, most likely inflammation.  Continued CT or PET/CT surveillance recommended. FDG avid soft tissue nodule in the right tongue base may represent a synchronous squamous cell carcinoma.  Plan was to repeat CT chest in 2 months and refer to ENT.  Has not been seen by ENT yet.   - CT PE on 11/25 showed RLL patchy consolidative pulmonary opacities, worrisome for local recurrence vs infection, Multiple pulmonary nodules most prominent in the left lower lobe, slightly increased in size as compared to 09/11/2023, moderate right pleural effusio  np with PLAN_ no current left rib pain.  Plan follow up chest CT in about two months to assess - Hem onc consulted.  Lung nodules are too small to biopsy.  Recommended monitoring for now.  Patient and wife would like treatment if found to have recurrent disease.  Continued monitoring recommended.  We will follow-up with collagen as outpatient    FDG avid soft tissue nodule in right tongue base - concerning for squamous cell carcinoma  - Heme-onc had made referral to ENT as outpatient.  Continue to follow-up with ENT      COPD with emphysema  Chronic hypoxic respiratory failure on oxygen 2 L  - Respiratory status stable and at baseline.  Continue supplemental oxygen 2 L/min  - Continue Breo inhaler and Incruse     Severe aortic stenosis, s/p bioprosthetic aortic valve replacement  Dyslipidemia  - Continue atorvastatin     Chronically elevated  troponin due to chronic myocardial injury  - mildly elevated troponins of 62, 59 on admission.  These are stable/slightly decreased from September and likely represent chronic myocardial injury.   - Echo 9/13 showed atrial fibrillation, bioprosthetic aortic valve well-seated without signs of periprosthetic regurgitation.  Normal LVEF of 55-60%, septal motion consistent with conduction abnormality.  RV not well visualized, appears moderately dilated with mildly decreased function trace mitral and tricuspid regurgitation.  No significant change compared to 11/2022    Chronic paroxysmal atrial fibrillation  - Not on anticoagulation at baseline.  Now started on apixaban for PE      CKD stage 3  - Baseline creatinine around 1.2-1.4. Creatinine 1.36 on admission, now improved to 1.18.     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  Had peripheral neuropathy.  Treated with rituximab, completed in 2012  - Most recent labs were stable and recheck is planned for next month.  Heme-onc did not recommend any further testing at this time.    - Continue gabapentin     Tobacco use disorder  - Patient continues to smoke at least 3 cigarettes/day  - Smoking cessation is strongly advised    Generalized weakness  - Multifactorial due to acute illness, physical deconditioning  - PT/OT evaluation  -  consult for discharge planning               Diet: Advance Diet as Tolerated: Regular Diet Adult    DVT Prophylaxis: DOAC  Suazo Catheter: Not present  Lines: None     Cardiac Monitoring: None  Code Status: Full Code      Clinically Significant Risk Factors        # Hypokalemia: Lowest K = 3.1 mmol/L in last 2 days, will replace as needed         # Thrombocytopenia: Lowest platelets = 117 in last 2 days, will monitor for bleeding   # Hypertension: Noted on problem list    # Chronic heart  failure with preserved ejection fraction: heart failure noted on problem list and last echo with EF >50%           # COPD: noted on problem list        Disposition Plan      Expected Discharge Date: 11/30/2023                    Glenny Kelly MD  Hospitalist Service  [facility]  Securely message with [employer] (more info)  Text page via [facility] Paging/Directory   ______________________________________________________________________    Interval History   Patient sitting in bed.  Somnolent, confused.  Safety sitter present in room.  Remains afebrile.  Vital signs are stable.  Has abdominal pain.  Continues to have diarrhea, 4 episodes documented in last 24 hours.  .    Physical Exam   Vital Signs: Temp: 98.2  F (36.8  C) Temp src: Oral BP: 103/48 Pulse: 82   Resp: 14 SpO2: 95 % O2 Device: Nasal cannula Oxygen Delivery: 3 LPM  Weight: 149 lbs 14.6 oz    Constitutional - resting in bed, in no acute distress  GI - abdomen is soft, nondistended, mild tenderness to palpation  Integumentary - skin is warm and dry, no rashes or ulcers  Neurological - Moving all 4 extremities, no focal deficits    Medical Decision Making       35 MINUTES SPENT BY ME on the date of service doing chart review, history, exam, documentation & further activities per the note.      Data     I have personally reviewed the following data over the past 24 hrs:    9.6  \   10.7 (L)   / 117 (L)     N/A N/A N/A /  N/A   3.5 N/A N/A \       Imaging results reviewed over the past 24 hrs:   No results found for this or any previous visit (from the past 24 hour(s)).

## 2023-11-28 NOTE — PLAN OF CARE
.Date/Time 11/27/23 [id number]  Diagnosis:Pneumonia, PE, C-diff  POD#:NA  Mental Status:A&OX2, forgetful  Activity/dangle:A1&walker GB  Diet:Reg  Pain:denies  Suazo/Voiding:Urinal, Incontinent BM  Tele/Restraints/Iso:Enteric precaution   02/LDA:2L/ Heparin stopped   D/C Date:TBD   Other Info:VSS 2L NC, Had x3 loose stool. Denies pain. Potassium replaced recheck 0730pm . Heparin stopped @ 1900 & stared  Eliquis. Pt got agitated at the end of the shift , start to pull Iv & get out of bed & needed a sitter by bed side. Will continue monitor.

## 2023-11-28 NOTE — CONSULTS
Care Management Initial Consult    General Information  Assessment completed with: VM-chart review,    Type of CM/SW Visit: Initial Assessment    Primary Care Provider verified and updated as needed: Yes (Dr. Bishop)   Readmission within the last 30 days:        Reason for Consult: discharge planning  Advance Care Planning:            Communication Assessment  Patient's communication style: spoken language (English or Bilingual)             Cognitive  Cognitive/Neuro/Behavioral: .WDL except  Level of Consciousness: intermittent confusion  Arousal Level: opens eyes spontaneously  Orientation: disoriented to, place, time, situation  Mood/Behavior: agitated  Best Language: 0 - No aphasia  Speech: spontaneous    Living Environment:   People in home: spouse  Roberta  Current living Arrangements: house      Able to return to prior arrangements: yes       Family/Social Support:  Care provided by: self, spouse/significant other  Provides care for: no one  Marital Status:   Wife  Roberta       Description of Support System: Supportive, Involved         Current Resources:   Patient receiving home care services:       Community Resources:    Equipment currently used at home: cane, straight, walker, rolling  Supplies currently used at home:      Employment/Financial:  Employment Status: retired        Financial Concerns:             Does the patient's insurance plan have a 3 day qualifying hospital stay waiver?  No    Lifestyle & Psychosocial Needs:  Social Determinants of Health     Food Insecurity: No Food Insecurity (5/12/2020)    Hunger Vital Sign     Worried About Running Out of Food in the Last Year: Never true     Ran Out of Food in the Last Year: Never true   Depression: Not at risk (10/26/2023)    PHQ-2     PHQ-2 Score: 0   Housing Stability: Not on file   Tobacco Use: Medium Risk (10/26/2023)    Patient History     Smoking Tobacco Use: Former     Smokeless Tobacco Use: Never     Passive Exposure: Not on file    Financial Resource Strain: Low Risk  (5/12/2020)    Overall Financial Resource Strain (CARDIA)     Difficulty of Paying Living Expenses: Not hard at all   Alcohol Use: Not on file   Transportation Needs: No Transportation Needs (5/12/2020)    PRAPARE - Transportation     Lack of Transportation (Medical): No     Lack of Transportation (Non-Medical): No   Physical Activity: Inactive (5/12/2020)    Exercise Vital Sign     Days of Exercise per Week: 0 days     Minutes of Exercise per Session: 0 min   Interpersonal Safety: Not At Risk (5/12/2020)    Humiliation, Afraid, Rape, and Kick questionnaire     Fear of Current or Ex-Partner: No     Emotionally Abused: No     Physically Abused: No     Sexually Abused: No   Stress: No Stress Concern Present (5/12/2020)    [employer] - Occupational Stress Questionnaire     Feeling of Stress : Not at all   Social Connections: Moderately Isolated (5/12/2020)    Social Connection and Isolation Panel [NHANES]     Frequency of Communication with Friends and Family: Twice a week     Frequency of Social Gatherings with Friends and Family: Twice a week     Attends Holiness Services: Never     Active Member of Clubs or Organizations: No     Attends Club or Organization Meetings: Never     Marital Status:        Functional Status:  Prior to admission patient needed assistance:              Mental Health Status:          Chemical Dependency Status:                Values/Beliefs:  Spiritual, Cultural Beliefs, Holiness Practices, Values that affect care:                 Additional Information:  Consult placed for discharge planning. Patient admitted on 11/25/2023. He presents to the emergency department for fever at home as well as apparently diarrhea.  Found to have left lower lobe pulmonary emboli, proctocolitis, concern for local recurrence and metastatic nodules from prior adenocarcinoma resection. Tentative discharge date on 11/30/23. Writer reviewed  chart and at this time patient has a sitter at bedside. SW will follow for discharge planning and discuss plan with patient's wife. Per chart review, patient has been to [facility] in the past for TCU.    SY Ramsey

## 2023-11-28 NOTE — PROGRESS NOTES
Oncology Quick Note   [facility]  Date of Service : 11/27/2023    Primary Oncologist:        Assessment and Plan:   Mr Velasquez is a pleasant 89-year-old gentleman with history of stage Ia adenocarcinoma of, diagnosed 2019, post wedge resection, as well as lymphoplasmacytic lymphoma/ Waldernstrom's macroglobulinemia (WM) diagnosed and treated with single agent rituximab in 2012.   History of recent C. difficile, admitted with recurrent diarrhea and hypoxia  -C. difficile again positive  -Possible pneumonia RLL  -?  Recurrent lung cancer in left lower lobe  -On PET scan from September-known multiple lung nodules disease recurrence as well as FDG avid lesion base of the tongue  -Possible small PE LLL   -History of atrial fibrillation (not on anticoagulation)  -Aortic stenosis  -Peripheral neuropathy affecting the feet-stable  -COPD-on oxygen at night as outpatient  - FULL CODE     PLAN:  - Lung nodules too small to biopsy ( also present on PET from Sept) . Continue to monitor. Confirmed with pt and wife- that he would like treatment if found to have recurrent disease  - Possible PE on CT. Continue heparin for now. Consider lower ext venous doppler US to r/o DVT, High risk for VTE- current smoker and possible cancer recurrence. Can be switched to DOAC's once stable   - Most recent outpt labs for WM stable, and plan to recheck next month. So hold off on testing for now, Hgb stable  - Continue Vancomycin for C diff. With recurrent episode   - On cefipime and ceftriaxone now   -FDG avid lesion base of the tongue noted on PET scan from September.  Patient missed his ENT consult as outpatient. Will need to be rearranged  -Smoking cessation to be strongly encouraged at discharge  -Patient is FULL code        Patrick Dreyer, DO  [facility]   Office: [phone]         Interval History:   Still with diarrhea per chart review          Physical Exam:     Vitals:    11/26/23 1654 11/27/23  0624   Weight: 67 kg (147 lb 11.3 oz) 68 kg (149 lb 14.6 oz)     Vital Signs with Ranges  Temp:  [97.3  F (36.3  C)-98.2  F (36.8  C)] 98.2  F (36.8  C)  Pulse:  [] 103  Resp:  [18] 18  BP: (100-121)/(54-63) 100/54  SpO2:  [89 %-100 %] 100 %      Data   Results for orders placed or performed during the hospital encounter of 11/25/23 (from the past 24 hour(s))   Heparin Unfractionated Anti Xa Level   Result Value Ref Range    Anti Xa Unfractionated Heparin 0.49 For Reference Range, See Comment IU/mL    Narrative    Therapeutic Range: UFH: 0.25-0.50 IU/mL for low intensity dosing,  0.30-0.70 IU/mL for high intensity dosing DVT and PE.  This test is not validated for other direct factor X inhibitors (e.g. rivaroxaban, apixaban, edoxaban, betrixaban, fondaparinux) and should not be used for monitoring of other medications.   Heparin Unfractionated Anti Xa Level   Result Value Ref Range    Anti Xa Unfractionated Heparin <0.10 For Reference Range, See Comment IU/mL    Narrative    Therapeutic Range: UFH: 0.25-0.50 IU/mL for low intensity dosing,  0.30-0.70 IU/mL for high intensity dosing DVT and PE.  This test is not validated for other direct factor X inhibitors (e.g. rivaroxaban, apixaban, edoxaban, betrixaban, fondaparinux) and should not be used for monitoring of other medications.   Potassium   Result Value Ref Range    Potassium 3.1 (L) 3.4 - 5.3 mmol/L   Magnesium   Result Value Ref Range    Magnesium 1.9 1.7 - 2.3 mg/dL   Basic metabolic panel   Result Value Ref Range    Sodium 139 135 - 145 mmol/L    Potassium 3.2 (L) 3.4 - 5.3 mmol/L    Chloride 105 98 - 107 mmol/L    Carbon Dioxide (CO2) 22 22 - 29 mmol/L    Anion Gap 12 7 - 15 mmol/L    Urea Nitrogen 17.3 8.0 - 23.0 mg/dL    Creatinine 1.18 (H) 0.67 - 1.17 mg/dL    GFR Estimate 58 (L) >60 mL/min/1.73m2    Calcium 8.1 (L) 8.2 - 9.6 mg/dL    Glucose 108 (H) 70 - 99 mg/dL   Heparin Unfractionated Anti Xa Level   Result Value Ref Range    Anti Xa  Unfractionated Heparin 0.66 For Reference Range, See Comment IU/mL    Narrative    Therapeutic Range: UFH: 0.25-0.50 IU/mL for low intensity dosing,  0.30-0.70 IU/mL for high intensity dosing DVT and PE.  This test is not validated for other direct factor X inhibitors (e.g. rivaroxaban, apixaban, edoxaban, betrixaban, fondaparinux) and should not be used for monitoring of other medications.   Potassium   Result Value Ref Range    Potassium 3.3 (L) 3.4 - 5.3 mmol/L   CBC with platelets   Result Value Ref Range    WBC Count 9.6 4.0 - 11.0 10e3/uL    RBC Count 4.11 (L) 4.40 - 5.90 10e6/uL    Hemoglobin 10.7 (L) 13.3 - 17.7 g/dL    Hematocrit 34.5 (L) 40.0 - 53.0 %    MCV 84 78 - 100 fL    MCH 26.0 (L) 26.5 - 33.0 pg    MCHC 31.0 (L) 31.5 - 36.5 g/dL    RDW 15.2 (H) 10.0 - 15.0 %    Platelet Count 117 (L) 150 - 450 10e3/uL     *Note: Due to a large number of results and/or encounters for the requested time period, some results have not been displayed. A complete set of results can be found in Results Review.

## 2023-11-28 NOTE — PROGRESS NOTES
Date/Time 11/28/23 1900-0730    Trauma/Ortho/Medical (Choose one) Medical    Diagnosis: PE, Pneumonia, C-Diff,  POD#: NA  Mental Status: A&O x 2, confused and forgetful  Activity/dangle up with assist of 1 GB and walker  Diet: Regular  Pain: denied  Suazo/Voiding: incontinent of B&B  Tele/Restraints/Iso: Enteric Precaution  02/LDA: on 2L NC. PIV SL  D/C Date:TBD  Other Info: Sitter at bedside. Potassium 3.5. recheck at 0600.

## 2023-11-28 NOTE — PROGRESS NOTES
[facility]    Infectious Disease Progress Note    Date of Service : 11/28/2023     Assessment:  89 YM with multiple medical conditions, hx of C.diff colitis in July with relapse in August, was to be on prolonged pulse oral vancomycin taper, but discontinued vancomycin as diarrhea improved, and more recently admitted with recurrent diarrhea, leukocytosis and hypotension related to C.diff relapse treated with long taper of oral vancomycin with last date being 11/15/23. Now presenting again with recurrent c.diff and possible pneumonia.      -Recurrent C.diff colitis with diarrhea, fever, and mild leukocytosis  -Small pulmonary emboli  -CT with concern for pulmonary opacities along RLL wedge resection site worrisome for local recurrence. Also has multiple pulmonary nodules increased in zize concerning for metastatic disease  -COPD and right lung adenocarcinoma with questionable recurrence of lung cancer in RLL per imaging this admission   -Small PE this admission, on IV heparin now.  -Atrial fibrillation  -FRED on Chronic kidney disease  -chronic medical conditions- hx of DVT/PE, atrial fibrillation, HTN, BPH, non Hodgkin lymphoma, GERD     Recommendations:  Treat c.diff with oral vancomycin  Follow clinically   Monitor off antibiotics  Will need prolonged oral vancomycin taper and GI evaluation for FMT  Suspect CT abnormalities may be related to tumor recurrence and PE . No antibiotics in view of C.diff colitis for now          Galilea Moses MD    Interval History   Better today, diarrhea is improving no new respiratory symptoms      Physical Exam   Temp: 98.2  F (36.8  C) Temp src: Oral BP: 103/48 Pulse: 82   Resp: 14 SpO2: 95 % O2 Device: Nasal cannula Oxygen Delivery: 3 LPM  Vitals:    11/26/23 1654 11/27/23 0624   Weight: 67 kg (147 lb 11.3 oz) 68 kg (149 lb 14.6 oz)     Vital Signs with Ranges  Temp:  [98.2  F (36.8  C)] 98.2  F (36.8  C)  Pulse:  [] 82  Resp:  [14-18] 14  BP:  (100-144)/(48-62) 103/48  SpO2:  [95 %-100 %] 95 %    Constitutional: Awake, alert, cooperative, no apparent distress  Lungs: Clear to auscultation bilaterally, no crackles or wheezing  Cardiovascular: Regular rate and rhythm, normal S1 and S2, and no murmur noted  Abdomen: Normal bowel sounds, soft, non-distended, non-tender  Skin: No rashes, no cyanosis, no edema  Other:    Medications    - MEDICATION INSTRUCTIONS -        apixaban ANTICOAGULANT  10 mg Oral BID    Followed by    [START ON 12/4/2023] apixaban ANTICOAGULANT  5 mg Oral BID    atorvastatin  10 mg Oral Daily    famotidine  40 mg Oral BID    fludrocortisone  0.1 mg Oral Daily    fluticasone-vilanterol  1 puff Inhalation Daily    gabapentin  600 mg Oral At Bedtime    sodium chloride (PF)  3 mL Intracatheter Q8H    sodium chloride (PF)  3 mL Intracatheter Q8H    tamsulosin  0.4 mg Oral Daily    umeclidinium  1 puff Inhalation Daily    vancomycin  125 mg Oral 4x Daily       Data   All microbiology laboratory data reviewed.  Recent Labs   Lab Test 11/27/23  1324 11/25/23  2302 10/26/23  1733   WBC 9.6 11.3* 5.7   HGB 10.7* 12.0* 10.8*   HCT 34.5* 38.9* 35.9*   MCV 84 84 86   * 150 169     Recent Labs   Lab Test 11/27/23  0500 11/25/23  2301 10/26/23  1733   CR 1.18* 1.36* 1.36*     Recent Labs   Lab Test 10/06/22  1526   SED 43*

## 2023-11-28 NOTE — PROGRESS NOTES
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   History of recent C. difficile, admitted with recurrent diarrhea and hypoxia  -C. difficile again positive  -Possible pneumonia RLL  -Likely  Recurrent lung cancer in left lower lobe  -On PET scan from September-known multiple lung nodules disease recurrence as well as FDG avid lesion base of the tongue  -Small PE LLL   -History of atrial fibrillation (not on anticoagulation)  -Aortic stenosis  -Peripheral neuropathy affecting the feet-stable  -COPD-on oxygen at night as outpatient  - FULL CODE     PLAN:  - Lung nodules were previously too small to biopsy ( also present on PET from Sept) . Now that they are increased in size to 1cm we will revisit this with interventional radiology in the outpatient setting. For now we should continue to treat his C diff colitis and we can arrange for biopsy in a few weeks. He is not a candidate for curative surgery   - Continue Eliquis for the time being  - I will arrange follow up with him in mid December   - ID managing C diff   -FDG avid lesion base of the tongue noted on PET scan from September.  Patient missed his ENT consult as outpatient. Will need to be rearranged      Patrick Dreyer, DO  [facility]   Office: [phone]         Interval History:   6 diarrheas today per the patient. He does not remember who I am today.          Physical Exam:     Vitals:    11/26/23 1654 11/27/23 0624   Weight: 67 kg (147 lb 11.3 oz) 68 kg (149 lb 14.6 oz)     Vital Signs with Ranges  Temp:  [98.2  F (36.8  C)-98.4  F (36.9  C)] 98.4  F (36.9  C)  Pulse:  [74-82]  74  Resp:  [14-16] 16  BP: (103-144)/(48-62) 129/59  SpO2:  [93 %-98 %] 93 %      Data   Results for orders placed or performed during the hospital encounter of 11/25/23 (from the past 24 hour(s))   Potassium   Result Value Ref Range    Potassium 3.5 3.4 - 5.3 mmol/L   Magnesium   Result Value Ref Range    Magnesium 2.1 1.7 - 2.3 mg/dL   Potassium   Result Value Ref Range    Potassium 3.6 3.4 - 5.3 mmol/L     *Note: Due to a large number of results and/or encounters for the requested time period, some results have not been displayed. A complete set of results can be found in Results Review.

## 2023-11-29 NOTE — PROGRESS NOTES
"   11/29/23 1400   Appointment Info   Signing Clinician's Name / Credentials (OT) Mali Livingston, OTR/L   Living Environment   People in Home spouse   Current Living Arrangements house   Home Accessibility stairs to enter home;stairs within home   Number of Stairs, Main Entrance 2   Stair Railings, Main Entrance railings safe and in good condition   Transportation Anticipated family or friend will provide   Living Environment Comments Pt is a poor historian, per chart review, pt lives with his spouse in a house with 2 PATI.   Self-Care   Usual Activity Tolerance moderate   Current Activity Tolerance fair   Equipment Currently Used at Home cane, straight;walker, rolling   Fall history within last six months no   Activity/Exercise/Self-Care Comment Pt reports ambulating w/ FWW at home, per chart review pt uses a SPC and FWW at home from most recent hospitalization but is able to complete most ADL's IND   General Information   Onset of Illness/Injury or Date of Surgery 11/25/23   Referring Physician Bhatti, Raj Silverman MD   Patient/Family Therapy Goal Statement (OT) Not stated   Additional Occupational Profile Info/Pertinent History of Current Problem Per chart: \"Hermilo Velasquez is a 89 year old male admitted on 11/25/2023. He presents to the emergency department for fever at home as well as apparently diarrhea.  Found to have left lower lobe pulmonary emboli, proctocolitis, concern for local recurrence and metastatic nodules from prior adenocarcinoma resection.\"   Existing Precautions/Restrictions fall;oxygen therapy device and L/min   Limitations/Impairments safety/cognitive   Cognitive Status Examination   Orientation Status person  (Not oriented to place, nor time)   Affect/Mental Status (Cognitive) confused   Follows Commands follows one-step commands;25-49% accuracy;increased processing time needed;physical/tactile prompts required;verbal cues/prompting required;repetition of directions required   Cognitive Status " Comments Per thorough chart review - Pt participated in SLUMS in Septemer 2022 scoring 6/30 and once again in September 2023 scoring 7/30.   Visual Perception   Visual Impairment/Limitations WFL   Impact of Vision Impairment on Function (Vision) Wears glasses, unclear if pt wear them full time   Sensory   Sensory Comments Pt does not report numbness/tingling   Range of Motion Comprehensive   Comment, General Range of Motion BUEs WFL, limited B shoulder flexion   Strength Comprehensive (MMT)   Comment, General Manual Muscle Testing (MMT) Assessment BUEs WFL, global weakness   Coordination   Upper Extremity Coordination No deficits were identified   Bed Mobility   Comment (Bed Mobility) Sitting EOB > supine with CGA   Transfers   Transfers sit-stand transfer;toilet transfer   Sit-Stand Transfer   Sit/Stand Transfer Comments CGA   Toilet Transfer   Toilet Transfer Comments Min A per clinical judgement   Balance   Balance Comments Mild unsteadiness noted with use of FWW   Activities of Daily Living   BADL Assessment/Intervention lower body dressing;toileting;grooming   Lower Body Dressing Assessment/Training   Comment, (Lower Body Dressing) SBA B socks   Grooming Assessment/Training   Comment, (Grooming) CGA per clinical judgement   Toileting   Comment, (Toileting) Min A per clinical judgement   Clinical Impression   Criteria for Skilled Therapeutic Interventions Met (OT) Yes, treatment indicated   OT Diagnosis Decreased ind with I/ADLs   OT Problem List-Impairments impacting ADL problems related to;activity tolerance impaired;cognition;balance;mobility;strength   Assessment of Occupational Performance 3-5 Performance Deficits   Identified Performance Deficits dressing, bathing, toileting, higher level cog tasks and IADLs   Planned Therapy Interventions (OT) ADL retraining;IADL retraining;transfer training;cognition   Clinical Decision Making Complexity (OT) problem focused assessment/low complexity   Risk & Benefits of  "therapy have been explained patient   OT Total Evaluation Time   OT Eval, Low Complexity Minutes (55955) 10   OT Goals   Therapy Frequency (OT) 5 times/week   OT Predicted Duration/Target Date for Goal Attainment 12/14/23   OT Goals Hygiene/Grooming;Upper Body Dressing;Lower Body Dressing;Toilet Transfer/Toileting;Cognition;OT Goal 1   OT: Hygiene/Grooming modified independent;while standing  (FWW)   OT: Upper Body Dressing Modified independent   OT: Lower Body Dressing Modified independent  (FWW)   OT: Toilet Transfer/Toileting Modified independent;cleaning and garment management;toilet transfer  (FWW)   OT: Cognitive Patient/caregiver will verbalize understanding of cognitive assessment results/recommendations as needed for safe discharge planning   OT: Goal 1 Pt will follow simple commands at least 75% of the time for increased safety/ind with ADLs.   Therapeutic Activities   Therapeutic Activity Minutes (64653) 23   Symptoms noted during/after treatment fatigue   Treatment Detail/Skilled Intervention Pt greeted in chair, pleasant and agreeable to OT. Pt oriented to self only, intermittently following simple commands throughout session. Pt on RA at beginning of session, at end of session SpO2 reading 80% on RA, donned NC and placed on 2L O2, pt's SpO2 reading 93% at end of session on 2L O2 via NC. Pt able to doff/don B socks with SBA sitting in chair with Min VCs to initiate task. Pt stands from chair and mobilizing to BR with CGA and FWW, Mod VCs and point cues to demo toilet transfer, pt declining stating \"I don't have to go\". Pt then agreeable to ambulate in [facility] of [facility] - pt ambulates approx. 30 ft to window with CGA and FWW, Mod VCs for repeated instruction. Pt able to ind identify 3 objects outside window, Mod VCs to indicate 2 more objects outside - standing at window with CGA and FWW. Pt ambulates another 70 ft with CGA and FWW, Mod VCs for direction. Pt able to locate room with direct VC. Pt ambulates to " EOB and sits EOB with CGA and FWW. Pt demo sitting EOB >> supine with CGA for safety, pt able to clear BLEs onto bed. Pt supine in bed with needs met, sitter present, items in reach, alarm set and RN updated.   OT Discharge Planning   OT Plan Command follow, g/h sinkside, LB dressing   OT Discharge Recommendation (DC Rec) Transitional Care Facility   OT Rationale for DC Rec Pt functioning below baseline with noted cog impairment, balance and strength deficits, decreased activity yoli, impacting safety/ind. Pt resides at home with spouse and 2 PATI. Per chart, pt scored 7/30 on SLUMS in Sept. 2023. Pt with recent hospital admissions. Pt currently A x 1 for functional mobility and requires increased A  with self cares. Rec skilled TCU for increased I/ADL ind. Pt would likely benefit from higher level of care in least restrictive environment such as an prison. OT will continue to follow and update d/c recs as appropriate.   OT Brief overview of current status See above   OT Equipment Needed at Discharge tub bench;gait belt   Total Session Time   Timed Code Treatment Minutes 23   Total Session Time (sum of timed and untimed services) 33

## 2023-11-29 NOTE — PROGRESS NOTES
[facility]    Medicine Progress Note - Hospitalist Service    Date of Admission:  11/25/2023    Assessment & Plan   Hermilo Velasquez is a 89 year old male with h/o recurrent C. difficile colitis, adenocarcinoma of the right lung, COPD/emphysema with chronic hypoxic respiratory failure, on 2L of oxygen, h/o DVT/PE, paroxysmal atrial fibrillation, hypertension, CKD stage III, dyslipidemia, BPH, non-Hodgkin lymphoma, GERD, who presented on 11/25/2023 due to fever of 104 at home, increased confusion, diarrhea.  Work-up showed recurrent C. difficile proctocolitis, left lower lobe PE, probable pneumonia vs recurrence of lung cancer.      He was diagnosed with C. difficile in July 2023 and treated with 10 days of oral vancomycin.  Had recurrence in August and was treated with a prolonged vancomycin taper that he did not complete.  He was admitted in September due to recurrent C. difficile and treated with a prolonged vancomycin taper.  He was referred to [facility] at discharge for fecal microbial transplant.  This has not happened yet.    CT PE/A/P 11/25/2023 -   1.  Few scattered small filling defects in the subsegmental pulmonary arteries most prominent of the left lower lobe, likely represents small pulmonary emboli.  2.  Medial right lower lobe patchy consolidative pulmonary opacities, along the right lower lobe wedge resection, worrisome for local recurrence versus less likely infection, can be further evaluated with dedicated PET/CT or tissue sampling.  3.  Multiple pulmonary nodules most prominent in the left lower lobe, slightly increased in size as compared to 09/11/2023 exam, likely metastatic.  4.  Small to moderate right pleural effusion.  5.  Mild distal colonic and rectal wall thickening, nonspecific, can be seen with acute proctocolitis.  6.  Mildly enlarged heterogenous appearance of the prostate gland, of indeterminate etiology, recommend correlation with serum PSA.    Recurrent C.  difficile procto-colitis, with sepsis  - had fever of 104 at home, 102.4 in ER. With fever, , RR 24, WBC 11.3 -met criteria for sepsis.  Lactate was normal   - CT scan showed Mild distal colonic and rectal wall thickening   - Tested positive for C. Difficile - PCR, GDH and toxin - all positive on 11/25  - ID consulted. On p.o. vancomycin, continue.  Will need prolonged taper  - Needs to follow-up with [facility] for fecal microbiota transplantation.  Referral was made at discharge in September and again by PCP in October.  Per patient's wife, she has not heard from [facility].  Another order placed so appointment can be set up prior to discharge    Subsegmental left lower lobe pulmonary embolism, 11/25/2023  - Noted on CT PE on 11/25  - Oxygen requirement stable at baseline.  Currently on 3 L/min  - Was initially on IV heparin, transitioned to apixaban on 11/27, continue.      Probable right lower lobe pneumonia - suspected on admission, now ruled out  - patchy consolidative opacities in RLL along previous wedge resection noted on CT, concerning for local recurrence of adenocarcinoma.  Pneumonia was in differential and he was started on IV cefepime + azithromycin on admission.  Infectious disease consulted.  He had no new respiratory symptoms.  Fever and sepsis felt to be due to C. difficile colitis.  IV antibiotics discontinued on 11/27  - Monitor off antibiotics    Acute metabolic encephalopathy  - Multifactorial due to sepsis and delirium.  Likely has underlying cognitive impairment as well.    - Was quite somnolent on 11/28.  Mental status improved on 11/29.  More awake and alert though still confused.  Safety sitter in room  - Continue supportive care.  Treat infection as above    Hypokalemia, potassium 3.1  - Due to GI loss  - Replaced     Adenocarcinoma of the right lung, diagnosed 2019, s/p wedge resection  Right lower lobe consolidation-concerning for recurrence versus pneumonia  Bilateral pulmonary  nodules  Moderate right pleural effusion  - follows with [facility].  Was diagnosed with stage I adenocarcinoma of right lower lobe in 2019 and is s/p limited thoracotomy with wedge resection in March 2019.    - CT chest on 9/11 - Multiple new pulmonary nodules. 1.8 cm nodule in the RLL WAS concerning for recurrent malignancy. Multiple bilateral new pulmonary nodules were also concerning for metastatic disease   - PET scan 9/22 showed moderate FDG activity in the left seventh rib anterolaterally, nonspecific and could represent a lymphoma/myeloma vs the sequelae of trauma. Small LLL pulmonary nodules too small to characterize.  FDG activity in the right lower lobe near the wedge resection, most likely inflammation.  Continued CT or PET/CT surveillance recommended. FDG avid soft tissue nodule in the right tongue base may represent a synchronous squamous cell carcinoma.  Plan was to repeat CT chest in 2 months and refer to ENT.  Has not been seen by ENT yet.   - CT PE on 11/25 showed RLL patchy consolidative pulmonary opacities, worrisome for local recurrence vs infection, Multiple pulmonary nodules most prominent in the left lower lobe, slightly increased in size as compared to 09/11/2023, moderate right pleural effusio  np with PLAN_ no current left rib pain.  Plan follow up chest CT in about two months to assess - Hem onc consulted.  Lung nodules were previously too small to biopsy.  They have now increased to 1 cm in size and oncology will revisit this with interventional radiology in outpatient setting. Patient and wife would like treatment if found to have recurrent disease.      FDG avid soft tissue nodule in right tongue base - concerning for squamous cell carcinoma  - Heme-onc had made referral to ENT as outpatient.  Continue to follow-up with ENT      COPD with emphysema  Chronic hypoxic respiratory failure on oxygen 2 L  - Respiratory status stable and at baseline.  Continue supplemental oxygen 2-3  L/min  - Continue Breo inhaler and Incruse     Severe aortic stenosis, s/p bioprosthetic aortic valve replacement  Dyslipidemia  - Continue atorvastatin     Chronically elevated troponin due to chronic myocardial injury  - mildly elevated troponins of 62, 59 on admission.  These are stable/slightly decreased from September and likely represent chronic myocardial injury.   - Echo 9/13 showed atrial fibrillation, bioprosthetic aortic valve well-seated without signs of periprosthetic regurgitation.  Normal LVEF of 55-60%, septal motion consistent with conduction abnormality.  RV not well visualized, appears moderately dilated with mildly decreased function trace mitral and tricuspid regurgitation.  No significant change compared to 11/2022    Chronic paroxysmal atrial fibrillation  - Not on anticoagulation at baseline.  Now started on apixaban for PE      CKD stage 3  - Baseline creatinine around 1.2-1.4. Creatinine 1.36 on admission, improved to 1.18 on 11/27     GERD-continue Pepcid     BPH  - CT scan showed enlarged prostate.  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  Had peripheral neuropathy.  Treated with rituximab, completed in 2012  - Most recent labs were stable and recheck is planned for next month.  Heme-onc did not recommend any further testing at this time.    - Continue gabapentin     Tobacco use disorder  - Patient continues to smoke at least 3 cigarettes/day  - Smoking cessation is strongly advised    Generalized weakness  - Multifactorial due to acute illness, physical deconditioning  - PT/OT evaluation  -  consult for discharge planning               Diet: Advance Diet as Tolerated: Regular Diet Adult    DVT Prophylaxis: DOAC  Suazo Catheter: Not present  Lines: None     Cardiac Monitoring: None  Code Status: Full Code      Clinically Significant Risk Factors        # Hypokalemia: Lowest K =  3.3 mmol/L in last 2 days, will replace as needed         # Thrombocytopenia: Lowest platelets = 117 in last 2 days, will monitor for bleeding   # Hypertension: Noted on problem list    # Chronic heart failure with preserved ejection fraction: heart failure noted on problem list and last echo with EF >50%           # COPD: noted on problem list        Disposition Plan      Expected Discharge Date: 11/30/2023                    Glenny Kelly MD  Hospitalist Service  [facility]  Securely message with [employer] (more info)  Text page via [employer] Paging/Directory   ______________________________________________________________________    Interval History   Patient sitting in chair.  More awake and alert than yesterday.  Wondering when he can go home.  Safety sitter present in room.  Remains afebrile.  Vital signs are stable.  Denies abdominal pain.  Diarrhea seems to be improving.  4 episodes in last 24 hours       Physical Exam   Vital Signs: Temp: 97.5  F (36.4  C) Temp src: Oral BP: 128/72 Pulse: 81   Resp: 18 SpO2: 93 % O2 Device: Nasal cannula Oxygen Delivery: 3 LPM  Weight: 149 lbs 14.6 oz    Constitutional - resting, in no acute distress  GI - abdomen is soft, nondistended, mild tenderness to palpation  Integumentary - skin is warm and dry, no rashes or ulcers  Neurological - Moving all 4 extremities, no focal deficits    Medical Decision Making       35 MINUTES SPENT BY ME on the date of service doing chart review, history, exam, documentation & further activities per the note.      Data     I have personally reviewed the following data over the past 24 hrs:    N/A  \   N/A   / N/A     N/A N/A N/A /  N/A   3.5 N/A N/A \       Imaging results reviewed over the past 24 hrs:   No results found for this or any previous visit (from the past 24 hour(s)).

## 2023-11-29 NOTE — PLAN OF CARE
Goal Outcome Evaluation:  Date/Time 11/28/23  19:30  Diagnosis: Pneumonia, c-diff  POD#: na  Mental Status: confused, alert to self  Activity/dangle up with one assist and walker  Diet: Regular  Pain: denied  Suazo/Voiding: incontinent  Tele/Restraints/Iso: Enteric  02/LDA: on 2L NC Hx COPD, PIV SL  D/C Date: 11/30/23 to TCU  Other Info: Sitter at the bed side, on K+ and Mg+ protocol

## 2023-11-29 NOTE — PROGRESS NOTES
Date/Time 11/29/23 [id number]     Trauma/Ortho/Medical (Choose one) Medical     Diagnosis: PE, Pneumonia, C-Diff,  POD#: NA  Mental Status: A&O x 2, confused   Activity/dangle up with assist of 1 GB and walker  Diet: Regular  Pain: denied  Suazo/Voiding: incontinent   Tele/Restraints/Iso: Enteric Precaution  02/LDA: on 2L NC. PIV SL  D/C Date: 11/30/23  Other Info: Sitter at bedside.

## 2023-11-29 NOTE — PROGRESS NOTES
[facility]ology Progress Note     Primary Oncologist/Hematologist:  Dr. 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   History of recent C. difficile, admitted with recurrent diarrhea and hypoxia  -C. difficile again positive  -Possible pneumonia RLL  -Likely  Recurrent lung cancer in left lower lobe  -On PET scan from September-known multiple lung nodules disease recurrence as well as FDG avid lesion base of the tongue  -Small PE LLL   -History of atrial fibrillation (not on anticoagulation)  -Aortic stenosis  -Peripheral neuropathy affecting the feet-stable  -COPD-on oxygen at night as outpatient  - FULL CODE     PLAN:  - Lung nodules were previously too small to biopsy ( also present on PET from Sept) . Now that they are increased in size to 1cm we will revisit this with interventional radiology in the outpatient setting. For now we should continue to treat his C diff colitis and we can arrange for biopsy in a few weeks. He is not a candidate for curative surgery   - Continue Eliquis for the time being  - We will arrange follow up with him in mid December   - ID managing C diff   -FDG avid lesion base of the tongue noted on PET scan from September.  Patient missed his ENT consult as outpatient. Will need to be rearranged    Pt has scheduled follow up with Dr. Dreyer on 12/11/23 at 11:10 am in our clinic. We would plan to keep that appointment if possible.     Santo Pizano, ILYA, AOCNP  Nurse Practitioner  [facility]  [phone]          Interval History:     Sleeping in the chair. Sitter at the bedside.  He awakens and answers my questions but is a bit mixed up.               Review of Systems:     The 5 point Review of  Systems is negative other than noted in the HPI                Medications:   Scheduled Medications   apixaban ANTICOAGULANT  10 mg Oral BID    Followed by    [START ON 12/4/2023] apixaban ANTICOAGULANT  5 mg Oral BID    atorvastatin  10 mg Oral Daily    famotidine  40 mg Oral BID    fludrocortisone  0.1 mg Oral Daily    fluticasone-vilanterol  1 puff Inhalation Daily    gabapentin  600 mg Oral At Bedtime    sodium chloride (PF)  3 mL Intracatheter Q8H    sodium chloride (PF)  3 mL Intracatheter Q8H    tamsulosin  0.4 mg Oral Daily    umeclidinium  1 puff Inhalation Daily    vancomycin  125 mg Oral 4x Daily     PRN Medications  acetaminophen **OR** acetaminophen, albuterol, bisacodyl, calcium carbonate, HYDROmorphone, HYDROmorphone, melatonin, naloxone **OR** naloxone **OR** naloxone **OR** naloxone, ondansetron **OR** ondansetron, oxyCODONE, oxyCODONE IR, - MEDICATION INSTRUCTIONS -, polyethylene glycol, prochlorperazine **OR** prochlorperazine **OR** prochlorperazine, senna-docusate **OR** senna-docusate, sodium chloride (PF), sodium chloride (PF)               Physical Exam:   Vitals were reviewed  Blood pressure 128/72, pulse 81, temperature 97.5  F (36.4  C), temperature source Oral, resp. rate 18, weight 68 kg (149 lb 14.6 oz), SpO2 93%.  Wt Readings from Last 4 Encounters:   11/27/23 68 kg (149 lb 14.6 oz)   10/26/23 66.7 kg (147 lb 1.6 oz)   10/19/23 67.6 kg (149 lb 1.6 oz)   09/29/23 69.4 kg (152 lb 14.4 oz)       I/O last 3 completed shifts:  In: 350 [P.O.:350]  Out: -                  Data:   All laboratory data and imaging studies reviewed.    CMP  Recent Labs   Lab 11/29/23  0936 11/28/23  1148 11/27/23  2329 11/27/23  1324 11/27/23  0500 11/26/23  0321 11/25/23  2301   NA  --   --   --   --  139  --  137   POTASSIUM 3.5 3.6 3.5 3.3* 3.2*  3.1*  --  3.9   CHLORIDE  --   --   --   --  105  --  101   CO2  --   --   --   --  22  --  28   ANIONGAP  --   --   --   --  12  --  8   GLC  --   --   --   --   108*  --  160*   BUN  --   --   --   --  17.3  --  18.8   CR  --   --   --   --  1.18*  --  1.36*   GFRESTIMATED  --   --   --   --  58*  --  49*   AMRITA  --   --   --   --  8.1*  --  8.8   MAG 2.0 2.1  --   --  1.9 1.7  --    PROTTOTAL  --   --   --   --   --   --  6.9   ALBUMIN  --   --   --   --   --   --  3.6   BILITOTAL  --   --   --   --   --   --  0.6   ALKPHOS  --   --   --   --   --   --  126   AST  --   --   --   --   --   --  12   ALT  --   --   --   --   --   --  7     CBC  Recent Labs   Lab 11/27/23  1324 11/25/23  2302   WBC 9.6 11.3*   RBC 4.11* 4.61   HGB 10.7* 12.0*   HCT 34.5* 38.9*   MCV 84 84   MCH 26.0* 26.0*   MCHC 31.0* 30.8*   RDW 15.2* 15.0   * 150     INRNo lab results found in last 7 days.        DEDRA Gonzalez  Nurse Practitioner  [facility]  [phone]

## 2023-11-29 NOTE — PROGRESS NOTES
[facility]    Infectious Disease Progress Note    Date of Service : 11/29/2023     Assessment:  89 YM with multiple medical conditions, hx of C.diff colitis in July with relapse in August, was to be on prolonged pulse oral vancomycin taper, but discontinued vancomycin as diarrhea improved, and more recently admitted with recurrent diarrhea, leukocytosis and hypotension related to C.diff relapse treated with long taper of oral vancomycin with last date being 11/15/23. Now presenting again with recurrent c.diff and possible pneumonia.      -Recurrent C.diff colitis with diarrhea, fever, and mild leukocytosis  -Small pulmonary emboli  -CT with concern for pulmonary opacities along RLL wedge resection site worrisome for local recurrence. Also has multiple pulmonary nodules increased in zize concerning for metastatic disease  -COPD and right lung adenocarcinoma with questionable recurrence of lung cancer in RLL per imaging this admission   -Small PE this admission, on IV heparin now.  -Atrial fibrillation  -FRED on Chronic kidney disease, resolved.  -Fevers down. WBC normalized.  -chronic medical conditions- hx of DVT/PE, atrial fibrillation, HTN, BPH, non Hodgkin lymphoma, GERD     Recommendations:  Treat c.diff with oral vancomycin.  Monitor off all other antibiotics  Would recommend that he stay on QID oral vancomycin until GI evaluation for FMT  Suspect CT chest abnormalities may be related to tumor recurrence and PE. No antibiotics in view of C.diff colitis for now.  ID will sign off. Please call with questions.       Patient and plan discussed with Dr. Moses.       Evi Anthony PA-C    Interval History   Better today, diarrhea is improving no new respiratory symptoms      Physical Exam   Temp: 97.5  F (36.4  C) Temp src: Oral BP: 128/72 Pulse: 81   Resp: 18 SpO2: 93 % O2 Device: Nasal cannula Oxygen Delivery: 3 LPM  Vitals:    11/26/23 1654 11/27/23 0624   Weight: 67 kg (147 lb 11.3 oz) 68 kg  (149 lb 14.6 oz)     Vital Signs with Ranges  Temp:  [97.4  F (36.3  C)-98.4  F (36.9  C)] 97.5  F (36.4  C)  Pulse:  [68-81] 81  Resp:  [16-18] 18  BP: (128-152)/(59-75) 128/72  SpO2:  [93 %-98 %] 93 %    Constitutional: Awake, alert, cooperative, no apparent distress  Lungs: Clear to auscultation bilaterally, no crackles or wheezing  Cardiovascular: Regular rate and rhythm, normal S1 and S2, and no murmur noted  Abdomen: Normal bowel sounds, soft, non-distended, non-tender  Skin: No rashes, no cyanosis, no edema    Medications    - MEDICATION INSTRUCTIONS -        apixaban ANTICOAGULANT  10 mg Oral BID    Followed by    [START ON 12/4/2023] apixaban ANTICOAGULANT  5 mg Oral BID    atorvastatin  10 mg Oral Daily    famotidine  40 mg Oral BID    fludrocortisone  0.1 mg Oral Daily    fluticasone-vilanterol  1 puff Inhalation Daily    gabapentin  600 mg Oral At Bedtime    sodium chloride (PF)  3 mL Intracatheter Q8H    sodium chloride (PF)  3 mL Intracatheter Q8H    tamsulosin  0.4 mg Oral Daily    umeclidinium  1 puff Inhalation Daily    vancomycin  125 mg Oral 4x Daily       Data   All microbiology laboratory data reviewed.  Recent Labs   Lab Test 11/27/23  1324 11/25/23  2302 10/26/23  1733   WBC 9.6 11.3* 5.7   HGB 10.7* 12.0* 10.8*   HCT 34.5* 38.9* 35.9*   MCV 84 84 86   * 150 169     Recent Labs   Lab Test 11/29/23  0936 11/27/23  0500 11/25/23  2301   CR 0.94 1.18* 1.36*     Recent Labs   Lab Test 10/06/22  1526   SED 43*

## 2023-11-30 NOTE — PROGRESS NOTES
Heme onc quick note    Same plan as day prior. Will arrange outpatient follow up. Continue treatment for C diff.     Patrick Dreyer, DO  [facility]

## 2023-11-30 NOTE — PROGRESS NOTES
[facility]    Medicine Progress Note - Hospitalist Service    Date of Admission:  11/25/2023    Assessment & Plan   Hermilo Velasquez is a 89 year old male with h/o recurrent C. difficile colitis, adenocarcinoma of the right lung, COPD/emphysema with chronic hypoxic respiratory failure, on 2L of oxygen, h/o DVT/PE, paroxysmal atrial fibrillation, hypertension, CKD stage III, dyslipidemia, BPH, non-Hodgkin lymphoma, GERD, who presented on 11/25/2023 due to fever of 104 at home, increased confusion, diarrhea.  Work-up showed recurrent C. difficile proctocolitis, left lower lobe PE, probable pneumonia vs recurrence of lung cancer.      He was diagnosed with C. difficile in July 2023 and treated with 10 days of oral vancomycin.  Had recurrence in August and was treated with a prolonged vancomycin taper that he did not complete.  He was admitted in September due to recurrent C. difficile and treated with a prolonged vancomycin taper.  He was referred to [facility] at discharge for fecal microbial transplant.  This has not happened yet.    CT PE/A/P 11/25/2023 -   1.  Few scattered small filling defects in the subsegmental pulmonary arteries most prominent of the left lower lobe, likely represents small pulmonary emboli.  2.  Medial right lower lobe patchy consolidative pulmonary opacities, along the right lower lobe wedge resection, worrisome for local recurrence versus less likely infection, can be further evaluated with dedicated PET/CT or tissue sampling.  3.  Multiple pulmonary nodules most prominent in the left lower lobe, slightly increased in size as compared to 09/11/2023 exam, likely metastatic.  4.  Small to moderate right pleural effusion.  5.  Mild distal colonic and rectal wall thickening, nonspecific, can be seen with acute proctocolitis.  6.  Mildly enlarged heterogenous appearance of the prostate gland, of indeterminate etiology, recommend correlation with serum PSA.    Recurrent C.  difficile procto-colitis, with sepsis  - had fever of 104 at home, 102.4 in ER. With fever, , RR 24, WBC 11.3 -met criteria for sepsis.  Lactate was normal   - CT scan showed Mild distal colonic and rectal wall thickening   - Tested positive for C. Difficile - PCR, GDH and toxin - all positive on 11/25  - ID consulted. On p.o. vancomycin, continue.  Have recommended to keep on 4 times daily dosing until further GI evaluation for FMT  - Needs to follow-up with [facility] for fecal microbiota transplantation.  Referral was made at discharge in September and again by PCP in October.  Per patient's wife, she has not heard from [facility].  Another order placed so appointment can be set up prior to discharge    Subsegmental left lower lobe pulmonary embolism, 11/25/2023  - Noted on CT PE on 11/25  - Oxygen requirement stable at baseline.  Currently on 3 L/min  - Was initially on IV heparin, transitioned to apixaban on 11/27, continue.      Probable right lower lobe pneumonia - suspected on admission, now ruled out  - patchy consolidative opacities in RLL along previous wedge resection noted on CT, concerning for local recurrence of adenocarcinoma.  Pneumonia was in differential and he was started on IV cefepime + azithromycin on admission.  Infectious disease consulted.  He had no new respiratory symptoms.  Fever and sepsis felt to be due to C. difficile colitis.  IV antibiotics discontinued on 11/27  - Monitor off antibiotics    Acute metabolic encephalopathy  - Multifactorial due to sepsis and delirium.  Likely has underlying cognitive impairment as well.    - Was quite somnolent on 11/28.  Mental status improved on 11/29.  More awake and alert on 11/30.  Safety sitter discontinued on 11/29.    - Continue supportive care.  Treat infection as above    Hypokalemia, potassium 3.1  - Due to GI loss  - Replace.  Also start on scheduled p.o. KCl     Adenocarcinoma of the right lung, diagnosed 2019, s/p wedge resection  Right  lower lobe consolidation-concerning for recurrence versus pneumonia  Bilateral pulmonary nodules  Moderate right pleural effusion  - follows with [facility].  Was diagnosed with stage I adenocarcinoma of right lower lobe in 2019 and is s/p limited thoracotomy with wedge resection in March 2019.    - CT chest on 9/11 - Multiple new pulmonary nodules. 1.8 cm nodule in the RLL WAS concerning for recurrent malignancy. Multiple bilateral new pulmonary nodules were also concerning for metastatic disease   - PET scan 9/22 showed moderate FDG activity in the left seventh rib anterolaterally, nonspecific and could represent a lymphoma/myeloma vs the sequelae of trauma. Small LLL pulmonary nodules too small to characterize.  FDG activity in the right lower lobe near the wedge resection, most likely inflammation.  Continued CT or PET/CT surveillance recommended. FDG avid soft tissue nodule in the right tongue base may represent a synchronous squamous cell carcinoma.  Plan was to repeat CT chest in 2 months and refer to ENT.  Has not been seen by ENT yet.   - CT PE on 11/25 showed RLL patchy consolidative pulmonary opacities, worrisome for local recurrence vs infection, Multiple pulmonary nodules most prominent in the left lower lobe, slightly increased in size as compared to 09/11/2023, moderate right pleural effusio  np with PLAN_ no current left rib pain.  Plan follow up chest CT in about two months to assess - Hem onc consulted.  Lung nodules were previously too small to biopsy.  They have now increased to 1 cm in size and oncology will revisit this with interventional radiology in outpatient setting. Patient and wife would like treatment if found to have recurrent disease.      FDG avid soft tissue nodule in right tongue base - concerning for squamous cell carcinoma  - Heme-onc had made referral to ENT as outpatient.  Continue to follow-up with ENT      COPD with emphysema  Chronic hypoxic respiratory failure on  oxygen 2 L  - Respiratory status stable and at baseline.  Continue supplemental oxygen 2-3 L/min  - Continue Breo inhaler and Incruse     Severe aortic stenosis, s/p bioprosthetic aortic valve replacement  Dyslipidemia  - Continue atorvastatin     Chronically elevated troponin due to chronic myocardial injury  - mildly elevated troponins of 62, 59 on admission.  These are stable/slightly decreased from September and likely represent chronic myocardial injury.   - Echo 9/13 showed atrial fibrillation, bioprosthetic aortic valve well-seated without signs of periprosthetic regurgitation.  Normal LVEF of 55-60%, septal motion consistent with conduction abnormality.  RV not well visualized, appears moderately dilated with mildly decreased function trace mitral and tricuspid regurgitation.  No significant change compared to 11/2022    Chronic paroxysmal atrial fibrillation  - Not on anticoagulation at baseline.  Now started on apixaban for PE      CKD stage 3  - Baseline creatinine around 1.2-1.4. Creatinine 1.36 on admission, improved to 1.18 on 11/27     GERD-continue Pepcid     BPH  - CT scan showed enlarged prostate.  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  Had peripheral neuropathy.  Treated with rituximab, completed in 2012  - Most recent labs were stable and recheck is planned for next month.  Heme-onc did not recommend any further testing at this time.    - Continue gabapentin     Tobacco use disorder  - Patient continues to smoke at least 3 cigarettes/day  - Smoking cessation is strongly advised    Generalized weakness  - Multifactorial due to acute illness, physical deconditioning  - PT/OT consulted.  TCU recommended.  Patient and wife want to return home with outpatient PT   -  consulted for discharge planning               Diet: Advance Diet as Tolerated: Regular Diet Adult    DVT Prophylaxis:  DOAC  Suazo Catheter: Not present  Lines: None     Cardiac Monitoring: None  Code Status: Full Code      Clinically Significant Risk Factors        # Hypokalemia: Lowest K = 3.3 mmol/L in last 2 days, will replace as needed         # Thrombocytopenia: Lowest platelets = 122 in last 2 days, will monitor for bleeding   # Hypertension: Noted on problem list    # Chronic heart failure with preserved ejection fraction: heart failure noted on problem list and last echo with EF >50%           # COPD: noted on problem list        Disposition Plan      Expected Discharge Date: 12/01/2023                    Glenny Kelly MD  Hospitalist Service  [facility]  Securely message with [employer] (more info)  Text page via [employer] Paging/Directory   ______________________________________________________________________    Interval History   Patient sitting in chair.  Awake and alert.  Reports he is feeling well.  Reports diarrhea is improving.  Continues to have 4 episodes per day.  Reports his strength is improved.  Discussed with patient's wife.  She wants to bring him home at discharge.   Oxygen requirements stable      Physical Exam   Vital Signs: Temp: 98  F (36.7  C) Temp src: Oral BP: (!) 141/77 Pulse: 92   Resp: 18 SpO2: 95 % O2 Device: Nasal cannula Oxygen Delivery: 3 LPM  Weight: 140 lbs 10.46 oz    Constitutional - resting, in no acute distress  GI - abdomen is soft, nondistended, mild tenderness to palpation  Integumentary - skin is warm and dry, no rashes or ulcers  Neurological - Moving all 4 extremities, no focal deficits    Medical Decision Making       35 MINUTES SPENT BY ME on the date of service doing chart review, history, exam, documentation & further activities per the note.      Data     I have personally reviewed the following data over the past 24 hrs:    6.8  \   10.4 (L)   / 122 (L)     N/A N/A N/A /  N/A   3.3 (L) N/A N/A \       Imaging results reviewed over the past 24 hrs:   No  results found for this or any previous visit (from the past 24 hour(s)).

## 2023-11-30 NOTE — PLAN OF CARE
Goal Outcome Evaluation:       Pt A&Ox2, disoriented to place and situation, intermittent confusion w/ forgetfulness. VSS on 3L O2 via NC. Regular diet. Up 1A GB&W, voiding in BR. X1 Loose stools this shift.  Denies pain/SOB. L PIV SL. K+ and Mag replacement protocol, rechecks this AM. Enteric precautions maintained. Heme/Onc and ID following. Discharge pending TCU placement. Continue plan of care.

## 2023-11-30 NOTE — PLAN OF CARE
Goal Outcome Evaluation:  Date/Time 11/29/23  19:30  Diagnosis: Pneumonia, c-diff  POD#:n/a  Mental Status: A&Ox3, confused to situations  Activity/dangle up with one assist and walker  Diet: Regular  Pain: denied  Suazo/Voiding: Voiding in b.r  Tele/Restraints/Iso: Enteric  02/LDA: on 3L of O2 for Hx of COPD, PIV SL  D/C Date: 11/30/23 to TCU/home  Other Info: Sitter free at 1500 today, on K+ and Mg+ protocol.

## 2023-11-30 NOTE — PROGRESS NOTES
"Care Management Follow Up    Length of Stay (days): 4    Expected Discharge Date: 12/01/2023     Concerns to be Addressed:       Patient plan of care discussed at interdisciplinary rounds: Yes    Anticipated Discharge Disposition:       Anticipated Discharge Services:    Anticipated Discharge DME:      Patient/family educated on Medicare website which has current facility and service quality ratings:    Education Provided on the Discharge Plan:    Patient/Family in Agreement with the Plan:      Referrals Placed by CM/SW:    Private pay costs discussed:  NA    Additional Information:  Writer called patient's wife Roberta to discuss discharge planning as patient had sitter removed yesterday. Roberta plans to bring patient home. She stated that she can provide the Ax1 that is being recommended. She said they have the oxygen supplies at home and she would like outpatient therapy ordered as home therapy \"doesn't do him any good\". Writer messaged Dr. Kelly to see update on Roberta's wishes and to see when he is ready for discharge. Roberta will pick him up at discharge.    SY Ramsey      "

## 2023-11-30 NOTE — PLAN OF CARE
Goal Outcome Evaluation:  Trauma/Ortho/Medical (Choose one)  medical  Diagnosis: c-diff/diarrhea, PE, pneumonia   Mental Status: A/Ox2 disoriented to situation and time  Activity/dangle SBA/GB/walker  Diet: regular  Pain: denies  Suazo/Voiding: incontinent at times - bathroom  Tele/Restraints/Iso: enteric ISO  02/LDA: 2-3L O2 - patient uses home O2 at baseline/SL  D/C Date: 12/1 home with spouse  Other Info: potassium replaced

## 2023-12-01 NOTE — DISCHARGE SUMMARY
[facility]  Hospitalist Discharge Summary      Date of Admission:  11/25/2023  Date of Discharge:  12/1/2023  Discharging Provider: Glenny Kelly MD  Discharge Service: Hospitalist Service    Discharge Diagnoses     Recurrent C. difficile procto-colitis, with sepsis     Subsegmental left lower lobe pulmonary embolism, 11/25/2023     Acute metabolic encephalopathy     Hypokalemia, potassium 3.1     Adenocarcinoma of the right lung, diagnosed 2019, s/p wedge resection  Right lower lobe consolidation-concerning for recurrence versus pneumonia  Bilateral pulmonary nodules  Moderate right pleural effusion     FDG avid soft tissue nodule in right tongue base - concerning for squamous cell carcinoma     COPD with emphysema  Chronic hypoxic respiratory failure on oxygen 2 L  Tobacco use disorder     Severe aortic stenosis, s/p bioprosthetic aortic valve replacement  Dyslipidemia  Chronic paroxysmal atrial fibrillation   Chronically elevated troponin due to chronic myocardial injury      CKD stage 3     GERD   BPH  Generalized weakness     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  No follow up labs or test are needed.        {Additional follow-up instructions/to-do's for PCP    :    Unresulted Labs Ordered in the Past 30 Days of this Admission       No orders found from 10/26/2023 to 11/26/2023.            Discharge Disposition   Discharged to home  Condition at discharge: Stable    Hospital Course     Hermilo Velasquez is a 89 year old male with h/o recurrent C. difficile colitis, adenocarcinoma of the right lung, COPD/emphysema with chronic hypoxic respiratory failure, on 2L of oxygen, h/o DVT/PE,  paroxysmal atrial fibrillation, hypertension, CKD stage III, dyslipidemia, BPH, non-Hodgkin lymphoma, GERD, who presented on 11/25/2023 due to fever of 104 at home, increased confusion, diarrhea.  Work-up showed recurrent C. difficile proctocolitis, left lower lobe PE, probable pneumonia vs recurrence of lung cancer.      He was diagnosed with C. difficile in July 2023 and treated with 10 days of oral vancomycin.  Had recurrence in August and was treated with a prolonged vancomycin taper that he did not complete.  He was admitted in September due to recurrent C. difficile and treated with a prolonged vancomycin taper.  He was referred to [facility] at discharge for fecal microbial transplant.  This has not happened yet.     CT PE/A/P 11/25/2023 -   1.  Few scattered small filling defects in the subsegmental pulmonary arteries most prominent of the left lower lobe, likely represents small pulmonary emboli.  2.  Medial right lower lobe patchy consolidative pulmonary opacities, along the right lower lobe wedge resection, worrisome for local recurrence versus less likely infection, can be further evaluated with dedicated PET/CT or tissue sampling.  3.  Multiple pulmonary nodules most prominent in the left lower lobe, slightly increased in size as compared to 09/11/2023 exam, likely metastatic.  4.  Small to moderate right pleural effusion.  5.  Mild distal colonic and rectal wall thickening, nonspecific, can be seen with acute proctocolitis.  6.  Mildly enlarged heterogenous appearance of the prostate gland, of indeterminate etiology, recommend correlation with serum PSA.     Recurrent C. difficile procto-colitis, with sepsis  - had fever of 104 at home, 102.4 in ER. With fever, , RR 24, WBC 11.3 -met criteria for sepsis.  Lactate was normal   - CT scan showed Mild distal colonic and rectal wall thickening   - Tested positive for C. Difficile - PCR, GDH and toxin - all positive on 11/25  - ID consulted. On p.o.  vancomycin, continue.  Have recommended to keep on 4 times daily dosing until further GI evaluation for FMT  - Needs to follow-up with [facility] for fecal microbiota transplantation.  Referral was made at discharge in September and again by PCP in October.  Per patient's wife, she has not heard from [facility].  Another order placed and discussed with care coordinator to schedule appointment prior to discharge     Subsegmental left lower lobe pulmonary embolism, 11/25/2023  - Noted on CT PE on 11/25  - Oxygen requirement stable at baseline.  Currently on 3 L/min  - Was initially on IV heparin, transitioned to apixaban on 11/27, continue.       Probable right lower lobe pneumonia - suspected on admission, now ruled out  - patchy consolidative opacities in RLL along previous wedge resection noted on CT, concerning for local recurrence of adenocarcinoma.  Pneumonia was in differential and he was started on IV cefepime + azithromycin on admission.  Infectious disease consulted.  He had no new respiratory symptoms.  Fever and sepsis felt to be due to C. difficile colitis.  IV antibiotics discontinued on 11/27.  Monitor clinically off antibiotics, no new respiratory symptoms or fever     Acute metabolic encephalopathy  - Multifactorial due to sepsis and delirium.  Likely has underlying cognitive impairment as well.    - Was quite somnolent on 11/28.  Mental status improved on 11/29 and has returned back to baseline.  Encephalopathy has resolved       Hypokalemia, potassium 3.1  - Due to GI loss  - Replaced.  Discharged on p.o. KCl 10 mEq twice daily for 7 days     Adenocarcinoma of the right lung, diagnosed 2019, s/p wedge resection  Right lower lobe consolidation-concerning for recurrence versus pneumonia  Bilateral pulmonary nodules  Moderate right pleural effusion  - follows with [place] [facility].  Was diagnosed with stage I adenocarcinoma of right lower lobe in 2019 and is s/p limited thoracotomy with wedge resection in  March 2019.    - CT chest on 9/11 - Multiple new pulmonary nodules. 1.8 cm nodule in the RLL WAS concerning for recurrent malignancy. Multiple bilateral new pulmonary nodules were also concerning for metastatic disease   - PET scan 9/22 showed moderate FDG activity in the left seventh rib anterolaterally, nonspecific and could represent a lymphoma/myeloma vs the sequelae of trauma. Small LLL pulmonary nodules too small to characterize.  FDG activity in the right lower lobe near the wedge resection, most likely inflammation.  Continued CT or PET/CT surveillance recommended. FDG avid soft tissue nodule in the right tongue base may represent a synchronous squamous cell carcinoma.  Plan was to repeat CT chest in 2 months and refer to ENT.  Has not been seen by ENT yet.   - CT PE on 11/25 showed RLL patchy consolidative pulmonary opacities, worrisome for local recurrence vs infection, Multiple pulmonary nodules most prominent in the left lower lobe, slightly increased in size as compared to 09/11/2023, moderate right pleural effusio  np with PLAN_ no current left rib pain.  Plan follow up chest CT in about two months to assess - Hem onc consulted.  Lung nodules were previously too small to biopsy.  They have now increased to 1 cm in size and oncology will revisit this with interventional radiology in outpatient setting. Patient and wife would like treatment if found to have recurrent disease.       FDG avid soft tissue nodule in right tongue base - concerning for squamous cell carcinoma  - Heme-onc had made referral to ENT as outpatient.  Continue to follow-up with ENT      COPD with emphysema  Chronic hypoxic respiratory failure on oxygen 2 L  - Respiratory status stable and at baseline.  Continue supplemental oxygen 2-3 L/min  - Continue home inhaler     Severe aortic stenosis, s/p bioprosthetic aortic valve replacement  Dyslipidemia  - Continue atorvastatin     Chronically elevated troponin due to chronic myocardial  injury  - mildly elevated troponins of 62, 59 on admission.  These are stable/slightly decreased from September and likely represent chronic myocardial injury.   - Echo 9/13 showed atrial fibrillation, bioprosthetic aortic valve well-seated without signs of periprosthetic regurgitation.  Normal LVEF of 55-60%, septal motion consistent with conduction abnormality.  RV not well visualized, appears moderately dilated with mildly decreased function trace mitral and tricuspid regurgitation.  No significant change compared to 11/2022     Chronic paroxysmal atrial fibrillation  - Now started on apixaban for PE      CKD stage 3  - Baseline creatinine around 1.2-1.4. Creatinine 1.36 on admission, improved to 1.18 on 11/27     GERD-continue Pepcid     BPH  - CT scan showed enlarged prostate.  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  Had peripheral neuropathy.  Treated with rituximab, completed in 2012  - Most recent labs were stable and recheck is planned for next month.  Heme-onc did not recommend any further testing at this time.    - Continue gabapentin     Tobacco use disorder  - Patient continues to smoke at least 3 cigarettes/day  - Smoking cessation is strongly advised     Generalized weakness  - Multifactorial due to acute illness, physical deconditioning  - PT/OT consulted.  TCU recommended.  Patient and wife decided to return to home with outpatient PT     Thrombocytopenia: Lowest platelets = 122        Consultations This Hospital Stay   PHARMACY IP CONSULT  HEMATOLOGY & ONCOLOGY IP CONSULT  PHYSICAL THERAPY ADULT IP CONSULT  OCCUPATIONAL THERAPY ADULT IP CONSULT  CARE MANAGEMENT / SOCIAL WORK IP CONSULT  SMOKING CESSATION PROGRAM IP CONSULT  INFECTIOUS DISEASES IP CONSULT  PHARMACY LIAISON FOR MEDICATION COVERAGE CONSULT  VASCULAR ACCESS ADULT IP CONSULT  VASCULAR ACCESS ADULT IP CONSULT    Code Status   Full Code    Time  Spent on this Encounter   I, Glenny Kelly MD, personally saw the patient today and spent greater than 30 minutes discharging this patient.       Glenny Kelly MD  [facility]  [address] [place] [address] [address] [facility] [address]  Phone: [phone]  Fax: [phone]  ______________________________________________________________________    Physical Exam   Vital Signs: Temp: 97.5  F (36.4  C) Temp src: Oral BP: (!) 149/65 Pulse: 77   Resp: 16 SpO2: 97 % O2 Device: Nasal cannula Oxygen Delivery: 2.5 LPM  Weight: 140 lbs 10.46 oz    Constitutional - resting, in no acute distress  GI - abdomen is soft, nondistended, mild tenderness to palpation  Integumentary - skin is warm and dry, no rashes or ulcers  Neurological - Moving all 4 extremities, no focal deficits       Primary Care Physician   Karson Bishop    Discharge Orders      Adult GI  Referral - Consult Only      Physical Therapy Referral      Occupational Therapy Referral      Medication Therapy Management Referral      Reason for your hospital stay    C diff     Follow-up and recommended labs and tests     Follow up with primary care provider, Karson Bishop, within 7 days for hospital follow- up.  No follow up labs or test are needed.     Activity    Your activity upon discharge: activity as tolerated     Diet    Follow this diet upon discharge: Regular       Significant Results and Procedures   Most Recent 3 CBC's:  Recent Labs   Lab Test 11/30/23  0622 11/27/23  1324 11/25/23  2302   WBC 6.8 9.6 11.3*   HGB 10.4* 10.7* 12.0*   MCV 85 84 84   * 117* 150     Most Recent 3 BMP's:  Recent Labs   Lab Test 12/01/23  1022 11/30/23  1534 11/30/23  0622 11/29/23  0936 11/27/23  1324 11/27/23  0500 11/25/23  2301   NA  --   --   --  145  --  139 137   POTASSIUM 4.0 4.0 3.3* 3.5  3.5   < > 3.2*  3.1* 3.9   CHLORIDE  --   --   --  110*  --  105 101   CO2  --   --   --  26  --  22 28   BUN  --   --   --  16.2  --  17.3 18.8   CR   --   --   --  0.94  --  1.18* 1.36*   ANIONGAP  --   --   --  9  --  12 8   AMRITA  --   --   --  9.0  --  8.1* 8.8   GLC  --   --   --  109*  --  108* 160*    < > = values in this interval not displayed.   ,   Results for orders placed or performed during the hospital encounter of 11/25/23   XR Chest 2 Views    Narrative    EXAM: XR CHEST 2 VIEWS  LOCATION: [facility]  DATE: 11/25/2023    INDICATION: Fever  COMPARISON: 09/12/2023      Impression    IMPRESSION: Borderline cardiomegaly. Previous cardiac valvular surgery. Fullness of central pulmonary vessels similar to previous exam. Mild diffuse interstitial prominence stable but questioning slight more focal right infrahilar infiltrate and possible   developing pneumonia right base.   CT Head w/o Contrast    Narrative    EXAM: CT HEAD W/O CONTRAST  LOCATION: [facility]  DATE: 11/26/2023    INDICATION: Confusion.  COMPARISON: 10/26/2023  TECHNIQUE: Routine CT Head without IV contrast. Multiplanar reformats. Dose reduction techniques were used.    FINDINGS:  INTRACRANIAL CONTENTS: No intracranial hemorrhage, extraaxial collection, or mass effect.  No CT evidence of acute infarct. Mild presumed chronic small vessel ischemic changes. Mild generalized volume loss. No hydrocephalus.    Note is made of coarse atherosclerotic calcifications of the intracranial vasculature.      VISUALIZED ORBITS/SINUSES/MASTOIDS: Prior bilateral cataract surgery. Visualized portions of the orbits are otherwise unremarkable. Mild mucosal thickening scattered about the paranasal sinuses. No middle ear or mastoid effusion.    BONES/SOFT TISSUES: No acute abnormality.      Impression    IMPRESSION:  1.  No CT evidence for acute intracranial process.  2.  Brain atrophy and presumed chronic microvascular ischemic changes as above.   CT Chest PE Abdomen Pelvis w Contrast    Narrative    EXAM: CT CHEST PE ABDOMEN PELVIS W CONTRAST  LOCATION:   [facility]  DATE: 11/26/2023    INDICATION: Shortness of breath. Hypoxia. Abdominal pain. Fever.  COMPARISON: CT abdomen and pelvis on 09/12/2023 and chest CT on 09/11/2023.  TECHNIQUE: CT chest pulmonary angiogram and routine CT abdomen pelvis with IV contrast. Arterial phase through the chest and venous phase through the abdomen and pelvis. Multiplanar reformats and MIP reconstructions were performed. Dose reduction   techniques were used.   CONTRAST: 74 mL of Isovue-370.    FINDINGS:  ANGIOGRAM CHEST: Few scattered small filling defects within the subsegmental pulmonary artery is most prominent in the left lower lobe (series 3 image 175), likely represents small pulmonary emboli. No evidence of right heart strain.    LUNGS AND PLEURA: Postsurgical changes of right lower lobe wedge resection. Small to moderate size right pleural effusion and associated basilar atelectasis/consolidation. No significant left pleural effusion. No significant pneumothorax. Upper lobes   predominant emphysematous changes. Right basilar dense consolidative pulmonary opacities along the wedge resection margin, causes narrowing of the adjacent right lower lobe pulmonary artery (series 5 image 157), worrisome for local recurrence or less   likely infection. Multiple nodular pulmonary opacities in the left lower lobe measure up to 1 cm (series 5 image 178), could be metastatic or infectious.    MEDIASTINUM/AXILLAE: No cardiomegaly or significant pericardial effusion. Multiple prominent but not significantly enlarged mediastinal and hilar lymph nodes, indeterminate, could be reactive.    CORONARY ARTERY CALCIFICATION: Moderate.    HEPATOBILIARY: Multiple subcentimeter hypodense foci in the liver, are too small to characterize. The gallbladder is unremarkable.    PANCREAS: No main pancreatic ductal dilatation or definite solid pancreatic mass.    SPLEEN: No splenomegaly.    ADRENAL GLANDS: No adrenal  nodules.    KIDNEYS/BLADDER: No hydronephrosis in either kidney. Contrast within the renal collecting system, precludes detailed evaluation for kidney stones.    BOWEL: No abnormally dilated bowel loops. Mild distal colonic wall thickening predominantly involving the sigmoid colon and rectum, nonspecific, can be seen with acute proctocolitis.    PERITONEUM: No significant free fluid in the abdomen and pelvis. No free peritoneal or portal venous gas.    LYMPH NODES: No significant abdominopelvic lymphadenopathy.    VASCULATURE: Moderate atherosclerotic vascular calcification of the abdominal aorta and iliac vessels. 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    PELVIC ORGANS: Mildly enlarged heterogenous appearance of the prostate gland, of indeterminate etiology. Mild diffuse urinary bladder wall thickening, nonspecific, can be seen with underdistention versus chronic bladder outlet obstruction versus chronic   cystitis.    MUSCULOSKELETAL: Multilevel degenerative changes of the spine.      Impression    IMPRESSION:  1.  Few scattered small filling defects in the subsegmental pulmonary arteries most prominent of the left lower lobe, likely represents small pulmonary emboli.  2.  Medial right lower lobe patchy consolidative pulmonary opacities, along the right lower lobe wedge resection, worrisome for local recurrence versus less likely infection, can be further evaluated with dedicated PET/CT or tissue sampling.  3.  Multiple pulmonary nodules most prominent in the left lower lobe, slightly increased in size as compared to 09/11/2023 exam, likely metastatic.  4.  Small to moderate right pleural effusion.  5.  Mild distal colonic and rectal wall thickening, nonspecific, can be seen with acute proctocolitis.  6.  Mildly enlarged heterogenous appearance of the prostate gland, of indeterminate etiology, recommend correlation with serum PSA.     *Note: Due to a large  number of results and/or encounters for the requested time period, some results have not been displayed. A complete set of results can be found in Results Review.       Discharge Medications   Current Discharge Medication List        START taking these medications    Details   apixaban ANTICOAGULANT (ELIQUIS) 5 MG tablet Take 2 tablets (10 mg) by mouth 2 times daily for 3 days, THEN 1 tablet (5 mg) 2 times daily for 30 days.  Qty: 72 tablet, Refills: 0    Associated Diagnoses: Multiple subsegmental pulmonary emboli without acute cor pulmonale (H)      potassium chloride ER (KLOR-CON M) 10 MEQ CR tablet Take 1 tablet (10 mEq) by mouth 2 times daily for 7 days  Qty: 14 tablet, Refills: 0    Associated Diagnoses: Hypokalemia      vancomycin (VANCOCIN) 125 MG capsule Take 1 capsule (125 mg) by mouth 4 times daily  Qty: 120 capsule, Refills: 1    Associated Diagnoses: C. difficile colitis           CONTINUE these medications which have CHANGED    Details   tamsulosin (FLOMAX) 0.4 MG capsule Take 1 capsule (0.4 mg) by mouth daily    Associated Diagnoses: Benign prostatic hyperplasia, unspecified whether lower urinary tract symptoms present           CONTINUE these medications which have NOT CHANGED    Details   acetaminophen (TYLENOL) 500 MG tablet Take 1,000 mg by mouth 3 times daily as needed      albuterol (PROAIR HFA/PROVENTIL HFA/VENTOLIN HFA) 108 (90 Base) MCG/ACT inhaler Inhale 2 puffs into the lungs every 6 hours as needed      albuterol (PROVENTIL) (2.5 MG/3ML) 0.083% neb solution Take 1 vial (2.5 mg) by nebulization every 6 hours as needed for shortness of breath / dyspnea or wheezing  Qty: 180 mL, Refills: 11      atorvastatin (LIPITOR) 10 MG tablet Take 1 tablet (10 mg) by mouth daily  Qty: 90 tablet, Refills: 0    Associated Diagnoses: Hyperlipidemia LDL goal <100      famotidine (PEPCID) 40 MG tablet TAKE ONE TABLET BY MOUTH TWICE DAILY  Qty: 180 tablet, Refills: 0    Associated Diagnoses: Gastroesophageal  "reflux disease without esophagitis      ferrous sulfate (FE TABS) 325 (65 Fe) MG EC tablet Take 1 tablet (325 mg) by mouth 2 times daily      fludrocortisone (FLORINEF) 0.1 MG tablet Take 1 tablet (0.1 mg) by mouth daily  Qty: 90 tablet, Refills: 3    Associated Diagnoses: Orthostatic hypotension      gabapentin (NEURONTIN) 300 MG capsule Take 2 capsules (600 mg) by mouth At Bedtime For neuropathy pain    Associated Diagnoses: Peripheral polyneuropathy      tiotropium (SPIRIVA) 18 MCG inhaled capsule Inhale 18 mcg into the lungs daily      fluticasone-vilanterol (BREO ELLIPTA) 200-25 MCG/ACT inhaler Inhale 1 puff into the lungs daily           Allergies   Allergies   Allergen Reactions    Omeprazole Itching    Pantoprazole Itching    Prevacid [Lansoprazole] Itching    Lasix [Furosemide] Rash    Lidocaine Blisters and Rash     Allergy to lidocaine ointment  Allergy to lidocaine ointment      Penicillin G Rash    Penicillins Rash     \"broke out from injection\" 60 yrs ago  Tolerates cephalosporins     "

## 2023-12-01 NOTE — DISCHARGE INSTRUCTIONS
Take vancomycin 4 times a day until you are seen by GI specialist at [facility].  Please contact your primary care if you are about to run out of your prescription.  You have also been started on apixaban which is an anticoagulant for finding of pulmonary embolism(blood clots in lungs).  Monitor for any evidence of blood loss especially black stools, blood in stool, maroon stools.

## 2023-12-01 NOTE — PLAN OF CARE
Goal Outcome Evaluation:        Pt A&Ox3, disoriented to situation, intermittent confusion w/ forgetfulness. VSS on 3L O2 via NC. Regular diet. Up 1A GB&W, voiding in BR.  Denies pain/SOB. L PIV SL. K+ and Mag replacement protocol, rechecks tomorrow AM. Enteric precautions maintained. Heme/Onc and ID following. Possible discharge home tomorrow. Continue plan of care.

## 2023-12-01 NOTE — PROGRESS NOTES
VSS, CMS intact. Up with SBA and walker. Denies pain. BM x1 this shift. Reviewed AVS with teach back. Discharge medications and instruction sheets given. A&OX2. LEFT FLOOR VIA WHEELCHAIR.

## 2023-12-01 NOTE — PLAN OF CARE
Occupational Therapy Discharge Summary    Reason for therapy discharge:    Discharged to home with outpatient therapy.    Progress towards therapy goal(s). See goals on Care Plan in [facility] electronic health record for goal details.  Goals partially met.  Barriers to achieving goals:   discharge from facility.    Therapy recommendation(s):    Pt functioning below baseline with noted cog impairment, balance and strength deficits, decreased activity yoli, impacting safety/ind. Pt resides at home with spouse and 2 PATI. Per chart, pt scored 7/30 on SLUMS in Sept. 2023. Pt with recent hospital admissions. Pt currently A x 1 for functional mobility and requires increased A with self cares. Rec skilled TCU for increased I/ADL ind. Pt would likely benefit from higher level of care in least restrictive environment such as an [facility]. OT will continue to follow and update d/c recs as appropriate.

## 2023-12-01 NOTE — PLAN OF CARE
Physical Therapy Discharge Summary    Reason for therapy discharge:    Discharged to home with outpatient therapy.    Progress towards therapy goal(s). See goals on Care Plan in [facility] electronic health record for goal details.  Goals partially met.  Barriers to achieving goals:   discharge from facility.    Therapy recommendation(s):    Continued therapy is recommended.  Rationale/Recommendations:   .     PT Discharge Planning:    PT Plan: Progress ambulation distance, progress stair training  PT Discharge Recommendation (DC Rec): Transitional Care Facility, home with assist, home with outpatient physical therapy  PT Rationale for DC Rec: Pt is currently moving below baseline mobility, currently requiring Ax1 for all functional mobility and on 2-3 L supplemental O2 at this time. Pt is limited by activity tolerance, balance, confusion, and weakness at this time, recommend TCU in order to address these deficitis prior to returning home with assist of spouse. Pending progress and level of assist at home with wife, pt may be safe to return home with assist of wife and usage of FWW for mobility.  PT Brief overview of current status: CGA for all functional mobility on 3 L SpO2  PT Equipment Needed at Discharge: walker, rolling          Recommendation above provided by last treating therapist.

## 2023-12-01 NOTE — PLAN OF CARE
Goal Outcome Evaluation:    Date/Time: 11/30/23 (7625-9393)    Diagnosis: Pneumonia, C-diff, left lower lobe PE  POD#: NA  Mental Status: confused, A&O x2  Activity/dangle: assist of 1 GB/Wk  Diet: Reg  Pain: denies  Suazo/Voiding: urinal, incontinent at times.   Tele/Restraints/Iso: Enteric  02/LDA: 0n 3L NC hx COPD  D/C Date: possible home today with spouse   Other Info: K & Mg protocol. Scattered bruises. No BM this shift.

## 2023-12-04 NOTE — PROGRESS NOTES
[facility]    Background: Transitional Care Management program identified per system criteria and reviewed by [facility] team for possible outreach.    Assessment: Upon chart review, CCR Team member will not proceed with patient outreach related to this episode of Transitional Care Management program due to reason below:    Patient declined to answer all post hospital discharge questions with CCRC team member and disconnected call.    Plan: Transitional Care Management episode addressed appropriately per reason noted above.      DENG Kwon  [facility]    *Connected Care Resource Team does NOT follow patient ongoing. Referrals are identified based on internal discharge reports and the outreach is to ensure patient has an understanding of their discharge instructions.

## 2023-12-04 NOTE — TELEPHONE ENCOUNTER
Reason for Call:  Appointment Request    Patient requesting this type of appt:  Hospital/ED Follow-Up     Requested provider: Karson Bishop    Reason patient unable to be scheduled: Not within requested timeframe    When does patient want to be seen/preferred time: 1-2 days    Comments: Pt looking to see PCP, preferably, within the next week for hosp follow up, [place] discharge 12/1, c difficile, low oxygen. Pt prefers early afternoon for appts    Could we send this information to you in [facility] or would you prefer to receive a phone call?:   Patient would prefer a phone call   Okay to leave a detailed message?: No at Cell number on file:    Telephone Information:   Mobile [phone]       Call taken on 12/4/2023 at 10:39 AM by Esther Duarte

## 2023-12-07 NOTE — TELEPHONE ENCOUNTER
MTM referral from: Transitions of Care (recent hospital discharge or ED visit)    MTM referral outreach attempt #2 on December 7, 2023 at 1:20 PM      Outcome: Patient not reachable after several attempts, will route to [facility] Pharmacist/Provider as an FYI.  [facility] scheduling number is .  Thank you for the referral.    Use [facility] part d map (mariela)  for the carrier/Plan on the flowsheet      Cheft Message Sent    Annie Nowak  [facility]

## 2023-12-07 NOTE — PROGRESS NOTES
Assessment & Plan     C. difficile colitis  Complete course of vanco and see GI re fecal transplant   - Adult GI  Referral - Consult Only; Future    Mass of tongue  See ENT ; contact information give to patient and wife; informed them that this could be new caner  - Adult ENT  Referral; Future    Acute gout of left hand, unspecified cause  Try below for suspected gout  - predniSONE (DELTASONE) 20 MG tablet; Take 1 tablet (20 mg) by mouth daily    Pulmonary nodules  Could be cancer reoccurrence  Plans to discuss biopsy with oncology next week  I had bobby discussion with Chapito and wife that he may not want to pursue aggressive diagnostics or treatment for this problem since his overall health state is declining and he would not be a great candidate for treatment of recurrent cancer     Other pulmonary embolism without acute cor pulmonale, unspecified chronicity (H)  He was started back on OAC  He has bled on this in past  Plan to recheck hemoglobin in 4 weeks with previsit hemoglobin     Anemia, unspecified type    - Hemoglobin; Future    We had something of a 'serious illness discussion' today  His overall prognosis is guarded  He might want to consider comfort based approach rather than aggressive diagnosis and treatment  He and his wife were not very open to considering this at this time  Discussed CODE STATUS and POLST form  They would not ready to complete the form today, but took form home to review       No LOS data to display   Time spent by me doing chart review, history and exam, documentation and further activities per the note     MED REC REQUIRED  Post Medication Reconciliation Status: discharge medications reconciled and changed, per note/orders  FUTURE APPOINTMENTS:       - Follow-up visit in 4 weeks; Patient instructed to return to clinic or contact us sooner if symptoms worsen or new symptoms develop.     Karson Bishop MD  [facility]  is a 89 year old, presenting for the following health issues:  Hospital F/U, Musculoskeletal Problem (Hands in pain, 9 out of 10, ), and Edema (Right arm swelling)        12/7/2023    11:35 AM   Additional Questions   Roomed by Guerrero   Accompanied by Not applicable, by themselves       HPI       Hospital Follow-up Visit:    Hospital/Nursing Home/IP Rehab Facility: [facility]  Date of Admission: 11/25/2023  Date of Discharge: 12/1/2023  Reason(s) for Admission: Fever, unspecified fever cause/ Recurrent C. difficile procto-colitis, with sepsis     Was your hospitalization related to COVID-19? No   Problems taking medications regularly:  None  Medication changes since discharge: None  Problems adhering to non-medication therapy:  None    Summary of hospitalization:  [facility] discharge summary reviewed  Diagnostic Tests/Treatments reviewed.  Follow up needed:     Follow-up Appointments     Follow-up and recommended labs and tests       Follow up with primary care provider, Karson Bishop, within 7 days for   hospital follow- up.  No follow up labs or test are needed.      Other Healthcare Providers Involved in Patient s Care:         None  Update since discharge: worsened.hands in pain swelling             Plan of care communicated with patient and family           Difficult scenario  He is very hard of hearing  Most communication was with wife today  He went to hospital because he was tired and his oxygen was low  He was found to have C. Diff  New lung nodules concerning for cancer reoccurrence  He has an new lesion on the base of his tongue  He also has new pulmonary emboli and apixaban was restarted  He has had GI bleeding on OAC in past   Since discharge his right hand became swollen and tender  No fevers  He is still having diarrhea         Review of Systems         Objective    /74 (BP Location: Left arm, Patient Position: Sitting, Cuff Size: Adult Regular)    "Pulse 89   Temp 97  F (36.1  C) (Oral)   Resp 18   Ht 1.778 m (5' 10\")   Wt 63.5 kg (140 lb)   SpO2 92%   BMI 20.09 kg/m    Body mass index is 20.09 kg/m .  Physical Exam   Frail man sitting in wheelchair, swollen right finger joints that are tender                      "

## 2023-12-12 NOTE — TELEPHONE ENCOUNTER
received a message from CECILIA PACHECO to help get Pt scheduled for a New C. Diff appointment with Dr. Haq. Karlar called and talked with Pt.  offered Pt an appointment with Dr. Haq on 12/19/2023 at 9 AM at the [place] location. Pt did not accept the appointment and would like to hold off on scheduling an appointment at the moment. Pt is requesting an afternoon appointment at the [place] location. Karlar informed the Pt that the provider only as morning appointments at both the [place] and [facility]. Karlar left call back number with the Pt and Pt can call back to schedule an appointment with Pt is ready. Pt expressed understanding of the plan.     will send a message to update CECILIA PACHECO and will be closing this encounter.

## 2024-01-01 ENCOUNTER — APPOINTMENT (OUTPATIENT)
Dept: GENERAL RADIOLOGY | Facility: CLINIC | Age: 89
DRG: 640 | End: 2024-01-01
Attending: EMERGENCY MEDICINE
Payer: COMMERCIAL

## 2024-01-01 ENCOUNTER — APPOINTMENT (OUTPATIENT)
Dept: CT IMAGING | Facility: CLINIC | Age: 89
DRG: 640 | End: 2024-01-01
Attending: EMERGENCY MEDICINE
Payer: COMMERCIAL

## 2024-01-01 ENCOUNTER — TELEPHONE (OUTPATIENT)
Dept: FAMILY MEDICINE | Facility: CLINIC | Age: 89
End: 2024-01-01
Payer: COMMERCIAL

## 2024-01-01 ENCOUNTER — APPOINTMENT (OUTPATIENT)
Dept: PHYSICAL THERAPY | Facility: CLINIC | Age: 89
DRG: 377 | End: 2024-01-01
Attending: HOSPITALIST
Payer: COMMERCIAL

## 2024-01-01 ENCOUNTER — TRANSITIONAL CARE UNIT VISIT (OUTPATIENT)
Dept: GERIATRICS | Facility: CLINIC | Age: 89
End: 2024-01-01
Payer: COMMERCIAL

## 2024-01-01 ENCOUNTER — APPOINTMENT (OUTPATIENT)
Dept: ULTRASOUND IMAGING | Facility: CLINIC | Age: 89
DRG: 871 | End: 2024-01-01
Attending: HOSPITALIST
Payer: COMMERCIAL

## 2024-01-01 ENCOUNTER — TELEPHONE (OUTPATIENT)
Dept: GERIATRICS | Facility: CLINIC | Age: 89
End: 2024-01-01

## 2024-01-01 ENCOUNTER — APPOINTMENT (OUTPATIENT)
Dept: PHYSICAL THERAPY | Facility: CLINIC | Age: 89
DRG: 377 | End: 2024-01-01
Payer: COMMERCIAL

## 2024-01-01 ENCOUNTER — HOSPITAL ENCOUNTER (EMERGENCY)
Facility: CLINIC | Age: 89
Discharge: HOME | End: 2024-03-04
Attending: EMERGENCY MEDICINE | Admitting: EMERGENCY MEDICINE
Payer: COMMERCIAL

## 2024-01-01 ENCOUNTER — APPOINTMENT (OUTPATIENT)
Dept: CT IMAGING | Facility: CLINIC | Age: 89
DRG: 871 | End: 2024-01-01
Attending: EMERGENCY MEDICINE
Payer: COMMERCIAL

## 2024-01-01 ENCOUNTER — APPOINTMENT (OUTPATIENT)
Dept: CT IMAGING | Facility: CLINIC | Age: 89
DRG: 377 | End: 2024-01-01
Attending: PHYSICIAN ASSISTANT
Payer: COMMERCIAL

## 2024-01-01 ENCOUNTER — HOSPITAL ENCOUNTER (INPATIENT)
Facility: CLINIC | Age: 89
LOS: 3 days | Discharge: HOME | DRG: 377 | End: 2024-01-11
Attending: STUDENT IN AN ORGANIZED HEALTH CARE EDUCATION/TRAINING PROGRAM | Admitting: INTERNAL MEDICINE
Payer: COMMERCIAL

## 2024-01-01 ENCOUNTER — APPOINTMENT (OUTPATIENT)
Dept: SPEECH THERAPY | Facility: CLINIC | Age: 89
DRG: 871 | End: 2024-01-01
Payer: COMMERCIAL

## 2024-01-01 ENCOUNTER — DISCHARGE SUMMARY NURSING HOME (OUTPATIENT)
Dept: GERIATRICS | Facility: CLINIC | Age: 89
End: 2024-01-01
Payer: COMMERCIAL

## 2024-01-01 ENCOUNTER — TELEPHONE (OUTPATIENT)
Dept: FAMILY MEDICINE | Facility: CLINIC | Age: 89
End: 2024-01-01

## 2024-01-01 ENCOUNTER — APPOINTMENT (OUTPATIENT)
Dept: SPEECH THERAPY | Facility: CLINIC | Age: 89
DRG: 640 | End: 2024-01-01
Payer: COMMERCIAL

## 2024-01-01 ENCOUNTER — APPOINTMENT (OUTPATIENT)
Dept: PHYSICAL THERAPY | Facility: CLINIC | Age: 89
DRG: 377 | End: 2024-01-01
Attending: INTERNAL MEDICINE
Payer: COMMERCIAL

## 2024-01-01 ENCOUNTER — APPOINTMENT (OUTPATIENT)
Dept: GENERAL RADIOLOGY | Facility: CLINIC | Age: 89
End: 2024-01-01
Attending: EMERGENCY MEDICINE
Payer: COMMERCIAL

## 2024-01-01 ENCOUNTER — PATIENT OUTREACH (OUTPATIENT)
Dept: NURSING | Facility: CLINIC | Age: 89
End: 2024-01-01
Payer: COMMERCIAL

## 2024-01-01 ENCOUNTER — DOCUMENTATION ONLY (OUTPATIENT)
Dept: GERIATRICS | Facility: CLINIC | Age: 89
End: 2024-01-01

## 2024-01-01 ENCOUNTER — APPOINTMENT (OUTPATIENT)
Dept: SPEECH THERAPY | Facility: CLINIC | Age: 89
DRG: 377 | End: 2024-01-01
Payer: COMMERCIAL

## 2024-01-01 ENCOUNTER — NURSE TRIAGE (OUTPATIENT)
Dept: FAMILY MEDICINE | Facility: CLINIC | Age: 89
End: 2024-01-01
Payer: COMMERCIAL

## 2024-01-01 ENCOUNTER — MEDICAL CORRESPONDENCE (OUTPATIENT)
Dept: HEALTH INFORMATION MANAGEMENT | Facility: CLINIC | Age: 89
End: 2024-01-01

## 2024-01-01 ENCOUNTER — TRANSFERRED RECORDS (OUTPATIENT)
Dept: HEALTH INFORMATION MANAGEMENT | Facility: CLINIC | Age: 89
End: 2024-01-01
Payer: COMMERCIAL

## 2024-01-01 ENCOUNTER — APPOINTMENT (OUTPATIENT)
Dept: GENERAL RADIOLOGY | Facility: CLINIC | Age: 89
DRG: 377 | End: 2024-01-01
Attending: EMERGENCY MEDICINE
Payer: COMMERCIAL

## 2024-01-01 ENCOUNTER — APPOINTMENT (OUTPATIENT)
Dept: SPEECH THERAPY | Facility: CLINIC | Age: 89
DRG: 871 | End: 2024-01-01
Attending: INTERNAL MEDICINE
Payer: COMMERCIAL

## 2024-01-01 ENCOUNTER — APPOINTMENT (OUTPATIENT)
Dept: PHYSICAL THERAPY | Facility: CLINIC | Age: 89
DRG: 871 | End: 2024-01-01
Payer: COMMERCIAL

## 2024-01-01 ENCOUNTER — APPOINTMENT (OUTPATIENT)
Dept: SPEECH THERAPY | Facility: CLINIC | Age: 89
DRG: 640 | End: 2024-01-01
Attending: STUDENT IN AN ORGANIZED HEALTH CARE EDUCATION/TRAINING PROGRAM
Payer: COMMERCIAL

## 2024-01-01 ENCOUNTER — TELEPHONE (OUTPATIENT)
Dept: CARDIOLOGY | Facility: CLINIC | Age: 89
End: 2024-01-01

## 2024-01-01 ENCOUNTER — VIRTUAL VISIT (OUTPATIENT)
Dept: FAMILY MEDICINE | Facility: CLINIC | Age: 89
End: 2024-01-01
Payer: COMMERCIAL

## 2024-01-01 ENCOUNTER — APPOINTMENT (OUTPATIENT)
Dept: GENERAL RADIOLOGY | Facility: CLINIC | Age: 89
DRG: 377 | End: 2024-01-01
Attending: STUDENT IN AN ORGANIZED HEALTH CARE EDUCATION/TRAINING PROGRAM
Payer: COMMERCIAL

## 2024-01-01 ENCOUNTER — PATIENT OUTREACH (OUTPATIENT)
Dept: CARE COORDINATION | Facility: CLINIC | Age: 89
End: 2024-01-01
Payer: COMMERCIAL

## 2024-01-01 ENCOUNTER — APPOINTMENT (OUTPATIENT)
Dept: PHYSICAL THERAPY | Facility: CLINIC | Age: 89
DRG: 640 | End: 2024-01-01
Payer: COMMERCIAL

## 2024-01-01 ENCOUNTER — APPOINTMENT (OUTPATIENT)
Dept: GENERAL RADIOLOGY | Facility: CLINIC | Age: 89
DRG: 871 | End: 2024-01-01
Attending: INTERNAL MEDICINE
Payer: COMMERCIAL

## 2024-01-01 ENCOUNTER — APPOINTMENT (OUTPATIENT)
Dept: MRI IMAGING | Facility: CLINIC | Age: 89
DRG: 377 | End: 2024-01-01
Attending: PHYSICIAN ASSISTANT
Payer: COMMERCIAL

## 2024-01-01 ENCOUNTER — APPOINTMENT (OUTPATIENT)
Dept: CARDIOLOGY | Facility: CLINIC | Age: 89
DRG: 871 | End: 2024-01-01
Attending: INTERNAL MEDICINE
Payer: COMMERCIAL

## 2024-01-01 ENCOUNTER — APPOINTMENT (OUTPATIENT)
Dept: PHYSICAL THERAPY | Facility: CLINIC | Age: 89
DRG: 871 | End: 2024-01-01
Attending: INTERNAL MEDICINE
Payer: COMMERCIAL

## 2024-01-01 ENCOUNTER — APPOINTMENT (OUTPATIENT)
Dept: GENERAL RADIOLOGY | Facility: CLINIC | Age: 89
DRG: 871 | End: 2024-01-01
Attending: EMERGENCY MEDICINE
Payer: COMMERCIAL

## 2024-01-01 ENCOUNTER — APPOINTMENT (OUTPATIENT)
Dept: CT IMAGING | Facility: CLINIC | Age: 89
DRG: 377 | End: 2024-01-01
Attending: STUDENT IN AN ORGANIZED HEALTH CARE EDUCATION/TRAINING PROGRAM
Payer: COMMERCIAL

## 2024-01-01 ENCOUNTER — HOSPITAL ENCOUNTER (OUTPATIENT)
Dept: CARDIOLOGY | Facility: CLINIC | Age: 89
Discharge: HOME | End: 2024-01-03
Attending: INTERNAL MEDICINE | Admitting: INTERNAL MEDICINE
Payer: COMMERCIAL

## 2024-01-01 ENCOUNTER — APPOINTMENT (OUTPATIENT)
Dept: SPEECH THERAPY | Facility: CLINIC | Age: 89
DRG: 377 | End: 2024-01-01
Attending: PHYSICIAN ASSISTANT
Payer: COMMERCIAL

## 2024-01-01 ENCOUNTER — HOSPITAL ENCOUNTER (INPATIENT)
Facility: CLINIC | Age: 89
LOS: 5 days | Discharge: NURSING, REHAB OR LONG-TERM CARE | DRG: 377 | End: 2024-01-21
Attending: EMERGENCY MEDICINE | Admitting: HOSPITALIST
Payer: COMMERCIAL

## 2024-01-01 ENCOUNTER — APPOINTMENT (OUTPATIENT)
Dept: CT IMAGING | Facility: CLINIC | Age: 89
End: 2024-01-01
Attending: EMERGENCY MEDICINE
Payer: COMMERCIAL

## 2024-01-01 ENCOUNTER — APPOINTMENT (OUTPATIENT)
Dept: OCCUPATIONAL THERAPY | Facility: CLINIC | Age: 89
DRG: 377 | End: 2024-01-01
Attending: INTERNAL MEDICINE
Payer: COMMERCIAL

## 2024-01-01 ENCOUNTER — PATIENT OUTREACH (OUTPATIENT)
Dept: CARE COORDINATION | Facility: CLINIC | Age: 89
End: 2024-01-01

## 2024-01-01 ENCOUNTER — HOSPITAL ENCOUNTER (INPATIENT)
Facility: CLINIC | Age: 89
LOS: 11 days | Discharge: HOSPICE | DRG: 640 | End: 2024-03-18
Attending: EMERGENCY MEDICINE | Admitting: STUDENT IN AN ORGANIZED HEALTH CARE EDUCATION/TRAINING PROGRAM
Payer: COMMERCIAL

## 2024-01-01 ENCOUNTER — HOSPITAL ENCOUNTER (INPATIENT)
Facility: CLINIC | Age: 89
LOS: 8 days | Discharge: HOME HEALTH | DRG: 871 | End: 2024-02-28
Attending: EMERGENCY MEDICINE | Admitting: INTERNAL MEDICINE
Payer: COMMERCIAL

## 2024-01-01 VITALS
RESPIRATION RATE: 14 BRPM | HEART RATE: 96 BPM | DIASTOLIC BLOOD PRESSURE: 75 MMHG | TEMPERATURE: 98 F | BODY MASS INDEX: 19.76 KG/M2 | WEIGHT: 138 LBS | SYSTOLIC BLOOD PRESSURE: 127 MMHG | HEIGHT: 70 IN | OXYGEN SATURATION: 92 %

## 2024-01-01 VITALS
DIASTOLIC BLOOD PRESSURE: 91 MMHG | SYSTOLIC BLOOD PRESSURE: 158 MMHG | HEIGHT: 70 IN | OXYGEN SATURATION: 90 % | TEMPERATURE: 97.6 F | RESPIRATION RATE: 20 BRPM | HEART RATE: 84 BPM | BODY MASS INDEX: 21.19 KG/M2 | WEIGHT: 148 LBS

## 2024-01-01 VITALS
WEIGHT: 150 LBS | HEART RATE: 82 BPM | TEMPERATURE: 97.3 F | SYSTOLIC BLOOD PRESSURE: 108 MMHG | BODY MASS INDEX: 21.47 KG/M2 | DIASTOLIC BLOOD PRESSURE: 61 MMHG | OXYGEN SATURATION: 94 % | RESPIRATION RATE: 18 BRPM | HEIGHT: 70 IN

## 2024-01-01 VITALS
HEIGHT: 70 IN | RESPIRATION RATE: 18 BRPM | TEMPERATURE: 97.4 F | OXYGEN SATURATION: 91 % | SYSTOLIC BLOOD PRESSURE: 98 MMHG | BODY MASS INDEX: 21.19 KG/M2 | HEART RATE: 89 BPM | WEIGHT: 148 LBS | DIASTOLIC BLOOD PRESSURE: 55 MMHG

## 2024-01-01 VITALS
TEMPERATURE: 97.3 F | WEIGHT: 136.91 LBS | WEIGHT: 136.24 LBS | HEART RATE: 66 BPM | OXYGEN SATURATION: 94 % | SYSTOLIC BLOOD PRESSURE: 131 MMHG | BODY MASS INDEX: 19.51 KG/M2 | HEIGHT: 70 IN | DIASTOLIC BLOOD PRESSURE: 69 MMHG | DIASTOLIC BLOOD PRESSURE: 79 MMHG | RESPIRATION RATE: 16 BRPM | RESPIRATION RATE: 18 BRPM | OXYGEN SATURATION: 94 % | SYSTOLIC BLOOD PRESSURE: 124 MMHG | BODY MASS INDEX: 19.64 KG/M2 | HEART RATE: 94 BPM | TEMPERATURE: 98.4 F

## 2024-01-01 VITALS
OXYGEN SATURATION: 94 % | TEMPERATURE: 98 F | DIASTOLIC BLOOD PRESSURE: 78 MMHG | HEART RATE: 96 BPM | BODY MASS INDEX: 19.76 KG/M2 | HEIGHT: 70 IN | SYSTOLIC BLOOD PRESSURE: 130 MMHG | WEIGHT: 138 LBS | RESPIRATION RATE: 14 BRPM

## 2024-01-01 VITALS
WEIGHT: 150 LBS | TEMPERATURE: 98 F | HEIGHT: 70 IN | DIASTOLIC BLOOD PRESSURE: 64 MMHG | RESPIRATION RATE: 18 BRPM | HEART RATE: 93 BPM | SYSTOLIC BLOOD PRESSURE: 92 MMHG | BODY MASS INDEX: 21.47 KG/M2 | OXYGEN SATURATION: 91 %

## 2024-01-01 VITALS
OXYGEN SATURATION: 96 % | HEIGHT: 70 IN | TEMPERATURE: 97.8 F | BODY MASS INDEX: 17.61 KG/M2 | DIASTOLIC BLOOD PRESSURE: 89 MMHG | RESPIRATION RATE: 18 BRPM | WEIGHT: 123 LBS | SYSTOLIC BLOOD PRESSURE: 137 MMHG | HEART RATE: 112 BPM

## 2024-01-01 VITALS
HEIGHT: 70 IN | BODY MASS INDEX: 21.47 KG/M2 | SYSTOLIC BLOOD PRESSURE: 103 MMHG | WEIGHT: 150 LBS | HEART RATE: 101 BPM | RESPIRATION RATE: 18 BRPM | TEMPERATURE: 97.2 F | DIASTOLIC BLOOD PRESSURE: 65 MMHG | OXYGEN SATURATION: 92 %

## 2024-01-01 VITALS
HEIGHT: 70 IN | TEMPERATURE: 97.6 F | RESPIRATION RATE: 15 BRPM | WEIGHT: 140 LBS | SYSTOLIC BLOOD PRESSURE: 134 MMHG | HEART RATE: 76 BPM | DIASTOLIC BLOOD PRESSURE: 74 MMHG | OXYGEN SATURATION: 91 % | BODY MASS INDEX: 20.04 KG/M2

## 2024-01-01 VITALS
OXYGEN SATURATION: 92 % | DIASTOLIC BLOOD PRESSURE: 74 MMHG | RESPIRATION RATE: 20 BRPM | HEART RATE: 84 BPM | SYSTOLIC BLOOD PRESSURE: 140 MMHG | BODY MASS INDEX: 20.04 KG/M2 | WEIGHT: 140 LBS | HEIGHT: 70 IN | TEMPERATURE: 97.8 F

## 2024-01-01 VITALS
TEMPERATURE: 97.2 F | DIASTOLIC BLOOD PRESSURE: 84 MMHG | OXYGEN SATURATION: 97 % | HEART RATE: 85 BPM | RESPIRATION RATE: 20 BRPM | SYSTOLIC BLOOD PRESSURE: 122 MMHG

## 2024-01-01 VITALS
SYSTOLIC BLOOD PRESSURE: 146 MMHG | TEMPERATURE: 98.2 F | WEIGHT: 143.52 LBS | BODY MASS INDEX: 20.55 KG/M2 | RESPIRATION RATE: 18 BRPM | DIASTOLIC BLOOD PRESSURE: 84 MMHG | HEIGHT: 70 IN | OXYGEN SATURATION: 93 % | HEART RATE: 95 BPM

## 2024-01-01 VITALS
SYSTOLIC BLOOD PRESSURE: 142 MMHG | HEIGHT: 70 IN | OXYGEN SATURATION: 90 % | TEMPERATURE: 99.2 F | WEIGHT: 150 LBS | BODY MASS INDEX: 21.47 KG/M2 | RESPIRATION RATE: 16 BRPM | DIASTOLIC BLOOD PRESSURE: 65 MMHG | HEART RATE: 96 BPM

## 2024-01-01 VITALS
DIASTOLIC BLOOD PRESSURE: 87 MMHG | HEART RATE: 89 BPM | OXYGEN SATURATION: 92 % | WEIGHT: 140 LBS | BODY MASS INDEX: 20.04 KG/M2 | RESPIRATION RATE: 16 BRPM | HEIGHT: 70 IN | TEMPERATURE: 98.4 F | SYSTOLIC BLOOD PRESSURE: 144 MMHG

## 2024-01-01 DIAGNOSIS — A04.72 COLITIS DUE TO CLOSTRIDIUM DIFFICILE: ICD-10-CM

## 2024-01-01 DIAGNOSIS — E44.0 MODERATE PROTEIN-CALORIE MALNUTRITION (H): ICD-10-CM

## 2024-01-01 DIAGNOSIS — R62.7 FAILURE TO THRIVE IN ADULT: ICD-10-CM

## 2024-01-01 DIAGNOSIS — I10 ESSENTIAL HYPERTENSION WITH GOAL BLOOD PRESSURE LESS THAN 140/90: ICD-10-CM

## 2024-01-01 DIAGNOSIS — I48.0 PAROXYSMAL ATRIAL FIBRILLATION (H): ICD-10-CM

## 2024-01-01 DIAGNOSIS — J44.9 CHRONIC OBSTRUCTIVE PULMONARY DISEASE, UNSPECIFIED COPD TYPE (H): ICD-10-CM

## 2024-01-01 DIAGNOSIS — R55 SYNCOPE AND COLLAPSE: ICD-10-CM

## 2024-01-01 DIAGNOSIS — F41.9 ANXIETY: ICD-10-CM

## 2024-01-01 DIAGNOSIS — M54.50 ACUTE LEFT-SIDED LOW BACK PAIN WITHOUT SCIATICA: ICD-10-CM

## 2024-01-01 DIAGNOSIS — R13.10 DYSPHAGIA, UNSPECIFIED TYPE: ICD-10-CM

## 2024-01-01 DIAGNOSIS — A04.72 C. DIFFICILE COLITIS: ICD-10-CM
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
## 2024-01-01 DIAGNOSIS — J44.0 CHRONIC OBSTRUCTIVE PULMONARY DISEASE WITH ACUTE LOWER RESPIRATORY INFECTION (H): ICD-10-CM

## 2024-01-01 DIAGNOSIS — C34.91 ADENOCARCINOMA OF RIGHT LUNG (H): ICD-10-CM

## 2024-01-01 DIAGNOSIS — R41.89 COGNITIVE IMPAIRMENT: ICD-10-CM

## 2024-01-01 DIAGNOSIS — R62.7 ADULT FAILURE TO THRIVE: ICD-10-CM

## 2024-01-01 DIAGNOSIS — R12 HEART BURN: ICD-10-CM

## 2024-01-01 DIAGNOSIS — R52 PAIN: ICD-10-CM

## 2024-01-01 DIAGNOSIS — R41.82 ALTERED MENTAL STATUS, UNSPECIFIED ALTERED MENTAL STATUS TYPE: Primary | ICD-10-CM

## 2024-01-01 DIAGNOSIS — R22.0 NODULE OF TONGUE: ICD-10-CM

## 2024-01-01 DIAGNOSIS — R62.7 FAILURE TO THRIVE IN ADULT: Primary | ICD-10-CM

## 2024-01-01 DIAGNOSIS — Z51.5 HOSPICE CARE PATIENT: ICD-10-CM
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
## 2024-01-01 DIAGNOSIS — Z95.2 HISTORY OF TRANSCATHETER AORTIC VALVE REPLACEMENT (TAVR): ICD-10-CM

## 2024-01-01 DIAGNOSIS — A04.72 C. DIFFICILE COLITIS: Primary | ICD-10-CM

## 2024-01-01 DIAGNOSIS — M79.642 PAIN OF LEFT HAND: ICD-10-CM

## 2024-01-01 DIAGNOSIS — A41.9 SEPSIS, DUE TO UNSPECIFIED ORGANISM, UNSPECIFIED WHETHER ACUTE ORGAN DYSFUNCTION PRESENT (H): ICD-10-CM

## 2024-01-01 DIAGNOSIS — R11.0 NAUSEA: ICD-10-CM

## 2024-01-01 DIAGNOSIS — R53.81 PHYSICAL DECONDITIONING: ICD-10-CM

## 2024-01-01 DIAGNOSIS — K14.8 MASS OF TONGUE: ICD-10-CM

## 2024-01-01 DIAGNOSIS — K21.00 GASTROESOPHAGEAL REFLUX DISEASE WITH ESOPHAGITIS, UNSPECIFIED WHETHER HEMORRHAGE: ICD-10-CM

## 2024-01-01 DIAGNOSIS — L89.156 PRESSURE INJURY OF DEEP TISSUE OF SACRAL REGION: Primary | ICD-10-CM

## 2024-01-01 DIAGNOSIS — N18.30 STAGE 3 CHRONIC KIDNEY DISEASE, UNSPECIFIED WHETHER STAGE 3A OR 3B CKD (H): ICD-10-CM

## 2024-01-01 DIAGNOSIS — M79.642 PAIN OF LEFT HAND: Primary | ICD-10-CM

## 2024-01-01 DIAGNOSIS — Z86.711 HISTORY OF PULMONARY EMBOLISM: ICD-10-CM

## 2024-01-01 DIAGNOSIS — F39 MOOD DISORDER (H): Primary | ICD-10-CM

## 2024-01-01 DIAGNOSIS — K21.9 GASTROESOPHAGEAL REFLUX DISEASE WITHOUT ESOPHAGITIS: ICD-10-CM

## 2024-01-01 DIAGNOSIS — C34.90 MALIGNANT NEOPLASM OF LUNG, UNSPECIFIED LATERALITY, UNSPECIFIED PART OF LUNG (H): ICD-10-CM

## 2024-01-01 DIAGNOSIS — S61.412A SKIN TEAR OF LEFT HAND WITHOUT COMPLICATION, INITIAL ENCOUNTER: ICD-10-CM

## 2024-01-01 DIAGNOSIS — J18.9 PNEUMONIA OF BOTH LOWER LOBES DUE TO INFECTIOUS ORGANISM: ICD-10-CM

## 2024-01-01 DIAGNOSIS — H92.02 LEFT EAR PAIN: ICD-10-CM

## 2024-01-01 DIAGNOSIS — R53.1 WEAKNESS: ICD-10-CM

## 2024-01-01 DIAGNOSIS — N17.9 AKI (ACUTE KIDNEY INJURY) (H): ICD-10-CM

## 2024-01-01 DIAGNOSIS — I95.1 ORTHOSTATIC HYPOTENSION: ICD-10-CM

## 2024-01-01 DIAGNOSIS — J96.11 CHRONIC RESPIRATORY FAILURE WITH HYPOXIA (H): ICD-10-CM

## 2024-01-01 DIAGNOSIS — L03.114 CELLULITIS OF LEFT HAND: Primary | ICD-10-CM

## 2024-01-01 DIAGNOSIS — K92.2 UPPER GI BLEED: ICD-10-CM

## 2024-01-01 DIAGNOSIS — D50.0 IRON DEFICIENCY ANEMIA DUE TO CHRONIC BLOOD LOSS: ICD-10-CM

## 2024-01-01 DIAGNOSIS — R41.82 ALTERED MENTAL STATUS, UNSPECIFIED ALTERED MENTAL STATUS TYPE: ICD-10-CM

## 2024-01-01 DIAGNOSIS — E61.1 IRON DEFICIENCY: ICD-10-CM

## 2024-01-01 DIAGNOSIS — A04.72 COLITIS DUE TO CLOSTRIDIUM DIFFICILE: Primary | ICD-10-CM

## 2024-01-01 DIAGNOSIS — R19.7 DIARRHEA, UNSPECIFIED TYPE: ICD-10-CM

## 2024-01-01 DIAGNOSIS — R09.02 HYPOXIA: ICD-10-CM

## 2024-01-01 DIAGNOSIS — L29.9 CHRONIC PRURITUS: ICD-10-CM

## 2024-01-01 DIAGNOSIS — K21.9 GASTROESOPHAGEAL REFLUX DISEASE, UNSPECIFIED WHETHER ESOPHAGITIS PRESENT: ICD-10-CM

## 2024-01-01 DIAGNOSIS — D64.9 ANEMIA, UNSPECIFIED TYPE: ICD-10-CM

## 2024-01-01 DIAGNOSIS — G62.9 NEUROPATHY: ICD-10-CM

## 2024-01-01 LAB
ABO/RH(D): NORMAL
ACANTHOCYTES BLD QL SMEAR: ABNORMAL
ALBUMIN SERPL BCG-MCNC: 2.9 G/DL (ref 3.5–5.2)
ALBUMIN SERPL BCG-MCNC: 3 G/DL (ref 3.5–5.2)
ALBUMIN SERPL BCG-MCNC: 3.2 G/DL (ref 3.5–5.2)
ALBUMIN SERPL BCG-MCNC: 3.6 G/DL (ref 3.5–5.2)
ALBUMIN UR-MCNC: 20 MG/DL
ALBUMIN UR-MCNC: 20 MG/DL
ALBUMIN UR-MCNC: 50 MG/DL
ALBUMIN UR-MCNC: 50 MG/DL
ALP SERPL-CCNC: 102 U/L (ref 40–150)
ALP SERPL-CCNC: 89 U/L (ref 40–150)
ALP SERPL-CCNC: 90 U/L (ref 40–150)
ALP SERPL-CCNC: 93 U/L (ref 40–150)
ALT SERPL W P-5'-P-CCNC: 11 U/L (ref 0–70)
ALT SERPL W P-5'-P-CCNC: 15 U/L (ref 0–70)
ALT SERPL W P-5'-P-CCNC: 7 U/L (ref 0–70)
ALT SERPL W P-5'-P-CCNC: <5 U/L (ref 0–70)
AMMONIA PLAS-SCNC: 16 UMOL/L (ref 16–60)
AMPHETAMINES UR QL SCN: NORMAL
ANION GAP SERPL CALCULATED.3IONS-SCNC: 10 MMOL/L (ref 7–15)
ANION GAP SERPL CALCULATED.3IONS-SCNC: 11 MMOL/L (ref 7–15)
ANION GAP SERPL CALCULATED.3IONS-SCNC: 12 MMOL/L (ref 7–15)
ANION GAP SERPL CALCULATED.3IONS-SCNC: 13 MMOL/L (ref 7–15)
ANION GAP SERPL CALCULATED.3IONS-SCNC: 5 MMOL/L (ref 7–15)
ANION GAP SERPL CALCULATED.3IONS-SCNC: 5 MMOL/L (ref 7–15)
ANION GAP SERPL CALCULATED.3IONS-SCNC: 6 MMOL/L (ref 7–15)
ANION GAP SERPL CALCULATED.3IONS-SCNC: 7 MMOL/L (ref 7–15)
ANION GAP SERPL CALCULATED.3IONS-SCNC: 8 MMOL/L (ref 7–15)
ANION GAP SERPL CALCULATED.3IONS-SCNC: 8 MMOL/L (ref 7–15)
ANION GAP SERPL CALCULATED.3IONS-SCNC: 9 MMOL/L (ref 7–15)
ANTIBODY SCREEN: NEGATIVE
APPEARANCE FLD: CLEAR
APPEARANCE UR: CLEAR
AST SERPL W P-5'-P-CCNC: 17 U/L (ref 0–45)
AST SERPL W P-5'-P-CCNC: 34 U/L (ref 0–45)
AST SERPL W P-5'-P-CCNC: 60 U/L (ref 0–45)
AST SERPL W P-5'-P-CCNC: 7 U/L (ref 0–45)
ATRIAL RATE - MUSE: 68 BPM
ATRIAL RATE - MUSE: NORMAL BPM
AUER BODIES BLD QL SMEAR: ABNORMAL
B-OH-BUTYR SERPL-SCNC: <0.18 MMOL/L
BACTERIA BLD CULT: NO GROWTH
BACTERIA PLR CULT: NO GROWTH
BACTERIA UR CULT: NORMAL
BARBITURATES UR QL SCN: NORMAL
BASE EXCESS BLDV CALC-SCNC: 3.4 MMOL/L (ref -7.7–1.9)
BASE EXCESS BLDV CALC-SCNC: 8.7 MMOL/L (ref -3–3)
BASO STIPL BLD QL SMEAR: ABNORMAL
BASOPHILS # BLD AUTO: 0 10E3/UL (ref 0–0.2)
BASOPHILS NFR BLD AUTO: 0 %
BENZODIAZ UR QL SCN: NORMAL
BILIRUB SERPL-MCNC: 0.3 MG/DL
BILIRUB SERPL-MCNC: 0.6 MG/DL
BILIRUB SERPL-MCNC: 0.9 MG/DL
BILIRUB SERPL-MCNC: 0.9 MG/DL
BILIRUB UR QL STRIP: NEGATIVE
BITE CELLS BLD QL SMEAR: ABNORMAL
BLISTER CELLS BLD QL SMEAR: ABNORMAL
BUN SERPL-MCNC: 11.4 MG/DL (ref 8–23)
BUN SERPL-MCNC: 11.8 MG/DL (ref 8–23)
BUN SERPL-MCNC: 12.5 MG/DL (ref 8–23)
BUN SERPL-MCNC: 12.7 MG/DL (ref 8–23)
BUN SERPL-MCNC: 12.9 MG/DL (ref 8–23)
BUN SERPL-MCNC: 13.4 MG/DL (ref 8–23)
BUN SERPL-MCNC: 13.6 MG/DL (ref 8–23)
BUN SERPL-MCNC: 17.5 MG/DL (ref 8–23)
BUN SERPL-MCNC: 18.3 MG/DL (ref 8–23)
BUN SERPL-MCNC: 19.4 MG/DL (ref 8–23)
BUN SERPL-MCNC: 19.4 MG/DL (ref 8–23)
BUN SERPL-MCNC: 19.8 MG/DL (ref 8–23)
BUN SERPL-MCNC: 20.9 MG/DL (ref 8–23)
BUN SERPL-MCNC: 21 MG/DL (ref 8–23)
BUN SERPL-MCNC: 23.6 MG/DL (ref 8–23)
BUN SERPL-MCNC: 23.8 MG/DL (ref 8–23)
BUN SERPL-MCNC: 26.1 MG/DL (ref 8–23)
BURR CELLS BLD QL SMEAR: SLIGHT
BZE UR QL SCN: NORMAL
C DIFF TOX B STL QL: NEGATIVE
CALCIUM SERPL-MCNC: 8 MG/DL (ref 8.2–9.6)
CALCIUM SERPL-MCNC: 8.2 MG/DL (ref 8.2–9.6)
CALCIUM SERPL-MCNC: 8.3 MG/DL (ref 8.2–9.6)
CALCIUM SERPL-MCNC: 8.3 MG/DL (ref 8.2–9.6)
CALCIUM SERPL-MCNC: 8.4 MG/DL (ref 8.2–9.6)
CALCIUM SERPL-MCNC: 8.5 MG/DL (ref 8.2–9.6)
CALCIUM SERPL-MCNC: 8.6 MG/DL (ref 8.2–9.6)
CALCIUM SERPL-MCNC: 8.8 MG/DL (ref 8.2–9.6)
CALCIUM SERPL-MCNC: 8.9 MG/DL (ref 8.2–9.6)
CALCIUM SERPL-MCNC: 8.9 MG/DL (ref 8.2–9.6)
CALCIUM SERPL-MCNC: 9 MG/DL (ref 8.2–9.6)
CALCIUM SERPL-MCNC: 9.6 MG/DL (ref 8.2–9.6)
CANNABINOIDS UR QL SCN: NORMAL
CELL COUNT BODY FLUID SOURCE: ABNORMAL
CHLORIDE SERPL-SCNC: 100 MMOL/L (ref 98–107)
CHLORIDE SERPL-SCNC: 100 MMOL/L (ref 98–107)
CHLORIDE SERPL-SCNC: 102 MMOL/L (ref 98–107)
CHLORIDE SERPL-SCNC: 103 MMOL/L (ref 98–107)
CHLORIDE SERPL-SCNC: 105 MMOL/L (ref 98–107)
CHLORIDE SERPL-SCNC: 106 MMOL/L (ref 98–107)
CHLORIDE SERPL-SCNC: 106 MMOL/L (ref 98–107)
CHLORIDE SERPL-SCNC: 107 MMOL/L (ref 98–107)
CHLORIDE SERPL-SCNC: 108 MMOL/L (ref 98–107)
CHLORIDE SERPL-SCNC: 109 MMOL/L (ref 98–107)
CHLORIDE SERPL-SCNC: 98 MMOL/L (ref 98–107)
CHLORIDE SERPL-SCNC: 99 MMOL/L (ref 98–107)
CHLORIDE SERPL-SCNC: 99 MMOL/L (ref 98–107)
CK SERPL-CCNC: 626 U/L (ref 39–308)
COLOR FLD: ABNORMAL
COLOR UR AUTO: YELLOW
CREAT SERPL-MCNC: 0.78 MG/DL (ref 0.67–1.17)
CREAT SERPL-MCNC: 0.79 MG/DL (ref 0.67–1.17)
CREAT SERPL-MCNC: 0.8 MG/DL (ref 0.67–1.17)
CREAT SERPL-MCNC: 0.8 MG/DL (ref 0.67–1.17)
CREAT SERPL-MCNC: 0.81 MG/DL (ref 0.67–1.17)
CREAT SERPL-MCNC: 0.84 MG/DL (ref 0.67–1.17)
CREAT SERPL-MCNC: 0.85 MG/DL (ref 0.67–1.17)
CREAT SERPL-MCNC: 0.97 MG/DL (ref 0.67–1.17)
CREAT SERPL-MCNC: 1 MG/DL (ref 0.67–1.17)
CREAT SERPL-MCNC: 1.01 MG/DL (ref 0.67–1.17)
CREAT SERPL-MCNC: 1.09 MG/DL (ref 0.67–1.17)
CREAT SERPL-MCNC: 1.14 MG/DL (ref 0.67–1.17)
CREAT SERPL-MCNC: 1.25 MG/DL (ref 0.67–1.17)
CREAT SERPL-MCNC: 1.31 MG/DL (ref 0.67–1.17)
CREAT SERPL-MCNC: 1.36 MG/DL (ref 0.67–1.17)
CREAT SERPL-MCNC: 1.5 MG/DL (ref 0.67–1.17)
CREAT SERPL-MCNC: 1.51 MG/DL (ref 0.67–1.17)
DACRYOCYTES BLD QL SMEAR: ABNORMAL
DEPRECATED HCO3 PLAS-SCNC: 24 MMOL/L (ref 22–29)
DEPRECATED HCO3 PLAS-SCNC: 25 MMOL/L (ref 22–29)
DEPRECATED HCO3 PLAS-SCNC: 26 MMOL/L (ref 22–29)
DEPRECATED HCO3 PLAS-SCNC: 27 MMOL/L (ref 22–29)
DEPRECATED HCO3 PLAS-SCNC: 28 MMOL/L (ref 22–29)
DEPRECATED HCO3 PLAS-SCNC: 28 MMOL/L (ref 22–29)
DEPRECATED HCO3 PLAS-SCNC: 29 MMOL/L (ref 22–29)
DEPRECATED HCO3 PLAS-SCNC: 30 MMOL/L (ref 22–29)
DEPRECATED HCO3 PLAS-SCNC: 31 MMOL/L (ref 22–29)
DEPRECATED HCO3 PLAS-SCNC: 32 MMOL/L (ref 22–29)
DEPRECATED HCO3 PLAS-SCNC: 33 MMOL/L (ref 22–29)
DIASTOLIC BLOOD PRESSURE - MUSE: NORMAL MMHG
EGFRCR SERPLBLD CKD-EPI 2021: 43 ML/MIN/1.73M2
EGFRCR SERPLBLD CKD-EPI 2021: 44 ML/MIN/1.73M2
EGFRCR SERPLBLD CKD-EPI 2021: 49 ML/MIN/1.73M2
EGFRCR SERPLBLD CKD-EPI 2021: 51 ML/MIN/1.73M2
EGFRCR SERPLBLD CKD-EPI 2021: 54 ML/MIN/1.73M2
EGFRCR SERPLBLD CKD-EPI 2021: 61 ML/MIN/1.73M2
EGFRCR SERPLBLD CKD-EPI 2021: 64 ML/MIN/1.73M2
EGFRCR SERPLBLD CKD-EPI 2021: 70 ML/MIN/1.73M2
EGFRCR SERPLBLD CKD-EPI 2021: 71 ML/MIN/1.73M2
EGFRCR SERPLBLD CKD-EPI 2021: 74 ML/MIN/1.73M2
EGFRCR SERPLBLD CKD-EPI 2021: 82 ML/MIN/1.73M2
EGFRCR SERPLBLD CKD-EPI 2021: 82 ML/MIN/1.73M2
EGFRCR SERPLBLD CKD-EPI 2021: 83 ML/MIN/1.73M2
EGFRCR SERPLBLD CKD-EPI 2021: 84 ML/MIN/1.73M2
ELLIPTOCYTES BLD QL SMEAR: ABNORMAL
EOSINOPHIL # BLD AUTO: 0 10E3/UL (ref 0–0.7)
EOSINOPHIL # BLD AUTO: 0.1 10E3/UL (ref 0–0.7)
EOSINOPHIL # BLD AUTO: 0.2 10E3/UL (ref 0–0.7)
EOSINOPHIL # BLD AUTO: 0.3 10E3/UL (ref 0–0.7)
EOSINOPHIL # BLD AUTO: 0.6 10E3/UL (ref 0–0.7)
EOSINOPHIL # BLD AUTO: 0.8 10E3/UL (ref 0–0.7)
EOSINOPHIL NFR BLD AUTO: 0 %
EOSINOPHIL NFR BLD AUTO: 13 %
EOSINOPHIL NFR BLD AUTO: 2 %
EOSINOPHIL NFR BLD AUTO: 3 %
EOSINOPHIL NFR BLD AUTO: 6 %
EOSINOPHIL NFR BLD AUTO: 8 %
ERYTHROCYTE [DISTWIDTH] IN BLOOD BY AUTOMATED COUNT: 16.3 % (ref 10–15)
ERYTHROCYTE [DISTWIDTH] IN BLOOD BY AUTOMATED COUNT: 16.3 % (ref 10–15)
ERYTHROCYTE [DISTWIDTH] IN BLOOD BY AUTOMATED COUNT: 16.5 % (ref 10–15)
ERYTHROCYTE [DISTWIDTH] IN BLOOD BY AUTOMATED COUNT: 16.6 % (ref 10–15)
ERYTHROCYTE [DISTWIDTH] IN BLOOD BY AUTOMATED COUNT: 16.9 % (ref 10–15)
ERYTHROCYTE [DISTWIDTH] IN BLOOD BY AUTOMATED COUNT: 16.9 % (ref 10–15)
ERYTHROCYTE [DISTWIDTH] IN BLOOD BY AUTOMATED COUNT: 17.2 % (ref 10–15)
ERYTHROCYTE [DISTWIDTH] IN BLOOD BY AUTOMATED COUNT: 17.4 % (ref 10–15)
ERYTHROCYTE [DISTWIDTH] IN BLOOD BY AUTOMATED COUNT: 17.6 % (ref 10–15)
ERYTHROCYTE [DISTWIDTH] IN BLOOD BY AUTOMATED COUNT: 17.6 % (ref 10–15)
ERYTHROCYTE [DISTWIDTH] IN BLOOD BY AUTOMATED COUNT: 17.7 % (ref 10–15)
ERYTHROCYTE [DISTWIDTH] IN BLOOD BY AUTOMATED COUNT: 17.9 % (ref 10–15)
ERYTHROCYTE [DISTWIDTH] IN BLOOD BY AUTOMATED COUNT: 18 % (ref 10–15)
ERYTHROCYTE [DISTWIDTH] IN BLOOD BY AUTOMATED COUNT: 18 % (ref 10–15)
FENTANYL UR QL: NORMAL
FLUAV RNA SPEC QL NAA+PROBE: NEGATIVE
FLUAV RNA SPEC QL NAA+PROBE: NEGATIVE
FLUBV RNA RESP QL NAA+PROBE: NEGATIVE
FLUBV RNA RESP QL NAA+PROBE: NEGATIVE
FRAGMENTS BLD QL SMEAR: ABNORMAL
GLUCOSE BODY FLUID SOURCE: NORMAL
GLUCOSE FLD-MCNC: 125 MG/DL
GLUCOSE SERPL-MCNC: 100 MG/DL (ref 70–99)
GLUCOSE SERPL-MCNC: 100 MG/DL (ref 70–99)
GLUCOSE SERPL-MCNC: 105 MG/DL (ref 70–99)
GLUCOSE SERPL-MCNC: 105 MG/DL (ref 70–99)
GLUCOSE SERPL-MCNC: 106 MG/DL (ref 70–99)
GLUCOSE SERPL-MCNC: 107 MG/DL (ref 70–99)
GLUCOSE SERPL-MCNC: 114 MG/DL (ref 70–99)
GLUCOSE SERPL-MCNC: 115 MG/DL (ref 70–99)
GLUCOSE SERPL-MCNC: 116 MG/DL (ref 70–99)
GLUCOSE SERPL-MCNC: 120 MG/DL (ref 70–99)
GLUCOSE SERPL-MCNC: 140 MG/DL (ref 70–99)
GLUCOSE SERPL-MCNC: 155 MG/DL (ref 70–99)
GLUCOSE SERPL-MCNC: 178 MG/DL (ref 70–99)
GLUCOSE SERPL-MCNC: 79 MG/DL (ref 70–99)
GLUCOSE SERPL-MCNC: 93 MG/DL (ref 70–99)
GLUCOSE SERPL-MCNC: 97 MG/DL (ref 70–99)
GLUCOSE SERPL-MCNC: 98 MG/DL (ref 70–99)
GLUCOSE UR STRIP-MCNC: 30 MG/DL
GLUCOSE UR STRIP-MCNC: NEGATIVE MG/DL
GRAM STAIN RESULT: NORMAL
GRAM STAIN RESULT: NORMAL
HCO3 BLDV-SCNC: 26 MMOL/L (ref 21–28)
HCO3 BLDV-SCNC: 29 MMOL/L (ref 21–28)
HCO3 BLDV-SCNC: 30 MMOL/L (ref 21–28)
HCO3 BLDV-SCNC: 35 MMOL/L (ref 21–28)
HCO3 BLDV-SCNC: 38 MMOL/L (ref 21–28)
HCT VFR BLD AUTO: 27.7 % (ref 40–53)
HCT VFR BLD AUTO: 28.2 % (ref 40–53)
HCT VFR BLD AUTO: 28.5 % (ref 40–53)
HCT VFR BLD AUTO: 28.6 % (ref 40–53)
HCT VFR BLD AUTO: 28.7 % (ref 40–53)
HCT VFR BLD AUTO: 29.1 % (ref 40–53)
HCT VFR BLD AUTO: 29.1 % (ref 40–53)
HCT VFR BLD AUTO: 29.3 % (ref 40–53)
HCT VFR BLD AUTO: 29.6 % (ref 40–53)
HCT VFR BLD AUTO: 29.6 % (ref 40–53)
HCT VFR BLD AUTO: 31.3 % (ref 40–53)
HCT VFR BLD AUTO: 31.5 % (ref 40–53)
HCT VFR BLD AUTO: 31.9 % (ref 40–53)
HCT VFR BLD AUTO: 33.8 % (ref 40–53)
HCT VFR BLD AUTO: 35.7 % (ref 40–53)
HCT VFR BLD AUTO: 36.1 % (ref 40–53)
HCT VFR BLD AUTO: 36.4 % (ref 40–53)
HGB BLD-MCNC: 10 G/DL (ref 13.3–17.7)
HGB BLD-MCNC: 10.4 G/DL (ref 13.3–17.7)
HGB BLD-MCNC: 11 G/DL (ref 13.3–17.7)
HGB BLD-MCNC: 11 G/DL (ref 13.3–17.7)
HGB BLD-MCNC: 11.1 G/DL (ref 13.3–17.7)
HGB BLD-MCNC: 8.5 G/DL (ref 13.3–17.7)
HGB BLD-MCNC: 8.7 G/DL (ref 13.3–17.7)
HGB BLD-MCNC: 8.8 G/DL (ref 13.3–17.7)
HGB BLD-MCNC: 8.8 G/DL (ref 13.3–17.7)
HGB BLD-MCNC: 8.9 G/DL (ref 13.3–17.7)
HGB BLD-MCNC: 9 G/DL (ref 13.3–17.7)
HGB BLD-MCNC: 9 G/DL (ref 13.3–17.7)
HGB BLD-MCNC: 9.1 G/DL (ref 13.3–17.7)
HGB BLD-MCNC: 9.1 G/DL (ref 13.3–17.7)
HGB BLD-MCNC: 9.2 G/DL (ref 13.3–17.7)
HGB BLD-MCNC: 9.3 G/DL (ref 13.3–17.7)
HGB BLD-MCNC: 9.4 G/DL (ref 13.3–17.7)
HGB BLD-MCNC: 9.4 G/DL (ref 13.3–17.7)
HGB BLD-MCNC: 9.5 G/DL (ref 13.3–17.7)
HGB BLD-MCNC: 9.5 G/DL (ref 13.3–17.7)
HGB BLD-MCNC: 9.9 G/DL (ref 13.3–17.7)
HGB C CRYSTALS: ABNORMAL
HGB UR QL STRIP: ABNORMAL
HGB UR QL STRIP: ABNORMAL
HGB UR QL STRIP: NEGATIVE
HGB UR QL STRIP: NEGATIVE
HOLD SPECIMEN: NORMAL
HOWELL-JOLLY BOD BLD QL SMEAR: ABNORMAL
HYALINE CASTS: 11 /LPF
IMM GRANULOCYTES # BLD: 0 10E3/UL
IMM GRANULOCYTES # BLD: 0.1 10E3/UL
IMM GRANULOCYTES # BLD: 0.1 10E3/UL
IMM GRANULOCYTES NFR BLD: 0 %
IMM GRANULOCYTES NFR BLD: 1 %
IMM GRANULOCYTES NFR BLD: 1 %
INR PPP: 1.37 (ref 0.85–1.15)
INR PPP: 1.53 (ref 0.85–1.15)
INTERPRETATION ECG - MUSE: NORMAL
KETONES UR STRIP-MCNC: ABNORMAL MG/DL
KETONES UR STRIP-MCNC: NEGATIVE MG/DL
LACTATE BLD-SCNC: 0.8 MMOL/L
LACTATE BLD-SCNC: 1.8 MMOL/L
LACTATE BLD-SCNC: 1.9 MMOL/L
LACTATE SERPL-SCNC: 1 MMOL/L (ref 0.7–2)
LACTATE SERPL-SCNC: 1.5 MMOL/L (ref 0.7–2)
LACTATE SERPL-SCNC: 1.5 MMOL/L (ref 0.7–2)
LACTATE SERPL-SCNC: 1.6 MMOL/L (ref 0.7–2)
LACTATE SERPL-SCNC: 1.8 MMOL/L (ref 0.7–2)
LD BODY BODY FLUID SOURCE: NORMAL
LDH FLD L TO P-CCNC: 91 U/L
LDH SERPL L TO P-CCNC: 183 U/L (ref 0–250)
LEUKOCYTE ESTERASE UR QL STRIP: NEGATIVE
LIPASE SERPL-CCNC: 13 U/L (ref 13–60)
LVEF ECHO: NORMAL
LYMPHOCYTES # BLD AUTO: 0.4 10E3/UL (ref 0.8–5.3)
LYMPHOCYTES # BLD AUTO: 0.5 10E3/UL (ref 0.8–5.3)
LYMPHOCYTES # BLD AUTO: 0.7 10E3/UL (ref 0.8–5.3)
LYMPHOCYTES # BLD AUTO: 0.7 10E3/UL (ref 0.8–5.3)
LYMPHOCYTES # BLD AUTO: 0.8 10E3/UL (ref 0.8–5.3)
LYMPHOCYTES # BLD AUTO: 0.9 10E3/UL (ref 0.8–5.3)
LYMPHOCYTES NFR BLD AUTO: 12 %
LYMPHOCYTES NFR BLD AUTO: 13 %
LYMPHOCYTES NFR BLD AUTO: 16 %
LYMPHOCYTES NFR BLD AUTO: 2 %
LYMPHOCYTES NFR BLD AUTO: 3 %
LYMPHOCYTES NFR BLD AUTO: 5 %
LYMPHOCYTES NFR BLD AUTO: 7 %
LYMPHOCYTES NFR BLD AUTO: 8 %
LYMPHOCYTES NFR FLD MANUAL: NORMAL %
MAGNESIUM SERPL-MCNC: 1.7 MG/DL (ref 1.7–2.3)
MAGNESIUM SERPL-MCNC: 1.8 MG/DL (ref 1.7–2.3)
MAGNESIUM SERPL-MCNC: 1.9 MG/DL (ref 1.7–2.3)
MAGNESIUM SERPL-MCNC: 2 MG/DL (ref 1.7–2.3)
MCH RBC QN AUTO: 25.5 PG (ref 26.5–33)
MCH RBC QN AUTO: 25.5 PG (ref 26.5–33)
MCH RBC QN AUTO: 25.6 PG (ref 26.5–33)
MCH RBC QN AUTO: 25.7 PG (ref 26.5–33)
MCH RBC QN AUTO: 25.7 PG (ref 26.5–33)
MCH RBC QN AUTO: 25.8 PG (ref 26.5–33)
MCH RBC QN AUTO: 25.8 PG (ref 26.5–33)
MCH RBC QN AUTO: 25.9 PG (ref 26.5–33)
MCH RBC QN AUTO: 26 PG (ref 26.5–33)
MCH RBC QN AUTO: 26.1 PG (ref 26.5–33)
MCH RBC QN AUTO: 26.2 PG (ref 26.5–33)
MCH RBC QN AUTO: 26.3 PG (ref 26.5–33)
MCH RBC QN AUTO: 26.3 PG (ref 26.5–33)
MCHC RBC AUTO-ENTMCNC: 30 G/DL (ref 31.5–36.5)
MCHC RBC AUTO-ENTMCNC: 30.3 G/DL (ref 31.5–36.5)
MCHC RBC AUTO-ENTMCNC: 30.4 G/DL (ref 31.5–36.5)
MCHC RBC AUTO-ENTMCNC: 30.5 G/DL (ref 31.5–36.5)
MCHC RBC AUTO-ENTMCNC: 30.5 G/DL (ref 31.5–36.5)
MCHC RBC AUTO-ENTMCNC: 30.7 G/DL (ref 31.5–36.5)
MCHC RBC AUTO-ENTMCNC: 30.8 G/DL (ref 31.5–36.5)
MCHC RBC AUTO-ENTMCNC: 30.8 G/DL (ref 31.5–36.5)
MCHC RBC AUTO-ENTMCNC: 30.9 G/DL (ref 31.5–36.5)
MCHC RBC AUTO-ENTMCNC: 30.9 G/DL (ref 31.5–36.5)
MCHC RBC AUTO-ENTMCNC: 31.1 G/DL (ref 31.5–36.5)
MCHC RBC AUTO-ENTMCNC: 31.3 G/DL (ref 31.5–36.5)
MCHC RBC AUTO-ENTMCNC: 31.3 G/DL (ref 31.5–36.5)
MCHC RBC AUTO-ENTMCNC: 31.4 G/DL (ref 31.5–36.5)
MCHC RBC AUTO-ENTMCNC: 31.6 G/DL (ref 31.5–36.5)
MCHC RBC AUTO-ENTMCNC: 31.7 G/DL (ref 31.5–36.5)
MCHC RBC AUTO-ENTMCNC: 31.8 G/DL (ref 31.5–36.5)
MCV RBC AUTO: 81 FL (ref 78–100)
MCV RBC AUTO: 82 FL (ref 78–100)
MCV RBC AUTO: 83 FL (ref 78–100)
MCV RBC AUTO: 84 FL (ref 78–100)
MCV RBC AUTO: 85 FL (ref 78–100)
MCV RBC AUTO: 86 FL (ref 78–100)
MONOCYTES # BLD AUTO: 0.5 10E3/UL (ref 0–1.3)
MONOCYTES # BLD AUTO: 0.6 10E3/UL (ref 0–1.3)
MONOCYTES # BLD AUTO: 0.7 10E3/UL (ref 0–1.3)
MONOCYTES # BLD AUTO: 0.7 10E3/UL (ref 0–1.3)
MONOCYTES # BLD AUTO: 0.8 10E3/UL (ref 0–1.3)
MONOCYTES # BLD AUTO: 0.9 10E3/UL (ref 0–1.3)
MONOCYTES # BLD AUTO: 1 10E3/UL (ref 0–1.3)
MONOCYTES # BLD AUTO: 1.2 10E3/UL (ref 0–1.3)
MONOCYTES NFR BLD AUTO: 10 %
MONOCYTES NFR BLD AUTO: 10 %
MONOCYTES NFR BLD AUTO: 11 %
MONOCYTES NFR BLD AUTO: 11 %
MONOCYTES NFR BLD AUTO: 12 %
MONOCYTES NFR BLD AUTO: 6 %
MONOCYTES NFR BLD AUTO: 7 %
MONOCYTES NFR BLD AUTO: 9 %
MONOS+MACROS NFR FLD MANUAL: NORMAL %
MRSA DNA SPEC QL NAA+PROBE: NEGATIVE
MUCOUS THREADS #/AREA URNS LPF: PRESENT /LPF
NEUTROPHILS # BLD AUTO: 13 10E3/UL (ref 1.6–8.3)
NEUTROPHILS # BLD AUTO: 15.5 10E3/UL (ref 1.6–8.3)
NEUTROPHILS # BLD AUTO: 3.5 10E3/UL (ref 1.6–8.3)
NEUTROPHILS # BLD AUTO: 3.9 10E3/UL (ref 1.6–8.3)
NEUTROPHILS # BLD AUTO: 5.2 10E3/UL (ref 1.6–8.3)
NEUTROPHILS # BLD AUTO: 5.3 10E3/UL (ref 1.6–8.3)
NEUTROPHILS # BLD AUTO: 5.9 10E3/UL (ref 1.6–8.3)
NEUTROPHILS # BLD AUTO: 8.4 10E3/UL (ref 1.6–8.3)
NEUTROPHILS # BLD AUTO: 8.6 10E3/UL (ref 1.6–8.3)
NEUTROPHILS # BLD AUTO: 9 10E3/UL (ref 1.6–8.3)
NEUTROPHILS NFR BLD AUTO: 63 %
NEUTROPHILS NFR BLD AUTO: 68 %
NEUTROPHILS NFR BLD AUTO: 74 %
NEUTROPHILS NFR BLD AUTO: 75 %
NEUTROPHILS NFR BLD AUTO: 78 %
NEUTROPHILS NFR BLD AUTO: 84 %
NEUTROPHILS NFR BLD AUTO: 84 %
NEUTROPHILS NFR BLD AUTO: 87 %
NEUTROPHILS NFR BLD AUTO: 89 %
NEUTROPHILS NFR BLD AUTO: 90 %
NEUTS BAND NFR FLD MANUAL: NORMAL %
NEUTS HYPERSEG BLD QL SMEAR: ABNORMAL
NITRATE UR QL: NEGATIVE
NRBC # BLD AUTO: 0 10E3/UL
NRBC BLD AUTO-RTO: 0 /100
NT-PROBNP SERPL-MCNC: ABNORMAL PG/ML (ref 0–1800)
NT-PROBNP SERPL-MCNC: ABNORMAL PG/ML (ref 0–1800)
O2/TOTAL GAS SETTING VFR VENT: 25 %
O2/TOTAL GAS SETTING VFR VENT: 99 %
OPIATES UR QL SCN: NORMAL
OXYHGB MFR BLDV: 38 % (ref 70–75)
P AXIS - MUSE: 85 DEGREES
P AXIS - MUSE: NORMAL DEGREES
PATH REPORT.COMMENTS IMP SPEC: NORMAL
PATH REPORT.FINAL DX SPEC: NORMAL
PATH REPORT.GROSS SPEC: NORMAL
PATH REPORT.MICROSCOPIC SPEC OTHER STN: NORMAL
PATH REPORT.RELEVANT HX SPEC: NORMAL
PCO2 BLDV: 52 MM HG (ref 40–50)
PCO2 BLDV: 52 MM HG (ref 40–50)
PCO2 BLDV: 57 MM HG (ref 40–50)
PCO2 BLDV: 60 MM HG (ref 40–50)
PCO2 BLDV: 67 MM HG (ref 40–50)
PCP QUAL URINE (ROCHE): NORMAL
PH BLDV: 7.26 [PH] (ref 7.32–7.43)
PH BLDV: 7.35 [PH] (ref 7.32–7.43)
PH BLDV: 7.36 [PH] (ref 7.32–7.43)
PH BLDV: 7.37 [PH] (ref 7.32–7.43)
PH BLDV: 7.38 [PH] (ref 7.32–7.43)
PH UR STRIP: 5 [PH] (ref 5–7)
PH UR STRIP: 5.5 [PH] (ref 5–7)
PH UR STRIP: 5.5 [PH] (ref 5–7)
PH UR STRIP: 6 [PH] (ref 5–7)
PHOSPHATE SERPL-MCNC: 3.6 MG/DL (ref 2.5–4.5)
PLAT MORPH BLD: ABNORMAL
PLATELET # BLD AUTO: 120 10E3/UL (ref 150–450)
PLATELET # BLD AUTO: 120 10E3/UL (ref 150–450)
PLATELET # BLD AUTO: 122 10E3/UL (ref 150–450)
PLATELET # BLD AUTO: 126 10E3/UL (ref 150–450)
PLATELET # BLD AUTO: 126 10E3/UL (ref 150–450)
PLATELET # BLD AUTO: 129 10E3/UL (ref 150–450)
PLATELET # BLD AUTO: 132 10E3/UL (ref 150–450)
PLATELET # BLD AUTO: 142 10E3/UL (ref 150–450)
PLATELET # BLD AUTO: 142 10E3/UL (ref 150–450)
PLATELET # BLD AUTO: 144 10E3/UL (ref 150–450)
PLATELET # BLD AUTO: 145 10E3/UL (ref 150–450)
PLATELET # BLD AUTO: 152 10E3/UL (ref 150–450)
PLATELET # BLD AUTO: 159 10E3/UL (ref 150–450)
PLATELET # BLD AUTO: 162 10E3/UL (ref 150–450)
PLATELET # BLD AUTO: 164 10E3/UL (ref 150–450)
PLATELET # BLD AUTO: 180 10E3/UL (ref 150–450)
PLATELET # BLD AUTO: 96 10E3/UL (ref 150–450)
PO2 BLDV: 18 MM HG (ref 25–47)
PO2 BLDV: 25 MM HG (ref 25–47)
PO2 BLDV: 27 MM HG (ref 25–47)
PO2 BLDV: 46 MM HG (ref 25–47)
PO2 BLDV: 64 MM HG (ref 25–47)
POLYCHROMASIA BLD QL SMEAR: ABNORMAL
POTASSIUM SERPL-SCNC: 2.9 MMOL/L (ref 3.4–5.3)
POTASSIUM SERPL-SCNC: 3 MMOL/L (ref 3.4–5.3)
POTASSIUM SERPL-SCNC: 3.1 MMOL/L (ref 3.4–5.3)
POTASSIUM SERPL-SCNC: 3.2 MMOL/L (ref 3.4–5.3)
POTASSIUM SERPL-SCNC: 3.2 MMOL/L (ref 3.4–5.3)
POTASSIUM SERPL-SCNC: 3.4 MMOL/L (ref 3.4–5.3)
POTASSIUM SERPL-SCNC: 3.4 MMOL/L (ref 3.4–5.3)
POTASSIUM SERPL-SCNC: 3.5 MMOL/L (ref 3.4–5.3)
POTASSIUM SERPL-SCNC: 3.5 MMOL/L (ref 3.4–5.3)
POTASSIUM SERPL-SCNC: 3.6 MMOL/L (ref 3.4–5.3)
POTASSIUM SERPL-SCNC: 3.7 MMOL/L (ref 3.4–5.3)
POTASSIUM SERPL-SCNC: 3.7 MMOL/L (ref 3.4–5.3)
POTASSIUM SERPL-SCNC: 3.8 MMOL/L (ref 3.4–5.3)
POTASSIUM SERPL-SCNC: 3.9 MMOL/L (ref 3.4–5.3)
POTASSIUM SERPL-SCNC: 3.9 MMOL/L (ref 3.4–5.3)
POTASSIUM SERPL-SCNC: 4 MMOL/L (ref 3.4–5.3)
POTASSIUM SERPL-SCNC: 4 MMOL/L (ref 3.4–5.3)
POTASSIUM SERPL-SCNC: 4.3 MMOL/L (ref 3.4–5.3)
POTASSIUM SERPL-SCNC: 4.5 MMOL/L (ref 3.4–5.3)
POTASSIUM SERPL-SCNC: 4.7 MMOL/L (ref 3.4–5.3)
PR INTERVAL - MUSE: 214 MS
PR INTERVAL - MUSE: NORMAL MS
PROCALCITONIN SERPL IA-MCNC: 0.79 NG/ML
PROCALCITONIN SERPL IA-MCNC: 21.39 NG/ML
PROT FLD-MCNC: 2.4 G/DL
PROT SERPL-MCNC: 5.5 G/DL (ref 6.4–8.3)
PROT SERPL-MCNC: 6 G/DL (ref 6.4–8.3)
PROT SERPL-MCNC: 6.8 G/DL (ref 6.4–8.3)
PROT SERPL-MCNC: 7.2 G/DL (ref 6.4–8.3)
PROT SERPL-MCNC: 7.4 G/DL (ref 6.4–8.3)
PROTEIN BODY FLUID SOURCE: NORMAL
QRS DURATION - MUSE: 132 MS
QRS DURATION - MUSE: 140 MS
QRS DURATION - MUSE: 144 MS
QRS DURATION - MUSE: 148 MS
QT - MUSE: 326 MS
QT - MUSE: 390 MS
QT - MUSE: 434 MS
QT - MUSE: 476 MS
QTC - MUSE: 441 MS
QTC - MUSE: 495 MS
QTC - MUSE: 506 MS
QTC - MUSE: 519 MS
R AXIS - MUSE: -12 DEGREES
R AXIS - MUSE: -16 DEGREES
R AXIS - MUSE: -42 DEGREES
R AXIS - MUSE: -63 DEGREES
RBC # BLD AUTO: 3.36 10E6/UL (ref 4.4–5.9)
RBC # BLD AUTO: 3.37 10E6/UL (ref 4.4–5.9)
RBC # BLD AUTO: 3.39 10E6/UL (ref 4.4–5.9)
RBC # BLD AUTO: 3.39 10E6/UL (ref 4.4–5.9)
RBC # BLD AUTO: 3.43 10E6/UL (ref 4.4–5.9)
RBC # BLD AUTO: 3.49 10E6/UL (ref 4.4–5.9)
RBC # BLD AUTO: 3.52 10E6/UL (ref 4.4–5.9)
RBC # BLD AUTO: 3.53 10E6/UL (ref 4.4–5.9)
RBC # BLD AUTO: 3.53 10E6/UL (ref 4.4–5.9)
RBC # BLD AUTO: 3.59 10E6/UL (ref 4.4–5.9)
RBC # BLD AUTO: 3.66 10E6/UL (ref 4.4–5.9)
RBC # BLD AUTO: 3.77 10E6/UL (ref 4.4–5.9)
RBC # BLD AUTO: 3.86 10E6/UL (ref 4.4–5.9)
RBC # BLD AUTO: 4.02 10E6/UL (ref 4.4–5.9)
RBC # BLD AUTO: 4.31 10E6/UL (ref 4.4–5.9)
RBC # BLD AUTO: 4.31 10E6/UL (ref 4.4–5.9)
RBC # BLD AUTO: 4.33 10E6/UL (ref 4.4–5.9)
RBC AGGLUT BLD QL: ABNORMAL
RBC MORPH BLD: ABNORMAL
RBC URINE: 0 /HPF
RBC URINE: 3 /HPF
RBC URINE: 35 /HPF
RBC URINE: 7 /HPF
ROULEAUX BLD QL SMEAR: ABNORMAL
RSV RNA SPEC NAA+PROBE: NEGATIVE
RSV RNA SPEC NAA+PROBE: NEGATIVE
SA TARGET DNA: NEGATIVE
SAO2 % BLDV: 23 % (ref 70–75)
SAO2 % BLDV: 39.1 % (ref 70–75)
SAO2 % BLDV: 41 % (ref 70–75)
SAO2 % BLDV: 74 % (ref 94–100)
SARS-COV-2 RNA RESP QL NAA+PROBE: NEGATIVE
SARS-COV-2 RNA RESP QL NAA+PROBE: NEGATIVE
SICKLE CELLS BLD QL SMEAR: ABNORMAL
SMUDGE CELLS BLD QL SMEAR: ABNORMAL
SODIUM SERPL-SCNC: 138 MMOL/L (ref 135–145)
SODIUM SERPL-SCNC: 139 MMOL/L (ref 135–145)
SODIUM SERPL-SCNC: 140 MMOL/L (ref 135–145)
SODIUM SERPL-SCNC: 141 MMOL/L (ref 135–145)
SODIUM SERPL-SCNC: 142 MMOL/L (ref 135–145)
SODIUM SERPL-SCNC: 143 MMOL/L (ref 135–145)
SODIUM SERPL-SCNC: 144 MMOL/L (ref 135–145)
SP GR UR STRIP: 1.02 (ref 1–1.03)
SP GR UR STRIP: 1.03 (ref 1–1.03)
SPECIMEN EXPIRATION DATE: NORMAL
SPHEROCYTES BLD QL SMEAR: ABNORMAL
SQUAMOUS EPITHELIAL: 1 /HPF
SQUAMOUS EPITHELIAL: <1 /HPF
STOMATOCYTES BLD QL SMEAR: ABNORMAL
SYSTOLIC BLOOD PRESSURE - MUSE: NORMAL MMHG
T AXIS - MUSE: 63 DEGREES
T AXIS - MUSE: 69 DEGREES
T AXIS - MUSE: 75 DEGREES
T AXIS - MUSE: 91 DEGREES
TARGETS BLD QL SMEAR: ABNORMAL
TOXIC GRANULES BLD QL SMEAR: ABNORMAL
TROPONIN T SERPL HS-MCNC: 107 NG/L
TROPONIN T SERPL HS-MCNC: 59 NG/L
TROPONIN T SERPL HS-MCNC: 67 NG/L
TROPONIN T SERPL HS-MCNC: 90 NG/L
TSH SERPL DL<=0.005 MIU/L-ACNC: 3.89 UIU/ML (ref 0.3–4.2)
UROBILINOGEN UR STRIP-MCNC: NORMAL MG/DL
VARIANT LYMPHS BLD QL SMEAR: ABNORMAL
VENTRICULAR RATE- MUSE: 110 BPM
VENTRICULAR RATE- MUSE: 68 BPM
VENTRICULAR RATE- MUSE: 86 BPM
VENTRICULAR RATE- MUSE: 97 BPM
WBC # BLD AUTO: 10.2 10E3/UL (ref 4–11)
WBC # BLD AUTO: 10.7 10E3/UL (ref 4–11)
WBC # BLD AUTO: 12.7 10E3/UL (ref 4–11)
WBC # BLD AUTO: 14.6 10E3/UL (ref 4–11)
WBC # BLD AUTO: 17.1 10E3/UL (ref 4–11)
WBC # BLD AUTO: 21.3 10E3/UL (ref 4–11)
WBC # BLD AUTO: 4.8 10E3/UL (ref 4–11)
WBC # BLD AUTO: 5.3 10E3/UL (ref 4–11)
WBC # BLD AUTO: 5.5 10E3/UL (ref 4–11)
WBC # BLD AUTO: 6.2 10E3/UL (ref 4–11)
WBC # BLD AUTO: 6.6 10E3/UL (ref 4–11)
WBC # BLD AUTO: 6.7 10E3/UL (ref 4–11)
WBC # BLD AUTO: 7.1 10E3/UL (ref 4–11)
WBC # BLD AUTO: 7.9 10E3/UL (ref 4–11)
WBC # BLD AUTO: 8.1 10E3/UL (ref 4–11)
WBC # BLD AUTO: 9.3 10E3/UL (ref 4–11)
WBC # BLD AUTO: 9.7 10E3/UL (ref 4–11)
WBC # FLD AUTO: ABNORMAL 10*3/UL
WBC URINE: 0 /HPF
WBC URINE: 1 /HPF
WBC URINE: 1 /HPF
WBC URINE: 3 /HPF

## 2024-01-01 PROCEDURE — 250N000011 HC RX IP 250 OP 636: Mod: JZ | Performed by: INTERNAL MEDICINE

## 2024-01-01 PROCEDURE — 96366 THER/PROPH/DIAG IV INF ADDON: CPT

## 2024-01-01 PROCEDURE — 83735 ASSAY OF MAGNESIUM: CPT | Performed by: HOSPITALIST

## 2024-01-01 PROCEDURE — 80048 BASIC METABOLIC PNL TOTAL CA: CPT | Performed by: INTERNAL MEDICINE

## 2024-01-01 PROCEDURE — 250N000013 HC RX MED GY IP 250 OP 250 PS 637: Performed by: INTERNAL MEDICINE

## 2024-01-01 PROCEDURE — 250N000013 HC RX MED GY IP 250 OP 250 PS 637

## 2024-01-01 PROCEDURE — 84132 ASSAY OF SERUM POTASSIUM: CPT | Performed by: HOSPITALIST

## 2024-01-01 PROCEDURE — 250N000011 HC RX IP 250 OP 636: Mod: JZ | Performed by: EMERGENCY MEDICINE

## 2024-01-01 PROCEDURE — 99418 PROLNG IP/OBS E/M EA 15 MIN: CPT | Performed by: REGISTERED NURSE

## 2024-01-01 PROCEDURE — 97530 THERAPEUTIC ACTIVITIES: CPT | Mod: GP

## 2024-01-01 PROCEDURE — 85027 COMPLETE CBC AUTOMATED: CPT | Performed by: INTERNAL MEDICINE

## 2024-01-01 PROCEDURE — 99232 SBSQ HOSP IP/OBS MODERATE 35: CPT | Performed by: INTERNAL MEDICINE

## 2024-01-01 PROCEDURE — 250N000013 HC RX MED GY IP 250 OP 250 PS 637: Performed by: STUDENT IN AN ORGANIZED HEALTH CARE EDUCATION/TRAINING PROGRAM

## 2024-01-01 PROCEDURE — 94640 AIRWAY INHALATION TREATMENT: CPT

## 2024-01-01 PROCEDURE — 81001 URINALYSIS AUTO W/SCOPE: CPT | Performed by: STUDENT IN AN ORGANIZED HEALTH CARE EDUCATION/TRAINING PROGRAM

## 2024-01-01 PROCEDURE — 250N000011 HC RX IP 250 OP 636: Performed by: PHYSICIAN ASSISTANT

## 2024-01-01 PROCEDURE — 120N000001 HC R&B MED SURG/OB

## 2024-01-01 PROCEDURE — 93005 ELECTROCARDIOGRAM TRACING: CPT

## 2024-01-01 PROCEDURE — 250N000009 HC RX 250: Performed by: RADIOLOGY

## 2024-01-01 PROCEDURE — 250N000011 HC RX IP 250 OP 636: Performed by: INTERNAL MEDICINE

## 2024-01-01 PROCEDURE — 94640 AIRWAY INHALATION TREATMENT: CPT | Mod: 76

## 2024-01-01 PROCEDURE — 99285 EMERGENCY DEPT VISIT HI MDM: CPT | Mod: 25

## 2024-01-01 PROCEDURE — 86900 BLOOD TYPING SEROLOGIC ABO: CPT | Performed by: STUDENT IN AN ORGANIZED HEALTH CARE EDUCATION/TRAINING PROGRAM

## 2024-01-01 PROCEDURE — 82550 ASSAY OF CK (CPK): CPT | Performed by: EMERGENCY MEDICINE

## 2024-01-01 PROCEDURE — 70553 MRI BRAIN STEM W/O & W/DYE: CPT

## 2024-01-01 PROCEDURE — 73080 X-RAY EXAM OF ELBOW: CPT | Mod: LT

## 2024-01-01 PROCEDURE — 83605 ASSAY OF LACTIC ACID: CPT | Performed by: EMERGENCY MEDICINE

## 2024-01-01 PROCEDURE — 36415 COLL VENOUS BLD VENIPUNCTURE: CPT | Performed by: STUDENT IN AN ORGANIZED HEALTH CARE EDUCATION/TRAINING PROGRAM

## 2024-01-01 PROCEDURE — 92526 ORAL FUNCTION THERAPY: CPT | Mod: GN | Performed by: SPEECH-LANGUAGE PATHOLOGIST

## 2024-01-01 PROCEDURE — 99310 SBSQ NF CARE HIGH MDM 45: CPT | Performed by: NURSE PRACTITIONER

## 2024-01-01 PROCEDURE — 80048 BASIC METABOLIC PNL TOTAL CA: CPT | Performed by: HOSPITALIST

## 2024-01-01 PROCEDURE — 97535 SELF CARE MNGMENT TRAINING: CPT | Mod: GO

## 2024-01-01 PROCEDURE — 999N000040 HC STATISTIC CONSULT NO CHARGE VASC ACCESS

## 2024-01-01 PROCEDURE — 36415 COLL VENOUS BLD VENIPUNCTURE: CPT | Performed by: INTERNAL MEDICINE

## 2024-01-01 PROCEDURE — 85025 COMPLETE CBC W/AUTO DIFF WBC: CPT | Performed by: HOSPITALIST

## 2024-01-01 PROCEDURE — 99291 CRITICAL CARE FIRST HOUR: CPT

## 2024-01-01 PROCEDURE — 70450 CT HEAD/BRAIN W/O DYE: CPT

## 2024-01-01 PROCEDURE — 99309 SBSQ NF CARE MODERATE MDM 30: CPT | Performed by: NURSE PRACTITIONER

## 2024-01-01 PROCEDURE — 93321 DOPPLER ECHO F-UP/LMTD STD: CPT | Mod: 26 | Performed by: INTERNAL MEDICINE

## 2024-01-01 PROCEDURE — 87640 STAPH A DNA AMP PROBE: CPT | Performed by: EMERGENCY MEDICINE

## 2024-01-01 PROCEDURE — 999N000157 HC STATISTIC RCP TIME EA 10 MIN

## 2024-01-01 PROCEDURE — 36415 COLL VENOUS BLD VENIPUNCTURE: CPT | Performed by: PHYSICIAN ASSISTANT

## 2024-01-01 PROCEDURE — 92526 ORAL FUNCTION THERAPY: CPT | Mod: GN

## 2024-01-01 PROCEDURE — 99239 HOSP IP/OBS DSCHRG MGMT >30: CPT | Performed by: STUDENT IN AN ORGANIZED HEALTH CARE EDUCATION/TRAINING PROGRAM

## 2024-01-01 PROCEDURE — 99233 SBSQ HOSP IP/OBS HIGH 50: CPT | Performed by: INTERNAL MEDICINE

## 2024-01-01 PROCEDURE — 250N000013 HC RX MED GY IP 250 OP 250 PS 637: Performed by: HOSPITALIST

## 2024-01-01 PROCEDURE — 84484 ASSAY OF TROPONIN QUANT: CPT | Performed by: HOSPITALIST

## 2024-01-01 PROCEDURE — 84484 ASSAY OF TROPONIN QUANT: CPT | Performed by: EMERGENCY MEDICINE

## 2024-01-01 PROCEDURE — 96365 THER/PROPH/DIAG IV INF INIT: CPT | Mod: 59

## 2024-01-01 PROCEDURE — 250N000009 HC RX 250: Performed by: EMERGENCY MEDICINE

## 2024-01-01 PROCEDURE — 93308 TTE F-UP OR LMTD: CPT | Mod: 26 | Performed by: INTERNAL MEDICINE

## 2024-01-01 PROCEDURE — 99232 SBSQ HOSP IP/OBS MODERATE 35: CPT | Performed by: STUDENT IN AN ORGANIZED HEALTH CARE EDUCATION/TRAINING PROGRAM

## 2024-01-01 PROCEDURE — 85025 COMPLETE CBC W/AUTO DIFF WBC: CPT | Performed by: STUDENT IN AN ORGANIZED HEALTH CARE EDUCATION/TRAINING PROGRAM

## 2024-01-01 PROCEDURE — 97116 GAIT TRAINING THERAPY: CPT | Mod: GP

## 2024-01-01 PROCEDURE — 80053 COMPREHEN METABOLIC PANEL: CPT | Performed by: EMERGENCY MEDICINE

## 2024-01-01 PROCEDURE — 92610 EVALUATE SWALLOWING FUNCTION: CPT | Mod: GN

## 2024-01-01 PROCEDURE — 36415 COLL VENOUS BLD VENIPUNCTURE: CPT | Performed by: EMERGENCY MEDICINE

## 2024-01-01 PROCEDURE — 250N000009 HC RX 250: Performed by: HOSPITALIST

## 2024-01-01 PROCEDURE — 85004 AUTOMATED DIFF WBC COUNT: CPT | Performed by: INTERNAL MEDICINE

## 2024-01-01 PROCEDURE — G0463 HOSPITAL OUTPT CLINIC VISIT: HCPCS | Mod: 25

## 2024-01-01 PROCEDURE — 87493 C DIFF AMPLIFIED PROBE: CPT | Performed by: INTERNAL MEDICINE

## 2024-01-01 PROCEDURE — 82803 BLOOD GASES ANY COMBINATION: CPT

## 2024-01-01 PROCEDURE — G0378 HOSPITAL OBSERVATION PER HR: HCPCS

## 2024-01-01 PROCEDURE — 93325 DOPPLER ECHO COLOR FLOW MAPG: CPT | Mod: 26 | Performed by: INTERNAL MEDICINE

## 2024-01-01 PROCEDURE — 88112 CYTOPATH CELL ENHANCE TECH: CPT | Mod: 26 | Performed by: PATHOLOGY

## 2024-01-01 PROCEDURE — 88305 TISSUE EXAM BY PATHOLOGIST: CPT | Mod: 26 | Performed by: PATHOLOGY

## 2024-01-01 PROCEDURE — 96374 THER/PROPH/DIAG INJ IV PUSH: CPT

## 2024-01-01 PROCEDURE — 99232 SBSQ HOSP IP/OBS MODERATE 35: CPT | Performed by: REGISTERED NURSE

## 2024-01-01 PROCEDURE — 82374 ASSAY BLOOD CARBON DIOXIDE: CPT | Performed by: HOSPITALIST

## 2024-01-01 PROCEDURE — 99233 SBSQ HOSP IP/OBS HIGH 50: CPT | Performed by: REGISTERED NURSE

## 2024-01-01 PROCEDURE — 83605 ASSAY OF LACTIC ACID: CPT | Performed by: HOSPITALIST

## 2024-01-01 PROCEDURE — 85027 COMPLETE CBC AUTOMATED: CPT | Performed by: HOSPITALIST

## 2024-01-01 PROCEDURE — 85018 HEMOGLOBIN: CPT | Performed by: STUDENT IN AN ORGANIZED HEALTH CARE EDUCATION/TRAINING PROGRAM

## 2024-01-01 PROCEDURE — G0463 HOSPITAL OUTPT CLINIC VISIT: HCPCS

## 2024-01-01 PROCEDURE — 250N000011 HC RX IP 250 OP 636: Performed by: STUDENT IN AN ORGANIZED HEALTH CARE EDUCATION/TRAINING PROGRAM

## 2024-01-01 PROCEDURE — 72100 X-RAY EXAM L-S SPINE 2/3 VWS: CPT

## 2024-01-01 PROCEDURE — 250N000013 HC RX MED GY IP 250 OP 250 PS 637: Performed by: EMERGENCY MEDICINE

## 2024-01-01 PROCEDURE — 999N000128 HC STATISTIC PERIPHERAL IV START W/O US GUIDANCE

## 2024-01-01 PROCEDURE — 258N000003 HC RX IP 258 OP 636

## 2024-01-01 PROCEDURE — 0W993ZZ DRAINAGE OF RIGHT PLEURAL CAVITY, PERCUTANEOUS APPROACH: ICD-10-PCS | Performed by: RADIOLOGY

## 2024-01-01 PROCEDURE — 84100 ASSAY OF PHOSPHORUS: CPT | Performed by: EMERGENCY MEDICINE

## 2024-01-01 PROCEDURE — 93325 DOPPLER ECHO COLOR FLOW MAPG: CPT

## 2024-01-01 PROCEDURE — 258N000003 HC RX IP 258 OP 636: Performed by: EMERGENCY MEDICINE

## 2024-01-01 PROCEDURE — 99239 HOSP IP/OBS DSCHRG MGMT >30: CPT | Performed by: INTERNAL MEDICINE

## 2024-01-01 PROCEDURE — 99223 1ST HOSP IP/OBS HIGH 75: CPT | Performed by: INTERNAL MEDICINE

## 2024-01-01 PROCEDURE — 74230 X-RAY XM SWLNG FUNCJ C+: CPT

## 2024-01-01 PROCEDURE — 93306 TTE W/DOPPLER COMPLETE: CPT

## 2024-01-01 PROCEDURE — 81001 URINALYSIS AUTO W/SCOPE: CPT | Performed by: EMERGENCY MEDICINE

## 2024-01-01 PROCEDURE — 80048 BASIC METABOLIC PNL TOTAL CA: CPT | Performed by: STUDENT IN AN ORGANIZED HEALTH CARE EDUCATION/TRAINING PROGRAM

## 2024-01-01 PROCEDURE — 85027 COMPLETE CBC AUTOMATED: CPT | Performed by: STUDENT IN AN ORGANIZED HEALTH CARE EDUCATION/TRAINING PROGRAM

## 2024-01-01 PROCEDURE — 83605 ASSAY OF LACTIC ACID: CPT

## 2024-01-01 PROCEDURE — 258N000003 HC RX IP 258 OP 636: Performed by: STUDENT IN AN ORGANIZED HEALTH CARE EDUCATION/TRAINING PROGRAM

## 2024-01-01 PROCEDURE — 99207 PR CDG-CUT & PASTE-POTENTIAL IMPACT ON LEVEL: CPT | Performed by: INTERNAL MEDICINE

## 2024-01-01 PROCEDURE — 99223 1ST HOSP IP/OBS HIGH 75: CPT | Performed by: HOSPITALIST

## 2024-01-01 PROCEDURE — 92611 MOTION FLUOROSCOPY/SWALLOW: CPT | Mod: GN

## 2024-01-01 PROCEDURE — 36415 COLL VENOUS BLD VENIPUNCTURE: CPT | Performed by: HOSPITALIST

## 2024-01-01 PROCEDURE — 99207 PR NON-BILLABLE SERV PER CHARTING: CPT | Mod: 93 | Performed by: INTERNAL MEDICINE

## 2024-01-01 PROCEDURE — 250N000011 HC RX IP 250 OP 636: Performed by: EMERGENCY MEDICINE

## 2024-01-01 PROCEDURE — 99231 SBSQ HOSP IP/OBS SF/LOW 25: CPT | Performed by: INTERNAL MEDICINE

## 2024-01-01 PROCEDURE — 88305 TISSUE EXAM BY PATHOLOGIST: CPT | Mod: TC | Performed by: HOSPITALIST

## 2024-01-01 PROCEDURE — 71045 X-RAY EXAM CHEST 1 VIEW: CPT

## 2024-01-01 PROCEDURE — 258N000003 HC RX IP 258 OP 636: Performed by: INTERNAL MEDICINE

## 2024-01-01 PROCEDURE — 84132 ASSAY OF SERUM POTASSIUM: CPT | Performed by: STUDENT IN AN ORGANIZED HEALTH CARE EDUCATION/TRAINING PROGRAM

## 2024-01-01 PROCEDURE — 258N000003 HC RX IP 258 OP 636: Performed by: PHYSICIAN ASSISTANT

## 2024-01-01 PROCEDURE — 99207 PR APP CREDIT; MD BILLING SHARED VISIT: CPT | Performed by: STUDENT IN AN ORGANIZED HEALTH CARE EDUCATION/TRAINING PROGRAM

## 2024-01-01 PROCEDURE — 85610 PROTHROMBIN TIME: CPT | Performed by: EMERGENCY MEDICINE

## 2024-01-01 PROCEDURE — 250N000011 HC RX IP 250 OP 636: Mod: JZ | Performed by: PHYSICIAN ASSISTANT

## 2024-01-01 PROCEDURE — 83690 ASSAY OF LIPASE: CPT | Performed by: EMERGENCY MEDICINE

## 2024-01-01 PROCEDURE — 258N000003 HC RX IP 258 OP 636: Performed by: HOSPITALIST

## 2024-01-01 PROCEDURE — 82565 ASSAY OF CREATININE: CPT | Performed by: HOSPITALIST

## 2024-01-01 PROCEDURE — 85610 PROTHROMBIN TIME: CPT | Performed by: INTERNAL MEDICINE

## 2024-01-01 PROCEDURE — 255N000002 HC RX 255 OP 636: Performed by: HOSPITALIST

## 2024-01-01 PROCEDURE — 83615 LACTATE (LD) (LDH) ENZYME: CPT | Performed by: HOSPITALIST

## 2024-01-01 PROCEDURE — 85025 COMPLETE CBC W/AUTO DIFF WBC: CPT | Performed by: EMERGENCY MEDICINE

## 2024-01-01 PROCEDURE — 97161 PT EVAL LOW COMPLEX 20 MIN: CPT | Mod: GP

## 2024-01-01 PROCEDURE — 96367 TX/PROPH/DG ADDL SEQ IV INF: CPT

## 2024-01-01 PROCEDURE — 96360 HYDRATION IV INFUSION INIT: CPT

## 2024-01-01 PROCEDURE — 92526 ORAL FUNCTION THERAPY: CPT | Mod: GN | Performed by: REHABILITATION PRACTITIONER

## 2024-01-01 PROCEDURE — 84145 PROCALCITONIN (PCT): CPT | Performed by: EMERGENCY MEDICINE

## 2024-01-01 PROCEDURE — 83735 ASSAY OF MAGNESIUM: CPT | Performed by: EMERGENCY MEDICINE

## 2024-01-01 PROCEDURE — 84155 ASSAY OF PROTEIN SERUM: CPT | Performed by: HOSPITALIST

## 2024-01-01 PROCEDURE — 99232 SBSQ HOSP IP/OBS MODERATE 35: CPT | Performed by: HOSPITALIST

## 2024-01-01 PROCEDURE — 73130 X-RAY EXAM OF HAND: CPT | Mod: LT

## 2024-01-01 PROCEDURE — 99233 SBSQ HOSP IP/OBS HIGH 50: CPT | Performed by: HOSPITALIST

## 2024-01-01 PROCEDURE — 85018 HEMOGLOBIN: CPT | Performed by: INTERNAL MEDICINE

## 2024-01-01 PROCEDURE — 85018 HEMOGLOBIN: CPT | Performed by: PHYSICIAN ASSISTANT

## 2024-01-01 PROCEDURE — 83880 ASSAY OF NATRIURETIC PEPTIDE: CPT | Performed by: EMERGENCY MEDICINE

## 2024-01-01 PROCEDURE — 80053 COMPREHEN METABOLIC PANEL: CPT | Performed by: STUDENT IN AN ORGANIZED HEALTH CARE EDUCATION/TRAINING PROGRAM

## 2024-01-01 PROCEDURE — 99207 PR NO BILLABLE SERVICE THIS VISIT: CPT | Performed by: HOSPITALIST

## 2024-01-01 PROCEDURE — 71046 X-RAY EXAM CHEST 2 VIEWS: CPT

## 2024-01-01 PROCEDURE — 82945 GLUCOSE OTHER FLUID: CPT | Performed by: HOSPITALIST

## 2024-01-01 PROCEDURE — 97161 PT EVAL LOW COMPLEX 20 MIN: CPT | Mod: GP | Performed by: PHYSICAL THERAPIST

## 2024-01-01 PROCEDURE — 84157 ASSAY OF PROTEIN OTHER: CPT | Performed by: HOSPITALIST

## 2024-01-01 PROCEDURE — 97110 THERAPEUTIC EXERCISES: CPT | Mod: GP

## 2024-01-01 PROCEDURE — 97165 OT EVAL LOW COMPLEX 30 MIN: CPT | Mod: GO

## 2024-01-01 PROCEDURE — 87086 URINE CULTURE/COLONY COUNT: CPT | Performed by: PHYSICIAN ASSISTANT

## 2024-01-01 PROCEDURE — 85048 AUTOMATED LEUKOCYTE COUNT: CPT | Performed by: INTERNAL MEDICINE

## 2024-01-01 PROCEDURE — 82140 ASSAY OF AMMONIA: CPT | Performed by: PHYSICIAN ASSISTANT

## 2024-01-01 PROCEDURE — 87637 SARSCOV2&INF A&B&RSV AMP PRB: CPT | Performed by: EMERGENCY MEDICINE

## 2024-01-01 PROCEDURE — 250N000009 HC RX 250

## 2024-01-01 PROCEDURE — 83605 ASSAY OF LACTIC ACID: CPT | Performed by: INTERNAL MEDICINE

## 2024-01-01 PROCEDURE — 96361 HYDRATE IV INFUSION ADD-ON: CPT

## 2024-01-01 PROCEDURE — 87641 MR-STAPH DNA AMP PROBE: CPT | Performed by: EMERGENCY MEDICINE

## 2024-01-01 PROCEDURE — 250N000009 HC RX 250: Performed by: INTERNAL MEDICINE

## 2024-01-01 PROCEDURE — 99305 1ST NF CARE MODERATE MDM 35: CPT | Performed by: INTERNAL MEDICINE

## 2024-01-01 PROCEDURE — 250N000011 HC RX IP 250 OP 636: Performed by: HOSPITALIST

## 2024-01-01 PROCEDURE — 99233 SBSQ HOSP IP/OBS HIGH 50: CPT | Performed by: STUDENT IN AN ORGANIZED HEALTH CARE EDUCATION/TRAINING PROGRAM

## 2024-01-01 PROCEDURE — 80048 BASIC METABOLIC PNL TOTAL CA: CPT | Performed by: EMERGENCY MEDICINE

## 2024-01-01 PROCEDURE — 87040 BLOOD CULTURE FOR BACTERIA: CPT | Performed by: EMERGENCY MEDICINE

## 2024-01-01 PROCEDURE — A9585 GADOBUTROL INJECTION: HCPCS | Performed by: HOSPITALIST

## 2024-01-01 PROCEDURE — 71275 CT ANGIOGRAPHY CHEST: CPT

## 2024-01-01 PROCEDURE — 99239 HOSP IP/OBS DSCHRG MGMT >30: CPT | Performed by: HOSPITALIST

## 2024-01-01 PROCEDURE — 85027 COMPLETE CBC AUTOMATED: CPT

## 2024-01-01 PROCEDURE — 93306 TTE W/DOPPLER COMPLETE: CPT | Mod: 26 | Performed by: INTERNAL MEDICINE

## 2024-01-01 PROCEDURE — 82803 BLOOD GASES ANY COMBINATION: CPT | Performed by: PHYSICIAN ASSISTANT

## 2024-01-01 PROCEDURE — 99207 PR APP CREDIT; MD BILLING SHARED VISIT: CPT | Performed by: PHYSICIAN ASSISTANT

## 2024-01-01 PROCEDURE — 87205 SMEAR GRAM STAIN: CPT | Performed by: HOSPITALIST

## 2024-01-01 PROCEDURE — 272N000042 US THORACENTESIS

## 2024-01-01 PROCEDURE — 72170 X-RAY EXAM OF PELVIS: CPT

## 2024-01-01 PROCEDURE — 92610 EVALUATE SWALLOWING FUNCTION: CPT | Mod: GN | Performed by: SPEECH-LANGUAGE PATHOLOGIST

## 2024-01-01 PROCEDURE — 82010 KETONE BODYS QUAN: CPT | Performed by: EMERGENCY MEDICINE

## 2024-01-01 PROCEDURE — 84443 ASSAY THYROID STIM HORMONE: CPT | Performed by: EMERGENCY MEDICINE

## 2024-01-01 PROCEDURE — 99316 NF DSCHRG MGMT 30 MIN+: CPT | Performed by: NURSE PRACTITIONER

## 2024-01-01 PROCEDURE — 80307 DRUG TEST PRSMV CHEM ANLYZR: CPT | Performed by: EMERGENCY MEDICINE

## 2024-01-01 PROCEDURE — 97530 THERAPEUTIC ACTIVITIES: CPT | Mod: GP | Performed by: PHYSICAL THERAPIST

## 2024-01-01 PROCEDURE — 99222 1ST HOSP IP/OBS MODERATE 55: CPT | Performed by: INTERNAL MEDICINE

## 2024-01-01 PROCEDURE — 89050 BODY FLUID CELL COUNT: CPT | Performed by: HOSPITALIST

## 2024-01-01 PROCEDURE — 99222 1ST HOSP IP/OBS MODERATE 55: CPT

## 2024-01-01 PROCEDURE — 82805 BLOOD GASES W/O2 SATURATION: CPT

## 2024-01-01 PROCEDURE — 99222 1ST HOSP IP/OBS MODERATE 55: CPT | Performed by: REGISTERED NURSE

## 2024-01-01 PROCEDURE — 80048 BASIC METABOLIC PNL TOTAL CA: CPT

## 2024-01-01 PROCEDURE — 36415 COLL VENOUS BLD VENIPUNCTURE: CPT

## 2024-01-01 RX ORDER — DULOXETIN HYDROCHLORIDE 60 MG/1
60 CAPSULE, DELAYED RELEASE ORAL DAILY
Status: ON HOLD | COMMUNITY
End: 2024-01-01

## 2024-01-01 RX ORDER — LIDOCAINE HYDROCHLORIDE 10 MG/ML
10 INJECTION, SOLUTION EPIDURAL; INFILTRATION; INTRACAUDAL; PERINEURAL ONCE
Status: COMPLETED | OUTPATIENT
Start: 2024-01-01 | End: 2024-01-01

## 2024-01-01 RX ORDER — HYDROXYZINE HYDROCHLORIDE 25 MG/1
50 TABLET, FILM COATED ORAL EVERY 6 HOURS PRN
Qty: 360 TABLET | Refills: 0 | Status: SHIPPED | OUTPATIENT
Start: 2024-01-01 | End: 2024-01-01

## 2024-01-01 RX ORDER — TAMSULOSIN HYDROCHLORIDE 0.4 MG/1
0.4 CAPSULE ORAL DAILY
Status: DISCONTINUED | OUTPATIENT
Start: 2024-01-01 | End: 2024-01-01 | Stop reason: HOSPADM

## 2024-01-01 RX ORDER — GABAPENTIN 300 MG/1
600 CAPSULE ORAL AT BEDTIME
Status: DISCONTINUED | OUTPATIENT
Start: 2024-01-01 | End: 2024-01-01 | Stop reason: HOSPADM

## 2024-01-01 RX ORDER — FAMOTIDINE 40 MG/1
40 TABLET, FILM COATED ORAL 2 TIMES DAILY
Qty: 60 TABLET | Refills: 0 | Status: ON HOLD | OUTPATIENT
Start: 2024-01-01 | End: 2024-01-01

## 2024-01-01 RX ORDER — SODIUM CHLORIDE 9 MG/ML
INJECTION, SOLUTION INTRAVENOUS CONTINUOUS
Status: DISCONTINUED | OUTPATIENT
Start: 2024-01-01 | End: 2024-01-01

## 2024-01-01 RX ORDER — POTASSIUM CHLORIDE 1500 MG/1
20 TABLET, EXTENDED RELEASE ORAL DAILY
Status: ON HOLD | COMMUNITY
End: 2024-01-01

## 2024-01-01 RX ORDER — VANCOMYCIN HYDROCHLORIDE 50 MG/ML
125 KIT ORAL 4 TIMES DAILY
Status: DISCONTINUED | OUTPATIENT
Start: 2024-01-01 | End: 2024-01-01 | Stop reason: HOSPADM

## 2024-01-01 RX ORDER — ATORVASTATIN CALCIUM 10 MG/1
10 TABLET, FILM COATED ORAL DAILY
Status: DISCONTINUED | OUTPATIENT
Start: 2024-01-01 | End: 2024-01-01

## 2024-01-01 RX ORDER — NALOXONE HYDROCHLORIDE 0.4 MG/ML
0.2 INJECTION, SOLUTION INTRAMUSCULAR; INTRAVENOUS; SUBCUTANEOUS
Status: DISCONTINUED | OUTPATIENT
Start: 2024-01-01 | End: 2024-01-01 | Stop reason: HOSPADM

## 2024-01-01 RX ORDER — ONDANSETRON 4 MG/1
4 TABLET, ORALLY DISINTEGRATING ORAL EVERY 6 HOURS PRN
Status: DISCONTINUED | OUTPATIENT
Start: 2024-01-01 | End: 2024-01-01 | Stop reason: HOSPADM

## 2024-01-01 RX ORDER — ACETAMINOPHEN 650 MG/1
650 SUPPOSITORY RECTAL EVERY 4 HOURS PRN
Status: DISCONTINUED | OUTPATIENT
Start: 2024-01-01 | End: 2024-01-01 | Stop reason: HOSPADM

## 2024-01-01 RX ORDER — CALCIUM CARBONATE 500 MG/1
500 TABLET, CHEWABLE ORAL ONCE
Status: DISCONTINUED | OUTPATIENT
Start: 2024-01-01 | End: 2024-01-01

## 2024-01-01 RX ORDER — VANCOMYCIN HYDROCHLORIDE 50 MG/ML
125 KIT ORAL DAILY
Status: ON HOLD
Start: 2024-01-01 | End: 2024-01-01

## 2024-01-01 RX ORDER — SODIUM CHLORIDE, SODIUM LACTATE, POTASSIUM CHLORIDE, CALCIUM CHLORIDE 600; 310; 30; 20 MG/100ML; MG/100ML; MG/100ML; MG/100ML
INJECTION, SOLUTION INTRAVENOUS CONTINUOUS
Status: DISCONTINUED | OUTPATIENT
Start: 2024-01-01 | End: 2024-01-01

## 2024-01-01 RX ORDER — CALCIUM CARBONATE 500 MG/1
1000 TABLET, CHEWABLE ORAL 4 TIMES DAILY PRN
Status: DISCONTINUED | OUTPATIENT
Start: 2024-01-01 | End: 2024-01-01 | Stop reason: HOSPADM

## 2024-01-01 RX ORDER — FERROUS SULFATE 325(65) MG
325 TABLET, DELAYED RELEASE (ENTERIC COATED) ORAL DAILY
Status: ON HOLD
Start: 2024-01-01 | End: 2024-01-01

## 2024-01-01 RX ORDER — ACETAMINOPHEN 500 MG
1000 TABLET ORAL ONCE
Status: DISCONTINUED | OUTPATIENT
Start: 2024-01-01 | End: 2024-01-01

## 2024-01-01 RX ORDER — LIDOCAINE 40 MG/G
CREAM TOPICAL
Status: DISCONTINUED | OUTPATIENT
Start: 2024-01-01 | End: 2024-01-01 | Stop reason: HOSPADM

## 2024-01-01 RX ORDER — CEFEPIME HYDROCHLORIDE 2 G/1
2 INJECTION, POWDER, FOR SOLUTION INTRAVENOUS ONCE
Status: COMPLETED | OUTPATIENT
Start: 2024-01-01 | End: 2024-01-01

## 2024-01-01 RX ORDER — VANCOMYCIN HYDROCHLORIDE 50 MG/ML
125 KIT ORAL 2 TIMES DAILY
Qty: 150 ML | Refills: 0 | Status: ON HOLD | OUTPATIENT
Start: 2024-01-01 | End: 2024-01-01

## 2024-01-01 RX ORDER — POTASSIUM CHLORIDE 1.5 G/1.58G
20 POWDER, FOR SOLUTION ORAL ONCE
Status: DISCONTINUED | OUTPATIENT
Start: 2024-01-01 | End: 2024-01-01

## 2024-01-01 RX ORDER — VANCOMYCIN HYDROCHLORIDE 50 MG/ML
125 KIT ORAL 4 TIMES DAILY
DISCHARGE
Start: 2024-01-01 | End: 2024-01-01

## 2024-01-01 RX ORDER — POTASSIUM CHLORIDE 1.5 G/1.58G
40 POWDER, FOR SOLUTION ORAL ONCE
Status: DISCONTINUED | OUTPATIENT
Start: 2024-01-01 | End: 2024-01-01

## 2024-01-01 RX ORDER — DULOXETIN HYDROCHLORIDE 60 MG/1
60 CAPSULE, DELAYED RELEASE ORAL DAILY
Status: DISCONTINUED | OUTPATIENT
Start: 2024-01-01 | End: 2024-01-01 | Stop reason: HOSPADM

## 2024-01-01 RX ORDER — POTASSIUM CHLORIDE 1.5 G/1.58G
40 POWDER, FOR SOLUTION ORAL ONCE
Status: COMPLETED | OUTPATIENT
Start: 2024-01-01 | End: 2024-01-01

## 2024-01-01 RX ORDER — VANCOMYCIN HYDROCHLORIDE 50 MG/ML
125 KIT ORAL 2 TIMES DAILY
Status: DISCONTINUED | OUTPATIENT
Start: 2024-01-01 | End: 2024-01-01

## 2024-01-01 RX ORDER — NALOXONE HYDROCHLORIDE 0.4 MG/ML
0.4 INJECTION, SOLUTION INTRAMUSCULAR; INTRAVENOUS; SUBCUTANEOUS
Status: DISCONTINUED | OUTPATIENT
Start: 2024-01-01 | End: 2024-01-01 | Stop reason: HOSPADM

## 2024-01-01 RX ORDER — ACETAMINOPHEN 325 MG/1
650 TABLET ORAL EVERY 4 HOURS PRN
Status: DISCONTINUED | OUTPATIENT
Start: 2024-01-01 | End: 2024-01-01 | Stop reason: HOSPADM

## 2024-01-01 RX ORDER — SODIUM CHLORIDE, SODIUM LACTATE, POTASSIUM CHLORIDE, CALCIUM CHLORIDE 600; 310; 30; 20 MG/100ML; MG/100ML; MG/100ML; MG/100ML
INJECTION, SOLUTION INTRAVENOUS CONTINUOUS
Status: ACTIVE | OUTPATIENT
Start: 2024-01-01 | End: 2024-01-01

## 2024-01-01 RX ORDER — IPRATROPIUM BROMIDE AND ALBUTEROL SULFATE 2.5; .5 MG/3ML; MG/3ML
3 SOLUTION RESPIRATORY (INHALATION) 4 TIMES DAILY
Status: DISCONTINUED | OUTPATIENT
Start: 2024-01-01 | End: 2024-01-01

## 2024-01-01 RX ORDER — BARIUM SULFATE 400 MG/ML
SUSPENSION ORAL ONCE
Status: COMPLETED | OUTPATIENT
Start: 2024-01-01 | End: 2024-01-01

## 2024-01-01 RX ORDER — IPRATROPIUM BROMIDE AND ALBUTEROL SULFATE 2.5; .5 MG/3ML; MG/3ML
3 SOLUTION RESPIRATORY (INHALATION) EVERY 4 HOURS PRN
Status: DISCONTINUED | OUTPATIENT
Start: 2024-01-01 | End: 2024-01-01 | Stop reason: HOSPADM

## 2024-01-01 RX ORDER — POTASSIUM CHLORIDE 7.45 MG/ML
10 INJECTION INTRAVENOUS
Status: COMPLETED | OUTPATIENT
Start: 2024-01-01 | End: 2024-01-01

## 2024-01-01 RX ORDER — FAMOTIDINE 20 MG/1
20 TABLET, FILM COATED ORAL DAILY
Qty: 30 TABLET | Refills: 0 | Status: ON HOLD | OUTPATIENT
Start: 2024-01-01 | End: 2024-01-01

## 2024-01-01 RX ORDER — ALBUTEROL SULFATE 0.83 MG/ML
2.5 SOLUTION RESPIRATORY (INHALATION) EVERY 4 HOURS PRN
Status: SHIPPED
Start: 2024-01-01

## 2024-01-01 RX ORDER — VANCOMYCIN HYDROCHLORIDE 125 MG/1
125 CAPSULE ORAL 4 TIMES DAILY
Status: DISCONTINUED | OUTPATIENT
Start: 2024-01-01 | End: 2024-01-01

## 2024-01-01 RX ORDER — GABAPENTIN 300 MG/1
300 CAPSULE ORAL AT BEDTIME
Status: SHIPPED
Start: 2024-01-01

## 2024-01-01 RX ORDER — MORPHINE SULFATE 10 MG/5ML
1 SOLUTION ORAL EVERY 4 HOURS PRN
Qty: 10 ML | Refills: 0 | Status: SHIPPED | OUTPATIENT
Start: 2024-01-01 | End: 2024-01-01

## 2024-01-01 RX ORDER — DULOXETIN HYDROCHLORIDE 60 MG/1
60 CAPSULE, DELAYED RELEASE ORAL DAILY
Status: DISCONTINUED | OUTPATIENT
Start: 2024-01-01 | End: 2024-01-01

## 2024-01-01 RX ORDER — POTASSIUM CHLORIDE 1500 MG/1
40 TABLET, EXTENDED RELEASE ORAL ONCE
Status: COMPLETED | OUTPATIENT
Start: 2024-01-01 | End: 2024-01-01

## 2024-01-01 RX ORDER — POTASSIUM CHLORIDE 1500 MG/1
20 TABLET, EXTENDED RELEASE ORAL ONCE
Status: COMPLETED | OUTPATIENT
Start: 2024-01-01 | End: 2024-01-01

## 2024-01-01 RX ORDER — IOPAMIDOL 755 MG/ML
70 INJECTION, SOLUTION INTRAVASCULAR ONCE
Status: COMPLETED | OUTPATIENT
Start: 2024-01-01 | End: 2024-01-01

## 2024-01-01 RX ORDER — GADOBUTROL 604.72 MG/ML
7 INJECTION INTRAVENOUS ONCE
Status: COMPLETED | OUTPATIENT
Start: 2024-01-01 | End: 2024-01-01

## 2024-01-01 RX ORDER — OLANZAPINE 2.5 MG/1
2.5 TABLET, FILM COATED ORAL EVERY 12 HOURS PRN
Qty: 6 TABLET | Refills: 0 | Status: SHIPPED | OUTPATIENT
Start: 2024-01-01

## 2024-01-01 RX ORDER — ONDANSETRON 2 MG/ML
4 INJECTION INTRAMUSCULAR; INTRAVENOUS EVERY 6 HOURS PRN
Status: DISCONTINUED | OUTPATIENT
Start: 2024-01-01 | End: 2024-01-01 | Stop reason: HOSPADM

## 2024-01-01 RX ORDER — METRONIDAZOLE 500 MG/100ML
500 INJECTION, SOLUTION INTRAVENOUS EVERY 8 HOURS
Status: DISCONTINUED | OUTPATIENT
Start: 2024-01-01 | End: 2024-01-01

## 2024-01-01 RX ORDER — FLUDROCORTISONE ACETATE 0.1 MG/1
0.1 TABLET ORAL DAILY
Status: DISCONTINUED | OUTPATIENT
Start: 2024-01-01 | End: 2024-01-01 | Stop reason: HOSPADM

## 2024-01-01 RX ORDER — OFLOXACIN 3 MG/ML
10 SOLUTION AURICULAR (OTIC) 2 TIMES DAILY
Status: ON HOLD
Start: 2024-01-01 | End: 2024-01-01

## 2024-01-01 RX ORDER — ALBUTEROL SULFATE 90 UG/1
2 AEROSOL, METERED RESPIRATORY (INHALATION) EVERY 6 HOURS PRN
Status: DISCONTINUED | OUTPATIENT
Start: 2024-01-01 | End: 2024-01-01 | Stop reason: HOSPADM

## 2024-01-01 RX ORDER — ACETAMINOPHEN 650 MG/1
650 SUPPOSITORY RECTAL ONCE
Status: COMPLETED | OUTPATIENT
Start: 2024-01-01 | End: 2024-01-01

## 2024-01-01 RX ORDER — SODIUM CHLORIDE 9 MG/ML
INJECTION, SOLUTION INTRAVENOUS CONTINUOUS
Status: ACTIVE | OUTPATIENT
Start: 2024-01-01 | End: 2024-01-01

## 2024-01-01 RX ORDER — ONDANSETRON 4 MG/1
4 TABLET, ORALLY DISINTEGRATING ORAL EVERY 6 HOURS PRN
Qty: 10 TABLET | Refills: 0 | Status: SHIPPED | OUTPATIENT
Start: 2024-01-01

## 2024-01-01 RX ORDER — VANCOMYCIN HYDROCHLORIDE 50 MG/ML
125 KIT ORAL EVERY 6 HOURS
Status: DISCONTINUED | OUTPATIENT
Start: 2024-01-01 | End: 2024-01-01 | Stop reason: HOSPADM

## 2024-01-01 RX ORDER — OLANZAPINE 5 MG/1
5 TABLET, ORALLY DISINTEGRATING ORAL DAILY PRN
Status: DISCONTINUED | OUTPATIENT
Start: 2024-01-01 | End: 2024-01-01 | Stop reason: HOSPADM

## 2024-01-01 RX ORDER — ACETAMINOPHEN 325 MG/1
650 TABLET ORAL EVERY 6 HOURS PRN
Status: SHIPPED
Start: 2024-01-01 | End: 2024-01-01

## 2024-01-01 RX ORDER — FAMOTIDINE 20 MG/1
20 TABLET, FILM COATED ORAL DAILY
Status: DISCONTINUED | OUTPATIENT
Start: 2024-01-01 | End: 2024-01-01 | Stop reason: HOSPADM

## 2024-01-01 RX ORDER — AMOXICILLIN 250 MG
2 CAPSULE ORAL 2 TIMES DAILY PRN
Status: DISCONTINUED | OUTPATIENT
Start: 2024-01-01 | End: 2024-01-01 | Stop reason: HOSPADM

## 2024-01-01 RX ORDER — OFLOXACIN 3 MG/ML
10 SOLUTION AURICULAR (OTIC) 2 TIMES DAILY
Status: ON HOLD | COMMUNITY
End: 2024-01-01

## 2024-01-01 RX ORDER — HYDROMORPHONE HCL IN WATER/PF 6 MG/30 ML
0.4 PATIENT CONTROLLED ANALGESIA SYRINGE INTRAVENOUS
Status: DISCONTINUED | OUTPATIENT
Start: 2024-01-01 | End: 2024-01-01 | Stop reason: HOSPADM

## 2024-01-01 RX ORDER — FLUTICASONE FUROATE AND VILANTEROL 200; 25 UG/1; UG/1
1 POWDER RESPIRATORY (INHALATION) DAILY
Status: DISCONTINUED | OUTPATIENT
Start: 2024-01-01 | End: 2024-01-01 | Stop reason: HOSPADM

## 2024-01-01 RX ORDER — ALBUTEROL SULFATE 0.83 MG/ML
3 SOLUTION RESPIRATORY (INHALATION)
Status: DISCONTINUED | OUTPATIENT
Start: 2024-01-01 | End: 2024-01-01 | Stop reason: HOSPADM

## 2024-01-01 RX ORDER — AMOXICILLIN 250 MG
1 CAPSULE ORAL 2 TIMES DAILY PRN
Status: DISCONTINUED | OUTPATIENT
Start: 2024-01-01 | End: 2024-01-01 | Stop reason: HOSPADM

## 2024-01-01 RX ORDER — WATER 10 ML/10ML
INJECTION INTRAMUSCULAR; INTRAVENOUS; SUBCUTANEOUS
Status: COMPLETED
Start: 2024-01-01 | End: 2024-01-01

## 2024-01-01 RX ORDER — FAMOTIDINE 20 MG/1
40 TABLET, FILM COATED ORAL 2 TIMES DAILY
Status: DISCONTINUED | OUTPATIENT
Start: 2024-01-01 | End: 2024-01-01 | Stop reason: HOSPADM

## 2024-01-01 RX ORDER — VANCOMYCIN HYDROCHLORIDE 50 MG/ML
125 KIT ORAL DAILY
Status: DISCONTINUED | OUTPATIENT
Start: 2024-01-01 | End: 2024-01-01 | Stop reason: HOSPADM

## 2024-01-01 RX ORDER — DULOXETINE 40 MG/1
40 CAPSULE, DELAYED RELEASE ORAL DAILY
Qty: 30 CAPSULE | Refills: 0 | Status: ON HOLD | OUTPATIENT
Start: 2024-01-01 | End: 2024-01-01

## 2024-01-01 RX ORDER — ALBUTEROL SULFATE 0.83 MG/ML
2.5 SOLUTION RESPIRATORY (INHALATION) EVERY 6 HOURS PRN
Status: DISCONTINUED | OUTPATIENT
Start: 2024-01-01 | End: 2024-01-01 | Stop reason: HOSPADM

## 2024-01-01 RX ORDER — POTASSIUM CHLORIDE 1.5 G/1.58G
20 POWDER, FOR SOLUTION ORAL ONCE
Status: COMPLETED | OUTPATIENT
Start: 2024-01-01 | End: 2024-01-01

## 2024-01-01 RX ORDER — PROCHLORPERAZINE 25 MG
12.5 SUPPOSITORY, RECTAL RECTAL EVERY 12 HOURS PRN
Status: DISCONTINUED | OUTPATIENT
Start: 2024-01-01 | End: 2024-01-01 | Stop reason: HOSPADM

## 2024-01-01 RX ORDER — PROCHLORPERAZINE MALEATE 5 MG
5 TABLET ORAL EVERY 6 HOURS PRN
Status: DISCONTINUED | OUTPATIENT
Start: 2024-01-01 | End: 2024-01-01 | Stop reason: HOSPADM

## 2024-01-01 RX ORDER — OLANZAPINE 10 MG/2ML
2.5 INJECTION, POWDER, FOR SOLUTION INTRAMUSCULAR DAILY PRN
Status: DISCONTINUED | OUTPATIENT
Start: 2024-01-01 | End: 2024-01-01 | Stop reason: HOSPADM

## 2024-01-01 RX ORDER — VANCOMYCIN HYDROCHLORIDE 50 MG/ML
125 KIT ORAL DAILY
Status: SHIPPED
Start: 2024-01-01

## 2024-01-01 RX ORDER — METRONIDAZOLE 500 MG/100ML
500 INJECTION, SOLUTION INTRAVENOUS EVERY 12 HOURS
Status: DISCONTINUED | OUTPATIENT
Start: 2024-01-01 | End: 2024-01-01

## 2024-01-01 RX ORDER — VANCOMYCIN HYDROCHLORIDE 125 MG/1
125 CAPSULE ORAL 4 TIMES DAILY
Status: DISCONTINUED | OUTPATIENT
Start: 2024-01-01 | End: 2024-01-01 | Stop reason: HOSPADM

## 2024-01-01 RX ORDER — MULTIVITAMIN,THERAPEUTIC
1 TABLET ORAL DAILY
Status: DISCONTINUED | OUTPATIENT
Start: 2024-01-01 | End: 2024-01-01 | Stop reason: HOSPADM

## 2024-01-01 RX ORDER — CALCIUM CARBONATE 500 MG/1
1 TABLET, CHEWABLE ORAL 2 TIMES DAILY PRN
Status: ON HOLD | COMMUNITY
End: 2024-01-01

## 2024-01-01 RX ORDER — DULOXETIN HYDROCHLORIDE 20 MG/1
40 CAPSULE, DELAYED RELEASE ORAL DAILY
Status: DISCONTINUED | OUTPATIENT
Start: 2024-01-01 | End: 2024-01-01 | Stop reason: HOSPADM

## 2024-01-01 RX ORDER — ACETAMINOPHEN 325 MG/1
325-650 TABLET ORAL EVERY 6 HOURS PRN
Status: SHIPPED
Start: 2024-01-01

## 2024-01-01 RX ORDER — CEFAZOLIN SODIUM 1 G/50ML
750 SOLUTION INTRAVENOUS EVERY 24 HOURS
Status: DISCONTINUED | OUTPATIENT
Start: 2024-01-01 | End: 2024-01-01

## 2024-01-01 RX ORDER — GABAPENTIN 300 MG/1
300 CAPSULE ORAL AT BEDTIME
Status: DISCONTINUED | OUTPATIENT
Start: 2024-01-01 | End: 2024-01-01 | Stop reason: HOSPADM

## 2024-01-01 RX ORDER — ONDANSETRON 4 MG/1
4 TABLET, ORALLY DISINTEGRATING ORAL EVERY 6 HOURS PRN
Status: ON HOLD
Start: 2024-01-01 | End: 2024-01-01

## 2024-01-01 RX ORDER — GABAPENTIN 400 MG/1
400 CAPSULE ORAL AT BEDTIME
Qty: 30 CAPSULE | Refills: 0 | Status: ON HOLD | OUTPATIENT
Start: 2024-01-01 | End: 2024-01-01

## 2024-01-01 RX ORDER — HYDROMORPHONE HCL IN WATER/PF 6 MG/30 ML
0.2 PATIENT CONTROLLED ANALGESIA SYRINGE INTRAVENOUS
Status: DISCONTINUED | OUTPATIENT
Start: 2024-01-01 | End: 2024-01-01 | Stop reason: HOSPADM

## 2024-01-01 RX ORDER — FAMOTIDINE 20 MG/1
20 TABLET, FILM COATED ORAL DAILY
Status: SHIPPED
Start: 2024-01-01

## 2024-01-01 RX ORDER — FLUDROCORTISONE ACETATE 0.1 MG/1
0.1 TABLET ORAL DAILY
Status: SHIPPED
Start: 2024-01-01

## 2024-01-01 RX ORDER — CEFEPIME HYDROCHLORIDE 2 G/1
2 INJECTION, POWDER, FOR SOLUTION INTRAVENOUS EVERY 12 HOURS
Qty: 175 ML | Refills: 0 | Status: DISCONTINUED | OUTPATIENT
Start: 2024-01-01 | End: 2024-01-01

## 2024-01-01 RX ORDER — GABAPENTIN 300 MG/1
600 CAPSULE ORAL AT BEDTIME
Status: DISCONTINUED | OUTPATIENT
Start: 2024-01-01 | End: 2024-01-01

## 2024-01-01 RX ORDER — DULOXETINE 40 MG/1
40 CAPSULE, DELAYED RELEASE ORAL DAILY
Status: SHIPPED
Start: 2024-01-01

## 2024-01-01 RX ORDER — ALBUTEROL SULFATE 0.83 MG/ML
2.5 SOLUTION RESPIRATORY (INHALATION) 4 TIMES DAILY
Status: DISCONTINUED | OUTPATIENT
Start: 2024-01-01 | End: 2024-01-01 | Stop reason: HOSPADM

## 2024-01-01 RX ADMIN — ALBUTEROL SULFATE 2.5 MG: 2.5 SOLUTION RESPIRATORY (INHALATION) at 19:42

## 2024-01-01 RX ADMIN — VANCOMYCIN HYDROCHLORIDE 125 MG: KIT at 14:50

## 2024-01-01 RX ADMIN — VANCOMYCIN HYDROCHLORIDE 125 MG: KIT at 12:35

## 2024-01-01 RX ADMIN — SODIUM CHLORIDE 500 ML: 9 INJECTION, SOLUTION INTRAVENOUS at 18:18

## 2024-01-01 RX ADMIN — GABAPENTIN 300 MG: 300 CAPSULE ORAL at 21:30

## 2024-01-01 RX ADMIN — FAMOTIDINE 40 MG: 20 TABLET ORAL at 09:08

## 2024-01-01 RX ADMIN — UMECLIDINIUM 1 PUFF: 62.5 AEROSOL, POWDER ORAL at 09:44

## 2024-01-01 RX ADMIN — VANCOMYCIN HYDROCHLORIDE 125 MG: KIT at 20:19

## 2024-01-01 RX ADMIN — UMECLIDINIUM 1 PUFF: 62.5 AEROSOL, POWDER ORAL at 08:59

## 2024-01-01 RX ADMIN — METRONIDAZOLE 500 MG: 500 INJECTION, SOLUTION INTRAVENOUS at 15:54

## 2024-01-01 RX ADMIN — DULOXETINE HYDROCHLORIDE 60 MG: 60 CAPSULE, DELAYED RELEASE ORAL at 14:01

## 2024-01-01 RX ADMIN — POTASSIUM CHLORIDE 40 MEQ: 1.5 POWDER, FOR SOLUTION ORAL at 12:26

## 2024-01-01 RX ADMIN — ALBUTEROL SULFATE 2.5 MG: 2.5 SOLUTION RESPIRATORY (INHALATION) at 16:00

## 2024-01-01 RX ADMIN — VANCOMYCIN HYDROCHLORIDE 125 MG: KIT at 14:31

## 2024-01-01 RX ADMIN — VANCOMYCIN HYDROCHLORIDE 125 MG: KIT at 14:22

## 2024-01-01 RX ADMIN — ALBUTEROL SULFATE 2.5 MG: 2.5 SOLUTION RESPIRATORY (INHALATION) at 19:31

## 2024-01-01 RX ADMIN — VANCOMYCIN HYDROCHLORIDE 125 MG: 125 CAPSULE ORAL at 10:54

## 2024-01-01 RX ADMIN — VANCOMYCIN HYDROCHLORIDE 125 MG: KIT at 11:58

## 2024-01-01 RX ADMIN — ALBUTEROL SULFATE 2.5 MG: 2.5 SOLUTION RESPIRATORY (INHALATION) at 16:58

## 2024-01-01 RX ADMIN — VANCOMYCIN HYDROCHLORIDE 125 MG: KIT at 02:37

## 2024-01-01 RX ADMIN — SODIUM CHLORIDE: 9 INJECTION, SOLUTION INTRAVENOUS at 00:17

## 2024-01-01 RX ADMIN — FLUDROCORTISONE ACETATE 0.1 MG: 0.1 TABLET ORAL at 08:40

## 2024-01-01 RX ADMIN — FAMOTIDINE 20 MG: 20 TABLET ORAL at 08:45

## 2024-01-01 RX ADMIN — POTASSIUM CHLORIDE 40 MEQ: 1500 TABLET, EXTENDED RELEASE ORAL at 10:49

## 2024-01-01 RX ADMIN — FAMOTIDINE 20 MG: 20 TABLET ORAL at 08:02

## 2024-01-01 RX ADMIN — VANCOMYCIN HYDROCHLORIDE 125 MG: KIT at 09:43

## 2024-01-01 RX ADMIN — FAMOTIDINE 20 MG: 10 INJECTION INTRAVENOUS at 00:53

## 2024-01-01 RX ADMIN — SODIUM CHLORIDE, POTASSIUM CHLORIDE, SODIUM LACTATE AND CALCIUM CHLORIDE: 600; 310; 30; 20 INJECTION, SOLUTION INTRAVENOUS at 13:15

## 2024-01-01 RX ADMIN — DULOXETINE HYDROCHLORIDE 40 MG: 20 CAPSULE, DELAYED RELEASE ORAL at 08:45

## 2024-01-01 RX ADMIN — VANCOMYCIN HYDROCHLORIDE 125 MG: KIT at 08:55

## 2024-01-01 RX ADMIN — FAMOTIDINE 20 MG: 20 TABLET ORAL at 08:39

## 2024-01-01 RX ADMIN — DULOXETINE HYDROCHLORIDE 40 MG: 20 CAPSULE, DELAYED RELEASE ORAL at 08:59

## 2024-01-01 RX ADMIN — FAMOTIDINE 20 MG: 20 TABLET ORAL at 09:00

## 2024-01-01 RX ADMIN — ALBUTEROL SULFATE 2.5 MG: 2.5 SOLUTION RESPIRATORY (INHALATION) at 12:02

## 2024-01-01 RX ADMIN — FLUDROCORTISONE ACETATE 0.1 MG: 0.1 TABLET ORAL at 09:34

## 2024-01-01 RX ADMIN — VANCOMYCIN HYDROCHLORIDE 125 MG: KIT at 09:21

## 2024-01-01 RX ADMIN — VANCOMYCIN HYDROCHLORIDE 125 MG: KIT at 08:50

## 2024-01-01 RX ADMIN — FAMOTIDINE 20 MG: 20 TABLET ORAL at 08:54

## 2024-01-01 RX ADMIN — VANCOMYCIN HYDROCHLORIDE 125 MG: KIT at 14:47

## 2024-01-01 RX ADMIN — VANCOMYCIN HYDROCHLORIDE 125 MG: KIT at 20:51

## 2024-01-01 RX ADMIN — VANCOMYCIN HYDROCHLORIDE 125 MG: 125 CAPSULE ORAL at 17:40

## 2024-01-01 RX ADMIN — DULOXETINE HYDROCHLORIDE 40 MG: 20 CAPSULE, DELAYED RELEASE ORAL at 14:35

## 2024-01-01 RX ADMIN — UMECLIDINIUM 1 PUFF: 62.5 AEROSOL, POWDER ORAL at 08:38

## 2024-01-01 RX ADMIN — POTASSIUM CHLORIDE 10 MEQ: 7.46 INJECTION, SOLUTION INTRAVENOUS at 07:45

## 2024-01-01 RX ADMIN — ALBUTEROL SULFATE 2.5 MG: 2.5 SOLUTION RESPIRATORY (INHALATION) at 14:33

## 2024-01-01 RX ADMIN — METRONIDAZOLE 500 MG: 500 INJECTION, SOLUTION INTRAVENOUS at 13:15

## 2024-01-01 RX ADMIN — FLUDROCORTISONE ACETATE 0.1 MG: 0.1 TABLET ORAL at 08:22

## 2024-01-01 RX ADMIN — TAMSULOSIN HYDROCHLORIDE 0.4 MG: 0.4 CAPSULE ORAL at 08:39

## 2024-01-01 RX ADMIN — VANCOMYCIN HYDROCHLORIDE 125 MG: KIT at 09:27

## 2024-01-01 RX ADMIN — FLUDROCORTISONE ACETATE 0.1 MG: 0.1 TABLET ORAL at 09:26

## 2024-01-01 RX ADMIN — ALBUTEROL SULFATE 2.5 MG: 2.5 SOLUTION RESPIRATORY (INHALATION) at 14:06

## 2024-01-01 RX ADMIN — FLUDROCORTISONE ACETATE 0.1 MG: 0.1 TABLET ORAL at 09:06

## 2024-01-01 RX ADMIN — ALBUTEROL SULFATE 2.5 MG: 2.5 SOLUTION RESPIRATORY (INHALATION) at 18:02

## 2024-01-01 RX ADMIN — GABAPENTIN 300 MG: 300 CAPSULE ORAL at 20:17

## 2024-01-01 RX ADMIN — TAMSULOSIN HYDROCHLORIDE 0.4 MG: 0.4 CAPSULE ORAL at 08:09

## 2024-01-01 RX ADMIN — LIDOCAINE HYDROCHLORIDE 10 ML: 10 INJECTION, SOLUTION EPIDURAL; INFILTRATION; INTRACAUDAL; PERINEURAL at 13:27

## 2024-01-01 RX ADMIN — OLANZAPINE 2.5 MG: 10 INJECTION, POWDER, FOR SOLUTION INTRAMUSCULAR at 09:11

## 2024-01-01 RX ADMIN — VANCOMYCIN HYDROCHLORIDE 125 MG: KIT at 17:07

## 2024-01-01 RX ADMIN — VANCOMYCIN HYDROCHLORIDE 1500 MG: 10 INJECTION, POWDER, LYOPHILIZED, FOR SOLUTION INTRAVENOUS at 22:36

## 2024-01-01 RX ADMIN — POTASSIUM CHLORIDE 40 MEQ: 1.5 POWDER, FOR SOLUTION ORAL at 14:41

## 2024-01-01 RX ADMIN — ATORVASTATIN CALCIUM 10 MG: 10 TABLET, FILM COATED ORAL at 08:39

## 2024-01-01 RX ADMIN — VANCOMYCIN HYDROCHLORIDE 125 MG: KIT at 20:16

## 2024-01-01 RX ADMIN — FLUDROCORTISONE ACETATE 0.1 MG: 0.1 TABLET ORAL at 09:47

## 2024-01-01 RX ADMIN — VANCOMYCIN HYDROCHLORIDE 125 MG: KIT at 09:02

## 2024-01-01 RX ADMIN — DULOXETINE HYDROCHLORIDE 40 MG: 20 CAPSULE, DELAYED RELEASE ORAL at 08:40

## 2024-01-01 RX ADMIN — VANCOMYCIN HYDROCHLORIDE 125 MG: KIT at 07:59

## 2024-01-01 RX ADMIN — BARIUM SULFATE 30 ML: 400 SUSPENSION ORAL at 11:31

## 2024-01-01 RX ADMIN — POTASSIUM CHLORIDE 20 MEQ: 1500 TABLET, EXTENDED RELEASE ORAL at 13:29

## 2024-01-01 RX ADMIN — DULOXETINE HYDROCHLORIDE 40 MG: 20 CAPSULE, DELAYED RELEASE ORAL at 08:49

## 2024-01-01 RX ADMIN — GABAPENTIN 300 MG: 300 CAPSULE ORAL at 21:58

## 2024-01-01 RX ADMIN — DULOXETINE HYDROCHLORIDE 40 MG: 20 CAPSULE, DELAYED RELEASE ORAL at 09:07

## 2024-01-01 RX ADMIN — VANCOMYCIN HYDROCHLORIDE 125 MG: KIT at 08:39

## 2024-01-01 RX ADMIN — TAMSULOSIN HYDROCHLORIDE 0.4 MG: 0.4 CAPSULE ORAL at 08:59

## 2024-01-01 RX ADMIN — VANCOMYCIN HYDROCHLORIDE 125 MG: KIT at 13:24

## 2024-01-01 RX ADMIN — TAMSULOSIN HYDROCHLORIDE 0.4 MG: 0.4 CAPSULE ORAL at 08:40

## 2024-01-01 RX ADMIN — VANCOMYCIN HYDROCHLORIDE 125 MG: KIT at 02:58

## 2024-01-01 RX ADMIN — METRONIDAZOLE 500 MG: 500 INJECTION, SOLUTION INTRAVENOUS at 12:00

## 2024-01-01 RX ADMIN — IPRATROPIUM BROMIDE AND ALBUTEROL SULFATE 3 ML: .5; 3 SOLUTION RESPIRATORY (INHALATION) at 06:08

## 2024-01-01 RX ADMIN — DULOXETINE HYDROCHLORIDE 40 MG: 20 CAPSULE, DELAYED RELEASE ORAL at 08:38

## 2024-01-01 RX ADMIN — METRONIDAZOLE 500 MG: 500 INJECTION, SOLUTION INTRAVENOUS at 21:11

## 2024-01-01 RX ADMIN — UMECLIDINIUM 1 PUFF: 62.5 AEROSOL, POWDER ORAL at 08:57

## 2024-01-01 RX ADMIN — FLUDROCORTISONE ACETATE 0.1 MG: 0.1 TABLET ORAL at 08:25

## 2024-01-01 RX ADMIN — ALBUTEROL SULFATE 2.5 MG: 2.5 SOLUTION RESPIRATORY (INHALATION) at 10:36

## 2024-01-01 RX ADMIN — VANCOMYCIN HYDROCHLORIDE 125 MG: KIT at 02:22

## 2024-01-01 RX ADMIN — DULOXETINE HYDROCHLORIDE 40 MG: 20 CAPSULE, DELAYED RELEASE ORAL at 09:26

## 2024-01-01 RX ADMIN — ALBUTEROL SULFATE 2.5 MG: 2.5 SOLUTION RESPIRATORY (INHALATION) at 11:42

## 2024-01-01 RX ADMIN — VANCOMYCIN HYDROCHLORIDE 125 MG: KIT at 20:59

## 2024-01-01 RX ADMIN — FAMOTIDINE 20 MG: 10 INJECTION INTRAVENOUS at 22:33

## 2024-01-01 RX ADMIN — VANCOMYCIN HYDROCHLORIDE 125 MG: KIT at 09:03

## 2024-01-01 RX ADMIN — VANCOMYCIN HYDROCHLORIDE 125 MG: KIT at 19:42

## 2024-01-01 RX ADMIN — POTASSIUM CHLORIDE 10 MEQ: 7.46 INJECTION, SOLUTION INTRAVENOUS at 04:01

## 2024-01-01 RX ADMIN — VANCOMYCIN HYDROCHLORIDE 125 MG: KIT at 21:53

## 2024-01-01 RX ADMIN — TAMSULOSIN HYDROCHLORIDE 0.4 MG: 0.4 CAPSULE ORAL at 13:11

## 2024-01-01 RX ADMIN — ATORVASTATIN CALCIUM 10 MG: 10 TABLET, FILM COATED ORAL at 08:49

## 2024-01-01 RX ADMIN — VANCOMYCIN HYDROCHLORIDE 125 MG: KIT at 20:46

## 2024-01-01 RX ADMIN — FAMOTIDINE 20 MG: 20 TABLET ORAL at 09:02

## 2024-01-01 RX ADMIN — IPRATROPIUM BROMIDE AND ALBUTEROL SULFATE 3 ML: .5; 3 SOLUTION RESPIRATORY (INHALATION) at 11:07

## 2024-01-01 RX ADMIN — TAMSULOSIN HYDROCHLORIDE 0.4 MG: 0.4 CAPSULE ORAL at 09:08

## 2024-01-01 RX ADMIN — DULOXETINE HYDROCHLORIDE 40 MG: 20 CAPSULE, DELAYED RELEASE ORAL at 09:34

## 2024-01-01 RX ADMIN — VANCOMYCIN HYDROCHLORIDE 125 MG: 125 CAPSULE ORAL at 08:22

## 2024-01-01 RX ADMIN — VANCOMYCIN HYDROCHLORIDE 125 MG: KIT at 08:02

## 2024-01-01 RX ADMIN — VANCOMYCIN HYDROCHLORIDE 125 MG: KIT at 17:02

## 2024-01-01 RX ADMIN — VANCOMYCIN HYDROCHLORIDE 125 MG: KIT at 14:16

## 2024-01-01 RX ADMIN — MULTIVITAMIN TABLET 1 TABLET: TABLET at 14:46

## 2024-01-01 RX ADMIN — DULOXETINE HYDROCHLORIDE 40 MG: 20 CAPSULE, DELAYED RELEASE ORAL at 09:00

## 2024-01-01 RX ADMIN — METRONIDAZOLE 500 MG: 500 INJECTION, SOLUTION INTRAVENOUS at 12:36

## 2024-01-01 RX ADMIN — VANCOMYCIN HYDROCHLORIDE 125 MG: KIT at 13:15

## 2024-01-01 RX ADMIN — ACETAMINOPHEN 650 MG: 325 TABLET, FILM COATED ORAL at 08:09

## 2024-01-01 RX ADMIN — UMECLIDINIUM 1 PUFF: 62.5 AEROSOL, POWDER ORAL at 13:54

## 2024-01-01 RX ADMIN — DULOXETINE HYDROCHLORIDE 60 MG: 60 CAPSULE, DELAYED RELEASE ORAL at 08:29

## 2024-01-01 RX ADMIN — SODIUM CHLORIDE: 9 INJECTION, SOLUTION INTRAVENOUS at 22:38

## 2024-01-01 RX ADMIN — POTASSIUM CHLORIDE 40 MEQ: 1.5 POWDER, FOR SOLUTION ORAL at 00:18

## 2024-01-01 RX ADMIN — IOPAMIDOL 70 ML: 755 INJECTION, SOLUTION INTRAVENOUS at 22:23

## 2024-01-01 RX ADMIN — FAMOTIDINE 20 MG: 10 INJECTION INTRAVENOUS at 20:00

## 2024-01-01 RX ADMIN — OLANZAPINE 5 MG: 5 TABLET, ORALLY DISINTEGRATING ORAL at 16:00

## 2024-01-01 RX ADMIN — VANCOMYCIN HYDROCHLORIDE 125 MG: KIT at 01:04

## 2024-01-01 RX ADMIN — CEFEPIME 2 G: 2 INJECTION, POWDER, FOR SOLUTION INTRAVENOUS at 16:21

## 2024-01-01 RX ADMIN — FLUDROCORTISONE ACETATE 0.1 MG: 0.1 TABLET ORAL at 08:38

## 2024-01-01 RX ADMIN — GABAPENTIN 300 MG: 300 CAPSULE ORAL at 21:53

## 2024-01-01 RX ADMIN — VANCOMYCIN HYDROCHLORIDE 125 MG: KIT at 09:07

## 2024-01-01 RX ADMIN — VANCOMYCIN HYDROCHLORIDE 125 MG: KIT at 20:14

## 2024-01-01 RX ADMIN — UMECLIDINIUM 1 PUFF: 62.5 AEROSOL, POWDER ORAL at 11:06

## 2024-01-01 RX ADMIN — ATORVASTATIN CALCIUM 10 MG: 10 TABLET, FILM COATED ORAL at 08:09

## 2024-01-01 RX ADMIN — UMECLIDINIUM 1 PUFF: 62.5 AEROSOL, POWDER ORAL at 09:07

## 2024-01-01 RX ADMIN — FAMOTIDINE 20 MG: 20 TABLET ORAL at 09:08

## 2024-01-01 RX ADMIN — FLUDROCORTISONE ACETATE 0.1 MG: 0.1 TABLET ORAL at 08:01

## 2024-01-01 RX ADMIN — SODIUM CHLORIDE, POTASSIUM CHLORIDE, SODIUM LACTATE AND CALCIUM CHLORIDE: 600; 310; 30; 20 INJECTION, SOLUTION INTRAVENOUS at 21:57

## 2024-01-01 RX ADMIN — UMECLIDINIUM 1 PUFF: 62.5 AEROSOL, POWDER ORAL at 09:47

## 2024-01-01 RX ADMIN — VANCOMYCIN HYDROCHLORIDE 125 MG: KIT at 12:36

## 2024-01-01 RX ADMIN — VANCOMYCIN HYDROCHLORIDE 125 MG: KIT at 12:28

## 2024-01-01 RX ADMIN — GABAPENTIN 300 MG: 300 CAPSULE ORAL at 20:51

## 2024-01-01 RX ADMIN — FAMOTIDINE 20 MG: 20 TABLET ORAL at 08:09

## 2024-01-01 RX ADMIN — ALBUTEROL SULFATE 2.5 MG: 2.5 SOLUTION RESPIRATORY (INHALATION) at 07:32

## 2024-01-01 RX ADMIN — METRONIDAZOLE 500 MG: 500 INJECTION, SOLUTION INTRAVENOUS at 20:34

## 2024-01-01 RX ADMIN — SODIUM CHLORIDE, POTASSIUM CHLORIDE, SODIUM LACTATE AND CALCIUM CHLORIDE: 600; 310; 30; 20 INJECTION, SOLUTION INTRAVENOUS at 11:25

## 2024-01-01 RX ADMIN — DULOXETINE HYDROCHLORIDE 40 MG: 20 CAPSULE, DELAYED RELEASE ORAL at 08:02

## 2024-01-01 RX ADMIN — SODIUM CHLORIDE: 9 INJECTION, SOLUTION INTRAVENOUS at 17:40

## 2024-01-01 RX ADMIN — VANCOMYCIN HYDROCHLORIDE 125 MG: KIT at 22:01

## 2024-01-01 RX ADMIN — VANCOMYCIN HYDROCHLORIDE 125 MG: KIT at 09:47

## 2024-01-01 RX ADMIN — FLUDROCORTISONE ACETATE 0.1 MG: 0.1 TABLET ORAL at 08:49

## 2024-01-01 RX ADMIN — FLUDROCORTISONE ACETATE 0.1 MG: 0.1 TABLET ORAL at 08:09

## 2024-01-01 RX ADMIN — POTASSIUM CHLORIDE 10 MEQ: 7.46 INJECTION, SOLUTION INTRAVENOUS at 06:09

## 2024-01-01 RX ADMIN — FAMOTIDINE 20 MG: 20 TABLET ORAL at 09:34

## 2024-01-01 RX ADMIN — FAMOTIDINE 20 MG: 10 INJECTION INTRAVENOUS at 12:36

## 2024-01-01 RX ADMIN — ATORVASTATIN CALCIUM 10 MG: 10 TABLET, FILM COATED ORAL at 14:01

## 2024-01-01 RX ADMIN — GABAPENTIN 400 MG: 100 CAPSULE ORAL at 20:51

## 2024-01-01 RX ADMIN — TAMSULOSIN HYDROCHLORIDE 0.4 MG: 0.4 CAPSULE ORAL at 14:00

## 2024-01-01 RX ADMIN — UMECLIDINIUM 1 PUFF: 62.5 AEROSOL, POWDER ORAL at 08:01

## 2024-01-01 RX ADMIN — CEFEPIME 2 G: 2 INJECTION, POWDER, FOR SOLUTION INTRAVENOUS at 16:17

## 2024-01-01 RX ADMIN — ALBUTEROL SULFATE 2.5 MG: 2.5 SOLUTION RESPIRATORY (INHALATION) at 12:32

## 2024-01-01 RX ADMIN — METRONIDAZOLE 500 MG: 500 INJECTION, SOLUTION INTRAVENOUS at 12:28

## 2024-01-01 RX ADMIN — MULTIVITAMIN TABLET 1 TABLET: TABLET at 08:22

## 2024-01-01 RX ADMIN — METRONIDAZOLE 500 MG: 500 INJECTION, SOLUTION INTRAVENOUS at 04:52

## 2024-01-01 RX ADMIN — ALBUTEROL SULFATE 2.5 MG: 2.5 SOLUTION RESPIRATORY (INHALATION) at 10:44

## 2024-01-01 RX ADMIN — TAMSULOSIN HYDROCHLORIDE 0.4 MG: 0.4 CAPSULE ORAL at 09:34

## 2024-01-01 RX ADMIN — GABAPENTIN 300 MG: 300 CAPSULE ORAL at 22:14

## 2024-01-01 RX ADMIN — UMECLIDINIUM 1 PUFF: 62.5 AEROSOL, POWDER ORAL at 09:26

## 2024-01-01 RX ADMIN — FAMOTIDINE 20 MG: 10 INJECTION INTRAVENOUS at 12:35

## 2024-01-01 RX ADMIN — VANCOMYCIN HYDROCHLORIDE 125 MG: KIT at 19:32

## 2024-01-01 RX ADMIN — TAMSULOSIN HYDROCHLORIDE 0.4 MG: 0.4 CAPSULE ORAL at 08:45

## 2024-01-01 RX ADMIN — METRONIDAZOLE 500 MG: 500 INJECTION, SOLUTION INTRAVENOUS at 17:29

## 2024-01-01 RX ADMIN — VANCOMYCIN HYDROCHLORIDE 125 MG: KIT at 13:54

## 2024-01-01 RX ADMIN — TAMSULOSIN HYDROCHLORIDE 0.4 MG: 0.4 CAPSULE ORAL at 08:29

## 2024-01-01 RX ADMIN — TAMSULOSIN HYDROCHLORIDE 0.4 MG: 0.4 CAPSULE ORAL at 08:01

## 2024-01-01 RX ADMIN — FLUDROCORTISONE ACETATE 0.1 MG: 0.1 TABLET ORAL at 07:59

## 2024-01-01 RX ADMIN — ALBUTEROL SULFATE 2.5 MG: 2.5 SOLUTION RESPIRATORY (INHALATION) at 19:09

## 2024-01-01 RX ADMIN — FLUDROCORTISONE ACETATE 0.1 MG: 0.1 TABLET ORAL at 08:29

## 2024-01-01 RX ADMIN — GABAPENTIN 600 MG: 300 CAPSULE ORAL at 22:22

## 2024-01-01 RX ADMIN — VANCOMYCIN HYDROCHLORIDE 125 MG: KIT at 20:52

## 2024-01-01 RX ADMIN — TAMSULOSIN HYDROCHLORIDE 0.4 MG: 0.4 CAPSULE ORAL at 09:26

## 2024-01-01 RX ADMIN — TAMSULOSIN HYDROCHLORIDE 0.4 MG: 0.4 CAPSULE ORAL at 09:45

## 2024-01-01 RX ADMIN — METRONIDAZOLE 500 MG: 500 INJECTION, SOLUTION INTRAVENOUS at 17:21

## 2024-01-01 RX ADMIN — IPRATROPIUM BROMIDE AND ALBUTEROL SULFATE 3 ML: .5; 3 SOLUTION RESPIRATORY (INHALATION) at 16:01

## 2024-01-01 RX ADMIN — VANCOMYCIN HYDROCHLORIDE 125 MG: 125 CAPSULE ORAL at 13:10

## 2024-01-01 RX ADMIN — METRONIDAZOLE 500 MG: 500 INJECTION, SOLUTION INTRAVENOUS at 05:47

## 2024-01-01 RX ADMIN — VANCOMYCIN HYDROCHLORIDE 125 MG: KIT at 22:03

## 2024-01-01 RX ADMIN — VANCOMYCIN HYDROCHLORIDE 125 MG: KIT at 02:10

## 2024-01-01 RX ADMIN — CEFEPIME 2 G: 2 INJECTION, POWDER, FOR SOLUTION INTRAVENOUS at 08:20

## 2024-01-01 RX ADMIN — VANCOMYCIN HYDROCHLORIDE 125 MG: KIT at 08:30

## 2024-01-01 RX ADMIN — VANCOMYCIN HYDROCHLORIDE 125 MG: KIT at 09:37

## 2024-01-01 RX ADMIN — FLUDROCORTISONE ACETATE 0.1 MG: 0.1 TABLET ORAL at 09:08

## 2024-01-01 RX ADMIN — VANCOMYCIN HYDROCHLORIDE 125 MG: KIT at 08:38

## 2024-01-01 RX ADMIN — WATER: 1 INJECTION INTRAMUSCULAR; INTRAVENOUS; SUBCUTANEOUS at 13:34

## 2024-01-01 RX ADMIN — UMECLIDINIUM 1 PUFF: 62.5 AEROSOL, POWDER ORAL at 08:50

## 2024-01-01 RX ADMIN — SODIUM CHLORIDE 1000 ML: 9 INJECTION, SOLUTION INTRAVENOUS at 13:02

## 2024-01-01 RX ADMIN — CEFEPIME 2 G: 2 INJECTION, POWDER, FOR SOLUTION INTRAVENOUS at 03:31

## 2024-01-01 RX ADMIN — DULOXETINE HYDROCHLORIDE 60 MG: 60 CAPSULE, DELAYED RELEASE ORAL at 07:59

## 2024-01-01 RX ADMIN — VANCOMYCIN HYDROCHLORIDE 125 MG: 125 CAPSULE ORAL at 08:25

## 2024-01-01 RX ADMIN — SODIUM CHLORIDE, POTASSIUM CHLORIDE, SODIUM LACTATE AND CALCIUM CHLORIDE: 600; 310; 30; 20 INJECTION, SOLUTION INTRAVENOUS at 11:59

## 2024-01-01 RX ADMIN — FLUDROCORTISONE ACETATE 0.1 MG: 0.1 TABLET ORAL at 08:53

## 2024-01-01 RX ADMIN — FLUDROCORTISONE ACETATE 0.1 MG: 0.1 TABLET ORAL at 08:08

## 2024-01-01 RX ADMIN — DULOXETINE HYDROCHLORIDE 60 MG: 60 CAPSULE, DELAYED RELEASE ORAL at 09:08

## 2024-01-01 RX ADMIN — GABAPENTIN 300 MG: 300 CAPSULE ORAL at 20:33

## 2024-01-01 RX ADMIN — VANCOMYCIN HYDROCHLORIDE 125 MG: KIT at 09:00

## 2024-01-01 RX ADMIN — FLUTICASONE FUROATE AND VILANTEROL TRIFENATATE 1 PUFF: 200; 25 POWDER RESPIRATORY (INHALATION) at 11:06

## 2024-01-01 RX ADMIN — VANCOMYCIN HYDROCHLORIDE 125 MG: KIT at 08:52

## 2024-01-01 RX ADMIN — FAMOTIDINE 20 MG: 20 TABLET ORAL at 08:59

## 2024-01-01 RX ADMIN — UMECLIDINIUM 1 PUFF: 62.5 AEROSOL, POWDER ORAL at 08:02

## 2024-01-01 RX ADMIN — TAMSULOSIN HYDROCHLORIDE 0.4 MG: 0.4 CAPSULE ORAL at 08:38

## 2024-01-01 RX ADMIN — DULOXETINE HYDROCHLORIDE 60 MG: 60 CAPSULE, DELAYED RELEASE ORAL at 08:38

## 2024-01-01 RX ADMIN — DULOXETINE HYDROCHLORIDE 40 MG: 20 CAPSULE, DELAYED RELEASE ORAL at 08:08

## 2024-01-01 RX ADMIN — ATORVASTATIN CALCIUM 10 MG: 10 TABLET, FILM COATED ORAL at 09:35

## 2024-01-01 RX ADMIN — IPRATROPIUM BROMIDE AND ALBUTEROL SULFATE 3 ML: .5; 3 SOLUTION RESPIRATORY (INHALATION) at 19:45

## 2024-01-01 RX ADMIN — CEFEPIME 2 G: 2 INJECTION, POWDER, FOR SOLUTION INTRAVENOUS at 20:05

## 2024-01-01 RX ADMIN — VANCOMYCIN HYDROCHLORIDE 125 MG: KIT at 22:14

## 2024-01-01 RX ADMIN — FLUDROCORTISONE ACETATE 0.1 MG: 0.1 TABLET ORAL at 09:45

## 2024-01-01 RX ADMIN — VANCOMYCIN HYDROCHLORIDE 125 MG: KIT at 16:40

## 2024-01-01 RX ADMIN — ALBUTEROL SULFATE 2.5 MG: 2.5 SOLUTION RESPIRATORY (INHALATION) at 06:54

## 2024-01-01 RX ADMIN — VANCOMYCIN HYDROCHLORIDE 125 MG: KIT at 08:15

## 2024-01-01 RX ADMIN — MULTIVITAMIN TABLET 1 TABLET: TABLET at 08:25

## 2024-01-01 RX ADMIN — DULOXETINE HYDROCHLORIDE 40 MG: 20 CAPSULE, DELAYED RELEASE ORAL at 12:26

## 2024-01-01 RX ADMIN — CEFEPIME 2 G: 2 INJECTION, POWDER, FOR SOLUTION INTRAVENOUS at 20:36

## 2024-01-01 RX ADMIN — TAMSULOSIN HYDROCHLORIDE 0.4 MG: 0.4 CAPSULE ORAL at 08:50

## 2024-01-01 RX ADMIN — VANCOMYCIN HYDROCHLORIDE 125 MG: 125 CAPSULE ORAL at 22:33

## 2024-01-01 RX ADMIN — GABAPENTIN 400 MG: 100 CAPSULE ORAL at 20:34

## 2024-01-01 RX ADMIN — ATORVASTATIN CALCIUM 10 MG: 10 TABLET, FILM COATED ORAL at 08:01

## 2024-01-01 RX ADMIN — METRONIDAZOLE 500 MG: 500 INJECTION, SOLUTION INTRAVENOUS at 04:28

## 2024-01-01 RX ADMIN — SODIUM CHLORIDE: 9 INJECTION, SOLUTION INTRAVENOUS at 03:00

## 2024-01-01 RX ADMIN — FAMOTIDINE 20 MG: 10 INJECTION INTRAVENOUS at 19:50

## 2024-01-01 RX ADMIN — UMECLIDINIUM 1 PUFF: 62.5 AEROSOL, POWDER ORAL at 08:09

## 2024-01-01 RX ADMIN — FAMOTIDINE 20 MG: 20 TABLET ORAL at 09:47

## 2024-01-01 RX ADMIN — GABAPENTIN 400 MG: 100 CAPSULE ORAL at 21:44

## 2024-01-01 RX ADMIN — FLUDROCORTISONE ACETATE 0.1 MG: 0.1 TABLET ORAL at 09:02

## 2024-01-01 RX ADMIN — TAMSULOSIN HYDROCHLORIDE 0.4 MG: 0.4 CAPSULE ORAL at 09:58

## 2024-01-01 RX ADMIN — UMECLIDINIUM 1 PUFF: 62.5 AEROSOL, POWDER ORAL at 09:43

## 2024-01-01 RX ADMIN — VANCOMYCIN HYDROCHLORIDE 125 MG: KIT at 21:30

## 2024-01-01 RX ADMIN — SODIUM CHLORIDE 1000 ML: 9 INJECTION, SOLUTION INTRAVENOUS at 19:02

## 2024-01-01 RX ADMIN — METRONIDAZOLE 500 MG: 500 INJECTION, SOLUTION INTRAVENOUS at 15:21

## 2024-01-01 RX ADMIN — FAMOTIDINE 20 MG: 20 TABLET ORAL at 14:00

## 2024-01-01 RX ADMIN — VANCOMYCIN HYDROCHLORIDE 125 MG: 125 CAPSULE ORAL at 00:16

## 2024-01-01 RX ADMIN — FAMOTIDINE 20 MG: 20 TABLET ORAL at 09:46

## 2024-01-01 RX ADMIN — CEFEPIME 2 G: 2 INJECTION, POWDER, FOR SOLUTION INTRAVENOUS at 03:38

## 2024-01-01 RX ADMIN — METRONIDAZOLE 500 MG: 500 INJECTION, SOLUTION INTRAVENOUS at 04:16

## 2024-01-01 RX ADMIN — TAMSULOSIN HYDROCHLORIDE 0.4 MG: 0.4 CAPSULE ORAL at 09:46

## 2024-01-01 RX ADMIN — FAMOTIDINE 20 MG: 10 INJECTION INTRAVENOUS at 10:54

## 2024-01-01 RX ADMIN — TAMSULOSIN HYDROCHLORIDE 0.4 MG: 0.4 CAPSULE ORAL at 08:22

## 2024-01-01 RX ADMIN — TAMSULOSIN HYDROCHLORIDE 0.4 MG: 0.4 CAPSULE ORAL at 09:00

## 2024-01-01 RX ADMIN — VANCOMYCIN HYDROCHLORIDE 125 MG: KIT at 08:20

## 2024-01-01 RX ADMIN — GABAPENTIN 600 MG: 300 CAPSULE ORAL at 21:13

## 2024-01-01 RX ADMIN — IPRATROPIUM BROMIDE AND ALBUTEROL SULFATE 3 ML: .5; 3 SOLUTION RESPIRATORY (INHALATION) at 16:19

## 2024-01-01 RX ADMIN — FLUDROCORTISONE ACETATE 0.1 MG: 0.1 TABLET ORAL at 09:46

## 2024-01-01 RX ADMIN — FAMOTIDINE 20 MG: 10 INJECTION INTRAVENOUS at 00:09

## 2024-01-01 RX ADMIN — IPRATROPIUM BROMIDE AND ALBUTEROL SULFATE 3 ML: .5; 3 SOLUTION RESPIRATORY (INHALATION) at 19:04

## 2024-01-01 RX ADMIN — ACETAMINOPHEN 650 MG: 650 SUPPOSITORY RECTAL at 20:52

## 2024-01-01 RX ADMIN — FAMOTIDINE 20 MG: 20 TABLET ORAL at 09:27

## 2024-01-01 RX ADMIN — DULOXETINE HYDROCHLORIDE 60 MG: 60 CAPSULE, DELAYED RELEASE ORAL at 09:45

## 2024-01-01 RX ADMIN — ATORVASTATIN CALCIUM 10 MG: 10 TABLET, FILM COATED ORAL at 08:40

## 2024-01-01 RX ADMIN — CEFEPIME 2 G: 2 INJECTION, POWDER, FOR SOLUTION INTRAVENOUS at 17:28

## 2024-01-01 RX ADMIN — TAMSULOSIN HYDROCHLORIDE 0.4 MG: 0.4 CAPSULE ORAL at 07:59

## 2024-01-01 RX ADMIN — DULOXETINE HYDROCHLORIDE 40 MG: 20 CAPSULE, DELAYED RELEASE ORAL at 09:46

## 2024-01-01 RX ADMIN — VANCOMYCIN HYDROCHLORIDE 125 MG: KIT at 15:48

## 2024-01-01 RX ADMIN — VANCOMYCIN HYDROCHLORIDE 125 MG: 125 CAPSULE ORAL at 14:47

## 2024-01-01 RX ADMIN — SODIUM CHLORIDE, POTASSIUM CHLORIDE, SODIUM LACTATE AND CALCIUM CHLORIDE: 600; 310; 30; 20 INJECTION, SOLUTION INTRAVENOUS at 23:45

## 2024-01-01 RX ADMIN — DULOXETINE HYDROCHLORIDE 40 MG: 20 CAPSULE, DELAYED RELEASE ORAL at 08:09

## 2024-01-01 RX ADMIN — IPRATROPIUM BROMIDE AND ALBUTEROL SULFATE 3 ML: .5; 3 SOLUTION RESPIRATORY (INHALATION) at 07:40

## 2024-01-01 RX ADMIN — FAMOTIDINE 20 MG: 10 INJECTION INTRAVENOUS at 08:25

## 2024-01-01 RX ADMIN — IPRATROPIUM BROMIDE AND ALBUTEROL SULFATE 3 ML: .5; 3 SOLUTION RESPIRATORY (INHALATION) at 11:37

## 2024-01-01 RX ADMIN — DULOXETINE HYDROCHLORIDE 40 MG: 20 CAPSULE, DELAYED RELEASE ORAL at 08:01

## 2024-01-01 RX ADMIN — VANCOMYCIN HYDROCHLORIDE 125 MG: KIT at 13:55

## 2024-01-01 RX ADMIN — VANCOMYCIN HYDROCHLORIDE 125 MG: KIT at 20:36

## 2024-01-01 RX ADMIN — UMECLIDINIUM 1 PUFF: 62.5 AEROSOL, POWDER ORAL at 08:16

## 2024-01-01 RX ADMIN — TAMSULOSIN HYDROCHLORIDE 0.4 MG: 0.4 CAPSULE ORAL at 10:54

## 2024-01-01 RX ADMIN — UMECLIDINIUM 1 PUFF: 62.5 AEROSOL, POWDER ORAL at 09:02

## 2024-01-01 RX ADMIN — FAMOTIDINE 20 MG: 20 TABLET ORAL at 08:01

## 2024-01-01 RX ADMIN — IPRATROPIUM BROMIDE AND ALBUTEROL SULFATE 3 ML: .5; 3 SOLUTION RESPIRATORY (INHALATION) at 07:34

## 2024-01-01 RX ADMIN — METRONIDAZOLE 500 MG: 500 INJECTION, SOLUTION INTRAVENOUS at 10:55

## 2024-01-01 RX ADMIN — FAMOTIDINE 20 MG: 20 TABLET ORAL at 08:49

## 2024-01-01 RX ADMIN — POTASSIUM CHLORIDE 10 MEQ: 7.46 INJECTION, SOLUTION INTRAVENOUS at 12:29

## 2024-01-01 RX ADMIN — ALBUTEROL SULFATE 2.5 MG: 2.5 SOLUTION RESPIRATORY (INHALATION) at 08:06

## 2024-01-01 RX ADMIN — ATORVASTATIN CALCIUM 10 MG: 10 TABLET, FILM COATED ORAL at 09:00

## 2024-01-01 RX ADMIN — GABAPENTIN 600 MG: 300 CAPSULE ORAL at 21:14

## 2024-01-01 RX ADMIN — METRONIDAZOLE 500 MG: 500 INJECTION, SOLUTION INTRAVENOUS at 05:06

## 2024-01-01 RX ADMIN — FAMOTIDINE 20 MG: 20 TABLET ORAL at 08:40

## 2024-01-01 RX ADMIN — METRONIDAZOLE 500 MG: 500 INJECTION, SOLUTION INTRAVENOUS at 20:29

## 2024-01-01 RX ADMIN — METRONIDAZOLE 500 MG: 500 INJECTION, SOLUTION INTRAVENOUS at 04:33

## 2024-01-01 RX ADMIN — VANCOMYCIN HYDROCHLORIDE 125 MG: KIT at 19:55

## 2024-01-01 RX ADMIN — VANCOMYCIN HYDROCHLORIDE 125 MG: KIT at 11:30

## 2024-01-01 RX ADMIN — METRONIDAZOLE 500 MG: 500 INJECTION, SOLUTION INTRAVENOUS at 04:13

## 2024-01-01 RX ADMIN — TAMSULOSIN HYDROCHLORIDE 0.4 MG: 0.4 CAPSULE ORAL at 08:25

## 2024-01-01 RX ADMIN — CEFEPIME 2 G: 2 INJECTION, POWDER, FOR SOLUTION INTRAVENOUS at 04:13

## 2024-01-01 RX ADMIN — FLUDROCORTISONE ACETATE 0.1 MG: 0.1 TABLET ORAL at 09:00

## 2024-01-01 RX ADMIN — TAMSULOSIN HYDROCHLORIDE 0.4 MG: 0.4 CAPSULE ORAL at 08:55

## 2024-01-01 RX ADMIN — METRONIDAZOLE 500 MG: 500 INJECTION, SOLUTION INTRAVENOUS at 21:30

## 2024-01-01 RX ADMIN — UMECLIDINIUM 1 PUFF: 62.5 AEROSOL, POWDER ORAL at 09:16

## 2024-01-01 RX ADMIN — FLUDROCORTISONE ACETATE 0.1 MG: 0.1 TABLET ORAL at 14:01

## 2024-01-01 RX ADMIN — FAMOTIDINE 20 MG: 10 INJECTION INTRAVENOUS at 08:22

## 2024-01-01 RX ADMIN — SODIUM CHLORIDE 84 ML: 9 INJECTION, SOLUTION INTRAVENOUS at 22:23

## 2024-01-01 RX ADMIN — DULOXETINE HYDROCHLORIDE 40 MG: 20 CAPSULE, DELAYED RELEASE ORAL at 09:47

## 2024-01-01 RX ADMIN — VANCOMYCIN HYDROCHLORIDE 125 MG: KIT at 20:34

## 2024-01-01 RX ADMIN — VANCOMYCIN HYDROCHLORIDE 125 MG: KIT at 20:08

## 2024-01-01 RX ADMIN — METRONIDAZOLE 500 MG: 500 INJECTION, SOLUTION INTRAVENOUS at 05:20

## 2024-01-01 RX ADMIN — METRONIDAZOLE 500 MG: 500 INJECTION, SOLUTION INTRAVENOUS at 17:02

## 2024-01-01 RX ADMIN — VANCOMYCIN HYDROCHLORIDE 125 MG: KIT at 17:06

## 2024-01-01 RX ADMIN — TAMSULOSIN HYDROCHLORIDE 0.4 MG: 0.4 CAPSULE ORAL at 08:02

## 2024-01-01 RX ADMIN — GABAPENTIN 300 MG: 300 CAPSULE ORAL at 21:00

## 2024-01-01 RX ADMIN — CEFEPIME 2 G: 2 INJECTION, POWDER, FOR SOLUTION INTRAVENOUS at 21:44

## 2024-01-01 RX ADMIN — SODIUM CHLORIDE 1000 ML: 9 INJECTION, SOLUTION INTRAVENOUS at 21:33

## 2024-01-01 RX ADMIN — VANCOMYCIN HYDROCHLORIDE 125 MG: 125 CAPSULE ORAL at 23:47

## 2024-01-01 RX ADMIN — METRONIDAZOLE 500 MG: 500 INJECTION, SOLUTION INTRAVENOUS at 21:08

## 2024-01-01 RX ADMIN — FLUDROCORTISONE ACETATE 0.1 MG: 0.1 TABLET ORAL at 10:54

## 2024-01-01 RX ADMIN — METRONIDAZOLE 500 MG: 500 INJECTION, SOLUTION INTRAVENOUS at 12:32

## 2024-01-01 RX ADMIN — VANCOMYCIN HYDROCHLORIDE 125 MG: KIT at 08:01

## 2024-01-01 RX ADMIN — SODIUM CHLORIDE, POTASSIUM CHLORIDE, SODIUM LACTATE AND CALCIUM CHLORIDE: 600; 310; 30; 20 INJECTION, SOLUTION INTRAVENOUS at 08:31

## 2024-01-01 RX ADMIN — GABAPENTIN 300 MG: 300 CAPSULE ORAL at 22:48

## 2024-01-01 RX ADMIN — GADOBUTROL 7 ML: 604.72 INJECTION INTRAVENOUS at 02:20

## 2024-01-01 RX ADMIN — VANCOMYCIN HYDROCHLORIDE 125 MG: 125 CAPSULE ORAL at 19:59

## 2024-01-01 RX ADMIN — VANCOMYCIN HYDROCHLORIDE 125 MG: KIT at 08:49

## 2024-01-01 RX ADMIN — GABAPENTIN 600 MG: 300 CAPSULE ORAL at 21:59

## 2024-01-01 RX ADMIN — VANCOMYCIN HYDROCHLORIDE 125 MG: KIT at 08:09

## 2024-01-01 ASSESSMENT — ACTIVITIES OF DAILY LIVING (ADL)
ADLS_ACUITY_SCORE: 59
ADLS_ACUITY_SCORE: 51
ADLS_ACUITY_SCORE: 58
ADLS_ACUITY_SCORE: 51
ADLS_ACUITY_SCORE: 57
ADLS_ACUITY_SCORE: 51
ADLS_ACUITY_SCORE: 53
ADLS_ACUITY_SCORE: 35
ADLS_ACUITY_SCORE: 61
ADLS_ACUITY_SCORE: 56
ADLS_ACUITY_SCORE: 51
ADLS_ACUITY_SCORE: 53
ADLS_ACUITY_SCORE: 33
ADLS_ACUITY_SCORE: 52
ADLS_ACUITY_SCORE: 62
ADLS_ACUITY_SCORE: 33
ADLS_ACUITY_SCORE: 53
ADLS_ACUITY_SCORE: 51
DEPENDENT_IADLS:: CLEANING;COOKING;LAUNDRY;SHOPPING;MEAL PREPARATION;TRANSPORTATION
ADLS_ACUITY_SCORE: 56
ADLS_ACUITY_SCORE: 60
ADLS_ACUITY_SCORE: 56
ADLS_ACUITY_SCORE: 51
ADLS_ACUITY_SCORE: 61
ADLS_ACUITY_SCORE: 56
ADLS_ACUITY_SCORE: 33
ADLS_ACUITY_SCORE: 56
ADLS_ACUITY_SCORE: 53
ADLS_ACUITY_SCORE: 63
ADLS_ACUITY_SCORE: 57
ADLS_ACUITY_SCORE: 35
ADLS_ACUITY_SCORE: 53
ADLS_ACUITY_SCORE: 63
ADLS_ACUITY_SCORE: 53
ADLS_ACUITY_SCORE: 33
ADLS_ACUITY_SCORE: 64
ADLS_ACUITY_SCORE: 52
ADLS_ACUITY_SCORE: 58
ADLS_ACUITY_SCORE: 33
ADLS_ACUITY_SCORE: 55
ADLS_ACUITY_SCORE: 38
ADLS_ACUITY_SCORE: 49
ADLS_ACUITY_SCORE: 52
ADLS_ACUITY_SCORE: 45
ADLS_ACUITY_SCORE: 57
ADLS_ACUITY_SCORE: 58
ADLS_ACUITY_SCORE: 57
ADLS_ACUITY_SCORE: 38
ADLS_ACUITY_SCORE: 53
ADLS_ACUITY_SCORE: 38
ADLS_ACUITY_SCORE: 54
ADLS_ACUITY_SCORE: 57
ADLS_ACUITY_SCORE: 51
ADLS_ACUITY_SCORE: 54
ADLS_ACUITY_SCORE: 33
ADLS_ACUITY_SCORE: 54
ADLS_ACUITY_SCORE: 56
ADLS_ACUITY_SCORE: 56
ADLS_ACUITY_SCORE: 63
ADLS_ACUITY_SCORE: 56
ADLS_ACUITY_SCORE: 39
ADLS_ACUITY_SCORE: 33
ADLS_ACUITY_SCORE: 58
ADLS_ACUITY_SCORE: 38
ADLS_ACUITY_SCORE: 57
ADLS_ACUITY_SCORE: 54
ADLS_ACUITY_SCORE: 51
ADLS_ACUITY_SCORE: 45
ADLS_ACUITY_SCORE: 55
ADLS_ACUITY_SCORE: 56
ADLS_ACUITY_SCORE: 51
ADLS_ACUITY_SCORE: 49
ADLS_ACUITY_SCORE: 62
ADLS_ACUITY_SCORE: 62
ADLS_ACUITY_SCORE: 61
ADLS_ACUITY_SCORE: 56
ADLS_ACUITY_SCORE: 52
ADLS_ACUITY_SCORE: 56
ADLS_ACUITY_SCORE: 49
ADLS_ACUITY_SCORE: 39
ADLS_ACUITY_SCORE: 55
ADLS_ACUITY_SCORE: 33
ADLS_ACUITY_SCORE: 51
ADLS_ACUITY_SCORE: 64
ADLS_ACUITY_SCORE: 58
ADLS_ACUITY_SCORE: 64
ADLS_ACUITY_SCORE: 54
ADLS_ACUITY_SCORE: 56
ADLS_ACUITY_SCORE: 55
ADLS_ACUITY_SCORE: 55
ADLS_ACUITY_SCORE: 57
ADLS_ACUITY_SCORE: 52
ADLS_ACUITY_SCORE: 45
ADLS_ACUITY_SCORE: 52
ADLS_ACUITY_SCORE: 52
ADLS_ACUITY_SCORE: 54
ADLS_ACUITY_SCORE: 60
ADLS_ACUITY_SCORE: 55
ADLS_ACUITY_SCORE: 57
ADLS_ACUITY_SCORE: 57
ADLS_ACUITY_SCORE: 37
ADLS_ACUITY_SCORE: 57
ADLS_ACUITY_SCORE: 55
ADLS_ACUITY_SCORE: 55
ADLS_ACUITY_SCORE: 54
ADLS_ACUITY_SCORE: 56
ADLS_ACUITY_SCORE: 52
ADLS_ACUITY_SCORE: 54
ADLS_ACUITY_SCORE: 51
ADLS_ACUITY_SCORE: 52
ADLS_ACUITY_SCORE: 60
ADLS_ACUITY_SCORE: 56
ADLS_ACUITY_SCORE: 49
ADLS_ACUITY_SCORE: 55
ADLS_ACUITY_SCORE: 57
ADLS_ACUITY_SCORE: 56
ADLS_ACUITY_SCORE: 61
ADLS_ACUITY_SCORE: 56
ADLS_ACUITY_SCORE: 37
ADLS_ACUITY_SCORE: 56
ADLS_ACUITY_SCORE: 56
ADLS_ACUITY_SCORE: 53
ADLS_ACUITY_SCORE: 61
ADLS_ACUITY_SCORE: 52
ADLS_ACUITY_SCORE: 59
ADLS_ACUITY_SCORE: 56
ADLS_ACUITY_SCORE: 52
ADLS_ACUITY_SCORE: 56
ADLS_ACUITY_SCORE: 63
ADLS_ACUITY_SCORE: 45
ADLS_ACUITY_SCORE: 59
ADLS_ACUITY_SCORE: 59
ADLS_ACUITY_SCORE: 56
ADLS_ACUITY_SCORE: 53
ADLS_ACUITY_SCORE: 56
ADLS_ACUITY_SCORE: 53
ADLS_ACUITY_SCORE: 54
ADLS_ACUITY_SCORE: 55
ADLS_ACUITY_SCORE: 55
ADLS_ACUITY_SCORE: 38
ADLS_ACUITY_SCORE: 52
ADLS_ACUITY_SCORE: 62
ADLS_ACUITY_SCORE: 55
ADLS_ACUITY_SCORE: 54
ADLS_ACUITY_SCORE: 54
ADLS_ACUITY_SCORE: 56
ADLS_ACUITY_SCORE: 54
ADLS_ACUITY_SCORE: 62
ADLS_ACUITY_SCORE: 45
ADLS_ACUITY_SCORE: 56
ADLS_ACUITY_SCORE: 45
ADLS_ACUITY_SCORE: 61
ADLS_ACUITY_SCORE: 51
ADLS_ACUITY_SCORE: 54
ADLS_ACUITY_SCORE: 53
ADLS_ACUITY_SCORE: 35
ADLS_ACUITY_SCORE: 41
ADLS_ACUITY_SCORE: 62
ADLS_ACUITY_SCORE: 49
ADLS_ACUITY_SCORE: 59
ADLS_ACUITY_SCORE: 62
ADLS_ACUITY_SCORE: 57
ADLS_ACUITY_SCORE: 60
ADLS_ACUITY_SCORE: 56
ADLS_ACUITY_SCORE: 38
ADLS_ACUITY_SCORE: 38
ADLS_ACUITY_SCORE: 62
ADLS_ACUITY_SCORE: 45
ADLS_ACUITY_SCORE: 56
ADLS_ACUITY_SCORE: 52
ADLS_ACUITY_SCORE: 45
ADLS_ACUITY_SCORE: 38
ADLS_ACUITY_SCORE: 45
ADLS_ACUITY_SCORE: 37
ADLS_ACUITY_SCORE: 62
ADLS_ACUITY_SCORE: 55
ADLS_ACUITY_SCORE: 60
ADLS_ACUITY_SCORE: 54
ADLS_ACUITY_SCORE: 49
ADLS_ACUITY_SCORE: 57
ADLS_ACUITY_SCORE: 56
ADLS_ACUITY_SCORE: 35
ADLS_ACUITY_SCORE: 57
ADLS_ACUITY_SCORE: 56
ADLS_ACUITY_SCORE: 51
DEPENDENT_IADLS:: CLEANING;COOKING;LAUNDRY;SHOPPING;MEAL PREPARATION;TRANSPORTATION
ADLS_ACUITY_SCORE: 58
ADLS_ACUITY_SCORE: 62
ADLS_ACUITY_SCORE: 57
ADLS_ACUITY_SCORE: 58
ADLS_ACUITY_SCORE: 54
ADLS_ACUITY_SCORE: 53
ADLS_ACUITY_SCORE: 35
ADLS_ACUITY_SCORE: 52
ADLS_ACUITY_SCORE: 61
ADLS_ACUITY_SCORE: 35
ADLS_ACUITY_SCORE: 60
ADLS_ACUITY_SCORE: 54
ADLS_ACUITY_SCORE: 39
ADLS_ACUITY_SCORE: 52
ADLS_ACUITY_SCORE: 56
ADLS_ACUITY_SCORE: 55
ADLS_ACUITY_SCORE: 60
ADLS_ACUITY_SCORE: 49
ADLS_ACUITY_SCORE: 45
ADLS_ACUITY_SCORE: 60
ADLS_ACUITY_SCORE: 49
ADLS_ACUITY_SCORE: 51
ADLS_ACUITY_SCORE: 38
ADLS_ACUITY_SCORE: 54
ADLS_ACUITY_SCORE: 62
ADLS_ACUITY_SCORE: 49
ADLS_ACUITY_SCORE: 38
ADLS_ACUITY_SCORE: 54
ADLS_ACUITY_SCORE: 51
ADLS_ACUITY_SCORE: 49
ADLS_ACUITY_SCORE: 33
ADLS_ACUITY_SCORE: 51
ADLS_ACUITY_SCORE: 56
ADLS_ACUITY_SCORE: 62
ADLS_ACUITY_SCORE: 63
ADLS_ACUITY_SCORE: 60
ADLS_ACUITY_SCORE: 55
ADLS_ACUITY_SCORE: 56
ADLS_ACUITY_SCORE: 55
ADLS_ACUITY_SCORE: 37
ADLS_ACUITY_SCORE: 52
ADLS_ACUITY_SCORE: 54
ADLS_ACUITY_SCORE: 60
ADLS_ACUITY_SCORE: 62
ADLS_ACUITY_SCORE: 58
ADLS_ACUITY_SCORE: 33
ADLS_ACUITY_SCORE: 60
ADLS_ACUITY_SCORE: 61
ADLS_ACUITY_SCORE: 64
ADLS_ACUITY_SCORE: 62
ADLS_ACUITY_SCORE: 57
ADLS_ACUITY_SCORE: 33
ADLS_ACUITY_SCORE: 35
ADLS_ACUITY_SCORE: 35
ADLS_ACUITY_SCORE: 56
ADLS_ACUITY_SCORE: 51
ADLS_ACUITY_SCORE: 52
ADLS_ACUITY_SCORE: 57
ADLS_ACUITY_SCORE: 53
ADLS_ACUITY_SCORE: 56
ADLS_ACUITY_SCORE: 57
ADLS_ACUITY_SCORE: 45
ADLS_ACUITY_SCORE: 55
ADLS_ACUITY_SCORE: 38
ADLS_ACUITY_SCORE: 59
ADLS_ACUITY_SCORE: 56
ADLS_ACUITY_SCORE: 54
ADLS_ACUITY_SCORE: 33
ADLS_ACUITY_SCORE: 53
ADLS_ACUITY_SCORE: 54
ADLS_ACUITY_SCORE: 39
ADLS_ACUITY_SCORE: 57
ADLS_ACUITY_SCORE: 63
ADLS_ACUITY_SCORE: 51
ADLS_ACUITY_SCORE: 49
ADLS_ACUITY_SCORE: 51
ADLS_ACUITY_SCORE: 53
ADLS_ACUITY_SCORE: 57
ADLS_ACUITY_SCORE: 60
ADLS_ACUITY_SCORE: 54
ADLS_ACUITY_SCORE: 54
ADLS_ACUITY_SCORE: 53
ADLS_ACUITY_SCORE: 54
ADLS_ACUITY_SCORE: 54
ADLS_ACUITY_SCORE: 58
ADLS_ACUITY_SCORE: 62
ADLS_ACUITY_SCORE: 35
ADLS_ACUITY_SCORE: 54
ADLS_ACUITY_SCORE: 53
ADLS_ACUITY_SCORE: 56
ADLS_ACUITY_SCORE: 54
ADLS_ACUITY_SCORE: 51
ADLS_ACUITY_SCORE: 49
ADLS_ACUITY_SCORE: 56
ADLS_ACUITY_SCORE: 45
ADLS_ACUITY_SCORE: 55
ADLS_ACUITY_SCORE: 63
ADLS_ACUITY_SCORE: 53
ADLS_ACUITY_SCORE: 56
ADLS_ACUITY_SCORE: 30
ADLS_ACUITY_SCORE: 45
ADLS_ACUITY_SCORE: 59
ADLS_ACUITY_SCORE: 62
ADLS_ACUITY_SCORE: 33
ADLS_ACUITY_SCORE: 51
ADLS_ACUITY_SCORE: 64
ADLS_ACUITY_SCORE: 56
ADLS_ACUITY_SCORE: 64
ADLS_ACUITY_SCORE: 52
ADLS_ACUITY_SCORE: 58
ADLS_ACUITY_SCORE: 53
ADLS_ACUITY_SCORE: 51
ADLS_ACUITY_SCORE: 53
ADLS_ACUITY_SCORE: 45
ADLS_ACUITY_SCORE: 54
ADLS_ACUITY_SCORE: 49
ADLS_ACUITY_SCORE: 54
ADLS_ACUITY_SCORE: 35
ADLS_ACUITY_SCORE: 61
ADLS_ACUITY_SCORE: 63
ADLS_ACUITY_SCORE: 56
ADLS_ACUITY_SCORE: 53
ADLS_ACUITY_SCORE: 59
ADLS_ACUITY_SCORE: 38
ADLS_ACUITY_SCORE: 55
ADLS_ACUITY_SCORE: 56
ADLS_ACUITY_SCORE: 54
ADLS_ACUITY_SCORE: 60
DEPENDENT_IADLS:: CLEANING;COOKING;LAUNDRY;SHOPPING;MEAL PREPARATION;MEDICATION MANAGEMENT;TRANSPORTATION
ADLS_ACUITY_SCORE: 41
ADLS_ACUITY_SCORE: 64
ADLS_ACUITY_SCORE: 55
ADLS_ACUITY_SCORE: 60
ADLS_ACUITY_SCORE: 54
ADLS_ACUITY_SCORE: 33
ADLS_ACUITY_SCORE: 56
ADLS_ACUITY_SCORE: 56
ADLS_ACUITY_SCORE: 52
ADLS_ACUITY_SCORE: 56
ADLS_ACUITY_SCORE: 58
ADLS_ACUITY_SCORE: 59
ADLS_ACUITY_SCORE: 57
ADLS_ACUITY_SCORE: 41
ADLS_ACUITY_SCORE: 52
ADLS_ACUITY_SCORE: 35
ADLS_ACUITY_SCORE: 61
ADLS_ACUITY_SCORE: 52
ADLS_ACUITY_SCORE: 51
ADLS_ACUITY_SCORE: 63
ADLS_ACUITY_SCORE: 52
ADLS_ACUITY_SCORE: 63
ADLS_ACUITY_SCORE: 54
ADLS_ACUITY_SCORE: 52
ADLS_ACUITY_SCORE: 55
ADLS_ACUITY_SCORE: 60
ADLS_ACUITY_SCORE: 52
ADLS_ACUITY_SCORE: 52
ADLS_ACUITY_SCORE: 64
ADLS_ACUITY_SCORE: 57
ADLS_ACUITY_SCORE: 63
ADLS_ACUITY_SCORE: 54
ADLS_ACUITY_SCORE: 27
ADLS_ACUITY_SCORE: 55
ADLS_ACUITY_SCORE: 52
ADLS_ACUITY_SCORE: 57
ADLS_ACUITY_SCORE: 60
ADLS_ACUITY_SCORE: 33
ADLS_ACUITY_SCORE: 56
ADLS_ACUITY_SCORE: 63
ADLS_ACUITY_SCORE: 56
ADLS_ACUITY_SCORE: 61
ADLS_ACUITY_SCORE: 61
ADLS_ACUITY_SCORE: 59
ADLS_ACUITY_SCORE: 62
ADLS_ACUITY_SCORE: 57
ADLS_ACUITY_SCORE: 35
ADLS_ACUITY_SCORE: 57
ADLS_ACUITY_SCORE: 39
ADLS_ACUITY_SCORE: 41
ADLS_ACUITY_SCORE: 56
ADLS_ACUITY_SCORE: 49
ADLS_ACUITY_SCORE: 60
ADLS_ACUITY_SCORE: 51
ADLS_ACUITY_SCORE: 33
ADLS_ACUITY_SCORE: 49
ADLS_ACUITY_SCORE: 53
ADLS_ACUITY_SCORE: 53
ADLS_ACUITY_SCORE: 49
ADLS_ACUITY_SCORE: 53
ADLS_ACUITY_SCORE: 56
ADLS_ACUITY_SCORE: 54
ADLS_ACUITY_SCORE: 56
ADLS_ACUITY_SCORE: 58
ADLS_ACUITY_SCORE: 61
ADLS_ACUITY_SCORE: 54
ADLS_ACUITY_SCORE: 33
ADLS_ACUITY_SCORE: 30
ADLS_ACUITY_SCORE: 62
ADLS_ACUITY_SCORE: 58
ADLS_ACUITY_SCORE: 57
ADLS_ACUITY_SCORE: 54
ADLS_ACUITY_SCORE: 38
ADLS_ACUITY_SCORE: 54
ADLS_ACUITY_SCORE: 38
ADLS_ACUITY_SCORE: 35
ADLS_ACUITY_SCORE: 54
ADLS_ACUITY_SCORE: 62
ADLS_ACUITY_SCORE: 51
ADLS_ACUITY_SCORE: 38
ADLS_ACUITY_SCORE: 56
ADLS_ACUITY_SCORE: 56
ADLS_ACUITY_SCORE: 61
ADLS_ACUITY_SCORE: 56
ADLS_ACUITY_SCORE: 56
ADLS_ACUITY_SCORE: 54
ADLS_ACUITY_SCORE: 53
ADLS_ACUITY_SCORE: 38
ADLS_ACUITY_SCORE: 54
ADLS_ACUITY_SCORE: 63
ADLS_ACUITY_SCORE: 30
ADLS_ACUITY_SCORE: 46
ADLS_ACUITY_SCORE: 59
ADLS_ACUITY_SCORE: 52
ADLS_ACUITY_SCORE: 54
ADLS_ACUITY_SCORE: 54
ADLS_ACUITY_SCORE: 63
ADLS_ACUITY_SCORE: 59
ADLS_ACUITY_SCORE: 63
ADLS_ACUITY_SCORE: 57
ADLS_ACUITY_SCORE: 52
ADLS_ACUITY_SCORE: 64
ADLS_ACUITY_SCORE: 52
ADLS_ACUITY_SCORE: 51
ADLS_ACUITY_SCORE: 53
ADLS_ACUITY_SCORE: 49
ADLS_ACUITY_SCORE: 54
ADLS_ACUITY_SCORE: 61
ADLS_ACUITY_SCORE: 49
ADLS_ACUITY_SCORE: 49
ADLS_ACUITY_SCORE: 62
ADLS_ACUITY_SCORE: 61
ADLS_ACUITY_SCORE: 55
ADLS_ACUITY_SCORE: 49
ADLS_ACUITY_SCORE: 52
ADLS_ACUITY_SCORE: 58
ADLS_ACUITY_SCORE: 57
ADLS_ACUITY_SCORE: 52
ADLS_ACUITY_SCORE: 63
ADLS_ACUITY_SCORE: 54
ADLS_ACUITY_SCORE: 56
ADLS_ACUITY_SCORE: 55
ADLS_ACUITY_SCORE: 56
ADLS_ACUITY_SCORE: 59
ADLS_ACUITY_SCORE: 56
ADLS_ACUITY_SCORE: 62
ADLS_ACUITY_SCORE: 57
ADLS_ACUITY_SCORE: 54
ADLS_ACUITY_SCORE: 57
ADLS_ACUITY_SCORE: 51
ADLS_ACUITY_SCORE: 53
ADLS_ACUITY_SCORE: 54
ADLS_ACUITY_SCORE: 49
ADLS_ACUITY_SCORE: 61
ADLS_ACUITY_SCORE: 49
ADLS_ACUITY_SCORE: 62
ADLS_ACUITY_SCORE: 54
ADLS_ACUITY_SCORE: 56
ADLS_ACUITY_SCORE: 49
ADLS_ACUITY_SCORE: 52
ADLS_ACUITY_SCORE: 64
ADLS_ACUITY_SCORE: 54
ADLS_ACUITY_SCORE: 51
ADLS_ACUITY_SCORE: 57
ADLS_ACUITY_SCORE: 54
ADLS_ACUITY_SCORE: 45
ADLS_ACUITY_SCORE: 46
ADLS_ACUITY_SCORE: 51
ADLS_ACUITY_SCORE: 61
ADLS_ACUITY_SCORE: 62
ADLS_ACUITY_SCORE: 38
ADLS_ACUITY_SCORE: 57
ADLS_ACUITY_SCORE: 35
ADLS_ACUITY_SCORE: 57
ADLS_ACUITY_SCORE: 56
ADLS_ACUITY_SCORE: 57
ADLS_ACUITY_SCORE: 38
ADLS_ACUITY_SCORE: 63
ADLS_ACUITY_SCORE: 54
IADL_COMMENTS: UNABLE TO ASSESS
ADLS_ACUITY_SCORE: 59
ADLS_ACUITY_SCORE: 56
ADLS_ACUITY_SCORE: 59
ADLS_ACUITY_SCORE: 57
ADLS_ACUITY_SCORE: 55
ADLS_ACUITY_SCORE: 56
ADLS_ACUITY_SCORE: 53
ADLS_ACUITY_SCORE: 55
ADLS_ACUITY_SCORE: 61
ADLS_ACUITY_SCORE: 62
ADLS_ACUITY_SCORE: 49
ADLS_ACUITY_SCORE: 49
ADLS_ACUITY_SCORE: 51
ADLS_ACUITY_SCORE: 53
ADLS_ACUITY_SCORE: 51
ADLS_ACUITY_SCORE: 52
ADLS_ACUITY_SCORE: 58
ADLS_ACUITY_SCORE: 49
ADLS_ACUITY_SCORE: 54
ADLS_ACUITY_SCORE: 56
ADLS_ACUITY_SCORE: 60
ADLS_ACUITY_SCORE: 56
ADLS_ACUITY_SCORE: 56
ADLS_ACUITY_SCORE: 53
ADLS_ACUITY_SCORE: 56
ADLS_ACUITY_SCORE: 33
ADLS_ACUITY_SCORE: 61
ADLS_ACUITY_SCORE: 58
ADLS_ACUITY_SCORE: 62
ADLS_ACUITY_SCORE: 54
ADLS_ACUITY_SCORE: 56
ADLS_ACUITY_SCORE: 59
ADLS_ACUITY_SCORE: 54
ADLS_ACUITY_SCORE: 55
ADLS_ACUITY_SCORE: 55
ADLS_ACUITY_SCORE: 59
ADLS_ACUITY_SCORE: 56
ADLS_ACUITY_SCORE: 63
ADLS_ACUITY_SCORE: 33
ADLS_ACUITY_SCORE: 56
ADLS_ACUITY_SCORE: 49
ADLS_ACUITY_SCORE: 33
ADLS_ACUITY_SCORE: 55
ADLS_ACUITY_SCORE: 49
ADLS_ACUITY_SCORE: 63
ADLS_ACUITY_SCORE: 54
ADLS_ACUITY_SCORE: 61
ADLS_ACUITY_SCORE: 53
ADLS_ACUITY_SCORE: 35
ADLS_ACUITY_SCORE: 54
ADLS_ACUITY_SCORE: 53
ADLS_ACUITY_SCORE: 61
ADLS_ACUITY_SCORE: 51
ADLS_ACUITY_SCORE: 53
ADLS_ACUITY_SCORE: 54
ADLS_ACUITY_SCORE: 54
ADLS_ACUITY_SCORE: 45
ADLS_ACUITY_SCORE: 61
ADLS_ACUITY_SCORE: 52
ADLS_ACUITY_SCORE: 59
ADLS_ACUITY_SCORE: 55
ADLS_ACUITY_SCORE: 52
ADLS_ACUITY_SCORE: 54
ADLS_ACUITY_SCORE: 54
ADLS_ACUITY_SCORE: 63
ADLS_ACUITY_SCORE: 38
ADLS_ACUITY_SCORE: 60
ADLS_ACUITY_SCORE: 52

## 2024-01-01 ASSESSMENT — COLUMBIA-SUICIDE SEVERITY RATING SCALE - C-SSRS
6. HAVE YOU EVER DONE ANYTHING, STARTED TO DO ANYTHING, OR PREPARED TO DO ANYTHING TO END YOUR LIFE?: NO
2. HAVE YOU ACTUALLY HAD ANY THOUGHTS OF KILLING YOURSELF IN THE PAST MONTH?: NO
1. IN THE PAST MONTH, HAVE YOU WISHED YOU WERE DEAD OR WISHED YOU COULD GO TO SLEEP AND NOT WAKE UP?: NO
2. HAVE YOU ACTUALLY HAD ANY THOUGHTS OF KILLING YOURSELF IN THE PAST MONTH?: NO
6. HAVE YOU EVER DONE ANYTHING, STARTED TO DO ANYTHING, OR PREPARED TO DO ANYTHING TO END YOUR LIFE?: NO
1. IN THE PAST MONTH, HAVE YOU WISHED YOU WERE DEAD OR WISHED YOU COULD GO TO SLEEP AND NOT WAKE UP?: NO

## 2024-01-03 NOTE — TELEPHONE ENCOUNTER
Per Dr. Valdes's reading from Echocardiogram:    LVEF  55 - 60%  Compared with TTE 9/13/2023. There has been no significant change.  Right ventricle is not well visualized, however it appears dilated and global systolic function is probably moderately reduced.  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 Valve not well visualized. Normal gradients, Vmax 2 m/s, mean gradient 8 mmHg, DI 0.48. No aortic  insufficiency.    Routing to Dr. Grijalva for review.    Brenda Ventura RN on 1/3/2024 at 3:58 PM

## 2024-01-08 PROBLEM — R55 SYNCOPE: Status: ACTIVE | Noted: 2024-01-01

## 2024-01-08 NOTE — ED TRIAGE NOTES
Patient brought in by EMS from home. Patient resides at home with wife. Patient has a history of oxygen dependency and uses 2 L NC at home.    EMS reports patient had a syncopal episode while sitting. BP has been low for EMS. Oxygen per EMS report was low 90s on home oxygen.      Triage Assessment (Adult)       Row Name 01/08/24 1749          Triage Assessment    Airway WDL WDL        Respiratory WDL    Respiratory WDL WDL        Skin Circulation/Temperature WDL    Skin Circulation/Temperature WDL WDL        Cardiac WDL    Cardiac WDL WDL        Peripheral/Neurovascular WDL    Peripheral Neurovascular WDL WDL        Cognitive/Neuro/Behavioral WDL    Cognitive/Neuro/Behavioral WDL X;all     Level of Consciousness somnolent     Arousal Level arouses to repeated stimulation     Orientation time;disoriented to        Astrid Coma Scale    Best Eye Response 3-->(E3) to speech     Best Motor Response 6-->(M6) obeys commands     Best Verbal Response 5-->(V5) oriented     [place] Coma Scale Score 14

## 2024-01-08 NOTE — ED NOTES
Bed: ED12  Expected date:   Expected time:   Means of arrival:   Comments:  433  89 M syncope/hypotension/hypoxia  0847

## 2024-01-09 NOTE — H&P
[facility]    History and Physical - Hospitalist Service       Date of Admission:  1/8/2024    Assessment & Plan        Hermilo Velasquez is a 89 year old male with h/o recurrent C. difficile colitis, adenocarcinoma of the right lung, COPD/emphysema with chronic hypoxic respiratory failure, on 2L of oxygen, h/o DVT/PE, paroxysmal atrial fibrillation, hypertension, CKD stage III, dyslipidemia, BPH, non-Hodgkin lymphoma, GERD, who presented on 1/8/2024 with episode of mechanical fall at home       He was hospitalized previously from 11/25 - 12/1/2023 due to recurrent C. difficile colitis for which he remains on p.o. vancomycin.  At that time he was also found to have small PE for which he was started on apixaban.  He has known history of GI bleed in past.    His last EGD was 8/23/2022 with that showed small hiatal hernia, normal esophagus, normal stomach, normal duodenum.    Last colonoscopies 4/10/2022 and 10/2020 that showed multiple nonbleeding colonic angiodysplastic lesions that were treated with APC, diverticulosis of sigmoid colon and descending colon.  Video capsule study was recommended, unknown if this was pursued or not.    His hemoglobin was 10.4 on 11/30/2023 and is now down to 8.5.  He is Hemoccult positive.      Acute on chronic normocytic anemia, suspect GI bleed  -Hemoglobin 8.5, down from 10.4.  Hemoccult positive.  Was started on apixaban 6 weeks ago due to small PE.  Has known history of GI bleed.  -Most recent EGD and colonoscopies as above.  -Hold apixaban.  Monitor hemoglobin, clear liquid diet for now.  Consult GI  -IV Pepcid.  Has multiple PPIs listed in allergies.  If he is actively bleeding, source most likely to be in colon with previous findings of angioectasias and diverticulosis.    Mechanical fall at home   -Confirmed with patient's wife, he did not have syncope.  He had mechanical fall twice on day of admission.  First episode was right after he woke up in  the morning and start up.  Second episode was when he was sitting in chair and checking his mail, male fell down and he bent to pick it up, lost his balance and fell.  No loss of consciousness  -Head CT showed no acute changes  -Overall very frail with multiple health issues  -Most recent echo was 1/3/2024 and showed normal LV systolic function with an EF of 55-60% RV was not well-visualized but appeared to be dilated with reduced function and prosthetic aortic valve well-visualized.  Overall There was no significant change from before   -Gentle hydration with NS at 50 ml/h  -Monitor on telemetry  -Orthostatic vital signs  -PT/OT evaluation  - consulted.  -TCU has been recommended during previous admissions but patient's wife had always preferred for him to return home     Recurrent C. difficile procto-colitis  -Most recently hospitalized from 11/25/2023 to 12/1/2023 due to recurrent C. difficile proctocolitis.    - ID was consulted and recommended 4 times daily p.o. vancomycin until further evaluation with [facility] for fecal microbiota transplantation.    -Multiple referrals have been placed for this but patient has not yet followed up for this.         Subsegmental left lower lobe pulmonary embolism, 11/25/2023  - Noted on CT PE on 11/25.  Was started on apixaban  -Hold apixaban for now due to concern of GI bleed     Adenocarcinoma of the right lung, diagnosed 2019, s/p wedge resection  Right lower lobe consolidation-concerning for recurrence of malignancy   Bilateral pulmonary nodules  - follows with [place] [facility].  Was diagnosed with stage I adenocarcinoma of right lower lobe in 2019 and is s/p limited thoracotomy with wedge resection in March 2019.    - CT chest on 9/11 - Multiple new pulmonary nodules. 1.8 cm nodule in the RLL WAS concerning for recurrent malignancy. Multiple bilateral new pulmonary nodules were also concerning for metastatic disease   - PET scan 9/22 showed moderate FDG  activity in the left seventh rib anterolaterally, nonspecific and could represent a lymphoma/myeloma vs the sequelae of trauma. Small LLL pulmonary nodules too small to characterize.  FDG activity in the right lower lobe near the wedge resection, most likely inflammation.  Continued CT or PET/CT surveillance recommended. FDG avid soft tissue nodule in the right tongue base may represent a synchronous squamous cell carcinoma.  Plan was to repeat CT chest in 2 months and refer to ENT.  Has not been seen by ENT yet.   - CT PE on 11/25 showed RLL patchy consolidative pulmonary opacities, worrisome for local recurrence vs infection, Multiple pulmonary nodules most prominent in the left lower lobe, slightly increased in size as compared to 09/11/2023, moderate right pleural effusion  -Most recently saw oncology on 12/13/2023.  He was not felt to be a candidate for chemotherapy.  Oncology planning to obtain PET scan and IR consult for biopsy of nodule.  Per wife biopsy was scheduled for 1/9       FDG avid soft tissue nodule in right tongue base - concerning for squamous cell carcinoma  - Heme-onc had made referral to ENT as outpatient.  Continue to follow-up with ENT.  Per patient's wife, this is scheduled for 1/11     COPD with emphysema  Chronic hypoxic respiratory failure on oxygen 2 L  -Apparently saturations were low at home but have been at baseline since his arrival to ER.    -Chest x-ray shows stable central pulmonary vasculature, masslike opacity/consolidation in right lower lobe slightly increased from before, interstitial infiltrates.  -Chest x-ray findings appear chronic.  No fever, no increase in cough, no leukocytosis  -Monitor for now.  He is at increased risk for pneumonias.  Not starting any antibiotics until clear evidence of infection due to recurrent C. difficile  - Continue supplemental oxygen 2 L/min  - Continue home inhaler     Severe aortic stenosis, s/p bioprosthetic aortic valve  replacement  Dyslipidemia  - Continue atorvastatin once verified by pharmacy     Chronic paroxysmal atrial fibrillation  - on apixaban, hold for now due to concern of GI bleed     CKD stage 3  - Baseline creatinine around 1.2-1.4. Creatinine 1.5 on admission, stable      BPH  -Previous CT scan showed enlarged prostate.  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  Had peripheral neuropathy.  Treated with rituximab, completed in 2012  - Most recent labs were stable when last checked by oncology.    - Continue gabapentin     Tobacco use disorder  - Patient continues to smoke at least 3 cigarettes/day  - Smoking cessation is strongly advised     Generalized weakness  - Multifactorial due to acute illness, physical deconditioning  - PT/OT consulted.  TCU recommended.  Patient and wife decided to return to home with outpatient PT        Patient is 89 years old, with multiple health conditions, frail, with recurrent hospitalizations and decline for past several months.  His PCP recently tried to have prior conversation about doing goals of care with patient and his wife.  They were not agreeable to suggestions of comfort focused approach        Diet:  Clear liquid diet  DVT Prophylaxis: DOAC  Suazo Catheter: Not present  Lines: None     Cardiac Monitoring: None  Code Status:  Full code    Clinically Significant Risk Factors Present on Admission              # Hypoalbuminemia: Lowest albumin = 2.9 g/dL at 1/8/2024  5:55 PM, will monitor as appropriate     # Hypertension: Noted on problem list  # Chronic heart failure with preserved ejection fraction: heart failure noted on problem list and last echo with EF >50%  # Acute Respiratory Failure: based on blood gas results.  Continue supplemental oxygen as needed         # COPD: noted on problem list        Disposition Plan      Expected Discharge Date: 01/10/2024                  Glenny  MD Leticia  Hospitalist Service  [facility]  Securely message with Granular (more info)  Text page via [employer] Paging/Directory     ______________________________________________________________________    Chief Complaint   Syncope    History is obtained from the patient and ER provider    History of Present Illness     Hermilo Velasquez is a 89 year old male with h/o recurrent C. difficile colitis, adenocarcinoma of the right lung, COPD/emphysema with chronic hypoxic respiratory failure, on 2L of oxygen, h/o DVT/PE, paroxysmal atrial fibrillation, hypertension, CKD stage III, dyslipidemia, BPH, non-Hodgkin lymphoma, GERD, who presents on 1/8/2024 with mechanical fall at home    Patient himself does not provide any useful information.  He reports he feels fine.      History is obtained from patient's wife over the telephone.  He reports he has been doing okay recently.  This morning he woke up, stood up and then fell down.  She helped him get up and assisted him to the couch.  He felt well after that and was at his baseline.    Later in the day, he was sitting in checking his mail.  Some male fell down.  He bent down to pick it up, lost his balance and fell.  She was not able to help him at that time and had to call a family member.  He was assisted into the chair.  He did not lose consciousness, did not hit his head.    Family member noted that he was hypoxic and thus EMS were called and he was brought to ER for evaluation.    He was hospitalized previously from 11/25 - 12/1/2023 due to recurrent C. difficile colitis for which he remains on p.o. vancomycin.  At that time he was also found to have small PE for which he was started on apixaban.  He has known history of GI bleed in past.    His hemoglobin was 10.4 on 11/30/2023 and is now down to 8.5.  He is Hemoccult positive.    His last EGD was 8/23/2022 with that showed small hiatal hernia, normal esophagus, normal stomach, normal  duodenum.    Last colonoscopies 4/10/2022 and 10/2020 that showed multiple nonbleeding colonic angiodysplastic lesions that were treated with APC, diverticulosis of sigmoid colon and descending colon.  Video capsule study was recommended, unknown if this was pursued or not.        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 Vasques     Aortic stenosis     Severe AS, 9/2015 AVR - [facility] TRIFECTA Bovine bioprosthesis 25MM TF-25A    Atrial fibrillation (H)     9/2015 Paroxysmal post op Afib - discharged on Warfarin and a beta blocker    COPD (chronic obstructive pulmonary disease) (H)     Deep vein thrombosis (H)     GERD (gastroesophageal reflux disease)     Heart murmur     Monoclonal gammopathy     plasmacyte prominent causing monoclonal gammopathy    Need for SBE (subacute bacterial endocarditis) prophylaxis     Neuropathy     Other and unspecified hyperlipidemia     Other malignant lymphomas     non hodgkin's lymphoma    RBBB (right bundle branch block)     Severe sepsis with acute organ dysfunction (H) 11/16/2015    Unspecified hereditary and idiopathic peripheral neuropathy        Past Surgical History   Past Surgical History:   Procedure Laterality Date    APPENDECTOMY      ARTHROPLASTY KNEE Right 7/25/2022    Procedure: RIGHT TOTAL KNEE ARTHROPLASTY;  Surgeon: Giuliano Deshpande MD;  Location:  OR    AS TOTAL KNEE ARTHROPLASTY      BACK SURGERY      ESOPHAGOSCOPY, GASTROSCOPY, DUODENOSCOPY (EGD), COMBINED N/A 11/28/2015    Procedure: COMBINED ESOPHAGOSCOPY, GASTROSCOPY, DUODENOSCOPY (EGD);  Surgeon: Danis Castillo MD;  Location:  GI    ESOPHAGOSCOPY, GASTROSCOPY, DUODENOSCOPY (EGD), COMBINED N/A 7/26/2018    Procedure: COMBINED ESOPHAGOSCOPY, GASTROSCOPY, DUODENOSCOPY (EGD);;  Surgeon: Toby Dong DO;  Location:  GI    ESOPHAGOSCOPY, GASTROSCOPY, DUODENOSCOPY (EGD), COMBINED N/A 8/17/2020     Procedure: ESOPHAGOGASTRODUODENOSCOPY (EGD);  Surgeon: Herrera Henry MD;  Location:  GI    ESOPHAGOSCOPY, GASTROSCOPY, DUODENOSCOPY (EGD), COMBINED N/A 10/24/2020    Procedure: ESOPHAGOGASTRODUODENOSCOPY (EGD);  Surgeon: Vick Florentino MD;  Location:  GI    ESOPHAGOSCOPY, GASTROSCOPY, DUODENOSCOPY (EGD), COMBINED N/A 4/11/2022    Procedure: ESOPHAGOGASTRODUODENOSCOPY (EGD);  Surgeon: Vick Florentino MD;  Location:  GI    ESOPHAGOSCOPY, GASTROSCOPY, DUODENOSCOPY (EGD), COMBINED N/A 8/26/2022    Procedure: ESOPHAGOGASTRODUODENOSCOPY (EGD);  Surgeon: El Mcgovern MD;  Location:  GI    HERNIA REPAIR  2006    HERNIORRHAPHY VENTRAL  4/17/2013    Procedure: HERNIORRHAPHY VENTRAL;  VENTRAL HERNIA REPAIR WITH MESH;  Surgeon: Patel Guzman MD;  Location:  OR    KNEE SURGERY      arthroscopic right knee surgery     LOBECTOMY LUNG Right 3/26/2019    POSSIBLE LOBECTOMY LUNG per Dr[facility]    REPAIR LIGAMENT ANKLE  2/23/2012    Procedure:REPAIR LIGAMENT ANKLE; LEFT TARSAL TUNNEL RELEASE OF KNOT OF ARIEL RELEASE; Surgeon:SAUL PUENTE; Location: OR    REPLACE VALVE AORTIC N/A 9/3/2015    Procedure: REPLACE VALVE AORTIC;  Surgeon: Antonino Mitchell MD;  Location:  OR    ROTATOR CUFF REPAIR RT/LT      bilateral    SPINE SURGERY      3 spine surgeries    THORACOTOMY, WEDGE RESECTION LUNG, COMBINED Right 3/26/2019    RIGHT THORACOTOMY, WEDGE RESECTION, RIGHT LOWER LOBE LUNG NODULE,;  Surgeon: Jose A Vasques MD;  Location:  OR    TONSILLECTOMY      TURP         Prior to Admission Medications   Prior to Admission Medications   Prescriptions Last Dose Informant Patient Reported? Taking?   acetaminophen (TYLENOL) 500 MG tablet  Self Yes No   Sig: Take 1,000 mg by mouth 3 times daily as needed   albuterol (PROAIR HFA/PROVENTIL HFA/VENTOLIN HFA) 108 (90 Base) MCG/ACT inhaler  Self Yes No   Sig: Inhale 2 puffs into the lungs every 6 hours as needed    albuterol (PROVENTIL) (2.5 MG/3ML) 0.083% neb solution  Self No No   Sig: Take 1 vial (2.5 mg) by nebulization every 6 hours as needed for shortness of breath / dyspnea or wheezing   atorvastatin (LIPITOR) 10 MG tablet  Self No No   Sig: Take 1 tablet (10 mg) by mouth daily   famotidine (PEPCID) 40 MG tablet  Self No No   Sig: TAKE ONE TABLET BY MOUTH TWICE DAILY   ferrous sulfate (FE TABS) 325 (65 Fe) MG EC tablet  Self Yes No   Sig: Take 1 tablet (325 mg) by mouth 2 times daily   fludrocortisone (FLORINEF) 0.1 MG tablet  Self No No   Sig: Take 1 tablet (0.1 mg) by mouth daily   fluticasone-vilanterol (BREO ELLIPTA) 200-25 MCG/ACT inhaler  Self Yes No   Sig: Inhale 1 puff into the lungs daily   gabapentin (NEURONTIN) 300 MG capsule  Self No No   Sig: Take 2 capsules (600 mg) by mouth At Bedtime For neuropathy pain   hydrOXYzine HCl (ATARAX) 25 MG tablet   No No   Sig: Take 2 tablets (50 mg) by mouth every 6 hours as needed   predniSONE (DELTASONE) 20 MG tablet   No No   Sig: Take 1 tablet (20 mg) by mouth daily   tamsulosin (FLOMAX) 0.4 MG capsule   No No   Sig: Take 1 capsule (0.4 mg) by mouth daily   tiotropium (SPIRIVA) 18 MCG inhaled capsule  Self Yes No   Sig: Inhale 18 mcg into the lungs daily   vancomycin (VANCOCIN) 125 MG capsule   No No   Sig: Take 1 capsule (125 mg) by mouth 4 times daily      Facility-Administered Medications: None           Physical Exam   Vital Signs: Temp: 97.5  F (36.4  C) Temp src: Temporal BP: 114/62 Pulse: 68   Resp: 15 SpO2: 95 % O2 Device: Nasal cannula Oxygen Delivery: 2 LPM  Weight: 0 lbs 0 oz    Constitutional - resting in bed, appears comfortable  Head - normocephalic, atraumatic  ENT - normal eye lids and lashes, no conjunctival hyperemia, no icterus, extraocular movements are normal, normal nose, no discharge, moist oral mucosa, no ulcers or exudates, normal external ear  Neck - no thyromegaly or lymphadenopathy.  CV - regular rate and rhythm, no murmurs, no  edema  Pulmonary - lungs are clear to auscultation bilaterally, no wheezing or rhonchi  GI - abdomen is soft, non distended, non tender, bowel sounds are present, no organomegaly  Neurological - alert and oriented, normal speech, no focal deficits  Musculoskeletal - no joint erythema or swelling, ROM is ok          Medical Decision Making       75 MINUTES SPENT BY ME on the date of service doing chart review, history, exam, documentation & further activities per the note.      Data     I have personally reviewed the following data over the past 24 hrs:    10.2  \   8.5 (L)   / 142 (L)     139 105 26.1 (H) /  115 (H)   4.5 26 1.50 (H) \     ALT: <5 AST: 7 AP: 90 TBILI: 0.3   ALB: 2.9 (L) TOT PROTEIN: 5.5 (L) LIPASE: N/A     Procal: N/A CRP: N/A Lactic Acid: 0.8         Imaging results reviewed over the past 24 hrs:   Recent Results (from the past 24 hour(s))   XR Chest Port 1 View    Narrative    EXAM: XR CHEST PORT 1 VIEW  LOCATION: [facility]  DATE: 1/8/2024    INDICATION: SOB.  COMPARISON: 11/26/2023 chest x-ray and CT.      Impression    IMPRESSION: Sternotomy. Heart size upper limits of normal and slightly increased since previous examination. Prominence of the central pulmonary vasculature stable. Masslike opacity or consolidation right lower garcia/infrahilar region slightly increased.   Coarse infiltrates scattered in the right lung slightly increased in the right upper lung and decreased in the right lower lung laterally. Interstitial infiltrates left mid lower lung, decreased in midlung and increased in the left lung medially.   Superior subluxation right femoral head chronic rotator cuff arthropathy. Small right pleural effusion stable.   Head CT w/o contrast    Narrative    EXAM: CT HEAD W/O CONTRAST  LOCATION: [facility]  DATE: 1/8/2024    INDICATION: Head injury.  COMPARISON: CT 11/26/2023.  TECHNIQUE: Routine CT Head without IV contrast. Multiplanar  reformats. Dose reduction techniques were used.    FINDINGS:  INTRACRANIAL CONTENTS: No intracranial hemorrhage, extraaxial collection, or mass effect.  No CT evidence of acute infarct. Mild presumed chronic small vessel ischemic changes. Mild to moderate generalized volume loss. No hydrocephalus.     VISUALIZED ORBITS/SINUSES/MASTOIDS: No intraorbital abnormality. Mild mucosal thickening scattered about the paranasal sinuses. No middle ear or mastoid effusion.    BONES/SOFT TISSUES: No acute abnormality.      Impression    IMPRESSION:  1.  No CT evidence for acute intracranial process.  2.  Mild chronic microvascular ischemic changes as above.

## 2024-01-09 NOTE — ED NOTES
"[facility]  ED Nurse Handoff Report    ED Chief complaint: Syncope      ED Diagnosis:   Final diagnoses:   None       Code Status: not addressed by ED MD.    Allergies:   Allergies   Allergen Reactions    Omeprazole Itching    Pantoprazole Itching    Prevacid [Lansoprazole] Itching    Lasix [Furosemide] Rash    Lidocaine Blisters and Rash     Allergy to lidocaine ointment  Allergy to lidocaine ointment      Penicillin G Rash    Penicillins Rash     \"broke out from injection\" 60 yrs ago  Tolerates cephalosporins       Patient Story: Patient brought in by EMS from home.Patient has a history of A-fib, aortic stenosis, COPD, oxygen dependency, neuropathy, and non hodgkin's lymphoma. Patient comes in after having a syncopal episode while seated at home. Patient had low oxygen sats while on his home oxygen.  Focused Assessment:  Patient in with EMS, somnolent of arrival. MD performed a rectal exam on patient and it tested positive for occult blood. Patient is on 2 L NC and oxygen sats are 96-98 on 2 L NC. BP low upon arrival to ED. Patient is hard of hearing.    Treatments and/or interventions provided: IV fluids  Patient's response to treatments and/or interventions: Patient is stable    To be done/followed up on inpatient unit:  continue to monitor    Does this patient have any cognitive concerns?: Disoriented to time    Activity level - Baseline/Home:  Independent  Activity Level - Current:   Unknown    Patient's Preferred language: English   Needed?: No    Isolation: None  Infection: Not Applicable  Patient tested for COVID 19 prior to admission: NO  Bariatric?: No    Vital Signs:   Vitals:    01/08/24 1748   BP: 95/46   Pulse: 63   Resp: 14   Temp: 97.5  F (36.4  C)   TempSrc: Temporal   SpO2: 96%       Cardiac Rhythm:     Was the PSS-3 completed:   Yes  What interventions are required if any?               Family Comments: not in attendance  OBS brochure/video discussed/provided to " patient/family: No              Name of person given brochure if not patient: n/a              Relationship to patient: n/a    For the majority of the shift this patient's behavior was Green.   Behavioral interventions performed were none.    ED NURSE PHONE NUMBER: [phone]

## 2024-01-09 NOTE — PHARMACY-ADMISSION MEDICATION HISTORY
"Pharmacist Admission Medication History    Admission medication history is complete. The information provided in this note is only as accurate as the sources available at the time of the update.    Information Source(s): Family member via Wife Jesse    Pertinent Information: Pts wife states pt completed PO Vancomycin RX a couple of weeks ago - Appears this was 30 day RX ordered at discharge on 12/1/23  Appears Duloxetine was removed from PTA med list on PTA med interview last admission - [facility] to [facility]24 - Pts wife is unsure if he is taking but does state he [facility]    Changes made to PTA medication list:  Added: Duloxetine 60 mg daily   Deleted: None  Changed: Changed PO Vancomycin to \"Not taking\" as pt wife states this RX is complete.  Pts wife does not think he is using the Breo Ellipta inhaler - I do not see a fill history for this. Previous medication histories stat pt not taking due to cost.  - this was changed to \"not taking\" also       Allergies reviewed with patient and updates made in EHR:  Done by RN    Medication History Completed By: Teri Contreras [facility] 1/9/2024 9:23 AM    PTA Med List   Medication Sig Last Dose    acetaminophen (TYLENOL) 500 MG tablet Take 1,000 mg by mouth 3 times daily as needed PRN    albuterol (PROAIR HFA/PROVENTIL HFA/VENTOLIN HFA) 108 (90 Base) MCG/ACT inhaler Inhale 2 puffs into the lungs every 6 hours as needed PRN    atorvastatin (LIPITOR) 10 MG tablet Take 1 tablet (10 mg) by mouth daily     DULoxetine (CYMBALTA) 60 MG capsule Take 60 mg by mouth daily     famotidine (PEPCID) 40 MG tablet TAKE ONE TABLET BY MOUTH TWICE DAILY     ferrous sulfate (FE TABS) 325 (65 Fe) MG EC tablet Take 1 tablet (325 mg) by mouth 2 times daily     fludrocortisone (FLORINEF) 0.1 MG tablet Take 1 tablet (0.1 mg) by mouth daily     gabapentin (NEURONTIN) 300 MG capsule Take 2 capsules (600 " mg) by mouth At Bedtime For neuropathy pain     hydrOXYzine HCl (ATARAX) 25 MG tablet Take 2 tablets (50 mg) by mouth every 6 hours as needed PRN    tamsulosin (FLOMAX) 0.4 MG capsule Take 1 capsule (0.4 mg) by mouth daily     tiotropium (SPIRIVA) 18 MCG inhaled capsule Inhale 18 mcg into the lungs daily

## 2024-01-09 NOTE — CONSULTS
Care Management Initial Consult    General Information  Assessment completed with: Spouse or significant other, Roberta  Type of CM/SW Visit: Initial Assessment    Primary Care Provider verified and updated as needed: Yes   Readmission within the last 30 days: unable to assess      Reason for Consult: other (see comments) (Elevated risk of readmission)  Advance Care Planning:            Communication Assessment  Patient's communication style: spoken language (English or Bilingual)    Hearing Difficulty or Deaf: yes   Wear Glasses or Blind: yes    Cognitive  Cognitive/Neuro/Behavioral: .WDL except  Level of Consciousness: confused  Arousal Level: arouses to voice  Orientation: disoriented to, time, situation  Mood/Behavior: calm  Best Language: 0 - No aphasia  Speech: clear    Living Environment:   People in home: spouse  Roberta  Current living Arrangements: house      Able to return to prior arrangements: other (see comments) (Pending PT)       Family/Social Support:  Care provided by: self, spouse/significant other  Provides care for: no one  Marital Status:   Wife, Children  Roberta       Description of Support System: Supportive, Involved         Current Resources:   Patient receiving home care services: No     Community Resources: None  Equipment currently used at home: walker, rolling, cane, straight (Pt reports using FWW and cane at baseline. Unable to determine accuracy)  Supplies currently used at home: Oxygen Tubing/Supplies    Employment/Financial:  Employment Status: retired        Financial Concerns: none   Referral to Financial Worker: No       Does the patient's insurance plan have a 3 day qualifying hospital stay waiver?  No    Lifestyle & Psychosocial Needs:  Social Determinants of Health     Food Insecurity: No Food Insecurity (5/12/2020)    Hunger Vital Sign     Worried About Running Out of Food in the Last Year: Never true     Ran Out of Food in the Last Year: Never true   Depression: Not at risk  (10/26/2023)    PHQ-2     PHQ-2 Score: 0   Housing Stability: Not on file   Tobacco Use: Medium Risk (12/7/2023)    Patient History     Smoking Tobacco Use: Former     Smokeless Tobacco Use: Never     Passive Exposure: Not on file   Financial Resource Strain: Low Risk  (5/12/2020)    Overall Financial Resource Strain (CARDIA)     Difficulty of Paying Living Expenses: Not hard at all   Alcohol Use: Not on file   Transportation Needs: No Transportation Needs (5/12/2020)    PRAPARE - Transportation     Lack of Transportation (Medical): No     Lack of Transportation (Non-Medical): No   Physical Activity: Inactive (5/12/2020)    Exercise Vital Sign     Days of Exercise per Week: 0 days     Minutes of Exercise per Session: 0 min   Interpersonal Safety: Not At Risk (5/12/2020)    Humiliation, Afraid, Rape, and Kick questionnaire     Fear of Current or Ex-Partner: No     Emotionally Abused: No     Physically Abused: No     Sexually Abused: No   Stress: No Stress Concern Present (5/12/2020)    [place] [place] [employer] - Occupational Stress Questionnaire     Feeling of Stress : Not at all   Social Connections: Moderately Isolated (5/12/2020)    Social Connection and Isolation Panel [NHANES]     Frequency of Communication with Friends and Family: Twice a week     Frequency of Social Gatherings with Friends and Family: Twice a week     Attends Zoroastrianism Services: Never     Active Member of Clubs or Organizations: No     Attends Club or Organization Meetings: Never     Marital Status:        Functional Status:  Prior to admission patient needed assistance:   Dependent ADLs:: Independent, Ambulation-cane  Dependent IADLs:: Cleaning, Cooking, Laundry, Shopping, Meal Preparation, Transportation       Mental Health Status:          Chemical Dependency Status:                Values/Beliefs:  Spiritual, Cultural Beliefs, Zoroastrianism Practices, Values that affect care: yes ([place])               Additional  Information:  Per consult for elevated risk of readmission and chart review indicating patient with decreased cognition, called patient's spouse Roberta.  Per Roberta, pt is independent with his ADLs, is able to set up his own medications and ambulates with cane.  Roberta shared that she does the cooking, cleaning, laundry and driving.  Discussed PT/OT eval and possible need for TCU.  Per Roberta, pt will not want to go to TCU and will want to come home.  Discussed home care and Roberta shared patient has had this in the past and would be open to home care again, although she shared that they didn't do much when he had it before.  Discussed PCP follow-up will be scheduled prior to discharge and Roberta shared that pt has an appointment with a ENT Provider on January 11th.  Scheduled PCP-Jan 24, 2024  9:00 AM  (Arrive by 8:45 AM)  ED/Hospital Follow Up with Karson Bishop MD  [facility] ([facility] )     Inpatient Care Coordinator will continue to follow for discharge planning.  CECILIA Ardon RN, BSN, OCN   Inpatient Care Coordination [address]  Office: [phone]

## 2024-01-09 NOTE — TELEPHONE ENCOUNTER
TO PCP  FYI    Patients wife calling to make sure someone would order 02 for him when he gets out of the hospital.     Writer advised the hospital will do that. Patients wife verbalized understanding.

## 2024-01-09 NOTE — PROGRESS NOTES
01/09/24 1320   Appointment Info   Signing Clinician's Name / Credentials (OT) Damian Lauren OTR/L   Living Environment   People in Home spouse   Current Living Arrangements house   Home Accessibility stairs to enter home;stairs within home   Number of Stairs, Main Entrance 2   Stair Railings, Main Entrance railings safe and in good condition   Transportation Anticipated family or friend will provide   Living Environment Comments Pt a poor historian, home setup taken from last admission.   Self-Care   Usual Activity Tolerance moderate   Current Activity Tolerance fair   Equipment Currently Used at Home walker, rolling;cane, straight  (Pt reports using FWW and cane at baseline. Unable to determine accuracy)   Fall history within last six months yes   Number of times patient has fallen within last six months 2   Activity/Exercise/Self-Care Comment Pt reports being IND w/ all ADL's at baseline. Unable to determine due to pt being poor historian.   Instrumental Activities of Daily Living (IADL)   IADL Comments Unable to assess   General Information   Onset of Illness/Injury or Date of Surgery 01/08/24   Referring Physician Glenny Kelly MD   Patient/Family Therapy Goal Statement (OT) None stated   Additional Occupational Profile Info/Pertinent History of Current Problem Hermilo Velasquez is a 89 year old male with h/o recurrent C. difficile colitis, adenocarcinoma of the right lung, COPD/emphysema with chronic hypoxic respiratory failure, on 2L of oxygen, h/o DVT/PE, paroxysmal atrial fibrillation, hypertension, CKD stage III, dyslipidemia, BPH, non-Hodgkin lymphoma, GERD, who presented on 1/8/2024 with episode of mechanical fall at home   Existing Precautions/Restrictions fall;oxygen therapy device and L/min  (2 L)   Cognitive Status Examination   Orientation Status person;place  (Not oriented to day of week)   Follows Commands follows one-step commands;75-90% accuracy;delayed response/completion;increased  processing time needed   Visual Perception   Visual Impairment/Limitations corrective lenses for reading   Sensory   Sensory Comments 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   Pain Assessment   Patient Currently in Pain No   Range of Motion Comprehensive   General Range of Motion no range of motion deficits identified   Strength Comprehensive (MMT)   General Manual Muscle Testing (MMT) Assessment no strength deficits identified   Bed Mobility   Comment (Bed Mobility) Pt up in chair on arrival   Transfers   Transfers sit-stand transfer;toilet transfer   Sit-Stand Transfer   Sit-Stand [place] (Transfers) contact guard   Assistive Device (Sit-Stand Transfers) walker, front-wheeled   Toilet Transfer   Type (Toilet Transfer) stand-sit;sit-stand   [place] Level (Toilet Transfer) minimum assist (75% patient effort)   Assistive Device (Toilet Transfer) walker, front-wheeled   Activities of Daily Living   BADL Assessment/Intervention lower body dressing;grooming;toileting   Lower Body Dressing Assessment/Training   [place] Level (Lower Body Dressing) minimum assist (75% patient effort)   Grooming Assessment/Training   Comment, (Grooming) SBA   Toileting   [place] Level (Toileting) minimum assist (75% patient effort)   Clinical Impression   Criteria for Skilled Therapeutic Interventions Met (OT) Yes, treatment indicated   OT Diagnosis Decreased ADL independence and tolerance   Influenced by the following impairments Decreased ADL independence and cognition   OT Problem List-Impairments impacting ADL problems related to;activity tolerance impaired;cognition   Assessment of Occupational Performance 3-5 Performance Deficits   Identified Performance Deficits LB dressing, toilet transfer, toileting, g/h sinkside   Planned Therapy Interventions (OT) ADL retraining;cognition;home program guidelines;progressive activity/exercise;risk factor education   Clinical Decision Making Complexity (OT) problem focused  "assessment/low complexity   Risk & Benefits of therapy have been explained evaluation/treatment results reviewed;care plan/treatment goals reviewed;risks/benefits reviewed;current/potential barriers reviewed;patient   OT Total Evaluation Time   OT Eval, Low Complexity Minutes (55016) 10   OT Goals   Therapy Frequency (OT) 5 times/week   OT Predicted Duration/Target Date for Goal Attainment 01/14/24   OT Goals Hygiene/Grooming;Lower Body Dressing;Toilet Transfer/Toileting;Cognition   OT: Hygiene/Grooming modified independent;while standing   OT: Lower Body Dressing Modified independent;using adaptive equipment;including set-up/clothing retrieval   OT: Toilet Transfer/Toileting Modified independent;toilet transfer;cleaning and garment management   OT: Cognitive Patient/caregiver will verbalize understanding of cognitive assessment results/recommendations as needed for safe discharge planning   Self-Care/Home Management   Self-Care/Home Mgmt/ADL, Compensatory, Meal Prep Minutes (25516) 23   Symptoms Noted During/After Treatment (Meal Preparation/Planning Training) fatigue   Treatment Detail/Skilled Intervention Pt greeted sitting up in bedside chair and agreeable for OT evaluation. Pt asking writer, \"Is this her room?\" Reoriented pt that they are in their room in the hospital. Pt on 2 L of O2 via NC throughout therapy session. Pt tasked w/ doffing/donning B socks; pt able to reach down and doff L sock but unable to don; Min A from writer given. Pt requesting to use bathroom. Time spent for room setup. CGA sit > stand edge of chair w/ use of FWW. Pt ambulated within room to bathroom w/ assist for line management. Pt attempting to void while standing w/ Min A for balance. Pt unable to complete and requesting to sit; toilet transfer completed w/ Min A and use of grab bar on R side. Pt able to void and complete garcia cares while seated. Upon completion, pt returned to room and seated in bedside chair. Wash cloth provided " for hygiene. Pt questioning on where they were currently; reorientation provided. Pt remained seated in chair at end of therapy session w/ all needs met and chair alarm activated.   OT Discharge Planning   OT Plan LB dressing w/ AE, g/h sinkside, toilet transfer, monitor cognition   OT Discharge Recommendation (DC Rec) Transitional Care Facility   OT Rationale for DC Rec Pt is currently functioning below baseline d/t decreased activity tolerance and cognition. Per chart, pt lives in house w/ spouse. Unable to determine level of assist. Anticipate pt will benefit from continued therapy at TCU setting following discharge. Due to frequent hospitilizations, pt may benefit from a more supportive living environment such as [facility].   OT Brief overview of current status See above   Total Session Time   Timed Code Treatment Minutes 23   Total Session Time (sum of timed and untimed services) 33

## 2024-01-09 NOTE — PROGRESS NOTES
[facility]    Hospitalist Progress Note    Brief Summary:  Hermilo Velasquez is a 89 year old male with h/o recurrent C. difficile colitis, adenocarcinoma of the right lung, COPD/emphysema with chronic hypoxic respiratory failure, on 2L of oxygen, h/o DVT/PE, paroxysmal atrial fibrillation, hypertension, CKD stage III, dyslipidemia, BPH, non-Hodgkin lymphoma, GERD, who presented on 1/8/2024 with episode of mechanical fall at home      He was hospitalized previously from 11/25 - 12/1/2023 due to recurrent C. difficile colitis for which he remains on p.o. vancomycin.  At that time he was also found to have small PE for which he was started on apixaban.  He has known history of GI bleed in past.   His last EGD was 8/23/2022 with that showed small hiatal hernia, normal esophagus, normal stomach, normal duodenum.   Last colonoscopies 4/10/2022 and 10/2020 that showed multiple nonbleeding colonic angiodysplastic lesions that were treated with APC, diverticulosis of sigmoid colon and descending colon.  Video capsule study was recommended, unknown if this was pursued or not.     His hemoglobin was 10.4 on 11/30/2023 and is now down to 8.5 with Hemoccult positive.      Assessment & Plan        Acute on chronic normocytic anemia, suspect GI bleed  -Hemoglobin 8.5, down from 10.4.  Hemoccult positive.  Was started on apixaban 6 weeks ago due to small PE.  Has known history of GI bleed, previously anticoagulation was discontinued because of having GI bleed at the time.  -Most recent EGD and colonoscopies reviewed, most likely has AVM and bleeding from there.  Continue with IV Pepcid, as the patient is allergic to PPI.  No active bleeding at this time.  I will continue hold apixaban at this time.    We may need to discuss with the patient and the family about the risk and benefit of continued anticoagulation.  In my opinion for this subsegmental PE, we can hold off onto the anticoagulation at this  time.  Will discuss this with the family.    -IV Pepcid.  Has multiple PPIs listed in allergies.  If he is actively bleeding, source most likely to be in colon with previous findings of angioectasias and diverticulosis.  Hemoglobin has been stable at this time, improved to 9.0 today     Mechanical fall at home   -Confirmed with patient's wife, he did not have syncope.  He had mechanical fall twice on day of admission.  First episode was right after he woke up in the morning and start up.  Second episode was when he was sitting in chair and checking his mail, male fell down and he bent to pick it up, lost his balance and fell.  No loss of consciousness  -Head CT showed no acute changes  -Overall very frail with multiple health issues  -Most recent echo was 1/3/2024 and showed normal LV systolic function with an EF of 55-60% RV was not well-visualized but appeared to be dilated with reduced function and prosthetic aortic valve well-visualized.  Overall There was no significant change from before   -Gentle hydration with NS at 50 ml/h  -Monitor on telemetry  -Orthostatic vital signs  -PT/OT evaluation  - consulted.  -TCU has been recommended during previous admissions but patient's wife had always preferred for him to return home  At this time we will have PT and OT evaluate him and see how he does.     Recurrent C. difficile procto-colitis  -Most recently hospitalized from 11/25/2023 to 12/1/2023 due to recurrent C. difficile proctocolitis.    - ID was consulted and recommended 4 times daily p.o. vancomycin until further evaluation with [facility] for fecal microbiota transplantation.    -Multiple referrals have been placed for this but patient has not yet followed up for this.    Stable at this time, not acute diarrhea.        Subsegmental left lower lobe pulmonary embolism, 11/25/2023  - Noted on CT PE on 11/25.  Was started on apixaban, CT scan reviewed, few scattered small filling defects in the  subsegmental pulmonary arteries more prominent in the left lower lobe, the radiologist think that represent small pulm emboli  -Patient is a high risk for GI bleeding, and he bleeds again, I think we can hold off on anticoagulation in his case.  Will discuss this more with the family.     Adenocarcinoma of the right lung, diagnosed 2019, s/p wedge resection  Right lower lobe consolidation-concerning for recurrence of malignancy   Bilateral pulmonary nodules  - follows with [place] [facility].  Was diagnosed with stage I adenocarcinoma of right lower lobe in 2019 and is s/p limited thoracotomy with wedge resection in March 2019.    - CT chest on 9/11 - Multiple new pulmonary nodules. 1.8 cm nodule in the RLL WAS concerning for recurrent malignancy. Multiple bilateral new pulmonary nodules were also concerning for metastatic disease   - PET scan 9/22 showed moderate FDG activity in the left seventh rib anterolaterally, nonspecific and could represent a lymphoma/myeloma vs the sequelae of trauma. Small LLL pulmonary nodules too small to characterize.  FDG activity in the right lower lobe near the wedge resection, most likely inflammation.  Continued CT or PET/CT surveillance recommended. FDG avid soft tissue nodule in the right tongue base may represent a synchronous squamous cell carcinoma.  Plan was to repeat CT chest in 2 months and refer to ENT.  Has not been seen by ENT yet.   - CT PE on 11/25 showed RLL patchy consolidative pulmonary opacities, worrisome for local recurrence vs infection, Multiple pulmonary nodules most prominent in the left lower lobe, slightly increased in size as compared to 09/11/2023, moderate right pleural effusion  -Most recently saw oncology on 12/13/2023.  He was not felt to be a candidate for chemotherapy.  Oncology planning to obtain PET scan and IR consult for biopsy of nodule.  Per wife biopsy was scheduled for 1/9        FDG avid soft tissue nodule in right tongue base - concerning  for squamous cell carcinoma  - Heme-onc had made referral to ENT as outpatient.  Continue to follow-up with ENT.  Per patient's wife, this is scheduled for 1/11     COPD with emphysema  Chronic hypoxic respiratory failure on oxygen 2 L  -Apparently saturations were low at home but have been at baseline since his arrival to ER.    -Chest x-ray shows stable central pulmonary vasculature, masslike opacity/consolidation in right lower lobe slightly increased from before, interstitial infiltrates.  -Chest x-ray findings appear chronic.  No fever, no increase in cough, no leukocytosis  -Monitor for now.  He is at increased risk for pneumonias.  Not starting any antibiotics until clear evidence of infection due to recurrent C. difficile  - Continue supplemental oxygen 2 L/min  - Continue home inhaler     Severe aortic stenosis, s/p bioprosthetic aortic valve replacement  Dyslipidemia  - Continue atorvastatin once verified by pharmacy     Chronic paroxysmal atrial fibrillation  - on apixaban, hold for now due to concern of GI bleed     CKD stage 3  - Baseline creatinine around 1.2-1.4. Creatinine 1.5 on admission, stable      BPH  -Previous CT scan showed enlarged prostate.  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  Had peripheral neuropathy.  Treated with rituximab, completed in 2012  - Most recent labs were stable when last checked by oncology.    - Continue gabapentin     Tobacco use disorder  - Patient continues to smoke at least 3 cigarettes/day  - Smoking cessation is strongly advised     Generalized weakness  - Multifactorial due to acute illness, physical deconditioning  - PT/OT consulted.  TCU recommended.  Patient and wife decided to return to home with outpatient PT           DVT Prophylaxis: Pneumatic Compression Devices  Code Status: Full Code    Disposition: Expected discharge in 1 to 2 days once stable    Aniket CURIEL  MD Dennise, MD  Text Page  (7am - 6pm)    Interval History   Patient seen and evaluated in his room today, awake alert, in no acute distress.  Denies any fever chills chest pain headache dizziness lightheadedness.  Patient has baseline cognitive impairment not a very good historian    No other significant event overnight    -Data reviewed today: I reviewed all new labs and imaging results over the last 24 hours. I personally reviewed the chest x-ray image(s)  and the head CT image(s) reviewed and agree with the report below..    Physical Exam   Temp: 97.6  F (36.4  C) Temp src: Oral BP: 134/65 Pulse: 81   Resp: 18 SpO2: 96 % O2 Device: None (Room air) Oxygen Delivery: 2 LPM  Vitals:    01/09/24 0000   Weight: 62.1 kg (136 lb 14.5 oz)     Vital Signs with Ranges  Temp:  [97.5  F (36.4  C)-97.9  F (36.6  C)] 97.6  F (36.4  C)  Pulse:  [63-84] 81  Resp:  [14-18] 18  BP: ()/(46-65) 134/65  SpO2:  [90 %-97 %] 96 %  I/O last 3 completed shifts:  In: -   Out: 200 [Urine:200]    Constitutional: awake, alert, cooperative, no apparent distress, and appears stated age  Eyes: Lids and lashes normal, pupils equal, round and reactive to light, extra ocular muscles intact, sclera clear, conjunctiva normal  Respiratory: No increased work of breathing, good air exchange, clear to auscultation bilaterally, no crackles or wheezing  Cardiovascular: Normal apical impulse, regular rate and rhythm, normal S1 and S2, no S3 or S4, and no murmur noted  GI: No scars, normal bowel sounds, soft, non-distended, non-tender, no masses palpated, no hepatosplenomegally  Musculoskeletal: no lower extremity pitting edema present  Neurologic: No focal deficit    Medications    sodium chloride 50 mL/hr at 01/09/24 0017      famotidine  20 mg Intravenous Q12H    fludrocortisone  0.1 mg Oral Daily    fluticasone-vilanterol  1 puff Inhalation Daily    sodium chloride (PF)  3 mL Intracatheter Q8H    tamsulosin  0.4 mg Oral Daily    umeclidinium  1 puff  Inhalation Daily    vancomycin  125 mg Oral 4x Daily       Data   Recent Labs   Lab 01/09/24  0637 01/09/24  0007 01/08/24 1952 01/08/24  1755   WBC 6.7  --   --  10.2   HGB 9.0* 8.7* 8.5* 8.7*   MCV 84  --   --  84   *  --   --  142*   INR  --   --   --  1.53*     --   --  139   POTASSIUM 4.7  --   --  4.5   CHLORIDE 107  --   --  105   CO2 28  --   --  26   BUN 23.8*  --   --  26.1*   CR 1.25*  --   --  1.50*   ANIONGAP 5*  --   --  8   AMRITA 8.3  --   --  8.4   *  --   --  115*   ALBUMIN  --   --   --  2.9*   PROTTOTAL  --   --   --  5.5*   BILITOTAL  --   --   --  0.3   ALKPHOS  --   --   --  90   ALT  --   --   --  <5   AST  --   --   --  7       Recent Results (from the past 24 hour(s))   XR Chest Port 1 View    Narrative    EXAM: XR CHEST PORT 1 VIEW  LOCATION: [facility]  DATE: 1/8/2024    INDICATION: SOB.  COMPARISON: 11/26/2023 chest x-ray and CT.      Impression    IMPRESSION: Sternotomy. Heart size upper limits of normal and slightly increased since previous examination. Prominence of the central pulmonary vasculature stable. Masslike opacity or consolidation right lower garcia/infrahilar region slightly increased.   Coarse infiltrates scattered in the right lung slightly increased in the right upper lung and decreased in the right lower lung laterally. Interstitial infiltrates left mid lower lung, decreased in midlung and increased in the left lung medially.   Superior subluxation right femoral head chronic rotator cuff arthropathy. Small right pleural effusion stable.   Head CT w/o contrast    Narrative    EXAM: CT HEAD W/O CONTRAST  LOCATION: [facility]  DATE: 1/8/2024    INDICATION: Head injury.  COMPARISON: CT 11/26/2023.  TECHNIQUE: Routine CT Head without IV contrast. Multiplanar reformats. Dose reduction techniques were used.    FINDINGS:  INTRACRANIAL CONTENTS: No intracranial hemorrhage, extraaxial collection, or mass effect.   No CT evidence of acute infarct. Mild presumed chronic small vessel ischemic changes. Mild to moderate generalized volume loss. No hydrocephalus.     VISUALIZED ORBITS/SINUSES/MASTOIDS: No intraorbital abnormality. Mild mucosal thickening scattered about the paranasal sinuses. No middle ear or mastoid effusion.    BONES/SOFT TISSUES: No acute abnormality.      Impression    IMPRESSION:  1.  No CT evidence for acute intracranial process.  2.  Mild chronic microvascular ischemic changes as above.

## 2024-01-09 NOTE — PROGRESS NOTES
01/09/24 1605   Appointment Info   Signing Clinician's Name / Credentials (PT) Zain Ricardo DPT   Living Environment   People in Home spouse   Current Living Arrangements house   Home Accessibility stairs to enter home;stairs within home   Number of Stairs, Main Entrance 2   Stair Railings, Main Entrance railings safe and in good condition   Transportation Anticipated family or friend will provide   Living Environment Comments Pt a poor historian, home setup taken from last admission.   Self-Care   Usual Activity Tolerance moderate   Current Activity Tolerance fair   Equipment Currently Used at Home walker, rolling;cane, straight  (Pt reports using FWW and cane at baseline. Unable to determine accuracy)   Fall history within last six months yes   Number of times patient has fallen within last six months 2   Activity/Exercise/Self-Care Comment Pt reports being IND w/ all ADL's at baseline. Unable to determine due to pt being poor historian.   General Information   Onset of Illness/Injury or Date of Surgery 01/08/24   Referring Physician Glenny Kelly MD   Patient/Family Therapy Goals Statement (PT) Return to home   Pertinent History of Current Problem (include personal factors and/or comorbidities that impact the POC) Hermilo Velasquez is a 89 year old male with h/o recurrent C. difficile colitis, adenocarcinoma of the right lung, COPD/emphysema with chronic hypoxic respiratory failure, on 2L of oxygen, h/o DVT/PE, paroxysmal atrial fibrillation, hypertension, CKD stage III, dyslipidemia, BPH, non-Hodgkin lymphoma, GERD, who presented on 1/8/2024 with episode of mechanical fall at home   Existing Precautions/Restrictions fall   Cognition   Affect/Mental Status (Cognition) confused   Orientation Status (Cognition) oriented to;person;situation   Follows Commands (Cognition) follows one-step commands;repetition of directions required;verbal cues/prompting required   Pain Assessment   Patient Currently in Pain No    Range of Motion (ROM)   ROM Comment WFLs for mobility and transfers no formal testing completed   Strength (Manual Muscle Testing)   Strength Comments Generalized weakness and mobility no formal testing completed   Bed Mobility   Comment, (Bed Mobility) Supine to sitting EOB w/ SBA   Transfers   Comment, (Transfers) Sit to stand w/ FWW and CGA   Gait/Stairs (Locomotion)   Comment, (Gait/Stairs) 10 ft w/ FWW and CGA   Balance   Balance Comments No overt LOB noted   Clinical Impression   Criteria for Skilled Therapeutic Intervention Yes, treatment indicated   PT Diagnosis (PT) Impaired ambulation   Influenced by the following impairments Impaired strength, balance and activity tolerance   Functional limitations due to impairments Impaired ADLs, IADLs and functional mobility   Clinical Presentation (PT Evaluation Complexity) stable   Clinical Presentation Rationale Clinical judgment   Clinical Decision Making (Complexity) low complexity   Planned Therapy Interventions (PT) balance training;bed mobility training;gait training;home exercise program;motor coordination training;neuromuscular re-education;patient/family education;postural re-education;ROM (range of motion);stair training;stretching;strengthening;transfer training;progressive activity/exercise;risk factor education;home program guidelines   Risk & Benefits of therapy have been explained evaluation/treatment results reviewed;care plan/treatment goals reviewed;risks/benefits reviewed;current/potential barriers reviewed;participants voiced agreement with care plan;participants included;patient   PT Total Evaluation Time   PT Eval, Low Complexity Minutes (25930) 10   Physical Therapy Goals   PT Frequency 5x/week   PT Predicted Duration/Target Date for Goal Attainment 01/19/24   PT Goals Bed Mobility;Transfers;Gait;Stairs   PT: Bed Mobility Independent;Supine to/from sit   PT: Transfers Modified independent;Sit to/from stand;Assistive device   PT: Gait Modified  independent;150 feet;Rolling walker   PT: Stairs Supervision/stand-by assist;2 stairs;Rail on both sides   Interventions   Interventions Quick Adds Gait Training;Therapeutic Activity   Therapeutic Activity   Therapeutic Activities: dynamic activities to improve functional performance Minutes (08663) 10   Symptoms Noted During/After Treatment None   Treatment Detail/Skilled Intervention Pt supine in bed at start of session. Satting in the mid 90s on 2 L of O2 via NC. Once seated EOB Pt w/ good sitting balance. Noting some mild lightheadedness. Seated BP taken at 129/55. Standing BP taken at 93/50. Pt consistently pushing w/ UEs from FWW. Frequently losing balance backwards on standing. Pt completing sit to stand x3 w/ FWW and CGA. Completing x10 sit to stands in a row w/ FWW and CGA. Good tolerance and ashok noted. Able to continue sequencing w/ one hand on bed when prompted. After last ambulation seated BP at 99/55. Sit to supine w/ SBA. Needing A x1 to boost higher in bed. All needs met w/ call light in place and bed alarm on.   Gait Training   Gait Training Minutes (19645) 14   Symptoms Noted During/After Treatment (Gait Training) none   Treatment Detail/Skilled Intervention Pt ambulating ~20 ft x1, 60 ft x1 and 80 ft x1 w/ FWW and CGA. No overt LOB noted. Kyphotic posture throughout w/ FWW far in front of walker. Not able to sustain when cued to keep closer to body. On turning Pt w/ LEs outside of FWW and needing cued to keep close to body to improve balance.   PT Discharge Planning   PT Plan Amb distance, activity tolerance, check BPs, strengthening, walker safety   PT Discharge Recommendation (DC Rec) Transitional Care Facility   PT Rationale for DC Rec Pt below baseline mobility. Lives in a house w/ wife. Pt w/ frequent falls noted and now positive orthostatics. Currently A x1 w/ FWW. Anticipate when medically ready Pt can DC to TCU to improve upon functional mobility and strengthening.   PT Brief overview  of current status CGA w/ FWW   Total Session Time   Timed Code Treatment Minutes 24   Total Session Time (sum of timed and untimed services) 34

## 2024-01-09 NOTE — PLAN OF CARE
Goal Outcome Evaluation:    Summary: h/o recurrent C. difficile colitis, adenocarcinoma of the right lung, COPD/emphysema with chronic hypoxic respiratory failure, on 2L of oxygen, h/o DVT/PE, paroxysmal atrial fibrillation, hypertension, CKD stage III, dyslipidemia, BPH, non-Hodgkin lymphoma, GERD.    Primary Diagnosis: Recurrent falls, Anemia, and Upper GI bleed.   Orientation: A&O x2. Disoriented to time and situation. Confused and hard of hearing. Does not have hearing aid. Pt stated he has glasses but did not have any.   Aggression Stop Light: Green  Mobility: Ax2 w/ GB+walker. Not oob this shift.   Pain Management: Denies pain, nausea, and SOB.   Diet: Clear liquid  Bowel/Bladder: Continent of urine. Uses bedside urinal. Pt unable to recall last BM.   Abnormal Lab/Assessments: VSS on 4L O2 nasal cannula. Attempted to wean down and spO2 dropped to 84-90%.   Drain/Device/Wound: R PIV infusing NS at 50mL/hr.   Consults: GI, SW  D/C Day/Goals/Place: Hgb 9, INR 1.53, UA results available. Pt on telemetry irregular A-fib with some PVCs.     Shift Note: Occult positive, Hgb lab draw Q6hrs.

## 2024-01-09 NOTE — CONSULTS
[facility]  Gastroenterology Consultation         Hermilo Velasquez  [address] [facility] [address]  89 year old male    Admission Date/Time: 1/8/2024  Primary Care Provider: Karson Bishop  Referring / Attending Physician:  Dr. Glenny Kelly    We were asked to see the patient in consultation by Dr. Glenny Kelly for evaluation of suspect GI bleed.      CC: fall and lightheadedness    HPI:  Hermilo Velasquez is a 89 year old male who has a past medical history of chronic respiratory failure due to COPD, aortic stenosis s/p AVR and bioprosthetic valve replacement, chronic paroxysmal atrial fibrillation and recurrent PE as well as DVT on Apixaban, prior lung cancer, and prior Non-Hodgkins Lymphoma, Gi bleed among multiple others; who presents with a fall at home.     He was hospitalized previously from 11/25 - 12/1/2023 due to recurrent C. difficile colitis for which he remains on p.o. vancomycin.  At that time he was also found to have small PE for which he was started on apixaban.      Last colonoscopies 4/10/2022 and 10/2020 that showed multiple nonbleeding colonic angiodysplastic lesions that were treated with APC, diverticulosis of sigmoid colon and descending colon.  Video capsule study was recommended and not performed. 08/2022 EGD noted no abnormal findings     His hemoglobin was 10.4 on 11/30/2023 and 8.5 on presentation but improved to 9.0.  He is Hemoccult positive. He has not noted any visible blood in stools. Denies fever, chills, abdominal pain, black of bloody stools, chest pain or dizziness.    ROS: A comprehensive ten point review of systems was negative aside from those in mentioned in the HPI.      PAST MED HX:  I have reviewed this patient's medical history and updated it with pertinent information if needed.   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 Vasques     Aortic stenosis      Severe AS, 9/2015 AVR - [facility] TRIFECTA Bovine bioprosthesis 25MM [id number]    Atrial fibrillation (H)     9/2015 Paroxysmal post op Afib - discharged on Warfarin and a beta blocker    COPD (chronic obstructive pulmonary disease) (H)     Deep vein thrombosis (H)     GERD (gastroesophageal reflux disease)     Heart murmur     Monoclonal gammopathy     plasmacyte prominent causing monoclonal gammopathy    Need for SBE (subacute bacterial endocarditis) prophylaxis     Neuropathy     Other and unspecified hyperlipidemia     Other malignant lymphomas     non hodgkin's lymphoma    RBBB (right bundle branch block)     Severe sepsis with acute organ dysfunction (H) 11/16/2015    Unspecified hereditary and idiopathic peripheral neuropathy        MEDICATIONS:   Prior to Admission Medications   Prescriptions Last Dose Informant Patient Reported? Taking?   DULoxetine (CYMBALTA) 60 MG capsule   Yes Yes   Sig: Take 60 mg by mouth daily   acetaminophen (TYLENOL) 500 MG tablet PRN Self Yes Yes   Sig: Take 1,000 mg by mouth 3 times daily as needed   albuterol (PROAIR HFA/PROVENTIL HFA/VENTOLIN HFA) 108 (90 Base) MCG/ACT inhaler PRN Self Yes Yes   Sig: Inhale 2 puffs into the lungs every 6 hours as needed   albuterol (PROVENTIL) (2.5 MG/3ML) 0.083% neb solution Not Taking Self No No   Sig: Take 1 vial (2.5 mg) by nebulization every 6 hours as needed for shortness of breath / dyspnea or wheezing   Patient not taking: Reported on 1/9/2024   atorvastatin (LIPITOR) 10 MG tablet  Self No Yes   Sig: Take 1 tablet (10 mg) by mouth daily   famotidine (PEPCID) 40 MG tablet  Self No Yes   Sig: TAKE ONE TABLET BY MOUTH TWICE DAILY   ferrous sulfate (FE TABS) 325 (65 Fe) MG EC tablet  Self Yes Yes   Sig: Take 1 tablet (325 mg) by mouth 2 times daily   fludrocortisone (FLORINEF) 0.1 MG tablet  Self No Yes   Sig: Take 1 tablet (0.1 mg) by mouth daily   fluticasone-vilanterol (BREO ELLIPTA) 200-25 MCG/ACT inhaler Not Taking Self Yes No   Sig: Inhale  "1 puff into the lungs daily   Patient not taking: Reported on 2024   gabapentin (NEURONTIN) 300 MG capsule  Self No Yes   Sig: Take 2 capsules (600 mg) by mouth At Bedtime For neuropathy pain   hydrOXYzine HCl (ATARAX) 25 MG tablet PRN  No Yes   Sig: Take 2 tablets (50 mg) by mouth every 6 hours as needed   tamsulosin (FLOMAX) 0.4 MG capsule   No Yes   Sig: Take 1 capsule (0.4 mg) by mouth daily   tiotropium (SPIRIVA) 18 MCG inhaled capsule  Self Yes Yes   Sig: Inhale 18 mcg into the lungs daily   vancomycin (VANCOCIN) 125 MG capsule Not Taking  No No   Sig: Take 1 capsule (125 mg) by mouth 4 times daily   Patient not taking: Reported on 2024      Facility-Administered Medications: None       ALLERGIES:   Allergies   Allergen Reactions    Omeprazole Itching    Pantoprazole Itching    Prevacid [Lansoprazole] Itching    Lasix [Furosemide] Rash    Lidocaine Blisters and Rash     Allergy to lidocaine ointment  Allergy to lidocaine ointment      Penicillin G Rash    Penicillins Rash     \"broke out from injection\" 60 yrs ago  Tolerates cephalosporins       SOCIAL HISTORY:  Social History     Tobacco Use    Smoking status: Former     Packs/day: 2.00     Years: 55.00     Additional pack years: 0.00     Total pack years: 110.00     Types: Cigarettes     Quit date: 1998     Years since quittin.9    Smokeless tobacco: Never   Vaping Use    Vaping Use: Never used   Substance Use Topics    Alcohol use: Not Currently    Drug use: No       FAMILY HISTORY:  Family History   Problem Relation Age of Onset    C.A.D. Father     Emphysema Father     Melanoma No family hx of     Skin Cancer No family hx of        PHYSICAL EXAM:   General  alert, oriented and comfortable  Vital Signs with Ranges  Temp: 97.6  F (36.4  C) Temp src: Oral BP: 134/65 Pulse: 84   Resp: 16 SpO2: 92 % O2 Device: Nasal cannula Oxygen Delivery: 4 LPM  I/O last 3 completed shifts:  In: -   Out: 200 [Urine:200]    Constitutional: healthy, alert, and " no distress   Cardiovascular: negative, PMI normal. No lifts, heaves, or thrills. RRR. No murmurs, clicks gallops or rub  Respiratory: negative, Percussion normal. Good diaphragmatic excursion. Lungs clear  Abdomen: Abdomen soft, non-tender. BS normal. No masses, organomegaly        ADDITIONAL COMMENTS:   I reviewed the patient's new clinical lab test results.   Recent Labs   Lab Test 01/09/24 0637 01/09/24  0007 01/08/24 1952 01/08/24 1755 11/30/23  0622 09/13/23  0458 09/12/23  1628 10/10/22  0700 10/09/22  1353   WBC 6.7  --   --  10.2 6.8   < > 25.5*   < > 25.3*   HGB 9.0* 8.7* 8.5* 8.7* 10.4*   < > 11.5*   < > 8.5*   MCV 84  --   --  84 85   < > 87   < > 97   *  --   --  142* 122*   < > 188   < > 194   INR  --   --   --  1.53*  --   --  1.15  --  1.19*    < > = values in this interval not displayed.     Recent Labs   Lab Test 01/09/24 0637 01/08/24 1755 12/01/23  1022 11/30/23  0622 11/29/23  0936   POTASSIUM 4.7 4.5 4.0   < > 3.5  3.5   CHLORIDE 107 105  --   --  110*   CO2 28 26  --   --  26   BUN 23.8* 26.1*  --   --  16.2   ANIONGAP 5* 8  --   --  9    < > = values in this interval not displayed.     Recent Labs   Lab Test 01/09/24  0018 01/08/24 1755 11/26/23  0047 11/25/23  2301 09/12/23  1628 08/17/23  0213 08/16/23  2313   ALBUMIN  --  2.9*  --  3.6 3.3*  --  3.0*   BILITOTAL  --  0.3  --  0.6 0.6  --  0.4   ALT  --  <5  --  7 16  --  7   AST  --  7  --  12 13  --  11   PROTEIN 20*  --  50*  --   --  20*  --    LIPASE  --   --   --  16 12*  --  14       I reviewed the patient's new imaging results.        CONSULTATION ASSESSMENT AND PLAN:    Hermilo Olson is a 89 year old male with acute on chronic anemia, admitted 1/8/2024 with acute on chronic anemia and a fall     Acute on chronic anemia  Prior GI bleed due to AVMs  Chronically anticoagulated on Eliquis  Hemoglobin baseline is 10.5and has decrease to 8.5 and now 9.0  He has had no signs of active bleeding.   EGD in 04/2022 revealed  erosive gastropathy.  6/2022 Colonoscopy noted angiodysplastic lesions with no bleeding. They were cauterized. Recommended to have an outpatient capsule endoscopy but canceled appt.  EGD in 08/2020 noted non bleeding angioectasias.   Colonoscopy 10/2020 noted six recently bleeding colonic angiodysplastic lesions- treated with APC. One 10 mm polyp removed in sigmoid colon.   Has been anticoagulated on apixiban and currently held  Cause of anemia may be multifactorial, vs slow upper or lower GI bleed. Discussed options to consider EGD and colonoscopy. Patient prefers to avoid any procedures if possible. GIven stable hemoglobin     -- Regular diet  -- would start IV pantoprazole 40 mg BID but allergic per records- continue famotidine  --q 12 hour hemoglobin and transfuse for hemoglobin of 7 or less  -- ok with GI to restart apixiban           [facility].  Office: [phone]  Cell : [phone] (Dr. Florentino)  Cell: [phone] (Sammie Cox PA-C)

## 2024-01-09 NOTE — PROVIDER NOTIFICATION
MD Notification    Notified Person: MD    Notified Person Name: Dennise    Notification Date/Time: 1619 1/9/24    Notification Interaction: Vocera    Purpose of Notification: Per previous RN, ZAHRA pt had large black formed stool at 1430 today.    Orders Received: CTM pt    Comments:

## 2024-01-09 NOTE — PROVIDER NOTIFICATION
"MD Notification    Notified Person: MD    Notified Person Name:Dr Bowden    Notification Date/Time: 1215    Notification Interaction:Vocea messaged    Purpose of Notification:; Orthostatic hypotension when standing and  involuntary unusual jerking movements of both feet/legs when position changed from supine to sitting. Asking MD for advisement    Orders Received:    Comments:  Lying 118/60 HR 89, Sitting 102/60 HR94, Standing BP 84/36 HR 96 Asymptomatic. Pt denied any vertigo during the position changes.   Writer assumes this low BP when standing is a contributing cause to his recent falls  But in addition, pt had intermittent involuntary jerking movements of both legs feet going from lying to sitting lasting about 15 seconds (witnessed by myself  and Tiffanie MARIN) Pt stated \"...this has been happening at home at random times on their own during the day\" ... he can't control it and doesn't know what triggers this\"  and that \"those strange movements cause me to fall.\"  Pt feels that his involuntary low extremity intermittent  jerking movements \"is the reason why his is here\".  Please advise.     "

## 2024-01-09 NOTE — ED PROVIDER NOTES
History     Chief Complaint:  Syncope       HPI   Hermilo Velasquez is a 89 year old male history of recurrent C. difficile colitis, adenocarcinoma the right lung, COPD, chronic hypoxic respiratory failure on 2 L nasal cannula, DVT, PE, paroxysmal A-fib, on apixaban, hypertension, CKD stage III, hyperlipidemia, non-Hodgkin's lymphoma, with recent admission for C. difficile and December, sent in for syncope.  Patient is a very limited historian.  Per EMS, he had a syncopal episode at rest.  Patient denies headache or neck pain.  Denies falls.  Denies chest pain, shortness of breath, abdominal pain.  Did state he vomited.      Independent Historian:    Per spouse, patient fell a couple times today - she reports he felt weak today and fell. She declines any other issues at home including no fever or vomiting. She states the last fall, he was eating a cookie in the chair and fell out.    Review of External Notes:  Discharge summary 1 December 2023-sepsis, PE.    Allergies:  Omeprazole  Pantoprazole  Prevacid [Lansoprazole]  Lasix [Furosemide]  Lidocaine  Penicillin G  Penicillins     Physical Exam   Patient Vitals for the past 24 hrs:   BP Temp Temp src Pulse Resp SpO2 Weight   01/09/24 0000 -- -- -- -- -- -- 62.1 kg (136 lb 14.5 oz)   01/08/24 2300 -- -- -- -- -- 94 % --   01/08/24 2241 102/57 97.9  F (36.6  C) Oral 79 16 90 % --   01/08/24 2030 112/62 -- -- 75 16 92 % --   01/08/24 2000 114/62 -- -- 68 15 95 % --   01/08/24 1950 121/60 -- -- 66 14 97 % --   01/08/24 1748 95/46 97.5  F (36.4  C) Temporal 63 14 96 % --        Physical Exam  GENERAL: Patient appears tired, sleeping, but arouses to voice.  HEAD: Atraumatic.  NECK: No rigidity  Mouth: Brownish tinged vomit on lips.  No active vomiting.    CV: RRR, no murmurs rubs or gallops  PULM: Diminished breath sounds bilaterally.  No accessory muscle use.  ABD: Soft, nontender, nondistended, no guarding  DERM: No rash. Skin warm and dry  Rectal: Chaperone  present-black tinge stool.  No active bleeding.  EXTREMITY: Moving all extremities without difficulty.   VASCULAR: Symmetric pulses bilaterally  Neuro: Alert.  Face symmetric.  Equal strength upper and lower extremities.    Emergency Department Course   ECG  ECG interpreted by me.  Time 1744  NSR at 68 with first-degree AV block.  Occasional PVC.  Right bundle branch block.  No STEMI.        Laboratory: Imaging:   Labs Ordered and Resulted from Time of ED Arrival to Time of ED Departure   COMPREHENSIVE METABOLIC PANEL - Abnormal       Result Value    Sodium 139      Potassium 4.5      Carbon Dioxide (CO2) 26      Anion Gap 8      Urea Nitrogen 26.1 (*)     Creatinine 1.50 (*)     GFR Estimate 44 (*)     Calcium 8.4      Chloride 105      Glucose 115 (*)     Alkaline Phosphatase 90      AST 7      ALT <5      Protein Total 5.5 (*)     Albumin 2.9 (*)     Bilirubin Total 0.3     CBC WITH PLATELETS AND DIFFERENTIAL - Abnormal    WBC Count 10.2      RBC Count 3.37 (*)     Hemoglobin 8.7 (*)     Hematocrit 28.2 (*)     MCV 84      MCH 25.8 (*)     MCHC 30.9 (*)     RDW 16.9 (*)     Platelet Count 142 (*)     % Neutrophils 84      % Lymphocytes 7      % Monocytes 7      % Eosinophils 2      % Basophils 0      % Immature Granulocytes 0      NRBCs per 100 WBC 0      Absolute Neutrophils 8.6 (*)     Absolute Lymphocytes 0.7 (*)     Absolute Monocytes 0.7      Absolute Eosinophils 0.2      Absolute Basophils 0.0      Absolute Immature Granulocytes 0.0      Absolute NRBCs 0.0     ISTAT GASES LACTATE VENOUS POCT - Abnormal    Lactic Acid POCT 0.8      Bicarbonate Venous POCT 26      O2 Sat, Venous POCT 74 (*)     pCO2 Venous POCT 57 (*)     pH Venous POCT 7.26 (*)     pO2 Venous POCT 46     HEMOGLOBIN - Abnormal    Hemoglobin 8.5 (*)    ROUTINE UA WITH MICROSCOPIC   TYPE AND SCREEN, ADULT    ABO/RH(D) A POS      Antibody Screen Negative      SPECIMEN EXPIRATION DATE [id number]     ABO/RH TYPE AND SCREEN     Head CT w/o  contrast   Final Result   IMPRESSION:   1.  No CT evidence for acute intracranial process.   2.  Mild chronic microvascular ischemic changes as above.      XR Chest Port 1 View   Final Result   IMPRESSION: Sternotomy. Heart size upper limits of normal and slightly increased since previous examination. Prominence of the central pulmonary vasculature stable. Masslike opacity or consolidation right lower garcia/infrahilar region slightly increased.    Coarse infiltrates scattered in the right lung slightly increased in the right upper lung and decreased in the right lower lung laterally. Interstitial infiltrates left mid lower lung, decreased in midlung and increased in the left lung medially.    Superior subluxation right femoral head chronic rotator cuff arthropathy. Small right pleural effusion stable.                 Emergency Department Course & Assessments:             Interventions:  Medications   albuterol (PROVENTIL) neb solution 2.5 mg (has no administration in time range)   fluticasone-vilanterol (BREO ELLIPTA) 200-25 MCG/ACT inhaler 1 puff (has no administration in time range)   tamsulosin (FLOMAX) capsule 0.4 mg (has no administration in time range)   umeclidinium (INCRUSE ELLIPTA) 62.5 MCG/ACT inhaler 1 puff (has no administration in time range)   vancomycin (VANCOCIN) capsule 125 mg (125 mg Oral $Given 1/9/24 0016)   lidocaine 1 % 0.1-1 mL (has no administration in time range)   lidocaine (LMX4) cream (has no administration in time range)   sodium chloride (PF) 0.9% PF flush 3 mL (3 mLs Intracatheter $Given 1/9/24 0017)   sodium chloride (PF) 0.9% PF flush 3 mL (has no administration in time range)   senna-docusate (SENOKOT-S/PERICOLACE) 8.6-50 MG per tablet 1 tablet (has no administration in time range)     Or   senna-docusate (SENOKOT-S/PERICOLACE) 8.6-50 MG per tablet 2 tablet (has no administration in time range)   calcium carbonate (TUMS) chewable tablet 1,000 mg (has no administration in time range)    sodium chloride 0.9 % infusion ( Intravenous $New Bag 1/9/24 0017)   acetaminophen (TYLENOL) tablet 650 mg (has no administration in time range)     Or   acetaminophen (TYLENOL) Suppository 650 mg (has no administration in time range)   ondansetron (ZOFRAN ODT) ODT tab 4 mg (has no administration in time range)     Or   ondansetron (ZOFRAN) injection 4 mg (has no administration in time range)   famotidine (PEPCID) injection 20 mg (has no administration in time range)   fludrocortisone (FLORINEF) tablet 0.1 mg (has no administration in time range)   sodium chloride 0.9% BOLUS 500 mL (0 mLs Intravenous Stopped 1/8/24 1849)   famotidine (PEPCID) injection 20 mg (20 mg Intravenous $Given 1/8/24 1950)        Assessments, Independent Interpretation, Consult/Discussion of ManagementTests:   Discussed with hospitalist Dr. Kelly for admission and further evaluation including potential GI consult and endoscopy versus colonoscopy.  Chest x-ray with scattered infiltrates.  Social Determinants of Health affecting care:  None    Disposition:  The patient was admitted to the hospital under the care of Dr. Kelly.     Impression & Plan         Medical Decision Making:  Patient presenting with recurrent fall.  Chronic conditions complicating-paroxysmal A-fib-high risk for arrhythmia and on anticoagulation, high risk of bleed.  Differential diagnosis-considered arrhythmia, metabolic abnormality, bleed, among others.  Vital signs initially hypotensive to the high 80s.  Given 500 cc IV fluids and blood pressure improved.  Rectal exam notable for occult positive and hemoglobin is down from prior.  He is anticoagulated.  I did have concerns for GI bleed.  Given famotidine as he has PPI allergy.  Labs also show an elevated BUN and creatinine.  ECG without obvious cause for syncope.  Difficult history as she may or may not have syncopized.  CT head negative for bleed.  Chest x-ray with scattered infiltrates.  Baseline has have normal  lungs due to COPD and adenocarcinoma-no fever, leukocytosis, or cough to suggest pneumonia.  Will hold off on antibiotics.  Ultimately, discussed with hospitalist for further workup and evaluation.  Diagnosis:    ICD-10-CM    1. Syncope and collapse  R55       2. Upper GI bleed  K92.2       3. Anemia, unspecified type  D64.9       4. FERD (acute kidney injury) (H24)  N17.9       5. Fall, initial encounter  W19.XXXA            Discharge Medications:  Current Discharge Medication List             1/8/2024   Van Romano MD Foss, Kevin, MD  01/09/24 0032

## 2024-01-09 NOTE — PROGRESS NOTES
"CLINICAL NUTRITION SERVICES  -  ASSESSMENT NOTE    Recommendations Ordered by Registered Dietitian (RD):   Ordered MVI/M w/ cosign   Malnutrition:   % Weight Loss:  > 7.5% in 3 months (severe malnutrition)  % Intake:  Unable to determine w/ limited hx  Subcutaneous Fat Loss:  Orbital region Severe depletion, Upper Arm Severe depletion  Muscle Loss:  Temporal region Moderate depletion, Clavicle bone region Severe depletion, and Dorsal hand region Severe depletion  Fluid Retention:  None noted    Malnutrition Diagnosis: Severe malnutrition  In Context of:  Acute illness or injury  Chronic illness or disease     REASON FOR ASSESSMENT  Hermilo Velasquez is a 89 year old male seen by Registered Dietitian for Admission Nutrition Risk Screen for answering \"unsure\" to recently losing weight without trying and \"yes\" to recently eating poorly d/t a decrease in appetite.    PMH of recurrent c. diff, adenocarcinoma of the right lung, COPD/emphysema w/ chronic resp failure, hx of DVT/PE, afib, HTN, CKD3, dyslipidemia, BPH, non-Hodgkin lymphoma, GERD. Presents with suspected GI bleed and a fall at home.    NUTRITION HISTORY    - per chart review from previous RD notes, patient has historically met both moderate and severe malnutrition criteria. Has had trials of both Ensure and Magic Cup.  - Attempted to speak with patient at bedside however sleeping. Per RN, patient has some short term memory loss and can be quite confused at baseline. Did come back later, patient very [place] and seemed quite confused when answering questions. He said he hasn't noticed any weight loss, declined supplements, and just wanted his peaches opened for him (opened). Unable to get accurate nutrition history at this time. Writer offered support and encouragement with intakes moving forward.    CURRENT NUTRITION ORDERS  Diet Order: Regular    Current Intake/Tolerance:  - Ordered 1 meal so far this admit, lunch today per [facility]  - No intakes documented " "yet per flowsheets    NUTRITION FOCUSED PHYSICAL ASSESSMENT FOR DIAGNOSING MALNUTRITION)  Yes         Observed:    Muscle wasting (refer to documentation in Malnutrition section) and Subcutaneous fat loss (refer to documentation in Malnutrition section)    ANTHROPOMETRICS  Height: 5'10\"  Weight: 62.1 kg, 136 lbs 14.49 oz  Body mass index is 19.64 kg/m .  Weight Status:  Normal BMI  IBW: 75.5 kg  % IBW: 82%  Weight History: 8% wt loss in 3 months  Wt Readings from Last 10 Encounters:   01/09/24 62.1 kg (136 lb 14.5 oz)   12/07/23 63.5 kg (140 lb)   11/30/23 63.8 kg (140 lb 10.5 oz)   10/26/23 66.7 kg (147 lb 1.6 oz)   10/19/23 67.6 kg (149 lb 1.6 oz)   09/29/23 69.4 kg (152 lb 14.4 oz)   09/16/23 71.6 kg (157 lb 13.6 oz)   09/07/23 68.1 kg (150 lb 3.2 oz)   08/29/23 68.9 kg (151 lb 14.4 oz)   08/17/23 67.4 kg (148 lb 9.4 oz)     LABS  BUN 23.8 (H), Cr 1.25 (H), GFR 54 (L)    MEDICATIONS  IVF @ 50 mL/hr    ASSESSED NUTRITION NEEDS PER APPROVED PRACTICE GUIDELINES:  Dosing Weight 62.1 kg  Estimated Energy Needs: 2683-3835 kcals (30-35 Kcal/Kg)  Justification: repletion  Estimated Protein Needs: 75-93 grams protein (1.2-1.5 g pro/Kg)  Justification: Repletion  Estimated Fluid Needs: 1 mL/kcal or per provider pending fluid status    NUTRITION DIAGNOSIS:  Inadequate oral intake related to suspected poor appetite as evidenced by only 1 meal ordered so far this admit, 8% wt loss in 3 months, evident subcutaneous fat/muscle loss on exam    NUTRITION INTERVENTIONS  Recommendations / Nutrition Prescription  Ordered MVI/M    Implementation  Nutrition education - Not appropriate at this time due to patient condition  Multivitamin/Mineral - ordered    Nutrition Goals  Patient to consume % of nutritionally adequate meal trays TID, or the equivalent with supplements/snacks.    MONITORING AND EVALUATION:  Progress towards goals will be monitored and evaluated per protocol and Practice Guidelines    Echo Lunsford RD, " GUERO  Clinical Dietitian - [facility]

## 2024-01-09 NOTE — CARE PLAN
Late entry Nursing [id number] shift note  Orientation: A&O x2. Disoriented to time and situation. Confused and hard of hearing. Does not have hearing aid.   Vitals : VSS on 4L O2 nasal cannula. weaned down  to 2LPM. On tele running A-Fib CVR w. BBB and occasional PVC.  Has orthostatic hypotension especially when standing but was asymptomatic . - refer to my vitals charted in flowsheet. MD notified and Compression stockings ordered - nest shift to apply  Aggression Stop Light: Green  Mobility: Remains very unsteady. Requires Ax2 w/ GB+walker due to pt unsteady and has O2 and IV fluids running. Was in chair x1 this shift  Pain Management: Denies pain, nausea, and SOB.   Diet: Cl diet advanced to regular diet by GI  Bowel/Bladder: Large formed BLACK stool this afternoon . Continent of urine and stool.   Abnormal Lab/Assessments: Drain/Device/Wound: R PIV infusing NS at 50mL/hr.   Consults: GI, SW  D/C Day/Goals/Place: Lives with his wife.  Anticipating TCU for rehab

## 2024-01-10 NOTE — PROGRESS NOTES
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 CUrrently stable in 8-9 range at 8.8  Large black stool overnight  EGD in 04/2022 revealed erosive gastropathy.  6/2022 Colonoscopy noted angiodysplastic lesions with no bleeding. They were cauterized. Recommended to have an outpatient capsule endoscopy but canceled appt.  EGD in 08/2020 noted non bleeding angioectasias.   Colonoscopy 10/2020 noted six recently bleeding colonic angiodysplastic lesions- treated with APC. One 10 mm polyp removed in sigmoid colon.   Has been anticoagulated on apixiban and currently held  Cause of anemia may be multifactorial, vs slow upper or lower GI bleed. Discussed options to consider EGD and colonoscopy. Patient prefers to avoid any procedures if possible. GIven stable hemoglobin I agree with this plan.     -- Regular diet  -- would start IV pantoprazole 40 mg BID but allergic per records- continue famotidine  --q 12 hour hemoglobin and transfuse for hemoglobin of 7 or less  -- ok with GI to restart apixiban                  Interval History:     no new complaints, doing well, and doing well; no cp, sob, n/v/d, or abd pain.              Review of Systems:     C: NEGATIVE for fever, chills, change in weight  E/M: NEGATIVE for ear, mouth and throat problems  R: NEGATIVE for significant cough or SOB  CV: NEGATIVE for chest pain, palpitations or peripheral edema             Medications:   I have reviewed this patient's current medications   famotidine  20 mg Intravenous Q12H    fludrocortisone  0.1 mg Oral Daily    fluticasone-vilanterol  1 puff Inhalation  Daily    multivitamin, therapeutic  1 tablet Oral Daily    sodium chloride (PF)  3 mL Intracatheter Q8H    tamsulosin  0.4 mg Oral Daily    umeclidinium  1 puff Inhalation Daily    vancomycin  125 mg Oral 4x Daily                  Physical Exam:   Vitals were reviewed  Vital Signs with Ranges  Temp:  [97.5  F (36.4  C)-99.2  F (37.3  C)] 99.2  F (37.3  C)  Pulse:  [52-88] 52  Resp:  [18-22] 20  BP: (123-127)/(58-72) 123/72  SpO2:  [92 %-97 %] 92 %  I/O last 3 completed shifts:  In: 240 [P.O.:240]  Out: 850 [Urine:850]  Constitutional: healthy, alert, and no distress   Cardiovascular: negative, PMI normal. No lifts, heaves, or thrills. RRR. No murmurs, clicks gallops or rub  Respiratory: negative, Percussion normal. Good diaphragmatic excursion. Lungs clear  Abdomen: Abdomen soft, non-tender. BS normal. No masses, organomegaly             Data:   I reviewed the patient's new clinical lab test results.   Recent Labs   Lab Test 01/10/24  0538 01/09/24  1800 01/09/24  1136 01/09/24  0637 01/08/24  1952 01/08/24  1755 09/13/23  0458 09/12/23  1628 10/10/22  0700 10/09/22  1353   WBC 5.3  --   --  6.7  --  10.2   < > 25.5*   < > 25.3*   HGB 8.8* 9.0* 9.5* 9.0*   < > 8.7*   < > 11.5*   < > 8.5*   MCV 84  --   --  84  --  84   < > 87   < > 97   *  --   --  126*  --  142*   < > 188   < > 194   INR  --   --   --   --   --  1.53*  --  1.15  --  1.19*    < > = values in this interval not displayed.     Recent Labs   Lab Test 01/10/24  0538 01/09/24  0637 01/08/24  1755   POTASSIUM 4.0 4.7 4.5   CHLORIDE 107 107 105   CO2 24 28 26   BUN 19.4 23.8* 26.1*   ANIONGAP 7 5* 8     Recent Labs   Lab Test 01/09/24  0018 01/08/24  1755 11/26/23  0047 11/25/23  2301 09/12/23  1628 08/17/23  0213 08/16/23  2313   ALBUMIN  --  2.9*  --  3.6 3.3*  --  3.0*   BILITOTAL  --  0.3  --  0.6 0.6  --  0.4   ALT  --  <5  --  7 16  --  7   AST  --  7  --  12 13  --  11   PROTEIN 20*  --  50*  --   --  20*  --    LIPASE  --   --   --  16 12*   --  14       I reviewed the patient's new imaging results.    All laboratory data reviewed  All imaging studies reviewed by me.    Sammie Cox PA-C,  1/10/2024  [facility]  Office : [phone]  Cell: [phone] (Dr. Florentino)  Cell: [phone] (Sammie Cox PA-C)

## 2024-01-10 NOTE — PROGRESS NOTES
Care Management Follow Up    Length of Stay (days): 2    Expected Discharge Date: 01/11/2024     Concerns to be Addressed:       Patient plan of care discussed at interdisciplinary rounds: Yes    Anticipated Discharge Disposition: Home, Home Care     Anticipated Discharge Services: None  Anticipated Discharge DME:      Patient/family educated on Medicare website which has current facility and service quality ratings: no  Education Provided on the Discharge Plan:    Patient/Family in Agreement with the Plan: other (see comments) (per spouse, pt will want to discharge home and not go to TCU)    Referrals Placed by CM/SW: Internal Clinic Care Coordination, Scheduled Follow-up appointments  Private pay costs discussed: Not applicable    Additional Information:   spoke with patients family and they would like to take him home with home care. Writer sent referral for Home Care PT/OT/RN    [facility] can accept patient 24-48 hours after discharge for PT/OT/RN    SY Hoang

## 2024-01-10 NOTE — PLAN OF CARE
Goal Outcome Evaluation: [id number]    Disoriented to time. Very [place]. Up 1 assist GB/RW. Denies pain. On tele, afib with BBB and occasional PVC's. Voiding spontaneously. VSS on 2L NC. R PIV infusing NS @ 50 ml/hr. Next hemoglobin recheck @ 0600.

## 2024-01-10 NOTE — PLAN OF CARE
Goal Outcome Evaluation:       [id number]  Orientation: A&O x3. Disoriented to time. Pt is  hard of hearing. Does not have hearing aid.   Mobility: Ax1 w/ GB+walker. Worked with PT and OT today  Pain Management: Denies pain  Tele : A fib with PVCs  Diet: regular  Bowel/Bladder: Continent of urine. Uses bedside urinal and ambulates to bathroom.   Abnormal Lab/Assessments: VSS on 2L O2 nasal cannula.    Drain/Device/Wound: R PIV infusing NS at 50mL/hr.   Consults: GI, SW, PT , OT  D/C Day/Goals/Place:   Shift Note:  Hgb lab draw Q12hrs. Denies SOB, nausea.

## 2024-01-10 NOTE — PLAN OF CARE
Orientation: A&O x3, disoriented to time,  [facility]  Activity: A1 w GBW, ambulated to bathroom x2 this shift  Diet/BS Checks: Regular diet  Tele:  Afib w PVCs  IV Access/Drains: L PIV infusing NS at 50ml/hr  Pain Management:  Denies pain this shift  Abnormal VS/Results: VSS on 2L via NC.hgb= 9.0  Bowel/Bladder: Continent of B/B, pt using urinal at bedside w good UOP. No BM this shift  Skin/Wounds: Scattered bruising, redness blanchable on sacrum/coccyx  Consults:  GI, SW, PT/OT  D/C Disposition: Discharge pending  Other Info:

## 2024-01-10 NOTE — PROGRESS NOTES
[facility]    Hospitalist Progress Note    Brief Summary:  Hermilo Velasquez is a 89 year old male with h/o recurrent C. difficile colitis, adenocarcinoma of the right lung, COPD/emphysema with chronic hypoxic respiratory failure, on 2L of oxygen, h/o DVT/PE, paroxysmal atrial fibrillation, hypertension, CKD stage III, dyslipidemia, BPH, non-Hodgkin lymphoma, GERD, who presented on 1/8/2024 with episode of mechanical fall at home      He was hospitalized previously from 11/25 - 12/1/2023 due to recurrent C. difficile colitis for which he remains on p.o. vancomycin.  At that time he was also found to have small PE for which he was started on apixaban.  He has known history of GI bleed in past.   His last EGD was 8/23/2022 with that showed small hiatal hernia, normal esophagus, normal stomach, normal duodenum.   Last colonoscopies 4/10/2022 and 10/2020 that showed multiple nonbleeding colonic angiodysplastic lesions that were treated with APC, diverticulosis of sigmoid colon and descending colon.  Video capsule study was recommended, unknown if this was pursued or not.     His hemoglobin was 10.4 on 11/30/2023 and is now down to 8.5 with Hemoccult positive.      Assessment & Plan        Acute on chronic normocytic anemia, suspect GI bleed  -Hemoglobin 8.5, down from 10.4.  Hemoccult positive.  Was started on apixaban 6 weeks ago due to small PE.  Has known history of GI bleed, previously anticoagulation was discontinued because of having GI bleed at the time.  -Most recent EGD and colonoscopies reviewed, most likely has AVM and bleeding from there.  Continue with IV Pepcid, as the patient is allergic to PPI.  No active bleeding at this time.  I will continue hold apixaban at this time.    We may need to discuss with the patient and the family about the risk and benefit of continued anticoagulation.  In my opinion for this subsegmental PE, we can hold off onto the anticoagulation at this  time.  Will discuss this with the family.    -IV Pepcid.  Has multiple PPIs listed in allergies.  If he is actively bleeding, source most likely to be in colon with previous findings of angioectasias and diverticulosis.  Hemoglobin has been stable at this time,remained stable around 8.5.  Dark black stool last evening most likely old blood no active bleeding.  Hemoglobin is overall stable around 8.8 today.  I have a long discussion with the patient, and patient wife Roberta over the phone, discussed risk and benefit of continuing anticoagulation.  She told me that she  does not like the idea of him being on anticoagulation and she agree on stopping the Eliquis at this time.  Will discontinue Eliquis.  Continue to monitor keep him on IV Pepcid.      Mechanical fall at home   Orthostatic hypotension  -Confirmed with patient's wife, he did not have syncope.  He had mechanical fall twice on day of admission.  First episode was right after he woke up in the morning and stand up.  Second episode was when he was sitting in chair and checking his mail, male fell down and he bent to pick it up, lost his balance and fell.  No loss of consciousness  -Head CT showed no acute changes  -Overall very frail with multiple health issues  -Most recent echo was 1/3/2024 and showed normal LV systolic function with an EF of 55-60% RV was not well-visualized but appeared to be dilated with reduced function and prosthetic aortic valve well-visualized.  Overall There was no significant change from before   -He is on IV fluid h NS at 50 ml/h  -Monitor on telemetry  -Orthostatic vital signs, does show significant orthostatic hypotension.  -PT/OT evaluation  - consulted.  -TCU has been recommended during previous admissions but patient's wife had always preferred for him to return home  PT OT evaluated him and recommending TCU, discussed with the patient and the patient's wife they want to take him home.  I agree with home health  PT OT and RN.    With regard to his orthostatic hypotension he is already on Florinef, I will apply thigh-high compression stockings.  Fluid on changing position.  His blood pressure is stable, hemoglobin stable discontinue IV fluids on 1/10/2024.  If he still have significant hypertension we can start him on low-dose midodrine 2.5 mg 3 times a day.     Recurrent C. difficile procto-colitis  -Most recently hospitalized from 11/25/2023 to 12/1/2023 due to recurrent C. difficile proctocolitis.    - ID was consulted and recommended 4 times daily p.o. vancomycin until further evaluation with Keerthi BLAKE for fecal microbiota transplantation.    -Multiple referrals have been placed for this but patient has not yet followed up for this.    Patient has no active diarrhea at this time, I do recommend prolonged tapering dose of vancomycin at that time, last episode was in November 25, 2023.  He does not need to be on vancomycin 4 times a day at this time.  As he was not taking at home either, he is not currently on antibiotics.  Will discontinue the oral vancomycin        Subsegmental left lower lobe pulmonary embolism, 11/25/2023  - Noted on CT PE on 11/25.  Was started on apixaban, CT scan reviewed, few scattered small filling defects in the subsegmental pulmonary arteries more prominent in the left lower lobe, the radiologist think that represent small pulm emboli  -Patient is a high risk for GI bleeding, and he bleeds again, I think we can hold off on anticoagulation in his case.  I discussed with the patient, patient's wife Roberta over the phone, she is agree with the stopping the Eliquis at this time.  Patient told me that his doctor [facility] does not want [facility] to be on anticoagulation either.  Will discontinue Eliquis at this time     Adenocarcinoma of the right lung, diagnosed 2019, s/p wedge resection  Right lower lobe consolidation-concerning for recurrence of malignancy   Bilateral pulmonary nodules  - follows with [place]  [facility].  Was diagnosed with stage I adenocarcinoma of right lower lobe in 2019 and is s/p limited thoracotomy with wedge resection in March 2019.    - CT chest on 9/11 - Multiple new pulmonary nodules. 1.8 cm nodule in the RLL WAS concerning for recurrent malignancy. Multiple bilateral new pulmonary nodules were also concerning for metastatic disease   - PET scan 9/22 showed moderate FDG activity in the left seventh rib anterolaterally, nonspecific and could represent a lymphoma/myeloma vs the sequelae of trauma. Small LLL pulmonary nodules too small to characterize.  FDG activity in the right lower lobe near the wedge resection, most likely inflammation.  Continued CT or PET/CT surveillance recommended. FDG avid soft tissue nodule in the right tongue base may represent a synchronous squamous cell carcinoma.  Plan was to repeat CT chest in 2 months and refer to ENT.  Has not been seen by ENT yet.   - CT PE on 11/25 showed RLL patchy consolidative pulmonary opacities, worrisome for local recurrence vs infection, Multiple pulmonary nodules most prominent in the left lower lobe, slightly increased in size as compared to 09/11/2023, moderate right pleural effusion  -Most recently saw oncology on 12/13/2023.  He was not felt to be a candidate for chemotherapy.  Oncology planning to obtain PET scan and IR consult for biopsy of nodule.  Per wife biopsy was scheduled for 1/9        FDG avid soft tissue nodule in right tongue base - concerning for squamous cell carcinoma  - Heme-onc had made referral to ENT as outpatient.  Continue to follow-up with ENT.  Per patient's wife, this is scheduled for 1/11     COPD with emphysema  Chronic hypoxic respiratory failure on oxygen 2 L  -Apparently saturations were low at home but have been at baseline since his arrival to ER.    -Chest x-ray shows stable central pulmonary vasculature, masslike opacity/consolidation in right lower lobe slightly increased from before, interstitial  infiltrates.  -Chest x-ray findings appear chronic.  No fever, no increase in cough, no leukocytosis  -Monitor for now.  He is at increased risk for pneumonias.  Not starting any antibiotics until clear evidence of infection due to recurrent C. difficile  - Continue supplemental oxygen 2 L/min  - Continue home inhaler     Severe aortic stenosis, s/p bioprosthetic aortic valve replacement  Dyslipidemia  - Continue atorvastatin once verified by pharmacy     Chronic paroxysmal atrial fibrillation  - on apixaban, hold for now due to concern of GI bleed     CKD stage 3  - Baseline creatinine around 1.2-1.4. Creatinine 1.5 on admission, stable      BPH  -Previous CT scan showed enlarged prostate.  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  Had peripheral neuropathy.  Treated with rituximab, completed in 2012  - Most recent labs were stable when last checked by oncology.    - Continue gabapentin     Tobacco use disorder  - Patient continues to smoke at least 3 cigarettes/day  - Smoking cessation is strongly advised     Generalized weakness  - Multifactorial due to acute illness, physical deconditioning  - PT/OT consulted.  TCU recommended.  Patient and wife decided to return to home with outpatient PT      He is otherwise stable.  Recheck hemoglobin tomorrow to make sure remained stable  Discontinue Eliquis on discharge  Discontinue oral vancomycin  If remains stable home tomorrow.     DVT Prophylaxis: Pneumatic Compression Devices  Code Status: Full Code    Disposition: Expected discharge tomorrow if remains stable    Aniket Bowden MD, MD  Text Page  (7am - 6pm)    Interval History   Patient seen and evaluated in his room today, feeling better, offers no complaints, he is on baseline oxygen of 2 L, denies any chest pain shortness of breath headache dizziness or lightheadedness no fever chills or cough.  Last evening have melanotic  stool x 1, no bobby blood, no hematemesis no dizziness lightheadedness.  He does have history of orthostatic hypotension.    No other significant event overnight    -Data reviewed today: I reviewed all new labs and imaging results over the last 24 hours. I personally reviewed the chest x-ray image(s)  and the head CT image(s) reviewed and agree with the report below..    Physical Exam   Temp: 99.2  F (37.3  C) Temp src: Axillary BP: 123/72 Pulse: 52   Resp: 20 SpO2: 92 % O2 Device: Nasal cannula Oxygen Delivery: 2 LPM  Vitals:    01/09/24 0000   Weight: 62.1 kg (136 lb 14.5 oz)     Vital Signs with Ranges  Temp:  [97.5  F (36.4  C)-99.2  F (37.3  C)] 99.2  F (37.3  C)  Pulse:  [52-88] 52  Resp:  [18-22] 20  BP: (123-127)/(58-72) 123/72  SpO2:  [92 %-97 %] 92 %  I/O last 3 completed shifts:  In: 240 [P.O.:240]  Out: 850 [Urine:850]    Constitutional: awake, alert, cooperative, no apparent distress, and appears stated age  Eyes: Lids and lashes normal, pupils equal, round and reactive to light, extra ocular muscles intact, sclera clear, conjunctiva normal  Respiratory: No increased work of breathing, good air exchange, clear to auscultation bilaterally, no crackles or wheezing  Cardiovascular: Normal apical impulse, regular rate and rhythm, normal S1 and S2, no S3 or S4, and no murmur noted  GI: No scars, normal bowel sounds, soft, non-distended, non-tender, no masses palpated, no hepatosplenomegally  Musculoskeletal: no lower extremity pitting edema present  Neurologic: No focal deficit    Medications        famotidine  20 mg Intravenous Q12H    fludrocortisone  0.1 mg Oral Daily    fluticasone-vilanterol  1 puff Inhalation Daily    multivitamin, therapeutic  1 tablet Oral Daily    sodium chloride (PF)  3 mL Intracatheter Q8H    tamsulosin  0.4 mg Oral Daily    umeclidinium  1 puff Inhalation Daily    vancomycin  125 mg Oral 4x Daily       Data   Recent Labs   Lab 01/10/24  0538 01/09/24  1800 01/09/24  1136  01/09/24  0637 01/08/24 1952 01/08/24  1755   WBC 5.3  --   --  6.7  --  10.2   HGB 8.8* 9.0* 9.5* 9.0*   < > 8.7*   MCV 84  --   --  84  --  84   *  --   --  126*  --  142*   INR  --   --   --   --   --  1.53*     --   --  140  --  139   POTASSIUM 4.0  --   --  4.7  --  4.5   CHLORIDE 107  --   --  107  --  105   CO2 24  --   --  28  --  26   BUN 19.4  --   --  23.8*  --  26.1*   CR 1.00  --   --  1.25*  --  1.50*   ANIONGAP 7  --   --  5*  --  8   AMRITA 8.6  --   --  8.3  --  8.4   GLC 79  --   --  100*  --  115*   ALBUMIN  --   --   --   --   --  2.9*   PROTTOTAL  --   --   --   --   --  5.5*   BILITOTAL  --   --   --   --   --  0.3   ALKPHOS  --   --   --   --   --  90   ALT  --   --   --   --   --  <5   AST  --   --   --   --   --  7    < > = values in this interval not displayed.       No results found for this or any previous visit (from the past 24 hour(s)).

## 2024-01-11 NOTE — PROGRESS NOTES
Occupational Therapy Discharge Summary    Reason for therapy discharge:    Discharged to home with home therapy.    Progress towards therapy goal(s). See goals on Care Plan in [facility] electronic health record for goal details.  Goals partially met.  Barriers to achieving goals:   limited tolerance for therapy and discharge from facility.    Therapy recommendation(s):    Continued therapy is recommended.  Rationale/Recommendations:  Recommend continued OT at home to increase independence in ADLS and ability for caregivers to assist in daily cares.

## 2024-01-11 NOTE — PROGRESS NOTES
Care Management Discharge Note    Discharge Date: 01/11/2024       Discharge Disposition: Home, Home Care    Discharge Services: None    Discharge DME:  oxygen    Discharge Transportation: family or friend will provide    Private pay costs discussed: Not applicable    Does the patient's insurance plan have a 3 day qualifying hospital stay waiver?  No    PAS Confirmation Code:    Patient/family educated on Medicare website which has current facility and service quality ratings: no    Education Provided on the Discharge Plan:    Persons Notified of Discharge Plans: bedside RN, [facility]  Patient/Family in Agreement with the Plan: other (see comments) (per spouse, pt will want to discharge home and not go to TCU)    Handoff Referral Completed: Yes    Additional Information:  Patient has discharge orders for home with [facility] RN, PT/OT and follow-up scheduled with Dr. Bishop on 1/24 at 9am.  Called pt's spouse Roberta and informed her of discharge plan of home with home care services RN, PT/OT provided by [facility] and informed her of pt's PCP appt date/time.  Discussed home oxygen.  Roberta shared that pt uses an [employer] at noc only.  Discussed pt has been on O2@2l continuous during hospital stay. Discussed patient discharging with home oxygen to allow continuous use and pt would have oxygen delivered to his bedside prior to discharge.  Messaged [place] Zafar with [facility] and discussed pt's need for additional oxygen at discharge.  [place] was informed that walk test and O2 orders will be placed.  [place] asked for a call to [phone] when everything is in.  Informed bedside RN to call [facility].     Per Roberta, she and her son will transport patient home.  Messaged Dr. Bowden to enter home oxygen orders.    Elva Limon, CECILIA Limon RN, BSN, OCN   Inpatient Care Coordination [address]  Office: [phone]

## 2024-01-11 NOTE — DISCHARGE SUMMARY
[facility]    Discharge Summary  Hospitalist    Date of Admission:  1/8/2024  Date of Discharge:  1/11/2024  Discharging Provider: Aniket Bowden MD, MD  Date of Service (when I saw the patient): 01/11/24    Discharge Diagnoses   Please refer below    History of Present Illness   Hermilo Velasquez is an 89 year old male who presented with fall    Hospital Course   Hermilo Velasquez is a 89 year old male with h/o recurrent C. difficile colitis, adenocarcinoma of the right lung, COPD/emphysema with chronic hypoxic respiratory failure, on 2L of oxygen, h/o DVT/PE, paroxysmal atrial fibrillation, hypertension, CKD stage III, dyslipidemia, BPH, non-Hodgkin lymphoma, GERD, who presented on 1/8/2024 with episode of mechanical fall at home        He was hospitalized previously from 11/25 - 12/1/2023 due to recurrent C. difficile colitis for which he remains on p.o. vancomycin.  At that time he was also found to have small PE for which he was started on apixaban.  He has known history of GI bleed in past.   His last EGD was 8/23/2022 with that showed small hiatal hernia, normal esophagus, normal stomach, normal duodenum.   Last colonoscopies 4/10/2022 and 10/2020 that showed multiple nonbleeding colonic angiodysplastic lesions that were treated with APC, diverticulosis of sigmoid colon and descending colon.  Video capsule study was recommended, unknown if this was pursued or not.     His hemoglobin was 10.4 on 11/30/2023 and is now down to 8.5 with Hemoccult positive.           Final discharge diagnoses and hospital course     Acute on chronic normocytic anemia, suspect GI bleed  -Hemoglobin 8.5, down from 10.4.  Hemoccult positive.  Was started on apixaban 6 weeks ago due to small PE.  Has known history of GI bleed, previously anticoagulation was discontinued because of having GI bleed at the time.  -Most recent EGD and colonoscopies reviewed, most likely has AVM and bleeding from there.  Continue  with IV Pepcid, as the patient is allergic to PPI.  No active bleeding at this time.  I will continue hold apixaban at this time.     We may need to discuss with the patient and the family about the risk and benefit of continued anticoagulation.  In my opinion for this subsegmental PE, we can hold off onto the anticoagulation at this time.       -IV Pepcid.  Has multiple PPIs listed in allergies.  If he is actively bleeding, source most likely to be in colon with previous findings of angioectasias and diverticulosis.  Hemoglobin has been stable at this time,remained stable around 8.5.    I had a long discussion with the patient, and patient wife Roberta over the phone, discussed risk and benefit of continuing anticoagulation.  She told me that she  does not like the idea of him being on anticoagulation and she agree on stopping the Eliquis at this time.  Will discontinue Eliquis.      At this time he is doing well, no active bleeding, and hemoglobin is stable and improved to 9.5 at this time.  He will be discharged back home in stable and improved condition with home health.        Mechanical fall at home   Orthostatic hypotension: Improved  -Confirmed with patient's wife, he did not have syncope.  He had mechanical fall twice on day of admission.  First episode was right after he woke up in the morning and stand up.  Second episode was when he was sitting in chair and checking his mail, male fell down and he bent to pick it up, lost his balance and fell.  No loss of consciousness  -Head CT showed no acute changes  -Overall very frail with multiple health issues  -Most recent echo was 1/3/2024 and showed normal LV systolic function with an EF of 55-60% RV was not well-visualized but appeared to be dilated with reduced function and prosthetic aortic valve well-visualized.  Overall There was no significant change from before   -He was started on IV fluids normal saline at 50 mill per hour  -Monitor on  telemetry  -Orthostatic vital signs, does show significant orthostatic hypotension, compression stocking applied.  -PT/OT evaluation  - consulted.  -TCU has been recommended during previous admissions but patient's wife had always preferred for him to return home  PT OT evaluated him and recommending TCU, discussed with the patient and the patient's wife they want to take him home.  I agree with home health PT OT and RN.     With regard to his orthostatic hypotension he is already on Florinef, I will apply thigh-high compression stockings.  Fluid on changing position.  His blood pressure is stable, hemoglobin stable discontinue IV fluids on 1/10/2024.  Recheck orthostatic showed no significant drop in the blood pressure.  If he have any orthostatic hypotension in the future, consider midodrine.  Continue with the Florinef and compression stocking at this time.     Recurrent C. difficile procto-colitis  -Most recently hospitalized from 11/25/2023 to 12/1/2023 due to recurrent C. difficile proctocolitis.    - ID was consulted and recommended 4 times daily p.o. vancomycin until further evaluation with Keerthi BLAKE for fecal microbiota transplantation.    -Multiple referrals have been placed for this but patient has not yet followed up for this.    Patient has no active diarrhea at this time, I do recommend prolonged tapering dose of vancomycin at that time, last episode was in November 25, 2023.  He does not need to be on vancomycin 4 times a day at this time.  As he was not taking at home either, he is not currently on antibiotics.  Will discontinue the oral vancomycin        Subsegmental left lower lobe pulmonary embolism, 11/25/2023  - Noted on CT PE on 11/25.  Was started on apixaban, CT scan reviewed, few scattered small filling defects in the subsegmental pulmonary arteries more prominent in the left lower lobe, the radiologist think that represent small pulm emboli  -Patient is a high risk for GI  bleeding, and he bleeds again, I think we can hold off on anticoagulation in his case.  I discussed with the patient, patient's wife Roberta over the phone, she is agree with the stopping the Eliquis at this time.  Patient told me that his [facility] does not [facility] to be on anticoagulation either.  Will discontinue Eliquis at this time     Adenocarcinoma of the right lung, diagnosed 2019, s/p wedge resection  Right lower lobe consolidation-concerning for recurrence of malignancy   Bilateral pulmonary nodules  - follows with [facility].  Was diagnosed with stage I adenocarcinoma of right lower lobe in 2019 and is s/p limited thoracotomy with wedge resection in March 2019.    - CT chest on 9/11 - Multiple new pulmonary nodules. 1.8 cm nodule in the RLL WAS concerning for recurrent malignancy. Multiple bilateral new pulmonary nodules were also concerning for metastatic disease   - PET scan 9/22 showed moderate FDG activity in the left seventh rib anterolaterally, nonspecific and could represent a lymphoma/myeloma vs the sequelae of trauma. Small LLL pulmonary nodules too small to characterize.  FDG activity in the right lower lobe near the wedge resection, most likely inflammation.  Continued CT or PET/CT surveillance recommended. FDG avid soft tissue nodule in the right tongue base may represent a synchronous squamous cell carcinoma.  Plan was to repeat CT chest in 2 months and refer to ENT.  Has not been seen by ENT yet.   - CT PE on 11/25 showed RLL patchy consolidative pulmonary opacities, worrisome for local recurrence vs infection, Multiple pulmonary nodules most prominent in the left lower lobe, slightly increased in size as compared to 09/11/2023, moderate right pleural effusion  -Most recently saw oncology on 12/13/2023.  He was not felt to be a candidate for chemotherapy.  Oncology planning to obtain PET scan and IR consult for biopsy of nodule.  Per wife biopsy was scheduled for 1/9        FDG avid  soft tissue nodule in right tongue base - concerning for squamous cell carcinoma  - Heme-onc had made referral to ENT as outpatient.  Continue to follow-up with ENT.  Per patient's wife, this is scheduled for 1/11     COPD with emphysema  Chronic hypoxic respiratory failure on oxygen 2 L  -Apparently saturations were low at home but have been at baseline since his arrival to ER.    -Chest x-ray shows stable central pulmonary vasculature, masslike opacity/consolidation in right lower lobe slightly increased from before, interstitial infiltrates.  -Chest x-ray findings appear chronic.  No fever, no increase in cough, no leukocytosis  -Monitor for now.  He is at increased risk for pneumonias.  Not starting any antibiotics until clear evidence of infection due to recurrent C. difficile  - Continue supplemental oxygen 2 L/min  - Continue home inhaler     Severe aortic stenosis, s/p bioprosthetic aortic valve replacement  Dyslipidemia  - Continue atorvastatin once verified by pharmacy     Chronic paroxysmal atrial fibrillation  - on apixaban, hold for now due to concern of GI bleed     CKD stage 3  - Baseline creatinine around 1.2-1.4. Creatinine 1.5 on admission, stable      BPH  -Previous CT scan showed enlarged prostate.  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  Had peripheral neuropathy.  Treated with rituximab, completed in 2012  - Most recent labs were stable when last checked by oncology.    - Continue gabapentin     Tobacco use disorder  - Patient continues to smoke at least 3 cigarettes/day  - Smoking cessation is strongly advised     Generalized weakness  Severe Protein-Calorie Malnutrition  - Multifactorial due to acute illness, physical deconditioning  - PT/OT consulted.  TCU recommended.  Patient and wife decided to return to home with outpatient PT   History of weight loss, was eval by the nutritionist/dietitian.   Appreciate their input.  Encourage oral intake and supplements.    DVT Prophylaxis: Pneumatic Compression Devices  Code Status: Full Code     Disposition: Patient discharged home with home health RN PT and OT.    Aniket Bowden MD, MD    Significant Results and Procedures       Pending Results   These results will be followed up by PCP  Unresulted Labs Ordered in the Past 30 Days of this Admission       No orders found from 12/9/2023 to 1/9/2024.            Code Status   Full Code       Primary Care Physician   Karson Bishop    Physical Exam   Temp: 97.3  F (36.3  C) Temp src: Oral BP: 131/79 Pulse: 94   Resp: 18 SpO2: 94 % O2 Device: Nasal cannula Oxygen Delivery: 2 LPM  Vitals:    01/09/24 0000   Weight: 62.1 kg (136 lb 14.5 oz)     Vital Signs with Ranges  Temp:  [97.3  F (36.3  C)-97.7  F (36.5  C)] 97.3  F (36.3  C)  Pulse:  [88-95] 94  Resp:  [17-18] 18  BP: (113-142)/(68-86) 131/79  SpO2:  [94 %-95 %] 94 %  I/O last 3 completed shifts:  In: 480 [P.O.:480]  Out: 950 [Urine:950]    Constitutional: awake, alert, cooperative, no apparent distress, and appears stated age  Eyes: Lids and lashes normal, pupils equal, round and reactive to light, extra ocular muscles intact, sclera clear, conjunctiva normal  Respiratory: No increased work of breathing, good air exchange, clear to auscultation bilaterally, no crackles or wheezing  Cardiovascular: Normal apical impulse, regular rate and rhythm, normal S1 and S2, no S3 or S4, and no murmur noted  GI: No scars, normal bowel sounds, soft, non-distended, non-tender, no masses palpated, no hepatosplenomegally  Neurologic: No focal deficit    Discharge Disposition   Discharged to home home health  Condition at discharge: Stable    Consultations This Hospital Stay   PHYSICAL THERAPY ADULT IP CONSULT  OCCUPATIONAL THERAPY ADULT IP CONSULT  CARE MANAGEMENT / SOCIAL WORK IP CONSULT  GASTROENTEROLOGY IP CONSULT    Time Spent on this Encounter   IAniket MD, personally  saw the patient today and spent greater than 30 minutes discharging this patient.    Discharge Orders      Medication Therapy Management Referral      Home Care Referral      Primary Care - Care Coordination Referral      Reason for your hospital stay    Mechanical fall, orthostatic hypotension and possible GI bleed     Follow-up and recommended labs and tests     Follow up with primary care provider, Karson Bishop, within 7 days for hospital follow- up.  The following labs/tests are recommended: cbc,bmp.     Activity    Your activity upon discharge: activity as tolerated     Oxygen Adult/Peds    Oxygen Documentation  I certify that this patient, Hermilo Velasquez has been under my care (or a nurse practitioner or physican's assistant working with me). This is the face-to-face encounter for oxygen medical necessity.      At the time of this encounter, I have reviewed the qualifying testing and have determined that supplemental oxygen is reasonable and necessary and is expected to improve the patient's condition in a home setting.       Patient has continued oxygen desaturation due to Pulmonary Embolism Chronic I27.82  Pulmonary Neoplasm C34.90.    If portability is ordered, is the patient mobile within the home? yes     Diet    Follow this diet upon discharge: Orders Placed This Encounter      Room Service      Regular Diet Adult     Discharge Medications   Current Discharge Medication List        START taking these medications    Details   !! famotidine (PEPCID) 40 MG tablet Take 1 tablet (40 mg) by mouth 2 times daily  Qty: 60 tablet, Refills: 0    Associated Diagnoses: Upper GI bleed; Gastroesophageal reflux disease without esophagitis       !! - Potential duplicate medications found. Please discuss with provider.        CONTINUE these medications which have NOT CHANGED    Details   acetaminophen (TYLENOL) 500 MG tablet Take 1,000 mg by mouth 3 times daily as needed      albuterol (PROAIR HFA/PROVENTIL  HFA/VENTOLIN HFA) 108 (90 Base) MCG/ACT inhaler Inhale 2 puffs into the lungs every 6 hours as needed      atorvastatin (LIPITOR) 10 MG tablet Take 1 tablet (10 mg) by mouth daily  Qty: 90 tablet, Refills: 0    Associated Diagnoses: Hyperlipidemia LDL goal <100      DULoxetine (CYMBALTA) 60 MG capsule Take 60 mg by mouth daily      !! famotidine (PEPCID) 40 MG tablet TAKE ONE TABLET BY MOUTH TWICE DAILY  Qty: 180 tablet, Refills: 0    Associated Diagnoses: Gastroesophageal reflux disease without esophagitis      ferrous sulfate (FE TABS) 325 (65 Fe) MG EC tablet Take 1 tablet (325 mg) by mouth 2 times daily      fludrocortisone (FLORINEF) 0.1 MG tablet Take 1 tablet (0.1 mg) by mouth daily  Qty: 90 tablet, Refills: 3    Associated Diagnoses: Orthostatic hypotension      gabapentin (NEURONTIN) 300 MG capsule Take 2 capsules (600 mg) by mouth At Bedtime For neuropathy pain    Associated Diagnoses: Peripheral polyneuropathy      hydrOXYzine HCl (ATARAX) 25 MG tablet Take 2 tablets (50 mg) by mouth every 6 hours as needed  Qty: 360 tablet, Refills: 0    Associated Diagnoses: Chronic pruritus      tamsulosin (FLOMAX) 0.4 MG capsule Take 1 capsule (0.4 mg) by mouth daily    Associated Diagnoses: Benign prostatic hyperplasia, unspecified whether lower urinary tract symptoms present      tiotropium (SPIRIVA) 18 MCG inhaled capsule Inhale 18 mcg into the lungs daily      fluticasone-vilanterol (BREO ELLIPTA) 200-25 MCG/ACT inhaler Inhale 1 puff into the lungs daily       !! - Potential duplicate medications found. Please discuss with provider.        STOP taking these medications       albuterol (PROVENTIL) (2.5 MG/3ML) 0.083% neb solution Comments:   Reason for Stopping:         vancomycin (VANCOCIN) 125 MG capsule Comments:   Reason for Stopping:             Allergies   Allergies   Allergen Reactions    Omeprazole Itching    Pantoprazole Itching    Prevacid [Lansoprazole] Itching    Lasix [Furosemide] Rash    Lidocaine  "Blisters and Rash     Allergy to lidocaine ointment  Allergy to lidocaine ointment      Penicillin G Rash    Penicillins Rash     \"broke out from injection\" 60 yrs ago  Tolerates cephalosporins     Data   Most Recent 3 CBC's:  Recent Labs   Lab Test 01/11/24  0745 01/10/24  1932 01/10/24  0538 01/09/24  1136 01/09/24  0637 01/08/24 1952 01/08/24  1755   WBC  --   --  5.3  --  6.7  --  10.2   HGB 9.5* 9.4* 8.8*   < > 9.0*   < > 8.7*   MCV  --   --  84  --  84  --  84   PLT  --   --  120*  --  126*  --  142*    < > = values in this interval not displayed.      Most Recent 3 BMP's:  Recent Labs   Lab Test 01/10/24  0538 01/09/24  0637 01/08/24  1755    140 139   POTASSIUM 4.0 4.7 4.5   CHLORIDE 107 107 105   CO2 24 28 26   BUN 19.4 23.8* 26.1*   CR 1.00 1.25* 1.50*   ANIONGAP 7 5* 8   AMRITA 8.6 8.3 8.4   GLC 79 100* 115*     Most Recent 2 LFT's:  Recent Labs   Lab Test 01/08/24 1755 11/25/23  2301   AST 7 12   ALT <5 7   ALKPHOS 90 126   BILITOTAL 0.3 0.6     Most Recent INR's and Anticoagulation Dosing History:  Anticoagulation Dose History  More data exists         Latest Ref Rng & Units 8/17/2020 10/23/2020 5/8/2022 8/23/2022 10/9/2022 9/12/2023 1/8/2024   Recent Dosing and Labs   INR 0.85 - 1.15 1.21  1.39  1.99  1.65  1.19  1.15  1.53      Most Recent 3 Troponin's:  Recent Labs   Lab Test 04/14/21  0506 04/14/21  0039 04/13/21 2038   TROPI 0.513* 0.529* 0.331*     Most Recent Cholesterol Panel:  Recent Labs   Lab Test 11/30/21  1123   CHOL 138   LDL 71   HDL 55   TRIG 58     Most Recent 6 Bacteria Isolates From Any Culture (See EPIC Reports for Culture Details):  Recent Labs   Lab Test 04/14/21  0815 04/13/21 2054 04/13/21  2038 09/13/20  1329 09/13/20  1256 08/15/20  1451   CULT Canceled, Test credited  >10 Squamous epithelial cells/low power field indicates oral contamination. Please   recollect.  *  Notification of test cancellation was given to  Elva Ospina RN [id number] 4/14/20 by AM   No growth No " growth No growth No growth No growth  No growth     Most Recent TSH, T4 and A1c Labs:  Recent Labs   Lab Test 10/26/23  1733 11/06/22  1007 09/08/21  1459 07/31/18  1104   TSH 3.54 4.36*   < > 2.88   T4  --  0.97  --   --    A1C  --   --   --  5.8*    < > = values in this interval not displayed.     Results for orders placed or performed during the hospital encounter of 01/08/24   XR Chest Port 1 View    Narrative    EXAM: XR CHEST PORT 1 VIEW  LOCATION: [facility]  DATE: 1/8/2024    INDICATION: SOB.  COMPARISON: 11/26/2023 chest x-ray and CT.      Impression    IMPRESSION: Sternotomy. Heart size upper limits of normal and slightly increased since previous examination. Prominence of the central pulmonary vasculature stable. Masslike opacity or consolidation right lower garcia/infrahilar region slightly increased.   Coarse infiltrates scattered in the right lung slightly increased in the right upper lung and decreased in the right lower lung laterally. Interstitial infiltrates left mid lower lung, decreased in midlung and increased in the left lung medially.   Superior subluxation right femoral head chronic rotator cuff arthropathy. Small right pleural effusion stable.   Head CT w/o contrast    Narrative    EXAM: CT HEAD W/O CONTRAST  LOCATION: [facility]  DATE: 1/8/2024    INDICATION: Head injury.  COMPARISON: CT 11/26/2023.  TECHNIQUE: Routine CT Head without IV contrast. Multiplanar reformats. Dose reduction techniques were used.    FINDINGS:  INTRACRANIAL CONTENTS: No intracranial hemorrhage, extraaxial collection, or mass effect.  No CT evidence of acute infarct. Mild presumed chronic small vessel ischemic changes. Mild to moderate generalized volume loss. No hydrocephalus.     VISUALIZED ORBITS/SINUSES/MASTOIDS: No intraorbital abnormality. Mild mucosal thickening scattered about the paranasal sinuses. No middle ear or mastoid effusion.    BONES/SOFT TISSUES:  No acute abnormality.      Impression    IMPRESSION:  1.  No CT evidence for acute intracranial process.  2.  Mild chronic microvascular ischemic changes as above.     *Note: Due to a large number of results and/or encounters for the requested time period, some results have not been displayed. A complete set of results can be found in Results Review.     Most Recent 3 CBC's:  Recent Labs   Lab Test 01/11/24  0745 01/10/24  1932 01/10/24  0538 01/09/24  1136 01/09/24  0637 01/08/24 1952 01/08/24  1755   WBC  --   --  5.3  --  6.7  --  10.2   HGB 9.5* 9.4* 8.8*   < > 9.0*   < > 8.7*   MCV  --   --  84  --  84  --  84   PLT  --   --  120*  --  126*  --  142*    < > = values in this interval not displayed.     Most Recent 3 BMP's:  Recent Labs   Lab Test 01/10/24  0538 01/09/24  0637 01/08/24  1755    140 139   POTASSIUM 4.0 4.7 4.5   CHLORIDE 107 107 105   CO2 24 28 26   BUN 19.4 23.8* 26.1*   CR 1.00 1.25* 1.50*   ANIONGAP 7 5* 8   AMRITA 8.6 8.3 8.4   GLC 79 100* 115*     Most Recent 2 LFT's:  Recent Labs   Lab Test 01/08/24 1755 11/25/23  2301   AST 7 12   ALT <5 7   ALKPHOS 90 126   BILITOTAL 0.3 0.6     Most Recent 3 INR's:  Recent Labs   Lab Test 01/08/24 1755 09/12/23  1628 10/09/22  1353   INR 1.53* 1.15 1.19*

## 2024-01-11 NOTE — PLAN OF CARE
Orientation: A&O x2, disoriented to place and situation, pt has episodes of intermittent confusion.  [place]  Activity: A1 w GBW, ambulated to bathroom x2 this shift  Diet/BS Checks: Regular diet  Tele:  N/A  IV Access/Drains: New PIV placed this shift SL  Pain Management:  Denies pain this shift  Abnormal VS/Results: VSS on 2L via NC. On q12hr Hgb check, last results= 9.4  Bowel/Bladder: Continent of B/B, pt using urinal at bedside w good UOP. No BM this shift  Skin/Wounds: Scattered bruising, redness blanchable on sacrum/coccyx  Consults:  GI, SW, PT/OT  D/C Disposition: Discharge pending  Other Info:

## 2024-01-11 NOTE — PROGRESS NOTES
Patient has been assessed for Home Oxygen needs. Oxygen readings:    *Pulse oximetry (SpO2) = 94% on room air at rest while awake.    *SpO2 improved to 94% on 1liters/minute at rest.    *SpO2 = 88% on room air during activity/with exercise.    *SpO2 improved to 92% on 2liters/minute during activity/with exercise.

## 2024-01-11 NOTE — PLAN OF CARE
Goal Outcome Evaluation:      [id number]  Orientation: A&O x2, disoriented to place and situation. [place], no available hearing aids. Pt can be confused at times. Pt rambles at times.  Easy to reorient  Mobility: Ax1 w GBW, ambulated to bathroom  this shift  Pain Management: Denies pain  Tele : discontinued during shift  Diet: regular  Bowel/Bladder: Continent of urine. Uses bedside urinal and ambulates to bathroom.  Up in chair today  Abnormal Lab/Assessments: VSS on 2L O2 nasal cannula. Hgb 8,8   Drain/Device/Wound: R PIV SL, discont fluids during shift.   Consults: GI, SW, PT , OT  D/C Day/Goals/Place: discharge home pending tomorrow   Shift Note:  Hgb lab draw Q12hrs. Denies SOB, nausea. Ortho Bp negative during shift

## 2024-01-11 NOTE — PROGRESS NOTES
Hemoglobn stable  GI will sign off  Please call with any GI concerns    [facility]  [phone]

## 2024-01-11 NOTE — PROGRESS NOTES
Patient discharged at 4:34 PM to Discharged to home  IV was discontinued. Pain at time of discharge was 0/10. Belongings returned to patient.  Discharge instructions and medications reviewed with patient.  Patient verbalized understanding and all questions were answered.  Prescriptions given to patient.  At time of discharge, patient condition was stable and left the unit at 1630 escorted by wife, son will drive pt and wife home..

## 2024-01-11 NOTE — PROGRESS NOTES
Oxygen Documentation  I certify that this patient, Hermilo Velasquez has been under my care (or a nurse practitioner or physican's assistant working with me). This is the face-to-face encounter for oxygen medical necessity.      At the time of this encounter, I have reviewed the qualifying testing and have determined that supplemental oxygen is reasonable and necessary and is expected to improve the patient's condition in a home setting.       Patient has continued oxygen desaturation due to Pulmonary Embolism Chronic I27.82  Pulmonary Neoplasm C34.90.    If portability is ordered, is the patient mobile within the home? yes

## 2024-01-12 NOTE — TELEPHONE ENCOUNTER
Home Care is calling regarding an established patient with [facility].       Requesting orders from: Karson Bishop  Provider is following patient: No       Orders Requested    Skilled Nursing  Request for delay in care   Delay start to date: 01/16/24 due to Per patient request      Information was gathered and will be sent to provider for review.  RN will contact Home Care with information after provider review.  Confirmed ok to leave a detailed message with call back.  Contact information confirmed and updated as needed.    Iraida Galindo RN

## 2024-01-12 NOTE — LETTER
[facility] CARE COORDINATION  [address]  [facility] [address]    January 12, 2024    Hermilo Velasquez  [address]  [facility] [address]      Dear Hermilo and Roberta,    I am a clinic care coordinator who works with Karson Bishop MD with the [facility]. I wanted to thank you for spending the time to talk with me.  Below is a description of clinic care coordination and how I can further assist you.       The clinic care coordination team is made up of a registered nurse, , financial resource worker and community health worker who understand the health care system. The goal of clinic care coordination is to help you manage your health and improve access to the health care system. Our team works alongside your provider to assist you in determining your health and social needs. We can help you obtain health care and community resources, providing you with necessary information and education. We can work with you through any barriers and develop a care plan that helps coordinate and strengthen the communication between you and your care team.  Our services are voluntary and are offered without charge to you personally.    Please feel free to contact me with any questions or concerns regarding care coordination and what we can offer.      We are focused on providing you with the highest-quality healthcare experience possible.    Sincerely,      Maria C Bustillo RN, BSN, PHN  Primary Care / Care Coordinator   [facility]  E-mail [email or web address]   [phone]

## 2024-01-12 NOTE — PLAN OF CARE
Physical Therapy Discharge Summary    Reason for therapy discharge:    Discharged to home with home therapy.    Progress towards therapy goal(s). See goals on Care Plan in [facility] electronic health record for goal details.  Goals partially met.  Barriers to achieving goals:   discharge from facility.    Therapy recommendation(s):    Continued therapy is recommended.  Rationale/Recommendations:  To further increase independence with mobility.

## 2024-01-12 NOTE — PROGRESS NOTES
"Clinic Care Coordination Contact  Clinic Care Coordination Contact  OUTREACH with Post Discharge Assessment    Referral Information:  Referral Source: IP Report    Primary Diagnosis: COPD    Chief Complaint   Patient presents with    Clinic Care Coordination - Initial    Clinic Care Coordination - Post Hospital      [place] Utilization:   [facility]  Clinic Utilization:  [facility]   Difficulty keeping appointments:: No  Compliance Concerns: No  No-Show Concerns: No  No PCP office visit in Past Year: No  Utilization      No Show Count (past year)  0             ED Visits  4             Hospital Admissions  4                    Current as of: 1/12/2024  7:18 AM              Clinical Concerns:  Current Medical Concerns:  Recent discharge from hospital; multiple hospitalizations in the past 3 months    Current Behavioral Concerns: Depression    Education Provided to patient:   RNCC called and spoke with patient/caregiver; introduced self, discussed role of Care Coordination and explained reason for call   Pain  Pain (GOAL):: No  Health Maintenance Reviewed: Due/Overdue   Health Maintenance Due   Topic Date Due    HF ACTION PLAN  Never done    RSV VACCINE (Pregnancy & 60+) (1 - 1-dose 60+ series) Never done    MEDICARE ANNUAL WELLNESS VISIT  Never done    DTAP/TDAP/TD IMMUNIZATION (1 - Tdap) 06/02/2012    MICROALBUMIN  08/23/2023    PHQ-2 (once per calendar year)  01/01/2024     Clinical Pathway: None    Admission:    Admission Date: 01/08/24   Reason for Admission: Mechanical fall at home, right Lung CA, 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 (Red Flag symptoms escalate to triage hotline per guidelines): Improved  Do you " feel your condition is stable enough to be safe at home until your provider visit?: Yes  Does the patient have their discharge instructions? : Yes  Does the patient have questions regarding their discharge instructions? : No  Were you started on any new medications or were there changes to any of your previous medications? : Yes  Does the patient have all of their medications?: Yes  Do you have questions regarding any of your medications? : No  Do you have all of your needed medical supplies or equipment (DME)?  (i.e. oxygen tank, CPAP, cane, etc.): Yes  Discharge follow-up appointment scheduled within 14 calendar days? : Yes  Discharge Follow Up Appointment Date: 01/24/24  Discharge Follow Up Appointment Scheduled with?: Primary Care Provider    Post-op (Clinicians Only)  Did the patient have surgery or a procedure: No  Fever: No  Chills: No  Eating & Drinking: eating and drinking without complaints/concerns  PO Intake: regular diet  Additional Symptoms:  (Denies)  Bowel Function: normal  Date of last BM: 01/11/24  Urinary Status: voiding without complaint/concerns    Medication Management:  Medication review status: Medications reviewed and no changes reported per patient.        Current Outpatient Medications   Medication    acetaminophen (TYLENOL) 500 MG tablet    albuterol (PROAIR HFA/PROVENTIL HFA/VENTOLIN HFA) 108 (90 Base) MCG/ACT inhaler    atorvastatin (LIPITOR) 10 MG tablet    DULoxetine (CYMBALTA) 60 MG capsule    famotidine (PEPCID) 40 MG tablet    famotidine (PEPCID) 40 MG tablet    ferrous sulfate (FE TABS) 325 (65 Fe) MG EC tablet    fludrocortisone (FLORINEF) 0.1 MG tablet    fluticasone-vilanterol (BREO ELLIPTA) 200-25 MCG/ACT inhaler    gabapentin (NEURONTIN) 300 MG capsule    hydrOXYzine HCl (ATARAX) 25 MG tablet    tamsulosin (FLOMAX) 0.4 MG capsule    tiotropium (SPIRIVA) 18 MCG inhaled capsule     No current facility-administered medications for this visit.     Functional Status:  Dependent  ADLs:: Independent, Ambulation-cane  Dependent IADLs:: Cleaning, Cooking, Laundry, Shopping, Meal Preparation, Transportation  Bed or wheelchair confined:: No  Mobility Status: Independent w/Device  Fallen 2 or more times in the past year?: Yes  Any fall with injury in the past year?: Yes    Living Situation:  Current living arrangement:: I live in a private home with spouse (Roberta, wife)  Type of residence:: Private home - stairs    Lifestyle & Psychosocial Needs:    Social Determinants of Health     Food Insecurity: No Food Insecurity (5/12/2020)    Hunger Vital Sign     Worried About Running Out of Food in the Last Year: Never true     Ran Out of Food in the Last Year: Never true   Depression: Not at risk (10/26/2023)    PHQ-2     PHQ-2 Score: 0   Housing Stability: Not on file   Tobacco Use: Medium Risk (12/7/2023)    Patient History     Smoking Tobacco Use: Former     Smokeless Tobacco Use: Never     Passive Exposure: Not on file   Financial Resource Strain: Low Risk  (5/12/2020)    Overall Financial Resource Strain (CARDIA)     Difficulty of Paying Living Expenses: Not hard at all   Alcohol Use: Not on file   Transportation Needs: No Transportation Needs (5/12/2020)    PRAPARE - Transportation     Lack of Transportation (Medical): No     Lack of Transportation (Non-Medical): No   Physical Activity: Inactive (5/12/2020)    Exercise Vital Sign     Days of Exercise per Week: 0 days     Minutes of Exercise per Session: 0 min   Interpersonal Safety: Not At Risk (5/12/2020)    Humiliation, Afraid, Rape, and Kick questionnaire     Fear of Current or Ex-Partner: No     Emotionally Abused: No     Physically Abused: No     Sexually Abused: No   Stress: No Stress Concern Present (5/12/2020)    [place] [employer] - Occupational Stress Questionnaire     Feeling of Stress : Not at all   Social Connections: Moderately Isolated (5/12/2020)    Social Connection and Isolation Panel [NHANES]     Frequency of  Communication with Friends and Family: Twice a week     Frequency of Social Gatherings with Friends and Family: Twice a week     Attends Caodaism Services: Never     Active Member of Clubs or Organizations: No     Attends Club or Organization Meetings: Never     Marital Status:      Diet:: Regular  Inadequate nutrition (GOAL):: No  Tube Feeding: No  Inadequate activity/exercise (GOAL):: No  Significant changes in sleep pattern (GOAL): No  Transportation means:: Regular car, Family     Caodaism or spiritual beliefs that impact treatment:: No  Mental health DX:: Yes  Mental health DX how managed:: Medication  Mental health management concern (GOAL):: No  Chemical Dependency Status: No Current Concerns  Chemical Dependency Management:  (NA)  Informal Support system:: Spouse (Roberta, wife)      Resources and Interventions:  Current Resources:   Skilled Home Care Services: Skilled Nursing, Physical Therapy, Occupational Therapy  Community Resources: Home Care  Supplies Currently Used at Home: Oxygen Tubing/Supplies  Equipment Currently Used at Home: walker, standard, walker, rolling, cane, straight  Employment Status: retired     Advance Care Plan/Directive  Advanced Care Plans/Directives on file:: Yes  Status of record:: On File and Validated  Type Advanced Care Plans/Directives: Advanced Directive - On File    Referrals Placed: None     Care Plan: NA     Future Appointments                In 1 week Karson Bishop MD [facility]    [facility]          Plan:   -Patient will contact the care team with questions, concerns, support needs   -Patient will use the clinic as a resource and understands (s)he can contact the [facility] with 24/7 after hours services available  -Care Coordinator will remain available as needed  -No further Care Coordination outreaches at this tong Bustillo RN, BSN, PHN  Primary Care / Care Coordinator    [facility]  E-mail [email or web address]   [phone]

## 2024-01-15 PROBLEM — R53.1 WEAKNESS: Status: ACTIVE | Noted: 2024-01-01

## 2024-01-15 PROBLEM — R19.7 DIARRHEA, UNSPECIFIED TYPE: Status: ACTIVE | Noted: 2024-01-01

## 2024-01-15 NOTE — TELEPHONE ENCOUNTER
CC: Patient's wife Roberta (C2C) calling on behalf of the patient.    Roberta states patient was recently discharged from the hospital but she is unable to care for him at home. States patient is currently in a reclining chair and she cannot get him up. States he had diarrhea this morning and cannot clean him up.     Patient was discharged from the hospital on Friday and was weak at that time but still able to move around. Wife noticed since yesterday worsening weakness, now patient is unable to get up.    Triaged per Epic protocol, writer advised that Roberta call 911 for EMS transfer to the emergency room. Roberta states she is concerned that patient won't want to go in the ambulance again and wondering if family can drive patient to emergency room themselves. States her son and grandson are on the way to their home to help. Writer reiterated recommendation that if patient is unable to get up and is very weak calling 911 would ensure patient is safely transported to the emergency room. Roberta expressed verbal understanding.    Signing encounter.    Luis Mccabe RN  [facility]    Reason for Disposition   SEVERE weakness (i.e., unable to walk or barely able to walk, requires support) and new-onset or worsening    Additional Information   Negative: SEVERE difficulty breathing (e.g., struggling for each breath, speaks in single words)   Negative: Shock suspected (e.g., cold/pale/clammy skin, too weak to stand, low BP, rapid pulse)   Negative: Difficult to awaken or acting confused (e.g., disoriented, slurred speech)   Negative: Fainted > 15 minutes ago and still feels too weak or dizzy to stand    Protocols used: Weakness (Generalized) and Fatigue-A-OH

## 2024-01-15 NOTE — ED TRIAGE NOTES
BIBA from home where family is unable to care for patient for generalized weakness, diarrhea, and c-diff diagnosis. Pt was unable to stand or ambulate for ems. Slurred speech which is patient's baseline per family. Denies pain. VSS en route, . 2L NC at baseline.

## 2024-01-15 NOTE — ED PROVIDER NOTES
History     Chief Complaint:  Generalized Weakness and Diarrhea       HPI   Hermilo Velasquez is a 89 year old male who presents with generalized weakness.  History from patient is limited, please see history from the patient's wife below.      Independent Historian:   Spouse/Partner - They report patient was discharged in the hospital on Friday.  He seemed okay at time of discharge.  Although over the last few days, the patient has been sleeping all the time.  He also had an episode of diarrhea last night.  He has been setting a lot of time in his chair and yesterday his wife could not help him up.  He also has been having some urinary urgency.  He has a history of C. difficile, had not had diarrhea in a while until the episode last night.  He ambulates with a walker and cane at baseline, and seem to be ambulating okay yesterday.        Review of External Notes:   Discharge summary from 1/11/2024 reviewed.  He presented with a fall.  Ultimately, TCU was recommended discharge disposition, but the patient and his family preferred for him to go home.  Arrangements were therefore made for home health upon discharge.      Medications:    No current outpatient medications on file.      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    Aortic stenosis     Atrial fibrillation (H)     COPD (chronic obstructive pulmonary disease) (H)     Deep vein thrombosis (H)     GERD (gastroesophageal reflux disease)     Heart murmur     Monoclonal gammopathy     Need for SBE (subacute bacterial endocarditis) prophylaxis     Neuropathy     Other and unspecified hyperlipidemia     Other malignant lymphomas     RBBB (right bundle branch block)     Severe sepsis with acute organ dysfunction (H) 11/16/2015    Unspecified hereditary and idiopathic peripheral neuropathy        Past Surgical History:    Past Surgical History:   Procedure Laterality Date    APPENDECTOMY       ARTHROPLASTY KNEE Right 7/25/2022    Procedure: RIGHT TOTAL KNEE ARTHROPLASTY;  Surgeon: Giuliano Deshpande MD;  Location:  OR    AS TOTAL KNEE ARTHROPLASTY      BACK SURGERY      ESOPHAGOSCOPY, GASTROSCOPY, DUODENOSCOPY (EGD), COMBINED N/A 11/28/2015    Procedure: COMBINED ESOPHAGOSCOPY, GASTROSCOPY, DUODENOSCOPY (EGD);  Surgeon: Danis Csatillo MD;  Location:  GI    ESOPHAGOSCOPY, GASTROSCOPY, DUODENOSCOPY (EGD), COMBINED N/A 7/26/2018    Procedure: COMBINED ESOPHAGOSCOPY, GASTROSCOPY, DUODENOSCOPY (EGD);;  Surgeon: Toby Dong DO;  Location:  GI    ESOPHAGOSCOPY, GASTROSCOPY, DUODENOSCOPY (EGD), COMBINED N/A 8/17/2020    Procedure: ESOPHAGOGASTRODUODENOSCOPY (EGD);  Surgeon: Herrera Henry MD;  Location:  GI    ESOPHAGOSCOPY, GASTROSCOPY, DUODENOSCOPY (EGD), COMBINED N/A 10/24/2020    Procedure: ESOPHAGOGASTRODUODENOSCOPY (EGD);  Surgeon: Vick Florentino MD;  Location:  GI    ESOPHAGOSCOPY, GASTROSCOPY, DUODENOSCOPY (EGD), COMBINED N/A 4/11/2022    Procedure: ESOPHAGOGASTRODUODENOSCOPY (EGD);  Surgeon: Vick Florentino MD;  Location:  GI    ESOPHAGOSCOPY, GASTROSCOPY, DUODENOSCOPY (EGD), COMBINED N/A 8/26/2022    Procedure: ESOPHAGOGASTRODUODENOSCOPY (EGD);  Surgeon: El Mcgovern MD;  Location:  GI    HERNIA REPAIR  2006    HERNIORRHAPHY VENTRAL  4/17/2013    Procedure: HERNIORRHAPHY VENTRAL;  VENTRAL HERNIA REPAIR WITH MESH;  Surgeon: Patel Guzman MD;  Location:  OR    KNEE SURGERY      arthroscopic right knee surgery     LOBECTOMY LUNG Right 3/26/2019    POSSIBLE LOBECTOMY LUNG per Dr. Vasques at [facility]    REPAIR LIGAMENT ANKLE  2/23/2012    Procedure:REPAIR LIGAMENT ANKLE; LEFT TARSAL TUNNEL RELEASE OF KNOT OF ARIEL RELEASE; Surgeon:SAUL PUENTE; Location: OR    REPLACE VALVE AORTIC N/A 9/3/2015    Procedure: REPLACE VALVE AORTIC;  Surgeon: Antonino Mitchell MD;  Location: [facility] OR    ROTATOR CUFF REPAIR RT/LT      bilateral    SPINE  "SURGERY      3 spine surgeries    THORACOTOMY, WEDGE RESECTION LUNG, COMBINED Right 3/26/2019    RIGHT THORACOTOMY, WEDGE RESECTION, RIGHT LOWER LOBE LUNG NODULE,;  Surgeon: Jose A Vasques MD;  Location: [facility] OR    TONSILLECTOMY      TURP          Physical Exam   Patient Vitals for the past 24 hrs:   BP Temp Temp src Pulse Resp SpO2 Height Weight   01/16/24 0025 117/74 97.1  F (36.2  C) Oral 98 18 97 % -- --   01/15/24 2347 133/89 -- -- 98 -- -- -- --   01/15/24 2255 -- -- -- -- -- 97 % -- --   01/15/24 2230 131/89 -- -- 88 -- 98 % -- --   01/15/24 2200 (!) 137/95 -- -- 95 18 99 % -- --   01/15/24 2130 (!) 140/85 -- -- 92 -- 99 % -- --   01/15/24 2100 (!) 151/89 -- -- 96 -- 99 % -- --   01/15/24 2030 -- -- -- -- -- 99 % -- --   01/15/24 2000 128/84 -- -- 103 -- 92 % -- --   01/15/24 1900 119/78 -- -- 96 -- 97 % -- --   01/15/24 1830 126/81 -- -- 78 -- 93 % -- --   01/15/24 1800 117/76 -- -- 111 -- 93 % -- --   01/15/24 1730 107/48 97.7  F (36.5  C) Temporal -- 20 90 % -- --   01/15/24 1650 103/65 98  F (36.7  C) Temporal 55 20 -- 1.778 m (5' 10\") 77.1 kg (170 lb)        Physical Exam  General: Laying on the ED bed  HEENT: Normocephalic, atraumatic  Cardiac: Warm and well perfused, regular tachycardia  Pulm: Breathing comfortably, no accessory muscle usage, no conversational dyspnea, very faint bilateral expiratory wheeze, pursed lip breathing  GI: Abdomen soft, nontender, no rigidity or guarding  MSK: No bony deformities  Skin: Warm and dry  Neuro: Moves all extremities, hard of hearing  Psych: Pleasant mood and affect      Emergency Department Course   ECG  ECG taken at 1827, ECG read at 1855  Atrial fibrillation, RBBB  No significant change as compared to prior, dated 11/25/23, now in A-fib when compared to EKG dated 1/8/2024.  Rate 97 bpm. KS interval * ms. QRS duration 132 ms. QT/QTc 390/495 ms. P-R-T axes * -16 69.     Imaging:  XR Chest 2 Views   Final Result   IMPRESSION: Unchanged right lower lobe " consolidation with bilateral interstitial opacities and small right pleural effusion. Unchanged borderline enlarged cardiomediastinal silhouette. Median sternotomy wires and aortic valve replacement.             Laboratory:  Labs Ordered and Resulted from Time of ED Arrival to Time of ED Departure   COMPREHENSIVE METABOLIC PANEL - Abnormal       Result Value    Sodium 140      Potassium 4.0      Carbon Dioxide (CO2) 30 (*)     Anion Gap 10      Urea Nitrogen 18.3      Creatinine 1.31 (*)     GFR Estimate 51 (*)     Calcium 9.6      Chloride 100      Glucose 155 (*)     Alkaline Phosphatase 102      AST 60 (*)     ALT 15      Protein Total 7.4      Albumin 3.6      Bilirubin Total 0.9     TROPONIN T, HIGH SENSITIVITY - Abnormal    Troponin T, High Sensitivity 67 (*)    ROUTINE UA WITH MICROSCOPIC REFLEX TO CULTURE - Abnormal    Color Urine Yellow      Appearance Urine Clear      Glucose Urine 30 (*)     Bilirubin Urine Negative      Ketones Urine Negative      Specific Gravity Urine 1.022      Blood Urine Moderate (*)     pH Urine 5.5      Protein Albumin Urine 50 (*)     Urobilinogen Urine Normal      Nitrite Urine Negative      Leukocyte Esterase Urine Negative      Mucus Urine Present (*)     RBC Urine 35 (*)     WBC Urine 1     NT PROBNP INPATIENT - Abnormal    N terminal Pro BNP Inpatient 24,255 (*)    CBC WITH PLATELETS AND DIFFERENTIAL - Abnormal    WBC Count 10.7      RBC Count 4.31 (*)     Hemoglobin 11.0 (*)     Hematocrit 35.7 (*)     MCV 83      MCH 25.5 (*)     MCHC 30.8 (*)     RDW 16.3 (*)     Platelet Count 164      % Neutrophils 84      % Lymphocytes 7      % Monocytes 9      % Eosinophils 0      % Basophils 0      % Immature Granulocytes 0      NRBCs per 100 WBC 0      Absolute Neutrophils 9.0 (*)     Absolute Lymphocytes 0.8      Absolute Monocytes 0.9      Absolute Eosinophils 0.0      Absolute Basophils 0.0      Absolute Immature Granulocytes 0.0      Absolute NRBCs 0.0     LIPASE - Normal     Lipase 13     LACTIC ACID WHOLE BLOOD - Normal    Lactic Acid 1.5     MAGNESIUM - Normal    Magnesium 1.7     TSH WITH FREE T4 REFLEX - Normal    TSH 3.89     TROPONIN T, HIGH SENSITIVITY   C. DIFFICILE TOXIN B PCR WITH REFLEX TO C. DIFFICILE ANTIGEN AND TOXINS A/B EIA        Procedures   None    Emergency Department Course & Assessments:             Interventions:  Medications   DULoxetine (CYMBALTA) DR capsule 60 mg (has no administration in time range)   gabapentin (NEURONTIN) capsule 600 mg (has no administration in time range)   tamsulosin (FLOMAX) capsule 0.4 mg (has no administration in time range)   fludrocortisone (FLORINEF) tablet 0.1 mg (has no administration in time range)   ondansetron (ZOFRAN ODT) ODT tab 4 mg (has no administration in time range)     Or   ondansetron (ZOFRAN) injection 4 mg (has no administration in time range)   vancomycin (VANCOCIN) capsule 125 mg (has no administration in time range)   lactated ringers infusion (0 mLs Intravenous ED/Periop/Clinic Infusing on Admission/transfer 1/15/24 4272)   acetaminophen (TYLENOL) tablet 650 mg (has no administration in time range)     Or   acetaminophen (TYLENOL) Suppository 650 mg (has no administration in time range)   melatonin tablet 1 mg (has no administration in time range)   ipratropium - albuterol 0.5 mg/2.5 mg/3 mL (DUONEB) neb solution 3 mL (has no administration in time range)   sodium chloride 0.9% BOLUS 1,000 mL (0 mLs Intravenous Stopped 1/15/24 2234)        Assessments:  1740 initial evaluation    Independent Interpretation (X-rays, CTs, rhythm strip):  Chest x-ray with pulmonary edema/pulmonary vascular congestion.  No lobar consolidation.    Consultations/Discussion of Management or Tests:  I discussed the patient with Dr. Aguirre, hospitalist, who accepts patient for admission       Social Determinants of Health affecting care:   None    Disposition:  The patient was admitted to the hospital under the care of Dr. Aguirre.      Impression & Plan    CMS Diagnoses: None      Medical Decision Makin-year-old male presents with generalized weakness, diarrhea last night.  History significant for multiple episodes of C. difficile colitis.  On history from the patient's wife, also some concerns for UTI.  Patient has no complaints for me, tachycardic on my initial evaluation which I suspect was from transferring to the bed given his bradycardic triage vitals.  Lab work shows BNP elevation.  Creatinine is 1.3, consistent with some recent prior baselines.  Urinalysis specifically shows no evidence of UTI.  No abdominal pain, and I do not think that advanced imaging of the abdomen with CT is warranted at this time.  Given the patient's functional decline, inability for his wife to care for him at home at the moment, plan is for admission to the hospitalist service for further care.      Diagnosis:    ICD-10-CM    1. Weakness  R53.1       2. Diarrhea, unspecified type  R19.7                  1/15/2024   Car Sykes MD King, Colin, MD  24 0043

## 2024-01-16 NOTE — ED NOTES
"[facility]  ED Nurse Handoff Report    ED Chief complaint: Generalized Weakness and Diarrhea      ED Diagnosis:   Final diagnoses:   Weakness   Diarrhea, unspecified type       Code Status: Hospitalist to discuss with patient    Allergies:   Allergies   Allergen Reactions    Omeprazole Itching    Pantoprazole Itching    Prevacid [Lansoprazole] Itching    Lasix [Furosemide] Rash    Lidocaine Blisters and Rash     Allergy to lidocaine ointment  Allergy to lidocaine ointment      Penicillin G Rash    Penicillins Rash     \"broke out from injection\" 60 yrs ago  Tolerates cephalosporins       Patient Story: BIBA from home where family is unable to care for patient for generalized weakness, diarrhea, and c-diff diagnosis   Focused Assessment:  Pt requires 2 assist to stand pivot, loose stool    Treatments and/or interventions provided:   Patient's response to treatments and/or interventions: Pending    To be done/followed up on inpatient unit:  C-diff stool collection    Does this patient have any cognitive concerns?: Forgetful and Disoriented to situation    Activity level - Baseline/Home:  Unknown  Activity Level - Current:   Stand with Assist    Patient's Preferred language: English   Needed?: No    Isolation: None  Infection: C-Diff (Clostridium Difficile) diagnosis  Patient tested for COVID 19 prior to admission: NO  Bariatric?: No    Vital Signs:   Vitals:    01/15/24 2100 01/15/24 2130 01/15/24 2200 01/15/24 2230   BP: (!) 151/89 (!) 140/85 (!) 137/95 131/89   BP Location:       Pulse: 96 92 95 88   Resp:   18    Temp:       TempSrc:       SpO2: 99% 99% 99%    Weight:       Height:           Cardiac Rhythm:     Was the PSS-3 completed:   Yes  What interventions are required if any?               Family Comments: pt's wife available by phone    For the majority of the shift this patient's behavior was Green.   Behavioral interventions performed were reorient.    ED NURSE PHONE NUMBER: " [phone]

## 2024-01-16 NOTE — UTILIZATION REVIEW
Admission Status; Secondary Review Determination       Under the authority of the Utilization Management Committee, the utilization review process indicated a secondary review on the above patient. The review outcome is based on review of the medical records, discussions with staff, and applying clinical experience noted on the date of the review.     (x) Inpatient Status Appropriate - This patient's medical care is consistent with medical management for inpatient care and reasonable inpatient medical practice.     RATIONALE FOR DETERMINATION    89-year-old male was admitted on January 15, 2024, for failure to thrive in the setting of diarrhea. He has a history of recurrent C. difficile colitis and had diarrhea along with decreased oral intake and generalized weakness. The plan included starting oral vancomycin until seen by [facility] for fecal transplant, conducting a follow-up C. difficile PCR, and providing IV hydration. Additionally, the patient's refusal to go to a care facility for continued care was noted, and follow-up with PT and social work was planned to address this issue. 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 Monitoring on telemetry and repeat troponin were planned for his cardiac condition.  The expected length of stay at the time of admission was more than 2 nights because of the severity of illness, intensity of service provided, and risk for adverse outcome. Inpatient admission is appropriate.     Kathy Lopez PA notified    This document was produced using voice recognition software       The information on this document is developed by the utilization review team in order  for the business office to ensure compliance. This only denotes the appropriateness of proper admission status and does not reflect the quality of care rendered.   The definitions of Inpatient Status and Observation Status used in making the determination above are those provided in the CMS Coverage Manual, Chapter 1 and Chapter 6, section 70.4.   Sincerely,   PORTIA ELLISON MD   System Medical Director   Utilization Management   [facility].

## 2024-01-16 NOTE — PROGRESS NOTES
Observation goals  PRIOR TO DISCHARGE        Comments: -diagnostic tests and consults completed and resulted-not met.   -vital signs normal or at patient baseline-not met.   Nurse to notify provider when observation goals have been met and patient is ready for discharge.

## 2024-01-16 NOTE — CONSULTS
"Palliative Care Consultation Note  [facility]      Patient: Hermilo Velasquez  Date of Admission:  1/15/2024    Requesting Clinician / Team: Kathy Lopez PA-C/Medicine  Reason for consult: Goals of care     Recommendations & Counseling     GOALS OF CARE:   Restorative without limits   Wife Roberta and son Mikal want restorative focus with hope that Hermilo's mental status and weakness will improve.   Roberta is unable to provide the level of care that Hermilo would need due to weakness and toileting needs.  Reviewed quality of life issues with family and their understanding of what Hermilo would want for his ongoing medical care if he had to live in a SNF? Would he want to extend his life with future hospitalizations and medical treatments. Roberta states that Hermilo had been at SNF in the past and did not seem to mind. Roberta states that \"he would want to live.\" Neither Roberta or Horacio doubted that Hermilo would not be agreeable to LTC and further medical interventions, although notes indicate her refused TCU previously.  Roberta expresses that she has goals of care conversations in the past with dire predictions but Hermilo always seems to recover and get back home.    ADVANCE CARE PLANNING:  No health care directive on file. Per  informed consent policy, next of kin should be involved if patient becomes unable.  There is a POLST form on file, this was reviewed and current.  Code status: Full Code    MEDICAL MANAGEMENT:   #High Risk for Delirium  Avoid benzodiazepines, antihistamines, anticholinergics if able.  Lights on and blinds open during the day.  Reorient frequently.  Lights & TV off during the night.  Promote normal circadian rhythm.  Limit sensory deprivation - utilize hearing aids, glasses, etc.  Frequently assess basic needs such as temperature, elimination, thirst/hunger, pain     #General Weakness/Debility/Dysphagia   Physical Therapy  Occupational therapy  Speech Language therapy  Assist with " "repositioning for comfort and to maintain skin integrity.  Currently NPO    PSYCHOSOCIAL/SPIRITUAL SUPPORT:  Family wife Roberta and son Mikal present today. There is also a daughter.    Palliative Care will continue to follow. Thank you for the consult and allowing us to aid in the care of Hermilo Velasquez.    Reviewed patient case with hospitalist Kathy Lopez PA-C, patient's RN,     Razia Osman, CNS  Securely message with [employer] (more info)  Text page via [employer] Paging/Directory      During regular M-F work hours ([phone]) -- please contact me on [employer]   In my absence, securely message our team via Voc[employer] \"[facility]\" or go to On Call tab in [employer], search for \"[facility]\"   After regular work hours and on weekends/holidays, to reach our on call physician, securely message via Voc[employer] \"[facility]\" or go to On Call tab in [employer], search for \"[facility] [place]\"     Palliative Summary/HPI     Hermilo Velasquez is a 89 year old male with a past medical history of 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      Today, the patient was seen for:  Goals of care    Palliative Care Summary:   Met with wife Roberta, son Mikal and Kathy Lopez PA-C/hospitalist.   I introduced our role as an extra layer of support and how we help patients and families dealing with serious, potentially life-limiting illnesses. I explained the composition of the palliative care team.  Palliative care helps patients and families navigate their care while focusing on the " whole person; providing emotional, social and spiritual support  Palliative care often assists with symptom management, information sharing about what to expect from the illness, available treatment options and what effect those options may have on the disease course, and provide effective communication and caring support. and Introduced the role of palliative care as an interdisciplinary team that cares for patients with serious illness to help support symptom management, communication, coping for patients and their families as well as support with medical decision making.    Prognosis, Goals, & Planning:    Functional Status just prior to this current hospitalization:  Previous hospitalizations within the past 6 months:   1/08-1/11 mechanical fall- Prior medical recommendations/plans for Hermilo to discharge to TCU but he refused to do so. He was to continue oral vancomycin until seen by [facility] for fecal transplant, a follow up C-diff PCR, and IV hydration but this did not occur.   11/25 - 12/01 recurrent C. difficile colitis, small PE  9/12 - 9/16 diarrhea and hypotension related to recurrent C. difficile colitis with sepsis  8/16 - 8/19 recurrent C. difficile infection with colitis, COPD   There has been a decline in daily function including inability to get out of chair to use bathroom, sleeping most of the day for quite some time per son.    Palliative Performance Scale (PPS): 50%  Extensive disease. Normal or reduced intake; normal LOC or confusion; little ambulation (mainly sit/lie), ADLs w/much assistance, unable to do any work.    ECOG3 (Capable of only limited self-care; needs help with ADLs; in bed/chair >50% of waking hours)  Interim history/status while hospitalized: Hermilo had a STAT head CT with no acute findings. Uncertainty as to reason for altered mental status. Pending tests include Ammonia, ABG, C-diff (as no stool yet). He is to continue oral vancomycin for C-diff until fecal transplant.  "Patient is currently NPO as he is lethargic and at high risk for aspiration.SLT to see.    Prognosis, Goals, and/or Advance Care Planning:  We discussed general treatment options (full/restorative, selective/conservatives, and comfort only/hospice). We then discussed how these specifically apply to Hermilo's medical status and how wife Roberta is unable to provide the level of care that Hermilo would need due to weakness and toileting needs.  Reviewed quality of life issues with family and their understanding of whet Hermilo would want for his ongoing medical care. If Hermilo had to live in a SNF would he want to extend his life with future hospitalizations if he could not return home again and had to live in a facility the rest of his life. Roberta states that Hermilo has been at a facility in the past and did not seem to mind. Roberta states that \"he would want to live.\" Neither Roberta or Horacio doubted that Hermilo would not be agreeable to LTC and further medical interventions.  Roberta expresses that she has had these types of conversations in the hospital and Hermilo has always seemed to recover and get back home. She wants to give Hermilo time with medical care to see how he does.  Discussed what continuing restorative/life-prolonging care entails, including continued (re)admissions to the hospital, continuing with preventative and primary care, seeing disease/organ specific specialty consultations for medical treatments in hopes to prolong life for as long as possible.    Option of comfort care/hospice was also addressed as an option. It was explained to family that comfort care is a good option when the burdens of aggressive treatments outweigh the benefits and are unwanted by patient, or not in their best interest. Comfort care focuses on optimizing quality of life and relief from distressing symptoms such as pain, shortness of breath, nausea, and anxiety and allowing a natural dying process. When comfort care is initiated, " restorative focused care and artificial means to sustain life are discontinued, including further diagnostic testing, lab draws, blood glucose testing/insulin, tube feedings, IV fluids, antibiotics, medications not for comfort, and vital signs.     Code Status was addressed today:   Yes, We discussed potential risks and rationale of attempting cardiac resuscitation, intubation, and mechanical ventilation.  We also discussed probability of survival as well as quality of life implications.  Based on this discussion, patient or surrogate response/decision: continue current Full Code Status. Wife Roberta will consider information.      Patient's decision making preferences: unable to assess        Patient has decision-making capacity today for complex decisions:Unreliable          Coping, Meaning, & Spirituality:   Mood, coping, and/or meaning in the context of serious illness were addressed today: No    Social:   Living situation:lives with significant other/spouse  Important relationships/caregivers:wife Roberta and son Mikal.   Areas of fulfillment/marlene: used to love hunting and fishing, working around the house on projects.    Medications:  I have reviewed this patient's medication profile and medications from this hospitalization. Notable medications:     Noted scheduled meds are:   DULoxetine  60 mg Oral Daily    fludrocortisone  0.1 mg Oral Daily    gabapentin  600 mg Oral At Bedtime    tamsulosin  0.4 mg Oral Daily    vancomycin  125 mg Oral 4x Daily       Noted PRN meds are:  acetaminophen **OR** acetaminophen, ipratropium - albuterol 0.5 mg/2.5 mg/3 mL, melatonin, ondansetron **OR** ondansetron    ROS:  Comprehensive ROS is unobtainable due to mental status.    PHYSICAL EXAM:  Vital Signs: Temp: 97.6  F (36.4  C) Temp src: Oral BP: 128/68 Pulse: 95   Resp: 18 SpO2: 90 % O2 Device: None (Room air) Oxygen Delivery: 1 LPM  Weight: 171 lbs 8.29 oz  Wt Readings from Last 4 Encounters:   01/16/24 77.8 kg (171 lb 8.3 oz)    01/09/24 62.1 kg (136 lb 14.5 oz)   12/07/23 63.5 kg (140 lb)   11/30/23 63.8 kg (140 lb 10.5 oz)     GENERAL:  Curled up in fetal position, fatigued, sleeping soundly, no  distress, appears frail.  HEAD: Normocephalic atraumatic  SCLERA: Anicteric  ABDOMEN:  no distention  RESPIRATORY: Breathing non labored  NEUROLOGIC: Lethargic. Confused.  PSYCH: Calm.    Data reviewed:  Lab Results   Component Value Date    WBC 9.3 01/16/2024    WBC 10.7 01/15/2024    WBC 5.3 01/10/2024    HGB 8.8 (L) 01/16/2024    HGB 11.0 (L) 01/15/2024    HGB 9.5 (L) 01/11/2024    HCT 27.7 (L) 01/16/2024    HCT 35.7 (L) 01/15/2024    HCT 28.5 (L) 01/10/2024     (L) 01/16/2024     01/15/2024     (L) 01/10/2024     01/16/2024     01/15/2024     01/10/2024    POTASSIUM 3.7 01/16/2024    POTASSIUM 4.0 01/15/2024    POTASSIUM 4.0 01/10/2024    CHLORIDE 109 (H) 01/16/2024    CHLORIDE 100 01/15/2024    CHLORIDE 107 01/10/2024    CO2 26 01/16/2024    CO2 30 (H) 01/15/2024    CO2 24 01/10/2024    BUN 21.0 01/16/2024    BUN 18.3 01/15/2024    BUN 19.4 01/10/2024    CR 1.14 01/16/2024    CR 1.31 (H) 01/15/2024    CR 1.00 01/10/2024     (H) 01/16/2024     (H) 01/15/2024    GLC 79 01/10/2024    SED 43 (H) 10/06/2022    SED 89 (H) 05/16/2019    SED 83 (H) 05/15/2019    DD 4.6 (H) 03/30/2020    NTBNPI 24,255 (H) 01/15/2024    NTBNPI 6,480 (H) 11/25/2023    NTBNPI 4,535 (H) 08/16/2023    NTBNP 1,233 (H) 08/28/2020    NTBNP 1,593 (H) 08/11/2020    NTBNP 1,363 (H) 10/17/2014    TROPI 0.513 (HH) 04/14/2021    TROPI 0.529 (HH) 04/14/2021    TROPI 0.331 (HH) 04/13/2021    AST 60 (H) 01/15/2024    AST 7 01/08/2024    AST 12 11/25/2023    ALT 15 01/15/2024    ALT <5 01/08/2024    ALT 7 11/25/2023    ALKPHOS 102 01/15/2024    ALKPHOS 90 01/08/2024    ALKPHOS 126 11/25/2023    BILITOTAL 0.9 01/15/2024    BILITOTAL 0.3 01/08/2024    BILITOTAL 0.6 11/25/2023    DAMIAN 17 11/06/2022    INR 1.53 (H) 01/08/2024    INR  1.15 09/12/2023    INR 1.19 (H) 10/09/2022      Lab Results   Component Value Date    ALBUMIN 3.6 01/15/2024    ALBUMIN 2.5 11/06/2022    ALBUMIN 2.1 04/14/2021     Results for orders placed or performed during the hospital encounter of 01/15/24   XR Chest 2 Views    Impression    IMPRESSION: Unchanged right lower lobe consolidation with bilateral interstitial opacities and small right pleural effusion. Unchanged borderline enlarged cardiomediastinal silhouette. Median sternotomy wires and aortic valve replacement.       Medical Decision Making       Please see A&P for additional details of medical decision making.  MANAGEMENT DISCUSSED with the following over the past 24 hours: TADEO Houser, patient's RN   NOTE(S)/MEDICAL RECORDS REVIEWED over the past 24 hours: Medicine, ED, [facility] PT, ,   Tests REVIEWED in the past 24 hours:  - See lab/imaging results included in the data section of the note  SUPPLEMENTAL HISTORY, in addition to the patient's history, over the past 24 hours obtained from:   - Spouse or significant other  - Son Mikal       Much or all of the text in this note was generated through the use of Dragon dictation, voice to text software. Errors in spelling or words which appear to be out of context are unintentional and may be present due to having escaped editing.

## 2024-01-16 NOTE — PLAN OF CARE
"SLP: Received order for swallow evaluation per MD. Attempted to see pt x2. Pt meeting with medical team this PM and then pt refused to participate in evaluation later. Per family, pt with poor interest in PO. Will re-schedule for tomorrow AM as appropriate.     Pt is familiar to inpatient SLP department. He was last seen in October-November 2022. VFSS completed which revealed: \"Mild oral-pharyngeal dysphagia. Recommend consideration of GI consult given emesis observed, report of emesis prior to admit, and concerns for possible aspiration of reflux/emesis. Oral-pharyngeal deficits include mild oral residue, mild decreased epiglottic closure, hyoid elevation, and base of tongue function, and tight UES. Deficits resulted in min to mild-mod pharyngeal residue and min flash to mod penetration with residual of thin liquids by cup. Mod-max cues to use a chin tuck eliminated penetration. From an SLP standpoint, recommend a continued IDDSI Diet Level 7 and thin liquids with supervision/cues to use safe swallow strategies including a chin tuck and 2 swallows per sip of thin liquids and reflux precautions. \" Pt elected to advance to easy-to-chew solids and thin liquids with known aspiration risk. At that time, pt recommended to use a chin tuck with thin liquids to reduce risk.     Prior to SLP evaluation, would defer diet management to MD. Ok to advance to easy-to-chew solids and thin liquids as tolerated with encouragement for chin tuck.       " EMERGENCY DEPARTMENT HISTORY AND PHYSICAL EXAM    5:54 AM    Date: 1/5/2022  Patient Name: Kamla Chavez    History of Presenting Illness     Chief Complaint   Patient presents with    Back Pain       History Provided By: Patient  Location/Duration/Severity/Modifying factors   Patient is a 35-year-old female presents to the ED with a chief complaint of left-sided flank pain. Patient reports the symptoms onset on and off over the last 2 weeks. Pain worsens also with back bending and twisting of the back but worsens seemingly with urination as well. She also complains of some dysuria. She tells that she was treated by her primary care doctor for presumed UTI that was not tested with medication starting with a C presumably cephalexin. She tells me she only took a few doses and then discontinued it because of some leg swelling which she attributes to the medication. Denies any incontinence of urine or any saddle anesthesia or any weakness numbness or tingling. The pain starts in the left low back and radiates into the left buttock. PCP: HOMAR Mccormick    Current Facility-Administered Medications   Medication Dose Route Frequency Provider Last Rate Last Admin    ketorolac (TORADOL) injection 60 mg  60 mg IntraMUSCular NOW Elio Yepez DO         Current Outpatient Medications   Medication Sig Dispense Refill    naproxen (NAPROSYN) 250 mg tablet Take 1 Tablet by mouth two (2) times daily as needed for Pain for up to 5 days. 10 Tablet 0    tiZANidine (ZANAFLEX) 2 mg tablet Take 1 Tablet by mouth three (3) times daily as needed for Muscle Spasm(s) for up to 5 days. 15 Tablet 0    nitrofurantoin, macrocrystal-monohydrate, (Macrobid) 100 mg capsule Take 1 Capsule by mouth two (2) times a day for 5 days. 10 Capsule 0       Past History     Past Medical History:  No past medical history on file. Past Surgical History:  No past surgical history on file.     Family History:  No family history on file.    Social History:  Social History     Tobacco Use    Smoking status: Not on file    Smokeless tobacco: Not on file   Substance Use Topics    Alcohol use: Not on file    Drug use: Not on file       Allergies: Allergies   Allergen Reactions    Lisinopril Angioedema       I reviewed and confirmed the above information with patient and updated as necessary. Review of Systems     Review of Systems   Constitutional: Negative for chills and fever. HENT: Negative for congestion, rhinorrhea, sinus pressure and sneezing. Eyes: Negative for visual disturbance. Respiratory: Negative for cough and shortness of breath. Cardiovascular: Negative for chest pain. Gastrointestinal: Negative for abdominal pain, diarrhea, nausea and vomiting. Genitourinary: Positive for difficulty urinating, dysuria and urgency. Negative for frequency and vaginal bleeding. Musculoskeletal: Positive for back pain. Negative for neck pain. Skin: Negative for rash. Neurological: Negative for syncope, numbness and headaches. Physical Exam     Visit Vitals  BP (!) 187/89   Pulse 78   Temp 97.8 °F (36.6 °C)   Resp 18   SpO2 98%       Physical Exam  Vitals and nursing note reviewed. Constitutional:       General: She is not in acute distress. Appearance: Normal appearance. She is normal weight. HENT:      Head: Normocephalic and atraumatic. Right Ear: External ear normal.      Left Ear: External ear normal.      Nose: Nose normal. No congestion or rhinorrhea. Mouth/Throat:      Mouth: Mucous membranes are moist.   Eyes:      Conjunctiva/sclera: Conjunctivae normal.   Cardiovascular:      Rate and Rhythm: Normal rate and regular rhythm. Pulses: Normal pulses. Heart sounds: Normal heart sounds. No murmur heard. Pulmonary:      Effort: Pulmonary effort is normal.      Breath sounds: Normal breath sounds. No wheezing, rhonchi or rales. Abdominal:      General: Abdomen is flat. Tenderness: There is no abdominal tenderness. There is no guarding or rebound. Musculoskeletal:         General: Tenderness (tenderness on palpation to the left gluteal region in proximity to left iliac crest) present. No swelling. Normal range of motion. Cervical back: Normal range of motion and neck supple. Right lower leg: No edema. Left lower leg: No edema. Skin:     General: Skin is warm and dry. Capillary Refill: Capillary refill takes less than 2 seconds. Findings: No rash. Neurological:      General: No focal deficit present. Mental Status: She is alert. Cranial Nerves: No cranial nerve deficit. Sensory: No sensory deficit. Motor: No weakness. Diagnostic Study Results     Labs -  Recent Results (from the past 24 hour(s))   URINALYSIS W/ RFLX MICROSCOPIC    Collection Time: 01/05/22  5:45 AM   Result Value Ref Range    Color YELLOW      Appearance CLEAR      Specific gravity 1.016 1.005 - 1.030      pH (UA) 6.5 5.0 - 8.0      Protein Negative NEG mg/dL    Glucose Negative NEG mg/dL    Ketone Negative NEG mg/dL    Bilirubin Negative NEG      Blood Negative NEG      Urobilinogen 0.2 0.2 - 1.0 EU/dL    Nitrites Negative NEG      Leukocyte Esterase SMALL (A) NEG     URINE MICROSCOPIC ONLY    Collection Time: 01/05/22  5:45 AM   Result Value Ref Range    WBC 0 to 3 0 - 5 /hpf    RBC Negative 0 - 5 /hpf    Epithelial cells 1+ 0 - 5 /lpf    Bacteria 1+ (A) NEG /hpf         Radiologic Studies -   No orders to display           Medical Decision Making   I am the first provider for this patient. I reviewed the vital signs, available nursing notes, past medical history, past surgical history, family history and social history. Vital Signs-Reviewed the patient's vital signs.     Records Reviewed: Nursing Notes and Old Medical Records (Time of Review: 5:54 AM)      Provider Notes (Medical Decision Making):   MDM  Number of Diagnoses or Management Options  Acute left-sided low back pain without sciatica  Cystitis  Diagnosis management comments: 80-year-old female presenting with dysuria as well as some left-sided low back pain. Patient has no concern for pregnancy. I suspect this is likely lumbar strain with cystitis as her urine is not suggestive of an infection that would indicate pyelonephritis and no hematuria to indicate a kidney stone. She took some antibiotics earlier in the week but did not finish the course of the could interfere with the results in her urine. We will treat her with Macrobid as she reports the cephalexin that she got was causing side effects. Will treat her likewise for lumbar muscular strain. Recommended the patient return if she is feeling worse in the meantime. Follow-up with PCP. Patient in agreement with the plan. Patient blood pressure was elevated, she reports she did not take her medicine this morning. At this time, patient is stable and appropriate for discharge home.  Patient demonstrates understanding of current diagnoses and is in agreement with the treatment plan. Kavya Fernandez are advised that while the likelihood of serious underlying condition is low at this point given the evaluation performed today, we cannot fully rule it out. Kavya Fernandez are advised to immediately return with any new symptoms or worsening of current condition. Any Incidental findings were noted on the patient's discharge paperwork as well as verbally directly to the patient, and the appropriate follow-up was given to the patient as far as instructions on testing needed as well as the timeframe.  All questions have been answered. Brenda Tellez is given educational material regarding their diagnoses, including danger symptoms and when to return to the ED. This note was dictated utilizing Dragon voice recognition software. Unfortunately this leads to occasional typographical errors. I apologize in advance if the situation occurs.  If questions occur please do not hesitate to contact me directly. Marlon Olmstead DO          ED Course: Progress Notes, Reevaluation, and Consults:       Procedures    Critical Care Time: N/a    Diagnosis     Clinical Impression:   1. Cystitis    2. Acute left-sided low back pain without sciatica        Disposition: Discharge    Follow-up Information     Follow up With Specialties Details Why Contact Info    Dori Mcdonald [facility] In 2 days  Katiuska Mcpherson [address] [facility]      THE [facility] EMERGENCY DEPT Emergency Medicine  As needed, If symptoms worsen; or [facility] [address] [address] [phone]           Patient's Medications   Start Taking    NAPROXEN (NAPROSYN) 250 MG TABLET    Take 1 Tablet by mouth two (2) times daily as needed for Pain for up to 5 days. NITROFURANTOIN, MACROCRYSTAL-MONOHYDRATE, (MACROBID) 100 MG CAPSULE    Take 1 Capsule by mouth two (2) times a day for 5 days. TIZANIDINE (ZANAFLEX) 2 MG TABLET    Take 1 Tablet by mouth three (3) times daily as needed for Muscle Spasm(s) for up to 5 days. Continue Taking    No medications on file   These Medications have changed    No medications on file   Stop Taking    No medications on file       Marlon Olmstead DO   Emergency Medicine   January 5, 2022, 5:54 AM     This note is dictated utilizing Dragon voice recognition software. Unfortunately this leads to occasional typographical errors using the voice recognition. I apologize in advance if the situation occurs. If questions occur please do not hesitate to contact me directly. Patient was seen  and treated during the context of the COVID-19 pandemic. Contemporary protocols utilized based on the best available evidence, utilizing evolving public health  guidelines and treatment protocols.     Marlon Olmstead DO

## 2024-01-16 NOTE — PHARMACY-ADMISSION MEDICATION HISTORY
Pharmacist Admission Medication History    Admission medication history is complete. The information provided in this note is only as accurate as the sources available at the time of the update.    Information Source(s): Family member, Clinic records, and [facility] via phone    Changes made to PTA medication list:  Added: None  Deleted: Anderson Capps - no fills through [facility] or [facility] and previous notes indicate pt wasn't taking due to cost  Changed: hydroxyzine    Allergies reviewed with patient and updates made in EHR: no    Medication History Completed By: Crissy Gilliam RPH 1/16/2024 9:11 AM    Prior to Admission medications    Medication Sig Last Dose Taking? Auth Provider Long Term End Date   acetaminophen (TYLENOL) 500 MG tablet Take 1,000 mg by mouth 3 times daily as needed prn Yes Reported, Patient     albuterol (PROAIR HFA/PROVENTIL HFA/VENTOLIN HFA) 108 (90 Base) MCG/ACT inhaler Inhale 2 puffs into the lungs every 6 hours as needed prn Yes Reported, Patient Yes    atorvastatin (LIPITOR) 10 MG tablet Take 1 tablet (10 mg) by mouth daily 1/15/2024 Yes Michael Donaldson MD Yes    DULoxetine (CYMBALTA) 60 MG capsule Take 60 mg by mouth daily 1/15/2024 Yes Unknown, Entered By History No    famotidine (PEPCID) 40 MG tablet Take 1 tablet (40 mg) by mouth 2 times daily 1/15/2024 Yes Aniket Bowden MD     ferrous sulfate (FE TABS) 325 (65 Fe) MG EC tablet Take 1 tablet (325 mg) by mouth 2 times daily 1/15/2024 Yes Reported, Patient     fludrocortisone (FLORINEF) 0.1 MG tablet Take 1 tablet (0.1 mg) by mouth daily 1/15/2024 Yes Karson Bishop MD Yes    gabapentin (NEURONTIN) 300 MG capsule Take 2 capsules (600 mg) by mouth At Bedtime For neuropathy pain 1/15/2024 Yes Glenny Kelly MD Yes    hydrOXYzine HCl (ATARAX) 25 MG tablet Take 2 tablets (50 mg) by mouth every 6 hours as needed  Patient taking differently: Take 25 mg by mouth every 6 hours as needed prn Yes Karson Bishop MD      tamsulosin (FLOMAX) 0.4 MG capsule Take 1 capsule (0.4 mg) by mouth daily 1/15/2024 Yes Glenny Kelly MD     tiotropium (SPIRIVA) 18 MCG inhaled capsule Inhale 18 mcg into the lungs daily 1/15/2024 Yes Unknown, Entered By History No

## 2024-01-16 NOTE — PROGRESS NOTES
Admission/Transfer from: ED  2 RN skin assessment completed. Yes  Significant findings include: Bruise to right upper arm, redness to coccyx and groin,   WOC Nurse Consult Ordered? No

## 2024-01-16 NOTE — H&P
[facility]    History and Physical  Hospitalist     Date of Admission:  1/15/2024    Assessment & Plan   Hermilo Velasquez is a 89 year old male admitted on January 15, 2024 for failure to thrive in setting of diarrhea.    H/o recurrent c diff colitis  Diarrhea  The patient started having diarrhea on January 14 and has had 2 bowel movements in the last 12 hours.  Additionally has had decreased p.o. intake to food and liquid as well as generalized weakness.  Given his history of recurrent C. Difficile I am concerned that he is experiencing another recurrence of C. difficile colitis.  Previously recommended that the patient follow up at [facility] for fecal transplantation, but he has not.  Id note from 11/28/23 had recommended a prolonged taper of vancomycin. Previously he had been recommended to continue po vancomycin indefinitely until fecal transplant.  It appears that his oral vancomycin was discontinued during his last admission in January due to lack of diarrhea.  -start po vancomycin until seen by [facility] for fecal transplant  -Follow-up C. difficile PCR  -IV hydration    Failure to thrive  The patient has previously refused to go to a facility for continued care.  His wife is having difficulty caring for him at home and would like to try to convince him again to go to a facility.  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  Continue supplemental O2    Hx of VTE  Hx of subsegmental pe on 11/25/23  Not on ac due to hx of gi bleed. Hx of IVC filter in the past, not clear if it was removed.    pAfib  Not on anticoagulation due to anemia thought to be due to a gi bleed. Hyas history of angioextasia and diverticulosis.  -resume pta meds once med rec is complete    Severe aortic stenosis s/p bioprosthetic aortic valve  Htn  HLD  Elevated BNP  Chronically elevated troponin  The patient is chest pain-free.  His EKG does not have any ST elevations or depressions. He appears dry on exam with no pitting edema and very dark urine in bedside urinal.   -Monitor on telemetry  -Follow-up repeat troponin    BPH  -Resume PTA Flomax  -Bladder protocol    History of GI bleed  Gerd  -Continue PTA meds once med rec is complete    CKD 3  Creatinine mildly elevated from previous values likely due to hypovolemia in setting of his diarrhea.  -IV hydration overnight  -follow up repeat chemistry in am    DVT ppx: scd  Expected length of stay less than 2 days  Code Status: full code    Primary Care Physician   Karson Bishop    Chief Complaint   Generalized Weakness and Diarrhea    History obtained from the patient and his wife. History was limited from the patient due to his hearing difficulties and his chronic speech difficulties.    History of Present Illness   Hermilo Velasquez is a 89 year old male with a past medical history significant for recurrent C. difficile colitis, history of adenocarcinoma of the right lung with multiple lung nodules concerning for disease recurrence presents to hospital for failure to thrive.  The patient started experiencing diarrhea starting on January 14.  He has had at least 2 bowel movements which were watery and loose.  Additionally has had fecal incontinence due to his diarrhea.  Additionally he has been having decreased oral appetite to food  and liquids.  He has also been experiencing weakness to the point where he is unable to get up and go to the bathroom.  He asked his wife for help earlier in the day but she was unable to get him up to the bathroom.  His wife is concerned that she is no longer able to take care of them and would like consideration for placement therefore brought him into the hospital.    Past Medical History    I have reviewed this patient's medical history and updated it with pertinent information if needed.   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 Vasques     Aortic stenosis     Severe AS, 9/2015 AVR - [facility] TRIFECTA Bovine bioprosthesis 25MM TF-25A    Atrial fibrillation (H)     9/2015 Paroxysmal post op Afib - discharged on Warfarin and a beta blocker    COPD (chronic obstructive pulmonary disease) (H)     Deep vein thrombosis (H)     GERD (gastroesophageal reflux disease)     Heart murmur     Monoclonal gammopathy     plasmacyte prominent causing monoclonal gammopathy    Need for SBE (subacute bacterial endocarditis) prophylaxis     Neuropathy     Other and unspecified hyperlipidemia     Other malignant lymphomas     non hodgkin's lymphoma    RBBB (right bundle branch block)     Severe sepsis with acute organ dysfunction (H) 11/16/2015    Unspecified hereditary and idiopathic peripheral neuropathy        Past Surgical History   I have reviewed this patient's surgical history and updated it with pertinent information if needed.  Past Surgical History:   Procedure Laterality Date    APPENDECTOMY      ARTHROPLASTY KNEE Right 7/25/2022    Procedure: RIGHT TOTAL KNEE ARTHROPLASTY;  Surgeon: Giuliano Deshpande MD;  Location: [facility] OR    AS TOTAL KNEE ARTHROPLASTY      BACK SURGERY      ESOPHAGOSCOPY, GASTROSCOPY, DUODENOSCOPY (EGD), COMBINED N/A 11/28/2015    Procedure: COMBINED ESOPHAGOSCOPY, GASTROSCOPY, DUODENOSCOPY (EGD);  Surgeon: Danis Castillo  MD;  Location:  GI    ESOPHAGOSCOPY, GASTROSCOPY, DUODENOSCOPY (EGD), COMBINED N/A 7/26/2018    Procedure: COMBINED ESOPHAGOSCOPY, GASTROSCOPY, DUODENOSCOPY (EGD);;  Surgeon: Toby Dong DO;  Location:  GI    ESOPHAGOSCOPY, GASTROSCOPY, DUODENOSCOPY (EGD), COMBINED N/A 8/17/2020    Procedure: ESOPHAGOGASTRODUODENOSCOPY (EGD);  Surgeon: Herrera Henry MD;  Location:  GI    ESOPHAGOSCOPY, GASTROSCOPY, DUODENOSCOPY (EGD), COMBINED N/A 10/24/2020    Procedure: ESOPHAGOGASTRODUODENOSCOPY (EGD);  Surgeon: Vick Florentino MD;  Location:  GI    ESOPHAGOSCOPY, GASTROSCOPY, DUODENOSCOPY (EGD), COMBINED N/A 4/11/2022    Procedure: ESOPHAGOGASTRODUODENOSCOPY (EGD);  Surgeon: Vick Florentino MD;  Location:  GI    ESOPHAGOSCOPY, GASTROSCOPY, DUODENOSCOPY (EGD), COMBINED N/A 8/26/2022    Procedure: ESOPHAGOGASTRODUODENOSCOPY (EGD);  Surgeon: El Mcgovern MD;  Location:  GI    HERNIA REPAIR  2006    HERNIORRHAPHY VENTRAL  4/17/2013    Procedure: HERNIORRHAPHY VENTRAL;  VENTRAL HERNIA REPAIR WITH MESH;  Surgeon: Patel Guzman MD;  Location:  OR    KNEE SURGERY      arthroscopic right knee surgery     LOBECTOMY LUNG Right 3/26/2019    POSSIBLE LOBECTOMY LUNG per Dr. [facility]ques at [facility]    REPAIR LIGAMENT ANKLE  2/23/2012    Procedure:REPAIR LIGAMENT ANKLE; LEFT TARSAL TUNNEL RELEASE OF KNOT OF ARIEL RELEASE; Surgeon:SAUL PUENTE; Location: OR    REPLACE VALVE AORTIC N/A 9/3/2015    Procedure: REPLACE VALVE AORTIC;  Surgeon: Antonino Mitchell MD;  Location:  OR    ROTATOR CUFF REPAIR RT/LT      bilateral    SPINE SURGERY      3 spine surgeries    THORACOTOMY, WEDGE RESECTION LUNG, COMBINED Right 3/26/2019    RIGHT THORACOTOMY, WEDGE RESECTION, RIGHT LOWER LOBE LUNG NODULE,;  Surgeon: Jose A Vasques MD;  Location:  OR    TONSILLECTOMY      TURP         Allergies   Allergies   Allergen Reactions    Omeprazole Itching    Pantoprazole Itching    Prevacid  "[Lansoprazole] Itching    Lasix [Furosemide] Rash    Lidocaine Blisters and Rash     Allergy to lidocaine ointment  Allergy to lidocaine ointment      Penicillin G Rash    Penicillins Rash     \"broke out from injection\" 60 yrs ago  Tolerates cephalosporins       Social History   I have reviewed this patient's social history and updated it with pertinent information if needed. Hermilo Velasquez  reports that he quit smoking about 25 years ago. His smoking use included cigarettes. He has a 110 pack-year smoking history. He has never used smokeless tobacco. He reports that he does not currently use alcohol. He reports that he does not use drugs.    Family History   I have reviewed this patient's family history and updated it with pertinent information if needed.   Family History   Problem Relation Age of Onset    C.A.D. Father     Emphysema Father     Melanoma No family hx of     Skin Cancer No family hx of          Physical Exam   Temp: 97.7  F (36.5  C) Temp src: Temporal BP: 119/78 Pulse: 96   Resp: 20 SpO2: 97 % O2 Device: Nasal cannula Oxygen Delivery: 2 LPM  Vital Signs with Ranges  Temp:  [97.7  F (36.5  C)-98  F (36.7  C)] 97.7  F (36.5  C)  Pulse:  [] 96  Resp:  [20] 20  BP: (103-126)/(48-81) 119/78  SpO2:  [90 %-97 %] 97 %  170 lbs 0 oz  Physical Exam  Vitals reviewed.   Constitutional:       Appearance: Normal appearance.      Comments: Pleasant elderly man seen resting in bed comfortably in no apparent distress. He is hard of hearing and did not have his hearing aides on. He additionally has speech difficulties with slurred and garbled speech which his wife reports is chronic.    HENT:      Mouth/Throat:      Comments: The patient refused examination of his mouth.  Eyes:      Extraocular Movements: Extraocular movements intact.      Conjunctiva/sclera: Conjunctivae normal.   Cardiovascular:      Rate and Rhythm: Normal rate. Rhythm irregular.      Heart sounds: Normal heart sounds. No murmur " heard.  Pulmonary:      Effort: Pulmonary effort is normal.      Breath sounds: Normal breath sounds. No wheezing, rhonchi or rales.   Abdominal:      General: Abdomen is flat. Bowel sounds are normal.      Palpations: Abdomen is soft.      Tenderness: There is abdominal tenderness.      Comments: Reports mild diffuse tenderness to palpation without rebound or guarding.    Genitourinary:     Comments: The patient had a sample of his urine in the room and was noted to be dark  Musculoskeletal:         General: No swelling.   Skin:     General: Skin is warm and dry.   Neurological:      General: No focal deficit present.      Mental Status: He is alert and oriented to person, place, and time. Mental status is at baseline.       Medical Decision Making       75 MINUTES SPENT BY ME on the date of service doing chart review, history, exam, documentation & further activities per the note.

## 2024-01-16 NOTE — PROGRESS NOTES
RECEIVING UNIT ED HANDOFF REVIEW    ED Nurse Handoff Report was reviewed by: Prince Samuel RN on January 15, 2024 at 11:37 PM

## 2024-01-16 NOTE — PROGRESS NOTES
"   01/16/24 1059   Appointment Info   Signing Clinician's Name / Credentials (PT) Brittany Dressler, BIANCA   Living Environment   People in Home spouse   Current Living Arrangements house   Number of Stairs, Main Entrance 2   Stair Railings, Main Entrance railings safe and in good condition   Transportation Anticipated family or friend will provide   Self-Care   Usual Activity Tolerance fair   Current Activity Tolerance poor   Equipment Currently Used at Home walker, standard;walker, rolling;cane, straight   Fall history within last six months yes   Number of times patient has fallen within last six months   (at least 2 per chart.)   Activity/Exercise/Self-Care Comment Per chart given pt with AMS.   General Information   Onset of Illness/Injury or Date of Surgery 01/15/24   Referring Physician Lobo Aguirre MD   Patient/Family Therapy Goals Statement (PT) Pt unable to state: AMS. Wife: wants [facility].   Pertinent History of Current Problem (include personal factors and/or comorbidities that impact the POC) Pt brought back to hospital after wife took him home at his request (PT rec was TCU), struggling to manage at home. FTT with recurrent C. difficile colitis,  red coccyx, adenocarcinoma of the right lung, COPD/emphysema with chronic hypoxic respiratory failure, on 2L of oxygen, h/o DVT/PE, paroxysmal atrial fibrillation, hypertension, CKD stage III, dyslipidemia, BPH, non-Hodgkin lymphoma, GERD, who presented on 1/8/2024 with episode of mechanical fall at home   Existing Precautions/Restrictions fall   Cognition   Affect/Mental Status (Cognition) confused;low arousal/lethargic   Follows Commands (Cognition) follows one-step commands;increased processing time needed;delayed response/completion;physical/tactile prompts required;repetition of directions required;verbal cues/prompting required   Cognitive Status Comments AMS, pt unable to contribute conversationally beyond \"yes\" and \"okay\" with PT.   Pain " Assessment   Patient Currently in Pain Yes, see Vital Sign flowsheet  (back pain - activity to tolerance)   Posture    Posture Comments Impaired stability, controlled mobility.   Range of Motion (ROM)   ROM Comment WFL   Strength (Manual Muscle Testing)   Strength Comments Unable to power to sit/stand on his own, poor activity tolerance, 2 LPM NC at baseline and in room.   Bed Mobility   Comment, (Bed Mobility) Pt unable to figure out task to sit up, too weak to power, AMS impairing ability to figure it out. MaxA - see rx.   Transfers   Comment, (Transfers) Pt unable to power to stand despite elevated EOB, UE use, weak & unsteady. ModA - see rx.   Gait/Stairs (Locomotion)   Distance in Feet (Gait) See rx.   Balance   Balance Comments Karma unsupported sitting balance posterior leaning.   Coordination   Coordination Comments WFL   Muscle Tone   Muscle Tone Comments WFL   Clinical Impression   Criteria for Skilled Therapeutic Intervention Yes, treatment indicated   PT Diagnosis (PT) Impaired mobility   Influenced by the following impairments Impaired strength, balance, activity tolerance.   Functional limitations due to impairments Impaired independent mobility & living   Clinical Presentation (PT Evaluation Complexity) evolving   Clinical Decision Making (Complexity) low complexity   Planned Therapy Interventions (PT) balance training;bed mobility training;gait training;home exercise program;neuromuscular re-education;patient/family education;postural re-education;stair training;strengthening;transfer training;progressive activity/exercise;home program guidelines;risk factor education   Risk & Benefits of therapy have been explained evaluation/treatment results reviewed;care plan/treatment goals reviewed;risks/benefits reviewed;current/potential barriers reviewed;participants voiced agreement with care plan;participants included;patient   PT Total Evaluation Time   PT Usman, Low Complexity Minutes (31626) 5   Physical  Therapy Goals   PT Frequency 5x/week   PT Predicted Duration/Target Date for Goal Attainment 01/26/24   PT: Bed Mobility Supervision/stand-by assist;Supine to/from sit;Rolling   PT: Transfers Supervision/stand-by assist;Sit to/from stand;Bed to/from chair;Assistive device   PT: Gait Supervision/stand-by assist;Rolling walker;150 feet   PT: Stairs Supervision/stand-by assist;Rail on both sides;2 stairs   Interventions   Interventions Quick Adds Self-Care/Home Mgmt;Neuromuscular Re-ed   Therapeutic Activity   Therapeutic Activities: dynamic activities to improve functional performance Minutes (43132) 10   Symptoms Noted During/After Treatment Fatigue   Treatment Detail/Skilled Intervention Pt needing increased stim to wake up, dozes off easily, MaxA supine-sit for initaition, motor planning, power and balance; EOB sit balance per below. Sit-stand ModA from elevated bed with UE assist, PT powering and stabilizing, pivot and amb to transport cart pt below with PT helping with weight-shifting, balance and ensuring legs not buckling. Karma stand-sit, CGA sit balance with cues for retro scoot, MAxAx2 sit-supine pivot, CNA handoff.   Self-Care/Home Management   Treatment Detail/Skilled Intervention PT called wife/caregiver and edu on pt is unsafe to return home, rec TCU then re-assess long-term support needs, or LTC with home PT. Wife wanting [facility].   Gait Training   Distance in Feet 2' bed to transport cart Karma PT in front with pt holding on IV pole and transport cart - slow, shuffling, shaky, unsteady, rapid fatigue.   Neuromuscular Re-education   Treatment Detail/Skilled Intervention Karma sitting posterior LOB, once brought to midline pt able to hold with close SBA.   PT Discharge Planning   PT Plan PT: functional strength, balance, activity tolerance progressions.   PT Discharge Recommendation (DC Rec) Transitional Care Facility;Long term care facility   PT Rationale for DC Rec Pt brought back to hospital after  declining TCU rec, struggling to manage, sedentary at home, now weaker yet. Unsafe to return home. Rec either TCU then re-assess long-term living support needs, or LTC with home PT. Wife asking for [facility].   PT Brief overview of current status Mod tx PT in front - weak & unsteady, 2L per baseline.   PT Equipment Needed at Discharge   (Evi Glover vs RW.)   Total Session Time   Timed Code Treatment Minutes 10   Total Session Time (sum of timed and untimed services) 15

## 2024-01-16 NOTE — CONSULTS
[facility]  WO Nurse Inpatient Assessment     Consulted for: Coccyx/ Buttock    Summary: Patient has a mix of moisture and pressure injury on buttock/coccyx/sacrum present on admission.     Patient History (according to provider note(s):      89 year-old male with past medical history significant for recurrent C difficile colitis, history of adenocarcinoma of right lung with multiple lung nodules concerning for disease recurrence, severe aortic stenosis s/p bioprosthetic aorrtic vavle, BPH, CKDIII, history of GI bleed and COPD with chronic hypoxic respiratory failure on 2L oxygen baseline who [facility] for failure to thrive in setting of diarrhea. Palliative care consulted     Assessment:      Areas visualized during today's visit: Sacrum/coccyx    Pressure Injury Location: Sacrum/ Bilateral Buttock    Last photo: 1/16/24    Wound type: Mixed Etiology: Pressure and Moisture     Pressure Injury Stage: Deep Tissue Pressure Injury (DTPI), present on admission     Wound history/plan of care:   Patient with reduced mobility and cdiff colitis. Frequent bowel incontinence plays a contributing factor into wound. But pressure is also a component.     Wound base: 20 %  thin yellow fibrinous tissue mixed with pink moist tissue , 80 % non-blanchable, purple, and epidermis     Palpation of the wound bed: normal      Drainage: scant     Description of drainage: serous     Measurements (length x width x depth, in cm) 7cm  x 6cm  x  0.1 cm      Tunneling N/A     Undermining N/A  Periwound skin: Intact and Dry/scaly      Color: dusky      Temperature: normal   Odor: none  Pain: mild, tender  Pain intervention prior to dressing change: patient tolerated well and slow and gentle cares   Treatment goal: Heal  and Protection  STATUS: initial assessment  Supplies ordered: supplies stored on unit and discussed with RN    My PI Risk Assessment     Sensory  "Perception: 2 - Very Limited     Moisture: 3 - Occasionally moist      Activity: 2 - Chairfast     Mobility: 3 - Slightly limited      Nutrition: 2 - Probably inadequate      Friction/Shear: 1 - Problem     TOTAL: 13         Treatment Plan:     Sacrum/Bilateral buttock wound(s): Daily   1. Clean wound with saline or MicroKlenz Spray, pat dry  2. Wipe / \"clean\" the surrounding periwound tissue with skin prep (Cavilon No Sting Skin Prep #694739) and allow to dry. This will help protect periwound and help dressing adherence  3. Press a Mepilex Sacral to the area, making sure to conform nicely to skin curvatures.   4. Time and date dressing change  5. Follow PIP orders  IF unable to maintain dressing due to frequent stooling, OK to to switch to twice daily cleanses of [place] perineal cleanser and tamar cloths and apply Triad paste nickel thick over open wounds and a thin layer over red/purple tissue.    Pressure Injury Prevention (PIP) Plan:  If patient is declining pressure injury prevention interventions: Explore reason why and address patient's concerns, Educate on pressure injury risk and prevention intervention(s), If patient is still declining, document \"informed refusal\" , and Ensure Care team is aware ( provider, charge nurse, etc)  Mattress: Follow bed algorithm, reassess daily and order specialty mattress, if indicated.  HOB: Maintain at or below 30 degrees, unless contraindicated  Repositioning in bed: Every 1-2 hours , Left/right positioning; avoid supine, and Raise foot of bed prior to raising head of bed, to reduce patient sliding down (shear)  Heels: Keep elevated off mattress and Pillows under calves  Protective Dressing: Sacral Mepilex for prevention (#894834),  especially for the agitated patient   Positioning Equipment: None  Chair positioning: Chair cushion (#084997)  and Assist patient to reposition hourly   If patient has a buttock pressure injury, or high risk for PI use chair cushion or " SPS.  Moisture Management: Perineal cleansing /protection: Follow Incontinence Protocol, Avoid brief in bed, Clean and dry skin folds with bathing , Consider InterDry (#763523) between folds, and Moisturize dry skin  Under Devices: Inspect skin under all medical devices during skin inspection , Ensure tubes are stabilized without tension, and Ensure patient is not lying on medical devices or equipment when repositioned  Ask provider to discontinue device when no longer needed.           Orders: Written    RECOMMEND PRIMARY TEAM ORDER: None, at this time  Education provided: importance of repositioning, plan of care, Moisture management, Hygiene, and Off-loading pressure  Discussed plan of care with: Patient, Nurse, and Nurse Practitioner  WOC nurse follow-up plan: weekly  Notify WOC if wound(s) deteriorate.  Nursing to notify the Provider(s) and re-consult the WOC Nurse if new skin concern.    DATA:     Current support surface: Standard  Standard gel/foam mattress (IsoFlex, Atmos air, etc)  Containment of urine/stool: Incontinent pad in bed  BMI: Body mass index is 24.61 kg/m .   Active diet order: Orders Placed This Encounter      NPO for Medical/Clinical Reasons Except for: No Exceptions     Output: No intake/output data recorded.     Labs:   Recent Labs   Lab 01/16/24  0658 01/15/24  1855   ALBUMIN  --  3.6   HGB 8.8* 11.0*   WBC 9.3 10.7     Pressure injury risk assessment:   Sensory Perception: 3-->slightly limited  Moisture: 3-->occasionally moist  Activity: 2-->chairfast  Mobility: 3-->slightly limited  Nutrition: 2-->probably inadequate  Friction and Shear: 2-->potential problem  Jamin Score: 15    Sophia Browne CWOCN   Dept. Vocera- Contact [facility] Nurse (Padmini) via  Vocera   Dept. Office Number: [phone]

## 2024-01-16 NOTE — PROGRESS NOTES
[facility]    Medicine Progress Note - Hospitalist Service    Date of Admission:  1/15/2024    Assessment & Plan   Hermilo Velasquez is a 89 year-old male with past medical history significant for recurrent C difficile colitis, history of adenocarcinoma of right lung with multiple lung nodules concerning for disease recurrence, severe aortic stenosis s/p bioprosthetic aorrtic vavle, BPH, CKDIII, history of GI bleed and COPD with chronic hypoxic respiratory failure on 2L oxygen baseline who [facility] for failure to thrive in setting of diarrhea. Palliative care consulted, plan for goals of care discussion with patient and wife at 1400.    Altered mental status  * Unclear etiology. Difficult to arouse. No sedating medications given.  - STAT head CT with no acute findings.  - Lytes wnl.  - UA negative, follow urine culture.  - ABG pending though sating 100 on 3 LPM.  - Ammonia level pending.    H/o recurrent c diff colitis  Diarrhea  * The patient started having diarrhea on January 14 and has had 2 bowel movements in the last 12 hours.  Additionally has had decreased p.o. intake to food and liquid as well as generalized weakness.  Given his history of recurrent C. Difficile I am concerned that he is experiencing another recurrence of C. difficile colitis.  * Previously recommended that the patient follow up at [facility] for fecal transplantation, but he has not.  * ID note from 11/28/23 had recommended a prolonged taper of vancomycin. Previously he had been recommended to continue po vancomycin indefinitely until fecal transplant.  It appears that his oral vancomycin was discontinued during his last admission in January due to lack of diarrhea.  - Continue PO vancomycin until seen by [facility] for fecal transplant - unable to give as NPO for dysphagia.  - Follow-up C. difficile PCR.  - Continue IV hydration.    Failure to thrive  * The patient  has previously refused to go to a facility for continued care. His wife is having difficulty caring for him at home and would like to try to convince him again to go to a facility. Patient will need TCU vs long-term care at discharge.  - PT, social work consulted.  - Palliative care consulted this morning.  - Goals of care discussion this pm.    Dysphagia  - NPO.  - SLP consulted.    Adenocarcinoma of the right lung s/p wedge resection 2019  * Follows [place] [facility]  * Planned for pet scan and biopsy of nodule on 1/9 was cancelled by the patient  - Oncology has previously noted that the patient is not a chemo candidate.  - Outpatient follow-up with oncology.    FDG avid soft tissue nodule in the right tongue base  * Concern for squamous cell carcinoma.   - Appointment with ENT on 1/11 was cancelled to work up the lung first.  - Outpatient follow-up with ENT.    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    COPD   Chronic hypoxic respiratory failure on 2L O2  - Lungs are clear on auscultation.  - Continue supplemental O2.  - Continue PTA Florinef - holding for dysphagia.  - Encourage deep breaths, activity as tolerated and incentive spirometry.  - Monitor on continuous oximetry.  - PT/OT.    Hx of VTE  * History of subsegmental PE on 11/25/23  - Not on ac due to hx of gi bleed. Hx of IVC filter in the past, not clear if it was removed.  - Worrisome to be full code and not anticoagulated given recent subsegmental PE in 11/23 - goals of care discussion this pm.    pAfib  * Not on anticoagulation due to anemia thought to be due to a GI bleed. Has history of angioextasia and diverticulosis.  - Continue PTA meds once med rec is complete    Severe aortic stenosis s/p bioprosthetic aortic valve  HTN  HLD  Elevated BNP  Chronically elevated troponin  * The patient is chest pain-free.  His EKG does not have any ST elevations or depressions. He appears  dry on exam with no pitting edema and very dark urine in bedside urinal.   - Monitor on telemetry.  - Follow-up repeat troponin flat. No further rechecks.    BPH  - Continue PTA Flomax.  - Bladder protocol.    History of GI bleed  GERD  - Continue PTA meds    CKD 3  * Creatinine mildly elevated from previous values likely due to hypovolemia in setting of his diarrhea.  - Received IV hydration overnight.  - Repeat chemistry wnl. Scr now 1.14.    Anemia  - 11.0 on admit.  - 8.8 this morning, possibly dilutional component.  - Low concerns for ongoing, acute bleeding.  - Monitor.    Depression/Anxiety/Pain  - Continue PTA Duloxetine - holding as NPO for dysphagia.  - Continue PTA Gabapentin 600 mg at bedtime - holding as NPO for dysphagia.        Diet: NPO for Medical/Clinical Reasons Except for: No Exceptions    DVT Prophylaxis: Pneumatic Compression Devices and Ambulate every shift  Suazo Catheter: Not present  Lines: None     Cardiac Monitoring: ACTIVE order. Indication: AMI (NSTEMI/ STEMI) (48 hours)  Code Status: Full Code      Clinically Significant Risk Factors Present on Admission                  # Hypertension: Noted on problem list  # Chronic heart failure with preserved ejection fraction: heart failure noted on problem list and last echo with EF >50%         # Financial/Environmental Concerns:    # COPD: noted on problem list        Disposition Plan      Expected Discharge Date: 01/18/2024        Discharge Comments: PT/OT  CM/SW  Palliative care consult.          The patient's care was discussed with the Attending Physician, Dr. Ferguson .    Kathy Lopez PA-C  Hospitalist Service  [facility]  Securely message with [employer] (more info)  Text page via [employer] Paging/Directory   ______________________________________________________________________    Interval History   Patient seen and examined. Lethargic this morning, arouses then easily drifts back to sleep. STAT head CT with chronic  changes, no acute findings. POC discussed with wife via phone call at 1100 - goals of care discussion at 1330. Patient changed to inpatient status after review.    Physical Exam   Vital Signs: Temp: 97.4  F (36.3  C) Temp src: Oral BP: 139/89 Pulse: 92   Resp: 18 SpO2: 100 % O2 Device: Nasal cannula Oxygen Delivery: 3 LPM  Weight: 171 lbs 8.29 oz   General: Awakes to sternal rub, alert, does not answer questions. Follows simple commands. Chronically ill elderly male in no acute distress.  HEENT: Normocephalic, atraumatic  Respiratory: mild bilateral wheeze present  Cardiovascular: Regular rate and rhythm, no peripheral edema.   Gastrointestinal: Soft, non-tender, non-distended. Bowel sounds present.  Skin: Warm, dry. Dorsalis pedis pulses palpable bilaterally.  Musculoskeletal: Moves all extremities equally.  Neurologic:  Awakes to sternal rub, alert, does not answer questions. Follows simple commands. Chronically ill elderly male in no acute distress. Moves all extremities  Psychiatric: lethargic     Medical Decision Making   45 MINUTES SPENT BY ME on the date of service doing chart review, history, exam, documentation & further activities per the note.      Data   ------------------------- PAST 24 HR DATA REVIEWED -----------------------------------------------    I have personally reviewed the following data over the past 24 hrs:    9.3  \   8.8 (L)   / 144 (L)     144 109 (H) 21.0 /  107 (H)   3.7 26 1.14 \     ALT: 15 AST: 60 (H) AP: 102 TBILI: 0.9   ALB: 3.6 TOT PROTEIN: 7.4 LIPASE: 13     Trop: 59 (H) BNP: 24,255 (H)     TSH: 3.89 T4: N/A A1C: N/A     Procal: N/A CRP: N/A Lactic Acid: 1.5         Imaging results reviewed over the past 24 hrs:   Recent Results (from the past 24 hour(s))   XR Chest 2 Views    Narrative    EXAM: XR CHEST 2 VIEWS  LOCATION: [facility]  DATE: 1/15/2024    INDICATION: Generalized weakness  COMPARISON: 1/08/2024      Impression    IMPRESSION: Unchanged  right lower lobe consolidation with bilateral interstitial opacities and small right pleural effusion. Unchanged borderline enlarged cardiomediastinal silhouette. Median sternotomy wires and aortic valve replacement.   CT Head w/o Contrast    Narrative    CT SCAN OF THE HEAD WITHOUT CONTRAST   1/16/2024 11:40 AM     HISTORY: altered mental status    TECHNIQUE:  Axial images of the head and coronal reformations without  IV contrast material. Radiation dose for this scan was reduced using  automated exposure control, adjustment of the mA and/or kV according  to patient size, or iterative reconstruction technique.    COMPARISON: 1/8/2024    FINDINGS: Bifrontal subdural hygromas are stable. Mild chronic small  vessel ischemic changes. Mild global cortical volume loss with ex  vacuo dilatation of the ventricular system. Intracranial atheromatous  disease.    Osseous calvarium is intact. Scattered mucosal sinus disease in the  left maxillary sinus and scattered ethmoid air cells. Mastoid air  cells are clear.      Impression    IMPRESSION:   No acute findings. Chronic changes as above.      BRIDGETT SPEARS MD         SYSTEM ID:  [id number]

## 2024-01-16 NOTE — PROGRESS NOTES
op portion must ALWAYS be completed  PRIMARY Concern: Diarrhea and weakness.  SAFETY RISK Concerns (fall risk, behaviors, etc.): Fall risk      Tests/Procedures for NEXT shift: Need stool sample  Consults? (Pending/following, signed-off?) None  Where is patient from? (Home, TCU, etc.): Home.   Other Important info for NEXT shift: need stool sample.   Anticipated DC date & active delays: TBD  _____________________________________________________________________________  SUMMARY NOTE:     Up to unit overnight, was very impulsive and combative, refused medications. Was also unable to answer questions, admission not done this shift.   Orientation/Cognitive: Oriented to self only.   Observation Goals (Met/ Not Met): Not met.   Mobility Level/Assist Equipment: Not up yet.   Antibiotics & Plan (IV/po, length of tx left): None.   Pain Management: denied pain.   Tele/VS/O2: On Tele.  ABNL Lab/BG: Joseoni is 59, see results.   Diet: Regular.   Bowel/Bladder: Incontinent of stool once.   Skin Concerns: Redness to sacrum/coccyx and perineum.   Drains/Devices: PIV infusing.   Patient Stated Goal for Today: Get labs done.

## 2024-01-17 NOTE — PLAN OF CARE
PRIMARY Concern: Diarrhea and generalized weakness/failure to thrive  SAFETY RISK Concerns (fall risk, behaviors, etc.): Fall risk /fall band/bed alarm on  Tests/Procedures for NEXT shift: still need sample and MRI   Consults: WOC, PT, and SW following, Palliative signed off,  speech to see tomorrow.  Where is patient from? (Home, TCU, etc.): Lives at home with wife  Other Important info for NEXT shift: bladder scan Q6 , Bladder scan at 2200  for 158  Anticipated DC date & active delays: To be determined  _____________________________________________________________________________  SUMMARY NOTE:  Orientation/Cognitive: only alert to self  Observation Goals (Met/ Not Met):Inpatient   Mobility Level/Assist Equipment: Assist of 2 pivot Gait belt/Walker  Antibiotics & Plan (IV/po, length of tx left): Flagyl and vancomycin po  Pain Management: denies pain  Tele/VS/O2: VSS on 2L NC; On Tele: Afib CVR  ABNL Lab/BG:   Diet: Easy to chew diet  Bowel/Bladder: Incontinent of B/B. No stool.  Stool sample pending  Skin Concerns: Redness blanchable to sacrum/coccyx, mepilex on   Drains/Devices: PIV infusing w/ LR 75 cc/hr

## 2024-01-17 NOTE — PROGRESS NOTES
[facility]    Medicine Progress Note - Hospitalist Service    Date of Admission:  1/15/2024    Assessment & Plan   Hermilo Velasquez is a 89 year-old male with past medical history significant for recurrent C difficile colitis, history of adenocarcinoma of right lung with multiple lung nodules concerning for disease recurrence, severe aortic stenosis s/p bioprosthetic aorrtic vavle, BPH, CKDIII, history of GI bleed and COPD with chronic hypoxic respiratory failure on 2L oxygen baseline who was admitted to [facility] on 1/15/2024 for failure to thrive in setting of diarrhea.      Altered mental status  * Unclear etiology. Difficult to arouse. No sedating medications given.  - STAT head CT with no acute findings.  MRI brain with no acute finding.  - Lytes wnl.  Ammonia normal, VBG with normal pH, pCO2 only minimally elevated.  - UA negative, urine culture with mixed urogenital gloria  - ABG pending though sating 100 on 3 LPM.  -Neurology recommendation pending     H/o recurrent c diff colitis  Diarrhea  * The patient started having diarrhea on January 14 and has had 2 bowel movements in the last 12 hours.  Additionally has had decreased p.o. intake to food and liquid as well as generalized weakness.  Given his history of recurrent C. Difficile I am concerned that he is experiencing another recurrence of C. difficile colitis.  * Previously recommended that the patient follow up at [facility] for fecal transplantation, but he has not.  * ID note from 11/28/23 had recommended a prolonged taper of vancomycin. Previously he had been recommended to continue po vancomycin indefinitely until fecal transplant.  It appears that his oral vancomycin was discontinued during his last admission in January due to lack of diarrhea.  - Continue PO vancomycin until seen by [facility] for fecal transplant   - Follow-up C. difficile PCR.  - Continue IV hydration.     Failure to thrive  Goals of  care  * The patient has previously refused to go to a facility for continued care. His wife is having difficulty caring for him at home and would like to try to convince him again to go to a facility. Patient will need TCU vs long-term care at discharge.  - PT, social work consulted.  - Palliative care evaluated the patient and wife wants restorative care including full code     Dysphagia  -Speech following, currently on dysphagia diet     Adenocarcinoma of the right lung s/p wedge resection 2019  * [place]s [place] [facility]  * Planned for pet scan and biopsy of nodule on 1/9 was cancelled by the patient  - Oncology has previously noted that the patient is not a chemo candidate.  - Outpatient follow-up with oncology.     FDG avid soft tissue nodule in the right tongue base  * Concern for squamous cell carcinoma.   - Appointment with ENT on 1/11 was cancelled to work up the lung first.  - Outpatient follow-up with ENT.     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     COPD   Chronic hypoxic respiratory failure on 2L O2  - Lungs are clear on auscultation.  - Continue supplemental O2.  - Continue PTA Florinef - holding for dysphagia.  - Encourage deep breaths, activity as tolerated and incentive spirometry.  - Monitor on continuous oximetry.  - PT/OT.     Hx of VTE  * History of subsegmental PE on 11/25/23  - Not on ac due to hx of gi bleed. Hx of IVC filter in the past, not clear if it was removed.  - Worrisome to be full code and not anticoagulated given recent subsegmental PE in 11/23 - goals of care discussion this pm.     pAfib  * Not on anticoagulation due to anemia thought to be due to a GI bleed. Has history of angioextasia and diverticulosis.  - Continue PTA meds once med rec is complete     Severe aortic stenosis s/p bioprosthetic aortic valve  HTN  HLD  Elevated BNP  Chronically elevated troponin  * The patient is chest pain-free.  His  EKG does not have any ST elevations or depressions. He appears dry on exam with no pitting edema and very dark urine in bedside urinal.   - Monitor on telemetry.  - Follow-up repeat troponin flat. No further rechecks.     BPH  - Continue PTA Flomax.  - Bladder protocol.     History of GI bleed  GERD  - Continue PTA meds     CKD 3  * Creatinine mildly elevated from previous values likely due to hypovolemia in setting of his diarrhea.  - Received IV hydration overnight.  - Repeat chemistry wnl. Scr now 1.14.     Anemia  - 11.0 on admit.  - 8.8 this morning, possibly dilutional component.  - Low concerns for ongoing, acute bleeding.  - Monitor.     Depression/Anxiety/Pain  - Continue PTA Duloxetine - holding as NPO for dysphagia.  - Continue PTA Gabapentin 600 mg at bedtime - holding as NPO for dysphagia.      Diet:  Dysphagia diet as advanced by   DVT Prophylaxis: Pneumatic Compression Devices  Suazo Catheter: Not present  Lines: None     Cardiac Monitoring: ACTIVE order. Indication: ams  Code Status: Full Code      Clinically Significant Risk Factors Present on Admission                  # Hypertension: Noted on problem list  # Chronic heart failure with preserved ejection fraction: heart failure noted on problem list and last echo with EF >50%         # Financial/Environmental Concerns:    # COPD: noted on problem list        Disposition Plan     Expected Discharge Date: 01/18/2024        Discharge Comments: PT/OT  CM/SW  Palliative care consult.            Christa Ferguson MD  Hospitalist Service  [facility]  Securely message with [employer] (more info)  Text page via [employer] Paging/Directory   ______________________________________________________________________    Interval History   Patient hard of hearing, somnolent, awakes on calling his name but goes back to sleep again.  Tried calling the wife to update, unable to reach, left voice message    Physical Exam   Vital Signs: Temp: 98.3  F  (36.8  C) Temp src: Oral BP: 131/64 Pulse: 87   Resp: 18 SpO2: 94 % O2 Device: None (Room air) Oxygen Delivery: 2 LPM  Weight: 137 lbs 12.6 oz    Exam:  Constitutional: Sleepy but arousable. Appears comfortable  Head: Normocephalic. No masses, lesions, tenderness or abnormalities  ENMT: ENT exam normal, no neck nodes or sinus tenderness  Cardiovascular: RRR.  No murmurs, no rubs or JVD  Respiratory: Normal WOB,b/l equal air entry, no wheezes or crackles   Gastrointestinal: Abdomen soft, non-tender. BS normal. No masses, organomegaly  : Deferred  Extremities : No edema , no clubbing or cyanosis      Medical Decision Making       40 MINUTES SPENT BY ME on the date of service doing chart review, history, exam, documentation & further activities per the note.      Data         Imaging results reviewed over the past 24 hrs:   Recent Results (from the past 24 hour(s))   MR Brain w/o & w Contrast    Narrative    EXAM: MR BRAIN WITHOUT AND WITH CONTRAST  LOCATION: [facility]  DATE: 01/17/2024    INDICATION: Altered mental status.  COMPARISON: Head CT 01/16/2024. Brain MRI 06/28/2023.  CONTRAST: 7 mL Gadavist.  TECHNIQUE: Routine multiplanar multisequence head MRI without and with intravenous contrast.    FINDINGS:  INTRACRANIAL CONTENTS: No acute or subacute infarct. No mass, acute hemorrhage, or extra-axial fluid collections. Scattered nonspecific T2/FLAIR hyperintensities within the cerebral white matter most consistent with mild chronic microvascular ischemic   change. Moderate generalized cerebral atrophy. No hydrocephalus. Normal position of the cerebellar tonsils. Mild dural thickening and enhancement along the left lateral frontal lobe, likely unchanged compared to 06/28/2023.    SELLA: No abnormality accounting for technique.    OSSEOUS STRUCTURES/SOFT TISSUES: Normal marrow signal. The major intracranial vascular flow-voids are maintained.     ORBITS: No abnormality accounting for  technique.     SINUSES/MASTOIDS: No paranasal sinus mucosal disease. No middle ear or mastoid effusion.       Impression    IMPRESSION:  1.  No acute intracranial process.  2.  Mild dural thickening and enhancement along the left lateral frontal lobe, nonspecific, but likely unchanged compared to prior brain MRI 06/28/2023.  3.  Generalized brain atrophy and presumed microvascular ischemic changes as detailed above.       Recent Labs   Lab 01/16/24  0658 01/15/24  1855 01/11/24  0745   WBC 9.3 10.7  --    HGB 8.8* 11.0* 9.5*   MCV 82 83  --    * 164  --     140  --    POTASSIUM 3.7 4.0  --    CHLORIDE 109* 100  --    CO2 26 30*  --    BUN 21.0 18.3  --    CR 1.14 1.31*  --    ANIONGAP 9 10  --    AMRITA 8.9 9.6  --    * 155*  --    ALBUMIN  --  3.6  --    PROTTOTAL  --  7.4  --    BILITOTAL  --  0.9  --    ALKPHOS  --  102  --    ALT  --  15  --    AST  --  60*  --    LIPASE  --  13  --

## 2024-01-17 NOTE — PLAN OF CARE
PRIMARY Concern: Diarrhea and weakness/failure to thrive  SAFETY RISK Concerns (fall risk, behaviors, etc.): Fall risk      Tests/Procedures for NEXT shift: Need stool sample; Head CT (complete); MRI   Consults: WOC following, PT/SW following, Palliative signed off, and refused speech consult (will try again tomorrow)  Where is patient from? (Home, TCU, etc.): Home  Other Important info for NEXT shift: bladder scan Q6 (180ml on my shift), has dentures and hearing aids  Anticipated DC date & active delays: TBD  _____________________________________________________________________________  SUMMARY NOTE:  Orientation/Cognitive: Oriented to self only  Observation Goals (Met/ Not Met): Not met  Mobility Level/Assist Equipment: Assist 2 pivot GB/W  Antibiotics & Plan (IV/po, length of tx left): None  Pain Management: denies pain, PAINAD: 0  Tele/VS/O2: VSS on 2L NC; On Tele: A-fib w/ occasional PVCs  ABNL Lab/BG: Troponi is 67, 59; HG 8.8  Diet: Easy to chew   Bowel/Bladder: Incontinent of B/B, BM x2 during shift    Skin Concerns: Redness to sacrum/coccyx (mepi), buttocks, and perineum  Drains/Devices: PIV infusing w/ LR 75 ml/hr

## 2024-01-17 NOTE — PLAN OF CARE
PRIMARY Concern: Diarrhea and weakness/failure to thrive  SAFETY RISK Concerns (fall risk, behaviors, etc.): Fall risk      Tests/Procedures for NEXT shift: Need stool sample; Head CT (complete); MRI (complete)  Consults: WOC following, PT/SW following, Palliative signed off, and refused speech consult.  Where is patient from? (Home, TCU, etc.): Home  Other Important info for NEXT shift: bladder scan Q6, has dentures and hearing aids. Patient still needs stool sample.   Anticipated DC date & active delays: TBD  _____________________________________________________________________________  SUMMARY NOTE:  Orientation/Cognitive: Oriented to self only  Observation Goals (Met/ Not Met): Not met  Mobility Level/Assist Equipment: A2 pivot GB/W. T&Rq2h.   Antibiotics & Plan (IV/po, length of tx left): Flagyl q8h.  Pain Management: PAINAD: 0  Tele/VS/O2: VSS on 2L NC; On Tele: A-fib CVR.  ABNL Lab/BG: Troponin: 67, 59; Hgb: 8.8  Diet: Easy to chew   Bowel/Bladder: Incontinent of B&B, Q6h bladder scan and straight mikhail as needed (see order) Straight cathes self at home.   Skin Concerns: Redness to sacrum/coccyx (mepi), buttocks, and perineum.  Drains/Devices: PIV infusing LR @ 75mL/hr.

## 2024-01-17 NOTE — PROGRESS NOTES
"Clinical Swallow Evaluation (CSE):     01/17/24 1101   Appointment Info   Signing Clinician's Name / Credentials (SLP) Aurora Bobo MS CCC-SLP   General Information   Onset of Illness/Injury or Date of Surgery 01/15/24   Referring Physician Kathy Lopez PA-C   Patient/Family Therapy Goal Statement (SLP) Wants water   Pertinent History of Current Problem   Per provider \"Hermilo Velasquez is a 89 year-old male with past medical history significant for recurrent C difficile colitis, history of adenocarcinoma of right lung with multiple lung nodules concerning for disease recurrence, severe aortic stenosis s/p bioprosthetic aorrtic vavle, BPH, CKDIII, history of GI bleed and COPD with chronic hypoxic respiratory failure on 2L oxygen baseline who was admitted to [facility] on 1/15/2024 for failure to thrive in setting of diarrhea.\" SLP for swallow eval, chronic dysphagia. Palliative 1/16 meeting: \"Restorative without limits\"     General Observations Pt initially asleep, easily awakens and agreeable to short evaluation before fatigue/requesting to rest.   Pain Assessment   Patient Currently in Pain No   Type of Evaluation   Type of Evaluation Swallow Evaluation   Oral Motor   Oral Musculature generally intact   Structural Abnormalities none present   Mucosal Quality good   Dentition (Oral Motor)   Dentition (Oral Motor) dental appliance/dentures   Dental Appliance/Denture (Oral Motor)   (upper full, partial lower)   Facial Symmetry (Oral Motor)   Facial Symmetry (Oral Motor) WNL   Lip Function (Oral Motor)   Lip Range of Motion (Oral Motor) WNL   Tongue Function (Oral Motor)   Tongue ROM (Oral Motor) WNL   Cough/Swallow/Gag Reflex (Oral Motor)   Volitional Throat Clear/Cough (Oral Motor) WNL   Volitional Swallow (Oral Motor) WNL   Vocal Quality/Secretion Management (Oral Motor)   Vocal Quality (Oral Motor) hoarse;hypophonic   General Swallowing Observations   Past History of Dysphagia   Pt is " "familiar to inpatient SLP department. Prior VFSS's 8/31/22 and 10/9/22. Per most recent VFSS: \"Mild oral-pharyngeal dysphagia. Recommend consideration of GI consult given emesis observed, report of emesis prior to admit, and concerns for possible aspiration of reflux/emesis. Oral-pharyngeal deficits include mild oral residue, mild decreased epiglottic closure, hyoid elevation, and base of tongue function, and tight UES. Deficits resulted in min to mild-mod pharyngeal residue and min flash to mod penetration with residual of thin liquids by cup. Mod-max cues to use a chin tuck eliminated penetration.\" Last recs for Rec: Easy to chew solids and thin liquids with a chin tuck and 2 swallows, GERD precautions; prior pt was on soft/bite sized and mildly thick liquids -- extreme dislike of modifications, wanted upgrades with potential aspiration risk.     Respiratory Support nasal cannula  (2L)   Current Diet/Method of Nutritional Intake (General Swallowing Observations, NIS) easy to chew (level 7);thin liquids (level 0)   Swallowing Evaluation Clinical swallow evaluation   Clinical Swallow Evaluation   Feeding Assistance frequent cues/help required   Clinical Swallow Evaluation Textures Trialed thin liquids   Clinical Swallow Eval: Thin Liquid Texture Trial   Mode of Presentation, Thin Liquids cup;straw   Volume of Liquid or Food Presented 4 oz, hand over hand assist   Oral Phase of Swallow premature pharyngeal entry   Pharyngeal Phase of Swallow impaired;reduction in laryngeal movement;coughing/choking   Diagnostic Statement cough x2 with straw drinking, no overt clinical signs/sx with cup drinking with slight chin tuck/ head down   Esophageal Phase of Swallow   Patient reports or presents with symptoms of esophageal dysphagia Yes   Esophageal comments frequent belching with limited amounts of water   Swallowing Recommendations   Diet Consistency Recommendations soft & bite-sized (level 6);thin liquids (level 0) "   Supervision Level for Intake 1:1 supervision needed   Mode of Delivery Recommendations bolus size, small;no straws;food moistened;slow rate of intake   Postural Recommendations chin tuck  (with liquids)   Swallowing Maneuver Recommendations alternate food and liquid intake;double dry swallow   Monitoring/Assistance Required (Eating/Swallowing) check mouth frequently for oral residue/pocketing;cue for finger/lingual sweep if oral pocketing present;stop eating activities when fatigue is present;monitor for cough or change in vocal quality with intake   Recommended Feeding/Eating Techniques (Swallow Eval) maintain upright sitting position for eating;maintain upright posture during/after eating for 30 minutes;minimize distractions during oral intake;moisten oral mucosa prior to intake;provide assist with feeding;provide oral hygiene prior to intake;provide 6 smaller meals throughout day   Medication Administration Recommendations, Swallowing (SLP) as tolerated   Instrumental Assessment Recommendations instrumental evaluation not recommended at this time  (potential VFSS pending clinical status, pt wishes)   General Therapy Interventions   Planned Therapy Interventions Dysphagia Treatment   Clinical Impression   Criteria for Skilled Therapeutic Interventions Met (SLP Eval) Yes, treatment indicated   SLP Diagnosis acute on chronic mild-mod oropharyngeal dysphagia with esophageal component   Risks & Benefits of therapy have been explained evaluation/treatment results reviewed;care plan/treatment goals reviewed;risks/benefits reviewed;current/potential barriers reviewed;participants voiced agreement with care plan;participants included;patient   Clinical Impression Comments   Clinical swallow evaluation completed with thin liquids only as pt declined further trials despite encouragement, notable fatigue. Pt currently presents with acute on chronic mild-mod oropharyngeal dysphagia with esophageal component. Oromotor exam  "genearlly WFL with upper dentures/lower partials in place. Notable residue of scrambled eggs in mouth from breakfast which had to be manuallay removed with toothette. Hand over hand assist for drinking required. WFL labial seal and oral containemnt. Suspected premature spillage with improved control via cup vs straw. Notable laryngeal elevation. Cough x2 with straw drinking, no overt clinical signs/sx with cup drinking with slight chin tuck/ head down. DId not assess solids, though rec solid downgrades given oral residue from breakfast, cont thin liquids with precuations.     SLP Total Evaluation Time   Eval: oral/pharyngeal swallow function, clinical swallow Minutes ([id number]) 13   SLP Goals   Therapy Frequency (SLP Eval) 5 times/week   SLP Predicted Duration/Target Date for Goal Attainment 01/24/24   SLP Goals Swallow   SLP: Safely tolerate diet without signs/symptoms of aspiration Easy to chew diet;Thin liquids;With use of swallow precautions;With use of compensatory swallow strategies;With assistance/supervision   SLP Discharge Planning   SLP Plan PO tolerance, strategies, education, ADAT   SLP Discharge Recommendation home with home health;Transitional Care Facility   SLP Rationale for DC Rec Pt with chronic dysphagia, below baseline   SLP Brief overview of current status  Soft and Bite Sized Diet (level 6); Thin Liquids (level 0) with 1:1 assist/supervision, swallow 2x every bite, alternate between foods/liquids, single sips and encourage \"chin tuck,\" no straws, fully upright for all PO - 30-60 minutes post; HOLD if any overt difficulty or too lethargic, decline in respiratory function.   Total Session Time   Total Session Time (sum of timed and untimed services) 13       "

## 2024-01-17 NOTE — PLAN OF CARE
Goal Outcome Evaluation:  PRIMARY Concern: Diarrhea and weakness/failure to thrive  SAFETY RISK Concerns (fall risk, behaviors, etc.): Fall risk      Tests/Procedures for NEXT shift: Stool sample pending; Gen Neuro consult pending  Consults: WOC following, PT/SW following, Palliative consulted, SLP following; Gen Neuro pending  Where is patient from? (Home, TCU, etc.): Home  Other Important info for NEXT shift: Wound care complete: sacrum/coccyx: mepelex in place: CDI; check and change; T/R; pt extremely lethargic during shift: MD aware; 1:1 with all meals, upright. BL hearing aids; lower denture in place. Bladder scan q 6  Anticipated DC date & active delays: TBD  _____________________________________________________________________________  SUMMARY NOTE:  Orientation/Cognitive: Ox4; lethargic/drowsy  Mobility Level/Assist Equipment: T/R q2; per PT note, sera steady for transfers  Antibiotics & Plan (IV/po, length of tx left): Flagyl q8h; oral vanco  Pain Management: denies pain  Tele/VS/O2: VSS on 2L NC; Tele: A-fib CVR, BBB, frequent PACs, PVCs  ABNL Lab/BG: see chart  Diet: Easy to chew level 6; 1:1 supervision for mealtimes  Bowel/Bladder: Incontinent of B&B.   Skin Concerns: Redness to sacrum/coccyx: WOC following: Daily wound cares: see note  Drains/Devices: PIV infusing LR @ 75mL/hr; tele  Patient transferred to 8th floor. Gave report to receiving RN. All belongings packed and sent with transport assistant and wife.

## 2024-01-18 NOTE — PROGRESS NOTES
Pt is drowsy and confused in the morning, more awake and oriented x3 in the afternoon. VSS, on O2 2L. Up x2 with walker and GB, up in chair. No SOB or pain noted. Tolerated soft bite size diet well, x1 assist/supervision with meals. No straw. Adequate I&O. Incontinent. Sacrum wound care done, mepilex in place. BM x1, sampled was sent. On Enteric precaution to rule out C.diff.

## 2024-01-18 NOTE — PROGRESS NOTES
[facility]    Medicine Progress Note - Hospitalist Service    Date of Admission:  1/15/2024    Assessment & Plan   Hermilo Velasquez is a 89 year-old male with past medical history significant for recurrent C difficile colitis, history of adenocarcinoma of right lung with multiple lung nodules concerning for disease recurrence, severe aortic stenosis s/p bioprosthetic aorrtic vavle, BPH, CKDIII, history of GI bleed and COPD with chronic hypoxic respiratory failure on 2L oxygen baseline who was admitted to [facility] on 1/15/2024 for failure to thrive in setting of diarrhea.      Altered mental status-resolved  * Unclear etiology. Difficult to arouse. No sedating medications given.  - STAT head CT with no acute findings.  MRI brain with no acute finding.  - Lytes wnl.  Ammonia normal, VBG with normal pH, pCO2 only minimally elevated.  - UA negative, urine culture with mixed urogenital gloria  - awake and able to answer simple questions however not oriented.      H/o recurrent c diff colitis  Diarrhea  * The patient started having diarrhea on January 14 and has had 2 bowel movements in the last 12 hours.  Additionally has had decreased p.o. intake to food and liquid as well as generalized weakness.  Given his history of recurrent C. Difficile I am concerned that he is experiencing another recurrence of C. difficile colitis.  * Previously recommended that the patient follow up at [facility] for fecal transplantation, but he has not.  * ID note from 11/28/23 had recommended a prolonged taper of vancomycin. Previously he had been recommended to continue po vancomycin indefinitely until fecal transplant.  It appears that his oral vancomycin was discontinued during his last admission in January due to lack of diarrhea.    - Continue PO vancomycin until seen [facility]   - no diarrhea in over 24 hours     Failure to thrive  Goals of care  Just discharged  01/11 home after refusing TCU. Admitted there for anemia on AC for acute PE.   * The patient has previously refused to go to a facility for continued care. His wife is having difficulty caring for him at home and would like to try to convince him again to go to a facility. Patient will need TCU vs long-term care at discharge.  - PT, social work consulted.  - Palliative care evaluated the patient and wife wants restorative care including full code     Dysphagia  -Speech following, currently on dysphagia diet     Adenocarcinoma of the right lung s/p wedge resection 2019  * Follows with [facility]  * Planned for pet scan and biopsy of nodule on 1/9 was cancelled by the patient  - Oncology has previously noted that the patient is not a chemo candidate.  - Outpatient follow-up with oncology.     FDG avid soft tissue nodule in the right tongue base  * Concern for squamous cell carcinoma.   - Appointment with ENT on 1/11 was cancelled to work up the lung first.  - Outpatient follow-up with ENT.     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     COPD   Chronic hypoxic respiratory failure on 2L O2  - continue PTA inhalers     Hx of VTE  * History of subsegmental PE on 11/25/23  - Not on ac due to hx of gi bleed. Hx of IVC filter in the past, not clear if it was removed.  - Worrisome to be full code and not anticoagulated given recent subsegmental PE in 11/23 - goals of care discussion this pm.     pAfib  * Not on anticoagulation due to anemia thought to be due to a GI bleed. Has history of angioextasia and diverticulosis.  - Continue PTA meds once med rec is complete     Severe aortic stenosis s/p bioprosthetic aortic valve  HTN  HLD  Elevated BNP  Chronically elevated troponin  * The patient is chest pain-free.  His EKG does not have any ST elevations or depressions. He appears dry on exam with no pitting edema and very dark urine in bedside urinal.  "  - Monitor on telemetry.  - Follow-up repeat troponin flat. No further rechecks.  - remains on florinef for hypotension     BPH  - Continue PTA Flomax.  - Bladder protocol.     History of GI bleed  GERD  Anemia  - Continue PTA famotidine, allergic to PPI     CKD 3  * Creatinine mildly elevated from previous values likely due to hypovolemia in setting of his diarrhea.  - Received IV hydration overnight.  - Repeat chemistry wnl. Scr now 1.14.     Depression/Anxiety/Pain  - Continue PTA Duloxetine - holding as NPO for dysphagia.  - Continue PTA Gabapentin 600 mg at bedtime - holding as NPO for dysphagia.      Diet: Combination Diet Soft and Bite Sized Diet (level 6); Thin Liquids (level 0) (1:1 assist/supervision, swallow 2x every bite, alternate between foods/liquids, single sips and encourage \"chin tuck,\" fully upright for all PO - 30-60 minutes post; HOLD...Dysphagia diet as advanced by   DVT Prophylaxis: Pneumatic Compression Devices  Suazo Catheter: Not present  Lines: None     Cardiac Monitoring: None  Code Status: Full Code      Clinically Significant Risk Factors                  # Hypertension: Noted on problem list  # Chronic heart failure with preserved ejection fraction: heart failure noted on problem list and last echo with EF >50%           # Financial/Environmental Concerns:    # COPD: noted on problem list        Disposition Plan      Expected Discharge Date: 01/20/2024        Discharge Comments: PT/OT--recommending TCU  CM/SW  Palliative care following.   Neurology consult.   Speech            Kimmie Cole DO  Hospitalist Service  [facility]  Securely message with [employer] (more info)  Text page via [employer] Paging/Directory   ______________________________________________________________________    Interval History   Patient awake and answering questions but not oriented. Denies recent diarrhea. Unable to answer questions accurately per nursing this appears to be his recent " baseline here.     Physical Exam   Vital Signs: Temp: 97.6  F (36.4  C) Temp src: Oral BP: (!) 145/80 Pulse: 98   Resp: 16 SpO2: 90 % O2 Device: Nasal cannula Oxygen Delivery: 2 LPM  Weight: 141 lbs 8.57 oz    Constitutional: Awake, alert, cooperative, no apparent distress  HEENT: poor dentition  Respiratory: Clear to auscultation bilaterally, no crackles or wheezing  Cardiovascular:  no murmur noted  GI: Normal bowel sounds, soft, non-distended, non-tender  Skin/Integumen: No rashes  Other:      Medical Decision Making       40 MINUTES SPENT BY ME on the date of service doing chart review, history, exam, documentation & further activities per the note.      Data         Imaging results reviewed over the past 24 hrs:   No results found for this or any previous visit (from the past 24 hour(s)).    Recent Labs   Lab 01/16/24  0658 01/15/24  1855   WBC 9.3 10.7   HGB 8.8* 11.0*   MCV 82 83   * 164    140   POTASSIUM 3.7 4.0   CHLORIDE 109* 100   CO2 26 30*   BUN 21.0 18.3   CR 1.14 1.31*   ANIONGAP 9 10   AMRITA 8.9 9.6   * 155*   ALBUMIN  --  3.6   PROTTOTAL  --  7.4   BILITOTAL  --  0.9   ALKPHOS  --  102   ALT  --  15   AST  --  60*   LIPASE  --  13

## 2024-01-18 NOTE — PLAN OF CARE
[id number]  Orientation: Alert to Self, Lethargic/drowsy   Activity: T/Rq2h, Not OOB,   Diet/BS Checks: Easy to Chew level 6, 1:1 Supervision for mealtimes, Aspiration Precautions  Tele:  NA  IV Access/Drains: PIV Infusing LR @ 75ml/hr  Pain Management: Denies   Abnormal VS/Results: VSS on 2L NC   Bowel/Bladder: Incontinent of B/B, External Cath, No BM this shift,   Skin/Wounds: Redness to sacrum/coccyx,   Consults: WOC, Gen Neuro, PT/SW, Pallitive, SLP,   D/C Disposition: Pending,   Other Info: No BM this Shift, Stool Sample still need to be collected

## 2024-01-18 NOTE — PLAN OF CARE
Goal Outcome Evaluation:  PRIMARY Concern: Diarrhea and weakness/failure to thrive  SAFETY RISK Concerns (fall risk, behaviors, etc.): Fall risk      Tests/Procedures for NEXT shift: Stool sample needed; Gen Neuro consult pending  Consults: WOC following, PT/SW following, Palliative consulted, SLP following; Gen Neuro pending  Where is patient from? (Home, TCU, etc.): Home (TCU was recommended at recent discharge).   Other Important info for NEXT shift: Wound care done on days: sacrum/coccyx: mepilex in place. BL hearing aids; lower denture in place. Bladder scan q 6  Anticipated DC date & active delays: Goals of care, LTC vs TCU?  _____________________________________________________________________________  SUMMARY NOTE:  Orientation/Cognitive: Waxes and wanes; lethargic/drowsy and not answering any questions but then woke up as evening went on and was A&Ox2-3- D/O to place and intermittently situation.   Mobility Level/Assist Equipment: T/R q2; per PT note, david riddle for transfers  Antibiotics & Plan (IV/po, length of tx left): Flagyl q8h; oral vanco  Pain Management: denies pain  Tele/VS/O2: VSS on 2L NC; Tele: d/c  ABNL Lab/BG: see chart  Diet: Easy to chew level 6; 1:1 supervision for mealtimes, aspiration precautions  Bowel/Bladder: Incontinent of B&B. External cath. No BM  Skin Concerns: Redness to sacrum/coccyx: WOC following: Daily wound cares: see note  Drains/Devices: PIV infusing LR @ 75mL/hr;

## 2024-01-18 NOTE — PROGRESS NOTES
SW left a message with patient's wife Roberta to complete consult. Will await a call back.     Carol Edward LGSW

## 2024-01-18 NOTE — PROGRESS NOTES
"PALLIATIVE CARE PROGRESS NOTE  [facility]     Patient Name: Hermilo Velasquez  Date of Admission: 1/15/2024   Today the patient was seen for: goals of care, sx assessment     Recommendations & Counseling       GOALS OF CARE:   Restorative without limits   Wife Roberta and son Mikal want restorative focus with hope that Hermilo's medical status and weakness will improve.   Roberta is unable to provide the level of care that Hermilo would need due to weakness and toileting needs.  On Tuesday, reviewed quality of life issues with Roberta/son Mikal and their understanding of what Hermilo would want for his ongoing medical care if he had to live in a SNF and could not improve medically? Would he want to extend his life with future hospitalizations and medical treatments in this situation? Roberta states that Hermilo had been at SNF in the past and did not seem to mind. Roberta states that \"he would want to live.\" Neither Roberta or Horacio doubted that Hermilo would not be agreeable to LTC and further medical interventions, although notes indicate her refused TCU previously.  Roberta expresses that she has goals of care conversations in the past with dire predictions but Hermilo always seems to recover and get back home.   Roberta does not have any further questions or concerns at this time. Continues to want current cares and full code status.    ADVANCE CARE PLANNING:  No health care directive on file. Per  informed consent policy, next of kin should be involved if patient becomes unable.  There is a POLST form on file, this was reviewed and current.  Code status: Full Code    DECISION MAKING:  Patient's decision making preferences: shared with support from loved ones  Patient has decision-making capacity today for complex decisions:Questionable is not able to recall current medical history- poor insight.    MEDICAL MANAGEMENT:   #Delirium- multiple comorbidity, possible cancer recurrence, general frailty.   MRI no acute " "changes identified.  Avoid benzodiazepines, antihistamines, anticholinergics if able.  Lights on and blinds open during the day.  Reorient frequently.  Lights & TV off during the night.  Promote normal circadian rhythm.  Limit sensory deprivation - utilize hearing aids, glasses, etc.  Frequently assess basic needs such as temperature, elimination, thirst/hunger, pain     #General Weakness/Debility   Physical Therapy  Occupational therapy  Would likely need LTC vs TCU (if he participates in therapies.)    #Dysphagia- worsens during periods of  AMS. Per SLT notes: He has been followed by SLT for dysphagia in the past and had declined diet recommendations despite aspiration risk.  SLT consulted and followed since 1/16. Appreciate recommendations and expertise.  Current assessment: acute on chronic mild-mod oropharyngeal dysphagia with esophageal component   Current diet recommendations: Soft and Bite Sized Diet (level 6); Thin Liquids (level 0) with 1:1 assist/supervision, swallow 2x every bite, alternate between foods/liquids, single sips and encourage \"chin tuck,\" no straws, fully upright for all PO - 30-60 minutes post; HOLD if any overt difficulty or too lethargic, decline in respiratory function.     PSYCHOSOCIAL/SPIRITUAL SUPPORT:  Family Wife and two children    Palliative Care will discussed patient case with Dr Cole and we will NOT continue to follow. Thank you for the consult and allowing us to aid in the care of Hermilo Velasquez.    Razia Osman, CNS  Securely message with [employer] (more info)  Text page via [employer] Paging/Directory       Chart documentation was completed, in part, with [employer] voice-recognition software. Even though reviewed, some grammatical, spelling, and word errors may remain.         Interval History:   MRI on 1/17 with no acute findings. Ammonia level normal,  C-diff (no stool yet to test).Spoke with  Hermilo's wife Roberta on the phone. She stated that Hermilo becomes more confused " whenever he is in the hospital but then improves. She spoke with Hermilo yesterday and feels he is mentally as he normally would at home. Reviewed our [facility] discussion on Tuesday with Roberta, her son Mikla and JESÚS. She was going to give more thought to CPR at that time. I reviewed of risks and benefits of CPR and concerns of poor outcomes for Hermilo. Roberta said she still wishes to continue full code status and restorative focus. I asked if she had any questions or concerns regarding our conversation on Tuesday. She denies any questions.     I spoke with Dr Cole regarding patient case and our Tuesday [facility] meeting with Roberta and her son as well as my conversation with Roberta today. Dr Cole said that we can sign off at this time and she will page us if we are needed in the days ahead. Pending tests include.   Assessment          Hermilo Velasquez is a 89 year old male with a past medical history of stage IV lymphoplasmacytic lymphoma/Waldenstrom's macroglobulinemia, adenocarcinoma of right lung s/p wedge resection 2019, soft tissue nodule in the right tongue base concerning for squamous cell carcinoma (was to have biopsy on 1/09 but cancelled by pt, ONC said likely not a candidate for chemo but possibly radiation), DVT/PE, COPD on 2 L at baseline, A-fib, severe aortic stenosis, HTN, CKD 3, BPH, GI bleed, anemia, GERD, chronic dysphagia,dyslipidemia, and recurrent C-diff  (plan was to continue oral vancomycin until fecal transplant at [facility]).     He presented to ED on 1/15/24 with worsening weakness, diarrhea, incontinence- wife was no longer able to meet his care needs at home. Hermilo had a STAT head CT with no acute findings.             Review of Systems:     Besides above, ROS was reviewed and is unremarkable.            Physical Exam:   Temp:  [97.6  F (36.4  C)-98.3  F (36.8  C)] 97.6  F (36.4  C)  Pulse:  [85-98] 98  Resp:  [16-18] 16  BP: (135-145)/(80-86) 145/80  SpO2:  [84 %-94 %] 90 %  141 lbs 8.57  "oz    GENERAL:  Alert, gives eye contact,fatigued, no distress, sitting up in bed    HEAD: Normocephalic atraumatic  SCLERA: Anicteric  EXTREMITIES: Warm; no edema  RESPIRATORY: Breathing non labored  ABDOMEN: Soft, non-tender.   NEUROLOGIC: Alert, fluctuating mental status, poor memory.   PSYCH: Calm, cooperative, vague historian.           Current Problem List:   Principal Problem:    Diarrhea, unspecified type  Active Problems:    Weakness      Allergies   Allergen Reactions    Omeprazole Itching    Pantoprazole Itching    Prevacid [Lansoprazole] Itching    Lasix [Furosemide] Rash    Lidocaine Blisters and Rash     Allergy to lidocaine ointment  Allergy to lidocaine ointment      Penicillin G Rash    Penicillins Rash     \"broke out from injection\" 60 yrs ago  Tolerates cephalosporins            Data Reviewed:     Lab Results   Component Value Date    WBC 9.3 01/16/2024    WBC 10.7 01/15/2024    WBC 5.3 01/10/2024    HGB 8.8 (L) 01/16/2024    HGB 11.0 (L) 01/15/2024    HGB 9.5 (L) 01/11/2024    HCT 27.7 (L) 01/16/2024    HCT 35.7 (L) 01/15/2024    HCT 28.5 (L) 01/10/2024     (L) 01/16/2024     01/15/2024     (L) 01/10/2024     01/16/2024     01/15/2024     01/10/2024    POTASSIUM 3.7 01/16/2024    POTASSIUM 4.0 01/15/2024    POTASSIUM 4.0 01/10/2024    CHLORIDE 109 (H) 01/16/2024    CHLORIDE 100 01/15/2024    CHLORIDE 107 01/10/2024    CO2 26 01/16/2024    CO2 30 (H) 01/15/2024    CO2 24 01/10/2024    BUN 21.0 01/16/2024    BUN 18.3 01/15/2024    BUN 19.4 01/10/2024    CR 1.14 01/16/2024    CR 1.31 (H) 01/15/2024    CR 1.00 01/10/2024     (H) 01/16/2024     (H) 01/15/2024    GLC 79 01/10/2024    SED 43 (H) 10/06/2022    SED 89 (H) 05/16/2019    SED 83 (H) 05/15/2019    DD 4.6 (H) 03/30/2020    NTBNPI 24,255 (H) 01/15/2024    NTBNPI 6,480 (H) 11/25/2023    NTBNPI 4,535 (H) 08/16/2023    NTBNP 1,233 (H) 08/28/2020    NTBNP 1,593 (H) 08/11/2020    NTBNP 1,363 (H) " 10/17/2014    TROPI 0.513 (HH) 04/14/2021    TROPI 0.529 (HH) 04/14/2021    TROPI 0.331 (HH) 04/13/2021    AST 60 (H) 01/15/2024    AST 7 01/08/2024    AST 12 11/25/2023    ALT 15 01/15/2024    ALT <5 01/08/2024    ALT 7 11/25/2023    ALKPHOS 102 01/15/2024    ALKPHOS 90 01/08/2024    ALKPHOS 126 11/25/2023    BILITOTAL 0.9 01/15/2024    BILITOTAL 0.3 01/08/2024    BILITOTAL 0.6 11/25/2023    DAMIAN 16 01/16/2024    DAMIAN 17 11/06/2022    INR 1.53 (H) 01/08/2024    INR 1.15 09/12/2023    INR 1.19 (H) 10/09/2022     Lab Results   Component Value Date    ALBUMIN 3.6 01/15/2024    ALBUMIN 2.5 11/06/2022    ALBUMIN 2.1 04/14/2021     Narrative & Impression   EXAM: MR BRAIN WITHOUT AND WITH CONTRAST  LOCATION: [facility]  DATE: 01/17/2024     INDICATION: Altered mental status.  COMPARISON: Head CT 01/16/2024. Brain MRI 06/28/2023.  Moderate generalized cerebral atrophy. No hydrocephalus. Normal position of the cerebellar tonsils. Mild dural thickening and enhancement along the left lateral frontal lobe, likely unchanged compared to 06/28/2023.                                                                 IMPRESSION:  1.  No acute intracranial process.  2.  Mild dural thickening and enhancement along the left lateral frontal lobe, nonspecific, but likely unchanged compared to prior brain MRI 06/28/2023.  3.  Generalized brain atrophy and presumed microvascular ischemic changes as detailed above.       Medical Decision Making     Please see A&P for additional details of medical decision making.  MANAGEMENT DISCUSSED with the following over the past 24 hours: Dr Cole   NOTE(S)/MEDICAL RECORDS REVIEWED over the past 24 hours: Medicine, SLT, Nursing  Tests REVIEWED in the past 24 hours:  - See lab/imaging results included in the data section of the note  SUPPLEMENTAL HISTORY, in addition to the patient's history, over the past 24 hours obtained from:   - Spouse or significant other and   Douglas

## 2024-01-19 NOTE — PLAN OF CARE
[id number]    Orientation: A&Ox3, disoriented to situation    Activity: A2 w/GB+W, T/R q 2 hrs    Diet/BS Checks: Soft, bite-size with thin liquids, 1:1 supervision with eating. Takes meds crushed in applesauce    Tele: N/A    IV Access/Drains: R PIV infusing LR @ 75 mL/hr    Pain Management: Denies pain    Abnormal VS/Results: VSS on 2L NC    Bowel/Bladder: Incontinent, ext cath in place, no BM this shift    Skin/Wounds: Wound to coccyx - mepilex in place    Consults: SW, Speech, WOC, Palliative    D/C Disposition: Pending improvement    Other Info: C. Diff negative, enteric precautions removed.

## 2024-01-19 NOTE — PLAN OF CARE
[id number]  Orientation: Aox3 Disoriented to situation   Activity:A2 GB/W T/Q 2 hr   Diet/BS Checks: Soft, bite-size with thin liquids, 1:1 supervision with eating. Takes meds crushed in applesauce  Tele:  N/A  IV Access/Drains: R PIV infusing LR @ 75ml/hr   Pain Management: Denies  Abnormal VS/Results: VSS on 2L NC  Bowel/Bladder: Incontinent, ext cath in place   Skin/Wounds: Wound to coccyx - mepilex in place  Consults: SW, Speech, WOC, Palliative   D/C Disposition: Pending improvement

## 2024-01-19 NOTE — PLAN OF CARE
Shift Note [id number]    Orientation: Aox1 disoriented to time, situation, place.  Activity: A1-2 Gb/W  Diet/BS Checks: Soft mechanical bite size, 1:1 supervision with eating. Meds crushed with applesauce.   Tele:  n/a  IV Access/Drains: RPIV SL  Pain Management: Denies  Abnormal VS/Results: AVSS on 2L o2  Bowel/Bladder: incontinent, external cath in place.   Skin/Wounds: Wound to coccyx, mepi in place.   Consults: LUIS  D/C Disposition: Pending   Other Info:

## 2024-01-19 NOTE — CONSULTS
Care Management Initial Consult    General Information  Assessment completed with: Spouse or significant otherRoberta  Type of CM/SW Visit: Initial Assessment    Primary Care Provider verified and updated as needed: Yes   Readmission within the last 30 days: lack of support      Reason for Consult: discharge planning  Advance Care Planning:            Communication Assessment  Patient's communication style: spoken language (English or Bilingual)    Hearing Difficulty or Deaf: yes        Cognitive  Cognitive/Neuro/Behavioral: .WDL except  Level of Consciousness: alert  Arousal Level: opens eyes spontaneously  Orientation: disoriented x 4  Mood/Behavior: calm  Best Language: 0 - No aphasia  Speech: logical, clear    Living Environment:   People in home: spouse  Roberta  Current living Arrangements: house      Able to return to prior arrangements: yes       Family/Social Support:  Care provided by: spouse/significant other  Provides care for: no one, unable/limited ability to care for self  Marital Status:   Wife, Children  Roberta       Description of Support System: Supportive, Involved    Support Assessment: Adequate family and caregiver support, Adequate social supports, Caregiver difficulty providing physical care/safety, Caregiver difficulty providing emotional support    Current Resources:   Patient receiving home care services:       Community Resources:    Equipment currently used at home: walker, standard, walker, rolling, cane, straight  Supplies currently used at home:      Employment/Financial:  Employment Status: , previous service        Financial Concerns: none   Referral to Financial Worker: No       Does the patient's insurance plan have a 3 day qualifying hospital stay waiver?  No    Lifestyle & Psychosocial Needs:  Social Determinants of Health     Food Insecurity: No Food Insecurity (5/12/2020)    Hunger Vital Sign     Worried About Running Out of Food in the Last Year: Never true     Ran  Out of Food in the Last Year: Never true   Depression: Not at risk (10/26/2023)    PHQ-2     PHQ-2 Score: 0   Housing Stability: Not on file   Tobacco Use: Medium Risk (12/7/2023)    Patient History     Smoking Tobacco Use: Former     Smokeless Tobacco Use: Never     Passive Exposure: Not on file   Financial Resource Strain: Low Risk  (5/12/2020)    Overall Financial Resource Strain (CARDIA)     Difficulty of Paying Living Expenses: Not hard at all   Alcohol Use: Not on file   Transportation Needs: No Transportation Needs (5/12/2020)    PRAPARE - Transportation     Lack of Transportation (Medical): No     Lack of Transportation (Non-Medical): No   Physical Activity: Inactive (5/12/2020)    Exercise Vital Sign     Days of Exercise per Week: 0 days     Minutes of Exercise per Session: 0 min   Interpersonal Safety: Not At Risk (5/12/2020)    Humiliation, Afraid, Rape, and Kick questionnaire     Fear of Current or Ex-Partner: No     Emotionally Abused: No     Physically Abused: No     Sexually Abused: No   Stress: No Stress Concern Present (5/12/2020)    [employer] - Occupational Stress Questionnaire     Feeling of Stress : Not at all   Social Connections: Moderately Isolated (5/12/2020)    Social Connection and Isolation Panel [NHANES]     Frequency of Communication with Friends and Family: Twice a week     Frequency of Social Gatherings with Friends and Family: Twice a week     Attends Synagogue Services: Never     Active Member of Clubs or Organizations: No     Attends Club or Organization Meetings: Never     Marital Status:        Functional Status:  Prior to admission patient needed assistance:              Mental Health Status:          Chemical Dependency Status:                Values/Beliefs:  Spiritual, Cultural Beliefs, Synagogue Practices, Values that affect care:                 Additional Information:  Writer received consult for discharge planning. Per H&P, patient is a  "89 year old male admitted on January 15, 2024 for failure to thrive in setting of diarrhea. Patient just decently discharged from the hospital on 1/11/24. Per CM Consult that was completed pt is independent with his ADLs, is able to set up his own medications and ambulates with cane. Roberta shared that she does the cooking, cleaning, laundry and driving. Patient discharged home with [facility] RN/PT/OT and follow ups scheduled as well as home oxygen.     Writer called patients spouse Roberta and introduced self and role. Roberta states that she is hoping patient can come home. Writer explained at this time, they are recommending TCU. Roberta asked if writer has spoke with patient about this as he makes his own decisions. Writer explained that at this time, patient is confused and we would defer to her to make those decisions. Roberta states that she needs to talk with patient as he was not confused when they talked over the phone this morning. Roberta states that patient has been to [facility] in the past and would like to go back there if patient is agreeable but she will talk with him first. Roberta brought up that patient has [facility] LTC Benefits. Writer asked Roberta if she was looking for a long term place for patient or just short term. Roberta states \"what are you telling me he is not good enough to be home?\" Writer explained we just ask to make sure all of the goals for patients discharge are clear at this time.  Roberta would like SW to follow up with her this weekend as she has an appointment today and cannot answer her phone as much.     Writer sent a referral to [facility] as well as [facility] due to patients cognition at the moment. MD updated     Addendum 1407: Writer received a call from Selina at [facility]. Patient has been accepted into a private room on Sunday after 11am. They are waiving the private room fee for patient. Weekend SW to update     SY Hoang      "

## 2024-01-19 NOTE — PROGRESS NOTES
[facility]    Medicine Progress Note - Hospitalist Service    Date of Admission:  1/15/2024    Assessment & Plan   Hermilo Velasquez is a 89 year-old male with past medical history significant for recurrent C difficile colitis, history of adenocarcinoma of right lung with multiple lung nodules concerning for disease recurrence, severe aortic stenosis s/p bioprosthetic aorrtic vavle, BPH, CKDIII, history of GI bleed and COPD with chronic hypoxic respiratory failure on 2L oxygen baseline who was admitted to [facility] on 1/15/2024 for failure to thrive in setting of diarrhea.     H/o recurrent c diff colitis  Diarrhea  * The patient started having diarrhea on January 14 prior to admission   * Previously recommended that the patient follow up at [facility] for fecal transplantation, but he has not  * ID note from 11/28/23 had recommended a prolonged taper of vancomycin. Previously he had been recommended to continue po vancomycin indefinitely until fecal transplant. *oral vancomycin was discontinued during his last admission in January due to lack of diarrhea.    - Given recurrent diarrhea on admission he was started back on PO vancomycin and should continue until seen by [facility] for fecal transplant     Failure to thrive  Goals of care  Just discharged 01/11 home after refusing TCU. Admitted that time for anemia on AC for acute PE.   *Overall this is patient's 5th admission since 08/2023 with continued ongoing decline at home. TCU recommended on every discharge but patient has refused most times    - admitted this time as patient wife reported inability to care for him at home. Patient will need TCU vs long-term care at discharge.  - Palliative care evaluated the patient and wife wants restorative care including full code     Altered mental status-resolved  Occasionally difficult to arouse. Also noted to have significant cognitive deficits and memory issues  - head CT  with no acute findings.  MRI brain with no acute finding.  - Lytes wnl.  Ammonia normal, VBG with normal pH, pCO2 only minimally elevated.  - UA negative, urine culture with mixed urogenital gloria  - awake and able to answer simple questions however not oriented. Per wife he is oriented to her. Called son to discuss concern that both patient and wife have cognitive decline and unable to make safe medical decisions.    Dysphagia  -Speech following, currently on dysphagia diet     Adenocarcinoma of the right lung s/p wedge resection 2019  * Follows with [place] [facility]  * Planned for pet scan and biopsy of nodule on 1/9 was cancelled by the patient  - Oncology has previously noted that the patient is not a chemo candidate.  - Outpatient follow-up with oncology.     FDG avid soft tissue nodule in the right tongue base  * Concern for squamous cell carcinoma.   - Appointment with ENT on 1/11 was cancelled to work up the lung first.  - Outpatient follow-up with ENT.     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     COPD   Chronic hypoxic respiratory failure on 2L O2  - continue PTA inhalers     Hx of VTE  * History of subsegmental PE on 11/25/23  - Not on ac due to hx of gi bleed. Hx of IVC filter in the past, not clear if it was removed.  - Worrisome to be full code and not anticoagulated given recent subsegmental PE in 11/23. Family desire full code.     pAfib  * Not on anticoagulation due to anemia thought to be due to a GI bleed. Has history of angioextasia and diverticulosis.  - Continue PTA metoprolol     Severe aortic stenosis s/p bioprosthetic aortic valve  HTN  HLD  Elevated BNP  Chronically elevated troponin  * The patient is chest pain-free.  His EKG does not have any ST elevations or depressions. He appears dry on exam with no pitting edema and very dark urine in bedside urinal.   - Monitor on telemetry.  - Follow-up repeat troponin flat. No  "further rechecks.  - remains on florinef for hypotension     BPH  - Continue PTA Flomax.  - Bladder protocol.     History of GI bleed  GERD  Anemia  - Continue PTA famotidine, allergic to PPI     CKD 3  * Creatinine mildly elevated from previous values likely due to hypovolemia in setting of his diarrhea.  - Received IV hydration overnight.  - Repeat chemistry wnl. Scr now 1.14.     Depression/Anxiety/Pain  - Continue PTA Duloxetine - holding as NPO for dysphagia.  - Continue PTA Gabapentin 600 mg at bedtime   - holding as NPO for dysphagia.      Diet: Combination Diet Soft and Bite Sized Diet (level 6); Thin Liquids (level 0) (1:1 assist/supervision, swallow 2x every bite, alternate between foods/liquids, single sips and encourage \"chin tuck,\" fully upright for all PO - 30-60 minutes post; HOLD...Dysphagia diet as advanced by   DVT Prophylaxis: Pneumatic Compression Devices  Suazo Catheter: Not present  Lines: None     Cardiac Monitoring: None  Code Status: Full Code      Clinically Significant Risk Factors        # Hypokalemia: Lowest K = 3.1 mmol/L in last 2 days, will replace as needed           # Hypertension: Noted on problem list  # Chronic heart failure with preserved ejection fraction: heart failure noted on problem list and last echo with EF >50%           # Financial/Environmental Concerns: none  # COPD: noted on problem list        Disposition Plan      Expected Discharge Date: 01/22/2024    Discharge Delays: *Medically Ready for Discharge  Destination: inpatient rehabilitation facility  Discharge Comments: PT/OT--recommending TCU  CM/SW  Palliative care following.   Neurology consult.   Speech            Kimmie Cole DO  Hospitalist Service  [facility]  Securely message with [email or web address] (more info)  Text page via [employer] Paging/Directory   ______________________________________________________________________    Interval History   Patient is awake. He worked with PT. His speech " is garbled and he talks about someone stealing his car and is not able to answer any orientation questions.     Physical Exam   Vital Signs: Temp: 97.6  F (36.4  C) Temp src: Oral BP: (!) 141/85 Pulse: 100   Resp: 16 SpO2: 90 % O2 Device: Nasal cannula Oxygen Delivery: 2 LPM  Weight: 141 lbs 8.57 oz    Constitutional: Awake, cooperative, no apparent distress, able to answer simple questions  HEENT: poor dentition  Respiratory: Clear to auscultation bilaterally, no crackles or wheezing  Cardiovascular:  no murmur noted  GI: Normal bowel sounds, soft, non-distended, non-tender  Skin/Integumen: No rashes  Other:      Medical Decision Making       40 MINUTES SPENT BY ME on the date of service doing chart review, history, exam, documentation & further activities per the note.      Data     I have personally reviewed the following data over the past 24 hrs:    6.2  \   9.2 (L)   / 142 (L)     144 106 12.7 /  106 (H)   3.1 (L) 30 (H) 0.79 \       Imaging results reviewed over the past 24 hrs:   No results found for this or any previous visit (from the past 24 hour(s)).    Recent Labs   Lab 01/19/24  0836 01/16/24  0658 01/15/24  1855   WBC 6.2 9.3 10.7   HGB 9.2* 8.8* 11.0*   MCV 81 82 83   * 144* 164    144 140   POTASSIUM 3.1* 3.7 4.0   CHLORIDE 106 109* 100   CO2 30* 26 30*   BUN 12.7 21.0 18.3   CR 0.79 1.14 1.31*   ANIONGAP 8 9 10   AMRITA 8.4 8.9 9.6   * 107* 155*   ALBUMIN  --   --  3.6   PROTTOTAL  --   --  7.4   BILITOTAL  --   --  0.9   ALKPHOS  --   --  102   ALT  --   --  15   AST  --   --  60*   LIPASE  --   --  13

## 2024-01-20 NOTE — PLAN OF CARE
Shift Note [id number]    Orientation: Aox1 disoriented to time, situation.  Activity: A1-2 Gb/W  Diet/BS Checks: Soft mechanical bite size, 1:1 supervision with eating. Meds crushed with applesauce.   Tele:  n/a  IV Access/Drains: RPIV SL  Pain Management: Denies  Abnormal VS/Results: AVSS on 2L o2  Bowel/Bladder: incontinent, external cath in place.   Skin/Wounds: Wound to coccyx, mepi in place.   Consults: LUIS  D/C Disposition: Pending   Other Info:

## 2024-01-20 NOTE — PROGRESS NOTES
[facility]    Medicine Progress Note - Hospitalist Service    Date of Admission:  1/15/2024    Assessment & Plan   Hermilo Velasquez is a 89 year-old male with past medical history significant for recurrent C difficile colitis, history of adenocarcinoma of right lung with multiple lung nodules concerning for disease recurrence, severe aortic stenosis s/p bioprosthetic aorrtic vavle, BPH, CKDIII, history of GI bleed and COPD with chronic hypoxic respiratory failure on 2L oxygen baseline [facility] for failure to thrive in setting of diarrhea.     H/o recurrent c diff colitis  Diarrhea  * The patient started having diarrhea on January 14 prior to admission   * Previously recommended that the patient follow up at [facility] for fecal transplantation, but he has not  * ID note from 11/28/23 had recommended a prolonged taper of vancomycin. Previously he had been recommended to continue po vancomycin indefinitely until fecal transplant. *oral vancomycin was discontinued during his last admission in January due to lack of diarrhea.    - Given recurrent diarrhea on admission he was started back on PO vancomycin and should continue until seen by [facility] for fecal transplant     Failure to thrive  Goals of care  Just discharged 01/11 home after refusing TCU. Admitted that time for anemia on AC for acute PE.   *Overall this is patient's 5th admission since 08/2023 with continued ongoing decline at home. TCU recommended on every discharge but patient has refused most times    - admitted this time as patient wife reported inability to care for him at home. Patient will need TCU vs long-term care at discharge.  -TCU bed available for discharge tomorrow. Family and patient aware  - Palliative care evaluated the patient and wife wants restorative care including full code.      Altered mental status-resolved  Occasionally difficult to arouse. Also noted  to have significant cognitive deficits and memory issues  - head CT with no acute findings.  MRI brain with no acute finding.  - Lytes wnl.  Ammonia normal, VBG with normal pH, pCO2 only minimally elevated.  - UA negative, urine culture with mixed urogenital gloria  - Mentation seems to clear as the day progresses    Dysphagia  -Speech following, currently on dysphagia diet     Adenocarcinoma of the right lung s/p wedge resection 2019  * [place] [facility]  * Planned for pet scan and biopsy of nodule on 1/9 was cancelled by the patient  - Oncology has previously noted that the patient is not a chemo candidate.  - Outpatient follow-up with oncology.     FDG avid soft tissue nodule in the right tongue base  * Concern for squamous cell carcinoma.   - Appointment with ENT on 1/11 was cancelled to work up the lung first.  - Outpatient follow-up with ENT.     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     COPD   Chronic hypoxic respiratory failure on 2L O2  Remains on home oxygen leves  - continue PTA inhalers     Hx of VTE  * History of subsegmental PE on 11/25/23  - Not on ac due to gi bleed. Hx of IVC filter in the past, not clear if it was removed.     pAfib  * Not on anticoagulation due to anemia thought to be due to a GI bleed. Has history of angioextasia and diverticulosis.  - Continue PTA metoprolol     Severe aortic stenosis s/p bioprosthetic aortic valve  HTN  HLD  Elevated BNP  Chronically elevated troponin  Hx of orthostatic hypotension  * The patient is chest pain-free.  His EKG does not have any ST elevations or depressions. He appears dry on exam with no pitting edema and very dark urine in bedside urinal.   - Monitor on telemetry.  - Follow-up repeat troponin flat. No further rechecks.  - remains on florinef for hypotension     BPH  - Continue PTA Flomax.  - Bladder protocol.     History of GI bleed  GERD  Anemia  - Continue PTA  "famotidine, allergic to PPI     CKD 3  * Creatinine mildly elevated from previous values likely due to hypovolemia in setting of his diarrhea.  - Received IV hydration overnight.  - Repeat chemistry wnl. Scr now 1.14.     Depression/Anxiety/Pain  - Continue PTA Duloxetine - holding as NPO for dysphagia.  - Continue PTA Gabapentin 600 mg at bedtime   - holding as NPO for dysphagia.      Diet: Combination Diet Soft and Bite Sized Diet (level 6); Thin Liquids (level 0) (1:1 assist/supervision, swallow 2x every bite, alternate between foods/liquids, single sips and encourage \"chin tuck,\" fully upright for all PO - 30-60 minutes post; HOLD...Dysphagia diet as advanced by ST  DVT Prophylaxis: Pneumatic Compression Devices  Suazo Catheter: Not present  Lines: None     Cardiac Monitoring: None  Code Status: Full Code      Clinically Significant Risk Factors        # Hypokalemia: Lowest K = 3.1 mmol/L in last 2 days, will replace as needed           # Hypertension: Noted on problem list  # Chronic heart failure with preserved ejection fraction: heart failure noted on problem list and last echo with EF >50%           # Financial/Environmental Concerns: none  # COPD: noted on problem list        Disposition Plan      Expected Discharge Date: 01/21/2024    Discharge Delays: *Medically Ready for Discharge  Destination: inpatient rehabilitation facility  Discharge Comments: PT/OT--recommending TCU  CM/SW  Palliative care following.   Neurology consult.   Speech            Kimmie Cole DO  Hospitalist Service  [facility]  Securely message with [employer] (more info)  Text page via [employer] Paging/Directory   ______________________________________________________________________    Interval History   Visited with patient twice today. On first visit he is sleepy and just stares at me. Then on second visit he has family in the room. He was informed of plan for TCU. He doesn't disagree. He cannot elaborate on " his acute medical problems but does tell me he is feeling better and then tells me he has been here 5 days.     Physical Exam   Vital Signs: Temp: 98  F (36.7  C) Temp src: Axillary BP: (!) 164/96 Pulse: 104   Resp: 18 SpO2: 93 % O2 Device: Nasal cannula Oxygen Delivery: 2 LPM  Weight: 141 lbs 8.57 oz    Constitutional: Awake, cooperative, no apparent distress, able to answer simple questions, [place]  HEENT: poor dentition  Respiratory: Clear to auscultation bilaterally, no crackles or wheezing  Cardiovascular:  no murmur noted  GI: Normal bowel sounds, soft, non-distended, non-tender  Skin/Integumen: No rashes  Other:      Medical Decision Making       40 MINUTES SPENT BY ME on the date of service doing chart review, history, exam, documentation & further activities per the note.      Data     I have personally reviewed the following data over the past 24 hrs:    N/A  \   N/A   / N/A     N/A N/A N/A /  N/A   3.1 (L) N/A N/A \       Imaging results reviewed over the past 24 hrs:   No results found for this or any previous visit (from the past 24 hour(s)).    Recent Labs   Lab 01/20/24  0034 01/19/24  0836 01/16/24  0658 01/15/24  1855   WBC  --  6.2 9.3 10.7   HGB  --  9.2* 8.8* 11.0*   MCV  --  81 82 83   PLT  --  142* 144* 164   NA  --  144 144 140   POTASSIUM 3.1* 3.1* 3.7 4.0   CHLORIDE  --  106 109* 100   CO2  --  30* 26 30*   BUN  --  12.7 21.0 18.3   CR  --  0.79 1.14 1.31*   ANIONGAP  --  8 9 10   AMRITA  --  8.4 8.9 9.6   GLC  --  106* 107* 155*   ALBUMIN  --   --   --  3.6   PROTTOTAL  --   --   --  7.4   BILITOTAL  --   --   --  0.9   ALKPHOS  --   --   --  102   ALT  --   --   --  15   AST  --   --   --  60*   LIPASE  --   --   --  13

## 2024-01-20 NOTE — PLAN OF CARE
PRIMARY Concern: Diarrhea and weakness/failure to thrive    Date/time: 01/19/24 6401-1325  Orientation: A&O x3  Activity: A2 GB/W   Diet/BS Checks: Soft, bite-size with thin liquids, 1:1 supervision with eating. Takes meds crushed in applesauce  Tele:  N/A  IV Access/Drains: R PIV SL  Pain Management: Denies  Abnormal VS/Results: VSS on 2L NC  Bowel/Bladder: Incontinent, ext cath in place   Skin/Wounds: Wound to coccyx - mepilex in place  Consults: SW, SLP, WOC, Palliative   D/C Disposition: Pending improvement     Vannessa Woodard RN on 1/20/2024 at 6:58 AM

## 2024-01-20 NOTE — PLAN OF CARE
Goal Outcome Evaluation:    RIMARY Concern: Diarrhea and weakness/failure to thrive    Orientation: A&O x 2 disoriented to place and time  Activity: A2 GB/W   Diet/BS Checks: Soft, bite-size with thin liquids, 1:1 supervision with meal. Takes meds crushed in applesauce  Tele:  N/A  IV Access/Drains: R PIV SL  Pain Management: Denies  Abnormal VS/Results: VSS on 3L NC  Bowel/Bladder: Incontinent, ext cath in place   Skin/Wounds: Wound to coccyx - mepilex in place  Consults: SW, SLP, WOC  D/C Disposition: Pending improvement

## 2024-01-20 NOTE — PLAN OF CARE
Goal Outcome Evaluation:    PRIMARY Concern: Diarrhea and weakness/failure to thrive    Date/time: 01/19/24 7317-4957  Orientation: A&O x1  Activity: A2 GB/W   Diet/BS Checks: Soft, bite-size with thin liquids, 1:1 supervision with eating. Takes meds crushed in applesauce  Tele:  N/A  IV Access/Drains: R PIV SL  Pain Management: Denies  Abnormal VS/Results: VSS on 2L NC  Bowel/Bladder: Incontinent, ext cath in place   Skin/Wounds: Wound to coccyx - mepilex in place  Consults: SW, SLP, WOC, neuro  D/C Disposition: Pending improvement   Other Info:

## 2024-01-21 NOTE — PROGRESS NOTES
Care Management Discharge Note    Discharge Date: 01/21/2024       Discharge Disposition: Skilled Nursing Facility    Discharge Services: None    Discharge DME: None    Discharge Transportation: health plan transportation    Private pay costs discussed: Not applicable    Does the patient's insurance plan have a 3 day qualifying hospital stay waiver?  No    PAS Confirmation Code: [id number]  Patient/family educated on Medicare website which has current facility and service quality ratings: yes    Education Provided on the Discharge Plan:    Persons Notified of Discharge Plans: Patient/spouse  Patient/Family in Agreement with the Plan: yes    Handoff Referral Completed: Yes    Additional Information:  SW received discharge orders and faxed to [facility]. SW spoke with spouse who indicated she has someone who can transport and would like to transport and will bring patients transport oxygen tank. SW discussed how staff here are using [employer] for transfers. Spouse will transport between [id number]. SW spoke with [facility] and confirmed discharge plan. SW completed PAS and faxed/added to the chart.    PAS-RR    D: Per DHS regulation, SW completed and submitted PAS-RR to [place] [employer] Connect via the Senior [employer] Line.  PAS-RR confirmation # is : [id number]    I: SW spoke with patient/spouse and they are aware a PAS-RR has been submitted.  SW reviewed with patient/spouse that they may be contacted for a follow up appointment within 10 days of hospital discharge if their SNF stay is < 30 days.  Contact information for Senior [employer] Line was also provided.    A: patient/spouse verbalized understanding.    P: Further questions may be directed to Senior Link[employer] Line at #[phone], option #4 for PAS-RR staff.      Maxime Grant, SY    [employer]

## 2024-01-21 NOTE — DISCHARGE SUMMARY
[facility]  Hospitalist Discharge Summary      Date of Admission:  1/15/2024  Date of Discharge:  1/22/2024  Discharging Provider: Kimmie Cole DO  Discharge Service: Hospitalist Service    Discharge Diagnoses   See below    Clinically Significant Risk Factors          Follow-ups Needed After Discharge   Follow-up Appointments     Follow Up and recommended labs and tests      Follow up with your nursing home PCP. You also need follow up with GI   regarding you recurrent cdiff and ENT for your tongue lesion            Discharge Disposition   Discharged to home  Condition at discharge: Stable    Hospital Course   Hermilo Velasquez is a 89 year-old male with past medical history significant for recurrent C difficile colitis, history of adenocarcinoma of right lung with multiple lung nodules concerning for disease recurrence, severe aortic stenosis s/p bioprosthetic aorrtic vavle, BPH, CKDIII, history of GI bleed and COPD with chronic hypoxic respiratory failure on 2L oxygen baseline who [facility] for failure to thrive in setting of diarrhea. Remains hospitalized for supportive care and discharged to TCU.    See details below     H/o recurrent c diff colitis  Diarrhea  * The patient started having diarrhea on January 14 prior to admission   * Previously recommended that the patient follow up at [facility] for fecal transplantation, but he has not  * ID note from 11/28/23 had recommended a prolonged taper of vancomycin. Previously he had been recommended to continue po vancomycin indefinitely until fecal transplant. *oral vancomycin was discontinued during his last admission in January due to lack of diarrhea.     - Given recurrent diarrhea on admission he was started back on PO vancomycin and should continue until seen by [facility] for fecal transplant. Discharged with taper     Failure to thrive  Goals of care  Just discharged 01/11 home  after refusing TCU. Admitted that time for anemia on AC for acute PE.   *Overall this is patient's 5th admission since 08/2023 with continued ongoing decline at home. TCU recommended on every discharge but patient has refused most times     - Active PE dx 10/2023 not fully treated given GI bleed  - admitted this time as patient wife reported inability to care for him at home  - Palliative care evaluated the patient and wife wants restorative care including full code.     Altered mental status-resolved  Probable Mild Dementia with occasional agitation  Occasionally difficult to arouse. Also noted to have significant cognitive deficits and memory issues  - head CT with no acute findings.  MRI brain with no acute finding.  - Lytes wnl.  Ammonia normal, VBG with normal pH, pCO2 only minimally elevated.  - UA negative, urine culture with mixed urogenital gloria  - Mentation seems to clear as the day progresses     Dysphagia  -Speech followed, currently on dysphagia diet     Adenocarcinoma of the right lung s/p wedge resection 2019  * Follows with [facility]  * Planned for pet scan and biopsy of nodule on 1/9 was cancelled by the patient  - Oncology has previously noted that the patient is not a chemo candidate.  - Outpatient follow-up with oncology.     FDG avid soft tissue nodule in the right tongue base  * Concern for squamous cell carcinoma.   - Appointment with ENT on 1/11 was cancelled to work up the lung first.  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     COPD   Chronic hypoxic respiratory failure on 2L O2  Remains on home oxygen leves  - continue PTA inhalers     Hx of VTE  * History of subsegmental PE on 11/25/23  - Not on ac due to gi bleed. Hx of IVC filter in the past, not clear if it was removed.     pAfib  * Not on anticoagulation due to anemia thought to be due to a GI bleed. Has history of  angioextasia and diverticulosis.  - Continue PTA metoprolol     Severe aortic stenosis s/p bioprosthetic aortic valve  HTN  HLD  Elevated BNP  Chronically elevated troponin  Hx of orthostatic hypotension  - Follow-up repeat troponin flat. No further rechecks. Consider cardiology follow up after discharge once recovered from acute medical problems  - remains on florinef for hypotension     BPH  - Continue PTA Flomax.  - Bladder protocol.     History of GI bleed  GERD  Anemia  - Continue PTA famotidine, allergic to PPI     CKD 3  * Creatinine mildly elevated from previous values likely due to hypovolemia in setting of his diarrhea.     Depression/Anxiety/Pain  - Continue PTA Duloxetine.  - Continue PTA Gabapentin 600 mg at bedtime     Deep Tissue Pressure Injury to sacrum and bilateral buttocks,POA  -WOC consulted    Consultations This Hospital Stay   PHYSICAL THERAPY ADULT IP CONSULT  CARE MANAGEMENT / SOCIAL WORK IP CONSULT  PALLIATIVE CARE ADULT IP CONSULT  SPEECH LANGUAGE PATH ADULT IP CONSULT  WOUND OSTOMY CONTINENCE NURSE  IP CONSULT  NEUROLOGY IP CONSULT  VASCULAR ACCESS ADULT IP CONSULT  VASCULAR ACCESS ADULT IP CONSULT  SPEECH LANGUAGE PATH ADULT IP CONSULT  OCCUPATIONAL THERAPY ADULT IP CONSULT  PHYSICAL THERAPY ADULT IP CONSULT    Code Status   Full Code    Time Spent on this Encounter   IKimmie DO, personally saw the patient today and spent greater than 30 minutes discharging this patient.       Kimmie Cole DO  [place] [address] [facility]  [address] [place] [address] [address]  [facility] [address]  Phone: [phone]  ______________________________________________________________________    Physical Exam   Vital Signs: Temp: 98.2  F (36.8  C) Temp src: Oral BP: (!) 146/84 Pulse: 95   Resp: 18 SpO2: 93 % O2 Device: Nasal cannula Oxygen Delivery: 2 LPM  Weight: 143 lbs 8.31 oz       Primary Care Physician   Karson Bishop    Discharge Orders      Primary Care - Care Coordination Referral  "     General info for SNF    Length of Stay Estimate: Short Term Care: Estimated # of Days <30  Condition at Discharge: Stable  Level of care:skilled   Rehabilitation Potential: Fair  Admission H&P remains valid and up-to-date: Yes  Recent Chemotherapy: N/A  Use Nursing Home Standing Orders: Yes     Mantoux instructions    Give two-step Mantoux (PPD) Per Facility Policy Yes     Follow Up and recommended labs and tests    Follow up with your nursing home PCP. You also need follow up with GI regarding you recurrent cdiff and ENT for your tongue lesion     Reason for your hospital stay    You presented for weakness and diarrhea at home. You were started back on treatment for cdiff and need ongoing rehab. If you fail rehab or getting stronger you consider a more comfort approach and avoid ongoing hospitalizations     Activity - Up with assistive device     Activity - Up with nursing assistance     Speech Language Path Adult Consult    Evaluate and treat as clinically indicated.    Reason:  dysphagia     Occupational Therapy Adult Consult    Evaluate and treat as clinically indicated.    Reason:  weakness     Physical Therapy Adult Consult    Evaluate and treat as clinically indicated.    Reason:  weakness     Oxygen (SNF/TCU) Discharge     Fall precautions     Diet    Follow this diet upon discharge: Orders Placed This Encounter      Combination Diet Soft and Bite Sized Diet (level 6); Thin Liquids (level 0) (1:1 assist/supervision, swallow 2x every bite, alternate between foods/liquids, single sips and encourage \"chin tuck,\" fully upright for all PO - 30-60 minutes post; HOLD...       Significant Results and Procedures   Results for orders placed or performed during the hospital encounter of 01/15/24   XR Chest 2 Views    Narrative    EXAM: XR CHEST 2 VIEWS  LOCATION: [facility]  DATE: 1/15/2024    INDICATION: Generalized weakness  COMPARISON: 1/08/2024      Impression    IMPRESSION: Unchanged " right lower lobe consolidation with bilateral interstitial opacities and small right pleural effusion. Unchanged borderline enlarged cardiomediastinal silhouette. Median sternotomy wires and aortic valve replacement.   CT Head w/o Contrast    Narrative    CT SCAN OF THE HEAD WITHOUT CONTRAST   1/16/2024 11:40 AM     HISTORY: altered mental status    TECHNIQUE:  Axial images of the head and coronal reformations without  IV contrast material. Radiation dose for this scan was reduced using  automated exposure control, adjustment of the mA and/or kV according  to patient size, or iterative reconstruction technique.    COMPARISON: 1/8/2024    FINDINGS: Bifrontal subdural hygromas are stable. Mild chronic small  vessel ischemic changes. Mild global cortical volume loss with ex  vacuo dilatation of the ventricular system. Intracranial atheromatous  disease.    Osseous calvarium is intact. Scattered mucosal sinus disease in the  left maxillary sinus and scattered ethmoid air cells. Mastoid air  cells are clear.      Impression    IMPRESSION:   No acute findings. Chronic changes as above.      BRIDGETT SPEARS MD         SYSTEM ID:  [id number]   MR Brain w/o & w Contrast    Narrative    EXAM: MR BRAIN WITHOUT AND WITH CONTRAST  LOCATION: [facility]  DATE: 01/17/2024    INDICATION: Altered mental status.  COMPARISON: Head CT 01/16/2024. Brain MRI 06/28/2023.  CONTRAST: 7 mL Gadavist.  TECHNIQUE: Routine multiplanar multisequence head MRI without and with intravenous contrast.    FINDINGS:  INTRACRANIAL CONTENTS: No acute or subacute infarct. No mass, acute hemorrhage, or extra-axial fluid collections. Scattered nonspecific T2/FLAIR hyperintensities within the cerebral white matter most consistent with mild chronic microvascular ischemic   change. Moderate generalized cerebral atrophy. No hydrocephalus. Normal position of the cerebellar tonsils. Mild dural thickening and enhancement along the left lateral  frontal lobe, likely unchanged compared to 06/28/2023.    SELLA: No abnormality accounting for technique.    OSSEOUS STRUCTURES/SOFT TISSUES: Normal marrow signal. The major intracranial vascular flow-voids are maintained.     ORBITS: No abnormality accounting for technique.     SINUSES/MASTOIDS: No paranasal sinus mucosal disease. No middle ear or mastoid effusion.       Impression    IMPRESSION:  1.  No acute intracranial process.  2.  Mild dural thickening and enhancement along the left lateral frontal lobe, nonspecific, but likely unchanged compared to prior brain MRI 06/28/2023.  3.  Generalized brain atrophy and presumed microvascular ischemic changes as detailed above.       *Note: Due to a large number of results and/or encounters for the requested time period, some results have not been displayed. A complete set of results can be found in Results Review.       Discharge Medications   Current Discharge Medication List        START taking these medications    Details   vancomycin (FIRVANQ) 50 MG/ML oral solution Take 2.5 mLs (125 mg) by mouth 4 times daily Takes 4 times daily x 10 days original start date 01/15. Continue oral taper once complete    Associated Diagnoses: C. difficile colitis           CONTINUE these medications which have NOT CHANGED    Details   acetaminophen (TYLENOL) 500 MG tablet Take 1,000 mg by mouth 3 times daily as needed      albuterol (PROAIR HFA/PROVENTIL HFA/VENTOLIN HFA) 108 (90 Base) MCG/ACT inhaler Inhale 2 puffs into the lungs every 6 hours as needed      atorvastatin (LIPITOR) 10 MG tablet Take 1 tablet (10 mg) by mouth daily  Qty: 90 tablet, Refills: 0    Associated Diagnoses: Hyperlipidemia LDL goal <100      DULoxetine (CYMBALTA) 60 MG capsule Take 60 mg by mouth daily      famotidine (PEPCID) 40 MG tablet Take 1 tablet (40 mg) by mouth 2 times daily  Qty: 60 tablet, Refills: 0    Associated Diagnoses: Upper GI bleed; Gastroesophageal reflux disease without esophagitis     "  ferrous sulfate (FE TABS) 325 (65 Fe) MG EC tablet Take 1 tablet (325 mg) by mouth 2 times daily      fludrocortisone (FLORINEF) 0.1 MG tablet Take 1 tablet (0.1 mg) by mouth daily  Qty: 90 tablet, Refills: 3    Associated Diagnoses: Orthostatic hypotension      gabapentin (NEURONTIN) 300 MG capsule Take 2 capsules (600 mg) by mouth At Bedtime For neuropathy pain    Associated Diagnoses: Peripheral polyneuropathy      hydrOXYzine HCl (ATARAX) 25 MG tablet Take 2 tablets (50 mg) by mouth every 6 hours as needed  Qty: 360 tablet, Refills: 0    Associated Diagnoses: Chronic pruritus      tamsulosin (FLOMAX) 0.4 MG capsule Take 1 capsule (0.4 mg) by mouth daily    Associated Diagnoses: Benign prostatic hyperplasia, unspecified whether lower urinary tract symptoms present      tiotropium (SPIRIVA) 18 MCG inhaled capsule Inhale 18 mcg into the lungs daily           Allergies   Allergies   Allergen Reactions    Omeprazole Itching    Pantoprazole Itching    Prevacid [Lansoprazole] Itching    Lasix [Furosemide] Rash    Lidocaine Blisters and Rash     Allergy to lidocaine ointment  Allergy to lidocaine ointment      Penicillin G Rash    Penicillins Rash     \"broke out from injection\" 60 yrs ago  Tolerates cephalosporins     "

## 2024-01-21 NOTE — PLAN OF CARE
Discharge Note    Patient discharged to TCU via private vehicle  accompanied by significant other  and friend.  IV: Discontinued  Prescriptions sent with patient to discharge facility .   Belongings reviewed and sent with patient.   Home medications returned to patient: NA  Equipment sent with: patient.   patient verbalizes understanding of discharge instructions. AVS given to patient.

## 2024-01-22 NOTE — LETTER
1/22/2024        RE: Hermilo Velasquez  [address]  [facility] [address]        [facility]    PRIMARY CARE PROVIDER AND CLINIC:  Karson Bishop MD, [address] / [place] [address]  Chief Complaint   Patient presents with     RECHECK      [facility] Medical Record Number:  [id number]  Place of Service where encounter took place:  [facility] (TCU) [25]    Hermilo Velasquez  is a 89 year old  (11/3/1932), admitted to the above facility from  [facility]. Hospital stay 1/15/24 through 1/21/24..   HPI:    PMH of recurrent C-diff colitis, Hx of adenocarcinoma of right lung with multiple lung nodules concerning for reoccurence, severe aortic stenosis s/p bioprosthetic AVR, BPH, CKD, Hx of GI bleed and COPD on chronic O2 who presented with failure to thrive in setting of diarrhea  Recurrent C-diff colitis  Diarrhea started 1/14/24 previous recommendations for f/u at [facility] for fecal transplant but patient has not followed up. Will continue PO vancomycin until f/u with [facility]  Failure to thrive  Goals of care  Patient has had 5 admissions since 8/2023, patient has declined recommendations of TCU placement after all 5 discharges. Patient wife reports she is unable to care for her  and placement is needed, palliative care consult resulted in patient/family wanting to continue with restorative care and full code status.   Altered mental status: occasionally difficult to arouse, mentation clears as day progresses, workup including CT of head with no acute findings  Dysphagia: SLP following, currently on dysphagia diet.   Adenocarcinoma of right lung s/p wedge resection 2019  Followed by [facility], PET scan and biopsy of nodule was scheduled for 1/9/24 patient cancelled, oncology note that patient is not a candidate for chemotherapy, will need OP f/u with oncology  FDG avid soft tissue nodule in right tongue  Patient was scheduled for f/u appt  "1/11 with ENT, was cancelled by patient, will need OP f/u with ENT  Other chronic conditions relatively stable  ON exam today: patient is resting in bed, denies pain or discomfort, SOB, cough, congestion, CP, palpitations, states he has some occasional nausea and ongoing diarrhea, difficult to tell how often loose stools, patient states his appetite is good.           CODE STATUS/ADVANCE DIRECTIVES DISCUSSION:  Prior  CPR/Full code   ALLERGIES:   Allergies   Allergen Reactions     Omeprazole Itching     Pantoprazole Itching     Prevacid [Lansoprazole] Itching     Lasix [Furosemide] Rash     Lidocaine Blisters and Rash     Allergy to lidocaine ointment  Allergy to lidocaine ointment       Penicillin G Rash     Penicillins Rash     \"broke out from injection\" 60 yrs ago  Tolerates cephalosporins      PAST MEDICAL HISTORY:   Past Medical History:   Diagnosis Date     Adenocarcinoma, lung, 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 Vasques      Aortic stenosis     Severe AS, 9/2015 AVR - [facility] TRIFECTA Bovine bioprosthesis 25MM [id number]     Atrial fibrillation (H)     9/2015 Paroxysmal post op Afib - discharged on Warfarin and a beta blocker     COPD (chronic obstructive pulmonary disease) (H)      Deep vein thrombosis (H)      GERD (gastroesophageal reflux disease)      Heart murmur      Monoclonal gammopathy     plasmacyte prominent causing monoclonal gammopathy     Need for SBE (subacute bacterial endocarditis) prophylaxis      Neuropathy      Other and unspecified hyperlipidemia      Other malignant lymphomas     non hodgkin's lymphoma     RBBB (right bundle branch block)      Severe sepsis with acute organ dysfunction (H) 11/16/2015     Unspecified hereditary and idiopathic peripheral neuropathy       PAST SURGICAL HISTORY:   has a past surgical history that includes appendectomy; tonsillectomy; knee surgery; rotator cuff repair rt/lt; turp; hernia repair (2006); Spine surgery; Repair ligament ankle " (2/23/2012); Herniorrhaphy ventral (4/17/2013); Replace valve aortic (N/A, 9/3/2015); Esophagoscopy, gastroscopy, duodenoscopy (EGD), combined (N/A, 11/28/2015); TOTAL KNEE ARTHROPLASTY; back surgery; Esophagoscopy, gastroscopy, duodenoscopy (EGD), combined (N/A, 7/26/2018); Thoracotomy, wedge resection lung, combined (Right, 3/26/2019); Lobectomy lung (Right, 3/26/2019); Esophagoscopy, gastroscopy, duodenoscopy (EGD), combined (N/A, 8/17/2020); Esophagoscopy, gastroscopy, duodenoscopy (EGD), combined (N/A, 10/24/2020); Esophagoscopy, gastroscopy, duodenoscopy (EGD), combined (N/A, 4/11/2022); Arthroplasty knee (Right, 7/25/2022); and Esophagoscopy, gastroscopy, duodenoscopy (EGD), combined (N/A, 8/26/2022).  FAMILY HISTORY: family history includes C.A.D. in his father; Emphysema in his father.  SOCIAL HISTORY:   reports that he quit smoking about 26 years ago. His smoking use included cigarettes. He has a 110 pack-year smoking history. He has never used smokeless tobacco. He reports that he does not currently use alcohol. He reports that he does not use drugs.  Patient's living condition: lives with spouse    Post Discharge Medication Reconciliation Status:   MED REC REQUIRED  Post Medication Reconciliation Status: discharge medications reconciled and changed, per note/orders       Current Outpatient Medications   Medication Sig     acetaminophen (TYLENOL) 500 MG tablet Take 1,000 mg by mouth 3 times daily as needed     albuterol (PROAIR HFA/PROVENTIL HFA/VENTOLIN HFA) 108 (90 Base) MCG/ACT inhaler Inhale 2 puffs into the lungs every 6 hours as needed     atorvastatin (LIPITOR) 10 MG tablet Take 1 tablet (10 mg) by mouth daily     DULoxetine (CYMBALTA) 60 MG capsule Take 60 mg by mouth daily     famotidine (PEPCID) 40 MG tablet Take 1 tablet (40 mg) by mouth 2 times daily     ferrous sulfate (FE TABS) 325 (65 Fe) MG EC tablet Take 1 tablet (325 mg) by mouth 2 times daily     fludrocortisone (FLORINEF) 0.1 MG tablet  "Take 1 tablet (0.1 mg) by mouth daily     gabapentin (NEURONTIN) 300 MG capsule Take 2 capsules (600 mg) by mouth At Bedtime For neuropathy pain     hydrOXYzine HCl (ATARAX) 25 MG tablet Take 2 tablets (50 mg) by mouth every 6 hours as needed (Patient taking differently: Take 25 mg by mouth every 6 hours as needed)     tamsulosin (FLOMAX) 0.4 MG capsule Take 1 capsule (0.4 mg) by mouth daily     tiotropium (SPIRIVA) 18 MCG inhaled capsule Inhale 18 mcg into the lungs daily     vancomycin (FIRVANQ) 50 MG/ML oral solution Take 2.5 mLs (125 mg) by mouth 4 times daily Takes 4 times daily x 10 days original start date 01/15. Continue oral taper once complete     No current facility-administered medications for this visit.       ROS:  10 point ROS of systems including Constitutional, Eyes, Respiratory, Cardiovascular, Gastroenterology, Genitourinary, Integumentary, Musculoskeletal, Psychiatric were all negative except for pertinent positives noted in my HPI.    Vitals:  BP (!) 142/65   Pulse 96   Temp 99.2  F (37.3  C)   Resp 16   Ht 1.778 m (5' 10\")   Wt 68 kg (150 lb)   SpO2 90%   BMI 21.52 kg/m    Exam:  GENERAL APPEARANCE:  Alert, in no distress  ENT:  Mouth and posterior oropharynx normal, moist mucous membranes, Mentasta  EYES:  EOM, conjunctivae, lids, pupils and irises normal, PERRL  RESP:  respiratory effort and palpation of chest normal, lungs clear to auscultation , no respiratory distress  CV:  Palpation and auscultation of heart done , regular rate and rhythm, no murmur, rub, or gallop, no edema  ABDOMEN:  normal bowel sounds, soft, nontender, no hepatosplenomegaly or other masses  M/S:   patient resting in bed  SKIN:  Inspection of skin and subcutaneous tissue baseline  NEURO:   speech wnl  PSYCH:  affect and mood normal    Lab/Diagnostic data:  Recent labs in [facility] reviewed by me today.  and Most Recent 3 CBC's:  Recent Labs   Lab Test 01/19/24  0836 01/16/24  0658 01/15/24  1855   WBC 6.2 9.3 10.7   HGB " 9.2* 8.8* 11.0*   MCV 81 82 83   * 144* 164     Most Recent 3 BMP's:  Recent Labs   Lab Test 01/20/24  1045 01/20/24  0034 01/19/24  0836 01/16/24  0658 01/15/24  1855   NA  --   --  144 144 140   POTASSIUM 3.6 3.1* 3.1* 3.7 4.0   CHLORIDE  --   --  106 109* 100   CO2  --   --  30* 26 30*   BUN  --   --  12.7 21.0 18.3   CR  --   --  0.79 1.14 1.31*   ANIONGAP  --   --  8 9 10   AMRITA  --   --  8.4 8.9 9.6   GLC  --   --  106* 107* 155*       ASSESSMENT/PLAN:    (A04.72) Colitis due to Clostridium difficile  (primary encounter diagnosis)  (R62.7) Failure to thrive in adult  (R53.81) Physical deconditioning  (E44.0) Moderate protein-calorie malnutrition (H24)  Comment: acute/ongoing  Plan: PT and OT, dietician to follow, vancomycin 125mg QID for 10 days, continue to taper  F/u with [facility] as OP    (R13.10) Dysphagia, unspecified type  Comment: acute/ongoing  Plan: SLP to follow, continue dysphagia diet    (C34.91) Adenocarcinoma of right lung (H)  Comment: acute/ongoing  Plan: f/u with [facility] as OP    (R22.0) Nodule of tongue  Comment: acute/ongoing  Plan: f/u with ENT as OP    (I10) Essential hypertension with goal blood pressure less than 140/90  (I48.0) Paroxysmal atrial fib -- on Eliquis   (N18.30) Stage 3 chronic kidney disease, unspecified whether stage 3a or 3b CKD (H)  Comment: acute/ongoing  Plan: BMP follow, continue to follow off Rx, avoid nephrotoxic agents    (J44.0) Chronic obstructive pulmonary disease with acute lower respiratory infection (H)  Comment: ongoing  Plan: on chronic O2, continue spiriva 18mcg QD and albuterol MDI 2 puffs q 6 hours prn    (R41.89) Cognitive impairment  Comment: ongoing  Plan: OT eval, continue to monitor    (D50.0) Iron deficiency anemia due to chronic blood loss  Comment: acute/ongoing  Plan: CBC follow    (R11.0) Nausea  Acute/Ongoing  Plan: zofran 4mg q 6 hours prn      Orders:  CBC and BMP on thursday  Zofran 4mg q 6 hours prn      Total time spent with  patient visit at the [facility] nursing facility was 45 min including patient visit and review of past records. Reviewed internal medicine progress notes, lab and imaging results, medication changes, discussed hospitalization with patient at bedside    Electronically signed by:  Tonya Lynn Haase, APRN CNP                   Sincerely,        Tonya Lynn Haase, APRN CNP

## 2024-01-22 NOTE — PROGRESS NOTES
Speech Language Therapy Discharge Summary    Reason for therapy discharge:    Discharged to transitional care facility.    Progress towards therapy goal(s). See goals on Care Plan in [facility] electronic health record for goal details.  Goals partially met.  Barriers to achieving goals:   discharge from facility.    Therapy recommendation(s):    See below       01/19/24 1400   SLP Discharge Planning   SLP Plan diet tolerance, strategy use with cues, trial upgrades if more appropriate   SLP Discharge Recommendation skilled nursing facility   SLP Rationale for DC Rec Pt with chronic dysphagia, nearing baseline ability   SLP Brief overview of current status  Recommend a soft and bite sized diet (IDDSI 6) with small sips of thin liquid (no straws). Pt should sit upright, take one small sip at a time & avoid straws. Caregivers can cue for small sip with chin tuck, pt unable to recall independently.

## 2024-01-22 NOTE — PROGRESS NOTES
[facility] GERIATRICS    PRIMARY CARE PROVIDER AND CLINIC:  Karson Bishop MD, [address] [facility] [address] / [place] [address]  Chief Complaint   Patient presents with    RECHECK      [place] Medical Record Number:  [id number]  Place of Service where encounter took place:  [facility] (TCU) [25]    Hermilo Velasquez  is a 89 year old  (11/3/1932), admitted to the above facility from  [facility]. Hospital stay 1/15/24 through 1/21/24..   HPI:    PMH of recurrent C-diff colitis, Hx of adenocarcinoma of right lung with multiple lung nodules concerning for reoccurence, severe aortic stenosis s/p bioprosthetic AVR, BPH, CKD, Hx of GI bleed and COPD on chronic O2 who presented with failure to thrive in setting of diarrhea  Recurrent C-diff colitis  Diarrhea started 1/14/24 previous recommendations for f/u at [facility] for fecal transplant but patient has not followed up. Will continue PO vancomycin until f/u with [facility]  Failure to thrive  Goals of care  Patient has had 5 admissions since 8/2023, patient has declined recommendations of TCU placement after all 5 discharges. Patient wife reports she is unable to care for her  and placement is needed, palliative care consult resulted in patient/family wanting to continue with restorative care and full code status.   Altered mental status: occasionally difficult to arouse, mentation clears as day progresses, workup including CT of head with no acute findings  Dysphagia: SLP following, currently on dysphagia diet.   Adenocarcinoma of right lung s/p wedge resection 2019  Followed by [facility], PET scan and biopsy of nodule was scheduled for 1/9/24 patient cancelled, oncology note that patient is not a candidate for chemotherapy, will need OP f/u with oncology  FDG avid soft tissue nodule in right tongue  Patient was scheduled for f/u appt 1/11 with ENT, was cancelled by patient, will need OP f/u with ENT  Other chronic conditions  "relatively stable  ON exam today: patient is resting in bed, denies pain or discomfort, SOB, cough, congestion, CP, palpitations, states he has some occasional nausea and ongoing diarrhea, difficult to tell how often loose stools, patient states his appetite is good.           CODE STATUS/ADVANCE DIRECTIVES DISCUSSION:  Prior  CPR/Full code   ALLERGIES:   Allergies   Allergen Reactions    Omeprazole Itching    Pantoprazole Itching    Prevacid [Lansoprazole] Itching    Lasix [Furosemide] Rash    Lidocaine Blisters and Rash     Allergy to lidocaine ointment  Allergy to lidocaine ointment      Penicillin G Rash    Penicillins Rash     \"broke out from injection\" 60 yrs ago  Tolerates cephalosporins      PAST MEDICAL HISTORY:   Past Medical History:   Diagnosis Date    Adenocarcinoma, lung, 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 Vasques     Aortic stenosis     Severe AS, 9/2015 AVR - [facility] TRIFECTA Bovine bioprosthesis 25MM TF-25A    Atrial fibrillation (H)     9/2015 Paroxysmal post op Afib - discharged on Warfarin and a beta blocker    COPD (chronic obstructive pulmonary disease) (H)     Deep vein thrombosis (H)     GERD (gastroesophageal reflux disease)     Heart murmur     Monoclonal gammopathy     plasmacyte prominent causing monoclonal gammopathy    Need for SBE (subacute bacterial endocarditis) prophylaxis     Neuropathy     Other and unspecified hyperlipidemia     Other malignant lymphomas     non hodgkin's lymphoma    RBBB (right bundle branch block)     Severe sepsis with acute organ dysfunction (H) 11/16/2015    Unspecified hereditary and idiopathic peripheral neuropathy       PAST SURGICAL HISTORY:   has a past surgical history that includes appendectomy; tonsillectomy; knee surgery; rotator cuff repair rt/lt; turp; hernia repair (2006); Spine surgery; Repair ligament ankle (2/23/2012); Herniorrhaphy ventral (4/17/2013); Replace valve aortic (N/A, 9/3/2015); Esophagoscopy, gastroscopy, " duodenoscopy (EGD), combined (N/A, 11/28/2015); TOTAL KNEE ARTHROPLASTY; back surgery; Esophagoscopy, gastroscopy, duodenoscopy (EGD), combined (N/A, 7/26/2018); Thoracotomy, wedge resection lung, combined (Right, 3/26/2019); Lobectomy lung (Right, 3/26/2019); Esophagoscopy, gastroscopy, duodenoscopy (EGD), combined (N/A, 8/17/2020); Esophagoscopy, gastroscopy, duodenoscopy (EGD), combined (N/A, 10/24/2020); Esophagoscopy, gastroscopy, duodenoscopy (EGD), combined (N/A, 4/11/2022); Arthroplasty knee (Right, 7/25/2022); and Esophagoscopy, gastroscopy, duodenoscopy (EGD), combined (N/A, 8/26/2022).  FAMILY HISTORY: family history includes C.A.D. in his father; Emphysema in his father.  SOCIAL HISTORY:   reports that he quit smoking about 26 years ago. His smoking use included cigarettes. He has a 110 pack-year smoking history. He has never used smokeless tobacco. He reports that he does not currently use alcohol. He reports that he does not use drugs.  Patient's living condition: lives with spouse    Post Discharge Medication Reconciliation Status:   MED REC REQUIRED  Post Medication Reconciliation Status: discharge medications reconciled and changed, per note/orders       Current Outpatient Medications   Medication Sig    acetaminophen (TYLENOL) 500 MG tablet Take 1,000 mg by mouth 3 times daily as needed    albuterol (PROAIR HFA/PROVENTIL HFA/VENTOLIN HFA) 108 (90 Base) MCG/ACT inhaler Inhale 2 puffs into the lungs every 6 hours as needed    atorvastatin (LIPITOR) 10 MG tablet Take 1 tablet (10 mg) by mouth daily    DULoxetine (CYMBALTA) 60 MG capsule Take 60 mg by mouth daily    famotidine (PEPCID) 40 MG tablet Take 1 tablet (40 mg) by mouth 2 times daily    ferrous sulfate (FE TABS) 325 (65 Fe) MG EC tablet Take 1 tablet (325 mg) by mouth 2 times daily    fludrocortisone (FLORINEF) 0.1 MG tablet Take 1 tablet (0.1 mg) by mouth daily    gabapentin (NEURONTIN) 300 MG capsule Take 2 capsules (600 mg) by mouth At  "Bedtime For neuropathy pain    hydrOXYzine HCl (ATARAX) 25 MG tablet Take 2 tablets (50 mg) by mouth every 6 hours as needed (Patient taking differently: Take 25 mg by mouth every 6 hours as needed)    tamsulosin (FLOMAX) 0.4 MG capsule Take 1 capsule (0.4 mg) by mouth daily    tiotropium (SPIRIVA) 18 MCG inhaled capsule Inhale 18 mcg into the lungs daily    vancomycin (FIRVANQ) 50 MG/ML oral solution Take 2.5 mLs (125 mg) by mouth 4 times daily Takes 4 times daily x 10 days original start date 01/15. Continue oral taper once complete     No current facility-administered medications for this visit.       ROS:  10 point ROS of systems including Constitutional, Eyes, Respiratory, Cardiovascular, Gastroenterology, Genitourinary, Integumentary, Musculoskeletal, Psychiatric were all negative except for pertinent positives noted in my HPI.    Vitals:  BP (!) 142/65   Pulse 96   Temp 99.2  F (37.3  C)   Resp 16   Ht 1.778 m (5' 10\")   Wt 68 kg (150 lb)   SpO2 90%   BMI 21.52 kg/m    Exam:  GENERAL APPEARANCE:  Alert, in no distress  ENT:  Mouth and posterior oropharynx normal, moist mucous membranes, [place]  EYES:  EOM, conjunctivae, lids, pupils and irises normal, PERRL  RESP:  respiratory effort and palpation of chest normal, lungs clear to auscultation , no respiratory distress  CV:  Palpation and auscultation of heart done , regular rate and rhythm, no murmur, rub, or gallop, no edema  ABDOMEN:  normal bowel sounds, soft, nontender, no hepatosplenomegaly or other masses  M/S:   patient resting in bed  SKIN:  Inspection of skin and subcutaneous tissue baseline  NEURO:   speech wnl  PSYCH:  affect and mood normal    Lab/Diagnostic data:  Recent labs in [facility] reviewed by me today.  and Most Recent 3 CBC's:  Recent Labs   Lab Test 01/19/24  0836 01/16/24  0658 01/15/24  1855   WBC 6.2 9.3 10.7   HGB 9.2* 8.8* 11.0*   MCV 81 82 83   * 144* 164     Most Recent 3 BMP's:  Recent Labs   Lab Test 01/20/24  1045 " 01/20/24  0034 01/19/24  0836 01/16/24  0658 01/15/24  1855   NA  --   --  144 144 140   POTASSIUM 3.6 3.1* 3.1* 3.7 4.0   CHLORIDE  --   --  106 109* 100   CO2  --   --  30* 26 30*   BUN  --   --  12.7 21.0 18.3   CR  --   --  0.79 1.14 1.31*   ANIONGAP  --   --  8 9 10   AMRITA  --   --  8.4 8.9 9.6   GLC  --   --  106* 107* 155*       ASSESSMENT/PLAN:    (A04.72) Colitis due to Clostridium difficile  (primary encounter diagnosis)  (R62.7) Failure to thrive in adult  (R53.81) Physical deconditioning  (E44.0) Moderate protein-calorie malnutrition (H24)  Comment: acute/ongoing  Plan: PT and OT, dietician to follow, vancomycin 125mg QID for 10 days, continue to taper  F/u with [facility] as OP    (R13.10) Dysphagia, unspecified type  Comment: acute/ongoing  Plan: SLP to follow, continue dysphagia diet    (C34.91) Adenocarcinoma of right lung (H)  Comment: acute/ongoing  Plan: f/u with [facility] as OP    (R22.0) Nodule of tongue  Comment: acute/ongoing  Plan: f/u with ENT as OP    (I10) Essential hypertension with goal blood pressure less than 140/90  (I48.0) Paroxysmal atrial fib -- on Eliquis   (N18.30) Stage 3 chronic kidney disease, unspecified whether stage 3a or 3b CKD (H)  Comment: acute/ongoing  Plan: BMP follow, continue to follow off Rx, avoid nephrotoxic agents    (J44.0) Chronic obstructive pulmonary disease with acute lower respiratory infection (H)  Comment: ongoing  Plan: on chronic O2, continue spiriva 18mcg QD and albuterol MDI 2 puffs q 6 hours prn    (R41.89) Cognitive impairment  Comment: ongoing  Plan: OT eval, continue to monitor    (D50.0) Iron deficiency anemia due to chronic blood loss  Comment: acute/ongoing  Plan: CBC follow    (R11.0) Nausea  Acute/Ongoing  Plan: zofran 4mg q 6 hours prn      Orders:  CBC and BMP on thursday  Zofran 4mg q 6 hours prn      Total time spent with patient visit at the skilled nursing facility was 45 min including patient visit and review of past records. Reviewed  internal medicine progress notes, lab and imaging results, medication changes, discussed hospitalization with patient at bedside    Electronically signed by:  Tonya Lynn Haase, APRN CNP

## 2024-01-22 NOTE — PROGRESS NOTES
Clinic Care Coordination Contact  Care Coordination Transition Communication    Clinical Data: Patient was hospitalized at [facility] from 1/15/24 to 1/21/24 with diagnosis of failure to thrive in setting of diarrhea.     Assessment: Patient has transitioned to TCU/ARU for short term rehabilitation:    Facility [facility]: [facility]  Transition Communication:  Notified facility of Primary Care- Care Coordination support via Epic fax.    Plan: Care Coordinator will await notification from facility staff informing of patient's discharge plans/needs. Care Coordinator will review chart and outreach to facility staff every 4 weeks and as needed.     Louisa Shelley,  [facility]  Clinic Care Coordinator  [facility]  [phone]  [email or web address]

## 2024-01-22 NOTE — LETTER
[facility]   To:   Adele PAULINOU SW          Please give to facility    From:   Louisa Shelley  [facility]  Care Coordinator   [facility]   P: [phone]   [email or web address]   Patient Name:  Hermilo Velasquez YOB: 1932   Admit date: 1/21/24      *Information Needed:  Please contact me when the patient will discharge (or if they will move to long term care)- include the discharge date, disposition, and main diagnosis   If the patient is discharged with home care services, please provide the name of the agency    Also- Please inform me if a care conference is being held.   Phone, Fax or Email with information                              Thank you     Louisa Shelley  [facility]  Clinic Care Coordinator  [facility]  [phone]  [email or web address]

## 2024-01-23 NOTE — PROGRESS NOTES
"[facility] GERIATRICS    Chief Complaint   Patient presents with    RECHECK     HPI:  Hermilo Velasquez is a 89 year old  (11/3/1932), who is being seen today for an episodic care visit at: [facility]) [25]. Today's concern is:   Left hand pain: nursing report patient having pain and redness in left hand, patient is not using his left hand.   On exam patient has swelling and redness over left middle knuckles, point tenderness with palpation to left knuckle area.   C-diff/failure to thrive/malnutrition: nursing report formed stool, weight 150.8lbs on admission, patient eating and drinking, occasional coughing with drinking fluids per nursing notes.   Lung cancer/COPD:  SaO2 91% on O2/nc, patient denies SOB,cough, congestio  HTN/atrial fib/CKD: BP as follows 92/64, 135/80, 142/65 with HR 80-90 range, denies CP, palpitations  Cognitive impairment: BIMS 13/15 and SBT 14/28    Allergies, and PMH/PSH reviewed in [facility] today.  REVIEW OF SYSTEMS:  10 point ROS of systems including Constitutional, Eyes, Respiratory, Cardiovascular, Gastroenterology, Genitourinary, Integumentary, Musculoskeletal, Psychiatric were all negative except for pertinent positives noted in my HPI.    Objective:   BP 92/64   Pulse 93   Temp 98  F (36.7  C)   Resp 18   Ht 1.778 m (5' 10\")   Wt 68 kg (150 lb)   SpO2 91%   BMI 21.52 kg/m    GENERAL APPEARANCE:  Alert, in no distress  ENT:  Mouth and posterior oropharynx normal, moist mucous membranes, [place]  EYES:  EOM, conjunctivae, lids, pupils and irises normal, PERRL  RESP:  respiratory effort and palpation of chest normal, no respiratory distress, diminished breath sounds crackles bases   CV:  Palpation and auscultation of heart done , regular rate and rhythm, no murmur, rub, or gallop, no edema  ABDOMEN:  normal bowel sounds, soft, nontender, no hepatosplenomegaly or other masses  M/S:   patient resting in bed, left hand knuckles swollen and red, painful to palpation  SKIN:  " Inspection of skin and subcutaneous tissue baseline  NEURO:   speech wnl  PSYCH:  affect and mood normal    Recent labs in [facility] reviewed by me today.  and Most Recent 3 CBC's:  Recent Labs   Lab Test 01/19/24  0836 01/16/24  0658 01/15/24  1855   WBC 6.2 9.3 10.7   HGB 9.2* 8.8* 11.0*   MCV 81 82 83   * 144* 164     Most Recent 3 BMP's:  Recent Labs   Lab Test 01/20/24  1045 01/20/24  0034 01/19/24  0836 01/16/24  0658 01/15/24  1855   NA  --   --  144 144 140   POTASSIUM 3.6 3.1* 3.1* 3.7 4.0   CHLORIDE  --   --  106 109* 100   CO2  --   --  30* 26 30*   BUN  --   --  12.7 21.0 18.3   CR  --   --  0.79 1.14 1.31*   ANIONGAP  --   --  8 9 10   AMRITA  --   --  8.4 8.9 9.6   GLC  --   --  106* 107* 155*       Assessment/Plan:    (M79.642) pain in left hand  Acute/ongoing  Plan: 2 view x-ray of left hand, RICE therapy    (A04.72) Colitis due to Clostridium difficile  (primary encounter diagnosis)  (R62.7) Failure to thrive in adult  (R53.81) Physical deconditioning  (E44.0) Moderate protein-calorie malnutrition (H24)  Comment: acute/ongoing  Plan: PT and OT, dietician to follow, vancomycin 125mg QID for 10 days, continue to taper on 1/26/24 will decrease to 125mg TID for 7 days  F/u with [facility] as OP     (R13.10) Dysphagia, unspecified type  Comment: acute/ongoing, no change  Plan: SLP to follow, continue dysphagia diet     (C34.91) Adenocarcinoma of right lung (H)  Comment: acute/ongoing, no change  Plan: f/u with [facility] as OP     (R22.0) Nodule of tongue  Comment: acute/ongoing, no change  Plan: f/u with ENT as OP     (I10) Essential hypertension with goal blood pressure less than 140/90  (I48.0) Paroxysmal atrial fib -- on Eliquis   (N18.30) Stage 3 chronic kidney disease, unspecified whether stage 3a or 3b CKD (H)  Comment: acute/ongoing, no change  Plan: BMP follow, continue to follow off Rx, avoid nephrotoxic agents     (J44.0) Chronic obstructive pulmonary disease with acute lower respiratory infection  (H)  Comment: ongoing, no change  Plan: on chronic O2, continue spiriva 18mcg QD and albuterol MDI 2 puffs q 6 hours prn     (R41.89) Cognitive impairment  Comment: ongoing, no change  Plan: OT eval, continue to monitor     (D50.0) Iron deficiency anemia due to chronic blood loss  Comment: acute/ongoing, no change  Plan: CBC follow       MED REC REQUIRED  Post Medication Reconciliation Status: medication reconcilation previously completed during another office visit      Orders:  2 view x-ray of left hand  On 1/26/24 decrease vancomycin to 125mg TID for 7 days      Electronically signed by: Tonya Lynn Haase, APRN CNP

## 2024-01-23 NOTE — LETTER
"    1/23/2024        RE: Hermilo Velasquez  [address] [place] [address]        [facility] GERIATRICS    Chief Complaint   Patient presents with     RECHECK     HPI:  Hermilo Velasquez is a 89 year old  (11/3/1932), who is being seen today for an episodic care visit at: [facility]) [25]. Today's concern is:   Left hand pain: nursing report patient having pain and redness in left hand, patient is not using his left hand.   On exam patient has swelling and redness over left middle knuckles, point tenderness with palpation to left knuckle area.   C-diff/failure to thrive/malnutrition: nursing report formed stool, weight 150.8lbs on admission, patient eating and drinking, occasional coughing with drinking fluids per nursing notes.   Lung cancer/COPD:  SaO2 91% on O2/nc, patient denies SOB,cough, congestio  HTN/atrial fib/CKD: BP as follows 92/64, 135/80, 142/65 with HR 80-90 range, denies CP, palpitations  Cognitive impairment: BIMS 13/15 and SBT 14/28    Allergies, and PMH/PSH reviewed in [facility] today.  REVIEW OF SYSTEMS:  10 point ROS of systems including Constitutional, Eyes, Respiratory, Cardiovascular, Gastroenterology, Genitourinary, Integumentary, Musculoskeletal, Psychiatric were all negative except for pertinent positives noted in my HPI.    Objective:   BP 92/64   Pulse 93   Temp 98  F (36.7  C)   Resp 18   Ht 1.778 m (5' 10\")   Wt 68 kg (150 lb)   SpO2 91%   BMI 21.52 kg/m    GENERAL APPEARANCE:  Alert, in no distress  ENT:  Mouth and posterior oropharynx normal, moist mucous membranes, [place]  EYES:  EOM, conjunctivae, lids, pupils and irises normal, PERRL  RESP:  respiratory effort and palpation of chest normal, no respiratory distress, diminished breath sounds crackles bases   CV:  Palpation and auscultation of heart done , regular rate and rhythm, no murmur, rub, or gallop, no edema  ABDOMEN:  normal bowel sounds, soft, nontender, no hepatosplenomegaly or other masses  M/S: "   patient resting in bed, left hand knuckles swollen and red, painful to palpation  SKIN:  Inspection of skin and subcutaneous tissue baseline  NEURO:   speech wnl  PSYCH:  affect and mood normal    Recent labs in [facility] reviewed by me today.  and Most Recent 3 CBC's:  Recent Labs   Lab Test 01/19/24  0836 01/16/24  0658 01/15/24  1855   WBC 6.2 9.3 10.7   HGB 9.2* 8.8* 11.0*   MCV 81 82 83   * 144* 164     Most Recent 3 BMP's:  Recent Labs   Lab Test 01/20/24  1045 01/20/24  0034 01/19/24  0836 01/16/24  0658 01/15/24  1855   NA  --   --  144 144 140   POTASSIUM 3.6 3.1* 3.1* 3.7 4.0   CHLORIDE  --   --  106 109* 100   CO2  --   --  30* 26 30*   BUN  --   --  12.7 21.0 18.3   CR  --   --  0.79 1.14 1.31*   ANIONGAP  --   --  8 9 10   AMRITA  --   --  8.4 8.9 9.6   GLC  --   --  106* 107* 155*       Assessment/Plan:    (M79.642) pain in left hand  Acute/ongoing  Plan: 2 view x-ray of left hand, RICE therapy    (A04.72) Colitis due to Clostridium difficile  (primary encounter diagnosis)  (R62.7) Failure to thrive in adult  (R53.81) Physical deconditioning  (E44.0) Moderate protein-calorie malnutrition (H24)  Comment: acute/ongoing  Plan: PT and OT, dietician to follow, vancomycin 125mg QID for 10 days, continue to taper on 1/26/24 will decrease to 125mg TID for 7 days  F/u with [facility] as [facility]     (R13.10) Dysphagia, unspecified type  Comment: acute/ongoing, no change  Plan: SLP to follow, continue dysphagia diet     (C34.91) Adenocarcinoma of right lung (H)  Comment: acute/ongoing, no change  Plan: f/u with [facility] as OP     (R22.0) Nodule of tongue  Comment: acute/ongoing, no change  Plan: f/u with ENT as OP     (I10) Essential hypertension with goal blood pressure less than 140/90  (I48.0) Paroxysmal atrial fib -- on Eliquis   (N18.30) Stage 3 chronic kidney disease, unspecified whether stage 3a or 3b CKD (H)  Comment: acute/ongoing, no change  Plan: BMP follow, continue to follow off Rx, avoid nephrotoxic  agents     (J44.0) Chronic obstructive pulmonary disease with acute lower respiratory infection (H)  Comment: ongoing, no change  Plan: on chronic O2, continue spiriva 18mcg QD and albuterol MDI 2 puffs q 6 hours prn     (R41.89) Cognitive impairment  Comment: ongoing, no change  Plan: OT eval, continue to monitor     (D50.0) Iron deficiency anemia due to chronic blood loss  Comment: acute/ongoing, no change  Plan: CBC follow       MED REC REQUIRED  Post Medication Reconciliation Status: medication reconcilation previously completed during another office visit      Orders:  2 view x-ray of left hand  On 1/26/24 decrease vancomycin to 125mg TID for 7 days      Electronically signed by: Tonya Lynn Haase, APRN CNP         Sincerely,        Tonya Lynn Haase, APRN CNP

## 2024-01-24 NOTE — TELEPHONE ENCOUNTER
"[facility] Geriatrics Lab Note     Provider: Tonya Haase, APRN CNP  Facility: [facility] Facility Type:  TCU    Allergies   Allergen Reactions    Omeprazole Itching    Pantoprazole Itching    Prevacid [Lansoprazole] Itching    Lasix [Furosemide] Rash    Lidocaine Blisters and Rash     Allergy to lidocaine ointment  Allergy to lidocaine ointment      Penicillin G Rash    Penicillins Rash     \"broke out from injection\" 60 yrs ago  Tolerates cephalosporins       Labs Reviewed by provider: Hand XR         Verbal Order/Direction given by Provider: BIRDO    Provider giving Order:  Tonya Haase, APRN CNP    Verbal Order given to: Lidya Aguiar RN  "

## 2024-01-24 NOTE — PROGRESS NOTES
"[facility] GERIATRICS    Chief Complaint   Patient presents with    RECHECK     HPI:  Hermilo Velasquez is a 89 year old  (11/3/1932), who is being seen today for an episodic care visit at: [facility]) [25]. Today's concern is:  Left hand pain: left hand cellulitis  X-ray negative for acute fracture, started on doxycycline 100mg BID yesterday, today on exam there is less swelling and redness in hand, patient has pain with palpation and movement of left hand  C-diff/failure to thrive/malnutrition: nursing report formed stool, weight 150.8lbs on admission, patient does not want to have f/u with GI for C-diff, family have stated that all f/u appt have been cancelled   In therapy patient is refusing to walk, moderate assist for transfers, at time of exam patient is sitting up in w/c  Lung cancer/COPD:  SaO2 92% on O2 2l/nc, patient denies SOB,cough, congestion  HTN/atrial fib/CKD: BP as follows 103/65, 96/55, 92/64 with HR 90 range, denies CP, palpitations  Cognitive impairment: BIMS 13/15 and SBT 14/28       Allergies, and PMH/PSH reviewed in [facility] today.  REVIEW OF SYSTEMS:  10 point ROS of systems including Constitutional, Eyes, Respiratory, Cardiovascular, Gastroenterology, Genitourinary, Integumentary, Musculoskeletal, Psychiatric were all negative except for pertinent positives noted in my HPI.    Objective:   /65   Pulse 101   Temp 97.2  F (36.2  C)   Resp 18   Ht 1.778 m (5' 10\")   Wt 68 kg (150 lb)   SpO2 92%   BMI 21.52 kg/m    GENERAL APPEARANCE:  Alert, in no distress  ENT:  Mouth and posterior oropharynx normal, moist mucous membranes, [place]  EYES:  EOM, conjunctivae, lids, pupils and irises normal, PERRL  RESP:  respiratory effort and palpation of chest normal, lungs clear to auscultation , no respiratory distress, diminished breath sounds bases bilaterally  CV:  Palpation and auscultation of heart done , regular rate and rhythm, no murmur, rub, or gallop, peripheral edema " trace+ in LE bilaterally  ABDOMEN:  normal bowel sounds, soft, nontender, no hepatosplenomegaly or other masses  M/S:   patient sitting up in w/c  SKIN:  Inspection of skin and subcutaneous tissue baseline, left hand with slight swelling and redness across knuckles, improved since last assessment  NEURO:   speech wnl  PSYCH:  affect and mood normal    Recent labs in [facility] reviewed by me today.  and Most Recent 3 CBC's:  Recent Labs   Lab Test 01/19/24  0836 01/16/24  0658 01/15/24  1855   WBC 6.2 9.3 10.7   HGB 9.2* 8.8* 11.0*   MCV 81 82 83   * 144* 164     Most Recent 3 BMP's:  Recent Labs   Lab Test 01/20/24  1045 01/20/24  0034 01/19/24  0836 01/16/24  0658 01/15/24  1855   NA  --   --  144 144 140   POTASSIUM 3.6 3.1* 3.1* 3.7 4.0   CHLORIDE  --   --  106 109* 100   CO2  --   --  30* 26 30*   BUN  --   --  12.7 21.0 18.3   CR  --   --  0.79 1.14 1.31*   ANIONGAP  --   --  8 9 10   AMRITA  --   --  8.4 8.9 9.6   GLC  --   --  106* 107* 155*       Assessment/Plan:  (L03.114) Cellulitis of left hand  (M79.642) pain in left hand  Acute/ongoing  Plan: 2 view x-ray of left hand was negative, continue ice prn, started on doxycycline 100mg BID 1/24/24 for 10 days     (A04.72) Colitis due to Clostridium difficile  (primary encounter diagnosis)  (R62.7) Failure to thrive in adult  (R53.81) Physical deconditioning  (E44.0) Moderate protein-calorie malnutrition (H24)  Comment: acute/ongoing  Patient/family states all f/u appt with GI have been cancelled  Plan: PT and OT, dietician to follow, vancomycin 125mg QID for 10 days, continue to taper on 1/26/24 will decrease to 125mg TID for 7 days then BID for 7 days, then QD for 7 days then DC       (R13.10) Dysphagia, unspecified type  Comment: acute/ongoing, no change  Plan: SLP to follow, continue dysphagia diet     (C34.91) Adenocarcinoma of right lung (H)  Comment: acute/ongoing, no change  Plan: f/u with [facility] as [facility]     (R22.0) Nodule of tongue  Comment:  acute/ongoing, no change  Plan: f/u with ENT as OP     (I10) Essential hypertension with goal blood pressure less than 140/90  (I48.0) Paroxysmal atrial fib -- on Eliquis   (N18.30) Stage 3 chronic kidney disease, unspecified whether stage 3a or 3b CKD (H)  Comment: acute/ongoing, no change  Plan: BMP follow, continue to follow off Rx, avoid nephrotoxic agents     (J44.0) Chronic obstructive pulmonary disease with acute lower respiratory infection (H)  Comment: ongoing, no change  Plan: on chronic O2, continue spiriva 18mcg QD and albuterol MDI 2 puffs q 6 hours prn     (R41.89) Cognitive impairment  Comment: ongoing, no change  Plan: OT eval, continue to monitor     (D50.0) Iron deficiency anemia due to chronic blood loss  Comment: acute/ongoing, no  change  Plan: CBC follow    MED REC REQUIRED  Post Medication Reconciliation Status: medication reconcilation previously completed during another office visit      Orders:  Doxycycline 100mg BID for 10 days ordered 1/24/24      Electronically signed by: Tonya Lynn Haase, APRN CNP

## 2024-01-24 NOTE — TELEPHONE ENCOUNTER
MTM referral from: Transitions of Care (recent hospital discharge or ED visit)    MTM referral outreach attempt #2 on January 24, 2024 at 12:40 PM      Outcome: Patient not reachable after several attempts, will route to [facility] Pharmacist/Provider as an FYI.  [facility] scheduling number is .  Thank you for the referral.    Use [facility] part d map ([email or web address])  for the carrier/Plan on the flowsheet      Alleantiat Message Sent    Annie Nowak  [facility]

## 2024-01-25 NOTE — TELEPHONE ENCOUNTER
"[facility] Geriatrics Lab Note     Provider: Tonya Haase, APRN CNP  Facility: [facility] Facility Type:  TCU    Allergies   Allergen Reactions    Omeprazole Itching    Pantoprazole Itching    Prevacid [Lansoprazole] Itching    Lasix [Furosemide] Rash    Lidocaine Blisters and Rash     Allergy to lidocaine ointment  Allergy to lidocaine ointment      Penicillin G Rash    Penicillins Rash     \"broke out from injection\" 60 yrs ago  Tolerates cephalosporins       Labs Reviewed by provider: CBC and BMP   Not on KCL, on oral Vanco for c-diff, no stool for a few days.         Verbal Order/Direction given by Provider:   Start KDur 20meq QD re check BMP on MonDay    Provider giving Order:  Tonya Haase, APRN CNP    Verbal Order given to: Virginie Austin RN  "

## 2024-01-25 NOTE — LETTER
"    1/25/2024        RE: Hermilo Velasquez  [address] [facility] [address]        [facility] GERIATRICS    Chief Complaint   Patient presents with     RECHECK     HPI:  Hermilo Velasquez is a 89 year old  (11/3/1932), who is being seen today for an episodic care visit at: [facility]) [25]. Today's concern is:  Left hand pain: left hand cellulitis  X-ray negative for acute fracture, started on doxycycline 100mg BID yesterday, today on exam there is less swelling and redness in hand, patient has pain with palpation and movement of left hand  C-diff/failure to thrive/malnutrition: nursing report formed stool, weight 150.8lbs on admission, patient does not want to have f/u with GI for C-diff, family have stated that all f/u appt have been cancelled   In therapy patient is refusing to walk, moderate assist for transfers, at time of exam patient is sitting up in w/c  Lung cancer/COPD:  SaO2 92% on O2 2l/nc, patient denies SOB,cough, congestion  HTN/atrial fib/CKD: BP as follows 103/65, 96/55, 92/64 with HR 90 range, denies CP, palpitations  Cognitive impairment: BIMS 13/15 and SBT 14/28       Allergies, and PMH/PSH reviewed in [facility] today.  REVIEW OF SYSTEMS:  10 point ROS of systems including Constitutional, Eyes, Respiratory, Cardiovascular, Gastroenterology, Genitourinary, Integumentary, Musculoskeletal, Psychiatric were all negative except for pertinent positives noted in my HPI.    Objective:   /65   Pulse 101   Temp 97.2  F (36.2  C)   Resp 18   Ht 1.778 m (5' 10\")   Wt 68 kg (150 lb)   SpO2 92%   BMI 21.52 kg/m    GENERAL APPEARANCE:  Alert, in no distress  ENT:  Mouth and posterior oropharynx normal, moist mucous membranes, [place]  EYES:  EOM, conjunctivae, lids, pupils and irises normal, PERRL  RESP:  respiratory effort and palpation of chest normal, lungs clear to auscultation , no respiratory distress, diminished breath sounds bases bilaterally  CV:  Palpation and " auscultation of heart done , regular rate and rhythm, no murmur, rub, or gallop, peripheral edema trace+ in LE bilaterally  ABDOMEN:  normal bowel sounds, soft, nontender, no hepatosplenomegaly or other masses  M/S:   patient sitting up in w/c  SKIN:  Inspection of skin and subcutaneous tissue baseline, left hand with slight swelling and redness across knuckles, improved since last assessment  NEURO:   speech wnl  PSYCH:  affect and mood normal    Recent labs in [facility] reviewed by me today.  and Most Recent 3 CBC's:  Recent Labs   Lab Test 01/19/24  0836 01/16/24  0658 01/15/24  1855   WBC 6.2 9.3 10.7   HGB 9.2* 8.8* 11.0*   MCV 81 82 83   * 144* 164     Most Recent 3 BMP's:  Recent Labs   Lab Test 01/20/24  1045 01/20/24  0034 01/19/24  0836 01/16/24  0658 01/15/24  1855   NA  --   --  144 144 140   POTASSIUM 3.6 3.1* 3.1* 3.7 4.0   CHLORIDE  --   --  106 109* 100   CO2  --   --  30* 26 30*   BUN  --   --  12.7 21.0 18.3   CR  --   --  0.79 1.14 1.31*   ANIONGAP  --   --  8 9 10   AMRITA  --   --  8.4 8.9 9.6   GLC  --   --  106* 107* 155*       Assessment/Plan:  (L03.114) Cellulitis of left hand  (M79.642) pain in left hand  Acute/ongoing  Plan: 2 view x-ray of left hand was negative, continue ice prn, started on doxycycline 100mg BID 1/24/24 for 10 days     (A04.72) Colitis due to Clostridium difficile  (primary encounter diagnosis)  (R62.7) Failure to thrive in adult  (R53.81) Physical deconditioning  (E44.0) Moderate protein-calorie malnutrition (H24)  Comment: acute/ongoing  Patient/family states all f/u appt with GI have been cancelled  Plan: PT and OT, dietician to follow, vancomycin 125mg QID for 10 days, continue to taper on 1/26/24 will decrease to 125mg TID for 7 days then BID for 7 days, then QD for 7 days then DC       (R13.10) Dysphagia, unspecified type  Comment: acute/ongoing, no change  Plan: SLP to follow, continue dysphagia diet     (C34.91) Adenocarcinoma of right lung (H)  Comment:  acute/ongoing, no change  Plan: [facility] [facility]     (R22.0) Nodule of tongue  Comment: acute/ongoing, no change  Plan: f/u with ENT as OP     (I10) Essential hypertension with goal blood pressure less than 140/90  (I48.0) Paroxysmal atrial fib -- on Eliquis   (N18.30) Stage 3 chronic kidney disease, unspecified whether stage 3a or 3b CKD (H)  Comment: acute/ongoing, no change  Plan: BMP follow, continue to follow off Rx, avoid nephrotoxic agents     (J44.0) Chronic obstructive pulmonary disease with acute lower respiratory infection (H)  Comment: ongoing, no change  Plan: on chronic O2, continue spiriva 18mcg QD and albuterol MDI 2 puffs q 6 hours prn     (R41.89) Cognitive impairment  Comment: ongoing, no change  Plan: OT eval, continue to monitor     (D50.0) Iron deficiency anemia due to chronic blood loss  Comment: acute/ongoing, no  change  Plan: CBC follow    MED REC REQUIRED  Post Medication Reconciliation Status: medication reconcilation previously completed during another office visit      Orders:  Doxycycline 100mg BID for 10 days ordered 1/24/24      Electronically signed by: Tonya Lynn Haase, APRN CNP         Sincerely,        Tonya Lynn Haase, APRN CNP

## 2024-01-29 NOTE — LETTER
"    1/29/2024        RE: Hermilo Velasquez  [address] [facility] [address]        [facility] GERIATRICS    Chief Complaint   Patient presents with     RECHECK     HPI:  Hermilo Velasquez is a 89 year old  (11/3/1932), who is being seen today for an episodic care visit at: [facility]) [25]. Today's concern is:   Left hand pain: left hand cellulitis  X-ray negative for acute fracture, started on doxycycline 100mg BID with improvement of swelling and redness to left hand, patient denies pain or discomfort today on exam  C-diff/failure to thrive/malnutrition: nursing report formed stool, weight 150.8lbs on admission  In therapy patient is walking up to 90 feet using a RW with CGA  Lung cancer/COPD:  SaO2 94% on O2 2l/nc, patient denies SOB,cough, congestion  HTN/atrial fib/CKD: BP as follows 108/61, 122/77, 92/53 with HR 80-90range, denies CP, palpitations  Cognitive impairment: BIMS 13/15 and SBT 14/28  Heart burn: patient c/o heart burn over past weekend, states he takes tums in past for heart burn       Allergies, and PMH/PSH reviewed in [facility] today.  REVIEW OF SYSTEMS:  10 point ROS of systems including Constitutional, Eyes, Respiratory, Cardiovascular, Gastroenterology, Genitourinary, Integumentary, Musculoskeletal, Psychiatric were all negative except for pertinent positives noted in my HPI.    Objective:   /61   Pulse 82   Temp 97.3  F (36.3  C)   Resp 18   Ht 1.778 m (5' 10\")   Wt 68 kg (150 lb)   SpO2 94%   BMI 21.52 kg/m    GENERAL APPEARANCE:  Alert, in no distress  ENT:  Mouth and posterior oropharynx normal, moist mucous membranes, [place]  EYES:  EOM, conjunctivae, lids, pupils and irises normal  RESP:  respiratory effort and palpation of chest normal, lungs clear to auscultation , no respiratory distress  CV:  Palpation and auscultation of heart done , regular rate and rhythm, no murmur, rub, or gallop, peripheral edema trace+ in LE bilaterally  ABDOMEN:  normal bowel " sounds, soft, nontender, no hepatosplenomegaly or other masses  M/S:   patient sitting up in w/c  SKIN:  Inspection of skin and subcutaneous tissue baseline  NEURO:   speech wnl  PSYCH:  affect and mood normal    Recent labs in [facility] reviewed by me today.  and Most Recent 3 CBC's:  Recent Labs   Lab Test 01/19/24  0836 01/16/24  0658 01/15/24  1855   WBC 6.2 9.3 10.7   HGB 9.2* 8.8* 11.0*   MCV 81 82 83   * 144* 164     Most Recent 3 BMP's:  Recent Labs   Lab Test 01/20/24  1045 01/20/24  0034 01/19/24  0836 01/16/24  0658 01/15/24  1855   NA  --   --  144 144 140   POTASSIUM 3.6 3.1* 3.1* 3.7 4.0   CHLORIDE  --   --  106 109* 100   CO2  --   --  30* 26 30*   BUN  --   --  12.7 21.0 18.3   CR  --   --  0.79 1.14 1.31*   ANIONGAP  --   --  8 9 10   AMRITA  --   --  8.4 8.9 9.6   GLC  --   --  106* 107* 155*       Assessment/Plan:  (L03.114) Cellulitis of left hand  (M79.642) pain in left hand  Acute/ongoing, improved  Plan: 2 view x-ray of left hand was negative, continue ice prn, started on doxycycline 100mg BID 1/24/24 for 10 days     (A04.72) Colitis due to Clostridium difficile  (primary encounter diagnosis)  (R62.7) Failure to thrive in adult  (R53.81) Physical deconditioning  (E44.0) Moderate protein-calorie malnutrition (H24)  Comment: acute/ongoing, stable  Patient/family states all f/u appt with GI have been cancelled  Plan: PT and OT, dietician to follow, vancomycin 125mg QID for 10 days, continue to taper on 1/26/24 will decrease to 125mg TID for 7 days then BID for 7 days, then QD for 7 days then DC        (R13.10) Dysphagia, unspecified type  Comment: acute/ongoing, no change  Plan: SLP to follow, continue dysphagia diet     (R12) Heart burn  Acute/ongong  TUMS 500mg q 4 hours prn    (C34.91) Adenocarcinoma of right lung (H)  Comment: acute/ongoing, no change  Plan: [facility]u with [facility]     (R22.0) Nodule of tongue  Comment: acute/ongoing, no change  Plan: f/u with ENT as OP     (I10) Essential  hypertension with goal blood pressure less than 140/90  (I48.0) Paroxysmal atrial fib -- on Eliquis   (N18.30) Stage 3 chronic kidney disease, unspecified whether stage 3a or 3b CKD (H)  Comment: acute/ongoing, no change  Plan: BMP follow, continue to follow off Rx, avoid nephrotoxic agents     (J44.0) Chronic obstructive pulmonary disease with acute lower respiratory infection (H)  Comment: ongoing, no change  Plan: on chronic O2, continue spiriva 18mcg QD and albuterol MDI 2 puffs q 6 hours prn     (R41.89) Cognitive impairment  Comment: ongoing, no change  Plan: OT eval, continue to monitor     (D50.0) Iron deficiency anemia due to chronic blood loss  Hgb on 1/25/24 was 9.0  Comment: acute/ongoing, no change  Plan: CBC follow       MED REC REQUIRED  Post Medication Reconciliation Status: medication reconcilation previously completed during another office visit      Orders:  TUMS 500mg q 4 hours prn      Electronically signed by: Tonya Lynn Haase, APRN CNP         Sincerely,        Tonya Lynn Haase, APRN CNP

## 2024-01-29 NOTE — PROGRESS NOTES
"[facility] GERIATRICS    Chief Complaint   Patient presents with    RECHECK     HPI:  Hermilo Velasquez is a 89 year old  (11/3/1932), who is being seen today for an episodic care visit at: [facility]) [25]. Today's concern is:   Left hand pain: left hand cellulitis  X-ray negative for acute fracture, started on doxycycline 100mg BID with improvement of swelling and redness to left hand, patient denies pain or discomfort today on exam  C-diff/failure to thrive/malnutrition: nursing report formed stool, weight 150.8lbs on admission  In therapy patient is walking up to 90 feet using a RW with CGA  Lung cancer/COPD:  SaO2 94% on O2 2l/nc, patient denies SOB,cough, congestion  HTN/atrial fib/CKD: BP as follows 108/61, 122/77, 92/53 with HR 80-90range, denies CP, palpitations  Cognitive impairment: BIMS 13/15 and SBT 14/28  Heart burn: patient c/o heart burn over past weekend, states he takes tums in past for heart burn       Allergies, and PMH/PSH reviewed in [facility] today.  REVIEW OF SYSTEMS:  10 point ROS of systems including Constitutional, Eyes, Respiratory, Cardiovascular, Gastroenterology, Genitourinary, Integumentary, Musculoskeletal, Psychiatric were all negative except for pertinent positives noted in my HPI.    Objective:   /61   Pulse 82   Temp 97.3  F (36.3  C)   Resp 18   Ht 1.778 m (5' 10\")   Wt 68 kg (150 lb)   SpO2 94%   BMI 21.52 kg/m    GENERAL APPEARANCE:  Alert, in no distress  ENT:  Mouth and posterior oropharynx normal, moist mucous membranes, [place]  EYES:  EOM, conjunctivae, lids, pupils and irises normal  RESP:  respiratory effort and palpation of chest normal, lungs clear to auscultation , no respiratory distress  CV:  Palpation and auscultation of heart done , regular rate and rhythm, no murmur, rub, or gallop, peripheral edema trace+ in LE bilaterally  ABDOMEN:  normal bowel sounds, soft, nontender, no hepatosplenomegaly or other masses  M/S:   patient sitting up in " w/c  SKIN:  Inspection of skin and subcutaneous tissue baseline  NEURO:   speech wnl  PSYCH:  affect and mood normal    Recent labs in [facility] reviewed by me today.  and Most Recent 3 CBC's:  Recent Labs   Lab Test 01/19/24  0836 01/16/24  0658 01/15/24  1855   WBC 6.2 9.3 10.7   HGB 9.2* 8.8* 11.0*   MCV 81 82 83   * 144* 164     Most Recent 3 BMP's:  Recent Labs   Lab Test 01/20/24  1045 01/20/24  0034 01/19/24  0836 01/16/24  0658 01/15/24  1855   NA  --   --  144 144 140   POTASSIUM 3.6 3.1* 3.1* 3.7 4.0   CHLORIDE  --   --  106 109* 100   CO2  --   --  30* 26 30*   BUN  --   --  12.7 21.0 18.3   CR  --   --  0.79 1.14 1.31*   ANIONGAP  --   --  8 9 10   AMRITA  --   --  8.4 8.9 9.6   GLC  --   --  106* 107* 155*       Assessment/Plan:  (L03.114) Cellulitis of left hand  (M79.642) pain in left hand  Acute/ongoing, improved  Plan: 2 view x-ray of left hand was negative, continue ice prn, started on doxycycline 100mg BID 1/24/24 for 10 days     (A04.72) Colitis due to Clostridium difficile  (primary encounter diagnosis)  (R62.7) Failure to thrive in adult  (R53.81) Physical deconditioning  (E44.0) Moderate protein-calorie malnutrition (H24)  Comment: acute/ongoing, stable  Patient/family states all f/u appt with GI have been cancelled  Plan: PT and OT, dietician to follow, vancomycin 125mg QID for 10 days, continue to taper on 1/26/24 will decrease to 125mg TID for 7 days then BID for 7 days, then QD for 7 days then DC        (R13.10) Dysphagia, unspecified type  Comment: acute/ongoing, no change  Plan: SLP to follow, continue dysphagia diet     (R12) Heart burn  Acute/ongong  TUMS 500mg q 4 hours prn    (C34.91) Adenocarcinoma of right lung (H)  Comment: acute/ongoing, no change  Plan: [facility] with [facility] [place] [facility]     (R22.0) Nodule of tongue  Comment: acute/ongoing, no change  Plan: f/u with ENT as OP     (I10) Essential hypertension with goal blood pressure less than 140/90  (I48.0) Paroxysmal atrial fib -- on  Eliquis   (N18.30) Stage 3 chronic kidney disease, unspecified whether stage 3a or 3b CKD (H)  Comment: acute/ongoing, no change  Plan: BMP follow, continue to follow off Rx, avoid nephrotoxic agents     (J44.0) Chronic obstructive pulmonary disease with acute lower respiratory infection (H)  Comment: ongoing, no change  Plan: on chronic O2, continue spiriva 18mcg QD and albuterol MDI 2 puffs q 6 hours prn     (R41.89) Cognitive impairment  Comment: ongoing, no change  Plan: OT eval, continue to monitor     (D50.0) Iron deficiency anemia due to chronic blood loss  Hgb on 1/25/24 was 9.0  Comment: acute/ongoing, no change  Plan: CBC follow       MED REC REQUIRED  Post Medication Reconciliation Status: medication reconcilation previously completed during another office visit      Orders:  TUMS 500mg q 4 hours prn      Electronically signed by: Tonya Lynn Haase, APRN CNP

## 2024-01-30 NOTE — PROGRESS NOTES
"[facility] GERIATRICS    Chief Complaint   Patient presents with    RECHECK     HPI:  Hermilo Velasquez is a 89 year old  (11/3/1932), who is being seen today for an episodic care visit at: [facility]) [25]. Today's concern is:   Left hand pain: left hand cellulitis  X-ray negative for acute fracture, patient denies pain or discomfort today on exam, no redness noted to left hand  C-diff/failure to thrive/malnutrition: nursing report formed stool, weight 150.8lbs on admission and 148lbs currently  Nursing state stools have been formed  In therapy patient is walking up to 155 feet using a RW with CGA  Agitation/behaviors: nursing report that last night patient was abusive verbally and physically with staff, today he answers ROS questions minimally and nursing report he is not following directions    Lung cancer/COPD:  SaO2 90-91% on O2 2l/nc, patient denies SOB,cough, congestion  HTN/atrial fib/CKD: BP as follows 110/67, 98/55, 108/75 with HR 80 range, denies CP, palpitations  Cognitive impairment: BIMS 13/15 and SBT 14/28  Heart burn: no c/o heart burn at time of exam       Allergies, and PMH/PSH reviewed in EPIC today.  REVIEW OF SYSTEMS:  10 point ROS of systems including Constitutional, Eyes, Respiratory, Cardiovascular, Gastroenterology, Genitourinary, Integumentary, Musculoskeletal, Psychiatric were all negative except for pertinent positives noted in my HPI.    Objective:   BP 98/55   Pulse 89   Temp 97.4  F (36.3  C)   Resp 18   Ht 1.778 m (5' 10\")   Wt 67.1 kg (148 lb)   SpO2 91%   BMI 21.24 kg/m    GENERAL APPEARANCE:  Alert, in no distress, appears agitated  ENT:  Mouth and posterior oropharynx normal, moist mucous membranes, [place]  EYES:  EOM, conjunctivae, lids, pupils and irises normal, PERRL  RESP:  respiratory effort and palpation of chest normal, lungs clear to auscultation , no respiratory distress  CV:  Palpation and auscultation of heart done , regular rate and rhythm, no " murmur, rub, or gallop, peripheral edema trace+ in LE bilaterally  ABDOMEN:  normal bowel sounds, soft, nontender, no hepatosplenomegaly or other masses  M/S:   patient sitting up in w/c  SKIN:  Inspection of skin and subcutaneous tissue baseline, left hand swelling and redness have resolved  NEURO:   answers yes and no questions minimally  PSYCH:  agitated    Recent labs in [facility] reviewed by me today.  and Most Recent 3 CBC's:  Recent Labs   Lab Test 01/19/24  0836 01/16/24  0658 01/15/24  1855   WBC 6.2 9.3 10.7   HGB 9.2* 8.8* 11.0*   MCV 81 82 83   * 144* 164     Most Recent 3 BMP's:  Recent Labs   Lab Test 01/20/24  1045 01/20/24  0034 01/19/24  0836 01/16/24  0658 01/15/24  1855   NA  --   --  144 144 140   POTASSIUM 3.6 3.1* 3.1* 3.7 4.0   CHLORIDE  --   --  106 109* 100   CO2  --   --  30* 26 30*   BUN  --   --  12.7 21.0 18.3   CR  --   --  0.79 1.14 1.31*   ANIONGAP  --   --  8 9 10   AMRITA  --   --  8.4 8.9 9.6   GLC  --   --  106* 107* 155*       Assessment/Plan:  (L03.114) Cellulitis of left hand  (M79.642) pain in left hand  resolved  Plan: 2 view x-ray of left hand was negative, continue ice prn, started on doxycycline 100mg BID 1/24/24 for 10 days     (A04.72) Colitis due to Clostridium difficile  (primary encounter diagnosis)  (R62.7) Failure to thrive in adult  (R53.81) Physical deconditioning  (E44.0) Moderate protein-calorie malnutrition (H24)  Comment: acute/ongoing, no change  Patient/family states all f/u appt with GI have been cancelled  Plan: PT and OT, dietician to follow, vancomycin 125mg QID for 10 days, continue to taper on 1/26/24 will decrease to 125mg TID for 7 days then BID for 7 days, then QD for 7 days then DC        (R13.10) Dysphagia, unspecified type  Comment: acute/ongoing, no change  Plan: SLP to follow, continue dysphagia diet     (R12) Heart burn  improved  TUMS 500mg q 4 hours prn     (C34.91) Adenocarcinoma of right lung (H)  Comment: acute/ongoing, no change  Plan:  f/u with [facility] as [facility]     (R22.0) Nodule of tongue  Comment: acute/ongoing, no change  Plan: f/u with ENT as OP     (I10) Essential hypertension with goal blood pressure less than 140/90  (I48.0) Paroxysmal atrial fib -- on Eliquis   (N18.30) Stage 3 chronic kidney disease, unspecified whether stage 3a or 3b CKD (H)  Comment: acute/ongoing, no change  Plan: BMP follow, continue to follow off Rx, avoid nephrotoxic agents     (J44.0) Chronic obstructive pulmonary disease with acute lower respiratory infection (H)  Comment: ongoing, no change  Plan: on chronic O2, continue spiriva 18mcg QD and albuterol MDI 2 puffs q 6 hours prn     (R41.89) Cognitive impairment  Agitation and behaviors, aggressive  Started 1/30/24 in evening  Comment: acute/ongoing  Plan: OT ongoing, ss to assist with discharge planning  Start hydroxyzine 25mg TID     (D50.0) Iron deficiency anemia due to chronic blood loss  Hgb on 1/25/24 was 9.0  Comment: acute/ongoing, no change  Plan: CBC follow       MED REC REQUIRED  Post Medication Reconciliation Status: medication reconcilation previously completed during another office visit      Orders:  Hydroxyzine 25mg TID scheduled      Electronically signed by: Tonya Lynn Haase, APRN CNP

## 2024-01-31 NOTE — LETTER
"    1/31/2024        RE: Hermilo Velasquez  [address] [facility] [address]        [facility] GERIATRICS    Chief Complaint   Patient presents with     RECHECK     HPI:  Hermilo Velasquez is a 89 year old  (11/3/1932), who is being seen today for an episodic care visit at: [facility]) [25]. Today's concern is:   Left hand pain: left hand cellulitis  X-ray negative for acute fracture, patient denies pain or discomfort today on exam, no redness noted to left hand  C-diff/failure to thrive/malnutrition: nursing report formed stool, weight 150.8lbs on admission and 148lbs currently  Nursing state stools have been formed  In therapy patient is walking up to 155 feet using a RW with CGA  Agitation/behaviors: nursing report that last night patient was abusive verbally and physically with staff, today he answers ROS questions minimally and nursing report he is not following directions    Lung cancer/COPD:  SaO2 90-91% on O2 2l/nc, patient denies SOB,cough, congestion  HTN/atrial fib/CKD: BP as follows 110/67, 98/55, 108/75 with HR 80 range, denies CP, palpitations  Cognitive impairment: BIMS 13/15 and SBT 14/28  Heart burn: no c/o heart burn at time of exam       Allergies, and PMH/PSH reviewed in [facility] today.  REVIEW OF SYSTEMS:  10 point ROS of systems including Constitutional, Eyes, Respiratory, Cardiovascular, Gastroenterology, Genitourinary, Integumentary, Musculoskeletal, Psychiatric were all negative except for pertinent positives noted in my HPI.    Objective:   BP 98/55   Pulse 89   Temp 97.4  F (36.3  C)   Resp 18   Ht 1.778 m (5' 10\")   Wt 67.1 kg (148 lb)   SpO2 91%   BMI 21.24 kg/m    GENERAL APPEARANCE:  Alert, in no distress, appears agitated  ENT:  Mouth and posterior oropharynx normal, moist mucous membranes, [place]  EYES:  EOM, conjunctivae, lids, pupils and irises normal, PERRL  RESP:  respiratory effort and palpation of chest normal, lungs clear to auscultation , no " respiratory distress  CV:  Palpation and auscultation of heart done , regular rate and rhythm, no murmur, rub, or gallop, peripheral edema trace+ in LE bilaterally  ABDOMEN:  normal bowel sounds, soft, nontender, no hepatosplenomegaly or other masses  M/S:   patient sitting up in w/c  SKIN:  Inspection of skin and subcutaneous tissue baseline, left hand swelling and redness have resolved  NEURO:   answers yes and no questions minimally  PSYCH:  agitated    Recent labs in [facility] reviewed by me today.  and Most Recent 3 CBC's:  Recent Labs   Lab Test 01/19/24  0836 01/16/24  0658 01/15/24  1855   WBC 6.2 9.3 10.7   HGB 9.2* 8.8* 11.0*   MCV 81 82 83   * 144* 164     Most Recent 3 BMP's:  Recent Labs   Lab Test 01/20/24  1045 01/20/24  0034 01/19/24  0836 01/16/24  0658 01/15/24  1855   NA  --   --  144 144 140   POTASSIUM 3.6 3.1* 3.1* 3.7 4.0   CHLORIDE  --   --  106 109* 100   CO2  --   --  30* 26 30*   BUN  --   --  12.7 21.0 18.3   CR  --   --  0.79 1.14 1.31*   ANIONGAP  --   --  8 9 10   AMRITA  --   --  8.4 8.9 9.6   GLC  --   --  106* 107* 155*       Assessment/Plan:  (L03.114) Cellulitis of left hand  (M79.642) pain in left hand  resolved  Plan: 2 view x-ray of left hand was negative, continue ice prn, started on doxycycline 100mg BID 1/24/24 for 10 days     (A04.72) Colitis due to Clostridium difficile  (primary encounter diagnosis)  (R62.7) Failure to thrive in adult  (R53.81) Physical deconditioning  (E44.0) Moderate protein-calorie malnutrition (H24)  Comment: acute/ongoing, no change  Patient/family states all f/u appt with GI have been cancelled  Plan: PT and OT, dietician to follow, vancomycin 125mg QID for 10 days, continue to taper on 1/26/24 will decrease to 125mg TID for 7 days then BID for 7 days, then QD for 7 days then DC        (R13.10) Dysphagia, unspecified type  Comment: acute/ongoing, no change  Plan: SLP to follow, continue dysphagia diet     (R12) Heart burn  improved  TUMS 500mg q 4  hours prn     (C34.91) Adenocarcinoma of right lung (H)  Comment: acute/ongoing, no change  Plan: [facility]u [facility] as [facility]     (R22.0) Nodule of tongue  Comment: acute/ongoing, no change  Plan: f/u with ENT as OP     (I10) Essential hypertension with goal blood pressure less than 140/90  (I48.0) Paroxysmal atrial fib -- on Eliquis   (N18.30) Stage 3 chronic kidney disease, unspecified whether stage 3a or 3b CKD (H)  Comment: acute/ongoing, no change  Plan: BMP follow, continue to follow off Rx, avoid nephrotoxic agents     (J44.0) Chronic obstructive pulmonary disease with acute lower respiratory infection (H)  Comment: ongoing, no change  Plan: on chronic O2, continue spiriva 18mcg QD and albuterol MDI 2 puffs q 6 hours prn     (R41.89) Cognitive impairment  Agitation and behaviors, aggressive  Started 1/30/24 in evening  Comment: acute/ongoing  Plan: OT ongoing, ss to assist with discharge planning  Start hydroxyzine 25mg TID     (D50.0) Iron deficiency anemia due to chronic blood loss  Hgb on 1/25/24 was 9.0  Comment: acute/ongoing, no change  Plan: CBC follow       MED REC REQUIRED  Post Medication Reconciliation Status: medication reconcilation previously completed during another office visit      Orders:  Hydroxyzine 25mg TID scheduled      Electronically signed by: Tonya Lynn Haase, APRN CNP         Sincerely,        Tonya Lynn Haase, APRN CNP

## 2024-02-01 NOTE — PROGRESS NOTES
[facility] GERIATRICS    PRIMARY CARE PROVIDER AND CLINIC:  Karson Bishop MD, [address] [facility] [address] / [facility] [address]  Chief Complaint   Patient presents with    Hospital F/U      [facility] Medical Record Number:  [id number]  Place of Service where encounter took place:  [facility] ([facility])     Hermilo Velasquez  is a 89 year old  (11/3/1932), admitted to the above facility from  [facility]. Hospital stay 1/15/24 through 1/21/24..   HPI:      Hospital course was reviewed by me, is as per the hospital discharge summary and nurse practitioner note    Patient has a past medical history of recurrent C. 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    He was hospitalized for evaluation of failure to thrive in the setting of diarrhea.  Patient has a history of recurrent C. difficile colitis, has been referred to the [facility] GI clinic for possible fecal transplant but has not been assessed yet.  He was started on oral vancomycin with improvement in his diarrhea.  Patient was noted to have altered mental status which improved somewhat during hospitalization.  Head CT normal.  Metabolic evaluation unremarkable.  He does have dysphagia which was evaluated by H therapy.  He does have a history of right lung cancer resection 2019.  PET scan and biopsy of the lung nodule has been ordered but canceled by the patient earlier this month.  He is not felt to be candidate for chemotherapy in any event.  There is a history of a pulmonary embolism in 2023 with incomplete treatment secondary to GI bleed.  Remote history of of IVC filter placement over 10 years ago, status unclear.  With regards to the history of lung cancer, patient has been noted  "to have multiple new lung nodules in 2023, as well as a lesion right tongue base of unclear significance.  As above he has not had a follow-up PET scan or biopsy of his lung nodule.  Recent course also remarkable for development of deep tissue pressure injury to the sacrum and bilateral buttocks.    He has done poorly at home for the last several months, has been hospitalized on 5 occasions since August 2023.  Per chart, patient's wife has requested restorative care after conversation with plan of care    Patient offers a limited history, repeatedly states \"I am doing okay \"he denies nausea, vomiting, abdominal pain, diarrhea, cough, chest pain, shortness of breath.  BIMS 13/15        CODE STATUS/ADVANCE DIRECTIVES DISCUSSION:  Prior  CPR/Full code   ALLERGIES:   Allergies   Allergen Reactions    Omeprazole Itching    Pantoprazole Itching    Prevacid [Lansoprazole] Itching    Lasix [Furosemide] Rash    Lidocaine Blisters and Rash     Allergy to lidocaine ointment  Allergy to lidocaine ointment      Penicillin G Rash    Penicillins Rash     \"broke out from injection\" 60 yrs ago  Tolerates cephalosporins      PAST MEDICAL HISTORY:   Past Medical History:   Diagnosis Date    Adenocarcinoma, lung, 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 Vasques     Aortic stenosis     Severe AS, 9/2015 AVR - [facility] TRIFECTA Bovine bioprosthesis 25MM TF-25A    Atrial fibrillation (H)     9/2015 Paroxysmal post op Afib - discharged on Warfarin and a beta blocker    COPD (chronic obstructive pulmonary disease) (H)     Deep vein thrombosis (H)     GERD (gastroesophageal reflux disease)     Heart murmur     Monoclonal gammopathy     plasmacyte prominent causing monoclonal gammopathy    Need for SBE (subacute bacterial endocarditis) prophylaxis     Neuropathy     Other and unspecified hyperlipidemia     Other malignant lymphomas     non hodgkin's lymphoma    RBBB (right bundle branch block)     Severe sepsis with acute organ " dysfunction (H) 11/16/2015    Unspecified hereditary and idiopathic peripheral neuropathy       PAST SURGICAL HISTORY:   has a past surgical history that includes appendectomy; tonsillectomy; knee surgery; rotator cuff repair rt/lt; turp; hernia repair (2006); Spine surgery; Repair ligament ankle (2/23/2012); Herniorrhaphy ventral (4/17/2013); Replace valve aortic (N/A, 9/3/2015); Esophagoscopy, gastroscopy, duodenoscopy (EGD), combined (N/A, 11/28/2015); TOTAL KNEE ARTHROPLASTY; back surgery; Esophagoscopy, gastroscopy, duodenoscopy (EGD), combined (N/A, 7/26/2018); Thoracotomy, wedge resection lung, combined (Right, 3/26/2019); Lobectomy lung (Right, 3/26/2019); Esophagoscopy, gastroscopy, duodenoscopy (EGD), combined (N/A, 8/17/2020); Esophagoscopy, gastroscopy, duodenoscopy (EGD), combined (N/A, 10/24/2020); Esophagoscopy, gastroscopy, duodenoscopy (EGD), combined (N/A, 4/11/2022); Arthroplasty knee (Right, 7/25/2022); and Esophagoscopy, gastroscopy, duodenoscopy (EGD), combined (N/A, 8/26/2022).  FAMILY HISTORY: family history includes C.A.D. in his father; Emphysema in his father.  SOCIAL HISTORY:   reports that he quit smoking about 26 years ago. His smoking use included cigarettes. He has a 110 pack-year smoking history. He has never used smokeless tobacco. He reports that he does not currently use alcohol. He reports that he does not use drugs.  Patient's living condition: lives with spouse        Current Outpatient Medications   Medication Sig    acetaminophen (TYLENOL) 500 MG tablet Take 1,000 mg by mouth 3 times daily as needed    albuterol (PROAIR HFA/PROVENTIL HFA/VENTOLIN HFA) 108 (90 Base) MCG/ACT inhaler Inhale 2 puffs into the lungs every 6 hours as needed    atorvastatin (LIPITOR) 10 MG tablet Take 1 tablet (10 mg) by mouth daily    DULoxetine (CYMBALTA) 60 MG capsule Take 60 mg by mouth daily    famotidine (PEPCID) 40 MG tablet Take 1 tablet (40 mg) by mouth 2 times daily    ferrous sulfate (FE  "TABS) 325 (65 Fe) MG EC tablet Take 1 tablet (325 mg) by mouth 2 times daily    fludrocortisone (FLORINEF) 0.1 MG tablet Take 1 tablet (0.1 mg) by mouth daily    gabapentin (NEURONTIN) 300 MG capsule Take 2 capsules (600 mg) by mouth At Bedtime For neuropathy pain    hydrOXYzine HCl (ATARAX) 25 MG tablet Take 2 tablets (50 mg) by mouth every 6 hours as needed (Patient taking differently: Take 25 mg by mouth every 6 hours as needed)    ondansetron (ZOFRAN ODT) 4 MG ODT tab Take 1 tablet (4 mg) by mouth every 6 hours as needed for nausea    potassium chloride ER (KLOR-CON M) 20 MEQ CR tablet Take 20 mEq by mouth daily    tamsulosin (FLOMAX) 0.4 MG capsule Take 1 capsule (0.4 mg) by mouth daily    tiotropium (SPIRIVA) 18 MCG inhaled capsule Inhale 18 mcg into the lungs daily    vancomycin (FIRVANQ) 50 MG/ML oral solution Take 2.5 mLs (125 mg) by mouth 4 times daily Takes 4 times daily x 10 days original start date 01/15. Continue oral taper once complete     No current facility-administered medications for this visit.       ROS:  Limited secondary to cognitive impairment but today pt reports no acute concerns    Vitals:  BP (!) 158/91   Pulse 84   Temp 97.6  F (36.4  C)   Resp 20   Ht 1.778 m (5' 10\")   Wt 67.1 kg (148 lb)   SpO2 90%   BMI 21.24 kg/m    Exam:  Sleepy, frail-appearing male lying in bed, wearing oxygen.  He alerts to name, is oriented to self and surroundings, repeatedly answers questions with \"I am okay.  HEENT: Poor dentition  Hard of hearing  Lungs respiratory rate 14, unlabored.  Decreased breath sounds bilaterally  CV generally regular, occasional systolic beat.  Soft systolic murmur  Abdomen soft, nontender, nondistended  Extremities no signs of acute arthritis.  No redness pain or swelling left hand.  No edema.  Skin: Sacral area was not examined by me.  Neuro: Orientation status difficult to assess as patient is sleepy face symmetric.  No gross focal weakness.  No " tremors.      Lab/Diagnostic data:  Most Recent 3 CBC's:  Recent Labs   Lab Test 01/19/24  0836 01/16/24  0658 01/15/24  1855   WBC 6.2 9.3 10.7   HGB 9.2* 8.8* 11.0*   MCV 81 82 83   * 144* 164     Most Recent 3 BMP's:  Recent Labs   Lab Test 01/20/24  1045 01/20/24  0034 01/19/24  0836 01/16/24  0658 01/15/24  1855   NA  --   --  144 144 140   POTASSIUM 3.6 3.1* 3.1* 3.7 4.0   CHLORIDE  --   --  106 109* 100   CO2  --   --  30* 26 30*   BUN  --   --  12.7 21.0 18.3   CR  --   --  0.79 1.14 1.31*   ANIONGAP  --   --  8 9 10   AMRITA  --   --  8.4 8.9 9.6   GLC  --   --  106* 107* 155*     Most Recent 2 LFT's:  Recent Labs   Lab Test 01/15/24  1855 01/08/24  1755   AST 60* 7   ALT 15 <5   ALKPHOS 102 90   BILITOTAL 0.9 0.3     Most Recent TSH and T4:  Recent Labs   Lab Test 01/15/24  1855 10/26/23  1733 11/06/22  1007   TSH 3.89   < > 4.36*   T4  --   --  0.97    < > = values in this interval not displayed.       ASSESSMENT/PLAN:    Failure to thrive in the setting of multiple chronic medical concerns including O2 dependent COPD, recurrent C. 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  Diarrhea improved with resumption of vancomycin  Oral intake reported to be diminished.  Patient is ambulating in therapy using a walker.  He does have intermittent confusion, which per chart review may be at or close to baseline  Plan: Continue therapies.  Manage multiple medical issues as noted below monitor nutritional status.  Ongoing review of goals of care in view of possible recurrent lung cancer, possible new tongue cancer in the setting of multiple chronic medical concerns as well as malnutrition    Recurrent C. difficile colitis  Plan: Continue vancomycin with slow taper as recommended by GI  GI follow-up if patient and family desire    Anemia  MCV low 80s  History of GI bleed  Recent iron studies suggestive of  mixed iron deficiency and chronic disease  Plan: Continue Pepcid.  Continue iron.  Monitor hemoglobin    Left hand pain and swelling noted 2 days ago.  Appears resolved.  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

## 2024-02-02 NOTE — LETTER
2/2/2024        RE: Hermilo Velasquez  [address]  [facility] [address]        [facility] GERIATRICS    PRIMARY CARE PROVIDER AND CLINIC:  Karson Bishop MD, [address] [place] [address] / [place] [address]  Chief Complaint   Patient presents with     Hospital F/U      [place] Medical Record Number:  [id number]  Place of Service where encounter took place:  [facility])     Hermilo Velasquez  is a 89 year old  (11/3/1932), admitted to the above facility from  [facility]. Hospital stay 1/15/24 through 1/21/24..   HPI:      Hospital course was reviewed by me, is as per the hospital discharge summary and nurse practitioner note    Patient has a past medical history of recurrent C. 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    He was hospitalized for evaluation of failure to thrive in the setting of diarrhea.  Patient has a history of recurrent C. difficile colitis, has been referred to the [facility] GI clinic for possible fecal transplant but has not been assessed yet.  He was started on oral vancomycin with improvement in his diarrhea.  Patient was noted to have altered mental status which improved somewhat during hospitalization.  Head CT normal.  Metabolic evaluation unremarkable.  He does have dysphagia which was evaluated by H therapy.  He does have a history of right lung cancer resection 2019.  PET scan and biopsy of the lung nodule has been ordered but canceled by the patient earlier this month.  He is not felt to be candidate for chemotherapy in any event.  There is a history of a pulmonary embolism in 2023 with incomplete treatment secondary to GI bleed.  Remote history of of IVC filter placement over 10 years  "ago, status unclear.  With regards to the history of lung cancer, patient has been noted to have multiple new lung nodules in 2023, as well as a lesion right tongue base of unclear significance.  As above he has not had a follow-up PET scan or biopsy of his lung nodule.  Recent course also remarkable for development of deep tissue pressure injury to the sacrum and bilateral buttocks.    He has done poorly at home for the last several months, has been hospitalized on 5 occasions since August 2023.  Per chart, patient's wife has requested restorative care after conversation with plan of care    Patient offers a limited history, repeatedly states \"I am doing okay \"he denies nausea, vomiting, abdominal pain, diarrhea, cough, chest pain, shortness of breath.  BIMS 13/15        CODE STATUS/ADVANCE DIRECTIVES DISCUSSION:  Prior  CPR/Full code   ALLERGIES:   Allergies   Allergen Reactions     Omeprazole Itching     Pantoprazole Itching     Prevacid [Lansoprazole] Itching     Lasix [Furosemide] Rash     Lidocaine Blisters and Rash     Allergy to lidocaine ointment  Allergy to lidocaine ointment       Penicillin G Rash     Penicillins Rash     \"broke out from injection\" 60 yrs ago  Tolerates cephalosporins      PAST MEDICAL HISTORY:   Past Medical History:   Diagnosis Date     Adenocarcinoma, lung, 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 Vasques      Aortic stenosis     Severe AS, 9/2015 AVR - [facility] TRIFECTA Bovine bioprosthesis 25MM [id number]     Atrial fibrillation (H)     9/2015 Paroxysmal post op Afib - discharged on Warfarin and a beta blocker     COPD (chronic obstructive pulmonary disease) (H)      Deep vein thrombosis (H)      GERD (gastroesophageal reflux disease)      Heart murmur      Monoclonal gammopathy     plasmacyte prominent causing monoclonal gammopathy     Need for SBE (subacute bacterial endocarditis) prophylaxis      Neuropathy      Other and unspecified hyperlipidemia      Other malignant " lymphomas     non hodgkin's lymphoma     RBBB (right bundle branch block)      Severe sepsis with acute organ dysfunction (H) 11/16/2015     Unspecified hereditary and idiopathic peripheral neuropathy       PAST SURGICAL HISTORY:   has a past surgical history that includes appendectomy; tonsillectomy; knee surgery; rotator cuff repair rt/lt; turp; hernia repair (2006); Spine surgery; Repair ligament ankle (2/23/2012); Herniorrhaphy ventral (4/17/2013); Replace valve aortic (N/A, 9/3/2015); Esophagoscopy, gastroscopy, duodenoscopy (EGD), combined (N/A, 11/28/2015); TOTAL KNEE ARTHROPLASTY; back surgery; Esophagoscopy, gastroscopy, duodenoscopy (EGD), combined (N/A, 7/26/2018); Thoracotomy, wedge resection lung, combined (Right, 3/26/2019); Lobectomy lung (Right, 3/26/2019); Esophagoscopy, gastroscopy, duodenoscopy (EGD), combined (N/A, 8/17/2020); Esophagoscopy, gastroscopy, duodenoscopy (EGD), combined (N/A, 10/24/2020); Esophagoscopy, gastroscopy, duodenoscopy (EGD), combined (N/A, 4/11/2022); Arthroplasty knee (Right, 7/25/2022); and Esophagoscopy, gastroscopy, duodenoscopy (EGD), combined (N/A, 8/26/2022).  FAMILY HISTORY: family history includes C.A.D. in his father; Emphysema in his father.  SOCIAL HISTORY:   reports that he quit smoking about 26 years ago. His smoking use included cigarettes. He has a 110 pack-year smoking history. He has never used smokeless tobacco. He reports that he does not currently use alcohol. He reports that he does not use drugs.  Patient's living condition: lives with spouse        Current Outpatient Medications   Medication Sig     acetaminophen (TYLENOL) 500 MG tablet Take 1,000 mg by mouth 3 times daily as needed     albuterol (PROAIR HFA/PROVENTIL HFA/VENTOLIN HFA) 108 (90 Base) MCG/ACT inhaler Inhale 2 puffs into the lungs every 6 hours as needed     atorvastatin (LIPITOR) 10 MG tablet Take 1 tablet (10 mg) by mouth daily     DULoxetine (CYMBALTA) 60 MG capsule Take 60 mg by  "mouth daily     famotidine (PEPCID) 40 MG tablet Take 1 tablet (40 mg) by mouth 2 times daily     ferrous sulfate (FE TABS) 325 (65 Fe) MG EC tablet Take 1 tablet (325 mg) by mouth 2 times daily     fludrocortisone (FLORINEF) 0.1 MG tablet Take 1 tablet (0.1 mg) by mouth daily     gabapentin (NEURONTIN) 300 MG capsule Take 2 capsules (600 mg) by mouth At Bedtime For neuropathy pain     hydrOXYzine HCl (ATARAX) 25 MG tablet Take 2 tablets (50 mg) by mouth every 6 hours as needed (Patient taking differently: Take 25 mg by mouth every 6 hours as needed)     ondansetron (ZOFRAN ODT) 4 MG ODT tab Take 1 tablet (4 mg) by mouth every 6 hours as needed for nausea     potassium chloride ER (KLOR-CON M) 20 MEQ CR tablet Take 20 mEq by mouth daily     tamsulosin (FLOMAX) 0.4 MG capsule Take 1 capsule (0.4 mg) by mouth daily     tiotropium (SPIRIVA) 18 MCG inhaled capsule Inhale 18 mcg into the lungs daily     vancomycin (FIRVANQ) 50 MG/ML oral solution Take 2.5 mLs (125 mg) by mouth 4 times daily Takes 4 times daily x 10 days original start date 01/15. Continue oral taper once complete     No current facility-administered medications for this visit.       ROS:  Limited secondary to cognitive impairment but today pt reports no acute concerns    Vitals:  BP (!) 158/91   Pulse 84   Temp 97.6  F (36.4  C)   Resp 20   Ht 1.778 m (5' 10\")   Wt 67.1 kg (148 lb)   SpO2 90%   BMI 21.24 kg/m    Exam:  Sleepy, frail-appearing male lying in bed, wearing oxygen.  He alerts to name, is oriented to self and surroundings, repeatedly answers questions with \"I am okay.  HEENT: Poor dentition  Hard of hearing  Lungs respiratory rate 14, unlabored.  Decreased breath sounds bilaterally  CV generally regular, occasional systolic beat.  Soft systolic murmur  Abdomen soft, nontender, nondistended  Extremities no signs of acute arthritis.  No redness pain or swelling left hand.  No edema.  Skin: Sacral area was not examined by me.  Neuro: " Orientation status difficult to assess as patient is sleepy face symmetric.  No gross focal weakness.  No tremors.      Lab/Diagnostic data:  Most Recent 3 CBC's:  Recent Labs   Lab Test 01/19/24 0836 01/16/24  0658 01/15/24  1855   WBC 6.2 9.3 10.7   HGB 9.2* 8.8* 11.0*   MCV 81 82 83   * 144* 164     Most Recent 3 BMP's:  Recent Labs   Lab Test 01/20/24  1045 01/20/24  0034 01/19/24 0836 01/16/24 0658 01/15/24  1855   NA  --   --  144 144 140   POTASSIUM 3.6 3.1* 3.1* 3.7 4.0   CHLORIDE  --   --  106 109* 100   CO2  --   --  30* 26 30*   BUN  --   --  12.7 21.0 18.3   CR  --   --  0.79 1.14 1.31*   ANIONGAP  --   --  8 9 10   AMRITA  --   --  8.4 8.9 9.6   GLC  --   --  106* 107* 155*     Most Recent 2 LFT's:  Recent Labs   Lab Test 01/15/24  1855 01/08/24  1755   AST 60* 7   ALT 15 <5   ALKPHOS 102 90   BILITOTAL 0.9 0.3     Most Recent TSH and T4:  Recent Labs   Lab Test 01/15/24  1855 10/26/23  1733 11/06/22  1007   TSH 3.89   < > 4.36*   T4  --   --  0.97    < > = values in this interval not displayed.       ASSESSMENT/PLAN:    Failure to thrive in the setting of multiple chronic medical concerns including O2 dependent COPD, recurrent C. 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  Diarrhea improved with resumption of vancomycin  Oral intake reported to be diminished.  Patient is ambulating in therapy using a walker.  He does have intermittent confusion, which per chart review may be at or close to baseline  Plan: Continue therapies.  Manage multiple medical issues as noted below monitor nutritional status.  Ongoing review of goals of care in view of possible recurrent lung cancer, possible new tongue cancer in the setting of multiple chronic medical concerns as well as malnutrition    Recurrent C. difficile colitis  Plan: Continue vancomycin with slow taper as recommended by GI  GI follow-up if  patient and family desire    Anemia  MCV low 80s  History of GI bleed  Recent iron studies suggestive of mixed iron deficiency and chronic disease  Plan: Continue Pepcid.  Continue iron.  Monitor hemoglobin    Left hand pain and swelling noted 2 days ago.  Appears resolved.  X-ray negative  Plan: Monitor    Adenocarcinoma right lung with possible recurrent disease  Possible base on tongue malignancy based on past PET scan  Plan: Oncology follow-up, with recommendations for repeat PET scan and lung biopsy  ENT consult recommended after oncology eval    Hypertension  PAF,   History of pulmonary embolism,   History of orthostatic hypotension on Florinef  Heart rate currently controlled  Plan:  Assure tamsulosin is administered at night  Monitor vital signs    COPD  O2 dependent  Overall appears stable on tiotropium and as needed albuterol  Plan: Continue current treatments                  Sincerely,        Charles You MD

## 2024-02-05 NOTE — LETTER
"    2/5/2024        RE: Hermilo Velasquez  [address] [facility] [address]        [facility] GERIATRICS    Chief Complaint   Patient presents with     RECHECK     HPI:  Hermilo Velasquez is a 89 year old  (11/3/1932), who is being seen today for an episodic care visit at: [facility]) [25]. Today's concern is:   Left hand pain/cellulitis; resolved  C-diff/failure to thrive/malnutrition: nursing report formed stool, weight 150.8lbs on admission and 140lbs currently  Nursing state stools have been formed  In therapy patient is walking up to 155 feet using a RW with CGA  Agitation/behaviors: patient alert on exam today, no agitation noted,   Lung cancer/COPD:  SaO2 90-91% on O2 2l/nc, patient denies SOB,cough, congestion  HTN/atrial fib/CKD: BP as follows 134/74, 146/77, 104/56  with HR 70-80 range, denies CP, palpitations  Cognitive impairment: BIMS 13/15 and SBT 14/28, recommend 24 hour care, patient,wife and son have declined a higher level of care, planning on taking patient home         Allergies, and PMH/PSH reviewed in [facility] today.  REVIEW OF SYSTEMS:  10 point ROS of systems including Constitutional, Eyes, Respiratory, Cardiovascular, Gastroenterology, Genitourinary, Integumentary, Musculoskeletal, Psychiatric were all negative except for pertinent positives noted in my HPI.    Objective:   /74   Pulse 76   Temp 97.6  F (36.4  C)   Resp 15   Ht 1.778 m (5' 10\")   Wt 63.5 kg (140 lb)   SpO2 91%   BMI 20.09 kg/m    GENERAL APPEARANCE:  Alert, in no distress  ENT:  Mouth and posterior oropharynx normal, moist mucous membranes, [place]  EYES:  EOM, conjunctivae, lids, pupils and irises normal, PERRL  RESP:  respiratory effort and palpation of chest normal, lungs clear to auscultation , no respiratory distress  CV:  Palpation and auscultation of heart done , regular rate and rhythm, no murmur, rub, or gallop, peripheral edema trace+ in LE bilaterally  ABDOMEN:  normal bowel sounds, " soft, nontender, no hepatosplenomegaly or other masses  M/S:   patient sitting up in w/c  SKIN:  Inspection of skin and subcutaneous tissue baseline  NEURO:   speech wnl  PSYCH:  affect and mood normal    Recent labs in [facility] reviewed by me today.  and Most Recent 3 CBC's:  Recent Labs   Lab Test 01/19/24  0836 01/16/24  0658 01/15/24  1855   WBC 6.2 9.3 10.7   HGB 9.2* 8.8* 11.0*   MCV 81 82 83   * 144* 164     Most Recent 3 BMP's:  Recent Labs   Lab Test 01/20/24  1045 01/20/24  0034 01/19/24  0836 01/16/24  0658 01/15/24  1855   NA  --   --  144 144 140   POTASSIUM 3.6 3.1* 3.1* 3.7 4.0   CHLORIDE  --   --  106 109* 100   CO2  --   --  30* 26 30*   BUN  --   --  12.7 21.0 18.3   CR  --   --  0.79 1.14 1.31*   ANIONGAP  --   --  8 9 10   AMRITA  --   --  8.4 8.9 9.6   GLC  --   --  106* 107* 155*       Assessment/Plan:  (A04.72) Colitis due to Clostridium difficile  (primary encounter diagnosis)  (R62.7) Failure to thrive in adult  (R53.81) Physical deconditioning  (E44.0) Moderate protein-calorie malnutrition (H24)  Comment: acute/ongoing, no change  Family now planning on f/u with GI  Plan: PT and OT, dietician to follow, vancomycin 125mg QID for 10 days, continue to taper on 1/26/24 will decrease to 125mg TID for 7 days then BID for 7 days, then QD for 7 days then DC        (R13.10) Dysphagia, unspecified type  Comment: acute/ongoing, no change  Plan: SLP to follow, continue dysphagia diet      (C34.91) Adenocarcinoma of right lung (H)  Comment: acute/ongoing, no change  Plan: f/u with [place] oncology as OP has appt on 2/12/24     (R22.0) Nodule of tongue  Comment: acute/ongoing, no change  Plan: f/u with ENT as OP     (I10) Essential hypertension with goal blood pressure less than 140/90  (I48.0) Paroxysmal atrial fib -- on Eliquis   (N18.30) Stage 3 chronic kidney disease, unspecified whether stage 3a or 3b CKD (H)  Comment: acute/ongoing, no change  Plan: BMP follow, continue to follow off Rx, avoid  nephrotoxic agents     (J44.0) Chronic obstructive pulmonary disease with acute lower respiratory infection (H)  Comment: ongoing, no change  Plan: on chronic O2, continue spiriva 18mcg QD and albuterol MDI 2 puffs q 6 hours prn     (R41.89) Cognitive impairment  Comment: acute/ongoing  Plan: OT ongoing, ss to assist with discharge planning  Patient wife and Son planning to discharge to home  IDT recommend 24 hour care      (D50.0) Iron deficiency anemia due to chronic blood loss  Hgb on 1/25/24 9.0 and 2/1/24 Hgb 9.1  Comment: acute/ongoing, no change  Plan: CBC follow       MED REC REQUIRED  Post Medication Reconciliation Status: medication reconcilation previously completed during another office visit      Orders:  No new orders      Electronically signed by: Tonya Lynn Haase, APRN CNP         Sincerely,        Tonya Lynn Haase, APRN CNP

## 2024-02-05 NOTE — PROGRESS NOTES
"[facility] GERIATRICS    Chief Complaint   Patient presents with    RECHECK     HPI:  Hermilo Velasquez is a 89 year old  (11/3/1932), who is being seen today for an episodic care visit at: [facility]) [25]. Today's concern is:   Left hand pain/cellulitis; resolved  C-diff/failure to thrive/malnutrition: nursing report formed stool, weight 150.8lbs on admission and 140lbs currently  Nursing state stools have been formed  In therapy patient is walking up to 155 feet using a RW with CGA  Agitation/behaviors: patient alert on exam today, no agitation noted,   Lung cancer/COPD:  SaO2 90-91% on O2 2l/nc, patient denies SOB,cough, congestion  HTN/atrial fib/CKD: BP as follows 134/74, 146/77, 104/56  with HR 70-80 range, denies CP, palpitations  Cognitive impairment: BIMS 13/15 and SBT 14/28, recommend 24 hour care, patient,wife and son have declined a higher level of care, planning on taking patient home         Allergies, and PMH/PSH reviewed in [employer] today.  REVIEW OF SYSTEMS:  10 point ROS of systems including Constitutional, Eyes, Respiratory, Cardiovascular, Gastroenterology, Genitourinary, Integumentary, Musculoskeletal, Psychiatric were all negative except for pertinent positives noted in my HPI.    Objective:   /74   Pulse 76   Temp 97.6  F (36.4  C)   Resp 15   Ht 1.778 m (5' 10\")   Wt 63.5 kg (140 lb)   SpO2 91%   BMI 20.09 kg/m    GENERAL APPEARANCE:  Alert, in no distress  ENT:  Mouth and posterior oropharynx normal, moist mucous membranes, [place]  EYES:  EOM, conjunctivae, lids, pupils and irises normal, PERRL  RESP:  respiratory effort and palpation of chest normal, lungs clear to auscultation , no respiratory distress  CV:  Palpation and auscultation of heart done , regular rate and rhythm, no murmur, rub, or gallop, peripheral edema trace+ in LE bilaterally  ABDOMEN:  normal bowel sounds, soft, nontender, no hepatosplenomegaly or other masses  M/S:   patient sitting up in " w/c  SKIN:  Inspection of skin and subcutaneous tissue baseline  NEURO:   speech wnl  PSYCH:  affect and mood normal    Recent labs in [facility] reviewed by me today.  and Most Recent 3 CBC's:  Recent Labs   Lab Test 01/19/24  0836 01/16/24  0658 01/15/24  1855   WBC 6.2 9.3 10.7   HGB 9.2* 8.8* 11.0*   MCV 81 82 83   * 144* 164     Most Recent 3 BMP's:  Recent Labs   Lab Test 01/20/24  1045 01/20/24  0034 01/19/24  0836 01/16/24  0658 01/15/24  1855   NA  --   --  144 144 140   POTASSIUM 3.6 3.1* 3.1* 3.7 4.0   CHLORIDE  --   --  106 109* 100   CO2  --   --  30* 26 30*   BUN  --   --  12.7 21.0 18.3   CR  --   --  0.79 1.14 1.31*   ANIONGAP  --   --  8 9 10   AMRITA  --   --  8.4 8.9 9.6   GLC  --   --  106* 107* 155*       Assessment/Plan:  (A04.72) Colitis due to Clostridium difficile  (primary encounter diagnosis)  (R62.7) Failure to thrive in adult  (R53.81) Physical deconditioning  (E44.0) Moderate protein-calorie malnutrition (H24)  Comment: acute/ongoing, no change  Family now planning on f/u with GI  Plan: PT and OT, dietician to follow, vancomycin 125mg QID for 10 days, continue to taper on 1/26/24 will decrease to 125mg TID for 7 days then BID for 7 days, then QD for 7 days then DC        (R13.10) Dysphagia, unspecified type  Comment: acute/ongoing, no change  Plan: SLP to follow, continue dysphagia diet      (C34.91) Adenocarcinoma of right lung (H)  Comment: acute/ongoing, no change  Plan: f/u with [place] [facility]ology as OP has appt on 2/12/24     (R22.0) Nodule of tongue  Comment: acute/ongoing, no change  Plan: f/u with ENT as OP     (I10) Essential hypertension with goal blood pressure less than 140/90  (I48.0) Paroxysmal atrial fib -- on Eliquis   (N18.30) Stage 3 chronic kidney disease, unspecified whether stage 3a or 3b CKD (H)  Comment: acute/ongoing, no change  Plan: BMP follow, continue to follow off Rx, avoid nephrotoxic agents     (J44.0) Chronic obstructive pulmonary disease with acute lower  respiratory infection (H)  Comment: ongoing, no change  Plan: on chronic O2, continue spiriva 18mcg QD and albuterol MDI 2 puffs q 6 hours prn     (R41.89) Cognitive impairment  Comment: acute/ongoing  Plan: OT ongoing, ss to assist with discharge planning  Patient wife and Son planning to discharge to home  IDT recommend 24 hour care      (D50.0) Iron deficiency anemia due to chronic blood loss  Hgb on 1/25/24 9.0 and 2/1/24 Hgb 9.1  Comment: acute/ongoing, no change  Plan: CBC follow       MED REC REQUIRED  Post Medication Reconciliation Status: medication reconcilation previously completed during another office visit      Orders:  No new orders      Electronically signed by: Tonya Lynn Haase, APRN CNP

## 2024-02-07 NOTE — LETTER
"    2/7/2024        RE: Hermilo Velasquez  [address] [facility] [address]        [facility] GERIATRICS    Chief Complaint   Patient presents with     RECHECK     HPI:  Hermilo Velasquez is a 89 year old  (11/3/1932), who is being seen today for an episodic care visit at: [facility]) [25]. Today's concern is:   C-diff/failure to thrive/malnutrition: nursing report formed stool, weight 150.8lbs on admission and 140.1lbs currently  Nursing state stools have been formed  In therapy patient is walking up to feet 160 using a RW with CGA, making slow progress  Agitation/behaviors: patient alert on exam today, no agitation noted,   Lung cancer/COPD:  SaO2 90-91% on O2 2l/nc, patient denies SOB,cough, congestion  HTN/atrial fib/CKD: BP as follows 06236, 130/67, 134/74  with HR 70-80 range, denies CP, palpitations  Cognitive impairment: BIMS 13/15 and SBT 14/28, recommend 24 hour care, patient,wife and son have declined a higher level of care, planning on taking patient home          Allergies, and PMH/PSH reviewed in [employer] today.  REVIEW OF SYSTEMS:  10 point ROS of systems including Constitutional, Eyes, Respiratory, Cardiovascular, Gastroenterology, Genitourinary, Integumentary, Musculoskeletal, Psychiatric were all negative except for pertinent positives noted in my HPI.    Objective:   BP (!) 140/74   Pulse 84   Temp 97.8  F (36.6  C)   Resp 20   Ht 1.778 m (5' 10\")   Wt 63.5 kg (140 lb)   SpO2 92%   BMI 20.09 kg/m    GENERAL APPEARANCE:  Alert, in no distress  ENT:  Mouth and posterior oropharynx normal, moist mucous membranes, [place]  EYES:  EOM, conjunctivae, lids, pupils and irises normal, PERRL  RESP:  respiratory effort and palpation of chest normal, lungs clear to auscultation , no respiratory distress  CV:  Palpation and auscultation of heart done , regular rate and rhythm, no murmur, rub, or gallop, peripheral edema trace+ in LE bilaterally  ABDOMEN:  normal bowel sounds, soft, " nontender, no hepatosplenomegaly or other masses  M/S:   patient resting in bed at time of exam  SKIN:  Inspection of skin and subcutaneous tissue baseline  NEURO:   speech wnl  PSYCH:  affect and mood normal    Recent labs in [facility] reviewed by me today.  and Most Recent 3 CBC's:  Recent Labs   Lab Test 01/19/24  0836 01/16/24  0658 01/15/24  1855   WBC 6.2 9.3 10.7   HGB 9.2* 8.8* 11.0*   MCV 81 82 83   * 144* 164     Most Recent 3 BMP's:  Recent Labs   Lab Test 01/20/24  1045 01/20/24  0034 01/19/24  0836 01/16/24  0658 01/15/24  1855   NA  --   --  144 144 140   POTASSIUM 3.6 3.1* 3.1* 3.7 4.0   CHLORIDE  --   --  106 109* 100   CO2  --   --  30* 26 30*   BUN  --   --  12.7 21.0 18.3   CR  --   --  0.79 1.14 1.31*   ANIONGAP  --   --  8 9 10   AMRITA  --   --  8.4 8.9 9.6   GLC  --   --  106* 107* 155*       Assessment/Plan:  (A04.72) Colitis due to Clostridium difficile  (primary encounter diagnosis)  (R62.7) Failure to thrive in adult  (R53.81) Physical deconditioning  (E44.0) Moderate protein-calorie malnutrition (H24)  Comment: acute/ongoing, no change  Family now planning on f/u with GI  Plan: PT and OT, dietician to follow, vancomycin 125mg QID for 10 days, continue to taper on 1/26/24 will decrease to 125mg TID for 7 days then BID for 7 days, then QD for 7 days then DC        (R13.10) Dysphagia, unspecified type  Comment: acute/ongoing, no change  Plan: SLP to follow, continue dysphagia diet      (C34.91) Adenocarcinoma of right lung (H)  Comment: acute/ongoing, no change  Plan[place] with [facility] as [place] has appt on 2/12/24     (R22.0) Nodule of tongue  Comment: acute/ongoing, no change  Plan: f/u with ENT as OP     (I10) Essential hypertension with goal blood pressure less than 140/90  (I48.0) Paroxysmal atrial fib -- on Eliquis   (N18.30) Stage 3 chronic kidney disease, unspecified whether stage 3a or 3b CKD (H)  Comment: acute/ongoing, no change  Plan: BMP follow, continue to follow off Rx, avoid  nephrotoxic agents     (J44.0) Chronic obstructive pulmonary disease with acute lower respiratory infection (H)  Comment: ongoing, no change  Plan: on chronic O2, continue spiriva 18mcg QD and albuterol MDI 2 puffs q 6 hours prn     (R41.89) Cognitive impairment  Comment: acute/ongoing, no change  Plan: OT ongoing, ss to assist with discharge planning  Patient wife and Son planning to discharge to home  IDT recommend 24 hour care      (D50.0) Iron deficiency anemia due to chronic blood loss  Hgb on 1/25/24 9.0 and 2/1/24 Hgb 9.1  Comment: acute/ongoing, no change  Plan: CBC follow       MED REC REQUIRED  Post Medication Reconciliation Status: medication reconcilation previously completed during another office visit      Orders:  No new orders      Electronically signed by: Tonya Lynn Haase, APRN CNP         Sincerely,        Tonya Lynn Haase, APRN CNP

## 2024-02-07 NOTE — PROGRESS NOTES
"[facility] GERIATRICS    Chief Complaint   Patient presents with    RECHECK     HPI:  Hermilo Velasquez is a 89 year old  (11/3/1932), who is being seen today for an episodic care visit at: [facility]) [25]. Today's concern is:   C-diff/failure to thrive/malnutrition: nursing report formed stool, weight 150.8lbs on admission and 140.1lbs currently  Nursing state stools have been formed  In therapy patient is walking up to feet 160 using a RW with CGA, making slow progress  Agitation/behaviors: patient alert on exam today, no agitation noted,   Lung cancer/COPD:  SaO2 90-91% on O2 2l/nc, patient denies SOB,cough, congestion  HTN/atrial fib/CKD: BP as follows 49715, 130/67, 134/74  with HR 70-80 range, denies CP, palpitations  Cognitive impairment: BIMS 13/15 and SBT 14/28, recommend 24 hour care, patient,wife and son have declined a higher level of care, planning on taking patient home          Allergies, and PMH/PSH reviewed in [facility] today.  REVIEW OF SYSTEMS:  10 point ROS of systems including Constitutional, Eyes, Respiratory, Cardiovascular, Gastroenterology, Genitourinary, Integumentary, Musculoskeletal, Psychiatric were all negative except for pertinent positives noted in my HPI.    Objective:   BP (!) 140/74   Pulse 84   Temp 97.8  F (36.6  C)   Resp 20   Ht 1.778 m (5' 10\")   Wt 63.5 kg (140 lb)   SpO2 92%   BMI 20.09 kg/m    GENERAL APPEARANCE:  Alert, in no distress  ENT:  Mouth and posterior oropharynx normal, moist mucous membranes, [place]  EYES:  EOM, conjunctivae, lids, pupils and irises normal, PERRL  RESP:  respiratory effort and palpation of chest normal, lungs clear to auscultation , no respiratory distress  CV:  Palpation and auscultation of heart done , regular rate and rhythm, no murmur, rub, or gallop, peripheral edema trace+ in LE bilaterally  ABDOMEN:  normal bowel sounds, soft, nontender, no hepatosplenomegaly or other masses  M/S:   patient resting in bed at time of " exam  SKIN:  Inspection of skin and subcutaneous tissue baseline  NEURO:   speech wnl  PSYCH:  affect and mood normal    Recent labs in [facility] reviewed by me today.  and Most Recent 3 CBC's:  Recent Labs   Lab Test 01/19/24  0836 01/16/24  0658 01/15/24  1855   WBC 6.2 9.3 10.7   HGB 9.2* 8.8* 11.0*   MCV 81 82 83   * 144* 164     Most Recent 3 BMP's:  Recent Labs   Lab Test 01/20/24  1045 01/20/24  0034 01/19/24  0836 01/16/24  0658 01/15/24  1855   NA  --   --  144 144 140   POTASSIUM 3.6 3.1* 3.1* 3.7 4.0   CHLORIDE  --   --  106 109* 100   CO2  --   --  30* 26 30*   BUN  --   --  12.7 21.0 18.3   CR  --   --  0.79 1.14 1.31*   ANIONGAP  --   --  8 9 10   AMRITA  --   --  8.4 8.9 9.6   GLC  --   --  106* 107* 155*       Assessment/Plan:  (A04.72) Colitis due to Clostridium difficile  (primary encounter diagnosis)  (R62.7) Failure to thrive in adult  (R53.81) Physical deconditioning  (E44.0) Moderate protein-calorie malnutrition (H24)  Comment: acute/ongoing, no change  Family now planning on f/u with GI  Plan: PT and OT, dietician to follow, vancomycin 125mg QID for 10 days, continue to taper on 1/26/24 will decrease to 125mg TID for 7 days then BID for 7 days, then QD for 7 days then DC        (R13.10) Dysphagia, unspecified type  Comment: acute/ongoing, no change  Plan: SLP to follow, continue dysphagia diet      (C34.91) Adenocarcinoma of right lung (H)  Comment: acute/ongoing, no change  Plan[place] [facility] [place] appt on 2/12/24     (R22.0) Nodule of tongue  Comment: acute/ongoing, no change  Plan: f/u with ENT as OP     (I10) Essential hypertension with goal blood pressure less than 140/90  (I48.0) Paroxysmal atrial fib -- on Eliquis   (N18.30) Stage 3 chronic kidney disease, unspecified whether stage 3a or 3b CKD (H)  Comment: acute/ongoing, no change  Plan: BMP follow, continue to follow off Rx, avoid nephrotoxic agents     (J44.0) Chronic obstructive pulmonary disease with acute lower  respiratory infection (H)  Comment: ongoing, no change  Plan: on chronic O2, continue spiriva 18mcg QD and albuterol MDI 2 puffs q 6 hours prn     (R41.89) Cognitive impairment  Comment: acute/ongoing, no change  Plan: OT ongoing, ss to assist with discharge planning  Patient wife and Son planning to discharge to home  IDT recommend 24 hour care      (D50.0) Iron deficiency anemia due to chronic blood loss  Hgb on 1/25/24 9.0 and 2/1/24 Hgb 9.1  Comment: acute/ongoing, no change  Plan: CBC follow       MED REC REQUIRED  Post Medication Reconciliation Status: medication reconcilation previously completed during another office visit      Orders:  No new orders      Electronically signed by: Tonya Lynn Haase, APRN CNP

## 2024-02-09 NOTE — PROGRESS NOTES
"[facility] GERIATRICS    Chief Complaint   Patient presents with    RECHECK     HPI:  Hermilo Velasquez is a 89 year old  (11/3/1932), who is being seen today for an episodic care visit at: [facility]) [25]. Today's concern is:   C-diff/failure to thrive/malnutrition: nursing report formed stool, weight 150.8lbs on admission and 140.1lbs currently  Nursing state stools have been formed  In therapy patient is walking 100-150ft  using a RW with CGA, making slow progress  Agitation/behaviors: patient alert on exam today, no agitation noted,   Lung cancer/COPD:  SaO2 992-93% on O2 2l/nc, patient denies SOB,cough, congestion  HTN/atrial fib/CKD: BP as follows 144/87, 134/85, 140/74 with HR 80 range, denies CP, palpitations  Cognitive impairment: BIMS 13/15 and SBT 14/28, recommend 24 hour care, patient,wife and son have declined a higher level of care, planning on taking patient home       Allergies, and PMH/PSH reviewed in [facility] today.  REVIEW OF SYSTEMS:  10 point ROS of systems including Constitutional, Eyes, Respiratory, Cardiovascular, Gastroenterology, Genitourinary, Integumentary, Musculoskeletal, Psychiatric were all negative except for pertinent positives noted in my HPI.    Objective:   BP (!) 144/87   Pulse 89   Temp 98.4  F (36.9  C)   Resp 16   Ht 1.778 m (5' 10\")   Wt 63.5 kg (140 lb)   SpO2 92%   BMI 20.09 kg/m    GENERAL APPEARANCE:  Alert, in no distress  ENT:  Mouth and posterior oropharynx normal, moist mucous membranes, [place]  EYES:  EOM, conjunctivae, lids, pupils and irises normal, PERRL  RESP:  respiratory effort and palpation of chest normal, lungs clear to auscultation , no respiratory distress  CV:  Palpation and auscultation of heart done , regular rate and rhythm, no murmur, rub, or gallop, peripheral edema trace+ in LE bilaterally  ABDOMEN:  normal bowel sounds, soft, nontender, no hepatosplenomegaly or other masses  M/S:   Examination of:   patient resting in bed, " able to move all4 extremities  .  SKIN:  Inspection of skin and subcutaneous tissue baseline  NEURO:   speech wnl  PSYCH:  affect and mood normal    Recent labs in [facility] reviewed by me today.  and Most Recent 3 CBC's:  Recent Labs   Lab Test 01/19/24  0836 01/16/24  0658 01/15/24  1855   WBC 6.2 9.3 10.7   HGB 9.2* 8.8* 11.0*   MCV 81 82 83   * 144* 164     Most Recent 3 BMP's:  Recent Labs   Lab Test 01/20/24  1045 01/20/24  0034 01/19/24  0836 01/16/24  0658 01/15/24  1855   NA  --   --  144 144 140   POTASSIUM 3.6 3.1* 3.1* 3.7 4.0   CHLORIDE  --   --  106 109* 100   CO2  --   --  30* 26 30*   BUN  --   --  12.7 21.0 18.3   CR  --   --  0.79 1.14 1.31*   ANIONGAP  --   --  8 9 10   AMRITA  --   --  8.4 8.9 9.6   GLC  --   --  106* 107* 155*       Assessment/Plan:  (A04.72) Colitis due to Clostridium difficile  (primary encounter diagnosis)  (R62.7) Failure to thrive in adult  (R53.81) Physical deconditioning  (E44.0) Moderate protein-calorie malnutrition (H24)  Comment: acute/ongoing, no change  Family now planning on f/u with GI  Plan: PT and OT, dietician to follow, vancomycin tapered down to 125mg QD will continue until f/u with GI  Patient is taking dietary supplements boost breeze and mighty shake        (R13.10) Dysphagia, unspecified type  Comment: acute/ongoing, no change  Plan: SLP to follow, continue dysphagia diet      (C34.91) Adenocarcinoma of right lung (H)  Comment: acute/ongoing, no change  Plan[place] [facility] [place] appt on 2/12/24     (R22.0) Nodule of tongue  Comment: acute/ongoing, no change  Plan: f/u with ENT as OP     (I10) Essential hypertension with goal blood pressure less than 140/90  (I48.0) Paroxysmal atrial fib -- on Eliquis   (N18.30) Stage 3 chronic kidney disease, unspecified whether stage 3a or 3b CKD (H)  Comment: acute/ongoing, no change  Plan: BMP follow, continue to follow off Rx, avoid nephrotoxic agents     (J44.0) Chronic obstructive pulmonary disease with  acute lower respiratory infection (H)  Comment: ongoing, no change  Plan: on chronic O2, continue spiriva 18mcg QD and albuterol MDI 2 puffs q 6 hours prn     (R41.89) Cognitive impairment  Comment: acute/ongoing, no change  Plan: OT ongoing, ss to assist with discharge planning  Patient wife and Son planning to discharge to home  IDT recommend 24 hour care      (D50.0) Iron deficiency anemia due to chronic blood loss  Hgb on 1/25/24 9.0 and 2/1/24 Hgb 9.1  Comment: acute/ongoing, no change  Plan: CBC follow       MED REC REQUIRED  Post Medication Reconciliation Status: medication reconcilation previously completed during another office visit      Orders:  No new orders      Electronically signed by: Tonya Lynn Haase, APRN CNP

## 2024-02-09 NOTE — LETTER
"    2/9/2024        RE: Hermilo Velasquez  [address] [facility] [address]        [facility] GERIATRICS    Chief Complaint   Patient presents with     RECHECK     HPI:  Hermilo Velasquez is a 89 year old  (11/3/1932), who is being seen today for an episodic care visit at: [facility]) [25]. Today's concern is:   C-diff/failure to thrive/malnutrition: nursing report formed stool, weight 150.8lbs on admission and 140.1lbs currently  Nursing state stools have been formed  In therapy patient is walking 100-150ft  using a RW with CGA, making slow progress  Agitation/behaviors: patient alert on exam today, no agitation noted,   Lung cancer/COPD:  SaO2 992-93% on O2 2l/nc, patient denies SOB,cough, congestion  HTN/atrial fib/CKD: BP as follows 144/87, 134/85, 140/74 with HR 80 range, denies CP, palpitations  Cognitive impairment: BIMS 13/15 and SBT 14/28, recommend 24 hour care, patient,wife and son have declined a higher level of care, planning on taking patient home       Allergies, and PMH/PSH reviewed in [employer] today.  REVIEW OF SYSTEMS:  10 point ROS of systems including Constitutional, Eyes, Respiratory, Cardiovascular, Gastroenterology, Genitourinary, Integumentary, Musculoskeletal, Psychiatric were all negative except for pertinent positives noted in my HPI.    Objective:   BP (!) 144/87   Pulse 89   Temp 98.4  F (36.9  C)   Resp 16   Ht 1.778 m (5' 10\")   Wt 63.5 kg (140 lb)   SpO2 92%   BMI 20.09 kg/m    GENERAL APPEARANCE:  Alert, in no distress  ENT:  Mouth and posterior oropharynx normal, moist mucous membranes, [place]  EYES:  EOM, conjunctivae, lids, pupils and irises normal, PERRL  RESP:  respiratory effort and palpation of chest normal, lungs clear to auscultation , no respiratory distress  CV:  Palpation and auscultation of heart done , regular rate and rhythm, no murmur, rub, or gallop, peripheral edema trace+ in LE bilaterally  ABDOMEN:  normal bowel sounds, soft, nontender, " no hepatosplenomegaly or other masses  M/S:   Examination of:   patient resting in bed, able to move all4 extremities  .  SKIN:  Inspection of skin and subcutaneous tissue baseline  NEURO:   speech wnl  PSYCH:  affect and mood normal    Recent labs in [facility] reviewed by me today.  and Most Recent 3 CBC's:  Recent Labs   Lab Test 01/19/24  0836 01/16/24  0658 01/15/24  1855   WBC 6.2 9.3 10.7   HGB 9.2* 8.8* 11.0*   MCV 81 82 83   * 144* 164     Most Recent 3 BMP's:  Recent Labs   Lab Test 01/20/24  1045 01/20/24  0034 01/19/24  0836 01/16/24  0658 01/15/24  1855   NA  --   --  144 144 140   POTASSIUM 3.6 3.1* 3.1* 3.7 4.0   CHLORIDE  --   --  106 109* 100   CO2  --   --  30* 26 30*   BUN  --   --  12.7 21.0 18.3   CR  --   --  0.79 1.14 1.31*   ANIONGAP  --   --  8 9 10   AMRITA  --   --  8.4 8.9 9.6   GLC  --   --  106* 107* 155*       Assessment/Plan:  (A04.72) Colitis due to Clostridium difficile  (primary encounter diagnosis)  (R62.7) Failure to thrive in adult  (R53.81) Physical deconditioning  (E44.0) Moderate protein-calorie malnutrition (H24)  Comment: acute/ongoing, no change  Family now planning on f/u with GI  Plan: PT and OT, dietician to follow, vancomycin tapered down to 125mg QD will continue until f/u with GI  Patient is taking dietary supplements boost breeze and mighty shake        (R13.10) Dysphagia, unspecified type  Comment: acute/ongoing, no change  Plan: SLP to follow, continue dysphagia diet      (C34.91) Adenocarcinoma of right lung (H)  Comment: acute/ongoing, no change  Plan[place] [facility] [place] has appt on 2/12/24     (R22.0) Nodule of tongue  Comment: acute/ongoing, no change  Plan: f/u with ENT as OP     (I10) Essential hypertension with goal blood pressure less than 140/90  (I48.0) Paroxysmal atrial fib -- on Eliquis   (N18.30) Stage 3 chronic kidney disease, unspecified whether stage 3a or 3b CKD (H)  Comment: acute/ongoing, no change  Plan: BMP follow, continue to follow off  Rx, avoid nephrotoxic agents     (J44.0) Chronic obstructive pulmonary disease with acute lower respiratory infection (H)  Comment: ongoing, no change  Plan: on chronic O2, continue spiriva 18mcg QD and albuterol MDI 2 puffs q 6 hours prn     (R41.89) Cognitive impairment  Comment: acute/ongoing, no change  Plan: OT ongoing, ss to assist with discharge planning  Patient wife and Son planning to discharge to home  IDT recommend 24 hour care      (D50.0) Iron deficiency anemia due to chronic blood loss  Hgb on 1/25/24 9.0 and 2/1/24 Hgb 9.1  Comment: acute/ongoing, no change  Plan: CBC follow       MED REC REQUIRED  Post Medication Reconciliation Status: medication reconcilation previously completed during another office visit      Orders:  No new orders      Electronically signed by: Tonya Lynn Haase, APRN CNP         Sincerely,        Tonya Lynn Haase, APRN CNP

## 2024-02-12 NOTE — LETTER
"    2/12/2024        RE: Hermilo Velasquez  [address] [facility] [address]        [facility] GERIATRICS    Chief Complaint   Patient presents with     RECHECK     HPI:  Hermilo Velasquez is a 89 year old  (11/3/1932), who is being seen today for an episodic care visit at: [facility]) [25]. Today's concern is:   C-diff/failure to thrive/malnutrition: nursing report formed stool, weight 150.8lbs on admission and 138.7lbs currently, dietician has started mighty shake and boost breeze for dietary supplements  Nursing state stools have been formed  In therapy patient is walking up to 190ft  using a RW with CGA, making slow progress  Agitation/behaviors: patient alert on exam today, no agitation noted,   Lung cancer/COPD:  SaO2 92-94% on O2 2l/nc, patient denies SOB,cough, congestion  HTN/atrial fib/CKD: BP as follows 127/75, 130/86, 150/87 with HR 80-90 range, denies CP, palpitations  Cognitive impairment: BIMS 13/15 and SBT 14/28, recommend 24 hour care, patient,wife and son have declined a higher level of care, planning on taking patient home    Allergies, and PMH/PSH reviewed in [facility] today.  REVIEW OF SYSTEMS:  10 point ROS of systems including Constitutional, Eyes, Respiratory, Cardiovascular, Gastroenterology, Genitourinary, Integumentary, Musculoskeletal, Psychiatric were all negative except for pertinent positives noted in my HPI.    Objective:   /75   Pulse 96   Temp 98  F (36.7  C)   Resp 14   Ht 1.778 m (5' 10\")   Wt 62.6 kg (138 lb)   SpO2 92%   BMI 19.80 kg/m    GENERAL APPEARANCE:  Alert, in no distress  ENT:  Mouth and posterior oropharynx normal, moist mucous membranes, [place]  EYES:  EOM, conjunctivae, lids, pupils and irises normal, PERRL  RESP:  respiratory effort and palpation of chest normal, lungs clear to auscultation , no respiratory distress  CV:  Palpation and auscultation of heart done , regular rate and rhythm, no murmur, rub, or gallop, peripheral edema " trace+ in LE bilaterally  ABDOMEN:  normal bowel sounds, soft, nontender, no hepatosplenomegaly or other masses  M/S:   patient sitting up in w/c  SKIN:  Inspection of skin and subcutaneous tissue baseline  NEURO:   speech wnl  PSYCH:  affect and mood normal    Recent labs in [facility] reviewed by me today.  and Most Recent 3 CBC's:  Recent Labs   Lab Test 01/19/24  0836 01/16/24  0658 01/15/24  1855   WBC 6.2 9.3 10.7   HGB 9.2* 8.8* 11.0*   MCV 81 82 83   * 144* 164     Most Recent 3 BMP's:  Recent Labs   Lab Test 01/20/24  1045 01/20/24  0034 01/19/24  0836 01/16/24  0658 01/15/24  1855   NA  --   --  144 144 140   POTASSIUM 3.6 3.1* 3.1* 3.7 4.0   CHLORIDE  --   --  106 109* 100   CO2  --   --  30* 26 30*   BUN  --   --  12.7 21.0 18.3   CR  --   --  0.79 1.14 1.31*   ANIONGAP  --   --  8 9 10   AMRITA  --   --  8.4 8.9 9.6   GLC  --   --  106* 107* 155*       Assessment/Plan:  (A04.72) Colitis due to Clostridium difficile  (primary encounter diagnosis)  (R62.7) Failure to thrive in adult  (R53.81) Physical deconditioning  (E44.0) Moderate protein-calorie malnutrition (H24)  Comment: acute/ongoing, no change  Family now planning on f/u with GI  Plan: PT and OT, dietician to follow, vancomycin tapered down to 125mg QD will continue until f/u with GI  Patient is taking dietary supplements boost breeze and mighty shake        (R13.10) Dysphagia, unspecified type  Comment: acute/ongoing, no change  Plan: SLP to follow, continue dysphagia diet      (C34.91) Adenocarcinoma of right lung (H)  Comment: acute/ongoing, no change  Plan: f/u with [place] [facility] as [place] has appt on 2/12/24     (R22.0) Nodule of tongue  Comment: acute/ongoing, no change  Plan: f/u with ENT as OP     (I10) Essential hypertension with goal blood pressure less than 140/90  (I48.0) Paroxysmal atrial fib -- on Eliquis   (N18.30) Stage 3 chronic kidney disease, unspecified whether stage 3a or 3b CKD (H)  Comment: acute/ongoing, no change  Plan: BMP  follow, continue to follow off Rx, avoid nephrotoxic agents     (J44.0) Chronic obstructive pulmonary disease with acute lower respiratory infection (H)  Comment: ongoing, no change  Plan: on chronic O2, continue spiriva 18mcg QD and albuterol MDI 2 puffs q 6 hours prn     (R41.89) Cognitive impairment  Comment: acute/ongoing, no change  Plan: OT ongoing, ss to assist with discharge planning  Patient wife and Son planning to discharge to home  IDT recommend 24 hour care      (D50.0) Iron deficiency anemia due to chronic blood loss  Hgb on 1/25/24 9.0 and 2/1/24 Hgb 9.1  Comment: acute/ongoing, no change  Plan: CBC follow    MED REC REQUIRED  Post Medication Reconciliation Status: medication reconcilation previously completed during another office visit      Orders:  No new orders      Electronically signed by: Tonya Lynn Haase, APRN CNP         Sincerely,        Tonya Lynn Haase, APRN CNP

## 2024-02-12 NOTE — TELEPHONE ENCOUNTER
"[facility] Triage Nurse Telephone Encounter    Provider: Tonya Haase, APRN CNP  Facility: [facility] Facility Type:  TCU    Caller: Meche  Call Back Number: [phone]    Allergies:    Allergies   Allergen Reactions    Omeprazole Itching    Pantoprazole Itching    Prevacid [Lansoprazole] Itching    Lasix [Furosemide] Rash    Lidocaine Blisters and Rash     Allergy to lidocaine ointment  Allergy to lidocaine ointment      Penicillin G Rash    Penicillins Rash     \"broke out from injection\" 60 yrs ago  Tolerates cephalosporins        Reason for call: Nurse is calling to report that patient is c/o left ear pain rated 4/10.  Patient states his pain is in the inner ear and radiates down his left jaw.  No redness or warmth to the external ear.  No submandibular swelling.  No c/o nasal or sinus congestion or sore throat.  Patient is afebrile.      Verbal Order/Direction given by Provider: Warm pack to the left ear/jaw TID for 15 minutes each time.  NP to follow up with patient tomorrow.      Provider giving Order:  Tonya Haase, APRN CNP    Verbal Order given to: Didi Carty RN      "

## 2024-02-12 NOTE — PROGRESS NOTES
"[facility] GERIATRICS    Chief Complaint   Patient presents with    RECHECK     HPI:  Hermilo Velasquez is a 89 year old  (11/3/1932), who is being seen today for an episodic care visit at: [facility]) [25]. Today's concern is:   C-diff/failure to thrive/malnutrition: nursing report formed stool, weight 150.8lbs on admission and 138.7lbs currently, dietician has started mighty shake and boost breeze for dietary supplements  Nursing state stools have been formed  In therapy patient is walking up to 190ft  using a RW with CGA, making slow progress  Agitation/behaviors: patient alert on exam today, no agitation noted,   Lung cancer/COPD:  SaO2 92-94% on O2 2l/nc, patient denies SOB,cough, congestion  HTN/atrial fib/CKD: BP as follows 127/75, 130/86, 150/87 with HR 80-90 range, denies CP, palpitations  Cognitive impairment: BIMS 13/15 and SBT 14/28, recommend 24 hour care, patient,wife and son have declined a higher level of care, planning on taking patient home    Allergies, and PMH/PSH reviewed in [facility] today.  REVIEW OF SYSTEMS:  10 point ROS of systems including Constitutional, Eyes, Respiratory, Cardiovascular, Gastroenterology, Genitourinary, Integumentary, Musculoskeletal, Psychiatric were all negative except for pertinent positives noted in my HPI.    Objective:   /75   Pulse 96   Temp 98  F (36.7  C)   Resp 14   Ht 1.778 m (5' 10\")   Wt 62.6 kg (138 lb)   SpO2 92%   BMI 19.80 kg/m    GENERAL APPEARANCE:  Alert, in no distress  ENT:  Mouth and posterior oropharynx normal, moist mucous membranes, [place]  EYES:  EOM, conjunctivae, lids, pupils and irises normal, PERRL  RESP:  respiratory effort and palpation of chest normal, lungs clear to auscultation , no respiratory distress  CV:  Palpation and auscultation of heart done , regular rate and rhythm, no murmur, rub, or gallop, peripheral edema trace+ in LE bilaterally  ABDOMEN:  normal bowel sounds, soft, nontender, no " hepatosplenomegaly or other masses  M/S:   patient sitting up in w/c  SKIN:  Inspection of skin and subcutaneous tissue baseline  NEURO:   speech wnl  PSYCH:  affect and mood normal    Recent labs in [facility] reviewed by me today.  and Most Recent 3 CBC's:  Recent Labs   Lab Test 01/19/24  0836 01/16/24  0658 01/15/24  1855   WBC 6.2 9.3 10.7   HGB 9.2* 8.8* 11.0*   MCV 81 82 83   * 144* 164     Most Recent 3 BMP's:  Recent Labs   Lab Test 01/20/24  1045 01/20/24  0034 01/19/24  0836 01/16/24  0658 01/15/24  1855   NA  --   --  144 144 140   POTASSIUM 3.6 3.1* 3.1* 3.7 4.0   CHLORIDE  --   --  106 109* 100   CO2  --   --  30* 26 30*   BUN  --   --  12.7 21.0 18.3   CR  --   --  0.79 1.14 1.31*   ANIONGAP  --   --  8 9 10   AMRITA  --   --  8.4 8.9 9.6   GLC  --   --  106* 107* 155*       Assessment/Plan:  (A04.72) Colitis due to Clostridium difficile  (primary encounter diagnosis)  (R62.7) Failure to thrive in adult  (R53.81) Physical deconditioning  (E44.0) Moderate protein-calorie malnutrition (H24)  Comment: acute/ongoing, no change  Family now planning on f/u with GI  Plan: PT and OT, dietician to follow, vancomycin tapered down to 125mg QD will continue until f/u with GI  Patient is taking dietary supplements boost breeze and mighty shake        (R13.10) Dysphagia, unspecified type  Comment: acute/ongoing, no change  Plan: SLP to follow, continue dysphagia diet      (C34.91) Adenocarcinoma of right lung (H)  Comment: acute/ongoing, no change  Plan[place] [facility] as [place] has appt on 2/12/24     (R22.0) Nodule of tongue  Comment: acute/ongoing, no change  Plan: f/u with ENT as OP     (I10) Essential hypertension with goal blood pressure less than 140/90  (I48.0) Paroxysmal atrial fib -- on Eliquis   (N18.30) Stage 3 chronic kidney disease, unspecified whether stage 3a or 3b CKD (H)  Comment: acute/ongoing, no change  Plan: BMP follow, continue to follow off Rx, avoid nephrotoxic agents     (J44.0) Chronic  obstructive pulmonary disease with acute lower respiratory infection (H)  Comment: ongoing, no change  Plan: on chronic O2, continue spiriva 18mcg QD and albuterol MDI 2 puffs q 6 hours prn     (R41.89) Cognitive impairment  Comment: acute/ongoing, no change  Plan: OT ongoing, ss to assist with discharge planning  Patient wife and Son planning to discharge to home  IDT recommend 24 hour care      (D50.0) Iron deficiency anemia due to chronic blood loss  Hgb on 1/25/24 9.0 and 2/1/24 Hgb 9.1  Comment: acute/ongoing, no change  Plan: CBC follow    MED REC REQUIRED  Post Medication Reconciliation Status: medication reconcilation previously completed during another office visit      Orders:  No new orders      Electronically signed by: Tonya Lynn Haase, APRN CNP

## 2024-02-13 NOTE — LETTER
2/13/2024        RE: Hermilo Velasquez  [address]  [facility] [address]        [facility] GERIATRICS DISCHARGE SUMMARY  PATIENT'S NAME: Hermilo Velasquez  YOB: 1932  MEDICAL RECORD NUMBER:  [id number]  Place of Service where encounter took place:  [facility]) [25]    PRIMARY CARE PROVIDER AND CLINIC RESPONSIBLE AFTER TRANSFER:   Karson Bishop MD, [address] / [place] [address]    [facility] Provider     Transferring providers: Tonya Lynn Haase, APRN CNP, Charles You MD  Recent Hospitalization/ED:  [facility] Hospital stay 1/15/24 to 1/21/24.  Date of SNF Admission: January 21, 2024  Date of SNF (anticipated) Discharge: February 14, 2024  Discharged to: previous independent home  Cognitive Scores: BIMS: 13/15 and Short blessed: 14/28  Physical Function: Ambulating 190 ft with RW with CGA  DME: Walker    CODE STATUS/ADVANCE DIRECTIVES DISCUSSION:  Prior   ALLERGIES: Omeprazole, Pantoprazole, Prevacid [lansoprazole], Lasix [furosemide], Lidocaine, Penicillin g, and Penicillins    NURSING FACILITY COURSE   Medication Changes/Rationale:   See assessment and plan    Summary of nursing facility stay:   Patient progressed to walking up to 190 feet using a RW with CGA, requires cues for safety, very [place], cognitive impairement with a MOCA of 9/30 indicating severe cognitive impairement, requires assistance with ADL's and toileting. Therapy do not feel comfortable training wife for caregiver training, therapy states patient's wife has very poor balance. Patient and family (wife and Son) have decided to take patient home, where he lives with wife. Therapy and team [facility] at [facility] are recommending 24 hour supervision and assistance. Family has declined. Patient will discharge to home with home PT, OT, RN, HHA  and SW through [facility]. A KAREEN report will be filed d/t concerns for unsafe discharge plan    Discharge Medications:  MED REC  REQUIRED  Post Medication Reconciliation Status: discharge medications reconciled and changed, per note/orders       Current Outpatient Medications   Medication Sig Dispense Refill     acetaminophen (TYLENOL) 500 MG tablet Take 1,000 mg by mouth 3 times daily as needed       albuterol (PROAIR HFA/PROVENTIL HFA/VENTOLIN HFA) 108 (90 Base) MCG/ACT inhaler Inhale 2 puffs into the lungs every 6 hours as needed       atorvastatin (LIPITOR) 10 MG tablet Take 1 tablet (10 mg) by mouth daily 90 tablet 0     DULoxetine (CYMBALTA) 60 MG capsule Take 60 mg by mouth daily       famotidine (PEPCID) 40 MG tablet Take 1 tablet (40 mg) by mouth 2 times daily 60 tablet 0     ferrous sulfate (FE TABS) 325 (65 Fe) MG EC tablet Take 1 tablet (325 mg) by mouth 2 times daily       fludrocortisone (FLORINEF) 0.1 MG tablet Take 1 tablet (0.1 mg) by mouth daily 90 tablet 3     gabapentin (NEURONTIN) 300 MG capsule Take 2 capsules (600 mg) by mouth At Bedtime For neuropathy pain       ondansetron (ZOFRAN ODT) 4 MG ODT tab Take 1 tablet (4 mg) by mouth every 6 hours as needed for nausea       potassium chloride ER (KLOR-CON M) 20 MEQ CR tablet Take 20 mEq by mouth daily       tamsulosin (FLOMAX) 0.4 MG capsule Take 1 capsule (0.4 mg) by mouth daily       tiotropium (SPIRIVA) 18 MCG inhaled capsule Inhale 18 mcg into the lungs daily       vancomycin (FIRVANQ) 50 MG/ML oral solution Take 2.5 mLs (125 mg) by mouth 4 times daily Takes 4 times daily x 10 days original start date 01/15. Continue oral taper once complete            Controlled medications:   not applicable/none     Past Medical History:   Past Medical History:   Diagnosis Date     Adenocarcinoma, lung, 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 Vasques      Aortic stenosis     Severe AS, 9/2015 AVR - [facility] TRIFECTA Bovine bioprosthesis 25MM TF-[id number]     Atrial fibrillation (H)     9/2015 Paroxysmal post op Afib - discharged on Warfarin and a beta blocker     COPD  "(chronic obstructive pulmonary disease) (H)      Deep vein thrombosis (H)      GERD (gastroesophageal reflux disease)      Heart murmur      Monoclonal gammopathy     plasmacyte prominent causing monoclonal gammopathy     Need for SBE (subacute bacterial endocarditis) prophylaxis      Neuropathy      Other and unspecified hyperlipidemia      Other malignant lymphomas     non hodgkin's lymphoma     RBBB (right bundle branch block)      Severe sepsis with acute organ dysfunction (H) 11/16/2015     Unspecified hereditary and idiopathic peripheral neuropathy      Physical Exam:   Vitals: /78   Pulse 96   Temp 98  F (36.7  C)   Resp 14   Ht 1.778 m (5' 10\")   Wt 62.6 kg (138 lb)   SpO2 94%   BMI 19.80 kg/m    BMI: Body mass index is 19.8 kg/m .  GENERAL APPEARANCE:  Alert, in no distress  ENT:  Mouth and posterior oropharynx normal, moist mucous membranes, [place]  EYES:  EOM, conjunctivae, lids, pupils and irises normal  RESP:  respiratory effort and palpation of chest normal, lungs clear to auscultation , no respiratory distress  CV:  Palpation and auscultation of heart done , regular rate and rhythm, no murmur, rub, or gallop, peripheral edema trace+ in LE bilaterally  ABDOMEN:  normal bowel sounds, soft, nontender, no hepatosplenomegaly or other masses  M/S:   patient resting in bed  SKIN:  Inspection of skin and subcutaneous tissue baseline  NEURO:   speech wnl  PSYCH:  affect and mood normal     SNF labs: Recent labs in [facility] reviewed by me today.  and Most Recent 3 CBC's:  Recent Labs   Lab Test 01/19/24  0836 01/16/24  0658 01/15/24  1855   WBC 6.2 9.3 10.7   HGB 9.2* 8.8* 11.0*   MCV 81 82 83   * 144* 164     Most Recent 3 BMP's:  Recent Labs   Lab Test 01/20/24  1045 01/20/24  0034 01/19/24  0836 01/16/24  0658 01/15/24  1855   NA  --   --  144 144 140   POTASSIUM 3.6 3.1* 3.1* 3.7 4.0   CHLORIDE  --   --  106 109* 100   CO2  --   --  30* 26 30*   BUN  --   --  12.7 21.0 18.3   CR  --   --  0.79 " 1.14 1.31*   ANIONGAP  --   --  8 9 10   AMRITA  --   --  8.4 8.9 9.6   GLC  --   --  106* 107* 155*     Assessment/Plan:  (A04.72) Colitis due to Clostridium difficile  (primary encounter diagnosis)  (R62.7) Failure to thrive in adult  (R53.81) Physical deconditioning  (E44.0) Moderate protein-calorie malnutrition (H24)  Comment: acute/ongoing  Family now planning on f/u with GI  Plan: discharge to home with home PT, OT, RN, HHA and SW through [facility], KAREEN report will be filed   vancomycin tapered down to 125mg QD will continue until f/u with GI  Patient is taking dietary supplements boost breeze and mighty shake        (R13.10) Dysphagia, unspecified type  Comment: acute/ongoing  Plan: continue dysphagia diet      (C34.91) Adenocarcinoma of right lung (H)  Comment: acute/ongoing  Plan[place] with [place] [facility] as [place] has appt on 2/12/24     (R22.0) Nodule of tongue  Comment: acute/ongoing  Plan: f/u with ENT as OP     (I10) Essential hypertension with goal blood pressure less than 140/90  (I48.0) Paroxysmal atrial fib -- on Eliquis   (N18.30) Stage 3 chronic kidney disease, unspecified whether stage 3a or 3b CKD (H)  Comment: acute/ongoing  Plan:  continue to follow off Rx, avoid nephrotoxic agents     (J44.0) Chronic obstructive pulmonary disease with acute lower respiratory infection (H)  Comment: ongoing  Plan: on chronic O2, continue spiriva 18mcg QD and albuterol MDI 2 puffs q 6 hours prn     (R41.89) Cognitive impairment  Comment: acute/ongoing  Plan: OT ongoing, ss to assist with discharge planning  MOCA 9/30 indicating severe impairmentt  Patient wife and Son planning to discharge to home  IDT recommend 24 hour care   KAREEN report filed     (D50.0) Iron deficiency anemia due to chronic blood loss  Hgb on 1/25/24 9.0 and 2/1/24 Hgb 9.1  Comment: acute/ongoing  Plan: f/u with PCP    (H92.02) left ear pain  Acute started c/o pain 1/12/24 in afternoon  Plan: avoid oral Abx d/t C-diff  Start ofloxacin otic 0.3% 10 gtts  BID for 7 days, f/u with PCP    DISCHARGE PLAN:  Follow up labs: No labs orders/due  Medical Follow Up:      Follow up with primary care provider in 1-2 weeks  [facility] scheduled appointments:  Appointments in Next Year      Feb 13, 2024 11:30 AM  Discharge Summary with Tonya Lynn Haase, APRN CNP  [facility] ([facility] ) [phone] Feb 22, 2024  3:00 PM  (Arrive by 2:40 PM)  Provider Visit with Karson Bishop MD  [facility] [phone]           Discharge Services: Home Care:  Occupational Therapy, Physical Therapy, Registered Nurse, Home Health Aide, and   Discharge Instructions Verbalized to Patient at Discharge:   None    TOTAL DISCHARGE TIME:   Greater than 30 minutes  Electronically signed by:  Tonya Lynn Haase, APRN CNP                    Sincerely,        Tonya Lynn Haase, APRN CNP

## 2024-02-13 NOTE — PROGRESS NOTES
[facility] GERIATRICS DISCHARGE SUMMARY  PATIENT'S NAME: Hermilo Velasquez  YOB: 1932  MEDICAL RECORD NUMBER:  [id number]  Place of Service where encounter took place:  [facility]) [25]    PRIMARY CARE PROVIDER AND CLINIC RESPONSIBLE AFTER TRANSFER:   Karson Bishop MD, [address] / [place] [address]    [facility] Provider     Transferring providers: Tonya Lynn Haase, APRN CNP, Charles You MD  Recent Hospitalization/ED:  [facility] stay 1/15/24 to 1/21/24.  Date of SNF Admission: January 21, 2024  Date of SNF (anticipated) Discharge: February 14, 2024  Discharged to: previous independent home  Cognitive Scores: BIMS: 13/15 and Short blessed: 14/28  Physical Function: Ambulating 190 ft with RW with CGA  DME: Walker    CODE STATUS/ADVANCE DIRECTIVES DISCUSSION:  Prior   ALLERGIES: Omeprazole, Pantoprazole, Prevacid [lansoprazole], Lasix [furosemide], Lidocaine, Penicillin g, and Penicillins    NURSING FACILITY COURSE   Medication Changes/Rationale:   See assessment and plan    Summary of nursing facility stay:   Patient progressed to walking up to 190 feet using a RW with CGA, requires cues for safety, very [place], cognitive impairement with a MOCA of 9/30 indicating severe cognitive impairement, requires assistance with ADL's and toileting. Therapy do not feel comfortable training wife for caregiver training, therapy states patient's wife has very poor balance. Patient and family (wife and Son) have decided to take patient home, where he lives with wife. Therapy and team here at [facility] are recommending 24 hour supervision and assistance. Family has declined. Patient will discharge to home with home PT, OT, RN, HHA  and SW through [facility]. A KAREEN report will be filed d/t concerns for unsafe discharge plan    Discharge Medications:  MED REC REQUIRED  Post Medication Reconciliation Status: discharge medications reconciled and changed, per  note/orders       Current Outpatient Medications   Medication Sig Dispense Refill    acetaminophen (TYLENOL) 500 MG tablet Take 1,000 mg by mouth 3 times daily as needed      albuterol (PROAIR HFA/PROVENTIL HFA/VENTOLIN HFA) 108 (90 Base) MCG/ACT inhaler Inhale 2 puffs into the lungs every 6 hours as needed      atorvastatin (LIPITOR) 10 MG tablet Take 1 tablet (10 mg) by mouth daily 90 tablet 0    DULoxetine (CYMBALTA) 60 MG capsule Take 60 mg by mouth daily      famotidine (PEPCID) 40 MG tablet Take 1 tablet (40 mg) by mouth 2 times daily 60 tablet 0    ferrous sulfate (FE TABS) 325 (65 Fe) MG EC tablet Take 1 tablet (325 mg) by mouth 2 times daily      fludrocortisone (FLORINEF) 0.1 MG tablet Take 1 tablet (0.1 mg) by mouth daily 90 tablet 3    gabapentin (NEURONTIN) 300 MG capsule Take 2 capsules (600 mg) by mouth At Bedtime For neuropathy pain      ondansetron (ZOFRAN ODT) 4 MG ODT tab Take 1 tablet (4 mg) by mouth every 6 hours as needed for nausea      potassium chloride ER (KLOR-CON M) 20 MEQ CR tablet Take 20 mEq by mouth daily      tamsulosin (FLOMAX) 0.4 MG capsule Take 1 capsule (0.4 mg) by mouth daily      tiotropium (SPIRIVA) 18 MCG inhaled capsule Inhale 18 mcg into the lungs daily      vancomycin (FIRVANQ) 50 MG/ML oral solution Take 2.5 mLs (125 mg) by mouth 4 times daily Takes 4 times daily x 10 days original start date 01/15. 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 Vasques     Aortic stenosis     Severe AS, 9/2015 AVR - [facility] TRIFECTA Bovine bioprosthesis 25MM TF-25A    Atrial fibrillation (H)     9/2015 Paroxysmal post op Afib - discharged on Warfarin and a beta blocker    COPD (chronic obstructive pulmonary disease) (H)     Deep vein thrombosis (H)     GERD (gastroesophageal reflux disease)      "Heart murmur     Monoclonal gammopathy     plasmacyte prominent causing monoclonal gammopathy    Need for SBE (subacute bacterial endocarditis) prophylaxis     Neuropathy     Other and unspecified hyperlipidemia     Other malignant lymphomas     non hodgkin's lymphoma    RBBB (right bundle branch block)     Severe sepsis with acute organ dysfunction (H) 11/16/2015    Unspecified hereditary and idiopathic peripheral neuropathy      Physical Exam:   Vitals: /78   Pulse 96   Temp 98  F (36.7  C)   Resp 14   Ht 1.778 m (5' 10\")   Wt 62.6 kg (138 lb)   SpO2 94%   BMI 19.80 kg/m    BMI: Body mass index is 19.8 kg/m .  GENERAL APPEARANCE:  Alert, in no distress  ENT:  Mouth and posterior oropharynx normal, moist mucous membranes, [place]  EYES:  EOM, conjunctivae, lids, pupils and irises normal  RESP:  respiratory effort and palpation of chest normal, lungs clear to auscultation , no respiratory distress  CV:  Palpation and auscultation of heart done , regular rate and rhythm, no murmur, rub, or gallop, peripheral edema trace+ in LE bilaterally  ABDOMEN:  normal bowel sounds, soft, nontender, no hepatosplenomegaly or other masses  M/S:   patient resting in bed  SKIN:  Inspection of skin and subcutaneous tissue baseline  NEURO:   speech wnl  PSYCH:  affect and mood normal     SNF labs: Recent labs in [facility] reviewed by me today.  and Most Recent 3 CBC's:  Recent Labs   Lab Test 01/19/24  0836 01/16/24  0658 01/15/24  1855   WBC 6.2 9.3 10.7   HGB 9.2* 8.8* 11.0*   MCV 81 82 83   * 144* 164     Most Recent 3 BMP's:  Recent Labs   Lab Test 01/20/24  1045 01/20/24  0034 01/19/24  0836 01/16/24  0658 01/15/24  1855   NA  --   --  144 144 140   POTASSIUM 3.6 3.1* 3.1* 3.7 4.0   CHLORIDE  --   --  106 109* 100   CO2  --   --  30* 26 30*   BUN  --   --  12.7 21.0 18.3   CR  --   --  0.79 1.14 1.31*   ANIONGAP  --   --  8 9 10   AMRITA  --   --  8.4 8.9 9.6   GLC  --   --  106* 107* 155*     Assessment/Plan:  (A04.72) " Colitis due to Clostridium difficile  (primary encounter diagnosis)  (R62.7) Failure to thrive in adult  (R53.81) Physical deconditioning  (E44.0) Moderate protein-calorie malnutrition (H24)  Comment: acute/ongoing  Family now planning on f/u with GI  Plan: discharge to home with home PT, OT, RN, HHA and SW through [facility], KAREEN report will be filed   vancomycin tapered down to 125mg QD will continue until f/u with GI  Patient is taking dietary supplements boost breeze and mighty shake        (R13.10) Dysphagia, unspecified type  Comment: acute/ongoing  Plan: continue dysphagia diet      (C34.91) Adenocarcinoma of right lung (H)  Comment: acute/ongoing  Plan: f/u with [place] oncology as OP has appt on 2/12/24     (R22.0) Nodule of tongue  Comment: acute/ongoing  Plan: f/u with ENT as OP     (I10) Essential hypertension with goal blood pressure less than 140/90  (I48.0) Paroxysmal atrial fib -- on Eliquis   (N18.30) Stage 3 chronic kidney disease, unspecified whether stage 3a or 3b CKD (H)  Comment: acute/ongoing  Plan:  continue to follow off Rx, avoid nephrotoxic agents     (J44.0) Chronic obstructive pulmonary disease with acute lower respiratory infection (H)  Comment: ongoing  Plan: on chronic O2, continue spiriva 18mcg QD and albuterol MDI 2 puffs q 6 hours prn     (R41.89) Cognitive impairment  Comment: acute/ongoing  Plan: OT ongoing, ss to assist with discharge planning  MOCA 9/30 indicating severe impairmentt  Patient wife and Son planning to discharge to home  IDT recommend 24 hour care   KAREEN report filed     (D50.0) Iron deficiency anemia due to chronic blood loss  Hgb on 1/25/24 9.0 and 2/1/24 Hgb 9.1  Comment: acute/ongoing  Plan: f/u with PCP    (H92.02) left ear pain  Acute started c/o pain 1/12/24 in afternoon  Plan: avoid oral Abx d/t C-diff  Start ofloxacin otic 0.3% 10 gtts BID for 7 days, f/u with PCP    DISCHARGE PLAN:  Follow up labs: No labs orders/due  Medical Follow Up:      Follow up with primary  care provider in 1-2 weeks  [facility] scheduled appointments:  Appointments in Next Year      Feb 13, 2024 11:30 AM  Discharge Summary with Tonya Lynn Haase, APRN CNP  [facility] ([facility] ) [phone] Feb 22, 2024  3:00 PM  (Arrive by 2:40 PM)  Provider Visit with Karson Bishop MD  [facility] ([facility] ) [phone]           Discharge Services: Home Care:  Occupational Therapy, Physical Therapy, Registered Nurse, Home Health Aide, and   Discharge Instructions Verbalized to Patient at Discharge:   None    TOTAL DISCHARGE TIME:   Greater than 30 minutes  Electronically signed by:  Tonya Lynn Haase, APRN CNP

## 2024-02-15 PROBLEM — R09.02 HYPOXIA: Status: RESOLVED | Noted: 2022-11-06 | Resolved: 2024-01-01

## 2024-02-15 PROBLEM — R53.1 WEAKNESS: Status: RESOLVED | Noted: 2024-01-01 | Resolved: 2024-01-01

## 2024-02-15 PROBLEM — R65.20 SEVERE SEPSIS (H): Status: RESOLVED | Noted: 2022-11-06 | Resolved: 2024-01-01

## 2024-02-15 PROBLEM — R53.1 GENERALIZED WEAKNESS: Status: RESOLVED | Noted: 2023-01-01 | Resolved: 2024-01-01

## 2024-02-15 PROBLEM — A41.9 SEVERE SEPSIS (H): Status: RESOLVED | Noted: 2022-11-06 | Resolved: 2024-01-01

## 2024-02-15 PROBLEM — A41.9 SEPSIS (H): Status: RESOLVED | Noted: 2022-11-06 | Resolved: 2024-01-01

## 2024-02-15 PROBLEM — R41.89 COGNITIVE IMPAIRMENT: Status: ACTIVE | Noted: 2024-01-01

## 2024-02-15 PROBLEM — K52.9 COLITIS: Status: RESOLVED | Noted: 2023-01-01 | Resolved: 2024-01-01

## 2024-02-15 PROBLEM — J18.9 PNEUMONIA OF LOWER LOBE DUE TO INFECTIOUS ORGANISM, UNSPECIFIED LATERALITY: Status: RESOLVED | Noted: 2022-10-09 | Resolved: 2024-01-01

## 2024-02-15 PROBLEM — R50.9 FEVER, UNSPECIFIED FEVER CAUSE: Status: RESOLVED | Noted: 2023-01-01 | Resolved: 2024-01-01

## 2024-02-15 PROBLEM — R55 SYNCOPE: Status: RESOLVED | Noted: 2024-01-01 | Resolved: 2024-01-01

## 2024-02-15 PROBLEM — R91.1 NODULE OF LOWER LOBE OF RIGHT LUNG: Status: RESOLVED | Noted: 2019-03-26 | Resolved: 2024-01-01

## 2024-02-15 PROBLEM — J18.9 PNEUMONIA DUE TO INFECTIOUS ORGANISM, UNSPECIFIED LATERALITY, UNSPECIFIED PART OF LUNG: Status: RESOLVED | Noted: 2023-01-01 | Resolved: 2024-01-01

## 2024-02-15 PROBLEM — E86.0 DEHYDRATION: Status: RESOLVED | Noted: 2023-01-01 | Resolved: 2024-01-01

## 2024-02-15 PROBLEM — R19.7 DIARRHEA, UNSPECIFIED TYPE: Status: RESOLVED | Noted: 2024-01-01 | Resolved: 2024-01-01

## 2024-02-15 PROBLEM — M25.00 HEMARTHROSIS: Status: RESOLVED | Noted: 2022-05-08 | Resolved: 2024-01-01

## 2024-02-15 PROBLEM — Z85.72 HISTORY OF NON-HODGKIN'S LYMPHOMA: Status: RESOLVED | Noted: 2020-08-15 | Resolved: 2024-01-01

## 2024-02-15 PROBLEM — D50.9 ANEMIA, IRON DEFICIENCY: Status: RESOLVED | Noted: 2020-09-11 | Resolved: 2024-01-01

## 2024-02-15 PROBLEM — N17.9 ACUTE KIDNEY INJURY (H): Status: RESOLVED | Noted: 2022-11-06 | Resolved: 2024-01-01

## 2024-02-15 PROBLEM — R41.82 ALTERED MENTAL STATUS, UNSPECIFIED ALTERED MENTAL STATUS TYPE: Status: RESOLVED | Noted: 2022-11-06 | Resolved: 2024-01-01

## 2024-02-15 PROBLEM — J18.9 PNEUMONIA OF LEFT LOWER LOBE DUE TO INFECTIOUS ORGANISM: Status: RESOLVED | Noted: 2021-04-13 | Resolved: 2024-01-01

## 2024-02-15 PROBLEM — D64.9 ANEMIA, UNSPECIFIED TYPE: Status: RESOLVED | Noted: 2020-10-23 | Resolved: 2024-01-01

## 2024-02-15 PROBLEM — G93.40 ENCEPHALOPATHY: Status: RESOLVED | Noted: 2022-10-09 | Resolved: 2024-01-01

## 2024-02-15 PROBLEM — K14.8 MASS OF TONGUE: Status: ACTIVE | Noted: 2024-01-01

## 2024-02-15 PROBLEM — I48.91 ATRIAL FIBRILLATION WITH RAPID VENTRICULAR RESPONSE (H): Status: RESOLVED | Noted: 2023-01-01 | Resolved: 2024-01-01

## 2024-02-15 NOTE — PROGRESS NOTES
Hermilo is a 89 year old who is being evaluated via a billable telephone visit.      What phone number would you like to be contacted at? [phone]  How would you like to obtain your AVS? Mail a copy    Distant Location (provider location):  On-site    Assessment & Plan     C. difficile colitis  Failure to thrive in adult  Malignant neoplasm of lung, unspecified laterality, unspecified part of lung (H)  Mass of tongue  Cognitive impairment    I spoke with his wife as he was sleeping  She wants to obtain a new oxygen tank for him  She is feeling stressed about taking Chapito to many upcoming appointments and providing care for him at home, but she did not seem open to consider the overall goals of his care this afternoon  She intends to take him for a lung biopsy and to ENT to sample the tissue at the base of his tongue  She states his diarrhea is better    I have an appointment to see him next week in person  I will ask care coordination to help with the home O2 supplies    I need to have more conversations about goals of care when they are ready to discuss further.  The overall prognosis is very guarded and the expectation that additional cancer treatments could lead to additional quality of life is becoming less and less realistic    No LOS data to display   Time spent by me doing chart review, history and exam, documentation and further activities per the note    MED REC REQUIRED  Post Medication Reconciliation Status: discharge medications reconciled and changed, per note/orders    FUTURE APPOINTMENTS:       - 2/22/24 or sooner as needed     Subjective   Hermilo is a 89 year old, presenting for the following health issues:  Hospital F/U (Patient is having a virtual video follow up for fall and C.diff.  Spouse has concerns of being on too many medications.)    HPI     Hospitalized for C. Diff and failure to thrive  Discharged to TCU  Cognitive impairment diagnosed           Objective           Vitals:  No vitals  were obtained today due to virtual visit.    Physical Exam   I was unable to speak with Chapito since he was sleeping, I spoke with his wife             Phone call duration: 7 minutes  Signed Electronically by: Karson Bishop MD

## 2024-02-16 NOTE — LETTER
[facility] CARE COORDINATION  [address] [facility] [address]  [place] [address]    February 16, 2024    Hermilo Velasquez  [address]  [place] [address]      Dear Hermilo,    I am a clinic care coordinator who works with Karson Bishop MD with the [facility]. I wanted to thank you for spending the time to talk with me.  Below is a description of clinic care coordination and how I can further assist you.       The clinic care coordination team is made up of a registered nurse, , financial resource worker and community health worker who understand the health care system. The goal of clinic care coordination is to help you manage your health and improve access to the health care system. Our team works alongside your provider to assist you in determining your health and social needs. We can help you obtain health care and community resources, providing you with necessary information and education. We can work with you through any barriers and develop a care plan that helps coordinate and strengthen the communication between you and your care team.  Our services are voluntary and are offered without charge to you personally.    Please feel free to contact me with any questions or concerns regarding care coordination and what we can offer.      We are focused on providing you with the highest-quality healthcare experience possible.    Sincerely,     Louisa Shelley, [facility]  Clinic Care Coordinator  [facility]  [phone]    Enclosed:

## 2024-02-16 NOTE — TELEPHONE ENCOUNTER
"Pt's wife Roberta (C2C) called the clinic asking for an update regarding this pt's new oxygen machine.   Per VV note from yesterday, PCP stated, \"I will ask care coordination to help with the home O2 supplies.\"    Routing to CC team to assist and reach out to Roberta.    Can we leave a detailed message on this number? NO  Phone number patient can be reached at: Cell number on file:    Telephone Information:   Mobile [phone]     Mirian Perez RN  [facility] Triage    "

## 2024-02-16 NOTE — PROGRESS NOTES
Clinic Care Coordination Contact  [facility]: Post-Discharge Note  SITUATION                                                      Admission:    Admission Date: 01/15/24   Reason for Admission: H/o recurrent c diff colitis  Diarrhea  Discharge:   Discharge Date: 01/22/24  Discharge Diagnosis: H/o recurrent c diff colitis  Diarrhea    BACKGROUND                                                          Spouse states that she does not think that the oxygen tank she has for pt is adequate.  She is hoping to get a new oxygen tank.  SW reached out to [facility] homecare liaison and they can assist with ordering a new tank, but cannot so so until they are out and meeting with pt next Tuesday.  SW relayed this information to wife.  She understands and said she thinks that will be fine.        Per hospital discharge summary and inpatient provider notes:  Failure to thrive  Goals of care  Just discharged 01/11 home after refusing TCU. Admitted that time for anemia on AC for acute PE.   *Overall this is patient's 5th admission since 08/2023 with continued ongoing decline at home. TCU recommended on every discharge but patient has refused most times     - Active PE dx 10/2023 not fully treated given GI bleed  - admitted this time as patient wife reported inability to care for him at home  - Palliative care evaluated the patient and wife wants restorative care including full code.     ASSESSMENT           Discharge Assessment  How are you doing now that you are home?: He isn't having a great day.  Yesterday was better.  How are your symptoms? (Red Flag symptoms escalate to triage hotline per guidelines): Unchanged  Do you feel your condition is stable enough to be safe at home until your provider visit?: Yes  Does the patient have their discharge instructions? : Yes  Does the patient have questions regarding their discharge instructions? : No  Does the patient have all of their medications?: Yes  Do you have questions regarding  any of your medications? : No  Do you have all of your needed medical supplies or equipment (DME)?  (i.e. oxygen tank, CPAP, cane, etc.): Yes  Discharge follow-up appointment scheduled within 14 calendar days? : Yes  Discharge Follow Up Appointment Date: 02/22/24  Discharge Follow Up Appointment Scheduled with?: Primary Care Provider         Post-op (Clinicians Only)  Did the patient have surgery or a procedure: No  Fever: No  PO Intake: regular diet  Bowel Function: loose stools  Urinary Status: voiding without complaint/concerns        PLAN                                                      Outpatient Plan:  Spouse states that she is tired and pt is not sleeping much at night.  She said that their son is helping a lot.        Future Appointments   Date Time Provider Department Center   2/22/2024  3:00 PM Karson Bishop MD [facility] CS         For any urgent concerns, please contact our 24 hour nurse triage line: [phone] ([phone])         BERNICE Schmidt

## 2024-02-19 NOTE — TELEPHONE ENCOUNTER
Writer called and left detailed message on confidential VM for CECILIA Li with [facility] notifying of PCP's approval for requested home care orders.    Advised that home care callback if anything further is needed.    Signing encounter.    Luis Mccabe RN  [facility]

## 2024-02-19 NOTE — TELEPHONE ENCOUNTER
Home Care is calling regarding an established patient with [facility].        1/12/2024     4:27 PM   Home Care Information   Date of Home Care episode start 1/16/2024   Current following provider Karson Bishop    Name/Phone Number JaycobCECILIA pop #[phone]   Home Care agency [facility]     Requesting orders from: Karson Bishop    Orders Requested    Skilled Nursing  1 x /wk for 3 wks  EOW for 5 wks      Physical Therapy  Request for initial evaluation and treatment (one time) (first set of orders)     Occupational Therapy  Request for initial evaluation and treatment (one time) (first set of orders)     Social Work  One visit    HHA (Home Health Aide)  1 x wk for 4w ks    FYI- pt was discharged with orders for potassium on 02/14. Wife plans to  rx today. Pt has not been taking potassium rx. Pt has future appt scheduled.     Future Appointments 2/19/2024 - 8/17/2024        Date Visit Type Length Department Provider     2/22/2024  3:00 PM OFFICE VISIT 30 min CS FAMILY PRAC/IM Karson Bishop MD    Location Instructions:     [facility] is in [address] of the [facility] at [address] This is just south of [facility] and the [facility] exit off of [address] Free parking is available; access the lot by turning east from [facility] onto [address] [place] [address]. Through the main entrance, the clinic is directly to the left.                       Confirmed ok to leave a detailed message with call back.  Contact information confirmed and updated as needed.    Shankar Brady RN

## 2024-02-20 NOTE — PROGRESS NOTES
RECEIVING UNIT ED HANDOFF REVIEW    ED Nurse Handoff Report was reviewed by: Nawaf Serra RN on February 20, 2024 at 1:08 AM

## 2024-02-20 NOTE — PROGRESS NOTES
[facility]  Inpatient Clinical Swallow Evaluation    02/20/24 1453   Appointment Info   Signing Clinician's Name / Credentials (SLP) Selena Miles MS CCC SLP   General Information   Onset of Illness/Injury or Date of Surgery 02/19/24   Referring Physician Charlie Muñiz DO   Patient/Family Therapy Goal Statement (SLP) None stated   Pertinent History of Current Problem Per provider documentation - Hermilo Velasquez is a 89 year old male with a history of lung cancer s/p wedge resection, recurrent c.diff, afib, COPD, PE, aortic stenosis s/p bioprosthetic heart valve, PATIENCE, CKD who is admitted on 2/19/2024 with worsening cough and increasing O2 needs with CT showing bilateral pneumonia. FDG avid soft tissue nodule in the right tongue base  Concern for squamous cell carcinoma.   General Observations Pt is alert, appears confused, no family present. Agreed to only a small amount of PO trials   Type of Evaluation   Type of Evaluation Swallow Evaluation   Oral Motor   Oral Musculature generally intact   Structural Abnormalities none present   Mucosal Quality good   Dentition (Oral Motor)   Dentition (Oral Motor) dental appliance/dentures   Dental Appliance/Denture (Oral Motor) upper;poor fit  (missing lower teeth)   Vocal Quality/Secretion Management (Oral Motor)   Vocal Quality (Oral Motor) hoarse   Secretion Management (Oral Motor) WNL   General Swallowing Observations   Current Diet/Method of Nutritional Intake (General Swallowing Observations, NIS) soft and bite-sized (dysphagia advanced) (level 6);thin liquids (level 0)   Respiratory Support oxygen mask   Past History of Dysphagia Pt is well known to SLP dept. He was discharged in Jan of this year with recommendations as follows - Recommend a soft and bite sized diet (IDDSI 6) with small sips of thin liquid (no straws). Pt should sit upright, take one small sip at a time & avoid straws. Caregivers can cue for small sip with chin tuck, pt unable to  recall independently. His last video swallow was 10/11/22.   Swallowing Evaluation Clinical swallow evaluation   Clinical Swallow Evaluation   Feeding Assistance frequent cues/help required   Clinical Swallow Evaluation Textures Trialed thin liquids;pureed;solid foods   Clinical Swallow Eval: Thin Liquid Texture Trial   Mode of Presentation, Thin Liquids cup;self-fed   Volume of Liquid or Food Presented 2 oz   Oral Phase of Swallow premature pharyngeal entry   Pharyngeal Phase of Swallow repeated swallows   Diagnostic Statement No overt s/sx of aspiration immediately after the swallow, repeated swallows per bolus. Inconsistent use of chin tuck manuever. Delayed cough noted between trials, unclear if related to aspiration   Clinical Swallow Evaluation: Puree Solid Texture Trial   Diagnostic Statement Pt declined   Clinical Swallow Evaluation: Solid Food Texture Trial   Mode of Presentation self-fed   Volume Presented 1/4 cracker   Oral Phase impaired mastication  (ill fitting dentures)   Pharyngeal Phase intact   Diagnostic Statement Impaired mastication d/t ill fitting dentures. Mild oral residue, limited trials.   Swallowing Recommendations   Diet Consistency Recommendations soft & bite-sized (level 6);thin liquids (level 0)   Supervision Level for Intake 1:1 supervision needed   Mode of Delivery Recommendations bolus size, small;no straws;food moistened;slow rate of intake   Postural Recommendations chin tuck  (Remind pt to use chin tuck with liquids)   Swallowing Maneuver Recommendations alternate food and liquid intake   Monitoring/Assistance Required (Eating/Swallowing) check mouth frequently for oral residue/pocketing;stop eating activities when fatigue is present;monitor for cough or change in vocal quality with intake   Recommended Feeding/Eating Techniques (Swallow Eval) maintain upright sitting position for eating;maintain upright posture during/after eating for 30 minutes;minimize distractions during oral  intake;provide assist with feeding   Medication Administration Recommendations, Swallowing (SLP) As tolerated   Instrumental Assessment Recommendations VFSS (videofluoroscopic swallowing study)   General Therapy Interventions   Planned Therapy Interventions Dysphagia Treatment   Clinical Impression   Criteria for Skilled Therapeutic Interventions Met (SLP Eval) Yes, treatment indicated   SLP Diagnosis Oropharyngeal dysphagia   Risks & Benefits of therapy have been explained evaluation/treatment results reviewed;care plan/treatment goals reviewed;participants included;patient   Clinical Impression Comments Pt seen for clinical swallow evaluation. It was limited as pt agreed to only small amounts of PO. No immediate, overt s/sx of aspiration across trials. Intermittent congested cough between trials, unclear if concern for aspiration or secondary to pna. Ill fitting dentures results in impaired mastication and mild oral residue. Pt also appearing confused t/o the session. A chin tuck has been encouraged in the past but suspect poor implementation of this.     Oropharyngeal dysphagia, acute on chronic. Continue soft and bite sized diet and thin liquids, remind pt to use chin tuck with thin liquids. Upright position, small bites/sips, slow rate of intake. Initiate SLP services for dysphagia. Recommend a VFSS as his previous one was completed in 2022, pt is inconsistently using the chin tuck manuever, and admitted with pna.   SLP Total Evaluation Time   Eval: oral/pharyngeal swallow function, clinical swallow Minutes ([id number]) 9

## 2024-02-20 NOTE — CONSULTS
"[facility] Nurse Inpatient Assessment     Consulted for: Wound buttock     Summary: patient with present on a previous admission buttock wound, improved based on prior documentation last admission, stable on initial exam 2/20    Patient History (according to provider note(s):      \"89 year old male with a history of lung cancer s/p wedge resection, recurrent c.diff, afib, COPD, PE, aortic stenosis s/p bioprosthetic heart valve, PATIENCE, CKD who is admitted on 2/19/2024 with worsening cough and increasing O2 needs with CT showing bilateral pneumonia. \"    Assessment:      Areas visualized during today's visit: Focused: and Sacrum/coccyx    Wound location: buttock sacrococcygeal     Last photo: 2/20  Wound due to: Pressure Injury and Moisture Associated Skin Damage (MASD) POA   Wound history/plan of care: mepilex   Wound base: blanchable , epidermis, dermis, and serous scabbing,      Palpation of the wound bed: normal      Drainage: scant     Description of drainage: serous     Measurements (length x width x depth, in cm): 4  x 0.4  x  0.1 cm      Tunneling: N/A     Undermining: N/A  Periwound skin: Intact and Erythema- blanchable      Color: pink      Temperature: normal   Odor: none  Pain: denies , none  Pain interventions prior to dressing change: no significant pain present   Treatment goal: Heal  and Protection  STATUS: initial assessment  Supplies ordered: supplies stored on unit       Treatment Plan:       Wound care  Start:  02/21/24 0600,   DAILY,   Routine        Comments: Location: buttock  Care: primary RN  1. Cleanse under mepilex daily with normal saline and 4x4\" gauze, pat dry  2. Reapply same mepilex sacral size, ok to use each up to 5 days, ok to lift for routine assessments  3. Continue to use irlanda cleanser to perianal with soft dry wipes every 2 hours with routine turns          Orders: Written    RECOMMEND PRIMARY TEAM ORDER: None, at this time  Education provided: plan " of care  Discussed plan of care with: Patient and Nurse  [facility] nurse follow-up plan: weekly  Notify [facility] if wound(s) deteriorate.  Nursing to notify the Provider(s) and re-consult the [facility] Nurse if new skin concern.    DATA:     Current support surface: Standard  Low air loss (ADILENE pump, Isolibrium, Pulsate, skin guard, etc)  Containment of urine/stool: Incontinence Protocol, Incontinent pad in bed, and Primofit external catheter  BMI: There is no height or weight on file to calculate BMI.   Active diet order: Orders Placed This Encounter      Combination Diet Regular Diet Adult; Soft and Bite Sized Diet (level 6); Thin Liquids (level 0)     Output: I/O last 3 completed shifts:  In: 1350 [IV Piggyback:1350]  Out: -      Labs:   Recent Labs   Lab 02/20/24  0651 02/19/24  2105 02/19/24  2054   ALBUMIN  --   --  3.2*   HGB 9.3*  --  10.0*   INR  --  1.37*  --    WBC 21.3*  --  17.1*     Pressure injury risk assessment:   Sensory Perception: 2-->very limited  Moisture: 3-->occasionally moist  Activity: 2-->chairfast  Mobility: 2-->very limited  Nutrition: 2-->probably inadequate  Friction and Shear: 2-->potential problem  Jamin Score: 13    Arianna CWOCN   1st choice: Securely message with [employer] ([facility] [employer])   (2nd option: [facility] Office Phone [phone], messages checked periodically Mon-Fri 8a-4p)

## 2024-02-20 NOTE — PROGRESS NOTES
Pt transferred to Ortho Spine rm. 2407  VSS on 3L NC. LS course/diminished. Congested cough. Tele: Afib CVR. Denies pain/nausea. Soft/bite sized diet - total feed, sit upright, no straws. SLP following. PI to coccyx, mepilex in place - WOC following. R flank/back & L arm skin tear with foam dressing in place. Redness to scrotum. Baseline neuropathy in bilateral LE. External cath w/ AUOP, x 1 small black BM. Thoracentesis completed. T/R q2h, up with lift. PIV x 2 intermittent antibiotics.

## 2024-02-20 NOTE — ED NOTES
DATE/TIME OF CALL RECEIVED FROM LAB:  02/19/24 at 10:29 PM   LAB TEST:  troponin, procalcitonin  LAB VALUE:  107, 29  PROVIDER NOTIFIED?: Yes  PROVIDER NAME: Noe  DATE/TIME LAB VALUE REPORTED TO PROVIDER: 2223  MECHANISM OF PROVIDER NOTIFICATION: Face-To-Face  PROVIDER RESPONSE: acknowledged

## 2024-02-20 NOTE — PHARMACY-VANCOMYCIN DOSING SERVICE
Pharmacy Vancomycin Initial Note  Date of Service 2024  Patient's  11/3/1932  89 year old, male    Indication: Healthcare-Associated Pneumonia    Current estimated CrCl = Estimated Creatinine Clearance: 31.3 mL/min (A) (based on SCr of 1.36 mg/dL (H)).    Creatinine for last 3 days  2024:  8:54 PM Creatinine 1.51 mg/dL  2024:  6:51 AM Creatinine 1.36 mg/dL    Recent Vancomycin Level(s) for last 3 days  No results found for requested labs within last 3 days.      Vancomycin IV Administrations (past 72 hours)                     vancomycin (VANCOCIN) 1,500 mg in 0.9% NaCl 250 mL intermittent infusion (mg) 1,500 mg New Bag 24 223                    Nephrotoxins and other renal medications (From now, onward)      Start     Dose/Rate Route Frequency Ordered Stop    24 2200  vancomycin (VANCOCIN) 750 mg in sodium chloride 0.9 % 250 mL intermittent infusion         750 mg  over 60 Minutes Intravenous EVERY 24 HOURS 24 0745      24 0800  vancomycin (FIRVANQ) oral solution 125 mg        Note to Pharmacy: PTA Sig:Take 2.5 mLs (125 mg) by mouth daily Takes 4 times daily x 10 days original start date 01/15. Continue oral taper once complete      125 mg Oral EVERY 6 HOURS 24 0140              Contrast Orders - past 72 hours (72h ago, onward)      Start     Dose/Rate Route Frequency Stop    24 2210  iopamidol (ISOVUE-370) solution 70 mL         70 mL Intravenous ONCE 24 2223            [employer] Prediction of Planned Initial Vancomycin Regimen  Regimen: 750 mg IV every 24 hours.  Start time: 22:00 on 2024  Exposure target: AUC24 (range)400-600 mg/L.hr   AUC24,ss: 481 mg/L.hr  Probability of AUC24 > 400: 71 %  Ctrough,ss: 15.8 mg/L  Probability of Ctrough,ss > 20: 28 %  Probability of nephrotoxicity (Lodise ZIYAD ): 11 %          Plan:  Start vancomycin  750 mg IV q24h.   Vancomycin monitoring method: AUC  Vancomycin therapeutic monitoring goal: 400-600  mg*h/L  Pharmacy will check vancomycin levels as appropriate in 1-3 Days.    Serum creatinine levels will be ordered daily for the first week of therapy and at least twice weekly for subsequent weeks.      Evie Keane RPH , PharmD, BCPS

## 2024-02-20 NOTE — PROVIDER NOTIFICATION
"MD Notification    Notified Person: MD    Notified Person Name: Charlie uMñiz    Notification Date/Time: 2/20/24 0230    Notification Interaction: \"Just admitted pt from ED, see order for contact for recurrent c. diff. Should we go enteric?\"    Purpose of Notification: Order clarification    Orders Received: Enteric precautions    Comments:    "

## 2024-02-20 NOTE — H&P
[facility]    History and Physical - Hospitalist Service       Date of Admission:  2/19/2024     Assessment & Plan      Hermilo Velasquez is a 89 year old male with a history of lung cancer s/p wedge resection, recurrent c.diff, afib, COPD, PE, aortic stenosis s/p bioprosthetic heart valve, PATIENCE, CKD who is admitted on 2/19/2024 with worsening cough and increasing O2 needs with CT showing bilateral pneumonia.     Sepsis due to bilateral pneumonia  Loculated effusion of RLL, likely parapneumonic  CT shows increased consolidation of the RLL and new patchy infiltrates in the LLL concerning for progressive bilateral pneumonia. Also noted is loculated effusion on the right, progressed from prior. With increased O2 needs, new leukocytosis, procal of 21 and fever this is likely infectious source. Note history of dysphagia so aspiration pneumonia is a possibility though patient notes stability in his swallowing/eating.   -continue IV vanco and cefepime as started in ED  -wean O2 as able  -continuous pulse oximetry  -consider thoracentesis    Recurrent c diff colitis  diarrhea reported on January 14 prior to last admission admission. Previously recommended that the patient follow up at [facility] for fecal transplantation, but he has not. ID note from 11/28/23 had recommended a prolonged taper of vancomycin. Previously he had been recommended to continue po vancomycin indefinitely until fecal transplant. *oral vancomycin was discontinued during his last admission in January due to lack of diarrhea. Vanco restarted during last admission.   -will continue vanco at treatment doses as he will be started on broad spectrum antibiotics for pneumonia  -will need to continue with plans for taper and ongoing vanco use until he can follow up with [facility] for fecal transplant.     Mild encephalopathy due to sepsis  Probable Mild Dementia with occasional agitation  Unclear how far off baseline, note he has  similar issues during his last hospital stay with negative head CT and brain MRI. Head CT in the ED on 2/19 negative. Also noted to have significant cognitive deficits and memory issues per previous hospital stay.   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 Planned for pet scan and biopsy of nodule on 1/9 was cancelled by the patient  - Outpatient follow-up with oncology.     FDG avid soft tissue nodule in the right tongue base  Concern for squamous cell carcinoma.   - Appointment with ENT on 1/11 was cancelled to work up the lung first.  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     COPD   Chronic hypoxic respiratory failure on 3L O2  Does not have significant wheezing, do not think he has COPD exacerbation at this time  -continue PTA inhalers  -wean O2 as above  -treat for pneumonia as above     Hx of VTE/PE  History of subsegmental PE on 11/25/23  - Not on ac due to gi bleed. Hx of IVC filter in the past, not clear if it was removed.     Paroxysmal Atrial fibrillation  Not on anticoagulation due to anemia thought to be due to a GI bleed. Has history of angioextasia and diverticulosis. Tachy on admission, driven by infection.   - monitor on tele  - does not appear to be on metoprolol currently     Severe aortic stenosis s/p bioprosthetic aortic valve  HTN  HLD  Elevated BNP  Chronically elevated troponin  Hx of orthostatic hypotension  Troponin initially 107, 90 on recheck. Both troponin and BNP (30k) higher than previous. Suspect due to sepsis.   - monitor on tele  - will repeat TTE in the morning  - remains on florinef for hypotension      BPH  - Continue PTA Flomax.     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   -treat sepsis  -repeat BMP in the morning     Depression/Anxiety/Pain  - Continue PTA Duloxetine and gabapentin     Deep Tissue Pressure Injury to sacrum and bilateral buttocks,POA  -WOC consulted       Diet:  Dysphagia with thin liquids  DVT Prophylaxis: Pneumatic Compression Devices  Code Status:  Full    Disposition: 3 days likely    Clinically Significant Risk Factors Present on Admission              # Hypoalbuminemia: Lowest albumin = 3.2 g/dL at 2/19/2024  8:54 PM, will monitor as appropriate  # Coagulation Defect: INR = 1.37 (Ref range: 0.85 - 1.15) and/or PTT = N/A, will monitor for bleeding   # Acute Kidney Injury, unspecified: based on a >150% or 0.3 mg/dL increase in last creatinine compared to past 90 day average, will monitor renal function    # Hypertension: Noted on problem list  # Chronic heart failure with preserved ejection fraction: heart failure noted on problem list and last echo with EF >50%                   Charlie Muñiz DO  Hospitalist Service  [facility]  Securely message with [employer] (more info)  Text page via [employer] Paging/Directory     ______________________________________________________________________    Chief Complaint   AMS and increased O2 needs    History is obtained from the patient and ED physician    History of Present Illness   Hermilo Velasquez is a 89 year old male who has a history of lung cancer, afib, COPD, aortic stenosis s/p bioprosthetic valve, c.diff, PE, GI bleed who presents to the ED from home with increased O2 needs, cough and confusion. Patient was admitted to [facility] from 1/15-21 for recurrent diarrhea and concern for c.diff, failure to thrive after previous admission for GI bleed, recurrent c.diff and PE over  the preceding months. Goals of care were discussed during that admission and full restorative cares were elected. Patient had recurrent issues with C.diff and started back on oral vanco with plans to continue vanco until able to be seen at the  to consider fecal transplant which had previously been recommended. He was discharged to TCU on 1/21 and was able to discharge to home from TCU on 2/14. At that point he was continued on vanco. He is on chronic O2 at 3L at baseline. Over the past few days he has noted increasing cough and shortness of breath. There was also concern for some increased confusion compared to baseline. He presented to the ED with temp to 103F, tachycardia, elevated white count and procal as well as CT showing evidnece of progressing bilateral pneumonia. He was started on IV vanco and cefepime. When I saw him he was resting comfortably in bed and denied any significant shortness of breath. No chest pain or abdominal pain. He is unable to tell me how much diarrhea he has been having.       Past Medical History    Past Medical History:   Diagnosis Date    AAA (abdominal aortic aneurysm) (H24)     Adenocarcinoma, lung, 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 Vasques     Aortic stenosis     Severe AS, 9/2015 AVR - [facility] TRIFECTA Bovine bioprosthesis 25MM TF-25A    Atrial fibrillation (H)     9/2015 Paroxysmal post op Afib - discharged on Warfarin and a beta blocker    Bullous pemphigoid (H28)     CKD (chronic kidney disease) stage 3, GFR 30-59 ml/min (H)     COPD (chronic obstructive pulmonary disease) (H)     Deep vein thrombosis (H)     GERD (gastroesophageal reflux disease)     Heart murmur     PATIENCE (iron deficiency anemia)     Monoclonal gammopathy     plasmacyte prominent causing monoclonal gammopathy    Need for SBE (subacute bacterial endocarditis) prophylaxis     Neuropathy     Other and unspecified hyperlipidemia     Other malignant lymphomas     non hodgkin's lymphoma     Pulmonary embolism (H)     RBBB (right bundle branch block)     Severe sepsis with acute organ dysfunction (H) 11/16/2015    Unspecified hereditary and idiopathic peripheral neuropathy        Past Surgical History   Past Surgical History:   Procedure Laterality Date    APPENDECTOMY      ARTHROPLASTY KNEE Right 7/25/2022    Procedure: RIGHT TOTAL KNEE ARTHROPLASTY;  Surgeon: Giuliano Deshpande MD;  Location:  OR    AS TOTAL KNEE ARTHROPLASTY      BACK SURGERY      ESOPHAGOSCOPY, GASTROSCOPY, DUODENOSCOPY (EGD), COMBINED N/A 11/28/2015    Procedure: COMBINED ESOPHAGOSCOPY, GASTROSCOPY, DUODENOSCOPY (EGD);  Surgeon: Danis Castillo MD;  Location:  GI    ESOPHAGOSCOPY, GASTROSCOPY, DUODENOSCOPY (EGD), COMBINED N/A 7/26/2018    Procedure: COMBINED ESOPHAGOSCOPY, GASTROSCOPY, DUODENOSCOPY (EGD);;  Surgeon: Toby Dong DO;  Location:  GI    ESOPHAGOSCOPY, GASTROSCOPY, DUODENOSCOPY (EGD), COMBINED N/A 8/17/2020    Procedure: ESOPHAGOGASTRODUODENOSCOPY (EGD);  Surgeon: Herrera Henry MD;  Location:  GI    ESOPHAGOSCOPY, GASTROSCOPY, DUODENOSCOPY (EGD), COMBINED N/A 10/24/2020    Procedure: ESOPHAGOGASTRODUODENOSCOPY (EGD);  Surgeon: Vick Florentino MD;  Location:  GI    ESOPHAGOSCOPY, GASTROSCOPY, DUODENOSCOPY (EGD), COMBINED N/A 4/11/2022    Procedure: ESOPHAGOGASTRODUODENOSCOPY (EGD);  Surgeon: Vick Florentino MD;  Location:  GI    ESOPHAGOSCOPY, GASTROSCOPY, DUODENOSCOPY (EGD), COMBINED N/A 8/26/2022    Procedure: ESOPHAGOGASTRODUODENOSCOPY (EGD);  Surgeon: El Mcgovern MD;  Location:  GI    HERNIA REPAIR  2006    HERNIORRHAPHY VENTRAL  4/17/2013    Procedure: HERNIORRHAPHY VENTRAL;  VENTRAL HERNIA REPAIR WITH MESH;  Surgeon: Patel Guzman MD;  Location:  OR    KNEE SURGERY      arthroscopic right knee surgery     LOBECTOMY LUNG Right 3/26/2019    POSSIBLE LOBECTOMY LUNG per Dr. Vasques at [facility]    REPAIR LIGAMENT ANKLE  2/23/2012    Procedure:REPAIR LIGAMENT  ANKLE; LEFT TARSAL TUNNEL RELEASE OF KNOT OF ARIEL RELEASE; Surgeon:SAUL PUENTE; Location:[facility] OR    REPLACE VALVE AORTIC N/A 9/3/2015    Procedure: REPLACE VALVE AORTIC;  Surgeon: Antonino Mitchell MD;  Location: [facility] OR    ROTATOR CUFF REPAIR RT/LT      bilateral    SPINE SURGERY      3 spine surgeries    THORACOTOMY, WEDGE RESECTION LUNG, COMBINED Right 3/26/2019    RIGHT THORACOTOMY, WEDGE RESECTION, RIGHT LOWER LOBE LUNG NODULE,;  Surgeon: Jose A Vasques MD;  Location: [facility] OR    TONSILLECTOMY      TURP         Prior to Admission Medications   Prior to Admission Medications   Prescriptions Last Dose Informant Patient Reported? Taking?   DULoxetine (CYMBALTA) 60 MG capsule   Yes No   Sig: Take 60 mg by mouth daily   acetaminophen (TYLENOL) 500 MG tablet  Self Yes No   Sig: Take 1,000 mg by mouth 3 times daily as needed   albuterol (PROAIR HFA/PROVENTIL HFA/VENTOLIN HFA) 108 (90 Base) MCG/ACT inhaler  Self Yes No   Sig: Inhale 2 puffs into the lungs every 6 hours as needed   atorvastatin (LIPITOR) 10 MG tablet  Self No No   Sig: Take 1 tablet (10 mg) by mouth daily   famotidine (PEPCID) 40 MG tablet   No No   Sig: Take 1 tablet (40 mg) by mouth 2 times daily   ferrous sulfate (FE TABS) 325 (65 Fe) MG EC tablet  Self Yes No   Sig: Take 1 tablet (325 mg) by mouth 2 times daily   fludrocortisone (FLORINEF) 0.1 MG tablet  Self No No   Sig: Take 1 tablet (0.1 mg) by mouth daily   gabapentin (NEURONTIN) 300 MG capsule  Self No No   Sig: Take 2 capsules (600 mg) by mouth At Bedtime For neuropathy pain   ofloxacin (FLOXIN) 0.3 % otic solution   No No   Sig: Place 10 drops Into the left ear 2 times daily for 7 days   ondansetron (ZOFRAN ODT) 4 MG ODT tab   No No   Sig: Take 1 tablet (4 mg) by mouth every 6 hours as needed for nausea   potassium chloride ER (KLOR-CON M) 20 MEQ CR tablet   Yes No   Sig: Take 20 mEq by mouth daily   tamsulosin (FLOMAX) 0.4 MG capsule   No No   Sig: Take 1 capsule (0.4  mg) by mouth daily   tiotropium (SPIRIVA) 18 MCG inhaled capsule  Self Yes No   Sig: Inhale 18 mcg into the lungs daily   vancomycin (FIRVANQ) 50 MG/ML oral solution   No No   Sig: Take 2.5 mLs (125 mg) by mouth daily Takes 4 times daily x 10 days original start date 01/15. Continue oral taper once complete      Facility-Administered Medications: None        Review of Systems    The 10 point Review of Systems is negative other than noted in the HPI or here.      Physical Exam   Vital Signs: Temp: 99.5  F (37.5  C) Temp src: Oral BP: 112/75 Pulse: 103   Resp: 21 SpO2: 99 % O2 Device: Oxymask Oxygen Delivery: 3 LPM  Weight: 0 lbs 0 oz    Gen: lying in bed, appears ill  CV: mild tachycardia, 2/6 systolic murmur heard throughout  Pulm: Coarse breath sounds at the bilateral bases  GI: +BS, soft, NT/ND  Lymph: no edema    Medical Decision Making       75 MINUTES SPENT BY ME on the date of service doing chart review, history, exam, documentation & further activities per the note.      Data     I have personally reviewed the following data over the past 24 hrs:    17.1 (H)  \   10.0 (L)   / 162     138 99 19.4 /  178 (H)   3.9 26 1.51 (H) \     ALT: 7 AST: 17 AP: 93 TBILI: 0.9   ALB: 3.2 (L) TOT PROTEIN: 6.8 LIPASE: N/A     Trop: 90 (H) BNP: 30,280 (H)     Procal: 21.39 (HH) CRP: N/A Lactic Acid: 1.8       INR:  1.37 (H) PTT:  N/A   D-dimer:  N/A Fibrinogen:  N/A       Imaging results reviewed over the past 24 hrs:   Recent Results (from the past 24 hour(s))   Head CT w/o contrast    Narrative    EXAM: CT HEAD W/O CONTRAST  LOCATION: [facility]  DATE: 2/19/2024    INDICATION: AMS  COMPARISON: 01/17/2024  TECHNIQUE: Routine CT Head without IV contrast. Multiplanar reformats. Dose reduction techniques were used.    FINDINGS:  INTRACRANIAL CONTENTS: No intracranial hemorrhage, extraaxial collection, or mass effect.  No CT evidence of acute infarct. Mild presumed chronic small vessel ischemic changes.  Mild to moderate generalized volume loss. No hydrocephalus.     VISUALIZED ORBITS/SINUSES/MASTOIDS: No intraorbital abnormality. Scattered nonspecific mucosal thickening. No middle ear or mastoid effusion.    BONES/SOFT TISSUES: No acute abnormality.      Impression    IMPRESSION:  1.  No CT evidence for acute intracranial process.  2.  Brain atrophy and presumed chronic microvascular ischemic changes as above.   CT Chest (PE) Abdomen Pelvis w Contrast    Narrative    EXAM: CT CHEST PE ABDOMEN PELVIS W CONTRAST  LOCATION: [facility]  DATE: 2/19/2024    INDICATION: Hypoxia, hx of colitis. Shortness of breath. History of lung cancer.  COMPARISON: 11/26/2023, 09/12/2023, 09/11/2023 and multiple other priors.  TECHNIQUE: CT chest pulmonary angiogram and routine CT abdomen pelvis with IV contrast. Arterial phase through the chest and venous phase through the abdomen and pelvis. Multiplanar reformats and MIP reconstructions were performed. Dose reduction   techniques were used.   CONTRAST: 70 mL Isovue-370.    FINDINGS:  ANGIOGRAM CHEST: Pulmonary arteries are normal caliber and negative for pulmonary emboli. Normal-caliber thoracic aorta. No evidence for dissection allowing for phase of contrast opacification. Marked vascular calcification diffusely.     LUNGS AND PLEURA: Dense consolidative alveolar infiltrate right lower lobe with air bronchogram formation compatible with right lower lobe pneumonia. Patchy infiltrate left lower lobe compatible with pneumonia. Moderate emphysema. Loculated pleural fluid   right hemithorax in the posterolateral costophrenic angle and posterior upper aspect of the right hemithorax.    MEDIASTINUM/AXILLAE: Cardiac enlargement with biatrial enlargement. No pericardial fluid. Normal-caliber esophagus. Mildly prominent mediastinal and hilar lymph nodes likely reactive.    CORONARY ARTERY CALCIFICATION: Moderate.    HEPATOBILIARY: Hepatic cirrhosis. Unchanged hepatic  cysts. Gallbladder and biliary system unremarkable.    PANCREAS: Diffuse atrophy. No ductal dilatation or inflammatory change.    SPLEEN: Normal.    ADRENAL GLANDS: Normal.    KIDNEYS/BLADDER: Mild atrophy. Cortical scarring bilaterally. No urinary collecting system dilatation or calculi. Bladder unremarkable. Moderate prostate enlargement.    BOWEL: No obstruction or inflammatory change.    LYMPH NODES: No lymphadenopathy.    VASCULATURE: Unchanged infrarenal abdominal aortic aneurysmal dilatation. No aortic dissection. Marked atherosclerotic vascular calcification.    PELVIC ORGANS: No free fluid.    MUSCULOSKELETAL: Marked degenerative changes throughout the spine.      Impression    IMPRESSION:  1.  Significant interval increase in consolidative alveolar infiltrate throughout the right lower lobe. Greater patchy alveolar infiltrates in the left lower lobe obscuring prior pulmonary nodularity to the left lower lobe. Findings compatible with   progression of bilateral pneumonia.    2.  Loculated pleural fluid posterior right costophrenic angle and posterior right upper hemithorax is increased since prior.    3.  No pulmonary embolism.     4.  Prior right lower lobe and left lower lobe pulmonary embolic disease has resolved.    5.  Normal-caliber aorta without dissection.    6.  Hepatic cirrhosis with unchanged hepatic cyst.

## 2024-02-20 NOTE — TELEPHONE ENCOUNTER
Verbal approval given to home care via detailed VM per provider's direction below.    MOLLY HARVEY RN

## 2024-02-20 NOTE — PROGRESS NOTES
Hospitalist brief update note:    H&P reviewed, patient was seen this morning.  Appears disoriented, knows his date of birth but states he is in of brick building/dentist office.  Stated its January 2024.  States his breathing is okay.  Denies pain.  Nursing concerned about aspiration, has upper airway gurgle.    CT chest abdomen pelvis reviewed, noted small loculated effusion, discussed with thoracic surgery, will send him for thoracentesis with fluid analysis.  On cefepime.  ID consulted and IV vancomycin discontinued.  Oral vancomycin for prophylaxis given history of C. difficile.  Speech therapy consulted.  Discussed with his wife Roberta and recommended palliative consult.  Noted patient is full code.  She is agreeable to meet with palliative care.  Consulted.    Continue with care plan as outlined on admission.    Areli Davis MD  Hospitalist

## 2024-02-20 NOTE — CONSULTS
[facility]    Infectious Disease Consultation     Date of Admission:  2/19/2024  Date of Consult (When I saw the patient): 02/20/24    Assessment & Plan   Hermilo Velasquez is a 89 year old who was admitted on 2/19/2024.     Impression:  89 yo with a history of lung cancer s/p wedge resection  History of recurrent c.diff  Afib  COPD, PE  Aortic stenosis s/p bioprosthetic heart valve  CKD .   Admitted with cough, shortness of breath, fever  On IV vanco and cefepime and also on oral vanco   PCN listed as childhood allergy     Recommendations:   Stop IV vancomycin as MRSA PCR negative, good predictor for low possibility of MRSA pneumonia also patient with CKD and advanced as all increase the nephrotoxicity associated with IV vancomycin   Continue on oral vanco, seems like was on some sort of taper for a while now, make the does BID for prophylaxis last positive C diff was 2 months ago   Add IV flagyl and continue on IV cefepime   Loculated pleural effusion plan for thora noted please send cultures   Obtain sputum cultures       Venkatesh Rhodes MD    Reason for Consult   Reason for consult: I was asked to evaluate this patient for bilateral pneumonia.    Primary Care Physician   Karson Bishop    Chief Complaint   Shortn of breath     History is obtained from the patient and medical records    History of Present Illness   Hermilo Velasquez is a 89 year old male who presents with shortness of breath cough ongoinf for a few days prior to admission     Past Medical History   I have reviewed this patient's medical history and updated it with pertinent information if needed.   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 Vasques     Aortic stenosis     Severe AS, 9/2015 AVR - [facility] TRIFECTA Bovine bioprosthesis 25MM TF-25A    Atrial fibrillation (H)     9/2015 Paroxysmal post op  Afib - discharged on Warfarin and a beta blocker    Bullous pemphigoid (H28)     CKD (chronic kidney disease) stage 3, GFR 30-59 ml/min (H)     COPD (chronic obstructive pulmonary disease) (H)     Deep vein thrombosis (H)     GERD (gastroesophageal reflux disease)     Heart murmur     PATIENCE (iron deficiency anemia)     Monoclonal gammopathy     plasmacyte prominent causing monoclonal gammopathy    Need for SBE (subacute bacterial endocarditis) prophylaxis     Neuropathy     Other and unspecified hyperlipidemia     Other malignant lymphomas     non hodgkin's lymphoma    Pulmonary embolism (H)     RBBB (right bundle branch block)     Severe sepsis with acute organ dysfunction (H) 11/16/2015    Unspecified hereditary and idiopathic peripheral neuropathy        Past Surgical History   I have reviewed this patient's surgical history and updated it with pertinent information if needed.  Past Surgical History:   Procedure Laterality Date    APPENDECTOMY      ARTHROPLASTY KNEE Right 7/25/2022    Procedure: RIGHT TOTAL KNEE ARTHROPLASTY;  Surgeon: Giuliano Deshpande MD;  Location:  OR    AS TOTAL KNEE ARTHROPLASTY      BACK SURGERY      ESOPHAGOSCOPY, GASTROSCOPY, DUODENOSCOPY (EGD), COMBINED N/A 11/28/2015    Procedure: COMBINED ESOPHAGOSCOPY, GASTROSCOPY, DUODENOSCOPY (EGD);  Surgeon: Danis Castillo MD;  Location:  GI    ESOPHAGOSCOPY, GASTROSCOPY, DUODENOSCOPY (EGD), COMBINED N/A 7/26/2018    Procedure: COMBINED ESOPHAGOSCOPY, GASTROSCOPY, DUODENOSCOPY (EGD);;  Surgeon: Toby Dong DO;  Location:  GI    ESOPHAGOSCOPY, GASTROSCOPY, DUODENOSCOPY (EGD), COMBINED N/A 8/17/2020    Procedure: ESOPHAGOGASTRODUODENOSCOPY (EGD);  Surgeon: Herrera Henry MD;  Location:  GI    ESOPHAGOSCOPY, GASTROSCOPY, DUODENOSCOPY (EGD), COMBINED N/A 10/24/2020    Procedure: ESOPHAGOGASTRODUODENOSCOPY (EGD);  Surgeon: Vick Florentino MD;  Location:  GI    ESOPHAGOSCOPY, GASTROSCOPY, DUODENOSCOPY (EGD), COMBINED  N/A 4/11/2022    Procedure: ESOPHAGOGASTRODUODENOSCOPY (EGD);  Surgeon: Vick Florentino MD;  Location:  GI    ESOPHAGOSCOPY, GASTROSCOPY, DUODENOSCOPY (EGD), COMBINED N/A 8/26/2022    Procedure: ESOPHAGOGASTRODUODENOSCOPY (EGD);  Surgeon: El Mcgovern MD;  Location:  GI    HERNIA REPAIR  2006    HERNIORRHAPHY VENTRAL  4/17/2013    Procedure: HERNIORRHAPHY VENTRAL;  VENTRAL HERNIA REPAIR WITH MESH;  Surgeon: Patel Guzman MD;  Location:  OR    KNEE SURGERY      arthroscopic right knee surgery     LOBECTOMY LUNG Right 3/26/2019    POSSIBLE LOBECTOMY LUNG per Dr. Vasques at [facility]    REPAIR LIGAMENT ANKLE  2/23/2012    Procedure:REPAIR LIGAMENT ANKLE; LEFT TARSAL TUNNEL RELEASE OF KNOT OF ARIEL RELEASE; Surgeon:SAUL PUENTE; Location: OR    REPLACE VALVE AORTIC N/A 9/3/2015    Procedure: REPLACE VALVE AORTIC;  Surgeon: Antonino Mitchell MD;  Location:  OR    ROTATOR CUFF REPAIR RT/LT      bilateral    SPINE SURGERY      3 spine surgeries    THORACOTOMY, WEDGE RESECTION LUNG, COMBINED Right 3/26/2019    RIGHT THORACOTOMY, WEDGE RESECTION, RIGHT LOWER LOBE LUNG NODULE,;  Surgeon: Jose A Vasques MD;  Location:  OR    TONSILLECTOMY      TURP         Prior to Admission Medications   Prior to Admission Medications   Prescriptions Last Dose Informant Patient Reported? Taking?   DULoxetine (CYMBALTA) 60 MG capsule   Yes No   Sig: Take 60 mg by mouth daily   acetaminophen (TYLENOL) 500 MG tablet  Self Yes No   Sig: Take 1,000 mg by mouth 3 times daily as needed   albuterol (PROAIR HFA/PROVENTIL HFA/VENTOLIN HFA) 108 (90 Base) MCG/ACT inhaler  Self Yes No   Sig: Inhale 2 puffs into the lungs every 6 hours as needed   atorvastatin (LIPITOR) 10 MG tablet  Self No No   Sig: Take 1 tablet (10 mg) by mouth daily   famotidine (PEPCID) 40 MG tablet   No No   Sig: Take 1 tablet (40 mg) by mouth 2 times daily   ferrous sulfate (FE TABS) 325 (65 Fe) MG EC tablet  Self Yes No  "  Sig: Take 1 tablet (325 mg) by mouth 2 times daily   fludrocortisone (FLORINEF) 0.1 MG tablet  Self No No   Sig: Take 1 tablet (0.1 mg) by mouth daily   gabapentin (NEURONTIN) 300 MG capsule  Self No No   Sig: Take 2 capsules (600 mg) by mouth At Bedtime For neuropathy pain   ofloxacin (FLOXIN) 0.3 % otic solution   No No   Sig: Place 10 drops Into the left ear 2 times daily for 7 days   ondansetron (ZOFRAN ODT) 4 MG ODT tab   No No   Sig: Take 1 tablet (4 mg) by mouth every 6 hours as needed for nausea   potassium chloride ER (KLOR-CON M) 20 MEQ CR tablet   Yes No   Sig: Take 20 mEq by mouth daily   tamsulosin (FLOMAX) 0.4 MG capsule   No No   Sig: Take 1 capsule (0.4 mg) by mouth daily   tiotropium (SPIRIVA) 18 MCG inhaled capsule  Self Yes No   Sig: Inhale 18 mcg into the lungs daily   vancomycin (FIRVANQ) 50 MG/ML oral solution   No No   Sig: Take 2.5 mLs (125 mg) by mouth daily Takes 4 times daily x 10 days original start date 01/15. Continue oral taper once complete      Facility-Administered Medications: None     Allergies   Allergies   Allergen Reactions    Omeprazole Itching    Pantoprazole Itching    Prevacid [Lansoprazole] Itching    Lasix [Furosemide] Rash    Lidocaine Blisters and Rash     Allergy to lidocaine ointment  Allergy to lidocaine ointment      Penicillin G Rash    Penicillins Rash     \"broke out from injection\" 60 yrs ago  Tolerates cephalosporins       Immunization History   Immunization History   Administered Date(s) Administered    COVID-19 12+ (2023-24) (Pfizer) 10/19/2023    COVID-19 Bivalent 12+ (Pfizer) 11/23/2022, 06/20/2023    COVID-19 MONOVALENT 12+ (Pfizer) 01/20/2021, 02/10/2021, 10/15/2021    COVID-19 Monovalent 12+ (Pfizer 2022) 04/26/2022    DT (PEDS <7y) 07/25/1996    Influenza (H1N1) 01/20/2010    Influenza (High Dose) 3 valent vaccine 11/03/2014, 10/01/2015, 10/01/2016, 11/08/2016, 11/02/2017, 12/10/2018, 10/11/2019    Influenza (IIV3) PF 09/23/2009    Influenza Vaccine " "65+ (Fluzone HD) 10/15/2020, 09/28/2021, 11/23/2022, 09/07/2023    Pneumo Conj 13-V (2010&after) 11/08/2014    Pneumococcal 23 valent 07/01/2014    Td (Adult), Adsorbed 06/01/2012    Zoster recombinant adjuvanted (SHINGRIX) 12/10/2018, 07/21/2019    Zoster vaccine, live 07/01/2014, 07/01/2019       Social History   I have reviewed this patient's social history and updated it with pertinent information if needed. Hermilo Velasquez  reports that he quit smoking about 26 years ago. His smoking use included cigarettes. He has a 110 pack-year smoking history. He has never used smokeless tobacco. He reports that he does not currently use alcohol. He reports that he does not use drugs.    Family History   I have reviewed this patient's family history and updated it with pertinent information if needed.   Family History   Problem Relation Age of Onset    C.A.D. Father     Emphysema Father     Melanoma No family hx of     Skin Cancer No family hx of        Review of Systems   The 10 point Review of Systems is negative    Physical Exam   Temp: 98.5  F (36.9  C) Temp src: Axillary BP: 127/79 Pulse: 103   Resp: 20 SpO2: 98 % O2 Device: Oxymask Oxygen Delivery: 2 LPM  Vital Signs with Ranges  Temp:  [98.5  F (36.9  C)-103.5  F (39.7  C)] 98.5  F (36.9  C)  Pulse:  [] 103  Resp:  [20-37] 20  BP: (106-127)/(61-79) 127/79  SpO2:  [97 %-100 %] 98 %  0 lbs 0 oz  There is no height or weight on file to calculate BMI.    GENERAL APPEARANCE:  awake  EYES: Eyes grossly normal to inspection  NECK: no adenopathy  RESP: lungs clear   CV: regular rates and rhythm  LYMPHATICS: normal ant/post cervical and supraclavicular nodes  ABDOMEN: soft, nontender  MS: extremities normal  SKIN: no suspicious lesions or rashes        Data   All laboratory and imaging data in the past 24 hours reviewed  No results for input(s): \"CULT\" in the last 168 hours.  Recent Labs   Lab Test 04/14/21 0815 04/13/21 2054 04/13/21 2038 09/13/20  1329 " "09/13/20  1256 08/15/20  1451 03/22/20  1950 03/22/20  1941 03/22/20  1935   CULT Canceled, Test credited  >10 Squamous epithelial cells/low power field indicates oral contamination. Please   recollect.  *  Notification of test cancellation was given to  Elva Ospina RN 1139 4/14/20 by AM   No growth No growth No growth No growth No growth  No growth No growth Cultured on the 4th day of incubation:  Staphylococcus capitis  Identification obtained by MALDI-TOF mass spectrometry research use only database. Test   characteristics determined and verified by the Infectious Diseases Diagnostic Laboratory   ([facility]) [place], [place].  *  Critical Value/Significant Value, preliminary result only, called to and read back by  ANNABELLE CABRERA RN  03.26.20 CF    (Note)  POSITIVE for Staphylococci other than S.aureus, S.epidermidis and  S.lugdunensis, by Verigene multiplex nucleic acid test.  Coagulase-negative staphylococci are the most common venipuncture or  collection associated skin CONTAMINANTS grown in blood cultures.  Final identification and antimicrobial susceptibility testing will be  verified by standard methods.    Specimen tested with Verigene multiplex, gram-positive blood culture  nucleic acid test for the following targets: Staph aureus, Staph  epidermidis, Staph lugdunensis, other Staph species, Enterococcus  faecalis, Enterococcus faecium, Streptococcus species, S. agalactiae,  S. anginosus grp., S. pneumoniae, S. pyogenes, Listeria sp., mecA  (methicillin resistance) and Samanta/B (vancomycin resistance).    Critical Value/Significant Value called to and read back by Doreen Silverman RN on 3.26.20 at 0727. bw   No growth          All cultures:  No results for input(s): \"CULTURE\" in the last 168 hours.   Blood culture:  Results for orders placed or performed during the hospital encounter of 11/25/23   Blood Culture Peripheral Blood    Specimen: Peripheral Blood   Result Value Ref Range    Culture No " Growth    Blood Culture Peripheral Blood    Specimen: Peripheral Blood   Result Value Ref Range    Culture No Growth    Results for orders placed or performed during the hospital encounter of 09/12/23   Blood Culture Peripheral Blood    Specimen: Peripheral Blood   Result Value Ref Range    Culture No Growth    Blood Culture Peripheral Blood    Specimen: Peripheral Blood   Result Value Ref Range    Culture No Growth    Results for orders placed or performed during the hospital encounter of 11/06/22   Blood Culture Wrist, Left    Specimen: Wrist, Left; Blood   Result Value Ref Range    Culture No Growth    Blood Culture Peripheral Blood    Specimen: Peripheral Blood   Result Value Ref Range    Culture No Growth    Results for orders placed or performed during the hospital encounter of 10/09/22   Blood Culture Peripheral Blood    Specimen: Peripheral Blood   Result Value Ref Range    Culture No Growth    Blood Culture Peripheral Blood    Specimen: Peripheral Blood   Result Value Ref Range    Culture No Growth    Results for orders placed or performed during the hospital encounter of 04/10/22   Blood Culture Line, venous    Specimen: Line, venous; Blood   Result Value Ref Range    Culture No Growth    Blood Culture Peripheral Blood    Specimen: Peripheral Blood   Result Value Ref Range    Culture No Growth    Results for orders placed or performed during the hospital encounter of 04/13/21   Blood culture    Specimen: Blood    Left Arm   Result Value Ref Range    Specimen Description Blood Left Arm     Culture Micro No growth    Blood culture    Specimen: Blood    Right Arm   Result Value Ref Range    Specimen Description Blood Right Arm     Culture Micro No growth    Results for orders placed or performed during the hospital encounter of 09/13/20   Blood culture    Specimen: Blood    Right Arm   Result Value Ref Range    Specimen Description Blood Right Arm     Culture Micro No growth    Blood culture    Specimen:  Blood    Left Arm   Result Value Ref Range    Specimen Description Blood Left Arm     Culture Micro No growth    Results for orders placed or performed during the hospital encounter of 08/15/20   Blood culture    Specimen: Blood    Left Arm   Result Value Ref Range    Specimen Description Blood Left Arm     Culture Micro No growth    Blood culture    Specimen: Blood    Right Arm   Result Value Ref Range    Specimen Description Blood Right Arm     Culture Micro No growth    Results for orders placed or performed during the hospital encounter of 03/22/20   Blood culture    Specimen: Blood    Right Arm   Result Value Ref Range    Specimen Description Blood Right Arm     Culture Micro (A)      Cultured on the 4th day of incubation:  Staphylococcus capitis  Identification obtained by MALDI-TOF mass spectrometry research use only database. Test   characteristics determined and verified by the Infectious Diseases Diagnostic Laboratory   ([facility]) [place], [place].      Culture Micro       Critical Value/Significant Value, preliminary result only, called to and read back by  ANNABELLE CABRERA RN  03.26.20 CF      Culture Micro       (Note)  POSITIVE for Staphylococci other than S.aureus, S.epidermidis and  S.lugdunensis, by Verigene multiplex nucleic acid test.  Coagulase-negative staphylococci are the most common venipuncture or  collection associated skin CONTAMINANTS grown in blood cultures.  Final identification and antimicrobial susceptibility testing will be  verified by standard methods.    Specimen tested with Verigene multiplex, gram-positive blood culture  nucleic acid test for the following targets: Staph aureus, Staph  epidermidis, Staph lugdunensis, other Staph species, Enterococcus  faecalis, Enterococcus faecium, Streptococcus species, S. agalactiae,  S. anginosus grp., S. pneumoniae, S. pyogenes, Listeria sp., mecA  (methicillin resistance) and Samanta/B (vancomycin resistance).    Critical  Value/Significant Value called to and read back by Doreen Silverman RN on 3.26.20 at 0727. bw         Susceptibility    Staphylococcus capitis - SOCRATES     Ciprofloxacin <=0.5 Susceptible ug/mL     Clindamycin <=0.25 Susceptible ug/mL     Erythromycin 0.5 Susceptible ug/mL     Gentamicin <=0.5 Susceptible ug/mL     Levofloxacin 0.5 Susceptible ug/mL     Oxacillin <=0.25 Susceptible ug/mL     Tetracycline <=1 Susceptible ug/mL     Vancomycin <=0.5 Susceptible ug/mL   Blood culture    Specimen: Blood    Left Arm   Result Value Ref Range    Specimen Description Blood Left Arm     Culture Micro No growth    Results for orders placed or performed during the hospital encounter of 12/22/19   Blood culture    Specimen: Blood    Right Arm   Result Value Ref Range    Specimen Description Blood Right Arm     Special Requests Aerobic and anaerobic bottles received     Culture Micro (A)      Cultured on the 2nd day of incubation:  Staphylococcus hominis      Culture Micro       Critical Value/Significant Value, preliminary result only, called to and read back by  DAVID GARCIA RN @1019 12/24/19. SCG      Culture Micro       (Note)  POSITIVE for Staphylococci other than S.aureus, S.epidermidis and  S.lugdunensis, by Casting[employer] multiplex nucleic acid test.  Coagulase-negative staphylococci are the most common venipuncture or  collection associated skin CONTAMINANTS grown in blood cultures.  Final identification and antimicrobial susceptibility testing will be  verified by standard methods.    Specimen tested with Verigene multiplex, gram-positive blood culture  nucleic acid test for the following targets: Staph aureus, Staph  epidermidis, Staph lugdunensis, other Staph species, Enterococcus  faecalis, Enterococcus faecium, Streptococcus species, S. agalactiae,  S. anginosus grp., S. pneumoniae, S. pyogenes, Listeria sp., mecA  (methicillin resistance) and Samanta/B (vancomycin resistance).    Critical Value/Significant Value called to  and read back by Aurora Giron RN at 5315 12.24.19.DK         Susceptibility    Staphylococcus hominis - SOCRATES     Ciprofloxacin <=0.5 Susceptible ug/mL     Clindamycin* <=0.25 Susceptible ug/mL      * This isolate DOES NOT demonstrate inducible clindamycin resistance in vitro. Clindamycin is susceptible and could be used when indicated, however, erythromycin is resistant and should not be used.     Erythromycin >=8 Resistant ug/mL     Gentamicin <=0.5 Susceptible ug/mL     Levofloxacin <=0.12 Susceptible ug/mL     Oxacillin <=0.25 Susceptible ug/mL     Tetracycline 2 Susceptible ug/mL     Vancomycin <=0.5 Susceptible ug/mL   Blood culture    Specimen: Blood    Right Hand   Result Value Ref Range    Specimen Description Blood Right Hand     Special Requests Aerobic and anaerobic bottles received     Culture Micro No growth    Results for orders placed or performed during the hospital encounter of 05/13/19   Blood culture    Specimen: Arm, Left; Blood    Left Arm   Result Value Ref Range    Specimen Description Blood Left Arm     Special Requests Aerobic and anaerobic bottles received     Culture Micro No growth    Blood culture    Specimen: Arm, Left; Blood    Left Arm   Result Value Ref Range    Specimen Description Blood Left Arm     Special Requests Aerobic and anaerobic bottles received     Culture Micro No growth      *Note: Due to a large number of results and/or encounters for the requested time period, some results have not been displayed. A complete set of results can be found in Results Review.      Urine culture:  Results for orders placed or performed during the hospital encounter of 01/15/24   Urine Culture    Specimen: Urine, Clean Catch   Result Value Ref Range    Culture <10,000 CFU/mL Mixture of Urogenital Sylvia    Results for orders placed or performed during the hospital encounter of 10/09/22   Urine Culture    Specimen: Urine, Midstream   Result Value Ref Range    Culture No Growth    Results for  orders placed or performed during the hospital encounter of 04/10/22   Urine Culture    Specimen: Urine, Catheter   Result Value Ref Range    Culture No Growth    Results for orders placed or performed during the hospital encounter of 03/22/20   Urine Culture    Specimen: Catheterized Urine   Result Value Ref Range    Specimen Description Catheterized Urine     Special Requests Specimen received in preservative     Culture Micro No growth      *Note: Due to a large number of results and/or encounters for the requested time period, some results have not been displayed. A complete set of results can be found in Results Review.

## 2024-02-20 NOTE — CONSULTS
"Palliative Care Consultation Note  [facility]      Patient: Hermilo Velasquez  Date of Admission:  2/19/2024    Requesting Clinician / Team: Dr Davis/Medicine  Reason for consult: Goals of care     Recommendations & Counseling     GOALS OF CARE:   Continue current cares at this time.  A family meeting has been planned for Thursday 2/22 in room with patient. Wife Roberta, son Mikal and a daughter will be present.     ADVANCE CARE PLANNING:  No health care directive on file. Per  informed consent policy, next of kin should be involved if patient becomes unable.  There is a POLST form on file, this was reviewed and current.  Code status: Full Code    MEDICAL MANAGEMENT:   #Pain,chronic - tingling toe/foot pain- likely related to neuropathy.  Acetaminophen (Tylenol), PRN  Could consider gabapentin for neuropathic foot pain but may increase his confusion.     #Delirium  Avoid benzodiazepines, antihistamines, anticholinergics if able.  Lights on and blinds open during the day.  Reorient frequently.  Lights & TV off during the night.  Promote normal circadian rhythm.  Limit sensory deprivation - utilize hearing aids, glasses, etc.  Frequently assess basic needs such as temperature, elimination, thirst/hunger, pain      #General Weakness/Debility   PT/OT as tolerated.  Appreciate Speech Language therapy for swallow evaluation.    PSYCHOSOCIAL/SPIRITUAL SUPPORT:  Family wife Roberta, son Mikal and a daughter  [place] community: [place]     Palliative Care will continue to follow. Thank you for the consult and allowing us to aid in the care of Hermilo Velasquez.    Reviewed patient case with hospitalist Dr Davis,     Razia Osman, CNS  Securely message with [employer] (more info)  Text page via [employer] Paging/Directory      During regular M-F work hours ([phone]) -- please contact me on [employer]   In my absence, securely message our team via [employer]oc[employer] \"[facility]\" or go to On Call tab in " "[employer], search for \"[facility] [place]\"   After regular work hours and on weekends/holidays, to reach our on call physician, securely message via [employer] \"[facility] [place]\" or go to On Call tab in [employer], search for \"[facility] [place]\"     Palliative Summary/HPI     Hermilo Velasquez is a 89 year old male with a past medical history of 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 Had just been discharged from TCU and went home for a few days but quickly decompensated. Concern for aspiration pneumonia, loculated lung abx may not be effective for. Current hospitalization will be his 5th within the past 5 months.    Today, the patient was seen for:  Goals of care.    Palliative Care Summary:   Met with Hermilo in his room and later spoke with wife Roberta on the phone.   I introduced our role as an extra layer of support and how we help patients and families dealing with serious, potentially life-limiting illnesses. I explained the composition of the palliative care team.  Palliative care helps patients and families navigate their care while focusing on the whole person; providing emotional, social and spiritual support  Palliative care often assists with symptom management, information sharing about what to expect from the illness, available treatment options and what effect those options may have on the disease course, and provide effective communication and caring support. and Introduced the role of " palliative care as an interdisciplinary team that cares for patients with serious illness to help support symptom management, communication, coping for patients and their families as well as support with medical decision making.    Prognosis, Goals, & Planning:    Functional Status just prior to this current hospitalization:  has been hospitalized 4 times in the past 5 months for recurrent c-diff colitis, pneumonia, COPD, respiratory failure, encephalopathy, . Prior to admission patient was in TCU for further rehab/strengthening and discharged home, but soon after was readmitted to hospital.   Palliative Performance Scale (PPS): 50%  Extensive disease. Normal or reduced intake; normal LOC or confusion; little ambulation (mainly sit/lie), ADLs w/much assistance, unable to do any work.    ECOG3 (Capable of only limited self-care; needs help with ADLs; in bed/chair >50% of waking hours)  Interim history/status while hospitalized: concerns regarding aspiration pneumonia, SLT may evaluate. Upon further assessment he was found to have a fever; CT scan identified bilateral pneumonia and loculated pleural effusion- plan is for thoracentesis. ID and WOC following.    Prognosis, Goals, and/or Advance Care Planning:  I spoke with patient's wife Roberta on the phone today.  I explained that I met with her, Hermilo and their son Mikal during his last hospital admission.  Since then he had been discharged to TCU and then went home for a few days but was quickly readmitted to hospital.  Roberta said that there were some difficulties with his oxygen delivery system and his breathing got bad so they called 911.  Discussed concerns over his frequent hospitalizations and inability to remain stable for very long outside of the hospital setting.  Roberta stated that they were thinking that Hermilo may have swallowing difficulties with risk from aspiration pneumonia.  Evie also explained that Hermilo was due seeing oncology on Friday and possibly  have a biopsy of his tongue on Monday.  She was wondering if he could have the biopsy here at the hospital? I explained that the hospitalist wanted to set up a time for family to come into hospital to discuss Hermilo's condition and concerns regarding his medical status. Roberta expressed concern that the meeting was to tell them that Hermilo could not go home to live. I explained that the meeting is more focused on medical treatments that may or may not be helpful for Hermilo and what his preferences would be. Again expressed concern that Hermilo my be admitted to hospital frequently as he has over the past 5 months.     Code Status was addressed today:   Yes, We discussed potential risks and rationale of attempting cardiac resuscitation, intubation, and mechanical ventilation.  We also discussed probability of survival as well as quality of life implications.  Based on this discussion, patient or surrogate response/decision: Hermilo stated that he might not want CPR anymore. I said I would review his comments with his wife Roberta. Roberta said she defers to Hermilo's wishes on this subject but due to his fluctuating mental status would be best to review at the 2/22 family meeting.      Patient's decision making preferences: shared with support from loved ones        Patient has decision-making capacity today for complex decisions: unreliable.          Coping, Meaning, & Spirituality:   Mood, coping, and/or meaning in the context of serious illness were addressed today: Yes Roberta states that the most important thing for Hermilo is for him to be at home and not live in a facility.    Social:   Living situation:lives with significant other/spouse  Important relationships/caregivers:wife Roberta and son Mikal    Medications:  I have reviewed this patient's medication profile and medications from this hospitalization. Notable medications:     Noted scheduled meds are:   ceFEPIme  2 g Intravenous Q12H    ipratropium - albuterol 0.5 mg/2.5  mg/3 mL  3 mL Nebulization 4x Daily    metroNIDAZOLE  500 mg Intravenous Q12H    sodium chloride (PF)  3 mL Intracatheter Q8H    vancomycin  125 mg Oral Q6H     Noted PRN meds are:  acetaminophen **OR** acetaminophen, albuterol, calcium carbonate, ondansetron **OR** ondansetron, prochlorperazine **OR** prochlorperazine **OR** prochlorperazine, sodium chloride (PF)    ROS:  Comprehensive ROS may not be reliable due to mental status    Current condition: Hermilo is lying in bed. Appears comfortable. He is a limited historian and thinks he came to hospital due to falling at home. He does not exhibit an understanding of his medical condition at this time.    PHYSICAL EXAM:  Vital Signs: Temp: 98.5  F (36.9  C) Temp src: Axillary BP: 127/79 Pulse: 103   Resp: 20 SpO2: 98 % O2 Device: Oxymask Oxygen Delivery: 4 LPM  Weight: 0 lbs 0 oz  Wt Readings from Last 4 Encounters:   02/13/24 62.6 kg (138 lb)   02/12/24 62.6 kg (138 lb)   02/09/24 63.5 kg (140 lb)   02/07/24 63.5 kg (140 lb)     GENERAL:  Alert, fatigued, no  distress, appears frail, cachectic, chronically ill appearance.  HEAD: Normocephalic atraumatic  SCLERA: Anicteric  EXTREMITIES: Warm;  ABDOMEN:  Soft, flat  RESPIRATORY: Breathing non labored on 4 L via oxy mask  NEUROLOGIC: Alert, can follow directions.  PSYCH: Calm, cooperative, confused.    Data reviewed:  Lab Results   Component Value Date    WBC 21.3 (H) 02/20/2024    WBC 17.1 (H) 02/19/2024    WBC 6.2 01/19/2024    HGB 9.3 (L) 02/20/2024    HGB 10.0 (L) 02/19/2024    HGB 9.2 (L) 01/19/2024    HCT 29.3 (L) 02/20/2024    HCT 31.9 (L) 02/19/2024    HCT 29.1 (L) 01/19/2024     (L) 02/20/2024     02/19/2024     (L) 01/19/2024     02/20/2024     02/19/2024     01/19/2024    POTASSIUM 3.8 02/20/2024    POTASSIUM 3.9 02/19/2024    POTASSIUM 3.6 01/20/2024    CHLORIDE 105 02/20/2024    CHLORIDE 99 02/19/2024    CHLORIDE 106 01/19/2024    CO2 25 02/20/2024    CO2 26 02/19/2024     CO2 30 (H) 01/19/2024    BUN 19.8 02/20/2024    BUN 19.4 02/19/2024    BUN 12.7 01/19/2024    CR 1.36 (H) 02/20/2024    CR 1.51 (H) 02/19/2024    CR 0.79 01/19/2024     (H) 02/20/2024     (H) 02/19/2024     (H) 01/19/2024    SED 43 (H) 10/06/2022    SED 89 (H) 05/16/2019    SED 83 (H) 05/15/2019    DD 4.6 (H) 03/30/2020    NTBNPI 30,280 (H) 02/19/2024    NTBNPI 24,255 (H) 01/15/2024    NTBNPI 6,480 (H) 11/25/2023    NTBNP 1,233 (H) 08/28/2020    NTBNP 1,593 (H) 08/11/2020    NTBNP 1,363 (H) 10/17/2014    TROPI 0.513 (HH) 04/14/2021    TROPI 0.529 (HH) 04/14/2021    TROPI 0.331 (HH) 04/13/2021    AST 17 02/19/2024    AST 60 (H) 01/15/2024    AST 7 01/08/2024    ALT 7 02/19/2024    ALT 15 01/15/2024    ALT <5 01/08/2024    ALKPHOS 93 02/19/2024    ALKPHOS 102 01/15/2024    ALKPHOS 90 01/08/2024    BILITOTAL 0.9 02/19/2024    BILITOTAL 0.9 01/15/2024    BILITOTAL 0.3 01/08/2024    DAMIAN 16 01/16/2024    DAMIAN 17 11/06/2022    INR 1.37 (H) 02/19/2024    INR 1.53 (H) 01/08/2024    INR 1.15 09/12/2023        Results for orders placed or performed during the hospital encounter of 02/19/24   XR Chest Port 1 View    Impression    IMPRESSION: Small right pleural effusion. Sternotomy wires and AVR  staple line right lower lung. Mild right basilar infiltrate or  atelectasis, improved from previous. The left lung remains clear.    MARGARITA ADAMS MD         SYSTEM ID:  [id number]   Head CT w/o contrast    Impression    IMPRESSION:  1.  No CT evidence for acute intracranial process.  2.  Brain atrophy and presumed chronic microvascular ischemic changes as above.   CT Chest (PE) Abdomen Pelvis w Contrast    Impression    IMPRESSION:  1.  Significant interval increase in consolidative alveolar infiltrate throughout the right lower lobe. Greater patchy alveolar infiltrates in the left lower lobe obscuring prior pulmonary nodularity to the left lower lobe. Findings compatible with   progression of bilateral  pneumonia.    2.  Loculated pleural fluid posterior right costophrenic angle and posterior right upper hemithorax is increased since prior.    3.  No pulmonary embolism.     4.  Prior right lower lobe and left lower lobe pulmonary embolic disease has resolved.    5.  Normal-caliber aorta without dissection.    6.  Hepatic cirrhosis with unchanged hepatic cyst.       Medical Decision Making       Please see A&P for additional details of medical decision making.  MANAGEMENT DISCUSSED with the following over the past 24 hours: Dr Davis   NOTE(S)/MEDICAL RECORDS REVIEWED over the past 24 hours: ED, Medicine, ID, WOC, SW, Nursing  Tests REVIEWED in the past 24 hours:  - See lab/imaging results included in the data section of the note  SUPPLEMENTAL HISTORY, in addition to the patient's history, over the past 24 hours obtained from:   - Spouse or significant other      Much or all of the text in this note was generated through the use of Dragon dictation, voice to text software. Errors in spelling or words which appear to be out of context are unintentional and may be present due to having escaped editing.

## 2024-02-20 NOTE — ED NOTES
"[facility]  ED Nurse Handoff Report    ED Chief complaint: Altered Mental Status and Shortness of Breath (/)      ED Diagnosis:   Final diagnoses:   None       Code Status: To be determined by admitting provider.     Allergies:   Allergies   Allergen Reactions    Omeprazole Itching    Pantoprazole Itching    Prevacid [Lansoprazole] Itching    Lasix [Furosemide] Rash    Lidocaine Blisters and Rash     Allergy to lidocaine ointment  Allergy to lidocaine ointment      Penicillin G Rash    Penicillins Rash     \"broke out from injection\" 60 yrs ago  Tolerates cephalosporins       Patient Story:  BIBA from nursing home for evaluation of altered mental status and shortness of breath. EMS reports that the patient's family says the patient's mental status has been altered since the morning and the patient's confusion has been increasing. He cannot use his walker without falling which is how he normally gets around at baseline. His O2 saturation was 76% when EMS arrived. Fever of 103 in ED. Hx of lung cancer and atrial fibrillation on eliquis.     Focused Assessment:    Patient is A&O to self only. Able to answer yes or no questions only. Sentences don't make much sense. Infrequent dry cough and mild SOB. Breathing rate, rhythm and SPO2 WDL on 3L oxymask. Denies chest pain. HR tachy in low 100s. Afib.     Labs Ordered and Resulted from Time of ED Arrival to Time of ED Departure   COMPREHENSIVE METABOLIC PANEL - Abnormal       Result Value    Sodium 138      Potassium 3.9      Carbon Dioxide (CO2) 26      Anion Gap 13      Urea Nitrogen 19.4      Creatinine 1.51 (*)     GFR Estimate 43 (*)     Calcium 8.6      Chloride 99      Glucose 178 (*)     Alkaline Phosphatase 93      AST 17      ALT 7      Protein Total 6.8      Albumin 3.2 (*)     Bilirubin Total 0.9     PROCALCITONIN - Abnormal    Procalcitonin 21.39 (*)    TROPONIN T, HIGH SENSITIVITY - Abnormal    Troponin T, High Sensitivity 107 (*)    NT PROBNP " INPATIENT - Abnormal    N terminal Pro BNP Inpatient 30,280 (*)    ROUTINE UA WITH MICROSCOPIC REFLEX TO CULTURE - Abnormal    Color Urine Yellow      Appearance Urine Clear      Glucose Urine Negative      Bilirubin Urine Negative      Ketones Urine Trace (*)     Specific Gravity Urine 1.022      Blood Urine Trace (*)     pH Urine 6.0      Protein Albumin Urine 50 (*)     Urobilinogen Urine Normal      Nitrite Urine Negative      Leukocyte Esterase Urine Negative      Mucus Urine Present (*)     RBC Urine 3 (*)     WBC Urine 0     CBC WITH PLATELETS AND DIFFERENTIAL - Abnormal    WBC Count 17.1 (*)     RBC Count 3.86 (*)     Hemoglobin 10.0 (*)     Hematocrit 31.9 (*)     MCV 83      MCH 25.9 (*)     MCHC 31.3 (*)     RDW 17.7 (*)     Platelet Count 162      % Neutrophils 90      % Lymphocytes 2      % Monocytes 7      % Eosinophils 0      % Basophils 0      % Immature Granulocytes 1      NRBCs per 100 WBC 0      Absolute Neutrophils 15.5 (*)     Absolute Lymphocytes 0.4 (*)     Absolute Monocytes 1.2      Absolute Eosinophils 0.0      Absolute Basophils 0.0      Absolute Immature Granulocytes 0.1      Absolute NRBCs 0.0     ISTAT GASES LACTATE VENOUS POCT - Abnormal    Lactic Acid POCT 1.8      Bicarbonate Venous POCT 29 (*)     O2 Sat, Venous POCT 41 (*)     pCO2 Venous POCT 52 (*)     pH Venous POCT 7.35      pO2 Venous POCT 25     INR - Abnormal    INR 1.37 (*)    TROPONIN T, HIGH SENSITIVITY - Abnormal    Troponin T, High Sensitivity 90 (*)    MAGNESIUM - Normal    Magnesium 1.9     INFLUENZA A/B, RSV, & SARS-COV2 PCR - Normal    Influenza A PCR Negative      Influenza B PCR Negative      RSV PCR Negative      SARS CoV2 PCR Negative     BLOOD CULTURE   BLOOD CULTURE   MRSA MSSA PCR, NASAL SWAB       CT Chest (PE) Abdomen Pelvis w Contrast   Final Result   IMPRESSION:   1.  Significant interval increase in consolidative alveolar infiltrate throughout the right lower lobe. Greater patchy alveolar infiltrates in  the left lower lobe obscuring prior pulmonary nodularity to the left lower lobe. Findings compatible with    progression of bilateral pneumonia.      2.  Loculated pleural fluid posterior right costophrenic angle and posterior right upper hemithorax is increased since prior.      3.  No pulmonary embolism.       4.  Prior right lower lobe and left lower lobe pulmonary embolic disease has resolved.      5.  Normal-caliber aorta without dissection.      6.  Hepatic cirrhosis with unchanged hepatic cyst.      Head CT w/o contrast   Final Result   IMPRESSION:   1.  No CT evidence for acute intracranial process.   2.  Brain atrophy and presumed chronic microvascular ischemic changes as above.      XR Chest Port 1 View    (Results Pending)         Treatments and/or interventions provided:  Medications   vancomycin (VANCOCIN) 1,500 mg in 0.9% NaCl 250 mL intermittent infusion (1,500 mg Intravenous $New Bag 2/19/24 2236)   sodium chloride 0.9% BOLUS 1,000 mL (0 mLs Intravenous Stopped 2/19/24 2236)   acetaminophen (TYLENOL) Suppository 650 mg (650 mg Rectal $Given 2/19/24 2052)   ceFEPIme (MAXIPIME) 2 g vial to attach to  mL bag for ADULTS or 50 mL bag for PEDS (0 g Intravenous Stopped 2/19/24 2236)   iopamidol (ISOVUE-370) solution 70 mL (70 mLs Intravenous $Given 2/19/24 2223)   Saline (84 mLs Intravenous $Given 2/19/24 2223)       Patient's response to treatments and/or interventions:  Patient fever improved with tylenol. Pt remains stable.     To be done/followed up on inpatient unit:   See any in-patient orders. IV abx. Monitor for sepsis.     Does this patient have any cognitive concerns?: Disoriented to time, Disoriented to place, and Disoriented to situation    Activity level - Baseline/Home:    Walker    Activity Level - Current:    Total Care    Patient's Preferred language: English     Needed?: No    Isolation: None  Infection: Not Applicable  Patient tested for COVID 19 prior to admission:  YES    Bariatric?: No    Vital Signs:   Vitals:    02/19/24 2313 02/19/24 2328 02/19/24 2343 02/19/24 2358   BP: 111/71 114/67 112/75    Pulse: 106 112 103 103   Resp:    21   Temp:       TempSrc:       SpO2: 99% 97% 98% 99%       Cardiac Rhythm:     Was the PSS-3 completed:   Yes  What interventions are required if any?                 Family Comments: N/A    OBS brochure/video discussed/provided to patient/family: No              Name of person given brochure if not patient: N/A              Relationship to patient: N/A    For the majority of the shift this patient's behavior was Green.  Behavioral interventions performed were N/A.    ED NURSE PHONE NUMBER: *[phone]

## 2024-02-20 NOTE — ED PROVIDER NOTES
History     Chief Complaint:  Altered Mental Status and Shortness of Breath (/)       HPI   Hermilo Velasquez is a 89 year old male anticoagulated (Eliquis), with a history of lung cancer and atrial fibrillation, was BIBA to the emergency department for evaluation of altered mental status and shortness of breath. EMS reports that the patient's family says the patient's mental status has been altered since the morning and the patient's confusion has been increasing. He cannot use his walker without falling which is how he normally gets around at baseline. His O2 saturation was 76% when EMS arrived to the patient's nursing home where he lives. En route to the hospital, he was given 10L of oxygen which increased his oxygen saturation to 96%. The patient's temperature reached 103.6F while at the nursing home. His blood sugar was 164, and his heart rate was 110 per EMS.  Baseline O2 requirement is 3 L.        Independent Historian:   EMS as reported above in the HPI      Reviewed patient's prior medical records patient previously admitted for C. difficile colitis, altered mental status    Medications:    Eliquis   Albuterol   Lipitor   Cymbalta   Pepcid   Florinef  Neurontin  Floxin   Zofran   Flomax  Prevacid   Spiriva   vancomycin    Past Medical History:    Adenocarcinoma, lung, right  Aortic stenosis   Atrial fibrillation   COPD   DVT  GERD   Heart murmur   Need for subacute bacterial endocarditis prophylaxis   Neuropathy  Hyperlipidemia   Other malignant lymphomas  RBBB  Severe sepsis with acute organ dysfunction          Past Surgical History:    Appendectomy   Arthroplasty knee   Back surgery  EGD combined  Total knee arthroplasty  Hernia repair  Knee surgery  Lobectomy lung   Repair ligament ankle   Replace valve aortic   Rotator cuff repair   Spine surgery   Thoracotomy, wedge resection lung, combined  Tonsillectomy   TURP    Physical Exam   Patient Vitals for the past 24 hrs:   BP Temp Temp src Pulse Resp  SpO2   02/20/24 0000 114/75 -- -- 105 -- 99 %   02/19/24 2358 -- -- -- 103 21 99 %   02/19/24 2343 112/75 -- -- 103 -- 98 %   02/19/24 2328 114/67 -- -- 112 -- 97 %   02/19/24 2313 111/71 -- -- 106 -- 99 %   02/19/24 2258 106/62 -- -- 109 -- 100 %   02/19/24 2242 109/65 -- -- 101 -- 98 %   02/19/24 2238 -- 99.5  F (37.5  C) Oral -- -- --   02/19/24 2212 -- -- -- -- 23 --   02/19/24 2159 110/64 -- -- 102 26 97 %   02/19/24 2136 122/71 -- -- 109 21 99 %   02/19/24 2121 118/69 -- -- 114 -- --   02/19/24 2106 126/77 -- -- 106 -- 100 %   02/19/24 2105 -- -- -- 109 22 100 %   02/19/24 2103 -- (!) 103.5  F (39.7  C) Rectal -- -- --   02/19/24 2100 114/75 -- -- 116 -- 100 %   02/19/24 2055 121/64 -- -- 111 -- 100 %   02/19/24 2050 117/76 -- -- 116 24 100 %   02/19/24 2045 114/72 -- -- 101 30 100 %   02/19/24 2040 119/77 -- -- (!) 136 -- --        Physical Exam  Vitals reviewed.   Constitutional:       General: He is in acute distress.      Appearance: He is ill-appearing.      Comments: Patient is cachectic, ill-appearing and in acute respiratory distress.   HENT:      Head: Normocephalic and atraumatic.      Mouth/Throat:      Comments: Dry mucous membranes  Eyes:      Extraocular Movements: Extraocular movements intact.   Cardiovascular:      Rate and Rhythm: Tachycardia present. Rhythm irregular.   Pulmonary:      Effort: Pulmonary effort is normal. No respiratory distress.      Breath sounds: Wheezing and rales present.      Comments: Significant rales to lower bases  On arrival patient was brought in on 10 L, placed on O2 monitor and titrated down to 5 L  Abdominal:      Palpations: Abdomen is soft.      Tenderness: There is no abdominal tenderness. There is no guarding.   Musculoskeletal:      Cervical back: Normal range of motion.   Skin:     General: Skin is warm and dry.   Neurological:      Mental Status: He is alert.      Comments: Alert able to tell me his name.   Psychiatric:         Behavior: Behavior normal.            Emergency Department Course   ECG  ECG results from 02/19/24   EKG 12-lead, tracing only     Value    Systolic Blood Pressure     Diastolic Blood Pressure     Ventricular Rate 110    Atrial Rate     FL Interval     QRS Duration 144        QTc 441    P Axis     R AXIS -63    T Axis 91    Interpretation ECG      Atrial fibrillation with rapid ventricular response  Right bundle branch block  Left anterior fascicular block  ** Bifascicular block **  T wave abnormality, consider lateral ischemia  Abnormal ECG  When compared with ECG of 15-SINDI-2024 18:27,  QT has shortened  Read at 2017     *Note: Due to a large number of results and/or encounters for the requested time period, some results have not been displayed. A complete set of results can be found in Results Review.         Imaging:  CT Chest (PE) Abdomen Pelvis w Contrast   Final Result   IMPRESSION:   1.  Significant interval increase in consolidative alveolar infiltrate throughout the right lower lobe. Greater patchy alveolar infiltrates in the left lower lobe obscuring prior pulmonary nodularity to the left lower lobe. Findings compatible with    progression of bilateral pneumonia.      2.  Loculated pleural fluid posterior right costophrenic angle and posterior right upper hemithorax is increased since prior.      3.  No pulmonary embolism.       4.  Prior right lower lobe and left lower lobe pulmonary embolic disease has resolved.      5.  Normal-caliber aorta without dissection.      6.  Hepatic cirrhosis with unchanged hepatic cyst.      Head CT w/o contrast   Final Result   IMPRESSION:   1.  No CT evidence for acute intracranial process.   2.  Brain atrophy and presumed chronic microvascular ischemic changes as above.      XR Chest Port 1 View    (Results Pending)          Laboratory:  Labs Ordered and Resulted from Time of ED Arrival to Time of ED Departure   COMPREHENSIVE METABOLIC PANEL - Abnormal       Result Value    Sodium 138       Potassium 3.9      Carbon Dioxide (CO2) 26      Anion Gap 13      Urea Nitrogen 19.4      Creatinine 1.51 (*)     GFR Estimate 43 (*)     Calcium 8.6      Chloride 99      Glucose 178 (*)     Alkaline Phosphatase 93      AST 17      ALT 7      Protein Total 6.8      Albumin 3.2 (*)     Bilirubin Total 0.9     PROCALCITONIN - Abnormal    Procalcitonin 21.39 (*)    TROPONIN T, HIGH SENSITIVITY - Abnormal    Troponin T, High Sensitivity 107 (*)    NT PROBNP INPATIENT - Abnormal    N terminal Pro BNP Inpatient 30,280 (*)    ROUTINE UA WITH MICROSCOPIC REFLEX TO CULTURE - Abnormal    Color Urine Yellow      Appearance Urine Clear      Glucose Urine Negative      Bilirubin Urine Negative      Ketones Urine Trace (*)     Specific Gravity Urine 1.022      Blood Urine Trace (*)     pH Urine 6.0      Protein Albumin Urine 50 (*)     Urobilinogen Urine Normal      Nitrite Urine Negative      Leukocyte Esterase Urine Negative      Mucus Urine Present (*)     RBC Urine 3 (*)     WBC Urine 0     CBC WITH PLATELETS AND DIFFERENTIAL - Abnormal    WBC Count 17.1 (*)     RBC Count 3.86 (*)     Hemoglobin 10.0 (*)     Hematocrit 31.9 (*)     MCV 83      MCH 25.9 (*)     MCHC 31.3 (*)     RDW 17.7 (*)     Platelet Count 162      % Neutrophils 90      % Lymphocytes 2      % Monocytes 7      % Eosinophils 0      % Basophils 0      % Immature Granulocytes 1      NRBCs per 100 WBC 0      Absolute Neutrophils 15.5 (*)     Absolute Lymphocytes 0.4 (*)     Absolute Monocytes 1.2      Absolute Eosinophils 0.0      Absolute Basophils 0.0      Absolute Immature Granulocytes 0.1      Absolute NRBCs 0.0     ISTAT GASES LACTATE VENOUS POCT - Abnormal    Lactic Acid POCT 1.8      Bicarbonate Venous POCT 29 (*)     O2 Sat, Venous POCT 41 (*)     pCO2 Venous POCT 52 (*)     pH Venous POCT 7.35      pO2 Venous POCT 25     INR - Abnormal    INR 1.37 (*)    TROPONIN T, HIGH SENSITIVITY - Abnormal    Troponin T, High Sensitivity 90 (*)    MAGNESIUM -  Normal    Magnesium 1.9     INFLUENZA A/B, RSV, & SARS-COV2 PCR - Normal    Influenza A PCR Negative      Influenza B PCR Negative      RSV PCR Negative      SARS CoV2 PCR Negative     BLOOD CULTURE   BLOOD CULTURE   MRSA MSSA PCR, NASAL SWAB        Procedures       Emergency Department Course & Assessments:             Interventions:  Medications   sodium chloride 0.9% BOLUS 1,000 mL (0 mLs Intravenous Stopped 24)   acetaminophen (TYLENOL) Suppository 650 mg (650 mg Rectal $Given 24)   ceFEPIme (MAXIPIME) 2 g vial to attach to  mL bag for ADULTS or 50 mL bag for PEDS (0 g Intravenous Stopped 24)   vancomycin (VANCOCIN) 1,500 mg in 0.9% NaCl 250 mL intermittent infusion (0 mg Intravenous Stopped 24)   iopamidol (ISOVUE-370) solution 70 mL (70 mLs Intravenous $Given 24)   Saline (84 mLs Intravenous $Given 24)        Assessments:        I obtained history and examined the patient as noted above.     ED Course as of 24   2100 Lactic Acid POCT: 1.8   2100 O2 Sat, Venous POCT(!): 41   2129 WBC(!): 17.1   2218 N-Terminal Pro BNP Inpatient(!): 30,280   2218 Right-sided opacity noted on x-ray.   2243 Pt currently on 5L oxymask, pending CT results        Social Determinants of Health affecting care:   None    Disposition:  The patient was admitted to the hospital under the care of Dr. Muñiz.     Impression & Plan      Medical Decision Makin-year-old male presenting today with increased shortness of breath, altered mental status.  He is on 3 L nasal cannula baseline.  Noted to have significant hypoxia by EMS requiring 10 L.  Patient alert, oriented only to person on arrival.  Noted to have Rales to the bilateral bases.  He was initially placed on 10 L oxy mask and weaned down to 5 L.  O2 saturations well-maintained.  He was febrile at 103, blood cultures, lactic acid ordered.  Lactic acid was 1.8 within normal range.   White count of 17.  He was started on broad-spectrum antibiotics suspected pneumonia.  His BNP was found to be 30,000 given these findings IV fluids were withheld.  He was given a dose of Tylenol for his fever of 103 which had improved while in the ER.  His troponin was found to be elevated but suspect demand ischemia.  Repeat troponin down trended.  He had a CT of the head, CTA PE study of the lungs given his history of pulmonary embolism.  No PE seen.  CT of the head showed no intracranial bleed.  Patient was hemodynamically stable on 5 L.  Resting comfortably on reassessments.  He was admitted to hospitalist service for sepsis due to bilateral pneumonia.    Critical Care time was 30 minutes for this patient excluding procedures.    Diagnosis:    ICD-10-CM    1. Sepsis, due to unspecified organism, unspecified whether acute organ dysfunction present (H)  A41.9       2. Pneumonia of both lower lobes due to infectious organism  J18.9       3. Hypoxia  R09.02                Scribe Disclosure:  I, Sonny Suresh, am serving as a scribe at 8:51 PM on 2/19/2024 to document services personally performed by Lala Liu DO based on my observations and the provider's statements to me.   2/19/2024   No att. providers found        Lala Liu DO  02/20/24 0040

## 2024-02-20 NOTE — PROGRESS NOTES
Clinic Care Coordination Contact  Ambulatory Care Coordination to Inpatient Care Management   Hand-In Communication    Date:  February 20, 2024  Name: Hermilo Velasquez is enrolled in Ambulatory Care Coordination program and I am the Lead Care Coordinator.  CC Contact Information: Epic InMeal [employer] + phone  Payor Source: Payor: [facility] / Plan: [facility] MEDICARE / Product Type: HMO /   Current services in place:     Please see the CC Snaphot and Care Management Flowsheets for specific details of this Hermilo Velasquez care plan.   Additional details/specific concerns r/t this admission:    No additional concerns at this time      I will follow this admission in Epic. Please feel free to contact me with questions or for further collaboration in discharge planning.    Louisa Shelley,  [facility]  Clinic Care Coordinator  [facility]  [phone]  [email or web address]

## 2024-02-20 NOTE — PLAN OF CARE
Goal Outcome Evaluation:           Overall Patient Progress: no changeOverall Patient Progress: no change         A&O to self. Slightly tachy to low 100s and mild temp of, otherwise VSS on 3L oxymask. Tele: Afib cvr/rvr. Lift assist. Soft/ bitesize diet, No PO intake since admission. PIV to R forearm, SL. Pt experiencing AMS and intermittent SOB. Denies pain. Incontinent of B&B, ext cath in place. Some minore leaking from rectum of watery stool, but no proper BM this shift. Continue plan of care.

## 2024-02-20 NOTE — DISCHARGE INSTRUCTIONS
"Wound cares  Location: buttock   Care: primary RN   1. Cleanse under mepilex daily with normal saline and 4x4\" gauze, pat dry   2. Reapply same mepilex sacral size, ok to use each up to 5 days, ok to lift for routine assessments   3. Continue to use incontinence cleanser to perianal with soft dry wipes every 2 hours with routine turns   "

## 2024-02-20 NOTE — ED NOTES
Bed: ST03  Expected date:   Expected time:   Means of arrival:   Comments:  [place] 89M fromhome, altered, oriented at baseline, usually in 3L of O2, now on 10

## 2024-02-21 NOTE — PROGRESS NOTES
[facility]    Medicine Progress Note - Hospitalist Service    Date of Admission:  2/19/2024    Assessment & Plan     Hermilo Velasquez is a 89 year old male with a history of lung cancer s/p wedge resection, recurrent c.diff, afib, COPD, PE, aortic stenosis s/p bioprosthetic heart valve, PATIENCE, CKD who is admitted on 2/19/2024 with worsening cough and increasing O2 needs with CT showing bilateral pneumonia.      Sepsis due to bilateral pneumonia  Loculated effusion of RLL, likely parapneumonic  CT shows increased consolidation of the RLL and new patchy infiltrates in the LLL concerning for progressive bilateral pneumonia. Also noted is loculated effusion on the right, progressed from prior. With increased O2 needs, new leukocytosis, procal of 21 and fever this is likely infectious source. Note history of dysphagia so aspiration pneumonia is a possibility though patient notes stability in his swallowing/eating.   -Cefepime and vancomycin IV initiated on admission, ID consulted, MRSA PCR negative, vancomycin IV discontinued, on cefepime and flagyl added.   -Reviewed with thoracic surgery and s/p thoracentesis. Pleural Fluid clotted off and cell count/differential could not be completed.  Gram stain without organisms and culture negative so far, follow.  -SLP eval, plan is VFSS.  Meanwhile, on level 6 diet with mildly thick liquid.  -wean O2 as able     Recurrent c diff colitis  diarrhea reported on January 14 prior to last admission admission. Previously recommended that the patient follow up at [facility] for fecal transplantation, but he has not. ID note from 11/28/23 had recommended a prolonged taper of vancomycin. Previously he had been recommended to continue po vancomycin indefinitely until fecal transplant. *oral vancomycin was discontinued during his last admission in January due to lack of diarrhea. Vanco restarted during last admission.   - Continue vanco PO (refusing at times) twice  daily. Patient to follow up with [facility] for fecal transplant.     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 Head CT in the ED on 2/19 negative. Also noted to have significant cognitive deficits and memory issues per previous hospital stay.   -Treating sepsis as above.  - PRN zyprexa     Dysphagia  -continue dysphagia diet  -speech consult as above.     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 Planned for pet scan and biopsy of nodule on 1/9 was cancelled by the patient  - Outpatient follow-up with oncology.  -Follow cytology on pleural fluid     FDG avid soft tissue nodule in the right tongue base  Concern for squamous cell carcinoma.   - Appointment with ENT on 1/11 was cancelled to work up the lung first.  - Wife planned to make appointment with ENT after most recent admission. Will discuss after meeting 2/22.     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     COPD   Chronic hypoxic respiratory failure on 3L O2  Does not have significant wheezing, do not think he has COPD exacerbation at this time  -continue PTA inhalers  -wean O2 as above  -treat for pneumonia as above     Hx of VTE/PE  History of subsegmental PE on 11/25/23  - Not on ac due to gi bleed. Hx of IVC filter in the past, not clear if it was removed.     Paroxysmal Atrial fibrillation  Not on anticoagulation due to anemia thought to be due to a GI bleed. Has history of angioextasia and diverticulosis. Tachy on admission, driven by infection.   -Tachycardia has improved, continue to monitor on telemetry       Anemia  Thrombocytopenia  Chronic anemia, stable at baseline 9  Drop in Platelet count suspect  related to sepsis. Patient runs low intermittently, monitor    Severe aortic stenosis s/p bioprosthetic aortic valve  HTN  HLD  Elevated BNP  Chronically elevated troponin  Hx of orthostatic hypotension  Troponin initially 107, 90 on recheck. Both troponin and BNP (30k) higher than previous. Suspect due to sepsis.   - Telemetry, repeat TTE pending  - remains on florinef for hypotension     BPH  - Continue PTA Flomax.     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   -Creatinine improved, follow     Depression/Anxiety/Pain  - Continue PTA Duloxetine and gabapentin     Deep Tissue Pressure Injury to sacrum and bilateral buttocks,POA  -WOC consulted        Diet: Combination Diet Regular Diet Adult; Soft and Bite Sized Diet (level 6); Thin Liquids (level 0)    DVT Prophylaxis: Pneumatic Compression Devices  Suazo Catheter: Not present  Lines: None     Cardiac Monitoring: ACTIVE order. Indication: Tachyarrhythmias, acute (48 hours)  Code Status: Full Code      Clinically Significant Risk Factors          # Hypocalcemia: Lowest Ca = 8 mg/dL in last 2 days, will monitor and replace as appropriate     # Hypoalbuminemia: Lowest albumin = 3.2 g/dL at 2/19/2024  8:54 PM, will monitor as appropriate  # Coagulation Defect: INR = 1.37 (Ref range: 0.85 - 1.15) and/or PTT = N/A, will monitor for bleeding    # Hypertension: Noted on problem list  # Chronic heart failure with preserved ejection fraction: heart failure noted on problem list and last echo with EF >50%           # Financial/Environmental Concerns:    # COPD: noted on problem list        Disposition Plan  anticipate several days pending clinical course.  Meeting with family 2/22 to discuss goals of care. Discussed with patient/RN/patient's wife.      Expected Discharge Date: 02/22/2024                    Areli Davis,  MD  Hospitalist [facility]  Securely message with [employer] (more info)  Text page via [employer] Paging/Directory   ______________________________________________________________________    Interval History   Patient was seen this morning, he is more alert today and answering question.  More alert.  Was calm but per nursing staff has been agitated intermittently especially since trying to place the IV line.    Physical Exam   Vital Signs: Temp: 97.7  F (36.5  C) Temp src: Oral BP: 135/88 Pulse: 115   Resp: 18 SpO2: 95 % O2 Device: Nasal cannula Oxygen Delivery: 3 LPM  Weight: 0 lbs 0 oz    General: AAOxself, place month and yeat, appears comfortable. Calm in am, agitated per RN while attempting IV placement  HEENT: PERRLA EOMI. Mucosa moist.   Lungs: Bilateral equal air entry. Coarse breath sounds Rt base, normal work of breathing.   CVS: S1S2 regular, no tachycardia or murmur.   Abdomen: Soft, NT, ND. BS heard.  MSK: No edema or deformities.  Neuro: AAOX2-3. CN 2-12 normal. Strength symmetrical.  Skin: No rash.       Medical Decision Making       52 MINUTES SPENT BY ME on the date of service doing chart review, history, exam, documentation & further activities per the note.      Data     I have personally reviewed the following data over the past 24 hrs:    14.6 (H)  \   9.9 (L)   / 120 (L)     141 106 23.6 (H) /  120 (H)   3.6 28 1.01 \     Procal: N/A CRP: N/A Lactic Acid: 1.8         Imaging results reviewed over the past 24 hrs:   Recent Results (from the past 24 hour(s))   Echo Limited    Narrative    [id number]  [id number]  [id number]  [id number]^[email or web address]^VERONIKA     [facility]  [facility]  Echocardiography Laboratory  [address]  [address]  [place] [address]  Phone [phone]  Fax [phone]     Name: CLYDE RODRIGUEZ  MRN: [id number]  : 1932  Study Date: 2024 10:51 AM  Age: 89 yrs  Gender: Male  Patient Location:  [facility]  Reason For Study: CHF  Ordering Physician: VERONIKA HU  Referring Physician: VERONIKA HU  Performed By: [facility] Tiana Holland     BSA: 1.8 m2  Height: 70 in  Weight: 138 lb  HR: 89  BP: 113/68 mmHg  ______________________________________________________________________________  Procedure  (Suboptimal images, abbreviated study performed).     ______________________________________________________________________________  Interpretation Summary     Technically limited study due to patient intolerance of the study.     The left ventricle is not well visualized. Due to the poor quality of the  echocardiogram, an assessment of left ventricular ejection fraction cannot be  made.  Right ventricle is not well visualized, however global systolic function is  probably at least mildly reduced.  No obvious pericardial effusion.  Status post aortic valve replacement with a 25-mm St. Arsen Trifecta  bioprosthetic valve. Valve not well visualized. Doppler evaluation suboptimal  for assessment.  The patient exhibited frequent PVCs.  ______________________________________________________________________________  Left Ventricle  The left ventricle is not well visualized. Due to the poor quality of the  echocardiogram, an assessment of left ventricular ejection fraction cannot be  made.     Right Ventricle  The right ventricle is not well visualized. Right ventricle is not well  visualized, however global systolic function is probably at least mildly  reduced.     Mitral Valve  There is moderate mitral annular calcification.     Tricuspid Valve  The tricuspid valve is not well visualized, but is grossly normal.     Aortic Valve  There is a bioprosthetic aortic valve. Status post aortic valve replacement  with a 25-mm St. Arsen Trifecta bioprosthetic valve. Valve not well visualized.  Doppler evaluation suboptimal for assessment.     Pericardium  No obvious pericardial effusion.     Rhythm  The patient exhibited frequent  PVCs.  ______________________________________________________________________________  MMode/2D Measurements & Calculations  IVSd: 0.98 cm  LVIDd: 5.3 cm  LVIDs: 4.7 cm  LVPWd: 0.92 cm  FS: 12.2 %  LV mass(C)d: 188.2 grams  LV mass(C)dI: 105.6 grams/m2  Ao root diam: 3.0 cm  LVOT diam: 2.1 cm  LVOT area: 3.3 cm2  Ao root diam index Ht(cm/m): 1.7  Ao root diam index BSA (cm/m2): 1.7  RWT: 0.35     ______________________________________________________________________________  Report approved by: Calli Leigh 02/21/2024 04:00 PM

## 2024-02-21 NOTE — PROGRESS NOTES
"A&O to self only. VSS except for tachycardia. Oxygen on at 3 liters per NC. Writer entered room and observed the Pt's oxygen off. Pt states \"I can't breath.\" Lung sounds congested, scheduled Neb treatment given.  . Tele - Atrial Fibrillation. Occasionally tries to get out of bed. Takes medications crushed with applesauce. Regular diet modified texture, soft and bite sized. Thin liquids. Incontinent of B&B. Needs assistance of 2 with lift. Family meeting with Palliative care 2/22/24.   "

## 2024-02-21 NOTE — PROGRESS NOTES
[place] [facility] [place] 1730 [place]. Confused. Alert to self and place.  Garbled speech. VSS. 3L Oxygen via N/C. Infrequent dry cough noted this evening. No bm during shift. Pills crushed in apple sauce. No straws. Dysphagia diet.

## 2024-02-21 NOTE — PHARMACY-ADMISSION MEDICATION HISTORY
"Pharmacist Admission Medication History    Admission medication history is complete. The information provided in this note is only as accurate as the sources available at the time of the update.    Information Source(s): Family member, Caregiver, and CareEverywhere/SureScripts via phone  --> reviewed fill history  --> reviewed discharge summary note from [facility] 2/14/24  --> patient alert & oriented to self only; called and spoke with spouse. Spouse states at baseline, patient managed own medications. Spouse read off home copy of patient's medication list but admitted that \"the list is goofy\" and \"some medications are crossed off\". Spouse confirmed home care services set up medications on 2/19/24 prior to presentation to ED.   --> called [facility] and requested copy of medication list as of 2/19/24 visit  --> last doses overall not known, spouse states no medications were taken 2/19/24 PTA    Pertinent Information:   Medications spouse states patient taking, not on TCU discharge summary or [facility] list: tums  Medications spouse states patient taking, on TCU discharge summary but not on [facility] list: ondansetron PRN, vancomycin PO, albuterol PRN  Medications noted differently on TCU discharge summary vs [facility] list: acetaminophen ([facility] list does not specify PRN frequency), ofloxacin (10 drops on TCU discharge summary, 1 drop on [facility] list) - did not adjust either at this time    Spouse confirms patient still has home supply of ofloxacin - prescribed for 7 days on TCU discharge (ended 2/20/24 per order), but [facility] list states to take until supply is gone. Spouse also stated patient has continued ear pain.   Spouse confirms patient still taking vancomycin - states patient is \"almost ready to complete it\", spouse estimates ~2d remaining of home supply.     Medications with recent fill history not on PTA med list below: Eliquis, hydroxyzine. Spouse confirms patient no longer taking " Eliquis, but thinks patient is taking hydroxyzine. Did not add to PTA med list as this medication was not on TCU discharge summary medication list or [facility] medication list.     Changes made to PTA medication list:  Added: tums  Deleted: None  Changed: re-added ofloxacin drops (Rx stated ended 2/20/24, spouse and [facility] list indicate patient still taking), clarified vancomycin PO dosing    Allergies reviewed with patient and updates made in EHR: yes    Medication History Completed By: Louisa Parnell [facility] 2/21/2024 9:39 AM    PTA Med List   Medication Sig Last Dose    acetaminophen (TYLENOL) 500 MG tablet Take 1,000 mg by mouth 3 times daily as needed Unknown at PRN    albuterol (PROAIR HFA/PROVENTIL HFA/VENTOLIN HFA) 108 (90 Base) MCG/ACT inhaler Inhale 2 puffs into the lungs every 6 hours as needed Unknown at PRN    atorvastatin (LIPITOR) 10 MG tablet Take 1 tablet (10 mg) by mouth daily Unknown at unknown time    calcium carbonate (TUMS) 500 MG chewable tablet Take 1 chew tab by mouth 2 times daily as needed for heartburn Unknown at PRN    DULoxetine (CYMBALTA) 60 MG capsule Take 60 mg by mouth daily Unknown at unknown time    famotidine (PEPCID) 40 MG tablet Take 1 tablet (40 mg) by mouth 2 times daily Unknown at unknown time    ferrous sulfate (FE TABS) 325 (65 Fe) MG EC tablet Take 1 tablet (325 mg) by mouth 2 times daily Unknown at unknown time    fludrocortisone (FLORINEF) 0.1 MG tablet Take 1 tablet (0.1 mg) by mouth daily Unknown at unknown time    gabapentin (NEURONTIN) 300 MG capsule Take 2 capsules (600 mg) by mouth At Bedtime For neuropathy pain Unknown at PM    ofloxacin (FLOXIN) 0.3 % otic solution Place 10 drops Into the left ear 2 times daily Take until supply is gone Unknown at unknown time    ondansetron (ZOFRAN ODT) 4 MG ODT tab Take 1 tablet (4 mg) by mouth every 6 hours as needed for nausea Unknown at PRN    potassium chloride ER (KLOR-CON M) 20 MEQ CR tablet Take 20 mEq by mouth  daily Unknown at unknown time    tamsulosin (FLOMAX) 0.4 MG capsule Take 1 capsule (0.4 mg) by mouth daily Unknown at unknown time    tiotropium (SPIRIVA) 18 MCG inhaled capsule Inhale 18 mcg into the lungs daily Unknown at unknown time    vancomycin (FIRVANQ) 50 MG/ML oral solution Take 2.5 mLs (125 mg) by mouth daily Takes 4 times daily x 10 days original start date 01/15. Continue oral taper once complete (Patient taking differently: Take 125 mg by mouth daily Continue until follow up with GI) Unknown at unknown time

## 2024-02-21 NOTE — PROVIDER NOTIFICATION
Lavern messaged Dr. Davis patient refusing PO vancomycin. Multiple attempts were made to give medication. Concealed in beverage, different approaches etc. patient refrains from accepting any PO intake.

## 2024-02-21 NOTE — PROVIDER NOTIFICATION
Paged Dr. Davis patient needs a new iv but is agitated during attempts can we please get a one time order for im zyprexa.

## 2024-02-21 NOTE — PROGRESS NOTES
Pt Confused, Alet to self,  A2 w/ Lift,  VSS on 3L of 02 NC, Pills crashed w/ apple sauce,Garbled speech,  incontinent of B$B, Enteric precaution maintained, on Tele AFIB,No BM this shift  continue plan of care.

## 2024-02-21 NOTE — PROGRESS NOTES
[facility]    Infectious Disease Progress Note    Date of Service (when I saw the patient): 02/21/2024     Assessment & Plan   Hermilo Velasquez is a 89 year old who was admitted on 2/19/2024.     Impression:  89 yo with a history of lung cancer s/p wedge resection  History of recurrent c.diff  Afib  COPD, PE  Aortic stenosis s/p bioprosthetic heart valve  CKD .   Admitted with cough, shortness of breath, fever  On IV vanco and cefepime and also on oral vanco   PCN listed as childhood allergy      Recommendations:   Continue on oral vanco, seems like was on some sort of taper for a while now, make the does BID for prophylaxis last positive C diff was 2 months ago   Continue IV flagyl and continue on IV cefepime   Loculated pleural effusion s/p thora cultures are penidng Obtain sputum cultures          Venkatesh Rhodes MD    Interval History   Tolerating antibiotics ok   No new rashes or issues with antibiotics   Labs reviewed   No changes to past medical, social or family history   Noted goals of care conversation       Physical Exam   Temp: 97.7  F (36.5  C) Temp src: Oral BP: 135/88 Pulse: 115   Resp: 18 SpO2: 95 % O2 Device: Nasal cannula Oxygen Delivery: 3 LPM  There were no vitals filed for this visit.  Vital Signs with Ranges  Temp:  [97.7  F (36.5  C)-98.9  F (37.2  C)] 97.7  F (36.5  C)  Pulse:  [102-117] 115  Resp:  [18-24] 18  BP: (106-135)/(71-88) 135/88  SpO2:  [91 %-100 %] 95 %    Constitutional: on 3 L O2s  Lungs: Coarse bilateral   Cardiovascular: irregular  Abdomen:  soft, non-distended, non-tender  Skin: No rashes, no cyanosis, no edema  Other:    Medications      ceFEPIme  2 g Intravenous Q12H    ipratropium - albuterol 0.5 mg/2.5 mg/3 mL  3 mL Nebulization 4x Daily    metroNIDAZOLE  500 mg Intravenous Q12H    sodium chloride (PF)  3 mL Intracatheter Q8H    vancomycin  125 mg Oral Q6H       Data   All microbiology laboratory data reviewed.  Recent Labs   Lab Test  02/21/24  0814 02/20/24  0651 02/19/24 2054   WBC 14.6* 21.3* 17.1*   HGB 9.9* 9.3* 10.0*   HCT 31.5* 29.3* 31.9*   MCV 84 83 83   * 132* 162     Recent Labs   Lab Test 02/21/24  0814 02/20/24  0651 02/19/24 2054   CR 1.01 1.36* 1.51*     Recent Labs   Lab Test 10/06/22  1526   SED 43*     Recent Labs   Lab Test 04/14/21  0815 04/13/21 2054 04/13/21 2038 09/13/20  1329 09/13/20  1256 08/15/20  1451 03/22/20  1950 03/22/20  1941 03/22/20  1935   CULT Canceled, Test credited  >10 Squamous epithelial cells/low power field indicates oral contamination. Please   recollect.  *  Notification of test cancellation was given to  Elva Ospina RN 1139 4/14/20 by AM   No growth No growth No growth No growth No growth  No growth No growth Cultured on the 4th day of incubation:  Staphylococcus capitis  Identification obtained by MALDI-TOF mass spectrometry research use only database. Test   characteristics determined and verified by the Infect[facility] Diseases Diagnostic Laboratory   ([facility]) [place], [place].  *  Critical Value/Significant Value, preliminary result only, called to and read back by  ANNABELLE CABRERA RN  03.26.20 CF    (Note)  POSITIVE for Staphylococci other than S.aureus, S.epidermidis and  S.lugdunensis, by Swapferitigene multiplex nucleic acid test.  Coagulase-negative staphylococci are the most common venipuncture or  collection associated skin CONTAMINANTS grown in blood cultures.  Final identification and antimicrobial susceptibility testing will be  verified by standard methods.    Specimen tested with Verigene multiplex, gram-positive blood culture  nucleic acid test for the following targets: Staph aureus, Staph  epidermidis, Staph lugdunensis, other Staph species, Enterococcus  faecalis, Enterococcus faecium, Streptococcus species, S. agalactiae,  S. anginosus grp., S. pneumoniae, S. pyogenes, Listeria sp., mecA  (methicillin resistance) and Samanta/B (vancomycin resistance).    Critical  Value/Significant Value called to and read back by Doreen Silverman RN on 3.26.20 at 0727. bw   No growth       All cultures:  Recent Labs   Lab 02/19/24 2105 02/19/24 2054   CULTURE No growth after 1 day No growth after 1 day      Blood culture:  Results for orders placed or performed during the hospital encounter of 02/19/24   Blood Culture Peripheral Blood    Specimen: Peripheral Blood   Result Value Ref Range    Culture No growth after 1 day    Blood Culture Peripheral Blood    Specimen: Peripheral Blood   Result Value Ref Range    Culture No growth after 1 day    Results for orders placed or performed during the hospital encounter of 11/25/23   Blood Culture Peripheral Blood    Specimen: Peripheral Blood   Result Value Ref Range    Culture No Growth    Blood Culture Peripheral Blood    Specimen: Peripheral Blood   Result Value Ref Range    Culture No Growth    Results for orders placed or performed during the hospital encounter of 09/12/23   Blood Culture Peripheral Blood    Specimen: Peripheral Blood   Result Value Ref Range    Culture No Growth    Blood Culture Peripheral Blood    Specimen: Peripheral Blood   Result Value Ref Range    Culture No Growth    Results for orders placed or performed during the hospital encounter of 11/06/22   Blood Culture Wrist, Left    Specimen: Wrist, Left; Blood   Result Value Ref Range    Culture No Growth    Blood Culture Peripheral Blood    Specimen: Peripheral Blood   Result Value Ref Range    Culture No Growth    Results for orders placed or performed during the hospital encounter of 10/09/22   Blood Culture Peripheral Blood    Specimen: Peripheral Blood   Result Value Ref Range    Culture No Growth    Blood Culture Peripheral Blood    Specimen: Peripheral Blood   Result Value Ref Range    Culture No Growth    Results for orders placed or performed during the hospital encounter of 04/10/22   Blood Culture Line, venous    Specimen: Line, venous; Blood   Result Value Ref  Range    Culture No Growth    Blood Culture Peripheral Blood    Specimen: Peripheral Blood   Result Value Ref Range    Culture No Growth    Results for orders placed or performed during the hospital encounter of 04/13/21   Blood culture    Specimen: Blood    Left Arm   Result Value Ref Range    Specimen Description Blood Left Arm     Culture Micro No growth    Blood culture    Specimen: Blood    Right Arm   Result Value Ref Range    Specimen Description Blood Right Arm     Culture Micro No growth    Results for orders placed or performed during the hospital encounter of 09/13/20   Blood culture    Specimen: Blood    Right Arm   Result Value Ref Range    Specimen Description Blood Right Arm     Culture Micro No growth    Blood culture    Specimen: Blood    Left Arm   Result Value Ref Range    Specimen Description Blood Left Arm     Culture Micro No growth    Results for orders placed or performed during the hospital encounter of 08/15/20   Blood culture    Specimen: Blood    Left Arm   Result Value Ref Range    Specimen Description Blood Left Arm     Culture Micro No growth    Blood culture    Specimen: Blood    Right Arm   Result Value Ref Range    Specimen Description Blood Right Arm     Culture Micro No growth    Results for orders placed or performed during the hospital encounter of 03/22/20   Blood culture    Specimen: Blood    Right Arm   Result Value Ref Range    Specimen Description Blood Right Arm     Culture Micro (A)      Cultured on the 4th day of incubation:  Staphylococcus capitis  Identification obtained by MALDI-TOF mass spectrometry research use only database. Test   characteristics determined and verified by the Infect[facility] Diseases Diagnostic Laboratory   ([facility]) [place], [place].      Culture Micro       Critical Value/Significant Value, preliminary result only, called to and read back by  ANNABELLE CABRERA RN  03.26.20 CF      Culture Micro       (Note)  POSITIVE for Staphylococci other than  S.aureus, S.epidermidis and  S.lugdunensis, by Verigene multiplex nucleic acid test.  Coagulase-negative staphylococci are the most common venipuncture or  collection associated skin CONTAMINANTS grown in blood cultures.  Final identification and antimicrobial susceptibility testing will be  verified by standard methods.    Specimen tested with Verigene multiplex, gram-positive blood culture  nucleic acid test for the following targets: Staph aureus, Staph  epidermidis, Staph lugdunensis, other Staph species, Enterococcus  faecalis, Enterococcus faecium, Streptococcus species, S. agalactiae,  S. anginosus grp., S. pneumoniae, S. pyogenes, Listeria sp., mecA  (methicillin resistance) and Samanta/B (vancomycin resistance).    Critical Value/Significant Value called to and read back by Doreen Silverman RN on 3.26.20 at 0719. bw         Susceptibility    Staphylococcus capitis - SOCRATES     Ciprofloxacin <=0.5 Susceptible ug/mL     Clindamycin <=0.25 Susceptible ug/mL     Erythromycin 0.5 Susceptible ug/mL     Gentamicin <=0.5 Susceptible ug/mL     Levofloxacin 0.5 Susceptible ug/mL     Oxacillin <=0.25 Susceptible ug/mL     Tetracycline <=1 Susceptible ug/mL     Vancomycin <=0.5 Susceptible ug/mL   Blood culture    Specimen: Blood    Left Arm   Result Value Ref Range    Specimen Description Blood Left Arm     Culture Micro No growth    Results for orders placed or performed during the hospital encounter of 12/22/19   Blood culture    Specimen: Blood    Right Arm   Result Value Ref Range    Specimen Description Blood Right Arm     Special Requests Aerobic and anaerobic bottles received     Culture Micro (A)      Cultured on the 2nd day of incubation:  Staphylococcus hominis      Culture Micro       Critical Value/Significant Value, preliminary result only, called to and read back by  DAVID GARCIA RN @1019 12/24/19. SCG      Culture Micro       (Note)  POSITIVE for Staphylococci other than S.aureus, S.epidermidis  and  S.lugdunensis, by Verigene multiplex nucleic acid test.  Coagulase-negative staphylococci are the most common venipuncture or  collection associated skin CONTAMINANTS grown in blood cultures.  Final identification and antimicrobial susceptibility testing will be  verified by standard methods.    Specimen tested with Verigene multiplex, gram-positive blood culture  nucleic acid test for the following targets: Staph aureus, Staph  epidermidis, Staph lugdunensis, other Staph species, Enterococcus  faecalis, Enterococcus faecium, Streptococcus species, S. agalactiae,  S. anginosus grp., S. pneumoniae, S. pyogenes, Listeria sp., mecA  (methicillin resistance) and Samanta/B (vancomycin resistance).    Critical Value/Significant Value called to and read back by Aurora Giron RN at 1305 12.24.19.DK         Susceptibility    Staphylococcus hominis - SOCRATES     Ciprofloxacin <=0.5 Susceptible ug/mL     Clindamycin* <=0.25 Susceptible ug/mL      * This isolate DOES NOT demonstrate inducible clindamycin resistance in vitro. Clindamycin is susceptible and could be used when indicated, however, erythromycin is resistant and should not be used.     Erythromycin >=8 Resistant ug/mL     Gentamicin <=0.5 Susceptible ug/mL     Levofloxacin <=0.12 Susceptible ug/mL     Oxacillin <=0.25 Susceptible ug/mL     Tetracycline 2 Susceptible ug/mL     Vancomycin <=0.5 Susceptible ug/mL   Blood culture    Specimen: Blood    Right Hand   Result Value Ref Range    Specimen Description Blood Right Hand     Special Requests Aerobic and anaerobic bottles received     Culture Micro No growth    Results for orders placed or performed during the hospital encounter of 05/13/19   Blood culture    Specimen: Arm, Left; Blood    Left Arm   Result Value Ref Range    Specimen Description Blood Left Arm     Special Requests Aerobic and anaerobic bottles received     Culture Micro No growth    Blood culture    Specimen: Arm, Left; Blood    Left Arm   Result  Value Ref Range    Specimen Description Blood Left Arm     Special Requests Aerobic and anaerobic bottles received     Culture Micro No growth      *Note: Due to a large number of results and/or encounters for the requested time period, some results have not been displayed. A complete set of results can be found in Results Review.      Urine culture:  Results for orders placed or performed during the hospital encounter of 01/15/24   Urine Culture    Specimen: Urine, Clean Catch   Result Value Ref Range    Culture <10,000 CFU/mL Mixture of Urogenital Sylvia    Results for orders placed or performed during the hospital encounter of 10/09/22   Urine Culture    Specimen: Urine, Midstream   Result Value Ref Range    Culture No Growth    Results for orders placed or performed during the hospital encounter of 04/10/22   Urine Culture    Specimen: Urine, Catheter   Result Value Ref Range    Culture No Growth    Results for orders placed or performed during the hospital encounter of 03/22/20   Urine Culture    Specimen: Catheterized Urine   Result Value Ref Range    Specimen Description Catheterized Urine     Special Requests Specimen received in preservative     Culture Micro No growth      *Note: Due to a large number of results and/or encounters for the requested time period, some results have not been displayed. A complete set of results can be found in Results Review.

## 2024-02-22 NOTE — PROGRESS NOTES
Consulted to place PIV for antibiotics. Was able to access L lower forearm vein but then pt jerked his arm back which dislodged the catheter. Pt then threatening to hit staff and swearing. Aborted attempt. RN to reconsult VAT when pt more agreeable.

## 2024-02-22 NOTE — PROGRESS NOTES
Paged Dr. Davis patients potassium is low at 2.9 no orders to replace do you want to replace it?     RN managed orders to replace. Patient will likely need IV potassium due to non cooperation with oral medications. Will await new IV placement.

## 2024-02-22 NOTE — PROGRESS NOTES
Consulted again to place PIV. Arrived to pt room and got set up. Only got as far as placing tourniquet before pt became agitated again, pulling arms away and refusing PIV placement. Spoke with bedside RN and it was agreed upon not to attempt piv placement with pts adamant refusal. Reportedly, pt has a care conference today to discuss goals of care. If it is still necessary that pt needs PIV after conference the bedside RN will place new consult for VAT and we will attempt again.

## 2024-02-22 NOTE — PLAN OF CARE
Goal Outcome Evaluation:    .Date/Time 2/21/24 [id number]    Trauma/Ortho/Medical: Medical    Diagnosis: Sepsis d/t bilateral pneumonia   POD#:N/A  Mental Status:A&O to self and somewhat situation. Patient agitated this morning with all ordered tests (video swallow/ echo) as afternoon progressed, agitation improved. Patient accepting of afternoon PO medications and replacement of PIV.   Activity/dangle: Up A1 gb and walker to BR.   Diet:Soft-bite size texture, nectar thick liquids.   Pain:Denies.  Suazo/Voiding:Up to BR, 2 loose bowel movements today. Incon void x1.   Tele/Restraints/Iso:Telemetry Afib, RVR. Occasional PVC's.  02/LDA:PIV SL to L wrist, previous PIV infiltrated. O2 stable on 3LPM.  D/C Date:TBD, goals of care conference planned for tomorrow afternoon.  Other Info:ID following, plans to taper down vancomycin. Remains on cefepime and flagyl for PNA. SLP following, down graded diet as patient was noted to be coughing during initial video swallow study. Poor appetite but did have PO fluid intake.

## 2024-02-22 NOTE — PROVIDER NOTIFICATION
Pt fired sepsis protocol refused labs and vs for the orders, also refused morning medications provider aware.

## 2024-02-22 NOTE — PROGRESS NOTES
Summary: Sepsis d/t bilateral pneumonia    Orientation: JEN, calm, uncooperative, gets agitated and aggressive with care. Refused VS, assessments, and oral meds.    Vitals: JEN    Tele: A.johnathon with PVCs    IV Access/drains: PIV SL    Diet: Soft and bite sized diet level 6, mildly thick level 2.    Mobility: Ax1 BG &Walker    GI/: Incontinent at times, no BM this shift.    Wound/Skin: JEN    Discharge Plan: Pending      See Flow sheets for assessment

## 2024-02-22 NOTE — PROVIDER NOTIFICATION
Patient is refusing oral potassium and no iv access for iv potassium. Pt is very agitated upon arrival to room and during attempt to reposition. Height and weight needed for RN protocol to be ordered, given patients current agitated state unable to get height or weight so unable to order rn managed potassium protocol. Provider aware. Care conference to discuss goals of care at 1pm per provider ok to wait until then to reattempt.

## 2024-02-22 NOTE — PROGRESS NOTES
CLINICAL NUTRITION SERVICES  -  ASSESSMENT NOTE      Recommendations Ordered by Registered Dietitian (RD):   Defer ongoing nutrition POC to providers pending GOC   Ordered admit wt  If cares restorative- may want to consider a wound specific oral nutrition supplement if pt willing to take it. Did not pt refusing some cares      Malnutrition:   % Weight Loss:  10% in 6 months (moderate malnutrition)-- 10% loss in 5 months  % Intake:  JEN  Subcutaneous Fat Loss:  Orbital region moderate depletion and Upper arm region moderate depletion  Muscle Loss:  all severe  Fluid Retention:  None noted    Malnutrition Diagnosis: Severe malnutrition in the context of --  Chronic illness or disease         REASON FOR ASSESSMENT  Hermilo Velasquez is a 89 year old male seen by Registered Dietitian for Admission Nutrition Risk Screen for positive. Unsure wt loss w/ decreased appetite reported.       NUTRITION HISTORY  Information obtained from chart review  Unable to obtain nutrition history from pt. Unable to understand what pt was saying during RD visit this AM. 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 Seen multiple times over past 2 years. Most recently assessed during 1/8-1/11/2024 admission. Pt met severe malnutrition criteria.     Admitted for: Sepsis due to bilateral pneumonia, Mild encephalopathy due to sepsis       CURRENT NUTRITION ORDERS  Diet: Soft and Bite Sized Diet (level 6); Mildly Thick (level 2)      2/20: SLP =  "Oropharyngeal dysphagia -->  Continue soft and bite sized diet and thin liquids   2/21: SLP = Oropharyngeal dysphagia, acute on chronic. Given overt coughing and increased SOB with thin liquids, recommend downgrade to mildly thick liquids.   VFSS ?today    Current Intake/Tolerance:  Poor appetite per chart review. Requires total feed assistance  Pt has received 3 meals this admission per health touch.   50% intakes documented per nursing flow sheet.      NUTRITION FOCUSED PHYSICAL ASSESSMENT FOR DIAGNOSING MALNUTRITION)  Yes         Observed:    Muscle wasting (refer to documentation in Malnutrition section)   Subcutaneous fat loss (refer to documentation in Malnutrition section)  Missing teeth noted     Obtained from Chart/Interdisciplinary Team:  A/O to self. Sometimes refusing meds, vitals, labs  Agitated today     WOC following, assessed 2/20--  Wound location: buttock sacrococcygeal   Wound due to: Pressure Injury and Moisture Associated Skin Damage (MASD) POA    STATUS: initial assessment     2/20: thoracentesis = 0.3 liters of clear fluid were removed    2/20: GOC d/w palliative = Continue current cares at this time.   2/22: family meeting planned to discussion [facility]       ANTHROPOMETRICS  Height: 5' 10\"  Weight: 0 lbs 0 oz (0 kg )-- ordered admit wt   Body mass index is 19.8 kg/m .  Weight Status:  Normal BMI-- borderline underwt  IBW: 75.5 kg  % IBW: 83% (based on 62.6 kg-- 2/13)  Weight History:   Wt loss of 6.8 kg over past 5 months (10%)  Suspect TCU admit wt was ?self-reported, then carried forward  Wt Readings from Last 50 Encounters:   02/13/24 62.6 kg (138 lb)   02/12/24 62.6 kg (138 lb)   02/09/24 63.5 kg (140 lb)   02/07/24 63.5 kg (140 lb)   02/05/24 63.5 kg (140 lb)   02/02/24 67.1 kg (148 lb)   01/30/24 67.1 kg (148 lb)   01/29/24 68 kg (150 lb)   01/24/24 68 kg (150 lb)   01/23/24 68 kg (150 lb)   01/22/24 68 kg (150 lb)-- TCU wt   01/21/24 65.1 kg (143 lb 8.3 oz)   01/09/24 62.1 kg (136 lb 14.5 " oz)   11/30/23 63.8 kg (140 lb 10.5 oz)   10/26/23 66.7 kg (147 lb 1.6 oz)   10/19/23 67.6 kg (149 lb 1.6 oz)   09/29/23 69.4 kg (152 lb 14.4 oz)   09/16/23 71.6 kg (157 lb 13.6 oz)   09/07/23 68.1 kg (150 lb 3.2 oz)   08/29/23 68.9 kg (151 lb 14.4 oz)   08/17/23 67.4 kg (148 lb 9.4 oz)   06/20/23 67.4 kg (148 lb 8 oz)   05/17/23 70 kg (154 lb 4.8 oz)   12/28/22 67.7 kg (149 lb 3.2 oz)   12/02/22 72.1 kg (158 lb 14.4 oz)   11/23/22 71.2 kg (156 lb 14.4 oz)   10/16/22 69.1 kg (152 lb 5.4 oz)   09/09/22 71.5 kg (157 lb 11.2 oz)   09/06/22 68.7 kg (151 lb 8 oz)   08/23/22 75.3 kg (165 lb 14.4 oz)   08/11/22 75.4 kg (166 lb 4.8 oz)   08/10/22 74.2 kg (163 lb 8 oz)   08/01/22 71.1 kg (156 lb 12.8 oz)   07/31/22 71.4 kg (157 lb 4.8 oz)   07/26/22 75.8 kg (167 lb 1.7 oz)   06/07/22 72.5 kg (159 lb 12.8 oz)   05/20/22 73.5 kg (162 lb 1.6 oz)       LABS  Component Value Date   POTASSIUM 2.9 (L) 02/22/2024     Lab 02/22/24  0811 02/21/24  0814 02/20/24  0651 02/19/24 2054   GLC 97 120* 140* 178*      MEDICATIONS   DULoxetine  60 mg Oral Daily    famotidine  20 mg Oral Daily         ASSESSED NUTRITION NEEDS PER APPROVED PRACTICE GUIDELINES:  Dosing Weight: 62.6 kg (2/13)  Estimated Energy Needs: 5655-5495 kcal (30-35 Kcal/Kg)  Justification: IBW <100%  Estimated Protein Needs: 75-94 grams protein (1.2-1.5 g pro/Kg)  Justification: hypercatabolism with acute illness, wound healing, IBW <100%  Estimated Fluid Needs: 1 mL/kcal  Justification: maintenance       MALNUTRITION:  % Weight Loss:  10% in 6 months (moderate malnutrition)-- 10% loss in 5 months  % Intake:  JEN  Subcutaneous Fat Loss:  Orbital region moderate depletion and Upper arm region moderate depletion  Muscle Loss:  all severe  Fluid Retention:  None noted    Malnutrition Diagnosis: Severe malnutrition in the context of --  Chronic illness or disease        NUTRITION DIAGNOSIS:  Malnutrition related to suspected poor appetite 2/2 aging as evidenced by 10% wt loss  in 5 months, moderate-severe fat and muscle loss        NUTRITION INTERVENTIONS  Recommendations / Nutrition Prescription  Defer ongoing nutrition POC to providers pending GOC   Ordered admit wt  If cares restorative- may want to consider a wound specific oral nutrition supplement if pt willing to take it. Did not pt refusing some cares       Implementation  Wt order    Nutrition Goals  Nutrition POC be determined w/in 72 hours        MONITORING AND EVALUATION:  Progress towards goals will be monitored and evaluated per protocol and Practice Guidelines      Marly Lance RD, LD   Pager: [phone]

## 2024-02-22 NOTE — PROGRESS NOTES
[facility]    Medicine Progress Note - Hospitalist Service    Date of Admission:  2/19/2024    Assessment & Plan     Hermilo Velasquez is a 89 year old male with a history of lung cancer s/p wedge resection, recurrent c.diff, afib, COPD, PE, aortic stenosis s/p bioprosthetic heart valve, PATIENCE, CKD who is admitted on 2/19/2024 with worsening cough and increasing O2 needs with CT showing bilateral pneumonia.      Sepsis due to bilateral pneumonia  Loculated effusion of RLL, likely parapneumonic  CT shows increased consolidation of the RLL and new patchy infiltrates in the LLL concerning for progressive bilateral pneumonia. Also noted is loculated effusion on the right, progressed from prior. With increased O2 needs, new leukocytosis, procal of 21 and fever this is likely infectious source. Note history of dysphagia so aspiration pneumonia is a possibility though patient notes stability in his swallowing/eating.   -Cefepime and vancomycin IV initiated on admission, ID consulted, MRSA PCR negative, vancomycin IV discontinued, continued with cefepime and IV flagyl added.   -Reviewed with thoracic surgery, no need for chest tube and patient is s/p thoracentesis. Pleural Fluid clotted off and cell count/differential could not be completed.  Gram stain without organisms and culture negative so far, follow.  -leucocytosis resolved  -SLP eval, planned VFSS but patient not co operative due to delirium.  Meanwhile, on level 6 diet with mildly thick liquid.  -wean O2 as able     Recurrent c diff colitis  diarrhea reported on January 14 prior to last admission admission. Previously recommended that the patient follow up at [facility] for fecal transplantation, but he has not. ID note from 11/28/23 had recommended a prolonged taper of vancomycin. Previously he had been recommended to continue po vancomycin indefinitely until fecal transplant. *oral vancomycin was discontinued during his last admission in  January due to lack of diarrhea. Vanco restarted during last admission.   - Continue vanco PO (refusing at times) twice daily. Patient to follow up with [facility] for fecal transplant.     Acute encephalopathy due to sepsis  Suspect dementia with occasional agitation  Unclear how far off baseline, note he has similar issues during his last hospital stay with negative head CT and brain MRI. Head CT in the ED on 2/19 negative. Also noted to have significant cognitive deficits and memory issues per previous hospital stay.   -Treating sepsis as above.  - PRN zyprexa  -discussed with family, requested decreasing dose of gabapentin and cymbalta.      Dysphagia  -continue dysphagia diet  -speech consult as above.  -encourage PO  -difficult to maintain IV, will avoid IV infusion. If needed small IV boluses.     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 Planned for pet scan and biopsy of nodule on 1/9 was cancelled by the patient  - Outpatient follow-up with oncology.  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 also concern if causing aspiration and PNA.  - Appointment with ENT on 1/11 was cancelled to work up the lung first.  - Wife planned to make appointment with ENT after most recent admission.   - Will consult ENT.      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     COPD   Chronic hypoxic respiratory failure on 3L O2  Does not have significant wheezing, do not think he has COPD exacerbation at this time  -continue PTA inhalers  -wean O2 as above  -treat for pneumonia as above     Hx of VTE/PE  History of subsegmental PE on 11/25/23  - Not on ac due to gi bleed. Hx of IVC  filter in the past, not clear if it was removed.     Paroxysmal Atrial fibrillation  Not on anticoagulation due to anemia thought to be due to a GI bleed. Has history of angioextasia and diverticulosis. Tachy on admission, driven by infection.   -Tachycardia has improved although intermittently tachy due to agitation and likely dehydration/poor PO, continue to monitor on telemetry    Anemia  Thrombocytopenia  Chronic anemia, stable at baseline 9  Drop in Platelet count suspect related to sepsis. Patient runs low count  intermittently, monitor    Severe aortic stenosis s/p bioprosthetic aortic valve  HTN  HLD  Elevated BNP  Chronically elevated troponin  Hx of orthostatic hypotension  Troponin initially 107, 90 on recheck. Both troponin and BNP (30k) higher than previous. Suspect due to sepsis.   - Telemetry, repeat TTE pending  - remains on florinef for hypotension     BPH  - Continue PTA Flomax. 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   -Creatinine improved, follow     Depression/Anxiety/Pain  - PTA Duloxetine and gabapentin doses reduced     Deep Tissue Pressure Injury to sacrum and bilateral buttocks,POA  -WOC consulted        Diet: Combination Diet Regular Diet Adult; Soft and Bite Sized Diet (level 6); Mildly Thick (level 2)    DVT Prophylaxis: Pneumatic Compression Devices  Suazo Catheter: Not present  Lines: None     Cardiac Monitoring: ACTIVE order. Indication: Tachyarrhythmias, acute (48 hours)  Code Status: Full Code      Clinically Significant Risk Factors        # Hypokalemia: Lowest K = 2.9 mmol/L in last 2 days, will replace as needed       # Hypoalbuminemia: Lowest albumin = 3.2 g/dL at 2/19/2024  8:54 PM, will monitor as appropriate     # Thrombocytopenia: Lowest platelets = 120 in last 2 days, will monitor for  bleeding   # Hypertension: Noted on problem list  # Chronic heart failure with preserved ejection fraction: heart failure noted on problem list and last echo with EF >50%           # Financial/Environmental Concerns:    # COPD: noted on problem list        Disposition Plan  anticipate several days pending clinical course.  Care conference with multiple family members including spouse, son and daughter, 2/22, remains full code per family and continued restorative care.      Expected Discharge Date: 02/23/2024        Discharge Comments: Pallative Care Conference 2/22            Areli Davis MD  Hospitalist Service  [facility]  Securely message with [employer] (more info)  Text page via [facility] Paging/Directory   ______________________________________________________________________    Interval History     Has lost multiple IVs due to IV infiltration.   Intermittently agitated per RN.   Patient is more delirious today.    Spitting some meds including PO vanco.   Had extended meeting with family.     Physical Exam   Vital Signs: Temp: 98.6  F (37  C) Temp src: Oral BP: 116/81 Pulse: 88   Resp: 20 SpO2: 91 % O2 Device: Nasal cannula Oxygen Delivery: 5 LPM  Weight: 0 lbs 0 oz    General: AAOxself only today, appears comfortable. Calm when evaluated and during meeting but intermittently agitated per RN  HEENT: PERRLA EOMI. Mucosa very dry  Lungs: Bilateral equal air entry. Coarse breath sounds Rt base with crackles, no wheezing, normal work of breathing.   CVS: S1S2 regular, no tachycardia or murmur.   Abdomen: Soft, NT, ND. BS heard.  MSK: No edema or deformities.  Neuro: AAOX1. Face symmetrical,  Strength symmetrical.  Skin: No rash.       Medical Decision Making       60 MINUTES SPENT BY ME on the date of service doing chart review, history, exam, documentation & further activities per the note.      Data     I have personally reviewed the following data over the past 24 hrs:    9.7  \   9.1 (L)    / 122 (L)     143 107 20.9 /  97   2.9 (L) 27 0.85 \     Procal: N/A CRP: N/A Lactic Acid: 1.8         Imaging results reviewed over the past 24 hrs:   Recent Results (from the past 24 hour(s))   Echo Limited    Narrative    [id number]  [id number]  [id number]  [id number]^FRITZ^VERONIKA     [facility]  [facility]  Echocardiography Laboratory  [address]  [address]  [place] [address]  Phone [phone]  Fax [phone]     Name: CLYDE RODRIGUEZ  MRN: [id number]  : 1932  Study Date: 2024 10:51 AM  Age: 89 yrs  Gender: Male  Patient Location: [facility]  Reason For Study: CHF  Ordering Physician: VERONIKA HU  Referring Physician: VERONIKA HU  Performed By: [facility] Tiana Holland     BSA: 1.8 m2  Height: 70 in  Weight: 138 lb  HR: 89  BP: 113/68 mmHg  ______________________________________________________________________________  Procedure  (Suboptimal images, abbreviated study performed).     ______________________________________________________________________________  Interpretation Summary     Technically limited study due to patient intolerance of the study.     The left ventricle is not well visualized. Due to the poor quality of the  echocardiogram, an assessment of left ventricular ejection fraction cannot be  made.  Right ventricle is not well visualized, however global systolic function is  probably at least mildly reduced.  No obvious pericardial effusion.  Status post aortic valve replacement with a 25-mm St. Arsen Trifecta  bioprosthetic valve. Valve not well visualized. Doppler evaluation suboptimal  for assessment.  The patient exhibited frequent PVCs.  ______________________________________________________________________________  Left Ventricle  The left ventricle is not well visualized. Due to the poor quality of the  echocardiogram, an assessment of left ventricular ejection fraction cannot be  made.     Right Ventricle  The right  ventricle is not well visualized. Right ventricle is not well  visualized, however global systolic function is probably at least mildly  reduced.     Mitral Valve  There is moderate mitral annular calcification.     Tricuspid Valve  The tricuspid valve is not well visualized, but is grossly normal.     Aortic Valve  There is a bioprosthetic aortic valve. Status post aortic valve replacement  with a 25-mm St. Arsen Trifecta bioprosthetic valve. Valve not well visualized.  Doppler evaluation suboptimal for assessment.     Pericardium  No obvious pericardial effusion.     Rhythm  The patient exhibited frequent PVCs.  ______________________________________________________________________________  MMode/2D Measurements & Calculations  IVSd: 0.98 cm  LVIDd: 5.3 cm  LVIDs: 4.7 cm  LVPWd: 0.92 cm  FS: 12.2 %  LV mass(C)d: 188.2 grams  LV mass(C)dI: 105.6 grams/m2  Ao root diam: 3.0 cm  LVOT diam: 2.1 cm  LVOT area: 3.3 cm2  Ao root diam index Ht(cm/m): 1.7  Ao root diam index BSA (cm/m2): 1.7  RWT: 0.35     ______________________________________________________________________________  Report approved by: Calli Leigh 02/21/2024 04:00 PM

## 2024-02-22 NOTE — CONSULTS
SPIRITUAL HEALTH SERVICES - Consult Note  [facility]    Referral Source/Reason for Visit: Family care conference    Those in attendance included: pt Hermilo, Hermilo's wife (Roberta), his daughter (Kayleigh) and son (Mikal). Dr. Davis, palliative provider (Razia Osman) and NP shadowing palliative team.    Dr. Davis provided medical update and took time considering/addressing family's questions and concerns.     Family requested another attempt at IV antibiotics for Hermilo and would like to be present during IV placement, if possible. Hermilo stated agreement. Family acknowledged understanding there is the possibility Hermilo may pull out IV again.    Daughter, Kayleigh, shared concerns about the number of medications Hermilo is taking wondering if some might be contributing to his sleepiness at home. Dr. Davis reviewed medications and made adjustments.     Family finding support in one another and hopeful that Hermilo will be able to return home. They also acknowledged concerns about Hermilo and Roberta caring for one another at this new stage in their lives.    Family considering code status change to DNR/DNI. No change at this time. Goals remain restorative.        Plan: Spiritual Health remains available for spiritual/emotional support. Please contact as needs arise.    Joellen Cat  Associate       [facility] available 24/7 for emergent requests/referrals, either by paging the on-call  or by entering an ASAP/STAT consult in Epic (this will also page the on-call ).

## 2024-02-22 NOTE — PROGRESS NOTES
PALLIATIVE CARE PROGRESS NOTE  [facility]     Patient Name: Hermilo Velasquez  Date of Admission: 2/19/2024   Today the patient was seen for: Goals of care, family care conference     Recommendations & Counseling       GOALS OF CARE:   Restorative without limits   Discussion regarding Hermilo's ability to participate in his care as he has been pulling out IVs x 2 and refusing IV replacement to continue antibiotics. He has also been refusing oral medications and has poor nutritional intake.   Family would like to try IV placement when they are present to see if this helps. I did express concern that it is possible Hermilo will pull IV out again as he may not have the mental capacity to remember our conversation. Family wants to give Hermilo another chance with IV and Hermilo agrees to this.  We discussed QOL and what this means for Hermilo. It is important for Hermilo to be at home. I expressed concern that Hermilo may continue with recurrent hospitalizations as he has had 5 admissions within the past 5 months.  This can be very difficult for a meaningful quality of life and comfort focused care was reviewed as an option if restorative focused care becomes too burdensome.  Reviewed code status with patient and family with recommendations for DNR/DNI as would likely have poor outcome if survived CPR event or intubation.    ADVANCE CARE PLANNING:  No health care directive on file. Per  informed consent policy, next of kin should be involved if patient becomes unable.  There is a POLST form on file, this was reviewed and current.  Code status: Full Code     MEDICAL MANAGEMENT:   #Pain,chronic - tingling toe/foot pain- likely related to neuropathy.  Acetaminophen (Tylenol), PRN has only had one dose on 2/19; consider scheduled  Gabapentin for neuropathic foot pain dosage being decreased by Dr Davis due to family concerns regarding confusion.     #Delirium- multifactorial related to sepsis,  medications, electrolyte disturbances, hospitalization.  Avoid benzodiazepines, antihistamines, anticholinergics if able.  Lights on and blinds open during the day.  Reorient frequently.  Lights & TV off during the night.  Promote normal circadian rhythm.  Limit sensory deprivation - utilize hearing aids, glasses, etc.  Frequently assess basic needs such as temperature, elimination, thirst/hunger, pain      #General Weakness/Debility   PT/OT as tolerated.  Appreciate Speech Language therapy for swallow evaluation.     PSYCHOSOCIAL/SPIRITUAL SUPPORT:  Family wife Roberta, son Mikal and a daughter  [place] community: [employer]     Palliative Care will continue to follow. Thank you for the consult and allowing us to aid in the care of Hermilo Velasquez.    Patient case was discussed with Dr Davis and RN    Razia Osman, CNS  Securely message with [employer]oc[employer] (more info)  Text page via [employer] Paging/Directory       Chart documentation was completed, in part, with [employer] voice-recognition software. Even though reviewed, some grammatical, spelling, and word errors may remain.         Interval History:     Family care meeting was held today for goals of care In Hermilo's room.  I introduced the palliative care team and the services we offer as being a supportive service for patients who have serious illnesses here at the hospital with focus on goals of care and symptom management.Those present included Hermilo, his wife Roberta, his daughter Kayleigh, and son Mikal.  Dr Davis, palliative provider, palliative , and nurse practitioner who was shadowing palliative today were present as well. Dr Davis gave a thorough review of Hermilo's medical condition and treatments as well as some of the challenges encountered during his hospitalization. He needs IV antibiotics to treat his pneumonia but he has pulled his IVs out twice and is not receiving IV antibiotics at this time. Daughter Kayleigh expressed concern that patient may be  confused or agitated due to medications.  Dr Davis reviewed Shantanu medications with family one by one. Antilipid medication was discontinued.  Gabapentin and Cymbalta doses were decreased  to address these concerns.  Although it was explained that the decreased doses of these medications will likely not make much of an impact on his mental status and there is concern that it could increase his neuropathic pain.    Again we addressed concerns regarding Hermilo's ability to participate in his care as he has been pulling out IVs x 2 and refusing IV replacement to continue antibiotics. He has also been refusing oral medications and has poor nutritional intake. It will be difficult for Hermilo's condition to improve if he cannot cooperate with his treatment plan. Restraint is not recommended as it could cause worsening delirium and agitation as well as his dignity and autonomy. Family agrees this would not be a good scenario. We discussed QOL and what this means for Hermilo. It is important for Hermilo to be at home. I expressed concern that Hermilo may continue with recurrent hospitalizations as he has had 5 admissions within the past 5 months.  This can be very difficult for a meaningful quality of life and comfort focused care was reviewed as an option if restorative focused care becomes too burdensome.     It was explained to patient and family that comfort care is a good option when the burdens of aggressive treatments outweigh the benefits and are unwanted by patient, or not in their best interest. Comfort care focuses on optimizing quality of life and relief from distressing symptoms such as pain, shortness of breath, nausea, and anxiety if they are present and allowing nature to take its course. When comfort care is initiated, restorative focused care and artificial means to extend life are discontinued, including further diagnostic testing, lab draws, blood glucose testing/insulin, tube feedings, IV fluids,  antibiotics, medications not for comfort, and vital signs.  We also reviewed code status with patient and family as Hermilo had made comments on Tuesday that he may not want CPR. I recommended DNR/DNI status as Hermilo would likely have poor outcome and quality of life if he underwent and survived CPR or intubation. Family would like to consider this information and talk amongst themselves prior to making a decision.    After much discussion Family would like to try IV placement when they are present to see if this helps. I did express concern that it is possible Hermilo will pull IV out again as he may not have the mental capacity to remember our conversation. Family wants to give Hermilo another chance with IV and Hermilo agrees to this. Palliative care will check in tomorrow to see if family has any questions or concerns.      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 Had just been discharged from TCU and went home for a few days but quickly decompensated. Concern for aspiration pneumonia, loculated lung abx may not be effective for. Current hospitalization will be his 5th within the past 5 months.             Review of Systems:     Besides above, ROS was reviewed and is unremarkable          Physical Exam:   Temp:  [97.6  F (36.4  C)-98.6  F (37  C)] 98.6  F (37  C)  Pulse:  [] 111  Resp:  [16-18] 16  BP:  "(128-154)/(66-90) 128/66  SpO2:  [77 %-100 %] 91 %  0 lbs 0 oz    Physical Exam  GENERAL:  Alert, no distress,   HEAD: Normocephalic atraumatic  SCLERA: no drainage or redness  EXTREMITIES: Warm; no edema  ABDOMEN:  Flat  RESPIRATORY: Breathing non labored.  NEUROLOGIC: Alert. [place],   PSYCH: Calm, cooperative, poor insight.           Current Problem List:   Active Problems:    * No active hospital problems. *      Allergies   Allergen Reactions    Omeprazole Itching    Pantoprazole Itching    Prevacid [Lansoprazole] Itching    Lasix [Furosemide] Rash    Lidocaine Blisters and Rash     Allergy to lidocaine ointment  Allergy to lidocaine ointment      Penicillin G Rash    Penicillins Rash     \"broke out from injection\" 60 yrs ago  Tolerates cephalosporins            Data Reviewed:     Recent Results (from the past 24 hour(s))   Echo Limited    Narrative    [id number]  [id number]  [id number]  [id number]^[id number]^VERONIKA     [facility]  [facility]  Echocardiography Laboratory  [facility] [facility] [facility] [address]  [place] [address]  Phone [phone]  Fax [phone]     Name: CLYDE RODRIGUEZ  MRN: [id number]  : 1932  Study Date: 2024 10:51 AM  Age: 89 yrs  Gender: Male  Patient Location: [facility]  Reason For Study: CHF  Ordering Physician: VERONIKA HU  Referring Physician: VERONIKA HU  Performed By: MORIS Holland     BSA: 1.8 m2  Height: 70 in  Weight: 138 lb  HR: 89  BP: 113/68 mmHg  ______________________________________________________________________________  Procedure  (Suboptimal images, abbreviated study performed).     ______________________________________________________________________________  Interpretation Summary     Technically limited study due to patient intolerance of the study.     The left ventricle is not well visualized. Due to the poor quality of the  echocardiogram, an assessment of left ventricular ejection fraction cannot be  made.  Right " ventricle is not well visualized, however global systolic function is  probably at least mildly reduced.  No obvious pericardial effusion.  Status post aortic valve replacement with a 25-mm St. Arsen Trifecta  bioprosthetic valve. Valve not well visualized. Doppler evaluation suboptimal  for assessment.  The patient exhibited frequent PVCs.  ______________________________________________________________________________  Left Ventricle  The left ventricle is not well visualized. Due to the poor quality of the  echocardiogram, an assessment of left ventricular ejection fraction cannot be  made.     Right Ventricle  The right ventricle is not well visualized. Right ventricle is not well  visualized, however global systolic function is probably at least mildly  reduced.     Mitral Valve  There is moderate mitral annular calcification.     Tricuspid Valve  The tricuspid valve is not well visualized, but is grossly normal.     Aortic Valve  There is a bioprosthetic aortic valve. Status post aortic valve replacement  with a 25-mm St. Arsen Trifecta bioprosthetic valve. Valve not well visualized.  Doppler evaluation suboptimal for assessment.     Pericardium  No obvious pericardial effusion.     Rhythm  The patient exhibited frequent PVCs.  ______________________________________________________________________________  MMode/2D Measurements & Calculations  IVSd: 0.98 cm  LVIDd: 5.3 cm  LVIDs: 4.7 cm  LVPWd: 0.92 cm  FS: 12.2 %  LV mass(C)d: 188.2 grams  LV mass(C)dI: 105.6 grams/m2  Ao root diam: 3.0 cm  LVOT diam: 2.1 cm  LVOT area: 3.3 cm2  Ao root diam index Ht(cm/m): 1.7  Ao root diam index BSA (cm/m2): 1.7  RWT: 0.35     ______________________________________________________________________________  Report approved by: Calli Leigh 02/21/2024 04:00 PM           Lab Results   Component Value Date    WBC 14.6 (H) 02/21/2024    WBC 21.3 (H) 02/20/2024    WBC 17.1 (H) 02/19/2024    HGB 9.9 (L) 02/21/2024    HGB 9.3 (L)  02/20/2024    HGB 10.0 (L) 02/19/2024    HCT 31.5 (L) 02/21/2024    HCT 29.3 (L) 02/20/2024    HCT 31.9 (L) 02/19/2024     (L) 02/21/2024     (L) 02/20/2024     02/19/2024     02/21/2024     02/20/2024     02/19/2024    POTASSIUM 3.6 02/21/2024    POTASSIUM 3.8 02/20/2024    POTASSIUM 3.9 02/19/2024    CHLORIDE 106 02/21/2024    CHLORIDE 105 02/20/2024    CHLORIDE 99 02/19/2024    CO2 28 02/21/2024    CO2 25 02/20/2024    CO2 26 02/19/2024    BUN 23.6 (H) 02/21/2024    BUN 19.8 02/20/2024    BUN 19.4 02/19/2024    CR 1.01 02/21/2024    CR 1.36 (H) 02/20/2024    CR 1.51 (H) 02/19/2024     (H) 02/21/2024     (H) 02/20/2024     (H) 02/19/2024    SED 43 (H) 10/06/2022    SED 89 (H) 05/16/2019    SED 83 (H) 05/15/2019    DD 4.6 (H) 03/30/2020    NTBNPI 30,280 (H) 02/19/2024    NTBNPI 24,255 (H) 01/15/2024    NTBNPI 6,480 (H) 11/25/2023    NTBNP 1,233 (H) 08/28/2020    NTBNP 1,593 (H) 08/11/2020    NTBNP 1,363 (H) 10/17/2014    TROPI 0.513 (HH) 04/14/2021    TROPI 0.529 (HH) 04/14/2021    TROPI 0.331 (HH) 04/13/2021    AST 17 02/19/2024    AST 60 (H) 01/15/2024    AST 7 01/08/2024    ALT 7 02/19/2024    ALT 15 01/15/2024    ALT <5 01/08/2024    ALKPHOS 93 02/19/2024    ALKPHOS 102 01/15/2024    ALKPHOS 90 01/08/2024    BILITOTAL 0.9 02/19/2024    BILITOTAL 0.9 01/15/2024    BILITOTAL 0.3 01/08/2024    DAMIAN 16 01/16/2024    DAMIAN 17 11/06/2022    INR 1.37 (H) 02/19/2024    INR 1.53 (H) 01/08/2024    INR 1.15 09/12/2023       95 MINUTES SPENT BY ME on the date of service doing chart review, history, reviewing case with MD and RN, family care conference, exam, documentation & further activities per the note.

## 2024-02-22 NOTE — PROGRESS NOTES
Patient refusing to allow vital signs to be taken, sepsis BPA protcol fired.  unable to draw stat lactic acid due to severely agitated behavior. Writer unable to obtain accurate O2 reading, patient pulling at lines-patient attempting to strike RN staff at bedside and pinching, kicking staff. IM Zyprexa given for agitation.

## 2024-02-22 NOTE — PROGRESS NOTES
"Patient remains very agitated with any cares. IV team returned to reattempt replacement of IV access. Patient swearing and attempting to strike at staff. Encouragement provided patient stating \"I don't want to get better\"  Patient removing dressings on UE skin tear, refusing to allow writer to redress wound.   IV requesting when patient agreeable for IV to replace consult. At this time patient is too combative to replace PIV.   "

## 2024-02-23 NOTE — PROGRESS NOTES
A&O to self, Soft &Bite sized diet level 6, Up1 GB/W,  on Tele, Incontinent of B&B, On Tele AFIB, K+ 3.1, Scattered bruises,PIV ,Palliative care following.

## 2024-02-23 NOTE — PROGRESS NOTES
[facility]    Medicine Progress Note - Hospitalist Service    Date of Admission:  2/19/2024    Assessment & Plan   Hermilo Velasquez is a 89 year old male with a history of lung cancer s/p wedge resection, recurrent c.diff, afib, COPD, PE, aortic stenosis s/p bioprosthetic heart valve, PATIENCE, CKD who is admitted on 2/19/2024 with worsening cough and increasing O2 needs with CT showing bilateral pneumonia.      Sepsis due to bilateral pneumonia  Loculated effusion of RLL, likely parapneumonic    -Cefepime and vancomycin IV initiated on admission, ID consulted, MRSA PCR negative, vancomycin IV discontinued, continued with cefepime and IV flagyl added.   -Reviewed with thoracic surgery, no need for chest tube and patient is s/p thoracentesis. Pleural Fluid clotted off and cell count/differential could not be completed.  Gram stain without organisms and culture negative so far, follow.  -leucocytosis resolved  -Appreciate SLP following. S/p VFSS today  -wean O2 as able     Recurrent c diff colitis  diarrhea reported on January 14 prior to last admission admission. Previously recommended that the patient follow up at [facility] for fecal transplantation, but he has not. ID note from 11/28/23 had recommended a prolonged taper of vancomycin. Previously he had been recommended to continue po vancomycin indefinitely until fecal transplant. *oral vancomycin was discontinued during his last admission in January due to lack of diarrhea. Vanco restarted during last admission.   - Continue vanco PO (refusing at times) twice daily. Patient to follow up with [facility] for fecal transplant.     Acute encephalopathy due to sepsis  Suspect dementia with occasional agitation  Unclear how far off baseline, note he has similar issues during his last hospital stay with negative head CT and brain MRI. Head CT in the ED on 2/19 negative. Also noted to have significant cognitive deficits and memory issues per previous  "hospital stay.   -Treating sepsis as above.  - PRN zyprexa  -discussed with family, requested decreasing dose of gabapentin and cymbalta.      Dysphagia  -continue dysphagia diet  -speech consult as above.  -encourage PO  -difficult to maintain IV, will avoid IV infusion. If needed small IV boluses.      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 Planned for pet scan and biopsy of nodule on 1/9 was cancelled by the patient  - Outpatient follow-up with oncology.  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 also concern if causing aspiration and PNA.  - Appointment with ENT on 1/11 was cancelled to work up the lung first.  - Wife planned to make appointment with ENT after most recent admission.   - D/W ENT  2/23: \"Interval PET imaging with diagnostic CT neck with contrast for further characterization would be necessary and recommended before considering biopsy. Ultimately, direct laryngoscopy with biopsy in the operating room would be necessary to obtain tissue diagnosis from this site. Would not recommend any further imaging or surgical intervention until determination from medical oncology appropriateness for any chemotherapeutic management. Findings in the base of tongue would not be treated surgically at this time. 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     COPD   Chronic hypoxic respiratory failure on 3L O2  Does not have significant wheezing, do not think he has COPD exacerbation at this time  -continue PTA inhalers  -wean O2 as above  -treat for pneumonia as above     Hx of VTE/PE  History of subsegmental PE on 11/25/23  - Not on ac due to gi bleed. Hx of " IVC filter in the past, not clear if it was removed.     Paroxysmal Atrial fibrillation  Not on anticoagulation due to anemia thought to be due to a GI bleed. Has history of angioextasia and diverticulosis. Tachy on admission, driven by infection.   -Tachycardia has improved although intermittently tachy due to agitation and likely dehydration/poor PO, continue to monitor on telemetry     Anemia  Thrombocytopenia  Chronic anemia, stable at baseline 9  Drop in Platelet count suspect related to sepsis. Patient runs low count  intermittently, monitor     Severe aortic stenosis s/p bioprosthetic aortic valve  HTN  HLD  Elevated BNP  Chronically elevated troponin  Hx of orthostatic hypotension  Troponin initially 107, 90 on recheck. Both troponin and BNP (30k) higher than previous. Suspect due to sepsis.   - Telemetry, repeat TTE pending  - remains on florinef for hypotension     BPH  - Continue PTA Flomax. 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   -Creatinine improved, follow     Depression/Anxiety/Pain  - PTA Duloxetine and gabapentin doses reduced     Deep Tissue Pressure Injury to sacrum and bilateral buttocks,POA  -WOC consulted             Diet: Combination Diet Regular Diet Adult; Soft and Bite Sized Diet (level 6); Slightly Thick (level 1)    DVT Prophylaxis: Pneumatic Compression Devices  Suazo Catheter: Not present  Lines: None     Cardiac Monitoring: ACTIVE order. Indication: Tachyarrhythmias, acute (48 hours)  Code Status: Full Code      Clinically Significant Risk Factors        # Hypokalemia: Lowest K = 2.9 mmol/L in last 2 days, will replace as needed       # Hypoalbuminemia: Lowest albumin = 3.2 g/dL at 2/19/2024  8:54 PM, will monitor as appropriate   # Thrombocytopenia: Lowest platelets = 122 in last 2 days, will  "monitor for bleeding   # Hypertension: Noted on problem list  # Chronic heart failure with preserved ejection fraction: heart failure noted on problem list and last echo with EF >50%        # Severe Malnutrition: based on nutrition assessment, PRESENT ON ADMISSION   # Financial/Environmental Concerns:    # COPD: noted on problem list        Disposition Plan      Expected Discharge Date: 02/24/2024        Discharge Comments: Pallative Care Conference 2/22- Full restorative care, goal to return home            Evan Newton MD  Hospitalist Service  [facility]  Securely message with [employer] (more info)  Text page via [facility] Paging/Directory   ______________________________________________________________________    Interval History   History reviewed. Patient sleepy  No new concerns as per bedside RN    Physical Exam   BP (!) 151/84 (BP Location: Right arm)   Pulse 104   Temp 98.2  F (36.8  C) (Oral)   Resp 16   Ht 1.778 m (5' 10\")   Wt 57.6 kg (126 lb 15.8 oz)   SpO2 98%   BMI 18.22 kg/m      Neck- supple  CVS- I+II+ no m/r/g  RS- CTAB  Abdo- soft, no tenderness . No g/r/r  Ext- no edema   Medical Decision Making       51 MINUTES SPENT BY ME on the date of service doing chart review, history, exam, documentation & further activities per the note.   D/W Patient, bedside RN, ENT. Review of labs and previous records    Data   ------------------------- PAST 24 HR DATA REVIEWED -----------------------------------------------    I have personally reviewed the following data over the past 24 hrs:    8.1  \   8.9 (L)   / 126 (L)     144 108 (H) 17.5 /  98   3.2 (L); 3.2 (L) 26 0.80 \     Procal: N/A CRP: N/A Lactic Acid: 1.0         Imaging results reviewed over the past 24 hrs:   Recent Results (from the past 24 hour(s))   XR Video Swallow with SLP or OT    Narrative    VIDEO SWALLOWING EVALUATION   2/23/2024 11:16 AM     HISTORY: Aspiration risk.    COMPARISON: 10/11/2022.    FLUOROSCOPY " TIME: 3 minutes.  SPOT IMAGES OR CINE RUNS: 9      Impression    IMPRESSION:  Thin: Moderate to deep penetration with the cup with a small amount of  laryngeal residue. With an effective chin tuck, no definite new  penetration observed with the teaspoon or cup.    Slightly thick: No penetration. Mild pharyngeal residue.    Puree: No penetration. Mild to moderate oral and pharyngeal residue  which improves with a slightly thick liquid wash and subsequent  swallows.    Semisolid: Not administered.    Solid: Not administered.    This study only includes the cervical esophagus. Please see separate  report from speech pathology for additional details.     FUENTES DE DIOS MD         SYSTEM ID:  [id number]

## 2024-02-23 NOTE — PROGRESS NOTES
[facility]    Infectious Disease Progress Note    Date of Service (when I saw the patient): 02/23/2024     Assessment & Plan   Hermilo Velasquez is a 89 year old who was admitted on 2/19/2024.     Impression:  89 yo with a history of lung cancer s/p wedge resection  History of recurrent c.diff  Afib  COPD, PE  Aortic stenosis s/p bioprosthetic heart valve  CKD .   Admitted with cough, shortness of breath, fever  On IV vanco and cefepime and also on oral vanco   PCN listed as childhood allergy      Recommendations:   Continue on oral vanco, seems like was on some sort of taper for a while now, make the does BID for prophylaxis last positive C diff was 2 months ago continue indefinitely   Continue IV flagyl and continue on IV cefepime day 5 today   Loculated pleural effusion s/p thora cultures are penidng   Anticipate continued hospitalization for the weekend   If pleural fluid cultures stay negative then 7 day course of antibiotics for pneumonia   If pleural fluid cultures come back positive then reassess     Venkatesh Rhodes MD    Interval History   Tolerating antibiotics ok   No new rashes or issues with antibiotics   Labs reviewed   No changes to past medical, social or family history   Noted goals of care conversation       Physical Exam   Temp: 98.8  F (37.1  C) Temp src: Axillary BP: 133/79 Pulse: (!) 129   Resp: 16 SpO2: 97 % O2 Device: Nasal cannula Oxygen Delivery: 3 LPM  Vitals:    02/22/24 1406 02/23/24 0630   Weight: 61 kg (134 lb 7.7 oz) 57.6 kg (126 lb 15.8 oz)     Vital Signs with Ranges  Temp:  [98.8  F (37.1  C)] 98.8  F (37.1  C)  Pulse:  [102-129] 129  Resp:  [16-18] 16  BP: (130-133)/(79-88) 133/79  SpO2:  [93 %-97 %] 97 %    Constitutional: on 3 L O2s  Lungs: Coarse bilateral   Cardiovascular: irregular  Abdomen:  soft, non-distended, non-tender  Skin: No rashes, no cyanosis, no edema  Other:    Medications      albuterol  2.5 mg Nebulization 4x Daily    ceFEPIme  2 g  Intravenous Q12H    DULoxetine  40 mg Oral Daily    famotidine  20 mg Oral Daily    fludrocortisone  0.1 mg Oral Daily    gabapentin  400 mg Oral At Bedtime    metroNIDAZOLE  500 mg Intravenous Q12H    potassium chloride  40 mEq Oral or Feeding Tube Once    sodium chloride (PF)  3 mL Intracatheter Q8H    tamsulosin  0.4 mg Oral Daily    umeclidinium  1 puff Inhalation Daily    vancomycin  125 mg Oral Q6H       Data   All microbiology laboratory data reviewed.  Recent Labs   Lab Test 02/23/24  0720 02/22/24  0811 02/21/24  0814   WBC 8.1 9.7 14.6*   HGB 8.9* 9.1* 9.9*   HCT 28.6* 29.1* 31.5*   MCV 83 83 84   * 122* 120*     Recent Labs   Lab Test 02/23/24  0720 02/22/24  0811 02/21/24  0814   CR 0.80 0.85 1.01     Recent Labs   Lab Test 10/06/22  1526   SED 43*     Recent Labs   Lab Test 04/14/21  0815 04/13/21  2054 04/13/21  2038 09/13/20  1329 09/13/20  1256 08/15/20  1451 03/22/20  1950 03/22/20  1941 03/22/20  1935   CULT Canceled, Test credited  >10 Squamous epithelial cells/low power field indicates oral contamination. Please   recollect.  *  Notification of test cancellation was given to  Elva Ospina RN 1139 4/14/20 by AM   No growth No growth No growth No growth No growth  No growth No growth Cultured on the 4th day of incubation:  Staphylococcus capitis  Identification obtained by MALDI-TOF mass spectrometry research use only database. Test   characteristics determined and verified by the Infectious Diseases Diagnostic Laboratory   ([facility]) [place], [place].  *  Critical Value/Significant Value, preliminary result only, called to and read back by  ANNABELLE CABRERA RN  03.26.20 CF    (Note)  POSITIVE for Staphylococci other than S.aureus, S.epidermidis and  S.lugdunensis, by adSage multiplex nucleic acid test.  Coagulase-negative staphylococci are the most common venipuncture or  collection associated skin CONTAMINANTS grown in blood cultures.  Final identification and antimicrobial  susceptibility testing will be  verified by standard methods.    Specimen tested with [employer] multiplex, gram-positive blood culture  nucleic acid test for the following targets: Staph aureus, Staph  epidermidis, Staph lugdunensis, other Staph species, Enterococcus  faecalis, Enterococcus faecium, Streptococcus species, S. agalactiae,  S. anginosus grp., S. pneumoniae, S. pyogenes, Listeria sp., mecA  (methicillin resistance) and Samanta/B (vancomycin resistance).    Critical Value/Significant Value called to and read back by Doreen Silverman RN on 3.26.20 at 0727. bw   No growth       All cultures:  Recent Labs   Lab 02/20/24  1336 02/19/24  2105 02/19/24 2054   CULTURE No Growth No growth after 3 days No growth after 3 days      Blood culture:  Results for orders placed or performed during the hospital encounter of 02/19/24   Blood Culture Peripheral Blood    Specimen: Peripheral Blood   Result Value Ref Range    Culture No growth after 3 days    Blood Culture Peripheral Blood    Specimen: Peripheral Blood   Result Value Ref Range    Culture No growth after 3 days    Results for orders placed or performed during the hospital encounter of 11/25/23   Blood Culture Peripheral Blood    Specimen: Peripheral Blood   Result Value Ref Range    Culture No Growth    Blood Culture Peripheral Blood    Specimen: Peripheral Blood   Result Value Ref Range    Culture No Growth    Results for orders placed or performed during the hospital encounter of 09/12/23   Blood Culture Peripheral Blood    Specimen: Peripheral Blood   Result Value Ref Range    Culture No Growth    Blood Culture Peripheral Blood    Specimen: Peripheral Blood   Result Value Ref Range    Culture No Growth    Results for orders placed or performed during the hospital encounter of 11/06/22   Blood Culture Wrist, Left    Specimen: Wrist, Left; Blood   Result Value Ref Range    Culture No Growth    Blood Culture Peripheral Blood    Specimen: Peripheral Blood   Result  Value Ref Range    Culture No Growth    Results for orders placed or performed during the hospital encounter of 10/09/22   Blood Culture Peripheral Blood    Specimen: Peripheral Blood   Result Value Ref Range    Culture No Growth    Blood Culture Peripheral Blood    Specimen: Peripheral Blood   Result Value Ref Range    Culture No Growth    Results for orders placed or performed during the hospital encounter of 04/10/22   Blood Culture Line, venous    Specimen: Line, venous; Blood   Result Value Ref Range    Culture No Growth    Blood Culture Peripheral Blood    Specimen: Peripheral Blood   Result Value Ref Range    Culture No Growth    Results for orders placed or performed during the hospital encounter of 04/13/21   Blood culture    Specimen: Blood    Left Arm   Result Value Ref Range    Specimen Description Blood Left Arm     Culture Micro No growth    Blood culture    Specimen: Blood    Right Arm   Result Value Ref Range    Specimen Description Blood Right Arm     Culture Micro No growth    Results for orders placed or performed during the hospital encounter of 09/13/20   Blood culture    Specimen: Blood    Right Arm   Result Value Ref Range    Specimen Description Blood Right Arm     Culture Micro No growth    Blood culture    Specimen: Blood    Left Arm   Result Value Ref Range    Specimen Description Blood Left Arm     Culture Micro No growth    Results for orders placed or performed during the hospital encounter of 08/15/20   Blood culture    Specimen: Blood    Left Arm   Result Value Ref Range    Specimen Description Blood Left Arm     Culture Micro No growth    Blood culture    Specimen: Blood    Right Arm   Result Value Ref Range    Specimen Description Blood Right Arm     Culture Micro No growth    Results for orders placed or performed during the hospital encounter of 03/22/20   Blood culture    Specimen: Blood    Right Arm   Result Value Ref Range    Specimen Description Blood Right Arm     Culture  Micro (A)      Cultured on the 4th day of incubation:  Staphylococcus capitis  Identification obtained by MALDI-TOF mass spectrometry research use only database. Test   characteristics determined and verified by the Infectious Diseases Diagnostic Laboratory   ([facility]) [place], [place].      Culture Micro       Critical Value/Significant Value, preliminary result only, called to and read back by  ANNABELLE CABRERA RN  03.26.20 CF      Culture Micro       (Note)  POSITIVE for Staphylococci other than S.aureus, S.epidermidis and  S.lugdunensis, by [employer]ene multiplex nucleic acid test.  Coagulase-negative staphylococci are the most common venipuncture or  collection associated skin CONTAMINANTS grown in blood cultures.  Final identification and antimicrobial susceptibility testing will be  verified by standard methods.    Specimen tested with Verigene multiplex, gram-positive blood culture  nucleic acid test for the following targets: Staph aureus, Staph  epidermidis, Staph lugdunensis, other Staph species, Enterococcus  faecalis, Enterococcus faecium, Streptococcus species, S. agalactiae,  S. anginosus grp., S. pneumoniae, S. pyogenes, Listeria sp., mecA  (methicillin resistance) and Samanta/B (vancomycin resistance).    Critical Value/Significant Value called to and read back by Doreen Silverman RN on 3.26.20 at 0727. bw         Susceptibility    Staphylococcus capitis - SOCRATES     Ciprofloxacin <=0.5 Susceptible ug/mL     Clindamycin <=0.25 Susceptible ug/mL     Erythromycin 0.5 Susceptible ug/mL     Gentamicin <=0.5 Susceptible ug/mL     Levofloxacin 0.5 Susceptible ug/mL     Oxacillin <=0.25 Susceptible ug/mL     Tetracycline <=1 Susceptible ug/mL     Vancomycin <=0.5 Susceptible ug/mL   Blood culture    Specimen: Blood    Left Arm   Result Value Ref Range    Specimen Description Blood Left Arm     Culture Micro No growth    Results for orders placed or performed during the hospital encounter of 12/22/19   Blood  culture    Specimen: Blood    Right Arm   Result Value Ref Range    Specimen Description Blood Right Arm     Special Requests Aerobic and anaerobic bottles received     Culture Micro (A)      Cultured on the 2nd day of incubation:  Staphylococcus hominis      Culture Micro       Critical Value/Significant Value, preliminary result only, called to and read back by  DAVID GARCIA RN @1011 12/24/19. SCG      Culture Micro       (Note)  POSITIVE for Staphylococci other than S.aureus, S.epidermidis and  S.lugdunensis, by Verigene multiplex nucleic acid test.  Coagulase-negative staphylococci are the most common venipuncture or  collection associated skin CONTAMINANTS grown in blood cultures.  Final identification and antimicrobial susceptibility testing will be  verified by standard methods.    Specimen tested with Verigene multiplex, gram-positive blood culture  nucleic acid test for the following targets: Staph aureus, Staph  epidermidis, Staph lugdunensis, other Staph species, Enterococcus  faecalis, Enterococcus faecium, Streptococcus species, S. agalactiae,  S. anginosus grp., S. pneumoniae, S. pyogenes, Listeria sp., mecA  (methicillin resistance) and Samanta/B (vancomycin resistance).    Critical Value/Significant Value called to and read back by Aurora Giron RN at 6335 12.24.19.DK         Susceptibility    Staphylococcus hominis - SOCRATES     Ciprofloxacin <=0.5 Susceptible ug/mL     Clindamycin* <=0.25 Susceptible ug/mL      * This isolate DOES NOT demonstrate inducible clindamycin resistance in vitro. Clindamycin is susceptible and could be used when indicated, however, erythromycin is resistant and should not be used.     Erythromycin >=8 Resistant ug/mL     Gentamicin <=0.5 Susceptible ug/mL     Levofloxacin <=0.12 Susceptible ug/mL     Oxacillin <=0.25 Susceptible ug/mL     Tetracycline 2 Susceptible ug/mL     Vancomycin <=0.5 Susceptible ug/mL   Blood culture    Specimen: Blood    Right Hand   Result Value  Ref Range    Specimen Description Blood Right Hand     Special Requests Aerobic and anaerobic bottles received     Culture Micro No growth    Results for orders placed or performed during the hospital encounter of 05/13/19   Blood culture    Specimen: Arm, Left; Blood    Left Arm   Result Value Ref Range    Specimen Description Blood Left Arm     Special Requests Aerobic and anaerobic bottles received     Culture Micro No growth    Blood culture    Specimen: Arm, Left; Blood    Left Arm   Result Value Ref Range    Specimen Description Blood Left Arm     Special Requests Aerobic and anaerobic bottles received     Culture Micro No growth      *Note: Due to a large number of results and/or encounters for the requested time period, some results have not been displayed. A complete set of results can be found in Results Review.      Urine culture:  Results for orders placed or performed during the hospital encounter of 01/15/24   Urine Culture    Specimen: Urine, Clean Catch   Result Value Ref Range    Culture <10,000 CFU/mL Mixture of Urogenital Sylvia    Results for orders placed or performed during the hospital encounter of 10/09/22   Urine Culture    Specimen: Urine, Midstream   Result Value Ref Range    Culture No Growth    Results for orders placed or performed during the hospital encounter of 04/10/22   Urine Culture    Specimen: Urine, Catheter   Result Value Ref Range    Culture No Growth    Results for orders placed or performed during the hospital encounter of 03/22/20   Urine Culture    Specimen: Catheterized Urine   Result Value Ref Range    Specimen Description Catheterized Urine     Special Requests Specimen received in preservative     Culture Micro No growth      *Note: Due to a large number of results and/or encounters for the requested time period, some results have not been displayed. A complete set of results can be found in Results Review.

## 2024-02-23 NOTE — PROGRESS NOTES
Video Fluoroscopic Swallow Study (VFSS)     02/23/24 1213   Appointment Info   Signing Clinician's Name / Credentials (SLP) Aurora Bobo MS CCC-SLP   General Information   Onset of Illness/Injury or Date of Surgery 02/19/24   Referring Physician Charlie Muñiz DO   Patient/Family Therapy Goal Statement (SLP) Did not state   Pertinent History of Current Problem   Per provider documentation - Hermilo Velasquez is a 89 year old male with a history of lung cancer s/p wedge resection, recurrent c.diff, afib, COPD, PE, aortic stenosis s/p bioprosthetic heart valve, PATIENCE, CKD who is admitted on 2/19/2024 with worsening cough and increasing O2 needs with CT showing bilateral pneumonia. FDG avid soft tissue nodule in the right tongue base  Concern for squamous cell carcinoma.     General Observations   Pt alert, upright in wheelchair for VFSS: pleasant during evaluation though difficulty following directions requiring repetition of verbal and tactile cues     Type of Evaluation   Type of Evaluation Swallow Evaluation   General Swallowing Observations   Swallowing Evaluation Videofluoroscopic swallow study (VFSS)   VFSS Evaluation   Radiologist Dr. Marr   Views Taken left lateral   Physical Location of Procedure [facility], Radiology Dept, Fluoroscopy Suite   VFSS Textures Trialed thin liquids;slightly thick liquids;pureed   VFSS Eval: Thin Liquid Texture Trial   Mode of Presentation, Thin Liquid cup;spoon   Order of Presentation 2 cup, 3 cup with incomplete chin tuck, 4 tsp with manual assisted chin tuck, 5 cup with manual assisted chin tuck   Preparatory Phase poor bolus control   Oral Phase, Thin Liquid impaired AP movement;premature pharyngeal entry   Bolus Location When Swallow Triggered pyriforms   Pharyngeal Phase, Thin Liquid impaired hyolaryngeal excursion;impaired epiglottic movement;impaired tongue base retraction;residue in vallecula;residue in pyriform sinus;impaired pharyngoesophageal  segment opening  (trace residue)   Rosenbek's Penetration Aspiration Scale: Thin Liquid Trial Results 3 - contrast remains above the vocal cords, visible residue remains (penetration)   Diagnostic Statement Trace-mild before/during laryngeal penetration above the vocal cords occuring 2/2 reduced oral control, premature bolus spillage, delay in laryngeal closure when swallowing in head neutral positionig + with incomplete chin tuck use. Pt needed max cues, including tactile/manual assist, to accurately complete chin tuck which was effective in elminating penetration.   VFSS Eval: Slightly Thick Liquids   Mode of Presentation cup   Order of Presentation 1, 6, 9   Preparatory Phase poor bolus control   Oral Phase impaired AP movement;premature pharyngeal entry   Bolus Location When Swallow Triggered pyriforms   Pharyngeal Phase impaired hyolaryngel excursion;impaired epiglottic movement;impaired tongue base retraction;residue in vallecula;residue in pyriform sinus;impaired pharyngoesophageal segment opening  (trace residue)   Rosenbek's Penetration Aspiration Scale 1 - no aspiration, contrast does not enter airway   VFSS Evaluation: Puree Solid Texture Trial   Mode of Presentation, Puree spoon   Order of Presentation 7, 8   Preparatory Phase prolonged bolus preparation  (severely prolonged oral transit with lingual pumping, ?barium related)   Oral Phase, Puree impaired AP movement;residue in oral cavity;premature pharyngeal entry   Bolus Location When Swallow Triggered valleculae   Pharyngeal Phase, Puree impaired hyolaryngel excursion;impaired epiglottic movement;impaired tongue base retraction;residue in vallecula;residue in pyriform sinus;impaired pharyngoesophageal segment opening  (mild residue)   Rosenbek's Penetration Aspiration Scale: Puree Food Trial Results 1 - no aspiration, contrast does not enter airway   Swallowing Recommendations   Diet Consistency Recommendations soft & bite-sized (level 6);slightly  thick liquids (level 1)   Supervision Level for Intake 1:1 supervision needed   Mode of Delivery Recommendations bolus size, small;no straws;food moistened;slow rate of intake   Swallowing Maneuver Recommendations alternate food and liquid intake  (PRN)   Monitoring/Assistance Required (Eating/Swallowing) check mouth frequently for oral residue/pocketing;stop eating activities when fatigue is present;monitor for cough or change in vocal quality with intake   Recommended Feeding/Eating Techniques (Swallow Eval) maintain upright sitting position for eating;maintain upright posture during/after eating for 30 minutes;minimize distractions during oral intake;provide assist with feeding   Medication Administration Recommendations, Swallowing (SLP) as tolerated   General Therapy Interventions   Planned Therapy Interventions Dysphagia Treatment   Dysphagia treatment Oropharyngeal exercise training;Modified diet education;Instruction of safe swallow strategies;Compensatory strategies for swallowing   Clinical Impression   Criteria for Skilled Therapeutic Interventions Met (SLP Eval) Yes, treatment indicated   SLP Diagnosis moderate oral dysphagia, mild pharyngeal dysphagia   Risks & Benefits of therapy have been explained evaluation/treatment results reviewed;care plan/treatment goals reviewed;risks/benefits reviewed;current/potential barriers reviewed;participants voiced agreement with care plan;participants included;patient   Clinical Impression Comments   Video fluoroscopic swallow study completed with thin liquids (head neutral and chin tuck positioning), slightly thick liquids, puree solids (chewable solids not completed given dislike/significant delay with puree). Pt currently presents with moderate oral dysphagia, mild pharyngeal dysphagia; similar function to 10/11/22 study. Oral phase notable for impaired A/P bolus transit with lingual pumping, oral residue and piecemeal swallows. Reduced oral control with premature  bolus spillage across consistencies (valleculae with solids, pyriforms with liquids). The following are mildly reduced in ROM: base of tongue retraction, hyolaryngeal elevation/excursion, epiglottic inversion, pharyngoesophageal segment opening during the swallow. Trace-mild pharyngeal residue, double swallows are effective when pt able to complete.     Laryngeal penetration:   Trace-mild before/during laryngeal penetration above the vocal cords occuring 2/2 reduced oral control, premature bolus spillage, delay in laryngeal closure when swallowing thin liquids in head neutral positionig + with incomplete chin tuck use. Pt needed max cues, including tactile/manual assist, to accurately complete chin tuck which was effective in elminating penetration.   No penetration or aspiration with slightly thick or puree     SLP Total Evaluation Time   Evaluation, videofluoroscopic eval of swallow function Minutes (25183) 18   SLP Goals   Therapy Frequency (SLP Eval) 5 times/week   SLP Predicted Duration/Target Date for Goal Attainment 03/12/24   SLP Goals Swallow   SLP: Safely tolerate diet without signs/symptoms of aspiration Easy to chew diet;Thin liquids;With use of compensatory swallow strategies;With assistance/supervision   SLP Discharge Planning   SLP Plan PO tolerance of SB6/1, train in chin tuck and advance to thin if able to complete, advanced soldis as appropriate   SLP Discharge Recommendation skilled nursing facility   SLP Rationale for DC Rec Acute on chronic dysphagia   SLP Brief overview of current status    Continue soft/bite sized solids, upgrade to slightly thick liquids (IDDSI 6, 1). Upright position, small bites/sips, alternate between solids/liquids for oral clearance, slow rate of intake. SLP to train in chin tuck, potential upgrades to thin if pt able to accurately/consistently complete -- deep penetration + risk for aspiration when not utilizing.     Total Session Time   Total Session Time (sum of timed  and untimed services) 18

## 2024-02-23 NOTE — CONSULTS
[facility] Consultation by [facility]    Hermilo Velasquez MRN# [id number]   Age: 89 year old YOB: 1932     Date of Admission:  2/19/2024  Date of Consult:    02/23/24    Reason for consult: Abnormal Finding Right BOT on PET imaging in pt with CA history       Requesting physician: Charlie Muñiz DO                           Chief Complaint:   Altered Mental Status and Shortness of Breath (/)              HPI:      Hermilo Velasquez is a 89 year old male with a history of lung cancer s/p wedge resection, recurrent c.diff, afib, COPD, PE, aortic stenosis s/p bioprosthetic heart valve, PATIENCE, CKD who is admitted on 2/19/2024 with worsening cough and increasing O2 needs with CT showing bilateral pneumonia.      Sepsis due to bilateral pneumonia  Loculated effusion of RLL, likely parapneumonic  CT shows increased consolidation of the RLL and new patchy infiltrates in the LLL concerning for progressive bilateral pneumonia. Also noted is loculated effusion on the right, progressed from prior. With increased O2 needs, new leukocytosis, procal of 21 and fever this is likely infectious source. Note history of dysphagia so aspiration pneumonia is a possibility though patient notes stability in his swallowing/eating.   -Cefepime and vancomycin IV initiated on admission, ID consulted, MRSA PCR negative, vancomycin IV discontinued, continued with cefepime and IV flagyl added.   -Reviewed with thoracic surgery, no need for chest tube and patient is s/p thoracentesis. Pleural Fluid clotted off and cell count/differential could not be completed.  Gram stain without organisms and culture negative so far, follow.  -leucocytosis resolved  -SLP eval, planned VFSS but patient not co operative due to delirium.  Meanwhile, on level 6 diet with mildly thick liquid.  -wean O2 as ableAbnormal      Dysphagia  -continue dysphagia diet  -speech consult as above.  -encourage PO  -difficult to maintain IV, will avoid  "IV infusion. If needed small IV boluses.      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 Planned for pet scan and biopsy of nodule on 1/9 was cancelled by the patient  - Outpatient follow-up with oncology.  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 also concern if causing aspiration and PNA.  - Appointment with ENT on 1/11 was cancelled to work up the lung first.  - Wife planned to make appointment with ENT after most recent admission.   - Will consult ENT.     Patient is unable to provide any specific detail.  He denies any throat pain.    Previous tests and diagnostic procedures: see \"Tests and Procedures\" and \"PFSH\".               Past Medical History:     Past Medical History:   Diagnosis Date    AAA (abdominal aortic aneurysm) (H24)     Adenocarcinoma, lung, 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 Vasques     Aortic stenosis     Severe AS, 9/2015 AVR - [facility] TRIFECTA Bovine bioprosthesis 25MM TF-25A    Atrial fibrillation (H)     9/2015 Paroxysmal post op Afib - discharged on Warfarin and a beta blocker    Bullous pemphigoid (H28)     CKD (chronic kidney disease) stage 3, GFR 30-59 ml/min (H)     COPD (chronic obstructive pulmonary disease) (H)     Deep vein thrombosis (H)     GERD (gastroesophageal reflux disease)     Heart murmur     PATIENCE (iron deficiency anemia)     Monoclonal gammopathy     plasmacyte prominent causing monoclonal gammopathy    Need for SBE (subacute bacterial endocarditis) prophylaxis     Neuropathy     Other and unspecified hyperlipidemia     Other malignant lymphomas     non hodgkin's lymphoma    Pulmonary embolism (H)     RBBB (right bundle branch block)     Severe sepsis with acute organ dysfunction (H) 11/16/2015    Unspecified hereditary and idiopathic peripheral neuropathy "                Past Surgical History:     Past Surgical History:   Procedure Laterality Date    APPENDECTOMY      ARTHROPLASTY KNEE Right 7/25/2022    Procedure: RIGHT TOTAL KNEE ARTHROPLASTY;  Surgeon: Giuliano Deshpande MD;  Location:  OR    AS TOTAL KNEE ARTHROPLASTY      BACK SURGERY      ESOPHAGOSCOPY, GASTROSCOPY, DUODENOSCOPY (EGD), COMBINED N/A 11/28/2015    Procedure: COMBINED ESOPHAGOSCOPY, GASTROSCOPY, DUODENOSCOPY (EGD);  Surgeon: Danis Castillo MD;  Location:  GI    ESOPHAGOSCOPY, GASTROSCOPY, DUODENOSCOPY (EGD), COMBINED N/A 7/26/2018    Procedure: COMBINED ESOPHAGOSCOPY, GASTROSCOPY, DUODENOSCOPY (EGD);;  Surgeon: Toby Dong DO;  Location:  GI    ESOPHAGOSCOPY, GASTROSCOPY, DUODENOSCOPY (EGD), COMBINED N/A 8/17/2020    Procedure: ESOPHAGOGASTRODUODENOSCOPY (EGD);  Surgeon: Herrera Henry MD;  Location:  GI    ESOPHAGOSCOPY, GASTROSCOPY, DUODENOSCOPY (EGD), COMBINED N/A 10/24/2020    Procedure: ESOPHAGOGASTRODUODENOSCOPY (EGD);  Surgeon: Vick Florentino MD;  Location:  GI    ESOPHAGOSCOPY, GASTROSCOPY, DUODENOSCOPY (EGD), COMBINED N/A 4/11/2022    Procedure: ESOPHAGOGASTRODUODENOSCOPY (EGD);  Surgeon: Vick Florentino MD;  Location:  GI    ESOPHAGOSCOPY, GASTROSCOPY, DUODENOSCOPY (EGD), COMBINED N/A 8/26/2022    Procedure: ESOPHAGOGASTRODUODENOSCOPY (EGD);  Surgeon: El Mcgovern MD;  Location:  GI    HERNIA REPAIR  2006    HERNIORRHAPHY VENTRAL  4/17/2013    Procedure: HERNIORRHAPHY VENTRAL;  VENTRAL HERNIA REPAIR WITH MESH;  Surgeon: Patel Guzman MD;  Location:  OR    KNEE SURGERY      arthroscopic right knee surgery     LOBECTOMY LUNG Right 3/26/2019    POSSIBLE LOBECTOMY LUNG per Dr. Vasques at [facility]    REPAIR LIGAMENT ANKLE  2/23/2012    Procedure:REPAIR LIGAMENT ANKLE; LEFT TARSAL TUNNEL RELEASE OF KNOT OF ARIEL RELEASE; Surgeon:SAUL PUENTE; Location:[facility] OR    REPLACE VALVE AORTIC N/A 9/3/2015    Procedure: REPLACE VALVE  AORTIC;  Surgeon: Antonino Mitchell MD;  Location: [facility] OR    ROTATOR CUFF REPAIR RT/LT      bilateral    SPINE SURGERY      3 spine surgeries    THORACOTOMY, WEDGE RESECTION LUNG, COMBINED Right 3/26/2019    RIGHT THORACOTOMY, WEDGE RESECTION, RIGHT LOWER LOBE LUNG NODULE,;  Surgeon: Jose A Vasques MD;  Location: [facility] OR    TONSILLECTOMY      TURP                 Social History:     Social History     Socioeconomic History    Marital status:      Spouse name: Not on file    Number of children: Not on file    Years of education: Not on file    Highest education level: Not on file   Occupational History    Not on file   Tobacco Use    Smoking status: Former     Packs/day: 2.00     Years: 55.00     Additional pack years: 0.00     Total pack years: 110.00     Types: Cigarettes     Quit date: 1998     Years since quittin.1    Smokeless tobacco: Never   Vaping Use    Vaping Use: Never used   Substance and Sexual Activity    Alcohol use: Not Currently    Drug use: No    Sexual activity: Not Currently     Partners: Female   Other Topics Concern    Parent/sibling w/ CABG, MI or angioplasty before 65F 55M? Not Asked     Service Not Asked    Blood Transfusions Not Asked    Caffeine Concern No     Comment: occ    Occupational Exposure Not Asked    Hobby Hazards Not Asked    Sleep Concern Not Asked    Stress Concern Not Asked    Weight Concern Not Asked    Special Diet No    Back Care Not Asked    Exercise No    Bike Helmet Not Asked    Seat Belt Yes    Self-Exams Not Asked   Social History Narrative    , [place]    Retired  - self employed     Social Determinants of Health     Financial Resource Strain: Low Risk  (2020)    Overall Financial Resource Strain (CARDIA)     Difficulty of Paying Living Expenses: Not hard at all   Food Insecurity: No Food Insecurity (2020)    Hunger Vital Sign     Worried About Running Out of Food in the Last  Year: Never true     Ran Out of Food in the Last Year: Never true   Transportation Needs: No Transportation Needs (5/12/2020)    PRAPARE - Transportation     Lack of Transportation (Medical): No     Lack of Transportation (Non-Medical): No   Physical Activity: Inactive (5/12/2020)    Exercise Vital Sign     Days of Exercise per Week: 0 days     Minutes of Exercise per Session: 0 min   Stress: No Stress Concern Present (5/12/2020)    [employer] - Occupational Stress Questionnaire     Feeling of Stress : Not at all   Social Connections: Moderately Isolated (5/12/2020)    Social Connection and Isolation Panel [NHANES]     Frequency of Communication with Friends and Family: Twice a week     Frequency of Social Gatherings with Friends and Family: Twice a week     Attends Muslim Services: Never     Active Member of Clubs or Organizations: No     Attends Club or Organization Meetings: Never     Marital Status:    Interpersonal Safety: Not At Risk (5/12/2020)    Humiliation, Afraid, Rape, and Kick questionnaire     Fear of Current or Ex-Partner: No     Emotionally Abused: No     Physically Abused: No     Sexually Abused: No   Housing Stability: Not on file               Family History:     Family History   Problem Relation Age of Onset    C.A.D. Father     Emphysema Father     Melanoma No family hx of     Skin Cancer No family hx of                Immunizations:     Immunization History   Administered Date(s) Administered    COVID-19 12+ (2023-24) (Pfizer) 10/19/2023    COVID-19 Bivalent 12+ (Pfizer) 11/23/2022, 06/20/2023    COVID-19 MONOVALENT 12+ (Pfizer) 01/20/2021, 02/10/2021, 10/15/2021    COVID-19 Monovalent 12+ (Pfizer 2022) 04/26/2022    DT (PEDS <7y) 07/25/1996    Influenza (H1N1) 01/20/2010    Influenza (High Dose) 3 valent vaccine 11/03/2014, 10/01/2015, 10/01/2016, 11/08/2016, 11/02/2017, 12/10/2018, 10/11/2019    Influenza (IIV3) PF 09/23/2009    Influenza Vaccine 65+  (Fluzone HD) 10/15/2020, 09/28/2021, 11/23/2022, 09/07/2023    Pneumo Conj 13-V (2010&after) 11/08/2014    Pneumococcal 23 valent 07/01/2014    Td (Adult), Adsorbed 06/01/2012    Zoster recombinant adjuvanted (SHINGRIX) 12/10/2018, 07/21/2019    Zoster vaccine, live 07/01/2014, 07/01/2019               Allergies:   Omeprazole, Pantoprazole, Prevacid [lansoprazole], Lasix [furosemide], Lidocaine, Penicillin g, and Penicillins          Medications:     Current Facility-Administered Medications:     acetaminophen (TYLENOL) tablet 650 mg, 650 mg, Oral, Q4H PRN **OR** acetaminophen (TYLENOL) Suppository 650 mg, 650 mg, Rectal, Q4H PRN, Charlie Muñiz DO    albuterol (PROVENTIL HFA/VENTOLIN HFA) inhaler, 2 puff, Inhalation, Q6H PRN, Areli Dvais MD    albuterol (PROVENTIL) neb solution 2.5 mg, 2.5 mg, Nebulization, 4x Daily, Areli Davis MD, 2.5 mg at 02/23/24 1232    albuterol (PROVENTIL) neb solution 2.5 mg, 3 mL, Nebulization, Q2H PRN, Charlie Muñiz DO    calcium carbonate (TUMS) chewable tablet 1,000 mg, 1,000 mg, Oral, 4x Daily PRN, Charlie Muñiz DO    ceFEPIme (MAXIPIME) 2 g vial to attach to  mL bag for ADULTS or 50 mL bag for PEDS, 2 g, Intravenous, Q12H, Charlie Muñiz DO, Last Rate: 0 mL/hr at 02/21/24 0926, 2 g at 02/23/24 0338    DULoxetine (CYMBALTA) DR capsule 40 mg, 40 mg, Oral, Daily, Areli Davis MD, 40 mg at 02/23/24 1226    famotidine (PEPCID) tablet 20 mg, 20 mg, Oral, Daily, Areli Davis MD, 20 mg at 02/23/24 0854    fludrocortisone (FLORINEF) tablet 0.1 mg, 0.1 mg, Oral, Daily, Areli Davis MD, 0.1 mg at 02/23/24 0853    gabapentin (NEURONTIN) capsule 400 mg, 400 mg, Oral, At Bedtime, Areli Davis MD, 400 mg at 02/22/24 2144    metroNIDAZOLE (FLAGYL) infusion 500 mg, 500 mg, Intravenous, Q12H, Venkatesh Rhodes MD, 500 mg at 02/23/24 0433    OLANZapine (zyPREXA) injection 2.5 mg, 2.5 mg, Intramuscular, Daily PRN, Areli Davis MD, 2.5 mg at 02/22/24 0911     "OLANZapine zydis (zyPREXA) ODT tab 5 mg, 5 mg, Oral, Daily PRN, Areli Davis MD    ondansetron (ZOFRAN ODT) ODT tab 4 mg, 4 mg, Oral, Q6H PRN **OR** ondansetron (ZOFRAN) injection 4 mg, 4 mg, Intravenous, Q6H PRN, Charlie Muñiz DO    prochlorperazine (COMPAZINE) injection 5 mg, 5 mg, Intravenous, Q6H PRN **OR** prochlorperazine (COMPAZINE) tablet 5 mg, 5 mg, Oral, Q6H PRN **OR** prochlorperazine (COMPAZINE) suppository 12.5 mg, 12.5 mg, Rectal, Q12H PRN, Charlie Muñiz DO    sodium chloride (PF) 0.9% PF flush 3 mL, 3 mL, Intracatheter, Q8H, Charlie Muñiz DO, 3 mL at 02/23/24 0906    sodium chloride (PF) 0.9% PF flush 3 mL, 3 mL, Intracatheter, q1 min prn, Charlie Muñiz DO    tamsulosin (FLOMAX) capsule 0.4 mg, 0.4 mg, Oral, Daily, Areli Davis MD, 0.4 mg at 02/23/24 0855    umeclidinium (INCRUSE ELLIPTA) 62.5 MCG/ACT inhaler 1 puff, 1 puff, Inhalation, Daily, Areli Davis MD, 1 puff at 02/21/24 1354    vancomycin (FIRVANQ) oral solution 125 mg, 125 mg, Oral, Q6H, Charlie Muñiz DO, 125 mg at 02/23/24 0852          Review of Systems:   Review Of Systems  Skin: negative  Eyes: negative  Ears/Nose/Throat: negative  Respiratory: No shortness of breath, dyspnea on exertion, cough, or hemoptysis  Cardiovascular: as above  Gastrointestinal: negative  Genitourinary: negative  Musculoskeletal: negative  Neurologic: obtunded  Psychiatric: negative  Hematologic/Lymphatic/Immunologic: negative  Endocrine: negative          Physical exam:   CONSTITUTION:    BP (!) 151/84 (BP Location: Right arm)   Pulse 104   Temp 98.2  F (36.8  C) (Oral)   Resp 16   Ht 1.778 m (5' 10\")   Wt 57.6 kg (126 lb 15.8 oz)   SpO2 98%   BMI 18.22 kg/m       General appearance:Well developed, well nourished and groomed. No apparent acute or chronic distress.    Ability to communicate: minimal verbalization.       HEAD, FACE, SALIVARY GLANDS AND TMJ:    Inspection of Head and Face: Normal contour and symmetry. No masses, " lesions, or significant scars observed.    Palpation/Percussion of the Face: No tenderness, deformity or instability.    Palpation of Parotid and Submaxillary glands: Normal.    Facial Mobility: Normal.       EYES: Ocular Motility: gaze appears conjugate in all positions; no evident nystagmus.       EAR, NOSE, MOUTH AND THROAT:    Pinnas and External Nose: Normal.    Nasal Interior:  Mild rhinitis  Lips, Teeth and Gums: Dentures   Oral Cavity and Oropharynx: dry mucous membranes. .    Base of tongue, Pharyngeal walls, Vallecula and Pyriform Sinuses: Unable to complete mirror examination because of hyperactive gag.    Larynx: Unable to visualize with mirror because of hyperactive gag.    Nasopharynx examination: Could not visualize with the mirror due to gag.       NECK AND THYROID:    Neck: normal symmetry; trachea midline; normal laryngeal crepitation; no adenopathy; no neck masses; no skin lesions; no scars.    Thyroid: normal size; no masses or tenderness.       LYMPH NODES: Neck Nodes: normal, no adenopathy.       NEUROLOGIC:    Cranial nerves: Cranial nerves II-XII grossly intact and symmetrical.      Nasopharyngoscopy, Flexible Fiberoptic; Diagnostic     General Findings: Asymmetric fullness/non-ulcerative mass right BOT  Nasopharynx - normal  BOT - Normal with right asymmetry/mass  Vallecula - Normal  Epiglottis - Normal  FVCs- Normal  TVCs- Normal  Arytenoids -Normal  Interarytenoid space - Normal  Piriform Sinuses - Normal            Data:     Lab Results   Component Value Date    WBC 8.1 02/23/2024    HGB 8.9 (L) 02/23/2024    HCT 28.6 (L) 02/23/2024     (L) 02/23/2024     02/23/2024    POTASSIUM 3.2 (L) 02/23/2024    POTASSIUM 3.2 (L) 02/23/2024    CHLORIDE 108 (H) 02/23/2024    CO2 26 02/23/2024    BUN 17.5 02/23/2024    CR 0.80 02/23/2024    GLC 98 02/23/2024    SED 43 (H) 10/06/2022    DD 4.6 (H) 03/30/2020    NTBNPI 30,280 (H) 02/19/2024    NTBNP 1,233 (H) 08/28/2020    TROPI 0.513 (HH)  04/14/2021    AST 17 02/19/2024    ALT 7 02/19/2024    ALKPHOS 93 02/19/2024    BILITOTAL 0.9 02/19/2024    DAMIAN 16 01/16/2024    INR 1.37 (H) 02/19/2024           Assessment and Plan:   Patient has a history of lung adenocarcinoma from 2019 and it is reported that there is positive FDG positive mass in the right tongue base.  Nasopharyngoscopy does reveal an asymmetric masslike lesion in the right base of tongue.  This is not ulcerative or cellulitic.  I am unable to find the reported PET scan however for further detail.  Recommend outpatient follow-up with [place] [facility] to evaluate candidacy for treatment were this to be positive for tongue base carcinoma.  Interval PET imaging with diagnostic CT neck with contrast for further characterization would be necessary and recommended before considering biopsy.  Ultimately, direct laryngoscopy with biopsy in the operating room would be necessary to obtain tissue diagnosis from this site.  Would not recommend any further imaging or surgical intervention until determination from medical oncology appropriateness for any chemotherapeutic management.  Findings in the base of tongue would not be treated surgically at this time.  Please call with any questions or concerns.  Recommend ENT follow-up as outpatient after consultation with [facility].      Attestation:  I have reviewed today's vital signs, notes, medications, medication allergies, and PFSH/ROSlabs and imaging.    Nadeem Magana MD

## 2024-02-23 NOTE — PLAN OF CARE
Goal Outcome Evaluation:    A&O to self. Very agitated throughout most of the day. Refusing all PO medications and replacement of PIV until family present at bedside to convince patient to have another one replaced. Patient requried sitter at bedside given plans following care conference for full restorative cares as patient continues to pull at lines, remove O2 tubing and telemetry. Poor PO intake, did have some fluids by mouth this evening. K protocol ordered, patient did have 1st dose per protocol, refused second. Cont of urine. Pallative care will continue to follow and see patient at bedside tomorrow.

## 2024-02-23 NOTE — PROGRESS NOTES
Pt was very tired at start of shift. Ok up for several hours in the middle of the day. Ambulated with assist of 1 to the bathroom. Then got tired again and has been sleeping. He had a video swallow study today. See speech therapist note. Pt pockets his medications. Requires repeated prompting. With all PO intake. Minimal PO intake this shift despite attempts.

## 2024-02-24 NOTE — PLAN OF CARE
Problem: Risk for Delirium  Goal: Optimal Coping  Outcome: Not Progressing  Goal: Improved Behavioral Control  Outcome: Not Progressing  Intervention: Minimize Safety Risk  Recent Flowsheet Documentation  Taken 2/23/2024 1615 by Charlie Dupree RN  Communication Enhancement Strategies:   extra time allowed for response   one-step directions provided  Enhanced Safety Measures:   floor mats   room near unit station  Goal: Improved Attention and Thought Clarity  Outcome: Not Progressing  Intervention: Maximize Cognitive Function  Recent Flowsheet Documentation  Taken 2/23/2024 1615 by Charlie Dupree RN  Sensory Stimulation Regulation:   care clustered   quiet environment promoted   visual stimulation minimized   auditory stimulation minimized  Reorientation Measures: clock in view  Goal: Improved Sleep  Outcome: Not Progressing    Neuro:  A&Ox0, lethargic this shift, whisper garbled speech, arouses to voice  Pain: denied  CV:  Shook head no, tachycardic at times, NSR with PVC on tele or sinus tach with PVC  Pulm: On 3L NC, LS diminished  GI/: BS active, no BM this shift, incontinet of urine, soft bite size diet with thickened liquids  Skin: skin tear arm and flank mepilex in place, scattered bruising and scabbing  Lines: New PIV placed L hand   Drips: intermittent AX infusions  Additional:  sitter in room    Plan (Upcoming Events): Continued antibiotics    Bedside Shift Report Completed : yes  Bedside Safety Check Completed: yes

## 2024-02-24 NOTE — PROGRESS NOTES
[facility]  Hospitalist Progress Note    Assessment & Plan   Hermilo Velasquez is a 89 year old male with a history of lung cancer s/p wedge resection, recurrent c.diff, afib, COPD, PE, aortic stenosis s/p bioprosthetic heart valve, PATIENCE, CKD who is admitted on 2/19/2024 with worsening cough and increasing O2 needs with CT showing bilateral pneumonia with loculated effusion.      Sepsis due to bilateral pneumonia  Loculated effusion of RLL, likely Parapneumonic S/p Thoracentesis 2/20/24  CT showed increased consolidation of the RLL and new patchy infiltrates in the LLL concerning for progressive bilateral pneumonia. Also noted is loculated effusion on the right, progressed from prior. With increased O2 needs, new leukocytosis, procal of 21 and fever this is likely infectious source. Note history of dysphagia so aspiration pneumonia is a possibility though patient notes stability in his swallowing/eating. Patient was initially started on Cefepime and vancomycin. ID was consulted and patient was transitioned to IV Cefepime and Flagyl. Case was reviewed with thoracic surgery who felt that there was no need for chest tube and patient is s/p thoracentesis.     - Blood Cx: No growth    - Pleural Fluid clotted off and cell count/differential could not be completed   - Gram stain without organisms and culture negative so far,     - Spo2 goal > 88%     - Wean as able      - Continue IV Cefepime and Flagyl (Day 6)      - ID following, appreciate recs     - If pleural fluid cultures stay negative then 7 day course of antibiotics for pneumonia   - If pleural fluid cultures come back positive then reassess    - SLP Following   - S/p VFSS 2/23/24   - Showed moderate oral dysphagia, mild pharyngeal dysphagia   - Continue modified diet     Acute Encephalopathy due to Sepsis  Suspect dementia with occasional agitation  Unclear how far off baseline, note he has similar issues during his last hospital stay  with negative head CT and brain MRI. Head CT in the ED on 2/19 negative. Also noted to have significant cognitive deficits and memory issues per previous hospital stay.   -Treating sepsis as above.  - PRN zyprexa  - Previous hospitalist discussed with family. They requested decreasing dose of gabapentin and cymbalta.      Dysphagia  - Continue dysphagia diet  - Speech consult as above.  - Encourage PO  - Difficult to maintain IV, will avoid IV infusion. If needed small IV boluses.      Hypokalemia  - Replace per protocol  - Check mag and replace if needed    COPD   Chronic hypoxic respiratory failure on 3L O2  Does not have significant wheezing, do not think he has COPD exacerbation at this time  - Continue PTA inhalers  - Wean O2 as above  - Treat for pneumonia as above    Recurrent C diff Colitis  Diarrhea reported on January 14 prior to last admission admission. Previously recommended that the patient follow up at [facility] for fecal transplantation, but he has not. ID note from 11/28/23 had recommended a prolonged taper of vancomycin. Previously he had been recommended to continue po vancomycin indefinitely until fecal transplant. *oral vancomycin was discontinued during his last admission in January due to lack of diarrhea. Vanco restarted during last admission.   - Continue vanco PO (refusing at times) twice daily.  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 Planned for pet scan and biopsy of nodule on 1/9 was cancelled by the patient  - Outpatient follow-up with oncology.  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 also concern if causing aspiration and PNA.  - Appointment with ENT on 1/11 was cancelled to work up the lung first.  - Wife planned to make  "appointment with ENT after most recent admission.   - D/W ENT  2/23: \"Interval PET imaging with diagnostic CT neck with contrast for further characterization would be necessary and recommended before considering biopsy. Ultimately, direct laryngoscopy with biopsy in the operating room would be necessary to obtain tissue diagnosis from this site. Would not recommend any further imaging or surgical intervention until determination from medical oncology appropriateness for any chemotherapeutic management. Findings in the base of tongue would not be treated surgically at this time. 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    Hx of VTE/PE  History of subsegmental PE on 11/25/23  - Not on ac due to gi bleed. Hx of IVC filter in the past, not clear if it was removed.     Paroxysmal Atrial fibrillation  Not on anticoagulation due to anemia thought to be due to a GI bleed. Has history of angioextasia and diverticulosis. Tachy on admission, driven by infection.   -Tachycardia has improved although intermittently tachy due to agitation and likely dehydration/poor PO, continue to monitor on telemetry     Anemia  Thrombocytopenia  Chronic anemia, stable at baseline 9  - Drop in Platelet count suspect related to sepsis. Patient runs low count  intermittently, monitor     Severe aortic stenosis s/p bioprosthetic aortic valve  HTN  HLD  Elevated BNP  Chronically elevated troponin  Hx of orthostatic hypotension  Troponin initially 107, 90 on recheck. Both troponin and BNP (30k) higher than previous. Suspect due to sepsis.   - Telemetry, repeat TTE pending  - Remains on florinef for hypotension     BPH  - Continue PTA Flomax. 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   - " "Creatinine back WNL     Depression/Anxiety/Pain  - PTA Duloxetine and gabapentin doses reduced per family request      Deep Tissue Pressure Injury to sacrum and bilateral buttocks,POA  - WOC consulted    Clinically Significant Risk Factors        # Hypokalemia: Lowest K = 3.1 mmol/L in last 2 days, will replace as needed       # Hypoalbuminemia: Lowest albumin = 3.2 g/dL at 2/19/2024  8:54 PM, will monitor as appropriate   # Thrombocytopenia: Lowest platelets = 126 in last 2 days, will monitor for bleeding   # Hypertension: Noted on problem list    # Chronic heart failure with preserved ejection fraction: heart failure noted on problem list and last echo with EF >50%       # Cachexia: Estimated body mass index is 17.21 kg/m  as calculated from the following:    Height as of this encounter: 1.778 m (5' 10\").    Weight as of this encounter: 54.4 kg (119 lb 14.9 oz).   # Severe Malnutrition: based on nutrition assessment      # Financial/Environmental Concerns:    # COPD: noted on problem list          Diet: Combination Diet Regular Diet Adult; Soft and Bite Sized Diet (level 6); Slightly Thick (level 1)     DVT Prophylaxis: Pneumatic Compression Devices   Suazo Catheter: Not present  Lines: None     Cardiac Monitoring: ACTIVE order. Indication: Tachyarrhythmias, acute (48 hours)  Code Status: Full Code      Disposition Plan       Expected Discharge Date: 02/26/2024,  3:00 PM      Discharge Comments: Pallative Care Conference 2/22- Full restorative care, goal to return home      Entered: Radha Dean MD 02/24/2024, 2:07 PM     Care Team Updated: Yes    Disposition: Will need one more day of IV abx pending cultures remain negative. Will need PT recommendations and final ID recommendations before discharge can occur. Patient may be ready to discharge in the coming days.    Radha Dean MD  Hospitalist Service   [facility]  Securely message with the Vocera Web Console (learn more " here)  Text page via [facility] Paging/Directory         Medical Decision Making       60 MINUTES SPENT BY ME on the date of service doing chart review, history, exam, documentation & further activities per the note.           Interval History     Assumed care this morning    Patient has sitter at bedside as he was attempting to take out his IV.    This morning the patient states that he is doing fine. Seems confused at times. Oriented to self. Voices no complaints.     Per nursing he is better than he was yesterday.     -Data reviewed today: I reviewed all new labs and imaging results over the last 24 hours.     Physical Exam   Temp: 97.8  F (36.6  C) Temp src: Oral BP: 135/66 Pulse: 78   Resp: 18 SpO2: 97 % O2 Device: Nasal cannula Oxygen Delivery: 2 LPM  Vitals:    02/22/24 1406 02/23/24 0630 02/24/24 0554   Weight: 61 kg (134 lb 7.7 oz) 57.6 kg (126 lb 15.8 oz) 54.4 kg (119 lb 14.9 oz)     Vital Signs with Ranges  Temp:  [97.8  F (36.6  C)-98.3  F (36.8  C)] 97.8  F (36.6  C)  Pulse:  [78-98] 78  Resp:  [17-18] 18  BP: (120-142)/(66-85) 135/66  SpO2:  [95 %-98 %] 97 %  I/O last 3 completed shifts:  In: 745 [P.O.:745]  Out: -       Constitutional: Awake, alert, cooperative, no apparent distress.    HEENT: PERRL, Normocephalic, without obvious abnormality, atraumatic, oral pharynx with moist mucus membranes  Pulmonary: Clear breath sounds bilaterally  Cardiovascular: Regular rate and rhythm, normal S1 and S2  GI: Normal bowel sounds, soft, non-distended, non-tender.  Skin/Integumen: Visualized skin appeared clear.  Neuro: CN II-XII grossly intact.  Psych:  Alert and oriented x 1  Extremities: No lower extremity edema noted      Medications      albuterol  2.5 mg Nebulization 4x Daily    ceFEPIme  2 g Intravenous Q12H    DULoxetine  40 mg Oral Daily    famotidine  20 mg Oral Daily    fludrocortisone  0.1 mg Oral Daily    gabapentin  400 mg Oral At Bedtime    metroNIDAZOLE  500 mg Intravenous Q12H    sodium chloride (PF)   3 mL Intracatheter Q8H    tamsulosin  0.4 mg Oral Daily    umeclidinium  1 puff Inhalation Daily    vancomycin  125 mg Oral Q6H       Data   Recent Labs   Lab 02/24/24  0753 02/23/24  1730 02/23/24  0720 02/22/24  2309 02/22/24  0811 02/21/24  0814 02/20/24  1019 02/20/24  0651 02/19/24  2105 02/19/24  2054   WBC  --   --  8.1  --  9.7 14.6*  --    < >  --  17.1*   HGB  --   --  8.9*  --  9.1* 9.9*  --    < >  --  10.0*   MCV  --   --  83  --  83 84  --    < >  --  83   PLT  --   --  126*  --  122* 120*  --    < >  --  162   INR  --   --   --   --   --   --   --   --  1.37*  --    NA  --   --  144  --  143 141  --    < >  --  138   POTASSIUM 3.8 3.4 3.2*  3.2*   < > 2.9* 3.6  --    < >  --  3.9   CHLORIDE  --   --  108*  --  107 106  --    < >  --  99   CO2  --   --  26  --  27 28  --    < >  --  26   BUN  --   --  17.5  --  20.9 23.6*  --    < >  --  19.4   CR  --   --  0.80  --  0.85 1.01  --    < >  --  1.51*   ANIONGAP  --   --  10  --  9 7  --    < >  --  13   AMRITA  --   --  8.9  --  8.8 8.6  --    < >  --  8.6   GLC  --   --  98  --  97 120*  --    < >  --  178*   ALBUMIN  --   --   --   --   --   --   --   --   --  3.2*   PROTTOTAL  --   --   --   --   --   --  6.0*  --   --  6.8   BILITOTAL  --   --   --   --   --   --   --   --   --  0.9   ALKPHOS  --   --   --   --   --   --   --   --   --  93   ALT  --   --   --   --   --   --   --   --   --  7   AST  --   --   --   --   --   --   --   --   --  17    < > = values in this interval not displayed.       No results found for this or any previous visit (from the past 24 hour(s)).

## 2024-02-24 NOTE — PLAN OF CARE
Goal Outcome Evaluation:    Alert to self. VSS ex HR. On 2 L O2 NC. Denies pain. PIV SL w/ intermittent IV abx. Tele Afib w/ RVR. Up with Ax1 w/ GB & walker. Incontinent at times. Fluid encouraged. Sitter at bedside. Will continue to monitor.

## 2024-02-25 NOTE — PLAN OF CARE
Goal Outcome Evaluation:    Alert to self. Confused. VSS ex HR. On 2 L O2 NC. Ax1 w/ GB & walker. Intermittently incontinent. Mepilex on coccyx. Denies pain. PIV SL w/ intermittent IV abx. Tele Afib w/ RVR. Sitter at bedside. Will continue to monitor.

## 2024-02-25 NOTE — PROGRESS NOTES
Date/Time 02/24/24    Summary: Hx of lung cancer, recurrent cdiff, afib, COPD, PE, CKD & PATIENCE admitted with worsening cough and increasing oxygen need.    Diagnosis: Hypoxia, pneumonia of both  lower lobe    Mental Status: Alert & Oriented x 2    VS: VSS on 2 liters of o    Activity/dangle SBA  with walker and gait belt    Diet: Soft bites size, thick it liquid    Pain: Denied     Suazo/Voiding: Continent    Tele/Restraints/Iso: Afib    D/C Date: Pending    Other Info:Pt has a sitter at bedside

## 2024-02-25 NOTE — PROGRESS NOTES
[facility]    Infectious Disease Progress Note    Date of Service (when I saw the patient): 02/25/2024     Assessment & Plan   Hermilo Velasquez is a 89 year old who was admitted on 2/19/2024.     Impression:  89 yo with a history of lung cancer s/p wedge resection  History of recurrent c.diff  Afib  COPD, PE  Aortic stenosis s/p bioprosthetic heart valve  CKD .   Admitted with cough, shortness of breath, fever  On IV vanco and cefepime and also on oral vanco   PCN listed as childhood allergy      Recommendations:   Continue on oral vanco, seems like was on some sort of taper for a while now, make the does BID for prophylaxis last positive C diff was 2 months ago continue indefinitely   Day 7 of IV antibiotics today no positive micro enough antibiotics for pneumonia can stop  Loculated pleural effusion s/p thora cultures are no growth  Will sign off please call if questions    Venkatesh Rhodes MD    Interval History   Tolerating antibiotics ok   Noted refusal of IV antibiotic last night  Discussed with bedside nursing  No new rashes or issues with antibiotics   Labs reviewed   No changes to past medical, social or family history   Noted goals of care conversation       Physical Exam   Temp: 98.5  F (36.9  C) Temp src: Axillary BP: 124/74 Pulse: 116   Resp: 22 SpO2: 93 % O2 Device: Oxymask Oxygen Delivery: 3 LPM  Vitals:    02/23/24 0630 02/24/24 0554 02/25/24 0643   Weight: 57.6 kg (126 lb 15.8 oz) 54.4 kg (119 lb 14.9 oz) 57 kg (125 lb 10.6 oz)     Vital Signs with Ranges  Temp:  [97.5  F (36.4  C)-98.5  F (36.9  C)] 98.5  F (36.9  C)  Pulse:  [] 116  Resp:  [18-22] 22  BP: (124-135)/(73-83) 124/74  SpO2:  [85 %-98 %] 93 %    Constitutional: on 3 L O2s  Lungs: Coarse bilateral   Cardiovascular: irregular  Abdomen:  soft, non-distended, non-tender  Skin: No rashes, no cyanosis, no edema  Other:    Medications      albuterol  2.5 mg Nebulization 4x Daily    DULoxetine  40 mg Oral Daily     famotidine  20 mg Oral Daily    fludrocortisone  0.1 mg Oral Daily    gabapentin  400 mg Oral At Bedtime    metroNIDAZOLE  500 mg Intravenous Q12H    sodium chloride (PF)  3 mL Intracatheter Q8H    tamsulosin  0.4 mg Oral Daily    umeclidinium  1 puff Inhalation Daily    vancomycin  125 mg Oral Q6H       Data   All microbiology laboratory data reviewed.  Recent Labs   Lab Test 02/25/24  0717 02/23/24  0720 02/22/24  0811   WBC 7.9 8.1 9.7   HGB 9.4* 8.9* 9.1*   HCT 31.3* 28.6* 29.1*   MCV 86 83 83    126* 122*     Recent Labs   Lab Test 02/23/24  0720 02/22/24  0811 02/21/24  0814   CR 0.80 0.85 1.01     Recent Labs   Lab Test 10/06/22  1526   SED 43*     Recent Labs   Lab Test 04/14/21  0815 04/13/21  2054 04/13/21  2038 09/13/20  1329 09/13/20  1256 08/15/20  1451 03/22/20  1950 03/22/20  1941 03/22/20  1935   CULT Canceled, Test credited  >10 Squamous epithelial cells/low power field indicates oral contamination. Please   recollect.  *  Notification of test cancellation was given to  Elva Ospina RN 1139 4/14/20 by AM   No growth No growth No growth No growth No growth  No growth No growth Cultured on the 4th day of incubation:  Staphylococcus capitis  Identification obtained by MALDI-TOF mass spectrometry research use only database. Test   characteristics determined and verified by the [facility]   ([facility]) [place], [place].  *  Critical Value/Significant Value, preliminary result only, called to and read back by  ANNABELLE CABRERA RN  03.26.20 CF    (Note)  POSITIVE for Staphylococci other than S.aureus, S.epidermidis and  S.lugdunensis, by [employer] nucleic acid test.  Coagulase-negative staphylococci are the most common venipuncture or  collection associated skin CONTAMINANTS grown in blood cultures.  Final identification and antimicrobial susceptibility testing will be  verified by standard methods.    Specimen tested with [employer],  gram-positive blood culture  nucleic acid test for the following targets: Staph aureus, Staph  epidermidis, Staph lugdunensis, other Staph species, Enterococcus  faecalis, Enterococcus faecium, Streptococcus species, S. agalactiae,  S. anginosus grp., S. pneumoniae, S. pyogenes, Listeria sp., mecA  (methicillin resistance) and Samanta/B (vancomycin resistance).    Critical Value/Significant Value called to and read back by Doreen Silverman RN on 3.26.20 at 0727. bw   No growth       All cultures:  Recent Labs   Lab 02/20/24  1336 02/19/24  2105 02/19/24 2054   CULTURE No Growth No Growth No Growth      Blood culture:  Results for orders placed or performed during the hospital encounter of 02/19/24   Blood Culture Peripheral Blood    Specimen: Peripheral Blood   Result Value Ref Range    Culture No Growth    Blood Culture Peripheral Blood    Specimen: Peripheral Blood   Result Value Ref Range    Culture No Growth    Results for orders placed or performed during the hospital encounter of 11/25/23   Blood Culture Peripheral Blood    Specimen: Peripheral Blood   Result Value Ref Range    Culture No Growth    Blood Culture Peripheral Blood    Specimen: Peripheral Blood   Result Value Ref Range    Culture No Growth    Results for orders placed or performed during the hospital encounter of 09/12/23   Blood Culture Peripheral Blood    Specimen: Peripheral Blood   Result Value Ref Range    Culture No Growth    Blood Culture Peripheral Blood    Specimen: Peripheral Blood   Result Value Ref Range    Culture No Growth    Results for orders placed or performed during the hospital encounter of 11/06/22   Blood Culture Wrist, Left    Specimen: Wrist, Left; Blood   Result Value Ref Range    Culture No Growth    Blood Culture Peripheral Blood    Specimen: Peripheral Blood   Result Value Ref Range    Culture No Growth    Results for orders placed or performed during the hospital encounter of 10/09/22   Blood Culture Peripheral Blood     Specimen: Peripheral Blood   Result Value Ref Range    Culture No Growth    Blood Culture Peripheral Blood    Specimen: Peripheral Blood   Result Value Ref Range    Culture No Growth    Results for orders placed or performed during the hospital encounter of 04/10/22   Blood Culture Line, venous    Specimen: Line, venous; Blood   Result Value Ref Range    Culture No Growth    Blood Culture Peripheral Blood    Specimen: Peripheral Blood   Result Value Ref Range    Culture No Growth    Results for orders placed or performed during the hospital encounter of 04/13/21   Blood culture    Specimen: Blood    Left Arm   Result Value Ref Range    Specimen Description Blood Left Arm     Culture Micro No growth    Blood culture    Specimen: Blood    Right Arm   Result Value Ref Range    Specimen Description Blood Right Arm     Culture Micro No growth    Results for orders placed or performed during the hospital encounter of 09/13/20   Blood culture    Specimen: Blood    Right Arm   Result Value Ref Range    Specimen Description Blood Right Arm     Culture Micro No growth    Blood culture    Specimen: Blood    Left Arm   Result Value Ref Range    Specimen Description Blood Left Arm     Culture Micro No growth    Results for orders placed or performed during the hospital encounter of 08/15/20   Blood culture    Specimen: Blood    Left Arm   Result Value Ref Range    Specimen Description Blood Left Arm     Culture Micro No growth    Blood culture    Specimen: Blood    Right Arm   Result Value Ref Range    Specimen Description Blood Right Arm     Culture Micro No growth    Results for orders placed or performed during the hospital encounter of 03/22/20   Blood culture    Specimen: Blood    Right Arm   Result Value Ref Range    Specimen Description Blood Right Arm     Culture Micro (A)      Cultured on the 4th day of incubation:  Staphylococcus capitis  Identification obtained by MALDI-TOF mass spectrometry research use only  database. Test   characteristics determined and verified by the [facility]   ([facility]) [place], [place].      Culture Micro       Critical Value/Significant Value, preliminary result only, called to and read back by  ANNABELLE CABRERA RN  03.26.20 CF      Culture Micro       (Note)  POSITIVE for Staphylococci other than S.aureus, S.epidermidis and  S.lugdunensis, by [employer] multiplex nucleic acid test.  Coagulase-negative staphylococci are the most common venipuncture or  collection associated skin CONTAMINANTS grown in blood cultures.  Final identification and antimicrobial susceptibility testing will be  verified by standard methods.    Specimen tested with [employer] multiplex, gram-positive blood culture  nucleic acid test for the following targets: Staph aureus, Staph  epidermidis, Staph lugdunensis, other Staph species, Enterococcus  faecalis, Enterococcus faecium, Streptococcus species, S. agalactiae,  S. anginosus grp., S. pneumoniae, S. pyogenes, Listeria sp., mecA  (methicillin resistance) and Samanta/B (vancomycin resistance).    Critical Value/Significant Value called to and read back by Doreen Silverman RN on 3.26.20 at 0727. bw         Susceptibility    Staphylococcus capitis - SOCRATES     Ciprofloxacin <=0.5 Susceptible ug/mL     Clindamycin <=0.25 Susceptible ug/mL     Erythromycin 0.5 Susceptible ug/mL     Gentamicin <=0.5 Susceptible ug/mL     Levofloxacin 0.5 Susceptible ug/mL     Oxacillin <=0.25 Susceptible ug/mL     Tetracycline <=1 Susceptible ug/mL     Vancomycin <=0.5 Susceptible ug/mL   Blood culture    Specimen: Blood    Left Arm   Result Value Ref Range    Specimen Description Blood Left Arm     Culture Micro No growth    Results for orders placed or performed during the hospital encounter of 12/22/19   Blood culture    Specimen: Blood    Right Arm   Result Value Ref Range    Specimen Description Blood Right Arm     Special Requests Aerobic and anaerobic bottles  received     Culture Micro (A)      Cultured on the 2nd day of incubation:  Staphylococcus hominis      Culture Micro       Critical Value/Significant Value, preliminary result only, called to and read back by  DAVID GARCIA RN @1015 12/24/19. SCG      Culture Micro       (Note)  POSITIVE for Staphylococci other than S.aureus, S.epidermidis and  S.lugdunensis, by [employer] multiplex nucleic acid test.  Coagulase-negative staphylococci are the most common venipuncture or  collection associated skin CONTAMINANTS grown in blood cultures.  Final identification and antimicrobial susceptibility testing will be  verified by standard methods.    Specimen tested with [employer] multiplex, gram-positive blood culture  nucleic acid test for the following targets: Staph aureus, Staph  epidermidis, Staph lugdunensis, other Staph species, Enterococcus  faecalis, Enterococcus faecium, Streptococcus species, S. agalactiae,  S. anginosus grp., S. pneumoniae, S. pyogenes, Listeria sp., mecA  (methicillin resistance) and Samanta/B (vancomycin resistance).    Critical Value/Significant Value called to and read back by Aurora Giron RN at 8309 12.24.19.DK         Susceptibility    Staphylococcus hominis - SOCRATES     Ciprofloxacin <=0.5 Susceptible ug/mL     Clindamycin* <=0.25 Susceptible ug/mL      * This isolate DOES NOT demonstrate inducible clindamycin resistance in vitro. Clindamycin is susceptible and could be used when indicated, however, erythromycin is resistant and should not be used.     Erythromycin >=8 Resistant ug/mL     Gentamicin <=0.5 Susceptible ug/mL     Levofloxacin <=0.12 Susceptible ug/mL     Oxacillin <=0.25 Susceptible ug/mL     Tetracycline 2 Susceptible ug/mL     Vancomycin <=0.5 Susceptible ug/mL   Blood culture    Specimen: Blood    Right Hand   Result Value Ref Range    Specimen Description Blood Right Hand     Special Requests Aerobic and anaerobic bottles received     Culture Micro No growth    Results for  orders placed or performed during the hospital encounter of 05/13/19   Blood culture    Specimen: Arm, Left; Blood    Left Arm   Result Value Ref Range    Specimen Description Blood Left Arm     Special Requests Aerobic and anaerobic bottles received     Culture Micro No growth    Blood culture    Specimen: Arm, Left; Blood    Left Arm   Result Value Ref Range    Specimen Description Blood Left Arm     Special Requests Aerobic and anaerobic bottles received     Culture Micro No growth      *Note: Due to a large number of results and/or encounters for the requested time period, some results have not been displayed. A complete set of results can be found in Results Review.      Urine culture:  Results for orders placed or performed during the hospital encounter of 01/15/24   Urine Culture    Specimen: Urine, Clean Catch   Result Value Ref Range    Culture <10,000 CFU/mL Mixture of Urogenital Sylvia    Results for orders placed or performed during the hospital encounter of 10/09/22   Urine Culture    Specimen: Urine, Midstream   Result Value Ref Range    Culture No Growth    Results for orders placed or performed during the hospital encounter of 04/10/22   Urine Culture    Specimen: Urine, Catheter   Result Value Ref Range    Culture No Growth    Results for orders placed or performed during the hospital encounter of 03/22/20   Urine Culture    Specimen: Catheterized Urine   Result Value Ref Range    Specimen Description Catheterized Urine     Special Requests Specimen received in preservative     Culture Micro No growth      *Note: Due to a large number of results and/or encounters for the requested time period, some results have not been displayed. A complete set of results can be found in Results Review.

## 2024-02-25 NOTE — PROGRESS NOTES
[facility]  Hospitalist Progress Note    Assessment & Plan   Hermilo Velasquez is a 89 year old male with a history of lung cancer s/p wedge resection, recurrent c.diff, afib, COPD, PE, aortic stenosis s/p bioprosthetic heart valve, PATIENCE, CKD who is admitted on 2/19/2024 with worsening cough and increasing O2 needs with CT showing bilateral pneumonia with loculated effusion.      Sepsis due to Bilateral Pneumonia  Loculated effusion of RLL, likely Parapneumonic S/p Thoracentesis 2/20/24  CT showed increased consolidation of the RLL and new patchy infiltrates in the LLL concerning for progressive bilateral pneumonia. Also noted is loculated effusion on the right, progressed from prior. With increased O2 needs, new leukocytosis, procal of 21 and fever this is likely infectious source. Note history of dysphagia so aspiration pneumonia is a possibility though patient notes stability in his swallowing/eating. Patient was initially started on Cefepime and vancomycin. ID was consulted and patient was transitioned to IV Cefepime and Flagyl. Case was reviewed with thoracic surgery who felt that there was no need for chest tube and patient is s/p thoracentesis.     - Blood Cx: No growth    - Pleural Fluid clotted off and cell count/differential could not be completed   - Gram stain without organisms and culture negative so far,     - Spo2 goal > 88%     - Wean as able      - Continue IV Cefepime and Flagyl (Day 7 today)    - Can likely discontinue tomorrow pending cultures continue to be negative based off of ID recommendations     - ID following, appreciate recs     - If pleural fluid cultures stay negative then 7 day course of antibiotics for pneumonia   - If pleural fluid cultures come back positive then reassess    - SLP Following   - S/p VFSS 2/23/24   - Showed moderate oral dysphagia, mild pharyngeal dysphagia   - Continue modified diet     Acute Encephalopathy due to Sepsis  Suspect dementia  with occasional agitation  Unclear how far off baseline, note he has similar issues during his last hospital stay with negative head CT and brain MRI. Head CT in the ED on 2/19 negative. Also noted to have significant cognitive deficits and memory issues per previous hospital stay.   -Treating sepsis as above.  - PRN zyprexa  - Previous hospitalist discussed with family. They requested decreasing dose of gabapentin and cymbalta.      Dysphagia  - Continue dysphagia diet  - Speech consult as above.  - Encourage PO  - Difficult to maintain IV, will avoid IV infusion. If needed small IV boluses.      Hypokalemia  - Replace per protocol  - Check mag and replace if needed    COPD   Chronic hypoxic respiratory failure on 3L O2  Does not have significant wheezing, do not think he has COPD exacerbation at this time  - Continue PTA inhalers  - Wean O2 as above  - Treat for pneumonia as above    Recurrent C diff Colitis  Diarrhea reported on January 14 prior to last admission admission. Previously recommended that the patient follow up at [facility] for fecal transplantation, but he has not. ID note from 11/28/23 had recommended a prolonged taper of vancomycin. Previously he had been recommended to continue po vancomycin indefinitely until fecal transplant. *oral vancomycin was discontinued during his last admission in January due to lack of diarrhea. Vanco restarted during last admission.   - Continue vanco PO (refusing at times) twice daily.  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 Planned for pet scan and biopsy of nodule on 1/9 was cancelled by the patient  - Outpatient follow-up with oncology.  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 also concern if causing  "aspiration and PNA.  - Appointment with ENT on 1/11 was cancelled to work up the lung first.  - Wife planned to make appointment with ENT after most recent admission.   - ENT  2/23/24: \"Interval PET imaging with diagnostic CT neck with contrast for further characterization would be necessary and recommended before considering biopsy. Ultimately, direct laryngoscopy with biopsy in the operating room would be necessary to obtain tissue diagnosis from this site. Would not recommend any further imaging or surgical intervention until determination from medical oncology appropriateness for any chemotherapeutic management. Findings in the base of tongue would not be treated surgically at this time. 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    Hx of VTE/PE  History of subsegmental PE on 11/25/23  - Not on ac due to gi bleed. Hx of IVC filter in the past, not clear if it was removed.     Paroxysmal Atrial fibrillation  Not on anticoagulation due to anemia thought to be due to a GI bleed. Has history of angioextasia and diverticulosis. Tachy on admission, driven by infection.   -Tachycardia has improved although intermittently tachy due to agitation and likely dehydration/poor PO, continue to monitor on telemetry     Anemia  Thrombocytopenia  Chronic anemia, stable at baseline 9  - Drop in Platelet count suspect related to sepsis. Patient runs low count  intermittently, monitor     Severe aortic stenosis s/p bioprosthetic aortic valve  HTN  HLD  Elevated BNP  Chronically elevated troponin  Hx of orthostatic hypotension  Troponin initially 107, 90 on recheck. Both troponin and BNP (30k) higher than previous. Suspect due to sepsis.   - Telemetry, repeat TTE pending  - Remains on florinef for hypotension     BPH  - Continue PTA Flomax. 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   - Creatinine back WNL     Depression/Anxiety/Pain  - PTA Duloxetine and gabapentin doses reduced per family request      Deep Tissue Pressure Injury to sacrum and bilateral buttocks,POA  - WOC consulted    Clinically Significant Risk Factors        # Hypokalemia: Lowest K = 3 mmol/L in last 2 days, will replace as needed       # Hypoalbuminemia: Lowest albumin = 3.2 g/dL at 2/19/2024  8:54 PM, will monitor as appropriate     # Hypertension: Noted on problem list    # Chronic heart failure with preserved ejection fraction: heart failure noted on problem list and last echo with EF >50%        # Severe Malnutrition: based on nutrition assessment      # Financial/Environmental Concerns:    # COPD: noted on problem list          Diet: Combination Diet Regular Diet Adult; Soft and Bite Sized Diet (level 6); Slightly Thick (level 1)     DVT Prophylaxis: Pneumatic Compression Devices   Suazo Catheter: Not present  Lines: None     Cardiac Monitoring: ACTIVE order. Indication: Tachyarrhythmias, acute (48 hours)  Code Status: Full Code      Disposition Plan       Expected Discharge Date: 02/27/2024,  3:00 PM      Discharge Comments: Pallative Care Conference 2/22- Full restorative care, goal to return home      Entered: Radha Dean MD 02/25/2024, 12:14 PM     Care Team Updated: yes    Family Updated: Yes    Disposition: IV Abx can likely be discontinued tomorrow pending negative cultures.  Will need PT recommendations and final ID recommendations before discharge can occur. Patient may be ready to discharge in the coming days.    Radha Dean MD  Hospitalist Service   [facility]  Securely message with the Vocera Web Console (learn more here)  Text page via [employer] Paging/Directory         Medical Decision Making       40 MINUTES SPENT BY ME on the date of service doing chart  "review, history, exam, documentation & further activities per the note.           Interval History     Was refusing IV abx last night.     Patient has sitter at bedside.    This morning the patient was initially sleeping and did not want to wake up. Towards the end of my visit, the patient woke up. He was A&O x 4 for me. States that his breathing is \"horses**t\" but would not give me more information. He looks comfortable on 2L NC.    -Data reviewed today: I reviewed all new labs and imaging results over the last 24 hours.     Physical Exam   Temp: 98.5  F (36.9  C) Temp src: Axillary BP: 135/83 Pulse: 103   Resp: 18 SpO2: 96 % O2 Device: Nasal cannula Oxygen Delivery: 3 LPM  Vitals:    02/23/24 0630 02/24/24 0554 02/25/24 0643   Weight: 57.6 kg (126 lb 15.8 oz) 54.4 kg (119 lb 14.9 oz) 57 kg (125 lb 10.6 oz)     Vital Signs with Ranges  Temp:  [97.5  F (36.4  C)-98.5  F (36.9  C)] 98.5  F (36.9  C)  Pulse:  [] 103  Resp:  [18] 18  BP: (111-135)/(66-83) 135/83  SpO2:  [86 %-98 %] 96 %  I/O last 3 completed shifts:  In: 250 [P.O.:250]  Out: -       Constitutional: Awake, alert, cooperative, no apparent distress.    HEENT: PERRL, Normocephalic, without obvious abnormality, atraumatic, oral pharynx with moist mucus membranes  Pulmonary: Clear breath sounds bilaterally (improved)  Cardiovascular: Regular rate and rhythm, normal S1 and S2  GI: Normal bowel sounds, soft, non-distended, non-tender.  Skin/Integumen: Visualized skin appeared clear.  Neuro: CN II-XII grossly intact.  Psych:  Alert and oriented x 4  Extremities: No lower extremity edema noted      Medications      albuterol  2.5 mg Nebulization 4x Daily    ceFEPIme  2 g Intravenous Q12H    DULoxetine  40 mg Oral Daily    famotidine  20 mg Oral Daily    fludrocortisone  0.1 mg Oral Daily    gabapentin  400 mg Oral At Bedtime    metroNIDAZOLE  500 mg Intravenous Q12H    potassium chloride  20 mEq Oral Once    sodium chloride (PF)  3 mL Intracatheter Q8H    " tamsulosin  0.4 mg Oral Daily    umeclidinium  1 puff Inhalation Daily    vancomycin  125 mg Oral Q6H       Data   Recent Labs   Lab 02/25/24  0717 02/24/24  0753 02/23/24  1730 02/23/24  0720 02/22/24  2309 02/22/24  0811 02/21/24  0814 02/20/24  1019 02/20/24  0651 02/19/24  2105 02/19/24  2054   WBC 7.9  --   --  8.1  --  9.7 14.6*  --    < >  --  17.1*   HGB 9.4*  --   --  8.9*  --  9.1* 9.9*  --    < >  --  10.0*   MCV 86  --   --  83  --  83 84  --    < >  --  83     --   --  126*  --  122* 120*  --    < >  --  162   INR  --   --   --   --   --   --   --   --   --  1.37*  --    NA  --   --   --  144  --  143 141  --    < >  --  138   POTASSIUM 3.0* 3.8 3.4 3.2*  3.2*   < > 2.9* 3.6  --    < >  --  3.9   CHLORIDE  --   --   --  108*  --  107 106  --    < >  --  99   CO2  --   --   --  26  --  27 28  --    < >  --  26   BUN  --   --   --  17.5  --  20.9 23.6*  --    < >  --  19.4   CR  --   --   --  0.80  --  0.85 1.01  --    < >  --  1.51*   ANIONGAP  --   --   --  10  --  9 7  --    < >  --  13   AMRITA  --   --   --  8.9  --  8.8 8.6  --    < >  --  8.6   GLC  --   --   --  98  --  97 120*  --    < >  --  178*   ALBUMIN  --   --   --   --   --   --   --   --   --   --  3.2*   PROTTOTAL  --   --   --   --   --   --   --  6.0*  --   --  6.8   BILITOTAL  --   --   --   --   --   --   --   --   --   --  0.9   ALKPHOS  --   --   --   --   --   --   --   --   --   --  93   ALT  --   --   --   --   --   --   --   --   --   --  7   AST  --   --   --   --   --   --   --   --   --   --  17    < > = values in this interval not displayed.       No results found for this or any previous visit (from the past 24 hour(s)).

## 2024-02-25 NOTE — PROGRESS NOTES
02/25/24 8621   Appointment Info   Signing Clinician's Name / Credentials (PT) Amaya Pulido DPT   Living Environment   Living Environment Comments Pt unable to provide subjective history, per chart at baseline pt lives at home with his spouse. [facility]   Self-Care   Usual Activity Tolerance moderate   Current Activity Tolerance fair   Equipment Currently Used at Home walker, rolling   Fall history within last six months yes   Number of times patient has fallen within last six months   (Pt unable to state number of falls.)   General Information   Onset of Illness/Injury or Date of Surgery 02/19/24   Referring Physician Areli Davis MD   Patient/Family Therapy Goals Statement (PT) Pt unabel to state.   Pertinent History of Current Problem (include personal factors and/or comorbidities that impact the POC) 89 y/o male admitted with  worsening cough and increasing O2 needs with CT showing bilateral pneumonia with loculated effusion. PMH including lung cancer s/p wedge resection, recurrent c.diff, afib, COPD, PE, aortic stenosis s/p bioprosthetic heart valve, PATIENCE, CKD.   Existing Precautions/Restrictions fall   General Observations Pt in supine upon arrival of therapist, RN present. Pt on 3 LPM at start of session.   Cognition   Affect/Mental Status (Cognition) confused   Orientation Status (Cognition) person;place   Follows Commands (Cognition) follows one-step commands;physical/tactile prompts required;repetition of directions required;verbal cues/prompting required   Pain Assessment   Patient Currently in Pain No   Posture    Posture Comments Noted forward head and shoulder posture sitting EOB and standing at FWW.   Range of Motion (ROM)   ROM Comment B LEs WFL.   Strength (Manual Muscle Testing)   Strength Comments Not formally assessed, pt demonstrates B LE weakness with mobility, at least 3/5 grossly in B LEs.   Bed Mobility   Comment, (Bed Mobility) Supine-sit with SBA.    Transfers   Comment, (Transfers) Sit <> stand with FWW and Bella.   Gait/Stairs (Locomotion)   Comment, (Gait/Stairs) Pt amb 10' with FWW and CGA.   Balance   Balance Comments Noted good seated balance, fair standing/dynamic balance requiring UE support on FWW.   Sensory Examination   Sensory Perception Comments NT.   Clinical Impression   Criteria for Skilled Therapeutic Intervention Yes, treatment indicated   PT Diagnosis (PT) Difficulty with functional mobility.   Influenced by the following impairments SOB, generalized weakness, Decreased activity tolerance, Impaired balance   Functional limitations due to impairments Limited functional mobility requiring AD and assist.   Clinical Presentation (PT Evaluation Complexity) stable   Clinical Presentation Rationale Based on PMH, current presentation, and social support.   Clinical Decision Making (Complexity) low complexity   Planned Therapy Interventions (PT) balance training;bed mobility training;gait training;ROM (range of motion);strengthening;transfer training   Risk & Benefits of therapy have been explained patient   PT Total Evaluation Time   PT Eval, Low Complexity Minutes (06254) 10   Physical Therapy Goals   PT Frequency 5x/week   PT Predicted Duration/Target Date for Goal Attainment 03/01/24   PT: Bed Mobility Supervision/stand-by assist;Supine to/from sit   PT: Transfers Supervision/stand-by assist;Sit to/from stand;Assistive device   PT: Gait Supervision/stand-by assist;Rolling walker;100 feet   Therapeutic Activity   Therapeutic Activities: dynamic activities to improve functional performance Minutes (39815) 15   Symptoms Noted During/After Treatment Fatigue;Shortness of breath   Treatment Detail/Skilled Intervention PT: Pt in supine upon arrival of therapist, RN bedside. Noted pt on 3 LPM at start of session. Pt confused although able to redirect throughout session. Pt performed supine-sit with SBA. Sit <> stand from EOB with FWW and Bella, cues  provided for appropriate hand placement and upright posture upon standing. Pt amb an additional 20' with FWW and CGA, frequent cues provided to keep FWW close to body and upright posture. Pt reported increased dizziness with amb, RN notified of HR of 126 bpm. Upon returning to recliner chair noted difficulty getting SpO2 on 3 LPM, RN increased O2 to 5 LPM and continuous cues for PLB in order for SpO2 to increase >90%. RN bedside at end of session. Chair alarm on and needs within reach.   PT Discharge Planning   PT Plan Monitor O2 sats, progress transfers and gait   PT Discharge Recommendation (DC Rec) Transitional Care Facility   PT Rationale for DC Rec Pt is below baseline, limited by decreased activity tolerance, weakness and SOB. PT will benefit from continued skilled PT intervention upon discharge. After TCU pt would benefit from higher level of support at LTC.   PT Brief overview of current status Assist of 1 with FWW   Total Session Time   Timed Code Treatment Minutes 15   Total Session Time (sum of timed and untimed services) 25

## 2024-02-25 NOTE — PROVIDER NOTIFICATION
MD Notification    Notified Person: MD    Notified Person Name: CHRISTIAN CARL    Notification Date/Time: 19:30 02/24/24    Notification Interaction: [employer] messaging    Purpose of Notification: Pt is refusing his IV antibiotics after several attempts this writer and the charge nurse. PRN Zyprexa was given for agitation. Pt calmed down but is still refusing his antibiotics. Pt is Alert and oriented x 2.     Orders Received:  Try again in 2-3 hours    Comments:

## 2024-02-26 NOTE — PLAN OF CARE
Orientation/Cognitive: A/Ox1  Mobility Level/Assist Equipment: Ax1 GB and walker  Fall Risk (Y/N): yes  Behavior Concerns: confused but cooperative with cares  Pain Management: no pain reported this shift  Tele/VS/O2: Tele is Afib with occasional PVC, VSS on 1L NC  ABNL Lab/BG: K 3.5  Diet: reg diet, soft and bite sized, slightly thickened liquid  Bowel/Bladder: incontinent at times, voided x1 this shift  Skin Concerns: abrasian on left elbow and sacrum, scattered bruising   Drains/Devices: L PIV SL  Tests/Procedures for next shift: ID and PT following  Anticipated DC date & active delays: tbd

## 2024-02-26 NOTE — PLAN OF CARE
Goal Outcome Evaluation:       A&OX4. VSS on 2L of o2. Up Ao1 with GBW. Had brief episode of hypoxia after working with PT, which resolved following oxymask placement and increasing o2. Was able to wean pt back to NC on 2L of o2 which he is tolerating well currently. Has intermittent cough. Denies pain. Tele A-fib. Discharge pending.

## 2024-02-26 NOTE — PLAN OF CARE
Goal Outcome Evaluation:    A&O to self. VSS on 3 L O2 oxymask @ bedtime. Slightly tachy to low 100s. Tele A-fib w/ RVR. Ax1 w/ GB & walker. Intermittently incontinent of urine. Mepilex on coccyx, back & L arm. Denies pain. PIV SL. Will continue to monitor.

## 2024-02-26 NOTE — PROGRESS NOTES
CLINICAL NUTRITION SERVICES - REASSESSMENT NOTE      Recommendations Ordered by Registered Dietitian (RD):   Ordered standard trays  Ordered gel+ w/ lunch     Malnutrition:   % Weight Loss:  10% in 6 months (moderate malnutrition)-- 10% loss in 5 months (2/22)  % Intake:  </= 50% for >/= 5 days (severe malnutrition)  Subcutaneous Fat Loss:  Orbital region moderate depletion and Upper arm region moderate depletion (2/22)  Muscle Loss:  all severe (2/22)  Fluid Retention:  None noted    Malnutrition Diagnosis: Severe malnutrition in the context of --  Acute illness or injury  Chronic illness or disease         EVALUATION OF PROGRESS TOWARD GOALS   Diet: Soft and Bite Sized Diet (level 6); Slightly Thick (level 1)      2/23: VFSS = Acute on chronic dysphagia --> Continue soft/bite sized solids, upgrade to slightly thick liquids (IDDSI 6, 1).     Intake/Tolerance:  Pt was sleeping in his chair today. Did not disturb  D/w pt's bedside RN-- pt refusing meals. Nursing ordering meals meals. Offered to start standard meals, RN in agreement it would be good idea   Pt refusing meals, needs lots of encouragement per chart review.   Pt has been receiving 3 meals/day per [facility]. 0-25% intakes per nursing flow sheet.      ASSESSED NUTRITION NEEDS:  Dosing Weight: 62.6 kg (2/13)  Estimated Energy Needs: 7110-5639 kcal (30-35 Kcal/Kg)  Justification: IBW <100%  Estimated Protein Needs: 75-94 grams protein (1.2-1.5 g pro/Kg)  Justification: hypercatabolism with acute illness, wound healing, IBW <100%  Estimated Fluid Needs: 1 mL/kcal  Justification: maintenance         NEW FINDINGS:   General: A/O to self  2/22: GOC d/w palliative = Restorative without limits     Weight: wt down, suspect some shifts d/t bed scale wt method. Pt refusing PO intake, suspect some wt loss true   Date/Time Weight Weight Method   02/25/24 0643 57 kg (125 lb 10.6 oz) Bed scale   02/24/24 0554 54.4 kg (119 lb 14.9 oz) Bed scale   02/23/24 0630 57.6 kg  (126 lb 15.8 oz) Bed scale   02/22/24 1406 61 kg (134 lb 7.7 oz) Bed scale     I/O: Net IO Since Admission: 1,770 mL [02/26/24 1121].  Stool output = 1x BM yesterday, 3x on 2/24, 1x on 2/23    Labs: reviewed     Medications: reviewed    DULoxetine  40 mg Oral Daily    famotidine  20 mg Oral Daily     Resp.: 1 LPM nasal cannula vs oxy mask         Previous Goals:   Nutrition POC be determined w/in 72 hours   Evaluation: Met-- restorative cares    Previous Nutrition Diagnosis:   Malnutrition related to suspected poor appetite 2/2 aging as evidenced by 10% wt loss in 5 months, moderate-severe fat and muscle loss   Evaluation: No change        MALNUTRITION  % Weight Loss:  10% in 6 months (moderate malnutrition)-- 10% loss in 5 months (2/22)  % Intake:  </= 50% for >/= 5 days (severe malnutrition)  Subcutaneous Fat Loss:  Orbital region moderate depletion and Upper arm region moderate depletion (2/22)  Muscle Loss:  all severe (2/22)  Fluid Retention:  None noted    Malnutrition Diagnosis: Severe malnutrition in the context of --  Acute illness or injury  Chronic illness or disease        CURRENT NUTRITION DIAGNOSIS  Malnutrition related to poor appetite 2/2 aging and mentation as evidenced by 10% wt loss in 5 months, moderate-severe fat and muscle loss, refusal of many meals this admission x7 days         INTERVENTIONS  Recommendations / Nutrition Prescription  Ordered standard trays  Ordered gel+ w/ lunch    Implementation  Collaboration with other nutrition professionals  Collaboration with other providers  Medical food supplement therapy  Modify composition of meals/snacks    Goals  Pt to consume at least 1 good meal/day         MONITORING AND EVALUATION:  Progress towards goals will be monitored and evaluated per protocol and Practice Guidelines      Marly Lance RD, LD  Pager: [phone]

## 2024-02-26 NOTE — PROGRESS NOTES
A&Ox1.Frustrated and was refusing some of the cares.On Enteric Isolation.VSS On 1L.Denies pain.Pt refused his Vanco dose during this shift.Scattered bruising. Abrasian on L elbow and sacrum.Mepilex on coccyx, back &L arm.IV SL.A 1x/w/gb/w.Continue monitoring.

## 2024-02-26 NOTE — PROGRESS NOTES
[facility]    Medicine Progress Note - Hospitalist Service    Date of Admission:  2/19/2024    Assessment & Plan   Hermilo Velasquez is a 89 year old male with a history of lung cancer s/p wedge resection, recurrent c.diff, afib, chronic obstructive pulmonary disease, pulmonary embolism, aortic stenosis s/p bioprosthetic heart valve, chronic kidney disease, admitted on 2/19/2024 with worsening cough and increasing O2 needs with CT showing bilateral pneumonia with loculated effusion.      Sepsis due to bilateral pneumonia  Loculated effusion of RLL, likely parapneumonic S/p thoracentesis 2/20/24  CT showed increased consolidation of the RLL and new patchy infiltrates in the LLL concerning for progressive bilateral pneumonia. Also noted is loculated effusion on the right, progressed from prior. With increased O2 needs, new leukocytosis, procal of 21 and fever felt likely infectious source. Noted history of dysphagia so aspiration pneumonia a possibility though patient notes stability in his swallowing/eating. Patient was initially started on Cefepime and vancomycin. ID was consulted and patient was transitioned to IV Cefepime and Flagyl. Case was previously reviewed with thoracic surgery who felt that there was no need for chest tube and patient is s/p thoracentesis.                  - Blood Cx: No growth                - Pleural Fluid clotted off and cell count/differential could not be completed               - Gram stain without organisms and culture negative so far,                  - Spo2 goal > 88%                             - Wean oxygen as able                   - Initial IV Cefepime and Flagyl                            - Antibiotics discontinued per ID recommendations after completing treatment course                  - ID consulted and following in hospital, signed off 2/25                            - 7 day course of antibiotics for pneumonia recommended  - Pleural fluid  cultures, blood cultures negative     - speech and language therapy (SLP) consulted   - S/p VFSS 2/23/24   - Showed moderate oral dysphagia, mild pharyngeal dysphagia   - Continue modified diet, SLP follow-up      Acute encephalopathy due to sepsis  Suspect dementia with occasional agitation  By report, unclear how far off baseline mental status, noted to have similar issues during his last hospital stay with negative head CT and brain MRI. Head CT in the ED on 2/19 negative. Also noted to have significant cognitive deficits and memory issues per previous hospital stay.   -Treating sepsis as above.  - PRN olanzapine (Zyprexa) in hospital  - Previous hospitalist discussed case with family and they requested decreasing dose of gabapentin and Cymbalta      Dysphagia  - Continue dysphagia diet  - Speech consult as above.  - Encourage PO  - Difficult to maintain IV, will avoid IV infusion. If needed small IV boluses.      Hypokalemia  - Replace per protocol  - Check mag and replace if needed  Recent Labs   Lab 02/26/24  1014 02/25/24  1842 02/25/24  0717 02/24/24  0753 02/23/24  1730 02/23/24  0720   POTASSIUM 4.3 3.4 3.0* 3.8 3.4 3.2*  3.2*      Chronic obstructive pulmonary disease (COPD)  Chronic hypoxic respiratory failure on 3L O2  No significant wheezing recently, low suspicion of acute COPD exacerbation at this time  - Continue PTA inhalers  - Wean O2 as above, encourage spirometry  - Treat for pneumonia as above     History of recurrent Clostridium difficile (C diff) colitis  Diarrhea reported on January 14 prior to last admission admission. Previously recommended that the patient follow up at [facility] for fecal transplantation, but he has not. ID note from 11/28/23 had recommended a prolonged taper of vancomycin. Previously he had been recommended to continue po vancomycin indefinitely until fecal transplant. *Of note, oral vancomycin was discontinued during his last admission in January due to lack of  "diarrhea. Vanco restarted during last admission.   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 Planned for pet scan and biopsy of nodule on 1/9 was cancelled by the patient  - Outpatient follow-up with oncology.  - Cytology on pleural fluid 2/20, negative for malignancy; acute inflammation noted     FDG avid soft tissue nodule in the right tongue base  Concern for squamous cell carcinoma; also concern if causing aspiration and PNA.  - Appointment with ENT on 1/11 was cancelled to work up the lung first.  - Wife planned to make appointment with ENT after most recent admission.   - ENT  2/23/24: \"Interval PET imaging with diagnostic CT neck with contrast for further characterization would be necessary and recommended before considering biopsy. Ultimately, direct laryngoscopy with biopsy in the operating room would be necessary to obtain tissue diagnosis from this site. Would not recommend any further imaging or surgical intervention until determination from medical oncology appropriateness for any chemotherapeutic management. Findings in the base of tongue would not be treated surgically at this time. 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     Hx of VTE/PE  History of subsegmental pulmonary embolism (PE) on 11/25/23  - Not on anticoagulation due to gi bleed. Hx of IVC filter in the past, not clear if it was removed.  - follow-up with primary clinic provider      Paroxysmal atrial fibrillation  Not on anticoagulation due to anemia thought to be due to a GI bleed. Has history of angioextasia and diverticulosis. " Tachycardia on admission, driven by infection.   -Tachycardia has improved although intermittently tachy due to agitation and likely dehydration/poor PO, recently monitored on telemetry     Anemia, normocytic  Thrombocytopenia  Chronic anemia, stable at baseline 9  - Drop in Platelet count suspect related to sepsis. Patient runs low count  intermittently, monitor  Recent Labs   Lab 02/25/24  0717 02/23/24  0720 02/22/24  0811 02/21/24  0814 02/20/24  0651 02/19/24 2054   HGB 9.4* 8.9* 9.1* 9.9* 9.3* 10.0*     Recent Labs   Lab 02/25/24  0717 02/23/24  0720 02/22/24  0811 02/21/24  0814 02/20/24  0651 02/19/24 2054    126* 122* 120* 132* 162     Severe aortic stenosis s/p bioprosthetic aortic valve  Hypertension  Hyperlipidemia  Elevated BNP  Chronically elevated troponin  Hx of orthostatic hypotension  Troponin initially 107, 90 on recheck. Both troponin and BNP (30k) higher than previous. Suspect due to sepsis.   - Telemetry in hospital  - transthoracic echocardiogram (TTE) 2/21/24, limited study, see report  - Continue prior to admission fludrocortisone for hypotension  - Monitor, follow-up with primary clinic provider and cardiology     Benign prostatic hypertrophy (BPH)  - Continue PTA tamsulosin (Flomax)  - Monitor for urinary retention, bladder scan PRN     History of GI bleed  Gastroesophageal reflux disease (GERD)  Anemia  - Continue PTA famotidine, allergic to PPI  - Monitor hemoglobin as above     Acute kidney injury (FRED)  Chronic kidney disease (CKD) stage 2  Baseline creatinine around 0.8 but elevated to 1.51 on admission, due to sepsis.   - Creatinine back WNL  Recent Labs   Lab 02/23/24  0720 02/22/24  0811 02/21/24  0814 02/20/24  0651 02/19/24 2054   CR 0.80 0.85 1.01 1.36* 1.51*      Depression/Anxiety/Pain  - PTA Duloxetine and gabapentin doses reduced per family request; monitor     Deep Tissue Pressure Injury to sacrum and bilateral buttocks  - WOC consulted, ongoing wound care and  "follow-up     Disposition  -Estimated length of stay 24 to 48 hours  -Anticipated discharge to transitional care unit vs home  -Social work consulted          Diet: Combination Diet Regular Diet Adult; Soft and Bite Sized Diet (level 6); Slightly Thick (level 1)  Snacks/Supplements Adult: Gelatein Plus; With Meals  Room Service    DVT Prophylaxis: Pneumatic Compression Devices  Suazo Catheter: Not present  Lines: None     Cardiac Monitoring: ACTIVE order. Indication: Tachyarrhythmias, acute (48 hours)  Code Status: Full Code      Clinically Significant Risk Factors        # Hypokalemia: Lowest K = 3 mmol/L in last 2 days, will replace as needed       # Hypoalbuminemia: Lowest albumin = 3.2 g/dL at 2/19/2024  8:54 PM, will monitor as appropriate     # Hypertension: Noted on problem list  # Chronic heart failure with preserved ejection fraction: heart failure noted on problem list and last echo with EF >50%        # Severe Malnutrition: based on nutrition assessment    # Financial/Environmental Concerns:    # COPD: noted on problem list        Disposition Plan      Expected Discharge Date: 02/26/2024,  3:00 PM      Discharge Comments: Pallative Care Conference 2/22- Full restorative care, goal to return home            Bucky Johnston MD  Hospitalist Service  [facility]  Securely message with [employer] (more info)  Text page via [employer] Paging/Directory   ______________________________________________________________________    Interval History   First visit with patient today.  Sitting in chair pleasant and interactive.  Able to state he is in the \"hospital\" and the year is \"-24\".  No report of pain.  Care plan reviewed with nursing staff at the bedside.    Physical Exam   Vital Signs: Temp: 97.5  F (36.4  C) Temp src: Axillary BP: 129/82 Pulse: 101   Resp: 20 SpO2: 94 % O2 Device: Nasal cannula Oxygen Delivery: 1 LPM  Weight: 125 lbs 10.6 oz    GENERAL awake and alert, sitting in chair in no " acute distress   LUNGS no wheezes or crackles  HEART S1, S2 with RRR  ABDOMEN soft, nontender  MUSCULOSKELETAL extremities without significant edema  SKIN warm and dry  NEURO moves upper and lower extremities spontaneously and to command  MENTAL STATUS answering questions and following simple commands    Medical Decision Making             Data     I have personally reviewed the following data over the past 24 hrs:    N/A  \   N/A   / N/A     N/A N/A N/A /  N/A   4.3 N/A N/A \       Imaging results reviewed over the past 24 hrs:   No results found for this or any previous visit (from the past 24 hour(s)).

## 2024-02-26 NOTE — PLAN OF CARE
Goal Outcome Evaluation:      Plan of Care Reviewed With: other (see comments)    Overall Patient Progress: no changeOverall Patient Progress: no change    Outcome Evaluation: Soft and bite sized diet w/ slightly thick liquids. pt refusing PO often. will start standard trays w/ daily supplement    Marly Lance RD, LD

## 2024-02-27 PROBLEM — A41.9 SEPSIS DUE TO PNEUMONIA (H): Status: ACTIVE | Noted: 2024-01-01

## 2024-02-27 PROBLEM — J18.9 SEPSIS DUE TO PNEUMONIA (H): Status: ACTIVE | Noted: 2024-01-01

## 2024-02-27 NOTE — PROGRESS NOTES
A/O x1,disoriented to place, time, and situation. On 1L NC. Tele discontinued in this shift. Up with 1/wlk/gb to BR. Refused to get in the chair for meals. Takes pills in whole with water with lots of encouragement. Had 25% breakfast and 50% lunch. No BM in this shift. Denies pain. Discharge pending TCU placement. Plan of care ongoing.

## 2024-02-27 NOTE — PROGRESS NOTES
[facility]    Medicine Progress Note - Hospitalist Service    Date of Admission:  2/19/2024    Assessment & Plan   Hermilo Velasquez is a 89 year old male with a history of lung cancer s/p wedge resection, recurrent c.diff, afib, chronic obstructive pulmonary disease, pulmonary embolism, aortic stenosis s/p bioprosthetic heart valve, chronic kidney disease, admitted on 2/19/2024 with worsening cough and increasing O2 needs with CT showing bilateral pneumonia with loculated effusion.      Sepsis due to bilateral pneumonia  Loculated effusion of RLL, likely parapneumonic S/p thoracentesis 2/20/24  CT showed increased consolidation of the RLL and new patchy infiltrates in the LLL concerning for progressive bilateral pneumonia. Also noted is loculated effusion on the right, progressed from prior. With increased O2 needs, new leukocytosis, procal of 21 and fever felt likely infectious source. Noted history of dysphagia so aspiration pneumonia a possibility though patient notes stability in his swallowing/eating. Patient was initially started on Cefepime and vancomycin. ID was consulted and patient was transitioned to IV Cefepime and Flagyl. Case was previously reviewed with thoracic surgery who felt that there was no need for chest tube and patient is s/p thoracentesis.                  - Blood Cx: No growth                - Pleural Fluid clotted off and cell count/differential could not be completed               - Gram stain without organisms and culture negative so far,                  - Spo2 goal > 88%                             - Wean oxygen as able ; on chronic oxygen prior to admission 3L NC                  - Initial IV Cefepime and Flagyl                            - Antibiotics discontinued per ID recommendations after completing treatment course for pneumonia                  - ID consulted and following in hospital, signed off 2/25                            - 7 day course of  antibiotics for pneumonia recommended  - Pleural fluid cultures, blood cultures negative     - speech and language therapy (SLP) consulted   - S/p VFSS 2/23/24   - Showed moderate oral dysphagia, mild pharyngeal dysphagia   - Continue modified diet, SLP follow-up      Acute encephalopathy due to sepsis  Suspect dementia with occasional agitation  By report, unclear how far off baseline mental status, noted to have similar issues during his last hospital stay with negative head CT and brain MRI. Head CT in the ED on 2/19 negative. Also noted to have significant cognitive deficits and memory issues per previous hospital stay.   -Treatment of sepsis as above.  - PRN olanzapine (Zyprexa) in hospital  - Previous hospitalist discussed case with family and they requested decreasing dose of gabapentin and Cymbalta      Dysphagia  - Continue dysphagia diet  - Speech consult as above.  - Encourage PO  - Difficult to maintain IV, will avoid IV infusion. If needed small IV boluses.      Hypokalemia  - Replace per protocol  - Magnesium level recently normal  Recent Labs   Lab 02/26/24  1014 02/25/24  1842 02/25/24  0717 02/24/24  0753 02/23/24  1730 02/23/24  0720   POTASSIUM 4.3 3.4 3.0* 3.8 3.4 3.2*  3.2*      Chronic obstructive pulmonary disease (COPD)  Chronic hypoxic respiratory failure on 3L O2  No significant wheezing recently, low suspicion of acute COPD exacerbation at this time  - Continue PTA inhalers  - Wean O2 as above, encourage spirometry  - Treatment for pneumonia as above     History of recurrent Clostridium difficile (C diff) colitis  Diarrhea reported on January 14 prior to last admission admission. Previously recommended that the patient follow up at [facility] for fecal transplantation, but he has not. ID note from 11/28/23 had recommended a prolonged taper of vancomycin. Previously he had been recommended to continue po vancomycin indefinitely until fecal transplant. *Of note, oral vancomycin was  "discontinued during his last admission in January due to lack of diarrhea. Vanco restarted during last admission.   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 Planned for pet scan and biopsy of nodule on 1/9 was cancelled by the patient  - Outpatient follow-up with oncology.  - Cytology on pleural fluid 2/20, negative for malignancy; acute inflammation noted     FDG avid soft tissue nodule in the right tongue base  Concern for squamous cell carcinoma; also concern if causing aspiration and PNA.  - Appointment with ENT on 1/11 was cancelled to work up the lung first.  - Wife planned to make appointment with ENT after most recent admission.   - ENT  2/23/24: \"Interval PET imaging with diagnostic CT neck with contrast for further characterization would be necessary and recommended before considering biopsy. Ultimately, direct laryngoscopy with biopsy in the operating room would be necessary to obtain tissue diagnosis from this site. Would not recommend any further imaging or surgical intervention until determination from medical oncology appropriateness for any chemotherapeutic management. Findings in the base of tongue would not be treated surgically at this time. 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     Hx of VTE/PE  History of subsegmental pulmonary embolism (PE) on 11/25/23  - Not on anticoagulation due to gi bleed. Hx of IVC filter in the past, not clear if it was removed.  - follow-up with primary clinic provider      Paroxysmal atrial fibrillation  Not on anticoagulation due to anemia thought to be due " to a GI bleed. Has history of angioextasia and diverticulosis. Tachycardia on admission, driven by infection.   -Tachycardia has improved although intermittently tachy due to agitation and likely dehydration/poor PO, recently monitored on telemetry     Anemia, normocytic  Thrombocytopenia  Chronic anemia, stable at baseline 9  - Drop in Platelet count suspect related to sepsis. Patient runs low count  intermittently, monitor  Recent Labs   Lab 02/25/24  0717 02/23/24  0720 02/22/24  0811 02/21/24  0814   HGB 9.4* 8.9* 9.1* 9.9*     Recent Labs   Lab 02/25/24  0717 02/23/24  0720 02/22/24  0811 02/21/24  0814    126* 122* 120*     Severe aortic stenosis s/p bioprosthetic aortic valve  Hypertension  Hyperlipidemia  Elevated BNP  Chronically elevated troponin  Hx of orthostatic hypotension  Troponin initially 107, 90 on recheck. Both troponin and BNP (30k) higher than previous. Suspect due to sepsis.   - Telemetry in hospital  - transthoracic echocardiogram (TTE) 2/21/24, limited study, see report  - Continue prior to admission fludrocortisone for hypotension  - Monitor, follow-up with primary clinic provider and cardiology     Benign prostatic hypertrophy (BPH)  - Continue PTA tamsulosin (Flomax)  - Monitor for urinary retention, bladder scan PRN     History of GI bleed  Gastroesophageal reflux disease (GERD)  Anemia  - Continue PTA famotidine, allergic to PPI  - Monitor hemoglobin as above     Acute kidney injury (FRED)  Chronic kidney disease (CKD) stage 2  Baseline creatinine around 0.8 but elevated to 1.51 on admission, due to sepsis.   - Creatinine back WNL  Recent Labs   Lab 02/23/24  0720 02/22/24  0811 02/21/24  0814   CR 0.80 0.85 1.01      Depression/Anxiety/Pain  - PTA Duloxetine and gabapentin doses reduced per family request; monitor     Deep Tissue Pressure Injury to sacrum and bilateral buttocks  - WOC consulted, ongoing wound care and follow-up     Weakness and deconditioning    -physical therapy  "(PT), occupational therapy (OT) consulted   -anticipate TCU on discharge    Disposition  -Estimated length of stay 24 hours  -Anticipated discharge to transitional care unit vs home  -Social work consulted  -Phone calls to patient's wife (Roberta) and son (Mikal) for update this afternoon and no answer          Diet: Combination Diet Regular Diet Adult; Soft and Bite Sized Diet (level 6); Slightly Thick (level 1)  Snacks/Supplements Adult: Gelatein Plus; With Meals  Room Service    DVT Prophylaxis: Pneumatic Compression Devices  Suazo Catheter: Not present  Lines: None     Cardiac Monitoring: None  Code Status: Full Code      Clinically Significant Risk Factors              # Hypoalbuminemia: Lowest albumin = 3.2 g/dL at 2/19/2024  8:54 PM, will monitor as appropriate     # Hypertension: Noted on problem list    # Chronic heart failure with preserved ejection fraction: heart failure noted on problem list and last echo with EF >50%       # Cachexia: Estimated body mass index is 17.65 kg/m  as calculated from the following:    Height as of this encounter: 1.778 m (5' 10\").    Weight as of this encounter: 55.8 kg (123 lb).   # Severe Malnutrition: based on nutrition assessment    # Financial/Environmental Concerns:    # COPD: noted on problem list        Disposition Plan      Expected Discharge Date: 02/28/2024,  3:00 PM      Discharge Comments: Home with assist vs. TCU  PT recommends TCU  Pallative Care Conference 2/22- Full restorative care, goal to return home            Bucky Johnston MD  Hospitalist Service  [facility]  Securely message with [employer] (more info)  Text page via [employer] Paging/Directory   ______________________________________________________________________    Interval History   First visit with patient yesterday.  Upset stomach reported, no nausea or vomiting.  No abdominal pain.  Remains weak and deconditioned.  Completed course of antibiotics for pneumonia.    Physical " Exam   Vital Signs: Temp: 97.2  F (36.2  C) Temp src: Oral BP: 110/72 Pulse: (!) 121   Resp: 18 SpO2: 93 % O2 Device: Nasal cannula Oxygen Delivery: 2 LPM  Weight: 123 lbs 0 oz    GENERAL awake and alert, lying in bed, no acute distress   LUNGS no wheezes or crackles  HEART S1, S2 with RRR  ABDOMEN soft, nontender  MUSCULOSKELETAL extremities without significant edema  SKIN warm and dry  NEURO moves upper and lower extremities spontaneously and to command  MENTAL STATUS answering questions and following simple commands    Medical Decision Making             Data         Imaging results reviewed over the past 24 hrs:   No results found for this or any previous visit (from the past 24 hour(s)).

## 2024-02-27 NOTE — PROGRESS NOTES
PALLIATIVE CARE PROGRESS NOTE  [facility]     Patient Name: Hermilo Velasquez  Date of Admission: 2/19/2024   Today the patient was seen for: Goals of care, family care conference     Recommendations & Counseling       GOALS OF CARE:   Restorative without limits .  Roberta is hoping that Hermilo will be able to come home after TCU/rehab.  However, PT recommends higher level of care at LTC after discharge from TCU.  I also followed up after the family care conference last week regarding GOC and CPR.  No changes to CODE STATUS or goals of care this time per Roberta.  Family still considering information and patient remains full code.  Attempted to meet with patient today in his room. He became angry and requested I leave the room.    ADVANCE CARE PLANNING:  No health care directive on file. Per  informed consent policy, next of kin should be involved if patient becomes unable.  There is a POLST form on file, this was reviewed and current.  Code status: Full Code     MEDICAL MANAGEMENT:   #Pain,chronic - tingling toe/foot pain- likely related to neuropathy.   Acetaminophen (Tylenol), PRN has only had one dose on 2/19 and 2/24. Gabapentin at bedtime. No complaints noted.     #Delirium- multifactorial related to sepsis, medications, electrolyte disturbances, hospitalization. Improving. Now off sitter precautions.  Avoid benzodiazepines, antihistamines, anticholinergics if able.  Lights on and blinds open during the day.  Reorient frequently.  Lights & TV off during the night.  Promote normal circadian rhythm.  Limit sensory deprivation - utilize hearing aids, glasses, etc.  Frequently assess basic needs such as temperature, elimination, thirst/hunger, pain      #General Weakness/Debility   PT/OT as tolerated.  PT recommendations are for TCU at discharge; also recommendations for higher level of care at LTC setting after TCU.  Appreciate Speech Language recommendations.      PSYCHOSOCIAL/SPIRITUAL  SUPPORT:  Family wife Roberta, son Mikal and a daughter  [facility]: [facility]     Palliative Care will continue to follow. Thank you for the consult and allowing us to aid in the care of Hermilo Velasquez.    Patient case was discussed with Dr Johnston and charge RN    Razia Osman, CNS  Securely message with [employer] (more info)  Text page via [employer] Paging/Directory     Chart documentation was completed, in part, with [employer] voice-recognition software. Even though reviewed, some grammatical, spelling, and word errors may remain.         Interval History:   Hermilo has finished his 7-day course of IV antibiotics. Now off sitter precautions.    During our meeting last week I had reviewed code status with patient and family. I had recommended DNR/DNI status as Hermilo would likely have poor outcome and quality of life if he underwent and survived CPR or intubation. Family wanted time to discuss amongst themselves.  I attempted to discuss with Hermilo in  his room today but he became angry and requested that I leave. I did reconnect with Roberta via phone today and there has been no decision on code status reached as of yet.    Roberta is pleased that Hermilo was able to keep his IV in place and finish his IV antibiotics. She is hoping that Hermilo will be able to come home eventually with services in place after TCU discharge, although PT recommends LTC after discharge from TCU.. She has no further questions or concerns for palliative team regarding goals of care at this time.     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 Had just been discharged from TCU and went home for a few days but quickly decompensated.           Review of Systems:     Besides above, ROS was reviewed and is unremarkable          Physical Exam:   Temp:  [97.2  F (36.2  C)-97.6  F (36.4  C)] 97.2  F (36.2  C)  Pulse:  [] 121  Resp:  [18] 18  BP: (110-134)/(67-83) 110/72  SpO2:  [93 %-97 %] 93 %  123 lbs 0 oz    Physical Exam  GENERAL:  Alert, no distress, resting in bed quietly prior to visit.  HEAD: Normocephalic atraumatic  SCLERA: no drainage or redness  EXTREMITIES: Warm; no edema  ABDOMEN:  Flat  RESPIRATORY: Breathing non labored.  NEUROLOGIC: Alert. Jena,   PSYCH: Angry, dismissive uncooperative.           Current Problem List:   Principal Problem:    Sepsis due to pneumonia (H)      Allergies   Allergen Reactions    Omeprazole Itching    Pantoprazole Itching    Prevacid [Lansoprazole] Itching    Lasix [Furosemide] Rash    Lidocaine Blisters and Rash     Allergy to lidocaine ointment  Allergy to lidocaine ointment      Penicillin G Rash    Penicillins Rash     \"broke out from injection\" 60 yrs ago  Tolerates cephalosporins            Data Reviewed:     No results found for this or any previous visit (from the past 24 hour(s)).    Lab Results   Component Value Date    WBC 7.9 02/25/2024    WBC 8.1 02/23/2024    WBC 9.7 02/22/2024    HGB 9.4 (L) 02/25/2024    HGB 8.9 (L) 02/23/2024    HGB 9.1 (L) 02/22/2024    HCT 31.3 (L) 02/25/2024    HCT 28.6 (L) 02/23/2024    HCT 29.1 (L) 02/22/2024     02/25/2024     (L) 02/23/2024     (L) 02/22/2024     02/23/2024     02/22/2024     02/21/2024    POTASSIUM 4.3 02/26/2024    POTASSIUM 3.4 02/25/2024    POTASSIUM 3.0 (L) 02/25/2024    CHLORIDE 108 (H) 02/23/2024    CHLORIDE 107 " 02/22/2024    CHLORIDE 106 02/21/2024    CO2 26 02/23/2024    CO2 27 02/22/2024    CO2 28 02/21/2024    BUN 17.5 02/23/2024    BUN 20.9 02/22/2024    BUN 23.6 (H) 02/21/2024    CR 0.80 02/23/2024    CR 0.85 02/22/2024    CR 1.01 02/21/2024    GLC 98 02/23/2024    GLC 97 02/22/2024     (H) 02/21/2024    SED 43 (H) 10/06/2022    SED 89 (H) 05/16/2019    SED 83 (H) 05/15/2019    DD 4.6 (H) 03/30/2020    NTBNPI 30,280 (H) 02/19/2024    NTBNPI 24,255 (H) 01/15/2024    NTBNPI 6,480 (H) 11/25/2023    NTBNP 1,233 (H) 08/28/2020    NTBNP 1,593 (H) 08/11/2020    NTBNP 1,363 (H) 10/17/2014    TROPI 0.513 (HH) 04/14/2021    TROPI 0.529 (HH) 04/14/2021    TROPI 0.331 (HH) 04/13/2021    AST 17 02/19/2024    AST 60 (H) 01/15/2024    AST 7 01/08/2024    ALT 7 02/19/2024    ALT 15 01/15/2024    ALT <5 01/08/2024    ALKPHOS 93 02/19/2024    ALKPHOS 102 01/15/2024    ALKPHOS 90 01/08/2024    BILITOTAL 0.9 02/19/2024    BILITOTAL 0.9 01/15/2024    BILITOTAL 0.3 01/08/2024    DAMIAN 16 01/16/2024    DAMIAN 17 11/06/2022    INR 1.37 (H) 02/19/2024    INR 1.53 (H) 01/08/2024    INR 1.15 09/12/2023       Please see A&P for additional details of medical decision making.  MANAGEMENT DISCUSSED with the following over the past 24 hours: Dr Foster, Charge RN.   NOTE(S)/MEDICAL RECORDS REVIEWED over the past 24 hours: Medicine, Nutrition, ID, PT, Nursing.  Tests REVIEWED in the past 24 hours:  - See lab/imaging results included in the data section of the note  SUPPLEMENTAL HISTORY, in addition to the patient's history, over the past 24 hours obtained from:   - Spouse or significant other

## 2024-02-28 NOTE — PLAN OF CARE
Goal Outcome Evaluation:    Patient alert to self only. Cooperative this afternoon. Up with assist of 1 and walker. Denies pain.

## 2024-02-28 NOTE — CONSULTS
Care Management Initial Consult    General Information  Assessment completed with: Spouse or significant other, wife, Roberta  Type of CM/SW Visit: Offer D/C Planning    Primary Care Provider verified and updated as needed: Yes   Readmission within the last 30 days: current reason for admission unrelated to previous admission   Return Category: New Diagnosis  Reason for Consult: discharge planning  Advance Care Planning:            Communication Assessment  Patient's communication style: spoken language (English or Bilingual)             Cognitive  Cognitive/Neuro/Behavioral: .WDL except  Level of Consciousness: confused, alert  Arousal Level: arouses to voice, opens eyes spontaneously  Orientation: disoriented to, place, time, situation  Mood/Behavior: cooperative  Best Language: 0 - No aphasia  Speech: garbled    Living Environment:   People in home: spouse  Roberta  Current living Arrangements: house      Able to return to prior arrangements: yes       Family/Social Support:  Care provided by: spouse/significant other  Provides care for: no one, unable/limited ability to care for self  Marital Status:   Children, Wife          Description of Support System: Involved, Supportive         Current Resources:   Patient receiving home care services: Yes  Skilled Home Care Services: Skilled Nursing, Home Health Aid, Physical Therapy, Occupational Therapy  Community Resources: Home Care  Equipment currently used at home: grab bar, tub/shower, walker, rolling  Supplies currently used at home: Oxygen Tubing/Supplies    Employment/Financial:  Employment Status: , previous service        Financial Concerns: none           Does the patient's insurance plan have a 3 day qualifying hospital stay waiver?  No    Lifestyle & Psychosocial Needs:  Social Determinants of Health     Food Insecurity: No Food Insecurity (5/12/2020)    Hunger Vital Sign     Worried About Running Out of Food in the Last Year: Never true     Ran  Out of Food in the Last Year: Never true   Depression: Not at risk (2/15/2024)    PHQ-2     PHQ-2 Score: 0   Housing Stability: Not on file   Tobacco Use: Medium Risk (2/13/2024)    Patient History     Smoking Tobacco Use: Former     Smokeless Tobacco Use: Never     Passive Exposure: Not on file   Financial Resource Strain: Low Risk  (5/12/2020)    Overall Financial Resource Strain (CARDIA)     Difficulty of Paying Living Expenses: Not hard at all   Alcohol Use: Not on file   Transportation Needs: No Transportation Needs (5/12/2020)    PRAPARE - Transportation     Lack of Transportation (Medical): No     Lack of Transportation (Non-Medical): No   Physical Activity: Inactive (5/12/2020)    Exercise Vital Sign     Days of Exercise per Week: 0 days     Minutes of Exercise per Session: 0 min   Interpersonal Safety: Not At Risk (5/12/2020)    Humiliation, Afraid, Rape, and Kick questionnaire     Fear of Current or Ex-Partner: No     Emotionally Abused: No     Physically Abused: No     Sexually Abused: No   Stress: No Stress Concern Present (5/12/2020)    [employer] [employer] - Occupational Stress Questionnaire     Feeling of Stress : Not at all   Social Connections: Moderately Isolated (5/12/2020)    Social Connection and Isolation Panel [NHANES]     Frequency of Communication with Friends and Family: Twice a week     Frequency of Social Gatherings with Friends and Family: Twice a week     Attends Adventism Services: Never     Active Member of Clubs or Organizations: No     Attends Club or Organization Meetings: Never     Marital Status:        Functional Status:  Prior to admission patient needed assistance:   Dependent ADLs:: Ambulation-cane, Ambulation-walker  Dependent IADLs:: Cleaning, Cooking, Laundry, Shopping, Meal Preparation, Medication Management, Transportation  Assesssment of Functional Status: Not at baseline with mobility    Mental Health Status:  Mental Health Status: Current  "Concern (A&O x 1)       Chemical Dependency Status:                Values/Beliefs:  Spiritual, Cultural Beliefs, Holiness Practices, Values that affect care: yes  Description of Beliefs that Will Affect Care:  ([place])            Additional Information:  Writer was consulted for discharge planning several days ago but a Palliative consult was ordered and pt was being followed by Pallative. Care team and SW were following for TCU placement recommended by therapy. Pt and family refusing TCU and requesting full restorative care and to be discharged to home with resumption of home care services.     Patient is open to [facility] home services already so writer connected with liaison Maria L from [facility] and she states they will be able to resume home care services at discharge of SN,PT,OT,HHA,SW. Writer called wife Roberta at home and introduced self and role in discharge planning. Writer told wife that patient is going to be discharged today with resumption of home care services.  Wife told writer that son Mikal should be contacted when discharge orders are in and he will come to hospital to transport him home and will bring his portable oxygen with him. BS RN and charge RN updated.    Wife asked writer \"what if he gets home and I cannot handle him, can I call you and get him into a TCU?\"  Writer stated No, that she'd need to bring him back to the hospital again if he was not medically stable. Writer also stated to wife Roberta that a possible option is to have a family discussion if the home environment is not feasible or is unsafe for patient and her. Wife agreed. Writer asked hospitalist Dr. Johnston to write in discharge orders to resume home care services prior to hospital admission. CM will continue to follow for further discharge needs.        En Og RN  [facility]  Inpatient Care Management - FLOCHELO SALINAS RN Mobile: [phone] daily 7:30-4:00          "

## 2024-02-28 NOTE — PROGRESS NOTES
[facility]    Patient is currently receiving services with [facility]. The patient is currently receiving RN services.  Patient's  and home health team have been notified that patient is under inpatient status [facility] Liaison will continue to follow patient during stay. Please provide orders to resume home care at time of discharge if appropriate.

## 2024-02-28 NOTE — PLAN OF CARE
Goal Outcome Evaluation:       Discharge    Patient discharged to door 6 via wheelchair with RN    Care plan note:  A&O x2.  VSS, on 2 L O2 per home regimen. Soft bite size  diet, slightly thick liquids. Takes pills one at a time whole with liquids. Up with assist of 1 and walker. Denies pain.     Listed belongings gathered and given to patient (including from security/pharmacy). Yes  Care Plan and Patient education resolved: Yes  Prescriptions if needed, hard copies sent with patient  Yes  SW/care coordinator/charge RN aware of discharge: Yes

## 2024-02-28 NOTE — DISCHARGE SUMMARY
"[facility]  Hospitalist Discharge Summary      Date of Admission:  2/19/2024  Date of Discharge:  2/28/2024  Discharging Provider: Bucky Johnston MD  Discharge Service: Hospitalist Service    Discharge Diagnoses   Sepsis due to bilateral pneumonia  Loculated effusion of RLL, likely parapneumonic S/p thoracentesis 2/20/24  Acute encephalopathy due to sepsis  Suspect dementia with occasional agitation  Dysphagia, modified diet  Hypokalemia, resolved  Chronic obstructive pulmonary disease (COPD)  Chronic hypoxic respiratory failure on 3L O2 at home  History of recurrent Clostridium difficile (C diff) colitis, on oral vancomycin  Adenocarcinoma of the right lung s/p wedge resection 2019  FDG avid soft tissue nodule in the right tongue base, needs follow-up   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  Hx of venous thromboembolism (VTE), pulmonary embolism (PE)   Paroxysmal atrial fibrillation  Anemia, normocytic  Thrombocytopenia, resolved  Severe aortic stenosis s/p bioprosthetic aortic valve  Hypertension  Hyperlipidemia  Elevated BNP  Chronically elevated troponin  Hx of orthostatic hypotension  Benign prostatic hypertrophy (BPH)  History of GI bleed  Gastroesophageal reflux disease (GERD)  Acute kidney injury (FRED), resolved  Chronic kidney disease (CKD) stage 2  Depression/Anxiety/Pain  Deep Tissue Pressure Injury to sacrum and bilateral buttocks  Weakness and deconditioning, refusing TCU on discharge    Clinically Significant Risk Factors     # Cachexia: Estimated body mass index is 17.65 kg/m  as calculated from the following:    Height as of this encounter: 1.778 m (5' 10\").    Weight as of this encounter: 55.8 kg (123 lb).    # Severe Malnutrition: based on nutrition assessment      Follow-ups Needed After Discharge   Follow-up Appointments     Follow-up and recommended labs and tests       Follow up with primary care provider, Karson Bishop or " colleague,   within 7 days for hospital follow-up of pneumonia, Clostridium difficile   (C diff), tongue lesion, anemia, difficulty swallowing, medication review,   recheck labs.  The following labs/tests are recommended: CBC, basic   profile on day of visit, ordered and reviewed by primary clinic provider.              Unresulted Labs Ordered in the Past 30 Days of this Admission       No orders found from 1/20/2024 to 2/20/2024.        These results will be followed up by primary clinic provider     Discharge Disposition   Discharged to home  Condition at discharge: Stable    Hospital Course   Hermilo Velasquez is a 89 year old male with a history of lung cancer s/p wedge resection, recurrent c.diff, afib, chronic obstructive pulmonary disease, pulmonary embolism, aortic stenosis s/p bioprosthetic heart valve, chronic kidney disease, admitted on 2/19/2024 with worsening cough and increasing O2 needs with CT showing bilateral pneumonia with loculated effusion. For details, see admission note.      Sepsis due to bilateral pneumonia  Loculated effusion of RLL, likely parapneumonic S/p thoracentesis 2/20/24  CT showed increased consolidation of the RLL and new patchy infiltrates in the LLL concerning for progressive bilateral pneumonia. Also noted is loculated effusion on the right, progressed from prior. With increased O2 needs, new leukocytosis, procal of 21 and fever felt likely infectious source. Noted history of dysphagia so aspiration pneumonia a possibility though patient notes stability in his swallowing/eating. Patient was initially started on Cefepime and vancomycin. ID was consulted and patient was transitioned to IV Cefepime and Flagyl. Case was previously reviewed with thoracic surgery who felt that there was no need for chest tube and patient is s/p thoracentesis.                  - Blood Cx: No growth                - Pleural Fluid clotted off and cell count/differential could not be completed                - Gram stain without organisms and culture negative                  - Spo2 goal > 88%                             - Wean oxygen as able ; on chronic oxygen prior to admission 3L NC, history of chronic obstructive pulmonary disease (COPD)                   - Initial IV Cefepime and Flagyl in hospital                            - Antibiotics discontinued per ID recommendations after completing treatment course for pneumonia                  - ID consulted and following in hospital, signed off 2/25                            - 7 day course of antibiotics for pneumonia recommended and completed  - Pleural fluid cultures, blood cultures negative  - Follow-up chest x-ray imaging to be reviewed by primary clinic provider at follow-up clinic visit to help assure resolution of pneumonia     - speech and language therapy (SLP) consulted   - S/p VFSS 2/23/24   - Showed moderate oral dysphagia, mild pharyngeal dysphagia   - Continue modified diet, SLP follow-up is recommended, primary clinic provider to review at upcoming visit      Acute encephalopathy due to sepsis  Suspect dementia with occasional agitation  By report, unclear how far off baseline mental status, noted to have similar issues during his last hospital stay with negative head CT and brain MRI. Head CT in the ED on 2/19 negative. Also noted to have significant cognitive deficits and memory issues per previous hospital stay.   -Treatment of sepsis as above.  - PRN olanzapine (Zyprexa) in hospital, discontinued on discharge  - Previous hospitalist discussed case with family and they requested decreasing dose of gabapentin and Cymbalta; lower doses continued on discharge, primary clinic provider to review at upcoming visit     Dysphagia, modified diet  - Continue dysphagia diet  - Speech consulted in hospital; follow-up with primary clinic provider, consider outpatient speech and language therapy follow-up   - Encourage oral intake     Hypokalemia, resolved  -  Replaced per protocol in hospital  - Magnesium level recently normal  - Follow-up labs with primary clinic provider at upcoming clinic visit  Recent Labs   Lab 02/26/24  1014 02/25/24  1842 02/25/24  0717 02/24/24  0753 02/23/24  1730 02/23/24  0720   POTASSIUM 4.3 3.4 3.0* 3.8 3.4 3.2*  3.2*      Chronic obstructive pulmonary disease (COPD)  Chronic hypoxic respiratory failure on 3L O2 at home  No significant wheezing recently, low suspicion of acute COPD exacerbation at this time  - Continue PTA inhalers  - Wean O2 as above, encourage spirometry  - Treatment for pneumonia as above  - Home oxygen resumed on discharge as prior to admission, 2 to 3 L by NC, continuous     History of recurrent Clostridium difficile (C diff) colitis, on oral vancomycin  Diarrhea reported on January 14 prior to last admission admission. Previously recommended that the patient follow up at [facility] for fecal transplantation, but he has not. ID note from 11/28/23 had recommended a prolonged taper of vancomycin. Previously he had been recommended to continue po vancomycin indefinitely until fecal transplant. *Of note, oral vancomycin was discontinued during his last admission in January due to lack of diarrhea. Vanco restarted during last admission.   - Continue vancomycin PO (refusing at times) twice daily, as per ID recommendations, prescribed on hospital discharge; treatment course and future tapering to be reviewed by primary clinic provider at upcoming visit  - Patient to follow up with [facility] for fecal transplant after discharge,primary clinic provider to review and arrange     Adenocarcinoma of the right lung s/p wedge resection 2019  Follows with [place] oncology who has previously noted that the patient is not a chemo candidate. Planned for pet scan and biopsy of nodule on 1/9 was cancelled by the patient  - Outpatient follow-up with oncology, primary clinic provider to review/arrange  - Cytology on pleural fluid 2/20, negative  "for malignancy; acute inflammation noted     FDG avid soft tissue nodule in the right tongue base, needs follow-up   Concern for squamous cell carcinoma; also concern if causing aspiration and PNA.  - Appointment with ENT on 1/11 was cancelled to work up the lung first.  - Wife planned to make appointment with ENT after most recent admission.   - ENT  2/23/24: \"Interval PET imaging with diagnostic CT neck with contrast for further characterization would be necessary and recommended before considering biopsy. Ultimately, direct laryngoscopy with biopsy in the operating room would be necessary to obtain tissue diagnosis from this site. Would not recommend any further imaging or surgical intervention until determination from medical oncology appropriateness for any chemotherapeutic management. Findings in the base of tongue would not be treated surgically at this time. 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     Hx of VTE/pulmonary embolism (PE)   History of subsegmental pulmonary embolism (PE) on 11/25/23  - Not on anticoagulation due to gi bleed. Hx of IVC filter in the past, not clear if it was removed.  - follow-up with primary clinic provider      Paroxysmal atrial fibrillation  Not on anticoagulation due to anemia thought to be due to a GI bleed. Has history of angioextasia and diverticulosis. Tachycardia on admission, driven by infection.   -Tachycardia has improved although intermittently tachy due to agitation and likely dehydration/poor PO, recently monitored on telemetry - stable     Anemia, normocytic  Thrombocytopenia, resolved  Chronic anemia, stable at baseline 9  - Drop in Platelet count suspect related to sepsis. Patient runs low count  intermittently, monitor  - Follow-up labs with primary clinic provider   Recent Labs   Lab " 02/25/24  0717 02/23/24  0720 02/22/24  0811   HGB 9.4* 8.9* 9.1*     Recent Labs   Lab 02/25/24  0717 02/23/24  0720 02/22/24  0811    126* 122*     Severe aortic stenosis s/p bioprosthetic aortic valve  Hypertension  Hyperlipidemia  Elevated BNP  Chronically elevated troponin  Hx of orthostatic hypotension  Troponin initially 107, 90 on recheck. Both troponin and BNP (30k) higher than previous. Suspect due to sepsis.   - Telemetry in hospital  - transthoracic echocardiogram (TTE) 2/21/24, limited study, see report  - Continue prior to admission fludrocortisone for history of hypotension  - Monitor, follow-up with primary clinic provider and cardiology     Benign prostatic hypertrophy (BPH)  - Continue PTA tamsulosin (Flomax)  - Monitor for urinary retention, bladder scan PRN     History of GI bleed  Gastroesophageal reflux disease (GERD)  Anemia  - Continue PTA famotidine, allergic to PPI  - Monitor hemoglobin as above     Acute kidney injury (FRED), resolved  Chronic kidney disease (CKD) stage 2  Baseline creatinine around 0.8 but elevated to 1.51 on admission, due to sepsis.   - Creatinine normal; FRED resolved  Recent Labs   Lab 02/23/24  0720 02/22/24  0811 02/21/24  0814   CR 0.80 0.85 1.01      Depression/Anxiety/Pain  - PTA Duloxetine and gabapentin doses reduced per family request; monitor     Deep Tissue Pressure Injury to sacrum and bilateral buttocks  - WOC consulted, ongoing wound care and follow-up with primary clinic provider is recommended; home care on discharge    Weakness and deconditioning    -physical therapy (PT), occupational therapy (OT) consulted with recommendations for TCU; patient refused repeatedly; home care services resumed on discharge as prior to admission, see orders    Patient will call or seek medical attention if any worrisome signs or symptoms develop after discharge.  Patient agrees with plan as outlined and will follow up as recommended and outlined in the dismissal  summary.  Patient's wife called on day of discharge with care plan reviewed.  She is accepting of patient returning home and his refusal to go to TCU.    Consultations This Hospital Stay   PHARMACY TO DOSE VANCO  WOUND OSTOMY CONTINENCE NURSE  IP CONSULT  PHYSICAL THERAPY ADULT IP CONSULT  SPEECH LANGUAGE PATH ADULT IP CONSULT  PHARMACY TO DOSE VANCO  CARE MANAGEMENT / SOCIAL WORK IP CONSULT  THORACIC SURGERY IP CONSULT  INFECTIOUS DISEASES IP CONSULT  PALLIATIVE CARE ADULT IP CONSULT  VASCULAR ACCESS ADULT IP CONSULT  VASCULAR ACCESS ADULT IP CONSULT  VASCULAR ACCESS ADULT IP CONSULT  SPIRITUAL HEALTH SERVICES IP CONSULT  VASCULAR ACCESS ADULT IP CONSULT  ENT IP CONSULT    Code Status   Full Code    Time Spent on this Encounter   I, Bucky Johnston MD, personally saw the patient today and spent greater than 30 minutes discharging this patient.       Bucky Johnston MD  [facility]  [address] [place] [address]  Phone: [phone]  Fax: [phone]  ______________________________________________________________________    Physical Exam   Vital Signs: Temp: 97.8  F (36.6  C) Temp src: Oral BP: 137/89 Pulse: 112   Resp: 18 SpO2: 96 % O2 Device: Nasal cannula Oxygen Delivery: 2 LPM  Weight: 123 lbs 0 oz    GENERAL awake and alert, sitting in chair in no acute distress, requesting to be discharged home and repeatedly refusing to go to transitional care unit (TCU) for rehabilitation   LUNGS no wheezes or crackles  HEART S1, S2 with RRR  ABDOMEN soft, nontender  MUSCULOSKELETAL extremities without significant edema  SKIN warm and dry  NEURO moves upper and lower extremities spontaneously and to command  MENTAL STATUS answering questions and following simple commands       Primary Care Physician   Karson Bishop    Discharge Orders      Medication Therapy Management Referral      Reason for your hospital stay    Pneumonia.  IV antibiotics in the hospital and  infectious disease service consulted.  Completed antibiotics by time of discharge.  Follow-up with primary clinic provider within one week is recommended with recheck labs.     Follow-up and recommended labs and tests     Follow up with primary care provider, Karson Bishop or colleague, within 7 days for hospital follow-up of pneumonia, Clostridium difficile (C diff), tongue lesion, anemia, difficulty swallowing, medication review, recheck labs.  The following labs/tests are recommended: CBC, basic profile on day of visit, ordered and reviewed by primary clinic provider.     Activity    Your activity upon discharge: activity as tolerated     Resume Home Care Services    [facility] - Resume home care services with PT, OT, SN, SW, HHA     Oxygen Adult/Peds    Resume oxygen as used prior to admission, 2 to 3 L continuous NC, history of chronic obstructive pulmonary disease (COPD) with recent pneumonia.    Oxygen Documentation  I certify that this patient, Hermilo Velasquez has been under my care (or a nurse practitioner or physician's assistant working with me). This is the face-to-face encounter for oxygen medical necessity.      At the time of this encounter, I have reviewed the qualifying testing and have determined that supplemental oxygen is reasonable and necessary and is expected to improve the patient's condition in a home setting.       Patient has continued oxygen desaturation due to COPD J44.9.    If portability is ordered, is the patient mobile within the home? yes     Diet    Follow this diet upon discharge: Orders Placed This Encounter      Snacks/Supplements Adult: Gelatein Plus; With Meals      Room Service      Combination Diet Regular Diet Adult; Soft and Bite Sized Diet (level 6); Slightly Thick (level 1)       Significant Results and Procedures   Most Recent 3 CBC's:  Recent Labs   Lab Test 02/25/24  0717 02/23/24  0720 02/22/24  0811   WBC 7.9 8.1 9.7   HGB 9.4* 8.9* 9.1*   MCV 86 83 83     126* 122*     Most Recent 3 BMP's:  Recent Labs   Lab Test 02/26/24  1014 02/25/24  1842 02/25/24  0717 02/23/24  1730 02/23/24  0720 02/22/24  2309 02/22/24  0811 02/21/24  0814   NA  --   --   --   --  144  --  143 141   POTASSIUM 4.3 3.4 3.0*   < > 3.2*  3.2*   < > 2.9* 3.6   CHLORIDE  --   --   --   --  108*  --  107 106   CO2  --   --   --   --  26  --  27 28   BUN  --   --   --   --  17.5  --  20.9 23.6*   CR  --   --   --   --  0.80  --  0.85 1.01   ANIONGAP  --   --   --   --  10  --  9 7   AMRITA  --   --   --   --  8.9  --  8.8 8.6   GLC  --   --   --   --  98  --  97 120*    < > = values in this interval not displayed.     Most Recent 2 LFT's:  Recent Labs   Lab Test 02/19/24  2054 01/15/24  1855   AST 17 60*   ALT 7 15   ALKPHOS 93 102   BILITOTAL 0.9 0.9   ,   Results for orders placed or performed during the hospital encounter of 02/19/24   XR Chest Port 1 View    Narrative    XR CHEST PORT 1 VIEW  2/19/2024 9:12 PM       INDICATION: SOB  COMPARISON: 1/15/2024       Impression    IMPRESSION: Small right pleural effusion. Sternotomy wires and AVR  staple line right lower lung. Mild right basilar infiltrate or  atelectasis, improved from previous. The left lung remains clear.    MARGARITA ADAMS MD         SYSTEM ID:  [id number]   Head CT w/o contrast    Narrative    EXAM: CT HEAD W/O CONTRAST  LOCATION: [facility]  DATE: 2/19/2024    INDICATION: AMS  COMPARISON: 01/17/2024  TECHNIQUE: Routine CT Head without IV contrast. Multiplanar reformats. Dose reduction techniques were used.    FINDINGS:  INTRACRANIAL CONTENTS: No intracranial hemorrhage, extraaxial collection, or mass effect.  No CT evidence of acute infarct. Mild presumed chronic small vessel ischemic changes. Mild to moderate generalized volume loss. No hydrocephalus.     VISUALIZED ORBITS/SINUSES/MASTOIDS: No intraorbital abnormality. Scattered nonspecific mucosal thickening. No middle ear or mastoid effusion.    BONES/SOFT  TISSUES: No acute abnormality.      Impression    IMPRESSION:  1.  No CT evidence for acute intracranial process.  2.  Brain atrophy and presumed chronic microvascular ischemic changes as above.   CT Chest (PE) Abdomen Pelvis w Contrast    Narrative    EXAM: CT CHEST PE ABDOMEN PELVIS W CONTRAST  LOCATION: [facility]  DATE: 2/19/2024    INDICATION: Hypoxia, hx of colitis. Shortness of breath. History of lung cancer.  COMPARISON: 11/26/2023, 09/12/2023, 09/11/2023 and multiple other priors.  TECHNIQUE: CT chest pulmonary angiogram and routine CT abdomen pelvis with IV contrast. Arterial phase through the chest and venous phase through the abdomen and pelvis. Multiplanar reformats and MIP reconstructions were performed. Dose reduction   techniques were used.   CONTRAST: 70 mL [id number].    FINDINGS:  ANGIOGRAM CHEST: Pulmonary arteries are normal caliber and negative for pulmonary emboli. Normal-caliber thoracic aorta. No evidence for dissection allowing for phase of contrast opacification. Marked vascular calcification diffusely.     LUNGS AND PLEURA: Dense consolidative alveolar infiltrate right lower lobe with air bronchogram formation compatible with right lower lobe pneumonia. Patchy infiltrate left lower lobe compatible with pneumonia. Moderate emphysema. Loculated pleural fluid   right hemithorax in the posterolateral costophrenic angle and posterior upper aspect of the right hemithorax.    MEDIASTINUM/AXILLAE: Cardiac enlargement with biatrial enlargement. No pericardial fluid. Normal-caliber esophagus. Mildly prominent mediastinal and hilar lymph nodes likely reactive.    CORONARY ARTERY CALCIFICATION: Moderate.    HEPATOBILIARY: Hepatic cirrhosis. Unchanged hepatic cysts. Gallbladder and biliary system unremarkable.    PANCREAS: Diffuse atrophy. No ductal dilatation or inflammatory change.    SPLEEN: Normal.    ADRENAL GLANDS: Normal.    KIDNEYS/BLADDER: Mild atrophy. Cortical  scarring bilaterally. No urinary collecting system dilatation or calculi. Bladder unremarkable. Moderate prostate enlargement.    BOWEL: No obstruction or inflammatory change.    LYMPH NODES: No lymphadenopathy.    VASCULATURE: Unchanged infrarenal abdominal aortic aneurysmal dilatation. No aortic dissection. Marked atherosclerotic vascular calcification.    PELVIC ORGANS: No free fluid.    MUSCULOSKELETAL: Marked degenerative changes throughout the spine.      Impression    IMPRESSION:  1.  Significant interval increase in consolidative alveolar infiltrate throughout the right lower lobe. Greater patchy alveolar infiltrates in the left lower lobe obscuring prior pulmonary nodularity to the left lower lobe. Findings compatible with   progression of bilateral pneumonia.    2.  Loculated pleural fluid posterior right costophrenic angle and posterior right upper hemithorax is increased since prior.    3.  No pulmonary embolism.     4.  Prior right lower lobe and left lower lobe pulmonary embolic disease has resolved.    5.  Normal-caliber aorta without dissection.    6.  Hepatic cirrhosis with unchanged hepatic cyst.   US Thoracentesis    Narrative    EXAM:  1. RIGHT THORACENTESIS  2. ULTRASOUND GUIDANCE  LOCATION: [facility]  DATE/TIME: 2/20/2024 1:42 PM    INDICATION: Pleural effusion.    PROCEDURE: Informed consent obtained. Time out performed. The chest  was prepped and draped in a sterile fashion. 10 mL of 1% lidocaine was  infused into local soft tissues. A 5 Croatian catheter system was  introduced into the pleural effusion under ultrasound guidance.    0.3 liters of clear fluid were removed and sent to lab if requested.    Patient tolerated procedure well.    Ultrasound images have been permanently captured for documentation.      Impression    IMPRESSION:  Status post ultrasound-guided right thoracentesis.    MARGARITA ADAMS MD         SYSTEM ID:  [id number]   XR Video Swallow with SLP or OT    Narrative     VIDEO SWALLOWING EVALUATION   2024 11:16 AM     HISTORY: Aspiration risk.    COMPARISON: 10/11/2022.    FLUOROSCOPY TIME: 3 minutes.  SPOT IMAGES OR CINE RUNS: 9      Impression    IMPRESSION:  Thin: Moderate to deep penetration with the cup with a small amount of  laryngeal residue. With an effective chin tuck, no definite new  penetration observed with the teaspoon or cup.    Slightly thick: No penetration. Mild pharyngeal residue.    Puree: No penetration. Mild to moderate oral and pharyngeal residue  which improves with a slightly thick liquid wash and subsequent  swallows.    Semisolid: Not administered.    Solid: Not administered.    This study only includes the cervical esophagus. Please see separate  report from speech pathology for additional details.     FUENTES DE DIOS MD         SYSTEM ID:  [id number]   Echo Limited    Narrative    [id number]  Critical access hospital  [id number]  [id number]^FRITZ^VERONIKA     [facility]  [facility]chocardiography [facility]  [address]  [place] [address]  Phone [phone]  Fax [phone]     Name: CLYDE RODRIGUEZ  MRN: [id number]  : 1932  Study Date: 2024 10:51 AM  Age: 89 yrs  Gender: Male  Patient Location: hospitals  Reason For Study: CHF  Ordering Physician: VERONIKA HU  Referring Physician: VERONIKA HU  Performed By: MORIS Holland     BSA: 1.8 m2  Height: 70 in  Weight: 138 lb  HR: 89  BP: 113/68 mmHg  ______________________________________________________________________________  Procedure  (Suboptimal images, abbreviated study performed).     ______________________________________________________________________________  Interpretation Summary     Technically limited study due to patient intolerance of the study.     The left ventricle is not well visualized. Due to the poor quality of the  echocardiogram, an assessment of left ventricular ejection fraction cannot be  made.  Right  ventricle is not well visualized, however global systolic function is  probably at least mildly reduced.  No obvious pericardial effusion.  Status post aortic valve replacement with a 25-mm St. Arsen Trifecta  bioprosthetic valve. Valve not well visualized. Doppler evaluation suboptimal  for assessment.  The patient exhibited frequent PVCs.  ______________________________________________________________________________  Left Ventricle  The left ventricle is not well visualized. Due to the poor quality of the  echocardiogram, an assessment of left ventricular ejection fraction cannot be  made.     Right Ventricle  The right ventricle is not well visualized. Right ventricle is not well  visualized, however global systolic function is probably at least mildly  reduced.     Mitral Valve  There is moderate mitral annular calcification.     Tricuspid Valve  The tricuspid valve is not well visualized, but is grossly normal.     Aortic Valve  There is a bioprosthetic aortic valve. Status post aortic valve replacement  with a 25-mm [employer]sen Trifecta bioprosthetic valve. Valve not well visualized.  Doppler evaluation suboptimal for assessment.     Pericardium  No obvious pericardial effusion.     Rhythm  The patient exhibited frequent PVCs.  ______________________________________________________________________________  MMode/2D Measurements & Calculations  IVSd: 0.98 cm  LVIDd: 5.3 cm  LVIDs: 4.7 cm  LVPWd: 0.92 cm  FS: 12.2 %  LV mass(C)d: 188.2 grams  LV mass(C)dI: 105.6 grams/m2  Ao root diam: 3.0 cm  LVOT diam: 2.1 cm  LVOT area: 3.3 cm2  Ao root diam index Ht(cm/m): 1.7  Ao root diam index BSA (cm/m2): 1.7  RWT: 0.35     ______________________________________________________________________________  Report approved by: Calli Leigh 02/21/2024 04:00 PM           *Note: Due to a large number of results and/or encounters for the requested time period, some results have not been displayed. A complete set of results can be  found in Results Review.       Discharge Medications   Current Discharge Medication List        CONTINUE these medications which have CHANGED    Details   DULoxetine 40 MG CPEP Take 40 mg by mouth daily Note dose reduction compared to prior to admission; refills to primary clinic provider  Qty: 30 capsule, Refills: 0    Associated Diagnoses: Mood disorder (H24)      famotidine (PEPCID) 20 MG tablet Take 1 tablet (20 mg) by mouth daily Note dose reduction compared to prior to admission; refills to primary clinic provider  Qty: 30 tablet, Refills: 0    Associated Diagnoses: Gastroesophageal reflux disease, unspecified whether esophagitis present      ferrous sulfate (FE TABS) 325 (65 Fe) MG EC tablet Take 1 tablet (325 mg) by mouth daily    Associated Diagnoses: Iron deficiency      gabapentin (NEURONTIN) 400 MG capsule Take 1 capsule (400 mg) by mouth at bedtime Note dose reduction compared to prior to admission; refills to primary clinic provider  Qty: 30 capsule, Refills: 0    Associated Diagnoses: Pain      vancomycin (FIRVANQ) 50 MG/ML oral solution Take 2.5 mLs (125 mg) by mouth 2 times daily Future dosing, taper, and refills directed to primary clinic provider, review at upcoming visit  Qty: 150 mL, Refills: 0    Associated Diagnoses: Colitis due to Clostridium difficile           CONTINUE these medications which have NOT CHANGED    Details   acetaminophen (TYLENOL) 500 MG tablet Take 1,000 mg by mouth 3 times daily as needed      albuterol (PROAIR HFA/PROVENTIL HFA/VENTOLIN HFA) 108 (90 Base) MCG/ACT inhaler Inhale 2 puffs into the lungs every 6 hours as needed      atorvastatin (LIPITOR) 10 MG tablet Take 1 tablet (10 mg) by mouth daily  Qty: 90 tablet, Refills: 0    Associated Diagnoses: Hyperlipidemia LDL goal <100      calcium carbonate (TUMS) 500 MG chewable tablet Take 1 chew tab by mouth 2 times daily as needed for heartburn      fludrocortisone (FLORINEF) 0.1 MG tablet Take 1 tablet (0.1 mg) by mouth  "daily  Qty: 90 tablet, Refills: 3    Associated Diagnoses: Orthostatic hypotension      ofloxacin (FLOXIN) 0.3 % otic solution Place 10 drops Into the left ear 2 times daily Take until supply is gone      ondansetron (ZOFRAN ODT) 4 MG ODT tab Take 1 tablet (4 mg) by mouth every 6 hours as needed for nausea    Associated Diagnoses: Nausea      potassium chloride ER (KLOR-CON M) 20 MEQ CR tablet Take 20 mEq by mouth daily      tamsulosin (FLOMAX) 0.4 MG capsule Take 1 capsule (0.4 mg) by mouth daily    Associated Diagnoses: Benign prostatic hyperplasia, unspecified whether lower urinary tract symptoms present      tiotropium (SPIRIVA) 18 MCG inhaled capsule Inhale 18 mcg into the lungs daily           Allergies   Allergies   Allergen Reactions    Omeprazole Itching    Pantoprazole Itching    Prevacid [Lansoprazole] Itching    Lasix [Furosemide] Rash    Lidocaine Blisters and Rash     Allergy to lidocaine ointment  Allergy to lidocaine ointment      Penicillin G Rash    Penicillins Rash     \"broke out from injection\" 60 yrs ago  Tolerates cephalosporins     "

## 2024-02-28 NOTE — PLAN OF CARE
Speech Language Therapy Discharge Summary    Reason for therapy discharge:    Discharged to home with home therapy.    Progress towards therapy goal(s). See goals on Care Plan in [facility] electronic health record for goal details.  Goals partially met.  Barriers to achieving goals:   discharge from facility.    Therapy recommendation(s):    Continued therapy is recommended.  Rationale/Recommendations:  Ongoing skilled services are required for ongoing management of dysphagia, modified diet, and education. Current SLP recommendations - Oropharyngeal dysphagia, acute on chronic. Continue soft/bite sized solids, upgrade to slightly thick liquids (IDDSI 6, 1). Upright position, small bites/sips, alternate between solids/liquids for oral clearance, slow rate of intake. SLP to train in chin tuck, potential upgrades to thin if pt able to accurately/consistently complete -- deep penetration + risk for aspiration when not utilizing.

## 2024-02-28 NOTE — PROGRESS NOTES
Patient is Alert to self, ambulated to the BR with 1 PA, W/GB. Cooperative, took mall scheduled meds. Denies pain. On 1L of O2. Refused blood draw this morning.

## 2024-02-29 NOTE — PLAN OF CARE
"Physical Therapy Discharge Summary    Reason for therapy discharge:    Discharged to home with home therapy.    Progress towards therapy goal(s). See goals on Care Plan in [facility] electronic health record for goal details.  Goals not met.  Barriers to achieving goals:   discharge from facility.    Therapy recommendation(s):    Continued therapy is recommended.  Rationale/Recommendations:  Per last treating therapist note:\"Pt making limited progress d/t confusion and continues to be below baseline, limited by decreased activity tolerance, weakness and SOB. PT will benefit from continued skilled PT intervention upon discharge. After TCU pt would benefit from higher level of support at LTC.\" However nursing note indicates pt and family refused TCU and took pt home with resumption of home care services.      "

## 2024-03-04 NOTE — TELEPHONE ENCOUNTER
Order/Referral Request    Who is requesting: [facility]    Orders being requested: Resumption of care, Skilled nursing one visit per week for four weeks, one visit every two weeks for four weeks.  PT OT evaluation.  Medical social work evaluation within 21 days. Homehealth aid one visit per week for four weeks.    Reason service is needed/diagnosis: Just got out of the hospital with pneumonia    When are orders needed by: asap    Has this been discussed with Provider: Yes    Does patient have a preference on a Group/Provider/Facility? [facility]    Does patient have an appointment scheduled?: No    Where to send orders:  Verbal confidential vm    Could we send this information to you in [facility] or would you prefer to receive a phone call?:   Patient would prefer a phone call   Okay to leave a detailed message?: Yes at Other phone number:  [phone]

## 2024-03-04 NOTE — ED PROVIDER NOTES
History     Chief Complaint:  Fall and Fatigue    The history is provided by the patient.     Hermilo Velasquez is a 89 year old male with history of hyperlipidemia, anemia, CKD, atrial fibrillation, and COPD presenting via EMS for evaluation of a fall and fatigue. Hermilo states that he lost his balance and fell earlier today, now noting pain in his left hand and low back. He denies loss of consciousness. He denies use of blood thinners. He states that he lives at home with his wife and has children in the area. He adds that this is his second fall recently. He notes use of oxygen at baseline.    Independent Historian:   None - Patient Only    Review of External Notes:   I reviewed the discharge summary from 2/28 for sepsis due to pneumonia and encephalopathy. He recently completed a course of antibiotics for his pneumonia.    Medications:    Albuterol  Lipitor  Pepcid  Florinef  Neurontin  Zofran  Flomax  Spiriva  Firvanq  Aspirin  Protonix  Atarax  Xarelto  Celebrex  Prevacid  Cymbalta    Past Medical History:    AAA  Adenocarcinoma, lung, right  Aortic stenosis  Atrial fibrillation  Bullous pemphigoid  CKD  COPD  DVT  GERD  Heart murmur  Iron deficiency anemia  Monoclonal gammopathy  Neuropathy  Hyperlipidemia  Other malignant lymphomas  PE  RBBB  Severe sepsis with acute organ dysfunction  Non-Hodgkin's lymphoma  Malignant neoplasm of lung  FRED  GI hemorrhage  Chronic respiratory failure with hypoxia  Tobacco dependence syndrome    Past Surgical History:    Appendectomy  Arthroplasty knee, right  TKA  Back surgery  EGD x6  Hernia repair  Herniorrhaphy ventral  Arthroscopic right knee surgery  Lobectomy lung  Repair ligament ankle  Replace valve aortic  Rotator cuff repair RT/LT  Spine surgery x3  Thoracotomy, wedge resection lung, combined, right  Tonsillectomy  TURP    Physical Exam   Patient Vitals for the past 24 hrs:   BP Temp Temp src Pulse Resp SpO2   03/04/24 1323 (!) 140/83 -- -- 71 -- 100 %    03/04/24 1140 (!) 126/91 97.2  F (36.2  C) Temporal 98 20 94 %     Physical Exam  General: Alert, interactive   Head:  Scalp is atraumatic  Eyes:  The pupils are equal, round, and reactive to light    EOM's intact    No scleral icterus  ENT:      Nose:  The external nose is normal  Ears:  External ears are normal  Mouth/Throat: Mucus membranes are moist      Neck:  Normal range of motion.      There is no rigidity.    Trachea is in the midline         CV:  Irregularly irregular rate and rhythm    No murmur   Resp:  Breath sounds are clear bilaterally    Non-labored, no retractions or accessory muscle use      GI:  Abdomen is soft, no distension, no tenderness.       MS:  Normal strength in all 4 extremities    Tenderness to the lumbar spine  Skin:  Warm and dry, No rash noted.    Skin tear on left hand and left elbow  Neuro:   Strength 5/5 x4.  Sensation intact  In all 4 extremities.     GCS: 15  Psych: Awake. Alert.  Normal affect.      Appropriate interactions.    Emergency Department Course   ECG  ECG taken at 1201, ECG read at 1214  Atrial fibrillation  Left axis deviation  Right bundle branch block  Abnormal ECG   Rate 86 bpm. DE interval * ms. QRS duration 148 ms. QT/QTc 434/519 ms. P-R-T axes * -42 75.     Imaging:  CT Head w/o Contrast   Final Result   IMPRESSION:   No acute intracranial abnormality. Chronic changes as   above.         BRIDGETT SPEARS MD            SYSTEM ID:  [id number]      XR Lumbar Spine 2/3 Views   Final Result   IMPRESSION: Posterior element degenerative changes. No significant   loss of vertebral body height. No significant anterolisthesis or   retrolisthesis.      BRIDGETT SPEARS MD            SYSTEM ID:  [id number]      XR Pelvis 1/2 Views   Preliminary Result   IMPRESSION: No acute displaced pelvic or proximal femoral fracture   identified on this single frontal view. Diffuse bone demineralization.   Severe left and mild right hip osteoarthritis. Degenerative change   lower lumbar spine  and both SI joints. Arterial calcification.      XR Hand Left 3 Views   Final Result   IMPRESSION: Anatomic alignment left hand. No acute displaced left hand   fracture is identified. Advanced polyarticular arthritic change in the   hand and wrist. Periarticular bone demineralization. Radiopaque soft   tissue foreign body in the dorsal radial left wrist at the level of   the scaphoid. Wrist soft tissue swelling. Additional radiopaque   foreign bodies are suspected in the thumb and distal aspect of the   index finger. Arterial calcification.          CANDICE MASSEY MD            SYSTEM ID:  [id number]      XR Elbow Left G/E 3 Views   Final Result   IMPRESSION: Anatomic alignment left elbow. No acute displaced left   elbow fracture is identified. Mild degenerative changes left elbow is   most pronounced in the ulnotrochlear compartment. Small elbow joint   effusion suspected. Distal triceps insertional enthesopathy with   dorsal elbow soft tissue swelling. Radiopaque soft tissue foreign body   in the proximal left forearm overlying the proximal radial shaft.          CANDICE MASSEY MD            SYSTEM ID:  [id number]        Laboratory:  Labs Ordered and Resulted from Time of ED Arrival to Time of ED Departure   BASIC METABOLIC PANEL - Abnormal       Result Value    Sodium 139      Potassium 3.5      Chloride 100      Carbon Dioxide (CO2) 29      Anion Gap 10      Urea Nitrogen 12.9      Creatinine 0.84      GFR Estimate 82      Calcium 8.6      Glucose 100 (*)    CBC WITH PLATELETS AND DIFFERENTIAL - Abnormal    WBC Count 6.6      RBC Count 4.02 (*)     Hemoglobin 10.4 (*)     Hematocrit 33.8 (*)     MCV 84      MCH 25.9 (*)     MCHC 30.8 (*)     RDW 17.9 (*)     Platelet Count 159      % Neutrophils 78      % Lymphocytes 7      % Monocytes 12      % Eosinophils 3      % Basophils 0      % Immature Granulocytes 0      NRBCs per 100 WBC 0      Absolute Neutrophils 5.2      Absolute Lymphocytes 0.5 (*)     Absolute  Monocytes 0.8      Absolute Eosinophils 0.2      Absolute Basophils 0.0      Absolute Immature Granulocytes 0.0      Absolute NRBCs 0.0       Procedures    Emergency Department Course & Assessments:    Interventions:  Medications - No data to display     Assessments:  1153 I obtained history and examined the patient as noted above.   Nursing staff spoke with the patient's son, the son and wife will present to the emergency department to take the patient home.    The patient was ambulatory in the emergency department without incident.    The patient's skin tears were cleansed and dressed by the ED technician.    Independent Interpretation (X-rays, CTs, rhythm strip):  Radiograph of the left elbow demonstrates no obvious fractures, left hand demonstrates no obvious fractures, CT scan of the head demonstrates no obvious intracranial hemorrhage    Consultations/Discussion of Management or Tests:  None       Social Determinants of Health affecting care:   None    Disposition:  The patient was discharged.     Impression & Plan    Medical Decision Making:  Follow presentation history and physical examination were performed, the above workup was undertaken.  Thankfully workup was largely unremarkable.  His EKG is unremarkable outside of standard atrial fibrillation.  Advanced imaging of multiple areas was unremarkable.  Laboratory workup is reassuring.  Wounds were cleansed and dressed here.  He was ambulatory in the emergency department without incident.  He is requesting discharge home and I think this is reasonable.  We contacted his family members who are en route to pick him up.  They will follow-up with primary care provider return if new symptoms develop.    Diagnosis:    ICD-10-CM    1. Accident due to mechanical fall without injury, initial encounter  W19.XXXA       2. Acute left-sided low back pain without sciatica  M54.50       3. Skin tear of left hand without complication, initial encounter  S61.412A             Discharge Medications:  New Prescriptions    No medications on file     Scribe Disclosure:  I, Bishnu Dietrich, am serving as a scribe at 11:50 AM on 3/4/2024 to document services personally performed by Lane Moctezuma MD based on my observations and the provider's statements to me.   3/4/2024   Lane Moctezuma MD Trigger, Brandon Thomas, MD  03/04/24 1820

## 2024-03-04 NOTE — ED NOTES
Bed: ED10  Expected date:   Expected time:   Means of arrival:   Comments:  [facility] 429 89M fall/weak

## 2024-03-04 NOTE — ED TRIAGE NOTES
Patient BIBA from home after losing balance and falling in bathroom. Denies hitting head, no thinners. Patient has skin tear to left elbow and left hand. Has fallen more lately per EMS report. Patient recently discharged with sepsis/PNA, finished atx. Lives at home with spouse.     Triage Assessment (Adult)       Row Name 03/04/24 1141          Triage Assessment    Airway WDL WDL        Respiratory WDL    Respiratory WDL X;all     Rhythm/Pattern, Respiratory dyspnea upon exertion;shortness of breath  Chronic o2        Peripheral/Neurovascular WDL    Peripheral Neurovascular WDL WDL

## 2024-03-07 PROBLEM — R62.7 ADULT FAILURE TO THRIVE: Status: ACTIVE | Noted: 2024-01-01

## 2024-03-07 PROBLEM — R41.82 ALTERED MENTAL STATUS, UNSPECIFIED ALTERED MENTAL STATUS TYPE: Status: ACTIVE | Noted: 2024-01-01

## 2024-03-07 NOTE — ED NOTES
Bed: ST03  Expected date:   Expected time:   Means of arrival:   Comments:  425  89 M change in status/withdraws from pain  1224

## 2024-03-07 NOTE — H&P
"[facility]    History and Physical - Hospitalist Service       Date of Admission:  3/7/2024    Assessment & Plan      Hermilo Velasquez is a 89 year old male with a past medical history significant for lung cancer s/p wedge resection, recurrent c.diff, afib, chronic obstructive pulmonary disease, pulmonary embolism, aortic stenosis s/p bioprosthetic heart valve, chronic kidney disease admitted on 3/7/2024 for further evaluation of altered mental status.     Patient presents to the ED hemodynamically stable. SpO2 93% on 5 L NC (chronically on 3L O2 baseline). Temperature 98.9. Per EMS and patient's wife, patient's health has been declining over past three months. He experienced a fall 03/04/2024 for which he was seen in the ED and had a negative head CT. Wife states patient sat down in a chair yesterday afternoon (03/06/2024) and essentially hasn't gotten up since. He was noted to be non-verbal upon EMS arrival, although he responded to pain. Upon arrival to [facility] ED, patient open eyes to voice and will respond verbally with \"yes or no.\" Wife notes while patient has had difficulty communicating since his nodule in his mouth has grown larger, he does usually communicate regularly with her at home. Patient is very lethargic upon examination and unable to provide his own history. Labs in the ED generally unremarkable with no leukocytosis. Procalcitonin 0.79 (improved from 21.39 from 02/19 hospital stay). Lactate WNL (1.9). Blood cultures obtained and pending. No significant electrolyte abnormality or worsening of his chronic anemia (hgb 11.1 and baseline 9.0). Glucose 116. . VBGs notable for a pH 7.36, CO2 67, Bicarb 38. UA and urine drug screen obtained and negative for infection. Influenza, COVID and RSV pending. Ketone quantitative <0.18. Urine drug screen negative. CXR demonstrated stable size of cardiac silhouette status post median sternotomy and aortic valve replacement. " Slight improvement in aeration in the right lower lobe with decreased conspicuity of airspace disease since prior exam. Persistent small bilateral pleural effusions, right larger than left. No pneumothorax. CT negative for acute intracranial pathology.     Of note, patient has history of encephalopathy during several past hospital stays, most recently for sepsis secondary to bilateral pneumonia 02/19/2024 - 02/28/2024.     Failure to thrive  Goals of care  *Overall, this is patient's 7th admission since 08/2023 with continued ongoing decline at home. TCU has been recommended multiple times and at every discharge, however, patient continues to decline. Palliative care has been included in past hospitalizations to discuss goals of care. Patient wants restorative care including full code. Today, wife stating she is unable to manage patient at home and is agreeable to TCU, should patient be appropriate for TCU placement.   - Palliative care consult again requested, appreciate the cares.  - Attempted to call patient's son Mikal twice this afternoon for further discussion on goals of care although unable to reach him.   - PT/OT consulted, appreciate the cares.   - CM/SW consulted, appreciate the cares.     Acute encephalopathy   Suspect dementia with occasional delirium and agitation   *By report, unclear how far off baseline mental status. Patient has history of encephalopathy during several past hospital stays, most recently for sepsis secondary to bilateral pneumonia 02/19/2024 - 02/28/2024.   *CT head upon admission negative for acute intracranial pathology. Brain MRI obtained 01/2024 and demonstrated no acute intracranial process. Mild dural thickening and enhancement along the left lateral frontal lobe, nonspecific, but likely unchanged compared to prior brain MRI 06/28/2023. Generalized brain atrophy and presumed microvascular ischemic changes as detailed above.  - Continue gabapentin and cymbalta once med rec  "completed.   - Delirium prevention:   --Reorient patient at each interaction.  --Give patient window bed if possible.  --Keep room lights on and blinds open during day (8am-8pm), low lights 8pm-8am.  --Minimize interruptions of sleep at night (consolidate vitals, nursing assessments, medications).  - PRN PO Seroquel available and IV/IM.  - Continue to monitor with morning labs (CBC, CMP, lactate, VBG).     Acute on chronic hypoxic respiratory failure on 3L O2 at home  Chronic obstructive pulmonary disease (COPD)  Recent hospitalization fro sepsis secondary to bilateral pneumonia   *No significant wheezing recently, low suspicion of acute COPD exacerbation at this time. Requiring 5L O2 upon arrival.   - Spo2 goal > 88%.   - Wean from 5 L oxygen as able ; on chronic oxygen prior to admission 3L NC, history of chronic obstructive pulmonary disease (COPD).  - Continue PTA inhalers once med rec completed.   - Wean O2 as above, encourage spirometry.  - Consider steroid 03/08 if minimal improvement overnight.     FDG avid soft tissue nodule in the right tongue base, needs follow-up   *Concern for squamous cell carcinoma; also concern if causing aspiration and PNA.  - Appointment with ENT on 1/11 was cancelled to work up the lung first.  - Wife planned to make appointment with ENT several months ago.   - ENT  2/23/24: \"Interval PET imaging with diagnostic CT neck with contrast for further characterization would be necessary and recommended before considering biopsy. Ultimately, direct laryngoscopy with biopsy in the operating room would be necessary to obtain tissue diagnosis from this site. Would not recommend any further imaging or surgical intervention until determination from medical oncology appropriateness for any chemotherapeutic management. Findings in the base of tongue would not be treated surgically at this time. \"    - Follow-up with primary clinic provider upon discharge.     Dysphagia, modified diet  Suspect " secondary to soft tissue nodule noted above   - Continue dysphagia diet.  - Speech consulted in hospital last visit; recommended slightly thick diet.   - Encourage oral intake.  - Ensure enlive between meals.     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 Planned for pet scan and biopsy of nodule on 1/9 was cancelled by the patient.  - Cytology on pleural fluid 2/20, negative for malignancy; acute inflammation noted.  - Outpatient follow-up with oncology, primary clinic provider to review/arrange.    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     Hx of VTE/pulmonary embolism (PE)   *History of subsegmental pulmonary embolism (PE) on 11/25/23  - Not on anticoagulation due to GI bleed. Hx of IVC filter in the past, not clear if it was removed.  - Follow-up with primary clinic provider upon discharge.     Paroxysmal atrial fibrillation  *Not on anticoagulation due to anemia thought to be due to a GI bleed. Has history of angioextasia and diverticulosis.   - Telemetry.     Anemia, normocytic  Thrombocytopenia, resolved  *No worsening of patient's chronic anemia. Hgb 11.1 and baseline 9.0.   - Continue to monitor with morning labs.     Severe aortic stenosis s/p bioprosthetic aortic valve  Hypertension  Hyperlipidemia  Elevated BNP  Chronically elevated troponin  Hx of orthostatic hypotension  - Telemetry in hospital.  - Transthoracic echocardiogram (TTE) 2/21/24, limited study, see report.  - Continue prior to admission fludrocortisone for history of hypotension.   - Monitor, follow-up with primary clinic provider and cardiology upon discharge.      Benign prostatic hypertrophy (BPH)  - Continue PTA tamsulosin (Flomax) once med rec complete.   - Monitor for urinary retention, bladder scan PRN.      History of GI bleed  Gastroesophageal  reflux disease (GERD)  Anemia  - Continue PTA famotidine, allergic to PPI.  - Monitor hemoglobin as above.    Chronic kidney disease (CKD) stage 2  *Baseline creatinine around 0.8, 1.09 on admission.   - Start maintenance IV fluids; suspect due to dehydration and limited PO intake; decreased from 100 mL/hr to 50 mL/hr.   - Encourage PO intake.   - Avoid nephrotoxic agents as able.     Depression/Anxiety/Pain  - PTA Duloxetine and gabapentin doses reduced per family request last hospitalization. Continue reduced doses once med rec complete.      Deep Tissue Pressure Injury to sacrum and bilateral buttocks  - WOC consulted, appreciate the cares.      Weakness and deconditioning   - Physical therapy (PT), CM/SW consulted, appreciate the cares. Patient's wife believe's patient would now be served better in TCU versus home as she is unable to care for patient independently. Last hospitalization, however, patient refused repeatedly.         Diet: Combination Diet Regular Diet Adult; Mechanical/Dental Soft Diet; Slightly Thick (level 1)  Snacks/Supplements Adult: Ensure Enlive; Between Meals  DVT Prophylaxis: Pneumatic Compression Devices  Suazo Catheter: Not present  Lines: None     Cardiac Monitoring: ACTIVE order. Indication: Atrial Fibrillation  Code Status: Full Code    Disposition Plan      Expected Discharge Date: 03/09/2024                The patient's care was discussed with the Attending Physician, Dr. Sherie Diaz .    Alma Rosa Vazqeuz PA-C  Hospitalist Service  [facility]  Securely message with [employer] (more info)  Text page via [facility] Paging/Directory     ______________________________________________________________________    Chief Complaint   Altered mental status    History is obtained from the patient's wife and chart review    History of Present Illness   Hermilo Velasquez is a 89 year old male with a past medical history significant for lung cancer s/p wedge  "resection, recurrent c.diff, afib, chronic obstructive pulmonary disease, pulmonary embolism, aortic stenosis s/p bioprosthetic heart valve, chronic kidney disease admitted on 3/7/2024 for further evaluation of altered mental status.     Patient presents to the ED hemodynamically stable. SpO2 93% on 5 L NC (chronically on 3L O2 baseline). Temperature 98.9. Per EMS and patient's wife, patient's health has been declining over past three months. He experienced a fall 03/04/2024 for which he was seen in the ED and had a negative head CT. Wife states patient sat down in a chair yesterday afternoon (03/06/2024) and essentially hasn't gotten up since. He was noted to be non-verbal upon EMS arrival, although he responded to pain. Upon arrival to [facility] ED, patient open eyes to voice and will respond verbally with \"yes or no.\" Wife notes while patient has had difficulty communicating since his nodule in his mouth has grown larger, he does usually communicate regularly with her at home. Patient is very lethargic upon examination and unable to provide his own history. Labs in the ED generally unremarkable with no leukocytosis. Procalcitonin 0.79 (improved from 21.39 from 02/19 hospital stay). Lactate WNL (1.9). Blood cultures obtained and pending. No significant electrolyte abnormality or worsening of his chronic anemia (hgb 11.1 and baseline 9.0). Glucose 116. . VBGs notable for a pH 7.36, CO2 67, Bicarb 38. UA and urine drug screen obtained and negative for infection. Influenza, COVID and RSV pending. Ketone quantitative <0.18. Urine drug screen negative. CXR demonstrated stable size of cardiac silhouette status post median sternotomy and aortic valve replacement. Slight improvement in aeration in the right lower lobe with decreased conspicuity of airspace disease since prior exam. Persistent small bilateral pleural effusions, right larger than left. No pneumothorax. CT negative for acute intracranial pathology. "     Of note, patient has history of encephalopathy during several past hospital stays, most recently for sepsis secondary to bilateral pneumonia 02/19/2024 - 02/28/2024.     Past Medical History    Past Medical History:   Diagnosis Date    AAA (abdominal aortic aneurysm) (H24)     Adenocarcinoma, lung, 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 Vasques     Aortic stenosis     Severe AS, 9/2015 AVR - [facility] TRIFECTA Bovine bioprosthesis 25MM [id number]    Atrial fibrillation (H)     9/2015 Paroxysmal post op Afib - discharged on Warfarin and a beta blocker    Bullous pemphigoid (H28)     CKD (chronic kidney disease) stage 3, GFR 30-59 ml/min (H)     COPD (chronic obstructive pulmonary disease) (H)     Deep vein thrombosis (H)     GERD (gastroesophageal reflux disease)     Heart murmur     PATIENCE (iron deficiency anemia)     Monoclonal gammopathy     plasmacyte prominent causing monoclonal gammopathy    Need for SBE (subacute bacterial endocarditis) prophylaxis     Neuropathy     Other and unspecified hyperlipidemia     Other malignant lymphomas     non hodgkin's lymphoma    Pulmonary embolism (H)     RBBB (right bundle branch block)     Severe sepsis with acute organ dysfunction (H) 11/16/2015    Unspecified hereditary and idiopathic peripheral neuropathy      Past Surgical History   Past Surgical History:   Procedure Laterality Date    APPENDECTOMY      ARTHROPLASTY KNEE Right 7/25/2022    Procedure: RIGHT TOTAL KNEE ARTHROPLASTY;  Surgeon: Giuliano Deshpande MD;  Location:  OR    AS TOTAL KNEE ARTHROPLASTY      BACK SURGERY      ESOPHAGOSCOPY, GASTROSCOPY, DUODENOSCOPY (EGD), COMBINED N/A 11/28/2015    Procedure: COMBINED ESOPHAGOSCOPY, GASTROSCOPY, DUODENOSCOPY (EGD);  Surgeon: Danis Castillo MD;  Location:  GI    ESOPHAGOSCOPY, GASTROSCOPY, DUODENOSCOPY (EGD), COMBINED N/A 7/26/2018    Procedure: COMBINED ESOPHAGOSCOPY, GASTROSCOPY, DUODENOSCOPY (EGD);;  Surgeon: Toby Dong DO;  Location:   GI    ESOPHAGOSCOPY, GASTROSCOPY, DUODENOSCOPY (EGD), COMBINED N/A 8/17/2020    Procedure: ESOPHAGOGASTRODUODENOSCOPY (EGD);  Surgeon: Herrrea Henry MD;  Location:  GI    ESOPHAGOSCOPY, GASTROSCOPY, DUODENOSCOPY (EGD), COMBINED N/A 10/24/2020    Procedure: ESOPHAGOGASTRODUODENOSCOPY (EGD);  Surgeon: Vick Florentino MD;  Location:  GI    ESOPHAGOSCOPY, GASTROSCOPY, DUODENOSCOPY (EGD), COMBINED N/A 4/11/2022    Procedure: ESOPHAGOGASTRODUODENOSCOPY (EGD);  Surgeon: Vick Florentino MD;  Location:  GI    ESOPHAGOSCOPY, GASTROSCOPY, DUODENOSCOPY (EGD), COMBINED N/A 8/26/2022    Procedure: ESOPHAGOGASTRODUODENOSCOPY (EGD);  Surgeon: El Mcgovern MD;  Location:  GI    HERNIA REPAIR  2006    HERNIORRHAPHY VENTRAL  4/17/2013    Procedure: HERNIORRHAPHY VENTRAL;  VENTRAL HERNIA REPAIR WITH MESH;  Surgeon: Patel Guzman MD;  Location:  OR    KNEE SURGERY      arthroscopic right knee surgery     LOBECTOMY LUNG Right 3/26/2019    POSSIBLE LOBECTOMY LUNG per Dr. Vasques at [facility]    REPAIR LIGAMENT ANKLE  2/23/2012    Procedure:REPAIR LIGAMENT ANKLE; LEFT TARSAL TUNNEL RELEASE OF KNOT OF ARIEL RELEASE; Surgeon:SAUL PUENTE; Location: OR    REPLACE VALVE AORTIC N/A 9/3/2015    Procedure: REPLACE VALVE AORTIC;  Surgeon: Antonino Mitchell MD;  Location:  OR    ROTATOR CUFF REPAIR RT/LT      bilateral    SPINE SURGERY      3 spine surgeries    THORACOTOMY, WEDGE RESECTION LUNG, COMBINED Right 3/26/2019    RIGHT THORACOTOMY, WEDGE RESECTION, RIGHT LOWER LOBE LUNG NODULE,;  Surgeon: Jose A Vasques MD;  Location:  OR    TONSILLECTOMY      TURP       Prior to Admission Medications   Prior to Admission Medications   Prescriptions Last Dose Informant Patient Reported? Taking?   DULoxetine 40 MG CPEP   No No   Sig: Take 40 mg by mouth daily Note dose reduction compared to prior to admission; refills to primary clinic provider   acetaminophen (TYLENOL) 500 MG  tablet  Spouse/Significant Other Yes No   Sig: Take 1,000 mg by mouth 3 times daily as needed   albuterol (PROAIR HFA/PROVENTIL HFA/VENTOLIN HFA) 108 (90 Base) MCG/ACT inhaler  Spouse/Significant Other, Care Giver Yes No   Sig: Inhale 2 puffs into the lungs every 6 hours as needed   atorvastatin (LIPITOR) 10 MG tablet  Spouse/Significant Other, Care Giver No No   Sig: Take 1 tablet (10 mg) by mouth daily   calcium carbonate (TUMS) 500 MG chewable tablet  Spouse/Significant Other Yes No   Sig: Take 1 chew tab by mouth 2 times daily as needed for heartburn   famotidine (PEPCID) 20 MG tablet   No No   Sig: Take 1 tablet (20 mg) by mouth daily Note dose reduction compared to prior to admission; refills to primary clinic provider   ferrous sulfate (FE TABS) 325 (65 Fe) MG EC tablet   No No   Sig: Take 1 tablet (325 mg) by mouth daily   fludrocortisone (FLORINEF) 0.1 MG tablet  Spouse/Significant Other, Care Giver No No   Sig: Take 1 tablet (0.1 mg) by mouth daily   gabapentin (NEURONTIN) 400 MG capsule   No No   Sig: Take 1 capsule (400 mg) by mouth at bedtime Note dose reduction compared to prior to admission; refills to primary clinic provider   ofloxacin (FLOXIN) 0.3 % otic solution  Spouse/Significant Other Yes No   Sig: Place 10 drops Into the left ear 2 times daily Take until supply is gone   ondansetron (ZOFRAN ODT) 4 MG ODT tab  Spouse/Significant Other No No   Sig: Take 1 tablet (4 mg) by mouth every 6 hours as needed for nausea   potassium chloride ER (KLOR-CON M) 20 MEQ CR tablet  Spouse/Significant Other, Care Giver Yes No   Sig: Take 20 mEq by mouth daily   tamsulosin (FLOMAX) 0.4 MG capsule  Spouse/Significant Other, Care Giver No No   Sig: Take 1 capsule (0.4 mg) by mouth daily   tiotropium (SPIRIVA) 18 MCG inhaled capsule  Spouse/Significant Other, Care Giver Yes No   Sig: Inhale 18 mcg into the lungs daily   vancomycin (FIRVANQ) 50 MG/ML oral solution   No No   Sig: Take 2.5 mLs (125 mg) by mouth 2 times  "daily Future dosing, taper, and refills directed to primary clinic provider, review at upcoming visit      Facility-Administered Medications: None        Allergies   Allergies   Allergen Reactions    Omeprazole Itching    Pantoprazole Itching    Prevacid [Lansoprazole] Itching    Lasix [Furosemide] Rash    Lidocaine Blisters and Rash     Allergy to lidocaine ointment  Allergy to lidocaine ointment      Penicillin G Rash    Penicillins Rash     \"broke out from injection\" 60 yrs ago  Tolerates cephalosporins      Physical Exam   Vital Signs: Temp: 98.8  F (37.1  C) Temp src: Oral BP: 129/76 Pulse: 55   Resp: 18 SpO2: 98 % O2 Device: Nasal cannula Oxygen Delivery: 2 LPM  Weight: 123 lbs 0 oz  GENERAL:  Patient somnolent. Cachexic and chronically ill appearing. Awakens to voice momentarily but unable to answer any questions upon my examination.   HEENT: Normocephalic, atraumatic. Mucous membranes dry. No erythema; uvula midline.  Neck supple with no adenopathy.   PULMONOLOGY: Breath sounds normal.   CARDIAC: Tachycardic with regular rhythm.   ABDOMEN: Soft, nontender non distended.   MUSCULOSKELETAL:  Moving x 4 spontaneously. LE edema bilaterally.  NEURO: Unable to assess secondary to somnolence.    Medical Decision Making             Data     "

## 2024-03-07 NOTE — PROGRESS NOTES
RECEIVING UNIT ED HANDOFF REVIEW    ED Nurse Handoff Report was reviewed by: Tessie Milan RN on March 7, 2024 at 5:46 PM

## 2024-03-07 NOTE — ED PROVIDER NOTES
History     Chief Complaint:  Altered Mental Status       HPI   Hermilo Velasquez is a 89 year old male with a history of a-fib, DVT and PE who presents for evaluation of altered mental status. He is not on blood thinners at present. EMS reports Hermilo's health declining over the past three months, noting a fall on 3/4/24 for which he was seen prior. He sat down in a chair 3/6/24 at 1600 and was unable to get up. He was unresponsive at the scene with a heart rate of  and blood sugar being 119 when assessed by EMS. The patient denies any pain at present. Of note, he is on 2L of oxygen at baseline and has an existing wound to his left forearm.    Independent Historian:   EMS provides most of the history.    Review of External Notes:   Reviewed multiple H&P's and discharge summaries from last 6 months.      Medications:    acetaminophen (TYLENOL) 500 MG tablet  albuterol (PROAIR HFA/PROVENTIL HFA/VENTOLIN HFA) 108 (90 Base) MCG/ACT inhaler  atorvastatin (LIPITOR) 10 MG tablet  calcium carbonate (TUMS) 500 MG chewable tablet  DULoxetine 40 MG CPEP  famotidine (PEPCID) 20 MG tablet  ferrous sulfate (FE TABS) 325 (65 Fe) MG EC tablet  fludrocortisone (FLORINEF) 0.1 MG tablet  gabapentin (NEURONTIN) 400 MG capsule  ofloxacin (FLOXIN) 0.3 % otic solution  ondansetron (ZOFRAN ODT) 4 MG ODT tab  potassium chloride ER (KLOR-CON M) 20 MEQ CR tablet  tamsulosin (FLOMAX) 0.4 MG capsule  tiotropium (SPIRIVA) 18 MCG inhaled capsule  vancomycin (FIRVANQ) 50 MG/ML oral solution        Past Medical History:    Past Medical History:   Diagnosis Date    AAA (abdominal aortic aneurysm) (H24)     Adenocarcinoma, lung, 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    Aortic stenosis     Atrial fibrillation (H)     Bullous pemphigoid (H28)     CKD (chronic kidney disease) stage 3, GFR 30-59 ml/min (H)     COPD (chronic obstructive pulmonary disease) (H)     Deep vein thrombosis (H)     GERD (gastroesophageal reflux disease)     Heart murmur     PATIENCE (iron  deficiency anemia)     Monoclonal gammopathy     Need for SBE (subacute bacterial endocarditis) prophylaxis     Neuropathy     Other and unspecified hyperlipidemia     Other malignant lymphomas     Pulmonary embolism (H)     RBBB (right bundle branch block)     Severe sepsis with acute organ dysfunction (H) 11/16/2015    Unspecified hereditary and idiopathic peripheral neuropathy        Past Surgical History:    Past Surgical History:   Procedure Laterality Date    APPENDECTOMY      ARTHROPLASTY KNEE Right 7/25/2022    Procedure: RIGHT TOTAL KNEE ARTHROPLASTY;  Surgeon: Giuliano Deshpande MD;  Location:  OR    AS TOTAL KNEE ARTHROPLASTY      BACK SURGERY      ESOPHAGOSCOPY, GASTROSCOPY, DUODENOSCOPY (EGD), COMBINED N/A 11/28/2015    Procedure: COMBINED ESOPHAGOSCOPY, GASTROSCOPY, DUODENOSCOPY (EGD);  Surgeon: Danis Castillo MD;  Location:  GI    ESOPHAGOSCOPY, GASTROSCOPY, DUODENOSCOPY (EGD), COMBINED N/A 7/26/2018    Procedure: COMBINED ESOPHAGOSCOPY, GASTROSCOPY, DUODENOSCOPY (EGD);;  Surgeon: Toby Dong DO;  Location:  GI    ESOPHAGOSCOPY, GASTROSCOPY, DUODENOSCOPY (EGD), COMBINED N/A 8/17/2020    Procedure: ESOPHAGOGASTRODUODENOSCOPY (EGD);  Surgeon: Herrera Henry MD;  Location:  GI    ESOPHAGOSCOPY, GASTROSCOPY, DUODENOSCOPY (EGD), COMBINED N/A 10/24/2020    Procedure: ESOPHAGOGASTRODUODENOSCOPY (EGD);  Surgeon: Vick Florentino MD;  Location:  GI    ESOPHAGOSCOPY, GASTROSCOPY, DUODENOSCOPY (EGD), COMBINED N/A 4/11/2022    Procedure: ESOPHAGOGASTRODUODENOSCOPY (EGD);  Surgeon: Vick Florentino MD;  Location:  GI    ESOPHAGOSCOPY, GASTROSCOPY, DUODENOSCOPY (EGD), COMBINED N/A 8/26/2022    Procedure: ESOPHAGOGASTRODUODENOSCOPY (EGD);  Surgeon: El Mcgovern MD;  Location:  GI    HERNIA REPAIR  2006    HERNIORRHAPHY VENTRAL  4/17/2013    Procedure: HERNIORRHAPHY VENTRAL;  VENTRAL HERNIA REPAIR WITH MESH;  Surgeon: Patel Guzman MD;  Location:  OR    KNEE  "SURGERY      arthroscopic right knee surgery     LOBECTOMY LUNG Right 3/26/2019    POSSIBLE LOBECTOMY LUNG per Dr. Vasques at [facility]    REPAIR LIGAMENT ANKLE  2/23/2012    Procedure:REPAIR LIGAMENT ANKLE; LEFT TARSAL TUNNEL RELEASE OF KNOT OF ARIEL RELEASE; Surgeon:SAUL PUENTE; Location: OR    REPLACE VALVE AORTIC N/A 9/3/2015    Procedure: REPLACE VALVE AORTIC;  Surgeon: Antonino Mitchell MD;  Location:  OR    ROTATOR CUFF REPAIR RT/LT      bilateral    SPINE SURGERY      3 spine surgeries    THORACOTOMY, WEDGE RESECTION LUNG, COMBINED Right 3/26/2019    RIGHT THORACOTOMY, WEDGE RESECTION, RIGHT LOWER LOBE LUNG NODULE,;  Surgeon: Jose A Vasques MD;  Location:  OR    TONSILLECTOMY      TURP          Physical Exam   Patient Vitals for the past 24 hrs:   BP Temp Temp src Pulse Resp SpO2 Height Weight   03/07/24 1229 127/82 98.9  F (37.2  C) Temporal 103 16 93 % 1.778 m (5' 10\") 55.8 kg (123 lb)        Physical Exam  Nursing note and vitals reviewed.  HENT:   Mouth/Throat: dry mucous membranes.   Eyes: EOMI, nonicteric sclera  Cardiovascular: tachycardic, irregularly irregular rhythm  Pulmonary/Chest: Effort normal and breath sounds normal. No respiratory distress. No wheezes. No rales.   Abdominal: Soft. Nontender, nondistended, no guarding or rigidity.   Musculoskeletal: Normal range of motion.   Neurological: Somnolent, but awakens to voice, answers some questions, and follows simple commands.  Moves all extremities.   Skin: Skin is warm and dry. No rash noted.         Emergency Department Course   ECG  ECG taken at 1225, ECG read at 1241  Atrial fibrillation with premature ventricular or aberrantly conducted complexes  Left axis deviation  Right bundle branch block  Abnormal ECG  No significant changes as compared to prior, dated 3/4/24.  Rate 96 bpm. NM interval * ms. QRS duration 136 ms. QT/QTc 352/444 ms. P-R-T axes * -43 80.           Imaging:  Head CT w/o contrast   Final " Result   IMPRESSION:  Diffuse cerebral volume loss and cerebral white matter   changes consistent with chronic small vessel ischemic disease. No   evidence for acute intracranial pathology.         Radiation dose for this scan was reduced using automated exposure   control, adjustment of the mA and/or kV according to patient size, or   iterative reconstruction technique.      SMITA CHAVEZ MD            SYSTEM ID:  [id number]      XR Chest Port 1 View   Final Result   IMPRESSION: Stable size of cardiac silhouette status post median   sternotomy and aortic valve replacement. Slight improvement in   aeration in the right lower lobe with decreased conspicuity of   airspace disease since prior exam. Persistent small bilateral pleural   effusions, right larger than left. No pneumothorax. Bones are   unchanged.      DAKOTA COVARRUBIAS MD            SYSTEM ID:  [id number]             Laboratory:  Labs Ordered and Resulted from Time of ED Arrival to Time of ED Departure   COMPREHENSIVE METABOLIC PANEL - Abnormal       Result Value    Sodium 140      Potassium 3.8      Carbon Dioxide (CO2) 30 (*)     Anion Gap 12      Urea Nitrogen 13.6      Creatinine 1.09      GFR Estimate 64      Calcium 9.0      Chloride 98      Glucose 116 (*)     Alkaline Phosphatase 89      AST 34      ALT 11      Protein Total 7.2      Albumin 3.0 (*)     Bilirubin Total 0.6     CK TOTAL - Abnormal     (*)    PROCALCITONIN - Abnormal    Procalcitonin 0.79 (*)    CBC WITH PLATELETS AND DIFFERENTIAL - Abnormal    WBC Count 7.1      RBC Count 4.33 (*)     Hemoglobin 11.1 (*)     Hematocrit 36.4 (*)     MCV 84      MCH 25.6 (*)     MCHC 30.5 (*)     RDW 17.6 (*)     Platelet Count 180      % Neutrophils 75      % Lymphocytes 12      % Monocytes 11      % Eosinophils 2      % Basophils 0      % Immature Granulocytes 0      NRBCs per 100 WBC 0      Absolute Neutrophils 5.3      Absolute Lymphocytes 0.8      Absolute Monocytes 0.8      Absolute Eosinophils  0.1      Absolute Basophils 0.0      Absolute Immature Granulocytes 0.0      Absolute NRBCs 0.0     ISTAT GASES LACTATE VENOUS POCT - Abnormal    Lactic Acid POCT 1.9      Bicarbonate Venous POCT 38 (*)     O2 Sat, Venous POCT 23 (*)     pCO2 Venous POCT 67 (*)     pH Venous POCT 7.36      pO2 Venous POCT 18 (*)    ROUTINE UA WITH MICROSCOPIC REFLEX TO CULTURE - Abnormal    Color Urine Yellow      Appearance Urine Clear      Glucose Urine Negative      Bilirubin Urine Negative      Ketones Urine Negative      Specific Gravity Urine 1.027      Blood Urine Negative      pH Urine 5.5      Protein Albumin Urine 20 (*)     Urobilinogen Urine Normal      Nitrite Urine Negative      Leukocyte Esterase Urine Negative      Mucus Urine Present (*)     RBC Urine 0      WBC Urine 3      Squamous Epithelials Urine 1     MAGNESIUM - Normal    Magnesium 2.0     PHOSPHORUS - Normal    Phosphorus 3.6     KETONE BETA-HYDROXYBUTYRATE QUANTITATIVE, RAPID - Normal    Ketone (Beta-Hydroxybutyrate) Quantitative <0.18     INFLUENZA A/B, RSV, & SARS-COV2 PCR - Normal    Influenza A PCR Negative      Influenza B PCR Negative      RSV PCR Negative      SARS CoV2 PCR Negative     URINE DRUG SCREEN PANEL - Normal    Amphetamines Urine Screen Negative      Barbituates Urine Screen Negative      Benzodiazepine Urine Screen Negative      Cannabinoids Urine Screen Negative      Cocaine Urine Screen Negative      Fentanyl Qual Urine Screen Negative      Opiates Urine Screen Negative      PCP Urine Screen Negative     BLOOD CULTURE   BLOOD CULTURE        Procedures   none    Emergency Department Course & Assessments:    Interventions:  Medications   sodium chloride 0.9% BOLUS 1,000 mL (1,000 mLs Intravenous $New Bag 3/7/24 1302)        Independent Interpretation (X-rays, CTs, rhythm strip):  I independently reviewed the CXR.  Compared to prior x-ray, right lower lobe opacities are improving.      Assessments/Consultations/Discussion of Management or  Tests:  ED Course as of 03/07/24 1450   Thu Mar 07, 2024   1230 I saw this patient for an initial evaluation and exam.   1333 I rechecked and updated the patient.     1448 I spoke with Taylor for Dr. Diaz from [facility] about the patient's presentation, findings, and plan of care.         Social Determinants of Health affecting care:   Healthcare Access/Compliance    Disposition:  The patient was admitted to the hospital under the care of Dr. Diaz.     Impression & Plan        Medical Decision Making:  Patient presents via EMS with chief complaint unresponsiveness.  EMS report was actually that patient was only withdrawing to pain and was otherwise unresponsive.  Reportedly, he had been sitting in the same chair for about the last 20 hours.  On arrival, patient is moving, talking, and following commands.  Estimated his GCS to be about 12-13.  Broad differential was of course considered including sepsis, CVA, intracranial hemorrhage, dehydration, rhabdomyolysis, electrolyte derangement, substance abuse, among many other etiologies.  Fortunately, no emergent pathology is evident.  CT head unremarkable.  Blood work is not suggestive of sepsis and chest x-ray appears improved compared to last admission.  On reevaluation, patient still somnolent but still awakens and follows some commands.  I do not believe antibiotics are indicated at this time.  Will plan on hospitalization for rehydration and ongoing monitoring.  Case discussed with HEMAL Vazquez who accepts pt for admission.       Diagnosis:    ICD-10-CM    1. Altered mental status, unspecified altered mental status type  R41.82       2. Adult failure to thrive  R62.7              Scribe Disclosure:  I, Darion Eh, am serving as a scribe at 12:30 PM on 3/7/2024 to document services personally performed by Barrie Otero MD based on my observations and the provider's statements to me.        Barrie Otero MD  03/07/24 9028

## 2024-03-07 NOTE — ED NOTES
"[facility]  ED Nurse Handoff Report    ED Chief complaint: Altered Mental Status      ED Diagnosis:   Final diagnoses:   Altered mental status, unspecified altered mental status type   Adult failure to thrive       Code Status: Full Code    Allergies:   Allergies   Allergen Reactions    Omeprazole Itching    Pantoprazole Itching    Prevacid [Lansoprazole] Itching    Lasix [Furosemide] Rash    Lidocaine Blisters and Rash     Allergy to lidocaine ointment  Allergy to lidocaine ointment      Penicillin G Rash    Penicillins Rash     \"broke out from injection\" 60 yrs ago  Tolerates cephalosporins       Patient Story: BIBA difficult to arouse since 1600 yesterday. Recent hospital admission. Withdrawals to pain. Minimally arousable in ER  Focused Assessment:  Arouses to painful stimulus. O2 at baseline. VSS.     Treatments and/or interventions provided: IVF, XR, CT, labs, ua      Patient's response to treatments and/or interventions:   Abnormal Labs Resulted from Time of ED Arrival to Time of ED Departure   COMPREHENSIVE METABOLIC PANEL - Abnormal       Result Value    Sodium 140      Potassium 3.8      Carbon Dioxide (CO2) 30 (*)     Anion Gap 12      Urea Nitrogen 13.6      Creatinine 1.09      GFR Estimate 64      Calcium 9.0      Chloride 98      Glucose 116 (*)     Alkaline Phosphatase 89      AST 34      ALT 11      Protein Total 7.2      Albumin 3.0 (*)     Bilirubin Total 0.6     CK TOTAL - Abnormal     (*)    PROCALCITONIN - Abnormal    Procalcitonin 0.79 (*)    CBC WITH PLATELETS AND DIFFERENTIAL - Abnormal    WBC Count 7.1      RBC Count 4.33 (*)     Hemoglobin 11.1 (*)     Hematocrit 36.4 (*)     MCV 84      MCH 25.6 (*)     MCHC 30.5 (*)     RDW 17.6 (*)     Platelet Count 180      % Neutrophils 75      % Lymphocytes 12      % Monocytes 11      % Eosinophils 2      % Basophils 0      % Immature Granulocytes 0      NRBCs per 100 WBC 0      Absolute Neutrophils 5.3      Absolute " Lymphocytes 0.8      Absolute Monocytes 0.8      Absolute Eosinophils 0.1      Absolute Basophils 0.0      Absolute Immature Granulocytes 0.0      Absolute NRBCs 0.0     ISTAT GASES LACTATE VENOUS POCT - Abnormal    Lactic Acid POCT 1.9      Bicarbonate Venous POCT 38 (*)     O2 Sat, Venous POCT 23 (*)     pCO2 Venous POCT 67 (*)     pH Venous POCT 7.36      pO2 Venous POCT 18 (*)      Head CT w/o contrast   Final Result   IMPRESSION:  Diffuse cerebral volume loss and cerebral white matter   changes consistent with chronic small vessel ischemic disease. No   evidence for acute intracranial pathology.         Radiation dose for this scan was reduced using automated exposure   control, adjustment of the mA and/or kV according to patient size, or   iterative reconstruction technique.      SMITA CHAVEZ MD            SYSTEM ID:  [id number]      XR Chest Port 1 View   Final Result   IMPRESSION: Stable size of cardiac silhouette status post median   sternotomy and aortic valve replacement. Slight improvement in   aeration in the right lower lobe with decreased conspicuity of   airspace disease since prior exam. Persistent small bilateral pleural   effusions, right larger than left. No pneumothorax. Bones are   unchanged.      DAKOTA COVARRUBIAS MD            SYSTEM ID:  [id number]            To be done/followed up on inpatient unit:      Does this patient have any cognitive concerns?: Baseline dementia    Activity level - Baseline/Home:  Stand with Assist  Activity Level - Current:   Stand with assist x2    Patient's Preferred language: English   Needed?: No    Isolation: None  Infection: Not Applicable  Patient tested for COVID 19 prior to admission: YES  Bariatric?: No    Vital Signs:   Vitals:    03/07/24 1400 03/07/24 1415 03/07/24 1430 03/07/24 1445   BP: 128/84 (!) 122/96 126/89 (!) 123/95   Pulse: 85 83 99 98   Resp: 17 21 15 13   Temp:       TempSrc:       SpO2: 100% 100% 99% 98%   Weight:       Height:            Cardiac Rhythm:     Was the PSS-3 completed:   No -unable to complete  What interventions are required if any?               Family Comments: Roberta at bedside    For the majority of the shift this patient's behavior was Yellow.   Behavioral interventions performed were none.    ED NURSE PHONE NUMBER:

## 2024-03-07 NOTE — TELEPHONE ENCOUNTER
Jaycob, RN with [facility], calling to report patient is currently asleep and per spouse, has been asleep since 4 pm yesterday. Jaycob reports patient is difficult to arouse.   Patient is currently on 3L O2 via [place] (baseline), SpO2 is 100%. Jaycob wonders if patient is being over-oxygenated.       Writer advised Jaycob RN to call 911 now, Jaycob agrees.     ZAHRA - Jaycob states he will also place recommendation for patient to reside in assisted living upon hospital discharge as he doesn't feel the patient's wife can manage patient's care at home.   Reason for Disposition   Coma (e.g., unconscious, not responding to verbal or painful stimulus)    Additional Information   Negative: CARDIAC ARREST suspected   Negative: Breathing stopped   Negative: Anaphylactic reaction (life-threatening allergic reaction)   Negative: Asthma attack, severe (e.g., struggling for each breath, unable to speak)   Negative: Bleeding (severe) from skin AND can't be stopped   Negative: Bleeding (severe) from nose, mouth, vomiting, anus, vagina AND can't be stopped   Negative: Bluish (or gray) lips or face now   Negative: Breathing difficulty, severe (e.g., struggling for each breath, unable to speak)   Negative: Burn, severe (e.g., burn area larger than 20 palms of hand [>10% BSA] with blisters)   Negative: Choking, severe (e.g., unable to breathe, talk)    Protocols used: 917 Symptoms-A-OH

## 2024-03-07 NOTE — ED NOTES
"[facility]  ED Nurse Handoff Report    ED Chief complaint: Altered Mental Status      ED Diagnosis:   Final diagnoses:   Altered mental status, unspecified altered mental status type   Adult failure to thrive       Code Status: Full Code    Allergies:   Allergies   Allergen Reactions    Omeprazole Itching    Pantoprazole Itching    Prevacid [Lansoprazole] Itching    Lasix [Furosemide] Rash    Lidocaine Blisters and Rash     Allergy to lidocaine ointment  Allergy to lidocaine ointment      Penicillin G Rash    Penicillins Rash     \"broke out from injection\" 60 yrs ago  Tolerates cephalosporins       Patient Story: Pt lives at home w spouse. Home health RN was out to house and spouse reported had been unable to wake spouse up.  Pt had been in chair since 1600 yesterday.  Normally on 3L O2.  Oxygen was at 100% to home health nurse.  Upon medics arrival, reported only withdrawing to pain.   Focused Assessment:  Pt aroused to voice upon arrival.  CT head neg.  No reports of new falls/trauma.  CXR improved from last visit.  Lactic negative. Procal slightly elevated.  No cause identified for somnolence.  GCS 13.  Occ responds yes/no to questions.  Spouse at bedside.   Awaiting COVID and UA results.    Treatments and/or interventions provided: IVF  Patient's response to treatments and/or interventions: some improvement     To be done/followed up on inpatient unit:  n/a    Does this patient have any cognitive concerns?: Disoriented x3    Activity level - Baseline/Home:  Unknown  Activity Level - Current:   Unknown    Patient's Preferred language: English   Needed?: No    Isolation: None  Infection: Not Applicable  Patient tested for COVID 19 prior to admission: YES  Bariatric?: No    Vital Signs:   Vitals:    03/07/24 1400 03/07/24 1415 03/07/24 1430 03/07/24 1445   BP: 128/84 (!) 122/96 126/89 (!) 123/95   Pulse: 85 83 99 98   Resp: 17 21 15 13   Temp:       TempSrc:       SpO2: 100% 100% 99% 98% "   Weight:       Height:           Cardiac Rhythm:Cardiac Rhythm: Atrial fibrillation    Was the PSS-3 completed:   No nonresponsive to questions  What interventions are required if any?               Family Comments: spouse @ bedside  OBS brochure/video discussed/provided to patient/family: N/A              Name of person given brochure if not patient:               Relationship to patient:     For the majority of the shift this patient's behavior was Green.   Behavioral interventions performed were continue to monitor - has hx of agitation .    ED NURSE PHONE NUMBER: *[phone]

## 2024-03-07 NOTE — ED TRIAGE NOTES
"BIB EMS for being \"asleep\" since 4PM yesterday. Hx a fib, no thinners.  Recently seen 3/4 for a fall and had negative head CT.    Withdrawals to pain from EMS and was non verbal.  On arrival patient opens eyes to voice.  Responds yes/no.  .  Spontaneous twitching.  On 2L O2 baseline.     "

## 2024-03-08 NOTE — PHARMACY-ADMISSION MEDICATION HISTORY
"Pharmacist Admission Medication History    Admission medication history is complete. The information provided in this note is only as accurate as the sources available at the time of the update.    Information Source(s): Family member, Caregiver, Hospital records, and CareEverywhere/SureScripts via phone  Discharged 2/28, current list reflects discharge summary medication list  Called [facility] - they saw patient in home 3/7/24 prior to presentation to ED. Staff verified current medication list below. Overall, spouse is in charge of giving patient medications at home.   Writer completed previous medication history for patient 2/21/24 - see note for details at that point. Clarified medications to best of ability. Called spouse who was unable to find home med list but states there have been no changes since discharge 2/28/24.     Pertinent Information:   --Staff at [facility] notably said patient/spouse refuse duloxetine due to concern for side effects.   --albuterol and tums are not on [facility] list (read off via phone) but spouse confirms patient taking PRN.   --spouse able to confirm patient is still taking eye drops and vancomycin PO solution.   --last doses are best estimate; spouse did not clarify which medications are taken AM vs PM but states patient last got doses at home AM 3/6/24.     Spouse requested [facility]. Also has \"lots\" of home supply of vancomycin solution and does not need additional supply at this time if taper remains on discharge.     Changes made to PTA medication list:  Added: None  Deleted: (addendum) - marked duloxetine not taking  Changed: None    Allergies reviewed with patient and updates made in EHR: no, spouse states no changes since last admission     Medication History Completed By: Louisa Parnell [facility] 3/8/2024 8:22 AM    PTA Med List   Medication Sig Last Dose    acetaminophen (TYLENOL) 500 MG tablet Take 1,000 mg by mouth 3 times " daily as needed Unknown at PRN    albuterol (PROAIR HFA/PROVENTIL HFA/VENTOLIN HFA) 108 (90 Base) MCG/ACT inhaler Inhale 2 puffs into the lungs every 6 hours as needed Unknown at PRN    atorvastatin (LIPITOR) 10 MG tablet Take 1 tablet (10 mg) by mouth daily Past Week at unknown time    calcium carbonate (TUMS) 500 MG chewable tablet Take 1 chew tab by mouth 2 times daily as needed for heartburn Unknown at PRN    DULoxetine 40 MG CPEP Take 40 mg by mouth daily Note dose reduction compared to prior to admission; refills to primary clinic provider Unknown at unknown time    famotidine (PEPCID) 20 MG tablet Take 1 tablet (20 mg) by mouth daily Note dose reduction compared to prior to admission; refills to primary clinic provider Past Week at unknown time    ferrous sulfate (FE TABS) 325 (65 Fe) MG EC tablet Take 1 tablet (325 mg) by mouth daily Past Week at unknown time    fludrocortisone (FLORINEF) 0.1 MG tablet Take 1 tablet (0.1 mg) by mouth daily Past Week at unknown time    gabapentin (NEURONTIN) 400 MG capsule Take 1 capsule (400 mg) by mouth at bedtime Note dose reduction compared to prior to admission; refills to primary clinic provider 3/5/2024 at HS    ofloxacin (FLOXIN) 0.3 % otic solution Place 10 drops Into the left ear 2 times daily Take until supply is gone Past Week at unknown time    ondansetron (ZOFRAN ODT) 4 MG ODT tab Take 1 tablet (4 mg) by mouth every 6 hours as needed for nausea Unknown at PRN    potassium chloride ER (KLOR-CON M) 20 MEQ CR tablet Take 20 mEq by mouth daily Past Week at unknown time    tamsulosin (FLOMAX) 0.4 MG capsule Take 1 capsule (0.4 mg) by mouth daily Past Week at unknown time    tiotropium (SPIRIVA) 18 MCG inhaled capsule Inhale 18 mcg into the lungs daily Past Week at unknown time    vancomycin (FIRVANQ) 50 MG/ML oral solution Take 2.5 mLs (125 mg) by mouth 2 times daily Future dosing, taper, and refills directed to primary clinic provider, review at upcoming visit  3/6/2024 at AM

## 2024-03-08 NOTE — PROGRESS NOTES
Admission    Patient arrives to room 622 via cart from ED.    Care plan note: Pt arrived not opening eyes. Moving extremities independently. Did not speak. VSS on 2L, HR mildly tachy. Significant wounds throughout whole body. 2 skin tears to L arm, large coccyx wound, garcia area macerated and heels red barely blanchable. Cleaned and dressed all wounds. +2-3 pitting edema to BLE. PIV infiltrated, pt is a very difficult IV start. Resource tired x1 without success. Consulted Vascular access. PCDs on, turned on L side.

## 2024-03-08 NOTE — PLAN OF CARE
Goal Outcome Evaluation:       Cognitive Concerns/ Orientation : Disoriented to time/place and situation   BEHAVIOR & AGGRESSION TOOL COLOR: Green, can be resistive with cares at times  CIWA SCORE: NA   ABNL VS/O2: HR tachy, other VSS on 2 L NC. Uses 3 L baseline.   MOBILITY: Not OOB this shift, T/R, patient moves in bed, needs prompting to change position.  PAIN MANAGMENT: denies  DIET: Mechanical/Dental Soft Diet; Slightly Thick (level 1)  BOWEL/BLADDER: No BM this shift, External urinary cath in place with minimal jp urine output  ABNL LAB/BG: see results  DRAIN/DEVICES: L PIV infusing NS @ 50 ml/hr, male external catheter  TELEMETRY RHYTHM: Afib RVR with PVC  SKIN: Scattered bruises, skin tears on L arm and hand, existing non blanchable redness and wound on sacrum, coccyx., thin and frail skin. +2 LE edema (elevated on Pillows)  TESTS/PROCEDURES: none this shift  D/C DAY/GOALS/PLACE: pending  OTHER IMPORTANT INFO: LUIS,  consulted,

## 2024-03-08 NOTE — UTILIZATION REVIEW
Admission Status; Secondary Review Determination       Under the authority of the Utilization Management Committee, the utilization review process indicated a secondary review on the above patient. The review outcome is based on review of the medical records, discussions with staff, and applying clinical experience noted on the date of the review.     (x) Inpatient Status Appropriate - This patient's medical care is consistent with medical management for inpatient care and reasonable inpatient medical practice.     RATIONALE FOR DETERMINATION     Hermilo Velasquez is a 89 year old male with history of lung cancer s/p wedge resection, recurrent c.diff, afib, chronic obstructive pulmonary disease, pulmonary embolism, aortic stenosis s/p bioprosthetic heart valve, and chronic kidney disease. He presented to the ED on 3/7/2024 for evaluation of altered mental status.      ED evaluation showed SpO2 93% on 5 L NC (chronically on 3L O2 baseline). Temperature 98.9. Per EMS and patient's wife, patient's health has been declining over past three months. He experienced a fall 03/04/2024 for which he was seen in the ED and had a negative head CT. Wife states patient sat down in a chair the afternoon before presentation (03/06/2024) and did not get up after.  In the ED he continued to have decreased level of consciousness.      ED evaluation included VBG which was notable for a pH 7.36, CO2 67, and Bicarb 38. CXR demonstrated stable size of cardiac silhouette status post median sternotomy and aortic valve replacement. Slight improvement in aeration in the right lower lobe with decreased conspicuity of airspace disease since prior exam. Persistent small bilateral pleural effusions, right larger than left. No pneumothorax. CT head was negative for acute intracranial pathology.  He was admitted with altered mental status and acute on chronic hypoxia.  Mental status is still depressed.  Palliative care is being consulted for care  planning.  Inpatient admission is appropriate for ongoing evaluation and treatment of altered mental status and for care planning for this patient with lung cancer and generally declining condition.         At the time of admission with the information available to the attending physician more than 2 nights Hospital complex care was anticipated, based on patient risk of adverse outcome if treated as outpatient and complex care required. Inpatient admission is appropriate based on the Medicare guidelines.     This document was produced using voice recognition software       The information on this document is developed by the utilization review team in order for the business office to ensure compliance. This only denotes the appropriateness of proper admission status and does not reflect the quality of care rendered.   The definitions of Inpatient Status and Observation Status used in making the determination above are those provided in the CMS Coverage Manual, Chapter 1 and Chapter 6, section 70.4.   Sincerely,     Charles Park MD    Utilization Review  Physician Advisor  [facility].

## 2024-03-08 NOTE — PROGRESS NOTES
[facility]    Patient is currently receiving services with [facility]. The patient is currently receiving RN services.  Patient's  and home health team have been notified that patient is under inpatient status [facility] Liaison will continue to follow patient during stay. Please provide orders to resume home care at time of discharge if appropriate.    Addendum: HC Liaison heard from patient's home care , there are concerns about spouse's ability to safely manage patient's cares at home. HC team feels patient would benefit from higher level of care or additional help at home.

## 2024-03-08 NOTE — PROGRESS NOTES
"[facility]    Medicine Progress Note - Hospitalist Service    Date of Admission:  3/7/2024  Date of Service: 03/08/2024    Assessment & Plan      Hermilo Velasquez is a 89 year old male with a past medical history significant for lung cancer s/p wedge resection, recurrent c.diff, afib, chronic obstructive pulmonary disease, pulmonary embolism, aortic stenosis s/p bioprosthetic heart valve, chronic kidney disease admitted on 3/7/2024 for further evaluation of altered mental status.     Patient presents to the ED hemodynamically stable. SpO2 93% on 5 L NC (chronically on 3L O2 baseline). Temperature 98.9. Per EMS and patient's wife, patient's health has been declining over past three months. He experienced a fall 03/04/2024 for which he was seen in the ED and had a negative head CT. Wife states patient sat down in a chair yesterday afternoon (03/06/2024) and essentially hasn't gotten up since. He was noted to be non-verbal upon EMS arrival, although he responded to pain. Upon arrival to [facility] ED, patient open eyes to voice and will respond verbally with \"yes or no.\" Wife notes while patient has had difficulty communicating since his nodule in his mouth has grown larger, he does usually communicate regularly with her at home. Patient is very lethargic upon examination and unable to provide his own history. Labs in the ED generally unremarkable with no leukocytosis. Procalcitonin 0.79 (improved from 21.39 from 02/19 hospital stay). Lactate WNL (1.9). Blood cultures obtained and pending. No significant electrolyte abnormality or worsening of his chronic anemia (hgb 11.1 and baseline 9.0). Glucose 116. . VBGs notable for a pH 7.36, CO2 67, Bicarb 38. UA and urine drug screen obtained and negative for infection. Influenza, COVID and RSV pending. Ketone quantitative <0.18. Urine drug screen negative. CXR demonstrated stable size of cardiac silhouette status post median sternotomy and " aortic valve replacement. Slight improvement in aeration in the right lower lobe with decreased conspicuity of airspace disease since prior exam. Persistent small bilateral pleural effusions, right larger than left. No pneumothorax. CT negative for acute intracranial pathology.     Of note, patient has history of encephalopathy during several past hospital stays, most recently for sepsis secondary to bilateral pneumonia 02/19/2024 - 02/28/2024.     Failure to thrive  Goals of care  *Overall, this is patient's 7th admission since 08/2023 with continued ongoing decline at home. TCU has been recommended multiple times and at every discharge, however, patient continues to decline. Palliative care has been included in past hospitalizations to discuss goals of care. Patient wants restorative care including full code. Today, wife stating she is unable to manage patient at home and is agreeable to TCU, should patient be appropriate for TCU placement.   Plan:  - Palliative care consult again requested, appreciate the cares.  - Attempted to call patient's son Mikal twice this afternoon for further discussion on goals of care although unable to reach him.   - PT/OT consulted, appreciate the cares.   - CM/SW consulted, appreciate the cares.     Acute encephalopathy   Suspect dementia with occasional delirium and agitation   *By report, unclear how far off baseline mental status. Patient has history of encephalopathy during several past hospital stays, most recently for sepsis secondary to bilateral pneumonia 02/19/2024 - 02/28/2024.   *CT head upon admission negative for acute intracranial pathology. Brain MRI obtained 01/2024 and demonstrated no acute intracranial process. Mild dural thickening and enhancement along the left lateral frontal lobe, nonspecific, but likely unchanged compared to prior brain MRI 06/28/2023. Generalized brain atrophy and presumed microvascular ischemic changes as detailed above.  - Continue  "gabapentin and cymbalta once med rec completed.   - Delirium prevention:   --Reorient patient at each interaction.  --Give patient window bed if possible.  --Keep room lights on and blinds open during day (8am-8pm), low lights 8pm-8am.  --Minimize interruptions of sleep at night (consolidate vitals, nursing assessments, medications).  - PRN PO Seroquel available and IV/IM.  - Continue to monitor with morning labs (CBC, CMP, lactate, VBG).     Acute on chronic hypoxic respiratory failure on 3L O2 at home  Chronic obstructive pulmonary disease (COPD)  Recent hospitalization fro sepsis secondary to bilateral pneumonia   *No significant wheezing recently, low suspicion of acute COPD exacerbation at this time. Requiring 5L O2 upon arrival.   Plan:  - Spo2 goal > 88%.   - Wean from 5 L oxygen as able ; on chronic oxygen prior to admission 3L NC, history of chronic obstructive pulmonary disease (COPD).  - Continue PTA inhalers once med rec completed.   - Wean O2 as above, encourage spirometry.  - Consider steroid 03/08 if minimal improvement overnight.     FDG avid soft tissue nodule in the right tongue base, needs follow-up   *Concern for squamous cell carcinoma; also concern if causing aspiration and PNA.  - Appointment with ENT on 1/11 was cancelled to work up the lung first.  - Wife planned to make appointment with ENT several months ago.   - ENT  2/23/24: \"Interval PET imaging with diagnostic CT neck with contrast for further characterization would be necessary and recommended before considering biopsy. Ultimately, direct laryngoscopy with biopsy in the operating room would be necessary to obtain tissue diagnosis from this site. Would not recommend any further imaging or surgical intervention until determination from medical oncology appropriateness for any chemotherapeutic management. Findings in the base of tongue would not be treated surgically at this time. \"    - Follow-up with primary clinic provider upon " discharge.     Dysphagia, modified diet  Suspect secondary to soft tissue nodule noted above   - Continue dysphagia diet.  - Speech consulted in hospital last visit; recommended slightly thick diet.   - Encourage oral intake.  - Ensure enlive between meals.     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 Planned for pet scan and biopsy of nodule on 1/9 was cancelled by the patient.  - Cytology on pleural fluid 2/20, negative for malignancy; acute inflammation noted.  - Outpatient follow-up with oncology, primary clinic provider to review/arrange.    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     Hx of VTE/pulmonary embolism (PE)   *History of subsegmental pulmonary embolism (PE) on 11/25/23  - Not on anticoagulation due to GI bleed. Hx of IVC filter in the past, not clear if it was removed.  - Follow-up with primary clinic provider upon discharge.     Paroxysmal atrial fibrillation  *Not on anticoagulation due to anemia thought to be due to a GI bleed. Has history of angioextasia and diverticulosis.   - Telemetry.     Anemia, normocytic  Thrombocytopenia, resolved  *No worsening of patient's chronic anemia. Hgb 11.1 and baseline 9.0.   - Continue to monitor with morning labs.     Severe aortic stenosis s/p bioprosthetic aortic valve  Hypertension  Hyperlipidemia  Elevated BNP  Chronically elevated troponin  Hx of orthostatic hypotension  - Telemetry in hospital.  - Transthoracic echocardiogram (TTE) 2/21/24, limited study, see report.  - Continue prior to admission fludrocortisone for history of hypotension.   - Monitor, follow-up with primary clinic provider and cardiology upon discharge.      Benign prostatic hypertrophy (BPH)  - Continue PTA tamsulosin (Flomax) once med rec complete.   - Monitor for urinary retention, bladder scan PRN.  "     History of GI bleed  Gastroesophageal reflux disease (GERD)  Anemia  - Continue PTA famotidine, allergic to PPI.  - Monitor hemoglobin as above.    Chronic kidney disease (CKD) stage 2  *Baseline creatinine around 0.8, 1.09 on admission.   - Start maintenance IV fluids; suspect due to dehydration and limited PO intake; decreased from 100 mL/hr to 50 mL/hr.   - Encourage PO intake.   - Avoid nephrotoxic agents as able.     Depression/Anxiety/Pain  - PTA Duloxetine and gabapentin doses reduced per family request last hospitalization. Continue reduced doses once med rec complete.      Deep Tissue Pressure Injury to sacrum and bilateral buttocks  - WOC consulted, appreciate the cares.      Weakness and deconditioning   - Physical therapy (PT), CM/SW consulted, appreciate the cares. Patient's wife believe's patient would now be served better in TCU versus home as she is unable to care for patient independently. Last hospitalization, however, patient refused repeatedly.           Diet: Combination Diet Regular Diet Adult; Mechanical/Dental Soft Diet; Slightly Thick (level 1)  Snacks/Supplements Adult: Ensure Enlive; Between Meals    DVT Prophylaxis: Pneumatic Compression Devices  Suazo Catheter: Not present  Lines: None     Cardiac Monitoring: ACTIVE order. Indication: Atrial Fibrillation  Code Status: Full Code      Clinically Significant Risk Factors Present on Admission              # Hypoalbuminemia: Lowest albumin = 3 g/dL at 3/7/2024 12:56 PM, will monitor as appropriate     # Hypertension: Noted on problem list  # Chronic heart failure with preserved ejection fraction: heart failure noted on problem list and last echo with EF >50%     # Cachexia: Estimated body mass index is 17.94 kg/m  as calculated from the following:    Height as of this encounter: 1.778 m (5' 10\").    Weight as of this encounter: 56.7 kg (125 lb).       # Financial/Environmental Concerns:    # COPD: noted on problem list        Disposition " Plan      Expected Discharge Date: 03/11/2024                    Quintin Fajardo MD  Hospitalist Service  [facility]  Securely message with V[employer] (more info)  Text page via [employer] Paging/Directory   ______________________________________________________________________    Interval History     No acute events overnight  No CP/SOB  No fevers  O2 needs stable  RN has concerns of possible aspiration  No new complaints from patient    Physical Exam   Vital Signs: Temp: 97.6  F (36.4  C) Temp src: Axillary BP: 95/55 Pulse: (!) 122   Resp: 18 SpO2: 94 % O2 Device: Nasal cannula Oxygen Delivery: 2 LPM  Weight: 125 lbs .01 oz    GENERAL: no apparent distress  HEENT: Normocephalic, atraumatic.  PULMONOLOGY: CTABL  CARDIAC: Tachycardic with regular rhythm.   ABDOMEN: Soft, nontender non distended.   MUSCULOSKELETAL:  Moving x 4 spontaneously. LE edema bilaterally.  NEURO: Blanca. Alert and oriented to person    ----------------------------------------------------------------------------------------    Medical Decision Making       42 MINUTES SPENT BY ME on the date of service doing chart review, history, exam, documentation & further activities per the note.      Data   ------------------------- PAST 24 HR DATA REVIEWED -----------------------------------------------    I have personally reviewed the following data over the past 24 hrs:    12.7 (H)  \   11.0 (L)   / 145 (L)     144 103 12.5 /  105 (H)   3.9 30 (H) 0.97 \     Procal: N/A CRP: N/A Lactic Acid: 1.5         Imaging results reviewed over the past 24 hrs:   No results found for this or any previous visit (from the past 24 hour(s)).  ------------------------- ENCOUNTER LABS ----------------------------------------------------------------  Recent Labs   Lab 03/08/24  1107 03/07/24  1256 03/04/24  1317   WBC 12.7* 7.1 6.6   HGB 11.0* 11.1* 10.4*   MCV 84 84 84   * 180 159    140 139   POTASSIUM 3.9 3.8 3.5   CHLORIDE 103 98 100   CO2  30* 30* 29   BUN 12.5 13.6 12.9   CR 0.97 1.09 0.84   ANIONGAP 11 12 10   AMRITA 8.4 9.0 8.6   * 116* 100*   ALBUMIN  --  3.0*  --    PROTTOTAL  --  7.2  --    BILITOTAL  --  0.6  --    ALKPHOS  --  89  --    ALT  --  11  --    AST  --  34  --        Most Recent 3 CBC's:  Recent Labs   Lab Test 03/08/24  1107 03/07/24  1256 03/04/24  1317   WBC 12.7* 7.1 6.6   HGB 11.0* 11.1* 10.4*   MCV 84 84 84   * 180 159     Most Recent 3 BMP's:  Recent Labs   Lab Test 03/08/24  1107 03/07/24  1256 03/04/24  1317    140 139   POTASSIUM 3.9 3.8 3.5   CHLORIDE 103 98 100   CO2 30* 30* 29   BUN 12.5 13.6 12.9   CR 0.97 1.09 0.84   ANIONGAP 11 12 10   AMRITA 8.4 9.0 8.6   * 116* 100*     Most Recent 2 LFT's:  Recent Labs   Lab Test 03/07/24  1256 02/19/24  2054   AST 34 17   ALT 11 7   ALKPHOS 89 93   BILITOTAL 0.6 0.9     Most Recent 3 INR's:  Recent Labs   Lab Test 02/19/24  2105 01/08/24  1755 09/12/23  1628   INR 1.37* 1.53* 1.15     Most Recent 3 Troponin's:  Recent Labs   Lab Test 04/14/21  0506 04/14/21  0039 04/13/21  2038   TROPI 0.513* 0.529* 0.331*     Most Recent 3 BNP's:  Recent Labs   Lab Test 02/19/24  2054 01/15/24  1855 11/25/23  2301 04/14/21  0506 08/28/20  1145 08/11/20  1247   NTBNPI 30,280* 24,255* 6,480*   < >  --   --    NTBNP  --   --   --   --  1,233* 1,593*    < > = values in this interval not displayed.     Most Recent D-dimer:  Recent Labs   Lab Test 03/30/20  1500   DD 4.6*     Most Recent Cholesterol Panel:  Recent Labs   Lab Test 11/30/21  1123   CHOL 138   LDL 71   HDL 55   TRIG 58     Most Recent 6 Bacteria Isolates From Any Culture (See EPIC Reports for Culture Details):  Recent Labs   Lab Test 04/14/21  0815 04/13/21 2054 04/13/21  2038 09/13/20  1329 09/13/20  1256 08/15/20  1451   CULT Canceled, Test credited  >10 Squamous epithelial cells/low power field indicates oral contamination. Please   recollect.  *  Notification of test cancellation was given to  Elva Ospina RN  1139 4/14/20 by AM   No growth No growth No growth No growth No growth  No growth     Most Recent Urinalysis:  Recent Labs   Lab Test 03/07/24  1437 08/17/23  0213 11/23/22  1541   COLOR Yellow   < > Yellow   APPEARANCE Clear   < > Clear   URINEGLC Negative   < > Negative   URINEBILI Negative   < > Negative   URINEKETONE Negative   < > Negative   SG 1.027   < > 1.020   UBLD Negative   < > Negative   URINEPH 5.5   < > 5.5   PROTEIN 20*   < > Trace*   UROBILINOGEN  --   --  0.2   NITRITE Negative   < > Negative   LEUKEST Negative   < > Negative   RBCU 0   < > 0-2   WBCU 3   < > 0-5    < > = values in this interval not displayed.     Most Recent ESR & CRP:  Recent Labs   Lab Test 10/06/22  1526 05/16/19  0641   SED 43* 89*   CRP  --  127.0*   CRPI 11.40*  --

## 2024-03-08 NOTE — DISCHARGE INSTRUCTIONS
"Sacrum wound: Daily   1. Clean wound with saline or MicroKlenz Spray, pat dry  2. Wipe / \"clean\" the surrounding periwound tissue with skin prep (Cavilon No Sting Skin Prep #189706) and allow to dry. This will help protect periwound and help dressing adherence  3. Apply Aquacel Ag to wound bed.  4. Press a Mepilex Sacral to the area, making sure to conform nicely to skin curvatures.   5. Time and date dressing change    Left elbow wound: Q5D  Cleanse with wound cleanser. Pat dry.   Cover with oil emulsion gauze.  Secure with silicone foam dressing or dry gauze and kerlix  Time and date    Left hand wound: Every other day  Moisten silver alginate with saline for easy removal  Cleanse with wound cleanser. Pat dry.  Lightly moisten Hydrofera blue with saline and apply (364691) to wound bed.  Cover with optifoam gentle with bordered dressing   Time and date.    [facility] with [facility] [phone]    "

## 2024-03-08 NOTE — CONSULTS
[facility] Nurse Inpatient Assessment     Consulted for: Sacrum and Left arm skin tears    Summary: Deep tissue pressure injury present on admission to sacrum. Left arm skin tears present on admission    Patient History (according to provider note(s):      89 year old male with a past medical history significant for lung cancer s/p wedge resection, recurrent c.diff, afib, chronic obstructive pulmonary disease, pulmonary embolism, aortic stenosis s/p bioprosthetic heart valve, chronic kidney disease admitted on 3/7/2024 for further evaluation of altered mental status     Assessment:      Areas visualized during today's visit: Sacrum/coccyx and left arm    Pressure Injury Location: Sacrum    Last photo: 3/8/24    Wound type: Pressure Injury     Pressure Injury Stage: Deep Tissue Pressure Injury (DTPI), present on admission        Wound history/plan of care:   Patient was up in chair for at least 24 hours at home. Patient alert to self.     Wound base: 90 % non-blanchable, purple, and epidermis with some pale maceration, 10 % blister and dermis     Palpation of the wound bed: normal      Drainage: scant     Description of drainage: serous     Measurements (length x width x depth, in cm) 11  x 7  x  0.1 cm      Tunneling N/A     Undermining N/A  Periwound skin: Intact      Color: pink      Temperature: normal   Odor: none  Pain: no grimacing or signs of discomfort, none  Pain intervention prior to dressing change: patient tolerated well and slow and gentle cares   Treatment goal: Heal  and Protection  STATUS: initial assessment  Supplies ordered: supplies stored on unit, discussed with RN, and discussed with patient     My PI Risk Assessment     Sensory Perception: 2 - Very Limited     Moisture: 2 - Very moist      Activity: 3 - Walks occasionally      Mobility: 3 - Slightly limited      Nutrition: 2 - Probably inadequate      Friction/Shear: 1 - Problem     TOTAL: 13       Wound location:  "Left arm skin tears    Last photo: 3/8/24  Left elbow    Left hand      Wound due to: Skin Tear  Wound history/plan of care: Wounds present on admission  Wound base:Left elbow  100 % dermis 1.5cm x 1.5cm x 0.1cm, Left hand mix of adipose tissue and granular tissue in a 7cm x 1cm x 0.5cm area     Palpation of the wound bed: normal      Drainage: small     Description of drainage: serosanguinous     Measurements (length x width x depth, in cm): see above     Tunneling: N/A     Undermining: N/A  Periwound skin: Intact      Color: normal and consistent with surrounding tissue      Temperature: normal   Odor: none  Pain: no grimacing or signs of discomfort, none  Pain interventions prior to dressing change: patient tolerated well  Treatment goal: Heal  and Decrease moisture  STATUS: initial assessment  Supplies ordered: supplies stored on unit, discussed with RN, and discussed with patient       Treatment Plan:     Sacrum wound: Daily   1. Clean wound with saline or MicroKlenz Spray, pat dry  2. Wipe / \"clean\" the surrounding periwound tissue with skin prep (Cavilon No Sting Skin Prep #[id number]) and allow to dry. This will help protect periwound and help dressing adherence  3. Press a Mepilex Sacral to the area, making sure to conform nicely to skin curvatures.   4. Time and date dressing change    Left elbow wound: Q5D  Cleanse with wound cleanser. Pat dry.   Cover with oil emulsion gauze.  Secure with optifoam gentle with bordered dressing.  Time and date    Left hand wound: Every other day  Moisten silver alginate with saline for easy removal  Cleanse with wound cleanser. Pat dry.  Apply Aquacel Ag ([id number]) to wound bed.  Cover with optifoam gentle with bordered dressing   Time and date.    Pressure Injury Prevention (PIP) Plan:  If patient is declining pressure injury prevention interventions: Explore reason why and address patient's concerns, Educate on pressure injury risk and prevention intervention(s), If patient " "is still declining, document \"informed refusal\" , and Ensure Care team is aware ( provider, charge nurse, etc)  Mattress: Follow bed algorithm, reassess daily and order specialty mattress, if indicated.  HOB: Maintain at or below 30 degrees, unless contraindicated  Repositioning in bed: Every 1-2 hours , Left/right positioning; avoid supine, and Raise foot of bed prior to raising head of bed, to reduce patient sliding down (shear)  Heels: Keep elevated off mattress and Pillows under calves  Protective Dressing: Sacral Mepilex for prevention (#440044),  especially for the agitated patient   Positioning Equipment: None  Chair positioning: Chair cushion (#[id number])  and Assist patient to reposition hourly   If patient has a buttock pressure injury, or high risk for PI use chair cushion or SPS.  Moisture Management: Perineal cleansing /protection: Follow Incontinence Protocol, Avoid brief in bed, Clean and dry skin folds with bathing , Consider InterDry (#106562) between folds, and Moisturize dry skin  Under Devices: Inspect skin under all medical devices during skin inspection , Ensure tubes are stabilized without tension, and Ensure patient is not lying on medical devices or equipment when repositioned  Ask provider to discontinue device when no longer needed.         Orders: Written    RECOMMEND PRIMARY TEAM ORDER: None, at this time  Education provided: importance of repositioning, plan of care, and Off-loading pressure  Discussed plan of care with: Patient and Nurse  WOC nurse follow-up plan:  1-2x weekly  Notify WOC if wound(s) deteriorate.  Nursing to notify the Provider(s) and re-consult the WOC Nurse if new skin concern.    DATA:     Current support surface: Standard  Standard gel/foam mattress (IsoFlex, Atmos air, etc)  Containment of urine/stool: Incontinent pad in bed  BMI: Body mass index is 17.94 kg/m .   Active diet order: Orders Placed This Encounter      Combination Diet Regular Diet Adult; " Mechanical/Dental Soft Diet; Slightly Thick (level 1)     Output: I/O last 3 completed shifts:  In: 1020 [P.O.:120; I.V.:900]  Out: 200 [Urine:200]     Labs:   Recent Labs   Lab 03/08/24  1107 03/07/24  1256   ALBUMIN  --  3.0*   HGB 11.0* 11.1*   WBC 12.7* 7.1     Pressure injury risk assessment:   Sensory Perception: 3-->slightly limited  Moisture: 3-->occasionally moist  Activity: 2-->chairfast  Mobility: 3-->slightly limited  Nutrition: 2-->probably inadequate  Friction and Shear: 2-->potential problem  Jamin Score: 15    Sophia Browne CWOCN   Dept. Vocera- Contact [facility] Nurse (Padmini) via  Vocera   Dept. Office Number: [phone]

## 2024-03-09 NOTE — PROGRESS NOTES
"   03/09/24 1310   Appointment Info   Signing Clinician's Name / Credentials (SLP) Laura Jackson MS CCC-SLP   General Information   Onset of Illness/Injury or Date of Surgery 03/07/24   Referring Physician Quintin Fajardo MD   Patient/Family Therapy Goal Statement (SLP) Pt would like thin liquids - encouraged discussion on his goals of care and understanding risk of aspiration with MD   Pertinent History of Current Problem Pt admitted with failure to thrive. See EMR for extensive medical hx. Concern for growing nodule on right tongue base with decline in ability to communicate   General Observations Pt alert and upright in chair eating lunch. Pt heard coughing from the hallway. Noted [facility] soft diet order placed with large vegetables and pasta. Pt expressed strong dislike for slightly thick liquids. After initial cue, pt able to follow chin tuck strategy.   Oral Motor   Oral Musculature generally intact   Dentition (Oral Motor)   Dentition (Oral Motor) dental appliance/dentures   Dental Appliance/Denture (Oral Motor) upper;poor fit   Facial Symmetry (Oral Motor)   Facial Symmetry (Oral Motor) WNL   Lip Function (Oral Motor)   Lip Range of Motion (Oral Motor) WNL   Tongue Function (Oral Motor)   Tongue ROM (Oral Motor) WNL   Vocal Quality/Secretion Management (Oral Motor)   Vocal Quality (Oral Motor) hypophonic;hoarse   Secretion Management (Oral Motor) WNL   General Swallowing Observations   Current Diet/Method of Nutritional Intake (General Swallowing Observations, NIS) slightly thick liquids (level 1)  (mechanical soft)   Respiratory Support room air   Past History of Dysphagia Pt is well known to SLP dept. Recent VFSS 2/23/24 with recommendation of IDDSI level 6 and slightly thick liquids with chin tuck strategy. \"Pt should sit upright, take one small sip at a time & avoid straws. Caregivers can cue for small sip with chin tuck, pt unable to recall independently.\"   Clinical Swallow Evaluation   Feeding Assistance no " assistance needed   Clinical Swallow Evaluation Textures Trialed slightly thick liquids   Clinical Swallow Eval: Slightly Thick Liquids   Mode of Presentation cup;self-fed   Volume Presented 6oz   Oral Phase premature pharyngeal entry   Pharyngeal Phase impaired;reduction of laryngeal movement;repeated swallows   Successful Strategies Trialed During Procedure chin tuck   Diagnostic Statement Improved tolerance with repositioning and chin tuck strategy; no overt s/sx of aspiration   Clinical Swallow Evaluation: Solid Food Texture Trial   Mode of Presentation self-fed   Volume Presented Pasta with meat sauce   Oral Phase impaired mastication   Pharyngeal Phase coughing/choking;repeated swallows   Diagnostic Statement Impaired mastication d/t ill fitting dentures. Mild oral residue and suspect pharyngeal residue with coughing episode prior to SLP arrival. With cuing and smaller bites, no overt s/sx of aspiration   Esophageal Phase of Swallow   Patient reports or presents with symptoms of esophageal dysphagia Yes   Swallowing Recommendations   Diet Consistency Recommendations soft & bite-sized (level 6);slightly thick liquids (level 1)   Supervision Level for Intake 1:1 supervision needed   Mode of Delivery Recommendations bolus size, small;no straws;food moistened;slow rate of intake   Postural Recommendations chin tuck  (*Cues required to remind patient)   Swallowing Maneuver Recommendations alternate food and liquid intake   Monitoring/Assistance Required (Eating/Swallowing) check mouth frequently for oral residue/pocketing;stop eating activities when fatigue is present;monitor for cough or change in vocal quality with intake   Recommended Feeding/Eating Techniques (Swallow Eval) maintain upright sitting position for eating;maintain upright posture during/after eating for 30 minutes;minimize distractions during oral intake;provide assist with feeding   Medication Administration Recommendations, Swallowing (SLP) as  tolerated   General Therapy Interventions   Planned Therapy Interventions Dysphagia Treatment   Dysphagia treatment Instruction of safe swallow strategies   Clinical Impression   Criteria for Skilled Therapeutic Interventions Met (SLP Eval) Yes, treatment indicated   SLP Diagnosis Moderate oropharyngeal dysphagia   Risks & Benefits of therapy have been explained evaluation/treatment results reviewed;care plan/treatment goals reviewed;risks/benefits reviewed;current/potential barriers reviewed;participants voiced agreement with care plan;participants included;patient   Clinical Impression Comments Pt appears to be near his baseline, moderate oropharyngeal dysphagia on clinical swallow evaluation. Cues and supervision required to implement strategies and reduce risk of aspiration. Pt reported overall goal to have thin liquids again. Reviewed rationale and encouraged follow up with Palliative Care.   SLP Total Evaluation Time   Eval: oral/pharyngeal swallow function, clinical swallow Minutes (14880) 10   SLP Goals   Therapy Frequency (SLP Eval) 5 times/week   SLP Predicted Duration/Target Date for Goal Attainment 03/30/24   SLP Goals Swallow   SLP: Safely tolerate diet without signs/symptoms of aspiration Easy to chew diet;Thin liquids;With use of compensatory swallow strategies;With assistance/supervision   Swallowing Dysfunction &/or Oral Function for Feeding   Treatment of Swallowing Dysfunction &/or Oral Function for Feeding Minutes (98989) 15   Symptoms Noted During/After Treatment None   Treatment Detail/Skilled Intervention Pt following cues for chin tuck. Provided visual aids and reinforced importance. Reviewed hx of dysphagia.   SLP Discharge Planning   SLP Plan Chin tuck training   SLP Discharge Recommendation Transitional Care Facility   SLP Rationale for DC Rec Acute on chronic dysphagia,  modified diet and need to utilize swallow strategies to reduce risk of aspiration. Ongoing skilled services needed if  goals of care remain restorative - pt wanting to discuss goals.   SLP Brief overview of current status  Recommend a soft and bite sized diet (IDDSI 6) with small sips of slight thick liquids (no straws). Pt should sit upright, take one small sip at a time & avoid straws. Caregivers can cue for small sip with chin tuck, pt unable to recall independently.   Total Session Time   Total Session Time (sum of timed and untimed services) 25

## 2024-03-09 NOTE — PLAN OF CARE
Goal Outcome Evaluation:    Summary:  AMS, adult FTT  DATE & TIME: 3/9/24 9994-6064   Cognitive Concerns/ Orientation : Oriented to self, calm & cooperative this shift  BEHAVIOR & AGGRESSION TOOL COLOR: Green  CIWA SCORE: NA   ABNL VS/O2: tachycardic; soft BP, orthostatic Bps (+); other VSS on 2 L NC. Uses 3 L baseline.   MOBILITY: Ax1-2 GB/W, pivot to chair  PAIN MANAGMENT: denies  DIET: Soft & Bite Size Diet; Slightly Thick (level 1). Up right, no straws. Cue for small sips with chin tucks. Occasionally coughs while eating. Takes pills whole in pudding/applesauce.  BOWEL/BLADDER:  Incontinent of bladder at times, frequent encourage urinal use. Incontinent of bowel,x1 BM this shift  ABNL LAB/BG: n/a  DRAIN/DEVICES: L PIV infusing   TELEMETRY RHYTHM: Afib CVC w/ occasional PVCs   SKIN: Scattered bruises, skin tears on L arm and hand; preventative dressing on heels; existing non blanchable redness and wound on sacrum, Mepilex CDI; thin and frail skin. +1 LE edema (elevated on Pillows)  TESTS/PROCEDURES: none this shift  D/C DAY/GOALS/PLACE: pending. PT recommending TCU  OTHER IMPORTANT INFO: palliative/SW consulted; PT/WOC/SPL following. LS diminished.

## 2024-03-09 NOTE — PLAN OF CARE
Summary:  AMS, adult FTT  DATE & TIME: 3/8/24 2314-1748   Cognitive Concerns/ Orientation : Oriented to self, calm  BEHAVIOR & AGGRESSION TOOL COLOR: Green, calm and cooperative this shift.  CIWA SCORE: NA   ABNL VS/O2: tachycardic; soft BP; other VSS on 2-3 L NC. Uses 3 L baseline.   MOBILITY: Ax1-2 GB/W, pivot to chair  PAIN MANAGMENT: denies  DIET: Mechanical/Dental Soft Diet; Slightly Thick (level 1), poor appetite. Occasionally coughs while eating. Takes pills whole in pudding/applesauce.  BOWEL/BLADDER:  Incontinent of bladder at times, also using urinal.  Incontinent of bowel, no bm this shift.  ABNL LAB/BG: WBC 12.7. Plt 145. BC- NGTD  DRAIN/DEVICES: L PIV SL  TELEMETRY RHYTHM: Afib RVR with PVC  SKIN: Scattered bruises, skin tears on L arm and hand; preventative dressing on heels; existing non blanchable redness and wound on sacrum, Mepilex CDI; thin and frail skin. +1 LE edema (elevated on Pillows)  TESTS/PROCEDURES: none this shift  D/C DAY/GOALS/PLACE: pending. PT recommending TCU  OTHER IMPORTANT INFO: SPL/palliative/SW consulted; PT/WOC following. LS diminished.

## 2024-03-09 NOTE — PROGRESS NOTES
"[facility]    Medicine Progress Note - Hospitalist Service    Date of Admission:  3/7/2024  Date of Service: 03/09/2024    Assessment & Plan      Hermilo Velasquez is a 89 year old male with a past medical history significant for lung cancer s/p wedge resection, recurrent c.diff, afib, chronic obstructive pulmonary disease, pulmonary embolism, aortic stenosis s/p bioprosthetic heart valve, chronic kidney disease admitted on 3/7/2024 for further evaluation of altered mental status.     Patient presents to the ED hemodynamically stable. SpO2 93% on 5 L NC (chronically on 3L O2 baseline). Temperature 98.9. Per EMS and patient's wife, patient's health has been declining over past three months. He experienced a fall 03/04/2024 for which he was seen in the ED and had a negative head CT. Wife states patient sat down in a chair yesterday afternoon (03/06/2024) and essentially hasn't gotten up since. He was noted to be non-verbal upon EMS arrival, although he responded to pain. Upon arrival to [facility] ED, patient open eyes to voice and will respond verbally with \"yes or no.\" Wife notes while patient has had difficulty communicating since his nodule in his mouth has grown larger, he does usually communicate regularly with her at home. Patient is very lethargic upon examination and unable to provide his own history. Labs in the ED generally unremarkable with no leukocytosis. Procalcitonin 0.79 (improved from 21.39 from 02/19 hospital stay). Lactate WNL (1.9). Blood cultures obtained and pending. No significant electrolyte abnormality or worsening of his chronic anemia (hgb 11.1 and baseline 9.0). Glucose 116. . VBGs notable for a pH 7.36, CO2 67, Bicarb 38. UA and urine drug screen obtained and negative for infection. Influenza, COVID and RSV pending. Ketone quantitative <0.18. Urine drug screen negative. CXR demonstrated stable size of cardiac silhouette status post median sternotomy and " aortic valve replacement. Slight improvement in aeration in the right lower lobe with decreased conspicuity of airspace disease since prior exam. Persistent small bilateral pleural effusions, right larger than left. No pneumothorax. CT negative for acute intracranial pathology.     Of note, patient has history of encephalopathy during several past hospital stays, most recently for sepsis secondary to bilateral pneumonia 02/19/2024 - 02/28/2024.     Failure to thrive  Goals of care  *Overall, this is patient's 7th admission since 08/2023 with continued ongoing decline at home. TCU has been recommended multiple times and at every discharge, however, patient continues to decline. Palliative care has been included in past hospitalizations to discuss goals of care. Patient wants restorative care including full code. Today, wife stating she is unable to manage patient at home and is agreeable to TCU, should patient be appropriate for TCU placement.   Plan:  - Palliative care consult again requested, appreciate the cares.  - PT/OT consulted, appreciate the cares.   - CM/SW consulted, appreciate the cares.    Severe aortic stenosis s/p bioprosthetic aortic valve  Hypertension  Hyperlipidemia  Elevated BNP  Chronically elevated troponin  Hx of orthostatic hypotension  - IVF for orthostatic BPs today  - Transthoracic echocardiogram (TTE) 2/21/24, limited study, see report.  - Continue prior to admission fludrocortisone for history of hypotension.   - Monitor, follow-up with primary clinic provider and cardiology upon discharge.     Acute encephalopathy   Suspect dementia with occasional delirium and agitation   *By report, unclear how far off baseline mental status. Patient has history of encephalopathy during several past hospital stays, most recently for sepsis secondary to bilateral pneumonia 02/19/2024 - 02/28/2024.   *CT head upon admission negative for acute intracranial pathology. Brain MRI obtained 01/2024 and  "demonstrated no acute intracranial process. Mild dural thickening and enhancement along the left lateral frontal lobe, nonspecific, but likely unchanged compared to prior brain MRI 06/28/2023. Generalized brain atrophy and presumed microvascular ischemic changes as detailed above.  - Continue gabapentin and cymbalta once med rec completed.   - Delirium prevention:   --Reorient patient at each interaction.  --Give patient window bed if possible.  --Keep room lights on and blinds open during day (8am-8pm), low lights 8pm-8am.  --Minimize interruptions of sleep at night (consolidate vitals, nursing assessments, medications).  - PRN PO Seroquel available and IV/IM.  - Continue to monitor with morning labs (CBC, CMP, lactate, VBG).     Acute on chronic hypoxic respiratory failure on 3L O2 at home  Chronic obstructive pulmonary disease (COPD)  Recent hospitalization fro sepsis secondary to bilateral pneumonia   *No significant wheezing recently, low suspicion of acute COPD exacerbation at this time. Requiring 5L O2 upon arrival.   Plan:  - Spo2 goal > 88%.   - Wean from 5 L oxygen as able ; on chronic oxygen prior to admission 3L NC, history of chronic obstructive pulmonary disease (COPD).  - Continue PTA inhalers once med rec completed.   - Wean O2 as above, encourage spirometry.  - Consider steroid 03/08 if minimal improvement overnight.     FDG avid soft tissue nodule in the right tongue base, needs follow-up   *Concern for squamous cell carcinoma; also concern if causing aspiration and PNA.  - Appointment with ENT on 1/11 was cancelled to work up the lung first.  - Wife planned to make appointment with ENT several months ago.   - ENT  2/23/24: \"Interval PET imaging with diagnostic CT neck with contrast for further characterization would be necessary and recommended before considering biopsy. Ultimately, direct laryngoscopy with biopsy in the operating room would be necessary to obtain tissue diagnosis from this site. " "Would not recommend any further imaging or surgical intervention until determination from medical oncology appropriateness for any chemotherapeutic management. Findings in the base of tongue would not be treated surgically at this time. \"    - Follow-up with primary clinic provider upon discharge.     Dysphagia, modified diet  Suspect secondary to soft tissue nodule noted above   - Continue dysphagia diet.  - Speech consulted in hospital last visit; recommended slightly thick diet.   - Encourage oral intake.  - Ensure enlive between meals.     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 Planned for pet scan and biopsy of nodule on 1/9 was cancelled by the patient.  - Cytology on pleural fluid 2/20, negative for malignancy; acute inflammation noted.  - Outpatient follow-up with oncology, primary clinic provider to review/arrange.    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     Hx of VTE/pulmonary embolism (PE)   *History of subsegmental pulmonary embolism (PE) on 11/25/23  - Not on anticoagulation due to GI bleed. Hx of IVC filter in the past, not clear if it was removed.  - Follow-up with primary clinic provider upon discharge.     Paroxysmal atrial fibrillation  *Not on anticoagulation due to anemia thought to be due to a GI bleed. Has history of angioextasia and diverticulosis.   - Telemetry.     Anemia, normocytic  Thrombocytopenia, resolved  *No worsening of patient's chronic anemia. Hgb 11.1 and baseline 9.0.   - Continue to monitor with morning labs.       Benign prostatic hypertrophy (BPH)  - Continue PTA tamsulosin (Flomax) once med rec complete.   - Monitor for urinary retention, bladder scan PRN.      History of GI bleed  Gastroesophageal reflux disease (GERD)  Anemia  - Continue PTA famotidine, allergic to PPI.  - Monitor " "hemoglobin as above.    Chronic kidney disease (CKD) stage 2  *Baseline creatinine around 0.8, 1.09 on admission.   - Start maintenance IV fluids; suspect due to dehydration and limited PO intake; decreased from 100 mL/hr to 50 mL/hr.   - Encourage PO intake.   - Avoid nephrotoxic agents as able.     Depression/Anxiety/Pain  - PTA Duloxetine and gabapentin doses reduced per family request last hospitalization. Continue reduced doses once med rec complete.      Deep Tissue Pressure Injury to sacrum and bilateral buttocks  - WOC consulted, appreciate the cares.      Weakness and deconditioning   - Physical therapy (PT), CM/SW consulted, appreciate the cares. Patient's wife believe's patient would now be served better in TCU versus home as she is unable to care for patient independently. Last hospitalization, however, patient refused repeatedly.           Diet: Combination Diet Regular Diet Adult; Mechanical/Dental Soft Diet; Slightly Thick (level 1)  Snacks/Supplements Adult: Ensure Enlive; Between Meals  Room Service    DVT Prophylaxis: Pneumatic Compression Devices  Suazo Catheter: Not present  Lines: None     Cardiac Monitoring: ACTIVE order. Indication: Atrial Fibrillation  Code Status: Full Code      Clinically Significant Risk Factors              # Hypoalbuminemia: Lowest albumin = 3 g/dL at 3/7/2024 12:56 PM, will monitor as appropriate     # Hypertension: Noted on problem list    # Chronic heart failure with preserved ejection fraction: heart failure noted on problem list and last echo with EF >50%       # Cachexia: Estimated body mass index is 17.94 kg/m  as calculated from the following:    Height as of this encounter: 1.778 m (5' 10\").    Weight as of this encounter: 56.7 kg (125 lb)., PRESENT ON ADMISSION       # Financial/Environmental Concerns:    # COPD: noted on problem list        Disposition Plan     Expected Discharge Date: 03/11/2024                    Quintin Fajardo MD  Hospitalist Service  [facility] " [facility]  Securely message with [employer] (more info)  Text page via [employer] Paging/Directory   ______________________________________________________________________    Interval History     No acute events overnight  No CP/SOB  No fevers  O2 needs stable  Positive orthostatic   No new complaints from patient    Physical Exam   Vital Signs: Temp: 97.6  F (36.4  C) Temp src: Oral BP: 110/66 Pulse: 108   Resp: 18 SpO2: 95 % O2 Device: Nasal cannula Oxygen Delivery: 2 LPM  Weight: 125 lbs .01 oz    GENERAL: no apparent distress  HEENT: Normocephalic, atraumatic.  PULMONOLOGY: CTABL  CARDIAC: Tachycardic with regular rhythm.   ABDOMEN: Soft, nontender non distended.   MUSCULOSKELETAL:  Moving x 4 spontaneously. LE edema bilaterally.  NEURO: Blanca. Alert and oriented to person only.    ----------------------------------------------------------------------------------------    Medical Decision Making       37 MINUTES SPENT BY ME on the date of service doing chart review, history, exam, documentation & further activities per the note.      Data   ------------------------- PAST 24 HR DATA REVIEWED -----------------------------------------------    I have personally reviewed the following data over the past 24 hrs:    12.7 (H)  \   11.0 (L)   / 145 (L)     144 103 12.5 /  105 (H)   3.9 30 (H) 0.97 \     Procal: N/A CRP: N/A Lactic Acid: 1.5         Imaging results reviewed over the past 24 hrs:   No results found for this or any previous visit (from the past 24 hour(s)).  ------------------------- ENCOUNTER LABS ----------------------------------------------------------------  Recent Labs   Lab 03/08/24  1107 03/07/24  1256 03/04/24  1317   WBC 12.7* 7.1 6.6   HGB 11.0* 11.1* 10.4*   MCV 84 84 84   * 180 159    140 139   POTASSIUM 3.9 3.8 3.5   CHLORIDE 103 98 100   CO2 30* 30* 29   BUN 12.5 13.6 12.9   CR 0.97 1.09 0.84   ANIONGAP 11 12 10   AMRITA 8.4 9.0 8.6   * 116* 100*   ALBUMIN  --  3.0*   --    PROTTOTAL  --  7.2  --    BILITOTAL  --  0.6  --    ALKPHOS  --  89  --    ALT  --  11  --    AST  --  34  --        Most Recent 3 CBC's:  Recent Labs   Lab Test 03/08/24  1107 03/07/24  1256 03/04/24  1317   WBC 12.7* 7.1 6.6   HGB 11.0* 11.1* 10.4*   MCV 84 84 84   * 180 159     Most Recent 3 BMP's:  Recent Labs   Lab Test 03/08/24  1107 03/07/24  1256 03/04/24  1317    140 139   POTASSIUM 3.9 3.8 3.5   CHLORIDE 103 98 100   CO2 30* 30* 29   BUN 12.5 13.6 12.9   CR 0.97 1.09 0.84   ANIONGAP 11 12 10   AMRITA 8.4 9.0 8.6   * 116* 100*     Most Recent 2 LFT's:  Recent Labs   Lab Test 03/07/24  1256 02/19/24 2054   AST 34 17   ALT 11 7   ALKPHOS 89 93   BILITOTAL 0.6 0.9     Most Recent 3 INR's:  Recent Labs   Lab Test 02/19/24  2105 01/08/24  1755 09/12/23  1628   INR 1.37* 1.53* 1.15     Most Recent 3 Troponin's:  Recent Labs   Lab Test 04/14/21  0506 04/14/21  0039 04/13/21  2038   TROPI 0.513* 0.529* 0.331*     Most Recent 3 BNP's:  Recent Labs   Lab Test 02/19/24  2054 01/15/24  1855 11/25/23  2301 04/14/21  0506 08/28/20  1145 08/11/20  1247   NTBNPI 30,280* 24,255* 6,480*   < >  --   --    NTBNP  --   --   --   --  1,233* 1,593*    < > = values in this interval not displayed.     Most Recent D-dimer:  Recent Labs   Lab Test 03/30/20  1500   DD 4.6*     Most Recent Cholesterol Panel:  Recent Labs   Lab Test 11/30/21  1123   CHOL 138   LDL 71   HDL 55   TRIG 58     Most Recent 6 Bacteria Isolates From Any Culture (See EPIC Reports for Culture Details):  Recent Labs   Lab Test 04/14/21  0815 04/13/21 2054 04/13/21  2038 09/13/20  1329 09/13/20  1256 08/15/20  1451   CULT Canceled, Test credited  >10 Squamous epithelial cells/low power field indicates oral contamination. Please   recollect.  *  Notification of test cancellation was given to  Elva Ospina RN [id number] 4/14/20 by AM   No growth No growth No growth No growth No growth  No growth     Most Recent Urinalysis:  Recent Labs   Lab  Test 03/07/24  1437 08/17/23  0213 11/23/22  1541   COLOR Yellow   < > Yellow   APPEARANCE Clear   < > Clear   URINEGLC Negative   < > Negative   URINEBILI Negative   < > Negative   URINEKETONE Negative   < > Negative   SG 1.027   < > 1.020   UBLD Negative   < > Negative   URINEPH 5.5   < > 5.5   PROTEIN 20*   < > Trace*   UROBILINOGEN  --   --  0.2   NITRITE Negative   < > Negative   LEUKEST Negative   < > Negative   RBCU 0   < > 0-2   WBCU 3   < > 0-5    < > = values in this interval not displayed.     Most Recent ESR & CRP:  Recent Labs   Lab Test 10/06/22  1526 05/16/19  0641   SED 43* 89*   CRP  --  127.0*   CRPI 11.40*  --

## 2024-03-09 NOTE — PROVIDER NOTIFICATION
MD Notification    Notified Person: MD    Notified Person Name: Tana     Notification Date/Time: 03/09/24      Notification Interaction: vocera    Purpose of Notification: (+) ortho BPs this AM, in flowchart    Orders Received: IV FLUIDS ORDERED    Comments:

## 2024-03-09 NOTE — PLAN OF CARE
Goal Outcome Evaluation:      Plan of Care Reviewed With: patient      Summary:  AMS, adult FTT  DATE & TIME: 3/8/24-3/9/24 [id number]   Cognitive Concerns/ Orientation : Oriented to self, calm  BEHAVIOR & AGGRESSION TOOL COLOR: Green  CIWA SCORE: NA   ABNL VS/O2: tachycardic; soft BP; other VSS on 3 L NC. Uses 3 L baseline.   MOBILITY: Ax1-2 GB/W, pivot to chair  PAIN MANAGMENT: denies  DIET: Mechanical/Dental Soft Diet; Slightly Thick (level 1), poor appetite. Occasionally coughs while eating. Takes pills whole in pudding/applesauce.  BOWEL/BLADDER:  Incontinent of bladder at times, also using urinal.  Incontinent of bowel, no bm this shift.  ABNL LAB/BG: none new this shift  DRAIN/DEVICES: L PIV SL  TELEMETRY RHYTHM: Afib CVR   SKIN: Scattered bruises, skin tears on L arm and hand; preventative dressing on heels; existing non blanchable redness and wound on sacrum, Mepilex CDI; thin and frail skin. +1 LE edema (elevated on Pillows)  TESTS/PROCEDURES: none this shift  D/C DAY/GOALS/PLACE: pending. PT recommending TCU  OTHER IMPORTANT INFO: SPL/palliative/SW consulted; PT/WOC following. LS diminished.

## 2024-03-09 NOTE — PROGRESS NOTES
03/08/24 1102   Appointment Info   Signing Clinician's Name / Credentials (PT) Corby Duffy, PT, DPT   Living Environment   People in Home spouse   Current Living Arrangements house   Transportation Anticipated family or friend will provide   Living Environment Comments Patient having difficulty providing information about living environment. Per chart,  patient lives in house with his spouse. Unclear whether there are any stairs patient will need to navigation   Self-Care   Usual Activity Tolerance moderate   Current Activity Tolerance fair   Fall history within last six months yes   Number of times patient has fallen within last six months   (patient unable to state number of falls)   Activity/Exercise/Self-Care Comment Patient having difficulty providing information about prior level of function.   General Information   Onset of Illness/Injury or Date of Surgery 03/07/24   Referring Physician Alma Rosa Vazquez PA-C   Patient/Family Therapy Goals Statement (PT) Patient unable to state   Pertinent History of Current Problem (include personal factors and/or comorbidities that impact the POC) 89 year old male with a past medical history significant for lung cancer s/p wedge resection, recurrent c.diff, afib, chronic obstructive pulmonary disease, pulmonary embolism, aortic stenosis s/p bioprosthetic heart valve, chronic kidney disease admitted on 3/7/2024 for further evaluation of altered mental status.   Existing Precautions/Restrictions fall;oxygen therapy device and L/min   General Observations Patient on 2L supplemental O2 via NC throughout session. On 2LPM at baseline.   Cognition   Affect/Mental Status (Cognition) confused   Orientation Status (Cognition) oriented to;person;time   Follows Commands (Cognition) follows one-step commands;50-74% accuracy;increased processing time needed;physical/tactile prompts required;repetition of directions required   Cognitive Status Comments patient may be hard of  hearing. Difficulty understanding speech at times during session   Pain Assessment   Patient Currently in Pain Yes, see Vital Sign flowsheet  (chronic back pain)   Strength (Manual Muscle Testing)   Strength (Manual Muscle Testing) Deficits observed during functional mobility   Strength Comments Able to perform SLR in supine, but unable to hold position.   Bed Mobility   Comment, (Bed Mobility) supine to sit transfer with min assist x1   Transfers   Comment, (Transfers) sit<>stand transfer with min-mod assist x1 and FWW   Gait/Stairs (Locomotion)   Distance in Feet (Gait) 3'   Comment, (Gait/Stairs) Patient completed short bout of ambulation bed to chair with FWW and min assist x1   Balance   Balance Comments impaired standing balance requiring BUE support on FWW   Sensory Examination   Sensory Perception patient reports no sensory changes   Clinical Impression   Criteria for Skilled Therapeutic Intervention Yes, treatment indicated   PT Diagnosis (PT) impaired mobility   Influenced by the following impairments weakness, reduced activity tolerance, impaired balance, cognitive concerns   Functional limitations due to impairments impaired functional mobility, impaired gait, transfers, bed mobility, stair navigation   Clinical Presentation (PT Evaluation Complexity) stable   Clinical Presentation Rationale clinical judgement   Clinical Decision Making (Complexity) low complexity   Planned Therapy Interventions (PT) balance training;bed mobility training;gait training;ROM (range of motion);strengthening;transfer training;neuromuscular re-education;patient/family education;progressive activity/exercise   Risk & Benefits of therapy have been explained patient   PT Total Evaluation Time   PT Usman Low Complexity Minutes (89645) 9   Physical Therapy Goals   PT Frequency 5x/week   PT Predicted Duration/Target Date for Goal Attainment 03/15/24   PT Goals Bed Mobility;Transfers;Gait;Stairs   PT: Bed Mobility  Supervision/stand-by assist;Supine to/from sit   PT: Transfers Supervision/stand-by assist;Sit to/from stand;Assistive device   PT: Gait Supervision/stand-by assist;Rolling walker;100 feet   PT: Stairs   (unclear whether patient has stairs at home)   Interventions   Interventions Quick Adds Gait Training;Therapeutic Activity   Therapeutic Activity   Therapeutic Activities: dynamic activities to improve functional performance Minutes (05118) 24   Symptoms Noted During/After Treatment Fatigue;Shortness of breath   Treatment Detail/Skilled Intervention Patient supine in bed at beginning of session, agreeable to participate in PT. Hard of hearing throughout session. Confusion noted. Difficulty understanding speech intermittently during session. On 2L supplemental O2 via NC throughout, SpO2 remained >90%. PAtient performing supine to sit transfer with min assist x1, HOB elevated, and use of bedrails. Cues for sequencing, pulling on PT's arm to position trunk upright. Cues for scooting hips to EOB. Patient performing sit<>stand transfer with min-mod assist x1 and FWW. Cues for hand placement and sequencing. Posterior lean noted upon standing initially, cues and facilitation for anterior weight shift. Moving from bed to chair with min-mod assist x1 and FWW. In middle of transfer, patient impulsively reaching and grabbing onto armrest and would not let go. Requiring facilitation to remove hand in middle of transfer. Cues and assist for turning walker. Cues for increased step length and increased foot clearance. stand to sit with min assist x1. Seated in recliner at end of session with call light next to him and chair alarm on. Notified nursing staff of patient performance and level of assist. All needs within reach. Noted HR ranging from 75-129bpm with mobility. Patient denies symptoms throughout.   Gait Training   Distance in Feet 3'   PT Discharge Planning   PT Plan progress ambulation distance   PT Discharge  Recommendation (DC Rec) Transitional Care Facility   PT Rationale for DC Rec Pient below baseline functional mobility. PResents with weakness, confusion, impaired balance, and reduced activity tolerance resulting in the need for assist x1 and FWW. Only able to walk 3' today. Recommend discharge to TCU for improvement of strength, activity tolerance, balance, and independence with functional mobility. May need more supportive living environment following TCU stay.   PT Brief overview of current status assist x2 and FWW for nursing staff with transfers and mobility due to patient impulsivity and difficulty with command following   Total Session Time   Timed Code Treatment Minutes 24   Total Session Time (sum of timed and untimed services) 33

## 2024-03-09 NOTE — PLAN OF CARE
Goal Outcome Evaluation:    Summary:  AMS, adult FTT  DATE & TIME: 3/8/24 [id number]    Cognitive Concerns/ Orientation : Oriented to self  BEHAVIOR & AGGRESSION TOOL COLOR: Green, Mostly cooperative this shift, can be resistive with cares at times. PRN Seroquel available   CIWA SCORE: NA   ABNL VS/O2: tachycardic; soft BP; other VSS on 2 L NC. Uses 3 L baseline.   MOBILITY: A1-2 GB/W, pivot to chair  PAIN MANAGMENT: denies  DIET: Mechanical/Dental Soft Diet; Slightly Thick (level 1), poor appetite. Occasionally coughs while eating. Takes pills whole in yogurt.  BOWEL/BLADDER:  Incont bladder at times, impulsive to get out of bed to use urinal; x1 smeary BM  ABNL LAB/BG: WBC 12.7. Plt 145. BC- NGTD  DRAIN/DEVICES: L PIV SL  TELEMETRY RHYTHM: Afib RVR with PVC  SKIN: Scattered bruises, skin tears on L arm and hand; preventative dressing on heels; existing non blanchable redness and wound on sacrum, Mepilex CDI; thin and frail skin. +1 LE edema (elevated on Pillows)  TESTS/PROCEDURES: none this shift  D/C DAY/GOALS/PLACE: pending. PT recommending TCU  OTHER IMPORTANT INFO: SPL/palliative/SW consulted; PT/WOC following. LS diminished.

## 2024-03-10 NOTE — PLAN OF CARE
Goal Outcome Evaluation:                  Summary:  AMS, adult Failure to thrive  DATE & TIME: 3/10/24 2580-6803  Cognitive Concerns/ Orientation : Oriented to self, sleeping between cares- calm & cooperative   BEHAVIOR & AGGRESSION TOOL COLOR: Green  CIWA SCORE: NA   ABNL VS/O2: VSS on 2-3 L NC. Uses 3 L baseline.   MOBILITY: Ax2 GB/W, pivot to chair- refused OOB for meals today, very sleepy  PAIN MANAGMENT: denies  DIET: Soft & Bite Size Diet; Slightly Thick (level 1). Up right, no straws. Cue for small sips with chin tucks. Takes pills whole in pudding/applesauce.  Pt refusing both meals- wife updated.  BOWEL/BLADDER:  Incontinent of bladder. no BM this shift.   ABNL LAB/BG: None new  DRAIN/DEVICES: L PIV saline locked  TELEMETRY RHYTHM: Afib CVR with occasional PVCs  SKIN: Scattered bruises and small scabs, skin tears on L arm and hand- mepilex CDI; preventative dressing on heels; existing non blanchable redness and wound on sacrum, Mepilex CDI; thin and frail skin. +1 LE edema (elevated on Pillows)  TESTS/PROCEDURES: none this shift  D/C DAY/GOALS/PLACE: pending. PT recommending TCU  OTHER IMPORTANT INFO: palliative/SW consulted; PT/WOC/SPL following. LS diminished. States he has baseline neuropathy in BLE

## 2024-03-10 NOTE — PLAN OF CARE
Goal Outcome Evaluation:    Summary:  AMS, adult FTT  DATE & TIME: 3/9/24 5189-5400   Cognitive Concerns/ Orientation : Oriented to self, calm & cooperative this shift  BEHAVIOR & AGGRESSION TOOL COLOR: Green  CIWA SCORE: NA   ABNL VS/O2: tachycardic; orthostatic Bps (+); other VSS on 2 L NC. Uses 3 L baseline.   MOBILITY: Ax1-2 GB/W, pivot to chair  PAIN MANAGMENT: denies  DIET: Soft & Bite Size Diet; Slightly Thick (level 1). Up right, no straws. Cue for small sips with chin tucks. Occasionally coughs while eating. Takes pills whole in pudding/applesauce.  BOWEL/BLADDER:  Incontinent of bladder at times, frequent encourage urinal use. Incontinent of bowel,x1 BM this shift  ABNL LAB/BG: BC-NGTD  DRAIN/DEVICES: L PIV infusing LR at 100 mL/HR  TELEMETRY RHYTHM: Afib CVR w/ occasional PVCs   SKIN: Scattered bruises, skin tears on L arm and hand; preventative dressing on heels; existing non blanchable redness and wound on sacrum, Mepilex CDI; thin and frail skin. +1 LE edema (elevated on Pillows)  TESTS/PROCEDURES: none this shift  D/C DAY/GOALS/PLACE: pending. PT recommending TCU  OTHER IMPORTANT INFO: palliative/SW consulted; PT/WOC/SPL following. LS diminished.

## 2024-03-10 NOTE — PLAN OF CARE
Summary:  AMS, adult FTT  DATE & TIME: 3/9/24 2439-8263   Cognitive Concerns/ Orientation : Oriented to self, calm & cooperative this shift  BEHAVIOR & AGGRESSION TOOL COLOR: Green  CIWA SCORE: NA   ABNL VS/O2: Tachycardic on 2 L NC. Uses 3 L baseline.   MOBILITY: Ax1-2 GB/W, pivot to chair  PAIN MANAGMENT: denies  DIET: Soft & Bite Size Diet; Slightly Thick (level 1). Up right, no straws. Cue for small sips with chin tucks. Takes pills whole in pudding/applesauce.  BOWEL/BLADDER:  Incontinent of bladder at times, frequent encourage urinal use. Large incontinent BM this shift.   ABNL LAB/BG: None new  DRAIN/DEVICES: L PIV saline locked  TELEMETRY RHYTHM: Afib CVR w/ occasional PVCs   SKIN: Scattered bruises, skin tears on L arm and hand; preventative dressing on heels; existing non blanchable redness and wound on sacrum, Mepilex CDI; thin and frail skin. +1 LE edema (elevated on Pillows)  TESTS/PROCEDURES: none this shift  D/C DAY/GOALS/PLACE: pending. PT recommending TCU  OTHER IMPORTANT INFO: palliative/SW consulted; PT/WOC/SPL following. LS diminished.

## 2024-03-10 NOTE — PROGRESS NOTES
"[facility]    Medicine Progress Note - Hospitalist Service    Date of Admission:  3/7/2024    Interval History   Patient lying in bed.  Tries to talk but is very weak.  Denies any acute complaints.  Multiple admissions.  Discussions regarding goals of care.    Assessment & Plan   Hermilo Velasquez is a 89 year old male with a past medical history significant for lung cancer s/p wedge resection, recurrent c.diff, afib, chronic obstructive pulmonary disease, pulmonary embolism, aortic stenosis s/p bioprosthetic heart valve, chronic kidney disease admitted on 3/7/2024 for further evaluation of altered mental status.      Patient presented to the ED hemodynamically stable. SpO2 93% on 5 L NC (chronically on 3L O2 baseline). Temperature 98.9. Per EMS and patient's wife, patient's health has been declining over past three months. He experienced a fall 03/04/2024 for which he was seen in the ED and had a negative head CT. Wife states patient sat down in a chair on afternoon (03/06/2024) and essentially hasn't gotten up since. He was noted to be non-verbal upon EMS arrival, although he responded to pain. Upon arrival to [facility] ED, patient open eyes to voice and will respond verbally with \"yes or no.\" Wife notes while patient has had difficulty communicating since his nodule in his mouth has grown larger, he does usually communicate regularly with her at home. Patient is very lethargic upon examination and unable to provide his own history. Labs in the ED generally unremarkable with no leukocytosis. Procalcitonin 0.79 (improved from 21.39 from 02/19 hospital stay). Lactate WNL (1.9). Blood cultures obtained .  No significant electrolyte abnormality or worsening of his chronic anemia (hgb 11.1 and baseline 9.0). Glucose 116. . VBGs notable for a pH 7.36, CO2 67, Bicarb 38. UA and urine drug screen obtained and negative for infection. Influenza, COVID and RSV pending. Ketone quantitative " <0.18. Urine drug screen negative. CXR demonstrated stable size of cardiac silhouette status post median sternotomy and aortic valve replacement. Slight improvement in aeration in the right lower lobe with decreased conspicuity of airspace disease since prior exam. Persistent small bilateral pleural effusions, right larger than left. No pneumothorax. CT negative for acute intracranial pathology.      Of note, patient has history of encephalopathy during several past hospital stays, most recently for sepsis secondary to bilateral pneumonia 02/19/2024 - 02/28/2024.      Failure to thrive  Goals of care  7th admission since 08/2023 with continued ongoing decline at home.   TCU has been recommended multiple times and at every discharge, however, patient continues to decline.  Palliative care has been included in past hospitalizations to discuss goals of care.   Patient wants restorative care including full code. Wife this admit stating she is unable to manage patient at home and is agreeable to TCU, should patient be appropriate for TCU placement.   Palliative care consult again requested,.   Strongly recommend ongoing discussion regarding goals of care     Severe aortic stenosis s/p bioprosthetic aortic valve  Hypertension  Hyperlipidemia  Elevated BNP  Chronically elevated troponin  Hx of orthostatic hypotension  IVF for orthostatic BPs 3/9   2/21/2024 ECHO: Difficult secondary to patient intolerant to the study.  LV normal.  Right ventricle not well-visualized however global systolic function is mildly reduced.  Aortic valve replacement with 25 mm [employer] trifecta bioprosthetic valve.  On Florinef 0.1 mg (1/2 tablet daily )      Acute encephalopathy   Suspect dementia with occasional delirium and agitation   Difficult to establish baseline of mental status.   Patient has history of encephalopathy during several past hospital stays, most recently for sepsis secondary to bilateral pneumonia 02/19/2024 -  "02/28/2024.   CT head upon admission negative for acute intracranial pathology.   Brain MRI obtained 01/2024 and demonstrated no acute intracranial process. Mild dural thickening and enhancement along the left lateral frontal lobe, nonspecific, but likely unchanged compared to prior brain MRI 06/28/2023. Generalized brain atrophy and presumed microvascular ischemic changes as detailed above.  3/8 resumed on Cymbalta 40 mg daily  3/8 resumed on Neurontin 300 mg at at bedtime  PRN PO Seroquel available and IV/IM.  Continue to monitor with morning labs (CBC, CMP, lactate, VBG).      Acute on chronic hypoxic respiratory failure on 3L O2 at home  Chronic obstructive pulmonary disease (COPD)  Recent hospitalization fro sepsis secondary to bilateral pneumonia   No significant wheezing recently, low suspicion of acute COPD exacerbation at this time. Required 5L O2 upon arrival.   3/10 currently on 2.5 L.  History of COPD  Continue PTA inhalers once med rec completed.      FDG avid soft tissue nodule in the right tongue base, needs follow-up   Concern for squamous cell carcinoma; also concern if causing aspiration and PNA.  Appointment with ENT on 1/11 was cancelled to work up the lung first.  Wife planned to make appointment with ENT several months ago.   ENT  2/23/24: \"Interval PET imaging with diagnostic CT neck with contrast for further characterization would be necessary and recommended before considering biopsy.   Ultimately, direct laryngoscopy with biopsy in the operating room would be necessary to obtain tissue diagnosis from this site. Would not recommend any further imaging or surgical intervention until determination from medical oncology appropriateness for any chemotherapeutic management. Findings in the base of tongue would not be treated surgically at this time. \"    Follow-up with primary clinic provider upon discharge.   Ongoing discussions regarding palliative care     Dysphagia, modified diet  Suspect " secondary to soft tissue nodule noted above   Continue dysphagia diet.  Speech consulted in hospital last visit; recommended slightly thick diet.   Encourage oral intake.  Ensure enlive between meals.      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 Planned for pet scan and biopsy of nodule on 1/9 was cancelled by the patient.  Cytology on pleural fluid 2/20, negative for malignancy; acute inflammation noted.  Outpatient follow-up with oncology, primary clinic provider to review/arrange.     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     Hx of VTE/pulmonary embolism (PE)   History of subsegmental pulmonary embolism (PE) on 11/25/23  Not on anticoagulation due to GI bleed. Hx of IVC filter in the past, not clear if it was removed.  Follow-up with primary clinic provider upon discharge.      Paroxysmal atrial fibrillation  Not on anticoagulation due to anemia thought to be due to a GI bleed. Has history of angioextasia and diverticulosis.   Telemetry.      Anemia, normocytic  Thrombocytopenia, resolved  No worsening of patient's chronic anemia. Hgb 11.1 and baseline 9.0.   Continue to monitor with morning labs.       Benign prostatic hypertrophy (BPH)  Continue PTA tamsulosin (Flomax) once med rec complete.   Monitor for urinary retention, bladder scan PRN.      History of GI bleed  Gastroesophageal reflux disease (GERD)  Anemia  Continue PTA famotidine, allergic to PPI.  Monitor hemoglobin as above.     Chronic kidney disease (CKD) stage 2  Baseline creatinine around 0.8, 1.09 on admission.   Start maintenance IV fluids; suspect due to dehydration and limited PO intake; decreased from 100 mL/hr to 50 mL/hr.   Encourage PO intake.   Avoid nephrotoxic agents as able.      Depression/Anxiety/Pain  PTA Duloxetine and gabapentin doses reduced per  "family request last hospitalization.   Continued on duloxetine and gabapentin     Deep Tissue Pressure Injury to sacrum and bilateral buttocks  WOC consulted, appreciate the cares.      Weakness and deconditioning   Physical therapy (PT), CM/SW consulted, appreciate the cares.  Patient's wife believe's patient would now be served better in TCU versus home as she is unable to care for patient independently.   Last hospitalization, however, patient refused repeatedly.      Diet: Combination Diet Regular Diet Adult; Mechanical/Dental Soft Diet; Slightly Thick (level 1)  Snacks/Supplements Adult: Ensure Enlive; Between Meals  Room Service    DVT Prophylaxis: Pneumatic Compression Devices  Suazo Catheter: Not present  Lines: None     Cardiac Monitoring: ACTIVE order. Indication: Atrial Fibrillation  Code Status: Full Code          Clinically Significant Risk Factors              # Hypoalbuminemia: Lowest albumin = 3 g/dL at 3/7/2024 12:56 PM, will monitor as appropriate     # Hypertension: Noted on problem list  # Chronic heart failure with preserved ejection fraction: heart failure noted on problem list and last echo with EF >50%       # Cachexia: Estimated body mass index is 17.94 kg/m  as calculated from the following:    Height as of this encounter: 1.778 m (5' 10\").    Weight as of this encounter: 56.7 kg (125 lb)., PRESENT ON ADMISSION     # Financial/Environmental Concerns:    # COPD: noted on problem list        Disposition Plan     Expected Discharge Date: 03/11/2024                    INOCENCIA HALL MD  Hospitalist Service  [facility]  Securely message with [employer] (more info)  Text page via [employer] Paging/Directory   ______________________________________________________________________      Physical Exam   Vital Signs: Temp: 98  F (36.7  C) Temp src: Axillary BP: 110/73 Pulse: 106   Resp: 16 SpO2: 100 % O2 Device: Nasal cannula Oxygen Delivery: 2.5 LPM  Weight: 125 lbs .01 oz    General " Appearance:  Patient is extremely weak.   Respiratory: Decreased BS bilaterally   Cardiovascular: Regular rate with no gallop or rub  GI: + BS, soft, non tender  Skin: no overt edema       Medical Decision Making       50 MINUTES SPENT BY ME on the date of service doing chart review, history, exam, documentation & further activities per the note.    Called and discussed with his wife. He is declining.   Reviewed his chart and medical conditions.   Discussion regarding palliative/hospice cares.   Reviewed findings and labs.     Imaging results reviewed over the past 24 hrs:         No results found for this or any previous visit (from the past 24 hour(s)).

## 2024-03-10 NOTE — PLAN OF CARE
Goal Outcome Evaluation:      Plan of Care Reviewed With: patient      Summary:  VANDANA, adult FTT  DATE & TIME: 3/9/24-3/10/24 6113-2911  Cognitive Concerns/ Orientation : Oriented to self, calm & cooperative   BEHAVIOR & AGGRESSION TOOL COLOR: Green  CIWA SCORE: NA   ABNL VS/O2: VSS on 2 L NC. Uses 3 L baseline.   MOBILITY: Ax1-2 GB/W, pivot to chair  PAIN MANAGMENT: denies  DIET: Soft & Bite Size Diet; Slightly Thick (level 1). Up right, no straws. Cue for small sips with chin tucks. Takes pills whole in pudding/applesauce.  BOWEL/BLADDER:  Incontinent of bladder at times, frequent encourage urinal use. small incontinent BM this shift.   ABNL LAB/BG: None new  DRAIN/DEVICES: L PIV saline locked  TELEMETRY RHYTHM: Afib CVR with occasional PVCs  SKIN: Scattered bruises, skin tears on L arm and hand; preventative dressing on heels; existing non blanchable redness and wound on sacrum, Mepilex CDI; thin and frail skin. +1 LE edema (elevated on Pillows)  TESTS/PROCEDURES: none this shift  D/C DAY/GOALS/PLACE: pending. PT recommending TCU  OTHER IMPORTANT INFO: palliative/SW consulted; PT/WOC/SPL following. LS diminished. States he has baseline neuropathy in BLE-some numbness.

## 2024-03-11 NOTE — PROGRESS NOTES
"CLINICAL NUTRITION SERVICES  -  ASSESSMENT NOTE    Recommendations Ordered by Registered Dietitian (RD):     Ordered standard trays  Continue Ensure Enlive supplements     Malnutrition:   % Weight Loss:  > 10% in 6 months (severe malnutrition)  % Intake:  unable to determine w/ limited hx  Subcutaneous Fat Loss:  Orbital region moderate depletion and Upper arm region moderate depletion  Muscle Loss:  Temporal region severe depletion, Clavicle bone region severe depletion, and Dorsal hand region severe depletion  Fluid Retention:  trace    Malnutrition Diagnosis: Severe malnutrition  In Context of:  Acute illness or injury  Chronic illness or disease     REASON FOR ASSESSMENT  Hermilo Velasquez is a 89 year old male seen by Registered Dietitian for Admission Nutrition Risk Screen for answering \"unsure\" to recently losing weight without trying and \"yes\" to recently eating poorly d/t a decrease in appetite.     PMH of lung cancer s/p wedge resection, recurrent c.diff, afib, chronic obstructive pulmonary disease, pulmonary embolism, aortic stenosis s/p bioprosthetic heart valve, chronic kidney disease. Presents with failure to thrive.    NUTRITION HISTORY    - Patient known to nutrition services. Was previously seen 2/24 by RD's. Patient met severe malnutrition at this time, was ordered standard trays, and had Gel+ at lunch.  - Spoke with patient at bedside. Patient quite confused/disoriented at time of visit. Asked who ?Roberta was and asked for the phone to call her. When asked about how his appetite has been he said, \"Good I think\". Could not get accurate nutrition history at this time due to patient condition.    CURRENT NUTRITION ORDERS  Diet Order: Soft & Bite Sized (level 6); Slightly Thick (level 1)  Supplements: Ensure Enlive between meals  Room service w/ assist    Current Intake/Tolerance:  - 25-75% intakes documented per flowsheets. Sometimes refuses meals.  - When asked if he was receiving/drinking Ensure he " "wasn't sure what I was talking about/didn't know if he was consuming them.    NUTRITION FOCUSED PHYSICAL ASSESSMENT FOR DIAGNOSING MALNUTRITION)  Yes            Observed:    Muscle wasting (refer to documentation in Malnutrition section) and Subcutaneous fat loss (refer to documentation in Malnutrition section)    ANTHROPOMETRICS  Height: 5' 10\"  Weight: 60.5 kg, 133 lbs 6.05 oz  Body mass index is 19.14 kg/m .  Weight Status:  Normal BMI  IBW: 75.5 kg  % IBW: 80%  Weight History: wt trending down. 15.5% wt loss in 6 months.  Wt Readings from Last 25 Encounters:   03/11/24 60.5 kg (133 lb 6.1 oz)   02/27/24 55.8 kg (123 lb)   02/13/24 62.6 kg (138 lb)   02/12/24 62.6 kg (138 lb)   02/09/24 63.5 kg (140 lb)   02/07/24 63.5 kg (140 lb)   02/05/24 63.5 kg (140 lb)   02/02/24 67.1 kg (148 lb)   01/30/24 67.1 kg (148 lb)   01/29/24 68 kg (150 lb)   01/24/24 68 kg (150 lb)   01/23/24 68 kg (150 lb)   01/22/24 68 kg (150 lb)   01/21/24 65.1 kg (143 lb 8.3 oz)   01/09/24 62.1 kg (136 lb 14.5 oz)   12/07/23 63.5 kg (140 lb)   11/30/23 63.8 kg (140 lb 10.5 oz)   10/26/23 66.7 kg (147 lb 1.6 oz)   10/19/23 67.6 kg (149 lb 1.6 oz)   09/29/23 69.4 kg (152 lb 14.4 oz)   09/16/23 71.6 kg (157 lb 13.6 oz)   09/07/23 68.1 kg (150 lb 3.2 oz)   08/29/23 68.9 kg (151 lb 14.4 oz)   08/17/23 67.4 kg (148 lb 9.4 oz)   07/17/23 68.9 kg (152 lb)     LABS  Labs reviewed    MEDICATIONS  Medications reviewed    ASSESSED NUTRITION NEEDS PER APPROVED PRACTICE GUIDELINES:  Dosing Weight 60.5 kg  Estimated Energy Needs: 6680-6580 kcals (30-35 Kcal/Kg)  Justification: repletion  Estimated Protein Needs: 75-95+ grams protein (1.2-1.5+ g pro/Kg)  Justification: Repletion  Estimated Fluid Needs: 1 mL/kcal or per provider pending fluid status    NUTRITION DIAGNOSIS:  Malnutrition related to suspected poor appetite 2/2 aging as evidenced by 15.5% wt loss in 6 months, moderate-severe fat and muscle loss      NUTRITION INTERVENTIONS  Recommendations / " Nutrition Prescription  See above    Implementation  Nutrition education: Not appropriate at this time due to patient condition  Medical Food Supplement - continued  Standard meal trays    Nutrition Goals  Patient to consume >50% of meals and at least 1 supplement/day    MONITORING AND EVALUATION:  Progress towards goals will be monitored and evaluated per protocol and Practice Guidelines    Echo Lunsford RD, LD  Clinical Dietitian - [employer]

## 2024-03-11 NOTE — PLAN OF CARE
DATE & TIME: 3/10/24 6216-5224    Cognitive Concerns/ Orientation : Pt is oriented to self and knows he is in [place]. Calm and cooperative   BEHAVIOR & AGGRESSION TOOL COLOR: Green   ABNL VS/O2: VSS on 2-3L NC (baseline is 3L NC)  MOBILITY: Assist x1-2 with walker and gait belt, fall risk. Reposition q2h.  PAIN MANAGMENT: Denies  DIET: Regular, soft and bite sized. Slightly thick liquids.   BOWEL/BLADDER: Incontinent of bladder at times. Urinal offered to patient. No BM this shift.  DRAIN/DEVICES: IV SL  TELEMETRY RHYTHM: Afib RVR with BBB  SKIN: Scattered bruises and scabs. Skin tears on left arms, mepilex CDI. Non blanchable redness and wound on sacrum, mepilex CDI.    TESTS/PROCEDURES: Palliative consult pending  D/C DATE: Discharge pending progress, PT recommending TCU  OTHER IMPORTANT INFO: PT/WOC/SPL following. Lung sounds diminished.

## 2024-03-11 NOTE — PLAN OF CARE
Summary:  AMS, adult FTT  DATE & TIME: 3/10/24 Evenings   Cognitive Concerns/ Orientation : Oriented to self. Sleeping most of shift with a few episodes of alertness this shift. Calm and cooperative with cares.   BEHAVIOR & AGGRESSION TOOL COLOR: Green  CIWA SCORE: NA   ABNL VS/O2: VSS on 2-3 L NC. Uses 3 L baseline. Cont POX   MOBILITY: 1-2PA GB and walker. Refused chair. Sat up for dinner in bed and able to feed self supervised. Turned and repositioned.   PAIN MANAGMENT: Denies  DIET: Soft & Bite Size Diet; Slightly Thick (level 1). Up right, no straws. Cue for small sips with chin tucks. Takes pills whole in pudding/applesauce.  Reported pt refused breakfast and lunch. Ate 75% of dinner supervised.   BOWEL/BLADDER:  Incontinent of bladder. Urinal offered with turns. No BM this shift.   ABNL LAB/BG: Anion gap 5,   DRAIN/DEVICES: L PIV saline locked  TELEMETRY RHYTHM: Afib CVR with occasional PVCs  SKIN: Scattered bruises and small scabs, skin tears on L arm and hand- mepilex CDI; preventative dressing on heels; existing non blanchable redness and wound on sacrum, Mepilex CDI; thin and frail skin. +1 LE edema (elevated on Pillows)  TESTS/PROCEDURES: AM labs   D/C DAY/GOALS/PLACE: Pending. PT recommending TCU.   OTHER IMPORTANT INFO: Palliative/SW consulted; PT/WOC/SPL following. LS diminished. Bed alarm on for safety. Rounded on freq.     *Pt's code status changed to DNR/DNI after MD conversation with spouse and son. Wristband changed.

## 2024-03-11 NOTE — CONSULTS
"  Consultation    Hermilo Velasquez MRN# [id number]   YOB: 1932 Age: 89 year old   Date of Admission: 3/7/2024  Requesting physician: Dr. Woodruff  Reason for consult: Prognosis           Assessment and Plan:     Hermilo is a 89 year old man admitted with AMS    Failure to thrive  - Hermilo is not a good candidate for systemic or local therapy if we were to prove that these base of tongue lesions are cancerous. Surgery would lead to deleterious side effects.   - Dr. Dreyer had a discussion about options on 3/5/2024. He was going to repeat a PET/CT.  - Need ongoing follow up with family  - Agree with Palliative Care    Bucky CARABALLO, CNP  [facility]  [phone] (office)              Chief Complaint:   Altered Mental Status         History of Present Illness:   This patient is a 89 year old male admitted with AMS.    He is feeling well today. He does not see that there are issues with repeated hospitalization. He thinks he is able to manage at home.    He would not be a candidate for treatment if we were to prove H&N cancer.         Physical Exam:   Vitals were reviewed  Blood pressure 123/78, pulse 106, temperature 97.5  F (36.4  C), temperature source Oral, resp. rate 18, height 1.778 m (5' 10\"), weight 60.5 kg (133 lb 6.1 oz), SpO2 95%.  Temperatures:  Current - Temp: 97.5  F (36.4  C); Max - Temp  Av.8  F (36.6  C)  Min: 97.5  F (36.4  C)  Max: 98  F (36.7  C)  Respiration range: Resp  Av  Min: 16  Max: 20  Pulse range: Pulse  Av  Min: 65  Max: 111  Blood pressure range: Systolic (24hrs), Av , Min:110 , Max:123   ; Diastolic (24hrs), Av, Min:59, Max:78    Pulse oximetry range: SpO2  Av.4 %  Min: 92 %  Max: 100 %    Intake/Output Summary (Last 24 hours) at 3/11/2024 1241  Last data filed at 3/11/2024 0603  Gross per 24 hour   Intake 290 ml   Output 300 ml   Net -10 ml     GENERAL: No acute distress.  HEART: Regular rate and rhythm with no murmurs.  LUNGS: Clear " bilaterally. NC oxygen.  ABDOMEN: Soft, nontender, nondistended with no palpable hepatosplenomegaly.  MENTAL: Alert and oriented to person, place, and time.  NEURO: Cranial nerves II through XII grossly intact with no focal motor or sensory deficits.              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 Vasques     Aortic stenosis     Severe AS, 9/2015 AVR - [facility] TRIFECTA Bovine bioprosthesis 25MM TF-[id number]    Atrial fibrillation (H)     9/2015 Paroxysmal post op Afib - discharged on Warfarin and a beta blocker    Bullous pemphigoid (H28)     CKD (chronic kidney disease) stage 3, GFR 30-59 ml/min (H)     COPD (chronic obstructive pulmonary disease) (H)     Deep vein thrombosis (H)     GERD (gastroesophageal reflux disease)     Heart murmur     PATIENCE (iron deficiency anemia)     Monoclonal gammopathy     plasmacyte prominent causing monoclonal gammopathy    Need for SBE (subacute bacterial endocarditis) prophylaxis     Neuropathy     Other and unspecified hyperlipidemia     Other malignant lymphomas     non hodgkin's lymphoma    Pulmonary embolism (H)     RBBB (right bundle branch block)     Severe sepsis with acute organ dysfunction (H) 11/16/2015    Unspecified hereditary and idiopathic peripheral neuropathy              Past Surgical History:   I have reviewed this patient's past surgical history  Past Surgical History:   Procedure Laterality Date    APPENDECTOMY      ARTHROPLASTY KNEE Right 7/25/2022    Procedure: RIGHT TOTAL KNEE ARTHROPLASTY;  Surgeon: Giuliano Deshpande MD;  Location: [facility] OR    AS TOTAL KNEE ARTHROPLASTY      BACK SURGERY      ESOPHAGOSCOPY, GASTROSCOPY, DUODENOSCOPY (EGD), COMBINED N/A 11/28/2015    Procedure: COMBINED ESOPHAGOSCOPY, GASTROSCOPY, DUODENOSCOPY (EGD);  Surgeon: Danis Castillo MD;   Location:  GI    ESOPHAGOSCOPY, GASTROSCOPY, DUODENOSCOPY (EGD), COMBINED N/A 7/26/2018    Procedure: COMBINED ESOPHAGOSCOPY, GASTROSCOPY, DUODENOSCOPY (EGD);;  Surgeon: Toby Dong DO;  Location:  GI    ESOPHAGOSCOPY, GASTROSCOPY, DUODENOSCOPY (EGD), COMBINED N/A 8/17/2020    Procedure: ESOPHAGOGASTRODUODENOSCOPY (EGD);  Surgeon: Herrera Henry MD;  Location:  GI    ESOPHAGOSCOPY, GASTROSCOPY, DUODENOSCOPY (EGD), COMBINED N/A 10/24/2020    Procedure: ESOPHAGOGASTRODUODENOSCOPY (EGD);  Surgeon: Vick Florentino MD;  Location:  GI    ESOPHAGOSCOPY, GASTROSCOPY, DUODENOSCOPY (EGD), COMBINED N/A 4/11/2022    Procedure: ESOPHAGOGASTRODUODENOSCOPY (EGD);  Surgeon: Vick Florentino MD;  Location:  GI    ESOPHAGOSCOPY, GASTROSCOPY, DUODENOSCOPY (EGD), COMBINED N/A 8/26/2022    Procedure: ESOPHAGOGASTRODUODENOSCOPY (EGD);  Surgeon: El Mcgovern MD;  Location:  GI    HERNIA REPAIR  2006    HERNIORRHAPHY VENTRAL  4/17/2013    Procedure: HERNIORRHAPHY VENTRAL;  VENTRAL HERNIA REPAIR WITH MESH;  Surgeon: Patel Guzman MD;  Location:  OR    KNEE SURGERY      arthroscopic right knee surgery     LOBECTOMY LUNG Right 3/26/2019    POSSIBLE LOBECTOMY LUNG per Dr[facility]    REPAIR LIGAMENT ANKLE  2/23/2012    Procedure:REPAIR LIGAMENT ANKLE; LEFT TARSAL TUNNEL RELEASE OF KNOT OF ARIEL RELEASE; Surgeon:ASUL PUENTE; Location: OR    REPLACE VALVE AORTIC N/A 9/3/2015    Procedure: REPLACE VALVE AORTIC;  Surgeon: Antonino Mitchell MD;  Location:  OR    ROTATOR CUFF REPAIR RT/LT      bilateral    SPINE SURGERY      3 spine surgeries    THORACOTOMY, WEDGE RESECTION LUNG, COMBINED Right 3/26/2019    RIGHT THORACOTOMY, WEDGE RESECTION, RIGHT LOWER LOBE LUNG NODULE,;  Surgeon: Jose A Vasques MD;  Location:  OR    TONSILLECTOMY      TURP                 Social History:   I have reviewed this patient's social history  Social History     Tobacco Use  "   Smoking status: Former     Packs/day: 2.00     Years: 55.00     Additional pack years: 0.00     Total pack years: 110.00     Types: Cigarettes     Quit date: 1998     Years since quittin.1    Smokeless tobacco: Never   Substance Use Topics    Alcohol use: Not Currently             Family History:   I have reviewed this patient's family history  Family History   Problem Relation Age of Onset    C.A.D. Father     Emphysema Father     Melanoma No family hx of     Skin Cancer No family hx of              Allergies:     Allergies   Allergen Reactions    Omeprazole Itching    Pantoprazole Itching    Prevacid [Lansoprazole] Itching    Lasix [Furosemide] Rash    Lidocaine Blisters and Rash     Allergy to lidocaine ointment  Allergy to lidocaine ointment      Penicillin G Rash    Penicillins Rash     \"broke out from injection\" 60 yrs ago  Tolerates cephalosporins             Medications:   I have reviewed this patient's current medications  Medications Prior to Admission   Medication Sig Dispense Refill Last Dose    acetaminophen (TYLENOL) 500 MG tablet Take 1,000 mg by mouth 3 times daily as needed   Unknown at PRN    albuterol (PROAIR HFA/PROVENTIL HFA/VENTOLIN HFA) 108 (90 Base) MCG/ACT inhaler Inhale 2 puffs into the lungs every 6 hours as needed   Unknown at PRN    atorvastatin (LIPITOR) 10 MG tablet Take 1 tablet (10 mg) by mouth daily 90 tablet 0 Past Week at unknown time    calcium carbonate (TUMS) 500 MG chewable tablet Take 1 chew tab by mouth 2 times daily as needed for heartburn   Unknown at PRN    famotidine (PEPCID) 20 MG tablet Take 1 tablet (20 mg) by mouth daily Note dose reduction compared to prior to admission; refills to primary clinic provider 30 tablet 0 Past Week at unknown time    ferrous sulfate (FE TABS) 325 (65 Fe) MG EC tablet Take 1 tablet (325 mg) by mouth daily   Past Week at unknown time    fludrocortisone (FLORINEF) 0.1 MG tablet Take 1 tablet (0.1 mg) by mouth daily 90 tablet 3 " Past Week at unknown time    gabapentin (NEURONTIN) 400 MG capsule Take 1 capsule (400 mg) by mouth at bedtime Note dose reduction compared to prior to admission; refills to primary clinic provider 30 capsule 0 3/5/2024 at [facility]    ofloxacin (FLOXIN) 0.3 % otic solution Place 10 drops Into the left ear 2 times daily Take until supply is gone   Past Week at unknown time    ondansetron (ZOFRAN ODT) 4 MG ODT tab Take 1 tablet (4 mg) by mouth every 6 hours as needed for nausea   Unknown at PRN    potassium chloride ER (KLOR-CON M) 20 MEQ CR tablet Take 20 mEq by mouth daily   Past Week at unknown time    tamsulosin (FLOMAX) 0.4 MG capsule Take 1 capsule (0.4 mg) by mouth daily   Past Week at unknown time    tiotropium (SPIRIVA) 18 MCG inhaled capsule Inhale 18 mcg into the lungs daily   Past Week at unknown time    vancomycin (FIRVANQ) 50 MG/ML oral solution Take 2.5 mLs (125 mg) by mouth 2 times daily Future dosing, taper, and refills directed to primary clinic provider, review at upcoming visit 150 mL 0 3/6/2024 at AM    DULoxetine 40 MG CPEP Take 40 mg by mouth daily Note dose reduction compared to prior to admission; refills to primary clinic provider 30 capsule 0 Not Taking     Current Facility-Administered Medications Ordered in Epic   Medication Dose Route Frequency Last Rate Last Admin    acetaminophen (TYLENOL) tablet 650 mg  650 mg Oral Q4H PRN        Or    acetaminophen (TYLENOL) Suppository 650 mg  650 mg Rectal Q4H PRN        atorvastatin (LIPITOR) tablet 10 mg  10 mg Oral Daily   10 mg at 03/11/24 0839    calcium carbonate (TUMS) chewable tablet 1,000 mg  1,000 mg Oral 4x Daily PRN        DULoxetine (CYMBALTA) DR capsule 40 mg  40 mg Oral Daily   40 mg at 03/11/24 0838    famotidine (PEPCID) tablet 20 mg  20 mg Oral Daily   20 mg at 03/11/24 0839    fludrocortisone (FLORINEF) half-tab 0.1 mg  0.1 mg Oral Daily   0.1 mg at 03/11/24 0838    gabapentin (NEURONTIN) capsule 300 mg  300 mg Oral At Bedtime   300 mg  at 03/10/24 2158    HYDROmorphone (DILAUDID) injection 0.2 mg  0.2 mg Intravenous Q2H PRN        HYDROmorphone (DILAUDID) injection 0.4 mg  0.4 mg Intravenous Q2H PRN        lidocaine (LMX4) cream   Topical Q1H PRN        lidocaine 1 % 0.1-1 mL  0.1-1 mL Other Q1H PRN        naloxone (NARCAN) injection 0.2 mg  0.2 mg Intravenous Q2 Min PRN        Or    naloxone (NARCAN) injection 0.4 mg  0.4 mg Intravenous Q2 Min PRN        Or    naloxone (NARCAN) injection 0.2 mg  0.2 mg Intramuscular Q2 Min PRN        Or    naloxone (NARCAN) injection 0.4 mg  0.4 mg Intramuscular Q2 Min PRN        QUEtiapine (SEROquel) half-tab 12.5 mg  12.5 mg Oral Q6H PRN        senna-docusate (SENOKOT-S/PERICOLACE) 8.6-50 MG per tablet 1 tablet  1 tablet Oral BID PRN        Or    senna-docusate (SENOKOT-S/PERICOLACE) 8.6-50 MG per tablet 2 tablet  2 tablet Oral BID PRN        sodium chloride (PF) 0.9% PF flush 3 mL  3 mL Intracatheter Q8H   3 mL at 03/10/24 1119    sodium chloride (PF) 0.9% PF flush 3 mL  3 mL Intracatheter q1 min prn        tamsulosin (FLOMAX) capsule 0.4 mg  0.4 mg Oral Daily   0.4 mg at 03/11/24 0839    umeclidinium (INCRUSE ELLIPTA) 62.5 MCG/ACT inhaler 1 puff  1 puff Inhalation Daily   1 puff at 03/11/24 0838    vancomycin (FIRVANQ) oral solution 125 mg  125 mg Oral BID   125 mg at 03/11/24 0838     No current Epic-ordered outpatient medications on file.             Review of Systems:     The 10 point Review of Systems is negative other than noted in the HPI.            Data:   Data   Results for orders placed or performed during the hospital encounter of 03/07/24 (from the past 24 hour(s))   Basic metabolic panel   Result Value Ref Range    Sodium 142 135 - 145 mmol/L    Potassium 3.6 3.4 - 5.3 mmol/L    Chloride 105 98 - 107 mmol/L    Carbon Dioxide (CO2) 32 (H) 22 - 29 mmol/L    Anion Gap 5 (L) 7 - 15 mmol/L    Urea Nitrogen 11.8 8.0 - 23.0 mg/dL    Creatinine 0.81 0.67 - 1.17 mg/dL    GFR Estimate 83 >60 mL/min/1.73m2     Calcium 8.5 8.2 - 9.6 mg/dL    Glucose 114 (H) 70 - 99 mg/dL     *Note: Due to a large number of results and/or encounters for the requested time period, some results have not been displayed. A complete set of results can be found in Results Review.

## 2024-03-11 NOTE — CONSULTS
Palliative Care Consultation Note  [facility]      Patient: Hermilo Velasquez  Date of Admission:  3/7/2024    Requesting Clinician / Team: Dr Woodruff/Medicine  Reason for consult: Goals of care     Recommendations & Counseling     GOALS OF CARE:   Wife Roberta and son Mikal want patient to sign on to hospice at a facility upon discharge as she can no longer provide the level of care that he needs at home.  Roberta states that Hermilo does not wish to keep returning to the hospital.  They do not wish to proceed with planned biopsy.    Reviewed comfort care and hospice program.  Roberta stated that they would need a facility for care and would like to discuss with social work.  Reviewed family/patient wishes with Dr. Woodruff and will follow-up with patient and family.  Continue current cares for now.    ADVANCE CARE PLANNING:  No health care directive on file. Per  informed consent policy, next of kin should be involved if patient becomes unable.  There is a POLST form on file, this was reviewed and current.  Code status: No CPR- Do NOT Intubate    MEDICAL MANAGEMENT:   #Delirium  Avoid benzodiazepines, antihistamines, anticholinergics if able.  Lights on and blinds open during the day.  Reorient frequently.  Lights & TV off during the night.  Promote normal circadian rhythm.  Limit sensory deprivation - utilize hearing aids, glasses, etc.  Frequently assess basic needs such as temperature, elimination, thirst/hunger, pain  Consider bedside attendant (if able) for additional safety     #General Weakness/Debility   Assist with reposition as necessary.  Fall precautions- history of falling    PSYCHOSOCIAL/SPIRITUAL SUPPORT:  Family wife Roberta, son Mikal and a daughter  [place] community: [facility]     Palliative Care will continue to follow. Thank you for the consult and allowing us to aid in the care of Hermilo Velasquez.    Reviewed patient case with hospitalist Dr Woodruff, RNCC, and patient's RN,     Razia CHAND  "Jacqui, CNS  Securely message with Vocera (more info)  Text page via [facility] Paging/Directory      During regular M-F work hours (6043-0551) -- please contact me on Vocera   In my absence, securely message our team via Vocera \"[facility] [place]\" or go to On Call tab in [facility], search for \"[facility] [place]\"   After regular work hours and on weekends/holidays, to reach our on call physician, securely message via Vocera \"[facility] [place]\" or go to On Call tab in [facility], search for \"[facility] [place]\"     Palliative Summary/HPI     Hermilo Velasquez is a 89 year old male with a past medical history of 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 He was discharged home. He returned to ED via EMS due to unresponsiveness sitting in a chair for 20 hrs. Upon evaluation no emergent pathology was identified. CT scan of head was unremarkable. He was hospitalized for rehydration and evaluation of altered mental status. Current hospitalization will be his 7th within the past 6 months.      Today, the patient was seen for:  Goals of care.    Palliative Care Summary:   Met with Hermilo in room and later again with wife Roberta and son Mikal. Palliative care provider has seen patient during 3 hospitalizations in the past. Family is aware of or service and what we offer.     Prognosis, Goals, & Planning:    Functional Status just prior to this current hospitalization:  has been hospitalized 7 times in the past 6 months for recurrent falls, encephalopathy, weakness. There has been a " decline in daily function including decreased fx status, recurrent falls, weakness.  Palliative Performance Scale (PPS): 40%  Extensive disease. Mainly in bed w/little ambulation. ADLs/activities mainly w/assistance. Normal or reduced intake. Full LOC or drowsy or confused.  Estimated Median Survival in Days:18 to 41 days.   ECOG3 (Capable of only limited self-care; needs help with ADLs; in bed/chair >50% of waking hours)  Interim history/status while hospitalized:   Upon evaluation no emergent pathology was identified for reason of failure to thrive. CT scan of head was unremarkable. He was hospitalized for rehydration. Current hospitalization will be his 7th within the past 6 months. He has a soft tissue nodule in the right tongue base concerning for SCC and some suspicious areas in the lung that are possibly cancerous..     Prognosis, Goals, and/or Advance Care Planning:  We discussed general treatment options (full/restorative, selective/conservatives, and comfort only/hospice). We then discussed how these specifically apply to Hermilo's medical condition.   Discussed what continuing restorative/life-prolonging care entails, including continued (re)admissions to the hospital, continuing with preventative and primary care, seeing disease/organ specific specialty consultations for medical treatments in hopes to prolong life for as long as possible.    It was explained to patient and family that comfort care is a good option when the burdens of aggressive treatments outweigh the benefits and are unwanted by patient, or not in their best interest. Comfort care focuses on optimizing quality of life and relief from distressing symptoms such as pain, shortness of breath, nausea, and anxiety and allowing a natural dying process. When comfort care is initiated, restorative focused care and all artificial means to sustain life are discontinued, including further diagnostic testing, lab draws, blood glucose testing/insulin,  IV fluids, antibiotics, medications not for comfort, and vital signs. Roberta states that Hermilo does not want to return to the hospital again.  I reviewed how hospice would be a good focus of care for Hermilo as it is likely he will keep on his current trajectory of frequent hospitalizations.  He is quite cachectic and frail; treatment for cancer would likely end his life sooner.  Hermilo expressed that he did not like the thickened liquids.  Reviewed that on comfort care dietary recent directions could be lifted.   It was explained that hospice is not a place, but a type of care that a patient receives, if eligible (qualifying diagnosis and life expectancy of 6 months or less).  The three different hospice location scenarios (private residence, skilled nursing facility, or hospice home) were described. It was explained that the patient/family decides where to receive hospice care based on how much assistance the patient will need once discharged from hospital. Roberta states that they would need a facility as she can no longer provide the level of assistance that Hermilo requires.  Casey Ren agrees with this plan as well.    Code Status was addressed today:   No was recently changed to DNR/DNI by Dr Woodruff.    Patient's decision making preferences: with input from medical clinicians and loved ones        Patient has decision-making capacity today for complex decisions:Unreliable-Hermilo is unable to explain why he is in the hospital or understand the treatments and how they would affect him          Coping, Meaning, & Spirituality:   Mood, coping, and/or meaning in the context of serious illness were addressed today: Yes    Social:   Living situation:lives with significant other/spouse  Important relationships/caregivers:son Mikal, wife Roberta and daughter Deloris.    Medications:  I have reviewed this patient's medication profile and medications from this hospitalization. Notable medications:     Noted scheduled meds are:    atorvastatin  10 mg Oral Daily    DULoxetine  40 mg Oral Daily    famotidine  20 mg Oral Daily    fludrocortisone  0.1 mg Oral Daily    gabapentin  300 mg Oral At Bedtime    sodium chloride (PF)  3 mL Intracatheter Q8H    tamsulosin  0.4 mg Oral Daily    umeclidinium  1 puff Inhalation Daily    vancomycin  125 mg Oral BID       Noted PRN meds are:  acetaminophen **OR** acetaminophen, calcium carbonate, HYDROmorphone, HYDROmorphone, lidocaine 4%, lidocaine (buffered or not buffered), naloxone **OR** naloxone **OR** naloxone **OR** naloxone, QUEtiapine, senna-docusate **OR** senna-docusate, sodium chloride (PF)    ROS:  Comprehensive ROS is reviewed and is negative except as here & per HPI: Patient is an unreliable historian.      PHYSICAL EXAM:  Vital Signs: Temp: 97.5  F (36.4  C) Temp src: Oral BP: 123/78 Pulse: 106   Resp: 18 SpO2: 95 % O2 Device: Nasal cannula Oxygen Delivery: 3 LPM  Weight: 133 lbs 6.05 oz  Wt Readings from Last 4 Encounters:   03/11/24 60.5 kg (133 lb 6.1 oz)   02/27/24 55.8 kg (123 lb)   02/13/24 62.6 kg (138 lb)   02/12/24 62.6 kg (138 lb)     GENERAL:  Alert, fatigued, cachectic, no  distress, confused, appears chronically ill.  HEAD: Normocephalic atraumatic  SCLERA: Anicteric  EXTREMITIES: Warm; no edema  ABDOMEN:  Soft, nontender  RESPIRATORY: Breathing non labored on 3 L O2.  NEUROLOGIC: Alert, forgetful, hallucinations.  PSYCH: Calm, poor insight, confused.    Data reviewed:  Lab Results   Component Value Date    WBC 12.7 (H) 03/08/2024    WBC 7.1 03/07/2024    WBC 6.6 03/04/2024    HGB 11.0 (L) 03/08/2024    HGB 11.1 (L) 03/07/2024    HGB 10.4 (L) 03/04/2024    HCT 36.1 (L) 03/08/2024    HCT 36.4 (L) 03/07/2024    HCT 33.8 (L) 03/04/2024     (L) 03/08/2024     03/07/2024     03/04/2024     03/10/2024     03/08/2024     03/07/2024    POTASSIUM 3.6 03/10/2024    POTASSIUM 3.9 03/08/2024    POTASSIUM 3.8 03/07/2024    CHLORIDE 105 03/10/2024     CHLORIDE 103 03/08/2024    CHLORIDE 98 03/07/2024    CO2 32 (H) 03/10/2024    CO2 30 (H) 03/08/2024    CO2 30 (H) 03/07/2024    BUN 11.8 03/10/2024    BUN 12.5 03/08/2024    BUN 13.6 03/07/2024    CR 0.81 03/10/2024    CR 0.97 03/08/2024    CR 1.09 03/07/2024     (H) 03/10/2024     (H) 03/08/2024     (H) 03/07/2024    SED 43 (H) 10/06/2022    SED 89 (H) 05/16/2019    SED 83 (H) 05/15/2019    DD 4.6 (H) 03/30/2020    NTBNPI 30,280 (H) 02/19/2024    NTBNPI 24,255 (H) 01/15/2024    NTBNPI 6,480 (H) 11/25/2023    NTBNP 1,233 (H) 08/28/2020    NTBNP 1,593 (H) 08/11/2020    NTBNP 1,363 (H) 10/17/2014    TROPI 0.513 (HH) 04/14/2021    TROPI 0.529 (HH) 04/14/2021    TROPI 0.331 (HH) 04/13/2021    AST 34 03/07/2024    AST 17 02/19/2024    AST 60 (H) 01/15/2024    ALT 11 03/07/2024    ALT 7 02/19/2024    ALT 15 01/15/2024    ALKPHOS 89 03/07/2024    ALKPHOS 93 02/19/2024    ALKPHOS 102 01/15/2024    BILITOTAL 0.6 03/07/2024    BILITOTAL 0.9 02/19/2024    BILITOTAL 0.9 01/15/2024    DAMIAN 16 01/16/2024    DAMIAN 17 11/06/2022    INR 1.37 (H) 02/19/2024    INR 1.53 (H) 01/08/2024    INR 1.15 09/12/2023        Results for orders placed or performed during the hospital encounter of 03/07/24   Head CT w/o contrast    Impression    IMPRESSION:  Diffuse cerebral volume loss and cerebral white matter  changes consistent with chronic small vessel ischemic disease. No  evidence for acute intracranial pathology.      Radiation dose for this scan was reduced using automated exposure  control, adjustment of the mA and/or kV according to patient size, or  iterative reconstruction technique.    SMITA CHAVEZ MD         SYSTEM ID:  [id number]   XR Chest Port 1 View    Impression    IMPRESSION: Stable size of cardiac silhouette status post median  sternotomy and aortic valve replacement. Slight improvement in  aeration in the right lower lobe with decreased conspicuity of  airspace disease since prior exam. Persistent small  bilateral pleural  effusions, right larger than left. No pneumothorax. Bones are  unchanged.    DAKOTA COVARRUBIAS MD         SYSTEM ID:  [id number]     TOTAL TIME SPENT:  110 minutes.  Time spent in today's visit, review of chart/investigations today, communicating with other providers and documentation today.    Much or all of the text in this note was generated through the use of Dragon dictation, voice to text software. Errors in spelling or words which appear to be out of context are unintentional and may be present due to having escaped editing.

## 2024-03-11 NOTE — CONSULTS
Mr. Velasquez is a 89 yo w/ history of tongue squamous cell cancer admitted for failure to thrive. Requested biopsy of cervical LN biopsy. Per review of the chart, given clinical status, there are limited available treatment options.      No relevant imaging showing abnormal cervical lymph node per my review. Per consult note, there was reportedly an abnormal LN on PET/CT on 12/2023, however these images are not available for review.     Consider uploading these images or obtaining US neck to evaluate for LN for potential biopsy.    Heber Godoy MD

## 2024-03-12 NOTE — PLAN OF CARE
Physical Therapy Discharge Summary    Reason for therapy discharge:    Plan is to discharge with hospice.    Progress towards therapy goal(s). See goals on Care Plan in Epic electronic health record for goal details.  Goals not met.  Barriers to achieving goals:   Decline in status.    Therapy recommendation(s):    No further therapy is recommended.    Goal Outcome Evaluation:

## 2024-03-12 NOTE — PLAN OF CARE
Goal Outcome Evaluation:      Plan of Care Reviewed With: patient    Summary: Altered mental status, Adult failure to thrive.    DATE & TIME: 3/11/24 9199-1832   Cognitive Concerns/ Orientation : Pt is oriented to self and knows he is in [place]. Calm and cooperative, garbled speech.   BEHAVIOR & AGGRESSION TOOL COLOR: Green   ABNL VS/O2: VSS, O2 95% on 3L NC (baseline is 3L NC)  MOBILITY: Assist x1 with walker and gait belt, walks slowly, high fall risk. Reposition q2h. Sat on chair x1.   PAIN MANAGMENT: Denies pain  DIET: Regular, soft and bite sized. Slightly thick liquids.   BOWEL/BLADDER: Incontinent of bladder at times. Voided in bathroom x1, pvr showed 70.   DRAIN/DEVICES: IV SL  TELEMETRY RHYTHM: Afib RVR with occasional PVCs.   SKIN: Scattered bruises and scabs. Applied new dressing on Lt hand, Lt elbow and coccyx. Coccyx pressure injury with nonblanchable redness.   TESTS/PROCEDURES: IR biopsy of Rt cervical Lymph node?   D/C DATE: Discharge pending progress, PT recommending TCU  OTHER IMPORTANT INFO: Palliative care, Oncology, PT/WOC/SPL following.Lung sounds diminished. Per palliative care note, family has decided to sign on to hospice at a facility upon discharge, continue current cares for now.

## 2024-03-12 NOTE — TELEPHONE ENCOUNTER
FYI - Status Update    Who is Calling: Deloris    Update: About moving him from the hospital to nursing home not to his home    Does caller want a call/response back: Yes     Could we send this information to you in [facility] or would you prefer to receive a phone call?:   Patient would prefer a phone call   Okay to leave a detailed message?: Yes at Other phone number:  [phone]*

## 2024-03-12 NOTE — PROGRESS NOTES
[facility] Quick Note     Noted patient and family wishes to forego biopsy cervical lymph node and enroll patient in hospice / comfort measures. I discussed this option with the family and patient in detail on 3/11/24. This is very reasonable given his comorbidities and frequent hospitalizations. We will sign off the case. Please reconsult as needed.     Patrick Dreyer, DO  [facility]

## 2024-03-12 NOTE — PLAN OF CARE
Goal Outcome Evaluation:       DATE & TIME: 3/11/24 6440-3327   Cognitive Concerns/ Orientation : Pt is oriented to self, calm and cooperative, garbled speech, [place]   BEHAVIOR & AGGRESSION TOOL COLOR: Green   ABNL VS/O2: VSS, O2 95% on 3L NC (baseline is 3L NC)  MOBILITY: Assist x1/GB/walker, walks slowly, high fall risk, turn/repo q2h  PAIN MANAGMENT: Denies   DIET: Regular, soft and bite sized, slightly thick liquids  BOWEL/BLADDER: Incontinent of urine at times, used urinal with 125cc out, PVR- 21cc   DRAIN/DEVICES: IV SL  TELEMETRY RHYTHM: Afib RVR with occasional PVCs  SKIN: Scattered bruises and scabs. dressings on Lt hand/Lt elbow/coccyx D/I. Coccyx with nonblanchable redness  TESTS/PROCEDURES: IR biopsy of Rt cervical Lymph node?   D/C DATE: Discharge pending progress, PT recommending TCU  OTHER IMPORTANT INFO: Pt pleasantly confused, cooperative, slept well, denies pain, no s/s of pain/discomfort, LS-diminished, infrequent NPC. Palliative care, Oncology, PT/WOC/SPL following.

## 2024-03-12 NOTE — PROGRESS NOTES
"[facility]    Medicine Progress Note - Hospitalist Service    Date of Admission:  3/7/2024    Interval History:   Patient is comfortable. Goals of care discussed by palliative care team today and oncology.   Initial plans by family to consider LN biopsy but later decided to hold on the biopsy. Son Mikal and wife Roberta at bedside. Discussion regarding his overall health. He has been declining in overall health. Son notes probably even since age later 80.   We discussed various aspects of his health,(tongue mass, lung nodules increased, COPD, FTT, dysphagia, history of PE)   He has been admitted numerous times. Progressive weakness.     Assessment & Plan   Hermilo Velasquez is a 89 year old male with a past medical history significant for lung cancer s/p wedge resection, recurrent c.diff, afib, chronic obstructive pulmonary disease, pulmonary embolism, aortic stenosis s/p bioprosthetic heart valve, chronic kidney disease admitted on 3/7/2024 for further evaluation of altered mental status.      Patient presented to the ED hemodynamically stable. SpO2 93% on 5 L NC (chronically on 3L O2 baseline). Temperature 98.9. Per EMS and patient's wife, patient's health has been declining over past three months. He experienced a fall 03/04/2024 for which he was seen in the ED and had a negative head CT. Wife states patient sat down in a chair on afternoon (03/06/2024) and essentially hasn't gotten up since. He was noted to be non-verbal upon EMS arrival, although he responded to pain. Upon arrival to [facility] ED, patient open eyes to voice and will respond verbally with \"yes or no.\" Wife notes while patient has had difficulty communicating since his nodule in his mouth has grown larger, he does usually communicate regularly with her at home. Patient is very lethargic upon examination and unable to provide his own history. Labs in the ED generally unremarkable with no leukocytosis. Procalcitonin 0.79 " (improved from 21.39 from 02/19 hospital stay). Lactate WNL (1.9). Blood cultures obtained .  No significant electrolyte abnormality or worsening of his chronic anemia (hgb 11.1 and baseline 9.0). Glucose 116. . VBGs notable for a pH 7.36, CO2 67, Bicarb 38. UA and urine drug screen obtained and negative for infection. Influenza, COVID and RSV pending. Ketone quantitative <0.18. Urine drug screen negative. CXR demonstrated stable size of cardiac silhouette status post median sternotomy and aortic valve replacement. Slight improvement in aeration in the right lower lobe with decreased conspicuity of airspace disease since prior exam. Persistent small bilateral pleural effusions, right larger than left. No pneumothorax. CT negative for acute intracranial pathology.      Of note, patient has history of encephalopathy during several past hospital stays, most recently for sepsis secondary to bilateral pneumonia 02/19/2024 - 02/28/2024.      Failure to thrive  Goals of care  7th admission since 08/2023 with continued ongoing decline at home.   TCU has been recommended multiple times and at every discharge, however, patient continues to decline.  Palliative care has been included in past hospitalizations to discuss goals of care.   Patient wants restorative care including full code. Wife this admit stating she is unable to manage patient at home and is agreeable to TCU, should patient be appropriate for TCU placement.   Palliative care consult again requested and conference 3/11 with plans for hospice.   Initially considered US biopsy of cervical LN but then family changed their mind.      Severe aortic stenosis s/p bioprosthetic aortic valve  Hypertension  Hyperlipidemia  Elevated BNP  Chronically elevated troponin  Hx of orthostatic hypotension  IVF for orthostatic BPs 3/9   2/21/2024 ECHO: Difficult secondary to patient intolerant to the study.  LV normal.  Right ventricle not well-visualized however global  "systolic function is mildly reduced.  Aortic valve replacement with 25 mm [employer]'s trifecta bioprosthetic valve.  On Florinef 0.1 mg (1/2 tablet daily )      Acute encephalopathy   Suspect dementia with occasional delirium and agitation   Difficult to establish baseline of mental status.   Patient has history of encephalopathy during several past hospital stays, most recently for sepsis secondary to bilateral pneumonia 02/19/2024 - 02/28/2024.   CT head upon admission negative for acute intracranial pathology.   Brain MRI obtained 01/2024 and demonstrated no acute intracranial process. Mild dural thickening and enhancement along the left lateral frontal lobe, nonspecific, but likely unchanged compared to prior brain MRI 06/28/2023. Generalized brain atrophy and presumed microvascular ischemic changes as detailed above.  3/8 resumed on Cymbalta 40 mg daily  3/8 resumed on Neurontin 300 mg at at bedtime  PRN PO Seroquel available and IV/IM.  Continue to monitor with morning labs (CBC, CMP, lactate, VBG).      Acute on chronic hypoxic respiratory failure on 3L O2 at home  Chronic obstructive pulmonary disease (COPD)  Recent hospitalization fro sepsis secondary to bilateral pneumonia   No significant wheezing recently, low suspicion of acute COPD exacerbation at this time. Required 5L O2 upon arrival.   3/10 currently on 2.5 L.  History of COPD  Continue PTA inhalers once med rec completed.      FDG avid soft tissue nodule in the right tongue base, needs follow-up   Concern for squamous cell carcinoma; also concern if causing aspiration and PNA.  Appointment with ENT on 1/11 was cancelled to work up the lung first.  Wife planned to make appointment with ENT several months ago.   ENT  2/23/24: \"Interval PET imaging with diagnostic CT neck with contrast for further characterization would be necessary and recommended before considering biopsy.   Ultimately, direct laryngoscopy with biopsy in the operating room would be " "necessary to obtain tissue diagnosis from this site. Would not recommend any further imaging or surgical intervention until determination from medical oncology appropriateness for any chemotherapeutic management. Findings in the base of tongue would not be treated surgically at this time. \"    3/11 initially plans for biopsy of LN and now changed in plans from family. They want to focus on comfort and transition to hospice      Dysphagia, modified diet  Suspect secondary to soft tissue nodule noted above   Continue dysphagia diet.  Speech consulted in hospital last visit; recommended slightly thick diet.   Encourage oral intake.  Ensure enlive between meals.      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 Planned for pet scan and biopsy of nodule on 1/9 was cancelled by the patient.  Cytology on pleural fluid 2/20, negative for malignancy; acute inflammation noted.  Outpatient follow-up with oncology, primary clinic provider to review/arrange.  Patient has increasing pulmonary nodules as well.      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     Hx of VTE/pulmonary embolism (PE)   History of subsegmental pulmonary embolism (PE) on 11/25/23  Not on anticoagulation due to GI bleed. Hx of IVC filter in the past, not clear if it was removed.  Follow-up with primary clinic provider upon discharge.      Paroxysmal atrial fibrillation  Not on anticoagulation due to anemia thought to be due to a GI bleed. Has history of angioextasia and diverticulosis.   Telemetry.      Anemia, normocytic  Thrombocytopenia, resolved  No worsening of patient's chronic anemia. Hgb 11.1 and baseline 9.0.   Continue to monitor with morning labs.       Benign prostatic hypertrophy (BPH)  Continue PTA tamsulosin (Flomax) once med rec complete.   Monitor for urinary " retention, bladder scan PRN.      History of GI bleed  Gastroesophageal reflux disease (GERD)  Anemia  Continue PTA famotidine, allergic to PPI.  Monitor hemoglobin as above.     Chronic kidney disease (CKD) stage 2  Baseline creatinine around 0.8, 1.09 on admission.   Start maintenance IV fluids; suspect due to dehydration and limited PO intake; decreased from 100 mL/hr to 50 mL/hr.   Encourage PO intake.   Avoid nephrotoxic agents as able.      Depression/Anxiety/Pain  PTA Duloxetine and gabapentin doses reduced per family request last hospitalization.   Continued on duloxetine and gabapentin     Deep Tissue Pressure Injury to sacrum and bilateral buttocks  WOC consulted, appreciate the cares.      Weakness and deconditioning   Physical therapy (PT), CM/SW consulted, appreciate the cares.  Patient's wife believe's patient would now be served better in TCU versus home as she is unable to care for patient independently.   Last hospitalization, however, patient refused repeatedly.      Diet: Combination Diet Regular Diet Adult; Mechanical/Dental Soft Diet; Slightly Thick (level 1)  Snacks/Supplements Adult: Ensure Enlive; Between Meals  Room Service    DVT Prophylaxis: Pneumatic Compression Devices  Suazo Catheter: Not present  Lines: None     Cardiac Monitoring: ACTIVE order. Indication: Atrial Fibrillation  Code Status: Full Code          Clinically Significant Risk Factors              # Hypoalbuminemia: Lowest albumin = 3 g/dL at 3/7/2024 12:56 PM, will monitor as appropriate     # Hypertension: Noted on problem list    # Chronic heart failure with preserved ejection fraction: heart failure noted on problem list and last echo with EF >50%        # Severe Malnutrition: based on nutrition assessment      # Financial/Environmental Concerns:    # COPD: noted on problem list        Disposition Plan      Expected Discharge Date: 03/13/2024                    INOCENCIA HALL MD  Hospitalist Service  [facility]  [facility]  Securely message with [employer] (more info)  Text page via [employer] Paging/Directory   ______________________________________________________________________      Physical Exam   Vital Signs: Temp: 97.8  F (36.6  C) Temp src: Oral BP: 109/64 Pulse: 96   Resp: 18 SpO2: 99 % O2 Device: Nasal cannula Oxygen Delivery: 3 LPM  Weight: 133 lbs 6.05 oz    General Appearance:  Patient is extremely weak.   Respiratory: Decreased BS bilaterally   Cardiovascular: Regular rate with no gallop or rub  GI: + BS, soft, non tender  Skin: no overt edema       Medical Decision Making       55 MINUTES SPENT BY ME on the date of service doing chart review, history, exam, documentation & further activities per the note.    Multiple discussion regarding goals of care and plans. Ultimately his son and wife want to pursue hospice. Patient in the room but again appears to have limitations for challenging conversations.   Discussion with palliative, nursing and patient as able.           No results found for this or any previous visit (from the past 24 hour(s)).

## 2024-03-12 NOTE — PLAN OF CARE
Goal Outcome Evaluation:      Plan of Care Reviewed With: patient, spouse    Summary: Altered mental status, Adult failure to thrive.     DATE&TIME:3/12/24 [id number]                                                                                           Cognitive Concerns/ Orientation : Pt is oriented to self, confused, forgetful. Calm and cooperative, garbled speech.   BEHAVIOR & AGGRESSION TOOL COLOR:Green                                                              ABNL VS/O2: VSS, O2 95% on 3L NC (baseline is 3L NC)  MOBILITY: Assist x1 with walker and gait belt, walks slowly, high fall risk. Reposition q2h. Sat on chair x2.   PAIN MANAGMENT: Denies pain  DIET: Regular, soft and bite sized. Slightly thick liquids. Poor appetite  BOWEL/BLADDER: Incontinent of bladder at times. Voided in bathroom x2. Continent of bowel, BM x1.   DRAIN/DEVICES: IV SL  TELEMETRY RHYTHM: Afib with RVR and BBB, tele discontinued.   SKIN: Scattered bruises and scabs. WOC applied new dressing to Lt hand and coccyx. Lt elbow dressing CDI. Coccyx pressure ulcer is nonblanchable redness/black.   TESTS/PROCEDURES: none  D/C DATE: Discharge pending placement with hospice, SW following.   OTHER IMPORTANT INFO: Comfort care order placed at 1525. Vitals BID.

## 2024-03-12 NOTE — PROGRESS NOTES
Care Transitions  Writer is aware of the hospice consult for placement.   Unable to meet with spouse today but will meet with her tomorrow.   Dr Woodruff reports patient is a .  Writer has placed a call to Amaya SMITH at [facility] to ask if patient is registered with [facility].  If he is he automatically can receive [facility] funding for an entire SNF  Stay if he goes to a VA contract home.

## 2024-03-13 NOTE — PLAN OF CARE
DATE & TIME: 3/12/24 8445-7794    Cognitive Concerns/ Orientation : Pt oriented to self, confused. Forgetful. Garbled speech. Calm and cooperative.   BEHAVIOR & AGGRESSION TOOL COLOR: Green   ABNL VS/O2: VSS on 3L NC  MOBILITY: Assist x1 with gait belt and walker, fall risk. Reposition q2h.   PAIN MANAGMENT: Denies  DIET: Regular, soft and bite sized. Slightly thick liquids  BOWEL/BLADDER: Incontinent at times.   ABNL LAB/BG: Hgb 9.1  DRAIN/DEVICES: IV SL  SKIN: Scattered bruises and scabs. Dressing on left elbow and hand, CDI. Dressing on coccyx for pressure ulcer, CDI.  D/C DATE: Discharge pending placement with hospice, SW following  OTHER IMPORTANT INFO: Comfort cares, vitals BID. Pt still receiving all medications.

## 2024-03-13 NOTE — CONSULTS
Care Management Initial Consult    General Information  Assessment completed with:  (wife; attempted with son Mikal), wife neetu and attempted with Mikal  Type of CM/SW Visit: Initial Assessment    Primary Care Provider verified and updated as needed:     Readmission within the last 30 days:           Advance Care Planning:            Communication Assessment  Patient's communication style: spoken language (English or Bilingual)    Hearing Difficulty or Deaf: yes   Wear Glasses or Blind: yes    Cognitive  Cognitive/Neuro/Behavioral: .WDL except, orientation, speech, mood/behavior  Level of Consciousness: alert, confused  Arousal Level: arouses to voice, arouses to touch/gentle shaking, opens eyes spontaneously  Orientation: disoriented to, place, time, situation  Mood/Behavior: cooperative, calm  Best Language: 0 - No aphasia  Speech: garbled, incoherent, spontaneous    Living Environment:   People in home: spouse     Current living Arrangements: house      Able to return to prior arrangements:         Family/Social Support:  Care provided by:    Provides care for:                  Description of Support System:           Current Resources:   Patient receiving home care services:       Community Resources:    Equipment currently used at home: grab bar, tub/shower, walker, rolling  Supplies currently used at home:      Employment/Financial:  Employment Status:          Financial Concerns:             Does the patient's insurance plan have a 3 day qualifying hospital stay waiver?  No    Lifestyle & Psychosocial Needs:  Social Determinants of Health     Food Insecurity: No Food Insecurity (5/12/2020)    Hunger Vital Sign     Worried About Running Out of Food in the Last Year: Never true     Ran Out of Food in the Last Year: Never true   Depression: Not at risk (2/15/2024)    PHQ-2     PHQ-2 Score: 0   Housing Stability: Not on file   Tobacco Use: Medium Risk (2/13/2024)    Patient History     Smoking Tobacco Use: Former      Smokeless Tobacco Use: Never     Passive Exposure: Not on file   Financial Resource Strain: Low Risk  (5/12/2020)    Overall Financial Resource Strain (CARDIA)     Difficulty of Paying Living Expenses: Not hard at all   Alcohol Use: Not on file   Transportation Needs: No Transportation Needs (5/12/2020)    PRAPARE - Transportation     Lack of Transportation (Medical): No     Lack of Transportation (Non-Medical): No   Physical Activity: Inactive (5/12/2020)    Exercise Vital Sign     Days of Exercise per Week: 0 days     Minutes of Exercise per Session: 0 min   Interpersonal Safety: Not At Risk (5/12/2020)    Humiliation, Afraid, Rape, and Kick questionnaire     Fear of Current or Ex-Partner: No     Emotionally Abused: No     Physically Abused: No     Sexually Abused: No   Stress: No Stress Concern Present (5/12/2020)    [employer] - Occupational Stress Questionnaire     Feeling of Stress : Not at all   Social Connections: Moderately Isolated (5/12/2020)    Social Connection and Isolation Panel [NHANES]     Frequency of Communication with Friends and Family: Twice a week     Frequency of Social Gatherings with Friends and Family: Twice a week     Attends Caodaism Services: Never     Active Member of Clubs or Organizations: No     Attends Club or Organization Meetings: Never     Marital Status:        Functional Status:  Prior to admission patient needed assistance:   Dependent ADLs:: Independent          Mental Health Status:          Chemical Dependency Status:                Values/Beliefs:  Spiritual, Cultural Beliefs, Caodaism Practices, Values that affect care:                 Additional Information:  Pt is a 89 year old male admitted to hospital with concerns of altered mental status.    Writer able to see that  has stated in her note that pt is comfort care/ hospice.    Writer spoke with unit  Kristan who states she received notification from [facility]  stating that pt is eligible for SNF coverage with or without hospice as he is service disabled therefore he can attend a VA contracted SNF. Unit  states she was provided the list of contracted VA facilities and provided it to writer.    Writer spoke with bedside RN who states that pt is disoriented. Writer placed phone call to pt's wife twice. No answer. Writer left voicemail asking for call back.    Writer stopped by pt's room after 330pm. Pt's wife states that she is wondering if pt is needing to go hospice or if TCU is an option. Writer informed her that writer is able to see that per palliative discussion and  note the decision was made for hospice. Writer informed her that VA has ability for contacted SNF which pt could attend on hospice if family would like. Roberta states she would like writer to call her Children to discuss further. She is aware writer can drop off a list at bedside of VA contract facilities. Not much information able to be gathered from pt's spouse as she states wants writer to talk to pt children. Writer will obtain information for initial from pt's son Mikal.    Writer called pt's son Mikal twice. Both times phone rings and hangs up. Writer called Mikal a third time and it went to voicemail. Writer left voicemail asking for a call back. Writer unable to obtain information regarding pt's baseline and home life as unable to connect with Mikal.    List of facilities dropped off at bedside for pt/wife.    Jayda Herr, BERYL  Social Work  [facility]

## 2024-03-13 NOTE — PROGRESS NOTES
"[facility]    Medicine Progress Note - Hospitalist Service    Date of Admission:  3/7/2024    Interval History:   Discussion with the family yesterday regarding goals of care.   This a.m. asked patient if he remembered seeing provider and he did not recall ever meeting provider.  When asking patient where he is he talked about the sheet rock and asbestos.  He is hard of hearing and had to talk very loudly.    Assessment & Plan   Hermilo Velasquez is a 89 year old male with a past medical history significant for lung cancer s/p wedge resection, recurrent c.diff, afib, chronic obstructive pulmonary disease, pulmonary embolism, aortic stenosis s/p bioprosthetic heart valve, chronic kidney disease admitted on 3/7/2024 for further evaluation of altered mental status.      Patient presented to the ED hemodynamically stable. SpO2 93% on 5 L NC (chronically on 3L O2 baseline). Temperature 98.9. Per EMS and patient's wife, patient's health has been declining over past three months. He experienced a fall 03/04/2024 for which he was seen in the ED and had a negative head CT. Wife states patient sat down in a chair on afternoon (03/06/2024) and essentially hasn't gotten up since. He was noted to be non-verbal upon EMS arrival, although he responded to pain. Upon arrival to [facility] ED, patient open eyes to voice and will respond verbally with \"yes or no.\" Wife notes while patient has had difficulty communicating since his nodule in his mouth has grown larger, he does usually communicate regularly with her at home. Patient is very lethargic upon examination and unable to provide his own history. Labs in the ED generally unremarkable with no leukocytosis. Procalcitonin 0.79 (improved from 21.39 from 02/19 hospital stay). Lactate WNL (1.9). Blood cultures obtained .  No significant electrolyte abnormality or worsening of his chronic anemia (hgb 11.1 and baseline 9.0). Glucose 116. . VBGs notable " for a pH 7.36, CO2 67, Bicarb 38. UA and urine drug screen obtained and negative for infection. Influenza, COVID and RSV pending. Ketone quantitative <0.18. Urine drug screen negative. CXR demonstrated stable size of cardiac silhouette status post median sternotomy and aortic valve replacement. Slight improvement in aeration in the right lower lobe with decreased conspicuity of airspace disease since prior exam. Persistent small bilateral pleural effusions, right larger than left. No pneumothorax. CT negative for acute intracranial pathology.      Of note, patient has history of encephalopathy during several past hospital stays, most recently for sepsis secondary to bilateral pneumonia 02/19/2024 - 02/28/2024.      Failure to thrive  Goals of care  7th admission since 08/2023 with continued ongoing decline at home.   TCU has been recommended multiple times and at every discharge, however, patient continues to decline.  Palliative care has been included in past hospitalizations to discuss goals of care.   Patient wants restorative care including full code. Wife this admit stating she is unable to manage patient at home and is agreeable to TCU, should patient be appropriate for TCU placement.   Palliative care consult again requested and conference 3/11 with plans for hospice.   Initially considered US biopsy of cervical LN but then family changed their mind.   3/11 lengthy discussion with the family including his son Mikal and wife Roberta.  Family ultimately agreed to hospice     Severe aortic stenosis s/p bioprosthetic aortic valve  Hypertension  Hyperlipidemia  Elevated BNP  Chronically elevated troponin  Hx of orthostatic hypotension  IVF for orthostatic BPs 3/9   2/21/2024 ECHO: Difficult secondary to patient intolerant to the study.  LV normal.  Right ventricle not well-visualized however global systolic function is mildly reduced.  Aortic valve replacement with 25 mm [employer] trifecta bioprosthetic valve.  On  "Florinef 0.1 mg (1/2 tablet daily )      Acute encephalopathy   Suspect dementia with occasional delirium and agitation   Difficult to establish baseline of mental status.   Patient has history of encephalopathy during several past hospital stays, most recently for sepsis secondary to bilateral pneumonia 02/19/2024 - 02/28/2024.   CT head upon admission negative for acute intracranial pathology.   Brain MRI obtained 01/2024 and demonstrated no acute intracranial process. Mild dural thickening and enhancement along the left lateral frontal lobe, nonspecific, but likely unchanged compared to prior brain MRI 06/28/2023. Generalized brain atrophy and presumed microvascular ischemic changes as detailed above.  3/8 resumed on Cymbalta 40 mg daily  3/8 resumed on Neurontin 300 mg at at bedtime  PRN PO Seroquel available and IV/IM.  Continue to monitor with morning labs (CBC, CMP, lactate, VBG).      Acute on chronic hypoxic respiratory failure on 3L O2 at home  Chronic obstructive pulmonary disease (COPD)  Recent hospitalization fro sepsis secondary to bilateral pneumonia   No significant wheezing recently, low suspicion of acute COPD exacerbation at this time. Required 5L O2 upon arrival.   3/10 currently on 2.5 L.  History of COPD  Continue PTA inhalers once med rec completed.      FDG avid soft tissue nodule in the right tongue base, needs follow-up   Concern for squamous cell carcinoma; also concern if causing aspiration and PNA.  Appointment with ENT on 1/11 was cancelled to work up the lung first.  Wife planned to make appointment with ENT several months ago.   ENT  2/23/24: \"Interval PET imaging with diagnostic CT neck with contrast for further characterization would be necessary and recommended before considering biopsy.   Ultimately, direct laryngoscopy with biopsy in the operating room would be necessary to obtain tissue diagnosis from this site. Would not recommend any further imaging or surgical intervention " "until determination from medical oncology appropriateness for any chemotherapeutic management. Findings in the base of tongue would not be treated surgically at this time. \"    3/11 initially plans for biopsy of LN and now changed in plans from family.   3/12 they want to focus on comfort and transition to hospice no plans for biopsy.     Dysphagia, modified diet  Suspect secondary to soft tissue nodule noted above   Continue dysphagia diet.  Speech consulted in hospital last visit; recommended slightly thick diet.   Encourage oral intake.  Ensure enlive between meals.      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 Planned for pet scan and biopsy of nodule on 1/9 was cancelled by the patient.  Cytology on pleural fluid 2/20, negative for malignancy; acute inflammation noted.  Outpatient follow-up with oncology, primary clinic provider to review/arrange initially planned but now he will go transition into hospice  Patient has increasing pulmonary nodules as well. 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     Hx of VTE/pulmonary embolism (PE)   History of subsegmental pulmonary embolism (PE) on 11/25/23  Not on anticoagulation due to GI bleed. Hx of IVC filter in the past, not clear if it was removed.  Follow-up with primary clinic provider upon discharge.      Paroxysmal atrial fibrillation  Not on anticoagulation due to anemia thought to be due to a GI bleed. Has history of angioextasia and diverticulosis.   Telemetry.      Anemia, normocytic  Thrombocytopenia, resolved  No worsening of patient's chronic anemia. Hgb 11.1 and baseline 9.0.   Continue to monitor with morning labs.       Benign prostatic hypertrophy (BPH)  Continue PTA tamsulosin (Flomax) once med rec complete. "   Monitor for urinary retention, bladder scan PRN.      History of GI bleed  Gastroesophageal reflux disease (GERD)  Anemia  Continue PTA famotidine, allergic to PPI.  Monitor hemoglobin as above.  Reviewed chart regarding GI bleeds.  He has AVMs and was not tolerant to anticoagulation.  He was initially on Eliquis because of an incidental PE found on imaging.  Unfortunately he developed GI bleeds and with evaluation found to have AVMs and risk for further bleeding so he could not tolerate anticoagulation  This will place him at high risk again for recurrent thrombotic complications especially in the setting of his cancer.  This adds to why he is very appropriate for hospice.     Chronic kidney disease (CKD) stage 2  Baseline creatinine around 0.8, 1.09 on admission.   Start maintenance IV fluids; suspect due to dehydration and limited PO intake; decreased from 100 mL/hr to 50 mL/hr.   Encourage PO intake.   Avoid nephrotoxic agents as able.      Depression/Anxiety/Pain  PTA Duloxetine and gabapentin doses reduced per family request last hospitalization.   Continued on duloxetine and gabapentin     Deep Tissue Pressure Injury to sacrum and bilateral buttocks  WOC consulted, appreciate the cares.      Weakness and deconditioning   Physical therapy (PT), CM/SW consulted, appreciate the cares.  Patient's wife believe's patient would now be served better in TCU versus home as she is unable to care for patient independently.   Last hospitalization, however, patient refused repeatedly.   3/12 attempted to talk with the patient about going to a place where he could be cared for as his wife cannot take care of him . He does not really appear to have much insight     Sacral ulcer/pressure sore  Reviewed walk findings of his sacral decubitus area.  He has a significant large pressure ulcer on his sacral region and at high risk for breakdown, infection and further complication  This again adds to the indication for  hospice  High chance that this will progress      Diet: Combination Diet Regular Diet Adult; Mechanical/Dental Soft Diet; Slightly Thick (level 1)  Snacks/Supplements Adult: Ensure Enlive; Between Meals  Room Service    DVT Prophylaxis: Pneumatic Compression Devices  Suazo Catheter: Not present  Lines: None     Cardiac Monitoring: ACTIVE order. Indication: Atrial Fibrillation  Code Status: Full Code          Clinically Significant Risk Factors              # Hypoalbuminemia: Lowest albumin = 3 g/dL at 3/7/2024 12:56 PM, will monitor as appropriate   # Thrombocytopenia: Lowest platelets = 96 in last 2 days, will monitor for bleeding   # Hypertension: Noted on problem list    # Chronic heart failure with preserved ejection fraction: heart failure noted on problem list and last echo with EF >50%        # Severe Malnutrition: based on nutrition assessment      # Financial/Environmental Concerns:    # COPD: noted on problem list        Disposition Plan      Expected Discharge Date: 03/14/2024    Discharge Delays: Comfort Care/Hospice  *Medically Ready for Discharge  Placement - LTC                INOCENCIA HALL MD  Hospitalist Service  [facility]  Securely message with [employer] (more info)  Text page via [employer] Paging/Directory   ______________________________________________________________________      Physical Exam   Vital Signs: Temp: 97.6  F (36.4  C) Temp src: Oral BP: 113/64 Pulse: 98   Resp: 16 SpO2: 95 % O2 Device: Nasal cannula Oxygen Delivery: 3 LPM  Weight: 131 lbs 2.78 oz    General Appearance:  Patient is extremely weak.  He is confused about potentially where he is.  Respiratory: Decreased BS bilaterally   Cardiovascular: Regular rate with no gallop or rub  GI: + BS, soft, non tender  Skin: no overt edema       Medical Decision Making       49 MINUTES SPENT BY ME on the date of service doing chart review, history, exam, documentation & further activities per the note.    Multiple  discussions again regarding coordination of his care which will be hopefully to hospice soon as a bed can be obtained.  He has [employer] benefits.  Hopefully this will give him more opportunity        No results found for this or any previous visit (from the past 24 hour(s)).

## 2024-03-13 NOTE — PLAN OF CARE
Goal Outcome Evaluation:      Plan of Care Reviewed With: patient, spouse    SUMMARY: Altered mental status, Adult failure to thrive.     DATE & TIME: 3/13/24 4222-1182   Cognitive Concerns/ Orientation : Pt oriented to self, confused. Forgetful. Garbled speech. Calm and cooperative.   BEHAVIOR & AGGRESSION TOOL COLOR: Green   ABNL VS/O2: VSS on 3L NC  MOBILITY: Assist x1 with gait belt and walker, fall risk. Reposition q2h.   PAIN MANAGMENT: Denies  DIET: Regular, soft and bite sized. Slightly thick liquids, fair appetite   BOWEL/BLADDER: Incontinent at times.   ABNL LAB/BG: no labs drawn today   DRAIN/DEVICES: IV SL  SKIN: Scattered bruises and scabs. Dressing on left elbow and hand, CDI. Applied new dressing to coccyx per plan of care, CDI.  D/C DATE: Discharge pending placement with hospice, SW following  OTHER IMPORTANT INFO: Comfort cares, vitals BID. Pt still receiving all medications.

## 2024-03-14 NOTE — TELEPHONE ENCOUNTER
Pt 's wife and daughter were called. Pt is currently at the hospital. Triage advised family to discuss concerns to MD and CC at the hospital. They verbalized agreement with plan.

## 2024-03-14 NOTE — PROGRESS NOTES
"[facility]    Medicine Progress Note - Hospitalist Service    Date of Admission:  3/7/2024    Interval History:   Patient lying in bed today. Not overtly engaged in conversation but extreme [place]  Plans for hospice and working to arrange discharge     Assessment & Plan   Hermilo Velasquez is a 89 year old male with a past medical history significant for lung cancer s/p wedge resection, recurrent c.diff, afib, chronic obstructive pulmonary disease, pulmonary embolism, aortic stenosis s/p bioprosthetic heart valve, chronic kidney disease admitted on 3/7/2024 for further evaluation of altered mental status.      Patient presented to the ED hemodynamically stable. SpO2 93% on 5 L NC (chronically on 3L O2 baseline). Temperature 98.9. Per EMS and patient's wife, patient's health has been declining over past three months. He experienced a fall 03/04/2024 for which he was seen in the ED and had a negative head CT. Wife states patient sat down in a chair on afternoon (03/06/2024) and essentially hasn't gotten up since. He was noted to be non-verbal upon EMS arrival, although he responded to pain. Upon arrival to [facility] ED, patient open eyes to voice and will respond verbally with \"yes or no.\" Wife notes while patient has had difficulty communicating since his nodule in his mouth has grown larger, he does usually communicate regularly with her at home. Patient is very lethargic upon examination and unable to provide his own history. Labs in the ED generally unremarkable with no leukocytosis. Procalcitonin 0.79 (improved from 21.39 from 02/19 hospital stay). Lactate WNL (1.9). Blood cultures obtained .  No significant electrolyte abnormality or worsening of his chronic anemia (hgb 11.1 and baseline 9.0). Glucose 116. . VBGs notable for a pH 7.36, CO2 67, Bicarb 38. UA and urine drug screen obtained and negative for infection. Influenza, COVID and RSV pending. Ketone quantitative <0.18. Urine " drug screen negative. CXR demonstrated stable size of cardiac silhouette status post median sternotomy and aortic valve replacement. Slight improvement in aeration in the right lower lobe with decreased conspicuity of airspace disease since prior exam. Persistent small bilateral pleural effusions, right larger than left. No pneumothorax. CT negative for acute intracranial pathology.      Of note, patient has history of encephalopathy during several past hospital stays, most recently for sepsis secondary to bilateral pneumonia 02/19/2024 - 02/28/2024.      Failure to thrive  Goals of care  7th admission since 08/2023 with continued ongoing decline at home.   TCU has been recommended multiple times and at every discharge, however, patient continues to decline.  Palliative care has been included in past hospitalizations to discuss goals of care.   Patient wants restorative care including full code. Wife this admit stating she is unable to manage patient at home and is agreeable to TCU, should patient be appropriate for TCU placement.   Palliative care consult again requested and conference 3/11 with plans for hospice.   Initially considered US biopsy of cervical LN but then family changed their mind.   3/11 lengthy discussion with the family including his son Mikal and wife Roberta.  Family ultimately agreed to hospice     Severe aortic stenosis s/p bioprosthetic aortic valve  Hypertension  Hyperlipidemia  Elevated BNP  Chronically elevated troponin  Hx of orthostatic hypotension  IVF for orthostatic BPs 3/9   2/21/2024 ECHO: Difficult secondary to patient intolerant to the study.  LV normal.  Right ventricle not well-visualized however global systolic function is mildly reduced.  Aortic valve replacement with 25 mm [employer] trifecta bioprosthetic valve.  On Florinef 0.1 mg (1/2 tablet daily )      Acute encephalopathy   Suspect dementia with occasional delirium and agitation   Difficult to establish baseline of mental  "status.   Patient has history of encephalopathy during several past hospital stays, most recently for sepsis secondary to bilateral pneumonia 02/19/2024 - 02/28/2024.   CT head upon admission negative for acute intracranial pathology.   Brain MRI obtained 01/2024 and demonstrated no acute intracranial process. Mild dural thickening and enhancement along the left lateral frontal lobe, nonspecific, but likely unchanged compared to prior brain MRI 06/28/2023. Generalized brain atrophy and presumed microvascular ischemic changes as detailed above.  3/8 resumed on Cymbalta 40 mg daily  3/8 resumed on Neurontin 300 mg at at bedtime  PRN PO Seroquel available and IV/IM.  Continue to monitor with morning labs (CBC, CMP, lactate, VBG).      Acute on chronic hypoxic respiratory failure on 3L O2 at home  Chronic obstructive pulmonary disease (COPD)  Recent hospitalization fro sepsis secondary to bilateral pneumonia   No significant wheezing recently, low suspicion of acute COPD exacerbation at this time. Required 5L O2 upon arrival.   3/10 currently on 2.5 L.  History of COPD  Continue PTA inhalers once med rec completed.      FDG avid soft tissue nodule in the right tongue base, needs follow-up   Concern for squamous cell carcinoma; also concern if causing aspiration and PNA.  Appointment with ENT on 1/11 was cancelled to work up the lung first.  Wife planned to make appointment with ENT several months ago.   ENT  2/23/24: \"Interval PET imaging with diagnostic CT neck with contrast for further characterization would be necessary and recommended before considering biopsy.   Ultimately, direct laryngoscopy with biopsy in the operating room would be necessary to obtain tissue diagnosis from this site. Would not recommend any further imaging or surgical intervention until determination from medical oncology appropriateness for any chemotherapeutic management. Findings in the base of tongue would not be treated surgically at this " "time. \"    3/11 initially plans for biopsy of LN and now changed in plans from family.   3/12 they want to focus on comfort and transition to hospice no plans for biopsy.     Dysphagia, modified diet  Suspect secondary to soft tissue nodule noted above   Continue dysphagia diet.  Speech consulted in hospital last visit; recommended slightly thick diet.   Encourage oral intake.  Ensure enlive between meals.      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 Planned for pet scan and biopsy of nodule on 1/9 was cancelled by the patient.  Cytology on pleural fluid 2/20, negative for malignancy; acute inflammation noted.  Outpatient follow-up with oncology, primary clinic provider to review/arrange initially planned but now he will go transition into hospice  Patient has increasing pulmonary nodules as well. 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     Hx of VTE/pulmonary embolism (PE)   History of subsegmental pulmonary embolism (PE) on 11/25/23  Not on anticoagulation due to GI bleed. Hx of IVC filter in the past, not clear if it was removed.  3/13 Risk for future events as not anticoagulated      Paroxysmal atrial fibrillation  Not on anticoagulation due to anemia thought to be due to a GI bleed. Has history of angioextasia and diverticulosis.   Telemetry.      Anemia, normocytic  Thrombocytopenia, resolved  No worsening of patient's chronic anemia. Hgb 11.1 and baseline 9.0.   Continue to monitor with morning labs.       Benign prostatic hypertrophy (BPH)  Continue PTA tamsulosin (Flomax) once med rec complete.   Monitor for urinary retention, bladder scan PRN.      History of GI bleed  Gastroesophageal reflux disease (GERD)  Anemia  Continue PTA famotidine, allergic to PPI.  Monitor " hemoglobin as above.  Reviewed chart regarding GI bleeds.  He has AVMs and was not tolerant to anticoagulation.  He was initially on Eliquis because of an incidental PE found on imaging.  Unfortunately he developed GI bleeds and with evaluation found to have AVMs and risk for further bleeding so he could not tolerate anticoagulation  This will place him at high risk again for recurrent thrombotic complications especially in the setting of his cancer.  This adds to why he is very appropriate for hospice.     Chronic kidney disease (CKD) stage 2  Baseline creatinine around 0.8, 1.09 on admission.   Start maintenance IV fluids; suspect due to dehydration and limited PO intake; decreased from 100 mL/hr to 50 mL/hr.   Encourage PO intake.   Avoid nephrotoxic agents as able.      Depression/Anxiety/Pain  PTA Duloxetine and gabapentin doses reduced per family request last hospitalization.   Continued on duloxetine and gabapentin     Deep Tissue Pressure Injury to sacrum and bilateral buttocks  WOC consulted, appreciate the cares.   He has a significant wound on his sacrum and bilateral buttocks.   Pictures in EPIC  Risk of breakdown      Weakness and deconditioning   Physical therapy (PT), CM/SW consulted, appreciate the cares.  Patient's wife believe's patient would now be served better in TCU versus home as she is unable to care for patient independently.   Last hospitalization, however, patient refused repeatedly.   3/12 attempted to talk with the patient about going to a place where he could be cared for as his wife cannot take care of him . He does not really appear to have much insight     Sacral ulcer/pressure sore  Reviewed WOC findings of his sacral decubitus area.  He has a significant large pressure ulcer on his sacral region and at high risk for breakdown, infection and further complication  This again adds to the indication for hospice  High chance that this will progress    Currently awaiting hospice  placement.   He is ready to go anytime.       Diet: Combination Diet Regular Diet Adult; Mechanical/Dental Soft Diet; Slightly Thick (level 1)  Snacks/Supplements Adult: Ensure Enlive; Between Meals  Room Service    DVT Prophylaxis: Pneumatic Compression Devices  Suazo Catheter: Not present  Lines: None     Cardiac Monitoring: ACTIVE order. Indication: Atrial Fibrillation  Code Status: Full Code          Clinically Significant Risk Factors              # Hypoalbuminemia: Lowest albumin = 3 g/dL at 3/7/2024 12:56 PM, will monitor as appropriate   # Thrombocytopenia: Lowest platelets = 96 in last 2 days, will monitor for bleeding   # Hypertension: Noted on problem list    # Chronic heart failure with preserved ejection fraction: heart failure noted on problem list and last echo with EF >50%        # Severe Malnutrition: based on nutrition assessment      # Financial/Environmental Concerns:    # COPD: noted on problem list        Disposition Plan      Expected Discharge Date: 03/14/2024    Discharge Delays: Comfort Care/Hospice  *Medically Ready for Discharge  Placement - LTC                INOCENCIA HALL MD  Hospitalist Service  [facility]  Securely message with [employer] (more info)  Text page via [employer] Paging/Directory   ______________________________________________________________________      Physical Exam   Vital Signs: Temp: 98.7  F (37.1  C) Temp src: Axillary BP: 106/67 Pulse: 100   Resp: 18 SpO2: 100 % O2 Device: Nasal cannula Oxygen Delivery: 3 LPM  Weight: 131 lbs 2.78 oz    General Appearance:  Patient is extremely weak.  He is confused about potentially where he is.  Respiratory: Decreased BS bilaterally   Cardiovascular: Regular rate with no gallop or rub  GI: + BS, soft, non tender  Skin: no overt edema       Medical Decision Making       30 MINUTES SPENT BY ME on the date of service doing chart review, history, exam, documentation & further activities per the note.    Multiple  discussions again regarding coordination of his care which will be hopefully to hospice soon as a bed can be obtained.  He has VA benefits.  Hopefully this will give him more opportunity        No results found for this or any previous visit (from the past 24 hour(s)).

## 2024-03-14 NOTE — PLAN OF CARE
DATE & TIME: 3/13/24 1727-2282    Cognitive Concerns/ Orientation : Pt oriented to self, confused. Forgetful. Garbled speech.   BEHAVIOR & AGGRESSION TOOL COLOR: Green   ABNL VS/O2: VSS on 3L NC (baseline)  MOBILITY: Assist x1 with gait belt and walker, fall risk. Reposition q2h. Pt does occasionally refuse repositioning.  PAIN MANAGMENT: Denies  DIET: Regular, soft and bite size, slightly thick  BOWEL/BLADDER: Incontinent of bowel and bladder at times. Pt will use urinal with repositions occasionally.  DRAIN/DEVICES: IV SL  SKIN: Scattered bruises and scabs. Dressing on left elbow and hand, CDI. Coccyx dressing, CDI.  D/C DATE: Discharge pending placement with hospice, SW following.  OTHER IMPORTANT INFO: Comfort cares. Pt still receiving all medications

## 2024-03-14 NOTE — PROGRESS NOTES
Care Management Follow Up    Length of Stay (days): 7    Expected Discharge Date: 03/15/2024     Concerns to be Addressed: discharge planning     Patient plan of care discussed at interdisciplinary rounds: Yes    Anticipated Discharge Disposition: Hospice, Skilled Nursing Facility     Anticipated Discharge Services:    Anticipated Discharge DME:      Patient/family educated on Medicare website which has current facility and service quality ratings: yes  Education Provided on the Discharge Plan: Yes  Patient/Family in Agreement with the Plan: yes    Referrals Placed by CM/SW: Post Acute Facilities  Private pay costs discussed: Not applicable    Additional Information:  SW met with pt's spouse and daughter, Deloris. Reviewed hospice and discharge to a SNF. Both are in agreement. Reviewed pt has full coverage for room and board through the VA at a VA contracted facility. Provided the list of VA contracted facilities and reviewed with pt's spouse. She reports son is currently touring [place] and they would like to focus on facilities in [facility]. Daughter lives in [facility], but spouse was not agreeable to referrals there at this time. She does not want referrals to [facility]. Offered choice of hospice agency and reviewed hospice care. No preference for agency. Discussed waiting until a facility is confirmed before making a referral. Will discuss which agencies the SNF works with, and discuss choice then. Family in agreement. Referrals sent. SW following.     PIPO Chase, LICSW  [phone] Desk phone  [phone] Cell/text (Preferred)  [facility]

## 2024-03-14 NOTE — PLAN OF CARE
Goal Outcome Evaluation:  Cognitive Concerns/ Orientation : oriented to self, confused. Forgetful. Garbled speech, illogical at times.   BEHAVIOR & AGGRESSION TOOL COLOR: Green   ABNL VS/O2: VSS on 3L NC (baseline)  MOBILITY: Assist x1 with gait belt and walker, fall risk. Reposition q2h. Not out of bed this shift  PAIN MANAGMENT: Denies  DIET: Regular, soft and bite size, slightly thick  BOWEL/BLADDER: Incontinent of bowel and bladder at times. Pt will use urinal with repositions occasionally.  DRAIN/DEVICES: IV SL  SKIN: Scattered bruises and scabs. Dressing on left elbow and hand, CDI. Coccyx dressing, changed.  D/C DATE: Discharge pending placement with hospice, SW following.  OTHER IMPORTANT INFO: slept between cares, feeding assistance provided. Family at the bedside this afternooon

## 2024-03-15 NOTE — PROGRESS NOTES
Clinic Care Coordination Contact  Care Coordination Clinician Chart Review    Situation: Patient chart reviewed by care coordinator.    Background: Clinic Care Coordination Referral received from inpatient care team for transition handoff communication following hospital admission.    Assessment: Upon chart review, patient is not a candidate for Primary Care Clinic Care Coordination enrollment due to reason stated below:  Patient enrolled into hospice.    Plan/Recommendations: Clinic Care Coordination Referral/order cancelled. RN/SW CC will perform no further monitoring/outreaches at this time and will remain available as needed. If new needs arise, a new Care Coordination Referral may be placed.    Louisa Shelley,  [facility]  Clinic Care Coordinator  [facility]  [phone]  [email or web address]

## 2024-03-15 NOTE — PROGRESS NOTES
"[facility]    Medicine Progress Note - Hospitalist Service    Date of Admission:  3/7/2024    Interval History:   Patient lying in bed today. Not overtly engaged in conversation but extreme [facility]  Plans for hospice and working to arrange discharge   Received a phone call later in the day that daughter had arrived and family was wondering about reconsidering hospice.  Came to discuss current presentation.  Actually showed the daughter a picture of the large sacral decubitus area on his backside.  His daughter had no idea that he had such an open wound/pressure area.  Rediscussed all of his medical challenges and not great options.  After discussion this a.m. his daughter was in agreement that the patient should be enrolled in hospice and was going to talk further with her mother    Assessment & Plan   Hermilo Velasquez is a 89 year old male with a past medical history significant for lung cancer s/p wedge resection, recurrent c.diff, afib, chronic obstructive pulmonary disease, pulmonary embolism, aortic stenosis s/p bioprosthetic heart valve, chronic kidney disease admitted on 3/7/2024 for further evaluation of altered mental status.      Patient presented to the ED hemodynamically stable. SpO2 93% on 5 L NC (chronically on 3L O2 baseline). Temperature 98.9. Per EMS and patient's wife, patient's health has been declining over past three months. He experienced a fall 03/04/2024 for which he was seen in the ED and had a negative head CT. Wife states patient sat down in a chair on afternoon (03/06/2024) and essentially hasn't gotten up since. He was noted to be non-verbal upon EMS arrival, although he responded to pain. Upon arrival to [facility] ED, patient open eyes to voice and will respond verbally with \"yes or no.\" Wife notes while patient has had difficulty communicating since his nodule in his mouth has grown larger, he does usually communicate regularly with her at home. Patient is very " lethargic upon examination and unable to provide his own history. Labs in the ED generally unremarkable with no leukocytosis. Procalcitonin 0.79 (improved from 21.39 from 02/19 hospital stay). Lactate WNL (1.9). Blood cultures obtained .  No significant electrolyte abnormality or worsening of his chronic anemia (hgb 11.1 and baseline 9.0). Glucose 116. . VBGs notable for a pH 7.36, CO2 67, Bicarb 38. UA and urine drug screen obtained and negative for infection. Influenza, COVID and RSV pending. Ketone quantitative <0.18. Urine drug screen negative. CXR demonstrated stable size of cardiac silhouette status post median sternotomy and aortic valve replacement. Slight improvement in aeration in the right lower lobe with decreased conspicuity of airspace disease since prior exam. Persistent small bilateral pleural effusions, right larger than left. No pneumothorax. CT negative for acute intracranial pathology.      Of note, patient has history of encephalopathy during several past hospital stays, most recently for sepsis secondary to bilateral pneumonia 02/19/2024 - 02/28/2024.      Failure to thrive  Goals of care  7th admission since 08/2023 with continued ongoing decline at home.   TCU has been recommended multiple times and at every discharge, however, patient continues to decline.  Palliative care has been included in past hospitalizations to discuss goals of care.   Patient wants restorative care including full code. Wife this admit stating she is unable to manage patient at home and is agreeable to TCU, should patient be appropriate for TCU placement.   Palliative care consult again requested and conference 3/11 with plans for hospice.   Initially considered US biopsy of cervical LN but then family changed their mind.   3/11 lengthy discussion with the family including his son Mikal and wife Roberta.  Family ultimately agreed to hospice  3/14 rediscussion with the family again this time wife Roberta and daughter.      Severe aortic stenosis s/p bioprosthetic aortic valve  Hypertension  Hyperlipidemia  Elevated BNP  Chronically elevated troponin  Hx of orthostatic hypotension  IVF for orthostatic BPs 3/9   2/21/2024 ECHO: Difficult secondary to patient intolerant to the study.  LV normal.  Right ventricle not well-visualized however global systolic function is mildly reduced.  Aortic valve replacement with 25 mm [employer] trifecta bioprosthetic valve.  On Florinef 0.1 mg (1/2 tablet daily )      Acute encephalopathy   Suspect dementia with occasional delirium and agitation   Difficult to establish baseline of mental status.   Patient has history of encephalopathy during several past hospital stays, most recently for sepsis secondary to bilateral pneumonia 02/19/2024 - 02/28/2024.   CT head upon admission negative for acute intracranial pathology.   Brain MRI obtained 01/2024 and demonstrated no acute intracranial process. Mild dural thickening and enhancement along the left lateral frontal lobe, nonspecific, but likely unchanged compared to prior brain MRI 06/28/2023. Generalized brain atrophy and presumed microvascular ischemic changes as detailed above.  3/8 resumed on Cymbalta 40 mg daily  3/8 resumed on Neurontin 300 mg at at bedtime  PRN PO Seroquel available and IV/IM.  Continue to monitor with morning labs (CBC, CMP, lactate, VBG).      Acute on chronic hypoxic respiratory failure on 3L O2 at home  Chronic obstructive pulmonary disease (COPD)  Recent hospitalization fro sepsis secondary to bilateral pneumonia   No significant wheezing recently, low suspicion of acute COPD exacerbation at this time. Required 5L O2 upon arrival.   3/10 currently on 2.5 L.  History of COPD  Continue PTA inhalers once med rec completed.      FDG avid soft tissue nodule in the right tongue base, needs follow-up   Concern for squamous cell carcinoma; also concern if causing aspiration and PNA.  Appointment with ENT on 1/11 was cancelled to  "work up the lung first.  Wife planned to make appointment with ENT several months ago.   ENT  2/23/24: \"Interval PET imaging with diagnostic CT neck with contrast for further characterization would be necessary and recommended before considering biopsy.   Ultimately, direct laryngoscopy with biopsy in the operating room would be necessary to obtain tissue diagnosis from this site. Would not recommend any further imaging or surgical intervention until determination from medical oncology appropriateness for any chemotherapeutic management. Findings in the base of tongue would not be treated surgically at this time. \"    3/11 initially plans for biopsy of LN and now changed in plans from family.   3/12 they want to focus on comfort and transition to hospice no plans for biopsy.  3/14 family bedside discussion again and ultimately ok with hospice      Dysphagia, modified diet  Suspect secondary to soft tissue nodule noted above   Continue dysphagia diet.  Speech consulted in hospital last visit; recommended slightly thick diet.   Encourage oral intake.  Ensure enlive between meals.      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 Planned for pet scan and biopsy of nodule on 1/9 was cancelled by the patient.  Cytology on pleural fluid 2/20, negative for malignancy; acute inflammation noted.  Outpatient follow-up with oncology, primary clinic provider to review/arrange initially planned but now he will go transition into hospice  Patient has increasing pulmonary nodules as well. 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     Hx of VTE/pulmonary embolism (PE)   History of subsegmental pulmonary embolism (PE) on 11/25/23  Not on anticoagulation due to GI bleed. Hx of IVC filter in the past, " not clear if it was removed.  3/13 Risk for future events as not anticoagulated      Paroxysmal atrial fibrillation  Not on anticoagulation due to anemia thought to be due to a GI bleed. Has history of angioextasia and diverticulosis.   Telemetry.      Anemia, normocytic  Thrombocytopenia, resolved  No worsening of patient's chronic anemia. Hgb 11.1 and baseline 9.0.   Continue to monitor with morning labs.       Benign prostatic hypertrophy (BPH)  Continue PTA tamsulosin (Flomax) once med rec complete.   Monitor for urinary retention, bladder scan PRN.      History of GI bleed  Gastroesophageal reflux disease (GERD)  Anemia  Continue PTA famotidine, allergic to PPI.  Monitor hemoglobin as above.  Reviewed chart regarding GI bleeds.  He has AVMs and was not tolerant to anticoagulation.  He was initially on Eliquis because of an incidental PE found on imaging.  Unfortunately he developed GI bleeds and with evaluation found to have AVMs and risk for further bleeding so he could not tolerate anticoagulation  This will place him at high risk again for recurrent thrombotic complications especially in the setting of his cancer.  This adds to why he is very appropriate for hospice.     Chronic kidney disease (CKD) stage 2  Baseline creatinine around 0.8, 1.09 on admission.   Start maintenance IV fluids; suspect due to dehydration and limited PO intake; decreased from 100 mL/hr to 50 mL/hr.   Encourage PO intake.   Avoid nephrotoxic agents as able.      Depression/Anxiety/Pain  PTA Duloxetine and gabapentin doses reduced per family request last hospitalization.   Continued on duloxetine and gabapentin     Deep Tissue Pressure Injury to sacrum and bilateral buttocks  WOC consulted, appreciate the cares.   He has a significant wound on his sacrum and bilateral buttocks.   Pictures in EPIC  Risk of breakdown   3/14 Daughter able to see pictures of sacral decubitus area and in agreement with hospice.      Weakness and  deconditioning   Physical therapy (PT), CM/SW consulted, appreciate the cares.  Patient's wife believe's patient would now be served better in TCU versus home as she is unable to care for patient independently.   Last hospitalization, however, patient refused repeatedly.   3/12 attempted to talk with the patient about going to a place where he could be cared for as his wife cannot take care of him . He does not really appear to have much insight     Sacral ulcer/pressure sore  Reviewed WOC findings of his sacral decubitus area.  He has a significant large pressure ulcer on his sacral region and at high risk for breakdown, infection and further complication  This again adds to the indication for hospice  High chance that this will progress    Currently awaiting hospice placement.   He is ready to go anytime.       Diet: Combination Diet Regular Diet Adult; Mechanical/Dental Soft Diet; Slightly Thick (level 1)  Snacks/Supplements Adult: Ensure Enlive; Between Meals  Room Service    DVT Prophylaxis: Pneumatic Compression Devices  Suazo Catheter: Not present  Lines: None     Cardiac Monitoring: ACTIVE order. Indication: Atrial Fibrillation  Code Status: Full Code          Clinically Significant Risk Factors              # Hypoalbuminemia: Lowest albumin = 3 g/dL at 3/7/2024 12:56 PM, will monitor as appropriate     # Hypertension: Noted on problem list    # Chronic heart failure with preserved ejection fraction: heart failure noted on problem list and last echo with EF >50%        # Severe Malnutrition: based on nutrition assessment      # Financial/Environmental Concerns:    # COPD: noted on problem list        Disposition Plan      Expected Discharge Date: 03/15/2024    Discharge Delays: Comfort Care/Hospice  *Medically Ready for Discharge  Placement - LTC    Discharge Comments: Placement with Hospice. [facility].            INOCENCIA HALL MD  Hospitalist Service  [facility]  Securely  message with [employer] (more info)  Text page via [employer] Paging/Directory   ______________________________________________________________________      Physical Exam   Vital Signs: Temp: 97.8  F (36.6  C) Temp src: Oral BP: 94/53 Pulse: 98   Resp: 24 SpO2: 91 % O2 Device: Nasal cannula Oxygen Delivery: 3 LPM  Weight: 132 lbs 4.42 oz    General Appearance:  Patient is slightly more awake   Respiratory: Decreased BS bilaterally   Cardiovascular: Regular rate with no gallop or rub  GI: + BS, soft, non tender  Skin: no overt edema       Medical Decision Making       45 MINUTES SPENT BY ME on the date of service doing chart review, history, exam, documentation & further activities per the note.    Multiple discussions again regarding coordination of his care which will be hopefully to hospice soon as a bed can be obtained.  He has [facility] benefits.  Hopefully this will give him more opportunity        No results found for this or any previous visit (from the past 24 hour(s)).

## 2024-03-15 NOTE — PLAN OF CARE
Goal Outcome Evaluation:  Cognitive Concerns/ Orientation : A/Ox1, disoriented to place and situation. Confused. Forgetful. Very [place]. Garbled speech.  BEHAVIOR & AGGRESSION TOOL COLOR: Green   ABNL VS/O2: VSS on 3L NC (baseline)  MOBILITY: Assist x1 with walker and gait belt, fall risk. Not OOB. Reposition q2h.  PAIN MANAGMENT: Denies  DIET: Regular, soft and bite size, slightly thick-feeding assistance provided.  BOWEL/BLADDER: Incontinent of bowel and bladder at times. Uses urinal at times  DRAIN/DEVICES: IV SL  SKIN: Dressing on left hand and elbow, CDI. Dressing on cocccyx, CDI. Scattered scabs and bruises.  D/C DATE: Discharge pending placement with hospice, SW following  OTHER IMPORTANT INFO: Lung sounds diminished. Family visiting

## 2024-03-15 NOTE — PROGRESS NOTES
CLINICAL NUTRITION SERVICES - BRIEF NOTE    - Per chart review, pt has transitioned to comfort cares  - Nutrition will sign off at this time  - Please consult if any further nutrition interventions arise    Echo Lunsford RD, LD  Clinical Dietitian - [facility]

## 2024-03-15 NOTE — PROGRESS NOTES
Care Management Follow Up    Length of Stay (days): 8    Expected Discharge Date: 03/18/2024     Concerns to be Addressed: discharge planning     Patient plan of care discussed at interdisciplinary rounds: Yes    Anticipated Discharge Disposition: Hospice, Skilled Nursing Facility     Anticipated Discharge Services:    Anticipated Discharge DME:      Patient/family educated on Medicare website which has current facility and service quality ratings: yes  Education Provided on the Discharge Plan: Yes  Patient/Family in Agreement with the Plan: yes    Referrals Placed by CM/SW: Post Acute Facilities  Private pay costs discussed: Not applicable    Additional Information:  Patient will be discharging on Monday to SNF with hospice. Patient has been accepted at [facility] in [place]. Transport will need to be arranged so patient can be at the facility by 1 pm. [facility] has accepted the patient and they will be signing the patient on at this time. Patient will need to discharge with 2-3 day of comfort medications. Contact for [facility] is Maddison at [phone]. She would like a voicemail left of transport time when set up. Patient will need discharge orders faxed to both the facility and to the hospice agency. Writer called spouse Roberta and updated her with the plan and had to leave a voicemail.    Mario Lindsay [facility]  Care Management

## 2024-03-15 NOTE — PLAN OF CARE
DATE & TIME: 3/14/23 6571-5730    Cognitive Concerns/ Orientation : Pt A/Ox2, disoriented to place and situation. Confused. Forgetful. Garbled speech. Calm and cooperative.   BEHAVIOR & AGGRESSION TOOL COLOR: Green   ABNL VS/O2: VSS on 3L NC (baseline)  MOBILITY: Assist x1 with walker and gait belt, fall risk. Reposition q2h.  PAIN MANAGMENT: Denies  DIET: Regular, soft and bite size, slightly thick  BOWEL/BLADDER: Incontinent of bowel and bladder at times. Pt will occasionally request urinal.  DRAIN/DEVICES: IV SL  SKIN: Dressing on left hand and elbow, CDI. Dressing on cocccyx, CDI. Scattered scabs and bruises.  D/C DATE: Discharge pending placement with hospice, SW following  OTHER IMPORTANT INFO: Lung sounds diminished. Infrequent, nonproductive cough. Pt brushed his own teeth this evening and staff assisted with cleaning dentures. He was more awake this evening.

## 2024-03-15 NOTE — PLAN OF CARE
Goal Outcome Evaluation:  DATE & TIME: 3/15 [id number]  Cognitive Concerns/ Orientation : A/Ox1-2, disoriented to place and situation. Confused. Forgetful. Very [place]. Garbled speech.  BEHAVIOR & AGGRESSION TOOL COLOR: Green   ABNL VS/O2: VSS on 3L NC (baseline)  MOBILITY: Assist x1 with walker and gait belt, fall risk. Not OOB. Reposition q2h.  PAIN MANAGMENT: Denies  DIET: Regular, soft and bite size, slightly thick-feeding assistance provided.  BOWEL/BLADDER: Incontinent of bowel and bladder at times. Uses urinal at times  DRAIN/DEVICES: IV SL  SKIN: Dressing on left hand and elbow, CDI. Dressing on cocccyx, CDI. Scattered scabs and bruises.  D/C DATE: Discharge pending placement with hospice, SW following  OTHER IMPORTANT INFO: Lung sounds diminished. Family visiting

## 2024-03-15 NOTE — PROGRESS NOTES
"[facility]    Medicine Progress Note - Hospitalist Service    Date of Admission:  3/7/2024    Interval History:   More family present today Patient did not recognized some of the people in the room but pleasant. Roberta asked again why he is thought to have cancer. Encouraged other family members now visiting today to discuss with his son and daughter.   Has place to go now with hospice in [place]. Discharge when arrangements are made.     Assessment & Plan   Hermilo Velaqsuez is a 89 year old male with a past medical history significant for lung cancer s/p wedge resection, recurrent c.diff, afib, chronic obstructive pulmonary disease, pulmonary embolism, aortic stenosis s/p bioprosthetic heart valve, chronic kidney disease admitted on 3/7/2024 for further evaluation of altered mental status.      Patient presented to the ED hemodynamically stable. SpO2 93% on 5 L NC (chronically on 3L O2 baseline). Temperature 98.9. Per EMS and patient's wife, patient's health has been declining over past three months. He experienced a fall 03/04/2024 for which he was seen in the ED and had a negative head CT. Wife states patient sat down in a chair on afternoon (03/06/2024) and essentially hasn't gotten up since. He was noted to be non-verbal upon EMS arrival, although he responded to pain. Upon arrival to [facility] ED, patient open eyes to voice and will respond verbally with \"yes or no.\" Wife notes while patient has had difficulty communicating since his nodule in his mouth has grown larger, he does usually communicate regularly with her at home. Patient is very lethargic upon examination and unable to provide his own history. Labs in the ED generally unremarkable with no leukocytosis. Procalcitonin 0.79 (improved from 21.39 from 02/19 hospital stay). Lactate WNL (1.9). Blood cultures obtained .  No significant electrolyte abnormality or worsening of his chronic anemia (hgb 11.1 and baseline 9.0). " Glucose 116. . VBGs notable for a pH 7.36, CO2 67, Bicarb 38. UA and urine drug screen obtained and negative for infection. Influenza, COVID and RSV pending. Ketone quantitative <0.18. Urine drug screen negative. CXR demonstrated stable size of cardiac silhouette status post median sternotomy and aortic valve replacement. Slight improvement in aeration in the right lower lobe with decreased conspicuity of airspace disease since prior exam. Persistent small bilateral pleural effusions, right larger than left. No pneumothorax. CT negative for acute intracranial pathology.      Of note, patient has history of encephalopathy during several past hospital stays, most recently for sepsis secondary to bilateral pneumonia 02/19/2024 - 02/28/2024.      Failure to thrive  Goals of care  7th admission since 08/2023 with continued ongoing decline at home. TCU has been recommended multiple times and at every discharge, however, patient continues to decline. Palliative care has been included in past hospitalizations to discuss goals of care. Patient wants restorative care including full code. Wife this admit stating she is unable to manage patient at home and is agreeable to TCU, should patient be appropriate for TCU placement. Palliative care consult again requested and conference 3/11 with plans for hospice. Initially considered US biopsy of cervical LN but then family changed their mind.  3/11 lengthy discussion with the family including his son Mikal and wife Roberta. Family ultimately agreed to hospice. 3/14 rediscussion with the family again this time wife Roberta and daughter.  Family present today and updated.   Has hospice disposition place.      Severe aortic stenosis s/p bioprosthetic aortic valve  Hypertension  Hyperlipidemia  Elevated BNP  Chronically elevated troponin  Hx of orthostatic hypotension  IVF for orthostatic BPs 3/9.  2/21/2024 ECHO: Difficult secondary to patient intolerant to the study.  LV normal.  Right  "ventricle not well-visualized however global systolic function is mildly reduced.  Aortic valve replacement with 25 mm [employer] trifecta bioprosthetic valve.  On Florinef 0.1 mg (1/2 tablet daily )      Acute encephalopathy   Suspect dementia with occasional delirium and agitation   Difficult to establish baseline of mental status.   Patient has history of encephalopathy during several past hospital stays, most recently for sepsis secondary to bilateral pneumonia 02/19/2024 - 02/28/2024.   CT head upon admission negative for acute intracranial pathology.   Brain MRI obtained 01/2024 and demonstrated no acute intracranial process. Mild dural thickening and enhancement along the left lateral frontal lobe, nonspecific, but likely unchanged compared to prior brain MRI 06/28/2023. Generalized brain atrophy and presumed microvascular ischemic changes as detailed above.  3/8 resumed on Cymbalta 40 mg daily  3/8 resumed on Neurontin 300 mg at at bedtime  PRN PO Seroquel available and IV/IM.  Continue to monitor with morning labs (CBC, CMP, lactate, VBG).      Acute on chronic hypoxic respiratory failure on 3L O2 at home  Chronic obstructive pulmonary disease (COPD)  Recent hospitalization fro sepsis secondary to bilateral pneumonia   No significant wheezing recently, low suspicion of acute COPD exacerbation at this time. Required 5L O2 upon arrival.   3/10 currently on 2.5 L.  History of COPD  Continue PTA inhalers once med rec completed.      FDG avid soft tissue nodule in the right tongue base, needs follow-up   Concern for squamous cell carcinoma; also concern if causing aspiration and PNA.  Appointment with ENT on 1/11 was cancelled to work up the lung first.  Wife planned to make appointment with ENT several months ago.   ENT  2/23/24: \"Interval PET imaging with diagnostic CT neck with contrast for further characterization would be necessary and recommended before considering biopsy.   Ultimately, direct " "laryngoscopy with biopsy in the operating room would be necessary to obtain tissue diagnosis from this site. Would not recommend any further imaging or surgical intervention until determination from medical oncology appropriateness for any chemotherapeutic management. Findings in the base of tongue would not be treated surgically at this time. \"    3/11 initially plans for biopsy of LN and now changed in plans from family.   3/12 they want to focus on comfort and transition to hospice no plans for biopsy.  3/14 family bedside discussion again and ultimately ok with hospice      Dysphagia, modified diet  Suspect secondary to soft tissue nodule noted above   Continue dysphagia diet.  Speech consulted in hospital last visit; recommended slightly thick diet.   Encourage oral intake.  Ensure enlive between meals.   3/15: Nursing staff reports he had a poor appetite.      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 Planned for pet scan and biopsy of nodule on 1/9 was cancelled by the patient.  Cytology on pleural fluid 2/20, negative for malignancy; acute inflammation noted.  Outpatient follow-up with oncology, primary clinic provider to review/arrange initially planned but now he will go transition into hospice  Patient has increasing pulmonary nodules as well. >>  Concern for malignancy and progression of cancer  He was unable to have the procedure to get a biopsy. 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     Hx of VTE/pulmonary embolism (PE)   History of subsegmental pulmonary embolism (PE) on 11/25/23  Not on anticoagulation due to GI bleed. Hx of IVC filter in the past, not clear if it was removed.  3/13 Risk for future events as not anticoagulated      Paroxysmal atrial fibrillation  Not on " anticoagulation due to anemia thought to be due to a GI bleed. Has history of angioextasia and diverticulosis.   Telemetry.      Anemia, normocytic  Thrombocytopenia, resolved  No worsening of patient's chronic anemia. Hgb 11.1 and baseline 9.0.   Continue to monitor with morning labs.       Benign prostatic hypertrophy (BPH)  Continue PTA tamsulosin (Flomax) once med rec complete.   Monitor for urinary retention, bladder scan PRN.      History of GI bleed  Gastroesophageal reflux disease (GERD)  Anemia  Continue PTA famotidine, allergic to PPI.  Monitor hemoglobin as above.  Reviewed chart regarding GI bleeds.  He has AVMs and was not tolerant to anticoagulation.  He was initially on Eliquis because of an incidental PE found on imaging.  Unfortunately he developed GI bleeds and with evaluation found to have AVMs and risk for further bleeding so he could not tolerate anticoagulation  This will place him at high risk again for recurrent thrombotic complications especially in the setting of his cancer.  This adds to why he is very appropriate for hospice.     Chronic kidney disease (CKD) stage 2  Baseline creatinine around 0.8, 1.09 on admission.   Start maintenance IV fluids; suspect due to dehydration and limited PO intake; decreased from 100 mL/hr to 50 mL/hr.   Encourage PO intake.   Avoid nephrotoxic agents as able.      Depression/Anxiety/Pain  PTA Duloxetine and gabapentin doses reduced per family request last hospitalization.   Continued on duloxetine and gabapentin     Deep Tissue Pressure Injury to sacrum and bilateral buttocks  WO consulted, appreciate the cares.   He has a significant wound on his sacrum and bilateral buttocks.   Pictures in EPIC  Risk of breakdown   3/14 Daughter able to see pictures of sacral decubitus area and in agreement with hospice.      Weakness and deconditioning   Physical therapy (PT), CM/SW consulted, appreciate the cares.  Patient's wife believe's patient would now be served  better in TCU versus home as she is unable to care for patient independently.   Last hospitalization, however, patient refused repeatedly.   3/12 attempted to talk with the patient about going to a place where he could be cared for as his wife cannot take care of him . He does not really appear to have much insight     Sacral ulcer/pressure sore  Reviewed WOC findings of his sacral decubitus area.  He has a significant large pressure ulcer on his sacral region and at high risk for breakdown, infection and further complication  This again adds to the indication for hospice  High chance that this will progress    Currently awaiting hospice placement.   He is ready to go anytime.   Hospice place found but uncertain of timing.       Diet: Combination Diet Regular Diet Adult; Mechanical/Dental Soft Diet; Slightly Thick (level 1)  Snacks/Supplements Adult: Ensure Enlive; Between Meals  Room Service    DVT Prophylaxis: Pneumatic Compression Devices  Suazo Catheter: Not present  Lines: None     Cardiac Monitoring: ACTIVE order. Indication: Atrial Fibrillation  Code Status: Full Code          Clinically Significant Risk Factors              # Hypoalbuminemia: Lowest albumin = 3 g/dL at 3/7/2024 12:56 PM, will monitor as appropriate     # Hypertension: Noted on problem list    # Chronic heart failure with preserved ejection fraction: heart failure noted on problem list and last echo with EF >50%        # Severe Malnutrition: based on nutrition assessment      # Financial/Environmental Concerns:    # COPD: noted on problem list        Disposition Plan      Expected Discharge Date: 03/18/2024    Discharge Delays: Comfort Care/Hospice  *Medically Ready for Discharge  Placement - LTC    Discharge Comments: Placement with Hospice. [facility].            INOCENCIA HALL MD  Hospitalist Service  [facility]  Securely message with [employer] (more info)  Text page via [employer] Paging/Directory    ______________________________________________________________________      Physical Exam   Vital Signs: Temp: 97.7  F (36.5  C) Temp src: Oral BP: 102/63 Pulse: 104   Resp: 18 SpO2: 93 % O2 Device: Nasal cannula Oxygen Delivery: 3 LPM  Weight: 136 lbs 3.91 oz    General Appearance:  Patient is slightly more awake   Respiratory: Decreased BS bilaterally   Cardiovascular: Regular rate with no gallop or rub  GI: + BS, soft, non tender  Skin: no overt edema       Medical Decision Making       45 MINUTES SPENT BY ME on the date of service doing chart review, history, exam, documentation & further activities per the note.    More family present today. Discussion regarding over all health and recommendations for hospice       No results found for this or any previous visit (from the past 24 hour(s)).

## 2024-03-16 NOTE — PLAN OF CARE
Goal Outcome Evaluation:  DATE & TIME: 3/16  [id number]  Cognitive Concerns/ Orientation : A/Ox1-2, disoriented to place and situation. Confused. Forgetful. Very [place].   BEHAVIOR & AGGRESSION TOOL COLOR: Green   ABNL VS/O2: VSS on 3L NC (baseline)  MOBILITY: Assist x1 with walker and gait belt, fall risk. Up in chair, T&R Q2 hours while in bed (Coccyx wound)  PAIN MANAGMENT: Denies  DIET: Regular, soft and bite size, slightly thick  BOWEL/BLADDER: Incontinent of bowel and bladder at times. Uses urinal at times  DRAIN/DEVICES: IV SL  SKIN: Dressing on left hand and elbow, CDI. Dressing on coccyx changed this am.  Scattered scabs and bruises.  D/C DATE: Per SW note  Patient has been accepted at [facility] in [place]. Transport will need to be arranged so patient can be at the facility by 1 pm on Monday. [facility] has accepted the patient and they will be signing the patient on at this time. Patient will need to discharge with 2-3 day of comfort medications. Contact for [facility] is Maddison at [phone].  OTHER IMPORTANT INFO: Lung sounds diminished. Wife updated today.

## 2024-03-16 NOTE — PROGRESS NOTES
"[facility]    Medicine Progress Note - Hospitalist Service    Date of Admission:  3/7/2024    Interval History:   Patient has no family present today.  He is planning to go to hospice on Monday in [place].  Will review his vancomycin need.  Would appear that he was to titrate off this as an outpatient in late February which had not been done yet.  Appears that he was to taper off of the Vanco which has not been done    Assessment & Plan   Hermilo Velasquez is a 89 year old male with a past medical history significant for lung cancer s/p wedge resection, recurrent c.diff, afib, chronic obstructive pulmonary disease, pulmonary embolism, aortic stenosis s/p bioprosthetic heart valve, chronic kidney disease admitted on 3/7/2024 for further evaluation of altered mental status.      Patient presented to the ED hemodynamically stable. SpO2 93% on 5 L NC (chronically on 3L O2 baseline). Temperature 98.9. Per EMS and patient's wife, patient's health has been declining over past three months. He experienced a fall 03/04/2024 for which he was seen in the ED and had a negative head CT. Wife states patient sat down in a chair on afternoon (03/06/2024) and essentially hasn't gotten up since. He was noted to be non-verbal upon EMS arrival, although he responded to pain. Upon arrival to [facility] ED, patient open eyes to voice and will respond verbally with \"yes or no.\" Wife notes while patient has had difficulty communicating since his nodule in his mouth has grown larger, he does usually communicate regularly with her at home. Patient is very lethargic upon examination and unable to provide his own history. Labs in the ED generally unremarkable with no leukocytosis. Procalcitonin 0.79 (improved from 21.39 from 02/19 hospital stay). Lactate WNL (1.9). Blood cultures obtained .  No significant electrolyte abnormality or worsening of his chronic anemia (hgb 11.1 and baseline 9.0). Glucose 116. . " VBGs notable for a pH 7.36, CO2 67, Bicarb 38. UA and urine drug screen obtained and negative for infection. Influenza, COVID and RSV pending. Ketone quantitative <0.18. Urine drug screen negative. CXR demonstrated stable size of cardiac silhouette status post median sternotomy and aortic valve replacement. Slight improvement in aeration in the right lower lobe with decreased conspicuity of airspace disease since prior exam. Persistent small bilateral pleural effusions, right larger than left. No pneumothorax. CT negative for acute intracranial pathology.      Of note, patient has history of encephalopathy during several past hospital stays, most recently for sepsis secondary to bilateral pneumonia 02/19/2024 - 02/28/2024.      Failure to thrive  Goals of care  7th admission since 08/2023 with continued ongoing decline at home. TCU has been recommended multiple times and at every discharge, however, patient continues to decline. Palliative care has been included in past hospitalizations to discuss goals of care. Patient wants restorative care including full code. Wife this admit stating she is unable to manage patient at home and is agreeable to TCU, should patient be appropriate for TCU placement. Palliative care consult again requested and conference 3/11 with plans for hospice. Initially considered US biopsy of cervical LN but then family changed their mind.  3/11 lengthy discussion with the family including his son Mikal and wife Roberta. Family ultimately agreed to hospice. 3/14 rediscussion with the family again this time wife Roberta and daughter.  Family present today and updated.   Has hospice disposition place.      Severe aortic stenosis s/p bioprosthetic aortic valve  Hypertension  Hyperlipidemia  Elevated BNP  Chronically elevated troponin  Hx of orthostatic hypotension  IVF for orthostatic BPs 3/9.  2/21/2024 ECHO: Difficult secondary to patient intolerant to the study.  LV normal.  Right ventricle not  "well-visualized however global systolic function is mildly reduced.  Aortic valve replacement with 25 mm [employer] trifecta bioprosthetic valve.  On Florinef 0.1 mg (1/2 tablet daily )      Acute encephalopathy   Suspect dementia with occasional delirium and agitation   Difficult to establish baseline of mental status.   Patient has history of encephalopathy during several past hospital stays, most recently for sepsis secondary to bilateral pneumonia 02/19/2024 - 02/28/2024.   CT head upon admission negative for acute intracranial pathology.   Brain MRI obtained 01/2024 and demonstrated no acute intracranial process. Mild dural thickening and enhancement along the left lateral frontal lobe, nonspecific, but likely unchanged compared to prior brain MRI 06/28/2023. Generalized brain atrophy and presumed microvascular ischemic changes as detailed above.  3/8 resumed on Cymbalta 40 mg daily  3/8 resumed on Neurontin 300 mg at at bedtime  PRN PO Seroquel available and IV/IM.  Continue to monitor with morning labs (CBC, CMP, lactate, VBG).      Acute on chronic hypoxic respiratory failure on 3L O2 at home  Chronic obstructive pulmonary disease (COPD)  Recent hospitalization fro sepsis secondary to bilateral pneumonia   No significant wheezing recently, low suspicion of acute COPD exacerbation at this time. Required 5L O2 upon arrival.   3/10 currently on 2.5 L.  History of COPD  Continue PTA inhalers once med rec completed.      FDG avid soft tissue nodule in the right tongue base, needs follow-up   Concern for squamous cell carcinoma; also concern if causing aspiration and PNA.  Appointment with ENT on 1/11 was cancelled to work up the lung first.  Wife planned to make appointment with ENT several months ago.   ENT  2/23/24: \"Interval PET imaging with diagnostic CT neck with contrast for further characterization would be necessary and recommended before considering biopsy.   Ultimately, direct laryngoscopy with biopsy " "in the operating room would be necessary to obtain tissue diagnosis from this site. Would not recommend any further imaging or surgical intervention until determination from medical oncology appropriateness for any chemotherapeutic management. Findings in the base of tongue would not be treated surgically at this time. \"    3/11 initially plans for biopsy of LN and now changed in plans from family.   3/12 they want to focus on comfort and transition to hospice no plans for biopsy.  3/14 family bedside discussion again and ultimately ok with hospice      Dysphagia, modified diet  Suspect secondary to soft tissue nodule noted above   Continue dysphagia diet.  Speech consulted in hospital last visit; recommended slightly thick diet.   Encourage oral intake.  Ensure enlive between meals.   3/15: Nursing staff reports he had a poor appetite.      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 Planned for pet scan and biopsy of nodule on 1/9 was cancelled by the patient.  Cytology on pleural fluid 2/20, negative for malignancy; acute inflammation noted.  Outpatient follow-up with oncology, primary clinic provider to review/arrange initially planned but now he will go transition into hospice  Patient has increasing pulmonary nodules as well. >>  Concern for malignancy and progression of cancer  He was unable to have the procedure to get a biopsy. 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     Hx of VTE/pulmonary embolism (PE)   History of subsegmental pulmonary embolism (PE) on 11/25/23  Not on anticoagulation due to GI bleed. Hx of IVC filter in the past, not clear if it was removed.  3/13 Risk for future events as not anticoagulated      Paroxysmal atrial fibrillation  Not on anticoagulation due to anemia " thought to be due to a GI bleed. Has history of angioextasia and diverticulosis.   Telemetry.      Anemia, normocytic  Thrombocytopenia, resolved  No worsening of patient's chronic anemia. Hgb 11.1 and baseline 9.0.   Continue to monitor with morning labs.       Benign prostatic hypertrophy (BPH)  Continue PTA tamsulosin (Flomax) once med rec complete.   Monitor for urinary retention, bladder scan PRN.      History of GI bleed  Gastroesophageal reflux disease (GERD)  Anemia  Continue PTA famotidine, allergic to PPI.  Monitor hemoglobin as above.  Reviewed chart regarding GI bleeds.  He has AVMs and was not tolerant to anticoagulation.  He was initially on Eliquis because of an incidental PE found on imaging.  Unfortunately he developed GI bleeds and with evaluation found to have AVMs and risk for further bleeding so he could not tolerate anticoagulation  This will place him at high risk again for recurrent thrombotic complications especially in the setting of his cancer.  This adds to why he is very appropriate for hospice.     Chronic kidney disease (CKD) stage 2  Baseline creatinine around 0.8, 1.09 on admission.   Start maintenance IV fluids; suspect due to dehydration and limited PO intake; decreased from 100 mL/hr to 50 mL/hr.   Encourage PO intake.   Avoid nephrotoxic agents as able.      Depression/Anxiety/Pain  PTA Duloxetine and gabapentin doses reduced per family request last hospitalization.   Continued on duloxetine and gabapentin     Deep Tissue Pressure Injury to sacrum and bilateral buttocks  WOC consulted, appreciate the cares.   He has a significant wound on his sacrum and bilateral buttocks.   Pictures in EPIC  Risk of breakdown   3/14 Daughter able to see pictures of sacral decubitus area and in agreement with hospice.      Weakness and deconditioning   Physical therapy (PT), CM/SW consulted, appreciate the cares.  Patient's wife believe's patient would now be served better in TCU versus home as  she is unable to care for patient independently.   Last hospitalization, however, patient refused repeatedly.   3/12 attempted to talk with the patient about going to a place where he could be cared for as his wife cannot take care of him . He does not really appear to have much insight     Sacral ulcer/pressure sore  Reviewed WOC findings of his sacral decubitus area.  He has a significant large pressure ulcer on his sacral region and at high risk for breakdown, infection and further complication  This again adds to the indication for hospice  High chance that this will progress    Currently awaiting hospice placement.   He is ready to go anytime.   Hospice place found but uncertain of timing.       Diet: Combination Diet Regular Diet Adult; Mechanical/Dental Soft Diet; Slightly Thick (level 1)  Snacks/Supplements Adult: Ensure Enlive; Between Meals  Room Service    DVT Prophylaxis: Pneumatic Compression Devices  Suazo Catheter: Not present  Lines: None     Cardiac Monitoring: ACTIVE order. Indication: Atrial Fibrillation  Code Status: Full Code          Clinically Significant Risk Factors              # Hypoalbuminemia: Lowest albumin = 3 g/dL at 3/7/2024 12:56 PM, will monitor as appropriate   # Thrombocytopenia: Lowest platelets = 129 in last 2 days, will monitor for bleeding   # Hypertension: Noted on problem list    # Chronic heart failure with preserved ejection fraction: heart failure noted on problem list and last echo with EF >50%        # Severe Malnutrition: based on nutrition assessment      # Financial/Environmental Concerns:    # COPD: noted on problem list        Disposition Plan      Expected Discharge Date: 03/18/2024    Discharge Delays: Comfort Care/Hospice  *Medically Ready for Discharge  Placement - LTC    Discharge Comments: Placement with Hospice. [facility].            INOCENCIA HALL MD  Hospitalist Service  [facility]  Securely message with [employer]  info)  Text page via [employer] Paging/Directory   ______________________________________________________________________      Physical Exam   Vital Signs: Temp: 97.3  F (36.3  C) Temp src: Oral BP: 108/67 Pulse: 102   Resp: 18 SpO2: 95 % O2 Device: Nasal cannula Oxygen Delivery: (P) 3 LPM  Weight: 136 lbs 3.91 oz    General Appearance:  Patient is slightly more awake   Respiratory: Decreased BS bilaterally   Cardiovascular: Regular rate with no gallop or rub  GI: + BS, soft, non tender  Skin: no overt edema       Medical Decision Making       35 MINUTES SPENT BY ME on the date of service doing chart review, history, exam, documentation & further activities per the note.    More family present today. Discussion regarding over all health and recommendations for hospice       No results found for this or any previous visit (from the past 24 hour(s)).

## 2024-03-16 NOTE — PLAN OF CARE
Goal Outcome Evaluation:      DATE & TIME: 3/15 4834-9532  Cognitive Concerns/ Orientation : A/Ox1-2, disoriented to place and situation. Confused. Forgetful. Very [place]. Garbled speech.  BEHAVIOR & AGGRESSION TOOL COLOR: Green   ABNL VS/O2: VSS on 3L NC (baseline)  MOBILITY: Assist x1 with walker and gait belt, fall risk. Not OOB. Reposition q2h.  PAIN MANAGMENT: Denies  DIET: Regular, soft and bite size, slightly thick-feeding assistance provided.  BOWEL/BLADDER: Incontinent of bowel and bladder at times. Uses urinal at times  DRAIN/DEVICES: IV SL  SKIN: Dressing on left hand and elbow, CDI. Dressing on cocccyx, CDI. Scattered scabs and bruises.  D/C DATE: Per SW note  Patient has been accepted at [facility] in [place]. Transport will need to be arranged so patient can be at the facility by 1 pm. [facility] has accepted the patient and they will be signing the patient on at this time. Patient will need to discharge with 2-3 day of comfort medications. Contact for [facility] is Maddison at [phone].  OTHER IMPORTANT INFO: Lung sounds diminished. Family visiting

## 2024-03-17 NOTE — PROGRESS NOTES
Care Management Follow Up    Length of Stay (days): 10    Expected Discharge Date: 03/18/2024     Concerns to be Addressed: discharge planning     Patient plan of care discussed at interdisciplinary rounds: Yes    Anticipated Discharge Disposition: Hospice, Skilled Nursing Facility     Anticipated Discharge Services:    Anticipated Discharge DME:      Patient/family educated on Medicare website which has current facility and service quality ratings: yes  Education Provided on the Discharge Plan: Yes  Patient/Family in Agreement with the Plan: yes    Referrals Placed by CM/SW: Post Acute Facilities  Private pay costs discussed: transportation costs    Additional Information:  Writer able to see report left by previous  noted that pt was accepted at [facility] on hospice with [facility] and transport to be set up for noon Monday.     Writer attended morning charge report. It is anticipated that pt will discharge to SNF with hospice tomorrow. Charge asking that writer reach out to doctor requesting three days of medication order be placed today so they can work on getting it ready for tomorrow.     Writer spoke with bedside RN regarding discharge plan for tomorrow and anticipated time period of around noon o as close to possible. Bedside RN states understanding. Writer asked bedside RN if pt can go wheelchair or if pt needs stretcher. Bedside RN states pt can go wheelchair and will need 3L of oxygen. Writer asked bedside RN if theres any concern of pt being a wander risk or not being able to be alone for the ride to SNF tomorrow in the back of the van. Bedside RN states pt is not a wander risk and no concern.     Writer spoke with  [facility] and requested wheelchair transport with oxygen to SNF. Transport time set up for [id number] this is the closest time that can be set up for pt discharge.    Writer called Maddison saey [phone] with [facility]. No answer. Writer left voicemail  informing her of plan for discharge tomorrow with transport time of 1015am-1100. Writer asked that tea call care management tomorrow morning as soon as she receives this voicemail to inform care management if this time period works ok for them and to inform of what number she would like discharge orders faxed to.     Writer called [facility]. No answer. Writer left voicemail for Ale with transport time of 1015am-1100 tomorrow and plan for discharge. Writer asked that Ale call care management tomorrow as soon as she receives message to inform care management if this time period works for pt.     Writer updated charge RN. Writer sent a message to Dr. Woodruff informing of discharge time of 1015-11 tomorrow and requesting 3 days of medications be ordered today if possible.  Writer able to see that doctor has seen this.     Writer stopped by pt's room. He is sound asleep. Writer called pt's wife Roberta. No answer. Writer left voicemail asking for call back.     PAS-RR    D: Per DHS regulation, SW completed and submitted PAS-RR to [place] [employer] Direct Connect via the Senior Link[employer] Line.  PAS-RR confirmation # is : [id number]    P: Further questions may be directed to Senior [employer] Line at #[phone], option #4 for PAS-RR staff.    Addendum: Writer faxed passr to SNF and placed it in pt's chart.    Jayda Herr, BSW  Social Work  [facility]

## 2024-03-17 NOTE — PLAN OF CARE
Goal Outcome Evaluation:       3/16/2024 [id number]  Cognitive Concerns/ Orientation : A/Ox1-2, disoriented to place and situation. Confused. Forgetful. Very [place].   BEHAVIOR & AGGRESSION TOOL COLOR: Green   ABNL VS/O2: VSS on 3L NC (baseline)  MOBILITY: Assist x1 with walker and gait belt, fall risk. Turn and reposition every 2 hours while in bed.   PAIN MANAGMENT: Denies  DIET: Regular, soft and bite size, slightly thick  BOWEL/BLADDER: Incontinent of bowel and bladder at times. Uses urinal at times  DRAIN/DEVICES: Peripheral IV SL left arm   SKIN: Dressing on left hand and elbow, CDI. Dressing on coccyx changed this am.  Scattered scabs and bruises.  D/C DATE: Per SW note  Patient has been accepted at [facility] in [place]. Transport will need to be arranged so patient can be at the facility by 1 pm on Monday. [facility] has accepted the patient and they will be signing the patient on at this time. Patient will need to discharge with 2-3 day of comfort medications. Contact for [facility] is Maddison at [phone].  OTHER IMPORTANT INFO: Lung sounds diminished. Wife updated today.

## 2024-03-17 NOTE — PLAN OF CARE
Goal Outcome Evaluation:  DATE & TIME: 3/17/2024 [id number]  Cognitive Concerns/ Orientation : A/Ox1-2, disoriented to place and situation. Confused. Forgetful. Very [place].   BEHAVIOR & AGGRESSION TOOL COLOR: Green   ABNL VS/O2: VSS on 3L NC (baseline). Two times daily vitals  MOBILITY: Assist x1 with walker and gait belt, fall risk. Up in chair X1 today. Turn and reposition every 2 hours while in bed.   PAIN MANAGMENT: Denies  DIET: Regular, soft and bite size, slightly thick  BOWEL/BLADDER: Incontinent of bowel and bladder at times. Also uses urinal   DRAIN/DEVICES: Peripheral IV SL left arm   SKIN: Dressing on left hand changed today, elbow dressingCDI. Dressing on coccyx changed this am.  Scattered scabs and bruises.  D/C DATE: Per SW note  Patient has been accepted at [facility] in [place]. health transport will have wheelchair for transport with oxygen to SNF. Transport time set up for [phone] on 3/18/24 this is the closest time that can be set up for pt discharge. patient can be at the facility by 1 pm on Monday. [facility] has accepted the patient and they will be signing the patient on at this time. Patient will need to discharge with 2-3 day of comfort medications. Contact for [facility] is Maddison at [phone].  OTHER IMPORTANT INFO: Lung sounds diminished.

## 2024-03-18 NOTE — PROGRESS NOTES
Speech Language Therapy Discharge Summary    Reason for therapy discharge:    Discharged to LTC, hospice    Progress towards therapy goal(s). See goals on Care Plan in [facility] electronic health record for goal details.  Goals partially met.  Barriers to achieving goals:   discharge from facility.    Therapy recommendation(s):    Patient may benefit from ongoing SLP services upon discharge. Deferred to hospice at next level of care.               03/14/24 [facility]   SLP Discharge Planning   SLP Plan Education on aspiration signs, strategies to attempt (pt likely to go hospice after discharge/po per wishes)   SLP Discharge Recommendation skilled nursing facility   SLP Rationale for DC Rec Anticipate no swallow Tx will be indicated after discharge - pt likely to go hospice after discharge/po per wishes   SLP Brief overview of current status  Significant time required to chew and clear oral cavity with soft and bite sized foods. Would consider downgrade to pureed diet, though pt did express enjoying the eggs and sausage. Will defer diet orders to MD. Given pt will likely change to hospice after discharge, will defer to MD/family/pt for po diet changes per wishes.  Least hazardoius option currently remains slightly thick liquids and a pureed diet with cues for strategies (chin tuck and small sips with thin, extra swallows, if wet voicing, cough and re-swallow).  No straws.

## 2024-03-18 NOTE — PROGRESS NOTES
Care Management Discharge Note    Discharge Date: 03/18/2024       Discharge Disposition: Hospice, Skilled Nursing Facility    Discharge Services:      Discharge DME:      Discharge Transportation: family or friend will provide    Private pay costs discussed: Not applicable    Does the patient's insurance plan have a 3 day qualifying hospital stay waiver?  No    PAS Confirmation Code:  [id number]   Patient/family educated on Medicare website which has current facility and service quality ratings: yes    Education Provided on the Discharge Plan: Yes  Persons Notified of Discharge Plans: Patient/family  Patient/Family in Agreement with the Plan: yes    Handoff Referral Completed: Yes    Additional Information:  [facility] and [facility]. SW changed transport to stretcher transport at 1144-1070 due to patient being confused and unable to manage O2. Sw updated [facility] on discharge time and they will have an oxygen tank ready for patient on arrival. LUIS spoke with [facility] and confirmed discharge plan and they will plan for intake at 1300. LUIS spoke with spouse Roberta who is in agreement and aware of transport plan. Roberta aware/in agreement with possible transport costs.LUIS faxed discharge orders to [facility] and [facility]. Patient will discharge with a 3 day supply of comfort meds. PCS faxed/added to the chart.    SY Gutierrez    [facility]

## 2024-03-18 NOTE — PROGRESS NOTES
"[facility]    Medicine Progress Note - Hospitalist Service    Date of Admission:  3/7/2024    Interval History:   Patient has his wife at the bedside today.  She states that they are leaving tomorrow.  Going to a new facility which she feels is really a nice facility .   Gets hospice benefits and [facility] benefits.   Patient has a fairly large tray in front of him with potatoes turkey and vegetables.  Not really eating very much    Assessment & Plan   Hermilo Velasquez is a 89 year old male with a past medical history significant for lung cancer s/p wedge resection, recurrent c.diff, afib, chronic obstructive pulmonary disease, pulmonary embolism, aortic stenosis s/p bioprosthetic heart valve, chronic kidney disease admitted on 3/7/2024 for further evaluation of altered mental status.      Patient presented to the ED hemodynamically stable. SpO2 93% on 5 L NC (chronically on 3L O2 baseline). Temperature 98.9. Per EMS and patient's wife, patient's health has been declining over past three months. He experienced a fall 03/04/2024 for which he was seen in the ED and had a negative head CT. Wife states patient sat down in a chair on afternoon (03/06/2024) and essentially hasn't gotten up since. He was noted to be non-verbal upon EMS arrival, although he responded to pain. Upon arrival to [facility] ED, patient open eyes to voice and will respond verbally with \"yes or no.\" Wife notes while patient has had difficulty communicating since his nodule in his mouth has grown larger, he does usually communicate regularly with her at home. Patient is very lethargic upon examination and unable to provide his own history. Labs in the ED generally unremarkable with no leukocytosis. Procalcitonin 0.79 (improved from 21.39 from 02/19 hospital stay). Lactate WNL (1.9). Blood cultures obtained .  No significant electrolyte abnormality or worsening of his chronic anemia (hgb 11.1 and baseline 9.0). Glucose 116. CK " 626. VBGs notable for a pH 7.36, CO2 67, Bicarb 38. UA and urine drug screen obtained and negative for infection. Influenza, COVID and RSV pending. Ketone quantitative <0.18. Urine drug screen negative. CXR demonstrated stable size of cardiac silhouette status post median sternotomy and aortic valve replacement. Slight improvement in aeration in the right lower lobe with decreased conspicuity of airspace disease since prior exam. Persistent small bilateral pleural effusions, right larger than left. No pneumothorax. CT negative for acute intracranial pathology.      Of note, patient has history of encephalopathy during several past hospital stays, most recently for sepsis secondary to bilateral pneumonia 02/19/2024 - 02/28/2024.      Failure to thrive  Goals of care  7th admission since 08/2023 with continued ongoing decline at home. TCU has been recommended multiple times and at every discharge, however, patient continues to decline. Palliative care has been included in past hospitalizations to discuss goals of care. Patient wants restorative care including full code. Wife this admit stating she is unable to manage patient at home and is agreeable to TCU, should patient be appropriate for TCU placement. Palliative care consult again requested and conference 3/11 with plans for hospice. Initially considered US biopsy of cervical LN but then family changed their mind.  3/11 lengthy discussion with the family including his son Mikal and wife Roberta. Family ultimately agreed to hospice. 3/14 rediscussion with the family again this time wife Roberta and daughter.  Family present today and updated.   Has hospice disposition place.   3/17 multiple discussions regarding hospice.  As of Sunday on 3/17 the family is in agreement for the patient to be enrolled in hospice and discharged to the facility tomorrow on Monday     Severe aortic stenosis s/p bioprosthetic aortic valve  Hypertension  Hyperlipidemia  Elevated BNP  Chronically  elevated troponin  Hx of orthostatic hypotension  IVF for orthostatic BPs 3/9.  2/21/2024 ECHO: Difficult secondary to patient intolerant to the study.  LV normal.  Right ventricle not well-visualized however global systolic function is mildly reduced.  Aortic valve replacement with 25 mm [employer] trifecta bioprosthetic valve.  On Florinef 0.1 mg (1/2 tablet daily )      Acute encephalopathy   Suspect dementia with occasional delirium and agitation   Difficult to establish baseline of mental status.   Patient has history of encephalopathy during several past hospital stays, most recently for sepsis secondary to bilateral pneumonia 02/19/2024 - 02/28/2024.   CT head upon admission negative for acute intracranial pathology.   Brain MRI obtained 01/2024 and demonstrated no acute intracranial process. Mild dural thickening and enhancement along the left lateral frontal lobe, nonspecific, but likely unchanged compared to prior brain MRI 06/28/2023. Generalized brain atrophy and presumed microvascular ischemic changes as detailed above.  3/8 resumed on Cymbalta 40 mg daily  3/8 resumed on Neurontin 300 mg at at bedtime  PRN PO Seroquel available and IV/IM.  Continue to monitor with morning labs (CBC, CMP, lactate, VBG).      Acute on chronic hypoxic respiratory failure on 3L O2 at home  Chronic obstructive pulmonary disease (COPD)  Recent hospitalization fro sepsis secondary to bilateral pneumonia   No significant wheezing recently, low suspicion of acute COPD exacerbation at this time. Required 5L O2 upon arrival.   3/10 currently on 2.5 L.  History of COPD  Continue PTA inhalers once med rec completed.   ADDENDUM: on inhalers incruse ellipa      FDG avid soft tissue nodule in the right tongue base, needs follow-up   Concern for squamous cell carcinoma; also concern if causing aspiration and PNA.  Appointment with ENT on 1/11 was cancelled to work up the lung first.  Wife planned to make appointment with ENT several  "months ago.   ENT  2/23/24: \"Interval PET imaging with diagnostic CT neck with contrast for further characterization would be necessary and recommended before considering biopsy.   Ultimately, direct laryngoscopy with biopsy in the operating room would be necessary to obtain tissue diagnosis from this site. Would not recommend any further imaging or surgical intervention until determination from medical oncology appropriateness for any chemotherapeutic management. Findings in the base of tongue would not be treated surgically at this time. \"    3/11 initially plans for biopsy of LN and now changed in plans from family.   3/12 they want to focus on comfort and transition to hospice no plans for biopsy.  3/14 family bedside discussion again and ultimately ok with hospice      Dysphagia, modified diet  Suspect secondary to soft tissue nodule noted above   Continue dysphagia diet.  Speech consulted in hospital last visit; recommended slightly thick diet.   Encourage oral intake.  Ensure enlive between meals.   3/15: Nursing staff reports he had a poor appetite.   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 Planned for pet scan and biopsy of nodule on 1/9 was cancelled by the patient.  Cytology on pleural fluid 2/20, negative for malignancy; acute inflammation noted.  Outpatient follow-up with oncology, primary clinic provider to review/arrange initially planned but now he will go transition into hospice  Patient has increasing pulmonary nodules as well. >>  Concern for malignancy and progression of cancer ON PET   He was unable to have the procedure to get a biopsy. 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   "   Hx of VTE/pulmonary embolism (PE)   History of subsegmental pulmonary embolism (PE) on 11/25/23  Not on anticoagulation due to GI bleed. Hx of IVC filter in the past, not clear if it was removed.  3/13 Risk for future events as not anticoagulated      Paroxysmal atrial fibrillation  Not on anticoagulation due to anemia thought to be due to a GI bleed. Has history of angioextasia and diverticulosis.   Telemetry.      Anemia, normocytic  Thrombocytopenia, resolved  No worsening of patient's chronic anemia. Hgb 11.1 and baseline 9.0.   Continue to monitor with morning labs.       Benign prostatic hypertrophy (BPH)  Continue PTA tamsulosin (Flomax) once med rec complete.   Monitor for urinary retention, bladder scan PRN.      History of GI bleed  Gastroesophageal reflux disease (GERD)  Anemia  Continue PTA famotidine, allergic to PPI.  Monitor hemoglobin as above.  Reviewed chart regarding GI bleeds.  He has AVMs and was not tolerant to anticoagulation.  He was initially on Eliquis because of an incidental PE found on imaging.  Unfortunately he developed GI bleeds and with evaluation found to have AVMs and risk for further bleeding so he could not tolerate anticoagulation  This will place him at high risk again for recurrent thrombotic complications especially in the setting of his cancer.  This adds to why he is very appropriate for hospice.     Chronic kidney disease (CKD) stage 2  Baseline creatinine around 0.8, 1.09 on admission.   Start maintenance IV fluids; suspect due to dehydration and limited PO intake; decreased from 100 mL/hr to 50 mL/hr.   Encourage PO intake.   Avoid nephrotoxic agents as able.      Depression/Anxiety/Pain  PTA Duloxetine and gabapentin doses reduced per family request last hospitalization.   Continued on duloxetine and gabapentin     Deep Tissue Pressure Injury to sacrum and bilateral buttocks  WOC consulted, appreciate the cares.   He has a significant wound on his sacrum and bilateral  buttocks.   Pictures in EPIC  Risk of breakdown   3/14 Daughter able to see pictures of sacral decubitus area and in agreement with hospice.      Weakness and deconditioning   Physical therapy (PT), CM/SW consulted, appreciate the cares.  Patient's wife believe's patient would now be served better in TCU versus home as she is unable to care for patient independently.   Last hospitalization, however, patient refused repeatedly.   3/12 attempted to talk with the patient about going to a place where he could be cared for as his wife cannot take care of him . He does not really appear to have much insight  ADDENDUM: plans for hospice and multiple family discussions     History of C. Difficile:   It was rather uncertain where this got left in terms of treatment or plans for his C. difficile.  Patient came in on Vanco 125 twice daily.  Last admission ID reported potential of tapering  Or suppression 125 twice daily  Discussion in prior notes of the potential of a stool transplant which is not realistic at this point  Will taper him down to once a day vancomycin which is filled so that this is not a concern on hospice  At this point would place him on a suppression dose of daily and there does not appear to be an absolute evidence based option  On discharge continue on vancomycin 125 mg p.o. daily.>>  Hospice can further assess    Currently awaiting hospice placement.   He is ready to go anytime.   Hospice place found but uncertain of timing.       Diet: Combination Diet Regular Diet Adult; Mechanical/Dental Soft Diet; Slightly Thick (level 1)  Snacks/Supplements Adult: Ensure Enlive; Between Meals  Room Service    DVT Prophylaxis: Pneumatic Compression Devices  Suazo Catheter: Not present  Lines: None     Cardiac Monitoring: ACTIVE order. Indication: Atrial Fibrillation  Code Status: Full Code          Clinically Significant Risk Factors              # Hypoalbuminemia: Lowest albumin = 3 g/dL at 3/7/2024 12:56 PM, will  monitor as appropriate   # Thrombocytopenia: Lowest platelets = 129 in last 2 days, will monitor for bleeding   # Hypertension: Noted on problem list    # Chronic heart failure with preserved ejection fraction: heart failure noted on problem list and last echo with EF >50%        # Severe Malnutrition: based on nutrition assessment      # Financial/Environmental Concerns:    # COPD: noted on problem list        Disposition Plan      Expected Discharge Date: 03/18/2024    Discharge Delays: Comfort Care/Hospice  *Medically Ready for Discharge  Placement - LTC    Discharge Comments: Placement with Hospice. Poss thru [facility].            INOCENCIA HALL MD  Hospitalist Service  [facility]  Securely message with [employer] (more info)  Text page via [employer] Paging/Directory   ______________________________________________________________________      Physical Exam   Vital Signs: Temp: 99.3  F (37.4  C) Temp src: Oral BP: 113/68 Pulse: 106   Resp: 16 SpO2: 94 % O2 Device: Nasal cannula Oxygen Delivery: 3 LPM  Weight: 136 lbs 3.91 oz    General Appearance:  Patient is slightly more awake   Respiratory: Decreased BS bilaterally   Cardiovascular: Regular rate with no gallop or rub  GI: + BS, soft, non tender  Skin: no overt edema       Medical Decision Making       35 MINUTES SPENT BY ME on the date of service doing chart review, history, exam, documentation & further activities per the note.    More family present today. Discussion regarding over all health and recommendations for hospice       No results found for this or any previous visit (from the past 24 hour(s)).

## 2024-03-18 NOTE — PLAN OF CARE
Discharge    Patient discharged to [facility] accepted patient today with [facility] benefits and [place] [facility].     Pt is A/ox1. Wife updated over the phone. VSS on 3 L of oxygen via NC. SOB noted. Poor appetite. Oral cares completed. BM x 1. Incontinent of urine. Mepilex to coccyx changed per plan of care, wound cares completed. Up to the chair x 1, up with one,  GB and walker.    Listed belongings gathered and given to patient (including from security/pharmacy). Yes  Care Plan and Patient education resolved: Yes  Prescriptions if needed, hard copies sent with patient  NA  Medication Bin checked and emptied on discharge Yes  SW/care coordinator/charge RN aware of discharge: Yes

## 2024-03-18 NOTE — PLAN OF CARE
Goal Outcome Evaluation:      Plan of Care Reviewed With: patient        DATE & TIME: 3/17/24-3/18/24 [id number]  Cognitive Concerns/ Orientation : A/Ox1-2, disoriented to place and situation. Confused. Forgetful. Very [place].   BEHAVIOR & AGGRESSION TOOL COLOR: Green   ABNL VS/O2: VSS on 3L NC (baseline). Two times daily vitals  MOBILITY: Assist x1 with walker and gait belt, fall risk. Turn and reposition every 2 hours while in bed.   PAIN MANAGMENT: Denies  DIET: Regular, soft and bite size, slightly thick  BOWEL/BLADDER: Incontinent of bowel and bladder at times. Also uses urinal   DRAIN/DEVICES: Peripheral IV SL left arm   SKIN: Dressing on left hand and coccyx changed 3/17. Scattered scabs and bruises.  D/C DATE: Per SW note  Patient has been accepted at [facility] in [place]. health transport will have wheelchair for transport with oxygen to SNF. Transport time set up for [id number] on 3/18/24 this is the closest time that can be set up for pt discharge. patient can be at the facility by 1 pm on Monday. [facility] has accepted the patient and they will be signing the patient on at this time. Patient will need to discharge with 2-3 day of comfort medications. Contact for [facility] is Maddison at [phone].  OTHER IMPORTANT INFO: Lung sounds diminished.

## 2024-03-18 NOTE — DISCHARGE SUMMARY
"[facility]  Hospitalist Discharge Summary      Date of Admission:  3/7/2024  Date of Discharge:  3/18/2024  Discharging Provider: Viktoria Markham  Discharge Service: Hospitalist Service    Discharge Diagnoses   hospice    Clinically Significant Risk Factors     # Severe Malnutrition: based on nutrition assessment      Follow-ups Needed After Discharge   Follow-up Appointments     Follow Up and recommended labs and tests      Follow up with Nursing home physician.  No follow up labs or test are   needed. Patient in hospice            Unresulted Labs Ordered in the Past 30 Days of this Admission       No orders found from 2/6/2024 to 3/8/2024.        These results will be followed up by     Discharge Disposition   hospice  Condition at discharge: Guarded    Hospital Course     Hermilo Velasquez is a 89 year old male with a past medical history significant for lung cancer s/p wedge resection, recurrent c.diff, afib, chronic obstructive pulmonary disease, pulmonary embolism, aortic stenosis s/p bioprosthetic heart valve, chronic kidney disease admitted on 3/7/2024 for further evaluation of altered mental status.      Patient presented to the ED hemodynamically stable. SpO2 93% on 5 L NC (chronically on 3L O2 baseline). Temperature 98.9. Per EMS and patient's wife, patient's health has been declining over past three months. He experienced a fall 03/04/2024 for which he was seen in the ED and had a negative head CT. Wife states patient sat down in a chair on afternoon (03/06/2024) and essentially hasn't gotten up since. He was noted to be non-verbal upon EMS arrival, although he responded to pain. Upon arrival to [facility] ED, patient open eyes to voice and will respond verbally with \"yes or no.\" Wife notes while patient has had difficulty communicating since his nodule in his mouth has grown larger, he does usually communicate regularly with her at home. Patient is very lethargic upon " examination and unable to provide his own history. Labs in the ED generally unremarkable with no leukocytosis. Procalcitonin 0.79 (improved from 21.39 from 02/19 hospital stay). Lactate WNL (1.9). Blood cultures obtained .  No significant electrolyte abnormality or worsening of his chronic anemia (hgb 11.1 and baseline 9.0). Glucose 116. . VBGs notable for a pH 7.36, CO2 67, Bicarb 38. UA and urine drug screen obtained and negative for infection. Influenza, COVID and RSV pending. Ketone quantitative <0.18. Urine drug screen negative. CXR demonstrated stable size of cardiac silhouette status post median sternotomy and aortic valve replacement. Slight improvement in aeration in the right lower lobe with decreased conspicuity of airspace disease since prior exam. Persistent small bilateral pleural effusions, right larger than left. No pneumothorax. CT negative for acute intracranial pathology.      Of note, patient has history of encephalopathy during several past hospital stays, most recently for sepsis secondary to bilateral pneumonia 02/19/2024 - 02/28/2024.      Failure to thrive  Goals of care  7th admission since 08/2023 with continued ongoing decline at home. TCU has been recommended multiple times and at every discharge, however, patient continues to decline. Palliative care has been included in past hospitalizations to discuss goals of care. Patient wants restorative care including full code. Wife this admit stating she is unable to manage patient at home and is agreeable to TCU, should patient be appropriate for TCU placement. Palliative care consult again requested and conference 3/11 with plans for hospice. Initially considered US biopsy of cervical LN but later transition to hospice.     Severe aortic stenosis s/p bioprosthetic aortic valve  Hypertension  Hyperlipidemia  Elevated BNP  Chronically elevated troponin  Hx of orthostatic hypotension  Acute encephalopathy   Suspect dementia with occasional  delirium and agitation     Acute on chronic hypoxic respiratory failure on 3L O2 at home  Chronic obstructive pulmonary disease (COPD)  Recent hospitalization fro sepsis secondary to bilateral pneumonia   FDG avid soft tissue nodule in the right tongue base  Dysphagia, modified diet  Suspect secondary to soft tissue nodule noted above   Adenocarcinoma of the right lung s/p wedge resection 2019  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 Hx of IVC filter in the past, not clear if it was removed.     Paroxysmal atrial fibrillation  Not on anticoagulation due to anemia thought to be due to a GI bleed. Has history of angioextasia and diverticulosis.         Benign prostatic hypertrophy (BPH)  Continue PTA tamsulosin (Flomax) once med rec complete.   Monitor for urinary retention, bladder scan PRN.      History of GI bleed  Gastroesophageal reflux disease (GERD)  Anemia  He was initially on Eliquis because of an incidental PE found on imaging.  Unfortunately he developed GI bleeds and with evaluation found to have AVMs and risk for further bleeding so he could not tolerate anticoagulation  This will place him at high risk again for recurrent thrombotic complications especially in the setting of his cancer.  This adds to why he is very appropriate for hospice.        History of C. Difficile:   It was rather uncertain where this got left in terms of treatment or plans for his C. difficile.  Patient came in on Vanco 125 twice daily.  Last admission ID reported potential of tapering  Or suppression 125 twice daily  Discussion in prior notes of  the potential of a stool transplant which is not realistic at this point  Will taper him down to once a day vancomycin which is filled so that this is not a concern on hospice  At this point would place him on a suppression dose of daily and there does not appear to be an absolute evidence based option  On discharge continue on vancomycin 125 mg p.o. daily.>>  Hospice can further assess    Consultations This Hospital Stay   PHYSICAL THERAPY ADULT IP CONSULT  CARE MANAGEMENT / SOCIAL WORK IP CONSULT  VASCULAR ACCESS ADULT IP CONSULT  WOUND OSTOMY CONTINENCE NURSE  IP CONSULT  SPEECH LANGUAGE PATH ADULT IP CONSULT  PALLIATIVE CARE ADULT IP CONSULT  HEMATOLOGY & ONCOLOGY IP CONSULT  INTERVENTIONAL RADIOLOGY ADULT/PEDS IP CONSULT  INFECTIOUS DISEASES IP CONSULT    Code Status   No CPR- Do NOT Intubate    Time Spent on this Encounter   IViktoria, personally saw the patient today and spent greater than 30 minutes discharging this patient.       Viktoria Markham  [place] [address] [facility]  [address] [place] [address]  Phone: [phone]  ______________________________________________________________________    Physical Exam   Vital Signs: Temp: 98.4  F (36.9  C) Temp src: Axillary BP: 124/69 Pulse: 66   Resp: 16 SpO2: 94 % O2 Device: Nasal cannula Oxygen Delivery: 3 LPM  Weight: 136 lbs 3.91 oz  Constitutional: Awake, no apparent distress  Respiratory: BL basal decreased BS  GI: Normal bowel sounds, soft, non-distended, non-tender  Skin/Integumen: No rashes, no cyanosis, no edema  Other:alert not oriented       Primary Care Physician   Karson Bishop    Discharge Orders      Primary Care - Care Coordination Referral      General info for SNF    Length of Stay Estimate: Long Term Care  Condition at Discharge: Terminal  Level of care:skilled   Rehabilitation Potential: Poor  Admission H&P remains valid and up-to-date: Yes  Recent Chemotherapy: N/A  Use Nursing Home Standing  "Orders: Yes     Mantoux instructions    Give two-step Mantoux (PPD) Per Facility Policy Yes     Follow Up and recommended labs and tests    Follow up with Nursing home physician.  No follow up labs or test are needed. Patient in hospice     Reason for your hospital stay    Failure to thrive     Activity - Up with assistive device     Brief Discharge Instructions    Sacrum wound: Daily   1. Clean wound with saline or MicroKlenz Spray, pat dry  2. Wipe / \"clean\" the surrounding periwound tissue with skin prep (Cavilon No Sting Skin Prep #614200) and allow to dry. This will help protect periwound and help dressing adherence  3. Apply Aquacel Ag (812027) to wound bed.  4. Press a Mepilex Sacral to the area, making sure to conform nicely to skin curvatures.   5. Time and date dressing change     Left elbow wound: Q5D  1. Cleanse with wound cleanser. Pat dry.   2. Cover with oil emulsion gauze.  3. Secure with optifoam gentle with bordered dressing.  4. Time and date     Left hand wound: Every other day  Moisten silver alginate with saline for easy removal  1. Cleanse with wound cleanser. Pat dry.  2. Lightly moisten Hydrofera blue with saline and apply (961645) to wound bed.  3. Cover with optifoam gentle with bordered dressing   4. Time and date.     Pressure Injury Prevention (PIP) Plan:  If patient is declining pressure injury prevention interventions: Explore reason why and address patient's concerns, Educate on pressure injury risk and prevention intervention(s), If patient is still declining, document \"informed refusal\" , and Ensure Care team is aware ( provider, charge nurse, etc)  Mattress: Follow bed algorithm, reassess daily and order specialty mattress, if indicated.  HOB: Maintain at or below 30 degrees, unless contraindicated  Repositioning in bed: Every 1-2 hours , Left/right positioning; avoid supine, and Raise foot of bed prior to raising head of bed, to reduce patient sliding down (shear)  Heels: Keep " elevated off mattress and Pillows under calves  Protective Dressing: Sacral Mepilex for prevention (#499411),  especially for the agitated patient   Positioning Equipment: None  Chair positioning: Chair cushion (#309169)  and Assist patient to reposition hourly   If patient has a buttock pressure injury, or high risk for PI use chair cushion or SPS.  Moisture Management: Perineal cleansing /protection: Follow Incontinence Protocol, Avoid brief in bed, Clean and dry skin folds with bathing , Consider InterDry (#497000) between folds, and Moisturize dry skin  Under Devices: Inspect skin under all medical devices during skin inspection , Ensure tubes are stabilized without tension, and Ensure patient is not lying on medical devices or equipment when repositioned  Ask provider to discontinue device when no longer needed.     No CPR- Do NOT Intubate     Oxygen (SNF/TCU) Discharge    On chronic oxygen PTA     Fall precautions     Diet    Follow this diet upon discharge: Orders Placed This Encounter      Snacks/Supplements Adult: Ensure Enlive; Between Meals      Room Service      Combination Diet Regular Diet Adult; Soft and Bite Sized Diet (level 6); Slightly Thick (level 1)       Significant Results and Procedures   Results for orders placed or performed during the hospital encounter of 03/07/24   Head CT w/o contrast    Narrative    CT OF THE HEAD WITHOUT CONTRAST  3/7/2024 1:20 PM     COMPARISON: Head CT 3/4/2024.    HISTORY: Generalized weakness, somnolence.    TECHNIQUE: 5 mm thick axial CT images of the head were acquired  without IV contrast material.    FINDINGS:  There is moderate diffuse cerebral volume loss. There are  subtle patchy areas of decreased density in the cerebral white matter  bilaterally that are consistent with sequela of chronic small vessel  ischemic disease.     The ventricles and basal cisterns are within normal limits in  configuration given the degree of cerebral volume loss.  There is  no  midline shift. There are no extra-axial fluid collections.     No intracranial hemorrhage, mass or recent infarct.    The visualized paranasal sinuses are well-aerated. There is no  mastoiditis. There are no fractures of the visualized bones.       Impression    IMPRESSION:  Diffuse cerebral volume loss and cerebral white matter  changes consistent with chronic small vessel ischemic disease. No  evidence for acute intracranial pathology.      Radiation dose for this scan was reduced using automated exposure  control, adjustment of the mA and/or kV according to patient size, or  iterative reconstruction technique.    SMITA CHAVEZ MD         SYSTEM ID:  [id number]   XR Chest Port 1 View    Narrative    XR CHEST PORT 1 VIEW   3/7/2024 1:08 PM     HISTORY: altered mental status, recent history pneumonia/pleural  effusion    COMPARISON: CT and radiograph 2/19/2024      Impression    IMPRESSION: Stable size of cardiac silhouette status post median  sternotomy and aortic valve replacement. Slight improvement in  aeration in the right lower lobe with decreased conspicuity of  airspace disease since prior exam. Persistent small bilateral pleural  effusions, right larger than left. No pneumothorax. Bones are  unchanged.    DAKOTA COVARRUBIAS MD         SYSTEM ID:  [id number]     *Note: Due to a large number of results and/or encounters for the requested time period, some results have not been displayed. A complete set of results can be found in Results Review.       Discharge Medications   Discharge Medication List as of 3/18/2024 11:16 AM        START taking these medications    Details   albuterol (PROVENTIL) (2.5 MG/3ML) 0.083% neb solution Take 1 vial (2.5 mg) by nebulization every 4 hours as needed for shortness of breath, wheezing or cough, No Print Out      morphine 10 MG/5ML solution Take 0.5 mLs (1 mg) by mouth every 4 hours as needed for pain or moderate to severe pain, Disp-10 mL, R-0, E-Prescribe      OLANZapine  (ZYPREXA) 2.5 MG tablet Take 1 tablet (2.5 mg) by mouth every 12 hours as needed (agitation), Disp-6 tablet, R-0, E-PrescribeHospice      umeclidinium (INCRUSE ELLIPTA) 62.5 MCG/ACT inhaler Inhale 1 puff into the lungs daily, No Print Out           CONTINUE these medications which have CHANGED    Details   acetaminophen (TYLENOL) 325 MG tablet Take 1-2 tablets (325-650 mg) by mouth every 6 hours as needed for mild pain, No Print Out      DULoxetine 40 MG CPEP Take 40 mg by mouth daily, No Print Out      famotidine (PEPCID) 20 MG tablet Take 1 tablet (20 mg) by mouth daily, No Print Out      fludrocortisone (FLORINEF) 0.1 MG tablet Take 1 tablet (0.1 mg) by mouth daily, No Print Out      gabapentin (NEURONTIN) 300 MG capsule Take 1 capsule (300 mg) by mouth at bedtime, No Print Out      ondansetron (ZOFRAN ODT) 4 MG ODT tab Take 1 tablet (4 mg) by mouth every 6 hours as needed for nausea, Disp-10 tablet, R-0, Local Print      vancomycin (FIRVANQ) 50 MG/ML oral solution Take 2.5 mLs (125 mg) by mouth daily, No Print Out           CONTINUE these medications which have NOT CHANGED    Details   albuterol (PROAIR HFA/PROVENTIL HFA/VENTOLIN HFA) 108 (90 Base) MCG/ACT inhaler Inhale 2 puffs into the lungs every 6 hours as needed, Historical      tamsulosin (FLOMAX) 0.4 MG capsule Take 1 capsule (0.4 mg) by mouth daily, No Print Out           STOP taking these medications       atorvastatin (LIPITOR) 10 MG tablet Comments:   Reason for Stopping:         calcium carbonate (TUMS) 500 MG chewable tablet Comments:   Reason for Stopping:         ferrous sulfate (FE TABS) 325 (65 Fe) MG EC tablet Comments:   Reason for Stopping:         ofloxacin (FLOXIN) 0.3 % otic solution Comments:   Reason for Stopping:         potassium chloride ER (KLOR-CON M) 20 MEQ CR tablet Comments:   Reason for Stopping:         tiotropium (SPIRIVA) 18 MCG inhaled capsule Comments:   Reason for Stopping:             Allergies   Allergies   Allergen  "Reactions    Omeprazole Itching    Pantoprazole Itching    Prevacid [Lansoprazole] Itching    Lasix [Furosemide] Rash    Lidocaine Blisters and Rash     Allergy to lidocaine ointment  Allergy to lidocaine ointment      Penicillin G Rash    Penicillins Rash     \"broke out from injection\" 60 yrs ago  Tolerates cephalosporins     "

## 2024-03-18 NOTE — PLAN OF CARE
Goal Outcome Evaluation:      3/17/2024 [id number]  Cognitive Concerns/ Orientation : A/Ox1-2, disoriented to place and situation. Confused. Forgetful. Very [place].   BEHAVIOR & AGGRESSION TOOL COLOR: Green   ABNL VS/O2: VSS on 3L NC (baseline). Two times daily vitals  MOBILITY: Assist x1 with walker and gait belt, fall risk. Turn and reposition every 2 hours while in bed.   PAIN MANAGMENT: Denies  DIET: Regular, soft and bite size, slightly thick  BOWEL/BLADDER: Incontinent of bowel and bladder at times. Also uses urinal   DRAIN/DEVICES: Peripheral IV SL left arm   SKIN: Dressing on left hand changed today, elbow dressing CDI. Dressing on coccyx changed this am.  Scattered scabs and bruises.  D/C DATE: Per SW note  Patient has been accepted at [facility] in [place]. health transport will have wheelchair for transport with oxygen to SNF. Transport time set up for [phone] on 3/18/24 this is the closest time that can be set up for pt discharge. patient can be at the facility by 1 pm on Monday. [place] [facility] has accepted the patient and they will be signing the patient on at this time. Patient will need to discharge with 2-3 day of comfort medications. Contact for [facility] is [place] at [phone].  OTHER IMPORTANT INFO: Lung sounds diminished.

## 2024-03-19 NOTE — TELEPHONE ENCOUNTER
MTM appointment no showed, we made one more attempt to reschedule.     Routing back to referring provider and MTM pharmacist.     [employer] Message Sent    Brandee Haney - [facility]    [phone]

## 2024-04-04 NOTE — PLAN OF CARE
Oriented x4.  VSS.  On 2 liters oxygen via nasal cannula (baseline 2 liters oxygen).  Still need sputum sample.  Takes pills well with water.  IV saline locked.  Intermitten IV antibiotics.  Baseline neuropathy.  Ambulate with standby assistance.  Voiding via urinal.  Nursing will continue to monitor.      home

## 2024-11-01 NOTE — PROCEDURE: LIQUID NITROGEN
Add 52 Modifier (Optional): no
Duration Of Freeze Thaw-Cycle (Seconds): 15-20
Medical Necessity Clause: This procedure was medically necessary because the lesions that were treated were:
Detail Level: Detailed
Consent: The patient's consent was obtained including but not limited to risks of crusting, scabbing, blistering, scarring, darker or lighter pigmentary change, recurrence, incomplete removal and infection.
Number Of Freeze-Thaw Cycles: 1 freeze-thaw cycle
Post-Care Instructions: I reviewed with the patient in detail post-care instructions. Patient is to wear sunprotection, and avoid picking at any of the treated lesions. Pt may apply Vaseline to crusted or scabbing areas.
Medical Necessity Information: It is in your best interest to select a reason for this procedure from the list below. All of these items fulfill various CMS LCD requirements except the new and changing color options.
done

## 2024-12-20 NOTE — PROGRESS NOTES
ANTICOAGULATION FOLLOW-UP CLINIC VISIT    Patient Name:  Hermilo Velasquez  Date:  2020  Contact Type:  Face to Face    SUBJECTIVE:  Patient Findings     Comments:   Patient is off oxygen now and COPD has stabilized         Clinical Outcomes     Negatives:   Major bleeding event, Thromboembolic event, Anticoagulation-related hospital admission, Anticoagulation-related ED visit, Anticoagulation-related fatality    Comments:   Patient is off oxygen now and COPD has stabilized            OBJECTIVE    INR Protime   Date Value Ref Range Status   2020 1.7 (A) 0.86 - 1.14 Final       ASSESSMENT / PLAN  No question data found.  Anticoagulation Summary  As of 2020    INR goal:   2.0-3.0   TTR:   50.3 % (10.9 mo)   INR used for dosin.7! (2020)   Warfarin maintenance plan:   7.5 mg (5 mg x 1.5) every Thu; 5 mg (5 mg x 1) all other days   Full warfarin instructions:   7.5 mg every Thu; 5 mg all other days   Weekly warfarin total:   37.5 mg   Weekly max warfarin dose:   45 mg   Plan last modified:   Tammy Peters RN (2020)   Next INR check:   2020   Target end date:   Indefinite    Indications    Long-term (current) use of anticoagulants [Z79.01] [Z79.01]  Pulmonary embolism  bilateral (H) [I26.99]  Paroxysmal atrial fibrillation (H) [I48.0]             Anticoagulation Episode Summary     INR check location:   Anticoagulation Clinic    Preferred lab:       Send INR reminders to:   TERRIE JACKSON    Comments:         Anticoagulation Care Providers     Provider Role Specialty Phone number    Karson Bishop MD [facility] [phone]            See the Encounter Report to view Anticoagulation Flowsheet and Dosing Calendar (Go to Encounters tab in chart review, and find the Anticoagulation Therapy Visit)    Dosage adjustment made based on physician directed care plan.    INR is 1.7.  Will increase maintenance dose ~7% and recheck INR in 2 weeks.    Tammy Peters,  PHYSICAL MEDICINE AND REHABILITATION  INITIAL EVALUATION          Consults   Consulting Physician: Dr. Palak Karimi  Referring Physician: No ref. provider found  Other Physicians: Patient Care Team:  Gay Myrick CNP as PCP - General (Nurse Practitioner - Family)  Yannick Fraga MD as Referring Provider (Internal Medicine)  Jordan Santamaria MD as Referring Provider (Cardiovascular Disease)    Reason for Consultation: Rehabilitation Recommendations    Chief Complaint: weakness    History was obtained from chart review and from patient.    History Of Present Illness  Patient is a 89 year old male presenting with weakness..EP: Dr. Bryan  Cardiology: Dr. Hermes Hwang  Hospitalist: Dr. Galvan/Flakito  Nephrology: Dr. Mandel     Patient lives independently in the community with caregiver assist.  He has a caregiver that comes in in the morning and another 1 that comes in in the afternoon.  He has otherwise been independent with no history of multiple falls until recently.  Patient has a past medical history significant for CHF and hypertension.  He complained of increased shortness of breath for a couple days associated with increased lower extremity edema.  He had not been exercising much recently.  On December 10 seen by his primary care physician for management of stage II pressure injury left buttock, fecal impaction.  Patient also with complaints of cough.  Patient states that he tripped over a stool around 9 PM.  He usually uses a stool to prop up his feet.  After he tripped he fell onto the ground and could not get himself up so he decided to on the floor until his caregiver came in in the morning.  There was no head trauma.  Patient admitted to [facility] on December 17.  In the emergency room blood pressures elevated, troponin 22, proBNP 672, creatinine 1.57.  Hemoglobin 10.9.  Chest x-ray showed evidence of cardiomegaly, left hemidiaphragm significantly elevated, left lung  RN                  atelectasis.  Seen by cardiologist, started on IV diuretics.  Seen by nephrology for acute kidney injury.  Lasix placed on hold.  Complained of lightheadedness and found to be orthostatic.  Complains of shortness of breath, dysuria, left-sided abdominal pain.  Started on empiric ceftriaxone for suspected UTI.  Renal ultrasound unremarkable.  Prior to this admission patient was independent with use of a rolling walker.  Recently has needed more assistance with bathing and lower body dressing.  Denies a history of multiple falls.  He has been evaluated by PT and OT.  Acute rehab recommended      Past Medical History  Past Medical History:   Diagnosis Date    Arthritis     CAD (coronary artery disease) 2006    Congestive cardiac failure  (CMD)     COVID-19 vaccine series completed 05/14/2021    HLD (hyperlipidemia)     HTN (hypertension)     Primary insomnia     Pulmonary emphysema  (CMD)     SOB (shortness of breath) on exertion     <4METS    Vitamin D deficiency        Old right-sided rib fractures    Surgical History  Past Surgical History:   Procedure Laterality Date    Back surgery      LUMBAR FUSION WITH HARDWARE    Cardiac catherization  03/28/2006    Cholecystectomy      Extracapsular cataract removal w insert io lens prosth wo ecp Bilateral     Hernia repair      INGUINAL - WITH MESH    Nasal scopy,open maxill sinus      Skin lesion excision Left 08/30/2024    Excision L buttock lesion.       Family History:  Family History   Problem Relation Age of Onset    Patient is unaware of any medical problems Mother     Motor Vehicle Accident Father     Hyperlipidemia Brother     Hypertension Brother     Heart disease Brother     Cancer Brother          Social History  Patient  reports that he has quit smoking. His smoking use included cigarettes. He has quit using smokeless tobacco. He reports that he does not drink alcohol and does not use drugs.      Prior Living Situation:  Prior Living Situation  Type of Home:  House  Home Layout: One level, Work area in basement  # Steps to Enter: 2  Rails to Enter: 2 - able to reach both at same time  # Steps in the Home: 8 (goes down with caregiver or someone (last down 3 weeks ago))  Rails in Home: 2 - able to reach both at same time  Lives With: Alone  Receives Help From: Personal care attendant, Other (comment) (2 caretakers come in 2hrs in am and pm 7 days a week.)  Transportation: Other (comment) (caregiver)  Bathroom Shower/Tub: Tub/Shower unit  Bathroom Toilet: Raised (counter and grab bar near toilet)  Bathroom Equipment: Tub transfer bench, Grab bars in shower, Hand-held shower  Bathroom Accessibility: Entry Level, Accessible  Home Equipment: Cane, Two-wheeled walker, Wheelchair-manual, Four-wheeled walker (electric lift recliner)      Prior Communication and Cognition:  Prior Communication/Cognition  Prior Cognition: Intact    Prior Level of Function:  Prior Function  Prior ADL:  (increased assistance with ADLs over the past 2 weeks)  Eating: Independent  Grooming: Modified Independent  Bathing: Moderate Assist (Mod)  Upper Body Dressing: Modified Independent  Lower Body Dressing: Modified Independent  Toileting: Modified Independent  Bed Mobility: Independent  Transfers: Modified Independent  Toilet Transfers: Modified Independent (uses 2WW)  Tub/Shower Transfers: Modified Independent  Tub/Shower Transfer Equipment: Tub bench  Ambulation in the Home: Modified  Independent (WW)  Ambulation in the Community: Modified Independent (rollator)  Steps into the Home: Modified Independent  Transfer Equipment: Four wheeled walker, Two wheeled walker  Locomotion Equipment: Four wheeled walker, Two wheeled walker  History of Falls in past year: No  Prior Homemaking/IADLs: Needs assistance  Meal Prep: Modified Independent (increased assist with meals x2 weeks)  Hearing: Wears hearing aids right, Wears hearing aids left, Hard of hearing  Vision: Wears glasses all the time    Current  Functional Status:   Blood Pressure (mmhg):      - Supine: 132/74     - Seated: 115/66     - Standin/64  Seated on toilet 143/80  Seated on recliner after standing at sink for oral and hand hygiene 101/62; symptomatic with dizziness-RN notified     Heart rate ranged from 68-77  O2 93-94%      Patient Activity Tolerance: 1 to 2 activity to rest     Hand Dominance: right-handed     Skin:   Left medial ankle skin tear noted, bleeding stopped and bandage applied-RN notified     Observation   Sabas LE edema noted  Patient reports burning and pain during urination, OT noted blood in external catheter while urinatin-RN notified     Range of Motion (ROM)   (degrees unless noted; active unless noted; norms in ( ); negative=lacking to 0, positive=beyond 0)  WFL: SARITHAE, RUEFREN  Shoulder:   - Functional ROM:       - Place hand on opposite shoulder:   Left: Normal   Right: Normal        - Touch top of head:   Left: Normal   Right: Normal        - Place hand behind neck:   Left: Normal   Right: Normal        - Place hand behind back:   Left: Normal   Right: Normal        - Over head reach:   Left: Normal   Right: Normal       Strength  (out of 5 unless noted, standard test position unless noted)   WFL: MELODY RUSSELL  Gross :  strength grossly equal bilateral        Sitting Balance  (TREY = base of support)  Static      - Trial 1 details: independent  Dynamic      - Trial 1 details: supervision, reaches with 2 hands and with back unsupported     Standing Balance  (TREY = base of support)  Firm Surface: Double Leg      - Static, Eyes Open       - Trial 1 details: stand by assist and with double UE support     - Dynamic, Eyes Open       - Trial 1 details: contact guard, without UE support, reaches with 2 hands and reaches out of TREY        Bed Mobility  - Supine to sit: modified independent (increased time; head of bed flat no use of bed rail)  Transfers  Assistive devices: gait belt, 2-wheeled walker  - Sit to stand: stand by  assist, with verbal cues  - Stand to sit: stand by assist, with verbal cues  Verbal cues for hand placement on rolling walker       Functional Ambulation  - Assistance: with verbal cues and contact guard/touching/steadying assist  - Assistive device: gait belt and 2-wheeled walker  - Distance (ft):15; 20  Verbal cues to walk within frame of walker  Activities of Daily Living (ADLs)  Eating:   - Assist: independent  - Position: chair  Grooming/Oral Hygiene:   - Grooming assist: contact guard/touching/steadying assist and with verbal cues  - Position: standing at sink (patient educated on sitting at sink due to impaired activity tolerance and low BP)  Upper Body Dressing:  - Assist: set up  - Position: edge of bed  Lower Body Dressing:   - Assist: moderate assist and with verbal cues  - Position: edge of bed  - Assist needed for: don/doff right sock, don/doff right shoe and pull up over hips  Toileting:   - Toilet transfer:        - Assist: with verbal cues and contact guard/touching/steadying assist (cues to line up in front of toilet and for hand placement)       - Device: gait belt and 2-wheeled walker       - Equipment: grab bar use  - Assist: minimal assist (assist for thoroughness due to impaired activity tolerance)  - Assist needed for: clothing management up  Tub Transfer:   - Assist: contact guard/touching/steadying assist (simulated)  - Pattern: sitting transfer  - Equipment: tub transfer bench  Bathing:   - Assist: minimal assist and with verbal cues  - Position: sitting in shower  - Assist needed for: increased time to complete, steadying and verbal cueing  - Equipment: grab bar, shower transfer bench and hand held shower    Allergies  ALLERGIES:  Tetanus toxoids    Medications  Current Facility-Administered Medications   Medication    lidocaine (LIDOCARE) 4 % patch 1 patch    Potassium Standard Replacement Protocol (Levels 3.5 and lower)    Magnesium Standard Replacement Protocol     umeclidinium-vilanterol (ANORO ELLIPTA) 62.5-25 MCG/ACT inhaler 1 puff    aspirin (ECOTRIN) enteric coated tablet 81 mg    cyanocobalamin (Vitamin B-12) tablet 1,000 mcg    pantoprazole (PROTONIX) EC tablet 40 mg    atorvastatin (LIPITOR) tablet 40 mg    tamsulosin (FLOMAX) capsule 0.4 mg    albuterol inhaler 2 puff    [Held by provider] heparin (porcine) injection 5,000 Units    ondansetron (ZOFRAN) injection 4 mg    acetaminophen (TYLENOL) tablet 650 mg    docusate sodium-sennosides (SENOKOT S) 50-8.6 MG 2 tablet    aluminum-magnesium hydroxide-simethicone (MAALOX) 200-200-20 MG/5ML suspension 30 mL    sodium chloride 0.9 % injection 10 mL    [Held by provider] furosemide (LASIX INJECT) injection 40 mg    melatonin tablet 3 mg       Review of Systems  Review of Systems no headache or dizziness.  No chest pain or palpitations.  No shortness of breath with exertion.  Pain complains of abdominal cramping, loose BM overnight.  At home states that he tends to elevate his feet with 4 pillows to help manage his edema.  Denies a history of multiple falls.  Recall that his last fall was in his yard while raking leaves.    Last Recorded Vitals  Blood pressure 118/55, pulse 64, temperature 97.9 °F (36.6 °C), temperature source Oral, resp. rate 16, height 5' (1.524 m), weight 76.8 kg (169 lb 5 oz), SpO2 94%.  Height and Weight  Height: 5' (152.4 cm)  Weight: 76.8 kg (169 lb 5 oz)  Dosing Weight: 79.7 kg (175 lb 11.3 oz)  BSA (Calculated - sq m): 1.84  BMI (Calculated): 33.07  Body mass index is 33.07 kg/m².      Physical Exam  Physical Exam well-nourished, well-developed elderly male sitting up in a bedside chair in no acute distress, calm and cooperative, alert and oriented no dysarthria or aphasia  HEENT: He is wearing glasses, hearing aids  Cardiovascular: Regular rate and rhythm  Lungs: Faint bibasilar crackles, no wheezes  Abdomen: Soft, nontender  Extremities: Positive lower extremity edema, no upper extremity edema.   No calf tenderness  Vascular: Radial pulse are palpable  Motor: 3-4 out of 5 in the upper and lower extremities.  No focal deficits noted  Sensation is intact in the upper and lower extremities  Musculoskeletal: Arthritic changes in the hands knees and feet.  No gross effusions noted.  Range of motion in the shoulders is limited, limited range of motion bilateral lower extremities  Spine: No palpable tenderness  Cerebellar: No hand tremor  Skin: Scattered bruising left upper extremity, left elbow.  Right upper extremity with peripheral IV in place      Wound Documentation:  Wound Treatment and Goals (most recent)       Wound Treatment and Goals    No documentation.                   Diet:  One Time Diet Cardiac  Cardiac; Fluid Restrict 1800ml (1020 From Dietary) Diet    Labs:   No results displayed because visit has over 200 results.             Imaging:  LAST ECHO/ECHO STRESS:  No valid procedures specified.    LAST MRI:  No results found for this or any previous visit.    LAST CT:  === 12/01/24 ===    CT ABDOMEN PELVIS W CONTRAST    - Narrative -  PROCEDURE:  CT ABDOMEN PELVIS W CONTRAST    COMPARISON: 8/20/2017 CTA abdomen    INDICATIONS: RUQ pain    TECHNIQUES: Multiple axial images of CT ABDOMEN PELVIS W CONTRAST were  obtained. 75 cc of Omnipaque was infused.  Multiplanar reconstructed images  were also acquired. Adjustment of the mA and/or kV was done based on the  patient's size and age. The study is degraded due to motion.    FINDINGS: The liver shows mildly decreased attenuation without mass,  abnormal enhancement, or intra ductal dilatation. The gallbladder has been  surgically removed. The pancreas, spleen, and adrenal glands are  unremarkable.    Kidneys are normal in size and position with mildly lobulated contour and  normal and symmetrical cortical perfusion without mass, hydronephrosis or  hydroureter. The bladder is unremarkable.    Small bowel loops are unremarkable. Large bowel loops not well  aerated due  to motion artifacts. There is large amount of stool in the colon and rectum  with fecal impaction in the sigmoid colon and rectum.    No ascites or free air is seen. Small lymph nodes in the mesentery and  retroperitoneal space are nonspecific. There are postsurgical changes of  right inguinal hernia repair with without evidence for recurrence.    There is elevation of the left diaphragm with mild right lung base  subpleural ectasis. The left lung base is clear. There is diffuse  osteopenia/osteoporosis and spondylosis of spine. Postsurgical changes of  posterior fusion and the compression at L3-S1 level.    - Impression -  1. Constipation with fecal impaction in the sigmoid colon and rectum. No  bowel obstruction.  2. Fatty filtration of the liver without mass.  3. Elevation of the left diaphragm with left lung base subpleural ectasis.  4.    Electronically Signed by: KATIE FARIA MD  Signed on: 12/1/2024 6:34 PM  Workstation ID: [id number]      === 11/28/20 ===    CT ADVOCATE PROCEDURE    - Narrative -  PROCEDURES: CT Pulmonary Angio    CLINICAL INDICATIONS: sob, cp    COMPARISON: Chest x-ray on the same date    TECHNIQUES: Multiple axial images of CT pulmonary angiogram were  obtained with multiplanar and 3-D reconstruction. 70 cc Omnipaque was  infused. Adjustment of the mA and/or kV was done based on the  patient's size.    - Impression -  The study is degraded due to moderate breathing motion.  The pulmonary artery and major branches are normally opacified without  evidence for PE.  The heart size is normal.  There is no pericardial  effusion.  The thoracic aorta shows normal caliber.    The lungs are mildly hyper aerated with moderate breathing motion.  There is suggestion of small peribronchial opacity in the left lung  base and right lung base posteriorly.  There is no discrete mass or  acute consolidations.  No pneumothorax, pleural effusion, or  lymphadenopathy in the mediastinum are  detected.  There is significant  elevation of the left diaphragm.    The visualized liver shows decreased attenuation without mass or  abnormal enhancement.  There is diffuse osteoporosis and moderate  spondylosis of thoracolumbar spine.    IMPRESSION:  1.  No CT evidence for PE.  2.  The evaluation of the lung parenchyma is limited due to  significant breathing motion.  There is small subpleural/peribronchial  opacities in the bilateral lung base, most likely atelectasis.  The  rest of the lungs is without acute consolidations.  3.  Please see detailed discussion.      Dictated by:  Sherri Bernabe MD  Electronically Signed By:  Sherri Bernabe MD  on  11/28/2020 12:06  Technologist Initials:  DOUG IRVIN    LAST EKG:    Encounter Date: 12/17/24   Electrocardiogram 12-Lead   Result Value    Ventricular Rate EKG/Min (BPM) 81    Atrial Rate (BPM) 81    CT-Interval (MSEC) 220    QRS-Interval (MSEC) 100    QT-Interval (MSEC) 358    QTc 415    P Axis (Degrees) -8    R Axis (Degrees) -44    T Axis (Degrees) 44    REPORT TEXT      Sinus rhythm  with 1st degree AV block  Left axis deviation  Possible  Lateral infarct  (cited on or before  01-DEC-2024)  Abnormal ECG  When compared with ECG of  01-DEC-2024 17:07,  aberrant conduction  is no longer  present  Minimal criteria for  Anteroseptal infarct  are no longer  present  Confirmed by ISAIAH REVELES M.D. ([id number]) on 12/18/2024 3:34:35 PM         LAST X-RAY:  === 12/17/24 ===    XR CHEST AP OR PA    - Narrative -  EXAM: XR CHEST AP OR PA    CLINICAL INDICATION: sob    COMPARISON: Comparison is made with the chest radiograph from 12/1/2024.  Comparison is made with the chest CT from 11/28/2020.    TECHNIQUE: Portable chest radiograph is obtained on 12/17/2024 11:08 AM.    FINDINGS:  Heart is mildly enlarged in size, unchanged. Pulmonary vasculature is  normal.  Left hemidiaphragm is significantly elevated, similar to prior exam.  There are mild subsegmental atelectatic changes at the left  lung base.  No airspace consolidation or pleural fluid collection is noted.  No pneumothorax is noted on this portable film.  Mediastinum, soft tissues and bones are unchanged on the portable film.  There are few old right-sided rib fractures, also present on prior exam.    - Impression -  1. Mild cardiomegaly.  2. Left hemidiaphragm is significantly elevated, similar to prior exam.  Mild subsegmental atelectatic changes at the left lung base.    Electronically Signed by: DARCI BEDOYA MD  Signed on: 12/17/2024 11:28 AM  Workstation ID: [id number]      === 12/01/24 ===    XR CHEST AP OR PA    - Narrative -  Procedure: XR CHEST AP OR PA    COMPARISON: 11/28/2020.    INDICATIONS: RUQ pain    TECHNIQUES: Portable portable chest x-ray at 1750 hours were obtained.    - Impression -  RESULTS/IMPRESSION: The heart size is mildly enlarged. Aorta is mildly  ectatic. The lungs are mildly under aerated. Small ectasis are noted in the  bilateral lung bases. No acute consolidations are seen. No pneumothorax or  pleural effusion evident. Again noted is marked elevation of the left  diaphragm.        Electronically Signed by: KATIE FARIA MD  Signed on: 12/1/2024 6:09 PM  Workstation ID: [id number]      === 11/28/20 ===    XR ADVOCATE PROCEDURE    - Narrative -  EXAM: XR Chest Portable    CLINICAL INDICATION: SOB    TECHNIQUE: Portable frontal view of the chest.    COMPARISON: CT abdomen/pelvis 08/20/2017.  Abdominal radiographs  08/12/2017    FINDINGS:    Cardiac silhouette is obscured on the left from significant left  hemidiaphragm elevation.  The cardiac silhouette overall appears top  normal in size.  No pleural effusion or pneumothorax.  Left  basilar/retrocardiac density presumably related to atelectasis.  Right  lung is clear.  Cardiac.  Rib deformities are noted.  Scattered  degenerative osseous findings.    - Impression -  Marked left hemidiaphragm elevation (unchanged from August 2017).  Presumed left basilar  atelectasis.  Otherwise, no acute  cardiopulmonary findings.      Dictated by:  Bradley Samuel MD  Electronically Signed By:  Bradley Samuel MD  on  11/28/2020 10:46  Technologist Initials:  GL    LAST U/S:  === 12/17/24 ===    US KIDNEYS AND BLADDER COMPLETE URINARY SYSTEM    - Narrative -  PROCEDURE: US KIDNEYS AND BLADDER COMPLETE URINARY SYSTEM    COMPARISON: CT abdomen/pelvis 4/1/2021.    INDICATIONS: ERNESTINE    TECHNIQUES: Multiple grayscale images of ultrasound kidneys and bladder  were obtained.    RESULTS: The right kidney measures 11.7 x 5.2 x 5 cm and left kidney 10.6 x  5.2 x 5.3 cm. There is mild cortical thinning bilaterally with unremarkable  echogenicity. No shadowing calculus, mass, or hydronephrosis on either  side. The bladder appears unremarkable.    - Impression -  Unremarkable ultrasound kidney renal and bladder for age    Electronically Signed by: KATIE FARIA MD  Signed on: 12/19/2024 10:17 AM  Workstation ID: [id number]        Micro/Path:  Microbiology Results       None            * Cannot find OR log *  Additional data: Reviewed old records and discussed with another healthcare provider  Precautions: Safety, falls  Rehabilitation prognosis: Good  Rehabilitation goals: Return home at a modified independent to supervision level of function for basic mobility and ADLs  Estimated length of stay: 2 weeks    ASSESSMENT/PLAN  Principal Problem:    Acute on chronic diastolic (congestive) heart failure (CMD)  Active Problems:    Benign prostatic hyperplasia without lower urinary tract symptoms    COPD without exacerbation  (CMD)    Dyslipidemia    GERD without esophagitis    Peripheral artery disease (CMD)    Essential hypertension, benign    Stage 3a chronic kidney disease  (CMD)    Macrocytic anemia    General weakness and deconditioning secondary to acute on chronic CHF.  Plan to resume low-dose diuretic once cleared by nephrology.  Recommendations to defer Jardiance due to  cost  Suspected UTI with episode of hematuria.  Patient on aspirin  Acute kidney injury with chronic kidney disease  Coronary artery disease  COPD  GERD  History of chronic constipation with impaction  Peripheral arterial disease  Buttock ulcer.  Encourage pressure-relief  Hyperlipidemia  Impaired activities of daily living and mobility secondary to above  Prior to this admission patient was ambulatory with use of a rolling walker and caregiver assistance at home.  Rehab options discussed with the patient.  He is motivated to participate in therapy and wants to be able to return home.  I agree with this PT and OT, recommend acute rehab with a goal of returning home at a modified independent to supervision level of function.  ELOS 2 weeks. May need increased caregiver assist upon returning home.  Recommendations also discussed with the discharge planner    Code Status    Code Status: Do Not Resuscitate        Palak Karimi MD

## 2025-06-25 NOTE — PROGRESS NOTES
ANTICOAGULATION FOLLOW-UP CLINIC VISIT    Patient Name:  Hermilo Velasquez  Date:  2020  Contact Type:  Face to Face    SUBJECTIVE:  Patient Findings     Comments:   The patient was assessed for diet, medication, and activity level changes, missed or extra doses, bruising or bleeding, with no problem findings.          Clinical Outcomes     Negatives:   Major bleeding event, Thromboembolic event, Anticoagulation-related hospital admission, Anticoagulation-related ED visit, Anticoagulation-related fatality    Comments:   The patient was assessed for diet, medication, and activity level changes, missed or extra doses, bruising or bleeding, with no problem findings.             OBJECTIVE    INR Protime   Date Value Ref Range Status   2020 2.1 (A) 0.86 - 1.14 Final       ASSESSMENT / PLAN  No question data found.  Anticoagulation Summary  As of 2020    INR goal:   2.0-3.0   TTR:   47.1 % (10.9 mo)   INR used for dosin.1 (2020)   Warfarin maintenance plan:   7.5 mg (5 mg x 1.5) every Thu; 5 mg (5 mg x 1) all other days   Full warfarin instructions:   7.5 mg every Thu; 5 mg all other days   Weekly warfarin total:   37.5 mg   Weekly max warfarin dose:   45 mg   No change documented:   Tammy Peters RN   Plan last modified:   Tammy Peters RN (2020)   Next INR check:   2020   Priority:   Maintenance   Target end date:   Indefinite    Indications    Long-term (current) use of anticoagulants [Z79.01] [Z79.01]  Pulmonary embolism  bilateral (H) [I26.99]  Paroxysmal atrial fibrillation (H) [I48.0]             Anticoagulation Episode Summary     INR check location:   Anticoagulation Clinic    Preferred lab:       Send INR reminders to:   TERRIE JACKSON    Comments:         Anticoagulation Care Providers     Provider Role Specialty Phone number    Karson Bishop MD [facility] Internal Medicine [phone]            See the Encounter Report to view Anticoagulation Flowsheet and  Dosing Calendar (Go to Encounters tab in chart review, and find the Anticoagulation Therapy Visit)    Dosage adjustment made based on physician directed care plan.     Pt denies any changes in diet,health or activity. Hermilo is aware if signs of clotting (pain, tenderness, swelling, color change in leg or arm, SOB) and bleeding occur (blood in stool, urine, large bruising, bleeding gums, nosebleeds) to have INR check sooner. If sx severe report to ER or concerned for stroke call 911. If general questions or concerns arise, call clinic.         Tammy Peters RN                  no

## 2025-07-16 NOTE — PROGRESS NOTES
"[facility] GERIATRICS    Chief Complaint   Patient presents with     Nursing Home Acute     HPI:  Hermilo Velasquez is a 89 year old  (11/3/1932), who is being seen today for an episodic care visit at: [facility]) [25]. Today's concern is:   DJD right knee s/p right TKA: on exam today patient sitting up in w/c, states pain in right knee is rated 6/10, states he is working with therapy, walking up to 220 feet using a RW with SBA  Cognitive impairment: BIMS 9/15, SBT not tested  Anemia: see labs  COPD: denies SOB, cough, congestion, SaO2 95-98% on room air  HTN/PAF/CKD: /67, 105/66, 103/55 with HR 70-80 range, denies CP, palpitations    Allergies, and PMH/PSH reviewed in [facility] today.  REVIEW OF SYSTEMS:  10 point ROS of systems including Constitutional, Eyes, Respiratory, Cardiovascular, Gastroenterology, Genitourinary, Integumentary, Musculoskeletal, Psychiatric were all negative except for pertinent positives noted in my HPI.    Objective:   /67   Pulse 70   Temp 97.7  F (36.5  C)   Resp 16   Ht 1.778 m (5' 10\")   Wt 71.1 kg (156 lb 12.8 oz)   SpO2 92%   BMI 22.50 kg/m    GENERAL APPEARANCE:  Alert, in no distress  ENT:  Mouth and posterior oropharynx normal, moist mucous membranes, [place]  EYES:  EOM, conjunctivae, lids, pupils and irises normal, PERRL  RESP:  respiratory effort and palpation of chest normal, lungs clear to auscultation , no respiratory distress  CV:  Palpation and auscultation of heart done , regular rate and rhythm, no murmur, rub, or gallop, peripheral edema 1+ in LE bilaterally  ABDOMEN:  normal bowel sounds, soft, nontender, no hepatosplenomegaly or other masses  M/S:   patient sitting up in w/c  SKIN:  Inspection of skin and subcutaneous tissue baseline, did not visualize surgical incision  NEURO:   speech wnl  PSYCH:  affect and mood normal    Recent labs in [facility] reviewed by me today.  and   Most Recent 3 CBC's:Recent Labs   Lab Test 07/28/22  1643 " 07/28/22  0720 07/27/22  0719   WBC 12.9* 15.1* 13.1*   HGB 9.5* 9.9* 9.5*   MCV 89 88 92    151 140*     Most Recent 3 BMP's:Recent Labs   Lab Test 07/30/22  0604 07/29/22  0608 07/28/22  0720    140 139   POTASSIUM 3.5 4.0 3.8   CHLORIDE 107 110* 111*   CO2 28 23 23   BUN 23 24 41*   CR 0.87 0.95 1.31*   ANIONGAP 3 7 5   AMRITA 8.8 8.6 8.5   * 109* 114*       Assessment/Plan:  (M17.11) Primary osteoarthritis of right knee  (primary encounter diagnosis)  (Z47.1,  Z96.651) Aftercare following right knee joint replacement surgery  (R53.81) Physical deconditioning  Comment: acute/ongoing  Plan: PT and OT, tylenol 1000mg TID scheduled , gabapentin 600mg qhs   F/u with ortho as directed    (D62) Anemia due to blood loss, acute  (D50.0) Iron deficiency anemia due to chronic blood loss  Comment: acute/ongoing  Plan: CBC on 8/5/22, continue ferrous sulfate 325mg BID    (J44.0) Chronic obstructive pulmonary disease with acute lower respiratory infection (H)  Comment: ongoing  Plan: monitor SaO2 at rest and with activity, breo ellipta inhaler 1 puff QD, albuterol MDI 1-2 puffs q 6 hours prn,     (I10) Essential hypertension with goal blood pressure less than 140/90  (I48.0) Paroxysmal atrial fib -- on Eliquis  (N18.30) Stage 3 chronic kidney disease, unspecified whether stage 3a or 3b CKD (H)  Comment: ongoing  Plan: BMP on 8/5/22, continue torsemide 10mg QD,  Metoprolol xl 12.5mg BID, apixaban 5mg BID    (R41.89) Cognitive impairment  Comment: ongoing  Plan: BIMS 9/15,  to assist in discharge planning      Orders:  No new orders      Electronically signed by: Tonya Lynn Haase, APRN CNP      no

## (undated) DEVICE — DRSG STERI STRIP 1/2X4" R1547

## (undated) DEVICE — DRAPE IOBAN INCISE 23X17" 6650EZ

## (undated) DEVICE — DECANTER VIAL 2006S

## (undated) DEVICE — SYR 30ML LL W/O NDL 302832

## (undated) DEVICE — SUCTION DRY CHEST DRAIN OASIS 3600-100

## (undated) DEVICE — PREP CHLORAPREP 26ML TINTED ORANGE  260815

## (undated) DEVICE — PREP SKIN SCRUB TRAY 4461A

## (undated) DEVICE — DRSG KERLIX 4 1/2"X4YDS ROLL 6715

## (undated) DEVICE — GLOVE PROTEXIS W/NEU-THERA 8.0  2D73TE80

## (undated) DEVICE — BONE CEMENT MIXEVAC III HI VAC KIT  0206-015-000

## (undated) DEVICE — GLOVE PROTEXIS W/NEU-THERA 6.5  2D73TE65

## (undated) DEVICE — GLOVE PROTEXIS POWDER FREE 8.0 ORTHOPEDIC 2D73ET80

## (undated) DEVICE — SU NDL CUT REV MED 3/8 209014

## (undated) DEVICE — CONNECTOR BLAKE DRAIN SGL BCC1

## (undated) DEVICE — DRSG ADAPTIC 3X8" 6113

## (undated) DEVICE — LINEN TOWEL PACK X5 5464

## (undated) DEVICE — ESU GROUND PAD UNIVERSAL W/O CORD

## (undated) DEVICE — SOL WATER IRRIG 1000ML BOTTLE 2F7114

## (undated) DEVICE — GLOVE PROTEXIS W/NEU-THERA 7.5  2D73TE75

## (undated) DEVICE — HOOD FLYTE W/PEELAWAY 408-800-100

## (undated) DEVICE — SU VICRYL 1 CTX CR 8X18" J765D

## (undated) DEVICE — BLADE KNIFE SURG 10 371110

## (undated) DEVICE — SYR BULB IRRIG 50ML LATEX FREE 0035280

## (undated) DEVICE — SU PROLENE 4-0 RB-1DA 36" 8557H

## (undated) DEVICE — SU VICRYL 0 CP-1 27" J467H

## (undated) DEVICE — BLADE SAW SAGITTAL STRK 18X90X1.27MM HD SYS 6 6118-127-090

## (undated) DEVICE — PACK MINOR SBA15MIFSE

## (undated) DEVICE — ESU ELEC BLADE 6" COATED/INSULATED E1455-6

## (undated) DEVICE — DRAIN ROUND W/RESERV KIT JACKSON PRATT 10FR 400ML SU130-402D

## (undated) DEVICE — CATH ON-Q PAIN SILVER SKR 2.5" PM010-A

## (undated) DEVICE — SU SILK 2 REEL 60" SA8H

## (undated) DEVICE — SOL NACL 0.9% IRRIG 1000ML BOTTLE 07138-09

## (undated) DEVICE — TUBING SUCTION MEDI-VAC SOFT 3/16"X20' N520A

## (undated) DEVICE — MANIFOLD NEPTUNE 4 PORT 700-20

## (undated) DEVICE — DRAPE BREAST/CHEST 29420

## (undated) DEVICE — BONE CLEANING TIP INTERPULSE  0210-010-000

## (undated) DEVICE — DRAPE POUCH INSTRUMENT 1018

## (undated) DEVICE — STPL SKIN 35W 6.9MM  PXW35

## (undated) DEVICE — TOURNIQUET SGL  BLADDER 30"X4" BLUE 5921030135

## (undated) DEVICE — PACK TOTAL KNEE SOP15TKFSD

## (undated) DEVICE — BLADE KNIFE SURG 15 371115

## (undated) DEVICE — SU PROLENE 3-0 V-7DA 36" 8976H

## (undated) DEVICE — SU VICRYL 2-0 CP-1 27" UND J266H

## (undated) DEVICE — GOWN IMPERVIOUS ZONED LG

## (undated) DEVICE — DRAIN PENROSE 0.75"X18" LATEX FREE GR205

## (undated) DEVICE — NDL 22GA 1.5"

## (undated) DEVICE — SU ETHIBOND 0 CTX CR  8X18" CX31D

## (undated) DEVICE — STPL ENDO ARTICULATING 60MM EC60A

## (undated) DEVICE — STPL RELOAD REG/THK TISSUE ECHELON 60 X 3.8MM GOLD GST60D

## (undated) DEVICE — SU VICRYL 1 CT 36" J959H

## (undated) DEVICE — TAPE DRSG UNIVERSAL CLOTH 3" WHITE LATEX 881-3

## (undated) DEVICE — DRSG KERLIX FLUFFS X5

## (undated) DEVICE — DRSG GAUZE 4X4" 3033

## (undated) DEVICE — SU VICRYL 0 CT-1 36" J346H

## (undated) DEVICE — PREP CHLORAPREP 26ML TINTED HI-LITE ORANGE 930815

## (undated) DEVICE — SU PDO 1 STRATAFIX 36X36CM CTX TAPERPOINT SXPD2B405

## (undated) DEVICE — SUCTION IRR SYSTEM W/O TIP INTERPULSE HANDPIECE 0210-100-000

## (undated) DEVICE — SU SILK 2-0 TIE 24" SA75H

## (undated) DEVICE — SUCTION CANISTER MEDIVAC LINER 3000ML W/LID 65651-530

## (undated) DEVICE — SYR 10ML FINGER CONTROL W/O NDL 309695

## (undated) DEVICE — SU VICRYL 2-0 CT-1 27" J339H

## (undated) RX ORDER — FENTANYL CITRATE 50 UG/ML
INJECTION, SOLUTION INTRAMUSCULAR; INTRAVENOUS
Status: DISPENSED
Start: 2020-10-24

## (undated) RX ORDER — FENTANYL CITRATE 50 UG/ML
INJECTION, SOLUTION INTRAMUSCULAR; INTRAVENOUS
Status: DISPENSED
Start: 2019-03-26

## (undated) RX ORDER — ONDANSETRON 2 MG/ML
INJECTION INTRAMUSCULAR; INTRAVENOUS
Status: DISPENSED
Start: 2019-03-26

## (undated) RX ORDER — VANCOMYCIN HYDROCHLORIDE 1 G/20ML
INJECTION, POWDER, LYOPHILIZED, FOR SOLUTION INTRAVENOUS
Status: DISPENSED
Start: 2022-07-25

## (undated) RX ORDER — LIDOCAINE HYDROCHLORIDE 20 MG/ML
INJECTION, SOLUTION EPIDURAL; INFILTRATION; INTRACAUDAL; PERINEURAL
Status: DISPENSED
Start: 2019-03-26

## (undated) RX ORDER — FENTANYL CITRATE 50 UG/ML
INJECTION, SOLUTION INTRAMUSCULAR; INTRAVENOUS
Status: DISPENSED
Start: 2022-04-11

## (undated) RX ORDER — NEOSTIGMINE METHYLSULFATE 1 MG/ML
VIAL (ML) INJECTION
Status: DISPENSED
Start: 2019-03-26

## (undated) RX ORDER — ONDANSETRON 2 MG/ML
INJECTION INTRAMUSCULAR; INTRAVENOUS
Status: DISPENSED
Start: 2022-07-25

## (undated) RX ORDER — DEXAMETHASONE SODIUM PHOSPHATE 4 MG/ML
INJECTION, SOLUTION INTRA-ARTICULAR; INTRALESIONAL; INTRAMUSCULAR; INTRAVENOUS; SOFT TISSUE
Status: DISPENSED
Start: 2022-07-25

## (undated) RX ORDER — FENTANYL CITRATE 50 UG/ML
INJECTION, SOLUTION INTRAMUSCULAR; INTRAVENOUS
Status: DISPENSED
Start: 2022-04-12

## (undated) RX ORDER — CLINDAMYCIN PHOSPHATE 900 MG/50ML
INJECTION, SOLUTION INTRAVENOUS
Status: DISPENSED
Start: 2019-03-26

## (undated) RX ORDER — PROPOFOL 10 MG/ML
INJECTION, EMULSION INTRAVENOUS
Status: DISPENSED
Start: 2022-07-25

## (undated) RX ORDER — DEXAMETHASONE SODIUM PHOSPHATE 4 MG/ML
INJECTION, SOLUTION INTRA-ARTICULAR; INTRALESIONAL; INTRAMUSCULAR; INTRAVENOUS; SOFT TISSUE
Status: DISPENSED
Start: 2019-03-26

## (undated) RX ORDER — GLYCOPYRROLATE 0.2 MG/ML
INJECTION, SOLUTION INTRAMUSCULAR; INTRAVENOUS
Status: DISPENSED
Start: 2019-03-26

## (undated) RX ORDER — BUPIVACAINE HYDROCHLORIDE 5 MG/ML
INJECTION, SOLUTION EPIDURAL; INTRACAUDAL
Status: DISPENSED
Start: 2022-05-09

## (undated) RX ORDER — BUPIVACAINE HYDROCHLORIDE 5 MG/ML
INJECTION, SOLUTION EPIDURAL; INTRACAUDAL
Status: DISPENSED
Start: 2019-03-26

## (undated) RX ORDER — FENTANYL CITRATE 50 UG/ML
INJECTION, SOLUTION INTRAMUSCULAR; INTRAVENOUS
Status: DISPENSED
Start: 2022-08-26

## (undated) RX ORDER — FENTANYL CITRATE 50 UG/ML
INJECTION, SOLUTION INTRAMUSCULAR; INTRAVENOUS
Status: DISPENSED
Start: 2020-10-25

## (undated) RX ORDER — HYDROMORPHONE HYDROCHLORIDE 1 MG/ML
INJECTION, SOLUTION INTRAMUSCULAR; INTRAVENOUS; SUBCUTANEOUS
Status: DISPENSED
Start: 2019-03-26

## (undated) RX ORDER — HEPARIN SODIUM 1000 [USP'U]/ML
INJECTION, SOLUTION INTRAVENOUS; SUBCUTANEOUS
Status: DISPENSED
Start: 2022-07-25

## (undated) RX ORDER — PROPOFOL 10 MG/ML
INJECTION, EMULSION INTRAVENOUS
Status: DISPENSED
Start: 2019-03-26

## (undated) RX ORDER — KETOROLAC TROMETHAMINE 15 MG/ML
INJECTION, SOLUTION INTRAMUSCULAR; INTRAVENOUS
Status: DISPENSED
Start: 2019-03-26

## (undated) RX ORDER — LIDOCAINE HYDROCHLORIDE 20 MG/ML
INJECTION, SOLUTION EPIDURAL; INFILTRATION; INTRACAUDAL; PERINEURAL
Status: DISPENSED
Start: 2022-07-25

## (undated) RX ORDER — ALBUTEROL SULFATE 0.83 MG/ML
SOLUTION RESPIRATORY (INHALATION)
Status: DISPENSED
Start: 2020-08-17